# Patient Record
Sex: FEMALE | Race: WHITE | NOT HISPANIC OR LATINO | Employment: UNEMPLOYED | URBAN - METROPOLITAN AREA
[De-identification: names, ages, dates, MRNs, and addresses within clinical notes are randomized per-mention and may not be internally consistent; named-entity substitution may affect disease eponyms.]

---

## 2017-01-02 ENCOUNTER — TELEPHONE (OUTPATIENT)
Dept: PALLIATIVE MEDICINE | Facility: CLINIC | Age: 52
End: 2017-01-02

## 2017-01-02 DIAGNOSIS — M54.16 LUMBAR RADICULOPATHY: ICD-10-CM

## 2017-01-02 DIAGNOSIS — G35 MS (MULTIPLE SCLEROSIS) (H): ICD-10-CM

## 2017-01-02 DIAGNOSIS — M62.838 MUSCLE SPASM: Primary | ICD-10-CM

## 2017-01-02 NOTE — TELEPHONE ENCOUNTER
"Called patient. Patient states that things have been really rough with back and sciatic pain. She also reports an increase in gluteal pain. Reports that  the \"MS has tighten things up\".  She reports further difficulty with ADL's including getting dressed, getting into bed ect.  States that her pain is so high that she is not sleeping and has no appetite. She reports that when she has spasms her pain level is amplified. States she \"has been miserable\".  She reports that she has been wearing  Her TENS unit almost 24/7, says that she may get some relief but is unsufre because her pain level is already so high it is \"hard to know if it is a 7 or 9 lately\".  Also is using ice packs and doing PT stretches as taught, working on hip and hip flexors and stretching when she is laying in bed. She states that she has been \"trying everything she can\" and that her \"whole day spent on trying not to hurt.   Reports that with Methadone increase she has experienced no difference in pain level. Klonopin has also had no impact on pain.  Denied SE's with either of these med changes. She notes that she is to get baclofen pump at end of Feb.   Scheduled for follow up on 01/11/17.  Routing to provider to review and provide any suggestion in the mean time. Patient made aware provider currently out of office    Per 12/10/16 OV:  PROCEDURES:    Opioid management will continue with [facility]      Future appointment/ Follow up :  With Saba Worley CNP  1 months, patient to contact me in one week to see how she is doing    Neurology,  pt to make contact follow up MS  Discussed social work for resources, and home care referral with patient will route request to PCP    Baclofen pump evaluation Erick Ramos    Medications  Increase Methadone 5 - 5- 7.5 mg and on 12/15/16 7.5- 5- 7.5mg   Keep Oxycodone 10 mg without changes for now    Add Klonopin 0.5 mg take 0.25 mg at hs for spasm control, discussed risk associated " with concurrent use of opioids     Other:  Narcan nasal spray rationale, use explained, patient    Methadone use, rationale.  Education    Time spent: 30  minutes including  10 minutes interval history, assessment and medication review/adjustments. 20 minutes for counseling, treatment options, and coordinating care for the above identified medical problems. 10 minutes for medication education on methadone and Narcan     Signed: Saba Worley RN, BC, C.A.N.P.  [facility]    Heidi SANTOS-RN Care Coordinator  [facility]

## 2017-01-02 NOTE — TELEPHONE ENCOUNTER
1/2 @ [facility]    Patient LM wanting to talk with Saba about trying something different. She's been miserable and wants to talk about other options. [phone]    Shabana Flores    [facility]

## 2017-01-03 NOTE — TELEPHONE ENCOUNTER
I reviewed this note and I am needing to clarify what dose of methadone she is on. The last TO she did not increase the Methadone and was taking 5 mg tid.   I am considering a change in her opioids and need to also confirm the amount of Oxycodone she is taking.  Is the Klonopin still at 0.25 mg at hs.  Initially she stated it was helping by 30 %. Is this still true?   Saba Worley, CNP    [facility]

## 2017-01-03 NOTE — TELEPHONE ENCOUNTER
Called patient. Lm to call triage with information on her doses and also good time frame to reach her at to discuss.     Heidi Beltran  BSN-RN Care Coordinator  [facility]              Saba Worley APRN CNP at 12/22/2016  1:26 PM      Status: Signed         Expand All Collapse All    Based on this information I am advising the patient to 1.increase the Methadone to 5-7.5 mg 5 mg today . Then in one week (12/29) increase to 5mg 7.5 mg- 7.5 mg. Her current supply should last almost 3 days. I have approved the RX of Methadone to start 12/25/16.    No change in Klonopin or Oxycodone at this time.    She also need to follow up with her neurologist that manages her MS.    Saba Worley CNP     [facility]

## 2017-01-04 RX ORDER — CLONAZEPAM 0.5 MG/1
0.25 TABLET ORAL 2 TIMES DAILY
Qty: 30 TABLET | Refills: 0 | Status: SHIPPED | OUTPATIENT
Start: 2017-01-04 | End: 2017-01-19

## 2017-01-04 NOTE — TELEPHONE ENCOUNTER
LDM 1/3 at 15:22.    Patient is returning call. She states she is taking 1.5 tabs of methadone TID, 1 tab oxycodone every 6-8 hours, and 1 tab klonopin at bedtime. Call back number: [phone]    Stacy Ying    [facility]

## 2017-01-04 NOTE — TELEPHONE ENCOUNTER
Routing to provider - please see Amanda's reported dosing structure below for Methadone, Oxycodone, and Klonopin.    Haven Coreas, MSN, RN-BC  Care Coordinator  [facility] [place]

## 2017-01-04 NOTE — TELEPHONE ENCOUNTER
I discussed case with Heidi Mueller DO. Contacted patient to inform.  1. Increase Oxycodone 10 mg to 5 doses per day and frequency to 4 hours prn   2. Increase Klonopin to .25 mg bid ( 1/2 tablet(s))  3. Begin taper of Methadone by 2.5 mg weekly.  4. Patient instructed to:  1. Contact her treating neurologist for MS to explore other treatment options.  2. To contact Dr. Griffin in regards to lower dose of Botox for spasm control.  5. I will contact PCP per in-basket message to order Home care with Physical Therapy.  6. Script written for Klonopin 0.25 mg bid  7. Patient  has enough Oxycodone with increase to 5/day,current RX should last until 1/11/17 ( has visit this date.  Please contact patient to inform that supply of current Oxycodone will last until her visit date with me.  Rx for Klonopin has been sent to pharmacy ( located in lock box).  Confirm that increase of Klonopin is 0.25 mg ( 1/2 tablet(s)) bid.     Saba Worley, CNP    [facility]

## 2017-01-04 NOTE — TELEPHONE ENCOUNTER
"Spoke with Amanda - states \"I really don't feel any difference\" on new 7.5mg TID dosing of Methadone. Initially reported up to a 30% improvement but that is no longer the case, no continued improvement. States at times, pain has returned to same level as it was prior to starting Methadone. Days \"I can't move\". Reports most of day living at pain level of 7-8, many other times up to 9-10, but never better than a 7 unless sitting completely still, in a position she manages to find that does not increase pain.    Haven Coreas, MSN, RN-BC  Care Coordinator  [facility]    "

## 2017-01-05 NOTE — TELEPHONE ENCOUNTER
I faxed patients prescription to the [facility] in [place]. I also called and left a message that this was done and that she had enough medication until her next appointment.      Lottie Nielsen [facility] [place]

## 2017-01-13 ENCOUNTER — TELEPHONE (OUTPATIENT)
Dept: PALLIATIVE MEDICINE | Facility: CLINIC | Age: 52
End: 2017-01-13

## 2017-01-13 NOTE — TELEPHONE ENCOUNTER
Verbal discussion with Saba Walls regarding this refill. Patient has cancelled her last 3 appointments, per Saba, she must have follow up appointment next week to receive medications.    I called Amanda and informed her of this - appointment scheduled for 1/19/17. Also let Amanda know she had a new RX for the Klonopin faxed to pharmacy 1/5/17 - she was not aware. Confirmed with her the last RX for Oxycodone was for a start date of 12/23/16 so she should have plenty to get through to her appointment next week.    Haven Coreas, MSN, RN-BC  Care Coordinator  [facility]

## 2017-01-13 NOTE — TELEPHONE ENCOUNTER
Patient called at 10:57. She would like a refill of her oxycodone and also her clonopin. Both of these can be left at the  in [place] for her  to . We can reach her at [phone]    Shannon MCMAHAN (R)

## 2017-01-16 DIAGNOSIS — G35 MS (MULTIPLE SCLEROSIS) (H): ICD-10-CM

## 2017-01-16 DIAGNOSIS — M54.16 LUMBAR RADICULOPATHY: ICD-10-CM

## 2017-01-16 DIAGNOSIS — M62.838 MUSCLE SPASM: Primary | ICD-10-CM

## 2017-01-16 DIAGNOSIS — G89.4 CHRONIC PAIN SYNDROME: ICD-10-CM

## 2017-01-16 NOTE — TELEPHONE ENCOUNTER
Reason for call: Medication Question    Will the patient be using a [facility]? No  Name of the pharmacy and phone number for the current request: [id number] - [place], [place] - [address]    Name of the medication requested: Oxycodone (Roxicodone) 10 mg IR tablet    Fernando (patient's ) requests to speak with medical staff. States patient has been without pain med and is hurting. Patient and spouse are wondering why the medication wasn't prescribed. Requests to be called back ASAP.    Phone Number Fernando (pt's ) can be reached at: Cell number on file:    Telephone Information:   Mobile [phone]     Best Time: ASAP  Can we leave a detailed message on this number? YES    Kim Maunabo    [facility]

## 2017-01-17 ENCOUNTER — TELEPHONE (OUTPATIENT)
Dept: PALLIATIVE MEDICINE | Facility: CLINIC | Age: 52
End: 2017-01-17

## 2017-01-17 RX ORDER — OXYCODONE HYDROCHLORIDE 10 MG/1
TABLET ORAL
Qty: 12 TABLET | Refills: 0 | Status: SHIPPED | OUTPATIENT
Start: 2017-01-17 | End: 2017-01-19

## 2017-01-17 NOTE — TELEPHONE ENCOUNTER
Called patient. She states that her script was only for #100 Oxycodone, this would not equate to 4/day max for 30days. Advised that script was written as 3-4/day and thus 100 is the mid point of quantity needed, this is what caused confusion in thinking she had enough to last until appointment. She states that she consistently uses 4/day max not 3. She reports that it is possible that she may have used more than 4/day somedays by mistake but generally only takes 4/day.     Confirmed Oxycodone- usage is 4 per day, is completely out. Did not take anything this morning. Last taken 3 pm.   Confirmed Klonopin- Taking 1/2 tablet BID, but was told by on call to take 1/2 tab q4h, she started this last evening. Has approx. #24 left.     Advised that Provider likely will provide script for her Oxycodone with enough quantity to get her to her appointment which is 01/19/17 at 11:00. She reports that her spouse Fernando would be available to  script today.  Advised that our office would contact her for script .     Oxycodone 10mg tablets, #12, Refill:NO  Take 1 to 2 tablet(s) every 4 hours as needed for pain limit 3-4  tablet(s) per day.  This is a 3 day supply.   Ok to dispense on/after 01/17/17 and Start 01/17/17      Pt last seen on 12/09/16  Next appt scheduled for 01/19/17    Last urine drug screen 06/09/2016  Current opioid agreement on file Yes Date: 06/09/16    Processing (pick one):       Pt will  in clinic at  location- [facility] will . Call when ready.       Will facilitate refill.            .

## 2017-01-17 NOTE — TELEPHONE ENCOUNTER
Medication approved. Pleas leave call open for provider  Saba Worley CNP    [facility]

## 2017-01-17 NOTE — TELEPHONE ENCOUNTER
Copied documentation from another TE:    Call Documentation      Saba Worley APRN CNP at 1/17/2017  1:49 PM      Status: Signed         Expand All Collapse All    Reviewed note, appreciate cross coverage. I did authorize the Oxycodone RX ( on the call noted) I have also left instructions for the nurse informthe patient stay on increase Klonopin 0.25 mg every 4 hrs as needed with limit 4 per day. Please see the telephone encounter date 1/16/17. If this is not effective she should contact our office.     Saba Worley CNP     [facility]                      Heidi Mueller DO at 1/17/2017  9:08 AM      Status: Signed         Expand All Collapse All    Patient's  (Fernando) called on call last night. He said his wife was out of pain medication and was expected to be out last week. He said they had called and left messages on Friday and Monday. I do see a telephone encounter from her usual pain provider BAYLEE Powers CNP stating that her oxycodone Rx should last until 1/11/17. She took her last methadone 5 mg in the morning and her last dose of oxycodone was on Sunday. When asked to speak with the patient, her  said she could not come to the phone because of the intensity of her pain. He said he was looking for some answers for pain control until they are able to get the baclofen pump trial, which he says is not scheduled until next month. She has been taking clonazepam 0.5 mg 1/2 tab twice a day. I said it was ok to take 1/2 tab every 4-6 hours as needed until able to  the opioid script in the morning.     Heidi Mueller DO  [facility]

## 2017-01-17 NOTE — TELEPHONE ENCOUNTER
Per ARTHUR Beltran, patient has already been informed of the message below.    Haven Coreas, MSN, RN-BC  Care Coordinator  [facility] [place]

## 2017-01-17 NOTE — TELEPHONE ENCOUNTER
Fernando (spouse) calling, he would like to know if the script is ready to be picked up. He states he was told that it would be ready between 9-10 am, I advised him that the note states he should get a call when this is ready to be picked up. Please call      Carmen OSBORNE    [facility]

## 2017-01-17 NOTE — TELEPHONE ENCOUNTER
Patient's  (Fernando) called on call last night. He said his wife was out of pain medication and was expected to be out last week. He said they had called and left messages on Friday and Monday. I do see a telephone encounter from her usual pain provider BAYLEE Powers CNP stating that her oxycodone Rx should last until 1/11/17. She took her last methadone 5 mg in the morning and her last dose of oxycodone was on Sunday. When asked to speak with the patient, her  said she could not come to the phone because of the intensity of her pain. He said he was looking for some answers for pain control until they are able to get the baclofen pump trial, which he says is not scheduled until next month. She has been taking clonazepam 0.5 mg 1/2 tab twice a day. I said it was ok to take 1/2 tab every 4-6 hours as needed until able to  the opioid script in the morning.     Heidi Mueller DO  [facility]

## 2017-01-17 NOTE — TELEPHONE ENCOUNTER
Rx placed at  [facility]. Left patient message. , Fernando, authorized to     Haven Washburn, [facility]

## 2017-01-17 NOTE — TELEPHONE ENCOUNTER
Chart reviewed. Note: I did not state that script would be ready at any specified time but stated that we would route for review and contact when ready.

## 2017-01-17 NOTE — TELEPHONE ENCOUNTER
Reviewed note, appreciate cross coverage. I did authorize the Oxycodone RX ( on the call noted) I have also left instructions for the nurse informthe patient stay on increase Klonopin 0.25 mg every 4 hrs as needed with limit 4 per day. Please see the telephone encounter date 1/16/17. If this is not effective she should contact our office.    Saba Worley, CNP    [facility]

## 2017-01-19 ENCOUNTER — OFFICE VISIT (OUTPATIENT)
Dept: PALLIATIVE MEDICINE | Facility: CLINIC | Age: 52
End: 2017-01-19
Payer: COMMERCIAL

## 2017-01-19 ENCOUNTER — TELEPHONE (OUTPATIENT)
Dept: PALLIATIVE MEDICINE | Facility: CLINIC | Age: 52
End: 2017-01-19

## 2017-01-19 VITALS
BODY MASS INDEX: 35.43 KG/M2 | SYSTOLIC BLOOD PRESSURE: 132 MMHG | WEIGHT: 240 LBS | DIASTOLIC BLOOD PRESSURE: 79 MMHG | HEART RATE: 88 BPM | OXYGEN SATURATION: 91 %

## 2017-01-19 DIAGNOSIS — G89.4 CHRONIC PAIN SYNDROME: Primary | ICD-10-CM

## 2017-01-19 DIAGNOSIS — G35 MS (MULTIPLE SCLEROSIS) (H): ICD-10-CM

## 2017-01-19 DIAGNOSIS — M54.16 LUMBAR RADICULOPATHY: ICD-10-CM

## 2017-01-19 DIAGNOSIS — M62.838 MUSCLE SPASM: ICD-10-CM

## 2017-01-19 PROCEDURE — 99214 OFFICE O/P EST MOD 30 MIN: CPT | Performed by: NURSE PRACTITIONER

## 2017-01-19 RX ORDER — CLONAZEPAM 0.5 MG/1
0.25 TABLET ORAL
Qty: 90 TABLET | Refills: 0 | Status: ON HOLD | OUTPATIENT
Start: 2017-01-19 | End: 2017-01-21

## 2017-01-19 RX ORDER — OXYCODONE HYDROCHLORIDE 10 MG/1
TABLET ORAL
Qty: 210 TABLET | Refills: 0 | Status: SHIPPED | OUTPATIENT
Start: 2017-01-19 | End: 2017-02-16

## 2017-01-19 RX ORDER — OXYCODONE HYDROCHLORIDE 10 MG/1
TABLET ORAL
Qty: 210 TABLET | Refills: 0 | Status: SHIPPED | OUTPATIENT
Start: 2017-01-19 | End: 2017-01-19

## 2017-01-19 ASSESSMENT — PAIN SCALES - GENERAL: PAINLEVEL: EXTREME PAIN (8)

## 2017-01-19 NOTE — PROGRESS NOTES
"Chief Complaint   Patient presents with     Pain     follow up       Initial LMP 12/11/2011 Estimated body mass index is 36.61 kg/(m^2) as calculated from the following:    Height as of 10/30/16: 1.753 m (5' 9\").    Weight as of 10/30/16: 112.492 kg (248 lb).  BP completed using cuff size: sapphire Nielsen [facility]        "

## 2017-01-19 NOTE — PROGRESS NOTES
"  Interval history :This a follow up examination  for Amanda Richardson who is an 49 year old female, who is  being seen in follow up for chronic pain.   Primary Care Provider:Julius Hassan MD.   in visit today  Last visit 11/02/16  Today's date: 01/19/2017      Chief complaints    Leg spasms are worse, baclofen pump trial 2/28 \" if baclofen not effective can we just go to the Morphine pump\" (spouse stated)    short acting Oxycodone 10-20  mg not effective would like higher dose taking 4-5 tablet(s) per day now Klonopin help a little, nothing helps more than 3 hrs.     Back with right leg pain, spasm of Gluteal muscle     Had Botox in 12/4 no help at [facility] [address]  No longer receiving, now right leg vangie and his not weight bearing      Opioid analgesia high, see no effect with Methadone stopped         Risk Son with CD history and is doing well with Sobriety.  He is not working      medical marijuana not effective cost was high, not using, concerned with THC entry in chart. Patient showed me bottle from dispensary and noted THC on bottle.        Current Pain:   Description: sharp, tender,shooting, spasms off and on all day long   Location: low back, right hip right gluteal, right leg,   Pain level: 8/10,  Average 8/10, range 8-10/10  Quality  Worse at evening-night, continuous, exhausting, miserable, nagging, unbearable     Aggravating factors: inactivity, over doing   Relieving factors  Rest, medications, ice, stretching, Botox( 08/2015),TENS, meds,ice lidoderm   Quality of life: mood depressed,.  Denies suicidal thoughts,but frustrated.   Activity level not much walking any more, struggles with self care. Sleep still poor, sleeps periods of 2 hours at a time waking with painful spasms with a total 4-6 hours per night or less, naps during the day.     Progress in Program:   Counseling prn, Physical Therapy: complete,  Self care daily TENS, theracane, ice,  exercise yes,walking relaxation yes, body awareness " no.    PAST MEDICATION TRIALS:  Hydrocodone,Fentanyl, OxyContin, Lea, Tramadol, Ibuprofen, Naproxen, Flexeril, Tizanidine, Cymbalta, Lyrica, Fentanyl. 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     Fusion lumbar anterior, fusion lumbar posterior two levels, combined  10/17/2013     lumbar fusion - Dr Floyd     Surgical history of -   3/14     Left L4-5 Epidural Dr Winter     Colonoscopy N/A 1/7/2015     tubular adenomas x 2 - due 5 yrs     Surgical history of -   5/15     Right Bimalleolar ankle fx ORIF     Current Medications:   Current Outpatient Prescriptions   Medication     oxyCODONE (ROXICODONE) 10 MG IR tablet     clonazePAM (KLONOPIN) 0.5 MG tablet     naloxone (NARCAN) nasal spray     DULoxetine (CYMBALTA) 30 MG capsule     amitriptyline (ELAVIL) 100 MG tablet     losartan-hydrochlorothiazide (HYZAAR) 100-25 MG  per tablet     metoprolol (TOPROL-XL) 50 MG 24 hr tablet     gabapentin (NEURONTIN) 300 MG capsule     lidocaine (LIDODERM) 5 % patch     glatiramer (COPAXONE) 20 MG/ML injection     Cholecalciferol (VITAMIN D PO)     Multiple Vitamin (MULTI-VITAMIN PO)     omega-3 fatty acids (FISH OIL) 1200 MG capsule     aspirin 81 MG tablet     [DISCONTINUED] clonazePAM (KLONOPIN) 0.5 MG tablet     No current facility-administered medications for this visit.     ROS: twelve systems review negative except for: fatigue, joint pain,insomnia, urinary urgency, weakness,stiffness, spasm, stiffness. numbness/tingling, and depression     Physical Exam  Constitutional:Blood pressure 132/79, pulse 88, weight 108.863 kg (240 lb), last menstrual period 12/11/2011, SpO2 91 %, not currently breastfeeding.  Psyche:  Fully oriented, Behavioral Observations: Eye contact good. Mood and spirits are  Depressed, tearful    Working status no   Musculoskeletal exam: standing with support of exam table but mainly in w/c today. Less  frequent spasms of right leg noted today at visit  Neuro exam:   Alert,  RONI @ 3 mm, Speech clear fluent and appropriate.  TURCIOS x 4, noting frequent spasms ( painful spasms) RLE  Skin/Vascular/ Autonomic:  warm, dry and intact    Opioid risk:   DIRE Score for ongoing opioid management is calculated as follows:    Diagnosis = 3    Intractability = 2    Risk: Psych = 2  Chem Hlth = 2  Reliability = 2  Social = 2    Efficacy = 2    Total DIRE Score = 15 (14 or higher predicts good candidate for ongoing opioid management; 13 or lower predicts poor candidate for opioid management)   Daniel Corado 2006,The Journal of  Pain.,The DIRE Score: Predicting Outcomes of Opioid Prescribing for Chronic Pain Vol.7,No.9, pp 841-804.  MNPMP:  reviewed as expected without evidence of abuse, misuse or diversion.  Opioid tolerance moderate to high Morphine EQ 75 mg /day ( in the setting of benzodiazapine use at 30 mg Valium per  day)  Compliance: UDS 06/09/16 expected results, opioid agreement 06/09/16  Analgesia  very poor  Activity:little to none, Adverse events: none  Adherence: fair     Assessment:  1 Lumbar radiculopathy - right hip right leg  and low back  right hip bursitis piriformis syndrome in past    A. Chronic low back pain with bilateral radicular feature L2-3 on right and L3-4 on left. Reports increase low back radicular right leg pain.  Frequent spasms contributing     - Flexion based pain s/p interventional procedure left L2-3 epidural  controlled     - unlikely there is epidural access problematic due to extent of fusion                -History of lumbar multi level lumbar a/p fusion L4-S1 with pelvic fixation Oct 2013 (procedure below)     Anterior spinal fusion, L4-L5. Insertion of intervertebral biomechanical device, L4-L5 (PEEK device). Anterior lumbar instrumentation, L4-L5, with screw fixation and anterior cover plate. [place] of local autograft bone fro5%m bony endplates.Posterior lumbar fusion, L4-L5 (bilateral intertransverse technique) Posterolateral fusion, L5-S1 (bilateral intertransverse to sacral ala technique). Bilateral decompression, L4-L5.L5-S1. Segmental pedicle screw instrumentation, L4-S1. Bilateral pelvic screw instrumentation with connection to [id number] screws.    Plan:   Continue Cymbalta 90 mg daily,continue Gabapentin 300 mg qid, Amitriptyline 100 mg,Lidoderm patches. Theracare, ice packs. Consider JEFFREY if possible at L5-S1 or caudal JEFFREY.  May need to redo MRI   2.  Multiple sclerosis-  Spasms ? Relationship to opioids/ Gabapentin  and metabolic Myoclonus    -  Follow up with Dr. Vigil Immobility and isolation problematic. Painful spasms that can keep her wake all night long and now prolong during day.      Plan:  Severe Spasticity noted right leg and right gluteal area. Continue Oxycodone, baclofen  Pump, Klonopin 0.25 mg (6) doses per day Valium EQ is 30 mg per day  No further dose increases over  the phone or in clinic.      Patient has Resource given in past for national MS society at last visit  www.nationalmssociety.org/Resources- consider  service and see if MS resources can be available  for this          3. Gait and balance disorder  fall with subsequent injury post ORIF X2 right ankle   Monitor medication effects to reduce falls risk     Plan:    4wheeled walker and cane. Discussed safety has handicap hang tag, now essentially w/chair bound      4. Mood disorder: depression symptoms ( isolation , increase pain and spasms, relationship stress)    Plan : cymbalta 90 mg     Mental health referral placed in past     [place] www.al-[email or web address] if needed    5.   Insomnia Non restorative sleep with frequent awakening due to pain and non pain sources, self report of snoring leg jerks onset 11 pm    Plan:  Amitriptyline 100 mg     6. Encounter for continuous use of opioids:    24 opioid total per day 50  mg per day Oxycodone , Morphine  EQ 75 mg per day   Concerns with over use  Self medicates   Narcan for home use        .       PROCEDURES:  None   Opioid management will continue with [facility]     Future appointment/ Follow up :  With Saba Worley CNP  1 months, patient to contact me   Neurology follow up with Dr. Vigil Scheduled  Discussed social work for resources, and home care referral with patient has PCP appointment upcoming    Baclofen pump trial 2/28/17 Dr. Ramos    Medications  Oxycodone 10 mg Increase to 1-2 tablet(s) every 4 hours limit 7 per day ( patient states at times taking 5 tablet(s) per day. No further adjustments in clinic advised in patient management for pain control  Add Klonopin 0.5 mg take 0.25 mg every 4 hrs prn  for spasm control, limit 6 per day discussed risk associated with concurrent use of opioids     Other:  Narcan nasal spray rationale, use explained, patient     Time spent: 30  minutes including  10 minutes interval history, assessment and  medication review/adjustments. 10 minutes for counseling, treatment options, and coordinating care for the above identified medical problems. 10 minutes for medication education Narcan and unintended AE's with opioids and Benzodiazepines     Signed: Saba Worley RN, BC, C.A.N.P.  [facility]

## 2017-01-19 NOTE — TELEPHONE ENCOUNTER
Pharmacy calling - the last RX was listed as a 30-day supply for 12/23/16 so insurance will not release RX given at appointment today until 1/20/17. Reviewed med notes on last RX which states on 1/4/17 Saba Walls authorized patient to increase up to 5/day, that is why she's out.     Pharmacist Samy is going to call Amanda's insurance with above information and request an override. I called Amanda to update her on the talk with pharmacy. I asked her about the extra 3-day prescription she was given on 1/17/17, if she had gotten that filled. She said yes, at a different pharmacy, [facility] she thought. She stated she has 4-6 of these left, so is not out in case override requested by pharmacy does not go through.    Haven Coreas, MSN, RN-BC  Care Coordinator  [facility]

## 2017-01-19 NOTE — PATIENT INSTRUCTIONS
Increase Oxycodone 10 mg to 1-2 tablet(s) every 4 hours limit 7 per day   Increase interval dosing of Klonopin 0.5 mg to 1/2 tablet(s) to every 3 hours as needed.  Limit  6 1/2 tablet(s) per day.     Anticipate baclofen pump trial  Advised to talk with  to see if an appointment cn be coordinated with a provider that does morphine pump.  Maybe the same cannula can be used for this trial if the baclofen trial is not effective.     Contact me if the increase dosing does not work.  Return visit in 3-4 weeks.     Saba Worley CNP    [facility]       Nurse Triage line:  [phone]   Call this number with any questions or concerns. You may leave a detailed message anytime. Calls are typically returned Monday through Friday between 8 AM and 4:30 PM. We usually get back to you within 2 business days depending on the issue/request.       Medication refills:    For non-narcotic medications, call your pharmacy directly to request a refill. The pharmacy will contact the Pain Management Center for authorization. Please allow 3-4 days for these refills to be processed.     For narcotic refills, call the nurse triage line or send a [employer] message. Please contact us 7-10 days before your refill is due. The message MUST include the name of the specific medication(s) requested and how you would like to receive the prescription(s). The options are as follows:    Pain Clinic staff can mail the prescription to your pharmacy. Please tell us the name of the pharmacy.    You may pick the prescription up at the Pain Clinic (tell us the location) or during a clinic visit with your pain provider    Pain Clinic staff can deliver the prescription to the [facility] pharmacy in the clinic building. Please tell us the location.      Scheduling number: [phone].  Call this number to schedule or change appointments.    We believe regular attendance is key to your success in our program.    Any time you are  unable to keep your appointment we ask that you call us at least 24 hours in advance to let us know. This will allow us to offer the appointment time to another patient.

## 2017-01-19 NOTE — MR AVS SNAPSHOT
After Visit Summary   1/19/2017    Amanda Richardson    MRN: [id number]           Patient Information     Date Of Birth          1965        Visit Information        Provider Department      1/19/2017 11:00 AM Saba Worley APRN CNP [facility]        Today's Diagnoses     Chronic pain syndrome    -  1     MS (multiple sclerosis)         Muscle spasm         MS (multiple sclerosis) (H)         Lumbar radiculopathy           Care Instructions    Increase Oxycodone 10 mg to 1-2 tablet(s) every 4 hours limit 7 per day   Increase interval dosing of Klonopin 0.5 mg to 1/2 tablet(s) to every 3 hours as needed.  Limit  6 1/2 tablet(s) per day.     Anticipate baclofen pump trial  Advised to talk with  to see if an appointment cn be coordinated with a provider that does morphine pump.  Maybe the same cannula can be used for this trial if the baclofen trial is not effective.     Contact me if the increase dosing does not work.  Return visit in 3-4 weeks.     Saba Worley CNP    [facility]       Nurse Triage line:  [phone]   Call this number with any questions or concerns. You may leave a detailed message anytime. Calls are typically returned Monday through Friday between 8 AM and 4:30 PM. We usually get back to you within 2 business days depending on the issue/request.       Medication refills:    For non-narcotic medications, call your pharmacy directly to request a refill. The pharmacy will contact the [facility] Center for authorization. Please allow 3-4 days for these refills to be processed.     For narcotic refills, call the nurse triage line or send a TSB message. Please contact us 7-10 days before your refill is due. The message MUST include the name of the specific medication(s) requested and how you would like to receive the prescription(s). The options are as follows:    Pain Clinic staff can mail the  prescription to your pharmacy. Please tell us the name of the pharmacy.    You may pick the prescription up at the Pain Clinic (tell us the location) or during a clinic visit with your pain provider    Pain Clinic staff can deliver the prescription to the [facility] pharmacy in the clinic building. Please tell us the location.      Scheduling number: [phone].  Call this number to schedule or change appointments.    We believe regular attendance is key to your success in our program.    Any time you are unable to keep your appointment we ask that you call us at least 24 hours in advance to let us know. This will allow us to offer the appointment time to another patient.             Follow-ups after your visit        Who to contact     If you have questions or need follow up information about today's clinic visit or your schedule please contact [facility] directly at [phone].  Normal or non-critical lab and imaging results will be communicated to you by [employer], letter or phone within 4 business days after the clinic has received the results. If you do not hear from us within 7 days, please contact the clinic through [employer] or phone. If you have a critical or abnormal lab result, we will notify you by phone as soon as possible.  Submit refill requests through [employer] or call your pharmacy and they will forward the refill request to us. Please allow 3 business days for your refill to be completed.          Additional Information About Your Visit        [employer] Information     [employer] gives you secure access to your electronic health record. If you see a primary care provider, you can also send messages to your care team and make appointments. If you have questions, please call your primary care clinic.  If you do not have a primary care provider, please call [phone] and they will assist you.        Care EveryWhere ID     This is your Care EveryWhere ID. This could be used by other  organizations to access your [facility] medical records  [id number]        Your Vitals Were     Pulse Pulse Oximetry Last Period             88 91% 12/11/2011          Blood Pressure from Last 3 Encounters:   01/19/17 132/79   12/09/16 128/60   11/02/16 130/70    Weight from Last 3 Encounters:   01/19/17 108.863 kg (240 lb)   10/30/16 112.492 kg (248 lb)   05/02/16 112.492 kg (248 lb)              Today, you had the following     No orders found for display         Today's Medication Changes          These changes are accurate as of: 1/19/17 11:48 AM.  If you have any questions, ask your nurse or doctor.               These medicines have changed or have updated prescriptions.        Dose/Directions    clonazePAM 0.5 MG tablet   Commonly known as:  klonoPIN   This may have changed:    - when to take this  - reasons to take this   Used for:  Muscle spasm, MS (multiple sclerosis) (H), Lumbar radiculopathy        Dose:  0.25 mg   Take 0.5 tablets (0.25 mg) by mouth every 3 hours as needed for anxiety (limit 6 1/2 tabs per day)   Quantity:  90 tablet   Refills:  0       oxyCODONE 10 MG IR tablet   Commonly known as:  ROXICODONE   This may have changed:  additional instructions   Used for:  Chronic pain syndrome, MS (multiple sclerosis) (H)        Take 1 to 2 tablet(s) every 4 hours as needed for pain limit 7  tablet(s) per day.   Quantity:  210 tablet   Refills:  0            Where to get your medicines      Some of these will need a paper prescription and others can be bought over the counter.  Ask your nurse if you have questions.     Bring a paper prescription for each of these medications    - clonazePAM 0.5 MG tablet  - oxyCODONE 10 MG IR tablet             Primary Care Provider Office Phone # Fax #    Julius Hassan [phone]       [address] [address]        Goals        Patient-Stated    I will continue to work with home care until discharge goals  are met.     Goal Comments - Note edited  10/28/2015  3:09 PM by Cordelia Moreland RN    As of today's date 9/22/2015 goal is met at 76 - 100%.   Goal Status:  Ongoing  As of today's date 10/28/2015 goal is met at 76 - 100%.   Goal Status:  Complete            Thank you!     Thank you for choosing [facility]  for your care. Our goal is always to provide you with excellent care. Hearing back from our patients is one way we can continue to improve our services. Please take a few minutes to complete the written survey that you may receive in the mail after your visit with us. Thank you!             Your Updated Medication List - Protect others around you: Learn how to safely use, store and throw away your medicines at www.disposemymeds.org.          This list is accurate as of: 1/19/17 11:48 AM.  Always use your most recent med list.                   Brand Name Dispense Instructions for use    amitriptyline 100 MG tablet    ELAVIL    90 tablet    Take 1 tablet (100 mg) by mouth At Bedtime       aspirin 81 MG tablet     100 tablet    Take 1 tablet by mouth daily.       clonazePAM 0.5 MG tablet    klonoPIN    90 tablet    Take 0.5 tablets (0.25 mg) by mouth every 3 hours as needed for anxiety (limit 6 1/2 tabs per day)       DULoxetine 30 MG EC capsule    CYMBALTA    270 capsule    30 mg in am and 60 mg in pm       gabapentin 300 MG capsule    NEURONTIN    120 capsule    Take 1 capsule (300 mg) by mouth 4 times daily       glatiramer 20 MG/ML injection    COPAXONE    30 each    Inject 1 mL (20 mg) Subcutaneous daily       lidocaine 5 % Patch    LIDODERM    30 patch    Apply 1  patches to low back and right buttock ( cut to fit)  area at once for up to 12 h within a 24 h period.  Remove after 12 hours.       losartan-hydrochlorothiazide 100-25 MG per tablet    HYZAAR    90 tablet    Take 1 tablet by mouth daily       metoprolol 50 MG 24 hr tablet    TOPROL-XL    90 tablet    Take 1 tablet (50 mg) by  mouth daily       MULTI-VITAMIN PO      Take 1 tablet by mouth daily       naloxone nasal spray    NARCAN    0.2 mL    Spray 1 spray (4 mg) into one nostril alternating nostrils as needed for opioid reversal One spray into nares prn for opioid reversal (use every 2-3 minutes until medical help arrives)       omega-3 fatty acids 1200 MG capsule      Take 1 capsule by mouth daily.       oxyCODONE 10 MG IR tablet    ROXICODONE    210 tablet    Take 1 to 2 tablet(s) every 4 hours as needed for pain limit 7  tablet(s) per day.       VITAMIN D PO      Take 1 tablet by mouth daily 1000 IU daily

## 2017-01-19 NOTE — TELEPHONE ENCOUNTER
Pharmacist calling at 2:06 pm today to say that The Oxycodone dose has doubled and won't go through Pt's insurance. He would like  A call at  [phone].       Fernando calling to say the same thing.  Shellie cates rn

## 2017-01-19 NOTE — TELEPHONE ENCOUNTER
The patient's spouse tried to fill this RX at [facility] in [place]  [phone].   Patient did not fill the Rx date 1/17 start date until yesterday 1/18/17 and this was for 12 tabs.  Her opioid prescriptions are being filled at [facility] and [facility]  She is not filling them in a timely manner and using more than one pharmacies. These are concerning issues contributes to poor pain control and has significant safety risk.   I spoke with the lead pharmacist at [facility].   Patient has supply at home to get her through the night, Pharmacist informed, no over ride needed, patient is not to pay cash for Oxycodone.   She will need to wait until it can be filled by on 1/20/17.  Patient called and informed by this writer.  Left message on cell phone to contact our office for update.   I have written for the last Oxycodone 10 mg at a limit for  Limit of 7 per day.  Please advise ( was told in office) that this is a significant increase. She is taking 5 per day per her report. So for today and tomorrow limit to 6 per day and then if tolerating can increase to 7 per day.  She did not make a follow up appointment with me. I will not authorize any new refills of opioids or any increases over the phone. She MUST be seen by this writer.  Her pain is so difficult to control and there is strong concern for unintended respiratory  suppression.  If further adjustment are needed, the patient may need to be admitted for pain control.   Routing to on call pain provider and discussed with him by phone.  Saba Worley, CNP    [facility]

## 2017-01-20 ENCOUNTER — HOSPITAL ENCOUNTER (INPATIENT)
Facility: CLINIC | Age: 52
LOS: 3 days | Discharge: HOME | DRG: 059 | End: 2017-01-25
Attending: NURSE PRACTITIONER | Admitting: INTERNAL MEDICINE
Payer: MEDICARE

## 2017-01-20 ENCOUNTER — TELEPHONE (OUTPATIENT)
Dept: NURSING | Facility: CLINIC | Age: 52
End: 2017-01-20

## 2017-01-20 DIAGNOSIS — M62.838 MUSCLE SPASMS OF BOTH LOWER EXTREMITIES: Primary | ICD-10-CM

## 2017-01-20 DIAGNOSIS — M79.605 CHRONIC PAIN OF BOTH LOWER EXTREMITIES: ICD-10-CM

## 2017-01-20 DIAGNOSIS — M79.604 CHRONIC PAIN OF BOTH LOWER EXTREMITIES: ICD-10-CM

## 2017-01-20 DIAGNOSIS — G89.29 CHRONIC PAIN OF BOTH LOWER EXTREMITIES: ICD-10-CM

## 2017-01-20 PROCEDURE — 99285 EMERGENCY DEPT VISIT HI MDM: CPT

## 2017-01-20 RX ORDER — DIAZEPAM 5 MG
5 TABLET ORAL ONCE
Status: COMPLETED | OUTPATIENT
Start: 2017-01-20 | End: 2017-01-21

## 2017-01-20 RX ORDER — OXYCODONE HYDROCHLORIDE 5 MG/1
10 TABLET ORAL ONCE
Status: COMPLETED | OUTPATIENT
Start: 2017-01-20 | End: 2017-01-21

## 2017-01-20 ASSESSMENT — ENCOUNTER SYMPTOMS
BACK PAIN: 1
NUMBNESS: 0
ARTHRALGIAS: 1

## 2017-01-20 NOTE — IP AVS SNAPSHOT
MRN:[id number]                      After Visit Summary   1/20/2017    Amanda Richardson    MRN: [id number]           Thank you!     Thank you for choosing [facility] for your care. Our goal is always to provide you with excellent care. Hearing back from our patients is one way we can continue to improve our services. Please take a few minutes to complete the written survey that you may receive in the mail after you visit. If you would like to speak to someone directly about your visit please contact Patient Relations at [phone]. Thank you!          Patient Information     Date Of Birth          1965        About your hospital stay     You were admitted on:  January 21, 2017 You last received care in the:  [address] [facility]    You were discharged on:  January 25, 2017        Reason for your hospital stay       Muscle spasm  UTI                  Who to Call     For medical emergencies, please call 911.  For non-urgent questions about your medical care, please call your primary care provider or clinic, [phone]          Attending Provider     Provider    Dewayne Mcdaniel, Rg Sinclair CNP, Miguel Ángel Alvarez MD       Primary Care Provider Office Phone # Fax #    Julius Hassan [phone]       [place] [address]        After Care Instructions     Diet       Follow this diet upon discharge: Regular                  Follow-up Appointments     Follow-up and recommended labs and tests        Follow up with the pain specialist in 1-2 weeks for hospital follow- up.    Recommend having a repeat urinalysis as outpatient once UTI is adequately treated, this can be done as outpatient in 2 weeks with your Primary care doctor. If you continue to have blood cells in urine,  then discuss with PCP and consider referral to urology                  Further instructions from your care  team       Opioid Medication Information    You have been given a prescription for an opioid (narcotic) pain medicine and/or have received a pain medicine. These medicines can make you drowsy or impaired. You must not drive, operate dangerous equipment, or engage in any other dangerous activities while taking these medications. If you drive while taking these medications, you could be arrested for DUI, or driving under the influence. Do not drink any alcohol while you are taking these medications.   Opioid pain medications can cause addiction. If you have a history of chemical dependency of any type, you are at a higher risk of becoming addicted to pain medications.  Only take these prescribed medications to treat your pain when all other options have been tried. Take it for as short a time and as few doses as possible. Store your pain pills in a secure place, as they are frequently stolen and provide a dangerous opportunity for children or visitors in your house to start abusing these powerful medications. We will not replace any lost or stolen medicine.  As soon as your pain is better, you should seek out a drug take back program (see your local police department) to dispose of them.   Over-the-counter medications and prescription drugs can pollute mosley and be harmful to humans, fish, and other wildlife when disposed of improperly -- do not flush medications down the toilet or place in the trash.  Properly disposing of medicines is important to prevent abuse or poisoning and protect the environment.     Prescription and over-the-counter medications are collected anonymously from residents for free at [facility] drop-off locations. Visit the [facility]'s Office Prescription Drug Drop-Off page for the list of drop-off sites and information about the program.       [place]  Police Department [phone] Mon-Fri  8am to 4:30pm     [place]  Police Department [phone] 24hrs a  "day    [place]  Police Department [phone] Mon-Fri  8am to 6:00pm     [place]  Police Department [phone] Mon-Fri  8am to 4:30pm     [place]  Police Department [phone] 24hrs a day      [place]  Police Department [phone] Mon-Fri  8am to 4:30pm   Many prescription pain medications contain Tylenol  (acetaminophen), including Vicodin , Tylenol #3 , Norco , Lortab , and Percocet .  You should not take any extra pills of Tylenol  if you are using these prescription medications or you can get very sick.  Do not ever take more than 3000 mg of acetaminophen in any 24 hour period.  All opioids tend to cause constipation. Drink plenty of water and eat foods that have a lot of fiber, such as fruits, vegetables, prune juice, apple juice and high fiber cereal.  Take a laxative if you don t move your bowels at least every other day. Miralax , Milk of Magnesia, Colace , or Senna  can be used to keep you regular.  You will likely need to continue stool softeners and stimulants while taking opioids.     Stop taking Clonazepam. Destroy filled prescription at home.    Maximum dose of oxycodone 10mg tabs is 5 per day.         Pending Results     No orders found from 1/19/2017 to 1/21/2017.            Statement of Approval     Ordered          01/25/17 1338  I have reviewed and agree with all the recommendations and orders detailed in this document.   EFFECTIVE NOW     Approved and electronically signed by:  Lyric Brandon MD             Admission Information        Provider Department Dept Phone    1/20/2017 Miguel Ángel Banegas MD, MD  [facility] [phone]      Your Vitals Were     Blood Pressure Pulse Temperature Respirations    120/42 mmHg 83 97.8  F (36.6  C) (Oral) 16    Height Weight BMI (Body Mass Index) Pulse Oximetry    1.753 m (5' 9\") 110.224 kg (243 lb) 35.87 kg/m2 93%    Last Period             12/11/2011         MyChart Information     [facility] gives you secure " access to your electronic health record. If you see a primary care provider, you can also send messages to your care team and make appointments. If you have questions, please call your primary care clinic.  If you do not have a primary care provider, please call [phone] and they will assist you.        Care EveryWhere ID     This is your Care EveryWhere ID. This could be used by other organizations to access your [facility] medical records  [id number]           Review of your medicines      START taking        Dose / Directions    ATIVAN 0.5 MG tablet   Used for:  Muscle spasms of both lower extremities   Generic drug:  LORazepam        Three times per day. Take one dose at bedtime. May have 2 other doses every 6 hours if needed for muscle spasms. Max of 3 tabs per day. Do not take if sleepy.   Quantity:  42 tablet   Refills:  0       baclofen 10 MG tablet   Commonly known as:  LIORESAL   Used for:  Chronic pain of both lower extremities        Dose:  10 mg   Take 1 tablet (10 mg) by mouth 3 times daily   Quantity:  90 tablet   Refills:  0       ciprofloxacin 250 MG tablet   Commonly known as:  CIPRO   Indication:  Urinary Tract Infection   Used for:  Chronic pain of both lower extremities        Dose:  250 mg   Take 1 tablet (250 mg) by mouth 2 times daily for 7 doses   Quantity:  7 tablet   Refills:  0         CONTINUE these medicines which may have CHANGED, or have new prescriptions. If we are uncertain of the size of tablets/capsules you have at home, strength may be listed as something that might have changed.        Dose / Directions    gabapentin 600 MG tablet   Commonly known as:  NEURONTIN   This may have changed:    - medication strength  - how much to take  - when to take this   Used for:  Chronic pain of both lower extremities        Dose:  600 mg   Take 1 tablet (600 mg) by mouth 3 times daily   Quantity:  90 tablet   Refills:  0         CONTINUE these medicines which have NOT CHANGED        Dose /  Directions    amitriptyline 100 MG tablet   Commonly known as:  ELAVIL   Used for:  Lumbar radiculopathy, Chronic pain syndrome, MS (multiple sclerosis) (H), Moderate depressive episode (H)        Dose:  100 mg   Take 1 tablet (100 mg) by mouth At Bedtime   Quantity:  90 tablet   Refills:  3       ASPIRIN PO        Dose:  162 mg   Take 162 mg by mouth daily   Refills:  0       * DULOXETINE HCL PO        Dose:  30 mg   Take 30 mg by mouth every morning   Refills:  0       * DULOXETINE HCL PO        Dose:  60 mg   Take 60 mg by mouth every evening   Refills:  0       glatiramer 20 MG/ML injection   Commonly known as:  COPAXONE   Used for:  MS (multiple sclerosis) (H)        Dose:  20 mg   Inject 1 mL (20 mg) Subcutaneous daily   Quantity:  30 each   Refills:  0       lidocaine 5 % Patch   Commonly known as:  LIDODERM   Used for:  Lumbar radiculopathy, Muscle spasms of both lower extremities        Apply 1  patches to low back and right buttock ( cut to fit)  area at once for up to 12 h within a 24 h period.  Remove after 12 hours.   Quantity:  30 patch   Refills:  6       losartan-hydrochlorothiazide 100-25 MG per tablet   Commonly known as:  HYZAAR   Used for:  Hypertension goal BP (blood pressure) < 140/90        Dose:  1 tablet   Take 1 tablet by mouth daily   Quantity:  90 tablet   Refills:  3       metoprolol 50 MG 24 hr tablet   Commonly known as:  TOPROL-XL   Used for:  Hypertension goal BP (blood pressure) < 140/90        Dose:  50 mg   Take 1 tablet (50 mg) by mouth daily   Quantity:  90 tablet   Refills:  3       MULTI-VITAMIN PO        Dose:  1 tablet   Take 1 tablet by mouth daily   Refills:  0       omega-3 fatty acids 1200 MG capsule        Dose:  1 capsule   Take 1 capsule by mouth daily.   Refills:  0       oxyCODONE 10 MG IR tablet   Commonly known as:  ROXICODONE   Used for:  Chronic pain syndrome, MS (multiple sclerosis) (H)        Take 1 to 2 tablet(s) every 4 hours as needed for pain limit 7   tablet(s) per day. 30 day supply . Start 1/19/17, dispense on or after 1/19/17   Quantity:  210 tablet   Refills:  0       VITAMIN D PO        Dose:  1 tablet   Take 1 tablet by mouth daily 1000 IU daily   Refills:  0       * Notice:  This list has 2 medication(s) that are the same as other medications prescribed for you. Read the directions carefully, and ask your doctor or other care provider to review them with you.      STOP taking     CLONAZEPAM PO                Where to get your medicines      These medications were sent to [place], [place] - [address] [facility]  [address] [facility] [address]     Phone:  [phone]    - baclofen 10 MG tablet  - ciprofloxacin 250 MG tablet  - gabapentin 600 MG tablet      Some of these will need a paper prescription and others can be bought over the counter. Ask your nurse if you have questions.     Bring a paper prescription for each of these medications    - ATIVAN 0.5 MG tablet             Protect others around you: Learn how to safely use, store and throw away your medicines at www.disposemymeds.org.             Medication List: This is a list of all your medications and when to take them. Check marks below indicate your daily home schedule. Keep this list as a reference.      Medications           Morning Afternoon Evening Bedtime As Needed    amitriptyline 100 MG tablet   Commonly known as:  ELAVIL   Take 1 tablet (100 mg) by mouth At Bedtime   Last time this was given:  100 mg on 1/24/2017 10:20 PM                                ASPIRIN PO   Take 162 mg by mouth daily   Last time this was given:  81 mg on 1/25/2017  8:38 AM                                ATIVAN 0.5 MG tablet   Three times per day. Take one dose at bedtime. May have 2 other doses every 6 hours if needed for muscle spasms. Max of 3 tabs per day. Do not take if sleepy.   Last time this was given:  1 mg on 1/25/2017  4:36 PM   Generic drug:  LORazepam                                 baclofen 10 MG tablet   Commonly known as:  LIORESAL   Take 1 tablet (10 mg) by mouth 3 times daily   Last time this was given:  10 mg on 1/25/2017  4:36 PM                                ciprofloxacin 250 MG tablet   Commonly known as:  CIPRO   Take 1 tablet (250 mg) by mouth 2 times daily for 7 doses   Last time this was given:  250 mg on 1/25/2017  8:39 AM                                * DULOXETINE HCL PO   Take 30 mg by mouth every morning   Last time this was given:  30 mg on 1/25/2017  8:38 AM                                * DULOXETINE HCL PO   Take 60 mg by mouth every evening   Last time this was given:  30 mg on 1/25/2017  8:38 AM                                gabapentin 600 MG tablet   Commonly known as:  NEURONTIN   Take 1 tablet (600 mg) by mouth 3 times daily                                glatiramer 20 MG/ML injection   Commonly known as:  COPAXONE   Inject 1 mL (20 mg) Subcutaneous daily                                lidocaine 5 % Patch   Commonly known as:  LIDODERM   Apply 1  patches to low back and right buttock ( cut to fit)  area at once for up to 12 h within a 24 h period.  Remove after 12 hours.   Last time this was given:  1 patch on 1/25/2017  8:43 AM                                losartan-hydrochlorothiazide 100-25 MG per tablet   Commonly known as:  HYZAAR   Take 1 tablet by mouth daily                                metoprolol 50 MG 24 hr tablet   Commonly known as:  TOPROL-XL   Take 1 tablet (50 mg) by mouth daily   Last time this was given:  50 mg on 1/25/2017  8:39 AM                                MULTI-VITAMIN PO   Take 1 tablet by mouth daily                                omega-3 fatty acids 1200 MG capsule   Take 1 capsule by mouth daily.                                oxyCODONE 10 MG IR tablet   Commonly known as:  ROXICODONE   Take 1 to 2 tablet(s) every 4 hours as needed for pain limit 7  tablet(s) per day. 30 day supply . Start 1/19/17, dispense on or  after 1/19/17   Last time this was given:  10 mg on 1/25/2017  2:42 PM                                VITAMIN D PO   Take 1 tablet by mouth daily 1000 IU daily                                * Notice:  This list has 2 medication(s) that are the same as other medications prescribed for you. Read the directions carefully, and ask your doctor or other care provider to review them with you.

## 2017-01-20 NOTE — IP AVS SNAPSHOT
[place] [address] [facility]    [address]    [facility] [address]    Phone:  [phone]    Fax:  [phone]                                       After Visit Summary   1/20/2017    Amanda Richardson    MRN: [id number]           After Visit Summary Signature Page     I have received my discharge instructions, and my questions have been answered. I have discussed any challenges I see with this plan with the nurse or doctor.    ..........................................................................................................................................  Patient/Patient Representative Signature      ..........................................................................................................................................  Patient Representative Print Name and Relationship to Patient    ..................................................               ................................................  Date                                            Time    ..........................................................................................................................................  Reviewed by Signature/Title    ...................................................              ..............................................  Date                                                            Time

## 2017-01-20 NOTE — IP AVS SNAPSHOT
` `     [place] [id number] MEDICAL SURGICAL: [phone]            Medication Administration Report for Amnada Richardson as of 01/24/17 1952   Legend:    Given Hold Not Given Due Canceled Entry Other Actions    Time Time (Time) Time  Time-Action       Inactive    Active    Linked        Medications 01/18/17 01/19/17 01/20/17 01/21/17 01/22/17 01/23/17 01/24/17    acetaminophen (TYLENOL) tablet 650 mg  Dose: 650 mg Freq: EVERY 4 HOURS PRN Route: PO  PRN Reason: mild pain  Start: 01/21/17 0319   Admin Instructions: Alternate ibuprofen (if ordered) with acetaminophen.  Maximum acetaminophen dose from all sources = 75 mg/kg/day not to exceed 4 grams/day.        0808 (650 mg)-Given        1814 (650 mg)-Given        2353 (650 mg)-Given            amitriptyline (ELAVIL) tablet 100 mg  Dose: 100 mg Freq: AT BEDTIME Route: PO  Start: 01/21/17 0330       0359 (100 mg)-Given       2207 (100 mg)-Given        2148 (100 mg)-Given        2208 (100 mg)-Given        [ ] 2200           aspirin EC EC tablet 81 mg  Dose: 81 mg Freq: DAILY Route: PO  Start: 01/21/17 0900       0808 (81 mg)-Given        0842 (81 mg)-Given        0823 (81 mg)-Given        0930 (81 mg)-Given           baclofen (LIORESAL) half-tab 5 mg  Dose: 5 mg Freq: 3 TIMES DAILY PRN Route: PO  PRN Reason: muscle spasms  Start: 01/22/17 0033        0118 (5 mg)-Given       1840 (5 mg)-Given        0247 (5 mg)-Given       1329 (5 mg)-Given       2026 (5 mg)-Given        0013 (5 mg)-Given [C]       1230 (5 mg)-Given           baclofen (LIORESAL) tablet 10 mg  Dose: 10 mg Freq: 3 TIMES DAILY Route: PO  Start: 01/23/17 2200         2209 (10 mg)-Given        0931 (10 mg)-Given       1624 (10 mg)-Given       [ ] 2200           bisacodyl (DULCOLAX) Suppository 10 mg  Dose: 10 mg Freq: DAILY PRN Route: RE  PRN Reason: constipation  Start: 01/21/17 0319   Admin Instructions: Hold for loose stools.  This is the third step of a three step constipation treatment protocol.                ciprofloxacin (CIPRO) tablet 250 mg  Dose: 250 mg Freq: EVERY 12 HOURS SCHEDULED Route: PO  Indications of Use: URINARY TRACT INFECTION  Start: 01/22/17 0800   Admin Instructions: Administer at least 2 hours before or 4 hours after aluminum, calcium, iron, zinc or magnesium containing medications. May be taken with food or on an empty stomach.  Do not administer alone with a dairy product like milk or yogurt or calcium-fortified juice,  but may be administered with a meal containing dairy.         0845 (250 mg)-Given       2019 (250 mg)-Given        0823 (250 mg)-Given       2022 (250 mg)-Given        0931 (250 mg)-Given       [ ] 2000           DULoxetine (CYMBALTA) EC capsule 30 mg  Dose: 30 mg Freq: EVERY MORNING Route: PO  Start: 01/22/17 0900        0843 (30 mg)-Given        0822 (30 mg)-Given        0932 (30 mg)-Given           DULoxetine (CYMBALTA) EC capsule 60 mg  Dose: 60 mg Freq: EVERY EVENING Route: PO  Start: 01/21/17 2000       2030 (60 mg)-Given        2019 (60 mg)-Given        2022 (60 mg)-Given        [ ] 2000           enoxaparin (LOVENOX) injection 40 mg  Dose: 40 mg Freq: EVERY 24 HOURS Route: SC  Start: 01/22/17 1330        1416 (40 mg)-Given        1333 (40 mg)-Given        1450 (40 mg)-Given           gabapentin (NEURONTIN) capsule 600 mg  Dose: 600 mg Freq: 3 TIMES DAILY Route: PO  Start: 01/23/17 2200         2208 (600 mg)-Given        0929 (600 mg)-Given       1623 (600 mg)-Given       [ ] 2200           hydrALAZINE (APRESOLINE) injection 10 mg  Dose: 10 mg Freq: EVERY 6 HOURS PRN Route: IV  PRN Reason: high blood pressure  PRN Comment: for SBP > 160 or   Start: 01/22/17 0741              ibuprofen (ADVIL/MOTRIN) tablet 600 mg  Dose: 600 mg Freq: EVERY 6 HOURS PRN Route: PO  PRN Reason: moderate pain  Start: 01/21/17 0623       0650 (600 mg)-Given        0712 (600 mg)-Given [C]       1613 (600 mg)-Given        0605 (600 mg)-Given        1130 (600 mg)-Given       1802 (600  mg)-Given           lidocaine (LIDODERM) 5 % Patch 1 patch  Dose: 1 patch Freq: EVERY 24 HOURS 0800 Route: TD  Start: 01/21/17 0345   Admin Instructions: Apply patch(s) to affected area. To prevent lidocaine toxicity, patient should be patch free for 12 hrs daily. Patches may be cut to smaller size prior to removing release liner.  NEVER APPLY HEAT OVER PATCH which will increase absorption and may lead to risk of local anesthetic toxicity.  Do not apply over area where liposomal bupivacaine was injected for 96 hours post injection.        0352 (1 patch)-Given       0810 (1 patch)-Patch Removed [C]       0909 (1 patch)-Given [C]        0846 (1 patch)-Given        0821 (1 patch)-Given        0953 (1 patch)-Given [C]           lidocaine (LIDODERM) Patch in Place  Freq: EVERY 8 HOURS Route: TD  Start: 01/21/17 0345   Admin Instructions: Chart every shift, confirming that patch is still in place on patient (no barcode scan needed). See patch order for dose information.  NEVER APPLY HEAT OVER PATCH which will increase absorption and may lead to risk of local anesthetic toxicity. Do not apply over area where liposomal bupivacaine injected for 96 hours.        0354 ( )-Patch in Place       1216 ( )-Patch in Place       2036 ( )-Patch Removed        0253 ( )-Negative       1207 ( )-Patch in Place       2026 ( )-Patch Removed        0251 ( )-Negative       1109 ( )-Patch in Place       1954 ( )-Patch in Place        0252 ( )-Negative       1223 ( )-Patch in Place       [ ] 1945           lidocaine (LIDODERM) patch REMOVAL  Freq: EVERY 24 HOURS 2000 Route: TD  Start: 01/21/17 2000   Admin Instructions: Remove lidocaine Patch.        2036 ( )-Patch Removed        2026 ( )-Patch Removed        2022 ( )-Patch Removed        [ ] 2000           LORazepam (ATIVAN) tablet 0.5-1 mg  Dose: 0.5-1 mg Freq: EVERY 6 HOURS PRN Route: PO  PRN Reasons: anxiety,muscle spasms  Start: 01/23/17 1314   Admin Instructions: Offer oral route if  possible          1330 (1 mg)-Given        0932 (1 mg)-Given       1623 (1 mg)-Given          Or  LORazepam (ATIVAN) injection 0.5-1 mg  Dose: 0.5-1 mg Freq: EVERY 6 HOURS PRN Route: IV  PRN Reasons: anxiety,muscle spasms  Start: 01/23/17 1314   Admin Instructions: Offer oral route if possible  For IV PUSH: Dilute with equal volume of NS.                                    losartan-hydrochlorothiazide (HYZAAR) 50-12.5 MG per tablet 2 tablet  Dose: 2 tablet Freq: DAILY Route: PO  Start: 01/21/17 0900       1303 (2 tablet)-Given        0843 (2 tablet)-Given        0822 (2 tablet)-Given        0929 (2 tablet)-Given           metoprolol (TOPROL-XL) 24 hr tablet 50 mg  Dose: 50 mg Freq: DAILY Route: PO  Start: 01/21/17 0900   Admin Instructions: DO NOT CRUSH. Tablet may be split in half along score line.        1303 (50 mg)-Given        0842 (50 mg)-Given        0823 (50 mg)-Given        0930 (50 mg)-Given           miconazole (MICATIN; MICRO GUARD) 2 % powder  Freq: EVERY 1 HOUR PRN Route: Top  PRN Reason: other  PRN Comment: topical candidiasis  Start: 01/21/17 2201   Admin Instructions: Apply to affected area.               naloxone (NARCAN) injection 0.1-0.4 mg  Dose: 0.1-0.4 mg Freq: EVERY 2 MIN PRN Route: IV  PRN Reason: opioid reversal  Start: 01/21/17 0318   Admin Instructions: For respiratory rate LESS than or EQUAL to 8.  Partial reversal dose:  0.1 mg titrated q 2 minutes for Analgesia Side Effects Monitoring Sedation Level of 3 (frequently drowsy, arousable, drifts to sleep during conversation).Full reversal dose:  0.4 mg bolus for Analgesia Side Effects Monitoring Sedation Level of 4 (somnolent, minimal or no response to stimulation).               nicotine (NICOTROL) Inhaler 1 cartridge  Dose: 1 cartridge Freq: EVERY 1 HOUR PRN Route: IN  PRN Reason: smoking cessation  Start: 01/21/17 0326   Admin Instructions: Each cartridge delivers 4 mg.               ondansetron (ZOFRAN-ODT) ODT tab 4 mg  Dose: 4 mg  Freq: EVERY 6 HOURS PRN Route: PO  PRN Reason: nausea  Start: 01/21/17 0319   Admin Instructions: This is Step 1 of nausea and vomiting management.  If nausea not resolved in 15 minutes, go to Step 2 prochlorperazine (COMPAZINE). Do not push through foil backing. Peel back foil and gently remove. Place on tongue immediately. Administration with liquid unnecessary              Or  ondansetron (ZOFRAN) injection 4 mg  Dose: 4 mg Freq: EVERY 6 HOURS PRN Route: IV  PRN Reasons: nausea,vomiting  Start: 01/21/17 0319   Admin Instructions: This is Step 1 of nausea and vomiting management.  If nausea not resolved in 15 minutes, go to Step 2 prochlorperazine (COMPAZINE).               oxyCODONE (ROXICODONE) IR tablet 10 mg  Dose: 10 mg Freq: EVERY 4 HOURS PRN Route: PO  PRN Reason: moderate to severe pain  Start: 01/23/17 0845   Admin Instructions: If age greater than 65 use lower dose for first time use.          1502 (10 mg)-Given       1910 (10 mg)-Given       2353 (10 mg)-Given        1130 (10 mg)-Given       1802 (10 mg)-Given           polyethylene glycol (MIRALAX/GLYCOLAX) Packet 17 g  Dose: 17 g Freq: DAILY PRN Route: PO  PRN Reason: constipation  Start: 01/21/17 0319   Admin Instructions: Give in 8oz of  water, juice, or soda. Hold for loose stools.  This is the second step of a three step constipation treatment protocol.  1 Packet = 17 grams. Mixed prescribed dose in 8 ounces of water. Follow with 8 oz. of water.               prochlorperazine (COMPAZINE) injection 5-10 mg  Dose: 5-10 mg Freq: EVERY 6 HOURS PRN Route: IV  PRN Reasons: nausea,vomiting  Start: 01/21/17 0319   Admin Instructions: This is Step 2 of nausea and vomiting management.   If nausea not resolved in 15 minutes, give metoclopramide (REGLAN) if ordered (step 3 of nausea and vomiting management)              Or  prochlorperazine (COMPAZINE) tablet 5-10 mg  Dose: 5-10 mg Freq: EVERY 6 HOURS PRN Route: PO  PRN Reason: vomiting  Start: 01/21/17 0319    Admin Instructions: This is Step 2 of nausea and vomiting management.   If nausea not resolved in 15 minutes, give metoclopramide (REGLANI) if ordered (step 3 of nausea and vomiting management)              Or  prochlorperazine (COMPAZINE) Suppository 25 mg  Dose: 25 mg Freq: EVERY 12 HOURS PRN Route: RE  PRN Reasons: nausea,vomiting  Start: 01/21/17 0319   Admin Instructions: This is Step 2 of nausea and vomiting management.   If nausea not resolved in 15 minutes, give metoclopramide (REGLAN) if ordered (step 3 of nausea and vomiting management)               senna-docusate (SENOKOT-S;PERICOLACE) 8.6-50 MG per tablet 1-2 tablet  Dose: 1-2 tablet Freq: 2 TIMES DAILY PRN Route: PO  PRN Comment: constipation   Start: 01/21/17 0319   Admin Instructions: If no bowel movement in 24 hours, increase to 2 tablets PO BID.  Hold for loose stools.   This is the first step of a three step constipation treatment protocol.          1330 (1 tablet)-Given        0954 (2 tablet)-Given          Future Medications  Medications 01/18/17 01/19/17 01/20/17 01/21/17 01/22/17 01/23/17 01/24/17       LORazepam (ATIVAN) tablet 0.5 mg  Dose: 0.5 mg Freq: AT BEDTIME Route: PO  Start: 01/24/17 2200   Admin Instructions: See also prn dosing of lorazepam for muscle spasms. Hold for sedation.           [ ] 2200          Completed Medications  Medications 01/18/17 01/19/17 01/20/17 01/21/17 01/22/17 01/23/17 01/24/17         Dose: 125 mg Freq: DAILY Route: IV  Start: 01/22/17 1345   End: 01/24/17 0954   Admin Instructions: Doses greater than 125 mg need to be in at least 50 mL IVPB         1459 (125 mg)-Given        0946 (125 mg)-Given        0954 (125 mg)-Given          Discontinued Medications  Medications 01/18/17 01/19/17 01/20/17 01/21/17 01/22/17 01/23/17 01/24/17         Rate: 100 mL/hr Freq: CONTINUOUS Route: IV  Last Dose: Stopped (01/21/17 2243)  Start: 01/21/17 0330   End: 01/22/17 1315       0400 ( )-New Bag       2243-Stopped         1318-Med Discontinued           Dose: 5 mg Freq: 3 TIMES DAILY Route: PO  Start: 01/21/17 1130   End: 01/23/17 1754       1253 (5 mg)-Given       1817 (5 mg)-Given       2208 (5 mg)-Given        0843 (5 mg)-Given       1614 (5 mg)-Given       2149 (5 mg)-Given        0824 (5 mg)-Given       1550 (5 mg)-Given       1754-Med Discontinued          Rate: 100 mL/hr Freq: CONTINUOUS Route: IV  Start: 01/22/17 1330   End: 01/22/17 1713        1336 ( )-New Bag       1713-Med Discontinued           Dose: 300 mg Freq: 4 TIMES DAILY Route: PO  Start: 01/21/17 0900   End: 01/23/17 1754       0808 (300 mg)-Given       1254 (300 mg)-Given       1740 (300 mg)-Given       2206 (300 mg)-Given        0842 (300 mg)-Given       1219 (300 mg)-Given       1803 (300 mg)-Given       2148 (300 mg)-Given        0823 (300 mg)-Given       1333 (300 mg)-Given       1754-Med Discontinued          Dose: 0.3-0.5 mg Freq: EVERY 2 HOURS PRN Route: IV  PRN Reasons: moderate to severe pain,severe pain  Start: 01/21/17 1417   End: 01/23/17 0833       1517 (0.5 mg)-Given         0833-Med Discontinued          Dose: 25-50 mg Freq: EVERY 6 HOURS PRN Route: PO  PRN Comment: pain  Start: 01/21/17 0317   End: 01/23/17 1417       0519 (50 mg)-Given       (1428)-Not Given       2030 (50 mg)-Given        1219 (50 mg)-Given       1814 (50 mg)-Given        0246 (50 mg)-Given       1417-Med Discontinued          Dose: 1 mg Freq: EVERY 6 HOURS PRN Route: IV  PRN Reasons: anxiety,muscle spasms,agitation,aggression  Start: 01/23/17 0845   End: 01/23/17 1315   Admin Instructions: For IV PUSH: Dilute with equal volume of NS.          1315-Med Discontinued          Dose: 1 mg Freq: EVERY 4 HOURS PRN Route: IV  PRN Reasons: anxiety,muscle spasms,agitation,aggression  Start: 01/21/17 1430   End: 01/23/17 0833   Admin Instructions: For IV PUSH: Dilute with equal volume of NS.         0228 (1 mg)-Given       0713 (1 mg)-Given [C]       2150 (1 mg)-Given        0444 (1  mg)-Given       0833-Med Discontinued          Dose: 0.5-1 mg Freq: EVERY 6 HOURS PRN Route: PO  PRN Reasons: anxiety,muscle spasms  Start: 01/24/17 0847   End: 01/24/17 0933   Admin Instructions: Offer oral route if possible           0933-Med Discontinued      Or    Dose: 0.5-1 mg Freq: EVERY 6 HOURS PRN Route: IV  PRN Reasons: anxiety,muscle spasms  Start: 01/24/17 0847   End: 01/24/17 0933   Admin Instructions: Offer oral route if possible  For IV PUSH: Dilute with equal volume of NS.           0933-Med Discontinued         Freq: EVERY 1 HOUR PRN Route: Top  PRN Reason: other  PRN Comment: topical candidiasis  Start: 01/21/17 1508   End: 01/23/17 1405   Admin Instructions: Apply to affected area.          1405-Med Discontinued          Dose: 15 mg Freq: EVERY 3 HOURS PRN Route: PO  PRN Reason: moderate to severe pain  Start: 01/21/17 1536   End: 01/23/17 0833   Admin Instructions: If age greater than 65 use lower dose for first time use.        1625 (15 mg)-Given       2025 (15 mg)-Given       2345 (15 mg)-Given        2023 (15 mg)-Given        0443 (15 mg)-Given       0833-Med Discontinued          Dose: 15 mg Freq: AT BEDTIME PRN Route: PO  PRN Reason: sleep  Start: 01/21/17 0319   End: 01/23/17 0832   Admin Instructions: May repeat x 1 if initial dose is 7.5 mg.  Do not repeat 15 mg dose.    Do not give unless at least 6 hours of uninterrupted sleep is expected.          0832-Med Discontinued     Medications 01/18/17 01/19/17 01/20/17 01/21/17 01/22/17 01/23/17 01/24/17

## 2017-01-21 ENCOUNTER — ANESTHESIA (OUTPATIENT)
Dept: MEDSURG UNIT | Facility: CLINIC | Age: 52
DRG: 059 | End: 2017-01-21
Payer: MEDICARE

## 2017-01-21 ENCOUNTER — ANESTHESIA EVENT (OUTPATIENT)
Dept: MEDSURG UNIT | Facility: CLINIC | Age: 52
DRG: 059 | End: 2017-01-21
Payer: MEDICARE

## 2017-01-21 PROBLEM — M62.838 LEG MUSCLE SPASM: Status: ACTIVE | Noted: 2017-01-21

## 2017-01-21 LAB
ALBUMIN UR-MCNC: 10 MG/DL
ANION GAP SERPL CALCULATED.3IONS-SCNC: 8 MMOL/L (ref 3–14)
APPEARANCE UR: ABNORMAL
BACTERIA #/AREA URNS HPF: ABNORMAL /HPF
BACTERIA SPEC CULT: NORMAL
BASOPHILS # BLD AUTO: 0.1 10E9/L (ref 0–0.2)
BASOPHILS NFR BLD AUTO: 0.5 %
BILIRUB UR QL STRIP: NEGATIVE
BUN SERPL-MCNC: 19 MG/DL (ref 7–30)
CALCIUM SERPL-MCNC: 8.4 MG/DL (ref 8.5–10.1)
CAOX CRY #/AREA URNS HPF: ABNORMAL /HPF
CHLORIDE SERPL-SCNC: 105 MMOL/L (ref 94–109)
CO2 SERPL-SCNC: 26 MMOL/L (ref 20–32)
COLOR UR AUTO: ABNORMAL
CREAT SERPL-MCNC: 0.44 MG/DL (ref 0.52–1.04)
DIFFERENTIAL METHOD BLD: ABNORMAL
EOSINOPHIL # BLD AUTO: 0.3 10E9/L (ref 0–0.7)
EOSINOPHIL NFR BLD AUTO: 1.8 %
ERYTHROCYTE [DISTWIDTH] IN BLOOD BY AUTOMATED COUNT: 15.4 % (ref 10–15)
GFR SERPL CREATININE-BSD FRML MDRD: ABNORMAL ML/MIN/1.7M2
GLUCOSE BLDC GLUCOMTR-MCNC: 97 MG/DL (ref 70–99)
GLUCOSE SERPL-MCNC: 102 MG/DL (ref 70–99)
GLUCOSE UR STRIP-MCNC: NEGATIVE MG/DL
HCT VFR BLD AUTO: 40.7 % (ref 35–47)
HGB BLD-MCNC: 13.1 G/DL (ref 11.7–15.7)
HGB UR QL STRIP: ABNORMAL
IMM GRANULOCYTES # BLD: 0.1 10E9/L (ref 0–0.4)
IMM GRANULOCYTES NFR BLD: 0.4 %
KETONES UR STRIP-MCNC: NEGATIVE MG/DL
LACTATE BLD-SCNC: 0.6 MMOL/L (ref 0.7–2.1)
LEUKOCYTE ESTERASE UR QL STRIP: ABNORMAL
LYMPHOCYTES # BLD AUTO: 3.1 10E9/L (ref 0.8–5.3)
LYMPHOCYTES NFR BLD AUTO: 19.6 %
MCH RBC QN AUTO: 25.9 PG (ref 26.5–33)
MCHC RBC AUTO-ENTMCNC: 32.2 G/DL (ref 31.5–36.5)
MCV RBC AUTO: 81 FL (ref 78–100)
MICRO REPORT STATUS: NORMAL
MONOCYTES # BLD AUTO: 0.7 10E9/L (ref 0–1.3)
MONOCYTES NFR BLD AUTO: 4.6 %
MUCOUS THREADS #/AREA URNS LPF: PRESENT /LPF
NEUTROPHILS # BLD AUTO: 11.7 10E9/L (ref 1.6–8.3)
NEUTROPHILS NFR BLD AUTO: 73.1 %
NITRATE UR QL: POSITIVE
NRBC # BLD AUTO: 0 10*3/UL
NRBC BLD AUTO-RTO: 0 /100
PH UR STRIP: 7 PH (ref 5–7)
PLATELET # BLD AUTO: 325 10E9/L (ref 150–450)
POTASSIUM SERPL-SCNC: 3.5 MMOL/L (ref 3.4–5.3)
RBC # BLD AUTO: 5.05 10E12/L (ref 3.8–5.2)
RBC #/AREA URNS AUTO: 3 /HPF (ref 0–2)
SODIUM SERPL-SCNC: 139 MMOL/L (ref 133–144)
SP GR UR STRIP: 1.01 (ref 1–1.03)
SPECIMEN SOURCE: NORMAL
SQUAMOUS #/AREA URNS AUTO: <1 /HPF (ref 0–1)
URN SPEC COLLECT METH UR: ABNORMAL
UROBILINOGEN UR STRIP-MCNC: NORMAL MG/DL (ref 0–2)
WBC # BLD AUTO: 16 10E9/L (ref 4–11)
WBC #/AREA URNS AUTO: 7 /HPF (ref 0–2)

## 2017-01-21 PROCEDURE — 37000011 ZZH ANESTHESIA WARD SERVICE

## 2017-01-21 PROCEDURE — A9270 NON-COVERED ITEM OR SERVICE: HCPCS | Mod: GY | Performed by: INTERNAL MEDICINE

## 2017-01-21 PROCEDURE — 40000671 ZZH STATISTIC ANESTHESIA CASE

## 2017-01-21 PROCEDURE — 87088 URINE BACTERIA CULTURE: CPT | Performed by: INTERNAL MEDICINE

## 2017-01-21 PROCEDURE — 25000128 H RX IP 250 OP 636: Performed by: INTERNAL MEDICINE

## 2017-01-21 PROCEDURE — 96374 THER/PROPH/DIAG INJ IV PUSH: CPT

## 2017-01-21 PROCEDURE — A9270 NON-COVERED ITEM OR SERVICE: HCPCS | Mod: GY | Performed by: NURSE PRACTITIONER

## 2017-01-21 PROCEDURE — 36415 COLL VENOUS BLD VENIPUNCTURE: CPT | Performed by: NURSE PRACTITIONER

## 2017-01-21 PROCEDURE — 25000132 ZZH RX MED GY IP 250 OP 250 PS 637: Mod: GY | Performed by: NURSE PRACTITIONER

## 2017-01-21 PROCEDURE — 81001 URINALYSIS AUTO W/SCOPE: CPT | Performed by: INTERNAL MEDICINE

## 2017-01-21 PROCEDURE — 85025 COMPLETE CBC W/AUTO DIFF WBC: CPT | Performed by: NURSE PRACTITIONER

## 2017-01-21 PROCEDURE — 36415 COLL VENOUS BLD VENIPUNCTURE: CPT | Performed by: INTERNAL MEDICINE

## 2017-01-21 PROCEDURE — 96375 TX/PRO/DX INJ NEW DRUG ADDON: CPT

## 2017-01-21 PROCEDURE — 87186 SC STD MICRODIL/AGAR DIL: CPT | Performed by: INTERNAL MEDICINE

## 2017-01-21 PROCEDURE — G0378 HOSPITAL OBSERVATION PER HR: HCPCS

## 2017-01-21 PROCEDURE — 25000125 ZZHC RX 250: Performed by: INTERNAL MEDICINE

## 2017-01-21 PROCEDURE — 25000132 ZZH RX MED GY IP 250 OP 250 PS 637: Mod: GY | Performed by: INTERNAL MEDICINE

## 2017-01-21 PROCEDURE — 83605 ASSAY OF LACTIC ACID: CPT | Performed by: INTERNAL MEDICINE

## 2017-01-21 PROCEDURE — 00000146 ZZHCL STATISTIC GLUCOSE BY METER IP

## 2017-01-21 PROCEDURE — 80048 BASIC METABOLIC PNL TOTAL CA: CPT | Performed by: NURSE PRACTITIONER

## 2017-01-21 PROCEDURE — 99220 ZZC INITIAL OBSERVATION CARE,LEVL III: CPT | Performed by: INTERNAL MEDICINE

## 2017-01-21 PROCEDURE — 87086 URINE CULTURE/COLONY COUNT: CPT | Performed by: INTERNAL MEDICINE

## 2017-01-21 RX ORDER — GABAPENTIN 300 MG/1
300 CAPSULE ORAL 4 TIMES DAILY
Status: DISCONTINUED | OUTPATIENT
Start: 2017-01-21 | End: 2017-01-23

## 2017-01-21 RX ORDER — DIAZEPAM 5 MG
5 TABLET ORAL EVERY 6 HOURS PRN
Status: DISCONTINUED | OUTPATIENT
Start: 2017-01-21 | End: 2017-01-21

## 2017-01-21 RX ORDER — SODIUM CHLORIDE 9 MG/ML
INJECTION, SOLUTION INTRAVENOUS CONTINUOUS
Status: DISCONTINUED | OUTPATIENT
Start: 2017-01-21 | End: 2017-01-22

## 2017-01-21 RX ORDER — LOSARTAN POTASSIUM AND HYDROCHLOROTHIAZIDE 12.5; 5 MG/1; MG/1
2 TABLET ORAL DAILY
Status: DISCONTINUED | OUTPATIENT
Start: 2017-01-21 | End: 2017-01-25 | Stop reason: HOSPADM

## 2017-01-21 RX ORDER — HYDROXYZINE HYDROCHLORIDE 25 MG/1
25-50 TABLET, FILM COATED ORAL EVERY 6 HOURS PRN
Status: DISCONTINUED | OUTPATIENT
Start: 2017-01-21 | End: 2017-01-23

## 2017-01-21 RX ORDER — HYDROMORPHONE HYDROCHLORIDE 1 MG/ML
.3-.5 INJECTION, SOLUTION INTRAMUSCULAR; INTRAVENOUS; SUBCUTANEOUS
Status: DISCONTINUED | OUTPATIENT
Start: 2017-01-21 | End: 2017-01-23

## 2017-01-21 RX ORDER — DULOXETIN HYDROCHLORIDE 30 MG/1
30 CAPSULE, DELAYED RELEASE ORAL EVERY MORNING
Status: DISCONTINUED | OUTPATIENT
Start: 2017-01-22 | End: 2017-01-25 | Stop reason: HOSPADM

## 2017-01-21 RX ORDER — ASPIRIN 81 MG/1
81 TABLET ORAL DAILY
Status: DISCONTINUED | OUTPATIENT
Start: 2017-01-21 | End: 2017-01-25 | Stop reason: HOSPADM

## 2017-01-21 RX ORDER — ONDANSETRON 4 MG/1
4 TABLET, ORALLY DISINTEGRATING ORAL EVERY 6 HOURS PRN
Status: DISCONTINUED | OUTPATIENT
Start: 2017-01-21 | End: 2017-01-25 | Stop reason: HOSPADM

## 2017-01-21 RX ORDER — BISACODYL 10 MG
10 SUPPOSITORY, RECTAL RECTAL DAILY PRN
Status: DISCONTINUED | OUTPATIENT
Start: 2017-01-21 | End: 2017-01-25 | Stop reason: HOSPADM

## 2017-01-21 RX ORDER — LORAZEPAM 2 MG/ML
1 INJECTION INTRAMUSCULAR EVERY 6 HOURS PRN
Status: DISCONTINUED | OUTPATIENT
Start: 2017-01-21 | End: 2017-01-21

## 2017-01-21 RX ORDER — DULOXETIN HYDROCHLORIDE 30 MG/1
30 CAPSULE, DELAYED RELEASE ORAL 2 TIMES DAILY
Status: DISCONTINUED | OUTPATIENT
Start: 2017-01-21 | End: 2017-01-21

## 2017-01-21 RX ORDER — ACETAMINOPHEN 325 MG/1
650 TABLET ORAL EVERY 4 HOURS PRN
Status: DISCONTINUED | OUTPATIENT
Start: 2017-01-21 | End: 2017-01-25 | Stop reason: HOSPADM

## 2017-01-21 RX ORDER — PROCHLORPERAZINE 25 MG
25 SUPPOSITORY, RECTAL RECTAL EVERY 12 HOURS PRN
Status: DISCONTINUED | OUTPATIENT
Start: 2017-01-21 | End: 2017-01-25 | Stop reason: HOSPADM

## 2017-01-21 RX ORDER — METOPROLOL SUCCINATE 50 MG/1
50 TABLET, EXTENDED RELEASE ORAL DAILY
Status: DISCONTINUED | OUTPATIENT
Start: 2017-01-21 | End: 2017-01-25 | Stop reason: HOSPADM

## 2017-01-21 RX ORDER — OXYCODONE HYDROCHLORIDE 5 MG/1
10 TABLET ORAL ONCE
Status: COMPLETED | OUTPATIENT
Start: 2017-01-21 | End: 2017-01-21

## 2017-01-21 RX ORDER — OXYCODONE HYDROCHLORIDE 5 MG/1
5-10 TABLET ORAL
Status: DISCONTINUED | OUTPATIENT
Start: 2017-01-21 | End: 2017-01-21

## 2017-01-21 RX ORDER — PROCHLORPERAZINE MALEATE 5 MG
5-10 TABLET ORAL EVERY 6 HOURS PRN
Status: DISCONTINUED | OUTPATIENT
Start: 2017-01-21 | End: 2017-01-25 | Stop reason: HOSPADM

## 2017-01-21 RX ORDER — LIDOCAINE 50 MG/G
1 PATCH TOPICAL
Status: DISCONTINUED | OUTPATIENT
Start: 2017-01-21 | End: 2017-01-25 | Stop reason: HOSPADM

## 2017-01-21 RX ORDER — ONDANSETRON 2 MG/ML
4 INJECTION INTRAMUSCULAR; INTRAVENOUS EVERY 6 HOURS PRN
Status: DISCONTINUED | OUTPATIENT
Start: 2017-01-21 | End: 2017-01-25 | Stop reason: HOSPADM

## 2017-01-21 RX ORDER — DULOXETIN HYDROCHLORIDE 30 MG/1
60 CAPSULE, DELAYED RELEASE ORAL EVERY EVENING
Status: DISCONTINUED | OUTPATIENT
Start: 2017-01-21 | End: 2017-01-25 | Stop reason: HOSPADM

## 2017-01-21 RX ORDER — IBUPROFEN 600 MG/1
600 TABLET, FILM COATED ORAL EVERY 6 HOURS PRN
Status: DISCONTINUED | OUTPATIENT
Start: 2017-01-21 | End: 2017-01-25 | Stop reason: HOSPADM

## 2017-01-21 RX ORDER — ASPIRIN 81 MG/1
162 TABLET, CHEWABLE ORAL DAILY
Status: DISCONTINUED | OUTPATIENT
Start: 2017-01-21 | End: 2017-01-21

## 2017-01-21 RX ORDER — OXYCODONE HYDROCHLORIDE 5 MG/1
15 TABLET ORAL
Status: DISCONTINUED | OUTPATIENT
Start: 2017-01-21 | End: 2017-01-23

## 2017-01-21 RX ORDER — AMOXICILLIN 250 MG
1-2 CAPSULE ORAL 2 TIMES DAILY PRN
Status: DISCONTINUED | OUTPATIENT
Start: 2017-01-21 | End: 2017-01-25 | Stop reason: HOSPADM

## 2017-01-21 RX ORDER — NALOXONE HYDROCHLORIDE 0.4 MG/ML
.1-.4 INJECTION, SOLUTION INTRAMUSCULAR; INTRAVENOUS; SUBCUTANEOUS
Status: DISCONTINUED | OUTPATIENT
Start: 2017-01-21 | End: 2017-01-25 | Stop reason: HOSPADM

## 2017-01-21 RX ORDER — BACLOFEN 10 MG/1
5 TABLET ORAL 3 TIMES DAILY
Status: DISCONTINUED | OUTPATIENT
Start: 2017-01-21 | End: 2017-01-23

## 2017-01-21 RX ORDER — TEMAZEPAM 7.5 MG/1
15 CAPSULE ORAL
Status: DISCONTINUED | OUTPATIENT
Start: 2017-01-21 | End: 2017-01-23

## 2017-01-21 RX ORDER — LORAZEPAM 2 MG/ML
1 INJECTION INTRAMUSCULAR EVERY 4 HOURS PRN
Status: DISCONTINUED | OUTPATIENT
Start: 2017-01-21 | End: 2017-01-23

## 2017-01-21 RX ORDER — POLYETHYLENE GLYCOL 3350 17 G/17G
17 POWDER, FOR SOLUTION ORAL DAILY PRN
Status: DISCONTINUED | OUTPATIENT
Start: 2017-01-21 | End: 2017-01-25 | Stop reason: HOSPADM

## 2017-01-21 RX ADMIN — AMITRIPTYLINE HYDROCHLORIDE 100 MG: 25 TABLET, FILM COATED ORAL at 22:07

## 2017-01-21 RX ADMIN — OXYCODONE HYDROCHLORIDE 10 MG: 5 TABLET ORAL at 06:54

## 2017-01-21 RX ADMIN — LIDOCAINE 1 PATCH: 50 PATCH CUTANEOUS at 09:09

## 2017-01-21 RX ADMIN — Medication 5 MG: at 12:53

## 2017-01-21 RX ADMIN — LIDOCAINE 1 PATCH: 50 PATCH CUTANEOUS at 03:52

## 2017-01-21 RX ADMIN — LORAZEPAM 1 MG: 2 INJECTION INTRAMUSCULAR; INTRAVENOUS at 12:16

## 2017-01-21 RX ADMIN — DIAZEPAM 5 MG: 5 TABLET ORAL at 09:51

## 2017-01-21 RX ADMIN — OXYCODONE HYDROCHLORIDE 10 MG: 5 TABLET ORAL at 00:03

## 2017-01-21 RX ADMIN — Medication 5 MG: at 22:08

## 2017-01-21 RX ADMIN — OXYCODONE HYDROCHLORIDE 10 MG: 5 TABLET ORAL at 00:37

## 2017-01-21 RX ADMIN — HYDROXYZINE HYDROCHLORIDE 50 MG: 25 TABLET ORAL at 20:30

## 2017-01-21 RX ADMIN — GABAPENTIN 300 MG: 300 CAPSULE ORAL at 22:06

## 2017-01-21 RX ADMIN — HYDROMORPHONE HYDROCHLORIDE 0.5 MG: 10 INJECTION, SOLUTION INTRAMUSCULAR; INTRAVENOUS; SUBCUTANEOUS at 15:17

## 2017-01-21 RX ADMIN — SODIUM CHLORIDE: 9 INJECTION, SOLUTION INTRAVENOUS at 04:00

## 2017-01-21 RX ADMIN — OXYCODONE HYDROCHLORIDE 15 MG: 5 TABLET ORAL at 16:25

## 2017-01-21 RX ADMIN — OXYCODONE HYDROCHLORIDE 10 MG: 5 TABLET ORAL at 09:50

## 2017-01-21 RX ADMIN — OXYCODONE HYDROCHLORIDE 15 MG: 5 TABLET ORAL at 20:25

## 2017-01-21 RX ADMIN — ASPIRIN 81 MG: 81 TABLET, COATED ORAL at 08:08

## 2017-01-21 RX ADMIN — OXYCODONE HYDROCHLORIDE 10 MG: 5 TABLET ORAL at 03:50

## 2017-01-21 RX ADMIN — Medication 5 MG: at 18:17

## 2017-01-21 RX ADMIN — METOPROLOL SUCCINATE 50 MG: 50 TABLET, EXTENDED RELEASE ORAL at 13:03

## 2017-01-21 RX ADMIN — OXYCODONE HYDROCHLORIDE 15 MG: 5 TABLET ORAL at 23:45

## 2017-01-21 RX ADMIN — GABAPENTIN 300 MG: 300 CAPSULE ORAL at 17:40

## 2017-01-21 RX ADMIN — GABAPENTIN 300 MG: 300 CAPSULE ORAL at 12:54

## 2017-01-21 RX ADMIN — AMITRIPTYLINE HYDROCHLORIDE 100 MG: 25 TABLET, FILM COATED ORAL at 03:59

## 2017-01-21 RX ADMIN — LOSARTAN POTASSIUM AND HYDROCHLOROTHIAZIDE 2 TABLET: 50; 12.5 TABLET, FILM COATED ORAL at 13:03

## 2017-01-21 RX ADMIN — DULOXETINE HYDROCHLORIDE 30 MG: 30 CAPSULE, DELAYED RELEASE ORAL at 08:08

## 2017-01-21 RX ADMIN — DULOXETINE HYDROCHLORIDE 60 MG: 30 CAPSULE, DELAYED RELEASE ORAL at 20:30

## 2017-01-21 RX ADMIN — DIAZEPAM 5 MG: 5 TABLET ORAL at 03:50

## 2017-01-21 RX ADMIN — HYDROXYZINE HYDROCHLORIDE 50 MG: 25 TABLET ORAL at 05:19

## 2017-01-21 RX ADMIN — IBUPROFEN 600 MG: 600 TABLET ORAL at 06:50

## 2017-01-21 RX ADMIN — DIAZEPAM 5 MG: 5 TABLET ORAL at 00:03

## 2017-01-21 RX ADMIN — ACETAMINOPHEN 650 MG: 325 TABLET, FILM COATED ORAL at 08:08

## 2017-01-21 RX ADMIN — GABAPENTIN 300 MG: 300 CAPSULE ORAL at 08:08

## 2017-01-21 ASSESSMENT — PAIN DESCRIPTION - DESCRIPTORS
DESCRIPTORS: SHARP
DESCRIPTORS: SHARP;SPASM

## 2017-01-21 NOTE — TELEPHONE ENCOUNTER
"Responded to call from patient's  at 1230 AM regarding medication issues.   states that they were instructed to increase pain medication by her provider but that it is not helping with her pain.   states that she is having severe spasm causing her pain so they are at the ER where she received more pain medication without help.      ER provider noted \"Discussed medications with pt; pt states \"you might as well give me Advil; I need to get at least 30 mg of oxycodone to make a dent\"; which suggests misuse of her pain medications.  The patient is taking high doses of immediate release oxycodone (70 mg per day), methadone 5 mg TID, klonopin 0.5 mg Q3H but no other muscle relaxants per recent medication list.    Patient's  instructed that pain medications won't help with spasm and that she would likely get better relief with a muscle relaxant than pain medication but  insists she needs more pain medications.   and patient were instructed by her pain management provider to present to the ER for uncontrolled pain to allow for admission to the hospital for control of her pain and spasm.  I also instructed them to discuss admission for pain and spasm control with the ER provider as I would not prescribe any other medications for her at this time.   states he will discuss this with the ER provider.   ended call at this point.    Edin German MD  [facility]    "

## 2017-01-21 NOTE — PROVIDER NOTIFICATION
No orders on pt. What time are you coming to see the pt? Thanks please advise.    Md called will be up in 15mins.     Pt triggered sepsis protocol, lactic came back 0.6. Thank you

## 2017-01-21 NOTE — ED NOTES
Observation Brochure and Video  Pt and family informed of observation status based on provider's order. Observation Brochure was given and video watched.  Meeta Delgado RN

## 2017-01-21 NOTE — PLAN OF CARE
Problem: Goal Outcome Summary  Goal: Goal Outcome Summary  Pt hard to assess, pt yelling out in pain, falls asleep for less than a minute and wakes up yelling.  Pt does not always answer questions, needs lots of encouragement. Rates pain, 9/10, constantly thrashing around, slapping legs due to muscle spasms. With pain and movement, hr tachycardic and BP elevated. Given scheduled metoprolol and losartan.   Given oxycodone, dilaudid, ativan, atarax, valium, baclofen, tylenol, and ibuprofen, nothing has helped much pain or spasms yet.  Pt not eating due to pain, drinking a lot though.  Incontinent of urine- according to pt is at home as well.  A1-2 with pivot transfers in bed and wheelchair.  Redness to  Lateral abdominal folds, barrier cream applied.

## 2017-01-21 NOTE — PROGRESS NOTES
Seen and examined. H&P and old chart reveiwed.  Talked to patient's  over the phone.  Still with severe cramping on bilateral LE.   stated that this not new for our patient and unfortunately she is having more cramping flare up secondary to her MS.   It seems that her outpatient medications are no longer providing relief. Plans of intrathecal pump of baclofen this coming early February.  Will provide trial of baclofen oral and IV ativan.    Bassam

## 2017-01-21 NOTE — ED NOTES
Pt family states pt needs to be seen by a physician. Pt family reports that he spoke to on call pain doctor and was told that pt should be admitted for pain control tonight.

## 2017-01-21 NOTE — PROVIDER NOTIFICATION
Pt pain rating 9/10 still unrelieved with 10mg oxycodone, 50mg atarax and 5mg valium. Can we increase oxycodone? please advise thank You.    Ibuprofen ordered.

## 2017-01-21 NOTE — ED NOTES
Reports exacerbation of chronic bilateral leg pain and spasms, low back pain; states last oxycodone at 1600 today; [facility] pain clinic nurse told her to come to ED.  ABCs intact.

## 2017-01-21 NOTE — PHARMACY-ADMISSION MEDICATION HISTORY
Admission medication history interview status for this patient is complete. See [facility] admission navigator for allergy information, prior to admission medications and immunization status.     Medication history interview source(s):Patient  Medication history resources (including written lists, pill bottles, clinic record):[facility] List  Primary pharmacy:[facility]    Changes made to [facility] medication list:  Added: none  Deleted: Narcan  Changed: Aspirin (from 81mg to 162mg), Klonopin (from 0.25mg to 0.75mg), gabapentin (from QID to QAM)    Actions taken by pharmacist (provider contacted, etc):None     Additional medication history information:When interviewed, patient was suffering from severe pain    Medication reconciliation/reorder completed by provider prior to medication history? No    For patients on insulin therapy: No (Yes/No)  Lantus/levemir/NPH/Mix 70/30 dose:  _____   in AM/PM  or twice daily   Sliding scale Novolog Y/N  If Yes, do you have a baseline novolog pre-meal dose:  ______units with meals   Patients eat three meals a day:   Y/N     Any Barriers to therapy:  cost of medications/comfortable with giving injections (if applicable)/ comfortable and confident with current diabetes regimen       Prior to Admission medications    Medication Sig Last Dose Taking? Auth Provider   clonazePAM (KLONOPIN) 0.5 MG tablet Take 0.5 tablets (0.25 mg) by mouth every 3 hours as needed for anxiety (limit 6 1/2 tabs per day)  Patient taking differently: Take 0.75 mg by mouth every 3 hours as needed for anxiety (limit 6 1/2 tabs per day)  1/20/2017 at night Yes Saba Worley APRN CNP   oxyCODONE (ROXICODONE) 10 MG IR tablet Take 1 to 2 tablet(s) every 4 hours as needed for pain limit 7  tablet(s) per day. 30 day supply . Start 1/19/17, dispense on or after 1/19/17 1/21/2017 at AM Yes Saba Worley APRN CNP   DULoxetine (CYMBALTA) 30 MG capsule 30 mg in am and 60 mg in pm 1/20/2017  at night Yes Julius Hassan MD   amitriptyline (ELAVIL) 100 MG tablet Take 1 tablet (100 mg) by mouth At Bedtime 1/20/2017 at night Yes Julius Hassan MD   losartan-hydrochlorothiazide (HYZAAR) 100-25 MG per tablet Take 1 tablet by mouth daily 1/20/2017 at AM Yes Julius Hassan MD   metoprolol (TOPROL-XL) 50 MG 24 hr tablet Take 1 tablet (50 mg) by mouth daily 1/20/2017 at AM Yes Julius Hassan MD   gabapentin (NEURONTIN) 300 MG capsule Take 1 capsule (300 mg) by mouth 4 times daily  Patient taking differently: Take 300 mg by mouth every morning  1/20/2017 at AM Yes Heidi Mueller,    lidocaine (LIDODERM) 5 % patch Apply 1  patches to low back and right buttock ( cut to fit)  area at once for up to 12 h within a 24 h period.  Remove after 12 hours. 1/21/2017 at AM Yes Saba Worley APRN CNP   glatiramer (COPAXONE) 20 MG/ML injection Inject 1 mL (20 mg) Subcutaneous daily 1/20/2017 at AM Yes Robert Zuluaga PA-C   Cholecalciferol (VITAMIN D PO) Take 1 tablet by mouth daily 1000 IU daily 1/20/2017 at AM Yes Reported, Patient   Multiple Vitamin (MULTI-VITAMIN PO) Take 1 tablet by mouth daily  1/20/2017 at AM Yes Reported, Patient   omega-3 fatty acids (FISH OIL) 1200 MG capsule Take 1 capsule by mouth daily. 1/20/2017 at AM Yes Reported, Patient   aspirin 81 MG tablet Take 1 tablet by mouth daily.  Patient taking differently: Take 162 mg by mouth daily  1/20/2017 at AM Yes Ammon Lees PA-C

## 2017-01-21 NOTE — PLAN OF CARE
Problem: Goal Outcome Summary  Goal: Goal Outcome Summary  PT: order received; Patient under Observation cares for back and leg pain; Unable to tolerate PT evaluation at this time secondary to uncontrolled pain.

## 2017-01-21 NOTE — ED NOTES
Pt declines further repositioning or comfort measures; continues to report pain and spasms.  Family present and asking nurse to address pain.  Provider updated of family concern for immediate intervention.  Provider states she will see patient shortly.

## 2017-01-21 NOTE — ED NOTES
Pt assisted to toilet with heavy assist of 2, voided and back to chair per request; states no relief of pain or spasms yet.  Given ice packs for legs per her request at this time.  Pt appears to have less nonverbal pain cues.

## 2017-01-21 NOTE — ED NOTES
"Discussed medications with pt; pt states \"you might as well give me Advil; I need to get at least 30 mg of oxycodone to make a dent\"; encouraged patient to try this medication, after discussion patient agrees to try this.  Pt insists that provider be told she needs more medications.  Pt assisted to reposition to position of choice in wheelchair with pillows for support.  Pt continues to report pain and spasms.  Provider updated.  "

## 2017-01-21 NOTE — PLAN OF CARE
Problem: Goal Outcome Summary  Goal: Goal Outcome Summary  Outcome: No Change  Pt arrived to the floor by wheelchair at 0215 accompanied by spouse and son. VSS and AO. Pt having severe pain and muscle spasms bilat in LE, but more on the right side. Pt received Lidocaine patch on right thigh, 10mg oxycodone and 5mg valium and 50mg of vistaril and ice packs this shift which has given the pt no relief. [place] with heavy assist of 2 [place] [facility] [place] iv, unable to regain iv access at this time due to pt's spasms. Pt triggered sepsis protocol lactic 0.6 md notified. Md notified about pts continuing pain. Ibuprofen ordered and given. Will continue to monitor.

## 2017-01-21 NOTE — H&P
CHIEF COMPLAINT:  Leg spasms.      HISTORY OF PRESENT ILLNESS:  Amanda Richardson is a 51-year-old female who has a chronic history of back pain, leg pain and leg spasms.  This has been going on for quite some time.  Spasms worsened during the day on 2017, and the patient presented to the emergency room for evaluation of her leg spasms.  Currently continues to have leg spasms, mostly in the right leg, radiates up into her back, and she is having quite a bit of pain with the spasms.  Other than the back pain and the leg spasms, no other complaints at this time.  She does have some chronic problems with constipation due to chronic opiate use, but this does not seem to be any different than usual at this point.  Has not noted any fevers or chills, has not noted any dysuria.      PAST MEDICAL HISTORY:   1.  Previous lymphoma.   2.  Tobacco use disorder.   3.  Chronic pain syndrome.   4.  Multiple sclerosis.   5.  Depression.      ALLERGIES:  Lisinopril, Flexeril.      MEDICATIONS:  Prior to hospitalization, the patient has been takin.  Oxycodone as needed for pain.   2.  Clonazepam 0.25 mg as needed for anxiety.   3.  Cymbalta 30 mg in the morning, 60 mg at night.   4.  Amitriptyline 100 mg a day.   5.  Hyzaar 100/25 mg a day.   6.  Metoprolol Extended Release 50 mg a day.   7.  Gabapentin 300 mg four times a day.   8.  Lidoderm patch once a day.   9.  Copaxone 20 mg subcutaneously a day.   10.  Vitamin D 1000 international units a day.   11.  Multivitamin once a day.   12.  Aspirin 81 mg a day.   13.  Fish oil supplement once a day.      SOCIAL HISTORY:  The patient does smoke a half pack of cigarettes a day.  Does not drink alcohol.      FAMILY HISTORY:  Sister with breast cancer.  No coronary artery disease in the patient's immediate family that she knows of.      REVIEW OF SYSTEMS:  Positive for back pain and leg spasms.  Otherwise, a 10-point review of systems is negative for acute problems.      PHYSICAL  EXAM:   VITAL SIGNS:  Today show a temperature of 98.8, heart rate 93, blood pressure 162/88, respiratory rate 24, oxygen saturation 94%.   GENERAL:  The patient is well-developed, well-nourished, in no acute distress.  Awake, alert and oriented x3.   HEAD:  Normocephalic, atraumatic.   EYES:  Pupils equal, round, reactive to light.  Extraocular muscles intact.  Sclerae are nonicteric.   OROPHARYNX:  Moist mucous membranes.  No lesions.   NECK:  Supple.  No mass.   HEART:  Regular.  No murmurs.   LUNGS:  Clear to auscultation bilaterally.  The patient is using normal respiratory effort to breathe, no accessory muscle use.   ABDOMEN:  Positive bowel sounds.  Soft, nontender to palpation, nondistended.   SKIN:  Warm and dry to touch.  No rash over examined skin.   EXTREMITIES:  No pitting edema in the lower extremities.  There is mild nonpitting edema present in the lower extremities bilaterally.  No cyanosis noted.  The patient does have what appears to be some chronic change from venous stasis bilaterally, with some color changes of the skin that again appear chronic.   NEUROLOGIC:  Cranial nerves II-XII are intact, equal bilaterally.  The patient moves all four extremities.   PSYCHIATRIC:  The patient has an appropriate affect.  Oriented to person, place and time.      LABORATORY TESTING:  Metabolic panel today shows glucose of 102, otherwise unremarkable.  Complete blood count shows a white count of 16.0, otherwise unremarkable.      ASSESSMENT AND PLAN:  A 51-year-old female with muscle spasms.   1.  Muscle spasms:  We will have Valium available to the patient as needed, and hydroxyzine available to the patient as needed to try to help with muscle spasms.   2.  Acute on chronic pain syndrome:  We will have pain medications available to the patient as needed.   3.  Leukocytosis:  The patient does have a history of chronic leukocytosis.  White blood cell count actually appears to be at about the patient's baseline  at this time, but we will check a urinalysis at this time because if the patient did have a urinary tract infection, it could be causing an exacerbation of her back pain and leg spasms.   4.  Hypertension:  Restart the patient on her home doses of metoprolol and Hyzaar.   5.  DVT prophylaxis:  Have the patient up and ambulating for DVT prophylaxis.         ALEC DEMARCO DO             D: 2017 03:30   T: 2017 04:25   MT: ALEX#[id number]      Name:     JIMMY SYLVESTER   MRN:      [id number]        Account:      [id number]   :      1965           Admitted:     [id number]      Document: [id number]       cc: Saba Hassan MD

## 2017-01-21 NOTE — TELEPHONE ENCOUNTER
Call Type: Triage Call    Presenting Problem: 'I am calling to let your know that I am bringing  in my wife; her leg and hip spasms from the MS are out of control;  Her provider, Guillermina said to let you know to call Pain and  Palliative on call provider .  Advised that ED will triage; and call  appropriate provider. Call placed to [facility] ED and information  shared.  Triage Note:  Guideline Title: Information Only Call; No Symptom Triage (Adult)  Recommended Disposition: Provide Information or Advice Only  Original Inclination:  Override Disposition:  Intended Action:  Physician Contacted: No  General information question; no triage required. Information provided from  approved references or knowledge of organization. ?  YES  Requesting regular office appointment ? NO  Sign(s) or symptom(s) associated with a diagnosed condition or with a new illness  ? NO  Requesting information about provider, services or community resources ? NO  Call back to complete assessment/clarification of information from prior caller to  complete triage ? NO  Requesting information and provider is best resource; no triage required. ? NO  Caller not with patient and is unable to provide clinical information about  patient to facilitate triage. ? NO  Requesting provider information for recently scheduled test, procedure; no triage  required. Needed information not available per approved resources or clinical  experience. ? NO  Requesting information not available per approved reference or clinical  experience; no triage required. ? NO  Requesting information regarding scheduled exam, test or procedure; no triage  required. Information provided from approved resources or clinical experience. ?  NO  Health information question; person denies any symptoms, no triage required.  Information provided from approved references or clinical experience. ? NO  Physician Instructions:  Care Advice:

## 2017-01-21 NOTE — ED PROVIDER NOTES
"  History     Chief Complaint:  Bilateral leg pain     HPI   Amanda Richardson is a 51 year old female with a medical history significant for MS who presents with an exacerbation of her chronic bilateral lower leg/back pain and spasms since yesterday. Despite taking 5 mg of Oxycodone today, last Oxycodone was taken at 1600, she has had no improvement in her pain and therefore was advised to come to the emergency department by the nurse at her pain clinic as the pain clinic was not comfortable prescribing her more pain medications over the phone. The patient does not report any numbness, tingling, falls, trauma, loss of bowel/bladder control or other related symptoms. She voices no other concerns at this time.     Allergies:  Lisinopril  Flexeril       Medications:    Klonopin  Roxicodone  Cymbalta   Narcan  Elavil  Toprol  Hyzaar   Neurontin  Lidoderm  Copazone   Aspirin       Past Medical History:    Tobacco abuse   Spinal stenosis   Hodgkin's lymphoma   MS   HTN   Prediabetes      Past Surgical History:    Non hodgkins lymphoma T cell surgery  Lumbar fusion   L4-5 epidural fusion  ORIF right ankle      Family History:    CAD, cancer (Father)   HTN (mother)    Social History:  The patient is a former smoker and quit in 2013. The patient does not use alcohol.   Marital Status:   [2]     Review of Systems   Musculoskeletal: Positive for back pain and arthralgias.   Neurological: Negative for numbness.   All other systems reviewed and are negative.      Physical Exam     Patient Vitals for the past 24 hrs:   BP Temp Temp src Pulse Heart Rate Resp SpO2 Height Weight   01/21/17 0100 158/89 mmHg - - 97 97 20 95 % - -   01/20/17 2334 168/79 mmHg 97.4  F (36.3  C) Temporal 99 99 20 97 % 1.753 m (5' 9\") 108.863 kg (240 lb)          Physical Exam  General: Appears stated age  HENT: Atraumatic.  Eyes: No scleral injection, conjunctivitis, or drainage.    Neck: Supple with normal ROM.  Cardio: Regular rate and rhythm, " no murmurs, rubs, or gallops  Pulmonary/Chest: Clear to ausculation bilaterally.    Musculoskeletal: No pedal edema. PT/DP 2+   Lymph: No anterior or posterior lymphadenopathy.   Neuro: Alert and oriented, no focal deficits noted.   Skin: Normal color and temperature, no rashes to visible exposed skin.   Psych: Mood and affect normal.     Emergency Department Course   Interventions:  0028 Roxicodone 10 mg PO   2357 Roxicodone 10 mg PO   2357 Valium 5 mg PO     Laboratory:   CBC: WNL (WBC 16.0 H, HGB 13.1, )   BMP: WNL (Creatinine 0.44)    Emergency Department Course:  Past medical records, nursing notes, and vitals reviewed. I performed an exam of the patient and obtained history, as documented above.      0034: The patient is demanding for 30 mg of Oxycodone immediately upon my entering the room. I discussed with her options of treating her pain with IV narcotics however patient opted for oral medications and informed patient will start with 10 mg and increase to 30 mg per her request upon assessing pain level patient states she wanted to see a physician who would give her 30 mg right away. After speaking to my partner, Dr. Nazario, he elected to admit the patient for pain control after looking through her chart.     0108 I spoke with Dr. Enriquez of the hospitalist service     Findings and plan explained to the Patient who consents to admission. Discussed the patient with Dr. Enriquez, who will admit the patient to an obs bed for further monitoring, evaluation, and treatment.   Impression & Plan      Medical Decision Making:  Amanda Richardson is a 51 year old female presents with an exacerbation of her chronic bilateral lower leg/back pain and spasms since yesterday. Despite taking 5 mg of Oxycodone today the pain is not well controlled. Patient denies other medical concerns on exposed skin to lower extremities no signs of infection/cellulitis. Patient will be admitted to observation unit. Dr. Nazario  updated patient about the observation status admission.     Diagnosis:    ICD-10-CM    1. Chronic pain of both lower extremities M79.604 Basic metabolic panel    M79.605 CBC with platelets differential    G89.29       Disposition:  The patient was admitted to the hospitalist service     I, Anh Naik, am serving as a scribe at 11:37 PM on 1/20/2017 to document services personally performed by Dewayne Mcdaniel, BAYLEE CNP* based on my observations and the provider's statements to me.              Dewayne Mcdaniel, BAYLEE CNP  01/21/17 0246

## 2017-01-22 PROBLEM — R25.2 SPASM: Status: ACTIVE | Noted: 2017-01-22

## 2017-01-22 LAB
GLUCOSE BLDC GLUCOMTR-MCNC: 132 MG/DL (ref 70–99)
GLUCOSE BLDC GLUCOMTR-MCNC: 136 MG/DL (ref 70–99)
LACTATE BLD-SCNC: 0.7 MMOL/L (ref 0.7–2.1)

## 2017-01-22 PROCEDURE — 25000132 ZZH RX MED GY IP 250 OP 250 PS 637: Performed by: HOSPITALIST

## 2017-01-22 PROCEDURE — 25000132 ZZH RX MED GY IP 250 OP 250 PS 637: Mod: GY | Performed by: INTERNAL MEDICINE

## 2017-01-22 PROCEDURE — 00000146 ZZHCL STATISTIC GLUCOSE BY METER IP

## 2017-01-22 PROCEDURE — A9270 NON-COVERED ITEM OR SERVICE: HCPCS | Performed by: HOSPITALIST

## 2017-01-22 PROCEDURE — A9270 NON-COVERED ITEM OR SERVICE: HCPCS | Mod: GY | Performed by: INTERNAL MEDICINE

## 2017-01-22 PROCEDURE — 25000125 ZZHC RX 250: Performed by: INTERNAL MEDICINE

## 2017-01-22 PROCEDURE — 99232 SBSQ HOSP IP/OBS MODERATE 35: CPT | Performed by: INTERNAL MEDICINE

## 2017-01-22 PROCEDURE — 83605 ASSAY OF LACTIC ACID: CPT | Performed by: INTERNAL MEDICINE

## 2017-01-22 PROCEDURE — G0378 HOSPITAL OBSERVATION PER HR: HCPCS

## 2017-01-22 PROCEDURE — 12000000 ZZH R&B MED SURG/OB

## 2017-01-22 PROCEDURE — 96376 TX/PRO/DX INJ SAME DRUG ADON: CPT

## 2017-01-22 PROCEDURE — 25800025 ZZH RX 258: Performed by: INTERNAL MEDICINE

## 2017-01-22 PROCEDURE — 36415 COLL VENOUS BLD VENIPUNCTURE: CPT | Performed by: INTERNAL MEDICINE

## 2017-01-22 RX ORDER — BACLOFEN 10 MG/1
5 TABLET ORAL 3 TIMES DAILY PRN
Status: DISCONTINUED | OUTPATIENT
Start: 2017-01-22 | End: 2017-01-25 | Stop reason: HOSPADM

## 2017-01-22 RX ORDER — DEXTROSE MONOHYDRATE, SODIUM CHLORIDE, AND POTASSIUM CHLORIDE 50; 1.49; 4.5 G/1000ML; G/1000ML; G/1000ML
INJECTION, SOLUTION INTRAVENOUS CONTINUOUS
Status: DISCONTINUED | OUTPATIENT
Start: 2017-01-22 | End: 2017-01-22

## 2017-01-22 RX ORDER — CIPROFLOXACIN 250 MG/1
250 TABLET, FILM COATED ORAL EVERY 12 HOURS SCHEDULED
Status: DISCONTINUED | OUTPATIENT
Start: 2017-01-22 | End: 2017-01-25 | Stop reason: HOSPADM

## 2017-01-22 RX ORDER — METHYLPREDNISOLONE SODIUM SUCCINATE 125 MG/2ML
125 INJECTION, POWDER, LYOPHILIZED, FOR SOLUTION INTRAMUSCULAR; INTRAVENOUS DAILY
Status: COMPLETED | OUTPATIENT
Start: 2017-01-22 | End: 2017-01-24

## 2017-01-22 RX ORDER — HYDRALAZINE HYDROCHLORIDE 20 MG/ML
10 INJECTION INTRAMUSCULAR; INTRAVENOUS EVERY 6 HOURS PRN
Status: DISCONTINUED | OUTPATIENT
Start: 2017-01-22 | End: 2017-01-25 | Stop reason: HOSPADM

## 2017-01-22 RX ADMIN — LOSARTAN POTASSIUM AND HYDROCHLOROTHIAZIDE 2 TABLET: 50; 12.5 TABLET, FILM COATED ORAL at 08:43

## 2017-01-22 RX ADMIN — IBUPROFEN 600 MG: 600 TABLET ORAL at 16:13

## 2017-01-22 RX ADMIN — CIPROFLOXACIN HYDROCHLORIDE 250 MG: 250 TABLET, FILM COATED ORAL at 08:45

## 2017-01-22 RX ADMIN — ACETAMINOPHEN 650 MG: 325 TABLET, FILM COATED ORAL at 18:14

## 2017-01-22 RX ADMIN — GABAPENTIN 300 MG: 300 CAPSULE ORAL at 21:48

## 2017-01-22 RX ADMIN — HYDROXYZINE HYDROCHLORIDE 50 MG: 25 TABLET ORAL at 18:14

## 2017-01-22 RX ADMIN — ASPIRIN 81 MG: 81 TABLET, COATED ORAL at 08:42

## 2017-01-22 RX ADMIN — LORAZEPAM 1 MG: 2 INJECTION INTRAMUSCULAR; INTRAVENOUS at 02:28

## 2017-01-22 RX ADMIN — POTASSIUM CHLORIDE, DEXTROSE MONOHYDRATE AND SODIUM CHLORIDE: 150; 5; 450 INJECTION, SOLUTION INTRAVENOUS at 13:36

## 2017-01-22 RX ADMIN — AMITRIPTYLINE HYDROCHLORIDE 100 MG: 25 TABLET, FILM COATED ORAL at 21:48

## 2017-01-22 RX ADMIN — METHYLPREDNISOLONE SODIUM SUCCINATE 125 MG: 125 INJECTION, POWDER, FOR SOLUTION INTRAMUSCULAR; INTRAVENOUS at 14:59

## 2017-01-22 RX ADMIN — LORAZEPAM 1 MG: 2 INJECTION INTRAMUSCULAR; INTRAVENOUS at 07:13

## 2017-01-22 RX ADMIN — GABAPENTIN 300 MG: 300 CAPSULE ORAL at 08:42

## 2017-01-22 RX ADMIN — HYDROXYZINE HYDROCHLORIDE 50 MG: 25 TABLET ORAL at 12:19

## 2017-01-22 RX ADMIN — DULOXETINE HYDROCHLORIDE 30 MG: 30 CAPSULE, DELAYED RELEASE ORAL at 08:43

## 2017-01-22 RX ADMIN — LIDOCAINE 1 PATCH: 50 PATCH CUTANEOUS at 08:46

## 2017-01-22 RX ADMIN — DULOXETINE HYDROCHLORIDE 60 MG: 30 CAPSULE, DELAYED RELEASE ORAL at 20:19

## 2017-01-22 RX ADMIN — Medication 5 MG: at 18:40

## 2017-01-22 RX ADMIN — CIPROFLOXACIN HYDROCHLORIDE 250 MG: 250 TABLET, FILM COATED ORAL at 20:19

## 2017-01-22 RX ADMIN — LORAZEPAM 1 MG: 2 INJECTION INTRAMUSCULAR; INTRAVENOUS at 21:50

## 2017-01-22 RX ADMIN — GABAPENTIN 300 MG: 300 CAPSULE ORAL at 18:03

## 2017-01-22 RX ADMIN — Medication 5 MG: at 01:18

## 2017-01-22 RX ADMIN — GABAPENTIN 300 MG: 300 CAPSULE ORAL at 12:19

## 2017-01-22 RX ADMIN — Medication 5 MG: at 21:49

## 2017-01-22 RX ADMIN — ENOXAPARIN SODIUM 40 MG: 40 INJECTION SUBCUTANEOUS at 14:16

## 2017-01-22 RX ADMIN — IBUPROFEN 600 MG: 600 TABLET ORAL at 07:12

## 2017-01-22 RX ADMIN — Medication 5 MG: at 08:43

## 2017-01-22 RX ADMIN — Medication 5 MG: at 16:14

## 2017-01-22 RX ADMIN — OXYCODONE HYDROCHLORIDE 15 MG: 5 TABLET ORAL at 20:23

## 2017-01-22 RX ADMIN — METOPROLOL SUCCINATE 50 MG: 50 TABLET, EXTENDED RELEASE ORAL at 08:42

## 2017-01-22 ASSESSMENT — PAIN DESCRIPTION - DESCRIPTORS: DESCRIPTORS: SPASM

## 2017-01-22 NOTE — PLAN OF CARE
Problem: Goal Outcome Summary  Goal: Goal Outcome Summary    PT: Patient not appropriate for physical therapy evaluation per chart review, RN and Neurology secondary to uncontrolled pain.  Plan is for pain team consult tomorrow; will reschedule evaluation for tomorrow.  Shannan Carr DPT  Pager [phone]

## 2017-01-22 NOTE — PLAN OF CARE
Problem: Goal Outcome Summary  Goal: Goal Outcome Summary  Outcome: No Change  Pt continues to have frequent painful spasms to the right leg causing thrashing and non-logical movements  Given baclofen, atarax, and new steroids this shift  Incontinent at times  No BM this shift

## 2017-01-22 NOTE — PLAN OF CARE
Problem: Goal Outcome Summary  Goal: Goal Outcome Summary  Outcome: No Change  Patient very spastic this shift. Flailing in bed. Crying out in pain. Additional dose of baclofen administered and IV ativan. Patient able to sleep very little. Incontinent of urine. Oxycodone given for pain. Sticky note left for MD regarding pain clinic visits and possible need for neurologist. Vital signs stable, hypertensive at times. UA collected, MD notified of results. IV is SL. Good PO intake. Up to bedside commode with assist of 2-3. Patient refuses to weight bare to right LE.       12:33 AM  Paged Admitting MD  Patient having increased spasms to lower extremity. Can we get PRN baclofen? Other suggestions?      8:50 PM  Paged Admitting MD  Please review UA results. New orders? Thank you!

## 2017-01-22 NOTE — PROGRESS NOTES
[facility]  Hospitalist Progress Note  Miguel Ángel Banegas MD, MD 01/22/2017  (Text Page)  Reason for Stay (Diagnosis): painful spasms         Assessment and Plan:      Summary of Stay: Amanda Richardson is a 51 year old female with known chronic pain syndrome on chronic narcotics, MS with previous episodes of multiple painful spasms, depression, hypertension admitted on 1/20/2017 with increasing leg spasms not controlled by pain medications being given by pain clinic    Problem List:   1. Painful muscle spasms  2. Multiple sclerosis  3. Acute on chronic pain syndrome  4. UTI (hard to elicit if with symptoms)  5. Hypertension    Patient is receiving baclofen tid with prn dosing. Ativan is being provided as well.   stated that our patient can tolerate large amount of oxycodone at home and recently has been prescribed only 7 pills from pain clinic. Due to earlier severity of the pain i've increased it to 15 mg q4h prn and continue to monitor her respiratory pattern.  Formal neurology evaluation given this spastic spells from MS.  mentioned that she had this for quite sometime and sometimes can get flare up like this. Consideration of baclofen pump is being planned in the future.  Pain service evaluation in AM  She is not safe for discharge yet and expecting more than 2 days of hospitalization. Will change to inpatient service.    Addendum:  Appreciate neuro input, recommending high dose steroids trial for at least 3 days. I dont see amisha contraindications for steroids for now.  UTI is being treated with antibiotics.  DVT Prophylaxis: Enoxaprain (Lovenox) SQ  Code Status: Full Code  Discharge Dispo: home  Estimated Disch Date / # of Days until Disch: > 2 days        Interval History (Subjective):      Continuing care today.  Seen and examined with  at bedside.  Amanda still has intermittent painful spasms events.  She is somnolent and lethargic during exam but still exhibited  "intermittent sporadic jerky movement while having spastic spells. Remained afebrile, no nausea/vomiting.  Not a good historian.  Fernando () stated that our patient appears slightly better.                  Physical Exam:      Last Vital Signs:  /108 mmHg  Pulse 93  Temp(Src) 95.9  F (35.5  C) (Axillary)  Resp 24  Ht 1.753 m (5' 9\")  Wt 110.224 kg (243 lb)  BMI 35.87 kg/m2  SpO2 96%  LMP 12/11/2011    I/O last 3 completed shifts:  In: 1430 [P.O.:1430]  Out: 600 [Urine:600]  Wt Readings from Last 1 Encounters:   01/21/17 110.224 kg (243 lb)     Filed Vitals:    01/20/17 2334 01/21/17 0206   Weight: 108.863 kg (240 lb) 110.224 kg (243 lb)       Constitutional: Sleepy, lethargic but not in respiratory distress   Respiratory: Clear to auscultation bilaterally, no crackles or wheezing   Cardiovascular: Regular rate and rhythm, normal S1 and S2, and no murmur noted   Abdomen: Normal bowel sounds, soft, non-distended, non-tender   Skin: No rashes, no cyanosis, dry to touch   Neuro: Somnolent, lethargic, intermittent jerky movement   Extremities: No edema, normal range of motion   Other(s): Euthymic mood, not agitated       All other systems: Negative          Medications:      All current medications were reviewed with changes reflected in problem list.         Data:      All new lab and imaging data was reviewed.   Labs:    Recent Labs  Lab 01/21/17  0120      POTASSIUM 3.5   CHLORIDE 105   CO2 26   ANIONGAP 8   *   BUN 19   CR 0.44*   GFRESTIMATED >90Non  GFR Calc   GFRESTBLACK >90African American GFR Calc   MARY 8.4*       Recent Labs  Lab 01/21/17  0120   WBC 16.0*   HGB 13.1   HCT 40.7   MCV 81          Recent Labs  Lab 01/21/17  1518 01/21/17  0120   GLC  --  102*   BGM 97  --        Recent Labs  Lab 01/21/17  2000   COLOR Light Yellow   APPEARANCE Slightly Cloudy   URINEGLC Negative   URINEBILI Negative   URINEKETONE Negative   SG 1.008   UBLD Small*   URINEPH 7.0 "   PROTEIN 10*   NITRITE Positive*   LEUKEST Trace*   RBCU 3*   WBCU 7*      Imaging:   Results for orders placed or performed during the hospital encounter of 08/16/16   MA Screening Digital Bilateral    Narrative    SCREENING MAMMOGRAM, BILATERAL, DIGITAL w/CAD - 8/16/2016 4:42 PM.    BREAST SYMPTOMS: No current breast complaints.     COMPARISON:  7/25/2013, 7/23/2012.    BREAST DENSITY: Almost entirely fat.    COMMENTS: No findings of suspicion for malignancy.   Tiny partially  circumscribed asymmetry in the mid upper left breast on tomographic  images was present on prior diagnostic mammographic images of  7/31/2012, considered benign.      Impression    IMPRESSION: BI-RADS CATEGORY: 1 -  NEGATIVE.    RECOMMENDED FOLLOW-UP: Annual Mammography    Exam results letter mailed to patient.    OMEGA GILBERT MD

## 2017-01-22 NOTE — CONSULTS
Full note dictated.  Pt with history of MS and significant LE spasticity/painful spasms R>L leg. She has UTI with flare of sx to point that oral pain mgmt is not helping. She is followed by Dr. Musa YOON, pain clinic and plan is for baclofen pump. She is sedated from meds overnight but restless. She could give me history of her treating MDs. She states no steroids in 8 years for MS. She is on Copaxone.    Exam with RLE weakness/spasticity so can't straighten leg. LLE spastic by can fully extend slowly. Strength LLE 5/5. Hyperreflexia BLE and upgoing toe on R. UE seems symmetric and CN with no deficits.     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 I wonder if UTI has triggered inc spasticity. Spoke with hospitalist as UTI mild will do an IV steroid burst to see if this helps spasticity/pain. Solumedrol 125mg a day for 3d then taper. Pain service to [facility] to see if they can move up baclofen pump implant. Continue oral baclofen and consider valium vs klonopin in the hospital but she is getting ativan so this might no make much difference.

## 2017-01-22 NOTE — CONSULTS
NEUROLOGY CONSULTATION       HISTORY OF PRESENT ILLNESS:  Amanda Richardson is a 51-year-old female admitted yesterday for uncontrolled spasticity in the lower extremities.  She has a history of multiple sclerosis on Copaxone and followed by Dr. Levi Vigil at [facility].  She has been having a lot of issues with pain from spasticity, right greater than left leg, prompting her to be followed by the Pain Clinic for management.  Her last visit there was 01/19/2017.  They note that she had a baclofen pump trial in December and was trying oxycodone 10 mg, but it was not helpful, methadone had not been helpful, medical marijuana had not been helpful, Botox injections had been tried in December without help, and so the patient now presented to the hospital as the spasms in the legs were making her unable to sleep.  The Pain Clinic at that visit had recommended that she go up on methadone and keep the oxycodone to where it was.  They tried increasing Klonopin at that time to help her as well.  She states that she has not had any steroids for over 8 years for her MS.  She has had problems with chronic lower extremity symptoms with right greater than left weakness and spasticity.  She does have a UTI on admission here, but no other dramatic infectious etiology.      MEDICATIONS:   1.  Oxycodone 10 mg.    2.  Klonopin 0.25 t.i.d. p.r.n. and 0.5 mg at night.   3.  Cymbalta 30 in the morning, 60 at night.   4.  Amitriptyline 100 at night.   5.  Hyzaar 100/25 a day.   6.  Metoprolol 50 mg a day.   7.  Gabapentin 300 mg q.i.d.   8.  Lidoderm patch daily.   9.  Copaxone subq daily.   10.  Aspirin 81 mg.   11.  Vitamin D, multivitamin and fish oil.      SOCIAL HISTORY:  She is  and lives with her .  She smokes a half pack of cigarettes a day, but denies any alcohol use.      FAMILY HISTORY:  Significant for sister with breast cancer.      REVIEW OF SYSTEMS:  Positive for some  back pain along with the leg spasm.  She denies any real new symptoms.      PHYSICAL EXAMINATION:   VITAL SIGNS:  Today she has been afebrile.  Blood pressure is running around 170/100, pulse of 93.  Height is 5 foot 9 inches, weight 243 pounds.   CARDIOVASCULAR:  Shows a regular rate and rhythm with no cardiac murmurs or carotid bruits.  RESPIRATORY:  Lungs are clear to auscultation.   SKIN:  Shows some venous stasis changes from the mid calf down in both legs and this is very red and there is some slight edema at the ankles bilaterally.   NEUROLOGIC:  She is obviously in pain, moving constantly in bed, grabbing the back of her right leg in particular, lying on her left side seems to give her the most relief.  She is able to answer questions regarding her MS, but is very general about medications.  She can follow 1- and 2-step commands.  Speech is fairly fluent, but she obviously is sleepy from the medications she is receiving.  Cranial nerves show her pupils to be 3 mm and reactive.  She has full extraocular movements with no nystagmus.  Facial strength and sensation appears equal and symmetric and tongue protruded in the midline.  Motor exam in the upper extremities shows no real spasticity, she has good proximal and distal strength, and a slight postural tremor, but is very mild.  In the lower extremities, she keeps herself in a flexed position, but I can slowly straighten her left leg, though there is some spasticity there.  Strength is actually quite good proximally and distally.  The right leg she keeps in a contracted position and I really cannot straighten that.  She does have fairly good hip flexor strength and can dorsiflex the foot as well.  Reflexes are symmetric in the upper extremity, hyperreactive in the lower extremities, greater on the right than the left, with an upgoing toe on the right, equivocal on the left.  She does respond to pain stimulus in both legs.  I could not get her up to ambulate.       IMPRESSION AND PLAN:  Ms. Richardson is suffering from what sounds like chronic progressive multiple sclerosis with paraparesis in the lower extremities, right greater than left, and problems with ongoing spasticity in the right leg that has not been able to be controlled well with appropriate oral medications as noted above.  Given that her urinary tract infection is mild, I spoke with the hospitalist who feels she could tolerate Solu-Medrol at 125 mg intravenous a day for 3 days and see if this might help calm down some of her symptoms.  She will continue on the other oral medicines with the oral baclofen as they have increased when she was admitted.  I talked to him about perhaps switching from Klonopin to Valium as this is a shorter onset of action, but he states she is already getting a lot of Ativan, so that may not be helpful, but he will look at the dosing schedule.  The Pain Service is going to see her tomorrow and hopefully they can move up the baclofen pump procedure or see if they have any other thoughts.  Physical therapy can start to work with her once the stimulation is not triggering more spasm.         JANAE DUNN MD             D: 2017 13:55   T: 2017 14:41   MT: ALEX#[id number]      Name:     JIMMY RICHARDSON   MRN:      [id number]        Account:       [id number]   :      1965           Consult Date:  2017      Document: [id number]

## 2017-01-23 ENCOUNTER — APPOINTMENT (OUTPATIENT)
Dept: PHYSICAL THERAPY | Facility: CLINIC | Age: 52
DRG: 059 | End: 2017-01-23
Attending: INTERNAL MEDICINE
Payer: MEDICARE

## 2017-01-23 LAB
BACTERIA SPEC CULT: ABNORMAL
GLUCOSE BLDC GLUCOMTR-MCNC: 100 MG/DL (ref 70–99)
GLUCOSE BLDC GLUCOMTR-MCNC: 110 MG/DL (ref 70–99)
GLUCOSE BLDC GLUCOMTR-MCNC: 124 MG/DL (ref 70–99)
GLUCOSE BLDC GLUCOMTR-MCNC: 170 MG/DL (ref 70–99)
Lab: ABNORMAL
MICRO REPORT STATUS: ABNORMAL
MICROORGANISM SPEC CULT: ABNORMAL
PLATELET # BLD AUTO: 404 10E9/L (ref 150–450)
SPECIMEN SOURCE: ABNORMAL

## 2017-01-23 PROCEDURE — 99232 SBSQ HOSP IP/OBS MODERATE 35: CPT | Performed by: INTERNAL MEDICINE

## 2017-01-23 PROCEDURE — 40000193 ZZH STATISTIC PT WARD VISIT: Performed by: PHYSICAL THERAPIST

## 2017-01-23 PROCEDURE — 97162 PT EVAL MOD COMPLEX 30 MIN: CPT | Mod: GP | Performed by: PHYSICAL THERAPIST

## 2017-01-23 PROCEDURE — A9270 NON-COVERED ITEM OR SERVICE: HCPCS | Mod: GY | Performed by: PSYCHIATRY & NEUROLOGY

## 2017-01-23 PROCEDURE — A9270 NON-COVERED ITEM OR SERVICE: HCPCS | Mod: GY | Performed by: CLINICAL NURSE SPECIALIST

## 2017-01-23 PROCEDURE — 25000132 ZZH RX MED GY IP 250 OP 250 PS 637: Mod: GY | Performed by: CLINICAL NURSE SPECIALIST

## 2017-01-23 PROCEDURE — A9270 NON-COVERED ITEM OR SERVICE: HCPCS | Mod: GY | Performed by: INTERNAL MEDICINE

## 2017-01-23 PROCEDURE — 25000132 ZZH RX MED GY IP 250 OP 250 PS 637: Mod: GY | Performed by: HOSPITALIST

## 2017-01-23 PROCEDURE — 25000125 ZZHC RX 250: Performed by: INTERNAL MEDICINE

## 2017-01-23 PROCEDURE — 36415 COLL VENOUS BLD VENIPUNCTURE: CPT | Performed by: INTERNAL MEDICINE

## 2017-01-23 PROCEDURE — A9270 NON-COVERED ITEM OR SERVICE: HCPCS | Mod: GY | Performed by: HOSPITALIST

## 2017-01-23 PROCEDURE — 12000000 ZZH R&B MED SURG/OB

## 2017-01-23 PROCEDURE — 25000132 ZZH RX MED GY IP 250 OP 250 PS 637: Mod: GY | Performed by: PSYCHIATRY & NEUROLOGY

## 2017-01-23 PROCEDURE — 97110 THERAPEUTIC EXERCISES: CPT | Mod: GP | Performed by: PHYSICAL THERAPIST

## 2017-01-23 PROCEDURE — 85049 AUTOMATED PLATELET COUNT: CPT | Performed by: INTERNAL MEDICINE

## 2017-01-23 PROCEDURE — 25000132 ZZH RX MED GY IP 250 OP 250 PS 637: Mod: GY | Performed by: INTERNAL MEDICINE

## 2017-01-23 PROCEDURE — 00000146 ZZHCL STATISTIC GLUCOSE BY METER IP

## 2017-01-23 PROCEDURE — 99223 1ST HOSP IP/OBS HIGH 75: CPT | Performed by: CLINICAL NURSE SPECIALIST

## 2017-01-23 RX ORDER — LORAZEPAM 0.5 MG/1
.5-1 TABLET ORAL EVERY 6 HOURS PRN
Status: DISCONTINUED | OUTPATIENT
Start: 2017-01-23 | End: 2017-01-25 | Stop reason: HOSPADM

## 2017-01-23 RX ORDER — OXYCODONE HYDROCHLORIDE 5 MG/1
10 TABLET ORAL EVERY 4 HOURS PRN
Status: DISCONTINUED | OUTPATIENT
Start: 2017-01-23 | End: 2017-01-25 | Stop reason: HOSPADM

## 2017-01-23 RX ORDER — GABAPENTIN 300 MG/1
600 CAPSULE ORAL 3 TIMES DAILY
Status: DISCONTINUED | OUTPATIENT
Start: 2017-01-23 | End: 2017-01-25 | Stop reason: HOSPADM

## 2017-01-23 RX ORDER — BACLOFEN 10 MG/1
10 TABLET ORAL 3 TIMES DAILY
Status: DISCONTINUED | OUTPATIENT
Start: 2017-01-23 | End: 2017-01-25 | Stop reason: HOSPADM

## 2017-01-23 RX ORDER — LORAZEPAM 2 MG/ML
.5-1 INJECTION INTRAMUSCULAR EVERY 6 HOURS PRN
Status: DISCONTINUED | OUTPATIENT
Start: 2017-01-23 | End: 2017-01-25 | Stop reason: HOSPADM

## 2017-01-23 RX ORDER — LORAZEPAM 2 MG/ML
1 INJECTION INTRAMUSCULAR EVERY 6 HOURS PRN
Status: DISCONTINUED | OUTPATIENT
Start: 2017-01-23 | End: 2017-01-23

## 2017-01-23 RX ADMIN — CIPROFLOXACIN HYDROCHLORIDE 250 MG: 250 TABLET, FILM COATED ORAL at 20:22

## 2017-01-23 RX ADMIN — OXYCODONE HYDROCHLORIDE 10 MG: 5 TABLET ORAL at 23:53

## 2017-01-23 RX ADMIN — IBUPROFEN 600 MG: 600 TABLET ORAL at 06:05

## 2017-01-23 RX ADMIN — ASPIRIN 81 MG: 81 TABLET, COATED ORAL at 08:23

## 2017-01-23 RX ADMIN — Medication 5 MG: at 20:26

## 2017-01-23 RX ADMIN — METHYLPREDNISOLONE SODIUM SUCCINATE 125 MG: 125 INJECTION, POWDER, FOR SOLUTION INTRAMUSCULAR; INTRAVENOUS at 09:46

## 2017-01-23 RX ADMIN — Medication 5 MG: at 02:47

## 2017-01-23 RX ADMIN — BACLOFEN 10 MG: 10 TABLET ORAL at 22:09

## 2017-01-23 RX ADMIN — HYDROXYZINE HYDROCHLORIDE 50 MG: 25 TABLET ORAL at 02:46

## 2017-01-23 RX ADMIN — LIDOCAINE 1 PATCH: 50 PATCH CUTANEOUS at 08:21

## 2017-01-23 RX ADMIN — LOSARTAN POTASSIUM AND HYDROCHLOROTHIAZIDE 2 TABLET: 50; 12.5 TABLET, FILM COATED ORAL at 08:22

## 2017-01-23 RX ADMIN — AMITRIPTYLINE HYDROCHLORIDE 100 MG: 25 TABLET, FILM COATED ORAL at 22:08

## 2017-01-23 RX ADMIN — OXYCODONE HYDROCHLORIDE 15 MG: 5 TABLET ORAL at 04:43

## 2017-01-23 RX ADMIN — LORAZEPAM 1 MG: 2 INJECTION INTRAMUSCULAR; INTRAVENOUS at 04:44

## 2017-01-23 RX ADMIN — DULOXETINE HYDROCHLORIDE 30 MG: 30 CAPSULE, DELAYED RELEASE ORAL at 08:22

## 2017-01-23 RX ADMIN — LORAZEPAM 1 MG: 0.5 TABLET ORAL at 13:30

## 2017-01-23 RX ADMIN — ENOXAPARIN SODIUM 40 MG: 40 INJECTION SUBCUTANEOUS at 13:33

## 2017-01-23 RX ADMIN — GABAPENTIN 300 MG: 300 CAPSULE ORAL at 13:33

## 2017-01-23 RX ADMIN — DULOXETINE HYDROCHLORIDE 60 MG: 30 CAPSULE, DELAYED RELEASE ORAL at 20:22

## 2017-01-23 RX ADMIN — OXYCODONE HYDROCHLORIDE 10 MG: 5 TABLET ORAL at 15:02

## 2017-01-23 RX ADMIN — METOPROLOL SUCCINATE 50 MG: 50 TABLET, EXTENDED RELEASE ORAL at 08:23

## 2017-01-23 RX ADMIN — SENNOSIDES AND DOCUSATE SODIUM 1 TABLET: 8.6; 5 TABLET ORAL at 13:30

## 2017-01-23 RX ADMIN — CIPROFLOXACIN HYDROCHLORIDE 250 MG: 250 TABLET, FILM COATED ORAL at 08:23

## 2017-01-23 RX ADMIN — ACETAMINOPHEN 650 MG: 325 TABLET, FILM COATED ORAL at 23:53

## 2017-01-23 RX ADMIN — OXYCODONE HYDROCHLORIDE 10 MG: 5 TABLET ORAL at 19:10

## 2017-01-23 RX ADMIN — GABAPENTIN 300 MG: 300 CAPSULE ORAL at 08:23

## 2017-01-23 RX ADMIN — Medication 5 MG: at 08:24

## 2017-01-23 RX ADMIN — Medication 5 MG: at 15:50

## 2017-01-23 RX ADMIN — GABAPENTIN 600 MG: 300 CAPSULE ORAL at 22:08

## 2017-01-23 RX ADMIN — Medication 5 MG: at 13:29

## 2017-01-23 ASSESSMENT — PAIN DESCRIPTION - DESCRIPTORS
DESCRIPTORS: SPASM
DESCRIPTORS: ACHING;SHARP
DESCRIPTORS: SPASM
DESCRIPTORS: SPASM

## 2017-01-23 NOTE — PROGRESS NOTES
[facility]  Pain Service Consultation     Date of Admission:  1/20/2017  Date of Consult (When I saw the patient): 01/23/2017    Assessment and Plan  Amanda Richardson is a 51 year old female who was admitted on 1/20/2017 through the ED for uncontrolled pain and muscle spasms.    1) Acute pain flare of chronic pain/spasm.  Unclear if exacerbated by UTI and inflammatory process. Also discussioned with [facility] NP who has been concerned for metabolic myoclonus related to chronic use of opioids.        2)  Patient with chronic low back pain, bilateral leg pain and spasms, R greater than L, on chronic opioid therapy managed by Saba Worley CNP.  [place]: Shows consistent prescribing of opioids and controlled substances by 2 providers in same clinic. Patient with appropriate refill patterns consistent with care plan. Noted increase in oxycodone tablet numbers consistent with increasing max dose ordered by provider.  45 mg Daily Morphine Equivalent increased to 75 mg Daily Morphine Equivalent on 1/19/2017.  Patient has a chronic opioid tolerance.     Patient's opioid use thus far:   In the last 24 hours   Oxycodone 30mg   = 45 mg Daily Morphine Equivalent    3)  Opioid induced side-effects:  -Constipation   Last bowel movement was Friday 1/20/2017. Pt reports it is usually resolved with stool softeners.  -Sedation  Likely not due to opioids. On multiple new sedating medications.       4) Other/Related:    -Depression/anxiety  -Deconditioning    PLAN:   1)Continue with treatment of UTI with cipro. Continue with day 2 of 3 day course of solumedrol for potential inflammatory cause of increased pain.  2) Ask neurology for their opinion regarding patient risk for metabolic myoclonus  related to opioids that could be contributory.Will not increase oxycodone dosage.  3) Called Dr. Cerda regarding upcoming baclofen pain pump trial scheduled for 2/28/17 to see if it can be  expedited. He will not be available until tomorrow.    4)Multimodal Medication Therapy  Topical:  Lidoderm 5% patch daily    NSAIDS:  Ibuprofen 600 mg q 6 hours prn    Muscle Relaxants:   Baclofen 5mg PO three times per day scheduled  Baclofen 5mg PO three times per day prn    Adjuvants:  DC Hydroxyzine  Gabapentin 300mg PO 4 times per day    Other medications:  -Acetaminophen 650mg PO PRN    Antidepresants/anxiolytics:  Cymbalta EC 30mg PO q am and 60mg PO q pm  Amitriptyline 100mg PO HS for insomnia  Lorazepam 0.5-1mg IV every 6 hours prn muscle spasms      Opioids:   Oxycodone 10 mg q 4 hours prn.    4)Non-medication interventions  Tens Unit if patient's family is able to bring it from home, otherwise will check with PT to see if they can supply one for patient while here.  Ice or Heat to painful areas per patient comfort, being careful to avoid lidoderm patch. Patient reminded of this.    5)Constipation Prophylaxis  As ordered.    6) DC safety  - Will reinforce education on naloxone use with patient prior to discharge . Will also ensure patient continues with storage plan for controlled substances to minimize risk for access by son living with patient.    Time Spent on This Encounter  I spent  45 minutes is assessment of the patient and discussion with the patient and family.  Another 45 minutes in review of chart, documentation and discussion with the health care team.    Armida BEACH, CNS  Pain Management and Palliative Care  [facility]  Pgr: [phone]      Reason for Consult    Amanda Richardson is a 51 year old female who was admitted on 1/20/2017. I was asked to see the patient by Dr. Banegas of the Hospitalist Service for uncontrolled pain and muscle spasms    .Primary Care Physician  Primary Care Physician:Julius Hassan, [facility]  Pain Specialist: Saba Worley, CNP, [facility]  Neurology: Dr. Vigil, [facility]      Chief  "Complaint    Uncontrolled pain and muscle spasms.   History is obtained from the patient, medical records and conversation with Saba Worley NP who follows patient ongoing at [facility].    History of Present Illness  Amanda Richardson is a 51 year old female who presents with acute pain exacerbation of chronic pain.  Patient was diagnosed with MS over 10 years ago. Over time has developed worsening frequency and intensity of muscle spasms of lower extremities. Also has spinal stenosis and lumbar radiculopathy for which she has had a lumbar fusion, and epidural injection. Patient is followed for ongoing management of her pain and spasms at the [facility] and was seen in the clinic last Friday where the Pain Nurse Practitioner that follows her made adjustments to her pain plan including increasing her maximum dose of oxycodone 10 mg tabs to 5 tabs per day, and addition klonazepam 0.25mg to help with her worsing spasms.   Patient is scheduled to see Dr. Griffin at [facility] on 2/28/17 for an epidural baclofen pain pump trial.  Patient has ongoing care by Dr. Vigil of neurology to manage her MS.    Patient denies sedation at home. Notes she has been sleepy since she has been hospitalized. Hospitalist decreased frequency of IV lorazepam from q 4 hours to q 6 hours this am, and decreased oxycodone dose from 15mg to 10mg due to lethargy this am. Patient is more alert, and able to sit up at the side of the bed during my interview    CURRENT PAIN:  Her pain is located in the lumbar back, radiating down right leg. Most painful over right femur where she has a lidoderm patch currently in place.   The pain  is described as Burning and Stabbing, tender.  She rates it as ranging between 5/10 and 9/10  Currently it is rated as 5/10  It improves with ativan, \"maybe\" baclofen, lidoderm patch, lying on left side; Uses ice and heat at home at times.  It worsens by lying on back, lying on right " "side, sudden movements.  She feels that the ibuprofen, acetaminophen are not helping.  She was able to get up to the bedside commode with assist of 1-2 this am and denies any additional discomfort.  She has been compliant with the recommendations while in the hospital.    Patient has intermittent spasm down both legs. Right leg spasms are much worse than left. Patient has limited mobility of Right leg. She reports bilateral decreased sensation around feet and ankles, worse on the right.   It is described as Burning, Sharp and Stabbing.  She rates it as ranging between 5/10 and 9/10  Currently it is rated as 5/10  It improves by taking ativan, \"maybe\" baclofen, lying on left side; has heating pad and fan at bedside. Uses ice, heat, and TENS Unit prn at home.  It worsens by sudden position changes.  She has been compliant with the recommendations while in the hospital.    PAIN HISTORY:  The pain is associated with spinal stenosis, lumbar radiculopathy, MS and right hip bursitis piriformis syndrome. She has a chronic low back pain with bilateral radicular feature L2-3 on right and L3-4 on left. S/P Anterior spinal fusion, L4-L5. Insertion of intervertebral biomechanical device, L4-L5 (PEEK device). Anterior lumbar instrumentation, L4-L5, with screw fixation and anterior cover plate. [place] of local autograft bone fro5%m bony endplates.Posterior lumbar fusion, L4-L5 (bilateral intertransverse technique) Posterolateral fusion, L5-S1 (bilateral intertransverse to sacral ala technique). Bilateral decompression, L4-L5.L5-S1. Segmental pedicle screw instrumentation, L4-S1. Bilateral pelvic screw instrumentation with connection to [id number] screws. She has had an epidural injection.  Currently, the pain is mainly located in the lumbar spine, radiating down legs, especially right. She is having ongoing worsening spasms in her legs, with the right leg being much worse than the left leg. She has decreased ROM especially on the " Right. She was seen in the pain clinic on Friday 1/19 for worsening spasms. Plan at that time was to add klonazepam for spasms, continue with oxycodone but increase maximum number of tabs to 5 per day, continue with gabapentin, lidoderm patches, TENs, ice, and walking. Patient was tapered off methadone because it was not felt to be helping.  called clinic later that day due to issues with filling prescription for oxycodone. Max number of doses patient can take was increased to 5 so she did not have enough tabs with current script, but it was too early for a new script so Insurance would not authorize. Pain Clinic NP wrote script and increased oxycodone 10mg tabs to max of 7 tabs or 70 mg/day.   called on call pain provider just after midnight on 1/21/2017 reporting that he was in the ED at [facility] with his wife and the increased pain medication was not controlling her pain and she needed more muscle relaxant.    Pain is described as Aching, Burning and Stabbing  Rates it as ranging between 5/10 and 9/10  Currently it is rated as 5/10  It improves by taking baclofen,     It worsens at night, if she over does it, if she is inactive.  To be compliant with the recommendations from the clinic. She needs to make a follow up appointment with Saba Worley CNP at the pain clinic before Saba will write additional opioid scripts. Patient also to follow up with neurologist regarding reassessment of current MS management plan.    PAST PAIN TREATMENT:   Medications:    Methadone, Hydrocodone,Fentanyl, Lea, Tramadol, Ibuprofen, Naproxen, Flexeril, Tizanidine, Cymbalta, Lyrica, Morphine  ER, Flexeril, Baclofen    Non-phamacologic modalities:  Medical Marijuana  Tens Unit  Physical Therapy  Heat and cold applications  Counseling    Previous interventions/surgeries:  Botox injections  Anterior Posterior lumbar fusion  Epidural injection    DIRE score in pain clinic on 1/19/2017 was 15.  (14 or higher  predicts good candidate for ongoing opioid management; 13 or lower predicts poor candidate for opioid management)   Daniel Corado 2006,The Journal of  Pain.,The DIRE Score: Predicting Outcomes of Opioid Prescribing for Chronic Pain Vol.7,No.9, pp 671-681.    Past Medical History  I have reviewed this patient's medical history and updated it with pertinent information if needed.   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     Chronic pain      [facility]     Colon polyps 1/15     tubular adenomas x 2     Moderate depressive episode (H)      Prediabetes          Past Surgical History  I have reviewed this patient's surgical history and updated it with pertinent information if needed.    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 Taking?   ASPIRIN PO 1/20/2017 at a.m  Yes Yes   Sig: Take 162 mg by mouth daily   CLONAZEPAM PO 1/20/2017 at night  Yes Yes   Sig: Take 0.75 mg by mouth every 3 hours as needed for anxiety   Cholecalciferol (VITAMIN D PO) 1/20/2017 at AM  Yes Yes   Sig: Take 1 tablet by mouth daily 1000 IU daily   DULOXETINE HCL PO 1/20/2017 at Unknown time  Yes Yes   Sig: Take 30 mg by mouth every morning   DULOXETINE HCL PO 1/20/2017 at pm  Yes Yes   Sig: Take 60 mg by mouth every evening   GABAPENTIN PO 1/20/2017 at a.m  Yes Yes   Sig: Take 300 mg by mouth every morning   Multiple Vitamin (MULTI-VITAMIN PO) 1/20/2017 at AM  Yes Yes   Sig: Take 1 tablet by mouth daily    amitriptyline (ELAVIL) 100 MG tablet 1/20/2017 at night  No Yes   Sig: Take 1 tablet (100 mg) by mouth At Bedtime   glatiramer (COPAXONE) 20 MG/ML injection 1/20/2017 at AM  No Yes   Sig: Inject 1 mL (20 mg) Subcutaneous daily   lidocaine (LIDODERM) 5 % patch 1/21/2017 at AM  No Yes   Sig: Apply 1  patches to low back and right buttock ( cut to fit)  area at once for up to 12 h within a 24 h period.  Remove after 12 hours.   losartan-hydrochlorothiazide (HYZAAR) 100-25 MG per tablet 1/20/2017 at AM  No Yes   Sig: Take 1 tablet by mouth daily   metoprolol (TOPROL-XL) 50 MG 24 hr tablet 1/20/2017 at AM  No Yes   Sig: Take 1 tablet (50 mg) by mouth daily   omega-3 fatty acids (FISH OIL) 1200 MG capsule 1/20/2017 at AM  Yes Yes   Sig: Take 1 capsule by mouth daily.   oxyCODONE (ROXICODONE) 10 MG IR tablet 1/21/2017 at AM  No Yes   Sig: Take 1 to 2 tablet(s) every 4 hours as needed for pain limit 7  tablet(s) per day. 30 day supply . Start 1/19/17, dispense on or after 1/19/17      Facility-Administered Medications: None     Allergies  Allergies   Allergen Reactions     Lisinopril      Lip swelling     Flexeril [Cyclobenzaprine Hcl]      Got confused        Social History   Patient is   to  Fernando. They have 3 grown children. 2 sons live with them in their home. Patient smokes 1/2 ppd.    Family History  Pain Clinic Records indicate patient's son with history of chemical dependency, currrently sober. Patient reports that she carries her controlled medications with her at all times as part of her opioid safety plan.    Review of Systems  The 10 point Review of Systems is negative other than noted in the HPI or here.     Physical Exam  Temp:  [96.7  F (35.9  C)-99.6  F (37.6  C)] 96.7  F (35.9  C)  Heart Rate:  [] 98  Resp:  [20-22] 20  BP: (132-191)/(75-97) 169/89 mmHg  SpO2:  [94 %-97 %] 97 %  243 lbs 0 oz  GEN:  Alert, oriented x 3, cooperative. Distressed and guarded when spasms become severe.  HEENT:  Normocephalic/atraumatic, no scleral icterus, no nasal discharge, mouth moist.  CV:  RRR, S1, S2; no murmurs or other irregularities noted.  +3 DP/PT pulses bilatererally; no edema BLE.  RESP:  Clear to auscultation bilaterally without rales/rhonchi/wheezing/retractions.  Symmetric chest rise on inhalation noted.  Normal respiratory effort.  ABD:  Rounded, soft, non-tender/non-distended.  +BS  EXT:  Trace nonpitting edema Right LE. DP and PT pulses +2/+2.   Feet pale, warm to touch.   M/S:   Tender to palpation over lower lumbar, right hip and femur.    SKIN:  Dry to touch, Right LE reddened with scaly skin.   NEURO: Symmetric strength +5/5 Upper extremities. +2 RLE/+5LLE.  Decreased sensation to touch in LEs from ankles to toes, worse on RLE.   There is no area of allodynia or hyperesthesia.  PAIN BEHAVIOR: Cooperative. Guarding, especially RLE when spasms present.  Psych:  Normal affect.  Calm, cooperative, conversant appropriately.       Data  Results for orders placed or performed during the hospital encounter of 01/20/17 (from the past 24 hour(s))   Glucose by meter   Result Value Ref Range    Glucose 132 (H) 70 - 99 mg/dL   Glucose by meter   Result Value Ref Range     Glucose 136 (H) 70 - 99 mg/dL   Glucose by meter   Result Value Ref Range    Glucose 110 (H) 70 - 99 mg/dL   Glucose by meter   Result Value Ref Range    Glucose 100 (H) 70 - 99 mg/dL

## 2017-01-23 NOTE — PLAN OF CARE
Problem: Goal Outcome Summary  Goal: Goal Outcome Summary  Outcome: Improving  Pt alert and oriented. Pivots with 2 and a walker. Voiding adequately. Incontinent. Baclofen, atarax, oxy, and ativan given for pain tonight. Pt slept majority of night, with minimal bouts of severe pain.

## 2017-01-23 NOTE — PROGRESS NOTES
Patient continues to have severe spasm in the right lower extremity.  She had transient relief with IV solumedrol.  She has h/o MS.  I will recommend increasing the gabapentin dose to 600 mg TID and increase Baclofen to 10 mg TID.

## 2017-01-23 NOTE — PROGRESS NOTES
[facility]  Hospitalist Progress Note  Miguel Ángel Banegas MD, MD 01/23/2017  (Text Page)  Reason for Stay (Diagnosis): painful spasms         Assessment and Plan:      Summary of Stay: Amanda Richardson is a 51 year old female with known chronic pain syndrome on chronic narcotics, MS with previous episodes of multiple painful spasms, depression, hypertension admitted on 1/20/2017 with increasing leg spasms not controlled by pain medications being given by pain clinic    Problem List:   1. Painful muscle spasms felt secondary to chronic progressive MS  2. Multiple sclerosis  3. Acute on chronic pain syndrome  4. UTI (hard to elicit if with symptoms)  5. Hypertension    Patient is receiving baclofen tid with prn dosing. Ativan is being provided as well. Currently she appears to be much improved. Will defer further adjustments of her narcotics to pain service as she is also being followed with pain clinic as outpatient.   I've decreased the frequency and dosage of ativan and oxycodone as currently she is mostly sleeping and has less reported issues with painful spastic movement.  Will continue with IV medrol for 3 days total as per recommendations of neurologist for this progressive MS   stated that our patient can tolerate large amount of oxycodone at home and recently has been prescribed only 7 pills from pain clinic. Consideration of baclofen pump is being planned in the future.  UTI is being treated with antibiotics.    DVT Prophylaxis: Enoxaprain (Lovenox) SQ  Code Status: Full Code  Discharge Dispo: home  Estimated Disch Date / # of Days until Disch: > 1-2 days        Interval History (Subjective):      Continuing care today.  Seen and examined.  Amanda appears to be much better today as she is sleeping well with no observed intermittent arousal and jerky/spastic movement.  She slept well last night as well and was not requiring a lot of narcotics and ativan.  Reportedly she is tolerating  "oral diet.             Physical Exam:      Last Vital Signs:  /79 mmHg  Pulse 93  Temp(Src) 96.7  F (35.9  C) (Oral)  Resp 20  Ht 1.753 m (5' 9\")  Wt 110.224 kg (243 lb)  BMI 35.87 kg/m2  SpO2 97%  LMP 12/11/2011    I/O last 3 completed shifts:  In: 1100 [P.O.:1100]  Out: 1900 [Urine:1900]  Wt Readings from Last 1 Encounters:   01/21/17 110.224 kg (243 lb)     Filed Vitals:    01/20/17 2334 01/21/17 0206   Weight: 108.863 kg (240 lb) 110.224 kg (243 lb)       Constitutional: Sleepy, not in respiratory distress   Respiratory: Clear to auscultation bilaterally, no crackles or wheezing   Cardiovascular: Regular rate and rhythm, normal S1 and S2, and no murmur noted   Abdomen: Normal bowel sounds, soft, non-distended, non-tender   Skin: No rashes, no cyanosis, dry to touch   Neuro: Somnolent, lethargic, intermittent jerky movement   Extremities: No edema, normal range of motion   Other(s): Euthymic mood, not agitated       All other systems: Negative          Medications:      All current medications were reviewed with changes reflected in problem list.         Data:      All new lab and imaging data was reviewed.   Labs:    Recent Labs  Lab 01/21/17  0120      POTASSIUM 3.5   CHLORIDE 105   CO2 26   ANIONGAP 8   *   BUN 19   CR 0.44*   GFRESTIMATED >90Non  GFR Calc   GFRESTBLACK >90African American GFR Calc   MARY 8.4*       Recent Labs  Lab 01/21/17  0120   WBC 16.0*   HGB 13.1   HCT 40.7   MCV 81          Recent Labs  Lab 01/23/17  1129 01/23/17  0243 01/22/17  2156 01/22/17  1803 01/21/17  1518 01/21/17  0120   GLC  --   --   --   --   --  102*   * 110* 136* 132* 97  --        Recent Labs  Lab 01/21/17  2000   COLOR Light Yellow   APPEARANCE Slightly Cloudy   URINEGLC Negative   URINEBILI Negative   URINEKETONE Negative   SG 1.008   UBLD Small*   URINEPH 7.0   PROTEIN 10*   NITRITE Positive*   LEUKEST Trace*   RBCU 3*   WBCU 7*      Imaging:   Results for " orders placed or performed during the hospital encounter of 08/16/16   MA Screening Digital Bilateral    Narrative    SCREENING MAMMOGRAM, BILATERAL, DIGITAL w/CAD - 8/16/2016 4:42 PM.    BREAST SYMPTOMS: No current breast complaints.     COMPARISON:  7/25/2013, 7/23/2012.    BREAST DENSITY: Almost entirely fat.    COMMENTS: No findings of suspicion for malignancy.   Tiny partially  circumscribed asymmetry in the mid upper left breast on tomographic  images was present on prior diagnostic mammographic images of  7/31/2012, considered benign.      Impression    IMPRESSION: BI-RADS CATEGORY: 1 -  NEGATIVE.    RECOMMENDED FOLLOW-UP: Annual Mammography    Exam results letter mailed to patient.    OMEGA GILBERT MD

## 2017-01-23 NOTE — PLAN OF CARE
Problem: Goal Outcome Summary  Goal: Goal Outcome Summary  Outcome: No Change  Ambulatory Status:  Pt up A x 2 to BSC- difficulty with transfer d/t severe muscle spasms to RLE.  Pivot/W/C bound @ baseline.  Very lethargic this AM- would arouse to stimuli but fall immediately back to sleep.  Alert this afternoon.  VS:  AFVSS.  On RA- placed on  d/t increased lethargy.  Pain:  3/10 this AM when lethargic this AM; up to 8/10 in low back/RLE- prn Baclofen and ativan given in addition to scheduled medications.  Resp: LS clear.  GI:  Denies nausea.  Fair appetite and on regular diet.  BS hypoactive.  Passing flatus.  Very large, hard-form BM today- given senna-s 1 tablet..  :  Voiding without difficulty- some incontinence.  Skin:  Scattered bruising/redness on skin.  BLE glenn/thick.  Tx:  P.O. cipro (UTI).  Consults:  Neurology, pain, and PT  Disposition:  TBD- need pain management optimized.

## 2017-01-23 NOTE — PLAN OF CARE
Problem: Goal Outcome Summary  Goal: Goal Outcome Summary  Outcome: Improving  Patient remains hospitalized for R leg spasms and pain control in setting of MS. Spasms somewhat controlled tonight with scheduled and PRN Baclofen, IV Ativan x1, atarax, Tylenol, ibuprofen. Reports improvement of pain but spasms unchanged. Pain team consult for tomorrow. Initiated on high-dose steroids today per Neuro. Blood sugar checks initiated - in 130s tonight. Tolerating regular diet. Continent of urine this shift with good UOP. VSS - though BP remains elevated, however patient unable to stay still long enough to get good reading. Transferring with heavy assist of 2 to pivot to commode - patient reports mobility is similar to her baseline. PT following.

## 2017-01-23 NOTE — PROGRESS NOTES
"[facility]    Patient Name: Amanda Richardson  [id number]  Services received on: 1/23/2017    MEDICAL MUSIC THERAPY PROGRESS NOTE  INITIAL ASSESSMENT    Referral made by: Montserrat Rutledge RN  Referred for: Pain    Session interventions & outcomes: Pt sitting on edge of bed when approached by therapist for music tx services. At onset of session, pt appeared in pain aeb facial wincing, leaning over, eyes closed tightly, pt reported frequent muscle spasms, and vocalized groans due to pain. Therapist provided music-assisted relaxation intervention and pt preferred music (as per request \"Blackbird\" by the Alesasndro Rosario and [employer], \"Star\" by Kylie Sanabria) to redirect attention from perceived pain. Pt demonstrated engagement during music listening aeb faced sound source and rocked to the beat of the music and contributed preferred music songs; and appeared to experience a decrease in pain and leg spasms during and post music intervention aeb pt's eyes open, relaxed facial affect, ceased pt reports of muscle spasms and absence of vocalized groans. Therapist shared observations of pt reduced leg spasms with pt and provided self-strategies to reduce perceived level of pain (create playlist with preferred music via [employer] or [employer] list and rock to music). Pt smiled and thanked therapist. Post intervention, pt reported that the music \"does help!\"    Follow up: Therapist will follow up with pt on Friday if pt remains hospitalized. Therapist onsite on [address] MS on Mondays ([phone]) and Fridays ([phone]).    Kellie Anaya MA, MT-BC  Medical Music Therapy Services  [email or web address]        "

## 2017-01-23 NOTE — PROGRESS NOTES
01/23/17 1500   Quick Adds   Type of Visit Initial PT Evaluation   Living Environment   Lives With spouse;child(heidi), adult   Living Arrangements house   Number of Stairs to Enter Home 2   Number of Stairs Within Home 7   Living Environment Comment pt states her son and  will move her up and down the stairs in a wheelchair   Self-Care   Usual Activity Tolerance poor   Current Activity Tolerance poor   Regular Exercise yes   Activity/Exercise Type (ROM/stretches)   Equipment Currently Used at Home wheelchair   Functional Level Prior   Ambulation 4-->completely dependent   Transferring 0-->independent   Toileting 1-->assistive equipment   Bathing 1-->assistive equipment   Dressing 0-->independent   Eating 0-->independent   Communication 0-->understands/communicates without difficulty   Swallowing 0-->swallows foods/liquids without difficulty   Cognition 0 - no cognition issues reported   Fall history within last six months no   Prior Functional Level Comment pt is mainly homebound   General Information   Onset of Illness/Injury or Date of Surgery - Date 01/20/17   Referring Physician Dr. Simons   Patient/Family Goals Statement return home   Pertinent History of Current Problem (include personal factors and/or comorbidities that impact the POC) Amanda Richardson is a 51 year old female with known chronic pain syndrome on chronic narcotics, MS with previous episodes of multiple painful spasms, depression, hypertension admitted on 1/20/2017 with increasing leg spasms not controlled by pain medications being given by pain clinic   Precautions/Limitations fall precautions   General Observations pt agreeable to PT, obviously in pain and discomfort   Cognitive Status Examination   Orientation orientation to person, place and time   Level of Consciousness alert   Follows Commands and Answers Questions 100% of the time   Personal Safety and Judgment intact   Memory intact   Pain Assessment   Patient Currently in  Pain Yes, see Vital Sign flowsheet  (7-8/10, in R upper thigh, low back pain)   Integumentary/Edema   Integumentary/Edema Comments pt with speckled red areas on L lateral lower leg, 2 spots on back   Posture    Posture Forward head position   Range of Motion (ROM)   ROM Comment R LE ROM limited by spasms, unable to get R hip straighter than 45 degrees of hip flexion.    Strength   Strength Comments weakness in R LE reported, unable to fully assess due to spasms   Bed Mobility   Bed Mobility Comments SBA with increased time for supine to sit.     Transfer Skills   Transfer Comments pt able to transfer bed to chair with squat pivot transfer using arms of chair to assist, SBA.     Gait   Gait Comments pt reports being non-ambulatory   Balance   Balance Comments not formally assessed.  Sitting balance good   Muscle Tone   Muscle Tone Comments hypertonic in R hip flexor with palpable muscle spasms   General Therapy Interventions   Planned Therapy Interventions ROM;transfer training;manual therapy   Clinical Impression   Criteria for Skilled Therapeutic Intervention yes, treatment indicated   PT Diagnosis impaired activity tolerance, muscle spasms, pain   Influenced by the following impairments pain, muscle spasms, weakness   Functional limitations due to impairments impaired transfers and decreased activity tolerance   Clinical Presentation Unstable/Unpredictable   Clinical Presentation Rationale pt with h/o MS, chronic pain, limited by pain, increased muscle tone, functional weakness, very unpredictable as to when spasms will occur leading to increased fall risk   Clinical Decision Making (Complexity) Moderate complexity   Therapy Frequency` 5 times/week   Predicted Duration of Therapy Intervention (days/wks) 4 days   Anticipated Discharge Disposition Home with Assist   Risk & Benefits of therapy have been explained Yes   Patient, Family & other staff in agreement with plan of care Yes   Clinical Impression Comments pt  "is supposed to have Baclofen pump trial at end of February   [facility] AM-PAC TM \"6 Clicks\"   2016, Trustees of [facility], under license to [employer].  All rights reserved.   6 Clicks Short Forms Basic Mobility Inpatient Short Form   [facility] AM-PAC  \"6 Clicks\" V.2 Basic Mobility Inpatient Short Form   1. Turning from your back to your side while in a flat bed without using bedrails? 4 - None   2. Moving from lying on your back to sitting on the side of a flat bed without using bedrails? 3 - A Little   3. Moving to and from a bed to a chair (including a wheelchair)? 3 - A Little   4. Standing up from a chair using your arms (e.g., wheelchair, or bedside chair)? 2 - A Lot   5. To walk in hospital room? 1 - Total   6. Climbing 3-5 steps with a railing? 1 - Total   Basic Mobility Raw Score (Score out of 24.Lower scores equate to lower levels of function) 14   Total Evaluation Time   Total Evaluation Time (Minutes) 8     "

## 2017-01-23 NOTE — PLAN OF CARE
Problem: Goal Outcome Summary  Goal: Goal Outcome Summary    PT- Eval completed.  Pt with h/o chronic pain and debilitating muscle spasms in her R leg.  Pt lives with  and 2 adult sons.  Pt has stairs to enter and exit home and she reports her family lifts her up and down the stairs in her w/c.  Pt with limited activity tolerance at baseline, stays in her manual w/c most days, transfers herself independently.  Pt has tried TENS in the past with minimal success.  Pt has an appointment at the end of February for assessment for Baclofen pump.  Currently, pt continues to have severe muscle spasms in her R hip flexor/quad.  Pt unable to tolerate ROM due to spasms.  Pt was able to transfer with SBA for bed mobility and to squat pivot to chair.  Needs increased time to allow spasms to decrease.  Pt states this is similar to her baseline.  Anticipate pt will return home with support from her family.

## 2017-01-24 ENCOUNTER — TELEPHONE (OUTPATIENT)
Dept: PALLIATIVE MEDICINE | Facility: CLINIC | Age: 52
End: 2017-01-24

## 2017-01-24 ENCOUNTER — APPOINTMENT (OUTPATIENT)
Dept: PHYSICAL THERAPY | Facility: CLINIC | Age: 52
DRG: 059 | End: 2017-01-24
Payer: MEDICARE

## 2017-01-24 LAB — GLUCOSE BLDC GLUCOMTR-MCNC: 95 MG/DL (ref 70–99)

## 2017-01-24 PROCEDURE — 00000146 ZZHCL STATISTIC GLUCOSE BY METER IP

## 2017-01-24 PROCEDURE — 97110 THERAPEUTIC EXERCISES: CPT | Mod: GP

## 2017-01-24 PROCEDURE — 99232 SBSQ HOSP IP/OBS MODERATE 35: CPT | Performed by: INTERNAL MEDICINE

## 2017-01-24 PROCEDURE — A9270 NON-COVERED ITEM OR SERVICE: HCPCS | Mod: GY | Performed by: INTERNAL MEDICINE

## 2017-01-24 PROCEDURE — 40000193 ZZH STATISTIC PT WARD VISIT

## 2017-01-24 PROCEDURE — 12000000 ZZH R&B MED SURG/OB

## 2017-01-24 PROCEDURE — 25000125 ZZHC RX 250: Performed by: INTERNAL MEDICINE

## 2017-01-24 PROCEDURE — 25000132 ZZH RX MED GY IP 250 OP 250 PS 637: Mod: GY | Performed by: PSYCHIATRY & NEUROLOGY

## 2017-01-24 PROCEDURE — 97530 THERAPEUTIC ACTIVITIES: CPT | Mod: GP

## 2017-01-24 PROCEDURE — 99233 SBSQ HOSP IP/OBS HIGH 50: CPT | Performed by: CLINICAL NURSE SPECIALIST

## 2017-01-24 PROCEDURE — A9270 NON-COVERED ITEM OR SERVICE: HCPCS | Mod: GY | Performed by: HOSPITALIST

## 2017-01-24 PROCEDURE — 25000132 ZZH RX MED GY IP 250 OP 250 PS 637: Mod: GY | Performed by: CLINICAL NURSE SPECIALIST

## 2017-01-24 PROCEDURE — 25000132 ZZH RX MED GY IP 250 OP 250 PS 637: Mod: GY | Performed by: HOSPITALIST

## 2017-01-24 PROCEDURE — A9270 NON-COVERED ITEM OR SERVICE: HCPCS | Mod: GY | Performed by: PSYCHIATRY & NEUROLOGY

## 2017-01-24 PROCEDURE — 25000132 ZZH RX MED GY IP 250 OP 250 PS 637: Mod: GY | Performed by: INTERNAL MEDICINE

## 2017-01-24 PROCEDURE — A9270 NON-COVERED ITEM OR SERVICE: HCPCS | Mod: GY | Performed by: CLINICAL NURSE SPECIALIST

## 2017-01-24 RX ORDER — LORAZEPAM 0.5 MG/1
0.5 TABLET ORAL AT BEDTIME
Status: DISCONTINUED | OUTPATIENT
Start: 2017-01-24 | End: 2017-01-25 | Stop reason: HOSPADM

## 2017-01-24 RX ORDER — LORAZEPAM 0.5 MG/1
.5-1 TABLET ORAL EVERY 6 HOURS PRN
Status: DISCONTINUED | OUTPATIENT
Start: 2017-01-24 | End: 2017-01-24

## 2017-01-24 RX ORDER — LORAZEPAM 2 MG/ML
.5-1 INJECTION INTRAMUSCULAR EVERY 6 HOURS PRN
Status: DISCONTINUED | OUTPATIENT
Start: 2017-01-24 | End: 2017-01-24

## 2017-01-24 RX ADMIN — GABAPENTIN 600 MG: 300 CAPSULE ORAL at 16:23

## 2017-01-24 RX ADMIN — IBUPROFEN 600 MG: 600 TABLET ORAL at 18:02

## 2017-01-24 RX ADMIN — ENOXAPARIN SODIUM 40 MG: 40 INJECTION SUBCUTANEOUS at 14:50

## 2017-01-24 RX ADMIN — BACLOFEN 10 MG: 10 TABLET ORAL at 22:21

## 2017-01-24 RX ADMIN — OXYCODONE HYDROCHLORIDE 10 MG: 5 TABLET ORAL at 11:30

## 2017-01-24 RX ADMIN — CIPROFLOXACIN HYDROCHLORIDE 250 MG: 250 TABLET, FILM COATED ORAL at 20:12

## 2017-01-24 RX ADMIN — Medication 5 MG: at 00:13

## 2017-01-24 RX ADMIN — DULOXETINE HYDROCHLORIDE 60 MG: 30 CAPSULE, DELAYED RELEASE ORAL at 20:12

## 2017-01-24 RX ADMIN — OXYCODONE HYDROCHLORIDE 10 MG: 5 TABLET ORAL at 22:28

## 2017-01-24 RX ADMIN — CIPROFLOXACIN HYDROCHLORIDE 250 MG: 250 TABLET, FILM COATED ORAL at 09:31

## 2017-01-24 RX ADMIN — LORAZEPAM 1 MG: 0.5 TABLET ORAL at 23:45

## 2017-01-24 RX ADMIN — METHYLPREDNISOLONE SODIUM SUCCINATE 125 MG: 125 INJECTION, POWDER, FOR SOLUTION INTRAMUSCULAR; INTRAVENOUS at 09:54

## 2017-01-24 RX ADMIN — LORAZEPAM 1 MG: 0.5 TABLET ORAL at 09:32

## 2017-01-24 RX ADMIN — GABAPENTIN 600 MG: 300 CAPSULE ORAL at 09:29

## 2017-01-24 RX ADMIN — OXYCODONE HYDROCHLORIDE 10 MG: 5 TABLET ORAL at 18:02

## 2017-01-24 RX ADMIN — AMITRIPTYLINE HYDROCHLORIDE 100 MG: 25 TABLET, FILM COATED ORAL at 22:20

## 2017-01-24 RX ADMIN — LORAZEPAM 0.5 MG: 0.5 TABLET ORAL at 22:20

## 2017-01-24 RX ADMIN — Medication 5 MG: at 23:45

## 2017-01-24 RX ADMIN — BACLOFEN 10 MG: 10 TABLET ORAL at 16:24

## 2017-01-24 RX ADMIN — BACLOFEN 10 MG: 10 TABLET ORAL at 09:31

## 2017-01-24 RX ADMIN — Medication 5 MG: at 12:30

## 2017-01-24 RX ADMIN — SENNOSIDES AND DOCUSATE SODIUM 2 TABLET: 8.6; 5 TABLET ORAL at 09:54

## 2017-01-24 RX ADMIN — LORAZEPAM 1 MG: 0.5 TABLET ORAL at 16:23

## 2017-01-24 RX ADMIN — DULOXETINE HYDROCHLORIDE 30 MG: 30 CAPSULE, DELAYED RELEASE ORAL at 09:32

## 2017-01-24 RX ADMIN — IBUPROFEN 600 MG: 600 TABLET ORAL at 11:30

## 2017-01-24 RX ADMIN — LIDOCAINE 1 PATCH: 50 PATCH CUTANEOUS at 09:53

## 2017-01-24 RX ADMIN — GABAPENTIN 600 MG: 300 CAPSULE ORAL at 22:21

## 2017-01-24 RX ADMIN — METOPROLOL SUCCINATE 50 MG: 50 TABLET, EXTENDED RELEASE ORAL at 09:30

## 2017-01-24 RX ADMIN — ASPIRIN 81 MG: 81 TABLET, COATED ORAL at 09:30

## 2017-01-24 RX ADMIN — LOSARTAN POTASSIUM AND HYDROCHLOROTHIAZIDE 2 TABLET: 50; 12.5 TABLET, FILM COATED ORAL at 09:29

## 2017-01-24 ASSESSMENT — PAIN DESCRIPTION - DESCRIPTORS
DESCRIPTORS: CRAMPING
DESCRIPTORS: CRAMPING;STABBING;RADIATING

## 2017-01-24 NOTE — PROGRESS NOTES
SPIRITUAL HEALTH SERVICES Progress Note  [facility] Med. Surg. 5    Attempted to visit pt Amanda per her length of stay and referral from pain team for emotional support.  Donita was sleeping upon first attempt and was receiving cares on second attempts.  Will refer pt to unit  for follow up tomorrow.    Reginaldo Doty M.Div., Psychiatric  Staff   Pager [phone]

## 2017-01-24 NOTE — TELEPHONE ENCOUNTER
PA submitted through [employer] via Cover My Meds returned with error message: Patient not found. Patient's insurance card scanned in on 1/11/17 is for [employer]. Will need to contact patient for verification.   [facility]

## 2017-01-24 NOTE — PLAN OF CARE
Problem: Goal Outcome Summary  Goal: Goal Outcome Summary  Outcome: Improving  Ambulatory Status:  Pt up A x 1 with walker to [facility] [place] [place].  VS:  /54- scheduled BP meds given.  AFVSS.  On RA.    Pain:  4-8/10 in right lower back/buttock and right leg- did have some spasms this afternoon- scheduled baclofen and neurontin given, given oxycodone and ibuprofen with no improvement, and given prn baclofen with improvement.  Heat therapy used.  Resp: LS diminished.  GI:  Denies nausea.  Good appetite and on regular diet.  BS hypoactive.  Passing flatus. Last BM 1/23- senna 2 tabs given x 1.  :  Voiding without difficulty- occasional incontinence.  Skin:  Scattered bruising/redness.  BLE glenn/red and dry.  Tx:  P.O. Cipro (UTI)  Labs:  BG 95  Consults:  Pain, neuro, PT, SW  Disposition:  Possible d/c home tomorrow if pain better controlled.

## 2017-01-24 NOTE — DISCHARGE INSTRUCTIONS
Opioid Medication Information    You have been given a prescription for an opioid (narcotic) pain medicine and/or have received a pain medicine. These medicines can make you drowsy or impaired. You must not drive, operate dangerous equipment, or engage in any other dangerous activities while taking these medications. If you drive while taking these medications, you could be arrested for DUI, or driving under the influence. Do not drink any alcohol while you are taking these medications.   Opioid pain medications can cause addiction. If you have a history of chemical dependency of any type, you are at a higher risk of becoming addicted to pain medications.  Only take these prescribed medications to treat your pain when all other options have been tried. Take it for as short a time and as few doses as possible. Store your pain pills in a secure place, as they are frequently stolen and provide a dangerous opportunity for children or visitors in your house to start abusing these powerful medications. We will not replace any lost or stolen medicine.  As soon as your pain is better, you should seek out a drug take back program (see your local police department) to dispose of them.   Over-the-counter medications and prescription drugs can pollute mosley and be harmful to humans, fish, and other wildlife when disposed of improperly -- do not flush medications down the toilet or place in the trash.  Properly disposing of medicines is important to prevent abuse or poisoning and protect the environment.     Prescription and over-the-counter medications are collected anonymously from residents for free at [facility] drop-off locations. Visit the [facility]'s Office Prescription Drug Drop-Off page for the list of drop-off sites and information about the program.       [place]  Police Department [phone] Mon-Fri  8am to 4:30pm     [place]  Police Department [phone] 24hrs a day    [place]  Police  Department [phone] Mon-Fri  8am to 6:00pm     [place]  Police Department [phone] Mon-Fri  8am to 4:30pm     [place]  Police Department [phone] 24hrs a day      [place]  Police Department [phone] Mon-Fri  8am to 4:30pm   Many prescription pain medications contain Tylenol  (acetaminophen), including Vicodin , Tylenol #3 , Norco , Lortab , and Percocet .  You should not take any extra pills of Tylenol  if you are using these prescription medications or you can get very sick.  Do not ever take more than 3000 mg of acetaminophen in any 24 hour period.  All opioids tend to cause constipation. Drink plenty of water and eat foods that have a lot of fiber, such as fruits, vegetables, prune juice, apple juice and high fiber cereal.  Take a laxative if you don t move your bowels at least every other day. Miralax , Milk of Magnesia, Colace , or Senna  can be used to keep you regular.  You will likely need to continue stool softeners and stimulants while taking opioids.     Stop taking Clonazepam. Destroy filled prescription at home.    Maximum dose of oxycodone 10mg tabs is 5 per day.

## 2017-01-24 NOTE — PLAN OF CARE
Problem: Goal Outcome Summary  Goal: Goal Outcome Summary  Outcome: Improving  Cared for patient from [id number]. Patient remains hospitalized for R leg spasms and pain control in setting of MS. Spasms somewhat controlled tonight with scheduled and PRN Baclofen and gabapentin. Neuro increased both Baclofen and gabapentin doses tonight. Pain team following. Continues on high-dose steroids today per Neuro. Blood sugar checks initiated - in 170s tonight. Tolerating regular diet. Continent of urine this shift with good UOP. VSS - BPs improved. Transferring with heavy assist of 2 to pivot to commode - patient reports mobility is similar to her baseline. PT following.

## 2017-01-24 NOTE — PLAN OF CARE
Problem: Goal Outcome Summary  Goal: Goal Outcome Summary  VSS, afebrile, tachycardic at times.  AAOx4.  Tolerates diet, denies nausea. Up with assist x2 and walker. Incontinent at times.  Evening BG-124.  Reports spasm pain to RLE 8/10, scheduled baclofen given, PRN baclofen given x1, PRN oxycodone given x1 with some relief. Also using heating pad. PIV SL.  PO cipro for UTI. Lovenox.  CHANG LE redness, no drainage, CMS intact, no pain.  LS- diminished/clear.   Continue with POC.      VSS, afebrile.  Reports spasm pain to RLE 8/10, PRN tylenol given x1, PRN oxycodone given x1 with some relief.  No changes overnight. AAOx4.. One episode of urinary incontinence with complete bed change.  PIV SL.  Continue with POC.

## 2017-01-24 NOTE — TELEPHONE ENCOUNTER
Appreciate on call coverage for this patient .  I have been in contact with the Pain and Palliative Care Consult team at [facility]. Closing encounter   Saba Worley CNP    [facility]

## 2017-01-24 NOTE — PROGRESS NOTES
[facility]  Pain Management Progress Note    Date of Service (when I saw the patient): 01/24/2017     Assessment and Plan    Amanda Richardson is a 51 year old female who was admitted on 1/20/2017 through the ED for uncontrolled pain and muscle spasms.    1) Acute pain flare of chronic pain/spasm.  Unclear if exacerbated by UTI and inflammatory process. Also discussed case with [facility] NP who has been concerned for metabolic myoclonus related to chronic use of opioids.         2)  Patient with chronic low back pain, bilateral leg pain and spasms, R greater than L, on chronic opioid therapy managed by Saba Worley CNP.  [facility] database review: Shows consistent prescribing of opioids and controlled substances by 2 providers in same clinic. Patient with appropriate refill patterns consistent with care plan. Noted increase in oxycodone tablet numbers consistent with increasing max dose ordered by provider.  45 mg Daily Morphine Equivalent increased to 75 mg Daily Morphine Equivalent on 1/19/2017.  Patient has a chronic opioid tolerance.     Patient's opioid use thus far:    In the last 24 hours    Oxycodone 30mg   = 45 mg Daily Morphine Equivalent  No change from 1/22/17.    3)  Opioid induced side-effects:  -Constipation    Last bowel movement 1/23/2017.  -Sedation  Improved with dc of hydroxyzine and decreased dose of oxycodone.        4) Other/Related:    -Depression/anxiety  -Deconditioning    PLAN:   1)Continue with treatment of UTI with cipro. Continue with day 3 of 3 day course of solumedrol for potential inflammatory cause of increased pain.  2) Ask neurology for their opinion regarding patient risk for metabolic myoclonus related to opioids that could be contributory.Will not increase oxycodone dosage.  3) Continue with baclofen pain pump trial as scheduled for 2/28/17. Unable to be expedited.    4)Multimodal Medication Therapy  Topical:  Lidoderm 5% patch  daily    NSAIDS:  Ibuprofen 600 mg q 6 hours prn    Muscle Relaxants:    Baclofen 10mg PO three times per day scheduled. Increased by neurologist on 1/23.  Baclofen 5mg PO three times per day prn    Adjuvants:  DC Hydroxyzine  Gabapentin 600mg PO three times per day. Increased by neurologist on 1/23.    Other medications:  -Acetaminophen 650mg PO PRN    Antidepresants/anxiolytics:  Cymbalta EC 30mg PO q am and 60mg PO q pm  Amitriptyline 100mg PO HS for insomnia  Lorazepam 0.5-1mg IV or PO every 6 hours prn muscle spasms  - will add a scheduled dose of lorazepam at HS in addition to prn dosing.      Opioids:    Oxycodone 10 mg q 4 hours prn.    4)Non-medication interventions  Tens Unit if patient's family is able to bring it from home. Pt did not want TENS to be supplied from PT today.  Ice or Heat to painful areas per patient comfort, being careful to avoid lidoderm patch. Patient reminded of this.   visit later today.    5)Constipation Prophylaxis  As ordered. Pt requests to keep PRN.    6) DC safety  - Patient verbalizes compliance with opioid medication security at home and understanding on naloxone use for self and family at discharge.  -Do not recommend prescribing opioids at discharge. Patient has script from 1/20/17 for oxycodone from her primary prescriber, Saba Worley NP from the [facility].      Armida BEACH, CNS  Pain Management and Palliative Care  [facility]  Pgr: [phone]    Time Spent on This Encounter  I spent  25 minutes is assessment of the patient and discussion with the patient and family.  Another 15 minutes in review of chart, documentation and discussion with the health care team.      Interval History   Patient more alert today. States she slept last night. Calm and cooperative. Intermittent spasms during interview interrupt our conversation. Patient sat up at side of bed by herself. States the pain and spasm are more tolerable when  she changes position, sits up, moves. Pain scores 6-8/10. Up with 1 and walker to [facility].  to bring in tens unit.  Note neurology increase in gabapentin and baclofen last evening. She stated her pain is worse in the evening and that is her usual pattern, so needed to take oxycodone for 3 doses last evening.   Using lidoderm, and heat prn. Reminded that lorazepam is also available if needed for spasms. Called Dr. Cerda's office at [phone]. After speaking to provider, his nurse Jolene called back to say that they were not able to move up the patient's baclofen pain trial on 2/28/17 at [facility].    Patient denies psychosocial stressors or need for other supports. Open to  visit later today.    Reviewed narcotic safety plan and naloxone safety plan with patient. She feels comfortable with naloxone information she and her family have received and does not desire additional information at this time.    Review of Systems   C: NEGATIVE for fever, chills, change in weight  E/M: NEGATIVE for ear, mouth and throat problems  R: NEGATIVE for significant cough or SOB  RESP:NEGATIVE for significant cough or SOB.  CV: NEGATIVE for chest pain, palpitations or dizziness.  GI: NEGATIVE for nausea, abdominal pain, heartburn, or change in bowel habits, constipation and nausea  MUSCULOSKELETAL:  POSITIVE  for back pain, edema and muscle spasm as noted in physical exam.    Physical Exam  Temp:  [96.4  F (35.8  C)-98  F (36.7  C)] 98  F (36.7  C)  Heart Rate:  [] 102  Resp:  [17-18] 18  BP: (127-160)/(66-89) 160/89 mmHg  SpO2:  [95 %-98 %] 96 %  243 lbs 0 oz  GEN:  Alert, oriented x 3, has intermittent Rleg spasms that cause her to grimace and guard.  HEENT:  Normocephalic/atraumatic, no scleral icterus, no nasal discharge, mouth dry.  CV:  Regular, tachycardic,no murmurs or other irregularities noted.  +3 DP/PT pulses bilatererally; trace non-pitting pedal edema bilat.  RESP:  Clear to auscultation  bilaterally without rales/rhonchi/wheezing/retractions.  Symmetric chest rise on inhalation noted.  Normal respiratory effort.  ABD:  Rounded, soft, non-tender/non-distended.  +BS  EXT:  Edema & pulses as noted above.  CMS intact x 3. RLE with extreme weakness, but is able to lift and touch left leg with heal. Denies numbness or tingling in feet today.  M/S:   Tender to palpation lumbar/sacral spine, Right Hip and Trochanter. Pt rubs right thigh when lying in bed.  SKIN:  Dry to touch, no exanthems noted in the visualized areas except for reddened, flakey skin on lower legs and feet.  NEURO: Symmetric strength +5/5 Upper Extremities. +2 RLE/+5LLE. Sensation to touch intact all extremities.   There is no area of allodynia or hyperesthesia.  PAIN BEHAVIOR: Cooperative  Psych:  Normal affect.  Calm, cooperative, conversant appropriately.    Medications   -    gabapentin  600 mg Oral TID     baclofen  10 mg Oral TID     ciprofloxacin  250 mg Oral Q12H JOSE     enoxaparin  40 mg Subcutaneous Q24H     methylPREDNISolone  125 mg Intravenous Daily     amitriptyline  100 mg Oral At Bedtime     aspirin EC  81 mg Oral Daily     lidocaine  1 patch Transdermal Q24H     metoprolol  50 mg Oral Daily     losartan-hydrochlorothiazide  2 tablet Oral Daily     lidocaine   Transdermal Q24h     lidocaine   Transdermal Q8H     DULoxetine (CYMBALTA) EC capsule 30 mg  30 mg Oral QAM     DULoxetine (CYMBALTA) EC capsule 60 mg  60 mg Oral QPM       Data  Results for orders placed or performed during the hospital encounter of 01/20/17 (from the past 24 hour(s))   Glucose by meter   Result Value Ref Range    Glucose 170 (H) 70 - 99 mg/dL   Glucose by meter   Result Value Ref Range    Glucose 124 (H) 70 - 99 mg/dL   Platelet count   Result Value Ref Range    Platelet Count 404 150 - 450 10e9/L   Glucose by meter   Result Value Ref Range    Glucose 95 70 - 99 mg/dL

## 2017-01-24 NOTE — PROGRESS NOTES
"[facility]  Hospitalist Progress Note  Lyric Brandon MD 01/24/2017    Reason for Stay (Diagnosis): painful spasms         Assessment and Plan:      Summary of Stay: Amanda Richardson is a 51 year old female with known chronic pain syndrome on chronic narcotics, MS with previous episodes of multiple painful spasms, depression, hypertension admitted on 1/20/2017 with increasing leg spasms not controlled by home medications, working with a pain clinic as outpatient     Problem List:   1. Painful muscle spasms felt secondary to chronic progressive MS  2. Multiple sclerosis  3. Acute on chronic pain syndrome  4. UTI (hard to elicit if with symptoms)  5. Hypertension  6. Microscopic hematuria - discussed with patient that she should have repeat UA as outpatient once UTI is adequately treated. If it persists, then she will need to discuss with PCP and consider referral to urology, she verbalized understanding     Currently she appears to be improving with increased dose of baclofen and gabapentin   Will defer further adjustments of her narcotics to pain service   Completed 3 doses of IV solu-medrol as per recommendations of neurologist for this progressive MS, stop today   Consideration of baclofen pump is being planned in the future.  UTI is being treated with antibiotics, sensitive to cipro  Awake, alert today, no excessive somnolence     DVT Prophylaxis: Enoxaprain (Lovenox) SQ  Code Status: Full Code  Discharge Dispo: home  Estimated Disch Date / # of Days until Disch: 1 day, anticipate home tomorrow if symptom control at reasonable level         Interval History (Subjective):      Seen and examined. Chart reviewed, discussed with nursing staff.     Amanda appears to be doing well, pain control is improving, tolerating PO, no excessive sedation, alert, no cp, sob, dysuria.     Last Vital Signs:  /54 mmHg  Pulse 83  Temp(Src) 97.7  F (36.5  C) (Axillary)  Resp 18  Ht 1.753 m (5' 9\")  Wt " 110.224 kg (243 lb)  BMI 35.87 kg/m2  SpO2 96%  LMP 12/11/2011    I/O last 3 completed shifts:  In: 560 [P.O.:560]  Out: 1400 [Urine:1400]  Wt Readings from Last 1 Encounters:   01/21/17 110.224 kg (243 lb)     Filed Vitals:    01/20/17 2334 01/21/17 0206   Weight: 108.863 kg (240 lb) 110.224 kg (243 lb)       Constitutional: awake, not in respiratory distress   Respiratory: Clear to auscultation bilaterally, no crackles or wheezing   Cardiovascular: Regular rate and rhythm, normal S1 and S2, and no murmur noted   Abdomen: Normal bowel sounds, soft, non-distended, non-tender   Skin: No rashes, no cyanosis, dry to touch   Neuro: Awake, alert, sitting up in bed, chronic weakness in legs    Extremities: No edema, normal range of motion   Other(s): Euthymic mood, not agitated       All other systems: Negative          Medications:      All current medications were reviewed with changes reflected in problem list.         Data:      All new lab and imaging data was reviewed.   Labs:    Recent Labs  Lab 01/21/17  0120      POTASSIUM 3.5   CHLORIDE 105   CO2 26   ANIONGAP 8   *   BUN 19   CR 0.44*   GFRESTIMATED >90Non  GFR Calc   GFRESTBLACK >90African American GFR Calc   MARY 8.4*       Recent Labs  Lab 01/23/17  2349 01/21/17  0120   WBC  --  16.0*   HGB  --  13.1   HCT  --  40.7   MCV  --  81    325       Recent Labs  Lab 01/24/17  1012 01/23/17  2211 01/23/17  1719 01/23/17  1129 01/23/17  0243  01/21/17  0120   GLC  --   --   --   --   --   --  102*   BGM 95 124* 170* 100* 110*  < >  --    < > = values in this interval not displayed.    Recent Labs  Lab 01/21/17 2000   COLOR Light Yellow   APPEARANCE Slightly Cloudy   URINEGLC Negative   URINEBILI Negative   URINEKETONE Negative   SG 1.008   UBLD Small*   URINEPH 7.0   PROTEIN 10*   NITRITE Positive*   LEUKEST Trace*   RBCU 3*   WBCU 7*      Imaging:   Imaging results, if any, from last 24 hours have been reviewed

## 2017-01-24 NOTE — TELEPHONE ENCOUNTER
Received fax from [facility] that states Rx for oxyCODONE (ROXICODONE) 10 MG IR tablet is not covered under patient's insurance  Not for current Rx, but will need PA for next fill.    Insurance Name: Julio C  Phone#: [phone]  ID#: [id number]    PA sent via Cover My Meds. Awaiting response.       Haven Washburn [facility] [place]

## 2017-01-25 ENCOUNTER — TELEPHONE (OUTPATIENT)
Dept: PALLIATIVE MEDICINE | Facility: CLINIC | Age: 52
End: 2017-01-25

## 2017-01-25 ENCOUNTER — APPOINTMENT (OUTPATIENT)
Dept: PHYSICAL THERAPY | Facility: CLINIC | Age: 52
DRG: 059 | End: 2017-01-25
Payer: MEDICARE

## 2017-01-25 VITALS
RESPIRATION RATE: 16 BRPM | OXYGEN SATURATION: 93 % | SYSTOLIC BLOOD PRESSURE: 120 MMHG | DIASTOLIC BLOOD PRESSURE: 42 MMHG | WEIGHT: 243 LBS | TEMPERATURE: 97.8 F | HEART RATE: 83 BPM | HEIGHT: 69 IN | BODY MASS INDEX: 35.99 KG/M2

## 2017-01-25 PROCEDURE — 25000132 ZZH RX MED GY IP 250 OP 250 PS 637: Mod: GY | Performed by: INTERNAL MEDICINE

## 2017-01-25 PROCEDURE — 25000132 ZZH RX MED GY IP 250 OP 250 PS 637: Mod: GY | Performed by: HOSPITALIST

## 2017-01-25 PROCEDURE — A9270 NON-COVERED ITEM OR SERVICE: HCPCS | Mod: GY | Performed by: INTERNAL MEDICINE

## 2017-01-25 PROCEDURE — 99239 HOSP IP/OBS DSCHRG MGMT >30: CPT | Performed by: INTERNAL MEDICINE

## 2017-01-25 PROCEDURE — 97110 THERAPEUTIC EXERCISES: CPT | Mod: GP

## 2017-01-25 PROCEDURE — A9270 NON-COVERED ITEM OR SERVICE: HCPCS | Mod: GY | Performed by: CLINICAL NURSE SPECIALIST

## 2017-01-25 PROCEDURE — A9270 NON-COVERED ITEM OR SERVICE: HCPCS | Mod: GY | Performed by: PSYCHIATRY & NEUROLOGY

## 2017-01-25 PROCEDURE — 25000132 ZZH RX MED GY IP 250 OP 250 PS 637: Mod: GY | Performed by: PSYCHIATRY & NEUROLOGY

## 2017-01-25 PROCEDURE — 40000193 ZZH STATISTIC PT WARD VISIT

## 2017-01-25 PROCEDURE — 99233 SBSQ HOSP IP/OBS HIGH 50: CPT | Performed by: CLINICAL NURSE SPECIALIST

## 2017-01-25 PROCEDURE — A9270 NON-COVERED ITEM OR SERVICE: HCPCS | Mod: GY | Performed by: HOSPITALIST

## 2017-01-25 PROCEDURE — 25000132 ZZH RX MED GY IP 250 OP 250 PS 637: Mod: GY | Performed by: CLINICAL NURSE SPECIALIST

## 2017-01-25 PROCEDURE — 25000125 ZZHC RX 250: Performed by: INTERNAL MEDICINE

## 2017-01-25 RX ORDER — LORAZEPAM 0.5 MG/1
TABLET ORAL
Qty: 42 TABLET | Refills: 0 | Status: SHIPPED | OUTPATIENT
Start: 2017-01-25 | End: 2017-03-03

## 2017-01-25 RX ORDER — BACLOFEN 10 MG/1
10 TABLET ORAL 3 TIMES DAILY
Qty: 90 TABLET | Refills: 0 | Status: SHIPPED | OUTPATIENT
Start: 2017-01-25 | End: 2017-02-16

## 2017-01-25 RX ORDER — CIPROFLOXACIN 250 MG/1
250 TABLET, FILM COATED ORAL 2 TIMES DAILY
Qty: 7 TABLET | Refills: 0 | Status: SHIPPED | OUTPATIENT
Start: 2017-01-25 | End: 2017-01-29

## 2017-01-25 RX ORDER — GABAPENTIN 600 MG/1
600 TABLET ORAL 3 TIMES DAILY
Qty: 90 TABLET | Refills: 0 | Status: SHIPPED | OUTPATIENT
Start: 2017-01-25 | End: 2017-03-28

## 2017-01-25 RX ADMIN — LIDOCAINE 1 PATCH: 50 PATCH CUTANEOUS at 08:43

## 2017-01-25 RX ADMIN — ENOXAPARIN SODIUM 40 MG: 40 INJECTION SUBCUTANEOUS at 14:07

## 2017-01-25 RX ADMIN — LORAZEPAM 1 MG: 0.5 TABLET ORAL at 16:36

## 2017-01-25 RX ADMIN — LORAZEPAM 1 MG: 0.5 TABLET ORAL at 06:17

## 2017-01-25 RX ADMIN — GABAPENTIN 600 MG: 300 CAPSULE ORAL at 08:37

## 2017-01-25 RX ADMIN — METOPROLOL SUCCINATE 50 MG: 50 TABLET, EXTENDED RELEASE ORAL at 08:39

## 2017-01-25 RX ADMIN — GABAPENTIN 600 MG: 300 CAPSULE ORAL at 16:35

## 2017-01-25 RX ADMIN — ACETAMINOPHEN 650 MG: 325 TABLET, FILM COATED ORAL at 09:59

## 2017-01-25 RX ADMIN — BACLOFEN 10 MG: 10 TABLET ORAL at 08:39

## 2017-01-25 RX ADMIN — OXYCODONE HYDROCHLORIDE 10 MG: 5 TABLET ORAL at 10:52

## 2017-01-25 RX ADMIN — LOSARTAN POTASSIUM AND HYDROCHLOROTHIAZIDE 2 TABLET: 50; 12.5 TABLET, FILM COATED ORAL at 08:39

## 2017-01-25 RX ADMIN — OXYCODONE HYDROCHLORIDE 10 MG: 5 TABLET ORAL at 02:15

## 2017-01-25 RX ADMIN — Medication 5 MG: at 06:17

## 2017-01-25 RX ADMIN — IBUPROFEN 600 MG: 600 TABLET ORAL at 02:02

## 2017-01-25 RX ADMIN — OXYCODONE HYDROCHLORIDE 10 MG: 5 TABLET ORAL at 14:42

## 2017-01-25 RX ADMIN — CIPROFLOXACIN HYDROCHLORIDE 250 MG: 250 TABLET, FILM COATED ORAL at 08:39

## 2017-01-25 RX ADMIN — DULOXETINE HYDROCHLORIDE 30 MG: 30 CAPSULE, DELAYED RELEASE ORAL at 08:38

## 2017-01-25 RX ADMIN — ASPIRIN 81 MG: 81 TABLET, COATED ORAL at 08:38

## 2017-01-25 RX ADMIN — OXYCODONE HYDROCHLORIDE 10 MG: 5 TABLET ORAL at 06:17

## 2017-01-25 RX ADMIN — BACLOFEN 10 MG: 10 TABLET ORAL at 16:36

## 2017-01-25 ASSESSMENT — PAIN DESCRIPTION - DESCRIPTORS
DESCRIPTORS: CRAMPING;SPASM;SHARP
DESCRIPTORS: ACHING
DESCRIPTORS: SHARP;CRAMPING;SPASM
DESCRIPTORS: ACHING;CONSTANT

## 2017-01-25 NOTE — TELEPHONE ENCOUNTER
This patient was discharged from the hospital today.    Please contact her to inform:  1. She should take Oxycodone 5 mg with limit of 5 per day every 4 hrs prn  2. At her last visit with me she was prescribed 10 mg Oxycodone. Please get a pill count to estimate her next refill date.   3. Was she given and rx for Oxycodone 5 mg at discharge.  4. She can take the 10 mg tablet(s), but needs to cut them in 1/2  5. She should destroy the Klonopin.    6. I noticed she is on Ativan now. I will not be refilling that RX and it will need to be refilled by her PCP or her Neurologist.  7. Please confirm her appointment date with her Neurologist.  8. She needs to schedule follow up with her PCP to get the home care Physical Therapy    9. I would like to see her in 3-4 weeks or before her next opioid RX is due. She should bring her pill bottles with her.     Saba Worley, CNP    [facility]

## 2017-01-25 NOTE — PROGRESS NOTES
SPIRITUAL HEALTH SERVICES  SPIRITUAL ASSESSMENT Progress Note  [facility] [address]    PRIMARY FOCUS:     Emotional/spiritual/Hoahaoism distress    Support for coping    ILLNESS CIRCUMSTANCES:   Reviewed documentation. Reflective conversation shared with Donita which integrated elements of illness and family narratives.     Context of Serious Illness/Symptom(s) - Pt has MS, a history of cancer, chronic pain, and leg spasms    Resources for Support -  and children    DISTRESS:     Emotional/Existential/Relational Distress - As pt recounted her illness narrative and struggles with chronic pain, she described times where she is overwhelmed with pain for over 15 hours, in these moments she has thoughts wishing for death as a form of release.  As she described this desire for death she welled up with tears and openly wept expressing sadness, fear, and helplessness.     Spiritual/Anglican Distress - Spiritual distress articulated at finding no meaning in life at times     Social/Cultural/Economic Distress - No distress named     SPIRITUAL/Hoahaoism COPING:     Faith/Danay - Pt did not name a particular danay but named God as a source of strength and force that has enabled her to endure pain.    Spiritual Practice(s) - Non named    Emotional/Existential/Relational Connections - Pt is about to become a grandmother for the first time, she named this a source of purpose.    GOALS OF CARE:    Goals of Care - To work towards treatments that will lead to less pain, and to be around for her grandchild.     Meaning/Sense-Making - Donita vacillates between finding meaning in life and hope for future and managing pain and not just wishing for death.    PLAN: No plans due to discharge later today.      Placido Bruce M.Div.  Staff   Pager [phone]

## 2017-01-25 NOTE — PLAN OF CARE
Problem: Goal Outcome Summary  Goal: Goal Outcome Summary  Outcome: No Change  Neuro: A&O x4, some forgetfulness  VS: VSS  Pain: C/o pain and spasms in legs, reports some relief with baclofen, ativan x2, oxycodone x2, and heat therapy  Resp: LS diminished, dyspnea on exhurtion  Cardiac: WDL  GI/: Bowel sounds present, passing gas, urinating adequate amount on commode  Diet: Tolerating regular diet  Skin/mobility: red/glenn color to LE, up with Ax1 pivot with walker  Did teaching on medications, gave printout of the PRN and scheduled medications for spasms and pain.  Per pain and palliative nurse, this may help her gain a better understanding of what to take and when. Per PT talked about various support groups and resources, also [place] to home discharge such as stairs. Pt was calm, accepting and cooperative.   Will continue to monitor and provide supportive care.

## 2017-01-25 NOTE — PLAN OF CARE
Problem: Goal Outcome Summary  Goal: Goal Outcome Summary    PT: Patient seen by physical therapy for treatment.  Patient performed pivot transfer between bed and bedside commode with min A and 2WW.  Therapist facilitated increased weight bearing and gentle stretch at left LE in standing at bedside with 2WW.  Therapist then facilitated left LE stretching of hip flexor, hamstring and heelcord.  Patient with limited tolerance of stretching with increase in spasms.  Discussion with patient regarding recommendation for aquatic physical therapy following baclofen placement for increased stretching, gait training and strengthening.  Discussed scooter vs motorized WC; patient would greatly benefit from sit to stand motorized wheelchair for improved weight bearing and stretching to reduce spasms, however patient's home has stairs at both back and front to enter home (stairs on inside and unable to make a ramp).  Patient presenting with baseline mobility status at this time.

## 2017-01-25 NOTE — PROGRESS NOTES
[facility]  Pain Management Progress Note    Date of Service (when I saw the patient): 01/25/2017     Assessment and Plan    Amanda Richardson is a 51 year old female who was admitted on 1/20/2017 through the ED for uncontrolled pain and muscle spasms.    1) Acute pain flare of chronic pain/spasm.  Unclear if exacerbated by UTI and inflammatory process.  Improved.       2)  Patient with chronic low back pain, bilateral leg pain and spasms, R greater than L, on chronic opioid therapy managed by Saba Worley CNP.  [facility] database review: Shows consistent prescribing of opioids and controlled substances by 2 providers in same clinic. Patient with appropriate refill patterns consistent with care plan. Noted increase in oxycodone tablet numbers consistent with increasing max dose ordered by provider.  45 mg Daily Morphine Equivalent increased to 75 mg Daily Morphine Equivalent on 1/19/2017.  Patient has a chronic opioid tolerance.     Patient's opioid use thus far:    In the last 24 hours    Oxycodone 50mg   = 75 mg Daily Morphine Equivalent  Taking oxycodone and motrin on a more regular schedule.    3)  Opioid induced side-effects:  -Constipation    Last bowel movement 1/24/2017.  -Sedation  Improved with dc of hydroxyzine and decreased dose of oxycodone.        4) Other/Related:    -Depression/anxiety  -Deconditioning    PLAN:   1)Continue with treatment of UTI with cipro as per Hospitalist. Completed course of solumedrol for potential inflammatory cause of increased pain.  2) Will not increase oxycodone dosage from home. Patient instructed to limit total doses to 5 per day. Add Ativan for home. Pt to stop taking klonazepam.   3) Continue with baclofen pain pump trial as scheduled for 2/28/17. Unable to be expedited.    4)Multimodal Medication Therapy  Topical:  Lidoderm 5% patch daily    NSAIDS:  Ibuprofen 600 mg q 6 hours prn    Muscle Relaxants:    Baclofen 10mg PO three times per day  scheduled. Increased by neurologist on 1/23.  Baclofen 5mg PO three times per day prn    Adjuvants:  DC Hydroxyzine  Gabapentin 600mg PO three times per day. Increased by neurologist on 1/23.    Other medications:  -Acetaminophen 650mg PO PRN    Antidepresants/anxiolytics:  Cymbalta EC 30mg PO q am and 60mg PO q pm  Amitriptyline 100mg PO HS for insomnia  Lorazepam 0.5-1mg IV or PO every 6 hours prn muscle spasms  - scheduled dose of lorazepam at HS in addition to prn dosing.  - For home will prescribe lorazepam 0.5mg three times per day prn spasms. Patient to take one dose at bedtime, and may have two other doses as needed daily.      Opioids:    Oxycodone 10 mg q 4 hours prn limit to 5 tabs per day. Patient has a script already filled at home and should follow up with Saba Worley NP, [facility] for ongoing pain management and opioid prescriptions.    4)Non-medication interventions  Tens Unit when back home.  Heat to painful areas per patient comfort, being careful to avoid lidoderm patch. Patient reminded of this.   following.  Discussed with Dr Brandon consider home PT.    5)Constipation Prophylaxis  As ordered. Pt requests to keep PRN. Patient has stool softeners at home.    6) DC safety  - Patient verbalizes compliance with opioid medication security at home and understanding on naloxone use for self and family at discharge.  -Do not recommend prescribing opioids at discharge. Patient has script from 1/20/17 for oxycodone from her primary prescriber, Saba Worley NP from the [facility].  -Patient instructed NOT to take klonazepam and destroy medication at home. Will use Ativan instead.  -Patient verbalizes understanding to follow up with Saba Worley at the [facility] for monitoring of pain, spasms and medication management.      Armida BEACH, CNS  Pain Management and Palliative Care  [facility]  Pgr:  "[phone]        Armida BEACH, CNS  Pain Management and Palliative Care  [facility]  Pgr: [phone]    Time Spent on This Encounter  I spent  30 minutes is assessment of the patient and discussion with the patient and family.  Another 20 minutes in review of chart, documentation and discussion with the health care team.      Interval History   Patient sitting up at bedside. Calm, conversive, waiting to brush teeth. Pain scores consistently 7/10 since yesterday. However patient verbalizing that she is improving and functionally \"better\" than when this pain flare started.  Up with 1 and walker to [facility] [place]. Has written information at bedside from nursing to help with pain management choices. In addition to lidoderm patch, baclofen, neurontin, scheduled HS ativan and prn heat to RLE, she took more doses of prn ativan, motrin and oxycodone last night and this morning. Has had episodes of pain and spasms but \"much more controlled\". Currently denies pain in lumbar area at present. Pain persists in Right Hip/trochanter and radiating down Right leg. During conversation with patient, much fewer spasms noted. Patient moves RLE slightly more easily. Patient feels like she can manage her current symptoms at home.     Patient feels ativan is working for her for increased spasms and did not want to switch to the klonazepam which she has script for at home.  Patient's  was not able to bring in her TENS unit. She will resume at discharge.        Review of Systems   C: NEGATIVE for fever, chills, change in weight  E/M: NEGATIVE for ear, mouth and throat problems  R: NEGATIVE for significant cough or SOB  CV: NEGATIVE for chest pain, palpitations or peripheral edema  GI: NEGATIVE for nausea, abdominal pain, heartburn, or change in bowel habits or constipation  NEURO: NEGATIVE for dizziness and POSITIVE for numbness and weakness in RLE.  ROS otherwise negative    Physical Exam  Temp:  [97.2  F (36.2 "  C)-98.2  F (36.8  C)] 98.2  F (36.8  C)  Pulse:  [83] 83  Heart Rate:  [85-89] 89  Resp:  [14-18] 16  BP: (104-162)/(33-64) 104/33 mmHg  SpO2:  [94 %] 94 %  243 lbs 0 oz  GEN:  Alert, oriented x 3, appears much more comfortable with fewer spasms, NAD.  HEENT:  Normocephalic/atraumatic, no scleral icterus, no nasal discharge, mouth moist.  CV:  RRR, S1, S2; no murmurs or other irregularities noted.  +3 DP/PT pulses bilatererally; no edema LLE, trace RLE  RESP:  Clear to auscultation bilaterally without rales/rhonchi/wheezing/retractions.  Symmetric chest rise on inhalation noted.  Normal respiratory effort.  ABD:  Rounded, soft, non-tender/non-distended.  +BS, BM x1 1/24/17.  EXT:  Edema & pulses as noted above.  CMS intact x 3.   Decreased sensation RLE from ankle and distally.  M/S:   Tender to palpation Right hip, trochanter.    SKIN:  Dry to touch, glenn LEs with scaly skin.    NEURO: Symmetric strength +5/5 UEs. +3 RLE/+5LLE  Sensation see EXT.   There is no area of allodynia or hyperesthesia.  PAIN BEHAVIOR: Cooperative  Psych:  Normal affect.  Calm, cooperative, conversant appropriately.    Medications       LORazepam  0.5 mg Oral At Bedtime     gabapentin  600 mg Oral TID     baclofen  10 mg Oral TID     ciprofloxacin  250 mg Oral Q12H JOSE     enoxaparin  40 mg Subcutaneous Q24H     amitriptyline  100 mg Oral At Bedtime     aspirin EC  81 mg Oral Daily     lidocaine  1 patch Transdermal Q24H     metoprolol  50 mg Oral Daily     losartan-hydrochlorothiazide  2 tablet Oral Daily     lidocaine   Transdermal Q24h     lidocaine   Transdermal Q8H     DULoxetine (CYMBALTA) EC capsule 30 mg  30 mg Oral QAM     DULoxetine (CYMBALTA) EC capsule 60 mg  60 mg Oral QPM       Data  No results found for this or any previous visit (from the past 24 hour(s)).

## 2017-01-25 NOTE — PLAN OF CARE
Problem: Goal Outcome Summary  Goal: Goal Outcome Summary  Outcome: Improving  Ambulatory Status:  Pt up A x 1 to [facility] with walker.  VS:  AFVSS.  On RA.    Pain:  7/10 in low back/right leg- oxycodone 10mg given x 2 in addition to scheduled baclofen/ativan.  Resp: LS diminished.  GI:  Denies nausea.  Good appetite and on regular diet.  BS hypoactive.  Passing flatus. Last BM 1/24.  :  Voiding without difficulty- incontinent at times.  Skin:  Blotchy/bruised.  BLE red/glenn/dry.  Tx:  P.O. cipro (UTI).  Consults:  Pain, neuro, PT  Disposition:  D/C back home today-  will provide transport- meds filled in lock box.

## 2017-01-25 NOTE — PLAN OF CARE
End of Shift Summary.  For vital signs and complete assessments, please see documentation flowsheets.     Pertinent assessments: pt alert and oriented, can make her needs known. Pt continues to have severe right leg pain at times, taking oxycodone, baclofen and ativan with some relief.   Major Shift Events: none  Plan (Upcoming Events): continue current cares  Discharge/Transfer Needs: possible discharge to home today or tomorrow    Bedside Shift Report Completed   Bedside Safety Check Completed

## 2017-01-26 NOTE — TELEPHONE ENCOUNTER
Routing to Saba to review and advise.  Haven Washburn, [facility]

## 2017-01-26 NOTE — PLAN OF CARE
Problem: Goal Outcome Summary  Goal: Goal Outcome Summary  Outcome: Adequate for Discharge Date Met:  01/25/17  Pt A&O x4. Discharge summary and medications reviewed with Pt and spouse. All questions answered. Pt to discharge home via wheelchair with .

## 2017-01-26 NOTE — DISCHARGE SUMMARY
PRIMARY CARE PHYSICIAN:  Dr. Julius Hassan.      DATE OF ADMISSION:  01/20/2017.       DATE OF DISCHARGE:  01/25/2017.       DISCHARGE DISPOSITION:  Home.      DISCHARGE CONDITION:  Stable.      PHYSICAL EXAMINATION:   GENERAL:  The patient is awake and alert, comfortable appearing, not in acute distress.   NEUROLOGIC:  Exam is at baseline, unchanged from before.      DISCHARGE DIAGNOSES:   1.  Exacerbation of muscle spasms with underlying history of multiple sclerosis.  Felt to be secondary to her chronic progressive multiple sclerosis.   2.  Acute on chronic pain syndrome.   3.  Urinary tract infection.   4.  Hypertension.   5.  Microscopic hematuria.      CONSULTATIONS:    1.  Pain management services.   2.  Neurology.      IMAGING RESULTS:  None.      PENDING LAB TESTS:  None.      HISTORY OF PRESENT ILLNESS:  Please refer the H&P for full details.  In brief, Amanda Richardson is a 51-year-old female with a history of chronic progressive MS who presented to the hospital with complaints of spasms and leg pain.      HOSPITAL COURSE:  The patient was found to have urinary tract infection.  This might have exacerbated her symptoms, which are felt to be secondary to her chronic progressive MS.  She was seen by Neurology in consultation.  She was started on baclofen and her dose of gabapentin was adjusted.  She was seen also by pain consult services.  After management of her pain medications, she is doing much better at this time.  She is still having some pain, but it is manageable and she feels that her functional status is actually better than it has been before.  She also received a 3-day course of empiric IV steroids per Neurology in casethe MS was contributing to her symptoms.  She was treated with ciprofloxacin for her UTI.      At this time, the patient is doing much better and feels like she can manage her symptoms at home and appears stable for discharge.  There were some medication changes made to her pain  regimen by the Pain and Palliative Care services and she will follow up with her outpatient pain providers and the pain clinic.  She is supposed to have a baclofen pump implantation coming up as well in the future.      DISCHARGE DIET:  Regular diet.      DISCHARGE FOLLOWUP:   1.  With the primary care provider or the pain specialist in 1-2 weeks for hospital followup.   2.  Of note, the patient had minimal microscopic hematuria noted on UA. This is likely related to the UTI. However, I have advised patient that she should have a repeat urinalysis once her UTI is completely treated and if the microscopic hematuria is still present, then she may need further workup.      Total time spent in face-to-face contact with the patient and coordinating discharge was more than 30 minutes      ALLERGIES:   1.  Lisinopril.   2.  Flexeril.      Discharge Medication List as of 1/25/2017  5:46 PM      START taking these medications    Details   ciprofloxacin (CIPRO) 250 MG tablet Take 1 tablet (250 mg) by mouth 2 times daily for 7 doses, Disp-7 tablet, R-0, E-Prescribe      baclofen (LIORESAL) 10 MG tablet Take 1 tablet (10 mg) by mouth 3 times daily, Disp-90 tablet, R-0, E-Prescribe      ATIVAN 0.5 MG tablet Three times per day. Take one dose at bedtime. May have 2 other doses every 6 hours if needed for muscle spasms. Max of 3 tabs per day. Do not take if sleepy., Disp-42 tablet, R-0, AISHA, Local PrintDispense 14 day supply         CONTINUE these medications which have CHANGED    Details   gabapentin (NEURONTIN) 600 MG tablet Take 1 tablet (600 mg) by mouth 3 times daily, Disp-90 tablet, R-0, E-Prescribe         CONTINUE these medications which have NOT CHANGED    Details   ASPIRIN PO Take 162 mg by mouth daily, Historical      !! DULOXETINE HCL PO Take 30 mg by mouth every morning, Historical      !! DULOXETINE HCL PO Take 60 mg by mouth every evening, Historical      amitriptyline (ELAVIL) 100 MG tablet Take 1 tablet (100 mg)  by mouth At Bedtime, Disp-90 tablet, R-3, E-Prescribe      losartan-hydrochlorothiazide (HYZAAR) 100-25 MG per tablet Take 1 tablet by mouth daily, Disp-90 tablet, R-3, E-Prescribe      metoprolol (TOPROL-XL) 50 MG 24 hr tablet Take 1 tablet (50 mg) by mouth daily, Disp-90 tablet, R-3, E-Prescribe      lidocaine (LIDODERM) 5 % patch Apply 1  patches to low back and right buttock ( cut to fit)  area at once for up to 12 h within a 24 h period.  Remove after 12 hours.Disp-30 patch, [id number]      glatiramer (COPAXONE) 20 MG/ML injection Inject 1 mL (20 mg) Subcutaneous daily, Disp-30 each, R-0, Local Print      Cholecalciferol (VITAMIN D PO) Take 1 tablet by mouth daily 1000 IU daily, Historical      Multiple Vitamin (MULTI-VITAMIN PO) Take 1 tablet by mouth daily , Historical      omega-3 fatty acids (FISH OIL) 1200 MG capsule Take 1 capsule by mouth daily., Historical      oxyCODONE (ROXICODONE) 10 MG IR tablet Take 1 to 2 tablet(s) every 4 hours as needed for pain limit 7  tablet(s) per day. 30 day supply . Start 17, dispense on or after 17, Disp-210 tablet, R-0, Local Print       !! - Potential duplicate medications found. Please discuss with provider.      STOP taking these medications       CLONAZEPAM PO Comments:   Reason for Stopping:         gabapentin (NEURONTIN) 300 MG capsule Comments:   Reason for Stopping:                    ERIK BRYANT MD             D: 2017 17:35   T: 2017 14:48   MT: [id number]      Name:     JIMMY SYLVESTER   MRN:      [id number]        Account:        [id number]   :      1965           Admit Date:                                       Discharge Date: 2017      Document: [id number]       cc: Julius Hassan MD

## 2017-01-26 NOTE — TELEPHONE ENCOUNTER
Patient has enough Oxycodone until her next visit. She was recently in hospital and her Oxycodone dose was reduced to 5 tablet(s) per day. At this dosing per day, she will not need PA. She needs to schedule follow up with me before she runs out.  Saba Worley, CNP   [facility]

## 2017-01-26 NOTE — PLAN OF CARE
Physical Therapy Discharge Summary    Reason for therapy discharge:    Discharged to home.    Progress towards therapy goal(s). See goals on Care Plan in [facility] electronic health record for goal details.  Goals partially met.  Barriers to achieving goals:   limited tolerance for therapy and discharge from facility.    Therapy recommendation(s):    Continue home exercise program.

## 2017-01-27 NOTE — TELEPHONE ENCOUNTER
Discussed with Saba. She is thinking no PA will be needed, that it's a quantity issue to fill. Was not a problem when patient was taking 5/day.     Per [facility], Amanda needs have follow up with PCP scheduled prior to following up with Saba and Amanda needs to be sure her appointment is PRIOR to her running out of medication.    Called and spoke with Amanda - follow up scheduled 2/16/17. Discussed requirement of PCP appointment set up prior to coming on the 16th. Amanda agreed she would get that set up.        Haven Coreas, MSN, RN-BC  Care Coordinator  [facility]

## 2017-02-16 ENCOUNTER — OFFICE VISIT (OUTPATIENT)
Dept: PALLIATIVE MEDICINE | Facility: CLINIC | Age: 52
End: 2017-02-16
Payer: COMMERCIAL

## 2017-02-16 VITALS
SYSTOLIC BLOOD PRESSURE: 127 MMHG | OXYGEN SATURATION: 91 % | HEART RATE: 88 BPM | BODY MASS INDEX: 35.44 KG/M2 | WEIGHT: 240 LBS | DIASTOLIC BLOOD PRESSURE: 74 MMHG

## 2017-02-16 DIAGNOSIS — G89.4 CHRONIC PAIN SYNDROME: Primary | Chronic | ICD-10-CM

## 2017-02-16 DIAGNOSIS — M79.605 CHRONIC PAIN OF BOTH LOWER EXTREMITIES: ICD-10-CM

## 2017-02-16 DIAGNOSIS — M79.604 CHRONIC PAIN OF BOTH LOWER EXTREMITIES: ICD-10-CM

## 2017-02-16 DIAGNOSIS — M54.16 LUMBAR RADICULOPATHY: ICD-10-CM

## 2017-02-16 DIAGNOSIS — Z79.899 ENCOUNTER FOR LONG-TERM (CURRENT) USE OF HIGH-RISK MEDICATION: ICD-10-CM

## 2017-02-16 DIAGNOSIS — G89.29 CHRONIC PAIN OF BOTH LOWER EXTREMITIES: ICD-10-CM

## 2017-02-16 DIAGNOSIS — G35 MS (MULTIPLE SCLEROSIS) (H): ICD-10-CM

## 2017-02-16 DIAGNOSIS — M62.838 MUSCLE SPASM: ICD-10-CM

## 2017-02-16 PROCEDURE — 99213 OFFICE O/P EST LOW 20 MIN: CPT | Performed by: NURSE PRACTITIONER

## 2017-02-16 RX ORDER — BACLOFEN 10 MG/1
10 TABLET ORAL 3 TIMES DAILY
Qty: 120 TABLET | Refills: 1 | Status: SHIPPED | OUTPATIENT
Start: 2017-02-16 | End: 2017-03-28

## 2017-02-16 RX ORDER — OXYCODONE HYDROCHLORIDE 10 MG/1
TABLET ORAL
Qty: 180 TABLET | Refills: 0 | Status: SHIPPED | OUTPATIENT
Start: 2017-02-16 | End: 2017-02-22

## 2017-02-16 ASSESSMENT — PAIN SCALES - GENERAL: PAINLEVEL: SEVERE PAIN (7)

## 2017-02-16 NOTE — PROGRESS NOTES
Interval history :This a follow up examination  for Amanda Richardson who is an 49 year old female, who is  being seen in follow up for chronic pain.   Primary Care Provider:Julius Hassan MD.   in visit today  Last visit 11/02/16  Today's date: 02/16/2017      Chief complaints    Leg spasms better baclofen pump trial 2/28   short acting     Back with right leg pain, spasm better    Opioid analgesia Oxycodone 10 mg high tolerance, taking 6 sometimes 7 per day. ( IP pain consult state 5 /day). Needs at least on every 4 hours   Spouse irritable when discussing opioid use.  No home care or Physical Therapy, does not have PCP visit scheduled    Has appointment with Dr. Vigil neurologist for MS.    Current Pain:   Description: aching, sharp, tender,shooting, spasms off and on all day long   Location: low back, right hip right gluteal, right leg,   Pain level: 7/10,  Average7/10, range 7-10/10  Quality  Worse at evening-night, continuous, exhausting, miserable, nagging, unbearable     Aggravating factors: inactivity, over doing   Relieving factors  Rest, medications, ice, stretching, Botox( 08/2015),TENS, meds,ice lidoderm   Quality of life: mood depressed,.  Denies suicidal thoughts,but frustrated.   Activity level not much walking any more, struggles with self care. Sleep still poor, sleeps periods of 2 hours at a time waking with painful spasms with a total 4-6 hours per night or less, naps during the day.     Progress in Program:   Counseling prn, Physical Therapy: complete,  Self care daily TENS, theracane, ice,  exercise yes,walking relaxation yes, body awareness no.    PAST MEDICATION TRIALS:  Hydrocodone,Fentanyl, OxyContin, Lea, Tramadol, Ibuprofen, Naproxen, Flexeril, Tizanidine, Cymbalta, Lyrica, Fentanyl. Morphine  ER, Flexeril, Baclofen (80 mg/day)    PMHX:  Past Medical History   Diagnosis Date     Abnormality of gait 7/27/2012     CARDIOVASCULAR SCREENING; LDL GOAL LESS THAN 160 6/11/2012     Chronic  pain      [facility]     Colon polyps 1/15     tubular adenomas x 2     Gallstone 6/11/2012     Hypertension goal BP (blood pressure) < 140/90      Leukocytosis 6/11/2012     Lumbago 7/20/2012     Moderate depressive episode (H)      MS (multiple sclerosis) (H) 2003     Dr Vigil, Dr Green     Non Hodgkin's lymphoma (H) 6/11/2012     Dr Erickson - [place] - [facility] - T cell     Nonallopathic lesion of cervical region, not elsewhere classified 9/24/2012     Nonallopathic lesion of thoracic region, not elsewhere classified 9/24/2012     Numbness and tingling      From MS Feet, hands and around the waist line.     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     Fusion lumbar anterior, fusion lumbar posterior two levels, combined  10/17/2013     lumbar fusion - Dr Floyd     Surgical history of -   3/14     Left L4-5 Epidural Dr Winter     Colonoscopy N/A 1/7/2015     tubular adenomas x 2 - due 5 yrs     Surgical history of -   5/15     Right Bimalleolar ankle fx ORIF     Current Medications:   Current Outpatient Prescriptions   Medication     baclofen (LIORESAL) 10 MG tablet     oxyCODONE (ROXICODONE) 10 MG IR tablet     gabapentin (NEURONTIN) 600 MG tablet     ATIVAN 0.5 MG tablet     ASPIRIN PO     DULOXETINE HCL PO     DULOXETINE HCL PO     amitriptyline (ELAVIL) 100 MG tablet     losartan-hydrochlorothiazide (HYZAAR) 100-25 MG per tablet     metoprolol (TOPROL-XL) 50 MG 24 hr tablet     lidocaine (LIDODERM) 5 % patch     glatiramer (COPAXONE) 20 MG/ML injection     Cholecalciferol (VITAMIN D PO)     Multiple Vitamin (MULTI-VITAMIN PO)     omega-3 fatty acids (FISH OIL) 1200 MG capsule     No current facility-administered  medications for this visit.      ROS: twelve systems review negative except for: URI , fever, headache, joint pain, insomnia, blood in urine, urgency, weakness, stiffness, spasm, stiffness. numbness/tingling, and depression     Physical Exam  Constitutional:Blood pressure 127/74, pulse 88, weight 108.9 kg (240 lb), last menstrual period 12/11/2011, SpO2 91 %, not currently breastfeeding.  Psyche:  Fully oriented, Behavioral Observations: Eye contact good. Mood and spirits are  Depressed, but better   Working status no   Musculoskeletal exam: standing with support of exam table but mainly in w/c today. No spasms of right leg noted today at visit  Neuro exam:   Alert,  RONI @ 3 mm, Speech clear fluent and appropriate.  TURCIOS x 4,   Skin/Vascular/ Autonomic:  warm, dry and intact    Opioid risk:   DIRE Score for ongoing opioid management is calculated as follows:    Diagnosis = 3    Intractability = 2    Risk: Psych = 2  Chem Hlth = 2  Reliability = 1  Social = 2    Efficacy = 2    Total DIRE Score = 14 (14 or higher predicts good candidate for ongoing opioid management; 13 or lower predicts poor candidate for opioid management)   Daniel Corado 2006,The Journal of  Pain.,The DIRE Score: Predicting Outcomes of Opioid Prescribing for Chronic Pain Vol.7,No.9, pp 671-681.  MNPMP:  reviewed as expected without evidence of abuse, misuse or diversion.  Opioid tolerance moderate to high Morphine -157  mg /day ( in the setting of benzodiazapine Ativan 0.5 mg tid   Compliance: UDS 06/09/16 expected results, opioid agreement 06/09/16  Analgesia  very poor  Activity:little to none, Adverse events: none  Adherence: poor ( self regulates     Assessment:  1 Lumbar radiculopathy - right hip right leg  and low back  right hip bursitis piriformis syndrome in past    A. Chronic low back pain with bilateral radicular feature L2-3 on right and L3-4 on left. Reports increase low back radicular right leg pain.  Frequent spasms  contributing     - Flexion based pain s/p interventional procedure left L2-3 epidural  controlled     - unlikely there is epidural access problematic due to extent of fusion                -History of lumbar multi level lumbar a/p fusion L4-S1 with pelvic fixation Oct 2013 (procedure below)     Anterior spinal fusion, L4-L5. Insertion of intervertebral biomechanical device, L4-L5 (PEEK device). Anterior lumbar instrumentation, L4-L5, with screw fixation and anterior cover plate. [place] of local autograft bone fro5%m bony endplates.Posterior lumbar fusion, L4-L5 (bilateral intertransverse technique) Posterolateral fusion, L5-S1 (bilateral intertransverse to sacral ala technique). Bilateral decompression, L4-L5.L5-S1. Segmental pedicle screw instrumentation, L4-S1. Bilateral pelvic screw instrumentation with connection to vuyos4ew screws.  2.  Multiple sclerosis-  Spasms better   -  Follow up with Dr. Vigil    -Immobility and isolation problematic.     Patient has Resource given in past for national MS society at last visit  www.nationalmssociety.org/Resources- consider  service and see if MS resources can be available  for this          3. Gait and balance disorder  fall with subsequent injury post ORIF X2 right ankle   Monitor medication effects to reduce falls risk       4. Mood disorder: depression symptoms ( isolation , increase pain and spasms, relationship stress)   5.   Insomnia Non restorative sleep with frequent awakening due to pain and non pain sources, self report of snoring leg jerks onset 11 pm    Plan:  Amitriptyline 100 mg     6. Encounter for continuous use of opioids:    24 opioid total per day  mg per day Oxycodone  mg  , Morphine  EQ  135- 157 mg per day   Concerns with over use  Self medicates   Narcan for home use    Concurrent use of Benzodiazipines       PROCEDURES:  None   Opioid management will continue with [facility]     Future appointment/ Follow up :  One  month with Saba Worley CNP    Neurology follow up with Dr. Vigil is scheduled  Schedule follow up with  Dr. Hassan for Ativan refill,  home care referral and hospital follow up  Baclofen pump trial 2/28/17 Dr. Ramos  Schedule with spine surgeon patient to let me or Dr. Hassan know when you want this referral   Ativan with PCP or MS specialist, concerns with withdrawal, patient has no medication left.  Medications  Oxycodone 10 mg one tablet(s) every 4 hr as needed for pain limit 6 per day   Other:  Narcan nasal spray rationale, use explained, patient     Time spent: 25  minutes including  10 minutes interval history, assessment and medication review/adjustments. 15 minutes for counseling, treatment options, and coordinating care for the above identified medical problem.   Signed: Saba Worley RN, BC, C.A.N.P.  [facility]

## 2017-02-16 NOTE — PATIENT INSTRUCTIONS
Future appointment/ Follow up :  One month with Saba Worley CNP    Neurology follow up with Dr. Vigil is scheduled  With Dr. Hassan for Ativan refill,  home care referral and hospital follow up  Baclofen pump trial 2/28/17 Dr. Ramos  With spine surgeon let me or Dr. Hassan know when you wan this referral     Medications  Oxycodone 10 mg one tablet(s) every 4 hr as needed for pain limit 6 per day   Other:  Narcan nasal spray for respiratory suppression or reduced level of consciousness.     Nurse Triage line:  [phone]   Call this number with any questions or concerns. You may leave a detailed message anytime. Calls are typically returned Monday through Friday between 8 AM and 4:30 PM. We usually get back to you within 2 business days depending on the issue/request.       Medication refills:    For non-narcotic medications, call your pharmacy directly to request a refill. The pharmacy will contact the [facility] for authorization. Please allow 3-4 days for these refills to be processed.     For narcotic refills, call the nurse triage line or send a [employer] message. Please contact us 7-10 days before your refill is due. The message MUST include the name of the specific medication(s) requested and how you would like to receive the prescription(s). The options are as follows:    Pain Clinic staff can mail the prescription to your pharmacy. Please tell us the name of the pharmacy.    You may pick the prescription up at the Pain Clinic (tell us the location) or during a clinic visit with your pain provider    Pain Clinic staff can deliver the prescription to the [facility] pharmacy in the clinic building. Please tell us the location.      Scheduling number: [phone].  Call this number to schedule or change appointments.    We believe regular attendance is key to your success in our program.    Any time you are unable to keep your appointment we ask that you call us at least 24 hours in  advance to let us know. This will allow us to offer the appointment time to another patient.

## 2017-02-16 NOTE — MR AVS SNAPSHOT
After Visit Summary   2/16/2017    Amanda Richardson    MRN: [id number]           Patient Information     Date Of Birth          1965        Visit Information        Provider Department      2/16/2017 3:30 PM Saba Worley APRN CNP [facility]      Today's Diagnoses     Chronic pain syndrome    -  1    MS (multiple sclerosis)        Muscle spasm        Chronic pain of both lower extremities          Care Instructions    Future appointment/ Follow up :  One month with Saba Worley CNP    Neurology follow up with Dr. Vigil is scheduled  With Dr. Hassan for Ativan refill,  home care referral and hospital follow up  Baclofen pump trial 2/28/17 Dr. Ramos  With spine surgeon let me or Dr. Hassan know when you wan this referral     Medications  Oxycodone 10 mg one tablet(s) every 4 hr as needed for pain limit 6 per day   Other:  Narcan nasal spray for respiratory suppression or reduced level of consciousness.     Nurse Triage line:  [phone]   Call this number with any questions or concerns. You may leave a detailed message anytime. Calls are typically returned Monday through Friday between 8 AM and 4:30 PM. We usually get back to you within 2 business days depending on the issue/request.       Medication refills:    For non-narcotic medications, call your pharmacy directly to request a refill. The pharmacy will contact the [facility] Center for authorization. Please allow 3-4 days for these refills to be processed.     For narcotic refills, call the nurse triage line or send a [employer] message. Please contact us 7-10 days before your refill is due. The message MUST include the name of the specific medication(s) requested and how you would like to receive the prescription(s). The options are as follows:    Pain Clinic staff can mail the prescription to your pharmacy. Please tell us the name of the pharmacy.    You may pick the prescription up at  the Pain Clinic (tell us the location) or during a clinic visit with your pain provider    Pain Clinic staff can deliver the prescription to the [facility] pharmacy in the clinic building. Please tell us the location.      Scheduling number: [phone].  Call this number to schedule or change appointments.    We believe regular attendance is key to your success in our program.    Any time you are unable to keep your appointment we ask that you call us at least 24 hours in advance to let us know. This will allow us to offer the appointment time to another patient.             Follow-ups after your visit        Follow-up notes from your care team     Return in about 4 weeks (around 3/16/2017) for opioid refill, Routine Visit.      Who to contact     If you have questions or need follow up information about today's clinic visit or your schedule please contact [facility] directly at [phone].  Normal or non-critical lab and imaging results will be communicated to you by MyChart, letter or phone within 4 business days after the clinic has received the results. If you do not hear from us within 7 days, please contact the clinic through [employer] or phone. If you have a critical or abnormal lab result, we will notify you by phone as soon as possible.  Submit refill requests through [employer] or call your pharmacy and they will forward the refill request to us. Please allow 3 business days for your refill to be completed.          Additional Information About Your Visit        MyChart Information     [employer] gives you secure access to your electronic health record. If you see a primary care provider, you can also send messages to your care team and make appointments. If you have questions, please call your primary care clinic.  If you do not have a primary care provider, please call [phone] and they will assist you.        Care EveryWhere ID     This is your Care EveryWhere ID. This could be used by  other organizations to access your [facility] medical records  [id number]        Your Vitals Were     Pulse Last Period Pulse Oximetry BMI (Body Mass Index)          88 12/11/2011 91% 35.44 kg/m2         Blood Pressure from Last 3 Encounters:   02/16/17 127/74   01/25/17 120/42   01/19/17 132/79    Weight from Last 3 Encounters:   02/16/17 108.9 kg (240 lb)   01/21/17 110.2 kg (243 lb)   01/19/17 108.9 kg (240 lb)              Today, you had the following     No orders found for display         Today's Medication Changes          These changes are accurate as of: 2/16/17  4:29 PM.  If you have any questions, ask your nurse or doctor.               These medicines have changed or have updated prescriptions.        Dose/Directions    baclofen 10 MG tablet   Commonly known as:  LIORESAL   This may have changed:  additional instructions   Used for:  Chronic pain of both lower extremities        Dose:  10 mg   Take 1 tablet (10 mg) by mouth 3 times daily May take an additional 10 mg as needed qd for total of 40 mg per day   Quantity:  120 tablet   Refills:  1       oxyCODONE 10 MG IR tablet   Commonly known as:  ROXICODONE   This may have changed:  additional instructions   Used for:  Chronic pain syndrome, MS (multiple sclerosis) (H)        Take 1  tablet(s) every 4 hours as needed for pain limit 6  tablet(s) per day. 30 day supply . Start 2/18/17, dispense on or after 2/16/17   Quantity:  180 tablet   Refills:  0            Where to get your medicines      These medications were sent to [facility]  [address]  [address] [facility] [place] [address]     Phone:  [phone]     baclofen 10 MG tablet         Some of these will need a paper prescription and others can be bought over the counter.  Ask your nurse if you have questions.     Bring a paper prescription for each of these medications     oxyCODONE 10 MG IR tablet                Primary Care Provider Office Phone # Faj  #    Julius Hassan [phone]       [facility] [address] [facility] [address]        Goals        General    I will continue to work with home care until discharge goals are met. (pt-stated)     Notes - Note edited  10/28/2015  3:09 PM by Cordelia Moreland RN    As of today's date 9/22/2015 goal is met at 76 - 100%.   Goal Status:  Ongoing  As of today's date 10/28/2015 goal is met at 76 - 100%.   Goal Status:  Complete            Thank you!     Thank you for choosing [facility]  for your care. Our goal is always to provide you with excellent care. Hearing back from our patients is one way we can continue to improve our services. Please take a few minutes to complete the written survey that you may receive in the mail after your visit with us. Thank you!             Your Updated Medication List - Protect others around you: Learn how to safely use, store and throw away your medicines at www.disposemymeds.org.          This list is accurate as of: 2/16/17  4:29 PM.  Always use your most recent med list.                   Brand Name Dispense Instructions for use    amitriptyline 100 MG tablet    ELAVIL    90 tablet    Take 1 tablet (100 mg) by mouth At Bedtime       ASPIRIN PO      Take 162 mg by mouth daily       ATIVAN 0.5 MG tablet   Generic drug:  LORazepam     42 tablet    Three times per day. Take one dose at bedtime. May have 2 other doses every 6 hours if needed for muscle spasms. Max of 3 tabs per day. Do not take if sleepy.       baclofen 10 MG tablet    LIORESAL    120 tablet    Take 1 tablet (10 mg) by mouth 3 times daily May take an additional 10 mg as needed qd for total of 40 mg per day       * DULOXETINE HCL PO      Take 30 mg by mouth every morning       * DULOXETINE HCL PO      Take 60 mg by mouth every evening       gabapentin 600 MG tablet    NEURONTIN    90 tablet    Take 1 tablet (600 mg) by mouth 3 times daily       glatiramer 20 MG/ML  injection    COPAXONE    30 each    Inject 1 mL (20 mg) Subcutaneous daily       lidocaine 5 % Patch    LIDODERM    30 patch    Apply 1  patches to low back and right buttock ( cut to fit)  area at once for up to 12 h within a 24 h period.  Remove after 12 hours.       losartan-hydrochlorothiazide 100-25 MG per tablet    HYZAAR    90 tablet    Take 1 tablet by mouth daily       metoprolol 50 MG 24 hr tablet    TOPROL-XL    90 tablet    Take 1 tablet (50 mg) by mouth daily       MULTI-VITAMIN PO      Take 1 tablet by mouth daily       omega-3 fatty acids 1200 MG capsule      Take 1 capsule by mouth daily.       oxyCODONE 10 MG IR tablet    ROXICODONE    180 tablet    Take 1  tablet(s) every 4 hours as needed for pain limit 6  tablet(s) per day. 30 day supply . Start 2/18/17, dispense on or after 2/16/17       VITAMIN D PO      Take 1 tablet by mouth daily 1000 IU daily       * Notice:  This list has 2 medication(s) that are the same as other medications prescribed for you. Read the directions carefully, and ask your doctor or other care provider to review them with you.

## 2017-02-16 NOTE — NURSING NOTE
"Chief Complaint   Patient presents with     RECHECK     follow up       Initial /74  Pulse 88  Wt 108.9 kg (240 lb)  LMP 12/11/2011  SpO2 91%  BMI 35.44 kg/m2 Estimated body mass index is 35.44 kg/(m^2) as calculated from the following:    Height as of 1/20/17: 1.753 m (5' 9\").    Weight as of this encounter: 108.9 kg (240 lb).  Medication Reconciliation: roxanne Nielsen [facility]        "

## 2017-02-18 DIAGNOSIS — G89.4 CHRONIC PAIN SYNDROME: ICD-10-CM

## 2017-02-18 DIAGNOSIS — G35 MS (MULTIPLE SCLEROSIS) (H): Primary | ICD-10-CM

## 2017-02-18 NOTE — TELEPHONE ENCOUNTER
Patient called on-call line.  She is unable to  her oxycodone due to an insurance issue.    Called [facility] in [place].  She has a quantity limit on her opioids, requiring a PA.  This was faxed to the clinic.    Advised at this point, we can only have her pay out of pocket.  She can  a smaller quantity based on prices.  Patient informed. She is to call on Monday with how much she picked up.    We will then fill out PA on Monday, and we will need to call pharmacy to verify how much she got, as the remainder of the script will be voided and she will need a new prescription.    Connie Singh MD  [facility]

## 2017-02-20 ENCOUNTER — TELEPHONE (OUTPATIENT)
Dept: PALLIATIVE MEDICINE | Facility: CLINIC | Age: 52
End: 2017-02-20

## 2017-02-20 RX ORDER — OXYCODONE HYDROCHLORIDE 10 MG/1
TABLET ORAL
Qty: 120 TABLET | Refills: 0 | Status: CANCELLED | OUTPATIENT
Start: 2017-02-20

## 2017-02-20 NOTE — TELEPHONE ENCOUNTER
PA started by [facility].  Patient Last Name: HIGINIO  : 1965  Key: [id number]    QUANTITY LIMIT EXCEPTION. Insurance allows 120 tablets per 30 days    Information required to complete PA:     If dose exceeds the quantity limit or recommended maximum daily dose, please provide clinical evidence to support the use of medication at the specific requested dose for the treatment.  (Clinical evidence must be documented by clinical trials and/or clinical guidelines)    Please include previous attempted dosing regimens and the corresponding customer's response.    Forwarding encounter to provider to provide information requested.     Haven Washburn, [facility]

## 2017-02-20 NOTE — TELEPHONE ENCOUNTER
No call documented yet from Amanda, but called the [facility] in [place] and she picked up #30 of the Oxycodone 10mg IR tablets on 2/19/17. This is a 5-day supply. Routed high priority to the MA pool for prior auth.    Haven Coreas, MSN, RN-BC  Care Coordinator  [facility] [place]

## 2017-02-20 NOTE — TELEPHONE ENCOUNTER
Copied documentation below into another already open TE. Closing this TE to avoid duplicate documentation    Heidi Beltran  BSN-RN Care Coordinator  [facility]

## 2017-02-20 NOTE — TELEPHONE ENCOUNTER
Nurse Line Message 2/20/17    Patient called to report she had to purchase a quantity of 30 tabs of oxycodone this weekend. [phone].    LILLIANA ColesN, RN  Care Coordinator  [facility] [place]

## 2017-02-20 NOTE — TELEPHONE ENCOUNTER
Copied documentation below from an newly opened encounter.     Nurse Line Message 2/20/17     Patient called to report she had to purchase a quantity of 30 tabs of oxycodone this weekend. [phone].     TOM Coles, RN  Care Coordinator  [facility]      Heidi TIJERINAN-RN Care Coordinator  [facility]

## 2017-02-20 NOTE — TELEPHONE ENCOUNTER
"Oxycodone comes in various mg strengths. Quantity limit is 4/day of 5 mg, 10 mg, 15 mg and 20 mg so there may be a way to accommodate insurance limits with other dosing.      Called pt. States that she is taking oxycodone 10 mg 1 tab every 4 hours as needed. Let her know that if that is the case, she must be waking up in the middle of the night to take the medication. Pt states that she does wake up in pain and needs to take something but states that 1 tablet does not really help anyway. Asked pt if she believed decreasing to 4 tab/day would make a big difference in her pain and she said, \"probably not.\"  Pt states that she has taken up to 20 mg of oxycodone at a time which was helpful to manage her pain.  Briefly reviewed past medication history. Hydrocodone, methadone and fentanyl have not been effective. Pt reports that the muscle spasms are the most difficult at this time and she is taking baclofen 10 mg 4 times daily with some relief.   Let her know that we will work with Saba to decide how to best prescribe for insurance coverage.     There are 2 options at this time that will accommodate the insurance requirements:    1. Decrease dose to oxycodone 10 mg every 4-6 hours prn, max 4/day (40 mg vs 60 mg daily) since pt does not believe they are helping much anyway     OR    2. Prescribe Oxycodone 15 mg 1 tablet every 4-6 hours prn, max of 4/day (still 60 mg total daily dose)    Will prep both prescriptions for review by Saba.  Pt is due to start whatever new dose on 2/24.     LILLIANA HerediaN, RN-BC  Patient Care Supervisor/Care Coordinator  [facility]    "

## 2017-02-21 NOTE — TELEPHONE ENCOUNTER
I would like to try Oxycodone 20 mg and have patient  Take 1/2 to 1 tablet(s) every 4 hrs prn pain with limit of 3 tablet(s) per day.  Does patient need RX today or can I sign off on this tomorrow when I am back in the office at [place].    I am also concerned that she has not requested a refill of the Ativan with her PCP. Is she getting this through the Neurologist?  I check the  and it has nt been refilled recently   Saba Worley CNP   [facility]

## 2017-02-21 NOTE — TELEPHONE ENCOUNTER
Discussed further with Saba and agreed that we can give the patient the choice of:    Oxycodone 15 mg tabs,1 tablet every 4-6 hours prn; max of 4/day OR oxycodone 20 mg tabs, max of 3 full tabs/day--see directions below.     Informed Saba that pt will not need to start a new supply until Friday so this does not have to be completed today.  We will need to coordinate disposition of the prescription with the patient.     Both prescriptions prepped; will select appropriate one once details have been discussed with patient.      Emily Nevarez, LILLIANAN, RN-BC  Patient Care Supervisor/Care Coordinator  [facility]

## 2017-02-21 NOTE — TELEPHONE ENCOUNTER
Message left for patient to call triage and let us know the best time to be reached to discuss medication refill options. Number given.    Note - [facility] also wants nursing to verify Ativan use with Amanda - please read below for content on this.     Haven Coreas, MSN, RN-BC  Care Coordinator  [facility]

## 2017-02-22 RX ORDER — OXYCODONE HYDROCHLORIDE 20 MG/1
TABLET ORAL
Qty: 90 TABLET | Refills: 0 | Status: CANCELLED | OUTPATIENT
Start: 2017-02-22

## 2017-02-22 RX ORDER — OXYCODONE HYDROCHLORIDE 15 MG/1
TABLET ORAL
Qty: 120 TABLET | Refills: 0 | Status: SHIPPED | OUTPATIENT
Start: 2017-02-22 | End: 2017-03-20

## 2017-02-22 NOTE — TELEPHONE ENCOUNTER
Medication request reviewed and approved for Oxycodone 15 mg take 1/2-1 tablet(s) every 4 hr as needed for pain limit 4 per day   Saba Worley CNP    [facility]

## 2017-02-22 NOTE — TELEPHONE ENCOUNTER
Patient called. She just missed our call. She now has her phone by her and will be available.    Shannon Fang RT (R)

## 2017-02-22 NOTE — TELEPHONE ENCOUNTER
Patient called at 12:24. She said that she was just calling us back and we can call her at [phone]    Shannon MCMAHAN (R)

## 2017-02-22 NOTE — TELEPHONE ENCOUNTER
Spoke with Amanda - she prefers to try the 15mg strength option.     Ativan - she is planning on getting it from Dr. Hassan - has not been to see him yet but is planning on using this medication.    Routed to [facility] for approval of Oxycodone. Let Amanda know we would call her when this was available for . She said  most likely will  tomorrow, 2/23/17.    Haven Coreas, MSN, RN-BC  Care Coordinator  [facility] [place]

## 2017-02-22 NOTE — TELEPHONE ENCOUNTER
Message left for patient to call triage and let us know the best time to be reached to discuss medication options. Number given.    Haven Coreas, MSN, RN-BC  Care Coordinator  [facility] [place]

## 2017-02-22 NOTE — TELEPHONE ENCOUNTER
Outreach X2 with no answer. I did leave another message as Saba is hoping to connect with her at some point today so her prescription can be processed.    LILLIANA ColesN, RN  Care Coordinator  [facility]

## 2017-02-23 NOTE — TELEPHONE ENCOUNTER
Rx placed at  [facility]. , Fernando, authorized to .   Haven Washburn, [facility]

## 2017-03-03 ENCOUNTER — OFFICE VISIT (OUTPATIENT)
Dept: FAMILY MEDICINE | Facility: CLINIC | Age: 52
End: 2017-03-03
Payer: COMMERCIAL

## 2017-03-03 VITALS
SYSTOLIC BLOOD PRESSURE: 124 MMHG | OXYGEN SATURATION: 94 % | BODY MASS INDEX: 35.55 KG/M2 | HEIGHT: 69 IN | DIASTOLIC BLOOD PRESSURE: 66 MMHG | WEIGHT: 240 LBS | HEART RATE: 76 BPM | TEMPERATURE: 98.5 F

## 2017-03-03 DIAGNOSIS — M54.16 LUMBAR RADICULOPATHY: ICD-10-CM

## 2017-03-03 DIAGNOSIS — Z51.81 MEDICATION MONITORING ENCOUNTER: ICD-10-CM

## 2017-03-03 DIAGNOSIS — G35 MS (MULTIPLE SCLEROSIS) (H): Primary | ICD-10-CM

## 2017-03-03 DIAGNOSIS — G89.4 CHRONIC PAIN SYNDROME: Chronic | ICD-10-CM

## 2017-03-03 DIAGNOSIS — C85.81 OTHER SPECIFIED TYPE OF NON-HODGKIN LYMPHOMA OF HEAD (H): ICD-10-CM

## 2017-03-03 DIAGNOSIS — I87.2 VENOUS STASIS DERMATITIS OF BOTH LOWER EXTREMITIES: ICD-10-CM

## 2017-03-03 DIAGNOSIS — R73.03 PREDIABETES: ICD-10-CM

## 2017-03-03 DIAGNOSIS — M48.061 SPINAL STENOSIS, LUMBAR: ICD-10-CM

## 2017-03-03 DIAGNOSIS — E66.01 MORBID OBESITY, UNSPECIFIED OBESITY TYPE (H): ICD-10-CM

## 2017-03-03 DIAGNOSIS — M62.838 MUSCLE SPASMS OF BOTH LOWER EXTREMITIES: ICD-10-CM

## 2017-03-03 DIAGNOSIS — M54.41 CHRONIC BILATERAL LOW BACK PAIN WITH RIGHT-SIDED SCIATICA: ICD-10-CM

## 2017-03-03 DIAGNOSIS — R25.2 SPASTICITY: ICD-10-CM

## 2017-03-03 DIAGNOSIS — G89.29 CHRONIC BILATERAL LOW BACK PAIN WITH RIGHT-SIDED SCIATICA: ICD-10-CM

## 2017-03-03 DIAGNOSIS — I10 HYPERTENSION GOAL BP (BLOOD PRESSURE) < 140/90: ICD-10-CM

## 2017-03-03 DIAGNOSIS — F32.A MODERATE DEPRESSIVE EPISODE: ICD-10-CM

## 2017-03-03 PROCEDURE — 99214 OFFICE O/P EST MOD 30 MIN: CPT | Performed by: FAMILY MEDICINE

## 2017-03-03 RX ORDER — LORAZEPAM 0.5 MG/1
TABLET ORAL
Qty: 60 TABLET | Refills: 0 | Status: SHIPPED | OUTPATIENT
Start: 2017-03-03 | End: 2018-06-13

## 2017-03-03 ASSESSMENT — ANXIETY QUESTIONNAIRES
6. BECOMING EASILY ANNOYED OR IRRITABLE: NOT AT ALL
1. FEELING NERVOUS, ANXIOUS, OR ON EDGE: NOT AT ALL
IF YOU CHECKED OFF ANY PROBLEMS ON THIS QUESTIONNAIRE, HOW DIFFICULT HAVE THESE PROBLEMS MADE IT FOR YOU TO DO YOUR WORK, TAKE CARE OF THINGS AT HOME, OR GET ALONG WITH OTHER PEOPLE: NOT DIFFICULT AT ALL
5. BEING SO RESTLESS THAT IT IS HARD TO SIT STILL: NOT AT ALL
7. FEELING AFRAID AS IF SOMETHING AWFUL MIGHT HAPPEN: NOT AT ALL
3. WORRYING TOO MUCH ABOUT DIFFERENT THINGS: NOT AT ALL
GAD7 TOTAL SCORE: 0
2. NOT BEING ABLE TO STOP OR CONTROL WORRYING: NOT AT ALL

## 2017-03-03 ASSESSMENT — PATIENT HEALTH QUESTIONNAIRE - PHQ9: 5. POOR APPETITE OR OVEREATING: NOT AT ALL

## 2017-03-03 NOTE — MR AVS SNAPSHOT
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)        Moderate depressive episode (H)        Prediabetes        Hypertension goal BP (blood pressure) < 140/90        Venous stasis dermatitis of both lower extremities        Morbid obesity, unspecified obesity type (H)          Care Instructions    March 3, 2017    Await Baclofen pump decision    Jean's stocking    [facility]                        To reach your care team during and after hours:   [phone]  To reach our pharmacy:        [phone]    Clinic Hours                        Our clinic hours are:    Monday   7:30 am to 7:00 pm                  Tuesday through Friday 7:30 am to 5:00 pm                             Saturday   8:00 am to 12:00 pm      Sunday   Closed      Pharmacy Hours                        Our pharmacy hours are:    Monday   8:30 am to 7:00 pm       Tuesday to Friday  8:30 am to 6:00 pm                       Saturday    9:00 am to 1:00 pm              Sunday    Closed              There is also information available at our web site:  [email or web address]    If your provider ordered any lab tests and you do not receive the results within 10 business days, please call the clinic.    If you need a medication refill please contact your pharmacy.  Please allow 2-3 business days for your refill to be completed.    Our clinic offers telephone visits and e visits.  Please ask one of your team members to explain more.       Use Adly (secure email communication and access to your chart) to send your primary care provider a message or make an appointment. Ask someone on your Team how to sign up for Adly.  Immunizations                      Immunization History   Administered Date(s) Administered     Influenza (H1N1) 12/10/2009     Influenza (IIV3) 12/10/2009, 11/06/2012, 12/08/2014     Influenza Vaccine IM 3yrs+ 4 Valent IIV4 10/21/2013     Pneumococcal 23 valent 01/05/2011     Tdap (Adacel,Boostrix) 09/17/2008        Health Maintenance                         Health Maintenance Due   Topic Date Due     Colon Cancer Screening - FIT Test - yearly  05/08/1973     Hepatitis C Screening  05/08/1981             Follow-ups after your visit        Who to contact     If you have questions or need follow up information about today's clinic visit or your schedule please contact [facility] directly at [phone].  Normal or non-critical lab and imaging results will be communicated to you by [employer], letter or phone within 4 business days after the clinic has received the results. If you do not hear from us within 7 days, please contact the clinic through [employer] or phone. If you have a critical or abnormal lab result, we will notify you by phone as soon as possible.  Submit refill requests through Adly or call your pharmacy and they will forward the refill request to us. Please allow 3 business days for your refill to be completed.          Additional Information About Your Visit        [employer] Information     Adly gives you secure access to your electronic health record. If you see a primary care provider, you can also send messages to your care team and make appointments. If you have questions, please call your primary care clinic.  If you do not have a primary care provider, please call [phone] and they will assist you.        Care EveryWhere ID     This is your Care EveryWhere ID. This could be used by  "other organizations to access your [facility] medical records  [id number]        Your Vitals Were     Pulse Temperature Height Last Period Pulse Oximetry BMI (Body Mass Index)    76 98.5  F (36.9  C) (Oral) 5' 9\" (1.753 m) 12/11/2011 94% 35.44 kg/m2       Blood Pressure from Last 3 Encounters:   03/03/17 124/66   02/16/17 127/74   01/25/17 120/42    Weight from Last 3 Encounters:   03/03/17 240 lb (108.9 kg)   02/16/17 240 lb (108.9 kg)   01/21/17 243 lb (110.2 kg)              Today, you had the following     No orders found for display       Primary Care Provider Office Phone # Fax #    Julius Hassan [phone]       [facility] [address] [address]        Goals        General    I will continue to work with home care until discharge goals are met. (pt-stated)     Notes - Note edited  10/28/2015  3:09 PM by Cordelia Moreland RN    As of today's date 9/22/2015 goal is met at 76 - 100%.   Goal Status:  Ongoing  As of today's date 10/28/2015 goal is met at 76 - 100%.   Goal Status:  Complete            Thank you!     Thank you for choosing [facility]  for your care. Our goal is always to provide you with excellent care. Hearing back from our patients is one way we can continue to improve our services. Please take a few minutes to complete the written survey that you may receive in the mail after your visit with us. Thank you!             Your Updated Medication List - Protect others around you: Learn how to safely use, store and throw away your medicines at www.disposemymeds.org.          This list is accurate as of: 3/3/17  5:41 PM.  Always use your most recent med list.                   Brand Name Dispense Instructions for use    amitriptyline 100 MG tablet    ELAVIL    90 tablet    Take 1 tablet (100 mg) by mouth At Bedtime       ASPIRIN PO      Take 162 mg by mouth daily       ATIVAN 0.5 MG tablet   Generic drug:  LORazepam     42 tablet    " Three times per day. Take one dose at bedtime. May have 2 other doses every 6 hours if needed for muscle spasms. Max of 3 tabs per day. Do not take if sleepy.       baclofen 10 MG tablet    LIORESAL    120 tablet    Take 1 tablet (10 mg) by mouth 3 times daily May take an additional 10 mg as needed qd for total of 40 mg per day       * DULOXETINE HCL PO      Take 30 mg by mouth every morning       * DULOXETINE HCL PO      Take 60 mg by mouth every evening       gabapentin 600 MG tablet    NEURONTIN    90 tablet    Take 1 tablet (600 mg) by mouth 3 times daily       glatiramer 20 MG/ML injection    COPAXONE    30 each    Inject 1 mL (20 mg) Subcutaneous daily       lidocaine 5 % Patch    LIDODERM    30 patch    Apply 1  patches to low back and right buttock ( cut to fit)  area at once for up to 12 h within a 24 h period.  Remove after 12 hours.       losartan-hydrochlorothiazide 100-25 MG per tablet    HYZAAR    90 tablet    Take 1 tablet by mouth daily       metoprolol 50 MG 24 hr tablet    TOPROL-XL    90 tablet    Take 1 tablet (50 mg) by mouth daily       MULTI-VITAMIN PO      Take 1 tablet by mouth daily       omega-3 fatty acids 1200 MG capsule      Take 1 capsule by mouth daily.       oxyCODONE 15 MG IR tablet    ROXICODONE    120 tablet    Take 1/2 to 1 tablet every 4 hours as needed for pain; max of 4 tablet(s) /day. May dispense on/after 2/23/17 to start taking on/after 2/24/17. 30 day supply       VITAMIN D PO      Take 1 tablet by mouth daily 1000 IU daily       * Notice:  This list has 2 medication(s) that are the same as other medications prescribed for you. Read the directions carefully, and ask your doctor or other care provider to review them with you.

## 2017-03-03 NOTE — PROGRESS NOTES
SUBJECTIVE:                                                    Amanda Richardson is a 51 year old female who presents to clinic today for the following health issues:    Amanda presents to the clinic for a follow-up from [facility] due to MS complications.       Hospital Follow-up Visit:    Hospital/Nursing Home/IP Rehab Facility: [facility]  Date of Admission: 1/20/17  Date of Discharge: 1/25/17  Reason(s) for Admission: MS complications            Problems taking medications regularly:  None       Medication changes since discharge: see med list       Problems adhering to non-medication therapy:  None    Summary of hospitalization:  [facility] discharge summary reviewed  Diagnostic Tests/Treatments reviewed.  Follow up needed: none  Other Healthcare Providers Involved in Patient s Care:         None  Update since discharge: improved.     Post Discharge Medication Reconciliation: discharge medications reconciled, continue medications without change.    Plan of care communicated with patient    [facility] - MAURY Worley    Followed by Dr Vigil - MS    NHL - in remission - released by oncology    Recent Baclofen pump trial on 2/27 - went well - being evaluated for long term pump    Depression - notes doing fine    phq = 9, delano = 1    Prediabetes    Glucose   Date Value Ref Range Status   01/21/2017 102 (H) 70 - 99 mg/dL Final     Lab Results   Component Value Date    A1C 6.6 05/02/2016     Htn    BP Readings from Last 3 Encounters:   03/03/17 124/66   02/16/17 127/74   01/25/17 120/42     Venous stasis - stable    [facility] Summary    DISCHARGE DIAGNOSES:   1. Exacerbation of muscle spasms with underlying history of multiple sclerosis. Felt to be secondary to her chronic progressive multiple sclerosis.   2. Acute on chronic pain syndrome.   3. Urinary tract infection.   4. Hypertension.   5. Microscopic hematuria.       CONSULTATIONS:   1. Pain management services.   2. Neurology.        IMAGING RESULTS: None.       PENDING LAB TESTS: None.       HISTORY OF PRESENT ILLNESS: Please refer the H&P for full details. In brief, Amanda Richardson is a 51-year-old female with a history of chronic progressive MS who presented to the hospital with complaints of spasms and leg pain.       HOSPITAL COURSE: The patient was found to have urinary tract infection. This might have exacerbated her symptoms, which are felt to be secondary to her chronic progressive MS. She was seen by Neurology in consultation. She was started on baclofen and her dose of gabapentin was adjusted. She was seen also by pain consult services. After management of her pain medications and adjustment in doses, she is doing much better at this time. She is still having some pain, but it is manageable and she feels that her functional status is actually better than it has been before. She also received a 3-day course of empiric IV steroids per Neurology's direction given the MS was contributing to her symptoms. She was treated with ciprofloxacin for her UTI.       At this time, the patient is doing much better and feels like she can manage her symptoms at home and appears stable for discharge. There were some medication changes made to her pain regimen by the Pain and Palliative Care services and she will follow up with her outpatient pain providers and the pain clinic. She is supposed to have a baclofen pump implantation coming up as well in the future.       DISCHARGE DIET: Regular diet.       DISCHARGE FOLLOWUP:   1. With the primary care provider or the pain specialist in 1-2 weeks for hospital followup.   2. Of note, the patient had microscopic hematuria noted during the hospital stay. I have advised that she have a repeat urinalysis once her UTI is completely treated and if the microscopic hematuria is still present, then she may need further workup and follow up with urologist for this.      Coding guidelines for this visit:  Type  of Medical   Decision Making Face-to-Face Visit       within 7 Days of discharge Face-to-Face Visit        within 14 days of discharge   Moderate Complexity [id number]   High Complexity [id number]          Problem list and histories reviewed & adjusted, as indicated.  Additional history: as documented    Reviewed and updated as needed this visit by clinical staff  Tobacco  Allergies  Meds  Med Hx  Surg Hx  Fam Hx  Soc Hx      Reviewed and updated as needed this visit by Provider         Wt Readings from Last 4 Encounters:   03/03/17 240 lb (108.9 kg)   02/16/17 240 lb (108.9 kg)   01/21/17 243 lb (110.2 kg)   01/19/17 240 lb (108.9 kg)       Health Maintenance    Health Maintenance Due   Topic Date Due     FIT Q1 YR (NO INBASKET)  05/08/1973     HEPATITIS C SCREENING  05/08/1981       Current Problem List    Patient Active Problem List   Diagnosis     MS (multiple sclerosis) (H)     Non Hodgkin's lymphoma (H)     Obesity     Leukocytosis     Gallstone     Hypertension goal BP (blood pressure) < 140/90     Spinal stenosis, lumbar     Lumbago     Abnormality of gait     Nonallopathic lesion of thoracic region     Nonallopathic lesion of cervical region     Pain medication agreement- signed Nov 20,2012     Ex-smoker     Lumbar radiculopathy     Colon polyps     Neuralgia, neuritis, and radiculitis, unspecified     Encounter for long-term current use of medication     Health Care Home     Moderate depressive episode (H)     CARDIOVASCULAR SCREENING; LDL GOAL LESS THAN 160     Prediabetes     Leg muscle spasm     Spasm     Chronic pain syndrome       Past Medical History    Past Medical History   Diagnosis Date     Abnormality of gait 7/27/2012     CARDIOVASCULAR SCREENING; LDL GOAL LESS THAN 160 6/11/2012     Chronic pain      [facility]     Colon polyps 1/15     tubular adenomas x 2     Gallstone 6/11/2012     Hypertension goal BP (blood pressure) < 140/90      Leukocytosis 6/11/2012     Lumbago 7/20/2012      Moderate depressive episode (H)      MS (multiple sclerosis) (H) 2003     Dr Vigil, Dr Green     Non Hodgkin's lymphoma (H) 6/11/2012     [facility] [facility] [place] [facility]     Nonallopathic lesion of cervical region, not elsewhere classified 9/24/2012     Nonallopathic lesion of thoracic region, not elsewhere classified 9/24/2012     Numbness and tingling      From MS Feet, hands and around the waist line.     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 May have 2 other doses every 6 hours if needed for muscle spasms. Max of 3 tabs per day. Do not take if sleepy. 60 tablet 0     oxyCODONE (ROXICODONE) 15 MG IR tablet Take 1/2 to 1 tablet every 4 hours as needed for pain; max of 4 tablet(s) /day. May dispense on/after 2/23/17 to start taking on/after 2/24/17. 30 day supply 120 tablet 0     baclofen (LIORESAL) 10 MG tablet Take 1 tablet (10 mg) by mouth 3 times daily May take an additional 10 mg as needed qd for total of 40 mg per day 120 tablet 1     gabapentin (NEURONTIN) 600 MG tablet Take 1 tablet (600 mg) by mouth 3 times daily 90 tablet 0     ASPIRIN PO Take 162 mg by mouth daily       DULOXETINE HCL PO Take 30 mg by mouth every morning       DULOXETINE HCL PO Take  60 mg by mouth every evening       amitriptyline (ELAVIL) 100 MG tablet Take 1 tablet (100 mg) by mouth At Bedtime 90 tablet 3     losartan-hydrochlorothiazide (HYZAAR) 100-25 MG per tablet Take 1 tablet by mouth daily 90 tablet 3     metoprolol (TOPROL-XL) 50 MG 24 hr tablet Take 1 tablet (50 mg) by mouth daily 90 tablet 3     lidocaine (LIDODERM) 5 % patch Apply 1  patches to low back and right buttock ( cut to fit)  area at once for up to 12 h within a 24 h period.  Remove after 12 hours. 30 patch 6     glatiramer (COPAXONE) 20 MG/ML injection Inject 1 mL (20 mg) Subcutaneous daily 30 each 0     Cholecalciferol (VITAMIN D PO) Take 1 tablet by mouth daily 1000 IU daily       Multiple Vitamin (MULTI-VITAMIN PO) Take 1 tablet by mouth daily        omega-3 fatty acids (FISH OIL) 1200 MG capsule Take 1 capsule by mouth daily.         Allergies    Allergies   Allergen Reactions     Lisinopril      Lip swelling     Flexeril [Cyclobenzaprine Hcl]      Got confused        Immunizations    Immunization History   Administered Date(s) Administered     Influenza (H1N1) 12/10/2009     Influenza (IIV3) 12/10/2009, 11/06/2012, 12/08/2014     Influenza Vaccine IM 3yrs+ 4 Valent IIV4 10/21/2013     Pneumococcal 23 valent 01/05/2011     Tdap (Adacel,Boostrix) 09/17/2008       Family History    Family History   Problem Relation Age of Onset     C.A.D. Father      with CHF      CANCER Father      ? unsure type - abdominal      Hypertension Mother      Thyroid Disease Mother      goiter      Breast Cancer Sister 58     Thyroid Disease Son      Cancer - colorectal No family hx of        Social History    Social History     Social History     Marital status:      Spouse name: Fernando     Number of children: 3     Years of education: 14     Occupational History      Unemployed     Social History Main Topics     Smoking status: Former Smoker     Types: Cigarettes     Quit date: 8/4/2013     Smokeless tobacco: Never Used     Alcohol  "use No     Drug use: No     Sexual activity: Yes     Partners: Male     Other Topics Concern     Parent/Sibling W/ Cabg, Mi Or Angioplasty Before 65f 55m? No     Caffeine Concern Yes     occas     Exercise Yes     as able     Seat Belt Yes     Social History Narrative       All above reviewed and updated, all stable unless otherwise noted    Recent labs reviewed    ROS:  Constitutional, HEENT, cardiovascular, pulmonary, GI, , musculoskeletal, neuro, skin, endocrine and psych systems are negative, except as in HPI or otherwise noted.  OBJECTIVE:                                                    /66  Pulse 76  Temp 98.5  F (36.9  C) (Oral)  Ht 5' 9\" (1.753 m)  Wt 240 lb (108.9 kg)  LMP 12/11/2011  SpO2 94%  BMI 35.44 kg/m2  Body mass index is 35.44 kg/(m^2).  GENERAL: healthy, alert and no distress  EYES: Eyes grossly normal to inspection, extraocular movements - intact, and PERRL  HENT: ear canals- normal; TMs- normal; Nose- normal; Mouth- no ulcers, no lesions  NECK: no tenderness, no adenopathy, no asymmetry, no masses, no stiffness; thyroid- normal to palpation  RESP: lungs clear to auscultation - no rales, no rhonchi, no wheezes  CV: regular rates and rhythm, normal S1 S2, no S3 or S4 and no murmur, no click or rub -  ABDOMEN: soft, no tenderness, no  hepatosplenomegaly, no masses, normal bowel sounds  MS: extremities- no gross deformities noted, no edema  SKIN: no suspicious lesions, no rashes  NEURO: strength and tone- normal, sensory exam- grossly normal, mentation- intact, speech- normal, reflexes- symmetric  BACK: no CVA tenderness, no paralumbar tenderness  PSYCH: Alert and oriented times 3; speech- coherent , normal rate and volume; able to articulate logical thoughts, able to abstract reason, no tangential thoughts, no hallucinations or delusions, affect- normal    DIAGNOSTICS/PROCEDURES:                                                      Results for orders placed or performed during the " hospital encounter of 01/20/17   Basic metabolic panel   Result Value Ref Range    Sodium 139 133 - 144 mmol/L    Potassium 3.5 3.4 - 5.3 mmol/L    Chloride 105 94 - 109 mmol/L    Carbon Dioxide 26 20 - 32 mmol/L    Anion Gap 8 3 - 14 mmol/L    Glucose 102 (H) 70 - 99 mg/dL    Urea Nitrogen 19 7 - 30 mg/dL    Creatinine 0.44 (L) 0.52 - 1.04 mg/dL    GFR Estimate >90  Non  GFR Calc   >60 mL/min/1.7m2    GFR Estimate If Black >90   GFR Calc   >60 mL/min/1.7m2    Calcium 8.4 (L) 8.5 - 10.1 mg/dL   CBC with platelets differential   Result Value Ref Range    WBC 16.0 (H) 4.0 - 11.0 10e9/L    RBC Count 5.05 3.8 - 5.2 10e12/L    Hemoglobin 13.1 11.7 - 15.7 g/dL    Hematocrit 40.7 35.0 - 47.0 %    MCV 81 78 - 100 fl    MCH 25.9 (L) 26.5 - 33.0 pg    MCHC 32.2 31.5 - 36.5 g/dL    RDW 15.4 (H) 10.0 - 15.0 %    Platelet Count 325 150 - 450 10e9/L    Diff Method Automated Method     % Neutrophils 73.1 %    % Lymphocytes 19.6 %    % Monocytes 4.6 %    % Eosinophils 1.8 %    % Basophils 0.5 %    % Immature Granulocytes 0.4 %    Nucleated RBCs 0 0 /100    Absolute Neutrophil 11.7 (H) 1.6 - 8.3 10e9/L    Absolute Lymphocytes 3.1 0.8 - 5.3 10e9/L    Absolute Monocytes 0.7 0.0 - 1.3 10e9/L    Absolute Eosinophils 0.3 0.0 - 0.7 10e9/L    Absolute Basophils 0.1 0.0 - 0.2 10e9/L    Abs Immature Granulocytes 0.1 0 - 0.4 10e9/L    Absolute Nucleated RBC 0.0    UA with Microscopic reflex to Culture   Result Value Ref Range    Color Urine Light Yellow     Appearance Urine Slightly Cloudy     Glucose Urine Negative NEG mg/dL    Bilirubin Urine Negative NEG    Ketones Urine Negative NEG mg/dL    Specific Gravity Urine 1.008 1.003 - 1.035    Blood Urine Small (A) NEG    pH Urine 7.0 5.0 - 7.0 pH    Protein Albumin Urine 10 (A) NEG mg/dL    Urobilinogen mg/dL Normal 0.0 - 2.0 mg/dL    Nitrite Urine Positive (A) NEG    Leukocyte Esterase Urine Trace (A) NEG    Source Midstream Urine     WBC Urine 7 (H) 0 - 2 /HPF     RBC Urine 3 (H) 0 - 2 /HPF    Bacteria Urine Many (A) NEG /HPF    Squamous Epithelial /HPF Urine <1 0 - 1 /HPF    Mucous Urine Present (A) NEG /LPF    Calcium Oxalate Few (A) NEG /HPF   Lactic acid level STAT   Result Value Ref Range    Lactic Acid 0.6 (L) 0.7 - 2.1 mmol/L   Glucose by meter   Result Value Ref Range    Glucose 97 70 - 99 mg/dL   Lactic acid level STAT   Result Value Ref Range    Lactic Acid 0.7 0.7 - 2.1 mmol/L   Glucose by meter   Result Value Ref Range    Glucose 132 (H) 70 - 99 mg/dL   Glucose by meter   Result Value Ref Range    Glucose 136 (H) 70 - 99 mg/dL   Glucose by meter   Result Value Ref Range    Glucose 110 (H) 70 - 99 mg/dL   Glucose by meter   Result Value Ref Range    Glucose 100 (H) 70 - 99 mg/dL   Glucose by meter   Result Value Ref Range    Glucose 170 (H) 70 - 99 mg/dL   Platelet count   Result Value Ref Range    Platelet Count 404 150 - 450 10e9/L   Glucose by meter   Result Value Ref Range    Glucose 124 (H) 70 - 99 mg/dL   Glucose by meter   Result Value Ref Range    Glucose 95 70 - 99 mg/dL   Neurology IP Consult: Patient to be seen: Routine - within 24 hours; hx of multiple sclreosis. now presenting with uncontrolled painful spasms; Consultant may enter orders: Yes    Narrative    Melissa De Santiago MD     1/22/2017  1:45 PM  Full note dictated.  Pt with history of MS and significant LE   spasticity/painful spasms R>L leg. She has UTI with flare of sx   to point that oral pain mgmt is not helping. She is followed by   Dr. Musa EPSTEIN SD, pain clinic and plan is for baclofen pump. She   is sedated from meds overnight but restless. She could give me   history of her treating MDs. She states no steroids in 8 years   for MS. She is on Copaxone.    Exam with RLE weakness/spasticity so can't straighten leg. LLE   spastic by can fully extend slowly. Strength LLE 5/5.   Hyperreflexia BLE and upgoing toe on R. UE seems symmetric and CN   with no deficits.     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 I wonder if UTI has triggered inc   spasticity. Spoke with hospitalist as UTI mild will do an IV   steroid burst to see if this helps spasticity/pain. Solumedrol   125mg a day for 3d then taper. Pain service to eval to see if   they can move up baclofen pump implant. Continue oral baclofen   and consider valium vs klonopin in the hospital but she is   getting ativan so this might no make much difference.    Urine Culture Aerobic Bacterial   Result Value Ref Range    Specimen Description Midstream Urine     Special Requests Specimen received in preservative     Culture Micro >100,000 colonies/mL Escherichia coli (A)     Micro Report Status FINAL 01/23/2017     Organism: >100,000 colonies/mL Escherichia coli        Susceptibility    >100,000 colonies/ml escherichia coli (alfredo) -  (no method available)     AMPICILLIN 4 Susceptible  ug/mL     CEFAZOLIN Value in next row  ug/mL      <=4 SusceptibleCefazolin ALFREDO breakpoints are for the treatment of uncomplicated urinary tract infections.  For the treatment of systemic infections, please contact the laboratory for additional testing.     CEFOXITIN Value in next row  ug/mL      <=4 SusceptibleCefazolin ALFREDO breakpoints are for the treatment of uncomplicated urinary tract infections.  For the treatment of systemic infections, please contact the laboratory for additional testing.     CEFTAZIDIME Value in next row  ug/mL      <=4 SusceptibleCefazolin ALFREDO breakpoints are for the treatment of uncomplicated urinary tract infections.  For the treatment of systemic infections, please contact the laboratory for additional testing.     CEFTRIAXONE Value in next row  ug/mL      <=4 SusceptibleCefazolin ALFREDO breakpoints are for the treatment of uncomplicated urinary tract infections.  For the treatment of systemic infections, please contact the laboratory for additional  testing.     CIPROFLOXACIN Value in next row  ug/mL      <=4 SusceptibleCefazolin SUSY breakpoints are for the treatment of uncomplicated urinary tract infections.  For the treatment of systemic infections, please contact the laboratory for additional testing.     GENTAMICIN Value in next row  ug/mL      <=4 SusceptibleCefazolin SUSY breakpoints are for the treatment of uncomplicated urinary tract infections.  For the treatment of systemic infections, please contact the laboratory for additional testing.     LEVOFLOXACIN Value in next row  ug/mL      <=4 SusceptibleCefazolin SUSY breakpoints are for the treatment of uncomplicated urinary tract infections.  For the treatment of systemic infections, please contact the laboratory for additional testing.     NITROFURANTOIN Value in next row  ug/mL      <=4 SusceptibleCefazolin SUSY breakpoints are for the treatment of uncomplicated urinary tract infections.  For the treatment of systemic infections, please contact the laboratory for additional testing.     TOBRAMYCIN Value in next row  ug/mL      <=4 SusceptibleCefazolin SUSY breakpoints are for the treatment of uncomplicated urinary tract infections.  For the treatment of systemic infections, please contact the laboratory for additional testing.     Trimethoprim/Sulfa Value in next row  ug/mL      <=4 SusceptibleCefazolin SUSY breakpoints are for the treatment of uncomplicated urinary tract infections.  For the treatment of systemic infections, please contact the laboratory for additional testing.     AMPICILLIN/SULBACTAM Value in next row  ug/mL      <=4 SusceptibleCefazolin SUSY breakpoints are for the treatment of uncomplicated urinary tract infections.  For the treatment of systemic infections, please contact the laboratory for additional testing.     Piperacillin/Tazo Value in next row  ug/mL      <=4 SusceptibleCefazolin SUSY breakpoints are for the treatment of uncomplicated urinary tract infections.  For the treatment  of systemic infections, please contact the laboratory for additional testing.     CEFEPIME Value in next row  ug/mL      <=4 SusceptibleCefazolin SUSY breakpoints are for the treatment of uncomplicated urinary tract infections.  For the treatment of systemic infections, please contact the laboratory for additional testing.   Urine Culture Aerobic Bacterial   Result Value Ref Range    Specimen Description Unspecified Urine     Culture Micro Canceled, Test credited Duplicate request     Micro Report Status FINAL 01/21/2017         ASSESSMENT/PLAN:                                                        ICD-10-CM    1. MS (multiple sclerosis) (H) G35    2. Spasticity R25.2    3. Muscle spasms of both lower extremities M62.838 ATIVAN 0.5 MG tablet   4. Chronic bilateral low back pain with right-sided sciatica M54.41     G89.29    5. Lumbar radiculopathy M54.16    6. Spinal stenosis, lumbar M48.06    7. Chronic pain syndrome G89.4    8. 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 Moderate depressive episode (H) F32.1    10. Prediabetes R73.03    11. Hypertension goal BP (blood pressure) < 140/90 I10    12. Venous stasis dermatitis of both lower extremities I83.11     I83.12    13. Morbid obesity, unspecified obesity type (H) E66.01    14. Medication monitoring encounter Z51.81        Discussed treatment/modality options, including risk and benefits, she desires refilled ativan for prn use, baclofen pump, [facility], Neurology - Dr Vigil. All diagnosis above reviewed and noted above, otherwise stable.  See [facility] orders for further details.  Follow up in 2-3 month(s) and as needed.    Health Maintenance Due   Topic Date Due     FIT Q1 YR (NO INBASKET)  05/08/1973     HEPATITIS C SCREENING  05/08/1981       See Patient Instructions    This document serves as a record of the services and decisions personally performed and made by Julius Hassan MD [facility]. It was created on their behalf by Malick  Alek, a trained medical scribe. The creation of this document is based the provider's statements to the medical scribe. Malick Gaston March 3, 2017 5:19 PM           Julius Hassan MD [address]  [address] [phone] Fax

## 2017-03-03 NOTE — PATIENT INSTRUCTIONS
March 3, 2017    Await Baclofen pump decision    Jean's stocking    [facility]                        To reach your care team during and after hours:   [phone]  To reach our pharmacy:        [phone]    Clinic Hours                        Our clinic hours are:    Monday   7:30 am to 7:00 pm                  Tuesday through Friday 7:30 am to 5:00 pm                             Saturday   8:00 am to 12:00 pm      Sunday   Closed      Pharmacy Hours                        Our pharmacy hours are:    Monday   8:30 am to 7:00 pm       Tuesday to Friday  8:30 am to 6:00 pm                       Saturday    9:00 am to 1:00 pm              Sunday    Closed              There is also information available at our web site:  [email or web address]    If your provider ordered any lab tests and you do not receive the results within 10 business days, please call the clinic.    If you need a medication refill please contact your pharmacy.  Please allow 2-3 business days for your refill to be completed.    Our clinic offers telephone visits and e visits.  Please ask one of your team members to explain more.      Use [employer] (secure email communication and access to your chart) to send your primary care provider a message or make an appointment. Ask someone on your Team how to sign up for [employer].  Immunizations                      Immunization History   Administered Date(s) Administered     Influenza (H1N1) 12/10/2009     Influenza (IIV3) 12/10/2009, 11/06/2012, 12/08/2014     Influenza Vaccine IM 3yrs+ 4 Valent IIV4 10/21/2013     Pneumococcal 23 valent 01/05/2011     Tdap (Adacel,Boostrix) 09/17/2008        Health Maintenance                         Health Maintenance Due   Topic Date Due     Colon Cancer Screening - FIT Test - yearly  05/08/1973     Hepatitis C Screening  05/08/1981

## 2017-03-03 NOTE — NURSING NOTE
"Chief Complaint   Patient presents with     Hospital F/U       Initial /66  Pulse 76  Temp 98.5  F (36.9  C) (Oral)  Ht 5' 9\" (1.753 m)  Wt 240 lb (108.9 kg)  LMP 12/11/2011  SpO2 94%  BMI 35.44 kg/m2 Estimated body mass index is 35.44 kg/(m^2) as calculated from the following:    Height as of this encounter: 5' 9\" (1.753 m).    Weight as of this encounter: 240 lb (108.9 kg)..  BP completed using cuff size: sapphire Young MA  "

## 2017-03-04 ASSESSMENT — ANXIETY QUESTIONNAIRES: GAD7 TOTAL SCORE: 0

## 2017-03-04 ASSESSMENT — PATIENT HEALTH QUESTIONNAIRE - PHQ9: SUM OF ALL RESPONSES TO PHQ QUESTIONS 1-9: 9

## 2017-03-16 ENCOUNTER — TRANSFERRED RECORDS (OUTPATIENT)
Dept: HEALTH INFORMATION MANAGEMENT | Facility: CLINIC | Age: 52
End: 2017-03-16

## 2017-03-20 DIAGNOSIS — G35 MS (MULTIPLE SCLEROSIS) (H): ICD-10-CM

## 2017-03-20 DIAGNOSIS — G89.4 CHRONIC PAIN SYNDROME: ICD-10-CM

## 2017-03-20 NOTE — TELEPHONE ENCOUNTER
Medication refill information reviewed.     Due date for Oxycodone is 3/26/17 based on last start date of 2/24/17     Prescriptions prepped for review.     Will route to provider.

## 2017-03-20 NOTE — TELEPHONE ENCOUNTER
Received call from patient requesting refill(s) of Oxycodone     Last picked up from pharmacy on 02/23/2017    Pt last seen by prescribing provider on 02/16/2017  Next appt scheduled for 03/28/2017    Last urine drug screen date 06/09/2016  Current opioid agreement on file (completed within the last year) Yes Date of opioid agreement: 06/09/2016    Processing (pick one and delete the others):      Pt will  in clinic at [facility] location  Fernando will       Will route to nursing pool for review and preparation of prescription(s).       Lottie Nielsen [facility]

## 2017-03-20 NOTE — TELEPHONE ENCOUNTER
Pt LM at 1246:    Pt requesting a refill of oxycodone. , Fernando, usually picks up the Rx at the  clinic  location. Please call when it is ready to be picked up.   -----  Will route to MA pool for assistance with gathering opioid refill information.    LILLIANA HerediaN, RN-BC  Patient Care Supervisor/Care Coordinator  [facility] [place]

## 2017-03-22 ENCOUNTER — HOSPITAL ENCOUNTER (OUTPATIENT)
Dept: CT IMAGING | Facility: CLINIC | Age: 52
Discharge: HOME | End: 2017-03-22
Attending: NEUROLOGICAL SURGERY | Admitting: NEUROLOGICAL SURGERY
Payer: MEDICARE

## 2017-03-22 DIAGNOSIS — M62.838 MUSCLE SPASM: ICD-10-CM

## 2017-03-22 PROCEDURE — 72131 CT LUMBAR SPINE W/O DYE: CPT

## 2017-03-22 RX ORDER — OXYCODONE HYDROCHLORIDE 15 MG/1
TABLET ORAL
Qty: 120 TABLET | Refills: 0 | Status: SHIPPED | OUTPATIENT
Start: 2017-03-26 | End: 2017-04-25

## 2017-03-22 NOTE — TELEPHONE ENCOUNTER
Pt LM at 0626:    States that she will be over there today for a CT scan and thought they could stop by to pick the Rx up if it's available.  Please call back at [phone] or  cell at [phone]  -------  CT scan is scheduled at 4 pm. Called pt. LM that Saba has not signed off on the Rx and we are not sure if the Rx will be ready today. Let her know that it is not due until 3/26 so we have some time and that we would call her when it is ready. Reminded her that we just got the request on Monday.     Emily Nevarez, LILLIANAN, RN-BC  Patient Care Supervisor/Care Coordinator  [facility]

## 2017-03-22 NOTE — TELEPHONE ENCOUNTER
Medication request reviewed and approved.    Saba Worley, CNP    [facility]

## 2017-03-22 NOTE — TELEPHONE ENCOUNTER
Rx placed at  [facility]. Left patient message.  Wrote on envelope , Fernando,  is authorized to  for patient.     [facility]

## 2017-03-28 ENCOUNTER — OFFICE VISIT (OUTPATIENT)
Dept: PALLIATIVE MEDICINE | Facility: CLINIC | Age: 52
End: 2017-03-28
Payer: COMMERCIAL

## 2017-03-28 VITALS — OXYGEN SATURATION: 92 % | HEART RATE: 89 BPM | DIASTOLIC BLOOD PRESSURE: 77 MMHG | SYSTOLIC BLOOD PRESSURE: 152 MMHG

## 2017-03-28 DIAGNOSIS — M79.604 CHRONIC PAIN OF BOTH LOWER EXTREMITIES: ICD-10-CM

## 2017-03-28 DIAGNOSIS — M79.605 CHRONIC PAIN OF BOTH LOWER EXTREMITIES: ICD-10-CM

## 2017-03-28 DIAGNOSIS — G89.29 CHRONIC PAIN OF BOTH LOWER EXTREMITIES: ICD-10-CM

## 2017-03-28 PROCEDURE — 99213 OFFICE O/P EST LOW 20 MIN: CPT | Performed by: NURSE PRACTITIONER

## 2017-03-28 RX ORDER — GABAPENTIN 600 MG/1
600 TABLET ORAL 3 TIMES DAILY
Qty: 90 TABLET | Refills: 0 | Status: SHIPPED | OUTPATIENT
Start: 2017-03-28 | End: 2017-05-15

## 2017-03-28 RX ORDER — BACLOFEN 10 MG/1
10 TABLET ORAL 3 TIMES DAILY
Qty: 120 TABLET | Refills: 1 | Status: SHIPPED | OUTPATIENT
Start: 2017-03-28 | End: 2018-06-13

## 2017-03-28 ASSESSMENT — PAIN SCALES - GENERAL: PAINLEVEL: SEVERE PAIN (7)

## 2017-03-28 NOTE — MR AVS SNAPSHOT
After Visit Summary   3/28/2017    Amanda Richardson    MRN: [id number]           Patient Information     Date Of Birth          1965        Visit Information        Provider Department      3/28/2017 3:30 PM Saba Worley APRN CNP [facility]      Today's Diagnoses     Chronic pain of both lower extremities          Care Instructions    Opioid management will continue with [facility]     Future appointment/ Follow up :  2-3 months with Saba Worley CNP. Contact me if you need further dose adjustment on thebaclofen   Neurology follow up with Dr. Vigil is scheduled  Baclofen insertion 4/12  Follow up with spine surgeon  Medications  Oxycodone 15 mg one tablet(s) every 4 hr as needed for pain limit 4 per day.Next start date 4/25  Increase Gabapentin by 300 mg per day as needed for total daily dose of 1200 mg - 1500 mg per day   May take an additional 10 mg baclofen as needed per day.    Other:  Narcan nasal spray    ----------------------------------------------------------------  Nurse Triage line:  [phone]   Call this number with any questions or concerns. You may leave a detailed message anytime. Calls are typically returned Monday through Friday between 8 AM and 4:30 PM. We usually get back to you within 2 business days depending on the issue/request.       Medication refills:    For non-narcotic medications, call your pharmacy directly to request a refill. The pharmacy will contact the [facility] Management Center for authorization. Please allow 3-4 days for these refills to be processed.     For narcotic refills, call the nurse triage line or send a Raptr message. Please contact us 7-10 days before your refill is due. The message MUST include the name of the specific medication(s) requested and how you would like to receive the prescription(s). The options are as follows:    Pain Clinic staff can mail the prescription to your  pharmacy. Please tell us the name of the pharmacy.    You may pick the prescription up at the Pain Clinic (tell us the location) or during a clinic visit with your pain provider    Pain Clinic staff can deliver the prescription to the [place] pharmacy in the clinic building. Please tell us the location.      Scheduling number: [phone].  Call this number to schedule or change appointments.    We believe regular attendance is key to your success in our program.    Any time you are unable to keep your appointment we ask that you call us at least 24 hours in advance to let us know. This will allow us to offer the appointment time to another patient.             Follow-ups after your visit        Follow-up notes from your care team     Discussed this visit Return in about 2 months (around 5/28/2017) for Routine Visit.      Who to contact     If you have questions or need follow up information about today's clinic visit or your schedule please contact [facility] directly at [phone].  Normal or non-critical lab and imaging results will be communicated to you by Langohart, letter or phone within 4 business days after the clinic has received the results. If you do not hear from us within 7 days, please contact the clinic through [employer] or phone. If you have a critical or abnormal lab result, we will notify you by phone as soon as possible.  Submit refill requests through [employer] or call your pharmacy and they will forward the refill request to us. Please allow 3 business days for your refill to be completed.          Additional Information About Your Visit        [employer] Information     [employer] gives you secure access to your electronic health record. If you see a primary care provider, you can also send messages to your care team and make appointments. If you have questions, please call your primary care clinic.  If you do not have a primary care provider, please call [phone] and they will  assist you.        Care EveryWhere ID     This is your Care EveryWhere ID. This could be used by other organizations to access your [facility] medical records  [id number]        Your Vitals Were     Pulse Last Period Pulse Oximetry             89 12/11/2011 92%          Blood Pressure from Last 3 Encounters:   03/28/17 152/77   03/03/17 124/66   02/16/17 127/74    Weight from Last 3 Encounters:   03/03/17 108.9 kg (240 lb)   02/16/17 108.9 kg (240 lb)   01/21/17 110.2 kg (243 lb)              Today, you had the following     No orders found for display         Today's Medication Changes          These changes are accurate as of: 3/28/17  4:26 PM.  If you have any questions, ask your nurse or doctor.               These medicines have changed or have updated prescriptions.        Dose/Directions    gabapentin 600 MG tablet   Commonly known as:  NEURONTIN   This may have changed:  additional instructions   Used for:  Chronic pain of both lower extremities        Dose:  600 mg   Take 1 tablet (600 mg) by mouth 3 times daily May take an additional 300 mg per day as needed   Quantity:  90 tablet   Refills:  0            Where to get your medicines      These medications were sent to [facility] [place] - [address]  [address] [facility] [address] [facility] [address]     Phone:  [phone]     baclofen 10 MG tablet    gabapentin 600 MG tablet                Primary Care Provider Office Phone # Fax #    Julius Hassan [phone]       [address] [address]        Goals        General    I will continue to work with home care until discharge goals are met. (pt-stated)     Notes - Note edited  10/28/2015  3:09 PM by Cordelia Moreland RN    As of today's date 9/22/2015 goal is met at 76 - 100%.   Goal Status:  Ongoing  As of today's date 10/28/2015 goal is met at 76 - 100%.   Goal Status:  Complete            Thank you!     Thank you for  choosing [facility]  for your care. Our goal is always to provide you with excellent care. Hearing back from our patients is one way we can continue to improve our services. Please take a few minutes to complete the written survey that you may receive in the mail after your visit with us. Thank you!             Your Updated Medication List - Protect others around you: Learn how to safely use, store and throw away your medicines at www.disposemymeds.org.          This list is accurate as of: 3/28/17  4:26 PM.  Always use your most recent med list.                   Brand Name Dispense Instructions for use    amitriptyline 100 MG tablet    ELAVIL    90 tablet    Take 1 tablet (100 mg) by mouth At Bedtime       ASPIRIN PO      Take 162 mg by mouth daily       ATIVAN 0.5 MG tablet   Generic drug:  LORazepam     60 tablet    Take one dose at bedtime. May have 2 other doses every 6 hours if needed for muscle spasms. Max of 3 tabs per day. Do not take if sleepy.       baclofen 10 MG tablet    LIORESAL    120 tablet    Take 1 tablet (10 mg) by mouth 3 times daily May take an additional 10 mg as needed qd for total of 40 mg per day       * DULOXETINE HCL PO      Take 30 mg by mouth every morning       * DULOXETINE HCL PO      Take 60 mg by mouth every evening       gabapentin 600 MG tablet    NEURONTIN    90 tablet    Take 1 tablet (600 mg) by mouth 3 times daily May take an additional 300 mg per day as needed       glatiramer 20 MG/ML injection    COPAXONE    30 each    Inject 1 mL (20 mg) Subcutaneous daily       lidocaine 5 % Patch    LIDODERM    30 patch    Apply 1  patches to low back and right buttock ( cut to fit)  area at once for up to 12 h within a 24 h period.  Remove after 12 hours.       losartan-hydrochlorothiazide 100-25 MG per tablet    HYZAAR    90 tablet    Take 1 tablet by mouth daily       metoprolol 50 MG 24 hr tablet    TOPROL-XL    90 tablet    Take 1 tablet (50 mg) by mouth daily        MULTI-VITAMIN PO      Take 1 tablet by mouth daily       omega-3 fatty acids 1200 MG capsule      Take 1 capsule by mouth daily.       oxyCODONE 15 MG IR tablet    ROXICODONE    120 tablet    Take 1/2 to 1 tablet every 4 hours as needed for pain; max of 4 tablet(s) /day. May dispense on/after 3/24/17 to start taking on/after 3/26/17. 30 day supply       VITAMIN D PO      Take 1 tablet by mouth daily 1000 IU daily       * Notice:  This list has 2 medication(s) that are the same as other medications prescribed for you. Read the directions carefully, and ask your doctor or other care provider to review them with you.

## 2017-03-28 NOTE — PROGRESS NOTES
Interval history :This a follow up examination  for Amanda Richardson who is an 49 year old female, who is  being seen in follow up for chronic pain.   Primary Care Provider:Julius Hassan MD.   in visit today  Last visit 02/16/17  Today's date: 03/28/2017    Chief complaints    Leg spasms worse baclofen pump insertion 4/12/17   Not taking Ativan, not helpful    Back pain to right gluteal with right leg pain, spasm worsening, seeing spine surgeon      Opioid analgesia Oxycodone 15 mg high tolerance, taking 4 tablet(s) per day     No home care or Physical Therapy, does not have PCP visit scheduled    Has appointment with Dr. Vigil neurologist for MS 5/16/17.    Current Pain:   Description: aching, burning, sharp, tender,shooting, spasms off and on all day long severity variable  Location: low back, right hip right gluteal, right leg L4-5 ,   Pain level: 7/10,  Average7/10, range 4-9/10  Quality  Worse at evening-night, continuous, exhausting, miserable, nagging, unbearable     Aggravating factors: inactivity, over doing   Relieving factors  Rest, medications, ice, stretching, TENS, meds ,ice lidoderm   Quality of life: mood depressed,.but better  Denies suicidal thoughts, but frustrated.   Activity level not much walking any more, struggles with self care. Sleep still poor, sleeps periods of 2 hours at a time waking with painful spasms with a total 4-6 hours per night or less, naps during the day.     Progress in Program:   Counseling prn, Physical Therapy: complete,  Self care daily TENS, theracane, ice,  exercise yes,walking relaxation yes, body awareness no.    PAST MEDICATION TRIALS:  Hydrocodone,Fentanyl, OxyContin, Lea, Tramadol, Ibuprofen, Naproxen, Flexeril, Tizanidine, Cymbalta, Lyrica, Fentanyl. 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     SURGICAL HISTORY OF -   3/14    Left L4-5 Epidural Dr Winter     SURGICAL HISTORY OF -   5/15    Right Bimalleolar ankle fx ORIF     Current Medications:   Current Outpatient Prescriptions   Medication     baclofen (LIORESAL) 10 MG tablet     gabapentin (NEURONTIN) 600 MG tablet     oxyCODONE (ROXICODONE) 15 MG IR tablet     ATIVAN 0.5 MG tablet     ASPIRIN PO     DULOXETINE HCL PO     DULOXETINE HCL PO     amitriptyline (ELAVIL) 100 MG tablet     losartan-hydrochlorothiazide (HYZAAR) 100-25 MG per tablet     metoprolol (TOPROL-XL) 50 MG 24 hr tablet     lidocaine (LIDODERM) 5 % patch     glatiramer (COPAXONE) 20 MG/ML injection     Cholecalciferol (VITAMIN D PO)     Multiple Vitamin (MULTI-VITAMIN PO)     omega-3 fatty acids (FISH OIL) 1200 MG capsule     [DISCONTINUED] gabapentin (NEURONTIN) 600 MG tablet      No current facility-administered medications for this visit.      ROS: twelve systems review negative except for:  cough  joint pain, insomnia, blood in urine, urgency, weakness, stiffness, spasm, stiffness, numbness/tingling, and depression     Physical Exam  Constitutional:Blood pressure 152/77, pulse 89, last menstrual period 12/11/2011, SpO2 92 %, not currently breastfeeding.  Psyche:  Fully oriented, Behavioral Observations: Eye contact good. Mood and spirits are  Depressed, but better   Working status no   Musculoskeletal exam: standing with support of exam table but mainly in w/c today. No spasms of right leg noted today at visit  Neuro exam:   Alert,  RONI @ 3 mm, Speech clear fluent and appropriate.  TURCIOS x 4,   Skin/Vascular/ Autonomic:  warm, dry and intact    Opioid risk:   DIRE Score for ongoing opioid management is calculated as follows:    Diagnosis = 3    Intractability = 2    Risk: Psych = 2  Chem Hlth = 2  Reliability = 1  Social = 2    Efficacy = 2    Total DIRE Score = 14 (14 or higher predicts good candidate for ongoing opioid management; 13 or lower predicts poor candidate for opioid management)   Daniel Corado 2006,The Journal of  Pain.,The DIRE Score: Predicting Outcomes of Opioid Prescribing for Chronic Pain Vol.7,No.9, pp 671-681.  MNPMP:  reviewed as expected without evidence of abuse, misuse or diversion.  Opioid tolerance moderate to high Morphine EQ 90  mg /day   Compliance: UDS 06/09/16 expected results, opioid agreement 06/09/16  Analgesia  very poor  Activity:little to none, Adverse events: none  Adherence: poor ( self regulates     Assessment:  1 Lumbar radiculopathy - right hip right leg  and low back  right hip bursitis piriformis syndrome in past    A. Chronic low back pain with bilateral radicular feature L2-3 on right and L3-4 on left. Reports increase low back radicular right leg pain.  Frequent spasms contributing     - Flexion based pain s/p interventional procedure  left L2-3 epidural  controlled     - unlikely there is epidural access problematic due to extent of fusion                -History of lumbar multi level lumbar a/p fusion L4-S1 with pelvic fixation Oct 2013 (procedure below)     Anterior spinal fusion, L4-L5. Insertion of intervertebral biomechanical device, L4-L5 (PEEK device). Anterior lumbar instrumentation, L4-L5, with screw fixation and anterior cover plate. Healy of local autograft bone fro5%m bony endplates.Posterior lumbar fusion, L4-L5 (bilateral intertransverse technique) Posterolateral fusion, L5-S1 (bilateral intertransverse to sacral ala technique). Bilateral decompression, L4-L5.L5-S1. Segmental pedicle screw instrumentation, L4-S1. Bilateral pelvic screw instrumentation with connection to pedicle screws.  2.  Multiple sclerosis-  Spasms better   -  Follow up with Dr. Vigil    -Immobility and isolation problematic.     Patient has Resource given in past for national MS society at last visit  www.nationalmssociety.org/Resources- consider  service and see if MS resources can be available  for this          3. Gait and balance disorder  fall with subsequent injury post ORIF X2 right ankle   Monitor medication effects to reduce falls risk   Walks minimally       4. Mood disorder: depression symptoms ( isolation , increase pain and spasms, relationship stress)   5.   Insomnia Non restorative sleep with frequent awakening due to pain and non pain sources, self report of snoring leg jerks onset 11 pm    Plan:  Amitriptyline 100 mg     6. Encounter for continuous use of opioids:    24 opioid total per day  mg per day Oxycodone 60 mg  , Morphine  EQ  90 mg per day   Concerns with Self medicates, compliant since last visit   Narcan for home use        PROCEDURES:  None   Opioid management will continue with [facility]     Future appointment/ Follow up :  2-3 months with Saba Worley CNP. Patient to contact clinic  if you need  further dose adjustment on the baclofen   Neurology follow up with Dr. Vigil is scheduled  Baclofen insertion 4/12  Follow up with spine surgeon  Medications  Oxycodone 15 mg one tablet(s) every 4 hr as needed for pain limit 4 per day.  Next start date 4/25  Increase Gabapentin by 300 mg per day as needed for total daily dose of 1200 mg - 1500 mg per day   May take an additional 10 mg baclofen as needed per day for total of 40 mg/day .    Other:  Narcan nasal spray     Time spent: 20  minutes including  5 minutes interval history, assessment and medication review/adjustments. 10 minutes for counseling, treatment options, and coordinating care for the above identified medical problem.     Signed: Saba Wolrey RN, BC, C.A.N.P.  [facility]

## 2017-03-28 NOTE — PATIENT INSTRUCTIONS
Opioid management will continue with [facility]     Future appointment/ Follow up :  2-3 months with Saba Worley CNP. Contact me if you need further dose adjustment on thebaclofen   Neurology follow up with Dr. Vigil is scheduled  Baclofen insertion 4/12  Follow up with spine surgeon  Medications  Oxycodone 15 mg one tablet(s) every 4 hr as needed for pain limit 4 per day.Next start date 4/25  Increase Gabapentin by 300 mg per day as needed for total daily dose of 1200 mg - 1500 mg per day   May take an additional 10 mg baclofen as needed per day.    Other:  Narcan nasal spray    ----------------------------------------------------------------  Nurse Triage line:  [phone]   Call this number with any questions or concerns. You may leave a detailed message anytime. Calls are typically returned Monday through Friday between 8 AM and 4:30 PM. We usually get back to you within 2 business days depending on the issue/request.       Medication refills:    For non-narcotic medications, call your pharmacy directly to request a refill. The pharmacy will contact the [facility] Management Center for authorization. Please allow 3-4 days for these refills to be processed.     For narcotic refills, call the nurse triage line or send a [employer]TALK message. Please contact us 7-10 days before your refill is due. The message MUST include the name of the specific medication(s) requested and how you would like to receive the prescription(s). The options are as follows:    Pain Clinic staff can mail the prescription to your pharmacy. Please tell us the name of the pharmacy.    You may pick the prescription up at the Pain Clinic (tell us the location) or during a clinic visit with your pain provider    Pain Clinic staff can deliver the prescription to the [facility] pharmacy in the clinic building. Please tell us the location.      Scheduling number: [phone].  Call this number to schedule or change  appointments.    We believe regular attendance is key to your success in our program.    Any time you are unable to keep your appointment we ask that you call us at least 24 hours in advance to let us know. This will allow us to offer the appointment time to another patient.

## 2017-03-28 NOTE — NURSING NOTE
"Chief Complaint   Patient presents with     Pain     Follow up       Initial /77  Pulse 89  LMP 12/11/2011  SpO2 92% Estimated body mass index is 35.44 kg/(m^2) as calculated from the following:    Height as of 3/3/17: 1.753 m (5' 9\").    Weight as of 3/3/17: 108.9 kg (240 lb).  Medication Reconciliation: roxanne Nielsen [facility]        "

## 2017-04-10 ENCOUNTER — OFFICE VISIT (OUTPATIENT)
Dept: FAMILY MEDICINE | Facility: CLINIC | Age: 52
End: 2017-04-10
Payer: COMMERCIAL

## 2017-04-10 VITALS
OXYGEN SATURATION: 96 % | BODY MASS INDEX: 35.55 KG/M2 | WEIGHT: 240 LBS | HEIGHT: 69 IN | SYSTOLIC BLOOD PRESSURE: 134 MMHG | TEMPERATURE: 96.9 F | DIASTOLIC BLOOD PRESSURE: 84 MMHG | HEART RATE: 86 BPM

## 2017-04-10 DIAGNOSIS — Z01.810 PRE-OPERATIVE CARDIOVASCULAR EXAMINATION: Primary | ICD-10-CM

## 2017-04-10 DIAGNOSIS — N30.00 ACUTE CYSTITIS WITHOUT HEMATURIA: ICD-10-CM

## 2017-04-10 DIAGNOSIS — R82.90 NONSPECIFIC FINDING ON EXAMINATION OF URINE: ICD-10-CM

## 2017-04-10 DIAGNOSIS — Z01.818 PREOP GENERAL PHYSICAL EXAM: ICD-10-CM

## 2017-04-10 DIAGNOSIS — R26.9 ABNORMALITY OF GAIT: ICD-10-CM

## 2017-04-10 DIAGNOSIS — G35 MS (MULTIPLE SCLEROSIS) (H): ICD-10-CM

## 2017-04-10 DIAGNOSIS — M48.061 SPINAL STENOSIS, LUMBAR: ICD-10-CM

## 2017-04-10 DIAGNOSIS — F32.A MODERATE DEPRESSIVE EPISODE: ICD-10-CM

## 2017-04-10 DIAGNOSIS — I10 HYPERTENSION GOAL BP (BLOOD PRESSURE) < 140/90: ICD-10-CM

## 2017-04-10 DIAGNOSIS — C85.81 OTHER SPECIFIED TYPE OF NON-HODGKIN LYMPHOMA OF HEAD (H): ICD-10-CM

## 2017-04-10 DIAGNOSIS — G89.4 CHRONIC PAIN SYNDROME: Chronic | ICD-10-CM

## 2017-04-10 LAB
ALBUMIN UR-MCNC: 30 MG/DL
APPEARANCE UR: ABNORMAL
BACTERIA #/AREA URNS HPF: ABNORMAL /HPF
BILIRUB UR QL STRIP: NEGATIVE
COLOR UR AUTO: YELLOW
ERYTHROCYTE [DISTWIDTH] IN BLOOD BY AUTOMATED COUNT: 15 % (ref 10–15)
GLUCOSE UR STRIP-MCNC: NEGATIVE MG/DL
HCT VFR BLD AUTO: 43 % (ref 35–47)
HGB BLD-MCNC: 14 G/DL (ref 11.7–15.7)
HGB UR QL STRIP: ABNORMAL
KETONES UR STRIP-MCNC: NEGATIVE MG/DL
LEUKOCYTE ESTERASE UR QL STRIP: NEGATIVE
MCH RBC QN AUTO: 26.8 PG (ref 26.5–33)
MCHC RBC AUTO-ENTMCNC: 32.6 G/DL (ref 31.5–36.5)
MCV RBC AUTO: 82 FL (ref 78–100)
NITRATE UR QL: POSITIVE
NON-SQ EPI CELLS #/AREA URNS LPF: ABNORMAL /LPF
PH UR STRIP: 5.5 PH (ref 5–7)
PLATELET # BLD AUTO: 313 10E9/L (ref 150–450)
RBC # BLD AUTO: 5.23 10E12/L (ref 3.8–5.2)
RBC #/AREA URNS AUTO: ABNORMAL /HPF (ref 0–2)
SP GR UR STRIP: 1.02 (ref 1–1.03)
URN SPEC COLLECT METH UR: ABNORMAL
UROBILINOGEN UR STRIP-ACNC: 0.2 EU/DL (ref 0.2–1)
WBC # BLD AUTO: 15.1 10E9/L (ref 4–11)
WBC #/AREA URNS AUTO: ABNORMAL /HPF (ref 0–2)

## 2017-04-10 PROCEDURE — 87086 URINE CULTURE/COLONY COUNT: CPT | Performed by: FAMILY MEDICINE

## 2017-04-10 PROCEDURE — 85027 COMPLETE CBC AUTOMATED: CPT | Performed by: FAMILY MEDICINE

## 2017-04-10 PROCEDURE — 36415 COLL VENOUS BLD VENIPUNCTURE: CPT | Performed by: FAMILY MEDICINE

## 2017-04-10 PROCEDURE — 87088 URINE BACTERIA CULTURE: CPT | Performed by: FAMILY MEDICINE

## 2017-04-10 PROCEDURE — 99214 OFFICE O/P EST MOD 30 MIN: CPT | Performed by: PHYSICIAN ASSISTANT

## 2017-04-10 PROCEDURE — 81001 URINALYSIS AUTO W/SCOPE: CPT | Performed by: FAMILY MEDICINE

## 2017-04-10 PROCEDURE — 80053 COMPREHEN METABOLIC PANEL: CPT | Performed by: FAMILY MEDICINE

## 2017-04-10 PROCEDURE — 87186 SC STD MICRODIL/AGAR DIL: CPT | Performed by: FAMILY MEDICINE

## 2017-04-10 PROCEDURE — 93000 ELECTROCARDIOGRAM COMPLETE: CPT | Performed by: PHYSICIAN ASSISTANT

## 2017-04-10 NOTE — NURSING NOTE
"Chief Complaint   Patient presents with     Pre-Op Exam       Initial /84  Pulse 86  Temp 96.9  F (36.1  C) (Tympanic)  Ht 5' 9\" (1.753 m)  Wt 240 lb (108.9 kg)  LMP 12/11/2011  SpO2 96%  BMI 35.44 kg/m2 Estimated body mass index is 35.44 kg/(m^2) as calculated from the following:    Height as of this encounter: 5' 9\" (1.753 m).    Weight as of this encounter: 240 lb (108.9 kg).  Medication Reconciliation: complete   Gabriela Buckner, CARLOS      "

## 2017-04-10 NOTE — PROGRESS NOTES
[address] [address]  [phone]  Dept: [phone]    PRE-OP EVALUATION:  Today's date: 4/10/2017    Amanda Richardson (: 1965) presents for pre-operative evaluation assessment as requested by Dr. Coyle.  She requires evaluation and anesthesia risk assessment prior to undergoing surgery/procedure for treatment of spinal cord injury secondary to complications from MS and PMH significant for spinal stenosis.  Proposed procedure: baclophen pump    Date of Surgery/ Procedure: 2017  Time of Surgery/ Procedure: [facility]  Hospital/Surgical Facility: [facility]   Fax number for surgical facility: [phone]  Primary Physician: Julius Hassan  Type of Anesthesia Anticipated: to be determined    Patient has a Health Care Directive or Living Will:  YES     1. NO - Do you have a history of heart attack, stroke, stent, bypass or surgery on an artery in the head, neck, heart or legs?  2. NO - Do you ever have any pain or discomfort in your chest?  3. NO - Do you have a history of  Heart Failure?  4. NO - Are you troubled by shortness of breath when: walking on the level, up a slight hill or at night?  5. NO - Do you currently have a cold, bronchitis or other respiratory infection?  6. YES - DO YOU HAVE A COUGH, SHORTNESS OF BREATH OR WHEEZING? Secondary to tobacco abuse, weight, and difficult mobility due to chronic lower extremity pains complicated by back pain and MS  7. YES - DO YOU SOMETIMES GET PAINS IN THE CALVES OF YOUR LEGS WHEN YOU WALK? Secondary to complications from MS  8. NO - Do you or anyone in your family have previous history of blood clots?  9. NO - Do you or does anyone in your family have a serious bleeding problem such as prolonged bleeding following surgeries or cuts?  10. NO - Have you ever had problems with anemia or been told to take iron pills?  11. NO - Have you had any abnormal blood loss such as black, tarry or  bloody stools, or abnormal vaginal bleeding?  12. NO - Have you ever had a blood transfusion?  13. NO - Have you or any of your relatives ever had problems with anesthesia?  14. YES - DO YOU HAVE SLEEP APNEA, EXCESSIVE SNORING OR DAYTIME DROWSINESS? Drowsiness due to MS  15. NO - Do you have any prosthetic heart valves?  16. NO - Do you have prosthetic joints?  17. NO - Is there any chance that you may be pregnant?      HPI:                                                      Brief HPI related to upcoming procedure: Amanda presents to clinic today in wheelchair with partner for pre-op of upcoming placement of baclofen pump. She has PMH significant for spasticity primarily is bilateral lower extremities related to her MS. She recently saw neurology for evaluation of baclofen pump placement and catheterization on 3/16/17 at [facility]. She has previously underwent anterior-posterior lumbar fusion from L4-S1 under Dr. Brewer. Per patient she believes need for pump placement is due to progressive problems related to lumbar fusion. She has historically tried multiple muscle relaxants and narcotics with little management of symptoms. She is currently taking gabapentin, oxycodone, and oral baclofen daily as prescribed.     Depression/ Anxiety:  Patient is currently taking duloxetine, amitriptyline, and ativan as prescribed to manage PMH significant for depression and anxiety. She is currently being managed by the pain clinic for her medication. Reports she is doing well. Denies side effects from medications.     PHQ-9 SCORE 3/5/2014 5/3/2016 3/3/2017   Total Score 15 - -   Total Score - 12 9     REJI-7 SCORE 5/3/2016 3/3/2017   Total Score 2 0        PHQ-9  English      PHQ-9   Any Language     GAD7       Hypertension:  Amanda is currently taking metoprolol, losartan-hydrochlorothiazide, and OTC aspirin daily as prescribed. Reports medication is working well for her and BP is well controled. Denies side effects  from medications.     BP Readings from Last 6 Encounters:   04/10/17 134/84   03/28/17 152/77   03/03/17 124/66   02/16/17 127/74   01/25/17 120/42   01/19/17 132/79       The patient presents otherwise in good health, denies any current chest pain, heart palpitations, or other cardiac concerns at this time. She also denies any current UTI symptoms - last UTI per patient she did not experience any symptoms.     Of mention patient does have PMH significant for tobacco abuse. She denies current tobacco, elicit drug use, or alcohol use at this time.    She does take OTC vitamin D, multivitamin supplement, and fish oil supplement daily.    See problem list for active medical problems.  Problems all longstanding and stable, except as noted/documented.  See ROS for pertinent symptoms related to these conditions.                                                                                                  .    MEDICAL HISTORY:                                                      Patient Active Problem List    Diagnosis Date Noted     Pain medication agreement- signed Nov 20,2012 11/23/2012     Priority: High     For pain flares-Vicodin # 60 monthly  She is also on baclofen TID.   reviewed 5/2/16       Chronic pain syndrome 02/22/2017     Priority: Medium     Patient is followed by Saba Worley CNP   for ongoing prescription of pain medication.  All refills should only be approved by this provider, or covering partner.    Medication(s): Oxycodone 15 mg .   Maximum quantity per month: 120  Clinic visit frequency required: Q 2 months     Controlled substance agreement:  Encounter-Level CSA - 06/09/2016:                 Controlled Substance Agreement - Scan on 6/17/2016 10:49 AM : CONTROLLED SUBSTANCE AGREEMENT (below)          Encounter-Level CSA - 06/09/2016:                 Controlled Substance Agreement - Scan on 4/12/2015 10:28 AM : Controlled Medication Agreement 04/03/15 (below)            Pain Clinic  evaluation in the past: Yes       Date/Location:   [facility]  Current patient    DIRE Total Score(s): 14  No flowsheet data found.    Last [facility] website verification:  done on 02/17/16 and each pain visit    [email or web address]             Spasm 01/22/2017     Priority: Medium     Leg muscle spasm 01/21/2017     Priority: Medium     Prediabetes      Priority: Medium     Moderate depressive episode (H)      Priority: Medium     Health Care Home 08/25/2015     Priority: Medium       Status:  Home Care  Care Coordinator:  JENNY REYNOLDS    See Letters for [facility] Care Plan  Date:  August 25, 2015           Encounter for long-term current use of medication 05/01/2015     Priority: Medium     Problem list name updated by automated process. Provider to review       Neuralgia, neuritis, and radiculitis, unspecified 04/29/2015     Priority: Medium     Colon polyps      Priority: Medium     tubular adenomas x 2       Lumbar radiculopathy 10/17/2013     Priority: Medium     CT 11/11/3 L 4 superior L4 pedicle screw enters L3-4 disc space. Remaining hardware intact.   No recurrent stenosis at post operative levels with residual chronic stenosis R>L L5-S1.  Healing transverse fracture L4. Chronic anterior wedging of L 4.  Questionable graft subsiderul vs chronic compression Fx of L 4.   11/126/13 L5 nerve block # 1pain per procedure 9 post procedure 8.   Saba Worley R.N., B.C.,C.A.N.P  [facility]           Ex-smoker 08/08/2013     Priority: Medium     Nonallopathic lesion of thoracic region 09/24/2012     Priority: Medium     Problem list name updated by automated process. Provider to review       Nonallopathic lesion of cervical region 09/24/2012     Priority: Medium     Problem list name updated by automated process. Provider to review       Abnormality of gait 07/27/2012     Priority: Medium     Lumbago 07/20/2012     Priority: Medium     Treated with epidural Aug  2012       Spinal stenosis, lumbar 06/17/2012     Priority: Medium     10/17/14 anterior posterior fusion with decompression L4-S1 with pelvic fixation.               MS (multiple sclerosis) (H) 06/11/2012     Priority: Medium     Dis ab- Dec 2011- permanent dis ab         Non Hodgkin's lymphoma (H) 06/11/2012     Priority: Medium     Obesity 06/11/2012     Priority: Medium     Leukocytosis 06/11/2012     Priority: Medium     With normal bone marrow testing.         Gallstone 06/11/2012     Priority: Medium     Incidental finding on CT scan        Hypertension goal BP (blood pressure) < 140/90 06/11/2012     Priority: Medium     CARDIOVASCULAR SCREENING; LDL GOAL LESS THAN 160 06/11/2012     Priority: Medium      Past Medical History:   Diagnosis Date     Abnormality of gait 7/27/2012     Chronic pain     [facility]     Colon polyps 1/15    tubular adenomas x 2     Gallstone 6/11/2012     Hypertension goal BP (blood pressure) < 140/90      Leukocytosis 6/11/2012     Moderate depressive episode (H)      MS (multiple sclerosis) (H) 2003    Dr Vigil, Dr Green     Non Hodgkin's lymphoma (H) 06/11/2012    Dr Erickson  [facility] - T cell     Nonallopathic lesion of cervical region, not elsewhere classified 9/24/2012     Nonallopathic lesion of thoracic region, not elsewhere classified 9/24/2012     Numbness and tingling     From MS Feet, hands and around the waist line.     Obesity 6/11/2012     Pain in joint, pelvic region and thigh 7/20/2012     Prediabetes      Spinal stenosis, lumbar 6/17/2012     Tobacco abuse 06/11/2012    former     Past Surgical History:   Procedure Laterality Date     ------------OTHER-------------  2015    Right ankle I&D d/t infection     COLONOSCOPY N/A 1/7/2015    tubular adenomas x 2 - due 5 yrs     FUSION LUMBAR ANTERIOR, FUSION LUMBAR POSTERIOR TWO LEVELS, COMBINED  10/17/2013    lumbar fusion - Dr Floyd     OPEN REDUCTION INTERNAL FIXATION ANKLE  5/15    Right  Bimalleolar ankle fx ORIF     OPEN REDUCTION INTERNAL FIXATION ANKLE Right 11/2015    Revision due to spasms pulling screws out of ankle     SURGICAL HISTORY OF -   2011    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     SURGICAL HISTORY OF -   3/14    Left L4-5 Epidural Dr Winter     Current Outpatient Prescriptions   Medication Sig Dispense Refill     cephALEXin (KEFLEX) 500 MG capsule Take 1 capsule (500 mg) by mouth 2 times daily for 7 days 14 capsule 0     baclofen (LIORESAL) 10 MG tablet Take 1 tablet (10 mg) by mouth 3 times daily May take an additional 10 mg as needed qd for total of 40 mg per day 120 tablet 1     gabapentin (NEURONTIN) 600 MG tablet Take 1 tablet (600 mg) by mouth 3 times daily May take an additional 300 mg per day as needed 90 tablet 0     oxyCODONE (ROXICODONE) 15 MG IR tablet Take 1/2 to 1 tablet every 4 hours as needed for pain; max of 4 tablet(s) /day. May dispense on/after 3/24/17 to start taking on/after 3/26/17. 30 day supply 120 tablet 0     ATIVAN 0.5 MG tablet Take one dose at bedtime. May have 2 other doses every 6 hours if needed for muscle spasms. Max of 3 tabs per day. Do not take if sleepy. 60 tablet 0     ASPIRIN PO Take 162 mg by mouth daily       DULOXETINE HCL PO Take 30 mg by mouth every morning       DULOXETINE HCL PO Take 60 mg by mouth every evening       amitriptyline (ELAVIL) 100 MG tablet Take 1 tablet (100 mg) by mouth At Bedtime 90 tablet 3     losartan-hydrochlorothiazide (HYZAAR) 100-25 MG per tablet Take 1 tablet by mouth daily 90 tablet 3     metoprolol (TOPROL-XL) 50 MG 24 hr tablet Take 1 tablet (50 mg) by mouth daily 90 tablet 3     lidocaine (LIDODERM) 5 % patch Apply 1  patches to low back and right buttock ( cut to fit)  area at once for up to 12 h within a 24 h period.  Remove after 12 hours. 30 patch 6     glatiramer (COPAXONE) 20 MG/ML injection Inject 1 mL (20 mg) Subcutaneous daily 30 each 0     Cholecalciferol (VITAMIN D PO) Take 1  "tablet by mouth daily 1000 IU daily       Multiple Vitamin (MULTI-VITAMIN PO) Take 1 tablet by mouth daily        omega-3 fatty acids (FISH OIL) 1200 MG capsule Take 1 capsule by mouth daily.       OTC products: None, except as noted above    Allergies   Allergen Reactions     Lisinopril      Lip swelling     Flexeril [Cyclobenzaprine Hcl]      Got confused       Latex Allergy: NO    Social History   Substance Use Topics     Smoking status: Former Smoker     Types: Cigarettes     Quit date: 8/4/2013     Smokeless tobacco: Never Used     Alcohol use No     History   Drug Use No       REVIEW OF SYSTEMS:                                                    C: NEGATIVE for fever, chills, change in weight  I: NEGATIVE for worrisome rashes, moles or lesions  E: NEGATIVE for vision changes or irritation  E/M: NEGATIVE for ear, mouth and throat problems  R: NEGATIVE for significant cough or SOB  B: NEGATIVE for masses, tenderness or discharge  CV: NEGATIVE for chest pain, palpitations or peripheral edema  GI: NEGATIVE for nausea, abdominal pain, heartburn, or change in bowel habits  : NEGATIVE for frequency, dysuria, or hematuria  M: NEGATIVE for significant arthralgias or myalgia  N: NEGATIVE for weakness, dizziness or paresthesias  E: NEGATIVE for temperature intolerance, skin/hair changes  H: NEGATIVE for bleeding problems  P: NEGATIVE for changes in mood or affect    This document serves as a record of the services and decisions personally performed and made by Yanna Dugan PA-C. It was created on her behalf by Coby Givens, a trained medical scribe. The creation of this document is based the provider's statements to the medical scribe.  Coby Givens, April 10, 2017 5:53 PM    EXAM:                                                    /84  Pulse 86  Temp 96.9  F (36.1  C) (Tympanic)  Ht 5' 9\" (1.753 m)  Wt 240 lb (108.9 kg)  LMP 12/11/2011  SpO2 96%  BMI 35.44 kg/m2       GENERAL APPEARANCE: healthy, alert " and no distress     EYES: EOMI, PERRL     HENT: ear canals and TM's normal and nose and mouth without ulcers or lesions     NECK: no adenopathy, no asymmetry, masses, or scars and thyroid normal to palpation     RESP: lungs clear to auscultation - no rales, rhonchi or wheezes     CV: regular rates and rhythm, normal S1 S2, no S3 or S4 and no murmur, click or rub     ABDOMEN:  soft, nontender, no HSM or masses and bowel sounds normal     MS: extremities normal- no gross deformities noted, no evidence of inflammation in joints, FROM in all extremities.     SKIN: no suspicious lesions or rashes     NEURO: Normal strength and tone, sensory exam grossly normal, mentation intact and speech normal     PSYCH: mentation appears normal. and affect normal/bright     LYMPHATICS: No axillary, cervical, or supraclavicular nodes    DIAGNOSTICS:                                                      EKG: poor R wave progression otherwise w/o significant change from November 2016    Labs Drawn and in Process:     Results for orders placed or performed in visit on 04/10/17   CBC with platelets   Result Value Ref Range    WBC 15.1 (H) 4.0 - 11.0 10e9/L    RBC Count 5.23 (H) 3.8 - 5.2 10e12/L    Hemoglobin 14.0 11.7 - 15.7 g/dL    Hematocrit 43.0 35.0 - 47.0 %    MCV 82 78 - 100 fl    MCH 26.8 26.5 - 33.0 pg    MCHC 32.6 31.5 - 36.5 g/dL    RDW 15.0 10.0 - 15.0 %    Platelet Count 313 150 - 450 10e9/L   Comprehensive metabolic panel   Result Value Ref Range    Sodium 139 133 - 144 mmol/L    Potassium 3.7 3.4 - 5.3 mmol/L    Chloride 102 94 - 109 mmol/L    Carbon Dioxide 28 20 - 32 mmol/L    Anion Gap 9 3 - 14 mmol/L    Glucose 159 (H) 70 - 99 mg/dL    Urea Nitrogen 20 7 - 30 mg/dL    Creatinine 0.48 (L) 0.52 - 1.04 mg/dL    GFR Estimate >90  Non  GFR Calc   >60 mL/min/1.7m2    GFR Estimate If Black >90   GFR Calc   >60 mL/min/1.7m2    Calcium 8.6 8.5 - 10.1 mg/dL    Bilirubin Total 0.4 0.2 - 1.3 mg/dL     Albumin 3.2 (L) 3.4 - 5.0 g/dL    Protein Total 7.3 6.8 - 8.8 g/dL    Alkaline Phosphatase 91 40 - 150 U/L    ALT 27 0 - 50 U/L    AST 13 0 - 45 U/L   UA reflex to Microscopic and Culture   Result Value Ref Range    Color Urine Yellow     Appearance Urine Cloudy     Glucose Urine Negative NEG mg/dL    Bilirubin Urine Negative NEG    Ketones Urine Negative NEG mg/dL    Specific Gravity Urine 1.025 1.003 - 1.035    Blood Urine Moderate (A) NEG    pH Urine 5.5 5.0 - 7.0 pH    Protein Albumin Urine 30 (A) NEG mg/dL    Urobilinogen Urine 0.2 0.2 - 1.0 EU/dL    Nitrite Urine Positive (A) NEG    Leukocyte Esterase Urine Negative NEG    Source Midstream Urine    Urine Microscopic   Result Value Ref Range    WBC Urine 5-10 (A) 0 - 2 /HPF    RBC Urine 2-5 (A) 0 - 2 /HPF    Squamous Epithelial /LPF Urine Many (A) FEW /LPF    Bacteria Urine Many (A) NEG /HPF   Urine Culture Aerobic Bacterial   Result Value Ref Range    Specimen Description Midstream Urine     Culture Micro (A)      >100,000 colonies/mL Lactose fermenting gram negative rods Susceptibility testing   in progress      Micro Report Status Pending        Unresulted Labs Ordered in the Past 30 Days of this Admission     Date and Time Order Name Status Description    4/10/2017 1634 URINE CULTURE AEROBIC BACTERIAL Preliminary           Recent Labs   Lab Test  01/23/17   2349  01/21/17   0120  05/02/16   1613   10/03/13   1614   HGB   --   13.1  13.2   < >  12.7   PLT  404  325  348   < >  318   INR   --    --    --    --   0.93   NA   --   139  140   < >  140   POTASSIUM   --   3.5  3.7   < >  3.7   CR   --   0.44*  0.54   < >  0.52   A1C   --    --   6.6*   --    --     < > = values in this interval not displayed.        IMPRESSION:                                                    Reason for surgery/procedure: lower extremity spasming  Diagnosis/reason for consult: pre-op    The proposed surgical procedure is considered INTERMEDIATE risk.    REVISED CARDIAC RISK  INDEX  The patient has the following serious cardiovascular risks for perioperative complications such as (MI, PE, VFib and 3  AV Block):  No serious cardiac risks  INTERPRETATION: 0 risks: Class I (very low risk - 0.4% complication rate)    The patient has the following additional risks for perioperative complications:  No identified additional risks      ICD-10-CM    1. Pre-operative cardiovascular examination Z01.810 CBC with platelets     Comprehensive metabolic panel     UA reflex to Microscopic and Culture     EKG 12-lead complete w/read - Clinics     Urine Microscopic   2. Preop general physical exam Z01.818    3. Chronic pain syndrome G89.4    4. MS (multiple sclerosis) (H) G35    5. 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 Moderate depressive episode (H) F32.1    7. Hypertension goal BP (blood pressure) < 140/90 I10    8. Spinal stenosis, lumbar M48.06    9. Abnormality of gait R26.9    10. Nonspecific finding on examination of urine R82.90 Urine Culture Aerobic Bacterial   11. Acute cystitis without hematuria N30.00 cephALEXin (KEFLEX) 500 MG capsule       RECOMMENDATIONS:                                                      --Patient is to take all scheduled medications on the day of surgery.  Hold fish oil and aspirin until after procedure.    APPROVAL GIVEN to proceed with proposed procedure, without further diagnostic evaluation    For UTI started patient on Keflex 500 mg BID     The information in this document, created by the medical scribe for me, accurately reflects the services I personally performed and the decisions made by me. I have reviewed and approved this document for accuracy prior to leaving the patient care area .    Yanna Dugan PA-C April 10, 2017 5:53 PM    Signed Electronically by           Julius Hassan MD, FAA[address]  [phone] Fax      Copy of this  evaluation report is provided to requesting physician.    [facility] Preop Guidelines

## 2017-04-10 NOTE — MR AVS SNAPSHOT
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 This includes signs of a cold or flu (such as a sore throat, runny nose, cough, rash or fever).    Do not smoke, drink alcohol or take over the counter medicine (unless your surgeon or primary care doctor tells you to) for the 24 hours before and after surgery.    If you take prescribed drugs: Follow your doctor s orders about which medicines to take and which to stop until after surgery.    Eating and drinking prior to surgery: follow the instructions from your surgeon    Take a shower or bath the night before surgery. Use the soap your surgeon gave you to gently clean your skin. If you do not have soap from your surgeon, use your regular soap. Do not shave or scrub the surgery site.  Wear clean pajamas and have clean sheets on your bed.         Follow-ups after your visit        Who to contact     If you have questions or need follow up information about today's clinic visit or your schedule please contact [facility] directly at [phone].  Normal or non-critical lab and imaging results will be communicated to you by MyChart, letter or  "phone within 4 business days after the clinic has received the results. If you do not hear from us within 7 days, please contact the clinic through XVionics or phone. If you have a critical or abnormal lab result, we will notify you by phone as soon as possible.  Submit refill requests through XVionics or call your pharmacy and they will forward the refill request to us. Please allow 3 business days for your refill to be completed.          Additional Information About Your Visit        CapseoharBuzzDash Information     XVionics gives you secure access to your electronic health record. If you see a primary care provider, you can also send messages to your care team and make appointments. If you have questions, please call your primary care clinic.  If you do not have a primary care provider, please call [phone] and they will assist you.        Care EveryWhere ID     This is your Care EveryWhere ID. This could be used by other organizations to access your [facility] medical records  [id number]        Your Vitals Were     Pulse Temperature Height Last Period Pulse Oximetry BMI (Body Mass Index)    86 96.9  F (36.1  C) (Tympanic) 5' 9\" (1.753 m) 12/11/2011 96% 35.44 kg/m2       Blood Pressure from Last 3 Encounters:   04/10/17 134/84   03/28/17 152/77   03/03/17 124/66    Weight from Last 3 Encounters:   04/10/17 240 lb (108.9 kg)   03/03/17 240 lb (108.9 kg)   02/16/17 240 lb (108.9 kg)              We Performed the Following     CBC with platelets     Comprehensive metabolic panel     EKG 12-lead complete w/read - Clinics     UA reflex to Microscopic and Culture     Urine Culture Aerobic Bacterial     Urine Microscopic          Today's Medication Changes          These changes are accurate as of: 4/10/17 11:59 PM.  If you have any questions, ask your nurse or doctor.               Start taking these medicines.        Dose/Directions    cephALEXin 500 MG capsule   Commonly known as:  KEFLEX   Used for:  Acute cystitis without " hematuria   Started by:  Yanna Dugan PA-C        Dose:  500 mg   Take 1 capsule (500 mg) by mouth 2 times daily for 7 days   Quantity:  14 capsule   Refills:  0            Where to get your medicines      These medications were sent to [id number] - [place] - [address]  [address] [address]     Phone:  [phone]     cephALEXin 500 MG capsule                Primary Care Provider Office Phone # Fax #    Julius Hassan [phone]       [facility] [address] [facility] [address]        Goals        General    I will continue to work with home care until discharge goals are met. (pt-stated)     Notes - Note edited  10/28/2015  3:09 PM by Cordelia Moreland RN    As of today's date 9/22/2015 goal is met at 76 - 100%.   Goal Status:  Ongoing  As of today's date 10/28/2015 goal is met at 76 - 100%.   Goal Status:  Complete            Thank you!     Thank you for choosing [facility]  for your care. Our goal is always to provide you with excellent care. Hearing back from our patients is one way we can continue to improve our services. Please take a few minutes to complete the written survey that you may receive in the mail after your visit with us. Thank you!             Your Updated Medication List - Protect others around you: Learn how to safely use, store and throw away your medicines at www.disposemymeds.org.          This list is accurate as of: 4/10/17 11:59 PM.  Always use your most recent med list.                   Brand Name Dispense Instructions for use    amitriptyline 100 MG tablet    ELAVIL    90 tablet    Take 1 tablet (100 mg) by mouth At Bedtime       ASPIRIN PO      Take 162 mg by mouth daily       ATIVAN 0.5 MG tablet   Generic drug:  LORazepam     60 tablet    Take one dose at bedtime. May have 2 other doses every 6 hours if needed for muscle spasms. Max of 3 tabs per day. Do not take  if sleepy.       baclofen 10 MG tablet    LIORESAL    120 tablet    Take 1 tablet (10 mg) by mouth 3 times daily May take an additional 10 mg as needed qd for total of 40 mg per day       cephALEXin 500 MG capsule    KEFLEX    14 capsule    Take 1 capsule (500 mg) by mouth 2 times daily for 7 days       * DULOXETINE HCL PO      Take 30 mg by mouth every morning       * DULOXETINE HCL PO      Take 60 mg by mouth every evening       gabapentin 600 MG tablet    NEURONTIN    90 tablet    Take 1 tablet (600 mg) by mouth 3 times daily May take an additional 300 mg per day as needed       glatiramer 20 MG/ML injection    COPAXONE    30 each    Inject 1 mL (20 mg) Subcutaneous daily       lidocaine 5 % Patch    LIDODERM    30 patch    Apply 1  patches to low back and right buttock ( cut to fit)  area at once for up to 12 h within a 24 h period.  Remove after 12 hours.       losartan-hydrochlorothiazide 100-25 MG per tablet    HYZAAR    90 tablet    Take 1 tablet by mouth daily       metoprolol 50 MG 24 hr tablet    TOPROL-XL    90 tablet    Take 1 tablet (50 mg) by mouth daily       MULTI-VITAMIN PO      Take 1 tablet by mouth daily       omega-3 fatty acids 1200 MG capsule      Take 1 capsule by mouth daily.       oxyCODONE 15 MG IR tablet    ROXICODONE    120 tablet    Take 1/2 to 1 tablet every 4 hours as needed for pain; max of 4 tablet(s) /day. May dispense on/after 3/24/17 to start taking on/after 3/26/17. 30 day supply       VITAMIN D PO      Take 1 tablet by mouth daily 1000 IU daily       * Notice:  This list has 2 medication(s) that are the same as other medications prescribed for you. Read the directions carefully, and ask your doctor or other care provider to review them with you.

## 2017-04-11 LAB
ALBUMIN SERPL-MCNC: 3.2 G/DL (ref 3.4–5)
ALP SERPL-CCNC: 91 U/L (ref 40–150)
ALT SERPL W P-5'-P-CCNC: 27 U/L (ref 0–50)
ANION GAP SERPL CALCULATED.3IONS-SCNC: 9 MMOL/L (ref 3–14)
AST SERPL W P-5'-P-CCNC: 13 U/L (ref 0–45)
BILIRUB SERPL-MCNC: 0.4 MG/DL (ref 0.2–1.3)
BUN SERPL-MCNC: 20 MG/DL (ref 7–30)
CALCIUM SERPL-MCNC: 8.6 MG/DL (ref 8.5–10.1)
CHLORIDE SERPL-SCNC: 102 MMOL/L (ref 94–109)
CO2 SERPL-SCNC: 28 MMOL/L (ref 20–32)
CREAT SERPL-MCNC: 0.48 MG/DL (ref 0.52–1.04)
GFR SERPL CREATININE-BSD FRML MDRD: ABNORMAL ML/MIN/1.7M2
GLUCOSE SERPL-MCNC: 159 MG/DL (ref 70–99)
POTASSIUM SERPL-SCNC: 3.7 MMOL/L (ref 3.4–5.3)
PROT SERPL-MCNC: 7.3 G/DL (ref 6.8–8.8)
SODIUM SERPL-SCNC: 139 MMOL/L (ref 133–144)

## 2017-04-11 RX ORDER — CEPHALEXIN 500 MG/1
500 CAPSULE ORAL 2 TIMES DAILY
Qty: 14 CAPSULE | Refills: 0 | Status: SHIPPED | OUTPATIENT
Start: 2017-04-11 | End: 2017-04-18

## 2017-04-12 ENCOUNTER — TRANSFERRED RECORDS (OUTPATIENT)
Dept: HEALTH INFORMATION MANAGEMENT | Facility: CLINIC | Age: 52
End: 2017-04-12

## 2017-04-12 LAB
BACTERIA SPEC CULT: ABNORMAL
MICRO REPORT STATUS: ABNORMAL
MICROORGANISM SPEC CULT: ABNORMAL
SPECIMEN SOURCE: ABNORMAL

## 2017-04-24 DIAGNOSIS — G89.4 CHRONIC PAIN SYNDROME: ICD-10-CM

## 2017-04-24 DIAGNOSIS — G35 MS (MULTIPLE SCLEROSIS) (H): ICD-10-CM

## 2017-04-24 NOTE — TELEPHONE ENCOUNTER
Routing to [facility] to gather opioid information    Heidi Beltran  BSN-RN Care Coordinator  [facility]

## 2017-04-24 NOTE — TELEPHONE ENCOUNTER
Received call from patient requesting refill(s) of Oxycodone     Last picked up from pharmacy on 03/24/2017    Pt last seen by prescribing provider on 03/28/2017  Next appt scheduled for none    Last urine drug screen date 06/09/2016  Current opioid agreement on file (completed within the last year) Yes Date of opioid agreement: 06/09/2016    Processing (pick one and delete the others):        Pt will  in clinic at  location      [facility]          Will route to nursing pool for review and preparation of prescription(s).

## 2017-04-24 NOTE — TELEPHONE ENCOUNTER
VM left today at 10:48 am    Reason for call: Medication   If this is a refill request, has the caller requested the refill from the pharmacy already? N/A  Will the patient be using a [facility]? No  Name of the pharmacy and phone number for the current request:  at [facility]    Name of the medication requested: Oxycodone    Other request:  will be picking it up     Phone Number Pt can be reached at: Home number on file [phone] (home)  Best Time: NA  Can we leave a detailed message on this number? YES     Merline KHAN    [facility] [place]

## 2017-04-25 ENCOUNTER — TELEPHONE (OUTPATIENT)
Dept: FAMILY MEDICINE | Facility: CLINIC | Age: 52
End: 2017-04-25

## 2017-04-25 RX ORDER — OXYCODONE HYDROCHLORIDE 15 MG/1
TABLET ORAL
Qty: 120 TABLET | Refills: 0 | Status: SHIPPED | OUTPATIENT
Start: 2017-04-25 | End: 2017-05-22

## 2017-04-25 NOTE — TELEPHONE ENCOUNTER
Julio C calling to clarify dosing of medications.  Dosing noted below provided.  Ness Chau RN  Triage [employer]

## 2017-04-25 NOTE — TELEPHONE ENCOUNTER
Routing to provider to review script prepped per below.     Oxycodone 15mg IR, #120, Refill:No  Sig:Take 1/2 to 1 tablet every 4 hours as needed for pain; max of 4 tablet(s) /day. May dispense on/after 04/25/17 to start taking on/after 04/25/17. 30 day supply  Last picked up on 03/24/17 with start on 03/26/17  Due: 04/25/17    Please remind to call 7 days prior to needing refill    Heidi TIJERINAN-RN Care Coordinator  [facility]

## 2017-04-25 NOTE — TELEPHONE ENCOUNTER
Medication request reviewed and approved.    Saba Worley, CNP    [facility]

## 2017-04-25 NOTE — TELEPHONE ENCOUNTER
Name of medication and dosage:  Duloxetine 30mg am and 60mg pm  Previously tried and failed:  cymbalta 30 qd, 30BID, lorazepam, amitriptylline  Submitted PA via:  CoverMyMeds.com  To:  Julio C  Reference #:  [id number]  Standard or Urgent:  Standard  Date submitted:  04-25-17  MA signature:  Radha Young, Medical Assistant

## 2017-04-26 NOTE — TELEPHONE ENCOUNTER
Rx placed at  [facility]. Left patient message.  Fernando authorized.    Haven Washburn, [facility]

## 2017-04-26 NOTE — TELEPHONE ENCOUNTER
4/25 355pm    Patient LM checking on status of Oxycodone refill. She will pickup  clinic.     Shabana Flores    [facility]

## 2017-04-26 NOTE — TELEPHONE ENCOUNTER
Response received regarding PA for Duloxetine (name of medication) - approved from 4/25/17 to 4/25/18  Informed patient and [facility]   Forms sent to scan.

## 2017-05-04 DIAGNOSIS — M54.5 LOW BACK PAIN, UNSPECIFIED BACK PAIN LATERALITY, UNSPECIFIED CHRONICITY, WITH SCIATICA PRESENCE UNSPECIFIED: Primary | ICD-10-CM

## 2017-05-15 DIAGNOSIS — G89.29 CHRONIC PAIN OF BOTH LOWER EXTREMITIES: ICD-10-CM

## 2017-05-15 DIAGNOSIS — M79.605 CHRONIC PAIN OF BOTH LOWER EXTREMITIES: ICD-10-CM

## 2017-05-15 DIAGNOSIS — M79.604 CHRONIC PAIN OF BOTH LOWER EXTREMITIES: ICD-10-CM

## 2017-05-15 NOTE — TELEPHONE ENCOUNTER
Received fax request from [facility] requesting refill(s) for gabapentin 600 mg    Last refilled on 3/28/2017 (Called patient to determine why has not been filled sooner, patient states she was using up her 300 mg    Pt last seen on 3/28/2017  Next appt scheduled for none scheduled    Will facilitate refill.    Haven Washburn, [facility]

## 2017-05-17 RX ORDER — GABAPENTIN 600 MG/1
600 TABLET ORAL 3 TIMES DAILY
Qty: 90 TABLET | Refills: 1 | Status: SHIPPED | OUTPATIENT
Start: 2017-05-17 | End: 2017-08-18

## 2017-05-22 DIAGNOSIS — G89.4 CHRONIC PAIN SYNDROME: ICD-10-CM

## 2017-05-22 DIAGNOSIS — G35 MS (MULTIPLE SCLEROSIS) (H): ICD-10-CM

## 2017-05-22 NOTE — TELEPHONE ENCOUNTER
Received call from patient requesting refill(s) of oxyCODONE (ROXICODONE) 15 MG IR tablet     Last picked up from pharmacy on 4/26/17    Pt last seen by prescribing provider on 3/28/17  Next appt scheduled for 6/15/17    Last urine drug screen date 6/9/16  Current opioid agreement on file (completed within the last year) Yes Date of opioid agreement: 6/9/16    Processing (pick one and delete the others):        Pt's , Fernando, will  in clinic at [place] location      Will route to nursing pool for review and preparation of prescription(s).

## 2017-05-22 NOTE — TELEPHONE ENCOUNTER
5/19 359pm    Patient LM requesting refill Oxycodone.  will . [phone].    Shabana Flores    [facility]

## 2017-05-22 NOTE — TELEPHONE ENCOUNTER
Medication refill information reviewed.     Due date for Oxycodone is 5/26/17     Prescriptions prepped for review.     Will route to provider.

## 2017-05-22 NOTE — TELEPHONE ENCOUNTER
Routed to MA pool to gather required information for opioid refill.    Haven Coreas, MSN, RN-BC  Care Coordinator  [facility]

## 2017-05-23 RX ORDER — OXYCODONE HYDROCHLORIDE 15 MG/1
TABLET ORAL
Qty: 120 TABLET | Refills: 0 | Status: SHIPPED | OUTPATIENT
Start: 2017-05-26 | End: 2017-06-15

## 2017-05-23 NOTE — TELEPHONE ENCOUNTER
Medication request reviewed and approved.  Will need UDS  And opioid contract renewal with next refill   Saba Worley CNP    [facility]

## 2017-05-23 NOTE — TELEPHONE ENCOUNTER
Rx placed at  [facility]. Left patient message. Also reminded patient to keep upcoming appointment on 6/15/17, and wrote on envelope that , Fernando,  is authorized to  for patient.     Haven Washburn, [facility]

## 2017-06-03 DIAGNOSIS — I10 HYPERTENSION GOAL BP (BLOOD PRESSURE) < 140/90: ICD-10-CM

## 2017-06-05 RX ORDER — LOSARTAN POTASSIUM AND HYDROCHLOROTHIAZIDE 25; 100 MG/1; MG/1
TABLET ORAL
Qty: 90 TABLET | Refills: 0 | Status: SHIPPED | OUTPATIENT
Start: 2017-06-05 | End: 2017-09-09

## 2017-06-05 RX ORDER — METOPROLOL SUCCINATE 50 MG/1
TABLET, EXTENDED RELEASE ORAL
Qty: 90 TABLET | Refills: 0 | Status: SHIPPED | OUTPATIENT
Start: 2017-06-05 | End: 2017-09-09

## 2017-06-05 NOTE — TELEPHONE ENCOUNTER
Losartan      Last Written Prescription Date: 05/02/2016  Last Fill Quantity: 90, # refills: 3  Last Office Visit with [facility], [facility] or [facility] prescribing provider: 04/10/2107  Next 5 appointments (look out 90 days)     Manoj 15, 2017  3:30 PM CDT   Return Visit with BAYLEE Elkins CNP   [facility]    [address]  [place] [facility] [address]   [phone]                   Potassium   Date Value Ref Range Status   04/10/2017 3.7 3.4 - 5.3 mmol/L Final     Creatinine   Date Value Ref Range Status   04/10/2017 0.48 (L) 0.52 - 1.04 mg/dL Final     BP Readings from Last 3 Encounters:   04/10/17 134/84   03/28/17 152/77   03/03/17 124/66       Metoprolol      Last Written Prescription Date: 05/02/2016  Last Fill Quantity: 90, # refills: 3    Last Office Visit with [facility], [facility] or [facility] prescribing provider:  04/10/2017   Future Office Visit:    Next 5 appointments (look out 90 days)     Manoj 15, 2017  3:30 PM CDT   Return Visit with BAYLEE Elkins CNP   [facility]    [address] [address]   [phone]                    BP Readings from Last 3 Encounters:   04/10/17 134/84   03/28/17 152/77   03/03/17 124/66

## 2017-06-15 ENCOUNTER — OFFICE VISIT (OUTPATIENT)
Dept: PALLIATIVE MEDICINE | Facility: CLINIC | Age: 52
End: 2017-06-15
Payer: COMMERCIAL

## 2017-06-15 VITALS
DIASTOLIC BLOOD PRESSURE: 62 MMHG | SYSTOLIC BLOOD PRESSURE: 135 MMHG | BODY MASS INDEX: 35.59 KG/M2 | WEIGHT: 241 LBS | HEART RATE: 82 BPM | OXYGEN SATURATION: 94 %

## 2017-06-15 DIAGNOSIS — G89.4 CHRONIC PAIN SYNDROME: ICD-10-CM

## 2017-06-15 DIAGNOSIS — M54.16 LUMBAR RADICULOPATHY: Primary | ICD-10-CM

## 2017-06-15 DIAGNOSIS — G35 MS (MULTIPLE SCLEROSIS) (H): ICD-10-CM

## 2017-06-15 PROCEDURE — 99213 OFFICE O/P EST LOW 20 MIN: CPT | Performed by: NURSE PRACTITIONER

## 2017-06-15 RX ORDER — OXYCODONE HYDROCHLORIDE 15 MG/1
7.5-15 TABLET ORAL EVERY 4 HOURS PRN
Qty: 120 TABLET | Refills: 0 | Status: SHIPPED | OUTPATIENT
Start: 2017-06-15 | End: 2017-08-18

## 2017-06-15 RX ORDER — OXYCODONE HYDROCHLORIDE 15 MG/1
TABLET ORAL
Qty: 120 TABLET | Refills: 0 | Status: SHIPPED | OUTPATIENT
Start: 2017-06-25 | End: 2017-08-18

## 2017-06-15 ASSESSMENT — PAIN SCALES - GENERAL: PAINLEVEL: SEVERE PAIN (6)

## 2017-06-15 NOTE — MR AVS SNAPSHOT
After Visit Summary   6/15/2017    Amadna Richardson    MRN: [id number]           Patient Information     Date Of Birth          1965        Visit Information        Provider Department      6/15/2017 3:30 PM Saba Worley APRN CNP [facility]        Today's Diagnoses     Lumbar radiculopathy    -  1    MS (multiple sclerosis) (H)        Chronic pain syndrome          Care Instructions      Oxycodone 15 mg prescription were given to you for June and July.  You will need to see your primary care provider before August 24 for your next prescription.    I am not longer on staff with [facility] as of 6/17/17.  I have discussed your care with Dr. Hsasan and he is in agreement to transition your care to him.    You will need urine drug screen today     Continue your acute low back pain care with your spine providers     See Dr. Coyle for your baclofen pump    See Dr. Vigil as directed for your MS care.     Best of luck to you and your health   ----------------------------------------------------------------  Nurse Triage line:  [phone]   Call this number with any questions or concerns. You may leave a detailed message anytime. Calls are typically returned Monday through Friday between 8 AM and 4:30 PM. We usually get back to you within 2 business days depending on the issue/request.       Medication refills:    For non-narcotic medications, call your pharmacy directly to request a refill. The pharmacy will contact the [facility] Management Center for authorization. Please allow 3-4 days for these refills to be processed.     For narcotic refills, call the nurse triage line or send a realSociable message. Please contact us 7-10 days before your refill is due. The message MUST include the name of the specific medication(s) requested and how you would like to receive the prescription(s). The options are as follows:    Pain Clinic staff can mail the prescription  to your pharmacy. Please tell us the name of the pharmacy.    You may pick the prescription up at the Pain Clinic (tell us the location) or during a clinic visit with your pain provider    Pain Clinic staff can deliver the prescription to the [facility] pharmacy in the clinic building. Please tell us the location.      Scheduling number: [phone].  Call this number to schedule or change appointments.    We believe regular attendance is key to your success in our program.    Any time you are unable to keep your appointment we ask that you call us at least 24 hours in advance to let us know. This will allow us to offer the appointment time to another patient.               Follow-ups after your visit        Who to contact     If you have questions or need follow up information about today's clinic visit or your schedule please contact [facility] directly at [phone].  Normal or non-critical lab and imaging results will be communicated to you by Tao Saleshart, letter or phone within 4 business days after the clinic has received the results. If you do not hear from us within 7 days, please contact the clinic through Tao Saleshart or phone. If you have a critical or abnormal lab result, we will notify you by phone as soon as possible.  Submit refill requests through Zenefits or call your pharmacy and they will forward the refill request to us. Please allow 3 business days for your refill to be completed.          Additional Information About Your Visit        Zenefits Information     Zenefits gives you secure access to your electronic health record. If you see a primary care provider, you can also send messages to your care team and make appointments. If you have questions, please call your primary care clinic.  If you do not have a primary care provider, please call [phone] and they will assist you.        Care EveryWhere ID     This is your Care EveryWhere ID. This could be used by other organizations to  access your [facility] medical records  [id number]        Your Vitals Were     Pulse Last Period Pulse Oximetry BMI (Body Mass Index)          82 12/11/2011 94% 35.59 kg/m2         Blood Pressure from Last 3 Encounters:   06/15/17 135/62   04/10/17 134/84   03/28/17 152/77    Weight from Last 3 Encounters:   06/15/17 109.3 kg (241 lb)   04/10/17 108.9 kg (240 lb)   03/03/17 108.9 kg (240 lb)              Today, you had the following     No orders found for display         Today's Medication Changes          These changes are accurate as of: 6/15/17  4:17 PM.  If you have any questions, ask your nurse or doctor.               These medicines have changed or have updated prescriptions.        Dose/Directions    * oxyCODONE 15 MG IR tablet   Commonly known as:  ROXICODONE   This may have changed:  You were already taking a medication with the same name, and this prescription was added. Make sure you understand how and when to take each.   Used for:  MS (multiple sclerosis) (H), Chronic pain syndrome        Dose:  7.5-15 mg   Take 0.5-1 tablets (7.5-15 mg) by mouth every 4 hours as needed for moderate to severe pain Limit 4 tablet(s) per day.Start 7/25/17.  Dispense on or after 7/23/17   Quantity:  120 tablet   Refills:  0       * oxyCODONE 15 MG IR tablet   Commonly known as:  ROXICODONE   This may have changed:  additional instructions   Used for:  MS (multiple sclerosis) (H), Chronic pain syndrome        Start taking on:  6/25/2017   Take 1/2 to 1 tablet every 4 hours as needed for pain; max of 4 tablet(s) /day. May dispense on/after 06/23/17 to start taking on/after 06/25/17. 30 day supply   Quantity:  120 tablet   Refills:  0       * Notice:  This list has 2 medication(s) that are the same as other medications prescribed for you. Read the directions carefully, and ask your doctor or other care provider to review them with you.         Where to get your medicines      Some of these will need a paper prescription and  others can be bought over the counter.  Ask your nurse if you have questions.     Bring a paper prescription for each of these medications     oxyCODONE 15 MG IR tablet    oxyCODONE 15 MG IR tablet                Primary Care Provider Office Phone # Fax #    Julius Hassan [phone]       [facility] [address] [facility] [address]        Goals        General    I will continue to work with home care until discharge goals are met. (pt-stated)     Notes - Note edited  10/28/2015  3:09 PM by Cordelia Moreland RN    As of today's date 9/22/2015 goal is met at 76 - 100%.   Goal Status:  Ongoing  As of today's date 10/28/2015 goal is met at 76 - 100%.   Goal Status:  Complete            Thank you!     Thank you for choosing [facility]  for your care. Our goal is always to provide you with excellent care. Hearing back from our patients is one way we can continue to improve our services. Please take a few minutes to complete the written survey that you may receive in the mail after your visit with us. Thank you!             Your Updated Medication List - Protect others around you: Learn how to safely use, store and throw away your medicines at www.disposemymeds.org.          This list is accurate as of: 6/15/17  4:17 PM.  Always use your most recent med list.                   Brand Name Dispense Instructions for use    amitriptyline 100 MG tablet    ELAVIL    90 tablet    Take 1 tablet (100 mg) by mouth At Bedtime       ASPIRIN PO      Take 162 mg by mouth daily       ATIVAN 0.5 MG tablet   Generic drug:  LORazepam     60 tablet    Take one dose at bedtime. May have 2 other doses every 6 hours if needed for muscle spasms. Max of 3 tabs per day. Do not take if sleepy.       baclofen 10 MG tablet    LIORESAL    120 tablet    Take 1 tablet (10 mg) by mouth 3 times daily May take an additional 10 mg as needed qd for total of 40 mg per day       * DULOXETINE HCL PO       Take 30 mg by mouth every morning       * DULOXETINE HCL PO      Take 60 mg by mouth every evening       gabapentin 600 MG tablet    NEURONTIN    90 tablet    Take 1 tablet (600 mg) by mouth 3 times daily May take an additional 300 mg per day as needed       glatiramer 20 MG/ML injection    COPAXONE    30 each    Inject 1 mL (20 mg) Subcutaneous daily       lidocaine 5 % Patch    LIDODERM    30 patch    Apply 1  patches to low back and right buttock ( cut to fit)  area at once for up to 12 h within a 24 h period.  Remove after 12 hours.       losartan-hydrochlorothiazide 100-25 MG per tablet    HYZAAR    90 tablet    TAKE ONE TABLET BY MOUTH ONCE DAILY       metoprolol 50 MG 24 hr tablet    TOPROL-XL    90 tablet    TAKE ONE TABLET BY MOUTH ONCE DAILY       MULTI-VITAMIN PO      Take 1 tablet by mouth daily       omega-3 fatty acids 1200 MG capsule      Take 1 capsule by mouth daily.       * oxyCODONE 15 MG IR tablet    ROXICODONE    120 tablet    Take 0.5-1 tablets (7.5-15 mg) by mouth every 4 hours as needed for moderate to severe pain Limit 4 tablet(s) per day.Start 7/25/17.  Dispense on or after 7/23/17       * oxyCODONE 15 MG IR tablet   Start taking on:  6/25/2017    ROXICODONE    120 tablet    Take 1/2 to 1 tablet every 4 hours as needed for pain; max of 4 tablet(s) /day. May dispense on/after 06/23/17 to start taking on/after 06/25/17. 30 day supply       VITAMIN D PO      Take 1 tablet by mouth daily 1000 IU daily       * Notice:  This list has 4 medication(s) that are the same as other medications prescribed for you. Read the directions carefully, and ask your doctor or other care provider to review them with you.

## 2017-06-15 NOTE — PROGRESS NOTES
Interval history :This a follow up examination  for Amanda Richardson who is an 49 year old female, who is  being seen in follow up for chronic pain.   Primary Care Provider:Julius Hassan MD.   in visit today  Last visit 03/28/17  Today's date: 06/15/2017    Chief complaints    Leg spasms worse baclofen pump insertion 4/21/17per Robert Byrne MD, [facility]      Back pain to right gluteal with right leg pain, spasm worsening, saw spine surgeon  Post JEFFREY right L3-4 per Robert Coyle MD     Opioid analgesia Oxycodone 15 mg high tolerance, taking 4 tablet(s) per day         Current Pain:   Description: aching, burning, sharp, tender,shooting, spasms off and on all day long severity variable  Location: low back, right hip right gluteal, right leg L4-5 ,   Pain level: 7/10,  Average 7/10, range 4-9/10  Quality  Worse at evening-night, continuous, exhausting, miserable, nagging, unbearable     Aggravating factors: inactivity, over doing   Relieving factors  Rest, medications, ice, stretching, TENS, meds ,ice lidoderm   Quality of life: mood depressed, but better  Denies suicidal thoughts, but frustrated.   Activity level not much walking any more, struggles with self care. Sleep still poor, sleeps periods of 2 hours at a time waking with painful spasms with a total 4-6 hours per night or less, naps during the day.     Progress in Program:   Counseling prn, Physical Therapy: complete,  Self care daily TENS, theracane, ice,  exercise yes,walking relaxation yes, body awareness no.    PAST MEDICATION TRIALS:  Hydrocodone,Fentanyl, OxyContin, Lea, Tramadol, Ibuprofen, Naproxen, Flexeril, Tizanidine, Cymbalta, Lyrica, Fentanyl. 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     Obesity 6/11/2012     Pain in joint, pelvic region and thigh 7/20/2012     Prediabetes      Spinal stenosis, lumbar 6/17/2012     Tobacco abuse 06/11/2012    former       Past Surgical History:  Past Surgical History:   Procedure Laterality Date     ------------OTHER-------------  2015    Right ankle I&D d/t infection     COLONOSCOPY N/A 1/7/2015    tubular adenomas x 2 - due 5 yrs     FUSION LUMBAR ANTERIOR, FUSION LUMBAR POSTERIOR TWO LEVELS, COMBINED  10/17/2013    lumbar fusion - Dr Floyd     OPEN REDUCTION INTERNAL FIXATION ANKLE  5/15    Right Bimalleolar ankle fx ORIF     OPEN REDUCTION INTERNAL FIXATION ANKLE Right 11/2015    Revision due to spasms pulling screws out of ankle     SURGICAL HISTORY OF -   2011    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     SURGICAL HISTORY OF -   3/14    Left L4-5 Epidural Dr Winter     SURGICAL HISTORY OF -   04/2017    intrathecal baclofen pump implantation     Current Medications:   Current Outpatient Prescriptions   Medication     metoprolol (TOPROL-XL) 50 MG 24 hr tablet     losartan-hydrochlorothiazide (HYZAAR) 100-25 MG per tablet     oxyCODONE (ROXICODONE) 15 MG IR tablet     gabapentin (NEURONTIN) 600 MG tablet     baclofen (LIORESAL) 10 MG tablet     ATIVAN 0.5 MG tablet     ASPIRIN PO     DULOXETINE HCL PO     DULOXETINE HCL PO     amitriptyline (ELAVIL) 100 MG tablet     lidocaine (LIDODERM) 5 % patch     glatiramer (COPAXONE) 20 MG/ML injection     Cholecalciferol (VITAMIN D PO)     Multiple  Vitamin (MULTI-VITAMIN PO)     omega-3 fatty acids (FISH OIL) 1200 MG capsule     No current facility-administered medications for this visit.      ROS: twelve systems review negative except for:  cough  joint pain, insomnia, blood in urine, urgency, weakness, stiffness, spasm, stiffness, numbness/tingling, and depression     Physical Exam  Constitutional:Blood pressure 135/62, pulse 82, weight 109.3 kg (241 lb), last menstrual period 12/11/2011, SpO2 94 %, not currently breastfeeding.  Psyche:  Fully oriented, Behavioral Observations: Eye contact good. Mood and spirits are  Depressed, but better   Working status no   Musculoskeletal exam: in w/c today. No spasms of right leg noted today at visit  Neuro exam:   Alert,  RONI @ 3 mm, Speech clear fluent and appropriate.  TURCIOS x 4,   Skin/Vascular/ Autonomic:  warm, dry and intact    Opioid risk:   DIRE Score for ongoing opioid management is calculated as follows:    Diagnosis = 3    Intractability = 2    Risk: Psych = 2  Chem Hlth = 2  Reliability = 1  Social = 2    Efficacy = 2    Total DIRE Score = 14 (14 or higher predicts good candidate for ongoing opioid management; 13 or lower predicts poor candidate for opioid management)   Daniel Corado 2006,The Journal of  Pain.,The DIRE Score: Predicting Outcomes of Opioid Prescribing for Chronic Pain Vol.7,No.9, pp 671-681.  MNPMP:  reviewed as expected without evidence of abuse, misuse or diversion.  Opioid tolerance moderate to high Morphine EQ 90  mg /day   Compliance: UDS 06/09/16 expected results, opioid agreement 06/09/16  Analgesia  very poor  Activity:little to none, Adverse events: none  Adherence: poor ( self regulates     Assessment:  1 Lumbar radiculopathy - right hip right leg  and low back  right hip bursitis piriformis syndrome in past    A. Chronic low back pain with bilateral radicular feature L2-3 on right and L3-4 on left. Reports increase low back radicular right leg pain.  Frequent spasms contributing      - Flexion based pain s/p interventional procedure left L2-3 epidural  controlled     - unlikely there is epidural access problematic due to extent of fusion                -History of lumbar multi level lumbar a/p fusion L4-S1 with pelvic fixation Oct 2013 (procedure below)     Anterior spinal fusion, L4-L5. Insertion of intervertebral biomechanical device, L4-L5 (PEEK device). Anterior lumbar instrumentation, L4-L5, with screw fixation and anterior cover plate. [place] of local autograft bone fro5%m bony endplates.Posterior lumbar fusion, L4-L5 (bilateral intertransverse technique) Posterolateral fusion, L5-S1 (bilateral intertransverse to sacral ala technique). Bilateral decompression, L4-L5.L5-S1. Segmental pedicle screw instrumentation, L4-S1. Bilateral pelvic screw instrumentation with connection to pedicle screws.  2.  Multiple sclerosis-  Spasms better   -  Follow up with Dr. Vigil    -Immobility and isolation problematic.     Patient has Resource given in past for national MS society at last visit  www.nationalmssociety.org/Resources- consider  service and see if MS resources can be available  for this          3. Gait and balance disorder  fall with subsequent injury post ORIF X2 right ankle   Monitor medication effects to reduce falls risk   Walks minimally    Baclofen pump insert 4/21/2017 followed by Dr. Robert Hankins       4. Mood disorder: depression symptoms ( isolation , increase pain and spasms, relationship stress)   5.   Insomnia Non restorative sleep with frequent awakening due to pain and non pain sources, self report of snoring leg jerks onset 11 pm    Plan:  Amitriptyline 100 mg     6. Encounter for continuous use of opioids:    24 opioid total per day  mg per day Oxycodone 60 mg  , Morphine  EQ  90 mg per day   Concerns with Self medicates, compliant since last visit   Narcan for home use        PROCEDURES:  None   Opioid management will continue with Dr. Julius Hassan starting  7/25/17  Attempted UDS sample patient  Unable to give sample     Future appointment/ Follow up :  With PCP to re- establish pain and ongoing medical care.  Neurology follow up with Dr. Vigil is scheduled  Neuro spine, Robert Hankins MD  for low back care, post lumbar JEFFREY  L3-4   Baclofen insertion 4/12 per Robert Hankins [facility]     Medications  Oxycodone 15 mg one tablet(s) every 4 hr as needed for pain limit 4 per day.  Next start date 6/25   Gabapentin by 600 mg per day tid      Other:  Narcan nasal spray     Time spent: 20  minutes including  5 minutes interval history, assessment and medication review/adjustments. 10 minutes for counseling, treatment options, and coordinating care for the above identified medical problem.     Signed: Saba Worley RN, BC, C.A.N.P.  [facility]

## 2017-06-15 NOTE — PATIENT INSTRUCTIONS
Oxycodone 15 mg prescription were given to you for June and July.  You will need to see your primary care provider before August 24 for your next prescription.    I am not longer on staff with [facility] as of 6/17/17.  I have discussed your care with Dr. Hassan and he is in agreement to transition your care to him.    You will need urine drug screen today     Continue your acute low back pain care with your spine providers     See Dr. Coyle for your baclofen pump    See Dr. Vigil as directed for your MS care.     Best of luck to you and your health   ----------------------------------------------------------------  Nurse Triage line:  [phone]   Call this number with any questions or concerns. You may leave a detailed message anytime. Calls are typically returned Monday through Friday between 8 AM and 4:30 PM. We usually get back to you within 2 business days depending on the issue/request.       Medication refills:    For non-narcotic medications, call your pharmacy directly to request a refill. The pharmacy will contact the [facility] for authorization. Please allow 3-4 days for these refills to be processed.     For narcotic refills, call the nurse triage line or send a [place] message. Please contact us 7-10 days before your refill is due. The message MUST include the name of the specific medication(s) requested and how you would like to receive the prescription(s). The options are as follows:    Pain Clinic staff can mail the prescription to your pharmacy. Please tell us the name of the pharmacy.    You may pick the prescription up at the Pain Clinic (tell us the location) or during a clinic visit with your pain provider    Pain Clinic staff can deliver the prescription to the [facility] in the clinic building. Please tell us the location.      Scheduling number: [phone].  Call this number to schedule or change appointments.    We believe regular attendance is key  to your success in our program.    Any time you are unable to keep your appointment we ask that you call us at least 24 hours in advance to let us know. This will allow us to offer the appointment time to another patient.

## 2017-06-15 NOTE — NURSING NOTE
"Chief Complaint   Patient presents with     Pain     follow up       Initial /62  Pulse 82  Wt 109.3 kg (241 lb)  LMP 12/11/2011  SpO2 94%  BMI 35.59 kg/m2 Estimated body mass index is 35.59 kg/(m^2) as calculated from the following:    Height as of 4/10/17: 1.753 m (5' 9\").    Weight as of this encounter: 109.3 kg (241 lb).  Medication Reconciliation: roxanne Nielsen [facility]        "

## 2017-07-20 DIAGNOSIS — G89.4 CHRONIC PAIN SYNDROME: ICD-10-CM

## 2017-07-20 DIAGNOSIS — G35 MS (MULTIPLE SCLEROSIS) (H): ICD-10-CM

## 2017-07-20 NOTE — TELEPHONE ENCOUNTER
Reason for Call:  Medication or medication refill:    Do you use a [facility]?  Name of the pharmacy and phone number for the current request:  Written RX to be picked up at       Name of the medication requested: oxycodone 15 MG     Other request: OK for  to  from  . Fernando Richardson. Please let pt know when RX is ready    Can we leave a detailed message on this number? YES    Phone number patient can be reached at:   [phone]         Best Time: any    Call taken on 7/20/2017 at 12:54 PM by Magdalena Ascencio

## 2017-07-21 RX ORDER — OXYCODONE HYDROCHLORIDE 15 MG/1
TABLET ORAL
Qty: 120 TABLET | Refills: 0 | Status: CANCELLED | OUTPATIENT
Start: 2017-07-21

## 2017-07-21 NOTE — TELEPHONE ENCOUNTER
She already got the prescription from the pain clinic, please review with patient, it was a postdated rx

## 2017-07-21 NOTE — TELEPHONE ENCOUNTER
Called pharmacy listed - they do not have the script on file     Called pt and advised that she should have the script in her paper work from her pain clinic visit on 6/15/2017  Pt stated she will look through her paper work     Cordelia Stevens RN, BSN with Karuna Kelly RN   [place] Triage

## 2017-07-21 NOTE — TELEPHONE ENCOUNTER
Disp Refills Start End AISHA   oxyCODONE (ROXICODONE) 15 MG IR tablet 120 tablet 0 6/25/2017  No   Sig: Take 1/2 to 1 tablet every 4 hours as needed for pain; max of 4 tablet(s) /day. May dispense on/after 06/23/17 to start taking on/after 06/25/17. 30 day supply     Problem List Complete:  Yes    Last Office Visit with [facility] primary care provider: 04/10/2017    Medication(s): Oxycodone 15 mg .   Maximum quantity per month: 120  Clinic visit frequency required: Q3 months      Controlled substance agreement:  Encounter-Level CSA - 06/09/2016         Future Office visit:   Next 5 appointments (look out 90 days)     Aug 18, 2017  2:40 PM CDT   [facility] Visit with Julius [facility]   [facility]    [address] [place] [id number] [phone]                   Processing:  [facility]     checked in past 6 months?  No, route to RN    Ivanna Kelly RN  [place] Triage

## 2017-07-21 NOTE — TELEPHONE ENCOUNTER
Controlled Substance Refill Request for oxyCODONE (ROXICODONE) 15 MG IR tablet       Disp Refills Start End AISHA   oxyCODONE (ROXICODONE) 15 MG IR tablet 120 tablet 0 6/25/2017  No   Sig: Take 1/2 to 1 tablet every 4 hours as needed for pain; max of 4 tablet(s) /day. May dispense on/after 06/23/17 to start taking on/after 06/25/17. 30 day supply   Class: Local Print   Order: [id number]       Problem List Complete:  Yes      Last Office Visit with [facility] primary care provider: 04/10/2017    Medication(s): Oxycodone 15 mg .   Maximum quantity per month: 120  Clinic visit frequency required: Q3 months      Controlled substance agreement:  Encounter-Level CSA - 06/09/2016          Future Office visit:   Next 5 appointments (look out 90 days)     Aug 18, 2017  2:40 PM CDT   [facility]    [address] [place] [id number] [phone]                   Processing:  Patient will  in clinic     checked in past 6 months?  No, route to RN       Ivanna Kelly RN  [place] Triage

## 2017-07-21 NOTE — TELEPHONE ENCOUNTER
Reason for Call:  Medication or medication refill:    Do you use a [facility]?  Name of the pharmacy and phone number for the current request:  [facility]    Name of the medication requested: Oxycodone    Other request: Patient says she will be out of this med tonight and says she needs it.    Can we leave a detailed message on this number? YES    Phone number patient can be reached at: Home number on file [phone] (home)    Best Time: Anytime    Call taken on 7/21/2017 at 11:20 AM by Tiffany Wood

## 2017-08-18 ENCOUNTER — OFFICE VISIT (OUTPATIENT)
Dept: FAMILY MEDICINE | Facility: CLINIC | Age: 52
End: 2017-08-18
Payer: COMMERCIAL

## 2017-08-18 VITALS
WEIGHT: 241 LBS | SYSTOLIC BLOOD PRESSURE: 134 MMHG | HEIGHT: 69 IN | HEART RATE: 92 BPM | OXYGEN SATURATION: 94 % | BODY MASS INDEX: 35.7 KG/M2 | RESPIRATION RATE: 12 BRPM | TEMPERATURE: 98.3 F | DIASTOLIC BLOOD PRESSURE: 86 MMHG

## 2017-08-18 DIAGNOSIS — G89.4 CHRONIC PAIN SYNDROME: ICD-10-CM

## 2017-08-18 DIAGNOSIS — M62.838 MUSCLE SPASMS OF BOTH LOWER EXTREMITIES: ICD-10-CM

## 2017-08-18 DIAGNOSIS — G89.29 CHRONIC PAIN OF BOTH LOWER EXTREMITIES: ICD-10-CM

## 2017-08-18 DIAGNOSIS — C85.81 OTHER SPECIFIED TYPE OF NON-HODGKIN LYMPHOMA OF HEAD (H): ICD-10-CM

## 2017-08-18 DIAGNOSIS — M79.605 CHRONIC PAIN OF BOTH LOWER EXTREMITIES: ICD-10-CM

## 2017-08-18 DIAGNOSIS — R73.03 PREDIABETES: ICD-10-CM

## 2017-08-18 DIAGNOSIS — M48.061 SPINAL STENOSIS, LUMBAR: ICD-10-CM

## 2017-08-18 DIAGNOSIS — I10 HYPERTENSION GOAL BP (BLOOD PRESSURE) < 140/90: ICD-10-CM

## 2017-08-18 DIAGNOSIS — F32.A MODERATE DEPRESSIVE EPISODE: ICD-10-CM

## 2017-08-18 DIAGNOSIS — M54.16 LUMBAR RADICULOPATHY: ICD-10-CM

## 2017-08-18 DIAGNOSIS — R82.90 ABNORMAL FINDING IN URINE: ICD-10-CM

## 2017-08-18 DIAGNOSIS — M79.604 CHRONIC PAIN OF BOTH LOWER EXTREMITIES: ICD-10-CM

## 2017-08-18 DIAGNOSIS — G35 MS (MULTIPLE SCLEROSIS) (H): Primary | ICD-10-CM

## 2017-08-18 DIAGNOSIS — Z13.6 CARDIOVASCULAR SCREENING; LDL GOAL LESS THAN 100: ICD-10-CM

## 2017-08-18 DIAGNOSIS — Z79.899 CONTROLLED SUBSTANCE AGREEMENT SIGNED: ICD-10-CM

## 2017-08-18 DIAGNOSIS — Z51.81 MEDICATION MONITORING ENCOUNTER: ICD-10-CM

## 2017-08-18 PROCEDURE — 99214 OFFICE O/P EST MOD 30 MIN: CPT | Performed by: FAMILY MEDICINE

## 2017-08-18 RX ORDER — OXYCODONE HYDROCHLORIDE 15 MG/1
7.5-15 TABLET ORAL EVERY 4 HOURS PRN
Qty: 120 TABLET | Refills: 0 | Status: SHIPPED | OUTPATIENT
Start: 2017-08-21 | End: 2017-08-18

## 2017-08-18 RX ORDER — GABAPENTIN 600 MG/1
600 TABLET ORAL 3 TIMES DAILY
Qty: 270 TABLET | Refills: 1 | Status: SHIPPED | OUTPATIENT
Start: 2017-08-18 | End: 2018-05-01

## 2017-08-18 RX ORDER — OXYCODONE HYDROCHLORIDE 15 MG/1
7.5-15 TABLET ORAL EVERY 4 HOURS PRN
Qty: 120 TABLET | Refills: 0 | Status: SHIPPED | OUTPATIENT
Start: 2017-08-21 | End: 2017-09-15

## 2017-08-18 ASSESSMENT — ANXIETY QUESTIONNAIRES
5. BEING SO RESTLESS THAT IT IS HARD TO SIT STILL: NOT AT ALL
3. WORRYING TOO MUCH ABOUT DIFFERENT THINGS: NOT AT ALL
IF YOU CHECKED OFF ANY PROBLEMS ON THIS QUESTIONNAIRE, HOW DIFFICULT HAVE THESE PROBLEMS MADE IT FOR YOU TO DO YOUR WORK, TAKE CARE OF THINGS AT HOME, OR GET ALONG WITH OTHER PEOPLE: SOMEWHAT DIFFICULT
6. BECOMING EASILY ANNOYED OR IRRITABLE: SEVERAL DAYS
7. FEELING AFRAID AS IF SOMETHING AWFUL MIGHT HAPPEN: NOT AT ALL
1. FEELING NERVOUS, ANXIOUS, OR ON EDGE: NOT AT ALL
GAD7 TOTAL SCORE: 2
2. NOT BEING ABLE TO STOP OR CONTROL WORRYING: NOT AT ALL

## 2017-08-18 ASSESSMENT — PATIENT HEALTH QUESTIONNAIRE - PHQ9
5. POOR APPETITE OR OVEREATING: SEVERAL DAYS
SUM OF ALL RESPONSES TO PHQ QUESTIONS 1-9: 14

## 2017-08-18 NOTE — PATIENT INSTRUCTIONS
Follow up with Pain Clinic, if needed    Follow up for fasting labs, when able      [facility]                        To reach your care team during and after hours:   [phone]  To reach our pharmacy:        [phone]    Clinic Hours                        Our clinic hours are:    Monday   7:30 am to 7:00 pm                  Tuesday through Friday 7:30 am to 5:00 pm                             Saturday   8:00 am to 12:00 pm      Sunday   Closed      Pharmacy Hours                        Our pharmacy hours are:    Monday   8:30 am to 7:00 pm       Tuesday to Friday  8:30 am to 6:00 pm                       Saturday    9:00 am to 1:00 pm              Sunday    Closed              There is also information available at our web site:  [email or web address]    If your provider ordered any lab tests and you do not receive the results within 10 business days, please call the clinic.    If you need a medication refill please contact your pharmacy.  Please allow 2-3 business days for your refill to be completed.    Our clinic offers telephone visits and e visits.  Please ask one of your team members to explain more.      Use [employer] (secure email communication and access to your chart) to send your primary care provider a message or make an appointment. Ask someone on your Team how to sign up for [employer].  Immunizations                      Immunization History   Administered Date(s) Administered     Influenza (H1N1) 12/10/2009     Influenza (IIV3) 12/10/2009, 11/06/2012, 12/08/2014     Influenza Vaccine IM 3yrs+ 4 Valent IIV4 10/21/2013     Pneumococcal 23 valent 01/05/2011     Tdap (Adacel,Boostrix) 09/17/2008        Health Maintenance                         Health Maintenance Due   Topic Date Due     Colon Cancer Screening - FIT Test - yearly  05/08/1973     Hepatitis C Screening  05/08/1981     Cholesterol Lab - yearly  05/02/2017     Microalbumin Lab - yearly  05/02/2017     URINE DRUG SCREEN Q1 YR   06/09/2017     Osteoporosis Screening (Dexa) - every 5 years   06/26/2017     Wellness Visit with your Primary Provider - yearly  08/08/2017     Mammogram - yearly  08/16/2017

## 2017-08-18 NOTE — LETTER
[address] [address]  [phone]        November 27, 2017    Amanda Richardson  [address] [place] [id number]              Dear Amanda Richardson    This is to remind you that your fasting lab work is due.    You may call our office at [phone] to schedule an appointment.    Please disregard this notice if you have already had your labs drawn or made an appointment.        Sincerely,        Julius Hassan M.D.

## 2017-08-18 NOTE — PROGRESS NOTES
SUBJECTIVE:   Amanda Richardson is a 52 year old female who presents to clinic today for the following health issues:    Pain Management -- Copaxone 20 mg injection daily, Baclofen 10 mg three times daily, Baclofen pump present, gabapentin 600 mg three time daily, Oxycodone 7.5-15 mg every 4 hours prn (4 max daily). She reported having 10 tablets oxycodone left, not aware of taking more than 4 tablets daily.     She has also been using OTC Lidocaine patches, which have helped some.     Amanda has a hx positive for Multiple Sclerosis/Lumbar Back Pain/Chronic Pain Syndrome, and has been [facility] by [facility]. [facility]. She is also being seeing by Neurology (Dr. Griffin) every 3 months - manages Baclofen pump.     She reported that she is not able to ambulate without use of wheelchair at this time due to decreased strength to legs (R>L) -- she is still performing all ADL's independently and self-transferring to toilet/chairs/bed.  She denied any diminished strength to arms.     Anxiety/Depression -- Amitriptyline 100 mg nightly, Ativan 0.5 mg prn, Duloxetine 30 mg daily in morning Duloxetine 60 mg daily in evening. Amanda reported ongoing issues with depression, and has been struggling with sleep due to muscular spasms at night.     PHQ-9: 14  REJI-7: 2    Lymphoma -- No concerns at this time.     UTI -- Amanda reported that she is currently on antibiotics, per UC.     HTN -- Metoprolol 50 mg daily, Losartan-HCTZ 100-25 mg daily.     BP Readings from Last 3 Encounters:   08/18/17 134/86   06/15/17 135/62   04/10/17 134/84     Lipids -- Aspirin 162 mg daily.     Recent Labs   Lab Test  05/02/16   1613  12/08/14   1756  11/13/12   0957   CHOL  169  172  205*   HDL  35*  36*  32*   LDL  107*  97  140*   TRIG  135  197*  167*   CHOLHDLRATIO   --   4.8  6.5*       Problem list and histories reviewed & adjusted, as indicated.  Additional history: as documented    Reviewed and updated as needed this visit by clinical  staff  Tobacco  Allergies  Meds  Med Hx  Surg Hx  Fam Hx  Soc Hx      Reviewed and updated as needed this visit by Provider         BP Readings from Last 3 Encounters:   08/18/17 134/86   06/15/17 135/62   04/10/17 134/84     Wt Readings from Last 4 Encounters:   08/18/17 241 lb (109.3 kg)   06/15/17 241 lb (109.3 kg)   04/10/17 240 lb (108.9 kg)   03/03/17 240 lb (108.9 kg)       Health Maintenance    Health Maintenance Due   Topic Date Due     FIT Q1 YR  05/08/1973     HEPATITIS C SCREENING  05/08/1981     LIPID MONITORING Q1 YEAR  05/02/2017     MICROALBUMIN Q1 YEAR  05/02/2017     URINE DRUG SCREEN Q1 YR  06/09/2017     DEXA Q5 YR  06/26/2017     WELLNESS VISIT Q1 YR  08/08/2017     MAMMO Q1 YR  08/16/2017       Current Problem List    Patient Active Problem List   Diagnosis     MS (multiple sclerosis) (H)     Non Hodgkin's lymphoma (H)     Obesity     Leukocytosis     Gallstone     Hypertension goal BP (blood pressure) < 140/90     Spinal stenosis, lumbar     Lumbago     Abnormality of gait     Nonallopathic lesion of thoracic region     Nonallopathic lesion of cervical region     Pain medication agreement- signed Nov 20,2012     Ex-smoker     Lumbar radiculopathy     Colon polyps     Neuralgia, neuritis, and radiculitis, unspecified     Encounter for long-term current use of medication     Health Care Home     Moderate depressive episode (H)     CARDIOVASCULAR SCREENING; LDL GOAL LESS THAN 160     Prediabetes     Leg muscle spasm     Spasm     Chronic pain syndrome     Controlled substance agreement signed     CARDIOVASCULAR SCREENING; LDL GOAL LESS THAN 100       Past Medical History    Past Medical History:   Diagnosis Date     Abnormality of gait 7/27/2012     CARDIOVASCULAR SCREENING; LDL GOAL LESS THAN 100      Chronic pain     [facility]     Colon polyps 1/15    tubular adenomas x 2     Gallstone 6/11/2012     Hypertension goal BP (blood pressure) < 140/90      Leukocytosis 6/11/2012      Moderate depressive episode (H)      MS (multiple sclerosis) (H) 2003    Dr Vigil, Dr Green     Non Hodgkin's lymphoma (H) 06/11/2012    Dr Erickson - [facility] - [facility] - T cell     Nonallopathic lesion of cervical region, not elsewhere classified 9/24/2012     Nonallopathic lesion of thoracic region, not elsewhere classified 9/24/2012     Numbness and tingling     From MS Feet, hands and around the waist line.     Obesity 6/11/2012     Pain in joint, pelvic region and thigh 7/20/2012     Prediabetes      Spinal stenosis, lumbar 6/17/2012     Tobacco abuse 06/11/2012    former       Past Surgical History    Past Surgical History:   Procedure Laterality Date     ------------OTHER-------------  2015    Right ankle I&D d/t infection     COLONOSCOPY N/A 1/7/2015    tubular adenomas x 2 - due 5 yrs     FUSION LUMBAR ANTERIOR, FUSION LUMBAR POSTERIOR TWO LEVELS, COMBINED  10/17/2013    lumbar fusion - Dr Flody     OPEN REDUCTION INTERNAL FIXATION ANKLE  5/15    Right Bimalleolar ankle fx ORIF     OPEN REDUCTION INTERNAL FIXATION ANKLE Right 11/2015    Revision due to spasms pulling screws out of ankle     SURGICAL HISTORY OF -   2011    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 120 tablet 0     metoprolol (TOPROL-XL) 50 MG 24 hr tablet TAKE ONE TABLET BY MOUTH ONCE DAILY 90 tablet 0     losartan-hydrochlorothiazide (HYZAAR) 100-25 MG per tablet TAKE ONE TABLET BY  MOUTH ONCE DAILY 90 tablet 0     baclofen (LIORESAL) 10 MG tablet Take 1 tablet (10 mg) by mouth 3 times daily May take an additional 10 mg as needed qd for total of 40 mg per day 120 tablet 1     ASPIRIN PO Take 162 mg by mouth daily       DULOXETINE HCL PO Take 30 mg by mouth every morning       DULOXETINE HCL PO Take 60 mg by mouth every evening       amitriptyline (ELAVIL) 100 MG tablet Take 1 tablet (100 mg) by mouth At Bedtime 90 tablet 3     lidocaine (LIDODERM) 5 % patch Apply 1  patches to low back and right buttock ( cut to fit)  area at once for up to 12 h within a 24 h period.  Remove after 12 hours. 30 patch 6     glatiramer (COPAXONE) 20 MG/ML injection Inject 1 mL (20 mg) Subcutaneous daily 30 each 0     Cholecalciferol (VITAMIN D PO) Take 1 tablet by mouth daily 1000 IU daily       Multiple Vitamin (MULTI-VITAMIN PO) Take 1 tablet by mouth daily        omega-3 fatty acids (FISH OIL) 1200 MG capsule Take 1 capsule by mouth daily.       ATIVAN 0.5 MG tablet Take one dose at bedtime. May have 2 other doses every 6 hours if needed for muscle spasms. Max of 3 tabs per day. Do not take if sleepy. 60 tablet 0       Allergies    Allergies   Allergen Reactions     Lisinopril      Lip swelling     Flexeril [Cyclobenzaprine Hcl]      Got confused        Immunizations    Immunization History   Administered Date(s) Administered     Influenza (H1N1) 12/10/2009     Influenza (IIV3) 12/10/2009, 11/06/2012, 12/08/2014     Influenza Vaccine IM 3yrs+ 4 Valent IIV4 10/21/2013     Pneumococcal 23 valent 01/05/2011     Tdap (Adacel,Boostrix) 09/17/2008       Family History    Family History   Problem Relation Age of Onset     C.A.D. Father      with CHF      CANCER Father      ? unsure type - abdominal      Hypertension Mother      Thyroid Disease Mother      goiter      Breast Cancer Sister 58     Thyroid Disease Son      Cancer - colorectal No family hx of        Social History    Social History     Social History      "Marital status:      Spouse name: Fernando     Number of children: 3     Years of education: 14     Occupational History      Unemployed     Social History Main Topics     Smoking status: Former Smoker     Types: Cigarettes     Quit date: 8/4/2013     Smokeless tobacco: Never Used     Alcohol use No     Drug use: No     Sexual activity: Yes     Partners: Male     Other Topics Concern     Parent/Sibling W/ Cabg, Mi Or Angioplasty Before 65f 55m? No     Caffeine Concern Yes     occas     Exercise Yes     as able     Seat Belt Yes     Social History Narrative       All above reviewed and updated, all stable unless otherwise noted    Recent labs reviewed    ROS:  Constitutional, HEENT, cardiovascular, pulmonary, GI, , musculoskeletal, neuro, skin, endocrine and psych systems are negative, except as in HPI or otherwise noted     This document serves as a record of the services and decisions personally performed and made by Julius Hassan MD FAAFP. It was created on their behalf by Malick Lopez, a trained medical scribe. The creation of this document is based the provider's statements to the medical scribe.  Malick Lopez August 18, 2017 3:08 PM    OBJECTIVE:                                                    /86  Pulse 92  Temp 98.3  F (36.8  C) (Tympanic)  Resp 12  Ht 5' 9\" (1.753 m)  Wt 241 lb (109.3 kg)  LMP 12/11/2011  SpO2 94%  Breastfeeding? No  BMI 35.59 kg/m2  Body mass index is 35.59 kg/(m^2).  GENERAL: healthy, alert and no distress, morbidly obese, pt in wheelchair  HENT: ear canals and TM's normal upon viewing with otoscope, nose and mouth without ulcers or lesions upon viewing with otoscope  RESP: lungs clear to auscultation - no rales, no rhonchi, no wheezes  CV: regular rates and rhythm, normal S1 S2, no S3 or S4 and no murmur, no click or rub -  SKIN: no suspicious lesions, no rashes to visible skin  NEURO: mentation intact and speech normal  PSYCH: Alert and oriented times 3; speech- coherent " , normal rate and volume; able to articulate logical thoughts, able to abstract reason, no tangential thoughts, no hallucinations or delusions, affect- normal    DIAGNOSTICS/PROCEDURES:                                                      Results for orders placed or performed in visit on 04/10/17   CBC with platelets   Result Value Ref [facility]    WBC 15.1 (H) 4.0 - 11.0 10e9/L    RBC Count 5.23 (H) 3.8 - 5.2 10e12/L    Hemoglobin 14.0 11.7 - 15.7 g/dL    Hematocrit 43.0 35.0 - 47.0 %    MCV 82 78 - 100 fl    MCH 26.8 26.5 - 33.0 pg    MCHC 32.6 31.5 - 36.5 g/dL    RDW 15.0 10.0 - 15.0 %    Platelet Count 313 150 - 450 10e9/L   Comprehensive metabolic panel   Result Value Ref [facility]    Sodium 139 133 - 144 mmol/L    Potassium 3.7 3.4 - 5.3 mmol/L    Chloride 102 94 - 109 mmol/L    Carbon Dioxide 28 20 - 32 mmol/L    Anion Gap 9 3 - 14 mmol/L    Glucose 159 (H) 70 - 99 mg/dL    Urea Nitrogen 20 7 - 30 mg/dL    Creatinine 0.48 (L) 0.52 - 1.04 mg/dL    GFR Estimate >90  Non  GFR Calc   >60 mL/min/1.7m2    GFR Estimate If Black >90   GFR Calc   >60 mL/min/1.7m2    Calcium 8.6 8.5 - 10.1 mg/dL    Bilirubin Total 0.4 0.2 - 1.3 mg/dL    Albumin 3.2 (L) 3.4 - 5.0 g/dL    Protein Total 7.3 6.8 - 8.8 g/dL    Alkaline Phosphatase 91 40 - 150 U/L    ALT 27 0 - 50 U/L    AST 13 0 - 45 U/L   UA reflex to Microscopic and Culture   Result Value Ref [facility]    Color Urine Yellow     Appearance Urine Cloudy     Glucose Urine Negative NEG mg/dL    Bilirubin Urine Negative NEG    Ketones Urine Negative NEG mg/dL    Specific Gravity Urine 1.025 1.003 - 1.035    Blood Urine Moderate (A) NEG    pH Urine 5.5 5.0 - 7.0 pH    Protein Albumin Urine 30 (A) NEG mg/dL    Urobilinogen Urine 0.2 0.2 - 1.0 EU/dL    Nitrite Urine Positive (A) NEG    Leukocyte Esterase Urine Negative NEG    Source Midstream Urine    Urine Microscopic   Result Value Ref [facility]    WBC Urine 5-10 (A) 0 - 2 /HPF    RBC Urine 2-5 (A) 0 - 2 /HPF     Squamous Epithelial /LPF Urine Many (A) FEW /LPF    Bacteria Urine Many (A) NEG /HPF   Urine Culture Aerobic Bacterial   Result Value Ref [facility]    Specimen Description Midstream Urine     Culture Micro >100,000 colonies/mL Escherichia coli (A)     Micro Report Status FINAL 04/12/2017     Organism: >100,000 colonies/mL Escherichia coli        Susceptibility    >100,000 colonies/ml escherichia coli (alfredo) -  (no method available)     AMPICILLIN 8 Susceptible  ug/mL     CEFAZOLIN Value in next row  ug/mL      <=4 SusceptibleCefazolin ALFREDO breakpoints are for the treatment of uncomplicated urinary tract infections.  For the treatment of systemic infections, please contact the laboratory for additional testing.     CEFOXITIN Value in next row  ug/mL      <=4 SusceptibleCefazolin ALFREDO breakpoints are for the treatment of uncomplicated urinary tract infections.  For the treatment of systemic infections, please contact the laboratory for additional testing.     CEFTAZIDIME Value in next row  ug/mL      <=4 SusceptibleCefazolin ALFREDO breakpoints are for the treatment of uncomplicated urinary tract infections.  For the treatment of systemic infections, please contact the laboratory for additional testing.     CEFTRIAXONE Value in next row  ug/mL      <=4 SusceptibleCefazolin ALFREDO breakpoints are for the treatment of uncomplicated urinary tract infections.  For the treatment of systemic infections, please contact the laboratory for additional testing.     CIPROFLOXACIN Value in next row  ug/mL      <=4 SusceptibleCefazolin ALFREDO breakpoints are for the treatment of uncomplicated urinary tract infections.  For the treatment of systemic infections, please contact the laboratory for additional testing.     GENTAMICIN Value in next row  ug/mL      <=4 SusceptibleCefazolin ALFREDO breakpoints are for the treatment of uncomplicated urinary tract infections.  For the treatment of systemic infections, please contact the laboratory for  additional testing.     LEVOFLOXACIN Value in next row  ug/mL      <=4 SusceptibleCefazolin SUSY breakpoints are for the treatment of uncomplicated urinary tract infections.  For the treatment of systemic infections, please contact the laboratory for additional testing.     NITROFURANTOIN Value in next row  ug/mL      <=4 SusceptibleCefazolin SUSY breakpoints are for the treatment of uncomplicated urinary tract infections.  For the treatment of systemic infections, please contact the laboratory for additional testing.     TOBRAMYCIN Value in next row  ug/mL      <=4 SusceptibleCefazolin SUSY breakpoints are for the treatment of uncomplicated urinary tract infections.  For the treatment of systemic infections, please contact the laboratory for additional testing.     Trimethoprim/Sulfa Value in next row  ug/mL      <=4 SusceptibleCefazolin SUSY breakpoints are for the treatment of uncomplicated urinary tract infections.  For the treatment of systemic infections, please contact the laboratory for additional testing.     AMPICILLIN/SULBACTAM Value in next row  ug/mL      <=4 SusceptibleCefazolin SUSY breakpoints are for the treatment of uncomplicated urinary tract infections.  For the treatment of systemic infections, please contact the laboratory for additional testing.     Piperacillin/Tazo Value in next row  ug/mL      <=4 SusceptibleCefazolin SUSY breakpoints are for the treatment of uncomplicated urinary tract infections.  For the treatment of systemic infections, please contact the laboratory for additional testing.     CEFEPIME Value in next row  ug/mL      <=4 SusceptibleCefazolin SUSY breakpoints are for the treatment of uncomplicated urinary tract infections.  For the treatment of systemic infections, please contact the laboratory for additional testing.        ASSESSMENT/PLAN:                                                        ICD-10-CM    1. MS (multiple sclerosis) (H) G35 oxyCODONE (ROXICODONE) 15 MG IR  tablet     Comprehensive metabolic panel     CBC with platelets     DISCONTINUED: oxyCODONE (ROXICODONE) 15 MG IR tablet   2. Spinal stenosis, lumbar M48.06    3. Lumbar radiculopathy M54.16    4. Chronic pain of both lower extremities M79.604 gabapentin (NEURONTIN) 600 MG tablet    M79.605     G89.29    5. Muscle spasms of both lower extremities M62.838 Comprehensive metabolic panel   6. Chronic pain syndrome G89.4 oxyCODONE (ROXICODONE) 15 MG IR tablet     Comprehensive metabolic panel     CBC with platelets     DISCONTINUED: oxyCODONE (ROXICODONE) 15 MG IR tablet   7. Controlled substance agreement signed Z79.899    8. 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 Moderate depressive episode (H) F32.1 TSH with free T4 reflex   10. Prediabetes R73.03 Comprehensive metabolic panel     Hemoglobin A1c     *UA reflex to Microscopic and Culture ([facility] (except [place] and [place])     Albumin Random Urine Quantitative     TSH with free T4 reflex   11. Hypertension goal BP (blood pressure) < 140/90 I10 Comprehensive metabolic panel     *UA reflex to Microscopic and Culture ([facility] (except [place] and [place])     Albumin Random Urine Quantitative   12. CARDIOVASCULAR SCREENING; LDL GOAL LESS THAN 100 Z13.6 Comprehensive metabolic panel     Lipid panel reflex to direct LDL     CK total   13. Medication monitoring encounter Z51.81 Comprehensive metabolic panel     Lipid panel reflex to direct LDL     CK total     CBC with platelets     Hemoglobin A1c     *UA reflex to Microscopic and Culture ([facility] (except [place] and [place])     Albumin Random Urine Quantitative     Urine Culture Aerobic Bacterial     TSH with free T4 reflex   14. Abnormal finding in urine  R82.90 Urine Culture Aerobic Bacterial     Discussed treatment/modality options, including risk and benefits, she  desires further health care maintenance and further lab(s), medication refills, observation. All diagnosis above reviewed and noted above, otherwise stable.  [facility] for further details.  Follow up in 3 month(s) and as needed.    Health Maintenance Due   Topic Date Due     FIT Q1 YR  05/08/1973     HEPATITIS C SCREENING  05/08/1981     LIPID MONITORING Q1 YEAR  05/02/2017     MICROALBUMIN Q1 YEAR  05/02/2017     URINE DRUG SCREEN Q1 YR  06/09/2017     DEXA Q5 YR  06/26/2017     WELLNESS VISIT Q1 YR  08/08/2017     MAMMO Q1 YR  08/16/2017       Patient Instructions     Follow up with Pain Clinic, if needed    Follow up for fasting labs, when able      The information in this document, created by the medical scribe for me, accurately reflects the services I personally performed and the decisions made by me. I have reviewed and approved this document for accuracy.   Julius Hassan MD M[facility] with [facility]            Julius Hassan MD [address]  [phone] Fax

## 2017-08-18 NOTE — MR AVS SNAPSHOT
After Visit Summary   8/18/2017    Amanda Richardson    MRN: [id number]           Patient Information     Date Of Birth          1965        Visit Information        Provider Department      8/18/2017 2:40 PM Julius Hassan MD [facility]        Today's Diagnoses     MS (multiple sclerosis) (H)    -  1    Chronic pain syndrome        Spinal stenosis, lumbar        Lumbar radiculopathy        Chronic pain of both lower extremities        Muscle spasms of both lower extremities        Controlled substance agreement signed        Moderate depressive episode (H)        Prediabetes        Hypertension goal BP (blood pressure) < 140/90        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)        Medication monitoring encounter          Care Instructions    Follow up with Pain Clinic, if needed    Follow up for fasting labs, when able      [facility]                        To reach your care team during and after hours:   [phone]  To reach our pharmacy:        [phone]    Clinic Hours                        Our clinic hours are:    Monday   7:30 am to 7:00 pm                  Tuesday through Friday 7:30 am to 5:00 pm                             Saturday   8:00 am to 12:00 pm      Sunday   Closed      Pharmacy Hours                        Our pharmacy hours are:    Monday   8:30 am to 7:00 pm       Tuesday to Friday  8:30 am to 6:00 pm                       Saturday    9:00 am to 1:00 pm              Sunday    Closed              There is also information available at our web site:  [email or web address]    If your provider ordered any lab tests and you do not receive the results within 10 business days, please call the clinic.    If you need a medication refill please contact your pharmacy.  Please allow 2-3 business days for your refill to be completed.    Our clinic offers telephone visits and e visits.  Please ask one of your team members to explain more.       Use Quan[employer] (secure email communication and access to your chart) to send your primary care provider a message or make an appointment. Ask someone on your Team how to sign up for Quanterix.  Immunizations                      Immunization History   Administered Date(s) Administered     Influenza (H1N1) 12/10/2009     Influenza (IIV3) 12/10/2009, 11/06/2012, 12/08/2014     Influenza Vaccine IM 3yrs+ 4 Valent IIV4 10/21/2013     Pneumococcal 23 valent 01/05/2011     Tdap (Adacel,Boostrix) 09/17/2008        Health Maintenance                         Health Maintenance Due   Topic Date Due     Colon Cancer Screening - FIT Test - yearly  05/08/1973     Hepatitis C Screening  05/08/1981     Cholesterol Lab - yearly  05/02/2017     Microalbumin Lab - yearly  05/02/2017     URINE DRUG SCREEN Q1 YR  06/09/2017     Osteoporosis Screening (Dexa) - every 5 years   06/26/2017     Wellness Visit with your Primary Provider - yearly  08/08/2017     Mammogram - yearly  08/16/2017               Follow-ups after your visit        Who to contact     If you have questions or need follow up information about today's clinic visit or your schedule please contact [facility] directly at [phone].  Normal or non-critical lab and imaging results will be communicated to you by Meiyouhart, letter or phone within 4 business days after the clinic has received the results. If you do not hear from us within 7 days, please contact the clinic through Plivot or phone. If you have a critical or abnormal lab result, we will notify you by phone as soon as possible.  Submit refill requests through Quan[employer] or call your pharmacy and they will forward the refill request to us. Please allow 3 business days for your refill to be completed.          Additional Information About Your Visit        [employer] Information     Quanterix gives you secure access to your electronic health record. If you see a primary care provider, you can also send  "messages to your care team and make appointments. If you have questions, please call your primary care clinic.  If you do not have a primary care provider, please call [phone] and they will assist you.        Care EveryWhere ID     This is your Care EveryWhere ID. This could be used by other organizations to access your [facility] medical records  [id number]        Your Vitals Were     Pulse Temperature Respirations Height Last Period Pulse Oximetry    92 98.3  F (36.8  C) (Tympanic) 12 5' 9\" (1.753 m) 12/11/2011 94%    Breastfeeding? BMI (Body Mass Index)                No 35.59 kg/m2           Blood Pressure from Last 3 Encounters:   08/18/17 134/86   06/15/17 135/62   04/10/17 134/84    Weight from Last 3 Encounters:   08/18/17 241 lb (109.3 kg)   06/15/17 241 lb (109.3 kg)   04/10/17 240 lb (108.9 kg)              Today, you had the following     No orders found for display         Today's Medication Changes          These changes are accurate as of: 8/18/17  3:39 PM.  If you have any questions, ask your nurse or doctor.               These medicines have changed or have updated prescriptions.        Dose/Directions    oxyCODONE 15 MG IR tablet   Commonly known as:  ROXICODONE   This may have changed:    - how much to take  - how to take this  - when to take this  - reasons to take this  - additional instructions   Used for:  MS (multiple sclerosis) (H), Chronic pain syndrome   Changed by:  Julius Hassan MD        Dose:  7.5-15 mg   Start taking on:  8/21/2017   Take 0.5-1 tablets (7.5-15 mg) by mouth every 4 hours as needed for moderate to severe pain Limit 4 tablet(s) per day.   Quantity:  120 tablet   Refills:  0            Where to get your medicines      These medications were sent to [facility] - [address]  [address] [place] [address]     Phone:  [phone]     gabapentin 600 MG tablet         Some of these will need a paper prescription and " others can be bought over the counter.  Ask your nurse if you have questions.     Bring a paper prescription for each of these medications     oxyCODONE 15 MG IR tablet                Primary Care Provider Office Phone # Fax #    Julius Hassan [phone] [address] [facility] [address]        Goals        General    I will continue to work with home care until discharge goals are met. (pt-stated)     Notes - Note edited  10/28/2015  3:09 PM by Cordelia Moreland RN    As of today's date 9/22/2015 goal is met at 76 - 100%.   Goal Status:  Ongoing  As of today's date 10/28/2015 goal is met at 76 - 100%.   Goal Status:  Complete            Equal Access to Services     JUAN [facility] : Hadii martha swain hadasho Sokirkali, waaxda luqadaha, qaybta kaalmada adeegyada, marco chavira . So [facility] [phone].    ATENCIÓN: Si habla español, tiene a woods disposición servicios gratuitos de asistencia lingüística. Llame al [phone].    We comply with applicable federal civil rights laws and [place] laws. We do not discriminate on the basis of race, color, national origin, age, disability sex, sexual orientation or gender identity.            Thank you!     Thank you for choosing [facility]  for your care. Our goal is always to provide you with excellent care. Hearing back from our patients is one way we can continue to improve our services. Please take a few minutes to complete the written survey that you may receive in the mail after your visit with us. Thank you!             Your Updated Medication List - Protect others around you: Learn how to safely use, store and throw away your medicines at www.disposemymeds.org.          This list is accurate as of: 8/18/17  3:39 PM.  Always use your most recent med list.                   Brand Name Dispense Instructions for use Diagnosis    amitriptyline 100 MG tablet    ELAVIL    90 tablet    Take 1 tablet (100 mg)  by mouth At Bedtime    Lumbar radiculopathy, Chronic pain syndrome, MS (multiple sclerosis) (H), Moderate depressive episode (H)       ASPIRIN PO      Take 162 mg by mouth daily        ATIVAN 0.5 MG tablet   Generic drug:  LORazepam     60 tablet    Take one dose at bedtime. May have 2 other doses every 6 hours if needed for muscle spasms. Max of 3 tabs per day. Do not take if sleepy.    Muscle spasms of both lower extremities       baclofen 10 MG tablet    LIORESAL    120 tablet    Take 1 tablet (10 mg) by mouth 3 times daily May take an additional 10 mg as needed qd for total of 40 mg per day    Chronic pain of both lower extremities       * DULOXETINE HCL PO      Take 30 mg by mouth every morning        * DULOXETINE HCL PO      Take 60 mg by mouth every evening        gabapentin 600 MG tablet    NEURONTIN    270 tablet    Take 1 tablet (600 mg) by mouth 3 times daily May take an additional 300 mg per day as needed    Chronic pain of both lower extremities       glatiramer 20 MG/ML injection    COPAXONE    30 each    Inject 1 mL (20 mg) Subcutaneous daily    MS (multiple sclerosis) (H)       lidocaine 5 % Patch    LIDODERM    30 patch    Apply 1  patches to low back and right buttock ( cut to fit)  area at once for up to 12 h within a 24 h period.  Remove after 12 hours.    Lumbar radiculopathy, Muscle spasms of both lower extremities       losartan-hydrochlorothiazide 100-25 MG per tablet    HYZAAR    90 tablet    TAKE ONE TABLET BY MOUTH ONCE DAILY    Hypertension goal BP (blood pressure) < 140/90       metoprolol 50 MG 24 hr tablet    TOPROL-XL    90 tablet    TAKE ONE TABLET BY MOUTH ONCE DAILY    Hypertension goal BP (blood pressure) < 140/90       MULTI-VITAMIN PO      Take 1 tablet by mouth daily        omega-3 fatty acids 1200 MG capsule      Take 1 capsule by mouth daily.        oxyCODONE 15 MG IR tablet   Start taking on:  8/21/2017    ROXICODONE    120 tablet    Take 0.5-1 tablets (7.5-15 mg) by mouth  every 4 hours as needed for moderate to severe pain Limit 4 tablet(s) per day.    MS (multiple sclerosis) (H), Chronic pain syndrome       VITAMIN D PO      Take 1 tablet by mouth daily 1000 IU daily        * Notice:  This list has 2 medication(s) that are the same as other medications prescribed for you. Read the directions carefully, and ask your doctor or other care provider to review them with you.

## 2017-08-18 NOTE — NURSING NOTE
"Chief Complaint   Patient presents with     Recheck Medication       Initial /86  Pulse 92  Temp 98.3  F (36.8  C) (Tympanic)  Resp 12  Ht 5' 9\" (1.753 m)  Wt 241 lb (109.3 kg)  LMP 12/11/2011  SpO2 94%  Breastfeeding? No  BMI 35.59 kg/m2 Estimated body mass index is 35.59 kg/(m^2) as calculated from the following:    Height as of this encounter: 5' 9\" (1.753 m).    Weight as of this encounter: 241 lb (109.3 kg).  Medication Reconciliation: complete   Sherie Bloedow LPN    "

## 2017-08-19 ASSESSMENT — ANXIETY QUESTIONNAIRES: GAD7 TOTAL SCORE: 2

## 2017-08-31 DIAGNOSIS — Z51.81 MEDICATION MONITORING ENCOUNTER: ICD-10-CM

## 2017-08-31 DIAGNOSIS — R82.90 ABNORMAL FINDING IN URINE: ICD-10-CM

## 2017-08-31 DIAGNOSIS — R73.03 PREDIABETES: ICD-10-CM

## 2017-08-31 DIAGNOSIS — I10 HYPERTENSION GOAL BP (BLOOD PRESSURE) < 140/90: ICD-10-CM

## 2017-08-31 LAB
ALBUMIN UR-MCNC: 100 MG/DL
APPEARANCE UR: CLEAR
BACTERIA #/AREA URNS HPF: ABNORMAL /HPF
BILIRUB UR QL STRIP: NEGATIVE
COLOR UR AUTO: YELLOW
CREAT UR-MCNC: 48 MG/DL
GLUCOSE UR STRIP-MCNC: NEGATIVE MG/DL
HGB UR QL STRIP: ABNORMAL
KETONES UR STRIP-MCNC: NEGATIVE MG/DL
LEUKOCYTE ESTERASE UR QL STRIP: NEGATIVE
MICROALBUMIN UR-MCNC: 250 MG/L
MICROALBUMIN/CREAT UR: 524.11 MG/G CR (ref 0–25)
NITRATE UR QL: NEGATIVE
NON-SQ EPI CELLS #/AREA URNS LPF: ABNORMAL /LPF
PH UR STRIP: 7 PH (ref 5–7)
RBC #/AREA URNS AUTO: ABNORMAL /HPF
SOURCE: ABNORMAL
SP GR UR STRIP: 1.02 (ref 1–1.03)
UROBILINOGEN UR STRIP-ACNC: 0.2 EU/DL (ref 0.2–1)
WBC #/AREA URNS AUTO: ABNORMAL /HPF

## 2017-08-31 PROCEDURE — 81001 URINALYSIS AUTO W/SCOPE: CPT | Performed by: FAMILY MEDICINE

## 2017-08-31 PROCEDURE — 87086 URINE CULTURE/COLONY COUNT: CPT | Performed by: FAMILY MEDICINE

## 2017-08-31 PROCEDURE — 82043 UR ALBUMIN QUANTITATIVE: CPT | Performed by: FAMILY MEDICINE

## 2017-09-01 LAB
BACTERIA SPEC CULT: NO GROWTH
SPECIMEN SOURCE: NORMAL

## 2017-09-09 DIAGNOSIS — M54.16 LUMBAR RADICULOPATHY: ICD-10-CM

## 2017-09-09 DIAGNOSIS — G89.4 CHRONIC PAIN SYNDROME: ICD-10-CM

## 2017-09-09 DIAGNOSIS — I10 HYPERTENSION GOAL BP (BLOOD PRESSURE) < 140/90: ICD-10-CM

## 2017-09-09 DIAGNOSIS — F32.A MODERATE DEPRESSIVE EPISODE: ICD-10-CM

## 2017-09-09 DIAGNOSIS — G35 MS (MULTIPLE SCLEROSIS) (H): ICD-10-CM

## 2017-09-11 RX ORDER — METOPROLOL SUCCINATE 50 MG/1
TABLET, EXTENDED RELEASE ORAL
Qty: 90 TABLET | Refills: 0 | Status: SHIPPED | OUTPATIENT
Start: 2017-09-11 | End: 2017-12-18

## 2017-09-11 RX ORDER — DULOXETIN HYDROCHLORIDE 30 MG/1
CAPSULE, DELAYED RELEASE ORAL
Qty: 270 CAPSULE | Refills: 3 | Status: SHIPPED | OUTPATIENT
Start: 2017-09-11 | End: 2018-02-12

## 2017-09-11 RX ORDER — LOSARTAN POTASSIUM AND HYDROCHLOROTHIAZIDE 25; 100 MG/1; MG/1
TABLET ORAL
Qty: 90 TABLET | Refills: 0 | Status: SHIPPED | OUTPATIENT
Start: 2017-09-11 | End: 2018-01-08

## 2017-09-11 NOTE — TELEPHONE ENCOUNTER
Duloxetine 30 MG EC Capsule      Last Written Prescription Date:  Medication on med list is historical  Last Fill Quantity: n/a,   # refills: n/a  [place] [facility] [place] [facility] prescribing provider: 8-18-17  Future Office visit:   n/a    Routing refill request to provider for review/approval because:  Drug not active on patient's medication list    Sherlyn Giron MA

## 2017-09-15 DIAGNOSIS — G89.4 CHRONIC PAIN SYNDROME: ICD-10-CM

## 2017-09-15 DIAGNOSIS — G35 MS (MULTIPLE SCLEROSIS) (H): ICD-10-CM

## 2017-09-15 NOTE — TELEPHONE ENCOUNTER
Reason for Call:  Medication or medication refill:    Do you use a [facility]?  Name of the pharmacy and phone number for the current request: [facility]     Name of the medication requested: Oxycodone    Can we leave a detailed message on this number? YES    Phone number patient can be reached at: Home number on file [phone] (home)    Best Time: Anytime    Call taken on 9/15/2017 at 1:48 PM by Tiffany Wood

## 2017-09-15 NOTE — TELEPHONE ENCOUNTER
Controlled Substance Refill Request for Oxycodone  Problem List Complete:  Yes    Last Written Prescription Date:  8/21/2017  Last Fill Quantity: 120,   # refills: 0    Last Office Visit with [facility] primary care provider: 8/18/2017    Clinic visit frequency required: Q 3 months     Future Office visit:     Controlled substance agreement on file: Yes:  Date 6/9/2016 with pain management.     Processing:  Patient will  in clinic     checked in past 6 months?  Yes 6/15/2017       LILLIANA Deng, RN, PHN  [facility] [phone]

## 2017-09-16 RX ORDER — OXYCODONE HYDROCHLORIDE 15 MG/1
7.5-15 TABLET ORAL EVERY 4 HOURS PRN
Qty: 120 TABLET | Refills: 0 | Status: SHIPPED | OUTPATIENT
Start: 2017-09-20 | End: 2017-09-19

## 2017-09-18 ENCOUNTER — TELEPHONE (OUTPATIENT)
Dept: FAMILY MEDICINE | Facility: CLINIC | Age: 52
End: 2017-09-18

## 2017-09-18 DIAGNOSIS — G35 MS (MULTIPLE SCLEROSIS) (H): ICD-10-CM

## 2017-09-18 DIAGNOSIS — G89.4 CHRONIC PAIN SYNDROME: ICD-10-CM

## 2017-09-18 NOTE — TELEPHONE ENCOUNTER
Pt calling wondering why their Oxycodone script is put to start 9/20/17.     had picked up at the clinic.      Pulled  as well incase wanting to review. Noted as well that August has 31 days in it which probably was a factor.     Routing to PCP for further review/recommendations/orders.  Cheryl Garner RN  [facility]

## 2017-09-19 RX ORDER — OXYCODONE HYDROCHLORIDE 15 MG/1
7.5-15 TABLET ORAL EVERY 4 HOURS PRN
Qty: 4 TABLET | Refills: 0 | Status: SHIPPED | OUTPATIENT
Start: 2017-09-20 | End: 2017-10-16

## 2017-09-19 NOTE — TELEPHONE ENCOUNTER
RN advised patient of TS note below.  She said today is her 30th day and she has only 1 pill left at home.  She said she dropped one pill on the floor and ran it over with her wheelchair so she is down 1 pill.  She said she picked up last rx on 8/21 and took 4 pills that day.  So if she started on 8/21 then today would be the 30th day by my calculation.     She is requesting that message be sent back to him to review again.      LILLIANA Deng, RN, N  [facility]) [phone]

## 2017-09-19 NOTE — TELEPHONE ENCOUNTER
Pt calling back     Advised pt on the information below     Pt stated that she will be out of the pills tonight. At 8 pm.     Then she takes a pill at 2 am then again at 8 am before the pharmacy opens - she is afraid of withdrawals   Rn advised that she will not have withdrawal symptoms if she misses 2 doses     Message handled by Nurse Triage with Hudrina - provider name: MD Jenifer . - pt can pick it up tomorrow at her pharmacy      Cordelia Stevens RN, BSN  [facility]

## 2017-09-19 NOTE — TELEPHONE ENCOUNTER
Pt calling to check on this.  States that today is 30 days from 8/21/2017 and does not want to run out of medication. States she takes her first dose of this medication at 1AM or 2AM and she is out of medication today.  She states she will miss 2 doses as she takes the one dose at 1AM and then another dose at 7AM. Asking for a call back at [phone].  OK to leave detailed message.  Savana Madison

## 2017-09-19 NOTE — TELEPHONE ENCOUNTER
huddled with MD BOSTON - he will not FILL oxycodone again until 10/20/2017 and she needs to go back to the pain clinic     Called # below     Advised pt on the above multiple times     Patient stated an understanding and agreed with plan.    Pt asked that the script be walked over to the  pharmacy     Cordelia Stevens RN, BSN  [place] [facility]

## 2017-09-19 NOTE — TELEPHONE ENCOUNTER
Last refill was completed on 8/21/17, with August having 31 days, therefore refill is due on 9/20/17, each prescription is for 30 days

## 2017-09-19 NOTE — TELEPHONE ENCOUNTER
Will route to  for review.    Chandrika Nathan, LILLIANA, RN, N  [facility]) [phone]

## 2017-09-28 ENCOUNTER — TELEPHONE (OUTPATIENT)
Dept: FAMILY MEDICINE | Facility: CLINIC | Age: 52
End: 2017-09-28

## 2017-09-28 ENCOUNTER — RADIANT APPOINTMENT (OUTPATIENT)
Dept: GENERAL RADIOLOGY | Facility: CLINIC | Age: 52
End: 2017-09-28
Attending: PHYSICIAN ASSISTANT
Payer: COMMERCIAL

## 2017-09-28 ENCOUNTER — OFFICE VISIT (OUTPATIENT)
Dept: FAMILY MEDICINE | Facility: CLINIC | Age: 52
End: 2017-09-28
Payer: COMMERCIAL

## 2017-09-28 VITALS
SYSTOLIC BLOOD PRESSURE: 131 MMHG | TEMPERATURE: 97.6 F | WEIGHT: 250 LBS | DIASTOLIC BLOOD PRESSURE: 71 MMHG | OXYGEN SATURATION: 96 % | BODY MASS INDEX: 37.03 KG/M2 | HEART RATE: 106 BPM | HEIGHT: 69 IN | RESPIRATION RATE: 14 BRPM

## 2017-09-28 DIAGNOSIS — G89.4 CHRONIC PAIN SYNDROME: Chronic | ICD-10-CM

## 2017-09-28 DIAGNOSIS — M48.061 SPINAL STENOSIS OF LUMBAR REGION, UNSPECIFIED WHETHER NEUROGENIC CLAUDICATION PRESENT: ICD-10-CM

## 2017-09-28 DIAGNOSIS — G35 MS (MULTIPLE SCLEROSIS) (H): ICD-10-CM

## 2017-09-28 DIAGNOSIS — R05.9 COUGH: ICD-10-CM

## 2017-09-28 DIAGNOSIS — Z23 FLU VACCINE NEED: ICD-10-CM

## 2017-09-28 DIAGNOSIS — N30.01 ACUTE CYSTITIS WITH HEMATURIA: ICD-10-CM

## 2017-09-28 DIAGNOSIS — R80.9 MICROALBUMINURIA: ICD-10-CM

## 2017-09-28 DIAGNOSIS — J18.9 PNEUMONIA OF RIGHT LOWER LOBE DUE TO INFECTIOUS ORGANISM: ICD-10-CM

## 2017-09-28 DIAGNOSIS — C85.81 OTHER SPECIFIED TYPE OF NON-HODGKIN LYMPHOMA OF HEAD (H): ICD-10-CM

## 2017-09-28 DIAGNOSIS — F32.A MODERATE DEPRESSIVE EPISODE: ICD-10-CM

## 2017-09-28 DIAGNOSIS — R82.90 NONSPECIFIC FINDING ON EXAMINATION OF URINE: ICD-10-CM

## 2017-09-28 DIAGNOSIS — I10 HYPERTENSION GOAL BP (BLOOD PRESSURE) < 140/90: ICD-10-CM

## 2017-09-28 DIAGNOSIS — E66.01 SEVERE OBESITY WITH BODY MASS INDEX (BMI) OF 35.0 TO 35.9 AND COMORBIDITY (H): ICD-10-CM

## 2017-09-28 DIAGNOSIS — Z01.818 PREOPERATIVE EXAMINATION: Primary | ICD-10-CM

## 2017-09-28 DIAGNOSIS — M62.838 MUSCLE SPASMS OF BOTH LOWER EXTREMITIES: ICD-10-CM

## 2017-09-28 DIAGNOSIS — Z23 NEED FOR PROPHYLACTIC VACCINATION AND INOCULATION AGAINST INFLUENZA: ICD-10-CM

## 2017-09-28 PROBLEM — R25.2 SPASM: Status: RESOLVED | Noted: 2017-01-22 | Resolved: 2017-09-28

## 2017-09-28 LAB
ALBUMIN SERPL-MCNC: 3.1 G/DL (ref 3.4–5)
ALBUMIN UR-MCNC: 100 MG/DL
ALP SERPL-CCNC: 103 U/L (ref 40–150)
ALT SERPL W P-5'-P-CCNC: 38 U/L (ref 0–50)
ANION GAP SERPL CALCULATED.3IONS-SCNC: 6 MMOL/L (ref 3–14)
APPEARANCE UR: CLEAR
AST SERPL W P-5'-P-CCNC: 17 U/L (ref 0–45)
BACTERIA #/AREA URNS HPF: ABNORMAL /HPF
BILIRUB SERPL-MCNC: 0.3 MG/DL (ref 0.2–1.3)
BILIRUB UR QL STRIP: NEGATIVE
BUN SERPL-MCNC: 20 MG/DL (ref 7–30)
CALCIUM SERPL-MCNC: 9.1 MG/DL (ref 8.5–10.1)
CHLORIDE SERPL-SCNC: 103 MMOL/L (ref 94–109)
CO2 SERPL-SCNC: 31 MMOL/L (ref 20–32)
COLOR UR AUTO: YELLOW
CREAT SERPL-MCNC: 0.59 MG/DL (ref 0.52–1.04)
ERYTHROCYTE [DISTWIDTH] IN BLOOD BY AUTOMATED COUNT: 14.9 % (ref 10–15)
GFR SERPL CREATININE-BSD FRML MDRD: >90 ML/MIN/1.7M2
GLUCOSE SERPL-MCNC: 150 MG/DL (ref 70–99)
GLUCOSE UR STRIP-MCNC: NEGATIVE MG/DL
HCT VFR BLD AUTO: 41 % (ref 35–47)
HGB BLD-MCNC: 13.6 G/DL (ref 11.7–15.7)
HGB UR QL STRIP: ABNORMAL
KETONES UR STRIP-MCNC: NEGATIVE MG/DL
LEUKOCYTE ESTERASE UR QL STRIP: ABNORMAL
MCH RBC QN AUTO: 27.9 PG (ref 26.5–33)
MCHC RBC AUTO-ENTMCNC: 33.2 G/DL (ref 31.5–36.5)
MCV RBC AUTO: 84 FL (ref 78–100)
NITRATE UR QL: POSITIVE
PH UR STRIP: 5.5 PH (ref 5–7)
PLATELET # BLD AUTO: 348 10E9/L (ref 150–450)
POTASSIUM SERPL-SCNC: 3.7 MMOL/L (ref 3.4–5.3)
PROT SERPL-MCNC: 7.3 G/DL (ref 6.8–8.8)
RADIOLOGIST FLAGS: ABNORMAL
RBC # BLD AUTO: 4.88 10E12/L (ref 3.8–5.2)
RBC #/AREA URNS AUTO: ABNORMAL /HPF
SODIUM SERPL-SCNC: 140 MMOL/L (ref 133–144)
SOURCE: ABNORMAL
SP GR UR STRIP: 1.02 (ref 1–1.03)
UROBILINOGEN UR STRIP-ACNC: 0.2 EU/DL (ref 0.2–1)
WBC # BLD AUTO: 18.1 10E9/L (ref 4–11)
WBC #/AREA URNS AUTO: ABNORMAL /HPF

## 2017-09-28 PROCEDURE — 36415 COLL VENOUS BLD VENIPUNCTURE: CPT | Performed by: PHYSICIAN ASSISTANT

## 2017-09-28 PROCEDURE — 87086 URINE CULTURE/COLONY COUNT: CPT | Performed by: PHYSICIAN ASSISTANT

## 2017-09-28 PROCEDURE — 90471 IMMUNIZATION ADMIN: CPT | Performed by: PHYSICIAN ASSISTANT

## 2017-09-28 PROCEDURE — 81001 URINALYSIS AUTO W/SCOPE: CPT | Performed by: PHYSICIAN ASSISTANT

## 2017-09-28 PROCEDURE — 99215 OFFICE O/P EST HI 40 MIN: CPT | Mod: 25 | Performed by: PHYSICIAN ASSISTANT

## 2017-09-28 PROCEDURE — 85027 COMPLETE CBC AUTOMATED: CPT | Performed by: PHYSICIAN ASSISTANT

## 2017-09-28 PROCEDURE — 80053 COMPREHEN METABOLIC PANEL: CPT | Performed by: PHYSICIAN ASSISTANT

## 2017-09-28 PROCEDURE — 82043 UR ALBUMIN QUANTITATIVE: CPT | Performed by: PHYSICIAN ASSISTANT

## 2017-09-28 PROCEDURE — 71010 XR CHEST 1 VW: CPT

## 2017-09-28 PROCEDURE — 90688 IIV4 VACCINE SPLT 0.5 ML IM: CPT | Performed by: PHYSICIAN ASSISTANT

## 2017-09-28 RX ORDER — CEFDINIR 300 MG/1
300 CAPSULE ORAL 2 TIMES DAILY
Qty: 20 CAPSULE | Refills: 0 | Status: SHIPPED | OUTPATIENT
Start: 2017-09-28 | End: 2017-10-08

## 2017-09-28 NOTE — MR AVS SNAPSHOT
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)        Severe obesity with body mass index (BMI) of 35.0 to 35.9 and comorbidity (H)        Hypertension goal BP (blood pressure) < 140/90        Moderate depressive episode (H)        Muscle spasms of both lower extremities        Nonspecific finding on examination of urine        Cough        Need for prophylactic vaccination and inoculation against influenza        Flu vaccine need        Microalbuminuria        Pneumonia of right lower lobe due to infectious organism (H)        Acute cystitis with hematuria           Follow-ups after your visit        Who to contact     If you have questions or need follow up information about today's clinic visit or your schedule please contact [facility] directly at [phone].  Normal or non-critical lab and imaging results will be communicated to you by eOn Communicationshart, letter or phone within 4 business days after the clinic has received the results. If you do not hear from us within 7 days, please contact the clinic through eOn Communications[employer] or phone. If you have a critical or abnormal lab result, we will notify you by phone as soon as possible.  Submit refill requests through [employer] or call your pharmacy and they will forward the refill request to us. Please allow 3 business days for your refill to be completed.          Additional Information About Your Visit        [employer] Information     [employer] gives you secure  "access to your electronic health record. If you see a primary care provider, you can also send messages to your care team and make appointments. If you have questions, please call your primary care clinic.  If you do not have a primary care provider, please call [phone] and they will assist you.        Care EveryWhere ID     This is your Care EveryWhere ID. This could be used by other organizations to access your [facility] medical records  [id number]        Your Vitals Were     Pulse Temperature Respirations Height Last Period Pulse Oximetry    106 97.6  F (36.4  C) (Tympanic) 14 5' 9\" (1.753 m) 12/11/2011 96%    Breastfeeding? BMI (Body Mass Index)                No 36.92 kg/m2           Blood Pressure from Last 3 Encounters:   10/02/17 132/76   09/28/17 131/71   08/18/17 134/86    Weight from Last 3 Encounters:   10/02/17 250 lb (113.4 kg)   09/28/17 250 lb (113.4 kg)   08/18/17 241 lb (109.3 kg)              We Performed the Following     *UA reflex to Microscopic and Culture ([place] [facility] (except [place] and [place])     ADMIN 1st VACCINE     Albumin Random Urine Quantitative with Creat Ratio     C FLU VAC QUADRIVALENT SPLIT VIRUS 3+YRS IM     CBC with platelets     Comprehensive metabolic panel     Urine Culture Aerobic Bacterial     Urine Microscopic          Today's Medication Changes          These changes are accurate as of: 9/28/17 11:59 PM.  If you have any questions, ask your nurse or doctor.               Start taking these medicines.        Dose/Directions    cefdinir 300 MG capsule   Commonly known as:  OMNICEF   Used for:  Pneumonia of right lower lobe due to infectious organism (H), Acute cystitis with hematuria   Started by:  Yanna Dugan PA-C        Dose:  300 mg   Take 1 capsule (300 mg) by mouth 2 times daily for 10 days   Quantity:  20 capsule   Refills:  0            Where to get your medicines      These medications were sent to [id number] - " [place], [place] - [address]  [address] [address]     Phone:  [phone]     cefdinir 300 MG capsule                Primary Care Provider Office Phone # Fax #    Julius Hassan [phone]       [address] [facility] [address]        Goals        General    I will continue to work with home care until discharge goals are met. (pt-stated)     Notes - Note edited  10/28/2015  3:09 PM by Cordelia Moreland RN    As of today's date 9/22/2015 goal is met at 76 - 100%.   Goal Status:  Ongoing  As of today's date 10/28/2015 goal is met at 76 - 100%.   Goal Status:  Complete            Equal Access to Services     [facility] Hospital[facility]UNCION : Haddorcas sylvestero Sokelly, waaxda luqadaha, qaybta kaalmada adeegyada, marco chavira . So [facility] [phone].    ATENCIÓN: Si habla español, tiene a woods disposición servicios gratuitos de asistencia lingüística. Llame al [phone].    We comply with applicable federal civil rights laws and [place] laws. We do not discriminate on the basis of race, color, national origin, age, disability, sex, sexual orientation, or gender identity.            Thank you!     Thank you for choosing [facility]  for your care. Our goal is always to provide you with excellent care. Hearing back from our patients is one way we can continue to improve our services. Please take a few minutes to complete the written survey that you may receive in the mail after your visit with us. Thank you!             Your Updated Medication List - Protect others around you: Learn how to safely use, store and throw away your medicines at www.disposemymeds.org.          This list is accurate as of: 9/28/17 11:59 PM.  Always use your most recent med list.                   Brand Name Dispense Instructions for use Diagnosis    amitriptyline 100 MG tablet    ELAVIL    90 tablet    Take 1 tablet (100 mg) by mouth At Bedtime    Lumbar  radiculopathy, Chronic pain syndrome, MS (multiple sclerosis) (H), Moderate depressive episode (H)       ASPIRIN PO      Take 162 mg by mouth daily        ATIVAN 0.5 MG tablet   Generic drug:  LORazepam     60 tablet    Take one dose at bedtime. May have 2 other doses every 6 hours if needed for muscle spasms. Max of 3 tabs per day. Do not take if sleepy.    Muscle spasms of both lower extremities       baclofen 10 MG tablet    LIORESAL    120 tablet    Take 1 tablet (10 mg) by mouth 3 times daily May take an additional 10 mg as needed qd for total of 40 mg per day    Chronic pain of both lower extremities       cefdinir 300 MG capsule    OMNICEF    20 capsule    Take 1 capsule (300 mg) by mouth 2 times daily for 10 days    Pneumonia of right lower lobe due to infectious organism (H), Acute cystitis with hematuria       DULoxetine 30 MG EC capsule    CYMBALTA    270 capsule    TAKE ONE CAPSULE BY MOUTH IN THE MORNING AND TWO IN THE EVENING    Lumbar radiculopathy, MS (multiple sclerosis) (H), Chronic pain syndrome, Moderate depressive episode (H)       gabapentin 600 MG tablet    NEURONTIN    270 tablet    Take 1 tablet (600 mg) by mouth 3 times daily May take an additional 300 mg per day as needed    Chronic pain of both lower extremities       lidocaine 5 % Patch    LIDODERM    30 patch    Apply 1  patches to low back and right buttock ( cut to fit)  area at once for up to 12 h within a 24 h period.  Remove after 12 hours.    Lumbar radiculopathy, Muscle spasms of both lower extremities       losartan-hydrochlorothiazide 100-25 MG per tablet    HYZAAR    90 tablet    TAKE ONE TABLET BY MOUTH ONCE DAILY    Hypertension goal BP (blood pressure) < 140/90       metoprolol 50 MG 24 hr tablet    TOPROL-XL    90 tablet    TAKE ONE TABLET BY MOUTH ONCE DAILY    Hypertension goal BP (blood pressure) < 140/90       MULTI-VITAMIN PO      Take 1 tablet by mouth daily        omega-3 fatty acids 1200 MG capsule      Take 1  capsule by mouth daily.        oxyCODONE 15 MG IR tablet    ROXICODONE    4 tablet    Take 0.5-1 tablets (7.5-15 mg) by mouth every 4 hours as needed for moderate to severe pain Limit 4 tablet(s) per day.    MS (multiple sclerosis) (H), Chronic pain syndrome       VITAMIN D PO      Take 1 tablet by mouth daily 1000 IU daily

## 2017-09-28 NOTE — PROGRESS NOTES
Results discussed directly with patient while patient was present. Any further details documented in the note.   Yanna Dugan PA-C

## 2017-09-28 NOTE — TELEPHONE ENCOUNTER
RN called [facility] at [phone].    Detailed message left for RN pre-assessment line with message below and asked them to call us back with phone and fax number.   Will try again tomorrow if no call back is received.      LILLIANA Deng, RN, N  [facility]) [phone]

## 2017-09-28 NOTE — NURSING NOTE
"Chief Complaint   Patient presents with     Pre-Op Exam       Initial /71  Pulse 106  Temp 97.6  F (36.4  C) (Tympanic)  Resp 14  Ht 5' 9\" (1.753 m)  Wt 250 lb (113.4 kg)  LMP 12/11/2011  SpO2 96%  Breastfeeding? No  BMI 36.92 kg/m2 Estimated body mass index is 36.92 kg/(m^2) as calculated from the following:    Height as of this encounter: 5' 9\" (1.753 m).    Weight as of this encounter: 250 lb (113.4 kg).  Medication Reconciliation: complete  "

## 2017-09-28 NOTE — TELEPHONE ENCOUNTER
Please call preop at [facility] and inform them that Amanda has a UTI and pneumonia and likely will be cleared for her surgery on Monday but we are going to see her back in the clinic on Monday am (7:40) and will let them know asap after we see her.      Please get the best # to call on Monday and the preferred fax # to send the preop to on Monday.      Yanna Dugan MS, PA-C

## 2017-09-28 NOTE — PROGRESS NOTES
[address] [address]  [phone]  Dept: [phone]    PRE-OP EVALUATION:  Today's date: 2017    Amanda Richardson (: 1965) presents for pre-operative evaluation assessment as requested by Dr. Coyle.  She requires evaluation and anesthesia risk assessment prior to undergoing surgery/procedure for treatment of Baclofen pump.  Proposed procedure: Replace Baclofen Pump    Date of Surgery/ Procedure: 10/02/2017  Time of Surgery/ Procedure: 12:00  Hospital/Surgical Facility: [facility]    Primary Physician: Julius Hassan  Type of Anesthesia Anticipated: General    Patient has a Health Care Directive or Living Will:  NO    1. NO - Do you have a history of heart attack, stroke, stent, bypass or surgery on an artery in the head, neck, heart or legs?  2. NO - Do you ever have any pain or discomfort in your chest?  3. NO - Do you have a history of  Heart Failure?  4. NO - Are you troubled by shortness of breath when: walking on the level, up a slight hill or at night?  5. NO - Do you currently have a cold, bronchitis or other respiratory infection?  6. YES- Do you have a cough, shortness of breath or wheezing?  7. YES - Do you sometimes get pains in the calves of your legs when you walk?  8. NO - Do you or anyone in your family have previous history of blood clots?  9. NO - Do you or does anyone in your family have a serious bleeding problem such as prolonged bleeding following surgeries or cuts?  10. NO - Have you ever had problems with anemia or been told to take iron pills?  11. NO - Have you had any abnormal blood loss such as black, tarry or bloody stools, or abnormal vaginal bleeding?  12. NO - Have you ever had a blood transfusion?  13. NO - Have you or any of your relatives ever had problems with anesthesia?  14. YES- Do you have sleep apnea, excessive snoring or daytime drowsiness?  15. NO - Do you have any prosthetic heart  valves?  16. NO - Do you have prosthetic joints?  17. NO - Is there any chance that you may be pregnant?        HPI:                                                      Brief HPI related to upcoming procedure: Amanda has PMHx significant for spasticity related to MS. She had Baclofen pump placed in April 2017. Srial increases in doses with no response. The company did a test which showed incorrect placement. Therefore, patient is revision of Baclofen. She has historically tried multiple muscle relaxants and narcotics without relief of symptoms.     Cough: Patient reports cough for the last few weeks with associated nasal congestions. Her family has been sick as well. She denies fever, chills. She relates she is unable to tell if she has a UTI because she does not have any sensation. No hx of asthma.    Elevated WBC: WBC of 18.1 in clinic today. Baseline WBC has been around 15 and she has had an extensive workup. Patient has had bone marrow testing done in 2011. She was previously followed by [facility] for Non Hodgkin's Lymphoma that was excised from sinuses in 2011. No further follow up was recommended if recurrence did not occur within 2-3 years.     Hypertension: She is taking metoprolol, losartan-HCTZ as prescribed.    BP Readings from Last 3 Encounters:   09/28/17 131/71   08/18/17 134/86   06/15/17 135/62       Depression/Anxiety: Amanda is currently taking duloxetine, amitriptyline and ativan as prescribed.  PHQ-9 SCORE 5/3/2016 3/3/2017 8/18/2017   Total Score - - -   Total Score 12 9 14     REJI-7 SCORE 5/3/2016 3/3/2017 8/18/2017   Total Score 2 0 2       See problem list for active medical problems.  Problems all longstanding and stable, except as noted/documented.  See ROS for pertinent symptoms related to these conditions.                                                                                                  .    MEDICAL HISTORY:                                                    Patient Active  Problem List    Diagnosis Date Noted     Pain medication agreement- signed Nov 20,2012 11/23/2012     Priority: High     For pain flares-Vicodin # 60 monthly  She is also on baclofen TID.   reviewed 5/2/16       Severe obesity with body mass index (BMI) of 35.0 to 35.9 and comorbidity (H) 09/28/2017     Priority: Medium     Controlled substance agreement signed 08/18/2017     Priority: Medium     Chronic pain syndrome 02/22/2017     Priority: Medium     Patient is followed by Julius Hassan MD  for ongoing prescription of pain medication.  All refills should only be approved by this provider, or covering partner.    Medication(s): Oxycodone 15 mg .   Maximum quantity per month: 120  Clinic visit frequency required: Q3 months     Controlled substance agreement:  Encounter-Level CSA - 06/09/2016:                 Controlled Substance Agreement - Scan on 6/17/2016 10:49 AM : CONTROLLED SUBSTANCE AGREEMENT (below)          Encounter-Level CSA - 06/09/2016:                 Controlled Substance Agreement - Scan on 4/12/2015 10:28 AM : Controlled Medication Agreement 04/03/15 (below)            Pain Clinic evaluation in the past: Yes       Date/Location:   [facility]  Current patient    DIRE Total Score(s): 14  No flowsheet data found.    Last [facility] website verification:  done on 6/15/17   [email or web address]             Leg muscle spasm 01/21/2017     Priority: Medium     Prediabetes      Priority: Medium     Moderate depressive episode (H)      Priority: Medium     Health Care Home 08/25/2015     Priority: Medium       Status:  Home Care  Care Coordinator:  JENNY REYNOLDS    See Letters for [facility] Care Plan  Date:  August 25, 2015           Encounter for long-term current use of medication 05/01/2015     Priority: Medium     Problem list name updated by automated process. Provider to review       Neuralgia, neuritis, and radiculitis, unspecified 04/29/2015     Priority: Medium     Colon polyps       Priority: Medium     tubular adenomas x 2       Lumbar radiculopathy 10/17/2013     Priority: Medium     CT 11/11/3 L 4 superior L4 pedicle screw enters L3-4 disc space. Remaining hardware intact.   No recurrent stenosis at post operative levels with residual chronic stenosis R>L L5-S1.  Healing transverse fracture L4. Chronic anterior wedging of L 4.  Questionable graft subsiderul vs chronic compression Fx of L 4.   11/126/13 L5 nerve block # 1pain per procedure 9 post procedure 8.   Saba Worley R.N., B.C.,C.A.N.P  [facility]           Ex-smoker 08/08/2013     Priority: Medium     Nonallopathic lesion of thoracic region 09/24/2012     Priority: Medium     Problem list name updated by automated process. Provider to review       Nonallopathic lesion of cervical region 09/24/2012     Priority: Medium     Problem list name updated by automated process. Provider to review       Abnormality of gait 07/27/2012     Priority: Medium     Lumbago 07/20/2012     Priority: Medium     Treated with epidural Aug 2012       Spinal stenosis, lumbar 06/17/2012     Priority: Medium     10/17/14 anterior posterior fusion with decompression L4-S1 with pelvic fixation.               MS (multiple sclerosis) (H) 06/11/2012     Priority: Medium     Dis ab- Dec 2011- permanent dis ab         Non Hodgkin's lymphoma (H) 06/11/2012     Priority: Medium     Leukocytosis 06/11/2012     Priority: Medium     With normal bone marrow testing.         Gallstone 06/11/2012     Priority: Medium     Incidental finding on CT scan        Hypertension goal BP (blood pressure) < 140/90 06/11/2012     Priority: Medium      Past Medical History:   Diagnosis Date     Abnormality of gait 7/27/2012     CARDIOVASCULAR SCREENING; LDL GOAL LESS THAN 100      Chronic pain     [facility]     Colon polyps 1/15    tubular adenomas x 2     Gallstone 6/11/2012     Hypertension goal BP (blood pressure) < 140/90      Leukocytosis  6/11/2012     Moderate depressive episode (H)      MS (multiple sclerosis) (H) 2003    Dr Vigil, Dr Green     Non Hodgkin's lymphoma (H) 06/11/2012    Dr Erickson - [facility] - T cell     Nonallopathic lesion of cervical region, not elsewhere classified 9/24/2012     Nonallopathic lesion of thoracic region, not elsewhere classified 9/24/2012     Numbness and tingling     From MS Feet, hands and around the waist line.     Obesity 6/11/2012     Pain in joint, pelvic region and thigh 7/20/2012     Prediabetes      Spinal stenosis, lumbar 6/17/2012     Tobacco abuse 06/11/2012    former     Past Surgical History:   Procedure Laterality Date     COLONOSCOPY N/A 1/7/2015    tubular adenomas x 2 - due 5 yrs     FUSION LUMBAR ANTERIOR, FUSION LUMBAR POSTERIOR TWO LEVELS, COMBINED  10/17/2013    lumbar fusion - Dr Floyd     INSERT PUMP BACLOFEN  04/2017    intrathecal baclofen pump implantation     IRRIGATION AND DEBRIDEMENT LOWER EXTREMITY, COMBINED Right 2015    Right ankle I&D d/t infection     OPEN REDUCTION INTERNAL FIXATION ANKLE  5/15    Right Bimalleolar ankle fx ORIF     OPEN REDUCTION INTERNAL FIXATION ANKLE Right 11/2015    Revision due to spasms pulling screws out of ankle     SINUS SURGERY  2011    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     XR LUMBAR EPIDURAL INJECTION INCL IMAGING  3/14    Left L4-5 Epidural Dr Winter     Current Outpatient Prescriptions   Medication Sig Dispense Refill     cefdinir (OMNICEF) 300 MG capsule Take 1 capsule (300 mg) by mouth 2 times daily for 10 days 20 capsule 0     oxyCODONE (ROXICODONE) 15 MG IR tablet Take 0.5-1 tablets (7.5-15 mg) by mouth every 4 hours as needed for moderate to severe pain Limit 4 tablet(s) per day. 4 tablet 0     losartan-hydrochlorothiazide (HYZAAR) 100-25 MG per tablet TAKE ONE TABLET BY MOUTH ONCE DAILY 90 tablet 0     metoprolol (TOPROL-XL) 50 MG 24 hr tablet TAKE ONE TABLET BY MOUTH ONCE DAILY 90 tablet 0     DULoxetine  (CYMBALTA) 30 MG EC capsule TAKE ONE CAPSULE BY MOUTH IN THE MORNING AND TWO IN THE EVENING 270 capsule 3     gabapentin (NEURONTIN) 600 MG tablet Take 1 tablet (600 mg) by mouth 3 times daily May take an additional 300 mg per day as needed 270 tablet 1     baclofen (LIORESAL) 10 MG tablet Take 1 tablet (10 mg) by mouth 3 times daily May take an additional 10 mg as needed qd for total of 40 mg per day 120 tablet 1     ATIVAN 0.5 MG tablet Take one dose at bedtime. May have 2 other doses every 6 hours if needed for muscle spasms. Max of 3 tabs per day. Do not take if sleepy. 60 tablet 0     ASPIRIN PO Take 162 mg by mouth daily       amitriptyline (ELAVIL) 100 MG tablet Take 1 tablet (100 mg) by mouth At Bedtime 90 tablet 3     Cholecalciferol (VITAMIN D PO) Take 1 tablet by mouth daily 1000 IU daily       omega-3 fatty acids (FISH OIL) 1200 MG capsule Take 1 capsule by mouth daily.       [DISCONTINUED] DULOXETINE HCL PO Take 30 mg by mouth every morning       [DISCONTINUED] DULOXETINE HCL PO Take 60 mg by mouth every evening       lidocaine (LIDODERM) 5 % patch Apply 1  patches to low back and right buttock ( cut to fit)  area at once for up to 12 h within a 24 h period.  Remove after 12 hours. 30 patch 6     Multiple Vitamin (MULTI-VITAMIN PO) Take 1 tablet by mouth daily        OTC products: None, except as noted above    Allergies   Allergen Reactions     Lisinopril      Lip swelling     Flexeril [Cyclobenzaprine Hcl]      Got confused       Latex Allergy: NO    Social History   Substance Use Topics     Smoking status: Current Every Day Smoker     Packs/day: 1.00     Types: Cigarettes     Last attempt to quit: 8/4/2013     Smokeless tobacco: Never Used     Alcohol use No     History   Drug Use No       REVIEW OF SYSTEMS:                                                    Constitutional, neuro, ENT, endocrine, pulmonary, cardiac, gastrointestinal, genitourinary, musculoskeletal, integument and psychiatric systems  "are negative, except as otherwise noted.    This document serves as a record of the services and decisions personally performed and made by Yanna Dugan PA-C. It was created on her behalf by Cristy Chiang, a trained medical scribe. The creation of this document is based on the provider's statements to the medical scribe.  Cristy Chiang September 28, 2017    EXAM:                                                    /71  Pulse 106  Temp 97.6  F (36.4  C) (Tympanic)  Resp 14  Ht 5' 9\" (1.753 m)  Wt 250 lb (113.4 kg)  LMP 12/11/2011  SpO2 96%  Breastfeeding? No  BMI 36.92 kg/m2    GENERAL APPEARANCE: healthy, alert and no distress     EYES: EOMI, PERRL     HENT: ear canals and TM's normal and nose and mouth without ulcers or lesions     NECK: no adenopathy, no asymmetry, masses, or scars and thyroid normal to palpation     RESP: expiratory rhonchi (R>L) lungs clear to auscultation - no rales, or wheezes  ADDENDUM 10/2/2017 - LUNGS CLEAR      CV: regular rates and rhythm, normal S1 S2, no S3 or S4 and no murmur, click or rub     ABDOMEN:  soft, nontender, no HSM or masses and bowel sounds normal     MS: extremities normal- no gross deformities noted, no evidence of inflammation in joints, FROM in all extremities.     SKIN: no suspicious lesions or rashes     NEURO: Normal strength and tone, sensory exam grossly normal, mentation intact and speech normal     PSYCH: mentation appears normal. and affect normal/bright     LYMPHATICS: No axillary, cervical, or supraclavicular nodes    DIAGNOSTICS:                                                    EKG from 4/10/2017: poor R wave progression otherwise w/o significant change from November 2016    Labs Resulted Today:   Results for orders placed or performed in visit on 09/28/17   CBC with platelets   Result Value Ref Range    WBC 18.1 (H) 4.0 - 11.0 10e9/L    RBC Count 4.88 3.8 - 5.2 10e12/L    Hemoglobin 13.6 11.7 - 15.7 g/dL    Hematocrit 41.0 35.0 - " 47.0 %    MCV 84 78 - 100 fl    MCH 27.9 26.5 - 33.0 pg    MCHC 33.2 31.5 - 36.5 g/dL    RDW 14.9 10.0 - 15.0 %    Platelet Count 348 150 - 450 10e9/L   Comprehensive metabolic panel   Result Value Ref Range    Sodium 140 133 - 144 mmol/L    Potassium 3.7 3.4 - 5.3 mmol/L    Chloride 103 94 - 109 mmol/L    Carbon Dioxide 31 20 - 32 mmol/L    Anion Gap 6 3 - 14 mmol/L    Glucose 150 (H) 70 - 99 mg/dL    Urea Nitrogen 20 7 - 30 mg/dL    Creatinine 0.59 0.52 - 1.04 mg/dL    GFR Estimate >90 >60 mL/min/1.7m2    GFR Estimate If Black >90 >60 mL/min/1.7m2    Calcium 9.1 8.5 - 10.1 mg/dL    Bilirubin Total 0.3 0.2 - 1.3 mg/dL    Albumin 3.1 (L) 3.4 - 5.0 g/dL    Protein Total 7.3 6.8 - 8.8 g/dL    Alkaline Phosphatase 103 40 - 150 U/L    ALT 38 0 - 50 U/L    AST 17 0 - 45 U/L   *UA reflex to Microscopic and Culture ([place] and [facility] (except [place] and [place])   Result Value Ref Range    Color Urine Yellow     Appearance Urine Clear     Glucose Urine Negative NEG^Negative mg/dL    Bilirubin Urine Negative NEG^Negative    Ketones Urine Negative NEG^Negative mg/dL    Specific Gravity Urine 1.025 1.003 - 1.035    Blood Urine Moderate (A) NEG^Negative    pH Urine 5.5 5.0 - 7.0 pH    Protein Albumin Urine 100 (A) NEG^Negative mg/dL    Urobilinogen Urine 0.2 0.2 - 1.0 EU/dL    Nitrite Urine Positive (A) NEG^Negative    Leukocyte Esterase Urine Trace (A) NEG^Negative    Source Midstream Urine    Albumin Random Urine Quantitative with Creat Ratio   Result Value Ref Range    Creatinine Urine 92 mg/dL    Albumin Urine mg/L 496 mg/L    Albumin Urine mg/g Cr 540.30 (H) 0 - 25 mg/g Cr   Urine Microscopic   Result Value Ref Range    WBC Urine 10-25 (A) OTO2^O - 2 /HPF    RBC Urine 5-10 (A) OTO2^O - 2 /HPF    Bacteria Urine Many (A) NEG^Negative /HPF   Urine Culture Aerobic Bacterial   Result Value Ref Range    Specimen Description Midstream Urine     Culture Micro >100,000 colonies/mL  mixed urogenital geovanna       Culture  Micro Susceptibility testing not routinely done      WBC   Date Value Ref Range Status   10/02/2017 14.6 (H) 4.0 - 11.0 10e9/L Final   ]        Recent Labs   Lab Test  04/10/17   1601  01/23/17   2349  01/21/17   0120  05/02/16   1613   10/03/13   1614   HGB  14.0   --   13.1  13.2   < >  12.7   PLT  313  404  325  348   < >  318   INR   --    --    --    --    --   0.93   NA  139   --   139  140   < >  140   POTASSIUM  3.7   --   3.5  3.7   < >  3.7   CR  0.48*   --   0.44*  0.54   < >  0.52   A1C   --    --    --   6.6*   --    --     < > = values in this interval not displayed.      Recent Results (from the past 744 hour(s))   XR Chest 1 View   Result Value    Radiologist flags See IMPRESSION (Urgent)    Narrative    CHEST ONE VIEW  9/28/2017 3:07 PM     HISTORY: Cough    COMPARISON: 10/3/2013      Impression    IMPRESSION: New right base infiltrate probably right middle lobe.  Remaining lungs clear.    [Access Center: See IMPRESSION]    This report will be copied to the [facility] to ensure a  provider acknowledges the finding. Access Center is available Monday  through Friday 8am-3:30 pm.     DERECK MIRANDA MD         IMPRESSION:                                                    Reason for surgery/procedure: Lower extremity spasms, replacement of Baclofen pump  Diagnosis/reason for consult:  Preoperative risk assessment.    The proposed surgical procedure is considered INTERMEDIATE risk.    REVISED CARDIAC RISK INDEX  The patient has the following serious cardiovascular risks for perioperative complications such as (MI, PE, VFib and 3  AV Block):  No serious cardiac risks  INTERPRETATION: 0 risks: Class I (very low risk - 0.4% complication rate)    The patient has the following additional risks for perioperative complications:  Currently being treated for UTI and pneumonia.  (clinically improved 10/2/2017)  Tobacco abuse  Immunocompromised status      ICD-10-CM    1. Preoperative examination Z01.818  CBC with platelets     Comprehensive metabolic panel     *UA reflex to Microscopic and Culture ([place] and [facility] (except [place] and [place])     Urine Microscopic   2. Chronic pain syndrome G89.4    3. Spinal stenosis, lumbar M48.06    4. MS (multiple sclerosis) (H) G35    5. 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 Severe obesity with body mass index (BMI) of 35.0 to 35.9 and comorbidity (H) E66.01     Z68.35    7. Hypertension goal BP (blood pressure) < 140/90 I10    8. Moderate depressive episode (H) F32.1    9. Muscle spasms of both lower extremities M62.838    10. Nonspecific finding on examination of urine R82.90 Urine Culture Aerobic Bacterial   11. Cough R05 CANCELED: XR Chest 2 Views   12. Need for prophylactic vaccination and inoculation against influenza Z23    13. Flu vaccine need Z23 C FLU VAC QUADRIVALENT SPLIT VIRUS 3+YRS IM     ADMIN 1st VACCINE   14. Microalbuminuria R80.9 Albumin Random Urine Quantitative with Creat Ratio   15. Pneumonia of right lower lobe due to infectious organism (H) J18.1 cefdinir (OMNICEF) 300 MG capsule   16. Acute cystitis with hematuria N30.01 cefdinir (OMNICEF) 300 MG capsule       RECOMMENDATIONS:                                                    --Patient is to take all scheduled medications on the day of surgery EXCEPT for: Losartan, aspirin, and all supplements including fish oil    APPROVAL GIVEN to proceed with proposed procedure, without further diagnostic evaluation     The information in this document, created by the medical scribe for me, accurately reflects the services I personally performed and the decisions made by me. I have reviewed and approved this document for accuracy prior to leaving the patient care area.  September 28, 2017 3:02 PM    Signed Electronically by: DAYAN Abad MD, FAAFP    [address]  [address] [phone]  [phone] Fax      Copy of this evaluation report is provided to requesting physician.    [facility]

## 2017-09-29 LAB
BACTERIA SPEC CULT: NORMAL
BACTERIA SPEC CULT: NORMAL
CREAT UR-MCNC: 92 MG/DL
MICROALBUMIN UR-MCNC: 496 MG/L
MICROALBUMIN/CREAT UR: 540.3 MG/G CR (ref 0–25)
SPECIMEN SOURCE: NORMAL

## 2017-09-29 NOTE — TELEPHONE ENCOUNTER
Yanna do you want us to fax the pre-op today?  See note below.  I know you wanted to wait until Monday to fax.    Chandrika Nathan, LILLIANA, RN, N  [facility]) [phone]

## 2017-09-29 NOTE — TELEPHONE ENCOUNTER
Reason for Call:  Other     Detailed comments: Saba from [facility] is calling back. She was asking for the preop. The fax number is [phone].     Call taken on 9/29/2017 at 9:07 AM by Tiffany Wood

## 2017-10-02 ENCOUNTER — OFFICE VISIT (OUTPATIENT)
Dept: FAMILY MEDICINE | Facility: CLINIC | Age: 52
End: 2017-10-02
Payer: COMMERCIAL

## 2017-10-02 VITALS
SYSTOLIC BLOOD PRESSURE: 132 MMHG | BODY MASS INDEX: 37.03 KG/M2 | WEIGHT: 250 LBS | HEART RATE: 86 BPM | DIASTOLIC BLOOD PRESSURE: 76 MMHG | RESPIRATION RATE: 14 BRPM | TEMPERATURE: 98.3 F | OXYGEN SATURATION: 95 % | HEIGHT: 69 IN

## 2017-10-02 DIAGNOSIS — J18.9 PNEUMONIA OF RIGHT LOWER LOBE DUE TO INFECTIOUS ORGANISM: Primary | ICD-10-CM

## 2017-10-02 DIAGNOSIS — R73.03 PREDIABETES: ICD-10-CM

## 2017-10-02 DIAGNOSIS — R80.9 MICROALBUMINURIA: ICD-10-CM

## 2017-10-02 DIAGNOSIS — N30.01 ACUTE CYSTITIS WITH HEMATURIA: ICD-10-CM

## 2017-10-02 LAB
ALBUMIN UR-MCNC: 30 MG/DL
APPEARANCE UR: CLEAR
BACTERIA #/AREA URNS HPF: ABNORMAL /HPF
BASOPHILS # BLD AUTO: 0 10E9/L (ref 0–0.2)
BASOPHILS NFR BLD AUTO: 0.3 %
BILIRUB UR QL STRIP: NEGATIVE
COLOR UR AUTO: YELLOW
CREAT UR-MCNC: 96 MG/DL
DIFFERENTIAL METHOD BLD: ABNORMAL
EOSINOPHIL # BLD AUTO: 0.4 10E9/L (ref 0–0.7)
EOSINOPHIL NFR BLD AUTO: 2.6 %
ERYTHROCYTE [DISTWIDTH] IN BLOOD BY AUTOMATED COUNT: 14.9 % (ref 10–15)
GLUCOSE UR STRIP-MCNC: NEGATIVE MG/DL
HBA1C MFR BLD: 6.5 % (ref 4.3–6)
HCT VFR BLD AUTO: 39.1 % (ref 35–47)
HGB BLD-MCNC: 12.8 G/DL (ref 11.7–15.7)
HGB UR QL STRIP: ABNORMAL
KETONES UR STRIP-MCNC: NEGATIVE MG/DL
LEUKOCYTE ESTERASE UR QL STRIP: NEGATIVE
LYMPHOCYTES # BLD AUTO: 2.5 10E9/L (ref 0.8–5.3)
LYMPHOCYTES NFR BLD AUTO: 17.2 %
MCH RBC QN AUTO: 27.6 PG (ref 26.5–33)
MCHC RBC AUTO-ENTMCNC: 32.7 G/DL (ref 31.5–36.5)
MCV RBC AUTO: 84 FL (ref 78–100)
MICROALBUMIN UR-MCNC: 121 MG/L
MICROALBUMIN/CREAT UR: 125.91 MG/G CR (ref 0–25)
MONOCYTES # BLD AUTO: 0.8 10E9/L (ref 0–1.3)
MONOCYTES NFR BLD AUTO: 5.1 %
NEUTROPHILS # BLD AUTO: 10.9 10E9/L (ref 1.6–8.3)
NEUTROPHILS NFR BLD AUTO: 74.8 %
NITRATE UR QL: NEGATIVE
NON-SQ EPI CELLS #/AREA URNS LPF: ABNORMAL /LPF
PH UR STRIP: 6 PH (ref 5–7)
PLATELET # BLD AUTO: 330 10E9/L (ref 150–450)
RBC # BLD AUTO: 4.64 10E12/L (ref 3.8–5.2)
RBC #/AREA URNS AUTO: ABNORMAL /HPF
SOURCE: ABNORMAL
SP GR UR STRIP: 1.01 (ref 1–1.03)
UROBILINOGEN UR STRIP-ACNC: 1 EU/DL (ref 0.2–1)
WBC # BLD AUTO: 14.6 10E9/L (ref 4–11)
WBC #/AREA URNS AUTO: ABNORMAL /HPF

## 2017-10-02 PROCEDURE — 83036 HEMOGLOBIN GLYCOSYLATED A1C: CPT | Performed by: PHYSICIAN ASSISTANT

## 2017-10-02 PROCEDURE — 85025 COMPLETE CBC W/AUTO DIFF WBC: CPT | Performed by: PHYSICIAN ASSISTANT

## 2017-10-02 PROCEDURE — 81001 URINALYSIS AUTO W/SCOPE: CPT | Performed by: PHYSICIAN ASSISTANT

## 2017-10-02 PROCEDURE — 82043 UR ALBUMIN QUANTITATIVE: CPT | Performed by: PHYSICIAN ASSISTANT

## 2017-10-02 PROCEDURE — 99214 OFFICE O/P EST MOD 30 MIN: CPT | Performed by: PHYSICIAN ASSISTANT

## 2017-10-02 PROCEDURE — 36415 COLL VENOUS BLD VENIPUNCTURE: CPT | Performed by: PHYSICIAN ASSISTANT

## 2017-10-02 NOTE — PROGRESS NOTES
SUBJECTIVE:   Amanda Richardson is a 52 year old female who presents to clinic today for the following health issues:              Problem list and histories reviewed & adjusted, as indicated.  Additional history: as documented    Patient Active Problem List   Diagnosis     MS (multiple sclerosis) (H)     Non Hodgkin's lymphoma (H)     Leukocytosis     Gallstone     Hypertension goal BP (blood pressure) < 140/90     Spinal stenosis, lumbar     Lumbago     Abnormality of gait     Nonallopathic lesion of thoracic region     Nonallopathic lesion of cervical region     Pain medication agreement- signed Nov 20,2012     Ex-smoker     Lumbar radiculopathy     Colon polyps     Neuralgia, neuritis, and radiculitis, unspecified     Encounter for long-term current use of medication     Health Care Home     Moderate depressive episode (H)     Prediabetes     Leg muscle spasm     Chronic pain syndrome     Controlled substance agreement signed     Severe obesity with body mass index (BMI) of 35.0 to 35.9 and comorbidity (H)     Past Surgical History:   Procedure Laterality Date     COLONOSCOPY N/A 1/7/2015    tubular adenomas x 2 - due 5 yrs     FUSION LUMBAR ANTERIOR, FUSION LUMBAR POSTERIOR TWO LEVELS, COMBINED  10/17/2013    lumbar fusion - Dr Floyd     INSERT PUMP BACLOFEN  04/2017    intrathecal baclofen pump implantation     IRRIGATION AND DEBRIDEMENT LOWER EXTREMITY, COMBINED Right 2015    Right ankle I&D d/t infection     OPEN REDUCTION INTERNAL FIXATION ANKLE  5/15    Right Bimalleolar ankle fx ORIF     OPEN REDUCTION INTERNAL FIXATION ANKLE Right 11/2015    Revision due to spasms pulling screws out of ankle     SINUS SURGERY  2011    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 Father      with CHF      CANCER Father      ? unsure type - abdominal      Hypertension Mother      Thyroid Disease Mother      goiter      Breast Cancer Sister 58     Thyroid Disease Son      Cancer - colorectal No family hx of          Current Outpatient Prescriptions   Medication Sig Dispense Refill     cefdinir (OMNICEF) 300 MG capsule Take 1 capsule (300 mg) by mouth 2 times daily for 10 days 20 capsule 0     oxyCODONE (ROXICODONE) 15 MG IR tablet Take 0.5-1 tablets (7.5-15 mg) by mouth every 4 hours as needed for moderate to severe pain Limit 4 tablet(s) per day. 4 tablet 0     losartan-hydrochlorothiazide (HYZAAR) 100-25 MG per tablet TAKE ONE TABLET BY MOUTH ONCE DAILY 90 tablet 0     metoprolol (TOPROL-XL) 50 MG 24 hr tablet TAKE ONE TABLET BY MOUTH ONCE DAILY 90 tablet 0     DULoxetine (CYMBALTA) 30 MG EC capsule TAKE ONE CAPSULE BY MOUTH IN THE MORNING AND TWO IN THE EVENING 270 capsule 3     gabapentin (NEURONTIN) 600 MG tablet Take 1 tablet (600 mg) by mouth 3 times daily May take an additional 300 mg per day as needed 270 tablet 1     baclofen (LIORESAL) 10 MG tablet Take 1 tablet (10 mg) by mouth 3 times daily May take an additional 10 mg as needed qd for total of 40 mg per day 120 tablet 1     ATIVAN 0.5 MG tablet Take one dose at bedtime. May have 2 other doses every 6 hours if needed for muscle spasms. Max of 3 tabs per day. Do not take if sleepy. 60 tablet 0     ASPIRIN PO Take 162 mg by mouth daily       amitriptyline (ELAVIL) 100 MG tablet Take 1 tablet (100 mg) by mouth At Bedtime 90 tablet 3     lidocaine (LIDODERM) 5 % patch Apply 1  patches to low back and right buttock ( cut to fit)  area at once for up to 12 h within a 24 h period.  Remove after 12 hours. 30 patch 6     Cholecalciferol (VITAMIN D PO) Take 1 tablet by mouth daily 1000 IU daily       Multiple  "Vitamin (MULTI-VITAMIN PO) Take 1 tablet by mouth daily        omega-3 fatty acids (FISH OIL) 1200 MG capsule Take 1 capsule by mouth daily.       Allergies   Allergen Reactions     Lisinopril      Lip swelling     Flexeril [Cyclobenzaprine Hcl]      Got confused          Reviewed and updated as needed this visit by clinical staff       Reviewed and updated as needed this visit by Provider         ROS:  Constitutional, HEENT, cardiovascular, pulmonary, GI, , musculoskeletal, neuro, skin, endocrine and psych systems are negative, except as otherwise noted.    This document serves as a record of the services and decisions personally performed and made by Yanna Dugan PA-C. It was created on her behalf by Robert Almaraz, a trained medical scribe. The creation of this document is based on the provider's statements to the medical scribe.  Robert Almaraz 7:36 AM October 2, 2017    OBJECTIVE:   LMP 12/11/2011  There is no height or weight on file to calculate BMI.  {Exam List:086196}    {Diagnostic Test Results:823142::\"Diagnostic Test Results:\",\"none \"}    ASSESSMENT/PLAN:             Diagnoses and all orders for this visit:    Microalbuminuria  -     Albumin Random Urine Quantitative with Creat Ratio  -     Hemoglobin A1c    Acute cystitis with hematuria  -     *UA reflex to Microscopic and Culture ([facility] [facility] (except [place] and [place])    Pneumonia of right lower lobe due to infectious organism (H)  -     CBC with platelets and differential        The information in this document, created by the medical scribe for me, accurately reflects the services I personally performed and the decisions made by me. I have reviewed and approved this document for accuracy prior to leaving the patient care area.  October 2, 2017 7:36 AM    Yanna Dugan PA-C  [facility] [place]    "

## 2017-10-02 NOTE — MR AVS SNAPSHOT
"              After Visit Summary   10/2/2017    Amanda Richardson    MRN: [id number]           Patient Information     Date Of Birth          1965        Visit Information        Provider Department      10/2/2017 7:40 AM Yanna Dugan PA-C [facility]        Today's Diagnoses     Pneumonia of right lower lobe due to infectious organism (H)    -  1    Microalbuminuria        Acute cystitis with hematuria        Prediabetes           Follow-ups after your visit        Who to contact     If you have questions or need follow up information about today's clinic visit or your schedule please contact [facility] directly at [phone].  Normal or non-critical lab and imaging results will be communicated to you by [email or web address], letter or phone within 4 business days after the clinic has received the results. If you do not hear from us within 7 days, please contact the clinic through [email or web address] or phone. If you have a critical or abnormal lab result, we will notify you by phone as soon as possible.  Submit refill requests through [facility] or call your pharmacy and they will forward the refill request to us. Please allow 3 business days for your refill to be completed.          Additional Information About Your Visit        MyChart Information     [employer] gives you secure access to your electronic health record. If you see a primary care provider, you can also send messages to your care team and make appointments. If you have questions, please call your primary care clinic.  If you do not have a primary care provider, please call [phone] and they will assist you.        Care EveryWhere ID     This is your Care EveryWhere ID. This could be used by other organizations to access your [facility] medical records  [id number]        Your Vitals Were     Pulse Temperature Respirations Height Last Period Pulse Oximetry    86 98.3  F (36.8  C) (Tympanic) 14 5' 8.5\" (1.74 m) 12/11/2011 95%    " Breastfeeding? BMI (Body Mass Index)                No 37.46 kg/m2           Blood Pressure from Last 3 Encounters:   10/02/17 132/76   09/28/17 131/71   08/18/17 134/86    Weight from Last 3 Encounters:   10/02/17 250 lb (113.4 kg)   09/28/17 250 lb (113.4 kg)   08/18/17 241 lb (109.3 kg)              We Performed the Following     *UA reflex to Microscopic and Culture ([place] [facility] (except [place] and [place])     Albumin Random Urine Quantitative with Creat Ratio     CBC with platelets and differential     Hemoglobin A1c     Urine Microscopic        Primary Care Provider Office Phone # Fax #    Julius Hassan [phone] [address] [facility] [address]        Goals        General    I will continue to work with home care until discharge goals are met. (pt-stated)     Notes - Note edited  10/28/2015  3:09 PM by Cordelia Moreland RN    As of today's date 9/22/2015 goal is met at 76 - 100%.   Goal Status:  Ongoing  As of today's date 10/28/2015 goal is met at 76 - 100%.   Goal Status:  Complete            Equal Access to Services     [facility] AH: Hadii martha swain hadasho Sokirkali, waaxda luqadaha, qaybta kaalmada adeegyada, marco chavira . So [facility] [phone].    ATENCIÓN: Si habla español, tiene a woods disposición servicios gratuitos de asistencia lingüística. Llame al [phone].    We comply with applicable federal civil rights laws and [place] laws. We do not discriminate on the basis of race, color, national origin, age, disability, sex, sexual orientation, or gender identity.            Thank you!     Thank you for choosing [facility]  for your care. Our goal is always to provide you with excellent care. Hearing back from our patients is one way we can continue to improve our services. Please take a few minutes to complete the written survey that you may receive in the mail after your visit with us. Thank you!              Your Updated Medication List - Protect others around you: Learn how to safely use, store and throw away your medicines at www.disposemymeds.org.          This list is accurate as of: 10/2/17  8:31 AM.  Always use your most recent med list.                   Brand Name Dispense Instructions for use Diagnosis    amitriptyline 100 MG tablet    ELAVIL    90 tablet    Take 1 tablet (100 mg) by mouth At Bedtime    Lumbar radiculopathy, Chronic pain syndrome, MS (multiple sclerosis) (H), Moderate depressive episode (H)       ASPIRIN PO      Take 162 mg by mouth daily        ATIVAN 0.5 MG tablet   Generic drug:  LORazepam     60 tablet    Take one dose at bedtime. May have 2 other doses every 6 hours if needed for muscle spasms. Max of 3 tabs per day. Do not take if sleepy.    Muscle spasms of both lower extremities       baclofen 10 MG tablet    LIORESAL    120 tablet    Take 1 tablet (10 mg) by mouth 3 times daily May take an additional 10 mg as needed qd for total of 40 mg per day    Chronic pain of both lower extremities       cefdinir 300 MG capsule    OMNICEF    20 capsule    Take 1 capsule (300 mg) by mouth 2 times daily for 10 days    Pneumonia of right lower lobe due to infectious organism (H), Acute cystitis with hematuria       DULoxetine 30 MG EC capsule    CYMBALTA    270 capsule    TAKE ONE CAPSULE BY MOUTH IN THE MORNING AND TWO IN THE EVENING    Lumbar radiculopathy, MS (multiple sclerosis) (H), Chronic pain syndrome, Moderate depressive episode (H)       gabapentin 600 MG tablet    NEURONTIN    270 tablet    Take 1 tablet (600 mg) by mouth 3 times daily May take an additional 300 mg per day as needed    Chronic pain of both lower extremities       lidocaine 5 % Patch    LIDODERM    30 patch    Apply 1  patches to low back and right buttock ( cut to fit)  area at once for up to 12 h within a 24 h period.  Remove after 12 hours.    Lumbar radiculopathy, Muscle spasms of both lower extremities        losartan-hydrochlorothiazide 100-25 MG per tablet    HYZAAR    90 tablet    TAKE ONE TABLET BY MOUTH ONCE DAILY    Hypertension goal BP (blood pressure) < 140/90       metoprolol 50 MG 24 hr tablet    TOPROL-XL    90 tablet    TAKE ONE TABLET BY MOUTH ONCE DAILY    Hypertension goal BP (blood pressure) < 140/90       MULTI-VITAMIN PO      Take 1 tablet by mouth daily        omega-3 fatty acids 1200 MG capsule      Take 1 capsule by mouth daily.        oxyCODONE 15 MG IR tablet    ROXICODONE    4 tablet    Take 0.5-1 tablets (7.5-15 mg) by mouth every 4 hours as needed for moderate to severe pain Limit 4 tablet(s) per day.    MS (multiple sclerosis) (H), Chronic pain syndrome       VITAMIN D PO      Take 1 tablet by mouth daily 1000 IU daily

## 2017-10-02 NOTE — NURSING NOTE
"Chief Complaint   Patient presents with     RECHECK       Initial /76  Pulse 86  Temp 98.3  F (36.8  C) (Tympanic)  Resp 14  Ht 5' 8.5\" (1.74 m)  Wt 250 lb (113.4 kg)  LMP 12/11/2011  SpO2 95%  Breastfeeding? No  BMI 37.46 kg/m2 Estimated body mass index is 37.46 kg/(m^2) as calculated from the following:    Height as of this encounter: 5' 8.5\" (1.74 m).    Weight as of this encounter: 250 lb (113.4 kg).  Medication Reconciliation: complete   Sherie Bloedow LPN    "

## 2017-10-02 NOTE — PROGRESS NOTES
SUBJECTIVE:   Amanda Richardson is a 52 year old female who presents to clinic today for the following health issues:    Pre/Op Follow Up  Amanda presents to clinic today for a follow up on her pre-op from 9/28/17. Upon her pre-op visit she presented with a cough and elevated WBC of 18.1 was subsequently diagnosed with pneumonia and a UTI. A plan was made to follow up with clinic before her surgery today on 10/02/17. Upon her visit today Amanda reports that her lungs are feeling better and that her cough has resolved. Her WBC level has dropped from 18.1 to 14.6 which is baseline for her. She reports that there has been no color changes or odor to her urine but she believes the urine is looking more clear. She denies any fevers.    Problem list and histories reviewed & adjusted, as indicated.  Additional history: as documented    Patient Active Problem List   Diagnosis     MS (multiple sclerosis) (H)     Non Hodgkin's lymphoma (H)     Leukocytosis     Gallstone     Hypertension goal BP (blood pressure) < 140/90     Spinal stenosis, lumbar     Lumbago     Abnormality of gait     Nonallopathic lesion of thoracic region     Nonallopathic lesion of cervical region     Pain medication agreement- signed Nov 20,2012     Ex-smoker     Lumbar radiculopathy     Colon polyps     Neuralgia, neuritis, and radiculitis, unspecified     Encounter for long-term current use of medication     Health Care Home     Moderate depressive episode (H)     Prediabetes     Leg muscle spasm     Chronic pain syndrome     Controlled substance agreement signed     Severe obesity with body mass index (BMI) of 35.0 to 35.9 and comorbidity (H)     Past Surgical History:   Procedure Laterality Date     COLONOSCOPY N/A 1/7/2015    tubular adenomas x 2 - due 5 yrs     FUSION LUMBAR ANTERIOR, FUSION LUMBAR POSTERIOR TWO LEVELS, COMBINED  10/17/2013    lumbar fusion - Dr Floyd     INSERT PUMP BACLOFEN  04/2017    intrathecal baclofen pump  implantation     IRRIGATION AND DEBRIDEMENT LOWER EXTREMITY, COMBINED Right 2015    Right ankle I&D d/t infection     OPEN REDUCTION INTERNAL FIXATION ANKLE  5/15    Right Bimalleolar ankle fx ORIF     OPEN REDUCTION INTERNAL FIXATION ANKLE Right 11/2015    Revision due to spasms pulling screws out of ankle     SINUS SURGERY  2011    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 Father      with CHF      CANCER Father      ? unsure type - abdominal      Hypertension Mother      Thyroid Disease Mother      goiter      Breast Cancer Sister 58     Thyroid Disease Son      Cancer - colorectal No family hx of          Current Outpatient Prescriptions   Medication Sig Dispense Refill     cefdinir (OMNICEF) 300 MG capsule Take 1 capsule (300 mg) by mouth 2 times daily for 10 days 20 capsule 0     oxyCODONE (ROXICODONE) 15 MG IR tablet Take 0.5-1 tablets (7.5-15 mg) by mouth every 4 hours as needed for moderate to severe pain Limit 4 tablet(s) per day. 4 tablet 0     metoprolol (TOPROL-XL) 50 MG 24 hr tablet TAKE ONE TABLET BY MOUTH ONCE DAILY 90 tablet 0     DULoxetine (CYMBALTA) 30 MG EC capsule TAKE ONE CAPSULE BY MOUTH IN THE MORNING AND TWO IN THE EVENING 270 capsule 3     gabapentin (NEURONTIN) 600 MG tablet Take 1 tablet (600 mg) by mouth 3 times daily May take an additional 300 mg per day as needed 270 tablet 1     baclofen (LIORESAL) 10 MG tablet Take 1 tablet (10 mg) by mouth 3 times daily May take an additional 10 mg as needed qd for total of 40 mg per day 120 tablet 1     losartan-hydrochlorothiazide (HYZAAR) 100-25 MG per tablet TAKE ONE TABLET  "BY MOUTH ONCE DAILY 90 tablet 0     ATIVAN 0.5 MG tablet Take one dose at bedtime. May have 2 other doses every 6 hours if needed for muscle spasms. Max of 3 tabs per day. Do not take if sleepy. 60 tablet 0     ASPIRIN PO Take 162 mg by mouth daily       amitriptyline (ELAVIL) 100 MG tablet Take 1 tablet (100 mg) by mouth At Bedtime 90 tablet 3     lidocaine (LIDODERM) 5 % patch Apply 1  patches to low back and right buttock ( cut to fit)  area at once for up to 12 h within a 24 h period.  Remove after 12 hours. 30 patch 6     Cholecalciferol (VITAMIN D PO) Take 1 tablet by mouth daily 1000 IU daily       Multiple Vitamin (MULTI-VITAMIN PO) Take 1 tablet by mouth daily        omega-3 fatty acids (FISH OIL) 1200 MG capsule Take 1 capsule by mouth daily.       Allergies   Allergen Reactions     Lisinopril      Lip swelling     Flexeril [Cyclobenzaprine Hcl]      Got confused          Reviewed and updated as needed this visit by clinical staff  Tobacco  Allergies  Meds  Problems  Med Hx  Surg Hx  Fam Hx  Soc Hx        Reviewed and updated as needed this visit by Provider  Tobacco  Allergies  Meds  Problems  Med Hx  Surg Hx  Fam Hx  Soc Hx          ROS:  Constitutional, HEENT, cardiovascular, pulmonary, GI, , musculoskeletal, neuro, skin, endocrine and psych systems are negative, except as otherwise noted.    This document serves as a record of the services and decisions personally performed and made by Yanna Dugan PA-C. It was created on her behalf by Robert Almaraz, a trained medical scribe. The creation of this document is based on the provider's statements to the medical scribe.  Robert Almaraz 7:59 AM October 2, 2017    OBJECTIVE:   /76  Pulse 86  Temp 98.3  F (36.8  C) (Tympanic)  Resp 14  Ht 5' 8.5\" (1.74 m)  Wt 250 lb (113.4 kg)  LMP 12/11/2011  SpO2 95%  Breastfeeding? No  BMI 37.46 kg/m2  Body mass index is 37.46 kg/(m^2).  GENERAL: healthy, alert and no " distress  RESP: lungs clear to auscultation - no rales, rhonchi or wheezes  CV: regular rate and rhythm, normal S1 S2, no S3 or S4, no murmur, click or rub, no peripheral edema and peripheral pulses strong  PSYCH: mentation appears normal, affect normal/bright    Diagnostic Test Results:  Results for orders placed or performed in visit on 10/02/17 (from the past 24 hour(s))   *UA reflex to Microscopic and Culture ([place] and [facility] (except [place] and [place])   Result Value Ref Range    Color Urine Yellow     Appearance Urine Clear     Glucose Urine Negative NEG^Negative mg/dL    Bilirubin Urine Negative NEG^Negative    Ketones Urine Negative NEG^Negative mg/dL    Specific Gravity Urine 1.015 1.003 - 1.035    Blood Urine Trace (A) NEG^Negative    pH Urine 6.0 5.0 - 7.0 pH    Protein Albumin Urine 30 (A) NEG^Negative mg/dL    Urobilinogen Urine 1.0 0.2 - 1.0 EU/dL    Nitrite Urine Negative NEG^Negative    Leukocyte Esterase Urine Negative NEG^Negative    Source Midstream Urine    Urine Microscopic   Result Value Ref Range    WBC Urine 2-5 (A) OTO2^O - 2 /HPF    RBC Urine O - 2 OTO2^O - 2 /HPF    Squamous Epithelial /LPF Urine Few FEW^Few /LPF    Bacteria Urine Few (A) NEG^Negative /HPF   CBC with platelets and differential   Result Value Ref Range    WBC 14.6 (H) 4.0 - 11.0 10e9/L    RBC Count 4.64 3.8 - 5.2 10e12/L    Hemoglobin 12.8 11.7 - 15.7 g/dL    Hematocrit 39.1 35.0 - 47.0 %    MCV 84 78 - 100 fl    MCH 27.6 26.5 - 33.0 pg    MCHC 32.7 31.5 - 36.5 g/dL    RDW 14.9 10.0 - 15.0 %    Platelet Count 330 150 - 450 10e9/L    Diff Method Automated Method     % Neutrophils 74.8 %    % Lymphocytes 17.2 %    % Monocytes 5.1 %    % Eosinophils 2.6 %    % Basophils 0.3 %    Absolute Neutrophil 10.9 (H) 1.6 - 8.3 10e9/L    Absolute Lymphocytes 2.5 0.8 - 5.3 10e9/L    Absolute Monocytes 0.8 0.0 - 1.3 10e9/L    Absolute Eosinophils 0.4 0.0 - 0.7 10e9/L    Absolute Basophils 0.0 0.0 - 0.2 10e9/L   Hemoglobin  A1c   Result Value Ref Range    Hemoglobin A1C 6.5 (H) 4.3 - 6.0 %       ASSESSMENT/PLAN:   Amanda was seen today for recheck.    Diagnoses and all orders for this visit:    Microalbuminuria, Acute cystitis with hematuria  Protein albumin has dropped from 100 to 30, nitrite and leukocyte esterase levels have resolved, and microscopic WBC and Bacteria have been reduced from patients visit on 9/28/17. Continue Cefdinir   -     Albumin Random Urine Quantitative with Creat Ratio  -     Urine Microscopic  -     *UA reflex to Microscopic and Culture ([place] and [facility] (except [place] and [place])    Pneumonia of right lower lobe due to infectious organism (H)  Clinically improving.  Continue cefdinir.  Lung sounds have improved with expiratory rhonchi no longer apparent. WBC level has dropped from 18.1 to 14.6 which is patients baseline. Approval was given for Baclofen pump replacement that is scheduled for today, 10/02/17.  -     CBC with platelets and differential    Prediabetes  Hemoglobin A1C level of 6.5 upon Amanda's visit today.  -     Hemoglobin A1c      The information in this document, created by the medical scribe for me, accurately reflects the services I personally performed and the decisions made by me. I have reviewed and approved this document for accuracy prior to leaving the patient care area.  October 2, 2017 7:59 AM    Yanna Dugan PA-C  [facility] [place]

## 2017-10-03 NOTE — PROGRESS NOTES
Amanda  Here are your recent results.  Your WBC's have returned to their normal levels.    Your microalbumin (protein in your urine) has returned to its baseline level - it was likely the bacteria in the urine causing it to be elevated.    I hope your surgery went well!      If you have any questions please do not hesitate to contact our office via phone ([phone]) or [facility].    Yanna Dugan, MS, PA-C  [facility]

## 2017-10-11 ENCOUNTER — TELEPHONE (OUTPATIENT)
Dept: FAMILY MEDICINE | Facility: CLINIC | Age: 52
End: 2017-10-11

## 2017-10-11 NOTE — TELEPHONE ENCOUNTER
Reason for Call:  Same Day Appointment, Requested Provider:  Julius Hassan MD    PCP: Julius Hassan    Reason for visit: f/u on pnemonia    Duration of symptoms: 2 weeks    Have you been treated for this in the past? Yes    Additional comments: Please work her in on Friday afternoon. 10/13/2017    Can we leave a detailed message on this number? YES    Phone number patient can be reached at::  [phone] Home         Best Time: any      Call taken on 10/11/2017 at 1:44 PM by Magdalena Ascencio

## 2017-10-16 DIAGNOSIS — G89.4 CHRONIC PAIN SYNDROME: ICD-10-CM

## 2017-10-16 DIAGNOSIS — G35 MS (MULTIPLE SCLEROSIS) (H): ICD-10-CM

## 2017-10-16 RX ORDER — OXYCODONE HYDROCHLORIDE 15 MG/1
7.5-15 TABLET ORAL EVERY 4 HOURS PRN
Qty: 120 TABLET | Refills: 0 | Status: SHIPPED | OUTPATIENT
Start: 2017-10-19 | End: 2017-11-14

## 2017-10-16 NOTE — TELEPHONE ENCOUNTER
Reason for Call:  Other prescription    Detailed comments: Pt called this afternoon and would like a refill on her oxycodone from Dr. Hassan for this month due to the [facility] needing a referral for her to be seen again (this is why she is asking Dr. Hassan to do just one more month). Pt is wondering if there is anyway that it could be ready by the 19th so she can start her correct dosage one time. Please refill and give pt a call if there are any questions. Thank you.    Phone Number Patient can be reached at: Home number on file [phone] (home)    Best Time:     Can we leave a detailed message on this number? YES    Call taken on 10/16/2017 at 12:57 PM by Elidia Blanchard

## 2017-10-16 NOTE — TELEPHONE ENCOUNTER
Controlled Substance Refill Request for Oxycodone  Problem List Complete:  Yes    Last Written Prescription Date:  9/20/2017  Last Fill Quantity: 4,   # refills: 0    Last Office Visit with [facility] primary care provider: 8/18/2017    Clinic visit frequency required: Q 3 months     Future Office visit:   Next 5 appointments (look out 90 days)     Oct 17, 2017  3:40 PM CDT   Office Visit with Yanna Dugan PA-C   [facility]    [address] [place] [id number]   [phone]                  Controlled substance agreement on file: Yes:  Date 6/9/2016.     Processing:  Patient will  in clinic     checked in past 6 months?  Yes 6/15/2017     Routing refill request to provider for review/approval because:  Drug not on the [facility] refill protocol       LILLIANA Deng, RN, PHN  [facility] Triage  ) [phone]

## 2017-10-17 ENCOUNTER — OFFICE VISIT (OUTPATIENT)
Dept: FAMILY MEDICINE | Facility: CLINIC | Age: 52
End: 2017-10-17
Payer: COMMERCIAL

## 2017-10-17 VITALS
DIASTOLIC BLOOD PRESSURE: 57 MMHG | OXYGEN SATURATION: 95 % | WEIGHT: 250 LBS | BODY MASS INDEX: 37.03 KG/M2 | HEIGHT: 69 IN | HEART RATE: 60 BPM | SYSTOLIC BLOOD PRESSURE: 100 MMHG | TEMPERATURE: 98.7 F

## 2017-10-17 DIAGNOSIS — I10 HYPERTENSION GOAL BP (BLOOD PRESSURE) < 140/90: ICD-10-CM

## 2017-10-17 DIAGNOSIS — J18.9 RECURRENT PNEUMONIA: Primary | ICD-10-CM

## 2017-10-17 PROCEDURE — 99214 OFFICE O/P EST MOD 30 MIN: CPT | Performed by: PHYSICIAN ASSISTANT

## 2017-10-17 RX ORDER — AZITHROMYCIN 250 MG/1
TABLET, FILM COATED ORAL
Qty: 6 TABLET | Refills: 0 | Status: SHIPPED | OUTPATIENT
Start: 2017-10-17 | End: 2017-11-01

## 2017-10-17 NOTE — NURSING NOTE
"Chief Complaint   Patient presents with     Cough     pneumonia f/u 10/2 still has cough and fatigue        Initial /57  Pulse 60  Temp 98.7  F (37.1  C) (Tympanic)  Ht 5' 8.5\" (1.74 m)  Wt 250 lb (113.4 kg)  LMP 12/11/2011  SpO2 95%  BMI 37.46 kg/m2 Estimated body mass index is 37.46 kg/(m^2) as calculated from the following:    Height as of this encounter: 5' 8.5\" (1.74 m).    Weight as of this encounter: 250 lb (113.4 kg).  Medication Reconciliation: complete   Gabriela Buckner CMA        "

## 2017-10-17 NOTE — PROGRESS NOTES
SUBJECTIVE:   Amanda Richardson is a 52 year old female who presents to clinic today for the following health issues:    Pneumonia Follow-up- She was diagnosed with pneumonia of right lower lobe due to infectious organism upon her 9/28/17 visit (this was a preop for her baclofen pump) for complaint of cough and noted UTI. The patient was seen again on 10/2/17 for pre-op clearance for Baclofen pump replacement. During her 10/2/17 visit, she reported symptomatic relief of her cough on Cefdinir and her physical exam had normalized. Her WBC level had dropped from 18.1 to 14.6 which was patient's baseline.     Today, she reports that she was denied surgery clearance. She was advised to complete antibiotic therapy and to repeat pre-op clearance at least 2 weeks after antibiotic completion. She reports that her coughing symptoms have returned and also reports associated fatigue. She states that she is currently smoking. Denies fever and reports that she is drinking adequate fluids.     Problem list and histories reviewed & adjusted, as indicated.  Additional history: as documented    Patient Active Problem List   Diagnosis     MS (multiple sclerosis) (H)     Non Hodgkin's lymphoma (H)     Leukocytosis     Gallstone     Hypertension goal BP (blood pressure) < 140/90     Spinal stenosis, lumbar     Lumbago     Abnormality of gait     Nonallopathic lesion of thoracic region     Nonallopathic lesion of cervical region     Pain medication agreement- signed Nov 20,2012     Ex-smoker     Lumbar radiculopathy     Colon polyps     Neuralgia, neuritis, and radiculitis, unspecified     Encounter for long-term current use of medication     Health Care Home     Moderate depressive episode (H)     Prediabetes     Leg muscle spasm     Chronic pain syndrome     Controlled substance agreement signed     Severe obesity with body mass index (BMI) of 35.0 to 35.9 and comorbidity (H)     Past Surgical History:   Procedure Laterality Date      COLONOSCOPY N/A 1/7/2015    tubular adenomas x 2 - due 5 yrs     FUSION LUMBAR ANTERIOR, FUSION LUMBAR POSTERIOR TWO LEVELS, COMBINED  10/17/2013    lumbar fusion - Dr Floyd     INSERT PUMP BACLOFEN  04/2017    intrathecal baclofen pump implantation     IRRIGATION AND DEBRIDEMENT LOWER EXTREMITY, COMBINED Right 2015    Right ankle I&D d/t infection     OPEN REDUCTION INTERNAL FIXATION ANKLE  5/15    Right Bimalleolar ankle fx ORIF     OPEN REDUCTION INTERNAL FIXATION ANKLE Right 11/2015    Revision due to spasms pulling screws out of ankle     SINUS SURGERY  2011    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 Father      with CHF      CANCER Father      ? unsure type - abdominal      Hypertension Mother      Thyroid Disease Mother      goiter      Breast Cancer Sister 58     Thyroid Disease Son      Cancer - colorectal No family hx of          Current Outpatient Prescriptions   Medication Sig Dispense Refill     azithromycin (ZITHROMAX) 250 MG tablet Two tablets first day, then one tablet daily for four days. 6 tablet 0     amoxicillin-clavulanate (AUGMENTIN) 875-125 MG per tablet Take 1 tablet by mouth 2 times daily for 10 days 20 tablet 0     [START ON 10/19/2017] oxyCODONE (ROXICODONE) 15 MG IR tablet Take 0.5-1 tablets (7.5-15 mg) by mouth every 4 hours as needed for moderate to severe pain Limit 4 tablet(s) per day. 120 tablet 0     losartan-hydrochlorothiazide (HYZAAR) 100-25 MG per tablet TAKE ONE TABLET BY MOUTH ONCE DAILY 90 tablet 0     metoprolol (TOPROL-XL) 50 MG 24 hr tablet TAKE ONE TABLET BY MOUTH ONCE DAILY 90 tablet 0      DULoxetine (CYMBALTA) 30 MG EC capsule TAKE ONE CAPSULE BY MOUTH IN THE MORNING AND TWO IN THE EVENING 270 capsule 3     gabapentin (NEURONTIN) 600 MG tablet Take 1 tablet (600 mg) by mouth 3 times daily May take an additional 300 mg per day as needed 270 tablet 1     baclofen (LIORESAL) 10 MG tablet Take 1 tablet (10 mg) by mouth 3 times daily May take an additional 10 mg as needed qd for total of 40 mg per day (Patient taking differently: Take 150 mg by mouth 3 times daily May take an additional 10 mg as needed qd for total of 40 mg per day) 120 tablet 1     ATIVAN 0.5 MG tablet Take one dose at bedtime. May have 2 other doses every 6 hours if needed for muscle spasms. Max of 3 tabs per day. Do not take if sleepy. 60 tablet 0     ASPIRIN PO Take 162 mg by mouth daily       amitriptyline (ELAVIL) 100 MG tablet Take 1 tablet (100 mg) by mouth At Bedtime 90 tablet 3     lidocaine (LIDODERM) 5 % patch Apply 1  patches to low back and right buttock ( cut to fit)  area at once for up to 12 h within a 24 h period.  Remove after 12 hours. 30 patch 6     Cholecalciferol (VITAMIN D PO) Take 1 tablet by mouth daily 1000 IU daily       Multiple Vitamin (MULTI-VITAMIN PO) Take 1 tablet by mouth daily        omega-3 fatty acids (FISH OIL) 1200 MG capsule Take 1 capsule by mouth daily.       Allergies   Allergen Reactions     Lisinopril      Lip swelling     Cyclobenzaprine Other (See Comments)     Per 4-10-17 H&P by Yanna Dugan PA-C.     Flexeril [Cyclobenzaprine Hcl]      Got confused          Reviewed and updated as needed this visit by clinical staff  Tobacco  Allergies  Meds  Problems  Med Hx  Surg Hx  Fam Hx  Soc Hx        Reviewed and updated as needed this visit by Provider  Tobacco  Allergies  Meds  Problems  Med Hx  Surg Hx  Fam Hx  Soc Hx          ROS:  Constitutional, HEENT, cardiovascular, pulmonary, GI, , musculoskeletal, neuro, skin, endocrine and psych systems are negative, except as otherwise  "noted.    This document serves as a record of the services and decisions personally performed and made by Yanna Dugan PA-C. It was created on her behalf by Radha Sweet, a trained medical scribe. The creation of this document is based on the provider's statements to the medical scribe.  Radha Sweet 5:00 PM October 17, 2017    OBJECTIVE:   /57  Pulse 60  Temp 98.7  F (37.1  C) (Tympanic)  Ht 5' 8.5\" (1.74 m)  Wt 250 lb (113.4 kg)  LMP 12/11/2011  SpO2 95%  BMI 37.46 kg/m2  Body mass index is 37.46 kg/(m^2).  GENERAL: healthy, alert and no distress  RESP: expiratory rhonchi throughout lung fields - no rales or wheezes  CV: regular rate and rhythm, normal S1 S2, no S3 or S4, no murmur, click or rub, no peripheral edema and peripheral pulses strong  SKIN: no suspicious lesions or rashes  PSYCH: mentation appears normal, affect normal/bright    ASSESSMENT/PLAN:   Amanda was seen today for cough.    Diagnoses and all orders for this visit:    Recurrent pneumonia   She has been started on both Zithromax and Augmentin therapy as recommended below to treat symptoms.   -     azithromycin (ZITHROMAX) 250 MG tablet; Two tablets first day, then one tablet daily for four days.  -     amoxicillin-clavulanate (AUGMENTIN) 875-125 MG per tablet; Take 1 tablet by mouth 2 times daily for 10 days    Hypertension goal BP (blood pressure) < 140/90   Patient has been advised to take half the prescribed Hyzaar dose and to continue Metoprolol as recommended for her high blood pressure. She was also advised to keep a BP diary at home.      She will follow up in approximately 10 days if noting improvement to be evaluated for preop clearance once again.  She will call if not improving.    The information in this document, created by the medical scribe for me, accurately reflects the services I personally performed and the decisions made by me. I have reviewed and approved this document for accuracy prior to leaving the patient " [facility] area.  October 17, 2017 5:17 PM    Yanna Dugan PA-C  [facility]

## 2017-10-17 NOTE — MR AVS SNAPSHOT
"              After Visit Summary   10/17/2017    Amanda Richardson    MRN: [id number]           Patient Information     Date Of Birth          1965        Visit Information        Provider Department      10/17/2017 3:40 PM Yanna Dugan PA-C [facility]        Today's Diagnoses     Recurrent pneumonia    -  1    Hypertension goal BP (blood pressure) < 140/90           Follow-ups after your visit        Who to contact     If you have questions or need follow up information about today's clinic visit or your schedule please contact [facility] directly at [phone].  Normal or non-critical lab and imaging results will be communicated to you by [employer], letter or phone within 4 business days after the clinic has received the results. If you do not hear from us within 7 days, please contact the clinic through [employer] or phone. If you have a critical or abnormal lab result, we will notify you by phone as soon as possible.  Submit refill requests through [employer] or call your pharmacy and they will forward the refill request to us. Please allow 3 business days for your refill to be completed.          Additional Information About Your Visit        MyChart Information     [employer] gives you secure access to your electronic health record. If you see a primary care provider, you can also send messages to your care team and make appointments. If you have questions, please call your primary care clinic.  If you do not have a primary care provider, please call [phone] and they will assist you.        Care EveryWhere ID     This is your Care EveryWhere ID. This could be used by other organizations to access your [facility] medical records  [id number]        Your Vitals Were     Pulse Temperature Height Last Period Pulse Oximetry BMI (Body Mass Index)    60 98.7  F (37.1  C) (Tympanic) 5' 8.5\" (1.74 m) 12/11/2011 95% 37.46 kg/m2       Blood Pressure from Last 3 Encounters: "   10/17/17 100/57   10/02/17 132/76   09/28/17 131/71    Weight from Last 3 Encounters:   10/17/17 250 lb (113.4 kg)   10/02/17 250 lb (113.4 kg)   09/28/17 250 lb (113.4 kg)              Today, you had the following     No orders found for display         Today's Medication Changes          These changes are accurate as of: 10/17/17 11:59 PM.  If you have any questions, ask your nurse or doctor.               Start taking these medicines.        Dose/Directions    amoxicillin-clavulanate 875-125 MG per tablet   Commonly known as:  AUGMENTIN   Used for:  Recurrent pneumonia   Started by:  aYnna Dugan PA-C        Dose:  1 tablet   Take 1 tablet by mouth 2 times daily for 10 days   Quantity:  20 tablet   Refills:  0       azithromycin 250 MG tablet   Commonly known as:  ZITHROMAX   Used for:  Recurrent pneumonia   Started by:  Yanna Dugan PA-C        Two tablets first day, then one tablet daily for four days.   Quantity:  6 tablet   Refills:  0         These medicines have changed or have updated prescriptions.        Dose/Directions    baclofen 10 MG tablet   Commonly known as:  LIORESAL   This may have changed:    - how much to take  - additional instructions   Used for:  Chronic pain of both lower extremities        Dose:  10 mg   Take 1 tablet (10 mg) by mouth 3 times daily May take an additional 10 mg as needed qd for total of 40 mg per day   Quantity:  120 tablet   Refills:  1            Where to get your medicines      These medications were sent to [facility] [place] - [address]  [address] [facility] [place] [address]     Phone:  [phone]     amoxicillin-clavulanate 875-125 MG per tablet    azithromycin 250 MG tablet                Primary Care Provider Office Phone # Fax #    Julius Hassan [phone]       [address] [place] [id number]        Goals        General    I will continue to work with home care until discharge  goals are met. (pt-stated)     Notes - Note edited  10/28/2015  3:09 PM by Cordelia Moreland RN    As of today's date 9/22/2015 goal is met at 76 - 100%.   Goal Status:  Ongoing  As of today's date 10/28/2015 goal is met at 76 - 100%.   Goal Status:  Complete            Equal Access to Services     [facility]: Hadii aad ku hadasho Soomaali, waaxda luqadaha, qaybta kaalmada adeegyada, waxay winniein hayaan adeeg marychuy laOscaraan . So [facility] [phone].    ATENCIÓN: Si habla español, tiene a woods disposición servicios gratuitos de asistencia lingüística. Llame al [phone].    We comply with applicable federal civil rights laws and [place] laws. We do not discriminate on the basis of race, color, national origin, age, disability, sex, sexual orientation, or gender identity.            Thank you!     Thank you for choosing [facility]  for your care. Our goal is always to provide you with excellent care. Hearing back from our patients is one way we can continue to improve our services. Please take a few minutes to complete the written survey that you may receive in the mail after your visit with us. Thank you!             Your Updated Medication List - Protect others around you: Learn how to safely use, store and throw away your medicines at www.disposemymeds.org.          This list is accurate as of: 10/17/17 11:59 PM.  Always use your most recent med list.                   Brand Name Dispense Instructions for use Diagnosis    amitriptyline 100 MG tablet    ELAVIL    90 tablet    Take 1 tablet (100 mg) by mouth At Bedtime    Lumbar radiculopathy, Chronic pain syndrome, MS (multiple sclerosis) (H), Moderate depressive episode (H)       amoxicillin-clavulanate 875-125 MG per tablet    AUGMENTIN    20 tablet    Take 1 tablet by mouth 2 times daily for 10 days    Recurrent pneumonia       ASPIRIN PO      Take 162 mg by mouth daily        ATIVAN 0.5 MG tablet   Generic drug:  LORazepam     60 tablet     Take one dose at bedtime. May have 2 other doses every 6 hours if needed for muscle spasms. Max of 3 tabs per day. Do not take if sleepy.    Muscle spasms of both lower extremities       azithromycin 250 MG tablet    ZITHROMAX    6 tablet    Two tablets first day, then one tablet daily for four days.    Recurrent pneumonia       baclofen 10 MG tablet    LIORESAL    120 tablet    Take 1 tablet (10 mg) by mouth 3 times daily May take an additional 10 mg as needed qd for total of 40 mg per day    Chronic pain of both lower extremities       DULoxetine 30 MG EC capsule    CYMBALTA    270 capsule    TAKE ONE CAPSULE BY MOUTH IN THE MORNING AND TWO IN THE EVENING    Lumbar radiculopathy, MS (multiple sclerosis) (H), Chronic pain syndrome, Moderate depressive episode (H)       gabapentin 600 MG tablet    NEURONTIN    270 tablet    Take 1 tablet (600 mg) by mouth 3 times daily May take an additional 300 mg per day as needed    Chronic pain of both lower extremities       lidocaine 5 % Patch    LIDODERM    30 patch    Apply 1  patches to low back and right buttock ( cut to fit)  area at once for up to 12 h within a 24 h period.  Remove after 12 hours.    Lumbar radiculopathy, Muscle spasms of both lower extremities       losartan-hydrochlorothiazide 100-25 MG per tablet    HYZAAR    90 tablet    TAKE ONE TABLET BY MOUTH ONCE DAILY    Hypertension goal BP (blood pressure) < 140/90       metoprolol 50 MG 24 hr tablet    TOPROL-XL    90 tablet    TAKE ONE TABLET BY MOUTH ONCE DAILY    Hypertension goal BP (blood pressure) < 140/90       MULTI-VITAMIN PO      Take 1 tablet by mouth daily        omega-3 fatty acids 1200 MG capsule      Take 1 capsule by mouth daily.        oxyCODONE 15 MG IR tablet   Start taking on:  10/19/2017    ROXICODONE    120 tablet    Take 0.5-1 tablets (7.5-15 mg) by mouth every 4 hours as needed for moderate to severe pain Limit 4 tablet(s) per day.    MS (multiple sclerosis) (H), Chronic pain syndrome        VITAMIN D PO      Take 1 tablet by mouth daily 1000 IU daily

## 2017-11-01 ENCOUNTER — OFFICE VISIT (OUTPATIENT)
Dept: FAMILY MEDICINE | Facility: CLINIC | Age: 52
End: 2017-11-01
Payer: COMMERCIAL

## 2017-11-01 ENCOUNTER — RADIANT APPOINTMENT (OUTPATIENT)
Dept: GENERAL RADIOLOGY | Facility: CLINIC | Age: 52
End: 2017-11-01
Attending: PHYSICIAN ASSISTANT
Payer: COMMERCIAL

## 2017-11-01 VITALS
WEIGHT: 250 LBS | DIASTOLIC BLOOD PRESSURE: 62 MMHG | HEART RATE: 77 BPM | OXYGEN SATURATION: 97 % | TEMPERATURE: 97.7 F | BODY MASS INDEX: 37.03 KG/M2 | SYSTOLIC BLOOD PRESSURE: 110 MMHG | HEIGHT: 69 IN

## 2017-11-01 DIAGNOSIS — J18.9 PNEUMONIA DUE TO INFECTIOUS ORGANISM, UNSPECIFIED LATERALITY, UNSPECIFIED PART OF LUNG: ICD-10-CM

## 2017-11-01 DIAGNOSIS — J18.9 PNEUMONIA DUE TO INFECTIOUS ORGANISM, UNSPECIFIED LATERALITY, UNSPECIFIED PART OF LUNG: Primary | ICD-10-CM

## 2017-11-01 DIAGNOSIS — I51.7 CARDIOMEGALY: ICD-10-CM

## 2017-11-01 DIAGNOSIS — J98.01 ACUTE BRONCHOSPASM: ICD-10-CM

## 2017-11-01 DIAGNOSIS — Z72.0 TOBACCO ABUSE: ICD-10-CM

## 2017-11-01 LAB
BASOPHILS # BLD AUTO: 0 10E9/L (ref 0–0.2)
BASOPHILS NFR BLD AUTO: 0.3 %
DIFFERENTIAL METHOD BLD: ABNORMAL
EOSINOPHIL # BLD AUTO: 0.4 10E9/L (ref 0–0.7)
EOSINOPHIL NFR BLD AUTO: 3 %
ERYTHROCYTE [DISTWIDTH] IN BLOOD BY AUTOMATED COUNT: 14.8 % (ref 10–15)
ERYTHROCYTE [SEDIMENTATION RATE] IN BLOOD BY WESTERGREN METHOD: 39 MM/H (ref 0–30)
HCT VFR BLD AUTO: 40.7 % (ref 35–47)
HGB BLD-MCNC: 13.2 G/DL (ref 11.7–15.7)
LYMPHOCYTES # BLD AUTO: 2.3 10E9/L (ref 0.8–5.3)
LYMPHOCYTES NFR BLD AUTO: 16.6 %
MCH RBC QN AUTO: 27.4 PG (ref 26.5–33)
MCHC RBC AUTO-ENTMCNC: 32.4 G/DL (ref 31.5–36.5)
MCV RBC AUTO: 84 FL (ref 78–100)
MONOCYTES # BLD AUTO: 0.7 10E9/L (ref 0–1.3)
MONOCYTES NFR BLD AUTO: 5 %
NEUTROPHILS # BLD AUTO: 10.5 10E9/L (ref 1.6–8.3)
NEUTROPHILS NFR BLD AUTO: 75.1 %
PLATELET # BLD AUTO: 351 10E9/L (ref 150–450)
RBC # BLD AUTO: 4.82 10E12/L (ref 3.8–5.2)
WBC # BLD AUTO: 14 10E9/L (ref 4–11)

## 2017-11-01 PROCEDURE — 94640 AIRWAY INHALATION TREATMENT: CPT | Performed by: PHYSICIAN ASSISTANT

## 2017-11-01 PROCEDURE — 99215 OFFICE O/P EST HI 40 MIN: CPT | Mod: 25 | Performed by: PHYSICIAN ASSISTANT

## 2017-11-01 PROCEDURE — 36415 COLL VENOUS BLD VENIPUNCTURE: CPT | Performed by: PHYSICIAN ASSISTANT

## 2017-11-01 PROCEDURE — 85025 COMPLETE CBC W/AUTO DIFF WBC: CPT | Performed by: PHYSICIAN ASSISTANT

## 2017-11-01 PROCEDURE — 71010 XR CHEST 1 VW: CPT

## 2017-11-01 PROCEDURE — 85652 RBC SED RATE AUTOMATED: CPT | Performed by: PHYSICIAN ASSISTANT

## 2017-11-01 RX ORDER — IPRATROPIUM BROMIDE AND ALBUTEROL SULFATE 2.5; .5 MG/3ML; MG/3ML
1 SOLUTION RESPIRATORY (INHALATION) EVERY 6 HOURS PRN
Qty: 30 VIAL | Refills: 1 | Status: SHIPPED | OUTPATIENT
Start: 2017-11-01 | End: 2018-06-13

## 2017-11-01 RX ORDER — ALBUTEROL SULFATE 0.83 MG/ML
1 SOLUTION RESPIRATORY (INHALATION) ONCE
Qty: 1 VIAL | Refills: 0 | COMMUNITY
Start: 2017-11-01 | End: 2018-06-13

## 2017-11-01 RX ORDER — LEVOFLOXACIN 500 MG/1
500 TABLET, FILM COATED ORAL DAILY
Qty: 10 TABLET | Refills: 0 | Status: SHIPPED | OUTPATIENT
Start: 2017-11-01 | End: 2017-11-01

## 2017-11-01 NOTE — PROGRESS NOTES
SUBJECTIVE:   Amanda Richardson is a 52 year old female who presents to clinic today for the following health issues:    Pneumonia F/U  Amanda presents to clinic for a follow up of her persistent pneumonia. She was initially diagnosed with pneumonia on a pre-op visit with myself on 09/28/17. She presented with a cough and elevated white count of 18.1 and a chest xray indicated right infiltrate of the right lobe. She was treated with cefdinir and her white count dropped to 14.6 on 10/02/17, no subsequent imaging was performed on this visit. Upon this visit she reported improved breathing and reported the cough had resolved. She was most recently seen by myself on 10/17/17 reporting continued pneumonia symptoms. Upon this visit she was started on azithromycin and amoxicillin-clavulanate and no additional blood work or imaging was performed. She was denied surgical clearance for her baclofen pump and was informed to reschedule a pre-op for at least two weeks after completing antibiotic regimen and being asymptomatic. Upon today's visit she reports that she is still having a cough that is productive off and on. She also notes increased fatigue. Of note she smokes approximately 10 cigarettes daily.    Problem list and histories reviewed & adjusted, as indicated.  Additional history: as documented    Patient Active Problem List   Diagnosis     MS (multiple sclerosis) (H)     Non Hodgkin's lymphoma (H)     Leukocytosis     Gallstone     Hypertension goal BP (blood pressure) < 140/90     Spinal stenosis, lumbar     Lumbago     Abnormality of gait     Nonallopathic lesion of thoracic region     Nonallopathic lesion of cervical region     Pain medication agreement- signed Nov 20,2012     Lumbar radiculopathy     Colon polyps     Neuralgia, neuritis, and radiculitis, unspecified     Encounter for long-term current use of medication     Health Care Home     Moderate depressive episode (H)     Prediabetes     Leg muscle  spasm     Chronic pain syndrome     Controlled substance agreement signed     Severe obesity with body mass index (BMI) of 35.0 to 35.9 and comorbidity (H)     T-cell lymphoma (H)     Tobacco abuse     Past Surgical History:   Procedure Laterality Date     COLONOSCOPY N/A 1/7/2015    tubular adenomas x 2 - due 5 yrs     FUSION LUMBAR ANTERIOR, FUSION LUMBAR POSTERIOR TWO LEVELS, COMBINED  10/17/2013    lumbar fusion - Dr Floyd     INSERT PUMP BACLOFEN  04/2017    intrathecal baclofen pump implantation     IRRIGATION AND DEBRIDEMENT LOWER EXTREMITY, COMBINED Right 2015    Right ankle I&D d/t infection     OPEN REDUCTION INTERNAL FIXATION ANKLE  5/15    Right Bimalleolar ankle fx ORIF     OPEN REDUCTION INTERNAL FIXATION ANKLE Right 11/2015    Revision due to spasms pulling screws out of ankle     SINUS SURGERY  2011    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 Father      with CHF      CANCER Father      ? unsure type - abdominal      Hypertension Mother      Thyroid Disease Mother      goiter      Breast Cancer Sister 58     Thyroid Disease Son      Cancer - colorectal No family hx of          Current Outpatient Prescriptions   Medication Sig Dispense Refill     albuterol (2.5 MG/3ML) 0.083% neb solution Take 1 vial (2.5 mg) by nebulization once for 1 dose 1 vial 0     ipratropium - albuterol 0.5 mg/2.5 mg/3 mL (DUONEB) 0.5-2.5 (3) MG/3ML neb solution Take 1 vial (3 mLs) by nebulization every 6 hours as needed for shortness of breath / dyspnea or wheezing 30 vial 1     order for DME Neb machine 1 each 0     oxyCODONE (ROXICODONE) 15 MG IR  tablet Take 0.5-1 tablets (7.5-15 mg) by mouth every 4 hours as needed for moderate to severe pain Limit 4 tablet(s) per day. 120 tablet 0     losartan-hydrochlorothiazide (HYZAAR) 100-25 MG per tablet TAKE ONE TABLET BY MOUTH ONCE DAILY 90 tablet 0     metoprolol (TOPROL-XL) 50 MG 24 hr tablet TAKE ONE TABLET BY MOUTH ONCE DAILY 90 tablet 0     DULoxetine (CYMBALTA) 30 MG EC capsule TAKE ONE CAPSULE BY MOUTH IN THE MORNING AND TWO IN THE EVENING 270 capsule 3     gabapentin (NEURONTIN) 600 MG tablet Take 1 tablet (600 mg) by mouth 3 times daily May take an additional 300 mg per day as needed 270 tablet 1     baclofen (LIORESAL) 10 MG tablet Take 1 tablet (10 mg) by mouth 3 times daily May take an additional 10 mg as needed qd for total of 40 mg per day (Patient taking differently: Take 150 mg by mouth 3 times daily May take an additional 10 mg as needed qd for total of 40 mg per day) 120 tablet 1     ATIVAN 0.5 MG tablet Take one dose at bedtime. May have 2 other doses every 6 hours if needed for muscle spasms. Max of 3 tabs per day. Do not take if sleepy. 60 tablet 0     ASPIRIN PO Take 162 mg by mouth daily       amitriptyline (ELAVIL) 100 MG tablet Take 1 tablet (100 mg) by mouth At Bedtime 90 tablet 3     lidocaine (LIDODERM) 5 % patch Apply 1  patches to low back and right buttock ( cut to fit)  area at once for up to 12 h within a 24 h period.  Remove after 12 hours. 30 patch 6     Cholecalciferol (VITAMIN D PO) Take 1 tablet by mouth daily 1000 IU daily       Multiple Vitamin (MULTI-VITAMIN PO) Take 1 tablet by mouth daily        omega-3 fatty acids (FISH OIL) 1200 MG capsule Take 1 capsule by mouth daily.       Allergies   Allergen Reactions     Lisinopril      Lip swelling     Cyclobenzaprine Other (See Comments)     Per 4-10-17 H&P by Yanna Dugan PA-C.     Flexeril [Cyclobenzaprine Hcl]      Got confused          Reviewed and updated as needed this visit by clinical staff  Tobacco  Allergies  Meds   "Problems  Med Hx  Surg Hx  Fam Hx  Soc Hx        Reviewed and updated as needed this visit by Provider  Tobacco  Allergies  Meds  Problems  Med Hx  Surg Hx  Fam Hx  Soc Hx          ROS:  Constitutional, HEENT, cardiovascular, pulmonary, GI, , musculoskeletal, neuro, skin, endocrine and psych systems are negative, except as otherwise noted.    This document serves as a record of the services and decisions personally performed and made by Yanna Dugan PA-C. It was created on her behalf by Robert Almaraz, a trained medical scribe. The creation of this document is based on the provider's statements to the medical scribe.  Robert Almaraz 4:27 PM November 1, 2017    OBJECTIVE:   /62  Pulse 77  Temp 97.7  F (36.5  C) (Tympanic)  Ht 5' 8.5\" (1.74 m)  Wt 250 lb (113.4 kg)  LMP 12/11/2011  SpO2 97%  BMI 37.46 kg/m2  Body mass index is 37.46 kg/(m^2).  GENERAL: healthy, alert and no distress  RESP: diffuse respiratory wheezes, otherwise lungs clear to auscultation - no rales, rhonchi or wheezes  CV: regular rate and rhythm, normal S1 S2, no S3 or S4, no murmur, click or rub, no peripheral edema and peripheral pulses strong  PSYCH: mentation appears normal, affect normal/bright    Lungs sounds improved upon duoneb administration. Wheezes and crackles cleared    Diagnostic Test Results:  Results for orders placed or performed in visit on 11/01/17   CBC with platelets and differential   Result Value Ref Range    WBC 14.0 (H) 4.0 - 11.0 10e9/L    RBC Count 4.82 3.8 - 5.2 10e12/L    Hemoglobin 13.2 11.7 - 15.7 g/dL    Hematocrit 40.7 35.0 - 47.0 %    MCV 84 78 - 100 fl    MCH 27.4 26.5 - 33.0 pg    MCHC 32.4 31.5 - 36.5 g/dL    RDW 14.8 10.0 - 15.0 %    Platelet Count 351 150 - 450 10e9/L    Diff Method Automated Method     % Neutrophils 75.1 %    % Lymphocytes 16.6 %    % Monocytes 5.0 %    % Eosinophils 3.0 %    % Basophils 0.3 %    Absolute Neutrophil 10.5 (H) 1.6 - 8.3 10e9/L    Absolute " Lymphocytes 2.3 0.8 - 5.3 10e9/L    Absolute Monocytes 0.7 0.0 - 1.3 10e9/L    Absolute Eosinophils 0.4 0.0 - 0.7 10e9/L    Absolute Basophils 0.0 0.0 - 0.2 10e9/L   ESR: Erythrocyte sedimentation rate   Result Value Ref Range    Sed Rate 39 (H) 0 - 30 mm/h         Recent Results (from the past 744 hour(s))   XR Chest 1 View    Narrative    CHEST ONE VIEW 11/1/2017 5:02 PM     HISTORY: Not available    COMPARISON: 9/28/2017      Impression    IMPRESSION: The cardiac silhouette is enlarged and has increased since  the previous examination. The pulmonary vasculature is not distended.  Mild scarring or atelectasis in the lung bases. There is no airspace  consolidation.    WYATT WEEKS MD       ASSESSMENT/PLAN:   Amanda was seen today for cough.    Diagnoses and all orders for this visit:    Pneumonia due to infectious organism, unspecified laterality, unspecified part of lung, Cardiomegaly, Acute bronchospasm  Start ipratropium-albuterol to treat persistent pneumonia. An albuterol neb was administered upon visit today which markedly improved lung sounds. Order placed for home nebulizer machine. Chest x-ray results indicate possible cardiomegaly. Ordered an echocardiogram to rule out cardiac congestion which could be influencing lung volume and cough. Patient last had echocardiogram in 2012 with unremarkable results. Patient will schedule procedure due to cardiomegaly on exam today. Follow up if symptoms persist or worsen.    Will refer to pulmonology as well as I suspect underlying COPD.  -     CBC with platelets and differential  -     ESR: Erythrocyte sedimentation rate  -     Echocardiogram Complete; Future  -     INHALATION/NEBULIZER TREATMENT, INITIAL  -     ipratropium - albuterol 0.5 mg/2.5 mg/3 mL (DUONEB) 0.5-2.5 (3) MG/3ML neb solution; Take 1 vial (3 mLs) by nebulization every 6 hours as needed for shortness of breath / dyspnea or wheezing  -     order for DME; Neb machine  - Pulmonary medicine  referral    Tobacco abuse  Patient reports smoking approximately one pack per day. Tobacco cessation once again encouraged.      The information in this document, created by the medical scribe for me, accurately reflects the services I personally performed and the decisions made by me. I have reviewed and approved this document for accuracy prior to leaving the patient care area.  November 1, 2017 4:27 PM    Yanna Dugan PA-C  [facility]

## 2017-11-01 NOTE — NURSING NOTE
"Chief Complaint   Patient presents with     Cough     pneumonia f/u        Initial /62  Pulse 77  Temp 97.7  F (36.5  C) (Tympanic)  Ht 5' 8.5\" (1.74 m)  Wt 250 lb (113.4 kg)  LMP 12/11/2011  SpO2 97%  BMI 37.46 kg/m2 Estimated body mass index is 37.46 kg/(m^2) as calculated from the following:    Height as of this encounter: 5' 8.5\" (1.74 m).    Weight as of this encounter: 250 lb (113.4 kg).  Medication Reconciliation: complete   Gabriela Buckner, CARLOS      "

## 2017-11-01 NOTE — MR AVS SNAPSHOT
After Visit Summary   11/1/2017    Amanda Richardson    MRN: [id number]           Patient Information     Date Of Birth          1965        Visit Information        Provider Department      11/1/2017 3:40 PM Yanna Dugan PA-C [facility]        Today's Diagnoses     Pneumonia due to infectious organism, unspecified laterality, unspecified part of lung    -  1    Cardiomegaly        Acute bronchospasm        Tobacco abuse           Follow-ups after your visit        Future tests that were ordered for you today     Open Future Orders        Priority Expected Expires Ordered    Echocardiogram Complete Routine  11/1/2018 11/1/2017            Who to contact     If you have questions or need follow up information about today's clinic visit or your schedule please contact [facility] directly at [phone].  Normal or non-critical lab and imaging results will be communicated to you by MyChart, letter or phone within 4 business days after the clinic has received the results. If you do not hear from us within 7 days, please contact the clinic through MyChart or phone. If you have a critical or abnormal lab result, we will notify you by phone as soon as possible.  Submit refill requests through [employer] or call your pharmacy and they will forward the refill request to us. Please allow 3 business days for your refill to be completed.          Additional Information About Your Visit        MyChart Information     [employer] gives you secure access to your electronic health record. If you see a primary care provider, you can also send messages to your care team and make appointments. If you have questions, please call your primary care clinic.  If you do not have a primary care provider, please call [phone] and they will assist you.        Care EveryWhere ID     This is your Care EveryWhere ID. This could be used by other organizations to access your [facility]  "records  [id number]        Your Vitals Were     Pulse Temperature Height Last Period Pulse Oximetry BMI (Body Mass Index)    77 97.7  F (36.5  C) (Tympanic) 5' 8.5\" (1.74 m) 12/11/2011 97% 37.46 kg/m2       Blood Pressure from Last 3 Encounters:   11/01/17 110/62   10/17/17 100/57   10/02/17 132/76    Weight from Last 3 Encounters:   11/01/17 250 lb (113.4 kg)   10/17/17 250 lb (113.4 kg)   10/02/17 250 lb (113.4 kg)              We Performed the Following     CBC with platelets and differential     ESR: Erythrocyte sedimentation rate     INHALATION/NEBULIZER TREATMENT, INITIAL          Today's Medication Changes          These changes are accurate as of: 11/1/17  5:10 PM.  If you have any questions, ask your nurse or doctor.               Start taking these medicines.        Dose/Directions    ipratropium - albuterol 0.5 mg/2.5 mg/3 mL 0.5-2.5 (3) MG/3ML neb solution   Commonly known as:  DUONEB   Used for:  Acute bronchospasm   Started by:  Yanna Dugan PA-C        Dose:  1 vial   Take 1 vial (3 mLs) by nebulization every 6 hours as needed for shortness of breath / dyspnea or wheezing   Quantity:  30 vial   Refills:  1       order for DME   Used for:  Acute bronchospasm   Started by:  Yanna Dugan PA-C        Neb machine   Quantity:  1 each   Refills:  0         These medicines have changed or have updated prescriptions.        Dose/Directions    baclofen 10 MG tablet   Commonly known as:  LIORESAL   This may have changed:    - how much to take  - additional instructions   Used for:  Chronic pain of both lower extremities        Dose:  10 mg   Take 1 tablet (10 mg) by mouth 3 times daily May take an additional 10 mg as needed qd for total of 40 mg per day   Quantity:  120 tablet   Refills:  1            Where to get your medicines      These medications were [facility] [address]  [address] [address]     Phone:  [phone] "     ipratropium - albuterol 0.5 mg/2.5 mg/3 mL 0.5-2.5 (3) MG/3ML neb solution         Some of these will need a paper prescription and others can be bought over the counter.  Ask your nurse if you have questions.     Bring a paper prescription for each of these medications     order for DME                Primary Care Provider Office Phone # Fax #    Julius Hassan [phone] [address] [facility] [address]        Goals        General    I will continue to work with home care until discharge goals are met. (pt-stated)     Notes - Note edited  10/28/2015  3:09 PM by Cordelia Moreland RN    As of today's date 9/22/2015 goal is met at 76 - 100%.   Goal Status:  Ongoing  As of today's date 10/28/2015 goal is met at 76 - 100%.   Goal Status:  Complete            Equal Access to Services     [facility]: Hadii aad ku hadasho Sokelly, waaxda luqadaha, qaybta kaalmada adeegyada, marco chavira . So [facility] [phone].    ATENCIÓN: Si habla español, tiene a woods disposición servicios gratuitos de asistencia lingüística. Llame al [phone].    We comply with applicable federal civil rights laws and [place] laws. We do not discriminate on the basis of race, color, national origin, age, disability, sex, sexual orientation, or gender identity.            Thank you!     Thank you for choosing [facility]  for your care. Our goal is always to provide you with excellent care. Hearing back from our patients is one way we can continue to improve our services. Please take a few minutes to complete the written survey that you may receive in the mail after your visit with us. Thank you!             Your Updated Medication List - Protect others around you: Learn how to safely use, store and throw away your medicines at www.disposemymeds.org.          This list is accurate as of: 11/1/17  5:10 PM.  Always use your most recent med list.                   Brand  Name Dispense Instructions for use Diagnosis    albuterol (2.5 MG/3ML) 0.083% neb solution     1 vial    Take 1 vial (2.5 mg) by nebulization once for 1 dose    Pneumonia due to infectious organism, unspecified laterality, unspecified part of lung       amitriptyline 100 MG tablet    ELAVIL    90 tablet    Take 1 tablet (100 mg) by mouth At Bedtime    Lumbar radiculopathy, Chronic pain syndrome, MS (multiple sclerosis) (H), Moderate depressive episode (H)       ASPIRIN PO      Take 162 mg by mouth daily        ATIVAN 0.5 MG tablet   Generic drug:  LORazepam     60 tablet    Take one dose at bedtime. May have 2 other doses every 6 hours if needed for muscle spasms. Max of 3 tabs per day. Do not take if sleepy.    Muscle spasms of both lower extremities       baclofen 10 MG tablet    LIORESAL    120 tablet    Take 1 tablet (10 mg) by mouth 3 times daily May take an additional 10 mg as needed qd for total of 40 mg per day    Chronic pain of both lower extremities       DULoxetine 30 MG EC capsule    CYMBALTA    270 capsule    TAKE ONE CAPSULE BY MOUTH IN THE MORNING AND TWO IN THE EVENING    Lumbar radiculopathy, MS (multiple sclerosis) (H), Chronic pain syndrome, Moderate depressive episode (H)       gabapentin 600 MG tablet    NEURONTIN    270 tablet    Take 1 tablet (600 mg) by mouth 3 times daily May take an additional 300 mg per day as needed    Chronic pain of both lower extremities       ipratropium - albuterol 0.5 mg/2.5 mg/3 mL 0.5-2.5 (3) MG/3ML neb solution    DUONEB    30 vial    Take 1 vial (3 mLs) by nebulization every 6 hours as needed for shortness of breath / dyspnea or wheezing    Acute bronchospasm       lidocaine 5 % Patch    LIDODERM    30 patch    Apply 1  patches to low back and right buttock ( cut to fit)  area at once for up to 12 h within a 24 h period.  Remove after 12 hours.    Lumbar radiculopathy, Muscle spasms of both lower extremities       losartan-hydrochlorothiazide 100-25 MG per  tablet    HYZAAR    90 tablet    TAKE ONE TABLET BY MOUTH ONCE DAILY    Hypertension goal BP (blood pressure) < 140/90       metoprolol 50 MG 24 hr tablet    TOPROL-XL    90 tablet    TAKE ONE TABLET BY MOUTH ONCE DAILY    Hypertension goal BP (blood pressure) < 140/90       MULTI-VITAMIN PO      Take 1 tablet by mouth daily        omega-3 fatty acids 1200 MG capsule      Take 1 capsule by mouth daily.        order for DME     1 each    Neb machine    Acute bronchospasm       oxyCODONE 15 MG IR tablet    ROXICODONE    120 tablet    Take 0.5-1 tablets (7.5-15 mg) by mouth every 4 hours as needed for moderate to severe pain Limit 4 tablet(s) per day.    MS (multiple sclerosis) (H), Chronic pain syndrome       VITAMIN D PO      Take 1 tablet by mouth daily 1000 IU daily

## 2017-11-03 ENCOUNTER — TELEPHONE (OUTPATIENT)
Dept: FAMILY MEDICINE | Facility: CLINIC | Age: 52
End: 2017-11-03

## 2017-11-03 NOTE — TELEPHONE ENCOUNTER
Attempt # 1 to [phone].    Left non-detailed VM for patient to call back.    LILLIANA Deng, RN, PHN  [facility]

## 2017-11-03 NOTE — TELEPHONE ENCOUNTER
Please advise pt that I placed a referral for pulmonology as I am concerned about underlying COPD.  They book out quite a ways so please have her make appt in the near future for evaluation.      Yanna Dugan MS, PA-C

## 2017-11-06 NOTE — TELEPHONE ENCOUNTER
Attempt #2.    Kirsten Garner contacted Amanda on 11/06/17 and left a message. If patient calls back please contact RN team.  Cheryl Garner RN  [facility]

## 2017-11-07 NOTE — TELEPHONE ENCOUNTER
Patient returning call    Advised of information below - Patient stated an understanding and agreed with plan.    Ivanna Kelly RN  [facility]

## 2017-11-12 ENCOUNTER — HEALTH MAINTENANCE LETTER (OUTPATIENT)
Age: 52
End: 2017-11-12

## 2017-11-14 ENCOUNTER — MYC REFILL (OUTPATIENT)
Dept: FAMILY MEDICINE | Facility: CLINIC | Age: 52
End: 2017-11-14

## 2017-11-14 ENCOUNTER — MYC MEDICAL ADVICE (OUTPATIENT)
Dept: FAMILY MEDICINE | Facility: CLINIC | Age: 52
End: 2017-11-14

## 2017-11-14 DIAGNOSIS — G35 MS (MULTIPLE SCLEROSIS) (H): ICD-10-CM

## 2017-11-14 DIAGNOSIS — G89.4 CHRONIC PAIN SYNDROME: ICD-10-CM

## 2017-11-14 NOTE — TELEPHONE ENCOUNTER
To: RV TRIAGE      From: Amanda Richardson      Created: 11/14/2017 3:40 PM        *-*-*This message has not been handled.*-*-*      Dr. Hassan,    I have requested a refill of the the oxycodone as my prescription will run out on the 17th. Could you please have the prescription available for Fernando to  by the 16th? This will give [facility] time to have the medication ready and prevent any interruption in my scheduled dose. We have been having trouble with them not having the medication in stock.  If somebody could call me and let me know when it is ready at the , I would appreciate it.    Thank you,   Amanda Richardson

## 2017-11-14 NOTE — TELEPHONE ENCOUNTER
Controlled Substance Refill Request for Oxycodone  Problem List Complete:  Yes    Last Written Prescription Date:  10/19/2017  Last Fill Quantity: 120,   # refills: 0    Last Office Visit with [facility] primary care provider: 11/1/2017    Clinic visit frequency required: Q 3 months     Future Office visit:     Controlled substance agreement on file: Yes:  Date 6/17/2016.     Processing:  Patient will  in clinic     checked in past 6 months?  Yes 6/15/2017       Routing refill request to provider for review/approval because:  Drug not on the [facility] refill protocol       Chandrika Nathan, LILLIANA, RN, PHN  [facility] [phone]

## 2017-11-14 NOTE — TELEPHONE ENCOUNTER
Noted.  See refill encounter.    Chandrika Nathan, BS, RN, N  [facility]) [phone]

## 2017-11-15 RX ORDER — OXYCODONE HYDROCHLORIDE 15 MG/1
7.5-15 TABLET ORAL EVERY 4 HOURS PRN
Qty: 120 TABLET | Refills: 0 | Status: SHIPPED | OUTPATIENT
Start: 2017-11-17 | End: 2017-12-14

## 2017-11-20 ENCOUNTER — HOSPITAL ENCOUNTER (OUTPATIENT)
Dept: CARDIOLOGY | Facility: CLINIC | Age: 52
Discharge: HOME | End: 2017-11-20
Attending: PHYSICIAN ASSISTANT | Admitting: PHYSICIAN ASSISTANT
Payer: MEDICARE

## 2017-11-20 DIAGNOSIS — I51.7 CARDIOMEGALY: ICD-10-CM

## 2017-11-20 PROCEDURE — 93306 TTE W/DOPPLER COMPLETE: CPT | Mod: 26 | Performed by: INTERNAL MEDICINE

## 2017-11-20 PROCEDURE — 40000264 ECHO COMPLETE WITH OPTISON

## 2017-11-20 PROCEDURE — 25500064 ZZH RX 255 OP 636: Performed by: PHYSICIAN ASSISTANT

## 2017-11-20 RX ADMIN — HUMAN ALBUMIN MICROSPHERES AND PERFLUTREN 5 ML: 10; .22 INJECTION, SOLUTION INTRAVENOUS at 13:30

## 2017-11-20 NOTE — PROGRESS NOTES
Triage: please inform patient of follow up recommendations:    Amanda  Here are your recent results.  Great news!  Your echocardiogram shows no changes from the last study in 2012.  The enlargement of the heart on Xray was likely due to angle.  No further follow up is needed of this.  If you have any questions please do not hesitate to contact our office via phone ([phone]) or MyChart.    Yanna Dugan MS, PA-C  [facility]

## 2017-12-14 ENCOUNTER — MYC MEDICAL ADVICE (OUTPATIENT)
Dept: FAMILY MEDICINE | Facility: CLINIC | Age: 52
End: 2017-12-14

## 2017-12-14 ENCOUNTER — MYC REFILL (OUTPATIENT)
Dept: FAMILY MEDICINE | Facility: CLINIC | Age: 52
End: 2017-12-14

## 2017-12-14 DIAGNOSIS — G89.4 CHRONIC PAIN SYNDROME: ICD-10-CM

## 2017-12-14 DIAGNOSIS — G35 MS (MULTIPLE SCLEROSIS) (H): ICD-10-CM

## 2017-12-14 RX ORDER — OXYCODONE HYDROCHLORIDE 15 MG/1
7.5-15 TABLET ORAL EVERY 4 HOURS PRN
Qty: 120 TABLET | Refills: 0 | Status: SHIPPED | OUTPATIENT
Start: 2017-12-15 | End: 2018-01-10

## 2017-12-14 NOTE — TELEPHONE ENCOUNTER
Message from [employer]:  Original authorizing provider: MD Amanda Viera would like a refill of the following medications:  oxyCODONE IR (ROXICODONE) 15 MG tablet [Julius Hassan MD]    Preferred pharmacy: WRITTEN PRESCRIPTION REQUESTED    Comment:

## 2017-12-14 NOTE — TELEPHONE ENCOUNTER
Controlled Substance Refill Request for   oxyCODONE IR (ROXICODONE) 15 MG tablet 120 tablet 0 11/17/2017  No   Sig: Take 0.5-1 tablets (7.5-15 mg) by mouth every 4 hours as needed for moderate to severe pain Limit 4 tablet(s) per day.   Class: Local Print   Route: Oral   Order: [id number]       Problem List Complete:  Yes      Last Office Visit with [facility] primary care provider: 11/1/2017    Clinic visit frequency required: Q 6  months     Future Office visit:     Controlled substance agreement on file: Yes:  Date unsure.     Processing:  Patient will  in clinic     checked in past 6 months?  No, route to RN     Cordelia Stevens RN, BSN  [facility]

## 2017-12-14 NOTE — TELEPHONE ENCOUNTER
Patient wants to  today or tomorrow.  See other mychart note.    LILLIANA Deng, RN, PHN  [facility]) [phone]

## 2017-12-14 NOTE — TELEPHONE ENCOUNTER
rx done, due 12/17, ok to fill on 12/15, not due until 1/16/18 for next refill, advise follow up visits every 3 months

## 2017-12-18 DIAGNOSIS — I10 HYPERTENSION GOAL BP (BLOOD PRESSURE) < 140/90: ICD-10-CM

## 2017-12-21 ENCOUNTER — TELEPHONE (OUTPATIENT)
Dept: FAMILY MEDICINE | Facility: CLINIC | Age: 52
End: 2017-12-21

## 2017-12-21 RX ORDER — METOPROLOL SUCCINATE 50 MG/1
TABLET, EXTENDED RELEASE ORAL
Qty: 90 TABLET | Refills: 0 | Status: SHIPPED | OUTPATIENT
Start: 2017-12-21 | End: 2018-03-23

## 2017-12-21 NOTE — TELEPHONE ENCOUNTER
Reason for Call:  Other prescription    Detailed comments: The patient says she called the pharmacy and they said to call the clinic as we are still waiting for her metoprolol 50 mg medication to be signed. She says she is taking her last pill today and needs it.     Phone Number Patient can be reached at: Home number on file [phone] (home)    Best Time: Anytime    Can we leave a detailed message on this number? NO    Call taken on 12/21/2017 at 2:59 PM by Tiffany Wood

## 2017-12-21 NOTE — TELEPHONE ENCOUNTER
Requested Prescriptions   Pending Prescriptions Disp Refills     metoprolol (TOPROL-XL) 50 MG 24 hr tablet [Pharmacy Med Name: METOPROLOL ER 50MG  TAB] 90 tablet 0     Sig: TAKE ONE TABLET BY MOUTH ONCE DAILY    Beta-Blockers Protocol Failed    12/18/2017  2:57 PM       Failed - Blood pressure under 140/90    BP Readings from Last 3 Encounters:   11/01/17 110/62   10/17/17 100/57   10/02/17 132/76                Passed - Patient is age 6 or older       Passed - Recent or future visit with authorizing provider's specialty    Patient had office visit in the last year or has a visit in the next 30 days with authorizing provider.  See chart review.               BP noted is ok and within range.  Prescription approved per [facility] Refill Protocol.  Cheryl Garner RN  [facility]

## 2018-01-02 ENCOUNTER — TELEPHONE (OUTPATIENT)
Dept: LAB | Facility: CLINIC | Age: 53
End: 2018-01-02

## 2018-01-02 NOTE — TELEPHONE ENCOUNTER
Labs on 2017 have  and a letter was sent on . Patient has not followed up. Routing to team to address.    [facility]

## 2018-01-08 DIAGNOSIS — I10 HYPERTENSION GOAL BP (BLOOD PRESSURE) < 140/90: ICD-10-CM

## 2018-01-08 NOTE — TELEPHONE ENCOUNTER
Routing refill request to provider for review/approval because:  Drug interaction warning  Jolene Forbes,RN  [facility]  [phone]            Requested Prescriptions   Pending Prescriptions Disp Refills     losartan-hydrochlorothiazide (HYZAAR) 100-25 MG per tablet [Pharmacy Med Name: LOSARTAN/HCTZ 100-25MG TAB] 90 tablet 0     Sig: TAKE ONE TABLET BY MOUTH ONCE DAILY    Angiotensin-II Receptors Passed    1/8/2018 11:38 AM       Passed - Blood pressure under 140/90 in past 12 months.    BP Readings from Last 3 Encounters:   11/01/17 110/62   10/17/17 100/57   10/02/17 132/76                Passed - Recent or future visit with authorizing provider's specialty    Patient had office visit in the last year or has a visit in the next 30 days with authorizing provider.  See chart review.              Passed - Patient is age 18 or older       Passed - No active pregnancy on record       Passed - Normal serum creatinine on file in past 12 months    Recent Labs   Lab Test  09/28/17   1354   CR  0.59            Passed - Normal serum potassium on file in past 12 months    Recent Labs   Lab Test  09/28/17   1354   POTASSIUM  3.7                   Passed - No positive pregnancy test in past 12 months

## 2018-01-09 RX ORDER — LOSARTAN POTASSIUM AND HYDROCHLOROTHIAZIDE 25; 100 MG/1; MG/1
TABLET ORAL
Qty: 90 TABLET | Refills: 3 | Status: SHIPPED | OUTPATIENT
Start: 2018-01-09 | End: 2019-01-22

## 2018-01-09 NOTE — TELEPHONE ENCOUNTER
rx done, recent visit, advise due for med check visit in next 1-2 months.    Creatinine   Date Value Ref Range Status   09/28/2017 0.59 0.52 - 1.04 mg/dL Final     BP Readings from Last 3 Encounters:   11/01/17 110/62   10/17/17 100/57   10/02/17 132/76

## 2018-01-10 ENCOUNTER — MYC MEDICAL ADVICE (OUTPATIENT)
Dept: FAMILY MEDICINE | Facility: CLINIC | Age: 53
End: 2018-01-10

## 2018-01-10 ENCOUNTER — MYC REFILL (OUTPATIENT)
Dept: FAMILY MEDICINE | Facility: CLINIC | Age: 53
End: 2018-01-10

## 2018-01-10 DIAGNOSIS — G35 MS (MULTIPLE SCLEROSIS) (H): ICD-10-CM

## 2018-01-10 DIAGNOSIS — G89.4 CHRONIC PAIN SYNDROME: ICD-10-CM

## 2018-01-10 NOTE — TELEPHONE ENCOUNTER
Message from [employer]:  Original authorizing provider: MD Aamnda Viera would like a refill of the following medications:  oxyCODONE IR (ROXICODONE) 15 MG tablet [Julius Hassan MD]    Preferred pharmacy: WRITTEN PRESCRIPTION REQUESTED    Comment:

## 2018-01-10 NOTE — TELEPHONE ENCOUNTER
Pt would like her   this prescription when ready     Cordelia Stevens RN, BSN  [facility]

## 2018-01-11 RX ORDER — OXYCODONE HYDROCHLORIDE 15 MG/1
7.5-15 TABLET ORAL EVERY 4 HOURS PRN
Qty: 120 TABLET | Refills: 0 | Status: SHIPPED | OUTPATIENT
Start: 2018-01-12 | End: 2018-02-08

## 2018-01-11 NOTE — TELEPHONE ENCOUNTER
Medication Detail         Disp Refills Start End AISHA     oxyCODONE IR (ROXICODONE) 15 MG tablet 120 tablet 0 12/15/2017  No     Sig: Take 0.5-1 tablets (7.5-15 mg) by mouth every 4 hours as needed for moderate to severe pain Limit 4 tablet(s) per day.     Problem List Complete:  Yes    Last Office Visit with [facility] primary care provider: 11/01/2017    Clinic visit frequency required: None Noted     Future Office visit:     Controlled substance agreement on file: Yes:  Date ?.     Processing:  Patient will  in clinic    Routing refill request to provider for review/approval because:  Drug not on the [facility] refill protocol       Ivanna Kelly RN  [facility]

## 2018-02-08 ENCOUNTER — MYC REFILL (OUTPATIENT)
Dept: FAMILY MEDICINE | Facility: CLINIC | Age: 53
End: 2018-02-08

## 2018-02-08 ENCOUNTER — MYC MEDICAL ADVICE (OUTPATIENT)
Dept: FAMILY MEDICINE | Facility: CLINIC | Age: 53
End: 2018-02-08

## 2018-02-08 DIAGNOSIS — G89.4 CHRONIC PAIN SYNDROME: ICD-10-CM

## 2018-02-08 DIAGNOSIS — G35 MS (MULTIPLE SCLEROSIS) (H): ICD-10-CM

## 2018-02-08 RX ORDER — OXYCODONE HYDROCHLORIDE 15 MG/1
7.5-15 TABLET ORAL EVERY 4 HOURS PRN
Qty: 120 TABLET | Refills: 0 | Status: SHIPPED | OUTPATIENT
Start: 2018-02-10 | End: 2018-03-07

## 2018-02-08 NOTE — TELEPHONE ENCOUNTER
CSA:   Problem List Complete:  Yes  Clinic visit frequency required: None Noted   Controlled substance agreement on file: Yes:  Date 06/09/2016.     02/08/2018 [facility] Message:   I have requested a refill of my oxycodone prescription. Could Fernando stop by the clinic either today or tomorrow so I can get it filled over the weekend? Please let me know when it is available for .     Routing refill request to provider for review/approval because:  Drug not on the [facility] refill protocol       Ivanna Kelly RN  [facility]

## 2018-02-08 NOTE — TELEPHONE ENCOUNTER
oxyCODONE IR (ROXICODONE) 15 MG tablet      Last Written Prescription Date:  1/12/2018  Last Fill Quantity: 120,   # refills: 0  Last Office Visit: 11/1/2017  Future Office visit:    Next 5 appointments (look out 90 days)     Feb 12, 2018  4:00 PM CST   [facility]   [facility]    [address] [place] [id number] [phone]                   Routing refill request to provider for review/approval because:  Drug not on the [facility], [facility] or [facility] refill protocol or controlled substance

## 2018-02-08 NOTE — TELEPHONE ENCOUNTER
rx done, refill due 2/12, ok to refill on 2/10, next refill after 3/12/18    Due for med check visit in next 1 months.

## 2018-02-08 NOTE — TELEPHONE ENCOUNTER
Message from [employer]:  Original authorizing provider: MD Amanda Viera would like a refill of the following medications:  oxyCODONE IR (ROXICODONE) 15 MG tablet [Julius Hassan MD]    Preferred pharmacy: WRITTEN PRESCRIPTION REQUESTED    Comment:

## 2018-02-12 ENCOUNTER — OFFICE VISIT (OUTPATIENT)
Dept: FAMILY MEDICINE | Facility: CLINIC | Age: 53
End: 2018-02-12
Payer: COMMERCIAL

## 2018-02-12 VITALS
DIASTOLIC BLOOD PRESSURE: 72 MMHG | OXYGEN SATURATION: 90 % | TEMPERATURE: 98.3 F | SYSTOLIC BLOOD PRESSURE: 134 MMHG | BODY MASS INDEX: 38.51 KG/M2 | HEIGHT: 69 IN | HEART RATE: 89 BPM | WEIGHT: 260 LBS

## 2018-02-12 DIAGNOSIS — R82.90 NONSPECIFIC FINDING ON EXAMINATION OF URINE: ICD-10-CM

## 2018-02-12 DIAGNOSIS — I10 HYPERTENSION GOAL BP (BLOOD PRESSURE) < 140/90: ICD-10-CM

## 2018-02-12 DIAGNOSIS — M54.16 LUMBAR RADICULOPATHY: ICD-10-CM

## 2018-02-12 DIAGNOSIS — G35 MS (MULTIPLE SCLEROSIS) (H): Primary | ICD-10-CM

## 2018-02-12 DIAGNOSIS — E66.01 MORBID OBESITY (H): ICD-10-CM

## 2018-02-12 DIAGNOSIS — Z79.899 CONTROLLED SUBSTANCE AGREEMENT SIGNED: ICD-10-CM

## 2018-02-12 DIAGNOSIS — C85.81 OTHER SPECIFIED TYPE OF NON-HODGKIN LYMPHOMA OF HEAD (H): ICD-10-CM

## 2018-02-12 DIAGNOSIS — F32.A MODERATE DEPRESSIVE EPISODE: ICD-10-CM

## 2018-02-12 DIAGNOSIS — Z51.81 MEDICATION MONITORING ENCOUNTER: ICD-10-CM

## 2018-02-12 DIAGNOSIS — G89.4 CHRONIC PAIN SYNDROME: ICD-10-CM

## 2018-02-12 DIAGNOSIS — R73.03 PREDIABETES: ICD-10-CM

## 2018-02-12 DIAGNOSIS — Z72.0 TOBACCO ABUSE: ICD-10-CM

## 2018-02-12 LAB
ALBUMIN UR-MCNC: 100 MG/DL
APPEARANCE UR: ABNORMAL
BACTERIA #/AREA URNS HPF: ABNORMAL /HPF
BILIRUB UR QL STRIP: NEGATIVE
COLOR UR AUTO: YELLOW
GLUCOSE UR STRIP-MCNC: NEGATIVE MG/DL
HGB UR QL STRIP: ABNORMAL
KETONES UR STRIP-MCNC: NEGATIVE MG/DL
LEUKOCYTE ESTERASE UR QL STRIP: ABNORMAL
NITRATE UR QL: POSITIVE
NON-SQ EPI CELLS #/AREA URNS LPF: ABNORMAL /LPF
PH UR STRIP: 5.5 PH (ref 5–7)
RBC #/AREA URNS AUTO: ABNORMAL /HPF
SOURCE: ABNORMAL
SP GR UR STRIP: 1.02 (ref 1–1.03)
UROBILINOGEN UR STRIP-ACNC: 0.2 EU/DL (ref 0.2–1)
WBC #/AREA URNS AUTO: >100 /HPF

## 2018-02-12 PROCEDURE — 81001 URINALYSIS AUTO W/SCOPE: CPT | Performed by: FAMILY MEDICINE

## 2018-02-12 PROCEDURE — 87086 URINE CULTURE/COLONY COUNT: CPT | Performed by: FAMILY MEDICINE

## 2018-02-12 PROCEDURE — 99214 OFFICE O/P EST MOD 30 MIN: CPT | Performed by: FAMILY MEDICINE

## 2018-02-12 PROCEDURE — 99000 SPECIMEN HANDLING OFFICE-LAB: CPT | Performed by: FAMILY MEDICINE

## 2018-02-12 PROCEDURE — 87088 URINE BACTERIA CULTURE: CPT | Performed by: FAMILY MEDICINE

## 2018-02-12 PROCEDURE — 82043 UR ALBUMIN QUANTITATIVE: CPT | Performed by: FAMILY MEDICINE

## 2018-02-12 PROCEDURE — 87186 SC STD MICRODIL/AGAR DIL: CPT | Performed by: FAMILY MEDICINE

## 2018-02-12 PROCEDURE — 80307 DRUG TEST PRSMV CHEM ANLYZR: CPT | Mod: 90 | Performed by: FAMILY MEDICINE

## 2018-02-12 RX ORDER — AMITRIPTYLINE HYDROCHLORIDE 100 MG/1
100 TABLET ORAL AT BEDTIME
Qty: 90 TABLET | Refills: 3 | Status: SHIPPED | OUTPATIENT
Start: 2018-02-12 | End: 2019-10-14

## 2018-02-12 RX ORDER — DULOXETIN HYDROCHLORIDE 30 MG/1
60 CAPSULE, DELAYED RELEASE ORAL 2 TIMES DAILY
Qty: 360 CAPSULE | Refills: 3 | Status: ON HOLD | OUTPATIENT
Start: 2018-02-12 | End: 2019-01-31

## 2018-02-12 NOTE — MR AVS SNAPSHOT
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)        Prediabetes        Hypertension goal BP (blood pressure) < 140/90        Morbid obesity (H)        Tobacco abuse        Controlled substance agreement signed        Medication monitoring encounter          Care Instructions    Try taking Elavil and Gabapentin 30 minutes earlier to help with sleep    Shift Oxycodone to 3AM, 9AM, 3PM, 9PM - as discussed    Increase Cymbalta to 60 mg, twice daily    Controlled substance agreement reviewed and signed today    [employer] or [phone] are useful resources for quitting tobacco, follow up with our clinic if you desire additional assistance     [employer] for compression stockings -- medium pressure    Follow up for fasting labs when able -- call ahead to make an appointment       [facility]                        To reach your care team during and after hours:   [phone]  To reach our pharmacy:        [phone]    Clinic Hours                        Our clinic hours are:    Monday   7:30 am to 7:00 pm                  Tuesday through Friday 7:30 am to 5:00 pm                             Saturday   8:00 am to 12:00 pm      Sunday   Closed      Pharmacy Hours                        Our pharmacy hours are:    Monday   8:30 am to 7:00 pm       Tuesday to Friday  8:30 am to 6:00 pm                       Saturday    9:00 am to 1:00 pm              Sunday    Closed              There is also information available at our web site:   [email or web address]    If your provider ordered any lab tests and you do not receive the results within 10 business days, please call the clinic.    If you need a medication refill please contact your pharmacy.  Please allow 2-3 business days for your refill to be completed.    Our clinic offers telephone visits and e visits.  Please ask one of your team members to explain more.      Use [employer] (secure email communication and access to your chart) to send your primary care provider a message or make an appointment. Ask someone on your Team how to sign up for [employer].  Immunizations                      Immunization History   Administered Date(s) Administered     Influenza (H1N1) 12/10/2009     Influenza (IIV3) PF 12/10/2009, 11/06/2012, 12/08/2014     Influenza Vaccine IM 3yrs+ 4 Valent IIV4 10/21/2013     Influenza Vaccine, 3 YRS +, IM (QUADRIVALENT W/PRESERVATIVES) 09/28/2017     Pneumococcal 23 valent 01/05/2011     Tdap (Adacel,Boostrix) 09/17/2008        Health Maintenance                         Health Maintenance Due   Topic Date Due     Colon Cancer Screening - FIT Test - yearly  05/08/1973     Hepatitis C Screening  05/08/1981     Cholesterol Lab - yearly  05/02/2017     Wellness Visit with your Primary Provider - yearly  05/02/2017     URINE DRUG SCREEN Q1 YR  06/09/2017     Osteoporosis Screening (Dexa) - every 5 years   06/26/2017     Mammogram - yearly  08/16/2017               Follow-ups after your visit        Follow-up notes from your care team     Return in about 3 months (around 5/12/2018).      Who to contact     If you have questions or need follow up information about today's clinic visit or your schedule please contact [facility] directly at [phone].  Normal or non-critical lab and imaging results will be communicated to you by MyChart, letter or phone within 4 business days after the clinic has received the results. If you do not hear from us within 7 days, please contact  "the clinic through [employer] or phone. If you have a critical or abnormal lab result, we will notify you by phone as soon as possible.  Submit refill requests through [employer] or call your pharmacy and they will forward the refill request to us. Please allow 3 business days for your refill to be completed.          Additional Information About Your Visit        [employer] Information     [employer] gives you secure access to your electronic health record. If you see a primary care provider, you can also send messages to your care team and make appointments. If you have questions, please call your primary care clinic.  If you do not have a primary care provider, please call [phone] and they will assist you.        Care EveryWhere ID     This is your Care EveryWhere ID. This could be used by other organizations to access your [facility] medical records  [id number]        Your Vitals Were     Pulse Temperature Height Last Period Pulse Oximetry BMI (Body Mass Index)    89 98.3  F (36.8  C) (Oral) 5' 8.5\" (1.74 m) 12/11/2011 90% 38.96 kg/m2       Blood Pressure from Last 3 Encounters:   02/12/18 134/72   11/01/17 110/62   10/17/17 100/57    Weight from Last 3 Encounters:   02/12/18 260 lb (117.9 kg)   11/01/17 250 lb (113.4 kg)   10/17/17 250 lb (113.4 kg)              We Performed the Following     *UA reflex to Microscopic and Culture ([place] and [facility] (except [place] and [place])     Albumin Random Urine Quantitative with Creat Ratio          Today's Medication Changes          These changes are accurate as of 2/12/18  4:50 PM.  If you have any questions, ask your nurse or doctor.               These medicines have changed or have updated prescriptions.        Dose/Directions    baclofen 10 MG tablet   Commonly known as:  LIORESAL   This may have changed:    - how much to take  - additional instructions   Used for:  Chronic pain of both lower extremities        Dose:  10 mg   Take 1 tablet (10 mg) by mouth 3 " times daily May take an additional 10 mg as needed qd for total of 40 mg per day   Quantity:  120 tablet   Refills:  1       DULoxetine 30 MG EC capsule   Commonly known as:  CYMBALTA   This may have changed:  See the new instructions.   Used for:  Lumbar radiculopathy, MS (multiple sclerosis) (H), Chronic pain syndrome, Moderate depressive episode (H)   Changed by:  Julius Hassan MD        Dose:  60 mg   Take 2 capsules (60 mg) by mouth 2 times daily   Quantity:  360 capsule   Refills:  3         Stop taking these medicines if you haven't already. Please contact your care team if you have questions.     lidocaine 5 % Patch   Commonly known as:  LIDODERM   Stopped by:  Julius Hassan MD           order for DME   Stopped by:  Julius Hassan MD                Where to get your medicines      These medications were sent to [facility] [id number]  [place], [place] - [address] AT [place] [address] [place] [address] [place] [address]     Phone:  [phone]     amitriptyline 100 MG tablet    DULoxetine 30 MG EC capsule                Primary Care Provider Office Phone # Fax #    Julius Hassan [phone] [address] [facility] [address]        Goals        General    I will continue to work with home care until discharge goals are met. (pt-stated)     Notes - Note edited  10/28/2015  3:09 PM by Cordelia Moreland RN    As of today's date 9/22/2015 goal is met at 76 - 100%.   Goal Status:  Ongoing  As of today's date 10/28/2015 goal is met at 76 - 100%.   Goal Status:  Complete            Equal Access to Services     [facility]: Hadii martha swain hadasho Soomaali, waaxda luqadaha, qaybta kaalmada stepan, marco valdez. So [facility] [phone].    ATENCIÓN: Si habla español, tiene a woods disposición servicios gratuitos de asistencia lingüística. Llame al [phone].    We comply with applicable federal civil rights laws and [place] laws. We do not  discriminate on the basis of race, color, national origin, age, disability, sex, sexual orientation, or gender identity.            Thank you!     Thank you for choosing [facility]  for your care. Our goal is always to provide you with excellent care. Hearing back from our patients is one way we can continue to improve our services. Please take a few minutes to complete the written survey that you may receive in the mail after your visit with us. Thank you!             Your Updated Medication List - Protect others around you: Learn how to safely use, store and throw away your medicines at www.disposemymeds.org.          This list is accurate as of 2/12/18  4:50 PM.  Always use your most recent med list.                   Brand Name Dispense Instructions for use Diagnosis    albuterol (2.5 MG/3ML) 0.083% neb solution     1 vial    Take 1 vial (2.5 mg) by nebulization once for 1 dose    Pneumonia due to infectious organism, unspecified laterality, unspecified part of lung       amitriptyline 100 MG tablet    ELAVIL    90 tablet    Take 1 tablet (100 mg) by mouth At Bedtime    Lumbar radiculopathy, Chronic pain syndrome, MS (multiple sclerosis) (H), Moderate depressive episode (H)       ASPIRIN PO      Take 162 mg by mouth daily        ATIVAN 0.5 MG tablet   Generic drug:  LORazepam     60 tablet    Take one dose at bedtime. May have 2 other doses every 6 hours if needed for muscle spasms. Max of 3 tabs per day. Do not take if sleepy.    Muscle spasms of both lower extremities       baclofen 10 MG tablet    LIORESAL    120 tablet    Take 1 tablet (10 mg) by mouth 3 times daily May take an additional 10 mg as needed qd for total of 40 mg per day    Chronic pain of both lower extremities       DULoxetine 30 MG EC capsule    CYMBALTA    360 capsule    Take 2 capsules (60 mg) by mouth 2 times daily    Lumbar radiculopathy, MS (multiple sclerosis) (H), Chronic pain syndrome, Moderate depressive episode (H)        gabapentin 600 MG tablet    NEURONTIN    270 tablet    Take 1 tablet (600 mg) by mouth 3 times daily May take an additional 300 mg per day as needed    Chronic pain of both lower extremities       ipratropium - albuterol 0.5 mg/2.5 mg/3 mL 0.5-2.5 (3) MG/3ML neb solution    DUONEB    30 vial    Take 1 vial (3 mLs) by nebulization every 6 hours as needed for shortness of breath / dyspnea or wheezing    Acute bronchospasm       losartan-hydrochlorothiazide 100-25 MG per tablet    HYZAAR    90 tablet    TAKE ONE TABLET BY MOUTH ONCE DAILY    Hypertension goal BP (blood pressure) < 140/90       metoprolol succinate 50 MG 24 hr tablet    TOPROL-XL    90 tablet    TAKE ONE TABLET BY MOUTH ONCE DAILY    Hypertension goal BP (blood pressure) < 140/90       MULTI-VITAMIN PO      Take 1 tablet by mouth daily        omega-3 fatty acids 1200 MG capsule      Take 1 capsule by mouth daily.        oxyCODONE IR 15 MG tablet    ROXICODONE    120 tablet    Take 0.5-1 tablets (7.5-15 mg) by mouth every 4 hours as needed for moderate to severe pain Limit 4 tablet(s) per day.    MS (multiple sclerosis) (H), Chronic pain syndrome       VITAMIN D PO      Take 1 tablet by mouth daily 1000 IU daily

## 2018-02-12 NOTE — PROGRESS NOTES
SUBJECTIVE:   Amanda Richardson is a 52 year old female who presents to clinic today for the following health issues:    Hypertension Follow-up    Outpatient blood pressures are not being checked.    Low Salt Diet: no added salt    Losartan-HCTZ 100-25 mg daily, Metoprolol 50 mg daily    BP Readings from Last 3 Encounters:   02/12/18 134/72   11/01/17 110/62   10/17/17 100/57     Multiple Sclerosis -- Baclofen 150 mg three times daily (w/ another 10 mg prn once daily), Oxycodone 15 mg 4 times daily, Gabapentin 600 mg three times daily, follows with neurology    Hodgkin's Lymphoma -- Not being seen by Hematology/Onocology, as > 5 yrs out.     Tobacco Abuse -- Smoking 1/2 PPD. 30 year hx. Advise cessation.    Chronic pain - followed in past by [facility] - seen here every 3 months and there yearly, stable use    Prediabetes    Glucose   Date Value Ref Range Status   09/28/2017 150 (H) 70 - 99 mg/dL Final     Lab Results   Component Value Date    A1C 6.5 10/02/2017    A1C 6.6 05/02/2016     Anxiety/Depression/Insomnia -- Cymbalta 90 mg daily, Elavil 100 mg daily, Ativan 0.5 prn (up to 3 times daily), Vitamin D 1000 IUs daily. Amanda reported issues with sleep - both falling asleep and staying asleep.     PHQ-9: 13  REJI-7: 0    Problem list and histories reviewed & adjusted, as indicated.  Additional history: as documented    Reviewed and updated as needed this visit by clinical staff  Tobacco  Allergies  Meds  Med Hx  Surg Hx  Fam Hx  Soc Hx      Reviewed and updated as needed this visit by Provider       Wt Readings from Last 4 Encounters:   02/12/18 260 lb (117.9 kg)   11/01/17 250 lb (113.4 kg)   10/17/17 250 lb (113.4 kg)   10/02/17 250 lb (113.4 kg)       Health Maintenance    Health Maintenance Due   Topic Date Due     FIT Q1 YR  05/08/1973     DEPRESSION ACTION PLAN Q1 YR  05/08/1981     HEPATITIS C SCREENING  05/08/1981     LIPID MONITORING Q1 YEAR  05/02/2017     WELLNESS VISIT Q1 YR  05/02/2017      URINE DRUG SCREEN Q1 YR  06/09/2017     DEXA Q5 YR  06/26/2017     MAMMO Q1 YR  08/16/2017     PHQ-9 Q6 MONTHS  02/18/2018       Current Problem List    Patient Active Problem List   Diagnosis     MS (multiple sclerosis) (H)     Non Hodgkin's lymphoma (H)     Leukocytosis     Gallstone     Hypertension goal BP (blood pressure) < 140/90     Spinal stenosis, lumbar     Lumbago     Abnormality of gait     Nonallopathic lesion of thoracic region     Nonallopathic lesion of cervical region     Pain medication agreement- signed Nov 20,2012     Lumbar radiculopathy     Colon polyps     Neuralgia, neuritis, and radiculitis, unspecified     Encounter for long-term current use of medication     Health Care Home     Moderate depressive episode (H)     Prediabetes     Leg muscle spasm     Chronic pain syndrome     Controlled substance agreement signed     Severe obesity with body mass index (BMI) of 35.0 to 35.9 and comorbidity (H)     T-cell lymphoma (H)     Tobacco abuse       Past Medical History    Past Medical History:   Diagnosis Date     Abnormality of gait 7/27/2012     CARDIOVASCULAR SCREENING; LDL GOAL LESS THAN 100      Chronic pain     [facility] - yearly, next in summer 2018     Colon polyps 1/15    tubular adenomas x 2     Gallstone 6/11/2012     Hypertension goal BP (blood pressure) < 140/90      Leukocytosis 6/11/2012     Moderate depressive episode (H)      MS (multiple sclerosis) (H) 2003    Dr Vigil - [facility]     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     Tobacco abuse 06/11/2012    former       Past Surgical History    Past Surgical History:   Procedure Laterality Date     COLONOSCOPY N/A 1/7/2015    tubular adenomas x 2 - due 5 yrs     FUSION LUMBAR ANTERIOR, FUSION LUMBAR POSTERIOR TWO LEVELS, COMBINED  10/17/2013    lumbar fusion - Dr Floyd     INSERT PUMP BACLOFEN  04/2017    intrathecal baclofen pump implantation     IRRIGATION AND DEBRIDEMENT LOWER EXTREMITY, COMBINED Right 2015    Right ankle I&D d/t infection     OPEN REDUCTION INTERNAL FIXATION ANKLE  5/15    Right Bimalleolar ankle fx ORIF     OPEN REDUCTION INTERNAL FIXATION ANKLE Right 11/2015    Revision due to spasms pulling screws out of ankle     SINUS SURGERY  2011    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     XR LUMBAR EPIDURAL INJECTION INCL IMAGING  3/14    Left L4-5 Epidural Dr Winter       Current Medications    Current Outpatient Prescriptions   Medication Sig Dispense Refill     DULoxetine (CYMBALTA) 30 MG EC capsule Take 2 capsules (60 mg) by mouth 2 times daily 360 capsule 3     amitriptyline (ELAVIL) 100 MG tablet Take 1 tablet (100 mg) by mouth At Bedtime 90 tablet 3     oxyCODONE IR (ROXICODONE) 15 MG tablet Take 0.5-1 tablets (7.5-15 mg) by mouth every 4 hours as needed for moderate to severe pain Limit 4 tablet(s) per day. 120 tablet 0     losartan-hydrochlorothiazide (HYZAAR) 100-25 MG per tablet TAKE ONE TABLET BY MOUTH ONCE DAILY 90 tablet 3     metoprolol (TOPROL-XL) 50 MG 24 hr tablet TAKE ONE TABLET BY MOUTH ONCE DAILY 90 tablet 0     albuterol (2.5 MG/3ML) 0.083% neb solution Take 1 vial (2.5 mg) by nebulization once for 1 dose 1 vial 0     ipratropium - albuterol 0.5 mg/2.5 mg/3 mL (DUONEB) 0.5-2.5 (3) MG/3ML neb solution Take 1 vial (3 mLs) by nebulization every 6 hours as needed for shortness of breath / dyspnea or wheezing 30  vial 1     gabapentin (NEURONTIN) 600 MG tablet Take 1 tablet (600 mg) by mouth 3 times daily May take an additional 300 mg per day as needed 270 tablet 1     baclofen (LIORESAL) 10 MG tablet Take 1 tablet (10 mg) by mouth 3 times daily May take an additional 10 mg as needed qd for total of 40 mg per day (Patient taking differently: Take 150 mg by mouth 3 times daily May take an additional 10 mg as needed qd for total of 40 mg per day) 120 tablet 1     ATIVAN 0.5 MG tablet Take one dose at bedtime. May have 2 other doses every 6 hours if needed for muscle spasms. Max of 3 tabs per day. Do not take if sleepy. 60 tablet 0     ASPIRIN PO Take 162 mg by mouth daily       Cholecalciferol (VITAMIN D PO) Take 1 tablet by mouth daily 1000 IU daily       Multiple Vitamin (MULTI-VITAMIN PO) Take 1 tablet by mouth daily        omega-3 fatty acids (FISH OIL) 1200 MG capsule Take 1 capsule by mouth daily.       [DISCONTINUED] DULoxetine (CYMBALTA) 30 MG EC capsule TAKE ONE CAPSULE BY MOUTH IN THE MORNING AND TWO IN THE EVENING 270 capsule 3     [DISCONTINUED] amitriptyline (ELAVIL) 100 MG tablet Take 1 tablet (100 mg) by mouth At Bedtime 90 tablet 3       Allergies    Allergies   Allergen Reactions     Lisinopril      Lip swelling     Cyclobenzaprine Other (See Comments)     Per 4-10-17 H&P by Yanna Dugan PA-C.     Flexeril [Cyclobenzaprine Hcl]      Got confused        Immunizations    Immunization History   Administered Date(s) Administered     Influenza (H1N1) 12/10/2009     Influenza (IIV3) PF 12/10/2009, 11/06/2012, 12/08/2014     Influenza Vaccine IM 3yrs+ 4 Valent IIV4 10/21/2013     Influenza Vaccine, 3 YRS +, IM (QUADRIVALENT W/PRESERVATIVES) 09/28/2017     Pneumococcal 23 valent 01/05/2011     Tdap (Adacel,Boostrix) 09/17/2008       Family History    Family History   Problem Relation Age of Onset     C.A.D. Father      with CHF      CANCER Father      ? unsure type - abdominal      Hypertension Mother      Thyroid  "Disease Mother      goiter      Breast Cancer Sister 58     Thyroid Disease Son      Cancer - colorectal No family hx of      f  Social History    Social History     Social History     Marital status:      Spouse name: Fernando     Number of children: 3     Years of education: 14     Occupational History      Unemployed     Social History Main Topics     Smoking status: Current Every Day Smoker     Packs/day: 0.50     Types: Cigarettes     Last attempt to quit: 8/4/2013     Smokeless tobacco: Never Used      Comment: since age 17     Alcohol use No     Drug use: No     Sexual activity: Yes     Partners: Male     Other Topics Concern     Parent/Sibling W/ Cabg, Mi Or Angioplasty Before 65f 55m? No     Caffeine Concern Yes     occas     Exercise Yes     as able     Seat Belt Yes     Social History Narrative       All above reviewed and updated, all stable unless otherwise noted    Recent labs reviewed    ROS:  Constitutional, HEENT, cardiovascular, pulmonary, GI, , musculoskeletal, neuro, skin, endocrine and psych systems are negative, except as in HPI or otherwise noted     This document serves as a record of the services and decisions personally performed and made by Julius Hassna MD FAA. It was created on their behalf by Malick Lopez, a trained medical scribe. The creation of this document is based the provider's statements to the medical scribe.  Malick Lopez February 12, 2018 4:38 PM      OBJECTIVE:                                                    /72  Pulse 89  Temp 98.3  F (36.8  C) (Oral)  Ht 5' 8.5\" (1.74 m)  Wt 260 lb (117.9 kg)  LMP 12/11/2011  SpO2 90%  BMI 38.96 kg/m2  Body mass index is 38.96 kg/(m^2).  GENERAL: healthy, alert and no distress, morbidly obese, pt in wheelchair  HENT: ear canals and TM's normal upon viewing with otoscope, nose and mouth without ulcers or lesions upon viewing with otoscope  RESP: lungs clear to auscultation - no rales, no rhonchi, no wheezes  CV: regular " rates and rhythm, normal S1 S2, no S3 or S4 and no murmur, no click or rub -  MS: extremities- no gross deformities noted, mild bilateral LE edema (wearing compression stockings)  SKIN: no suspicious lesions, no rashes to visible skin  NEURO: mentation intact and speech normal  PSYCH: affect normal    DIAGNOSTICS/PROCEDURES:                                                      Results for orders placed or performed in visit on 02/12/18 (from the past 24 hour(s))   *UA reflex to Microscopic and Culture ([facility] (except [place] and [place])   Result Value Ref Range    Color Urine Yellow     Appearance Urine Cloudy     Glucose Urine Negative NEG^Negative mg/dL    Bilirubin Urine Negative NEG^Negative    Ketones Urine Negative NEG^Negative mg/dL    Specific Gravity Urine 1.025 1.003 - 1.035    Blood Urine Moderate (A) NEG^Negative    pH Urine 5.5 5.0 - 7.0 pH    Protein Albumin Urine 100 (A) NEG^Negative mg/dL    Urobilinogen Urine 0.2 0.2 - 1.0 EU/dL    Nitrite Urine Positive (A) NEG^Negative    Leukocyte Esterase Urine Small (A) NEG^Negative    Source Midstream Urine    Urine Microscopic   Result Value Ref Range    WBC Urine >100 (A) OTO2^O - 2 /HPF    RBC Urine 25-50 (A) OTO2^O - 2 /HPF    Squamous Epithelial /LPF Urine Few FEW^Few /LPF    Bacteria Urine Many (A) NEG^Negative /HPF        ASSESSMENT/PLAN:                                                        ICD-10-CM    1. MS (multiple sclerosis) (H) G35 DULoxetine (CYMBALTA) 30 MG EC capsule     amitriptyline (ELAVIL) 100 MG tablet     Drug  Screen Comprehensive , Urine with Reported Meds ([facility]) (Pain Care Package)     Urine Microscopic   2. Lumbar radiculopathy M54.16 DULoxetine (CYMBALTA) 30 MG EC capsule     amitriptyline (ELAVIL) 100 MG tablet   3. Chronic pain syndrome G89.4 DULoxetine (CYMBALTA) 30 MG EC capsule     amitriptyline (ELAVIL) 100 MG tablet     Drug  Screen Comprehensive , Urine with Reported Meds ([facility]) (Pain Care  Package)   4. Moderate depressive episode (H) F32.1 DULoxetine (CYMBALTA) 30 MG EC capsule     amitriptyline (ELAVIL) 100 MG tablet   5. 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 Prediabetes R73.03 *UA reflex to Microscopic and Culture ([facility] (except [place] and [place])     Albumin Random Urine Quantitative with Creat Ratio     Drug  Screen Comprehensive , Urine with Reported Meds ([facility]) (Pain Care Package)   7. Hypertension goal BP (blood pressure) < 140/90 I10    8. Morbid obesity (H) E66.01    9. Tobacco abuse Z72.0    10. Controlled substance agreement signed Z79.899    11. Medication monitoring encounter Z51.81 *UA reflex to Microscopic and Culture ([facility] (except [place] and [place])     Albumin Random Urine Quantitative with Creat Ratio   12. Nonspecific finding on examination of urine R82.90 Urine Culture Aerobic Bacterial     Discussed treatment/modality options, including risk and benefits, she desires advised tobacco cessation (offered help), further diagnostic(s), further health care maintenance, further lab(s), medication changes (increase Cymbalta to 60 mg twice daily), medication refill(s), OTC meds and observation. All diagnosis above reviewed and noted above, otherwise stable.  See [facility] orders for further details.  Follow up in 3 month(s) and as needed.    - Controlled substance agreement reviewed and signed today.     Health Maintenance Due   Topic Date Due     FIT Q1 YR  05/08/1973     DEPRESSION ACTION PLAN Q1 YR  05/08/1981     HEPATITIS C SCREENING  05/08/1981     LIPID MONITORING Q1 YEAR  05/02/2017     WELLNESS VISIT Q1 YR  05/02/2017     URINE DRUG SCREEN Q1 YR  06/09/2017     DEXA Q5 YR  06/26/2017     MAMMO Q1 YR  08/16/2017     PHQ-9 Q6 MONTHS  02/18/2018     Patient Instructions     Try taking Elavil and Gabapentin 30 minutes earlier to help with sleep    Shift Oxycodone to 3AM, 9AM, 3PM, 9PM - as  discussed    Increase Cymbalta to 60 mg, twice daily    Controlled substance agreement reviewed and signed today    QuitPlan.com or 9-552-ENBZUNR are useful resources for quitting tobacco, follow up with our clinic if you desire additional assistance     [employer] for compression stockings -- medium pressure    Follow up for fasting labs when able -- call ahead to make an appointment     The information in this document, created by the medical scribe for me, accurately reflects the services I personally performed and the decisions made by me. I have reviewed and approved this document for accuracy.   Julius Hassan MD FAAFP            Julius Hassan MD [address]  [id number] [phone] Fax

## 2018-02-12 NOTE — PATIENT INSTRUCTIONS
Try taking Elavil and Gabapentin 30 minutes earlier to help with sleep    Shift Oxycodone to 3AM, 9AM, 3PM, 9PM - as discussed    Increase Cymbalta to 60 mg, twice daily    Controlled substance agreement reviewed and signed today    QuitPlan[employer] or 1-761-GHHSOIW are useful resources for quitting tobacco, follow up with our clinic if you desire additional assistance     [employer] for compression stockings -- medium pressure    Follow up for fasting labs when able -- call ahead to make an appointment       [facility]                        To reach your care team during and after hours:   [phone]  To reach our pharmacy:        [phone]    Clinic Hours                        Our clinic hours are:    Monday   7:30 am to 7:00 pm                  Tuesday through Friday 7:30 am to 5:00 pm                             Saturday   8:00 am to 12:00 pm      Sunday   Closed      Pharmacy Hours                        Our pharmacy hours are:    Monday   8:30 am to 7:00 pm       Tuesday to Friday  8:30 am to 6:00 pm                       Saturday    9:00 am to 1:00 pm              Sunday    Closed              There is also information available at our web site:  [email or web address]    If your provider ordered any lab tests and you do not receive the results within 10 business days, please call the clinic.    If you need a medication refill please contact your pharmacy.  Please allow 2-3 business days for your refill to be completed.    Our clinic offers telephone visits and e visits.  Please ask one of your team members to explain more.      Use [employer] (secure email communication and access to your chart) to send your primary care provider a message or make an appointment. Ask someone on your Team how to sign up for [employer].  Immunizations                      Immunization History   Administered Date(s) Administered     Influenza (H1N1) 12/10/2009     Influenza (IIV3) PF 12/10/2009, 11/06/2012,  12/08/2014     Influenza Vaccine IM 3yrs+ 4 Valent IIV4 10/21/2013     Influenza Vaccine, 3 YRS +, IM (QUADRIVALENT W/PRESERVATIVES) 09/28/2017     Pneumococcal 23 valent 01/05/2011     Tdap (Adacel,Boostrix) 09/17/2008        Health Maintenance                         Health Maintenance Due   Topic Date Due     Colon Cancer Screening - FIT Test - yearly  05/08/1973     Hepatitis C Screening  05/08/1981     Cholesterol Lab - yearly  05/02/2017     Wellness Visit with your Primary Provider - yearly  05/02/2017     URINE DRUG SCREEN Q1 YR  06/09/2017     Osteoporosis Screening (Dexa) - every 5 years   06/26/2017     Mammogram - yearly  08/16/2017

## 2018-02-12 NOTE — LETTER
[facility] [place]    02/12/18    Patient: Amanda iRchardson  YOB: 1965  Medical Record Number: [id number]                                                                  Controlled Substance Agreement  I understand that my care provider has prescribed controlled substances (narcotics, tranquilizers, and/or stimulants) to help manage my condition(s).  I am taking this medicine to help me function or work.  I know that this is strong medicine.  It could have serious side effects and even cause a dependency on the drug.  If I stop these medicines suddenly, I could have severe withdrawal symptoms.    The risks, benefits, and side effects of these medication(s) were explained to me.  I agree that:  1. I will take part in other treatments as advised by my provider.  This may be psychiatry or counseling, physical therapy, behavioral therapy, group treatment, or a referral to a pain clinic.  I will reduce or stop my medicine when my provider tells me to do so.   2. I will take my medicines as prescribed.  I will not change the dose or schedule unless my provider tells me to.  There will be no refills if I  run out early.   I may be contacted at any time without warning and asked to complete a drug test or pill count.   3. I will keep all my appointments at the clinic.  If I miss appointments or fail to follow instructions, my provider may stop my medicine.  4. I will not ask other providers to prescribe controlled substances. And I will not accept controlled substances from other people. If I need another prescribed controlled substance for a new reason, I will notify my provider within one business day.  5. If I enroll in the [place] Medical Marijuana program, I will tell my provider.  I will also sign an agreement to share my medical records with my provider.  6. I will use one pharmacy to fill all of my controlled substance prescriptions.  If my prescription is mailed to my pharmacy, it may  take 5 to 7 days for my medicine to be ready.  7. I understand that my provider, clinic care team, and pharmacy can track controlled substance prescriptions from other providers through a central database (prescription monitoring program).  8. I will bring in my list of medications (or my medicine bottles) each time I come to the clinic.  REV- 04/2016                                                                                                                                            Page 1 of 2      [facility] [place]    02/12/18    Patient: Amanda Richardson  YOB: 1965  Medical Record Number: [id number]    9. Refills of controlled substances will be made only during office hours.  It is up to me to make sure that I do not run out of my medicines on weekends or holidays.    10. I am responsible for my prescriptions.  If the medicine is lost or stolen, it will not be replaced.   I also agree not to share these medicines with anyone.  11. I agree to not use ANY illegal or recreational drugs.  This includes marijuana, cocaine, bath salts or other drugs.  I agree not to use alcohol unless my provider says I may.  I agree to give urine samples whenever asked.  If I fail to give a urine sample, the provider may stop my medicine.     12. I will tell my nurse or provider right away if I become pregnant or have a new medical problem treated outside of [facility].  13. I understand that this medicine can affect my thinking and judgment.  It may be unsafe for me to drive, use machinery and do dangerous tasks.  I will not do any of these things until I know how the medicine affects me.  If my dose changes, I will wait to see how it affects me.  I will contact my provider if I have concerns about medicine side effects.  I understand that if I do not follow any of the conditions above, my prescriptions or treatment may be stopped.    I agree that my provider, clinic care team, and pharmacy may  work with any city, state or federal law enforcement agency that investigates the misuse, sale, or other diversion of my controlled medicine. I will allow my provider to discuss my care with or share a copy of this agreement with any other treating provider, pharmacy or emergency room where I receive care.  I agree to give up (waive) any right of privacy or confidentiality with respect to these authorizations.   I have read this agreement and have asked questions about anything I did not understand.   ___________________________________    ___________________________  Patient Signature                                                           Date and Time  ___________________________________     ____________________________  Witness                                                                            Date and Time  ___________________________________  Julius Hassan MD  REV-  04/2016                                                                                                                                                                 Page 2 of 2

## 2018-02-12 NOTE — NURSING NOTE
"Chief Complaint   Patient presents with     Recheck Medication       Initial /72  Pulse 89  Temp 98.3  F (36.8  C) (Oral)  Ht 5' 8.5\" (1.74 m)  Wt 260 lb (117.9 kg)  LMP 12/11/2011  SpO2 90%  BMI 38.96 kg/m2 Estimated body mass index is 38.96 kg/(m^2) as calculated from the following:    Height as of this encounter: 5' 8.5\" (1.74 m).    Weight as of this encounter: 260 lb (117.9 kg)..  BP completed using cuff size: sapphire Young MA  "

## 2018-02-13 ENCOUNTER — TELEPHONE (OUTPATIENT)
Dept: FAMILY MEDICINE | Facility: CLINIC | Age: 53
End: 2018-02-13

## 2018-02-13 LAB
CREAT UR-MCNC: 107 MG/DL
MICROALBUMIN UR-MCNC: 626 MG/L
MICROALBUMIN/CREAT UR: 585.05 MG/G CR (ref 0–25)

## 2018-02-13 ASSESSMENT — ANXIETY QUESTIONNAIRES

## 2018-02-13 ASSESSMENT — PATIENT HEALTH QUESTIONNAIRE - PHQ9: 5. POOR APPETITE OR OVEREATING: NOT AT ALL

## 2018-02-13 NOTE — TELEPHONE ENCOUNTER
Prior Authorization Retail Medication Request  Medication/Dose: Duloxetine DR 30mg  Diagnosis and ICD code: G35, F32.1, G89.4, M54.16  New/Renewal/Insurance Change PA:   Previously Tried and Failed Therapies:     Insurance ID (if provided): [id number]  Insurance Phone (if provided): [phone]    Any additional info from fax request:     If you received a fax notification from an outside Pharmacy:  Pharmacy Name:[facility]  Pharmacy #:[phone]  Pharmacy Fax:[phone]

## 2018-02-14 LAB
BACTERIA SPEC CULT: ABNORMAL
BACTERIA SPEC CULT: ABNORMAL
SPECIMEN SOURCE: ABNORMAL

## 2018-02-14 ASSESSMENT — PATIENT HEALTH QUESTIONNAIRE - PHQ9: SUM OF ALL RESPONSES TO PHQ QUESTIONS 1-9: 13

## 2018-02-14 ASSESSMENT — ANXIETY QUESTIONNAIRES: GAD7 TOTAL SCORE: 0

## 2018-02-14 NOTE — TELEPHONE ENCOUNTER
PA Initiation    Medication: Duloxetine  Insurance Company: [employer] - Phone [phone] Fax [phone]  Pharmacy Filling the Rx: [facility] [address] [place], [place] [address] [address] [address] [facility] [address]  Filling Pharmacy Phone: [phone]  Filling Pharmacy Fax:    Start Date: 2/14/2018

## 2018-02-15 NOTE — TELEPHONE ENCOUNTER
Prior Authorization Approval    Authorization Effective Date: 2/14/2018  Authorization Expiration Date: 2/14/2019  Medication: Duloxetine - approved   Approved Dose/Quantity:    Reference #:     Insurance Company: [employer] - Phone [phone] Fax [phone]  Expected CoPay:       CoPay Card Available:      Foundation Assistance Needed:    Which Pharmacy is filling the prescription (Not needed for infusion/clinic administered): [facility] [address] [place] [address] [address] AT [facility] OF [facility]  Pharmacy Notified: Yes  Patient Notified: Yes

## 2018-02-16 LAB — PAIN DRUG SCR UR W RPTD MEDS: NORMAL

## 2018-02-19 DIAGNOSIS — R80.9 PROTEINURIA: ICD-10-CM

## 2018-02-19 DIAGNOSIS — N39.0 URINARY TRACT INFECTION: Primary | ICD-10-CM

## 2018-02-19 RX ORDER — CEFDINIR 300 MG/1
300 CAPSULE ORAL 2 TIMES DAILY
Qty: 20 CAPSULE | Refills: 0 | Status: SHIPPED | OUTPATIENT
Start: 2018-02-19 | End: 2018-06-13

## 2018-03-07 ENCOUNTER — MYC REFILL (OUTPATIENT)
Dept: FAMILY MEDICINE | Facility: CLINIC | Age: 53
End: 2018-03-07

## 2018-03-07 ENCOUNTER — MYC MEDICAL ADVICE (OUTPATIENT)
Dept: FAMILY MEDICINE | Facility: CLINIC | Age: 53
End: 2018-03-07

## 2018-03-07 DIAGNOSIS — G89.4 CHRONIC PAIN SYNDROME: ICD-10-CM

## 2018-03-07 DIAGNOSIS — G35 MS (MULTIPLE SCLEROSIS) (H): ICD-10-CM

## 2018-03-08 RX ORDER — OXYCODONE HYDROCHLORIDE 15 MG/1
7.5-15 TABLET ORAL EVERY 4 HOURS PRN
Qty: 120 TABLET | Refills: 0 | Status: SHIPPED | OUTPATIENT
Start: 2018-03-10 | End: 2018-04-05

## 2018-03-08 NOTE — TELEPHONE ENCOUNTER
Controlled Substance Refill Request for oxycodone  Problem List Complete:  Yes    Last Written Prescription Date:  2/10/18  Last Fill Quantity: 120,   # refills: 0    Last Office Visit with [facility] primary care provider: 2/12/18    Clinic visit frequency required: Q 3 months     Future Office visit:     Controlled substance agreement on file: Yes:  Date 6/17/16.     Processing:  Patient will  in clinic    Katharina Goodwin

## 2018-03-08 NOTE — TELEPHONE ENCOUNTER
03/07/2018 [employer] Message:   Dr. Hassan,     I have requested a refill of my oxycodone prescription. Can somebody let me know when Fernando can come by the clinic and pick it up? Thank you very much.     Amanda Kelly RN  [facility]

## 2018-03-08 NOTE — TELEPHONE ENCOUNTER
Message from Kasumi[employer]souhart:  Original authorizing provider: MD Amanda Viera would like a refill of the following medications:  oxyCODONE IR (ROXICODONE) 15 MG tablet [Julius Hassan MD]    Preferred pharmacy: WRITTEN PRESCRIPTION REQUESTED    Comment:

## 2018-03-23 DIAGNOSIS — I10 HYPERTENSION GOAL BP (BLOOD PRESSURE) < 140/90: ICD-10-CM

## 2018-03-23 RX ORDER — METOPROLOL SUCCINATE 50 MG/1
TABLET, EXTENDED RELEASE ORAL
Qty: 90 TABLET | Refills: 0 | Status: SHIPPED | OUTPATIENT
Start: 2018-03-23 | End: 2018-06-25

## 2018-03-23 NOTE — TELEPHONE ENCOUNTER
"Requested Prescriptions   Pending Prescriptions Disp Refills     metoprolol succinate (TOPROL-XL) 50 MG 24 hr tablet [Pharmacy Med Name: METOPROLOL ER 50MG  TAB] 90 tablet 0     Sig: TAKE ONE TABLET BY MOUTH ONCE DAILY    Last Refill:   Medication Detail         Disp Refills Start End AISHA     metoprolol (TOPROL-XL) 50 MG 24 hr tablet 90 tablet 0 12/21/2017  No     Sig: TAKE ONE TABLET BY MOUTH ONCE DAILY       Beta-Blockers Protocol Passed    3/23/2018 10:32 AM       Passed - Blood pressure under 140/90 in past 12 months    BP Readings from Last 3 Encounters:   02/12/18 134/72   11/01/17 110/62   10/17/17 100/57            Passed - Patient is age 6 or older       Passed - Recent (12 mo) or future (30 days) visit within the authorizing provider's specialty    Patient had office visit in the last 12 months or has a visit in the next 30 days with authorizing provider or within the authorizing provider's specialty.  See \"Patient Info\" tab in inbasket, or \"Choose Columns\" in Meds & Orders section of the refill encounter.      LOV: 02/12/2018          Refilled per RN Protocol.     Ivanna Kelly RN  [place] [facility]      "

## 2018-04-05 DIAGNOSIS — G89.4 CHRONIC PAIN SYNDROME: ICD-10-CM

## 2018-04-05 DIAGNOSIS — G35 MS (MULTIPLE SCLEROSIS) (H): ICD-10-CM

## 2018-04-05 RX ORDER — OXYCODONE HYDROCHLORIDE 15 MG/1
7.5-15 TABLET ORAL EVERY 4 HOURS PRN
Qty: 120 TABLET | Refills: 0 | Status: SHIPPED | OUTPATIENT
Start: 2018-04-09 | End: 2018-05-07

## 2018-04-05 NOTE — TELEPHONE ENCOUNTER
Medication Detail         Disp Refills Start End AISHA     oxyCODONE IR (ROXICODONE) 15 MG tablet 120 tablet 0 3/10/2018  No     Sig: Take 0.5-1 tablets (7.5-15 mg) by mouth every 4 hours as needed for moderate to severe pain Limit 4 tablet(s) per day.     Problem List Complete:  Yes    Last Office Visit with [facility] primary care provider: 02/12/2018    Clinic visit frequency required: none noted     Future Office visit:     Controlled substance agreement on file: Yes:  Date 02/12/2018.     Processing:  Patient will  in clinic (pt's spouse)    Routing refill request to provider for review/approval because:  Drug not on the [facility] refill protocol       Ivanna Kelly RN  [place] Triage  rx done, due 4/10

## 2018-04-05 NOTE — TELEPHONE ENCOUNTER
Reason for Call:  Medication or medication refill:    Do you use a [facility]?  Name of the pharmacy and phone number for the current request:  [place]     Name of the medication requested: oxyCODONE IR (ROXICODONE) 15 MG tablet    Other request: The patient says her  Fernando will be picking up this prescription. There is a consent to communicate on file.    Can we leave a detailed message on this number? YES    Phone number patient can be reached at: Home number on file [phone] (home)    Best Time: Anytime    Call taken on 4/5/2018 at 12:21 PM by Tiffany Wood

## 2018-04-06 NOTE — TELEPHONE ENCOUNTER
Patient called to check status of refill. Told her rx was done and will be at the . Pt  Fernando will  rx. Attaching a consent to communicate as well.

## 2018-04-09 NOTE — TELEPHONE ENCOUNTER
rx picked up by miya King on 4/6/18 - C2C given, current on file not specific.  Katharina Goodwin

## 2018-05-01 DIAGNOSIS — M79.604 CHRONIC PAIN OF BOTH LOWER EXTREMITIES: ICD-10-CM

## 2018-05-01 DIAGNOSIS — M79.605 CHRONIC PAIN OF BOTH LOWER EXTREMITIES: ICD-10-CM

## 2018-05-01 DIAGNOSIS — G89.29 CHRONIC PAIN OF BOTH LOWER EXTREMITIES: ICD-10-CM

## 2018-05-01 RX ORDER — GABAPENTIN 600 MG/1
TABLET ORAL
Qty: 270 TABLET | Refills: 1 | Status: ON HOLD | OUTPATIENT
Start: 2018-05-01 | End: 2019-02-15

## 2018-05-01 NOTE — TELEPHONE ENCOUNTER
gabapentin (NEURONTIN) 600 MG tablet 270 tablet 1 8/18/2017  No   Sig: Take 1 tablet (600 mg) by mouth 3 times daily May take an additional 300 mg per day as needed   Class: E-Prescribe   Route: Oral       Last Office Visit: 2/12/2018  Future Office visit:       CSA on file     Routing refill request to provider for review/approval because:  Drug not on the [facility], [facility] or [facility] refill protocol or controlled substance    Cordelia Stevens RN, BSN  [facility]

## 2018-05-07 ENCOUNTER — MYC MEDICAL ADVICE (OUTPATIENT)
Dept: FAMILY MEDICINE | Facility: CLINIC | Age: 53
End: 2018-05-07

## 2018-05-07 ENCOUNTER — MYC REFILL (OUTPATIENT)
Dept: FAMILY MEDICINE | Facility: CLINIC | Age: 53
End: 2018-05-07

## 2018-05-07 DIAGNOSIS — G35 MS (MULTIPLE SCLEROSIS) (H): ICD-10-CM

## 2018-05-07 DIAGNOSIS — G89.4 CHRONIC PAIN SYNDROME: ICD-10-CM

## 2018-05-07 RX ORDER — OXYCODONE HYDROCHLORIDE 15 MG/1
7.5-15 TABLET ORAL EVERY 4 HOURS PRN
Qty: 120 TABLET | Refills: 0 | Status: SHIPPED | OUTPATIENT
Start: 2018-05-09 | End: 2018-06-05

## 2018-05-07 NOTE — TELEPHONE ENCOUNTER
Controlled Substance Refill Request for Oxycodone  Problem List Complete:  Yes    Last Written Prescription Date:  4/9/2018  Last Fill Quantity: 120,   # refills: 0    Last Office Visit with [facility] primary care provider: 2/12/2018    Clinic visit frequency required: Q 3 months     Future Office visit:   Next 5 appointments (look out 90 days)     Jun 13, 2018  4:00 PM CDT   Office Visit with Julius Hassan MD   [facility]    [address] [place] [id number]   [phone]                  Controlled substance agreement on file: Yes:  Date 2/28/2018.     Processing:  Patient will  in clinic Patient would like , Fernando, to  today if possible.   checked in past 6 months?  Yes 5/7/2018, no concerns noted.     Routing refill request to provider for review/approval because:  Drug not on the [facility] refill protocol         LILLIANA Deng, RN, PHN  [facility] Triage  ) [phone]

## 2018-05-07 NOTE — TELEPHONE ENCOUNTER
Noted. Will update refill encounter.    Chandrika Nathan, BS, RN, N  [facility]) [phone]

## 2018-05-07 NOTE — TELEPHONE ENCOUNTER
Message from [employer]:  Original authorizing provider: MD Amanda Viera would like a refill of the following medications:  oxyCODONE IR (ROXICODONE) 15 MG tablet [Julius Hassan MD]    Preferred pharmacy: [facility] [address] [place] [address] [address] AT [facility] OF [address]    Comment:

## 2018-06-05 ENCOUNTER — MYC REFILL (OUTPATIENT)
Dept: FAMILY MEDICINE | Facility: CLINIC | Age: 53
End: 2018-06-05

## 2018-06-05 DIAGNOSIS — G89.4 CHRONIC PAIN SYNDROME: ICD-10-CM

## 2018-06-05 DIAGNOSIS — G35 MS (MULTIPLE SCLEROSIS) (H): ICD-10-CM

## 2018-06-05 RX ORDER — OXYCODONE HYDROCHLORIDE 15 MG/1
7.5-15 TABLET ORAL EVERY 4 HOURS PRN
Qty: 120 TABLET | Refills: 0 | Status: SHIPPED | OUTPATIENT
Start: 2018-06-08 | End: 2018-07-05

## 2018-06-05 NOTE — TELEPHONE ENCOUNTER
Message from [employer]:  Original authorizing provider: MD Amanda Viera would like a refill of the following medications:  oxyCODONE IR (ROXICODONE) 15 MG tablet [Julius Hassan MD]    Preferred pharmacy: [facility] [address] [place] [address] [address]    Comment:

## 2018-06-05 NOTE — TELEPHONE ENCOUNTER
Controlled Substance Refill Request for oxycodone  Problem List Complete:  Yes    Last Written Prescription Date:  5/9/18  Last Fill Quantity: 120,   # refills: 0    Last Office Visit with [facility] primary care provider: 2/12/18    Clinic visit frequency required: Q 3 months     Future Office visit:   Next 5 appointments (look out 90 days)     Jun 13, 2018  4:00 PM CDT   [facility] MD   [facility]    [address] [place] [id number]   [phone]                  Controlled substance agreement on file: Yes:  Date 6/17/16.     Processing:  Patient will  in clinic -  jessi will  c2c on file    Katharina Goodwin

## 2018-06-13 ENCOUNTER — OFFICE VISIT (OUTPATIENT)
Dept: FAMILY MEDICINE | Facility: CLINIC | Age: 53
End: 2018-06-13
Payer: COMMERCIAL

## 2018-06-13 VITALS
SYSTOLIC BLOOD PRESSURE: 124 MMHG | TEMPERATURE: 97.7 F | OXYGEN SATURATION: 94 % | HEART RATE: 85 BPM | DIASTOLIC BLOOD PRESSURE: 64 MMHG | BODY MASS INDEX: 38.51 KG/M2 | HEIGHT: 69 IN | WEIGHT: 260 LBS

## 2018-06-13 DIAGNOSIS — C85.90 T-CELL LYMPHOMA (H): ICD-10-CM

## 2018-06-13 DIAGNOSIS — E66.01 SEVERE OBESITY WITH BODY MASS INDEX (BMI) OF 35.0 TO 35.9 AND COMORBIDITY (H): ICD-10-CM

## 2018-06-13 DIAGNOSIS — G89.29 CHRONIC PAIN OF BOTH LOWER EXTREMITIES: ICD-10-CM

## 2018-06-13 DIAGNOSIS — R26.9 ABNORMALITY OF GAIT: ICD-10-CM

## 2018-06-13 DIAGNOSIS — Z72.0 TOBACCO ABUSE: ICD-10-CM

## 2018-06-13 DIAGNOSIS — C85.81 OTHER SPECIFIED TYPE OF NON-HODGKIN LYMPHOMA OF HEAD (H): ICD-10-CM

## 2018-06-13 DIAGNOSIS — R73.03 PREDIABETES: ICD-10-CM

## 2018-06-13 DIAGNOSIS — M79.605 CHRONIC PAIN OF BOTH LOWER EXTREMITIES: ICD-10-CM

## 2018-06-13 DIAGNOSIS — Z51.81 MEDICATION MONITORING ENCOUNTER: ICD-10-CM

## 2018-06-13 DIAGNOSIS — G35 MS (MULTIPLE SCLEROSIS) (H): Primary | ICD-10-CM

## 2018-06-13 DIAGNOSIS — Z13.820 SCREENING FOR OSTEOPOROSIS: ICD-10-CM

## 2018-06-13 DIAGNOSIS — F32.A MODERATE DEPRESSIVE EPISODE: ICD-10-CM

## 2018-06-13 DIAGNOSIS — I10 HYPERTENSION GOAL BP (BLOOD PRESSURE) < 140/90: ICD-10-CM

## 2018-06-13 DIAGNOSIS — Z12.11 SCREEN FOR COLON CANCER: ICD-10-CM

## 2018-06-13 DIAGNOSIS — Z12.39 SCREENING FOR BREAST CANCER: ICD-10-CM

## 2018-06-13 DIAGNOSIS — M48.061 SPINAL STENOSIS OF LUMBAR REGION, UNSPECIFIED WHETHER NEUROGENIC CLAUDICATION PRESENT: ICD-10-CM

## 2018-06-13 DIAGNOSIS — M79.604 CHRONIC PAIN OF BOTH LOWER EXTREMITIES: ICD-10-CM

## 2018-06-13 DIAGNOSIS — Z11.4 SCREENING FOR HUMAN IMMUNODEFICIENCY VIRUS: ICD-10-CM

## 2018-06-13 DIAGNOSIS — Z11.59 NEED FOR HEPATITIS C SCREENING TEST: ICD-10-CM

## 2018-06-13 PROCEDURE — 99214 OFFICE O/P EST MOD 30 MIN: CPT | Performed by: FAMILY MEDICINE

## 2018-06-13 RX ORDER — BACLOFEN 10 MG/1
10 TABLET ORAL 3 TIMES DAILY
Qty: 120 TABLET | Refills: 1 | Status: ON HOLD
Start: 2018-06-13 | End: 2019-02-15

## 2018-06-13 NOTE — MR AVS SNAPSHOT
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)        T-cell lymphoma (H)        Prediabetes        Spinal stenosis of lumbar region, unspecified whether neurogenic claudication present        Hypertension goal BP (blood pressure) < 140/90        Moderate depressive episode (H)        Severe obesity with body mass index (BMI) of 35.0 to 35.9 and comorbidity (H)        Tobacco abuse        Medication monitoring encounter        Screen for colon cancer        Screening for breast cancer        Screening for osteoporosis        Need for hepatitis C screening test        Screening for human immunodeficiency virus          Care Instructions    Follow up for lab-only fasting appointment, when able     Follow up with Occupational Therapy to be fitted for a 4-wheeled mobility scooter     Follow up for Mammogram and DEXA bone density scan, as discussed      [facility] [place]                        To reach your care team during and after hours:   [phone]  To reach our pharmacy:        [phone]    Clinic Hours                        Our clinic hours are:    Monday   7:30 am to 7:00 pm                  Tuesday through Friday 7:30 am to 5:00 pm                             Saturday   8:00 am to 12:00 pm      Sunday   Closed      Pharmacy Hours                        Our pharmacy hours are:    Monday   8:30 am to 7:00 pm       Tuesday to Friday  8:30 am to 6:00 pm                       Saturday    9:00 am to 1:00 pm              Sunday    Closed              There is also information available  at our web site:  [email or web address]    If your provider ordered any lab tests and you do not receive the results within 10 business days, please call the clinic.    If you need a medication refill please contact your pharmacy.  Please allow 2-3 business days for your refill to be completed.    Our clinic offers telephone visits and e visits.  Please ask one of your team members to explain more.      Use K[employer]art (secure email communication and access to your chart) to send your primary care provider a message or make an appointment. Ask someone on your Team how to sign up for Netechyt.  Immunizations                      Immunization History   Administered Date(s) Administered     Influenza (H1N1) 12/10/2009     Influenza (IIV3) PF 12/10/2009, 11/06/2012, 12/08/2014     Influenza Vaccine IM 3yrs+ 4 Valent IIV4 10/21/2013     Influenza Vaccine, 3 YRS +, IM (QUADRIVALENT W/PRESERVATIVES) 09/28/2017     Pneumococcal 23 valent 01/05/2011     Tdap (Adacel,Boostrix) 09/17/2008        Health Maintenance                         Health Maintenance Due   Topic Date Due     Colon Cancer Screening - FIT Test - yearly  05/08/1973     Depression Action Plan Review - yearly  05/08/1981     HIV SCREEN (SYSTEM ASSIGNED)  05/08/1981     Hepatitis C Screening  05/08/1981     Cholesterol Lab - yearly  05/02/2017     Wellness Visit with your Primary Provider - yearly  05/02/2017     Osteoporosis Screening (Dexa) - every 5 years   06/26/2017     Mammogram - yearly  08/16/2017               Follow-ups after your visit        Additional Services     OCCUPATIONAL THERAPY REFERRAL       *This therapy referral will be filtered to a centralized scheduling office at [facility] and the patient will receive a call to schedule an appointment at a [place] location most convenient for them. *     [facility] provides Occupational Therapy evaluation and treatment and many specialty services across the [place]  "system.  If requesting a specialty program, please choose from the list below.    If you have not heard from the scheduling office within 2 business days, please call [phone] for all locations, with the exception of [place], please call [phone] and [place], please call [phone]    Treatment: Evaluation & Treatment  Special Instructions/Modalities: eval and treat, MS, motorized scooter  Special Programs: see above    Please be aware that coverage of these services is subject to the terms and limitations of your health insurance plan.  Call member services at your health plan with any benefit or coverage questions.      **Note to Provider:  If you are referring outside of [place] for the therapy appointment, please list the name of the location in the \"special instructions\" above, print the referral and give to the patient to schedule the appointment.            PHYSICAL THERAPY REFERRAL       *This therapy referral will be filtered to a centralized scheduling office at [facility] and the patient will receive a call to schedule an appointment at a [place] location most convenient for them. *     [facility] provides Physical Therapy evaluation and treatment and many specialty services across the [place] system.  If requesting a specialty program, please choose from the list below.    If you have not heard from the scheduling office within 2 business days, please call [phone] for all locations, with the exception of [place], please call [phone] and [place], please call [phone]  Treatment: Evaluation & Treatment  Special Instructions/Modalities: eval and treat, MS, motorized scooter  Special Programs: see above    Please be aware that coverage of these services is subject to the terms and limitations of your health insurance plan.  Call member services at your health plan with any benefit or coverage questions.      **Note to Provider:  If " "you are referring outside of [place] for the therapy appointment, please list the name of the location in the \"special instructions\" above, print the referral and give to the patient to schedule the appointment.                  Follow-up notes from your care team     Return in about 3 months (around 9/13/2018), or if symptoms worsen or fail to improve, for Lab Work, Med Check.      Your next 10 appointments already scheduled     Jun 20, 2018  3:30 PM CDT   LAB with RV LAB   [facility]    [address] [place] [id number]   [phone]           Please do not eat 10-12 hours before your appointment if you are coming in fasting for labs on lipids, cholesterol, or glucose (sugar). This does not apply to pregnant women. Water, hot tea and black coffee (with nothing added) are okay. Do not drink other fluids, diet soda or chew gum.            Sep 19, 2018  4:00 PM CDT   SHORT with Julius Hassan MD   [facility]    [address] [place] [id number]   [phone]              Future tests that were ordered for you today     Open Future Orders        Priority Expected Expires Ordered    Comprehensive metabolic panel Routine 7/13/2018 6/13/2019 6/13/2018    Lipid panel reflex to direct LDL Fasting Routine 7/13/2018 6/13/2019 6/13/2018    CK total Routine 7/13/2018 6/13/2019 6/13/2018    CBC with platelets Routine 7/13/2018 6/13/2019 6/13/2018    TSH with free T4 reflex Routine 7/13/2018 6/13/2019 6/13/2018    Hemoglobin A1c Routine 7/13/2018 6/13/2019 6/13/2018    Albumin Random Urine Quantitative with Creat Ratio Routine  6/13/2019 6/13/2018    *UA reflex to Microscopic and Culture ([place] [facility] (except [place] and [place]) Routine  6/13/2019 6/13/2018    Drug  Screen Comprehensive , Urine with Reported Meds ([facility]) (Pain Care Package) Routine  6/13/2019 6/13/2018    " "Fecal colorectal cancer screen (FIT) Routine 7/4/2018 6/13/2019 6/13/2018    *MA Screening Digital Bilateral Routine  6/13/2019 6/13/2018    DX Hip/Pelvis/Spine Routine  6/13/2019 6/13/2018    **HIV Antigen Antibody Combo FUTURE anytime Routine 6/13/2018 6/13/2019 6/13/2018    Hepatitis C Screen Reflex to HCV RNA Quant and Genotype Routine  6/13/2019 6/13/2018            Who to contact     If you have questions or need follow up information about today's clinic visit or your schedule please contact [facility] directly at [phone].  Normal or non-critical lab and imaging results will be communicated to you by Abcodiahart, letter or phone within 4 business days after the clinic has received the results. If you do not hear from us within 7 days, please contact the clinic through Datalinkt or phone. If you have a critical or abnormal lab result, we will notify you by phone as soon as possible.  Submit refill requests through FileThis or call your pharmacy and they will forward the refill request to us. Please allow 3 business days for your refill to be completed.          Additional Information About Your Visit        [employer]iahar[employer]ceanea Information     FileThis gives you secure access to your electronic health record. If you see a primary care provider, you can also send messages to your care team and make appointments. If you have questions, please call your primary care clinic.  If you do not have a primary care provider, please call [phone] and they will assist you.        Care EveryWhere ID     This is your Care EveryWhere ID. This could be used by other organizations to access your [facility] medical records  [id number]        Your Vitals Were     Pulse Temperature Height Last Period Pulse Oximetry BMI (Body Mass Index)    85 97.7  F (36.5  C) (Oral) 5' 8.5\" (1.74 m) 12/11/2011 94% 38.96 kg/m2       Blood Pressure from Last 3 Encounters:   06/13/18 124/64   02/12/18 134/72   11/01/17 110/62    Weight from " Last 3 Encounters:   06/13/18 260 lb (117.9 kg)   02/12/18 260 lb (117.9 kg)   11/01/17 250 lb (113.4 kg)              We Performed the Following     OCCUPATIONAL THERAPY REFERRAL     PHYSICAL THERAPY REFERRAL          Today's Medication Changes          These changes are accurate as of 6/13/18  5:14 PM.  If you have any questions, ask your nurse or doctor.               These medicines have changed or have updated prescriptions.        Dose/Directions    baclofen 10 MG tablet   Commonly known as:  LIORESAL   This may have changed:    - how much to take  - additional instructions   Used for:  Chronic pain of both lower extremities        Dose:  10 mg   Take 1 tablet (10 mg) by mouth 3 times daily May take an additional 10 mg as needed qd for total of 40 mg per day   Quantity:  120 tablet   Refills:  1         Stop taking these medicines if you haven't already. Please contact your care team if you have questions.     albuterol (2.5 MG/3ML) 0.083% neb solution   Stopped by:  Julius Hassan MD           ATIVAN 0.5 MG tablet   Generic drug:  LORazepam   Stopped by:  Julius Hassan MD           ipratropium - albuterol 0.5 mg/2.5 mg/3 mL 0.5-2.5 (3) MG/3ML neb solution   Commonly known as:  DUONEB   Stopped by:  Julius Hassan MD                Where to get your medicines      Some of these will need a paper prescription and others can be bought over the counter.  Ask your nurse if you have questions.     You don't need a prescription for these medications     baclofen 10 MG tablet                Primary Care Provider Office Phone # Fax #    Julius Hassan [phone] [address] [facility] [address]        Goals        General    I will continue to work with home care until discharge goals are met. (pt-stated)     Notes - Note edited  10/28/2015  3:09 PM by Cordelia Moreland RN    As of today's date 9/22/2015 goal is met at 76 - 100%.   Goal Status:  Ongoing  As of today's date 10/28/2015 goal is  met at 76 - 100%.   Goal Status:  Complete            Equal Access to Services     ALLSIOMARA JAMIE : Hadii martha swain ngatashia Alishaali, galileochrista escuderojosiahha, oscarcuauhtemoc corbettlobitochrista ferrellmiltonchrista, marco crespo haybelen marquezrangeljose valdez. So [facility] [phone].    ATENCIÓN: Si habla español, tiene a woods disposición servicios gratuitos de asistencia lingüística. Llame al [phone].    We comply with applicable federal civil rights laws and [place] laws. We do not discriminate on the basis of race, color, national origin, age, disability, sex, sexual orientation, or gender identity.            Thank you!     Thank you for choosing [facility]  for your care. Our goal is always to provide you with excellent care. Hearing back from our patients is one way we can continue to improve our services. Please take a few minutes to complete the written survey that you may receive in the mail after your visit with us. Thank you!             Your Updated Medication List - Protect others around you: Learn how to safely use, store and throw away your medicines at www.disposemymeds.org.          This list is accurate as of 6/13/18  5:14 PM.  Always use your most recent med list.                   Brand Name Dispense Instructions for use Diagnosis    amitriptyline 100 MG tablet    ELAVIL    90 tablet    Take 1 tablet (100 mg) by mouth At Bedtime    Lumbar radiculopathy, Chronic pain syndrome, MS (multiple sclerosis) (H), Moderate depressive episode (H)       ASPIRIN PO      Take 162 mg by mouth daily        baclofen 10 MG tablet    LIORESAL    120 tablet    Take 1 tablet (10 mg) by mouth 3 times daily May take an additional 10 mg as needed qd for total of 40 mg per day    Chronic pain of both lower extremities       DULoxetine 30 MG EC capsule    CYMBALTA    360 capsule    Take 2 capsules (60 mg) by mouth 2 times daily    Lumbar radiculopathy, MS (multiple sclerosis) (H), Chronic pain syndrome, Moderate depressive episode (H)        gabapentin 600 MG tablet    NEURONTIN    270 tablet    TAKE ONE TABLET BY MOUTH THREE TIMES DAILY AND MAY TAKE ONE ADDITIONAL AS NEEDED    Chronic pain of both lower extremities       losartan-hydrochlorothiazide 100-25 MG per tablet    HYZAAR    90 tablet    TAKE ONE TABLET BY MOUTH ONCE DAILY    Hypertension goal BP (blood pressure) < 140/90       metoprolol succinate 50 MG 24 hr tablet    TOPROL-XL    90 tablet    TAKE ONE TABLET BY MOUTH ONCE DAILY    Hypertension goal BP (blood pressure) < 140/90       MULTI-VITAMIN PO      Take 1 tablet by mouth daily        omega-3 fatty acids 1200 MG capsule      Take 1 capsule by mouth daily.        oxyCODONE IR 15 MG tablet    ROXICODONE    120 tablet    Take 0.5-1 tablets (7.5-15 mg) by mouth every 4 hours as needed for moderate to severe pain Limit 4 tablet(s) per day.    MS (multiple sclerosis) (H), Chronic pain syndrome       VITAMIN D PO      Take 1 tablet by mouth daily 1000 IU daily

## 2018-06-13 NOTE — PATIENT INSTRUCTIONS
Follow up for lab-only fasting appointment, when able     Follow up with Occupational Therapy to be fitted for a 4-wheeled mobility scooter     Follow up for Mammogram and DEXA bone density scan, as discussed      [facility]                        To reach your care team during and after hours:   [phone]  To reach our pharmacy:        [phone]    Clinic Hours                        Our clinic hours are:    Monday   7:30 am to 7:00 pm                  Tuesday through Friday 7:30 am to 5:00 pm                             Saturday   8:00 am to 12:00 pm      Sunday   Closed      Pharmacy Hours                        Our pharmacy hours are:    Monday   8:30 am to 7:00 pm       Tuesday to Friday  8:30 am to 6:00 pm                       Saturday    9:00 am to 1:00 pm              Sunday    Closed              There is also information available at our web site:  [email or web address]    If your provider ordered any lab tests and you do not receive the results within 10 business days, please call the clinic.    If you need a medication refill please contact your pharmacy.  Please allow 2-3 business days for your refill to be completed.    Our clinic offers telephone visits and e visits.  Please ask one of your team members to explain more.      Use Brand[employer] [email or web address] (secure email communication and access to your chart) to send your primary care provider a message or make an appointment. Ask someone on your Team how to sign up for Fuel ([email or web address]).  Immunizations                      Immunization History   Administered Date(s) Administered     Influenza (H1N1) 12/10/2009     Influenza (IIV3) PF 12/10/2009, 11/06/2012, 12/08/2014     Influenza Vaccine IM 3yrs+ 4 Valent IIV4 10/21/2013     Influenza Vaccine, 3 YRS +, IM (QUADRIVALENT W/PRESERVATIVES) 09/28/2017     Pneumococcal 23 valent 01/05/2011     Tdap (Adacel,Boostrix) 09/17/2008        Health Maintenance                         Health Maintenance Due   Topic Date  Due     Colon Cancer Screening - FIT Test - yearly  05/08/1973     Depression Action Plan Review - yearly  05/08/1981     HIV SCREEN (SYSTEM ASSIGNED)  05/08/1981     Hepatitis C Screening  05/08/1981     Cholesterol Lab - yearly  05/02/2017     Wellness Visit with your Primary Provider - yearly  05/02/2017     Osteoporosis Screening (Dexa) - every 5 years   06/26/2017     Mammogram - yearly  08/16/2017

## 2018-06-18 ENCOUNTER — TELEPHONE (OUTPATIENT)
Dept: BONE DENSITY | Facility: CLINIC | Age: 53
End: 2018-06-18

## 2018-06-20 DIAGNOSIS — R73.03 PREDIABETES: ICD-10-CM

## 2018-06-20 DIAGNOSIS — M79.604 CHRONIC PAIN OF BOTH LOWER EXTREMITIES: ICD-10-CM

## 2018-06-20 DIAGNOSIS — G35 MS (MULTIPLE SCLEROSIS) (H): ICD-10-CM

## 2018-06-20 DIAGNOSIS — Z11.59 NEED FOR HEPATITIS C SCREENING TEST: ICD-10-CM

## 2018-06-20 DIAGNOSIS — R82.90 NONSPECIFIC FINDING ON EXAMINATION OF URINE: Primary | ICD-10-CM

## 2018-06-20 DIAGNOSIS — G89.29 CHRONIC PAIN OF BOTH LOWER EXTREMITIES: ICD-10-CM

## 2018-06-20 DIAGNOSIS — Z11.4 SCREENING FOR HUMAN IMMUNODEFICIENCY VIRUS: ICD-10-CM

## 2018-06-20 DIAGNOSIS — Z51.81 MEDICATION MONITORING ENCOUNTER: ICD-10-CM

## 2018-06-20 DIAGNOSIS — C85.90 T-CELL LYMPHOMA (H): ICD-10-CM

## 2018-06-20 DIAGNOSIS — M79.605 CHRONIC PAIN OF BOTH LOWER EXTREMITIES: ICD-10-CM

## 2018-06-20 DIAGNOSIS — R26.9 ABNORMALITY OF GAIT: ICD-10-CM

## 2018-06-20 DIAGNOSIS — C85.81 OTHER SPECIFIED TYPE OF NON-HODGKIN LYMPHOMA OF HEAD (H): ICD-10-CM

## 2018-06-20 LAB
ALBUMIN UR-MCNC: 100 MG/DL
APPEARANCE UR: CLEAR
BACTERIA #/AREA URNS HPF: ABNORMAL /HPF
BILIRUB UR QL STRIP: NEGATIVE
CAOX CRY #/AREA URNS HPF: ABNORMAL /HPF
COLOR UR AUTO: YELLOW
ERYTHROCYTE [DISTWIDTH] IN BLOOD BY AUTOMATED COUNT: 16.7 % (ref 10–15)
GLUCOSE UR STRIP-MCNC: NEGATIVE MG/DL
HBA1C MFR BLD: 6.8 % (ref 0–5.6)
HCT VFR BLD AUTO: 41.3 % (ref 35–47)
HGB BLD-MCNC: 13.4 G/DL (ref 11.7–15.7)
HGB UR QL STRIP: ABNORMAL
KETONES UR STRIP-MCNC: NEGATIVE MG/DL
LEUKOCYTE ESTERASE UR QL STRIP: ABNORMAL
MCH RBC QN AUTO: 26.1 PG (ref 26.5–33)
MCHC RBC AUTO-ENTMCNC: 32.4 G/DL (ref 31.5–36.5)
MCV RBC AUTO: 81 FL (ref 78–100)
MUCOUS THREADS #/AREA URNS LPF: PRESENT /LPF
NITRATE UR QL: POSITIVE
NON-SQ EPI CELLS #/AREA URNS LPF: ABNORMAL /LPF
PH UR STRIP: 6 PH (ref 5–7)
PLATELET # BLD AUTO: 311 10E9/L (ref 150–450)
RBC # BLD AUTO: 5.13 10E12/L (ref 3.8–5.2)
RBC #/AREA URNS AUTO: ABNORMAL /HPF
SOURCE: ABNORMAL
SP GR UR STRIP: 1.02 (ref 1–1.03)
UROBILINOGEN UR STRIP-ACNC: 1 EU/DL (ref 0.2–1)
WBC # BLD AUTO: 15.7 10E9/L (ref 4–11)
WBC #/AREA URNS AUTO: ABNORMAL /HPF

## 2018-06-20 PROCEDURE — 86803 HEPATITIS C AB TEST: CPT | Performed by: FAMILY MEDICINE

## 2018-06-20 PROCEDURE — 80053 COMPREHEN METABOLIC PANEL: CPT | Performed by: FAMILY MEDICINE

## 2018-06-20 PROCEDURE — 36415 COLL VENOUS BLD VENIPUNCTURE: CPT | Performed by: FAMILY MEDICINE

## 2018-06-20 PROCEDURE — 84443 ASSAY THYROID STIM HORMONE: CPT | Performed by: FAMILY MEDICINE

## 2018-06-20 PROCEDURE — 80061 LIPID PANEL: CPT | Performed by: FAMILY MEDICINE

## 2018-06-20 PROCEDURE — 85027 COMPLETE CBC AUTOMATED: CPT | Performed by: FAMILY MEDICINE

## 2018-06-20 PROCEDURE — 87086 URINE CULTURE/COLONY COUNT: CPT | Performed by: FAMILY MEDICINE

## 2018-06-20 PROCEDURE — 83036 HEMOGLOBIN GLYCOSYLATED A1C: CPT | Performed by: FAMILY MEDICINE

## 2018-06-20 PROCEDURE — 99000 SPECIMEN HANDLING OFFICE-LAB: CPT | Performed by: FAMILY MEDICINE

## 2018-06-20 PROCEDURE — 87389 HIV-1 AG W/HIV-1&-2 AB AG IA: CPT | Performed by: FAMILY MEDICINE

## 2018-06-20 PROCEDURE — 80307 DRUG TEST PRSMV CHEM ANLYZR: CPT | Mod: 90 | Performed by: FAMILY MEDICINE

## 2018-06-20 PROCEDURE — 82043 UR ALBUMIN QUANTITATIVE: CPT | Performed by: FAMILY MEDICINE

## 2018-06-20 PROCEDURE — 81001 URINALYSIS AUTO W/SCOPE: CPT | Mod: 59 | Performed by: FAMILY MEDICINE

## 2018-06-20 PROCEDURE — 87186 SC STD MICRODIL/AGAR DIL: CPT | Performed by: FAMILY MEDICINE

## 2018-06-20 PROCEDURE — 87088 URINE BACTERIA CULTURE: CPT | Performed by: FAMILY MEDICINE

## 2018-06-20 PROCEDURE — 82550 ASSAY OF CK (CPK): CPT | Performed by: FAMILY MEDICINE

## 2018-06-21 LAB
ALBUMIN SERPL-MCNC: 3 G/DL (ref 3.4–5)
ALP SERPL-CCNC: 93 U/L (ref 40–150)
ALT SERPL W P-5'-P-CCNC: 26 U/L (ref 0–50)
ANION GAP SERPL CALCULATED.3IONS-SCNC: 11 MMOL/L (ref 3–14)
AST SERPL W P-5'-P-CCNC: 16 U/L (ref 0–45)
BILIRUB SERPL-MCNC: 0.3 MG/DL (ref 0.2–1.3)
BUN SERPL-MCNC: 17 MG/DL (ref 7–30)
CALCIUM SERPL-MCNC: 8.8 MG/DL (ref 8.5–10.1)
CHLORIDE SERPL-SCNC: 104 MMOL/L (ref 94–109)
CHOLEST SERPL-MCNC: 141 MG/DL
CK SERPL-CCNC: 43 U/L (ref 30–225)
CO2 SERPL-SCNC: 26 MMOL/L (ref 20–32)
CREAT SERPL-MCNC: 0.52 MG/DL (ref 0.52–1.04)
CREAT UR-MCNC: 60 MG/DL
GFR SERPL CREATININE-BSD FRML MDRD: >90 ML/MIN/1.7M2
GLUCOSE SERPL-MCNC: 101 MG/DL (ref 70–99)
HDLC SERPL-MCNC: 33 MG/DL
LDLC SERPL CALC-MCNC: 82 MG/DL
MICROALBUMIN UR-MCNC: 457 MG/L
MICROALBUMIN/CREAT UR: 761.67 MG/G CR (ref 0–25)
NONHDLC SERPL-MCNC: 108 MG/DL
POTASSIUM SERPL-SCNC: 3.8 MMOL/L (ref 3.4–5.3)
PROT SERPL-MCNC: 8 G/DL (ref 6.8–8.8)
SODIUM SERPL-SCNC: 141 MMOL/L (ref 133–144)
TRIGL SERPL-MCNC: 130 MG/DL
TSH SERPL DL<=0.005 MIU/L-ACNC: 2.92 MU/L (ref 0.4–4)

## 2018-06-22 LAB
BACTERIA SPEC CULT: ABNORMAL
HCV AB SERPL QL IA: NONREACTIVE
HIV 1+2 AB+HIV1 P24 AG SERPL QL IA: NONREACTIVE
SPECIMEN SOURCE: ABNORMAL

## 2018-06-25 DIAGNOSIS — I10 HYPERTENSION GOAL BP (BLOOD PRESSURE) < 140/90: ICD-10-CM

## 2018-06-25 RX ORDER — METOPROLOL SUCCINATE 50 MG/1
TABLET, EXTENDED RELEASE ORAL
Qty: 90 TABLET | Refills: 0 | Status: SHIPPED | OUTPATIENT
Start: 2018-06-25 | End: 2018-09-27

## 2018-06-25 NOTE — TELEPHONE ENCOUNTER
Prescription approved per [facility] [place].  Cheryl Garner RN  [facility]

## 2018-06-25 NOTE — TELEPHONE ENCOUNTER
"Requested Prescriptions   Pending Prescriptions Disp Refills     metoprolol succinate (TOPROL-XL) 50 MG 24 hr tablet [Pharmacy Med Name: METOPROLOL ER 50MG  TAB] 90 tablet 0        Last Written Prescription Date:  3.23.18  Last Fill Quantity: 90,  # refills: 0   Last Office Visit: 6/13/2018   Future Office Visit:    Next 5 appointments (look out 90 days)     Sep 19, 2018  4:00 PM CDT   SHORT with Julius Hassan MD   [facility] ([facility])    [address] [place] [id number]   [phone]                  Sig: TAKE 1 TABLET BY MOUTH ONCE DAILY    Beta-Blockers Protocol Passed    6/25/2018 10:12 AM       Passed - Blood pressure under 140/90 in past 12 months    BP Readings from Last 3 Encounters:   06/13/18 124/64   02/12/18 134/72   11/01/17 110/62                Passed - Patient is age 6 or older       Passed - Recent (12 mo) or future (30 days) visit within the authorizing provider's specialty    Patient had office visit in the last 12 months or has a visit in the next 30 days with authorizing provider or within the authorizing provider's specialty.  See \"Patient Info\" tab in inbasket, or \"Choose Columns\" in Meds & Orders section of the refill encounter.              "

## 2018-06-26 LAB — PAIN DRUG SCR UR W RPTD MEDS: NORMAL

## 2018-07-04 ENCOUNTER — MYC MEDICAL ADVICE (OUTPATIENT)
Dept: FAMILY MEDICINE | Facility: CLINIC | Age: 53
End: 2018-07-04

## 2018-07-04 ENCOUNTER — TELEPHONE (OUTPATIENT)
Dept: FAMILY MEDICINE | Facility: CLINIC | Age: 53
End: 2018-07-04

## 2018-07-04 DIAGNOSIS — G89.4 CHRONIC PAIN SYNDROME: Chronic | ICD-10-CM

## 2018-07-04 DIAGNOSIS — N30.01 ACUTE CYSTITIS WITH HEMATURIA: Primary | ICD-10-CM

## 2018-07-04 DIAGNOSIS — G35 MS (MULTIPLE SCLEROSIS) (H): ICD-10-CM

## 2018-07-04 PROBLEM — C85.90 T-CELL LYMPHOMA (H): Status: ACTIVE | Noted: 2017-10-01

## 2018-07-04 RX ORDER — NITROFURANTOIN 25; 75 MG/1; MG/1
100 CAPSULE ORAL 2 TIMES DAILY
Qty: 20 CAPSULE | Refills: 0 | Status: SHIPPED | OUTPATIENT
Start: 2018-07-04 | End: 2018-07-16

## 2018-07-05 RX ORDER — OXYCODONE HYDROCHLORIDE 15 MG/1
7.5-15 TABLET ORAL EVERY 4 HOURS PRN
Qty: 120 TABLET | Refills: 0 | Status: SHIPPED | OUTPATIENT
Start: 2018-07-07 | End: 2018-08-02

## 2018-07-05 NOTE — TELEPHONE ENCOUNTER
Controlled Substance Refill Request for Oxycodone  Problem List Complete:  Yes       Disp Refills Start End AISHA   oxyCODONE IR (ROXICODONE) 15 MG tablet 120 tablet 0 6/8/2018  No   Sig - Route: Take 0.5-1 tablets (7.5-15 mg) by mouth every 4 hours as needed for moderate to severe pain Limit 4 tablet(s) per day. - Oral   Class: Local Print   Order: [id number]     Clinic visit frequency required: none noted     Future Office visit:   Next 5 appointments (look out 90 days)     Sep 19, 2018  4:00 PM CDT   SHORT with Julius Hassan MD   [facility] ([facility])    [address] [place] [id number] [phone]                  Controlled substance agreement on file: Yes:  Date 2/12/2018.     Processing:  Patient will  in clinic   checked in past 3 months?  Yes pulled today, 7/5/2018   Routing refill request to provider for review/approval because:  Drug not on the [facility] refill protocol   Cheryl Garner RN  [facility]

## 2018-07-09 ENCOUNTER — TELEPHONE (OUTPATIENT)
Dept: BONE DENSITY | Facility: CLINIC | Age: 53
End: 2018-07-09

## 2018-07-09 ENCOUNTER — HOSPITAL ENCOUNTER (OUTPATIENT)
Dept: MAMMOGRAPHY | Facility: CLINIC | Age: 53
Discharge: HOME | End: 2018-07-09
Attending: FAMILY MEDICINE | Admitting: FAMILY MEDICINE
Payer: MEDICARE

## 2018-07-09 DIAGNOSIS — Z12.39 SCREENING FOR BREAST CANCER: ICD-10-CM

## 2018-07-09 PROCEDURE — 77063 BREAST TOMOSYNTHESIS BI: CPT

## 2018-07-12 DIAGNOSIS — N63.0 BREAST NODULE: Primary | ICD-10-CM

## 2018-07-13 ENCOUNTER — TELEPHONE (OUTPATIENT)
Dept: FAMILY MEDICINE | Facility: CLINIC | Age: 53
End: 2018-07-13

## 2018-07-13 NOTE — TELEPHONE ENCOUNTER
Reason for Call:  Other returning call    Detailed comments: The patient is returning a call left for her by the nurse.    Phone Number Patient can be reached at: Home number on file [phone] (home)    Best Time: Anytime    Can we leave a detailed message on this number? YES    Call taken on 7/13/2018 at 12:50 PM by Tiffany Wood

## 2018-07-16 ENCOUNTER — OFFICE VISIT (OUTPATIENT)
Dept: FAMILY MEDICINE | Facility: CLINIC | Age: 53
End: 2018-07-16
Payer: COMMERCIAL

## 2018-07-16 VITALS
TEMPERATURE: 98.6 F | HEART RATE: 86 BPM | SYSTOLIC BLOOD PRESSURE: 124 MMHG | OXYGEN SATURATION: 93 % | WEIGHT: 260 LBS | BODY MASS INDEX: 38.51 KG/M2 | DIASTOLIC BLOOD PRESSURE: 70 MMHG | HEIGHT: 69 IN

## 2018-07-16 DIAGNOSIS — G89.4 CHRONIC PAIN SYNDROME: Chronic | ICD-10-CM

## 2018-07-16 DIAGNOSIS — C85.90 T-CELL LYMPHOMA (H): ICD-10-CM

## 2018-07-16 DIAGNOSIS — M48.061 SPINAL STENOSIS OF LUMBAR REGION, UNSPECIFIED WHETHER NEUROGENIC CLAUDICATION PRESENT: ICD-10-CM

## 2018-07-16 DIAGNOSIS — E11.22 TYPE 2 DIABETES MELLITUS WITH STAGE 1 CHRONIC KIDNEY DISEASE, WITHOUT LONG-TERM CURRENT USE OF INSULIN (H): Primary | ICD-10-CM

## 2018-07-16 DIAGNOSIS — D72.829 LEUKOCYTOSIS, UNSPECIFIED TYPE: ICD-10-CM

## 2018-07-16 DIAGNOSIS — N18.1 CKD (CHRONIC KIDNEY DISEASE) STAGE 1, GFR 90 ML/MIN OR GREATER: ICD-10-CM

## 2018-07-16 DIAGNOSIS — N18.1 TYPE 2 DIABETES MELLITUS WITH STAGE 1 CHRONIC KIDNEY DISEASE, WITHOUT LONG-TERM CURRENT USE OF INSULIN (H): Primary | ICD-10-CM

## 2018-07-16 DIAGNOSIS — F32.A MODERATE DEPRESSIVE EPISODE: ICD-10-CM

## 2018-07-16 DIAGNOSIS — G35 MS (MULTIPLE SCLEROSIS) (H): ICD-10-CM

## 2018-07-16 DIAGNOSIS — M54.16 LUMBAR RADICULOPATHY: ICD-10-CM

## 2018-07-16 DIAGNOSIS — E78.5 HYPERLIPIDEMIA LDL GOAL <70: ICD-10-CM

## 2018-07-16 DIAGNOSIS — C85.81 OTHER SPECIFIED TYPE OF NON-HODGKIN LYMPHOMA OF HEAD (H): ICD-10-CM

## 2018-07-16 DIAGNOSIS — E66.01 MORBID OBESITY (H): ICD-10-CM

## 2018-07-16 DIAGNOSIS — I10 HYPERTENSION GOAL BP (BLOOD PRESSURE) < 140/90: ICD-10-CM

## 2018-07-16 DIAGNOSIS — Z51.81 MEDICATION MONITORING ENCOUNTER: ICD-10-CM

## 2018-07-16 PROCEDURE — 99214 OFFICE O/P EST MOD 30 MIN: CPT | Performed by: FAMILY MEDICINE

## 2018-07-16 RX ORDER — ATORVASTATIN CALCIUM 10 MG/1
10 TABLET, FILM COATED ORAL DAILY
Qty: 90 TABLET | Refills: 3 | Status: SHIPPED | OUTPATIENT
Start: 2018-07-16 | End: 2019-10-14

## 2018-07-16 ASSESSMENT — ANXIETY QUESTIONNAIRES
GAD7 TOTAL SCORE: 0
1. FEELING NERVOUS, ANXIOUS, OR ON EDGE: NOT AT ALL
2. NOT BEING ABLE TO STOP OR CONTROL WORRYING: NOT AT ALL
3. WORRYING TOO MUCH ABOUT DIFFERENT THINGS: NOT AT ALL
7. FEELING AFRAID AS IF SOMETHING AWFUL MIGHT HAPPEN: NOT AT ALL
IF YOU CHECKED OFF ANY PROBLEMS ON THIS QUESTIONNAIRE, HOW DIFFICULT HAVE THESE PROBLEMS MADE IT FOR YOU TO DO YOUR WORK, TAKE CARE OF THINGS AT HOME, OR GET ALONG WITH OTHER PEOPLE: SOMEWHAT DIFFICULT
6. BECOMING EASILY ANNOYED OR IRRITABLE: NOT AT ALL
5. BEING SO RESTLESS THAT IT IS HARD TO SIT STILL: NOT AT ALL

## 2018-07-16 ASSESSMENT — PATIENT HEALTH QUESTIONNAIRE - PHQ9: 5. POOR APPETITE OR OVEREATING: NOT AT ALL

## 2018-07-16 NOTE — PROGRESS NOTES
SUBJECTIVE:   Amanda Richardson is a 53 year old female who presents to clinic today for the following health issues:    Diabetes - new diagnosis      Patient is checking blood sugars: not at all    Diabetic concerns: New diabetic     Symptoms of hypoglycemia (low blood sugar): not sure - symptoms are similar to MS symptoms     Paresthesias (numbness or burning in feet) or sores: No     Date of last diabetic eye exam: 2+ years ago    Lab Results   Component Value Date    A1C 6.8 06/20/2018    A1C 6.5 10/02/2017    A1C 6.6 05/02/2016     Diabetes Management Resources    Hypertension Follow-up      Outpatient blood pressures are not being checked.    Low Salt Diet: no added salt    BP Readings from Last 3 Encounters:   07/16/18 124/70   06/13/18 124/64   02/12/18 134/72     Creatinine   Date Value Ref Range Status   06/20/2018 0.52 0.52 - 1.04 mg/dL Final     Lipids    Recent Labs   Lab Test  06/20/18   1529  05/02/16   1613  12/08/14   1756  11/13/12   0957   CHOL  141  169  172  205*   HDL  33*  35*  36*  32*   LDL  82  107*  97  140*   TRIG  130  135  197*  167*   CHOLHDLRATIO   --    --   4.8  6.5*     All below, stable    Proteinuria    Recent  UTI    MS    Lumbar stenosis    NHL    Elevated WBC - long term    Depression/Anxiety - phq= 7 and delano = 0    Problem list and histories reviewed & adjusted, as indicated.  Additional history: as documented    Reviewed and updated as needed this visit by clinical staff  Tobacco  Allergies  Meds  Med Hx  Surg Hx  Fam Hx  Soc Hx      Reviewed and updated as needed this visit by Provider       body mass index is 38.96 kg/(m^2).    Wt Readings from Last 4 Encounters:   07/16/18 260 lb (117.9 kg)   06/13/18 260 lb (117.9 kg)   02/12/18 260 lb (117.9 kg)   11/01/17 250 lb (113.4 kg)       Health Maintenance    Health Maintenance Due   Topic Date Due     FOOT EXAM Q1 YEAR  05/08/1964     EYE EXAM Q1 YEAR  05/08/1964     FIT Q1 YR  05/08/1973     WELLNESS VISIT Q1 YR   05/02/2017     DEXA Q5 YR  06/26/2017       Current Problem List    Patient Active Problem List   Diagnosis     MS (multiple sclerosis) (H)     Non Hodgkin's lymphoma (H)     Leukocytosis     Gallstone     Hypertension goal BP (blood pressure) < 140/90     Spinal stenosis, lumbar     Abnormality of gait     Pain medication agreement- signed Nov 20,2012     Lumbar radiculopathy     Colon polyps     Neuralgia, neuritis, and radiculitis, unspecified     Encounter for long-term current use of medication     [facility] Home     Moderate depressive episode (H)     Leg muscle spasm     Chronic pain syndrome     Controlled substance agreement signed     Severe obesity with body mass index (BMI) of 35.0 to 35.9 and comorbidity (H)     T-cell lymphoma (H)     Tobacco abuse     Type 2 diabetes, HbA1c goal < 7% (H)     Hyperlipidemia LDL goal <70     CKD (chronic kidney disease) stage 1, GFR 90 ml/min or greater       Past Medical History    Past Medical History:   Diagnosis Date     Abnormality of gait 7/27/2012     Chronic pain     [facility] - yearly, next in summer 2018     CKD (chronic kidney disease) stage 1, GFR 90 ml/min or greater      Colon polyps 1/15    tubular adenomas x 2     Gallstone 6/11/2012     Hyperlipidemia LDL goal <70      Hypertension goal BP (blood pressure) < 140/90      Leukocytosis 6/11/2012     Moderate depressive episode (H)      MS (multiple sclerosis) (H) 2003    Dr Vigil - [facility]     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     Tobacco abuse 06/11/2012    former     Type 2 diabetes, HbA1c goal < 7% (H)        Past Surgical History    Past Surgical History:   Procedure Laterality Date     COLONOSCOPY N/A 1/7/2015    tubular adenomas x 2 - due 5 yrs     FUSION LUMBAR ANTERIOR, FUSION LUMBAR POSTERIOR TWO LEVELS, COMBINED  10/17/2013    lumbar fusion - Dr Floyd     INSERT PUMP BACLOFEN  04/2017    intrathecal baclofen pump implantation     IRRIGATION AND DEBRIDEMENT LOWER EXTREMITY, COMBINED Right 2015    Right ankle I&D d/t infection     OPEN REDUCTION INTERNAL FIXATION ANKLE  5/15    Right Bimalleolar ankle fx ORIF     OPEN REDUCTION INTERNAL FIXATION ANKLE Right 11/2015    Revision due to spasms pulling screws out of ankle     SINUS SURGERY  2011    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     XR LUMBAR EPIDURAL INJECTION INCL IMAGING  3/14    Left L4-5 Epidural Dr Winter       Current Medications    Current Outpatient Prescriptions   Medication Sig Dispense Refill     amitriptyline (ELAVIL) 100 MG tablet Take 1 tablet (100 mg) by mouth At Bedtime 90 tablet 3     ASPIRIN PO Take 162 mg by mouth daily       atorvastatin (LIPITOR) 10 MG tablet Take 1 tablet (10 mg) by mouth daily 90 tablet 3     baclofen (LIORESAL) 10 MG tablet Take 1 tablet (10 mg) by mouth 3 times daily May take an additional 10 mg as needed qd for total of 40 mg per day 120 tablet 1     Cholecalciferol (VITAMIN D PO) Take 1 tablet by mouth daily 1000 IU daily       DULoxetine (CYMBALTA) 30 MG EC capsule Take 2 capsules (60 mg) by mouth 2 times daily 360 capsule 3     gabapentin (NEURONTIN) 600 MG tablet TAKE ONE TABLET BY MOUTH THREE TIMES DAILY AND MAY TAKE ONE ADDITIONAL AS NEEDED 270 tablet 1     losartan-hydrochlorothiazide (HYZAAR) 100-25 MG per tablet TAKE ONE TABLET BY MOUTH ONCE DAILY 90 tablet 3     metFORMIN (GLUCOPHAGE) 500 MG  tablet Take 1 tablet (500 mg) by mouth daily (with dinner) 90 tablet 3     metoprolol succinate (TOPROL-XL) 50 MG 24 hr tablet TAKE 1 TABLET BY MOUTH ONCE DAILY 90 tablet 0     Multiple Vitamin (MULTI-VITAMIN PO) Take 1 tablet by mouth daily        omega-3 fatty acids (FISH OIL) 1200 MG capsule Take 1 capsule by mouth daily.       oxyCODONE IR (ROXICODONE) 15 MG tablet Take 0.5-1 tablets (7.5-15 mg) by mouth every 4 hours as needed for moderate to severe pain Limit 4 tablet(s) per day. 120 tablet 0       Allergies    Allergies   Allergen Reactions     Lisinopril      Lip swelling     Cyclobenzaprine Other (See Comments)     Per 4-10-17 H&P by Yanna Dugan PA-C.     Flexeril [Cyclobenzaprine Hcl]      Got confused        Immunizations    Immunization History   Administered Date(s) Administered     Influenza (H1N1) 12/10/2009     Influenza (IIV3) PF 12/10/2009, 11/06/2012, 12/08/2014     Influenza Vaccine IM 3yrs+ 4 Valent IIV4 10/21/2013     Influenza Vaccine, 3 YRS +, IM (QUADRIVALENT W/PRESERVATIVES) 09/28/2017     Pneumococcal 23 valent 01/05/2011     Tdap (Adacel,Boostrix) 09/17/2008       Family History    Family History   Problem Relation Age of Onset     C.A.D. Father      with CHF      Cancer Father      ? unsure type - abdominal      Hypertension Mother      Thyroid Disease Mother      goiter      Breast Cancer Sister 58     Thyroid Disease Son      Cancer - colorectal No family hx of        Social History    Social History     Social History     Marital status:      Spouse name: Fernando     Number of children: 3     Years of education: 14     Occupational History      Unemployed     Social History Main Topics     Smoking status: Current Every Day Smoker     Packs/day: 0.50     Types: Cigarettes     Last attempt to quit: 8/4/2013     Smokeless tobacco: Never Used      Comment: since age 17     Alcohol use No     Drug use: No     Sexual activity: Yes     Partners: Male     Other Topics Concern      "Parent/Sibling W/ Cabg, Mi Or Angioplasty Before 65f 55m? No     Caffeine Concern Yes     occas     Exercise Yes     as able     Seat Belt Yes     Social History Narrative       All above reviewed and updated, all stable unless otherwise noted    Recent labs reviewed    ROS:  CONSTITUTIONAL: NEGATIVE for fever, chills, change in weight  INTEGUMENTARY/SKIN: NEGATIVE for worrisome rashes, moles or lesions  EYES: NEGATIVE for vision changes or irritation  ENT/MOUTH: NEGATIVE for ear, mouth and throat problems  RESP: NEGATIVE for significant cough or SOB  CV: NEGATIVE for chest pain, palpitations or peripheral edema  GI: NEGATIVE for nausea, abdominal pain, heartburn, or change in bowel habits  : NEGATIVE for frequency, dysuria, or hematuria  MUSCULOSKELETAL: NEGATIVE for significant arthralgias or myalgia  NEURO: NEGATIVE for weakness, dizziness or paresthesias  ENDOCRINE: NEGATIVE for temperature intolerance, skin/hair changes  HEME: NEGATIVE for bleeding problems  PSYCHIATRIC: NEGATIVE for changes in mood or affect    OBJECTIVE:                                                    /70  Pulse 86  Temp 98.6  F (37  C) (Oral)  Ht 5' 8.5\" (1.74 m)  Wt 260 lb (117.9 kg)  LMP 12/11/2011  SpO2 93%  BMI 38.96 kg/m2  Body mass index is 38.96 kg/(m^2).  GENERAL: healthy, alert and no distress  EYES: Eyes grossly normal to inspection, extraocular movements - intact, and PERRL  HENT: ear canals- normal; TMs- normal; Nose- normal; Mouth- no ulcers, no lesions  NECK: no tenderness, no adenopathy, no asymmetry, no masses, no stiffness; thyroid- normal to palpation  RESP: lungs clear to auscultation - no rales, no rhonchi, no wheezes  CV: regular rates and rhythm, normal S1 S2, no S3 or S4 and no murmur, no click or rub -  ABDOMEN: soft, no tenderness, no  hepatosplenomegaly, no masses, normal bowel sounds  MS: extremities- no gross deformities noted, no edema  SKIN: no suspicious lesions, no rashes  NEURO: strength and " tone- normal, sensory exam- grossly normal, mentation- intact, speech- normal, reflexes- symmetric  BACK: no CVA tenderness, no paralumbar tenderness  PSYCH: Alert and oriented times 3; speech- coherent , normal rate and volume; able to articulate logical thoughts, able to abstract reason, no tangential thoughts, no hallucinations or delusions, affect- normal    DIAGNOSTICS/PROCEDURES:                                                      Reviewed recent labs     ASSESSMENT/PLAN:                                                        ICD-10-CM    1. Type 2 diabetes mellitus with stage 1 chronic kidney disease, without long-term current use of insulin (H) E11.22 metFORMIN (GLUCOPHAGE) 500 MG tablet    N18.1 DIABETES EDUCATOR REFERRAL   2. CKD (chronic kidney disease) stage 1, GFR 90 ml/min or greater N18.1    3. Hypertension goal BP (blood pressure) < 140/90 I10    4. Hyperlipidemia LDL goal <70 E78.5 atorvastatin (LIPITOR) 10 MG tablet   5. 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 T-cell lymphoma (H) C85.90    7. Leukocytosis, unspecified type D72.829    8. MS (multiple sclerosis) (H) G35    9. Spinal stenosis of lumbar region, unspecified whether neurogenic claudication present M48.061    10. Lumbar radiculopathy M54.16    11. Chronic pain syndrome G89.4    12. Moderate depressive episode (H) F32.1    13. Morbid obesity (H) E66.01    14. Medication monitoring encounter Z51.81        Discussed treatment/modality options, including risk and benefits, she desires CDE, metformin, lipitor, continue current cares. All diagnosis above reviewed and noted above, otherwise stable.  [facility] for further details.  Follow up in 1-2 month(s) and as needed.    Return in about 6 weeks (around 8/27/2018), or if symptoms worsen or fail to improve, for Routine Visit.    Health Maintenance Due   Topic Date Due     FOOT EXAM Q1 YEAR  05/08/1966     EYE EXAM Q1 YEAR  05/08/1966     FIT Q1 YR   05/08/1973     WELLNESS VISIT Q1 YR  05/02/2017     DEXA Q5 YR  06/26/2017       See Patient Instructions           Julius Hassan MD [address]  [id number] [phone] Fax

## 2018-07-16 NOTE — PATIENT INSTRUCTIONS
[facility]                        To reach your care team during and after hours:   [phone]  To reach our pharmacy:        [phone]    Clinic Hours                        Our clinic hours are:    Monday   7:30 am to 7:00 pm                  Tuesday through Friday 7:30 am to 5:00 pm                             Saturday   8:00 am to 12:00 pm      Sunday   Closed      Pharmacy Hours                        Our pharmacy hours are:    Monday   8:30 am to 7:00 pm       Tuesday to Friday  8:30 am to 6:00 pm                       Saturday    9:00 am to 1:00 pm              Sunday    Closed              There is also information available at our web site:  [email or web address]    If your provider ordered any lab tests and you do not receive the results within 10 business days, please call the clinic.    If you need a medication refill please contact your pharmacy.  Please allow 2-3 business days for your refill to be completed.    Our clinic offers telephone visits and e visits.  Please ask one of your team members to explain more.      Use [email or web address] (secure email communication and access to your chart) to send your primary care provider a message or make an appointment. Ask someone on your Team how to sign up for [employer].  Immunizations                      Immunization History   Administered Date(s) Administered     Influenza (H1N1) 12/10/2009     Influenza (IIV3) PF 12/10/2009, 11/06/2012, 12/08/2014     Influenza Vaccine IM 3yrs+ 4 Valent IIV4 10/21/2013     Influenza Vaccine, 3 YRS +, IM (QUADRIVALENT W/PRESERVATIVES) 09/28/2017     Pneumococcal 23 valent 01/05/2011     Tdap (Adacel,Boostrix) 09/17/2008        Health Maintenance                         Health Maintenance Due   Topic Date Due     Diabetic Foot Exam - yearly  05/08/1964     Eye Exam - yearly  05/08/1964     Colon Cancer Screening - FIT Test - yearly  05/08/1973     Depression Action Plan Review - yearly  05/08/1981     Wellness  Visit with your Primary Provider - yearly  05/02/2017     Osteoporosis Screening (Dexa) - every 5 years   06/26/2017     Depression Assessment - every 6 months  08/12/2018

## 2018-07-16 NOTE — MR AVS SNAPSHOT
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)        T-cell lymphoma (H)        Leukocytosis, unspecified type        MS (multiple sclerosis) (H)        Spinal stenosis of lumbar region, unspecified whether neurogenic claudication present        Lumbar radiculopathy        Chronic pain syndrome        Moderate depressive episode (H)        Morbid obesity (H)        Medication monitoring encounter          Care Instructions        [facility]                        To reach your care team during and after hours:   [phone]  To reach our pharmacy:        [phone]    Clinic Hours                        Our clinic hours are:    Monday   7:30 am to 7:00 pm                  Tuesday through Friday 7:30 am to 5:00 pm                             Saturday   8:00 am to 12:00 pm      Sunday   Closed      Pharmacy Hours                        Our pharmacy hours are:    Monday   8:30 am to 7:00 pm       Tuesday to Friday  8:30 am to 6:00 pm                       Saturday    9:00 am to 1:00 pm              Sunday    Closed              There is also information available at our web site:  [email or web address]    If your provider ordered any lab tests and you do not receive the results within 10 business days, please call the clinic.    If you need a medication refill please contact your pharmacy.  Please allow 2-3 business days for  your refill to be completed.    Our clinic offers telephone visits and e visits.  Please ask one of your team members to explain more.      Use [employer] (secure email communication and access to your chart) to send your primary care provider a message or make an appointment. Ask someone on your Team how to sign up for [employer].  Immunizations                      Immunization History   Administered Date(s) Administered     Influenza (H1N1) 12/10/2009     Influenza (IIV3) PF 12/10/2009, 11/06/2012, 12/08/2014     Influenza Vaccine IM 3yrs+ 4 Valent IIV4 10/21/2013     Influenza Vaccine, 3 YRS +, IM (QUADRIVALENT W/PRESERVATIVES) 09/28/2017     Pneumococcal 23 valent 01/05/2011     Tdap (Adacel,Boostrix) 09/17/2008        Health Maintenance                         Health Maintenance Due   Topic Date Due     Diabetic Foot Exam - yearly  05/08/1966     Eye Exam - yearly  05/08/1964     Colon Cancer Screening - FIT Test - yearly  05/08/1973     Depression Action Plan Review - yearly  05/08/1981     Wellness Visit with your Primary Provider - yearly  05/02/2017     Osteoporosis Screening (Dexa) - every 5 years   06/26/2017     Depression Assessment - every 6 months  08/12/2018               Follow-ups after your visit        Additional Services     DIABETES EDUCATOR REFERRAL       DIABETES SELF MANAGEMENT TRAINING (DSMT)      Your provider has referred you to Diabetes Education: FMG: Diabetes Education - All [facility] [phone]   [email or web address]     If an urgent visit is needed or A1C is above 12, Care Team to call the Diabetes  Education Team at [phone] or send an In Basket message to the Diabetes Education Pool (P DIAB ED-PATIENT CARE).    A  will call you to make your appointment. If it has been more than 3 business days since your referral was placed, please call the above phone number to schedule.    Type of training and number of hours: New  Diagnosis: Initial group DSMT - 10 hours.      Diabetes Type: Type 2 - On Oral Medication   Medicare covers: 10 hours of initial DSMT in 12 month period from the time of first visit, plus 2 hours of follow-up DSMT annually, and additional hours as requested for insulin training.         Diabetes Co-Morbidities: dyslipidemia, hypertension, kidney disease and obesity               A1C Goal:  <7.0       A1C is: Lab Results       Component                Value               Date                       A1C                      6.8                 06/20/2018              MEDICAL NUTRITION THERAPY (MNT) for Diabetes    Medical Nutrition Therapy with a Registered Dietitian can be provided in coordination with Diabetes Self-Management Training to assist in achieving optimal diabetes management.    MNT Type and Hours: per CDE               Medicare will cover: 3 hours initial MNT in 12 month period after first visit, plus 2 hours of follow-up MNT annually        Diabetes Education Topics: Comprehensive Knowledge Assessment and Instruction    Special Educational Needs Requiring Individual DSMT: None      Please be aware that coverage of these services is subject to the terms and limitations of your health insurance plan.  Call member services at your health plan to determine Diabetes Self-Management Training (Codes  and ) and Medical Nutrition Therapy (Codes 65991 and 17842) benefits and ask which blood glucose monitor brands are covered by your plan.  Please bring the following with you to your appointment:    (1)  List of current medications   (2)  List of Blood Glucose Monitor brands that are covered by your insurance plan  (3)  Blood Glucose Monitor and log book  (4)   Food records for the 3 days prior to your visit    The Certified Diabetes Educator may make diabetes medication adjustments per the CDE Protocol and Collaborative Practice Agreement.                  Follow-up notes from your care team     Return in  about 6 weeks (around 8/27/2018), or if symptoms worsen or fail to improve, for Routine Visit.      Your next 10 appointments already scheduled     Jul 23, 2018  3:30 PM CDT   US BREAST LEFT LIMITED 1-3 QUAD with [id number]   [facility] ([facility])    [address]  [facility] [id number] [phone]           Please bring a list of your medicines (including vitamins, minerals and over-the-counter drugs). Also, tell your doctor about any allergies you may have. Wear comfortable clothes and leave your valuables at home.  You do not need to do anything special to prepare for your exam.  Please call the Imaging Department at your exam site with any questions.            Sep 19, 2018  4:00 PM CDT   [facility]   [facility]    [address] [place] [id number] [phone]              Who to contact     If you have questions or need follow up information about today's clinic visit or your schedule please contact [facility] directly at [phone].  Normal or non-critical lab and imaging results will be communicated to you by MyChart, letter or phone within 4 business days after the clinic has received the results. If you do not hear from us within 7 days, please contact the clinic through MyChart or phone. If you have a critical or abnormal lab result, we will notify you by phone as soon as possible.  Submit refill requests through [employer] or call your pharmacy and they will forward the refill request to us. Please allow 3 business days for your refill to be completed.          Additional Information About Your Visit        [employer] Information     [employer] gives you secure access to your electronic health record. If you see a primary care provider, you can also send messages to your care team and make appointments. If you have questions, please call your primary  "care clinic.  If you do not have a primary care provider, please call [phone] and they will assist you.        Care EveryWhere ID     This is your Care EveryWhere ID. This could be used by other organizations to access your [facility] medical records  [id number]        Your Vitals Were     Pulse Temperature Height Last Period Pulse Oximetry BMI (Body Mass Index)    86 98.6  F (37  C) (Oral) 5' 8.5\" (1.74 m) 12/11/2011 93% 38.96 kg/m2       Blood Pressure from Last 3 Encounters:   07/16/18 124/70   06/13/18 124/64   02/12/18 134/72    Weight from Last 3 Encounters:   07/16/18 260 lb (117.9 kg)   06/13/18 260 lb (117.9 kg)   02/12/18 260 lb (117.9 kg)              We Performed the Following     DEPRESSION ACTION PLAN (DAP)     DIABETES EDUCATOR REFERRAL          Today's Medication Changes          These changes are accurate as of 7/16/18  4:57 PM.  If you have any questions, ask your nurse or doctor.               Start taking these medicines.        Dose/Directions    atorvastatin 10 MG tablet   Commonly known as:  LIPITOR   Used for:  Hyperlipidemia LDL goal <70   Started by:  Julius Hassan MD        Dose:  10 mg   Take 1 tablet (10 mg) by mouth daily   Quantity:  90 tablet   Refills:  3       metFORMIN 500 MG tablet   Commonly known as:  GLUCOPHAGE   Used for:  Type 2 diabetes mellitus with stage 1 chronic kidney disease, without long-term current use of insulin (H)   Started by:  Julius Hassan MD        Dose:  500 mg   Take 1 tablet (500 mg) by mouth daily (with dinner)   Quantity:  90 tablet   Refills:  3            Where to get your medicines      [id number] [place] - [address] AT [facility]  [address] [address]     Phone:  [phone]     atorvastatin 10 MG tablet    metFORMIN 500 MG tablet                Primary Care Provider Office Phone # Fax #    Julius Hassan [phone]       [address] [place] [address]" [facility] [address]        Goals        General    I will continue to work with home care until discharge goals are met. (pt-stated)     Notes - Note edited  10/28/2015  3:09 PM by Cordelia Moreland RN    As of today's date 9/22/2015 goal is met at 76 - 100%.   Goal Status:  Ongoing  As of today's date 10/28/2015 goal is met at 76 - 100%.   Goal Status:  Complete            Equal Access to Services     JUAN AYALA : Hadii aad ku hadasho Soomaali, waaxda luqadaha, qaybta kaalmada adeegyada, waxay idiin hayaan adeeg khrangelsh lami . So [facility] [phone].    ATENCIÓN: Si habla español, tiene a woods disposición servicios gratuitos de asistencia lingüística. Llame al [phone].    We comply with applicable federal civil rights laws and [place] laws. We do not discriminate on the basis of race, color, national origin, age, disability, sex, sexual orientation, or gender identity.            Thank you!     Thank you for choosing [facility]  for your care. Our goal is always to provide you with excellent care. Hearing back from our patients is one way we can continue to improve our services. Please take a few minutes to complete the written survey that you may receive in the mail after your visit with us. Thank you!             Your Updated Medication List - Protect others around you: Learn how to safely use, store and throw away your medicines at www.disposemymeds.org.          This list is accurate as of 7/16/18  4:57 PM.  Always use your most recent med list.                   Brand Name Dispense Instructions for use Diagnosis    amitriptyline 100 MG tablet    ELAVIL    90 tablet    Take 1 tablet (100 mg) by mouth At Bedtime    Lumbar radiculopathy, Chronic pain syndrome, MS (multiple sclerosis) (H), Moderate depressive episode (H)       ASPIRIN PO      Take 162 mg by mouth daily        atorvastatin 10 MG tablet    LIPITOR    90 tablet    Take 1 tablet (10 mg) by mouth daily    Hyperlipidemia  LDL goal <70       baclofen 10 MG tablet    LIORESAL    120 tablet    Take 1 tablet (10 mg) by mouth 3 times daily May take an additional 10 mg as needed qd for total of 40 mg per day    Chronic pain of both lower extremities       DULoxetine 30 MG EC capsule    CYMBALTA    360 capsule    Take 2 capsules (60 mg) by mouth 2 times daily    Lumbar radiculopathy, MS (multiple sclerosis) (H), Chronic pain syndrome, Moderate depressive episode (H)       gabapentin 600 MG tablet    NEURONTIN    270 tablet    TAKE ONE TABLET BY MOUTH THREE TIMES DAILY AND MAY TAKE ONE ADDITIONAL AS NEEDED    Chronic pain of both lower extremities       losartan-hydrochlorothiazide 100-25 MG per tablet    HYZAAR    90 tablet    TAKE ONE TABLET BY MOUTH ONCE DAILY    Hypertension goal BP (blood pressure) < 140/90       metFORMIN 500 MG tablet    GLUCOPHAGE    90 tablet    Take 1 tablet (500 mg) by mouth daily (with dinner)    Type 2 diabetes mellitus with stage 1 chronic kidney disease, without long-term current use of insulin (H)       metoprolol succinate 50 MG 24 hr tablet    TOPROL-XL    90 tablet    TAKE 1 TABLET BY MOUTH ONCE DAILY    Hypertension goal BP (blood pressure) < 140/90       MULTI-VITAMIN PO      Take 1 tablet by mouth daily        omega-3 fatty acids 1200 MG capsule      Take 1 capsule by mouth daily.        oxyCODONE IR 15 MG tablet    ROXICODONE    120 tablet    Take 0.5-1 tablets (7.5-15 mg) by mouth every 4 hours as needed for moderate to severe pain Limit 4 tablet(s) per day.    MS (multiple sclerosis) (H), Chronic pain syndrome       VITAMIN D PO      Take 1 tablet by mouth daily 1000 IU daily

## 2018-07-16 NOTE — LETTER
My Depression Action Plan  Name: Amanda Richardson   Date of Birth 1965  Date: 7/16/2018    My doctor: Julius Hassan   My clinic: [address] [id number] [phone]          GREEN    ZONE   Good Control    What it looks like:     Things are going generally well. You have normal up s and down s. You may even feel depressed from time to time, but bad moods usually last less than a day.   What you need to do:  1. Continue to care for yourself (see self care plan)  2. Check your depression survival kit and update it as needed  3. Follow your physician s recommendations including any medication.  4. Do not stop taking medication unless you consult with your physician first.           YELLOW         ZONE Getting Worse    What it looks like:     Depression is starting to interfere with your life.     It may be hard to get out of bed; you may be starting to isolate yourself from others.    Symptoms of depression are starting to last most all day and this has happened for several days.     You may have suicidal thoughts but they are not constant.   What you need to do:     1. Call your care team, your response to treatment will improve if you keep your care team informed of your progress. Yellow periods are signs an adjustment may need to be made.     2. Continue your self-care, even if you have to fake it!    3. Talk to someone in your support network    4. Open up your depression survival kit           RED    ZONE Medical Alert - Get Help    What it looks like:     Depression is seriously interfering with your life.     You may experience these or other symptoms: You can t get out of bed most days, can t work or engage in other necessary activities, you have trouble taking care of basic hygiene, or basic responsibilities, thoughts of suicide or death that will not go away, self-injurious behavior.     What you need to do:  1. Call your care team  and request a same-day appointment. If they are not available (weekends or after hours) call your local crisis line, emergency room or 911.            Depression Self Care Plan / Survival Kit    Self-Care for Depression  Here s the deal. Your body and mind are really not as separate as most people think.  What you do and think affects how you feel and how you feel influences what you do and think. This means if you do things that people who feel good do, it will help you feel better.  Sometimes this is all it takes.  There is also a place for medication and therapy depending on how severe your depression is, so be sure to consult with your medical provider and/ or Behavioral Health Consultant if your symptoms are worsening or not improving.     In order to better manage my stress, I will:    Exercise  Get some form of exercise, every day. This will help reduce pain and release endorphins, the  feel good  chemicals in your brain. This is almost as good as taking antidepressants!  This is not the same as joining a gym and then never going! (they count on that by the way ) It can be as simple as just going for a walk or doing some gardening, anything that will get you moving.      Hygiene   Maintain good hygiene (Get out of bed in the morning, Make your bed, Brush your teeth, Take a shower, and Get dressed like you were going to work, even if you are unemployed).  If your clothes don't fit try to get ones that do.    Diet  I will strive to eat foods that are good for me, drink plenty of water, and avoid excessive sugar, caffeine, alcohol, and other mood-altering substances.  Some foods that are helpful in depression are: complex carbohydrates, B vitamins, flaxseed, fish or fish oil, fresh fruits and vegetables.    Psychotherapy  I agree to participate in Individual Therapy (if recommended).    Medication  If prescribed medications, I agree to take them.  Missing doses can result in serious side effects.  I understand  that drinking alcohol, or other illicit drug use, may cause potential side effects.  I will not stop my medication abruptly without first discussing it with my provider.    Staying Connected With Others  I will stay in touch with my friends, family members, and my primary care provider/team.    Use your imagination  Be creative.  We all have a creative side; it doesn t matter if it s oil painting, sand castles, or mud pies! This will also kick up the endorphins.    Witness Beauty  (AKA stop and smell the roses) Take a look outside, even in mid-winter. Notice colors, textures. Watch the squirrels and birds.     Service to others  Be of service to others.  There is always someone else in need.  By helping others we can  get out of ourselves  and remember the really important things.  This also provides opportunities for practicing all the other parts of the program.    Humor  Laugh and be silly!  Adjust your TV habits for less news and crime-drama and more comedy.    Control your stress  Try breathing deep, massage therapy, biofeedback, and meditation. Find time to relax each day.     My support system    Clinic Contact:  Phone number:    Contact 1:  Phone number:    Contact 2:  Phone number:    Quaker/:  Phone number:    Therapist:  Phone number:    Local crisis center:    Phone number:    Other community support:  Phone number:

## 2018-07-17 ASSESSMENT — PATIENT HEALTH QUESTIONNAIRE - PHQ9: SUM OF ALL RESPONSES TO PHQ QUESTIONS 1-9: 7

## 2018-07-17 ASSESSMENT — ANXIETY QUESTIONNAIRES: GAD7 TOTAL SCORE: 0

## 2018-07-23 ENCOUNTER — HOSPITAL ENCOUNTER (OUTPATIENT)
Dept: ULTRASOUND IMAGING | Facility: CLINIC | Age: 53
Discharge: HOME | End: 2018-07-23
Attending: FAMILY MEDICINE | Admitting: FAMILY MEDICINE
Payer: MEDICARE

## 2018-07-23 DIAGNOSIS — R92.8 ABNORMAL MAMMOGRAM: ICD-10-CM

## 2018-07-23 PROCEDURE — 76642 ULTRASOUND BREAST LIMITED: CPT | Mod: LT

## 2018-07-26 ENCOUNTER — TELEPHONE (OUTPATIENT)
Dept: BONE DENSITY | Facility: CLINIC | Age: 53
End: 2018-07-26

## 2018-08-02 ENCOUNTER — MYC MEDICAL ADVICE (OUTPATIENT)
Dept: FAMILY MEDICINE | Facility: CLINIC | Age: 53
End: 2018-08-02

## 2018-08-02 DIAGNOSIS — G89.4 CHRONIC PAIN SYNDROME: Chronic | ICD-10-CM

## 2018-08-02 DIAGNOSIS — G35 MS (MULTIPLE SCLEROSIS) (H): ICD-10-CM

## 2018-08-02 RX ORDER — OXYCODONE HYDROCHLORIDE 15 MG/1
7.5-15 TABLET ORAL EVERY 4 HOURS PRN
Qty: 120 TABLET | Refills: 0 | Status: SHIPPED | OUTPATIENT
Start: 2018-08-02 | End: 2018-09-04

## 2018-08-02 NOTE — TELEPHONE ENCOUNTER
Medication Detail         Disp Refills Start End AISHA     oxyCODONE IR (ROXICODONE) 15 MG tablet 120 tablet 0 7/7/2018  No     Sig - Route: Take 0.5-1 tablets (7.5-15 mg) by mouth every 4 hours as needed for moderate to severe pain Limit 4 tablet(s) per day. - Oral     Problem List Complete:  Yes    Last Office Visit with [facility] primary care provider: 0716/2018    Clinic visit frequency required: Q 3 months     Future Office visit:   Next 5 appointments (look out 90 days)     Sep 19, 2018  4:00 PM CDT   SHORT with Julius Hassan MD   [facility] ([facility]    [address] [place] [id number] [phone]                  Controlled substance agreement on file: Yes:  Date 02/12/2018.     Processing:  Patient will  in clinic   Per [employer] Message, patient's spouse, Fernando, to  (CTC on file)    Routing refill request to provider for review/approval because:  Drug not on the [facility] refill protocol         Ivanna Kelly RN  Reading Triage

## 2018-08-03 NOTE — TELEPHONE ENCOUNTER
Routing to TS review.  Oxycodone script is dated 8/2/2018. Your note below states to fill on 8/7/2018.      Please advise.      LILLIANA Deng, RN, N  [facility] [phone]

## 2018-08-03 NOTE — TELEPHONE ENCOUNTER
Pt calling back. Aware of RX at . Pt is requesting to fill on 8/6  Because of 31 days in July.  Please call & let pt know. Call her on her home [phone] & OK to leave a detailed message.  Maame Ascencio, Clinic Receptionist

## 2018-08-04 ENCOUNTER — MYC MEDICAL ADVICE (OUTPATIENT)
Dept: FAMILY MEDICINE | Facility: CLINIC | Age: 53
End: 2018-08-04

## 2018-08-06 ENCOUNTER — TELEPHONE (OUTPATIENT)
Dept: FAMILY MEDICINE | Facility: CLINIC | Age: 53
End: 2018-08-06

## 2018-08-06 NOTE — TELEPHONE ENCOUNTER
Pt cannot get this filled early due to TS,MD already stated pt cannot fill until 8/7/2018     RN called # below     Advised pt on the above as with that TS,MD is out of the office and no other provider will go against what was already written about filling early     Patient stated an understanding and agreed with plan.    Cordelia Stevens RN, BSN  [facility]

## 2018-08-06 NOTE — TELEPHONE ENCOUNTER
Reason for Call:  Other prescription    Detailed comments: The patient is calling to ask if she can refill her oxycodone today. She would like a call back.    Phone Number Patient can be reached at: Home number on file [phone] (home)    Best Time: Anytime    Can we leave a detailed message on this number? YES    Call taken on 8/6/2018 at 2:41 PM by Tiffany Wood

## 2018-08-06 NOTE — TELEPHONE ENCOUNTER
I spoke with Amanda. Her  is coming to pick her Rx up today around 3:15. She needs to fill it today.  She ran out of oxy last night. See her [employer] message. She said the person who called  from Dr Hassan says to fill on the 7th. She is requesting to fill today. I see the Rx says start Aug 2nd so I am confused.    Routing to triage.     Princess Huff RN- Triage [employer]

## 2018-08-30 ENCOUNTER — ALLIED HEALTH/NURSE VISIT (OUTPATIENT)
Dept: EDUCATION SERVICES | Facility: CLINIC | Age: 53
End: 2018-08-30
Payer: COMMERCIAL

## 2018-08-30 DIAGNOSIS — E11.9 DIABETES MELLITUS WITHOUT COMPLICATION (H): Primary | ICD-10-CM

## 2018-08-30 PROCEDURE — G0108 DIAB MANAGE TRN  PER INDIV: HCPCS

## 2018-08-30 NOTE — MR AVS SNAPSHOT
After Visit Summary   8/30/2018    Amanda Richardson    MRN: [id number]           Patient Information     Date Of Birth          1965        Visit Information        Provider Department      8/30/2018 2:30 PM  DIABETIC ED RESOURCE [facility] [place]         Follow-ups after your visit        Your next 10 appointments already scheduled     Sep 19, 2018  4:00 PM CDT   SHORT with Julius Hassan MD   [facility] ([facility])    [address] [place] [id number] [phone]            Oct 04, 2018  2:30 PM CDT   Diabetes Education with  DIABETIC ED RESOURCE   [facility] [place] ([facility])    [address] [facility] [address]   [phone]              Who to contact     If you have questions or need follow up information about today's clinic visit or your schedule please contact [facility] directly at [phone].  Normal or non-critical lab and imaging results will be communicated to you by [facility], letter or phone within 4 business days after the clinic has received the results. If you do not hear from us within 7 days, please contact the clinic through [facility] or phone. If you have a critical or abnormal lab result, we will notify you by phone as soon as possible.  Submit refill requests through [facility] or call your pharmacy and they will forward the refill request to us. Please allow 3 business days for your refill to be completed.          Additional Information About Your Visit        [facility] Information     [facility]Now gives you secure access to your electronic health record. If you see a primary care provider, you can also send messages to your care team and make appointments. If you have questions, please call your primary care clinic.  If you do not have a primary care provider, please call [phone] and they will assist you.         Care EveryWhere ID     This is your Care EveryWhere ID. This could be used by other organizations to access your [facility] medical records  [id number]        Your Vitals Were     Last Period                   12/11/2011            Blood Pressure from Last 3 Encounters:   07/16/18 124/70   06/13/18 124/64   02/12/18 134/72    Weight from Last 3 Encounters:   07/16/18 117.9 kg (260 lb)   06/13/18 117.9 kg (260 lb)   02/12/18 117.9 kg (260 lb)              Today, you had the following     No orders found for display       Primary Care Provider Office Phone # Fax #    Julius Hassan [phone] [address] [facility] [address]        Goals        General    Healthy Eating (pt-stated)     Notes - Note created  8/30/2018  2:46 PM by Paula Angela RD    Goal Statement: I will eat 2-3 carbs (30-45 total grams carbohydrate) and 0-1 serving (0-15 total carbohydate) per snack    Measure of Success: food records at next visit    Strengths: desire to control diabetes  Ideas to overcome barriers: new system    Date to Achieve By: 10/30/18        I will continue to work with home care until discharge goals are met. (pt-stated)     Notes - Note edited  10/28/2015  3:09 PM by Cordelia Moreland RN    As of today's date 9/22/2015 goal is met at 76 - 100%.   Goal Status:  Ongoing  As of today's date 10/28/2015 goal is met at 76 - 100%.   Goal Status:  Complete            Equal Access to Services     [facility]: Hadii martha childress Sokelly, waaxda luqadaha, qaybta kaalmada stepan, waxcelia cherie valdez. So [facility] [phone].    ATENCIÓN: Si habla español, tiene a woods disposición servicios gratuitos de asistencia lingüística. Llame al [phone].    We comply with applicable federal civil rights laws and [place] laws. We do not discriminate on the basis of race, color, national origin, age, disability, sex, sexual orientation, or gender identity.            Thank you!      Thank you for choosing [facility]  for your care. Our goal is always to provide you with excellent care. Hearing back from our patients is one way we can continue to improve our services. Please take a few minutes to complete the written survey that you may receive in the mail after your visit with us. Thank you!             Your Updated Medication List - Protect others around you: Learn how to safely use, store and throw away your medicines at www.disposemymeds.org.          This list is accurate as of 8/30/18  3:10 PM.  Always use your most recent med list.                   Brand Name Dispense Instructions for use Diagnosis    amitriptyline 100 MG tablet    ELAVIL    90 tablet    Take 1 tablet (100 mg) by mouth At Bedtime    Lumbar radiculopathy, Chronic pain syndrome, MS (multiple sclerosis) (H), Moderate depressive episode (H)       ASPIRIN PO      Take 162 mg by mouth daily        atorvastatin 10 MG tablet    LIPITOR    90 tablet    Take 1 tablet (10 mg) by mouth daily    Hyperlipidemia LDL goal <70       baclofen 10 MG tablet    LIORESAL    120 tablet    Take 1 tablet (10 mg) by mouth 3 times daily May take an additional 10 mg as needed qd for total of 40 mg per day    Chronic pain of both lower extremities       DULoxetine 30 MG EC capsule    CYMBALTA    360 capsule    Take 2 capsules (60 mg) by mouth 2 times daily    Lumbar radiculopathy, MS (multiple sclerosis) (H), Chronic pain syndrome, Moderate depressive episode (H)       gabapentin 600 MG tablet    NEURONTIN    270 tablet    TAKE ONE TABLET BY MOUTH THREE TIMES DAILY AND MAY TAKE ONE ADDITIONAL AS NEEDED    Chronic pain of both lower extremities       losartan-hydrochlorothiazide 100-25 MG per tablet    HYZAAR    90 tablet    TAKE ONE TABLET BY MOUTH ONCE DAILY    Hypertension goal BP (blood pressure) < 140/90       metFORMIN 500 MG tablet    GLUCOPHAGE    90 tablet    Take 1 tablet (500 mg) by mouth daily (with dinner)     Type 2 diabetes mellitus with stage 1 chronic kidney disease, without long-term current use of insulin (H)       metoprolol succinate 50 MG 24 hr tablet    TOPROL-XL    90 tablet    TAKE 1 TABLET BY MOUTH ONCE DAILY    Hypertension goal BP (blood pressure) < 140/90       MULTI-VITAMIN PO      Take 1 tablet by mouth daily        omega-3 fatty acids 1200 MG capsule      Take 1 capsule by mouth daily.        oxyCODONE IR 15 MG tablet    ROXICODONE    120 tablet    Take 0.5-1 tablets (7.5-15 mg) by mouth every 4 hours as needed for moderate to severe pain Limit 4 tablet(s) per day.    MS (multiple sclerosis) (H), Chronic pain syndrome       VITAMIN D PO      Take 1 tablet by mouth daily 1000 IU daily

## 2018-08-30 NOTE — PROGRESS NOTES
"Diabetes Self-Management Education & Support    Diabetes Education Self Management & Training    SUBJECTIVE/OBJECTIVE:  Presents for: Initial Assessment for new diagnosis  Diabetes education in the past 24mo: No  Diabetes type: Type 2  Disease course: Stable  Cultural Influences/Ethnic Background:  American    Diabetes Symptoms & Complications  Weight trend: Stable       Patient Problem List and Family Medical History reviewed for relevant medical history, current medical status, and diabetes risk factors.    Vitals:  [facility] 12/11/2011  Estimated body mass index is 38.96 kg/(m^2) as calculated from the following:    Height as of 7/16/18: 1.74 m (5' 8.5\").    Weight as of 7/16/18: 117.9 kg (260 lb).   Last 3 BP:   BP Readings from Last 3 Encounters:   07/16/18 124/70   06/13/18 124/64   02/12/18 134/72       History   Smoking Status     Current Every Day Smoker     Packs/day: 0.50     Types: Cigarettes     Last attempt to quit: 8/4/2013   Smokeless Tobacco     Never Used     Comment: since age 17       Labs:  Lab Results   Component Value Date    A1C 6.8 06/20/2018     Lab Results   Component Value Date     06/20/2018     Lab Results   Component Value Date    LDL 82 06/20/2018     HDL Cholesterol   Date Value Ref Range Status   06/20/2018 33 (L) >49 mg/dL Final   ]  GFR Estimate   Date Value Ref Range Status   06/20/2018 >90 >60 mL/min/1.7m2 Final     Comment:     Non  GFR Calc     GFR Estimate If Black   Date Value Ref Range Status   06/20/2018 >90 >60 mL/min/1.7m2 Final     Comment:      GFR Calc     Lab Results   Component Value Date    CR 0.52 06/20/2018     No results found for: MICROALBUMIN    Healthy Eating  Cultural/Anabaptist diet restrictions?: No  Patient on a regular basis:  (2 meals a day, sometimes with grazing as well)  Meals include: Breakfast, Dinner, Snacks  Breakfast: breakfast sandwich ([place]), maybe a small glass of milk  Dinner: late - fish OR tacos OR " pasta OR steak/vegetable, sometimes with a starch -- water to drink, maybe diet coke   Snacks: 3pm - sandwich OR rice cakes with PB and banana OR piece of fruit   Beverages: Water, Diet soda, Tea, Milk  Has patient met with a dietitian in the past?: No    Being Active  Exercise:: Unable to exercise    Monitoring  Did patient bring glucose meter to appointment? : No    Taking Medications  Diabetes Medication(s)     Biguanides Sig    metFORMIN (GLUCOPHAGE) 500 MG tablet Take 1 tablet (500 mg) by mouth daily (with dinner)          Current Treatments: Oral Agent (monotherapy)    Problem Solving  Hypoglycemia Frequency: Never    Hypoglycemia Symptoms       Hypoglycemia Complications       Reducing Risks  Diabetes Risks: Age over 45 years  CAD Risks: Diabetes Mellitus, Tobacco exposure    Healthy Coping  Emotional response to diabetes: Ready to learn, Uncertain  Stage of change: PREPARATION (Decided to change - considering how)  Difficulty affording diabetes management supplies?: No  Patient Activation Measure Survey Score:  STELLA Score (Last Two) 11/20/2012   STELLA Raw Score 43   Activation Score 68.5   STELLA Level 4       ASSESSMENT:  Would like to lose weight, although is not sure how to best do it. Is limited in her activity (wheelchair bound), and also has chronic pain.     Goals        General    Healthy Eating (pt-stated)     Notes - Note created  8/30/2018  2:46 PM by Paula Angela RD    Goal Statement: I will eat 2-3 carbs (30-45 total grams carbohydrate) and 0-1 serving (0-15 total carbohydate) per snack    Measure of Success: food records at next visit    Strengths: desire to control diabetes  Ideas to overcome barriers: new system    Date to Achieve By: 10/30/18        I will continue to work with home care until discharge goals are met. (pt-stated)     Notes - Note edited  10/28/2015  3:09 PM by Cordelia Moreland RN    As of today's date 9/22/2015 goal is met at 76 - 100%.   Goal Status:  Ongoing  As of today's  date 10/28/2015 goal is met at 76 - 100%.   Goal Status:  Complete                Patient's most recent   Lab Results   Component Value Date    A1C 6.8 06/20/2018    is meeting goal of <7.0    INTERVENTION:   Diabetes knowledge and skills assessment:     Patient is knowledgeable in diabetes management concepts related to: Healthy Eating, Being Active and Taking Medication    Patient needs further education on the following diabetes management concepts: Healthy Eating, Monitoring, Problem Solving, Reducing Risks and Healthy Coping    Based on learning assessment above, most appropriate setting for further diabetes education would be: Group class or Individual setting.    Education provided today on:  AADE Self-Care Behaviors:  Healthy Eating: carbohydrate counting, consistency in amount, composition, and timing of food intake, weight reduction, portion control, plate planning method and label reading  Being Active: relationship to blood glucose  Healthy Coping: benefits of making appropriate lifestyle changes    Opportunities for ongoing education and support in diabetes-self management were discussed.    Pt verbalized understanding of concepts discussed and recommendations provided today.       Education Materials Provided:  [facility] Understanding Diabetes Booklet and My Plate Planner    PLAN:  See Patient Instructions for co-developed, patient-stated behavior change goals.  AVS printed and provided to patient today. See Follow-Up section for recommended follow-up.    JENNIFER Marks CDE  Time Spent: 60 minutes  Encounter Type: Individual    Any diabetes medication dose changes were made via the CDE Protocol and Collaborative Practice Agreement with the patient's referring provider. A copy of this encounter was shared with the provider.

## 2018-09-04 ENCOUNTER — MYC REFILL (OUTPATIENT)
Dept: FAMILY MEDICINE | Facility: CLINIC | Age: 53
End: 2018-09-04

## 2018-09-04 ENCOUNTER — MYC MEDICAL ADVICE (OUTPATIENT)
Dept: FAMILY MEDICINE | Facility: CLINIC | Age: 53
End: 2018-09-04

## 2018-09-04 DIAGNOSIS — G89.4 CHRONIC PAIN SYNDROME: Chronic | ICD-10-CM

## 2018-09-04 DIAGNOSIS — G35 MS (MULTIPLE SCLEROSIS) (H): ICD-10-CM

## 2018-09-04 RX ORDER — OXYCODONE HYDROCHLORIDE 15 MG/1
7.5-15 TABLET ORAL EVERY 4 HOURS PRN
Qty: 120 TABLET | Refills: 0 | Status: CANCELLED | OUTPATIENT
Start: 2018-09-04

## 2018-09-04 RX ORDER — OXYCODONE HYDROCHLORIDE 15 MG/1
7.5-15 TABLET ORAL EVERY 4 HOURS PRN
Qty: 120 TABLET | Refills: 0 | Status: SHIPPED | OUTPATIENT
Start: 2018-09-04 | End: 2018-09-19

## 2018-09-04 NOTE — TELEPHONE ENCOUNTER
Brought rx to .  Patient notified via message.  C2C on file for spouse Fernando Posadas Buddy

## 2018-09-04 NOTE — TELEPHONE ENCOUNTER
Controlled Substance Refill Request for oxycodone  Problem List Complete:  Yes    Last Written Prescription Date:  8/2/18  Last Fill Quantity: 120,   # refills: 0    Last Office Visit with [facility] primary care provider: 7/16/18    Clinic visit frequency required: not listed     Future Office visit:   Next 5 appointments (look out 90 days)     Sep 19, 2018  4:00 PM CDT   SHORT with Julius Hassan MD   [facility]    [address] [place] [id number] [phone]                  Controlled substance agreement on file: Yes:  Date 2/28/18.     Processing:  Patient will  in clinic    Katharina Goodwin

## 2018-09-04 NOTE — TELEPHONE ENCOUNTER
Message from [place]:  Original authorizing provider: MD Amanda Viera would like a refill of the following medications:  oxyCODONE IR (ROXICODONE) 15 MG tablet [Julius Hassan MD]    Preferred pharmacy: WRITTEN PRESCRIPTION REQUESTED    Comment:

## 2018-09-19 ENCOUNTER — OFFICE VISIT (OUTPATIENT)
Dept: FAMILY MEDICINE | Facility: CLINIC | Age: 53
End: 2018-09-19
Payer: COMMERCIAL

## 2018-09-19 VITALS
HEART RATE: 83 BPM | HEIGHT: 69 IN | DIASTOLIC BLOOD PRESSURE: 80 MMHG | BODY MASS INDEX: 36.29 KG/M2 | WEIGHT: 245 LBS | OXYGEN SATURATION: 95 % | TEMPERATURE: 98.4 F | SYSTOLIC BLOOD PRESSURE: 134 MMHG

## 2018-09-19 DIAGNOSIS — E78.5 HYPERLIPIDEMIA LDL GOAL <70: ICD-10-CM

## 2018-09-19 DIAGNOSIS — C85.81 OTHER SPECIFIED TYPE OF NON-HODGKIN LYMPHOMA OF HEAD (H): ICD-10-CM

## 2018-09-19 DIAGNOSIS — N18.1 CKD (CHRONIC KIDNEY DISEASE) STAGE 1, GFR 90 ML/MIN OR GREATER: ICD-10-CM

## 2018-09-19 DIAGNOSIS — C85.90 T-CELL LYMPHOMA (H): ICD-10-CM

## 2018-09-19 DIAGNOSIS — M48.061 SPINAL STENOSIS OF LUMBAR REGION, UNSPECIFIED WHETHER NEUROGENIC CLAUDICATION PRESENT: ICD-10-CM

## 2018-09-19 DIAGNOSIS — N18.1 TYPE 2 DIABETES MELLITUS WITH STAGE 1 CHRONIC KIDNEY DISEASE, WITHOUT LONG-TERM CURRENT USE OF INSULIN (H): ICD-10-CM

## 2018-09-19 DIAGNOSIS — I10 HYPERTENSION GOAL BP (BLOOD PRESSURE) < 140/90: ICD-10-CM

## 2018-09-19 DIAGNOSIS — Z51.81 MEDICATION MONITORING ENCOUNTER: ICD-10-CM

## 2018-09-19 DIAGNOSIS — F32.A MODERATE DEPRESSIVE EPISODE: ICD-10-CM

## 2018-09-19 DIAGNOSIS — M54.16 LUMBAR RADICULOPATHY: ICD-10-CM

## 2018-09-19 DIAGNOSIS — Z23 NEED FOR TDAP VACCINATION: ICD-10-CM

## 2018-09-19 DIAGNOSIS — G35 MS (MULTIPLE SCLEROSIS) (H): Primary | ICD-10-CM

## 2018-09-19 DIAGNOSIS — E66.01 MORBID OBESITY (H): ICD-10-CM

## 2018-09-19 DIAGNOSIS — J06.9 VIRAL URI: ICD-10-CM

## 2018-09-19 DIAGNOSIS — E11.22 TYPE 2 DIABETES MELLITUS WITH STAGE 1 CHRONIC KIDNEY DISEASE, WITHOUT LONG-TERM CURRENT USE OF INSULIN (H): ICD-10-CM

## 2018-09-19 DIAGNOSIS — Z23 NEED FOR PROPHYLACTIC VACCINATION AND INOCULATION AGAINST INFLUENZA: ICD-10-CM

## 2018-09-19 DIAGNOSIS — G89.4 CHRONIC PAIN SYNDROME: Chronic | ICD-10-CM

## 2018-09-19 PROCEDURE — 90686 IIV4 VACC NO PRSV 0.5 ML IM: CPT | Performed by: FAMILY MEDICINE

## 2018-09-19 PROCEDURE — G0008 ADMIN INFLUENZA VIRUS VAC: HCPCS | Mod: 59 | Performed by: FAMILY MEDICINE

## 2018-09-19 PROCEDURE — 90471 IMMUNIZATION ADMIN: CPT | Performed by: FAMILY MEDICINE

## 2018-09-19 PROCEDURE — 90715 TDAP VACCINE 7 YRS/> IM: CPT | Performed by: FAMILY MEDICINE

## 2018-09-19 PROCEDURE — 99214 OFFICE O/P EST MOD 30 MIN: CPT | Mod: 25 | Performed by: FAMILY MEDICINE

## 2018-09-19 RX ORDER — OXYCODONE HYDROCHLORIDE 15 MG/1
7.5-15 TABLET ORAL EVERY 4 HOURS PRN
Qty: 122 TABLET | Refills: 0 | Status: SHIPPED | OUTPATIENT
Start: 2018-10-04 | End: 2018-10-31

## 2018-09-19 NOTE — PROGRESS NOTES
SUBJECTIVE:   Amanda Richardson is a 53 year old female who presents to clinic today for the following health issues:    DM/CKD/Hyperlipidemia Follow-Up    Rate your low fat/cholesterol diet?: good    Taking statin?  Yes, no muscle aches from statin    Other lipid medications/supplements?:  None  Patient's hyperlipidemia is well controlled by Atorvastatin 10 mg daily.    Lab Results   Component Value Date    A1C 6.8 06/20/2018    A1C 6.5 10/02/2017    A1C 6.6 05/02/2016     Creatinine   Date Value Ref Range Status   06/20/2018 0.52 0.52 - 1.04 mg/dL Final     Recent Labs   Lab Test  06/20/18   1529  05/02/16   1613  12/08/14   1756  11/13/12   0957   CHOL  141  169  172  205*   HDL  33*  35*  36*  32*   LDL  82  107*  97  140*   TRIG  130  135  197*  167*   CHOLHDLRATIO   --    --   4.8  6.5*     Hypertension Follow-up    Outpatient blood pressures are not being checked.    Low Salt Diet: no added salt  Patient presents today as normotensive. Her blood pressure is well controlled by 100-25 mg daily Losarten-hydrochlorothiazide and 50 mg daily Metoprolol succinate.    BP Readings from Last 5 Encounters:   09/19/18 134/80   07/16/18 124/70   06/13/18 124/64   02/12/18 134/72   11/01/17 110/62     Creatinine   Date Value Ref Range Status   06/20/2018 0.52 0.52 - 1.04 mg/dL Final     NHL/T Cell lymphoma    No issues    Depression Followup    Status since last visit: Stable     See PHQ-9 for current symptoms.  Other associated symptoms: None  Patient's PHQ9 score was 4 and GAD7 score was 0 today. Well controlled by Amitriptyline 100 mg daily. Patient also prescribed Duloxetine 60 mg daily for depression and nerve pain.    PHQ-9 2/13/2018 7/16/2018 9/20/2018   Total Score 13 7 4   Q9: Suicide Ideation Not at all Not at all Not at all        PHQ-9  English  PHQ-9   Any Language  Suicide Assessment Five-step Evaluation and Treatment (SAFE-T)    Diabetes: Patient reports blood glucose range of  mg/dL. Well  controlled by 500 mg daily Metformin.    Glucose   Date Value Ref Range Status   06/20/2018 101 (H) 70 - 99 mg/dL Final   09/28/2017 150 (H) 70 - 99 mg/dL Final   04/10/2017 159 (H) 70 - 99 mg/dL Final   01/21/2017 102 (H) 70 - 99 mg/dL Final   05/02/2016 118 (H) 70 - 99 mg/dL Final     Lab Results   Component Value Date    A1C 6.8 06/20/2018    A1C 6.5 10/02/2017    A1C 6.6 05/02/2016     MS: Patient sees Dr. Griffin at [facility] for MS treatment. Patient has an appointment with him in October.    Back Pain: Patient reports pain localized on lower right side. Patient prescribed four 15 mg tablets of Oxycodone per day in every 4 hours. Had extensive discussion over when her refills are due, very concerned she will run out early, takes the oxycodone on a regular schedule, and doesn't miss a dose, concerned over when months have 31 days, reviewed with her extensively, offered return to [facility]    Problem list and histories reviewed & adjusted, as indicated.  Additional history: as documented    body mass index is 36.71 kg/(m^2).    Wt Readings from Last 4 Encounters:   09/19/18 245 lb (111.1 kg)   07/16/18 260 lb (117.9 kg)   06/13/18 260 lb (117.9 kg)   02/12/18 260 lb (117.9 kg)       Health Maintenance    Health Maintenance Due   Topic Date Due     FOOT EXAM Q1 YEAR  05/08/1966     EYE EXAM Q1 YEAR  05/08/1966     FIT Q1 YR  05/08/1973     WELLNESS VISIT Q1 YR  05/02/2017     DEXA Q5 YR  06/26/2017       Current Problem List    Patient Active Problem List   Diagnosis     MS (multiple sclerosis) (H)     Non Hodgkin's lymphoma (H)     Leukocytosis     Gallstone     Hypertension goal BP (blood pressure) < 140/90     Spinal stenosis, lumbar     Abnormality of gait     Pain medication agreement- signed Nov 20,2012     Lumbar radiculopathy     Colon polyps     Neuralgia, neuritis, and radiculitis, unspecified     Encounter for long-term current use of medication     Health Care Home     Moderate  depressive episode (H)     Leg muscle spasm     Chronic pain syndrome     Controlled substance agreement signed     T-cell lymphoma (H)     Tobacco abuse     Type 2 diabetes, HbA1c goal < 7% (H)     Hyperlipidemia LDL goal <70     CKD (chronic kidney disease) stage 1, GFR 90 ml/min or greater     Type 2 diabetes mellitus with stage 1 chronic kidney disease, without long-term current use of insulin (H)     Morbid obesity (H)       Past Medical History    Past Medical History:   Diagnosis Date     Abnormality of gait 7/27/2012     Chronic pain     [facility] - yearly, next in summer 2018     CKD (chronic kidney disease) stage 1, GFR 90 ml/min or greater      Colon polyps 1/15    tubular adenomas x 2     Gallstone 6/11/2012     Hyperlipidemia LDL goal <70      Hypertension goal BP (blood pressure) < 140/90      Leukocytosis 6/11/2012     Moderate depressive episode (H)      MS (multiple sclerosis) (H) 2003    Dr Vigil/Gaby - [facility]     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     Tobacco abuse 06/11/2012    former     Type 2 diabetes, HbA1c goal < 7% (H)        Past Surgical History    Past Surgical History:   Procedure Laterality Date     COLONOSCOPY N/A 1/7/2015    tubular adenomas x 2 - due 5 yrs     FUSION LUMBAR ANTERIOR, FUSION LUMBAR POSTERIOR TWO LEVELS, COMBINED  10/17/2013    lumbar fusion - Dr Floyd     INSERT PUMP  BACLOFEN  04/2017    intrathecal baclofen pump implantation     IRRIGATION AND DEBRIDEMENT LOWER EXTREMITY, COMBINED Right 2015    Right ankle I&D d/t infection     OPEN REDUCTION INTERNAL FIXATION ANKLE  5/15    Right Bimalleolar ankle fx ORIF     OPEN REDUCTION INTERNAL FIXATION ANKLE Right 11/2015    Revision due to spasms pulling screws out of ankle     SINUS SURGERY  2011    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     XR LUMBAR EPIDURAL INJECTION INCL IMAGING  3/14    Left L4-5 Epidural Dr Winter       Current Medications    Current Outpatient Prescriptions   Medication Sig Dispense Refill     amitriptyline (ELAVIL) 100 MG tablet Take 1 tablet (100 mg) by mouth At Bedtime 90 tablet 3     ASPIRIN PO Take 162 mg by mouth daily       atorvastatin (LIPITOR) 10 MG tablet Take 1 tablet (10 mg) by mouth daily 90 tablet 3     baclofen (LIORESAL) 10 MG tablet Take 1 tablet (10 mg) by mouth 3 times daily May take an additional 10 mg as needed qd for total of 40 mg per day 120 tablet 1     Cholecalciferol (VITAMIN D PO) Take 1 tablet by mouth daily 1000 IU daily       DULoxetine (CYMBALTA) 30 MG EC capsule Take 2 capsules (60 mg) by mouth 2 times daily 360 capsule 3     gabapentin (NEURONTIN) 600 MG tablet TAKE ONE TABLET BY MOUTH THREE TIMES DAILY AND MAY TAKE ONE ADDITIONAL AS NEEDED 270 tablet 1     losartan-hydrochlorothiazide (HYZAAR) 100-25 MG per tablet TAKE ONE TABLET BY MOUTH ONCE DAILY 90 tablet 3     metFORMIN (GLUCOPHAGE) 500 MG tablet Take 1 tablet (500 mg) by mouth daily (with dinner) 90 tablet 3     metoprolol succinate (TOPROL-XL) 50 MG 24 hr tablet TAKE 1 TABLET BY MOUTH ONCE DAILY 90 tablet 0     Multiple Vitamin (MULTI-VITAMIN PO) Take 1 tablet by mouth daily        omega-3 fatty acids (FISH OIL) 1200 MG capsule Take 1 capsule by mouth daily.       [START ON 10/4/2018] oxyCODONE IR (ROXICODONE) 15 MG tablet Take 0.5-1 tablets (7.5-15 mg) by mouth every 4 hours as needed for moderate to  severe pain Limit 4 tablet(s) per day. 122 tablet 0       Allergies    Allergies   Allergen Reactions     Lisinopril      Lip swelling     Cyclobenzaprine Other (See Comments)     Per 4-10-17 H&P by Yanna Dugan PA-C.     Flexeril [Cyclobenzaprine Hcl]      Got confused        Immunizations    Immunization History   Administered Date(s) Administered     Influenza (H1N1) 12/10/2009     Influenza (IIV3) PF 12/10/2009, 11/06/2012, 12/08/2014     Influenza Vaccine IM 3yrs+ 4 Valent IIV4 10/21/2013, 09/19/2018     Influenza Vaccine, 3 YRS +, IM (QUADRIVALENT W/PRESERVATIVES) 09/28/2017     Pneumococcal 23 valent 01/05/2011     TDAP Vaccine (Adacel) 09/19/2018     Tdap (Adacel,Boostrix) 09/17/2008       Family History    Family History   Problem Relation Age of Onset     C.A.D. Father      with CHF      Cancer Father      ? unsure type - abdominal      Hypertension Mother      Thyroid Disease Mother      goiter      Breast Cancer Sister 58     Thyroid Disease Son      Cancer - colorectal No family hx of        Social History    Social History     Social History     Marital status:      Spouse name: Fernando     Number of children: 3     Years of education: 14     Occupational History      Unemployed     Social History Main Topics     Smoking status: Current Every Day Smoker     Packs/day: 0.50     Types: Cigarettes     Last attempt to quit: 8/4/2013     Smokeless tobacco: Never Used      Comment: since age 17     Alcohol use No     Drug use: No     Sexual activity: Yes     Partners: Male     Other Topics Concern     Parent/Sibling W/ Cabg, Mi Or Angioplasty Before 65f 55m? No     Caffeine Concern Yes     occas     Exercise Yes     as able     Seat Belt Yes     Social History Narrative       All above reviewed and updated, all stable unless otherwise noted    Recent labs reviewed    ROS:  Constitutional, HEENT, cardiovascular, pulmonary, GI, , musculoskeletal, neuro, skin, endocrine and psych systems are negative,  "except as otherwise noted.    OBJECTIVE:                                                    /80  Pulse 83  Temp 98.4  F (36.9  C) (Oral)  Ht 5' 8.5\" (1.74 m)  Wt 245 lb (111.1 kg)  LMP 12/11/2011  SpO2 95%  BMI 36.71 kg/m2  Body mass index is 36.71 kg/(m^2).  GENERAL: healthy, alert and no distress  EYES: Eyes grossly normal to inspection  HENT:ear canals and TM's normal upon viewing with otoscope, nose and mouth without ulcers or lesions upon viewing with otoscope  RESP: lungs clear to auscultation - no rales, no rhonchi, scattered wheeze  CV: regular rates and rhythm, normal S1 S2, no S3 or S4 and no murmur, no click or rub -  MS: extremities- no gross deformities noted, mild edema  SKIN: no suspicious lesions, no rashes  NEURO: strength and tone- normal, sensory exam- grossly normal, mentation- intact, speech- normal  PSYCH: Alert and oriented times 3; speech- coherent , normal rate and volume; able to articulate logical thoughts, able to abstract reason, no tangential thoughts, no hallucinations or delusions, affect- normal    DIAGNOSTICS/PROCEDURES:                                                      No results found for this or any previous visit (from the past 24 hour(s)).     ASSESSMENT/PLAN:                                                        ICD-10-CM    1. MS (multiple sclerosis) (H) G35 oxyCODONE IR (ROXICODONE) 15 MG tablet   2. Chronic pain syndrome G89.4 oxyCODONE IR (ROXICODONE) 15 MG tablet   3. Spinal stenosis of lumbar region, unspecified whether neurogenic claudication present M48.061 oxyCODONE IR (ROXICODONE) 15 MG tablet   4. Lumbar radiculopathy M54.16 oxyCODONE IR (ROXICODONE) 15 MG tablet   5. 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 T-cell lymphoma (H) C85.90    7. Type 2 diabetes mellitus with stage 1 chronic kidney disease, without long-term current use of insulin (H) E11.22     N18.1    8. CKD (chronic kidney disease) stage 1, GFR 90 ml/min " or greater N18.1    9. Hypertension goal BP (blood pressure) < 140/90 I10    10. Hyperlipidemia LDL goal <70 E78.5    11. Moderate depressive episode (H) F32.1    12. Viral URI J06.9     B97.89    13. Morbid obesity (H) E66.01    14. Medication monitoring encounter Z51.81    15. Need for prophylactic vaccination and inoculation against influenza Z23 FLU VACCINE, SPLIT VIRUS, IM (QUADRIVALENT) [70898]- >3 YRS     Vaccine Administration, Initial [63654]   16. Need for Tdap vaccination Z23 TDAP, IM (10 - 64 YRS) - Adacel     EA ADD'L VACCINE     Discussed treatment/modality options, including risk and benefits, she desires. All diagnosis above reviewed and noted above, otherwise stable.  See Hang w/ orders for further details.  Follow up as needed.    1) Patient's hyperlipidemia is well controlled by Atorvastatin 10 mg daily. Advised to continue use. Prescription refilled today.    2) Patient presents today as normotensive. Her blood pressure is well controlled by 100-25 mg daily Losarten-hydrochlorothiazide and 50 mg daily Metoprolol succinate. Advised continued use.     3) Patient's PHQ9 score was 4 and GAD7 score was 0 today. Her depression/nerve pain is well controlled by Amitriptyline 100 mg daily and  Duloxetine 60 mg daily. Advised continued use.    4) Patient reports blood glucose range of  mg/dL. Well controlled by 500 mg daily Metformin. Advised to continue use.    5) Patient has chronic back pain. She is prescribed 15 mg Oxycodone QID. Patient reports pin is well controlled. Prescription refilled today, postdated, increased from 120 per month to 122, for months with 31 days, will refer back to [facility] if any further concerns.    6) Patient received flu shot and Tetanus booster today.    7) Follow up in 3 months for med check.    Health Maintenance Due   Topic Date Due     FOOT EXAM Q1 YEAR  05[birth year]08/1966     EYE EXAM Q1 YEAR  05[birth year]08/1966     FIT Q1 YR  05/08/1973     WELLNESS VISIT Q1 YR   05/02/2017     DEXA Q5 YR  06/26/2017     This document serves as a record of the services and decisions personally performed and made by Julius Hassan MD. It was created on his behalf by Gaurav Owen, a trained medical scribe. The creation of this document is based on the provider's statements to the medical scribe.  Gaurav Owen September 19, 2018 4:18 PM     The information in this document, created by the medical scribe for me, accurately reflects the services I personally performed and the decisions made by me. I have reviewed and approved this document for accuracy prior to leaving the patient care area.  September 19, 2018            Julius Hassan MD [address]  [address]  [phone] Fax      Injectable Influenza Immunization Documentation    1.  Is the person to be vaccinated sick today?   No    2. Does the person to be vaccinated have an allergy to a component   of the vaccine?   No  Egg Allergy Algorithm Link    3. Has the person to be vaccinated ever had a serious reaction   to influenza vaccine in the past?   No    4. Has the person to be vaccinated ever had Guillain-Barré syndrome?   No    Form completed by Radha Young, Medical Assistant

## 2018-09-19 NOTE — PATIENT INSTRUCTIONS
Recommend Shingrix vaccine for shingles.    [facility]                        To reach your care team during and after hours:   [phone]  To reach our pharmacy:        [phone]    Clinic Hours                        Our clinic hours are:    Monday   7:30 am to 7:00 pm                  Tuesday through Friday 7:30 am to 5:00 pm                             Saturday   8:00 am to 12:00 pm      Sunday   Closed      Pharmacy Hours                        Our pharmacy hours are:    Monday   8:30 am to 7:00 pm       Tuesday to Friday  8:30 am to 6:00 pm                       Saturday    9:00 am to 1:00 pm              Sunday    Closed              There is also information available at our web site:  [email or web address]    If your provider ordered any lab tests and you do not receive the results within 10 business days, please call the clinic.    If you need a medication refill please contact your pharmacy.  Please allow 2-3 business days for your refill to be completed.    Our clinic offers telephone visits and e visits.  Please ask one of your team members to explain more.      Use [employer] (secure email communication and access to your chart) to send your primary care provider a message or make an appointment. Ask someone on your Team how to sign up for [employer].  Immunizations                      Immunization History   Administered Date(s) Administered     Influenza (H1N1) 12/10/2009     Influenza (IIV3) PF 12/10/2009, 11/06/2012, 12/08/2014     Influenza Vaccine IM 3yrs+ 4 Valent IIV4 10/21/2013, 09/19/2018     Influenza Vaccine, 3 YRS +, IM (QUADRIVALENT W/PRESERVATIVES) 09/28/2017     Pneumococcal 23 valent 01/05/2011     TDAP Vaccine (Adacel) 09/19/2018     Tdap (Adacel,Boostrix) 09/17/2008        Health Maintenance                         Health Maintenance Due   Topic Date Due     Diabetic Foot Exam - yearly  05/08/1966     Eye Exam - yearly  05[birth year]08/1966     Colon Cancer Screening - FIT Test -  yearly  05/08/1973     Wellness Visit with your Primary Provider - yearly  05/02/2017     Osteoporosis Screening (Dexa) - every 5 years   06/26/2017     Flu Vaccine (1) 09/01/2018     Tetanus Vaccine - every 10 years  09/17/2018

## 2018-09-19 NOTE — MR AVS SNAPSHOT
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)        T-cell lymphoma (H)        Type 2 diabetes mellitus with stage 1 chronic kidney disease, without long-term current use of insulin (H)        CKD (chronic kidney disease) stage 1, GFR 90 ml/min or greater        Hypertension goal BP (blood pressure) < 140/90        Hyperlipidemia LDL goal <70        Moderate depressive episode (H)        Viral URI        Morbid obesity (H)        Medication monitoring encounter        Need for prophylactic vaccination and inoculation against influenza        Need for Tdap vaccination          Care Instructions    Recommend Shingrix vaccine for shingles.    [facility]                        To reach your care team during and after hours:   [phone]  To reach our pharmacy:        [phone]    Clinic Hours                        Our clinic hours are:    Monday   7:30 am to 7:00 pm                  Tuesday through Friday 7:30 am to 5:00 pm                             Saturday   8:00 am to 12:00 pm      Sunday   Closed      Pharmacy Hours                        Our pharmacy hours are:    Monday   8:30 am to 7:00 pm       Tuesday to Friday  8:30 am to 6:00 pm                       Saturday    9:00 am to 1:00 pm              Sunday    Closed              There is also information available at our web site:  [email or web address]    If your provider ordered any lab tests and you do not receive the results within 10 business  days, please call the clinic.    If you need a medication refill please contact your pharmacy.  Please allow 2-3 business days for your refill to be completed.    Our clinic offers telephone visits and e visits.  Please ask one of your team members to explain more.      Use [employer] (secure email communication and access to your chart) to send your primary care provider a message or make an appointment. Ask someone on your Team how to sign up for [employer].  Immunizations                      Immunization History   Administered Date(s) Administered     Influenza (H1N1) 12/10/2009     Influenza (IIV3) PF 12/10/2009, 11/06/2012, 12/08/2014     Influenza Vaccine IM 3yrs+ 4 Valent IIV4 10/21/2013     Influenza Vaccine, 3 YRS +, IM (QUADRIVALENT W/PRESERVATIVES) 09/28/2017     Pneumococcal 23 valent 01/05/2011     Tdap (Adacel,Boostrix) 09/17/2008        Health Maintenance                         Health Maintenance Due   Topic Date Due     Diabetic Foot Exam - yearly  05/08/1966     Eye Exam - yearly  [birth year]08/1966     Colon Cancer Screening - FIT Test - yearly  05/08/1973     Wellness Visit with your Primary Provider - yearly  05/02/2017     Osteoporosis Screening (Dexa) - every 5 years   06/26/2017     Flu Vaccine (1) 09/01/2018     Tetanus Vaccine - every 10 years  09/17/2018               Follow-ups after your visit        Your next 10 appointments already scheduled     Oct 04, 2018  2:30 PM CDT   Diabetes Education with  DIABETIC ED RESOURCE   [facility] ([facility])    [address] [place] [address] [address]  [facility] [address]   [phone]              Who to contact     If you have questions or need follow up information about today's clinic visit or your schedule please contact [facility] directly at [phone].  Normal or non-critical lab and imaging results will be communicated to you by MyChart, letter or phone within 4 business days after the clinic has  "received the results. If you do not hear from us within 7 days, please contact the clinic through [employer] or phone. If you have a critical or abnormal lab result, we will notify you by phone as soon as possible.  Submit refill requests through [employer] or call your pharmacy and they will forward the refill request to us. Please allow 3 business days for your refill to be completed.          Additional Information About Your Visit        [employer] Information     [employer] gives you secure access to your electronic health record. If you see a primary care provider, you can also send messages to your care team and make appointments. If you have questions, please call your primary care clinic.  If you do not have a primary care provider, please call [phone] and they will assist you.        Care EveryWhere ID     This is your Care EveryWhere ID. This could be used by other organizations to access your [facility] medical records  [id number]        Your Vitals Were     Pulse Temperature Height Last Period Pulse Oximetry BMI (Body Mass Index)    83 98.4  F (36.9  C) (Oral) 5' 8.5\" (1.74 m) 12/11/2011 95% 36.71 kg/m2       Blood Pressure from Last 3 Encounters:   09/19/18 134/80   07/16/18 124/70   06/13/18 124/64    Weight from Last 3 Encounters:   09/19/18 245 lb (111.1 kg)   07/16/18 260 lb (117.9 kg)   06/13/18 260 lb (117.9 kg)              We Performed the Following     FLU VACCINE, SPLIT VIRUS, IM (QUADRIVALENT) [04057]- >3 YRS     TDAP, IM (10 - 64 YRS) - Adacel     Vaccine Administration, Initial [85523]          Today's Medication Changes          These changes are accurate as of 9/19/18  5:04 PM.  If you have any questions, ask your nurse or doctor.               These medicines have changed or have updated prescriptions.        Dose/Directions    oxyCODONE IR 15 MG tablet   Commonly known as:  ROXICODONE   This may have changed:  These instructions start on 10/4/2018. If you are unsure what to do until then, ask " your doctor or other care provider.   Used for:  MS (multiple sclerosis) (H), Chronic pain syndrome   Changed by:  Julius Hassan MD        Dose:  7.5-15 mg   Start taking on:  10/4/2018   Take 0.5-1 tablets (7.5-15 mg) by mouth every 4 hours as needed for moderate to severe pain Limit 4 tablet(s) per day.   Quantity:  122 tablet   Refills:  0            Where to get your medicines      Some of these will need a paper prescription and others can be bought over the counter.  Ask your nurse if you have questions.     Bring a paper prescription for each of these medications     oxyCODONE IR 15 MG tablet               Information about OPIOIDS     PRESCRIPTION OPIOIDS: WHAT YOU NEED TO KNOW   We gave you an opioid (narcotic) pain medicine. It is important to manage your pain, but opioids are not always the best choice. You should first try all the other options your care team gave you. Take this medicine for as short a time (and as few doses) as possible.    Some activities can increase your pain, such as bandage changes or therapy sessions. It may help to take your pain medicine 30 to 60 minutes before these activities. Reduce your stress by getting enough sleep, working on hobbies you enjoy and practicing relaxation or meditation. Talk to your care team about ways to manage your pain beyond prescription opioids.    These medicines have risks:    DO NOT drive when on new or higher doses of pain medicine. These medicines can affect your alertness and reaction times, and you could be arrested for driving under the influence (DUI). If you need to use opioids long-term, talk to your care team about driving.    DO NOT operate heavy machinery    DO NOT do any other dangerous activities while taking these medicines.    DO NOT drink any alcohol while taking these medicines.     If the opioid prescribed includes acetaminophen, DO NOT take with any other medicines that contain acetaminophen. Read all labels carefully. Look for the  word  acetaminophen  or  Tylenol.  Ask your pharmacist if you have questions or are unsure.    You can get addicted to pain medicines, especially if you have a history of addiction (chemical, alcohol or substance dependence). Talk to your care team about ways to reduce this risk.    All opioids tend to cause constipation. Drink plenty of water and eat foods that have a lot of fiber, such as fruits, vegetables, prune juice, apple juice and high-fiber cereal. Take a laxative (Miralax, milk of magnesia, Colace, Senna) if you don t move your bowels at least every other day. Other side effects include upset stomach, sleepiness, dizziness, throwing up, tolerance (needing more of the medicine to have the same effect), physical dependence and slowed breathing.    Store your pills in a secure place, locked if possible. We will not replace any lost or stolen medicine. If you don t finish your medicine, please throw away (dispose) as directed by your pharmacist. The [place] Pollution [employer] has more information about safe disposal: https://www.pca.[facility].[place].us/living-green/managing-unwanted-medications         Primary Care Provider Office Phone # Fax #    Julius Hassan [phone] [address] [place] [address]        Goals        General    Healthy Eating (pt-stated)     Notes - Note created  8/30/2018  2:46 PM by Paula Angela RD    Goal Statement: I will eat 2-3 carbs (30-45 total grams carbohydrate) and 0-1 serving (0-15 total carbohydate) per snack    Measure of Success: food records at next visit    Strengths: desire to control diabetes  Ideas to overcome barriers: new system    Date to Achieve By: 10/30/18        I will continue to work with home care until discharge goals are met. (pt-stated)     Notes - Note edited  10/28/2015  3:09 PM by Cordelia Moreland RN    As of today's date 9/22/2015 goal is met at 76 - 100%.   Goal Status:  Ongoing  As of today's date 10/28/2015  goal is met at 76 - 100%.   Goal Status:  Complete            Equal Access to Services     ALLSIOMARA JAMIE : Hadii martha swain monroe Britoali, wajuniorda kotajosiahha, nic ariclobitochrista ferrellmiltonchrista, marco crespo brendavikram marqeuzrangeljose valdez. So [facility] [phone].    ATENCIÓN: Si habla español, tiene a woods disposición servicios gratuitos de asistencia lingüística. [facility] [phone].    We comply with applicable federal civil rights laws and [place] laws. We do not discriminate on the basis of race, color, national origin, age, disability, sex, sexual orientation, or gender identity.            Thank you!     Thank you for choosing [facility]  for your care. Our goal is always to provide you with excellent care. Hearing back from our patients is one way we can continue to improve our services. Please take a few minutes to complete the written survey that you may receive in the mail after your visit with us. Thank you!             Your Updated Medication List - Protect others around you: Learn how to safely use, store and throw away your medicines at www.disposemymeds.org.          This list is accurate as of 9/19/18  5:04 PM.  Always use your most recent med list.                   Brand Name Dispense Instructions for use Diagnosis    amitriptyline 100 MG tablet    ELAVIL    90 tablet    Take 1 tablet (100 mg) by mouth At Bedtime    Lumbar radiculopathy, Chronic pain syndrome, MS (multiple sclerosis) (H), Moderate depressive episode (H)       ASPIRIN PO      Take 162 mg by mouth daily        atorvastatin 10 MG tablet    LIPITOR    90 tablet    Take 1 tablet (10 mg) by mouth daily    Hyperlipidemia LDL goal <70       baclofen 10 MG tablet    LIORESAL    120 tablet    Take 1 tablet (10 mg) by mouth 3 times daily May take an additional 10 mg as needed qd for total of 40 mg per day    Chronic pain of both lower extremities       DULoxetine 30 MG EC capsule    CYMBALTA    360 capsule    Take 2 capsules (60 mg) by mouth  2 times daily    Lumbar radiculopathy, MS (multiple sclerosis) (H), Chronic pain syndrome, Moderate depressive episode (H)       gabapentin 600 MG tablet    NEURONTIN    270 tablet    TAKE ONE TABLET BY MOUTH THREE TIMES DAILY AND MAY TAKE ONE ADDITIONAL AS NEEDED    Chronic pain of both lower extremities       losartan-hydrochlorothiazide 100-25 MG per tablet    HYZAAR    90 tablet    TAKE ONE TABLET BY MOUTH ONCE DAILY    Hypertension goal BP (blood pressure) < 140/90       metFORMIN 500 MG tablet    GLUCOPHAGE    90 tablet    Take 1 tablet (500 mg) by mouth daily (with dinner)    Type 2 diabetes mellitus with stage 1 chronic kidney disease, without long-term current use of insulin (H)       metoprolol succinate 50 MG 24 hr tablet    TOPROL-XL    90 tablet    TAKE 1 TABLET BY MOUTH ONCE DAILY    Hypertension goal BP (blood pressure) < 140/90       MULTI-VITAMIN PO      Take 1 tablet by mouth daily        omega-3 fatty acids 1200 MG capsule      Take 1 capsule by mouth daily.        oxyCODONE IR 15 MG tablet   Start taking on:  10/4/2018    ROXICODONE    122 tablet    Take 0.5-1 tablets (7.5-15 mg) by mouth every 4 hours as needed for moderate to severe pain Limit 4 tablet(s) per day.    MS (multiple sclerosis) (H), Chronic pain syndrome       VITAMIN D PO      Take 1 tablet by mouth daily 1000 IU daily

## 2018-09-20 ASSESSMENT — PATIENT HEALTH QUESTIONNAIRE - PHQ9: 5. POOR APPETITE OR OVEREATING: NOT AT ALL

## 2018-09-20 ASSESSMENT — ANXIETY QUESTIONNAIRES
1. FEELING NERVOUS, ANXIOUS, OR ON EDGE: NOT AT ALL
5. BEING SO RESTLESS THAT IT IS HARD TO SIT STILL: NOT AT ALL
6. BECOMING EASILY ANNOYED OR IRRITABLE: NOT AT ALL
3. WORRYING TOO MUCH ABOUT DIFFERENT THINGS: NOT AT ALL
2. NOT BEING ABLE TO STOP OR CONTROL WORRYING: NOT AT ALL
IF YOU CHECKED OFF ANY PROBLEMS ON THIS QUESTIONNAIRE, HOW DIFFICULT HAVE THESE PROBLEMS MADE IT FOR YOU TO DO YOUR WORK, TAKE CARE OF THINGS AT HOME, OR GET ALONG WITH OTHER PEOPLE: NOT DIFFICULT AT ALL
GAD7 TOTAL SCORE: 0
7. FEELING AFRAID AS IF SOMETHING AWFUL MIGHT HAPPEN: NOT AT ALL

## 2018-09-21 ASSESSMENT — ANXIETY QUESTIONNAIRES: GAD7 TOTAL SCORE: 0

## 2018-09-21 ASSESSMENT — PATIENT HEALTH QUESTIONNAIRE - PHQ9: SUM OF ALL RESPONSES TO PHQ QUESTIONS 1-9: 4

## 2018-09-27 DIAGNOSIS — I10 HYPERTENSION GOAL BP (BLOOD PRESSURE) < 140/90: ICD-10-CM

## 2018-09-27 RX ORDER — METOPROLOL SUCCINATE 50 MG/1
TABLET, EXTENDED RELEASE ORAL
Qty: 90 TABLET | Refills: 1 | Status: SHIPPED | OUTPATIENT
Start: 2018-09-27 | End: 2019-08-15

## 2018-09-27 NOTE — TELEPHONE ENCOUNTER
Prescription approved per [facility] Refill Protocol.    Chandrika Nathan, BS, RN, N  [facility]) [phone]

## 2018-09-27 NOTE — TELEPHONE ENCOUNTER
"Requested Prescriptions   Pending Prescriptions Disp Refills     metoprolol succinate (TOPROL-XL) 50 MG 24 hr tablet [Pharmacy Med Name: METOPROLOL ER 50MG  TAB]  Last Written Prescription Date:  06/25/2018  Last Fill Quantity: 90 tablet,  # refills: 0   Last office visit: No previous visit found with prescribing provider:  09/19/2018 Julius Hassan MD    Future Office Visit:     90 tablet 0     Sig: TAKE 1 TABLET BY MOUTH ONCE DAILY    Beta-Blockers Protocol Passed    9/27/2018 10:21 AM       Passed - Blood pressure under 140/90 in past 12 months    BP Readings from Last 3 Encounters:   09/19/18 134/80   07/16/18 124/70   06/13/18 124/64                Passed - Patient is age 6 or older       Passed - Recent (12 mo) or future (30 days) visit within the authorizing provider's specialty    Patient had office visit in the last 12 months or has a visit in the next 30 days with authorizing provider or within the authorizing provider's specialty.  See \"Patient Info\" tab in inbasket, or \"Choose Columns\" in Meds & Orders section of the refill encounter.              "

## 2018-10-04 ENCOUNTER — ALLIED HEALTH/NURSE VISIT (OUTPATIENT)
Dept: EDUCATION SERVICES | Facility: CLINIC | Age: 53
End: 2018-10-04
Payer: COMMERCIAL

## 2018-10-04 DIAGNOSIS — E11.9 DIABETES MELLITUS WITHOUT COMPLICATION (H): Primary | ICD-10-CM

## 2018-10-04 PROCEDURE — G0108 DIAB MANAGE TRN  PER INDIV: HCPCS

## 2018-10-04 NOTE — MR AVS SNAPSHOT
After Visit Summary   10/4/2018    Amanda Richardson    MRN: [id number]           Patient Information     Date Of Birth          1965        Visit Information        Provider Department      10/4/2018 2:30 PM [facility]ETIC ED RES[facility]        Care Instructions    Check blood sugars a few times per week, aiming for at least 1-2 numbers in the morning, and 1-2 two hours after eating    Before meal goal 80-130mg/dL  2 hours after a meal goal <180 mg/dL    15 grams total carbohydrate = 1 carb choice    Aim for 2-3 carb choices per meal and 1 carb choice per snack           Follow-ups after your visit        Follow-up notes from your care team     Return in about 1 year (around 10/4/2019).      Who to contact     If you have questions or need follow up information about today's clinic visit or your schedule please contact [facility] directly at [phone].  Normal or non-critical lab and imaging results will be communicated to you by [employer], letter or phone within 4 business days after the clinic has received the results. If you do not hear from us within 7 days, please contact the clinic through [employer] or phone. If you have a critical or abnormal lab result, we will notify you by phone as soon as possible.  Submit refill requests through [employer] or call your pharmacy and they will forward the refill request to us. Please allow 3 business days for your refill to be completed.          Additional Information About Your Visit        MyChart Information     [employer] gives you secure access to your electronic health record. If you see a primary care provider, you can also send messages to your care team and make appointments. If you have questions, please call your primary care clinic.  If you do not have a primary care provider, please call [phone] and they will assist you.        Care EveryWhere ID     This is your Care EveryWhere  ID. This could be used by other organizations to access your [facility] medical records  [id number]        Your Vitals Were     Last Period                   12/11/2011            Blood Pressure from Last 3 Encounters:   09/19/18 134/80   07/16/18 124/70   06/13/18 124/64    Weight from Last 3 Encounters:   09/19/18 111.1 kg (245 lb)   07/16/18 117.9 kg (260 lb)   06/13/18 117.9 kg (260 lb)              Today, you had the following     No orders found for display       Primary Care Provider Office Phone # Fax #    Julius Hassan [phone] [address] [facility] [address]        Goals        General    Healthy Eating (pt-stated)     Notes - Note created  8/30/2018  2:46 PM by Paula Angela RD    Goal Statement: I will eat 2-3 carbs (30-45 total grams carbohydrate) and 0-1 serving (0-15 total carbohydate) per snack    Measure of Success: food records at next visit    Strengths: desire to control diabetes  Ideas to overcome barriers: new system    Date to Achieve By: 10/30/18        I will continue to work with home care until discharge goals are met. (pt-stated)     Notes - Note edited  10/28/2015  3:09 PM by Cordelia Moreland RN    As of today's date 9/22/2015 goal is met at 76 - 100%.   Goal Status:  Ongoing  As of today's date 10/28/2015 goal is met at 76 - 100%.   Goal Status:  Complete            Equal Access to Services     [facility] AH: Hadii martha swain hadasho Soomaali, waaxda luqadaha, qaybta kaalmada stepan, waxcelia cherie graves sobiakristen chavira . So [facility] [phone].    ATENCIÓN: Si habla español, tiene a woods disposición servicios gratuitos de asistencia lingüística. Llame al [phone].    We comply with applicable federal civil rights laws and [place] laws. We do not discriminate on the basis of race, color, national origin, age, disability, sex, sexual orientation, or gender identity.            Thank you!     Thank you for choosing [facility]  for your care. Our goal is always to provide you with excellent care. Hearing back from our patients is one way we can continue to improve our services. Please take a few minutes to complete the written survey that you may receive in the mail after your visit with us. Thank you!             Your Updated Medication List - Protect others around you: Learn how to safely use, store and throw away your medicines at www.disposemymeds.org.          This list is accurate as of 10/4/18  2:45 PM.  Always use your most recent med list.                   Brand Name Dispense Instructions for use Diagnosis    amitriptyline 100 MG tablet    ELAVIL    90 tablet    Take 1 tablet (100 mg) by mouth At Bedtime    Lumbar radiculopathy, Chronic pain syndrome, MS (multiple sclerosis) (H), Moderate depressive episode (H)       ASPIRIN PO      Take 162 mg by mouth daily        atorvastatin 10 MG tablet    LIPITOR    90 tablet    Take 1 tablet (10 mg) by mouth daily    Hyperlipidemia LDL goal <70       baclofen 10 MG tablet    LIORESAL    120 tablet    Take 1 tablet (10 mg) by mouth 3 times daily May take an additional 10 mg as needed qd for total of 40 mg per day    Chronic pain of both lower extremities       DULoxetine 30 MG EC capsule    CYMBALTA    360 capsule    Take 2 capsules (60 mg) by mouth 2 times daily    Lumbar radiculopathy, MS (multiple sclerosis) (H), Chronic pain syndrome, Moderate depressive episode (H)       gabapentin 600 MG tablet    NEURONTIN    270 tablet    TAKE ONE TABLET BY MOUTH THREE TIMES DAILY AND MAY TAKE ONE ADDITIONAL AS NEEDED    Chronic pain of both lower extremities       losartan-hydrochlorothiazide 100-25 MG per tablet    HYZAAR    90 tablet    TAKE ONE TABLET BY MOUTH ONCE DAILY    Hypertension goal BP (blood pressure) < 140/90       metFORMIN 500 MG tablet    GLUCOPHAGE    90 tablet    Take 1 tablet (500 mg) by mouth daily (with dinner)    Type 2 diabetes mellitus with stage 1 chronic  kidney disease, without long-term current use of insulin (H)       metoprolol succinate 50 MG 24 hr tablet    TOPROL-XL    90 tablet    TAKE 1 TABLET BY MOUTH ONCE DAILY    Hypertension goal BP (blood pressure) < 140/90       MULTI-VITAMIN PO      Take 1 tablet by mouth daily        omega-3 fatty acids 1200 MG capsule      Take 1 capsule by mouth daily.        oxyCODONE IR 15 MG tablet    ROXICODONE    122 tablet    Take 0.5-1 tablets (7.5-15 mg) by mouth every 4 hours as needed for moderate to severe pain Limit 4 tablet(s) per day.    MS (multiple sclerosis) (H), Chronic pain syndrome, Spinal stenosis of lumbar region, unspecified whether neurogenic claudication present, Lumbar radiculopathy       VITAMIN D PO      Take 1 tablet by mouth daily 1000 IU daily

## 2018-10-04 NOTE — PATIENT INSTRUCTIONS
Check blood sugars a few times per week, aiming for at least 1-2 numbers in the morning, and 1-2 two hours after eating    Before meal goal 80-130mg/dL  2 hours after a meal goal <180 mg/dL    15 grams total carbohydrate = 1 carb choice    Aim for 2-3 carb choices per meal and 1 carb choice per snack

## 2018-10-04 NOTE — PROGRESS NOTES
"Diabetes Self-Management Education & Support    Diabetes Education Self Management & Training    SUBJECTIVE/OBJECTIVE:  Presents for: Follow-up  Accompanied by: Self  Diabetes education in the past 24mo: No  Focus of Visit: Reducing Risks, Assistance w/ making life changes  Diabetes type: Type 2  Disease course: Stable  Cultural Influences/Ethnic Background:  American    Diabetes Symptoms & Complications  Weight trend: Stable       Patient Problem List and Family Medical History reviewed for relevant medical history, current medical status, and diabetes risk factors.    Vitals:  [facility] 12/11/2011  Estimated body mass index is 36.71 kg/(m^2) as calculated from the following:    Height as of 9/19/18: 1.74 m (5' 8.5\").    Weight as of 9/19/18: 111.1 kg (245 lb).   Last 3 BP:   BP Readings from Last 3 Encounters:   09/19/18 134/80   07/16/18 124/70   06/13/18 124/64       History   Smoking Status     Current Every Day Smoker     Packs/day: 0.50     Types: Cigarettes     Last attempt to quit: 8/4/2013   Smokeless Tobacco     Never Used     Comment: since age 17       Labs:  Lab Results   Component Value Date    A1C 6.8 06/20/2018     Lab Results   Component Value Date     06/20/2018     Lab Results   Component Value Date    LDL 82 06/20/2018     HDL Cholesterol   Date Value Ref Range Status   06/20/2018 33 (L) >49 mg/dL Final   ]  GFR Estimate   Date Value Ref Range Status   06/20/2018 >90 >60 mL/min/1.7m2 Final     Comment:     Non  GFR Calc     GFR Estimate If Black   Date Value Ref Range Status   06/20/2018 >90 >60 mL/min/1.7m2 Final     Comment:      GFR Calc     Lab Results   Component Value Date    CR 0.52 06/20/2018     No results found for: MICROALBUMIN    Healthy Eating  Cultural/Restorationist diet restrictions?: No  Meal planning:  (eat every 4-5 hours)  Meals include: Breakfast, Dinner, Snacks  Breakfast: breakfast sandwich ([employer] [place]), maybe a small glass of milk OR banana " and yogurt  Lunch: smaller meal  Dinner: late - fish OR tacos OR pasta OR steak/vegetable, sometimes with a starch -- water to drink, maybe diet coke   Snacks: 3pm - cheese and crackers Or smoked salmon and crackers  Beverages: Water, Diet soda, Tea  Has patient met with a dietitian in the past?: No    Being Active  Exercise:: Unable to exercise    Monitoring  Monitoring Assessed Today: Yes  Did patient bring glucose meter to appointment? : No  Home Glucose (Sugar) Monitoring: 3-4 times per week  Blood glucose trend: No change  Low Glucose Range (mg/dL): 70-90  High Glucose Range (mg/dL): 110-130    Did not bring blood sugar meter to clinic    Taking Medications  Diabetes Medication(s)     Biguanides Sig    metFORMIN (GLUCOPHAGE) 500 MG tablet Take 1 tablet (500 mg) by mouth daily (with dinner)          Taking Medication Assessed Today: Yes  Current Treatments: Oral Agent (monotherapy)  Problems taking diabetes medications regularly?: No  Diabetes medication side effects?: No  Treatment Compliance: All of the time    Problem Solving  Hypoglycemia Frequency: Never              Reducing Risks  Reducing Risks Assessed Today: Yes  Diabetes Risks: Age over 45 years  CAD Risks: Diabetes Mellitus, Tobacco exposure  Has dilated eye exam at least once a year?: No  Sees dentist every 6 months?: No  Sees podiatrist (foot doctor)?: No    Healthy Coping  Emotional response to diabetes: Ready to learn, Uncertain  Stage of change: PREPARATION (Decided to change - considering how)  Difficulty affording diabetes management supplies?: No  Patient Activation Measure Survey Score:  STELLA Score (Last Two) 11/20/2012   STELLA Raw Score 43   Activation Score 68.5   STELLA Level 4       ASSESSMENT:  Doing well, trying to eat every 4-5 hours and watching her portions.         Patient's most recent   Lab Results   Component Value Date    A1C 6.8 06/20/2018    is meeting goal of <7.0    INTERVENTION:   Diabetes knowledge and skills assessment:      Patient is knowledgeable in diabetes management concepts related to: Healthy Eating, Being Active, Monitoring, Taking Medication, Problem Solving, Reducing Risks and Healthy Coping    Patient needs further education on the following diabetes management concepts: None currently    Based on learning assessment above, most appropriate setting for further diabetes education would be: Group class or Individual setting.    Education provided today on:  AADE Self-Care Behaviors:  Healthy Eating: consistency in amount, composition, and timing of food intake and portion control  Monitoring: log and interpret results, individual blood glucose targets and frequency of monitoring  Reducing Risks: major complications of diabetes, prevention, early diagnostic measures and treatment of complications, foot care, appropriate dental care, annual eye exam, smoking cessation, aspirin therapy, A1C - goals, relating to blood glucose levels, how often to check, lipids levels and goals and blood pressure and goals  Healthy Coping: recognize feelings about diagnosis and benefits of making appropriate lifestyle changes    Opportunities for ongoing education and support in diabetes-self management were discussed.    Pt verbalized understanding of concepts discussed and recommendations provided today.       Education Materials Provided:  Goals of Diabetes Care and Heart Health Guidelines    PLAN:  See Patient Instructions for co-developed, patient-stated behavior change goals.  AVS printed and provided to patient today. See Follow-Up section for recommended follow-up.    JENNIFER Marks CDE  Time Spent: 30 minutes  Encounter Type: Individual    Any diabetes medication dose changes were made via the CDE Protocol and Collaborative Practice Agreement with the patient's referring provider. A copy of this encounter was shared with the provider.

## 2018-10-31 ENCOUNTER — MYC REFILL (OUTPATIENT)
Dept: FAMILY MEDICINE | Facility: CLINIC | Age: 53
End: 2018-10-31

## 2018-10-31 DIAGNOSIS — G35 MS (MULTIPLE SCLEROSIS) (H): ICD-10-CM

## 2018-10-31 DIAGNOSIS — G89.4 CHRONIC PAIN SYNDROME: Chronic | ICD-10-CM

## 2018-10-31 DIAGNOSIS — M54.16 LUMBAR RADICULOPATHY: ICD-10-CM

## 2018-10-31 DIAGNOSIS — M48.061 SPINAL STENOSIS OF LUMBAR REGION, UNSPECIFIED WHETHER NEUROGENIC CLAUDICATION PRESENT: ICD-10-CM

## 2018-10-31 RX ORDER — OXYCODONE HYDROCHLORIDE 15 MG/1
7.5-15 TABLET ORAL EVERY 4 HOURS PRN
Qty: 122 TABLET | Refills: 0 | Status: SHIPPED | OUTPATIENT
Start: 2018-11-03 | End: 2018-11-28

## 2018-10-31 NOTE — TELEPHONE ENCOUNTER
Controlled Substance Refill Request for Oxycodone  Problem List Complete:  Yes    Last Written Prescription Date:  10/4/2018  Last Fill Quantity: 122,   # refills: 0    Last Office Visit with [facility] primary care provider: 9/19/2018    Clinic visit frequency required: none noted     Future Office visit:     Controlled substance agreement on file: Yes:  Date 2/12/2018.     Processing:  see pt notes   checked in past 3 months?  No, route to RN    Routing refill request to provider for review/approval because:  Drug not on the [facility] refill protocol   Cheryl Garner RN  [place] [facility]

## 2018-10-31 NOTE — TELEPHONE ENCOUNTER
Message from Personallyt:  Original authorizing provider: MD Amanda Viera would like a refill of the following medications:  oxyCODONE IR (ROXICODONE) 15 MG tablet [Julius Hassan MD]    Preferred pharmacy: [facility] [address] [place] [address] [id number] [facility] [address]    Comment:

## 2018-11-28 ENCOUNTER — MYC REFILL (OUTPATIENT)
Dept: FAMILY MEDICINE | Facility: CLINIC | Age: 53
End: 2018-11-28

## 2018-11-28 DIAGNOSIS — M54.16 LUMBAR RADICULOPATHY: ICD-10-CM

## 2018-11-28 DIAGNOSIS — M48.061 SPINAL STENOSIS OF LUMBAR REGION, UNSPECIFIED WHETHER NEUROGENIC CLAUDICATION PRESENT: ICD-10-CM

## 2018-11-28 DIAGNOSIS — G89.4 CHRONIC PAIN SYNDROME: Chronic | ICD-10-CM

## 2018-11-28 DIAGNOSIS — G35 MS (MULTIPLE SCLEROSIS) (H): ICD-10-CM

## 2018-11-29 RX ORDER — OXYCODONE HYDROCHLORIDE 15 MG/1
7.5-15 TABLET ORAL EVERY 4 HOURS PRN
Qty: 122 TABLET | Refills: 0 | Status: SHIPPED | OUTPATIENT
Start: 2018-12-03 | End: 2018-12-28

## 2018-11-29 NOTE — TELEPHONE ENCOUNTER
Controlled Substance Refill Request for Oxycodone  Problem List Complete:  Yes    Last Written Prescription Date:  11/3/2018  Last Fill Quantity: 122,   # refills: 0    Last Office Visit with [facility] [place] care provider: 9/19/2018    Clinic visit frequency required: none noted     Future Office visit:     Controlled substance agreement on file: Yes:  Date 11/20/2012.     Processing:  Patient will  in clinic   checked in past 3 months?  Yes today, this was filled 11/1/18, no concerns     Routing refill request to provider for review/approval because:  Drug not on the [facility] refill protocol   Cheryl Garner RN  [facility]

## 2018-11-29 NOTE — TELEPHONE ENCOUNTER
Message from Invisible Puppyt:  Original authorizing provider: MD Amanda Viera would like a refill of the following medications:  oxyCODONE IR (ROXICODONE) 15 MG tablet [Julius Hassan MD]    Preferred pharmacy: [facility] [address] [place] [address] [address] [facility] [address]    Comment:  Please let me know when Fernando can  the prescription. Thanks, Amanda Richardson

## 2018-12-26 DIAGNOSIS — G35 MS (MULTIPLE SCLEROSIS) (H): ICD-10-CM

## 2018-12-26 DIAGNOSIS — M54.16 LUMBAR RADICULOPATHY: ICD-10-CM

## 2018-12-26 DIAGNOSIS — G89.4 CHRONIC PAIN SYNDROME: ICD-10-CM

## 2018-12-26 DIAGNOSIS — F32.A MODERATE DEPRESSIVE EPISODE: ICD-10-CM

## 2018-12-26 NOTE — TELEPHONE ENCOUNTER
"Requested Prescriptions   Pending Prescriptions Disp Refills     DULoxetine (CYMBALTA) 30 MG capsule [Pharmacy Med Name: DULoxetine 30MG     CAP] 270 capsule 3    Last Written Prescription Date:  2.12.18  Last Fill Quantity: 360,  # refills: 3   Last Office Visit: 9/19/2018   Future Office Visit:      Sig: TAKE ONE CAPSULE BY MOUTH ONCE DAILY IN THE MORNING AND TWO IN THE EVENING    Serotonin-Norepinephrine Reuptake Inhibitors  Passed - 12/26/2018 12:21 PM       Passed - Blood pressure under 140/90 in past 12 months    BP Readings from Last 3 Encounters:   09/19/18 134/80   07/16/18 124/70   06/13/18 124/64                Passed - PHQ-9 score of less than 5 in past 6 months    Please review last PHQ-9 score.          Passed - Patient is age 18 or older       Passed - No active pregnancy on record       Passed - No positive pregnancy test in past 12 months       Passed - Recent (6 mo) or future (30 days) visit within the authorizing provider's specialty    Patient had office visit in the last 6 months or has a visit in the next 30 days with authorizing provider or within the authorizing provider's specialty.  See \"Patient Info\" tab in inbasket, or \"Choose Columns\" in Meds & Orders section of the refill encounter.              "

## 2018-12-27 RX ORDER — DULOXETIN HYDROCHLORIDE 30 MG/1
CAPSULE, DELAYED RELEASE ORAL
Qty: 270 CAPSULE | Refills: 0 | Status: ON HOLD | OUTPATIENT
Start: 2018-12-27 | End: 2019-01-31

## 2018-12-27 NOTE — TELEPHONE ENCOUNTER
PHQ-9 SCORE 2/13/2018 7/16/2018 9/20/2018   PHQ-9 Total Score - - -   PHQ-9 Total Score 13 7 4

## 2018-12-28 ENCOUNTER — MYC REFILL (OUTPATIENT)
Dept: FAMILY MEDICINE | Facility: CLINIC | Age: 53
End: 2018-12-28

## 2018-12-28 DIAGNOSIS — M48.061 SPINAL STENOSIS OF LUMBAR REGION, UNSPECIFIED WHETHER NEUROGENIC CLAUDICATION PRESENT: ICD-10-CM

## 2018-12-28 DIAGNOSIS — G35 MS (MULTIPLE SCLEROSIS) (H): ICD-10-CM

## 2018-12-28 DIAGNOSIS — M54.16 LUMBAR RADICULOPATHY: ICD-10-CM

## 2018-12-28 DIAGNOSIS — G89.4 CHRONIC PAIN SYNDROME: Chronic | ICD-10-CM

## 2018-12-28 RX ORDER — OXYCODONE HYDROCHLORIDE 15 MG/1
7.5-15 TABLET ORAL EVERY 4 HOURS PRN
Qty: 122 TABLET | Refills: 0 | Status: SHIPPED | OUTPATIENT
Start: 2019-01-02 | End: 2019-01-28

## 2018-12-28 NOTE — TELEPHONE ENCOUNTER
Controlled Substance Refill Request for oxyCODONE IR (ROXICODONE) 15 MG tablet  Problem List Complete:  Yes    Last Written Prescription Date:  12.3.18  Last Fill Quantity: 122,   # refills: 0    Last Office Visit with [facility] primary care provider: 9.19.18    Clinic visit frequency required: -     Future Office visit:     Controlled substance agreement on file: Yes:  Date -.     Processing:  Fax Rx to listed pharmacy   checked in past 3 months?  Yes 11.3.17      Controlled substance agreement signed

## 2018-12-28 NOTE — TELEPHONE ENCOUNTER
Routing refill request to provider for review/approval because:  Drug not on the [facility] refill protocol   Cheryl Garner RN  [place] Triage

## 2019-01-02 ENCOUNTER — TRANSFERRED RECORDS (OUTPATIENT)
Dept: HEALTH INFORMATION MANAGEMENT | Facility: CLINIC | Age: 54
End: 2019-01-02

## 2019-01-22 DIAGNOSIS — I10 HYPERTENSION GOAL BP (BLOOD PRESSURE) < 140/90: ICD-10-CM

## 2019-01-22 RX ORDER — LOSARTAN POTASSIUM AND HYDROCHLOROTHIAZIDE 25; 100 MG/1; MG/1
TABLET ORAL
Qty: 30 TABLET | Refills: 0 | Status: ON HOLD | OUTPATIENT
Start: 2019-01-22 | End: 2019-02-15

## 2019-01-22 NOTE — TELEPHONE ENCOUNTER
Prescription approved per [facility] Refill Protocol.  30 day supply given. Per last office note was due for medication check office visit in December 2018.  Pharmacy advised.      LILLIANA Deng, RN, N  [facility] [phone]

## 2019-01-22 NOTE — TELEPHONE ENCOUNTER
"  Requested Prescriptions   Pending Prescriptions Disp Refills     losartan-hydrochlorothiazide (HYZAAR) 100-25 MG tablet [Pharmacy Med Name: LOSARTAN/HCTZ 100-25MG TAB] 90 tablet 3        Last Written Prescription Date:  1.9.18  Last Fill Quantity: 90,  # refills: 3   Last Office Visit: 9/19/2018   Future Office Visit:    Next 5 appointments (look out 90 days)    Feb 04, 2019  2:40 PM CST  [facility] [address] [place] [phone]          Sig: TAKE ONE TABLET BY MOUTH ONCE DAILY    Angiotensin-II Receptors Passed - 1/22/2019 12:24 PM       Passed - Blood pressure under 140/90 in past 12 months    BP Readings from Last 3 Encounters:   09/19/18 134/80   07/16/18 124/70   06/13/18 124/64                Passed - Recent (12 mo) or future (30 days) visit within the authorizing provider's specialty    Patient had office visit in the last 12 months or has a visit in the next 30 days with authorizing provider or within the authorizing provider's specialty.  See \"Patient Info\" tab in inbasket, or \"Choose Columns\" in Meds & Orders section of the refill encounter.             Passed - Medication is active on med list       Passed - Patient is age 18 or older       Passed - No active pregnancy on record       Passed - Normal serum creatinine on file in past 12 months    Recent Labs   Lab Test 06/20/18  1529   CR 0.52            Passed - Normal serum potassium on file in past 12 months    Recent Labs   Lab Test 06/20/18  1529   POTASSIUM 3.8                   Passed - No positive pregnancy test in past 12 months          "

## 2019-01-28 ENCOUNTER — MYC MEDICAL ADVICE (OUTPATIENT)
Dept: FAMILY MEDICINE | Facility: CLINIC | Age: 54
End: 2019-01-28

## 2019-01-28 DIAGNOSIS — G89.4 CHRONIC PAIN SYNDROME: Chronic | ICD-10-CM

## 2019-01-28 DIAGNOSIS — G35 MS (MULTIPLE SCLEROSIS) (H): ICD-10-CM

## 2019-01-28 DIAGNOSIS — M48.061 SPINAL STENOSIS OF LUMBAR REGION, UNSPECIFIED WHETHER NEUROGENIC CLAUDICATION PRESENT: ICD-10-CM

## 2019-01-28 DIAGNOSIS — M54.16 LUMBAR RADICULOPATHY: ICD-10-CM

## 2019-01-28 RX ORDER — OXYCODONE HYDROCHLORIDE 15 MG/1
7.5-15 TABLET ORAL EVERY 4 HOURS PRN
Qty: 120 TABLET | Refills: 0 | Status: ON HOLD | OUTPATIENT
Start: 2019-02-01 | End: 2019-02-15

## 2019-01-28 NOTE — TELEPHONE ENCOUNTER
Controlled Substance Refill Request for Oxycodone  Problem List Complete:    Yes    Last Written Prescription Date:  1/2/2019  Last Fill Quantity: 122,   # refills: 0    Last Office Visit with [facility] primary care provider: 9/19/2018    Future Office visit:   Next 5 appointments (look out 90 days)    Feb 04, 2019  2:40 PM CST  Office Visit with [facility] MD  [facility] [address] [place] [id number]  [phone]          Controlled substance agreement: [unfilled]    Last Urine Drug Screen:   Pain Drug SCR UR W RPTD Meds   Date Value Ref Range Status   06/20/2018 FINAL  Final     Comment:     (Note)  ====================================================================  [facility] COMP DRUG ANALYSIS,UR  ====================================================================  Test                             Result       Flag       Units        Drug Present and Declared for Prescription Verification   Oxycodone                      1698         EXPECTED   ng/mg creat   Oxymorphone                    3971         EXPECTED   ng/mg creat   Noroxycodone                   4522         EXPECTED   ng/mg creat   Noroxymorphone                 1802         EXPECTED   ng/mg creat    Sources of oxycodone are scheduled prescription medications.    Oxymorphone, noroxycodone, and noroxymorphone are expected    metabolites of oxycodone. Oxymorphone is also available as a    scheduled prescription medication.   Gabapentin                     PRESENT      EXPECTED                 Baclofen                       PRESENT      EXPECTED                 Amitriptyline                  PRESENT      EXPECTED                 Nortriptyline                  PRESENT      EXPECTED                  Nortriptyline is an expected metabolite of amitriptyline.   Duloxetine                     PRESENT      EXPECTED                Drug Present not Declared for Prescription Verification    Metoprolol                     PRESENT      UNEXPECTED              ====================================================================  Test                      Result    Flag   Units      Ref Range        Creatinine              65               mg/dL      >=20            ====================================================================  Declared Medications:  The flagging and interpretation on this report are based on the  following declared medications.  Unexpected results may arise from  inaccuracies in the declared medications.  **Note: The testing scope of this panel includes these medications:  Amitriptyline (Elavil)  Baclofen  Duloxetine (Cymbalta)  Gabapentin  Oxycodone  ====================================================================  For clinical consultation, please call [phone].  ====================================================================  Analysis performed by [employer] [place] [address]     , No results found for: COMDAT, No results found for: THC13, PCP13, COC13, MAMP13, OPI13, AMP13, BZO13, TCA13, MTD13, BAR13, OXY13, PPX13, BUP13     Processing:  Patient will  in clinic    [email or web address]   checked in past 3 months?  Yes 1/28/2019, last fill was 12/31/2018 for #120       See mychart note. Patient is asking for #120 as insurance will not cover #122.      Chandrika Nathan, LILLIANA, RN, PHN  [facility]) [phone]

## 2019-01-30 ENCOUNTER — APPOINTMENT (OUTPATIENT)
Dept: CT IMAGING | Facility: CLINIC | Age: 54
DRG: 853 | End: 2019-01-30
Payer: COMMERCIAL

## 2019-01-30 ENCOUNTER — ANESTHESIA (OUTPATIENT)
Dept: SURGERY | Facility: CLINIC | Age: 54
DRG: 853 | End: 2019-01-30
Payer: COMMERCIAL

## 2019-01-30 ENCOUNTER — APPOINTMENT (OUTPATIENT)
Dept: GENERAL RADIOLOGY | Facility: CLINIC | Age: 54
DRG: 853 | End: 2019-01-30
Payer: COMMERCIAL

## 2019-01-30 ENCOUNTER — TELEPHONE (OUTPATIENT)
Dept: FAMILY MEDICINE | Facility: CLINIC | Age: 54
End: 2019-01-30

## 2019-01-30 ENCOUNTER — HOSPITAL ENCOUNTER (INPATIENT)
Facility: CLINIC | Age: 54
LOS: 28 days | Discharge: NURSING, REHAB OR LONG-TERM CARE | DRG: 853 | End: 2019-02-27
Attending: PHYSICIAN ASSISTANT | Admitting: INTERNAL MEDICINE
Payer: COMMERCIAL

## 2019-01-30 ENCOUNTER — ANESTHESIA EVENT (OUTPATIENT)
Dept: SURGERY | Facility: CLINIC | Age: 54
DRG: 853 | End: 2019-01-30
Payer: COMMERCIAL

## 2019-01-30 DIAGNOSIS — I10 HYPERTENSION GOAL BP (BLOOD PRESSURE) < 140/90: ICD-10-CM

## 2019-01-30 DIAGNOSIS — N13.2 HYDRONEPHROSIS WITH URINARY OBSTRUCTION DUE TO URETERAL CALCULUS: ICD-10-CM

## 2019-01-30 DIAGNOSIS — M79.604 CHRONIC PAIN OF BOTH LOWER EXTREMITIES: ICD-10-CM

## 2019-01-30 DIAGNOSIS — R41.82 ALTERED MENTAL STATUS: ICD-10-CM

## 2019-01-30 DIAGNOSIS — R65.20 SEVERE SEPSIS (H): ICD-10-CM

## 2019-01-30 DIAGNOSIS — G89.4 CHRONIC PAIN SYNDROME: Chronic | ICD-10-CM

## 2019-01-30 DIAGNOSIS — G89.29 CHRONIC PAIN OF BOTH LOWER EXTREMITIES: ICD-10-CM

## 2019-01-30 DIAGNOSIS — N17.9 AKI (ACUTE KIDNEY INJURY) (H): ICD-10-CM

## 2019-01-30 DIAGNOSIS — G35 MS (MULTIPLE SCLEROSIS) (H): ICD-10-CM

## 2019-01-30 DIAGNOSIS — A41.9 SEVERE SEPSIS (H): ICD-10-CM

## 2019-01-30 DIAGNOSIS — J96.02 ACUTE HYPERCAPNIC RESPIRATORY FAILURE (H): Primary | ICD-10-CM

## 2019-01-30 DIAGNOSIS — R06.89 RESPIRATORY INSUFFICIENCY: ICD-10-CM

## 2019-01-30 DIAGNOSIS — M48.061 SPINAL STENOSIS OF LUMBAR REGION, UNSPECIFIED WHETHER NEUROGENIC CLAUDICATION PRESENT: ICD-10-CM

## 2019-01-30 DIAGNOSIS — M79.605 CHRONIC PAIN OF BOTH LOWER EXTREMITIES: ICD-10-CM

## 2019-01-30 DIAGNOSIS — M54.16 LUMBAR RADICULOPATHY: ICD-10-CM

## 2019-01-30 DIAGNOSIS — G93.49 INFECTIOUS ENCEPHALOPATHY: ICD-10-CM

## 2019-01-30 DIAGNOSIS — S72.001A CLOSED FRACTURE OF NECK OF RIGHT FEMUR, INITIAL ENCOUNTER (H): ICD-10-CM

## 2019-01-30 DIAGNOSIS — B99.9 INFECTIOUS ENCEPHALOPATHY: ICD-10-CM

## 2019-01-30 LAB
ALBUMIN SERPL-MCNC: 2.1 G/DL (ref 3.4–5)
ALBUMIN UR-MCNC: 100 MG/DL
ALP SERPL-CCNC: 163 U/L (ref 40–150)
ALT SERPL W P-5'-P-CCNC: 89 U/L (ref 0–50)
ANION GAP SERPL CALCULATED.3IONS-SCNC: 12 MMOL/L (ref 3–14)
ANISOCYTOSIS BLD QL SMEAR: SLIGHT
APPEARANCE UR: ABNORMAL
AST SERPL W P-5'-P-CCNC: 221 U/L (ref 0–45)
BACTERIA #/AREA URNS HPF: ABNORMAL /HPF
BASE DEFICIT BLDA-SCNC: 1.8 MMOL/L
BASE DEFICIT BLDA-SCNC: 2.3 MMOL/L
BASE EXCESS BLDV CALC-SCNC: 1.9 MMOL/L
BASOPHILS # BLD AUTO: 0 10E9/L (ref 0–0.2)
BASOPHILS NFR BLD AUTO: 0 %
BILIRUB DIRECT SERPL-MCNC: 0.5 MG/DL (ref 0–0.2)
BILIRUB SERPL-MCNC: 0.9 MG/DL (ref 0.2–1.3)
BILIRUB UR QL STRIP: NEGATIVE
BUN SERPL-MCNC: 49 MG/DL (ref 7–30)
CALCIUM SERPL-MCNC: 7.6 MG/DL (ref 8.5–10.1)
CHLORIDE SERPL-SCNC: 93 MMOL/L (ref 94–109)
CO2 SERPL-SCNC: 25 MMOL/L (ref 20–32)
COLOR UR AUTO: ABNORMAL
CREAT SERPL-MCNC: 2.26 MG/DL (ref 0.52–1.04)
DIFFERENTIAL METHOD BLD: ABNORMAL
EOSINOPHIL # BLD AUTO: 0 10E9/L (ref 0–0.7)
EOSINOPHIL NFR BLD AUTO: 0 %
ERYTHROCYTE [DISTWIDTH] IN BLOOD BY AUTOMATED COUNT: 17.3 % (ref 10–15)
GFR SERPL CREATININE-BSD FRML MDRD: 24 ML/MIN/{1.73_M2}
GLUCOSE BLDC GLUCOMTR-MCNC: 98 MG/DL (ref 70–99)
GLUCOSE SERPL-MCNC: 107 MG/DL (ref 70–99)
GLUCOSE UR STRIP-MCNC: NEGATIVE MG/DL
HBA1C MFR BLD: 6.4 % (ref 0–5.6)
HCO3 BLD-SCNC: 25 MMOL/L (ref 21–28)
HCO3 BLD-SCNC: 26 MMOL/L (ref 21–28)
HCO3 BLDV-SCNC: 30 MMOL/L (ref 21–28)
HCT VFR BLD AUTO: 40.3 % (ref 35–47)
HGB BLD-MCNC: 12.9 G/DL (ref 11.7–15.7)
HGB UR QL STRIP: ABNORMAL
KETONES UR STRIP-MCNC: NEGATIVE MG/DL
LACTATE BLD-SCNC: 1.2 MMOL/L (ref 0.7–2)
LEUKOCYTE ESTERASE UR QL STRIP: ABNORMAL
LYMPHOCYTES # BLD AUTO: 1.4 10E9/L (ref 0.8–5.3)
LYMPHOCYTES NFR BLD AUTO: 3 %
MCH RBC QN AUTO: 25.9 PG (ref 26.5–33)
MCHC RBC AUTO-ENTMCNC: 32 G/DL (ref 31.5–36.5)
MCV RBC AUTO: 81 FL (ref 78–100)
METAMYELOCYTES # BLD: 1.4 10E9/L
METAMYELOCYTES NFR BLD MANUAL: 3 %
MONOCYTES # BLD AUTO: 3.3 10E9/L (ref 0–1.3)
MONOCYTES NFR BLD AUTO: 7 %
MUCOUS THREADS #/AREA URNS LPF: PRESENT /LPF
NEUTROPHILS # BLD AUTO: 41.5 10E9/L (ref 1.6–8.3)
NEUTROPHILS NFR BLD AUTO: 87 %
NITRATE UR QL: NEGATIVE
O2/TOTAL GAS SETTING VFR VENT: ABNORMAL %
OXYHGB MFR BLD: 90 % (ref 92–100)
OXYHGB MFR BLD: 96 % (ref 92–100)
OXYHGB MFR BLDV: 86 %
PCO2 BLD: 52 MM HG (ref 35–45)
PCO2 BLD: 55 MM HG (ref 35–45)
PCO2 BLDV: 62 MM HG (ref 40–50)
PH BLD: 7.28 PH (ref 7.35–7.45)
PH BLD: 7.29 PH (ref 7.35–7.45)
PH BLDV: 7.29 PH (ref 7.32–7.43)
PH UR STRIP: 5 PH (ref 5–7)
PLATELET # BLD AUTO: 203 10E9/L (ref 150–450)
PLATELET # BLD EST: ABNORMAL 10*3/UL
PO2 BLD: 108 MM HG (ref 80–105)
PO2 BLD: 75 MM HG (ref 80–105)
PO2 BLDV: 57 MM HG (ref 25–47)
POTASSIUM SERPL-SCNC: 4 MMOL/L (ref 3.4–5.3)
PROT SERPL-MCNC: 7.5 G/DL (ref 6.8–8.8)
RBC # BLD AUTO: 4.99 10E12/L (ref 3.8–5.2)
RBC #/AREA URNS AUTO: 49 /HPF (ref 0–2)
SODIUM SERPL-SCNC: 130 MMOL/L (ref 133–144)
SOURCE: ABNORMAL
SP GR UR STRIP: 1.02 (ref 1–1.03)
TSH SERPL DL<=0.005 MIU/L-ACNC: 5.08 MU/L (ref 0.4–4)
UROBILINOGEN UR STRIP-MCNC: 2 MG/DL (ref 0–2)
WBC # BLD AUTO: 47.7 10E9/L (ref 4–11)
WBC #/AREA URNS AUTO: 104 /HPF (ref 0–5)

## 2019-01-30 PROCEDURE — 82805 BLOOD GASES W/O2 SATURATION: CPT | Performed by: INTERNAL MEDICINE

## 2019-01-30 PROCEDURE — 31500 INSERT EMERGENCY AIRWAY: CPT

## 2019-01-30 PROCEDURE — 40000275 ZZH STATISTIC RCP TIME EA 10 MIN

## 2019-01-30 PROCEDURE — 37000009 ZZH ANESTHESIA TECHNICAL FEE, EACH ADDTL 15 MIN: Performed by: UROLOGY

## 2019-01-30 PROCEDURE — 87186 SC STD MICRODIL/AGAR DIL: CPT | Performed by: INTERNAL MEDICINE

## 2019-01-30 PROCEDURE — 40000986 XR ABDOMEN 1 VW

## 2019-01-30 PROCEDURE — 71046 X-RAY EXAM CHEST 2 VIEWS: CPT

## 2019-01-30 PROCEDURE — 87070 CULTURE OTHR SPECIMN AEROBIC: CPT | Performed by: INTERNAL MEDICINE

## 2019-01-30 PROCEDURE — 25000128 H RX IP 250 OP 636: Performed by: PHYSICIAN ASSISTANT

## 2019-01-30 PROCEDURE — 87077 CULTURE AEROBIC IDENTIFY: CPT | Performed by: INTERNAL MEDICINE

## 2019-01-30 PROCEDURE — 87077 CULTURE AEROBIC IDENTIFY: CPT | Performed by: PHYSICIAN ASSISTANT

## 2019-01-30 PROCEDURE — 36600 WITHDRAWAL OF ARTERIAL BLOOD: CPT

## 2019-01-30 PROCEDURE — 00000146 ZZHCL STATISTIC GLUCOSE BY METER IP

## 2019-01-30 PROCEDURE — 87800 DETECT AGNT MULT DNA DIREC: CPT | Performed by: PHYSICIAN ASSISTANT

## 2019-01-30 PROCEDURE — 36415 COLL VENOUS BLD VENIPUNCTURE: CPT | Performed by: PHYSICIAN ASSISTANT

## 2019-01-30 PROCEDURE — 36556 INSERT NON-TUNNEL CV CATH: CPT

## 2019-01-30 PROCEDURE — 25000125 ZZHC RX 250: Performed by: UROLOGY

## 2019-01-30 PROCEDURE — XW043H4 INTRODUCTION OF SYNTHETIC HUMAN ANGIOTENSIN II INTO CENTRAL VEIN, PERCUTANEOUS APPROACH, NEW TECHNOLOGY GROUP 4: ICD-10-PCS | Performed by: EMERGENCY MEDICINE

## 2019-01-30 PROCEDURE — 5A1955Z RESPIRATORY VENTILATION, GREATER THAN 96 CONSECUTIVE HOURS: ICD-10-PCS | Performed by: PHYSICIAN ASSISTANT

## 2019-01-30 PROCEDURE — 96365 THER/PROPH/DIAG IV INF INIT: CPT

## 2019-01-30 PROCEDURE — 37000008 ZZH ANESTHESIA TECHNICAL FEE, 1ST 30 MIN: Performed by: UROLOGY

## 2019-01-30 PROCEDURE — 87641 MR-STAPH DNA AMP PROBE: CPT | Performed by: INTERNAL MEDICINE

## 2019-01-30 PROCEDURE — 25000125 ZZHC RX 250: Performed by: NURSE ANESTHETIST, CERTIFIED REGISTERED

## 2019-01-30 PROCEDURE — 25000128 H RX IP 250 OP 636: Performed by: NURSE ANESTHETIST, CERTIFIED REGISTERED

## 2019-01-30 PROCEDURE — 87640 STAPH A DNA AMP PROBE: CPT | Performed by: INTERNAL MEDICINE

## 2019-01-30 PROCEDURE — 76937 US GUIDE VASCULAR ACCESS: CPT

## 2019-01-30 PROCEDURE — 40000277 XR SURGERY CARM FLUORO LESS THAN 5 MIN W STILLS: Mod: TC

## 2019-01-30 PROCEDURE — 93005 ELECTROCARDIOGRAM TRACING: CPT

## 2019-01-30 PROCEDURE — 96368 THER/DIAG CONCURRENT INF: CPT

## 2019-01-30 PROCEDURE — 87086 URINE CULTURE/COLONY COUNT: CPT | Performed by: UROLOGY

## 2019-01-30 PROCEDURE — 83036 HEMOGLOBIN GLYCOSYLATED A1C: CPT | Performed by: PHYSICIAN ASSISTANT

## 2019-01-30 PROCEDURE — 20000003 ZZH R&B ICU

## 2019-01-30 PROCEDURE — BT1F1ZZ FLUOROSCOPY OF LEFT KIDNEY, URETER AND BLADDER USING LOW OSMOLAR CONTRAST: ICD-10-PCS | Performed by: UROLOGY

## 2019-01-30 PROCEDURE — 52332 CYSTOSCOPY AND TREATMENT: CPT | Mod: LT | Performed by: UROLOGY

## 2019-01-30 PROCEDURE — 25000128 H RX IP 250 OP 636

## 2019-01-30 PROCEDURE — 82805 BLOOD GASES W/O2 SATURATION: CPT | Performed by: EMERGENCY MEDICINE

## 2019-01-30 PROCEDURE — 27210794 ZZH OR GENERAL SUPPLY STERILE: Performed by: UROLOGY

## 2019-01-30 PROCEDURE — 83605 ASSAY OF LACTIC ACID: CPT | Performed by: PHYSICIAN ASSISTANT

## 2019-01-30 PROCEDURE — 81001 URINALYSIS AUTO W/SCOPE: CPT | Performed by: PHYSICIAN ASSISTANT

## 2019-01-30 PROCEDURE — 25000128 H RX IP 250 OP 636: Performed by: INTERNAL MEDICINE

## 2019-01-30 PROCEDURE — 94660 CPAP INITIATION&MGMT: CPT

## 2019-01-30 PROCEDURE — 99285 EMERGENCY DEPT VISIT HI MDM: CPT | Mod: 25

## 2019-01-30 PROCEDURE — 25000128 H RX IP 250 OP 636: Performed by: UROLOGY

## 2019-01-30 PROCEDURE — 0T778DZ DILATION OF LEFT URETER WITH INTRALUMINAL DEVICE, VIA NATURAL OR ARTIFICIAL OPENING ENDOSCOPIC: ICD-10-PCS | Performed by: UROLOGY

## 2019-01-30 PROCEDURE — 40000986 XR CHEST PORT 1 VW

## 2019-01-30 PROCEDURE — 87040 BLOOD CULTURE FOR BACTERIA: CPT | Performed by: PHYSICIAN ASSISTANT

## 2019-01-30 PROCEDURE — 87186 SC STD MICRODIL/AGAR DIL: CPT | Performed by: PHYSICIAN ASSISTANT

## 2019-01-30 PROCEDURE — 36000060 ZZH SURGERY LEVEL 3 W FLUORO 1ST 30 MIN: Performed by: UROLOGY

## 2019-01-30 PROCEDURE — 36000058 ZZH SURGERY LEVEL 3 EA 15 ADDTL MIN: Performed by: UROLOGY

## 2019-01-30 PROCEDURE — 74176 CT ABD & PELVIS W/O CONTRAST: CPT

## 2019-01-30 PROCEDURE — 80048 BASIC METABOLIC PNL TOTAL CA: CPT | Performed by: PHYSICIAN ASSISTANT

## 2019-01-30 PROCEDURE — 25000125 ZZHC RX 250: Performed by: INTERNAL MEDICINE

## 2019-01-30 PROCEDURE — 87205 SMEAR GRAM STAIN: CPT | Performed by: INTERNAL MEDICINE

## 2019-01-30 PROCEDURE — 94002 VENT MGMT INPAT INIT DAY: CPT

## 2019-01-30 PROCEDURE — 80076 HEPATIC FUNCTION PANEL: CPT | Performed by: PHYSICIAN ASSISTANT

## 2019-01-30 PROCEDURE — 99291 CRITICAL CARE FIRST HOUR: CPT | Performed by: INTERNAL MEDICINE

## 2019-01-30 PROCEDURE — 84443 ASSAY THYROID STIM HORMONE: CPT | Performed by: PHYSICIAN ASSISTANT

## 2019-01-30 PROCEDURE — 25000125 ZZHC RX 250: Performed by: EMERGENCY MEDICINE

## 2019-01-30 PROCEDURE — 70450 CT HEAD/BRAIN W/O DYE: CPT

## 2019-01-30 PROCEDURE — C2617 STENT, NON-COR, TEM W/O DEL: HCPCS | Performed by: UROLOGY

## 2019-01-30 PROCEDURE — 25000128 H RX IP 250 OP 636: Performed by: EMERGENCY MEDICINE

## 2019-01-30 PROCEDURE — 96367 TX/PROPH/DG ADDL SEQ IV INF: CPT

## 2019-01-30 PROCEDURE — 40000276 ZZH STATISTIC RCP TIME ED VENT EA 10 MIN

## 2019-01-30 PROCEDURE — 85025 COMPLETE CBC W/AUTO DIFF WBC: CPT | Performed by: PHYSICIAN ASSISTANT

## 2019-01-30 PROCEDURE — 74420 UROGRAPHY RTRGR +-KUB: CPT | Mod: 26 | Performed by: UROLOGY

## 2019-01-30 PROCEDURE — 96375 TX/PRO/DX INJ NEW DRUG ADDON: CPT

## 2019-01-30 PROCEDURE — 99222 1ST HOSP IP/OBS MODERATE 55: CPT | Mod: 25 | Performed by: UROLOGY

## 2019-01-30 DEVICE — STENT URETERAL POLARIS ULTRA 6FRX26CM M0061921330
Type: IMPLANTABLE DEVICE | Site: URETER | Status: NON-FUNCTIONAL
Removed: 2019-03-15

## 2019-01-30 RX ORDER — NALOXONE HYDROCHLORIDE 0.4 MG/ML
.1-.4 INJECTION, SOLUTION INTRAMUSCULAR; INTRAVENOUS; SUBCUTANEOUS
Status: DISCONTINUED | OUTPATIENT
Start: 2019-01-30 | End: 2019-01-30

## 2019-01-30 RX ORDER — FENTANYL CITRATE 50 UG/ML
25 INJECTION, SOLUTION INTRAMUSCULAR; INTRAVENOUS
Status: DISCONTINUED | OUTPATIENT
Start: 2019-01-30 | End: 2019-02-09

## 2019-01-30 RX ORDER — SODIUM CHLORIDE, SODIUM LACTATE, POTASSIUM CHLORIDE, CALCIUM CHLORIDE 600; 310; 30; 20 MG/100ML; MG/100ML; MG/100ML; MG/100ML
INJECTION, SOLUTION INTRAVENOUS CONTINUOUS PRN
Status: DISCONTINUED | OUTPATIENT
Start: 2019-01-30 | End: 2019-01-30

## 2019-01-30 RX ORDER — AMPICILLIN AND SULBACTAM 2; 1 G/1; G/1
3 INJECTION, POWDER, FOR SOLUTION INTRAMUSCULAR; INTRAVENOUS ONCE
Status: COMPLETED | OUTPATIENT
Start: 2019-01-30 | End: 2019-01-30

## 2019-01-30 RX ORDER — OXYCODONE HYDROCHLORIDE 15 MG/1
7.5-15 TABLET ORAL
Status: DISCONTINUED | OUTPATIENT
Start: 2019-01-31 | End: 2019-01-31 | Stop reason: ALTCHOICE

## 2019-01-30 RX ORDER — SODIUM CHLORIDE, SODIUM LACTATE, POTASSIUM CHLORIDE, CALCIUM CHLORIDE 600; 310; 30; 20 MG/100ML; MG/100ML; MG/100ML; MG/100ML
INJECTION, SOLUTION INTRAVENOUS CONTINUOUS
Status: DISCONTINUED | OUTPATIENT
Start: 2019-01-30 | End: 2019-02-02

## 2019-01-30 RX ORDER — DEXTROSE MONOHYDRATE 25 G/50ML
25-50 INJECTION, SOLUTION INTRAVENOUS
Status: DISCONTINUED | OUTPATIENT
Start: 2019-01-30 | End: 2019-02-09

## 2019-01-30 RX ORDER — SODIUM CHLORIDE 9 MG/ML
INJECTION, SOLUTION INTRAVENOUS CONTINUOUS PRN
Status: DISCONTINUED | OUTPATIENT
Start: 2019-01-30 | End: 2019-01-30

## 2019-01-30 RX ORDER — HALOPERIDOL 5 MG/ML
INJECTION INTRAMUSCULAR
Status: DISCONTINUED
Start: 2019-01-30 | End: 2019-01-30 | Stop reason: WASHOUT

## 2019-01-30 RX ORDER — NALOXONE HYDROCHLORIDE 0.4 MG/ML
.1-.4 INJECTION, SOLUTION INTRAMUSCULAR; INTRAVENOUS; SUBCUTANEOUS
Status: DISCONTINUED | OUTPATIENT
Start: 2019-01-30 | End: 2019-02-27 | Stop reason: HOSPADM

## 2019-01-30 RX ORDER — NICOTINE POLACRILEX 4 MG
15-30 LOZENGE BUCCAL
Status: DISCONTINUED | OUTPATIENT
Start: 2019-01-30 | End: 2019-02-09

## 2019-01-30 RX ORDER — AMITRIPTYLINE HYDROCHLORIDE 50 MG/1
100 TABLET ORAL AT BEDTIME
Status: DISCONTINUED | OUTPATIENT
Start: 2019-01-30 | End: 2019-02-27 | Stop reason: HOSPADM

## 2019-01-30 RX ORDER — ONDANSETRON 4 MG/1
4 TABLET, ORALLY DISINTEGRATING ORAL EVERY 6 HOURS PRN
Status: DISCONTINUED | OUTPATIENT
Start: 2019-01-30 | End: 2019-02-27 | Stop reason: HOSPADM

## 2019-01-30 RX ORDER — LOSARTAN POTASSIUM AND HYDROCHLOROTHIAZIDE 25; 100 MG/1; MG/1
1 TABLET ORAL DAILY
Status: CANCELLED | OUTPATIENT
Start: 2019-01-30

## 2019-01-30 RX ORDER — GABAPENTIN 600 MG/1
600 TABLET ORAL 3 TIMES DAILY
Status: DISCONTINUED | OUTPATIENT
Start: 2019-01-30 | End: 2019-02-07

## 2019-01-30 RX ORDER — ETOMIDATE 2 MG/ML
30 INJECTION INTRAVENOUS ONCE
Status: COMPLETED | OUTPATIENT
Start: 2019-01-30 | End: 2019-01-30

## 2019-01-30 RX ORDER — ATORVASTATIN CALCIUM 10 MG/1
10 TABLET, FILM COATED ORAL DAILY
Status: DISCONTINUED | OUTPATIENT
Start: 2019-01-30 | End: 2019-02-27 | Stop reason: HOSPADM

## 2019-01-30 RX ORDER — NALOXONE HYDROCHLORIDE 0.4 MG/ML
0.4 INJECTION, SOLUTION INTRAMUSCULAR; INTRAVENOUS; SUBCUTANEOUS ONCE
Status: COMPLETED | OUTPATIENT
Start: 2019-01-30 | End: 2019-01-30

## 2019-01-30 RX ORDER — ASPIRIN 81 MG/1
162 TABLET, CHEWABLE ORAL DAILY
Status: DISCONTINUED | OUTPATIENT
Start: 2019-01-30 | End: 2019-02-27 | Stop reason: HOSPADM

## 2019-01-30 RX ORDER — FENTANYL CITRATE 50 UG/ML
100 INJECTION, SOLUTION INTRAMUSCULAR; INTRAVENOUS ONCE
Status: COMPLETED | OUTPATIENT
Start: 2019-01-30 | End: 2019-01-30

## 2019-01-30 RX ORDER — METOPROLOL SUCCINATE 25 MG/1
50 TABLET, EXTENDED RELEASE ORAL DAILY
Status: DISCONTINUED | OUTPATIENT
Start: 2019-01-30 | End: 2019-01-31

## 2019-01-30 RX ORDER — PROPOFOL 10 MG/ML
INJECTION, EMULSION INTRAVENOUS
Status: COMPLETED
Start: 2019-01-30 | End: 2019-01-30

## 2019-01-30 RX ORDER — ONDANSETRON 2 MG/ML
4 INJECTION INTRAMUSCULAR; INTRAVENOUS EVERY 6 HOURS PRN
Status: DISCONTINUED | OUTPATIENT
Start: 2019-01-30 | End: 2019-02-27 | Stop reason: HOSPADM

## 2019-01-30 RX ORDER — PROPOFOL 10 MG/ML
5-75 INJECTION, EMULSION INTRAVENOUS CONTINUOUS
Status: DISCONTINUED | OUTPATIENT
Start: 2019-01-30 | End: 2019-01-31

## 2019-01-30 RX ORDER — BACLOFEN 10 MG/1
10 TABLET ORAL 3 TIMES DAILY
Status: DISCONTINUED | OUTPATIENT
Start: 2019-01-30 | End: 2019-02-27 | Stop reason: HOSPADM

## 2019-01-30 RX ORDER — HALOPERIDOL 5 MG/ML
2 INJECTION INTRAMUSCULAR ONCE
Status: COMPLETED | OUTPATIENT
Start: 2019-01-30 | End: 2019-01-30

## 2019-01-30 RX ORDER — NOREPINEPHRINE BITARTRATE/D5W 16MG/250ML
.03-.4 PLASTIC BAG, INJECTION (ML) INTRAVENOUS CONTINUOUS
Status: DISCONTINUED | OUTPATIENT
Start: 2019-01-30 | End: 2019-01-31

## 2019-01-30 RX ORDER — DULOXETIN HYDROCHLORIDE 30 MG/1
60 CAPSULE, DELAYED RELEASE ORAL 2 TIMES DAILY
Status: DISCONTINUED | OUTPATIENT
Start: 2019-01-30 | End: 2019-01-31

## 2019-01-30 RX ORDER — CEFTRIAXONE 1 G/1
1 INJECTION, POWDER, FOR SOLUTION INTRAMUSCULAR; INTRAVENOUS ONCE
Status: COMPLETED | OUTPATIENT
Start: 2019-01-30 | End: 2019-01-30

## 2019-01-30 RX ADMIN — AMPICILLIN SODIUM AND SULBACTAM SODIUM 3 G: 2; 1 INJECTION, POWDER, FOR SOLUTION INTRAMUSCULAR; INTRAVENOUS at 17:56

## 2019-01-30 RX ADMIN — NALOXONE HYDROCHLORIDE 0.2 MG: 0.4 INJECTION, SOLUTION INTRAMUSCULAR; INTRAVENOUS; SUBCUTANEOUS at 12:20

## 2019-01-30 RX ADMIN — PHENYLEPHRINE HYDROCHLORIDE 200 MCG: 10 INJECTION, SOLUTION INTRAMUSCULAR; INTRAVENOUS; SUBCUTANEOUS at 20:10

## 2019-01-30 RX ADMIN — TAZOBACTAM SODIUM AND PIPERACILLIN SODIUM 3.38 G: 375; 3 INJECTION, SOLUTION INTRAVENOUS at 21:53

## 2019-01-30 RX ADMIN — SODIUM CHLORIDE, POTASSIUM CHLORIDE, SODIUM LACTATE AND CALCIUM CHLORIDE: 600; 310; 30; 20 INJECTION, SOLUTION INTRAVENOUS at 21:36

## 2019-01-30 RX ADMIN — SODIUM CHLORIDE, POTASSIUM CHLORIDE, SODIUM LACTATE AND CALCIUM CHLORIDE: 600; 310; 30; 20 INJECTION, SOLUTION INTRAVENOUS at 20:21

## 2019-01-30 RX ADMIN — SODIUM CHLORIDE: 9 INJECTION, SOLUTION INTRAVENOUS at 19:30

## 2019-01-30 RX ADMIN — MIDAZOLAM HYDROCHLORIDE 2 MG: 1 INJECTION, SOLUTION INTRAMUSCULAR; INTRAVENOUS at 19:30

## 2019-01-30 RX ADMIN — AZITHROMYCIN MONOHYDRATE 500 MG: 500 INJECTION, POWDER, LYOPHILIZED, FOR SOLUTION INTRAVENOUS at 17:50

## 2019-01-30 RX ADMIN — PROPOFOL 5 MCG/KG/MIN: 10 INJECTION, EMULSION INTRAVENOUS at 21:15

## 2019-01-30 RX ADMIN — MIDAZOLAM HYDROCHLORIDE 2 MG: 1 INJECTION, SOLUTION INTRAMUSCULAR; INTRAVENOUS at 19:27

## 2019-01-30 RX ADMIN — NOREPINEPHRINE BITARTRATE 0.03 MCG/KG/MIN: 1 INJECTION INTRAVENOUS at 21:35

## 2019-01-30 RX ADMIN — SODIUM CHLORIDE 1000 ML: 9 INJECTION, SOLUTION INTRAVENOUS at 17:50

## 2019-01-30 RX ADMIN — PHENYLEPHRINE HYDROCHLORIDE 200 MCG: 10 INJECTION, SOLUTION INTRAMUSCULAR; INTRAVENOUS; SUBCUTANEOUS at 20:13

## 2019-01-30 RX ADMIN — HALOPERIDOL LACTATE 2 MG: 5 INJECTION, SOLUTION INTRAMUSCULAR at 16:10

## 2019-01-30 RX ADMIN — SUCCINYLCHOLINE CHLORIDE 150 MG: 20 INJECTION, SOLUTION INTRAMUSCULAR; INTRAVENOUS; PARENTERAL at 19:03

## 2019-01-30 RX ADMIN — PHENYLEPHRINE HYDROCHLORIDE 200 MCG: 10 INJECTION, SOLUTION INTRAMUSCULAR; INTRAVENOUS; SUBCUTANEOUS at 20:05

## 2019-01-30 RX ADMIN — ETOMIDATE 30 MG: 20 INJECTION, SOLUTION INTRAVENOUS at 19:02

## 2019-01-30 RX ADMIN — SODIUM CHLORIDE 1000 ML: 9 INJECTION, SOLUTION INTRAVENOUS at 16:30

## 2019-01-30 RX ADMIN — FENTANYL CITRATE 100 MCG: 50 INJECTION, SOLUTION INTRAMUSCULAR; INTRAVENOUS at 19:13

## 2019-01-30 RX ADMIN — SODIUM CHLORIDE, POTASSIUM CHLORIDE, SODIUM LACTATE AND CALCIUM CHLORIDE 1000 ML: 600; 310; 30; 20 INJECTION, SOLUTION INTRAVENOUS at 18:31

## 2019-01-30 RX ADMIN — VANCOMYCIN HYDROCHLORIDE 2000 MG: 1 INJECTION, POWDER, LYOPHILIZED, FOR SOLUTION INTRAVENOUS at 23:50

## 2019-01-30 RX ADMIN — PHENYLEPHRINE HYDROCHLORIDE 200 MCG: 10 INJECTION, SOLUTION INTRAMUSCULAR; INTRAVENOUS; SUBCUTANEOUS at 20:24

## 2019-01-30 RX ADMIN — PHENYLEPHRINE HYDROCHLORIDE 200 MCG: 10 INJECTION, SOLUTION INTRAMUSCULAR; INTRAVENOUS; SUBCUTANEOUS at 19:58

## 2019-01-30 RX ADMIN — CEFTRIAXONE SODIUM 1 G: 1 INJECTION, POWDER, FOR SOLUTION INTRAMUSCULAR; INTRAVENOUS at 16:30

## 2019-01-30 RX ADMIN — ROCURONIUM BROMIDE 50 MG: 10 INJECTION INTRAVENOUS at 19:54

## 2019-01-30 NOTE — ED TRIAGE NOTES
Pt arrives via EMS from home d/t bilateral leg pain for past 3-4 days. Denies injury/trauma. Pt with chronic leg pain. Pt sating upper 80s on RA. Pt placed on 4-6 L O2 with improvement. CMS intact. Pt falling asleep while answering questions. Pt had to be sternal rubbed to sign for EMS. No IV established. ABC intact.

## 2019-01-30 NOTE — PROGRESS NOTES
Due to patients AMS was unable to follow directions and corporate leading to movement during scanning.  Patient did have restraints to help lessen the movement.

## 2019-01-30 NOTE — TELEPHONE ENCOUNTER
Fernando  of patient calling looking for guidance on where to bring patient via EMS.  Per , atient has been sick for past couple of days and is very weak.  Patient is currently sitting in a recliner.   does not want patient taken to [facility] because it is the closest hospital to them.   would like patient taken to [facility].   does not believe this is life threatening and needs patient transported due to weakness.    LILLIANA PowerN, RN  [facility]

## 2019-01-30 NOTE — LETTER
Transition Communication Hand-off for Care Transitions to Next Level of Care Provider    Name: Amanda Richardson  : 1965  MRN #: [id number]  Primary Care Provider: Julius Hassan  Primary Care MD Name: Julius Hassan  Primary Clinic: [address] [place] [id number]  Primary Care Clinic Name: [facility]  Reason for Hospitalization:  Altered mental status [R41.82]  JULIANNE (acute kidney injury) (H) [N17.9]  Closed fracture of neck of right femur, initial encounter (H) [S72.001A]  Hydronephrosis with urinary obstruction due to ureteral calculus [N13.2]  Septic shock (H) [A41.9, R65.21]  Admit Date/Time: 2019 12:06 PM  Discharge Date: 19  Payor Source: Payor: HU[employer]NA / Plan: HU[employer]NA MEDICARE ADVANTAGE / Product Type: Medicare /     Readmission Assessment Measure (JOAQUIN) Risk Score/category: AVERAGE         Reason for Communication Hand-off Referral: Fragility    Discharge Plan:       Concern for non-adherence with plan of care:   NO  Discharge Needs Assessment:  Needs      Most Recent Value   Equipment Currently Used at Home  commode, grab bar, tub/shower, lift device, shower chair, wheelchair, manual   Transitional Care  [facility] [phone]   PAS Number  -- [[id number]]          Already enrolled in Tele-monitoring program and name of program:  na  Follow-up specialty is recommended: Yes    Follow-up plan:    Future Appointments   Date Time Provider Department Center   3/1/2019  1:00 PM Gabriela Rodas, APRN CNP [facility]   3/29/2019 11:00 AM Mayito Chauhan MD [facility]       Any outstanding tests or procedures:    Procedures     Future Labs/Procedures    Oxygen - Nasal cannula     Comments:    3 Lpm by nasal cannula to keep O2 sats 92% or greater.          Referrals     Future Labs/Procedures    Occupational Therapy Adult Consult     Comments:    Evaluate and treat as clinically indicated.    Reason:  deconditioning    Physical Therapy  Adult Consult     Comments:    Evaluate and treat as clinically indicated.    Reason:  deconditioning          Follow Up and recommended labs and tests    Follow up with [facility] physician.  The following labs/tests are recommended: BMP, CBC in 1 week.   Follow up with urology in 2 weeks to discuss stent and definitive stone management.         Key Recommendations:  Pt was admitted post fall with urosepsis.  She has a history of MS.  They also incidentally found a Femur fracture, which was old and didn't require intervention.  Pt was extubated as well for her respiratory failure.  Pt was discharged to [facility] for rehab.  Pt would benefit from close follow up as she is frail.      Gabriela Soriano    AVS/Discharge Summary is the source of truth; this is a helpful guide for improved communication of patient story

## 2019-01-30 NOTE — ED NOTES
0.2 mg narcan IN   NURSING DISCHARGE NOTE    Discharged Home via Wheelchair. Accompanied by Family member and Support staff  Belongings Taken by patient/family. Pt IV dc'd with catheter intact. Tele discontinued. Pt denies CP, SOB, dizziness, or palpitations.  Pt de

## 2019-01-30 NOTE — ED PROVIDER NOTES
History     Chief Complaint:  Leg Pain    HPI     The HPI is limited due to patients due to taking narcotics prior to arrival.    Amanda Richardson is a 53 year old female with history of chronic leg pain who presents with leg pain. The patient states the pain is located to bilateral legs from hip to foot. Within the past four days she notes her pain has worsened. Here in the ED, the patient reports to taking one oxycodone prior to arrival. She notes that her primary care provider at [facility] prescribes her with oxycodone and has a pain contract with them. She denies recent fall or injury to legs.     Per , the patient was recently sick with vomiting and abdominal pain and it seemed like it was better. He reports that the patient has been Increasing weak an fatigue and was having difficulty getting up on her own. The  states the patient is not taking more pain medications than prescribed. Per , the patient has frequent urinary tract infection. The  also reports that the patient slid out of her wheel chair a few days ago.    Allergies:  Lisinopril  Cyclobenzaprine  Flexeril    Medications:    Elavil  Aspirin  Lipitor  Gabapentin  Metformin  Hyzaar  Cymbalta    Past Medical History:    Chronic pain  CKD  Leukocytosis  Depression  MS  Non Hodgkins lymphoma  DM  Spinal stenosis  T cell lymphoma    Past Surgical History:    Colonoscopy  Open reduction  Sinus lymphoma   Lumbar epidural    Family History:    CAD  Cancer  HTN  Thyroid disease    Social History:  Marital Status:   [2]  Current smoker  Negative for alcohol use.      Review of Systems   Unable to perform ROS: Mental status change       Physical Exam     Patient Vitals for the past 24 hrs:   BP Temp Temp src Pulse Heart Rate Resp SpO2   01/30/19 1726 -- -- -- -- -- -- 97 %   01/30/19 1652 90/46 -- -- -- -- -- 97 %   01/30/19 1650 90/46 -- -- -- -- -- 97 %   01/30/19 1630 -- -- -- 98 -- -- --   01/30/19 1620 97/48  -- -- -- -- -- 95 %   01/30/19 1600 99/40 -- -- 100 -- -- 94 %   01/30/19 1533 128/78 -- -- 109 109 21 96 %   01/30/19 1515 -- -- -- -- -- -- (!) 88 %   01/30/19 1514 -- -- -- -- 100 -- --   01/30/19 1506 -- -- -- -- 93 17 (!) 86 %   01/30/19 1500 -- -- -- -- 95 12 (!) 89 %   01/30/19 1457 -- -- -- -- 94 9 (!) 87 %   01/30/19 1449 -- 97  F (36.1  C) Rectal -- -- -- --   01/30/19 1221 92/81 98.7  F (37.1  C) Temporal -- 94 12 90 %         Physical Exam  Constitutional: uncomfortable appearing, but non-toxic  Head: No external signs of trauma noted to head or face.   Eyes: Pupils are equal, round, and reactive to light. Conjunctiva normal.  ENT: MMM. Mumbled speech intermittently.    Neck: Normal ROM. Non-tender.    Cardiovascular: Normal rate, regular rhythm, and intact distal pulses.    Respiratory: Effort normal. No respiratory distress. Lungs clear to auscultation bilaterally.   GI: Soft. There is no tenderness. There is no rebound.   Musculoskeletal: No deformities appreciated. Bilateral lower extremity edema noted. No focal tenderness to lower extremities bilaterally. Pain with ROM of bilateral lower extremities.   Neurological: drowsy, falls asleep during questioning, but wakes easily to voice and then answers. Moves all extremities equally. No focal deficits.   Psychiatric: Appropriate mood, affect, and behavior.   Skin: Skin is warm and dry. bilateral inguinal folds with skin break down and bleeding.       Emergency Department Course   Imaging:  XR Chest 2 views:   Two views of the chest are performed. Atelectasis is noted  at the lung bases. Lungs are otherwise clear. Heart is enlarged. This  may have increased slightly since prior exam. No obvious pneumothorax  or pleural effusions.As per radiology.     CT Head without contrast:   No acute pathology. No bleed, mass, or infarcts as per radiology.    CT Abdomen/Pelvis with IV contrast:   1. Mild left hydronephrosis due to two adjacent proximal  ureteral  stones measuring up to 1 cm.  2. Mildly displaced, impacted right femoral neck fracture, age  indeterminate, but could be recent. Correlate clinically.  3. Bilateral renal stones and cholelithiasis.  4. Mild atelectasis or infiltrate left lung base as per radiology.    Laboratory:  CBC: WBC: 47.7, HGB: 12.9, PLT: 203  BMP: Glucose 107(H), Sodium 130(L), Chloride 93(L), Creatinine 2.26(H), GFR 24(L), Calcium 7.6(L) o/w WNL     VBG: pH 7.29(L) / PCO2 62(H) / PO2 57(H) / Bicarb 30(H)    TSH:5.08(H)    UA with Microscopic: Urine Blood Moderate (A), Protein Albumin Urine 100(A), Leukocyte Esterase large(A), (H), RBC 49(H),Bacteria Many (A),Mucouse Urine Present (A) , o/w WNL    1258 Lactic acid: 1.2    Blood Cultures(x2): Pending    Interventions:  1220 Narcan 0.2mg IV  1610 Haldol 2mg IV  1630 Normal saline 1,000mL IV    Rocephin 1g IV  1750 Normal Saline 1000ml IV   Zithromax   1756 Unasyn     Please see MAR for full list of medications administered in the ED.    Emergency Department Course:  Nursing notes and vitals reviewed. (1224) I performed an exam of the patient as documented above.     IV inserted. Medicine administered as documented above. Blood drawn. This was sent to the lab for further testing, results above.    The patient provided a urine sample here in the emergency department. This was sent for laboratory testing, findings above.     The patient was sent for a XR Chest, CT Head, and Ct Abd/Pelvis while in the emergency department, findings above.     (1351) I rechecked the patient and discussed the results of her workup thus far. I spoke to the patients  at this time. Refer to HPI for more detail.     (1643) I reevaluated the patient and provided an update in regards to her ED course.      (1657) I consulted with LORRIE Ortega [facility], regarding the patient's history and presentation here in the emergency department.    (1617)  I consulted with Dr. Yarbrough of the  hospitalist services. They are in agreement to accept the patient for admission.    (5251) I consulted with Dr. Emmanuel Sapp, [facility], regarding the patient's history and presentation here in the emergency department.    Findings and plan explained to the Patient and spouse who consents to admission. Discussed the patient with Dr. Yarbrough, who will admit the patient to a ICU bed for further monitoring, evaluation, and treatment.    Impression & Plan    Medical Decision Makin year old female presenting complaining of acute on chronic leg pain. She arrives slightly altered, which seems likely related to her oxycodone use. She was also mildly hypoxic, which fit with opiate overdose as well. She initially reported bilateral chronic leg pain, but no other complaints. Exam seemed unremarkable initially.  later arrived and provided additional history and thus work-up was initiated with labs. She is afebrile and her lactate is normal, but she has a significant leukocytosis to 47 of unclear significance. She appears to have a UTI, but this seems like an extreme leukocytosis due to that. Could potentially be a myelodysplastic syndrome. Blood cultures were obtained and she was started on ceftriaxone for the UTI. She was also noted to have a significant JULIANNE with creatinine of 2.26 and fluids were given. She continued to be altered, which seemed likely related to opiates and she did initially have some response to narcan. However, she continued to be altered and did not significantly improve during ED course as I would expect if it were due to opiates as they should clear after several hours. Therefore, additional evaluation was obtained. Head CT did not reveal any acute abnormality. Without fever this does not seem consistent with meningitis. A CT abd/pelvis was also obtained, which was notable for several abnormalities. First, she was noted to have a right femoral neck fracture, acute vs subacute.   did note she slid out of her chair yesterday, but no other history of trauma. Second, she has left hydronephrosis with a proximal right ureteral stone. Additionally, atelectasis vs infiltrate in the lung base. Azithromycin and unasyn were added to cover possible pneumonia. I discussed the femoral neck fracture with ortho - who recommended likely surgical repair tomorrow. I also discussed with urology the hydronephrosis and stone with infected urine - they recommended proceeding to OR tonight pending discussion with ortho regarding positioning due to femoral neck fracture. Patient continued to be altered and vbg was obtained which showed hypercapnia, which could be contributing to her mental status as well. She was placed on bipap to see if this helped. She had slightly low BPs while in the ED, but seemed to be improving some with IVF. I discussed this patient with Dr. Dasilva and subsequently Dr. Mejias, who were in agreement with this plan as outlined above. I also spoke with Dr. Yarbrough, who accepted her for admission to the ICU. At the end of my shift, the patient was overall stable on Bipap. I signed her out to Dr. Mejias while awaiting ICU bed vs going straight to the OR with urology.     Critical Care time:  none    Diagnosis:    ICD-10-CM    1. JULIANNE (acute kidney injury) (H) N17.9 Hepatic panel     Hepatic panel     CANCELED: Hepatic panel   2. Hydronephrosis with urinary obstruction due to ureteral calculus N13.2    3. Closed fracture of neck of right femur, initial encounter (H) S72.001A    4. Altered mental status R41.82        Disposition:  Admitted to Dr. Yarbrough    Scribe Disclosure:  I, María Denis, am serving as a scribe on 1/30/2019 at 12:24 PM to personally document services performed by Fozia Blue PA-C based on my observations and the provider's statements to me.       María Denis  1/30/2019   [facility] EMERGENCY DEPARTMENT       Fozia Blue  DAYAN  01/30/19 1929

## 2019-01-31 ENCOUNTER — APPOINTMENT (OUTPATIENT)
Dept: GENERAL RADIOLOGY | Facility: CLINIC | Age: 54
DRG: 853 | End: 2019-01-31
Payer: COMMERCIAL

## 2019-01-31 LAB
ANION GAP SERPL CALCULATED.3IONS-SCNC: 10 MMOL/L (ref 3–14)
BASE DEFICIT BLDA-SCNC: 0.1 MMOL/L
BASE EXCESS BLDV CALC-SCNC: 3.5 MMOL/L
BUN SERPL-MCNC: 41 MG/DL (ref 7–30)
CALCIUM SERPL-MCNC: 7.6 MG/DL (ref 8.5–10.1)
CHLORIDE SERPL-SCNC: 102 MMOL/L (ref 94–109)
CO2 SERPL-SCNC: 26 MMOL/L (ref 20–32)
CREAT SERPL-MCNC: 1.09 MG/DL (ref 0.52–1.04)
CREAT SERPL-MCNC: 1.46 MG/DL (ref 0.52–1.04)
ERYTHROCYTE [DISTWIDTH] IN BLOOD BY AUTOMATED COUNT: 17.4 % (ref 10–15)
GFR SERPL CREATININE-BSD FRML MDRD: 40 ML/MIN/{1.73_M2}
GFR SERPL CREATININE-BSD FRML MDRD: 57 ML/MIN/{1.73_M2}
GLUCOSE BLDC GLUCOMTR-MCNC: 129 MG/DL (ref 70–99)
GLUCOSE BLDC GLUCOMTR-MCNC: 131 MG/DL (ref 70–99)
GLUCOSE BLDC GLUCOMTR-MCNC: 132 MG/DL (ref 70–99)
GLUCOSE BLDC GLUCOMTR-MCNC: 145 MG/DL (ref 70–99)
GLUCOSE BLDC GLUCOMTR-MCNC: 99 MG/DL (ref 70–99)
GLUCOSE SERPL-MCNC: 139 MG/DL (ref 70–99)
GRAM STN SPEC: ABNORMAL
GRAM STN SPEC: NORMAL
GRAM STN SPEC: NORMAL
HCO3 BLD-SCNC: 26 MMOL/L (ref 21–28)
HCO3 BLDV-SCNC: 29 MMOL/L (ref 21–28)
HCT VFR BLD AUTO: 37.3 % (ref 35–47)
HGB BLD-MCNC: 11.9 G/DL (ref 11.7–15.7)
INTERPRETATION ECG - MUSE: NORMAL
Lab: ABNORMAL
Lab: ABNORMAL
MAGNESIUM SERPL-MCNC: 2.3 MG/DL (ref 1.6–2.3)
MCH RBC QN AUTO: 26 PG (ref 26.5–33)
MCHC RBC AUTO-ENTMCNC: 31.9 G/DL (ref 31.5–36.5)
MCV RBC AUTO: 82 FL (ref 78–100)
MRSA DNA SPEC QL NAA+PROBE: NEGATIVE
O2/TOTAL GAS SETTING VFR VENT: ABNORMAL %
O2/TOTAL GAS SETTING VFR VENT: NORMAL %
OXYHGB MFR BLD: 96 % (ref 92–100)
OXYHGB MFR BLDV: 75 %
PCO2 BLD: 44 MM HG (ref 35–45)
PCO2 BLDV: 49 MM HG (ref 40–50)
PH BLD: 7.37 PH (ref 7.35–7.45)
PH BLDV: 7.39 PH (ref 7.32–7.43)
PHOSPHATE SERPL-MCNC: 3.7 MG/DL (ref 2.5–4.5)
PLATELET # BLD AUTO: 178 10E9/L (ref 150–450)
PO2 BLD: 102 MM HG (ref 80–105)
PO2 BLDV: 43 MM HG (ref 25–47)
POTASSIUM SERPL-SCNC: 3.3 MMOL/L (ref 3.4–5.3)
POTASSIUM SERPL-SCNC: 3.9 MMOL/L (ref 3.4–5.3)
RBC # BLD AUTO: 4.57 10E12/L (ref 3.8–5.2)
SODIUM SERPL-SCNC: 138 MMOL/L (ref 133–144)
SPECIMEN SOURCE: ABNORMAL
SPECIMEN SOURCE: ABNORMAL
SPECIMEN SOURCE: NORMAL
SPECIMEN SOURCE: NORMAL
TROPONIN I SERPL-MCNC: 0.06 UG/L (ref 0–0.04)
WBC # BLD AUTO: 32.6 10E9/L (ref 4–11)

## 2019-01-31 PROCEDURE — 87070 CULTURE OTHR SPECIMN AEROBIC: CPT | Performed by: INTERNAL MEDICINE

## 2019-01-31 PROCEDURE — 25000128 H RX IP 250 OP 636: Performed by: INTERNAL MEDICINE

## 2019-01-31 PROCEDURE — 84132 ASSAY OF SERUM POTASSIUM: CPT | Performed by: SURGERY

## 2019-01-31 PROCEDURE — 99233 SBSQ HOSP IP/OBS HIGH 50: CPT | Performed by: INTERNAL MEDICINE

## 2019-01-31 PROCEDURE — G0463 HOSPITAL OUTPT CLINIC VISIT: HCPCS | Mod: 25

## 2019-01-31 PROCEDURE — 87205 SMEAR GRAM STAIN: CPT | Performed by: INTERNAL MEDICINE

## 2019-01-31 PROCEDURE — 40000275 ZZH STATISTIC RCP TIME EA 10 MIN

## 2019-01-31 PROCEDURE — 20000003 ZZH R&B ICU

## 2019-01-31 PROCEDURE — 94003 VENT MGMT INPAT SUBQ DAY: CPT

## 2019-01-31 PROCEDURE — 93005 ELECTROCARDIOGRAM TRACING: CPT

## 2019-01-31 PROCEDURE — 25000125 ZZHC RX 250

## 2019-01-31 PROCEDURE — 5A2204Z RESTORATION OF CARDIAC RHYTHM, SINGLE: ICD-10-PCS | Performed by: INTERNAL MEDICINE

## 2019-01-31 PROCEDURE — 36600 WITHDRAWAL OF ARTERIAL BLOOD: CPT

## 2019-01-31 PROCEDURE — 99207 ZZC APP CREDIT; MD BILLING SHARED VISIT: CPT | Performed by: INTERNAL MEDICINE

## 2019-01-31 PROCEDURE — 97602 WOUND(S) CARE NON-SELECTIVE: CPT

## 2019-01-31 PROCEDURE — 82565 ASSAY OF CREATININE: CPT | Performed by: INTERNAL MEDICINE

## 2019-01-31 PROCEDURE — 25000132 ZZH RX MED GY IP 250 OP 250 PS 637: Performed by: SURGERY

## 2019-01-31 PROCEDURE — 82805 BLOOD GASES W/O2 SATURATION: CPT | Performed by: INTERNAL MEDICINE

## 2019-01-31 PROCEDURE — 25000125 ZZHC RX 250: Performed by: SURGERY

## 2019-01-31 PROCEDURE — 25000125 ZZHC RX 250: Performed by: INTERNAL MEDICINE

## 2019-01-31 PROCEDURE — 80048 BASIC METABOLIC PNL TOTAL CA: CPT | Performed by: SURGERY

## 2019-01-31 PROCEDURE — 83735 ASSAY OF MAGNESIUM: CPT | Performed by: SURGERY

## 2019-01-31 PROCEDURE — 25000131 ZZH RX MED GY IP 250 OP 636 PS 637: Performed by: INTERNAL MEDICINE

## 2019-01-31 PROCEDURE — 00000146 ZZHCL STATISTIC GLUCOSE BY METER IP

## 2019-01-31 PROCEDURE — 71045 X-RAY EXAM CHEST 1 VIEW: CPT

## 2019-01-31 PROCEDURE — 99291 CRITICAL CARE FIRST HOUR: CPT | Performed by: SURGERY

## 2019-01-31 PROCEDURE — 25000128 H RX IP 250 OP 636: Performed by: SURGERY

## 2019-01-31 PROCEDURE — 84484 ASSAY OF TROPONIN QUANT: CPT | Performed by: SURGERY

## 2019-01-31 PROCEDURE — 82805 BLOOD GASES W/O2 SATURATION: CPT | Performed by: SURGERY

## 2019-01-31 PROCEDURE — 85027 COMPLETE CBC AUTOMATED: CPT | Performed by: SURGERY

## 2019-01-31 PROCEDURE — 93010 ELECTROCARDIOGRAM REPORT: CPT | Performed by: INTERNAL MEDICINE

## 2019-01-31 PROCEDURE — 25000128 H RX IP 250 OP 636

## 2019-01-31 PROCEDURE — 84100 ASSAY OF PHOSPHORUS: CPT | Performed by: SURGERY

## 2019-01-31 PROCEDURE — 25000132 ZZH RX MED GY IP 250 OP 250 PS 637: Performed by: INTERNAL MEDICINE

## 2019-01-31 RX ORDER — ADENOSINE 3 MG/ML
6 INJECTION, SOLUTION INTRAVENOUS ONCE
Status: COMPLETED | OUTPATIENT
Start: 2019-01-31 | End: 2019-01-31

## 2019-01-31 RX ORDER — POTASSIUM CHLORIDE 1.5 G/1.58G
20-40 POWDER, FOR SOLUTION ORAL
Status: DISCONTINUED | OUTPATIENT
Start: 2019-01-31 | End: 2019-02-24 | Stop reason: CLARIF

## 2019-01-31 RX ORDER — ADENOSINE 3 MG/ML
12 INJECTION, SOLUTION INTRAVENOUS ONCE
Status: COMPLETED | OUTPATIENT
Start: 2019-01-31 | End: 2019-01-31

## 2019-01-31 RX ORDER — PROPOFOL 10 MG/ML
5-75 INJECTION, EMULSION INTRAVENOUS CONTINUOUS
Status: DISCONTINUED | OUTPATIENT
Start: 2019-01-31 | End: 2019-02-01 | Stop reason: CLARIF

## 2019-01-31 RX ORDER — SODIUM CHLORIDE 9 MG/ML
INJECTION, SOLUTION INTRAVENOUS CONTINUOUS
Status: DISCONTINUED | OUTPATIENT
Start: 2019-01-31 | End: 2019-02-18

## 2019-01-31 RX ORDER — POTASSIUM CHLORIDE 29.8 MG/ML
20 INJECTION INTRAVENOUS
Status: DISCONTINUED | OUTPATIENT
Start: 2019-01-31 | End: 2019-02-24 | Stop reason: CLARIF

## 2019-01-31 RX ORDER — DULOXETIN HYDROCHLORIDE 60 MG/1
60 CAPSULE, DELAYED RELEASE ORAL
Status: DISCONTINUED | OUTPATIENT
Start: 2019-01-31 | End: 2019-02-01 | Stop reason: ALTCHOICE

## 2019-01-31 RX ORDER — ADENOSINE 3 MG/ML
INJECTION, SOLUTION INTRAVENOUS
Status: COMPLETED
Start: 2019-01-31 | End: 2019-01-31

## 2019-01-31 RX ORDER — MAGNESIUM SULFATE HEPTAHYDRATE 40 MG/ML
4 INJECTION, SOLUTION INTRAVENOUS EVERY 4 HOURS PRN
Status: DISCONTINUED | OUTPATIENT
Start: 2019-01-31 | End: 2019-02-27 | Stop reason: HOSPADM

## 2019-01-31 RX ORDER — POTASSIUM CHLORIDE 1500 MG/1
20-40 TABLET, EXTENDED RELEASE ORAL
Status: DISCONTINUED | OUTPATIENT
Start: 2019-01-31 | End: 2019-02-24 | Stop reason: CLARIF

## 2019-01-31 RX ORDER — DILTIAZEM HYDROCHLORIDE 5 MG/ML
20 INJECTION INTRAVENOUS ONCE
Status: COMPLETED | OUTPATIENT
Start: 2019-01-31 | End: 2019-01-31

## 2019-01-31 RX ORDER — DULOXETIN HYDROCHLORIDE 60 MG/1
60 CAPSULE, DELAYED RELEASE ORAL 2 TIMES DAILY
COMMUNITY
End: 2019-03-16

## 2019-01-31 RX ORDER — POTASSIUM CHLORIDE 7.45 MG/ML
10 INJECTION INTRAVENOUS
Status: DISCONTINUED | OUTPATIENT
Start: 2019-01-31 | End: 2019-02-24 | Stop reason: CLARIF

## 2019-01-31 RX ORDER — DILTIAZEM HYDROCHLORIDE 5 MG/ML
INJECTION INTRAVENOUS
Status: COMPLETED
Start: 2019-01-31 | End: 2019-01-31

## 2019-01-31 RX ORDER — POTASSIUM CL/LIDO/0.9 % NACL 10MEQ/0.1L
10 INTRAVENOUS SOLUTION, PIGGYBACK (ML) INTRAVENOUS
Status: DISCONTINUED | OUTPATIENT
Start: 2019-01-31 | End: 2019-02-24 | Stop reason: CLARIF

## 2019-01-31 RX ORDER — HEPARIN SODIUM 5000 [USP'U]/.5ML
5000 INJECTION, SOLUTION INTRAVENOUS; SUBCUTANEOUS EVERY 8 HOURS
Status: DISCONTINUED | OUTPATIENT
Start: 2019-01-31 | End: 2019-02-27 | Stop reason: HOSPADM

## 2019-01-31 RX ADMIN — BACLOFEN 10 MG: 10 TABLET ORAL at 11:08

## 2019-01-31 RX ADMIN — SODIUM CHLORIDE, POTASSIUM CHLORIDE, SODIUM LACTATE AND CALCIUM CHLORIDE: 600; 310; 30; 20 INJECTION, SOLUTION INTRAVENOUS at 05:29

## 2019-01-31 RX ADMIN — FENTANYL CITRATE 25 MCG: 50 INJECTION, SOLUTION INTRAMUSCULAR; INTRAVENOUS at 04:13

## 2019-01-31 RX ADMIN — TAZOBACTAM SODIUM AND PIPERACILLIN SODIUM 3.38 G: 375; 3 INJECTION, SOLUTION INTRAVENOUS at 21:21

## 2019-01-31 RX ADMIN — TAZOBACTAM SODIUM AND PIPERACILLIN SODIUM 3.38 G: 375; 3 INJECTION, SOLUTION INTRAVENOUS at 15:51

## 2019-01-31 RX ADMIN — PROPOFOL 75 MCG/KG/MIN: 10 INJECTION, EMULSION INTRAVENOUS at 12:44

## 2019-01-31 RX ADMIN — BACLOFEN 10 MG: 10 TABLET ORAL at 15:44

## 2019-01-31 RX ADMIN — PROPOFOL 45 MCG/KG/MIN: 10 INJECTION, EMULSION INTRAVENOUS at 09:03

## 2019-01-31 RX ADMIN — DILTIAZEM HYDROCHLORIDE 20 MG: 5 INJECTION INTRAVENOUS at 18:25

## 2019-01-31 RX ADMIN — SODIUM CHLORIDE, POTASSIUM CHLORIDE, SODIUM LACTATE AND CALCIUM CHLORIDE: 600; 310; 30; 20 INJECTION, SOLUTION INTRAVENOUS at 20:26

## 2019-01-31 RX ADMIN — FENTANYL CITRATE 25 MCG: 50 INJECTION, SOLUTION INTRAMUSCULAR; INTRAVENOUS at 09:20

## 2019-01-31 RX ADMIN — TAZOBACTAM SODIUM AND PIPERACILLIN SODIUM 3.38 G: 375; 3 INJECTION, SOLUTION INTRAVENOUS at 09:03

## 2019-01-31 RX ADMIN — AMIODARONE HYDROCHLORIDE 1 MG/MIN: 50 INJECTION, SOLUTION INTRAVENOUS at 23:11

## 2019-01-31 RX ADMIN — PROPOFOL 75 MCG/KG/MIN: 10 INJECTION, EMULSION INTRAVENOUS at 19:18

## 2019-01-31 RX ADMIN — POTASSIUM CHLORIDE 20 MEQ: 1.5 POWDER, FOR SOLUTION ORAL at 14:27

## 2019-01-31 RX ADMIN — PROPOFOL 60 MCG/KG/MIN: 10 INJECTION, EMULSION INTRAVENOUS at 21:08

## 2019-01-31 RX ADMIN — ASPIRIN 81 MG 162 MG: 81 TABLET ORAL at 11:07

## 2019-01-31 RX ADMIN — GABAPENTIN 600 MG: 600 TABLET, FILM COATED ORAL at 15:44

## 2019-01-31 RX ADMIN — SODIUM CHLORIDE, POTASSIUM CHLORIDE, SODIUM LACTATE AND CALCIUM CHLORIDE: 600; 310; 30; 20 INJECTION, SOLUTION INTRAVENOUS at 13:13

## 2019-01-31 RX ADMIN — PROPOFOL 75 MCG/KG/MIN: 10 INJECTION, EMULSION INTRAVENOUS at 11:04

## 2019-01-31 RX ADMIN — MIDAZOLAM 1 MG/HR: 5 INJECTION INTRAMUSCULAR; INTRAVENOUS at 20:23

## 2019-01-31 RX ADMIN — VANCOMYCIN HYDROCHLORIDE 2250 MG: 5 INJECTION, POWDER, LYOPHILIZED, FOR SOLUTION INTRAVENOUS at 23:12

## 2019-01-31 RX ADMIN — ATORVASTATIN CALCIUM 10 MG: 10 TABLET, FILM COATED ORAL at 11:08

## 2019-01-31 RX ADMIN — ADENOSINE 6 MG: 3 INJECTION, SOLUTION INTRAVENOUS at 18:15

## 2019-01-31 RX ADMIN — HEPARIN SODIUM 5000 UNITS: 5000 INJECTION, SOLUTION INTRAVENOUS; SUBCUTANEOUS at 23:14

## 2019-01-31 RX ADMIN — PROPOFOL 75 MCG/KG/MIN: 10 INJECTION, EMULSION INTRAVENOUS at 17:55

## 2019-01-31 RX ADMIN — ADENOSINE 12 MG: 3 INJECTION, SOLUTION INTRAVENOUS at 18:26

## 2019-01-31 RX ADMIN — Medication 50 MCG/HR: at 09:56

## 2019-01-31 RX ADMIN — ADENOSINE 12 MG: 3 INJECTION, SOLUTION INTRAVENOUS at 18:39

## 2019-01-31 RX ADMIN — FENTANYL CITRATE 25 MCG: 50 INJECTION, SOLUTION INTRAMUSCULAR; INTRAVENOUS at 06:47

## 2019-01-31 RX ADMIN — VASOPRESSIN 2.4 UNITS/HR: 20 INJECTION INTRAVENOUS at 23:51

## 2019-01-31 RX ADMIN — POTASSIUM CHLORIDE 40 MEQ: 1.5 POWDER, FOR SOLUTION ORAL at 12:44

## 2019-01-31 RX ADMIN — HEPARIN SODIUM 5000 UNITS: 5000 INJECTION, SOLUTION INTRAVENOUS; SUBCUTANEOUS at 15:44

## 2019-01-31 RX ADMIN — PROPOFOL 75 MCG/KG/MIN: 10 INJECTION, EMULSION INTRAVENOUS at 14:25

## 2019-01-31 RX ADMIN — AMIODARONE HYDROCHLORIDE 1 MG/MIN: 50 INJECTION, SOLUTION INTRAVENOUS at 19:16

## 2019-01-31 RX ADMIN — POTASSIUM CHLORIDE 20 MEQ: 1.5 POWDER, FOR SOLUTION ORAL at 20:08

## 2019-01-31 RX ADMIN — GABAPENTIN 600 MG: 600 TABLET, FILM COATED ORAL at 11:08

## 2019-01-31 RX ADMIN — GABAPENTIN 600 MG: 600 TABLET, FILM COATED ORAL at 21:09

## 2019-01-31 RX ADMIN — PROPOFOL 75 MCG/KG/MIN: 10 INJECTION, EMULSION INTRAVENOUS at 15:52

## 2019-01-31 RX ADMIN — FAMOTIDINE 20 MG: 10 INJECTION, SOLUTION INTRAVENOUS at 10:03

## 2019-01-31 RX ADMIN — AMIODARONE HYDROCHLORIDE 150 MG: 1.5 INJECTION, SOLUTION INTRAVENOUS at 18:45

## 2019-01-31 RX ADMIN — ANGIOTENSIN II 20 NG/KG/MIN: 2.5 INJECTION INTRAVENOUS at 19:46

## 2019-01-31 RX ADMIN — FENTANYL CITRATE 25 MCG: 50 INJECTION, SOLUTION INTRAMUSCULAR; INTRAVENOUS at 05:27

## 2019-01-31 RX ADMIN — AMITRIPTYLINE HYDROCHLORIDE 100 MG: 50 TABLET, FILM COATED ORAL at 21:10

## 2019-01-31 RX ADMIN — VANCOMYCIN HYDROCHLORIDE 2250 MG: 5 INJECTION, POWDER, LYOPHILIZED, FOR SOLUTION INTRAVENOUS at 13:12

## 2019-01-31 RX ADMIN — PROPOFOL 25 MCG/KG/MIN: 10 INJECTION, EMULSION INTRAVENOUS at 01:35

## 2019-01-31 RX ADMIN — TAZOBACTAM SODIUM AND PIPERACILLIN SODIUM 3.38 G: 375; 3 INJECTION, SOLUTION INTRAVENOUS at 03:43

## 2019-01-31 RX ADMIN — BACLOFEN 10 MG: 10 TABLET ORAL at 21:09

## 2019-01-31 RX ADMIN — PROPOFOL 35 MCG/KG/MIN: 10 INJECTION, EMULSION INTRAVENOUS at 05:58

## 2019-01-31 RX ADMIN — INSULIN ASPART 1 UNITS: 100 INJECTION, SOLUTION INTRAVENOUS; SUBCUTANEOUS at 20:43

## 2019-01-31 RX ADMIN — ADENOSINE 12 MG: 3 INJECTION, SOLUTION INTRAVENOUS at 18:19

## 2019-01-31 RX ADMIN — RANITIDINE HYDROCHLORIDE 150 MG: 15 SOLUTION ORAL at 20:08

## 2019-01-31 ASSESSMENT — ACTIVITIES OF DAILY LIVING (ADL)
FALL_HISTORY_WITHIN_LAST_SIX_MONTHS: YES
DRESS: 2-->ASSISTIVE PERSON
ADLS_ACUITY_SCORE: 30
ADLS_ACUITY_SCORE: 24
BATHING: 2-->ASSISTIVE PERSON
RETIRED_EATING: 0-->INDEPENDENT
TOILETING: 3-->ASSISTIVE EQUIPMENT AND PERSON
RETIRED_COMMUNICATION: 0-->UNDERSTANDS/COMMUNICATES WITHOUT DIFFICULTY
ADLS_ACUITY_SCORE: 30
ADLS_ACUITY_SCORE: 30
COGNITION: 0 - NO COGNITION ISSUES REPORTED
ADLS_ACUITY_SCORE: 25
ADLS_ACUITY_SCORE: 30
TRANSFERRING: 2-->ASSISTIVE PERSON
NUMBER_OF_TIMES_PATIENT_HAS_FALLEN_WITHIN_LAST_SIX_MONTHS: 1
AMBULATION: 4-->COMPLETELY DEPENDENT
SWALLOWING: 0-->SWALLOWS FOODS/LIQUIDS WITHOUT DIFFICULTY

## 2019-01-31 ASSESSMENT — COLUMBIA-SUICIDE SEVERITY RATING SCALE - C-SSRS
1. IN THE PAST MONTH, HAVE YOU WISHED YOU WERE DEAD OR WISHED YOU COULD GO TO SLEEP AND NOT WAKE UP?: OTHER (SEE COMMENTS)
IS THE PATIENT NOT ABLE TO COMPLETE C-SSRS: UNABLE TO VERBALIZE
2. HAVE YOU ACTUALLY HAD ANY THOUGHTS OF KILLING YOURSELF IN THE PAST MONTH?: OTHER (SEE COMMENTS)

## 2019-01-31 ASSESSMENT — MIFFLIN-ST. JEOR: SCORE: 1842

## 2019-01-31 NOTE — ED PROVIDER NOTES
Central Line Placement     Procedure:  Central Line Placement with Ultrasound Guidance.    Indications: Vascular access    Consent:  Risks (including but not limited to: pneumothorax,bleeding, infection or artery puncture), benefits and alternatives were discussed with  unable to obtain due to emergency conditions and consent for procedure was obtained.    Timeout:  Universal protocol was followed. TIME OUT conducted just prior to starting procedure confirmed patient identity, site/side, procedure, patient position, and availability of correct equipment and implants.?  Yes    Procedure note:  Right Internal Jugular approach was selected and the right anterior neck was prepped, cleansed and draped in a sterile fashion.  Mask, gown and gloves were used per sterile protocol.  Patient was then placed into Trendelenburg position and lidocaine was used for local anesthesia.  Vascular probe with ultrasound was used in a sterile fashion for guidence.  Introducer needle was then used to gain access to the central venous circulation.  Using Seldinger technique the  Triple lumen catheter was placed.  Catheter port(s) were aspirated and flushed.  Central line was sutured in place and sterile dressing applied.   Appropriate placement was confirmed by x-ray and no pneumothorax was visualized.    Patient Status:  Patient tolerated the procedure well.  There were no complications.               Jose Mejias MD  01/30/19 [id number]

## 2019-01-31 NOTE — PROGRESS NOTES
RT Note:    ABG obtained from right radial artery. 80% FiO2.    Evi Kovacs  January 30, 2019.10:30 PM

## 2019-01-31 NOTE — PHARMACY-VANCOMYCIN DOSING SERVICE
Pharmacy Empiric Dose Change Per Policy  Original Dose Ordered: Vancomycin 2000mg Q24H  Dose Changed To: Vancomycin 2250mg Q12H  This dose change was based on the pharmacist's assessment of this patient's age, weight, concurrent drug therapy, treatment goals, whether patient's creatinine clearance adequately indicates renal function (factoring in age, muscle mass, fluid and clinical status), and, if applicable, prior pharmacokinetic data.      Estimated Creatinine Clearance: 76.7 mL/min (A) (based on SCr of 1.09 mg/dL (H)).  Will continue to follow and modify dosage according to levels, organ function and clinical condition    Davis Lancaster, KierstenD./PGY-1 Resident   [phone]

## 2019-01-31 NOTE — ANESTHESIA CARE TRANSFER NOTE
Patient: Amanda Richardson    Procedure(s):  COMBINED CYSTOSCOPY, RETROGRADES, URETEROSCOPY, INSERT STENT    Diagnosis: left stone  Diagnosis Additional Information: No value filed.    Anesthesia Type:   General, ETT     Note:  Airway :ETT  Patient transferred to:ICU  ICU Handoff: Call for PAUSE to initiate/utilize ICU HANDOFF, Identified Patient, Identified Responsible Provider, Reviewed the Pertinent Medical History, Discussed Surgical Course, Reviewed Intra-OP Anesthesia Management and Issues during Anesthesia, Set Expectations for Post Procedure Period and Allowed Opportunity for Questions and Acknowledgement of Understanding      Vitals: (Last set prior to Anesthesia Care Transfer)    CRNA VITALS  1/30/2019 1959 - 1/30/2019 2043 1/30/2019             Pulse:  87    SpO2:  96 %    Resp Rate (observed):  12                Electronically Signed By: BAYLEE Feldman CRNA  January 30, 2019  8:43 PM

## 2019-01-31 NOTE — PLAN OF CARE
ICU End of Shift Summary.  For vital signs and complete assessments, please see documentation flowsheets.     Pertinent assessments: pt remains sedated and full vent support overnight. Pt maintaining a RASS score of -1 with occasional propofol adjustments. Pt with thick secretions via ETT, sputum sent this shift. Lungs coarse bilaterally. Pt with good urine output via ramos, urine cloudy. Temp max 99.2 ax this am. IV fentanyl given X2 overnight, pt is opiate dependent at home. Bilateral groin wounds odorous, tan drainage.  Pt remains on levophed, maps over 65. NGT to LIS with 500 ml bilious drainage overnight. Pt with hypoactive bowel sounds.   Major Shift Events: chest xray done this am as well as ABG's.   Plan (Upcoming Events): continue current cares, await sputum and groin cultures.  Discharge/Transfer Needs: unclear if will need TCU on discharge, pt comes from home with her     Bedside Shift Report Completed : yes   Bedside Safety Check Completed:yes

## 2019-01-31 NOTE — ANESTHESIA POSTPROCEDURE EVALUATION
Patient: Amanda Richardson    Procedure(s):  COMBINED CYSTOSCOPY, RETROGRADES, URETEROSCOPY, INSERT STENT    Diagnosis:left stone  Diagnosis Additional Information: 1. Cystoscopy  2. LEFT retrograde pyelogram  3. LEFT JJ stent placement  4. <1hr physician fluoroscopy time         Anesthesia Type:  General, ETT    Note:  Anesthesia Post Evaluation    Patient location during evaluation: PACU  Patient participation: Able to fully participate in evaluation  Level of consciousness: awake and alert  Pain management: adequate  Airway patency: patent  Cardiovascular status: acceptable  Respiratory status: acceptable  Hydration status: acceptable  PONV: controlled     Anesthetic complications: None          Last vitals:  Vitals:    01/30/19 1940 01/30/19 1945 01/30/19 2045   BP: 91/44 93/48    Pulse: 96 94    Resp: 13 14    Temp:      SpO2: 96% 97% 95%         Electronically Signed By: Emmanuel Quispe MD  January 30, 2019  8:55 PM

## 2019-01-31 NOTE — ED NOTES
1902 --30 etomidate   1903 --150 succs  1904-- 7.5 ETT placed at 24 at teeth. Good color change. Sating 92%

## 2019-01-31 NOTE — PROGRESS NOTES
RT Note:    Patient remains on ventilator with settings of:    Ventilation Mode: CMV/AC  (Continuous Mandatory Ventilation/ Assist Control)  FiO2 (%): 70 %  Rate Set (breaths/minute): 20 breaths/min  Tidal Volume Set (mL): 550 mL  PEEP (cm H2O): 8 cmH2O  Oxygen Concentration (%): 70 %  Resp: 20    Breath sounds coarse, suctioning small amounts of thick white secretions. RT will continue to monitor.    ABG drawn at 5:55AM from left radial artery.    Evi Kovacs  January 31, 2019.4:58 AM

## 2019-01-31 NOTE — OP NOTE
OPERATIVE REPORT  DATE OF SURGERY: 01/30/19  LOCATION OF SURGERY: [facility]  PREOPERATIVE DIAGNOSIS:  (N17.9) JULIANNE (acute kidney injury) (H)  (N13.2) Hydronephrosis with urinary obstruction due to ureteral calculus  (S72.001A) Closed fracture of neck of right femur, initial encounter (H)  (R41.82) Altered mental status  POSTOPERATIVE DIAGNOSIS: (N17.9) JULIANNE (acute kidney injury) (H)  (N13.2) Hydronephrosis with urinary obstruction due to ureteral calculus  (S72.001A) Closed fracture of neck of right femur, initial encounter (H)  (R41.82) Altered mental status  START TIME: 8:10 PM  END TIME: 8:21 PM    PROCEDURE PERFORMED:   1. Cystoscopy  2. LEFT retrograde pyelogram  3. LEFT JJ stent placement  4. <1hr physician fluoroscopy time      STAFF SURGEON: Epifanio Sapp MD  ANESTHESIA: General.   ESTIMATED BLOOD LOSS: 0 mL.   DRAINS AND TUBES: LEFT 6fr x 24cm ureteral stent, 20 fr ramos catheter  COMPLICATIONS: None.   DISPOSITION: PACU.   SPECIMENS OBTAINED:   ID Type Source Tests Collected by Time Destination   1 : urine Urine Urine catheter URINE CULTURE AEROBIC BACTERIAL Epifanio Sapp MD 1/30/2019  8:16 PM       SIGNIFICANT FINDINGS: Cystoscopy with evidence of cloudy and purulent urine in the bladder. LEFT Retrograde Pyelogram with evidence of medial deviation of the ureter likely secondary to spinal hardware with stones noted proximal to this deviation and mild hydronephrosis. LEFT ureteral stent placed with drainage of tan/brown purulent urine. Urine culture obtained following stent placement     HISTORY OF PRESENT ILLNESS:   Amanda Richardson is a 53 year old female who was seen intubated and sedated in the ER with plans for urgent transport to the OR for LEFT ureteral stent placement.   She has history of chronic back pain for which she is on oxycodone 15mg q4h, HTN, morbid obestiy, DM II, t-cell lymphoma s/p treatment. Her  noted increased somnolence over the past few days and was difficult to arose  "which prompted ER transport by EMS. In the ER she was difficult to arouse. She was noted to have a leukocytosis to 43k, SCr to 2.25, and CT scan revealed obstructing LEFT proximal ureteral stone with associated hydronephrosis. She was also noted to have a RIGHT hip fracture of unknown duration.     OPERATION PERFORMED:   Informed consent was obtained and the patient was brought to the operating room where general anesthesia was induced. The patient was given appropriate preoperative antibiotics and positioned supine. The patient was then repositioned in dorsal lithotomy with all pressure points padded. We then performed a timeout, verifying the correct patient's site and procedure to be performed.    A 22fr cystoscope was inserted atraumatically into the bladder. Cystoscopy revealed evidence of cloudy and purulent urine within the bladder. The LEFT UO was identified and cannulated with a 6fr \"tiger-tail\" open ended catheter. A gentle Retrograde Pyelogram was completed with evidence of medial deviation of the ureter with ureteral stones noted proximal to this point. There was evidence of mild hydronephrosis. A 0.035 Sensor wire was advanced up to the renal pelvis under fluoroscopic guidance and the open ended catheter was removed. A 6fr x 24cm ureteral stent was advanced over the wire with good coil noted in the kidney fluoroscopically and in the bladder on direct vision. Following placement of the stent there was drainage of tan/brown thick purulent urine from the left side. A urine sample was obtained for culture. The scope was removed and a 20fr ramos catheter was placed.   She remained intubated and sedated with plans for transfer to the ICU for continued monitoring.     Plan:  - Continue broad spectrum antibiotics  - continue ramos to gravity drainage    Epifanio Sapp MD   Urology  [facility]  Clinic Phone [phone]     "

## 2019-01-31 NOTE — PROGRESS NOTES
Aware of patient in ED    Please keep NPO for possible surgery in AM  Will discuss further with family in the morning    Shannon LAURENT

## 2019-01-31 NOTE — PROGRESS NOTES
ICU staff  DOS 1/31/2019    Ms Richardson was admitted to the ICU overnight with septic shock and respiratory failure caused by obstructing nephrolithiasis which led to pyelonephritis/upper urinary tract infection. She is s/p ureteral stent placement by urology.    Her past medical history includes multiple sclerosis, chronic pain, t-cell lymphoma, DM2, morbid obesity, and hypertension. She has reported allergies to cyclobenzaprine and lisinopril. She uses a wheelchair at baseline. She is a smoker.     On my examination this morning she is restless but otherwise not interactive. Her heart rate varies from the 120s to the 170s, though there is significant movement artifact and she is having PACs.     Vitals:   99.2  90s-110s  20  100s/50s    20/550/8/55, 7.37/44/102/26    Propofol 25-40  NE 0.03-0.12 mcg/kg/min    I/O: 3029/2650    Exam:  Gen: Restless, intubated patient in no apparent distress  HEENT: NC/AT, no icterus  Pulm: Clear bilaterally  Cor: Irregular rate/rhythm; HR by pulse and auscultation is slower than that on the monitor (100s-120s)  Abdomen/GI: Soft, nondistended, obese  : Bobo with cloudy urine in tube, though it is clear in collection bag  Extremities: Warm, nonedematous  Skin: Well-perfused  Neuro: Restless, nonpurposeful, sedated  Psych: Unable to assess    Data:   Labs reviewed  - Cr 2.26 -> 1.09 now; K 3.3, Mg 2.3  - WBCs 47.7 -> 32.6  - Glucose   Imaging reviewed  - CXR with left basilar atelectasis  - CT head without acute changes  - CT A/P significant for left hydronephrosis  Microbiology reviewed  - UA with pyuria -- large LE, 104 WBCs, ++ bacteria  - Polymicrobial growth from skin swabs  - Blood culture 2/2 bottles with GNRs (E coli by verigene)    Assessment/plan:  54 y/o woman admitted with UTI, respiratory failure, septic shock, bacteremia. Source should be controlled s/p ureteral stent placement.  CNS: Restless.  - Sedation: Propofol. Consider adding dexmedetomidine if needed -- I  suspect some of her pressor demand relates to propofol.  - Analgesia: Added fentanyl infusion.  - Continues on home amitriptyline, baclofen, duloxetine, gabapentin.  - Multiple sclerosis  Pulm: Tolerating mechanical ventilation.  - Acute respiratory failure: Continue ventilator support. Not a weaning candidate today.   - Check VBG this afternoon as her Vt is a bit on the high side for her IBW.  CV: Requiring norepinephrine for MAP>65.  - Septic shock: From urinary tract infection. Wean pressors as able.  - HTN: Have discontinued metoprolol, holding home losartan.  FEN/GI: Abdomen benign.  - Nutrition: OK to start trophic feedings.  - Morbid obesity affecting cares: BMI 47.  : UOP brisk overall, creatinine improving.  - Urosepsis from obstructive stone: s/p stent. Urology following.  - JULIANNE: Creatinine trending down.  Heme: Hb stable at 11.9, WBCs trending appropriately. Baseline WBCs in the mid-teens per the chart.  Msk: No acute issues.  Endo: Sliding scale insulin for DM2.  ID: Afebrile, WBCs improved at 32.   - UTI: No organisms identified yet  - E coli bacteremia  -> Continue pip/tazo, vancomycin for now. De-escalate as able.  ICU: SCDs, added H2 and SQH.    This patient is critically ill to my assessment and requires ICU monitoring and cares. A total of 40 minutes critical care time spent on 1/31/2019.     Shola Godoy MD, PhD  Surgical critical care  January 31, 2019, 12:35 PM

## 2019-01-31 NOTE — PROGRESS NOTES
ADDENDUM:  CALLED BY BEDSIDE RN REGARDING INGUINAL INFECTIONS BILATERALLY THAT LOOK LIKE HYDRADENITIS SUPPURATIVA. THE WOUNDS HAVE BEEN SWABBED AND I DID NOT FIND ADDITIONAL FLUID COLLECTIONS THAT WOULD NEED SURGICAL ATTENTION.    -- WILL GIVE A SINGLE DOSE OF VANCO, TO COVER FOR THE POSSIBILITY THAT THERE IS MRSA COLONIZATION.    I ALSO NOTE THAT THE UROLOGIST REPORTED TO ME THAT THE PATIENT HAD OBVIOUS PUS DRAINAGE IN THE STENT.    kp

## 2019-01-31 NOTE — PROGRESS NOTES
"[facility] Nurse Inpatient Wound Assessment     Assessment of wound(s) on pt's:   Bilateral groin folds        Data:   Patient History:      per MD note(s):  53 year old woman with advanced MS (able to pivot) who resides at home with her .  She was brought to the ED by EMS due to weakness and apparent increased somnolence. He recognized that she probably had a UTI, \"because this is how she gets with those\".    See MD note for complete history     Moisture Management:  Urinary Catheter    Catheter secured? Yes    Current Diet / Nutrition:       Orders Placed This Encounter        NPO per Anesthesia Guidelines for Procedure/Surgery Except for: Meds                Issac Assessment and sub scores:   No Data Recorded     Mattress:  Standard , Evolvoe    Labs:         Recent Labs   Lab Test 01/31/19  1023 01/30/19  1258  07/01/15  1552  10/03/13  1614   ALBUMIN  --  2.1*   < > 3.3*   < > 3.6*   HGB 11.9 12.9   < > 12.8   < > 12.7   RBC 4.57 4.99   < > 4.85   < > 4.67   WBC 32.6* 47.7*   < > 15.4*   < > 14.4*    203   < > 402   < > 318   INR  --   --   --   --   --  0.93   A1C  --  6.4*   < >  --   --   --    CRP  --   --   --  15.0*  --   --     < > = values in this interval not displayed.          Wound Assessment (location ):   Bilateral groin skin folds  Wound History:  Suspected fungal infection with Hydradenitis     Specific Dimensions (length x width x depth, in cm):       Right Groin: 8x0.5x 0.1cm deep red wound base, slight amount bloody drainage,     Left Groin: 13 x 0.8 x 0.2cm with multiple skin bridges consistent with chronic healing and reopening wound base, minimal bloody drainage, wound base moist pink dermis    Periwound Skin: erythema, rash and odor consistent with fungal infection,  Perineal hair growing into base    Odor: strong    Pain:  unable to assess , pt intubated          Intervention:     Patient's chart evaluated.      Wound(s) was assessed    Wound Care: was " done:  Cleansed assessed and Interdry placed    Orders  Written    Supplies  ordered: per POC and discussed with RN    Discussed plan of care with Nurse          Assessment:       Suspect fungal infection with hydradenitis infection, chronic wounds, very odorous, multiple skin bridges prove chronicity.   Discussion with Ortho MD who states that hip surgery will be held until the skin heals so plan to promote wound healing and gentle debridement of skin base.         Plan:     Nursing to notify the Provider(s) and re-consult the WO Nurse if wound(s) deteriorate(s) or if the wound care plan needs reevaluation.    Plan of care for wound located on bilateral groin: BID    1. Wash wounds with wound spray and pat dry    2. Wash periwound with Tereza and soft cloth    3. Dust the OPEN wounds with Stoma Powder    4. Lightly dust the periwound and groin folds with Antifungal powder and rub briskly into skin    5. Apply layer of Triad paste to OPEN wounds and cover with ABD    WOC Nurse will return: weekly and PRN

## 2019-01-31 NOTE — H&P
"[facility] History and Physical    Amanda Richardson MRN# [id number]   Age: 53 year old YOB: 1965     Date of Admission:  1/30/2019    Home clinic: [facility]  Primary care provider: Julius Hassan          Assessment and Plan:   Assessment:   Amanda Richardson is a 53 year old woman with advanced MS (able to pivot) who resides at home with her .  She was brought to the ED by EMS due to weakness and apparent increased somnolence. He recognized that she probably had a UTI, \"because this is how she gets with those\".      PMH is also notable for having tolerated GETA in the past, chronic back pain for which she is on Oxycodone 15 mg q 4 hours, HTN, morbid obesity, DM II, remote Tobacco abuse and T-cell lymphoma that has been successfully treated in 2011.  It is not clear why she has a chronically elevated WBC, but it has been running in the low to mid-teens for at least 7 years.    In the ED, Ms. Richardson was noted to be somnolent and difficult to arouse.  She was given a small dose of Narcan with minimal results.  Nevertheless, she was able to respond to some and reported leg pain bilaterally.  (To me she complained of back pain.)  She was at least mildly hypotensive with a heart rate of 94, her white cell count was markedly elevated at 43,000, and creatinine was markedly elevated at 2.25.  Due to her 's report of constipation with vomiting this past week, a CT scan of the abdomen and pelvis was obtained.  This revealed mild left hydronephrosis with proximal stones and an incidentally noted right femoral neck fracture of unclear age.      Due to a venous blood gas that showed CO2 retention along with mild acidosis, the patient was placed on BiPAP.  She now appears stable though with persistent Isaiah borderline blood pressures.  Dr. Mejias, who is taking over her care in the emergency department is contemplating placement of a central line after intubation with " anticipating that this would need to be undertaken in the operating room in any case.    At the time of admission, Fozia Blue PA-C from the emergency department, has contacted both orthopedics and urology.  The plan is for the patient to go to the operating room before she goes to the ICU.      Diagnoses:  1.  Septic shock, likely due to urinary tract source.  Note that urine and blood cultures are pending at this time, noting that the last urinary tract infection (June 2018) grew a pansensitive E. coli.  Patient does not have a chronic Bobo catheter but apparently has frequent urinary infections.       -Patient was given Unasyn and subsequently ceftriaxone as well as azithromycin in the emergency department.  There had been concern for aspiration pneumonia I believe on the CT scan.       -Ms. Richardson was given 2 L of NS 1 L of LR for fluid resuscitation.  2.  Left hydronephrosis with evident proximal kidney stones.  Possibility of infected stones or infection behind the obstruction is certainly worrisome.       -Dr. Sapp will see the patient for urology this evening.  Plans are already being made for stent placement.  3.  Right femoral neck fracture.  The patient does not appear to have significant right hip pain with manipulation of the right leg.  4.  Hypercapnic respiratory failure.  This is probably multifactorial including elements of chronic narcotic exposure, acute illness in the setting of profound systemic weakness from MS and obesity with a body mass index probably about 50.  5.  Acute kidney injury.  Baseline kidney function is normal with a creatinine less than 1.  6.  Type 2 diabetes, typically managed on metformin alone.  7.  MS with associated chronic pain.  Ms. Richardson is on multiple medications for pain including amitriptyline 100 mg at bedtime, baclofen 10 mg 3 times daily with an additional dose as needed, duloxetine 60 mg twice daily, gabapentin 600 mg 3 times daily and oxycodone 7.5-15 mg  every 4 hours as needed (limit of 4 tablets/day).  8.  Hypertension.  9.  Morbid obesity.  10.  Significant leukocytosis.  Given the patient's history of T-cell non-Hodgkin's lymphoma, will need to carefully monitor resolution of the elevated white cell count.  Otherwise should consider further evaluation.  11.  I note that the patient has chronic medical findings according to her .  She does have chronic edema for which she sometimes wears support hose and he tells me that the purple discoloration of her left foot is often even more prominent than it is right now.         Plan:   1.  Admit to ICU.  (Anticipate patient will need to be left intubated following the procedure this evening.)  2.  Zosyn to cover urinary tract infection until we have better sense of pathogen and sensitivities.  3.  Insulin based on glucoses every 4 hours assuming the patient will not be able to eat for the next 12-24 hours.  4.  Hold losartan/hydrochlorothiazide.  5.  Urology and orthopedic consultations have been requested.  6.  Usual medications have been resumed.  7.  Consider pain service consultation.             Chief Complaint:   Change in status, decreased mentation and vomiting     History is obtained from the patient's  by telephone, emergency room provider and electronic medical record.    Ms. Richardson is unable to give much helpful information in the emergency department.  She is extremely somnolent but seems to respond to sternal rub.    I spoke with the patient's  by phone after he had left the emergency department.  He indicated that he was concerned that the patient had a urinary tract infection earlier this morning for which reason he had asked for her to be brought to attention.  He states that she has been ill for the last 2-3 days but with constipation and vomiting.  He became concerned this morning because she was more somnolent and had more difficulty with moving.    The patient evidently is able  "to stand and pivot but apparently she could not this morning.  There is a history of apparently \"sliding off of a chair\" over the past couple of days though the story surrounding this is vague.    I was able to confirm with the patient's  she should be FULL CODE.          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 - Dr Erickson - Stage IA - CD20 negative     Tobacco abuse 06/11/2012    former     Type 2 diabetes, HbA1c goal < 7% (H)              Past Surgical History:      Past Surgical History:   Procedure Laterality Date     COLONOSCOPY N/A 1/7/2015    tubular adenomas x 2 - due 5 yrs     FUSION LUMBAR ANTERIOR, FUSION LUMBAR POSTERIOR TWO LEVELS, COMBINED  10/17/2013    lumbar fusion - Dr Floyd     INSERT PUMP BACLOFEN  04/2017    intrathecal baclofen pump implantation     IRRIGATION AND DEBRIDEMENT LOWER EXTREMITY, COMBINED Right 2015    Right ankle I&D d/t infection     OPEN REDUCTION INTERNAL FIXATION ANKLE  5/15    Right Bimalleolar ankle fx ORIF     OPEN REDUCTION INTERNAL FIXATION ANKLE Right 11/2015    Revision due to spasms " pulling screws out of ankle     SINUS SURGERY      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     XR LUMBAR EPIDURAL INJECTION INCL IMAGING  3/14    Left L4-5 Epidural Dr Winter             Social History:     Social History     Tobacco Use     Smoking status: Current Every Day Smoker     Packs/day: 0.50     Types: Cigarettes     Last attempt to quit: 2013     Years since quittin.4     Smokeless tobacco: Never Used     Tobacco comment: since age 17   Substance Use Topics     Alcohol use: No             Family History:     Family History   Problem Relation Age of Onset     C.A.D. Father         with CHF      Cancer Father         ? unsure type - abdominal      Hypertension Mother      Thyroid Disease Mother         goiter      Breast Cancer Sister 58     Thyroid Disease Son      Cancer - colorectal No family hx of      Family history reviewed but considered noncontributory.         Allergies:     Allergies   Allergen Reactions     Lisinopril      Lip swelling     Cyclobenzaprine Other (See Comments)     Per 4-10- H&P by Yanna Dugan PA-C.     Flexeril [Cyclobenzaprine Hcl]      Got confused              Medications:   Pending reconciliation.         Review of Systems:   A comprehensive review of systems was performed and found to be negative except as described in this note           Physical Exam:     Vitals were reviewed  Patient Vitals for the past 8 hrs:   BP Temp Temp src Pulse Heart Rate Resp SpO2   19 1900 -- -- -- -- 98 12 99 %   19 185 -- -- -- -- -- -- 99 %   19 185 92/55 -- -- 99 -- -- --   19 1845 92/54 -- -- 110 -- -- 100 %   19 181 90/52 -- -- -- -- -- --   19 -- -- -- -- -- -- 100 %   19 1805 91/55 -- -- 108 -- -- (!) 85 %   19 1745 101/57 -- -- 91 -- -- --   19 1730 96/47 -- -- 92 -- -- --   19 172 -- -- -- -- -- -- 97 %   19 90/46 -- -- -- -- -- 97 %   19 90/46 -- -- -- -- -- 97 %    01/30/19 1630 -- -- -- 98 -- -- --   01/30/19 1620 97/48 -- -- -- -- -- 95 %   01/30/19 1600 99/40 -- -- 100 -- -- 94 %   01/30/19 1533 128/78 -- -- 109 109 21 96 %   01/30/19 1515 -- -- -- -- -- -- (!) 88 %   01/30/19 1514 -- -- -- -- 100 -- --   01/30/19 1506 -- -- -- -- 93 17 (!) 86 %   01/30/19 1500 -- -- -- -- 95 12 (!) 89 %   01/30/19 1457 -- -- -- -- 94 9 (!) 87 %   01/30/19 1449 -- 97  F (36.1  C) Rectal -- -- -- --   01/30/19 1221 92/81 98.7  F (37.1  C) Temporal -- 94 12 90 %     Constitutional: Very obese, somnolent.  On BiPAP and appearing in no apparent distress.  Responds to sternal rub.  Eyes:   ENT: Normocephalic, without obvious abnormality, atraumatic.  I left BiPAP in place.  Please see Dr. Mejias' note at the time of intubation for further details.  Neck: Supple, symmetrical, trachea midline, no adenopathy, thyroid symmetric, not enlarged and no tenderness, skin normal.  Hematologic / Lymphatic: No cervical lymphadenopathy and no supraclavicular lymphadenopathy.  Back: Symmetric, no obvious curvature, spinous processes are non-tender on palpation, paraspinous muscles are non-tender on palpation, no costal vertebral tenderness.  Lungs: No increased work of breathing, good air exchange, clear to auscultation bilaterally, no crackles or wheezing.  Cardiovascular: Regular rate and rhythm, normal S1 and S2, no S3 or S4, and no murmur noted.  Abdomen: Obesity and positioning limit sensitivity of the examination.  Bowel sounds are active.  No obvious tenderness.  Right upper quadrant subcutaneous pain pump is in place.  Musculoskeletal: No redness, warmth, or swelling of the joints.  The patient did not appear to have any significant discomfort referrable to the right hip when I moved her on the emergency room gurney.  Neurologic: Not easily aroused.  Does not participate with examination.  Neuropsychiatric: Normal affect, mood, orientation, memory and insight.  Skin: No rashes, erythema, pallor,  petechia.  Left great toe is cyanotic appearing.         Data:     Results for orders placed or performed during the hospital encounter of 01/30/19 (from the past 24 hour(s))   CBC with platelets differential   Result Value Ref Range    WBC 47.7 (H) 4.0 - 11.0 10e9/L    RBC Count 4.99 3.8 - 5.2 10e12/L    Hemoglobin 12.9 11.7 - 15.7 g/dL    Hematocrit 40.3 35.0 - 47.0 %    MCV 81 78 - 100 fl    MCH 25.9 (L) 26.5 - 33.0 pg    MCHC 32.0 31.5 - 36.5 g/dL    RDW 17.3 (H) 10.0 - 15.0 %    Platelet Count 203 150 - 450 10e9/L    Diff Method Manual Differential     % Neutrophils 87.0 %    % Lymphocytes 3.0 %    % Monocytes 7.0 %    % Eosinophils 0.0 %    % Basophils 0.0 %    % Metamyelocytes 3.0 %    Absolute Neutrophil 41.5 (H) 1.6 - 8.3 10e9/L    Absolute Lymphocytes 1.4 0.8 - 5.3 10e9/L    Absolute Monocytes 3.3 (H) 0.0 - 1.3 10e9/L    Absolute Eosinophils 0.0 0.0 - 0.7 10e9/L    Absolute Basophils 0.0 0.0 - 0.2 10e9/L    Absolute Metamyelocytes 1.4 (H) 0 10e9/L    Anisocytosis Slight     Platelet Estimate       Automated count confirmed.  Platelet morphology is normal.   Basic metabolic panel   Result Value Ref Range    Sodium 130 (L) 133 - 144 mmol/L    Potassium 4.0 3.4 - 5.3 mmol/L    Chloride 93 (L) 94 - 109 mmol/L    Carbon Dioxide 25 20 - 32 mmol/L    Anion Gap 12 3 - 14 mmol/L    Glucose 107 (H) 70 - 99 mg/dL    Urea Nitrogen 49 (H) 7 - 30 mg/dL    Creatinine 2.26 (H) 0.52 - 1.04 mg/dL    GFR Estimate 24 (L) >60 mL/min/[1.73_m2]    GFR Estimate If Black 27 (L) >60 mL/min/[1.73_m2]    Calcium 7.6 (L) 8.5 - 10.1 mg/dL   Lactic acid whole blood   Result Value Ref Range    Lactic Acid 1.2 0.7 - 2.0 mmol/L   TSH   Result Value Ref Range    TSH 5.08 (H) 0.40 - 4.00 mU/L   Hepatic panel   Result Value Ref Range    Bilirubin Direct 0.5 (H) 0.0 - 0.2 mg/dL    Bilirubin Total 0.9 0.2 - 1.3 mg/dL    Albumin 2.1 (L) 3.4 - 5.0 g/dL    Protein Total 7.5 6.8 - 8.8 g/dL    Alkaline Phosphatase 163 (H) 40 - 150 U/L    ALT 89 (H)  0 - 50 U/L     (H) 0 - 45 U/L   XR Chest 2 Views    Narrative    CHEST TWO VIEWS January 30, 2019 1:24 PM     HISTORY: Hypoxia.    COMPARISON: Chest x-ray 11/1/2017.      Impression    IMPRESSION: Two views of the chest are performed. Atelectasis is noted  at the lung bases. Lungs are otherwise clear. Heart is enlarged. This  may have increased slightly since prior exam. No obvious pneumothorax  or pleural effusions.    MIKAELA SALGADO MD   UA with Microscopic   Result Value Ref Range    Color Urine Stacy     Appearance Urine Cloudy     Glucose Urine Negative NEG^Negative mg/dL    Bilirubin Urine Negative NEG^Negative    Ketones Urine Negative NEG^Negative mg/dL    Specific Gravity Urine 1.018 1.003 - 1.035    Blood Urine Moderate (A) NEG^Negative    pH Urine 5.0 5.0 - 7.0 pH    Protein Albumin Urine 100 (A) NEG^Negative mg/dL    Urobilinogen mg/dL 2.0 0.0 - 2.0 mg/dL    Nitrite Urine Negative NEG^Negative    Leukocyte Esterase Urine Large (A) NEG^Negative    Source Catheterized Urine     WBC Urine 104 (H) 0 - 5 /HPF    RBC Urine 49 (H) 0 - 2 /HPF    Bacteria Urine Many (A) NEG^Negative /HPF    Mucous Urine Present (A) NEG^Negative /LPF   Blood culture   Result Value Ref Range    Specimen Description Blood Right Hand     Special Requests Aerobic and anaerobic bottles received     Culture Micro PENDING    Blood culture   Result Value Ref Range    Specimen Description Blood Left Hand     Special Requests Aerobic and anaerobic bottles received     Culture Micro PENDING    Head CT w/o contrast    Narrative    CT SCAN OF THE HEAD WITHOUT CONTRAST   1/30/2019 4:16 PM     HISTORY: Altered level of consciousness (LOC), unexplained    TECHNIQUE:  Axial images of the head and coronal reformations without  IV contrast material. Radiation dose for this scan was reduced using  automated exposure control, adjustment of the mA and/or kV according  to patient size, or iterative reconstruction technique.    COMPARISON:  None.    FINDINGS: Motion artifact degrades the quality of some of these  images. The ventricles are normal in size, shape and configuration.   The brain parenchyma and subarachnoid spaces are normal. There is no  evidence of intracranial hemorrhage, mass, acute infarct or anomaly.  The visualized portions of the sinuses and mastoids appear normal. No  evidence for trauma.      Impression    IMPRESSION: No acute pathology. No bleed, mass, or infarcts.     CT Abdomen Pelvis w/o Contrast    Narrative    CT ABDOMEN AND PELVIS WITHOUT CONTRAST  1/30/2019 4:17 PM     INDICATION: Abdominal pain, unspecified. Leukocytosis, lower abdominal  wall/groin infection.      TECHNIQUE: Thin axial images through the abdomen and pelvis without  contrast. Coronal reformatted images. Radiation dose for this scan was  reduced using automated exposure control, adjustment of the mA and/or  kV according to patient size, or iterative reconstruction technique.    COMPARISON: 9/21/2012.    FINDINGS: Mild left hydronephrosis due to two adjacent proximal  ureteral stones, the largest measuring 1 cm. Small nonobstructing  stones in the lower left kidney. Punctate nonobstructing stone lower  pole right kidney. Cholelithiasis. Mild nonspecific adrenal gland  thickening of doubtful significance. Upper abdominal organs are  otherwise negative without benefit of contrast. Artifact related to a  battery pack in the right anterior abdominal wall.    Minimal opacity in the right middle lobe and at the right base, likely  atelectasis. Slightly more prominent opacity at the left lung base may  be atelectasis or pneumonia.    Uterus is present. Mildly displaced, impacted right femoral neck  fracture, age-indeterminate. This is new since the prior study and  could be recent. Postoperative changes lumbar spine and sacrum.      Impression    IMPRESSION:  1. Mild left hydronephrosis due to two adjacent proximal ureteral  stones measuring up to 1 cm.  2. Mildly  displaced, impacted right femoral neck fracture, age  indeterminate, but could be recent. Correlate clinically.  3. Cholelithiasis. Bilateral renal stones.  4. Mild atelectasis or infiltrate left lung base.    BRENDEN PEREZ MD   Blood gas venous and oxyhgb   Result Value Ref Range    Ph Venous 7.29 (L) 7.32 - 7.43 pH    PCO2 Venous 62 (H) 40 - 50 mm Hg    PO2 Venous 57 (H) 25 - 47 mm Hg    Bicarbonate Venous 30 (H) 21 - 28 mmol/L    FIO2 8 L     Oxyhemoglobin Venous 86 %    Base Excess Venous 1.9 mmol/L   Blood gas arterial and oxyhgb   Result Value Ref Range    pH Arterial 7.29 (L) 7.35 - 7.45 pH    pCO2 Arterial 52 (H) 35 - 45 mm Hg    pO2 Arterial 75 (L) 80 - 105 mm Hg    Bicarbonate Arterial 25 21 - 28 mmol/L    FIO2 30%     Oxyhemoglobin Arterial 90 (L) 92 - 100 %    Base Deficit Art 2.3 mmol/L        All imaging studies reviewed by me.      Attestation:  I have reviewed today's vital signs, notes, medications, labs and imaging.  Total time: 45 minutes critical care time.    Hermilo Yarbrough MD

## 2019-01-31 NOTE — PROGRESS NOTES
TELE ICU note  53F with MS, wheelchair bound living in a SNF    Pt intubated and presented w/ septic shock from presumed urinary source.    Found to have infected renal stones, taken for cysto for stone removal and stenting.    Did well in case per anesthesia  Off pressors, did receive large amount of fluid resuscitation.    On zosyn  Has internal jugular in pace.      Imaging  CT head -  CXR clear perhaps lll atalectasis  CT a/p  1. Mild left hydronephrosis due to two adjacent proximal ureteral stones measuring up to 1 cm.  2. Mildly displaced, impacted right femoral neck fracture, age indeterminate, but could be recent. Correlate clinically. 3. Cholelithiasis. Bilateral renal stones. 4. Mild atelectasis or infiltrate left lung base.  Still intubated    Per nursing, patient has panniculitis.  CT pelvis w/o free air, superficial inflammation seen under the panus w/o fat strandign in the subQ tissues.    Wound care c/s placed, skin swab sent, MRSA swab sent.    Tele ICU will follow pt overnight  -Continue abx, add vancomycin given concern for infection in soft tissues of groin.  May be candidal but defer on nystatin for now until wound care can see and eval.  CT - for soft tissue air/stranding, will monitor site but low concern for nech fas. Currently.    -Pressors/fluids prn,   -F/u cultures to narrow abx,   -F/u post OR blood gas and adjust vent  -Wound care c/s in AM to eval groin    Farhan Garcia MD  Pulmonary & Critical Care Medicine  Pgr: [phone]

## 2019-01-31 NOTE — ANESTHESIA PREPROCEDURE EVALUATION
Anesthesia Pre-Procedure Evaluation    Patient: Amanda Richardson   MRN: [id number] : 1965          Preoperative Diagnosis: left stone    Procedure(s):  COMBINED CYSTOSCOPY, RETROGRADES, URETEROSCOPY, INSERT STENT    Past Medical History:   Diagnosis Date     Abnormality of gait 2012     Chronic pain     [facility] - yearly, next in summer 2018     CKD (chronic kidney disease) stage 1, GFR 90 ml/min or greater      Colon polyps 1/15    tubular adenomas x 2     Gallstone 2012     Hyperlipidemia LDL goal <70      Hypertension goal BP (blood pressure) < 140/90      Leukocytosis 2012     Moderate depressive episode (H)      MS (multiple sclerosis) (H)     Dr Vigil/Gaby - [facility]     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     Tobacco abuse 2012    former     Type 2 diabetes, HbA1c goal < 7% (H)      Past Surgical History:   Procedure Laterality Date     COLONOSCOPY N/A 2015    tubular adenomas x 2 - due 5 yrs     FUSION LUMBAR ANTERIOR, FUSION LUMBAR POSTERIOR TWO LEVELS, COMBINED  10/17/2013    lumbar fusion - Dr Floyd     INSERT PUMP BACLOFEN  2017    intrathecal baclofen pump implantation     IRRIGATION AND DEBRIDEMENT LOWER EXTREMITY, COMBINED Right     Right ankle I&D d/t infection     OPEN REDUCTION INTERNAL FIXATION ANKLE  5/15    Right Bimalleolar ankle fx ORIF     OPEN REDUCTION  INTERNAL FIXATION ANKLE Right 11/2015    Revision due to spasms pulling screws out of ankle     SINUS SURGERY  2011    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     XR LUMBAR EPIDURAL INJECTION INCL IMAGING  3/14    Left L4-5 Epidural Dr Winter     Anesthesia Evaluation     .             ROS/MED HX    ENT/Pulmonary: Comment: Intubated      Neurologic:     (+)Multiple Sclerosis limitations: hand foot numbness,     Cardiovascular:     (+) Dyslipidemia, hypertension----. : . . . :. .       METS/Exercise Tolerance:     Hematologic: Comments: Lab Test        01/30/19 06/20/18 11/01/17      --          10/03/13                       1258          1529          1623           --           1614          WBC          47.7*        15.7*        14.0*          < >        14.4*         HGB          12.9         13.4         13.2           < >        12.7          MCV          81           81           84             < >        81            PLT          203          311          351            < >        318           INR           --           --           --           --          0.93           < > = values in this interval not displayed.                  Lab Test        01/30/19 06/20/18 09/28/17                       1258          1529          1354          NA           130*         141          140           POTASSIUM    4.0          3.8          3.7           CHLORIDE     93*          104          103           CO2          25           26           31            BUN          49*          17           20            CR           2.26*        0.52         0.59          ANIONGAP     12           11           6             MARY          7.6*         8.8          9.1           GLC          107*         101*         150*              (+) Other Hematologic Disorder-Leukocytosis      Musculoskeletal: Comment: Spinal stenosis, lumbar  (+) , fracture lower extremity, -       GI/Hepatic:  - neg GI/hepatic  "ROS       Renal/Genitourinary:     (+) chronic renal disease, type: ARF and CRI, Nephrolithiasis ,       Endo:     (+) type II DM Not using insulin - not using insulin pump not previously admitted for DM/DKA Obesity, .      Psychiatric:     (+) psychiatric history depression      Infectious Disease:   (+) Other Infectious Disease severe sepsis      Malignancy:   (+)   Non Hodgkin's lymphoma        Other:    (+) No chance of pregnancy C-spine cleared: N/A, H/O Chronic Pain,H/O chronic opiod use , other significant disability                         Physical Exam      Airway   Comment: Intubated    Dental     Cardiovascular   Rhythm and rate: abnormal      Pulmonary    breath sounds clear to auscultation            Lab Results   Component Value Date    WBC 47.7 (H) 01/30/2019    HGB 12.9 01/30/2019    HCT 40.3 01/30/2019     01/30/2019    CRP 15.0 (H) 07/01/2015    SED 39 (H) 11/01/2017     (L) 01/30/2019    POTASSIUM 4.0 01/30/2019    CHLORIDE 93 (L) 01/30/2019    CO2 25 01/30/2019    BUN 49 (H) 01/30/2019    CR 2.26 (H) 01/30/2019     (H) 01/30/2019    MARY 7.6 (L) 01/30/2019    ALBUMIN 2.1 (L) 01/30/2019    PROTTOTAL 7.5 01/30/2019    ALT 89 (H) 01/30/2019     (H) 01/30/2019    ALKPHOS 163 (H) 01/30/2019    BILITOTAL 0.9 01/30/2019    PTT 26 10/03/2013    INR 0.93 10/03/2013    TSH 5.08 (H) 01/30/2019    T4 1.19 12/08/2014       Preop Vitals  BP Readings from Last 3 Encounters:   01/30/19 92/55   09/19/18 134/80   07/16/18 124/70    Pulse Readings from Last 3 Encounters:   01/30/19 99   09/19/18 83   07/16/18 86      Resp Readings from Last 3 Encounters:   01/30/19 21   10/02/17 14   09/28/17 14    SpO2 Readings from Last 3 Encounters:   01/30/19 99%   09/19/18 95%   07/16/18 93%      Temp Readings from Last 1 Encounters:   01/30/19 97  F (36.1  C) (Rectal)    Ht Readings from Last 1 Encounters:   09/19/18 1.74 m (5' 8.5\")      Wt Readings from Last 1 Encounters:   09/19/18 111.1 kg (245 " "lb)    Estimated body mass index is 36.71 kg/m  as calculated from the following:    Height as of 9/19/18: 1.74 m (5' 8.5\").    Weight as of 9/19/18: 111.1 kg (245 lb).       Anesthesia Plan      History & Physical Review  History and physical reviewed and following examination; no interval change.    ASA Status:  4 emergent.    NPO Status:  > 8 hours    Plan for General and ETT with Other induction. Maintenance will be Inhalation.    PONV prophylaxis:  Ondansetron (or other 5HT-3) and Dexamethasone or Solumedrol       Postoperative Care  Postoperative pain management:  IV analgesics.      Consents  Anesthetic plan, risks, benefits and alternatives discussed with:  Patient.  Use of blood products discussed: Yes.   Use of blood products discussed with Patient.  Consented to blood products.  .                 Emmanuel Quispe MD                    .  "

## 2019-01-31 NOTE — ED PROVIDER NOTES
Emergency Department Attending Supervision Note  1/30/2019  8:21 PM      I evaluated this patient in conjunction with Fozia Blue PA-C      53-year-old female with history of multiple sclerosis presented to the ED with lethargy, vomiting. The  was the primary historian.  He states that over the last 2-3 days she has been having nausea and vomiting.  She has not had a bowel movement in some time but with the addition of laxative she was able to have a bowel movement.  There is no significant pain or fever at that time.  Then today when he awoke, he noticed that she was more somnolent.  She does have a history of multiple sclerosis but he states that she has no chronic deficits related to this.  He later divulged that she did have a fall last night when she slid out of the wheelchair.  History seems to change at times.  He initially reported to the mid-level that she is primarily wheelchair dependent but he informed me that she is often ambulatory.    I was made aware of the case after the patient had the majority of her workup.  [facility] had been discussing the case with Dr. Dasilva although not formally until I resumed care and supervision for Dr. Dasilva.    It was reported that the patient has a urinary tract infection with marked leukocytosis, normal lactate, a degree of respiratory acidosis and acute kidney injury.  CT scan of the abdomen and pelvis had revealed a left proximal ureteral stone with mild necrosis and possible left lower lobe pneumonia.        On my exam,    General:   Ill appearing somnolent female  HEENT:    Oropharynx is dry  Eyes:    Conjunctiva normal  Neck:    Supple, no meningismus.     CV:     Regular rate and rhythm.      No murmurs, rubs or gallops.    PULM:    Rales at the bases     Poor air exchange     No respiratory distress.      No wheezing or stridor.  ABD:    Soft, obese, non-tender, non-distended.       No pulsatile masses.       No rebound, guarding or rigidity.  MSK:      No gross deformity to all four extremities.   LYMPH:   No cervical lymphadenopathy.  NEURO:   Somnolent.     GCS E2 M6 V4 =12     Oriented to self alone     Strength globally depressed yet symmetric     No tremor  Skin:    Warm, dry and intact.       Erythematous, foul smelling patch to the inguinal area bilateral      Scattered areas of skin bridging to the groin concerning for chronic adhesions        MDM:    1.  Severe sepsis: Patient has urinary tract infection as a primary source.  Unfortunately this is complicated by left ureteral stone.  Patient was initially given ceftriaxone.  Lactate was within normal limits.  She does have acute kidney injury signaling severe sepsis with end organ damage.  Patient was given IV fluids.  Urology was contacted and there is plan for surgical intervention for infected kidney stone with severe sepsis.     In addition there was a lower lobe pneumonia noted on abdominal CT.  It is possible this is related to aspiration secondary to patient's somnolence.  Patient was given azithromycin as well as Unasyn for possible aspiration pneumonia.  Blood cultures currently pending.    2.  Respiratory insufficiency: This is likely due to infectious encephalopathy and a degree of obstructive sleep apnea.  Patient had respiratory acidosis on initial VBG.  Patient was given a trial of BiPAP in which she had mild improvement of her mentation although not returned to baseline.  ABG ordered by admitting provider which is mildly improved.  Due to her developing hypotension, I felt that she required a central line.  It would be difficult to place a central line in this obtunded patient while on BiPAP thus patient intubated.  She was intubated without difficulty.  Patient stable on ventilator settings at the time of transfer to the operating room.    3.  Hypotension: Clearly this is related to the severe sepsis.  Patient given a total of 3 L of IV fluids in the emergency department.  Blood  pressure remains borderline with MAP primary between 60 and 65.   My expectation is that her course will remain complicated is at high risk for developing hypotension.  I decided to place a right internal jugular central line in event for the need of vasopressor therapy.  Central line placement uncomplicated. MAP now > 65 after right internal jugular placed thus no immediate need for vasopressors.    4.  Acute kidney injury: Related to #1.  Patient was resuscitated with IV fluids.  We will continue to trend renal function.    5.  Right femoral neck fracture: This was incidentally noted on her abdominal CT scan.  Significant other did not initially report a fall and due to the patient's confusion, there was a delay in diagnosis.  Orthopedic surgery consulted but is not priority due to urosepsis and need for urologic intervention.    Total critical care time excluding procedures: 80 minutes    The patient has signs of Severe Sepsis as evidenced by:    1. 2 SIRS criteria, AND  2. Suspected infection, AND   3. Organ dysfunction: ARF with Cr >2 due to infection    Time severe sepsis diagnosis confirmed = 1448 as this was the time when UA resulted signaling infection      3 Hour Severe Sepsis Bundle Completion:  1. Initial Lactic Acid Result:   Recent Labs   Lab Test 01/30/19  1258 01/22/17  1325 01/21/17  0323   LACT 1.2 0.7 0.6*     2. Blood Cultures before Antibiotics: Yes  3. Broad Spectrum Antibiotics Administered: Yes     Anti-infectives (From now, onward)    None        4. 3000 ml of IV fluids.  Patient weight not recorded    Severe Sepsis reassessment:  1. Repeat Lactic Acid Level: not repeated as initial lactic normal and did not want to delay transfer to OR  2. MAP>65 after initial IVF bolus, will continue to monitor fluid status and vital signs  I attest to having performed a repeat sepsis exam and assessment of perfusion at 1930 and the results demonstrate no change.           Diagnosis  (N17.9) JULIANNE (acute  kidney injury) (H)    (N13.2) Hydronephrosis with urinary obstruction due to ureteral calculus    (S72.001A) Closed fracture of neck of right femur, initial encounter (H)    (R41.82) Altered mental status    (A41.9,  R65.20) Severe sepsis (H)    (G93.49,  B99.9) Infectious encephalopathy    (R06.89) Respiratory insufficiency      MD Randell Hays Jeremiah R, MD  01/30/19 2042

## 2019-01-31 NOTE — PHARMACY-VANCOMYCIN DOSING SERVICE
Pharmacy Vancomycin Initial Note  Date of Service 2019  Patient's  1965  53 year old, female    Indication: Sepsis and Skin and Soft Tissue Infection    Current estimated CrCl = Estimated Creatinine Clearance: 37.3 mL/min (A) (based on SCr of 2.26 mg/dL (H)).    Creatinine for last 3 days  2019: 12:58 PM Creatinine 2.26 mg/dL    Recent Vancomycin Level(s) for last 3 days  No results found for requested labs within last 72 hours.      Vancomycin IV Administrations (past 72 hours)      No vancomycin orders with administrations in past 72 hours.                Nephrotoxins and other renal medications (From now, onward)    Start     Dose/Rate Route Frequency Ordered Stop    19 2330  vancomycin (VANCOCIN) 2,000 mg in sodium chloride 0.9 % 250 mL intermittent infusion      2,000 mg (central catheter)  over 60 Minutes Intravenous EVERY 24 HOURS 19 2130  piperacillin-tazobactam (ZOSYN) infusion 3.375 g     Comments:  Pharmacy can adjust dose based on renal function.    3.375 g  100 mL/hr over 30 Minutes Intravenous EVERY 6 HOURS 19  norepinephrine (LEVOPHED) 16 mg in D5W 250 mL infusion      0.03-0.4 mcg/kg/min × 127.6 kg  3.6-47.9 mL/hr  Intravenous CONTINUOUS 19            Contrast Orders - past 72 hours (72h ago, onward)    Start     Dose/Rate Route Frequency Ordered Stop    19  ioversol 68% (OPTIRAY 320) 50 mL + sterile water 50 mL injection  Status:  Discontinued        PRN 19                Plan:  1.  Start vancomycin  2000 mg IV q24h.   2.  Goal Trough Level: 15-20 mg/L   3.  Pharmacy will check trough levels as appropriate in 1-3 Days.    4. Serum creatinine levels will be ordered daily for the first week of therapy and at least twice weekly for subsequent weeks.    5. Narrows method utilized to dose vancomycin therapy: Method 1    Jabier Carrasco

## 2019-01-31 NOTE — PHARMACY-ADMISSION MEDICATION HISTORY
Admission medication history interview status for this patient is complete. See [facility] admission navigator for allergy information, prior to admission medications and immunization status.     Medication history interview source(s):Patient's   Medication history resources (including written lists, pill bottles, clinic record):[facility] list  [facility] [place]    Changes made to PTA medication list:  Added: None  Deleted: None  Changed: cymbalta    Actions taken by pharmacist (provider contacted, etc):called      Additional medication history information:None    Medication reconciliation/reorder completed by provider prior to medication history? Yes    Do you take OTC medications (eg tylenol, ibuprofen, fish oil, eye/ear drops, etc)? Y(Y/N)    For patients on insulin therapy: N (Y/N)    Prior to Admission medications    Medication Sig Last Dose Taking? Auth Provider   amitriptyline (ELAVIL) 100 MG tablet Take 1 tablet (100 mg) by mouth At Bedtime Past Week at Unknown time Yes Julius Hassan MD   ASPIRIN PO Take 162 mg by mouth daily Past Week at Unknown time Yes Unknown, Entered By History   atorvastatin (LIPITOR) 10 MG tablet Take 1 tablet (10 mg) by mouth daily Past Week at Unknown time Yes Julius Hassan MD   baclofen (LIORESAL) 10 MG tablet Take 1 tablet (10 mg) by mouth 3 times daily May take an additional 10 mg as needed qd for total of 40 mg per day Past Week at Unknown time Yes Julius Hassan MD   cholecalciferol 1000 units TABS Take 1,000 Units by mouth daily Past Week at Unknown time Yes Unknown, Entered By History   DULoxetine (CYMBALTA) 60 MG capsule Take 60 mg by mouth 2 times daily  Yes Unknown, Entered By History   gabapentin (NEURONTIN) 600 MG tablet TAKE ONE TABLET BY MOUTH THREE TIMES DAILY AND MAY TAKE ONE ADDITIONAL AS NEEDED Past Week at Unknown time Yes Julius Hassan MD   losartan-hydrochlorothiazide (HYZAAR) 100-25 MG tablet TAKE ONE TABLET BY MOUTH ONCE DAILY Past Week at Unknown  time Yes Julius Hassan MD   metFORMIN (GLUCOPHAGE) 500 MG tablet Take 1 tablet (500 mg) by mouth daily (with dinner) Past Week at Unknown time Yes Julius Hassan MD   metoprolol succinate (TOPROL-XL) 50 MG 24 hr tablet TAKE 1 TABLET BY MOUTH ONCE DAILY Past Week at Unknown time Yes Julius Hassan MD   Multiple Vitamin (MULTI-VITAMIN PO) Take 1 tablet by mouth daily  Past Week at Unknown time Yes Reported, Patient   omega-3 fatty acids (FISH OIL) 1200 MG capsule Take 1 capsule by mouth daily. Past Week at Unknown time Yes Reported, Patient   oxyCODONE IR (ROXICODONE) 15 MG tablet Take 0.5-1 tablets (7.5-15 mg) by mouth every 4 hours as needed for moderate to severe pain Limit 4 tablet(s) per day. Past Week at Unknown time Yes Julius Hassan MD

## 2019-01-31 NOTE — CONSULTS
CLINICAL NUTRITION SERVICES  -  ASSESSMENT NOTE      Recommendations Ordered by Registered Dietitian (RD):   Peptamen Intense VHP at 10 mL/hr  Fluid flush 60 mL q4 hours  P04 add on  Certavite   Future/Additional Recommendations:    Total TF rate pending kcal from propofol and tolerance    Micronutrient supplementation per pressure injury protocol once wounds staged   Malnutrition:   Unable to determine due to incomplete diet and weight history     REASON FOR ASSESSMENT  Amanda Richardson is a 53 year old female seen by the dietitian for Provider Order - Registered Dietitian to Assess and Order TF per Medical Nutrition protocol     History of advanced MS (able to pivot) who resides at home with her .  She was brought to the ED by EMS due to weakness and apparent increased somnolence. Also with history of chronic back pain for which she is on Oxycodone 15 mg q 4 hours, HTN, morbid obesity, DM II, remote tobacco abuse and T-cell lymphoma that has been successfully treated in 2011.     NUTRITION HISTORY    Information obtained from chart review - family not at bedside    Food allergies/intolerances: NKFA    No advanced care directive per chart review    Per RD/CDE 10/2018, diet recall:     Cultural/Confucianist diet restrictions?: No    Meal planning:  (eat every 4-5 hours)    Meals include: Breakfast, Dinner, Snacks    Breakfast: breakfast sandwich ([place]), maybe a small glass of milk OR banana and yogurt    Lunch: smaller meal    Dinner: late - fish OR tacos OR pasta OR steak/vegetable, sometimes with a starch -- water to drink, maybe diet coke     Snacks: 3pm - cheese and crackers Or smoked salmon and crackers    Beverages: Water, Diet soda, Tea    Has patient met with a dietitian in the past?: No    Home Glucose (Sugar) Monitoring: 3-4 times per week    Blood glucose trend: No change    Low Glucose Range (mg/dL): 70-90    High Glucose Range (mg/dL): 110-130    CURRENT NUTRITION ORDERS  - Diet: NPO x 1  "day, intubated    PHYSICAL FINDINGS  Observed  Adiposity masking baseline lean body mass stores; mild temporal scooping; edema baseline per chart review  Obtained from Chart/Interdisciplinary Team  Issac nutrition score: 2; total score: 10  Skin: L/R groin with mulitple wounds, WOCN consult    ANTHROPOMETRICS  Height: 5' 4\"  Weight: 126 kg   Body mass index is 47.76 kg/m .  Weight Status:  Obesity Grade III BMI >40  Ideal body weight: 54.5 kg +/- 10%, 231% of IBW   Weight History:  Weight has increased in last 4 months, as noted below - ?fluid component  Wt Readings from Last 10 Encounters:   01/31/19 126.2 kg (278 lb 3.5 oz)   09/19/18 111.1 kg (245 lb)   07/16/18 117.9 kg (260 lb)   06/13/18 117.9 kg (260 lb)   02/12/18 117.9 kg (260 lb)   11/01/17 113.4 kg (250 lb)   10/17/17 113.4 kg (250 lb)   10/02/17 113.4 kg (250 lb)   09/28/17 113.4 kg (250 lb)   08/18/17 109.3 kg (241 lb)       ASSESSED NUTRITION NEEDS (PER APPROVED PRACTICE GUIDELINES, Dosing weight: 126 kg admit wt for energy, 54.5 kg IBW for protein):  Estimated Energy Needs: 3539-3839 kcals (11-14 Kcal/Kg)  Justification: obese and vented  Estimated Protein Needs: >109 grams protein (>2 g pro/Kg)  Justification: hypercatabolism with critical illness  Estimated Fluid Needs: >1 mL/Kcal  Justification: maintenance    LABS  Labs reviewed  Lab Results   Component Value Date    URINEKETONE Negative 01/30/2019     Recent Labs   Lab Test 01/31/19  1023 01/30/19  1258 06/20/18  1529 09/28/17  1354 04/10/17  1601   POTASSIUM 3.3* 4.0 3.8 3.7 3.7     No results for input(s): PHOS in the last 71935 hours.  Recent Labs   Lab Test 01/31/19  1023   MAG 2.3     Recent Labs   Lab Test 01/31/19  1023 01/30/19  1258 06/20/18  1529 09/28/17  1354 04/10/17  1601    130* 141 140 139     Recent Labs   Lab Test 01/31/19  1023 01/31/19  0520 01/30/19  1258 06/20/18  1529 09/28/17  1354   CR 1.09* 1.46* 2.26* 0.52 0.59     Recent Labs   Lab 01/31/19  1023 01/30/19  1258 "   * 107*     Lab Results   Component Value Date    A1C 6.4 01/30/2019    A1C 6.8 06/20/2018    A1C 6.5 10/02/2017    A1C 6.6 05/02/2016       MEDICATIONS  Medications reviewed  LR IVF at 125 mL/hr  Levophed  Fentanyl  Propofol gtt, currently at 53.6 mL/hr to provide 1415 kcal daily    PROCEDURES WITH NUTRITIONAL IMPLICATIONS  1/30: intubated, cystoscopy, stent placement; OG (xray confirmation)    MALNUTRITION:  % Weight Loss: Unable to determine  % Intake: Unable to determine  Subcutaneous Fat Loss: None observed   Muscle Loss: Mild temporal scooping, baseline LBM masked by adiposity  Fluid Retention: Unable to determine acute changes vs chronic/baseline    Malnutrition Diagnosis: Unable to determine due to incomplete diet and weight history    NUTRITION DIAGNOSIS:  Inadequate protein intake related to NPO, intubated and kcal from propofol as evidenced by meeting 0% of estimated protein needs and 100% of estimated energy needs x 1 day.    INTERVENTIONS  Recommendations / Nutrition Prescription  Start Peptamen Intense VHP at 10 mL/hr and hold; advancement to goal rate pending tolerance and kcal form propofol on 2/1:     Type of Feeding Tube: OG     Enteral Frequency:  Continuous    Enteral Regimen: Peptamen Intense VHP at 50 mL/hr    Total Enteral Provisions: 1200 mL provides 1200 kcal, 112 g protein, 1008 ml free H2O, 94 g CHO and 5 g Fiber daily.     Meets < 100% of DRI's --> Certavite, and additional micronutrient supplementation once wounds staged by WOCN    Free Water Flush: standard 60 mL q4 hours    Daily electrolyte check, P04 add on    Implementation  Nutrition education: Not appropriate at this time due to patient condition  EN Composition, EN Schedule and Feeding Tube Flush: Entered orders to reflect regimen outlined above  Biochemical data: P04 add on  Collaboration and Referral of care: Discussed patient during interdisciplinary care rounds this morning    Goals  TF to meet % of estimated  nutrition needs in the next 48 hours      MONITORING AND EVALUATION:  Progress towards goals will be monitored and evaluated per protocol and Practice Guidelines      Sia Langley RDN, LD, CNSC  Pager - 3rd floor/ICU: [phone]  Pager - All other floors: [phone]  Pager - Weekend/holiday: [phone]  Office: [phone]

## 2019-01-31 NOTE — ED PROVIDER NOTES
"[facility] Procedure Note          Intubation:     Date/Time: 10:02 PM  Performed by: Jose Mejias    The procedure was performed in an emergent situation.  Risks and benefits: risks, benefits and alternatives were discussed.  Patient identity confirmed: verbally with patient and arm band  Time out: Immediately prior to procedure a \"time out\" was called to verify the correct patient, procedure, equipment, support staff and site/side marked as required.    Indication: Respiratory insufficiency and encephalopathy    Intubation method: Glidescope  Patient status: RSI  Preoxygenation: Mask  Pretreatment medications: None  Sedatives: Etomidate  Paralytic: succinylcholine  Laryngoscope size: Glidescope 4  Tube size: 7.5 cuffed with cuff inflated after placement  Number of attempts: 1  Cricoid pressure: yes  Cords visualized: yes  Post-procedure assessment: Breath sounds equal bilaterally with chest rise and absent over the epigastrium, Chest x-ray interpreted by me demonstrating endotracheal tube in appropriate position and CO2 detector.  ETT to teeth: 24 cm  Tube secured with: ETT ikm    Patient tolerated the procedure well with no immediate complications.  Complications:  None         Jose Mejias MD  01/30/19 2203    "

## 2019-01-31 NOTE — PROGRESS NOTES
Stable on ventilator, on pressors  Making clear urine  Cr 1.46  Will need stone extraction in several weeks - no urgency with stent in place

## 2019-01-31 NOTE — ED NOTES
[facility]  ED Nurse Handoff Report    Amanda Richardson is a 53 year old female   ED Chief complaint: Leg Pain  . ED Diagnosis:   Final diagnoses:   JULIANNE (acute kidney injury) (H)   Hydronephrosis with urinary obstruction due to ureteral calculus   Closed fracture of neck of right femur, initial encounter (H)   Altered mental status     Allergies:   Allergies   Allergen Reactions     Lisinopril      Lip swelling     Cyclobenzaprine Other (See Comments)     Per 4-10-17 H&P by Yanna Dugan PA-C.     Flexeril [Cyclobenzaprine Hcl]      Got confused        Code Status: Full Code  Activity level - Baseline/Home:  Stand with Assist of 2. Activity Level - Current:   Total Care. Lift room needed: Yes. Bariatric: No   Needed: No   Isolation: No. Infection: Not Applicable.     Vital Signs:   Vitals:    01/30/19 1730 01/30/19 1745 01/30/19 1805 01/30/19 1810   BP: 96/47 101/57 91/55    Pulse: 92 91 108    Resp:       Temp:       TempSrc:       SpO2:    100%       Cardiac Rhythm:  ,      Pain level:    Patient confused: No. Patient Falls Risk: Yes.   Elimination Status: Has voided   Patient Report - Initial Complaint: Pt arrives via EMS from home d/t bilateral leg pain for past 3-4 days. Denies injury/trauma. Pt with chronic leg pain. Pt sating upper 80s on RA. Pt placed on 4-6 L O2 with improvement. CMS intact. Pt falling asleep while answering questions. Pt had to be sternal rubbed to sign for EMS. No IV established. ABC intact. Pt with hx MS. Focused Assessment: Pt answers one word answers after stimuli to sternm. Orientated to person,  and hospital. Not oriented to time. Pt given narcan IN on arrival since no IV placed. Pt with minimal change after administration. Pt c/o bilateral leg pain. CMS intact. LE with chronic skin changes. Inguinal erythema and drainage. Pt tolerated cath UA without c/o pain. Intra-abd pump for baclofen for MS.  Tests Performed: labs, cts, xrays. Abnormal Results:    CT Abdomen Pelvis w/o Contrast   Final Result   IMPRESSION:   1. Mild left hydronephrosis due to two adjacent proximal ureteral   stones measuring up to 1 cm.   2. Mildly displaced, impacted right femoral neck fracture, age   indeterminate, but could be recent. Correlate clinically.   3. Cholelithiasis. Bilateral renal stones.   4. Mild atelectasis or infiltrate left lung base.      BRENDEN PEREZ MD      Head CT w/o contrast   Preliminary Result   IMPRESSION: No acute pathology. No bleed, mass, or infarcts.         XR Chest 2 Views   Final Result   IMPRESSION: Two views of the chest are performed. Atelectasis is noted   at the lung bases. Lungs are otherwise clear. Heart is enlarged. This   may have increased slightly since prior exam. No obvious pneumothorax   or pleural effusions.      MIKAELA SALGADO MD        Labs Ordered and Resulted from Time of ED Arrival Up to the Time of Departure from the ED   CBC WITH PLATELETS DIFFERENTIAL - Abnormal; Notable for the following components:       Result Value    WBC 47.7 (*)     MCH 25.9 (*)     RDW 17.3 (*)     Absolute Neutrophil 41.5 (*)     Absolute Monocytes 3.3 (*)     Absolute Metamyelocytes 1.4 (*)     All other components within normal limits   BASIC METABOLIC PANEL - Abnormal; Notable for the following components:    Sodium 130 (*)     Chloride 93 (*)     Glucose 107 (*)     Urea Nitrogen 49 (*)     Creatinine 2.26 (*)     GFR Estimate 24 (*)     GFR Estimate If Black 27 (*)     Calcium 7.6 (*)     All other components within normal limits   TSH - Abnormal; Notable for the following components:    TSH 5.08 (*)     All other components within normal limits   ROUTINE UA WITH MICROSCOPIC - Abnormal; Notable for the following components:    Blood Urine Moderate (*)     Protein Albumin Urine 100 (*)     Leukocyte Esterase Urine Large (*)     WBC Urine 104 (*)     RBC Urine 49 (*)     Bacteria Urine Many (*)     Mucous Urine Present (*)     All other components  within normal limits   BLOOD GAS VENOUS AND OXYHGB - Abnormal; Notable for the following components:    Ph Venous 7.29 (*)     PCO2 Venous 62 (*)     PO2 Venous 57 (*)     Bicarbonate Venous 30 (*)     All other components within normal limits   HEPATIC PANEL - Abnormal; Notable for the following components:    Bilirubin Direct 0.5 (*)     Albumin 2.1 (*)     Alkaline Phosphatase 163 (*)     ALT 89 (*)      (*)     All other components within normal limits   LACTIC ACID WHOLE BLOOD   BLOOD CULTURE   BLOOD CULTURE     Treatments provided: see MAR, BiPap  Family Comments:  at bedside for a while, went home for the night.  OBS brochure/video discussed/provided to patient:  N/A  ED Medications:   Medications   azithromycin (ZITHROMAX) 500 mg in sodium chloride 0.9 % 250 mL intermittent infusion (500 mg Intravenous New Bag 1/30/19 1750)   0.9% sodium chloride BOLUS (1,000 mLs Intravenous New Bag 1/30/19 1750)   ampicillin-sulbactam (UNASYN) 3 g vial to attach to  mL bag (3 g Intravenous New Bag 1/30/19 1756)   0.9% sodium chloride BOLUS (0 mLs Intravenous Stopped 1/30/19 1735)   naloxone (NARCAN) injection 0.4 mg (0.2 mg Intravenous Given 1/30/19 1220)   cefTRIAXone (ROCEPHIN) 1 g vial to attach to  mL bag for ADULTS or NS 50 mL bag for PEDS (0 g Intravenous Stopped 1/30/19 1735)   haloperidol lactate (HALDOL) injection 2 mg (2 mg Intravenous Given 1/30/19 1610)     Drips infusing:  Yes  For the majority of the shift, the patient's behavior Green. Interventions performed were n/a.     Severe Sepsis OR Septic Shock Diagnosis Present: No      ED Nurse Name/Phone Number: Jamari Vides,   6:14 PM

## 2019-01-31 NOTE — CONSULTS
[facility]    Orthopedic Consultation    Amanda Richardson MRN# [id number]   Age: 53 year old YOB: 1965     Date of Admission:  1/30/2019    Reason for consult: Right hip fracture (age indeterminate)       Requesting physician: Hermilo Yarbrough MD       Level of consult: One-time consult to assist in determining a diagnosis and to recommend an appropriate treatment plan           Assessment and Plan:   Assessment:   Right femoral neck fracture seen on CT (age indeterminate)      Plan:   Non-operative treatment recommended at this time due to patient's limited ambulatory status,  hydradenitis fungal infection with tracking wounds within the right groin and that this does not appear to be an acute fracture.  Continue to WB as she was prior to admission.   Follow-up in 3-4 weeks with ortho  Dr Tang discussed the treatment plan and was agreed upon with patient's .             Chief Complaint:   Bilateral leg pain (chronic)         History of Present Illness:   This patient is a 53 year old female who presents with the following condition requiring a hospital admission:  Per ED note:  Amanda Richardson is being seen today with concerns regarding a right hip femoral neck fracture seen on CT scan last evening in the ED.    Per patient , she has a  history of chronic leg pain. Last evening, The patient stated the pain was located to bilateral legs from hip to foot. Within the past four days her pain had worsened.  Her primary care provider at [facility] prescribes her with oxycodone and has a pain contract with them.    Per , the patient was recently sick with vomiting and abdominal pain and it seemed like it was better. He reports that the patient has been Increasing weak an fatigue and was having difficulty getting up on her own. The  states the patient is not taking more pain medications than prescribed. Per , the patient has frequent urinary  tract infection. The  also reports that the patient slid out of her wheel chair a few days ago.              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     Tobacco abuse 06/11/2012    former     Type 2 diabetes, HbA1c goal < 7% (H)              Past Surgical History:     Past Surgical History:   Procedure Laterality Date     COLONOSCOPY N/A 1/7/2015    tubular adenomas x 2 - due 5 yrs     FUSION LUMBAR ANTERIOR, FUSION LUMBAR POSTERIOR TWO LEVELS, COMBINED  10/17/2013    lumbar fusion - Dr Floyd     INSERT PUMP BACLOFEN  04/2017    intrathecal baclofen pump implantation     IRRIGATION AND DEBRIDEMENT LOWER EXTREMITY, COMBINED Right 2015    Right ankle I&D d/t infection     OPEN REDUCTION INTERNAL FIXATION ANKLE  5/15    Right Bimalleolar ankle fx ORIF     OPEN REDUCTION INTERNAL FIXATION ANKLE Right 11/2015     Revision due to spasms pulling screws out of ankle     SINUS SURGERY      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     XR LUMBAR EPIDURAL INJECTION INCL IMAGING  3/14    Left L4-5 Epidural Dr Winter             Social History:     Social History     Tobacco Use     Smoking status: Current Every Day Smoker     Packs/day: 0.50     Types: Cigarettes     Last attempt to quit: 2013     Years since quittin.4     Smokeless tobacco: Never Used     Tobacco comment: since age 17   Substance Use Topics     Alcohol use: No             Family History:     Family History   Problem Relation Age of Onset     C.A.D. Father         with CHF      Cancer Father         ? unsure type - abdominal      Hypertension Mother      Thyroid Disease Mother         goiter      Breast Cancer Sister 58     Thyroid Disease Son      Cancer - colorectal No family hx of              Immunizations:     VACCINE/DOSE   Diptheria   DPT   DTAP   HBIG   Hepatitis A   Hepatitis B   HIB   Influenza   Measles   Meningococcal   MMR   Mumps   Pneumococcal   Polio   Rubella   Small Pox   TDAP   Varicella   Zoster             Allergies:     Allergies   Allergen Reactions     Lisinopril      Lip swelling     Cyclobenzaprine Other (See Comments)     Per 4-10-17 H&P by Yanna Dugan PA-C.     Flexeril [Cyclobenzaprine Hcl]      Got confused              Medications:     Current Facility-Administered Medications   Medication     amitriptyline (ELAVIL) tablet 100 mg     aspirin (ASA) chewable tablet 162 mg     atorvastatin (LIPITOR) tablet 10 mg     baclofen (LIORESAL) tablet 10 mg     dextrose 10 % 1,000 mL infusion     glucose gel 15-30 g    Or     dextrose 50 % injection 25-50 mL    Or     glucagon injection 1 mg     DULoxetine (CYMBALTA) EC capsule 60 mg     famotidine (PEPCID) injection 20 mg     fentaNYL (PF) (SUBLIMAZE) injection 25 mcg     fentaNYL (SUBLIMAZE) infusion     gabapentin (NEURONTIN) tablet 600 mg     insulin aspart (NovoLOG)  inj (RAPID ACTING)     lactated ringers infusion     magnesium sulfate 2 g in NS intermittent infusion ([facility]ar[facility] or [facility])     magnesium sulfate 4 g in 100 mL sterile water (premade)     [START ON 2/1/2019] multivitamins w/minerals (CERTAVITE) liquid 15 mL     naloxone (NARCAN) injection 0.1-0.4 mg     norepinephrine (LEVOPHED) 16 mg in D5W 250 mL infusion     ondansetron (ZOFRAN-ODT) ODT tab 4 mg    Or     ondansetron (ZOFRAN) injection 4 mg     Patient RECEIVING antibiotic to treat a different condition and it provides ADEQUATE COVERAGE for this surgical procedure.     piperacillin-tazobactam (ZOSYN) infusion 3.375 g     potassium chloride (KLOR-CON) Packet 20-40 mEq     potassium chloride 10 mEq in 100 mL intermittent infusion with 10 mg lidocaine     potassium chloride 10 mEq in 100 mL sterile water intermittent infusion (premix)     potassium chloride 20 mEq in 50 mL intermittent infusion     potassium chloride ER (K-DUR/KLOR-CON M) CR tablet 20-40 mEq     propofol (DIPRIVAN) infusion     vancomycin (VANCOCIN) 2,250 mg in sodium chloride 0.9 % 250 mL intermittent infusion             Review of Systems:             Physical Exam:   All vitals have been reviewed  Patient Vitals for the past 24 hrs:   BP Temp Temp src Pulse Heart Rate Resp SpO2 Height Weight   01/31/19 1216 -- -- -- -- -- -- 99 % -- --   01/31/19 1200 -- 98.7  F (37.1  C) Axillary -- -- -- -- -- --   01/31/19 1145 102/54 -- -- 114 116 19 99 % -- --   01/31/19 1130 107/49 -- -- 115 117 19 99 % -- --   01/31/19 1115 107/71 -- -- 117 121 20 99 % -- --   01/31/19 1100 118/58 -- -- 117 130 19 100 % -- --   01/31/19 1045 117/53 -- -- 130 138 19 99 % -- --   01/31/19 1030 108/52 -- -- 158 172 20 97 % -- --   01/31/19 1015 (!) 122/36 -- -- 125 147 18 100 % -- --   01/31/19 1000 (!) 119/20 -- -- 135 197 26 97 % -- --   01/31/19 0945 103/50 -- -- 160 197 (!) 7 97 % -- --   01/31/19 0930 122/49 -- -- 144 176 20 97 % -- --   01/31/19 0915 112/40 -- -- 154  138 20 99 % -- --   01/31/19 0900 112/47 -- -- 109 108 17 98 % -- --   01/31/19 0830 104/49 -- -- 105 106 20 98 % -- --   01/31/19 0815 103/50 -- -- 105 106 20 98 % -- --   01/31/19 0800 102/48 -- -- 108 109 21 99 % -- --   01/31/19 0757 -- -- -- -- -- -- 99 % -- --   01/31/19 0745 101/42 -- -- 109 105 20 100 % -- --   01/31/19 0730 97/44 99.2  F (37.3  C) Axillary 115 117 20 98 % -- --   01/31/19 0715 101/48 -- -- 113 121 -- 98 % -- --   01/31/19 0700 105/52 -- -- 113 118 -- 98 % -- --   01/31/19 0645 106/49 -- -- 112 117 -- 98 % -- --   01/31/19 0630 113/53 -- -- 110 109 -- 98 % -- --   01/31/19 0615 98/54 -- -- 105 106 -- 97 % -- 126.2 kg (278 lb 3.5 oz)   01/31/19 0600 91/53 99.2  F (37.3  C) Axillary 105 107 -- 97 % -- --   01/31/19 0530 110/64 -- -- 112 112 -- 99 % -- --   01/31/19 0515 119/52 -- -- 113 112 -- 99 % -- --   01/31/19 0500 104/49 -- -- 113 114 -- 99 % -- --   01/31/19 0445 111/53 -- -- 112 113 -- 99 % -- --   01/31/19 0430 105/53 -- -- 114 113 -- 98 % -- --   01/31/19 0415 99/48 -- -- 115 118 -- 98 % -- --   01/31/19 0408 -- -- -- 117 -- 20 98 % -- --   01/31/19 0400 106/52 -- -- 114 114 -- 97 % -- --   01/31/19 0345 104/56 98  F (36.7  C) Axillary 116 121 -- 98 % -- --   01/31/19 0330 116/56 -- -- 113 114 -- 95 % -- --   01/31/19 0315 114/62 -- -- 114 113 -- 96 % -- --   01/31/19 0306 93/43 -- -- 111 112 -- 98 % -- --   01/31/19 0305 92/43 -- -- 112 111 -- 98 % -- --   01/31/19 0302 (!) 85/42 -- -- 112 113 -- 99 % -- --   01/31/19 0300 (!) 88/42 -- -- 115 113 -- 99 % -- --   01/31/19 0255 95/40 -- -- 123 122 -- 98 % -- --   01/31/19 0200 113/56 -- -- 97 98 -- 97 % -- --   01/31/19 0145 110/53 -- -- 96 97 -- 97 % -- --   01/31/19 0130 106/54 -- -- 93 95 -- 97 % -- --   01/31/19 0115 113/55 -- -- 89 88 -- 97 % -- --   01/31/19 0100 107/52 -- -- 88 91 -- 98 % -- --   01/31/19 0045 101/51 -- -- 89 89 -- 97 % -- --   01/31/19 0030 105/54 -- -- 94 95 -- 97 % -- --   01/31/19 0015 107/56 -- -- 98 96 --  "96 % -- --   01/31/19 0000 132/65 -- -- 94 87 -- 96 % -- --   01/30/19 2345 116/55 97.5  F (36.4  C) Axillary 92 93 -- 95 % -- --   01/30/19 2330 109/54 -- -- 93 93 -- 95 % -- --   01/30/19 2315 104/63 -- -- 92 96 -- 96 % -- --   01/30/19 2310 -- -- -- 84 84 20 96 % -- --   01/30/19 2300 96/49 -- -- 78 88 -- 96 % -- --   01/30/19 2251 98/52 -- -- -- 86 -- 98 % -- --   01/30/19 2200 98/60 -- -- 92 93 -- 92 % -- --   01/30/19 2150 95/53 -- -- -- -- -- -- -- --   01/30/19 2127 -- -- -- -- -- -- -- 1.626 m (5' 4\") --   01/30/19 2103 -- -- -- -- -- -- -- -- 127.6 kg (281 lb 4.9 oz)   01/30/19 2045 -- -- -- -- -- -- 95 % -- --   01/30/19 1945 93/48 -- -- 94 96 14 97 % -- --   01/30/19 1940 91/44 -- -- 96 98 13 96 % -- --   01/30/19 1935 91/47 -- -- 100 102 14 95 % -- --   01/30/19 1930 117/63 -- -- 112 111 18 94 % -- --   01/30/19 1925 (!) 88/49 -- -- 94 96 18 94 % -- --   01/30/19 1920 (!) 89/48 -- -- 98 98 14 94 % -- --   01/30/19 1915 (!) 89/50 -- -- 98 96 12 99 % -- --   01/30/19 1910 (!) 86/47 -- -- 98 98 13 99 % -- --   01/30/19 1900 -- -- -- -- 98 12 99 % -- --   01/30/19 1855 -- -- -- -- -- -- 99 % -- --   01/30/19 1850 92/55 -- -- 99 -- -- -- -- --   01/30/19 1845 92/54 -- -- 110 -- -- 100 % -- --   01/30/19 1815 90/52 -- -- -- -- -- -- -- --   01/30/19 1810 -- -- -- -- -- -- 100 % -- --   01/30/19 1805 91/55 -- -- 108 -- -- (!) 85 % -- --   01/30/19 1745 101/57 -- -- 91 -- -- -- -- --   01/30/19 1730 96/47 -- -- 92 -- -- -- -- --   01/30/19 1726 -- -- -- -- -- -- 97 % -- --   01/30/19 1652 90/46 -- -- -- -- -- 97 % -- --   01/30/19 1650 90/46 -- -- -- -- -- 97 % -- --   01/30/19 1630 -- -- -- 98 -- -- -- -- --   01/30/19 1620 97/48 -- -- -- -- -- 95 % -- --   01/30/19 1600 99/40 -- -- 100 -- -- 94 % -- --   01/30/19 1533 128/78 -- -- 109 109 21 96 % -- --   01/30/19 1515 -- -- -- -- -- -- (!) 88 % -- --   01/30/19 1514 -- -- -- -- 100 -- -- -- --   01/30/19 1506 -- -- -- -- 93 17 (!) 86 % -- --   01/30/19 1500 " -- -- -- -- 95 12 (!) 89 % -- --   01/30/19 1457 -- -- -- -- 94 9 (!) 87 % -- --   01/30/19 1449 -- 97  F (36.1  C) Rectal -- -- -- -- -- --       Intake/Output Summary (Last 24 hours) at 1/31/2019 1227  Last data filed at 1/31/2019 1200  Gross per 24 hour   Intake 4009.78 ml   Output 4200 ml   Net -190.22 ml     Patient on ventilator at time of exam.  Fungal infection evaluated within the right groin.  Wounds present with tracking.            Data:   All laboratory data reviewed  Results for orders placed or performed during the hospital encounter of 01/30/19   XR Chest 2 Views    Narrative    CHEST TWO VIEWS January 30, 2019 1:24 PM     HISTORY: Hypoxia.    COMPARISON: Chest x-ray 11/1/2017.      Impression    IMPRESSION: Two views of the chest are performed. Atelectasis is noted  at the lung bases. Lungs are otherwise clear. Heart is enlarged. This  may have increased slightly since prior exam. No obvious pneumothorax  or pleural effusions.    MIKAELA SALGADO MD   Head CT w/o contrast    Narrative    CT SCAN OF THE HEAD WITHOUT CONTRAST   1/30/2019 4:16 PM     HISTORY: Altered level of consciousness (LOC), unexplained    TECHNIQUE:  Axial images of the head and coronal reformations without  IV contrast material. Radiation dose for this scan was reduced using  automated exposure control, adjustment of the mA and/or kV according  to patient size, or iterative reconstruction technique.    COMPARISON: None.    FINDINGS: Motion artifact degrades the quality of some of these  images. The ventricles are normal in size, shape and configuration.   The brain parenchyma and subarachnoid spaces are normal. There is no  evidence of intracranial hemorrhage, mass, acute infarct or anomaly.  The visualized portions of the sinuses and mastoids appear normal. No  evidence for trauma.      Impression    IMPRESSION: No acute pathology. No bleed, mass, or infarcts.      MURTAZA FREITAS MD   CT Abdomen Pelvis w/o Contrast    Narrative    CT  ABDOMEN AND PELVIS WITHOUT CONTRAST  1/30/2019 4:17 PM     INDICATION: Abdominal pain, unspecified. Leukocytosis, lower abdominal  wall/groin infection.      TECHNIQUE: Thin axial images through the abdomen and pelvis without  contrast. Coronal reformatted images. Radiation dose for this scan was  reduced using automated exposure control, adjustment of the mA and/or  kV according to patient size, or iterative reconstruction technique.    COMPARISON: 9/21/2012.    FINDINGS: Mild left hydronephrosis due to two adjacent proximal  ureteral stones, the largest measuring 1 cm. Small nonobstructing  stones in the lower left kidney. Punctate nonobstructing stone lower  pole right kidney. Cholelithiasis. Mild nonspecific adrenal gland  thickening of doubtful significance. Upper abdominal organs are  otherwise negative without benefit of contrast. Artifact related to a  battery pack in the right anterior abdominal wall.    Minimal opacity in the right middle lobe and at the right base, likely  atelectasis. Slightly more prominent opacity at the left lung base may  be atelectasis or pneumonia.    Uterus is present. Mildly displaced, impacted right femoral neck  fracture, age-indeterminate. This is new since the prior study and  could be recent. Postoperative changes lumbar spine and sacrum.      Impression    IMPRESSION:  1. Mild left hydronephrosis due to two adjacent proximal ureteral  stones measuring up to 1 cm.  2. Mildly displaced, impacted right femoral neck fracture, age  indeterminate, but could be recent. Correlate clinically.  3. Cholelithiasis. Bilateral renal stones.  4. Mild atelectasis or infiltrate left lung base.    BRENDEN PEREZ MD   XR Surgery LUCILA L/T 5 Min Fluoro w Stills    Narrative    This exam was marked as non-reportable because it will not be read by a   radiologist or a [facility] non-radiologist provider.             Chest  XR, 1 view PORTABLE    Narrative    CHEST ONE VIEW PORTABLE  1/30/2019  7:40 PM     COMPARISON: Two-view chest x-ray same day    HISTORY: After tube placement      Impression    IMPRESSION: There has been interval placement of an endotracheal tube  with its tip at the level of the clavicular heads. There has been  interval placement of a right internal jugular central venous catheter  with its tip likely in the mid superior vena cava, although evaluation  is limited due to suboptimal image quality. Patchy airspace opacity in  the left lung base possibly representing atelectasis is again noted.  The right lung appears clear of airspace opacities, although there is  questionable interstitial prominence in the right lung that may  represent pulmonary edema. No pneumothorax. No definite pleural  effusion. Heart size remains moderately enlarged.    LUAN CARRILLO MD   XR Abdomen 1 View    Narrative    ABDOMEN ONE VIEW  1/30/2019 10:07 PM     COMPARISON: None    HISTORY: Orogastric tube placement.      Impression    IMPRESSION: Tip of the orogastric tube is in the gastric fundus. A  left ureteral stent is noted. Posterior spine fusion hardware from L4  to the pelvis is noted. A probable pain infusion pump is implanted  over the right lower quadrant. Abdominal bowel gas pattern is  nonspecific. There is no evidence for free intraperitoneal air.    IMPRESSION: No evidence for bowel obstruction or free air.    LUAN CARRILLO MD   XR Chest Port 1 View    Narrative    CHEST PORTABLE ONE VIEW   1/31/2019 5:46 AM     HISTORY: Post OR.    COMPARISON: 1/30/2019      Impression    IMPRESSION: There is increased left lower lobe consolidation, likely  atelectasis. Lung volumes are otherwise unchanged. Tubes and lines are  unchanged. Heart size is unchanged.    PARMINDER GOMEZ MD   CBC with platelets differential   Result Value Ref Range    WBC 47.7 (H) 4.0 - 11.0 10e9/L    RBC Count 4.99 3.8 - 5.2 10e12/L    Hemoglobin 12.9 11.7 - 15.7 g/dL    Hematocrit 40.3 35.0 - 47.0 %    MCV 81 78 - 100 fl    MCH  25.9 (L) 26.5 - 33.0 pg    MCHC 32.0 31.5 - 36.5 g/dL    RDW 17.3 (H) 10.0 - 15.0 %    Platelet Count 203 150 - 450 10e9/L    Diff Method Manual Differential     % Neutrophils 87.0 %    % Lymphocytes 3.0 %    % Monocytes 7.0 %    % Eosinophils 0.0 %    % Basophils 0.0 %    % Metamyelocytes 3.0 %    Absolute Neutrophil 41.5 (H) 1.6 - 8.3 10e9/L    Absolute Lymphocytes 1.4 0.8 - 5.3 10e9/L    Absolute Monocytes 3.3 (H) 0.0 - 1.3 10e9/L    Absolute Eosinophils 0.0 0.0 - 0.7 10e9/L    Absolute Basophils 0.0 0.0 - 0.2 10e9/L    Absolute Metamyelocytes 1.4 (H) 0 10e9/L    Anisocytosis Slight     Platelet Estimate       Automated count confirmed.  Platelet morphology is normal.   Basic metabolic panel   Result Value Ref Range    Sodium 130 (L) 133 - 144 mmol/L    Potassium 4.0 3.4 - 5.3 mmol/L    Chloride 93 (L) 94 - 109 mmol/L    Carbon Dioxide 25 20 - 32 mmol/L    Anion Gap 12 3 - 14 mmol/L    Glucose 107 (H) 70 - 99 mg/dL    Urea Nitrogen 49 (H) 7 - 30 mg/dL    Creatinine 2.26 (H) 0.52 - 1.04 mg/dL    GFR Estimate 24 (L) >60 mL/min/[1.73_m2]    GFR Estimate If Black 27 (L) >60 mL/min/[1.73_m2]    Calcium 7.6 (L) 8.5 - 10.1 mg/dL   Lactic acid whole blood   Result Value Ref Range    Lactic Acid 1.2 0.7 - 2.0 mmol/L   TSH   Result Value Ref Range    TSH 5.08 (H) 0.40 - 4.00 mU/L   UA with Microscopic   Result Value Ref Range    Color Urine Stacy     Appearance Urine Cloudy     Glucose Urine Negative NEG^Negative mg/dL    Bilirubin Urine Negative NEG^Negative    Ketones Urine Negative NEG^Negative mg/dL    Specific Gravity Urine 1.018 1.003 - 1.035    Blood Urine Moderate (A) NEG^Negative    pH Urine 5.0 5.0 - 7.0 pH    Protein Albumin Urine 100 (A) NEG^Negative mg/dL    Urobilinogen mg/dL 2.0 0.0 - 2.0 mg/dL    Nitrite Urine Negative NEG^Negative    Leukocyte Esterase Urine Large (A) NEG^Negative    Source Catheterized Urine     WBC Urine 104 (H) 0 - 5 /HPF    RBC Urine 49 (H) 0 - 2 /HPF    Bacteria Urine Many (A)  NEG^Negative /HPF    Mucous Urine Present (A) NEG^Negative /LPF   Blood gas venous and oxyhgb   Result Value Ref Range    Ph Venous 7.29 (L) 7.32 - 7.43 pH    PCO2 Venous 62 (H) 40 - 50 mm Hg    PO2 Venous 57 (H) 25 - 47 mm Hg    Bicarbonate Venous 30 (H) 21 - 28 mmol/L    FIO2 8 L     Oxyhemoglobin Venous 86 %    Base Excess Venous 1.9 mmol/L   Hepatic panel   Result Value Ref Range    Bilirubin Direct 0.5 (H) 0.0 - 0.2 mg/dL    Bilirubin Total 0.9 0.2 - 1.3 mg/dL    Albumin 2.1 (L) 3.4 - 5.0 g/dL    Protein Total 7.5 6.8 - 8.8 g/dL    Alkaline Phosphatase 163 (H) 40 - 150 U/L    ALT 89 (H) 0 - 50 U/L     (H) 0 - 45 U/L   Blood gas arterial and oxyhgb   Result Value Ref Range    pH Arterial 7.29 (L) 7.35 - 7.45 pH    pCO2 Arterial 52 (H) 35 - 45 mm Hg    pO2 Arterial 75 (L) 80 - 105 mm Hg    Bicarbonate Arterial 25 21 - 28 mmol/L    FIO2 30%     Oxyhemoglobin Arterial 90 (L) 92 - 100 %    Base Deficit Art 2.3 mmol/L   Hemoglobin A1c   Result Value Ref Range    Hemoglobin A1C 6.4 (H) 0 - 5.6 %   Blood gas arterial with oxyhemoglobin (AM Draw)   Result Value Ref Range    pH Arterial 7.28 (L) 7.35 - 7.45 pH    pCO2 Arterial 55 (H) 35 - 45 mm Hg    pO2 Arterial 108 (H) 80 - 105 mm Hg    Bicarbonate Arterial 26 21 - 28 mmol/L    FIO2 80%     Oxyhemoglobin Arterial 96 92 - 100 %    Base Deficit Art 1.8 mmol/L   Glucose by meter   Result Value Ref Range    Glucose 98 70 - 99 mg/dL   Blood gas arterial with oxyhemoglobin (AM Draw)   Result Value Ref Range    pH Arterial 7.37 7.35 - 7.45 pH    pCO2 Arterial 44 35 - 45 mm Hg    pO2 Arterial 102 80 - 105 mm Hg    Bicarbonate Arterial 26 21 - 28 mmol/L    FIO2 70%     Oxyhemoglobin Arterial 96 92 - 100 %    Base Deficit Art 0.1 mmol/L   Creatinine   Result Value Ref Range    Creatinine 1.46 (H) 0.52 - 1.04 mg/dL    GFR Estimate 40 (L) >60 mL/min/[1.73_m2]    GFR Estimate If Black 46 (L) >60 mL/min/[1.73_m2]   Glucose by meter   Result Value Ref Range    Glucose 99 70 -  99 mg/dL   Glucose by meter   Result Value Ref Range    Glucose 129 (H) 70 - 99 mg/dL   CBC (AM Draw)   Result Value Ref Range    WBC 32.6 (H) 4.0 - 11.0 10e9/L    RBC Count 4.57 3.8 - 5.2 10e12/L    Hemoglobin 11.9 11.7 - 15.7 g/dL    Hematocrit 37.3 35.0 - 47.0 %    MCV 82 78 - 100 fl    MCH 26.0 (L) 26.5 - 33.0 pg    MCHC 31.9 31.5 - 36.5 g/dL    RDW 17.4 (H) 10.0 - 15.0 %    Platelet Count 178 150 - 450 10e9/L   Basic metabolic panel (PCU Collect TBD)   Result Value Ref Range    Sodium 138 133 - 144 mmol/L    Potassium 3.3 (L) 3.4 - 5.3 mmol/L    Chloride 102 94 - 109 mmol/L    Carbon Dioxide 26 20 - 32 mmol/L    Anion Gap 10 3 - 14 mmol/L    Glucose 139 (H) 70 - 99 mg/dL    Urea Nitrogen 41 (H) 7 - 30 mg/dL    Creatinine 1.09 (H) 0.52 - 1.04 mg/dL    GFR Estimate 57 (L) >60 mL/min/[1.73_m2]    GFR Estimate If Black 66 >60 mL/min/[1.73_m2]    Calcium 7.6 (L) 8.5 - 10.1 mg/dL   Magnesium (Add on or recollect)   Result Value Ref Range    Magnesium 2.3 1.6 - 2.3 mg/dL   Phosphorus   Result Value Ref Range    Phosphorus 3.7 2.5 - 4.5 mg/dL   EKG 12-lead, tracing only   Result Value Ref Range    Interpretation ECG Click View Image link to view waveform and result    Nutrition Services Adult IP Consult    Narrative    Sia Langley RD, LD     1/31/2019 11:18 AM  CLINICAL NUTRITION SERVICES  -  ASSESSMENT NOTE    Recommendations Ordered by Registered Dietitian (RD):   Peptamen Intense VHP at 10 mL/hr  Fluid flush 60 mL q4 hours  P04 add on  Certavite   Future/Additional Recommendations:    Total TF rate pending kcal from propofol and tolerance    Micronutrient supplementation per pressure injury protocol once   wounds staged   Malnutrition:   Unable to determine due to incomplete diet and weight history     REASON FOR ASSESSMENT  Amanda Richardson is a 53 year old female seen by the dietitian   for Provider Order - Registered Dietitian to Assess and Order TF   per Medical Nutrition protocol     History of advanced  "MS (able to pivot) who resides at home with   her .  She was brought to the ED by EMS due to weakness   and apparent increased somnolence. Also with history of chronic   back pain for which she is on Oxycodone 15 mg q 4 hours, HTN,   morbid obesity, DM II, remote tobacco abuse and T-cell lymphoma   that has been successfully treated in 2011.     NUTRITION HISTORY    Information obtained from chart review - family not at bedside    Food allergies/intolerances: NKFA    No advanced care directive per chart review    Per RD/CDE 10/2018, diet recall:     Cultural/Buddhism diet restrictions?: No    Meal planning:  (eat every 4-5 hours)    Meals include: Breakfast, Dinner, Snacks    Breakfast: breakfast sandwich ([employer]), maybe a small   glass of milk OR banana and yogurt    Lunch: smaller meal    Dinner: late - fish OR tacos OR pasta OR steak/vegetable,   sometimes with a starch -- water to drink, maybe diet coke     Snacks: 3pm - cheese and crackers Or smoked salmon and crackers    Beverages: Water, Diet soda, Tea    Has patient met with a dietitian in the past?: No    Home Glucose (Sugar) Monitoring: 3-4 times per week    Blood glucose trend: No change    Low Glucose Range (mg/dL): 70-90    High Glucose Range (mg/dL): 110-130    CURRENT NUTRITION ORDERS  - Diet: NPO x 1 day, intubated    PHYSICAL FINDINGS  Observed  Adiposity masking baseline lean body mass stores; mild temporal   scooping; edema baseline per chart review  Obtained from Chart/Interdisciplinary Team  Issac nutrition score: 2; total score: 10  Skin: L/R groin with mulitple wounds, WOCN consult    ANTHROPOMETRICS  Height: 5' 4\"  Weight: 126 kg   Body mass index is 47.76 kg/m .  Weight Status:  Obesity Grade III BMI >40  Ideal body weight: 54.5 kg +/- 10%, 231% of IBW   Weight History:  Weight has increased in last 4 months, as noted below - ?fluid   component  Wt Readings from Last 10 Encounters:   01/31/19 126.2 kg (278 lb 3.5 oz)   09/19/18 " 111.1 kg (245 lb)   07/16/18 117.9 kg (260 lb)   06/13/18 117.9 kg (260 lb)   02/12/18 117.9 kg (260 lb)   11/01/17 113.4 kg (250 lb)   10/17/17 113.4 kg (250 lb)   10/02/17 113.4 kg (250 lb)   09/28/17 113.4 kg (250 lb)   08/18/17 109.3 kg (241 lb)       ASSESSED NUTRITION NEEDS (PER APPROVED PRACTICE GUIDELINES,   Dosing weight: 126 kg admit wt for energy, 54.5 kg IBW for   protein):Estimated Energy Needs: 7049-7379 kcals (11-14 Kcal/Kg)  Justification: obese and vented  Estimated Protein Needs: >109 grams protein (>2 g pro/Kg)  Justification: hypercatabolism with critical illness  Estimated Fluid Needs: >1 mL/Kcal  Justification: maintenance    LABS  Labs reviewed  Lab Results   Component Value Date    URINEKETONE Negative 01/30/2019     Recent Labs   Lab Test 01/31/19  1023 01/30/19  1258 06/20/18  1529 09/28/17  1354 04/10/17  1601   POTASSIUM 3.3* 4.0 3.8 3.7 3.7     No results for input(s): PHOS in the last 02848 hours.  Recent Labs   Lab Test 01/31/19  1023   MAG 2.3     Recent Labs   Lab Test 01/31/19  1023 01/30/19  1258 06/20/18  1529 09/28/17  1354 04/10/17  1601    130* 141 140 139     Recent Labs   Lab Test 01/31/19  1023 01/31/19  0520 01/30/19  1258 06/20/18  1529 09/28/17  1354   CR 1.09* 1.46* 2.26* 0.52 0.59     Recent Labs   Lab 01/31/19  1023 01/30/19  1258   * 107*     Lab Results   Component Value Date    A1C 6.4 01/30/2019    A1C 6.8 06/20/2018    A1C 6.5 10/02/2017    A1C 6.6 05/02/2016       MEDICATIONS  Medications reviewed  LR IVF at 125 mL/hr  Levophed  Fentanyl  Propofol gtt, currently at 53.6 mL/hr to provide 1415 kcal daily    PROCEDURES WITH NUTRITIONAL IMPLICATIONS  1/30: intubated, cystoscopy, stent placement; OG (xray   confirmation)  MALNUTRITION:  % Weight Loss: Unable to determine  % Intake: Unable to determine  Subcutaneous Fat Loss: None observed   Muscle Loss: Mild temporal scooping, baseline LBM masked by   adiposity  Fluid Retention: Unable to determine acute  changes vs   chronic/baseline    Malnutrition Diagnosis: Unable to determine due to incomplete   diet and weight history  NUTRITION DIAGNOSIS:  Inadequate protein intake related to NPO, intubated and kcal from   propofol as evidenced by meeting 0% of estimated protein needs   and 100% of estimated energy needs x 1 day.    INTERVENTIONSRecommendations / Nutrition Prescription  Start Peptamen Intense VHP at 10 mL/hr and hold; advancement to   goal rate pending tolerance and kcal form propofol on 2/1:     Type of Feeding Tube: OG     Enteral Frequency:  Continuous    Enteral Regimen: Peptamen Intense VHP at 50 mL/hr    Total Enteral Provisions: 1200 mL provides 1200 kcal, 112 g   protein, 1008 ml free H2O, 94 g CHO and 5 g Fiber daily.     Meets < 100% of DRI's --> Certavite, and additional   micronutrient supplementation once wounds staged by WOCN    Free Water Flush: standard 60 mL q4 hours    Daily electrolyte check, P04 add on    Implementation  Nutrition education: Not appropriate at this time due to patient   condition  EN Composition, EN Schedule and Feeding Tube Flush: Entered   orders to reflect regimen outlined above  Biochemical data: P04 add on  Collaboration and Referral of care: Discussed patient during   interdisciplinary care rounds this morning    Goals  TF to meet % of estimated nutrition needs in the next 48   hours      MONITORING AND EVALUATION:  Progress towards goals will be monitored and evaluated per   protocol and Practice Guidelines      Sia Langley, ANDRAE, LD, CNSC  Pager - 3rd floor/ICU: [phone]  Pager - All other floors: [phone]  Pager - Weekend/holiday: [phone]  Office: [phone]   Blood culture   Result Value Ref Range    Specimen Description Blood Left Hand     Special Requests Aerobic and anaerobic bottles received     Culture Micro (A)      Cultured on the 1st day of incubation:  Gram negative bacilli      Culture Micro       Critical Value/Significant Value,  preliminary result only, called to and read back by  Maricarmen Woods  RN @0732 01/31/19 gd     Blood culture   Result Value Ref Range    Specimen Description Blood Right Hand     Special Requests Aerobic and anaerobic bottles received     Culture Micro (A)      Cultured on the 1st day of incubation:  Gram negative rods      Culture Micro       Critical Value/Significant Value, preliminary result only, called to and read back by  SHAMAR PEREZ RN @0634 1/31/19. SCG      Culture Micro       (Note)  POSITIVE for E.COLI by Verigene multiplex nucleic acid test. Final  identification and antimicrobial susceptibility testing will be  verified by standard methods. Verigene test will not distinguish  E.coli from Shigella species including S.dysenteriae, S.flexneri,  S.boydii, and S.sonnei. Specimens containing Shigella species or  E.coli will be reported as Positive for E.coli.    Specimen tested with Verigene multiplex, gram-negative blood culture  nucleic acid test for the following targets: Acinetobacter sp.,  Citrobacter sp., Enterobacter sp., Proteus sp., E. coli, K.  pneumoniae/oxytoca, P. aeruginosa, and the following resistance  markers: CTXM, KPC, NDM, VIM, IMP and OXA.    Critical Value/Significant Value called to and read back by MARICARMEN ORTEZ RN [facility] 0844 1.31.19      Urine Culture Aerobic Bacterial   Result Value Ref Range    Specimen Description Catheterized Urine     Special Requests Specimen received in preservative     Culture Micro Culture in progress    Methicillin Resist/Sens S. aureus PCR   Result Value Ref Range    Specimen Description Nares     Methicillin Resist/Sens S. aureus PCR Negative NEG^Negative   Wound Culture Aerobic Bacterial   Result Value Ref Range    Specimen Description Groin LEFT     Special Requests Specimen collected in eSwab transport (white cap)     Culture Micro PENDING    Gram stain   Result Value Ref Range    Specimen Description Groin LEFT     Special Requests Specimen  collected in eSwab transport (white cap)     Gram Stain Many  Gram negative rods   (A)     Gram Stain Many  Gram negative cocci   (A)     Gram Stain Many  Gram positive cocci   (A)     Gram Stain (A)      Moderate  Gram positive bacilli resembling diphtheroids      Gram Stain Many  WBC'S seen  Many  predominantly PMN's      Wound Culture Aerobic Bacterial   Result Value Ref Range    Specimen Description Groin RIGHT     Special Requests Specimen collected in eSwab transport (white cap)     Culture Micro PENDING    Gram stain   Result Value Ref Range    Specimen Description Groin RIGHT     Special Requests Specimen collected in eSwab transport (white cap)     Gram Stain Moderate  Gram positive cocci   (A)     Gram Stain Moderate  Gram negative cocci   (A)     Gram Stain Moderate  Gram negative rods   (A)     Gram Stain (A)      Few  Gram positive bacilli resembling diphtheroids      Gram Stain Many  WBC'S seen  Many  PMNs seen      Sputum Culture Aerobic Bacterial   Result Value Ref Range    Specimen Description Sputum Endotracheal     Culture Micro PENDING    Gram stain   Result Value Ref Range    Specimen Description Sputum Endotracheal     Gram Stain PENDING           Attestation:  I have reviewed today's vital signs, notes, medications, labs and imaging with Dr. Tang.  Amount of time performed on this consult: 20 minutes.    Radha Danielle PA-C

## 2019-01-31 NOTE — CONSULTS
Urology Consult History and Physical    Name: Amanda Richardson    MRN: [id number]   YOB: 1965       We were asked to see Amanda Richardson at the request of ER staff for evaluation and treatment of LEFT obstructing ureteral stones with associated sepsis from a urinary source.          Chief Complaint:   Change in mental status, vomiting    Unable to obtain a history from the patient due to intubation  History obtained from the medical record          History of Present Illness:   Amanda Richardson is a 53 year old female who is currently intubated and sedated in the ER with plans for urgent transport to the OR for LEFT ureteral stent placement.     She has history of chronic back pain for which she is on oxycodone 15mg q4h, HTN, morbid obestiy, DM II, t-cell lymphoma s/p treatment. Her  noted increased somnolence over the past few days and was difficult to arose which prompted ER transport by EMS. In the ER she was difficult to arouse. She was noted to have a leukocytosis to 43k, SCr to 2.25, and CT scan revealed obstructing LEFT proximal ureteral stone with associated hydronephrosis. She was also noted to have a RIGHT hip fracture of unknown duration.     She ultimately required intubation and was intubated and sedated on my exam in the ER.            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     Tobacco abuse 2012    former     Type 2 diabetes, HbA1c goal < 7% (H)             Past Surgical History:     Past Surgical History:   Procedure Laterality Date     COLONOSCOPY N/A 2015    tubular adenomas x 2 - due 5 yrs     FUSION LUMBAR ANTERIOR, FUSION LUMBAR POSTERIOR TWO LEVELS, COMBINED  10/17/2013    lumbar fusion - Dr Floyd     INSERT PUMP BACLOFEN  2017    intrathecal baclofen pump implantation     IRRIGATION AND DEBRIDEMENT LOWER EXTREMITY, COMBINED Right 2015    Right ankle I&D d/t infection     OPEN REDUCTION INTERNAL FIXATION ANKLE  5/15    Right Bimalleolar ankle fx ORIF     OPEN REDUCTION INTERNAL FIXATION ANKLE Right 2015    Revision due to spasms pulling screws out of ankle     SINUS SURGERY      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     XR LUMBAR EPIDURAL INJECTION INCL IMAGING  3/14    Left L4-5 Epidural Dr Winter            Social History:     Social History     Tobacco Use     Smoking status: Current Every Day Smoker     Packs/day: 0.50     Types: Cigarettes     Last attempt to quit: 2013     Years since quittin.4     Smokeless tobacco: Never Used     Tobacco comment: since age 17   Substance Use Topics     Alcohol use: No            Family History:     Family History   Problem Relation Age of Onset     C.A.D. Father         with CHF      Cancer Father         ? unsure type - abdominal      Hypertension Mother      Thyroid Disease Mother         goiter       Breast Cancer Sister 58     Thyroid Disease Son      Cancer - colorectal No family hx of               Allergies:     Allergies   Allergen Reactions     Lisinopril      Lip swelling     Cyclobenzaprine Other (See Comments)     Per 4-10-17 H&P by Yanna Dugan PA-C.     Flexeril [Cyclobenzaprine Hcl]      Got confused             Medications:     No current facility-administered medications for this encounter.      Facility-Administered Medications Ordered in Other Encounters   Medication     lactated ringers infusion     rocuronium (ZEMURON) injection             Review of Systems:    ROS: Unable to complete given intubation and sedation          Physical Exam:     Patient Vitals for the past 24 hrs:   BP Temp Temp src Pulse Heart Rate Resp SpO2   01/30/19 1945 93/48 -- -- 94 96 14 97 %   01/30/19 1940 91/44 -- -- 96 98 13 96 %   01/30/19 1935 91/47 -- -- 100 102 14 95 %   01/30/19 1930 117/63 -- -- 112 111 18 94 %   01/30/19 1925 (!) 88/49 -- -- 94 96 18 94 %   01/30/19 1920 (!) 89/48 -- -- 98 98 14 94 %   01/30/19 1915 (!) 89/50 -- -- 98 96 12 99 %   01/30/19 1910 (!) 86/47 -- -- 98 98 13 99 %   01/30/19 1900 -- -- -- -- 98 12 99 %   01/30/19 1855 -- -- -- -- -- -- 99 %   01/30/19 1850 92/55 -- -- 99 -- -- --   01/30/19 1845 92/54 -- -- 110 -- -- 100 %   01/30/19 1815 90/52 -- -- -- -- -- --   01/30/19 1810 -- -- -- -- -- -- 100 %   01/30/19 1805 91/55 -- -- 108 -- -- (!) 85 %   01/30/19 1745 101/57 -- -- 91 -- -- --   01/30/19 1730 96/47 -- -- 92 -- -- --   01/30/19 1726 -- -- -- -- -- -- 97 %   01/30/19 1652 90/46 -- -- -- -- -- 97 %   01/30/19 1650 90/46 -- -- -- -- -- 97 %   01/30/19 1630 -- -- -- 98 -- -- --   01/30/19 1620 97/48 -- -- -- -- -- 95 %   01/30/19 1600 99/40 -- -- 100 -- -- 94 %   01/30/19 1533 128/78 -- -- 109 109 21 96 %   01/30/19 1515 -- -- -- -- -- -- (!) 88 %   01/30/19 1514 -- -- -- -- 100 -- --   01/30/19 1506 -- -- -- -- 93 17 (!) 86 %   01/30/19 1500 -- -- -- -- 95 12 (!) 89 %    01/30/19 1457 -- -- -- -- 94 9 (!) 87 %   01/30/19 1449 -- 97  F (36.1  C) Rectal -- -- -- --   01/30/19 1221 92/81 98.7  F (37.1  C) Temporal -- 94 12 90 %       General: morbidly obese, ill appearing, intubated and sedated  HEENT: intubated and sedated   Lungs: Intubated   Heart: Normal rate and rhythm   Abdomen: soft, morbidly obese  : External genitalia with some erythema  Lymph: bilateral pedal edema  Skin: no suspicious lesions or rashes  Neuro: Intubated and sedated          Data:   All laboratory data reviewed:    Recent Labs   Lab 01/30/19  1258   WBC 47.7*   HGB 12.9        Recent Labs   Lab 01/30/19  1258   *   POTASSIUM 4.0   CHLORIDE 93*   CO2 25   BUN 49*   CR 2.26*   *   MARY 7.6*     Recent Labs   Lab 01/30/19  1448   COLOR Stacy   APPEARANCE Cloudy   URINEGLC Negative   URINEBILI Negative   URINEKETONE Negative   SG 1.018   URINEPH 5.0   PROTEIN 100*   NITRITE Negative   LEUKEST Large*   RBCU 49*   WBCU 104*       All pertinent imaging reviewed:    All imaging studies reviewed by me.  I personally reviewed these imaging films.  A formal report from radiology will follow.    IMPRESSION:  1. Mild left hydronephrosis due to two adjacent proximal ureteral  stones measuring up to 1 cm.  2. Mildly displaced, impacted right femoral neck fracture, age  indeterminate, but could be recent. Correlate clinically.  3. Cholelithiasis. Bilateral renal stones.  4. Mild atelectasis or infiltrate left lung base.         Impression and Plan:   Impression:   Amanda Richardson is a 53 year old female who is currently intubated and sedated in the ER with plans for urgent transport to the OR for LEFT ureteral stent placement.     She has history of chronic back pain for which she is on oxycodone 15mg q4h, HTN, morbid obestiy, DM II, t-cell lymphoma s/p treatment. Her  noted increased somnolence over the past few days and was difficult to arose which prompted ER transport by EMS. In the ER  she was difficult to arouse. She was noted to have a leukocytosis to 43k, SCr to 2.25, and CT scan revealed obstructing LEFT proximal ureteral stone with associated hydronephrosis. She was also noted to have a RIGHT hip fracture of unknown duration.       Plan:   LEFT obstructing ureteral stones with sepsis for a urinary source  - Urgent transport to OR for cystoscopy, LEFT Retrograde Pyelogram and LEFT ureteral stent placement  - Abx given in ER  - I spoke with the  via phone and explained the procedure, answered all questions, and obtained consent which was confirmed with RN staff    RIGHT hip fracture  - I discussed with the on call Orthopedic surgeon who confirmed that positioning in dorsal lithotomy for a short duration would not worsening her hip fracture or complicate the treatment of this fracture     Epifanio Sapp   Urology  [facility]  Clinic Phone [phone]

## 2019-01-31 NOTE — PHARMACY-CONSULT NOTE
Pharmacy Tube Feeding Consult    Medication reviewed for administration by feeding tube and for potential food/drug interactions.    Recommend to change the following medications: Famotidine IV to ranitidine syrup; HOLD duloxetine; routes of medications updated accordingly.    Pharmacy will continue to follow as new medications are ordered.      Davis Lancaster, PharmD./PGY-1 Resident    2/2 Changed docusate to liquid.   Sarai Lucero, PHarmD/CDE

## 2019-01-31 NOTE — CONSULTS
Care Transition Initial Assessment - SW     Met with: Family  Spoke with Fernando on the phone  Active Problems:    Acute hypercapnic respiratory failure (H)       DATA  Lives With: spouse    House setting. PT has walk in shower   Quality of Family Relationships: involved, supportive  Description of Support System: Supportive, Involved  Who is your support system?:   Support Assessment: Adequate family and caregiver support, Adequate social supports. Prior to admission pt was Independent at home,. Able to care for self, able to be home alone   Identified issues/concerns regarding health management: has H.O MS, has had fractures in the past. Has W/C but able to Pivot transfer and care for self      Resources List: Skilled Nursing Facility  Has been to [facility] in the past.      Quality of Family Relationships: involved, supportive     ASSESSMENT  SW following for d.c planning support. Currently intubated in the ICU. Has an old fracture that does not require surgical intervention. Pt has walk in shower with shower chair at home and her W.C> PT does not drive. Spouse does work full time outside the home  PT has WOC RN following and will need NG feeding at this time   PLAN  Spouse is aware pending length of stay and needs pt most likely will need TCU  PT has [employer] and will need prior auth for TCU placement if recommendation for d.c planning support'

## 2019-01-31 NOTE — PROGRESS NOTES
[facility]    Hospitalist Progress Note  Name: Amanda Richardson    MRN: [id number]  Provider: Ирина Montemayor MD  Date of Service: 01/31/2019    Assessment & Plan   Summary of Stay: Amanda Richardson is a 53 year old female with past medical history significant for advanced MS, diabetes mellitus, hypertension, history of T-cell lymphoma status post treatment, chronically elevated WBC count, history of recurrent UTIs who presented with altered mental status increased weakness and somnolence.  Evaluation in the emergency room showed significant leukocytosis, hypotension, acute renal failure, obstructing left kidney stone with moderate hydronephrosis and respiratory failure with acidosis with hypercapnia.  She was admitted was admitted on 1/30/2019 with septic shock.  Patient received IV antibiotics and fluid resuscitation in the emergency room and underwent emergent cystoscopy and left retrograde pyelogram with stent placement on 1/30/2019.  CT also showed closed fracture of right femur.  She was initially placed on BiPAP for respiratory failure but perioperatively was intubated. she was admitted to ICU for further evaluation.    Septic shock likely due to urinary tract source (altered mental status, acute renal failure, hypotension, leukocytosis, acidosis, respiratory failure)  --CT scan on admission showed obstructing stone with hydronephrosis and acute renal failure  --Likely secondary to infected kidney stone  --Status post cystoscopy and stent placement 1/30/2019.  Per report patient did have purulent drainage noted during cystoscopy.  --Fluid resuscitation in the emergency room  --IV antibiotic started zosyn to cover for possible E. coli infection.  She also received a azithromycin for possible aspiration pneumonia as noted on CT  --Cultures sent    Acute renal failure with left hydronephrosis  --Secondary to obstructive uropathy.  Creatinine on admission 2.25 baseline 1  --Status post a stent  placement  --We will monitor renal functions closely  --Avoid nephrotoxins  --Urology consulted and following    Hypercapnic respiratory failure  --Likely multifactorial from sepsis and chronic narcotic use  --Was intubated perioperatively      Right femoral neck fracture  --Incidental finding on CT  --Patient has MS at baseline is able to pivot herself  --No pain noted on initial exam  --We will consult orthopedic surgery    Type 2 diabetes mellitus  --At baseline on metformin will hold for now  --Sliding scale insulin    MS with associated chronic pain  --Prior to admission on amitriptyline, baclofen, duloxetine, gabapentin and oxycodone  --PRN pain control  --PT evaluation    Significant leukocytosis  --Patient has history of T-cell non-Hodgkin's lymphoma status post treatment at baseline white cell count is in the mid teens  --Significant elevation likely due to sepsis    H/o Hypertension  --Was hypotensive on presentation  --Held losartan and hydrochlorothiazide    Bilateral inguinal wounds  --On admission concern for suppurative hydradenitis  --Wound culture sent  --1 dose of IV Vanco given for possible MRSA on admission          DVT Prophylaxis: Pneumatic Compression Devices  Code Status: Full Code    Disposition: To be decided intubated      Interval History   Assumed care reviewed chart.  Patient sedated and intubated unable to get review of systems    -Data reviewed today: I reviewed all new labs and imaging reports over the last 24 hours. I personally reviewed no images or EKG's today.    Physical Exam   Temp: 99.2  F (37.3  C) Temp src: Axillary BP: 101/48 Pulse: 113 Heart Rate: 121 Resp: 20 SpO2: 99 % O2 Device: Mechanical Ventilator Oxygen Delivery: 3 LPM  Vitals:    01/30/19 2103 01/31/19 0615   Weight: 127.6 kg (281 lb 4.9 oz) 126.2 kg (278 lb 3.5 oz)     Vital Signs with Ranges  Temp:  [97  F (36.1  C)-99.2  F (37.3  C)] 99.2  F (37.3  C)  Pulse:  [] 113  Heart Rate:  [] 121  Resp:   [9-21] 20  BP: ()/(40-81) 101/48  FiO2 (%):  [30 %-80 %] 60 %  SpO2:  [85 %-100 %] 99 %  I/O last 3 completed shifts:  In: 3029.78 [I.V.:3029.78]  Out: 2650 [Urine:2150; Emesis/NG output:500]      GEN: Intubated and sedated NAD.  HEENT:  Normocephalic/atraumatic, no scleral icterus, no nasal discharge, mouth dry  CV: Tachycardic no murmur or JVD.  S1 + S2 noted, no S3 or S4.  LUNGSleft basilar crackles.  Symmetric chest rise on inhalation noted.  ABD:  Active bowel sounds, soft, non-tender/non-distended.  No rebound/guarding/rigidity.  Bilateral inguinal wounds ?  hidradenitis  EXT: + edema.  No cyanosis.    SKIN:  Dry to touch, chronic stasis dermatitis and bilateral inguinal hidradenitis    Medications     lactated ringers 125 mL/hr at 01/31/19 0734     norepinephrine 0.09 mcg/kg/min (01/31/19 0734)     Patient RECEIVING antibiotic to treat a different condition and it provides ADEQUATE COVERAGE for this surgical procedure.       propofol (DIPRIVAN) infusion 40 mcg/kg/min (01/31/19 0734)       amitriptyline  100 mg Oral At Bedtime     aspirin  162 mg Oral Daily     atorvastatin  10 mg Oral Daily     baclofen  10 mg Oral TID     DULoxetine  60 mg Oral BID     gabapentin  600 mg Oral TID     insulin aspart  1-6 Units Subcutaneous Q4H     metoprolol succinate ER  50 mg Oral Daily     piperacillin-tazobactam  3.375 g Intravenous Q6H     vancomycin (VANCOCIN) IV  2,000 mg (central catheter) Intravenous Q24H     Data     Recent Labs   Lab 01/31/19  0555 01/30/19  2220 01/30/19  1840 01/30/19  1635   PH 7.37 7.28* 7.29*  --    PHV  --   --   --  7.29*   PO2 102 108* 75*  --    PO2V  --   --   --  57*   PCO2 44 55* 52*  --    PCO2V  --   --   --  62*   HCO3 26 26 25  --    HCO3V  --   --   --  30*     Recent Labs   Lab 01/30/19  1258   WBC 47.7*   HGB 12.9   HCT 40.3   MCV 81        Recent Labs   Lab 01/31/19  0520 01/30/19  1258   NA  --  130*   POTASSIUM  --  4.0   CHLORIDE  --  93*   CO2  --  25   ANIONGAP   --  12   GLC  --  107*   BUN  --  49*   CR 1.46* 2.26*   GFRESTIMATED 40* 24*   GFRESTBLACK 46* 27*   MARY  --  7.6*     Recent Labs   Lab 01/30/19  2120 01/30/19  2016 01/30/19  1522 01/30/19  1512   CULT PENDING  PENDING PENDING Cultured on the 1st day of incubation:  Gram negative bacilli  *  Critical Value/Significant Value, preliminary result only, called to and read back by  Beronica Woods  RN @0732 01/31/19 gd   Cultured on the 1st day of incubation:  Gram negative rods  *  Critical Value/Significant Value, preliminary result only, called to and read back by  SHAMAR PEREZ RN @0634 1/31/19. SCG       Recent Labs   Lab 01/30/19  1258   HGB 12.9     Recent Labs   Lab 01/30/19  1258   *   ALT 89*   ALKPHOS 163*   BILITOTAL 0.9     No results for input(s): INR in the last 168 hours.  Recent Labs   Lab 01/30/19  1258   LACT 1.2     Recent Labs   Lab 01/30/19  1448   COLOR Stacy   APPEARANCE Cloudy   URINEGLC Negative   URINEBILI Negative   URINEKETONE Negative   SG 1.018   UBLD Moderate*   URINEPH 5.0   PROTEIN 100*   NITRITE Negative   LEUKEST Large*   RBCU 49*   WBCU 104*       Recent Results (from the past 24 hour(s))   XR Chest 2 Views    Narrative    CHEST TWO VIEWS January 30, 2019 1:24 PM     HISTORY: Hypoxia.    COMPARISON: Chest x-ray 11/1/2017.      Impression    IMPRESSION: Two views of the chest are performed. Atelectasis is noted  at the lung bases. Lungs are otherwise clear. Heart is enlarged. This  may have increased slightly since prior exam. No obvious pneumothorax  or pleural effusions.    MIKAELA SALGADO MD   Head CT w/o contrast    Narrative    CT SCAN OF THE HEAD WITHOUT CONTRAST   1/30/2019 4:16 PM     HISTORY: Altered level of consciousness (LOC), unexplained    TECHNIQUE:  Axial images of the head and coronal reformations without  IV contrast material. Radiation dose for this scan was reduced using  automated exposure control, adjustment of the mA and/or kV according  to patient size,  or iterative reconstruction technique.    COMPARISON: None.    FINDINGS: Motion artifact degrades the quality of some of these  images. The ventricles are normal in size, shape and configuration.   The brain parenchyma and subarachnoid spaces are normal. There is no  evidence of intracranial hemorrhage, mass, acute infarct or anomaly.  The visualized portions of the sinuses and mastoids appear normal. No  evidence for trauma.      Impression    IMPRESSION: No acute pathology. No bleed, mass, or infarcts.      MURTAZA FREITAS MD   CT Abdomen Pelvis w/o Contrast    Narrative    CT ABDOMEN AND PELVIS WITHOUT CONTRAST  1/30/2019 4:17 PM     INDICATION: Abdominal pain, unspecified. Leukocytosis, lower abdominal  wall/groin infection.      TECHNIQUE: Thin axial images through the abdomen and pelvis without  contrast. Coronal reformatted images. Radiation dose for this scan was  reduced using automated exposure control, adjustment of the mA and/or  kV according to patient size, or iterative reconstruction technique.    COMPARISON: 9/21/2012.    FINDINGS: Mild left hydronephrosis due to two adjacent proximal  ureteral stones, the largest measuring 1 cm. Small nonobstructing  stones in the lower left kidney. Punctate nonobstructing stone lower  pole right kidney. Cholelithiasis. Mild nonspecific adrenal gland  thickening of doubtful significance. Upper abdominal organs are  otherwise negative without benefit of contrast. Artifact related to a  battery pack in the right anterior abdominal wall.    Minimal opacity in the right middle lobe and at the right base, likely  atelectasis. Slightly more prominent opacity at the left lung base may  be atelectasis or pneumonia.    Uterus is present. Mildly displaced, impacted right femoral neck  fracture, age-indeterminate. This is new since the prior study and  could be recent. Postoperative changes lumbar spine and sacrum.      Impression    IMPRESSION:  1. Mild left hydronephrosis due to  two adjacent proximal ureteral  stones measuring up to 1 cm.  2. Mildly displaced, impacted right femoral neck fracture, age  indeterminate, but could be recent. Correlate clinically.  3. Cholelithiasis. Bilateral renal stones.  4. Mild atelectasis or infiltrate left lung base.    BRENDEN PEREZ MD   Chest  XR, 1 view PORTABLE    Narrative    CHEST ONE VIEW PORTABLE  1/30/2019 7:40 PM     COMPARISON: Two-view chest x-ray same day    HISTORY: After tube placement      Impression    IMPRESSION: There has been interval placement of an endotracheal tube  with its tip at the level of the clavicular heads. There has been  interval placement of a right internal jugular central venous catheter  with its tip likely in the mid superior vena cava, although evaluation  is limited due to suboptimal image quality. Patchy airspace opacity in  the left lung base possibly representing atelectasis is again noted.  The right lung appears clear of airspace opacities, although there is  questionable interstitial prominence in the right lung that may  represent pulmonary edema. No pneumothorax. No definite pleural  effusion. Heart size remains moderately enlarged.    LUAN CARRILLO MD   XR Surgery LUCILA L/T 5 Min Fluoro w Stills    Narrative    This exam was marked as non-reportable because it will not be read by a   radiologist or a [place] non-radiologist provider.             XR Abdomen 1 View    Narrative    ABDOMEN ONE VIEW  1/30/2019 10:07 PM     COMPARISON: None    HISTORY: Orogastric tube placement.      Impression    IMPRESSION: Tip of the orogastric tube is in the gastric fundus. A  left ureteral stent is noted. Posterior spine fusion hardware from L4  to the pelvis is noted. A probable pain infusion pump is implanted  over the right lower quadrant. Abdominal bowel gas pattern is  nonspecific. There is no evidence for free intraperitoneal air.    IMPRESSION: No evidence for bowel obstruction or free air.    LUAN CARRILLO MD    XR Chest Port 1 View    Narrative    CHEST PORTABLE ONE VIEW   1/31/2019 5:46 AM     HISTORY: Post OR.    COMPARISON: 1/30/2019      Impression    IMPRESSION: There is increased left lower lobe consolidation, likely  atelectasis. Lung volumes are otherwise unchanged. Tubes and lines are  unchanged. Heart size is unchanged.

## 2019-01-31 NOTE — PLAN OF CARE
Right wrist and Left wrist restraints initiated on patient on 1/30/2019 at 11:08 PM    Clinical Justification: Pulling lines, pulling tubes, and pulling equipment  Less Restrictive Alternative: 1:1 patient care, Repositioning, Reorientation  Attending Physician Notified: No (comment),     Order received: Yes         Criteria explained to family left for the night patient not able to currently be educated   Patient's Response: No evidence of learning  Restraint care Plan initiated: Yes    Barber Carty

## 2019-02-01 ENCOUNTER — TELEPHONE (OUTPATIENT)
Dept: FAMILY MEDICINE | Facility: CLINIC | Age: 54
End: 2019-02-01

## 2019-02-01 ENCOUNTER — APPOINTMENT (OUTPATIENT)
Dept: GENERAL RADIOLOGY | Facility: CLINIC | Age: 54
DRG: 853 | End: 2019-02-01
Payer: COMMERCIAL

## 2019-02-01 ENCOUNTER — APPOINTMENT (OUTPATIENT)
Dept: CARDIOLOGY | Facility: CLINIC | Age: 54
DRG: 853 | End: 2019-02-01
Payer: COMMERCIAL

## 2019-02-01 LAB
ANION GAP SERPL CALCULATED.3IONS-SCNC: 5 MMOL/L (ref 3–14)
BACTERIA SPEC CULT: ABNORMAL
BASOPHILS # BLD AUTO: 0.1 10E9/L (ref 0–0.2)
BASOPHILS NFR BLD AUTO: 0.4 %
BUN SERPL-MCNC: 32 MG/DL (ref 7–30)
CALCIUM SERPL-MCNC: 7.1 MG/DL (ref 8.5–10.1)
CHLORIDE SERPL-SCNC: 104 MMOL/L (ref 94–109)
CO2 SERPL-SCNC: 29 MMOL/L (ref 20–32)
CREAT SERPL-MCNC: 0.77 MG/DL (ref 0.52–1.04)
DIFFERENTIAL METHOD BLD: ABNORMAL
EOSINOPHIL # BLD AUTO: 0.3 10E9/L (ref 0–0.7)
EOSINOPHIL NFR BLD AUTO: 1.2 %
ERYTHROCYTE [DISTWIDTH] IN BLOOD BY AUTOMATED COUNT: 17.5 % (ref 10–15)
GFR SERPL CREATININE-BSD FRML MDRD: 87 ML/MIN/{1.73_M2}
GLUCOSE BLDC GLUCOMTR-MCNC: 151 MG/DL (ref 70–99)
GLUCOSE BLDC GLUCOMTR-MCNC: 161 MG/DL (ref 70–99)
GLUCOSE SERPL-MCNC: 161 MG/DL (ref 70–99)
HCT VFR BLD AUTO: 31.2 % (ref 35–47)
HGB BLD-MCNC: 9.8 G/DL (ref 11.7–15.7)
IMM GRANULOCYTES # BLD: 0.5 10E9/L (ref 0–0.4)
IMM GRANULOCYTES NFR BLD: 2 %
INTERPRETATION ECG - MUSE: NORMAL
LYMPHOCYTES # BLD AUTO: 1.5 10E9/L (ref 0.8–5.3)
LYMPHOCYTES NFR BLD AUTO: 6.1 %
Lab: ABNORMAL
MAGNESIUM SERPL-MCNC: 2.1 MG/DL (ref 1.6–2.3)
MCH RBC QN AUTO: 25.5 PG (ref 26.5–33)
MCHC RBC AUTO-ENTMCNC: 31.4 G/DL (ref 31.5–36.5)
MCV RBC AUTO: 81 FL (ref 78–100)
MONOCYTES # BLD AUTO: 1.2 10E9/L (ref 0–1.3)
MONOCYTES NFR BLD AUTO: 4.8 %
NEUTROPHILS # BLD AUTO: 20.3 10E9/L (ref 1.6–8.3)
NEUTROPHILS NFR BLD AUTO: 85.5 %
NRBC # BLD AUTO: 0 10*3/UL
NRBC BLD AUTO-RTO: 0 /100
PHOSPHATE SERPL-MCNC: 2.7 MG/DL (ref 2.5–4.5)
PLATELET # BLD AUTO: 157 10E9/L (ref 150–450)
POTASSIUM SERPL-SCNC: 3.9 MMOL/L (ref 3.4–5.3)
RBC # BLD AUTO: 3.84 10E12/L (ref 3.8–5.2)
SODIUM SERPL-SCNC: 138 MMOL/L (ref 133–144)
SPECIMEN SOURCE: ABNORMAL
TROPONIN I SERPL-MCNC: 0.03 UG/L (ref 0–0.04)
TROPONIN I SERPL-MCNC: 0.04 UG/L (ref 0–0.04)
VANCOMYCIN SERPL-MCNC: 20.9 MG/L
WBC # BLD AUTO: 23.8 10E9/L (ref 4–11)

## 2019-02-01 PROCEDURE — 94003 VENT MGMT INPAT SUBQ DAY: CPT

## 2019-02-01 PROCEDURE — 25000128 H RX IP 250 OP 636: Performed by: INTERNAL MEDICINE

## 2019-02-01 PROCEDURE — 99291 CRITICAL CARE FIRST HOUR: CPT | Performed by: SURGERY

## 2019-02-01 PROCEDURE — 84100 ASSAY OF PHOSPHORUS: CPT | Performed by: INTERNAL MEDICINE

## 2019-02-01 PROCEDURE — 83735 ASSAY OF MAGNESIUM: CPT | Performed by: INTERNAL MEDICINE

## 2019-02-01 PROCEDURE — 99233 SBSQ HOSP IP/OBS HIGH 50: CPT | Performed by: INTERNAL MEDICINE

## 2019-02-01 PROCEDURE — 25000128 H RX IP 250 OP 636: Performed by: SURGERY

## 2019-02-01 PROCEDURE — 00000146 ZZHCL STATISTIC GLUCOSE BY METER IP

## 2019-02-01 PROCEDURE — 84484 ASSAY OF TROPONIN QUANT: CPT | Performed by: INTERNAL MEDICINE

## 2019-02-01 PROCEDURE — 85025 COMPLETE CBC W/AUTO DIFF WBC: CPT | Performed by: INTERNAL MEDICINE

## 2019-02-01 PROCEDURE — 80202 ASSAY OF VANCOMYCIN: CPT | Performed by: INTERNAL MEDICINE

## 2019-02-01 PROCEDURE — 40000986 XR ABDOMEN PORT 1 VW

## 2019-02-01 PROCEDURE — 25000132 ZZH RX MED GY IP 250 OP 250 PS 637: Performed by: SURGERY

## 2019-02-01 PROCEDURE — 40000275 ZZH STATISTIC RCP TIME EA 10 MIN

## 2019-02-01 PROCEDURE — 40000264 ECHOCARDIOGRAM COMPLETE

## 2019-02-01 PROCEDURE — 25500064 ZZH RX 255 OP 636: Performed by: INTERNAL MEDICINE

## 2019-02-01 PROCEDURE — 84484 ASSAY OF TROPONIN QUANT: CPT | Performed by: SURGERY

## 2019-02-01 PROCEDURE — 99222 1ST HOSP IP/OBS MODERATE 55: CPT | Performed by: INTERNAL MEDICINE

## 2019-02-01 PROCEDURE — 80048 BASIC METABOLIC PNL TOTAL CA: CPT | Performed by: INTERNAL MEDICINE

## 2019-02-01 PROCEDURE — 20000003 ZZH R&B ICU

## 2019-02-01 PROCEDURE — 25000125 ZZHC RX 250: Performed by: SURGERY

## 2019-02-01 PROCEDURE — 25000132 ZZH RX MED GY IP 250 OP 250 PS 637: Performed by: INTERNAL MEDICINE

## 2019-02-01 PROCEDURE — 93306 TTE W/DOPPLER COMPLETE: CPT | Mod: 26 | Performed by: INTERNAL MEDICINE

## 2019-02-01 RX ORDER — CEFAZOLIN SODIUM 1 G/50ML
2000 SOLUTION INTRAVENOUS EVERY 12 HOURS
Status: DISCONTINUED | OUTPATIENT
Start: 2019-02-02 | End: 2019-02-03

## 2019-02-01 RX ADMIN — INSULIN ASPART 1 UNITS: 100 INJECTION, SOLUTION INTRAVENOUS; SUBCUTANEOUS at 23:54

## 2019-02-01 RX ADMIN — TAZOBACTAM SODIUM AND PIPERACILLIN SODIUM 3.38 G: 375; 3 INJECTION, SOLUTION INTRAVENOUS at 09:16

## 2019-02-01 RX ADMIN — INSULIN ASPART 1 UNITS: 100 INJECTION, SOLUTION INTRAVENOUS; SUBCUTANEOUS at 04:10

## 2019-02-01 RX ADMIN — Medication 100 MCG/HR: at 16:16

## 2019-02-01 RX ADMIN — MULTIVITAMIN 15 ML: LIQUID ORAL at 08:00

## 2019-02-01 RX ADMIN — BACLOFEN 10 MG: 10 TABLET ORAL at 15:36

## 2019-02-01 RX ADMIN — AMIODARONE HYDROCHLORIDE 0.5 MG/MIN: 50 INJECTION, SOLUTION INTRAVENOUS at 20:58

## 2019-02-01 RX ADMIN — VANCOMYCIN HYDROCHLORIDE 2250 MG: 5 INJECTION, POWDER, LYOPHILIZED, FOR SOLUTION INTRAVENOUS at 11:47

## 2019-02-01 RX ADMIN — SODIUM CHLORIDE, POTASSIUM CHLORIDE, SODIUM LACTATE AND CALCIUM CHLORIDE: 600; 310; 30; 20 INJECTION, SOLUTION INTRAVENOUS at 20:58

## 2019-02-01 RX ADMIN — HEPARIN SODIUM 5000 UNITS: 5000 INJECTION, SOLUTION INTRAVENOUS; SUBCUTANEOUS at 07:43

## 2019-02-01 RX ADMIN — POTASSIUM CHLORIDE 20 MEQ: 1.5 POWDER, FOR SOLUTION ORAL at 06:29

## 2019-02-01 RX ADMIN — ATORVASTATIN CALCIUM 10 MG: 10 TABLET, FILM COATED ORAL at 08:00

## 2019-02-01 RX ADMIN — INSULIN ASPART 1 UNITS: 100 INJECTION, SOLUTION INTRAVENOUS; SUBCUTANEOUS at 08:11

## 2019-02-01 RX ADMIN — INSULIN ASPART 1 UNITS: 100 INJECTION, SOLUTION INTRAVENOUS; SUBCUTANEOUS at 20:01

## 2019-02-01 RX ADMIN — INSULIN ASPART 1 UNITS: 100 INJECTION, SOLUTION INTRAVENOUS; SUBCUTANEOUS at 00:08

## 2019-02-01 RX ADMIN — SODIUM CHLORIDE, POTASSIUM CHLORIDE, SODIUM LACTATE AND CALCIUM CHLORIDE: 600; 310; 30; 20 INJECTION, SOLUTION INTRAVENOUS at 04:10

## 2019-02-01 RX ADMIN — GABAPENTIN 600 MG: 600 TABLET, FILM COATED ORAL at 21:04

## 2019-02-01 RX ADMIN — DEXMEDETOMIDINE 0.6 MCG/KG/HR: 100 INJECTION, SOLUTION, CONCENTRATE INTRAVENOUS at 19:42

## 2019-02-01 RX ADMIN — AMIODARONE HYDROCHLORIDE 0.5 MG/MIN: 50 INJECTION, SOLUTION INTRAVENOUS at 05:31

## 2019-02-01 RX ADMIN — RANITIDINE HYDROCHLORIDE 150 MG: 15 SOLUTION ORAL at 08:03

## 2019-02-01 RX ADMIN — RANITIDINE HYDROCHLORIDE 150 MG: 15 SOLUTION ORAL at 21:04

## 2019-02-01 RX ADMIN — BACLOFEN 10 MG: 10 TABLET ORAL at 21:04

## 2019-02-01 RX ADMIN — INSULIN ASPART 1 UNITS: 100 INJECTION, SOLUTION INTRAVENOUS; SUBCUTANEOUS at 16:24

## 2019-02-01 RX ADMIN — BACLOFEN 10 MG: 10 TABLET ORAL at 08:00

## 2019-02-01 RX ADMIN — HUMAN ALBUMIN MICROSPHERES AND PERFLUTREN 3 ML: 10; .22 INJECTION, SOLUTION INTRAVENOUS at 09:15

## 2019-02-01 RX ADMIN — HEPARIN SODIUM 5000 UNITS: 5000 INJECTION, SOLUTION INTRAVENOUS; SUBCUTANEOUS at 15:36

## 2019-02-01 RX ADMIN — DEXMEDETOMIDINE 0.2 MCG/KG/HR: 100 INJECTION, SOLUTION, CONCENTRATE INTRAVENOUS at 12:17

## 2019-02-01 RX ADMIN — Medication 100 MCG/HR: at 01:16

## 2019-02-01 RX ADMIN — INSULIN ASPART 1 UNITS: 100 INJECTION, SOLUTION INTRAVENOUS; SUBCUTANEOUS at 12:13

## 2019-02-01 RX ADMIN — VASOPRESSIN 2.4 UNITS/HR: 20 INJECTION INTRAVENOUS at 09:27

## 2019-02-01 RX ADMIN — AMIODARONE HYDROCHLORIDE 0.5 MG/MIN: 50 INJECTION, SOLUTION INTRAVENOUS at 12:48

## 2019-02-01 RX ADMIN — TAZOBACTAM SODIUM AND PIPERACILLIN SODIUM 3.38 G: 375; 3 INJECTION, SOLUTION INTRAVENOUS at 21:01

## 2019-02-01 RX ADMIN — GABAPENTIN 600 MG: 600 TABLET, FILM COATED ORAL at 08:00

## 2019-02-01 RX ADMIN — GABAPENTIN 600 MG: 600 TABLET, FILM COATED ORAL at 15:36

## 2019-02-01 RX ADMIN — ASPIRIN 81 MG 162 MG: 81 TABLET ORAL at 08:00

## 2019-02-01 RX ADMIN — AMITRIPTYLINE HYDROCHLORIDE 100 MG: 50 TABLET, FILM COATED ORAL at 21:04

## 2019-02-01 RX ADMIN — TAZOBACTAM SODIUM AND PIPERACILLIN SODIUM 3.38 G: 375; 3 INJECTION, SOLUTION INTRAVENOUS at 03:13

## 2019-02-01 RX ADMIN — TAZOBACTAM SODIUM AND PIPERACILLIN SODIUM 3.38 G: 375; 3 INJECTION, SOLUTION INTRAVENOUS at 15:36

## 2019-02-01 RX ADMIN — SODIUM CHLORIDE, POTASSIUM CHLORIDE, SODIUM LACTATE AND CALCIUM CHLORIDE: 600; 310; 30; 20 INJECTION, SOLUTION INTRAVENOUS at 12:20

## 2019-02-01 ASSESSMENT — ACTIVITIES OF DAILY LIVING (ADL)
ADLS_ACUITY_SCORE: 30

## 2019-02-01 ASSESSMENT — MIFFLIN-ST. JEOR: SCORE: 1850

## 2019-02-01 NOTE — TELEPHONE ENCOUNTER
Prior Authorization Retail Medication Request    Medication/Dose: Oxycodone 15mg  ICD code (if different than what is on RX):    Previously Tried and Failed:    Rationale:  Quantity issue - max day supply they pay is 7 days    Insurance Name:    Insurance ID:  [id number]      Pharmacy Information (if different than what is on RX)  Name:  [facility]  Phone:  [phone]

## 2019-02-01 NOTE — PROGRESS NOTES
Cross coverage: Called to assess patient due to wide-complex tachycardia which appears to be SVT.  Despite vagal maneuvers with ice on the chest, patient remains in tachycardia with heart rates in the 170s and was mildly hypotensive.  Adenosine was given 6 mg with underlying rhythm that appears to be atrial flutter with RVR with 2-1 block.  After that, patient went into wide complex tachycardia again in which 12 additional milligrams of adenosine was given twice.  Subsequent to that, patient was shocked with 30 J with short-term slowing her heart rate and then was given 150 mg of amiodarone.  After that, patient was shocked again with 50 J with improvement of heart rate with an irregular rhythm although appears to be in atrial flutter with intermittent PACs, but remains more hemodynamically stable.  Will order an amiodarone drip and request formal cardiology consultation.  Telemetry ICU was also contacted and updated on patient's progress.  Continue Levophed for now but would recommend may be transitioning to angiotensin II drip if patient is hypotensive refractory    Critical time: Greater than 40 minutes

## 2019-02-01 NOTE — PROGRESS NOTES
I was called as patient was in what appears to be a sustained SVT, but will verify by reviewing ECG prior to treatment.     In the meantime I have ordered angiotensin II drip to replace the norepinephrine as it will treat the hypotension effectively without causing tachycardia. Hopefully this will prevent recurrence of the dysrhythmia.    Lake Abel MD  [facility] Intensivist Service

## 2019-02-01 NOTE — PHARMACY-VANCOMYCIN DOSING SERVICE
Pharmacy Vancomycin Note  Date of Service 2019  Patient's  [id number]   53 year old, female    Indication: Sepsis  Goal Trough Level: 15-20 mg/L  Day of Therapy: 3  Current Vancomycin regimen:  2250mg IV Q12H    Current estimated CrCl = Estimated Creatinine Clearance: 108.9 mL/min (based on SCr of 0.77 mg/dL).    Creatinine for last 3 days  2019: 12:58 PM Creatinine 2.26 mg/dL  2019:  5:20 AM Creatinine 1.46 mg/dL; 10:23 AM Creatinine 1.09 mg/dL  2019:  5:35 AM Creatinine 0.77 mg/dL    Recent Vancomycin Levels (past 3 days)  2019: 10:40 AM Vancomycin Level 20.9 mg/L    Vancomycin IV Administrations (past 72 hours)                   vancomycin (VANCOCIN) 2,250 mg in sodium chloride 0.9 % 250 mL intermittent infusion (mg) 2,250 mg New Bag 19 1147     2,250 mg New Bag 19 2312     2,250 mg New Bag  1312    vancomycin (VANCOCIN) 2,000 mg in sodium chloride 0.9 % 250 mL intermittent infusion (mg) 2,000 mg New Bag 19 2350                Nephrotoxins and other renal medications (From now, onward)    Start     Dose/Rate Route Frequency Ordered Stop    19 2300  vasopressin (VASOSTRICT) 40 Units in D5W 40 mL infusion      2.4 Units/hr  2.4 mL/hr  Intravenous CONTINUOUS 19 2253      19 1200  vancomycin (VANCOCIN) 2,250 mg in sodium chloride 0.9 % 250 mL intermittent infusion      2,250 mg (central catheter)  over 60 Minutes Intravenous EVERY 12 HOURS 19 1157      19 2130  piperacillin-tazobactam (ZOSYN) infusion 3.375 g     Comments:  Pharmacy can adjust dose based on renal function.    3.375 g  100 mL/hr over 30 Minutes Intravenous EVERY 6 HOURS 19 2043               Contrast Orders - past 72 hours (72h ago, onward)    Start     Dose/Rate Route Frequency Ordered Stop    19 0915  perflutren diluted 1mL to 2mL with saline (OPTISON) diluted injection 3 mL     Comments:  NDC# [id number]    3 mL Intravenous ONCE 19 0906 19 0915     01/30/19 2025  ioversol 68% (OPTIRAY 320) 50 mL + sterile water 50 mL injection  Status:  Discontinued        PRN 01/30/19 2025 01/30/19 2040          Interpretation of levels and current regimen:  Trough level of 20.9 mg/L is slightly Supratherapeutic    Has serum creatinine changed > 50% in last 72 hours: Yes - improved.    Urine output:  good urine output    Renal Function: Improving    Plan:  1.  Decrease Dose to 2000mg Q12H  2.  Pharmacy will check trough levels as appropriate in 1-3 Days.    3. Serum creatinine levels will be ordered daily for the first week of therapy and at least twice weekly for subsequent weeks.        Davis Lancaster, PharmD./PGY-1 Resident   [phone]

## 2019-02-01 NOTE — PLAN OF CARE
ICU End of Shift Summary.  For vital signs and complete assessments, please see documentation flowsheets.     Pertinent assessments: RASS goal -4. Afebrile. Tele SR/-70s. BP with MAPs greater than 65. Pt hypotensive with turns MAPs to 50s. Weight shifting only to prevent hypotension. Lungs coarse/diminished. Continues on vent support. Minimal secretions. Tolerating tube feed. Bobo in place with adequate dark tasha urine output.   Major Shift Events: Vasopressin added. Angiotensin started and currently off. Propofol titrated off. Versed added. Amiodarone started. K replaced.  Plan (Upcoming Events): wean from pressors and vent as able  Discharge/Transfer Needs: TCU?    Bedside Shift Report Completed : Y  Bedside Safety Check Completed: Y    Right wrist and Left wrist restraints continued 2/1/2019    Clinical Justification: Pulling lines, pulling tubes, and pulling equipment  Less Restrictive Alternative: Repositioning, Re-evaluate equipment, Reorientation  Attending Physician Notified: No (comment),     New orders placed No  Length of Order: 1 Day      Ele Hermosillo

## 2019-02-01 NOTE — CONSULTS
"CLINICAL NUTRITION SERVICES - BRIEF NOTE    - Refer to RD assessment completed yesterday.   - Unable to update nutrition history today given patient remains intubated/sedated.   - Initiation of EN yesterday at trophic rate.   - Will transition to nasoenteric tube today.  Charge RN will be placing.  - Medications reviewed:   Sliding scale insulin   MVI/M daily   Lactated ringers at 125 ml/hr   Versed   Off levo, low-rate vaso    Off Propofol, will remain off   - Labs reviewed, electrolytes WNL.  - No recorded BMs yet.   - Per WOCN assess yesterday, \"suspect fungal infection\".  No documentation of pressure.     - Ok to begin slow enteral advancement per discussion with team during rounds.  Updated goal regimen as a result, in combination with discontinuation of Propofol, outlined below:      Type of Feeding Tube: OG currently, transition pending    Enteral Frequency:  Continuous    Enteral Regimen: Peptamen Intense VHP at 65 mL/hr    Total Enteral Provisions: 1560 mL provides 1560 kcal (13 kcal/kg), 143 g protein (2.6 g/kg), 1310 ml free H2O, 119 g CHO and 6 g Fiber daily.     Meets >100% of DRI's --> Ok to continue daily MVI/M while advancing to goal    Free Water Flush: standard 60 mL q4 hours    Daily electrolyte check    Begin advancement by 15 ml q 10 hrs as tolerated.      ASSESSED NUTRITION NEEDS (PER APPROVED PRACTICE GUIDELINES, Dosing weight: 126 kg admit wt for energy, 54.5 kg IBW for protein):  Estimated Energy Needs: 0465-9098 kcals (11-14 Kcal/Kg)  Justification: obese a- nd vented  Estimated Protein Needs: >109 grams protein (>2 g pro/Kg)  Justification: hypercatabolism with critical illness  Estimated Fluid Needs: per MD  Justification: per MD    - Discussed POC with team during rounds, transition to nasoenteric tube with MD, RNs.  - Will continue following.      Saba Anderson RD, LD  Clinical Dietitian  3rd floor/ICU: [phone]  All other floors: [phone]  Weekend/holiday: [phone]  "

## 2019-02-01 NOTE — PLAN OF CARE
ICU End of Shift Summary.  For vital signs and complete assessments, please see documentation flowsheets.     Pertinent assessments: vented and sedated. Small cloudy secretions suctioned through ETT. LS: coarse. +BS. Bobo WDL in place good UOP. Right internal jugular intact. No changes to skin.  Major Shift Events: Fentanyl gtt started this am. Propofol titrated to achieve RASS score of -3. Ortho, WOC and Urology consulted. Please see note.  At 0944 patient HR was 168. EKG ordered showed ST with frequent PAC's, Dr Godoy notified. Fentanyl gtt started for pain and propofol increased as patient was awake and agitated. HR stabilized down to 110's around 1000 am.   At 1800 patient HR went up 160-170's. SVT. Stat EKG ordered and faxed to tele hub. Levophed gtt stopped per Tele hub MD order. Bp at this time soft but Map ok. Dr Milian arrived on the floor. ACLS meds were given, please see MAR and Dr Milian note. Patient was shocked twice. Once with 30 jules and another time with 50 shahzad. Upon second shock patient Heart rate converted to controlled rhythm Flutter with PAC (110's),.. Family were called and updated. Amiodarone gtt started. And angiotensin gtt ordered. Family arrived at 1900 and were updated at the bedside.   Plan (Upcoming Events): continue IVF, IV abx, monitor electrolytes, wean vent and pressors as needed. Cardiology consult  Discharge/Transfer Needs: continue ICU cares for now    Bedside Shift Report Completed : yes  Bedside Safety Check Completed: yes  Bilateral soft restraints continued 1/31/2019    Clinical Justification: Pulling lines, pulling tubes, and pulling equipment  Less Restrictive Alternative: Repositioning, Re-evaluate equipment, Disguise equipment, Pain management  Attending Physician Notified: yes  New orders placed : yes  Length of Order: 1 Day      Beronica Neal

## 2019-02-01 NOTE — PROGRESS NOTES
ICU staff  DOS 2/1/2019    Events of evening reviewed; discussed with tele hub MD last night as well. SVT developed with preserved BP, did not convert with adenosine x3; is back in SR after amiodarone bolus and d.c cardioversion. Remains sedated this morning.    Vitals:   99.1  70s-80s  19-20  90s-110s/50s-60s  92-99%    Vasopressin 2.4  Amiodarone 0.5  Fentanyl 100  Midazolam 2  Propofol off    I/O: 9204/4350    Exam:  Gen: Intubated, sedated, did not react to exam  HEENT: NC, AT; PERRL, no icterus  Pulm: Clear bilaterally  Cor: RRR, sinus on the monitor  Abdomen/GI: Soft, nondistended  : Bobo in place, urine clear  Extremities: Warm, nonedematous  Skin: Warm, well-perfused; intertrigonal erythema  Neuro: Sedated  Psych: Unable to assess    Data:   Labs reviewed  - WBCs down at 23,Hb 9.8  - Lytes OK, creatinine normalized at 0.77  - Troponin peaked at 0.057  Echocardiogram from this morning shows low-normal LV systolic function (LVEF 50-55%).  No new imaging  Microbiology reviewed  - Skin wound polymicrobial with E coli identified so far  - Blood cultures x2 with E coli by verigene, formal cx/sensitivities pending    Assessment/plan:  52 y/o woman with septic shock from ascending urinary tract infection secondary to obstructive ureteral stone; is s/p stent.  CNS: Sedated.  - Pain: Acute/chronic. Fentanyl for now. Home oxycodone held for now.  - MS: Continue Amitriptyline, baclofen, gabapentin. Resume duloxetine when able.  - Sedation: Switched from propofol to midazolam overnight because high-dose propofol demands were compromising blood pressure. Will add dex today, try to wean from midazolam -> dex by this evening to facilitate SBT attempts tomorrow.  Pulm: Tolerating mechanical ventilation.  - Acute respiratory failure: Continue ventilator support. Wean FiO2 today, if we can get to 40% can look at SBTs.  CV: BP looks good on 2.4 units of vasopressin.  - Septic shock: Didn't tolerate norepinephrine (SVT), did  OK with angiotensin II but exhausted hospital supply after one bag and subsequently switched to vasopressin. Can treat this as a titratable drug in this setting -- drop to 1.2 units if she is holding MAP >70 by later this afternoon to see if she tolerates.  - SVT vs atrial flutter: Resolved. Appreciate cardiology involvement. Plan to continue amiodarone gtt for tonight and transition to enteral tomorrow.  FEN/GI: Abdomen benign.  - Protein-calorie malnutrition: Continue tube feedings.   - Morbid obesity affecting cares: BMI 48.  : UOP good, urine clear.  - JULIANNE: Resolved.  - Ureteral stone: s/p stenting; urology following. Will need stone extraction eventually.  - K/Mg protocols.  Heme: Hb 9.8 (11.9).  - No indication to transfuse for mild anemia.  Msk: Extremities warm, well-perfused.   - Intertrigonal rash: Is getting nystatin. Cultures from swab polymicrobial, which is not unexpected. Follow.  Endo: Glucose 145-161.  - DM2: Sliding scale insulin.  ID: Afebrile, WBCs continue to trend down.  - Urinary tract infection  - E coli bacteremia  -> Sensitivities pending, continue pip/tazo and vancomycin until culture results finalize.  ICU: [facility], H2.    This patient is critically ill to my assessment and requires ICU monitoring and cares. A total of 30 minutes critical care time spent on 2/1/2019.     Shola Godoy MD, PhD  Surgical critical care  February 1, 2019, 11:18 AM

## 2019-02-01 NOTE — CONSULTS
[facility]    CARDIOLOGY CONSULT    Date of Admission:  1/30/2019  Date of Consult: February 1, 2019    ASSESSMENT:  53-year-old female seen for atrial flutter in the context of urosepsis.  She  presented in sinus rhythm, but had probably 2 bouts of atrial flutter with rates up to 170.  She responded to cardioversion yesterday.  She has now been in sinus rhythm for the past 12 hours on an amiodarone drip.  Hemodynamics improved with restoration of sinus rhythm.    This is probably stress-induced arrhythmia.  Echocardiogram will be ordered.  Will plan to keep her on the amiodarone drip.  If she goes back into atrial flutter, would repeat cardioversion due to hemodynamic instability.  Would not recommend anticoagulation at this point as she was only in atrial flutter for 8 hours or so.    RECOMMENDATIONS:  1.  Atrial flutter, provoked by urosepsis  -Continue amiodarone drip until tomorrow, will then decide on starting oral amiodarone pending her clinical status  -No need for anticoagulation from a cardiac perspective at this point  - Echocardiogram  -With recurrence of rapid atrial flutter, contact cardiology for probable repeat cardioversion    Michi Valenzuela MD  Cardiology - [facility]  Pager:  [phone]  Text Page  February 1, 2019    CODE STATUS:  Full Code    REASON FOR CONSULT: Atrial flutter    PRIMARY CARE PHYSICIAN:  Julius Hassan    HISTORY OF PRESENT ILLNESS:  53 year old female with no cardiac history is seen for atrial flutter.    She has a history of advanced multiple sclerosis, diabetes, hypertension, previous T-cell lymphoma with chronically elevated leukocytosis, and recurrent UTIs.    At baseline patient is wheelchair-bound due to spinal issues.  She takes narcotics intermittently for chronic pain and has a pain contract with her primary physician.    She presented to the emergency department on January 30 with worsening leg pain and slightly altered mental status.  She had been  taking oxycodone for chronic pain.  Workup revealed a right femoral fracture of unknown duration.  She was also found to have left obstructing nephrolithiasis with hydronephrosis.  She became hypotensive consistent with sepsis.  She underwent cystoscopy with left ureteral stent placement.  Her urine was very cloudy and purulent.    The afternoon of January 31 she became tachycardic, heart rates up to 170s.  This appeared to be an SVT.  Adenosine was given which showed probable underlying atrial flutter.  She was cardioverted and then returned to sinus rhythm.  Amiodarone drip was started.    Overnight she remained in sinus rhythm with heart rate in the 80s.  Blood pressure has been around 100 systolic.  She remains intubated on vasopressin.  Urine output has been very good.    PAST MEDICAL HISTORY:  I have reviewed this patient's medical history and updated it with pertinent information if needed.   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  Past Surgical History:   Procedure Laterality Date     COLONOSCOPY N/A 1/7/2015    tubular adenomas x 2 - due 5 yrs     COMBINED CYSTOSCOPY, RETROGRADES, URETEROSCOPY, INSERT STENT Left 1/30/2019    Procedure: 1. Cystoscopy 2. LEFT retrograde pyelogram 3. LEFT JJ stent placement 4. <1hr physician fluoroscopy time;  Surgeon: Epifanio Sapp MD;  Location: [facility] OR     FUSION LUMBAR ANTERIOR, FUSION LUMBAR POSTERIOR TWO LEVELS, COMBINED  10/17/2013    lumbar fusion - Dr Floyd     INSERT PUMP BACLOFEN  04/2017    intrathecal baclofen pump implantation     IRRIGATION AND DEBRIDEMENT LOWER EXTREMITY, COMBINED Right 2015    Right ankle I&D d/t infection     OPEN REDUCTION INTERNAL FIXATION ANKLE  5/15    Right Bimalleolar ankle fx ORIF     OPEN REDUCTION INTERNAL FIXATION ANKLE Right 11/2015    Revision due to spasms pulling screws out of ankle     SINUS SURGERY  2011    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     XR LUMBAR EPIDURAL INJECTION INCL IMAGING  3/14    Left L4-5 Epidural Dr Winter       HOME MEDICATIONS:  Prior to Admission Medications   Prescriptions Last Dose Informant Patient Reported? Taking?   ASPIRIN PO Past Week at Unknown time  Yes Yes   Sig: Take 162 mg by mouth daily   DULoxetine (CYMBALTA) 60 MG capsule   Yes Yes   Sig: Take 60 mg by mouth 2 times daily   Multiple Vitamin (MULTI-VITAMIN PO) Past Week at Unknown time  Yes Yes   Sig: Take 1 tablet by mouth daily    amitriptyline (ELAVIL) 100 MG tablet Past Week at Unknown time  No Yes   Sig: Take 1 tablet (100 mg) by mouth At Bedtime   atorvastatin (LIPITOR) 10 MG tablet Past Week at Unknown time  No Yes   Sig: Take 1 tablet (10 mg) by  mouth daily   baclofen (LIORESAL) 10 MG tablet Past Week at Unknown time  No Yes   Sig: Take 1 tablet (10 mg) by mouth 3 times daily May take an additional 10 mg as needed qd for total of 40 mg per day   cholecalciferol 1000 units TABS Past Week at Unknown time  Yes Yes   Sig: Take 1,000 Units by mouth daily   gabapentin (NEURONTIN) 600 MG tablet Past Week at Unknown time  No Yes   Sig: TAKE ONE TABLET BY MOUTH THREE TIMES DAILY AND MAY TAKE ONE ADDITIONAL AS NEEDED   losartan-hydrochlorothiazide (HYZAAR) 100-25 MG tablet Past Week at Unknown time  No Yes   Sig: TAKE ONE TABLET BY MOUTH ONCE DAILY   metFORMIN (GLUCOPHAGE) 500 MG tablet Past Week at Unknown time  No Yes   Sig: Take 1 tablet (500 mg) by mouth daily (with dinner)   metoprolol succinate (TOPROL-XL) 50 MG 24 hr tablet Past Week at Unknown time  No Yes   Sig: TAKE 1 TABLET BY MOUTH ONCE DAILY   omega-3 fatty acids (FISH OIL) 1200 MG capsule Past Week at Unknown time  Yes Yes   Sig: Take 1 capsule by mouth daily.   oxyCODONE IR (ROXICODONE) 15 MG tablet Past Week at Unknown time  No Yes   Sig: Take 0.5-1 tablets (7.5-15 mg) by mouth every 4 hours as needed for moderate to severe pain Limit 4 tablet(s) per day.      Facility-Administered Medications: None       ALLERGIES:  Allergies   Allergen Reactions     Lisinopril      Lip swelling     Cyclobenzaprine Other (See Comments)     Per 4-10-17 H&P by Yanna Dugan PA-C.     Flexeril [Cyclobenzaprine Hcl]      Got confused        SOCIAL HISTORY:  I have reviewed this patient's social history and updated it with pertinent information if needed. Amanda Richardson  reports that she has been smoking cigarettes.  She has been smoking about 0.50 packs per day. she has never used smokeless tobacco. She reports that she does not drink alcohol or use drugs.    FAMILY HISTORY:  I have reviewed this patient's family history and updated it with pertinent information if needed.   Family History   Problem Relation Age of  Onset     C.A.D. Father         with CHF      Cancer Father         ? unsure type - abdominal      Hypertension Mother      Thyroid Disease Mother         goiter      Breast Cancer Sister 58     Thyroid Disease Son      Cancer - colorectal No family hx of        REVIEW OF SYSTEMS:  Patient is intubated and sedated.  Unable to obtain review of systems.    PHYSICAL EXAM:  Temp: 99  F (37.2  C) Temp src: Axillary BP: 103/54 Pulse: 78 Heart Rate: 76 Resp: 19 SpO2: 96 % O2 Device: Mechanical Ventilator    Vital Signs with Ranges  Temp:  [98.7  F (37.1  C)-99.2  F (37.3  C)] 99  F (37.2  C)  Pulse:  [] 78  Heart Rate:  [] 76  Resp:  [7-26] 19  BP: ()/(20-74) 103/54  FiO2 (%):  [40 %-55 %] 55 %  SpO2:  [90 %-100 %] 96 %  279 lbs 15.75 oz    Constitutional: Intubated and sedated  Eyes: PERRL, sclera nonicteric  ENT: trachea midline  Respiratory: Vent sounds  Cardiovascular: Regular rate and rhythm, mild lower extremity edema  GI: nondistended, nontender, bowel sounds present  Lymph/Hematologic: no lymphadenopathy  Skin: dry, no rash  Musculoskeletal: good muscle tone, strength 5/5 in upper and lower extremities  Neurologic: Unable to assess  Neuropsychiatric: Unable to assess    Intake/Output Summary (Last 24 hours) at 2/1/2019 0706  Last data filed at 2/1/2019 0635  Gross per 24 hour   Intake 9204.09 ml   Output 4350 ml   Net 4854.09 ml     DATA:  Labs: Sodium 136, potassium 3.9, creatinine 0.8, troponin 0 0.06, 0.03, WBC 24, heme globin 9.8, platelets 157    Tele: Baseline rhythm sinus, on January 30 1 in the afternoon she had what is probably atrial flutter, return to sinus rhythm after cardioversion

## 2019-02-01 NOTE — PROGRESS NOTES
Respiratory Therapy Note    Patient seen resting in bed on mechanical ventilator settings:    Ventilation Mode: CMV/AC  (Continuous Mandatory Ventilation/ Assist Control)  FiO2 (%): 50 %  Rate Set (breaths/minute): 20 breaths/min  Tidal Volume Set (mL): 550 mL  PEEP (cm H2O): 8 cmH2O  Oxygen Concentration (%): 50 %  Resp: 20    Respiratory rate 20-22, breath sounds coarse/diminished, and SpO2 93%. Suctioning small, white/brown, thick secretions from ETT. RT will continue to monitor.    Linette Heck  5:30 PM February 1, 2019

## 2019-02-02 ENCOUNTER — APPOINTMENT (OUTPATIENT)
Dept: GENERAL RADIOLOGY | Facility: CLINIC | Age: 54
DRG: 853 | End: 2019-02-02
Payer: COMMERCIAL

## 2019-02-02 LAB
BACTERIA SPEC CULT: ABNORMAL
BACTERIA SPEC CULT: NORMAL
BASOPHILS # BLD AUTO: 0.1 10E9/L (ref 0–0.2)
BASOPHILS NFR BLD AUTO: 0.3 %
CK SERPL-CCNC: 1027 U/L (ref 30–225)
CK SERPL-CCNC: 917 U/L (ref 30–225)
CREAT SERPL-MCNC: 0.82 MG/DL (ref 0.52–1.04)
DIFFERENTIAL METHOD BLD: ABNORMAL
EOSINOPHIL # BLD AUTO: 0.2 10E9/L (ref 0–0.7)
EOSINOPHIL NFR BLD AUTO: 1 %
ERYTHROCYTE [DISTWIDTH] IN BLOOD BY AUTOMATED COUNT: 17.7 % (ref 10–15)
GFR SERPL CREATININE-BSD FRML MDRD: 81 ML/MIN/{1.73_M2}
GLUCOSE BLDC GLUCOMTR-MCNC: 122 MG/DL (ref 70–99)
GLUCOSE BLDC GLUCOMTR-MCNC: 126 MG/DL (ref 70–99)
GLUCOSE BLDC GLUCOMTR-MCNC: 142 MG/DL (ref 70–99)
GLUCOSE BLDC GLUCOMTR-MCNC: 146 MG/DL (ref 70–99)
GLUCOSE BLDC GLUCOMTR-MCNC: 154 MG/DL (ref 70–99)
GLUCOSE BLDC GLUCOMTR-MCNC: 157 MG/DL (ref 70–99)
HCT VFR BLD AUTO: 31.1 % (ref 35–47)
HGB BLD-MCNC: 9.8 G/DL (ref 11.7–15.7)
IMM GRANULOCYTES # BLD: 0.6 10E9/L (ref 0–0.4)
IMM GRANULOCYTES NFR BLD: 2.8 %
LYMPHOCYTES # BLD AUTO: 2.2 10E9/L (ref 0.8–5.3)
LYMPHOCYTES NFR BLD AUTO: 9.7 %
Lab: ABNORMAL
MCH RBC QN AUTO: 25.9 PG (ref 26.5–33)
MCHC RBC AUTO-ENTMCNC: 31.5 G/DL (ref 31.5–36.5)
MCV RBC AUTO: 82 FL (ref 78–100)
MONOCYTES # BLD AUTO: 1.2 10E9/L (ref 0–1.3)
MONOCYTES NFR BLD AUTO: 5.1 %
NEUTROPHILS # BLD AUTO: 18.8 10E9/L (ref 1.6–8.3)
NEUTROPHILS NFR BLD AUTO: 81.1 %
NRBC # BLD AUTO: 0 10*3/UL
NRBC BLD AUTO-RTO: 0 /100
PHOSPHATE SERPL-MCNC: 2.9 MG/DL (ref 2.5–4.5)
PLATELET # BLD AUTO: 171 10E9/L (ref 150–450)
POTASSIUM SERPL-SCNC: 4.1 MMOL/L (ref 3.4–5.3)
RBC # BLD AUTO: 3.79 10E12/L (ref 3.8–5.2)
SPECIMEN SOURCE: ABNORMAL
SPECIMEN SOURCE: NORMAL
TRIGL SERPL-MCNC: 292 MG/DL
WBC # BLD AUTO: 23.2 10E9/L (ref 4–11)

## 2019-02-02 PROCEDURE — 25000128 H RX IP 250 OP 636: Performed by: SURGERY

## 2019-02-02 PROCEDURE — 25000128 H RX IP 250 OP 636: Performed by: INTERNAL MEDICINE

## 2019-02-02 PROCEDURE — 82550 ASSAY OF CK (CPK): CPT | Performed by: ANESTHESIOLOGY

## 2019-02-02 PROCEDURE — 00000146 ZZHCL STATISTIC GLUCOSE BY METER IP

## 2019-02-02 PROCEDURE — 99291 CRITICAL CARE FIRST HOUR: CPT | Performed by: SURGERY

## 2019-02-02 PROCEDURE — 25000132 ZZH RX MED GY IP 250 OP 250 PS 637: Performed by: INTERNAL MEDICINE

## 2019-02-02 PROCEDURE — 84132 ASSAY OF SERUM POTASSIUM: CPT | Performed by: INTERNAL MEDICINE

## 2019-02-02 PROCEDURE — 40000275 ZZH STATISTIC RCP TIME EA 10 MIN

## 2019-02-02 PROCEDURE — 27210437 ZZH NUTRITION PRODUCT SEMIELEM INTERMED LITER

## 2019-02-02 PROCEDURE — 82565 ASSAY OF CREATININE: CPT | Performed by: INTERNAL MEDICINE

## 2019-02-02 PROCEDURE — 94003 VENT MGMT INPAT SUBQ DAY: CPT

## 2019-02-02 PROCEDURE — 20000003 ZZH R&B ICU

## 2019-02-02 PROCEDURE — 99233 SBSQ HOSP IP/OBS HIGH 50: CPT | Performed by: INTERNAL MEDICINE

## 2019-02-02 PROCEDURE — 82550 ASSAY OF CK (CPK): CPT | Performed by: INTERNAL MEDICINE

## 2019-02-02 PROCEDURE — 71045 X-RAY EXAM CHEST 1 VIEW: CPT

## 2019-02-02 PROCEDURE — 25000125 ZZHC RX 250: Performed by: SURGERY

## 2019-02-02 PROCEDURE — 85025 COMPLETE CBC W/AUTO DIFF WBC: CPT | Performed by: INTERNAL MEDICINE

## 2019-02-02 PROCEDURE — 84100 ASSAY OF PHOSPHORUS: CPT | Performed by: INTERNAL MEDICINE

## 2019-02-02 PROCEDURE — 84478 ASSAY OF TRIGLYCERIDES: CPT | Performed by: INTERNAL MEDICINE

## 2019-02-02 RX ORDER — DOCUSATE SODIUM 100 MG/1
100 CAPSULE, LIQUID FILLED ORAL 2 TIMES DAILY PRN
Status: DISCONTINUED | OUTPATIENT
Start: 2019-02-02 | End: 2019-02-02

## 2019-02-02 RX ORDER — FUROSEMIDE 10 MG/ML
40 INJECTION INTRAMUSCULAR; INTRAVENOUS ONCE
Status: COMPLETED | OUTPATIENT
Start: 2019-02-02 | End: 2019-02-02

## 2019-02-02 RX ORDER — FUROSEMIDE 10 MG/ML
20 INJECTION INTRAMUSCULAR; INTRAVENOUS ONCE
Status: COMPLETED | OUTPATIENT
Start: 2019-02-02 | End: 2019-02-02

## 2019-02-02 RX ORDER — LIDOCAINE 40 MG/G
CREAM TOPICAL
Status: DISCONTINUED | OUTPATIENT
Start: 2019-02-02 | End: 2019-02-07 | Stop reason: CLARIF

## 2019-02-02 RX ADMIN — INSULIN ASPART 1 UNITS: 100 INJECTION, SOLUTION INTRAVENOUS; SUBCUTANEOUS at 03:59

## 2019-02-02 RX ADMIN — HEPARIN SODIUM 5000 UNITS: 5000 INJECTION, SOLUTION INTRAVENOUS; SUBCUTANEOUS at 16:20

## 2019-02-02 RX ADMIN — BACLOFEN 10 MG: 10 TABLET ORAL at 16:21

## 2019-02-02 RX ADMIN — AMIODARONE HYDROCHLORIDE 0.5 MG/MIN: 50 INJECTION, SOLUTION INTRAVENOUS at 20:54

## 2019-02-02 RX ADMIN — ATORVASTATIN CALCIUM 10 MG: 10 TABLET, FILM COATED ORAL at 09:01

## 2019-02-02 RX ADMIN — TAZOBACTAM SODIUM AND PIPERACILLIN SODIUM 3.38 G: 375; 3 INJECTION, SOLUTION INTRAVENOUS at 21:57

## 2019-02-02 RX ADMIN — TAZOBACTAM SODIUM AND PIPERACILLIN SODIUM 3.38 G: 375; 3 INJECTION, SOLUTION INTRAVENOUS at 09:02

## 2019-02-02 RX ADMIN — FUROSEMIDE 40 MG: 10 INJECTION, SOLUTION INTRAMUSCULAR; INTRAVENOUS at 09:11

## 2019-02-02 RX ADMIN — AMITRIPTYLINE HYDROCHLORIDE 100 MG: 50 TABLET, FILM COATED ORAL at 21:58

## 2019-02-02 RX ADMIN — GABAPENTIN 600 MG: 600 TABLET, FILM COATED ORAL at 16:21

## 2019-02-02 RX ADMIN — INSULIN ASPART 1 UNITS: 100 INJECTION, SOLUTION INTRAVENOUS; SUBCUTANEOUS at 08:23

## 2019-02-02 RX ADMIN — VANCOMYCIN HYDROCHLORIDE 2000 MG: 10 INJECTION, POWDER, LYOPHILIZED, FOR SOLUTION INTRAVENOUS at 00:08

## 2019-02-02 RX ADMIN — DEXMEDETOMIDINE 0.6 MCG/KG/HR: 100 INJECTION, SOLUTION, CONCENTRATE INTRAVENOUS at 01:17

## 2019-02-02 RX ADMIN — MULTIVITAMIN 15 ML: LIQUID ORAL at 09:02

## 2019-02-02 RX ADMIN — TAZOBACTAM SODIUM AND PIPERACILLIN SODIUM 3.38 G: 375; 3 INJECTION, SOLUTION INTRAVENOUS at 16:24

## 2019-02-02 RX ADMIN — DEXMEDETOMIDINE 0.4 MCG/KG/HR: 100 INJECTION, SOLUTION, CONCENTRATE INTRAVENOUS at 18:29

## 2019-02-02 RX ADMIN — VASOPRESSIN 2.4 UNITS/HR: 20 INJECTION INTRAVENOUS at 00:10

## 2019-02-02 RX ADMIN — RANITIDINE HYDROCHLORIDE 150 MG: 15 SOLUTION ORAL at 09:08

## 2019-02-02 RX ADMIN — TAZOBACTAM SODIUM AND PIPERACILLIN SODIUM 3.38 G: 375; 3 INJECTION, SOLUTION INTRAVENOUS at 02:54

## 2019-02-02 RX ADMIN — DEXMEDETOMIDINE 0.4 MCG/KG/HR: 100 INJECTION, SOLUTION, CONCENTRATE INTRAVENOUS at 09:07

## 2019-02-02 RX ADMIN — ASPIRIN 81 MG 162 MG: 81 TABLET ORAL at 09:00

## 2019-02-02 RX ADMIN — INSULIN ASPART 1 UNITS: 100 INJECTION, SOLUTION INTRAVENOUS; SUBCUTANEOUS at 12:17

## 2019-02-02 RX ADMIN — GABAPENTIN 600 MG: 600 TABLET, FILM COATED ORAL at 09:01

## 2019-02-02 RX ADMIN — AMIODARONE HYDROCHLORIDE 0.5 MG/MIN: 50 INJECTION, SOLUTION INTRAVENOUS at 04:52

## 2019-02-02 RX ADMIN — VANCOMYCIN HYDROCHLORIDE 2000 MG: 10 INJECTION, POWDER, LYOPHILIZED, FOR SOLUTION INTRAVENOUS at 11:43

## 2019-02-02 RX ADMIN — HEPARIN SODIUM 5000 UNITS: 5000 INJECTION, SOLUTION INTRAVENOUS; SUBCUTANEOUS at 08:01

## 2019-02-02 RX ADMIN — INSULIN ASPART 1 UNITS: 100 INJECTION, SOLUTION INTRAVENOUS; SUBCUTANEOUS at 23:50

## 2019-02-02 RX ADMIN — AMIODARONE HYDROCHLORIDE 0.5 MG/MIN: 50 INJECTION, SOLUTION INTRAVENOUS at 13:19

## 2019-02-02 RX ADMIN — RANITIDINE HYDROCHLORIDE 150 MG: 15 SOLUTION ORAL at 21:58

## 2019-02-02 RX ADMIN — BACLOFEN 10 MG: 10 TABLET ORAL at 09:01

## 2019-02-02 RX ADMIN — SODIUM CHLORIDE, POTASSIUM CHLORIDE, SODIUM LACTATE AND CALCIUM CHLORIDE: 600; 310; 30; 20 INJECTION, SOLUTION INTRAVENOUS at 04:52

## 2019-02-02 RX ADMIN — Medication 100 MCG/HR: at 07:01

## 2019-02-02 RX ADMIN — BACLOFEN 10 MG: 10 TABLET ORAL at 21:58

## 2019-02-02 RX ADMIN — Medication 100 MCG/HR: at 21:56

## 2019-02-02 RX ADMIN — DOCUSATE SODIUM 100 MG: 50 LIQUID ORAL at 16:22

## 2019-02-02 RX ADMIN — FUROSEMIDE 20 MG: 10 INJECTION, SOLUTION INTRAMUSCULAR; INTRAVENOUS at 16:19

## 2019-02-02 RX ADMIN — HEPARIN SODIUM 5000 UNITS: 5000 INJECTION, SOLUTION INTRAVENOUS; SUBCUTANEOUS at 00:12

## 2019-02-02 RX ADMIN — GABAPENTIN 600 MG: 600 TABLET, FILM COATED ORAL at 21:58

## 2019-02-02 ASSESSMENT — ACTIVITIES OF DAILY LIVING (ADL)
ADLS_ACUITY_SCORE: 30
ADLS_ACUITY_SCORE: 32
ADLS_ACUITY_SCORE: 30
ADLS_ACUITY_SCORE: 32
ADLS_ACUITY_SCORE: 32
ADLS_ACUITY_SCORE: 30

## 2019-02-02 ASSESSMENT — MIFFLIN-ST. JEOR: SCORE: 1885

## 2019-02-02 NOTE — PLAN OF CARE
ICU End of Shift Summary.  For vital signs and complete assessments, please see documentation flowsheets.     Pertinent assessments: Patient sedated on ventilator. Eyes open to repeated stimuli. Large amounts of respiratory secretions. Good urine output. Tolerating tube feeding.   Major Shift Events: Increase in PEEP and fio2 on ventilator. Able to wean off pressors. Diuretics given.  Plan (Upcoming Events): continue to monitor  Discharge/Transfer Needs: to be determined    Bedside Shift Report Completed : yes  Bedside Safety Check Completed:yes

## 2019-02-02 NOTE — PLAN OF CARE
ICU End of Shift Summary.  For vital signs and complete assessments, please see documentation flowsheets.     Pertinent assessments: Vented and sedated. FIO2  55%, , Rate 20 and Peep of 8. Small clear secretions suctioned through ETT and mouth. LS: coarse, dim. +BS.  Tele: SR with BBB, Peaked T waves ( MD aware). Tolerated TF at 25 cc/hr. 60 CC of water flushes Q 4. No changes to skin, wound care done. Bobo WDL in place, good UOP( borderline for the last few hours), . 2 + BLE edema. [place] in color. Right IJ intact. LR infusing at 125/hr. Fentanyl infusing at 100mcg/hr. COPT score( 0-3). Precedex gtt infusing at 0.6 mcg/kg/hr . Rass score -2. (Goal -2 to -3). Vasopressin infusing at 2.4 units/hr to keep Map above 65. Amiodarone gtt continues at 0.5mg/min( HR 60-80's).   Major Shift Events: Precedex gtt added. Versed titrated off per DR Godoy . Keofeed  TF placed. TF advanced to 25 cc/hr  ( Goal is 65).Echo performed, cardiologist consulted.  At 1225 , Vasopressin gtt titrated to 1.2 units /hr per Dr Godoy. AT 1420 BP dropped to 84/52 MAP 66. Vasopressin gtt titrated back to 2.4 units /hr and Dr Montemayor Notified. No new orders received at this time. BP improved. MAP > 65  Keofeed placed at the bedside, TF switch to keofeed at 25 cc/hr.   Plan (Upcoming Events): continue IVF, IV abx, monitor labs and Cultures, wean vasopressors as needed, Continue pulmonary toileting, wean off FIO2. Advance TF as tolerated ( next time to advance at 2300 to 40 CC/hr). possible wean Peep in am and breathing trial.  Discharge/Transfer Needs: continue ICU cares for now    Bedside Shift Report Completed : yes  Bedside Safety Check Completed: yes    Bilateral soft restraints continued 2/1/2019     Clinical Justification: Pulling lines, pulling tubes, and pulling equipment  Less Restrictive Alternative: Repositioning, Re-evaluate equipment, Disguise equipment, Pain management  Attending Physician Notified: yes  New orders placed  : yes  Length of Order: 1 Day        Beronica Neal

## 2019-02-02 NOTE — PROGRESS NOTES
[facility]    Hospitalist Progress Note  Name: Amanda Richardson    MRN: [id number]  Provider: Ирина Montemayor MD  Date of Service: 02/02/2019    Assessment & Plan   Summary of Stay: Amanda Richardson is a 53 year old female with past medical history significant for advanced MS, diabetes mellitus, hypertension, history of T-cell lymphoma status post treatment, chronically elevated WBC count, history of recurrent UTIs who presented with altered mental status increased weakness and somnolence.  Evaluation in the emergency room showed significant leukocytosis, hypotension, acute renal failure, obstructing left kidney stone with moderate hydronephrosis and respiratory failure with acidosis with hypercapnia.  She was admitted was admitted on 1/30/2019 with septic shock.  Patient received IV antibiotics and fluid resuscitation in the emergency room and underwent emergent cystoscopy and left retrograde pyelogram with stent placement on 1/30/2019.  CT also showed closed fracture of right femur.  She was initially placed on BiPAP for respiratory failure but perioperatively was intubated. she was admitted to ICU for further evaluation.    1/31/2019 patient developed wide-complex tachycardia appeared to be SVT failed to respond to vasovagal maneuvers.  Adenosine was given which showed rhythm to be atrial flutter she received cardioversion and was started on amiodarone.  Angiotensin II drip was initiated  briefly for hypotension.  Norepinephrine drip was discontinued.  Patient now on vasopressin          New Atrial flutter provoked by urosepsis  --Status post cardioversion 1/31/2019  --Started on amiodarone drip  --Appreciate cardiology consult  --Cardiac echo pending  --No anticoagulation for now per cardiology patient was in atrial flutter for less than 8 hours    Septic shock likely due to urinary tract source (altered mental status, acute renal failure, hypotension, leukocytosis, acidosis, respiratory  failure)  --CT scan on admission showed obstructing stone with hydronephrosis and acute renal failure  --Likely secondary to infected kidney stone  --Status post cystoscopy and stent placement 1/30/2019.  Per report patient did have purulent drainage noted during cystoscopy.  --Fluid resuscitation in the emergency room  --IV antibiotic started zosyn to cover for possible E. coli infection.  She also received a azithromycin for possible aspiration pneumonia as noted on CT  --Cultures positive for E coli  --Requiring vasopressin      Hypercapnic respiratory failure  --Likely multifactorial from sepsis and chronic narcotic use  --Was intubated perioperatively  --Increased O2 needs will check chest x-ray    Acute renal failure with left hydronephrosis  --Secondary to obstructive uropathy.  Creatinine on admission 2.25 baseline 1  --Status post a stent placement  --We will monitor renal functions closely  --Avoid nephrotoxins  --Urology consulted and following      Right femoral neck fracture  --Incidental finding on CT  --Patient has MS at baseline is able to pivot herself  --No pain noted on initial exam  --We will consult orthopedic surgery    Type 2 diabetes mellitus  --At baseline on metformin will hold for now  --Sliding scale insulin    MS with associated chronic pain  --Prior to admission on amitriptyline, baclofen, duloxetine, gabapentin and oxycodone  --PRN pain control  --PT evaluation    Significant leukocytosis  --Patient has history of T-cell non-Hodgkin's lymphoma status post treatment at baseline white cell count is in the mid teens  --Significant elevation likely due to sepsis    H/o Hypertension  --Was hypotensive on presentation  --Held losartan and hydrochlorothiazide    Bilateral inguinal wounds  --On admission concern for suppurative hydradenitis  --Wound culture sent  --1 dose of IV Vanco given for possible MRSA on admission          DVT Prophylaxis: Pneumatic Compression Devices  Code Status: Full  Code    Disposition: To be decided intubated      Interval History   Assumed care reviewed chart.  Patient sedated and intubated unable to get review of systems  Remains febrile  Heart rate controlled on amiodarone  Has required increased FiO2    -Data reviewed today: I reviewed all new labs and imaging reports over the last 24 hours. I personally reviewed no images or EKG's today.    Physical Exam   Temp: 100.4  F (38  C) Temp src: Axillary BP: 118/64 Pulse: 60 Heart Rate: 62 Resp: 17 SpO2: 93 % O2 Device: Mechanical Ventilator    Vitals:    01/31/19 0615 02/01/19 0315 02/02/19 0000   Weight: 126.2 kg (278 lb 3.5 oz) 127 kg (279 lb 15.8 oz) 130.5 kg (287 lb 11.2 oz)     Vital Signs with Ranges  Temp:  [99.1  F (37.3  C)-100.7  F (38.2  C)] 100.4  F (38  C)  Pulse:  [58-88] 60  Heart Rate:  [58-85] 62  Resp:  [17-22] 17  BP: ()/(45-82) 118/64  FiO2 (%):  [50 %-60 %] 60 %  SpO2:  [89 %-99 %] 93 %  I/O last 3 completed shifts:  In: 6864.18 [I.V.:5519.18; NG/GT:780]  Out: 1470 [Urine:1470]      GEN: Intubated and sedated NAD.  HEENT:  Normocephalic/atraumatic, no scleral icterus, no nasal discharge, mouth dry  CV: Tachycardic no murmur or JVD.  S1 + S2 noted, no S3 or S4.  LUNGSleft basilar crackles.  Symmetric chest rise on inhalation noted.  ABD:  Active bowel sounds, soft, non-tender/non-distended.  No rebound/guarding/rigidity.  Bilateral inguinal wounds ?  hidradenitis  EXT: + edema.  No cyanosis.    SKIN:  Dry to touch, chronic stasis dermatitis and bilateral inguinal hidradenitis    Medications     amiodarone 0.5 mg/min (02/02/19 0700)     dexmedetomidine 0.4 mcg/kg/hr (02/02/19 0700)     IV fluid REPLACEMENT ONLY       fentaNYL 100 mcg/hr (02/02/19 0701)     lactated ringers 125 mL/hr at 02/02/19 0600     midazolam Stopped (02/01/19 1354)     Patient RECEIVING antibiotic to treat a different condition and it provides ADEQUATE COVERAGE for this surgical procedure.       sodium chloride 10 mL/hr at 02/02/19  0600     sodium chloride 10 mL/hr at 02/02/19 0600     vasopressin (PITRESSIN) infusion ADULT (40 mL) 2.4 Units/hr (02/02/19 0700)       amitriptyline  100 mg Oral or Feeding Tube At Bedtime     aspirin  162 mg Oral or Feeding Tube Daily     atorvastatin  10 mg Oral or Feeding Tube Daily     baclofen  10 mg Oral or Feeding Tube TID     gabapentin  600 mg Oral or Feeding Tube TID     heparin  5,000 Units Subcutaneous Q8H     insulin aspart  1-6 Units Subcutaneous Q4H     lidocaine VISCOUS  5 mL Topical Once     multivitamins w/minerals  15 mL Per Feeding Tube Daily     piperacillin-tazobactam  3.375 g Intravenous Q6H     rantidine  150 mg Oral or Feeding Tube BID     vancomycin (VANCOCIN) IV  2,000 mg Intravenous Q12H     Data     Recent Labs   Lab 01/31/19  1753 01/31/19  0555 01/30/19  2220 01/30/19  1840 01/30/19  1635   PH  --  7.37 7.28* 7.29*  --    PHV 7.39  --   --   --  7.29*   PO2  --  102 108* 75*  --    PO2V 43  --   --   --  57*   PCO2  --  44 55* 52*  --    PCO2V 49  --   --   --  62*   HCO3  --  26 26 25  --    HCO3V 29*  --   --   --  30*     Recent Labs   Lab 02/02/19  0455 02/01/19  0535 01/31/19  1023   WBC 23.2* 23.8* 32.6*   HGB 9.8* 9.8* 11.9   HCT 31.1* 31.2* 37.3   MCV 82 81 82    157 178     Recent Labs   Lab 02/02/19  0455 02/01/19  0535 01/31/19  1614 01/31/19  1023  01/30/19  1258   NA  --  138  --  138  --  130*   POTASSIUM 4.1 3.9 3.9 3.3*  --  4.0   CHLORIDE  --  104  --  102  --  93*   CO2  --  29  --  26  --  25   ANIONGAP  --  5  --  10  --  12   GLC  --  161*  --  139*  --  107*   BUN  --  32*  --  41*  --  49*   CR 0.82 0.77  --  1.09*   < > 2.26*   GFRESTIMATED 81 87  --  57*   < > 24*   GFRESTBLACK >90 >90  --  66   < > 27*   MARY  --  7.1*  --  7.6*  --  7.6*    < > = values in this interval not displayed.     Recent Labs   Lab 01/31/19  0410 01/30/19  2120 01/30/19 2016 01/30/19  1522 01/30/19  1512   CULT Culture in progress Light growth  Escherichia coli  *  Moderate  growth  beta hemolytic   Streptococcus constellatus  *  Culture in progress  Moderate growth  beta hemolytic   Streptococcus constellatus  *  Culture in progress 50,000 to 100,000 colonies/mL  Lactose fermenting gram negative rods  *  10,000 to 50,000 colonies/mL  Strain 2  Lactose fermenting gram negative rods  *  <10,000 colonies/mL  Strain 3  Lactose fermenting gram negative rods  *  <10,000 colonies/mL  Strain 4  Lactose fermenting gram negative rods  *  Susceptibility testing not routinely done Cultured on the 1st day of incubation:  Escherichia coli  Susceptibility testing done on previous specimen  *  Critical Value/Significant Value, preliminary result only, called to and read back by  Maricarmen Woods  RN @0732 01/31/19 gd   Cultured on the 1st day of incubation:  Escherichia coli  *  Critical Value/Significant Value, preliminary result only, called to and read back by  SHAMAR PEREZ RN @0634 1/31/19. SCG    (Note)  POSITIVE for E.COLI by Verigene multiplex nucleic acid test. Final  identification and antimicrobial susceptibility testing will be  verified by standard methods. Verigene test will not distinguish  E.coli from Shigella species including S.dysenteriae, S.flexneri,  S.boydii, and S.sonnei. Specimens containing Shigella species or  E.coli will be reported as Positive for E.coli.    Specimen tested with Verigene multiplex, gram-negative blood culture  nucleic acid test for the following targets: Acinetobacter sp.,  Citrobacter sp., Enterobacter sp., Proteus sp., E. coli, K.  pneumoniae/oxytoca, P. aeruginosa, and the following resistance  markers: CTXM, KPC, NDM, VIM, IMP and OXA.    Critical Value/Significant Value called to and read back by MARICARMEN ORTEZ RN [facility] 0844 1.31.19        Recent Labs   Lab 02/02/19  0455 02/01/19  0535 01/31/19  1023   HGB 9.8* 9.8* 11.9     Recent Labs   Lab 01/30/19  1258   *   ALT 89*   ALKPHOS 163*   BILITOTAL 0.9     No results for input(s): INR  in the last 168 hours.  Recent Labs   Lab 01/30/19  1258   LACT 1.2     Recent Labs   Lab 01/30/19  1448   COLOR Stacy   APPEARANCE Cloudy   URINEGLC Negative   URINEBILI Negative   URINEKETONE Negative   SG 1.018   UBLD Moderate*   URINEPH 5.0   PROTEIN 100*   NITRITE Negative   LEUKEST Large*   RBCU 49*   WBCU 104*       Recent Results (from the past 24 hour(s))   XR Abdomen Port 1 View    Narrative    XR ABDOMEN PORT 1 VW 2/1/2019 2:30 PM    HISTORY: Assess feeding tube placement.    COMPARISON: 1/30/2019    FINDINGS: Enteric tube tip is in the projection of the stomach. Left  nephroureteral stent noted as well as lumbar hardware and electronic  pump.      Impression    IMPRESSION: Feeding tube tip appears to be in the stomach.    RICKY PATRICK MD

## 2019-02-02 NOTE — PROVIDER NOTIFICATION
DATE:  2/2/2019   TIME OF RECEIPT FROM LAB:  0600  LAB TEST:  CK Total  LAB VALUE:  1,027  RESULTS GIVEN WITH READ-BACK TO (PROVIDER): Tele-ICU notified.   TIME LAB VALUE REPORTED TO PROVIDER:   06:08

## 2019-02-02 NOTE — PROGRESS NOTES
ICU Attending Note    I have seen and examined Amanda Richardson, reviewed the patient's history, pertinent labs, vital signs, medications, physical exam, and radiographs.  The patient is critically ill by my examination and requires continued ICU monitoring and cares    Amanda Richardson is admitted to ICU with septic shock from ascending urinary tract infection secondary to obstructive ureteral stone; is s/p stent..    Recent Events:  Night before last has SVT requiring amio and cardioversion. Vasopressin used for BP and norepi stopped.  Has remained in SR since.  O2 needs have increased over yesterday. Today with new secretions.  I>>Os.      Exam:  Temp:  [99.2  F (37.3  C)-100.7  F (38.2  C)] 100  F (37.8  C)  Pulse:  [58-88] 65  Heart Rate:  [58-85] 63  Resp:  [14-22] 19  BP: ()/(45-82) 133/70  FiO2 (%):  [50 %-70 %] 70 %  SpO2:  [89 %-98 %] 90 %    Intake/Output Summary (Last 24 hours) at 2/2/2019 0942  Last data filed at 2/2/2019 0902  Gross per 24 hour   Intake 6910.45 ml   Output 1520 ml   Net 5390.45 ml     Ventilation Mode: CMV/AC  (Continuous Mandatory Ventilation/ Assist Control)  FiO2 (%): 70 %  Rate Set (breaths/minute): 20 breaths/min  Tidal Volume Set (mL): 550 mL  PEEP (cm H2O): 8 cmH2O  Oxygen Concentration (%): 60 %  Resp: 19    Lungs coarse  Heart rrr  abd obese, soft, right sided mass (baclofen pump)  Ext warm, perfused  intertrigenous fungus      Results:  ABG   Recent Labs   Lab 01/31/19  0555 01/30/19  2220 01/30/19  1840   PH 7.37 7.28* 7.29*   PCO2 44 55* 52*   PO2 102 108* 75*   HCO3 26 26 25     CBC  Recent Labs   Lab 02/02/19  0455 02/01/19  0535 01/31/19  1023 01/30/19  1258   WBC 23.2* 23.8* 32.6* 47.7*   HGB 9.8* 9.8* 11.9 12.9   HCT 31.1* 31.2* 37.3 40.3    157 178 203     BMP  Recent Labs   Lab 02/02/19  0455 02/01/19  0535 01/31/19  1614 01/31/19  1023 01/31/19  0520 01/30/19  1258   NA  --  138  --  138  --  130*   POTASSIUM 4.1 3.9 3.9 3.3*  --  4.0   CHLORIDE  --   104  --  102  --  93*   CO2  --  29  --  26  --  25   BUN  --  32*  --  41*  --  49*   CR 0.82 0.77  --  1.09* 1.46* 2.26*   GLC  --  161*  --  139*  --  107*     LFT  Recent Labs   Lab 01/30/19  1258   *   ALT 89*   ALKPHOS 163*   BILITOTAL 0.9   ALBUMIN 2.1*     PancreasNo lab results found in last 7 days.  INR  Lab Results   Component Value Date    INR 0.93 10/03/2013       Current Issues:    CNS: Sedated.  - Pain: Acute/chronic. Continue fentanyl for now. Would not escalate.  - MS: Continue Amitriptyline, baclofen, gabapentin. Resume duloxetine when able.  - Sedation: dexmedetomidine    Pulm: Hypoxic respiratory failure with secretions, didn't respond to recruitment maneuver  -Increased PEEP on vent  -CXR  -Fluid up, furosemide, stop IVF    CV: Septic shock and hypotension.  BP good with vasopressin. .  - Septic shock: wean vasopressin as able.  It may be contributing to fluid retention (ADH).  - SVT vs atrial flutter: Plan to continue amiodarone gtt for tonight and transition to enteral tomorrow.    FEN/GI: Abdomen benign.  - Protein-calorie malnutrition: Continue tube feedings.   - Morbid obesity affecting cares: BMI 48.  - Wound not treat intertriginous wound cultures    : UOP good, urine clear.  - JULIANNE: Resolved.  - Ureteral stone: s/p stenting; urology following. Will need stone extraction eventually.  - K/Mg protocols.    Heme: Hb 9.8 (11.9).  - No indication to transfuse for mild anemia.    Msk: Extremities warm, well-perfused.   - Intertrigonal rash: Is getting nystatin. Cultures from swab polymicrobial, which is not unexpected. Follow.    Endo: Glucose 145-161.  - DM2: Sliding scale insulin.    ID: Afebrile, WBCs continue to trend down.  - Urinary tract infection  - E coli bacteremia, multiple lactose fermenting GNR, pip/tazo likely to be long term  -> Sensitivities pending, continue pip/tazo and vancomycin until culture results finalize.    ICU: [facility], .    Evaluation and management time  exclusive of procedures was 30 minutes critical care time including:  examination with the ICU team, discussion of the patient's condition with other physicians and members of the care team, reviewing all data related to the patient, and time utilizing the EMR for documentation of this patient's care.      Gigi Alvarez MD  Acute Care Surgery/Critical Care

## 2019-02-02 NOTE — PROGRESS NOTES
Respiratory Therapy Note    Patient seen resting in bed on mechanical ventilator settings:    Ventilation Mode: CMV/AC  (Continuous Mandatory Ventilation/ Assist Control)  FiO2 (%): 70 %  Rate Set (breaths/minute): 18 breaths/min  Tidal Volume Set (mL): 550 mL  PEEP (cm H2O): 10 cmH2O  Oxygen Concentration (%): (S) 100 %  Resp: 17    Respiratory rate 20-22, breath sounds coarse with wheezes, and SpO2 92%. Suctioning moderate, creamy/brown, thick secretions from ETT. RT will continue to monitor.    Linette Heck  3:04 PM February 2, 2019

## 2019-02-02 NOTE — PLAN OF CARE
ICU End of Shift Summary.  For vital signs and complete assessments, please see documentation flowsheets.     Pertinent assessments: Patient remains intubated and sedated in ICU, on pressor support with low grade temp. Amiodarone gtt continues (patient in 1st degree AV block with BBB overnight, HR high 50's to 60's no signs of aflutter or afib). Fentanyl gtt continues with CPOT 0. Precedex gtt titrated down to 0.4mcg/kg/hr for RASS goal of -2. Lungs coarse with inspiratory wheezes noted in upper lobes bilaterally, coarse, especially on R side. PIPs in upper 30's. Bowel sounds active, abdomen nondistended, no BM overnight. Skin in poor condition, please see WOC orders.   Major Shift Events: Tube feeds increased to 40ml/hr per orders. Critical CK Total of 1,027 this morning. Low grade temp overnight. Precedex weaned down to 0.4mcg/kg/hr. Unable to wean FiO2 below 60%. Large brown/rust coloured mucous plug suctioned from ETT with lavage. Patient will occasionally desat to mid 80's with turns. Suspected PI on buttocks, WOC reconsulted.   Plan (Upcoming Events): CK recheck 12:30pm. Continue to attempt to wean O2 support and sedation as able.   Discharge/Transfer Needs: TBD    Bedside Shift Report Completed : y  Bedside Safety Check Completed:y

## 2019-02-02 NOTE — PROGRESS NOTES
CLINICAL NUTRITION SERVICES - REASSESSMENT NOTE      MALNUTRITION: (2/02/2019)  % Weight Loss: None during admit --> unable to verify PTA  % Intake: Decreased intake does not meet criteria --> unable to verify PTA  Subcutaneous Fat Loss: None observed   Muscle Loss: Mild temporal scooping, baseline LBM masked by adiposity  Fluid Retention: At least mild --> not using as indicator at this time as do not suspect masking true wt trends during admit     Malnutrition Diagnosis: patient does not meet malnutrition criteria during admit, unable to verify PTA       EVALUATION OF PROGRESS TOWARD GOALS  Diet: Remains NPO with intubation.    Nutrition Support: Initiation of EN at trophic rate d/t pressor needs/hymodynamic instability 1/31.  Began advancement yesterday, at 55 ml/hr this AM.  Will reach goal rate today.  Goal regimen outlined below:       Type of Feeding Tube: 10 fr NG (2/01/2019, previous OG for feeding)    Enteral Frequency:  Continuous    Enteral Regimen: Peptamen Intense VHP at 65 mL/hr    Total Enteral Provisions: 1560 mL provides 1560 kcal (13 kcal/kg), 143 g protein (2.6 g/kg), 1310 ml free H2O, 119 g CHO and 6 g Fiber daily.     Meets >100% of DRI's --> Ok to continue daily MVI/M while advancing to goal    Free Water Flush: standard 60 mL q4 hours    Intake: Meeting >75% needs today, otherwise previously meeting <75% needs via EN + Propofol (now off) x 3 days since admission.      Biochemical review:  - BG <180  - Na yesterday WNL  - K and phos WNL this AM    Wt trending:  - Slight uptrend, likely fluid related. + 11 L since admit:  Vitals:    01/30/19 2103 01/31/19 0615 02/01/19 0315 02/02/19 0000   Weight: 127.6 kg (281 lb 4.9 oz) 126.2 kg (278 lb 3.5 oz) 127 kg (279 lb 15.8 oz) 130.5 kg (287 lb 11.2 oz)     Stooling:  - No BM since admit  - No scheduled or prn bowel regimen     ASSESSED NUTRITION NEEDS (PER APPROVED PRACTICE GUIDELINES, Dosing weight: 126 kg admit wt for energy, 54.5 kg IBW for  protein):  Estimated Energy Needs: 8106-5401 kcals (11-14 Kcal/Kg)  Justification: obese a- nd vented  Estimated Protein Needs: >109 grams protein (>2 g pro/Kg)  Justification: hypercatabolism with critical illness  Estimated Fluid Needs: per MD  Justification: per MD      NEW FINDINGS:   - Medications reviewed:   Sliding scale insulin   Daily MVI/M   Precedex   Fluids TKO   Low-rate vaso    Previous Goals:   TF to meet % of estimated nutrition needs in the next 48 hours  Evaluation: Unable to evaluate as still w/in timeframe, will meet today    Previous Nutrition Diagnosis:   Inadequate protein intake related to NPO, intubated and kcal from propofol as evidenced by meeting 0% of estimated protein needs and 100% of estimated energy needs x 1 day.  Evaluation: Completed, changed below       CURRENT NUTRITION DIAGNOSIS  Predicted suboptimal nutrient intake (energy/protein) related to EN to advance to goal rate today, reliant since 1/31 with continued intubation, potential for interruptions to regimen.    INTERVENTIONS  Recommendations / Nutrition Prescription  Continue advancement to goal rate with goal regimen as outlined above.      Remains intubated/enteral reliant without BM since admit.  Begin bowel regimen per MD.      Implementation  Collaboration and Referral of Nutrition care: Discussed need for bowel regimen with RN and MD.     Goals  EN to meet % daily needs while NPO.      MONITORING AND EVALUATION:  Progress towards goals will be monitored and evaluated per protocol and Practice Guidelines      Saba Anderson RD, LD  Clinical Dietitian  3rd floor/ICU: [phone]  All other floors: [phone]  Weekend/holiday: [phone]

## 2019-02-02 NOTE — PROGRESS NOTES
Cardiology Progress Note  February 2, 2019    Amanda Richardson  [id number]    IMPRESSION/PLAN:    The patient is a 53-year-old female admitted with urosepsis and found to have atrial flutter.  She has subsequently been converted to sinus rhythm and remains on IV amiodarone.  I would continue IV amiodarone for another 24 hours.  Her transthoracic echocardiogram shows preserved systolic function with mild left atrial enlargement.  Given her elevated Chads VASC score if she has recurrence of either atrial fibrillation or atrial flutter I would consider long-term anticoagulation.    Rigo Anders MD    Interval history    Patient is intubated and sedated.  She remains in sinus rhythm.    Intake/Output Summary (Last 24 hours) at 2/2/2019 1319  Last data filed at 2/2/2019 1200  Gross per 24 hour   Intake 6290.45 ml   Output 2495 ml   Net 3795.45 ml       Wt Readings from Last 4 Encounters:   02/02/19 130.5 kg (287 lb 11.2 oz)   09/19/18 111.1 kg (245 lb)   07/16/18 117.9 kg (260 lb)   06/13/18 117.9 kg (260 lb)       Past Medical History:   Diagnosis Date     Abnormality of gait 7/27/2012     Chronic pain     [facility] - yearly, next in summer 2018     CKD (chronic kidney disease) stage 1, GFR 90 ml/min or greater      Colon polyps 1/15    tubular adenomas x 2     Gallstone 6/11/2012     Hyperlipidemia LDL goal <70      Hypertension goal BP (blood pressure) < 140/90      Leukocytosis 6/11/2012     Moderate depressive episode (H)      MS (multiple sclerosis) (H) 2003    Dr Vigil/Gaby - [facility]     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     Tobacco abuse 06/11/2012    former     Type 2 diabetes, HbA1c goal < 7% (H)        Current Facility-Administered Medications   Medication Dose Route Frequency     amitriptyline  100 mg Oral or Feeding Tube At Bedtime     aspirin  162 mg Oral or Feeding Tube Daily     atorvastatin  10 mg Oral or Feeding Tube Daily     baclofen  10 mg Oral or Feeding Tube TID     gabapentin  600 mg Oral or Feeding Tube TID     heparin  5,000 Units Subcutaneous Q8H     insulin aspart  1-6 Units Subcutaneous Q4H     lidocaine VISCOUS  5 mL Topical Once     multivitamins w/minerals  15 mL Per Feeding Tube Daily     piperacillin-tazobactam  3.375 g Intravenous Q6H     rantidine  150 mg Oral or Feeding Tube BID     sodium chloride (PF)  3 mL Intracatheter Q8H     vancomycin (VANCOCIN) IV  2,000 mg Intravenous Q12H         PE:  /62   Pulse 62   Temp 100  F (37.8  C) (Axillary)   Resp 16   Ht 1.626 m (5' 4\")   Wt 130.5 kg (287 lb 11.2 oz)   LMP 12/11/2011   SpO2 93%   BMI 49.38 kg/m    The patient is intubated and sedated  JVD cannot be assessed due to her body habitus  Heart is regular without significant murmurs rubs or gallops  Lungs are clear  Abdomen is soft and nontender  Lower extremity shows trivial edema      Last Basic Metabolic Panel:  Lab Results   Component Value Date     02/01/2019      Lab Results   Component Value Date    POTASSIUM 4.1 02/02/2019     Lab Results   Component Value Date    CHLORIDE 104 02/01/2019     Lab Results   Component Value Date    MARY 7.1 02/01/2019     Lab Results   Component Value Date    CO2 29 02/01/2019     Lab Results   Component Value Date    BUN 32 02/01/2019     Lab Results   Component Value Date    CR 0.82 02/02/2019     Lab Results   Component Value Date     " 02/01/2019       Lab Results   Component Value Date    TROPI 0.036 02/01/2019    TROPI 0.032 02/01/2019    TROPI 0.057 (H) 01/31/2019       Lab Results   Component Value Date    WBC 23.2 02/02/2019     Lab Results   Component Value Date    RBC 3.79 02/02/2019     Lab Results   Component Value Date    HGB 9.8 02/02/2019     Lab Results   Component Value Date    HCT 31.1 02/02/2019     No components found for: MCT  Lab Results   Component Value Date    MCV 82 02/02/2019     Lab Results   Component Value Date    MCH 25.9 02/02/2019     Lab Results   Component Value Date    MCHC 31.5 02/02/2019     Lab Results   Component Value Date    RDW 17.7 02/02/2019     Lab Results   Component Value Date     02/02/2019

## 2019-02-03 LAB
ANION GAP SERPL CALCULATED.3IONS-SCNC: 8 MMOL/L (ref 3–14)
ANISOCYTOSIS BLD QL SMEAR: SLIGHT
BACTERIA SPEC CULT: ABNORMAL
BASOPHILS # BLD AUTO: 0.2 10E9/L (ref 0–0.2)
BASOPHILS NFR BLD AUTO: 1 %
BUN SERPL-MCNC: 33 MG/DL (ref 7–30)
CALCIUM SERPL-MCNC: 7.2 MG/DL (ref 8.5–10.1)
CHLORIDE SERPL-SCNC: 103 MMOL/L (ref 94–109)
CO2 SERPL-SCNC: 29 MMOL/L (ref 20–32)
CREAT SERPL-MCNC: 0.81 MG/DL (ref 0.52–1.04)
DIFFERENTIAL METHOD BLD: ABNORMAL
EOSINOPHIL # BLD AUTO: 0.4 10E9/L (ref 0–0.7)
EOSINOPHIL NFR BLD AUTO: 2 %
ERYTHROCYTE [DISTWIDTH] IN BLOOD BY AUTOMATED COUNT: 17.2 % (ref 10–15)
GFR SERPL CREATININE-BSD FRML MDRD: 81 ML/MIN/{1.73_M2}
GLUCOSE BLDC GLUCOMTR-MCNC: 117 MG/DL (ref 70–99)
GLUCOSE BLDC GLUCOMTR-MCNC: 136 MG/DL (ref 70–99)
GLUCOSE BLDC GLUCOMTR-MCNC: 144 MG/DL (ref 70–99)
GLUCOSE BLDC GLUCOMTR-MCNC: 153 MG/DL (ref 70–99)
GLUCOSE BLDC GLUCOMTR-MCNC: 157 MG/DL (ref 70–99)
GLUCOSE SERPL-MCNC: 148 MG/DL (ref 70–99)
HCT VFR BLD AUTO: 29.5 % (ref 35–47)
HGB BLD-MCNC: 9.3 G/DL (ref 11.7–15.7)
LYMPHOCYTES # BLD AUTO: 1.6 10E9/L (ref 0.8–5.3)
LYMPHOCYTES NFR BLD AUTO: 8 %
Lab: ABNORMAL
MAGNESIUM SERPL-MCNC: 1.5 MG/DL (ref 1.6–2.3)
MAGNESIUM SERPL-MCNC: 2.5 MG/DL (ref 1.6–2.3)
MCH RBC QN AUTO: 25.9 PG (ref 26.5–33)
MCHC RBC AUTO-ENTMCNC: 31.5 G/DL (ref 31.5–36.5)
MCV RBC AUTO: 82 FL (ref 78–100)
METAMYELOCYTES # BLD: 0.2 10E9/L
METAMYELOCYTES NFR BLD MANUAL: 1 %
MONOCYTES # BLD AUTO: 1.4 10E9/L (ref 0–1.3)
MONOCYTES NFR BLD AUTO: 7 %
MYELOCYTES # BLD: 0.4 10E9/L
MYELOCYTES NFR BLD MANUAL: 2 %
NEUTROPHILS # BLD AUTO: 15.4 10E9/L (ref 1.6–8.3)
NEUTROPHILS NFR BLD AUTO: 79 %
PHOSPHATE SERPL-MCNC: 3.5 MG/DL (ref 2.5–4.5)
PLATELET # BLD AUTO: 209 10E9/L (ref 150–450)
PLATELET # BLD EST: ABNORMAL 10*3/UL
POTASSIUM SERPL-SCNC: 3.5 MMOL/L (ref 3.4–5.3)
POTASSIUM SERPL-SCNC: 3.6 MMOL/L (ref 3.4–5.3)
RBC # BLD AUTO: 3.59 10E12/L (ref 3.8–5.2)
SODIUM SERPL-SCNC: 140 MMOL/L (ref 133–144)
SPECIMEN SOURCE: ABNORMAL
VANCOMYCIN SERPL-MCNC: 24.7 MG/L
WBC # BLD AUTO: 19.5 10E9/L (ref 4–11)

## 2019-02-03 PROCEDURE — 94003 VENT MGMT INPAT SUBQ DAY: CPT

## 2019-02-03 PROCEDURE — 80202 ASSAY OF VANCOMYCIN: CPT | Performed by: INTERNAL MEDICINE

## 2019-02-03 PROCEDURE — 84132 ASSAY OF SERUM POTASSIUM: CPT | Performed by: INTERNAL MEDICINE

## 2019-02-03 PROCEDURE — 25000128 H RX IP 250 OP 636: Performed by: INTERNAL MEDICINE

## 2019-02-03 PROCEDURE — 20000003 ZZH R&B ICU

## 2019-02-03 PROCEDURE — 99291 CRITICAL CARE FIRST HOUR: CPT | Performed by: SURGERY

## 2019-02-03 PROCEDURE — 25000128 H RX IP 250 OP 636: Performed by: SURGERY

## 2019-02-03 PROCEDURE — 00000146 ZZHCL STATISTIC GLUCOSE BY METER IP

## 2019-02-03 PROCEDURE — 40000275 ZZH STATISTIC RCP TIME EA 10 MIN

## 2019-02-03 PROCEDURE — 25000132 ZZH RX MED GY IP 250 OP 250 PS 637: Performed by: SURGERY

## 2019-02-03 PROCEDURE — 83735 ASSAY OF MAGNESIUM: CPT | Performed by: INTERNAL MEDICINE

## 2019-02-03 PROCEDURE — 85025 COMPLETE CBC W/AUTO DIFF WBC: CPT | Performed by: INTERNAL MEDICINE

## 2019-02-03 PROCEDURE — 27210437 ZZH NUTRITION PRODUCT SEMIELEM INTERMED LITER

## 2019-02-03 PROCEDURE — 25000132 ZZH RX MED GY IP 250 OP 250 PS 637: Performed by: INTERNAL MEDICINE

## 2019-02-03 PROCEDURE — 99232 SBSQ HOSP IP/OBS MODERATE 35: CPT | Performed by: INTERNAL MEDICINE

## 2019-02-03 PROCEDURE — 80048 BASIC METABOLIC PNL TOTAL CA: CPT | Performed by: INTERNAL MEDICINE

## 2019-02-03 PROCEDURE — 25000125 ZZHC RX 250: Performed by: SURGERY

## 2019-02-03 PROCEDURE — 84100 ASSAY OF PHOSPHORUS: CPT | Performed by: INTERNAL MEDICINE

## 2019-02-03 PROCEDURE — 99233 SBSQ HOSP IP/OBS HIGH 50: CPT | Performed by: INTERNAL MEDICINE

## 2019-02-03 RX ORDER — CEFAZOLIN SODIUM 1 G/50ML
1750 SOLUTION INTRAVENOUS EVERY 12 HOURS
Status: DISCONTINUED | OUTPATIENT
Start: 2019-02-04 | End: 2019-02-04

## 2019-02-03 RX ORDER — HEPARIN SODIUM,PORCINE 10 UNIT/ML
5-10 VIAL (ML) INTRAVENOUS
Status: DISCONTINUED | OUTPATIENT
Start: 2019-02-03 | End: 2019-02-27 | Stop reason: HOSPADM

## 2019-02-03 RX ORDER — AMIODARONE HYDROCHLORIDE 200 MG/1
200 TABLET ORAL DAILY
Status: DISCONTINUED | OUTPATIENT
Start: 2019-02-10 | End: 2019-02-09

## 2019-02-03 RX ORDER — HEPARIN SODIUM,PORCINE 10 UNIT/ML
5-10 VIAL (ML) INTRAVENOUS EVERY 24 HOURS
Status: DISCONTINUED | OUTPATIENT
Start: 2019-02-03 | End: 2019-02-10

## 2019-02-03 RX ORDER — FUROSEMIDE 10 MG/ML
40 INJECTION INTRAMUSCULAR; INTRAVENOUS ONCE
Status: COMPLETED | OUTPATIENT
Start: 2019-02-03 | End: 2019-02-03

## 2019-02-03 RX ORDER — AMIODARONE HYDROCHLORIDE 200 MG/1
400 TABLET ORAL DAILY
Status: COMPLETED | OUTPATIENT
Start: 2019-02-03 | End: 2019-02-09

## 2019-02-03 RX ADMIN — VANCOMYCIN HYDROCHLORIDE 2000 MG: 10 INJECTION, POWDER, LYOPHILIZED, FOR SOLUTION INTRAVENOUS at 00:17

## 2019-02-03 RX ADMIN — AMITRIPTYLINE HYDROCHLORIDE 100 MG: 50 TABLET, FILM COATED ORAL at 21:49

## 2019-02-03 RX ADMIN — RANITIDINE HYDROCHLORIDE 150 MG: 15 SOLUTION ORAL at 21:49

## 2019-02-03 RX ADMIN — GABAPENTIN 600 MG: 600 TABLET, FILM COATED ORAL at 16:11

## 2019-02-03 RX ADMIN — BACLOFEN 10 MG: 10 TABLET ORAL at 21:49

## 2019-02-03 RX ADMIN — Medication 100 MCG/HR: at 13:00

## 2019-02-03 RX ADMIN — ATORVASTATIN CALCIUM 10 MG: 10 TABLET, FILM COATED ORAL at 08:46

## 2019-02-03 RX ADMIN — INSULIN ASPART 1 UNITS: 100 INJECTION, SOLUTION INTRAVENOUS; SUBCUTANEOUS at 08:09

## 2019-02-03 RX ADMIN — MAGNESIUM SULFATE HEPTAHYDRATE 4 G: 40 INJECTION, SOLUTION INTRAVENOUS at 09:14

## 2019-02-03 RX ADMIN — VASOPRESSIN 2.4 UNITS/HR: 20 INJECTION INTRAVENOUS at 02:39

## 2019-02-03 RX ADMIN — TAZOBACTAM SODIUM AND PIPERACILLIN SODIUM 3.38 G: 375; 3 INJECTION, SOLUTION INTRAVENOUS at 21:49

## 2019-02-03 RX ADMIN — FUROSEMIDE 40 MG: 10 INJECTION, SOLUTION INTRAMUSCULAR; INTRAVENOUS at 14:33

## 2019-02-03 RX ADMIN — MULTIVITAMIN 15 ML: LIQUID ORAL at 08:47

## 2019-02-03 RX ADMIN — FUROSEMIDE 40 MG: 10 INJECTION, SOLUTION INTRAMUSCULAR; INTRAVENOUS at 08:48

## 2019-02-03 RX ADMIN — TAZOBACTAM SODIUM AND PIPERACILLIN SODIUM 3.38 G: 375; 3 INJECTION, SOLUTION INTRAVENOUS at 08:41

## 2019-02-03 RX ADMIN — VANCOMYCIN HYDROCHLORIDE 2000 MG: 10 INJECTION, POWDER, LYOPHILIZED, FOR SOLUTION INTRAVENOUS at 12:21

## 2019-02-03 RX ADMIN — POTASSIUM CHLORIDE 20 MEQ: 1.5 POWDER, FOR SOLUTION ORAL at 14:34

## 2019-02-03 RX ADMIN — AMIODARONE HYDROCHLORIDE 400 MG: 200 TABLET ORAL at 12:36

## 2019-02-03 RX ADMIN — DEXMEDETOMIDINE 0.6 MCG/KG/HR: 100 INJECTION, SOLUTION, CONCENTRATE INTRAVENOUS at 21:28

## 2019-02-03 RX ADMIN — DEXMEDETOMIDINE 0.4 MCG/KG/HR: 100 INJECTION, SOLUTION, CONCENTRATE INTRAVENOUS at 02:52

## 2019-02-03 RX ADMIN — TAZOBACTAM SODIUM AND PIPERACILLIN SODIUM 3.38 G: 375; 3 INJECTION, SOLUTION INTRAVENOUS at 16:11

## 2019-02-03 RX ADMIN — HEPARIN SODIUM 5000 UNITS: 5000 INJECTION, SOLUTION INTRAVENOUS; SUBCUTANEOUS at 16:11

## 2019-02-03 RX ADMIN — BACLOFEN 10 MG: 10 TABLET ORAL at 08:46

## 2019-02-03 RX ADMIN — GABAPENTIN 600 MG: 600 TABLET, FILM COATED ORAL at 08:46

## 2019-02-03 RX ADMIN — POTASSIUM CHLORIDE 20 MEQ: 1.5 POWDER, FOR SOLUTION ORAL at 08:47

## 2019-02-03 RX ADMIN — RANITIDINE HYDROCHLORIDE 150 MG: 15 SOLUTION ORAL at 08:48

## 2019-02-03 RX ADMIN — GABAPENTIN 600 MG: 600 TABLET, FILM COATED ORAL at 21:49

## 2019-02-03 RX ADMIN — BACLOFEN 10 MG: 10 TABLET ORAL at 16:11

## 2019-02-03 RX ADMIN — DEXMEDETOMIDINE 0.4 MCG/KG/HR: 100 INJECTION, SOLUTION, CONCENTRATE INTRAVENOUS at 12:46

## 2019-02-03 RX ADMIN — HEPARIN SODIUM 5000 UNITS: 5000 INJECTION, SOLUTION INTRAVENOUS; SUBCUTANEOUS at 08:49

## 2019-02-03 RX ADMIN — INSULIN ASPART 1 UNITS: 100 INJECTION, SOLUTION INTRAVENOUS; SUBCUTANEOUS at 03:52

## 2019-02-03 RX ADMIN — TAZOBACTAM SODIUM AND PIPERACILLIN SODIUM 3.38 G: 375; 3 INJECTION, SOLUTION INTRAVENOUS at 04:51

## 2019-02-03 RX ADMIN — HEPARIN SODIUM 5000 UNITS: 5000 INJECTION, SOLUTION INTRAVENOUS; SUBCUTANEOUS at 00:17

## 2019-02-03 RX ADMIN — AMIODARONE HYDROCHLORIDE 0.5 MG/MIN: 50 INJECTION, SOLUTION INTRAVENOUS at 04:52

## 2019-02-03 RX ADMIN — ASPIRIN 81 MG 162 MG: 81 TABLET ORAL at 08:46

## 2019-02-03 ASSESSMENT — ACTIVITIES OF DAILY LIVING (ADL)
ADLS_ACUITY_SCORE: 32

## 2019-02-03 ASSESSMENT — MIFFLIN-ST. JEOR: SCORE: 1889

## 2019-02-03 NOTE — PROGRESS NOTES
Cardiology Progress Note  February 3, 2019    Amanda Richardson  [id number]    IMPRESSION/PLAN:    The patient is a 53-year-old female admitted with urosepsis and found to have atrial flutter.  She has subsequently been converted to sinus rhythm and remains on IV amiodarone.  We will change to p.o. amiodarone today.  I would likely give her only a 2-week course of amiodarone as this was a transient bout of atrial flutter related to her acute illness.  Given her elevated Chads VASC score if she has recurrence of either atrial fibrillation or atrial flutter I would consider long-term anticoagulation.    Rigo Anders MD    Interval history    Patient is intubated and sedated.  She remains in sinus rhythm.    Intake/Output Summary (Last 24 hours) at 2/2/2019 1319  Last data filed at 2/2/2019 1200  Gross per 24 hour   Intake 6290.45 ml   Output 2495 ml   Net 3795.45 ml       Wt Readings from Last 4 Encounters:   02/03/19 130.9 kg (288 lb 9.3 oz)   09/19/18 111.1 kg (245 lb)   07/16/18 117.9 kg (260 lb)   06/13/18 117.9 kg (260 lb)       Past Medical History:   Diagnosis Date     Abnormality of gait 7/27/2012     Chronic pain     [facility] - yearly, next in summer 2018     CKD (chronic kidney disease) stage 1, GFR 90 ml/min or greater      Colon polyps 1/15    tubular adenomas x 2     Gallstone 6/11/2012     Hyperlipidemia LDL goal <70      Hypertension goal BP (blood pressure) < 140/90      Leukocytosis 6/11/2012     Moderate depressive episode (H)      MS (multiple sclerosis) (H) 2003    Dr Vigil/Gaby - [facility]     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     Tobacco abuse 06/11/2012    former     Type 2 diabetes, HbA1c goal < 7% (H)        Current Facility-Administered Medications   Medication Dose Route Frequency     amiodarone  400 mg Oral Daily     amitriptyline  100 mg Oral or Feeding Tube At Bedtime     aspirin  162 mg Oral or Feeding Tube Daily     atorvastatin  10 mg Oral or Feeding Tube Daily     baclofen  10 mg Oral or Feeding Tube TID     gabapentin  600 mg Oral or Feeding Tube TID     heparin lock flush  5-10 mL Intracatheter Q24H     heparin  5,000 Units Subcutaneous Q8H     insulin aspart  1-6 Units Subcutaneous Q4H     lidocaine VISCOUS  5 mL Topical Once     multivitamins w/minerals  15 mL Per Feeding Tube Daily     piperacillin-tazobactam  3.375 g Intravenous Q6H     rantidine  150 mg Oral or Feeding Tube BID     sodium chloride (PF)  3 mL Intracatheter Q8H     vancomycin (VANCOCIN) IV  2,000 mg Intravenous Q12H         PE:  /51   Pulse 82   Temp 100.3  F (37.9  C) (Axillary)   Resp 14   Ht 1.626 m (5' 4\")   Wt 130.9 kg (288 lb 9.3 oz)   LMP 12/11/2011   SpO2 94%   BMI 49.53 kg/m    The patient is intubated and sedated  JVD cannot be assessed due to her body habitus  Heart is regular without significant murmurs rubs or gallops  Lungs are clear  Abdomen is soft and nontender  Lower extremity shows trivial edema      Last Basic Metabolic Panel:  Lab Results   Component Value Date     02/01/2019      Lab Results   Component Value Date    POTASSIUM 4.1 02/02/2019     Lab Results   Component Value Date    CHLORIDE 104 02/01/2019     Lab Results   Component Value Date    MARY 7.1 02/01/2019     Lab Results   Component Value Date    CO2 29 02/01/2019     Lab Results   Component Value Date    BUN 32 02/01/2019 "     Lab Results   Component Value Date    CR 0.82 02/02/2019     Lab Results   Component Value Date     02/01/2019       Lab Results   Component Value Date    TROPI 0.036 02/01/2019    TROPI 0.032 02/01/2019    TROPI 0.057 (H) 01/31/2019       Lab Results   Component Value Date    WBC 23.2 02/02/2019     Lab Results   Component Value Date    RBC 3.79 02/02/2019     Lab Results   Component Value Date    HGB 9.8 02/02/2019     Lab Results   Component Value Date    HCT 31.1 02/02/2019     No components found for: MCT  Lab Results   Component Value Date    MCV 82 02/02/2019     Lab Results   Component Value Date    MCH 25.9 02/02/2019     Lab Results   Component Value Date    MCHC 31.5 02/02/2019     Lab Results   Component Value Date    RDW 17.7 02/02/2019     Lab Results   Component Value Date     02/02/2019

## 2019-02-03 NOTE — PLAN OF CARE
ICU End of Shift Summary.  For vital signs and complete assessments, please see documentation flowsheets.     Pertinent assessments: Afebrile, tele SR with 1 degree AVB, HR in 70s. SpO2 92-97% on 65% FiO2, PEEP 10, , RR 14. Tolerating vent. LS coarse throughout. Small amount of sputum. Pt diuresed today prior to this RN shift, large amount of UOP via ramos (see flowsheets for accurate output). Pt on 0.5 of precedex and Fentanyl at 100.   Major Shift Events: zosyn, UOP, titrated precedex, changed vent circuit d/t debris in tubing  Plan (Upcoming Events): Cont current POC  Discharge/Transfer Needs: TBD    Bedside Shift Report Completed :   Bedside Safety Check Completed:    Right wrist and Left wrist restraints continued 2/3/2019    Clinical Justification: Pulling lines, pulling tubes, and pulling equipment  Less Restrictive Alternative: 1:1 patient care, Repositioning, Re-evaluate equipment, Disguise equipment, Pain management, Alarm, De-escalation, Reorientation  Attending Physician Notified: No (comment),     New orders placed Yes  Length of Order: 1 Day      Rosemary Garcia

## 2019-02-03 NOTE — PLAN OF CARE
soft restraints restraints continued 2/3/2019    Clinical Justification: Pulling lines, pulling tubes, and pulling equipment  Less Restrictive Alternative: 1:1 patient care, Repositioning, Re-evaluate equipment, Disguise equipment, Pain management, Alarm, De-escalation, Reorientation  Attending Physician Notified: No (comment),     New orders placed Yes  Length of Order: 1 Day  ICU End of Shift Summary.  For vital signs and complete assessments, please see documentation flowsheets.     Pertinent assessments: Patietn remains intubated and sedated, arrouses to voice. Doesn't follow commands. Tolerating tube feeds. Good urine output after lasix.  Major Shift Events: pressor and amiodorone off.  Plan (Upcoming Events): continue to monitor  Discharge/Transfer Needs: to be determined    Bedside Shift Report Completed : yes  Bedside Safety Check Completed:yes           Marlena Vides

## 2019-02-03 NOTE — PROGRESS NOTES
ICU Attending Note    I have seen and examined Amanda Richardson, reviewed the patient's history, pertinent labs, vital signs, medications, physical exam, and radiographs.  The patient is critically ill by my examination and requires continued ICU monitoring and cares    Amanda Richardson is admitted to ICU with septic shock from ascending urinary tract infection secondary to obstructive ureteral stone; is s/p stent..    Recent Events:  Negative about 500 mL yesterday with two doses of furosemide.  Vasopressin off during the day and restarted at night.  BP in 140-150 this am.  O2 needs still high..      Exam:  Temp:  [99.4  F (37.4  C)-100.2  F (37.9  C)] 99.5  F (37.5  C)  Pulse:  [57-89] 69  Heart Rate:  [60-89] 76  Resp:  [12-22] 19  BP: ()/(42-82) 133/62  FiO2 (%):  [65 %-100 %] 70 %  SpO2:  [88 %-97 %] 94 %      Intake/Output Summary (Last 24 hours) at 2/3/2019 0818  Last data filed at 2/3/2019 0600  Gross per 24 hour   Intake 3445.92 ml   Output 3925 ml   Net -479.08 ml         Ventilation Mode: CMV/AC  (Continuous Mandatory Ventilation/ Assist Control)  FiO2 (%): 70 %  Rate Set (breaths/minute): 18 breaths/min  Tidal Volume Set (mL): 550 mL  PEEP (cm H2O): 10 cmH2O  Oxygen Concentration (%): 70 %  Resp: 19    Lungs coarse, shallow  Heart rrr  abd obese, soft, right sided mass (baclofen pump)  Ext warm, perfused  intertrigenous fungus      Results:  ABG   Recent Labs   Lab 01/31/19  0555 01/30/19  2220 01/30/19  1840   PH 7.37 7.28* 7.29*   PCO2 44 55* 52*   PO2 102 108* 75*   HCO3 26 26 25     CBC  Recent Labs   Lab 02/03/19  0600 02/02/19  0455 02/01/19  0535 01/31/19  1023   WBC 19.5* 23.2* 23.8* 32.6*   HGB 9.3* 9.8* 9.8* 11.9   HCT 29.5* 31.1* 31.2* 37.3    171 157 178     BMP  Recent Labs   Lab 02/03/19  0600 02/02/19  0455 02/01/19  0535 01/31/19  1614 01/31/19  1023  01/30/19  1258     --  138  --  138  --  130*   POTASSIUM 3.5 4.1 3.9 3.9 3.3*  --  4.0   CHLORIDE 103  --  104  --   102  --  93*   CO2 29  --  29  --  26  --  25   BUN 33*  --  32*  --  41*  --  49*   CR 0.81 0.82 0.77  --  1.09*   < > 2.26*   *  --  161*  --  139*  --  107*    < > = values in this interval not displayed.     LFT  Recent Labs   Lab 01/30/19  1258   *   ALT 89*   ALKPHOS 163*   BILITOTAL 0.9   ALBUMIN 2.1*     PancreasNo lab results found in last 7 days.  INR  Lab Results   Component Value Date    INR 0.93 10/03/2013       Current Issues:    CNS: Sedated.  - Pain: Acute/chronic. Continue fentanyl for now. Wean if will tolerate. Would not escalate.  - MS: Continue Amitriptyline, baclofen, gabapentin. Resume duloxetine when able.  - Sedation: dexmedetomidine, need to wean to RASS -1    Pulm: Hypoxic respiratory failure with secretions, didn't respond to recruitment maneuver  -Keep current PEEP on vent  -Repeat furosemide.    -lower rate    CV: Septic shock and hypotension.  BP good with vasopressin. .  - Septic shock: stop vasopressin.  It may be contributing to fluid retention (ADH).  - SVT vs atrial flutter: Plan to continue amiodarone gtt for tonight and transition to enteral tomorrow.    FEN/GI: Abdomen benign.  - Protein-calorie malnutrition: Continue tube feedings.   - Morbid obesity affecting cares: BMI 48.  - Wound not treat intertriginous wound cultures    : UOP good, urine clear.  - JULIANNE: Resolved.  - Ureteral stone: s/p stenting; urology following. Will need stone extraction eventually.  - K/Mg protocols.    Heme:   - Anemia: follow for need to transfuse such as insufficient oxygen delivery.    Msk: Extremities warm, well-perfused.   - Intertrigonal rash: Is getting nystatin. Cultures from swab polymicrobial, which is not unexpected. Follow.    Endo:   - DM2: Sliding scale insulin.    ID: Afebrile, WBCs continue to trend down.  - Urinary tract infection  - E coli bacteremia, multiple lactose fermenting GNR, pip/tazo likely to be long term  -> Sensitivities pending, continue pip/tazo and  vancomycin until culture results finalize.    ICU: [facility], .    Evaluation and management time exclusive of procedures was 30 minutes critical care time including:  examination with the ICU team, discussion of the patient's condition with other physicians and members of the care team, reviewing all data related to the patient, and time utilizing the EMR for documentation of this patient's care.      Gigi Alvarez MD  Acute Care Surgery/Critical Care

## 2019-02-03 NOTE — PROGRESS NOTES
[facility]    Hospitalist Progress Note  Name: Amanda Richardson    MRN: [id number]  Provider: Ирина Montemayor MD  Date of Service: 02/03/2019    Assessment & Plan   Summary of Stay: Amanda Richardson is a 53 year old female with past medical history significant for advanced MS, diabetes mellitus, hypertension, history of T-cell lymphoma status post treatment, chronically elevated WBC count, history of recurrent UTIs who presented with altered mental status increased weakness and somnolence.  Evaluation in the emergency room showed significant leukocytosis, hypotension, acute renal failure, obstructing left kidney stone with moderate hydronephrosis and respiratory failure with acidosis with hypercapnia.  She was admitted was admitted on 1/30/2019 with septic shock.  Patient received IV antibiotics and fluid resuscitation in the emergency room and underwent emergent cystoscopy and left retrograde pyelogram with stent placement on 1/30/2019.  CT also showed closed fracture of right femur.  She was initially placed on BiPAP for respiratory failure but perioperatively was intubated. she was admitted to ICU for further evaluation.    1/31/2019 patient developed wide-complex tachycardia appeared to be SVT failed to respond to vasovagal maneuvers.  Adenosine was given which showed rhythm to be atrial flutter she received cardioversion and was started on amiodarone.  Angiotensin II drip was initiated  briefly for hypotension.  She is now off pressors.  Continues to be on Precedex        New Atrial flutter provoked by urosepsis  --Status post cardioversion 1/31/2019  --Appreciate cardiology consult  --Cardiac echo when compared to prior study showed minimal deterioration of left ventricular systolic function.  EF 50-55%  --No anticoagulation for now per cardiology patient was in atrial flutter for less than 8 hours  --Switched to oral amiodarone today for 2 weeks    Septic shock likely due to urinary tract  source (altered mental status, acute renal failure, hypotension, leukocytosis, acidosis, respiratory failure)  --CT scan on admission showed obstructing stone with hydronephrosis and acute renal failure  --Likely secondary to infected kidney stone  --Status post cystoscopy and stent placement 1/30/2019.  Per report patient did have purulent drainage noted during cystoscopy.  --Fluid resuscitation in the emergency room  --IV antibiotic started zosyn to cover for possible E. coli infection.  She also received a azithromycin for possible aspiration pneumonia as noted on CT  --Cultures positive for E coli  --Off pressors today  --Continues to be on Precedex      Hypercapnic respiratory failure  --Likely multifactorial from sepsis and chronic narcotic use  --Was intubated perioperatively  --Increased O2 needs.  Chest x-ray showed congestion was treated with IV diuresis  --IV Lasix 40mg twice today  --Daily weights      Acute renal failure with left hydronephrosis: Resolved now at baseline  --Secondary to obstructive uropathy.  Creatinine on admission 2.25 baseline 1  --Status post a stent placement  --We will monitor renal functions closely  --Avoid nephrotoxins  --Urology consulted and following      Right femoral neck fracture  --Incidental finding on CT  --Patient has MS at baseline is able to pivot herself  --No pain noted on initial exam  --We will consult orthopedic surgery    Type 2 diabetes mellitus  --At baseline on metformin will hold for now  --Sliding scale insulin    MS with associated chronic pain  --Prior to admission on amitriptyline, baclofen, duloxetine, gabapentin and oxycodone  --PRN pain control  --PT evaluation    Significant leukocytosis: Improving  --Patient has history of T-cell non-Hodgkin's lymphoma status post treatment at baseline white cell count is in the mid teens  --Significant initial elevation likely due to sepsis    H/o Hypertension  --Was hypotensive on presentation  --Held losartan  and hydrochlorothiazide    Bilateral inguinal wounds  --On admission concern for suppurative hydradenitis  --Wound culture sent  --1 dose of IV Vanco given for possible MRSA on admission          DVT Prophylaxis: Pneumatic Compression Devices  Code Status: Full Code    Disposition: To be decided intubated      Interval History   Reviewed chart.  Patient sedated and intubated unable to get review of systems  Remains febrile  Heart rate controlled on amiodarone  Has required increased FiO2  We will continue diuresis today    -Data reviewed today: I reviewed all new labs and imaging reports over the last 24 hours. I personally reviewed no images or EKG's today.    Physical Exam   Temp: 100.3  F (37.9  C) Temp src: Axillary BP: 125/51 Pulse: 82 Heart Rate: 83 Resp: 14 SpO2: 96 % O2 Device: Mechanical Ventilator    Vitals:    02/01/19 0315 02/02/19 0000 02/03/19 0000   Weight: 127 kg (279 lb 15.8 oz) 130.5 kg (287 lb 11.2 oz) 130.9 kg (288 lb 9.3 oz)     Vital Signs with Ranges  Temp:  [99.4  F (37.4  C)-100.3  F (37.9  C)] 100.3  F (37.9  C)  Pulse:  [59-89] 82  Heart Rate:  [62-92] 83  Resp:  [12-21] 14  BP: ()/(42-82) 125/51  FiO2 (%):  [70 %-75 %] 70 %  SpO2:  [91 %-96 %] 96 %  I/O last 3 completed shifts:  In: 3553.02 [I.V.:3193.02; NG/GT:360]  Out: 4075 [Urine:4075]      GEN: Intubated and sedated NAD.  HEENT:  Normocephalic/atraumatic, no scleral icterus, no nasal discharge, mouth dry  CV: Tachycardic no murmur or JVD.  S1 + S2 noted, no S3 or S4.  LUNGSleft basilar crackles.  Symmetric chest rise on inhalation noted.  ABD:  Active bowel sounds, soft, non-tender/non-distended.  No rebound/guarding/rigidity.  Bilateral inguinal wounds ?  hidradenitis  EXT: + edema.  No cyanosis.    SKIN:  Dry to touch, chronic stasis dermatitis and bilateral inguinal hidradenitis    Medications     dexmedetomidine 0.4 mcg/kg/hr (02/03/19 0843)     IV fluid REPLACEMENT ONLY       fentaNYL 100 mcg/hr (02/03/19 0843)     Patient  RECEIVING antibiotic to treat a different condition and it provides ADEQUATE COVERAGE for this surgical procedure.       sodium chloride 10 mL/hr at 02/03/19 0843     sodium chloride Stopped (02/03/19 0842)       amiodarone  400 mg Oral Daily     amitriptyline  100 mg Oral or Feeding Tube At Bedtime     aspirin  162 mg Oral or Feeding Tube Daily     atorvastatin  10 mg Oral or Feeding Tube Daily     baclofen  10 mg Oral or Feeding Tube TID     gabapentin  600 mg Oral or Feeding Tube TID     heparin lock flush  5-10 mL Intracatheter Q24H     heparin  5,000 Units Subcutaneous Q8H     insulin aspart  1-6 Units Subcutaneous Q4H     lidocaine VISCOUS  5 mL Topical Once     multivitamins w/minerals  15 mL Per Feeding Tube Daily     piperacillin-tazobactam  3.375 g Intravenous Q6H     rantidine  150 mg Oral or Feeding Tube BID     sodium chloride (PF)  3 mL Intracatheter Q8H     vancomycin (VANCOCIN) IV  2,000 mg Intravenous Q12H     Data     Recent Labs   Lab 01/31/19  1753 01/31/19  0555 01/30/19  2220 01/30/19  1840 01/30/19  1635   PH  --  7.37 7.28* 7.29*  --    PHV 7.39  --   --   --  7.29*   PO2  --  102 108* 75*  --    PO2V 43  --   --   --  57*   PCO2  --  44 55* 52*  --    PCO2V 49  --   --   --  62*   HCO3  --  26 26 25  --    HCO3V 29*  --   --   --  30*     Recent Labs   Lab 02/03/19  0600 02/02/19  0455 02/01/19  0535   WBC 19.5* 23.2* 23.8*   HGB 9.3* 9.8* 9.8*   HCT 29.5* 31.1* 31.2*   MCV 82 82 81    171 157     Recent Labs   Lab 02/03/19  0600 02/02/19  0455 02/01/19  0535  01/31/19  1023     --  138  --  138   POTASSIUM 3.5 4.1 3.9   < > 3.3*   CHLORIDE 103  --  104  --  102   CO2 29  --  29  --  26   ANIONGAP 8  --  5  --  10   *  --  161*  --  139*   BUN 33*  --  32*  --  41*   CR 0.81 0.82 0.77  --  1.09*   GFRESTIMATED 81 81 87  --  57*   GFRESTBLACK >90 >90 >90  --  66   MARY 7.2*  --  7.1*  --  7.6*    < > = values in this interval not displayed.     Recent Labs   Lab  01/31/19  0410 01/30/19  2120 01/30/19  2016 01/30/19  1522 01/30/19  1512   CULT Moderate growth  Normal geovanna   Light growth  Escherichia coli  *  Moderate growth  beta hemolytic   Streptococcus constellatus  *  Moderate growth  Normal skin geovanna    Moderate growth  Normal skin geovanna    Moderate growth  beta hemolytic   Streptococcus constellatus  Susceptibility testing done on previous specimen  * 50,000 to 100,000 colonies/mL  Lactose fermenting gram negative rods  *  10,000 to 50,000 colonies/mL  Strain 2  Lactose fermenting gram negative rods  *  <10,000 colonies/mL  Strain 3  Lactose fermenting gram negative rods  *  <10,000 colonies/mL  Strain 4  Lactose fermenting gram negative rods  *  Susceptibility testing not routinely done Cultured on the 1st day of incubation:  Escherichia coli  Susceptibility testing done on previous specimen  *  Critical Value/Significant Value, preliminary result only, called to and read back by  Beronica Woods  RN @0732 01/31/19 gd   Cultured on the 1st day of incubation:  Escherichia coli  *  Critical Value/Significant Value, preliminary result only, called to and read back by  SHAMAR PEREZ RN @0634 1/31/19. SCG    (Note)  POSITIVE for E.COLI by Verigene multiplex nucleic acid test. Final  identification and antimicrobial susceptibility testing will be  verified by standard methods. Verigene test will not distinguish  E.coli from Shigella species including S.dysenteriae, S.flexneri,  S.boydii, and S.sonnei. Specimens containing Shigella species or  E.coli will be reported as Positive for E.coli.    Specimen tested with Verigene multiplex, gram-negative blood culture  nucleic acid test for the following targets: Acinetobacter sp.,  Citrobacter sp., Enterobacter sp., Proteus sp., E. coli, K.  pneumoniae/oxytoca, P. aeruginosa, and the following resistance  markers: CTXM, KPC, NDM, VIM, IMP and OXA.    Critical Value/Significant Value called to and read back by  MARICARMEN ORTEZ RN [facility] 0844 1.31.19 MJ       Recent Labs   Lab 02/03/19  0600 02/02/19  0455 02/01/19  0535   HGB 9.3* 9.8* 9.8*     Recent Labs   Lab 01/30/19  1258   *   ALT 89*   ALKPHOS 163*   BILITOTAL 0.9     No results for input(s): INR in the last 168 hours.  Recent Labs   Lab 01/30/19  1258   LACT 1.2     Recent Labs   Lab 01/30/19  1448   COLOR Stacy   APPEARANCE Cloudy   URINEGLC Negative   URINEBILI Negative   URINEKETONE Negative   SG 1.018   UBLD Moderate*   URINEPH 5.0   PROTEIN 100*   NITRITE Negative   LEUKEST Large*   RBCU 49*   WBCU 104*       No results found for this or any previous visit (from the past 24 hour(s)).

## 2019-02-03 NOTE — PROGRESS NOTES
Respiratory Therapy Note    Patient seen resting in bed on mechanical ventilator settings:    Ventilation Mode: CMV/AC  (Continuous Mandatory Ventilation/ Assist Control)  FiO2 (%): 65 %  Rate Set (breaths/minute): 14 breaths/min  Tidal Volume Set (mL): 550 mL  PEEP (cm H2O): 10 cmH2O  Oxygen Concentration (%): 65 %  Resp: 18    Respiratory rate 15-20, breath sounds coarse, and SpO2 95%. Suctioning small/moderate, creamy/tan, thick secretions from ETT. Ventilator circuit changed this shift. RT will continue to monitor.    Linette Heck  5:07 PM February 3, 2019

## 2019-02-03 NOTE — PROGRESS NOTES
ICU End of Shift Summary.  For vital signs and complete assessments, please see documentation flowsheets.     Pertinent assessments: Vent 70%. Febrile. Tube feed at 65\hr with 60cc h20 q4hr. Vaso, precedex, fent, amio infusing into CL. PIV x2. Ramos in place, good uo. Restraints in place.  Major Shift Events:  Bath, oral care and ramos care done. Wound care done.  Gauze moistened with sterile water packed into groin wounds, wounds are deep and appear tunnuled, dark red blood oozing from wounds in bilateral groin area. Coccyx wound is covered with foam mepilex. Foam mepilex also in place on L shin. Patient repositioned [id number].     Plan (Upcoming Events): continue with plan of care  Discharge/Transfer Needs: TBD    Bedside Shift Report Completed : yes  Bedside Safety Check Completed: yes

## 2019-02-03 NOTE — PROGRESS NOTES
Ventilation Mode: CMV/AC  (Continuous Mandatory Ventilation/ Assist Control)  FiO2 (%): 70 %  Rate Set (breaths/minute): 18 breaths/min  Tidal Volume Set (mL): 550 mL  PEEP (cm H2O): 10 cmH2O  Oxygen Concentration (%): 70 %  Resp: 12      NO changes made to vent this shift. Patient remains sedated. No weaning done as peep is 10 and FIo2 is at 70%. Will monitor respiratory status and wean as able.

## 2019-02-03 NOTE — PROGRESS NOTES
Right wrist and Left wrist restraints continued 2/3/2019    Clinical Justification: Pulling lines, pulling tubes, and pulling equipment  Less Restrictive Alternative: Repositioning, Re-evaluate equipment, Disguise equipment  Attending Physician Notified: No (comment),     New orders placed Yes  Length of Order: 1 Day      Nilda Greenwood

## 2019-02-04 ENCOUNTER — APPOINTMENT (OUTPATIENT)
Dept: GENERAL RADIOLOGY | Facility: CLINIC | Age: 54
DRG: 853 | End: 2019-02-04
Payer: COMMERCIAL

## 2019-02-04 LAB
ANION GAP SERPL CALCULATED.3IONS-SCNC: 5 MMOL/L (ref 3–14)
ANISOCYTOSIS BLD QL SMEAR: SLIGHT
BASOPHILS # BLD AUTO: 0 10E9/L (ref 0–0.2)
BASOPHILS NFR BLD AUTO: 0 %
BUN SERPL-MCNC: 30 MG/DL (ref 7–30)
CALCIUM SERPL-MCNC: 7.5 MG/DL (ref 8.5–10.1)
CHLORIDE SERPL-SCNC: 103 MMOL/L (ref 94–109)
CO2 SERPL-SCNC: 33 MMOL/L (ref 20–32)
CREAT SERPL-MCNC: 0.8 MG/DL (ref 0.52–1.04)
CRP SERPL-MCNC: 57.7 MG/L (ref 0–8)
DIFFERENTIAL METHOD BLD: ABNORMAL
EOSINOPHIL # BLD AUTO: 0.2 10E9/L (ref 0–0.7)
EOSINOPHIL NFR BLD AUTO: 1 %
ERYTHROCYTE [DISTWIDTH] IN BLOOD BY AUTOMATED COUNT: 17.2 % (ref 10–15)
GFR SERPL CREATININE-BSD FRML MDRD: 82 ML/MIN/{1.73_M2}
GLUCOSE BLDC GLUCOMTR-MCNC: 127 MG/DL (ref 70–99)
GLUCOSE BLDC GLUCOMTR-MCNC: 128 MG/DL (ref 70–99)
GLUCOSE BLDC GLUCOMTR-MCNC: 130 MG/DL (ref 70–99)
GLUCOSE BLDC GLUCOMTR-MCNC: 131 MG/DL (ref 70–99)
GLUCOSE BLDC GLUCOMTR-MCNC: 142 MG/DL (ref 70–99)
GLUCOSE BLDC GLUCOMTR-MCNC: 148 MG/DL (ref 70–99)
GLUCOSE BLDC GLUCOMTR-MCNC: 148 MG/DL (ref 70–99)
GLUCOSE BLDC GLUCOMTR-MCNC: 155 MG/DL (ref 70–99)
GLUCOSE SERPL-MCNC: 129 MG/DL (ref 70–99)
HCT VFR BLD AUTO: 33.7 % (ref 35–47)
HGB BLD-MCNC: 10.3 G/DL (ref 11.7–15.7)
HYPOCHROMIA BLD QL: PRESENT
LYMPHOCYTES # BLD AUTO: 2.8 10E9/L (ref 0.8–5.3)
LYMPHOCYTES NFR BLD AUTO: 13 %
MAGNESIUM SERPL-MCNC: 1.8 MG/DL (ref 1.6–2.3)
MCH RBC QN AUTO: 25.5 PG (ref 26.5–33)
MCHC RBC AUTO-ENTMCNC: 30.6 G/DL (ref 31.5–36.5)
MCV RBC AUTO: 83 FL (ref 78–100)
METAMYELOCYTES # BLD: 0.6 10E9/L
METAMYELOCYTES NFR BLD MANUAL: 3 %
MONOCYTES # BLD AUTO: 1.5 10E9/L (ref 0–1.3)
MONOCYTES NFR BLD AUTO: 7 %
MYELOCYTES # BLD: 1.3 10E9/L
MYELOCYTES NFR BLD MANUAL: 6 %
NEUTROPHILS # BLD AUTO: 15.1 10E9/L (ref 1.6–8.3)
NEUTROPHILS NFR BLD AUTO: 70 %
PHOSPHATE SERPL-MCNC: 3.9 MG/DL (ref 2.5–4.5)
PLATELET # BLD AUTO: 306 10E9/L (ref 150–450)
PLATELET # BLD EST: ABNORMAL 10*3/UL
POTASSIUM SERPL-SCNC: 3.7 MMOL/L (ref 3.4–5.3)
RBC # BLD AUTO: 4.04 10E12/L (ref 3.8–5.2)
SODIUM SERPL-SCNC: 141 MMOL/L (ref 133–144)
VARIANT LYMPHS BLD QL SMEAR: PRESENT
WBC # BLD AUTO: 21.6 10E9/L (ref 4–11)

## 2019-02-04 PROCEDURE — 00000146 ZZHCL STATISTIC GLUCOSE BY METER IP

## 2019-02-04 PROCEDURE — 25000132 ZZH RX MED GY IP 250 OP 250 PS 637: Performed by: INTERNAL MEDICINE

## 2019-02-04 PROCEDURE — 27210437 ZZH NUTRITION PRODUCT SEMIELEM INTERMED LITER

## 2019-02-04 PROCEDURE — 25000128 H RX IP 250 OP 636: Performed by: INTERNAL MEDICINE

## 2019-02-04 PROCEDURE — 85025 COMPLETE CBC W/AUTO DIFF WBC: CPT | Performed by: INTERNAL MEDICINE

## 2019-02-04 PROCEDURE — 94640 AIRWAY INHALATION TREATMENT: CPT

## 2019-02-04 PROCEDURE — 87086 URINE CULTURE/COLONY COUNT: CPT | Performed by: INTERNAL MEDICINE

## 2019-02-04 PROCEDURE — 25000128 H RX IP 250 OP 636: Performed by: SURGERY

## 2019-02-04 PROCEDURE — 25000125 ZZHC RX 250: Performed by: INTERNAL MEDICINE

## 2019-02-04 PROCEDURE — 83735 ASSAY OF MAGNESIUM: CPT | Performed by: INTERNAL MEDICINE

## 2019-02-04 PROCEDURE — 87040 BLOOD CULTURE FOR BACTERIA: CPT | Performed by: INTERNAL MEDICINE

## 2019-02-04 PROCEDURE — 86140 C-REACTIVE PROTEIN: CPT | Performed by: INTERNAL MEDICINE

## 2019-02-04 PROCEDURE — 84100 ASSAY OF PHOSPHORUS: CPT | Performed by: INTERNAL MEDICINE

## 2019-02-04 PROCEDURE — 20000003 ZZH R&B ICU

## 2019-02-04 PROCEDURE — 87077 CULTURE AEROBIC IDENTIFY: CPT | Performed by: INTERNAL MEDICINE

## 2019-02-04 PROCEDURE — 87800 DETECT AGNT MULT DNA DIREC: CPT | Performed by: INTERNAL MEDICINE

## 2019-02-04 PROCEDURE — 94640 AIRWAY INHALATION TREATMENT: CPT | Mod: 76

## 2019-02-04 PROCEDURE — 80048 BASIC METABOLIC PNL TOTAL CA: CPT | Performed by: INTERNAL MEDICINE

## 2019-02-04 PROCEDURE — 99291 CRITICAL CARE FIRST HOUR: CPT | Performed by: INTERNAL MEDICINE

## 2019-02-04 PROCEDURE — 99232 SBSQ HOSP IP/OBS MODERATE 35: CPT | Performed by: INTERNAL MEDICINE

## 2019-02-04 PROCEDURE — 99233 SBSQ HOSP IP/OBS HIGH 50: CPT | Performed by: INTERNAL MEDICINE

## 2019-02-04 PROCEDURE — 87186 SC STD MICRODIL/AGAR DIL: CPT | Performed by: INTERNAL MEDICINE

## 2019-02-04 PROCEDURE — 71045 X-RAY EXAM CHEST 1 VIEW: CPT

## 2019-02-04 PROCEDURE — 25000132 ZZH RX MED GY IP 250 OP 250 PS 637: Performed by: SURGERY

## 2019-02-04 PROCEDURE — 25000125 ZZHC RX 250: Performed by: SURGERY

## 2019-02-04 PROCEDURE — G0463 HOSPITAL OUTPT CLINIC VISIT: HCPCS

## 2019-02-04 PROCEDURE — 94003 VENT MGMT INPAT SUBQ DAY: CPT

## 2019-02-04 PROCEDURE — 40000275 ZZH STATISTIC RCP TIME EA 10 MIN

## 2019-02-04 RX ORDER — OXYCODONE HCL 5 MG/5 ML
5 SOLUTION, ORAL ORAL EVERY 4 HOURS
Status: DISCONTINUED | OUTPATIENT
Start: 2019-02-04 | End: 2019-02-07

## 2019-02-04 RX ORDER — FUROSEMIDE 10 MG/ML
40 INJECTION INTRAMUSCULAR; INTRAVENOUS ONCE
Status: COMPLETED | OUTPATIENT
Start: 2019-02-04 | End: 2019-02-04

## 2019-02-04 RX ORDER — LEVALBUTEROL INHALATION SOLUTION 0.63 MG/3ML
0.63 SOLUTION RESPIRATORY (INHALATION) EVERY 6 HOURS
Status: DISPENSED | OUTPATIENT
Start: 2019-02-04 | End: 2019-02-07

## 2019-02-04 RX ADMIN — Medication 2 G: at 11:11

## 2019-02-04 RX ADMIN — VANCOMYCIN HYDROCHLORIDE 1750 MG: 10 INJECTION, POWDER, LYOPHILIZED, FOR SOLUTION INTRAVENOUS at 12:20

## 2019-02-04 RX ADMIN — MULTIVITAMIN 15 ML: LIQUID ORAL at 08:10

## 2019-02-04 RX ADMIN — HEPARIN SODIUM 5000 UNITS: 5000 INJECTION, SOLUTION INTRAVENOUS; SUBCUTANEOUS at 01:37

## 2019-02-04 RX ADMIN — RANITIDINE HYDROCHLORIDE 150 MG: 15 SOLUTION ORAL at 08:10

## 2019-02-04 RX ADMIN — OXYCODONE HYDROCHLORIDE 5 MG: 5 SOLUTION ORAL at 16:24

## 2019-02-04 RX ADMIN — DEXMEDETOMIDINE 0.6 MCG/KG/HR: 100 INJECTION, SOLUTION, CONCENTRATE INTRAVENOUS at 02:45

## 2019-02-04 RX ADMIN — OXYCODONE HYDROCHLORIDE 5 MG: 5 SOLUTION ORAL at 23:44

## 2019-02-04 RX ADMIN — SODIUM CHLORIDE, PRESERVATIVE FREE 5 ML: 5 INJECTION INTRAVENOUS at 22:39

## 2019-02-04 RX ADMIN — TAZOBACTAM SODIUM AND PIPERACILLIN SODIUM 3.38 G: 375; 3 INJECTION, SOLUTION INTRAVENOUS at 16:20

## 2019-02-04 RX ADMIN — TAZOBACTAM SODIUM AND PIPERACILLIN SODIUM 3.38 G: 375; 3 INJECTION, SOLUTION INTRAVENOUS at 22:20

## 2019-02-04 RX ADMIN — GABAPENTIN 600 MG: 600 TABLET, FILM COATED ORAL at 08:10

## 2019-02-04 RX ADMIN — HEPARIN SODIUM 5000 UNITS: 5000 INJECTION, SOLUTION INTRAVENOUS; SUBCUTANEOUS at 23:44

## 2019-02-04 RX ADMIN — DEXMEDETOMIDINE 0.6 MCG/KG/HR: 100 INJECTION, SOLUTION, CONCENTRATE INTRAVENOUS at 20:20

## 2019-02-04 RX ADMIN — ATORVASTATIN CALCIUM 10 MG: 10 TABLET, FILM COATED ORAL at 08:10

## 2019-02-04 RX ADMIN — AMITRIPTYLINE HYDROCHLORIDE 100 MG: 50 TABLET, FILM COATED ORAL at 22:36

## 2019-02-04 RX ADMIN — AMIODARONE HYDROCHLORIDE 400 MG: 200 TABLET ORAL at 08:10

## 2019-02-04 RX ADMIN — LEVALBUTEROL HYDROCHLORIDE 0.63 MG: 0.63 SOLUTION RESPIRATORY (INHALATION) at 16:38

## 2019-02-04 RX ADMIN — POTASSIUM CHLORIDE 20 MEQ: 1.5 POWDER, FOR SOLUTION ORAL at 08:10

## 2019-02-04 RX ADMIN — OXYCODONE HYDROCHLORIDE 5 MG: 5 SOLUTION ORAL at 11:19

## 2019-02-04 RX ADMIN — GABAPENTIN 600 MG: 600 TABLET, FILM COATED ORAL at 16:23

## 2019-02-04 RX ADMIN — VANCOMYCIN HYDROCHLORIDE 1750 MG: 10 INJECTION, POWDER, LYOPHILIZED, FOR SOLUTION INTRAVENOUS at 00:22

## 2019-02-04 RX ADMIN — ACETAMINOPHEN 650 MG: 325 SOLUTION ORAL at 11:18

## 2019-02-04 RX ADMIN — ACETAMINOPHEN 650 MG: 325 SOLUTION ORAL at 22:40

## 2019-02-04 RX ADMIN — DEXMEDETOMIDINE 0.7 MCG/KG/HR: 100 INJECTION, SOLUTION, CONCENTRATE INTRAVENOUS at 08:57

## 2019-02-04 RX ADMIN — Medication 50 MCG/HR: at 23:44

## 2019-02-04 RX ADMIN — Medication 100 MCG/HR: at 03:06

## 2019-02-04 RX ADMIN — BACLOFEN 10 MG: 10 TABLET ORAL at 08:10

## 2019-02-04 RX ADMIN — LEVALBUTEROL HYDROCHLORIDE 0.63 MG: 0.63 SOLUTION RESPIRATORY (INHALATION) at 19:27

## 2019-02-04 RX ADMIN — BACLOFEN 10 MG: 10 TABLET ORAL at 16:23

## 2019-02-04 RX ADMIN — ASPIRIN 81 MG 162 MG: 81 TABLET ORAL at 08:09

## 2019-02-04 RX ADMIN — FUROSEMIDE 40 MG: 10 INJECTION, SOLUTION INTRAMUSCULAR; INTRAVENOUS at 11:18

## 2019-02-04 RX ADMIN — DEXMEDETOMIDINE 0.7 MCG/KG/HR: 100 INJECTION, SOLUTION, CONCENTRATE INTRAVENOUS at 14:30

## 2019-02-04 RX ADMIN — ACETAMINOPHEN 650 MG: 325 SOLUTION ORAL at 16:25

## 2019-02-04 RX ADMIN — GABAPENTIN 600 MG: 600 TABLET, FILM COATED ORAL at 22:36

## 2019-02-04 RX ADMIN — OXYCODONE HYDROCHLORIDE 5 MG: 5 SOLUTION ORAL at 20:20

## 2019-02-04 RX ADMIN — TAZOBACTAM SODIUM AND PIPERACILLIN SODIUM 3.38 G: 375; 3 INJECTION, SOLUTION INTRAVENOUS at 03:42

## 2019-02-04 RX ADMIN — RANITIDINE HYDROCHLORIDE 150 MG: 15 SOLUTION ORAL at 20:20

## 2019-02-04 RX ADMIN — HEPARIN SODIUM 5000 UNITS: 5000 INJECTION, SOLUTION INTRAVENOUS; SUBCUTANEOUS at 16:21

## 2019-02-04 RX ADMIN — TAZOBACTAM SODIUM AND PIPERACILLIN SODIUM 3.38 G: 375; 3 INJECTION, SOLUTION INTRAVENOUS at 10:04

## 2019-02-04 RX ADMIN — BACLOFEN 10 MG: 10 TABLET ORAL at 22:36

## 2019-02-04 RX ADMIN — HEPARIN SODIUM 5000 UNITS: 5000 INJECTION, SOLUTION INTRAVENOUS; SUBCUTANEOUS at 07:57

## 2019-02-04 RX ADMIN — SODIUM CHLORIDE, PRESERVATIVE FREE 5 ML: 5 INJECTION INTRAVENOUS at 10:06

## 2019-02-04 ASSESSMENT — ACTIVITIES OF DAILY LIVING (ADL)
ADLS_ACUITY_SCORE: 32

## 2019-02-04 ASSESSMENT — MIFFLIN-ST. JEOR: SCORE: 1843

## 2019-02-04 NOTE — PROGRESS NOTES
"[facility] Nurse Inpatient Wound Assessment     Assessment of wound(s) on pt's:   Bilateral groin folds        Data:   Patient History:      per MD note(s):  53 year old woman with advanced MS (able to pivot) who resides at home with her .  She was brought to the ED by EMS due to weakness and apparent increased somnolence. He recognized that she probably had a UTI, \"because this is how she gets with those\".    See MD note for complete history.      Moisture Management:  Urinary Catheter    Catheter secured? Yes    Current Diet / Nutrition:     Orders Placed This Encounter      NPO per Anesthesia Guidelines for Procedure/Surgery Except for: Meds              Issac Assessment and sub scores:   Issac Risk Assessment    Sensory Perception: 1-->completely limited    Moisture: 3-->occasionally moist   Activity: 1-->bedfast     Mobility: 2-->very limited   Nutrition: 3-->adequate     Issac Score: 12     Mattress:  Standard , Evolve  Labs:     Recent Labs   Lab Test 02/04/19  0545  01/30/19  1258  10/03/13  1614   ALBUMIN  --   --  2.1*   < > 3.6*   HGB 10.3*   < > 12.9   < > 12.7   INR  --   --   --   --  0.93   WBC 21.6*   < > 47.7*   < > 14.4*   A1C  --   --  6.4*   < >  --    CRP 57.7*  --   --    < >  --     < > = values in this interval not displayed.        Wound Assessment (location ):   Bilateral groin skin folds  Wound History:  Suspected fungal infection with Hydradenitis, skin bridges no tunneling noted    Specific Dimensions (length x width x depth, in cm):       Right Groin: entire area: 8x0.5x 0.1cm deep red wound base, slight amount bloody drainage,     Left Groin: 13 x 0.8 x 0.2cm with multiple skin bridges consistent with chronic healing and reopening wound base, small bloody drainage, wound base moist pink/ red dermis    Periwound Skin: erythema, improved skin rash; suspected fungal infection,      Odor: mild    Pain:  unable to assess , pt intubated    Pressure Wound " Assessment:   Bilateral buttocks- flashy area    Wound History:   See above, new consult    Specific Dimensions (length x width x depth, in cm) :   2x2cm intact maroon/ deep red erythema slow blanching to periwound and inner non blanching.     Right buttocks: very diffuse scattered spots of blanching erythema    Periwound Skin: intact,      Color: normal and consistent with surrounding tissue    Temperature  Pt has fevers    Drainage:  none  Odor: none    Pain:  unable to assess , no wincing    Unsure if related to pressure since the area is not directly over a bony prominence, skin issue appears to be resolving with current use of Mepilex dressing and discussion with RN about order for Air bed.           Intervention:     Patient's chart evaluated.      Wound(s) was assessed    Wound Care: was done:  Per POC    Orders  Written and updated    Supplies  ordered: per POC and discussed with RN    Discussed plan of care with Nurse          Assessment:       Suspect fungal infection with hydradenitis infection, chronic wounds with multiple skin bridges prove chronicity but not tunneling wounds. Continue with Triad and stoma powder and please DO NOT use any Moist gauze or excessive paste.  Bilateral buttocks noted small area of non to slow blanching deep red/ maroon area but not over bony prominence. Will order mepilex and monitor progression closely .        Plan:     Nursing to notify the Provider(s) and re-consult the [facility] Nurse if wound(s) deteriorate(s) or if the wound care plan needs reevaluation.    Plan of care for wound located on bilateral groin: BID    1. Wash wounds with wound spray and pat dry    2. Wash periwound with Tereza and soft cloth    3. Lightly dust the OPEN wounds with Stoma Powder    4. Lightly dust the periwound and groin folds with Antifungal powder and rub briskly into skin    5. Apply LIGHT layer of Triad paste to OPEN wounds and cover with ABD    6. DO NOT USE MOIST GAUZE to wounds        Wound  care to Buttocks: Every 3 days    1. Wipe skin clean and dry, apply Mepilex and date    2. Pulsate or Air bed: turn side to side, avoid pressure points, heel lift boots, Sween to dry skin    WOC Nurse will return: weekly and PRN

## 2019-02-04 NOTE — PROGRESS NOTES
Cardiology Progress Note  BAYLEE Ballard          Assessment and Plan:   Admit ( 1/30)  W/ worsening leg pain, AMS.  Evidence of R femoral fracture and left obstructing nephrolithiasis.  Developed urosepsis and Aflutter w/ RVR shortly after ureteral stent placement.    PMH:  HTN, DM, advanced MS, previous T-cell lymphoma, chronic back pain, frequent UTIs, chronic leukocytosis    NEW Paroxsymal Atrial Flutter w/ RVR  -likely provoked by urosepsis  -Less than 8 hrs  s/p spontaneous conversion (1/31)   -Remains in SR.    -transitioned IV Amiodarone to 400mg PO daily (2/3) with plan to continue for 2 wks.  -currently on heparin  -elevated CHADS2-vasc score.  If recurrent PAF, longterm AC recommended  -ASA 162mg    Urosepsis:  -L obstructing nephrolithiasis w/ hydronephrosis  S/p urgent ureteral stenting (1/30)  -still w/ fevers/intubated.    -resolved acute renal failure  -off pressors    HTN:  -BP elevated this am  -previous Losartan/HCT/ Toprol held  -consider adding back low dose Metoprolol today    Resolving Mild HFpEF  -LVEF 50-55%, NL RVF, dilated IVC  -likely due to aggressive IVFs for sepsis  -IV lasix past 2 days  -(weight down 10lbs overnight)  -(-6100 OP overnight)  (+3900 total OP)      Treated Mixed Hyperlipidemia:  -recent LDL 82    Closed R Femoral Fracture  -ortho to see today    Advanced MS               Interval History:   Sedated/intubated.  Suction copious amounts of secretions                Medications:       [START ON 2/10/2019] amiodarone  200 mg Oral Daily     amiodarone  400 mg Oral Daily     amitriptyline  100 mg Oral or Feeding Tube At Bedtime     aspirin  162 mg Oral or Feeding Tube Daily     atorvastatin  10 mg Oral or Feeding Tube Daily     baclofen  10 mg Oral or Feeding Tube TID     gabapentin  600 mg Oral or Feeding Tube TID     heparin lock flush  5-10 mL Intracatheter Q24H     heparin  5,000 Units Subcutaneous Q8H     insulin aspart  1-6 Units Subcutaneous Q4H     lidocaine VISCOUS   "5 mL Topical Once     multivitamins w/minerals  15 mL Per Feeding Tube Daily     piperacillin-tazobactam  3.375 g Intravenous Q6H     rantidine  150 mg Oral or Feeding Tube BID     sodium chloride (PF)  3 mL Intracatheter Q8H     vancomycin (VANCOCIN) IV  1,750 mg Intravenous Q12H            Physical Exam:   Blood pressure 164/65, pulse 95, temperature 101.2  F (38.4  C), temperature source Axillary, resp. rate 15, height 1.626 m (5' 4\"), weight 126.3 kg (278 lb 7.1 oz), last menstrual period 12/11/2011, SpO2 96 %, not currently breastfeeding.  Wt Readings from Last 3 Encounters:   02/04/19 126.3 kg (278 lb 7.1 oz)   09/19/18 111.1 kg (245 lb)   07/16/18 117.9 kg (260 lb)     I/O last 3 completed shifts:  In: 2846.64 [I.V.:2366.64; NG/GT:480]  Out: 9000 [Urine:9000]    CONST:  Sedated/intubated  LUNGS:  Coarse BS bases bilat  CARDIO:  RRR, S1, S2  No murmurs  ABD:  Obese    EXT:  Generalized edema  1-2+ bilat RITA w/ chronic skin changes noted           Data:   TELE:  SR w/ first degree AV block    CBC  Recent Labs   Lab 02/04/19  0545 02/03/19  0600   WBC 21.6* 19.5*   HGB 10.3* 9.3*    209       BMP  Recent Labs   Lab 02/04/19  0545 02/03/19  1249 02/03/19  0600 02/02/19  0455 02/01/19  0535  01/31/19  1023     --  140  --  138  --  138   POTASSIUM 3.7 3.6 3.5 4.1 3.9   < > 3.3*   CHLORIDE 103  --  103  --  104  --  102   MARY 7.5*  --  7.2*  --  7.1*  --  7.6*   CO2 33*  --  29  --  29  --  26   BUN 30  --  33*  --  32*  --  41*   CR 0.80  --  0.81 0.82 0.77  --  1.09*   *  --  148*  --  161*  --  139*    < > = values in this interval not displayed.     Recent Labs   Lab Test 02/02/19  0455 06/20/18  1529 05/02/16  1613 12/08/14  1756 11/13/12  0957   CHOL  --  141 169 172 205*   HDL  --  33* 35* 36* 32*   LDL  --  82 107* 97 140*   TRIG 292* 130 135 197* 167*   CHOLHDLRATIO  --   --   --  4.8 6.5*       TROP  Lab Results   Component Value Date    TROPI 0.036 02/01/2019    TROPI 0.032 02/01/2019 "    TROPI 0.057 (H) 01/31/2019       BNP  No results for input(s): NTBNPI, NTBNP in the last 168 hours.

## 2019-02-04 NOTE — CONSULTS
[facility]  Antimicrobial Stewardship Team (AST) Note            To: Hospitalist  Unit:   Patient Name: Donita Richardson    Allergies: No antibiotic allergies          Brief Summary:  Patient is a 53 year old female with past medical history significant for advanced MS, diabetes mellitus, hypertension, history of T-cell lymphoma status post treatment, chronically elevated WBC count, history of recurrent UTIs who presented with altered mental status increased weakness and somnolence.  Evaluation in the emergency room showed significant leukocytosis, hypotension, acute renal failure, obstructing left kidney stone with moderate hydronephrosis and respiratory failure with acidosis with hypercapnia.  She was admitted was admitted on 1/30/2019 with septic shock.  Patient received IV antibiotics and fluid resuscitation in the emergency room and underwent emergent cystoscopy and left retrograde pyelogram with stent placement on 1/30/2019.  CT also showed possible pneumonia.  She was initially placed on BiPAP for respiratory failure but perioperatively was intubated. she was admitted to ICU for further evaluation. Patient is currently on Zosyn (Day 5) & IV Vancomycin (Day 5). She also received a course of azithromycin     Assessment: Tmax 101.4, WBC 21.6 (?from 47.7 on admit), cultures are negative for MRSA/DRG(+) organsims  Current Antibiotic therapy: Zosyn 3.375 grams IV q6hrs (1/30@~2200 - Day 6), Vancomycin 1750 mg IV q12hr (1/30@2350 - Day 6)    Clinical Features/Vital Signs (VS):              Culture Results:  Date Culture Site Organism   1/30 Blood x 2  (RH & LH) Cultured on the 1st day of incubation - E.coli   1/30 Cath urine 50-100k/ml lactose fermenting gram (-) rods    10-50 k/mL strain 2 lactose fermenting gram (-) rods    < 10 k/ml strain 3 lactose fermenting gram (-) rods    < 10 k/mL strain 4 lactose fermenting gram (-) rods       1/30 Wound - Groin (left) Moderate growth beta hemolytic  Strep constellatus  Light growth E. coli   1/30 Wound - Groin (right) Moderate growth Strep constellatus   1/31 Sputum Moderate growth normal geovanna         Imaging:        Recommendation/Interventions:    Cultures are negative for MRSA/DRG (+) organisms. May wish to consider discontinuing vancomycin.     Discussed w/ ID Staff-Dr Brittni Aragon, PharmD, [facility]  Pharmacy Clinical Specialist  [phone]

## 2019-02-04 NOTE — PLAN OF CARE
soft restraints restraints continued 2/4/2019    Clinical Justification: Pulling lines, pulling tubes, and pulling equipment  Less Restrictive Alternative: 1:1 patient care, Repositioning, Re-evaluate equipment, Disguise equipment, Pain management, Alarm, De-escalation, Reorientation  Attending Physician Notified: No (comment),     New orders placed Yes  Length of Order: 1 Day   ICU End of Shift Summary.  For vital signs and complete assessments, please see documentation flowsheets.     Pertinent assessments: Patient remains sedated and intubated. Patient moving more today, not following commands. Remains febrile (MD's aware) Tolerating tube feeds. Continues to have large amounts of respiratory secretions. Good urine output. WOC following for wounds  Major Shift Events: lasix given. Fentanyl decreased. Able to decrease fio2.  Plan (Upcoming Events): continue to monitor  Discharge/Transfer Needs: to be determined    Bedside Shift Report Completed : yes  Bedside Safety Check Completed:yes           Marlena Vides

## 2019-02-04 NOTE — PROGRESS NOTES
[facility]    Hospitalist Progress Note  Name: Amanda Richardson    MRN: [id number]  Provider: Ирина Montemayor MD  Date of Service: 02/04/2019    Assessment & Plan   Summary of Stay: Amanda Richardson is a 53 year old female with past medical history significant for advanced MS, diabetes mellitus, hypertension, history of T-cell lymphoma status post treatment, chronically elevated WBC count, history of recurrent UTIs who presented with altered mental status increased weakness and somnolence.  Evaluation in the emergency room showed significant leukocytosis, hypotension, acute renal failure, obstructing left kidney stone with moderate hydronephrosis and respiratory failure with acidosis with hypercapnia.  She was admitted was admitted on 1/30/2019 with septic shock.  Patient received IV antibiotics and fluid resuscitation in the emergency room and underwent emergent cystoscopy and left retrograde pyelogram with stent placement on 1/30/2019.  CT also showed closed fracture of right femur.  She was initially placed on BiPAP for respiratory failure but perioperatively was intubated. she was admitted to ICU for further evaluation.    1/31/2019 patient developed wide-complex tachycardia appeared to be SVT failed to respond to vasovagal maneuvers.  Adenosine was given which showed rhythm to be atrial flutter she received cardioversion and was started on amiodarone.  Angiotensin II drip was initiated  briefly for hypotension.  She is now off pressors.  Continues to be on Precedex.    2/4 2019 has been on antibiotics for 5 days and still had a temp spike of 101.2 slightly worsening leukocytosis        Septic shock likely due to urinary tract source (altered mental status, acute renal failure, hypotension, leukocytosis, acidosis, respiratory failure)  --CT scan on admission showed obstructing stone with hydronephrosis and acute renal failure  --Likely secondary to infected kidney stone  --Status post  cystoscopy and stent placement 1/30/2019.  Per report patient did have purulent drainage noted during cystoscopy.  --Fluid resuscitation in the emergency room  --IV antibiotic started zosyn to cover for possible E. coli infection.  She also received a azithromycin for possible aspiration pneumonia as noted on CT  --Cultures positive for E coli  --Off pressors today  --Continues to be on Precedex.  --Continue to diurese today  --Remains intubated      Hypercapnic respiratory failure  --Likely multifactorial from sepsis and chronic narcotic use  --Was intubated perioperatively  --Increased O2 needs.  Chest x-ray showed congestion was treated with IV diuresis  --Additional IV  Lasix 40mg  today  --Daily weights      Acute renal failure with left hydronephrosis: Resolved now at baseline  --Secondary to obstructive uropathy.  Creatinine on admission 2.25 baseline 1  --Status post a stent placement  --We will monitor renal functions closely  --Avoid nephrotoxins  --Urology consulted and following    New Atrial flutter provoked by urosepsis  --Status post cardioversion 1/31/2019  --Appreciate cardiology consult  --Cardiac echo when compared to prior study showed minimal deterioration of left ventricular systolic function.  EF 50-55%  --No anticoagulation for now per cardiology patient was in atrial flutter for less than 8 hours  --Switched to oral amiodarone today for 2 weeks      Right femoral neck fracture  --Incidental finding on CT  --Patient has MS at baseline is able to pivot herself  --No pain noted on initial exam  --We will consult orthopedic surgery    Type 2 diabetes mellitus  --At baseline on metformin will hold for now  --Sliding scale insulin    MS with associated chronic pain  --Prior to admission on amitriptyline, baclofen, duloxetine, gabapentin and oxycodone  --PRN pain control  --PT evaluation    Significant leukocytosis: Improving  --Patient has history of T-cell non-Hodgkin's lymphoma status post  treatment at baseline white cell count is in the mid teens  --Significant initial elevation likely due to sepsis    H/o Hypertension  --Was hypotensive on presentation  --Held losartan and hydrochlorothiazide    Bilateral inguinal wounds  --On admission concern for suppurative hydradenitis  --Wound culture sent  --1 dose of IV Vanco given for possible MRSA on admission          DVT Prophylaxis: Pneumatic Compression Devices  Code Status: Full Code    Disposition: To be decided intubated      Interval History   Reviewed chart.  Patient sedated and intubated unable to get review of systems  Remains febrile  Heart rate controlled on amiodarone  We will continue diuresis today    -Data reviewed today: I reviewed all new labs and imaging reports over the last 24 hours. I personally reviewed no images or EKG's today.    Physical Exam   Temp: 101.2  F (38.4  C) Temp src: Axillary BP: 164/65 Pulse: 95 Heart Rate: 88 Resp: 15 SpO2: 96 % O2 Device: Mechanical Ventilator    Vitals:    02/02/19 0000 02/03/19 0000 02/04/19 0400   Weight: 130.5 kg (287 lb 11.2 oz) 130.9 kg (288 lb 9.3 oz) 126.3 kg (278 lb 7.1 oz)     Vital Signs with Ranges  Temp:  [100  F (37.8  C)-101.2  F (38.4  C)] 101.2  F (38.4  C)  Pulse:  [71-95] 95  Heart Rate:  [73-92] 88  Resp:  [11-25] 15  BP: (109-164)/(46-72) 164/65  FiO2 (%):  [65 %-70 %] 65 %  SpO2:  [93 %-97 %] 96 %  I/O last 3 completed shifts:  In: 2846.64 [I.V.:2366.64; NG/GT:480]  Out: 9000 [Urine:9000]      GEN: Intubated and sedated NAD.  HEENT:  Normocephalic/atraumatic, no scleral icterus, no nasal discharge, mouth dry  CV: Tachycardic no murmur or JVD.  S1 + S2 noted, no S3 or S4.  LUNGSleft basilar crackles.  Symmetric chest rise on inhalation noted.  ABD:  Active bowel sounds, soft, non-tender/non-distended.  No rebound/guarding/rigidity.  Bilateral inguinal wounds ?  hidradenitis  EXT: + edema.  No cyanosis.    SKIN:  Dry to touch, chronic stasis dermatitis and bilateral inguinal  hidradenitis    Medications     dexmedetomidine 0.6 mcg/kg/hr (02/04/19 0600)     IV fluid REPLACEMENT ONLY       fentaNYL 100 mcg/hr (02/04/19 0600)     Patient RECEIVING antibiotic to treat a different condition and it provides ADEQUATE COVERAGE for this surgical procedure.       sodium chloride 10 mL/hr at 02/03/19 1800     sodium chloride 10 mL/hr at 02/03/19 1800       [START ON 2/10/2019] amiodarone  200 mg Oral Daily     amiodarone  400 mg Oral Daily     amitriptyline  100 mg Oral or Feeding Tube At Bedtime     aspirin  162 mg Oral or Feeding Tube Daily     atorvastatin  10 mg Oral or Feeding Tube Daily     baclofen  10 mg Oral or Feeding Tube TID     gabapentin  600 mg Oral or Feeding Tube TID     heparin lock flush  5-10 mL Intracatheter Q24H     heparin  5,000 Units Subcutaneous Q8H     insulin aspart  1-6 Units Subcutaneous Q4H     lidocaine VISCOUS  5 mL Topical Once     multivitamins w/minerals  15 mL Per Feeding Tube Daily     piperacillin-tazobactam  3.375 g Intravenous Q6H     rantidine  150 mg Oral or Feeding Tube BID     sodium chloride (PF)  3 mL Intracatheter Q8H     vancomycin (VANCOCIN) IV  1,750 mg Intravenous Q12H     Data     Recent Labs   Lab 01/31/19  1753 01/31/19  0555 01/30/19  2220 01/30/19  1840 01/30/19  1635   PH  --  7.37 7.28* 7.29*  --    PHV 7.39  --   --   --  7.29*   PO2  --  102 108* 75*  --    PO2V 43  --   --   --  57*   PCO2  --  44 55* 52*  --    PCO2V 49  --   --   --  62*   HCO3  --  26 26 25  --    HCO3V 29*  --   --   --  30*     Recent Labs   Lab 02/04/19  0545 02/03/19  0600 02/02/19  0455   WBC 21.6* 19.5* 23.2*   HGB 10.3* 9.3* 9.8*   HCT 33.7* 29.5* 31.1*   MCV 83 82 82    209 171     Recent Labs   Lab 02/04/19  0545 02/03/19  1249 02/03/19  0600 02/02/19  0455 02/01/19  0535     --  140  --  138   POTASSIUM 3.7 3.6 3.5 4.1 3.9   CHLORIDE 103  --  103  --  104   CO2 33*  --  29  --  29   ANIONGAP 5  --  8  --  5   *  --  148*  --  161*   BUN  30  --  33*  --  32*   CR 0.80  --  0.81 0.82 0.77   GFRESTIMATED 82  --  81 81 87   GFRESTBLACK >90  --  >90 >90 >90   MARY 7.5*  --  7.2*  --  7.1*     Recent Labs   Lab 01/31/19  0410 01/30/19  2120 01/30/19 2016 01/30/19  1522 01/30/19  1512   CULT Moderate growth  Normal geovanna   Light growth  Escherichia coli  *  Moderate growth  beta hemolytic   Streptococcus constellatus  *  Moderate growth  Normal skin geovanna    Moderate growth  Normal skin geovanna    Moderate growth  beta hemolytic   Streptococcus constellatus  Susceptibility testing done on previous specimen  * 50,000 to 100,000 colonies/mL  Lactose fermenting gram negative rods  *  10,000 to 50,000 colonies/mL  Strain 2  Lactose fermenting gram negative rods  *  <10,000 colonies/mL  Strain 3  Lactose fermenting gram negative rods  *  <10,000 colonies/mL  Strain 4  Lactose fermenting gram negative rods  *  Susceptibility testing not routinely done Cultured on the 1st day of incubation:  Escherichia coli  Susceptibility testing done on previous specimen  *  Critical Value/Significant Value, preliminary result only, called to and read back by  Beronica Woods  RN @0732 01/31/19 gd   Cultured on the 1st day of incubation:  Escherichia coli  *  Critical Value/Significant Value, preliminary result only, called to and read back by  SHAMAR PEREZ RN @0634 1/31/19. SCG    (Note)  POSITIVE for E.COLI by Verigene multiplex nucleic acid test. Final  identification and antimicrobial susceptibility testing will be  verified by standard methods. Verigene test will not distinguish  E.coli from Shigella species including S.dysenteriae, S.flexneri,  S.boydii, and S.sonnei. Specimens containing Shigella species or  E.coli will be reported as Positive for E.coli.    Specimen tested with Verigene multiplex, gram-negative blood culture  nucleic acid test for the following targets: Acinetobacter sp.,  Citrobacter sp., Enterobacter sp., Proteus sp., E. coli,  K.  pneumoniae/oxytoca, P. aeruginosa, and the following resistance  markers: CTXM, KPC, NDM, VIM, IMP and OXA.    Critical Value/Significant Value called to and read back by MARICARMEN ORTEZ RN [facility] [id number] 1.31.19 MJ       Recent Labs   Lab 02/04/19  0545 02/03/19  0600 02/02/19  0455   HGB 10.3* 9.3* 9.8*     Recent Labs   Lab 01/30/19  1258   *   ALT 89*   ALKPHOS 163*   BILITOTAL 0.9     No results for input(s): INR in the last 168 hours.  Recent Labs   Lab 01/30/19  1258   LACT 1.2     Recent Labs   Lab 01/30/19  1448   COLOR Stacy   APPEARANCE Cloudy   URINEGLC Negative   URINEBILI Negative   URINEKETONE Negative   SG 1.018   UBLD Moderate*   URINEPH 5.0   PROTEIN 100*   NITRITE Negative   LEUKEST Large*   RBCU 49*   WBCU 104*       Recent Results (from the past 24 hour(s))   XR Chest Port 1 View    Narrative    CHEST ONE VIEW PORTABLE   2/4/2019 5:47 AM     HISTORY:  Follow up pleural effusions.    COMPARISON: 2/2/2019      Impression    IMPRESSION: Endotracheal tube in good position. Feeding tube passes  into the abdomen, and nasogastric tube has been removed. Central  venous catheter from right internal jugular approach in the superior  vena cava. Decrease in bilateral pleural effusions. Mild persistent  bibasilar infiltrates, improved. Borderline cardiomegaly.

## 2019-02-04 NOTE — PHARMACY-VANCOMYCIN DOSING SERVICE
Pharmacy Vancomycin Note  Date of Service February 3, 2019  Patient's  1965   53 year old, female    Indication: Sepsis; SSTI  Goal Trough Level: 15-20 mg/L  Day of Therapy: 5  Current Vancomycin regimen:  2000 mg IV q12h    Current estimated CrCl = Estimated Creatinine Clearance: 105.6 mL/min (based on SCr of 0.81 mg/dL).    Creatinine for last 3 days  2019:  5:35 AM Creatinine 0.77 mg/dL  2019:  4:55 AM Creatinine 0.82 mg/dL  2/3/2019:  6:00 AM Creatinine 0.81 mg/dL    Recent Vancomycin Levels (past 3 days)  2019: 10:40 AM Vancomycin Level 20.9 mg/L  2/3/2019: 10:53 PM Vancomycin Level 24.7 mg/L    Vancomycin IV Administrations (past 72 hours)                   vancomycin (VANCOCIN) 2,000 mg in sodium chloride 0.9 % 500 mL intermittent infusion (mg) 2,000 mg Given 19 1221     2,000 mg Given  0017     2,000 mg Given 19 1143     2,000 mg Given  0008                Nephrotoxins and other renal medications (From now, onward)    Start     Dose/Rate Route Frequency Ordered Stop    19 0000  vancomycin (VANCOCIN) 1,750 mg in sodium chloride 0.9 % 500 mL intermittent infusion      1,750 mg  over 2 Hours Intravenous EVERY 12 HOURS 19 2357      19 2130  piperacillin-tazobactam (ZOSYN) infusion 3.375 g     Comments:  Pharmacy can adjust dose based on renal function.    3.375 g  100 mL/hr over 30 Minutes Intravenous EVERY 6 HOURS 19 2043               Contrast Orders - past 72 hours (72h ago, onward)    Start     Dose/Rate Route Frequency Ordered Stop    19 0915  perflutren diluted 1mL to 2mL with saline (OPTISON) diluted injection 3 mL     Comments:  NDC# 8377-8780-75    3 mL Intravenous ONCE 19 0906 19 0915          Interpretation of levels and current regimen:  Trough level is  Supratherapeutic    Has serum creatinine changed > 50% in last 72 hours: No    Urine output:  good urine output    Renal Function: Stable    Plan:  1.  Decrease Dose to 1750mg  Q12H  2.  Pharmacy will check trough levels as appropriate in 1-3 Days.    3. Serum creatinine levels will be ordered daily for the first week of therapy and at least twice weekly for subsequent weeks.      Oscar Bennett RPh        .

## 2019-02-04 NOTE — PROGRESS NOTES
"Intensivist note                Assessment and Plan  1. Acute respiratory failure/ALI, and currently on 60% and +10 PEEP, due to septic shock and possible volume overload. She has been diuresing well over the past several days with some improvement in oxygenation. Her PIP's were in high 20's and IPP's about 20, and will add bronchodilators (Xopenex).  2. Recent episode of Vtach- currently on amiodarone, likely due to acute physiologic stress and will complete a 2 week course of therapy.   3. Septic shock, now finally resolving; off pressors; likely due to E. Coli from urine/obstructed ureter  4. Obstructed left ureter, and now with stent in.   5. DM- glucoses ok  6. MS- barely ambulatory at her best  7. Right femoral neck fracture- discovered this admission incidentally  8. Acute Kidney injury- currently 0.8 with known baseline about 0.5. She is tolerating diuresis and will monitor expectantly   9. HTN  10. Dyslipidemia   11. Morbid obesity  12. History of T cell lymphoma treated in 2011  Overall, she remains critical. I spent 35 minutes of critical care time with her.     Physical exam  Well developed female NAD; comfortable on vent  /69   Pulse 87   Temp 101.4  F (38.6  C) (Axillary)   Resp 17   Ht 1.626 m (5' 4\")   Wt 126.3 kg (278 lb 7.1 oz)   LMP 12/11/2011   SpO2 92%   BMI 47.79 kg/m    Lungs with moderate bilateral rhonchi  Heart is RRR  Abdomen is soft and non-tender  Extremities are warm with moderate edema  Skin shows chronic stasis changes on legs; no cyanosis or mottling  CNS- able to follow some commands; moves all extremities weakly    Labs reviewed    rufino strauss  February 4, 2019                            "

## 2019-02-04 NOTE — PROGRESS NOTES
RT- patient remains intubated with ETT secure and patent. ETT suctioned for large, thick, creamy/yellow secretions. Breath sounds coarse throughout. Xopenex nebulizer started in line per order. Current vent settings:    Ventilation Mode: CMV/AC  (Continuous Mandatory Ventilation/ Assist Control)  FiO2 (%): 55 %  Rate Set (breaths/minute): 14 breaths/min  Tidal Volume Set (mL): 550 mL  PEEP (cm H2O): 10 cmH2O  Oxygen Concentration (%): 55 %  Resp: 19    BVM with peep valve at the bedside.     Will continue to monitor patient.    Justina Davidson, RT  2/4/2019 5:33 PM

## 2019-02-04 NOTE — PROGRESS NOTES
ICU End of Shift Summary.  For vital signs and complete assessments, please see documentation flowsheets.     Pertinent assessments: Vent 65%. Febrile. Tube feed at 65\hr with 60cc h20 q4hr. Precedex, fent,  into CL. PIV x2. Ramos in place, good uo. Restraints in place.    Major Shift Events: Bath, oral care and ramos care done. Wound care done.  Gauze moistened with sterile water packed into groin wounds, wounds are deep and appear tunnuled, dark red blood oozing from wounds in bilateral groin area. Coccyx wound is covered with foam mepilex. Foam mepilex also in place on L shin. Patient repositioned [id number].     Plan (Upcoming Events): WOC reevaulate wounds  Discharge/Transfer Needs: TBD    Bedside Shift Report Completed : yes  Bedside Safety Check Completed: yes

## 2019-02-04 NOTE — PROGRESS NOTES
Respiratory Therapy  Patient remains intubated on mechanical ventilator with the following settings:    Ventilation Mode: CMV/AC  (Continuous Mandatory Ventilation/ Assist Control)  FiO2 (%): 65 %  Rate Set (breaths/minute): 14 breaths/min  Tidal Volume Set (mL): 550 mL  PEEP (cm H2O): 10 cmH2O  Oxygen Concentration (%): 65 %  Resp: 18    Temp: 101.2  F (38.4  C) Temp src: Axillary BP: 153/72 Pulse: 93 Heart Rate: 92 Resp: 18 SpO2: 94 % O2 Device: Mechanical Ventilator      BS coarse. Suctioned large amounts of thick, yellow/creamy secretions through the ETT. Will continue to monitor and support the patient's respiratory needs.    Ty Hinojosa, RT  2/4/2019 5:36 AM

## 2019-02-04 NOTE — PROGRESS NOTES
Right wrist and Left wrist restraints continued 2/4/2019    Clinical Justification: Pulling lines, pulling tubes, and pulling equipment  Less Restrictive Alternative: 1:1 patient care, Repositioning, Re-evaluate equipment  Attending Physician Notified: No (comment),     New orders placed Yes  Length of Order: 1 Day      Nilda Greenwood

## 2019-02-05 LAB
ANION GAP SERPL CALCULATED.3IONS-SCNC: 5 MMOL/L (ref 3–14)
ANISOCYTOSIS BLD QL SMEAR: SLIGHT
BACTERIA SPEC CULT: NO GROWTH
BASOPHILS # BLD AUTO: 0.4 10E9/L (ref 0–0.2)
BASOPHILS NFR BLD AUTO: 2 %
BUN SERPL-MCNC: 32 MG/DL (ref 7–30)
CALCIUM SERPL-MCNC: 7.3 MG/DL (ref 8.5–10.1)
CHLORIDE SERPL-SCNC: 105 MMOL/L (ref 94–109)
CO2 SERPL-SCNC: 34 MMOL/L (ref 20–32)
CREAT SERPL-MCNC: 0.9 MG/DL (ref 0.52–1.04)
DIFFERENTIAL METHOD BLD: ABNORMAL
EOSINOPHIL # BLD AUTO: 0.2 10E9/L (ref 0–0.7)
EOSINOPHIL NFR BLD AUTO: 1 %
ERYTHROCYTE [DISTWIDTH] IN BLOOD BY AUTOMATED COUNT: 17.5 % (ref 10–15)
GFR SERPL CREATININE-BSD FRML MDRD: 71 ML/MIN/{1.73_M2}
GLUCOSE BLDC GLUCOMTR-MCNC: 118 MG/DL (ref 70–99)
GLUCOSE BLDC GLUCOMTR-MCNC: 124 MG/DL (ref 70–99)
GLUCOSE BLDC GLUCOMTR-MCNC: 126 MG/DL (ref 70–99)
GLUCOSE BLDC GLUCOMTR-MCNC: 131 MG/DL (ref 70–99)
GLUCOSE BLDC GLUCOMTR-MCNC: 135 MG/DL (ref 70–99)
GLUCOSE BLDC GLUCOMTR-MCNC: 142 MG/DL (ref 70–99)
GLUCOSE BLDC GLUCOMTR-MCNC: 145 MG/DL (ref 70–99)
GLUCOSE BLDC GLUCOMTR-MCNC: 148 MG/DL (ref 70–99)
GLUCOSE BLDC GLUCOMTR-MCNC: 160 MG/DL (ref 70–99)
GLUCOSE SERPL-MCNC: 143 MG/DL (ref 70–99)
HCT VFR BLD AUTO: 32.1 % (ref 35–47)
HGB BLD-MCNC: 9.8 G/DL (ref 11.7–15.7)
HYPOCHROMIA BLD QL: PRESENT
LYMPHOCYTES # BLD AUTO: 4.8 10E9/L (ref 0.8–5.3)
LYMPHOCYTES NFR BLD AUTO: 22 %
Lab: NORMAL
MAGNESIUM SERPL-MCNC: 1.8 MG/DL (ref 1.6–2.3)
MCH RBC QN AUTO: 25.6 PG (ref 26.5–33)
MCHC RBC AUTO-ENTMCNC: 30.5 G/DL (ref 31.5–36.5)
MCV RBC AUTO: 84 FL (ref 78–100)
METAMYELOCYTES # BLD: 0.2 10E9/L
METAMYELOCYTES NFR BLD MANUAL: 1 %
MICROCYTES BLD QL SMEAR: PRESENT
MONOCYTES # BLD AUTO: 3.3 10E9/L (ref 0–1.3)
MONOCYTES NFR BLD AUTO: 15 %
NEUTROPHILS # BLD AUTO: 12.8 10E9/L (ref 1.6–8.3)
NEUTROPHILS NFR BLD AUTO: 59 %
PLATELET # BLD AUTO: 347 10E9/L (ref 150–450)
PLATELET # BLD EST: ABNORMAL 10*3/UL
POTASSIUM SERPL-SCNC: 3.5 MMOL/L (ref 3.4–5.3)
RBC # BLD AUTO: 3.83 10E12/L (ref 3.8–5.2)
ROULEAUX BLD QL SMEAR: ABNORMAL
SODIUM SERPL-SCNC: 144 MMOL/L (ref 133–144)
SPECIMEN SOURCE: NORMAL
WBC # BLD AUTO: 21.7 10E9/L (ref 4–11)

## 2019-02-05 PROCEDURE — 25000132 ZZH RX MED GY IP 250 OP 250 PS 637: Performed by: INTERNAL MEDICINE

## 2019-02-05 PROCEDURE — 25000128 H RX IP 250 OP 636: Performed by: INTERNAL MEDICINE

## 2019-02-05 PROCEDURE — 94640 AIRWAY INHALATION TREATMENT: CPT

## 2019-02-05 PROCEDURE — 80048 BASIC METABOLIC PNL TOTAL CA: CPT | Performed by: INTERNAL MEDICINE

## 2019-02-05 PROCEDURE — 25000125 ZZHC RX 250: Performed by: INTERNAL MEDICINE

## 2019-02-05 PROCEDURE — 94640 AIRWAY INHALATION TREATMENT: CPT | Mod: 76

## 2019-02-05 PROCEDURE — 40000275 ZZH STATISTIC RCP TIME EA 10 MIN

## 2019-02-05 PROCEDURE — 25000132 ZZH RX MED GY IP 250 OP 250 PS 637: Performed by: SURGERY

## 2019-02-05 PROCEDURE — 00000146 ZZHCL STATISTIC GLUCOSE BY METER IP

## 2019-02-05 PROCEDURE — 85025 COMPLETE CBC W/AUTO DIFF WBC: CPT | Performed by: INTERNAL MEDICINE

## 2019-02-05 PROCEDURE — 83735 ASSAY OF MAGNESIUM: CPT | Performed by: INTERNAL MEDICINE

## 2019-02-05 PROCEDURE — 94003 VENT MGMT INPAT SUBQ DAY: CPT

## 2019-02-05 PROCEDURE — 20000003 ZZH R&B ICU

## 2019-02-05 PROCEDURE — 99233 SBSQ HOSP IP/OBS HIGH 50: CPT | Performed by: INTERNAL MEDICINE

## 2019-02-05 PROCEDURE — 25000128 H RX IP 250 OP 636: Performed by: SURGERY

## 2019-02-05 PROCEDURE — 99291 CRITICAL CARE FIRST HOUR: CPT | Performed by: INTERNAL MEDICINE

## 2019-02-05 PROCEDURE — 25000125 ZZHC RX 250: Performed by: SURGERY

## 2019-02-05 RX ADMIN — OXYCODONE HYDROCHLORIDE 5 MG: 5 SOLUTION ORAL at 20:19

## 2019-02-05 RX ADMIN — POTASSIUM CHLORIDE 20 MEQ: 1.5 POWDER, FOR SOLUTION ORAL at 05:48

## 2019-02-05 RX ADMIN — DEXMEDETOMIDINE 0.6 MCG/KG/HR: 100 INJECTION, SOLUTION, CONCENTRATE INTRAVENOUS at 20:35

## 2019-02-05 RX ADMIN — BACLOFEN 10 MG: 10 TABLET ORAL at 21:03

## 2019-02-05 RX ADMIN — GABAPENTIN 600 MG: 600 TABLET, FILM COATED ORAL at 08:01

## 2019-02-05 RX ADMIN — ATORVASTATIN CALCIUM 10 MG: 10 TABLET, FILM COATED ORAL at 08:01

## 2019-02-05 RX ADMIN — SODIUM CHLORIDE, PRESERVATIVE FREE 5 ML: 5 INJECTION INTRAVENOUS at 04:20

## 2019-02-05 RX ADMIN — LEVALBUTEROL HYDROCHLORIDE 0.63 MG: 0.63 SOLUTION RESPIRATORY (INHALATION) at 07:28

## 2019-02-05 RX ADMIN — TAZOBACTAM SODIUM AND PIPERACILLIN SODIUM 3.38 G: 375; 3 INJECTION, SOLUTION INTRAVENOUS at 15:31

## 2019-02-05 RX ADMIN — LEVALBUTEROL HYDROCHLORIDE 0.63 MG: 0.63 SOLUTION RESPIRATORY (INHALATION) at 03:34

## 2019-02-05 RX ADMIN — ASPIRIN 81 MG 162 MG: 81 TABLET ORAL at 08:02

## 2019-02-05 RX ADMIN — DEXMEDETOMIDINE 0.6 MCG/KG/HR: 100 INJECTION, SOLUTION, CONCENTRATE INTRAVENOUS at 02:44

## 2019-02-05 RX ADMIN — INSULIN ASPART 1 UNITS: 100 INJECTION, SOLUTION INTRAVENOUS; SUBCUTANEOUS at 15:55

## 2019-02-05 RX ADMIN — HEPARIN SODIUM 5000 UNITS: 5000 INJECTION, SOLUTION INTRAVENOUS; SUBCUTANEOUS at 07:57

## 2019-02-05 RX ADMIN — TAZOBACTAM SODIUM AND PIPERACILLIN SODIUM 3.38 G: 375; 3 INJECTION, SOLUTION INTRAVENOUS at 21:15

## 2019-02-05 RX ADMIN — SODIUM CHLORIDE, PRESERVATIVE FREE 5 ML: 5 INJECTION INTRAVENOUS at 10:05

## 2019-02-05 RX ADMIN — BACLOFEN 10 MG: 10 TABLET ORAL at 15:32

## 2019-02-05 RX ADMIN — RANITIDINE HYDROCHLORIDE 150 MG: 15 SOLUTION ORAL at 08:01

## 2019-02-05 RX ADMIN — OXYCODONE HYDROCHLORIDE 5 MG: 5 SOLUTION ORAL at 03:48

## 2019-02-05 RX ADMIN — MULTIVITAMIN 15 ML: LIQUID ORAL at 08:01

## 2019-02-05 RX ADMIN — ACETAMINOPHEN 650 MG: 325 SOLUTION ORAL at 21:02

## 2019-02-05 RX ADMIN — GABAPENTIN 600 MG: 600 TABLET, FILM COATED ORAL at 15:32

## 2019-02-05 RX ADMIN — HEPARIN SODIUM 5000 UNITS: 5000 INJECTION, SOLUTION INTRAVENOUS; SUBCUTANEOUS at 15:32

## 2019-02-05 RX ADMIN — INSULIN ASPART 1 UNITS: 100 INJECTION, SOLUTION INTRAVENOUS; SUBCUTANEOUS at 12:16

## 2019-02-05 RX ADMIN — OXYCODONE HYDROCHLORIDE 5 MG: 5 SOLUTION ORAL at 12:23

## 2019-02-05 RX ADMIN — GABAPENTIN 600 MG: 600 TABLET, FILM COATED ORAL at 21:03

## 2019-02-05 RX ADMIN — DEXMEDETOMIDINE 0.6 MCG/KG/HR: 100 INJECTION, SOLUTION, CONCENTRATE INTRAVENOUS at 14:53

## 2019-02-05 RX ADMIN — BACLOFEN 10 MG: 10 TABLET ORAL at 08:01

## 2019-02-05 RX ADMIN — LEVALBUTEROL HYDROCHLORIDE 0.63 MG: 0.63 SOLUTION RESPIRATORY (INHALATION) at 15:34

## 2019-02-05 RX ADMIN — OXYCODONE HYDROCHLORIDE 5 MG: 5 SOLUTION ORAL at 15:32

## 2019-02-05 RX ADMIN — TAZOBACTAM SODIUM AND PIPERACILLIN SODIUM 3.38 G: 375; 3 INJECTION, SOLUTION INTRAVENOUS at 10:05

## 2019-02-05 RX ADMIN — LEVALBUTEROL HYDROCHLORIDE 0.63 MG: 0.63 SOLUTION RESPIRATORY (INHALATION) at 19:32

## 2019-02-05 RX ADMIN — INSULIN ASPART 1 UNITS: 100 INJECTION, SOLUTION INTRAVENOUS; SUBCUTANEOUS at 08:15

## 2019-02-05 RX ADMIN — ACETAMINOPHEN 650 MG: 325 SOLUTION ORAL at 03:53

## 2019-02-05 RX ADMIN — TAZOBACTAM SODIUM AND PIPERACILLIN SODIUM 3.38 G: 375; 3 INJECTION, SOLUTION INTRAVENOUS at 03:49

## 2019-02-05 RX ADMIN — RANITIDINE HYDROCHLORIDE 150 MG: 15 SOLUTION ORAL at 21:03

## 2019-02-05 RX ADMIN — AMIODARONE HYDROCHLORIDE 400 MG: 200 TABLET ORAL at 08:01

## 2019-02-05 RX ADMIN — DEXMEDETOMIDINE 0.6 MCG/KG/HR: 100 INJECTION, SOLUTION, CONCENTRATE INTRAVENOUS at 10:28

## 2019-02-05 RX ADMIN — OXYCODONE HYDROCHLORIDE 5 MG: 5 SOLUTION ORAL at 07:58

## 2019-02-05 RX ADMIN — INSULIN ASPART 1 UNITS: 100 INJECTION, SOLUTION INTRAVENOUS; SUBCUTANEOUS at 04:45

## 2019-02-05 RX ADMIN — AMITRIPTYLINE HYDROCHLORIDE 100 MG: 50 TABLET, FILM COATED ORAL at 21:03

## 2019-02-05 RX ADMIN — Medication 2 G: at 05:44

## 2019-02-05 ASSESSMENT — ACTIVITIES OF DAILY LIVING (ADL)
ADLS_ACUITY_SCORE: 32

## 2019-02-05 ASSESSMENT — MIFFLIN-ST. JEOR: SCORE: 1839

## 2019-02-05 NOTE — PLAN OF CARE
ICU End of Shift Summary.  For vital signs and complete assessments, please see documentation flowsheets.     Pertinent assessments: Pt sedated with precedex, RASS -2 to -3, unable to follow directions.  Tele SR 's, BP stable (was soft for an hour), Temp max 102.2 esophageal probe-UC and BC sent.  LS coarse, diminished, tolerating vent with FiO2 55%, thick yellow secretions suctioned.  Wound cares as ordered.  Edema.  Bobo with good urine output.  Tolerating TF, smear of BM.  Major Shift Events: bath, UC and BC completed  Plan (Upcoming Events): continue to monitor UO, BP and fever  Discharge/Transfer Needs: TBD    Bedside Shift Report Completed : yes  Bedside Safety Check Completed: yes

## 2019-02-05 NOTE — PROGRESS NOTES
[facility]    Hospitalist Progress Note  Name: Amanda Richardson    MRN: [id number]  Provider: Ирина Montemayor MD  Date of Service: 02/05/2019    Assessment & Plan   Summary of Stay: Amanda Richardson is a 53 year old female with past medical history significant for advanced MS, diabetes mellitus, hypertension, history of T-cell lymphoma status post treatment, chronically elevated WBC count, history of recurrent UTIs who presented with altered mental status increased weakness and somnolence.  Evaluation in the emergency room showed significant leukocytosis, hypotension, acute renal failure, obstructing left kidney stone with moderate hydronephrosis and respiratory failure with acidosis with hypercapnia.  She was admitted was admitted on 1/30/2019 with septic shock.  Patient received IV antibiotics and fluid resuscitation in the emergency room and underwent emergent cystoscopy and left retrograde pyelogram with stent placement on 1/30/2019.  CT also showed closed fracture of right femur.  She was initially placed on BiPAP for respiratory failure but perioperatively was intubated. she was admitted to ICU for further evaluation.    1/31/2019 patient developed wide-complex tachycardia appeared to be SVT failed to respond to vasovagal maneuvers.  Adenosine was given which showed rhythm to be atrial flutter she received cardioversion and was started on amiodarone.  Angiotensin II drip was initiated  briefly for hypotension.  She is now off pressors.  Continues to be on Precedex.      Clinically fevers are trending down.  She is more awake today      Septic shock likely due to urinary tract source (altered mental status, acute renal failure, hypotension, leukocytosis, acidosis, respiratory failure)  --CT scan on admission showed obstructing stone with hydronephrosis and acute renal failure  --Likely secondary to infected kidney stone  --Status post cystoscopy and stent placement 1/30/2019.  Per report  patient did have purulent drainage noted during cystoscopy.  --Fluid resuscitation in the emergency room  --IV antibiotic started zosyn to cover for possible E. coli infection.  She also received a azithromycin for possible aspiration pneumonia as noted on CT  --Cultures positive for E coli  --Off pressors today  --Continues to be on Precedex.  --Remains intubated  --Weaning trial as tolerated      Hypercapnic respiratory failure  --Likely multifactorial from sepsis and chronic narcotic use  --Was intubated perioperatively  --Increased O2 needs.  Chest x-ray showed congestion was treated with IV diuresis.  --Daily weights      Acute renal failure with left hydronephrosis: Resolved now at baseline  --Secondary to obstructive uropathy.  Creatinine on admission 2.25 baseline 1  --Status post a stent placement  --We will monitor renal functions closely  --Avoid nephrotoxins  --Urology consulted and following    New Atrial flutter provoked by urosepsis  --Status post cardioversion 1/31/2019  --Appreciate cardiology consult  --Cardiac echo when compared to prior study showed minimal deterioration of left ventricular systolic function.  EF 50-55%  --No anticoagulation for now per cardiology patient was in atrial flutter for less than 8 hours  --Switched to oral amiodarone for a total of 2 weeks      Right femoral neck fracture  --Incidental finding on CT  --Patient has MS at baseline is able to pivot herself  --No pain noted on initial exam  --Orthopedic surgery was consulted.  No intervention recommended at this time given evidence of acute infection    Type 2 diabetes mellitus  --At baseline on metformin will hold for now  --Sliding scale insulin    MS with associated chronic pain  --Prior to admission on amitriptyline, baclofen, duloxetine, gabapentin and oxycodone  --PRN pain control  --PT evaluation  --Will need pain team consultation once extubated    Significant leukocytosis: Improving  --Patient has history of  T-cell non-Hodgkin's lymphoma status post treatment at baseline white cell count is in the mid teens  --Significant initial elevation likely due to sepsis    H/o Hypertension  --Was hypotensive on presentation  --Held losartan and hydrochlorothiazide    Bilateral inguinal wounds  --On admission concern for suppurative hydradenitis  --Wound culture sent  --1 dose of IV Vanco given for possible MRSA on admission          DVT Prophylaxis: Pneumatic Compression Devices  Code Status: Full Code    Disposition: To be decided intubated      Interval History   Reviewed chart.  Patient  intubated unable to get review of systems.  But awake responding following commands.  Heart rate controlled on amiodarone  We will continue diuresis today    -Data reviewed today: I reviewed all new labs and imaging reports over the last 24 hours. I personally reviewed no images or EKG's today.    Physical Exam   Temp: 99  F (37.2  C) Temp src: Esophageal BP: 122/56 Pulse: 80 Heart Rate: 70 Resp: 14 SpO2: 93 % O2 Device: Mechanical Ventilator    Vitals:    02/03/19 0000 02/04/19 0400 02/05/19 0000   Weight: 130.9 kg (288 lb 9.3 oz) 126.3 kg (278 lb 7.1 oz) 125.9 kg (277 lb 9 oz)     Vital Signs with Ranges  Temp:  [98.3  F (36.8  C)-102.2  F (39  C)] 99  F (37.2  C)  Pulse:  [] 80  Heart Rate:  [] 70  Resp:  [11-28] 14  BP: ()/(43-73) 122/56  FiO2 (%):  [55 %] 55 %  SpO2:  [92 %-97 %] 93 %  I/O last 3 completed shifts:  In: 2144.85 [I.V.:1504.85; NG/GT:640]  Out: 5600 [Urine:5600]      GEN: Intubated and sedated NAD.  HEENT:  Normocephalic/atraumatic, no scleral icterus, no nasal discharge, mouth dry  CV: Tachycardic no murmur or JVD.  S1 + S2 noted, no S3 or S4.  LUNGSleft basilar crackles.  Symmetric chest rise on inhalation noted.  ABD:  Active bowel sounds, soft, non-tender/non-distended.  No rebound/guarding/rigidity.  Bilateral inguinal wounds ?  hidradenitis  EXT: + edema.  No cyanosis.    SKIN:  Dry to touch, chronic  stasis dermatitis and bilateral inguinal hidradenitis    Medications     dexmedetomidine 0.6 mcg/kg/hr (02/05/19 1028)     IV fluid REPLACEMENT ONLY       fentaNYL 50 mcg/hr (02/05/19 0800)     Patient RECEIVING antibiotic to treat a different condition and it provides ADEQUATE COVERAGE for this surgical procedure.       sodium chloride 10 mL/hr at 02/05/19 1200     sodium chloride 10 mL/hr at 02/05/19 1200       [START ON 2/10/2019] amiodarone  200 mg Oral Daily     amiodarone  400 mg Oral Daily     amitriptyline  100 mg Oral or Feeding Tube At Bedtime     aspirin  162 mg Oral or Feeding Tube Daily     atorvastatin  10 mg Oral or Feeding Tube Daily     baclofen  10 mg Oral or Feeding Tube TID     gabapentin  600 mg Oral or Feeding Tube TID     heparin lock flush  5-10 mL Intracatheter Q24H     heparin  5,000 Units Subcutaneous Q8H     insulin aspart  1-6 Units Subcutaneous Q4H     levalbuterol  0.63 mg Nebulization Q6H     multivitamins w/minerals  15 mL Per Feeding Tube Daily     oxyCODONE  5 mg Oral or Feeding Tube Q4H     piperacillin-tazobactam  3.375 g Intravenous Q6H     rantidine  150 mg Oral or Feeding Tube BID     sodium chloride (PF)  3 mL Intracatheter Q8H     Data     Recent Labs   Lab 01/31/19  1753 01/31/19  0555 01/30/19  2220 01/30/19  1840 01/30/19  1635   PH  --  7.37 7.28* 7.29*  --    PHV 7.39  --   --   --  7.29*   PO2  --  102 108* 75*  --    PO2V 43  --   --   --  57*   PCO2  --  44 55* 52*  --    PCO2V 49  --   --   --  62*   HCO3  --  26 26 25  --    HCO3V 29*  --   --   --  30*     Recent Labs   Lab 02/05/19  0420 02/04/19  0545 02/03/19  0600   WBC 21.7* 21.6* 19.5*   HGB 9.8* 10.3* 9.3*   HCT 32.1* 33.7* 29.5*   MCV 84 83 82    306 209     Recent Labs   Lab 02/05/19  0420 02/04/19  0545 02/03/19  1249 02/03/19  0600    141  --  140   POTASSIUM 3.5 3.7 3.6 3.5   CHLORIDE 105 103  --  103   CO2 34* 33*  --  29   ANIONGAP 5 5  --  8   * 129*  --  148*   BUN 32* 30  --   33*   CR 0.90 0.80  --  0.81   GFRESTIMATED 71 82  --  81   GFRESTBLACK 82 >90  --  >90   MARY 7.3* 7.5*  --  7.2*     Recent Labs   Lab 02/04/19  2130 01/31/19  0410 01/30/19  2120 01/30/19 2016 01/30/19  1522 01/30/19  1512   CULT Culture negative < 24 hours, reincubate  No growth after 7 hours Moderate growth  Normal geovanna   Light growth  Escherichia coli  *  Moderate growth  beta hemolytic   Streptococcus constellatus  *  Moderate growth  Normal skin geovanna    Moderate growth  Normal skin geovanna    Moderate growth  beta hemolytic   Streptococcus constellatus  Susceptibility testing done on previous specimen  * 50,000 to 100,000 colonies/mL  Lactose fermenting gram negative rods  *  10,000 to 50,000 colonies/mL  Strain 2  Lactose fermenting gram negative rods  *  <10,000 colonies/mL  Strain 3  Lactose fermenting gram negative rods  *  <10,000 colonies/mL  Strain 4  Lactose fermenting gram negative rods  *  Susceptibility testing not routinely done Cultured on the 1st day of incubation:  Escherichia coli  Susceptibility testing done on previous specimen  *  Critical Value/Significant Value, preliminary result only, called to and read back by  Beronica Woods  RN @0732 01/31/19 gd   Cultured on the 1st day of incubation:  Escherichia coli  *  Critical Value/Significant Value, preliminary result only, called to and read back by  SHAMAR PEREZ RN @0634 1/31/19. SCG    (Note)  POSITIVE for E.COLI by Verigene multiplex nucleic acid test. Final  identification and antimicrobial susceptibility testing will be  verified by standard methods. Verigene test will not distinguish  E.coli from Shigella species including S.dysenteriae, S.flexneri,  S.boydii, and S.sonnei. Specimens containing Shigella species or  E.coli will be reported as Positive for E.coli.    Specimen tested with Verigene multiplex, gram-negative blood culture  nucleic acid test for the following targets: Acinetobacter sp.,  Citrobacter sp.,  Enterobacter sp., Proteus sp., E. coli, K.  pneumoniae/oxytoca, P. aeruginosa, and the following resistance  markers: CTXM, KPC, NDM, VIM, IMP and OXA.    Critical Value/Significant Value called to and read back by MARICARMEN ORTEZ RN [facility] 0844 1.31.19 MJ       Recent Labs   Lab 02/05/19  0420 02/04/19  0545 02/03/19  0600   HGB 9.8* 10.3* 9.3*     Recent Labs   Lab 01/30/19  1258   *   ALT 89*   ALKPHOS 163*   BILITOTAL 0.9     No results for input(s): INR in the last 168 hours.  Recent Labs   Lab 01/30/19  1258   LACT 1.2     Recent Labs   Lab 01/30/19  1448   COLOR Stacy   APPEARANCE Cloudy   URINEGLC Negative   URINEBILI Negative   URINEKETONE Negative   SG 1.018   UBLD Moderate*   URINEPH 5.0   PROTEIN 100*   NITRITE Negative   LEUKEST Large*   RBCU 49*   WBCU 104*       No results found for this or any previous visit (from the past 24 hour(s)).

## 2019-02-06 LAB
ANION GAP SERPL CALCULATED.3IONS-SCNC: 2 MMOL/L (ref 3–14)
ANISOCYTOSIS BLD QL SMEAR: SLIGHT
BASOPHILS # BLD AUTO: 0 10E9/L (ref 0–0.2)
BASOPHILS NFR BLD AUTO: 0 %
BUN SERPL-MCNC: 29 MG/DL (ref 7–30)
CALCIUM SERPL-MCNC: 7.6 MG/DL (ref 8.5–10.1)
CHLORIDE SERPL-SCNC: 107 MMOL/L (ref 94–109)
CO2 SERPL-SCNC: 36 MMOL/L (ref 20–32)
CREAT SERPL-MCNC: 0.73 MG/DL (ref 0.52–1.04)
DIFFERENTIAL METHOD BLD: ABNORMAL
EOSINOPHIL # BLD AUTO: 0 10E9/L (ref 0–0.7)
EOSINOPHIL NFR BLD AUTO: 0 %
ERYTHROCYTE [DISTWIDTH] IN BLOOD BY AUTOMATED COUNT: 17.9 % (ref 10–15)
GFR SERPL CREATININE-BSD FRML MDRD: >90 ML/MIN/{1.73_M2}
GLUCOSE BLDC GLUCOMTR-MCNC: 123 MG/DL (ref 70–99)
GLUCOSE BLDC GLUCOMTR-MCNC: 127 MG/DL (ref 70–99)
GLUCOSE BLDC GLUCOMTR-MCNC: 135 MG/DL (ref 70–99)
GLUCOSE BLDC GLUCOMTR-MCNC: 138 MG/DL (ref 70–99)
GLUCOSE BLDC GLUCOMTR-MCNC: 140 MG/DL (ref 70–99)
GLUCOSE BLDC GLUCOMTR-MCNC: 142 MG/DL (ref 70–99)
GLUCOSE SERPL-MCNC: 136 MG/DL (ref 70–99)
HCT VFR BLD AUTO: 31.1 % (ref 35–47)
HGB BLD-MCNC: 9.2 G/DL (ref 11.7–15.7)
HYPOCHROMIA BLD QL: PRESENT
LYMPHOCYTES # BLD AUTO: 2.3 10E9/L (ref 0.8–5.3)
LYMPHOCYTES NFR BLD AUTO: 12 %
MACROCYTES BLD QL SMEAR: PRESENT
MAGNESIUM SERPL-MCNC: 2.1 MG/DL (ref 1.6–2.3)
MCH RBC QN AUTO: 25.8 PG (ref 26.5–33)
MCHC RBC AUTO-ENTMCNC: 29.6 G/DL (ref 31.5–36.5)
MCV RBC AUTO: 87 FL (ref 78–100)
METAMYELOCYTES # BLD: 0.4 10E9/L
METAMYELOCYTES NFR BLD MANUAL: 2 %
MONOCYTES # BLD AUTO: 1.3 10E9/L (ref 0–1.3)
MONOCYTES NFR BLD AUTO: 7 %
NEUTROPHILS # BLD AUTO: 15.2 10E9/L (ref 1.6–8.3)
NEUTROPHILS NFR BLD AUTO: 79 %
PHOSPHATE SERPL-MCNC: 3.1 MG/DL (ref 2.5–4.5)
PLATELET # BLD AUTO: 346 10E9/L (ref 150–450)
PLATELET # BLD EST: ABNORMAL 10*3/UL
POTASSIUM SERPL-SCNC: 4 MMOL/L (ref 3.4–5.3)
RBC # BLD AUTO: 3.56 10E12/L (ref 3.8–5.2)
ROULEAUX BLD QL SMEAR: ABNORMAL
SODIUM SERPL-SCNC: 145 MMOL/L (ref 133–144)
WBC # BLD AUTO: 19.2 10E9/L (ref 4–11)

## 2019-02-06 PROCEDURE — 25000132 ZZH RX MED GY IP 250 OP 250 PS 637: Performed by: SURGERY

## 2019-02-06 PROCEDURE — 25000128 H RX IP 250 OP 636: Performed by: SURGERY

## 2019-02-06 PROCEDURE — 94003 VENT MGMT INPAT SUBQ DAY: CPT

## 2019-02-06 PROCEDURE — 25000132 ZZH RX MED GY IP 250 OP 250 PS 637: Performed by: INTERNAL MEDICINE

## 2019-02-06 PROCEDURE — 94640 AIRWAY INHALATION TREATMENT: CPT | Mod: 76

## 2019-02-06 PROCEDURE — 25000128 H RX IP 250 OP 636: Performed by: INTERNAL MEDICINE

## 2019-02-06 PROCEDURE — 99233 SBSQ HOSP IP/OBS HIGH 50: CPT | Performed by: INTERNAL MEDICINE

## 2019-02-06 PROCEDURE — 00000146 ZZHCL STATISTIC GLUCOSE BY METER IP

## 2019-02-06 PROCEDURE — 83735 ASSAY OF MAGNESIUM: CPT | Performed by: INTERNAL MEDICINE

## 2019-02-06 PROCEDURE — 25000125 ZZHC RX 250: Performed by: INTERNAL MEDICINE

## 2019-02-06 PROCEDURE — 25000125 ZZHC RX 250: Performed by: SURGERY

## 2019-02-06 PROCEDURE — 80048 BASIC METABOLIC PNL TOTAL CA: CPT | Performed by: INTERNAL MEDICINE

## 2019-02-06 PROCEDURE — 99291 CRITICAL CARE FIRST HOUR: CPT | Performed by: INTERNAL MEDICINE

## 2019-02-06 PROCEDURE — 27210437 ZZH NUTRITION PRODUCT SEMIELEM INTERMED LITER

## 2019-02-06 PROCEDURE — 20000003 ZZH R&B ICU

## 2019-02-06 PROCEDURE — 94640 AIRWAY INHALATION TREATMENT: CPT

## 2019-02-06 PROCEDURE — 40000275 ZZH STATISTIC RCP TIME EA 10 MIN

## 2019-02-06 PROCEDURE — 85025 COMPLETE CBC W/AUTO DIFF WBC: CPT | Performed by: INTERNAL MEDICINE

## 2019-02-06 PROCEDURE — 84100 ASSAY OF PHOSPHORUS: CPT | Performed by: INTERNAL MEDICINE

## 2019-02-06 RX ORDER — CEFAZOLIN SODIUM 1 G/50ML
1750 SOLUTION INTRAVENOUS EVERY 12 HOURS
Status: DISCONTINUED | OUTPATIENT
Start: 2019-02-06 | End: 2019-02-06

## 2019-02-06 RX ADMIN — RANITIDINE HYDROCHLORIDE 150 MG: 15 SOLUTION ORAL at 08:22

## 2019-02-06 RX ADMIN — RANITIDINE HYDROCHLORIDE 150 MG: 15 SOLUTION ORAL at 20:35

## 2019-02-06 RX ADMIN — OXYCODONE HYDROCHLORIDE 5 MG: 5 SOLUTION ORAL at 11:30

## 2019-02-06 RX ADMIN — ACETAMINOPHEN 650 MG: 325 SOLUTION ORAL at 06:03

## 2019-02-06 RX ADMIN — POTASSIUM CHLORIDE 20 MEQ: 1.5 POWDER, FOR SOLUTION ORAL at 06:03

## 2019-02-06 RX ADMIN — BACLOFEN 10 MG: 10 TABLET ORAL at 08:22

## 2019-02-06 RX ADMIN — GABAPENTIN 600 MG: 600 TABLET, FILM COATED ORAL at 22:32

## 2019-02-06 RX ADMIN — GABAPENTIN 600 MG: 600 TABLET, FILM COATED ORAL at 15:52

## 2019-02-06 RX ADMIN — BACLOFEN 10 MG: 10 TABLET ORAL at 22:32

## 2019-02-06 RX ADMIN — GABAPENTIN 600 MG: 600 TABLET, FILM COATED ORAL at 08:25

## 2019-02-06 RX ADMIN — ASPIRIN 81 MG 162 MG: 81 TABLET ORAL at 08:23

## 2019-02-06 RX ADMIN — TAZOBACTAM SODIUM AND PIPERACILLIN SODIUM 3.38 G: 375; 3 INJECTION, SOLUTION INTRAVENOUS at 16:27

## 2019-02-06 RX ADMIN — DEXMEDETOMIDINE 0.7 MCG/KG/HR: 100 INJECTION, SOLUTION, CONCENTRATE INTRAVENOUS at 06:55

## 2019-02-06 RX ADMIN — TAZOBACTAM SODIUM AND PIPERACILLIN SODIUM 3.38 G: 375; 3 INJECTION, SOLUTION INTRAVENOUS at 03:49

## 2019-02-06 RX ADMIN — HEPARIN SODIUM 5000 UNITS: 5000 INJECTION, SOLUTION INTRAVENOUS; SUBCUTANEOUS at 00:14

## 2019-02-06 RX ADMIN — OXYCODONE HYDROCHLORIDE 5 MG: 5 SOLUTION ORAL at 03:49

## 2019-02-06 RX ADMIN — OXYCODONE HYDROCHLORIDE 5 MG: 5 SOLUTION ORAL at 15:51

## 2019-02-06 RX ADMIN — DOCUSATE SODIUM 100 MG: 50 LIQUID ORAL at 11:30

## 2019-02-06 RX ADMIN — SODIUM CHLORIDE, PRESERVATIVE FREE 5 ML: 5 INJECTION INTRAVENOUS at 10:13

## 2019-02-06 RX ADMIN — HEPARIN SODIUM 5000 UNITS: 5000 INJECTION, SOLUTION INTRAVENOUS; SUBCUTANEOUS at 23:45

## 2019-02-06 RX ADMIN — LEVALBUTEROL HYDROCHLORIDE 0.63 MG: 0.63 SOLUTION RESPIRATORY (INHALATION) at 08:09

## 2019-02-06 RX ADMIN — HEPARIN SODIUM 5000 UNITS: 5000 INJECTION, SOLUTION INTRAVENOUS; SUBCUTANEOUS at 15:52

## 2019-02-06 RX ADMIN — INSULIN ASPART 1 UNITS: 100 INJECTION, SOLUTION INTRAVENOUS; SUBCUTANEOUS at 08:25

## 2019-02-06 RX ADMIN — INSULIN ASPART 1 UNITS: 100 INJECTION, SOLUTION INTRAVENOUS; SUBCUTANEOUS at 11:40

## 2019-02-06 RX ADMIN — OXYCODONE HYDROCHLORIDE 5 MG: 5 SOLUTION ORAL at 23:45

## 2019-02-06 RX ADMIN — ATORVASTATIN CALCIUM 10 MG: 10 TABLET, FILM COATED ORAL at 08:22

## 2019-02-06 RX ADMIN — DEXMEDETOMIDINE 0.6 MCG/KG/HR: 100 INJECTION, SOLUTION, CONCENTRATE INTRAVENOUS at 01:50

## 2019-02-06 RX ADMIN — DEXMEDETOMIDINE 0.7 MCG/KG/HR: 100 INJECTION, SOLUTION, CONCENTRATE INTRAVENOUS at 16:27

## 2019-02-06 RX ADMIN — BACLOFEN 10 MG: 10 TABLET ORAL at 15:52

## 2019-02-06 RX ADMIN — LEVALBUTEROL HYDROCHLORIDE 0.63 MG: 0.63 SOLUTION RESPIRATORY (INHALATION) at 19:56

## 2019-02-06 RX ADMIN — HEPARIN SODIUM 5000 UNITS: 5000 INJECTION, SOLUTION INTRAVENOUS; SUBCUTANEOUS at 07:28

## 2019-02-06 RX ADMIN — OXYCODONE HYDROCHLORIDE 5 MG: 5 SOLUTION ORAL at 00:14

## 2019-02-06 RX ADMIN — LEVALBUTEROL HYDROCHLORIDE 0.63 MG: 0.63 SOLUTION RESPIRATORY (INHALATION) at 16:31

## 2019-02-06 RX ADMIN — TAZOBACTAM SODIUM AND PIPERACILLIN SODIUM 3.38 G: 375; 3 INJECTION, SOLUTION INTRAVENOUS at 22:32

## 2019-02-06 RX ADMIN — DEXMEDETOMIDINE 0.7 MCG/KG/HR: 100 INJECTION, SOLUTION, CONCENTRATE INTRAVENOUS at 21:58

## 2019-02-06 RX ADMIN — TAZOBACTAM SODIUM AND PIPERACILLIN SODIUM 3.38 G: 375; 3 INJECTION, SOLUTION INTRAVENOUS at 10:12

## 2019-02-06 RX ADMIN — DEXMEDETOMIDINE 0.7 MCG/KG/HR: 100 INJECTION, SOLUTION, CONCENTRATE INTRAVENOUS at 11:45

## 2019-02-06 RX ADMIN — LEVALBUTEROL HYDROCHLORIDE 0.63 MG: 0.63 SOLUTION RESPIRATORY (INHALATION) at 04:51

## 2019-02-06 RX ADMIN — AMITRIPTYLINE HYDROCHLORIDE 100 MG: 50 TABLET, FILM COATED ORAL at 22:32

## 2019-02-06 RX ADMIN — VANCOMYCIN HYDROCHLORIDE 1750 MG: 10 INJECTION, POWDER, LYOPHILIZED, FOR SOLUTION INTRAVENOUS at 06:55

## 2019-02-06 RX ADMIN — MULTIVITAMIN 15 ML: LIQUID ORAL at 08:25

## 2019-02-06 RX ADMIN — Medication 50 MCG/HR: at 06:03

## 2019-02-06 RX ADMIN — OXYCODONE HYDROCHLORIDE 5 MG: 5 SOLUTION ORAL at 07:28

## 2019-02-06 RX ADMIN — AMIODARONE HYDROCHLORIDE 400 MG: 200 TABLET ORAL at 08:23

## 2019-02-06 RX ADMIN — OXYCODONE HYDROCHLORIDE 5 MG: 5 SOLUTION ORAL at 20:34

## 2019-02-06 ASSESSMENT — ACTIVITIES OF DAILY LIVING (ADL)
ADLS_ACUITY_SCORE: 32

## 2019-02-06 ASSESSMENT — MIFFLIN-ST. JEOR: SCORE: 1848

## 2019-02-06 NOTE — PROGRESS NOTES
RT- patient remains intubated with ETT secure and patent secured at 25cm at the lip. Breath sounds diminished and coarse with lessened secretions. Current vent settings:    Ventilation Mode: CMV/AC  (Continuous Mandatory Ventilation/ Assist Control)  FiO2 (%): 50 %  Rate Set (breaths/minute): 14 breaths/min  Tidal Volume Set (mL): 550 mL  PEEP (cm H2O): 10 cmH2O  Oxygen Concentration (%): 50 %  Resp: 19    BVM with peep valve at the bedside. Patient getting scheduled Xopenex in line.    Will continue to monitor patient.    No wean this morning due to high peep.    Justina Davidson, RT  2/5/2019 6:27 PM

## 2019-02-06 NOTE — PROGRESS NOTES
Pt maintained throughout the night on the vent.    Vent settings  Ventilation Mode: CMV/AC  (Continuous Mandatory Ventilation/ Assist Control)  FiO2 (%): 50 %  Rate Set (breaths/minute): 14 breaths/min  Tidal Volume Set (mL): 550 mL  PEEP (cm H2O): 10 cmH2O  Oxygen Concentration (%): 50 %  Resp: 8    Pt's BS were coarse and diminished, suctioning out a large amount of cloudy thick secretions.    Pt's sat's have been in the low to mid 90's.    Will continue to follow and assess.    Anamika Cortez RT on 2/6/2019 at 5:43 AM

## 2019-02-06 NOTE — PROGRESS NOTES
[facility]    Hospitalist Progress Note  Name: Amanda Richardson    MRN: [id number]  Provider: Hermilo Yarbrough MD  Date of Service: 02/06/2019    Assessment & Plan   Summary of Stay: Amanda Richardson is a 53 year old female with past medical history significant for advanced MS, diabetes mellitus, hypertension, history of T-cell lymphoma status post treatment, chronically elevated WBC count, history of recurrent UTIs who presented with altered mental status increased weakness and somnolence.  Evaluation in the emergency room showed significant leukocytosis, hypotension, acute renal failure, obstructing left kidney stone with moderate hydronephrosis and respiratory failure with acidosis with hypercapnia.  She was admitted was admitted on 1/30/2019 with septic shock.  Patient received IV antibiotics and fluid resuscitation in the emergency room and underwent emergent cystoscopy and left retrograde pyelogram with stent placement on 1/30/2019.  CT also showed closed fracture of right femur.  She was initially placed on BiPAP for respiratory failure but perioperatively was intubated. she was admitted to ICU for further evaluation.    1/31/2019 patient developed wide-complex tachycardia appeared to be SVT failed to respond to vasovagal maneuvers.  Adenosine was given which showed rhythm to be atrial flutter she received cardioversion and was started on amiodarone.  Angiotensin II drip was initiated  briefly for hypotension.  She is now off pressors.  Continues to be on Precedex.      Clinically fevers are trending down.  She is more awake and interactive today.      Septic shock likely due to urinary tract source (altered mental status, acute renal failure, hypotension, leukocytosis, acidosis, respiratory failure)  --CT scan on admission showed obstructing stone with hydronephrosis and acute renal failure  --Likely secondary to infected kidney stone  --Status post cystoscopy and stent placement 1/30/2019.   Per report patient did have purulent drainage noted during cystoscopy.  --Fluid resuscitation in the emergency room  --IV antibiotic started zosyn to cover for possible E. coli infection.  She also received a azithromycin for possible aspiration pneumonia as noted on CT  --Cultures positive for E coli  --Off pressors today  --Continues to be on Precedex.  --Remains intubated  --Weaning trial as tolerated      Hypercapnic respiratory failure  --Likely multifactorial from sepsis and chronic narcotic use  --Was intubated perioperatively  --Increased O2 needs.  Chest x-ray showed congestion was treated with IV diuresis.  --Daily weights      Acute renal failure with left hydronephrosis: Resolved now at baseline  --Secondary to obstructive uropathy.  Creatinine on admission 2.25 baseline 1  --Status post a stent placement  --We will monitor renal functions closely  --Avoid nephrotoxins  --Urology consulted and following    New Atrial flutter provoked by urosepsis  --Status post cardioversion 1/31/2019  --Appreciate cardiology consult  --Cardiac echo when compared to prior study showed minimal deterioration of left ventricular systolic function.  EF 50-55%  --No anticoagulation for now per cardiology patient was in atrial flutter for less than 8 hours  --Switched to oral amiodarone for a total of 2 weeks    Right femoral neck fracture  --Incidental finding on CT  --Patient has MS at baseline is able to pivot herself  --No pain noted on initial exam  --Orthopedic surgery was consulted.  No intervention recommended at this time given evidence of acute infection    Type 2 diabetes mellitus  --At baseline on metformin will hold for now  --Sliding scale insulin    MS with associated chronic pain  --Prior to admission on amitriptyline, baclofen, duloxetine, gabapentin and oxycodone  --PRN pain control  --PT evaluation  --Will need pain team consultation once extubated    Significant leukocytosis: Improving  --Patient has  history of T-cell non-Hodgkin's lymphoma status post treatment at baseline white cell count is in the mid teens  --Significant initial elevation likely due to sepsis    H/o Hypertension  --Was hypotensive on presentation  --Held losartan and hydrochlorothiazide    Bilateral inguinal wounds  --On admission concern for suppurative hydradenitis  --Wound culture sent  --1 dose of IV Vanco given for possible MRSA on admission    Blood culture (1/1) positive with S epi identified.   --Likely contaminant. Stop Vanco.       DVT Prophylaxis: Pneumatic Compression Devices  Code Status: Full Code    Disposition: To be decided intubated      Interval History   Chart reviewed, pt interviewed.    Overall dramatically improved. Very interactive and appropriate.     -Data reviewed today: I reviewed all new labs and imaging reports over the last 24 hours. I personally reviewed no images or EKG's today.    Physical Exam   Temp: 97.2  F (36.2  C) Temp src: Esophageal BP: 125/66 Pulse: 74 Heart Rate: 73 Resp: 15 SpO2: 92 % O2 Device: Mechanical Ventilator    Vitals:    02/04/19 0400 02/05/19 0000 02/06/19 0600   Weight: 126.3 kg (278 lb 7.1 oz) 125.9 kg (277 lb 9 oz) 126.8 kg (279 lb 8.7 oz)     Vital Signs with Ranges  Temp:  [96.4  F (35.8  C)-99.7  F (37.6  C)] 97.2  F (36.2  C)  Pulse:  [61-87] 74  Heart Rate:  [65-89] 73  Resp:  [8-26] 15  BP: ()/(48-73) 125/66  FiO2 (%):  [45 %-55 %] 50 %  SpO2:  [92 %-100 %] 92 %  I/O last 3 completed shifts:  In: 4330.6 [I.V.:1710.6; NG/GT:1060]  Out: 2475 [Urine:2475]      GEN: Intubated and sedated NAD.  HEENT:  Normocephalic/atraumatic, no scleral icterus, no nasal discharge, mouth dry  CV: Tachycardic no murmur or JVD.  S1 + S2 noted, no S3 or S4.  LUNGSleft basilar crackles.  Symmetric chest rise on inhalation noted.  ABD:  Active bowel sounds, soft, non-tender/non-distended.  No rebound/guarding/rigidity.  Bilateral inguinal wounds ?  hidradenitis  EXT: + edema.  No cyanosis.     SKIN:  Dry to touch, chronic stasis dermatitis and bilateral inguinal hidradenitis    Medications     dexmedetomidine 0.7 mcg/kg/hr (02/06/19 0731)     IV fluid REPLACEMENT ONLY       fentanyl 50 mcg/hr (02/06/19 0731)     Patient RECEIVING antibiotic to treat a different condition and it provides ADEQUATE COVERAGE for this surgical procedure.       sodium chloride 10 mL/hr at 02/06/19 0730     sodium chloride 10 mL/hr at 02/06/19 0730       [START ON 2/10/2019] amiodarone  200 mg Oral Daily     amiodarone  400 mg Oral Daily     amitriptyline  100 mg Oral or Feeding Tube At Bedtime     aspirin  162 mg Oral or Feeding Tube Daily     atorvastatin  10 mg Oral or Feeding Tube Daily     baclofen  10 mg Oral or Feeding Tube TID     gabapentin  600 mg Oral or Feeding Tube TID     heparin lock flush  5-10 mL Intracatheter Q24H     heparin  5,000 Units Subcutaneous Q8H     insulin aspart  1-6 Units Subcutaneous Q4H     levalbuterol  0.63 mg Nebulization Q6H     multivitamins w/minerals  15 mL Per Feeding Tube Daily     oxyCODONE  5 mg Oral or Feeding Tube Q4H     piperacillin-tazobactam  3.375 g Intravenous Q6H     rantidine  150 mg Oral or Feeding Tube BID     sodium chloride (PF)  3 mL Intracatheter Q8H     vancomycin (VANCOCIN) IV  1,750 mg Intravenous Q12H     Data     Recent Labs   Lab 01/31/19  1753 01/31/19  0555 01/30/19  2220 01/30/19  1840 01/30/19  1635   PH  --  7.37 7.28* 7.29*  --    PHV 7.39  --   --   --  7.29*   PO2  --  102 108* 75*  --    PO2V 43  --   --   --  57*   PCO2  --  44 55* 52*  --    PCO2V 49  --   --   --  62*   HCO3  --  26 26 25  --    HCO3V 29*  --   --   --  30*     Recent Labs   Lab 02/06/19  0515 02/05/19  0420 02/04/19  0545   WBC 19.2* 21.7* 21.6*   HGB 9.2* 9.8* 10.3*   HCT 31.1* 32.1* 33.7*   MCV 87 84 83    347 306     Recent Labs   Lab 02/06/19  0515 02/05/19  0420 02/04/19  0545   * 144 141   POTASSIUM 4.0 3.5 3.7   CHLORIDE 107 105 103   CO2 36* 34* 33*   ANIONGAP  2* 5 5   * 143* 129*   BUN 29 32* 30   CR 0.73 0.90 0.80   GFRESTIMATED >90 71 82   GFRESTBLACK >90 82 >90   MARY 7.6* 7.3* 7.5*     Recent Labs   Lab 02/04/19  2130 01/31/19  0410 01/30/19  2120 01/30/19 2016 01/30/19  1522 01/30/19  1512   CULT Cultured on the 2nd day of incubation:  Gram positive cocci in clusters  *  Critical Value/Significant Value, preliminary result only, called to and read back by  Claudia Marsh, FREDO 1148 2/6/19. MS    No growth Moderate growth  Normal geovanna   Light growth  Escherichia coli  *  Moderate growth  beta hemolytic   Streptococcus constellatus  *  Moderate growth  Normal skin geovanna    Moderate growth  Normal skin geovanna    Moderate growth  beta hemolytic   Streptococcus constellatus  Susceptibility testing done on previous specimen  * 50,000 to 100,000 colonies/mL  Lactose fermenting gram negative rods  *  10,000 to 50,000 colonies/mL  Strain 2  Lactose fermenting gram negative rods  *  <10,000 colonies/mL  Strain 3  Lactose fermenting gram negative rods  *  <10,000 colonies/mL  Strain 4  Lactose fermenting gram negative rods  *  Susceptibility testing not routinely done Cultured on the 1st day of incubation:  Escherichia coli  Susceptibility testing done on previous specimen  *  Critical Value/Significant Value, preliminary result only, called to and read back by  Beronica Woods  RN @9951 01/31/19 gd   Cultured on the 1st day of incubation:  Escherichia coli  *  Critical Value/Significant Value, preliminary result only, called to and read back by  SHAMAR PEREZ RN @0634 1/31/19. SCG    (Note)  POSITIVE for E.COLI by Verigene multiplex nucleic acid test. Final  identification and antimicrobial susceptibility testing will be  verified by standard methods. Verigene test will not distinguish  E.coli from Shigella species including S.dysenteriae, S.flexneri,  S.boydii, and S.sonnei. Specimens containing Shigella species or  E.coli will be reported as Positive for  E.coli.    Specimen tested with evOLEDigene multiplex, gram-negative blood culture  nucleic acid test for the following targets: Acinetobacter sp.,  Citrobacter sp., Enterobacter sp., Proteus sp., E. coli, K.  pneumoniae/oxytoca, P. aeruginosa, and the following resistance  markers: CTXM, KPC, NDM, VIM, IMP and OXA.    Critical Value/Significant Value called to and read back by MARICARMEN ORTEZ RN [facility] 0844 1.31.19 MJ       Recent Labs   Lab 02/06/19  0515 02/05/19  0420 02/04/19  0545   HGB 9.2* 9.8* 10.3*     Recent Labs   Lab 01/30/19  1258   *   ALT 89*   ALKPHOS 163*   BILITOTAL 0.9     No results for input(s): INR in the last 168 hours.  Recent Labs   Lab 01/30/19  1258   LACT 1.2     Recent Labs   Lab 01/30/19  1448   COLOR Stacy   APPEARANCE Cloudy   URINEGLC Negative   URINEBILI Negative   URINEKETONE Negative   SG 1.018   UBLD Moderate*   URINEPH 5.0   PROTEIN 100*   NITRITE Negative   LEUKEST Large*   RBCU 49*   WBCU 104*       No results found for this or any previous visit (from the past 24 hour(s)).

## 2019-02-06 NOTE — PLAN OF CARE
ICU End of Shift Summary.  For vital signs and complete assessments, please see documentation flowsheets.     Pertinent assessments: pt alert and oriented, mostly awake on the vent. Pt restless at times, has chronic back pain and is uncomfortable after many days in bed. Pt remains with full vent support, unable to attempt wean due to high peep settings. Pt on IV fentanyl and oral oxycodone for her chronic pain, on precedex for sedation. Tube feeds at goal rate, tolerating well. Blood glucose levels stable. Pt on IV zosyn and vanco was added this am for positive blood cultures. Good urine output via ramos.   Major Shift Events: positive blood cultures, started vanco  Plan (Upcoming Events): continue current cares  Discharge/Transfer Needs: unclear    Bedside Shift Report Completed : yes  Bedside Safety Check Completed: yes    Right wrist and Left wrist restraints continued 2/6/2019    Clinical Justification: Pulling lines, pulling tubes, and pulling equipment  Less Restrictive Alternative: Repositioning, Re-evaluate equipment, Disguise equipment, Pain management  Attending Physician Notified: No (comment),     New orders placed Yes  Length of Order: 1 Day      Claudia Marsh

## 2019-02-06 NOTE — PROGRESS NOTES
"Intensivist note  She was comfortable on vent today. Her vent was down to 50% and +10 PEEP. She has been diuresed aggressively and now seems to be at a volume optimized state. We will continue to titrate down support as she tolerates, but suspect this is an acute lung injury that is slowly improving.    Assessment and Plan  1. Acute respiratory failure/ALI, and currently on 50% and +10 PEEP, due to septic shock, volume overload, and ALI. She has diuresed well over several days with slow improvement in oxygenation.   2. Recent episode of Vtach- currently on amiodarone, due to acute physiologic stress, and will complete a 2 week course of therapy.   3. Septic shock, finally resolving off pressors; due to E. Coli from urine/obstructed ureter  4. Obstructed left ureter, and now with stent in.   5. DM- glucoses ok  6. MS- barely ambulatory at her best  7. Right femoral neck fracture- discovered this admission incidentally  8. Acute Kidney injury- currently 0.9 with known baseline about 0.5. She may be towards \"dry\" side at present.   9. HTN  10. Dyslipidemia   11. Morbid obesity  12. History of T cell lymphoma treated in 2011  Overall, she remains critical. I spent 40 minutes of critical care time with her today.     Physical exam  Well developed female NAD  /57   Pulse 80   Temp 98.8  F (37.1  C)   Resp 13   Ht 1.626 m (5' 4\")   Wt 125.9 kg (277 lb 9 oz)   LMP 12/11/2011   SpO2 94%   BMI 47.64 kg/m    Lungs with mild rhonchi  Heart is RRR  Abdomen is soft and non-tender; obese  Extremities are warm with mild edema  Skin shows no cyanosis or mottling   CNS trace response to stimuli on exam today.     Labs reviewed    rufino strauss  February 5, 2019                        "

## 2019-02-06 NOTE — PROGRESS NOTES
UROLOGY    Emergent cystoscopy and left retrograde pyelogram with stent placement on 1/30/2019 for e coli urosepsis and obstructing right ureteral stone. Will need definitive stone management once recovered from acute issues. Continues to be intubated. Bobo clear.     Please call if urologic concerns during the hospitalization.     Lori Sen PA-C  [facility]  [phone]

## 2019-02-06 NOTE — PLAN OF CARE
16 Hours RN:  ICU End of Shift Summary.  For vital signs and complete assessments, please see documentation flowsheets.      Pertinent assessments: more awake this morning, able to follow commands. Vented and sedated. FIO2  50%, , Rate 14 and Peep of 10. Small yellow thick secretions suctioned through ETT and mouth. LS: dim. +BS. Incontinent  of stool. Tele: SR with BBB, Peaked T waves .Tolerated TF at 65 cc/hr. 60 CC of water flushes Q 4., wound care done. Bobo WDL in place, good UOP. 1+ BLE edema. [place] in color. Right IJ intact. Fentanyl infusing at 50mcg/hr. COPT score( 0). Precedex gtt infusing at 0.6 mcg/kg/hr . Rass score -1 to-2.   Major Shift Events: no major events, turned Q 2 hours,. Woc care is done. IV abx.   Plan (Upcoming Events): continue IV abx, monitor labs and Cultures, Continue pulmonary toileting, wean off FIO2 ,possible wean Peep in am and breathing trial.  Discharge/Transfer Needs: continue ICU cares for now     Bedside Shift Report Completed : yes  Bedside Safety Check Completed: yes     Bilateral soft restraints continued 2/5/2019     Clinical Justification: Pulling lines, pulling tubes, and pulling equipment  Less Restrictive Alternative: Repositioning, Re-evaluate equipment, Disguise equipment, Pain management  Attending Physician Notified: yes  New orders placed : yes  Length of Order: 1 Day        Beronica Neal    Addendum 7p-11p RN:  No Changes. VSS. No changes to gtts. Bed bath given.    Bilateral soft restraints continued 2/5/2019    Clinical Justification: Pulling lines, pulling tubes, and pulling equipment  Less Restrictive Alternative: Repositioning, Re-evaluate equipment, Disguise equipment, Pain management  Attending Physician Notified: Yes    New orders placed: yes  Length of Order: 1 Day      Beronica Neal

## 2019-02-06 NOTE — PHARMACY-VANCOMYCIN DOSING SERVICE
Pharmacy Vancomycin Note  Date of Service 2019  Patient's  1965  53 year old, female    Indication: Bacteremia    Current estimated CrCl = Estimated Creatinine Clearance: 114.8 mL/min (based on SCr of 0.73 mg/dL).    Creatinine for last 3 days  2019:  5:45 AM Creatinine 0.80 mg/dL  2019:  4:20 AM Creatinine 0.90 mg/dL  2019:  5:15 AM Creatinine 0.73 mg/dL    Recent Vancomycin Level(s) for last 3 days  2/3/2019: 10:53 PM Vancomycin Level 24.7 mg/L      Vancomycin IV Administrations (past 72 hours)                   vancomycin (VANCOCIN) 1,750 mg in sodium chloride 0.9 % 500 mL intermittent infusion (mg) 1,750 mg Given 19 1220     1,750 mg Given  0022                Nephrotoxins and other renal medications (From now, onward)    Start     Dose/Rate Route Frequency Ordered Stop    19 0645  vancomycin (VANCOCIN) 1,750 mg in sodium chloride 0.9 % 500 mL intermittent infusion      1,750 mg  over 2 Hours Intravenous EVERY 12 HOURS 19 0641      19 2130  piperacillin-tazobactam (ZOSYN) infusion 3.375 g     Comments:  Pharmacy can adjust dose based on renal function.    3.375 g  100 mL/hr over 30 Minutes Intravenous EVERY 6 HOURS 19 2043            Contrast Orders - past 72 hours (72h ago, onward)    None                Plan:  1.  Restart vancomycin  1750 mg IV q12h after therapy interruption.  2.  Goal Trough Level: 15-20 mg/L   3.  Pharmacy will check trough levels as appropriate in 1-3 Days.    4. Serum creatinine levels will be ordered daily for the first week of therapy and at least twice weekly for subsequent weeks.    5. Rome method utilized to dose vancomycin therapy: Method 2    Oscar Bennett, [facility]

## 2019-02-06 NOTE — PROGRESS NOTES
CLINICAL NUTRITION SERVICES - REASSESSMENT NOTE      Recommendations Ordered by Registered Dietitian (RD):   -Increased water flushes to 60 mL q 2 hours d/t high Na level.  -Increased TF rate to 75 mL/hr (1800 ml) to provide 1800 kcal, 166 g protein, 1512 ml free water, 137 g CHO, and 7 g fiber daily.   MALNUTRITION:  % Weight Loss: Does not meet criteria - some wt loss but likely fluid related  % Intake: Decreased intake does not meet criteria --> unable to verify PTA  Subcutaneous Fat Loss: None observed   Muscle Loss: Mild temporal scooping; clavicle, scapular region: mild-moderate depletion - baseline LBM masked by adiposity  Fluid Retention: +1 generalized trace edema --> not using as indicator at this time as do not suspect masking true wt trends during admit    Malnutrition Diagnosis: patient does not meet malnutrition criteria during admit, unable to verify PTA     EVALUATION OF PROGRESS TOWARD GOALS   Diet: NPO    Nutrition Support:      Type of Feeding Tube: 10 fr NG (2/01/2019, previous OG for feeding)    Enteral Frequency:  Continuous    Enteral Regimen: Peptamen Intense VHP at 65 mL/hr    Total Enteral Provisions: 1560 mL provides 1560 kcal (13 kcal/kg), 143 g protein (2.6 g/kg), 1310 ml free H2O, 119 g CHO and 6 g Fiber daily.     Meets >100% of DRI's --> Ok to continue daily MVI/M while advancing to goal    Free Water Flush: standard 60 mL q4 hours    Intake: TF running at goal as planned since 2/2 - meeting >100% of needs.    Biochemical Review:  -BG <180  -Na 145 (H)  -CRP (2/4) 57.7 (H)  -K and phos WNL    Wt trends:  -Current wt: 126.8 kg (up 1 kg from yesterday, but has been fluctuating up and down since admit - likely fluid related)    Stooling:  -BM today     ASSESSED NUTRITION NEEDS (PER APPROVED PRACTICE GUIDELINES, Dosing weight: 126 kg admit wt for energy, 54.5 kg IBW for protein):   Estimated Energy Needs: 0765-1357 kcals (11-14 Kcal/Kg)  Justification: obese and vented  Estimated Protein  Needs: >109 grams protein (>2.5 g pro/Kg)  Justification: hypercatabolism with critical illness  Estimated Fluid Needs: per MD  Justification: per MD    NEW FINDINGS:   Medications reviewed  -Sliding scale Insulin  -Precedex  -Fentanyl  -MVI/M - will continue per wound care guidelines    +1 generalized trace edema     WOCN note (2/4):   -Wound on buttocks - unsure if pressure-related as it is not over a bony prominence  -Bilateral groin skin folds    Previous Goals:   EN to meet % daily needs while NPO.  Evaluation: Met    Previous Nutrition Diagnosis:   Predicted suboptimal nutrient intake (energy/protein) related to EN to advance to goal rate today, reliant since 1/31 with continued intubation, potential for interruptions to regimen.  Evaluation: Improving     CURRENT NUTRITION DIAGNOSIS  Increased nutrient needs (protein) related to wound healing and high-protein obesity guidelines as evidenced by wounds on buttocks and groin area, and BMI > 40.    INTERVENTIONS  Recommendations / Nutrition Prescription  -Increased water flushes to 60 mL q 2 hours d/t high Na level.  -Increased TF rate to 75 mL/hr (1800 ml) to provide 1800 kcal, 166 g protein, 1512 ml free water, 137 g CHO, and 7 g fiber daily.    Implementation  -EN Composition and Feeding Tube Flush: updated to match above.   -Collaboration and Referral of Care - discussed pt during IDT rounds.     Goals  EN to meet % daily needs while NPO.    MONITORING AND EVALUATION:  Progress towards goals will be monitored and evaluated per protocol and Practice Guidelines      Coby Lemons, Dietetic Intern

## 2019-02-07 LAB
ANION GAP SERPL CALCULATED.3IONS-SCNC: 3 MMOL/L (ref 3–14)
BUN SERPL-MCNC: 26 MG/DL (ref 7–30)
CALCIUM SERPL-MCNC: 7.5 MG/DL (ref 8.5–10.1)
CHLORIDE SERPL-SCNC: 107 MMOL/L (ref 94–109)
CO2 SERPL-SCNC: 32 MMOL/L (ref 20–32)
CREAT SERPL-MCNC: 0.57 MG/DL (ref 0.52–1.04)
ERYTHROCYTE [DISTWIDTH] IN BLOOD BY AUTOMATED COUNT: 17.7 % (ref 10–15)
GFR SERPL CREATININE-BSD FRML MDRD: >90 ML/MIN/{1.73_M2}
GLUCOSE BLDC GLUCOMTR-MCNC: 102 MG/DL (ref 70–99)
GLUCOSE BLDC GLUCOMTR-MCNC: 114 MG/DL (ref 70–99)
GLUCOSE BLDC GLUCOMTR-MCNC: 120 MG/DL (ref 70–99)
GLUCOSE BLDC GLUCOMTR-MCNC: 124 MG/DL (ref 70–99)
GLUCOSE BLDC GLUCOMTR-MCNC: 131 MG/DL (ref 70–99)
GLUCOSE BLDC GLUCOMTR-MCNC: 134 MG/DL (ref 70–99)
GLUCOSE SERPL-MCNC: 135 MG/DL (ref 70–99)
HCT VFR BLD AUTO: 31.7 % (ref 35–47)
HGB BLD-MCNC: 9.2 G/DL (ref 11.7–15.7)
MAGNESIUM SERPL-MCNC: 2 MG/DL (ref 1.6–2.3)
MCH RBC QN AUTO: 25.7 PG (ref 26.5–33)
MCHC RBC AUTO-ENTMCNC: 29 G/DL (ref 31.5–36.5)
MCV RBC AUTO: 89 FL (ref 78–100)
PHOSPHATE SERPL-MCNC: 3.1 MG/DL (ref 2.5–4.5)
PLATELET # BLD AUTO: 381 10E9/L (ref 150–450)
POTASSIUM SERPL-SCNC: 3.8 MMOL/L (ref 3.4–5.3)
RBC # BLD AUTO: 3.58 10E12/L (ref 3.8–5.2)
SODIUM SERPL-SCNC: 142 MMOL/L (ref 133–144)
VANCOMYCIN SERPL-MCNC: 5.4 MG/L
WBC # BLD AUTO: 19.1 10E9/L (ref 4–11)

## 2019-02-07 PROCEDURE — 80202 ASSAY OF VANCOMYCIN: CPT | Performed by: INTERNAL MEDICINE

## 2019-02-07 PROCEDURE — 25000128 H RX IP 250 OP 636: Performed by: SURGERY

## 2019-02-07 PROCEDURE — 36569 INSJ PICC 5 YR+ W/O IMAGING: CPT

## 2019-02-07 PROCEDURE — 94640 AIRWAY INHALATION TREATMENT: CPT

## 2019-02-07 PROCEDURE — 25000125 ZZHC RX 250: Performed by: SURGERY

## 2019-02-07 PROCEDURE — 25000132 ZZH RX MED GY IP 250 OP 250 PS 637: Performed by: INTERNAL MEDICINE

## 2019-02-07 PROCEDURE — 40000275 ZZH STATISTIC RCP TIME EA 10 MIN

## 2019-02-07 PROCEDURE — 83735 ASSAY OF MAGNESIUM: CPT | Performed by: INTERNAL MEDICINE

## 2019-02-07 PROCEDURE — 25000128 H RX IP 250 OP 636: Performed by: INTERNAL MEDICINE

## 2019-02-07 PROCEDURE — 94003 VENT MGMT INPAT SUBQ DAY: CPT

## 2019-02-07 PROCEDURE — 25000125 ZZHC RX 250: Performed by: INTERNAL MEDICINE

## 2019-02-07 PROCEDURE — 20000003 ZZH R&B ICU

## 2019-02-07 PROCEDURE — 80048 BASIC METABOLIC PNL TOTAL CA: CPT | Performed by: INTERNAL MEDICINE

## 2019-02-07 PROCEDURE — 99232 SBSQ HOSP IP/OBS MODERATE 35: CPT | Performed by: INTERNAL MEDICINE

## 2019-02-07 PROCEDURE — 84100 ASSAY OF PHOSPHORUS: CPT | Performed by: INTERNAL MEDICINE

## 2019-02-07 PROCEDURE — 99291 CRITICAL CARE FIRST HOUR: CPT | Performed by: INTERNAL MEDICINE

## 2019-02-07 PROCEDURE — 85027 COMPLETE CBC AUTOMATED: CPT | Performed by: INTERNAL MEDICINE

## 2019-02-07 PROCEDURE — 25000132 ZZH RX MED GY IP 250 OP 250 PS 637: Performed by: SURGERY

## 2019-02-07 PROCEDURE — 00000146 ZZHCL STATISTIC GLUCOSE BY METER IP

## 2019-02-07 PROCEDURE — 27210437 ZZH NUTRITION PRODUCT SEMIELEM INTERMED LITER

## 2019-02-07 PROCEDURE — 27210197 ZZH KIT POWER PICC TRIPLE LUMEN

## 2019-02-07 RX ORDER — OXYCODONE HCL 5 MG/5 ML
7.5 SOLUTION, ORAL ORAL EVERY 4 HOURS
Status: DISCONTINUED | OUTPATIENT
Start: 2019-02-07 | End: 2019-02-09

## 2019-02-07 RX ORDER — LIDOCAINE 40 MG/G
CREAM TOPICAL
Status: DISCONTINUED | OUTPATIENT
Start: 2019-02-07 | End: 2019-02-07 | Stop reason: CLARIF

## 2019-02-07 RX ORDER — GABAPENTIN 250 MG/5ML
600 SOLUTION ORAL 3 TIMES DAILY
Status: DISCONTINUED | OUTPATIENT
Start: 2019-02-07 | End: 2019-02-09

## 2019-02-07 RX ORDER — FUROSEMIDE 10 MG/ML
20 INJECTION INTRAMUSCULAR; INTRAVENOUS EVERY 8 HOURS
Status: DISCONTINUED | OUTPATIENT
Start: 2019-02-07 | End: 2019-02-07

## 2019-02-07 RX ORDER — FUROSEMIDE 10 MG/ML
20 INJECTION INTRAMUSCULAR; INTRAVENOUS ONCE
Status: COMPLETED | OUTPATIENT
Start: 2019-02-07 | End: 2019-02-07

## 2019-02-07 RX ORDER — HEPARIN SODIUM,PORCINE 10 UNIT/ML
2-5 VIAL (ML) INTRAVENOUS
Status: DISCONTINUED | OUTPATIENT
Start: 2019-02-07 | End: 2019-02-07 | Stop reason: CLARIF

## 2019-02-07 RX ADMIN — ASPIRIN 81 MG 162 MG: 81 TABLET ORAL at 08:04

## 2019-02-07 RX ADMIN — LEVALBUTEROL HYDROCHLORIDE 0.63 MG: 0.63 SOLUTION RESPIRATORY (INHALATION) at 07:33

## 2019-02-07 RX ADMIN — POTASSIUM CHLORIDE 20 MEQ: 1.5 POWDER, FOR SOLUTION ORAL at 06:37

## 2019-02-07 RX ADMIN — RANITIDINE HYDROCHLORIDE 150 MG: 15 SOLUTION ORAL at 08:07

## 2019-02-07 RX ADMIN — GABAPENTIN 600 MG: 250 SUSPENSION ORAL at 09:46

## 2019-02-07 RX ADMIN — MULTIVITAMIN 15 ML: LIQUID ORAL at 08:04

## 2019-02-07 RX ADMIN — BACLOFEN 10 MG: 10 TABLET ORAL at 21:39

## 2019-02-07 RX ADMIN — DEXMEDETOMIDINE 0.7 MCG/KG/HR: 100 INJECTION, SOLUTION, CONCENTRATE INTRAVENOUS at 03:29

## 2019-02-07 RX ADMIN — GABAPENTIN 600 MG: 250 SUSPENSION ORAL at 21:39

## 2019-02-07 RX ADMIN — OXYCODONE HYDROCHLORIDE 5 MG: 5 SOLUTION ORAL at 08:04

## 2019-02-07 RX ADMIN — TAZOBACTAM SODIUM AND PIPERACILLIN SODIUM 3.38 G: 375; 3 INJECTION, SOLUTION INTRAVENOUS at 15:45

## 2019-02-07 RX ADMIN — Medication 25 MCG/HR: at 11:27

## 2019-02-07 RX ADMIN — OXYCODONE HYDROCHLORIDE 7.5 MG: 5 SOLUTION ORAL at 11:42

## 2019-02-07 RX ADMIN — DEXMEDETOMIDINE 0.7 MCG/KG/HR: 100 INJECTION, SOLUTION, CONCENTRATE INTRAVENOUS at 13:26

## 2019-02-07 RX ADMIN — OXYCODONE HYDROCHLORIDE 5 MG: 5 SOLUTION ORAL at 04:04

## 2019-02-07 RX ADMIN — Medication 2 G: at 07:01

## 2019-02-07 RX ADMIN — GABAPENTIN 600 MG: 250 SUSPENSION ORAL at 15:45

## 2019-02-07 RX ADMIN — DEXMEDETOMIDINE 0.7 MCG/KG/HR: 100 INJECTION, SOLUTION, CONCENTRATE INTRAVENOUS at 18:59

## 2019-02-07 RX ADMIN — TAZOBACTAM SODIUM AND PIPERACILLIN SODIUM 3.38 G: 375; 3 INJECTION, SOLUTION INTRAVENOUS at 04:04

## 2019-02-07 RX ADMIN — FUROSEMIDE 20 MG: 10 INJECTION, SOLUTION INTRAVENOUS at 09:45

## 2019-02-07 RX ADMIN — TAZOBACTAM SODIUM AND PIPERACILLIN SODIUM 3.38 G: 375; 3 INJECTION, SOLUTION INTRAVENOUS at 09:50

## 2019-02-07 RX ADMIN — BACLOFEN 10 MG: 10 TABLET ORAL at 15:45

## 2019-02-07 RX ADMIN — TAZOBACTAM SODIUM AND PIPERACILLIN SODIUM 3.38 G: 375; 3 INJECTION, SOLUTION INTRAVENOUS at 21:40

## 2019-02-07 RX ADMIN — ATORVASTATIN CALCIUM 10 MG: 10 TABLET, FILM COATED ORAL at 08:05

## 2019-02-07 RX ADMIN — OXYCODONE HYDROCHLORIDE 7.5 MG: 5 SOLUTION ORAL at 21:38

## 2019-02-07 RX ADMIN — HEPARIN SODIUM 5000 UNITS: 5000 INJECTION, SOLUTION INTRAVENOUS; SUBCUTANEOUS at 15:45

## 2019-02-07 RX ADMIN — DEXMEDETOMIDINE 0.7 MCG/KG/HR: 100 INJECTION, SOLUTION, CONCENTRATE INTRAVENOUS at 08:08

## 2019-02-07 RX ADMIN — HEPARIN SODIUM 5000 UNITS: 5000 INJECTION, SOLUTION INTRAVENOUS; SUBCUTANEOUS at 08:07

## 2019-02-07 RX ADMIN — AMITRIPTYLINE HYDROCHLORIDE 100 MG: 50 TABLET, FILM COATED ORAL at 21:39

## 2019-02-07 RX ADMIN — AMIODARONE HYDROCHLORIDE 400 MG: 200 TABLET ORAL at 08:06

## 2019-02-07 RX ADMIN — RANITIDINE HYDROCHLORIDE 150 MG: 15 SOLUTION ORAL at 21:39

## 2019-02-07 RX ADMIN — OXYCODONE HYDROCHLORIDE 7.5 MG: 5 SOLUTION ORAL at 15:45

## 2019-02-07 RX ADMIN — SODIUM CHLORIDE, PRESERVATIVE FREE 5 ML: 5 INJECTION INTRAVENOUS at 09:49

## 2019-02-07 RX ADMIN — BACLOFEN 10 MG: 10 TABLET ORAL at 08:04

## 2019-02-07 ASSESSMENT — ACTIVITIES OF DAILY LIVING (ADL)
ADLS_ACUITY_SCORE: 32

## 2019-02-07 ASSESSMENT — MIFFLIN-ST. JEOR: SCORE: 1899

## 2019-02-07 NOTE — PROGRESS NOTES
Discharge Planner   Discharge Plans in progress: Estephania following for olya planning. Anticipate pt will need TCU as she is home alone during the day. Has H/O MS  Barriers to discharge plan: PT has a [employer] advantage plan and will require Prior auth  Follow up plan: Please order PT/OT when medically appropriate.        Entered by: Corinne C. White 02/07/2019 1:53 PM

## 2019-02-07 NOTE — PLAN OF CARE
ICU End of Shift Summary.  For vital signs and complete assessments, please see documentation flowsheets.     Pertinent assessments: Appears A/O TURCIOS. Vented. Lungs coarse with moderate secretions and coughing. Obese, Small BM, UOP adequate. Multiple skin and wound issues. PICC line.  Precedex and fentanyl drips  Major Shift Events: Weaned, sats dropped a bit with decreased peep. Large amounts of secretions this a.m. Lasix scheduled. Wound care done. PICC line placed  Plan (Upcoming Events): abx, wean from vent.  Discharge/Transfer Needs: unknown.    Bedside Shift Report Completed   Bedside Safety Check Completed

## 2019-02-07 NOTE — PLAN OF CARE
ICU End of Shift Summary.  For vital signs and complete assessments, please see documentation flowsheets.     Pertinent assessments: VSS. Alert & oriented. Tele: SR w/ BBB; HR 60s-80s. Vent 45%/550/14/8; lung sounds clear to coarse, oral care Q4hrs and PRN. Right internal jugular, infusing precedex @ 0.7, TKO, and fentanyl PCA @ 50mcg. RASS goal 0/-1, goal achieved. Ax2 to turn and reposition Q2hrs. C/o leg pain, scheduled pain meds given. Bobo in place with adequate output. TF running at 70ml/hr with Q4hr 60ml flush. Mepilex on coccyx and left buttocks, mepilex on left shin.     Major Shift Events: pt desated during shift RT called and sats recovered; pain in legs, scheduled pain meds given; breathing trial this AM  Plan (Upcoming Events): continue intermittent abx, wean off vent  Discharge/Transfer Needs: TBD    Bedside Shift Report Completed : yes  Bedside Safety Check Completed: yes

## 2019-02-07 NOTE — TELEPHONE ENCOUNTER
PA Initiation    Medication: Oxycodone 15mg - INITIATED  Insurance Company: [employer] - Phone [phone] Fax [phone]  Pharmacy Filling the Rx: [facility] [address] [place] [address] [place] [address] [address] [facility]  Filling Pharmacy Phone: [phone]  Filling Pharmacy Fax:    Start Date: 2/7/2019

## 2019-02-07 NOTE — PROGRESS NOTES
"Intensivist note  Patient was comfortable on vent; following commands    She tolerated having PEEP dropped from 8 to 6; she remains on FIO2 50%; hopefully ready for extubation in AM.    Assessment and Plan  1. Acute respiratory failure/ALI, and currently on 50% and +6 PEEP, due to septic shock, volume overload, and ALI. She has diuresed well over several days with slow improvement in oxygenation; Lasix resumed today and will continue to monitor.   2. Recent episode of Vtach- currently on amiodarone, due to acute physiologic stress, and will complete a 2 week course of therapy.   3. Septic shock, remains off pressors; due to E. Coli from urine/obstructed ureter  4. Obstructed left ureter,  with stent in place.  5. DM- glucoses ok  6. MS- barely ambulatory at her best  7. Right femoral neck fracture- discovered this admission incidentally  8. Acute Kidney injury- currently down to 0.57 with known baseline about 0.5. She appears euvolemic at present.   9. HTN  10. Dyslipidemia   11. Morbid obesity  12. History of T cell lymphoma treated in 2011  Overall, she remains critical but with continued progress.  I spent 30 minutes of critical care time at bedside.     Physical exam  Well developed female, NAD  /77   Pulse 68   Temp 98.6  F (37  C) (Oral)   Resp 14   Ht 1.626 m (5' 4\")   Wt 131.9 kg (290 lb 12.6 oz)   LMP 12/11/2011   SpO2 95%   BMI 49.91 kg/m    Lungs mainly clear today  Heart is RRR  Abdomen is soft and non-tender  Extremities are warm with mild edema; \"pruned out\"  Skin shows no cyanosis or mottling  CNS moves all extremities well to command; very weak RLE unchanged.    Labs reviewed    rufino strauss  February 7, 2019                "

## 2019-02-07 NOTE — PROGRESS NOTES
"Intensivist note  She was alert and interactive on vent.     She is down to 45% and +8 PEEP; slow progress over time.     Assessment and Plan  1. Acute respiratory failure/ALI, and currently on 45% and +8 PEEP, due to septic shock, volume overload, and ALI. She has diuresed well over several days with slow improvement in oxygenation; on hold at present with concern she is currently pre-renal.   2. Recent episode of Vtach- currently on amiodarone, due to acute physiologic stress, and will complete a 2 week course of therapy.   3. Septic shock, off pressors; due to E. Coli from urine/obstructed ureter  4. Obstructed left ureter,  with stent in place.  5. DM- glucoses ok  6. MS- barely ambulatory at her best  7. Right femoral neck fracture- discovered this admission incidentally  8. Acute Kidney injury- currently down to 0.73 with known baseline about 0.5. She may be on \"dry\" side at present.   9. HTN  10. Dyslipidemia   11. Morbid obesity  12. History of T cell lymphoma treated in 2011  Overall, she remains acute and critical but with slow, steady progress over time. 35 minutes of critical care time spent wth her today.     Physical exam  Well developed female NAD; alert on vent and easily follows commands  /68   Pulse 73   Temp 99.1  F (37.3  C) (Axillary)   Resp 12   Ht 1.626 m (5' 4\")   Wt 126.8 kg (279 lb 8.7 oz)   LMP 12/11/2011   SpO2 94%   BMI 47.98 kg/m    Lungs mainly clear today  Heart is RRR  Abdomen is soft and non-tender  Extremities are warm with mild edema  Skin shows no cyanosis or mottling; chronic stasis changes  CNS as above; weak in particular right leg (?chronic)    Labs reviewed    MD Rafael  February 6, 2018                        "

## 2019-02-07 NOTE — PLAN OF CARE
Right wrist and Left wrist restraints discontinued at 8:00 AM on 2/7/2019.    Restraint discontinue criteria met, patient is calm, cooperative and safe. Restraints removed.     Patient's Response: No evidence of learning  Family Notification: Other  Attending Physician Notified: Yes,      Hedy Dorado

## 2019-02-07 NOTE — PROGRESS NOTES
Respiratory Therapy  Patient remains intubated on mechanical ventilator with the following settings:    Ventilation Mode: CMV/AC  (Continuous Mandatory Ventilation/ Assist Control)  FiO2 (%): 45 %  Rate Set (breaths/minute): 14 breaths/min  Tidal Volume Set (mL): 550 mL  PEEP (cm H2O): 9 cmH2O  Oxygen Concentration (%): 45 %  Resp: 13    Temp: 98.1  F (36.7  C) Temp src: Axillary BP: 128/60 Pulse: 67 Heart Rate: 66 Resp: 13 SpO2: 91 % O2 Device: Mechanical Ventilator      BS diminished. Suctioned moderate amounts of creamy, thick secretions through the ETT. Will continue to monitor and support the patient's respiratory needs.    Ty Hinojosa, RT  2/7/2019 3:00 AM

## 2019-02-07 NOTE — PLAN OF CARE
ICU End of Shift Summary.  For vital signs and complete assessments, please see documentation flowsheets.      Pertinent assessments: alert and awake this morning. Follows commands. Remains on the vent. FIO2  45%, , Rate 14 and Peep of 8. Small creamy thick secretions suctioned through ETT and mouth. LS: dim. +BS. Incontinent  of stool (only smears). Tele: SR with BBB, Peaked T waves .Tolerated TF at 70 cc/hr. 60 CC of water flushes Q 2 wound care done. Bobo WDL in place, good UOP. No changes to skin. Right IJ intact. Fentanyl infusing at 50mcg/hr. COPT score( 0). Precedex gtt infusing at 0.7 mcg/kg/hr . RASS  score 0 to -1   Major Shift Events: no major events, turned Q 2 hours, Woc care is done. IV abx.  TF rate and flushes increased. Peep decreased to 8. Tolerated chair position in bed for  few hours today. Was able to watch TV and use the call light appropriately for help. WOC consulted. Stool softener given. Vancomycin D/c.  Plan (Upcoming Events): continue IV abx, monitor labs and Cultures, Continue pulmonary toileting, wean off FIO2 as able. Breathing trials in am  Discharge/Transfer Needs: continue ICU cares for now     Bedside Shift Report Completed : yes  Bedside Safety Check Completed: yes     Bilateral soft restraints continued 2/6/2019     Clinical Justification: Pulling lines, pulling tubes, and pulling equipment  Less Restrictive Alternative: Repositioning, Re-evaluate equipment, Disguise equipment, Pain management  Attending Physician Notified: yes  New orders placed: yes  Length of Order: 1 Day

## 2019-02-07 NOTE — TELEPHONE ENCOUNTER
Prior Authorization Approval    Authorization Effective Date: 2/7/2019  Authorization Expiration Date: 12/31/2019  Medication: Oxycodone 15mg - APPROVED  Approved Dose/Quantity: 120 for 30 days   Reference #:     Insurance Company: [email or web address] - Phone [phone] Fax [phone]  Expected CoPay:       CoPay Card Available:      Foundation Assistance Needed:    Which Pharmacy is filling the prescription (Not needed for infusion/clinic administered): [facility] [address] [place] [address] [place] AT [facility]  Pharmacy Notified: Yes  Patient Notified: Yes

## 2019-02-07 NOTE — PROVIDER NOTIFICATION
PICC Insertion Time-Out   Proceduralist prior to procedure:    Confirmed provider order for procedure    Verified appropriate supplies/equipment are available for procedure    Verified appropriate assessments have been completed     Prior to procedure the proceduralist instituted a 'Cease all activity' to confirm with a second nursing staff member the following:     Confirm patient identifiers using patient name and date of birth    Verify procedure to be performed    Verify consent was signed and witnessed    Verbalize any allergies    Verify code status     [Co-signature verification:  IV Access flowsheet > click any insertion column associated to a note > view Value Information)     Cordelia Carrero RN

## 2019-02-07 NOTE — PROGRESS NOTES
Respiratory Therapy Note        Pt did well on a PS trial 8/5 45%.  PS trial was stopped by ICU physician.  At times we have had some difficulty obtaining an accurate SpO2.  Secretions have been varied from none to moderate amounts of thick creamy white secretions.  Breath sounds have been diminished to rhonchi.    February 7, 2019 5:26 PM  Sahil Quispe

## 2019-02-07 NOTE — PROGRESS NOTES
RT- patient remains on full vent support. Breaths sounds diminished. Current vent settings:     Ventilation Mode: CMV/AC  (Continuous Mandatory Ventilation/ Assist Control)  FiO2 (%): 45 %  Rate Set (breaths/minute): 14 breaths/min  Tidal Volume Set (mL): 550 mL  PEEP (cm H2O): 8 cmH2O  Oxygen Concentration (%): 45 %  Resp: 12    BVM with Peep valve at the bedside. Patient getting scheduled Xopenex in line.     Peep weaned to +8 and orders updated.     Will continue to monitor patient.    Justina Davidson, RT  2/6/2019 7:23 PM

## 2019-02-08 ENCOUNTER — APPOINTMENT (OUTPATIENT)
Dept: PHYSICAL THERAPY | Facility: CLINIC | Age: 54
DRG: 853 | End: 2019-02-08
Attending: INTERNAL MEDICINE
Payer: COMMERCIAL

## 2019-02-08 LAB
ANION GAP SERPL CALCULATED.3IONS-SCNC: 5 MMOL/L (ref 3–14)
BACTERIA SPEC CULT: ABNORMAL
BUN SERPL-MCNC: 23 MG/DL (ref 7–30)
CALCIUM SERPL-MCNC: 7.7 MG/DL (ref 8.5–10.1)
CHLORIDE SERPL-SCNC: 106 MMOL/L (ref 94–109)
CO2 SERPL-SCNC: 31 MMOL/L (ref 20–32)
CREAT SERPL-MCNC: 0.57 MG/DL (ref 0.52–1.04)
ERYTHROCYTE [DISTWIDTH] IN BLOOD BY AUTOMATED COUNT: 17.6 % (ref 10–15)
GFR SERPL CREATININE-BSD FRML MDRD: >90 ML/MIN/{1.73_M2}
GLUCOSE BLDC GLUCOMTR-MCNC: 101 MG/DL (ref 70–99)
GLUCOSE BLDC GLUCOMTR-MCNC: 111 MG/DL (ref 70–99)
GLUCOSE BLDC GLUCOMTR-MCNC: 113 MG/DL (ref 70–99)
GLUCOSE BLDC GLUCOMTR-MCNC: 74 MG/DL (ref 70–99)
GLUCOSE BLDC GLUCOMTR-MCNC: 75 MG/DL (ref 70–99)
GLUCOSE BLDC GLUCOMTR-MCNC: 99 MG/DL (ref 70–99)
GLUCOSE SERPL-MCNC: 132 MG/DL (ref 70–99)
HCT VFR BLD AUTO: 32.2 % (ref 35–47)
HGB BLD-MCNC: 9.5 G/DL (ref 11.7–15.7)
Lab: ABNORMAL
MAGNESIUM SERPL-MCNC: 1.9 MG/DL (ref 1.6–2.3)
MAGNESIUM SERPL-MCNC: 2.1 MG/DL (ref 1.6–2.3)
MCH RBC QN AUTO: 25.7 PG (ref 26.5–33)
MCHC RBC AUTO-ENTMCNC: 29.5 G/DL (ref 31.5–36.5)
MCV RBC AUTO: 87 FL (ref 78–100)
PLATELET # BLD AUTO: 451 10E9/L (ref 150–450)
POTASSIUM SERPL-SCNC: 3.5 MMOL/L (ref 3.4–5.3)
POTASSIUM SERPL-SCNC: 3.7 MMOL/L (ref 3.4–5.3)
RBC # BLD AUTO: 3.69 10E12/L (ref 3.8–5.2)
SODIUM SERPL-SCNC: 142 MMOL/L (ref 133–144)
SPECIMEN SOURCE: ABNORMAL
WBC # BLD AUTO: 21.2 10E9/L (ref 4–11)

## 2019-02-08 PROCEDURE — 20000003 ZZH R&B ICU

## 2019-02-08 PROCEDURE — 40000275 ZZH STATISTIC RCP TIME EA 10 MIN

## 2019-02-08 PROCEDURE — 83735 ASSAY OF MAGNESIUM: CPT | Performed by: INTERNAL MEDICINE

## 2019-02-08 PROCEDURE — 97530 THERAPEUTIC ACTIVITIES: CPT | Mod: GP | Performed by: PHYSICAL THERAPIST

## 2019-02-08 PROCEDURE — 25000128 H RX IP 250 OP 636: Performed by: SURGERY

## 2019-02-08 PROCEDURE — 27210437 ZZH NUTRITION PRODUCT SEMIELEM INTERMED LITER

## 2019-02-08 PROCEDURE — 25000132 ZZH RX MED GY IP 250 OP 250 PS 637: Performed by: SURGERY

## 2019-02-08 PROCEDURE — 25000128 H RX IP 250 OP 636: Performed by: INTERNAL MEDICINE

## 2019-02-08 PROCEDURE — 85027 COMPLETE CBC AUTOMATED: CPT | Performed by: INTERNAL MEDICINE

## 2019-02-08 PROCEDURE — 40000193 ZZH STATISTIC PT WARD VISIT: Performed by: PHYSICAL THERAPIST

## 2019-02-08 PROCEDURE — 99233 SBSQ HOSP IP/OBS HIGH 50: CPT | Performed by: INTERNAL MEDICINE

## 2019-02-08 PROCEDURE — 25000125 ZZHC RX 250: Performed by: SURGERY

## 2019-02-08 PROCEDURE — 25000132 ZZH RX MED GY IP 250 OP 250 PS 637: Performed by: INTERNAL MEDICINE

## 2019-02-08 PROCEDURE — 80048 BASIC METABOLIC PNL TOTAL CA: CPT | Performed by: INTERNAL MEDICINE

## 2019-02-08 PROCEDURE — 94003 VENT MGMT INPAT SUBQ DAY: CPT

## 2019-02-08 PROCEDURE — G0463 HOSPITAL OUTPT CLINIC VISIT: HCPCS | Mod: 25

## 2019-02-08 PROCEDURE — 97110 THERAPEUTIC EXERCISES: CPT | Mod: GP | Performed by: PHYSICAL THERAPIST

## 2019-02-08 PROCEDURE — 99291 CRITICAL CARE FIRST HOUR: CPT | Performed by: INTERNAL MEDICINE

## 2019-02-08 PROCEDURE — 97602 WOUND(S) CARE NON-SELECTIVE: CPT

## 2019-02-08 PROCEDURE — 97163 PT EVAL HIGH COMPLEX 45 MIN: CPT | Mod: GP | Performed by: PHYSICAL THERAPIST

## 2019-02-08 PROCEDURE — 00000146 ZZHCL STATISTIC GLUCOSE BY METER IP

## 2019-02-08 PROCEDURE — 84132 ASSAY OF SERUM POTASSIUM: CPT | Performed by: INTERNAL MEDICINE

## 2019-02-08 RX ORDER — PROPOFOL 10 MG/ML
5-75 INJECTION, EMULSION INTRAVENOUS CONTINUOUS
Status: DISCONTINUED | OUTPATIENT
Start: 2019-02-08 | End: 2019-02-08

## 2019-02-08 RX ORDER — HYDROCHLOROTHIAZIDE 12.5 MG/1
12.5 CAPSULE ORAL DAILY
Status: DISCONTINUED | OUTPATIENT
Start: 2019-02-09 | End: 2019-02-09

## 2019-02-08 RX ORDER — LOSARTAN POTASSIUM 50 MG/1
50 TABLET ORAL DAILY
Status: DISCONTINUED | OUTPATIENT
Start: 2019-02-08 | End: 2019-02-09

## 2019-02-08 RX ORDER — LABETALOL 20 MG/4 ML (5 MG/ML) INTRAVENOUS SYRINGE
10-20 EVERY 4 HOURS PRN
Status: DISCONTINUED | OUTPATIENT
Start: 2019-02-08 | End: 2019-02-09

## 2019-02-08 RX ADMIN — TAZOBACTAM SODIUM AND PIPERACILLIN SODIUM 3.38 G: 375; 3 INJECTION, SOLUTION INTRAVENOUS at 21:11

## 2019-02-08 RX ADMIN — OXYCODONE HYDROCHLORIDE 7.5 MG: 5 SOLUTION ORAL at 12:02

## 2019-02-08 RX ADMIN — POTASSIUM CHLORIDE 20 MEQ: 29.8 INJECTION, SOLUTION INTRAVENOUS at 11:27

## 2019-02-08 RX ADMIN — LABETALOL 20 MG/4 ML (5 MG/ML) INTRAVENOUS SYRINGE 10 MG: at 19:58

## 2019-02-08 RX ADMIN — POTASSIUM CHLORIDE 20 MEQ: 1.5 POWDER, FOR SOLUTION ORAL at 02:55

## 2019-02-08 RX ADMIN — HEPARIN SODIUM 5000 UNITS: 5000 INJECTION, SOLUTION INTRAVENOUS; SUBCUTANEOUS at 17:18

## 2019-02-08 RX ADMIN — BACLOFEN 10 MG: 10 TABLET ORAL at 08:15

## 2019-02-08 RX ADMIN — Medication 2 G: at 04:47

## 2019-02-08 RX ADMIN — GABAPENTIN 600 MG: 250 SUSPENSION ORAL at 21:12

## 2019-02-08 RX ADMIN — OXYCODONE HYDROCHLORIDE 7.5 MG: 5 SOLUTION ORAL at 00:19

## 2019-02-08 RX ADMIN — OXYCODONE HYDROCHLORIDE 7.5 MG: 5 SOLUTION ORAL at 17:18

## 2019-02-08 RX ADMIN — AMITRIPTYLINE HYDROCHLORIDE 100 MG: 50 TABLET, FILM COATED ORAL at 21:17

## 2019-02-08 RX ADMIN — BACLOFEN 10 MG: 10 TABLET ORAL at 21:17

## 2019-02-08 RX ADMIN — OXYCODONE HYDROCHLORIDE 7.5 MG: 5 SOLUTION ORAL at 21:11

## 2019-02-08 RX ADMIN — GABAPENTIN 600 MG: 250 SUSPENSION ORAL at 08:15

## 2019-02-08 RX ADMIN — RANITIDINE HYDROCHLORIDE 150 MG: 15 SOLUTION ORAL at 08:16

## 2019-02-08 RX ADMIN — MULTIVITAMIN 15 ML: LIQUID ORAL at 08:15

## 2019-02-08 RX ADMIN — HEPARIN SODIUM 5000 UNITS: 5000 INJECTION, SOLUTION INTRAVENOUS; SUBCUTANEOUS at 00:19

## 2019-02-08 RX ADMIN — Medication 25 MG: at 21:12

## 2019-02-08 RX ADMIN — TAZOBACTAM SODIUM AND PIPERACILLIN SODIUM 3.38 G: 375; 3 INJECTION, SOLUTION INTRAVENOUS at 10:09

## 2019-02-08 RX ADMIN — LOSARTAN POTASSIUM 50 MG: 50 TABLET ORAL at 21:17

## 2019-02-08 RX ADMIN — TAZOBACTAM SODIUM AND PIPERACILLIN SODIUM 3.38 G: 375; 3 INJECTION, SOLUTION INTRAVENOUS at 17:17

## 2019-02-08 RX ADMIN — OXYCODONE HYDROCHLORIDE 7.5 MG: 5 SOLUTION ORAL at 03:44

## 2019-02-08 RX ADMIN — RANITIDINE HYDROCHLORIDE 150 MG: 15 SOLUTION ORAL at 21:12

## 2019-02-08 RX ADMIN — TAZOBACTAM SODIUM AND PIPERACILLIN SODIUM 3.38 G: 375; 3 INJECTION, SOLUTION INTRAVENOUS at 03:43

## 2019-02-08 RX ADMIN — ATORVASTATIN CALCIUM 10 MG: 10 TABLET, FILM COATED ORAL at 08:15

## 2019-02-08 RX ADMIN — OXYCODONE HYDROCHLORIDE 7.5 MG: 5 SOLUTION ORAL at 08:15

## 2019-02-08 RX ADMIN — ASPIRIN 81 MG 162 MG: 81 TABLET ORAL at 08:15

## 2019-02-08 RX ADMIN — HEPARIN SODIUM 5000 UNITS: 5000 INJECTION, SOLUTION INTRAVENOUS; SUBCUTANEOUS at 08:15

## 2019-02-08 RX ADMIN — GABAPENTIN 600 MG: 250 SUSPENSION ORAL at 17:17

## 2019-02-08 RX ADMIN — DEXMEDETOMIDINE 0.7 MCG/KG/HR: 100 INJECTION, SOLUTION, CONCENTRATE INTRAVENOUS at 00:15

## 2019-02-08 RX ADMIN — AMIODARONE HYDROCHLORIDE 400 MG: 200 TABLET ORAL at 08:15

## 2019-02-08 RX ADMIN — BACLOFEN 10 MG: 10 TABLET ORAL at 17:18

## 2019-02-08 ASSESSMENT — ACTIVITIES OF DAILY LIVING (ADL)
ADLS_ACUITY_SCORE: 29
ADLS_ACUITY_SCORE: 31
ADLS_ACUITY_SCORE: 31
ADLS_ACUITY_SCORE: 32
ADLS_ACUITY_SCORE: 31
ADLS_ACUITY_SCORE: 31

## 2019-02-08 NOTE — PLAN OF CARE
OT: Orders received, chart reviewed and pt discussed in rounds. Pt admitted with septic shock. Plan for extubation today. OT to hold today, per discussion with MD in rounds. PT to initiate and OT will continue to follow

## 2019-02-08 NOTE — PROGRESS NOTES
Respiratory Therapy Note        Pt was extubated post PS trial to an oxymask 15 Lpm.  Her initial SpO2's were in the upper 80's but have been steadily rising and currently 98%.  She has a good strong cough and has been instructed in the use of an IS.  No SOB, breathing is comfortable at a RR 15.    February 8, 2019 11:06 AM  Sahil Quispe

## 2019-02-08 NOTE — PLAN OF CARE
Discharge Planner PT   PT yumiko completed.  Amanda Richardson is a 53 year old female with past medical history significant for advanced MS, diabetes mellitus, hypertension, history of T-cell lymphoma status post treatment, chronically elevated WBC count, history of recurrent UTIs who presented with altered mental status increased weakness and somnolence.  Evaluation in the emergency room showed significant leukocytosis, hypotension, acute renal failure, obstructing left kidney stone with moderate hydronephrosis and respiratory failure with acidosis with hypercapnia.  She was admitted on 1/30/2019 with septic shock.  Patient received IV antibiotics and fluid resuscitation in the emergency room and underwent emergent cystoscopy and left retrograde pyelogram with stent placement on 1/30/2019.  CT also showed closed fracture of right femur.  She was initially placed on BiPAP for respiratory failure but perioperatively was intubated. she was admitted to ICU for further evaluation.  Pt lives in a split level home with a ramp to enter and a chair lift to main living area.  Pt was I with all transfers from her manual WC to commode, bed and recliner.   Patient plan for discharge: TCU  Current status: Pt dependent with all mobility.  Ceiling lift used by nsg to transfer from bed > chair.  Attempted stance at magan steady x 3 trials with Max A x 2. Pt able to lift buttocks off recliner more with each trial with good use of UEs to pull but unable to extend hips and knees to come to partial stance to sit on paddles of Magan steady.  Pt with trace strength in R LE.    Barriers to return to prior living situation: current level of A  Recommendations for discharge: TCU  Rationale for recommendations: Pt significantly below baseline with transfers and bed mobility due to prolonged bedrest during intubation.  Continued skilled Pt (and OT) in the TCU setting to increase overall strength and ROM, progress transfers and activity  tolerance to enable patient to return home at prior level of function.       Entered by: Debra Crandall 02/08/2019 5:13 PM

## 2019-02-08 NOTE — PROGRESS NOTES
[facility]    Hospitalist Progress Note  Name: Amanda Richardson    MRN: [id number]  Provider: Hermilo Yarbrough MD  Date of Service: 02/07/2019    Assessment & Plan   Summary of Stay: Amanda Richardson is a 53 year old female with past medical history significant for advanced MS, diabetes mellitus, hypertension, history of T-cell lymphoma status post treatment, chronically elevated WBC count, history of recurrent UTIs who presented with altered mental status increased weakness and somnolence.  Evaluation in the emergency room showed significant leukocytosis, hypotension, acute renal failure, obstructing left kidney stone with moderate hydronephrosis and respiratory failure with acidosis with hypercapnia.  She was admitted was admitted on 1/30/2019 with septic shock.  Patient received IV antibiotics and fluid resuscitation in the emergency room and underwent emergent cystoscopy and left retrograde pyelogram with stent placement on 1/30/2019.  CT also showed closed fracture of right femur.  She was initially placed on BiPAP for respiratory failure but perioperatively was intubated. she was admitted to ICU for further evaluation.    1/31/2019 patient developed wide-complex tachycardia appeared to be SVT failed to respond to vasovagal maneuvers.  Adenosine was given which showed rhythm to be atrial flutter she received cardioversion and was started on amiodarone.  Angiotensin II drip was initiated  briefly for hypotension.  She is now off pressors.  Continues to be on Precedex.      Continues improved. Doing well with pressure support trials. Alert and following directions.       Septic shock likely due to urinary tract source (altered mental status, acute renal failure, hypotension, leukocytosis, acidosis, respiratory failure)  --CT scan on admission showed obstructing stone with hydronephrosis and acute renal failure  --Likely secondary to infected kidney stone  --Status post cystoscopy and stent placement  1/30/2019.  Per report patient did have purulent drainage noted during cystoscopy.  --Fluid resuscitation in the emergency room  --IV antibiotic started zosyn to cover for possible E. coli infection.  She also received a azithromycin for possible aspiration pneumonia as noted on CT  --Blood and urine cultures positive for E coli  --Off pressors  --Continues to be on Precedex.  --Remains intubated, expected extubation tomorrow.      Hypercapnic respiratory failure  --Likely multifactorial from sepsis and chronic narcotic use  --Was intubated perioperatively  --has remained intubated until now, but is likely to be extubated tomorrow, 2/8      Acute renal failure with left hydronephrosis: Resolved now at baseline  --Secondary to obstructive uropathy.  Creatinine on admission 2.25 baseline 1  --Status post a stent placement  --We will monitor renal functions closely  --Avoid nephrotoxins  --Urology consulted and following    New Atrial flutter provoked by urosepsis  --Status post cardioversion 1/31/2019  --Appreciate cardiology consult  --Cardiac echo when compared to prior study showed minimal deterioration of left ventricular systolic function.  EF 50-55%  --No anticoagulation for now per cardiology patient was in atrial flutter for less than 8 hours  --Switched to oral amiodarone for a total of 2 weeks    Right femoral neck fracture  --Incidental finding on CT  --Patient has MS at baseline is able to pivot herself  --No pain noted on initial exam  --Orthopedic surgery was consulted.  No intervention recommended at this time given evidence of acute infection    Type 2 diabetes mellitus  --At baseline on metformin will hold for now  --Sliding scale insulin    MS with associated chronic pain  --Prior to admission on amitriptyline, baclofen, duloxetine, gabapentin and oxycodone  --PRN pain control  --PT evaluation  --Will need pain team consultation once extubated    Significant leukocytosis: Improving  --Patient has  history of T-cell non-Hodgkin's lymphoma status post treatment at baseline white cell count is in the mid teens  --Significant initial elevation likely due to sepsis    H/o Hypertension  --Was hypotensive on presentation  --Held losartan and hydrochlorothiazide    Bilateral inguinal wounds  --On admission concern for suppurative hydradenitis  --Wound culture sent  --1 dose of IV Vanco given for possible MRSA on admission    Blood culture (1/1) positive with S epi identified.   --Likely contaminant. Stop Vanco.       DVT Prophylaxis: Pneumatic Compression Devices  Code Status: Full Code    Disposition: To be decided intubated      Interval History   Chart reviewed, pt interviewed.    Overall dramatically improved. Very interactive and appropriate.     -Data reviewed today: I reviewed all new labs and imaging reports over the last 24 hours. I personally reviewed no images or EKG's today.    Physical Exam   Temp: 98.6  F (37  C) Temp src: Oral BP: 147/64 Pulse: 77 Heart Rate: 78 Resp: 13 SpO2: 92 % O2 Device: Mechanical Ventilator    Vitals:    02/05/19 0000 02/06/19 0600 02/07/19 0635   Weight: 125.9 kg (277 lb 9 oz) 126.8 kg (279 lb 8.7 oz) 131.9 kg (290 lb 12.6 oz)     Vital Signs with Ranges  Temp:  [98.1  F (36.7  C)-98.6  F (37  C)] 98.6  F (37  C)  Pulse:  [58-79] 77  Heart Rate:  [62-85] 78  Resp:  [8-39] 13  BP: (119-159)/(56-87) 147/64  FiO2 (%):  [40 %-50 %] 50 %  SpO2:  [91 %-99 %] 92 %  I/O last 3 completed shifts:  In: 3275.4 [I.V.:1385.4; NG/GT:910]  Out: 2975 [Urine:2975]      GEN: Intubated and sedated NAD.  HEENT:  Normocephalic/atraumatic, no scleral icterus, no nasal discharge, mouth dry  CV: Tachycardic no murmur or JVD.  S1 + S2 noted, no S3 or S4.  LUNGSleft basilar crackles.  Symmetric chest rise on inhalation noted.  ABD:  Active bowel sounds, soft, non-tender/non-distended.  No rebound/guarding/rigidity.  Bilateral inguinal wounds ?  hidradenitis  EXT: + edema.  No cyanosis.    SKIN:  Dry to  touch, chronic stasis dermatitis and bilateral inguinal hidradenitis    Medications     dexmedetomidine 0.7 mcg/kg/hr (02/07/19 1859)     IV fluid REPLACEMENT ONLY       fentanyl 25 mcg/hr (02/07/19 1800)     Patient RECEIVING antibiotic to treat a different condition and it provides ADEQUATE COVERAGE for this surgical procedure.       sodium chloride 10 mL/hr at 02/07/19 0627     sodium chloride 10 mL/hr at 02/07/19 0627       [START ON 2/10/2019] amiodarone  200 mg Oral Daily     amiodarone  400 mg Oral Daily     amitriptyline  100 mg Oral or Feeding Tube At Bedtime     aspirin  162 mg Oral or Feeding Tube Daily     atorvastatin  10 mg Oral or Feeding Tube Daily     baclofen  10 mg Oral or Feeding Tube TID     gabapentin  600 mg Oral or Feeding Tube TID     heparin lock flush  5-10 mL Intracatheter Q24H     heparin  5,000 Units Subcutaneous Q8H     insulin aspart  1-6 Units Subcutaneous Q4H     multivitamins w/minerals  15 mL Per Feeding Tube Daily     oxyCODONE  7.5 mg Oral or Feeding Tube Q4H     piperacillin-tazobactam  3.375 g Intravenous Q6H     rantidine  150 mg Oral or Feeding Tube BID     sodium chloride (PF)  10 mL Intracatheter Q7 Days     Data     No results for input(s): PH, PHV, PO2, PO2V, SAT, PCO2, PCO2V, HCO3, HCO3V in the last 168 hours.  Recent Labs   Lab 02/07/19  0550 02/06/19  0515 02/05/19  0420   WBC 19.1* 19.2* 21.7*   HGB 9.2* 9.2* 9.8*   HCT 31.7* 31.1* 32.1*   MCV 89 87 84    346 347     Recent Labs   Lab 02/07/19  0550 02/06/19  0515 02/05/19  0420    145* 144   POTASSIUM 3.8 4.0 3.5   CHLORIDE 107 107 105   CO2 32 36* 34*   ANIONGAP 3 2* 5   * 136* 143*   BUN 26 29 32*   CR 0.57 0.73 0.90   GFRESTIMATED >90 >90 71   GFRESTBLACK >90 >90 82   MARY 7.5* 7.6* 7.3*     Recent Labs   Lab 02/04/19  2130   CULT Cultured on the 2nd day of incubation:  Staphylococcus epidermidis  *  Critical Value/Significant Value, preliminary result only, called to and read back  by  Claudia Marsh RN [id number] 2/6/19. MS    (Note)  POSITIVE for STAPHYLOCOCCUS EPIDERMIDIS and POSITIVE for the mecA  gene (resistant to methicillin) by [employer] multiplex nucleic acid  test. Final identification and antimicrobial susceptibility testing  will be verified by standard methods.      Specimen tested with Verigene multiplex, gram-positive blood culture  nucleic acid test for the following targets: Staph aureus, Staph  epidermidis, Staph lugdunensis, other Staph species, Enterococcus  faecalis, Enterococcus faecium, Streptococcus species, S. agalactiae,  S. anginosus grp., S. pneumoniae, S. pyogenes, Listeria sp., mecA  (methicillin resistance) and Randolph/B (vancomycin resistance).      Critical Value/Significant Value called to and read back by FREDO ESPINO 02.06.2019 AT 7.43AM,ZG    No growth     Recent Labs   Lab 02/07/19  0550 02/06/19  0515 02/05/19  0420   HGB 9.2* 9.2* 9.8*     No results for input(s): AST, ALT, GGT, ALKPHOS, BILITOTAL, BILICONJ, BILIDIRECT, ABY in the last 168 hours.    Invalid input(s): BILIRUBININDIRECT  No results for input(s): INR in the last 168 hours.  No results for input(s): LACT in the last 168 hours.  No results for input(s): COLOR, APPEARANCE, URINEGLC, URINEBILI, URINEKETONE, SG, UBLD, URINEPH, PROTEIN, UROBILINOGEN, NITRITE, LEUKEST, RBCU, WBCU in the last 168 hours.    No results found for this or any previous visit (from the past 24 hour(s)).

## 2019-02-08 NOTE — PLAN OF CARE
ICU End of Shift Summary.  For vital signs and complete assessments, please see documentation flowsheets.     Pertinent assessments: Please see flowsheets.  Major Shift Events: Precedex gtt titrated down overnight. Low dose this am with vent wean trial. Suctioned medium amounts of thick yellow cloudy secretions from ETT. Unable to titrate FiO2 down from 50%. Mag replaced overnight. K+ replaced overnight. Wound care completed per orders. Turned and repo q2 left to right turns. Full bath complete.   Plan (Upcoming Events): Extubate. PT OT?  Discharge/Transfer Needs: TBD    Bedside Shift Report Completed : y  Bedside Safety Check Completed:y

## 2019-02-08 NOTE — PROGRESS NOTES
Respiratory Therapy  Patient remains intubated on mechanical ventilator with the following settings:    Ventilation Mode: CMV/AC  (Continuous Mandatory Ventilation/ Assist Control)  FiO2 (%): 50 %  Rate Set (breaths/minute): 14 breaths/min  Tidal Volume Set (mL): 550 mL  PEEP (cm H2O): 6 cmH2O  Pressure Support (cm H2O): 12 cmH2O  Oxygen Concentration (%): 50 %  Resp: 13    Temp: 98.6  F (37  C) Temp src: Axillary BP: 139/71 Pulse: 80 Heart Rate: 78 Resp: 13 SpO2: 95 % O2 Device: BiPAP/CPAP      BS diminished. Suctioned small amounts of thick, cloudy secretions through the ETT. Will continue to monitor and support the patient's respiratory needs.    Ty Hinojosa, RT  2/8/2019 1:41 AM

## 2019-02-08 NOTE — PROVIDER NOTIFICATION
Patient has 2-second pause with heart rate dipping into low 40's from a steady 60's to 70's all shift. Patient is on Precedex gtt. No report of previous pauses. K+3.8, was replaced earlier today.Tele-ICU notified. Ordered to recheck electrolytes.     Addendum:     K+ recheck 3.5, replaced with 20mEq per protocol, recheck in am.  Mag recheck 1.9, replace ordered from pharmacy per protocol, recheck in am.

## 2019-02-08 NOTE — PROGRESS NOTES
"[facility] Nurse Inpatient Wound Assessment     Assessment of wound(s) on pt's:   Bilateral groin folds/ Buttocks        Data:   Patient History:      per MD note(s):  53 year old woman with advanced MS (able to pivot) who resides at home with her .  She was brought to the ED by EMS due to weakness and apparent increased somnolence. He recognized that she probably had a UTI, \"because this is how she gets with those\".    See MD note for complete history.      Moisture Management:  Urinary Catheter    Catheter secured? Yes  Current Diet / Nutrition:   Orders Placed This Encounter      NPO per Anesthesia Guidelines for Procedure/Surgery Except for: Meds              Issac Assessment and sub scores:   Issac Risk Assessment Issac Risk Assessment    Sensory Perception: 3-->slightly limited    Moisture: 3-->occasionally moist   Activity: 1-->bedfast     Mobility: 2-->very limited   Nutrition: 3-->adequate   Issac Score: 13       Mattress:  Standard , Evolve  Labs:     Recent Labs   Lab Test 02/08/19  0605  02/04/19  0545  01/30/19  1258  10/03/13  1614   ALBUMIN  --   --   --   --  2.1*   < > 3.6*   HGB 9.5*   < > 10.3*   < > 12.9   < > 12.7   INR  --   --   --   --   --   --  0.93   WBC 21.2*   < > 21.6*   < > 47.7*   < > 14.4*   A1C  --   --   --   --  6.4*   < >  --    CRP  --   --  57.7*  --   --    < >  --     < > = values in this interval not displayed.        Wound Assessment (location ):   Bilateral groin skin folds  Wound History:  Suspected fungal infection with Hydradenitis, skin bridges no tunneling noted    Specific Dimensions (length x width x depth, in cm):       Right Groin: entire area: 3 small areas with skin between each about 2x 0.3x 0.1cm moist red dermis;  scant bloody drainage,     Left Groin: 6 x 0.3 x 0.1cm with multiple skin bridges consistent with chronic healing and reopening wound base, scant bloody drainage, wound base moist pink/ red dermis    Periwound Skin: no " rash or erythema, improved fungal condition    Odor: none    Pain: no complaint    Pressure Wound Assessment:   Bilateral buttocks- fleshy area    Wound History:   See above, new consult    Specific Dimensions (length x width x depth, in cm) :  Very superficial erosion across buttocks, with 0.2 x 0.2 x 0.1cm moist red tissue to right buttocks.      Periwound Skin: intact,      Color: normal and consistent with surrounding tissue    Temperature  normal    Drainage:  Small dot of bloody drainage after Mepilex removal    Pain: no complaint    Unsure if related to pressure since the area is not directly over a bony prominence, skin issue improved, healing and stable. Appears consistent with friction; moisture erosion due to high temps          Intervention:     Patient's chart evaluated.      Wound(s) was assessed    Wound Care: was done:  Per POC    Orders  Written and updated    Supplies  ordered: per POC and discussed with RN    Discussed plan of care with Nurse and pt          Assessment:       Healed  fungal infection; chronic hydradenitis skin condition; wounds with multiple skin bridges prove chronicity but not tunneling wounds. Continue with Triad alone, please DO NOT use any Moist gauze or excessive paste.  Bilateral buttocks noted small pin dot of open skin due to foam dressing removal. Suspect wound due to friction and moisture issues with high temps, skin greatly improved and healing. Will plan to use small amount of Triad paste to buttocks as well. All skin blanchable.         Plan:     Nursing to notify the Provider(s) and re-consult the [facility] Nurse if wound(s) deteriorate(s) or if the wound care plan needs reevaluation.    Plan of care for wound located on bilateral groin: Daily and PRN1. Wash wounds with wound spray and pat dry    2. Wash periwound with Tereza and soft cloth    3. Apply very small amount of Triad paste to OPEN wounds    6. DO NOT USE MOIST GAUZE to wounds        Wound care to Buttocks:BID  and PRN    1. Gently clean skin with Tereza. Apply Light layer of Triad paste or Critic aid paste to buttocks.     2. Avoid Mepilex    3. Pressure Prevention: turn side to side, avoid pressure points, heel lift boots, Sween to dry skin    WOC Nurse will return: weekly and PRN

## 2019-02-08 NOTE — PROGRESS NOTES
" 02/08/19 1500   Quick Adds   Type of Visit Initial PT Evaluation   Living Environment   Lives With child(heidi), adult;spouse   Living Arrangements house   Home Accessibility wheelchair accessible;other (see comments)  (chair lift)   Transportation Anticipated family or friend will provide   Living Environment Comment Pt lives in a split level home with a ramp to enter and stair lift to main level.     Self-Care   Usual Activity Tolerance moderate   Current Activity Tolerance fair   Regular Exercise Other (see comments)  (pt has done some stretching in the past)   Equipment Currently Used at Home commode;grab bar, tub/shower;lift device;shower chair;wheelchair, manual   Activity/Exercise/Self-Care Comment Pt uses a commode, walk in shower with shower chair, bed rails.  Pt spends the daniel in her manual WC or recliner.  Pt was I with squat pivot transfers from WC <> recliner or commode.  Pt reports also having a lift (vicki?) with a sling in case she needs it.  Pt home alone most of day until 3:30.  Pt does most of the cooking, cleaning, laundry with A from [place] and .   Functional Level Prior   Ambulation 4-->completely dependent   Transferring 1-->assistive equipment   Toileting 1-->assistive equipment   Bathing 3-->assistive equipment and person   Communication 0-->understands/communicates without difficulty   Swallowing 0-->swallows foods/liquids without difficulty   Cognition 0 - no cognition issues reported   Fall history within last six months yes   Number of times patient has fallen within last six months 2   Which of the above functional risks had a recent onset or change? transferring;fall history   Prior Functional Level Comment Pt reports she has fallen out of her chair   General Information   Onset of Illness/Injury or Date of Surgery - Date 01/30/19   Referring Physician Hermilo Yarbrough   Patient/Family Goals Statement \"To do an interim care\"     Pertinent History of Current Problem (include personal " factors and/or comorbidities that impact the POC) Amanda Richardson is a 53 year old female with past medical history significant for advanced MS, diabetes mellitus, hypertension, history of T-cell lymphoma status post treatment, chronically elevated WBC count, history of recurrent UTIs who presented with altered mental status increased weakness and somnolence.  Evaluation in the emergency room showed significant leukocytosis, hypotension, acute renal failure, obstructing left kidney stone with moderate hydronephrosis and respiratory failure with acidosis with hypercapnia.  She was admitted was admitted on 1/30/2019 with septic shock.  Patient received IV antibiotics and fluid resuscitation in the emergency room and underwent emergent cystoscopy and left retrograde pyelogram with stent placement on 1/30/2019.  CT also showed closed fracture of right femur.  She was initially placed on BiPAP for respiratory failure but perioperatively was intubated. she was admitted to ICU for further evaluation.   Precautions/Limitations fall precautions;oxygen therapy device and L/min   Weight-Bearing Status - LLE full weight-bearing   Weight-Bearing Status - RLE weight-bearing as tolerated   Heart Disease Risk Factors Diabetes;High blood pressure;Lack of physical activity;Overweight;Medical history   General Observations Pt sitting up in recliner   Cognitive Status Examination   Orientation orientation to person, place and time   Level of Consciousness alert   Follows Commands and Answers Questions 100% of the time;able to follow single-step instructions   Memory intact   Pain Assessment   Patient Currently in Pain Yes, see Vital Sign flowsheet  (7/10, back and R hip)   Integumentary/Edema   Integumentary/Edema Comments B LE edema and redness,feet and lower legs   Posture    Posture Forward head position;Protracted shoulders   Range of Motion (ROM)   ROM Comment tight gastrocs and hamstrings (R more than L), limited R shld flex  "to ~ 160 deg AAROM.    Strength   Strength Comments R hip flex 1/5, knee ext 3-/5, ankle DF 2-/5, L hip flex 4/5, knee ext and ankle DF 5/5. B shld flex 3+/5, B elbow flex/ext 4+/5, B wrist ext 5/5   Bed Mobility   Bed Mobility Comments TBA   Transfer Skills   Transfer Comments dependent   Gait   Gait Comments unable   Balance   Balance Comments TBA   Sensory Examination   Sensory Perception Comments Pt reports numbness in B LEs.     General Therapy Interventions   Planned Therapy Interventions bed mobility training;ROM;strengthening;stretching;transfer training;risk factor education;home program guidelines;progressive activity/exercise   Clinical Impression   Criteria for Skilled Therapeutic Intervention yes, treatment indicated   PT Diagnosis Decreased functional mobility   Influenced by the following impairments Deconditioned, decreased strength all 4 extremities and core, R hip and back pain, decreased activity tolerance, impaired balance, decreased ROM B LEs   Functional limitations due to impairments Nonambulatory, assisted transfers, increased falls risk, unable to return home at OF   Clinical Presentation Evolving/Changing   Clinical Presentation Rationale multiple comorbidities, decline in function from prolonged bedrest   Clinical Decision Making (Complexity) High complexity   Therapy Frequency` daily   Predicted Duration of Therapy Intervention (days/wks) 7 days   Anticipated Discharge Disposition Transitional Care Facility   Risk & Benefits of therapy have been explained Yes   Patient, Family & other staff in agreement with plan of care Yes   [facility] TM \"6 Clicks\"   2016, Trustees of [facility], under license to [employer].  All rights reserved.   6 Clicks Short Forms Basic Mobility Inpatient Short Form   [facility] AM-PAC  \"6 Clicks\" V.2 Basic Mobility Inpatient Short Form   1. Turning from your back to your side while in a flat bed without using bedrails? 1 - Total "   2. Moving from lying on your back to sitting on the side of a flat bed without using bedrails? 1 - Total   3. Moving to and from a bed to a chair (including a wheelchair)? 1 - Total   4. Standing up from a chair using your arms (e.g., wheelchair, or bedside chair)? 1 - Total   5. To walk in hospital room? 1 - Total   6. Climbing 3-5 steps with a railing? 1 - Total   Basic Mobility Raw Score (Score out of 24.Lower scores equate to lower levels of function) 6   Total Evaluation Time   Total Evaluation Time (Minutes) 15

## 2019-02-08 NOTE — PROGRESS NOTES
"Intensivist note  We saw her on vent this morning. She was on 40% FIO2 and +5 PEEP; she did well on a breathing trial and was successfully extubated. We will start diet and plant PT and OT, and pulmonary hygiene.     Assessment and Plan  1. Acute respiratory failure/ALI, and now extubated; due to septic shock, volume overload, and ALI. She has diuresed well over days with slow improvement in oxygenation; Lasix resumed and will continue to monitor. She is doing well of vent and will progress care as able  2. Recent episode of Vtach- currently on amiodarone, due to acute physiologic stress, and will complete a 2 week course of therapy.   3. Septic shock, resolving; due to E. Coli from urine/obstructed ureter  4. Obstructed left ureter,  with stent in place.  5. DM- glucoses ok  6. MS- barely ambulatory at her best  7. Right femoral neck fracture- discovered this admission incidentally  8. Acute Kidney injury- currently remains at .57 likely baseline.. She appears euvolemic at present.   9. HTN  10. Dyslipidemia   11. Morbid obesity  12. History of T cell lymphoma treated in 2011  Overall, acute and critical. I spent 35 minutes of critical care time with her.     Physical exam  Well developed female NAD  BP (!) 144/127   Pulse 89   Temp 97.8  F (36.6  C) (Oral)   Resp 23   Ht 1.626 m (5' 4\")   Wt 131.9 kg (290 lb 12.6 oz)   LMP 12/11/2011   SpO2 95%   BMI 49.91 kg/m    Lungs are mostly clear bilaterally  Heart is RRR  Abdomen is soft, obese, and non-tender  Extremities are warm with mild edema  Skin shows no cyanosis or mottling; chronic stasis changes unchanged.     Labs reviewed    rufino strauss  February 8, 2019            "

## 2019-02-09 ENCOUNTER — APPOINTMENT (OUTPATIENT)
Dept: SPEECH THERAPY | Facility: CLINIC | Age: 54
DRG: 853 | End: 2019-02-09
Payer: COMMERCIAL

## 2019-02-09 ENCOUNTER — APPOINTMENT (OUTPATIENT)
Dept: PHYSICAL THERAPY | Facility: CLINIC | Age: 54
DRG: 853 | End: 2019-02-09
Payer: COMMERCIAL

## 2019-02-09 LAB
ANION GAP SERPL CALCULATED.3IONS-SCNC: 5 MMOL/L (ref 3–14)
BUN SERPL-MCNC: 20 MG/DL (ref 7–30)
CALCIUM SERPL-MCNC: 7.9 MG/DL (ref 8.5–10.1)
CHLORIDE SERPL-SCNC: 107 MMOL/L (ref 94–109)
CO2 SERPL-SCNC: 31 MMOL/L (ref 20–32)
CREAT SERPL-MCNC: 0.6 MG/DL (ref 0.52–1.04)
ERYTHROCYTE [DISTWIDTH] IN BLOOD BY AUTOMATED COUNT: 17.9 % (ref 10–15)
GFR SERPL CREATININE-BSD FRML MDRD: >90 ML/MIN/{1.73_M2}
GLUCOSE BLDC GLUCOMTR-MCNC: 103 MG/DL (ref 70–99)
GLUCOSE BLDC GLUCOMTR-MCNC: 113 MG/DL (ref 70–99)
GLUCOSE BLDC GLUCOMTR-MCNC: 120 MG/DL (ref 70–99)
GLUCOSE BLDC GLUCOMTR-MCNC: 123 MG/DL (ref 70–99)
GLUCOSE BLDC GLUCOMTR-MCNC: 128 MG/DL (ref 70–99)
GLUCOSE BLDC GLUCOMTR-MCNC: 88 MG/DL (ref 70–99)
GLUCOSE SERPL-MCNC: 123 MG/DL (ref 70–99)
HBA1C MFR BLD: 6.6 % (ref 0–5.6)
HCT VFR BLD AUTO: 32 % (ref 35–47)
HGB BLD-MCNC: 9.5 G/DL (ref 11.7–15.7)
MAGNESIUM SERPL-MCNC: 1.8 MG/DL (ref 1.6–2.3)
MCH RBC QN AUTO: 26 PG (ref 26.5–33)
MCHC RBC AUTO-ENTMCNC: 29.7 G/DL (ref 31.5–36.5)
MCV RBC AUTO: 87 FL (ref 78–100)
PHOSPHATE SERPL-MCNC: 3.2 MG/DL (ref 2.5–4.5)
PLATELET # BLD AUTO: 473 10E9/L (ref 150–450)
POTASSIUM SERPL-SCNC: 3.8 MMOL/L (ref 3.4–5.3)
RBC # BLD AUTO: 3.66 10E12/L (ref 3.8–5.2)
SODIUM SERPL-SCNC: 143 MMOL/L (ref 133–144)
WBC # BLD AUTO: 24.5 10E9/L (ref 4–11)

## 2019-02-09 PROCEDURE — 92610 EVALUATE SWALLOWING FUNCTION: CPT | Mod: GN | Performed by: SPEECH-LANGUAGE PATHOLOGIST

## 2019-02-09 PROCEDURE — 99233 SBSQ HOSP IP/OBS HIGH 50: CPT | Performed by: INTERNAL MEDICINE

## 2019-02-09 PROCEDURE — 25000128 H RX IP 250 OP 636: Performed by: SURGERY

## 2019-02-09 PROCEDURE — 25000132 ZZH RX MED GY IP 250 OP 250 PS 637: Performed by: INTERNAL MEDICINE

## 2019-02-09 PROCEDURE — 97110 THERAPEUTIC EXERCISES: CPT | Mod: GP | Performed by: PHYSICAL THERAPIST

## 2019-02-09 PROCEDURE — 80048 BASIC METABOLIC PNL TOTAL CA: CPT | Performed by: INTERNAL MEDICINE

## 2019-02-09 PROCEDURE — 25000128 H RX IP 250 OP 636: Performed by: INTERNAL MEDICINE

## 2019-02-09 PROCEDURE — 00000146 ZZHCL STATISTIC GLUCOSE BY METER IP

## 2019-02-09 PROCEDURE — 83036 HEMOGLOBIN GLYCOSYLATED A1C: CPT | Performed by: INTERNAL MEDICINE

## 2019-02-09 PROCEDURE — 83735 ASSAY OF MAGNESIUM: CPT | Performed by: INTERNAL MEDICINE

## 2019-02-09 PROCEDURE — 25000132 ZZH RX MED GY IP 250 OP 250 PS 637: Performed by: SURGERY

## 2019-02-09 PROCEDURE — 20000003 ZZH R&B ICU

## 2019-02-09 PROCEDURE — 97530 THERAPEUTIC ACTIVITIES: CPT | Mod: GP | Performed by: PHYSICAL THERAPIST

## 2019-02-09 PROCEDURE — 40000193 ZZH STATISTIC PT WARD VISIT: Performed by: PHYSICAL THERAPIST

## 2019-02-09 PROCEDURE — 85027 COMPLETE CBC AUTOMATED: CPT | Performed by: INTERNAL MEDICINE

## 2019-02-09 PROCEDURE — 25000125 ZZHC RX 250: Performed by: SURGERY

## 2019-02-09 PROCEDURE — 40000225 ZZH STATISTIC SLP WARD VISIT: Performed by: SPEECH-LANGUAGE PATHOLOGIST

## 2019-02-09 PROCEDURE — 84100 ASSAY OF PHOSPHORUS: CPT | Performed by: INTERNAL MEDICINE

## 2019-02-09 PROCEDURE — 27210437 ZZH NUTRITION PRODUCT SEMIELEM INTERMED LITER

## 2019-02-09 RX ORDER — LOSARTAN POTASSIUM 50 MG/1
50 TABLET ORAL DAILY
Status: DISCONTINUED | OUTPATIENT
Start: 2019-02-10 | End: 2019-02-09

## 2019-02-09 RX ORDER — LOSARTAN POTASSIUM 50 MG/1
50 TABLET ORAL DAILY
Status: DISCONTINUED | OUTPATIENT
Start: 2019-02-09 | End: 2019-02-27 | Stop reason: HOSPADM

## 2019-02-09 RX ORDER — DEXTROSE MONOHYDRATE 25 G/50ML
25-50 INJECTION, SOLUTION INTRAVENOUS
Status: DISCONTINUED | OUTPATIENT
Start: 2019-02-09 | End: 2019-02-09

## 2019-02-09 RX ORDER — OXYCODONE HYDROCHLORIDE 5 MG/1
15 TABLET ORAL EVERY 6 HOURS
Status: DISCONTINUED | OUTPATIENT
Start: 2019-02-09 | End: 2019-02-27 | Stop reason: HOSPADM

## 2019-02-09 RX ORDER — OXYCODONE HCL 5 MG/5 ML
15 SOLUTION, ORAL ORAL EVERY 6 HOURS
Status: DISCONTINUED | OUTPATIENT
Start: 2019-02-09 | End: 2019-02-09

## 2019-02-09 RX ORDER — DOCUSATE SODIUM 100 MG/1
100 CAPSULE, LIQUID FILLED ORAL 2 TIMES DAILY PRN
Status: DISCONTINUED | OUTPATIENT
Start: 2019-02-09 | End: 2019-02-27 | Stop reason: HOSPADM

## 2019-02-09 RX ORDER — DEXTROSE MONOHYDRATE 25 G/50ML
25-50 INJECTION, SOLUTION INTRAVENOUS
Status: DISCONTINUED | OUTPATIENT
Start: 2019-02-09 | End: 2019-02-27 | Stop reason: HOSPADM

## 2019-02-09 RX ORDER — NICOTINE POLACRILEX 4 MG
15-30 LOZENGE BUCCAL
Status: DISCONTINUED | OUTPATIENT
Start: 2019-02-09 | End: 2019-02-09

## 2019-02-09 RX ORDER — AMIODARONE HYDROCHLORIDE 200 MG/1
200 TABLET ORAL DAILY
Status: COMPLETED | OUTPATIENT
Start: 2019-02-10 | End: 2019-02-16

## 2019-02-09 RX ORDER — GABAPENTIN 600 MG/1
600 TABLET ORAL 3 TIMES DAILY
Status: DISCONTINUED | OUTPATIENT
Start: 2019-02-09 | End: 2019-02-27 | Stop reason: HOSPADM

## 2019-02-09 RX ORDER — DULOXETIN HYDROCHLORIDE 30 MG/1
60 CAPSULE, DELAYED RELEASE ORAL 2 TIMES DAILY
Status: DISCONTINUED | OUTPATIENT
Start: 2019-02-09 | End: 2019-02-27 | Stop reason: HOSPADM

## 2019-02-09 RX ORDER — NICOTINE POLACRILEX 4 MG
15-30 LOZENGE BUCCAL
Status: DISCONTINUED | OUTPATIENT
Start: 2019-02-09 | End: 2019-02-27 | Stop reason: HOSPADM

## 2019-02-09 RX ORDER — METOPROLOL SUCCINATE 50 MG/1
50 TABLET, EXTENDED RELEASE ORAL DAILY
Status: DISCONTINUED | OUTPATIENT
Start: 2019-02-09 | End: 2019-02-27 | Stop reason: HOSPADM

## 2019-02-09 RX ORDER — HYDROCHLOROTHIAZIDE 12.5 MG/1
12.5 CAPSULE ORAL DAILY
Status: DISCONTINUED | OUTPATIENT
Start: 2019-02-10 | End: 2019-02-09

## 2019-02-09 RX ORDER — HYDROCHLOROTHIAZIDE 12.5 MG/1
12.5 CAPSULE ORAL DAILY
Status: DISCONTINUED | OUTPATIENT
Start: 2019-02-09 | End: 2019-02-18

## 2019-02-09 RX ADMIN — HEPARIN SODIUM 5000 UNITS: 5000 INJECTION, SOLUTION INTRAVENOUS; SUBCUTANEOUS at 16:11

## 2019-02-09 RX ADMIN — HYDROCHLOROTHIAZIDE 12.5 MG: 12.5 CAPSULE ORAL at 08:29

## 2019-02-09 RX ADMIN — HEPARIN SODIUM 5000 UNITS: 5000 INJECTION, SOLUTION INTRAVENOUS; SUBCUTANEOUS at 08:32

## 2019-02-09 RX ADMIN — OXYCODONE HYDROCHLORIDE 15 MG: 5 TABLET ORAL at 17:35

## 2019-02-09 RX ADMIN — OXYCODONE HYDROCHLORIDE 7.5 MG: 5 SOLUTION ORAL at 00:23

## 2019-02-09 RX ADMIN — RANITIDINE HYDROCHLORIDE 150 MG: 15 SOLUTION ORAL at 21:56

## 2019-02-09 RX ADMIN — AMITRIPTYLINE HYDROCHLORIDE 100 MG: 50 TABLET, FILM COATED ORAL at 21:55

## 2019-02-09 RX ADMIN — BACLOFEN 10 MG: 10 TABLET ORAL at 08:29

## 2019-02-09 RX ADMIN — RANITIDINE HYDROCHLORIDE 150 MG: 15 SOLUTION ORAL at 08:28

## 2019-02-09 RX ADMIN — TAZOBACTAM SODIUM AND PIPERACILLIN SODIUM 3.38 G: 375; 3 INJECTION, SOLUTION INTRAVENOUS at 11:47

## 2019-02-09 RX ADMIN — AMIODARONE HYDROCHLORIDE 400 MG: 200 TABLET ORAL at 08:29

## 2019-02-09 RX ADMIN — HEPARIN SODIUM 5000 UNITS: 5000 INJECTION, SOLUTION INTRAVENOUS; SUBCUTANEOUS at 23:34

## 2019-02-09 RX ADMIN — Medication 25 MG: at 08:28

## 2019-02-09 RX ADMIN — ASPIRIN 81 MG 162 MG: 81 TABLET ORAL at 08:29

## 2019-02-09 RX ADMIN — OXYCODONE HYDROCHLORIDE 7.5 MG: 5 SOLUTION ORAL at 04:53

## 2019-02-09 RX ADMIN — TAZOBACTAM SODIUM AND PIPERACILLIN SODIUM 3.38 G: 375; 3 INJECTION, SOLUTION INTRAVENOUS at 05:05

## 2019-02-09 RX ADMIN — BACLOFEN 10 MG: 10 TABLET ORAL at 21:55

## 2019-02-09 RX ADMIN — TAZOBACTAM SODIUM AND PIPERACILLIN SODIUM 3.38 G: 375; 3 INJECTION, SOLUTION INTRAVENOUS at 23:34

## 2019-02-09 RX ADMIN — ATORVASTATIN CALCIUM 10 MG: 10 TABLET, FILM COATED ORAL at 08:31

## 2019-02-09 RX ADMIN — POTASSIUM CHLORIDE 20 MEQ: 1.5 POWDER, FOR SOLUTION ORAL at 06:49

## 2019-02-09 RX ADMIN — GABAPENTIN 600 MG: 600 TABLET, FILM COATED ORAL at 21:55

## 2019-02-09 RX ADMIN — LOSARTAN POTASSIUM 50 MG: 50 TABLET, FILM COATED ORAL at 16:56

## 2019-02-09 RX ADMIN — METOPROLOL SUCCINATE 50 MG: 50 TABLET, EXTENDED RELEASE ORAL at 16:52

## 2019-02-09 RX ADMIN — MULTIVITAMIN 15 ML: LIQUID ORAL at 08:28

## 2019-02-09 RX ADMIN — DULOXETINE HYDROCHLORIDE 60 MG: 30 CAPSULE, DELAYED RELEASE ORAL at 21:55

## 2019-02-09 RX ADMIN — OXYCODONE HYDROCHLORIDE 7.5 MG: 5 SOLUTION ORAL at 08:28

## 2019-02-09 RX ADMIN — BACLOFEN 10 MG: 10 TABLET ORAL at 16:11

## 2019-02-09 RX ADMIN — GABAPENTIN 600 MG: 250 SUSPENSION ORAL at 08:27

## 2019-02-09 RX ADMIN — HEPARIN SODIUM 5000 UNITS: 5000 INJECTION, SOLUTION INTRAVENOUS; SUBCUTANEOUS at 00:23

## 2019-02-09 RX ADMIN — TAZOBACTAM SODIUM AND PIPERACILLIN SODIUM 3.38 G: 375; 3 INJECTION, SOLUTION INTRAVENOUS at 16:57

## 2019-02-09 RX ADMIN — Medication 2 G: at 18:46

## 2019-02-09 RX ADMIN — OXYCODONE HYDROCHLORIDE 15 MG: 5 TABLET ORAL at 23:33

## 2019-02-09 RX ADMIN — OXYCODONE HYDROCHLORIDE 15 MG: 5 SOLUTION ORAL at 11:50

## 2019-02-09 RX ADMIN — LOSARTAN POTASSIUM 50 MG: 50 TABLET ORAL at 08:28

## 2019-02-09 RX ADMIN — HYDROCHLOROTHIAZIDE 12.5 MG: 12.5 CAPSULE ORAL at 16:56

## 2019-02-09 RX ADMIN — GABAPENTIN 600 MG: 250 SUSPENSION ORAL at 16:10

## 2019-02-09 ASSESSMENT — ACTIVITIES OF DAILY LIVING (ADL)
ADLS_ACUITY_SCORE: 29

## 2019-02-09 ASSESSMENT — MIFFLIN-ST. JEOR: SCORE: 1865

## 2019-02-09 NOTE — PLAN OF CARE
ICU End of Shift Summary.  For vital signs and complete assessments, please see documentation flowsheets.     Pertinent assessments: pt adán now htn and required prn order for labetalol and for metoprolol to be started to manage. Bp now stable. Pt had good uop over night. Pt complaint of pain throughout the night scheduled medication and gtt did help though pt did have break through pain with left leg muscle spasms in her thigh. Pt turned with assistance throughout the night and skin in looking much better. Pt blood sugars required no insulin over night. Bedside nurse swallow study performed and pt showed no signs of choking or gagging, handling ice chips well.   Major Shift Events: HTN  Plan (Upcoming Events): consider switching to a oral diet and discontinue tube feeding. Pt will charan PT OT to strengthen already weak gate.  Discharge/Transfer Needs: tbd    Bedside Shift Report Completed :   Bedside Safety Check Completed:

## 2019-02-09 NOTE — PROGRESS NOTES
02/09/19 1200   General Information   Onset Date 01/30/19   Start of Care Date 02/09/19   Referring Physician Dr. Yarbrough   Patient Profile Review/OT: Additional Occupational Profile Info See Profile for full history and prior level of function   Patient/Family Goals Statement to eat    Swallowing Evaluation Bedside swallow evaluation   Behaviorial Observations WNL (within normal limits)   Mode of current nutrition NG   Respiratory Status Extubated on (date);O2 Supply;Other (see comments)  (Extubated 2/8/2019)   Type of O2 supply Nasal cannula   Comments 53 year old female with past medical history significant for advanced MS, diabetes mellitus, hypertension, history of T-cell lymphoma status post treatment, chronically elevated WBC count, history of recurrent UTIs who presented with altered mental status increased weakness and somnolence.  Evaluation in the emergency room showed significant leukocytosis, hypotension, acute renal failure, obstructing left kidney stone with moderate hydronephrosis and respiratory failure with acidosis with hypercapnia.  She was admitted was admitted on 1/30/2019 with septic shock.  Patient received IV antibiotics and fluid resuscitation in the emergency room and underwent emergent cystoscopy and left retrograde pyelogram with stent placement on 1/30/2019.  CT also showed closed fracture of right femur.  She was initially placed on BiPAP for respiratory failure but perioperatively was intubated. she was admitted to ICU for further evaluation. NG tube for feeding.  Extubated 2-8-2019.   Clinical Swallow Evaluation   Oral Musculature generally intact   Dentition present and adequate   Mucosal Quality good   Mandibular Strength and Mobility intact   Oral Labial Strength and Mobility WFL   Lingual Strength and Mobility WFL   Velar Elevation intact   Buccal Strength and Mobility intact   Laryngeal Function Swallow;Voicing initiated;Dry swallow palpated   Clinical Swallow Eval: Thin  Liquid Texture Trial   Mode of Presentation, Thin Liquids cup;self-fed   Volume of Liquid or Food Presented single sips and multiple sips   Oral Phase of Swallow WFL   Pharyngeal Phase of Swallow intact   Diagnostic Statement ADequate oral management of thin liquids and no overt s/s aspiration.   Clinical Swallow Eval: Puree Solid Texture Trial   Mode of Presentation, Puree spoon;fed by clinician   Volume of Puree Presented 1/2 t boluses   Oral Phase, Puree WFL   Pharyngeal Phase, Puree intact   Diagnostic Statement Adequate oral management of pureed textures; no overt s/s aspiration.   Clinical Swallow Eval: Solid Food Texture Trial   Mode of Presentation, Solid self-fed   Volume of Solid Food Presented katelyn cracker   Oral Phase, Solid WFL   Pharyngeal Phase, Solid intact   Diagnostic Statement Adequate mastication of boluses, no oral residue, timely swallows.  No overt s/s aspiration.   Esophageal Phase of Swallow   Patient reports or presents with symptoms of esophageal dysphagia No   Swallow Eval: Clinical Impressions   Skilled Criteria for Therapy Intervention No problems identified which require skilled intervention   Functional Assessment Scale (FAS) 7   Treatment Diagnosis dysphagia   Diet texture recommendations Regular diet;Thin liquids   Recommended Feeding/Eating Techniques alternate between small bites and sips of food/liquid;maintain upright posture during/after eating for 30 mins;small sips/bites   Risks and Benefits of Treatment have been explained. Yes   Patient, family and/or staff in agreement with Plan of Care Yes   Clinical Impression Comments Pt seen for bedside swallow evaluation following extubation yesterday.  She has advanced MS but has not had any dysphagia.  Oral motor evaluation wnl.  Pt presented thin liquids in cup, pureed, and katelyn cracker solids.  Oral bolus management of all textures was wnl, oral prep and transit times were wnl.  No oral residue on any texture.  Laryngeal  elevation all textures.  No overt s/s aspiration on any textures.  Pt should tolerate a regular diet with thin liquids.  She should be upright in chair all meals, small bites and sips, slow rate of intake, alternate solids and liquids and upright 1/2 hour after intake.  She was educated on these safe swallow strategies.  Swallow is wnl so no further SLP services are required.   Total Evaluation Time   Total Evaluation Time (Minutes) 18

## 2019-02-09 NOTE — PLAN OF CARE
Discharge Planner PT     Patient plan for discharge: TCU  Current status: Patient performed rolling to L with Mod A and use of bedrail.  L sidely > sit with Max A at trunk and Mod/Max A at B LEs to bring over EOB.  Pt sat on EOB for ~ 7-8 min with initial L lean and Min A progressing to SBA with pt maintaining midline.  Max A to scoot to EOB. Attempted stance at magan steady from elevated bed with folded towel under L foot to decrease WB on R LE (previous hip fx with MD orders to WB as pt was previously at home for transfers).  Pt able to maintain partial stance for ~ 20 sec but unable to lift buttocks high enough to place paddles.  Pt also leaning R UE on paddle preventing movement.  Pt then transferred bed > chair <> commode with ceiling lift and A x 2-3.  Pt had BM. Pt dependent with pericares. Pt tranferred to recliner with feet on floor and call light within reach.  Barriers to return to prior living situation: Pt alone during day, current level of assist  Recommendations for discharge: TCU  Rationale for recommendations: Pt significantly below baseline with all mobility.  Continued skilled PT (and OT) to increase overall strength/ROM, increase independence with bed mobility and transfers and increase activity tolerance to enable pt to return to her home at prior level of function or with least level of assist.       Entered by: Debra Crandall 02/09/2019 3:56 PM

## 2019-02-09 NOTE — PROGRESS NOTES
1953  Called regarding BP  Pt on oral metoprolol at home  Extubated today   systolic  Restart BID metoprolol via FT or oral  PRN labetolol  GB

## 2019-02-09 NOTE — PROGRESS NOTES
[facility]    Hospitalist Progress Note  Name: Amanda Richardson    MRN: [id number]  Provider: Hermilo Yarbrough MD  Date of Service: 02/09/2019    Assessment & Plan   Summary of Stay: Amanda Richardson is a 53 year old female with past medical history significant for advanced MS, diabetes mellitus, hypertension, history of T-cell lymphoma status post treatment, chronically elevated WBC count, history of recurrent UTIs who presented with altered mental status increased weakness and somnolence.  Evaluation in the emergency room showed significant leukocytosis, hypotension, acute renal failure, obstructing left kidney stone with moderate hydronephrosis and respiratory failure with acidosis with hypercapnia.  She was admitted was admitted on 1/30/2019 with septic shock.  Patient received IV antibiotics and fluid resuscitation in the emergency room and underwent emergent cystoscopy and left retrograde pyelogram with stent placement on 1/30/2019.  CT also showed closed fracture of right femur.  She was initially placed on BiPAP for respiratory failure but perioperatively was intubated. she was admitted to ICU for further evaluation.    1/31/2019 patient developed wide-complex tachycardia appeared to be SVT failed to respond to vasovagal maneuvers.  Adenosine was given which showed rhythm to be atrial flutter she received cardioversion and was started on amiodarone.  Angiotensin II drip was initiated briefly for hypotension.  She is now off pressors as well as sedative meds.     Continues improved. Pt extubated successfully on 2/8. No able to eat per SLP. Pt interested in PT and OT. Complaining of some back pain this am, noting that her usual dose of Oxycodone is 15 mg po Q 6 hours.       1.  Septic shock likely due to urinary tract source (altered mental status, acute renal failure, hypotension, leukocytosis, acidosis, respiratory failure).  She received aggressive fluid resuscitation along with initiation of  "pressors prior to being taken to the operating room.  She now is stable and shock is fully resolved.  --CT scan on admission showed obstructing stone with hydronephrosis and acute renal failure  --Status post cystoscopy and stent placement 1/30/2019.  Per report patient did have purulent drainage noted during cystoscopy.  --IV antibiotic started zosyn to cover for E. Coli isolate  --Blood and urine cultures positive for E coli  --extubated as of 2/8/19    2.  Hypercapnic respiratory failure present on admission. Resolved.   --Likely multifactorial from sepsis and chronic narcotic use  --Was intubated perioperatively and remained intubated following the procedure until 2/8.    3.  Acute renal failure with left hydronephrosis: Resolved and now at baseline  --Secondary to obstructive uropathy.  Creatinine on admission 2.25, now with creatinine 0.6.  --Status post a stent placement; anticipate outpatient follow-up for definitive treatment of the stone and removal of the stent.    4.  New Atrial flutter provoked by urosepsis.  Stable without recurrence.  --Status post DC cardioversion 1/31/2019  --Appreciate cardiology consult  --Cardiac echo when compared to prior study showed minimal deterioration of left ventricular systolic function.  EF 50-55%  --No anticoagulation for now per cardiology patient was in atrial flutter for less than 8 hours  --Switched to oral amiodarone for a total of 2 weeks (now on 200 mg daily).  Probably can stop at the time of discharge.    Right femoral neck fracture  --Incidental finding on CT  --Patient has MS and at baseline is able to pivot herself  --at this time, pt notes that the right hip is painful with vigorous movement.  --Orthopedic surgery was consulted.  No intervention recommended at this time with plan for non-operative management going forward.    Type 2 diabetes mellitus, \"borderline\"  --At baseline on metformin will hold for now  --Sliding scale insulin    MS with associated " chronic pain  --Prior to admission on amitriptyline, baclofen, duloxetine, gabapentin and oxycodone  --PT evaluation    Significant leukocytosis: Improving  --Patient has history of T-cell non-Hodgkin's lymphoma status post treatment with baseline elevated white cell count (mid-teens)    H/o Hypertension  --resumed losartan and hydrochlorothiazide (not quite at pre-hospitalization doses)    Bilateral inguinal wounds  --managed with topical wound cares.  --should likely have home health help with management of these upon discharge    DVT Prophylaxis: Pneumatic Compression Devices  Code Status: Full Code    Disposition: likely TCU with plan to ultimately return home.    OK for transfer to floor today. Will ask for tele, in view of the episode of A fib.       Interval History     Doing well. SLP has approved her to eat regular diet. Some back pain this am.     -Data reviewed today: I reviewed all new labs and imaging reports over the last 24 hours. I personally reviewed no images or EKG's today.    Physical Exam   Temp: 98.5  F (36.9  C) Temp src: Oral BP: 144/66 Pulse: 94 Heart Rate: 95 Resp: 21 SpO2: 97 % O2 Device: Oxymask Oxygen Delivery: (P) 7 LPM  Vitals:    02/06/19 0600 02/07/19 0635 02/09/19 0437   Weight: 126.8 kg (279 lb 8.7 oz) 131.9 kg (290 lb 12.6 oz) 128.5 kg (283 lb 4.7 oz)     Vital Signs with Ranges  Temp:  [97.8  F (36.6  C)-99.1  F (37.3  C)] 98.5  F (36.9  C)  Pulse:  [] 94  Heart Rate:  [] 95  Resp:  [14-27] 21  BP: (120-174)/() 144/66  FiO2 (%):  [50 %] 50 %  SpO2:  [79 %-99 %] 97 %  I/O last 3 completed shifts:  In: 2622.79 [I.V.:517.79; NG/GT:435]  Out: 4150 [Urine:4150]      GEN: alert, interactive.   HEENT:  Normocephalic/atraumatic, no scleral icterus, no nasal discharge, mouth dry  CV: Tachycardic no murmur.    LUNGSleft basilar crackles.  Symmetric chest rise on inhalation noted.  ABD:  Active bowel sounds, soft, non-tender/non-distended.  No rebound/guarding/rigidity.    EXT: + edema.  No cyanosis.    SKIN:  Dry to touch, chronic stasis dermatitis and bilateral inguinal hidradenitis    Medications     IV fluid REPLACEMENT ONLY       fentanyl 25 mcg/hr (02/08/19 1400)     Patient RECEIVING antibiotic to treat a different condition and it provides ADEQUATE COVERAGE for this surgical procedure.       sodium chloride 10 mL/hr at 02/08/19 0500     sodium chloride 10 mL/hr at 02/08/19 0500       [START ON 2/10/2019] amiodarone  200 mg Oral Daily     amiodarone  400 mg Oral Daily     amitriptyline  100 mg Oral or Feeding Tube At Bedtime     aspirin  162 mg Oral or Feeding Tube Daily     atorvastatin  10 mg Oral or Feeding Tube Daily     baclofen  10 mg Oral or Feeding Tube TID     gabapentin  600 mg Oral or Feeding Tube TID     heparin lock flush  5-10 mL Intracatheter Q24H     heparin  5,000 Units Subcutaneous Q8H     hydrochlorothiazide  12.5 mg Oral Daily     insulin aspart  1-6 Units Subcutaneous Q4H     losartan  50 mg Oral Daily     metoprolol  25 mg Oral or Feeding Tube BID     multivitamins w/minerals  15 mL Per Feeding Tube Daily     oxyCODONE  7.5 mg Oral or Feeding Tube Q4H     piperacillin-tazobactam  3.375 g Intravenous Q6H     rantidine  150 mg Oral or Feeding Tube BID     sodium chloride (PF)  10 mL Intracatheter Q7 Days     Data     No results for input(s): PH, PHV, PO2, PO2V, SAT, PCO2, PCO2V, HCO3, HCO3V in the last 168 hours.  Recent Labs   Lab 02/09/19  0540 02/08/19  0605 02/07/19  0550   WBC 24.5* 21.2* 19.1*   HGB 9.5* 9.5* 9.2*   HCT 32.0* 32.2* 31.7*   MCV 87 87 89   * 451* 381     Recent Labs   Lab 02/09/19  0540 02/08/19  0605 02/08/19  0234 02/07/19  0550    142  --  142   POTASSIUM 3.8 3.7 3.5 3.8   CHLORIDE 107 106  --  107   CO2 31 31  --  32   ANIONGAP 5 5  --  3   * 132*  --  135*   BUN 20 23  --  26   CR 0.60 0.57  --  0.57   GFRESTIMATED >90 >90  --  >90   GFRESTBLACK >90 >90  --  >90   MARY 7.9* 7.7*  --  7.5*     Recent Labs   Lab  02/04/19  2130   CULT Cultured on the 2nd day of incubation:  Staphylococcus epidermidis  *  Critical Value/Significant Value, preliminary result only, called to and read back by  Claudia Marsh RN [id number] 2/6/19. MS    (Note)  POSITIVE for STAPHYLOCOCCUS EPIDERMIDIS and POSITIVE for the mecA  gene (resistant to methicillin) by [employer] multiplex nucleic acid  test. Final identification and antimicrobial susceptibility testing  will be verified by standard methods.      Specimen tested with [employer] multiplex, gram-positive blood culture  nucleic acid test for the following targets: Staph aureus, Staph  epidermidis, Staph lugdunensis, other Staph species, Enterococcus  faecalis, Enterococcus faecium, Streptococcus species, S. agalactiae,  S. anginosus grp., S. pneumoniae, S. pyogenes, Listeria sp., mecA  (methicillin resistance) and Randolph/B (vancomycin resistance).      Critical Value/Significant Value called to and read back by FREDO ESPINO 02.06.2019 AT 7.43AM,ZG    No growth     Recent Labs   Lab 02/09/19  0540 02/08/19  0605 02/07/19  0550   HGB 9.5* 9.5* 9.2*

## 2019-02-09 NOTE — PROGRESS NOTES
[facility]    Hospitalist Progress Note  Name: Amanda Richardson    MRN: [id number]  Provider: Hermilo Yarbrough MD  Date of Service: 02/08/2019    Assessment & Plan   Summary of Stay: Amanda Richardson is a 53 year old female with past medical history significant for advanced MS, diabetes mellitus, hypertension, history of T-cell lymphoma status post treatment, chronically elevated WBC count, history of recurrent UTIs who presented with altered mental status increased weakness and somnolence.  Evaluation in the emergency room showed significant leukocytosis, hypotension, acute renal failure, obstructing left kidney stone with moderate hydronephrosis and respiratory failure with acidosis with hypercapnia.  She was admitted was admitted on 1/30/2019 with septic shock.  Patient received IV antibiotics and fluid resuscitation in the emergency room and underwent emergent cystoscopy and left retrograde pyelogram with stent placement on 1/30/2019.  CT also showed closed fracture of right femur.  She was initially placed on BiPAP for respiratory failure but perioperatively was intubated. she was admitted to ICU for further evaluation.    1/31/2019 patient developed wide-complex tachycardia appeared to be SVT failed to respond to vasovagal maneuvers.  Adenosine was given which showed rhythm to be atrial flutter she received cardioversion and was started on amiodarone.  Angiotensin II drip was initiated  briefly for hypotension.  She is now off pressors.  Continues to be on Precedex.    Continues improved. Pt extubated successfully today.       Septic shock likely due to urinary tract source (altered mental status, acute renal failure, hypotension, leukocytosis, acidosis, respiratory failure).  Now essentially resolved.  --CT scan on admission showed obstructing stone with hydronephrosis and acute renal failure  --Likely secondary to infected kidney stone  --Status post cystoscopy and stent placement 1/30/2019.   Per report patient did have purulent drainage noted during cystoscopy.  --Fluid resuscitation in the emergency room  --IV antibiotic started zosyn to cover for possible E. coli infection.  She also received a azithromycin for possible aspiration pneumonia as noted on CT  --Blood and urine cultures positive for E coli  --Off pressors  --Continues to be on Precedex.  --Remains intubated, expected extubation tomorrow.    Hypercapnic respiratory failure. Resolved.   --Likely multifactorial from sepsis and chronic narcotic use  --Was intubated perioperatively  --extubated 2/8.    Acute renal failure with left hydronephrosis: Resolved now at baseline  --Secondary to obstructive uropathy.  Creatinine on admission 2.25   --Status post a stent placement  --Urology consulted and following    New Atrial flutter provoked by urosepsis.  Stable without recurrence.  --Status post cardioversion 1/31/2019  --Appreciate cardiology consult  --Cardiac echo when compared to prior study showed minimal deterioration of left ventricular systolic function.  EF 50-55%  --No anticoagulation for now per cardiology patient was in atrial flutter for less than 8 hours  --Switched to oral amiodarone for a total of 2 weeks    Right femoral neck fracture  --Incidental finding on CT  --Patient has MS at baseline is able to pivot herself  --No pain noted on initial exam  --Orthopedic surgery was consulted.  No intervention recommended at this time with plan for non-operative management going forward.    Type 2 diabetes mellitus  --At baseline on metformin will hold for now  --Sliding scale insulin    MS with associated chronic pain  --Prior to admission on amitriptyline, baclofen, duloxetine, gabapentin and oxycodone  --PT evaluation    Significant leukocytosis: Improving  --Patient has history of T-cell non-Hodgkin's lymphoma status post treatment at baseline white cell count is in the mid teens  --Significant initial elevation likely due to  sepsis    H/o Hypertension  --resume losartan and hydrochlorothiazide     Bilateral inguinal wounds  --managed with topical wound cares.    DVT Prophylaxis: Pneumatic Compression Devices  Code Status: Full Code    Disposition: likely TCU with plan to ultimately return home.      Interval History   Extubated. Eager to eat. Alert and appropriate.   Overall dramatically improved. Very interactive and appropriate.     -Data reviewed today: I reviewed all new labs and imaging reports over the last 24 hours. I personally reviewed no images or EKG's today.    Physical Exam   Temp: 97.8  F (36.6  C) Temp src: Oral BP: 166/87 Pulse: 101 Heart Rate: 106 Resp: 26 SpO2: (!) 88 % O2 Device: Oxymask Oxygen Delivery: 10 LPM  Vitals:    02/05/19 0000 02/06/19 0600 02/07/19 0635   Weight: 125.9 kg (277 lb 9 oz) 126.8 kg (279 lb 8.7 oz) 131.9 kg (290 lb 12.6 oz)     Vital Signs with Ranges  Temp:  [97.8  F (36.6  C)-99  F (37.2  C)] 97.8  F (36.6  C)  Pulse:  [] 101  Heart Rate:  [] 106  Resp:  [12-27] 26  BP: (112-176)/() 166/87  FiO2 (%):  [50 %] 50 %  SpO2:  [88 %-96 %] 88 %  I/O last 3 completed shifts:  In: 3352.14 [I.V.:1042.14; NG/GT:720]  Out: 3250 [Urine:3250]      GEN: alert, interactive.   HEENT:  Normocephalic/atraumatic, no scleral icterus, no nasal discharge, mouth dry  CV: Tachycardic no murmur.    LUNGSleft basilar crackles.  Symmetric chest rise on inhalation noted.  ABD:  Active bowel sounds, soft, non-tender/non-distended.  No rebound/guarding/rigidity.   EXT: + edema.  No cyanosis.    SKIN:  Dry to touch, chronic stasis dermatitis and bilateral inguinal hidradenitis    Medications     dexmedetomidine Stopped (02/08/19 1005)     IV fluid REPLACEMENT ONLY       fentanyl 25 mcg/hr (02/08/19 1400)     Patient RECEIVING antibiotic to treat a different condition and it provides ADEQUATE COVERAGE for this surgical procedure.       propofol (DIPRIVAN) infusion       sodium chloride 10 mL/hr at 02/08/19  0500     sodium chloride 10 mL/hr at 02/08/19 0500       [START ON 2/10/2019] amiodarone  200 mg Oral Daily     amiodarone  400 mg Oral Daily     amitriptyline  100 mg Oral or Feeding Tube At Bedtime     aspirin  162 mg Oral or Feeding Tube Daily     atorvastatin  10 mg Oral or Feeding Tube Daily     baclofen  10 mg Oral or Feeding Tube TID     gabapentin  600 mg Oral or Feeding Tube TID     heparin lock flush  5-10 mL Intracatheter Q24H     heparin  5,000 Units Subcutaneous Q8H     insulin aspart  1-6 Units Subcutaneous Q4H     multivitamins w/minerals  15 mL Per Feeding Tube Daily     oxyCODONE  7.5 mg Oral or Feeding Tube Q4H     piperacillin-tazobactam  3.375 g Intravenous Q6H     rantidine  150 mg Oral or Feeding Tube BID     sodium chloride (PF)  10 mL Intracatheter Q7 Days     Data     No results for input(s): PH, PHV, PO2, PO2V, SAT, PCO2, PCO2V, HCO3, HCO3V in the last 168 hours.  Recent Labs   Lab 02/08/19  0605 02/07/19  0550 02/06/19  0515   WBC 21.2* 19.1* 19.2*   HGB 9.5* 9.2* 9.2*   HCT 32.2* 31.7* 31.1*   MCV 87 89 87   * 381 346     Recent Labs   Lab 02/08/19  0605 02/08/19  0234 02/07/19  0550 02/06/19  0515     --  142 145*   POTASSIUM 3.7 3.5 3.8 4.0   CHLORIDE 106  --  107 107   CO2 31  --  32 36*   ANIONGAP 5  --  3 2*   *  --  135* 136*   BUN 23  --  26 29   CR 0.57  --  0.57 0.73   GFRESTIMATED >90  --  >90 >90   GFRESTBLACK >90  --  >90 >90   MARY 7.7*  --  7.5* 7.6*     Recent Labs   Lab 02/04/19  2130   CULT Cultured on the 2nd day of incubation:  Staphylococcus epidermidis  *  Critical Value/Significant Value, preliminary result only, called to and read back by  Claudia Marsh, RN [id number] 2/6/19. MS    (Note)  POSITIVE for STAPHYLOCOCCUS EPIDERMIDIS and POSITIVE for the mecA  gene (resistant to methicillin) by [employer] multiplex nucleic acid  test. Final identification and antimicrobial susceptibility testing  will be verified by standard methods.      Specimen tested  with Apogenixigene multiplex, gram-positive blood culture  nucleic acid test for the following targets: Staph aureus, Staph  epidermidis, Staph lugdunensis, other Staph species, Enterococcus  faecalis, Enterococcus faecium, Streptococcus species, S. agalactiae,  S. anginosus grp., S. pneumoniae, S. pyogenes, Listeria sp., mecA  (methicillin resistance) and Randolph/B (vancomycin resistance).      Critical Value/Significant Value called to and read back by FREDO ESPINO 02.06.2019 AT 7.43AM,ZG    No growth     Recent Labs   Lab 02/08/19  0605 02/07/19  0550 02/06/19  0515   HGB 9.5* 9.2* 9.2*

## 2019-02-09 NOTE — PLAN OF CARE
SLP-  Order for swallow evaluation received.  Pt extubated this am.  Will see patient tomorrow morning for swallow evaluation.  Protocol wait 24 hours after extubation for swallow evaluation.

## 2019-02-09 NOTE — PROGRESS NOTES
NUTRITION BRIEF NOTE  See RD note 2/6 for full assessment details    New findings in last 24 hours:  SLP consult for today, NPO. Extubated 2/8. Remains on EN at goal rate with good tolerance:    Type of Feeding Tube: NG (2/1)  Enteral Frequency:  Continuous  Enteral Regimen: Peptamen Intense VHP at 75 mL/hr  Total Enteral Provisions: 1800 mL provides 1800 kcal (kcal per kg), 167 g protein (g per kg), 1512 ml free H2O, 140 g CHO and 7 g Fiber daily.  Meets >100% of DRI's.  Free Water Flush: 60 mL q4 hours    Diet history per patient: meals at least BID. No previous diet restrictions, but does follow no added salt. No oral nutrition supplements. Denied issues chewing and swallowing. No changes to intake PTA.    Interventions:  Continue EN as ordered. If diet advanced to >/= full liquids and intake able to meet >65% of estimated needs orally, remove FT 2/10? - will monitor adequacy of PO intake daily.      Nutrition education: reviewed POC with patient at bedside, in agreement    Collaboration and Referral of care: Discussed patient with RN and SLP    Please page/consult as needed.      Sia Langley RDN, LD, CNSC  Pager - 3rd floor/ICU: [phone]  Pager - All other floors: [phone]  Pager - Weekend/holiday: [phone]  Office: [phone]

## 2019-02-10 ENCOUNTER — APPOINTMENT (OUTPATIENT)
Dept: PHYSICAL THERAPY | Facility: CLINIC | Age: 54
DRG: 853 | End: 2019-02-10
Payer: COMMERCIAL

## 2019-02-10 LAB
GLUCOSE BLDC GLUCOMTR-MCNC: 102 MG/DL (ref 70–99)
GLUCOSE BLDC GLUCOMTR-MCNC: 108 MG/DL (ref 70–99)
GLUCOSE BLDC GLUCOMTR-MCNC: 119 MG/DL (ref 70–99)
GLUCOSE BLDC GLUCOMTR-MCNC: 123 MG/DL (ref 70–99)
MAGNESIUM SERPL-MCNC: 1.9 MG/DL (ref 1.6–2.3)
PHOSPHATE SERPL-MCNC: 4 MG/DL (ref 2.5–4.5)
POTASSIUM SERPL-SCNC: 3.7 MMOL/L (ref 3.4–5.3)

## 2019-02-10 PROCEDURE — 25000128 H RX IP 250 OP 636: Performed by: SURGERY

## 2019-02-10 PROCEDURE — 25000132 ZZH RX MED GY IP 250 OP 250 PS 637: Performed by: SURGERY

## 2019-02-10 PROCEDURE — 83735 ASSAY OF MAGNESIUM: CPT | Performed by: INTERNAL MEDICINE

## 2019-02-10 PROCEDURE — 12000000 ZZH R&B MED SURG/OB

## 2019-02-10 PROCEDURE — 84100 ASSAY OF PHOSPHORUS: CPT | Performed by: INTERNAL MEDICINE

## 2019-02-10 PROCEDURE — 00000146 ZZHCL STATISTIC GLUCOSE BY METER IP

## 2019-02-10 PROCEDURE — 25000132 ZZH RX MED GY IP 250 OP 250 PS 637: Performed by: INTERNAL MEDICINE

## 2019-02-10 PROCEDURE — 25000125 ZZHC RX 250: Performed by: SURGERY

## 2019-02-10 PROCEDURE — 97110 THERAPEUTIC EXERCISES: CPT | Mod: GP | Performed by: PHYSICAL THERAPY ASSISTANT

## 2019-02-10 PROCEDURE — 84132 ASSAY OF SERUM POTASSIUM: CPT | Performed by: INTERNAL MEDICINE

## 2019-02-10 PROCEDURE — 97116 GAIT TRAINING THERAPY: CPT | Mod: GP | Performed by: PHYSICAL THERAPY ASSISTANT

## 2019-02-10 PROCEDURE — 25000128 H RX IP 250 OP 636: Performed by: INTERNAL MEDICINE

## 2019-02-10 PROCEDURE — 40000193 ZZH STATISTIC PT WARD VISIT: Performed by: PHYSICAL THERAPY ASSISTANT

## 2019-02-10 PROCEDURE — 99233 SBSQ HOSP IP/OBS HIGH 50: CPT | Performed by: INTERNAL MEDICINE

## 2019-02-10 RX ORDER — FUROSEMIDE 10 MG/ML
40 INJECTION INTRAMUSCULAR; INTRAVENOUS DAILY
Status: COMPLETED | OUTPATIENT
Start: 2019-02-10 | End: 2019-02-12

## 2019-02-10 RX ORDER — ACETAMINOPHEN 325 MG/1
650 TABLET ORAL EVERY 6 HOURS PRN
Status: DISCONTINUED | OUTPATIENT
Start: 2019-02-10 | End: 2019-02-27 | Stop reason: HOSPADM

## 2019-02-10 RX ORDER — AMOXICILLIN 250 MG
2 CAPSULE ORAL AT BEDTIME
Status: DISCONTINUED | OUTPATIENT
Start: 2019-02-10 | End: 2019-02-14

## 2019-02-10 RX ORDER — CEFTRIAXONE 2 G/1
2 INJECTION, POWDER, FOR SOLUTION INTRAMUSCULAR; INTRAVENOUS EVERY 24 HOURS
Status: COMPLETED | OUTPATIENT
Start: 2019-02-10 | End: 2019-02-13

## 2019-02-10 RX ORDER — POLYETHYLENE GLYCOL 3350 17 G/17G
17 POWDER, FOR SOLUTION ORAL 2 TIMES DAILY PRN
Status: DISCONTINUED | OUTPATIENT
Start: 2019-02-10 | End: 2019-02-13

## 2019-02-10 RX ORDER — MULTIVITAMIN,THERAPEUTIC
1 TABLET ORAL DAILY
Status: DISCONTINUED | OUTPATIENT
Start: 2019-02-10 | End: 2019-02-10

## 2019-02-10 RX ADMIN — ATORVASTATIN CALCIUM 10 MG: 10 TABLET, FILM COATED ORAL at 08:38

## 2019-02-10 RX ADMIN — BACLOFEN 10 MG: 10 TABLET ORAL at 15:44

## 2019-02-10 RX ADMIN — FUROSEMIDE 40 MG: 10 INJECTION, SOLUTION INTRAMUSCULAR; INTRAVENOUS at 11:12

## 2019-02-10 RX ADMIN — METOPROLOL SUCCINATE 50 MG: 50 TABLET, EXTENDED RELEASE ORAL at 08:34

## 2019-02-10 RX ADMIN — OXYCODONE HYDROCHLORIDE 15 MG: 5 TABLET ORAL at 17:59

## 2019-02-10 RX ADMIN — RANITIDINE 150 MG: 150 TABLET ORAL at 09:23

## 2019-02-10 RX ADMIN — HEPARIN SODIUM 5000 UNITS: 5000 INJECTION, SOLUTION INTRAVENOUS; SUBCUTANEOUS at 23:31

## 2019-02-10 RX ADMIN — DULOXETINE HYDROCHLORIDE 60 MG: 30 CAPSULE, DELAYED RELEASE ORAL at 22:07

## 2019-02-10 RX ADMIN — OXYCODONE HYDROCHLORIDE 15 MG: 5 TABLET ORAL at 12:09

## 2019-02-10 RX ADMIN — GABAPENTIN 600 MG: 600 TABLET, FILM COATED ORAL at 22:08

## 2019-02-10 RX ADMIN — AMITRIPTYLINE HYDROCHLORIDE 100 MG: 50 TABLET, FILM COATED ORAL at 22:07

## 2019-02-10 RX ADMIN — DOCUSATE SODIUM 100 MG: 100 CAPSULE, LIQUID FILLED ORAL at 12:42

## 2019-02-10 RX ADMIN — Medication 2 G: at 08:29

## 2019-02-10 RX ADMIN — DULOXETINE HYDROCHLORIDE 60 MG: 30 CAPSULE, DELAYED RELEASE ORAL at 08:32

## 2019-02-10 RX ADMIN — HEPARIN SODIUM 5000 UNITS: 5000 INJECTION, SOLUTION INTRAVENOUS; SUBCUTANEOUS at 15:45

## 2019-02-10 RX ADMIN — BACLOFEN 10 MG: 10 TABLET ORAL at 22:08

## 2019-02-10 RX ADMIN — OXYCODONE HYDROCHLORIDE 15 MG: 5 TABLET ORAL at 23:30

## 2019-02-10 RX ADMIN — GABAPENTIN 600 MG: 600 TABLET, FILM COATED ORAL at 08:34

## 2019-02-10 RX ADMIN — SENNOSIDES AND DOCUSATE SODIUM 2 TABLET: 8.6; 5 TABLET ORAL at 22:08

## 2019-02-10 RX ADMIN — GABAPENTIN 600 MG: 600 TABLET, FILM COATED ORAL at 15:44

## 2019-02-10 RX ADMIN — HYDROCHLOROTHIAZIDE 12.5 MG: 12.5 CAPSULE ORAL at 08:34

## 2019-02-10 RX ADMIN — POTASSIUM CHLORIDE 20 MEQ: 1500 TABLET, EXTENDED RELEASE ORAL at 08:31

## 2019-02-10 RX ADMIN — ASPIRIN 81 MG 162 MG: 81 TABLET ORAL at 08:33

## 2019-02-10 RX ADMIN — TAZOBACTAM SODIUM AND PIPERACILLIN SODIUM 3.38 G: 375; 3 INJECTION, SOLUTION INTRAVENOUS at 05:04

## 2019-02-10 RX ADMIN — LOSARTAN POTASSIUM 50 MG: 50 TABLET, FILM COATED ORAL at 08:33

## 2019-02-10 RX ADMIN — THERA TABS 1 TABLET: TAB at 09:23

## 2019-02-10 RX ADMIN — POLYETHYLENE GLYCOL 3350 17 G: 17 POWDER, FOR SOLUTION ORAL at 12:41

## 2019-02-10 RX ADMIN — AMIODARONE HYDROCHLORIDE 200 MG: 200 TABLET ORAL at 08:32

## 2019-02-10 RX ADMIN — CEFTRIAXONE SODIUM 2 G: 2 INJECTION, POWDER, FOR SOLUTION INTRAMUSCULAR; INTRAVENOUS at 11:08

## 2019-02-10 RX ADMIN — BACLOFEN 10 MG: 10 TABLET ORAL at 08:38

## 2019-02-10 RX ADMIN — HEPARIN SODIUM 5000 UNITS: 5000 INJECTION, SOLUTION INTRAVENOUS; SUBCUTANEOUS at 08:39

## 2019-02-10 RX ADMIN — OXYCODONE HYDROCHLORIDE 15 MG: 5 TABLET ORAL at 05:05

## 2019-02-10 ASSESSMENT — ACTIVITIES OF DAILY LIVING (ADL)
ADLS_ACUITY_SCORE: 29
ADLS_ACUITY_SCORE: 29
ADLS_ACUITY_SCORE: 27
ADLS_ACUITY_SCORE: 29
ADLS_ACUITY_SCORE: 27
ADLS_ACUITY_SCORE: 29

## 2019-02-10 ASSESSMENT — MIFFLIN-ST. JEOR: SCORE: 1826

## 2019-02-10 NOTE — PLAN OF CARE
Received patient from ICU this late morning. A and O x4. Turned and repositioned q 2 hours. Up in chair via lift. Ate 80% of lunch. Good fluid intake. See I and O. IV lasix given. 2+ lower ext edema. PICC right arm WNL. Allergy,falls and limb alert bands placed. Lungs decreased bilat. Productive cough. Tele SR. Bobo with 2000 out.

## 2019-02-10 NOTE — PLAN OF CARE
ICU End of Shift Summary.  For vital signs and complete assessments, please see documentation flowsheets.     Pertinent assessments: pt up in chair x2.  Tried to have a bm on commode no success.  Stool softeners ordered.  Fentanyl gtt discontinued and home routine of oxycodone started with success.  Pain comes down to a 4 after her meds.  Mag being replaced.  Major Shift Events:   pt passed her swallow study and started on a regular diet.  Pt eating without issue but only small amounts to start.  Grilled cheese sandwich she ate half of it and for supper she had a bowl of soup and ice cream.  Plan (Upcoming Events):  pt now a tele overflow.  Bp was elevated she now started back on her home antihypertensives.  Discharge/Transfer Needs:  Transfer to telemetry bed when bed is available.  Continue to work with PT.    Bedside Shift Report Completed : Y  Bedside Safety Check Completed:  JAMI

## 2019-02-10 NOTE — PROGRESS NOTES
[facility]    Hospitalist Progress Note  Name: Amanda Richardson    MRN: [id number]  Provider: Hermilo Yarbrough MD  Date of Service: 02/10/2019    Assessment & Plan   Summary of Stay: Amanda Richardson is a 53 year old female with past medical history significant for advanced MS, diabetes mellitus, hypertension, history of T-cell lymphoma status post treatment, chronically elevated WBC count, history of recurrent UTIs who presented with altered mental status increased weakness and somnolence.  Evaluation in the emergency room showed significant leukocytosis, hypotension, acute renal failure, obstructing left kidney stone with moderate hydronephrosis and respiratory failure with acidosis with hypercapnia.  She was admitted was admitted on 1/30/2019 with septic shock.  Patient received IV antibiotics and fluid resuscitation in the emergency room and underwent emergent cystoscopy and left retrograde pyelogram with stent placement on 1/30/2019.  CT also showed closed fracture of right femur.  She was initially placed on BiPAP for respiratory failure but perioperatively was intubated. she was admitted to ICU for further evaluation.    1/31/2019 patient developed wide-complex tachycardia appeared to be SVT failed to respond to vasovagal maneuvers.  Adenosine was given which showed rhythm to be atrial flutter she received cardioversion and was started on amiodarone.  Angiotensin II drip was initiated briefly for hypotension.  She is now off pressors as well as sedative meds.     Continues improved. Pt extubated successfully on 2/8. No able to eat per SLP. Pt interested in PT and OT. Complaining of some back pain this am, noting that her usual dose of Oxycodone is 15 mg po Q 6 hours.     Transferred to the medical floor today.      1.  Septic shock due to urinary tract source due to obstructing left ureteral stone (altered mental status, acute renal failure, hypotension, leukocytosis, acidosis, respiratory  failure).  She received aggressive fluid resuscitation along with initiation of pressors prior to being taken to the operating room.  She now is stable and shock is fully resolved.  --CT scan on admission showed obstructing stone with hydronephrosis and acute renal failure  --Status post cystoscopy and stent placement 1/30/2019.  Per report patient did have purulent drainage noted during cystoscopy.  --IV antibiotic started zosyn to cover for E. Coli isolate  --Blood and urine cultures positive for E coli  --extubated as of 2/8/19    2.  Hypercapnic respiratory failure present on admission. Resolved.   --Likely multifactorial from sepsis and chronic narcotic use  --Was intubated perioperatively and remained intubated following the procedure until 2/8.    3.  Acute renal failure with left hydronephrosis: Resolved and now at baseline  --Secondary to obstructive uropathy.  Creatinine on admission 2.25, now with creatinine 0.6.  --Status post a stent placement; anticipate outpatient follow-up for definitive treatment of the stone and removal of the stent.    4.  New Atrial flutter provoked by urosepsis.  Stable without recurrence.  --Status post DC cardioversion 1/31/2019  --Appreciate cardiology consult  --Cardiac echo when compared to prior study showed minimal deterioration of left ventricular systolic function.  EF 50-55%  --No anticoagulation for now per cardiology patient was in atrial flutter for less than 8 hours  --Switched to oral amiodarone for a total of 2 weeks (now on 200 mg daily).  Probably can stop at the time of discharge.    5.  Right femoral neck fracture  --Incidental finding on CT  --Patient has MS and at baseline is able to pivot herself  --at this time, pt notes that the right hip is painful with vigorous movement.  --Orthopedic surgery was consulted.  No intervention recommended at this time with plan for non-operative management during this hospitalization. It would be reasonable to consider  "operative repair later, when not acutely ill, due to patient's evident pain with even rolling in bed.     6.  Type 2 diabetes mellitus, \"borderline\"  --At baseline on metformin will hold for now  --Sliding scale insulin    7.  MS with associated chronic back pain  --Prior to admission on amitriptyline, baclofen, duloxetine, gabapentin and oxycodone  --PT evaluation    8.  Chronic leukocytosis: acute leukemia improving  --Patient has history of T-cell non-Hodgkin's lymphoma status post treatment with baseline elevated white cell count (mid-teens)    9.  H/o Hypertension  --resumed losartan and hydrochlorothiazide (not quite at pre-hospitalization doses)    10.  Bilateral inguinal wounds  --managed with topical wound cares.  --should likely have home health help with management of these upon discharge    DVT Prophylaxis: Pneumatic Compression Devices  Code Status: Full Code    Disposition: likely TCU with plan to ultimately return home.    OK for transfer to floor today. Will ask for tele, in view of the episode of A fib.       Interval History     Continues well.     -Data reviewed today: I reviewed all new labs and imaging reports over the last 24 hours. I personally reviewed no images or EKG's today.    Physical Exam   Temp: 98.7  F (37.1  C) Temp src: Oral BP: (!) 141/104 Pulse: 87 Heart Rate: 78 Resp: 12 SpO2: 90 % O2 Device: Nasal cannula Oxygen Delivery: 3 LPM  Vitals:    02/07/19 0635 02/09/19 0437 02/10/19 0410   Weight: 131.9 kg (290 lb 12.6 oz) 128.5 kg (283 lb 4.7 oz) 124.6 kg (274 lb 11.1 oz)     Vital Signs with Ranges  Temp:  [97.9  F (36.6  C)-99  F (37.2  C)] 98.7  F (37.1  C)  Pulse:  [] 87  Heart Rate:  [] 78  Resp:  [11-29] 12  BP: (139-167)/() 141/104  SpO2:  [82 %-99 %] 90 %  I/O last 3 completed shifts:  In: 2718.67 [P.O.:1340; I.V.:398.67; NG/GT:380]  Out: 5750 [Urine:5750]      GEN: alert, interactive.   HEENT:  Normocephalic/atraumatic, no scleral icterus, no nasal " discharge, mouth dry  CV: Tachycardic no murmur.    LUNGSleft basilar crackles.  Symmetric chest rise on inhalation noted.  ABD:  Active bowel sounds, soft, non-tender/non-distended.  No rebound/guarding/rigidity.   EXT: + edema.  No cyanosis.    SKIN:  Dry to touch, chronic stasis dermatitis and bilateral inguinal hidradenitis    Medications     sodium chloride Stopped (02/10/19 0900)     sodium chloride Stopped (02/09/19 1400)       amiodarone  200 mg Oral or Feeding Tube Daily     amitriptyline  100 mg Oral or Feeding Tube At Bedtime     aspirin  162 mg Oral or Feeding Tube Daily     atorvastatin  10 mg Oral or Feeding Tube Daily     baclofen  10 mg Oral or Feeding Tube TID     DULoxetine  60 mg Oral BID     gabapentin  600 mg Oral TID     heparin lock flush  5-10 mL Intracatheter Q24H     heparin  5,000 Units Subcutaneous Q8H     hydrochlorothiazide  12.5 mg Oral or Feeding Tube Daily     insulin aspart  1-7 Units Subcutaneous TID AC     insulin aspart  1-5 Units Subcutaneous At Bedtime     losartan  50 mg Oral or Feeding Tube Daily     metoprolol succinate ER  50 mg Oral Daily     multivitamin, therapeutic  1 tablet Oral Daily     oxyCODONE  15 mg Oral Q6H     piperacillin-tazobactam  3.375 g Intravenous Q6H     ranitidine  150 mg Oral BID     sodium chloride (PF)  10 mL Intracatheter Q7 Days     Data     No results for input(s): PH, PHV, PO2, PO2V, SAT, PCO2, PCO2V, HCO3, HCO3V in the last 168 hours.  Recent Labs   Lab 02/09/19  0540 02/08/19  0605 02/07/19  0550   WBC 24.5* 21.2* 19.1*   HGB 9.5* 9.5* 9.2*   HCT 32.0* 32.2* 31.7*   MCV 87 87 89   * 451* 381     Recent Labs   Lab 02/10/19  0615 02/09/19  0540 02/08/19  0605  02/07/19  0550   NA  --  143 142  --  142   POTASSIUM 3.7 3.8 3.7   < > 3.8   CHLORIDE  --  107 106  --  107   CO2  --  31 31  --  32   ANIONGAP  --  5 5  --  3   GLC  --  123* 132*  --  135*   BUN  --  20 23  --  26   CR  --  0.60 0.57  --  0.57   GFRESTIMATED  --  >90 >90  --  >90    GFRESTBLACK  --  >90 >90  --  >90   MARY  --  7.9* 7.7*  --  7.5*    < > = values in this interval not displayed.     Recent Labs   Lab 02/04/19  2130   CULT Cultured on the 2nd day of incubation:  Staphylococcus epidermidis  *  Critical Value/Significant Value, preliminary result only, called to and read back by  Claudia Marsh RN [id number] 2/6/19. MS    (Note)  POSITIVE for STAPHYLOCOCCUS EPIDERMIDIS and POSITIVE for the mecA  gene (resistant to methicillin) by [employer] multiplex nucleic acid  test. Final identification and antimicrobial susceptibility testing  will be verified by standard methods.      Specimen tested with Verigene multiplex, gram-positive blood culture  nucleic acid test for the following targets: Staph aureus, Staph  epidermidis, Staph lugdunensis, other Staph species, Enterococcus  faecalis, Enterococcus faecium, Streptococcus species, S. agalactiae,  S. anginosus grp., S. pneumoniae, S. pyogenes, Listeria sp., mecA  (methicillin resistance) and Randolph/B (vancomycin resistance).      Critical Value/Significant Value called to and read back by FREDO ESPINO 02.06.2019 AT 7.43AM,ZG    No growth     Recent Labs   Lab 02/09/19  0540 02/08/19  0605 02/07/19  0550   HGB 9.5* 9.5* 9.2*

## 2019-02-10 NOTE — PLAN OF CARE
ICU End of Shift Summary.  For vital signs and complete assessments, please see documentation flowsheets.     Pertinent assessments: pt vss stable over night bp htn at times when pt in pain but overall improved. Pt has good uop and in good spirits. Pt remains A&O x4. Pt helps turn her self still reliant on the lift to move from bed to chair ut working with pt to return to baseline pivoting.   Major Shift Events: no major events  Plan (Upcoming Events): transfer out of icu  Discharge/Transfer Needs: home health nurse for wound care and general well being.     Bedside Shift Report Completed :   Bedside Safety Check Completed:

## 2019-02-10 NOTE — PLAN OF CARE
ICU End of Shift Summary.  For vital signs and complete assessments, please see documentation flowsheets.     Pertinent assessments:  wound care done this am groin and coccyx improving/healing.  Pt up in chair then to cart to transfer to 3rd floor.  Major Shift Events:  magnesium and potassium replaced this am.  Kiofeed removed per Dr Yarbrough order.  Pt eating regular diet   Plan (Upcoming Events):  continue with PT to transition home or   TCU  Discharge/Transfer Needs: TBD    Bedside Shift Report Completed :   Bedside Safety Check Completed:

## 2019-02-10 NOTE — PLAN OF CARE
Discharge Planner PT     Patient plan for discharge: TCU  Current status: PT - Pt up in recliner upon arrival.  Pt transfers chair to bed via Liko lift ( sitting on EOB).  Pt was able to maintain sitting balance with 1 UE support.  Pt transfer sit to sidelying with max to mod assist of 1 and mod assist of 1. Max assist of 2 for scooting over in bed via lift sheet and positioned with pillows to place patient slightly on L side.  Pt was able to sit on EOB for ~ 5 min while removing Liko lift sling with pt shifting wt side to side and mod assist for support. Pt required max assist for lifting R LE to remove strap.  Barriers to return to prior living situation: Pt alone during day, current level of assist  Recommendations for discharge: TCU per plan established by the PT.    Rationale for recommendations: Pt significantly below baseline with all mobility.  Continued skilled PT (and OT) to increase overall strength/ROM, increase independence with bed mobility and transfers and increase activity tolerance to enable pt to return to her home at prior level of function or with least level of assist.       Entered by: Velvet Lozada 02/10/2019 3:15 PM

## 2019-02-11 ENCOUNTER — APPOINTMENT (OUTPATIENT)
Dept: PHYSICAL THERAPY | Facility: CLINIC | Age: 54
DRG: 853 | End: 2019-02-11
Payer: COMMERCIAL

## 2019-02-11 LAB
ANION GAP SERPL CALCULATED.3IONS-SCNC: 8 MMOL/L (ref 3–14)
BUN SERPL-MCNC: 20 MG/DL (ref 7–30)
CALCIUM SERPL-MCNC: 8.3 MG/DL (ref 8.5–10.1)
CHLORIDE SERPL-SCNC: 100 MMOL/L (ref 94–109)
CO2 SERPL-SCNC: 30 MMOL/L (ref 20–32)
CREAT SERPL-MCNC: 0.73 MG/DL (ref 0.52–1.04)
ERYTHROCYTE [DISTWIDTH] IN BLOOD BY AUTOMATED COUNT: 18.3 % (ref 10–15)
GFR SERPL CREATININE-BSD FRML MDRD: >90 ML/MIN/{1.73_M2}
GLUCOSE BLDC GLUCOMTR-MCNC: 119 MG/DL (ref 70–99)
GLUCOSE BLDC GLUCOMTR-MCNC: 142 MG/DL (ref 70–99)
GLUCOSE BLDC GLUCOMTR-MCNC: 97 MG/DL (ref 70–99)
GLUCOSE BLDC GLUCOMTR-MCNC: 99 MG/DL (ref 70–99)
GLUCOSE SERPL-MCNC: 108 MG/DL (ref 70–99)
HCT VFR BLD AUTO: 32.9 % (ref 35–47)
HGB BLD-MCNC: 10 G/DL (ref 11.7–15.7)
MAGNESIUM SERPL-MCNC: 2.2 MG/DL (ref 1.6–2.3)
MCH RBC QN AUTO: 25.9 PG (ref 26.5–33)
MCHC RBC AUTO-ENTMCNC: 30.4 G/DL (ref 31.5–36.5)
MCV RBC AUTO: 85 FL (ref 78–100)
PHOSPHATE SERPL-MCNC: 5 MG/DL (ref 2.5–4.5)
PLATELET # BLD AUTO: 586 10E9/L (ref 150–450)
POTASSIUM SERPL-SCNC: 3.7 MMOL/L (ref 3.4–5.3)
RBC # BLD AUTO: 3.86 10E12/L (ref 3.8–5.2)
SODIUM SERPL-SCNC: 138 MMOL/L (ref 133–144)
WBC # BLD AUTO: 20.2 10E9/L (ref 4–11)

## 2019-02-11 PROCEDURE — 83735 ASSAY OF MAGNESIUM: CPT | Performed by: INTERNAL MEDICINE

## 2019-02-11 PROCEDURE — 85027 COMPLETE CBC AUTOMATED: CPT | Performed by: INTERNAL MEDICINE

## 2019-02-11 PROCEDURE — 97110 THERAPEUTIC EXERCISES: CPT | Mod: GP | Performed by: PHYSICAL THERAPY ASSISTANT

## 2019-02-11 PROCEDURE — 97530 THERAPEUTIC ACTIVITIES: CPT | Mod: GP | Performed by: PHYSICAL THERAPY ASSISTANT

## 2019-02-11 PROCEDURE — 99233 SBSQ HOSP IP/OBS HIGH 50: CPT | Performed by: INTERNAL MEDICINE

## 2019-02-11 PROCEDURE — 25000128 H RX IP 250 OP 636: Performed by: INTERNAL MEDICINE

## 2019-02-11 PROCEDURE — 12000000 ZZH R&B MED SURG/OB

## 2019-02-11 PROCEDURE — 25000128 H RX IP 250 OP 636: Performed by: SURGERY

## 2019-02-11 PROCEDURE — 84100 ASSAY OF PHOSPHORUS: CPT | Performed by: INTERNAL MEDICINE

## 2019-02-11 PROCEDURE — 25000132 ZZH RX MED GY IP 250 OP 250 PS 637: Performed by: INTERNAL MEDICINE

## 2019-02-11 PROCEDURE — 80048 BASIC METABOLIC PNL TOTAL CA: CPT | Performed by: INTERNAL MEDICINE

## 2019-02-11 PROCEDURE — 25000132 ZZH RX MED GY IP 250 OP 250 PS 637: Performed by: SURGERY

## 2019-02-11 PROCEDURE — 00000146 ZZHCL STATISTIC GLUCOSE BY METER IP

## 2019-02-11 RX ORDER — FUROSEMIDE 10 MG/ML
20 INJECTION INTRAMUSCULAR; INTRAVENOUS ONCE
Status: COMPLETED | OUTPATIENT
Start: 2019-02-11 | End: 2019-02-11

## 2019-02-11 RX ADMIN — ASPIRIN 81 MG 162 MG: 81 TABLET ORAL at 08:50

## 2019-02-11 RX ADMIN — HYDROCHLOROTHIAZIDE 12.5 MG: 12.5 CAPSULE ORAL at 08:52

## 2019-02-11 RX ADMIN — GABAPENTIN 600 MG: 600 TABLET, FILM COATED ORAL at 08:51

## 2019-02-11 RX ADMIN — AMITRIPTYLINE HYDROCHLORIDE 100 MG: 50 TABLET, FILM COATED ORAL at 21:47

## 2019-02-11 RX ADMIN — POLYETHYLENE GLYCOL 3350 17 G: 17 POWDER, FOR SOLUTION ORAL at 11:45

## 2019-02-11 RX ADMIN — BACLOFEN 10 MG: 10 TABLET ORAL at 21:48

## 2019-02-11 RX ADMIN — SENNOSIDES AND DOCUSATE SODIUM 2 TABLET: 8.6; 5 TABLET ORAL at 21:47

## 2019-02-11 RX ADMIN — FUROSEMIDE 40 MG: 10 INJECTION, SOLUTION INTRAMUSCULAR; INTRAVENOUS at 08:51

## 2019-02-11 RX ADMIN — CEFTRIAXONE SODIUM 2 G: 2 INJECTION, POWDER, FOR SOLUTION INTRAMUSCULAR; INTRAVENOUS at 10:39

## 2019-02-11 RX ADMIN — OXYCODONE HYDROCHLORIDE 15 MG: 5 TABLET ORAL at 11:45

## 2019-02-11 RX ADMIN — GABAPENTIN 600 MG: 600 TABLET, FILM COATED ORAL at 15:56

## 2019-02-11 RX ADMIN — GABAPENTIN 600 MG: 600 TABLET, FILM COATED ORAL at 21:47

## 2019-02-11 RX ADMIN — ATORVASTATIN CALCIUM 10 MG: 10 TABLET, FILM COATED ORAL at 08:50

## 2019-02-11 RX ADMIN — BACLOFEN 10 MG: 10 TABLET ORAL at 15:56

## 2019-02-11 RX ADMIN — SALINE NASAL SPRAY 2 SPRAY: 1.5 SOLUTION NASAL at 13:37

## 2019-02-11 RX ADMIN — DULOXETINE HYDROCHLORIDE 60 MG: 30 CAPSULE, DELAYED RELEASE ORAL at 21:47

## 2019-02-11 RX ADMIN — FUROSEMIDE 20 MG: 10 INJECTION, SOLUTION INTRAMUSCULAR; INTRAVENOUS at 13:39

## 2019-02-11 RX ADMIN — METOPROLOL SUCCINATE 50 MG: 50 TABLET, EXTENDED RELEASE ORAL at 08:51

## 2019-02-11 RX ADMIN — POTASSIUM CHLORIDE 20 MEQ: 1500 TABLET, EXTENDED RELEASE ORAL at 08:53

## 2019-02-11 RX ADMIN — BACLOFEN 10 MG: 10 TABLET ORAL at 08:51

## 2019-02-11 RX ADMIN — DULOXETINE HYDROCHLORIDE 60 MG: 30 CAPSULE, DELAYED RELEASE ORAL at 08:51

## 2019-02-11 RX ADMIN — OXYCODONE HYDROCHLORIDE 15 MG: 5 TABLET ORAL at 06:19

## 2019-02-11 RX ADMIN — LOSARTAN POTASSIUM 50 MG: 50 TABLET, FILM COATED ORAL at 08:51

## 2019-02-11 RX ADMIN — SALINE NASAL SPRAY 1 SPRAY: 1.5 SOLUTION NASAL at 19:17

## 2019-02-11 RX ADMIN — AMIODARONE HYDROCHLORIDE 200 MG: 200 TABLET ORAL at 08:50

## 2019-02-11 RX ADMIN — HEPARIN SODIUM 5000 UNITS: 5000 INJECTION, SOLUTION INTRAVENOUS; SUBCUTANEOUS at 15:56

## 2019-02-11 RX ADMIN — HEPARIN SODIUM 5000 UNITS: 5000 INJECTION, SOLUTION INTRAVENOUS; SUBCUTANEOUS at 08:51

## 2019-02-11 RX ADMIN — OXYCODONE HYDROCHLORIDE 15 MG: 5 TABLET ORAL at 17:50

## 2019-02-11 ASSESSMENT — ACTIVITIES OF DAILY LIVING (ADL)
ADLS_ACUITY_SCORE: 27

## 2019-02-11 ASSESSMENT — MIFFLIN-ST. JEOR: SCORE: 1801.99

## 2019-02-11 NOTE — PROGRESS NOTES
[facility]  Hospitalist Progress Note  Name: Amanda Richardson    MRN: [id number]  Provider: Robert Little MD  Date of Service: 02/11/2019      Assessment & Plan   Summary of Stay: Amanda Richardson is a 53 year old female with past medical history significant for advanced MS, diabetes mellitus, hypertension, history of T-cell lymphoma status post treatment, chronically elevated WBC count, history of recurrent UTIs who presented with altered mental status increased weakness and somnolence.  Evaluation in the emergency room showed significant leukocytosis, hypotension, acute renal failure, obstructing left kidney stone with moderate hydronephrosis and respiratory failure with acidosis with hypercapnia.  She was admitted was admitted on 1/30/2019 with septic shock.  Patient received IV antibiotics and fluid resuscitation in the emergency room and underwent emergent cystoscopy and left retrograde pyelogram with stent placement on 1/30/2019.  CT also showed closed fracture of right femur.  She was initially placed on BiPAP for respiratory failure but perioperatively was intubated. she was admitted to ICU for further evaluation.    1/31/2019 patient developed wide-complex tachycardia appeared to be SVT failed to respond to vasovagal maneuvers.  Adenosine was given which showed rhythm to be atrial flutter she received cardioversion and was started on amiodarone.  Angiotensin II drip was initiated briefly for hypotension.  She is now off pressors as well as sedative meds.     Continues improved. Pt extubated successfully on 2/8. No able to eat per SLP. Pt interested in PT and OT.     She was transferred to the medical floor on 2/10/19.  At this point she remains admitted for ongoing IV antibiotics, trial of diuresis and I anticipate she will need a TCU stay.      1.  Septic shock likely due to urinary tract source (altered mental status, acute renal failure, hypotension, leukocytosis, acidosis,  respiratory failure).  She received aggressive fluid resuscitation along with initiation of pressors prior to being taken to the operating room.  She now is stable and shock is fully resolved.  --CT scan on admission showed obstructing stone with hydronephrosis and acute renal failure  --Status post cystoscopy and stent placement 1/30/2019.  Per report patient did have purulent drainage noted during cystoscopy.  --IV antibiotics: started zosyn to cover for E. Coli isolate, now on ceftriaxone.  --Blood and urine cultures positive for E coli  --extubated as of 2/8/19    2.  Hypercapnic respiratory failure present on admission. Resolved.   --Likely multifactorial from sepsis and chronic narcotic use  --Was intubated perioperatively and remained intubated following the procedure until 2/8.    3.  Acute renal failure with left hydronephrosis: Resolved and now at baseline  --Secondary to obstructive uropathy.  Creatinine on admission 2.25, now with creatinine 0.6.  --Status post a stent placement; anticipate outpatient follow-up for definitive treatment of the stone and removal of the stent.    4.  New Atrial flutter provoked by urosepsis.  Stable without recurrence.  --Status post DC cardioversion 1/31/2019  --Appreciate cardiology consult  --Cardiac echo when compared to prior study showed minimal deterioration of left ventricular systolic function.  EF 50-55%  --No anticoagulation for now per cardiology patient was in atrial flutter for less than 8 hours  --Switched to oral amiodarone for a total of 2 weeks (now on 200 mg daily).  Probably can stop at the time of discharge.    Right femoral neck fracture  --Incidental finding on CT.  Likely present for quite some time.  --Patient has MS and at baseline is able to pivot herself  --at this time, pt notes that the right hip is painful with vigorous movement.  --Orthopedic surgery was consulted.  No intervention recommended at this time, defer to their expertise re:  "ongoing management.    Type 2 diabetes mellitus, \"borderline\"  --At baseline on metformin will hold for now  --Sliding scale insulin    MS with associated chronic pain  --Prior to admission on amitriptyline, baclofen, duloxetine, gabapentin and oxycodone  --PT evaluation    Significant leukocytosis: Improving  --Patient has history of T-cell non-Hodgkin's lymphoma status post treatment with baseline elevated white cell count (mid-teens)    H/o Hypertension  --resumed losartan and hydrochlorothiazide (not quite at pre-hospitalization doses)    Bilateral inguinal wounds  --managed with topical wound cares.  --should likely have home help with management of these upon discharge    Persistent leukocytosis    DVT Prophylaxis: Pneumatic Compression Devices  Code Status: Full Code  Disposition: likely TCU with plan to ultimately return home.  ?2 more days.        Interval History       She was transferred out of the ICU and I assumed care today  Overall improving, advancing diet  Fluid overloaded, anticipate trial of gentle diuresis.      -Data reviewed today: I reviewed all new labs and imaging reports over the last 24 hours. I personally reviewed no images or EKG's today.    Physical Exam   Temp: 97.5  F (36.4  C) Temp src: Axillary BP: 119/43 Pulse: 87 Heart Rate: 76 Resp: 18 SpO2: (!) 89 % O2 Device: Nasal cannula Oxygen Delivery: 3 LPM  Vitals:    02/07/19 0635 02/09/19 0437 02/10/19 0410   Weight: 131.9 kg (290 lb 12.6 oz) 128.5 kg (283 lb 4.7 oz) 124.6 kg (274 lb 11.1 oz)     Vital Signs with Ranges  Temp:  [96.6  F (35.9  C)-98.7  F (37.1  C)] 97.5  F (36.4  C)  Pulse:  [87] 87  Heart Rate:  [65-85] 76  Resp:  [16-18] 18  BP: (119-142)/() 119/43  SpO2:  [89 %-92 %] 89 %  I/O last 3 completed shifts:  In: 600 [P.O.:550; I.V.:50]  Out: 4025 [Urine:4025]      GEN: alert, interactive.  Pleasant.  HEENT:  Normocephalic/atraumatic, no scleral icterus, no nasal discharge, mouth dry  CV: Tachycardic no murmur.  "   LUNGSleft basilar crackles.  Symmetric chest rise on inhalation noted.  ABD:  Active bowel sounds, soft, non-tender/non-distended.  No rebound/guarding/rigidity.   EXT: + edema.  No cyanosis.    SKIN:  Dry to touch, chronic stasis dermatitis and bilateral inguinal hidradenitis    Medications     sodium chloride Stopped (02/10/19 0900)     sodium chloride Stopped (02/09/19 1400)       amiodarone  200 mg Oral or Feeding Tube Daily     amitriptyline  100 mg Oral or Feeding Tube At Bedtime     aspirin  162 mg Oral or Feeding Tube Daily     atorvastatin  10 mg Oral or Feeding Tube Daily     baclofen  10 mg Oral or Feeding Tube TID     cefTRIAXone  2 g Intravenous Q24H     DULoxetine  60 mg Oral BID     furosemide  40 mg Intravenous Daily     gabapentin  600 mg Oral TID     heparin  5,000 Units Subcutaneous Q8H     hydrochlorothiazide  12.5 mg Oral or Feeding Tube Daily     insulin aspart  1-7 Units Subcutaneous TID AC     insulin aspart  1-5 Units Subcutaneous At Bedtime     losartan  50 mg Oral or Feeding Tube Daily     metoprolol succinate ER  50 mg Oral Daily     oxyCODONE  15 mg Oral Q6H     senna-docusate  2 tablet Oral At Bedtime     sodium chloride (PF)  10 mL Intracatheter Q7 Days     Data     No results for input(s): PH, PHV, PO2, PO2V, SAT, PCO2, PCO2V, HCO3, HCO3V in the last 168 hours.  Recent Labs   Lab 02/11/19  0625 02/09/19  0540 02/08/19  0605   WBC 20.2* 24.5* 21.2*   HGB 10.0* 9.5* 9.5*   HCT 32.9* 32.0* 32.2*   MCV 85 87 87   * 473* 451*     Recent Labs   Lab 02/11/19  0625 02/10/19  0615 02/09/19  0540 02/08/19  0605     --  143 142   POTASSIUM 3.7 3.7 3.8 3.7   CHLORIDE 100  --  107 106   CO2 30  --  31 31   ANIONGAP 8  --  5 5   *  --  123* 132*   BUN 20  --  20 23   CR 0.73  --  0.60 0.57   GFRESTIMATED >90  --  >90 >90   GFRESTBLACK >90  --  >90 >90   MARY 8.3*  --  7.9* 7.7*     Recent Labs   Lab 02/04/19  2130   CULT Cultured on the 2nd day of incubation:  Staphylococcus  epidermidis  *  Critical Value/Significant Value, preliminary result only, called to and read back by  Caludia Marsh, FREDO [id number] 2/6/19. MS    (Note)  POSITIVE for STAPHYLOCOCCUS EPIDERMIDIS and POSITIVE for the mecA  gene (resistant to methicillin) by [email or web address] multiplex nucleic acid  test. Final identification and antimicrobial susceptibility testing  will be verified by standard methods.      Specimen tested with [employer] multiplex, gram-positive blood culture  nucleic acid test for the following targets: Staph aureus, Staph  epidermidis, Staph lugdunensis, other Staph species, Enterococcus  faecalis, Enterococcus faecium, Streptococcus species, S. agalactiae,  S. anginosus grp., S. pneumoniae, S. pyogenes, Listeria sp., mecA  (methicillin resistance) and Randolph/B (vancomycin resistance).      Critical Value/Significant Value called to and read back by FREDO ESPINO 02.06.2019 AT 7.43AM,ZG    No growth     Recent Labs   Lab 02/11/19  0625 02/09/19  0540 02/08/19  0605   HGB 10.0* 9.5* 9.5*

## 2019-02-11 NOTE — PLAN OF CARE
VS stable. 90% on 2.5L. Diminished LS with productive cough. +2 edema to BLE. Complained of generalized pain, pain in back and right hip and pain med's given. Tele is sinus rhythm. Slept well throughout the night.

## 2019-02-11 NOTE — PLAN OF CARE
Discharge Planner PT     Patient plan for discharge: TCU  Current status: PT - Pt in bed upon arrival.  Pt agreeable to PT. Pt transfers supine to sidelying with mod assist - min assist of 2 with assisting flexing R knee prior  to rolling to L side.  Pt transfer sidelying to sit wiith mod - min assist of 1 with pt utilizing bed rail.  Pt was able to maintain sitting balance with SBA - supervision. Pt was able to scoot to EOB &place B feet onto floor.  Bed was elevated with pt's hand on ww was able to perform sit to stand with mod assist of 2.  Pt was able to maintain standiing for ~ 4 sec while liko lift sling was placed under pt.  Pt was transferred from bed to chair via Liko lift.   Barriers to return to prior living situation: Pt alone during day, current level of assist  Recommendations for discharge: TCU per plan established by the PT.    Rationale for recommendations: Pt significantly below baseline with all mobility.  Continued skilled PT (and OT) to increase overall strength/ROM, increase independence with bed mobility and transfers and increase activity tolerance to enable pt to return to her home at prior level of function or with least level of assist.       Entered by: Velvet Lozada 02/11/2019 11:26 AM

## 2019-02-11 NOTE — PLAN OF CARE
Neuro: A&O x 4.   Resp: O2 3L- unable to wean off oxygen- ocean spray d/t pt feeing congested   Cardiac: NSR on tele. Edema BLE- encourage pt to elevate  GI/: ramos in place with adequate output. BG monitoring  Mobility: Lift to transfer- baseline pt is w/c bound with pivot transfers.   Pain: Scheduled oxycodone  IV: Triple lumen PICC line SL  Skin: skin care done to coccyx, and bilateral groin areas. Dressing CDI L shin  Plan: TCU vs home

## 2019-02-11 NOTE — PROGRESS NOTES
CLINICAL NUTRITION SERVICES - REASSESSMENT NOTE      Recommendations Ordered by Registered Dietitian (RD):     Continue oral nutrition supplements   Future/Additional Recommendations:     Oral nutrition supplements if intake declines   Malnutrition:   Patient does not meet two of the criteria necessary for diagnosing malnutrition     EVALUATION OF PROGRESS TOWARD GOALS/NEW FINDINGS   Progress towards goals will be monitored and evaluated per protocol and Practice Guidelines    Diet order: Regular  Per flow sheet review, % intake for documented meals. Patient reports appetite continuing to improve, some early satiety but feels her portions are more appropriate then compared to intake PTA      Extubated 2/8, SLP advanced diet and FT removed 2/9 - well tolerated prior to removal (hypocaloric, high protein formula)    Wounds/WOCN 2/8: related to moisture, and chronic wounds but not tunneling    Weight: 122 kg - down ~5 kg since admit    Meds: reviewed    Labs:   Recent Labs   Lab Test 02/11/19  0625 02/10/19  0615 02/09/19  0540 02/08/19  0605 02/08/19  0234   POTASSIUM 3.7 3.7 3.8 3.7 3.5     Recent Labs   Lab Test 02/11/19  0625 02/10/19  0615 02/09/19  0540 02/07/19  0550 02/06/19  0515   PHOS 5.0* 4.0 3.2 3.1 3.1     Recent Labs   Lab Test 02/11/19  0625 02/10/19  0615 02/09/19  0540 02/08/19  0605 02/08/19  0234   MAG 2.2 1.9 1.8 2.1 1.9     Recent Labs   Lab Test 02/11/19  0625 02/09/19  0540 02/08/19  0605 02/07/19  0550 02/06/19  0515    143 142 142 145*     Recent Labs   Lab Test 02/11/19  0625 02/09/19  0540 02/08/19  0605 02/07/19  0550 02/06/19  0515   CR 0.73 0.60 0.57 0.57 0.73     Recent Labs   Lab 02/11/19  0625 02/09/19  0540 02/08/19  0605 02/07/19  0550 02/06/19  0515 02/05/19  0420   * 123* 132* 135* 136* 143*     Lab Results   Component Value Date    A1C 6.6 02/09/2019    A1C 6.4 01/30/2019    A1C 6.8 06/20/2018    A1C 6.5 10/02/2017    A1C 6.6 05/02/2016     Previous Goals:   EN  to meet % daily needs while NPO.  Evaluation: Completed    Previous Nutrition Diagnosis:   Increased nutrient needs (protein) related to wound healing and high-protein obesity guidelines as evidenced by wounds on buttocks and groin area, and BMI > 40.  Evaluation: Ongoing/completed      MALNUTRITION (Assessed 2/11)  % Weight Loss: Does not meet criteria - some wt loss but likely fluid related  % Intake: Decreased intake does not meet criteria   Subcutaneous Fat Loss: None observed   Muscle Loss: Mild temporal scooping; clavicle, scapular region: mild-moderate depletion - baseline LBM masked by adiposity  Fluid Retention: none     Malnutrition Diagnosis: patient does not meet malnutrition criteria during admit    CURRENT NUTRITION DIAGNOSIS  Predicted inadequate nutrient intake (protein energy) related to prolonged hospitalization, s/p extubation    INTERVENTIONS  Recommendations / Nutrition Prescription  Continue diet as ordered     Implementation  Nutrition education: encouraged balanced meals, protein source at each meal  Collaboration and Referral of care: Discussed patient during interdisciplinary care rounds this morning    Goals  Patient to consume >/=75% of meals TID      MONITORING AND EVALUATION:  Progress towards goals will be monitored and evaluated per protocol and Practice Guidelines      Sia Langley RDN, LD, CNSC  Pager - 3rd floor/ICU: [phone]  Pager - All other floors: [phone]  Pager - Weekend/holiday: [phone]  Office: [phone]

## 2019-02-12 ENCOUNTER — APPOINTMENT (OUTPATIENT)
Dept: GENERAL RADIOLOGY | Facility: CLINIC | Age: 54
DRG: 853 | End: 2019-02-12
Attending: INTERNAL MEDICINE
Payer: COMMERCIAL

## 2019-02-12 ENCOUNTER — APPOINTMENT (OUTPATIENT)
Dept: PHYSICAL THERAPY | Facility: CLINIC | Age: 54
DRG: 853 | End: 2019-02-12
Payer: COMMERCIAL

## 2019-02-12 LAB
ALBUMIN SERPL-MCNC: 2.1 G/DL (ref 3.4–5)
ALP SERPL-CCNC: 174 U/L (ref 40–150)
ALT SERPL W P-5'-P-CCNC: 35 U/L (ref 0–50)
ANION GAP SERPL CALCULATED.3IONS-SCNC: 6 MMOL/L (ref 3–14)
AST SERPL W P-5'-P-CCNC: 22 U/L (ref 0–45)
BILIRUB SERPL-MCNC: 0.4 MG/DL (ref 0.2–1.3)
BUN SERPL-MCNC: 20 MG/DL (ref 7–30)
CALCIUM SERPL-MCNC: 8 MG/DL (ref 8.5–10.1)
CHLORIDE SERPL-SCNC: 102 MMOL/L (ref 94–109)
CO2 SERPL-SCNC: 31 MMOL/L (ref 20–32)
CREAT SERPL-MCNC: 0.72 MG/DL (ref 0.52–1.04)
GFR SERPL CREATININE-BSD FRML MDRD: >90 ML/MIN/{1.73_M2}
GLUCOSE BLDC GLUCOMTR-MCNC: 103 MG/DL (ref 70–99)
GLUCOSE BLDC GLUCOMTR-MCNC: 110 MG/DL (ref 70–99)
GLUCOSE BLDC GLUCOMTR-MCNC: 187 MG/DL (ref 70–99)
GLUCOSE BLDC GLUCOMTR-MCNC: 95 MG/DL (ref 70–99)
GLUCOSE BLDC GLUCOMTR-MCNC: 99 MG/DL (ref 70–99)
GLUCOSE SERPL-MCNC: 96 MG/DL (ref 70–99)
POTASSIUM SERPL-SCNC: 3.8 MMOL/L (ref 3.4–5.3)
PROT SERPL-MCNC: 7 G/DL (ref 6.8–8.8)
SODIUM SERPL-SCNC: 139 MMOL/L (ref 133–144)

## 2019-02-12 PROCEDURE — 25000128 H RX IP 250 OP 636: Performed by: SURGERY

## 2019-02-12 PROCEDURE — 12000000 ZZH R&B MED SURG/OB

## 2019-02-12 PROCEDURE — 97530 THERAPEUTIC ACTIVITIES: CPT | Mod: GP

## 2019-02-12 PROCEDURE — 25000128 H RX IP 250 OP 636: Performed by: INTERNAL MEDICINE

## 2019-02-12 PROCEDURE — 80053 COMPREHEN METABOLIC PANEL: CPT | Performed by: INTERNAL MEDICINE

## 2019-02-12 PROCEDURE — 00000146 ZZHCL STATISTIC GLUCOSE BY METER IP

## 2019-02-12 PROCEDURE — 25000132 ZZH RX MED GY IP 250 OP 250 PS 637: Performed by: INTERNAL MEDICINE

## 2019-02-12 PROCEDURE — 99233 SBSQ HOSP IP/OBS HIGH 50: CPT | Performed by: INTERNAL MEDICINE

## 2019-02-12 PROCEDURE — 97110 THERAPEUTIC EXERCISES: CPT | Mod: GP

## 2019-02-12 PROCEDURE — 71045 X-RAY EXAM CHEST 1 VIEW: CPT

## 2019-02-12 RX ORDER — IPRATROPIUM BROMIDE AND ALBUTEROL SULFATE 2.5; .5 MG/3ML; MG/3ML
3 SOLUTION RESPIRATORY (INHALATION)
Status: DISCONTINUED | OUTPATIENT
Start: 2019-02-12 | End: 2019-02-27 | Stop reason: HOSPADM

## 2019-02-12 RX ORDER — FUROSEMIDE 10 MG/ML
40 INJECTION INTRAMUSCULAR; INTRAVENOUS 2 TIMES DAILY
Status: COMPLETED | OUTPATIENT
Start: 2019-02-12 | End: 2019-02-13

## 2019-02-12 RX ADMIN — BACLOFEN 10 MG: 10 TABLET ORAL at 21:37

## 2019-02-12 RX ADMIN — LOSARTAN POTASSIUM 50 MG: 50 TABLET, FILM COATED ORAL at 08:22

## 2019-02-12 RX ADMIN — AMIODARONE HYDROCHLORIDE 200 MG: 200 TABLET ORAL at 08:23

## 2019-02-12 RX ADMIN — SALINE NASAL SPRAY 2 SPRAY: 1.5 SOLUTION NASAL at 07:28

## 2019-02-12 RX ADMIN — SENNOSIDES AND DOCUSATE SODIUM 2 TABLET: 8.6; 5 TABLET ORAL at 21:37

## 2019-02-12 RX ADMIN — CEFTRIAXONE SODIUM 2 G: 2 INJECTION, POWDER, FOR SOLUTION INTRAMUSCULAR; INTRAVENOUS at 12:16

## 2019-02-12 RX ADMIN — GABAPENTIN 600 MG: 600 TABLET, FILM COATED ORAL at 17:19

## 2019-02-12 RX ADMIN — ATORVASTATIN CALCIUM 10 MG: 10 TABLET, FILM COATED ORAL at 08:23

## 2019-02-12 RX ADMIN — ASPIRIN 81 MG 162 MG: 81 TABLET ORAL at 08:22

## 2019-02-12 RX ADMIN — DULOXETINE HYDROCHLORIDE 60 MG: 30 CAPSULE, DELAYED RELEASE ORAL at 08:22

## 2019-02-12 RX ADMIN — DULOXETINE HYDROCHLORIDE 60 MG: 30 CAPSULE, DELAYED RELEASE ORAL at 21:37

## 2019-02-12 RX ADMIN — GABAPENTIN 600 MG: 600 TABLET, FILM COATED ORAL at 21:37

## 2019-02-12 RX ADMIN — HEPARIN SODIUM 5000 UNITS: 5000 INJECTION, SOLUTION INTRAVENOUS; SUBCUTANEOUS at 00:36

## 2019-02-12 RX ADMIN — BACLOFEN 10 MG: 10 TABLET ORAL at 17:19

## 2019-02-12 RX ADMIN — FUROSEMIDE 40 MG: 10 INJECTION, SOLUTION INTRAMUSCULAR; INTRAVENOUS at 12:16

## 2019-02-12 RX ADMIN — OXYCODONE HYDROCHLORIDE 15 MG: 5 TABLET ORAL at 12:16

## 2019-02-12 RX ADMIN — OXYCODONE HYDROCHLORIDE 15 MG: 5 TABLET ORAL at 18:15

## 2019-02-12 RX ADMIN — METOPROLOL SUCCINATE 50 MG: 50 TABLET, EXTENDED RELEASE ORAL at 08:23

## 2019-02-12 RX ADMIN — FUROSEMIDE 40 MG: 10 INJECTION, SOLUTION INTRAMUSCULAR; INTRAVENOUS at 21:37

## 2019-02-12 RX ADMIN — OXYCODONE HYDROCHLORIDE 15 MG: 5 TABLET ORAL at 05:58

## 2019-02-12 RX ADMIN — GABAPENTIN 600 MG: 600 TABLET, FILM COATED ORAL at 08:22

## 2019-02-12 RX ADMIN — OXYCODONE HYDROCHLORIDE 15 MG: 5 TABLET ORAL at 00:36

## 2019-02-12 RX ADMIN — FUROSEMIDE 40 MG: 10 INJECTION, SOLUTION INTRAMUSCULAR; INTRAVENOUS at 08:22

## 2019-02-12 RX ADMIN — BACLOFEN 10 MG: 10 TABLET ORAL at 08:22

## 2019-02-12 RX ADMIN — HEPARIN SODIUM 5000 UNITS: 5000 INJECTION, SOLUTION INTRAVENOUS; SUBCUTANEOUS at 17:20

## 2019-02-12 RX ADMIN — AMITRIPTYLINE HYDROCHLORIDE 100 MG: 50 TABLET, FILM COATED ORAL at 21:37

## 2019-02-12 RX ADMIN — HYDROCHLOROTHIAZIDE 12.5 MG: 12.5 CAPSULE ORAL at 08:22

## 2019-02-12 RX ADMIN — HEPARIN SODIUM 5000 UNITS: 5000 INJECTION, SOLUTION INTRAVENOUS; SUBCUTANEOUS at 08:22

## 2019-02-12 RX ADMIN — DOCUSATE SODIUM 100 MG: 100 CAPSULE, LIQUID FILLED ORAL at 22:40

## 2019-02-12 ASSESSMENT — ACTIVITIES OF DAILY LIVING (ADL)
ADLS_ACUITY_SCORE: 27
ADLS_ACUITY_SCORE: 25
ADLS_ACUITY_SCORE: 29
ADLS_ACUITY_SCORE: 27

## 2019-02-12 ASSESSMENT — MIFFLIN-ST. JEOR: SCORE: 1721.25

## 2019-02-12 NOTE — PLAN OF CARE
A&OX4. LS diminished but clear on all lung fields. Oxygen 90% on 3L. Assist of 2 with lift for transferring. Edema to BLE. Bobo in place and patent with good output.BG 99 and 119. Skin care to groin and coccyx. Up to recliner for position change. Continue POC and monitoring.

## 2019-02-12 NOTE — PLAN OF CARE
Pt A&Ox4, up w/lift, tele SR w/1st degree AVB, /99, PICC dressing reinforced, upon morning rounds pt O2 sats were in mid 80's, oxygen bumped up to 5lpm sating 90-91%, also congested cough, md ordered additional IV lasix, and chest xray -pending, writer encouraged cough and deep breathing more frequently and IS use, pt makes needs known, will continue POC.

## 2019-02-12 NOTE — PLAN OF CARE
Discharge Planner PT     Patient plan for discharge: TCU  Current status: Patient up in bedside chair upon arrival, per nursing, patient transferred dependently via overhead lift.  Patient participated in repeated trials of sit to stand from bedside chair with 2WW and mod A from therapist, verbal cueing for technique.  Following 5 trials, patient able to clear chair, however unable to fully transition to standing with 2WW.  Patient participated in LE strengthening exercises in sitting.    Barriers to return to prior living situation: Pt alone during day, current level of assist  Recommendations for discharge: TCU, will require at least WC transport   Rationale for recommendations: Pt significantly below baseline with all mobility.  Continued skilled PT (and OT) to increase overall strength/ROM, increase independence with bed mobility and transfers and increase activity tolerance to enable pt to return to her home at prior level of function or with least level of assist.       Entered by: Shannan Carr 02/12/2019 10:04 AM

## 2019-02-12 NOTE — PLAN OF CARE
Pt. A&Ox4, up with assist of 2 and lift,  TQ2, Tele: SR with 1st degree block. PICC to left arm intact,  BGL at 5701=126, O2 at 3L with sat's at 91%, Lung sounds clear, diminished. Pt. on oxycodone for pain control, pt. c/o generalized back pain. Pt. on Rocephin for UTI and on Amiodarone for A.Fib. Pt. denies any needs at this time. Will continue with POC.

## 2019-02-12 NOTE — PROGRESS NOTES
[facility]  Hospitalist Progress Note  Name: Amanda Richardson    MRN: [id number]  Provider: Robert Little MD  Date of Service: 02/12/2019      Assessment & Plan   Summary of Stay: Amanda Richardson is a 53 year old female with past medical history significant for advanced MS, diabetes mellitus, hypertension, history of T-cell lymphoma status post treatment, chronically elevated WBC count, history of recurrent UTIs who presented with altered mental status increased weakness and somnolence.  Evaluation in the emergency room showed significant leukocytosis, hypotension, acute renal failure, obstructing left kidney stone with moderate hydronephrosis and respiratory failure with acidosis with hypercapnia.  She was admitted was admitted on 1/30/2019 with septic shock.  Patient received IV antibiotics and fluid resuscitation in the emergency room and underwent emergent cystoscopy and left retrograde pyelogram with stent placement on 1/30/2019.  CT also showed closed fracture of right femur.  She was initially placed on BiPAP for respiratory failure but perioperatively was intubated. she was admitted to ICU for further evaluation.    1/31/2019 patient developed wide-complex tachycardia appeared to be SVT failed to respond to vasovagal maneuvers.  Adenosine was given which showed rhythm to be atrial flutter she received cardioversion and was started on amiodarone.  Angiotensin II drip was initiated briefly for hypotension.  She is now off pressors as well as sedative meds.     Continues to improve. Pt extubated successfully on 2/8. Now able to eat per SLP. Pt interested in PT and OT.     She was transferred to the medical floor on 2/10/19.  At this point she remains admitted for ongoing IV antibiotics, trial of diuresis and I anticipate she will need a TCU stay.        1.  Septic shock likely due to urinary tract source (altered mental status, acute renal failure, hypotension, leukocytosis,  acidosis, respiratory failure).  She received aggressive fluid resuscitation along with initiation of pressors prior to being taken to the operating room.  She now is stable and shock is fully resolved.  --CT scan on admission showed obstructing stone with hydronephrosis and acute renal failure  --Status post cystoscopy and stent placement 1/30/2019.  Per report patient did have purulent drainage noted during cystoscopy.  --IV antibiotics: started zosyn to cover for E. Coli isolate, now on ceftriaxone.  Started on 1/30.  We will plan on 14 days total.  End date will be 2/13.  --Blood and urine cultures positive for E coli  --extubated as of 2/8/19    2.  Hypercapnic respiratory failure present on admission.  Still with some hypoxia likely related to volume overload from resolved sepsis and associated necessary management.  --Likely multifactorial from sepsis and chronic narcotic use  --Was intubated perioperatively and remained intubated following the procedure until 2/8.  --Continue IV diuretics again today, recheck tomorrow.    3.  Acute renal failure with left hydronephrosis: Resolved and now at baseline  --Secondary to obstructive uropathy.  Creatinine on admission 2.25, now with creatinine 0.6.  --Status post a stent placement; anticipate outpatient follow-up for definitive treatment of the stone and removal of the stent.    4.  New Atrial flutter provoked by urosepsis.  Stable without recurrence.  --Status post DC cardioversion 1/31/2019  --Appreciate cardiology consult  --Cardiac echo when compared to prior study showed minimal deterioration of left ventricular systolic function.  EF 50-55%  --No anticoagulation for now per cardiology patient was in atrial flutter for less than 8 hours  --Switched to oral amiodarone for a total of 2 weeks (now on 200 mg daily).  Probably can stop at the time of discharge.    Right femoral neck fracture  --Incidental finding on CT.  Likely present for quite some  "time.  --Patient has MS and at baseline is able to pivot herself  --at this time, pt notes that the right hip is painful with vigorous movement.  --Orthopedic surgery was consulted.  No intervention recommended at this time, defer to their expertise re: ongoing management.    Type 2 diabetes mellitus, \"borderline\"  --At baseline on metformin will hold for now  --Sliding scale insulin    MS with associated chronic pain  --Prior to admission on amitriptyline, baclofen, duloxetine, gabapentin and oxycodone  --PT evaluation    Significant leukocytosis: Improving  --Patient has history of T-cell non-Hodgkin's lymphoma status post treatment with baseline elevated white cell count (mid-teens)    H/o Hypertension  --resumed losartan and hydrochlorothiazide (not quite at pre-hospitalization doses)    Bilateral inguinal wounds  --managed with topical wound cares.  --should likely have home help with management of these upon discharge    Persistent leukocytosis    DVT Prophylaxis: Pneumatic Compression Devices  Code Status: Full Code  Disposition: likely TCU with plan to ultimately return home.  ?2 more days.        Interval History     Feels well today, up in chair  Completing antibiotics  Continuing IV diuretics today, still on 4-5 L supplemental oxygen per minute  Checking a chest x-ray  Otherwise doing well      -Data reviewed today: I reviewed all new labs and imaging reports over the last 24 hours. I personally reviewed no images or EKG's today.    Physical Exam   Temp: 97.7  F (36.5  C) Temp src: Oral BP: 113/56 Pulse: 76 Heart Rate: 84 Resp: 16 SpO2: 92 % O2 Device: Nasal cannula Oxygen Delivery: 5 LPM  Vitals:    02/10/19 0410 02/11/19 0917 02/12/19 0614   Weight: 124.6 kg (274 lb 11.1 oz) 122.2 kg (269 lb 6.4 oz) 114.1 kg (251 lb 9.6 oz)     Vital Signs with Ranges  Temp:  [97.2  F (36.2  C)-98.8  F (37.1  C)] 97.7  F (36.5  C)  Pulse:  [76-88] 76  Heart Rate:  [78-84] 84  Resp:  [16-18] 16  BP: (110-128)/(56-63) " 113/56  FiO2 (%):  [3 %] 3 %  SpO2:  [85 %-92 %] 92 %  I/O last 3 completed shifts:  In: 956 [P.O.:956]  Out: 2925 [Urine:2925]      GEN: alert, interactive.  Pleasant.  Nontoxic.  HEENT:  Normocephalic/atraumatic, no scleral icterus, no nasal discharge, mouth dry  CV: Tachycardic no murmur.    LUNGSleft basilar crackles.  Symmetric chest rise on inhalation noted.  ABD:  Active bowel sounds, soft, non-tender/non-distended.  No rebound/guarding/rigidity.   EXT: 3+ + lower extremity edema.  No cyanosis.    SKIN:  Dry to touch, chronic stasis dermatitis    Medications     sodium chloride Stopped (02/10/19 0900)     sodium chloride Stopped (02/09/19 1400)       amiodarone  200 mg Oral or Feeding Tube Daily     amitriptyline  100 mg Oral or Feeding Tube At Bedtime     aspirin  162 mg Oral or Feeding Tube Daily     atorvastatin  10 mg Oral or Feeding Tube Daily     baclofen  10 mg Oral or Feeding Tube TID     cefTRIAXone  2 g Intravenous Q24H     DULoxetine  60 mg Oral BID     furosemide  40 mg Intravenous BID     gabapentin  600 mg Oral TID     heparin  5,000 Units Subcutaneous Q8H     hydrochlorothiazide  12.5 mg Oral or Feeding Tube Daily     insulin aspart  1-7 Units Subcutaneous TID AC     insulin aspart  1-5 Units Subcutaneous At Bedtime     losartan  50 mg Oral or Feeding Tube Daily     metoprolol succinate ER  50 mg Oral Daily     oxyCODONE  15 mg Oral Q6H     senna-docusate  2 tablet Oral At Bedtime     sodium chloride (PF)  10 mL Intracatheter Q7 Days     Data     No results for input(s): PH, PHV, PO2, PO2V, SAT, PCO2, PCO2V, HCO3, HCO3V in the last 168 hours.  Recent Labs   Lab 02/11/19  0625 02/09/19  0540 02/08/19  0605   WBC 20.2* 24.5* 21.2*   HGB 10.0* 9.5* 9.5*   HCT 32.9* 32.0* 32.2*   MCV 85 87 87   * 473* 451*     Recent Labs   Lab 02/12/19  0555 02/11/19  0625 02/10/19  0615 02/09/19  0540    138  --  143   POTASSIUM 3.8 3.7 3.7 3.8   CHLORIDE 102 100  --  107   CO2 31 30  --  31    ANIONGAP 6 8  --  5   GLC 96 108*  --  123*   BUN 20 20  --  20   CR 0.72 0.73  --  0.60   GFRESTIMATED >90 >90  --  >90   GFRESTBLACK >90 >90  --  >90   MARY 8.0* 8.3*  --  7.9*     No results for input(s): CULT in the last 168 hours.  Recent Labs   Lab 02/11/19  0625 02/09/19  0540 02/08/19  0605   HGB 10.0* 9.5* 9.5*

## 2019-02-13 ENCOUNTER — APPOINTMENT (OUTPATIENT)
Dept: PHYSICAL THERAPY | Facility: CLINIC | Age: 54
DRG: 853 | End: 2019-02-13
Payer: COMMERCIAL

## 2019-02-13 LAB
ANION GAP SERPL CALCULATED.3IONS-SCNC: 7 MMOL/L (ref 3–14)
BASOPHILS # BLD AUTO: 0.1 10E9/L (ref 0–0.2)
BASOPHILS NFR BLD AUTO: 0.7 %
BUN SERPL-MCNC: 21 MG/DL (ref 7–30)
CALCIUM SERPL-MCNC: 8.1 MG/DL (ref 8.5–10.1)
CHLORIDE SERPL-SCNC: 98 MMOL/L (ref 94–109)
CO2 SERPL-SCNC: 33 MMOL/L (ref 20–32)
CREAT SERPL-MCNC: 0.78 MG/DL (ref 0.52–1.04)
DIFFERENTIAL METHOD BLD: ABNORMAL
EOSINOPHIL # BLD AUTO: 0.3 10E9/L (ref 0–0.7)
EOSINOPHIL NFR BLD AUTO: 2.4 %
ERYTHROCYTE [DISTWIDTH] IN BLOOD BY AUTOMATED COUNT: 18.6 % (ref 10–15)
GFR SERPL CREATININE-BSD FRML MDRD: 85 ML/MIN/{1.73_M2}
GLUCOSE BLDC GLUCOMTR-MCNC: 107 MG/DL (ref 70–99)
GLUCOSE BLDC GLUCOMTR-MCNC: 109 MG/DL (ref 70–99)
GLUCOSE BLDC GLUCOMTR-MCNC: 122 MG/DL (ref 70–99)
GLUCOSE BLDC GLUCOMTR-MCNC: 93 MG/DL (ref 70–99)
GLUCOSE BLDC GLUCOMTR-MCNC: 99 MG/DL (ref 70–99)
GLUCOSE SERPL-MCNC: 135 MG/DL (ref 70–99)
HCT VFR BLD AUTO: 32.4 % (ref 35–47)
HGB BLD-MCNC: 9.8 G/DL (ref 11.7–15.7)
IMM GRANULOCYTES # BLD: 0.2 10E9/L (ref 0–0.4)
IMM GRANULOCYTES NFR BLD: 1.6 %
LYMPHOCYTES # BLD AUTO: 1.4 10E9/L (ref 0.8–5.3)
LYMPHOCYTES NFR BLD AUTO: 10.6 %
MAGNESIUM SERPL-MCNC: 1.9 MG/DL (ref 1.6–2.3)
MCH RBC QN AUTO: 25.9 PG (ref 26.5–33)
MCHC RBC AUTO-ENTMCNC: 30.2 G/DL (ref 31.5–36.5)
MCV RBC AUTO: 86 FL (ref 78–100)
MONOCYTES # BLD AUTO: 0.9 10E9/L (ref 0–1.3)
MONOCYTES NFR BLD AUTO: 6.6 %
NEUTROPHILS # BLD AUTO: 10.6 10E9/L (ref 1.6–8.3)
NEUTROPHILS NFR BLD AUTO: 78.1 %
NRBC # BLD AUTO: 0 10*3/UL
NRBC BLD AUTO-RTO: 0 /100
PLATELET # BLD AUTO: 607 10E9/L (ref 150–450)
POTASSIUM SERPL-SCNC: 3.8 MMOL/L (ref 3.4–5.3)
RBC # BLD AUTO: 3.79 10E12/L (ref 3.8–5.2)
SODIUM SERPL-SCNC: 138 MMOL/L (ref 133–144)
WBC # BLD AUTO: 13.6 10E9/L (ref 4–11)

## 2019-02-13 PROCEDURE — 83735 ASSAY OF MAGNESIUM: CPT | Performed by: INTERNAL MEDICINE

## 2019-02-13 PROCEDURE — 25000128 H RX IP 250 OP 636: Performed by: INTERNAL MEDICINE

## 2019-02-13 PROCEDURE — 25000125 ZZHC RX 250: Performed by: SURGERY

## 2019-02-13 PROCEDURE — 97530 THERAPEUTIC ACTIVITIES: CPT | Mod: GP | Performed by: PHYSICAL THERAPY ASSISTANT

## 2019-02-13 PROCEDURE — 25000132 ZZH RX MED GY IP 250 OP 250 PS 637: Performed by: INTERNAL MEDICINE

## 2019-02-13 PROCEDURE — 12000000 ZZH R&B MED SURG/OB

## 2019-02-13 PROCEDURE — 80048 BASIC METABOLIC PNL TOTAL CA: CPT | Performed by: INTERNAL MEDICINE

## 2019-02-13 PROCEDURE — 99233 SBSQ HOSP IP/OBS HIGH 50: CPT | Performed by: INTERNAL MEDICINE

## 2019-02-13 PROCEDURE — 00000146 ZZHCL STATISTIC GLUCOSE BY METER IP

## 2019-02-13 PROCEDURE — 25000128 H RX IP 250 OP 636: Performed by: SURGERY

## 2019-02-13 PROCEDURE — 85025 COMPLETE CBC W/AUTO DIFF WBC: CPT | Performed by: INTERNAL MEDICINE

## 2019-02-13 PROCEDURE — 25000132 ZZH RX MED GY IP 250 OP 250 PS 637: Performed by: SURGERY

## 2019-02-13 RX ORDER — BISACODYL 10 MG
10 SUPPOSITORY, RECTAL RECTAL DAILY PRN
Status: DISCONTINUED | OUTPATIENT
Start: 2019-02-13 | End: 2019-02-27 | Stop reason: HOSPADM

## 2019-02-13 RX ORDER — POLYETHYLENE GLYCOL 3350 17 G/17G
17 POWDER, FOR SOLUTION ORAL 2 TIMES DAILY PRN
Status: DISCONTINUED | OUTPATIENT
Start: 2019-02-13 | End: 2019-02-27 | Stop reason: HOSPADM

## 2019-02-13 RX ORDER — MAGNESIUM CARB/ALUMINUM HYDROX 105-160MG
148 TABLET,CHEWABLE ORAL
Status: COMPLETED | OUTPATIENT
Start: 2019-02-13 | End: 2019-02-13

## 2019-02-13 RX ORDER — POLYETHYLENE GLYCOL 3350 17 G/17G
17 POWDER, FOR SOLUTION ORAL 2 TIMES DAILY
Status: DISCONTINUED | OUTPATIENT
Start: 2019-02-13 | End: 2019-02-27 | Stop reason: HOSPADM

## 2019-02-13 RX ADMIN — DOCUSATE SODIUM 100 MG: 100 CAPSULE, LIQUID FILLED ORAL at 08:03

## 2019-02-13 RX ADMIN — LOSARTAN POTASSIUM 50 MG: 50 TABLET, FILM COATED ORAL at 08:02

## 2019-02-13 RX ADMIN — CEFTRIAXONE SODIUM 2 G: 2 INJECTION, POWDER, FOR SOLUTION INTRAMUSCULAR; INTRAVENOUS at 12:20

## 2019-02-13 RX ADMIN — POLYETHYLENE GLYCOL 3350 17 G: 17 POWDER, FOR SOLUTION ORAL at 08:38

## 2019-02-13 RX ADMIN — GABAPENTIN 600 MG: 600 TABLET, FILM COATED ORAL at 21:43

## 2019-02-13 RX ADMIN — OXYCODONE HYDROCHLORIDE 15 MG: 5 TABLET ORAL at 05:27

## 2019-02-13 RX ADMIN — OXYCODONE HYDROCHLORIDE 15 MG: 5 TABLET ORAL at 00:13

## 2019-02-13 RX ADMIN — Medication 2 G: at 10:48

## 2019-02-13 RX ADMIN — METOPROLOL SUCCINATE 50 MG: 50 TABLET, EXTENDED RELEASE ORAL at 08:04

## 2019-02-13 RX ADMIN — AMIODARONE HYDROCHLORIDE 200 MG: 200 TABLET ORAL at 08:03

## 2019-02-13 RX ADMIN — DOCUSATE SODIUM 100 MG: 100 CAPSULE, LIQUID FILLED ORAL at 16:51

## 2019-02-13 RX ADMIN — SALINE NASAL SPRAY 2 SPRAY: 1.5 SOLUTION NASAL at 22:22

## 2019-02-13 RX ADMIN — DULOXETINE HYDROCHLORIDE 60 MG: 30 CAPSULE, DELAYED RELEASE ORAL at 21:44

## 2019-02-13 RX ADMIN — FUROSEMIDE 40 MG: 10 INJECTION, SOLUTION INTRAMUSCULAR; INTRAVENOUS at 08:05

## 2019-02-13 RX ADMIN — MAGNESIUM CITRATE 148 ML: 1.75 LIQUID ORAL at 10:09

## 2019-02-13 RX ADMIN — HYDROCHLOROTHIAZIDE 12.5 MG: 12.5 CAPSULE ORAL at 08:04

## 2019-02-13 RX ADMIN — GABAPENTIN 600 MG: 600 TABLET, FILM COATED ORAL at 16:22

## 2019-02-13 RX ADMIN — BACLOFEN 10 MG: 10 TABLET ORAL at 16:22

## 2019-02-13 RX ADMIN — SENNOSIDES AND DOCUSATE SODIUM 2 TABLET: 8.6; 5 TABLET ORAL at 21:43

## 2019-02-13 RX ADMIN — OXYCODONE HYDROCHLORIDE 15 MG: 5 TABLET ORAL at 11:44

## 2019-02-13 RX ADMIN — AMITRIPTYLINE HYDROCHLORIDE 100 MG: 50 TABLET, FILM COATED ORAL at 21:42

## 2019-02-13 RX ADMIN — POTASSIUM CHLORIDE 20 MEQ: 1.5 POWDER, FOR SOLUTION ORAL at 09:22

## 2019-02-13 RX ADMIN — OXYCODONE HYDROCHLORIDE 15 MG: 5 TABLET ORAL at 18:27

## 2019-02-13 RX ADMIN — ATORVASTATIN CALCIUM 10 MG: 10 TABLET, FILM COATED ORAL at 08:04

## 2019-02-13 RX ADMIN — BACLOFEN 10 MG: 10 TABLET ORAL at 21:43

## 2019-02-13 RX ADMIN — BACLOFEN 10 MG: 10 TABLET ORAL at 08:03

## 2019-02-13 RX ADMIN — BISACODYL 10 MG: 10 SUPPOSITORY RECTAL at 14:14

## 2019-02-13 RX ADMIN — HEPARIN SODIUM 5000 UNITS: 5000 INJECTION, SOLUTION INTRAVENOUS; SUBCUTANEOUS at 16:22

## 2019-02-13 RX ADMIN — GABAPENTIN 600 MG: 600 TABLET, FILM COATED ORAL at 08:04

## 2019-02-13 RX ADMIN — POLYETHYLENE GLYCOL 3350 17 G: 17 POWDER, FOR SOLUTION ORAL at 21:44

## 2019-02-13 RX ADMIN — DULOXETINE HYDROCHLORIDE 60 MG: 30 CAPSULE, DELAYED RELEASE ORAL at 08:01

## 2019-02-13 RX ADMIN — POLYETHYLENE GLYCOL 3350 17 G: 17 POWDER, FOR SOLUTION ORAL at 16:51

## 2019-02-13 RX ADMIN — ASPIRIN 81 MG 162 MG: 81 TABLET ORAL at 08:02

## 2019-02-13 RX ADMIN — HEPARIN SODIUM 5000 UNITS: 5000 INJECTION, SOLUTION INTRAVENOUS; SUBCUTANEOUS at 00:13

## 2019-02-13 RX ADMIN — HEPARIN SODIUM 5000 UNITS: 5000 INJECTION, SOLUTION INTRAVENOUS; SUBCUTANEOUS at 08:07

## 2019-02-13 ASSESSMENT — ACTIVITIES OF DAILY LIVING (ADL)
ADLS_ACUITY_SCORE: 27
ADLS_ACUITY_SCORE: 25
ADLS_ACUITY_SCORE: 27

## 2019-02-13 ASSESSMENT — MIFFLIN-ST. JEOR: SCORE: 1728.05

## 2019-02-13 NOTE — PLAN OF CARE
A&OX4. Vitals stable. Oxygen 96% on 3L per nc. Assist of 2 with transferring on mechanical lift. Lasix administered with good output. Wound care to groin and coccyx and left lower shin done.  Did not have bowel movement. Gave scheduled senna and PRN Colase for constipation.

## 2019-02-13 NOTE — PLAN OF CARE
Discharge Planner PT     Patient plan for discharge: TCU  Current status: PT - Pt up in chair upon arrival. Pt reports she did much better with sit to stand from bed vs chair.  Pt transferred from chair to bed via lift.  Pt transfers sit to stand x 3 with mod assist of 2 and 1 assist for stabilizing walker. Both feet were blocked prior and during sit to stand. Note pt to have increase forward head today.  Pt reports being more tired today. Pt was able to maintain standing for ~ 4 sec with mod assist of 2 with UE support onto ww. Pt reports needing to use the commode.  Pt was transferred from bed to commode via lift and support staff was notified.  Barriers to return to prior living situation: Pt alone during day, current level of assist  Recommendations for discharge: TCU, will require at least WC transport per plan established by the PT.    Rationale for recommendations: Pt significantly below baseline with all mobility.  Continued skilled PT (and OT) to increase overall strength/ROM, increase independence with bed mobility and transfers and increase activity tolerance to enable pt to return to her home at prior level of function or with least level of assist.       Entered by: Velvet Lozada 02/13/2019 11:39 AM

## 2019-02-13 NOTE — PLAN OF CARE
Discharge Planner OT   Patient plan for discharge: Chart indicates TCU, pt in agreement  Current status: Order received, chart reviewed. Per chart review and discussion with PT, pt currently requiring increased Ax2/lift assist for mobility as well as increased assist for all ADLs at this time. Pt in agreement for planned discharge to TCU. Will defer OT eval to TCU and complete current IP OT order. Pt will benefit from OT services at next level of care. PT addressing mobility deficits.   Barriers to return to prior living situation: Current level of assist required, home alone during the day  Recommendations for discharge: TCU - defer OT eval to TCU  Rationale for recommendations: Pt is significantly below baseline with regards to mobility and is unsafe to return home currently. Pt will require TCU at discharge - most appropriate to defer OT eval to TCU to address ADLs when pt able to mobilize with decreased assist.        Entered by: Karen San 02/13/2019 1:48 PM

## 2019-02-13 NOTE — PLAN OF CARE
Pt refuses turning intermittently throughout night. BS [id number]. Tele SR 1st degree AV. Generalized pain. Scheduled oxy administered. Will continue to monitor.

## 2019-02-13 NOTE — PROGRESS NOTES
**Sw is aware of new sw consult.  Please see initial sw consult note/assessment completed on 1/31 for complete details.    D:  Per record review, pt's discharge recommendation is TCU.    I:  Pt and family have identified [facility] as a TCU they would like a referral sent to.    A/P:  Sw will continue with discharge planning and will be available as needed until discharge.    Sw will send the TCU referral when there is a more definitive discharge date.

## 2019-02-13 NOTE — PROGRESS NOTES
[facility]  Hospitalist Progress Note  Name: Amanda Richardson    MRN: [id number]  Provider: Robert Little MD  Date of Service: 02/13/2019      Assessment & Plan   Summary of Stay: Amanda Richardson is a 53 year old female with past medical history significant for advanced MS, diabetes mellitus, hypertension, history of T-cell lymphoma status post treatment, chronically elevated WBC count, history of recurrent UTIs who presented with altered mental status increased weakness and somnolence.  Evaluation in the emergency room showed significant leukocytosis, hypotension, acute renal failure, obstructing left kidney stone with moderate hydronephrosis and respiratory failure with acidosis with hypercapnia.  She was admitted was admitted on 1/30/2019 with septic shock.  Patient received IV antibiotics and fluid resuscitation in the emergency room and underwent emergent cystoscopy and left retrograde pyelogram with stent placement on 1/30/2019.  CT also showed closed fracture of right femur.  She was initially placed on BiPAP for respiratory failure but perioperatively was intubated. she was admitted to ICU for further evaluation.    1/31/2019 patient developed wide-complex tachycardia appeared to be SVT failed to respond to vasovagal maneuvers.  Adenosine was given which showed rhythm to be atrial flutter she received cardioversion and was started on amiodarone.  Angiotensin II drip was initiated briefly for hypotension.  She is now off pressors as well as sedative meds.     Continues to improve. Pt extubated successfully on 2/8. Now able to eat per SLP. Pt interested in PT and OT.     She was transferred to the medical floor on 2/10/19.  At this point she remains admitted for ongoing diuresis for volume overload and I anticipate she will need a TCU stay.     She completed antibiotics on 2/13.       1.  Septic shock likely due to urinary tract source (altered mental status, acute renal failure,  hypotension, leukocytosis, acidosis, respiratory failure).  She received aggressive fluid resuscitation along with initiation of pressors prior to being taken to the operating room.  She now is stable and shock is fully resolved.  --CT scan on admission showed obstructing stone with hydronephrosis and acute renal failure  --Status post cystoscopy and stent placement 1/30/2019.  Per report patient did have purulent drainage noted during cystoscopy.  --IV antibiotics: started zosyn to cover for E. Coli isolate, now on ceftriaxone.  Started on 1/30.  We will plan on 14 days total.  End date was 2/13.  --Blood and urine cultures positive for E coli  --extubated as of 2/8/19    2.  Hypercapnic respiratory failure present on admission.  Still with some hypoxia likely related to volume overload from resolved sepsis and associated necessary management.  --Likely multifactorial from sepsis and chronic narcotic use  --Was intubated perioperatively and remained intubated following the procedure until 2/8.  --Continue IV diuretics again today though at a reduced dose of 40 mg IV once instead of twice, recheck bmp tomorrow as well as continue to monitor ins/outs and daily weights.    3.  Acute renal failure with left hydronephrosis: Resolved and now at baseline  --Secondary to obstructive uropathy.  Creatinine on admission 2.25, now with creatinine 0.6 -0.7  --Status post a stent placement; anticipate outpatient follow-up for definitive treatment of the stone and removal of the stent.    4.  New Atrial flutter provoked by urosepsis.  Stable without recurrence.  --Status post DC cardioversion 1/31/2019  --Appreciate cardiology consult  --Cardiac echo when compared to prior study showed minimal deterioration of left ventricular systolic function.  EF 50-55%  --No anticoagulation for now per cardiology patient was in atrial flutter for less than 8 hours  --Switched to oral amiodarone for a total of 2 weeks (now on 200 mg daily).   "Probably can stop at the time of discharge.    Right femoral neck fracture  --Incidental finding on CT.  Likely present for quite some time.  --Patient has MS and at baseline is able to pivot herself  --at this time, pt notes that the right hip is painful with vigorous movement.  --Orthopedic surgery was consulted.  No immediate intervention recommended at this time, defer to their expertise re: ongoing management/weight bearing etc.    Type 2 diabetes mellitus, \"borderline\"  --At baseline on metformin will hold for now  --Sliding scale insulin    MS with associated chronic pain  --Prior to admission on amitriptyline, baclofen, duloxetine, gabapentin and oxycodone  --PT evaluation    Significant leukocytosis: Improving  --Patient has history of T-cell non-Hodgkin's lymphoma status post treatment with baseline elevated white cell count (mid-teens)    H/o Hypertension  --resumed losartan and hydrochlorothiazide (not quite at pre-hospitalization doses)    Bilateral inguinal wounds  --managed with topical wound cares.  --should likely have home help with management of these upon discharge    Persistent leukocytosis    Constipation: In the setting of scheduled narcotic use.  She is been on twice daily senna and MiraLAX.  Adding as needed suppository, as needed enema and as needed half dose mag citrate as it has been approximately for 5 days since she has had a bowel movement.    DVT Prophylaxis: Pneumatic Compression Devices  Code Status: Full Code  Disposition: likely TCU with plan to ultimately return home.  ?2 more days for diuresis.        Interval History     Feels well today, up in chair  Completing antibiotics  Weaned from 5 L to 3 L supplemental O2  Brisk diuresis noted yesterday.  Weight seems somewhat inaccurate as she is noted to gain 2 pounds but care compared to 2 days ago is down almost 16 pounds.   Reducing diuretics today to just once daily  Has not had a bowel movement in at least 4 days, increasing " regimen as above and added as needed's.    -Data reviewed today: I reviewed all new labs and imaging reports over the last 24 hours. I personally reviewed no images or EKG's today.    Physical Exam   Temp: 98.1  F (36.7  C) Temp src: Oral BP: 130/52 Pulse: 76 Heart Rate: 76 Resp: 18 SpO2: 92 % O2 Device: Nasal cannula with humidification Oxygen Delivery: 3 LPM  Vitals:    02/11/19 0917 02/12/19 0614 02/13/19 0527   Weight: 122.2 kg (269 lb 6.4 oz) 114.1 kg (251 lb 9.6 oz) 114.8 kg (253 lb 1.6 oz)     Vital Signs with Ranges  Temp:  [97.6  F (36.4  C)-98.1  F (36.7  C)] 98.1  F (36.7  C)  Pulse:  [76] 76  Heart Rate:  [72-76] 76  Resp:  [16-18] 18  BP: (102-130)/(44-56) 130/52  SpO2:  [92 %-96 %] 92 %  I/O last 3 completed shifts:  In: 760 [P.O.:760]  Out: 4620 [Urine:4620]      GEN: alert, interactive.  Pleasant.  Nontoxic.  HEENT:  Normocephalic/atraumatic, no scleral icterus, no nasal discharge, mouth dry  CV: Tachycardic no murmur.    LUNGSleft basilar crackles.  Symmetric chest rise on inhalation noted.  ABD:  Active bowel sounds, soft, non-tender/non-distended.  No rebound/guarding/rigidity.   EXT: 3+ + lower extremity edema.  No cyanosis.    SKIN:  Dry to touch, chronic stasis dermatitis    Medications     sodium chloride Stopped (02/10/19 0900)     sodium chloride Stopped (02/09/19 1400)       amiodarone  200 mg Oral or Feeding Tube Daily     amitriptyline  100 mg Oral or Feeding Tube At Bedtime     aspirin  162 mg Oral or Feeding Tube Daily     atorvastatin  10 mg Oral or Feeding Tube Daily     baclofen  10 mg Oral or Feeding Tube TID     cefTRIAXone  2 g Intravenous Q24H     DULoxetine  60 mg Oral BID     furosemide  40 mg Intravenous BID     gabapentin  600 mg Oral TID     heparin  5,000 Units Subcutaneous Q8H     hydrochlorothiazide  12.5 mg Oral or Feeding Tube Daily     insulin aspart  1-7 Units Subcutaneous TID AC     insulin aspart  1-5 Units Subcutaneous At Bedtime     losartan  50 mg Oral or Feeding  Tube Daily     metoprolol succinate ER  50 mg Oral Daily     oxyCODONE  15 mg Oral Q6H     senna-docusate  2 tablet Oral At Bedtime     sodium chloride (PF)  10 mL Intracatheter Q7 Days     Data     No results for input(s): PH, PHV, PO2, PO2V, SAT, PCO2, PCO2V, HCO3, HCO3V in the last 168 hours.  Recent Labs   Lab 02/11/19 0625 02/09/19  0540 02/08/19  0605   WBC 20.2* 24.5* 21.2*   HGB 10.0* 9.5* 9.5*   HCT 32.9* 32.0* 32.2*   MCV 85 87 87   * 473* 451*     Recent Labs   Lab 02/12/19  0555 02/11/19  0625 02/10/19  0615 02/09/19  0540    138  --  143   POTASSIUM 3.8 3.7 3.7 3.8   CHLORIDE 102 100  --  107   CO2 31 30  --  31   ANIONGAP 6 8  --  5   GLC 96 108*  --  123*   BUN 20 20  --  20   CR 0.72 0.73  --  0.60   GFRESTIMATED >90 >90  --  >90   GFRESTBLACK >90 >90  --  >90   MARY 8.0* 8.3*  --  7.9*     No results for input(s): CULT in the last 168 hours.  Recent Labs   Lab 02/11/19 0625 02/09/19  0540 02/08/19  0605   HGB 10.0* 9.5* 9.5*

## 2019-02-14 ENCOUNTER — APPOINTMENT (OUTPATIENT)
Dept: PHYSICAL THERAPY | Facility: CLINIC | Age: 54
DRG: 853 | End: 2019-02-14
Payer: COMMERCIAL

## 2019-02-14 ENCOUNTER — APPOINTMENT (OUTPATIENT)
Dept: GENERAL RADIOLOGY | Facility: CLINIC | Age: 54
DRG: 853 | End: 2019-02-14
Attending: HOSPITALIST
Payer: COMMERCIAL

## 2019-02-14 LAB
ANION GAP SERPL CALCULATED.3IONS-SCNC: 5 MMOL/L (ref 3–14)
BASOPHILS # BLD AUTO: 0.1 10E9/L (ref 0–0.2)
BASOPHILS NFR BLD AUTO: 0.6 %
BUN SERPL-MCNC: 21 MG/DL (ref 7–30)
CALCIUM SERPL-MCNC: 8.4 MG/DL (ref 8.5–10.1)
CHLORIDE SERPL-SCNC: 97 MMOL/L (ref 94–109)
CO2 SERPL-SCNC: 36 MMOL/L (ref 20–32)
CREAT SERPL-MCNC: 0.76 MG/DL (ref 0.52–1.04)
DIFFERENTIAL METHOD BLD: ABNORMAL
EOSINOPHIL # BLD AUTO: 0.3 10E9/L (ref 0–0.7)
EOSINOPHIL NFR BLD AUTO: 2.2 %
ERYTHROCYTE [DISTWIDTH] IN BLOOD BY AUTOMATED COUNT: 18.6 % (ref 10–15)
GFR SERPL CREATININE-BSD FRML MDRD: 87 ML/MIN/{1.73_M2}
GLUCOSE BLDC GLUCOMTR-MCNC: 113 MG/DL (ref 70–99)
GLUCOSE BLDC GLUCOMTR-MCNC: 136 MG/DL (ref 70–99)
GLUCOSE BLDC GLUCOMTR-MCNC: 90 MG/DL (ref 70–99)
GLUCOSE BLDC GLUCOMTR-MCNC: 92 MG/DL (ref 70–99)
GLUCOSE BLDC GLUCOMTR-MCNC: 97 MG/DL (ref 70–99)
GLUCOSE SERPL-MCNC: 106 MG/DL (ref 70–99)
HCT VFR BLD AUTO: 33.7 % (ref 35–47)
HGB BLD-MCNC: 10 G/DL (ref 11.7–15.7)
IMM GRANULOCYTES # BLD: 0.2 10E9/L (ref 0–0.4)
IMM GRANULOCYTES NFR BLD: 1.4 %
LYMPHOCYTES # BLD AUTO: 1.2 10E9/L (ref 0.8–5.3)
LYMPHOCYTES NFR BLD AUTO: 10.8 %
MAGNESIUM SERPL-MCNC: 2.4 MG/DL (ref 1.6–2.3)
MCH RBC QN AUTO: 26 PG (ref 26.5–33)
MCHC RBC AUTO-ENTMCNC: 29.7 G/DL (ref 31.5–36.5)
MCV RBC AUTO: 88 FL (ref 78–100)
MONOCYTES # BLD AUTO: 0.8 10E9/L (ref 0–1.3)
MONOCYTES NFR BLD AUTO: 6.8 %
NEUTROPHILS # BLD AUTO: 8.9 10E9/L (ref 1.6–8.3)
NEUTROPHILS NFR BLD AUTO: 78.2 %
NRBC # BLD AUTO: 0 10*3/UL
NRBC BLD AUTO-RTO: 0 /100
PLATELET # BLD AUTO: 541 10E9/L (ref 150–450)
POTASSIUM SERPL-SCNC: 4.1 MMOL/L (ref 3.4–5.3)
RBC # BLD AUTO: 3.84 10E12/L (ref 3.8–5.2)
SODIUM SERPL-SCNC: 138 MMOL/L (ref 133–144)
WBC # BLD AUTO: 11.3 10E9/L (ref 4–11)

## 2019-02-14 PROCEDURE — 97110 THERAPEUTIC EXERCISES: CPT | Mod: GP | Performed by: PHYSICAL THERAPY ASSISTANT

## 2019-02-14 PROCEDURE — 74018 RADEX ABDOMEN 1 VIEW: CPT

## 2019-02-14 PROCEDURE — 36415 COLL VENOUS BLD VENIPUNCTURE: CPT | Performed by: HOSPITALIST

## 2019-02-14 PROCEDURE — 00000146 ZZHCL STATISTIC GLUCOSE BY METER IP

## 2019-02-14 PROCEDURE — 85025 COMPLETE CBC W/AUTO DIFF WBC: CPT | Performed by: HOSPITALIST

## 2019-02-14 PROCEDURE — 40000257 ZZH STATISTIC CONSULT NO CHARGE VASC ACCESS

## 2019-02-14 PROCEDURE — 99233 SBSQ HOSP IP/OBS HIGH 50: CPT | Performed by: HOSPITALIST

## 2019-02-14 PROCEDURE — 25000128 H RX IP 250 OP 636: Performed by: SURGERY

## 2019-02-14 PROCEDURE — 25000132 ZZH RX MED GY IP 250 OP 250 PS 637: Performed by: INTERNAL MEDICINE

## 2019-02-14 PROCEDURE — 25000128 H RX IP 250 OP 636: Performed by: HOSPITALIST

## 2019-02-14 PROCEDURE — 80048 BASIC METABOLIC PNL TOTAL CA: CPT | Performed by: HOSPITALIST

## 2019-02-14 PROCEDURE — 83735 ASSAY OF MAGNESIUM: CPT | Performed by: HOSPITALIST

## 2019-02-14 PROCEDURE — 12000000 ZZH R&B MED SURG/OB

## 2019-02-14 PROCEDURE — 25000132 ZZH RX MED GY IP 250 OP 250 PS 637: Performed by: HOSPITALIST

## 2019-02-14 RX ORDER — BISACODYL 10 MG
10 SUPPOSITORY, RECTAL RECTAL DAILY
Status: DISCONTINUED | OUTPATIENT
Start: 2019-02-14 | End: 2019-02-24 | Stop reason: CLARIF

## 2019-02-14 RX ORDER — FUROSEMIDE 10 MG/ML
40 INJECTION INTRAMUSCULAR; INTRAVENOUS EVERY 12 HOURS
Status: COMPLETED | OUTPATIENT
Start: 2019-02-14 | End: 2019-02-14

## 2019-02-14 RX ORDER — AMOXICILLIN 250 MG
2 CAPSULE ORAL 2 TIMES DAILY
Status: DISCONTINUED | OUTPATIENT
Start: 2019-02-14 | End: 2019-02-14

## 2019-02-14 RX ORDER — SENNOSIDES 8.6 MG
4 TABLET ORAL ONCE
Status: COMPLETED | OUTPATIENT
Start: 2019-02-14 | End: 2019-02-14

## 2019-02-14 RX ORDER — AMOXICILLIN 250 MG
3 CAPSULE ORAL 2 TIMES DAILY
Status: DISCONTINUED | OUTPATIENT
Start: 2019-02-14 | End: 2019-02-27 | Stop reason: HOSPADM

## 2019-02-14 RX ADMIN — AMIODARONE HYDROCHLORIDE 200 MG: 200 TABLET ORAL at 08:32

## 2019-02-14 RX ADMIN — SENNOSIDES AND DOCUSATE SODIUM 3 TABLET: 8.6; 5 TABLET ORAL at 21:05

## 2019-02-14 RX ADMIN — POLYETHYLENE GLYCOL 3350 17 G: 17 POWDER, FOR SOLUTION ORAL at 08:32

## 2019-02-14 RX ADMIN — FUROSEMIDE 40 MG: 10 INJECTION, SOLUTION INTRAMUSCULAR; INTRAVENOUS at 09:26

## 2019-02-14 RX ADMIN — MAGNESIUM CITRATE 286 ML: 1.75 LIQUID ORAL at 11:13

## 2019-02-14 RX ADMIN — OXYCODONE HYDROCHLORIDE 15 MG: 5 TABLET ORAL at 00:16

## 2019-02-14 RX ADMIN — METOPROLOL SUCCINATE 50 MG: 50 TABLET, EXTENDED RELEASE ORAL at 08:31

## 2019-02-14 RX ADMIN — OXYCODONE HYDROCHLORIDE 15 MG: 5 TABLET ORAL at 11:47

## 2019-02-14 RX ADMIN — OXYCODONE HYDROCHLORIDE 15 MG: 5 TABLET ORAL at 05:48

## 2019-02-14 RX ADMIN — GABAPENTIN 600 MG: 600 TABLET, FILM COATED ORAL at 21:05

## 2019-02-14 RX ADMIN — POLYETHYLENE GLYCOL 3350 17 G: 17 POWDER, FOR SOLUTION ORAL at 15:59

## 2019-02-14 RX ADMIN — HYDROCHLOROTHIAZIDE 12.5 MG: 12.5 CAPSULE ORAL at 08:29

## 2019-02-14 RX ADMIN — ATORVASTATIN CALCIUM 10 MG: 10 TABLET, FILM COATED ORAL at 08:28

## 2019-02-14 RX ADMIN — AMITRIPTYLINE HYDROCHLORIDE 100 MG: 50 TABLET, FILM COATED ORAL at 21:05

## 2019-02-14 RX ADMIN — LOSARTAN POTASSIUM 50 MG: 50 TABLET, FILM COATED ORAL at 08:28

## 2019-02-14 RX ADMIN — BACLOFEN 10 MG: 10 TABLET ORAL at 16:02

## 2019-02-14 RX ADMIN — DULOXETINE HYDROCHLORIDE 60 MG: 30 CAPSULE, DELAYED RELEASE ORAL at 08:27

## 2019-02-14 RX ADMIN — BACLOFEN 10 MG: 10 TABLET ORAL at 08:30

## 2019-02-14 RX ADMIN — FUROSEMIDE 40 MG: 10 INJECTION, SOLUTION INTRAMUSCULAR; INTRAVENOUS at 21:28

## 2019-02-14 RX ADMIN — GABAPENTIN 600 MG: 600 TABLET, FILM COATED ORAL at 16:02

## 2019-02-14 RX ADMIN — DULOXETINE HYDROCHLORIDE 60 MG: 30 CAPSULE, DELAYED RELEASE ORAL at 21:05

## 2019-02-14 RX ADMIN — HEPARIN SODIUM 5000 UNITS: 5000 INJECTION, SOLUTION INTRAVENOUS; SUBCUTANEOUS at 08:32

## 2019-02-14 RX ADMIN — SENNOSIDES AND DOCUSATE SODIUM 2 TABLET: 8.6; 5 TABLET ORAL at 09:26

## 2019-02-14 RX ADMIN — HEPARIN SODIUM 5000 UNITS: 5000 INJECTION, SOLUTION INTRAVENOUS; SUBCUTANEOUS at 00:17

## 2019-02-14 RX ADMIN — GABAPENTIN 600 MG: 600 TABLET, FILM COATED ORAL at 08:28

## 2019-02-14 RX ADMIN — POLYETHYLENE GLYCOL 3350 17 G: 17 POWDER, FOR SOLUTION ORAL at 21:06

## 2019-02-14 RX ADMIN — BACLOFEN 10 MG: 10 TABLET ORAL at 21:05

## 2019-02-14 RX ADMIN — ASPIRIN 81 MG 162 MG: 81 TABLET ORAL at 08:29

## 2019-02-14 RX ADMIN — BISACODYL 10 MG: 10 SUPPOSITORY RECTAL at 21:05

## 2019-02-14 RX ADMIN — HEPARIN SODIUM 5000 UNITS: 5000 INJECTION, SOLUTION INTRAVENOUS; SUBCUTANEOUS at 16:02

## 2019-02-14 RX ADMIN — SENNOSIDES 4 TABLET: 8.6 TABLET, FILM COATED ORAL at 18:13

## 2019-02-14 ASSESSMENT — ACTIVITIES OF DAILY LIVING (ADL)
ADLS_ACUITY_SCORE: 27
ADLS_ACUITY_SCORE: 29
ADLS_ACUITY_SCORE: 29

## 2019-02-14 ASSESSMENT — MIFFLIN-ST. JEOR: SCORE: 1704.92

## 2019-02-14 NOTE — PROGRESS NOTES
[facility]    Hospitalist Progress Note  Name: Amanda Richardson    MRN: [id number]  Provider:  Isaak Henao DO MPH  Date of Service: 02/14/2019    Summary of Stay: Summary of Stay: Amanda Richardson is a 53 year old female with past medical history significant for advanced MS, diabetes mellitus, hypertension, history of T-cell lymphoma status post treatment, chronically elevated WBC count, history of recurrent UTIs who presented with altered mental status increased weakness and somnolence.  Evaluation in the emergency room showed significant leukocytosis, hypotension, acute renal failure, obstructing left kidney stone with moderate hydronephrosis and respiratory failure with acidosis with hypercapnia.  She was admitted was admitted on 1/30/2019 with septic shock.  Patient received IV antibiotics and fluid resuscitation in the emergency room and underwent emergent cystoscopy and left retrograde pyelogram with stent placement on 1/30/2019.  CT also showed closed fracture of right femur.  She was initially placed on BiPAP for respiratory failure but perioperatively was intubated. she was admitted to ICU for further evaluation.     1/31/2019 patient developed wide-complex tachycardia appeared to be SVT failed to respond to vasovagal maneuvers.  Adenosine was given which showed rhythm to be atrial flutter she received cardioversion and was started on amiodarone.  Angiotensin II drip was initiated briefly for hypotension.  She is now off pressors as well as sedative meds.      Continues to improve. Pt extubated successfully on 2/8. Now able to eat per SLP. Pt interested in PT and OT.   She was transferred to the medical floor on 2/10/19.  At this point she remains admitted for ongoing diuresis for volume overload and I anticipate she will need a TCU stay.      1.  Septic shock likely due to urinary tract source (altered mental status, acute renal failure, hypotension, leukocytosis, acidosis, respiratory  failure).  She received aggressive fluid resuscitation along with initiation of pressors prior to being taken to the operating room.  She now is stable and shock is fully resolved.  --CT scan on admission showed obstructing stone with hydronephrosis and acute renal failure  --Status post cystoscopy and stent placement 1/30/2019.  Per report patient did have purulent drainage noted during cystoscopy.  --IV antibiotics: started zosyn to cover for E. Coli isolate, now on ceftriaxone.  Started on 1/30.  We will plan on 14 days total.  End date was 2/13.  --Blood and urine cultures positive for E coli  --Extubated as of 2/8/19     2.  Hypercapnic respiratory failure present on admission.  Still with some hypoxia likely related to volume overload from resolved sepsis and associated necessary management with severe deconditioning and atelectasis.  --Likely multifactorial from sepsis and chronic narcotic use  --Was intubated perioperatively and remained intubated following the procedure until 2/8.  --Continue IV diuresisis     3.  Acute renal failure with left hydronephrosis: Resolved and now at baseline  --Secondary to obstructive uropathy.  Creatinine on admission 2.25, now with creatinine 0.6 -0.7  --Status post a stent placement; anticipate outpatient follow-up for definitive treatment of the stone and removal of the stent.     4.  New Atrial flutter provoked by urosepsis.  Stable without recurrence.  --Status post DC cardioversion 1/31/2019  --Appreciate cardiology consult  --Cardiac echo when compared to prior study showed minimal deterioration of left ventricular systolic function.  EF 50-55%  --No anticoagulation for now per cardiology patient was in atrial flutter for less than 8 hours  --Switched to oral amiodarone for a total of 2 weeks (now on 200 mg daily).  Probably can stop at the time of discharge.     Right femoral neck fracture  --Incidental finding on CT.  Likely present for quite some time.  --Patient  "has MS and at baseline is able to pivot herself  --at this time, pt notes that the right hip is painful with vigorous movement.  --Orthopedic surgery was consulted.  No immediate intervention recommended at this time, defer to their expertise re: ongoing management/weight bearing etc.     Type 2 diabetes mellitus, \"borderline\"  --At baseline on metformin will hold for now  --Sliding scale insulin     MS with associated chronic pain  --Prior to admission on amitriptyline, baclofen, duloxetine, gabapentin and oxycodone  --PT evaluation     Significant leukocytosis: Improving  --Patient has history of T-cell non-Hodgkin's lymphoma status post treatment with baseline elevated white cell count (mid-teens)     H/o Hypertension  --resumed losartan and hydrochlorothiazide (not quite at pre-hospitalization doses)     Bilateral inguinal wounds  --managed with topical wound cares.  --should likely have home help with management of these upon discharge     Constipation: In the setting of scheduled narcotic use.  Continue laxatives, give enema.     DVT Prophylaxis: Pneumatic Compression Devices.  Code Status: Full Code.  Disposition: Likely TCU tomorrow.       Interval History   Assumed care from previous hospitalist. The history was fully reviewed.  The patient reports doing well. No chest pain or shortness of breath. No nausea, vomiting, diarrhea. Only small BM. No fevers. No other specific complaints identified.     -Data reviewed today: I personally reviewed all new labs and imaging results over the last 24 hours.     Physical Exam   Temp: 98.5  F (36.9  C) Temp src: Oral BP: 114/49   Heart Rate: 74 Resp: 18 SpO2: 92 % O2 Device: Nasal cannula Oxygen Delivery: 4 LPM  Vitals:    02/12/19 0614 02/13/19 0527 02/14/19 0654   Weight: 114.1 kg (251 lb 9.6 oz) 114.8 kg (253 lb 1.6 oz) 112.5 kg (248 lb)     Vital Signs with Ranges  Temp:  [97.7  F (36.5  C)-98.5  F (36.9  C)] 98.5  F (36.9  C)  Heart Rate:  [74-77] 74  Resp:  " [18-20] 18  BP: (108-132)/(49-66) 114/49  SpO2:  [90 %-93 %] 92 %  I/O last 3 completed shifts:  In: 420 [P.O.:420]  Out: 3000 [Urine:3000]    GENERAL: No apparent distress. Awake, alert, and fully oriented.  HEENT: Normocephalic, atraumatic. Extraocular movements intact.  CARDIOVASCULAR: Regular rate and rhythm without murmurs or rubs. No S3.  PULMONARY: Clear bilaterally.  GASTROINTESTINAL: Soft, non-tender, non-distended. Bowel sounds normoactive.   EXTREMITIES: No cyanosis or clubbing. 2+ edema.  NEUROLOGICAL: CN 2-12 grossly intact, no focal neurological deficits.  DERMATOLOGICAL: No rash, ulcer, bruising, nor jaundice.     Medications     sodium chloride Stopped (02/10/19 0900)     sodium chloride Stopped (02/09/19 1400)       amiodarone  200 mg Oral or Feeding Tube Daily     amitriptyline  100 mg Oral or Feeding Tube At Bedtime     aspirin  162 mg Oral or Feeding Tube Daily     atorvastatin  10 mg Oral or Feeding Tube Daily     baclofen  10 mg Oral or Feeding Tube TID     DULoxetine  60 mg Oral BID     furosemide  40 mg Intravenous Q12H     gabapentin  600 mg Oral TID     heparin  5,000 Units Subcutaneous Q8H     hydrochlorothiazide  12.5 mg Oral or Feeding Tube Daily     insulin aspart  1-7 Units Subcutaneous TID AC     insulin aspart  1-5 Units Subcutaneous At Bedtime     losartan  50 mg Oral or Feeding Tube Daily     metoprolol succinate ER  50 mg Oral Daily     oxyCODONE  15 mg Oral Q6H     polyethylene glycol  17 g Oral BID     senna-docusate  2 tablet Oral BID     sodium chloride (PF)  10 mL Intracatheter Q7 Days     Data     Laboratory:  Recent Labs   Lab 02/14/19  0716 02/13/19  0818 02/11/19  0625   WBC 11.3* 13.6* 20.2*   HGB 10.0* 9.8* 10.0*   HCT 33.7* 32.4* 32.9*   MCV 88 86 85   * 607* 586*     Recent Labs   Lab 02/14/19  0716 02/13/19  0818 02/12/19  0555    138 139   POTASSIUM 4.1 3.8 3.8   CHLORIDE 97 98 102   CO2 36* 33* 31   ANIONGAP 5 7 6   * 135* 96   BUN 21 21 20   CR  0.76 0.78 0.72   GFRESTIMATED 87 85 >90   GFRESTBLACK >90 >90 >90   MARY 8.4* 8.1* 8.0*     No results for input(s): CULT in the last 168 hours.    Imaging:  No results found for this or any previous visit (from the past 24 hour(s)).      Isaak Henao DO MPH  Counts include 234 beds at the [facility] Hospitalist  [address] [place] [address]  Pager: [phone]  02/14/2019

## 2019-02-14 NOTE — PLAN OF CARE
Very small BM today. PRN colace and miralax both given BID. Mg citrate given and dulcolax supp given. Passing flatus. Patient requesting to hold off on Pink lady enema.Up in chair via lift. Bobo cath care don on day shift. VSS Tele SR with AVB. Lungs course with insp wheezes. 2+ lower ext edema.

## 2019-02-14 NOTE — PLAN OF CARE
Senna, colace, miralax and pink lady enema given today resulting in an xtra small BM. Pos bowel sounds x 4 quads. Passing flatus. Denies nausea. 100% of meals eaten. Up in chair via lift. Lungs decreased bilat. 3+ lower ext edema. Tele SR. Bobo with large output. See I and O.     Addendum  Bowel sounds at 1645 in lower quads decreased from this am.

## 2019-02-14 NOTE — PLAN OF CARE
Discharge Planner PT     Patient plan for discharge: TCU  Current status: PT - Pt performed AROM exs to R LE x 10 reps: AP, marching, LAQ hip add/abd and quad sets.  PROM to R LE x 10 reps: ankle dorsi/plantar flex and knee ext.   Barriers to return to prior living situation: Pt alone during day, current level of assist  Recommendations for discharge: TCU, will require at least WC transport per plan established by the PT.    Rationale for recommendations: Pt significantly below baseline with all mobility.  Continued skilled PT (and OT) to increase overall strength/ROM, increase independence with bed mobility and transfers and increase activity tolerance to enable pt to return to her home at prior level of function or with least level of assist.       Entered by: Velvet Lozada 02/14/2019 12:38 PM

## 2019-02-14 NOTE — PLAN OF CARE
All VSS, LS dim and satting well on 4L NC.  Scheduled oxy given for pain.  Bobo in place with good UOP.  BG 92 and 122 this shift.  PICC in place, with minimal blood return, unable to draw labs from PICC.  Repositioned Q2 throughout shift.  Scheduled bowel meds given without results.  Continue with POC.  Plan is to go to TCU at discharge.

## 2019-02-15 ENCOUNTER — APPOINTMENT (OUTPATIENT)
Dept: PHYSICAL THERAPY | Facility: CLINIC | Age: 54
DRG: 853 | End: 2019-02-15
Payer: COMMERCIAL

## 2019-02-15 LAB
BASOPHILS # BLD AUTO: 0.1 10E9/L (ref 0–0.2)
BASOPHILS NFR BLD AUTO: 0.8 %
DIFFERENTIAL METHOD BLD: ABNORMAL
EOSINOPHIL # BLD AUTO: 0.3 10E9/L (ref 0–0.7)
EOSINOPHIL NFR BLD AUTO: 2.6 %
ERYTHROCYTE [DISTWIDTH] IN BLOOD BY AUTOMATED COUNT: 18.6 % (ref 10–15)
GLUCOSE BLDC GLUCOMTR-MCNC: 100 MG/DL (ref 70–99)
GLUCOSE BLDC GLUCOMTR-MCNC: 118 MG/DL (ref 70–99)
GLUCOSE BLDC GLUCOMTR-MCNC: 126 MG/DL (ref 70–99)
GLUCOSE BLDC GLUCOMTR-MCNC: 136 MG/DL (ref 70–99)
HCT VFR BLD AUTO: 34 % (ref 35–47)
HGB BLD-MCNC: 10.3 G/DL (ref 11.7–15.7)
IMM GRANULOCYTES # BLD: 0.2 10E9/L (ref 0–0.4)
IMM GRANULOCYTES NFR BLD: 1.6 %
LYMPHOCYTES # BLD AUTO: 1.9 10E9/L (ref 0.8–5.3)
LYMPHOCYTES NFR BLD AUTO: 14.7 %
MCH RBC QN AUTO: 26.4 PG (ref 26.5–33)
MCHC RBC AUTO-ENTMCNC: 30.3 G/DL (ref 31.5–36.5)
MCV RBC AUTO: 87 FL (ref 78–100)
MONOCYTES # BLD AUTO: 0.9 10E9/L (ref 0–1.3)
MONOCYTES NFR BLD AUTO: 6.9 %
NEUTROPHILS # BLD AUTO: 9.4 10E9/L (ref 1.6–8.3)
NEUTROPHILS NFR BLD AUTO: 73.4 %
NRBC # BLD AUTO: 0 10*3/UL
NRBC BLD AUTO-RTO: 0 /100
PLATELET # BLD AUTO: 592 10E9/L (ref 150–450)
RBC # BLD AUTO: 3.9 10E12/L (ref 3.8–5.2)
WBC # BLD AUTO: 12.7 10E9/L (ref 4–11)

## 2019-02-15 PROCEDURE — 85025 COMPLETE CBC W/AUTO DIFF WBC: CPT | Performed by: HOSPITALIST

## 2019-02-15 PROCEDURE — 97110 THERAPEUTIC EXERCISES: CPT | Mod: GP | Performed by: PHYSICAL THERAPIST

## 2019-02-15 PROCEDURE — 25000132 ZZH RX MED GY IP 250 OP 250 PS 637: Performed by: HOSPITALIST

## 2019-02-15 PROCEDURE — 25000128 H RX IP 250 OP 636: Performed by: SURGERY

## 2019-02-15 PROCEDURE — 36415 COLL VENOUS BLD VENIPUNCTURE: CPT | Performed by: HOSPITALIST

## 2019-02-15 PROCEDURE — 12000000 ZZH R&B MED SURG/OB

## 2019-02-15 PROCEDURE — G0463 HOSPITAL OUTPT CLINIC VISIT: HCPCS

## 2019-02-15 PROCEDURE — 25000128 H RX IP 250 OP 636: Performed by: HOSPITALIST

## 2019-02-15 PROCEDURE — 25000132 ZZH RX MED GY IP 250 OP 250 PS 637: Performed by: INTERNAL MEDICINE

## 2019-02-15 PROCEDURE — 99239 HOSP IP/OBS DSCHRG MGMT >30: CPT | Performed by: HOSPITALIST

## 2019-02-15 PROCEDURE — 00000146 ZZHCL STATISTIC GLUCOSE BY METER IP

## 2019-02-15 RX ORDER — BACLOFEN 10 MG/1
10 TABLET ORAL 3 TIMES DAILY
Qty: 30 TABLET | Refills: 0 | Status: SHIPPED | OUTPATIENT
Start: 2019-02-15 | End: 2019-03-16

## 2019-02-15 RX ORDER — LOSARTAN POTASSIUM 50 MG/1
50 TABLET ORAL DAILY
Qty: 30 TABLET | Refills: 0 | DISCHARGE
Start: 2019-02-16 | End: 2019-02-27

## 2019-02-15 RX ORDER — AMOXICILLIN 250 MG
3 CAPSULE ORAL 2 TIMES DAILY
Qty: 180 TABLET | Refills: 0 | Status: ON HOLD | DISCHARGE
Start: 2019-02-15 | End: 2019-03-25

## 2019-02-15 RX ORDER — GABAPENTIN 600 MG/1
600 TABLET ORAL 3 TIMES DAILY
Qty: 30 TABLET | Refills: 0 | Status: SHIPPED | OUTPATIENT
Start: 2019-02-15 | End: 2019-03-28

## 2019-02-15 RX ORDER — BISACODYL 10 MG
10 SUPPOSITORY, RECTAL RECTAL DAILY PRN
Status: ON HOLD | DISCHARGE
Start: 2019-02-15 | End: 2019-03-25

## 2019-02-15 RX ORDER — DOCUSATE SODIUM 100 MG/1
100 CAPSULE, LIQUID FILLED ORAL 2 TIMES DAILY PRN
DISCHARGE
Start: 2019-02-15 | End: 2019-02-27

## 2019-02-15 RX ORDER — POLYETHYLENE GLYCOL 3350 17 G/17G
17 POWDER, FOR SOLUTION ORAL 2 TIMES DAILY PRN
DISCHARGE
Start: 2019-02-15 | End: 2019-03-13

## 2019-02-15 RX ORDER — HYDROCHLOROTHIAZIDE 12.5 MG/1
12.5 CAPSULE ORAL DAILY
Qty: 30 CAPSULE | Refills: 0 | DISCHARGE
Start: 2019-02-16 | End: 2019-03-28

## 2019-02-15 RX ORDER — OXYCODONE HYDROCHLORIDE 15 MG/1
7.5-15 TABLET ORAL EVERY 4 HOURS PRN
Qty: 30 TABLET | Refills: 0 | Status: SHIPPED | OUTPATIENT
Start: 2019-02-15 | End: 2019-03-07

## 2019-02-15 RX ORDER — FUROSEMIDE 10 MG/ML
40 INJECTION INTRAMUSCULAR; INTRAVENOUS EVERY 12 HOURS
Status: COMPLETED | OUTPATIENT
Start: 2019-02-15 | End: 2019-02-16

## 2019-02-15 RX ADMIN — ATORVASTATIN CALCIUM 10 MG: 10 TABLET, FILM COATED ORAL at 08:34

## 2019-02-15 RX ADMIN — POLYETHYLENE GLYCOL 3350 17 G: 17 POWDER, FOR SOLUTION ORAL at 08:35

## 2019-02-15 RX ADMIN — POLYETHYLENE GLYCOL 3350 17 G: 17 POWDER, FOR SOLUTION ORAL at 21:14

## 2019-02-15 RX ADMIN — OXYCODONE HYDROCHLORIDE 15 MG: 5 TABLET ORAL at 18:22

## 2019-02-15 RX ADMIN — HEPARIN SODIUM 5000 UNITS: 5000 INJECTION, SOLUTION INTRAVENOUS; SUBCUTANEOUS at 00:18

## 2019-02-15 RX ADMIN — SENNOSIDES AND DOCUSATE SODIUM 3 TABLET: 8.6; 5 TABLET ORAL at 08:35

## 2019-02-15 RX ADMIN — BACLOFEN 10 MG: 10 TABLET ORAL at 21:14

## 2019-02-15 RX ADMIN — METOPROLOL SUCCINATE 50 MG: 50 TABLET, EXTENDED RELEASE ORAL at 08:34

## 2019-02-15 RX ADMIN — OXYCODONE HYDROCHLORIDE 15 MG: 5 TABLET ORAL at 12:26

## 2019-02-15 RX ADMIN — FUROSEMIDE 40 MG: 10 INJECTION, SOLUTION INTRAMUSCULAR; INTRAVENOUS at 18:22

## 2019-02-15 RX ADMIN — HEPARIN SODIUM 5000 UNITS: 5000 INJECTION, SOLUTION INTRAVENOUS; SUBCUTANEOUS at 08:35

## 2019-02-15 RX ADMIN — GABAPENTIN 600 MG: 600 TABLET, FILM COATED ORAL at 21:14

## 2019-02-15 RX ADMIN — OXYCODONE HYDROCHLORIDE 15 MG: 5 TABLET ORAL at 00:18

## 2019-02-15 RX ADMIN — DULOXETINE HYDROCHLORIDE 60 MG: 30 CAPSULE, DELAYED RELEASE ORAL at 08:33

## 2019-02-15 RX ADMIN — GABAPENTIN 600 MG: 600 TABLET, FILM COATED ORAL at 08:34

## 2019-02-15 RX ADMIN — LOSARTAN POTASSIUM 50 MG: 50 TABLET, FILM COATED ORAL at 08:34

## 2019-02-15 RX ADMIN — HYDROCHLOROTHIAZIDE 12.5 MG: 12.5 CAPSULE ORAL at 08:35

## 2019-02-15 RX ADMIN — BACLOFEN 10 MG: 10 TABLET ORAL at 08:34

## 2019-02-15 RX ADMIN — GABAPENTIN 600 MG: 600 TABLET, FILM COATED ORAL at 15:29

## 2019-02-15 RX ADMIN — BISACODYL 10 MG: 10 SUPPOSITORY RECTAL at 11:15

## 2019-02-15 RX ADMIN — AMIODARONE HYDROCHLORIDE 200 MG: 200 TABLET ORAL at 08:35

## 2019-02-15 RX ADMIN — OXYCODONE HYDROCHLORIDE 15 MG: 5 TABLET ORAL at 06:15

## 2019-02-15 RX ADMIN — BACLOFEN 10 MG: 10 TABLET ORAL at 15:29

## 2019-02-15 RX ADMIN — AMITRIPTYLINE HYDROCHLORIDE 100 MG: 50 TABLET, FILM COATED ORAL at 21:15

## 2019-02-15 RX ADMIN — OXYCODONE HYDROCHLORIDE 15 MG: 5 TABLET ORAL at 23:50

## 2019-02-15 RX ADMIN — SENNOSIDES AND DOCUSATE SODIUM 3 TABLET: 8.6; 5 TABLET ORAL at 21:14

## 2019-02-15 RX ADMIN — HEPARIN SODIUM 5000 UNITS: 5000 INJECTION, SOLUTION INTRAVENOUS; SUBCUTANEOUS at 23:50

## 2019-02-15 RX ADMIN — DULOXETINE HYDROCHLORIDE 60 MG: 30 CAPSULE, DELAYED RELEASE ORAL at 21:15

## 2019-02-15 RX ADMIN — HEPARIN SODIUM 5000 UNITS: 5000 INJECTION, SOLUTION INTRAVENOUS; SUBCUTANEOUS at 15:29

## 2019-02-15 RX ADMIN — ASPIRIN 81 MG 162 MG: 81 TABLET ORAL at 08:34

## 2019-02-15 ASSESSMENT — ACTIVITIES OF DAILY LIVING (ADL)
ADLS_ACUITY_SCORE: 27

## 2019-02-15 NOTE — DISCHARGE SUMMARY
Hospitalist Discharge Summary  [facility]    Amanda Richardson MRN# [id number]   YOB: 1965 Age: 53 year old     Date of Admission:  1/30/2019  Date of Discharge:  2/15/2019  Admitting Physician:  Hermilo Yarbrough MD  Discharge Physician:  Isaak Henao  Discharging Service:  Hospitalist     Primary Provider: Julius Hassan          Discharge Diagnosis:   Septic shock likely due to urinary tract infection  Hypercapnic and hypoxic respiratory failure  Acute renal failure secondary to obstructive uropathy with left-sided hydronephrosis  New atrial flutter provoked by urosepsis  Borderline type 2 diabetes mellitus  Multiple sclerosis  Chronic pain syndrome on chronic narcotics  Hypertension  Bilateral inguinal wounds, chronic  Right femoral neck fracture  Constipation             Discharge Disposition:   Discharged to rehabilitation facility           Allergies:   Allergies   Allergen Reactions     Lisinopril      Lip swelling     Cyclobenzaprine Other (See Comments)     Per 4-10-17 H&P by Yanna Dugan PA-C.     Flexeril [Cyclobenzaprine Hcl]      Got confused               Discharge Medications:      Review of your medicines      START taking      Dose / Directions   bisacodyl 10 MG suppository  Commonly known as:  DULCOLAX  Used for:  MS (multiple sclerosis) (H)      Dose:  10 mg  Place 1 suppository (10 mg) rectally daily as needed for constipation  Refills:  0     docusate sodium 100 MG capsule  Commonly known as:  COLACE  Used for:  MS (multiple sclerosis) (H)      Dose:  100 mg  Take 1 capsule (100 mg) by mouth 2 times daily as needed for constipation  Refills:  0     furosemide 40 MG tablet  Commonly known as:  LASIX      Dose:  40 mg  Take 1 tablet (40 mg) by mouth daily  Quantity:  30 tablet  Refills:  0     hydrochlorothiazide 12.5 MG capsule  Commonly known as:  MICROZIDE  Used for:  Hypertension goal BP (blood pressure) < 140/90      Dose:  12.5 mg  1 capsule (12.5 mg) by Oral or  Feeding Tube route daily  Quantity:  30 capsule  Refills:  0     losartan 50 MG tablet  Commonly known as:  COZAAR  Used for:  Hypertension goal BP (blood pressure) < 140/90      Dose:  50 mg  1 tablet (50 mg) by Oral or Feeding Tube route daily  Quantity:  30 tablet  Refills:  0     polyethylene glycol packet  Commonly known as:  MIRALAX/GLYCOLAX  Used for:  MS (multiple sclerosis) (H)      Dose:  17 g  Take 17 g by mouth 2 times daily as needed (constipation)  Refills:  0     senna-docusate 8.6-50 MG tablet  Commonly known as:  SENOKOT-S/PERICOLACE  Used for:  MS (multiple sclerosis) (H)      Dose:  3 tablet  Take 3 tablets by mouth 2 times daily  Quantity:  180 tablet  Refills:  0        CONTINUE these medicines which may have CHANGED, or have new prescriptions. If we are uncertain of the size of tablets/capsules you have at home, strength may be listed as something that might have changed.      Dose / Directions   gabapentin 600 MG tablet  Commonly known as:  NEURONTIN  This may have changed:  See the new instructions.  Used for:  MS (multiple sclerosis) (H)      Dose:  600 mg  Take 1 tablet (600 mg) by mouth 3 times daily  Quantity:  30 tablet  Refills:  0        CONTINUE these medicines which have NOT CHANGED      Dose / Directions   amitriptyline 100 MG tablet  Commonly known as:  ELAVIL  Used for:  Lumbar radiculopathy, Chronic pain syndrome, MS (multiple sclerosis) (H), Moderate depressive episode (H)      Dose:  100 mg  Take 1 tablet (100 mg) by mouth At Bedtime  Quantity:  90 tablet  Refills:  3     ASPIRIN PO      Dose:  162 mg  Take 162 mg by mouth daily  Refills:  0     atorvastatin 10 MG tablet  Commonly known as:  LIPITOR  Used for:  Hyperlipidemia LDL goal <70      Dose:  10 mg  Take 1 tablet (10 mg) by mouth daily  Quantity:  90 tablet  Refills:  3     baclofen 10 MG tablet  Commonly known as:  LIORESAL  Used for:  Chronic pain of both lower extremities      Dose:  10 mg  Take 1 tablet (10 mg) by  mouth 3 times daily May take an additional 10 mg as needed qd for total of 40 mg per day  Quantity:  30 tablet  Refills:  0     cholecalciferol 1000 units Tabs      Dose:  1000 Units  Take 1,000 Units by mouth daily  Refills:  0     DULoxetine 60 MG capsule  Commonly known as:  CYMBALTA      Dose:  60 mg  Take 60 mg by mouth 2 times daily  Refills:  0     metFORMIN 500 MG tablet  Commonly known as:  GLUCOPHAGE  Used for:  Type 2 diabetes mellitus with stage 1 chronic kidney disease, without long-term current use of insulin (H)      Dose:  500 mg  Take 1 tablet (500 mg) by mouth daily (with dinner)  Quantity:  90 tablet  Refills:  3     metoprolol succinate ER 50 MG 24 hr tablet  Commonly known as:  TOPROL-XL  Used for:  Hypertension goal BP (blood pressure) < 140/90      TAKE 1 TABLET BY MOUTH ONCE DAILY  Quantity:  90 tablet  Refills:  1     MULTI-VITAMIN PO      Dose:  1 tablet  Take 1 tablet by mouth daily  Refills:  0     omega-3 fatty acids 1200 MG capsule      Dose:  1 capsule  Take 1 capsule by mouth daily.  Refills:  0     oxyCODONE IR 15 MG tablet  Commonly known as:  ROXICODONE  Used for:  MS (multiple sclerosis) (H), Chronic pain syndrome, Spinal stenosis of lumbar region, unspecified whether neurogenic claudication present, Lumbar radiculopathy      Dose:  7.5-15 mg  Take 0.5-1 tablets (7.5-15 mg) by mouth every 4 hours as needed for moderate to severe pain Limit 4 tablet(s) per day.  Quantity:  30 tablet  Refills:  0        STOP taking    losartan-hydrochlorothiazide 100-25 MG tablet  Commonly known as:  HYZAAR              Where to get your medicines      Some of these will need a paper prescription and others can be bought over the counter. Ask your nurse if you have questions.    Bring a paper prescription for each of these medications    baclofen 10 MG tablet    gabapentin 600 MG tablet    oxyCODONE IR 15 MG tablet                Condition on Discharge:   Discharge condition: Stable   Discharge  "vitals: Blood pressure 135/55, pulse 76, temperature 97.7  F (36.5  C), temperature source Oral, resp. rate 16, height 1.626 m (5' 4\"), weight 112.5 kg (248 lb), last menstrual period 12/11/2011, SpO2 94 %, not currently breastfeeding.   Code status on discharge: Full Code      BASIC PHYSICAL EXAMINATION:  GENERAL: No apparent distress.  CARDIOVASCULAR: Regular rate and rhythm without murmurs.  PULMONARY: Clear to auscultation bilaterally.   GASTROINTESTINAL: Abdomen soft, non-tender.  EXTREMITIES: No edema, pulses intact.  NEUROLOGIC: No focal deficits.            History of Illness:   See detailed admission note for full details.               Procedures excluding imaging which is summarized below:   Please see details in the electronic medical record.           Consultations:   UROLOGY IP CONSULT  ORTHOPEDIC SURGERY IP CONSULT  UROLOGY IP CONSULT  WOUND OSTOMY CONTINENCE NURSE  IP CONSULT  PHARMACY TO DOSE VANCO  PHARMACY TO DOSE VANCO  NUTRITION SERVICES ADULT IP CONSULT  PHARMACY IP CONSULT  CARE COORDINATOR IP CONSULT  CARDIOLOGY IP CONSULT  CARDIOLOGY IP CONSULT  NUTRITION SERVICES ADULT IP CONSULT  WOUND OSTOMY CONTINENCE NURSE  IP CONSULT  WOUND OSTOMY CONTINENCE NURSE  IP CONSULT  PHARMACY TO DOSE VANCO  VASCULAR ACCESS ADULT IP CONSULT  PHYSICAL THERAPY ADULT IP CONSULT  OCCUPATIONAL THERAPY ADULT IP CONSULT  SPEECH LANGUAGE PATH ADULT IP CONSULT  CARE TRANSITION RN/SW IP CONSULT  PHYSICAL THERAPY ADULT IP CONSULT  OCCUPATIONAL THERAPY ADULT IP CONSULT          Significant Results:   Results for orders placed or performed during the hospital encounter of 01/30/19   XR Chest 2 Views    Narrative    CHEST TWO VIEWS January 30, 2019 1:24 PM     HISTORY: Hypoxia.    COMPARISON: Chest x-ray 11/1/2017.      Impression    IMPRESSION: Two views of the chest are performed. Atelectasis is noted  at the lung bases. Lungs are otherwise clear. Heart is enlarged. This  may have increased slightly since prior exam. No " obvious pneumothorax  or pleural effusions.    MIKAELA SALGADO MD   Head CT w/o contrast    Narrative    CT SCAN OF THE HEAD WITHOUT CONTRAST   1/30/2019 4:16 PM     HISTORY: Altered level of consciousness (LOC), unexplained    TECHNIQUE:  Axial images of the head and coronal reformations without  IV contrast material. Radiation dose for this scan was reduced using  automated exposure control, adjustment of the mA and/or kV according  to patient size, or iterative reconstruction technique.    COMPARISON: None.    FINDINGS: Motion artifact degrades the quality of some of these  images. The ventricles are normal in size, shape and configuration.   The brain parenchyma and subarachnoid spaces are normal. There is no  evidence of intracranial hemorrhage, mass, acute infarct or anomaly.  The visualized portions of the sinuses and mastoids appear normal. No  evidence for trauma.      Impression    IMPRESSION: No acute pathology. No bleed, mass, or infarcts.      MURTAZA FREITAS MD   CT Abdomen Pelvis w/o Contrast    Narrative    CT ABDOMEN AND PELVIS WITHOUT CONTRAST  1/30/2019 4:17 PM     INDICATION: Abdominal pain, unspecified. Leukocytosis, lower abdominal  wall/groin infection.      TECHNIQUE: Thin axial images through the abdomen and pelvis without  contrast. Coronal reformatted images. Radiation dose for this scan was  reduced using automated exposure control, adjustment of the mA and/or  kV according to patient size, or iterative reconstruction technique.    COMPARISON: 9/21/2012.    FINDINGS: Mild left hydronephrosis due to two adjacent proximal  ureteral stones, the largest measuring 1 cm. Small nonobstructing  stones in the lower left kidney. Punctate nonobstructing stone lower  pole right kidney. Cholelithiasis. Mild nonspecific adrenal gland  thickening of doubtful significance. Upper abdominal organs are  otherwise negative without benefit of contrast. Artifact related to a  battery pack in the right anterior  abdominal wall.    Minimal opacity in the right middle lobe and at the right base, likely  atelectasis. Slightly more prominent opacity at the left lung base may  be atelectasis or pneumonia.    Uterus is present. Mildly displaced, impacted right femoral neck  fracture, age-indeterminate. This is new since the prior study and  could be recent. Postoperative changes lumbar spine and sacrum.      Impression    IMPRESSION:  1. Mild left hydronephrosis due to two adjacent proximal ureteral  stones measuring up to 1 cm.  2. Mildly displaced, impacted right femoral neck fracture, age  indeterminate, but could be recent. Correlate clinically.  3. Cholelithiasis. Bilateral renal stones.  4. Mild atelectasis or infiltrate left lung base.    BRENDEN PEREZ MD   XR Surgery LUCILA L/T 5 Min Fluoro w Stills    Narrative    This exam was marked as non-reportable because it will not be read by a   radiologist or a [place] non-radiologist provider.             Chest  XR, 1 view PORTABLE    Narrative    CHEST ONE VIEW PORTABLE  1/30/2019 7:40 PM     COMPARISON: Two-view chest x-ray same day    HISTORY: After tube placement      Impression    IMPRESSION: There has been interval placement of an endotracheal tube  with its tip at the level of the clavicular heads. There has been  interval placement of a right internal jugular central venous catheter  with its tip likely in the mid superior vena cava, although evaluation  is limited due to suboptimal image quality. Patchy airspace opacity in  the left lung base possibly representing atelectasis is again noted.  The right lung appears clear of airspace opacities, although there is  questionable interstitial prominence in the right lung that may  represent pulmonary edema. No pneumothorax. No definite pleural  effusion. Heart size remains moderately enlarged.    LUAN CARRILLO MD   XR Abdomen 1 View    Narrative    ABDOMEN ONE VIEW  1/30/2019 10:07 PM     COMPARISON: None    HISTORY:  Orogastric tube placement.      Impression    IMPRESSION: Tip of the orogastric tube is in the gastric fundus. A  left ureteral stent is noted. Posterior spine fusion hardware from L4  to the pelvis is noted. A probable pain infusion pump is implanted  over the right lower quadrant. Abdominal bowel gas pattern is  nonspecific. There is no evidence for free intraperitoneal air.    IMPRESSION: No evidence for bowel obstruction or free air.    LUAN CARRILLO MD   XR Chest Port 1 View    Narrative    CHEST PORTABLE ONE VIEW   1/31/2019 5:46 AM     HISTORY: Post OR.    COMPARISON: 1/30/2019      Impression    IMPRESSION: There is increased left lower lobe consolidation, likely  atelectasis. Lung volumes are otherwise unchanged. Tubes and lines are  unchanged. Heart size is unchanged.    PARMINDER GOMEZ MD   XR Abdomen Port 1 View    Narrative    XR ABDOMEN PORT 1 VW 2/1/2019 2:30 PM    HISTORY: Assess feeding tube placement.    COMPARISON: 1/30/2019    FINDINGS: Enteric tube tip is in the projection of the stomach. Left  nephroureteral stent noted as well as lumbar hardware and electronic  pump.      Impression    IMPRESSION: Feeding tube tip appears to be in the stomach.    RICKY PATRICK MD   XR Chest Port 1 View    Narrative    CHEST ONE VIEW PORTABLE  2/2/2019 10:09 AM     HISTORY: Increased 02 needs.    COMPARISON: 1/31/2019.        Impression    IMPRESSION:   1. Patient is rotated to the right which limits evaluation. ET tube,  enteric tube, and right IJ catheter remain in place.   2. Increase moderate layering bilateral pleural effusions with  bibasilar consolidation. Hazy opacities throughout the lungs.  Differential includes pulmonary edema and/or pneumonia. Cardiac  silhouette appears enlarged.    MICHAEL SKINNER MD   XR Chest Port 1 View    Narrative    CHEST ONE VIEW PORTABLE   2/4/2019 5:47 AM     HISTORY:  Follow up pleural effusions.    COMPARISON: 2/2/2019      Impression    IMPRESSION: Endotracheal tube in  good position. Feeding tube passes  into the abdomen, and nasogastric tube has been removed. Central  venous catheter from right internal jugular approach in the superior  vena cava. Decrease in bilateral pleural effusions. Mild persistent  bibasilar infiltrates, improved. Borderline cardiomegaly.    PARMINDER HEBERT MD   XR Chest Port 1 View    Narrative    CHEST ONE VIEW PORTABLE February 12, 2019 1:41 PM     HISTORY: Resolving sepsis, now with hypoxia. Rule out pulmonary edema.    COMPARISON: 2/4/2019.      Impression    IMPRESSION: Endotracheal tube and feeding tube have been removed. PICC  line is unchanged. Resolution of bibasilar pulmonary infiltrates.  Elevated right diaphragm, unchanged.    PARMINDER HEBERT MD   XR Abdomen Port 1 View    Narrative    ABDOMEN ONE VIEW PORTABLE  2/14/2019 5:42 PM     HISTORY: Constipation, rule out obstruction.    COMPARISON: None.       Impression    IMPRESSION: Left ureteral stent noted. Small amount of stool.  Nonobstructed bowel gas pattern.    DERECK MIRANDA MD       Transthoracic Echocardiogram Results:  No results found for this or any previous visit (from the past 4320 hour(s)).             Pending Results:   Unresulted Labs Ordered in the Past 30 Days of this Admission     No orders found from 12/1/2018 to 1/31/2019.                      Discharge Instructions and Follow-Up:   Discharge instructions and follow-up:   Discharge Procedure Orders   General info for SNF   Order Comments: Length of Stay Estimate: Short Term Care: Estimated # of Days <30  Condition at Discharge: Stable  Level of care:skilled   Rehabilitation Potential: Fair  Admission H&P remains valid and up-to-date: Yes  Recent Chemotherapy: N/A  Use Nursing Home Standing Orders: Yes     Mantoux instructions   Order Comments: Give two-step Mantoux (PPD) Per Facility Policy Yes     Follow Up and recommended labs and tests   Order Comments: Follow up with [facility] physician.  The following labs/tests are  recommended: BMP, CBC in 1 week.  Follow up with urology in 2 weeks to discuss stent and definitive stone management.     Activity - Up with nursing assistance     Order Specific Question Answer Comments   Is discharge order? Yes      Full Code     Order Specific Question Answer Comments   Code status determined by: Discussion with patient/legal decision maker      Physical Therapy Adult Consult   Order Comments: Evaluate and treat as clinically indicated.    Reason:  deconditioning     Occupational Therapy Adult Consult   Order Comments: Evaluate and treat as clinically indicated.    Reason:  deconditioning     Oxygen - Nasal cannula   Order Comments: 3 Lpm by nasal cannula to keep O2 sats 92% or greater.     Fall precautions     Advance Diet as Tolerated   Order Comments: Follow this diet upon discharge: Orders Placed This Encounter      Regular Diet Adult     Order Specific Question Answer Comments   Is discharge order? Yes              Hospital Course:   Amanad Richardson is a 53 year old female with past medical history significant for advanced MS, diabetes mellitus, hypertension, history of T-cell lymphoma status post treatment, chronically elevated WBC count, history of recurrent UTIs who presented with altered mental status increased weakness and somnolence.      Evaluation in the emergency room showed significant leukocytosis, hypotension, acute renal failure, obstructing left kidney stone with moderate hydronephrosis and respiratory failure with acidosis with hypercapnia.  She was admitted on 1/30/2019 with septic shock.  Patient received IV antibiotics and fluid resuscitation in the emergency room and underwent emergent cystoscopy and left retrograde pyelogram with stent placement on 1/30/2019.  CT also showed closed fracture of right femur.  She was initially placed on BiPAP for respiratory failure but perioperatively was intubated.  She was admitted to ICU for further evaluation.     1/31/2019 patient  developed wide-complex tachycardia appeared to be SVT failed to respond to vasovagal maneuvers.  Adenosine was given which showed rhythm to be atrial flutter she received cardioversion and was started on amiodarone.  Vasopressor drip was initiated briefly for hypotension.  She is now off pressors as well as sedative meds.      Pt extubated successfully on 2/8.  Now able to eat regular diet.  She was transferred to the medical floor on 2/10/19.  She remained hypoxic and received diuresis with dramatic improvement of her weight.  She remains on 3 L of oxygen by nasal cannula which I suspect is due to atelectasis and severe deconditioning.  She has completed a course of antibiotics.  She will need supplemental oxygen on discharge.  She will discharged to TCU today.  She will require follow-up in the urology clinic for definitive management of her stone and stent.     Septic shock likely due to urinary tract source (altered mental status, acute renal failure, hypotension, leukocytosis, acidosis, respiratory failure).  She received aggressive fluid resuscitation along with initiation of pressors prior to being taken to the operating room.  She now is stable and shock is fully resolved.  --CT scan on admission showed obstructing stone with hydronephrosis and acute renal failure.  --Status post cystoscopy and stent placement 1/30/2019.  Per report patient did have purulent drainage noted during cystoscopy.  --IV antibiotics: Started zosyn to cover for E. Coli isolate, then on ceftriaxone.  Started on 1/30.  We planned on 14 days total.  End date was 2/13.  --Blood and urine cultures positive for E coli.  --Extubated as of 2/8/19.     Hypercapnic respiratory failure present on admission.  Still with some hypoxia likely related to volume overload from resolved sepsis and associated necessary management with severe deconditioning and atelectasis.  --Likely multifactorial from sepsis and chronic narcotic use.  --Was intubated  "perioperatively and remained intubated following the procedure until 2/8.  --Stop IV diuresis.     Acute renal failure with left hydronephrosis: Resolved and now at baseline  --Secondary to obstructive uropathy.  Creatinine on admission 2.25, now with creatinine at baseline.  --Status post a stent placement; anticipate outpatient follow-up for definitive treatment of the stone and removal of the stent.     New Atrial flutter provoked by urosepsis.  Stable without recurrence.  --Status post DC cardioversion 1/31/2019  --Appreciate cardiology consult  --Cardiac echo when compared to prior study showed minimal deterioration of left ventricular systolic function.  EF 50-55%.  --No anticoagulation for now per cardiology patient was in atrial flutter for less than 8 hours.  --Switched to oral amiodarone for a total of 2 weeks (now on 200 mg daily).  Will stop at the time of discharge.     Right femoral neck fracture  --Incidental finding on CT.  Likely present for quite some time.  --Patient has MS and at baseline is able to pivot herself.  --At this time, pt notes that the right hip is painful with vigorous movement.  --Orthopedic surgery was consulted.  No immediate intervention recommended at this time.     Type 2 diabetes mellitus, \"borderline\"  --At baseline on metformin will hold for now  --Sliding scale insulin     MS with associated chronic pain  --Prior to admission on amitriptyline, baclofen, duloxetine, gabapentin and oxycodone.  --PT evaluation and TCU planned     Significant leukocytosis: Improving  --Patient has history of T-cell non-Hodgkin's lymphoma status post treatment with baseline elevated white cell count (mid-teens).      H/o Hypertension  --Resumed losartan and hydrochlorothiazide (not quite at pre-hospitalization doses).  --BP OK.     Bilateral inguinal wounds  --Managed with topical wound cares.  --Should likely have home help with management of these upon discharge.     Constipation: In the " setting of scheduled narcotic use.  Continue laxatives, give enema.    The patient was seen, examined, and counseled on this day. The hospitalization and plan of care was reviewed with the patient extensively. All questions were addressed and the patient agreed to follow-up as noted above.      Total time spent in face to face contact with the patient and coordinating discharge was:  60 Minutes    Isaak Henao DO, MPH  [facility]  [address] [place] [address]  Pager: [phone]  02/15/2019

## 2019-02-15 NOTE — PLAN OF CARE
A&Ox4, VSS, 94% sats on 3L/NC.  LS diminished, infrequent cough.  Encouraged IS use.  Scheduled oxycodone for pain.  Bobo with 875CC output.  Smear of stool, passing flatus, denies abdominal pain or nausea.  Up to chair with lift/assist 2.  Tolerating regular diet, good appetite.  Sleeping between cares.  Groin skin cares done and assessed by WOC today.  LLE with scab, [place].  Breann rectal area blanchable red.  Barrier applied.  Tele:  1st degree AVB.  Plan for discharge to TCU when bed available.  Continue to monitor.

## 2019-02-15 NOTE — PROGRESS NOTES
D:  Per record review, pt's discharge recommendation is TCU.    I:  Pt and family identified [facility] as a TCU that they would like a referral sent to.  Sw sent the referral to [facility] for a discharge today.    A/P:  Sw will continue with discharge planning and will be available as needed until discharge.    Addendum:  [facility] does not have any openings at this time.  Estephania met with the pt who identified [facility] as their next choice for a facility.  Estephania provided the pt with a list to identify additional facilities to send referrals to.  Pt prefers a private room, but it will depend on if the only cost she will have is for the room or if there will be additional costs for the room that insurance will not pay for.  She said that she will speak with her  and together they will identify additional facilities.  Sw will need to follow for additional choices.  Estephania sent the referral to [facility].

## 2019-02-15 NOTE — PLAN OF CARE
Discharge Planner PT     Patient plan for discharge: TCU  Current status: [facility] to [facility], declined attempt for transfer  Barriers to return to prior living situation: Pt alone during day, current level of assist  Recommendations for discharge: TCU, will require at least WC transport per plan established by the PT.    Rationale for recommendations: Pt significantly below baseline with all mobility.  Continued skilled PT to increase overall strength/ROM, increase independence with bed mobility and transfers and increase activity tolerance to enable pt to return to her home at prior level of function or with least level of assist.       Entered by: Florecita Chambers 02/15/2019 1:02 PM      Physical Therapy Discharge Summary     Reason for therapy discharge:    Discharged to transitional care facility.     Progress towards therapy goal(s). See goals on Care Plan in Epic electronic health record for goal details.  Goals not met.  Barriers to achieving goals:   limited tolerance for therapy.     Therapy recommendation(s):    Continued therapy is recommended.  Rationale/Recommendations:  Pt is below baseline with functional mobility and strength and would benefit from continued PT to progress skills.

## 2019-02-15 NOTE — PLAN OF CARE
VSS alert/oriented complained of chronic generalized pain on scheduled oxycodone. Ambulates with AX2 with a walker ramos catheter in place and patent.  Inter dry on place on abdominal folds. +3 edema on BLE on IV lasix with good output. X1 looses stool this shift after bowel meds given for constipation. Tel NSR w/1*AVB.

## 2019-02-15 NOTE — PROGRESS NOTES
"[facility] Nurse Inpatient Wound Assessment     Assessment of wound(s) on pt's:   Bilateral groin folds/ Buttocks        Data:   Patient History:      per MD note(s):  53 year old woman with advanced MS (able to pivot) who resides at home with her .  She was brought to the ED by EMS due to weakness and apparent increased somnolence. He recognized that she probably had a UTI, \"because this is how she gets with those\".    See MD note for complete history.      Moisture Management:  Urinary Catheter    Catheter secured? Yes  Current Diet / Nutrition:   Orders Placed This Encounter      Regular Diet Adult      Advance Diet as Tolerated              Issac Assessment and sub scores:   Issac Risk Assessment Issac Risk Assessment    Issac Risk Assessment    Sensory Perception: 3-->slightly limited    Moisture: 3-->occasionally moist   Activity: 2-->chairfast     Mobility: 2-->very limited   Nutrition: 3-->adequate   Issac Score: 14       Mattress:  Standard , Pulsate  Labs:     Recent Labs   Lab Test 02/15/19  0634  02/12/19  0555  02/09/19  2219  02/04/19  0545  10/03/13  1614   ALBUMIN  --   --  2.1*  --   --   --   --    < > 3.6*   HGB 10.3*   < >  --    < >  --    < > 10.3*   < > 12.7   INR  --   --   --   --   --   --   --   --  0.93   WBC 12.7*   < >  --    < >  --    < > 21.6*   < > 14.4*   A1C  --   --   --   --  6.6*  --   --    < >  --    CRP  --   --   --   --   --   --  57.7*   < >  --     < > = values in this interval not displayed.        Wound Assessment (location ):   Bilateral groin skin folds  Wound History:  Suspected fungal infection with Hydradenitis, skin bridges no tunneling noted    Specific Dimensions (length x width x depth, in cm):       Right Groin: entire area: 1 open area 3 x 0.4 x 0.1cm moist pink. Scant bloody drainage,     Left Groin: almost completely healed, skin bridge is closing and much improved, chronic wounds, minimal  scant bloody drainage    Periwound " Skin: no rash or erythema, improved fungal condition    Odor: none    Pain: no complaint    Pressure Wound Assessment:   Bilateral buttocks- fleshy area    Wound History:   See above, new consult    Specific Dimensions (length x width x depth, in cm) :  Very superficial erosion across buttocks with blanchable erythema.       Periwound Skin: intact,      Color: normal and consistent with surrounding tissue    Temperature  normal    Drainage:  none    Pain: no complaint    Unsure if related to pressure since the area is not directly over a bony prominence, skin issue improved, healing and stable. Appears consistent with friction; moisture erosion due to high temps and now improved          Intervention:     Patient's chart evaluated.      Wound(s) was assessed    Wound Care: was done:  Per POC    Orders  In place    Supplies  ordered: per POC and discussed with RN    Discussed plan of care with Nurse and pt          Assessment:       Healed  fungal infection; chronic hydradenitis skin condition improved and healing with current treatment.  Continue with Triad alone, please DO NOT use any Moist gauze or excessive paste.  Bilateral buttocks improved and blanchable skin.          Plan:     Nursing to notify the Provider(s) and re-consult the [facility] Nurse if wound(s) deteriorate(s) or if the wound care plan needs reevaluation.    Plan of care for wound located on bilateral groin: Daily and PRN1. Wash wounds with wound spray and pat dry    2. Wash periwound with Tereza and soft cloth    3. Apply very small amount of Triad paste to OPEN wounds    6. DO NOT USE MOIST GAUZE to wounds        Wound care to Buttocks:BID and PRN    1. Gently clean skin with Tereza. Apply Light layer of Triad paste or Critic aid paste to buttocks.     2. Avoid Mepilex    3. Pressure Prevention: turn side to side, avoid pressure points, heel lift boots, Sween to dry skin    WO Nurse will return: weekly and PRN

## 2019-02-16 LAB
ANION GAP SERPL CALCULATED.3IONS-SCNC: 3 MMOL/L (ref 3–14)
BUN SERPL-MCNC: 23 MG/DL (ref 7–30)
CALCIUM SERPL-MCNC: 8.6 MG/DL (ref 8.5–10.1)
CHLORIDE SERPL-SCNC: 97 MMOL/L (ref 94–109)
CO2 SERPL-SCNC: 39 MMOL/L (ref 20–32)
CREAT SERPL-MCNC: 0.84 MG/DL (ref 0.52–1.04)
GFR SERPL CREATININE-BSD FRML MDRD: 78 ML/MIN/{1.73_M2}
GLUCOSE BLDC GLUCOMTR-MCNC: 103 MG/DL (ref 70–99)
GLUCOSE BLDC GLUCOMTR-MCNC: 106 MG/DL (ref 70–99)
GLUCOSE BLDC GLUCOMTR-MCNC: 122 MG/DL (ref 70–99)
GLUCOSE BLDC GLUCOMTR-MCNC: 99 MG/DL (ref 70–99)
GLUCOSE SERPL-MCNC: 91 MG/DL (ref 70–99)
POTASSIUM SERPL-SCNC: 4.2 MMOL/L (ref 3.4–5.3)
SODIUM SERPL-SCNC: 139 MMOL/L (ref 133–144)

## 2019-02-16 PROCEDURE — 25000132 ZZH RX MED GY IP 250 OP 250 PS 637: Performed by: HOSPITALIST

## 2019-02-16 PROCEDURE — 36415 COLL VENOUS BLD VENIPUNCTURE: CPT | Performed by: HOSPITALIST

## 2019-02-16 PROCEDURE — 25000128 H RX IP 250 OP 636: Performed by: HOSPITALIST

## 2019-02-16 PROCEDURE — 25000132 ZZH RX MED GY IP 250 OP 250 PS 637: Performed by: INTERNAL MEDICINE

## 2019-02-16 PROCEDURE — 80048 BASIC METABOLIC PNL TOTAL CA: CPT | Performed by: HOSPITALIST

## 2019-02-16 PROCEDURE — 12000000 ZZH R&B MED SURG/OB

## 2019-02-16 PROCEDURE — 25000128 H RX IP 250 OP 636: Performed by: SURGERY

## 2019-02-16 PROCEDURE — 99232 SBSQ HOSP IP/OBS MODERATE 35: CPT | Performed by: HOSPITALIST

## 2019-02-16 PROCEDURE — 00000146 ZZHCL STATISTIC GLUCOSE BY METER IP

## 2019-02-16 RX ORDER — FUROSEMIDE 40 MG
40 TABLET ORAL DAILY
Qty: 30 TABLET | Refills: 0 | DISCHARGE
Start: 2019-02-16 | End: 2019-03-28

## 2019-02-16 RX ADMIN — HEPARIN SODIUM 5000 UNITS: 5000 INJECTION, SOLUTION INTRAVENOUS; SUBCUTANEOUS at 23:53

## 2019-02-16 RX ADMIN — ACETAMINOPHEN 650 MG: 325 TABLET, FILM COATED ORAL at 21:29

## 2019-02-16 RX ADMIN — HYDROCHLOROTHIAZIDE 12.5 MG: 12.5 CAPSULE ORAL at 08:09

## 2019-02-16 RX ADMIN — ATORVASTATIN CALCIUM 10 MG: 10 TABLET, FILM COATED ORAL at 08:11

## 2019-02-16 RX ADMIN — DULOXETINE HYDROCHLORIDE 60 MG: 30 CAPSULE, DELAYED RELEASE ORAL at 08:08

## 2019-02-16 RX ADMIN — GABAPENTIN 600 MG: 600 TABLET, FILM COATED ORAL at 15:13

## 2019-02-16 RX ADMIN — GABAPENTIN 600 MG: 600 TABLET, FILM COATED ORAL at 08:08

## 2019-02-16 RX ADMIN — OXYCODONE HYDROCHLORIDE 15 MG: 5 TABLET ORAL at 23:53

## 2019-02-16 RX ADMIN — DULOXETINE HYDROCHLORIDE 60 MG: 30 CAPSULE, DELAYED RELEASE ORAL at 21:29

## 2019-02-16 RX ADMIN — LOSARTAN POTASSIUM 50 MG: 50 TABLET, FILM COATED ORAL at 08:08

## 2019-02-16 RX ADMIN — FUROSEMIDE 40 MG: 10 INJECTION, SOLUTION INTRAMUSCULAR; INTRAVENOUS at 06:22

## 2019-02-16 RX ADMIN — POLYETHYLENE GLYCOL 3350 17 G: 17 POWDER, FOR SOLUTION ORAL at 08:08

## 2019-02-16 RX ADMIN — OXYCODONE HYDROCHLORIDE 15 MG: 5 TABLET ORAL at 17:36

## 2019-02-16 RX ADMIN — GABAPENTIN 600 MG: 600 TABLET, FILM COATED ORAL at 21:29

## 2019-02-16 RX ADMIN — BACLOFEN 10 MG: 10 TABLET ORAL at 15:14

## 2019-02-16 RX ADMIN — HEPARIN SODIUM 5000 UNITS: 5000 INJECTION, SOLUTION INTRAVENOUS; SUBCUTANEOUS at 08:09

## 2019-02-16 RX ADMIN — AMIODARONE HYDROCHLORIDE 200 MG: 200 TABLET ORAL at 08:09

## 2019-02-16 RX ADMIN — BACLOFEN 10 MG: 10 TABLET ORAL at 21:29

## 2019-02-16 RX ADMIN — HEPARIN SODIUM 5000 UNITS: 5000 INJECTION, SOLUTION INTRAVENOUS; SUBCUTANEOUS at 15:15

## 2019-02-16 RX ADMIN — OXYCODONE HYDROCHLORIDE 15 MG: 5 TABLET ORAL at 11:54

## 2019-02-16 RX ADMIN — ASPIRIN 81 MG 162 MG: 81 TABLET ORAL at 08:08

## 2019-02-16 RX ADMIN — BACLOFEN 10 MG: 10 TABLET ORAL at 08:09

## 2019-02-16 RX ADMIN — OXYCODONE HYDROCHLORIDE 15 MG: 5 TABLET ORAL at 06:22

## 2019-02-16 RX ADMIN — SENNOSIDES AND DOCUSATE SODIUM 3 TABLET: 8.6; 5 TABLET ORAL at 08:08

## 2019-02-16 RX ADMIN — METOPROLOL SUCCINATE 50 MG: 50 TABLET, EXTENDED RELEASE ORAL at 08:09

## 2019-02-16 RX ADMIN — AMITRIPTYLINE HYDROCHLORIDE 100 MG: 50 TABLET, FILM COATED ORAL at 21:29

## 2019-02-16 RX ADMIN — BISACODYL 10 MG: 10 SUPPOSITORY RECTAL at 16:43

## 2019-02-16 ASSESSMENT — ACTIVITIES OF DAILY LIVING (ADL)
ADLS_ACUITY_SCORE: 26
ADLS_ACUITY_SCORE: 27
ADLS_ACUITY_SCORE: 26
ADLS_ACUITY_SCORE: 29
ADLS_ACUITY_SCORE: 26
ADLS_ACUITY_SCORE: 27

## 2019-02-16 ASSESSMENT — MIFFLIN-ST. JEOR: SCORE: 1704.9

## 2019-02-16 NOTE — PROGRESS NOTES
[facility]    Hospitalist Progress Note  Name: Amanda Richardson    MRN: [id number]  Provider:  Isaak Henao DO MPH  Date of Service: 02/16/2019    Summary of Stay: Summary of Stay: Amanda Richardson is a 53 year old female with past medical history significant for advanced MS, diabetes mellitus, hypertension, history of T-cell lymphoma status post treatment, chronically elevated WBC count, history of recurrent UTIs who presented with altered mental status increased weakness and somnolence.  Evaluation in the emergency room showed significant leukocytosis, hypotension, acute renal failure, obstructing left kidney stone with moderate hydronephrosis and respiratory failure with acidosis with hypercapnia.  She was admitted was admitted on 1/30/2019 with septic shock.  Patient received IV antibiotics and fluid resuscitation in the emergency room and underwent emergent cystoscopy and left retrograde pyelogram with stent placement on 1/30/2019.  CT also showed closed fracture of right femur.  She was initially placed on BiPAP for respiratory failure but perioperatively was intubated. she was admitted to ICU for further evaluation.     1/31/2019 patient developed wide-complex tachycardia appeared to be SVT failed to respond to vasovagal maneuvers.  Adenosine was given which showed rhythm to be atrial flutter she received cardioversion and was started on amiodarone.  Angiotensin II drip was initiated briefly for hypotension.  She is now off pressors as well as sedative meds.      Continues to improve. Pt extubated successfully on 2/8. Now able to eat per SLP. Pt interested in PT and OT.   She was transferred to the medical floor on 2/10/19.  Ready for discharge when SW arrangements made.     1.  Septic shock likely due to urinary tract source (altered mental status, acute renal failure, hypotension, leukocytosis, acidosis, respiratory failure).  She received aggressive fluid resuscitation along with initiation  of pressors prior to being taken to the operating room.  She now is stable and shock is fully resolved.  --CT scan on admission showed obstructing stone with hydronephrosis and acute renal failure  --Status post cystoscopy and stent placement 1/30/2019.  Per report patient did have purulent drainage noted during cystoscopy.  --IV antibiotics: started zosyn to cover for E. Coli isolate, now on ceftriaxone.  Started on 1/30.  We will plan on 14 days total.  End date was 2/13.  --Blood and urine cultures positive for E coli  --Extubated as of 2/8/19     2.  Hypercapnic respiratory failure present on admission.  Still with some hypoxia likely related to volume overload from resolved sepsis and associated necessary management with severe deconditioning and atelectasis.  --Likely multifactorial from sepsis and chronic narcotic use  --Was intubated perioperatively and remained intubated following the procedure until 2/8.  --Continue IV diuresisis while here and start PO lasix on discharge     3.  Acute renal failure with left hydronephrosis: Resolved and now at baseline  --Secondary to obstructive uropathy.  Creatinine on admission 2.25, now with creatinine 0.6 -0.7  --Status post a stent placement; anticipate outpatient follow-up for definitive treatment of the stone and removal of the stent.     4.  New Atrial flutter provoked by urosepsis.  Stable without recurrence.  --Status post DC cardioversion 1/31/2019  --Appreciate cardiology consult  --Cardiac echo when compared to prior study showed minimal deterioration of left ventricular systolic function.  EF 50-55%  --No anticoagulation for now per cardiology patient was in atrial flutter for less than 8 hours  --Switched to oral amiodarone for a total of 2 weeks (now on 200 mg daily).  Probably can stop at the time of discharge.     Right femoral neck fracture  --Incidental finding on CT.  Likely present for quite some time.  --Patient has MS and at baseline is able to  "pivot herself  --at this time, pt notes that the right hip is painful with vigorous movement.  --Orthopedic surgery was consulted.  No immediate intervention recommended at this time, defer to their expertise re: ongoing management/weight bearing etc.     Type 2 diabetes mellitus, \"borderline\"  --At baseline on metformin will hold for now  --Sliding scale insulin     MS with associated chronic pain  --Prior to admission on amitriptyline, baclofen, duloxetine, gabapentin and oxycodone  --PT evaluation     Significant leukocytosis: Improving  --Patient has history of T-cell non-Hodgkin's lymphoma status post treatment with baseline elevated white cell count (mid-teens)     H/o Hypertension  --resumed losartan and hydrochlorothiazide (not quite at pre-hospitalization doses)     Bilateral inguinal wounds  --managed with topical wound cares.  --should likely have home help with management of these upon discharge     Constipation: In the setting of scheduled narcotic use.  Continue laxatives, give enema.  Now resolved.     DVT Prophylaxis: Pneumatic Compression Devices.  Code Status: Full Code.  Disposition: Medically stable for discharge when placement found.       Interval History   The patient reports doing well. No chest pain or shortness of breath. No nausea, vomiting, diarrhea, constipation. No fevers. No other specific complaints identified.     -Data reviewed today: I personally reviewed all new labs and imaging results over the last 24 hours.     Physical Exam   Temp: 96.9  F (36.1  C) Temp src: Axillary BP: 138/60   Heart Rate: 68 Resp: 20 SpO2: 92 % O2 Device: Nasal cannula with humidification Oxygen Delivery: 3 LPM  Vitals:    02/13/19 0527 02/14/19 0654 02/16/19 0628   Weight: 114.8 kg (253 lb 1.6 oz) 112.5 kg (248 lb) 112.5 kg (247 lb 15.9 oz)     Vital Signs with Ranges  Temp:  [96.9  F (36.1  C)-98.3  F (36.8  C)] 96.9  F (36.1  C)  Heart Rate:  [68-81] 68  Resp:  [16-20] 20  BP: (120-138)/(51-60) " 138/60  SpO2:  [90 %-93 %] 92 %  I/O last 3 completed shifts:  In: 1220 [P.O.:1200; I.V.:20]  Out: 3325 [Urine:3325]    GENERAL: No apparent distress. Awake, alert, and fully oriented.  HEENT: Normocephalic, atraumatic. Extraocular movements intact.  CARDIOVASCULAR: Regular rate and rhythm without murmurs or rubs. No S3.  PULMONARY: Clear bilaterally.  GASTROINTESTINAL: Soft, non-tender, non-distended. Bowel sounds normoactive.   EXTREMITIES: No cyanosis or clubbing. 2+ edema.  NEUROLOGICAL: CN 2-12 grossly intact, no focal neurological deficits.  DERMATOLOGICAL: No rash, ulcer, bruising, nor jaundice.     Medications     sodium chloride Stopped (02/10/19 0900)     sodium chloride Stopped (02/09/19 1400)       amitriptyline  100 mg Oral or Feeding Tube At Bedtime     aspirin  162 mg Oral or Feeding Tube Daily     atorvastatin  10 mg Oral or Feeding Tube Daily     baclofen  10 mg Oral or Feeding Tube TID     bisacodyl  10 mg Rectal Daily     DULoxetine  60 mg Oral BID     gabapentin  600 mg Oral TID     heparin  5,000 Units Subcutaneous Q8H     hydrochlorothiazide  12.5 mg Oral or Feeding Tube Daily     insulin aspart  1-7 Units Subcutaneous TID AC     insulin aspart  1-5 Units Subcutaneous At Bedtime     losartan  50 mg Oral or Feeding Tube Daily     metoprolol succinate ER  50 mg Oral Daily     oxyCODONE  15 mg Oral Q6H     polyethylene glycol  17 g Oral BID     senna-docusate  3 tablet Oral BID     sodium chloride (PF)  10 mL Intracatheter Q7 Days     Data     Laboratory:  Recent Labs   Lab 02/15/19  0634 02/14/19  0716 02/13/19  0818   WBC 12.7* 11.3* 13.6*   HGB 10.3* 10.0* 9.8*   HCT 34.0* 33.7* 32.4*   MCV 87 88 86   * 541* 607*     Recent Labs   Lab 02/16/19  0621 02/14/19  0716 02/13/19  0818    138 138   POTASSIUM 4.2 4.1 3.8   CHLORIDE 97 97 98   CO2 39* 36* 33*   ANIONGAP 3 5 7   GLC 91 106* 135*   BUN 23 21 21   CR 0.84 0.76 0.78   GFRESTIMATED 78 87 85   GFRESTBLACK 90 >90 >90   MARY 8.6  8.4* 8.1*     No results for input(s): CULT in the last 168 hours.    Imaging:  No results found for this or any previous visit (from the past 24 hour(s)).      Isaak Henao DO MPH  [facility] Hospitalist  [address] [place] [address]  Pager: [phone]  02/16/2019

## 2019-02-16 NOTE — PLAN OF CARE
SS alert/oriented complained of chronic generalized pain on scheduled oxycodone. Ambulates with AX2 with a walker ramos catheter in place and patent.  Inter dry on place on abdominal folds. +3 edema on BLE on IV lasix with good output. Tele NSR w/1*AVB.

## 2019-02-16 NOTE — PLAN OF CARE
VSS. Denies any SOB; on 3L O2 sats at 94%; Lung sounds diminished. C/O Right hip and chronic back pain; on scheduled oxycodone. Tele SR with first degree block. PICC saline locked. On regular diet; good appetite;  and 136. Pt had a small stool this PM; on scheduled miralax for constipation.  Breann-rectal pink/redness blanchable, triad paste applied for protection barrier and t&r; Jan groin pink/redness blanchable, triad paste applied for protection barrier. Generalized edema; on IV lasix. Bobo output 1500 this PM. PT/OT/WOC following. Plan is to discharge pending TCU placement.

## 2019-02-16 NOTE — PROGRESS NOTES
Discharge Planner   Discharge Plans in progress: Yes. Additional referrals have been sent to [facility], [facility], and [place] [facility].   Barriers to discharge plan: No rooms available for referrals already made.   Follow up plan:SW will follow up with with the new referrals to find out if there will be openings.       Entered by: Omaira Kelsey 02/16/2019 10:57 AM

## 2019-02-17 LAB
GLUCOSE BLDC GLUCOMTR-MCNC: 108 MG/DL (ref 70–99)
GLUCOSE BLDC GLUCOMTR-MCNC: 108 MG/DL (ref 70–99)
GLUCOSE BLDC GLUCOMTR-MCNC: 122 MG/DL (ref 70–99)
GLUCOSE BLDC GLUCOMTR-MCNC: 77 MG/DL (ref 70–99)
GLUCOSE BLDC GLUCOMTR-MCNC: 85 MG/DL (ref 70–99)

## 2019-02-17 PROCEDURE — 12000000 ZZH R&B MED SURG/OB

## 2019-02-17 PROCEDURE — 25000128 H RX IP 250 OP 636: Performed by: SURGERY

## 2019-02-17 PROCEDURE — 25000132 ZZH RX MED GY IP 250 OP 250 PS 637: Performed by: INTERNAL MEDICINE

## 2019-02-17 PROCEDURE — 99232 SBSQ HOSP IP/OBS MODERATE 35: CPT | Performed by: HOSPITALIST

## 2019-02-17 PROCEDURE — 00000146 ZZHCL STATISTIC GLUCOSE BY METER IP

## 2019-02-17 RX ADMIN — SALINE NASAL SPRAY 2 SPRAY: 1.5 SOLUTION NASAL at 17:08

## 2019-02-17 RX ADMIN — HEPARIN SODIUM 5000 UNITS: 5000 INJECTION, SOLUTION INTRAVENOUS; SUBCUTANEOUS at 23:53

## 2019-02-17 RX ADMIN — BACLOFEN 10 MG: 10 TABLET ORAL at 08:57

## 2019-02-17 RX ADMIN — OXYCODONE HYDROCHLORIDE 15 MG: 5 TABLET ORAL at 12:01

## 2019-02-17 RX ADMIN — HYDROCHLOROTHIAZIDE 12.5 MG: 12.5 CAPSULE ORAL at 08:58

## 2019-02-17 RX ADMIN — GABAPENTIN 600 MG: 600 TABLET, FILM COATED ORAL at 21:42

## 2019-02-17 RX ADMIN — HEPARIN SODIUM 5000 UNITS: 5000 INJECTION, SOLUTION INTRAVENOUS; SUBCUTANEOUS at 17:09

## 2019-02-17 RX ADMIN — OXYCODONE HYDROCHLORIDE 15 MG: 5 TABLET ORAL at 17:08

## 2019-02-17 RX ADMIN — METOPROLOL SUCCINATE 50 MG: 50 TABLET, EXTENDED RELEASE ORAL at 08:58

## 2019-02-17 RX ADMIN — GABAPENTIN 600 MG: 600 TABLET, FILM COATED ORAL at 17:09

## 2019-02-17 RX ADMIN — ASPIRIN 81 MG 162 MG: 81 TABLET ORAL at 08:59

## 2019-02-17 RX ADMIN — OXYCODONE HYDROCHLORIDE 15 MG: 5 TABLET ORAL at 23:53

## 2019-02-17 RX ADMIN — HEPARIN SODIUM 5000 UNITS: 5000 INJECTION, SOLUTION INTRAVENOUS; SUBCUTANEOUS at 09:01

## 2019-02-17 RX ADMIN — LOSARTAN POTASSIUM 50 MG: 50 TABLET, FILM COATED ORAL at 08:59

## 2019-02-17 RX ADMIN — DULOXETINE HYDROCHLORIDE 60 MG: 30 CAPSULE, DELAYED RELEASE ORAL at 08:57

## 2019-02-17 RX ADMIN — AMITRIPTYLINE HYDROCHLORIDE 100 MG: 50 TABLET, FILM COATED ORAL at 21:42

## 2019-02-17 RX ADMIN — DULOXETINE HYDROCHLORIDE 60 MG: 30 CAPSULE, DELAYED RELEASE ORAL at 21:42

## 2019-02-17 RX ADMIN — ATORVASTATIN CALCIUM 10 MG: 10 TABLET, FILM COATED ORAL at 08:56

## 2019-02-17 RX ADMIN — BACLOFEN 10 MG: 10 TABLET ORAL at 17:09

## 2019-02-17 RX ADMIN — POLYETHYLENE GLYCOL 3350 17 G: 17 POWDER, FOR SOLUTION ORAL at 09:00

## 2019-02-17 RX ADMIN — BACLOFEN 10 MG: 10 TABLET ORAL at 21:42

## 2019-02-17 RX ADMIN — GABAPENTIN 600 MG: 600 TABLET, FILM COATED ORAL at 08:57

## 2019-02-17 RX ADMIN — OXYCODONE HYDROCHLORIDE 15 MG: 5 TABLET ORAL at 06:02

## 2019-02-17 ASSESSMENT — ACTIVITIES OF DAILY LIVING (ADL)
ADLS_ACUITY_SCORE: 27
ADLS_ACUITY_SCORE: 25
ADLS_ACUITY_SCORE: 27
ADLS_ACUITY_SCORE: 25
ADLS_ACUITY_SCORE: 27
ADLS_ACUITY_SCORE: 25

## 2019-02-17 ASSESSMENT — MIFFLIN-ST. JEOR: SCORE: 1713.99

## 2019-02-17 NOTE — PLAN OF CARE
Assumed care from 1900 to 0730  A&Ox4, up Ax2 w/lift  LDA: PICC SL  Vitals: stable, 3L/NC  Pain: chronic, sched oxycodone   & 108, regular diet  Skin: Wound bilat groin, joan red bottom  GI/: Bobo patent, smear BM this shift  Followed by PT, WOCN, Urology, SW  Plan: awaiting TCU placement  Will continue to monitor

## 2019-02-17 NOTE — PLAN OF CARE
"  Lookout: A&O  VS: /63 (BP Location: Left arm)   Pulse 76   Temp 98.3  F (36.8  C) (Oral)   Resp 18   Ht 1.626 m (5' 4\")   Wt 112.5 kg (247 lb 15.9 oz)   LMP 12/11/2011   SpO2 93%   BMI 42.57 kg/m    LS: coarse, infreq cough, on 3LNC  GI: active, BM today, incontinent  : ramos, patent  Skin: groin wounds, cleansed, triad paste applied. Coccyx joan red.  Activity: lift   Diet: reg   Pain: c/o 6/10 back pain, takes sched oxy  Lab: BS 77, 108  Plan: waiting TCU placement           "

## 2019-02-17 NOTE — PLAN OF CARE
Pt A&Ox4, VSS, on 3L of O2, unable to wean. LS diminished, tele discontinued. 2 BM this shift, Ax2 w/ lift to commode and chair. Jihan patent, PICC, awaiting placement at U

## 2019-02-17 NOTE — PROGRESS NOTES
[facility]    Hospitalist Progress Note  Name: Amanda Richardson    MRN: [id number]  Provider:  Isaak Henao DO MPH  Date of Service: 02/17/2019    Summary of Stay: Summary of Stay: Amanda Richardson is a 53 year old female with past medical history significant for advanced MS, diabetes mellitus, hypertension, history of T-cell lymphoma status post treatment, chronically elevated WBC count, history of recurrent UTIs who presented with altered mental status increased weakness and somnolence.  Evaluation in the emergency room showed significant leukocytosis, hypotension, acute renal failure, obstructing left kidney stone with moderate hydronephrosis and respiratory failure with acidosis with hypercapnia.  She was admitted was admitted on 1/30/2019 with septic shock.  Patient received IV antibiotics and fluid resuscitation in the emergency room and underwent emergent cystoscopy and left retrograde pyelogram with stent placement on 1/30/2019.  CT also showed closed fracture of right femur.  She was initially placed on BiPAP for respiratory failure but perioperatively was intubated. she was admitted to ICU for further evaluation.     1/31/2019 patient developed wide-complex tachycardia appeared to be SVT failed to respond to vasovagal maneuvers.  Adenosine was given which showed rhythm to be atrial flutter she received cardioversion and was started on amiodarone.  Angiotensin II drip was initiated briefly for hypotension.  She is now off pressors as well as sedative meds.      Continues to improve. Pt extubated successfully on 2/8. Now able to eat per SLP. Pt interested in PT and OT.   She was transferred to the medical floor on 2/10/19.  Ready for discharge when SW arrangements made.     1.  Septic shock likely due to urinary tract source (altered mental status, acute renal failure, hypotension, leukocytosis, acidosis, respiratory failure).  She received aggressive fluid resuscitation along with initiation  of pressors prior to being taken to the operating room.  She now is stable and shock is fully resolved.  --CT scan on admission showed obstructing stone with hydronephrosis and acute renal failure  --Status post cystoscopy and stent placement 1/30/2019.  Per report patient did have purulent drainage noted during cystoscopy.  --IV antibiotics: started zosyn to cover for E. Coli isolate, now on ceftriaxone.  Started on 1/30.  We will plan on 14 days total.  End date was 2/13.  --Blood and urine cultures positive for E coli  --Extubated as of 2/8/19     2.  Hypercapnic respiratory failure present on admission.  Still with some hypoxia likely related to volume overload from resolved sepsis and associated necessary management with severe deconditioning and atelectasis.  --Likely multifactorial from sepsis and chronic narcotic use  --Was intubated perioperatively and remained intubated following the procedure until 2/8.  --Continue IV diuresisis while here and start PO lasix on discharge     3.  Acute renal failure with left hydronephrosis: Resolved and now at baseline  --Secondary to obstructive uropathy.  Creatinine on admission 2.25, now with creatinine 0.6 -0.7  --Status post a stent placement; anticipate outpatient follow-up for definitive treatment of the stone and removal of the stent.     4.  New Atrial flutter provoked by urosepsis.  Stable without recurrence.  --Status post DC cardioversion 1/31/2019  --Appreciate cardiology consult  --Cardiac echo when compared to prior study showed minimal deterioration of left ventricular systolic function.  EF 50-55%  --No anticoagulation for now per cardiology patient was in atrial flutter for less than 8 hours  --Switched to oral amiodarone for a total of 2 weeks (now on 200 mg daily).  Probably can stop at the time of discharge.     Right femoral neck fracture  --Incidental finding on CT.  Likely present for quite some time.  --Patient has MS and at baseline is able to  "pivot herself  --at this time, pt notes that the right hip is painful with vigorous movement.  --Orthopedic surgery was consulted.  No immediate intervention recommended at this time, defer to their expertise re: ongoing management/weight bearing etc.     Type 2 diabetes mellitus, \"borderline\"  --At baseline on metformin will hold for now  --Sliding scale insulin     MS with associated chronic pain  --Prior to admission on amitriptyline, baclofen, duloxetine, gabapentin and oxycodone  --PT evaluation     Significant leukocytosis: Improving  --Patient has history of T-cell non-Hodgkin's lymphoma status post treatment with baseline elevated white cell count (mid-teens)     H/o Hypertension  --resumed losartan and hydrochlorothiazide (not quite at pre-hospitalization doses)     Bilateral inguinal wounds  --managed with topical wound cares.  --should likely have home help with management of these upon discharge     Constipation: In the setting of scheduled narcotic use.  Continue laxatives, give enema.  Now resolved.     DVT Prophylaxis: Pneumatic Compression Devices.  Code Status: Full Code.  Disposition: Medically stable for discharge when placement found.       Interval History   The patient reports doing well. No chest pain or shortness of breath. No nausea, vomiting, diarrhea, constipation. No fevers. No other specific complaints identified.     -Data reviewed today: I personally reviewed all new labs and imaging results over the last 24 hours.     Physical Exam   Temp: 98.3  F (36.8  C) Temp src: Oral BP: 137/63   Heart Rate: 84 Resp: 18 SpO2: 93 % O2 Device: Nasal cannula Oxygen Delivery: 3 LPM  Vitals:    02/13/19 0527 02/14/19 0654 02/16/19 0628   Weight: 114.8 kg (253 lb 1.6 oz) 112.5 kg (248 lb) 112.5 kg (247 lb 15.9 oz)     Vital Signs with Ranges  Temp:  [97.9  F (36.6  C)-98.5  F (36.9  C)] 98.3  F (36.8  C)  Heart Rate:  [68-84] 84  Resp:  [16-18] 18  BP: (116-137)/(48-63) 137/63  SpO2:  [90 %-98 %] 93 " %  I/O last 3 completed shifts:  In: 520 [P.O.:500; I.V.:20]  Out: 3300 [Urine:3300]    GENERAL: No apparent distress. Awake, alert, and fully oriented.  HEENT: Normocephalic, atraumatic. Extraocular movements intact.  CARDIOVASCULAR: Regular rate and rhythm without murmurs or rubs. No S3.  PULMONARY: Clear bilaterally.  GASTROINTESTINAL: Soft, non-tender, non-distended. Bowel sounds normoactive.   EXTREMITIES: No cyanosis or clubbing. 2+ edema.  NEUROLOGICAL: CN 2-12 grossly intact, no focal neurological deficits.  DERMATOLOGICAL: No rash, ulcer, bruising, nor jaundice.     Medications     sodium chloride Stopped (02/10/19 0900)     sodium chloride Stopped (02/09/19 1400)       amitriptyline  100 mg Oral or Feeding Tube At Bedtime     aspirin  162 mg Oral or Feeding Tube Daily     atorvastatin  10 mg Oral or Feeding Tube Daily     baclofen  10 mg Oral or Feeding Tube TID     bisacodyl  10 mg Rectal Daily     DULoxetine  60 mg Oral BID     gabapentin  600 mg Oral TID     heparin  5,000 Units Subcutaneous Q8H     hydrochlorothiazide  12.5 mg Oral or Feeding Tube Daily     insulin aspart  1-7 Units Subcutaneous TID AC     insulin aspart  1-5 Units Subcutaneous At Bedtime     losartan  50 mg Oral or Feeding Tube Daily     metoprolol succinate ER  50 mg Oral Daily     oxyCODONE  15 mg Oral Q6H     polyethylene glycol  17 g Oral BID     senna-docusate  3 tablet Oral BID     sodium chloride (PF)  10 mL Intracatheter Q7 Days     Data     Laboratory:  Recent Labs   Lab 02/15/19  0634 02/14/19  0716 02/13/19  0818   WBC 12.7* 11.3* 13.6*   HGB 10.3* 10.0* 9.8*   HCT 34.0* 33.7* 32.4*   MCV 87 88 86   * 541* 607*     Recent Labs   Lab 02/16/19  0621 02/14/19  0716 02/13/19  0818    138 138   POTASSIUM 4.2 4.1 3.8   CHLORIDE 97 97 98   CO2 39* 36* 33*   ANIONGAP 3 5 7   GLC 91 106* 135*   BUN 23 21 21   CR 0.84 0.76 0.78   GFRESTIMATED 78 87 85   GFRESTBLACK 90 >90 >90   MARY 8.6 8.4* 8.1*     No results for  input(s): CULT in the last 168 hours.    Imaging:  No results found for this or any previous visit (from the past 24 hour(s)).      Isaak Henao DO MPH  [facility] Hospitalist  [address] [place] [address]  Pager: [phone]  02/17/2019

## 2019-02-18 LAB
GLUCOSE BLDC GLUCOMTR-MCNC: 101 MG/DL (ref 70–99)
GLUCOSE BLDC GLUCOMTR-MCNC: 104 MG/DL (ref 70–99)
GLUCOSE BLDC GLUCOMTR-MCNC: 123 MG/DL (ref 70–99)
GLUCOSE BLDC GLUCOMTR-MCNC: 83 MG/DL (ref 70–99)
GLUCOSE BLDC GLUCOMTR-MCNC: 98 MG/DL (ref 70–99)
PLATELET # BLD AUTO: 464 10E9/L (ref 150–450)

## 2019-02-18 PROCEDURE — 12000000 ZZH R&B MED SURG/OB

## 2019-02-18 PROCEDURE — 36415 COLL VENOUS BLD VENIPUNCTURE: CPT | Performed by: HOSPITALIST

## 2019-02-18 PROCEDURE — 25000128 H RX IP 250 OP 636: Performed by: SURGERY

## 2019-02-18 PROCEDURE — 00000146 ZZHCL STATISTIC GLUCOSE BY METER IP

## 2019-02-18 PROCEDURE — 85049 AUTOMATED PLATELET COUNT: CPT | Performed by: HOSPITALIST

## 2019-02-18 PROCEDURE — 99232 SBSQ HOSP IP/OBS MODERATE 35: CPT | Performed by: INTERNAL MEDICINE

## 2019-02-18 PROCEDURE — 25000132 ZZH RX MED GY IP 250 OP 250 PS 637: Performed by: INTERNAL MEDICINE

## 2019-02-18 RX ADMIN — OXYCODONE HYDROCHLORIDE 15 MG: 5 TABLET ORAL at 12:24

## 2019-02-18 RX ADMIN — GABAPENTIN 600 MG: 600 TABLET, FILM COATED ORAL at 21:31

## 2019-02-18 RX ADMIN — BACLOFEN 10 MG: 10 TABLET ORAL at 21:31

## 2019-02-18 RX ADMIN — GABAPENTIN 600 MG: 600 TABLET, FILM COATED ORAL at 09:45

## 2019-02-18 RX ADMIN — GABAPENTIN 600 MG: 600 TABLET, FILM COATED ORAL at 17:11

## 2019-02-18 RX ADMIN — ASPIRIN 81 MG 162 MG: 81 TABLET ORAL at 09:45

## 2019-02-18 RX ADMIN — METOPROLOL SUCCINATE 50 MG: 50 TABLET, EXTENDED RELEASE ORAL at 09:45

## 2019-02-18 RX ADMIN — BACLOFEN 10 MG: 10 TABLET ORAL at 09:45

## 2019-02-18 RX ADMIN — ATORVASTATIN CALCIUM 10 MG: 10 TABLET, FILM COATED ORAL at 09:45

## 2019-02-18 RX ADMIN — DULOXETINE HYDROCHLORIDE 60 MG: 30 CAPSULE, DELAYED RELEASE ORAL at 21:31

## 2019-02-18 RX ADMIN — AMITRIPTYLINE HYDROCHLORIDE 100 MG: 50 TABLET, FILM COATED ORAL at 21:31

## 2019-02-18 RX ADMIN — DULOXETINE HYDROCHLORIDE 60 MG: 30 CAPSULE, DELAYED RELEASE ORAL at 09:45

## 2019-02-18 RX ADMIN — SALINE NASAL SPRAY 1 SPRAY: 1.5 SOLUTION NASAL at 09:44

## 2019-02-18 RX ADMIN — BACLOFEN 10 MG: 10 TABLET ORAL at 17:11

## 2019-02-18 RX ADMIN — HEPARIN SODIUM 5000 UNITS: 5000 INJECTION, SOLUTION INTRAVENOUS; SUBCUTANEOUS at 17:11

## 2019-02-18 RX ADMIN — OXYCODONE HYDROCHLORIDE 15 MG: 5 TABLET ORAL at 18:06

## 2019-02-18 RX ADMIN — HEPARIN SODIUM 5000 UNITS: 5000 INJECTION, SOLUTION INTRAVENOUS; SUBCUTANEOUS at 09:46

## 2019-02-18 RX ADMIN — OXYCODONE HYDROCHLORIDE 15 MG: 5 TABLET ORAL at 06:03

## 2019-02-18 RX ADMIN — LOSARTAN POTASSIUM 50 MG: 50 TABLET, FILM COATED ORAL at 09:45

## 2019-02-18 ASSESSMENT — ACTIVITIES OF DAILY LIVING (ADL)
ADLS_ACUITY_SCORE: 25

## 2019-02-18 ASSESSMENT — MIFFLIN-ST. JEOR: SCORE: 1712.99

## 2019-02-18 NOTE — PROGRESS NOTES
D:  Per record review, pt's discharge recommendation is TCU.  Referrals have been sent, but pt has not been accepted.    I:  Sw met with the pt to identify additional facilities to send referrals to.  Sw provided the pt with a list of facilities that accept her insurance.  Pt and her  were going to discuss the options and let the sw know which ones to send the referrals to.    A/P: Sw will continue with discharge planning and will be available as needed until discharge.  Estephania will follow-up with the pt tomorrow morning.

## 2019-02-18 NOTE — PROGRESS NOTES
CLINICAL NUTRITION SERVICES - REASSESSMENT NOTE      MALNUTRITION (2/18/2019)  % Weight Loss: Does not meet criteria - some wt loss but likely fluid related  % Intake: No decreased intake noted --> now improved  Subcutaneous Fat Loss: None observed   Muscle Loss: Mild temporal scooping; clavicle, scapular region: mild-moderate depletion - baseline LBM masked by adiposity  Fluid Retention: none     Malnutrition Diagnosis: patient does not meet malnutrition criteria during admit       EVALUATION OF PROGRESS TOWARD GOALS   Diet: Regular    Intake/Tolerance:  100% meal consumption since last RD reassessment.  Consistently ordering meals TID except for 1 instance.  Patient herself reports good appetite.  No nutrition concerns or questions at this time.      ASSESSED NUTRITION NEEDS (PER APPROVED PRACTICE GUIDELINES, Dosing weight: 69.2 kg - adjusted weight  Estimated Energy Needs: 1364-0070 kcals (25-30 Kcal/Kg)  Justification: maintenance with obesity  Estimated Protein Needs: 104-138 grams protein (1.5-2.0 g pro/Kg)  Justification: hypercatabolism with critical illness  Estimated Fluid Needs: per MD  Justification: per MD      NEW FINDINGS:   - WOCN following for healed fungal infection.  Also with improved blanchable skin to BL buttocks.   - Discharge pending placement.    - Labs and meds reviewed.  - Wt trending reviewed.  Had been diuresed:  Vitals:    02/13/19 0527 02/14/19 0654 02/16/19 0628 02/17/19 1522   Weight: 114.8 kg (253 lb 1.6 oz) 112.5 kg (248 lb) 112.5 kg (247 lb 15.9 oz) 113.4 kg (250 lb)    02/18/19 0500   Weight: 113.3 kg (249 lb 12.5 oz)   - Stooling patterns reviewed.     Previous Goals:   Patient to consume >/=75% of meals TID  Evaluation: Met    Previous Nutrition Diagnosis:   Predicted inadequate nutrient intake (protein energy) related to prolonged hospitalization, s/p extubation  Evaluation: Completed, changed below       CURRENT NUTRITION DIAGNOSIS  No nutrition diagnosis identified at this  time    INTERVENTIONS  Recommendations / Nutrition Prescription  Continue regular diet order.     Implementation  Collaboration and Referral of Nutrition care: discussed POC with team during rounds.      MONITORING AND EVALUATION:  Progress towards goals will be monitored and evaluated per protocol and Practice Guidelines      Saba Anderson RD, LD  Clinical Dietitian  3rd floor/ICU: [phone]  All other floors: [phone]  Weekend/holiday: [phone]

## 2019-02-18 NOTE — PLAN OF CARE
Pt is a/o, up with A2 and lift. VSS. Remains on 2L nasal cannula which is not baseline. Generalized mild edema. Blood sugar was 123.  Pain is controlled with scheduled oxycodone. Coccyx has blanchable redness, mepilex applied. Plan is to discharge to TCU once placement is found.

## 2019-02-18 NOTE — PLAN OF CARE
VS stable. A & O x 4.   Diet: regular.   Ambulates:mechanical lift, patient is wheelchair bound at baseline. Q2-3 hour turns as tolerated.   IV meds: PICC in place.   B  Pain:6/10. Patient has baseline about 5/10 lower back pain. Scheduled oxycodone given.   Abnormal assessments: wounds in bilateral groin, wound care done, small spot of blanchable redness was found behind right ear where oxygen tubing sits. Placed foam ear guards on tubing. Will continue to monitor and placed LDA. Redness on bottom. Small scabbing spot on left calf. Lungs coarse. Non productive occasional cough.    Intake & output: Incontinent of stool at times. 1 continent bowel movement this shift  Drains/devices :Bobo catheter in place. Was able to wean down to 2 liters of oxygen nasal cannula..   Discharge plan: medically stable per MD note, waiting for placement. Will continue to monitor and review plan of care.        *Addendum 2140: Checked behind right ear and red spot was gone, so did not place WOC consult. Will continue to monitor.

## 2019-02-18 NOTE — PROGRESS NOTES
[facility]  Hospitalist Progress Note  Jarvis Rutherford MD 02/18/19    Reason for Stay (Diagnosis): Sepsis, UTI, nephrolithiasis respiratory failure         Assessment and Plan:      Summary of Stay:   Amanda Richardson is a 53 year old female with past medical history significant for advanced MS, diabetes mellitus, hypertension, history of T-cell lymphoma status post treatment, chronically elevated WBC count, history of recurrent UTIs who presented with altered mental status increased weakness and somnolence.  Evaluation in the emergency room showed significant leukocytosis, hypotension, acute renal failure, obstructing left kidney stone with moderate hydronephrosis and respiratory failure with acidosis with hypercapnia.  She was admitted was admitted on 1/30/2019 with septic shock.  Patient received IV antibiotics and fluid resuscitation in the emergency room and underwent emergent cystoscopy and left retrograde pyelogram with stent placement on 1/30/2019.  CT also showed closed fracture of right femur.  She was initially placed on BiPAP for respiratory failure but perioperatively was intubated. she was admitted to ICU for further evaluation.     1/31/2019 patient developed wide-complex tachycardia appeared to be SVT failed to respond to vasovagal maneuvers.  Adenosine was given which showed rhythm to be atrial flutter she received cardioversion and was started on amiodarone.  Angiotensin II drip was initiated briefly for hypotension.  She is now off pressors as well as sedative meds.      Continues to improve. Pt extubated successfully on 2/8. Now able to eat per SLP. Pt interested in PT and OT.   She was transferred to the medical floor on 2/10/19.  Ready for discharge when TCU has been found.  Remove PICC line today.  Consider Bobo removal tomorrow.  Diuretics now held as bicarb is going up and minimal edema remaining.    Problem List/Assessment and Plan:   Septic shock likely due to urinary  tract source: (altered mental status, acute renal failure, hypotension, leukocytosis, acidosis, respiratory failure).  She received aggressive fluid resuscitation along with initiation of pressors prior to being taken to the operating room.  She now is stable and shock is fully resolved.  --CT scan on admission showed obstructing stone with hydronephrosis and acute renal failure  --Status post cystoscopy and stent placement 1/30/2019.  Per report patient did have purulent drainage noted during cystoscopy.  --started zosyn to cover for E. Coli isolate, then narrowed to ceftriaxone based on sensitivities.  Repleted 14-day course on 2/13.  --Blood and urine cultures were positive for E coli     Acute hypercapnic respiratory failure:  Still with some hypoxia likely related to volume overload from resolved sepsis and associated necessary management with severe deconditioning and atelectasis.  --Likely multifactorial from sepsis and chronic narcotic use  --Was intubated perioperatively and remained intubated following the procedure until 2/8  --Has been diuresed.  Serum bicarbonate going up, likely secondary to diuresis.  Obtain VBG tomorrow to make sure not developing hypercapnia again along with serum bicarb and holding diuretics     Acute renal failure with left hydronephrosis: Resolved and now at baseline  --Secondary to obstructive uropathy.  Creatinine on admission 2.25, now with creatinine 0.6 -0.7  --Status post a ureteral stent placement; anticipate outpatient follow-up for definitive treatment of the stone and removal of the stent.  --Remove Bobo tomorrow if urine output slows with holding diuretics and creatinine stable.  No previous history of urinary retention     New Atrial flutter provoked by urosepsis.  Stable without recurrence.  --Status post DC cardioversion 1/31/2019  --Appreciate cardiology consult  --Cardiac echo when compared to prior study showed minimal deterioration of left ventricular systolic  "function.  EF 50-55%  --No anticoagulation for now per cardiology patient was in atrial flutter for less than 8 hours  --Switched to oral amiodarone for a total of 2 weeks which she has now completed therapy with  --Continue metoprolol XL 50 mg daily     Right femoral neck fracture  --Incidental finding on CT.  Likely present for quite some time.  --Patient has MS and at baseline is able to pivot herself  --at this time, pt notes that the right hip is painful with vigorous movement.  --Orthopedic surgery was consulted.  No immediate intervention recommended at this time, defer to their expertise re: ongoing management/weight bearing etc.     Type 2 diabetes mellitus, \"borderline\"  --At baseline on metformin will hold for now  --Sliding scale insulin     MS with associated chronic pain  --Prior to admission on amitriptyline, baclofen, duloxetine, gabapentin and oxycodone  --PT evaluation     Significant leukocytosis: Improving  --Patient has history of T-cell non-Hodgkin's lymphoma status post treatment with baseline elevated white cell count (mid-teens)     H/o Hypertension  --resumed losartan and hydrochlorothiazide (not quite at pre-hospitalization doses).  Serum bicarbonate going up and 5 L urine output yesterday so will stop HCTZ for now     Bilateral inguinal wounds  --managed with topical wound cares     Constipation: In the setting of scheduled narcotic use.  Continue laxatives, give enema.  Now resolved.    Anemia: Hemoglobin 9-10 range while here.  Normocytic.  Suspect secondary to sepsis and renal failure.  Should improve in time.  No GI blood loss noted.      DVT Prophylaxis: Heparin SQ  Code Status: Full Code  FEN: Regular diet  Lines: Remove PICC today.  Has Bobo in place, consider removal tomorrow if urine output slows  Discharge Dispo: Social work working on finding TCU.  Estimated Disch Date / # of Days until Disch: Medically stable for discharge to TCU once spot has been found        Interval " "History (Subjective):      Assumed care this morning.  She feels like she is doing well.  Made 5 L of urine over the last 24 hours.  Does not feel short of breath.  Tolerating p.o.  Denies pain at this time.                  Physical Exam:      Last Vital Signs:  /58 (BP Location: Other (Comment))   Pulse 76   Temp 97.9  F (36.6  C) (Oral)   Resp 18   Ht 1.626 m (5' 4\")   Wt 113.3 kg (249 lb 12.5 oz)   LMP 12/11/2011   SpO2 93%   BMI 42.87 kg/m        Intake/Output Summary (Last 24 hours) at 2/18/2019 1351  Last data filed at 2/18/2019 1200  Gross per 24 hour   Intake 480 ml   Output 5600 ml   Net -5120 ml       Constitutional: Awake, NAD   Eyes: sclera white   HEENT:   MMM  Respiratory: no respiratory distress, lungs cta bilaterally, no crackles or wheeze  Cardiovascular: RRR.  No murmur   GI: non-tender, not distended, bowel sounds present  Genitourinary: Bobo catheter in place with yellow urine in bag  Skin: Chronic venous stasis changes of lower legs.  Musculoskeletal/extremities: Right arm PICC in place.  Wrinkling of skin with trace bilateral lower extremity edema remaining  Neurologic: A&O   Psychiatric: calm, cooperative          Medications:      All current medications were reviewed with changes reflected in problem list.         Data:      All new lab and imaging data was reviewed.   Labs:  Recent Labs   Lab 02/18/19  0616 02/15/19  0634 02/14/19  0716 02/13/19  0818   WBC  --  12.7* 11.3* 13.6*   HGB  --  10.3* 10.0* 9.8*   HCT  --  34.0* 33.7* 32.4*   MCV  --  87 88 86   * 592* 541* 607*     Recent Labs   Lab 02/18/19  1119 02/18/19  0725 02/18/19  0209 02/17/19  2144 02/17/19  1727  02/16/19  0621  02/14/19  0716  02/13/19  0818  02/12/19  0555   GLC  --   --   --   --   --   --  91  --  106*  --  135*  --  96   BGM 98 83 123* 122* 85   < >  --    < >  --    < >  --    < >  --     < > = values in this interval not displayed.      Imaging:   None today      Jarvis Rutherford, " MD

## 2019-02-18 NOTE — PLAN OF CARE
A&Ox4, lift, regular diet, heart sounds- WNL, lungs- diminished, bowels- active, ramos in place, PICC removed, sat up in chair, scheduled oxycodone for pain, wound care on groin done, coccyx mepalex CDI. VSS- 94% on 4L O2.

## 2019-02-19 LAB
ANION GAP SERPL CALCULATED.3IONS-SCNC: 3 MMOL/L (ref 3–14)
BUN SERPL-MCNC: 21 MG/DL (ref 7–30)
CALCIUM SERPL-MCNC: 8.6 MG/DL (ref 8.5–10.1)
CHLORIDE SERPL-SCNC: 101 MMOL/L (ref 94–109)
CO2 SERPL-SCNC: 35 MMOL/L (ref 20–32)
CREAT SERPL-MCNC: 0.71 MG/DL (ref 0.52–1.04)
ERYTHROCYTE [DISTWIDTH] IN BLOOD BY AUTOMATED COUNT: 18.6 % (ref 10–15)
GFR SERPL CREATININE-BSD FRML MDRD: >90 ML/MIN/{1.73_M2}
GLUCOSE BLDC GLUCOMTR-MCNC: 102 MG/DL (ref 70–99)
GLUCOSE BLDC GLUCOMTR-MCNC: 103 MG/DL (ref 70–99)
GLUCOSE BLDC GLUCOMTR-MCNC: 112 MG/DL (ref 70–99)
GLUCOSE BLDC GLUCOMTR-MCNC: 230 MG/DL (ref 70–99)
GLUCOSE SERPL-MCNC: 91 MG/DL (ref 70–99)
HCT VFR BLD AUTO: 34 % (ref 35–47)
HGB BLD-MCNC: 10.1 G/DL (ref 11.7–15.7)
MCH RBC QN AUTO: 26 PG (ref 26.5–33)
MCHC RBC AUTO-ENTMCNC: 29.7 G/DL (ref 31.5–36.5)
MCV RBC AUTO: 88 FL (ref 78–100)
PLATELET # BLD AUTO: 432 10E9/L (ref 150–450)
POTASSIUM SERPL-SCNC: 4.2 MMOL/L (ref 3.4–5.3)
RBC # BLD AUTO: 3.88 10E12/L (ref 3.8–5.2)
SODIUM SERPL-SCNC: 139 MMOL/L (ref 133–144)
WBC # BLD AUTO: 11.7 10E9/L (ref 4–11)

## 2019-02-19 PROCEDURE — 25000128 H RX IP 250 OP 636: Performed by: SURGERY

## 2019-02-19 PROCEDURE — 00000146 ZZHCL STATISTIC GLUCOSE BY METER IP

## 2019-02-19 PROCEDURE — 80048 BASIC METABOLIC PNL TOTAL CA: CPT | Performed by: INTERNAL MEDICINE

## 2019-02-19 PROCEDURE — 12000000 ZZH R&B MED SURG/OB

## 2019-02-19 PROCEDURE — 36415 COLL VENOUS BLD VENIPUNCTURE: CPT | Performed by: INTERNAL MEDICINE

## 2019-02-19 PROCEDURE — 25000132 ZZH RX MED GY IP 250 OP 250 PS 637: Performed by: INTERNAL MEDICINE

## 2019-02-19 PROCEDURE — 85027 COMPLETE CBC AUTOMATED: CPT | Performed by: INTERNAL MEDICINE

## 2019-02-19 PROCEDURE — 99232 SBSQ HOSP IP/OBS MODERATE 35: CPT | Performed by: INTERNAL MEDICINE

## 2019-02-19 RX ADMIN — DULOXETINE HYDROCHLORIDE 60 MG: 30 CAPSULE, DELAYED RELEASE ORAL at 21:20

## 2019-02-19 RX ADMIN — HEPARIN SODIUM 5000 UNITS: 5000 INJECTION, SOLUTION INTRAVENOUS; SUBCUTANEOUS at 08:38

## 2019-02-19 RX ADMIN — BACLOFEN 10 MG: 10 TABLET ORAL at 08:39

## 2019-02-19 RX ADMIN — BACLOFEN 10 MG: 10 TABLET ORAL at 21:20

## 2019-02-19 RX ADMIN — HEPARIN SODIUM 5000 UNITS: 5000 INJECTION, SOLUTION INTRAVENOUS; SUBCUTANEOUS at 23:56

## 2019-02-19 RX ADMIN — ATORVASTATIN CALCIUM 10 MG: 10 TABLET, FILM COATED ORAL at 08:39

## 2019-02-19 RX ADMIN — AMITRIPTYLINE HYDROCHLORIDE 100 MG: 50 TABLET, FILM COATED ORAL at 21:20

## 2019-02-19 RX ADMIN — OXYCODONE HYDROCHLORIDE 15 MG: 5 TABLET ORAL at 00:42

## 2019-02-19 RX ADMIN — GABAPENTIN 600 MG: 600 TABLET, FILM COATED ORAL at 08:39

## 2019-02-19 RX ADMIN — ASPIRIN 81 MG 162 MG: 81 TABLET ORAL at 08:39

## 2019-02-19 RX ADMIN — METOPROLOL SUCCINATE 50 MG: 50 TABLET, EXTENDED RELEASE ORAL at 08:39

## 2019-02-19 RX ADMIN — GABAPENTIN 600 MG: 600 TABLET, FILM COATED ORAL at 16:15

## 2019-02-19 RX ADMIN — HEPARIN SODIUM 5000 UNITS: 5000 INJECTION, SOLUTION INTRAVENOUS; SUBCUTANEOUS at 00:42

## 2019-02-19 RX ADMIN — DULOXETINE HYDROCHLORIDE 60 MG: 30 CAPSULE, DELAYED RELEASE ORAL at 08:39

## 2019-02-19 RX ADMIN — OXYCODONE HYDROCHLORIDE 15 MG: 5 TABLET ORAL at 11:40

## 2019-02-19 RX ADMIN — BACLOFEN 10 MG: 10 TABLET ORAL at 16:15

## 2019-02-19 RX ADMIN — LOSARTAN POTASSIUM 50 MG: 50 TABLET, FILM COATED ORAL at 08:39

## 2019-02-19 RX ADMIN — OXYCODONE HYDROCHLORIDE 15 MG: 5 TABLET ORAL at 23:56

## 2019-02-19 RX ADMIN — OXYCODONE HYDROCHLORIDE 15 MG: 5 TABLET ORAL at 18:13

## 2019-02-19 RX ADMIN — HEPARIN SODIUM 5000 UNITS: 5000 INJECTION, SOLUTION INTRAVENOUS; SUBCUTANEOUS at 16:14

## 2019-02-19 RX ADMIN — GABAPENTIN 600 MG: 600 TABLET, FILM COATED ORAL at 21:20

## 2019-02-19 RX ADMIN — OXYCODONE HYDROCHLORIDE 15 MG: 5 TABLET ORAL at 06:23

## 2019-02-19 ASSESSMENT — ACTIVITIES OF DAILY LIVING (ADL)
ADLS_ACUITY_SCORE: 27
ADLS_ACUITY_SCORE: 27
ADLS_ACUITY_SCORE: 28
ADLS_ACUITY_SCORE: 27

## 2019-02-19 ASSESSMENT — MIFFLIN-ST. JEOR: SCORE: 1738.94

## 2019-02-19 NOTE — PROGRESS NOTES
D:  Per record review, pt's discharge recommendation is TCU.  Referrals have been sent.    I:  Estephania spoke with Camila [phone] at [facility] in [place] regarding the pt's admission. Camila said that they probably won't have a bed until Thursday.  She said that they still have to do the nurse to nurse in order to accept the pt.  They are still reviewing her clinicals.  Sw spoke to the pt to update her that [facility] in [place] does not have a bed available until Thursday.    Pt identified [facility] and [facility] as additional facilities to send referrals to.  She also requested that it be resent to [facility] to see if they have any beds available    Sw sent the additional referrals.      A/P:  Sw will continue with discharge planning and will be available as needed until discharge.

## 2019-02-19 NOTE — PROGRESS NOTES
[facility] Progress Note  [facility] S[facility] 3    Brief follow up visit with Amanda who is awaiting discharge today.  She is looking forward to seeing her spouse and son.  Spoke briefly about rehab and her interest in sketching.      Plan: [facility] remains available for additional emotional/spiritual support.    Jovany Francois MA  Staff   Pager: [phone]  Phone: [phone]

## 2019-02-19 NOTE — PLAN OF CARE
Pt was up to the chair with the ceiling lift.  VSS, did not need coverage for BS. Has a right femoral fx. Hx of chronic pain. Bobo in place with good urine output. Groin wounds are healed, coccyx with foam dressing in placed. Has venous disease to lower extremities. O2 was decreased to 2 LPM

## 2019-02-19 NOTE — PLAN OF CARE
VSS.  O2 - 92% on 3L.   A/O x 4. Up with lift / ast x 2. Wheel chair bound at baseline.  Bobo cath in place.   Mepilex on coccyx, has blanchable redness.   Lower extremity 2+ edema.   .  LS: diminished

## 2019-02-19 NOTE — PROGRESS NOTES
[facility]  Hospitalist Progress Note  Jacob Chairez MD   Reason for Stay (Diagnosis): Sepsis, UTI, nephrolithiasis respiratory failure         Assessment and Plan:      Summary of Stay:   Amanda Richardson is a 53 year old female with past medical history significant for advanced MS, diabetes mellitus, hypertension, history of T-cell lymphoma status post treatment, chronically elevated WBC count, history of recurrent UTIs who presented with altered mental status increased weakness and somnolence.  Evaluation in the emergency room showed significant leukocytosis, hypotension, acute renal failure, obstructing left kidney stone with moderate hydronephrosis and respiratory failure with acidosis with hypercapnia.  She was admitted was admitted on 1/30/2019 with septic shock.  Patient received IV antibiotics and fluid resuscitation in the emergency room and underwent emergent cystoscopy and left retrograde pyelogram with stent placement on 1/30/2019.  CT also showed closed fracture of right femur.  She was initially placed on BiPAP for respiratory failure but perioperatively was intubated. she was admitted to ICU for further evaluation.     1/31/2019 patient developed wide-complex tachycardia appeared to be SVT failed to respond to vasovagal maneuvers.  Adenosine was given which showed rhythm to be atrial flutter she received cardioversion and was started on amiodarone.  Angiotensin II drip was initiated briefly for hypotension.  She is now off pressors as well as sedative meds.      Continues to improve. Pt extubated successfully on 2/8. Now able to eat per SLP. Pt interested in PT and OT.   She was transferred to the medical floor on 2/10/19.  Ready for discharge when TCU has been found.  Remove PICC line today.  Consider Bobo removal tomorrow.  Diuretics now held as bicarb is going up and minimal edema remaining.    Problem List/Assessment and Plan:   1. Septic shock likely due to urinary  tract source: (altered mental status, acute renal failure, hypotension, leukocytosis, acidosis, respiratory failure).  She received aggressive fluid resuscitation along with initiation of pressors prior to being taken to the operating room.  She now is stable and shock is fully resolved.  --CT scan on admission showed obstructing stone with hydronephrosis and acute renal failure  --Status post cystoscopy and stent placement 1/30/2019.  Per report patient did have purulent drainage noted during cystoscopy.  --started zosyn to cover for E. Coli isolate, then narrowed to ceftriaxone based on sensitivities. Completed 14-day course on 2/13.  --Blood and urine cultures were positive for E coli     2. Acute hypercapnic respiratory failure:  Still with some hypoxia likely related to volume overload from resolved sepsis and associated necessary management with severe deconditioning and atelectasis.  --Likely multifactorial from sepsis and chronic narcotic use  --Was intubated perioperatively and remained intubated following the procedure until 2/8  --Has been diuresed.  Serum bicarbonate going up, likely secondary to diuresis.  Obtain VBG tomorrow to make sure not developing hypercapnia again along with serum bicarb and holding diuretics     3. Acute renal failure with left hydronephrosis: Resolved and now at baseline  --Secondary to obstructive uropathy.  Creatinine on admission 2.25, now with creatinine 0.6 -0.7  --Status post a ureteral stent placement; anticipate outpatient follow-up for definitive treatment of the stone and removal of the stent.  --Discontinue Bobo catheter today, order placed     4. New Atrial flutter provoked by urosepsis.  Stable without recurrence.  --Status post DC cardioversion 1/31/2019  --Appreciate cardiology consult  --Cardiac echo when compared to prior study showed minimal deterioration of left ventricular systolic function.  EF 50-55%  --No anticoagulation for now per cardiology patient  "was in atrial flutter for less than 8 hours  --Switched to oral amiodarone for a total of 2 weeks which she has now completed therapy with  --Continue metoprolol XL 50 mg daily     5. Right femoral neck fracture  --Incidental finding on CT.  Likely present for quite some time.  --Patient has MS and at baseline is able to pivot herself  --at this time, pt notes that the right hip is painful with vigorous movement.  --Orthopedic surgery was consulted.  No immediate intervention recommended at this time, defer to their expertise re: ongoing management/weight bearing etc.     6. Type 2 diabetes mellitus, \"borderline\"  --At baseline on metformin will hold for now  --Sliding scale insulin     7. MS with associated chronic pain  --Prior to admission on amitriptyline, baclofen, duloxetine, gabapentin and oxycodone  --PT evaluation     8. Significant leukocytosis: Improving  --Patient has history of T-cell non-Hodgkin's lymphoma status post treatment with baseline elevated white cell count (mid-teens)     9. H/o Hypertension  --resumed losartan and hydrochlorothiazide (not quite at pre-hospitalization doses).  Serum bicarbonate going up and 5 L urine output yesterday so will stop HCTZ for now     10. Bilateral inguinal wounds  --managed with topical wound cares     11. Constipation: In the setting of scheduled narcotic use.  Continue laxatives, give enema.  Now resolved.    12. Anemia: Hemoglobin 9-10 range while here.  Normocytic.  Suspect secondary to sepsis and renal failure.  Should improve in time.  No GI blood loss noted.      DVT Prophylaxis: Heparin SQ  Code Status: Full Code  FEN: Regular diet  Lines: Remove PICC today.  Has Bobo in place, consider removal tomorrow if urine output slows  Discharge Dispo: Social work working on finding TCU.  Estimated Disch Date / # of Days until Disch: Medically stable for discharge to TCU once spot has been found  Discussed with patient at length, awaiting placement and will be " "discharged      Interval History (Subjective):      Patient seen and examined, assumed care today, no new overnight issues, feels better, urine output is okay, remove Bobo catheter.  Denies pain at this time.                  Physical Exam:      Last Vital Signs:  /47 (BP Location: Left arm)   Pulse 76   Temp 98.5  F (36.9  C) (Oral)   Resp 18   Ht 1.626 m (5' 4\")   Wt 115.9 kg (255 lb 8 oz)   LMP 12/11/2011   SpO2 94%   BMI 43.86 kg/m        Intake/Output Summary (Last 24 hours) reviewed    Constitutional: Awake, NAD   Eyes: sclera white   HEENT:   MMM  Respiratory: no respiratory distress, lungs cta bilaterally, no crackles or wheeze  Cardiovascular: RRR.  No murmur   GI: non-tender, not distended, bowel sounds present  Genitourinary: Bobo catheter in place with yellow urine in bag  Skin: Chronic venous stasis changes of lower legs.  Musculoskeletal/extremities: Right arm PICC in place.  Wrinkling of skin with trace bilateral lower extremity edema remaining  Neurologic: A&O   Psychiatric: calm, cooperative          Medications:        Current Facility-Administered Medications   Medication     acetaminophen (TYLENOL) tablet 650 mg     amitriptyline (ELAVIL) tablet 100 mg     aspirin (ASA) chewable tablet 162 mg     atorvastatin (LIPITOR) tablet 10 mg     baclofen (LIORESAL) tablet 10 mg     bisacodyl (DULCOLAX) Suppository 10 mg     bisacodyl (DULCOLAX) Suppository 10 mg     glucose gel 15-30 g    Or     dextrose 50 % injection 25-50 mL    Or     glucagon injection 1 mg     docusate sodium (COLACE) capsule 100 mg     DULoxetine (CYMBALTA) EC capsule 60 mg     gabapentin (NEURONTIN) tablet 600 mg     heparin lock flush 10 UNIT/ML injection 5-10 mL     heparin sodium injection 5,000 Units     insulin aspart (NovoLOG) inj (RAPID ACTING)     insulin aspart (NovoLOG) inj (RAPID ACTING)     ipratropium - albuterol 0.5 mg/2.5 mg/3 mL (DUONEB) neb solution 3 mL     lidocaine 1 % 0.5-5 mL     lidocaine 1 % 1 " mL     losartan (COZAAR) tablet 50 mg     magnesium sulfate 2 g in NS intermittent infusion (PharMEDium or FV Cmpd)     magnesium sulfate 4 g in 100 mL sterile water (premade)     metoprolol succinate ER (TOPROL-XL) 24 hr tablet 50 mg     naloxone (NARCAN) injection 0.1-0.4 mg     ondansetron (ZOFRAN-ODT) ODT tab 4 mg    Or     ondansetron (ZOFRAN) injection 4 mg     oxyCODONE (ROXICODONE) tablet 15 mg     pink lady enema (COMPOUNDED: docusate, magnesium citrate, mineral oil, sodium phosphate)     polyethylene glycol (MIRALAX/GLYCOLAX) Packet 17 g     polyethylene glycol (MIRALAX/GLYCOLAX) Packet 17 g     potassium chloride (KLOR-CON) Packet 20-40 mEq     potassium chloride 10 mEq in 100 mL intermittent infusion with 10 mg lidocaine     potassium chloride 10 mEq in 100 mL sterile water intermittent infusion (premix)     potassium chloride 20 mEq in 50 mL intermittent infusion     potassium chloride ER (K-DUR/KLOR-CON M) CR tablet 20-40 mEq     potassium phosphate 15 mmol in D5W 250 mL intermittent infusion     potassium phosphate 20 mmol in D5W 250 mL intermittent infusion     potassium phosphate 20 mmol in D5W 500 mL intermittent infusion     potassium phosphate 25 mmol in D5W 500 mL intermittent infusion     senna-docusate (SENOKOT-S/PERICOLACE) 8.6-50 MG per tablet 3 tablet     sodium chloride (OCEAN) 0.65 % nasal spray 1-2 spray     sodium chloride (PF) 0.9% PF flush 10-20 mL     sodium chloride (PF) 0.9% PF flush 10-20 mL     sodium chloride (PF) 0.9% PF flush 3 mL          Data:      All new lab and imaging data was reviewed.   Labs:  Recent Labs   Lab 02/19/19  0631 02/18/19  0616 02/15/19  0634 02/14/19  0716   WBC 11.7*  --  12.7* 11.3*   HGB 10.1*  --  10.3* 10.0*   HCT 34.0*  --  34.0* 33.7*   MCV 88  --  87 88    464* 592* 541*     Recent Labs   Lab 02/19/19  1136 02/19/19  0631 02/19/19  0154 02/18/19  2103 02/18/19  1710 02/18/19  1119  02/16/19  0621  02/14/19  0716  02/13/19  0818   GLC  --   91  --   --   --   --   --  91  --  106*  --  135*   *  --  102* 104* 101* 98   < >  --    < >  --    < >  --     < > = values in this interval not displayed.      Imaging:   None today      Jacob Chairez MD

## 2019-02-20 LAB
GLUCOSE BLDC GLUCOMTR-MCNC: 104 MG/DL (ref 70–99)
GLUCOSE BLDC GLUCOMTR-MCNC: 107 MG/DL (ref 70–99)
GLUCOSE BLDC GLUCOMTR-MCNC: 119 MG/DL (ref 70–99)
GLUCOSE BLDC GLUCOMTR-MCNC: 81 MG/DL (ref 70–99)
GLUCOSE BLDC GLUCOMTR-MCNC: 97 MG/DL (ref 70–99)

## 2019-02-20 PROCEDURE — 25000132 ZZH RX MED GY IP 250 OP 250 PS 637: Performed by: INTERNAL MEDICINE

## 2019-02-20 PROCEDURE — 12000000 ZZH R&B MED SURG/OB

## 2019-02-20 PROCEDURE — 00000146 ZZHCL STATISTIC GLUCOSE BY METER IP

## 2019-02-20 PROCEDURE — 99232 SBSQ HOSP IP/OBS MODERATE 35: CPT | Performed by: INTERNAL MEDICINE

## 2019-02-20 PROCEDURE — 25000128 H RX IP 250 OP 636: Performed by: SURGERY

## 2019-02-20 RX ADMIN — HEPARIN SODIUM 5000 UNITS: 5000 INJECTION, SOLUTION INTRAVENOUS; SUBCUTANEOUS at 07:57

## 2019-02-20 RX ADMIN — ASPIRIN 81 MG 162 MG: 81 TABLET ORAL at 08:07

## 2019-02-20 RX ADMIN — GABAPENTIN 600 MG: 600 TABLET, FILM COATED ORAL at 07:55

## 2019-02-20 RX ADMIN — OXYCODONE HYDROCHLORIDE 15 MG: 5 TABLET ORAL at 05:07

## 2019-02-20 RX ADMIN — DULOXETINE HYDROCHLORIDE 60 MG: 30 CAPSULE, DELAYED RELEASE ORAL at 21:25

## 2019-02-20 RX ADMIN — OXYCODONE HYDROCHLORIDE 15 MG: 5 TABLET ORAL at 11:58

## 2019-02-20 RX ADMIN — POLYETHYLENE GLYCOL 3350 17 G: 17 POWDER, FOR SOLUTION ORAL at 07:57

## 2019-02-20 RX ADMIN — GABAPENTIN 600 MG: 600 TABLET, FILM COATED ORAL at 21:26

## 2019-02-20 RX ADMIN — GABAPENTIN 600 MG: 600 TABLET, FILM COATED ORAL at 15:49

## 2019-02-20 RX ADMIN — BACLOFEN 10 MG: 10 TABLET ORAL at 07:56

## 2019-02-20 RX ADMIN — OXYCODONE HYDROCHLORIDE 15 MG: 5 TABLET ORAL at 17:48

## 2019-02-20 RX ADMIN — BACLOFEN 10 MG: 10 TABLET ORAL at 15:49

## 2019-02-20 RX ADMIN — METOPROLOL SUCCINATE 50 MG: 50 TABLET, EXTENDED RELEASE ORAL at 07:57

## 2019-02-20 RX ADMIN — HEPARIN SODIUM 5000 UNITS: 5000 INJECTION, SOLUTION INTRAVENOUS; SUBCUTANEOUS at 23:30

## 2019-02-20 RX ADMIN — AMITRIPTYLINE HYDROCHLORIDE 100 MG: 50 TABLET, FILM COATED ORAL at 21:26

## 2019-02-20 RX ADMIN — BACLOFEN 10 MG: 10 TABLET ORAL at 21:26

## 2019-02-20 RX ADMIN — HEPARIN SODIUM 5000 UNITS: 5000 INJECTION, SOLUTION INTRAVENOUS; SUBCUTANEOUS at 15:48

## 2019-02-20 RX ADMIN — DULOXETINE HYDROCHLORIDE 60 MG: 30 CAPSULE, DELAYED RELEASE ORAL at 07:56

## 2019-02-20 RX ADMIN — OXYCODONE HYDROCHLORIDE 15 MG: 5 TABLET ORAL at 23:29

## 2019-02-20 RX ADMIN — LOSARTAN POTASSIUM 50 MG: 50 TABLET, FILM COATED ORAL at 07:56

## 2019-02-20 RX ADMIN — ATORVASTATIN CALCIUM 10 MG: 10 TABLET, FILM COATED ORAL at 07:57

## 2019-02-20 ASSESSMENT — ACTIVITIES OF DAILY LIVING (ADL)
ADLS_ACUITY_SCORE: 31
ADLS_ACUITY_SCORE: 29
ADLS_ACUITY_SCORE: 31
ADLS_ACUITY_SCORE: 27

## 2019-02-20 ASSESSMENT — MIFFLIN-ST. JEOR: SCORE: 1733.95

## 2019-02-20 NOTE — PLAN OF CARE
VS stable. A & O x 4.   Diet: regular.   Ambulates:mechanical lift, patient is wheelchair bound at baseline. Q2-3 hour turns as tolerated with patient sitting in the chair for lunch   IV meds: no IV in place since yesterday, per report MD is aware  B, 97  Pain:6/10. Patient has baseline about 5/10 lower back pain. Scheduled oxycodone given.   Abnormal assessments: wounds in bilateral groin, wound care done,Bottom has some redness. Foam removed after bowel movement  Intake & output: 1 continent bowel movement this shift  Drains/devices : purewick in place, new wick put in   Discharge plan: medically stable per MD note, waiting for placement. Will continue to monitor and review plan of care.

## 2019-02-20 NOTE — PLAN OF CARE
Pt has been up in the chair. Has been voiding and was incontinent of urine x1. Pt is awaiting placement to a TCU.

## 2019-02-20 NOTE — PROGRESS NOTES
[facility]  Hospitalist Progress Note  Jacob Chairez MD   Reason for Stay (Diagnosis): Sepsis, UTI, nephrolithiasis respiratory failure         Assessment and Plan:      Summary of Stay:   Amanda Richardson is a 53 year old female with past medical history significant for advanced MS, diabetes mellitus, hypertension, history of T-cell lymphoma status post treatment, chronically elevated WBC count, history of recurrent UTIs who presented with altered mental status increased weakness and somnolence.  Evaluation in the emergency room showed significant leukocytosis, hypotension, acute renal failure, obstructing left kidney stone with moderate hydronephrosis and respiratory failure with acidosis with hypercapnia.  She was admitted was admitted on 1/30/2019 with septic shock.  Patient received IV antibiotics and fluid resuscitation in the emergency room and underwent emergent cystoscopy and left retrograde pyelogram with stent placement on 1/30/2019.  CT also showed closed fracture of right femur.  She was initially placed on BiPAP for respiratory failure but perioperatively was intubated. she was admitted to ICU for further evaluation.     1/31/2019 patient developed wide-complex tachycardia appeared to be SVT failed to respond to vasovagal maneuvers.  Adenosine was given which showed rhythm to be atrial flutter she received cardioversion and was started on amiodarone.  Angiotensin II drip was initiated briefly for hypotension.  She is now off pressors as well as sedative meds.      Continues to improve. Pt extubated successfully on 2/8. Now able to eat per SLP. Pt interested in PT and OT.   She was transferred to the medical floor on 2/10/19.  Ready for discharge when TCU has been found.  Remove PICC line today.  Consider Bobo removal tomorrow.  Diuretics now held as bicarb is going up and minimal edema remaining.    Problem List/Assessment and Plan:   1. Septic shock likely due to urinary  tract source: (altered mental status, acute renal failure, hypotension, leukocytosis, acidosis, respiratory failure).  She received aggressive fluid resuscitation along with initiation of pressors prior to being taken to the operating room.  She now is stable and shock is fully resolved.  --CT scan on admission showed obstructing stone with hydronephrosis and acute renal failure  --Status post cystoscopy and stent placement 1/30/2019.  Per report patient did have purulent drainage noted during cystoscopy.  --started zosyn to cover for E. Coli isolate, then narrowed to ceftriaxone based on sensitivities. Completed 14-day course on 2/13.  --Blood and urine cultures were positive for E coli     2. Acute hypercapnic respiratory failure:  Still with some hypoxia likely related to volume overload from resolved sepsis and associated necessary management with severe deconditioning and atelectasis.  --Likely multifactorial from sepsis and chronic narcotic use  --Was intubated perioperatively and remained intubated following the procedure until 2/8  --Has been diuresed.  Serum bicarbonate going up, likely secondary to diuresis.  Obtain VBG tomorrow to make sure not developing hypercapnia again along with serum bicarb and holding diuretics     3. Acute renal failure with left hydronephrosis: Resolved and now at baseline  --Secondary to obstructive uropathy.  Creatinine on admission 2.25, now with creatinine 0.6 -0.7  --Status post a ureteral stent placement; anticipate outpatient follow-up for definitive treatment of the stone and removal of the stent.  --Discontinue Bobo catheter today, order placed     4. New Atrial flutter provoked by urosepsis.  Stable without recurrence.  -Status post DC cardioversion 1/31/2019  -Appreciate cardiology consult  -Cardiac echo when compared to prior study showed minimal deterioration of left ventricular systolic function.  EF 50-55%  -No anticoagulation for now per cardiology patient was in  "atrial flutter for less than 8 hours  -Switched to oral amiodarone for a total of 2 weeks which she has now completed therapy with  -Continue metoprolol XL 50 mg daily     5. Right femoral neck fracture  -Incidental finding on CT.  Likely present for quite some time.  -Patient has MS and at baseline is able to pivot herself  -At this time, pt notes that the right hip is painful with vigorous movement.  -Orthopedic surgery was consulted.  No immediate intervention recommended at this time, defer to their expertise re: ongoing management/weight bearing etc.     6. Type 2 diabetes mellitus, \"borderline\"  -At baseline on metformin will hold for now  -Sliding scale insulin     7. MS with associated chronic pain  -Prior to admission on amitriptyline, baclofen, duloxetine, gabapentin and oxycodone  -PT evaluation     8. Significant leukocytosis: Improving  -Patient has history of T-cell non-Hodgkin's lymphoma status post treatment with baseline elevated white cell count (mid-teens)     9. H/o Hypertension  -resumed losartan and hydrochlorothiazide (not quite at pre-hospitalization doses).  Serum bicarbonate going up and 5 L urine output yesterday so will stop HCTZ for now     10. Bilateral inguinal wounds  -managed with topical wound cares     11. Constipation: In the setting of scheduled narcotic use.  Continue laxatives, give enema.  Now resolved.    12. Anemia: Hemoglobin 9-10 range while here.  Normocytic.  Suspect secondary to sepsis and renal failure.  Should improve in time.  No GI blood loss noted.      DVT Prophylaxis: Heparin SQ  Code Status: Full Code  FEN: Regular diet  Lines: Remove PICC today.  Has Bobo in place, consider removal tomorrow if urine output slows  Discharge Dispo: Social work working on finding TCU.  Estimated Disch Date / # of Days until Disch:     Patient is medically stable and can be discharged when placement is available for her at TCU.  I discussed with patient the plan of care, she " "understands she is awaiting placement and will be discharged.        Interval History (Subjective):      Patient seen and examined, no new overnight issues, feels better, urine output is okay, remove Bobo catheter. Denies pain at this time.                  Physical Exam:      Last Vital Signs:  /59 (BP Location: Left arm)   Pulse 79   Temp 97.7  F (36.5  C) (Oral)   Resp 16   Ht 1.626 m (5' 4\")   Wt 115.4 kg (254 lb 6.4 oz)   LMP 12/11/2011   SpO2 91%   BMI 43.67 kg/m      Intake/Output Summary (Last 24 hours) reviewed    Constitutional: Awake, NAD.  Eyes: sclera white   HEENT:   MMM  Respiratory: no respiratory distress, lungs cta bilaterally, no crackles or wheeze  Cardiovascular: RRR.  No murmur   GI: non-tender, not distended, bowel sounds present  Genitourinary: Bobo catheter in place with yellow urine in bag  Skin: Chronic venous stasis changes of lower legs.  Musculoskeletal/extremities: Right arm PICC in place.  Wrinkling of skin with trace bilateral lower extremity edema remaining  Neurologic: A&O   Psychiatric: calm, cooperative          Medications:        Current Facility-Administered Medications   Medication     acetaminophen (TYLENOL) tablet 650 mg     amitriptyline (ELAVIL) tablet 100 mg     aspirin (ASA) chewable tablet 162 mg     atorvastatin (LIPITOR) tablet 10 mg     baclofen (LIORESAL) tablet 10 mg     bisacodyl (DULCOLAX) Suppository 10 mg     bisacodyl (DULCOLAX) Suppository 10 mg     glucose gel 15-30 g    Or     dextrose 50 % injection 25-50 mL    Or     glucagon injection 1 mg     docusate sodium (COLACE) capsule 100 mg     DULoxetine (CYMBALTA) EC capsule 60 mg     gabapentin (NEURONTIN) tablet 600 mg     heparin lock flush 10 UNIT/ML injection 5-10 mL     heparin sodium injection 5,000 Units     insulin aspart (NovoLOG) inj (RAPID ACTING)     insulin aspart (NovoLOG) inj (RAPID ACTING)     ipratropium - albuterol 0.5 mg/2.5 mg/3 mL (DUONEB) neb solution 3 mL     lidocaine " 1 % 0.5-5 mL     lidocaine 1 % 1 mL     losartan (COZAAR) tablet 50 mg     magnesium sulfate 2 g in NS intermittent infusion (PharMEDium or FV Cmpd)     magnesium sulfate 4 g in 100 mL sterile water (premade)     metoprolol succinate ER (TOPROL-XL) 24 hr tablet 50 mg     naloxone (NARCAN) injection 0.1-0.4 mg     ondansetron (ZOFRAN-ODT) ODT tab 4 mg    Or     ondansetron (ZOFRAN) injection 4 mg     oxyCODONE (ROXICODONE) tablet 15 mg     pink lady enema (COMPOUNDED: docusate, magnesium citrate, mineral oil, sodium phosphate)     polyethylene glycol (MIRALAX/GLYCOLAX) Packet 17 g     polyethylene glycol (MIRALAX/GLYCOLAX) Packet 17 g     potassium chloride (KLOR-CON) Packet 20-40 mEq     potassium chloride 10 mEq in 100 mL intermittent infusion with 10 mg lidocaine     potassium chloride 10 mEq in 100 mL sterile water intermittent infusion (premix)     potassium chloride 20 mEq in 50 mL intermittent infusion     potassium chloride ER (K-DUR/KLOR-CON M) CR tablet 20-40 mEq     potassium phosphate 15 mmol in D5W 250 mL intermittent infusion     potassium phosphate 20 mmol in D5W 250 mL intermittent infusion     potassium phosphate 20 mmol in D5W 500 mL intermittent infusion     potassium phosphate 25 mmol in D5W 500 mL intermittent infusion     senna-docusate (SENOKOT-S/PERICOLACE) 8.6-50 MG per tablet 3 tablet     sodium chloride (OCEAN) 0.65 % nasal spray 1-2 spray     sodium chloride (PF) 0.9% PF flush 10-20 mL     sodium chloride (PF) 0.9% PF flush 10-20 mL     sodium chloride (PF) 0.9% PF flush 3 mL          Data:      All new lab and imaging data was reviewed.   Labs:  Recent Labs   Lab 02/19/19  0631 02/18/19  0616 02/15/19  0634 02/14/19  0716   WBC 11.7*  --  12.7* 11.3*   HGB 10.1*  --  10.3* 10.0*   HCT 34.0*  --  34.0* 33.7*   MCV 88  --  87 88    464* 592* 541*     Recent Labs   Lab 02/20/19  1152 02/20/19  0718 02/20/19  0147 02/19/19  2112 02/19/19  1657  02/19/19  0631  02/16/19  0621   02/14/19  0716   GLC  --   --   --   --   --   --  91  --  91  --  106*   BGM 97 81 107* 112* 103*   < >  --    < >  --    < >  --     < > = values in this interval not displayed.      Imaging:   None today      Jacob Chairez MD

## 2019-02-20 NOTE — PROGRESS NOTES
TONY     D: Discharge planning continuing... call received from pt's  asking that referral be made also to [facility], referral made. Call received from [facility] informing that they have no openings there.         Addendum:        D: Call received from Josse ( [phone], fax [phone]) at [facility] ( swing bed/TCU) who asked that additional clinical information be faxed for assessment continuation. Information faxed as requested.        Addendum:        D: [facility] has assessed and clinically would be able to accept pt, they are pursuing [employer] authorization at this time. SW has spoken to pt and her  regarding above, they would like that SW check in with [facility] tomorrow to see about opening, based on the location to their home.

## 2019-02-21 LAB
GLUCOSE BLDC GLUCOMTR-MCNC: 102 MG/DL (ref 70–99)
GLUCOSE BLDC GLUCOMTR-MCNC: 106 MG/DL (ref 70–99)
GLUCOSE BLDC GLUCOMTR-MCNC: 172 MG/DL (ref 70–99)
GLUCOSE BLDC GLUCOMTR-MCNC: 85 MG/DL (ref 70–99)
GLUCOSE BLDC GLUCOMTR-MCNC: 86 MG/DL (ref 70–99)
PLATELET # BLD AUTO: 376 10E9/L (ref 150–450)

## 2019-02-21 PROCEDURE — 99232 SBSQ HOSP IP/OBS MODERATE 35: CPT | Performed by: INTERNAL MEDICINE

## 2019-02-21 PROCEDURE — 85049 AUTOMATED PLATELET COUNT: CPT | Performed by: HOSPITALIST

## 2019-02-21 PROCEDURE — 36415 COLL VENOUS BLD VENIPUNCTURE: CPT | Performed by: HOSPITALIST

## 2019-02-21 PROCEDURE — 12000000 ZZH R&B MED SURG/OB

## 2019-02-21 PROCEDURE — 25000128 H RX IP 250 OP 636: Performed by: SURGERY

## 2019-02-21 PROCEDURE — 00000146 ZZHCL STATISTIC GLUCOSE BY METER IP

## 2019-02-21 PROCEDURE — 25000132 ZZH RX MED GY IP 250 OP 250 PS 637: Performed by: INTERNAL MEDICINE

## 2019-02-21 RX ADMIN — ATORVASTATIN CALCIUM 10 MG: 10 TABLET, FILM COATED ORAL at 07:47

## 2019-02-21 RX ADMIN — GABAPENTIN 600 MG: 600 TABLET, FILM COATED ORAL at 15:56

## 2019-02-21 RX ADMIN — AMITRIPTYLINE HYDROCHLORIDE 100 MG: 50 TABLET, FILM COATED ORAL at 21:00

## 2019-02-21 RX ADMIN — ASPIRIN 81 MG 162 MG: 81 TABLET ORAL at 07:48

## 2019-02-21 RX ADMIN — DULOXETINE HYDROCHLORIDE 60 MG: 30 CAPSULE, DELAYED RELEASE ORAL at 07:48

## 2019-02-21 RX ADMIN — HEPARIN SODIUM 5000 UNITS: 5000 INJECTION, SOLUTION INTRAVENOUS; SUBCUTANEOUS at 15:56

## 2019-02-21 RX ADMIN — OXYCODONE HYDROCHLORIDE 15 MG: 5 TABLET ORAL at 06:21

## 2019-02-21 RX ADMIN — SALINE NASAL SPRAY 2 SPRAY: 1.5 SOLUTION NASAL at 07:56

## 2019-02-21 RX ADMIN — BACLOFEN 10 MG: 10 TABLET ORAL at 15:56

## 2019-02-21 RX ADMIN — LOSARTAN POTASSIUM 50 MG: 50 TABLET, FILM COATED ORAL at 07:47

## 2019-02-21 RX ADMIN — ACETAMINOPHEN 650 MG: 325 TABLET, FILM COATED ORAL at 10:10

## 2019-02-21 RX ADMIN — HEPARIN SODIUM 5000 UNITS: 5000 INJECTION, SOLUTION INTRAVENOUS; SUBCUTANEOUS at 07:48

## 2019-02-21 RX ADMIN — OXYCODONE HYDROCHLORIDE 15 MG: 5 TABLET ORAL at 11:51

## 2019-02-21 RX ADMIN — BACLOFEN 10 MG: 10 TABLET ORAL at 07:47

## 2019-02-21 RX ADMIN — GABAPENTIN 600 MG: 600 TABLET, FILM COATED ORAL at 21:00

## 2019-02-21 RX ADMIN — METOPROLOL SUCCINATE 50 MG: 50 TABLET, EXTENDED RELEASE ORAL at 07:47

## 2019-02-21 RX ADMIN — BACLOFEN 10 MG: 10 TABLET ORAL at 21:00

## 2019-02-21 RX ADMIN — OXYCODONE HYDROCHLORIDE 15 MG: 5 TABLET ORAL at 18:05

## 2019-02-21 RX ADMIN — GABAPENTIN 600 MG: 600 TABLET, FILM COATED ORAL at 07:47

## 2019-02-21 RX ADMIN — DULOXETINE HYDROCHLORIDE 60 MG: 30 CAPSULE, DELAYED RELEASE ORAL at 20:59

## 2019-02-21 ASSESSMENT — ACTIVITIES OF DAILY LIVING (ADL)
ADLS_ACUITY_SCORE: 28
ADLS_ACUITY_SCORE: 28
ADLS_ACUITY_SCORE: 27
ADLS_ACUITY_SCORE: 27
ADLS_ACUITY_SCORE: 29
ADLS_ACUITY_SCORE: 31

## 2019-02-21 ASSESSMENT — MIFFLIN-ST. JEOR: SCORE: 1660.01

## 2019-02-21 NOTE — PLAN OF CARE
VSS. Pt denies any SOB, on room air. c/o back/Right hip pain; on scheduled oxycodone, T&R q2h, and ice packed. Regular diet, good appetite,  and 119. PIV saline locked. Incontinent, purewick in place, output this  ML. Wounds on Jan groins; red/blanchable, open and buttocks; red/blanchable cleaned with soap and water and triad paste applied. Possible discharge tomorrow pending placement.

## 2019-02-21 NOTE — PROGRESS NOTES
D:  Per record review, pt's discharge recommendation is TCU.  Referrals were sent.    I:  Sw spoke with Jere at [facility] [phone] who said that they are trying to get a prior auth from the pt's insurance.  They said that they have a bed available and can accept her clinically once they get the approval from the insurance.  [facility] in [place] will not have any openings until next week.  Sw updated the pt and she is okay with going to [facility].    A/P:  Sw will continue with discharge planning and will be available as needed until discharge.  Transport will need to be arranged.

## 2019-02-21 NOTE — PLAN OF CARE
A/Ox4, Asstx2 mechanical lift, puriwick placed on NOC shift per pt request for rest/comfort - voiding spontaneously without difficulty, VSS, foam to coccyx intact - small scabbing with blanchable erythema, scheduled oxy 15mg for pain 5-6/10, no PIV access - MD aware, , BLE edema +1 to +2, ok to discharge - awaiting placement, continue with plan of care.

## 2019-02-21 NOTE — PROGRESS NOTES
[facility]  Hospitalist Progress Note    Jacob Chairez MD     Reason for Stay (Diagnosis): Sepsis, UTI, nephrolithiasis respiratory failure         Assessment and Plan:      Summary of Stay:   Amanda Richardson is a 53 year old female with past medical history significant for advanced MS, diabetes mellitus, hypertension, history of T-cell lymphoma status post treatment, chronically elevated WBC count, history of recurrent UTIs who presented with altered mental status increased weakness and somnolence.  Evaluation in the emergency room showed significant leukocytosis, hypotension, acute renal failure, obstructing left kidney stone with moderate hydronephrosis and respiratory failure with acidosis with hypercapnia.  She was admitted was admitted on 1/30/2019 with septic shock.  Patient received IV antibiotics and fluid resuscitation in the emergency room and underwent emergent cystoscopy and left retrograde pyelogram with stent placement on 1/30/2019.  CT also showed closed fracture of right femur.  She was initially placed on BiPAP for respiratory failure but perioperatively was intubated. she was admitted to ICU for further evaluation.     1/31/2019 patient developed wide-complex tachycardia appeared to be SVT failed to respond to vasovagal maneuvers.  Adenosine was given which showed rhythm to be atrial flutter she received cardioversion and was started on amiodarone.  Angiotensin II drip was initiated briefly for hypotension.  She is now off pressors as well as sedative meds.      Continues to improve. Pt extubated successfully on 2/8. Now able to eat per SLP. Pt interested in PT and OT.   She was transferred to the medical floor on 2/10/19.  Ready for discharge when TCU has been found.  Remove PICC line today.  Consider Bobo removal tomorrow.  Diuretics now held as bicarb is going up and minimal edema remaining.    Problem List/Assessment and Plan:   1. Septic shock likely due to urinary  tract source: (altered mental status, acute renal failure, hypotension, leukocytosis, acidosis, respiratory failure).  She received aggressive fluid resuscitation along with initiation of pressors prior to being taken to the operating room.  She now is stable and shock is fully resolved.  -CT scan on admission showed obstructing stone with hydronephrosis and acute renal failure  -Status post cystoscopy and stent placement 1/30/2019.  Per report patient did have purulent drainage noted during cystoscopy.  -started zosyn to cover for E. Coli isolate, then narrowed to ceftriaxone based on sensitivities. Completed 14-day course on 2/13.  -Blood and urine cultures were positive for E coli     2. Acute hypercapnic respiratory failure:  Still with some hypoxia likely related to volume overload from resolved sepsis and associated necessary management with severe deconditioning and atelectasis.  -Likely multifactorial from sepsis and chronic narcotic use  -Was intubated perioperatively and remained intubated following the procedure until 2/8  -Has been diuresed.  Serum bicarbonate going up, likely secondary to diuresis.  Obtain VBG tomorrow to make sure not developing hypercapnia again along with serum bicarb and holding diuretics     3. Acute renal failure with left hydronephrosis: Resolved and now at baseline  -Secondary to obstructive uropathy.  Creatinine on admission 2.25, now with creatinine 0.6 -0.7  -Status post a ureteral stent placement; anticipate outpatient follow-up for definitive treatment of the stone and removal of the stent.    4. New Atrial flutter provoked by urosepsis.  Stable without recurrence.  -Status post DC cardioversion 1/31/2019  -She was evaluated by cardiologist  -Cardiac echo when compared to prior study showed minimal deterioration of left ventricular systolic function.  EF 50-55%  -No anticoagulation for now per cardiology patient was in atrial flutter for less than 8 hours  -Switched to oral  "amiodarone for a total of 2 weeks which she has now completed therapy with  -Continue metoprolol XL 50 mg daily     5. Right femoral neck fracture  -Incidental finding on CT.  Likely present for quite some time.  -Patient has MS and at baseline is able to pivot herself  -At this time, pt notes that the right hip is painful with vigorous movement.  -Orthopedic surgery was consulted.  No immediate intervention recommended at this time, defer to their expertise re: ongoing management/weight bearing etc.     6. Type 2 diabetes mellitus, \"borderline\"  -At baseline on metformin will hold for now  -Sliding scale insulin     7. MS with associated chronic pain  -Prior to admission on amitriptyline, baclofen, duloxetine, gabapentin and oxycodone  -PT evaluation     8. Leukocytosis: Improving  -Patient has history of T-cell non-Hodgkin's lymphoma status post treatment with baseline elevated white cell count (mid-teens)     9. H/o Hypertension  -resumed losartan and hydrochlorothiazide (not quite at pre-hospitalization doses).  Serum bicarbonate going up and 5 L urine output yesterday so will stop HCTZ for now     10. Bilateral inguinal wounds  -managed with topical wound cares     11. Constipation: In the setting of scheduled narcotic use.  Continue laxatives, give enema.  Now resolved.    12. Anemia: Hemoglobin 9-10 range while here.  Normocytic.  Suspect secondary to sepsis and renal failure.  Should improve in time.  No GI blood loss noted.      DVT Prophylaxis: Heparin SQ  Code Status: Full Code  FEN: Regular diet  Discharge Dispo: Social work working on finding TCU.  Estimated Disch Date / # of Days until Disch: When placement is available for the patient    Patient is medically stable and can be discharged when placement is available for her at TCU.  I discussed with patient the plan of care, she understands she is awaiting placement and will be discharged.        Interval History (Subjective):      Patient seen and " "examined, she was sleeping this morning, no new overnight issues, feels better, no cough, runny nose, no nausea or vomiting. Denies pain at this time.                  Physical Exam:      Last Vital Signs:  /85 (BP Location: Left arm)   Pulse 79   Temp 96.5  F (35.8  C) (Axillary)   Resp 20   Ht 1.626 m (5' 4\")   Wt 108 kg (238 lb 1.6 oz)   LMP 12/11/2011   SpO2 92%   BMI 40.87 kg/m      Intake/Output Summary (Last 24 hours) reviewed    Constitutional: Awake, NAD.  Eyes: sclera white   HEENT:   MMM  Respiratory: no respiratory distress, lungs cta bilaterally, no crackles or wheeze  Cardiovascular: RRR.  No murmur   GI: non-tender, not distended, bowel sounds present  Genitourinary: Bobo catheter in place with yellow urine in bag  Skin: Chronic venous stasis changes of lower legs.  Musculoskeletal/extremities: Right arm PICC in place.  Wrinkling of skin with trace bilateral lower extremity edema remaining  Neurologic: A&O   Psychiatric: calm, cooperative          Medications:        Current Facility-Administered Medications   Medication     acetaminophen (TYLENOL) tablet 650 mg     amitriptyline (ELAVIL) tablet 100 mg     aspirin (ASA) chewable tablet 162 mg     atorvastatin (LIPITOR) tablet 10 mg     baclofen (LIORESAL) tablet 10 mg     bisacodyl (DULCOLAX) Suppository 10 mg     bisacodyl (DULCOLAX) Suppository 10 mg     glucose gel 15-30 g    Or     dextrose 50 % injection 25-50 mL    Or     glucagon injection 1 mg     docusate sodium (COLACE) capsule 100 mg     DULoxetine (CYMBALTA) EC capsule 60 mg     gabapentin (NEURONTIN) tablet 600 mg     heparin lock flush 10 UNIT/ML injection 5-10 mL     heparin sodium injection 5,000 Units     insulin aspart (NovoLOG) inj (RAPID ACTING)     insulin aspart (NovoLOG) inj (RAPID ACTING)     ipratropium - albuterol 0.5 mg/2.5 mg/3 mL (DUONEB) neb solution 3 mL     lidocaine 1 % 0.5-5 mL     lidocaine 1 % 1 mL     losartan (COZAAR) tablet 50 mg     magnesium " sulfate 2 g in NS intermittent infusion ([facility]ar[employer]ium or [facility] Cmpd)     magnesium sulfate 4 g in 100 mL sterile water (premade)     metoprolol succinate ER (TOPROL-XL) 24 hr tablet 50 mg     naloxone (NARCAN) injection 0.1-0.4 mg     ondansetron (ZOFRAN-ODT) ODT tab 4 mg    Or     ondansetron (ZOFRAN) injection 4 mg     oxyCODONE (ROXICODONE) tablet 15 mg     pink lady enema (COMPOUNDED: docusate, magnesium citrate, mineral oil, sodium phosphate)     polyethylene glycol (MIRALAX/GLYCOLAX) Packet 17 g     polyethylene glycol (MIRALAX/GLYCOLAX) Packet 17 g     potassium chloride (KLOR-CON) Packet 20-40 mEq     potassium chloride 10 mEq in 100 mL intermittent infusion with 10 mg lidocaine     potassium chloride 10 mEq in 100 mL sterile water intermittent infusion (premix)     potassium chloride 20 mEq in 50 mL intermittent infusion     potassium chloride ER (K-DUR/KLOR-CON M) CR tablet 20-40 mEq     potassium phosphate 15 mmol in D5W 250 mL intermittent infusion     potassium phosphate 20 mmol in D5W 250 mL intermittent infusion     potassium phosphate 20 mmol in D5W 500 mL intermittent infusion     potassium phosphate 25 mmol in D5W 500 mL intermittent infusion     senna-docusate (SENOKOT-S/PERICOLACE) 8.6-50 MG per tablet 3 tablet     sodium chloride (OCEAN) 0.65 % nasal spray 1-2 spray     sodium chloride (PF) 0.9% PF flush 10-20 mL     sodium chloride (PF) 0.9% PF flush 10-20 mL     sodium chloride (PF) 0.9% PF flush 3 mL          Data:      All new lab and imaging data was reviewed.   Labs:  Recent Labs   Lab 02/21/19  0634 02/19/19  0631 02/18/19  0616 02/15/19  0634   WBC  --  11.7*  --  12.7*   HGB  --  10.1*  --  10.3*   HCT  --  34.0*  --  34.0*   MCV  --  88  --  87    432 464* 592*     Recent Labs   Lab 02/21/19  1131 02/21/19  0710 02/21/19  0201 02/20/19  2124 02/20/19  1654  02/19/19  0631  02/16/19  0621   GLC  --   --   --   --   --   --  91 --  91   * 85 102* 119* 104*   < >  --    < >  --      < > = values in this interval not displayed.      Imaging:   None today      Jacob Chairez MD

## 2019-02-21 NOTE — PLAN OF CARE
VS stable. A & O x 4.   Diet: regular.   Ambulates:mechanical lift, patient is wheelchair bound at baseline. Q2-3 hour turns as tolerated with patient sitting in the chair for lunch   IV meds: no IV in place since Monday, per report MD is aware  B, 172- 1 unit of insulin given at lunch   Pain:6/10. Patient has baseline about 5/10 lower back pain. Scheduled oxycodone given.   Abnormal assessments: wounds in bilateral groin, wound care done,Bottom has some redness- [place]. Scab healed on left leg. Lungs diminished  Intake & output: no bowel movement this shift.   Drains/devices : purewick in place, new wick put in   Discharge plan: medically stable per MD note, waiting for placement. Will continue to monitor and review plan of care.      *Per social work- just waiting for insurance authorization and will likely discharge to [facility].

## 2019-02-22 ENCOUNTER — APPOINTMENT (OUTPATIENT)
Dept: OCCUPATIONAL THERAPY | Facility: CLINIC | Age: 54
DRG: 853 | End: 2019-02-22
Attending: INTERNAL MEDICINE
Payer: COMMERCIAL

## 2019-02-22 LAB
GLUCOSE BLDC GLUCOMTR-MCNC: 109 MG/DL (ref 70–99)
GLUCOSE BLDC GLUCOMTR-MCNC: 115 MG/DL (ref 70–99)
GLUCOSE BLDC GLUCOMTR-MCNC: 119 MG/DL (ref 70–99)
GLUCOSE BLDC GLUCOMTR-MCNC: 127 MG/DL (ref 70–99)
GLUCOSE BLDC GLUCOMTR-MCNC: 80 MG/DL (ref 70–99)

## 2019-02-22 PROCEDURE — 12000000 ZZH R&B MED SURG/OB

## 2019-02-22 PROCEDURE — 25000132 ZZH RX MED GY IP 250 OP 250 PS 637: Performed by: INTERNAL MEDICINE

## 2019-02-22 PROCEDURE — 00000146 ZZHCL STATISTIC GLUCOSE BY METER IP

## 2019-02-22 PROCEDURE — 99232 SBSQ HOSP IP/OBS MODERATE 35: CPT | Performed by: INTERNAL MEDICINE

## 2019-02-22 PROCEDURE — 97530 THERAPEUTIC ACTIVITIES: CPT | Mod: GP | Performed by: PHYSICAL THERAPIST

## 2019-02-22 PROCEDURE — 97166 OT EVAL MOD COMPLEX 45 MIN: CPT | Mod: GO

## 2019-02-22 PROCEDURE — G0463 HOSPITAL OUTPT CLINIC VISIT: HCPCS

## 2019-02-22 PROCEDURE — 25000128 H RX IP 250 OP 636: Performed by: SURGERY

## 2019-02-22 PROCEDURE — 97535 SELF CARE MNGMENT TRAINING: CPT | Mod: GO

## 2019-02-22 PROCEDURE — 99207 ZZC CDG-MDM COMPONENT: MEETS LOW - DOWN CODED: CPT | Performed by: INTERNAL MEDICINE

## 2019-02-22 RX ADMIN — GABAPENTIN 600 MG: 600 TABLET, FILM COATED ORAL at 09:08

## 2019-02-22 RX ADMIN — METOPROLOL SUCCINATE 50 MG: 50 TABLET, EXTENDED RELEASE ORAL at 09:08

## 2019-02-22 RX ADMIN — BACLOFEN 10 MG: 10 TABLET ORAL at 20:58

## 2019-02-22 RX ADMIN — OXYCODONE HYDROCHLORIDE 15 MG: 5 TABLET ORAL at 12:06

## 2019-02-22 RX ADMIN — DULOXETINE HYDROCHLORIDE 60 MG: 30 CAPSULE, DELAYED RELEASE ORAL at 20:58

## 2019-02-22 RX ADMIN — HEPARIN SODIUM 5000 UNITS: 5000 INJECTION, SOLUTION INTRAVENOUS; SUBCUTANEOUS at 00:16

## 2019-02-22 RX ADMIN — OXYCODONE HYDROCHLORIDE 15 MG: 5 TABLET ORAL at 00:13

## 2019-02-22 RX ADMIN — HEPARIN SODIUM 5000 UNITS: 5000 INJECTION, SOLUTION INTRAVENOUS; SUBCUTANEOUS at 09:08

## 2019-02-22 RX ADMIN — DULOXETINE HYDROCHLORIDE 60 MG: 30 CAPSULE, DELAYED RELEASE ORAL at 09:08

## 2019-02-22 RX ADMIN — ASPIRIN 81 MG 162 MG: 81 TABLET ORAL at 09:08

## 2019-02-22 RX ADMIN — HEPARIN SODIUM 5000 UNITS: 5000 INJECTION, SOLUTION INTRAVENOUS; SUBCUTANEOUS at 16:24

## 2019-02-22 RX ADMIN — GABAPENTIN 600 MG: 600 TABLET, FILM COATED ORAL at 16:23

## 2019-02-22 RX ADMIN — OXYCODONE HYDROCHLORIDE 15 MG: 5 TABLET ORAL at 18:12

## 2019-02-22 RX ADMIN — GABAPENTIN 600 MG: 600 TABLET, FILM COATED ORAL at 20:57

## 2019-02-22 RX ADMIN — AMITRIPTYLINE HYDROCHLORIDE 100 MG: 50 TABLET, FILM COATED ORAL at 20:57

## 2019-02-22 RX ADMIN — LOSARTAN POTASSIUM 50 MG: 50 TABLET, FILM COATED ORAL at 09:08

## 2019-02-22 RX ADMIN — BACLOFEN 10 MG: 10 TABLET ORAL at 09:08

## 2019-02-22 RX ADMIN — BACLOFEN 10 MG: 10 TABLET ORAL at 16:23

## 2019-02-22 RX ADMIN — OXYCODONE HYDROCHLORIDE 15 MG: 5 TABLET ORAL at 06:10

## 2019-02-22 RX ADMIN — ATORVASTATIN CALCIUM 10 MG: 10 TABLET, FILM COATED ORAL at 09:08

## 2019-02-22 ASSESSMENT — ACTIVITIES OF DAILY LIVING (ADL)
ADLS_ACUITY_SCORE: 27
ADLS_ACUITY_SCORE: 24
ADLS_ACUITY_SCORE: 27
PREVIOUS_RESPONSIBILITIES: MEDICATION MANAGEMENT;MEAL PREP
ADLS_ACUITY_SCORE: 31
ADLS_ACUITY_SCORE: 30
ADLS_ACUITY_SCORE: 27

## 2019-02-22 ASSESSMENT — MIFFLIN-ST. JEOR: SCORE: 1555.23

## 2019-02-22 NOTE — PLAN OF CARE
PT: New orders received. Pt was to discharge last week on Friday, so PT was discontinued at that time. Pt extensive stay since 1/30, which included intubation. See hospitalist note. Pt at baseline W/C dependent but inde with transfers, toileting, has assist with dressing and showering. Pt home alone during day, lives with  in W/C accessible home with stair lift.   Discharge Planner PT   Patient plan for discharge: TCU, but awaiting prior auth  Current status: Goals reviewed with patient, improving transfers as inde at baseline. Pt does not have W/C here and has speciality bed, so option of working on transfer from recliner is the safest at this point. Pt was educated on magan steady for improved safe attempt to transfer. Pt was cued for 10 attempts at sit to modified standing(pt reports she was unable to completely stand even at baseline, performing squat pivot transfer at baseline) with magan steady. Pt was cued for proper wt shift and also use of glutes(pushing through heels). Pt was cued for arm push ups from chair x 10 as well. Pt able to clear bottom better with arm push ups, but not clearing the arms rests at this point.   Barriers to return to prior living situation: needing lift for transfers at this time  Recommendations for discharge: TCU  Rationale for recommendations: Pt would benefit from continued therapy to improve tolerance to transfers back to baseline.        Entered by: Florecita Chambers 02/22/2019 12:23 PM

## 2019-02-22 NOTE — PLAN OF CARE
VS stable. NOGUERA. Diminished LS. +1 edema to BLE. Complained of pain in right hip and back and pain med's given. Slept well throughout the night.

## 2019-02-22 NOTE — PLAN OF CARE
OT: Order received, eval completed and treatment initiated. Pt is a 53 year old female admitted with AMS, weakness, found to have septic shock, also required intubation during this prolonged stay. PMH significant for MS (pivots indep at baseline). Pt lives with spouse in split level house, ramp to enter, chair lift to main level, walk in shower with shower chair, comfort height toilet and BSC. Pt reports mod I with ADLs, cooking, med mgmt and mobility tasks with use of manual w/c for mobility, able to pivot transfer indep at baseline.    Discharge Planner OT   Patient plan for discharge: TCU, awaiting insurance auth  Current status: Pt completed bed mobility supine <> sit EOB with mod/max A, required occasional min A while seated EOB for upright sitting balance. Pt required total A for LB dressing sock mgmt task, min A for grooming task, currently requiring total A for toileting tasks. Pt motivated to participate and return to PLOF in home environment.   Barriers to return to prior living situation: Current level of assist for ADLs, lift assist for transfers, decreased strength and functional activity tolerance  Recommendations for discharge: TCU  Rationale for recommendations: Pt is significantly below baseline level of functioning in areas of ADLs and mobility tasks. Recommend ongoing skilled OT to maximize safety and indep through improved strength, balance, and functional activity tolerance.        Entered by: Karen San 02/22/2019 3:16 PM

## 2019-02-22 NOTE — PLAN OF CARE
RN SHIFT SUMMARY :  DX/STORY : admit of 1/30  from home with UTI, Resp. Failure ( extubated 2 weeks ago-2/8 )   HX : DM 2, MS- w/c bound at baseline , FX rt hip --unknown date ( hx of fall )  -Was intubated with Ureteral stents procedure  and remained intubated  until 2/8   (Acute renal failure with left  obstructive uropathy )  -Status post a ureteral stent placement;    New Atrial flutter provoked by urosepsis. Status post DC cardioversion 1/31/2019  Non-Hodgkin's lymphoma .Hypertension, Bilateral inguinal wounds  LABS/PROTOCOLS : none today - just glucs   TELE : no   ASSESS : a/o, has pain in rt hip/back- takes PO oxy q 6 hrs. Sl. Discoloration of L/E- has venous Stasis looking legs, numb hands/feet at baseline .  Pure Wic in place when in bed. On Pullsate Mattress  TEACHING : discussed POC with pt/spouse.   PLAN : at baseline is  & from [place].   D/C goal: awaiting TCU in [facility] . ORTHO CONSULTED for rt hip fx- no surgery now- F/U   With ortho 3/15

## 2019-02-22 NOTE — PROGRESS NOTES
[facility]  Hospitalist Progress Note    Jacob Chairez MD     Reason for Stay (Diagnosis): Sepsis, UTI, nephrolithiasis respiratory failure         Assessment and Plan:      Summary of Stay:   Amanda Richardson is a 53 year old female with past medical history significant for advanced MS, diabetes mellitus, hypertension, history of T-cell lymphoma status post treatment, chronically elevated WBC count, history of recurrent UTIs who presented with altered mental status increased weakness and somnolence.  Evaluation in the emergency room showed significant leukocytosis, hypotension, acute renal failure, obstructing left kidney stone with moderate hydronephrosis and respiratory failure with acidosis with hypercapnia.  She was admitted was admitted on 1/30/2019 with septic shock.  Patient received IV antibiotics and fluid resuscitation in the emergency room and underwent emergent cystoscopy and left retrograde pyelogram with stent placement on 1/30/2019.  CT also showed closed fracture of right femur.  She was initially placed on BiPAP for respiratory failure but perioperatively was intubated. she was admitted to ICU for further evaluation.     1/31/2019 patient developed wide-complex tachycardia appeared to be SVT failed to respond to vasovagal maneuvers.  Adenosine was given which showed rhythm to be atrial flutter she received cardioversion and was started on amiodarone.  Angiotensin II drip was initiated briefly for hypotension.  She is now off pressors as well as sedative meds.      Continues to improve. Pt extubated successfully on 2/8. Now able to eat per SLP. Pt interested in PT and OT.   She was transferred to the medical floor on 2/10/19.  Ready for discharge when TCU has been found.  Remove PICC line today.  Consider Bobo removal tomorrow.  Diuretics now held as bicarb is going up and minimal edema remaining.    Problem List/Assessment and Plan:   1. Septic shock likely due to urinary  tract source: (altered mental status, acute renal failure, hypotension, leukocytosis, acidosis, respiratory failure).  She received aggressive fluid resuscitation along with initiation of pressors prior to being taken to the operating room.  She now is stable and shock is fully resolved.  -CT scan on admission showed obstructing stone with hydronephrosis and acute renal failure  -Status post cystoscopy and stent placement 1/30/2019.  Per report patient did have purulent drainage noted during cystoscopy.  -started zosyn to cover for E. Coli isolate, then narrowed to ceftriaxone based on sensitivities. Completed 14-day course on 2/13.  -Blood and urine cultures were positive for E coli     2. Acute hypercapnic respiratory failure:  Still with some hypoxia likely related to volume overload from resolved sepsis and associated necessary management with severe deconditioning and atelectasis.  -Likely multifactorial from sepsis and chronic narcotic use  -Was intubated perioperatively and remained intubated following the procedure until 2/8  -Has been diuresed.  Serum bicarbonate going up, likely secondary to diuresis.  Obtain VBG tomorrow to make sure not developing hypercapnia again along with serum bicarb and holding diuretics     3. Acute renal failure with left hydronephrosis: Resolved and now at baseline  -Secondary to obstructive uropathy.  Creatinine on admission 2.25, now with creatinine 0.6 -0.7  -Status post a ureteral stent placement; anticipate outpatient follow-up for definitive treatment of the stone and removal of the stent.    4. New Atrial flutter provoked by urosepsis.  Stable without recurrence.  -Status post DC cardioversion 1/31/2019  -She was evaluated by cardiologist  -Cardiac echo when compared to prior study showed minimal deterioration of left ventricular systolic function.  EF 50-55%  -No anticoagulation for now per cardiology patient was in atrial flutter for less than 8 hours  -Switched to oral  "amiodarone for a total of 2 weeks which she has now completed therapy with  -Continue metoprolol XL 50 mg daily     5. Right femoral neck fracture  -Incidental finding on CT.  Likely present for quite some time.  -Patient has MS and at baseline is able to pivot herself  -At this time, pt notes that the right hip is painful with vigorous movement.  -Orthopedic surgery was consulted.  No immediate intervention recommended at this time, defer to their expertise re: ongoing management/weight bearing etc.     6. DM II\"borderline\"  -At baseline on metformin will hold for now  -Sliding scale insulin     7. MS with associated chronic pain  -Prior to admission on amitriptyline, baclofen, duloxetine, gabapentin and oxycodone  -PT evaluation     8. Leukocytosis: Improving  -Patient has history of T-cell non-Hodgkin's lymphoma status post treatment with baseline elevated white cell count (mid-teens)     9. H/o Hypertension  -Resumed losartan and hydrochlorothiazide (not quite at pre-hospitalization doses).  Serum bicarbonate going up and 5 L urine output yesterday so will stop HCTZ for now     10. Bilateral inguinal wounds  -Managed with topical wound cares     11. Constipation: Resolved.  Continue laxatives needed.    12. Anemia: Hemoglobin 9-10 range while here.  Normocytic.  Suspect secondary to sepsis and renal failure.  Should improve in time.  No GI blood loss noted.    DVT Prophylaxis: Heparin SQ  Code Status: Full Code  FEN: Regular diet  Discharge Dispo: Social work working on finding TCU.  Estimated Disch Date / # of Days until Disch: When placement is available for the patient    Patient is medically stable and can be discharged when placement is available for her at TCU.  I discussed with patient the plan of care, she understands she is awaiting placement and will be discharged.  I again discussed with nursing staff, TCU asking for the patient to be reevaluated by PT and reordered.        Interval History " "(Subjective):      Patient seen and examined, no new overnight issues, patient's out of bed in a chair, no new overnight issues, feels better, no cough, runny nose, no nausea or vomiting. Denies pain at this time.                  Physical Exam:      Last Vital Signs:  /65   Pulse 79   Temp 97.5  F (36.4  C) (Oral)   Resp 18   Ht 1.626 m (5' 4\")   Wt 97.5 kg (215 lb)   LMP 12/11/2011   SpO2 90%   BMI 36.90 kg/m      Intake/Output Summary (Last 24 hours) reviewed    Constitutional: Awake, NAD.  Eyes: sclera white   HEENT:   MMM  Respiratory: no respiratory distress, lungs cta bilaterally, no crackles or wheeze  Cardiovascular: RRR.  No murmur   GI: non-tender, not distended, bowel sounds present  Genitourinary: Bobo catheter in place with yellow urine in bag  Skin: Chronic venous stasis changes of lower legs.  Musculoskeletal/extremities: Right arm PICC in place.  Wrinkling of skin with trace bilateral lower extremity edema remaining  Neurologic: A&O   Psychiatric: calm, cooperative          Medications:        Current Facility-Administered Medications   Medication     acetaminophen (TYLENOL) tablet 650 mg     amitriptyline (ELAVIL) tablet 100 mg     aspirin (ASA) chewable tablet 162 mg     atorvastatin (LIPITOR) tablet 10 mg     baclofen (LIORESAL) tablet 10 mg     bisacodyl (DULCOLAX) Suppository 10 mg     bisacodyl (DULCOLAX) Suppository 10 mg     glucose gel 15-30 g    Or     dextrose 50 % injection 25-50 mL    Or     glucagon injection 1 mg     docusate sodium (COLACE) capsule 100 mg     DULoxetine (CYMBALTA) EC capsule 60 mg     gabapentin (NEURONTIN) tablet 600 mg     heparin lock flush 10 UNIT/ML injection 5-10 mL     heparin sodium injection 5,000 Units     insulin aspart (NovoLOG) inj (RAPID ACTING)     insulin aspart (NovoLOG) inj (RAPID ACTING)     ipratropium - albuterol 0.5 mg/2.5 mg/3 mL (DUONEB) neb solution 3 mL     lidocaine 1 % 0.5-5 mL     lidocaine 1 % 1 mL     losartan (COZAAR) " tablet 50 mg     magnesium sulfate 2 g in NS intermittent infusion (Phar[facility]ium or FV Cmpd)     magnesium sulfate 4 g in 100 mL sterile water (premade)     metoprolol succinate ER (TOPROL-XL) 24 hr tablet 50 mg     naloxone (NARCAN) injection 0.1-0.4 mg     ondansetron (ZOFRAN-ODT) ODT tab 4 mg    Or     ondansetron (ZOFRAN) injection 4 mg     oxyCODONE (ROXICODONE) tablet 15 mg     pink lady enema (COMPOUNDED: docusate, magnesium citrate, mineral oil, sodium phosphate)     polyethylene glycol (MIRALAX/GLYCOLAX) Packet 17 g     polyethylene glycol (MIRALAX/GLYCOLAX) Packet 17 g     potassium chloride (KLOR-CON) Packet 20-40 mEq     potassium chloride 10 mEq in 100 mL intermittent infusion with 10 mg lidocaine     potassium chloride 10 mEq in 100 mL sterile water intermittent infusion (premix)     potassium chloride 20 mEq in 50 mL intermittent infusion     potassium chloride ER (K-DUR/KLOR-CON M) CR tablet 20-40 mEq     potassium phosphate 15 mmol in D5W 250 mL intermittent infusion     potassium phosphate 20 mmol in D5W 250 mL intermittent infusion     potassium phosphate 20 mmol in D5W 500 mL intermittent infusion     potassium phosphate 25 mmol in D5W 500 mL intermittent infusion     senna-docusate (SENOKOT-S/PERICOLACE) 8.6-50 MG per tablet 3 tablet     sodium chloride (OCEAN) 0.65 % nasal spray 1-2 spray     sodium chloride (PF) 0.9% PF flush 10-20 mL     sodium chloride (PF) 0.9% PF flush 10-20 mL     sodium chloride (PF) 0.9% PF flush 3 mL          Data:      All new lab and imaging data was reviewed.   Labs:  Recent Labs   Lab 02/21/19  0634 02/19/19  0631 02/18/19  0616   WBC  --  11.7*  --    HGB  --  10.1*  --    HCT  --  34.0*  --    MCV  --  88  --     432 464*     Recent Labs   Lab 02/22/19  1205 02/22/19  0743 02/22/19  0152 02/21/19  2148 02/21/19  1713  02/19/19  0631  02/16/19  0621   GLC  --   --   --   --   --   --  91  --  91   * 80 119* 106* 86   < >  --    < >  --     < > = values  in this interval not displayed.      Imaging:   None today      Jacob Chairez MD

## 2019-02-22 NOTE — PROGRESS NOTES
02/22/19 1444   Quick Adds   Type of Visit Initial Occupational Therapy Evaluation   Living Environment   Lives With spouse   Living Arrangements house   Home Accessibility wheelchair accessible  (stair lift)   Living Environment Comment Pt lives with spouse in split level house, ramp to enter, chair lift to main level, walk in shower with shower chair, comfort height toilet and BSC   Self-Care   Usual Activity Tolerance moderate   Current Activity Tolerance fair   Equipment Currently Used at Home commode;grab bar, tub/shower;lift device;shower chair;wheelchair, manual   Functional Level   Ambulation 4-->completely dependent   Transferring 0-->independent   Toileting 1-->assistive equipment   Bathing 1-->assistive equipment   Dressing 1-->assistive equipment   Eating 0-->independent   Communication 0-->understands/communicates without difficulty   Swallowing 0-->swallows foods/liquids without difficulty   Cognition 0 - no cognition issues reported   Fall history within last six months yes   Number of times patient has fallen within last six months 2   Which of the above functional risks had a recent onset or change? transferring;toileting;bathing;dressing   Prior Functional Level Comment Pt reports mod I with ADLs, cooking, med mgmt and mobility tasks with use of manual w/c for mobility, able to pivot transfer indep at baseline.    General Information   Onset of Illness/Injury or Date of Surgery - Date 01/30/19   Referring Physician Jacob Chairez MD   Patient/Family Goals Statement Pt's goal is to d/c to rehab prior to returning home   Additional Occupational Profile Info/Pertinent History of Current Problem Per chart: Pt is a 53 year old female admitted with AMS, weakness, found to have septic shock, also required intubation during this prolonged stay. PMH significant for MS (pivots indep at baseline).   Precautions/Limitations fall precautions   Cognitive Status Examination   Orientation orientation to  "person, place and time   Sensory Examination   Sensory Comments Baseline numbness/tingling in BUEs   Pain Assessment   Patient Currently in Pain Yes, see Vital Sign flowsheet  (6/10 \"chronic pain\")   Range of Motion (ROM)   ROM Comment WFL   Strength   Strength Comments BUE MMT: 4/5   Hand Strength   Hand Strength Comments WFL   Coordination   Upper Extremity Coordination No deficits were identified   Bed Mobility Skill: Sit to Supine   Level of [place]: Sit/Supine maximum assist (25% patients effort)   Physical Assist/Nonphysical Assist: Sit/Supine 1 person assist   Bed Mobility Skill: Supine to Sit   Level of [place]: Supine/Sit maximum assist (25% patients effort)   Physical Assist/Nonphysical Assist: Supine/Sit 1 person assist   Transfer Skills   Transfer Comments Currently requiring total A with use of lift for transfers   Balance   Balance Comments Occasional min A for sitting balance EOB   Upper Body Dressing   Level of [place]: Dress Upper Body moderate assist (50% patients effort)   Physical Assist/Nonphysical Assist: Dress Upper Body 1 person assist   Lower Body Dressing   Level of [place]: Dress Lower Body dependent (less than 25% patients effort)   Physical Assist/Nonphysical Assist: Dress Lower Body 1 person assist   Toileting   Level of [place]: Toilet dependent (less than 25% patients effort)   Physical Assist/Nonphysical Assist: Toilet 2 person assist   Grooming   Level of [place]: Grooming minimum assist (75% patients effort)   Physical Assist/Nonphysical Assist: Grooming 1 person assist   Instrumental Activities of Daily Living (IADL)   Previous Responsibilities medication management;meal prep   IADL Comments Spouse/son assist with IADLs   Activities of Daily Living Analysis   Impairments Contributing to Impaired Activities of Daily Living balance impaired;pain;strength decreased   ADL Comments Pt presents to OT below baseline level of functioning in all areas of self " "cares including bathing, dressing, grooming and toileting   General Therapy Interventions   Planned Therapy Interventions ADL retraining;IADL retraining;strengthening;transfer training   Clinical Impression   Criteria for Skilled Therapeutic Interventions Met yes, treatment indicated   OT Diagnosis Impaired ADLs, mobility tasks   Influenced by the following impairments Decreased functional activity tolerance and strength, impaired balance, pain   Assessment of Occupational Performance 5 or more Performance Deficits   Identified Performance Deficits Bathing, dressing, grooming, toileting, transfers   Clinical Decision Making (Complexity) Moderate complexity   Therapy Frequency 5 times/wk   Predicted Duration of Therapy Intervention (days/wks) 5 days   Anticipated Discharge Disposition Transitional Care Facility   Risks and Benefits of Treatment have been explained. Yes   Patient, Family & other staff in agreement with plan of care Yes   Clinical Impression Comments Pt would benefit from skilled OT to maximize safety and indep in all ADLs/IADLs and mobility tasks due to current deficits impacting function   [facility] AM-PAC  \"6 Clicks\" Daily Activity Inpatient Short Form   1. Putting on and taking off regular lower body clothing? 1 - Total   2. Bathing (including washing, rinsing, drying)? 1 - Total   3. Toileting, which includes using toilet, bedpan or urinal? 1 - Total   4. Putting on and taking off regular upper body clothing? 2 - A Lot   5. Taking care of personal grooming such as brushing teeth? 3 - A Little   6. Eating meals? 4 - None   Daily Activity Raw Score (Score out of 24.Lower scores equate to lower levels of function) 12   Total Evaluation Time   Total Evaluation Time (Minutes) 10     "

## 2019-02-22 NOTE — PLAN OF CARE
VSS. Alert and Oriented x4. Pt denies any SOB, on room air, lung sounds diminished. C/O right hip/back pain, on scheduled oxycodone, cold applied with good pain relief. Regular diet, good appetite. BG 86 and 106. Incont B&B, purewick output 700 ML this PM. Jan groin/ buttock clean with kemi spray and triad paste applied for protective barrier. SW following, placement pending for insurance verification.

## 2019-02-22 NOTE — PROGRESS NOTES
"[facility] Nurse Inpatient Wound Assessment     Assessment of wound(s) on pt's:   Bilateral groin folds/ Buttocks        Data:   Patient History:      per MD note(s):  53 year old woman with advanced MS (able to pivot) who resides at home with her .  She was brought to the ED by EMS due to weakness and apparent increased somnolence. He recognized that she probably had a UTI, \"because this is how she gets with those\".    See MD note for complete history.      Moisture Management:  Urinary Catheter    Catheter secured? Yes  Current Diet / Nutrition:   Orders Placed This Encounter      Regular Diet Adult      Advance Diet as Tolerated              Issac Assessment and sub scores:   Issac Risk Assessment Issac Risk Assessment  Issac Risk Assessment    Sensory Perception: 3-->slightly limited    Moisture: 3-->occasionally moist   Activity: 2-->chairfast     Mobility: 2-->very limited   Nutrition: 3-->adequate   Friction and Shear: 2-->potential problem  Issac Score: 15       Mattress:  Standard , Pulsate  Labs:    Recent Labs   Lab Test 02/19/19  0631  02/12/19  0555  02/09/19  2219  02/04/19  0545  10/03/13  1614   ALBUMIN  --   --  2.1*  --   --   --   --    < > 3.6*   HGB 10.1*   < >  --    < >  --    < > 10.3*   < > 12.7   INR  --   --   --   --   --   --   --   --  0.93   WBC 11.7*   < >  --    < >  --    < > 21.6*   < > 14.4*   A1C  --   --   --   --  6.6*  --   --    < >  --    CRP  --   --   --   --   --   --  57.7*   < >  --     < > = values in this interval not displayed.        Wound Assessment (location ):   Bilateral groin skin folds  Wound History:  Suspected fungal infection with Hydradenitis, skin bridges no tunneling noted    Specific Dimensions (length x width x depth, in cm):       Right Groin: entire area: 1 open area ~ 2 x 0.3 x <0.1cm moist pink. no drainage,     Left Groin: almost completely healed, skin bridge is closing and much improved, chronic wounds, no " drainage    Periwound Skin: no rash or erythema, improved fungal condition    Odor: none    Pain: no complaint    Pressure Wound Assessment:   Bilateral buttocks- fleshy area    Wound History:   See above, new consult    Specific Dimensions (length x width x depth, in cm) :  Very superficial erosion across buttocks with blanchable erythema.       Periwound Skin: intact, moist from incontinence    Drainage:  none    Pain: no complaint    Unsure if related to pressure since the area is not directly over a bony prominence, skin issue improved, healing and stable. Appears consistent with friction; moisture erosion due to high temps and now improved          Intervention:     Patient's chart evaluated.      Wound(s) was assessed    Wound Care: was done by RN    Orders  In place    Supplies  ordered: per POC and discussed with RN    Discussed plan of care with Nurse and pt          Assessment:       Skin condition is greatly improved. Pt  Reports sitting for long periods of time straight up in wheel  Chair which caused the  Chronic wounds to skin folds.  Improved and healing chronic hydradenitis skin condition with current treatment.   Education on skin and wound care with pt about use of  Products when discharged.  Plan to continue with Triad alone, please DO NOT use any Moist gauze or excessive paste.  Bilateral buttocks improved blanchable skin.          Plan:     Nursing to notify the Provider(s) and re-consult the [facility] Nurse if wound(s) deteriorate(s) or if the wound care plan needs reevaluation.    Plan of care for wound located on bilateral groin: Daily and PRN1. Wash wounds with wound spray and pat dry    2. Wash periwound with Tereza and soft cloth    3. Apply very small amount of Triad paste to OPEN wounds    6. DO NOT USE MOIST GAUZE to wounds        Wound care to Buttocks:BID and PRN    1. Gently clean skin with Tereza. Apply Light layer of Triad paste or Critic aid paste to buttocks.     2. Avoid Mepilex    3.  Pressure Prevention: turn side to side, avoid pressure points, heel lift boots, Sween to dry skin    WOC Nurse will return:    PRN

## 2019-02-22 NOTE — PROGRESS NOTES
D:  Per record review, pt's discharge recommendation is TCU.  Referrals were sent.    I:  Clinically, [facility] has accepted the pt, but they are waiting to get authorization from the pt's insurance.  Coby at [facility] [phone] called and said that the pt's insurance closed the case because the pt's PT/OT notes were not recent enough.  Estephania paged physician asking for new PT/OT consults.  The pt had not been seen by therapies since 2/15.  PT/OT saw the pt today and continue to recommend TCU.  Estephania spoke with Coby and informed her that there are new PT/OT notes, but she said that the pt will not discharge before Monday due to having to wait for the authorization.  Coby said that they will need to have PT/OT continue to see the pt daily for insurance to approve the TCU discharge.    A/P: Estephania will continue with discharge planning and will be available as needed until discharge.

## 2019-02-23 ENCOUNTER — APPOINTMENT (OUTPATIENT)
Dept: OCCUPATIONAL THERAPY | Facility: CLINIC | Age: 54
DRG: 853 | End: 2019-02-23
Attending: INTERNAL MEDICINE
Payer: COMMERCIAL

## 2019-02-23 LAB
GLUCOSE BLDC GLUCOMTR-MCNC: 105 MG/DL (ref 70–99)
GLUCOSE BLDC GLUCOMTR-MCNC: 106 MG/DL (ref 70–99)
GLUCOSE BLDC GLUCOMTR-MCNC: 108 MG/DL (ref 70–99)
GLUCOSE BLDC GLUCOMTR-MCNC: 112 MG/DL (ref 70–99)
GLUCOSE BLDC GLUCOMTR-MCNC: 116 MG/DL (ref 70–99)

## 2019-02-23 PROCEDURE — 25000128 H RX IP 250 OP 636: Performed by: SURGERY

## 2019-02-23 PROCEDURE — 97530 THERAPEUTIC ACTIVITIES: CPT | Mod: GO | Performed by: REHABILITATION PRACTITIONER

## 2019-02-23 PROCEDURE — 00000146 ZZHCL STATISTIC GLUCOSE BY METER IP

## 2019-02-23 PROCEDURE — 12000000 ZZH R&B MED SURG/OB

## 2019-02-23 PROCEDURE — 99231 SBSQ HOSP IP/OBS SF/LOW 25: CPT | Performed by: INTERNAL MEDICINE

## 2019-02-23 PROCEDURE — 25000132 ZZH RX MED GY IP 250 OP 250 PS 637: Performed by: INTERNAL MEDICINE

## 2019-02-23 RX ADMIN — OXYCODONE HYDROCHLORIDE 15 MG: 5 TABLET ORAL at 18:01

## 2019-02-23 RX ADMIN — BACLOFEN 10 MG: 10 TABLET ORAL at 07:57

## 2019-02-23 RX ADMIN — HEPARIN SODIUM 5000 UNITS: 5000 INJECTION, SOLUTION INTRAVENOUS; SUBCUTANEOUS at 07:55

## 2019-02-23 RX ADMIN — METOPROLOL SUCCINATE 50 MG: 50 TABLET, EXTENDED RELEASE ORAL at 07:56

## 2019-02-23 RX ADMIN — OXYCODONE HYDROCHLORIDE 15 MG: 5 TABLET ORAL at 12:39

## 2019-02-23 RX ADMIN — OXYCODONE HYDROCHLORIDE 15 MG: 5 TABLET ORAL at 06:23

## 2019-02-23 RX ADMIN — GABAPENTIN 600 MG: 600 TABLET, FILM COATED ORAL at 16:27

## 2019-02-23 RX ADMIN — HEPARIN SODIUM 5000 UNITS: 5000 INJECTION, SOLUTION INTRAVENOUS; SUBCUTANEOUS at 00:36

## 2019-02-23 RX ADMIN — BACLOFEN 10 MG: 10 TABLET ORAL at 16:27

## 2019-02-23 RX ADMIN — OXYCODONE HYDROCHLORIDE 15 MG: 5 TABLET ORAL at 00:36

## 2019-02-23 RX ADMIN — DULOXETINE HYDROCHLORIDE 60 MG: 30 CAPSULE, DELAYED RELEASE ORAL at 21:23

## 2019-02-23 RX ADMIN — LOSARTAN POTASSIUM 50 MG: 50 TABLET, FILM COATED ORAL at 07:55

## 2019-02-23 RX ADMIN — AMITRIPTYLINE HYDROCHLORIDE 100 MG: 50 TABLET, FILM COATED ORAL at 21:23

## 2019-02-23 RX ADMIN — GABAPENTIN 600 MG: 600 TABLET, FILM COATED ORAL at 07:55

## 2019-02-23 RX ADMIN — ATORVASTATIN CALCIUM 10 MG: 10 TABLET, FILM COATED ORAL at 07:56

## 2019-02-23 RX ADMIN — BACLOFEN 10 MG: 10 TABLET ORAL at 21:23

## 2019-02-23 RX ADMIN — GABAPENTIN 600 MG: 600 TABLET, FILM COATED ORAL at 21:23

## 2019-02-23 RX ADMIN — ASPIRIN 81 MG 162 MG: 81 TABLET ORAL at 07:56

## 2019-02-23 RX ADMIN — HEPARIN SODIUM 5000 UNITS: 5000 INJECTION, SOLUTION INTRAVENOUS; SUBCUTANEOUS at 16:28

## 2019-02-23 RX ADMIN — DULOXETINE HYDROCHLORIDE 60 MG: 30 CAPSULE, DELAYED RELEASE ORAL at 07:56

## 2019-02-23 ASSESSMENT — ACTIVITIES OF DAILY LIVING (ADL)
ADLS_ACUITY_SCORE: 14
ADLS_ACUITY_SCORE: 22
ADLS_ACUITY_SCORE: 26

## 2019-02-23 ASSESSMENT — MIFFLIN-ST. JEOR: SCORE: 1572.02

## 2019-02-23 NOTE — PROGRESS NOTES
[facility]  Hospitalist Progress Note    Jacob Chairez MD     Reason for Stay (Diagnosis): Sepsis, UTI, nephrolithiasis respiratory failure         Assessment and Plan:      Summary of Stay:   Amanda Richardson is a 53 year old female with past medical history significant for advanced MS, diabetes mellitus, hypertension, history of T-cell lymphoma status post treatment, chronically elevated WBC count, history of recurrent UTIs who presented with altered mental status increased weakness and somnolence.  Evaluation in the emergency room showed significant leukocytosis, hypotension, acute renal failure, obstructing left kidney stone with moderate hydronephrosis and respiratory failure with acidosis with hypercapnia.  She was admitted was admitted on 1/30/2019 with septic shock.  Patient received IV antibiotics and fluid resuscitation in the emergency room and underwent emergent cystoscopy and left retrograde pyelogram with stent placement on 1/30/2019.  CT also showed closed fracture of right femur.  She was initially placed on BiPAP for respiratory failure but perioperatively was intubated after cystoscopy was ureteral stent placement and was admitted to ICU for further evaluation.     1/31/2019 patient developed wide-complex tachycardia appeared to be SVT failed to respond to vasovagal maneuvers.  Adenosine was given, which showed rhythm to be atrial flutter she received cardioversion and was started on amiodarone.  Angiotensin II drip was initiated briefly for hypotension.  She is now off pressors as well as sedative meds.      Patient improved and extubated successfully on 2/8.  She remained stable, tolerated oral intake, transferred to medical floor medical floor on 2/10/19.  Continue to improve, completed his treatments, and has been ready for discharge for the last 5 days, and awaiting placement to TCU.  PICC line and a Bobo catheter removed.    Problem List/Assessment and Plan:   1.  Septic shock likely due to urinary tract source: Resolved - (altered mental status, acute renal failure, hypotension, leukocytosis, acidosis, respiratory failure).  She received aggressive fluid resuscitation along with initiation of pressors prior to being taken to the operating room.  She now is stable and shock is fully resolved.  -CT scan on admission showed obstructing stone with hydronephrosis and acute renal failure  -Status post cystoscopy and stent placement 1/30/2019.  Per report patient did have purulent drainage noted during cystoscopy.  -started zosyn to cover for E. Coli isolate, then narrowed to ceftriaxone based on sensitivities. Completed 14-day course on 2/13.  -Blood and urine cultures were positive for E coli     2. Acute hypercapnic respiratory failure: Resolved -she is stable no more hypoxic, related to volume overload from resolved sepsis and associated necessary management with severe deconditioning and atelectasis.  -Likely multifactorial from sepsis and chronic narcotic use  -Was intubated perioperatively and remained intubated following the procedure until 2/8  -Has been diuresed.  Serum bicarbonate going up, likely secondary to diuresis.  Obtain VBG tomorrow to make sure not developing hypercapnia again along with serum bicarb and holding diuretics     3. Acute renal failure with left hydronephrosis: Resolved and now at baseline  -Secondary to obstructive uropathy.  Creatinine on admission 2.25, now with creatinine 0.6 -0.7  -Status post a ureteral stent placement; anticipate outpatient follow-up for definitive treatment of the stone and removal of the stent.    4. New Atrial flutter provoked by urosepsis.  Stable without recurrence.  -Status post DC cardioversion 1/31/2019  -She was evaluated by cardiologist  -Cardiac echo when compared to prior study showed minimal deterioration of left ventricular systolic function.  EF 50-55%  -No anticoagulation for now per cardiology patient was in  "atrial flutter for less than 8 hours  -Switched to oral amiodarone for a total of 2 weeks which she has now completed therapy with  -Continue metoprolol XL 50 mg daily     5. Right femoral neck fracture  -Incidental finding on CT.  Likely present for quite some time.  -Patient has MS and at baseline is able to pivot herself  -At this time, pt notes that the right hip is painful with vigorous movement.  -Orthopedic surgery was consulted.  No immediate intervention recommended at this time, defer to their expertise re: ongoing management/weight bearing etc.     6. DM II\"borderline\"  -At baseline on metformin will hold for now  -Sliding scale insulin     7. MS with associated chronic pain  -Prior to admission on amitriptyline, baclofen, duloxetine, gabapentin and oxycodone  -PT evaluation     8. Leukocytosis: Improving  -Patient has history of T-cell non-Hodgkin's lymphoma status post treatment with baseline elevated white cell count (mid-teens)     9. H/o Hypertension  -Resumed losartan and hydrochlorothiazide (not quite at pre-hospitalization doses).  Serum bicarbonate going up and 5 L urine output yesterday so will stop HCTZ for now     10. Bilateral inguinal wounds  -Managed with topical wound cares     11. Constipation: Resolved.  Continue laxatives needed.    12. Anemia: Hemoglobin 9-10 range while here.  Normocytic.  Suspect secondary to sepsis and renal failure.  Should improve in time.  No GI blood loss noted.    DVT Prophylaxis: Heparin SQ  Code Status: Full Code  FEN: Regular diet  Discharge Dispo: Social work working on finding TCU.  Estimated Disch Date / # of Days until Disch: When placement is available for the patient    Patient is medically stable and can be discharged when placement is available for her at TCU.  I discussed with patient the plan of care, she understands she is awaiting placement and will be discharged.  I again discussed with nursing staff, TCU is good for patient to be reevaluated by " "PT and is being done.  At baseline patient is wheelchair-bound and has chronic hip fracture, and I do not think that PT re eval changes the disposition plan.        Interval History (Subjective):      Patient seen and examined, no new overnight issues, patient's sleeping this morning, planning to get out of bed to chair, no new overnight issues, feels better, no cough, runny nose, no nausea or vomiting. Denies pain at this time.                  Physical Exam:      Last Vital Signs:  /56 (BP Location: Left arm)   Pulse 79   Temp 98.3  F (36.8  C) (Oral)   Resp 18   Ht 1.626 m (5' 4\")   Wt 99.2 kg (218 lb 11.2 oz)   LMP 12/11/2011   SpO2 93%   BMI 37.54 kg/m      Intake/Output Summary (Last 24 hours) reviewed    Constitutional: Awake, NAD.  Eyes: sclera white   HEENT:   MMM  Respiratory: no respiratory distress, lungs cta bilaterally, no crackles or wheeze  Cardiovascular: RRR.  No murmur   GI: non-tender, not distended, bowel sounds present  Genitourinary: Bobo catheter in place with yellow urine in bag  Skin: Chronic venous stasis changes of lower legs.  Musculoskeletal/extremities: Right arm PICC in place.  Wrinkling of skin with trace bilateral lower extremity edema remaining  Neurologic: A&O   Psychiatric: calm, cooperative          Medications:        Current Facility-Administered Medications   Medication     acetaminophen (TYLENOL) tablet 650 mg     amitriptyline (ELAVIL) tablet 100 mg     aspirin (ASA) chewable tablet 162 mg     atorvastatin (LIPITOR) tablet 10 mg     baclofen (LIORESAL) tablet 10 mg     bisacodyl (DULCOLAX) Suppository 10 mg     bisacodyl (DULCOLAX) Suppository 10 mg     glucose gel 15-30 g    Or     dextrose 50 % injection 25-50 mL    Or     glucagon injection 1 mg     docusate sodium (COLACE) capsule 100 mg     DULoxetine (CYMBALTA) EC capsule 60 mg     gabapentin (NEURONTIN) tablet 600 mg     heparin lock flush 10 UNIT/ML injection 5-10 mL     heparin sodium injection 5,000 " Units     insulin aspart (NovoLOG) inj (RAPID ACTING)     insulin aspart (NovoLOG) inj (RAPID ACTING)     ipratropium - albuterol 0.5 mg/2.5 mg/3 mL (DUONEB) neb solution 3 mL     lidocaine 1 % 0.5-5 mL     lidocaine 1 % 1 mL     losartan (COZAAR) tablet 50 mg     magnesium sulfate 2 g in NS intermittent infusion (PharMEDium or FV Cmpd)     magnesium sulfate 4 g in 100 mL sterile water (premade)     metoprolol succinate ER (TOPROL-XL) 24 hr tablet 50 mg     naloxone (NARCAN) injection 0.1-0.4 mg     ondansetron (ZOFRAN-ODT) ODT tab 4 mg    Or     ondansetron (ZOFRAN) injection 4 mg     oxyCODONE (ROXICODONE) tablet 15 mg     pink lady enema (COMPOUNDED: docusate, magnesium citrate, mineral oil, sodium phosphate)     polyethylene glycol (MIRALAX/GLYCOLAX) Packet 17 g     polyethylene glycol (MIRALAX/GLYCOLAX) Packet 17 g     potassium chloride (KLOR-CON) Packet 20-40 mEq     potassium chloride 10 mEq in 100 mL intermittent infusion with 10 mg lidocaine     potassium chloride 10 mEq in 100 mL sterile water intermittent infusion (premix)     potassium chloride 20 mEq in 50 mL intermittent infusion     potassium chloride ER (K-DUR/KLOR-CON M) CR tablet 20-40 mEq     potassium phosphate 15 mmol in D5W 250 mL intermittent infusion     potassium phosphate 20 mmol in D5W 250 mL intermittent infusion     potassium phosphate 20 mmol in D5W 500 mL intermittent infusion     potassium phosphate 25 mmol in D5W 500 mL intermittent infusion     senna-docusate (SENOKOT-S/PERICOLACE) 8.6-50 MG per tablet 3 tablet     sodium chloride (OCEAN) 0.65 % nasal spray 1-2 spray     sodium chloride (PF) 0.9% PF flush 10-20 mL     sodium chloride (PF) 0.9% PF flush 10-20 mL     sodium chloride (PF) 0.9% PF flush 3 mL          Data:      All new lab and imaging data was reviewed.   Labs:  Recent Labs   Lab 02/21/19  0634 02/19/19  0631 02/18/19  0616   WBC  --  11.7*  --    HGB  --  10.1*  --    HCT  --  34.0*  --    MCV  --  88  --      432 464*     Recent Labs   Lab 02/23/19  1145 02/23/19  0656 02/23/19  0157 02/22/19  2104 02/22/19  1715  02/19/19  0631   GLC  --   --   --   --   --   --  91   * 105* 112* 127* 115*   < >  --     < > = values in this interval not displayed.      Imaging:   None today      Jacob Chairez MD

## 2019-02-23 NOTE — PLAN OF CARE
Pt is AOx4, Ax2 with transfers, frequent turn/repo, VSS. Lungs dim/clear upon auscultation. Pt is voiding sufficient amounts of urine, purewick in place. Refused stool softeners this shayy d/t loose incontinent BM's. Blanchable redness noted on buttocks and groin, barrier cream applied to bilateral groin and buttocks. Scheduled oxycodone helpful for right hip and back pain. Pt has a good appetite.  and 127. Will continue to monitor and encourage fluids.    Eye (Pediatric) Eye  (Pediatric)

## 2019-02-23 NOTE — PLAN OF CARE
RN SHIFT SUMMARY :  DX/STORY : admit of 1/30  from home with UTI, Resp. Failure ( extubated 2 weeks ago-2/8 )   HX : DM 2, MS- w/c bound at baseline , FX rt hip --unknown date ( hx of fall )  -Was intubated with Ureteral stents procedure  and remained intubated  until 2/8   (Acute renal failure with left  obstructive uropathy )  -Status post a ureteral stent placement;    New Atrial flutter provoked by urosepsis. Status post DC cardioversion 1/31/2019  Non-Hodgkin's lymphoma .Hypertension, Bilateral inguinal wounds  LABS/PROTOCOLS : none today - just glucs   TELE : no   ASSESS : a/o, has pain in rt hip/back- takes PO oxy q 6 hrs. Sl. Discoloration of L/E- has venous Stasis looking legs, numb hands/feet at baseline . Yeasty groin care done. Large BM on commode   Pure Wic in place when in bed. On Pullsate Mattress  TEACHING : discussed POC with pt/spouse.   PLAN : at baseline is  & from [place].   D/C goal: awaiting TCU in [facility] . ORTHO CONSULTED for rt hip fx- no surgery now- F/U   With ortho 3/15

## 2019-02-23 NOTE — PLAN OF CARE
RN SHIFT SUMMARY :  DX/STORY : admit of 1/30  from home with UTI, + E.Coli. Resp. Failure ( extubated 2 weeks ago-2/8 )   HX : DM 2, MS- w/c bound at baseline , FX rt hip/rt femoral neck  --unknown date ( hx of fall )  -Was intubated with Ureteral stents procedure  and remained intubated  until 2/8   (Acute renal failure with left  obstructive uropathy )  -Status post a ureteral stent placement;    New Atrial flutter provoked by urosepsis. Status post DC cardioversion 1/31/2019  Non-Hodgkin's lymphoma .Hypertension, Bilateral inguinal wounds  LABS/PROTOCOLS : none today - just glucs   TELE : no   ASSESS : a/o, has pain in rt hip/back- takes PO oxy q 6 hrs. Goal of 2-4 & rates it 8-9 with activity .Sl. Discoloration of L/E- has venous Stasis looking legs, numb hands/feet at baseline . Yeasty groin care done. Large BM on commode . Up to recliner & commode today.  Pure Wic in place when in bed. On Pullsate Mattress  TEACHING : discussed POC with pt/spouse.   PLAN : at baseline is  & from [place].   D/C goal: awaiting TCU in [facility] . ORTHO CONSULTED for rt hip fx- no surgery now-may wt. Bear as in past ( just does pivots )  F/U   With ortho 3/15

## 2019-02-23 NOTE — PLAN OF CARE
A&O. VSS. C/o right hip and back pain, scheduled meds given. Repositioned Q 2 hrs. Lift to commode, had BM. Purewick in place. . Discharge pending placement.   Amanda Wilburn RN on 2/23/2019 at 4:48 AM

## 2019-02-24 ENCOUNTER — APPOINTMENT (OUTPATIENT)
Dept: OCCUPATIONAL THERAPY | Facility: CLINIC | Age: 54
DRG: 853 | End: 2019-02-24
Payer: COMMERCIAL

## 2019-02-24 ENCOUNTER — APPOINTMENT (OUTPATIENT)
Dept: PHYSICAL THERAPY | Facility: CLINIC | Age: 54
DRG: 853 | End: 2019-02-24
Payer: COMMERCIAL

## 2019-02-24 LAB
GLUCOSE BLDC GLUCOMTR-MCNC: 106 MG/DL (ref 70–99)
GLUCOSE BLDC GLUCOMTR-MCNC: 110 MG/DL (ref 70–99)
GLUCOSE BLDC GLUCOMTR-MCNC: 128 MG/DL (ref 70–99)
GLUCOSE BLDC GLUCOMTR-MCNC: 135 MG/DL (ref 70–99)
GLUCOSE BLDC GLUCOMTR-MCNC: 145 MG/DL (ref 70–99)
PLATELET # BLD AUTO: 294 10E9/L (ref 150–450)

## 2019-02-24 PROCEDURE — 25000132 ZZH RX MED GY IP 250 OP 250 PS 637: Performed by: HOSPITALIST

## 2019-02-24 PROCEDURE — 97110 THERAPEUTIC EXERCISES: CPT | Mod: GO | Performed by: OCCUPATIONAL THERAPIST

## 2019-02-24 PROCEDURE — 97535 SELF CARE MNGMENT TRAINING: CPT | Mod: GO | Performed by: OCCUPATIONAL THERAPIST

## 2019-02-24 PROCEDURE — 25000128 H RX IP 250 OP 636: Performed by: SURGERY

## 2019-02-24 PROCEDURE — 99232 SBSQ HOSP IP/OBS MODERATE 35: CPT | Performed by: INTERNAL MEDICINE

## 2019-02-24 PROCEDURE — 12000000 ZZH R&B MED SURG/OB

## 2019-02-24 PROCEDURE — 85049 AUTOMATED PLATELET COUNT: CPT | Performed by: HOSPITALIST

## 2019-02-24 PROCEDURE — 97530 THERAPEUTIC ACTIVITIES: CPT | Mod: GP | Performed by: PHYSICAL THERAPY ASSISTANT

## 2019-02-24 PROCEDURE — 00000146 ZZHCL STATISTIC GLUCOSE BY METER IP

## 2019-02-24 PROCEDURE — 25000132 ZZH RX MED GY IP 250 OP 250 PS 637: Performed by: INTERNAL MEDICINE

## 2019-02-24 PROCEDURE — 36415 COLL VENOUS BLD VENIPUNCTURE: CPT | Performed by: HOSPITALIST

## 2019-02-24 RX ADMIN — GABAPENTIN 600 MG: 600 TABLET, FILM COATED ORAL at 16:17

## 2019-02-24 RX ADMIN — DULOXETINE HYDROCHLORIDE 60 MG: 30 CAPSULE, DELAYED RELEASE ORAL at 21:07

## 2019-02-24 RX ADMIN — HEPARIN SODIUM 5000 UNITS: 5000 INJECTION, SOLUTION INTRAVENOUS; SUBCUTANEOUS at 23:54

## 2019-02-24 RX ADMIN — DULOXETINE HYDROCHLORIDE 60 MG: 30 CAPSULE, DELAYED RELEASE ORAL at 08:20

## 2019-02-24 RX ADMIN — OXYCODONE HYDROCHLORIDE 15 MG: 5 TABLET ORAL at 00:07

## 2019-02-24 RX ADMIN — METOPROLOL SUCCINATE 50 MG: 50 TABLET, EXTENDED RELEASE ORAL at 08:20

## 2019-02-24 RX ADMIN — ASPIRIN 81 MG 162 MG: 81 TABLET ORAL at 08:20

## 2019-02-24 RX ADMIN — LOSARTAN POTASSIUM 50 MG: 50 TABLET, FILM COATED ORAL at 08:21

## 2019-02-24 RX ADMIN — BACLOFEN 10 MG: 10 TABLET ORAL at 08:20

## 2019-02-24 RX ADMIN — OXYCODONE HYDROCHLORIDE 15 MG: 5 TABLET ORAL at 18:04

## 2019-02-24 RX ADMIN — OXYCODONE HYDROCHLORIDE 15 MG: 5 TABLET ORAL at 23:54

## 2019-02-24 RX ADMIN — HEPARIN SODIUM 5000 UNITS: 5000 INJECTION, SOLUTION INTRAVENOUS; SUBCUTANEOUS at 00:07

## 2019-02-24 RX ADMIN — AMITRIPTYLINE HYDROCHLORIDE 100 MG: 50 TABLET, FILM COATED ORAL at 21:07

## 2019-02-24 RX ADMIN — ATORVASTATIN CALCIUM 10 MG: 10 TABLET, FILM COATED ORAL at 08:20

## 2019-02-24 RX ADMIN — BACLOFEN 10 MG: 10 TABLET ORAL at 16:17

## 2019-02-24 RX ADMIN — HEPARIN SODIUM 5000 UNITS: 5000 INJECTION, SOLUTION INTRAVENOUS; SUBCUTANEOUS at 16:17

## 2019-02-24 RX ADMIN — OXYCODONE HYDROCHLORIDE 15 MG: 5 TABLET ORAL at 11:40

## 2019-02-24 RX ADMIN — HEPARIN SODIUM 5000 UNITS: 5000 INJECTION, SOLUTION INTRAVENOUS; SUBCUTANEOUS at 08:21

## 2019-02-24 RX ADMIN — BACLOFEN 10 MG: 10 TABLET ORAL at 21:07

## 2019-02-24 RX ADMIN — OXYCODONE HYDROCHLORIDE 15 MG: 5 TABLET ORAL at 06:16

## 2019-02-24 RX ADMIN — ACETAMINOPHEN 650 MG: 325 TABLET, FILM COATED ORAL at 16:17

## 2019-02-24 RX ADMIN — GABAPENTIN 600 MG: 600 TABLET, FILM COATED ORAL at 21:07

## 2019-02-24 RX ADMIN — GABAPENTIN 600 MG: 600 TABLET, FILM COATED ORAL at 08:21

## 2019-02-24 ASSESSMENT — ACTIVITIES OF DAILY LIVING (ADL)
ADLS_ACUITY_SCORE: 24
ADLS_ACUITY_SCORE: 22
ADLS_ACUITY_SCORE: 24
ADLS_ACUITY_SCORE: 23

## 2019-02-24 ASSESSMENT — MIFFLIN-ST. JEOR: SCORE: 1604.22

## 2019-02-24 NOTE — PLAN OF CARE
Discharge Planner PT   Patient plan for discharge: TCU  Current status: attempted to stand x 4 unable to un weight to try pivot,lift to chair. Bed mobility with Mod A needed more assist for R LE due to hip fx. Able to sit EOB without support  Barriers to return to prior living situation:  Barriers to return to prior living situation: needing lift for transfers at this time  Recommendations for discharge: TCU per plan established by the PT.   Rationale for recommendations: Pt would benefit from continued therapy to improve tolerance to transfers back to baseline.      Entered by: Montserrat Joyce 02/24/2019 8:41 AM

## 2019-02-24 NOTE — PROGRESS NOTES
[facility]  Hospitalist Progress Note    Drake Greco MD     Reason for Stay (Diagnosis): Sepsis, UTI, nephrolithiasis respiratory failure         Assessment and Plan:      Summary of Stay:   Amanda Richardson is a 53 year old female with past medical history significant for advanced MS, diabetes mellitus, hypertension, history of T-cell lymphoma status post treatment, chronically elevated WBC count, history of recurrent UTIs who presented with altered mental status increased weakness and somnolence.  Evaluation in the emergency room showed significant leukocytosis, hypotension, acute renal failure, obstructing left kidney stone with moderate hydronephrosis and respiratory failure with acidosis with hypercapnia.  She was admitted was admitted on 1/30/2019 with septic shock.  Patient received IV antibiotics and fluid resuscitation in the emergency room and underwent emergent cystoscopy and left retrograde pyelogram with stent placement on 1/30/2019.  CT also showed closed fracture of right femur.  She was initially placed on BiPAP for respiratory failure but perioperatively was intubated after cystoscopy was ureteral stent placement and was admitted to ICU for further evaluation.     1/31/2019 patient developed wide-complex tachycardia appeared to be SVT failed to respond to vasovagal maneuvers.  Adenosine was given, which showed rhythm to be atrial flutter she received cardioversion and was started on amiodarone.  Angiotensin II drip was initiated briefly for hypotension.  She is now off pressors as well as sedative meds.      Patient improved and extubated successfully on 2/8.  She remained stable, tolerated oral intake, transferred to medical floor medical floor on 2/10/19.  Continue to improve, completed his treatments, and has been ready for discharge for the last 5 days, and awaiting placement to TCU.  PICC line and a Bobo catheter removed.    Problem List/Assessment and Plan:   1. Septic  shock likely due to urinary tract source: Resolved - (altered mental status, acute renal failure, hypotension, leukocytosis, acidosis, respiratory failure).  She received aggressive fluid resuscitation along with initiation of pressors prior to being taken to the operating room.  She now is stable and shock is fully resolved.  -CT scan on admission showed obstructing stone with hydronephrosis and acute renal failure  -Status post cystoscopy and stent placement 1/30/2019.  Per report patient did have purulent drainage noted during cystoscopy.  -started zosyn to cover for E. Coli isolate, then narrowed to ceftriaxone based on sensitivities. Completed 14-day course on 2/13.  -Blood and urine cultures were positive for E coli     2. Acute hypercapnic respiratory failure: Resolved -she is stable no more hypoxic, related to volume overload from resolved sepsis and associated necessary management with severe deconditioning and atelectasis.  -Likely multifactorial from sepsis and chronic narcotic use  -Was intubated perioperatively and remained intubated following the procedure until 2/8  -Has been diuresed.  Serum bicarbonate going up, likely secondary to diuresis.  Obtain VBG tomorrow to make sure not developing hypercapnia again along with serum bicarb and holding diuretics     3. Acute renal failure with left hydronephrosis: Resolved and now at baseline  -Secondary to obstructive uropathy.  Creatinine on admission 2.25, now with creatinine 0.6 -0.7  -Status post a ureteral stent placement; anticipate outpatient follow-up for definitive treatment of the stone and removal of the stent.    4. New Atrial flutter provoked by urosepsis.  Stable without recurrence.  -Status post DC cardioversion 1/31/2019  -She was evaluated by cardiologist  -Cardiac echo when compared to prior study showed minimal deterioration of left ventricular systolic function.  EF 50-55%  -No anticoagulation for now per cardiology patient was in atrial  "flutter for less than 8 hours  -Switched to oral amiodarone for a total of 2 weeks which she has now completed therapy with  -Continue metoprolol XL 50 mg daily     5. Right femoral neck fracture  -Incidental finding on CT.  Likely present for quite some time.  -Patient has MS and at baseline is able to pivot herself  -At this time, pt notes that the right hip is painful with vigorous movement.  -Orthopedic surgery was consulted.  No immediate intervention recommended at this time, defer to their expertise re: ongoing management/weight bearing etc.     6. DM II\"borderline\"  -At baseline on metformin will hold for now  -Sliding scale insulin     7. MS with associated chronic pain  -Prior to admission on amitriptyline, baclofen, duloxetine, gabapentin and oxycodone  -PT evaluation     8. Leukocytosis: Improving  -Patient has history of T-cell non-Hodgkin's lymphoma status post treatment with baseline elevated white cell count (mid-teens)     9. H/o Hypertension  -Resumed losartan and hydrochlorothiazide (not quite at pre-hospitalization doses).  Serum bicarbonate going up and 5 L urine output yesterday so will stop HCTZ for now     10. Bilateral inguinal wounds  -Managed with topical wound cares     11. Constipation: Resolved.  Continue laxatives needed.    12. Anemia: Hemoglobin 9-10 range while here.  Normocytic.  Suspect secondary to sepsis and renal failure.  Should improve in time.  No GI blood loss noted.    DVT Prophylaxis: Heparin SQ  Code Status: Full Code  FEN: Regular diet  Discharge Dispo: Social work working on finding TCU.  Estimated Disch Date / # of Days until Disch: When placement is available for the patient        Interval History (Subjective):      Patient is seen and examined by me today and medical record reviewed.Overnight events noted and care discussed with nursing staff.    doing well; no cp, sob, n/v/d, or abd pain.                          Physical Exam:      Last Vital Signs:  /54   " "Pulse 79   Temp 98.2  F (36.8  C) (Oral)   Resp 18   Ht 1.626 m (5' 4\")   Wt 102.4 kg (225 lb 12.8 oz)   LMP 12/11/2011   SpO2 90%   BMI 38.76 kg/m      Intake/Output Summary (Last 24 hours) reviewed    Constitutional: Awake, NAD.  Eyes: sclera white   HEENT:   MMM  Respiratory: no respiratory distress, lungs cta bilaterally, no crackles or wheeze  Cardiovascular: RRR.  No murmur   GI: non-tender, not distended, bowel sounds present  Genitourinary: Bobo catheter in place with yellow urine in bag  Skin: Chronic venous stasis changes of lower legs.  Musculoskeletal/extremities: Right arm PICC in place.  Wrinkling of skin with trace bilateral lower extremity edema remaining  Neurologic: A&O   Psychiatric: calm, cooperative          Medications:        Current Facility-Administered Medications   Medication     acetaminophen (TYLENOL) tablet 650 mg     amitriptyline (ELAVIL) tablet 100 mg     aspirin (ASA) chewable tablet 162 mg     atorvastatin (LIPITOR) tablet 10 mg     baclofen (LIORESAL) tablet 10 mg     bisacodyl (DULCOLAX) Suppository 10 mg     glucose gel 15-30 g    Or     dextrose 50 % injection 25-50 mL    Or     glucagon injection 1 mg     docusate sodium (COLACE) capsule 100 mg     DULoxetine (CYMBALTA) EC capsule 60 mg     gabapentin (NEURONTIN) tablet 600 mg     heparin lock flush 10 UNIT/ML injection 5-10 mL     heparin sodium injection 5,000 Units     insulin aspart (NovoLOG) inj (RAPID ACTING)     insulin aspart (NovoLOG) inj (RAPID ACTING)     ipratropium - albuterol 0.5 mg/2.5 mg/3 mL (DUONEB) neb solution 3 mL     lidocaine 1 % 0.5-5 mL     lidocaine 1 % 1 mL     losartan (COZAAR) tablet 50 mg     magnesium sulfate 2 g in NS intermittent infusion (PharMEDium or FV Cmpd)     magnesium sulfate 4 g in 100 mL sterile water (premade)     metoprolol succinate ER (TOPROL-XL) 24 hr tablet 50 mg     naloxone (NARCAN) injection 0.1-0.4 mg     ondansetron (ZOFRAN-ODT) ODT tab 4 mg    Or     ondansetron " (ZOFRAN) injection 4 mg     oxyCODONE (ROXICODONE) tablet 15 mg     pink lady enema (COMPOUNDED: docusate, magnesium citrate, mineral oil, sodium phosphate)     polyethylene glycol (MIRALAX/GLYCOLAX) Packet 17 g     polyethylene glycol (MIRALAX/GLYCOLAX) Packet 17 g     senna-docusate (SENOKOT-S/PERICOLACE) 8.6-50 MG per tablet 3 tablet     sodium chloride (OCEAN) 0.65 % nasal spray 1-2 spray     sodium chloride (PF) 0.9% PF flush 10-20 mL     sodium chloride (PF) 0.9% PF flush 10-20 mL     sodium chloride (PF) 0.9% PF flush 3 mL          Data:      All new lab and imaging data was reviewed.   Labs:  Recent Labs   Lab 02/24/19  0640 02/21/19  0634 02/19/19  0631   WBC  --   --  11.7*   HGB  --   --  10.1*   HCT  --   --  34.0*   MCV  --   --  88    376 432     Recent Labs   Lab 02/24/19  1111 02/24/19  0743 02/24/19  0206 02/23/19  2113 02/23/19  1651  02/19/19  0631   GLC  --   --   --   --   --   --  91   * 110* 135* 108* 116*   < >  --     < > = values in this interval not displayed.      Imaging:   None today      Drake Greco MD

## 2019-02-24 NOTE — PLAN OF CARE
A&Ox4,VSS, scheduled Oxycodone q6h for chronic pain r/t MS, up with lift transfer, ,108, Purewick external catheter  in place, PT/OT following, plan to discharge to TCU, will continue with POC.

## 2019-02-24 NOTE — PLAN OF CARE
Discharge Planner OT   Patient plan for discharge: TCU  Current status: pt. Able to sit up in chair and complete grooming/hygiene tasks independently after setup and reports feeling better today.  Pt. Able to tolerate 1 set of B UE AROM while seated and notes some continued fatigue.  Barriers to return to prior living situation: Current level of assist for ADLs, lift assist for transfers, decreased strength and functional activity tolerance  Recommendations for discharge: TCU  Rationale for recommendations: Pt is significantly below baseline level of functioning in areas of ADLs and mobility tasks. Recommend ongoing skilled OT to maximize safety and indep through improved strength, balance, and functional activity tolerance.         Entered by: Tita Buenrostro 02/24/2019 1:48 PM

## 2019-02-24 NOTE — PLAN OF CARE
A&O. Scheduled pain meds given for hip/back pain. . Repositioned Q 2 hrs. Purewick in place. Discharge pending placement.   Amanda Wilburn RN on 2/24/2019 at 5:21 AM

## 2019-02-25 ENCOUNTER — APPOINTMENT (OUTPATIENT)
Dept: PHYSICAL THERAPY | Facility: CLINIC | Age: 54
DRG: 853 | End: 2019-02-25
Payer: COMMERCIAL

## 2019-02-25 LAB
GLUCOSE BLDC GLUCOMTR-MCNC: 100 MG/DL (ref 70–99)
GLUCOSE BLDC GLUCOMTR-MCNC: 105 MG/DL (ref 70–99)
GLUCOSE BLDC GLUCOMTR-MCNC: 108 MG/DL (ref 70–99)
GLUCOSE BLDC GLUCOMTR-MCNC: 109 MG/DL (ref 70–99)
GLUCOSE BLDC GLUCOMTR-MCNC: 120 MG/DL (ref 70–99)
POTASSIUM SERPL-SCNC: 4.3 MMOL/L (ref 3.4–5.3)

## 2019-02-25 PROCEDURE — 36415 COLL VENOUS BLD VENIPUNCTURE: CPT | Performed by: INTERNAL MEDICINE

## 2019-02-25 PROCEDURE — 97110 THERAPEUTIC EXERCISES: CPT | Mod: GP | Performed by: PHYSICAL THERAPY ASSISTANT

## 2019-02-25 PROCEDURE — 00000146 ZZHCL STATISTIC GLUCOSE BY METER IP

## 2019-02-25 PROCEDURE — 84132 ASSAY OF SERUM POTASSIUM: CPT | Performed by: INTERNAL MEDICINE

## 2019-02-25 PROCEDURE — 25000132 ZZH RX MED GY IP 250 OP 250 PS 637: Performed by: INTERNAL MEDICINE

## 2019-02-25 PROCEDURE — 99232 SBSQ HOSP IP/OBS MODERATE 35: CPT | Performed by: INTERNAL MEDICINE

## 2019-02-25 PROCEDURE — 12000000 ZZH R&B MED SURG/OB

## 2019-02-25 PROCEDURE — 25000128 H RX IP 250 OP 636: Performed by: SURGERY

## 2019-02-25 RX ADMIN — AMITRIPTYLINE HYDROCHLORIDE 100 MG: 50 TABLET, FILM COATED ORAL at 21:20

## 2019-02-25 RX ADMIN — DULOXETINE HYDROCHLORIDE 60 MG: 30 CAPSULE, DELAYED RELEASE ORAL at 08:22

## 2019-02-25 RX ADMIN — OXYCODONE HYDROCHLORIDE 15 MG: 5 TABLET ORAL at 06:23

## 2019-02-25 RX ADMIN — BACLOFEN 10 MG: 10 TABLET ORAL at 21:20

## 2019-02-25 RX ADMIN — METOPROLOL SUCCINATE 50 MG: 50 TABLET, EXTENDED RELEASE ORAL at 08:23

## 2019-02-25 RX ADMIN — DULOXETINE HYDROCHLORIDE 60 MG: 30 CAPSULE, DELAYED RELEASE ORAL at 21:20

## 2019-02-25 RX ADMIN — LOSARTAN POTASSIUM 50 MG: 50 TABLET, FILM COATED ORAL at 08:23

## 2019-02-25 RX ADMIN — BACLOFEN 10 MG: 10 TABLET ORAL at 16:06

## 2019-02-25 RX ADMIN — ACETAMINOPHEN 650 MG: 325 TABLET, FILM COATED ORAL at 21:24

## 2019-02-25 RX ADMIN — GABAPENTIN 600 MG: 600 TABLET, FILM COATED ORAL at 21:20

## 2019-02-25 RX ADMIN — ATORVASTATIN CALCIUM 10 MG: 10 TABLET, FILM COATED ORAL at 08:23

## 2019-02-25 RX ADMIN — OXYCODONE HYDROCHLORIDE 15 MG: 5 TABLET ORAL at 18:04

## 2019-02-25 RX ADMIN — HEPARIN SODIUM 5000 UNITS: 5000 INJECTION, SOLUTION INTRAVENOUS; SUBCUTANEOUS at 08:23

## 2019-02-25 RX ADMIN — GABAPENTIN 600 MG: 600 TABLET, FILM COATED ORAL at 08:23

## 2019-02-25 RX ADMIN — GABAPENTIN 600 MG: 600 TABLET, FILM COATED ORAL at 16:06

## 2019-02-25 RX ADMIN — OXYCODONE HYDROCHLORIDE 15 MG: 5 TABLET ORAL at 11:59

## 2019-02-25 RX ADMIN — ASPIRIN 81 MG 162 MG: 81 TABLET ORAL at 08:23

## 2019-02-25 RX ADMIN — BACLOFEN 10 MG: 10 TABLET ORAL at 08:23

## 2019-02-25 RX ADMIN — HEPARIN SODIUM 5000 UNITS: 5000 INJECTION, SOLUTION INTRAVENOUS; SUBCUTANEOUS at 16:07

## 2019-02-25 ASSESSMENT — ACTIVITIES OF DAILY LIVING (ADL)
ADLS_ACUITY_SCORE: 23
ADLS_ACUITY_SCORE: 25

## 2019-02-25 ASSESSMENT — MIFFLIN-ST. JEOR: SCORE: 1580.64

## 2019-02-25 NOTE — PLAN OF CARE
"RN SHIFT SUMMARY :  DX/STORY : admit of 1/30  from home with UTI, + E.Coli. Resp. Failure ( extubated 2 weeks ago-2/8 ) transferred to [facility] 2/10  HX : DM 2, MS- w/c bound at baseline , FX rt hip/rt femoral neck  --unknown date ( hx of fall )  -Was intubated with Ureteral stents procedure  and remained intubated  until 2/8   (Acute renal failure with left  obstructive uropathy )-resolved.Status post  ureteral stent placement;    New Atrial flutter provoked by urosepsis. Status post DC cardioversion 1/31/2019  Non-Hodgkin's lymphoma .Hypertension, Bilateral inguinal wounds  LABS/PROTOCOLS : none today - just glucs . K+  Mon. am  TELE : no   ASSESS : a/o, has pain in rt hip/back- takes PO oxy q 6 hrs. Goal of 2-4 & rates it 7-8  with activity .Sl. Discoloration of L/E- has venous Stasis looking legs, numb hands/feet at baseline . Yeasty groin care done- improving . Large BM on commode . Up to recliner & commode x2 today.  Pure Wic in place when in bed. On Pullsate Mattress  TEACHING : discussed POC with pt.   PLAN : at baseline is  ( spouse -\"Fernando') & from [place].   D/C goal: awaiting TCU in [facility] . ORTHO CONSULTED for rt hip fx- no surgery now-may wt. Bear as in past ( just does pivots )  F/U   With ortho 3/15 . OT/PT/SS consulting --awaiting insurance approval.  For TCU.     "

## 2019-02-25 NOTE — PLAN OF CARE
Orientation: Alert and oriented x4. Awake, alert, cooperative, no apparent distress.  Sitting in chair   VSS. 90 -91% on RA. Afebrile.  LS: diminished throughout, No wheezing  GI: Passing gas. No BM. Denies N/V.  Normal bowel sounds, soft, non-distended, non-tender  : purwick in place, Adequate urine output. Clear yellow.   Skin: bruising noted, wound to inner groin area, nonblanchable redness, barrier cream applied, open to air, Skin otherwise intact. No rashes, no cyanosis, dry to touch  Activity: assist of 2 w/mechanical lift, PT here to see pt today. Up in chair for lunch today  Pain: 7/10 R thigh pain. Scheduled 15mg oxy given.   DC Plan: Continue with current cares. Waiting on [id number] company to Ok TCU placement

## 2019-02-25 NOTE — PLAN OF CARE
A/Ox4, VSS, Mech lift Ax2, LS clear on RA 90-91%, scheduled oxy Q6H for pain - hip/knee pain 6/10,  and 105, puriwick positional, pulsate matress and Q2H repositioning, Regular diet, no PIV access, awaiting TCU placement, continue with plan of care.

## 2019-02-25 NOTE — PLAN OF CARE
Discharge Planner PT   Patient plan for discharge: TCU  Current status: PT - Pt Performed seated thera exs to B LEs x 10 - 15 reps: AP (L)  and heel raises (R), LAQs, marching (L) AAROM on the (R), hip add/abd, glut sets and quad sets (L) AAROM  into extension (R).   Barriers to return to prior living situation:  Barriers to return to prior living situation: needing lift for transfers at this time  Recommendations for discharge: TCU per plan established by the PT.   Rationale for recommendations: Pt would benefit from continued therapy to improve tolerance to transfers back to baseline.      Entered by: Velvet Lozada 02/25/2019 12:28 PM

## 2019-02-25 NOTE — PROGRESS NOTES
[facility]  Hospitalist Progress Note    Drake Greco MD     Reason for Stay (Diagnosis): Sepsis, UTI, nephrolithiasis respiratory failure         Assessment and Plan:      Summary of Stay:   Amanda Richardson is a 53 year old female with past medical history significant for advanced MS, diabetes mellitus, hypertension, history of T-cell lymphoma status post treatment, chronically elevated WBC count, history of recurrent UTIs who presented with altered mental status increased weakness and somnolence.  Evaluation in the emergency room showed significant leukocytosis, hypotension, acute renal failure, obstructing left kidney stone with moderate hydronephrosis and respiratory failure with acidosis with hypercapnia.  She was admitted was admitted on 1/30/2019 with septic shock.  Patient received IV antibiotics and fluid resuscitation in the emergency room and underwent emergent cystoscopy and left retrograde pyelogram with stent placement on 1/30/2019.  CT also showed closed fracture of right femur.  She was initially placed on BiPAP for respiratory failure but perioperatively was intubated after cystoscopy was ureteral stent placement and was admitted to ICU for further evaluation.     1/31/2019 patient developed wide-complex tachycardia appeared to be SVT failed to respond to vasovagal maneuvers.  Adenosine was given, which showed rhythm to be atrial flutter she received cardioversion and was started on amiodarone.  Angiotensin II drip was initiated briefly for hypotension.  She is now off pressors as well as sedative meds.      Patient improved and extubated successfully on 2/8.  She remained stable, tolerated oral intake, transferred to medical floor medical floor on 2/10/19.  Continue to improve, completed his treatments, and has been ready for discharge for the last 5 days, and awaiting placement to TCU.  PICC line and a Bobo catheter removed.    Problem List/Assessment and Plan:   1. Septic  shock likely due to urinary tract source: Resolved - (altered mental status, acute renal failure, hypotension, leukocytosis, acidosis, respiratory failure).  She received aggressive fluid resuscitation along with initiation of pressors prior to being taken to the operating room.  She now is stable and shock is fully resolved.  -CT scan on admission showed obstructing stone with hydronephrosis and acute renal failure  -Status post cystoscopy and stent placement 1/30/2019.  Per report patient did have purulent drainage noted during cystoscopy.  -started zosyn to cover for E. Coli isolate, then narrowed to ceftriaxone based on sensitivities. Completed 14-day course on 2/13.  -Blood and urine cultures were positive for E coli     2. Acute hypercapnic respiratory failure: Resolved -she is stable no more hypoxic, related to volume overload from resolved sepsis and associated necessary management with severe deconditioning and atelectasis.  -Likely multifactorial from sepsis and chronic narcotic use  -Was intubated perioperatively and remained intubated following the procedure until 2/8  -Has been diuresed.  Serum bicarbonate going up, likely secondary to diuresis.  Obtain VBG tomorrow to make sure not developing hypercapnia again along with serum bicarb and holding diuretics     3. Acute renal failure with left hydronephrosis: Resolved and now at baseline  -Secondary to obstructive uropathy.  Creatinine on admission 2.25, now with creatinine 0.6 -0.7  -Status post a ureteral stent placement; anticipate outpatient follow-up for definitive treatment of the stone and removal of the stent.    4. New Atrial flutter provoked by urosepsis.  Stable without recurrence.  -Status post DC cardioversion 1/31/2019  -She was evaluated by cardiologist  -Cardiac echo when compared to prior study showed minimal deterioration of left ventricular systolic function.  EF 50-55%  -No anticoagulation for now per cardiology patient was in atrial  "flutter for less than 8 hours  -Switched to oral amiodarone for a total of 2 weeks which she has now completed therapy with  -Continue metoprolol XL 50 mg daily     5. Right femoral neck fracture  -Incidental finding on CT.  Likely present for quite some time.  -Patient has MS and at baseline is able to pivot herself  -At this time, pt notes that the right hip is painful with vigorous movement.  -Orthopedic surgery was consulted.  No immediate intervention recommended at this time, defer to their expertise re: ongoing management/weight bearing etc.     6. DM II\"borderline\"  -At baseline on metformin will hold for now  -Sliding scale insulin     7. MS with associated chronic pain  -Prior to admission on amitriptyline, baclofen, duloxetine, gabapentin and oxycodone  -PT evaluation     8. Leukocytosis: Improving  -Patient has history of T-cell non-Hodgkin's lymphoma status post treatment with baseline elevated white cell count (mid-teens)     9. H/o Hypertension  -Resumed losartan and hydrochlorothiazide (not quite at pre-hospitalization doses).  Serum bicarbonate going up and 5 L urine output yesterday so will stop HCTZ for now     10. Bilateral inguinal wounds  -Managed with topical wound cares     11. Constipation: Resolved.  Continue laxatives needed.    12. Anemia: Hemoglobin 9-10 range while here.  Normocytic.  Suspect secondary to sepsis and renal failure.  Should improve in time.  No GI blood loss noted.    DVT Prophylaxis: Heparin SQ  Code Status: Full Code  FEN: Regular diet  Discharge Dispo: Social work working on finding TCU.  -- care plan discussed with care coordinators     Estimated Disch Date / # of Days until Disch: When placement is available for the patient        Interval History (Subjective):      Patient is seen and examined by me today and medical record reviewed.Overnight events noted and care discussed with nursing staff.    doing well; no cp, sob, n/v/d, or abd pain.                            " " Physical Exam:      Last Vital Signs:  /65 (BP Location: Left arm)   Pulse 79   Temp 98.3  F (36.8  C) (Oral)   Resp 16   Ht 1.626 m (5' 4\")   Wt 100.1 kg (220 lb 9.6 oz)   LMP 12/11/2011   SpO2 90%   BMI 37.87 kg/m      Intake/Output Summary (Last 24 hours) reviewed    Constitutional: Awake, NAD.  Eyes: sclera white   HEENT:   MMM  Respiratory: no respiratory distress, lungs cta bilaterally, no crackles or wheeze  Cardiovascular: RRR.  No murmur   GI: non-tender, not distended, bowel sounds present  Genitourinary: Bobo catheter in place with yellow urine in bag  Skin: Chronic venous stasis changes of lower legs.  Musculoskeletal/extremities: Right arm PICC in place.  Wrinkling of skin with trace bilateral lower extremity edema remaining  Neurologic: A&O   Psychiatric: calm, cooperative          Medications:        Current Facility-Administered Medications   Medication     acetaminophen (TYLENOL) tablet 650 mg     amitriptyline (ELAVIL) tablet 100 mg     aspirin (ASA) chewable tablet 162 mg     atorvastatin (LIPITOR) tablet 10 mg     baclofen (LIORESAL) tablet 10 mg     bisacodyl (DULCOLAX) Suppository 10 mg     glucose gel 15-30 g    Or     dextrose 50 % injection 25-50 mL    Or     glucagon injection 1 mg     docusate sodium (COLACE) capsule 100 mg     DULoxetine (CYMBALTA) EC capsule 60 mg     gabapentin (NEURONTIN) tablet 600 mg     heparin lock flush 10 UNIT/ML injection 5-10 mL     heparin sodium injection 5,000 Units     insulin aspart (NovoLOG) inj (RAPID ACTING)     insulin aspart (NovoLOG) inj (RAPID ACTING)     ipratropium - albuterol 0.5 mg/2.5 mg/3 mL (DUONEB) neb solution 3 mL     lidocaine 1 % 0.5-5 mL     lidocaine 1 % 1 mL     losartan (COZAAR) tablet 50 mg     magnesium sulfate 2 g in NS intermittent infusion (PharMEDium or FV Cmpd)     magnesium sulfate 4 g in 100 mL sterile water (premade)     metoprolol succinate ER (TOPROL-XL) 24 hr tablet 50 mg     naloxone (NARCAN) injection " 0.1-0.4 mg     ondansetron (ZOFRAN-ODT) ODT tab 4 mg    Or     ondansetron (ZOFRAN) injection 4 mg     oxyCODONE (ROXICODONE) tablet 15 mg     pink lady enema (COMPOUNDED: docusate, magnesium citrate, mineral oil, sodium phosphate)     polyethylene glycol (MIRALAX/GLYCOLAX) Packet 17 g     polyethylene glycol (MIRALAX/GLYCOLAX) Packet 17 g     senna-docusate (SENOKOT-S/PERICOLACE) 8.6-50 MG per tablet 3 tablet     sodium chloride (OCEAN) 0.65 % nasal spray 1-2 spray     sodium chloride (PF) 0.9% PF flush 10-20 mL     sodium chloride (PF) 0.9% PF flush 10-20 mL     sodium chloride (PF) 0.9% PF flush 3 mL          Data:      All new lab and imaging data was reviewed.   Labs:  Recent Labs   Lab 02/24/19  0640 02/21/19  0634 02/19/19  0631   WBC  --   --  11.7*   HGB  --   --  10.1*   HCT  --   --  34.0*   MCV  --   --  88    376 432     Recent Labs   Lab 02/25/19  1151 02/25/19  0830 02/25/19  0155 02/24/19  2106 02/24/19  1734  02/19/19  0631   GLC  --   --   --   --   --   --  91   * 100* 105* 128* 106*   < >  --     < > = values in this interval not displayed.      Imaging:   None today      Drake Greco MD

## 2019-02-25 NOTE — PROGRESS NOTES
D:  Per record review, pt's discharge recommendation is TCU.  Referrals were sent.    I:  Sw spoke with [facility] [phone] who said that they resubmitted the paperwork for the prior auth to the pt's insurance.  They are waiting to hear back, they will update sw when they hear from the pt's insurance.    A/P:  Sw will continue with discharge planning and will be available as needed until discharge.

## 2019-02-26 ENCOUNTER — APPOINTMENT (OUTPATIENT)
Dept: OCCUPATIONAL THERAPY | Facility: CLINIC | Age: 54
DRG: 853 | End: 2019-02-26
Payer: COMMERCIAL

## 2019-02-26 LAB
GLUCOSE BLDC GLUCOMTR-MCNC: 105 MG/DL (ref 70–99)
GLUCOSE BLDC GLUCOMTR-MCNC: 109 MG/DL (ref 70–99)
GLUCOSE BLDC GLUCOMTR-MCNC: 114 MG/DL (ref 70–99)
GLUCOSE BLDC GLUCOMTR-MCNC: 73 MG/DL (ref 70–99)
GLUCOSE BLDC GLUCOMTR-MCNC: 92 MG/DL (ref 70–99)

## 2019-02-26 PROCEDURE — 25000132 ZZH RX MED GY IP 250 OP 250 PS 637: Performed by: HOSPITALIST

## 2019-02-26 PROCEDURE — 99232 SBSQ HOSP IP/OBS MODERATE 35: CPT | Performed by: INTERNAL MEDICINE

## 2019-02-26 PROCEDURE — 12000000 ZZH R&B MED SURG/OB

## 2019-02-26 PROCEDURE — 97110 THERAPEUTIC EXERCISES: CPT | Mod: GO | Performed by: OCCUPATIONAL THERAPIST

## 2019-02-26 PROCEDURE — 99207 ZZC CDG-MDM COMPONENT: MEETS LOW - DOWN CODED: CPT | Performed by: INTERNAL MEDICINE

## 2019-02-26 PROCEDURE — 25000132 ZZH RX MED GY IP 250 OP 250 PS 637: Performed by: INTERNAL MEDICINE

## 2019-02-26 PROCEDURE — 25000128 H RX IP 250 OP 636: Performed by: SURGERY

## 2019-02-26 PROCEDURE — 00000146 ZZHCL STATISTIC GLUCOSE BY METER IP

## 2019-02-26 RX ADMIN — METOPROLOL SUCCINATE 50 MG: 50 TABLET, EXTENDED RELEASE ORAL at 07:53

## 2019-02-26 RX ADMIN — DULOXETINE HYDROCHLORIDE 60 MG: 30 CAPSULE, DELAYED RELEASE ORAL at 21:28

## 2019-02-26 RX ADMIN — ASPIRIN 81 MG 162 MG: 81 TABLET ORAL at 07:54

## 2019-02-26 RX ADMIN — SENNOSIDES AND DOCUSATE SODIUM 3 TABLET: 8.6; 5 TABLET ORAL at 07:52

## 2019-02-26 RX ADMIN — DULOXETINE HYDROCHLORIDE 60 MG: 30 CAPSULE, DELAYED RELEASE ORAL at 07:51

## 2019-02-26 RX ADMIN — OXYCODONE HYDROCHLORIDE 15 MG: 5 TABLET ORAL at 06:39

## 2019-02-26 RX ADMIN — HEPARIN SODIUM 5000 UNITS: 5000 INJECTION, SOLUTION INTRAVENOUS; SUBCUTANEOUS at 16:58

## 2019-02-26 RX ADMIN — HEPARIN SODIUM 5000 UNITS: 5000 INJECTION, SOLUTION INTRAVENOUS; SUBCUTANEOUS at 07:51

## 2019-02-26 RX ADMIN — OXYCODONE HYDROCHLORIDE 15 MG: 5 TABLET ORAL at 12:07

## 2019-02-26 RX ADMIN — GABAPENTIN 600 MG: 600 TABLET, FILM COATED ORAL at 21:28

## 2019-02-26 RX ADMIN — HEPARIN SODIUM 5000 UNITS: 5000 INJECTION, SOLUTION INTRAVENOUS; SUBCUTANEOUS at 00:22

## 2019-02-26 RX ADMIN — GABAPENTIN 600 MG: 600 TABLET, FILM COATED ORAL at 07:53

## 2019-02-26 RX ADMIN — ATORVASTATIN CALCIUM 10 MG: 10 TABLET, FILM COATED ORAL at 07:54

## 2019-02-26 RX ADMIN — LOSARTAN POTASSIUM 50 MG: 50 TABLET, FILM COATED ORAL at 07:53

## 2019-02-26 RX ADMIN — OXYCODONE HYDROCHLORIDE 15 MG: 5 TABLET ORAL at 17:47

## 2019-02-26 RX ADMIN — OXYCODONE HYDROCHLORIDE 15 MG: 5 TABLET ORAL at 00:22

## 2019-02-26 RX ADMIN — BACLOFEN 10 MG: 10 TABLET ORAL at 21:28

## 2019-02-26 RX ADMIN — BACLOFEN 10 MG: 10 TABLET ORAL at 16:58

## 2019-02-26 RX ADMIN — BACLOFEN 10 MG: 10 TABLET ORAL at 07:54

## 2019-02-26 RX ADMIN — AMITRIPTYLINE HYDROCHLORIDE 100 MG: 50 TABLET, FILM COATED ORAL at 21:28

## 2019-02-26 RX ADMIN — GABAPENTIN 600 MG: 600 TABLET, FILM COATED ORAL at 16:58

## 2019-02-26 ASSESSMENT — ACTIVITIES OF DAILY LIVING (ADL)
ADLS_ACUITY_SCORE: 25
ADLS_ACUITY_SCORE: 21
ADLS_ACUITY_SCORE: 21
ADLS_ACUITY_SCORE: 23
ADLS_ACUITY_SCORE: 25
ADLS_ACUITY_SCORE: 23

## 2019-02-26 ASSESSMENT — MIFFLIN-ST. JEOR: SCORE: 1586.08

## 2019-02-26 NOTE — PLAN OF CARE
Discharge Planner OT   Patient plan for discharge: TCU  Current status: patient participated in BUE HEP 9 exs x15 reps, which is increased over yesterday.  Patient reports B shoulder pain with increased activity in past few days, but manageable.    Barriers to return to prior living situation: Current level of assist for ADLs, lift assist for transfers, decreased strength and functional activity tolerance  Recommendations for discharge: TCU  Rationale for recommendations: Pt is significantly below baseline level of functioning in areas of ADLs and mobility tasks. Recommend ongoing skilled OT to maximize safety and indep through improved strength, balance, and functional activity tolerance.       Entered by: FRANK VAZQUEZ 02/26/2019 12:06 PM

## 2019-02-26 NOTE — PROGRESS NOTES
D:  Per record review, pt's discharge recommendation is TCU.  Referrals were sent and [facility] accepted the pt pending insurance auth.    I:  Estephania spoke with Jere at [facility] [phone] to follow-up on the progress of getting insurance auth for the pt.  Jere said that they are still waiting to hear back from the insurance company.    A/P:  Sw will continue with discharge planning and will be available as needed until discharge.

## 2019-02-26 NOTE — PLAN OF CARE
"RN SHIFT SUMMARY :  DX/STORY : admit of 1/30  from home with UTI, + E.Coli. Resp. Failure ( extubated 2 weeks ago-2/8 ) transferred to [facility] 2/10  HX : DM 2, MS- w/c bound at baseline , FX rt hip/rt femoral neck  --unknown date ( hx of fall )  -Was intubated with Ureteral stents procedure on 1/30 & remained intubated  until 2/8.   (Acute renal failure with left  obstructive uropathy )-resolved.  Status post  ureteral stent placement on 1/30 ;    New Atrial flutter provoked by urosepsis. Status post DC cardioversion 1/31/2019  Non-Hodgkin's lymphoma .Hypertension, Bilateral inguinal wounds  LABS/PROTOCOLS : none today - just glucs . K+  Mon. Am which was 4.3   TELE : no   ASSESS : a/o, has pain in rt hip/back- takes PO oxy q 6 hrs. Goal of 2-4 & rates it 7-8  with activity .Sl. Discoloration of L/E- has venous Stasis looking legs, numb hands/feet at baseline . Yeasty groin care done- improving .  Up to recliner  x2 today.  Pure Wic in place when in bed. On Pullsate Mattress  TEACHING : discussed POC with pt.   PLAN : at baseline is  ( spouse -\"Fernando') & from [place].   D/C goal: awaiting TCU in [facility] . ORTHO CONSULTED for rt hip fx- no surgery now-may wt. Bear as in past ( just does pivots )  F/U   With ortho 3/15 . OT/PT/SS consulting --awaiting insurance approval.  For TCU.     "

## 2019-02-26 NOTE — PROGRESS NOTES
[facility]  Hospitalist Progress Note    Drake Greco MD     Reason for Stay (Diagnosis): Sepsis, UTI, nephrolithiasis respiratory failure         Assessment and Plan:      Summary of Stay:   Amanda Richardson is a 53 year old female with past medical history significant for advanced MS, diabetes mellitus, hypertension, history of T-cell lymphoma status post treatment, chronically elevated WBC count, history of recurrent UTIs who presented with altered mental status increased weakness and somnolence.  Evaluation in the emergency room showed significant leukocytosis, hypotension, acute renal failure, obstructing left kidney stone with moderate hydronephrosis and respiratory failure with acidosis with hypercapnia.  She was admitted was admitted on 1/30/2019 with septic shock.  Patient received IV antibiotics and fluid resuscitation in the emergency room and underwent emergent cystoscopy and left retrograde pyelogram with stent placement on 1/30/2019.  CT also showed closed fracture of right femur.  She was initially placed on BiPAP for respiratory failure but perioperatively was intubated after cystoscopy was ureteral stent placement and was admitted to ICU for further evaluation.     1/31/2019 patient developed wide-complex tachycardia appeared to be SVT failed to respond to vasovagal maneuvers.  Adenosine was given, which showed rhythm to be atrial flutter she received cardioversion and was started on amiodarone.  Angiotensin II drip was initiated briefly for hypotension.  She is now off pressors as well as sedative meds.      Patient improved and extubated successfully on 2/8.  She remained stable, tolerated oral intake, transferred to medical floor medical floor on 2/10/19.  Continue to improve, completed his treatments, and has been ready for discharge for the last 5 days, and awaiting placement to TCU.  PICC line and a Bobo catheter removed.    Problem List/Assessment and Plan:   1. Septic  shock likely due to urinary tract source: Resolved - (altered mental status, acute renal failure, hypotension, leukocytosis, acidosis, respiratory failure).  She received aggressive fluid resuscitation along with initiation of pressors prior to being taken to the operating room.  She now is stable and shock is fully resolved.  -CT scan on admission showed obstructing stone with hydronephrosis and acute renal failure  -Status post cystoscopy and stent placement 1/30/2019.  Per report patient did have purulent drainage noted during cystoscopy.  -started zosyn to cover for E. Coli isolate, then narrowed to ceftriaxone based on sensitivities. Completed 14-day course on 2/13.  -Blood and urine cultures were positive for E coli     2. Acute hypercapnic respiratory failure: Resolved -she is stable no more hypoxic, related to volume overload from resolved sepsis and associated necessary management with severe deconditioning and atelectasis.  -Likely multifactorial from sepsis and chronic narcotic use  -Was intubated perioperatively and remained intubated following the procedure until 2/8  -Has been diuresed.  Serum bicarbonate going up, likely secondary to diuresis.  Obtain VBG tomorrow to make sure not developing hypercapnia again along with serum bicarb and holding diuretics     3. Acute renal failure with left hydronephrosis: Resolved and now at baseline  -Secondary to obstructive uropathy.  Creatinine on admission 2.25, now with creatinine 0.6 -0.7  -Status post a ureteral stent placement; anticipate outpatient follow-up for definitive treatment of the stone and removal of the stent.    4. New Atrial flutter provoked by urosepsis.  Stable without recurrence.  -Status post DC cardioversion 1/31/2019  -She was evaluated by cardiologist  -Cardiac echo when compared to prior study showed minimal deterioration of left ventricular systolic function.  EF 50-55%  -No anticoagulation for now per cardiology patient was in atrial  "flutter for less than 8 hours  -Switched to oral amiodarone for a total of 2 weeks which she has now completed therapy with  -Continue metoprolol XL 50 mg daily     5. Right femoral neck fracture  -Incidental finding on CT.  Likely present for quite some time.  -Patient has MS and at baseline is able to pivot herself  -At this time, pt notes that the right hip is painful with vigorous movement.  -Orthopedic surgery was consulted.  No immediate intervention recommended at this time, defer to their expertise re: ongoing management/weight bearing etc.     6. DM II\"borderline\"  -At baseline on metformin will hold for now  -Sliding scale insulin     7. MS with associated chronic pain  -Prior to admission on amitriptyline, baclofen, duloxetine, gabapentin and oxycodone  -PT evaluation     8. Leukocytosis: Improving  -Patient has history of T-cell non-Hodgkin's lymphoma status post treatment with baseline elevated white cell count (mid-teens)     9. H/o Hypertension  -Resumed losartan and hydrochlorothiazide (not quite at pre-hospitalization doses).  Serum bicarbonate going up and 5 L urine output yesterday so will stop HCTZ for now     10. Bilateral inguinal wounds  -Managed with topical wound cares     11. Constipation: Resolved.  Continue laxatives needed.    12. Anemia: Hemoglobin 9-10 range while here.  Normocytic.  Suspect secondary to sepsis and renal failure.  Should improve in time.  No GI blood loss noted.    DVT Prophylaxis: Heparin SQ  Code Status: Full Code  FEN: Regular diet  Discharge Dispo: Social work working on finding TCU.  -- care plan discussed with care coordinators     Estimated Disch Date / # of Days until Disch: When placement is available for the patient        Interval History (Subjective):      Patient is seen and examined by me today and medical record reviewed.Overnight events noted and care discussed with nursing staff.  No new complaints.  Social service contacted and discharge planning is " "in progress.                               Physical Exam:      Last Vital Signs:  /72 (BP Location: Left arm)   Pulse 80   Temp 98.8  F (37.1  C) (Oral)   Resp 18   Ht 1.626 m (5' 4\")   Wt 100.6 kg (221 lb 12.8 oz)   LMP 12/11/2011   SpO2 92%   BMI 38.07 kg/m      Intake/Output Summary (Last 24 hours) reviewed    Constitutional: Awake, NAD.  Eyes: sclera white   HEENT:   MMM  Respiratory: no respiratory distress, lungs cta bilaterally, no crackles or wheeze  Cardiovascular: RRR.  No murmur   GI: non-tender, not distended, bowel sounds present  Genitourinary: Bobo catheter in place with yellow urine in bag  Skin: Chronic venous stasis changes of lower legs.  Musculoskeletal/extremities: Right arm PICC in place.  Wrinkling of skin with trace bilateral lower extremity edema remaining  Neurologic: A&O   Psychiatric: calm, cooperative          Medications:        Current Facility-Administered Medications   Medication     acetaminophen (TYLENOL) tablet 650 mg     amitriptyline (ELAVIL) tablet 100 mg     aspirin (ASA) chewable tablet 162 mg     atorvastatin (LIPITOR) tablet 10 mg     baclofen (LIORESAL) tablet 10 mg     bisacodyl (DULCOLAX) Suppository 10 mg     glucose gel 15-30 g    Or     dextrose 50 % injection 25-50 mL    Or     glucagon injection 1 mg     docusate sodium (COLACE) capsule 100 mg     DULoxetine (CYMBALTA) EC capsule 60 mg     gabapentin (NEURONTIN) tablet 600 mg     heparin lock flush 10 UNIT/ML injection 5-10 mL     heparin sodium injection 5,000 Units     insulin aspart (NovoLOG) inj (RAPID ACTING)     insulin aspart (NovoLOG) inj (RAPID ACTING)     ipratropium - albuterol 0.5 mg/2.5 mg/3 mL (DUONEB) neb solution 3 mL     losartan (COZAAR) tablet 50 mg     magnesium sulfate 2 g in NS intermittent infusion (PharMEDium or FV Cmpd)     magnesium sulfate 4 g in 100 mL sterile water (premade)     metoprolol succinate ER (TOPROL-XL) 24 hr tablet 50 mg     naloxone (NARCAN) injection 0.1-0.4 " mg     ondansetron (ZOFRAN-ODT) ODT tab 4 mg    Or     ondansetron (ZOFRAN) injection 4 mg     oxyCODONE (ROXICODONE) tablet 15 mg     polyethylene glycol (MIRALAX/GLYCOLAX) Packet 17 g     polyethylene glycol (MIRALAX/GLYCOLAX) Packet 17 g     senna-docusate (SENOKOT-S/PERICOLACE) 8.6-50 MG per tablet 3 tablet     sodium chloride (OCEAN) 0.65 % nasal spray 1-2 spray          Data:      All new lab and imaging data was reviewed.   Labs:  Recent Labs   Lab 02/24/19  0640 02/21/19  0634    376     Recent Labs   Lab 02/26/19  1154 02/26/19  0720 02/26/19  0220 02/25/19  2118 02/25/19  1710   BGM 92 73 109* 120* 108*      Imaging:   None today      Drake Greco MD

## 2019-02-26 NOTE — PLAN OF CARE
A/Ox4, VSS, Mech lift Ax2, LS clear on RA 90-91%, scheduled oxy Q6H for pain - hip/knee pain 6-7/10, , puriwick positional with 1200 out this shift, pulsate matress and Q2H repositioning, Regular diet, no PIV access, awaiting TCU placement, continue with plan of care.

## 2019-02-27 VITALS
DIASTOLIC BLOOD PRESSURE: 59 MMHG | RESPIRATION RATE: 20 BRPM | BODY MASS INDEX: 37.86 KG/M2 | WEIGHT: 221.78 LBS | HEART RATE: 80 BPM | HEIGHT: 64 IN | OXYGEN SATURATION: 90 % | SYSTOLIC BLOOD PRESSURE: 139 MMHG | TEMPERATURE: 96.2 F

## 2019-02-27 LAB
GLUCOSE BLDC GLUCOMTR-MCNC: 101 MG/DL (ref 70–99)
GLUCOSE BLDC GLUCOMTR-MCNC: 121 MG/DL (ref 70–99)
GLUCOSE BLDC GLUCOMTR-MCNC: 99 MG/DL (ref 70–99)
PLATELET # BLD AUTO: 281 10E9/L (ref 150–450)

## 2019-02-27 PROCEDURE — G0463 HOSPITAL OUTPT CLINIC VISIT: HCPCS

## 2019-02-27 PROCEDURE — 36415 COLL VENOUS BLD VENIPUNCTURE: CPT | Performed by: HOSPITALIST

## 2019-02-27 PROCEDURE — 00000146 ZZHCL STATISTIC GLUCOSE BY METER IP

## 2019-02-27 PROCEDURE — 25000132 ZZH RX MED GY IP 250 OP 250 PS 637: Performed by: INTERNAL MEDICINE

## 2019-02-27 PROCEDURE — 25000128 H RX IP 250 OP 636: Performed by: SURGERY

## 2019-02-27 PROCEDURE — 85049 AUTOMATED PLATELET COUNT: CPT | Performed by: HOSPITALIST

## 2019-02-27 RX ORDER — LOSARTAN POTASSIUM 50 MG/1
50 TABLET ORAL DAILY
Qty: 30 TABLET | Refills: 0 | DISCHARGE
Start: 2019-02-27 | End: 2019-06-10

## 2019-02-27 RX ADMIN — SALINE NASAL SPRAY 2 SPRAY: 1.5 SOLUTION NASAL at 07:31

## 2019-02-27 RX ADMIN — OXYCODONE HYDROCHLORIDE 15 MG: 5 TABLET ORAL at 00:01

## 2019-02-27 RX ADMIN — ATORVASTATIN CALCIUM 10 MG: 10 TABLET, FILM COATED ORAL at 07:26

## 2019-02-27 RX ADMIN — ACETAMINOPHEN 650 MG: 325 TABLET, FILM COATED ORAL at 09:05

## 2019-02-27 RX ADMIN — GABAPENTIN 600 MG: 600 TABLET, FILM COATED ORAL at 15:12

## 2019-02-27 RX ADMIN — OXYCODONE HYDROCHLORIDE 15 MG: 5 TABLET ORAL at 11:46

## 2019-02-27 RX ADMIN — HEPARIN SODIUM 5000 UNITS: 5000 INJECTION, SOLUTION INTRAVENOUS; SUBCUTANEOUS at 15:12

## 2019-02-27 RX ADMIN — GABAPENTIN 600 MG: 600 TABLET, FILM COATED ORAL at 07:26

## 2019-02-27 RX ADMIN — HEPARIN SODIUM 5000 UNITS: 5000 INJECTION, SOLUTION INTRAVENOUS; SUBCUTANEOUS at 07:27

## 2019-02-27 RX ADMIN — LOSARTAN POTASSIUM 50 MG: 50 TABLET, FILM COATED ORAL at 07:26

## 2019-02-27 RX ADMIN — BACLOFEN 10 MG: 10 TABLET ORAL at 07:26

## 2019-02-27 RX ADMIN — ASPIRIN 81 MG 162 MG: 81 TABLET ORAL at 07:26

## 2019-02-27 RX ADMIN — BACLOFEN 10 MG: 10 TABLET ORAL at 15:12

## 2019-02-27 RX ADMIN — METOPROLOL SUCCINATE 50 MG: 50 TABLET, EXTENDED RELEASE ORAL at 07:27

## 2019-02-27 RX ADMIN — HEPARIN SODIUM 5000 UNITS: 5000 INJECTION, SOLUTION INTRAVENOUS; SUBCUTANEOUS at 00:01

## 2019-02-27 RX ADMIN — DULOXETINE HYDROCHLORIDE 60 MG: 30 CAPSULE, DELAYED RELEASE ORAL at 07:26

## 2019-02-27 RX ADMIN — OXYCODONE HYDROCHLORIDE 15 MG: 5 TABLET ORAL at 06:22

## 2019-02-27 ASSESSMENT — ACTIVITIES OF DAILY LIVING (ADL)
ADLS_ACUITY_SCORE: 28
ADLS_ACUITY_SCORE: 29
ADLS_ACUITY_SCORE: 28

## 2019-02-27 ASSESSMENT — MIFFLIN-ST. JEOR: SCORE: 1585.99

## 2019-02-27 NOTE — PLAN OF CARE
OT: Attempted session, pt and RN preparing pt for transfer to TCU    Occupational Therapy Discharge Summary    Reason for therapy discharge:    Discharged to transitional care facility.    Progress towards therapy goal(s). See goals on Care Plan in [facility] electronic health record for goal details.  Goals partially met.  Barriers to achieving goals:   discharge from facility.    Therapy recommendation(s):    Continued therapy is recommended.  Rationale/Recommendations:  TCU recommended.

## 2019-02-27 NOTE — PLAN OF CARE
"RN SHIFT SUMMARY :  DX/STORY : admit of 1/30  from home with UTI, + E.Coli. Resp. Failure ( extubated 2 weeks ago-2/8 ) transferred to [facility] 2/10  HX : DM 2, MS- w/c bound at baseline , FX rt hip/rt femoral neck  --unknown date ( hx of fall )  -Was intubated with Ureteral stents procedure on 1/30 & remained intubated  until 2/8.   (Acute renal failure with left  obstructive uropathy )-resolved.  Status post  ureteral stent placement on 1/30 ;    New Atrial flutter provoked by urosepsis. Status post DC cardioversion 1/31/2019  Non-Hodgkin's lymphoma .Hypertension, Bilateral inguinal wounds  LABS/PROTOCOLS : none today - just glucs . K+  Mon. Am which was 4.3   TELE : no   ASSESS : a/o,very pleasant,  has pain in rt hip/back- takes PO oxy q 6 hrs. Goal of 2-4 & rates it 7-8  with activity .Sl. Discoloration of L/E- has venous Stasis looking legs, numb hands/feet at baseline . Yeasty groin care done- improving .  Up to recliner  x2 today.& had large ,soft BM on commode   Pure Wic in place when in bed. On Pullsate Mattress  TEACHING : discussed POC with pt.   PLAN : at baseline is  ( spouse -\"Fernando') & from [place].   D/C goal: awaiting TCU in [facility] . ORTHO CONSULTED for rt hip fx- no surgery now-may wt. Bear as in past ( just does pivots )  F/U   With ortho 3/15 . OT/PT/SS consulting --awaiting  insurance approval.  For TCU.     "

## 2019-02-27 NOTE — PROGRESS NOTES
"[facility]  WO Nurse Inpatient Wound Assessment     Assessment of wound(s) on pt's:   Bilateral groin folds/ Buttocks        Data:   Patient History:      per MD note(s):  53 year old woman with advanced MS (able to pivot) who resides at home with her .  She was brought to the ED by EMS due to weakness and apparent increased somnolence. He recognized that she probably had a UTI, \"because this is how she gets with those\".    See MD note for complete history.      Moisture Management:  Purwick Catheter    Orders Placed This Encounter      Regular Diet Adult      Advance Diet as Tolerated       Issac Assessment and sub scores:   Issac Risk Assessment Issac Risk Assessment  Issac Risk Assessment    Sensory Perception: 3-->slightly limited    Moisture: 3-->occasionally moist   Activity: 2-->chairfast     Mobility: 2-->very limited   Nutrition: 3-->adequate   Friction and Shear: 2-->potential problem  Issac Score: 15       Mattress:  Standard , Pulsate    Wound Assessment (location ):   Bilateral groin skin folds    Appear with out erythema or yeast, wounds are small, partial thickness and almost healed    Pressure Wound Assessment:   Bilateral buttocks- fleshy area    Pt. Sitting up in chair and unable to assess, nursing will assess when she gets back to bed and notify WOC if appears worse.           Intervention:     Patient's chart evaluated.      Wound(s) was assessed    Wound Care: was done by RN    Orders  In place    Supplies  At bedside    Discussed plan of care with Nurse and pt          Assessment:       Skin condition is greatly improved. No yeast.   Healing chronic hydradenitis skin condition with current treatment.   Plan to continue with Triad alone, please DO NOT use any Moist gauze or excessive paste.  Bilateral buttocks improved blanchable skin.          Plan:     Nursing to notify the Provider(s) and re-consult the WOC Nurse if wound(s) deteriorate(s) or if the wound care plan " needs reevaluation.    Plan of care for wound located on bilateral groin: Daily and PRN1. Wash wounds with wound spray and pat dry    2. Wash periwound with Tereza and soft cloth    3. Apply very small amount of Triad paste to OPEN wounds    6. DO NOT USE MOIST GAUZE to wounds        Wound care to Buttocks:BID and PRN    1. Gently clean skin with Tereza. Apply Light layer of Triad paste or Critic aid paste to buttocks.     2. Avoid Mepilex    3. Pressure Prevention: turn side to side, avoid pressure points, heel lift boots, Sween to dry skin    WOC Nurse will return:    Weekly/PRN

## 2019-02-27 NOTE — PROVIDER NOTIFICATION
Discharge Planner   Discharge Plans in progress: Pt will discharge to [facility] today via HE wheelchair at 1530.  Pt is aware and acknowledges the costs of transport.  Orders will be sent to the facility.     02/27/19 1446   Final Resources   Resources List Transitional Care   Transitional Care [facility] [phone]   PAS Number [id number]     Barriers to discharge plan: None  Follow up plan: Sw will continue to be available as needed until discharge.       Entered by: Callie Saldaña 02/27/2019 3:14 PM

## 2019-02-27 NOTE — PLAN OF CARE
Pt discharged to TCU with HE wheelchair transport.  Discharge instructions reviewed with pt, she verbalized understanding and all questions were answered.  Pt discharged with all personal belongings.  Discharge scripts sent in packet with transport tech.

## 2019-02-27 NOTE — PLAN OF CARE
VSS, A/O. Lift. Ls-dim/coarse. Pt on scheduled oxy. Tylenol given for knee pain today with some relief.  Up in chair for meals.  BG checks.  Wound care per POC.  Discharge pending insurance authorization, accepted at [facility].

## 2019-02-27 NOTE — PLAN OF CARE
Vital signs:  Temp: 98.7  F (37.1  C) Temp src: Oral BP: 137/57 Pulse: 80 Heart Rate: 81 Resp: 18 SpO2: 92 %    A/Ox4, VSS, Mech lift Ax2, LS clear scheduled oxycodone 15 mg every  Q6H for pain faisal wick n place and changed at 11pm. Repositioned every 2 hrs. On pressure relief mattress. Regular diet, no PIV access, awaiting  insurance approval for TCU placement, continue with plan of care.

## 2019-02-27 NOTE — PROGRESS NOTES
Your information has been submitted on February 27th, 2019 at 03:12:09 PM [facility]. The confirmation number is [id number]

## 2019-02-28 ENCOUNTER — TELEPHONE (OUTPATIENT)
Dept: FAMILY MEDICINE | Facility: CLINIC | Age: 54
End: 2019-02-28

## 2019-02-28 VITALS
DIASTOLIC BLOOD PRESSURE: 65 MMHG | HEART RATE: 72 BPM | WEIGHT: 249.6 LBS | BODY MASS INDEX: 37.83 KG/M2 | RESPIRATION RATE: 18 BRPM | OXYGEN SATURATION: 92 % | HEIGHT: 68 IN | TEMPERATURE: 98.5 F | SYSTOLIC BLOOD PRESSURE: 128 MMHG

## 2019-02-28 DIAGNOSIS — Z76.89 HEALTH CARE HOME: ICD-10-CM

## 2019-02-28 DIAGNOSIS — C85.81 OTHER SPECIFIED TYPE OF NON-HODGKIN LYMPHOMA OF HEAD (H): ICD-10-CM

## 2019-02-28 DIAGNOSIS — J96.02 ACUTE HYPERCAPNIC RESPIRATORY FAILURE (H): Primary | ICD-10-CM

## 2019-02-28 DIAGNOSIS — C85.90 T-CELL LYMPHOMA (H): ICD-10-CM

## 2019-02-28 DIAGNOSIS — D72.829 LEUKOCYTOSIS, UNSPECIFIED TYPE: ICD-10-CM

## 2019-02-28 DIAGNOSIS — G35 MS (MULTIPLE SCLEROSIS) (H): ICD-10-CM

## 2019-02-28 RX ORDER — DOCUSATE SODIUM 100 MG/1
100 CAPSULE, LIQUID FILLED ORAL 2 TIMES DAILY PRN
COMMUNITY
End: 2019-06-10

## 2019-02-28 ASSESSMENT — MIFFLIN-ST. JEOR: SCORE: 1775.68

## 2019-02-28 NOTE — PLAN OF CARE
Physical Therapy Discharge Summary    Reason for therapy discharge:    Discharged to transitional care facility.    Progress towards therapy goal(s). See goals on Care Plan in [facility] electronic health record for goal details.  Goals not met.  Barriers to achieving goals:   discharge from facility.    Therapy recommendation(s):    Continued therapy is recommended.  Rationale/Recommendations:  Decreased independence with and tolerance for functional mobility.

## 2019-02-28 NOTE — TELEPHONE ENCOUNTER
Referral pended     Attempt #1  Called spouse, Fernando, @ # below - Left a non-detailed message to call back and speak with any triage nurse.    Ivanna Kelly RN  [facility]

## 2019-02-28 NOTE — TELEPHONE ENCOUNTER
Spoke with DEQUAN Molina - stated that patient will need to work with the  on site at the TCU ([facility]) to find new placement that will meet her needs.   Unfortunately, since patient is already admitted to a TCU, our /care coordinator does not directly place patient's in new facilities, needs to be done by facility patient is at (this is part of the services that patient receives).     Tracy stated that she would be happy to discuss this with them but it will not change what she can do for the patient    Referral canceled    Informed patient's spouse, Fernando, of Tracy's message above - stated an understanding and agreed with plan.    Routing to PCP as an FYI    Ivanna Kelly RN  [facility]

## 2019-02-28 NOTE — PROGRESS NOTES
Transition Communication Hand-off for Care Transitions to Next Level of Care Provider    Name: Amanda Richardson  : 1965  MRN #: [id number]  Primary Care Provider: Julius Hassan  Primary Care MD Name: Julius Hassan  Primary Clinic: [address] [place] [id number]  Primary Care Clinic Name: [facility] [place] 2]  Reason for Hospitalization:  Altered mental status [R41.82]  JULIANNE (acute kidney injury) (H) [N17.9]  Closed fracture of neck of right femur, initial encounter (H) [S72.001A]  Hydronephrosis with urinary obstruction due to ureteral calculus [N13.2]  Septic shock (H) [A41.9, R65.21]  Admit Date/Time: 2019 12:06 PM  Discharge Date: 19  Payor Source: Payor: [employer] / Plan: [employer] MEDICARE ADVANTAGE / Product Type: Medicare /     Readmission Assessment Measure (JOAQUIN) Risk Score/category: AVERAGE         Reason for Communication Hand-off Referral: Fragility    Discharge Plan:       Concern for non-adherence with plan of care:   NO  Discharge Needs Assessment:  Needs      Most Recent Value   Equipment Currently Used at Home  commode, grab bar, tub/shower, lift device, shower chair, wheelchair, manual   Transitional Care  [facility] [phone]   PAS Number  -- [[id number]]          Already enrolled in Tele-monitoring program and name of program:  na  Follow-up specialty is recommended: Yes    Follow-up plan:    Future Appointments   Date Time Provider Department Center   3/1/2019  1:00 PM Gabriela Rodas, APRN CNP [facility]   3/29/2019 11:00 AM Mayito Chauhan MD [facility]       Any outstanding tests or procedures:    Procedures     Future Labs/Procedures    Oxygen - Nasal cannula     Comments:    3 Lpm by nasal cannula to keep O2 sats 92% or greater.          Referrals     Future Labs/Procedures    Occupational Therapy Adult Consult     Comments:    Evaluate and treat as clinically indicated.    Reason:  deconditioning    Physical Therapy  Adult Consult     Comments:    Evaluate and treat as clinically indicated.    Reason:  deconditioning          Follow Up and recommended labs and tests    Follow up with senior living physician.  The following labs/tests are recommended: BMP, CBC in 1 week.   Follow up with urology in 2 weeks to discuss stent and definitive stone management.         Key Recommendations:  Pt was admitted post fall with urosepsis.  She has a history of MS.  They also incidentally found a Femur fracture, which was old and didn't require intervention.  Pt was extubated as well for her respiratory failure.  Pt was discharged to [facility] TCU for rehab.  Pt would benefit from close follow up as she is frail.      Gabriela Soriano    AVS/Discharge Summary is the source of truth; this is a helpful guide for improved communication of patient story

## 2019-02-28 NOTE — TELEPHONE ENCOUNTER
Spouse returning call    Stated that he would like to get Amanda out of [facility] because the care and food is not good.   Would like care coordination/social work referral - stated she did have this in the hospital and they were great, but stated they can no longer help her as she is out of the hospital now.     Referral placed.   [employer] Message sent with referral information per spouse request.     Ivanna Kelly RN  [facility]

## 2019-02-28 NOTE — PROGRESS NOTES
[place] [facility]  PRIMARY CARE PROVIDER AND CLINIC:  Julius Hassan [address] [facility] / [facility] [id number]  Chief Complaint   Patient presents with     Hospital F/U     [facility] Medical Record Number:  [id number]  Place of Service where encounter took place:  [facility] ([facility] [[id number]]    HPI:    Amanda Richardson is a 53 year old  (1965),admitted to the above facility from  [facility].  Hospital stay 1/30/19 through 2/27/19.  Admitted to this facility for  rehab, medical management and nursing care.  HPI information obtained from: facility chart records, facility staff, patient report and [facility] chart review.           Hospital Course:   Amanda Richardson is a 53 year old female with past medical history significant for advanced MS, diabetes mellitus, hypertension, history of T-cell lymphoma status post treatment, chronically elevated WBC count, history of recurrent UTIs who presented with altered mental status increased weakness and somnolence.       Evaluation in the emergency room showed significant leukocytosis, hypotension, acute renal failure, obstructing left kidney stone with moderate hydronephrosis and respiratory failure with acidosis with hypercapnia.  She was admitted on 1/30/2019 with septic shock.  Patient received IV antibiotics and fluid resuscitation in the emergency room and underwent emergent cystoscopy and left retrograde pyelogram with stent placement on 1/30/2019.  CT also showed closed fracture of right femur.  She was initially placed on BiPAP for respiratory failure but perioperatively was intubated.  She was admitted to ICU for further evaluation.     1/31/2019 patient developed wide-complex tachycardia appeared to be SVT failed to respond to vasovagal maneuvers.  Adenosine was given which showed rhythm to be atrial flutter she received cardioversion and was started on amiodarone.  Vasopressor drip was initiated briefly  for hypotension.  She is now off pressors as well as sedative meds.      Pt extubated successfully on 2/8.  Now able to eat regular diet.  She was transferred to the medical floor on 2/10/19.  She remained hypoxic and received diuresis with dramatic improvement of her weight.  She remains on 3 L of oxygen by nasal cannula which I suspect is due to atelectasis and severe deconditioning.  She has completed a course of antibiotics.  She will need supplemental oxygen on discharge.  She will discharged to TCU today.  She will require follow-up in the urology clinic for definitive management of her stone and stent.     Septic shock likely due to urinary tract source (altered mental status, acute renal failure, hypotension, leukocytosis, acidosis, respiratory failure).  She received aggressive fluid resuscitation along with initiation of pressors prior to being taken to the operating room.  She now is stable and shock is fully resolved.  --CT scan on admission showed obstructing stone with hydronephrosis and acute renal failure.  --Status post cystoscopy and stent placement 1/30/2019.  Per report patient did have purulent drainage noted during cystoscopy.  --IV antibiotics: Started zosyn to cover for E. Coli isolate, then on ceftriaxone.  Started on 1/30.  We planned on 14 days total.  End date was 2/13.  --Blood and urine cultures positive for E coli.  --Extubated as of 2/8/19.      Hypercapnic respiratory failure present on admission.  Still with some hypoxia likely related to volume overload from resolved sepsis and associated necessary management with severe deconditioning and atelectasis.  --Likely multifactorial from sepsis and chronic narcotic use.  --Was intubated perioperatively and remained intubated following the procedure until 2/8.  --Stop IV diuresis.     Acute renal failure with left hydronephrosis: Resolved and now at baseline  --Secondary to obstructive uropathy.  Creatinine on admission 2.25, now with  "creatinine at baseline.  --Status post a stent placement; anticipate outpatient follow-up for definitive treatment of the stone and removal of the stent.     New Atrial flutter provoked by urosepsis.  Stable without recurrence.  --Status post DC cardioversion 1/31/2019  --Appreciate cardiology consult  --Cardiac echo when compared to prior study showed minimal deterioration of left ventricular systolic function.  EF 50-55%.  --No anticoagulation for now per cardiology patient was in atrial flutter for less than 8 hours.  --Switched to oral amiodarone for a total of 2 weeks (now on 200 mg daily).  Will stop at the time of discharge.     Right femoral neck fracture  --Incidental finding on CT.  Likely present for quite some time.  --Patient has MS and at baseline is able to pivot herself.  --At this time, pt notes that the right hip is painful with vigorous movement.  --Orthopedic surgery was consulted.  No immediate intervention recommended at this time.     Type 2 diabetes mellitus, \"borderline\"  --At baseline on metformin will hold for now  --Sliding scale insulin     MS with associated chronic pain  --Prior to admission on amitriptyline, baclofen, duloxetine, gabapentin and oxycodone.  --PT evaluation and TCU planned     Significant leukocytosis: Improving  --Patient has history of T-cell non-Hodgkin's lymphoma status post treatment with baseline elevated white cell count (mid-teens).      H/o Hypertension  --Resumed losartan and hydrochlorothiazide (not quite at pre-hospitalization doses).  --BP OK.     Bilateral inguinal wounds  --Managed with topical wound cares.  --Should likely have home help with management of these upon discharge.     Constipation: In the setting of scheduled narcotic use.  Continue laxatives, give enema.     The patient was seen, examined, and counseled on this day. The hospitalization and plan of care was reviewed with the patient extensively. All questions were addressed and the patient " agreed to follow-up as noted above.    _____________________________  Current issues are:         Septic shock due to urinary tract infection (H)  Acute respiratory failure with hypoxia and hypercapnia (H)  Acute renal failure, unspecified acute renal failure type (H)  New onset atrial flutter (H)  Borderline type 2 diabetes mellitus  MS (multiple sclerosis) (H)  Chronic pain syndrome  Hypertension goal BP (blood pressure) < 140/90  Closed fracture of neck of right femur with delayed healing, subsequent encounter  Constipation, unspecified constipation type  Open wound of groin without complication, subsequent encounter  Buttock wound, unspecified laterality, sequela  Physical deconditioning     Patient seen today for admission visit to TCU following above noted hospitalization. Unable to locate discharge summary at this time - appears to have been further adjustments following last progress note from Dr. Greco on 2/26 - have contacted provider for help locating discharge summary. Patient notes today that overall she is feeling much better than upon her admission to the hospital. She notes that she had gradually been feeling worse up to her admission point. It was noted upon review of Epic notes that her PTA Metformin had been stopped - thus we discuss her PO intake and ability to restart. However, upon further review of her facility chart this has been restarted but not a lot of BS levels to assess for efficacy at this point. She does note that her appetite is okay - but per Epic notes she is having issues with the food in the facility. She did work with therapies and note this went well - PTA she was home most of the day alone while her  was at work and was independent with pivot to/from chairs. We discuss that returning to a safe PTA baseline will be her goal and she is in agreement with this. I ask about trauma in re:to the incidental finding of her R femur fx and she denies presence of trauma other  "than when she had fractured her R ankle back in 2015 and questions if this may have happened then and it was just not diagnosed at that time as she cannot recall any further incidents. Pain is managed today on regimen of Oxycodone and chronic regimen of Cymbalta and gabapentin. When asked about bowel status she notes that inpatient she was having issues with constipation thus they \"gave her everything\" and now she is noting \"too much\" and is concerned about loose stools. Denies CP, palpitations, fatigue, nausea, vomiting, increased SOB/NOGUERA, fever, chills, and/or b/b concerns today.    BP: 116-133/65-75 mmHg  P: 72-83 bpm  Recent Blood Sugars:  02/27/2019 21:51   Blood Sugar: 142 mg/dL    CODE STATUS/ADVANCE DIRECTIVES DISCUSSION:   CPR/Full code   Patient's living condition: lives with spouse    ALLERGIES:Lisinopril; Cyclobenzaprine; and Flexeril [cyclobenzaprine hcl]  PAST MEDICAL HISTORY:  has a past medical history of Abnormality of gait (7/27/2012), Chronic pain, CKD (chronic kidney disease) stage 1, GFR 90 ml/min or greater, Colon polyps (1/15), Gallstone (6/11/2012), Hyperlipidemia LDL goal <70, Hypertension goal BP (blood pressure) < 140/90, Leukocytosis (6/11/2012), Moderate depressive episode (H), MS (multiple sclerosis) (H) (2003), Non Hodgkin's lymphoma (H) (06/11/2012), Nonallopathic lesion of cervical region, not elsewhere classified (9/24/2012), Nonallopathic lesion of thoracic region, not elsewhere classified (9/24/2012), Numbness and tingling, Obesity (6/11/2012), Pain in joint, pelvic region and thigh (7/20/2012), Prediabetes, Spinal stenosis, lumbar (6/17/2012), T-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 She also has no past medical history of Malignant hyperthermia or Sleep apnea.  PAST SURGICAL HISTORY:  has a past surgical history that includes Fusion lumbar anterior, fusion lumbar posterior two levels, combined (10/17/2013); Colonoscopy " (N/A, 1/7/2015); Open reduction internal fixation ankle (5/15); Open reduction internal fixation ankle (Right, 11/2015); Insert pump baclofen (04/2017); Irrigation and debridement lower extremity, combined (Right, 2015); XR Lumbar Epidural Injection Incl Imaging (3/14); sinus surgery (2011); and Combined Cystoscopy, Retrogrades, Ureteroscopy, Insert Stent (Left, 1/30/2019).  FAMILY HISTORY: family history includes Breast Cancer (age of onset: 58) in her sister; C.A.D. in her father; Cancer in her father; Hypertension in her mother; Thyroid Disease in her mother and son.  SOCIAL HISTORY:  reports that she has been smoking cigarettes.  She has been smoking about 0.50 packs per day. she has never used smokeless tobacco. She reports that she does not drink alcohol or use drugs.    Post Discharge Medication Reconciliation Status: discharge medications reconciled, continue medications without change.  Current Outpatient Medications   Medication Sig Dispense Refill     amitriptyline (ELAVIL) 100 MG tablet Take 1 tablet (100 mg) by mouth At Bedtime 90 tablet 3     ASPIRIN PO Take 162 mg by mouth daily       atorvastatin (LIPITOR) 10 MG tablet Take 1 tablet (10 mg) by mouth daily 90 tablet 3     baclofen (LIORESAL) 10 MG tablet Take 1 tablet (10 mg) by mouth 3 times daily May take an additional 10 mg as needed qd for total of 40 mg per day 30 tablet 0     bisacodyl (DULCOLAX) 10 MG suppository Place 1 suppository (10 mg) rectally daily as needed for constipation       cholecalciferol 1000 units TABS Take 1,000 Units by mouth daily       docusate sodium (COLACE) 100 MG capsule Take 100 mg by mouth 2 times daily as needed for constipation       DULoxetine (CYMBALTA) 60 MG capsule Take 60 mg by mouth 2 times daily       furosemide (LASIX) 40 MG tablet Take 1 tablet (40 mg) by mouth daily 30 tablet 0     gabapentin (NEURONTIN) 600 MG tablet Take 1 tablet (600 mg) by mouth 3 times daily 30 tablet 0     hydrochlorothiazide  "(MICROZIDE) 12.5 MG capsule 1 capsule (12.5 mg) by Oral or Feeding Tube route daily 30 capsule 0     losartan (COZAAR) 50 MG tablet Take 1 tablet (50 mg) by mouth daily 30 tablet 0     metFORMIN (GLUCOPHAGE) 500 MG tablet Take 1 tablet (500 mg) by mouth daily (with dinner) 90 tablet 3     metoprolol succinate (TOPROL-XL) 50 MG 24 hr tablet TAKE 1 TABLET BY MOUTH ONCE DAILY 90 tablet 1     Multiple Vitamin (MULTI-VITAMIN PO) Take 1 tablet by mouth daily        omega-3 fatty acids (FISH OIL) 1200 MG capsule Take 1 capsule by mouth daily.       oxyCODONE IR (ROXICODONE) 15 MG tablet Take 0.5-1 tablets (7.5-15 mg) by mouth every 4 hours as needed for moderate to severe pain Limit 4 tablet(s) per day. 30 tablet 0     polyethylene glycol (MIRALAX/GLYCOLAX) packet Take 17 g by mouth 2 times daily as needed (constipation)       senna-docusate (SENOKOT-S/PERICOLACE) 8.6-50 MG tablet Take 3 tablets by mouth 2 times daily 180 tablet 0       ROS:  10 point ROS of systems including Constitutional, Eyes, Respiratory, Cardiovascular, Gastroenterology, Genitourinary, Integumentary, Musculoskeletal, Psychiatric were all negative except for pertinent positives noted in my HPI.    Exam:  /65   Pulse 72   Temp 98.5  F (36.9  C)   Resp 18   Ht 1.727 m (5' 8\")   Wt 113.2 kg (249 lb 9.6 oz)   LMP 12/11/2011   SpO2 92%   BMI 37.95 kg/m    GENERAL APPEARANCE:  Alert, in no distress, appears healthy, oriented, cooperative  ENT:  Mouth and posterior oropharynx normal, moist mucous membranes, [place]  EYES:  EOM, conjunctivae, lids, pupils and irises normal  NECK:  No adenopathy,masses or thyromegaly  RESP:  respiratory effort and palpation of chest normal, lungs clear to auscultation , no respiratory distress  CV:  Palpation and auscultation of heart done , regular rate and rhythm, no murmur, rub, or gallop, no edema, +2 pedal pulses  ABDOMEN:  normal bowel sounds, soft, nontender, no hepatosplenomegaly or other masses  M/S:   Gait " and station abnormal - w/c bound at baseline 2/2 MS  Digits and nails abnormal - contractures of bilateral feet/toes 2/2 MS  Examination of:   right lower extremity and left lower extremity  Inspection, ROM, stability and muscle strength diminished/absent 2/2 MS  SKIN:  Inspection of skin and subcutaneous tissue baseline, Palpation of skin and subcutaneous tissue baseline  NEURO:   Cranial nerves 2-12 are normal tested and grossly at patient's baseline  PSYCH:  oriented X 3, normal insight, judgement and memory, affect and mood normal    Lab/Diagnostic data:     CBC RESULTS:   Recent Labs   Lab Test 02/27/19  0642 02/24/19  0640  02/19/19  0631  02/15/19  0634   WBC  --   --   --  11.7*  --  12.7*   RBC  --   --   --  3.88  --  3.90   HGB  --   --   --  10.1*  --  10.3*   HCT  --   --   --  34.0*  --  34.0*   MCV  --   --   --  88  --  87   MCH  --   --   --  26.0*  --  26.4*   MCHC  --   --   --  29.7*  --  30.3*   RDW  --   --   --  18.6*  --  18.6*    294   < > 432   < > 592*    < > = values in this interval not displayed.       Last Basic Metabolic Panel:  Recent Labs   Lab Test 02/25/19  0703 02/19/19  0631 02/16/19  0621   NA  --  139 139   POTASSIUM 4.3 4.2 4.2   CHLORIDE  --  101 97   MARY  --  8.6 8.6   CO2  --  35* 39*   BUN  --  21 23   CR  --  0.71 0.84   GLC  --  91 91       Liver Function Studies -   Recent Labs   Lab Test 02/12/19  0555 01/30/19  1258   PROTTOTAL 7.0 7.5   ALBUMIN 2.1* 2.1*   BILITOTAL 0.4 0.9   ALKPHOS 174* 163*   AST 22 221*   ALT 35 89*       TSH   Date Value Ref Range Status   01/30/2019 5.08 (H) 0.40 - 4.00 mU/L Final   06/20/2018 2.92 0.40 - 4.00 mU/L Final       Lab Results   Component Value Date    A1C 6.6 02/09/2019    A1C 6.4 01/30/2019       ASSESSMENT/PLAN:  (A41.9,  R65.21,  N39.0) Septic shock due to urinary tract infection (H)  (primary encounter diagnosis)  Comment: As noted above - no ongoing urinary sx. Completed abx inpatient. VSS.  Plan: Monitor. CBC  3/4.    (J96.01,  J96.02) Acute respiratory failure with hypoxia and hypercapnia (H)  Comment: Resolved inpatient - no respiratory concerns noted today.  Plan: Monitor.    (N17.9) Acute renal failure, unspecified acute renal failure type (H)  Comment: Noted inpatient - improving prior to discharge.  Creatinine   Date Value Ref Range Status   02/19/2019 0.71 0.52 - 1.04 mg/dL Final   Plan: BMP 3/4.    (I48.92) New onset atrial flutter (H)  Comment: As noted above - denies feelings of palpitations, SOB, fatigue today. On regimen of Metoprolol.  Plan: Continue medications as ordered. Monitor. VS per facility protocol.    (R73.03) Borderline type 2 diabetes mellitus  Comment: As noted above - Metformin restarted prior to discharge to TCU.  Plan: Continue medications as ordered. Monitor BS.    (G35) MS (multiple sclerosis) (H)  Comment: Chronic - w/c bound at baseline. Resides at home with  - is mostly independent at home with pivot transferring.   Plan: PT in TCU to assess for safety to return home    (G89.4) Chronic pain syndrome  Comment: Chronic - controlled today. Managed on regimen of Baclofen, Neurontin, Oxycodone, and Cymbalta.  Plan: Continue medications as ordered.     (I10) Hypertension goal BP (blood pressure) < 140/90  Comment: BPs WNL - Based on JNC-8 goals,  patients age of 53 year old, presence of diabetes or CKD, and goals of care goal BP is <150/90 mm Hg. Patient is stable and continue without pharmacological invention with routine assessment.. Managed on regimen of Metoprolol, Losartan, Lasix and hydrochlorothiazide.  Plan: Continue medications as ordered. BMP as above.    (S72.001G) Closed fracture of neck of right femur with delayed healing, subsequent encounter  Comment: As noted above - she feels this occurred with past ankle fx and just wasn't noted. States it's not bothersome today.   Plan: Pain control as above. Monitor.     (K59.00) Constipation, unspecified constipation type  Comment:  As noted above - no longer a concern. On current regimen of Colace, Senna-S and Miralax.   Plan: Regimen as above - hold for loose stools    (R53.81) Physical deconditioning  Comment: 2/2 above noted diagnoses and recent hospitalizations  Plan: PT/OT eval/tx -adv per their recommendations    Electronically signed by:  BAYLEE Peters, [facility]  Phone: [phone]  Fax: [phone]

## 2019-02-28 NOTE — TELEPHONE ENCOUNTER
From 's mychart request:     Dr. Hassan,     I'm FernandoAmanda  Amanda is in [facility] transition care in [place].   The care there is very bad. I'm contacting you for help in getting her into another facility and was wondering if you could help.   The ones I've contacted give priority to people in the hospital and a lot are full .    Please call or email if you can help.  Fernando Richardson   [phone]     Routing to PCP for further review/recommendations/orders.  Cheryl Graner RN  [facility]

## 2019-03-01 ENCOUNTER — PATIENT OUTREACH (OUTPATIENT)
Dept: CARE COORDINATION | Facility: CLINIC | Age: 54
End: 2019-03-01

## 2019-03-01 ENCOUNTER — NURSING HOME VISIT (OUTPATIENT)
Dept: GERIATRICS | Facility: CLINIC | Age: 54
End: 2019-03-01
Payer: COMMERCIAL

## 2019-03-01 DIAGNOSIS — S72.001G CLOSED FRACTURE OF NECK OF RIGHT FEMUR WITH DELAYED HEALING, SUBSEQUENT ENCOUNTER: ICD-10-CM

## 2019-03-01 DIAGNOSIS — S31.809S BUTTOCK WOUND, UNSPECIFIED LATERALITY, SEQUELA: ICD-10-CM

## 2019-03-01 DIAGNOSIS — N39.0 SEPTIC SHOCK DUE TO URINARY TRACT INFECTION (H): Primary | ICD-10-CM

## 2019-03-01 DIAGNOSIS — S31.109D: ICD-10-CM

## 2019-03-01 DIAGNOSIS — I48.92 NEW ONSET ATRIAL FLUTTER (H): ICD-10-CM

## 2019-03-01 DIAGNOSIS — R65.21 SEPTIC SHOCK DUE TO URINARY TRACT INFECTION (H): Primary | ICD-10-CM

## 2019-03-01 DIAGNOSIS — I10 HYPERTENSION GOAL BP (BLOOD PRESSURE) < 140/90: ICD-10-CM

## 2019-03-01 DIAGNOSIS — G35 MS (MULTIPLE SCLEROSIS) (H): ICD-10-CM

## 2019-03-01 DIAGNOSIS — J96.01 ACUTE RESPIRATORY FAILURE WITH HYPOXIA AND HYPERCAPNIA (H): ICD-10-CM

## 2019-03-01 DIAGNOSIS — R53.81 PHYSICAL DECONDITIONING: ICD-10-CM

## 2019-03-01 DIAGNOSIS — R73.03 BORDERLINE TYPE 2 DIABETES MELLITUS: ICD-10-CM

## 2019-03-01 DIAGNOSIS — J96.02 ACUTE RESPIRATORY FAILURE WITH HYPOXIA AND HYPERCAPNIA (H): ICD-10-CM

## 2019-03-01 DIAGNOSIS — K59.00 CONSTIPATION, UNSPECIFIED CONSTIPATION TYPE: ICD-10-CM

## 2019-03-01 DIAGNOSIS — A41.9 SEPTIC SHOCK DUE TO URINARY TRACT INFECTION (H): Primary | ICD-10-CM

## 2019-03-01 DIAGNOSIS — N17.9 ACUTE RENAL FAILURE, UNSPECIFIED ACUTE RENAL FAILURE TYPE (H): ICD-10-CM

## 2019-03-01 DIAGNOSIS — G89.4 CHRONIC PAIN SYNDROME: ICD-10-CM

## 2019-03-01 PROCEDURE — 99309 SBSQ NF CARE MODERATE MDM 30: CPT | Performed by: NURSE PRACTITIONER

## 2019-03-01 PROCEDURE — 99207 ZZC CDG-MDM COMPONENT: MEETS LOW - DOWN CODED: CPT | Performed by: NURSE PRACTITIONER

## 2019-03-01 NOTE — LETTER
3/1/2019        RE: Amanda Richardson  [address] [place] [address]        [facility]  PRIMARY CARE PROVIDER AND CLINIC:  Juluis Hassan [address] [facility] [address]  Chief Complaint   Patient presents with     Hospital F/U     [facility] Medical Record Number:  [id number]  Place of Service where encounter took place:  [facility] (S) [[id number]]    HPI:    Amanda Richardson is a 53 year old  (1965),admitted to the above facility from  [facility].  Hospital stay 1/30/19 through 2/27/19.  Admitted to this facility for  rehab, medical management and nursing care.  HPI information obtained from: facility chart records, facility staff, patient report and [facility] chart review.           Hospital Course:   Amanda Richardson is a 53 year old female with past medical history significant for advanced MS, diabetes mellitus, hypertension, history of T-cell lymphoma status post treatment, chronically elevated WBC count, history of recurrent UTIs who presented with altered mental status increased weakness and somnolence.       Evaluation in the emergency room showed significant leukocytosis, hypotension, acute renal failure, obstructing left kidney stone with moderate hydronephrosis and respiratory failure with acidosis with hypercapnia.  She was admitted on 1/30/2019 with septic shock.  Patient received IV antibiotics and fluid resuscitation in the emergency room and underwent emergent cystoscopy and left retrograde pyelogram with stent placement on 1/30/2019.  CT also showed closed fracture of right femur.  She was initially placed on BiPAP for respiratory failure but perioperatively was intubated.  She was admitted to ICU for further evaluation.     1/31/2019 patient developed wide-complex tachycardia appeared to be SVT failed to respond to vasovagal maneuvers.  Adenosine was given which showed rhythm to be atrial flutter she  received cardioversion and was started on amiodarone.  Vasopressor drip was initiated briefly for hypotension.  She is now off pressors as well as sedative meds.      Pt extubated successfully on 2/8.  Now able to eat regular diet.  She was transferred to the medical floor on 2/10/19.  She remained hypoxic and received diuresis with dramatic improvement of her weight.  She remains on 3 L of oxygen by nasal cannula which I suspect is due to atelectasis and severe deconditioning.  She has completed a course of antibiotics.  She will need supplemental oxygen on discharge.  She will discharged to TCU today.  She will require follow-up in the urology clinic for definitive management of her stone and stent.     Septic shock likely due to urinary tract source (altered mental status, acute renal failure, hypotension, leukocytosis, acidosis, respiratory failure).  She received aggressive fluid resuscitation along with initiation of pressors prior to being taken to the operating room.  She now is stable and shock is fully resolved.  --CT scan on admission showed obstructing stone with hydronephrosis and acute renal failure.  --Status post cystoscopy and stent placement 1/30/2019.  Per report patient did have purulent drainage noted during cystoscopy.  --IV antibiotics: Started zosyn to cover for E. Coli isolate, then on ceftriaxone.  Started on 1/30.  We planned on 14 days total.  End date was 2/13.  --Blood and urine cultures positive for E coli.  --Extubated as of 2/8/19.      Hypercapnic respiratory failure present on admission.  Still with some hypoxia likely related to volume overload from resolved sepsis and associated necessary management with severe deconditioning and atelectasis.  --Likely multifactorial from sepsis and chronic narcotic use.  --Was intubated perioperatively and remained intubated following the procedure until 2/8.  --Stop IV diuresis.     Acute renal failure with left hydronephrosis: Resolved and now  "at baseline  --Secondary to obstructive uropathy.  Creatinine on admission 2.25, now with creatinine at baseline.  --Status post a stent placement; anticipate outpatient follow-up for definitive treatment of the stone and removal of the stent.     New Atrial flutter provoked by urosepsis.  Stable without recurrence.  --Status post DC cardioversion 1/31/2019  --Appreciate cardiology consult  --Cardiac echo when compared to prior study showed minimal deterioration of left ventricular systolic function.  EF 50-55%.  --No anticoagulation for now per cardiology patient was in atrial flutter for less than 8 hours.  --Switched to oral amiodarone for a total of 2 weeks (now on 200 mg daily).  Will stop at the time of discharge.     Right femoral neck fracture  --Incidental finding on CT.  Likely present for quite some time.  --Patient has MS and at baseline is able to pivot herself.  --At this time, pt notes that the right hip is painful with vigorous movement.  --Orthopedic surgery was consulted.  No immediate intervention recommended at this time.     Type 2 diabetes mellitus, \"borderline\"  --At baseline on metformin will hold for now  --Sliding scale insulin     MS with associated chronic pain  --Prior to admission on amitriptyline, baclofen, duloxetine, gabapentin and oxycodone.  --PT evaluation and TCU planned     Significant leukocytosis: Improving  --Patient has history of T-cell non-Hodgkin's lymphoma status post treatment with baseline elevated white cell count (mid-teens).      H/o Hypertension  --Resumed losartan and hydrochlorothiazide (not quite at pre-hospitalization doses).  --BP OK.     Bilateral inguinal wounds  --Managed with topical wound cares.  --Should likely have home help with management of these upon discharge.     Constipation: In the setting of scheduled narcotic use.  Continue laxatives, give enema.     The patient was seen, examined, and counseled on this day. The hospitalization and plan of " care was reviewed with the patient extensively. All questions were addressed and the patient agreed to follow-up as noted above.    _____________________________  Current issues are:         Septic shock due to urinary tract infection (H)  Acute respiratory failure with hypoxia and hypercapnia (H)  Acute renal failure, unspecified acute renal failure type (H)  New onset atrial flutter (H)  Borderline type 2 diabetes mellitus  MS (multiple sclerosis) (H)  Chronic pain syndrome  Hypertension goal BP (blood pressure) < 140/90  Closed fracture of neck of right femur with delayed healing, subsequent encounter  Constipation, unspecified constipation type  Open wound of groin without complication, subsequent encounter  Buttock wound, unspecified laterality, sequela  Physical deconditioning     Patient seen today for admission visit to TCU following above noted hospitalization. Unable to locate discharge summary at this time - appears to have been further adjustments following last progress note from Dr. Greco on 2/26 - have contacted provider for help locating discharge summary. Patient notes today that overall she is feeling much better than upon her admission to the hospital. She notes that she had gradually been feeling worse up to her admission point. It was noted upon review of Epic notes that her PTA Metformin had been stopped - thus we discuss her PO intake and ability to restart. However, upon further review of her facility chart this has been restarted but not a lot of BS levels to assess for efficacy at this point. She does note that her appetite is okay - but per Epic notes she is having issues with the food in the facility. She did work with therapies and note this went well - PTA she was home most of the day alone while her  was at work and was independent with pivot to/from chairs. We discuss that returning to a safe PTA baseline will be her goal and she is in agreement with this. I ask about trauma  "in re:to the incidental finding of her R femur fx and she denies presence of trauma other than when she had fractured her R ankle back in 2015 and questions if this may have happened then and it was just not diagnosed at that time as she cannot recall any further incidents. Pain is managed today on regimen of Oxycodone and chronic regimen of Cymbalta and gabapentin. When asked about bowel status she notes that inpatient she was having issues with constipation thus they \"gave her everything\" and now she is noting \"too much\" and is concerned about loose stools. Denies CP, palpitations, fatigue, nausea, vomiting, increased SOB/NOGUERA, fever, chills, and/or b/b concerns today.    BP: 116-133/65-75 mmHg  P: 72-83 bpm  Recent Blood Sugars:  02/27/2019 21:51   Blood Sugar: 142 mg/dL    CODE STATUS/ADVANCE DIRECTIVES DISCUSSION:   CPR/Full code   Patient's living condition: lives with spouse    ALLERGIES:Lisinopril; Cyclobenzaprine; and Flexeril [cyclobenzaprine hcl]  PAST MEDICAL HISTORY:  has a past medical history of Abnormality of gait (7/27/2012), Chronic pain, CKD (chronic kidney disease) stage 1, GFR 90 ml/min or greater, Colon polyps (1/15), Gallstone (6/11/2012), Hyperlipidemia LDL goal <70, Hypertension goal BP (blood pressure) < 140/90, Leukocytosis (6/11/2012), Moderate depressive episode (H), MS (multiple sclerosis) (H) (2003), Non Hodgkin's lymphoma (H) (06/11/2012), Nonallopathic lesion of cervical region, not elsewhere classified (9/24/2012), Nonallopathic lesion of thoracic region, not elsewhere classified (9/24/2012), Numbness and tingling, Obesity (6/11/2012), Pain in joint, pelvic region and thigh (7/20/2012), Prediabetes, Spinal stenosis, lumbar (6/17/2012), T-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 She also has no past medical history of Malignant hyperthermia or Sleep apnea.  PAST SURGICAL HISTORY:  has a past surgical history that includes " Fusion lumbar anterior, fusion lumbar posterior two levels, combined (10/17/2013); Colonoscopy (N/A, 1/7/2015); Open reduction internal fixation ankle (5/15); Open reduction internal fixation ankle (Right, 11/2015); Insert pump baclofen (04/2017); Irrigation and debridement lower extremity, combined (Right, 2015); XR Lumbar Epidural Injection Incl Imaging (3/14); sinus surgery (2011); and Combined Cystoscopy, Retrogrades, Ureteroscopy, Insert Stent (Left, 1/30/2019).  FAMILY HISTORY: family history includes Breast Cancer (age of onset: 58) in her sister; C.A.D. in her father; Cancer in her father; Hypertension in her mother; Thyroid Disease in her mother and son.  SOCIAL HISTORY:  reports that she has been smoking cigarettes.  She has been smoking about 0.50 packs per day. she has never used smokeless tobacco. She reports that she does not drink alcohol or use drugs.    Post Discharge Medication Reconciliation Status: discharge medications reconciled, continue medications without change.  Current Outpatient Medications   Medication Sig Dispense Refill     amitriptyline (ELAVIL) 100 MG tablet Take 1 tablet (100 mg) by mouth At Bedtime 90 tablet 3     ASPIRIN PO Take 162 mg by mouth daily       atorvastatin (LIPITOR) 10 MG tablet Take 1 tablet (10 mg) by mouth daily 90 tablet 3     baclofen (LIORESAL) 10 MG tablet Take 1 tablet (10 mg) by mouth 3 times daily May take an additional 10 mg as needed qd for total of 40 mg per day 30 tablet 0     bisacodyl (DULCOLAX) 10 MG suppository Place 1 suppository (10 mg) rectally daily as needed for constipation       cholecalciferol 1000 units TABS Take 1,000 Units by mouth daily       docusate sodium (COLACE) 100 MG capsule Take 100 mg by mouth 2 times daily as needed for constipation       DULoxetine (CYMBALTA) 60 MG capsule Take 60 mg by mouth 2 times daily       furosemide (LASIX) 40 MG tablet Take 1 tablet (40 mg) by mouth daily 30 tablet 0     gabapentin (NEURONTIN) 600 MG  "tablet Take 1 tablet (600 mg) by mouth 3 times daily 30 tablet 0     hydrochlorothiazide (MICROZIDE) 12.5 MG capsule 1 capsule (12.5 mg) by Oral or Feeding Tube route daily 30 capsule 0     losartan (COZAAR) 50 MG tablet Take 1 tablet (50 mg) by mouth daily 30 tablet 0     metFORMIN (GLUCOPHAGE) 500 MG tablet Take 1 tablet (500 mg) by mouth daily (with dinner) 90 tablet 3     metoprolol succinate (TOPROL-XL) 50 MG 24 hr tablet TAKE 1 TABLET BY MOUTH ONCE DAILY 90 tablet 1     Multiple Vitamin (MULTI-VITAMIN PO) Take 1 tablet by mouth daily        omega-3 fatty acids (FISH OIL) 1200 MG capsule Take 1 capsule by mouth daily.       oxyCODONE IR (ROXICODONE) 15 MG tablet Take 0.5-1 tablets (7.5-15 mg) by mouth every 4 hours as needed for moderate to severe pain Limit 4 tablet(s) per day. 30 tablet 0     polyethylene glycol (MIRALAX/GLYCOLAX) packet Take 17 g by mouth 2 times daily as needed (constipation)       senna-docusate (SENOKOT-S/PERICOLACE) 8.6-50 MG tablet Take 3 tablets by mouth 2 times daily 180 tablet 0       ROS:  10 point ROS of systems including Constitutional, Eyes, Respiratory, Cardiovascular, Gastroenterology, Genitourinary, Integumentary, Musculoskeletal, Psychiatric were all negative except for pertinent positives noted in my HPI.    Exam:  /65   Pulse 72   Temp 98.5  F (36.9  C)   Resp 18   Ht 1.727 m (5' 8\")   Wt 113.2 kg (249 lb 9.6 oz)   LMP 12/11/2011   SpO2 92%   BMI 37.95 kg/m     GENERAL APPEARANCE:  Alert, in no distress, appears healthy, oriented, cooperative  ENT:  Mouth and posterior oropharynx normal, moist mucous membranes, [place]  EYES:  EOM, conjunctivae, lids, pupils and irises normal  NECK:  No adenopathy,masses or thyromegaly  RESP:  respiratory effort and palpation of chest normal, lungs clear to auscultation , no respiratory distress  CV:  Palpation and auscultation of heart done , regular rate and rhythm, no murmur, rub, or gallop, no edema, +2 pedal pulses  ABDOMEN:  " normal bowel sounds, soft, nontender, no hepatosplenomegaly or other masses  M/S:   Gait and station abnormal - w/c bound at baseline 2/2 MS  Digits and nails abnormal - contractures of bilateral feet/toes 2/2 MS  Examination of:   right lower extremity and left lower extremity  Inspection, ROM, stability and muscle strength diminished/absent 2/2 MS  SKIN:  Inspection of skin and subcutaneous tissue baseline, Palpation of skin and subcutaneous tissue baseline  NEURO:   Cranial nerves 2-12 are normal tested and grossly at patient's baseline  PSYCH:  oriented X 3, normal insight, judgement and memory, affect and mood normal    Lab/Diagnostic data:     CBC RESULTS:   Recent Labs   Lab Test 02/27/19  0642 02/24/19  0640  02/19/19  0631  02/15/19  0634   WBC  --   --   --  11.7*  --  12.7*   RBC  --   --   --  3.88  --  3.90   HGB  --   --   --  10.1*  --  10.3*   HCT  --   --   --  34.0*  --  34.0*   MCV  --   --   --  88  --  87   MCH  --   --   --  26.0*  --  26.4*   MCHC  --   --   --  29.7*  --  30.3*   RDW  --   --   --  18.6*  --  18.6*    294   < > 432   < > 592*    < > = values in this interval not displayed.       Last Basic Metabolic Panel:  Recent Labs   Lab Test 02/25/19  0703 02/19/19  0631 02/16/19  0621   NA  --  139 139   POTASSIUM 4.3 4.2 4.2   CHLORIDE  --  101 97   MARY  --  8.6 8.6   CO2  --  35* 39*   BUN  --  21 23   CR  --  0.71 0.84   GLC  --  91 91       Liver Function Studies -   Recent Labs   Lab Test 02/12/19  0555 01/30/19  1258   PROTTOTAL 7.0 7.5   ALBUMIN 2.1* 2.1*   BILITOTAL 0.4 0.9   ALKPHOS 174* 163*   AST 22 221*   ALT 35 89*       TSH   Date Value Ref Range Status   01/30/2019 5.08 (H) 0.40 - 4.00 mU/L Final   06/20/2018 2.92 0.40 - 4.00 mU/L Final       Lab Results   Component Value Date    A1C 6.6 02/09/2019    A1C 6.4 01/30/2019       ASSESSMENT/PLAN:  (A41.9,  R65.21,  N39.0) Septic shock due to urinary tract infection (H)  (primary encounter diagnosis)  Comment: As noted  above - no ongoing urinary sx. Completed abx inpatient. VSS.  Plan: Monitor. CBC 3/4.    (J96.01,  J96.02) Acute respiratory failure with hypoxia and hypercapnia (H)  Comment: Resolved inpatient - no respiratory concerns noted today.  Plan: Monitor.    (N17.9) Acute renal failure, unspecified acute renal failure type (H)  Comment: Noted inpatient - improving prior to discharge.  Creatinine   Date Value Ref Range Status   02/19/2019 0.71 0.52 - 1.04 mg/dL Final   Plan: BMP 3/4.    (I48.92) New onset atrial flutter (H)  Comment: As noted above - denies feelings of palpitations, SOB, fatigue today. On regimen of Metoprolol.  Plan: Continue medications as ordered. Monitor. VS per facility protocol.    (R73.03) Borderline type 2 diabetes mellitus  Comment: As noted above - Metformin restarted prior to discharge to TCU.  Plan: Continue medications as ordered. Monitor BS.    (G35) MS (multiple sclerosis) (H)  Comment: Chronic - w/c bound at baseline. Resides at home with  - is mostly independent at home with pivot transferring.   Plan: PT in TCU to assess for safety to return home    (G89.4) Chronic pain syndrome  Comment: Chronic - controlled today. Managed on regimen of Baclofen, Neurontin, Oxycodone, and Cymbalta.  Plan: Continue medications as ordered.     (I10) Hypertension goal BP (blood pressure) < 140/90  Comment: BPs WNL - Based on JNC-8 goals,  patients age of 53 year old, presence of diabetes or CKD, and goals of care goal BP is <150/90 mm Hg. Patient is stable and continue without pharmacological invention with routine assessment.. Managed on regimen of Metoprolol, Losartan, Lasix and hydrochlorothiazide.  Plan: Continue medications as ordered. BMP as above.    (S72.001G) Closed fracture of neck of right femur with delayed healing, subsequent encounter  Comment: As noted above - she feels this occurred with past ankle fx and just wasn't noted. States it's not bothersome today.   Plan: Pain control as  above. Monitor.     (K59.00) Constipation, unspecified constipation type  Comment: As noted above - no longer a concern. On current regimen of Colace, Senna-S and Miralax.   Plan: Regimen as above - hold for loose stools    (R53.81) Physical deconditioning  Comment: 2/2 above noted diagnoses and recent hospitalizations  Plan: PT/OT eval/tx -adv per their recommendations    Electronically signed by:  BAYLEE Peters, [facility]  Phone: [phone]  Fax: [phone]      Sincerely,        BAYLEE Ruiz CNP

## 2019-03-01 NOTE — PROGRESS NOTES
Clinic Care Coordination Contact  Care Coordination Transition Communication    Clinical Data: [facility]/2019 with diagnosis of:  Key Recommendations:  Pt was admitted post fall with urosepsis.  She has a history of MS.  They also incidentally found a Femur fracture, which was old and didn't require intervention.  Pt was extubated as well for her respiratory failure.  Pt was discharged to [facility] TCU for rehab.  Pt would benefit from close follow up as she is frail.    Gabriela Soriano     Transition to Facility:              Facility Name: [facility] phone# [phone]   RNCC outreached to TCU - spoke with                 Please refer to 03/01/2019 Telephone encounter in addition to MyChart encounter.   Patient/spouse are [facility]   They would like patient to be moved to a different facility.   RNCC informed Triage RN that patient/spouse would have to work with [facility] SW /  for proper placement at another facility.      WIL Morales.   Reviewed case.    Carmen was not able to meet with the patient this morning, however another [facility] colleague planned to do so and review potential for placement options.   Unfortunately, the patient/spouse wanted her to be closer to their home; [place], [place].   [facility] will not do TCU- to- TCU placements.   They will only take from community to TCU or IP Admissions to TCU.   SW staff at [facility] will help with this matter moving forward.   RNCC requested updates once more information is available; provided Carmen with direct contact information for future reference.   RNCC thanked her for today's discussion.     Plan:     RN/SW Care Coordinator will await notification from facility staff informing RN/SW Care Coordinator of patient's discharge plans/needs. RN/SW Care Coordinator will review chart and outreach to facility  staff every 4 weeks and as needed.     RNCC engaged in [employer] communications during encounter.     ENROLLMENT STATUS:   POTENTIAL    CARE COORDINATOR STATUS: Care team updated.     Tracy Romero, BSN, RN   [facility] Care Coordinator - [place], [facility] Locations   Direct:  [phone] (voicemail available)   (Today's Date: 03/01/2019)

## 2019-03-03 ENCOUNTER — RECORDS - HEALTHEAST (OUTPATIENT)
Dept: LAB | Facility: CLINIC | Age: 54
End: 2019-03-03

## 2019-03-04 ENCOUNTER — NURSING HOME VISIT (OUTPATIENT)
Dept: GERIATRICS | Facility: CLINIC | Age: 54
End: 2019-03-04
Payer: COMMERCIAL

## 2019-03-04 VITALS
HEIGHT: 68 IN | RESPIRATION RATE: 18 BRPM | SYSTOLIC BLOOD PRESSURE: 111 MMHG | WEIGHT: 249.6 LBS | HEART RATE: 83 BPM | BODY MASS INDEX: 37.83 KG/M2 | TEMPERATURE: 98.6 F | DIASTOLIC BLOOD PRESSURE: 63 MMHG | OXYGEN SATURATION: 92 %

## 2019-03-04 DIAGNOSIS — E87.6 HYPOKALEMIA: ICD-10-CM

## 2019-03-04 DIAGNOSIS — D64.9 ANEMIA, UNSPECIFIED TYPE: ICD-10-CM

## 2019-03-04 DIAGNOSIS — D72.829 LEUKOCYTOSIS, UNSPECIFIED TYPE: Primary | ICD-10-CM

## 2019-03-04 LAB
ANION GAP SERPL CALCULATED.3IONS-SCNC: 11 MMOL/L (ref 5–18)
BUN SERPL-MCNC: 25 MG/DL (ref 8–22)
CALCIUM SERPL-MCNC: 8.7 MG/DL (ref 8.5–10.5)
CHLORIDE BLD-SCNC: 96 MMOL/L (ref 98–107)
CO2 SERPL-SCNC: 30 MMOL/L (ref 22–31)
CREAT SERPL-MCNC: 0.77 MG/DL (ref 0.6–1.1)
ERYTHROCYTE [DISTWIDTH] IN BLOOD BY AUTOMATED COUNT: 17.5 % (ref 11–14.5)
GFR SERPL CREATININE-BSD FRML MDRD: >60 ML/MIN/1.73M2
GLUCOSE BLD-MCNC: 101 MG/DL (ref 70–125)
HCT VFR BLD AUTO: 35.1 % (ref 35–47)
HGB BLD-MCNC: 10.6 G/DL (ref 12–16)
MCH RBC QN AUTO: 25.8 PG (ref 27–34)
MCHC RBC AUTO-ENTMCNC: 30.2 G/DL (ref 32–36)
MCV RBC AUTO: 85 FL (ref 80–100)
PLATELET # BLD AUTO: 301 THOU/UL (ref 140–440)
PMV BLD AUTO: 9 FL (ref 8.5–12.5)
POTASSIUM BLD-SCNC: 3.3 MMOL/L (ref 3.5–5)
RBC # BLD AUTO: 4.11 MILL/UL (ref 3.8–5.4)
SODIUM SERPL-SCNC: 137 MMOL/L (ref 136–145)
WBC: 15.4 THOU/UL (ref 4–11)

## 2019-03-04 PROCEDURE — 99309 SBSQ NF CARE MODERATE MDM 30: CPT | Performed by: NURSE PRACTITIONER

## 2019-03-04 ASSESSMENT — MIFFLIN-ST. JEOR: SCORE: 1775.68

## 2019-03-04 NOTE — LETTER
"    3/4/2019        RE: Amanda Richardson  [address] [place] [address]        [facility]    Chief Complaint   Patient presents with     Lab Only       [facility] Medical Record Number:  [id number]  Place of Service where encounter took place:  [facility] (S) [[id number]]    HPI:    Amanda Richardson is a 53 year old  (1965), who is being seen today for an episodic care visit.  HPI information obtained from: facility chart records, facility staff, patient report and [facility] chart review. Today's concern is:     Leukocytosis, unspecified type  Anemia, unspecified type  Hypokalemia     Patient seen today for f/u on labs - upon review it is noted that her WBC level is elevated. She notes that she chronically runs 13-15 for her WBC level - she was treated for UTI inpatient and not discharged with further tx. She denies s/sx of infection - no fever, chills, increased fatigue. However, she also notes that she typically does not have sx - she did not prior to recent UTI - she goes from feeling \"icky\" to \"really sick\" without warning. Her Hgb is also noted to be at 10.6 - which is up from prior of 10.1 - she appears to avg ~10. K+ level also slightly low at 3.3 - Avg 3.3-4.1 during recent hospitalization. Denies CP, palpitations and/or lethargy today.          REVIEW OF SYSTEMS:  4 point ROS including Respiratory, CV, GI and , other than that noted in the HPI,  is negative    /63   Pulse 83   Temp 98.6  F (37  C)   Resp 18   Ht 1.727 m (5' 8\")   Wt 113.2 kg (249 lb 9.6 oz)   LMP 12/11/2011   SpO2 92%   BMI 37.95 kg/m     GENERAL APPEARANCE:  Alert, in no distress  RESP:  respiratory effort and palpation of chest normal, lungs clear to auscultation , no respiratory distress  CV:  Palpation and auscultation of heart done , regular rate and rhythm, no murmur, rub, or gallop, no edema, +2 pedal pulses  ABDOMEN:  normal bowel sounds, soft, " nontender, no hepatosplenomegaly or other masses  NEURO:   Cranial nerves 2-12 are normal tested and grossly at patient's baseline  PSYCH:  oriented X 3, normal insight, judgement and memory, affect and mood normal    ASSESSMENT/PLAN:  (D72.829) Leukocytosis, unspecified type  (primary encounter diagnosis)  Comment: WBC trending upward - she denies current s/sx of infection. Will reassess CBC to seeing if it continues to upward trend.   Plan: CBC 3/6 - Monitor closely for s/sx of infection. VS per facility protocol.    (D64.9) Anemia, unspecified type  Comment: Chronic - improving  Plan: CBC as above    (E87.6) Hypokalemia  Comment: Asymptomatic at this time - will reassess to see if she remains low  Plan: BMP 3/6    Electronically signed by:  BAYLEE Peters, RAJAN  [facility]  Phone: [phone]  Fax: [phone]        Sincerely,        BAYLEE Ruiz CNP

## 2019-03-04 NOTE — PROGRESS NOTES
"[facility]    Chief Complaint   Patient presents with     Lab Only       [facility] Record Number:  [id number]  Place of Service where encounter took place:  [facility] ([facility] [[id number]]    HPI:    Amanda Richardson is a 53 year old  (1965), who is being seen today for an episodic care visit.  HPI information obtained from: facility chart records, facility staff, patient report and [facility] chart review. Today's concern is:     Leukocytosis, unspecified type  Anemia, unspecified type  Hypokalemia     Patient seen today for f/u on labs - upon review it is noted that her WBC level is elevated. She notes that she chronically runs 13-15 for her WBC level - she was treated for UTI inpatient and not discharged with further tx. She denies s/sx of infection - no fever, chills, increased fatigue. However, she also notes that she typically does not have sx - she did not prior to recent UTI - she goes from feeling \"icky\" to \"really sick\" without warning. Her Hgb is also noted to be at 10.6 - which is up from prior of 10.1 - she appears to avg ~10. K+ level also slightly low at 3.3 - Avg 3.3-4.1 during recent hospitalization. Denies CP, palpitations and/or lethargy today.          REVIEW OF SYSTEMS:  4 point ROS including Respiratory, CV, GI and , other than that noted in the HPI,  is negative    /63   Pulse 83   Temp 98.6  F (37  C)   Resp 18   Ht 1.727 m (5' 8\")   Wt 113.2 kg (249 lb 9.6 oz)   LMP 12/11/2011   SpO2 92%   BMI 37.95 kg/m    GENERAL APPEARANCE:  Alert, in no distress  RESP:  respiratory effort and palpation of chest normal, lungs clear to auscultation , no respiratory distress  CV:  Palpation and auscultation of heart done , regular rate and rhythm, no murmur, rub, or gallop, no edema, +2 pedal pulses  ABDOMEN:  normal bowel sounds, soft, nontender, no hepatosplenomegaly or other masses  NEURO:   Cranial nerves 2-12 are normal tested " and grossly at patient's baseline  PSYCH:  oriented X 3, normal insight, judgement and memory, affect and mood normal    ASSESSMENT/PLAN:  (D72.829) Leukocytosis, unspecified type  (primary encounter diagnosis)  Comment: WBC trending upward - she denies current s/sx of infection. Will reassess CBC to seeing if it continues to upward trend.   Plan: CBC 3/6 - Monitor closely for s/sx of infection. VS per facility protocol.    (D64.9) Anemia, unspecified type  Comment: Chronic - improving  Plan: CBC as above    (E87.6) Hypokalemia  Comment: Asymptomatic at this time - will reassess to see if she remains low  Plan: BMP 3/6    Electronically signed by:  BAYLEE Peters, RAJAN  [facility]  Phone: [phone]  Fax: [phone]

## 2019-03-05 ENCOUNTER — NURSING HOME VISIT (OUTPATIENT)
Dept: GERIATRICS | Facility: CLINIC | Age: 54
End: 2019-03-05
Payer: COMMERCIAL

## 2019-03-05 VITALS
WEIGHT: 249.6 LBS | HEIGHT: 68 IN | BODY MASS INDEX: 37.83 KG/M2 | SYSTOLIC BLOOD PRESSURE: 111 MMHG | TEMPERATURE: 98.6 F | OXYGEN SATURATION: 95 % | HEART RATE: 83 BPM | RESPIRATION RATE: 18 BRPM | DIASTOLIC BLOOD PRESSURE: 63 MMHG

## 2019-03-05 DIAGNOSIS — J96.01 ACUTE RESPIRATORY FAILURE WITH HYPOXIA AND HYPERCAPNIA (H): ICD-10-CM

## 2019-03-05 DIAGNOSIS — R53.81 PHYSICAL DECONDITIONING: ICD-10-CM

## 2019-03-05 DIAGNOSIS — J96.02 ACUTE RESPIRATORY FAILURE WITH HYPOXIA AND HYPERCAPNIA (H): ICD-10-CM

## 2019-03-05 DIAGNOSIS — R73.03 BORDERLINE TYPE 2 DIABETES MELLITUS: ICD-10-CM

## 2019-03-05 DIAGNOSIS — G35 MS (MULTIPLE SCLEROSIS) (H): ICD-10-CM

## 2019-03-05 DIAGNOSIS — N17.9 ACUTE RENAL FAILURE, UNSPECIFIED ACUTE RENAL FAILURE TYPE (H): ICD-10-CM

## 2019-03-05 DIAGNOSIS — I10 ESSENTIAL HYPERTENSION: ICD-10-CM

## 2019-03-05 DIAGNOSIS — R65.21 SEPTIC SHOCK DUE TO URINARY TRACT INFECTION (H): Primary | ICD-10-CM

## 2019-03-05 DIAGNOSIS — N39.0 SEPTIC SHOCK DUE TO URINARY TRACT INFECTION (H): Primary | ICD-10-CM

## 2019-03-05 DIAGNOSIS — A41.9 SEPTIC SHOCK DUE TO URINARY TRACT INFECTION (H): Primary | ICD-10-CM

## 2019-03-05 DIAGNOSIS — S72.001G CLOSED FRACTURE OF NECK OF RIGHT FEMUR WITH DELAYED HEALING, SUBSEQUENT ENCOUNTER: ICD-10-CM

## 2019-03-05 PROCEDURE — 99309 SBSQ NF CARE MODERATE MDM 30: CPT | Performed by: INTERNAL MEDICINE

## 2019-03-05 PROCEDURE — 99207 ZZC CDG-HISTORY COMP: MEETS EXP. PROBLEM FOCUSED - DOWN CODED LACK OF HPI: CPT | Performed by: INTERNAL MEDICINE

## 2019-03-05 ASSESSMENT — MIFFLIN-ST. JEOR: SCORE: 1775.68

## 2019-03-05 NOTE — LETTER
3/5/2019        RE: Amanda Rcihardson  [address] [place] [address]          PRIMARY CARE PROVIDER AND CLINIC RESPONSIBLE:  Julius Hassan, [address] [facility] / [facility] [id number]        ADMISSION HISTORY AND PHYSICAL EXAMINATION     Chief Complaint   Patient presents with     Hospital F/U         HISTORY OF PRESENT ILLNESS:  53 year old female, (1965), [facility] and [facility].    Pt admitted [facility] 1/30 to 2/27 for AMS and weakness, found to have septic shock with resp failure, s/p MV.     Pt denies any abdominal pain/fever/chills/chest pain/SOB. Feels is getting stronger with therapies.     Please see Gabriela Rodas 's CNP admit noted dated 3/1 for details of admission, past medical history, family history, allergies, medication list, social history and other details pertinent with this admission. Hospital admission and dc summary reviewed.      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 Cystoscopy 2. LEFT retrograde pyelogram 3. LEFT JJ stent placement 4. <1hr physician fluoroscopy time;  Surgeon: Epifanio Sapp MD;  Location: [facility] OR     FUSION LUMBAR ANTERIOR, FUSION LUMBAR POSTERIOR TWO LEVELS, COMBINED  10/17/2013    lumbar fusion - Dr Floyd     INSERT PUMP BACLOFEN  04/2017    intrathecal baclofen pump implantation     IRRIGATION AND DEBRIDEMENT LOWER EXTREMITY, COMBINED Right 2015    Right ankle I&D d/t infection     OPEN REDUCTION INTERNAL FIXATION ANKLE  5/15    Right Bimalleolar ankle fx ORIF     OPEN REDUCTION INTERNAL FIXATION ANKLE Right 11/2015    Revision due to spasms pulling screws out of ankle     SINUS SURGERY  2011    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     XR LUMBAR EPIDURAL INJECTION INCL IMAGING  3/14    Left L4-5 Epidural Dr Winter       Current Outpatient Medications   Medication Sig     amitriptyline (ELAVIL) 100 MG tablet Take 1 tablet (100 mg) by mouth At Bedtime     ASPIRIN PO Take 162 mg by mouth daily     atorvastatin (LIPITOR) 10 MG tablet Take 1 tablet (10 mg) by mouth daily     baclofen (LIORESAL) 10 MG tablet Take 1 tablet (10 mg) by mouth 3 times daily May take an additional 10 mg as needed qd for total of 40 mg per day     bisacodyl (DULCOLAX) 10 MG suppository Place 1 suppository (10 mg) rectally daily as needed for constipation     cholecalciferol 1000 units TABS Take 1,000 Units by mouth daily     docusate sodium (COLACE) 100 MG capsule Take 100 mg by mouth 2 times daily as needed for constipation     DULoxetine (CYMBALTA) 60 MG capsule Take 60  mg by mouth 2 times daily     furosemide (LASIX) 40 MG tablet Take 1 tablet (40 mg) by mouth daily     gabapentin (NEURONTIN) 600 MG tablet Take 1 tablet (600 mg) by mouth 3 times daily     hydrochlorothiazide (MICROZIDE) 12.5 MG capsule 1 capsule (12.5 mg) by Oral or Feeding Tube route daily     losartan (COZAAR) 50 MG tablet Take 1 tablet (50 mg) by mouth daily     metFORMIN (GLUCOPHAGE) 500 MG tablet Take 1 tablet (500 mg) by mouth daily (with dinner)     metoprolol succinate (TOPROL-XL) 50 MG 24 hr tablet TAKE 1 TABLET BY MOUTH ONCE DAILY     Multiple Vitamin (MULTI-VITAMIN PO) Take 1 tablet by mouth daily      omega-3 fatty acids (FISH OIL) 1200 MG capsule Take 1 capsule by mouth daily.     oxyCODONE IR (ROXICODONE) 15 MG tablet Take 0.5-1 tablets (7.5-15 mg) by mouth every 4 hours as needed for moderate to severe pain Limit 4 tablet(s) per day.     polyethylene glycol (MIRALAX/GLYCOLAX) packet Take 17 g by mouth 2 times daily as needed (constipation)     senna-docusate (SENOKOT-S/PERICOLACE) 8.6-50 MG tablet Take 3 tablets by mouth 2 times daily     No current facility-administered medications for this visit.        Allergies   Allergen Reactions     Lisinopril      Lip swelling     Cyclobenzaprine Other (See Comments)     Per 4-10-17 H&P by Yanna Dugan PA-C.     Flexeril [Cyclobenzaprine Hcl]      Got confused        Social History     Socioeconomic History     Marital status:      Spouse name: Fernando     Number of children: 3     Years of education: 14     Highest education level: Not on file   Occupational History     Employer: UNEMPLOYED   Social Needs     Financial resource strain: Not on file     Food insecurity:     Worry: Not on file     Inability: Not on file     Transportation needs:     Medical: Not on file     Non-medical: Not on file   Tobacco Use     Smoking status: Current Every Day Smoker     Packs/day: 0.50     Types: Cigarettes     Last attempt to quit: 8/4/2013     Years since  "quittin.5     Smokeless tobacco: Never Used     Tobacco comment: since age 17   Substance and Sexual Activity     Alcohol use: No     Drug use: No     Sexual activity: Yes     Partners: Male   Lifestyle     Physical activity:     Days per week: Not on file     Minutes per session: Not on file     Stress: Not on file   Relationships     Social connections:     Talks on phone: Not on file     Gets together: Not on file     Attends Yarsani service: Not on file     Active member of club or organization: Not on file     Attends meetings of clubs or organizations: Not on file     Relationship status: Not on file     Intimate partner violence:     Fear of current or ex partner: Not on file     Emotionally abused: Not on file     Physically abused: Not on file     Forced sexual activity: Not on file   Other Topics Concern     Parent/sibling w/ CABG, MI or angioplasty before 65F 55M? No      Service Not Asked     Blood Transfusions Not Asked     Caffeine Concern Yes     Comment: occas     Occupational Exposure Not Asked     Hobby Hazards Not Asked     Sleep Concern Not Asked     Stress Concern Not Asked     Weight Concern Not Asked     Special Diet Not Asked     Back Care Not Asked     Exercise Yes     Comment: as able     Bike Helmet Not Asked     Seat Belt Yes     Self-Exams Not Asked   Social History Narrative     Not on file          Information reviewed:  Medications, vital signs, orders, nursing notes, problem list, hospital information.     ROS: All 10 point review of system completed, those pertinent positive, please see H&P, the remaining ROS is negative.    /63   Pulse 83   Temp 98.6  F (37  C)   Resp 18   Ht 1.727 m (5' 8\")   Wt 113.2 kg (249 lb 9.6 oz)   LMP 2011   SpO2 95%   BMI 37.95 kg/m       PHYSICAL EXAMINATION:   GENERAL:  No acute distress. Laying in bed.  SKIN:  Dry and warm.  There is no rash, lesions, ulcers or juandice at area of skin examined.  HEENT:  Head without " trauma.  Pupils round, reactive. Exam of conjunctiva and lids are normal. Sclera without icterus. There is no oral thrush.  NECK:  Supple.  There is no cervical adenopathy, no thyromegaly. No jugular venous distension.  CHEST: No reproducible chest tenderness.   LUNGS:  Normal respiratory effort. Lungs are Clear on ascultation.  HEART:  Regular rate and rhythm.  No murmur, gallops or rubs auscultated.  ABDOMEN:  Soft, bowel sounds positive.  There is no tenderness or guarding.   EXTREMITIES: No edema. Normal range of motion. No calf swelling or tenderness.  NEUROLOGIC:  Alert and oriented x3.  + B LE in context of MS.    Lab/Diagnostic data:  Reviewed    Lab Results   Component Value Date    WBC 11.7 02/19/2019     Lab Results   Component Value Date    RBC 3.88 02/19/2019     Lab Results   Component Value Date    HGB 10.1 02/19/2019     Lab Results   Component Value Date    HCT 34.0 02/19/2019     Lab Results   Component Value Date    MCV 88 02/19/2019     Lab Results   Component Value Date    MCH 26.0 02/19/2019     Lab Results   Component Value Date    MCHC 29.7 02/19/2019     Lab Results   Component Value Date    RDW 18.6 02/19/2019     Lab Results   Component Value Date     02/27/2019       Last Comprehensive Metabolic Panel:  Sodium   Date Value Ref Range Status   02/19/2019 139 133 - 144 mmol/L Final     Potassium   Date Value Ref Range Status   02/25/2019 4.3 3.4 - 5.3 mmol/L Final     Chloride   Date Value Ref Range Status   02/19/2019 101 94 - 109 mmol/L Final     Carbon Dioxide   Date Value Ref Range Status   02/19/2019 35 (H) 20 - 32 mmol/L Final     Anion Gap   Date Value Ref Range Status   02/19/2019 3 3 - 14 mmol/L Final     Glucose   Date Value Ref Range Status   02/19/2019 91 70 - 99 mg/dL Final     Urea Nitrogen   Date Value Ref Range Status   02/19/2019 21 7 - 30 mg/dL Final     Creatinine   Date Value Ref Range Status   02/19/2019 0.71 0.52 - 1.04 mg/dL Final     GFR Estimate   Date Value  Ref Range Status   02/19/2019 >90 >60 mL/min/[1.73_m2] Final     Comment:     Non  GFR Calc  Starting 12/18/2018, serum creatinine based estimated GFR (eGFR) will be   calculated using the Chronic Kidney Disease Epidemiology Collaboration   (CKD-EPI) equation.       Calcium   Date Value Ref Range Status   02/19/2019 8.6 8.5 - 10.1 mg/dL Final       ASSESSMENT / PLAN:     Septic shock due to urinary tract infection (H)  - blood Cx grew E.coli which was pan-sensitive and intermediate sensitivity to aminoglycosides.  - s/p Abx.  - CT A/P showed mild L hydronephrosis due to prox ureteral stones, s/p cystoscopy and L JJ stent placement.  - f/u with urology for stent retrieval.    Acute respiratory failure with hypoxia and hypercapnia (H)  - Due to above.  - resolved.    Acute renal failure, unspecified acute renal failure type (H)  - due to above.  - resolved.    Borderline type 2 diabetes mellitus  - ADA diet.  - On metformin.    MS (multiple sclerosis) (H)  - On baclofen.    Closed fracture of neck of right femur with delayed healing, subsequent encounter  - noted on CT scan.  - non-op management.    Essential hypertension  - On metoprolol, cozaar, lasix and hydrochlorothiazide.    Hyperlipidemia, LDL goal < 70.  - on lipitor.    Depression, unspecified.  - On cymbalta.    Physical deconditioning      Other problems with same care. Primary care doctor and other specialists to address those chronic problems in next clinic appointment to be scheduled upon discharge from the TCU.    Total time spent with patient visit was 45 min including patient visit, review of past records, 1/2 time on patients counseling and coordinating care.            Sincerely,        Francisco Javier Briones MD

## 2019-03-05 NOTE — PROGRESS NOTES
PRIMARY CARE PROVIDER AND CLINIC RESPONSIBLE:  Julius Hassan, [address] [facility] / [facility] [address]        ADMISSION HISTORY AND PHYSICAL EXAMINATION     Chief Complaint   Patient presents with     Hospital F/U         HISTORY OF PRESENT ILLNESS:  53 year old female, (1965), admitted to the [facility] TCU for continuation of medical care and rehab.    Pt admitted [facility] 1/30 to 2/27 for AMS and weakness, found to have septic shock with resp failure, s/p MV.     Pt denies any abdominal pain/fever/chills/chest pain/SOB. Feels is getting stronger with therapies.     Please see Gabriela Rodas 's CNP admit noted dated 3/1 for details of admission, past medical history, family history, allergies, medication list, social history and other details pertinent with this admission. Hospital admission and dc summary reviewed.      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 Cystoscopy 2. LEFT retrograde pyelogram 3. LEFT JJ stent placement 4. <1hr physician fluoroscopy time;  Surgeon: Epifanio Sapp MD;  Location: [facility] OR     FUSION LUMBAR ANTERIOR, FUSION LUMBAR POSTERIOR TWO LEVELS, COMBINED  10/17/2013    lumbar fusion - Dr Floyd     INSERT PUMP BACLOFEN  04/2017    intrathecal baclofen pump implantation     IRRIGATION AND DEBRIDEMENT LOWER EXTREMITY, COMBINED Right 2015    Right ankle I&D d/t infection     OPEN REDUCTION INTERNAL FIXATION ANKLE  5/15    Right Bimalleolar ankle fx ORIF     OPEN REDUCTION INTERNAL FIXATION ANKLE Right 11/2015    Revision due to spasms pulling screws out of ankle     SINUS SURGERY  2011    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     XR LUMBAR EPIDURAL INJECTION INCL IMAGING  3/14    Left L4-5 Epidural Dr Winter       Current Outpatient Medications   Medication Sig     amitriptyline (ELAVIL) 100 MG tablet Take 1 tablet (100 mg) by mouth At Bedtime     ASPIRIN PO Take 162 mg by mouth daily     atorvastatin (LIPITOR) 10 MG tablet Take 1 tablet (10 mg) by mouth daily     baclofen (LIORESAL) 10 MG tablet Take 1 tablet (10 mg) by mouth 3 times daily May take an additional 10 mg as needed qd for total of 40 mg per day     bisacodyl (DULCOLAX) 10 MG suppository Place 1 suppository (10 mg) rectally daily as needed for constipation     cholecalciferol 1000 units TABS Take 1,000 Units by mouth daily     docusate sodium (COLACE) 100 MG capsule Take 100 mg by mouth 2 times daily as needed for constipation     DULoxetine (CYMBALTA) 60 MG capsule Take 60 mg by mouth 2 times daily     furosemide (LASIX) 40 MG tablet Take 1 tablet (40 mg) by  mouth daily     gabapentin (NEURONTIN) 600 MG tablet Take 1 tablet (600 mg) by mouth 3 times daily     hydrochlorothiazide (MICROZIDE) 12.5 MG capsule 1 capsule (12.5 mg) by Oral or Feeding Tube route daily     losartan (COZAAR) 50 MG tablet Take 1 tablet (50 mg) by mouth daily     metFORMIN (GLUCOPHAGE) 500 MG tablet Take 1 tablet (500 mg) by mouth daily (with dinner)     metoprolol succinate (TOPROL-XL) 50 MG 24 hr tablet TAKE 1 TABLET BY MOUTH ONCE DAILY     Multiple Vitamin (MULTI-VITAMIN PO) Take 1 tablet by mouth daily      omega-3 fatty acids (FISH OIL) 1200 MG capsule Take 1 capsule by mouth daily.     oxyCODONE IR (ROXICODONE) 15 MG tablet Take 0.5-1 tablets (7.5-15 mg) by mouth every 4 hours as needed for moderate to severe pain Limit 4 tablet(s) per day.     polyethylene glycol (MIRALAX/GLYCOLAX) packet Take 17 g by mouth 2 times daily as needed (constipation)     senna-docusate (SENOKOT-S/PERICOLACE) 8.6-50 MG tablet Take 3 tablets by mouth 2 times daily     No current facility-administered medications for this visit.        Allergies   Allergen Reactions     Lisinopril      Lip swelling     Cyclobenzaprine Other (See Comments)     Per 4-10-17 H&P by Yanna Dugan PA-C.     Flexeril [Cyclobenzaprine Hcl]      Got confused        Social History     Socioeconomic History     Marital status:      Spouse name: Fernando     Number of children: 3     Years of education: 14     Highest education level: Not on file   Occupational History     Employer: UNEMPLOYED   Social Needs     Financial resource strain: Not on file     Food insecurity:     Worry: Not on file     Inability: Not on file     Transportation needs:     Medical: Not on file     Non-medical: Not on file   Tobacco Use     Smoking status: Current Every Day Smoker     Packs/day: 0.50     Types: Cigarettes     Last attempt to quit: 2013     Years since quittin.5     Smokeless tobacco: Never Used     Tobacco comment: since age 17   Substance  "and Sexual Activity     Alcohol use: No     Drug use: No     Sexual activity: Yes     Partners: Male   Lifestyle     Physical activity:     Days per week: Not on file     Minutes per session: Not on file     Stress: Not on file   Relationships     Social connections:     Talks on phone: Not on file     Gets together: Not on file     Attends Synagogue service: Not on file     Active member of club or organization: Not on file     Attends meetings of clubs or organizations: Not on file     Relationship status: Not on file     Intimate partner violence:     Fear of current or ex partner: Not on file     Emotionally abused: Not on file     Physically abused: Not on file     Forced sexual activity: Not on file   Other Topics Concern     Parent/sibling w/ CABG, MI or angioplasty before 65F 55M? No      Service Not Asked     Blood Transfusions Not Asked     Caffeine Concern Yes     Comment: occas     Occupational Exposure Not Asked     Hobby Hazards Not Asked     Sleep Concern Not Asked     Stress Concern Not Asked     Weight Concern Not Asked     Special Diet Not Asked     Back Care Not Asked     Exercise Yes     Comment: as able     Bike Helmet Not Asked     Seat Belt Yes     Self-Exams Not Asked   Social History Narrative     Not on file          Information reviewed:  Medications, vital signs, orders, nursing notes, problem list, hospital information.     ROS: All 10 point review of system completed, those pertinent positive, please see H&P, the remaining ROS is negative.    /63   Pulse 83   Temp 98.6  F (37  C)   Resp 18   Ht 1.727 m (5' 8\")   Wt 113.2 kg (249 lb 9.6 oz)   LMP 12/11/2011   SpO2 95%   BMI 37.95 kg/m      PHYSICAL EXAMINATION:   GENERAL:  No acute distress. Laying in bed.  SKIN:  Dry and warm.  There is no rash, lesions, ulcers or juandice at area of skin examined.  HEENT:  Head without trauma.  Pupils round, reactive. Exam of conjunctiva and lids are normal. Sclera without " icterus. There is no oral thrush.  NECK:  Supple.  There is no cervical adenopathy, no thyromegaly. No jugular venous distension.  CHEST: No reproducible chest tenderness.   LUNGS:  Normal respiratory effort. Lungs are Clear on ascultation.  HEART:  Regular rate and rhythm.  No murmur, gallops or rubs auscultated.  ABDOMEN:  Soft, bowel sounds positive.  There is no tenderness or guarding.   EXTREMITIES: No edema. Normal range of motion. No calf swelling or tenderness.  NEUROLOGIC:  Alert and oriented x3.  + B LE in context of MS.    Lab/Diagnostic data:  Reviewed    Lab Results   Component Value Date    WBC 11.7 02/19/2019     Lab Results   Component Value Date    RBC 3.88 02/19/2019     Lab Results   Component Value Date    HGB 10.1 02/19/2019     Lab Results   Component Value Date    HCT 34.0 02/19/2019     Lab Results   Component Value Date    MCV 88 02/19/2019     Lab Results   Component Value Date    MCH 26.0 02/19/2019     Lab Results   Component Value Date    MCHC 29.7 02/19/2019     Lab Results   Component Value Date    RDW 18.6 02/19/2019     Lab Results   Component Value Date     02/27/2019       Last Comprehensive Metabolic Panel:  Sodium   Date Value Ref Range Status   02/19/2019 139 133 - 144 mmol/L Final     Potassium   Date Value Ref Range Status   02/25/2019 4.3 3.4 - 5.3 mmol/L Final     Chloride   Date Value Ref Range Status   02/19/2019 101 94 - 109 mmol/L Final     Carbon Dioxide   Date Value Ref Range Status   02/19/2019 35 (H) 20 - 32 mmol/L Final     Anion Gap   Date Value Ref Range Status   02/19/2019 3 3 - 14 mmol/L Final     Glucose   Date Value Ref Range Status   02/19/2019 91 70 - 99 mg/dL Final     Urea Nitrogen   Date Value Ref Range Status   02/19/2019 21 7 - 30 mg/dL Final     Creatinine   Date Value Ref Range Status   02/19/2019 0.71 0.52 - 1.04 mg/dL Final     GFR Estimate   Date Value Ref Range Status   02/19/2019 >90 >60 mL/min/[1.73_m2] Final     Comment:     Non   American GFR Calc  Starting 12/18/2018, serum creatinine based estimated GFR (eGFR) will be   calculated using the Chronic Kidney Disease Epidemiology Collaboration   (CKD-EPI) equation.       Calcium   Date Value Ref Range Status   02/19/2019 8.6 8.5 - 10.1 mg/dL Final       ASSESSMENT / PLAN:     Septic shock due to urinary tract infection (H)  - blood Cx grew E.coli which was pan-sensitive and intermediate sensitivity to aminoglycosides.  - s/p Abx.  - CT A/P showed mild L hydronephrosis due to prox ureteral stones, s/p cystoscopy and L JJ stent placement.  - f/u with urology for stent retrieval.    Acute respiratory failure with hypoxia and hypercapnia (H)  - Due to above.  - resolved.    Acute renal failure, unspecified acute renal failure type (H)  - due to above.  - resolved.    Borderline type 2 diabetes mellitus  - ADA diet.  - On metformin.    MS (multiple sclerosis) (H)  - On baclofen.    Closed fracture of neck of right femur with delayed healing, subsequent encounter  - noted on CT scan.  - non-op management.    Essential hypertension  - On metoprolol, cozaar, lasix and hydrochlorothiazide.    Hyperlipidemia, LDL goal < 70.  - on lipitor.    Depression, unspecified.  - On cymbalta.    Physical deconditioning      Other problems with same care. Primary care doctor and other specialists to address those chronic problems in next clinic appointment to be scheduled upon discharge from the TCU.    Total time spent with patient visit was 45 min including patient visit, review of past records, 1/2 time on patients counseling and coordinating care.

## 2019-03-06 ENCOUNTER — RECORDS - HEALTHEAST (OUTPATIENT)
Dept: LAB | Facility: CLINIC | Age: 54
End: 2019-03-06

## 2019-03-06 ENCOUNTER — TELEPHONE (OUTPATIENT)
Dept: GERIATRICS | Facility: CLINIC | Age: 54
End: 2019-03-06

## 2019-03-06 LAB
ANION GAP SERPL CALCULATED.3IONS-SCNC: 10 MMOL/L (ref 5–18)
BUN SERPL-MCNC: 24 MG/DL (ref 8–22)
CALCIUM SERPL-MCNC: 9.3 MG/DL (ref 8.5–10.5)
CHLORIDE BLD-SCNC: 97 MMOL/L (ref 98–107)
CO2 SERPL-SCNC: 33 MMOL/L (ref 22–31)
CREAT SERPL-MCNC: 0.78 MG/DL (ref 0.6–1.1)
ERYTHROCYTE [DISTWIDTH] IN BLOOD BY AUTOMATED COUNT: 17.3 % (ref 11–14.5)
GFR SERPL CREATININE-BSD FRML MDRD: >60 ML/MIN/1.73M2
GLUCOSE BLD-MCNC: 96 MG/DL (ref 70–125)
HCT VFR BLD AUTO: 35.5 % (ref 35–47)
HGB BLD-MCNC: 10.8 G/DL (ref 12–16)
MCH RBC QN AUTO: 25.8 PG (ref 27–34)
MCHC RBC AUTO-ENTMCNC: 30.4 G/DL (ref 32–36)
MCV RBC AUTO: 85 FL (ref 80–100)
PLATELET # BLD AUTO: 318 THOU/UL (ref 140–440)
PMV BLD AUTO: 9.1 FL (ref 8.5–12.5)
POTASSIUM BLD-SCNC: 3.2 MMOL/L (ref 3.5–5)
RBC # BLD AUTO: 4.18 MILL/UL (ref 3.8–5.4)
SODIUM SERPL-SCNC: 140 MMOL/L (ref 136–145)
WBC: 15.9 THOU/UL (ref 4–11)

## 2019-03-06 NOTE — TELEPHONE ENCOUNTER
Patient complex but stable, labs today WBC 15.9, K 3.2, temp 98.5, no acute concerns, orders today:  UA/UC stat to rule out UTI  K 20mEq daily starting today for hypokalemia

## 2019-03-07 ENCOUNTER — TRANSFERRED RECORDS (OUTPATIENT)
Dept: HEALTH INFORMATION MANAGEMENT | Facility: CLINIC | Age: 54
End: 2019-03-07

## 2019-03-07 ENCOUNTER — RECORDS - HEALTHEAST (OUTPATIENT)
Dept: LAB | Facility: CLINIC | Age: 54
End: 2019-03-07

## 2019-03-07 DIAGNOSIS — M54.16 LUMBAR RADICULOPATHY: ICD-10-CM

## 2019-03-07 DIAGNOSIS — G35 MS (MULTIPLE SCLEROSIS) (H): ICD-10-CM

## 2019-03-07 DIAGNOSIS — M48.061 SPINAL STENOSIS OF LUMBAR REGION, UNSPECIFIED WHETHER NEUROGENIC CLAUDICATION PRESENT: ICD-10-CM

## 2019-03-07 DIAGNOSIS — G89.4 CHRONIC PAIN SYNDROME: Chronic | ICD-10-CM

## 2019-03-07 LAB
ALBUMIN UR-MCNC: ABNORMAL MG/DL
APPEARANCE UR: ABNORMAL
BACTERIA #/AREA URNS HPF: ABNORMAL HPF
BILIRUB UR QL STRIP: NEGATIVE
COLOR UR AUTO: YELLOW
GLUCOSE UR STRIP-MCNC: NEGATIVE MG/DL
HGB UR QL STRIP: ABNORMAL
HYALINE CASTS #/AREA URNS LPF: ABNORMAL LPF
KETONES UR STRIP-MCNC: NEGATIVE MG/DL
LEUKOCYTE ESTERASE UR QL STRIP: ABNORMAL
MUCOUS THREADS #/AREA URNS LPF: ABNORMAL LPF
NITRATE UR QL: POSITIVE
PH UR STRIP: 5.5 [PH] (ref 4.5–8)
RBC #/AREA URNS AUTO: ABNORMAL HPF
SP GR UR STRIP: 1.01 (ref 1–1.03)
SQUAMOUS #/AREA URNS AUTO: ABNORMAL LPF
UROBILINOGEN UR STRIP-ACNC: ABNORMAL
WBC #/AREA URNS AUTO: >100 HPF
WBC CLUMPS #/AREA URNS HPF: PRESENT /[HPF]

## 2019-03-07 RX ORDER — OXYCODONE HYDROCHLORIDE 15 MG/1
7.5-15 TABLET ORAL EVERY 4 HOURS PRN
Qty: 40 TABLET | Refills: 0 | Status: ON HOLD | OUTPATIENT
Start: 2019-03-07 | End: 2019-03-25

## 2019-03-08 ENCOUNTER — NURSING HOME VISIT (OUTPATIENT)
Dept: GERIATRICS | Facility: CLINIC | Age: 54
End: 2019-03-08
Payer: COMMERCIAL

## 2019-03-08 VITALS
OXYGEN SATURATION: 96 % | RESPIRATION RATE: 18 BRPM | WEIGHT: 249.6 LBS | DIASTOLIC BLOOD PRESSURE: 57 MMHG | HEART RATE: 86 BPM | HEIGHT: 68 IN | BODY MASS INDEX: 37.83 KG/M2 | TEMPERATURE: 98.3 F | SYSTOLIC BLOOD PRESSURE: 97 MMHG

## 2019-03-08 DIAGNOSIS — D64.9 ANEMIA, UNSPECIFIED TYPE: ICD-10-CM

## 2019-03-08 DIAGNOSIS — E87.6 HYPOKALEMIA: ICD-10-CM

## 2019-03-08 DIAGNOSIS — N30.00 ACUTE CYSTITIS WITHOUT HEMATURIA: Primary | ICD-10-CM

## 2019-03-08 DIAGNOSIS — E87.1 HYPONATREMIA: ICD-10-CM

## 2019-03-08 LAB
ANION GAP SERPL CALCULATED.3IONS-SCNC: 12 MMOL/L (ref 5–18)
BUN SERPL-MCNC: 29 MG/DL (ref 8–22)
CALCIUM SERPL-MCNC: 8.8 MG/DL (ref 8.5–10.5)
CHLORIDE BLD-SCNC: 95 MMOL/L (ref 98–107)
CO2 SERPL-SCNC: 27 MMOL/L (ref 22–31)
CREAT SERPL-MCNC: 0.81 MG/DL (ref 0.6–1.1)
ERYTHROCYTE [DISTWIDTH] IN BLOOD BY AUTOMATED COUNT: 17.2 % (ref 11–14.5)
GFR SERPL CREATININE-BSD FRML MDRD: >60 ML/MIN/1.73M2
GLUCOSE BLD-MCNC: 92 MG/DL (ref 70–125)
HCT VFR BLD AUTO: 35.2 % (ref 35–47)
HGB BLD-MCNC: 10.9 G/DL (ref 12–16)
MCH RBC QN AUTO: 26.3 PG (ref 27–34)
MCHC RBC AUTO-ENTMCNC: 31 G/DL (ref 32–36)
MCV RBC AUTO: 85 FL (ref 80–100)
PLATELET # BLD AUTO: 363 THOU/UL (ref 140–440)
PMV BLD AUTO: 9.2 FL (ref 8.5–12.5)
POTASSIUM BLD-SCNC: 3.3 MMOL/L (ref 3.5–5)
RBC # BLD AUTO: 4.14 MILL/UL (ref 3.8–5.4)
SODIUM SERPL-SCNC: 134 MMOL/L (ref 136–145)
WBC: 17.1 THOU/UL (ref 4–11)

## 2019-03-08 PROCEDURE — 99309 SBSQ NF CARE MODERATE MDM 30: CPT | Performed by: NURSE PRACTITIONER

## 2019-03-08 ASSESSMENT — MIFFLIN-ST. JEOR
SCORE: 1775.68
SCORE: 1759.35

## 2019-03-08 NOTE — PROGRESS NOTES
"[place] [facility]  [place] Medical Record Number:  [id number]  Place of Service where encounter took place:  [facility] ([facility] [[id number]]  Chief Complaint   Patient presents with     UTI     Lab Result Notice       HPI:    Amanda Richardson  is a 53 year old (1965), who is being seen today for an episodic care visit.  HPI information obtained from: facility chart records, facility staff and patient report. Today's concern is:     Acute cystitis without hematuria  Hypokalemia  Anemia, unspecified type  Hyponatremia     Patient seen today for f/u on labs and UA results returned last evening. UA appears positive for UTI - patient remains asymptomatic. She notes this worries her in going home as she goes from feeling fine to very ill without any s/sx. We discuss personal monitoring of her urine - looking for concentration, increased odor and change in clarity as these are things she can monitor herself. She denies fever, chills and/or increased fatigue today.   K+ level continues to run low - started on KCl 20mEq - denies cardiac concerns today; Na+ stabilized. Denies fatigue, nausea, vomiting, increased SOB/NOGUERA, and/or b/b concerns today.     Past Medical and Surgical History reviewed in Epic today.                  (3/8)      REVIEW OF SYSTEMS:  4 point ROS including Respiratory, CV, GI and , other than that noted in the HPI,  is negative    Objective:  BP 97/57   Pulse 86   Temp 98.3  F (36.8  C)   Resp 18   Ht 1.727 m (5' 8\")   Wt 113.2 kg (249 lb 9.6 oz)   LMP 12/11/2011   SpO2 96%   BMI 37.95 kg/m     BP: /57-74 mmHg  P: 83-93 bpm    Exam:  GENERAL APPEARANCE:  Alert, in no distress, appears healthy, oriented, cooperative, middle-aged woman upright in w/c in room  ENT:  Mouth and posterior oropharynx normal, moist mucous membranes, [place]  EYES:  EOM, conjunctivae, lids, pupils and irises normal  NECK:  No adenopathy,masses or thyromegaly  RESP:  respiratory " effort and palpation of chest normal, lungs clear to auscultation , no respiratory distress  CV:  Palpation and auscultation of heart done , regular rate and rhythm, no murmur, rub, or gallop, no edema, +2 pedal pulses  ABDOMEN:  normal bowel sounds, soft, nontender, no hepatosplenomegaly or other masses  M/S:   Gait and station abnormal - w/c bound at baseline 2/2 MS; Digits and nails abnormal - contractures of bilateral feet/toes 2/2 MS  Examination of:   right lower extremity and left lower extremity  Inspection, ROM, stability and muscle strength diminished/absent 2/2 MS  SKIN:  Inspection of skin and subcutaneous tissue baseline, Palpation of skin and subcutaneous tissue baseline  NEURO:   Cranial nerves 2-12 are normal tested and grossly at patient's baseline  PSYCH:  oriented X 3, normal insight, judgement and memory, affect and mood normal    Labs:   Recent labs in [facility] reviewed by me today.  - see above    ASSESSMENT/PLAN:  (N30.00) Acute cystitis without hematuria  (primary encounter diagnosis)  Comment: Awaiting UC result at this time - UA positive - patient high risk for rapid onset sepsis w/o sx - WBC continuing to elevate  Plan: Rocephin 1g IM + Lidocaine now; CBC 3/11    (E87.6) Hypokalemia  (E87.1) Hyponatremia  Comment: K+ still low - supplementation started Wednesday; Na+ normalized  Plan: BMP 3/11    (D64.9) Anemia, unspecified type  Comment: Hgb improving  Plan: CBC as above    Electronically signed by:  BAYLEE Peters, [facility]  Phone: [phone]  Fax: [phone]

## 2019-03-08 NOTE — LETTER
"    3/8/2019        RE: Amanda Richardson  [address] [place] [address]        [place] [facility]  [place] [facility] Record Number:  [id number]  Place of Service where encounter took place:  [facility] ([facility] [[id number]]  Chief Complaint   Patient presents with     UTI     Lab Result Notice       HPI:    Amanda Richardson  is a 53 year old (1965), who is being seen today for an episodic care visit.  HPI information obtained from: facility chart records, facility staff and patient report. Today's concern is:     Acute cystitis without hematuria  Hypokalemia  Anemia, unspecified type  Hyponatremia     Patient seen today for f/u on labs and UA results returned last evening. UA appears positive for UTI - patient remains asymptomatic. She notes this worries her in going home as she goes from feeling fine to very ill without any s/sx. We discuss personal monitoring of her urine - looking for concentration, increased odor and change in clarity as these are things she can monitor herself. She denies fever, chills and/or increased fatigue today.   K+ level continues to run low - started on KCl 20mEq - denies cardiac concerns today; Na+ stabilized. Denies fatigue, nausea, vomiting, increased SOB/NOGUERA, and/or b/b concerns today.     Past Medical and Surgical History reviewed in Epic today.                  (3/8)      REVIEW OF SYSTEMS:  4 point ROS including Respiratory, CV, GI and , other than that noted in the HPI,  is negative    Objective:  BP 97/57   Pulse 86   Temp 98.3  F (36.8  C)   Resp 18   Ht 1.727 m (5' 8\")   Wt 113.2 kg (249 lb 9.6 oz)   LMP 12/11/2011   SpO2 96%   BMI 37.95 kg/m      BP: /57-74 mmHg  P: 83-93 bpm    Exam:  GENERAL APPEARANCE:  Alert, in no distress, appears healthy, oriented, cooperative, middle-aged woman upright in w/c in room  ENT:  Mouth and posterior oropharynx normal, moist mucous membranes, [place]  EYES:  EOM, conjunctivae, " lids, pupils and irises normal  NECK:  No adenopathy,masses or thyromegaly  RESP:  respiratory effort and palpation of chest normal, lungs clear to auscultation , no respiratory distress  CV:  Palpation and auscultation of heart done , regular rate and rhythm, no murmur, rub, or gallop, no edema, +2 pedal pulses  ABDOMEN:  normal bowel sounds, soft, nontender, no hepatosplenomegaly or other masses  M/S:   Gait and station abnormal - w/c bound at baseline 2/2 MS; Digits and nails abnormal - contractures of bilateral feet/toes 2/2 MS  Examination of:   right lower extremity and left lower extremity  Inspection, ROM, stability and muscle strength diminished/absent 2/2 MS  SKIN:  Inspection of skin and subcutaneous tissue baseline, Palpation of skin and subcutaneous tissue baseline  NEURO:   Cranial nerves 2-12 are normal tested and grossly at patient's baseline  PSYCH:  oriented X 3, normal insight, judgement and memory, affect and mood normal    Labs:   Recent labs in [facility] reviewed by me today.  - see above    ASSESSMENT/PLAN:  (N30.00) Acute cystitis without hematuria  (primary encounter diagnosis)  Comment: Awaiting UC result at this time - UA positive - patient high risk for rapid onset sepsis w/o sx - WBC continuing to elevate  Plan: Rocephin 1g IM + Lidocaine now; CBC 3/11    (E87.6) Hypokalemia  (E87.1) Hyponatremia  Comment: K+ still low - supplementation started Wednesday; Na+ normalized  Plan: BMP 3/11    (D64.9) Anemia, unspecified type  Comment: Hgb improving  Plan: CBC as above    Electronically signed by:  BAYLEE Peters, [facility]  Phone: [phone]  Fax: [phone]            Sincerely,        BAYLEE Ruiz CNP

## 2019-03-10 ENCOUNTER — TELEPHONE (OUTPATIENT)
Dept: GERIATRICS | Facility: CLINIC | Age: 54
End: 2019-03-10

## 2019-03-10 ENCOUNTER — RECORDS - HEALTHEAST (OUTPATIENT)
Dept: LAB | Facility: CLINIC | Age: 54
End: 2019-03-10

## 2019-03-10 LAB — BACTERIA SPEC CULT: ABNORMAL

## 2019-03-10 NOTE — TELEPHONE ENCOUNTER
UC back and shows ,000 pseudomonas. Had one dose of Rocephin this weekend     PLAN  Keflex 500 mg PO TID x 7 days.     Electronically signed by BAYLEE Velasquez GNP

## 2019-03-11 ENCOUNTER — TRANSFERRED RECORDS (OUTPATIENT)
Dept: HEALTH INFORMATION MANAGEMENT | Facility: CLINIC | Age: 54
End: 2019-03-11

## 2019-03-11 LAB
ANION GAP SERPL CALCULATED.3IONS-SCNC: 14 MMOL/L (ref 5–18)
ANION GAP SERPL CALCULATED.3IONS-SCNC: 14 MMOL/L (ref 5–18)
BUN SERPL-MCNC: 20 MG/DL (ref 8–22)
BUN SERPL-MCNC: 20 MG/DL (ref 8–22)
CALCIUM SERPL-MCNC: 8.8 MG/DL (ref 8.5–10.5)
CALCIUM SERPL-MCNC: 8.8 MG/DL (ref 8.5–10.5)
CHLORIDE BLD-SCNC: 99 MMOL/L (ref 98–107)
CHLORIDE SERPLBLD-SCNC: 99 MMOL/L (ref 98–107)
CO2 SERPL-SCNC: 27 MMOL/L (ref 22–31)
CO2 SERPL-SCNC: 27 MMOL/L (ref 22–31)
CREAT SERPL-MCNC: 0.68 MG/DL (ref 0.6–1.1)
CREAT SERPL-MCNC: 0.68 MG/DL (ref 0.6–1.1)
ERYTHROCYTE [DISTWIDTH] IN BLOOD BY AUTOMATED COUNT: 17.2 % (ref 11–14.5)
ERYTHROCYTE [DISTWIDTH] IN BLOOD BY AUTOMATED COUNT: 17.2 % (ref 11–14.5)
GFR SERPL CREATININE-BSD FRML MDRD: >60 ML/MIN/1.73M2
GFR SERPL CREATININE-BSD FRML MDRD: >60 ML/MIN/1.73M2
GLUCOSE BLD-MCNC: 82 MG/DL (ref 70–125)
GLUCOSE SERPL-MCNC: 82 MG/DL (ref 70–125)
HCT VFR BLD AUTO: 33.9 % (ref 35–47)
HCT VFR BLD AUTO: 33.9 % (ref 35–47)
HEMOGLOBIN: 10.1 G/DL (ref 12–16)
HGB BLD-MCNC: 10.1 G/DL (ref 12–16)
MCH RBC QN AUTO: 26 PG (ref 27–34)
MCH RBC QN AUTO: 26 PG (ref 27–34)
MCHC RBC AUTO-ENTMCNC: 29.8 G/DL (ref 32–36)
MCHC RBC AUTO-ENTMCNC: 29.8 G/DL (ref 32–36)
MCV RBC AUTO: 87 FL (ref 80–100)
MCV RBC AUTO: 87 FL (ref 80–100)
PLATELET # BLD AUTO: 346 THOU/UL (ref 140–440)
PLATELET # BLD AUTO: 346 THOU/UL (ref 140–440)
PMV BLD AUTO: 9.3 FL (ref 8.5–12.5)
POTASSIUM BLD-SCNC: 3.6 MMOL/L (ref 3.5–5)
POTASSIUM SERPL-SCNC: 3.6 MMOL/L (ref 3.5–5)
RBC # BLD AUTO: 3.88 MILL/UL (ref 3.8–5.4)
RBC # BLD AUTO: 3.88 MILL/UL (ref 3.8–5.4)
SODIUM SERPL-SCNC: 140 MMOL/L (ref 136–145)
SODIUM SERPL-SCNC: 140 MMOL/L (ref 136–145)
WBC # BLD AUTO: 13.2 THOU/UL (ref 4–11)
WBC: 13.2 THOU/UL (ref 4–11)

## 2019-03-13 ENCOUNTER — NURSING HOME VISIT (OUTPATIENT)
Dept: GERIATRICS | Facility: CLINIC | Age: 54
End: 2019-03-13
Payer: COMMERCIAL

## 2019-03-13 ENCOUNTER — RECORDS - HEALTHEAST (OUTPATIENT)
Dept: LAB | Facility: CLINIC | Age: 54
End: 2019-03-13

## 2019-03-13 VITALS
TEMPERATURE: 97 F | HEIGHT: 68 IN | HEART RATE: 83 BPM | DIASTOLIC BLOOD PRESSURE: 60 MMHG | BODY MASS INDEX: 38.01 KG/M2 | SYSTOLIC BLOOD PRESSURE: 142 MMHG | RESPIRATION RATE: 18 BRPM | WEIGHT: 250.8 LBS | OXYGEN SATURATION: 96 %

## 2019-03-13 DIAGNOSIS — N30.00 ACUTE CYSTITIS WITHOUT HEMATURIA: Primary | ICD-10-CM

## 2019-03-13 DIAGNOSIS — R53.81 PHYSICAL DECONDITIONING: ICD-10-CM

## 2019-03-13 DIAGNOSIS — I10 ESSENTIAL HYPERTENSION: ICD-10-CM

## 2019-03-13 DIAGNOSIS — K59.00 CONSTIPATION, UNSPECIFIED CONSTIPATION TYPE: ICD-10-CM

## 2019-03-13 DIAGNOSIS — E87.6 HYPOKALEMIA: ICD-10-CM

## 2019-03-13 DIAGNOSIS — R73.03 BORDERLINE TYPE 2 DIABETES MELLITUS: ICD-10-CM

## 2019-03-13 DIAGNOSIS — N20.0 KIDNEY STONE: ICD-10-CM

## 2019-03-13 DIAGNOSIS — G35 MS (MULTIPLE SCLEROSIS) (H): ICD-10-CM

## 2019-03-13 DIAGNOSIS — S72.001G CLOSED FRACTURE OF NECK OF RIGHT FEMUR WITH DELAYED HEALING, SUBSEQUENT ENCOUNTER: ICD-10-CM

## 2019-03-13 DIAGNOSIS — D64.9 ANEMIA, UNSPECIFIED TYPE: ICD-10-CM

## 2019-03-13 DIAGNOSIS — I48.92 NEW ONSET ATRIAL FLUTTER (H): ICD-10-CM

## 2019-03-13 PROCEDURE — 99310 SBSQ NF CARE HIGH MDM 45: CPT | Performed by: NURSE PRACTITIONER

## 2019-03-13 RX ORDER — POLYETHYLENE GLYCOL 3350 17 G/17G
17 POWDER, FOR SOLUTION ORAL DAILY
Start: 2019-03-13 | End: 2019-03-16

## 2019-03-13 RX ORDER — CEPHALEXIN 500 MG/1
500 CAPSULE ORAL 3 TIMES DAILY
Status: ON HOLD | COMMUNITY
Start: 2019-03-10 | End: 2019-03-25

## 2019-03-13 ASSESSMENT — MIFFLIN-ST. JEOR: SCORE: 1781.12

## 2019-03-13 NOTE — LETTER
3/13/2019        RE: Amanda Richardson  [address] [place] [address]        [facility]  [facility] Record Number:  [id number]  Place of Service where encounter took place:  [facility] [place] ([facility]) [[id number]]  Chief Complaint   Patient presents with     RECHECK       HPI:    Amanda Richardson  is a 53 year old (1965), who is being seen today for an episodic care visit.  HPI information obtained from: facility chart records, facility staff, patient report and [facility] chart review. Today's concern is:  1. Acute cystitis without hematuria    2. Hypokalemia    3. Anemia, unspecified type    4. New onset atrial flutter (H)    5. Borderline type 2 diabetes mellitus    6. MS (multiple sclerosis) (H)    7. Essential hypertension    8. Closed fracture of neck of right femur with delayed healing, subsequent encounter    9. Constipation, unspecified constipation type    10. Kidney stone    11. Physical deconditioning      Ms. Richardson seen today for follow up visit while at TCU. Diagnosed with UTI while at TCU- is now on keflex x7 day course. Reports she is doing ok. She is very worried about these recurrent UTI, since she is asymptomatic and then gets very ill (as in recent hospitalization). She tells me over the past year she has had recurrent UTIs- at least every 3 months. We discussed recommendation that she follow up with urologist. She denies any pain or blood in urine, no dysuria, or trouble voiding. She is scheduled to have kidney stone removed on Friday-  She tells me it was incidental finding when she was in the hospital. Per nursing staff at facility no preop is needed due to recent hospitalization. She has been afebrile. Discussed that we will monitor her WBC while she is here at TCU, since that was first sign of infection.     Reports bowels are irregular- last BM yesterday, but prior to that had a 5 day run where she had no BM at TCU.  Would  like bowel medications adjusted.     Reports having a hard night last night due to back pain and spasms. She feels she may be working too hard with therapy. She feels with her MS, she may need to slow down a bit. She feels like she is getting stronger and therapy is going well though.     She lives at home with her  and adult son. She has a stair lift at home. Was managing own medication prior to hospitalization.    She is otherwise without complaints.       BP: /56-74 mmHg   P: 80-97 bpm   O2 saturation > 90% on RA     Blood Sugars:   Date: 03/01/2019 - 03/12/2019   Time  Wed  6 Thu  7 Fri  8 Sat  9 Sun  10 Mon  11 Tue  12             08:00   Blood Sugar 103 mg/dL 100 mg/dL 96 mg/dL 107 mg/dL 100 mg/dL 110 mg/dL 126 mg/dL   12:00   Blood Sugar 91 mg/dL 103 mg/dL 100 mg/dL 106 mg/dL 82 mg/dL 107 mg/dL 97 mg/dL   18:00   Blood Sugar 100 mg/dL 224 mg/dL 135 mg/dL 114 mg/dL 102 mg/dL 170 mg/dL 132 mg/dL       Past Medical and Surgical History reviewed in Epic today.    MEDICATIONS:  Current Outpatient Medications   Medication Sig Dispense Refill     amitriptyline (ELAVIL) 100 MG tablet Take 1 tablet (100 mg) by mouth At Bedtime 90 tablet 3     atorvastatin (LIPITOR) 10 MG tablet Take 1 tablet (10 mg) by mouth daily 90 tablet 3     baclofen (LIORESAL) 10 MG tablet Take 1 tablet (10 mg) by mouth 3 times daily May take an additional 10 mg as needed qd for total of 40 mg per day 30 tablet 0     bisacodyl (DULCOLAX) 10 MG suppository Place 1 suppository (10 mg) rectally daily as needed for constipation       cephALEXin (KEFLEX) 500 MG capsule Take 500 mg by mouth 3 times daily       cholecalciferol 1000 units TABS Take 1,000 Units by mouth daily       docusate sodium (COLACE) 100 MG capsule Take 100 mg by mouth 2 times daily as needed for constipation       DULoxetine (CYMBALTA) 60 MG capsule Take 60 mg by mouth 2 times daily       furosemide (LASIX) 40 MG tablet Take 1 tablet (40 mg) by mouth daily 30 tablet 0  "    gabapentin (NEURONTIN) 600 MG tablet Take 1 tablet (600 mg) by mouth 3 times daily 30 tablet 0     hydrochlorothiazide (MICROZIDE) 12.5 MG capsule 1 capsule (12.5 mg) by Oral or Feeding Tube route daily 30 capsule 0     losartan (COZAAR) 50 MG tablet Take 1 tablet (50 mg) by mouth daily 30 tablet 0     metFORMIN (GLUCOPHAGE) 500 MG tablet Take 1 tablet (500 mg) by mouth daily (with dinner) 90 tablet 3     metoprolol succinate (TOPROL-XL) 50 MG 24 hr tablet TAKE 1 TABLET BY MOUTH ONCE DAILY 90 tablet 1     Multiple Vitamin (MULTI-VITAMIN PO) Take 1 tablet by mouth daily        omega-3 fatty acids (FISH OIL) 1200 MG capsule Take 1 capsule by mouth daily.       oxyCODONE IR (ROXICODONE) 15 MG tablet Take 0.5-1 tablets (7.5-15 mg) by mouth every 4 hours as needed for moderate to severe pain Limit 4 tablet(s) per day. 40 tablet 0     polyethylene glycol (MIRALAX/GLYCOLAX) packet Take 17 g by mouth 2 times daily as needed (constipation)       POTASSIUM CHLORIDE ER PO Take 20 mEq by mouth daily       senna-docusate (SENOKOT-S/PERICOLACE) 8.6-50 MG tablet Take 3 tablets by mouth 2 times daily 180 tablet 0     ASPIRIN PO Take 162 mg by mouth daily         REVIEW OF SYSTEMS:  10 point ROS of systems including Constitutional, Eyes, Respiratory, Cardiovascular, Gastroenterology, Genitourinary, Integumentary, Musculoskeletal, Neurological, Psychiatric were all negative except for pertinent positives noted in my HPI.    Objective:  /60   Pulse 83   Temp 97  F (36.1  C)   Resp 18   Ht 1.727 m (5' 8\")   Wt 113.8 kg (250 lb 12.8 oz)   LMP 12/11/2011   SpO2 96%   BMI 38.13 kg/m     Exam:  GENERAL APPEARANCE:  Alert, pleasant and cooperative, sitting in wheelchair on exam today  EYES:  EOM, lids, pupils and irises normal, sclera clear and conjunctiva normal, no discharge or mattering on lids or lashes noted  ENT:  Mouth normal, moist mucous membranes, nose without drainage or crusting, external ears without lesions, " hearing acuity intact  RESP:  respiratory effort normal, no respiratory distress, Lung sounds clear, patient is on RA  CV: auscultation of heart done, rate and rhythm regular. generalized edema- compression stockings to feet  ABDOMEN:  normal bowel sounds, soft, nontender, no grimacing or guarding with palpation.  M/S: gait and station abnormal- in wheelchair at baseline due to MS- able to move extremities freely  SKIN:  Inspection and palpation of skin and subcutaneous tissue: skin warm, dry without rashes  NEURO: cranial nerves 2-12 grossly intact, no facial asymmetry, no speech deficits and able to follow directions, moves all extremities symmetrically  PSYCH:  insight and judgement intact, memory intact, affect and mood normal      Labs:   Labs done in SNF are in [place] EPIC. Please refer to them using [facility] Everywhere.    ASSESSMENT/PLAN:  (N30.00) Acute cystitis without hematuria  (primary encounter diagnosis)  Comment:  Acute, UC positive for ,000 pseudomonas   - patient high risk for rapid onset sepsis w/o sx - WBC was elevated is now improving- recheck pending for tomorrow  Elevated to 17.1 on 3/8; down to 13.2 on 3/11 (baseline)  -with septic shock recently due to urinary tract infection during hospitalization- treated with antibiotics inpatient and completed  - CT A/P showed mild L hydronephrosis due to prox ureteral stones, s/p cystoscopy and L JJ stent placement.  Plan:  CBC pending for tomorrow- will continue to follow- WBC generally runs 13-15 at baseline. Continue keflex through 3/18. Follow up with urology as scheduled 3/29.     (E87.6) Hypokalemia  Comment: Acute, now within normal limits  3/11 potassium was 3.6  -Started on potassium supplementation at TCU  Plan:  continue Kcl 20 meq daily. BMP pending for tomorrow.    (D64.9) Anemia, unspecified type  Comment: Hgb  stable- 10.1 on 3/11  -Baseline hgb 10  Plan: CBC  for tomorrow. Monitor for s.sx of bleeding    (I48.92) New onset  atrial flutter (H)  Comment:  Acute, noted during hospitalization- has not reoccured   -Status post DC cardioversion 1/31/2019  --Cardiac echo when compared to prior study showed minimal deterioration of left ventricular systolic function.  EF 50-55%.  --No anticoagulation for now per cardiology- was in atrial flutter for less than 8 hours. Received 2 weeks amiodarone inpatient.  Plan: Monitor. VS per facility protocol.    (R73.03) Borderline type 2 diabetes mellitus  Comment:  Chronic, BS meeting goal  Lab Results   Component Value Date    A1C 6.6 02/09/2019    A1C 6.4 01/30/2019    A1C 6.8 06/20/2018    A1C 6.5 10/02/2017    A1C 6.6 05/02/2016     Plan: Continue  metformin. Will have nursing contact urology office today to verify ok to take metformin with upcoming procedure on Friday- often it is held. Discontinue QID BS. BS M, Th AM fasting and at bedtime. Carb consistent diet.     (G35) MS (multiple sclerosis) (H)  Comment: Chronic - w/c bound at baseline. Resides at home with  - is mostly independent at home with pivot transferring.   Plan: Therapies as below. PT in TCU to assess for safety to return home     (G89.4) Chronic pain syndrome  Comment: Chronic -reports having a rough night due to pain and back spasms- she feels she worked too hard with therapy yesterday.    Plan: Continue Baclofen, Neurontin, Oxycodone, and Cymbalta.  .   (I10) Hypertension goal BP (blood pressure) < 140/90  Comment: chronic, BP meeting goal   Plan: Based on JNC-8 goals,  patients age of 53 year old, presence of diabetes or CKD, and goals of care goal BP is <150/90 mm Hg. Continue Metoprolol, Losartan, Lasix and hydrochlorothiazide. VS per TCU policy. BMP 3/14 as ordered     (S72.001G) Closed fracture of neck of right femur with delayed healing, subsequent encounter  Comment: Noted during recent hospitalization- reports pain at times  CT from 1/30/19 showed Mildly displaced, impacted right femoral neck fracture,  age  indeterminate, but could be recent.  Plan: Pain control as above. Monitor.  Therapy as below     (K59.00) Constipation, unspecified constipation type  Comment:  Acute, bowels irregular- would like regimen adjusted today  Plan: Senna-S scheduled, Miralax scheduled and PRN, and colace PRN.  Hold for loose stools. Nursing to monitor bowels and use house orders PRN- notify provider if used to adjust daily regimen.     (N20.0) Kidney stone  Comment: Acute, Cystocoscopy scheduled for 3/15.  -orders received at TCU per Dr. Chauhan's office  Plan: Patient is on metformin- nursing to contact office today to verify it is ok she takes this. Other orders as written.     (R53.81) Physical deconditioning  Comment:  Acute, 2/2 above noted diagnoses and recent hospitalizations  Plan:  Encourage participation in physical therapy/occupational therapy for strengthening and deconditioning. Discharge planning per their recommendation. Social work to assist with d/c planning.    (E87.1) Hyponatremia  Acute, now resolved  Monitor- BMP 3/14    (N17.9) Acute renal failure, unspecified acute renal failure type (H)  Comment: Noted inpatient -now resolved  -last creatinine 0.68 on 3/11  Plan: Monitor.    (J96.01,  J96.02) Acute respiratory failure with hypoxia and hypercapnia (H)  Comment: Resolved inpatient - no respiratory concerns noted today. Oxygen levels >90%  Plan: Monitor.      Total time spent with patient visit at the skilled nursing facility was 47 minutes including patient visit, review of past records. Greater than 50% of total time spent with counseling and coordinating care due to UTI, and other problems as above.    Electronically signed by:  BAYLEE Mg CNP           Sincerely,        BAYLEE Mg CNP

## 2019-03-13 NOTE — PROGRESS NOTES
[facility]  [facility] Record Number:  [id number]  Place of Service where encounter took place:  [facility] ([facility]) [[id number]]  Chief Complaint   Patient presents with     RECHECK       HPI:    Amanda Richardson  is a 53 year old (1965), who is being seen today for an episodic care visit.  HPI information obtained from: facility chart records, facility staff, patient report and [facility] chart review. Today's concern is:  1. Acute cystitis without hematuria    2. Hypokalemia    3. Anemia, unspecified type    4. New onset atrial flutter (H)    5. Borderline type 2 diabetes mellitus    6. MS (multiple sclerosis) (H)    7. Essential hypertension    8. Closed fracture of neck of right femur with delayed healing, subsequent encounter    9. Constipation, unspecified constipation type    10. Kidney stone    11. Physical deconditioning      Ms. Richardson seen today for follow up visit while at TCU. Diagnosed with UTI while at TCU- is now on keflex x7 day course. Reports she is doing ok. She is very worried about these recurrent UTI, since she is asymptomatic and then gets very ill (as in recent hospitalization). She tells me over the past year she has had recurrent UTIs- at least every 3 months. We discussed recommendation that she follow up with urologist. She denies any pain or blood in urine, no dysuria, or trouble voiding. She is scheduled to have kidney stone removed on Friday-  She tells me it was incidental finding when she was in the hospital. Per nursing staff at facility no preop is needed due to recent hospitalization. She has been afebrile. Discussed that we will monitor her WBC while she is here at TCU, since that was first sign of infection.     Reports bowels are irregular- last BM yesterday, but prior to that had a 5 day run where she had no BM at TCU.  Would like bowel medications adjusted.     Reports having a hard night last night due to back pain  and spasms. She feels she may be working too hard with therapy. She feels with her MS, she may need to slow down a bit. She feels like she is getting stronger and therapy is going well though.     She lives at home with her  and adult son. She has a stair lift at home. Was managing own medication prior to hospitalization.    She is otherwise without complaints.       BP: /56-74 mmHg   P: 80-97 bpm   O2 saturation > 90% on RA     Blood Sugars:   Date: 03/01/2019 - 03/12/2019   Time  Wed  6 Thu  7 Fri  8 Sat  9 Sun  10 Mon  11 Tue  12             08:00   Blood Sugar 103 mg/dL 100 mg/dL 96 mg/dL 107 mg/dL 100 mg/dL 110 mg/dL 126 mg/dL   12:00   Blood Sugar 91 mg/dL 103 mg/dL 100 mg/dL 106 mg/dL 82 mg/dL 107 mg/dL 97 mg/dL   18:00   Blood Sugar 100 mg/dL 224 mg/dL 135 mg/dL 114 mg/dL 102 mg/dL 170 mg/dL 132 mg/dL       Past Medical and Surgical History reviewed in Epic today.    MEDICATIONS:  Current Outpatient Medications   Medication Sig Dispense Refill     amitriptyline (ELAVIL) 100 MG tablet Take 1 tablet (100 mg) by mouth At Bedtime 90 tablet 3     atorvastatin (LIPITOR) 10 MG tablet Take 1 tablet (10 mg) by mouth daily 90 tablet 3     baclofen (LIORESAL) 10 MG tablet Take 1 tablet (10 mg) by mouth 3 times daily May take an additional 10 mg as needed qd for total of 40 mg per day 30 tablet 0     bisacodyl (DULCOLAX) 10 MG suppository Place 1 suppository (10 mg) rectally daily as needed for constipation       cephALEXin (KEFLEX) 500 MG capsule Take 500 mg by mouth 3 times daily       cholecalciferol 1000 units TABS Take 1,000 Units by mouth daily       docusate sodium (COLACE) 100 MG capsule Take 100 mg by mouth 2 times daily as needed for constipation       DULoxetine (CYMBALTA) 60 MG capsule Take 60 mg by mouth 2 times daily       furosemide (LASIX) 40 MG tablet Take 1 tablet (40 mg) by mouth daily 30 tablet 0     gabapentin (NEURONTIN) 600 MG tablet Take 1 tablet (600 mg) by mouth 3 times daily 30  "tablet 0     hydrochlorothiazide (MICROZIDE) 12.5 MG capsule 1 capsule (12.5 mg) by Oral or Feeding Tube route daily 30 capsule 0     losartan (COZAAR) 50 MG tablet Take 1 tablet (50 mg) by mouth daily 30 tablet 0     metFORMIN (GLUCOPHAGE) 500 MG tablet Take 1 tablet (500 mg) by mouth daily (with dinner) 90 tablet 3     metoprolol succinate (TOPROL-XL) 50 MG 24 hr tablet TAKE 1 TABLET BY MOUTH ONCE DAILY 90 tablet 1     Multiple Vitamin (MULTI-VITAMIN PO) Take 1 tablet by mouth daily        omega-3 fatty acids (FISH OIL) 1200 MG capsule Take 1 capsule by mouth daily.       oxyCODONE IR (ROXICODONE) 15 MG tablet Take 0.5-1 tablets (7.5-15 mg) by mouth every 4 hours as needed for moderate to severe pain Limit 4 tablet(s) per day. 40 tablet 0     polyethylene glycol (MIRALAX/GLYCOLAX) packet Take 17 g by mouth 2 times daily as needed (constipation)       POTASSIUM CHLORIDE ER PO Take 20 mEq by mouth daily       senna-docusate (SENOKOT-S/PERICOLACE) 8.6-50 MG tablet Take 3 tablets by mouth 2 times daily 180 tablet 0     ASPIRIN PO Take 162 mg by mouth daily         REVIEW OF SYSTEMS:  10 point ROS of systems including Constitutional, Eyes, Respiratory, Cardiovascular, Gastroenterology, Genitourinary, Integumentary, Musculoskeletal, Neurological, Psychiatric were all negative except for pertinent positives noted in my HPI.    Objective:  /60   Pulse 83   Temp 97  F (36.1  C)   Resp 18   Ht 1.727 m (5' 8\")   Wt 113.8 kg (250 lb 12.8 oz)   LMP 12/11/2011   SpO2 96%   BMI 38.13 kg/m    Exam:  GENERAL APPEARANCE:  Alert, pleasant and cooperative, sitting in wheelchair on exam today  EYES:  EOM, lids, pupils and irises normal, sclera clear and conjunctiva normal, no discharge or mattering on lids or lashes noted  ENT:  Mouth normal, moist mucous membranes, nose without drainage or crusting, external ears without lesions, hearing acuity intact  RESP:  respiratory effort normal, no respiratory distress, Lung " sounds clear, patient is on RA  CV: auscultation of heart done, rate and rhythm regular. generalized edema- compression stockings to feet  ABDOMEN:  normal bowel sounds, soft, nontender, no grimacing or guarding with palpation.  M/S: gait and station abnormal- in wheelchair at baseline due to MS- able to move extremities freely  SKIN:  Inspection and palpation of skin and subcutaneous tissue: skin warm, dry without rashes  NEURO: cranial nerves 2-12 grossly intact, no facial asymmetry, no speech deficits and able to follow directions, moves all extremities symmetrically  PSYCH:  insight and judgement intact, memory intact, affect and mood normal      Labs:   Labs done in SNF are in [place] EPIC. Please refer to them using [facility] Everywhere.    ASSESSMENT/PLAN:  (N30.00) Acute cystitis without hematuria  (primary encounter diagnosis)  Comment: Acute, UC positive for ,000 pseudomonas   - patient high risk for rapid onset sepsis w/o sx - WBC was elevated is now improving- recheck pending for tomorrow  Elevated to 17.1 on 3/8; down to 13.2 on 3/11 (baseline)  -with septic shock recently due to urinary tract infection during hospitalization- treated with antibiotics inpatient and completed  - CT A/P showed mild L hydronephrosis due to prox ureteral stones, s/p cystoscopy and L JJ stent placement.  Plan: CBC pending for tomorrow- will continue to follow- WBC generally runs 13-15 at baseline. Continue keflex through 3/18. Follow up with urology as scheduled 3/29.     (E87.6) Hypokalemia  Comment: Acute, now within normal limits  3/11 potassium was 3.6  -Started on potassium supplementation at TCU  Plan: continue Kcl 20 meq daily. BMP pending for tomorrow.    (D64.9) Anemia, unspecified type  Comment: Hgb stable- 10.1 on 3/11  -Baseline hgb 10  Plan: CBC for tomorrow. Monitor for s.sx of bleeding    (I48.92) New onset atrial flutter (H)  Comment: Acute, noted during hospitalization- has not reoccured   -Status  post DC cardioversion 1/31/2019  --Cardiac echo when compared to prior study showed minimal deterioration of left ventricular systolic function.  EF 50-55%.  --No anticoagulation for now per cardiology- was in atrial flutter for less than 8 hours. Received 2 weeks amiodarone inpatient.  Plan: Monitor. VS per facility protocol.    (R73.03) Borderline type 2 diabetes mellitus  Comment: Chronic, BS meeting goal  Lab Results   Component Value Date    A1C 6.6 02/09/2019    A1C 6.4 01/30/2019    A1C 6.8 06/20/2018    A1C 6.5 10/02/2017    A1C 6.6 05/02/2016     Plan: Continue metformin. Will have nursing contact urology office today to verify ok to take metformin with upcoming procedure on Friday- often it is held. Discontinue QID BS. BS M, Th AM fasting and at bedtime. Carb consistent diet.     (G35) MS (multiple sclerosis) (H)  Comment: Chronic - w/c bound at baseline. Resides at home with  - is mostly independent at home with pivot transferring.   Plan: Therapies as below. PT in TCU to assess for safety to return home     (G89.4) Chronic pain syndrome  Comment: Chronic -reports having a rough night due to pain and back spasms- she feels she worked too hard with therapy yesterday.    Plan: Continue Baclofen, Neurontin, Oxycodone, and Cymbalta.  .   (I10) Hypertension goal BP (blood pressure) < 140/90  Comment: chronic, BP meeting goal   Plan: Based on JNC-8 goals,  patients age of 53 year old, presence of diabetes or CKD, and goals of care goal BP is <150/90 mm Hg. Continue Metoprolol, Losartan, Lasix and hydrochlorothiazide. VS per TCU policy. BMP 3/14 as ordered     (S72.001G) Closed fracture of neck of right femur with delayed healing, subsequent encounter  Comment: Noted during recent hospitalization- reports pain at times  CT from 1/30/19 showed Mildly displaced, impacted right femoral neck fracture, age  indeterminate, but could be recent.  Plan: Pain control as above. Monitor. Therapy as below     (K59.00)  Constipation, unspecified constipation type  Comment: Acute, bowels irregular- would like regimen adjusted today  Plan: Senna-S scheduled, Miralax scheduled and PRN, and colace PRN. Hold for loose stools. Nursing to monitor bowels and use house orders PRN- notify provider if used to adjust daily regimen.     (N20.0) Kidney stone  Comment: Acute, Cystocoscopy scheduled for 3/15.  -orders received at TCU per Dr. Chauhan's office  Plan: Patient is on metformin- nursing to contact office today to verify it is ok she takes this. Other orders as written.     (R53.81) Physical deconditioning  Comment: Acute, 2/2 above noted diagnoses and recent hospitalizations  Plan: Encourage participation in physical therapy/occupational therapy for strengthening and deconditioning. Discharge planning per their recommendation. Social work to assist with d/c planning.    (E87.1) Hyponatremia  Acute, now resolved  Monitor- BMP 3/14    (N17.9) Acute renal failure, unspecified acute renal failure type (H)  Comment: Noted inpatient -now resolved  -last creatinine 0.68 on 3/11  Plan: Monitor.    (J96.01,  J96.02) Acute respiratory failure with hypoxia and hypercapnia (H)  Comment: Resolved inpatient - no respiratory concerns noted today. Oxygen levels >90%  Plan: Monitor.      Total time spent with patient visit at the skilled nursing facility was 47 minutes including patient visit, review of past records. Greater than 50% of total time spent with counseling and coordinating care due to UTI, and other problems as above.    Electronically signed by:  BAYLEE Mg CNP

## 2019-03-14 ENCOUNTER — TRANSFERRED RECORDS (OUTPATIENT)
Dept: HEALTH INFORMATION MANAGEMENT | Facility: CLINIC | Age: 54
End: 2019-03-14

## 2019-03-14 LAB
ANION GAP SERPL CALCULATED.3IONS-SCNC: 12 MMOL/L (ref 5–18)
ANION GAP SERPL CALCULATED.3IONS-SCNC: 12 MMOL/L (ref 5–18)
BUN SERPL-MCNC: 18 MG/DL (ref 8–22)
BUN SERPL-MCNC: 18 MG/DL (ref 8–22)
CALCIUM SERPL-MCNC: 9 MG/DL (ref 8.5–10.5)
CALCIUM SERPL-MCNC: 9 MG/DL (ref 8.5–10.5)
CHLORIDE BLD-SCNC: 98 MMOL/L (ref 98–107)
CHLORIDE SERPLBLD-SCNC: 98 MMOL/L (ref 98–107)
CO2 SERPL-SCNC: 29 MMOL/L (ref 22–31)
CO2 SERPL-SCNC: 29 MMOL/L (ref 22–31)
CREAT SERPL-MCNC: 0.76 MG/DL (ref 0.6–1.1)
CREAT SERPL-MCNC: 0.76 MG/DL (ref 0.6–1.1)
ERYTHROCYTE [DISTWIDTH] IN BLOOD BY AUTOMATED COUNT: 17.2 % (ref 11–14.5)
ERYTHROCYTE [DISTWIDTH] IN BLOOD BY AUTOMATED COUNT: 17.2 % (ref 11–14.5)
GFR SERPL CREATININE-BSD FRML MDRD: >60 ML/MIN/1.73M2
GFR SERPL CREATININE-BSD FRML MDRD: >60 ML/MIN/1.73M2
GLUCOSE BLD-MCNC: 90 MG/DL (ref 70–125)
GLUCOSE SERPL-MCNC: 90 MG/DL (ref 70–125)
HCT VFR BLD AUTO: 34.6 % (ref 35–47)
HCT VFR BLD AUTO: 34.6 % (ref 35–47)
HEMOGLOBIN: 10.7 G/DL (ref 12–16)
HGB BLD-MCNC: 10.7 G/DL (ref 12–16)
MCH RBC QN AUTO: 26.4 PG (ref 27–34)
MCH RBC QN AUTO: 26.4 PG (ref 27–34)
MCHC RBC AUTO-ENTMCNC: 20.9 G/DL (ref 32–36)
MCHC RBC AUTO-ENTMCNC: 30.9 G/DL (ref 32–36)
MCV RBC AUTO: 85 FL (ref 80–100)
MCV RBC AUTO: 85 FL (ref 80–100)
PLATELET # BLD AUTO: 390 THOU/UL (ref 140–440)
PLATELET # BLD AUTO: 390 THOU/UL (ref 140–440)
PMV BLD AUTO: 9.2 FL (ref 8.5–12.5)
POTASSIUM BLD-SCNC: 3.6 MMOL/L (ref 3.5–5)
POTASSIUM SERPL-SCNC: 3.6 MMOL/L (ref 3.5–5)
RBC # BLD AUTO: 4.06 MILL/UL (ref 3.8–5.4)
RBC # BLD AUTO: 4.06 MILL/UL (ref 3.8–5.4)
SODIUM SERPL-SCNC: 139 MMOL/L (ref 136–145)
SODIUM SERPL-SCNC: 139 MMOL/L (ref 136–145)
WBC # BLD AUTO: 15.4 THOU/UL (ref 4–11)
WBC: 15.4 THOU/UL (ref 4–11)

## 2019-03-15 ENCOUNTER — HOSPITAL ENCOUNTER (OUTPATIENT)
Facility: CLINIC | Age: 54
Discharge: NURSING, REHAB OR LONG-TERM CARE | DRG: 856 | End: 2019-03-15
Attending: UROLOGY | Admitting: UROLOGY
Payer: COMMERCIAL

## 2019-03-15 ENCOUNTER — ANESTHESIA (OUTPATIENT)
Dept: SURGERY | Facility: CLINIC | Age: 54
DRG: 856 | End: 2019-03-15
Payer: COMMERCIAL

## 2019-03-15 ENCOUNTER — ANESTHESIA EVENT (OUTPATIENT)
Dept: SURGERY | Facility: CLINIC | Age: 54
DRG: 856 | End: 2019-03-15
Payer: COMMERCIAL

## 2019-03-15 ENCOUNTER — APPOINTMENT (OUTPATIENT)
Dept: GENERAL RADIOLOGY | Facility: CLINIC | Age: 54
DRG: 856 | End: 2019-03-15
Attending: UROLOGY
Payer: COMMERCIAL

## 2019-03-15 VITALS
DIASTOLIC BLOOD PRESSURE: 54 MMHG | RESPIRATION RATE: 14 BRPM | OXYGEN SATURATION: 96 % | HEART RATE: 112 BPM | WEIGHT: 249 LBS | BODY MASS INDEX: 37.74 KG/M2 | SYSTOLIC BLOOD PRESSURE: 127 MMHG | HEIGHT: 68 IN | TEMPERATURE: 98.3 F

## 2019-03-15 DIAGNOSIS — N20.0 KIDNEY STONE: Primary | ICD-10-CM

## 2019-03-15 LAB
GLUCOSE BLDC GLUCOMTR-MCNC: 90 MG/DL (ref 70–99)
GLUCOSE BLDC GLUCOMTR-MCNC: 95 MG/DL (ref 70–99)

## 2019-03-15 PROCEDURE — 71000012 ZZH RECOVERY PHASE 1 LEVEL 1 FIRST HR: Performed by: UROLOGY

## 2019-03-15 PROCEDURE — 0TP98DZ REMOVAL OF INTRALUMINAL DEVICE FROM URETER, VIA NATURAL OR ARTIFICIAL OPENING ENDOSCOPIC: ICD-10-PCS | Performed by: UROLOGY

## 2019-03-15 PROCEDURE — 71000013 ZZH RECOVERY PHASE 1 LEVEL 1 EA ADDTL HR: Performed by: UROLOGY

## 2019-03-15 PROCEDURE — 0T778DZ DILATION OF LEFT URETER WITH INTRALUMINAL DEVICE, VIA NATURAL OR ARTIFICIAL OPENING ENDOSCOPIC: ICD-10-PCS | Performed by: UROLOGY

## 2019-03-15 PROCEDURE — 37000009 ZZH ANESTHESIA TECHNICAL FEE, EACH ADDTL 15 MIN: Performed by: UROLOGY

## 2019-03-15 PROCEDURE — 27210794 ZZH OR GENERAL SUPPLY STERILE: Performed by: UROLOGY

## 2019-03-15 PROCEDURE — 40000277 XR SURGERY CARM FLUORO LESS THAN 5 MIN W STILLS: Mod: TC

## 2019-03-15 PROCEDURE — 74420 UROGRAPHY RTRGR +-KUB: CPT | Mod: 26 | Performed by: UROLOGY

## 2019-03-15 PROCEDURE — 25000128 H RX IP 250 OP 636: Performed by: NURSE ANESTHETIST, CERTIFIED REGISTERED

## 2019-03-15 PROCEDURE — BT1F1ZZ FLUOROSCOPY OF LEFT KIDNEY, URETER AND BLADDER USING LOW OSMOLAR CONTRAST: ICD-10-PCS | Performed by: UROLOGY

## 2019-03-15 PROCEDURE — 40000306 ZZH STATISTIC PRE PROC ASSESS II: Performed by: UROLOGY

## 2019-03-15 PROCEDURE — 25000132 ZZH RX MED GY IP 250 OP 250 PS 637: Performed by: ANESTHESIOLOGY

## 2019-03-15 PROCEDURE — 25000125 ZZHC RX 250: Performed by: NURSE ANESTHETIST, CERTIFIED REGISTERED

## 2019-03-15 PROCEDURE — 82365 CALCULUS SPECTROSCOPY: CPT | Performed by: UROLOGY

## 2019-03-15 PROCEDURE — 52356 CYSTO/URETERO W/LITHOTRIPSY: CPT | Mod: 22 | Performed by: UROLOGY

## 2019-03-15 PROCEDURE — 93010 ELECTROCARDIOGRAM REPORT: CPT | Performed by: INTERNAL MEDICINE

## 2019-03-15 PROCEDURE — 25800030 ZZH RX IP 258 OP 636: Performed by: ANESTHESIOLOGY

## 2019-03-15 PROCEDURE — C2617 STENT, NON-COR, TEM W/O DEL: HCPCS | Performed by: UROLOGY

## 2019-03-15 PROCEDURE — 25000125 ZZHC RX 250: Performed by: UROLOGY

## 2019-03-15 PROCEDURE — C1758 CATHETER, URETERAL: HCPCS | Performed by: UROLOGY

## 2019-03-15 PROCEDURE — C1769 GUIDE WIRE: HCPCS | Performed by: UROLOGY

## 2019-03-15 PROCEDURE — 82962 GLUCOSE BLOOD TEST: CPT

## 2019-03-15 PROCEDURE — 88300 SURGICAL PATH GROSS: CPT | Mod: 26 | Performed by: UROLOGY

## 2019-03-15 PROCEDURE — 0TC78ZZ EXTIRPATION OF MATTER FROM LEFT URETER, VIA NATURAL OR ARTIFICIAL OPENING ENDOSCOPIC: ICD-10-PCS | Performed by: UROLOGY

## 2019-03-15 PROCEDURE — 88300 SURGICAL PATH GROSS: CPT | Performed by: UROLOGY

## 2019-03-15 PROCEDURE — 25000128 H RX IP 250 OP 636: Performed by: UROLOGY

## 2019-03-15 PROCEDURE — 36000058 ZZH SURGERY LEVEL 3 EA 15 ADDTL MIN: Performed by: UROLOGY

## 2019-03-15 PROCEDURE — 36000060 ZZH SURGERY LEVEL 3 W FLUORO 1ST 30 MIN: Performed by: UROLOGY

## 2019-03-15 PROCEDURE — 71000027 ZZH RECOVERY PHASE 2 EACH 15 MINS: Performed by: UROLOGY

## 2019-03-15 PROCEDURE — 37000008 ZZH ANESTHESIA TECHNICAL FEE, 1ST 30 MIN: Performed by: UROLOGY

## 2019-03-15 PROCEDURE — C1894 INTRO/SHEATH, NON-LASER: HCPCS | Performed by: UROLOGY

## 2019-03-15 DEVICE — STENT URETERAL POLARIS ULTRA 5FRX24CM M0061921220
Type: IMPLANTABLE DEVICE | Site: URETER | Status: NON-FUNCTIONAL
Removed: 2019-03-27

## 2019-03-15 RX ORDER — MEPERIDINE HYDROCHLORIDE 50 MG/ML
12.5 INJECTION INTRAMUSCULAR; INTRAVENOUS; SUBCUTANEOUS
Status: DISCONTINUED | OUTPATIENT
Start: 2019-03-15 | End: 2019-03-15 | Stop reason: HOSPADM

## 2019-03-15 RX ORDER — LIDOCAINE HYDROCHLORIDE 10 MG/ML
INJECTION, SOLUTION INFILTRATION; PERINEURAL PRN
Status: DISCONTINUED | OUTPATIENT
Start: 2019-03-15 | End: 2019-03-15

## 2019-03-15 RX ORDER — FENTANYL CITRATE 50 UG/ML
INJECTION, SOLUTION INTRAMUSCULAR; INTRAVENOUS PRN
Status: DISCONTINUED | OUTPATIENT
Start: 2019-03-15 | End: 2019-03-15

## 2019-03-15 RX ORDER — ONDANSETRON 2 MG/ML
4 INJECTION INTRAMUSCULAR; INTRAVENOUS EVERY 30 MIN PRN
Status: DISCONTINUED | OUTPATIENT
Start: 2019-03-15 | End: 2019-03-15 | Stop reason: HOSPADM

## 2019-03-15 RX ORDER — SODIUM CHLORIDE, SODIUM LACTATE, POTASSIUM CHLORIDE, CALCIUM CHLORIDE 600; 310; 30; 20 MG/100ML; MG/100ML; MG/100ML; MG/100ML
INJECTION, SOLUTION INTRAVENOUS CONTINUOUS
Status: DISCONTINUED | OUTPATIENT
Start: 2019-03-15 | End: 2019-03-15 | Stop reason: HOSPADM

## 2019-03-15 RX ORDER — OXYCODONE HYDROCHLORIDE 5 MG/1
15 TABLET ORAL ONCE
Status: COMPLETED | OUTPATIENT
Start: 2019-03-15 | End: 2019-03-15

## 2019-03-15 RX ORDER — FENTANYL CITRATE 50 UG/ML
25-50 INJECTION, SOLUTION INTRAMUSCULAR; INTRAVENOUS
Status: DISCONTINUED | OUTPATIENT
Start: 2019-03-15 | End: 2019-03-15 | Stop reason: HOSPADM

## 2019-03-15 RX ORDER — LABETALOL 20 MG/4 ML (5 MG/ML) INTRAVENOUS SYRINGE
10 EVERY 5 MIN PRN
Status: DISCONTINUED | OUTPATIENT
Start: 2019-03-15 | End: 2019-03-15 | Stop reason: HOSPADM

## 2019-03-15 RX ORDER — CEFAZOLIN SODIUM 1 G/3ML
1 INJECTION, POWDER, FOR SOLUTION INTRAMUSCULAR; INTRAVENOUS SEE ADMIN INSTRUCTIONS
Status: DISCONTINUED | OUTPATIENT
Start: 2019-03-15 | End: 2019-03-15 | Stop reason: HOSPADM

## 2019-03-15 RX ORDER — ONDANSETRON 4 MG/1
4 TABLET, ORALLY DISINTEGRATING ORAL EVERY 30 MIN PRN
Status: DISCONTINUED | OUTPATIENT
Start: 2019-03-15 | End: 2019-03-15 | Stop reason: HOSPADM

## 2019-03-15 RX ORDER — NALOXONE HYDROCHLORIDE 0.4 MG/ML
.1-.4 INJECTION, SOLUTION INTRAMUSCULAR; INTRAVENOUS; SUBCUTANEOUS
Status: DISCONTINUED | OUTPATIENT
Start: 2019-03-15 | End: 2019-03-15 | Stop reason: HOSPADM

## 2019-03-15 RX ORDER — ALBUTEROL SULFATE 0.83 MG/ML
2.5 SOLUTION RESPIRATORY (INHALATION) EVERY 4 HOURS PRN
Status: DISCONTINUED | OUTPATIENT
Start: 2019-03-15 | End: 2019-03-15 | Stop reason: HOSPADM

## 2019-03-15 RX ORDER — ONDANSETRON 2 MG/ML
INJECTION INTRAMUSCULAR; INTRAVENOUS PRN
Status: DISCONTINUED | OUTPATIENT
Start: 2019-03-15 | End: 2019-03-15

## 2019-03-15 RX ORDER — PROPOFOL 10 MG/ML
INJECTION, EMULSION INTRAVENOUS PRN
Status: DISCONTINUED | OUTPATIENT
Start: 2019-03-15 | End: 2019-03-15

## 2019-03-15 RX ORDER — HYDROMORPHONE HYDROCHLORIDE 1 MG/ML
.3-.5 INJECTION, SOLUTION INTRAMUSCULAR; INTRAVENOUS; SUBCUTANEOUS EVERY 10 MIN PRN
Status: DISCONTINUED | OUTPATIENT
Start: 2019-03-15 | End: 2019-03-15 | Stop reason: HOSPADM

## 2019-03-15 RX ORDER — FENTANYL CITRATE 50 UG/ML
25-50 INJECTION, SOLUTION INTRAMUSCULAR; INTRAVENOUS
Status: CANCELLED | OUTPATIENT
Start: 2019-03-15

## 2019-03-15 RX ORDER — LIDOCAINE 40 MG/G
CREAM TOPICAL
Status: DISCONTINUED | OUTPATIENT
Start: 2019-03-15 | End: 2019-03-15 | Stop reason: HOSPADM

## 2019-03-15 RX ORDER — CEFAZOLIN SODIUM 2 G/100ML
2 INJECTION, SOLUTION INTRAVENOUS
Status: COMPLETED | OUTPATIENT
Start: 2019-03-15 | End: 2019-03-15

## 2019-03-15 RX ADMIN — ONDANSETRON 4 MG: 2 INJECTION INTRAMUSCULAR; INTRAVENOUS at 15:26

## 2019-03-15 RX ADMIN — SODIUM CHLORIDE, POTASSIUM CHLORIDE, SODIUM LACTATE AND CALCIUM CHLORIDE: 600; 310; 30; 20 INJECTION, SOLUTION INTRAVENOUS at 13:42

## 2019-03-15 RX ADMIN — SODIUM CHLORIDE, POTASSIUM CHLORIDE, SODIUM LACTATE AND CALCIUM CHLORIDE: 600; 310; 30; 20 INJECTION, SOLUTION INTRAVENOUS at 15:25

## 2019-03-15 RX ADMIN — MIDAZOLAM 2 MG: 1 INJECTION INTRAMUSCULAR; INTRAVENOUS at 14:10

## 2019-03-15 RX ADMIN — FENTANYL CITRATE 25 MCG: 50 INJECTION, SOLUTION INTRAMUSCULAR; INTRAVENOUS at 14:54

## 2019-03-15 RX ADMIN — CEFAZOLIN SODIUM 2 G: 2 INJECTION, SOLUTION INTRAVENOUS at 14:10

## 2019-03-15 RX ADMIN — LIDOCAINE HYDROCHLORIDE 50 MG: 10 INJECTION, SOLUTION INFILTRATION; PERINEURAL at 14:15

## 2019-03-15 RX ADMIN — FENTANYL CITRATE 100 MCG: 50 INJECTION, SOLUTION INTRAMUSCULAR; INTRAVENOUS at 14:15

## 2019-03-15 RX ADMIN — OXYCODONE HYDROCHLORIDE 15 MG: 5 TABLET ORAL at 16:30

## 2019-03-15 RX ADMIN — PROPOFOL 50 MG: 10 INJECTION, EMULSION INTRAVENOUS at 14:25

## 2019-03-15 RX ADMIN — PROPOFOL 200 MG: 10 INJECTION, EMULSION INTRAVENOUS at 14:15

## 2019-03-15 ASSESSMENT — MIFFLIN-ST. JEOR
SCORE: 1772.83
SCORE: 1772.96

## 2019-03-15 ASSESSMENT — LIFESTYLE VARIABLES: TOBACCO_USE: 1

## 2019-03-15 NOTE — OR NURSING
Report given to Kathy at [facility].  Pt alert and oriented, up in wheelchair, pain in R hip is coming down, waves of nausea but pt does not want medicine, only sips of water.    [facility] here to transport pt back to [facility]. Instructions reviewed with pt's spouse and RN at [facility].

## 2019-03-15 NOTE — OR NURSING
Dr. Rivas notified of patient's chronic pain. Orders received to administer 15 mg Oxycodone P.O. Per home regime.

## 2019-03-15 NOTE — ANESTHESIA CARE TRANSFER NOTE
Patient: Amanda Richardson    Procedure(s):  Cystoscopy, left ureteroscopy with holmium laser lithotripsy, left stent placement    Diagnosis: Stone  Diagnosis Additional Information: No value filed.    Anesthesia Type:   General, ETT     Note:  Airway :Face Mask  Patient transferred to:PACU  Comments: Leslie Report: Identifed the Patient, Identified the Reponsible Provider, Reviewed the pertinent medical history, Discussed the surgical course, Reviewed Intra-OP anesthesia mangement and issues during anesthesia, Set expectations for post-procedure period and Allowed opportunity for questions and acknowledgement of understanding      Vitals: (Last set prior to Anesthesia Care Transfer)    CRNA VITALS  3/15/2019 1502 - 3/15/2019 1540      3/15/2019             Pulse:  87    SpO2:  100 %    Resp Rate (observed):  16                Electronically Signed By: BAYLEE Rogers CRNA  March 15, 2019  3:40 PM

## 2019-03-15 NOTE — ANESTHESIA PREPROCEDURE EVALUATION
Anesthesia Pre-Procedure Evaluation    Patient: Amanda Richardson   MRN: [id number] : 1965          Preoperative Diagnosis: Stone    Procedure(s):  Cystoscopy, left ureteroscopy with holmium laser lithotripsy, left stent placement    Past Medical History:   Diagnosis Date     Abnormality of gait 2012     Arrhythmia     with sepsis     Chronic pain     [facility] - yearly, next in summer 2018     CKD (chronic kidney disease) stage 1, GFR 90 ml/min or greater     kidney stones     Colon polyps 1/15    tubular adenomas x 2     Gallstone 2012     Hyperlipidemia LDL goal <70      Hypertension goal BP (blood pressure) < 140/90      Leukocytosis 2012     Moderate depressive episode (H)      MS (multiple sclerosis) (H)     Dr Vigil/Gaby - [facility]     Multiple sclerosis (H)      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 Cystoscopy 2. LEFT retrograde pyelogram 3. LEFT JJ stent placement 4. <1hr physician fluoroscopy time;  Surgeon: Epifanio Sapp MD;  Location:   OR     FUSION LUMBAR ANTERIOR, FUSION LUMBAR POSTERIOR TWO LEVELS, COMBINED  10/17/2013    lumbar fusion - Dr Floyd     INSERT PUMP BACLOFEN  04/2017    intrathecal baclofen pump implantation     IRRIGATION AND DEBRIDEMENT LOWER EXTREMITY, COMBINED Right 2015    Right ankle I&D d/t infection     OPEN REDUCTION INTERNAL FIXATION ANKLE  5/15    Right Bimalleolar ankle fx ORIF     OPEN REDUCTION INTERNAL FIXATION ANKLE Right 11/2015    Revision due to spasms pulling screws out of ankle     SINUS SURGERY  2011    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     XR LUMBAR EPIDURAL INJECTION INCL IMAGING  3/14    Left L4-5 Epidural Dr Winter     Anesthesia Evaluation     . Pt has had prior anesthetic. Type: General    No history of anesthetic complications          ROS/MED HX    ENT/Pulmonary:     (+)tobacco use, , . .    Neurologic:     (+)Multiple Sclerosis     Cardiovascular:     (+) hypertension----. : . . . :. .       METS/Exercise Tolerance:     Hematologic:  - neg hematologic  ROS       Musculoskeletal:  - neg musculoskeletal ROS       GI/Hepatic:  - neg GI/hepatic ROS       Renal/Genitourinary:     (+) Nephrolithiasis ,       Endo:     (+) type II DM Obesity, .      Psychiatric:  - neg psychiatric ROS       Infectious Disease:  - neg infectious disease ROS       Malignancy:   (+) History of Other  Other CA renal status post      - no malignancy   Other:    (+) No chance of pregnancy C-spine cleared: N/A, no H/O Chronic Pain,no other significant disability   - neg other ROS                      Physical Exam  Normal systems: cardiovascular, pulmonary and dental    Airway   Mallampati: I  TM distance: >3 FB  Neck ROM: full    Dental     Cardiovascular       Pulmonary             Lab Results   Component Value Date    WBC 13.2 (H) 03/11/2019    HGB 10.1 (L) 03/11/2019    HCT 33.9 (L) 03/11/2019     03/11/2019    CRP 57.7 (H) 02/04/2019    SED 39 (H) 11/01/2017     03/11/2019    POTASSIUM 3.6  "03/11/2019    CHLORIDE 99 03/11/2019    CO2 27 03/11/2019    BUN 20 03/11/2019    CR 0.68 03/11/2019    GLC 82 03/11/2019    MARY 8.8 03/11/2019    PHOS 5.0 (H) 02/11/2019    MAG 2.4 (H) 02/14/2019    ALBUMIN 2.1 (L) 02/12/2019    PROTTOTAL 7.0 02/12/2019    ALT 35 02/12/2019    AST 22 02/12/2019    ALKPHOS 174 (H) 02/12/2019    BILITOTAL 0.4 02/12/2019    PTT 26 10/03/2013    INR 0.93 10/03/2013    TSH 5.08 (H) 01/30/2019    T4 1.19 12/08/2014       Preop Vitals  BP Readings from Last 3 Encounters:   03/13/19 142/60   03/08/19 97/57   03/05/19 111/63    Pulse Readings from Last 3 Encounters:   03/13/19 83   03/08/19 86   03/05/19 83      Resp Readings from Last 3 Encounters:   03/13/19 18   03/08/19 18   03/05/19 18    SpO2 Readings from Last 3 Encounters:   03/13/19 96%   03/08/19 96%   03/05/19 95%      Temp Readings from Last 1 Encounters:   03/13/19 97  F (36.1  C)    Ht Readings from Last 1 Encounters:   03/08/19 1.727 m (5' 8\")      Wt Readings from Last 1 Encounters:   03/08/19 111.6 kg (246 lb)    Estimated body mass index is 37.4 kg/m  as calculated from the following:    Height as of this encounter: 1.727 m (5' 8\").    Weight as of this encounter: 111.6 kg (246 lb).       Anesthesia Plan      History & Physical Review  History and physical reviewed and following examination; no interval change.    ASA Status:  3 .    NPO Status:  > 8 hours    Plan for General and ETT with Intravenous and Propofol induction. Maintenance will be Balanced.    PONV prophylaxis:  Ondansetron (or other 5HT-3) and Dexamethasone or Solumedrol       Postoperative Care  Postoperative pain management:  IV analgesics and Oral pain medications.      Consents  Anesthetic plan, risks, benefits and alternatives discussed with:  Patient or representative and Patient..                 Fransisco Rivas MD                    .  "

## 2019-03-15 NOTE — ANESTHESIA POSTPROCEDURE EVALUATION
Patient: Amanda Richardson    Procedure(s):  Cystoscopy, left ureteroscopy with holmium laser lithotripsy, left stent placement    Diagnosis:Stone  Diagnosis Additional Information: Ureteral Stone    Anesthesia Type:  General, ETT    Note:  Anesthesia Post Evaluation    Patient location during evaluation: PACU  Patient participation: Able to fully participate in evaluation  Level of consciousness: awake and alert  Pain management: adequate  Airway patency: patent  Cardiovascular status: acceptable  Respiratory status: acceptable  Hydration status: acceptable  PONV: none     Anesthetic complications: None          Last vitals:  Vitals:    03/15/19 1645 03/15/19 1700 03/15/19 1725   BP: 146/72  127/54   Pulse:  112    Resp: 12  14   Temp: 98.3  F (36.8  C)     SpO2: 96% 93% 96%         Electronically Signed By: Fransisco Rivas MD  March 15, 2019  5:47 PM

## 2019-03-15 NOTE — DISCHARGE INSTRUCTIONS
CYSTOSCOPY DISCHARGE INSTRUCTIONS  [facility] / UROLOGY  [facility]  [phone]    YOU MAY GO BACK TO YOUR NORMAL DIET AND ACTIVITY, UNLESS YOUR DOCTOR TELLS YOU NOT TO.    FOR THE NEXT TWO DAYS, YOU MAY NOTICE:    SOME BLOOD IN YOUR URINE.  SOME BURNING WHEN YOU URINATE (USE THE TOILET).  AN URGE TO URINATE MORE OFTEN.  BLADDER SPASMS.    THESE ARE NORMAL AFTER THE PROCEDURE.  THEY SHOULD GO AWAY AFTER A DAY OR TWO.  TO RELIEVE THESE PROBLEMS:     DRINK 6 TO 8 LARGE GLASSES OF WATER EACH DAY (INCLUDES DRINKS AT MEALS).  THIS WILL HELP CLEAR THE URINE.    TAKE WARM BATHS TO RELIEVE PAIN AND BLADDER SPASMS.  DO NOT ADD ANYTHING TO THE BATH WATER.    YOUR DOCTOR MAY PRESCRIBE PAIN MEDICINE.  YOU MAY ALSO TAKE TYLENOL (ACETAMINOPHEN) FOR PAIN.    CALL YOUR SURGEON IF YOU HAVE:    A FEVER OVER 101 DEGREES.  CHECK YOUR TEMPERATURE UNDER YOUR TONGUE.    CHILLS.    FAILURE TO URINATE (NO URINE COMES OUT WHEN YOU TRY TO USE THE TOILET).  TRY SOAKING IN A BATHTUB FULL OF WARM WATER.  IF STILL NO URINE, CALL YOUR DOCTOR.    A LOT OF BLOOD IN THE URINE, OR BLOOD CLOTS LARGER THAN A NICKEL.      PAIN IN THE BACK OR BELLY AREA (ABDOMEN).    PAIN OR SPASMS THAT ARE NOT RELIEVED BY WARM TUB BATHS AND PAIN MEDICINE.      SEVERE PAIN, BURNING OR OTHER PROBLEMS WHILE PASSING URINE.    PAIN THAT GETS WORSE AFTER TWO DAYS.        GENERAL ANESTHESIA OR SEDATION ADULT DISCHARGE INSTRUCTIONS   SPECIAL PRECAUTIONS FOR 24 HOURS AFTER SURGERY    IT IS NOT UNUSUAL TO FEEL LIGHT-HEADED OR FAINT, UP TO 24 HOURS AFTER SURGERY OR WHILE TAKING PAIN MEDICATION.  IF YOU HAVE THESE SYMPTOMS; SIT FOR A FEW MINUTES BEFORE STANDING AND HAVE SOMEONE ASSIST YOU WHEN YOU GET UP TO WALK OR USE THE BATHROOM.    YOU SHOULD REST AND RELAX FOR THE NEXT 24 HOURS AND YOU MUST MAKE ARRANGEMENTS TO HAVE SOMEONE STAY WITH YOU FOR AT LEAST 24 HOURS AFTER YOUR DISCHARGE.  AVOID HAZARDOUS AND STRENUOUS ACTIVITIES.  DO NOT MAKE IMPORTANT  DECISIONS FOR 24 HOURS.    DO NOT DRIVE ANY VEHICLE OR OPERATE MECHANICAL EQUIPMENT FOR 24 HOURS FOLLOWING THE END OF YOUR SURGERY.  EVEN THOUGH YOU MAY FEEL NORMAL, YOUR REACTIONS MAY BE AFFECTED BY THE MEDICATION YOU HAVE RECEIVED.    DO NOT DRINK ALCOHOLIC BEVERAGES FOR 24 HOURS FOLLOWING YOUR SURGERY.    DRINK CLEAR LIQUIDS (APPLE JUICE, GINGER ALE, 7-UP, BROTH, ETC.).  PROGRESS TO YOUR REGULAR DIET AS YOU FEEL ABLE.    YOU MAY HAVE A DRY MOUTH, A SORE THROAT, MUSCLES ACHES OR TROUBLE SLEEPING.  THESE SHOULD GO AWAY AFTER 24 HOURS.    CALL YOUR DOCTOR FOR ANY OF THE FOLLOWING:  SIGNS OF INFECTION (FEVER, GROWING TENDERNESS AT THE SURGERY SITE, A LARGE AMOUNT OF DRAINAGE OR BLEEDING, SEVERE PAIN, FOUL-SMELLING DRAINAGE, REDNESS OR SWELLING.    IT HAS BEEN OVER 8 TO 10 HOURS SINCE SURGERY AND YOU ARE STILL NOT ABLE TO URINATE (PASS WATER).

## 2019-03-16 ENCOUNTER — APPOINTMENT (OUTPATIENT)
Dept: GENERAL RADIOLOGY | Facility: CLINIC | Age: 54
DRG: 856 | End: 2019-03-16
Attending: EMERGENCY MEDICINE
Payer: COMMERCIAL

## 2019-03-16 ENCOUNTER — TELEPHONE (OUTPATIENT)
Dept: GERIATRICS | Facility: CLINIC | Age: 54
End: 2019-03-16

## 2019-03-16 ENCOUNTER — APPOINTMENT (OUTPATIENT)
Dept: CT IMAGING | Facility: CLINIC | Age: 54
DRG: 856 | End: 2019-03-16
Attending: EMERGENCY MEDICINE
Payer: COMMERCIAL

## 2019-03-16 ENCOUNTER — HOSPITAL ENCOUNTER (INPATIENT)
Facility: CLINIC | Age: 54
LOS: 10 days | Discharge: NURSING, REHAB OR LONG-TERM CARE | DRG: 856 | End: 2019-03-26
Attending: EMERGENCY MEDICINE | Admitting: INTERNAL MEDICINE
Payer: COMMERCIAL

## 2019-03-16 DIAGNOSIS — M54.16 LUMBAR RADICULOPATHY: ICD-10-CM

## 2019-03-16 DIAGNOSIS — M48.061 SPINAL STENOSIS OF LUMBAR REGION, UNSPECIFIED WHETHER NEUROGENIC CLAUDICATION PRESENT: ICD-10-CM

## 2019-03-16 DIAGNOSIS — G89.4 CHRONIC PAIN SYNDROME: Chronic | ICD-10-CM

## 2019-03-16 DIAGNOSIS — R65.20 SEVERE SEPSIS (H): ICD-10-CM

## 2019-03-16 DIAGNOSIS — N10 ACUTE PYELONEPHRITIS: ICD-10-CM

## 2019-03-16 DIAGNOSIS — G35 MS (MULTIPLE SCLEROSIS) (H): ICD-10-CM

## 2019-03-16 DIAGNOSIS — A41.9 SEVERE SEPSIS (H): ICD-10-CM

## 2019-03-16 DIAGNOSIS — A41.9 SEPSIS, DUE TO UNSPECIFIED ORGANISM: Primary | ICD-10-CM

## 2019-03-16 LAB
ALBUMIN SERPL-MCNC: 2.3 G/DL (ref 3.4–5)
ALBUMIN UR-MCNC: 100 MG/DL
ALP SERPL-CCNC: 171 U/L (ref 40–150)
ALT SERPL W P-5'-P-CCNC: 28 U/L (ref 0–50)
AMORPH CRY #/AREA URNS HPF: ABNORMAL /HPF
ANION GAP SERPL CALCULATED.3IONS-SCNC: 6 MMOL/L (ref 3–14)
APPEARANCE UR: ABNORMAL
AST SERPL W P-5'-P-CCNC: 31 U/L (ref 0–45)
BACTERIA #/AREA URNS HPF: ABNORMAL /HPF
BASE EXCESS BLDA CALC-SCNC: 4.9 MMOL/L
BASE EXCESS BLDV CALC-SCNC: 3.2 MMOL/L
BASOPHILS # BLD AUTO: 0.1 10E9/L (ref 0–0.2)
BASOPHILS NFR BLD AUTO: 0.3 %
BILIRUB SERPL-MCNC: 0.7 MG/DL (ref 0.2–1.3)
BILIRUB UR QL STRIP: NEGATIVE
BUN SERPL-MCNC: 31 MG/DL (ref 7–30)
CALCIUM SERPL-MCNC: 7.7 MG/DL (ref 8.5–10.1)
CHLORIDE SERPL-SCNC: 98 MMOL/L (ref 94–109)
CO2 BLDCOV-SCNC: 29 MMOL/L (ref 21–28)
CO2 BLDCOV-SCNC: 31 MMOL/L (ref 21–28)
CO2 SERPL-SCNC: 29 MMOL/L (ref 20–32)
COLOR UR AUTO: YELLOW
CREAT SERPL-MCNC: 1.64 MG/DL (ref 0.52–1.04)
DIFFERENTIAL METHOD BLD: ABNORMAL
EOSINOPHIL # BLD AUTO: 0 10E9/L (ref 0–0.7)
EOSINOPHIL NFR BLD AUTO: 0 %
ERYTHROCYTE [DISTWIDTH] IN BLOOD BY AUTOMATED COUNT: 17.5 % (ref 10–15)
FLUAV+FLUBV AG SPEC QL: NEGATIVE
FLUAV+FLUBV AG SPEC QL: NEGATIVE
GFR SERPL CREATININE-BSD FRML MDRD: 35 ML/MIN/{1.73_M2}
GLUCOSE BLDC GLUCOMTR-MCNC: 70 MG/DL (ref 70–99)
GLUCOSE BLDC GLUCOMTR-MCNC: 88 MG/DL (ref 70–99)
GLUCOSE BLDC GLUCOMTR-MCNC: 88 MG/DL (ref 70–99)
GLUCOSE SERPL-MCNC: 123 MG/DL (ref 70–99)
GLUCOSE UR STRIP-MCNC: NEGATIVE MG/DL
HCO3 BLD-SCNC: 32 MMOL/L (ref 21–28)
HCO3 BLDV-SCNC: 32 MMOL/L (ref 21–28)
HCT VFR BLD AUTO: 32.4 % (ref 35–47)
HGB BLD-MCNC: 9.8 G/DL (ref 11.7–15.7)
HGB UR QL STRIP: ABNORMAL
IMM GRANULOCYTES # BLD: 0.6 10E9/L (ref 0–0.4)
IMM GRANULOCYTES NFR BLD: 1.6 %
KETONES UR STRIP-MCNC: NEGATIVE MG/DL
LACTATE BLD-SCNC: 1.2 MMOL/L (ref 0.7–2)
LACTATE BLD-SCNC: 1.4 MMOL/L (ref 0.7–2.1)
LACTATE BLD-SCNC: 1.6 MMOL/L (ref 0.7–2)
LACTATE BLD-SCNC: 1.8 MMOL/L (ref 0.7–2.1)
LEUKOCYTE ESTERASE UR QL STRIP: ABNORMAL
LYMPHOCYTES # BLD AUTO: 0.9 10E9/L (ref 0.8–5.3)
LYMPHOCYTES NFR BLD AUTO: 2.2 %
MCH RBC QN AUTO: 26.5 PG (ref 26.5–33)
MCHC RBC AUTO-ENTMCNC: 30.2 G/DL (ref 31.5–36.5)
MCV RBC AUTO: 88 FL (ref 78–100)
MONOCYTES # BLD AUTO: 1.8 10E9/L (ref 0–1.3)
MONOCYTES NFR BLD AUTO: 4.5 %
MUCOUS THREADS #/AREA URNS LPF: PRESENT /LPF
NEUTROPHILS # BLD AUTO: 35.1 10E9/L (ref 1.6–8.3)
NEUTROPHILS NFR BLD AUTO: 91.4 %
NITRATE UR QL: POSITIVE
NRBC # BLD AUTO: 0 10*3/UL
NRBC BLD AUTO-RTO: 0 /100
O2/TOTAL GAS SETTING VFR VENT: 40 %
O2/TOTAL GAS SETTING VFR VENT: ABNORMAL %
OXYHGB MFR BLD: 94 % (ref 92–100)
OXYHGB MFR BLDV: 86 %
PCO2 BLD: 61 MM HG (ref 35–45)
PCO2 BLDV: 68 MM HG (ref 40–50)
PCO2 BLDV: 69 MM HG (ref 40–50)
PCO2 BLDV: 70 MM HG (ref 40–50)
PH BLD: 7.33 PH (ref 7.35–7.45)
PH BLDV: 7.23 PH (ref 7.32–7.43)
PH BLDV: 7.26 PH (ref 7.32–7.43)
PH BLDV: 7.27 PH (ref 7.32–7.43)
PH UR STRIP: 5 PH (ref 5–7)
PLATELET # BLD AUTO: 321 10E9/L (ref 150–450)
PO2 BLD: 80 MM HG (ref 80–105)
PO2 BLDV: 23 MM HG (ref 25–47)
PO2 BLDV: 61 MM HG (ref 25–47)
PO2 BLDV: 63 MM HG (ref 25–47)
POTASSIUM SERPL-SCNC: 4.8 MMOL/L (ref 3.4–5.3)
PROT SERPL-MCNC: 7.3 G/DL (ref 6.8–8.8)
RBC # BLD AUTO: 3.7 10E12/L (ref 3.8–5.2)
RBC #/AREA URNS AUTO: 146 /HPF (ref 0–2)
SAO2 % BLDV FROM PO2: 30 %
SAO2 % BLDV FROM PO2: 87 %
SODIUM SERPL-SCNC: 133 MMOL/L (ref 133–144)
SOURCE: ABNORMAL
SP GR UR STRIP: 1.01 (ref 1–1.03)
SPECIMEN SOURCE: NORMAL
SQUAMOUS #/AREA URNS AUTO: 1 /HPF (ref 0–1)
TROPONIN I SERPL-MCNC: 0.05 UG/L (ref 0–0.04)
TROPONIN I SERPL-MCNC: 0.06 UG/L (ref 0–0.04)
UROBILINOGEN UR STRIP-MCNC: 0 MG/DL (ref 0–2)
WBC # BLD AUTO: 38.5 10E9/L (ref 4–11)
WBC #/AREA URNS AUTO: 45 /HPF (ref 0–5)
WBC CLUMPS #/AREA URNS HPF: PRESENT /HPF

## 2019-03-16 PROCEDURE — 20000003 ZZH R&B ICU

## 2019-03-16 PROCEDURE — 94003 VENT MGMT INPAT SUBQ DAY: CPT

## 2019-03-16 PROCEDURE — 82803 BLOOD GASES ANY COMBINATION: CPT

## 2019-03-16 PROCEDURE — 87081 CULTURE SCREEN ONLY: CPT | Performed by: INTERNAL MEDICINE

## 2019-03-16 PROCEDURE — 99291 CRITICAL CARE FIRST HOUR: CPT | Performed by: SURGERY

## 2019-03-16 PROCEDURE — 82805 BLOOD GASES W/O2 SATURATION: CPT | Performed by: EMERGENCY MEDICINE

## 2019-03-16 PROCEDURE — 99292 CRITICAL CARE ADDL 30 MIN: CPT

## 2019-03-16 PROCEDURE — 70450 CT HEAD/BRAIN W/O DYE: CPT

## 2019-03-16 PROCEDURE — 25000125 ZZHC RX 250: Performed by: SURGERY

## 2019-03-16 PROCEDURE — 25000128 H RX IP 250 OP 636: Performed by: INTERNAL MEDICINE

## 2019-03-16 PROCEDURE — 25000128 H RX IP 250 OP 636

## 2019-03-16 PROCEDURE — 80053 COMPREHEN METABOLIC PANEL: CPT | Performed by: EMERGENCY MEDICINE

## 2019-03-16 PROCEDURE — 87186 SC STD MICRODIL/AGAR DIL: CPT | Performed by: INTERNAL MEDICINE

## 2019-03-16 PROCEDURE — 87804 INFLUENZA ASSAY W/OPTIC: CPT | Performed by: EMERGENCY MEDICINE

## 2019-03-16 PROCEDURE — 99291 CRITICAL CARE FIRST HOUR: CPT | Performed by: INTERNAL MEDICINE

## 2019-03-16 PROCEDURE — 25000128 H RX IP 250 OP 636: Performed by: SURGERY

## 2019-03-16 PROCEDURE — 25000132 ZZH RX MED GY IP 250 OP 250 PS 637: Performed by: INTERNAL MEDICINE

## 2019-03-16 PROCEDURE — C9113 INJ PANTOPRAZOLE SODIUM, VIA: HCPCS | Performed by: INTERNAL MEDICINE

## 2019-03-16 PROCEDURE — 40000275 ZZH STATISTIC RCP TIME EA 10 MIN

## 2019-03-16 PROCEDURE — 93005 ELECTROCARDIOGRAM TRACING: CPT

## 2019-03-16 PROCEDURE — 96361 HYDRATE IV INFUSION ADD-ON: CPT

## 2019-03-16 PROCEDURE — 99291 CRITICAL CARE FIRST HOUR: CPT | Mod: 25

## 2019-03-16 PROCEDURE — 94002 VENT MGMT INPAT INIT DAY: CPT

## 2019-03-16 PROCEDURE — 00000146 ZZHCL STATISTIC GLUCOSE BY METER IP

## 2019-03-16 PROCEDURE — 96368 THER/DIAG CONCURRENT INF: CPT

## 2019-03-16 PROCEDURE — 84484 ASSAY OF TROPONIN QUANT: CPT | Performed by: EMERGENCY MEDICINE

## 2019-03-16 PROCEDURE — 25000125 ZZHC RX 250: Performed by: EMERGENCY MEDICINE

## 2019-03-16 PROCEDURE — 25800030 ZZH RX IP 258 OP 636: Performed by: SURGERY

## 2019-03-16 PROCEDURE — 87086 URINE CULTURE/COLONY COUNT: CPT | Performed by: EMERGENCY MEDICINE

## 2019-03-16 PROCEDURE — 87077 CULTURE AEROBIC IDENTIFY: CPT | Performed by: INTERNAL MEDICINE

## 2019-03-16 PROCEDURE — 3E043XZ INTRODUCTION OF VASOPRESSOR INTO CENTRAL VEIN, PERCUTANEOUS APPROACH: ICD-10-PCS | Performed by: SURGERY

## 2019-03-16 PROCEDURE — 25000132 ZZH RX MED GY IP 250 OP 250 PS 637: Performed by: EMERGENCY MEDICINE

## 2019-03-16 PROCEDURE — 25800030 ZZH RX IP 258 OP 636: Performed by: EMERGENCY MEDICINE

## 2019-03-16 PROCEDURE — 87186 SC STD MICRODIL/AGAR DIL: CPT | Performed by: EMERGENCY MEDICINE

## 2019-03-16 PROCEDURE — 40000276 ZZH STATISTIC RCP TIME ED VENT EA 10 MIN

## 2019-03-16 PROCEDURE — 27210197 ZZH KIT POWER PICC TRIPLE LUMEN

## 2019-03-16 PROCEDURE — 96366 THER/PROPH/DIAG IV INF ADDON: CPT

## 2019-03-16 PROCEDURE — 84484 ASSAY OF TROPONIN QUANT: CPT | Performed by: INTERNAL MEDICINE

## 2019-03-16 PROCEDURE — 31500 INSERT EMERGENCY AIRWAY: CPT

## 2019-03-16 PROCEDURE — 71045 X-RAY EXAM CHEST 1 VIEW: CPT

## 2019-03-16 PROCEDURE — 5A1945Z RESPIRATORY VENTILATION, 24-96 CONSECUTIVE HOURS: ICD-10-PCS | Performed by: EMERGENCY MEDICINE

## 2019-03-16 PROCEDURE — 36600 WITHDRAWAL OF ARTERIAL BLOOD: CPT

## 2019-03-16 PROCEDURE — 87088 URINE BACTERIA CULTURE: CPT | Performed by: EMERGENCY MEDICINE

## 2019-03-16 PROCEDURE — 81001 URINALYSIS AUTO W/SCOPE: CPT | Performed by: EMERGENCY MEDICINE

## 2019-03-16 PROCEDURE — 25000128 H RX IP 250 OP 636: Performed by: EMERGENCY MEDICINE

## 2019-03-16 PROCEDURE — 25800030 ZZH RX IP 258 OP 636: Performed by: INTERNAL MEDICINE

## 2019-03-16 PROCEDURE — 83605 ASSAY OF LACTIC ACID: CPT | Performed by: EMERGENCY MEDICINE

## 2019-03-16 PROCEDURE — 83605 ASSAY OF LACTIC ACID: CPT

## 2019-03-16 PROCEDURE — 74176 CT ABD & PELVIS W/O CONTRAST: CPT

## 2019-03-16 PROCEDURE — 85025 COMPLETE CBC W/AUTO DIFF WBC: CPT | Performed by: EMERGENCY MEDICINE

## 2019-03-16 PROCEDURE — 36569 INSJ PICC 5 YR+ W/O IMAGING: CPT

## 2019-03-16 PROCEDURE — 96365 THER/PROPH/DIAG IV INF INIT: CPT

## 2019-03-16 PROCEDURE — 87040 BLOOD CULTURE FOR BACTERIA: CPT | Performed by: EMERGENCY MEDICINE

## 2019-03-16 PROCEDURE — 82805 BLOOD GASES W/O2 SATURATION: CPT | Performed by: SURGERY

## 2019-03-16 RX ORDER — NALOXONE HYDROCHLORIDE 0.4 MG/ML
.1-.4 INJECTION, SOLUTION INTRAMUSCULAR; INTRAVENOUS; SUBCUTANEOUS
Status: DISCONTINUED | OUTPATIENT
Start: 2019-03-16 | End: 2019-03-16

## 2019-03-16 RX ORDER — POTASSIUM CL/LIDO/0.9 % NACL 10MEQ/0.1L
10 INTRAVENOUS SOLUTION, PIGGYBACK (ML) INTRAVENOUS
Status: DISCONTINUED | OUTPATIENT
Start: 2019-03-16 | End: 2019-03-26 | Stop reason: HOSPADM

## 2019-03-16 RX ORDER — NALOXONE HYDROCHLORIDE 0.4 MG/ML
.1-.4 INJECTION, SOLUTION INTRAMUSCULAR; INTRAVENOUS; SUBCUTANEOUS
Status: DISCONTINUED | OUTPATIENT
Start: 2019-03-16 | End: 2019-03-26 | Stop reason: HOSPADM

## 2019-03-16 RX ORDER — SODIUM CHLORIDE, SODIUM LACTATE, POTASSIUM CHLORIDE, CALCIUM CHLORIDE 600; 310; 30; 20 MG/100ML; MG/100ML; MG/100ML; MG/100ML
INJECTION, SOLUTION INTRAVENOUS CONTINUOUS
Status: DISCONTINUED | OUTPATIENT
Start: 2019-03-16 | End: 2019-03-18

## 2019-03-16 RX ORDER — CEFAZOLIN SODIUM 1 G/50ML
2000 SOLUTION INTRAVENOUS ONCE
Status: COMPLETED | OUTPATIENT
Start: 2019-03-16 | End: 2019-03-16

## 2019-03-16 RX ORDER — SODIUM CHLORIDE 9 MG/ML
INJECTION, SOLUTION INTRAVENOUS CONTINUOUS
Status: DISCONTINUED | OUTPATIENT
Start: 2019-03-16 | End: 2019-03-26 | Stop reason: HOSPADM

## 2019-03-16 RX ORDER — HEPARIN SODIUM 5000 [USP'U]/.5ML
5000 INJECTION, SOLUTION INTRAVENOUS; SUBCUTANEOUS EVERY 12 HOURS
Status: DISCONTINUED | OUTPATIENT
Start: 2019-03-16 | End: 2019-03-16

## 2019-03-16 RX ORDER — POTASSIUM CHLORIDE 1.5 G/1.58G
20-40 POWDER, FOR SOLUTION ORAL
Status: DISCONTINUED | OUTPATIENT
Start: 2019-03-16 | End: 2019-03-26 | Stop reason: HOSPADM

## 2019-03-16 RX ORDER — BACLOFEN 10 MG/1
10 TABLET ORAL DAILY PRN
COMMUNITY
End: 2019-06-10 | Stop reason: DRUGHIGH

## 2019-03-16 RX ORDER — PROPOFOL 10 MG/ML
5-75 INJECTION, EMULSION INTRAVENOUS CONTINUOUS
Status: DISCONTINUED | OUTPATIENT
Start: 2019-03-16 | End: 2019-03-16

## 2019-03-16 RX ORDER — PROPOFOL 10 MG/ML
10-20 INJECTION, EMULSION INTRAVENOUS EVERY 30 MIN PRN
Status: DISCONTINUED | OUTPATIENT
Start: 2019-03-16 | End: 2019-03-16

## 2019-03-16 RX ORDER — PROPOFOL 10 MG/ML
5-75 INJECTION, EMULSION INTRAVENOUS CONTINUOUS
Status: DISCONTINUED | OUTPATIENT
Start: 2019-03-16 | End: 2019-03-18

## 2019-03-16 RX ORDER — POLYETHYLENE GLYCOL 3350 17 G/17G
17 POWDER, FOR SOLUTION ORAL DAILY
COMMUNITY
End: 2019-06-10

## 2019-03-16 RX ORDER — POTASSIUM CHLORIDE 7.45 MG/ML
10 INJECTION INTRAVENOUS
Status: DISCONTINUED | OUTPATIENT
Start: 2019-03-16 | End: 2019-03-26 | Stop reason: HOSPADM

## 2019-03-16 RX ORDER — DEXTROSE MONOHYDRATE 25 G/50ML
25-50 INJECTION, SOLUTION INTRAVENOUS
Status: DISCONTINUED | OUTPATIENT
Start: 2019-03-16 | End: 2019-03-19

## 2019-03-16 RX ORDER — NOREPINEPHRINE BITARTRATE/D5W 16MG/250ML
.03-.4 PLASTIC BAG, INJECTION (ML) INTRAVENOUS CONTINUOUS
Status: DISCONTINUED | OUTPATIENT
Start: 2019-03-16 | End: 2019-03-18

## 2019-03-16 RX ORDER — PROPOFOL 10 MG/ML
INJECTION, EMULSION INTRAVENOUS
Status: COMPLETED
Start: 2019-03-16 | End: 2019-03-16

## 2019-03-16 RX ORDER — LIDOCAINE 40 MG/G
CREAM TOPICAL
Status: DISCONTINUED | OUTPATIENT
Start: 2019-03-16 | End: 2019-03-16 | Stop reason: CLARIF

## 2019-03-16 RX ORDER — HYDROMORPHONE HYDROCHLORIDE 1 MG/ML
.5-1 INJECTION, SOLUTION INTRAMUSCULAR; INTRAVENOUS; SUBCUTANEOUS
Status: DISCONTINUED | OUTPATIENT
Start: 2019-03-16 | End: 2019-03-18

## 2019-03-16 RX ORDER — MAGNESIUM SULFATE HEPTAHYDRATE 40 MG/ML
4 INJECTION, SOLUTION INTRAVENOUS EVERY 4 HOURS PRN
Status: DISCONTINUED | OUTPATIENT
Start: 2019-03-16 | End: 2019-03-26 | Stop reason: HOSPADM

## 2019-03-16 RX ORDER — ONDANSETRON 4 MG/1
4 TABLET, ORALLY DISINTEGRATING ORAL EVERY 6 HOURS PRN
Status: DISCONTINUED | OUTPATIENT
Start: 2019-03-16 | End: 2019-03-26 | Stop reason: HOSPADM

## 2019-03-16 RX ORDER — HYDROMORPHONE HYDROCHLORIDE 1 MG/ML
.3-.5 INJECTION, SOLUTION INTRAMUSCULAR; INTRAVENOUS; SUBCUTANEOUS
Status: DISCONTINUED | OUTPATIENT
Start: 2019-03-16 | End: 2019-03-16

## 2019-03-16 RX ORDER — NICOTINE POLACRILEX 4 MG
15-30 LOZENGE BUCCAL
Status: DISCONTINUED | OUTPATIENT
Start: 2019-03-16 | End: 2019-03-19

## 2019-03-16 RX ORDER — HEPARIN SODIUM,PORCINE 10 UNIT/ML
2-5 VIAL (ML) INTRAVENOUS
Status: DISCONTINUED | OUTPATIENT
Start: 2019-03-16 | End: 2019-03-16 | Stop reason: CLARIF

## 2019-03-16 RX ORDER — ETOMIDATE 2 MG/ML
20 INJECTION INTRAVENOUS ONCE
Status: COMPLETED | OUTPATIENT
Start: 2019-03-16 | End: 2019-03-16

## 2019-03-16 RX ORDER — BACLOFEN 10 MG/1
10 TABLET ORAL 3 TIMES DAILY PRN
COMMUNITY

## 2019-03-16 RX ORDER — POTASSIUM CHLORIDE 29.8 MG/ML
20 INJECTION INTRAVENOUS
Status: DISCONTINUED | OUTPATIENT
Start: 2019-03-16 | End: 2019-03-26 | Stop reason: HOSPADM

## 2019-03-16 RX ORDER — ONDANSETRON 2 MG/ML
4 INJECTION INTRAMUSCULAR; INTRAVENOUS EVERY 6 HOURS PRN
Status: DISCONTINUED | OUTPATIENT
Start: 2019-03-16 | End: 2019-03-26 | Stop reason: HOSPADM

## 2019-03-16 RX ORDER — NALOXONE HYDROCHLORIDE 1 MG/ML
0.4 INJECTION INTRAMUSCULAR; INTRAVENOUS; SUBCUTANEOUS ONCE
Status: DISCONTINUED | OUTPATIENT
Start: 2019-03-16 | End: 2019-03-16

## 2019-03-16 RX ORDER — NALOXONE HYDROCHLORIDE 0.4 MG/ML
INJECTION, SOLUTION INTRAMUSCULAR; INTRAVENOUS; SUBCUTANEOUS
Status: COMPLETED
Start: 2019-03-16 | End: 2019-03-16

## 2019-03-16 RX ORDER — ACETAMINOPHEN 650 MG/1
650 SUPPOSITORY RECTAL ONCE
Status: COMPLETED | OUTPATIENT
Start: 2019-03-16 | End: 2019-03-16

## 2019-03-16 RX ORDER — POLYETHYLENE GLYCOL 3350 17 G/17G
1 POWDER, FOR SOLUTION ORAL 2 TIMES DAILY PRN
COMMUNITY
End: 2019-06-10

## 2019-03-16 RX ORDER — PROPOFOL 10 MG/ML
10-20 INJECTION, EMULSION INTRAVENOUS EVERY 30 MIN PRN
Status: DISCONTINUED | OUTPATIENT
Start: 2019-03-16 | End: 2019-03-18

## 2019-03-16 RX ORDER — HEPARIN SODIUM 5000 [USP'U]/.5ML
7500 INJECTION, SOLUTION INTRAVENOUS; SUBCUTANEOUS EVERY 12 HOURS
Status: DISCONTINUED | OUTPATIENT
Start: 2019-03-16 | End: 2019-03-26 | Stop reason: HOSPADM

## 2019-03-16 RX ORDER — POTASSIUM CHLORIDE 1500 MG/1
20-40 TABLET, EXTENDED RELEASE ORAL
Status: DISCONTINUED | OUTPATIENT
Start: 2019-03-16 | End: 2019-03-26 | Stop reason: HOSPADM

## 2019-03-16 RX ADMIN — SODIUM BICARBONATE 50 MEQ: 84 INJECTION, SOLUTION INTRAVENOUS at 16:06

## 2019-03-16 RX ADMIN — SUCCINYLCHOLINE CHLORIDE 150 MG: 20 INJECTION, SOLUTION INTRAMUSCULAR; INTRAVENOUS; PARENTERAL at 12:50

## 2019-03-16 RX ADMIN — PROPOFOL 40 MCG/KG/MIN: 10 INJECTION, EMULSION INTRAVENOUS at 17:05

## 2019-03-16 RX ADMIN — NOREPINEPHRINE BITARTRATE 0.03 MCG/KG/MIN: 1 INJECTION INTRAVENOUS at 23:07

## 2019-03-16 RX ADMIN — PROPOFOL 20 MCG/KG/MIN: 10 INJECTION, EMULSION INTRAVENOUS at 12:55

## 2019-03-16 RX ADMIN — ETOMIDATE 20 MG: 20 INJECTION, SOLUTION INTRAVENOUS at 13:04

## 2019-03-16 RX ADMIN — SODIUM CHLORIDE, POTASSIUM CHLORIDE, SODIUM LACTATE AND CALCIUM CHLORIDE: 600; 310; 30; 20 INJECTION, SOLUTION INTRAVENOUS at 15:44

## 2019-03-16 RX ADMIN — SODIUM CHLORIDE, POTASSIUM CHLORIDE, SODIUM LACTATE AND CALCIUM CHLORIDE: 600; 310; 30; 20 INJECTION, SOLUTION INTRAVENOUS at 22:29

## 2019-03-16 RX ADMIN — ACETAMINOPHEN 650 MG: 650 SUPPOSITORY RECTAL at 12:11

## 2019-03-16 RX ADMIN — HEPARIN SODIUM 7500 UNITS: 5000 INJECTION, SOLUTION INTRAVENOUS; SUBCUTANEOUS at 20:11

## 2019-03-16 RX ADMIN — TAZOBACTAM SODIUM AND PIPERACILLIN SODIUM 4.5 G: 500; 4 INJECTION, SOLUTION INTRAVENOUS at 13:52

## 2019-03-16 RX ADMIN — HYDROMORPHONE HYDROCHLORIDE 0.5 MG: 1 INJECTION, SOLUTION INTRAMUSCULAR; INTRAVENOUS; SUBCUTANEOUS at 16:24

## 2019-03-16 RX ADMIN — NALOXONE HYDROCHLORIDE 0.4 MG: 0.4 INJECTION, SOLUTION INTRAMUSCULAR; INTRAVENOUS; SUBCUTANEOUS at 11:56

## 2019-03-16 RX ADMIN — PROPOFOL 40 MCG/KG/MIN: 10 INJECTION, EMULSION INTRAVENOUS at 20:41

## 2019-03-16 RX ADMIN — VANCOMYCIN HYDROCHLORIDE 2000 MG: 1 INJECTION, POWDER, LYOPHILIZED, FOR SOLUTION INTRAVENOUS at 15:49

## 2019-03-16 RX ADMIN — ACETAMINOPHEN 650 MG: 325 SOLUTION ORAL at 21:29

## 2019-03-16 RX ADMIN — HYDROMORPHONE HYDROCHLORIDE 0.5 MG: 1 INJECTION, SOLUTION INTRAMUSCULAR; INTRAVENOUS; SUBCUTANEOUS at 22:39

## 2019-03-16 RX ADMIN — TAZOBACTAM SODIUM AND PIPERACILLIN SODIUM 4.5 G: 500; 4 INJECTION, SOLUTION INTRAVENOUS at 20:10

## 2019-03-16 RX ADMIN — SODIUM CHLORIDE 2000 ML: 9 INJECTION, SOLUTION INTRAVENOUS at 12:07

## 2019-03-16 RX ADMIN — PANTOPRAZOLE SODIUM 40 MG: 40 INJECTION, POWDER, FOR SOLUTION INTRAVENOUS at 16:10

## 2019-03-16 ASSESSMENT — ACTIVITIES OF DAILY LIVING (ADL)
ADLS_ACUITY_SCORE: 30
ADLS_ACUITY_SCORE: 31

## 2019-03-16 ASSESSMENT — MIFFLIN-ST. JEOR: SCORE: 1823.63

## 2019-03-16 NOTE — ED NOTES
Critical Care Time (RN) Mins: 1 RN for 150min. 1 additional RN for 20 min. After arrival and 1 additional RN for 40 min for intubation.

## 2019-03-16 NOTE — PROGRESS NOTES
ABG obtained per MD order at 1702 from left radial artery. Patient tolerated well. Patient currently on mechanical ventilator, FiO2 40%. RT to follow.

## 2019-03-16 NOTE — PROGRESS NOTES
ICU Staff:     I examined the patient in the ICU. I reviewed the patient's medical records, progress notes, and imaging studies. The patient is a 53 year old woman admitted to the ICU for septic shock due to a left ureteral stone.  The patient had a urologic procedure on March 15, 2019. The patient has a left ureteral stone and underwent left ureteroscopy with holmium laser lithotripsy and left ureteral stent exchange by Dr. Mayito Chauhan under general anesthesia at [facility] as an outpatient.  The patient has a changed mental status and had been lethargic since the procedure. Her oxygen sat. was 72% on room air. The patient's SBP was 81/52 and her temp was 101.3.  Further studies showed evidence of elevated creatinine at 1.64 from her normal baseline, slightly elevated troponin at 0.050, significant leukocytosis (WBC count of 38 K).    PAST MEDICAL HISTORY:  Past Medical History:   Diagnosis Date     Abnormality of gait 7/27/2012     Arrhythmia     with sepsis     Chronic pain     [facility] - yearly, next in summer 2018     CKD (chronic kidney disease) stage 1, GFR 90 ml/min or greater     kidney stones     Colon polyps 1/15    tubular adenomas x 2     Gallstone 6/11/2012     Hyperlipidemia LDL goal <70      Hypertension goal BP (blood pressure) < 140/90      Leukocytosis 6/11/2012     Moderate depressive episode (H)      MS (multiple sclerosis) (H) 2003    Dr Vigil/Gaby - [facility]     Multiple sclerosis (H)      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 Cystoscopy 2. LEFT retrograde pyelogram 3. LEFT JJ stent placement 4. <1hr physician fluoroscopy time;  Surgeon: Epifanio Sapp MD;  Location: [facility] OR     FUSION LUMBAR ANTERIOR, FUSION LUMBAR POSTERIOR TWO LEVELS, COMBINED  10/17/2013    lumbar fusion - Dr Floyd     INSERT PUMP BACLOFEN  04/2017    intrathecal baclofen pump implantation     IRRIGATION AND DEBRIDEMENT LOWER EXTREMITY, COMBINED Right 2015    Right ankle I&D d/t infection     OPEN REDUCTION INTERNAL FIXATION ANKLE  5/15    Right Bimalleolar ankle fx ORIF     OPEN REDUCTION INTERNAL FIXATION ANKLE Right 11/2015    Revision due to spasms pulling screws out of ankle     SINUS SURGERY  2011    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     XR LUMBAR EPIDURAL INJECTION INCL IMAGING  3/14    Left L4-5 Epidural Dr Winter        MEDICATIONS:  Current Facility-Administered Medications   Medication     glucose gel 15-30 g    Or     dextrose 50 % injection 25-50 mL    Or     glucagon injection 1 mg     heparin lock flush 10 UNIT/ML injection 2-5 mL     heparin sodium injection 5,000 Units     HYDROmorphone (PF) (DILAUDID) injection 0.3-0.5 mg     insulin aspart (NovoLOG) inj (RAPID ACTING)     lactated ringers infusion     lidocaine (LMX4) cream     lidocaine 1 % 0.5-5 mL     magnesium sulfate 4 g in 100 mL sterile water (premade)     melatonin tablet 1 mg     naloxone (NARCAN) injection 0.1-0.4 mg     naloxone (NARCAN) injection  0.1-0.4 mg     naloxone (NARCAN) injection 0.1-0.4 mg     naloxone (NARCAN) injection 0.4 mg     pantoprazole (PROTONIX) 40 mg IV push injection     piperacillin-tazobactam (ZOSYN) intermittent infusion 4.5 g     potassium chloride (KLOR-CON) Packet 20-40 mEq     potassium chloride 10 mEq in 100 mL intermittent infusion with 10 mg lidocaine     potassium chloride 10 mEq in 100 mL sterile water intermittent infusion (premix)     potassium chloride 20 mEq in 50 mL intermittent infusion     potassium chloride ER (K-DUR/KLOR-CON M) CR tablet 20-40 mEq     propofol (DIPRIVAN) infusion    And     propofol (DIPRIVAN) infusion 10-20 mg     propofol (DIPRIVAN) infusion     sodium chloride (PF) 0.9% PF flush 5-50 mL     vancomycin (VANCOCIN) 2,000 mg in sodium chloride 0.9 % 500 mL intermittent infusion        ALLERGIES:  Allergies   Allergen Reactions     Lisinopril      Lip swelling     Cyclobenzaprine Other (See Comments)     Per 4-10-17 H&P by Yanna Dugan PA-C.     Flexeril [Cyclobenzaprine Hcl]      Got confused         SOCIAL HISTORY:  Social History     Socioeconomic History     Marital status:      Spouse name: Fernando     Number of children: 3     Years of education: 14     Highest education level: Not on file   Occupational History     Employer: UNEMPLOYED   Social Needs     Financial resource strain: Not on file     Food insecurity:     Worry: Not on file     Inability: Not on file     Transportation needs:     Medical: Not on file     Non-medical: Not on file   Tobacco Use     Smoking status: Former Smoker     Packs/day: 0.50     Types: Cigarettes     Last attempt to quit: 2013     Years since quittin.6     Smokeless tobacco: Never Used     Tobacco comment: since age 17   Substance and Sexual Activity     Alcohol use: No     Drug use: No     Sexual activity: Yes     Partners: Male   Lifestyle     Physical activity:     Days per week: Not on file     Minutes per session: Not on file     Stress: Not on  file   Relationships     Social connections:     Talks on phone: Not on file     Gets together: Not on file     Attends Restorationist service: Not on file     Active member of club or organization: Not on file     Attends meetings of clubs or organizations: Not on file     Relationship status: Not on file     Intimate partner violence:     Fear of current or ex partner: Not on file     Emotionally abused: Not on file     Physically abused: Not on file     Forced sexual activity: Not on file   Other Topics Concern     Parent/sibling w/ CABG, MI or angioplasty before 65F 55M? No      Service Not Asked     Blood Transfusions Not Asked     Caffeine Concern Yes     Comment: occas     Occupational Exposure Not Asked     Hobby Hazards Not Asked     Sleep Concern Not Asked     Stress Concern Not Asked     Weight Concern Not Asked     Special Diet Not Asked     Back Care Not Asked     Exercise Yes     Comment: as able     Bike Helmet Not Asked     Seat Belt Yes     Self-Exams Not Asked   Social History Narrative     Not on file       FAMILY HISTORY:  Family History   Problem Relation Age of Onset     C.A.D. Father         with CHF      Cancer Father         ? unsure type - abdominal      Hypertension Mother      Thyroid Disease Mother         goiter      Breast Cancer Sister 58     Thyroid Disease Son      Cancer - colorectal No family hx of         PHYSICAL EXAM:  Vital Signs: /54   Pulse 81   Temp 101.3  F (38.5  C) (Oral)   LMP 12/11/2011   SpO2 94%     GEN: the patient is intubated and ventilated.     HEENT:  No massed or swelling     Chest: CTA bilaterally     Cor: RRR no murmur.     ABD: soft, no masses, no organomegaly, no hernias     Ext: lower extremity pigment deposition and edema bilaterally. Right upper extremity PICC line in place.       Neuro: Patient is intubated and vented.     A/P: The patient is a The patient is a 53 year old woman admitted to the ICU for septic shock due to a left ureteral  stone.  The patient had a urologic procedure on March 15, 2019. The patient has a left ureteral stone and underwent left ureteroscopy with holmium laser lithotripsy and left ureteral stent exchange by Dr. Mayito Chauhan under general anesthesia at [facility] as an outpatient.  The patient has a changed mental status and had been lethargic since the procedure. Her oxygen sat. was 72% on room air. The patient's SBP was 81/52 and her temp was 101.3.  Further studies showed evidence of elevated creatinine at 1.64 from her normal baseline, slightly elevated troponin at 0.050, significant leukocytosis (WBC count of 38 K).    Neuro: Acute  septic encephalopathy.     Cardiac: likely demand ischemia with the serum troponin elevated to 0.050; will keep on the patient on the cardiac monitor and follow the serum troponin.      Pulm: Acute respiratory failure; will provide full vent support.       GI: NPO; no acute issues.     : JULIANNE and her serum Cr. was elevated to 1.64 . Left ureteral stone; will consult Urology     HEME: No acute issues.     ENDO: No acute issues.     ID: Severe sepsis with altered mental status, fever, leukocytosis (WBC count of 38 K), may be related to the patient's urologic intervention on 15 March 2019.     CODE: Full Code    Prophylactic measures:    DVT prevention      The patient is critically ill due to septic shock. The patient needs ICU supportive care for septic shock .  35 min of Critical Care time exclusive of any time required to perform bedside procedures.  The critical care time was required to  examine the patient, to review the patient's medical records, and to coordinate the patient's ICU care.     Fransisco Martinez M.D., Ph.D.

## 2019-03-16 NOTE — ED PROVIDER NOTES
History     Chief Complaint:  Altered Mental Status      HPI  History limited due to patient condition  Amanda Richardson is a 53 year old female with history of MS, a-fib, sepsis and UTI who presents to the emergency department for evaluation of AMS. EMS reports that the patient was seen here yesterday for kidney stone removal with Dr. Chauhan, urology. After returning back to [facility] staff noted the patient seemed more lethargic. However today while she was going to eat staff noted the patient seemed more decompensated. Patient was noted to be satting in the 70s at room air. She was placed on 4 lpm and was at 91. After EMS arrived patient was placed on 15 lpm and began satting at 95. Patients current presentation is not unusual in the setting of sepsis secondary to UTI which she has had several times in the past.     Allergies:  Lisinopril  Cyclobenzaprine  Flexeril [Cyclobenzaprine Hcl]     Medications:    amitriptyline (ELAVIL) 100 MG tablet  ASPIRIN PO  atorvastatin (LIPITOR) 10 MG tablet  baclofen (LIORESAL) 10 MG tablet  cephALEXin (KEFLEX) 500 MG capsule  cholecalciferol 1000 units TABS  furosemide (LASIX) 40 MG tablet  gabapentin (NEURONTIN) 600 MG tablet  hydrochlorothiazide (MICROZIDE) 12.5 MG capsule  losartan (COZAAR) 50 MG tablet  metFORMIN (GLUCOPHAGE) 500 MG tablet  metoprolol succinate (TOPROL-XL) 50 MG 24 hr tablet  Multiple Vitamin (MULTI-VITAMIN PO)  omega-3 fatty acids (FISH OIL) 1200 MG capsule  oxyCODONE IR (ROXICODONE) 15 MG tablet  polyethylene glycol (MIRALAX/GLYCOLAX) packet  POTASSIUM CHLORIDE ER PO  senna-docusate (SENOKOT-S/PERICOLACE) 8.6-50 MG tablet  baclofen (LIORESAL) 10 MG tablet  bisacodyl (DULCOLAX) 10 MG suppository  docusate sodium (COLACE) 100 MG capsule  polyethylene glycol (MIRALAX/GLYCOLAX) packet    Past Medical History:    Abnormality of gait  Arrhythmia   Chronic pain   CKD   Colon polyps  Gallstone   Hyperlipidemia  Hypertension  Leukocytosis  Moderate depressive  episode    MS (multiple sclerosis)   Multiple sclerosis    Non Hodgkin's lymphoma  Nonallopathic lesion of cervical region  Numbness and tingling   Obesity  Other chronic pain   Pain in joint, pelvic region and thigh  Prediabetes   Spinal stenosis, lumbar  T-cell lymphoma   Tobacco abuse  Type 2 diabetes    Past Surgical History:    Combined cystoscopy, retrogrades, ureteroscopy  Lumbar fusion anterior and posterior  Insert pump baclofen  IND  ORIF ankle  Sinus surgery  Lumbar epidural injection     Family History:    CAD  Cancer  HTN  Thyroid disease    Social History:  Marital Status:   [2]  Social History     Tobacco Use     Smoking status: Former Smoker     Packs/day: 0.50     Types: Cigarettes     Last attempt to quit: 2013     Years since quittin.6     Smokeless tobacco: Never Used     Tobacco comment: since age 17   Substance Use Topics     Alcohol use: No     Drug use: No        Review of Systems   Unable to perform ROS: Mental status change       Physical Exam     Patient Vitals for the past 24 hrs:   BP Temp Temp src Pulse Heart Rate SpO2   19 1400 105/54 -- -- 81 81 94 %   19 1346 101/52 -- -- 82 -- --   19 1345 99/53 -- -- 81 83 97 %   19 1332 94/52 -- -- 83 -- 96 %   19 1330 99/50 -- -- 84 -- 97 %   19 1315 104/51 -- -- 87 88 --   19 1300 128/67 -- -- 92 91 95 %   19 1252 118/63 -- -- 89 89 91 %   19 1205 -- 101.3  F (38.5  C) Oral -- -- --   19 1200 -- -- -- -- 96 --   19 1154 -- -- -- -- 92 94 %   19 1153 -- 97.1  F (36.2  C) Temporal -- 93 95 %   19 1151 111/55 -- -- 91 93 94 %   19 1150 -- -- -- -- 92 97 %   19 1149 -- -- -- -- 93 94 %   19 1147 (!) 81/52 -- -- 92 -- 96 %        Physical Exam  Nursing note and vitals reviewed.  Constitutional: Pt altered. Lying in bed. Unconscious, though arouses to voice.   HENT:   Poor dentition.   Eyes: EOMI, nonicteric sclera  Cardiovascular: Normal  rate, regular rhythm, no murmurs, rubs, or gallops  Pulmonary/Chest: Effort normal and breath sounds normal. No respiratory distress. No wheezes. No rales.   Abdominal: Soft.  nondistended, no guarding or rigidity.   Musculoskeletal: Right leg is shortened and externally rotated.   Neurological: Altered. Somnolent. Awakens eyes to pain. Follows simple commands. Able to wiggle all fingers and toes.  Skin: Skin is warm and dry. No rash noted.       Emergency Department Course     ECG:  ECG taken at 1148, ECG read at 1234  Normal sinus rhythm  Left axis deviation  Left bundle branch block  Abnormal ECG  Rate 94 bpm. NH interval 208 ms. QRS duration 126 ms. QT/QTc 390/487 ms. P-R-T axes 42 - 51 54.    Imaging:  Radiology findings were communicated with the patient who voiced understanding of the findings.      Abd/pelvis CT no contrast - Stone Protocol  Preliminary Result  IMPRESSION:  1. Left ureter stent in place. No ureter stone is seen. No convincing  hydronephrosis. A few small stone fragments within each kidney.  2. Fracture through the proximal right femur with increased impaction  noted. Degenerative joint disease appears severe at the right hip and  mild at the left hip.  3. Cholelithiasis.  4. Bibasilar areas of consolidation and atelectasis. This appears  increased at the right base. Cannot exclude multifocal pneumonia.    Head CT w/o contrast  Preliminary Result  IMPRESSION: No acute pathology. No bleed, mass, or acute infarcts are  seen. No change.    Readings are preliminary at time of note       Chest  XR, 1 view PORTABLE  Final Result  IMPRESSION: ET tube and NG tube are in good position.  MURTAZA FREITAS MD    Reading per radiology     Laboratory:  Laboratory findings were communicated with the patient who voiced understanding of the findings.    Lactic acid: 1.2  Lactic acid: 1.6  ISTAT [place] 1233: pH 7.23 (L), PCO2 69 (H), PO2 23 (L), Bicarb 29 (H)  ISTAT [place] 1155:  PH 7.27 (L), PCO2 68 (H), PO2 63 (H),  "Bicarb 31 (H)  Flu: negative  Blood gas: pH 7.26 (L), PCO2 70 (H), PO2 61 (H), Bicarb 32 (H)  Troponin (Collected 1200): 0.050 (H)   Urine culture: pending  UA: large blood (A), protein albumin 100 (A), nitrite positive (A), large leuk esterase (A), WBC 45 (H),  (H), WBC clumps present (A), many bacteria (A), mucous present (A), amorphus crystals moderate (A)  CBC: WBC 38.5 (H), HGB 9.8 (L),   CMP: Glucose 123 (H), BUN 31 (H), GFR 35 (L), o/w WNL (Creatinine 1.64 (H))    Procedures:     Intubation      INDICATION: Airway protection. and hypercapnic respiratory failure     PERFORMED BY: Andrew Nazario MD,     CONSENT: The procedure was performed in an emergent situation.    TIMEOUT: Immediately prior to procedure a \"time out\" was called to verify the correct patient, procedure, equipment, support staff and site/side marked as required.    INTUBATION METHOD: Glidescope    PATIENT STATUS: RSI    PREOXYGENATION: Mask    PRETREATMENT MEDICATIONS: None    SEDATIVES: Etomidate    PARALYTIC: succinylcholine    LARYNGOSCOPE SIZE: hyperacute Glidescope 3     TUBE SIZE: 7.5 cuffed with cuff inflated after placement  Number of attempts: 1  Cricoid pressure: no  Cords visualized: yes    POST-PROCEDURE ASSESSMENT: Breath sounds equal bilaterally with chest rise and absent over the epigastrium, Chest x-ray interpreted by me demonstrating endotracheal tube in appropriate position and CO2 detector.    ETT TO TEETH: 23 cm  Tube secured with: ETT kim    Patient tolerated the procedure well with no immediate complications.  COMPLICATIONS:  None       Interventions:  1156 Narcan 0.4 mg IV  1207 NS 1L IV Bolus x2  1211 Tylenol 650 rectal  1250 Succinylcholine 150 mg IV  1304 Etomidate 20 mg IV  Propofol gtt  1352 Zosyn 4.5 g IV  Vancomycin      Impression & Plan      Medical Decision Making:  Pt presents with altered mental status. Found to have fever, leukocytosis, and evidence of UTI on UA. Most likely symptoms " point to severe sepsis, most likely due to urinary source given recent instrumentation and treatment for similar. May also be bacteremia, cultures pending. No signs pneumonia/influenza. Pt quite somnolent on arrival. Pt received pain meds around 0700 this AM, so dose of narcan was tried, which while it seemed to wake pt up a little bit, her VBG got worse and worse. Pt too altered for bipap to be safe, therefore intubation necessary, which was without complication. No desaturation event was noted. Pt did have mild hypotension on arrival, but this rapidly improved with IVF, and no subsequent hypotension was noted. Don't believe need central line or pressors at this time. Pt given vancomycin and zosyn for coverage of severe sepsis. Dr. Greco accepts for admission.     Diagnosis:    ICD-10-CM    1. Severe sepsis (H) A41.9 Blood culture ONE site    R65.20 Lactic acid whole blood     Methicillin resistant staph aureus cult   2. Acute pyelonephritis N10      Disposition:   Admission    Critical Care time was 45 minutes for this patient excluding procedures.     CMS Diagnoses:   The patient has signs of Severe Sepsis as evidenced by:    1. 2 SIRS criteria, AND  2. Suspected infection, AND   3. Organ dysfunction: Acute encephalopathy due to sepsis    Time severe sepsis diagnosis confirmed = 1217 as this was the time when SIRS confirmed (leukocytosis) AMS was present at time of presentation.       3 Hour Severe Sepsis Bundle Completion:  1. Initial Lactic Acid Result:   Recent Labs   Lab Test 03/16/19  1353 03/16/19  1233 03/16/19  1200   LACT 1.2 1.8 1.6     2. Blood Cultures before Antibiotics: Yes  3. Broad Spectrum Antibiotics Administered: Yes     Anti-infectives (From now, onward)    Start     Dose/Rate Route Frequency Ordered Stop    03/16/19 2000  piperacillin-tazobactam (ZOSYN) intermittent infusion 4.5 g      4.5 g  200 mL/hr over 30 Minutes Intravenous EVERY 6 HOURS 03/16/19 1517      03/16/19 1410  vancomycin  (VANCOCIN) 2,000 mg in sodium chloride 0.9 % 500 mL intermittent infusion      2,000 mg  over 2 Hours Intravenous ONCE 03/16/19 1409          4. 2000 ml of IV fluids.  Ideal body weight: 63.9 kg (140 lb 13.3 oz)  Adjusted ideal body weight: 83.5 kg (184 lb 1.6 oz)    Severe Sepsis reassessment:  1. Repeat Lactic Acid Level: 1.2  2. MAP>65 after initial IVF bolus, will continue to monitor fluid status and vital signs     Scribe Disclosure:  I, Ricki Solorio, am serving as a scribe at 12:04 PM on 3/16/2019 to document services personally performed by Andrew Nazario MD, based on my observations and the provider's statements to me.     [facility] EMERGENCY DEPARTMENT       Andrew Nazario MD  03/16/19 6802

## 2019-03-16 NOTE — TELEPHONE ENCOUNTER
[facility] TELEPHONE ENCOUNTER    Chief Complaint   Patient presents with     lethargy     Altered Mental Status       Amanda Richardson is a 53 year old  (1965),Nurse called today to report: Lethargy/AMS    ASSESSMENT/PLAN  Underwent cystoscopy yesterday with stent placement under general. Was sleepy overnight and this morning. Attempted feeding her breakfast in hopes she would perk up - no change in status. Writer familiar with patient - suffers from recurrent UTIs with rapid onset sepsis - has been on keflex for UTI since 3/10. Hospitalized PTA to TCU for sepsis 2/2 UTI.     Okay to send to ED for eval 2/2 patient's hx of rapid onset of sepsis while on abx for UTI per UC    Gabriela Rodas, BAYLEE CNP

## 2019-03-16 NOTE — PHARMACY-ADMISSION MEDICATION HISTORY
Admission medication history interview status for this patient is complete. See [facility] admission navigator for allergy information, prior to admission medications and immunization status.     Medication history interview source(s):Caregiver  Medication history resources (including written lists, pill bottles, clinic record):[facility]  Primary pharmacy:[facility]    Changes made to PTA medication list:  Added: -  Deleted: -  Changed: -    Actions taken by pharmacist (provider contacted, etc):None     Additional medication history information:None    Medication reconciliation/reorder completed by provider prior to medication history? No    Do you take OTC medications (eg tylenol, ibuprofen, fish oil, eye/ear drops, etc)? Y    Prior to Admission medications    Medication Sig Last Dose Taking? Auth Provider   amitriptyline (ELAVIL) 100 MG tablet Take 1 tablet (100 mg) by mouth At Bedtime 3/15/2019 at 2000 Yes Julius Hassan MD   ASPIRIN PO Take 162 mg by mouth daily 3/12/2019 at Unknown time Yes Unknown, Entered By History   atorvastatin (LIPITOR) 10 MG tablet Take 1 tablet (10 mg) by mouth daily 3/16/2019 at 0800 Yes Julius Hassan MD   baclofen (LIORESAL) 10 MG tablet Take 10 mg by mouth 3 times daily 3/16/2019 at 0800 Yes Unknown, Entered By History   cephALEXin (KEFLEX) 500 MG capsule Take 500 mg by mouth 3 times daily 3/16/2019 at 0800 Yes Reported, Patient   cholecalciferol 1000 units TABS Take 1,000 Units by mouth daily 3/16/2019 at 0800 Yes Unknown, Entered By History   furosemide (LASIX) 40 MG tablet Take 1 tablet (40 mg) by mouth daily 3/16/2019 at 0800 Yes Isaak Henao DO   gabapentin (NEURONTIN) 600 MG tablet Take 1 tablet (600 mg) by mouth 3 times daily 3/16/2019 at Unknown time Yes Isaak Henao DO   hydrochlorothiazide (MICROZIDE) 12.5 MG capsule 1 capsule (12.5 mg) by Oral or Feeding Tube route daily 3/16/2019 at 0800 Yes Isaak Henao DO   losartan (COZAAR) 50 MG tablet Take 1  tablet (50 mg) by mouth daily 3/16/2019 at 0800 Yes Drake Greco MD   metFORMIN (GLUCOPHAGE) 500 MG tablet Take 1 tablet (500 mg) by mouth daily (with dinner) 3/14/2019 at 1700 Yes Julius Hassan MD   metoprolol succinate (TOPROL-XL) 50 MG 24 hr tablet TAKE 1 TABLET BY MOUTH ONCE DAILY 3/16/2019 at 0800 Yes Julius Hassan MD   Multiple Vitamin (MULTI-VITAMIN PO) Take 1 tablet by mouth daily  3/16/2019 at 0800 Yes Reported, Patient   omega-3 fatty acids (FISH OIL) 1200 MG capsule Take 1 capsule by mouth daily. 3/16/2019 at 0800 Yes Reported, Patient   oxyCODONE IR (ROXICODONE) 15 MG tablet Take 0.5-1 tablets (7.5-15 mg) by mouth every 4 hours as needed for moderate to severe pain Limit 4 tablet(s) per day. 3/16/2019 at 0743 Yes Gabriela Rodas APRN CNP   polyethylene glycol (MIRALAX/GLYCOLAX) packet Take 17 g by mouth daily 3/16/2019 at 0800 Yes Unknown, Entered By History   POTASSIUM CHLORIDE ER PO Take 20 mEq by mouth daily 3/16/2019 at 0800 Yes Reported, Patient   senna-docusate (SENOKOT-S/PERICOLACE) 8.6-50 MG tablet Take 3 tablets by mouth 2 times daily 3/16/2019 at 0800 Yes Isaak Henao DO   baclofen (LIORESAL) 10 MG tablet Take 10 mg by mouth daily as needed for muscle spasms Unknown at Unknown time  Unknown, Entered By History   bisacodyl (DULCOLAX) 10 MG suppository Place 1 suppository (10 mg) rectally daily as needed for constipation Unknown at Unknown time  Isaak Henao DO   docusate sodium (COLACE) 100 MG capsule Take 100 mg by mouth 2 times daily as needed for constipation Unknown at Unknown time  Reported, Patient   polyethylene glycol (MIRALAX/GLYCOLAX) packet Take 1 packet by mouth 2 times daily as needed for constipation Unknown at Unknown time  Unknown, Entered By History

## 2019-03-16 NOTE — PROGRESS NOTES
Respiratory Therapy Note    Patient seen resting in bed on mechanical ventilator settings:    Ventilation Mode: CMV/AC  (Continuous Mandatory Ventilation/ Assist Control)  FiO2 (%): 40 %  Rate Set (breaths/minute): (S) 16 breaths/min  Tidal Volume Set (mL): 500 mL  PEEP (cm H2O): 5 cmH2O  Oxygen Concentration (%): (S) 35 %  Resp: 20    Respiratory rate 20, breath sounds clear/diminished, and SpO2 93%. Suctioning moderate, creamy, thick secretions from ETT. RT will continue to monitor.    Linette Heck  5:22 PM March 16, 2019

## 2019-03-16 NOTE — ED NOTES
Patient comes in via EMS for evaluation of altered mental status. Per report, patient was discharged from [facility] yesterday after a procedure to remove a kidney stone, since arriving back at [facility] was reported to be more lethargic, today they had a hard time getting her up to wheelchair for lunch today, did eat some lunch. Of note patient has a known right femur fracture. Upon arrival of EMS patient was not responding much, SpO2 72% on RA. Upon arrival, was at 95% on 15L via NRB, minimally responding to verbal stimulus. Per report, patient has had a history of similar symptoms due to urosepsis.

## 2019-03-16 NOTE — PROGRESS NOTES
Intensivist was consulted and I spoke with Dr. Martinez regarding the care of this patient.  We will plan to get a PICC line placed for IV access and continue to monitor patient on the ventilator with broad-spectrum antibiotic.  I also spoke with patient's spouse over the phone and answered his questions and concerns.

## 2019-03-16 NOTE — OP NOTE
Procedure Date: 03/15/2019      SURGEON:  Mayito Chauhan MD      PREOPERATIVE DIAGNOSIS:  Left ureteral stone.      POSTOPERATIVE DIAGNOSIS:  Left ureteral stone.       PROCEDURE PERFORMED:  Cystoscopy, left ureteral stent exchange, left retrograde pyelogram, interpretation of fluoroscopic images, left ureteroscopy with holmium lithotripsy and stone basketing, 22 modifier for difficult lengthy case.      ANESTHESIA:  General.      COMPLICATIONS:  None.      INDICATIONS FOR PROCEDURE:  Amanda Richardson is a 53-year-old woman who presented with a large proximal left ureteral stone and urinary tract infection and had a stent placed previously.  She now presents for stent and stone removal.      DETAILS OF THE PROCEDURE:  Risks and benefits of procedure were explained in detail to the patient and informed consent was obtained from the patient.  The patient was brought to the operating room and placed supine on the table, where she underwent a general endotracheal anesthetic.  She was then moved down to the dorsal lithotomy position.  She was prepped and draped in sterile fashion.       Introduced the 22-Azeri cystoscope through the urethra into the bladder and performed cystoscopy.  There were no urothelial abnormalities identified.  The urine was somewhat cloudy.  I grasped the stent and pulled it to the level of the urethral meatus and then under fluoroscopic guidance, I cannulated the stent with a Glidewire.  I removed the stent and then clamped the Glidewire to the drape.  Alongside the Glidewire, I passed the rigid ureteroscope through the urethra, into the bladder and up the left ureter.  I could see the stone on x-ray and just below the stone, there was a portion of the ureter was quite narrow and took a sharp turn.  I was not able to pass the rigid scope above this area in order to get to the stone.  Therefore, I passed a second Glidewire into the left kidney and backloaded off the rigid scope.  Over this  second Glidewire, I then passed the flexible ureteroscope.  I performed a retrograde pyelogram through the scope and performed complete renoscopy.  The patient had a lot of debris in her kidney and cloudy urine, but no stones in the kidney.  I slowly pulled back the ureteroscope until I could see the stone.  The stone was just above the narrow and twisting area of the ureter.  I realized I would likely have a difficult time getting the scope up to the stone multiple times, so I decided to attempt to dust the stone.  I moved the stone up into the kidney with the basket and then used the dusting setting with a 200 micron holmium fiber to dust the stone.  I dusted until there was one larger fragment remaining.  I removed this fragment with the flexible ureteroscope and sent it for analysis.  I then reinserted the flexible ureteroscope and performed another ureteroscopy.  This time, I could not get past the narrow area in the ureter.  I attempted to pass a Glidewire alongside the existing Glidewire, but this also would not go the correct direction.  I removed the flexible ureteroscope and then used a dual lumen catheter to pass a second Glidewire into the left kidney.  I then attempted to pass a flexible scope over the Glidewire once again, but the scope would not go up past the narrow area in the proximal ureter.  Regardless, I had previously felt that I had dusted the stone to my satisfaction, so I finished the case at this time.  I removed the second Glidewire and the flexible ureteroscope.  Over the remaining Glidewire, I passed a 5 x 24 double-J ureteral stent.  The wire was pulled back and a good curl could be seen in the renal pelvis on fluoroscopy.  I reinserted the scope and a good curl was seen in the bladder under direct visualization. The bladder was drained and the scope was removed.  I placed a B and O suppository and the procedure was concluded.      Due to the large stone burden in the kidney and narrow  ureter, this was an especially difficult and lengthy case, taking over 1 hour     The patient tolerated the procedure without complications.  She went to post-anesthetic care unit in good condition.  She will go home from there.  I will see her back in 2 weeks for stent removal in the office.  I will have her get a noncontrast CT scan before stent removal to make certain that her stone was dusted adequately and that she passed all of her fragments.         JONATHAN FULLER MD             D: 03/15/2019   T: 2019   MT: LUIS      Name:     JIMMY SYLVESTER   MRN:      [id number]        Account:        [id number]   :      1965           Procedure Date: 03/15/2019      Document: [id number]

## 2019-03-16 NOTE — PLAN OF CARE
soft restraints restraints continued 3/16/2019    Clinical Justification: Pulling lines, pulling tubes, and pulling equipment  Less Restrictive Alternative: 1:1 patient care, Repositioning, Re-evaluate equipment, Disguise equipment, Pain management, Alarm, De-escalation, Reorientation   ,     New orders placed Yes     Restraints intiated in ED. In place upon transfer to ICU    Marlena Vides    ICU End of Shift Summary.  For vital signs and complete assessments, please see documentation flowsheets.     Pertinent assessments: Patient an ED admission. Remains intubated and sedated. Restless at times. Bobo in place  With good urine output. Restraints on for  Patient safety.   Major Shift Events: admission to icu. picc placed  Plan (Upcoming Events): continue to  Monitor.  Discharge/Transfer Needs: to be determined    Bedside Shift Report Completed : yes  Bedside Safety Check Completed:yes

## 2019-03-16 NOTE — H&P
Hospitalist Admission Note([facility])    Name: Amanda Richardson    MRN: [id number]          YOB: 1965    Age: 53 year old  Date of admission: 3/16/2019  Primary care provider: Julius Hassan              Assessment:       Brief summary of admission assessment:Amanda Richardson is a 53 year old  female with a significant past medical history of complex medical problems with frequent hospitalization discharged last month on February 15, 2019 after 2 weeks stay in the hospital for septic shock, respiratory failure who currently presents from nursing home with decreased level of consciousness.  Patient had a urologic procedure yesterday on March 15, 2019.  She had cystoscopy, left ureteroscopy with holmium laser lithotripsy and left ureteral stent exchange by Dr. Mayito Chauhan under general anesthesia at [facility] outpatient surgery.  Patient remained lethargic since the procedure was done and had a hard time getting up to a wheelchair to eat.  Emergency medical team were called and patient was found to have hypoxia with oxygen saturation 72% room air and patient was placed on oxygen and was taken to the emergency department for evaluation.  The emergency department patient was stabilized and required emergency intubation and further evaluations done.  On arrival to the emergency department patient's blood pressure was 81/52, temperature was 101.3 and mental status was depressed.  Further studies showed evidence of elevated creatinine at 1.64 from her normal baseline, slightly elevated troponin at 0.050, significant leukocytosis with WBC count of 38.5 with left shift.  Patient was then admitted to our service for further care.      Admission diagnoses:      #1.  Acute encephalopathy: Likely septic encephalopathy.  Suspect anesthesia and narcotic medications used during procedure yesterday may also explain her depressed level of consciousness as well    #2.  Severe sepsis on  the basis of altered mental status, fever, leukocytosis, recent urologic intervention.    #3.  Acute respiratory failure secondary to sepsis    #4. Urolithiasis with left ureteral stone    #5. JULIANNE     Comorbid medical conditions: Complex medical problems including type 2 diabetes, obesity, right femoral neck fracture, chronic pain syndrome, multiple sclerosis, recurrent sepsis and septic shock urinary tract infection.        Plan/MDM:       > Admission Status: Will admit patient to hospitalist service as inpatient as patient likely need over two mid night stays in the hospital.     >Care plan:    --Admit to ICU, continue vent support, broad-spectrum antibiotic, intake and output monitoring, IV fluid , monitor culture  -- Sedate with propofol,ask intensivist to manage vent   -- involve urology team   -- keep NPO   --Obtain CT scan of the head as well as noncontrast abdominal CT  --Continue to monitor closely.    >Supportive care:IV fluids continued  Pain management: IV narcotics  Nausea and vomiting control measures  Respiratory therapy    >Diet:Keep NPO for now    >Activity:Advance activity as tolerated    >Education/Counseling :Discussed treatment plan with the patient    >Consults:Inpatient consult with intensivist, urologist    >VTE prophylactic measures:prophylaxis against venous thromboembolism    >Therapies:Respiratory therapy      >Additional orders:      --Care plan discussed with the patient/family and agreed to care plan   --Patient will be transferred to care of hospitalist attending for further evaluation and management as appropriate   --Old medical orders reviewed   --imaging result independently reviewed by me     (See orders placed for this visit by me )     - Home medication reviewed and will be continued as appropriate once pharmacy reconciliation is completed         Code Status/Disposition:     >Code Status:Full Code      >Disposition:anticipate discharge to skilled care facility and Anticipate  discharge in 4-5 days        Disclaimer: This note consists of symbols derived from keyboarding, dictation and/or voice recognition software. As a result, there may be errors in the script that have gone undetected. Please consider this when interpreting information found in this chart.             Chief Complaint:     Decreased level of consciousness     Unable to obtain a history from the patient due to critical condition          History of Present Illness:      This patient is a 53 year old  female with a significant past medical history of complex medical problems stated above who presents with the following condition requiring a hospital admission:      Severe sepsis and respiratory failure      Patient is a 53-year-old female who is well-known to our service from previous including multiple sclerosis, atrial fibrillation, sepsis and recurrent urinary tract infections, urolithiasis who was admitted to [facility] with septic shock and requiring prolonged hospitalization discharged mid February to a nursing home currently presenting with decreased level of consciousness.  Of note patient had urologic procedure yesterday at our outpatient surgery by urology service.  Since discharge from the hospital, patient continued to decline with decreased level of consciousness and inability to participate in care and was unable to eat.  Paramedics were called and patient was found to have hypoxia and with a decreased level of consciousness.  She was taken to the emergency department for evaluation which revealed hypotension, fever leukocytosis and decreased level of consciousness.  Patient was given some Narcan with no significant improvement.  Patient was emergently intubated and was further evaluated and our service was contacted to admit this patient to ICU for further management.  When I saw the patient in the emergency department, patient is already intubated on mechanical ventilator.  Her blood pressure  was stabilized.  She is not tachycardic.  Patient is sedated.  Care plan was discussed with emergency department physician.         Past Medical History:     Past Medical History:   Diagnosis Date     Abnormality of gait 7/27/2012     Arrhythmia     with sepsis     Chronic pain     [facility] - yearly, next in summer 2018     CKD (chronic kidney disease) stage 1, GFR 90 ml/min or greater     kidney stones     Colon polyps 1/15    tubular adenomas x 2     Gallstone 6/11/2012     Hyperlipidemia LDL goal <70      Hypertension goal BP (blood pressure) < 140/90      Leukocytosis 6/11/2012     Moderate depressive episode (H)      MS (multiple sclerosis) (H) 2003    Dr Vigil/Gaby - [facility]     Multiple sclerosis (H)      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 Cystoscopy 2. LEFT retrograde pyelogram 3. LEFT JJ stent placement 4. 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 Father         with CHF      Cancer Father         ? unsure type - abdominal      Hypertension Mother      Thyroid Disease Mother         goiter      Breast Cancer Sister 58     Thyroid Disease Son      Cancer - colorectal No family hx of             Allergies:     Allergies   Allergen Reactions     Lisinopril      Lip swelling     Cyclobenzaprine Other (See Comments)     Per 4-10-17 H&P by Yanna Dugan PA-C.     Flexeril [Cyclobenzaprine Hcl]      Got confused              Medications:        Prior to Admission medications    Medication Sig Last Dose Taking? Auth Provider   amitriptyline (ELAVIL) 100 MG tablet Take 1 tablet (100 mg) by mouth At Bedtime 3/15/2019 at 2000 Yes Julius Hassan MD   ASPIRIN PO Take 162 mg by mouth daily 3/12/2019 at Unknown time Yes Unknown, Entered By History   atorvastatin (LIPITOR) 10 MG tablet Take 1 tablet (10 mg) by mouth daily 3/16/2019 at 0800 Yes Julius Hassan MD   baclofen (LIORESAL)  10 MG tablet Take 10 mg by mouth 3 times daily 3/16/2019 at 0800 Yes Unknown, Entered By History   cephALEXin (KEFLEX) 500 MG capsule Take 500 mg by mouth 3 times daily 3/16/2019 at 0800 Yes Reported, Patient   cholecalciferol 1000 units TABS Take 1,000 Units by mouth daily 3/16/2019 at 0800 Yes Unknown, Entered By History   furosemide (LASIX) 40 MG tablet Take 1 tablet (40 mg) by mouth daily 3/16/2019 at 0800 Yes Isaak Henao DO   gabapentin (NEURONTIN) 600 MG tablet Take 1 tablet (600 mg) by mouth 3 times daily 3/16/2019 at Unknown time Yes Isaak Henao DO   hydrochlorothiazide (MICROZIDE) 12.5 MG capsule 1 capsule (12.5 mg) by Oral or Feeding Tube route daily 3/16/2019 at 0800 Yes Isaak Henao DO   losartan (COZAAR) 50 MG tablet Take 1 tablet (50 mg) by mouth daily 3/16/2019 at 0800 Yes Drake Greco MD   metFORMIN (GLUCOPHAGE) 500 MG tablet Take 1 tablet (500 mg) by mouth daily (with dinner) 3/14/2019 at 1700 Yes Julius Hassan MD   metoprolol succinate (TOPROL-XL) 50 MG 24 hr tablet TAKE 1 TABLET BY MOUTH ONCE DAILY 3/16/2019 at 0800 Yes Julius Hassan MD   Multiple Vitamin (MULTI-VITAMIN PO) Take 1 tablet by mouth daily  3/16/2019 at 0800 Yes Reported, Patient   omega-3 fatty acids (FISH OIL) 1200 MG capsule Take 1 capsule by mouth daily. 3/16/2019 at 0800 Yes Reported, Patient   oxyCODONE IR (ROXICODONE) 15 MG tablet Take 0.5-1 tablets (7.5-15 mg) by mouth every 4 hours as needed for moderate to severe pain Limit 4 tablet(s) per day. 3/16/2019 at 0743 Yes Gabriela Rodas APRN CNP   polyethylene glycol (MIRALAX/GLYCOLAX) packet Take 17 g by mouth daily 3/16/2019 at 0800 Yes Unknown, Entered By History   POTASSIUM CHLORIDE ER PO Take 20 mEq by mouth daily 3/16/2019 at 0800 Yes Reported, Patient   senna-docusate (SENOKOT-S/PERICOLACE) 8.6-50 MG tablet Take 3 tablets by mouth 2 times daily 3/16/2019 at 0800 Yes Isaak Henao DO   baclofen (LIORESAL) 10 MG tablet Take 10 mg by mouth  daily as needed for muscle spasms Unknown at Unknown time  Unknown, Entered By History   bisacodyl (DULCOLAX) 10 MG suppository Place 1 suppository (10 mg) rectally daily as needed for constipation Unknown at Unknown time  Isaak Henao,    docusate sodium (COLACE) 100 MG capsule Take 100 mg by mouth 2 times daily as needed for constipation Unknown at Unknown time  Reported, Patient   polyethylene glycol (MIRALAX/GLYCOLAX) packet Take 1 packet by mouth 2 times daily as needed for constipation Unknown at Unknown time  Unknown, Entered By History          Review of Systems:   Unable to obtain due to patient's critical illness on the vent         Physical Exam:     Vital signs were reviewed    Temp:  [97.1  F (36.2  C)-101.3  F (38.5  C)] 101.3  F (38.5  C)  Pulse:  [] 84  Heart Rate:  [68-96] 88  Resp:  [12-15] 14  BP: ()/(50-86) 99/50  SpO2:  [91 %-97 %] 97 %    Patient is intubated and ventilated.  She is sedated as well.  He is comfortable with no distress.  No increased work of breathing  No neck mass or swelling  Respiratory exam showed decreased air entry and diffuse rhonchi.  No wheezing.  Abdomen is obese, no hepatomegaly or splenomegaly.  Lower extremity exam is evident for bilateral lower extremity chronic dermatosis and edema.  Patient is sedated on mechanical ventilator and unable to assess her neurologic and psychiatric assessment  Skin exam is evident for bilateral lower extremity chronic skin changes with erythema and discoloration         Data:       All laboratory and imaging data in the past 24 hours reviewed     Results for orders placed or performed during the hospital encounter of 03/16/19   Chest  XR, 1 view PORTABLE    Narrative    XR CHEST PORT 1 VW  3/16/2019 1:27 PM     HISTORY:  Resp. Distress    COMPARISON: Film dated 2/12/2019    FINDINGS:  ET tube and NG tube are in place. The heart is enlarged.  Mild basilar opacities consistent with a small amount of infiltrate  or  atelectasis.      Impression    IMPRESSION: ET tube and NG tube are in good position.    MURTAZA FREITAS MD   CBC + differential   Result Value Ref Range    WBC 38.5 (H) 4.0 - 11.0 10e9/L    RBC Count 3.70 (L) 3.8 - 5.2 10e12/L    Hemoglobin 9.8 (L) 11.7 - 15.7 g/dL    Hematocrit 32.4 (L) 35.0 - 47.0 %    MCV 88 78 - 100 fl    MCH 26.5 26.5 - 33.0 pg    MCHC 30.2 (L) 31.5 - 36.5 g/dL    RDW 17.5 (H) 10.0 - 15.0 %    Platelet Count 321 150 - 450 10e9/L    Diff Method Automated Method     % Neutrophils 91.4 %    % Lymphocytes 2.2 %    % Monocytes 4.5 %    % Eosinophils 0.0 %    % Basophils 0.3 %    % Immature Granulocytes 1.6 %    Nucleated RBCs 0 0 /100    Absolute Neutrophil 35.1 (H) 1.6 - 8.3 10e9/L    Absolute Lymphocytes 0.9 0.8 - 5.3 10e9/L    Absolute Monocytes 1.8 (H) 0.0 - 1.3 10e9/L    Absolute Eosinophils 0.0 0.0 - 0.7 10e9/L    Absolute Basophils 0.1 0.0 - 0.2 10e9/L    Abs Immature Granulocytes 0.6 (H) 0 - 0.4 10e9/L    Absolute Nucleated RBC 0.0    Comprehensive metabolic panel   Result Value Ref Range    Sodium 133 133 - 144 mmol/L    Potassium 4.8 3.4 - 5.3 mmol/L    Chloride 98 94 - 109 mmol/L    Carbon Dioxide 29 20 - 32 mmol/L    Anion Gap 6 3 - 14 mmol/L    Glucose 123 (H) 70 - 99 mg/dL    Urea Nitrogen 31 (H) 7 - 30 mg/dL    Creatinine 1.64 (H) 0.52 - 1.04 mg/dL    GFR Estimate 35 (L) >60 mL/min/[1.73_m2]    GFR Estimate If Black 40 (L) >60 mL/min/[1.73_m2]    Calcium 7.7 (L) 8.5 - 10.1 mg/dL    Bilirubin Total 0.7 0.2 - 1.3 mg/dL    Albumin 2.3 (L) 3.4 - 5.0 g/dL    Protein Total 7.3 6.8 - 8.8 g/dL    Alkaline Phosphatase 171 (H) 40 - 150 U/L    ALT 28 0 - 50 U/L    AST 31 0 - 45 U/L   Lactic acid whole blood   Result Value Ref Range    Lactic Acid 1.6 0.7 - 2.0 mmol/L   Troponin I   Result Value Ref Range    Troponin I ES 0.050 (H) 0.000 - 0.045 ug/L   Blood gas venous and oxyhgb   Result Value Ref Range    Ph Venous 7.26 (L) 7.32 - 7.43 pH    PCO2 Venous 70 (H) 40 - 50 mm Hg    PO2 Venous 61 (H)  25 - 47 mm Hg    Bicarbonate Venous 32 (H) 21 - 28 mmol/L    FIO2 15l NRB     Oxyhemoglobin Venous 86 %    Base Excess Venous 3.2 mmol/L   UA with Microscopic reflex to Culture   Result Value Ref Range    Color Urine Yellow     Appearance Urine Cloudy     Glucose Urine Negative NEG^Negative mg/dL    Bilirubin Urine Negative NEG^Negative    Ketones Urine Negative NEG^Negative mg/dL    Specific Gravity Urine 1.012 1.003 - 1.035    Blood Urine Large (A) NEG^Negative    pH Urine 5.0 5.0 - 7.0 pH    Protein Albumin Urine 100 (A) NEG^Negative mg/dL    Urobilinogen mg/dL 0.0 0.0 - 2.0 mg/dL    Nitrite Urine Positive (A) NEG^Negative    Leukocyte Esterase Urine Large (A) NEG^Negative    Source Catheterized Urine     WBC Urine 45 (H) 0 - 5 /HPF    RBC Urine 146 (H) 0 - 2 /HPF    WBC Clumps Present (A) NEG^Negative /HPF    Bacteria Urine Many (A) NEG^Negative /HPF    Squamous Epithelial /HPF Urine 1 0 - 1 /HPF    Mucous Urine Present (A) NEG^Negative /LPF    Amorphous Crystals Moderate (A) NEG^Negative /HPF   EKG 12 lead   Result Value Ref Range    Interpretation ECG Click View Image link to view waveform and result    ISTAT gases lactate france POCT   Result Value Ref Range    Ph Venous 7.27 (L) 7.32 - 7.43 pH    PCO2 Venous 68 (H) 40 - 50 mm Hg    PO2 Venous 63 (H) 25 - 47 mm Hg    Bicarbonate Venous 31 (H) 21 - 28 mmol/L    O2 Sat Venous 87 %    Lactic Acid 1.4 0.7 - 2.1 mmol/L   ISTAT gases lactate france POCT   Result Value Ref Range    Ph Venous 7.23 (L) 7.32 - 7.43 pH    PCO2 Venous 69 (H) 40 - 50 mm Hg    PO2 Venous 23 (L) 25 - 47 mm Hg    Bicarbonate Venous 29 (H) 21 - 28 mmol/L    O2 Sat Venous 30 %    Lactic Acid 1.8 0.7 - 2.1 mmol/L   Influenza A/B antigen   Result Value Ref Range    Influenza A/B Agn Specimen Nasopharyngeal     Influenza A Negative NEG^Negative    Influenza B Negative NEG^Negative          Recent Results (from the past 48 hour(s))   XR Surgery LUCILA Fluoro L/T 5 Min w Stills    Narrative    This exam  was marked as non-reportable because it will not be read by a   radiologist or a [facility] non-radiologist provider.             Chest  XR, 1 view PORTABLE    Narrative    XR CHEST PORT 1 VW  3/16/2019 1:27 PM     HISTORY:  Resp. Distress    COMPARISON: Film dated 2/12/2019    FINDINGS:  ET tube and NG tube are in place. The heart is enlarged.  Mild basilar opacities consistent with a small amount of infiltrate or  atelectasis.      Impression    IMPRESSION: ET tube and NG tube are in good position.    MURTAZA FREITAS MD       EKG results: Normal sinus rhythm, left axis deviation.  Left bundle branch block.  No ST segment elevation         All imaging studies reviewed by me.         Patient`s old medical records reviewed and case discussed with the ED physician.    ED course-Reviewed   Over an hour of critical care illness spent on this patient's care

## 2019-03-16 NOTE — CONSULTS
Urology Consult History and Physical    Name: Amanda Richardson    MRN: [id number]   YOB: 1965       We were asked to see Amanda Richardson at the request of Dr. Greco for evaluation and treatment of urosepsis.        Chief Complaint:   Urosepsis          History of Present Illness:   Amanda Richardson is a 53 year old female who is being seen for evaluation of urosepsis. Well known to urology service. Has history of multiple sclerosis and recurrent urinary tract infection. Has history of sepsis related to nephrolithiasis. She initially presented in January with urosepsis and underwent ureteral stent placement, which led to prolonged hospitalization. She subsequently had ureteroscopy and stone removal with Dr. Chauhan on 3/15/2019. Following procedure, she had decreased level of consciousness. She ultimately returned to ED and found to be hypoxic, hypotensive, with leukocytosis and fever. She ultimately required intubation in ED. Urology consulted given recent procedure and apparent urosepsis.    Leukocytosis to 38.5K and urine with marked suspicion for infection. Will be admitted to ICU given severe sepsis. Initially treated with vancomycin and Zosyn           Past Medical History:     Past Medical History:   Diagnosis Date     Abnormality of gait 7/27/2012     Arrhythmia     with sepsis     Chronic pain     [facility] - yearly, next in summer 2018     CKD (chronic kidney disease) stage 1, GFR 90 ml/min or greater     kidney stones     Colon polyps 1/15    tubular adenomas x 2     Gallstone 6/11/2012     Hyperlipidemia LDL goal <70      Hypertension goal BP (blood pressure) < 140/90      Leukocytosis 6/11/2012     Moderate depressive episode (H)      MS (multiple sclerosis) (H) 2003    Dr Vigil/Gaby - [facility]     Multiple sclerosis (H)      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 Cystoscopy 2. LEFT retrograde pyelogram 3. LEFT JJ stent placement 4. <1hr physician fluoroscopy time;  Surgeon: Epifanio Sapp MD;  Location: [facility]     FUSION LUMBAR ANTERIOR, FUSION LUMBAR POSTERIOR TWO LEVELS, COMBINED  10/17/2013    lumbar fusion - Dr Floyd     INSERT PUMP BACLOFEN  2017    intrathecal baclofen pump implantation     IRRIGATION AND DEBRIDEMENT LOWER EXTREMITY, COMBINED Right 2015    Right ankle I&D d/t infection     OPEN REDUCTION INTERNAL FIXATION ANKLE  5/15    Right Bimalleolar ankle fx ORIF     OPEN REDUCTION INTERNAL FIXATION ANKLE Right 2015    Revision due to spasms pulling screws out of ankle     SINUS SURGERY      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     XR LUMBAR EPIDURAL INJECTION INCL IMAGING  3/14    Left L4-5 Epidural Dr Winter          Social History:     Social History     Tobacco Use     Smoking status: Former Smoker     Packs/day: 0.50     Types: Cigarettes     Last attempt to quit: 2013     Years since quittin.6     Smokeless tobacco: Never Used     Tobacco comment: since age  17   Substance Use Topics     Alcohol use: No          Family History:     Family History   Problem Relation Age of Onset     C.A.D. Father         with CHF      Cancer Father         ? unsure type - abdominal      Hypertension Mother      Thyroid Disease Mother         goiter      Breast Cancer Sister 58     Thyroid Disease Son      Cancer - colorectal No family hx of           Allergies:     Allergies   Allergen Reactions     Lisinopril      Lip swelling     Cyclobenzaprine Other (See Comments)     Per 4-10-17 H&P by Yanna Dugan PA-C.     Flexeril [Cyclobenzaprine Hcl]      Got confused           Medications:     Current Facility-Administered Medications   Medication     glucose gel 15-30 g    Or     dextrose 50 % injection 25-50 mL    Or     glucagon injection 1 mg     heparin sodium injection 7,500 Units     HYDROmorphone (PF) (DILAUDID) injection 0.5-1 mg     insulin aspart (NovoLOG) inj (RAPID ACTING)     lactated ringers BOLUS 500 mL     lactated ringers infusion     lidocaine 1 % 0.5-5 mL     magnesium sulfate 4 g in 100 mL sterile water (premade)     melatonin tablet 1 mg     naloxone (NARCAN) injection 0.1-0.4 mg     norepinephrine (LEVOPHED) 16 mg in D5W 250 mL infusion     ondansetron (ZOFRAN-ODT) ODT tab 4 mg    Or     ondansetron (ZOFRAN) injection 4 mg     pantoprazole (PROTONIX) 40 mg IV push injection     piperacillin-tazobactam (ZOSYN) intermittent infusion 4.5 g     potassium chloride (KLOR-CON) Packet 20-40 mEq     potassium chloride 10 mEq in 100 mL intermittent infusion with 10 mg lidocaine     potassium chloride 10 mEq in 100 mL sterile water intermittent infusion (premix)     potassium chloride 20 mEq in 50 mL intermittent infusion     potassium chloride ER (K-DUR/KLOR-CON M) CR tablet 20-40 mEq     propofol (DIPRIVAN) infusion    And     propofol (DIPRIVAN) infusion 10-20 mg     sodium chloride (PF) 0.9% PF flush 10 mL     sodium chloride (PF) 0.9% PF flush 10-20 mL     sodium chloride  "0.9% infusion            Review of Systems:    ROS: 10 point ROS neg other than the symptoms noted above in the HPI.          Physical Exam:     Patient Vitals for the past 24 hrs:   BP Temp Temp src Pulse Heart Rate Resp SpO2 Height Weight   03/16/19 1800 112/63 99.3  F (37.4  C) -- 88 89 -- 95 % -- --   03/16/19 1700 (!) 89/50 99  F (37.2  C) -- -- 84 -- 94 % -- --   03/16/19 1645 95/50 99  F (37.2  C) -- 86 87 -- 92 % -- --   03/16/19 1630 100/52 99  F (37.2  C) -- 85 85 -- 91 % -- --   03/16/19 1615 101/51 99  F (37.2  C) -- 86 86 -- (!) 89 % -- --   03/16/19 1600 102/53 99  F (37.2  C) -- 85 84 20 97 % -- --   03/16/19 1553 -- -- -- -- -- -- 97 % -- --   03/16/19 1545 102/50 99  F (37.2  C) -- 86 85 -- 100 % -- --   03/16/19 1530 98/54 99  F (37.2  C) -- 85 85 -- 100 % 1.702 m (5' 7\") 119.6 kg (263 lb 10.7 oz)   03/16/19 1515 91/58 99.1  F (37.3  C) -- -- 85 20 90 % -- --   03/16/19 1400 105/54 -- -- 81 81 -- 94 % -- --   03/16/19 1346 101/52 -- -- 82 -- -- -- -- --   03/16/19 1345 99/53 -- -- 81 83 -- 97 % -- --   03/16/19 1332 94/52 -- -- 83 -- -- 96 % -- --   03/16/19 1330 99/50 -- -- 84 -- -- 97 % -- --   03/16/19 1315 104/51 -- -- 87 88 -- -- -- --   03/16/19 1300 128/67 -- -- 92 91 -- 95 % -- --   03/16/19 1252 118/63 -- -- 89 89 -- 91 % -- --   03/16/19 1205 -- 101.3  F (38.5  C) Oral -- -- -- -- -- --   03/16/19 1200 -- -- -- -- 96 -- -- -- --   03/16/19 1154 -- -- -- -- 92 -- 94 % -- --   03/16/19 1153 -- 97.1  F (36.2  C) Temporal -- 93 -- 95 % -- --   03/16/19 1151 111/55 -- -- 91 93 -- 94 % -- --   03/16/19 1150 -- -- -- -- 92 -- 97 % -- --   03/16/19 1149 -- -- -- -- 93 -- 94 % -- --   03/16/19 1147 (!) 81/52 -- -- 92 -- -- 96 % -- --     General: age-appropriate appearing female; intubated and sedated  HEENT: ET tube in place  Lungs: mechanical ventilation  Heart: No obvious jugular venous distension present  Back: no bony midline tenderness, no CVAT bilaterally.  Abdomen: soft, non-distended, " non-tender. No organomegaly  LE: no edema. pneumoboots in place.  Musculoskeltal: extremities normal, no peripheral edema  Skin: no suspicious lesions or rashes  Neuro: sedated  Psych: intubated and sedated          Data:   All laboratory data reviewed:    Recent Labs   Lab 03/16/19  1200 03/11/19   WBC 38.5* 13.2*   HGB 9.8* 10.1*    346     Recent Labs   Lab 03/16/19  1200 03/11/19    140   POTASSIUM 4.8 3.6   CHLORIDE 98 99   CO2 29 27   BUN 31* 20   CR 1.64* 0.68   * 82   MARY 7.7* 8.8     Recent Labs   Lab 03/16/19  1221   COLOR Yellow   APPEARANCE Cloudy   URINEGLC Negative   URINEBILI Negative   URINEKETONE Negative   SG 1.012   URINEPH 5.0   PROTEIN 100*   NITRITE Positive*   LEUKEST Large*   RBCU 146*   WBCU 45*     All pertinent imaging reviewed:    CT abd/pelvis 3/16/2019  IMPRESSION:  1. Left ureter stent in place. No ureter stone is seen. No convincing  hydronephrosis. A few small stone fragments within each kidney.  2. Fracture through the proximal right femur with increased impaction  noted. Degenerative joint disease appears severe at the right hip and  mild at the left hip.  3. Cholelithiasis.  4. Bibasilar areas of consolidation and atelectasis. This appears  increased at the right base. Cannot exclude multifocal pneumonia.          Impression and Plan:   Impression:   53 year old female with apparent urosepsis, POD 1 s/p left ureteroscopy and stone treatment with Dr. Chauhan. Stent appears in good position and urine is currently clearing with Bobo in place. No indications for further urologic intervention at this time. Agree with Bobo for maximal urinary decompression. Broad spectrum antibiotics.      Plan:   - Broad spectrum empiric antibiotics  - Awaiting urine and blood cultures  - Appreciate ICU care  - Urology will follow     Nicolas Amin MD  Urology  [facility]  Clinic Phone [phone]

## 2019-03-17 ENCOUNTER — APPOINTMENT (OUTPATIENT)
Dept: GENERAL RADIOLOGY | Facility: CLINIC | Age: 54
DRG: 856 | End: 2019-03-17
Attending: INTERNAL MEDICINE
Payer: COMMERCIAL

## 2019-03-17 LAB
ALBUMIN SERPL-MCNC: 2.1 G/DL (ref 3.4–5)
ALP SERPL-CCNC: 170 U/L (ref 40–150)
ALT SERPL W P-5'-P-CCNC: 29 U/L (ref 0–50)
ANION GAP SERPL CALCULATED.3IONS-SCNC: 3 MMOL/L (ref 3–14)
AST SERPL W P-5'-P-CCNC: 36 U/L (ref 0–45)
BILIRUB SERPL-MCNC: 0.9 MG/DL (ref 0.2–1.3)
BUN SERPL-MCNC: 22 MG/DL (ref 7–30)
CALCIUM SERPL-MCNC: 7.7 MG/DL (ref 8.5–10.1)
CHLORIDE SERPL-SCNC: 104 MMOL/L (ref 94–109)
CO2 SERPL-SCNC: 32 MMOL/L (ref 20–32)
CREAT SERPL-MCNC: 0.96 MG/DL (ref 0.52–1.04)
ERYTHROCYTE [DISTWIDTH] IN BLOOD BY AUTOMATED COUNT: 17.8 % (ref 10–15)
GFR SERPL CREATININE-BSD FRML MDRD: 66 ML/MIN/{1.73_M2}
GLUCOSE BLDC GLUCOMTR-MCNC: 101 MG/DL (ref 70–99)
GLUCOSE BLDC GLUCOMTR-MCNC: 104 MG/DL (ref 70–99)
GLUCOSE BLDC GLUCOMTR-MCNC: 104 MG/DL (ref 70–99)
GLUCOSE BLDC GLUCOMTR-MCNC: 122 MG/DL (ref 70–99)
GLUCOSE BLDC GLUCOMTR-MCNC: 138 MG/DL (ref 70–99)
GLUCOSE BLDC GLUCOMTR-MCNC: 94 MG/DL (ref 70–99)
GLUCOSE SERPL-MCNC: 120 MG/DL (ref 70–99)
HBA1C MFR BLD: 6.3 % (ref 0–5.6)
HCT VFR BLD AUTO: 31 % (ref 35–47)
HGB BLD-MCNC: 9.4 G/DL (ref 11.7–15.7)
INTERPRETATION ECG - MUSE: NORMAL
MCH RBC QN AUTO: 26.5 PG (ref 26.5–33)
MCHC RBC AUTO-ENTMCNC: 30.3 G/DL (ref 31.5–36.5)
MCV RBC AUTO: 87 FL (ref 78–100)
PLATELET # BLD AUTO: 273 10E9/L (ref 150–450)
POTASSIUM SERPL-SCNC: 3.4 MMOL/L (ref 3.4–5.3)
PROT SERPL-MCNC: 6.9 G/DL (ref 6.8–8.8)
RBC # BLD AUTO: 3.55 10E12/L (ref 3.8–5.2)
SODIUM SERPL-SCNC: 139 MMOL/L (ref 133–144)
WBC # BLD AUTO: 31.1 10E9/L (ref 4–11)

## 2019-03-17 PROCEDURE — 83036 HEMOGLOBIN GLYCOSYLATED A1C: CPT | Performed by: INTERNAL MEDICINE

## 2019-03-17 PROCEDURE — 25800030 ZZH RX IP 258 OP 636: Performed by: INTERNAL MEDICINE

## 2019-03-17 PROCEDURE — 25000128 H RX IP 250 OP 636: Performed by: SURGERY

## 2019-03-17 PROCEDURE — 40000275 ZZH STATISTIC RCP TIME EA 10 MIN

## 2019-03-17 PROCEDURE — 94003 VENT MGMT INPAT SUBQ DAY: CPT

## 2019-03-17 PROCEDURE — 25000132 ZZH RX MED GY IP 250 OP 250 PS 637: Performed by: INTERNAL MEDICINE

## 2019-03-17 PROCEDURE — 00000146 ZZHCL STATISTIC GLUCOSE BY METER IP

## 2019-03-17 PROCEDURE — 20000003 ZZH R&B ICU

## 2019-03-17 PROCEDURE — 71045 X-RAY EXAM CHEST 1 VIEW: CPT

## 2019-03-17 PROCEDURE — C9113 INJ PANTOPRAZOLE SODIUM, VIA: HCPCS | Performed by: INTERNAL MEDICINE

## 2019-03-17 PROCEDURE — 80053 COMPREHEN METABOLIC PANEL: CPT | Performed by: INTERNAL MEDICINE

## 2019-03-17 PROCEDURE — 99291 CRITICAL CARE FIRST HOUR: CPT | Performed by: SURGERY

## 2019-03-17 PROCEDURE — 85027 COMPLETE CBC AUTOMATED: CPT | Performed by: INTERNAL MEDICINE

## 2019-03-17 PROCEDURE — 99233 SBSQ HOSP IP/OBS HIGH 50: CPT | Performed by: INTERNAL MEDICINE

## 2019-03-17 PROCEDURE — 25000128 H RX IP 250 OP 636: Performed by: INTERNAL MEDICINE

## 2019-03-17 RX ORDER — BISACODYL 10 MG
10 SUPPOSITORY, RECTAL RECTAL DAILY PRN
Status: DISCONTINUED | OUTPATIENT
Start: 2019-03-17 | End: 2019-03-26 | Stop reason: HOSPADM

## 2019-03-17 RX ORDER — BACLOFEN 10 MG/1
10 TABLET ORAL DAILY PRN
Status: DISCONTINUED | OUTPATIENT
Start: 2019-03-17 | End: 2019-03-26 | Stop reason: HOSPADM

## 2019-03-17 RX ORDER — DOCUSATE SODIUM 100 MG/1
100 CAPSULE, LIQUID FILLED ORAL 2 TIMES DAILY PRN
Status: DISCONTINUED | OUTPATIENT
Start: 2019-03-17 | End: 2019-03-26 | Stop reason: HOSPADM

## 2019-03-17 RX ADMIN — VITAMIN D, TAB 1000IU (100/BT) 1000 UNITS: 25 TAB at 14:44

## 2019-03-17 RX ADMIN — ONDANSETRON 4 MG: 2 INJECTION INTRAMUSCULAR; INTRAVENOUS at 22:09

## 2019-03-17 RX ADMIN — TAZOBACTAM SODIUM AND PIPERACILLIN SODIUM 4.5 G: 500; 4 INJECTION, SOLUTION INTRAVENOUS at 02:26

## 2019-03-17 RX ADMIN — HEPARIN SODIUM 7500 UNITS: 5000 INJECTION, SOLUTION INTRAVENOUS; SUBCUTANEOUS at 20:11

## 2019-03-17 RX ADMIN — PANTOPRAZOLE SODIUM 40 MG: 40 INJECTION, POWDER, FOR SOLUTION INTRAVENOUS at 08:34

## 2019-03-17 RX ADMIN — DOCUSATE SODIUM 100 MG: 100 CAPSULE, LIQUID FILLED ORAL at 22:06

## 2019-03-17 RX ADMIN — ACETAMINOPHEN 650 MG: 325 SOLUTION ORAL at 20:19

## 2019-03-17 RX ADMIN — PROPOFOL 45 MCG/KG/MIN: 10 INJECTION, EMULSION INTRAVENOUS at 04:29

## 2019-03-17 RX ADMIN — PROPOFOL 45 MCG/KG/MIN: 10 INJECTION, EMULSION INTRAVENOUS at 07:23

## 2019-03-17 RX ADMIN — PROPOFOL 10 MG: 10 INJECTION, EMULSION INTRAVENOUS at 03:15

## 2019-03-17 RX ADMIN — HYDROMORPHONE HYDROCHLORIDE 0.5 MG: 1 INJECTION, SOLUTION INTRAMUSCULAR; INTRAVENOUS; SUBCUTANEOUS at 22:04

## 2019-03-17 RX ADMIN — PROPOFOL 40 MCG/KG/MIN: 10 INJECTION, EMULSION INTRAVENOUS at 00:06

## 2019-03-17 RX ADMIN — TAZOBACTAM SODIUM AND PIPERACILLIN SODIUM 4.5 G: 500; 4 INJECTION, SOLUTION INTRAVENOUS at 08:25

## 2019-03-17 RX ADMIN — SODIUM CHLORIDE, POTASSIUM CHLORIDE, SODIUM LACTATE AND CALCIUM CHLORIDE: 600; 310; 30; 20 INJECTION, SOLUTION INTRAVENOUS at 18:43

## 2019-03-17 RX ADMIN — ACETAMINOPHEN 650 MG: 325 SOLUTION ORAL at 08:29

## 2019-03-17 RX ADMIN — SODIUM CHLORIDE, POTASSIUM CHLORIDE, SODIUM LACTATE AND CALCIUM CHLORIDE: 600; 310; 30; 20 INJECTION, SOLUTION INTRAVENOUS at 12:17

## 2019-03-17 RX ADMIN — HEPARIN SODIUM 7500 UNITS: 5000 INJECTION, SOLUTION INTRAVENOUS; SUBCUTANEOUS at 08:32

## 2019-03-17 RX ADMIN — HYDROMORPHONE HYDROCHLORIDE 0.5 MG: 1 INJECTION, SOLUTION INTRAMUSCULAR; INTRAVENOUS; SUBCUTANEOUS at 15:10

## 2019-03-17 RX ADMIN — PROPOFOL 45 MCG/KG/MIN: 10 INJECTION, EMULSION INTRAVENOUS at 10:25

## 2019-03-17 RX ADMIN — HYDROMORPHONE HYDROCHLORIDE 0.5 MG: 1 INJECTION, SOLUTION INTRAMUSCULAR; INTRAVENOUS; SUBCUTANEOUS at 11:51

## 2019-03-17 RX ADMIN — TAZOBACTAM SODIUM AND PIPERACILLIN SODIUM 4.5 G: 500; 4 INJECTION, SOLUTION INTRAVENOUS at 20:11

## 2019-03-17 RX ADMIN — ACETAMINOPHEN 650 MG: 325 SOLUTION ORAL at 15:09

## 2019-03-17 RX ADMIN — HYDROMORPHONE HYDROCHLORIDE 0.5 MG: 1 INJECTION, SOLUTION INTRAMUSCULAR; INTRAVENOUS; SUBCUTANEOUS at 08:30

## 2019-03-17 RX ADMIN — BACLOFEN 10 MG: 10 TABLET ORAL at 14:44

## 2019-03-17 RX ADMIN — HYDROMORPHONE HYDROCHLORIDE 0.5 MG: 1 INJECTION, SOLUTION INTRAMUSCULAR; INTRAVENOUS; SUBCUTANEOUS at 03:14

## 2019-03-17 RX ADMIN — TAZOBACTAM SODIUM AND PIPERACILLIN SODIUM 4.5 G: 500; 4 INJECTION, SOLUTION INTRAVENOUS at 13:27

## 2019-03-17 ASSESSMENT — MIFFLIN-ST. JEOR: SCORE: 1851.63

## 2019-03-17 ASSESSMENT — ACTIVITIES OF DAILY LIVING (ADL)
ADLS_ACUITY_SCORE: 31

## 2019-03-17 NOTE — PROGRESS NOTES
[facility]  Hospitalist Progress Note  Marco Kang MD  03/17/2019    Assessment & Plan   :Amanda Richardson is a 53 year old  female with a significant past medical history of complex medical problems with frequent hospitalization discharged last month on February 15, 2019 after 2 weeks stay in the hospital for septic shock, respiratory failure who currently presents from nursing home with decreased level of consciousness.  Patient had a urologic procedure on March 15, 2019.  She had cystoscopy, left ureteroscopy with holmium laser lithotripsy and left ureteral stent exchange by Dr. Mayito Chauhan under general anesthesia at [facility] outpatient surgery.  Patient remained lethargic since the procedure was done and had a hard time getting up to a wheelchair to eat.  Emergency medical team were called and patient was found to have hypoxia with oxygen saturation 72% room air and patient was placed on oxygen and was taken to the emergency department for evaluation.  The emergency department patient was stabilized and required emergency intubation and further evaluations done.  On arrival to the emergency department patient's blood pressure was 81/52, temperature was 101.3 and mental status was depressed.  Further studies showed evidence of elevated creatinine at 1.64 from her normal baseline, slightly elevated troponin at 0.050, significant leukocytosis with WBC count of 38.5 with left shift.        Admission diagnoses:      #1. Severe sepsis due to ureteral stone with lithotripsy and stent exchange.  -- needed low dose levophed to maintain blood pressure  -- blood and ucx negative to date, continue broad spectrum abx with vanco and zosyn.  -- wbc improving 38K >> 31K  -- creatinine and LA improving.    2. Acute respiratory failure due to severe sepsis, metabolic encephalopathy.  -- ETT, on low dose levophed and propofol drip  -- restraints signed  -- critical care  "following, weaning trails as per service and vent settings.  -- minimal secretions, cxr shows no abnormality.       3. Acute metabolic encephalopathy: Likely septic encephalopathy.    -- Suspect anesthesia,  narcotics medications in sepsis.  -- on propofol and sedated     4. JULIANNE  -- creatinine improved 1.6 < 0.96  -- good urine output     5. Hx of MS, chronic pain  -- holding baclofen, narcotics, neurontin    6. HTN  -- all bp meds(toprol, hydrochlorothiazide, cozaar)  on hold due to sepsis and hypotension.    7. DM II  -- BG control is good  -- A1c 6.5  -- metformin on hold  -- anticipate extubation soon     8. DVT prophylaxis  -- PCD    9. Code status  -- Full                 Code Status/Disposition:             Interval History   -- chart reviewed and discussed with RN  -- overnight needed low dose levophed to maintain bp  -- good urine output  -- wbc and creatinine improving    -Data reviewed today: I reviewed all new labs and imaging over the last 24 hours. I personally reviewed no images or EKG's today.    Physical Exam   Heart Rate: 97, Blood pressure (!) 85/39, pulse 97, temperature 99  F (37.2  C), resp. rate 14, height 1.702 m (5' 7\"), weight 122.4 kg (269 lb 13.5 oz), last menstrual period 12/11/2011, SpO2 98 %, not currently breastfeeding.  Vitals:    03/16/19 1530 03/17/19 0300   Weight: 119.6 kg (263 lb 10.7 oz) 122.4 kg (269 lb 13.5 oz)     Vital Signs with Ranges  Temp:  [93.7  F (34.3  C)-102  F (38.9  C)] 99  F (37.2  C)  Pulse:  [] 97  Heart Rate:  [] 97  Resp:  [9-23] 14  BP: ()/(36-75) 85/39  FiO2 (%):  [35 %-40 %] 40 %  SpO2:  [89 %-100 %] 98 %  I/O's Last 24 hours  I/O last 3 completed shifts:  In: 5534.31 [I.V.:3494.31; NG/GT:40; IV Piggyback:2000]  Out: 4025 [Urine:4025]    Constitutional: ETT, diaphoresis, sedated  Respiratory: diminised bilaterally at the bases, no crackles or wheezing  Cardiovascular: Regular rate and rhythm, normal S1 and S2, and no murmur noted  GI: " Normal bowel sounds, soft, non-distended, non-tender  Skin/Integumen: No rashes, no cyanosis, no edema  Other:      Medications   All medications were reviewed.    lactated ringers 150 mL/hr at 03/17/19 0827     norepinephrine 0.06 mcg/kg/min (03/17/19 0933)     propofol (DIPRIVAN) infusion 45 mcg/kg/min (03/17/19 0827)     sodium chloride 10 mL/hr at 03/17/19 0900       heparin  7,500 Units Subcutaneous Q12H     insulin aspart  1-6 Units Subcutaneous Q4H     pantoprazole (PROTONIX) IV  40 mg Intravenous Daily     piperacillin-tazobactam  4.5 g Intravenous Q6H     sodium chloride (PF)  10 mL Intracatheter Q7 Days        Data   Recent Labs   Lab 03/17/19  0615 03/16/19  1725 03/16/19  1200 03/11/19   WBC 31.1*  --  38.5* 13.2*   HGB 9.4*  --  9.8* 10.1*   MCV 87  --  88 87     --  321 346     --  133 140   POTASSIUM 3.4  --  4.8 3.6   CHLORIDE 104  --  98 99   CO2 32  --  29 27   BUN 22  --  31* 20   CR 0.96  --  1.64* 0.68   ANIONGAP 3  --  6 14   MARY 7.7*  --  7.7* 8.8   *  --  123* 82   ALBUMIN 2.1*  --  2.3*  --    PROTTOTAL 6.9  --  7.3  --    BILITOTAL 0.9  --  0.7  --    ALKPHOS 170*  --  171*  --    ALT 29  --  28  --    AST 36  --  31  --    TROPI  --  0.061* 0.050*  --        Recent Results (from the past 24 hour(s))   Chest  XR, 1 view PORTABLE    Narrative    XR CHEST PORT 1 VW  3/16/2019 1:27 PM     HISTORY:  Resp. Distress    COMPARISON: Film dated 2/12/2019    FINDINGS:  ET tube and NG tube are in place. The heart is enlarged.  Mild basilar opacities consistent with a small amount of infiltrate or  atelectasis.      Impression    IMPRESSION: ET tube and NG tube are in good position.    MURTAZA FREITAS MD   Head CT w/o contrast    Narrative    CT SCAN OF THE HEAD WITHOUT CONTRAST   3/16/2019 2:31 PM     HISTORY: Altered level of consciousness (LOC), unexplained    TECHNIQUE:  Axial images of the head and coronal reformations without  IV contrast material. Radiation dose for this scan was  reduced using  automated exposure control, adjustment of the mA and/or kV according  to patient size, or iterative reconstruction technique.    COMPARISON: CT dated 1/30/2018    FINDINGS:     Intracranial contents: Patient motion degrades the quality of some of  these images. The ventricles are normal in size, shape and  configuration.  The brain parenchyma and subarachnoid spaces are  normal. There is no evidence of intracranial hemorrhage, mass, acute  infarct or anomaly.    Visualized orbits/sinuses/mastoids:  The visualized portions of the  sinuses and mastoids appear normal.    Osseous structures/soft tissues:  There is no evidence of trauma.      Impression    IMPRESSION: No acute pathology. No bleed, mass, or acute infarcts are  seen. No change.      MURTAZA FREITAS MD   Abd/pelvis CT no contrast - Stone Protocol    Narrative    CT ABDOMEN AND PELVIS WITHOUT CONTRAST   3/16/2019 2:32 PM     HISTORY:  Stent placement yesterday, sepsis, altered mental status.  Evaluate stent patency, hydronephrosis, pyelonephritis.    TECHNIQUE: Noncontrast CT abdomen and pelvis was performed. Radiation  dose for this scan was reduced using automated exposure control,  adjustment of the mA and/or kV according to patient size, or iterative  reconstruction technique.    COMPARISON: CT abdomen and pelvis 1/30/2019.    FINDINGS:  Bilateral atelectasis versus airspace disease at the lung  bases. This appears increased on the right side and stable on the left  side.    Left ureter stent in place. The previously noted ureter stones are not  able to be identified currently. No significant hydronephrosis  identified. A few small intrarenal stones on the left are noted. An  example at the posterior lower left kidney is 0.7 cm, series 2 image  45. These are more numerous but appear thin in morphology compared to  the prior study where there was a single 0.8 cm more rounded stone at  the lower left kidney.    Right kidney shows no  hydronephrosis. 0.1 cm faint stone lower right  kidney is suggested, series 3 image 105. This is difficult to  correlate with on the prior exam. No acute right renal abnormality. No  right ureter stone is seen.    Bladder is decompressed by a catheter. Bubbles of gas in the bladder.  No acute bowel abnormalities. No bowel obstruction. Cholelithiasis  demonstrated. The unenhanced liver, spleen, adrenals, and pancreas  appear stable. Thickening of the left adrenal indeterminate but stable  measuring 2.5 cm, image 28. There is atrophy of the pancreas  diffusely. Severe degenerative change of the right hip. Mild DJD of  the left hip. Fracture through the proximal right femur again  identified with increased impaction of the fracture fragments, coronal  series 3 image 105. Surrounding edema. Spine surgical change at L4  through S1.      Impression    IMPRESSION:  1. Left ureter stent in place. No ureter stone is seen. No convincing  hydronephrosis. A few small stone fragments within each kidney.  2. Fracture through the proximal right femur with increased impaction  noted. Degenerative joint disease appears severe at the right hip and  mild at the left hip.  3. Cholelithiasis.  4. Bibasilar areas of consolidation and atelectasis. This appears  increased at the right base. Cannot exclude multifocal pneumonia.    POPPY SALINAS MD   XR Chest Port 1 View    Narrative    XR CHEST PORTABLE 1 VIEW   3/17/2019 6:11 AM     HISTORY: Patient in septic shock.    COMPARISON: 3/16/2019.    FINDINGS: Upright portable chest. Endotracheal tube in the mid  trachea. Nasogastric tube passes into the stomach. Right PICC is in  place. There is no pneumothorax. The heart is enlarged. There is no  pulmonary edema. Mild left basilar probable atelectasis without  significant change.      Impression    IMPRESSION: No acute abnormality.    MD Marco MEADOWS MD  Text Page  (7am to 6pm)

## 2019-03-17 NOTE — PROGRESS NOTES
TELE HUB:  Notified of fever.  Known source (urosepsis/stone extraction), already cultured.  Tylenol ordered q4 prn--no hx of liver problems.  Also reordered restraints which had .

## 2019-03-17 NOTE — PLAN OF CARE
ICU End of Shift Summary.  For vital signs and complete assessments, please see documentation flowsheets.     Pertinent assessments: Patient remains intubated and sedated with propofol. RASS 0 to -1; damon snot follow commands. Tele SR-STach. Tmax 102.2; PRN acetaminophen ordered and given x1; ice packs applied. Hypotensive at times; levophed started and titrated to keep MAP> 65. Good UOP per Bobo; sediment, yellow in color. No BM this shift. LS dim/coarse.   Major Shift Events: Levophed started. PRN Dilaudid for pain; pt grimaces when turned.  Plan (Upcoming Events): Trial vent wean today   Discharge/Transfer Needs: Return to TCU when stable    Bedside Shift Report Completed :   Bedside Safety Check Completed:

## 2019-03-17 NOTE — PLAN OF CARE
ICU End of Shift Summary.  For vital signs and complete assessments, please see documentation flowsheets.     Pertinent assessments: Patient alert and awake, able to follow commands. Good urine output. Up in chair with ceiling lift.  Major Shift Events: Patient extubated today. Pressors  off.  Plan (Upcoming Events): continue  to monitor. ID to follow  Discharge/Transfer Needs: to be determined    Bedside Shift Report Completed : yes  Bedside Safety Check Completed:yes

## 2019-03-17 NOTE — PROGRESS NOTES
ICU Staff:    I examined the patient in the ICU. Thew patient's  and son were at the bedside.  I reviewed the patient's medical records, progress notes, and imaging studies. The patient is a 53 year old woman admitted to the ICU for septic shock most likely related to a left ureteral stone.  The patient had a urologic procedure on March 15, 2019. The patient has a left ureteral stone and underwent left ureteroscopy with holmium laser lithotripsy and left ureteral stent exchange by Dr. Mayito Chauhan under general anesthesia at [facility] as an outpatient.  The patient did well on her PS trial; she demonstrated excellent TVs and FVCs. Her RR on the PS trial was 12/min.with tidal volumes in the 600s and 700s. The patient has now extubated and may need intermittent CPAP; she continues to demonstrate a significant leukocytosis (WBC count of 31 K today).  Agree with ID consultation as the WBC remains so high and the patient has a  indwelling intrathecal baclofen therapy pump.    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 Cystoscopy 2. LEFT retrograde pyelogram 3. LEFT JJ stent placement 4. <1hr physician fluoroscopy time;  Surgeon: Epifanio Sapp MD;  Location: [facility] OR     FUSION LUMBAR ANTERIOR, FUSION LUMBAR POSTERIOR TWO LEVELS, COMBINED  10/17/2013    lumbar fusion - Dr Floyd     INSERT PUMP BACLOFEN  04/2017    intrathecal baclofen pump implantation     IRRIGATION AND DEBRIDEMENT LOWER EXTREMITY, COMBINED Right 2015    Right ankle I&D d/t infection     OPEN REDUCTION INTERNAL FIXATION ANKLE  5/15    Right Bimalleolar ankle fx ORIF     OPEN REDUCTION INTERNAL FIXATION ANKLE Right 11/2015    Revision due to spasms pulling screws out of ankle     SINUS SURGERY  2011    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     XR LUMBAR EPIDURAL INJECTION INCL IMAGING  3/14    Left L4-5 Epidural Dr Winter        MEDICATIONS:  Current Facility-Administered Medications   Medication     acetaminophen (TYLENOL) solution 650 mg     baclofen (LIORESAL) tablet 10 mg     bisacodyl (DULCOLAX) Suppository 10 mg     glucose gel 15-30 g    Or     dextrose 50 % injection 25-50 mL    Or     glucagon injection 1 mg     docusate sodium (COLACE) capsule 100 mg     heparin sodium injection 7,500 Units     HYDROmorphone (PF) (DILAUDID) injection 0.5-1 mg     insulin aspart (NovoLOG) inj  (RAPID ACTING)     lactated ringers BOLUS 500 mL     lactated ringers infusion     lidocaine 1 % 0.5-5 mL     magnesium sulfate 4 g in 100 mL sterile water (premade)     melatonin tablet 1 mg     naloxone (NARCAN) injection 0.1-0.4 mg     norepinephrine (LEVOPHED) 16 mg in D5W 250 mL infusion     ondansetron (ZOFRAN-ODT) ODT tab 4 mg    Or     ondansetron (ZOFRAN) injection 4 mg     pantoprazole (PROTONIX) 40 mg IV push injection     piperacillin-tazobactam (ZOSYN) intermittent infusion 4.5 g     potassium chloride (KLOR-CON) Packet 20-40 mEq     potassium chloride 10 mEq in 100 mL intermittent infusion with 10 mg lidocaine     potassium chloride 10 mEq in 100 mL sterile water intermittent infusion (premix)     potassium chloride 20 mEq in 50 mL intermittent infusion     potassium chloride ER (K-DUR/KLOR-CON M) CR tablet 20-40 mEq     propofol (DIPRIVAN) infusion    And     propofol (DIPRIVAN) infusion 10-20 mg     sodium chloride (PF) 0.9% PF flush 10 mL     sodium chloride (PF) 0.9% PF flush 10-20 mL     sodium chloride 0.9% infusion     vitamin D3 (CHOLECALCIFEROL) 1000 units (25 mcg) tablet 1,000 Units        ALLERGIES:  Allergies   Allergen Reactions     Lisinopril      Lip swelling     Cyclobenzaprine Other (See Comments)     Per 4-10-17 H&P by Yanna Dugan PA-C.     Flexeril [Cyclobenzaprine Hcl]      Got confused         SOCIAL HISTORY:  Social History     Socioeconomic History     Marital status:      Spouse name: Fernando     Number of children: 3     Years of education: 14     Highest education level: Not on file   Occupational History     Employer: UNEMPLOYED   Social Needs     Financial resource strain: Not on file     Food insecurity:     Worry: Not on file     Inability: Not on file     Transportation needs:     Medical: Not on file     Non-medical: Not on file   Tobacco Use     Smoking status: Former Smoker     Packs/day: 0.50     Types: Cigarettes     Last attempt to quit: 8/4/2013     Years  "since quittin.6     Smokeless tobacco: Never Used     Tobacco comment: since age 17   Substance and Sexual Activity     Alcohol use: No     Drug use: No     Sexual activity: Yes     Partners: Male   Lifestyle     Physical activity:     Days per week: Not on file     Minutes per session: Not on file     Stress: Not on file   Relationships     Social connections:     Talks on phone: Not on file     Gets together: Not on file     Attends Confucianism service: Not on file     Active member of club or organization: Not on file     Attends meetings of clubs or organizations: Not on file     Relationship status: Not on file     Intimate partner violence:     Fear of current or ex partner: Not on file     Emotionally abused: Not on file     Physically abused: Not on file     Forced sexual activity: Not on file   Other Topics Concern     Parent/sibling w/ CABG, MI or angioplasty before 65F 55M? No      Service Not Asked     Blood Transfusions Not Asked     Caffeine Concern Yes     Comment: occas     Occupational Exposure Not Asked     Hobby Hazards Not Asked     Sleep Concern Not Asked     Stress Concern Not Asked     Weight Concern Not Asked     Special Diet Not Asked     Back Care Not Asked     Exercise Yes     Comment: as able     Bike Helmet Not Asked     Seat Belt Yes     Self-Exams Not Asked   Social History Narrative     Not on file       FAMILY HISTORY:  Family History   Problem Relation Age of Onset     C.A.D. Father         with CHF      Cancer Father         ? unsure type - abdominal      Hypertension Mother      Thyroid Disease Mother         goiter      Breast Cancer Sister 58     Thyroid Disease Son      Cancer - colorectal No family hx of         PHYSICAL EXAM:  Vital Signs: /64   Pulse 88   Temp 98  F (36.7  C)   Resp 16   Ht 1.702 m (5' 7\")   Wt 122.4 kg (269 lb 13.5 oz)   LMP 2011   SpO2 99%   BMI 42.26 kg/m      GEN: the patient has extubated and she appears comfortable on NC " supplemental O2.     HEENT:  No massed or swelling     Chest: CTA bilaterally     Cor: RRR, no murmur.     ABD: soft, no masses, no organomegaly, no hernias. RUQ baclofen therapy pump.     Ext: lower extremity pigment deposition and edema bilaterally. Right upper extremity PICC line in place.       Neuro: Patient is conversational and follows all commands.       A/P:The patient is a 53 year old woman admitted to the ICU for septic shock most likely related to a left ureteral stone.  The patient had a urologic procedure on March 15, 2019. The patient has a left ureteral stone and underwent left ureteroscopy with holmium laser lithotripsy and left ureteral stent exchange by Dr. Mayito Chauhan under general anesthesia at [facility] as an outpatient.  The patient did well on her PS trial; she demonstrated excellent TVs and FVCs. Her RR on the PS trial was 12/min.with tidal volumes in the 600s and 700s. The patient has now extubated and may need intermittent CPAP; she continues to demonstrate a significant leukocytosis (WBC count of 31 K today).  Agree with ID consultation as the WBC remains so high and the patient has a  indwelling intrathecal baclofen therapy pump.     Neuro: septic encephalopathy is markedly improved.       Cardiac: no acute issues.       Pulm:The patient has now extubated and may need intermittent CPAP; she continues to demonstrate a significant leukocytosis (     GI: no acute issues.     : JULIANNE due to shock and left ureteral stone; Urology input appreciated.     HEME: WBC count of 31 K today.  Will consult ID and follow the WBC.       ENDO: Type II DM, uses metformin as an outpatient.      ID: Severe sepsis with altered mental status, fever, leukocytosis (WBC count of 31 K today).  Will consult ID.       CODE: Full Code     Prophylactic measures:     DVT prevention     The patient is critically ill due to septic shock. The patient remains critically ill due to septic shock with a  WBC of 31K today. 35 min of Critical Care time exclusive of any time required to perform bedside procedures.  The critical care time was required to  examine the patient, to review the patient's medical records, and to coordinate the patient's ICU care.     Fransisco Martinez M.D., Ph.D.

## 2019-03-17 NOTE — PROGRESS NOTES
Respiratory Therapy Note    Patient extubated per MD order at 1244 to nasal cannula 4 LPM, SpO2 94%. Respiratory rate 16, unlabored. Patient encouraged to cough post extubation. RT will continue to monitor.    Linette Heck  12:44 PM March 17, 2019

## 2019-03-17 NOTE — PROGRESS NOTES
Infectious Diseases Consultation  March 17, 2019  Amanda Richardson MRN# [id number]   Age: 53 year old YOB: 1965   Date of Admission: 3/16/2019     Reason for consult: I was asked to see this patient for evaluation of septic shock, P.aeruginosa UTI          Requesting physician Pearl              Past Medical History:  Past Medical History:   Diagnosis Date     Abnormality of gait 7/27/2012     Arrhythmia     with sepsis     Chronic pain     [facility] - yearly, next in summer 2018     CKD (chronic kidney disease) stage 1, GFR 90 ml/min or greater     kidney stones     Colon polyps 1/15    tubular adenomas x 2     Gallstone 6/11/2012     Hyperlipidemia LDL goal <70      Hypertension goal BP (blood pressure) < 140/90      Leukocytosis 6/11/2012     Moderate depressive episode (H)      MS (multiple sclerosis) (H) 2003    Dr Vigil/Gaby - [facility]     Multiple sclerosis (H)      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 Cystoscopy 2. LEFT  retrograde pyelogram 3. LEFT JJ stent placement 4. <1hr physician fluoroscopy time;  Surgeon: Epifanio Sapp MD;  Location: [facility] OR     FUSION LUMBAR ANTERIOR, FUSION LUMBAR POSTERIOR TWO LEVELS, COMBINED  10/17/2013    lumbar fusion - Dr Floyd     INSERT PUMP BACLOFEN  04/2017    intrathecal baclofen pump implantation     IRRIGATION AND DEBRIDEMENT LOWER EXTREMITY, COMBINED Right 2015    Right ankle I&D d/t infection     OPEN REDUCTION INTERNAL FIXATION ANKLE  5/15    Right Bimalleolar ankle fx ORIF     OPEN REDUCTION INTERNAL FIXATION ANKLE Right 11/2015    Revision due to spasms pulling screws out of ankle     SINUS SURGERY  2011    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     XR LUMBAR EPIDURAL INJECTION INCL IMAGING  3/14    Left L4-5 Epidural Dr Winter       History of Present Illness: 53 year old woman admitted to the ICU for septic shock most likely related to a left ureteral stone.  The patient had a urologic procedure on March 15, 2019. The patient has a left ureteral stone and underwent left ureteroscopy with holmium laser lithotripsy and left ureteral stent exchange by Dr. Mayito Chauhan under general anesthesia at [facility] as an outpatient.  The patient did well on her PS trial; she demonstrated excellent TVs and FVCs. Her RR on the PS trial was 12/min.with tidal volumes in the 600s and 700s. The patient has now extubated and may need intermittent CPAP; she continues to demonstrate a significant leukocytosis (WBC count of 31 K today).   patient has a  indwelling intrathecal baclofen therapy pump.    Does not recall  Much  About getting septic after L ureteroscopy  Extubated today  No pain, feels fine  Off  Pressors,  Good urine output      Says her WBC has always  Run  high    Antibiotics:  Vancomycin  zosyn    Review of Systems: as per HPI    Social History: ,  3 children,  Lives in [facility]  Social History     Tobacco Use     Smoking status: Former Smoker      "Packs/day: 0.50     Types: Cigarettes     Last attempt to quit: 2013     Years since quittin.6     Smokeless tobacco: Never Used     Tobacco comment: since age 17   Substance Use Topics     Alcohol use: No        Family History:  Family History   Problem Relation Age of Onset     C.A.D. Father         with CHF      Cancer Father         ? unsure type - abdominal      Hypertension Mother      Thyroid Disease Mother         goiter      Breast Cancer Sister 58     Thyroid Disease Son      Cancer - colorectal No family hx of         Allergies:  This patient is allergic to is allergic to lisinopril; cyclobenzaprine; and flexeril [cyclobenzaprine hcl].     Physical Exam:  Vitals were reviewed  Blood pressure 98/57, pulse 85, temperature 98  F (36.7  C), resp. rate 16, height 1.702 m (5' 7\"), weight 122.4 kg (269 lb 13.5 oz), last menstrual period 2011, SpO2 98 %, not currently breastfeeding.  GEN: alert,  O x 3, non toxic,  Sitting in  chair  HEENT: oral clear  LUNG: CTA  COR: RRR,  No murmur  No CVAT  ABDOMEN: obese,  Soft,  NT,  ND  Boob  Clear/yellow urine,  Not cloudy  EXT: 2+ pitting edema in ankles,  Mild stasis dermatitis  No  rash    Data:  CBC  Recent Labs   Lab 19  0615 19  1200 19   WBC 31.1* 38.5* 13.2*   HGB 9.4* 9.8* 10.1*    321 346       BMP  Recent Labs   Lab 19  0615 19  1200 19    133 140   POTASSIUM 3.4 4.8 3.6   CHLORIDE 104 98 99   MARY 7.7* 7.7* 8.8   CO2 32 29 27   BUN 22 31* 20   CR 0.96 1.64* 0.68   * 123* 82       CULTURES  Recent Labs   Lab 19  1550 19  1455 19  1222 19  1200   CULT No growth after 19 hours Culture in progress 50,000 to 100,000 colonies/mL  Pseudomonas aeruginosa  *  Susceptibility testing in progress No growth after 22 hours      BC 3/16 NG  UC  K P.aeruginosa SUSY pending    Attestation:  I have reviewed today's vital signs, notes, medications, labs and " imaging.    ASSESSMENT:  1. SEPTIC SHOCK-resolved,  BC negative, UC  With  P.aeruginosa,  Onset after  L ureteroscopy,  Doing well,  JULIANNE  Resolved, off pressors  2. UTI,  UROSEPSIS-Pseudomonas aeruginosa,   SUSY pending  3. JULIANNE-Resolved  4.  OBESITY  5.  MS  6. H/O T CELL  LYMPHOMA    REC  1.  Cont zosyn  2. F/u MICs on Pseudomonas,  ?  Narrow to ceftaz  Or  cipro  3. Stop vancomycin  4. Probably  Rx 10-14  Days total,  Hopefully oral  cipro at  Some point  5. PT  Thanks,  Dr Elkins to f/u    ELIJAH LEW M.D.  O:[phone]   B:[phone]

## 2019-03-17 NOTE — PROGRESS NOTES
soft restraints restraints discontinued at 1:54 PM on 3/17/2019.    Restraint discontinue criteria met, patient is calm, cooperative and safe. Restraints removed.     Patient's Response: Needs reinforcement  Family Notification: Spouse/significant other  Attending Physician Notified: MD ordered restraint, Attending Physician's Name: Dr. Nancy Vides

## 2019-03-18 LAB
ANION GAP SERPL CALCULATED.3IONS-SCNC: 4 MMOL/L (ref 3–14)
BUN SERPL-MCNC: 13 MG/DL (ref 7–30)
CALCIUM SERPL-MCNC: 8.2 MG/DL (ref 8.5–10.1)
CHLORIDE SERPL-SCNC: 103 MMOL/L (ref 94–109)
CK SERPL-CCNC: 91 U/L (ref 30–225)
CO2 SERPL-SCNC: 34 MMOL/L (ref 20–32)
COPATH REPORT: NORMAL
CREAT SERPL-MCNC: 0.58 MG/DL (ref 0.52–1.04)
ERYTHROCYTE [DISTWIDTH] IN BLOOD BY AUTOMATED COUNT: 17.6 % (ref 10–15)
GFR SERPL CREATININE-BSD FRML MDRD: >90 ML/MIN/{1.73_M2}
GLUCOSE BLDC GLUCOMTR-MCNC: 104 MG/DL (ref 70–99)
GLUCOSE BLDC GLUCOMTR-MCNC: 111 MG/DL (ref 70–99)
GLUCOSE BLDC GLUCOMTR-MCNC: 129 MG/DL (ref 70–99)
GLUCOSE BLDC GLUCOMTR-MCNC: 142 MG/DL (ref 70–99)
GLUCOSE BLDC GLUCOMTR-MCNC: 64 MG/DL (ref 70–99)
GLUCOSE BLDC GLUCOMTR-MCNC: 70 MG/DL (ref 70–99)
GLUCOSE BLDC GLUCOMTR-MCNC: 88 MG/DL (ref 70–99)
GLUCOSE BLDC GLUCOMTR-MCNC: 99 MG/DL (ref 70–99)
GLUCOSE SERPL-MCNC: 105 MG/DL (ref 70–99)
HCT VFR BLD AUTO: 26.6 % (ref 35–47)
HGB BLD-MCNC: 8.2 G/DL (ref 11.7–15.7)
INTERPRETATION ECG - MUSE: NORMAL
MCH RBC QN AUTO: 26.7 PG (ref 26.5–33)
MCHC RBC AUTO-ENTMCNC: 30.8 G/DL (ref 31.5–36.5)
MCV RBC AUTO: 87 FL (ref 78–100)
PLATELET # BLD AUTO: 204 10E9/L (ref 150–450)
POTASSIUM SERPL-SCNC: 3.2 MMOL/L (ref 3.4–5.3)
POTASSIUM SERPL-SCNC: 3.7 MMOL/L (ref 3.4–5.3)
RBC # BLD AUTO: 3.07 10E12/L (ref 3.8–5.2)
SODIUM SERPL-SCNC: 141 MMOL/L (ref 133–144)
TRIGL SERPL-MCNC: 218 MG/DL
WBC # BLD AUTO: 21.6 10E9/L (ref 4–11)

## 2019-03-18 PROCEDURE — 12000000 ZZH R&B MED SURG/OB

## 2019-03-18 PROCEDURE — 25000132 ZZH RX MED GY IP 250 OP 250 PS 637: Performed by: INTERNAL MEDICINE

## 2019-03-18 PROCEDURE — 84478 ASSAY OF TRIGLYCERIDES: CPT | Performed by: INTERNAL MEDICINE

## 2019-03-18 PROCEDURE — 25000128 H RX IP 250 OP 636: Performed by: INTERNAL MEDICINE

## 2019-03-18 PROCEDURE — 85027 COMPLETE CBC AUTOMATED: CPT | Performed by: INTERNAL MEDICINE

## 2019-03-18 PROCEDURE — 82550 ASSAY OF CK (CPK): CPT | Performed by: INTERNAL MEDICINE

## 2019-03-18 PROCEDURE — 99231 SBSQ HOSP IP/OBS SF/LOW 25: CPT | Performed by: UROLOGY

## 2019-03-18 PROCEDURE — 84132 ASSAY OF SERUM POTASSIUM: CPT | Performed by: INTERNAL MEDICINE

## 2019-03-18 PROCEDURE — C9113 INJ PANTOPRAZOLE SODIUM, VIA: HCPCS | Performed by: INTERNAL MEDICINE

## 2019-03-18 PROCEDURE — 99233 SBSQ HOSP IP/OBS HIGH 50: CPT | Performed by: INTERNAL MEDICINE

## 2019-03-18 PROCEDURE — 00000146 ZZHCL STATISTIC GLUCOSE BY METER IP

## 2019-03-18 PROCEDURE — 25800030 ZZH RX IP 258 OP 636: Performed by: INTERNAL MEDICINE

## 2019-03-18 PROCEDURE — 80048 BASIC METABOLIC PNL TOTAL CA: CPT | Performed by: INTERNAL MEDICINE

## 2019-03-18 PROCEDURE — 25000128 H RX IP 250 OP 636: Performed by: SURGERY

## 2019-03-18 RX ORDER — AMITRIPTYLINE HYDROCHLORIDE 50 MG/1
100 TABLET ORAL AT BEDTIME
Status: DISCONTINUED | OUTPATIENT
Start: 2019-03-18 | End: 2019-03-26 | Stop reason: HOSPADM

## 2019-03-18 RX ORDER — OXYCODONE HYDROCHLORIDE 5 MG/1
15 TABLET ORAL EVERY 4 HOURS PRN
Status: DISCONTINUED | OUTPATIENT
Start: 2019-03-18 | End: 2019-03-26 | Stop reason: HOSPADM

## 2019-03-18 RX ORDER — AMOXICILLIN 250 MG
3 CAPSULE ORAL 2 TIMES DAILY
Status: DISCONTINUED | OUTPATIENT
Start: 2019-03-18 | End: 2019-03-26 | Stop reason: HOSPADM

## 2019-03-18 RX ORDER — POTASSIUM CHLORIDE 1500 MG/1
20 TABLET, EXTENDED RELEASE ORAL DAILY
Status: DISCONTINUED | OUTPATIENT
Start: 2019-03-18 | End: 2019-03-26 | Stop reason: HOSPADM

## 2019-03-18 RX ORDER — BACLOFEN 10 MG/1
10 TABLET ORAL 3 TIMES DAILY
Status: DISCONTINUED | OUTPATIENT
Start: 2019-03-18 | End: 2019-03-26 | Stop reason: HOSPADM

## 2019-03-18 RX ORDER — POLYETHYLENE GLYCOL 3350 17 G/17G
17 POWDER, FOR SOLUTION ORAL DAILY
Status: DISCONTINUED | OUTPATIENT
Start: 2019-03-18 | End: 2019-03-26 | Stop reason: HOSPADM

## 2019-03-18 RX ORDER — GABAPENTIN 600 MG/1
600 TABLET ORAL 3 TIMES DAILY
Status: DISCONTINUED | OUTPATIENT
Start: 2019-03-18 | End: 2019-03-26 | Stop reason: HOSPADM

## 2019-03-18 RX ORDER — POLYETHYLENE GLYCOL 3350 17 G/17G
17 POWDER, FOR SOLUTION ORAL 2 TIMES DAILY PRN
Status: DISCONTINUED | OUTPATIENT
Start: 2019-03-18 | End: 2019-03-26 | Stop reason: HOSPADM

## 2019-03-18 RX ORDER — CIPROFLOXACIN 2 MG/ML
400 INJECTION, SOLUTION INTRAVENOUS EVERY 12 HOURS
Status: DISCONTINUED | OUTPATIENT
Start: 2019-03-18 | End: 2019-03-20

## 2019-03-18 RX ORDER — ATORVASTATIN CALCIUM 10 MG/1
10 TABLET, FILM COATED ORAL DAILY
Status: DISCONTINUED | OUTPATIENT
Start: 2019-03-18 | End: 2019-03-26 | Stop reason: HOSPADM

## 2019-03-18 RX ORDER — FUROSEMIDE 40 MG
40 TABLET ORAL DAILY
Status: DISCONTINUED | OUTPATIENT
Start: 2019-03-18 | End: 2019-03-26 | Stop reason: HOSPADM

## 2019-03-18 RX ADMIN — HYDROMORPHONE HYDROCHLORIDE 0.5 MG: 1 INJECTION, SOLUTION INTRAMUSCULAR; INTRAVENOUS; SUBCUTANEOUS at 06:23

## 2019-03-18 RX ADMIN — SENNOSIDES AND DOCUSATE SODIUM 3 TABLET: 8.6; 5 TABLET ORAL at 21:48

## 2019-03-18 RX ADMIN — AMITRIPTYLINE HYDROCHLORIDE 100 MG: 50 TABLET, FILM COATED ORAL at 21:48

## 2019-03-18 RX ADMIN — POLYETHYLENE GLYCOL 3350 17 G: 17 POWDER, FOR SOLUTION ORAL at 09:43

## 2019-03-18 RX ADMIN — GABAPENTIN 600 MG: 600 TABLET, FILM COATED ORAL at 10:24

## 2019-03-18 RX ADMIN — TAZOBACTAM SODIUM AND PIPERACILLIN SODIUM 4.5 G: 500; 4 INJECTION, SOLUTION INTRAVENOUS at 01:03

## 2019-03-18 RX ADMIN — POTASSIUM CHLORIDE 20 MEQ: 1500 TABLET, EXTENDED RELEASE ORAL at 09:42

## 2019-03-18 RX ADMIN — ACETAMINOPHEN 650 MG: 325 SOLUTION ORAL at 02:26

## 2019-03-18 RX ADMIN — FUROSEMIDE 40 MG: 40 TABLET ORAL at 09:42

## 2019-03-18 RX ADMIN — SODIUM CHLORIDE, POTASSIUM CHLORIDE, SODIUM LACTATE AND CALCIUM CHLORIDE: 600; 310; 30; 20 INJECTION, SOLUTION INTRAVENOUS at 01:05

## 2019-03-18 RX ADMIN — CIPROFLOXACIN 400 MG: 2 INJECTION, SOLUTION INTRAVENOUS at 08:46

## 2019-03-18 RX ADMIN — POTASSIUM CHLORIDE 40 MEQ: 1.5 POWDER, FOR SOLUTION ORAL at 07:49

## 2019-03-18 RX ADMIN — OXYCODONE HYDROCHLORIDE 15 MG: 5 TABLET ORAL at 22:00

## 2019-03-18 RX ADMIN — HEPARIN SODIUM 7500 UNITS: 5000 INJECTION, SOLUTION INTRAVENOUS; SUBCUTANEOUS at 08:21

## 2019-03-18 RX ADMIN — HEPARIN SODIUM 7500 UNITS: 5000 INJECTION, SOLUTION INTRAVENOUS; SUBCUTANEOUS at 20:14

## 2019-03-18 RX ADMIN — BACLOFEN 10 MG: 10 TABLET ORAL at 21:48

## 2019-03-18 RX ADMIN — SODIUM CHLORIDE, POTASSIUM CHLORIDE, SODIUM LACTATE AND CALCIUM CHLORIDE: 600; 310; 30; 20 INJECTION, SOLUTION INTRAVENOUS at 07:49

## 2019-03-18 RX ADMIN — VITAMIN D, TAB 1000IU (100/BT) 1000 UNITS: 25 TAB at 08:23

## 2019-03-18 RX ADMIN — OXYCODONE HYDROCHLORIDE 15 MG: 5 TABLET ORAL at 11:18

## 2019-03-18 RX ADMIN — GABAPENTIN 600 MG: 600 TABLET, FILM COATED ORAL at 17:45

## 2019-03-18 RX ADMIN — GABAPENTIN 600 MG: 600 TABLET, FILM COATED ORAL at 21:48

## 2019-03-18 RX ADMIN — HYDROMORPHONE HYDROCHLORIDE 1 MG: 1 INJECTION, SOLUTION INTRAMUSCULAR; INTRAVENOUS; SUBCUTANEOUS at 02:57

## 2019-03-18 RX ADMIN — SENNOSIDES AND DOCUSATE SODIUM 3 TABLET: 8.6; 5 TABLET ORAL at 09:43

## 2019-03-18 RX ADMIN — POTASSIUM CHLORIDE 20 MEQ: 1.5 POWDER, FOR SOLUTION ORAL at 09:42

## 2019-03-18 RX ADMIN — PANTOPRAZOLE SODIUM 40 MG: 40 INJECTION, POWDER, FOR SOLUTION INTRAVENOUS at 08:16

## 2019-03-18 RX ADMIN — BACLOFEN 10 MG: 10 TABLET ORAL at 09:42

## 2019-03-18 RX ADMIN — ATORVASTATIN CALCIUM 10 MG: 10 TABLET, FILM COATED ORAL at 09:42

## 2019-03-18 RX ADMIN — ACETAMINOPHEN 650 MG: 325 SOLUTION ORAL at 07:24

## 2019-03-18 RX ADMIN — HYDROMORPHONE HYDROCHLORIDE 0.5 MG: 1 INJECTION, SOLUTION INTRAMUSCULAR; INTRAVENOUS; SUBCUTANEOUS at 08:45

## 2019-03-18 RX ADMIN — HYDROMORPHONE HYDROCHLORIDE 0.5 MG: 1 INJECTION, SOLUTION INTRAMUSCULAR; INTRAVENOUS; SUBCUTANEOUS at 00:58

## 2019-03-18 RX ADMIN — BACLOFEN 10 MG: 10 TABLET ORAL at 17:45

## 2019-03-18 RX ADMIN — CIPROFLOXACIN 400 MG: 2 INJECTION, SOLUTION INTRAVENOUS at 20:14

## 2019-03-18 ASSESSMENT — ACTIVITIES OF DAILY LIVING (ADL)
ADLS_ACUITY_SCORE: 30
ADLS_ACUITY_SCORE: 30
ADLS_ACUITY_SCORE: 31
ADLS_ACUITY_SCORE: 31
ADLS_ACUITY_SCORE: 30
ADLS_ACUITY_SCORE: 31

## 2019-03-18 NOTE — PLAN OF CARE
ICU End of Shift Summary.  For vital signs and complete assessments, please see documentation flowsheets.     Pertinent assessments: Patient disoriented at beginning of night, but now oriented. VSS with exception of BP. Hypotensive at times; levophed on and off, currently off. Tele SR. Afebrile. Good UOP per Bobo; cloudy with sediment present. LS CL on 4 LPM per NC. No BM this shift. RLE and generalized pain throughout night; PRN dilaudid given with relief.   Major Shift Events: Patient called multiple times per hour to be repositioned. PRN IV Dilaudid for pain.   Plan (Upcoming Events): Continue plan of care.   Discharge/Transfer Needs: Return to TCU when stable    Bedside Shift Report Completed :   Bedside Safety Check Completed:

## 2019-03-18 NOTE — PROGRESS NOTES
"[facility]  Infectious Disease Progress Note          Assessment and Plan:     ASSESSMENT:  1. 52 yo female acute SEPTIC SHOCK-resolved,  BC negative, UC  With  P.aeruginosa,  Onset after  L ureteroscopy,  Doing well,  JULIANNE  Resolved, off pressors  2. UTI,  UROSEPSIS-Pseudomonas aeruginosa,   SUSY pansens  3. JULIANNE-Resolved  4.  OBESITY  5.  MS  6. H/O T CELL  LYMPHOMA     REC  1.  To cipro Ok, po as soon as tomorrow , plan 2 weeks total ABX, day 3/14 today        Interval History:   no new complaints feels better out of ICU; no cp, sob, n/v/d, or abd pain. Chronic R leg pain, no other pain site  cxs as above              Medications:       amitriptyline  100 mg Oral At Bedtime     atorvastatin  10 mg Oral Daily     baclofen  10 mg Oral TID     ciprofloxacin  400 mg Intravenous Q12H     furosemide  40 mg Oral Daily     gabapentin  600 mg Oral TID     heparin  7,500 Units Subcutaneous Q12H     insulin aspart  1-6 Units Subcutaneous Q4H     polyethylene glycol  17 g Oral Daily     potassium chloride ER  20 mEq Oral Daily     senna-docusate  3 tablet Oral BID     sodium chloride (PF)  10 mL Intracatheter Q7 Days     vitamin D3  1,000 Units Oral Daily                  Physical Exam:   Blood pressure 120/49, pulse 74, temperature 96.1  F (35.6  C), temperature source Oral, resp. rate 18, height 1.702 m (5' 7\"), weight 122.4 kg (269 lb 13.5 oz), last menstrual period 12/11/2011, SpO2 97 %, not currently breastfeeding.  Wt Readings from Last 2 Encounters:   03/17/19 122.4 kg (269 lb 13.5 oz)   03/15/19 112.9 kg (249 lb)     Vital Signs with Ranges  Temp:  [96.1  F (35.6  C)-98  F (36.7  C)] 96.1  F (35.6  C)  Pulse:  [72-97] 74  Heart Rate:  [70-96] 76  Resp:  [7-31] 18  BP: ()/(41-91) 120/49  SpO2:  [89 %-100 %] 97 %    Constitutional: Awake, alert, cooperative, no apparent distress cognition Ok   Lungs: Clear to auscultation bilaterally, no crackles or wheezing   Cardiovascular: Regular rate and " rhythm, normal S1 and S2, and no murmur noted   Abdomen: Normal bowel sounds, soft, non-distended, non-tender   Skin: No rashes, no cyanosis, no edema   Other:           Data:   All microbiology laboratory data reviewed.  Recent Labs   Lab Test 03/18/19  0625 03/17/19  0615 03/16/19  1200   WBC 21.6* 31.1* 38.5*   HGB 8.2* 9.4* 9.8*   HCT 26.6* 31.0* 32.4*   MCV 87 87 88    273 321     Recent Labs   Lab Test 03/18/19  0625 03/17/19  0615 03/16/19  1200   CR 0.58 0.96 1.64*     Recent Labs   Lab Test 11/01/17  1623   SED 39*     Recent Labs   Lab Test 03/16/19  1550 03/16/19  1455 03/16/19  1222 03/16/19  1200 02/04/19  2130 01/31/19  0410 01/30/19  2120 01/30/19  2016 01/30/19  1522   CULT No growth after 2 days Culture in progress 50,000 to 100,000 colonies/mL  Pseudomonas aeruginosa  *  Culture in progress No growth after 2 days Cultured on the 2nd day of incubation:  Staphylococcus epidermidis  *  Critical Value/Significant Value, preliminary result only, called to and read back by  Claudia Marsh RN [id number] 2/6/19. MS    (Note)  POSITIVE for STAPHYLOCOCCUS EPIDERMIDIS and POSITIVE for the mecA  gene (resistant to methicillin) by EmSense multiplex nucleic acid  test. Final identification and antimicrobial susceptibility testing  will be verified by standard methods.      Specimen tested with Verigene multiplex, gram-positive blood culture  nucleic acid test for the following targets: Staph aureus, Staph  epidermidis, Staph lugdunensis, other Staph species, Enterococcus  faecalis, Enterococcus faecium, Streptococcus species, S. agalactiae,  S. anginosus grp., S. pneumoniae, S. pyogenes, Listeria sp., mecA  (methicillin resistance) and Randolph/B (vancomycin resistance).      Critical Value/Significant Value called to and read back by FREDO ESPINO 02.06.2019 AT 7.43AM,ZG    No growth Moderate growth  Normal geovanna   Light growth  Escherichia coli  *  Moderate growth  beta hemolytic   Streptococcus  constellatus  *  Moderate growth  Normal skin geovanna    Moderate growth  Normal skin geovanna    Moderate growth  beta hemolytic   Streptococcus constellatus  Susceptibility testing done on previous specimen  * 50,000 to 100,000 colonies/mL  Lactose fermenting gram negative rods  *  10,000 to 50,000 colonies/mL  Strain 2  Lactose fermenting gram negative rods  *  <10,000 colonies/mL  Strain 3  Lactose fermenting gram negative rods  *  <10,000 colonies/mL  Strain 4  Lactose fermenting gram negative rods  *  Susceptibility testing not routinely done Cultured on the 1st day of incubation:  Escherichia coli  Susceptibility testing done on previous specimen  *  Critical Value/Significant Value, preliminary result only, called to and read back by  Beronica Woods RN @[id number] 01/31/19 gd

## 2019-03-18 NOTE — PHARMACY
Obtained baclofen pump information:  Managed by [facility], spoke to Joelne at [phone]  Pump contains baclofen 2000mcg/ml  Alarm date: 3/29/19  Dose-413mcg/day  Reservoir contains 2ml after the alarm date.

## 2019-03-18 NOTE — PROGRESS NOTES
[facility]  Hospitalist Progress Note  Lucía Stephenson MD  03/18/2019    Assessment & Plan   :Amanda Richardson is a 53 year old  female with a significant past medical history of complex medical problems with frequent hospitalization discharged last month on February 15, 2019 after 2 weeks stay in the hospital for septic shock, respiratory failure who currently presents from nursing home with decreased level of consciousness.  Patient had a urologic procedure on March 15, 2019.  She had cystoscopy, left ureteroscopy with holmium laser lithotripsy and left ureteral stent exchange by Dr. Mayito Chauhan under general anesthesia at [facility] outpatient surgery.  Patient remained lethargic since the procedure was done and had a hard time getting up to a wheelchair to eat.  Emergency medical team were called and patient was found to have hypoxia with oxygen saturation 72% room air and patient was placed on oxygen and was taken to the emergency department for evaluation.  The emergency department patient was stabilized and required emergency intubation and further evaluations done.  On arrival to the emergency department patient's blood pressure was 81/52, temperature was 101.3 and mental status was depressed.  Further studies showed evidence of elevated creatinine at 1.64 from her normal baseline, slightly elevated troponin at 0.050, significant leukocytosis with WBC count of 38.5 with left shift.        Admission diagnoses:      #1. Severe sepsis due to ureteral stone with lithotripsy and stent exchange.  -- needed low dose levophed to maintain blood pressure  -- was on  abx with vanco and zosyn.vanco was stopped yesterday  Urine culture grew 50-100k pseudomonas pan sensitive  Switch zosyn to IV cipro  BID today   -- wbc improving 38K >> 31K>>21K   -- creatinine and LA improving.    2. Acute respiratory failure due to severe sepsis, metabolic encephalopathy.  --extubated  "yesterday  -encourage IS     3. Acute metabolic encephalopathy: Likely septic encephalopathy.    -- Suspect anesthesia,  narcotics medications in sepsis.  -- improved   Restarted PTA meds today      4. JULIANNE  -- creatinine improved 1.6 < 0.96  -- good urine output  Restarted PTA lasix 40mg po daily today    5. Hx of MS, chronic pain  -- restarted  baclofen, narcotics, neurontin      6. HTN  -- all bp meds(toprol, hydrochlorothiazide, cozaar)  on hold due to sepsis and hypotension.    7. DM II  -- BG control is good  -- A1c 6.5  -- metformin on hold  -- blood sugars mostly 90s to 100s today      8. DVT prophylaxis  -- subcutaneous heparin     9. Code status  -- Full                 Code Status/Disposition:             Interval History   -- chart reviewed and discussed with RN  -- extubated and off of pressors. Denies SOB or chest pain     -Data reviewed today: I reviewed all new labs and imaging over the last 24 hours. I personally reviewed no images or EKG's today.    Physical Exam   Heart Rate: 73, Blood pressure 122/60, pulse 73, temperature 96.3  F (35.7  C), resp. rate 16, height 1.702 m (5' 7\"), weight 122.4 kg (269 lb 13.5 oz), last menstrual period 12/11/2011, SpO2 96 %, not currently breastfeeding.  Vitals:    03/16/19 1530 03/17/19 0300   Weight: 119.6 kg (263 lb 10.7 oz) 122.4 kg (269 lb 13.5 oz)     Vital Signs with Ranges  Temp:  [95.7  F (35.4  C)-98  F (36.7  C)] 96.3  F (35.7  C)  Pulse:  [] 73  Heart Rate:  [] 73  Resp:  [6-35] 16  BP: ()/(41-91) 122/60  SpO2:  [89 %-100 %] 96 %  I/O's Last 24 hours  I/O last 3 completed shifts:  In: 4491.85 [I.V.:4461.85; NG/GT:30]  Out: 3950 [Urine:3950]    Constitutional: Sitting comfortably in chair. Alert and awake   Respiratory: Bibasilar crackles heard   Cardiovascular: Regular rate and rhythm, normal S1 and S2, and no murmur noted  GI: Normal bowel sounds, soft, non-distended, non-tender  Skin/Integumen: No rashes, no cyanosis, no " edema  Other:      Medications   All medications were reviewed.    lactated ringers 50 mL/hr at 03/18/19 0815     norepinephrine Stopped (03/18/19 0620)     sodium chloride 10 mL/hr at 03/18/19 1000       amitriptyline  100 mg Oral At Bedtime     atorvastatin  10 mg Oral Daily     baclofen  10 mg Oral TID     ciprofloxacin  400 mg Intravenous Q12H     furosemide  40 mg Oral Daily     gabapentin  600 mg Oral TID     heparin  7,500 Units Subcutaneous Q12H     insulin aspart  1-6 Units Subcutaneous Q4H     polyethylene glycol  17 g Oral Daily     potassium chloride ER  20 mEq Oral Daily     senna-docusate  3 tablet Oral BID     sodium chloride (PF)  10 mL Intracatheter Q7 Days     vitamin D3  1,000 Units Oral Daily        Data   Recent Labs   Lab 03/18/19  0625 03/17/19  0615 03/16/19  1725 03/16/19  1200   WBC 21.6* 31.1*  --  38.5*   HGB 8.2* 9.4*  --  9.8*   MCV 87 87  --  88    273  --  321    139  --  133   POTASSIUM 3.2* 3.4  --  4.8   CHLORIDE 103 104  --  98   CO2 34* 32  --  29   BUN 13 22  --  31*   CR 0.58 0.96  --  1.64*   ANIONGAP 4 3  --  6   MARY 8.2* 7.7*  --  7.7*   * 120*  --  123*   ALBUMIN  --  2.1*  --  2.3*   PROTTOTAL  --  6.9  --  7.3   BILITOTAL  --  0.9  --  0.7   ALKPHOS  --  170*  --  171*   ALT  --  29  --  28   AST  --  36  --  31   TROPI  --   --  0.061* 0.050*       No results found for this or any previous visit (from the past 24 hour(s)).

## 2019-03-18 NOTE — PROGRESS NOTES
[facility] Progress Note          Assessment and Plan:   Impression:    53 year old female with apparent urosepsis, POD 3 s/p left ureteroscopy and stone treatment with Dr. Chauhan. Stent appears in good position and urine is clear with Bobo in place. No indications for further urologic intervention at this time. Agree with Bobo for maximal urinary decompression. Broad spectrum antibiotics.      Plan:    - Broad spectrum empiric antibiotics, continue Bobo for max drainage  - Awaiting urine and blood cultures  - Appreciate ICU care and ID consultation. On Cipro  - Urology will follow. Dr. Chauhan will see later today.          Lori Sen PA-C  [facility]  [phone]               Interval History:   Doing better; extubated.               Review of Systems:   The 5 point Review of Systems is negative other than noted in the HPI             Medications:     Current Facility-Administered Medications Ordered in Epic   Medication Dose Route Frequency Last Rate Last Dose     acetaminophen (TYLENOL) solution 650 mg  650 mg Oral Q4H PRN   650 mg at 03/18/19 0724     baclofen (LIORESAL) tablet 10 mg  10 mg Oral Daily PRN   10 mg at 03/17/19 1444     bisacodyl (DULCOLAX) Suppository 10 mg  10 mg Rectal Daily PRN         ciprofloxacin (CIPRO) infusion 400 mg  400 mg Intravenous Q12H         glucose gel 15-30 g  15-30 g Oral Q15 Min PRN        Or     dextrose 50 % injection 25-50 mL  25-50 mL Intravenous Q15 Min PRN        Or     glucagon injection 1 mg  1 mg Subcutaneous Q15 Min PRN         docusate sodium (COLACE) capsule 100 mg  100 mg Oral BID PRN   100 mg at 03/17/19 2206     heparin sodium injection 7,500 Units  7,500 Units Subcutaneous Q12H   7,500 Units at 03/18/19 0821     HYDROmorphone (PF) (DILAUDID) injection 0.5-1 mg  0.5-1 mg Intravenous Q2H PRN   0.5 mg at 03/18/19 0623     insulin aspart (NovoLOG) inj (RAPID ACTING)  1-6 Units Subcutaneous Q4H         lactated ringers BOLUS  500 mL  500 mL Intravenous Once PRN         lactated ringers infusion   Intravenous Continuous 50 mL/hr at 03/18/19 0815       lidocaine 1 % 0.5-5 mL  0.5-5 mL Other Once PRN         magnesium sulfate 4 g in 100 mL sterile water (premade)  4 g Intravenous Q4H PRN         melatonin tablet 1 mg  1 mg Oral At Bedtime PRN         naloxone (NARCAN) injection 0.1-0.4 mg  0.1-0.4 mg Intravenous Q2 Min PRN         norepinephrine (LEVOPHED) 16 mg in D5W 250 mL infusion  0.03-0.4 mcg/kg/min Intravenous Continuous   Stopped at 03/18/19 0620     ondansetron (ZOFRAN-ODT) ODT tab 4 mg  4 mg Oral Q6H PRN        Or     ondansetron (ZOFRAN) injection 4 mg  4 mg Intravenous Q6H PRN   4 mg at 03/17/19 2209     potassium chloride (KLOR-CON) Packet 20-40 mEq  20-40 mEq Oral or Feeding Tube Q2H PRN   40 mEq at 03/18/19 0749     potassium chloride 10 mEq in 100 mL intermittent infusion with 10 mg lidocaine  10 mEq Intravenous Q1H PRN         potassium chloride 10 mEq in 100 mL sterile water intermittent infusion (premix)  10 mEq Intravenous Q1H PRN         potassium chloride 20 mEq in 50 mL intermittent infusion  20 mEq Intravenous Q1H PRN         potassium chloride ER (K-DUR/KLOR-CON M) CR tablet 20-40 mEq  20-40 mEq Oral Q2H PRN         sodium chloride (PF) 0.9% PF flush 10 mL  10 mL Intracatheter Q7 Days   10 mL at 03/16/19 1548     sodium chloride (PF) 0.9% PF flush 10-20 mL  10-20 mL Intracatheter q1 min prn   20 mL at 03/16/19 1725     sodium chloride 0.9% infusion   Intravenous Continuous 10 mL/hr at 03/18/19 0600       vitamin D3 (CHOLECALCIFEROL) 1000 units (25 mcg) tablet 1,000 Units  1,000 Units Oral Daily   1,000 Units at 03/18/19 0823     No current [facility]-ordered outpatient medications on file.                  Physical Exam:   Vitals were reviewed  Patient Vitals for the past 8 hrs:   BP Temp Temp src Pulse Heart Rate Resp SpO2   03/18/19 0800 122/64 96.3  F (35.7  C) -- 77 75 16 97 %   03/18/19 0700 117/58 96.4  F (35.8  C)  -- 78 -- -- 96 %   03/18/19 0645 124/62 96.3  F (35.7  C) -- 76 77 -- 96 %   03/18/19 0630 120/61 96.4  F (35.8  C) -- 75 77 -- 96 %   03/18/19 0615 112/70 96.4  F (35.8  C) -- 79 80 -- (!) 89 %   03/18/19 0545 114/64 96.3  F (35.7  C) -- 75 71 -- 94 %   03/18/19 0530 102/52 96.3  F (35.7  C) -- 72 73 -- 94 %   03/18/19 0515 110/60 96.3  F (35.7  C) -- 78 70 -- 97 %   03/18/19 0500 107/58 96.3  F (35.7  C) -- 81 82 23 95 %   03/18/19 0445 91/41 96.4  F (35.8  C) -- 81 80 (!) 31 95 %   03/18/19 0430 94/44 96.4  F (35.8  C) Bladder 88 86 16 94 %   03/18/19 0415 99/46 96.4  F (35.8  C) -- 87 86 8 95 %   03/18/19 0400 104/56 96.4  F (35.8  C) -- 91 88 (!) 7 92 %   03/18/19 0345 96/55 96.4  F (35.8  C) -- 92 90 9 93 %   03/18/19 0330 90/43 96.6  F (35.9  C) -- 92 93 10 92 %   03/18/19 0315 102/52 96.8  F (36  C) -- 94 92 9 93 %   03/18/19 0300 105/57 96.8  F (36  C) -- 90 89 10 96 %   03/18/19 0245 109/59 97  F (36.1  C) -- 88 88 9 93 %   03/18/19 0230 119/60 97  F (36.1  C) -- 89 92 13 95 %   03/18/19 0215 122/55 97  F (36.1  C) -- 93 93 10 94 %   03/18/19 0200 122/69 97  F (36.1  C) -- 92 94 15 95 %   03/18/19 0145 100/55 97  F (36.1  C) -- 92 91 13 96 %   03/18/19 0130 123/59 97  F (36.1  C) -- 80 91 20 96 %   03/18/19 0115 127/60 97  F (36.1  C) -- 81 78 15 97 %   03/18/19 0100 118/59 97  F (36.1  C) -- 80 80 9 97 %   03/18/19 0045 119/57 96.8  F (36  C) -- 80 82 12 97 %     GEN: NAD, resting  EYES: EOMI  MOUTH: MMM  NECK: Supple  RESP: Unlabored breathing  ABD: soft  : Clear           Data:   No results found for: NTBNPI, NTBNP  Lab Results   Component Value Date    WBC 21.6 (H) 03/18/2019    WBC 31.1 (H) 03/17/2019    WBC 38.5 (H) 03/16/2019    HGB 8.2 (L) 03/18/2019    HGB 9.4 (L) 03/17/2019    HGB 9.8 (L) 03/16/2019    HCT 26.6 (L) 03/18/2019    HCT 31.0 (L) 03/17/2019    HCT 32.4 (L) 03/16/2019    MCV 87 03/18/2019    MCV 87 03/17/2019    MCV 88 03/16/2019     03/18/2019     03/17/2019      03/16/2019     Lab Results   Component Value Date    INR 0.93 10/03/2013

## 2019-03-18 NOTE — PLAN OF CARE
Up to unit around 1430 from ICU and settled.   A&Ox4, Ax2 lift.   VSS, 2 L O2, reports pain in R leg--declines meds at this time, heating pad applied.   Bobo patent--850 clear tasha urine out.   PICC saline locked.   K 3.2, replaced on days--recheck drawn.   Will continue to monitor.

## 2019-03-19 LAB
ANION GAP SERPL CALCULATED.3IONS-SCNC: 2 MMOL/L (ref 3–14)
BACTERIA SPEC CULT: ABNORMAL
BUN SERPL-MCNC: 9 MG/DL (ref 7–30)
CALCIUM SERPL-MCNC: 8.2 MG/DL (ref 8.5–10.1)
CHLORIDE SERPL-SCNC: 101 MMOL/L (ref 94–109)
CO2 SERPL-SCNC: 36 MMOL/L (ref 20–32)
CREAT SERPL-MCNC: 0.58 MG/DL (ref 0.52–1.04)
ERYTHROCYTE [DISTWIDTH] IN BLOOD BY AUTOMATED COUNT: 17.8 % (ref 10–15)
GFR SERPL CREATININE-BSD FRML MDRD: >90 ML/MIN/{1.73_M2}
GLUCOSE BLDC GLUCOMTR-MCNC: 106 MG/DL (ref 70–99)
GLUCOSE BLDC GLUCOMTR-MCNC: 116 MG/DL (ref 70–99)
GLUCOSE BLDC GLUCOMTR-MCNC: 124 MG/DL (ref 70–99)
GLUCOSE BLDC GLUCOMTR-MCNC: 76 MG/DL (ref 70–99)
GLUCOSE BLDC GLUCOMTR-MCNC: 92 MG/DL (ref 70–99)
GLUCOSE BLDC GLUCOMTR-MCNC: 96 MG/DL (ref 70–99)
GLUCOSE SERPL-MCNC: 105 MG/DL (ref 70–99)
HCT VFR BLD AUTO: 29.4 % (ref 35–47)
HGB BLD-MCNC: 8.7 G/DL (ref 11.7–15.7)
Lab: ABNORMAL
MCH RBC QN AUTO: 26 PG (ref 26.5–33)
MCHC RBC AUTO-ENTMCNC: 29.6 G/DL (ref 31.5–36.5)
MCV RBC AUTO: 88 FL (ref 78–100)
PLATELET # BLD AUTO: 249 10E9/L (ref 150–450)
POTASSIUM SERPL-SCNC: 3.8 MMOL/L (ref 3.4–5.3)
RBC # BLD AUTO: 3.35 10E12/L (ref 3.8–5.2)
SODIUM SERPL-SCNC: 139 MMOL/L (ref 133–144)
SPECIMEN SOURCE: ABNORMAL
WBC # BLD AUTO: 16.9 10E9/L (ref 4–11)

## 2019-03-19 PROCEDURE — 99231 SBSQ HOSP IP/OBS SF/LOW 25: CPT | Performed by: UROLOGY

## 2019-03-19 PROCEDURE — 99207 ZZC CDG-MDM COMPONENT: MEETS LOW - DOWN CODED: CPT | Performed by: INTERNAL MEDICINE

## 2019-03-19 PROCEDURE — 25000132 ZZH RX MED GY IP 250 OP 250 PS 637: Performed by: INTERNAL MEDICINE

## 2019-03-19 PROCEDURE — 99232 SBSQ HOSP IP/OBS MODERATE 35: CPT | Performed by: INTERNAL MEDICINE

## 2019-03-19 PROCEDURE — 00000146 ZZHCL STATISTIC GLUCOSE BY METER IP

## 2019-03-19 PROCEDURE — 25800030 ZZH RX IP 258 OP 636: Performed by: INTERNAL MEDICINE

## 2019-03-19 PROCEDURE — 12000000 ZZH R&B MED SURG/OB

## 2019-03-19 PROCEDURE — 85027 COMPLETE CBC AUTOMATED: CPT | Performed by: INTERNAL MEDICINE

## 2019-03-19 PROCEDURE — 25000128 H RX IP 250 OP 636: Performed by: INTERNAL MEDICINE

## 2019-03-19 PROCEDURE — 80048 BASIC METABOLIC PNL TOTAL CA: CPT | Performed by: INTERNAL MEDICINE

## 2019-03-19 RX ORDER — DEXTROSE MONOHYDRATE 25 G/50ML
25-50 INJECTION, SOLUTION INTRAVENOUS
Status: DISCONTINUED | OUTPATIENT
Start: 2019-03-19 | End: 2019-03-26 | Stop reason: HOSPADM

## 2019-03-19 RX ORDER — NICOTINE POLACRILEX 4 MG
15-30 LOZENGE BUCCAL
Status: DISCONTINUED | OUTPATIENT
Start: 2019-03-19 | End: 2019-03-26 | Stop reason: HOSPADM

## 2019-03-19 RX ADMIN — BACLOFEN 10 MG: 10 TABLET ORAL at 21:45

## 2019-03-19 RX ADMIN — HEPARIN SODIUM 7500 UNITS: 5000 INJECTION, SOLUTION INTRAVENOUS; SUBCUTANEOUS at 20:16

## 2019-03-19 RX ADMIN — GABAPENTIN 600 MG: 600 TABLET, FILM COATED ORAL at 16:01

## 2019-03-19 RX ADMIN — CIPROFLOXACIN 400 MG: 2 INJECTION, SOLUTION INTRAVENOUS at 20:17

## 2019-03-19 RX ADMIN — ACETAMINOPHEN 650 MG: 325 SOLUTION ORAL at 06:28

## 2019-03-19 RX ADMIN — OXYCODONE HYDROCHLORIDE 15 MG: 5 TABLET ORAL at 14:09

## 2019-03-19 RX ADMIN — POLYETHYLENE GLYCOL 3350 17 G: 17 POWDER, FOR SOLUTION ORAL at 08:54

## 2019-03-19 RX ADMIN — OXYCODONE HYDROCHLORIDE 15 MG: 5 TABLET ORAL at 19:08

## 2019-03-19 RX ADMIN — SENNOSIDES AND DOCUSATE SODIUM 3 TABLET: 8.6; 5 TABLET ORAL at 20:16

## 2019-03-19 RX ADMIN — BACLOFEN 10 MG: 10 TABLET ORAL at 16:01

## 2019-03-19 RX ADMIN — HEPARIN SODIUM 7500 UNITS: 5000 INJECTION, SOLUTION INTRAVENOUS; SUBCUTANEOUS at 08:51

## 2019-03-19 RX ADMIN — DAPTOMYCIN 500 MG: 500 INJECTION, POWDER, LYOPHILIZED, FOR SOLUTION INTRAVENOUS at 14:11

## 2019-03-19 RX ADMIN — GABAPENTIN 600 MG: 600 TABLET, FILM COATED ORAL at 21:45

## 2019-03-19 RX ADMIN — AMITRIPTYLINE HYDROCHLORIDE 100 MG: 50 TABLET, FILM COATED ORAL at 21:45

## 2019-03-19 RX ADMIN — ATORVASTATIN CALCIUM 10 MG: 10 TABLET, FILM COATED ORAL at 08:55

## 2019-03-19 RX ADMIN — OXYCODONE HYDROCHLORIDE 15 MG: 5 TABLET ORAL at 09:57

## 2019-03-19 RX ADMIN — VITAMIN D, TAB 1000IU (100/BT) 1000 UNITS: 25 TAB at 08:55

## 2019-03-19 RX ADMIN — BACLOFEN 10 MG: 10 TABLET ORAL at 08:54

## 2019-03-19 RX ADMIN — GABAPENTIN 600 MG: 600 TABLET, FILM COATED ORAL at 08:56

## 2019-03-19 RX ADMIN — POTASSIUM CHLORIDE 20 MEQ: 1500 TABLET, EXTENDED RELEASE ORAL at 08:56

## 2019-03-19 RX ADMIN — OXYCODONE HYDROCHLORIDE 15 MG: 5 TABLET ORAL at 04:36

## 2019-03-19 RX ADMIN — FUROSEMIDE 40 MG: 40 TABLET ORAL at 08:56

## 2019-03-19 RX ADMIN — SENNOSIDES AND DOCUSATE SODIUM 3 TABLET: 8.6; 5 TABLET ORAL at 09:58

## 2019-03-19 RX ADMIN — CIPROFLOXACIN 400 MG: 2 INJECTION, SOLUTION INTRAVENOUS at 08:47

## 2019-03-19 ASSESSMENT — MIFFLIN-ST. JEOR: SCORE: 1860.51

## 2019-03-19 ASSESSMENT — ACTIVITIES OF DAILY LIVING (ADL)
ADLS_ACUITY_SCORE: 30

## 2019-03-19 NOTE — PROGRESS NOTES
[facility]    Hospitalist Progress Note      Assessment & Plan   Amanda Richardson is a 53 year old female who was admitted on 3/16/2019.  Past medical history is complex with frequent hospitalizations.  Recently discharged February 15, 2000 2:19 week hospitalization for septic shock, respiratory failure.  Patient underwent outpatient urologic procedure 3/15/19 with lithotripsy and left ureteral stent exchange with Dr. Chauhan under general anesthesia.  Patient remained lethargic following procedure.  Patient was ultimately hypoxic following procedure and taken to the emergency department where she was ultimately found to be septic and emergently intubated.  Patient admitted to the ICU for further evaluation and treatment.    Septic shock secondary to ureteral stone with lithotripsy and left ureteral stent exchange   Patient initially required low-dose levo fed to maintain blood pressure.  Initially treated with vancomycin and Zosyn.  Urine culture growing ,000 Pseudomonas which is pansensitive.  Infectious disease following and anabiotic's change to IV Cipro twice daily.  Giving additional finding of VRE, daptomycin has been started as well.  Appreciate infectious disease recommendations.  Continue Bobo for maximal urinary decompression.  Urology plans to take out stent next week, though urology plans to continue to follow to ensure patient recovers adequately.    Acute respiratory failure secondary to severe sepsis  Metabolic encephalopathy secondary to sepsis  Patient required emergent intubation in the ER and was extubated 3/17/19.  Continue to encourage incentive spirometer.    History of hypertension  PTA medications on hold given septic shock on admission.  Patient with only one elevated blood pressure reading today and all others have been within normal limits.  Will continue to follow closely.    Acute kidney injury, resolved  Renal function has improved to baseline.  Patient with good  urine output.  Restarted PTA Lasix 40 mg p.o. daily.  Renally dose medications and avoid nephrotoxins.    History of multiple sclerosis  Chronic pain  Resume baclofen, PTA narcotics, PTA Neurontin, PTA amitriptyline.     Type 2 diabetes mellitus  Hemoglobin A1c 6.5%.  PTA metformin on hold.  Diabetic diet.  Before meals at bedtime glucose checks with sliding scale insulin.    Hyperlipidemia  Continue PTA statin.    DVT Prophylaxis: subQ heparin  Code Status: Full Code  Expected discharge: Anticipate hospitalization at least 2-3 more days with IV daptomycin.     Yanna Echavarria MD FACP  Hospitalist Service  [facility]  Text Page (7am - 6pm)      Interval History   No acute events.  Patient does have VRE growing in urine, as well.  Infectious disease has started patient on daptomycin.  Patient without any new complaints.    -Data reviewed today: I reviewed all new labs and imaging results over the last 24 hours.      Physical Exam   Temp: 98  F (36.7  C) Temp src: Oral BP: 152/65   Heart Rate: 87 Resp: 18 SpO2: 100 % O2 Device: Nasal cannula Oxygen Delivery: 2 LPM  Vitals:    03/16/19 1530 03/17/19 0300 03/19/19 0441   Weight: 119.6 kg (263 lb 10.7 oz) 122.4 kg (269 lb 13.5 oz) 123.3 kg (271 lb 12.8 oz)     Vital Signs with Ranges  Temp:  [97.4  F (36.3  C)-98  F (36.7  C)] 98  F (36.7  C)  Heart Rate:  [79-87] 87  Resp:  [14-18] 18  BP: (113-152)/(51-65) 152/65  SpO2:  [90 %-100 %] 100 %  I/O last 3 completed shifts:  In: 1430 [P.O.:1430]  Out: 5500 [Urine:5500]    Constitutional: Morbidly obese. Alert and oriented x3. No acute distress. Sitting up in chair with  at bedside. Watching TV. Patient non-toxic. Pleasant.   HEENT: NCAT. EOMI. Moist oral mucosa.  Respiratory: Clear to auscultation bilaterally, though diminished throughout. No crackles or wheezes.  Cardiovascular: Regular rate and rhythm. No murmur.  GI: Morbidly obese. Soft, nontender, nondistended.   Musculoskeletal: Chronic venous  stasis changes bilaterally. 1+ edema. Otherwise no gross deformities. Generalized weakness throughout.  Neurologic: Alert and oriented x3. No focal neurologic deficits.       Medications     Medication given by intrathecal pump: This is NOT an order to dispense medication. For information only.       sodium chloride Stopped (03/18/19 1301)       amitriptyline  100 mg Oral At Bedtime     atorvastatin  10 mg Oral Daily     baclofen  10 mg Oral TID     ciprofloxacin  400 mg Intravenous Q12H     DAPTOmycin (CUBICIN) intermittent infusion  4 mg/kg Intravenous Q24H     furosemide  40 mg Oral Daily     gabapentin  600 mg Oral TID     heparin  7,500 Units Subcutaneous Q12H     insulin aspart  1-7 Units Subcutaneous TID AC     insulin aspart  1-5 Units Subcutaneous At Bedtime     polyethylene glycol  17 g Oral Daily     potassium chloride ER  20 mEq Oral Daily     senna-docusate  3 tablet Oral BID     sodium chloride (PF)  10 mL Intracatheter Q7 Days     vitamin D3  1,000 Units Oral Daily       Data   Recent Labs   Lab 03/19/19  0626 03/18/19  1530 03/18/19  0625 03/17/19  0615 03/16/19  1725 03/16/19  1200   WBC 16.9*  --  21.6* 31.1*  --  38.5*   HGB 8.7*  --  8.2* 9.4*  --  9.8*   MCV 88  --  87 87  --  88     --  204 273  --  321     --  141 139  --  133   POTASSIUM 3.8 3.7 3.2* 3.4  --  4.8   CHLORIDE 101  --  103 104  --  98   CO2 36*  --  34* 32  --  29   BUN 9  --  13 22  --  31*   CR 0.58  --  0.58 0.96  --  1.64*   ANIONGAP 2*  --  4 3  --  6   MARY 8.2*  --  8.2* 7.7*  --  7.7*   *  --  105* 120*  --  123*   ALBUMIN  --   --   --  2.1*  --  2.3*   PROTTOTAL  --   --   --  6.9  --  7.3   BILITOTAL  --   --   --  0.9  --  0.7   ALKPHOS  --   --   --  170*  --  171*   ALT  --   --   --  29  --  28   AST  --   --   --  36  --  31   TROPI  --   --   --   --  0.061* 0.050*       No results found for this or any previous visit (from the past 24 hour(s)).

## 2019-03-19 NOTE — PLAN OF CARE
Ambulatory Status:  Pt not oob this shift  VS:  T 95.9, P 75, R 14, /56, O2 95% on 2 L via NC  Pain:  Reports leg spasms, oxy given with good results.  Resp: LS clear and equal bilaterally. Pt denies any SOB or chest pain.  GI:  Denies nausea.  Poor appetite and on full liquid diet.  BS hypoactive.  Passing flatus.  Last BM 3-.  :  Bobo in and patent  Skin:  reddened moist skin folds. BLE red glenn.  TX: BS 64 orange juice given recheck 70. Pt ordered supper recheck 88.  insulin not given.  Consults:  ID  Disposition:  will continue to monitor pt.

## 2019-03-19 NOTE — PROGRESS NOTES
Infection Prevention:    Patient requires Contact precautions because of VRE: Urine, 3/16/19. Please contact Infection Prevention with any questions/concerns at *[phone].    Cecelia Matta, ICP

## 2019-03-19 NOTE — PLAN OF CARE
Pt remains hospitalized for septic shock  Vital signs stable, on 2L NC, BG checks q 4 hrs 92, 76  Oxycodone x 1 and Tylenol x 1 for hip/leg pain  Up with assist x 2 with lift, repositioning.   PIV and PICC saline locked, continues IV Cipro  Bobo with adequate output   LE glenn, coccyx red, blanchable.

## 2019-03-19 NOTE — PLAN OF CARE
Up  in chair using lift -- repositioned q 2 hours --appetite good able to feed self --- ramos draining well --- picc line patent --- medicated for pain as requested with oxy ----alert and oriented ----weaning off O2  --- planning discharge soon

## 2019-03-19 NOTE — PROGRESS NOTES
"[facility]  Infectious Disease Progress Note          Assessment and Plan:     ASSESSMENT:  1. 52 yo female acute SEPTIC SHOCK-resolved,  BC negative, UC  With  P.aeruginosa,  Onset after  L ureteroscopy,  Doing well,  JULIANNE  Resolved, off pressors  2. UTI,  UROSEPSIS-Pseudomonas aeruginosa,   SUSY pansens  3. JULIANNE-Resolved  4.  OBESITY  5.  MS  6 VRE colonization  6. H/O T CELL  LYMPHOMA     REC  1.  To cipro , po as soon as any time Ok at 500 bid , plan 2 weeks total ABX, day 4/14 today  2 Would normally ignore VRE UC but with uriary manipulation give a bit of dapto(linezolid drug interactions so avoid), only a couple doses planned  3 Discussed VRE/iso issues in detail        Interval History:   no new complaints feels better out of ICU; no cp, sob, n/v/d, or abd pain. Chronic R leg pain, no other pain site  cxs as above  cx also low ct VRE              Medications:       amitriptyline  100 mg Oral At Bedtime     atorvastatin  10 mg Oral Daily     baclofen  10 mg Oral TID     ciprofloxacin  400 mg Intravenous Q12H     furosemide  40 mg Oral Daily     gabapentin  600 mg Oral TID     heparin  7,500 Units Subcutaneous Q12H     insulin aspart  1-6 Units Subcutaneous Q4H     polyethylene glycol  17 g Oral Daily     potassium chloride ER  20 mEq Oral Daily     senna-docusate  3 tablet Oral BID     sodium chloride (PF)  10 mL Intracatheter Q7 Days     vitamin D3  1,000 Units Oral Daily                  Physical Exam:   Blood pressure 113/56, pulse 74, temperature 97.4  F (36.3  C), temperature source Oral, resp. rate 14, height 1.702 m (5' 7\"), weight 123.3 kg (271 lb 12.8 oz), last menstrual period 12/11/2011, SpO2 95 %, not currently breastfeeding.  Wt Readings from Last 2 Encounters:   03/19/19 123.3 kg (271 lb 12.8 oz)   03/15/19 112.9 kg (249 lb)     Vital Signs with Ranges  Temp:  [95.9  F (35.5  C)-98  F (36.7  C)] 97.4  F (36.3  C)  Heart Rate:  [75-83] 79  Resp:  [14-18] 14  BP: " (113-126)/(49-56) 113/56  SpO2:  [90 %-97 %] 95 %    Constitutional: Awake, alert, cooperative, no apparent distress cognition Ok   Lungs: Clear to auscultation bilaterally, no crackles or wheezing   Cardiovascular: Regular rate and rhythm, normal S1 and S2, and no murmur noted   Abdomen: Normal bowel sounds, soft, non-distended, non-tender   Skin: No rashes, no cyanosis, no edema   Other:           Data:   All microbiology laboratory data reviewed.  Recent Labs   Lab Test 03/19/19  0626 03/18/19  0625 03/17/19  0615   WBC 16.9* 21.6* 31.1*   HGB 8.7* 8.2* 9.4*   HCT 29.4* 26.6* 31.0*   MCV 88 87 87    204 273     Recent Labs   Lab Test 03/19/19  0626 03/18/19  0625 03/17/19  0615   CR 0.58 0.58 0.96     Recent Labs   Lab Test 11/01/17  1623   SED 39*     Recent Labs   Lab Test 03/16/19  1550 03/16/19  1455 03/16/19  1222 03/16/19  1200 02/04/19  2130 01/31/19  0410 01/30/19  2120 01/30/19  2016 01/30/19  1522   CULT No growth after 3 days Culture in progress 50,000 to 100,000 colonies/mL  Pseudomonas aeruginosa  *  <10,000 colonies/mL  Enterococcus faecium (VRE)  *  Critical Value/Significant Value, preliminary result only, called to and read back by  Merline Bryan RN at [facility] [id number] at 2:30pm 3/18/2019 ()   No growth after 3 days Cultured on the 2nd day of incubation:  Staphylococcus epidermidis  *  Critical Value/Significant Value, preliminary result only, called to and read back by  Claudia Marsh RN [id number] 2/6/19. MS    (Note)  POSITIVE for STAPHYLOCOCCUS EPIDERMIDIS and POSITIVE for the mecA  gene (resistant to methicillin) by [facility] multiplex nucleic acid  test. Final identification and antimicrobial susceptibility testing  will be verified by standard methods.      Specimen tested with Verigene multiplex, gram-positive blood culture  nucleic acid test for the following targets: Staph aureus, Staph  epidermidis, Staph lugdunensis, other Staph species, Enterococcus  faecalis, Enterococcus  faecium, Streptococcus species, S. agalactiae,  S. anginosus grp., S. pneumoniae, S. pyogenes, Listeria sp., mecA  (methicillin resistance) and Randolph/B (vancomycin resistance).      Critical Value/Significant Value called to and read back by FREDO ESPINO 02.06.2019 AT 7.43AM,ZG    No growth Moderate growth  Normal geovanna   Light growth  Escherichia coli  *  Moderate growth  beta hemolytic   Streptococcus constellatus  *  Moderate growth  Normal skin geovanna    Moderate growth  Normal skin geovanna    Moderate growth  beta hemolytic   Streptococcus constellatus  Susceptibility testing done on previous specimen  * 50,000 to 100,000 colonies/mL  Lactose fermenting gram negative rods  *  10,000 to 50,000 colonies/mL  Strain 2  Lactose fermenting gram negative rods  *  <10,000 colonies/mL  Strain 3  Lactose fermenting gram negative rods  *  <10,000 colonies/mL  Strain 4  Lactose fermenting gram negative rods  *  Susceptibility testing not routinely done Cultured on the 1st day of incubation:  Escherichia coli  Susceptibility testing done on previous specimen  *  Critical Value/Significant Value, preliminary result only, called to and read back by  Beronica Woods  RN @[id number] 01/31/19 gd

## 2019-03-19 NOTE — PROGRESS NOTES
[facility] Urology Progress Note          Assessment and Plan:   Impression:    53 year old female with apparent urosepsis, POD 4 s/p left ureteroscopy and stone treatment with Dr. Chauhan. Improving.       Plan:    - Appreciate input from ID and care from the Hospitalist Service. Plan for transition PO abx soon.       Lori Sen PA-C  [facility]  [phone]               Interval History:   Doing better; extubated.               Review of Systems:   The 5 point Review of Systems is negative other than noted in the HPI             Medications:     Current Facility-Administered Medications Ordered in Epic   Medication Dose Route Frequency Last Rate Last Dose     acetaminophen (TYLENOL) solution 650 mg  650 mg Oral Q4H PRN   650 mg at 03/19/19 0628     amitriptyline (ELAVIL) tablet 100 mg  100 mg Oral At Bedtime   100 mg at 03/18/19 2148     atorvastatin (LIPITOR) tablet 10 mg  10 mg Oral Daily   10 mg at 03/19/19 0855     baclofen (LIORESAL) tablet 10 mg  10 mg Oral TID   10 mg at 03/19/19 0854     baclofen (LIORESAL) tablet 10 mg  10 mg Oral Daily PRN   10 mg at 03/17/19 1444     bisacodyl (DULCOLAX) Suppository 10 mg  10 mg Rectal Daily PRN         ciprofloxacin (CIPRO) infusion 400 mg  400 mg Intravenous Q12H 200 mL/hr at 03/19/19 0847 400 mg at 03/19/19 0847     glucose gel 15-30 g  15-30 g Oral Q15 Min PRN        Or     dextrose 50 % injection 25-50 mL  25-50 mL Intravenous Q15 Min PRN        Or     glucagon injection 1 mg  1 mg Subcutaneous Q15 Min PRN         docusate sodium (COLACE) capsule 100 mg  100 mg Oral BID PRN   100 mg at 03/17/19 2206     furosemide (LASIX) tablet 40 mg  40 mg Oral Daily   40 mg at 03/19/19 0856     gabapentin (NEURONTIN) tablet 600 mg  600 mg Oral TID   600 mg at 03/19/19 0856     heparin sodium injection 7,500 Units  7,500 Units Subcutaneous Q12H   7,500 Units at 03/19/19 0851     insulin aspart (NovoLOG) inj (RAPID ACTING)  1-6 Units Subcutaneous Q4H          lactated ringers BOLUS 500 mL  500 mL Intravenous Once PRN         lidocaine 1 % 0.5-5 mL  0.5-5 mL Other Once PRN         magnesium sulfate 4 g in 100 mL sterile water (premade)  4 g Intravenous Q4H PRN         Medication given by intrathecal pump: This is NOT an order to dispense medication. For information only.   Does not apply Continuous PRN         melatonin tablet 1 mg  1 mg Oral At Bedtime PRN         naloxone (NARCAN) injection 0.1-0.4 mg  0.1-0.4 mg Intravenous Q2 Min PRN         ondansetron (ZOFRAN-ODT) ODT tab 4 mg  4 mg Oral Q6H PRN        Or     ondansetron (ZOFRAN) injection 4 mg  4 mg Intravenous Q6H PRN   4 mg at 03/17/19 2209     oxyCODONE (ROXICODONE) tablet 15 mg  15 mg Oral Q4H PRN   15 mg at 03/19/19 0957     polyethylene glycol (MIRALAX/GLYCOLAX) Packet 17 g  17 g Oral Daily   17 g at 03/19/19 0854     polyethylene glycol (MIRALAX/GLYCOLAX) Packet 17 g  17 g Oral BID PRN         potassium chloride (KLOR-CON) Packet 20-40 mEq  20-40 mEq Oral or Feeding Tube Q2H PRN   20 mEq at 03/18/19 0942     potassium chloride 10 mEq in 100 mL intermittent infusion with 10 mg lidocaine  10 mEq Intravenous Q1H PRN         potassium chloride 10 mEq in 100 mL sterile water intermittent infusion (premix)  10 mEq Intravenous Q1H PRN         potassium chloride 20 mEq in 50 mL intermittent infusion  20 mEq Intravenous Q1H PRN         potassium chloride ER (K-DUR/KLOR-CON M) CR tablet 20 mEq  20 mEq Oral Daily   20 mEq at 03/19/19 0856     potassium chloride ER (K-DUR/KLOR-CON M) CR tablet 20-40 mEq  20-40 mEq Oral Q2H PRN         senna-docusate (SENOKOT-S/PERICOLACE) 8.6-50 MG per tablet 3 tablet  3 tablet Oral BID   3 tablet at 03/19/19 0958     sodium chloride (PF) 0.9% PF flush 10 mL  10 mL Intracatheter Q7 Days   10 mL at 03/16/19 1548     sodium chloride (PF) 0.9% PF flush 10-20 mL  10-20 mL Intracatheter q1 min prn   10 mL at 03/19/19 1005     sodium chloride 0.9% infusion   Intravenous Continuous    Stopped at 03/18/19 1301     vitamin D3 (CHOLECALCIFEROL) 1000 units (25 mcg) tablet 1,000 Units  1,000 Units Oral Daily   1,000 Units at 03/19/19 0855     No current [facility]-ordered outpatient medications on file.                  Physical Exam:   Vitals were reviewed  Patient Vitals for the past 8 hrs:   BP Temp Temp src Heart Rate Resp SpO2 Weight   03/19/19 0804 113/56 97.4  F (36.3  C) Oral 79 14 95 % --   03/19/19 0441 -- -- -- -- -- -- 123.3 kg (271 lb 12.8 oz)     GEN: NAD, resting in chair, arouses easily.   EYES: EOMI  MOUTH: MMM  NECK: Supple  RESP: Unlabored breathing  ABD: soft           Data:   No results found for: NTBNPI, NTBNP  Lab Results   Component Value Date    WBC 16.9 (H) 03/19/2019    WBC 21.6 (H) 03/18/2019    WBC 31.1 (H) 03/17/2019    HGB 8.7 (L) 03/19/2019    HGB 8.2 (L) 03/18/2019    HGB 9.4 (L) 03/17/2019    HCT 29.4 (L) 03/19/2019    HCT 26.6 (L) 03/18/2019    HCT 31.0 (L) 03/17/2019    MCV 88 03/19/2019    MCV 87 03/18/2019    MCV 87 03/17/2019     03/19/2019     03/18/2019     03/17/2019     Lab Results   Component Value Date    INR 0.93 10/03/2013

## 2019-03-20 ENCOUNTER — APPOINTMENT (OUTPATIENT)
Dept: PHYSICAL THERAPY | Facility: CLINIC | Age: 54
DRG: 856 | End: 2019-03-20
Attending: INTERNAL MEDICINE
Payer: COMMERCIAL

## 2019-03-20 ENCOUNTER — APPOINTMENT (OUTPATIENT)
Dept: OCCUPATIONAL THERAPY | Facility: CLINIC | Age: 54
DRG: 856 | End: 2019-03-20
Attending: INTERNAL MEDICINE
Payer: COMMERCIAL

## 2019-03-20 LAB
ANION GAP SERPL CALCULATED.3IONS-SCNC: 2 MMOL/L (ref 3–14)
BACTERIA SPEC CULT: ABNORMAL
BACTERIA SPEC CULT: ABNORMAL
BASOPHILS # BLD AUTO: 0.1 10E9/L (ref 0–0.2)
BASOPHILS NFR BLD AUTO: 0.7 %
BUN SERPL-MCNC: 10 MG/DL (ref 7–30)
CALCIUM SERPL-MCNC: 8.4 MG/DL (ref 8.5–10.1)
CHLORIDE SERPL-SCNC: 100 MMOL/L (ref 94–109)
CO2 SERPL-SCNC: 37 MMOL/L (ref 20–32)
CREAT SERPL-MCNC: 0.64 MG/DL (ref 0.52–1.04)
DIFFERENTIAL METHOD BLD: ABNORMAL
EOSINOPHIL # BLD AUTO: 0.6 10E9/L (ref 0–0.7)
EOSINOPHIL NFR BLD AUTO: 5.1 %
ERYTHROCYTE [DISTWIDTH] IN BLOOD BY AUTOMATED COUNT: 17.7 % (ref 10–15)
GFR SERPL CREATININE-BSD FRML MDRD: >90 ML/MIN/{1.73_M2}
GLUCOSE BLDC GLUCOMTR-MCNC: 118 MG/DL (ref 70–99)
GLUCOSE BLDC GLUCOMTR-MCNC: 130 MG/DL (ref 70–99)
GLUCOSE BLDC GLUCOMTR-MCNC: 96 MG/DL (ref 70–99)
GLUCOSE BLDC GLUCOMTR-MCNC: 97 MG/DL (ref 70–99)
GLUCOSE SERPL-MCNC: 98 MG/DL (ref 70–99)
HCT VFR BLD AUTO: 29.3 % (ref 35–47)
HGB BLD-MCNC: 9 G/DL (ref 11.7–15.7)
IMM GRANULOCYTES # BLD: 0.4 10E9/L (ref 0–0.4)
IMM GRANULOCYTES NFR BLD: 3.3 %
LYMPHOCYTES # BLD AUTO: 2.3 10E9/L (ref 0.8–5.3)
LYMPHOCYTES NFR BLD AUTO: 19.5 %
MCH RBC QN AUTO: 26.5 PG (ref 26.5–33)
MCHC RBC AUTO-ENTMCNC: 30.7 G/DL (ref 31.5–36.5)
MCV RBC AUTO: 86 FL (ref 78–100)
MONOCYTES # BLD AUTO: 0.8 10E9/L (ref 0–1.3)
MONOCYTES NFR BLD AUTO: 6.8 %
NEUTROPHILS # BLD AUTO: 7.7 10E9/L (ref 1.6–8.3)
NEUTROPHILS NFR BLD AUTO: 64.6 %
NRBC # BLD AUTO: 0 10*3/UL
NRBC BLD AUTO-RTO: 0 /100
PLATELET # BLD AUTO: 248 10E9/L (ref 150–450)
POTASSIUM SERPL-SCNC: 3.5 MMOL/L (ref 3.4–5.3)
RBC # BLD AUTO: 3.4 10E12/L (ref 3.8–5.2)
SODIUM SERPL-SCNC: 139 MMOL/L (ref 133–144)
SPECIMEN SOURCE: ABNORMAL
WBC # BLD AUTO: 11.9 10E9/L (ref 4–11)

## 2019-03-20 PROCEDURE — 25000128 H RX IP 250 OP 636: Performed by: INTERNAL MEDICINE

## 2019-03-20 PROCEDURE — 25800030 ZZH RX IP 258 OP 636: Performed by: INTERNAL MEDICINE

## 2019-03-20 PROCEDURE — 00000146 ZZHCL STATISTIC GLUCOSE BY METER IP

## 2019-03-20 PROCEDURE — 97161 PT EVAL LOW COMPLEX 20 MIN: CPT | Mod: GP

## 2019-03-20 PROCEDURE — 25000132 ZZH RX MED GY IP 250 OP 250 PS 637: Performed by: INTERNAL MEDICINE

## 2019-03-20 PROCEDURE — 12000000 ZZH R&B MED SURG/OB

## 2019-03-20 PROCEDURE — 85025 COMPLETE CBC W/AUTO DIFF WBC: CPT | Performed by: INTERNAL MEDICINE

## 2019-03-20 PROCEDURE — 80048 BASIC METABOLIC PNL TOTAL CA: CPT | Performed by: INTERNAL MEDICINE

## 2019-03-20 PROCEDURE — 97110 THERAPEUTIC EXERCISES: CPT | Mod: GO | Performed by: REHABILITATION PRACTITIONER

## 2019-03-20 PROCEDURE — 97530 THERAPEUTIC ACTIVITIES: CPT | Mod: GP

## 2019-03-20 PROCEDURE — 97110 THERAPEUTIC EXERCISES: CPT | Mod: GP

## 2019-03-20 PROCEDURE — 97165 OT EVAL LOW COMPLEX 30 MIN: CPT | Mod: GO | Performed by: REHABILITATION PRACTITIONER

## 2019-03-20 PROCEDURE — 99232 SBSQ HOSP IP/OBS MODERATE 35: CPT | Performed by: INTERNAL MEDICINE

## 2019-03-20 RX ORDER — CIPROFLOXACIN 500 MG/1
500 TABLET, FILM COATED ORAL EVERY 12 HOURS SCHEDULED
Status: DISCONTINUED | OUTPATIENT
Start: 2019-03-20 | End: 2019-03-26 | Stop reason: HOSPADM

## 2019-03-20 RX ADMIN — HEPARIN SODIUM 7500 UNITS: 5000 INJECTION, SOLUTION INTRAVENOUS; SUBCUTANEOUS at 19:49

## 2019-03-20 RX ADMIN — OXYCODONE HYDROCHLORIDE 15 MG: 5 TABLET ORAL at 07:01

## 2019-03-20 RX ADMIN — GABAPENTIN 600 MG: 600 TABLET, FILM COATED ORAL at 17:10

## 2019-03-20 RX ADMIN — ATORVASTATIN CALCIUM 10 MG: 10 TABLET, FILM COATED ORAL at 08:17

## 2019-03-20 RX ADMIN — BACLOFEN 10 MG: 10 TABLET ORAL at 21:54

## 2019-03-20 RX ADMIN — FUROSEMIDE 40 MG: 40 TABLET ORAL at 08:18

## 2019-03-20 RX ADMIN — SENNOSIDES AND DOCUSATE SODIUM 3 TABLET: 8.6; 5 TABLET ORAL at 08:17

## 2019-03-20 RX ADMIN — VITAMIN D, TAB 1000IU (100/BT) 1000 UNITS: 25 TAB at 08:17

## 2019-03-20 RX ADMIN — SENNOSIDES AND DOCUSATE SODIUM 3 TABLET: 8.6; 5 TABLET ORAL at 21:55

## 2019-03-20 RX ADMIN — GABAPENTIN 600 MG: 600 TABLET, FILM COATED ORAL at 08:16

## 2019-03-20 RX ADMIN — AMITRIPTYLINE HYDROCHLORIDE 100 MG: 50 TABLET, FILM COATED ORAL at 21:55

## 2019-03-20 RX ADMIN — OXYCODONE HYDROCHLORIDE 15 MG: 5 TABLET ORAL at 02:05

## 2019-03-20 RX ADMIN — GABAPENTIN 600 MG: 600 TABLET, FILM COATED ORAL at 21:55

## 2019-03-20 RX ADMIN — CIPROFLOXACIN HYDROCHLORIDE 500 MG: 500 TABLET, FILM COATED ORAL at 19:50

## 2019-03-20 RX ADMIN — POTASSIUM CHLORIDE 20 MEQ: 1500 TABLET, EXTENDED RELEASE ORAL at 08:19

## 2019-03-20 RX ADMIN — BACLOFEN 10 MG: 10 TABLET ORAL at 08:18

## 2019-03-20 RX ADMIN — OXYCODONE HYDROCHLORIDE 15 MG: 5 TABLET ORAL at 21:54

## 2019-03-20 RX ADMIN — HEPARIN SODIUM 7500 UNITS: 5000 INJECTION, SOLUTION INTRAVENOUS; SUBCUTANEOUS at 08:14

## 2019-03-20 RX ADMIN — OXYCODONE HYDROCHLORIDE 15 MG: 5 TABLET ORAL at 17:11

## 2019-03-20 RX ADMIN — POLYETHYLENE GLYCOL 3350 17 G: 17 POWDER, FOR SOLUTION ORAL at 08:23

## 2019-03-20 RX ADMIN — OXYCODONE HYDROCHLORIDE 15 MG: 5 TABLET ORAL at 11:53

## 2019-03-20 RX ADMIN — BACLOFEN 10 MG: 10 TABLET ORAL at 17:10

## 2019-03-20 RX ADMIN — CIPROFLOXACIN 400 MG: 2 INJECTION, SOLUTION INTRAVENOUS at 08:26

## 2019-03-20 RX ADMIN — DAPTOMYCIN 500 MG: 500 INJECTION, POWDER, LYOPHILIZED, FOR SOLUTION INTRAVENOUS at 14:17

## 2019-03-20 ASSESSMENT — ACTIVITIES OF DAILY LIVING (ADL)
ADLS_ACUITY_SCORE: 23
ADLS_ACUITY_SCORE: 30
ADLS_ACUITY_SCORE: 23
ADLS_ACUITY_SCORE: 23

## 2019-03-20 NOTE — CONSULTS
Care Transition Initial Assessment - SW     Met with: Patient  And spouse Fernando  Active Problems:    Sepsis (H)       DATA  Lives With: spouse - admitted from TCU.. Pt lives in a home with spouse  Has been able to care for self prior to wilmer admission.      Quality of Family Relationships: involved, supportive  Identified issues/concerns regarding health management: Has H/O MS. Was able to Pivot self and care for self home alone during the day.    @   Resources List: Skilled Nursing Facility  Admitted from TCU due to infection following a procedure.      Quality of Family Relationships: involved, supportive       ASSESSMENT  Cognitive Status:  awake, alert and oriented  Concerns to be addressed: Met with pt and spouse. PT is well below her baseline of being able to self transfer at home.. Pt has a W/C., walk in shower and shower chair. He is able to cook simple meals and manage her meds.  PT and spouse both agree that she will need to return to TCU once stable  Referral will be sent out. Pt has [employer] and will need prior auth again     PLAN  Spoke with Md and requested PT/OT eval. Will need both due to his insurance needs  Will send to [facility] and [facility]'s once PT/OT evals have been completed.

## 2019-03-20 NOTE — PLAN OF CARE
Up in chair with lift --- appetite good ---taking fluids in well---legs remain edematous ---- using IS  well -- weaning off O2 -- O2 on at 1 L 91 - 93% while sitting in chair-- continue to monitor

## 2019-03-20 NOTE — PLAN OF CARE
Assumed care at 1900.  Pt A&O x4, Picc SL to the LUE, up with lift, wheelchair bound at baseline.  Makes needs know. HS .

## 2019-03-20 NOTE — PROGRESS NOTES
[facility]    Hospitalist Progress Note      Assessment & Plan   Amanda Richardson is a 53 year old female who was admitted on 3/16/2019.  Past medical history is complex with frequent hospitalizations.  Recently discharged 2/15/19 after a 2 week hospitalization for septic shock and respiratory failure (discharged at that time on 3L home O2).  Patient subsequently underwent outpatient urologic procedure 3/15/19 with lithotripsy and left ureteral stent exchange with Dr. Chauhan under general anesthesia.  Patient remained lethargic following procedure.  Patient was ultimately hypoxic (72% on room air) at TCU the day following the procedure and taken to the emergency department where she was ultimately found to be septic and emergently intubated.  Patient admitted to the ICU for further evaluation and treatment.    Septic shock secondary to ureteral stone with lithotripsy and left ureteral stent exchange   Patient initially required low-dose levophed to maintain blood pressure.  Initially treated with vancomycin and Zosyn.  Urine culture growing ,000 Pseudomonas which is pansensitive.  Infectious disease following.  IV cipro changed to PO cipro BID.   Given additional finding of VRE, daptomycin has been started as well.  Appreciate infectious disease recommendations.  Continue Bobo for maximal urinary decompression.  Urology plans to take out stent next week, though urology plans to continue to follow to ensure patient recovers adequately.    Acute respiratory failure secondary to severe sepsis  Metabolic encephalopathy secondary to sepsis  Hypoxic and hypercapneic respiratory failure  Patient required emergent intubation in the ER and was extubated 3/17/19.  Continue to have increased O2 requirements at night (up to 4L) and often able to wean to 1L during the day.   Will repeat AM VBG.   Note that patient required BiPAP and oxygen during recent hospitalization.  Patient was actually discharged on  3 L by nasal cannula to TCU.  She states that this was weaned down while she was there and she was tolerating room air well.  Patient became hypoxic the day after recent procedure.  Explained to repeat VBG in the morning as above.  Suspect that patient has some degree of obesity hypoventilation and would benefit from outpatient sleep study with nocturnal CPAP.  Continue to encourage incentive spirometer.    History of hypertension  PTA medications on hold given septic shock on admission.  Patient with relatively well controlled blood pressures.  Will continue to follow closely.    Acute kidney injury, resolved  Renal function has improved to baseline.  Patient with good urine output.  Restarted PTA Lasix 40 mg p.o. daily.  Renally dose medications and avoid nephrotoxins.    History of multiple sclerosis  Chronic pain  Resume baclofen, PTA narcotics, PTA Neurontin, PTA amitriptyline.   As long as patient discharges in the coming few days she will have enough baclofen and baclofen pump.  Discussed with pharmacy and there should be enough baclofen and reservoir to last at least until 4/5/19.    Type 2 diabetes mellitus  Hemoglobin A1c 6.5%.  PTA metformin on hold.  Diabetic diet.  Before meals at bedtime glucose checks with sliding scale insulin.    Hyperlipidemia  Continue PTA statin.    DVT Prophylaxis: subQ heparin  Code Status: Full Code  Expected discharge: Anticipate hospitalization at least 1-2 more days with IV daptomycin.     Yanna Echavarria MD FACP  Hospitalist Service  [facility]  Text Page (7am - 6pm)      Interval History   Nursing notes continues to require increased oxygen overnight and then able to wean during the day.  Otherwise no acute events.  She without additional complaints.      -Data reviewed today: I reviewed all new labs and imaging results over the last 24 hours.      Physical Exam   Temp: 99.2  F (37.3  C) Temp src: Oral BP: 122/54 Pulse: 92 Heart Rate: 96 Resp: 18 SpO2: 91  % O2 Device: Nasal cannula Oxygen Delivery: 1 LPM  Vitals:    03/16/19 1530 03/17/19 0300 03/19/19 0441   Weight: 119.6 kg (263 lb 10.7 oz) 122.4 kg (269 lb 13.5 oz) 123.3 kg (271 lb 12.8 oz)     Vital Signs with Ranges  Temp:  [98  F (36.7  C)-100.5  F (38.1  C)] 99.2  F (37.3  C)  Pulse:  [92-97] 92  Heart Rate:  [87-96] 96  Resp:  [18] 18  BP: (122-152)/(54-65) 122/54  SpO2:  [91 %-100 %] 91 %  I/O last 3 completed shifts:  In: 2000 [P.O.:2000]  Out: 6150 [Urine:6150]    Constitutional: Morbidly obese. Alert and oriented x3. No acute distress. Sitting up in chair watching TV. Patient non-toxic.   HEENT: NCAT. EOMI. Moist oral mucosa.   Respiratory: Clear to auscultation bilaterally, though diminished throughout. No crackles or wheezes.  1 L O2 by nasal cannula  Cardiovascular: Regular rate and rhythm. No murmur.  GI: Morbidly obese. Soft, nontender, nondistended.   Musculoskeletal: Chronic venous stasis changes bilaterally with 2+ edema. Otherwise no gross deformities. Generalized weakness throughout.  Neurologic: Alert and oriented x3. No focal neurologic deficits.       Medications     Medication given by intrathecal pump: This is NOT an order to dispense medication. For information only.       sodium chloride Stopped (03/18/19 1301)       amitriptyline  100 mg Oral At Bedtime     atorvastatin  10 mg Oral Daily     baclofen  10 mg Oral TID     ciprofloxacin  500 mg Oral Q12H JOSE     DAPTOmycin (CUBICIN) intermittent infusion  4 mg/kg Intravenous Q24H     furosemide  40 mg Oral Daily     gabapentin  600 mg Oral TID     heparin  7,500 Units Subcutaneous Q12H     insulin aspart  1-7 Units Subcutaneous TID AC     insulin aspart  1-5 Units Subcutaneous At Bedtime     polyethylene glycol  17 g Oral Daily     potassium chloride ER  20 mEq Oral Daily     senna-docusate  3 tablet Oral BID     sodium chloride (PF)  10 mL Intracatheter Q7 Days     vitamin D3  1,000 Units Oral Daily       Data   Recent Labs   Lab  03/20/19  0545 03/19/19  0626 03/18/19  1530 03/18/19  0625 03/17/19  0615 03/16/19  1725 03/16/19  1200   WBC 11.9* 16.9*  --  21.6* 31.1*  --  38.5*   HGB 9.0* 8.7*  --  8.2* 9.4*  --  9.8*   MCV 86 88  --  87 87  --  88    249  --  204 273  --  321    139  --  141 139  --  133   POTASSIUM 3.5 3.8 3.7 3.2* 3.4  --  4.8   CHLORIDE 100 101  --  103 104  --  98   CO2 37* 36*  --  34* 32  --  29   BUN 10 9  --  13 22  --  31*   CR 0.64 0.58  --  0.58 0.96  --  1.64*   ANIONGAP 2* 2*  --  4 3  --  6   MARY 8.4* 8.2*  --  8.2* 7.7*  --  7.7*   GLC 98 105*  --  105* 120*  --  123*   ALBUMIN  --   --   --   --  2.1*  --  2.3*   PROTTOTAL  --   --   --   --  6.9  --  7.3   BILITOTAL  --   --   --   --  0.9  --  0.7   ALKPHOS  --   --   --   --  170*  --  171*   ALT  --   --   --   --  29  --  28   AST  --   --   --   --  36  --  31   TROPI  --   --   --   --   --  0.061* 0.050*       No results found for this or any previous visit (from the past 24 hour(s)).     Head atraumatic, normal cephalic shape.

## 2019-03-20 NOTE — PROGRESS NOTES
03/20/19 1400   Quick Adds   Type of Visit Initial PT Evaluation   Living Environment   Lives With spouse   Living Arrangements house   Home Accessibility wheelchair accessible   Transportation Anticipated family or friend will provide   Self-Care   Usual Activity Tolerance fair   Current Activity Tolerance poor   Equipment Currently Used at Home commode;grab bar, tub/shower;shower chair;wheelchair, manual  (lift chair, Piotr lift, reacher, hand held shower head)   Activity/Exercise/Self-Care Comment Pt at TCU PTA was working on standing frame, slide board transfers and transition to SPT.   Functional Level Prior   Ambulation 4-->completely dependent  (At baseline propels wc with bilat UE)   Transferring 0-->independent  (Stand-pivot transfer)   General Information   Onset of Illness/Injury or Date of Surgery - Date 03/16/19   Referring Physician Donn DAMON   Patient/Family Goals Statement To be able stand-pivot transfer to get into the car.    Pertinent History of Current Problem (include personal factors and/or comorbidities that impact the POC) 53 year old female admitted septic shock, acute repsiratory failure and metabolic encephalopathy.    Precautions/Limitations fall precautions   Cognitive Status Examination   Orientation orientation to person, place and time   Level of Consciousness alert   Memory intact   Pain Assessment   Patient Currently in Pain No   Posture    Posture Forward head position   Range of Motion (ROM)   ROM Comment Decreased R knee extension, knee flexion and ankle AROM. Pt 2/5 in most planes.    Strength   Strength Comments Decreased generally. Needs physical assist with mobility as noted below.    Transfer Skills   Transfer Comments Sit to stand from elevated bed height and mod A x 2.    Balance   Balance Comments Good static seated balance. Needs assist for safe static half- stand balance   General Therapy Interventions   Planned Therapy Interventions bed mobility  "training;strengthening;transfer training;progressive activity/exercise   Clinical Impression   Criteria for Skilled Therapeutic Intervention yes, treatment indicated   PT Diagnosis Generalized weakness   Influenced by the following impairments Decreased IND with functional transfers.    Functional limitations due to impairments Weakness, impaired balance, decreased R LE strength   Clinical Presentation Stable/Uncomplicated   Clinical Presentation Rationale Medically stable.    Clinical Decision Making (Complexity) Low complexity   Therapy Frequency` 5 times/week   Predicted Duration of Therapy Intervention (days/wks) One week   Anticipated Discharge Disposition Transitional Care Facility   Risk & Benefits of therapy have been explained Yes   Patient, Family & other staff in agreement with plan of care Yes   [facility] TM \"6 Clicks\"   2016, Trustees of [facility], under license to [employer].  All rights reserved.   6 Clicks Short Forms Basic Mobility Inpatient Short Form   [facility]  \"6 Clicks\" V.2 Basic Mobility Inpatient Short Form   1. Turning from your back to your side while in a flat bed without using bedrails? 2 - A Lot   2. Moving from lying on your back to sitting on the side of a flat bed without using bedrails? 2 - A Lot   3. Moving to and from a bed to a chair (including a wheelchair)? 1 - Total   4. Standing up from a chair using your arms (e.g., wheelchair, or bedside chair)? 2 - A Lot   5. To walk in hospital room? 1 - Total   6. Climbing 3-5 steps with a railing? 1 - Total   Basic Mobility Raw Score (Score out of 24.Lower scores equate to lower levels of function) 9   Total Evaluation Time   Total Evaluation Time (Minutes) 5     "

## 2019-03-20 NOTE — PROGRESS NOTES
03/20/19 1100   Quick Adds   Type of Visit Initial Occupational Therapy Evaluation   Living Environment   Lives With spouse   Living Arrangements house   Home Accessibility wheelchair accessible   Transportation Anticipated family or friend will provide   Living Environment Comment Pt lives in a split level home with a ramp to enter and stair lift to main level.     Self-Care   Usual Activity Tolerance moderate   Regular Exercise (pt has done some stretching in the past)   Equipment Currently Used at Home commode;grab bar, tub/shower;shower chair;wheelchair, manual  (lift chair, Piotr lift, reacher, hand held shower head)   Activity/Exercise/Self-Care Comment pt has done some stretching in the past. Spouse plans to purchase a pivot disc   Functional Level   Ambulation 4-->completely dependent  (typically propels wc with B UE)   Transferring 0-->independent  (at baseline I iwth STP transfers)   Toileting 1-->assistive equipment   Bathing 1-->assistive equipment  (spouse present, helps w/set-up, )   Dressing 1-->assistive equipment   Eating 0-->independent   Communication 0-->understands/communicates without difficulty   Swallowing 0-->swallows foods/liquids without difficulty   Cognition 0 - no cognition issues reported   Fall history within last six months no   Which of the above functional risks had a recent onset or change? transferring;toileting;bathing;dressing   Prior Functional Level Comment Pt at TCU PTA was working on standing frame, slide board transfers and transition to [facility].   General Information   Onset of Illness/Injury or Date of Surgery - Date 03/16/19   Referring Physician Donn DAMON   Patient/Family Goals Statement return to TCU   Additional Occupational Profile Info/Pertinent History of Current Problem 53 year old female admitted septic shock, acute repsiratory failure and metabolic encephalopathy.    Precautions/Limitations fall precautions   Cognitive Status Examination   Orientation  orientation to person, place and time   Level of Consciousness alert   Follows Commands (Cognition) WNL   Memory intact   Visual Perception   Visual Perception Wears glasses   Pain Assessment   Patient Currently in Pain (6/10 chronic pain)   Range of Motion (ROM)   ROM Quick Adds No deficits were identified   Strength   Strength Comments decreased B UE strength, weaker shoulder compared to distal motions   Hand Strength   Hand Strength Comments intact, strong  B   Muscle Tone Assessment   Muscle Tone Quick Adds No deficits were identified   Coordination   Upper Extremity Coordination No deficits were identified   Transfer Skills   Transfer Comments defer to PT   Grooming   Level of [place]: Grooming (after set-up, demonstrates skills to complete)   Eating/Self Feeding   Level of [place]: Eating independent   Instrumental Activities of Daily Living (IADL)   IADL Comments Spouse/son assist with IADLs   Activities of Daily Living Analysis   Impairments Contributing to Impaired Activities of Daily Living balance impaired;strength decreased   General Therapy Interventions   Planned Therapy Interventions progressive activity/exercise;strengthening   Clinical Impression   Criteria for Skilled Therapeutic Interventions Met yes, treatment indicated   OT Diagnosis Impaired ADLs, mobility tasks   Influenced by the following impairments Decreased functional activity tolerance and strength, impaired balance, pain   Assessment of Occupational Performance 5 or more Performance Deficits   Identified Performance Deficits Bathing, dressing, grooming, toileting, transfers., household chores (was able to complete ffrom wc at home)   Clinical Decision Making (Complexity) Low complexity   Therapy Frequency 3 times/wk   Predicted Duration of Therapy Intervention (days/wks) 5 days   Anticipated Discharge Disposition Transitional Care Facility   Risks and Benefits of Treatment have been explained. Yes   Patient, Family & other  "staff in agreement with plan of care Yes   [facility] AM-PAC  \"6 Clicks\" Daily Activity Inpatient Short Form   1. Putting on and taking off regular lower body clothing? 2 - A Lot   2. Bathing (including washing, rinsing, drying)? 1 - Total   3. Toileting, which includes using toilet, bedpan or urinal? 1 - Total   4. Putting on and taking off regular upper body clothing? 3 - A Little   5. Taking care of personal grooming such as brushing teeth? 3 - A Little   6. Eating meals? 4 - None   Daily Activity Raw Score (Score out of 24.Lower scores equate to lower levels of function) 14   Total Evaluation Time   Total Evaluation Time (Minutes) 15     "

## 2019-03-20 NOTE — PLAN OF CARE
OT- eval completed and treatment initiated. Patient is a 53 year old female admitted septic shock, acute repsiratory failure and metabolic encephalopathy. Patient was admitted from TCU were she was making progress in PT and OT in standing tolerance, strengthening and ADL's.     Discharge Planner OT   Patient plan for discharge: TCU  Current status: Patient was in chair and awaiting PT eval to assess mobility, therefore mobility not initiated. Addressed POC and role of OT and importance of continuing with B UE to aid in transfers, patient in agreement. Patient declined offer for OT to provided theraband and exercises, patient declined and reported she would have her spouse bring hers in to use.  Barriers to return to prior living situation: Pt mod I with squat pivot transfers at baseline  Recommendations for discharge: Return to TCU  Rationale for recommendations: Pt would benefit from returning to TCU to continue to address strength deficits to maximize IND with mobility and ADL's prior to returning home. Will continue to follow 3x/week while IP.        Entered by: Montserrat Jeff 03/20/2019 3:18 PM

## 2019-03-20 NOTE — PROGRESS NOTES
"[facility]  Infectious Disease Progress Note          Assessment and Plan:     ASSESSMENT:  1. 54 yo female acute SEPTIC SHOCK-resolved,  BC negative, UC  With  P.aeruginosa,  Onset after  L ureteroscopy,  Doing well,  JULIANNE  Resolved, off pressors  2. UTI,  UROSEPSIS-Pseudomonas aeruginosa,   SUSY pansens  3. JULIANNE-Resolved  4.  OBESITY  5.  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 H/O T CELL  LYMPHOMA     REC  1.  Continue cipro , po as soon as any time,  Ok at 500 bid , plan 2 weeks total ABX, day 5/14 today  2 Would normally ignore VRE UC but with uriary manipulation give a bit of dapto(linezolid drug interactions so avoid), only a couple doses planned(while here any way)  3 Discussed VRE/iso issues in detail, MRSA + nares, no tx already iso        Interval History:   no new complaints feels better out of ICU; no cp, sob, n/v/d, or abd pain. Chronic R leg pain, no other pain site  cxs as above  cx also low ct VRE nares MRSA              Medications:       amitriptyline  100 mg Oral At Bedtime     atorvastatin  10 mg Oral Daily     baclofen  10 mg Oral TID     ciprofloxacin  400 mg Intravenous Q12H     DAPTOmycin (CUBICIN) intermittent infusion  4 mg/kg Intravenous Q24H     furosemide  40 mg Oral Daily     gabapentin  600 mg Oral TID     heparin  7,500 Units Subcutaneous Q12H     insulin aspart  1-7 Units Subcutaneous TID AC     insulin aspart  1-5 Units Subcutaneous At Bedtime     polyethylene glycol  17 g Oral Daily     potassium chloride ER  20 mEq Oral Daily     senna-docusate  3 tablet Oral BID     sodium chloride (PF)  10 mL Intracatheter Q7 Days     vitamin D3  1,000 Units Oral Daily                  Physical Exam:   Blood pressure 122/54, pulse 92, temperature 100  F (37.8  C), temperature source Oral, resp. rate 18, height 1.702 m (5' 7\"), weight 123.3 kg (271 lb 12.8 oz), last menstrual period 12/11/2011, SpO2 91 %, not currently breastfeeding.  Wt Readings from Last 2 " Encounters:   03/19/19 123.3 kg (271 lb 12.8 oz)   03/15/19 112.9 kg (249 lb)     Vital Signs with Ranges  Temp:  [98  F (36.7  C)-100.5  F (38.1  C)] 100  F (37.8  C)  Pulse:  [92-97] 92  Heart Rate:  [87-96] 96  Resp:  [18] 18  BP: (122-152)/(54-65) 122/54  SpO2:  [91 %-100 %] 91 %    Constitutional: Awake, alert, cooperative, no apparent distress cognition Ok   Lungs: Clear to auscultation bilaterally, no crackles or wheezing   Cardiovascular: Regular rate and rhythm, normal S1 and S2, and no murmur noted   Abdomen: Normal bowel sounds, soft, non-distended, non-tender   Skin: No rashes, no cyanosis, no edema   Other:           Data:   All microbiology laboratory data reviewed.  Recent Labs   Lab Test 03/20/19  0545 03/19/19  0626 03/18/19  0625   WBC 11.9* 16.9* 21.6*   HGB 9.0* 8.7* 8.2*   HCT 29.3* 29.4* 26.6*   MCV 86 88 87    249 204     Recent Labs   Lab Test 03/20/19  0545 03/19/19  0626 03/18/19  0625   CR 0.64 0.58 0.58     Recent Labs   Lab Test 11/01/17  1623   SED 39*     Recent Labs   Lab Test 03/16/19  1550 03/16/19  1455 03/16/19  1222 03/16/19  1200 02/04/19  2130 01/31/19  0410 01/30/19  2120 01/30/19  2016 01/30/19  1522   CULT No growth after 4 days Methicillin resistant Staphylococcus aureus (MRSA) isolated  This isolate DOES NOT demonstrate inducible clindamycin resistance in vitro. Clindamycin   is susceptible and could be used when indicated, however, erythromycin is resistant and   should not be used.  *  Olive Donald RN 5th floor notified of MRSA 3/20/19 0800 dg 50,000 to 100,000 colonies/mL  Pseudomonas aeruginosa  *  <10,000 colonies/mL  Enterococcus faecium (VRE)  *  Critical Value/Significant Value, preliminary result only, called to and read back by  Merline Bryan RN at [facility] MS5 at 2:30pm 3/18/2019 ()   No growth after 4 days Cultured on the 2nd day of incubation:  Staphylococcus epidermidis  *  Critical Value/Significant Value, preliminary result only, called to  and read back by  Claudia Marsh RN 4018 2/6/19. MS    (Note)  POSITIVE for STAPHYLOCOCCUS EPIDERMIDIS and POSITIVE for the mecA  gene (resistant to methicillin) by [employer] multiplex nucleic acid  test. Final identification and antimicrobial susceptibility testing  will be verified by standard methods.      Specimen tested with Verigene multiplex, gram-positive blood culture  nucleic acid test for the following targets: Staph aureus, Staph  epidermidis, Staph lugdunensis, other Staph species, Enterococcus  faecalis, Enterococcus faecium, Streptococcus species, S. agalactiae,  S. anginosus grp., S. pneumoniae, S. pyogenes, Listeria sp., mecA  (methicillin resistance) and Randolph/B (vancomycin resistance).      Critical Value/Significant Value called to and read back by FREDO ESPINO 02.06.2019 AT 7.43AM,ZG    No growth Moderate growth  Normal geovanna   Light growth  Escherichia coli  *  Moderate growth  beta hemolytic   Streptococcus constellatus  *  Moderate growth  Normal skin geovanna    Moderate growth  Normal skin geovanna    Moderate growth  beta hemolytic   Streptococcus constellatus  Susceptibility testing done on previous specimen  * 50,000 to 100,000 colonies/mL  Lactose fermenting gram negative rods  *  10,000 to 50,000 colonies/mL  Strain 2  Lactose fermenting gram negative rods  *  <10,000 colonies/mL  Strain 3  Lactose fermenting gram negative rods  *  <10,000 colonies/mL  Strain 4  Lactose fermenting gram negative rods  *  Susceptibility testing not routinely done Cultured on the 1st day of incubation:  Escherichia coli  Susceptibility testing done on previous specimen  *  Critical Value/Significant Value, preliminary result only, called to and read back by  Beronica Woods  RN @0712 01/31/19 gd

## 2019-03-20 NOTE — PROGRESS NOTES
[facility]rology Progress Note          Assessment and Plan:   Impression:    53 year old female with apparent urosepsis, POD 4 s/p left ureteroscopy and stone treatment with Dr. Chauhan. Improving.       Plan:    - Appreciate input from ID and care from the Hospitalist Service. Plan for transition PO abx soon. On Dapto and Cipro IV. Stent out next week with Dr. Tien Sen, DAYAN  [facility]  [phone]               Interval History:   Doing better each day              Review of Systems:   The 5 point Review of Systems is negative other than noted in the HPI             Medications:     Current Facility-Administered Medications Ordered in Epic   Medication Dose Route Frequency Last Rate Last Dose     acetaminophen (TYLENOL) solution 650 mg  650 mg Oral Q4H PRN   650 mg at 03/19/19 0628     amitriptyline (ELAVIL) tablet 100 mg  100 mg Oral At Bedtime   100 mg at 03/19/19 2145     atorvastatin (LIPITOR) tablet 10 mg  10 mg Oral Daily   10 mg at 03/20/19 0817     baclofen (LIORESAL) tablet 10 mg  10 mg Oral TID   10 mg at 03/20/19 0818     baclofen (LIORESAL) tablet 10 mg  10 mg Oral Daily PRN   10 mg at 03/17/19 1444     bisacodyl (DULCOLAX) Suppository 10 mg  10 mg Rectal Daily PRN         ciprofloxacin (CIPRO) infusion 400 mg  400 mg Intravenous Q12H 200 mL/hr at 03/20/19 0826 400 mg at 03/20/19 0826     DAPTOmycin (CUBICIN) 500 mg in sodium chloride 0.9 % 100 mL intermittent infusion  4 mg/kg Intravenous Q24H   500 mg at 03/19/19 1411     glucose gel 15-30 g  15-30 g Oral Q15 Min PRN        Or     dextrose 50 % injection 25-50 mL  25-50 mL Intravenous Q15 Min PRN        Or     glucagon injection 1 mg  1 mg Subcutaneous Q15 Min PRN         docusate sodium (COLACE) capsule 100 mg  100 mg Oral BID PRN   100 mg at 03/17/19 2206     furosemide (LASIX) tablet 40 mg  40 mg Oral Daily   40 mg at 03/20/19 0818     gabapentin (NEURONTIN) tablet 600 mg  600 mg Oral TID   600 mg at  03/20/19 0816     heparin sodium injection 7,500 Units  7,500 Units Subcutaneous Q12H   7,500 Units at 03/20/19 0814     insulin aspart (NovoLOG) inj (RAPID ACTING)  1-7 Units Subcutaneous TID AC         insulin aspart (NovoLOG) inj (RAPID ACTING)  1-5 Units Subcutaneous At Bedtime         lactated ringers BOLUS 500 mL  500 mL Intravenous Once PRN         lidocaine 1 % 0.5-5 mL  0.5-5 mL Other Once PRN         magnesium sulfate 4 g in 100 mL sterile water (premade)  4 g Intravenous Q4H PRN         Medication given by intrathecal pump: This is NOT an order to dispense medication. For information only.   Does not apply Continuous PRN         melatonin tablet 1 mg  1 mg Oral At Bedtime PRN         naloxone (NARCAN) injection 0.1-0.4 mg  0.1-0.4 mg Intravenous Q2 Min PRN         ondansetron (ZOFRAN-ODT) ODT tab 4 mg  4 mg Oral Q6H PRN        Or     ondansetron (ZOFRAN) injection 4 mg  4 mg Intravenous Q6H PRN   4 mg at 03/17/19 2209     oxyCODONE (ROXICODONE) tablet 15 mg  15 mg Oral Q4H PRN   15 mg at 03/20/19 0701     polyethylene glycol (MIRALAX/GLYCOLAX) Packet 17 g  17 g Oral Daily   17 g at 03/20/19 0823     polyethylene glycol (MIRALAX/GLYCOLAX) Packet 17 g  17 g Oral BID PRN         potassium chloride (KLOR-CON) Packet 20-40 mEq  20-40 mEq Oral or Feeding Tube Q2H PRN   20 mEq at 03/18/19 0942     potassium chloride 10 mEq in 100 mL intermittent infusion with 10 mg lidocaine  10 mEq Intravenous Q1H PRN         potassium chloride 10 mEq in 100 mL sterile water intermittent infusion (premix)  10 mEq Intravenous Q1H PRN         potassium chloride 20 mEq in 50 mL intermittent infusion  20 mEq Intravenous Q1H PRN         potassium chloride ER (K-DUR/KLOR-CON M) CR tablet 20 mEq  20 mEq Oral Daily   20 mEq at 03/20/19 0819     potassium chloride ER (K-DUR/KLOR-CON M) CR tablet 20-40 mEq  20-40 mEq Oral Q2H PRN         senna-docusate (SENOKOT-S/PERICOLACE) 8.6-50 MG per tablet 3 tablet  3 tablet Oral BID   3 tablet at  03/20/19 0817     sodium chloride (PF) 0.9% PF flush 10 mL  10 mL Intracatheter Q7 Days   10 mL at 03/16/19 1548     sodium chloride (PF) 0.9% PF flush 10-20 mL  10-20 mL Intracatheter q1 min prn   20 mL at 03/20/19 0825     sodium chloride 0.9% infusion   Intravenous Continuous   Stopped at 03/18/19 1301     vitamin D3 (CHOLECALCIFEROL) 1000 units (25 mcg) tablet 1,000 Units  1,000 Units Oral Daily   1,000 Units at 03/20/19 0817     No current [facility]-ordered outpatient medications on file.                  Physical Exam:   Vitals were reviewed  Patient Vitals for the past 8 hrs:   BP Temp Temp src Pulse Resp SpO2   03/20/19 0949 -- 100  F (37.8  C) Oral 92 18 91 %   03/20/19 0800 -- -- -- -- -- 96 %   03/20/19 0742 -- 100.2  F (37.9  C) Axillary -- -- --   03/20/19 0740 122/54 100.5  F (38.1  C) Oral 97 -- 92 %   03/20/19 0701 -- -- -- -- 18 --     GEN: NAD, resting in chair  EYES: EOMI  MOUTH: MMM  NECK: Supple  RESP: Unlabored breathing             Data:   No results found for: NTBNPI, NTBNP  Lab Results   Component Value Date    WBC 11.9 (H) 03/20/2019    WBC 16.9 (H) 03/19/2019    WBC 21.6 (H) 03/18/2019    HGB 9.0 (L) 03/20/2019    HGB 8.7 (L) 03/19/2019    HGB 8.2 (L) 03/18/2019    HCT 29.3 (L) 03/20/2019    HCT 29.4 (L) 03/19/2019    HCT 26.6 (L) 03/18/2019    MCV 86 03/20/2019    MCV 88 03/19/2019    MCV 87 03/18/2019     03/20/2019     03/19/2019     03/18/2019     Lab Results   Component Value Date    INR 0.93 10/03/2013

## 2019-03-20 NOTE — PLAN OF CARE
Alert and oriented x4. Up with lift. WC bound at baseline. Sats above 90% on 1LNC. Slept in recliner until 0200. Repositioned in bed. Oxy given 1x for pain. BG @ 0200 118. Bobo in place. Requiring 2 to 3 more days with IV abx.

## 2019-03-20 NOTE — PLAN OF CARE
PT: Orders received, evaluation completed and treatment initiated. 53 year old female admitted septic shock, acute repsiratory failure and metabolic encephalopathy. At baseline, pt is independent with squat-pivot transfers into a wheelchair or car. Pt reports not being able to do this for a month or more. Lives with her spouse in a wheelchair accessible home. Pt at TCU PTA was working on standing frame, slide board transfers and transition to squat-pivot transfers.    Discharge Planner PT   Patient plan for discharge: Return to TCU  Current status: Not able to stand from low chair as Paula Cruz does not fit. Chair to bed via mechanical lift. Performed repeated half stands from elevated bed height with mod A x 2. Pt able to stand for ~10-15 seconds in Steady with CGA x 2. Needs seated rest between bouts. Bed back to chair via mechanical lift. Tolerates seated LE exercises with red theraband.  Barriers to return to prior living situation: Pt mod I with squat pivot transfers at baseline  Recommendations for discharge: Return to TCU  Rationale for recommendations: Pt would benefit from returning to TCU to continue to address strength deficits to maximize IND with mobility prior to returning home. Will continue to follow 5x/week while IP.        Entered by: Caitlyn Castro 03/20/2019 2:54 PM

## 2019-03-21 ENCOUNTER — APPOINTMENT (OUTPATIENT)
Dept: PHYSICAL THERAPY | Facility: CLINIC | Age: 54
DRG: 856 | End: 2019-03-21
Payer: COMMERCIAL

## 2019-03-21 ENCOUNTER — APPOINTMENT (OUTPATIENT)
Dept: OCCUPATIONAL THERAPY | Facility: CLINIC | Age: 54
DRG: 856 | End: 2019-03-21
Payer: COMMERCIAL

## 2019-03-21 LAB
ANION GAP SERPL CALCULATED.3IONS-SCNC: 5 MMOL/L (ref 3–14)
APPEARANCE STONE: NORMAL
BASE EXCESS BLDV CALC-SCNC: 10.4 MMOL/L
BUN SERPL-MCNC: 13 MG/DL (ref 7–30)
CALCIUM SERPL-MCNC: 8.5 MG/DL (ref 8.5–10.1)
CHLORIDE SERPL-SCNC: 98 MMOL/L (ref 94–109)
CO2 SERPL-SCNC: 34 MMOL/L (ref 20–32)
COMPN STONE: NORMAL
CREAT SERPL-MCNC: 0.65 MG/DL (ref 0.52–1.04)
ERYTHROCYTE [DISTWIDTH] IN BLOOD BY AUTOMATED COUNT: 17.3 % (ref 10–15)
GFR SERPL CREATININE-BSD FRML MDRD: >90 ML/MIN/{1.73_M2}
GLUCOSE BLDC GLUCOMTR-MCNC: 106 MG/DL (ref 70–99)
GLUCOSE BLDC GLUCOMTR-MCNC: 115 MG/DL (ref 70–99)
GLUCOSE BLDC GLUCOMTR-MCNC: 137 MG/DL (ref 70–99)
GLUCOSE SERPL-MCNC: 113 MG/DL (ref 70–99)
HCO3 BLDV-SCNC: 37 MMOL/L (ref 21–28)
HCT VFR BLD AUTO: 30.4 % (ref 35–47)
HGB BLD-MCNC: 9.4 G/DL (ref 11.7–15.7)
MCH RBC QN AUTO: 26.3 PG (ref 26.5–33)
MCHC RBC AUTO-ENTMCNC: 30.9 G/DL (ref 31.5–36.5)
MCV RBC AUTO: 85 FL (ref 78–100)
NUMBER STONE: 2
O2/TOTAL GAS SETTING VFR VENT: ABNORMAL %
OXYHGB MFR BLDV: 59 %
PCO2 BLDV: 56 MM HG (ref 40–50)
PH BLDV: 7.42 PH (ref 7.32–7.43)
PLATELET # BLD AUTO: 267 10E9/L (ref 150–450)
PO2 BLDV: 34 MM HG (ref 25–47)
POTASSIUM SERPL-SCNC: 3.5 MMOL/L (ref 3.4–5.3)
RBC # BLD AUTO: 3.58 10E12/L (ref 3.8–5.2)
SIZE STONE: NORMAL MM
SODIUM SERPL-SCNC: 137 MMOL/L (ref 133–144)
TROPONIN I SERPL-MCNC: <0.015 UG/L (ref 0–0.04)
WBC # BLD AUTO: 14 10E9/L (ref 4–11)
WT STONE: 7 MG

## 2019-03-21 PROCEDURE — 00000146 ZZHCL STATISTIC GLUCOSE BY METER IP

## 2019-03-21 PROCEDURE — 97110 THERAPEUTIC EXERCISES: CPT | Mod: GP

## 2019-03-21 PROCEDURE — 40000275 ZZH STATISTIC RCP TIME EA 10 MIN

## 2019-03-21 PROCEDURE — 84484 ASSAY OF TROPONIN QUANT: CPT | Performed by: INTERNAL MEDICINE

## 2019-03-21 PROCEDURE — 25000128 H RX IP 250 OP 636: Performed by: INTERNAL MEDICINE

## 2019-03-21 PROCEDURE — 12000000 ZZH R&B MED SURG/OB

## 2019-03-21 PROCEDURE — 25800030 ZZH RX IP 258 OP 636: Performed by: INTERNAL MEDICINE

## 2019-03-21 PROCEDURE — 25000132 ZZH RX MED GY IP 250 OP 250 PS 637: Performed by: INTERNAL MEDICINE

## 2019-03-21 PROCEDURE — 82805 BLOOD GASES W/O2 SATURATION: CPT | Performed by: INTERNAL MEDICINE

## 2019-03-21 PROCEDURE — 99233 SBSQ HOSP IP/OBS HIGH 50: CPT | Performed by: INTERNAL MEDICINE

## 2019-03-21 PROCEDURE — 97110 THERAPEUTIC EXERCISES: CPT | Mod: GO

## 2019-03-21 PROCEDURE — 93005 ELECTROCARDIOGRAM TRACING: CPT

## 2019-03-21 PROCEDURE — 36415 COLL VENOUS BLD VENIPUNCTURE: CPT | Performed by: INTERNAL MEDICINE

## 2019-03-21 PROCEDURE — 85027 COMPLETE CBC AUTOMATED: CPT | Performed by: INTERNAL MEDICINE

## 2019-03-21 PROCEDURE — 80048 BASIC METABOLIC PNL TOTAL CA: CPT | Performed by: INTERNAL MEDICINE

## 2019-03-21 RX ORDER — LOSARTAN POTASSIUM 50 MG/1
50 TABLET ORAL DAILY
Status: DISCONTINUED | OUTPATIENT
Start: 2019-03-22 | End: 2019-03-26 | Stop reason: HOSPADM

## 2019-03-21 RX ORDER — METOPROLOL TARTRATE 25 MG/1
25 TABLET, FILM COATED ORAL 2 TIMES DAILY
Status: DISCONTINUED | OUTPATIENT
Start: 2019-03-21 | End: 2019-03-26 | Stop reason: HOSPADM

## 2019-03-21 RX ADMIN — BACLOFEN 10 MG: 10 TABLET ORAL at 08:29

## 2019-03-21 RX ADMIN — OXYCODONE HYDROCHLORIDE 15 MG: 5 TABLET ORAL at 16:09

## 2019-03-21 RX ADMIN — SENNOSIDES AND DOCUSATE SODIUM 3 TABLET: 8.6; 5 TABLET ORAL at 20:10

## 2019-03-21 RX ADMIN — GABAPENTIN 600 MG: 600 TABLET, FILM COATED ORAL at 16:09

## 2019-03-21 RX ADMIN — GABAPENTIN 600 MG: 600 TABLET, FILM COATED ORAL at 21:32

## 2019-03-21 RX ADMIN — METOPROLOL TARTRATE 25 MG: 25 TABLET ORAL at 21:32

## 2019-03-21 RX ADMIN — DAPTOMYCIN 500 MG: 500 INJECTION, POWDER, LYOPHILIZED, FOR SOLUTION INTRAVENOUS at 13:44

## 2019-03-21 RX ADMIN — POLYETHYLENE GLYCOL 3350 17 G: 17 POWDER, FOR SOLUTION ORAL at 08:31

## 2019-03-21 RX ADMIN — SENNOSIDES AND DOCUSATE SODIUM 3 TABLET: 8.6; 5 TABLET ORAL at 08:29

## 2019-03-21 RX ADMIN — OXYCODONE HYDROCHLORIDE 15 MG: 5 TABLET ORAL at 21:32

## 2019-03-21 RX ADMIN — OXYCODONE HYDROCHLORIDE 15 MG: 5 TABLET ORAL at 11:04

## 2019-03-21 RX ADMIN — OXYCODONE HYDROCHLORIDE 15 MG: 5 TABLET ORAL at 06:11

## 2019-03-21 RX ADMIN — CIPROFLOXACIN HYDROCHLORIDE 500 MG: 500 TABLET, FILM COATED ORAL at 20:10

## 2019-03-21 RX ADMIN — ATORVASTATIN CALCIUM 10 MG: 10 TABLET, FILM COATED ORAL at 08:28

## 2019-03-21 RX ADMIN — BACLOFEN 10 MG: 10 TABLET ORAL at 16:09

## 2019-03-21 RX ADMIN — BACLOFEN 10 MG: 10 TABLET ORAL at 21:32

## 2019-03-21 RX ADMIN — AMITRIPTYLINE HYDROCHLORIDE 100 MG: 50 TABLET, FILM COATED ORAL at 21:32

## 2019-03-21 RX ADMIN — FUROSEMIDE 40 MG: 40 TABLET ORAL at 08:30

## 2019-03-21 RX ADMIN — GABAPENTIN 600 MG: 600 TABLET, FILM COATED ORAL at 08:28

## 2019-03-21 RX ADMIN — CIPROFLOXACIN HYDROCHLORIDE 500 MG: 500 TABLET, FILM COATED ORAL at 08:29

## 2019-03-21 RX ADMIN — VITAMIN D, TAB 1000IU (100/BT) 1000 UNITS: 25 TAB at 08:28

## 2019-03-21 RX ADMIN — HEPARIN SODIUM 7500 UNITS: 5000 INJECTION, SOLUTION INTRAVENOUS; SUBCUTANEOUS at 20:10

## 2019-03-21 RX ADMIN — HEPARIN SODIUM 7500 UNITS: 5000 INJECTION, SOLUTION INTRAVENOUS; SUBCUTANEOUS at 08:27

## 2019-03-21 RX ADMIN — POTASSIUM CHLORIDE 20 MEQ: 1500 TABLET, EXTENDED RELEASE ORAL at 08:27

## 2019-03-21 ASSESSMENT — ACTIVITIES OF DAILY LIVING (ADL)
ADLS_ACUITY_SCORE: 25
ADLS_ACUITY_SCORE: 25
ADLS_ACUITY_SCORE: 23
ADLS_ACUITY_SCORE: 25

## 2019-03-21 NOTE — PROGRESS NOTES
Discharge Planner   Discharge Plans in progress: Following for TCU placement at discharge.   Barriers to discharge plan: Need prior auth. Referral sent   Follow up plan: discharge pending [employer] Auth. Referrals sent to [facility] and [facility]  Pt will need W/c transport at [place]       Entered by: Corinne C. White 03/21/2019 7:47 AM        CM: once placement can be found SW/ will need to update that pt is due to have her pain pump filled at [facility] [phone]

## 2019-03-21 NOTE — PROVIDER NOTIFICATION
Md notified of pt reported 6/10 CP VSS no SOB. Worsened when takes deep breath. Pt states it is starting to subside please advise.   -awaiting md orders.     MD notified of EKG complete

## 2019-03-21 NOTE — PLAN OF CARE
Discharge Planner PT   Patient plan for discharge: Return to TCU  Current status: Pt rec seated in chair in room, agreeable to PT. Engaged in seated LE exercises, L LE with red theraband, R LE isometrics. Performed seated weight shifting/ modified WC push up for pressure relief and discussed importance of performing every 30 min- hr while seated for pressure relief.  Barriers to return to prior living situation: Pt mod I with squat pivot transfers at baseline  Recommendations for discharge: Return to TCU  Rationale for recommendations: Pt would benefit from returning to TCU to continue to address strength deficits to maximize IND with mobility prior to returning home. Will continue to follow 5x/week while IP.          Entered by: Kelly Renae 03/21/2019 2:32 PM

## 2019-03-21 NOTE — PROGRESS NOTES
[facility]    Hospitalist Progress Note      Assessment & Plan   Amanda Richardson is a 53 year old female who was admitted on 3/16/2019.  Past medical history is complex with frequent hospitalizations. Recently discharged 2/15/19 after a 2 week hospitalization for septic shock and respiratory failure (discharged at that time on 3L home O2). Patient subsequently underwent outpatient urologic procedure 3/15/19 with lithotripsy and left ureteral stent exchange with Dr. Chauhan under general anesthesia.  Patient remained lethargic following procedure.  Patient was ultimately hypoxic (72% on room air) at TCU the day following the procedure and taken to the emergency department where she was ultimately found to be septic and emergently intubated.  Patient admitted to the ICU for further evaluation and treatment.    Septic shock secondary to ureteral stone with lithotripsy and left ureteral stent exchange   Patient initially required low-dose levophed to maintain blood pressure.  Initially treated with vancomycin and Zosyn.  Urine culture growing ,000 Pseudomonas which is pansensitive, as well as < 10k VRE.  Infectious disease following.  IV cipro changed to PO cipro BID.   Given additional finding of VRE, daptomycin has been started as well with ID recommending to continue while inpatient and stop at discharge.  Appreciate infectious disease recommendations.  Continue Bobo for maximal urinary decompression.  Urology plans to take out stent next week with Dr. Chauhan.     Acute respiratory failure secondary to severe sepsis  Metabolic encephalopathy secondary to sepsis  Hypoxic and hypercapneic respiratory failure  Patient required emergent intubation in the ER and was extubated 3/17/19.  Continue to have increased O2 requirements at night (up to 4L) and often able to wean to 1L during the day.   Note that patient required BiPAP and oxygen during recent hospitalization.  Patient was actually  discharged on 3 L by nasal cannula to TCU.  She states that this was weaned down while she was there and she was tolerating room air well.  Patient became hypoxic the day after recent procedure  Suspect that patient has some degree of obesity hypoventilation and would benefit from outpatient sleep study with nocturnal CPAP.  VBG repeated today and patient is actually not acidotic.   Continue to encourage incentive spirometer.  Possible that patient will need to discharge on home O2 again with outpatient PFTs and sleep study evaluation.    History of hypertension  PTA medications on hold given septic shock on admission.    BP is starting to increase slightly and will plan to slowly resume PTA meds.   Currently on lasix 40 mg daily.   Will resume metoprolol 50 mg PO daily (but as 25 mg BID for easier titration initially) and losartan 50 mg daily as renal function at baseline.  Will continue to follow closely.    Acute kidney injury, resolved  Renal function has improved to baseline.  Patient with good urine output.  Restarted PTA Lasix 40 mg p.o. daily.  Restarting PTA losartan 50 mg daily .  Renally dose medications and avoid nephrotoxins.    History of multiple sclerosis  Chronic pain  Resume baclofen, PTA narcotics, PTA Neurontin, PTA amitriptyline.   As long as patient discharges in the coming few days she will have enough baclofen and baclofen pump.  Discussed with pharmacy and there should be enough baclofen in reservoir to last at least until 4/5/19, though refill will need to be planned for after discharge.    Type 2 diabetes mellitus  Hemoglobin A1c 6.5%.  PTA metformin on hold.  Diabetic diet.  AC/HS glucose checks with sliding scale insulin  Have been ordered, but as patient has not had any insulin requirements, will discontinue AC/HS checks at this time.     Hyperlipidemia  Continue PTA statin.    DVT Prophylaxis: subQ heparin  Code Status: Full Code  Expected discharge: Anticipate hospitalization at least  1-2 more days pending prior authorization. Continue IV daptomycin until discharge.    Yanna Echavarria MD FACP  Hospitalist Service  [facility]  Text Page (7am - 6pm)      Interval History   Prior authorization is currently pending.  Blood pressure slowly trending up.    Otherwise no acute events.  Patient without any new complaints.      -Data reviewed today: I reviewed all new labs and imaging results over the last 24 hours.      Physical Exam   Temp: 99.5  F (37.5  C) Temp src: Oral BP: 137/47   Heart Rate: 87 Resp: 18 SpO2: 94 % O2 Device: Nasal cannula Oxygen Delivery: 1 LPM  Vitals:    03/16/19 1530 03/17/19 0300 03/19/19 0441   Weight: 119.6 kg (263 lb 10.7 oz) 122.4 kg (269 lb 13.5 oz) 123.3 kg (271 lb 12.8 oz)     Vital Signs with Ranges  Temp:  [98.7  F (37.1  C)-99.5  F (37.5  C)] 99.5  F (37.5  C)  Heart Rate:  [87-94] 87  Resp:  [18] 18  BP: (137-163)/(47-75) 137/47  SpO2:  [87 %-97 %] 94 %  I/O last 3 completed shifts:  In: 900 [P.O.:900]  Out: 7750 [Urine:7750]    Constitutional: Morbidly obese.  Alert and oriented x3.  Pleasant and sitting up in chair watching TV.  Nontoxic.  No acute distress.  HEENT: NCAT. EOMI. Moist oral mucosa.   Respiratory: Clear to auscultation bilaterally, though diminished throughout.  No crackles or wheezes.  Remains on 1 L O2 by nasal cannula  Cardiovascular: Regular rate and rhythm. No murmur.  GI: Morbidly obese. Soft, nontender, nondistended.   Musculoskeletal: Chronic venous stasis changes bilaterally with 2+ edema. Otherwise no gross deformities.  Generalized weakness.  Neurologic: Alert and oriented x3.  No focal neurologic deficits.    Medications     Medication given by intrathecal pump: This is NOT an order to dispense medication. For information only.       sodium chloride Stopped (03/18/19 1301)       amitriptyline  100 mg Oral At Bedtime     atorvastatin  10 mg Oral Daily     baclofen  10 mg Oral TID     ciprofloxacin  500 mg Oral Q12H JOSE      DAPTOmycin (CUBICIN) intermittent infusion  4 mg/kg Intravenous Q24H     furosemide  40 mg Oral Daily     gabapentin  600 mg Oral TID     heparin  7,500 Units Subcutaneous Q12H     [START ON 3/22/2019] losartan  50 mg Oral Daily     metoprolol tartrate  25 mg Oral BID     polyethylene glycol  17 g Oral Daily     potassium chloride ER  20 mEq Oral Daily     senna-docusate  3 tablet Oral BID     sodium chloride (PF)  10 mL Intracatheter Q7 Days     vitamin D3  1,000 Units Oral Daily       Data   Recent Labs   Lab 03/21/19  0638 03/21/19  0540 03/20/19  0545 03/19/19  0626  03/17/19  0615 03/16/19  1725 03/16/19  1200   WBC  --  14.0* 11.9* 16.9*   < > 31.1*  --  38.5*   HGB  --  9.4* 9.0* 8.7*   < > 9.4*  --  9.8*   MCV  --  85 86 88   < > 87  --  88   PLT  --  267 248 249   < > 273  --  321   NA  --  137 139 139   < > 139  --  133   POTASSIUM  --  3.5 3.5 3.8   < > 3.4  --  4.8   CHLORIDE  --  98 100 101   < > 104  --  98   CO2  --  34* 37* 36*   < > 32  --  29   BUN  --  13 10 9   < > 22  --  31*   CR  --  0.65 0.64 0.58   < > 0.96  --  1.64*   ANIONGAP  --  5 2* 2*   < > 3  --  6   MARY  --  8.5 8.4* 8.2*   < > 7.7*  --  7.7*   GLC  --  113* 98 105*   < > 120*  --  123*   ALBUMIN  --   --   --   --   --  2.1*  --  2.3*   PROTTOTAL  --   --   --   --   --  6.9  --  7.3   BILITOTAL  --   --   --   --   --  0.9  --  0.7   ALKPHOS  --   --   --   --   --  170*  --  171*   ALT  --   --   --   --   --  29  --  28   AST  --   --   --   --   --  36  --  31   TROPI <0.015  --   --   --   --   --  0.061* 0.050*    < > = values in this interval not displayed.       No results found for this or any previous visit (from the past 24 hour(s)).

## 2019-03-21 NOTE — PROGRESS NOTES
"[facility]  Infectious Disease Progress Note          Assessment and Plan:     ASSESSMENT:  1. 54 yo female acute SEPTIC SHOCK-resolved,  BC negative, UC  With  P.aeruginosa,  Onset after  L ureteroscopy,  Doing well,  JULIANNE  Resolved, off pressors  2. UTI,  UROSEPSIS-Pseudomonas aeruginosa,   SUSY pansens  3. JULIANNE-Resolved  4.  OBESITY  5.  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 H/O T CELL  LYMPHOMA     REC  1.  Continue cipro , po OK  at 500 bid , plan 2 weeks total ABX, day 6/14 today  2 Would normally ignore VRE UC but with uriary manipulation give a bit of dapto(linezolid drug interactions so avoid),continuewhile here any way, but stop at disposition  3 Discussed VRE/iso issues in detail, MRSA + nares, no tx already iso        Interval History:   no new complaints feels better out of ICU; no cp, sob, n/v/d, or abd pain. Chronic R leg pain, no other pain site  cxs as above  cx also low ct VRE nares MRSA              Medications:       amitriptyline  100 mg Oral At Bedtime     atorvastatin  10 mg Oral Daily     baclofen  10 mg Oral TID     ciprofloxacin  500 mg Oral Q12H OJSE     DAPTOmycin (CUBICIN) intermittent infusion  4 mg/kg Intravenous Q24H     furosemide  40 mg Oral Daily     gabapentin  600 mg Oral TID     heparin  7,500 Units Subcutaneous Q12H     insulin aspart  1-7 Units Subcutaneous TID AC     insulin aspart  1-5 Units Subcutaneous At Bedtime     polyethylene glycol  17 g Oral Daily     potassium chloride ER  20 mEq Oral Daily     senna-docusate  3 tablet Oral BID     sodium chloride (PF)  10 mL Intracatheter Q7 Days     vitamin D3  1,000 Units Oral Daily                  Physical Exam:   Blood pressure 137/47, pulse 92, temperature 99.5  F (37.5  C), temperature source Oral, resp. rate 18, height 1.702 m (5' 7\"), weight 123.3 kg (271 lb 12.8 oz), last menstrual period 12/11/2011, SpO2 94 %, not currently breastfeeding.  Wt Readings from Last 2 Encounters:   03/19/19 " 123.3 kg (271 lb 12.8 oz)   03/15/19 112.9 kg (249 lb)     Vital Signs with Ranges  Temp:  [98.7  F (37.1  C)-99.5  F (37.5  C)] 99.5  F (37.5  C)  Heart Rate:  [87-94] 87  Resp:  [18] 18  BP: (137-163)/(47-75) 137/47  SpO2:  [87 %-97 %] 94 %    Constitutional: Awake, alert, cooperative, no apparent distress cognition Ok   Lungs: Clear to auscultation bilaterally, no crackles or wheezing   Cardiovascular: Regular rate and rhythm, normal S1 and S2, and no murmur noted   Abdomen: Normal bowel sounds, soft, non-distended, non-tender   Skin: No rashes, no cyanosis, no edema   Other:           Data:   All microbiology laboratory data reviewed.  Recent Labs   Lab Test 03/21/19  0540 03/20/19  0545 03/19/19  0626   WBC 14.0* 11.9* 16.9*   HGB 9.4* 9.0* 8.7*   HCT 30.4* 29.3* 29.4*   MCV 85 86 88    248 249     Recent Labs   Lab Test 03/21/19  0540 03/20/19  0545 03/19/19  0626   CR 0.65 0.64 0.58     Recent Labs   Lab Test 11/01/17  1623   SED 39*     Recent Labs   Lab Test 03/16/19  1550 03/16/19  1455 03/16/19  1222 03/16/19  1200 02/04/19  2130 01/31/19  0410 01/30/19  2120 01/30/19  2016 01/30/19  1522   CULT No growth after 5 days Methicillin resistant Staphylococcus aureus (MRSA) isolated  This isolate DOES NOT demonstrate inducible clindamycin resistance in vitro. Clindamycin   is susceptible and could be used when indicated, however, erythromycin is resistant and   should not be used.  *  Olive Donald RN 5th floor notified of MRSA 3/20/19 0800 dg 50,000 to 100,000 colonies/mL  Pseudomonas aeruginosa  *  <10,000 colonies/mL  Enterococcus faecium (VRE)  *  Critical Value/Significant Value, preliminary result only, called to and read back by  Merline Bryan RN at [facility] MS5 at 2:30pm 3/18/2019 ()   No growth after 5 days Cultured on the 2nd day of incubation:  Staphylococcus epidermidis  *  Critical Value/Significant Value, preliminary result only, called to and read back by  Claudia Marsh, FREDO [id number]  2/6/19. MS    (Note)  POSITIVE for STAPHYLOCOCCUS EPIDERMIDIS and POSITIVE for the mecA  gene (resistant to methicillin) by Proxamaigene multiplex nucleic acid  test. Final identification and antimicrobial susceptibility testing  will be verified by standard methods.      Specimen tested with Verigene multiplex, gram-positive blood culture  nucleic acid test for the following targets: Staph aureus, Staph  epidermidis, Staph lugdunensis, other Staph species, Enterococcus  faecalis, Enterococcus faecium, Streptococcus species, S. agalactiae,  S. anginosus grp., S. pneumoniae, S. pyogenes, Listeria sp., mecA  (methicillin resistance) and Randolph/B (vancomycin resistance).      Critical Value/Significant Value called to and read back by FREDO ESPINO 02.06.2019 AT 7.43AM,ZG    No growth Moderate growth  Normal geovanna   Light growth  Escherichia coli  *  Moderate growth  beta hemolytic   Streptococcus constellatus  *  Moderate growth  Normal skin geovanna    Moderate growth  Normal skin geovanna    Moderate growth  beta hemolytic   Streptococcus constellatus  Susceptibility testing done on previous specimen  * 50,000 to 100,000 colonies/mL  Lactose fermenting gram negative rods  *  10,000 to 50,000 colonies/mL  Strain 2  Lactose fermenting gram negative rods  *  <10,000 colonies/mL  Strain 3  Lactose fermenting gram negative rods  *  <10,000 colonies/mL  Strain 4  Lactose fermenting gram negative rods  *  Susceptibility testing not routinely done Cultured on the 1st day of incubation:  Escherichia coli  Susceptibility testing done on previous specimen  *  Critical Value/Significant Value, preliminary result only, called to and read back by  Beronica Woods  RN @0732 01/31/19 gd

## 2019-03-21 NOTE — PLAN OF CARE
Discharge Planner OT   Patient plan for discharge: Return to TCU  Current status: While seated in the chair pt completed BUE exercises with red theraband. Pt completed BUE exercises for 2 sets of 10 reps each exercise. Pt required >3 rest breaks throughout however reported motivation. Handout provided and instructed pt to complete exercises daily for 10 reps each exercise.     Barriers to return to prior living situation: Pt mod I with squat pivot transfers at baseline  Recommendations for discharge: Return to TCU  Rationale for recommendations: Pt will  benefit from returning to TCU to continue to address strength deficits to maximize IND with mobility and ADL's prior to returning home.          Entered by: Toni Murcia 03/21/2019 4:23 PM

## 2019-03-21 NOTE — PROGRESS NOTES
Notified by RN that patient had episode of midsternal chest pain which was worse with deep breathing.Pain is now improving.  Had mildly elevated troponin on 3/16 at 0.061, likely due to sepsis.  Will do one set of troponin and see if there is significant change. Will do a 12-lead EKG.

## 2019-03-21 NOTE — PLAN OF CARE
[id number]: Pt resting comfortably, slept on and off during the night. VSS on 1L O2, ex low grade temp. MD notified of pt c/o CP L chest. See previous note. EKG and trop complete. IS encouraged. Pain managed with oxycodone.  and 115. Transfers with a lift. Bobo in place with AUO. Turn/repo Q2hr for comfort. Will continue to monitor.

## 2019-03-21 NOTE — PLAN OF CARE
Up in room with lift---desats to 85% on room air---O2 at 1L 92-94%-- using IS well -- appetite good -- medicated  for pain with oxy 15 as requested ---repositioned q 2 hours -- resting comfortable between checks

## 2019-03-22 ENCOUNTER — APPOINTMENT (OUTPATIENT)
Dept: PHYSICAL THERAPY | Facility: CLINIC | Age: 54
DRG: 856 | End: 2019-03-22
Payer: COMMERCIAL

## 2019-03-22 LAB
ANION GAP SERPL CALCULATED.3IONS-SCNC: 4 MMOL/L (ref 3–14)
ANISOCYTOSIS BLD QL SMEAR: SLIGHT
BACTERIA SPEC CULT: NO GROWTH
BACTERIA SPEC CULT: NO GROWTH
BASOPHILS # BLD AUTO: 0 10E9/L (ref 0–0.2)
BASOPHILS NFR BLD AUTO: 0 %
BUN SERPL-MCNC: 15 MG/DL (ref 7–30)
CALCIUM SERPL-MCNC: 8.1 MG/DL (ref 8.5–10.1)
CHLORIDE SERPL-SCNC: 101 MMOL/L (ref 94–109)
CO2 SERPL-SCNC: 32 MMOL/L (ref 20–32)
CREAT SERPL-MCNC: 0.63 MG/DL (ref 0.52–1.04)
DIFFERENTIAL METHOD BLD: ABNORMAL
EOSINOPHIL # BLD AUTO: 0.9 10E9/L (ref 0–0.7)
EOSINOPHIL NFR BLD AUTO: 5 %
ERYTHROCYTE [DISTWIDTH] IN BLOOD BY AUTOMATED COUNT: 17.5 % (ref 10–15)
GFR SERPL CREATININE-BSD FRML MDRD: >90 ML/MIN/{1.73_M2}
GLUCOSE SERPL-MCNC: 111 MG/DL (ref 70–99)
HCT VFR BLD AUTO: 30.4 % (ref 35–47)
HGB BLD-MCNC: 9.4 G/DL (ref 11.7–15.7)
INTERPRETATION ECG - MUSE: NORMAL
LYMPHOCYTES # BLD AUTO: 2 10E9/L (ref 0.8–5.3)
LYMPHOCYTES NFR BLD AUTO: 12 %
Lab: NORMAL
Lab: NORMAL
MCH RBC QN AUTO: 26.2 PG (ref 26.5–33)
MCHC RBC AUTO-ENTMCNC: 30.9 G/DL (ref 31.5–36.5)
MCV RBC AUTO: 85 FL (ref 78–100)
MONOCYTES # BLD AUTO: 0.7 10E9/L (ref 0–1.3)
MONOCYTES NFR BLD AUTO: 4 %
MYELOCYTES # BLD: 0.7 10E9/L
MYELOCYTES NFR BLD MANUAL: 4 %
NEUTROPHILS # BLD AUTO: 12.8 10E9/L (ref 1.6–8.3)
NEUTROPHILS NFR BLD AUTO: 75 %
PLATELET # BLD AUTO: 266 10E9/L (ref 150–450)
PLATELET # BLD EST: ABNORMAL 10*3/UL
POTASSIUM SERPL-SCNC: 3.4 MMOL/L (ref 3.4–5.3)
RBC # BLD AUTO: 3.59 10E12/L (ref 3.8–5.2)
SODIUM SERPL-SCNC: 137 MMOL/L (ref 133–144)
SPECIMEN SOURCE: NORMAL
SPECIMEN SOURCE: NORMAL
WBC # BLD AUTO: 17 10E9/L (ref 4–11)

## 2019-03-22 PROCEDURE — 80048 BASIC METABOLIC PNL TOTAL CA: CPT | Performed by: INTERNAL MEDICINE

## 2019-03-22 PROCEDURE — 25000132 ZZH RX MED GY IP 250 OP 250 PS 637: Performed by: INTERNAL MEDICINE

## 2019-03-22 PROCEDURE — 25800030 ZZH RX IP 258 OP 636: Performed by: INTERNAL MEDICINE

## 2019-03-22 PROCEDURE — 85025 COMPLETE CBC W/AUTO DIFF WBC: CPT | Performed by: INTERNAL MEDICINE

## 2019-03-22 PROCEDURE — G0463 HOSPITAL OUTPT CLINIC VISIT: HCPCS

## 2019-03-22 PROCEDURE — 25000128 H RX IP 250 OP 636: Performed by: INTERNAL MEDICINE

## 2019-03-22 PROCEDURE — 99232 SBSQ HOSP IP/OBS MODERATE 35: CPT | Performed by: INTERNAL MEDICINE

## 2019-03-22 PROCEDURE — 12000000 ZZH R&B MED SURG/OB

## 2019-03-22 PROCEDURE — 97110 THERAPEUTIC EXERCISES: CPT | Mod: GP

## 2019-03-22 RX ADMIN — ATORVASTATIN CALCIUM 10 MG: 10 TABLET, FILM COATED ORAL at 08:22

## 2019-03-22 RX ADMIN — BACLOFEN 10 MG: 10 TABLET ORAL at 16:42

## 2019-03-22 RX ADMIN — BACLOFEN 10 MG: 10 TABLET ORAL at 08:22

## 2019-03-22 RX ADMIN — SENNOSIDES AND DOCUSATE SODIUM 2 TABLET: 8.6; 5 TABLET ORAL at 21:27

## 2019-03-22 RX ADMIN — CIPROFLOXACIN HYDROCHLORIDE 500 MG: 500 TABLET, FILM COATED ORAL at 19:09

## 2019-03-22 RX ADMIN — OXYCODONE HYDROCHLORIDE 15 MG: 5 TABLET ORAL at 19:09

## 2019-03-22 RX ADMIN — ACETAMINOPHEN 650 MG: 325 SOLUTION ORAL at 08:23

## 2019-03-22 RX ADMIN — OXYCODONE HYDROCHLORIDE 15 MG: 5 TABLET ORAL at 08:22

## 2019-03-22 RX ADMIN — VITAMIN D, TAB 1000IU (100/BT) 1000 UNITS: 25 TAB at 08:22

## 2019-03-22 RX ADMIN — AMITRIPTYLINE HYDROCHLORIDE 100 MG: 50 TABLET, FILM COATED ORAL at 21:27

## 2019-03-22 RX ADMIN — GABAPENTIN 600 MG: 600 TABLET, FILM COATED ORAL at 16:42

## 2019-03-22 RX ADMIN — HEPARIN SODIUM 7500 UNITS: 5000 INJECTION, SOLUTION INTRAVENOUS; SUBCUTANEOUS at 08:27

## 2019-03-22 RX ADMIN — FUROSEMIDE 40 MG: 40 TABLET ORAL at 08:21

## 2019-03-22 RX ADMIN — OXYCODONE HYDROCHLORIDE 15 MG: 5 TABLET ORAL at 14:08

## 2019-03-22 RX ADMIN — METOPROLOL TARTRATE 25 MG: 25 TABLET ORAL at 08:22

## 2019-03-22 RX ADMIN — POLYETHYLENE GLYCOL 3350 17 G: 17 POWDER, FOR SOLUTION ORAL at 08:20

## 2019-03-22 RX ADMIN — BACLOFEN 10 MG: 10 TABLET ORAL at 21:27

## 2019-03-22 RX ADMIN — SENNOSIDES AND DOCUSATE SODIUM 3 TABLET: 8.6; 5 TABLET ORAL at 08:23

## 2019-03-22 RX ADMIN — OXYCODONE HYDROCHLORIDE 15 MG: 5 TABLET ORAL at 04:37

## 2019-03-22 RX ADMIN — ACETAMINOPHEN 650 MG: 325 SOLUTION ORAL at 21:26

## 2019-03-22 RX ADMIN — POTASSIUM CHLORIDE 20 MEQ: 1500 TABLET, EXTENDED RELEASE ORAL at 08:23

## 2019-03-22 RX ADMIN — GABAPENTIN 600 MG: 600 TABLET, FILM COATED ORAL at 08:22

## 2019-03-22 RX ADMIN — METOPROLOL TARTRATE 25 MG: 25 TABLET ORAL at 21:26

## 2019-03-22 RX ADMIN — GABAPENTIN 600 MG: 600 TABLET, FILM COATED ORAL at 21:27

## 2019-03-22 RX ADMIN — HEPARIN SODIUM 7500 UNITS: 5000 INJECTION, SOLUTION INTRAVENOUS; SUBCUTANEOUS at 19:09

## 2019-03-22 RX ADMIN — LOSARTAN POTASSIUM 50 MG: 50 TABLET, FILM COATED ORAL at 08:22

## 2019-03-22 RX ADMIN — DAPTOMYCIN 500 MG: 500 INJECTION, POWDER, LYOPHILIZED, FOR SOLUTION INTRAVENOUS at 13:29

## 2019-03-22 RX ADMIN — CIPROFLOXACIN HYDROCHLORIDE 500 MG: 500 TABLET, FILM COATED ORAL at 08:21

## 2019-03-22 ASSESSMENT — ACTIVITIES OF DAILY LIVING (ADL)
ADLS_ACUITY_SCORE: 23

## 2019-03-22 NOTE — PLAN OF CARE
Alert and oriented x4. Up with lift. Bobo in place. PICC in right arm. Oxy given x1 for pain. Repositioned q2h. TCU at discharge.

## 2019-03-22 NOTE — PLAN OF CARE
A&Ox4, Ax2 lift.   VSS, RA, Oxycodone x2 for pain.   LS dim, denies SOB or chest pain.   BS active x4, up to commode & had large formed bm.   Bobo patent w/ good output.   Powder to groin area/skin folds.   Bilateral pressure wounds to lower buttocks, L one open--vaseline gauze and mepilex applied.  WOC consulted, air mattress ordered.   Continues on IV daptomycin.   Awaiting TCU placement.   ID, PT, OT, SW following.  Will continue to monitor.

## 2019-03-22 NOTE — PROGRESS NOTES
Discharge Planner   Discharge Plans in progress: Pt has been declined by [facility] due to no bed  Barriers to discharge plan: Spoke with [facility]. They have a female bed HOWEVER they need prior auth with [employer]   Follow up plan: Following       Entered by: Corinne C. White 03/22/2019 3:53 PM

## 2019-03-22 NOTE — PROGRESS NOTES
[facility]  WOC Nurse Inpatient Adult Pressure Injury (PI) Wound Assessment     Assessment of PI(s) on pt's:   Bilateral buttocks    Data:   Patient History:      per MD note(s):  53 year old  female with a significant past medical history of complex medical problems with frequent hospitalization discharged last month on February 15, 2019 after 2 weeks stay in the hospital for septic shock, respiratory failure who currently presents from nursing home with decreased level of consciousness.  Patient had a urologic procedure yesterday on March 15, 2019.  She had cystoscopy, left ureteroscopy with holmium laser lithotripsy and left ureteral stent exchange by Dr. Mayito Chauhan under general anesthesia at [facility] outpatient surgery.  Patient remained lethargic since the procedure was done and had a hard time getting up to a wheelchair to eat.  Emergency medical team were called and patient was found to have hypoxia with oxygen saturation 72% room air and patient was placed on oxygen and was taken to the emergency department for evaluation.  The emergency department patient was stabilized and required emergency intubation and further evaluations done.  On arrival to the emergency department patient's blood pressure was 81/52, temperature was 101.3 and mental status was depressed.  Further studies showed evidence of elevated creatinine at 1.64 from her normal baseline, slightly elevated troponin at 0.050, significant leukocytosis with WBC count of 38.5 with left shift.  Patient was then admitted to our service for further care.   ICU Bedside staff documented non blanchable skin to buttocks on admission. Pt now on MS 5 and RN placed consult for WOC to assess wounds to buttocks as they are evolving and now opening up.     Mattress:  Standard , Atmos air in place and will order a Pulsate  Current pressure relieving devices:  Mepilex dressing and Pillows    Moisture Management:   Incontinence Protocol, Diaper and Urinary Catheter    Catheter secured? Yes    Current Diet / Nutrition:       Orders Placed This Encounter        Combination Diet [id number] Calories: Moderate Consistent CHO (4-6 CHO units/meal)      Issac Risk Assessment  Sensory Perception: 3-->slightly limited    Moisture: 3-->occasionally moist   Activity: 2-->chairfast     Mobility: 3-->slightly limited   Nutrition: 3-->adequate   Issac Score: 16    Labs:   Recent Labs   Lab Test 03/22/19  0615  03/17/19  0615  02/04/19  0545  10/03/13  1614   ALBUMIN  --   --  2.1*   < >  --    < > 3.6*   HGB 9.4*   < > 9.4*   < > 10.3*   < > 12.7   INR  --   --   --   --   --   --  0.93   WBC 17.0*   < > 31.1*   < > 21.6*   < > 14.4*   A1C  --   --  6.3*   < >  --    < >  --    CRP  --   --   --   --  57.7*   < >  --     < > = values in this interval not displayed.                                                                                                                          Pressure Injury Assessment  (location #1):   Bilateral Buttocks    Wound History:   See above, present on admission    Specific Dimensions (length x width x depth, in cm) :       Left Buttocks: 7 x 3cm 60% maroon and 40% non blanchable with superficial erosion of epidermis    Right Buttocks: 1x1cm intact deep maroon/ purple skin, slightly puckered feeling may be due to moisture or friction    Periwound Skin: intact pink skin and all blanchable,      Drainage:    Amount: scant,  Color: serous to left side only    Odor: none    Pain:  No complaints         Intervention:     Patient's chart evaluated.      Issac Interventions:  Current Issac Interventions and Care Plan reviewed and updated, appropriate at this time.    Wound was assessed.    Wound Care: was done: Removal of existing dressing; Visual inspection; Re-Application of clean dressing    Positioned back in chair. Pt has cushion at home and will ask family to bring it in for her,    Orders   "Written    Supplies  Per POC    Discussed plan of care with Patient and Nurse           Assessment:     Pressure Injury located on bilateral Buttocks: Deep tissue injury and present on admission    Status: wound  initial assessment, evolving and would benefit from air bed and continue Mepilex dressing, which pt states helps it feel better    Discussion with pt regarding groin skin fold wounds, she states they are healed and staff are placing dry cloths in folds         Plan:     Nursing to notify the Provider(s) and re-consult the [facility] Nurse if wound(s) deteriorate(s)or if the wound care plan needs reevaluation.    Plan for wound care to wound located on buttocks: Every 3 days    1. Dab clean with wound spray and pat dry    2. Cover open wound with adaptic cut to size    3. Cover all area with Mepilex dressings- use Sacral or 4x4 to cover the area, date    4. Use No sting barrier to help adhere.   5. Pressure Injury Prevention (PIP) MEASURES:  1. If pt is refusing to turn or reposition they must be educated on the potential of injury due to not off loading.  Then this \"educated refusal\" needs to be documented as an \"educated refusal to turn/ reposition\" and document if alert, etc. Additionally, you MUST notify the charge nurse, nurse manager and the provider of the pt's refusal to reposition.  2. Follow Issac Risk recommendations  Nutrition following  Moisture and Incontinence issues in place  3. CHAIR POSITIONING:  REPOSITIONING   Fully off load every 1-2 hours (stand x 5 minutes or back to bed)   Shift side to side every 15 minutes- Metaforic  Chair cushion pt has and will have family bring from home ([id number]) when up to chair (pillow does not actually off load)  4.  BED:    POSTIONING  No direct supine positioning, position only side to side  Try to keep HOB below 30 degrees  Reposition every 1-2 hours  Keep heels elevated- one pillow under each leg from knee to heels, checking heels are free  Pulsate " specialty mattress- ordered        WOC Nurse will return: weekly

## 2019-03-22 NOTE — PROGRESS NOTES
[facility] Urology Progress Note          Assessment and Plan:   Impression:    53 year old female with apparent urosepsis, POD 6 s/p left ureteroscopy and stone treatment with Dr. Chauhan. Improving.       Plan:    - Appreciate input from ID and care from the Hospitalist Service. On Dapto and Cipro PO. Stent out next week with Dr. Chauhan  ?Home today       Lori Sen PA-C  [facility] Urology  [phone]               Interval History:   Doing better each day, sleeping              Review of Systems:   The 5 point Review of Systems is negative other than noted in the HPI             Medications:     Current Facility-Administered Medications Ordered in Epic   Medication Dose Route Frequency Last Rate Last Dose     acetaminophen (TYLENOL) solution 650 mg  650 mg Oral Q4H PRN   650 mg at 03/22/19 0823     amitriptyline (ELAVIL) tablet 100 mg  100 mg Oral At Bedtime   100 mg at 03/21/19 2132     atorvastatin (LIPITOR) tablet 10 mg  10 mg Oral Daily   10 mg at 03/22/19 0822     baclofen (LIORESAL) tablet 10 mg  10 mg Oral TID   10 mg at 03/22/19 0822     baclofen (LIORESAL) tablet 10 mg  10 mg Oral Daily PRN   10 mg at 03/17/19 1444     bisacodyl (DULCOLAX) Suppository 10 mg  10 mg Rectal Daily PRN         ciprofloxacin (CIPRO) tablet 500 mg  500 mg Oral Q12H JOSE   500 mg at 03/22/19 0821     DAPTOmycin (CUBICIN) 500 mg in sodium chloride 0.9 % 100 mL intermittent infusion  4 mg/kg Intravenous Q24H   500 mg at 03/21/19 1344     glucose gel 15-30 g  15-30 g Oral Q15 Min PRN        Or     dextrose 50 % injection 25-50 mL  25-50 mL Intravenous Q15 Min PRN        Or     glucagon injection 1 mg  1 mg Subcutaneous Q15 Min PRN         docusate sodium (COLACE) capsule 100 mg  100 mg Oral BID PRN   100 mg at 03/17/19 2206     furosemide (LASIX) tablet 40 mg  40 mg Oral Daily   40 mg at 03/22/19 0821     gabapentin (NEURONTIN) tablet 600 mg  600 mg Oral TID   600 mg at 03/22/19 0822     heparin sodium  injection 7,500 Units  7,500 Units Subcutaneous Q12H   7,500 Units at 03/22/19 0827     lactated ringers BOLUS 500 mL  500 mL Intravenous Once PRN         lidocaine 1 % 0.5-5 mL  0.5-5 mL Other Once PRN         losartan (COZAAR) tablet 50 mg  50 mg Oral Daily   50 mg at 03/22/19 0822     magnesium sulfate 4 g in 100 mL sterile water (premade)  4 g Intravenous Q4H PRN         Medication given by intrathecal pump: This is NOT an order to dispense medication. For information only.   Does not apply Continuous PRN         melatonin tablet 1 mg  1 mg Oral At Bedtime PRN         metoprolol tartrate (LOPRESSOR) tablet 25 mg  25 mg Oral BID   25 mg at 03/22/19 0822     naloxone (NARCAN) injection 0.1-0.4 mg  0.1-0.4 mg Intravenous Q2 Min PRN         ondansetron (ZOFRAN-ODT) ODT tab 4 mg  4 mg Oral Q6H PRN        Or     ondansetron (ZOFRAN) injection 4 mg  4 mg Intravenous Q6H PRN   4 mg at 03/17/19 2209     oxyCODONE (ROXICODONE) tablet 15 mg  15 mg Oral Q4H PRN   15 mg at 03/22/19 0822     polyethylene glycol (MIRALAX/GLYCOLAX) Packet 17 g  17 g Oral Daily   17 g at 03/22/19 0820     polyethylene glycol (MIRALAX/GLYCOLAX) Packet 17 g  17 g Oral BID PRN         potassium chloride (KLOR-CON) Packet 20-40 mEq  20-40 mEq Oral or Feeding Tube Q2H PRN   20 mEq at 03/18/19 0942     potassium chloride 10 mEq in 100 mL intermittent infusion with 10 mg lidocaine  10 mEq Intravenous Q1H PRN         potassium chloride 10 mEq in 100 mL sterile water intermittent infusion (premix)  10 mEq Intravenous Q1H PRN         potassium chloride 20 mEq in 50 mL intermittent infusion  20 mEq Intravenous Q1H PRN         potassium chloride ER (K-DUR/KLOR-CON M) CR tablet 20 mEq  20 mEq Oral Daily   20 mEq at 03/22/19 0823     potassium chloride ER (K-DUR/KLOR-CON M) CR tablet 20-40 mEq  20-40 mEq Oral Q2H PRN         senna-docusate (SENOKOT-S/PERICOLACE) 8.6-50 MG per tablet 3 tablet  3 tablet Oral BID   3 tablet at 03/22/19 1307     sodium chloride  (PF) 0.9% PF flush 10 mL  10 mL Intracatheter Q7 Days   10 mL at 03/16/19 1548     sodium chloride (PF) 0.9% PF flush 10-20 mL  10-20 mL Intracatheter q1 min prn   20 mL at 03/20/19 1418     sodium chloride 0.9% infusion   Intravenous Continuous   Stopped at 03/18/19 1301     vitamin D3 (CHOLECALCIFEROL) 1000 units (25 mcg) tablet 1,000 Units  1,000 Units Oral Daily   1,000 Units at 03/22/19 0822     No current [facility]-ordered outpatient medications on file.                  Physical Exam:   Vitals were reviewed  Patient Vitals for the past 8 hrs:   BP Temp Temp src Heart Rate Resp SpO2   03/22/19 0728 123/55 98.2  F (36.8  C) Oral 90 16 96 %   03/22/19 0520 -- -- -- -- 18 --   03/22/19 0437 -- -- -- -- 18 --   03/22/19 0116 119/51 97  F (36.1  C) Oral 83 18 96 %     GEN: NAD, sleeping  NECK: Supple  RESP: Unlabored breathing             Data:   No results found for: NTBNPI, NTBNP  Lab Results   Component Value Date    WBC 17.0 (H) 03/22/2019    WBC 14.0 (H) 03/21/2019    WBC 11.9 (H) 03/20/2019    HGB 9.4 (L) 03/22/2019    HGB 9.4 (L) 03/21/2019    HGB 9.0 (L) 03/20/2019    HCT 30.4 (L) 03/22/2019    HCT 30.4 (L) 03/21/2019    HCT 29.3 (L) 03/20/2019    MCV 85 03/22/2019    MCV 85 03/21/2019    MCV 86 03/20/2019     03/22/2019     03/21/2019     03/20/2019     Lab Results   Component Value Date    INR 0.93 10/03/2013

## 2019-03-22 NOTE — PLAN OF CARE
Discharge Planner PT   Patient plan for discharge: TCU  Current status: Focus of session on seated exercises as pt received seated in chair. Declined STS transfers this day   Barriers to return to prior living situation: Level of assist with mobility   Recommendations for discharge: TCU  Rationale for recommendations: Pt will require TCU stay to improve LE strength and transfers as pt below baseline at thist iem          Entered by: Azucena Bill 03/22/2019 4:37 PM

## 2019-03-22 NOTE — PROGRESS NOTES
"BRIEF NUTRITION ASSESSMENT      REASON FOR ASSESSMENT:  Amanda Richardson is a 53 year old female seen by Registered Dietitian for LOS    NUTRITION HISTORY:  Information from chart:   - Patient is known to Nutrition Services from recent prolonged admission  - Followed here by the RDs between 1/31/2019 and 2/18/2019. During this admission, pt was intubated and receiving tube feedings for 1 week, then extubated with good appetite and intakes. She did not meet criteria for malnutrition   - Between last admission and current, pt was residing at [facility]  - Lives in Nursing Home at baseline  - Urologic procedure 1 day p/t admission (3/15/19). Once home, pt experienced decreasing levels of consciousness, inability to participate in care and unable to eat   - H/o CKD stage 1, MS, Non-Hodgkin's lymphoma, type 2 diabetes, is wheelchair bound at baseline     - Information from patient:  - Food at U was not good. She reports have little variety and minimal fresh foods available. She was eating less than she normally would have, although trying to eat what she could  - No known food allergies     CURRENT DIET AND INTAKE:  Diet:  Mod CHO              Pt reports good appetite and enjoyment of the food here. States that her appetite has remains fairly stable since she has been able to eat.    Admitted to ICU on vent support, transferred to floor 3/20  Flowsheets indicate 100% meal consumption TID since 3/19    ANTHROPOMETRICS:  Height: 5' 7\"  Admit Weight:  119.6 kg (263#)  BMI: 41.28 kg/m^2  Weight Status: Obesity Grade III BMI >40  IBW:  61.3 kg   %IBW: 195%  Weight History: wt trending up, likely 2/2 fluid shifts   Wt Readings from Last 10 Encounters:   03/19/19 123.3 kg (271 lb 12.8 oz)   03/15/19 112.9 kg (249 lb)   03/13/19 113.8 kg (250 lb 12.8 oz)   03/08/19 113.2 kg (249 lb 9.6 oz)   03/05/19 113.2 kg (249 lb 9.6 oz)   03/04/19 113.2 kg (249 lb 9.6 oz)   02/28/19 113.2 kg (249 lb 9.6 oz)   02/27/19 100.6 kg (221 " lb 12.5 oz)   09/19/18 111.1 kg (245 lb)   07/16/18 117.9 kg (260 lb)       LABS:  Labs noted  Recent Labs   Lab 03/22/19  0615 03/21/19  1738 03/21/19  1136 03/21/19  0540 03/21/19  0214 03/20/19  2145 03/20/19  1639 03/20/19  1150 03/20/19  0545  03/19/19  0626  03/18/19  0625  03/17/19  0615   *  --   --  113*  --   --   --   --  98  --  105*  --  105*  --  120*   BGM  --  137* 106*  --  115* 130* 96 97  --    < >  --    < >  --    < >  --     < > = values in this interval not displayed.     Lab Results   Component Value Date    A1C 6.3 03/17/2019    A1C 6.6 02/09/2019    A1C 6.4 01/30/2019    A1C 6.8 06/20/2018    A1C 6.5 10/02/2017       MALNUTRITION:  Patient does not meet two of the following criteria necessary for diagnosing malnutrition: significant weight loss, reduced intake, subcutaneous fat loss, muscle loss or fluid retention    NUTRITION INTERVENTION:  Nutrition Diagnosis:  No nutrition diagnosis at this time.    Implementation:  Nutrition Education: Per Provider order if indicated    FOLLOW UP/MONITORING:   Will re-evaluate in 7 - 10 days, or sooner, if re-consulted.        Cheryl Nguyen RD, LD  Clinical Dietitian

## 2019-03-22 NOTE — PROGRESS NOTES
[facility]  Hospitalist Progress Note  Mo Javed MD 03/22/2019    Reason for Stay (Diagnosis): sepsis         Assessment and Plan:      Summary of Stay: Amanda Richardson is a 53 year old female who was admitted on 3/16/2019.  Past medical history is complex with frequent hospitalizations. Recently discharged 2/15/19 after a 2 week hospitalization for septic shock and respiratory failure (discharged at that time on 3L home O2). Patient subsequently underwent outpatient urologic procedure 3/15/19 with lithotripsy and left ureteral stent exchange with Dr. Chauhan under general anesthesia.  Patient remained lethargic following procedure.  Patient was ultimately hypoxic (72% on room air) at TCU the day following the procedure and taken to the emergency department where she was ultimately found to be septic and emergently intubated.  Patient admitted to the ICU for further evaluation and treatment.    Patient was felt to have sepsis related to infected ureteral stone.  Urine culture grew out Pseudomonas.  She is responded well to antibiotics.  She has since been transferred out of the intensive care unit and medically appears stable for discharge in the hospital when a TCU bed is found plan is for follow-up with urology next week for stent removal.     Septic shock secondary to ureteral stone with lithotripsy and left ureteral stent exchange   Patient initially required low-dose levophed to maintain blood pressure.  Initially treated with vancomycin and Zosyn.  Urine culture growing ,000 Pseudomonas which is pansensitive, as well as < 10k VRE.  Infectious disease following.  They recommend total of 2-week course of antibiotic.  She is completed 1 week thus far; anticipate discharge with oral ciprofloxacin to complete course.  Given additional finding of VRE, daptomycin has been started as well with ID recommending to continue while inpatient and stop at discharge.  Continue Bobo for  maximal urinary decompression.  Urology plans to take out stent next week with Dr. Chauhan.   Would expect that Bobo can be removed at that time as well     Acute respiratory failure secondary to severe sepsis - resolved  Metabolic encephalopathy secondary to sepsis  Hypoxic and hypercapneic respiratory failure - resolved  Patient required emergent intubation in the ER and was extubated 3/17/19.  Continue to wean oxygen as able.  Only on 1 L currently.    History of hypertension  PTA medications initially on hold given septic shock on admission; now resumed.    Currently on lasix 40 mg daily.   Will continue metoprolol 50 mg PO daily (but as 25 mg BID for easier titration initially) and losartan 50 mg daily as renal function at baseline.  Will continue to follow closely.     Acute kidney injury, resolved  Renal function has improved to baseline.  Patient with good urine output.  Restarted PTA Lasix 40 mg p.o. daily.  Restarted PTA losartan 50 mg daily .  Renally dose medications and avoid nephrotoxins.     History of multiple sclerosis  Chronic pain  Resumed baclofen, PTA narcotics, PTA Neurontin, PTA amitriptyline.   As long as patient discharges in the coming few days she will have enough baclofen and baclofen pump.    My colleague has already discussed with pharmacy, and there should be enough baclofen in reservoir to last at least until 4/5/19, though refill will need to be planned for after discharge.     Type 2 diabetes mellitus  Hemoglobin A1c 6.5%.  PTA metformin on hold.  Diabetic diet.  AC/HS glucose checks with sliding scale insulin  Have been ordered, but as patient has not had any insulin requirements, will discontinue AC/HS checks at this time.      Hyperlipidemia  Continue PTA statin.     DVT Prophylaxis: subQ heparin  Code Status: Full Code  Expected discharge: To TCU when bed is available.  Medically stable for discharge                 Interval History (Subjective):      No complaints.   "Anticipates discharge to TCU when bed is available.                  Physical Exam:      Last Vital Signs:  /55 (BP Location: Left arm)   Pulse 92   Temp 98.2  F (36.8  C) (Oral)   Resp 16   Ht 1.702 m (5' 7\")   Wt 123.3 kg (271 lb 12.8 oz)   LMP 12/11/2011   SpO2 93%   BMI 42.57 kg/m        Intake/Output Summary (Last 24 hours) at 3/22/2019 1457  Last data filed at 3/22/2019 1249  Gross per 24 hour   Intake 720 ml   Output 3300 ml   Net -2580 ml       Constitutional: Awake, alert, cooperative, no apparent distress   Respiratory: Clear to auscultation bilaterally, no crackles or wheezing   Cardiovascular: Regular rate and rhythm, normal S1 and S2, and no murmur noted   Abdomen: Normal bowel sounds, soft, non-distended, non-tender   Skin: No rashes, no cyanosis, dry to touch   Neuro: Alert and oriented x3, no weakness, numbness, memory loss   Extremities: No edema, normal range of motion   Other(s):        All other systems: Negative          Medications:      All current medications were reviewed with changes reflected in problem list.         Data:      All new lab and imaging data was reviewed.   Labs:  Recent Labs   Lab 03/22/19  0615   WBC 17.0*   HGB 9.4*   HCT 30.4*   MCV 85         Imaging:   No results found for this or any previous visit (from the past 24 hour(s)).   "

## 2019-03-23 ENCOUNTER — APPOINTMENT (OUTPATIENT)
Dept: PHYSICAL THERAPY | Facility: CLINIC | Age: 54
DRG: 856 | End: 2019-03-23
Payer: COMMERCIAL

## 2019-03-23 LAB — GLUCOSE BLDC GLUCOMTR-MCNC: 149 MG/DL (ref 70–99)

## 2019-03-23 PROCEDURE — 25000132 ZZH RX MED GY IP 250 OP 250 PS 637: Performed by: INTERNAL MEDICINE

## 2019-03-23 PROCEDURE — 25000128 H RX IP 250 OP 636: Performed by: INTERNAL MEDICINE

## 2019-03-23 PROCEDURE — 00000146 ZZHCL STATISTIC GLUCOSE BY METER IP

## 2019-03-23 PROCEDURE — 99232 SBSQ HOSP IP/OBS MODERATE 35: CPT | Performed by: INTERNAL MEDICINE

## 2019-03-23 PROCEDURE — 97110 THERAPEUTIC EXERCISES: CPT | Mod: GP | Performed by: PHYSICAL THERAPY ASSISTANT

## 2019-03-23 PROCEDURE — 25800030 ZZH RX IP 258 OP 636: Performed by: INTERNAL MEDICINE

## 2019-03-23 PROCEDURE — 12000000 ZZH R&B MED SURG/OB

## 2019-03-23 RX ORDER — FLUCONAZOLE 150 MG/1
150 TABLET ORAL ONCE
Status: COMPLETED | OUTPATIENT
Start: 2019-03-23 | End: 2019-03-23

## 2019-03-23 RX ADMIN — METOPROLOL TARTRATE 25 MG: 25 TABLET ORAL at 08:56

## 2019-03-23 RX ADMIN — OXYCODONE HYDROCHLORIDE 15 MG: 5 TABLET ORAL at 06:51

## 2019-03-23 RX ADMIN — FUROSEMIDE 40 MG: 40 TABLET ORAL at 08:56

## 2019-03-23 RX ADMIN — GABAPENTIN 600 MG: 600 TABLET, FILM COATED ORAL at 16:07

## 2019-03-23 RX ADMIN — LOSARTAN POTASSIUM 50 MG: 50 TABLET, FILM COATED ORAL at 08:57

## 2019-03-23 RX ADMIN — GABAPENTIN 600 MG: 600 TABLET, FILM COATED ORAL at 22:02

## 2019-03-23 RX ADMIN — DOCUSATE SODIUM 100 MG: 100 CAPSULE, LIQUID FILLED ORAL at 20:25

## 2019-03-23 RX ADMIN — OXYCODONE HYDROCHLORIDE 15 MG: 5 TABLET ORAL at 20:25

## 2019-03-23 RX ADMIN — ATORVASTATIN CALCIUM 10 MG: 10 TABLET, FILM COATED ORAL at 08:58

## 2019-03-23 RX ADMIN — SENNOSIDES AND DOCUSATE SODIUM 3 TABLET: 8.6; 5 TABLET ORAL at 20:25

## 2019-03-23 RX ADMIN — OXYCODONE HYDROCHLORIDE 15 MG: 5 TABLET ORAL at 00:54

## 2019-03-23 RX ADMIN — SENNOSIDES AND DOCUSATE SODIUM 3 TABLET: 8.6; 5 TABLET ORAL at 08:56

## 2019-03-23 RX ADMIN — OXYCODONE HYDROCHLORIDE 15 MG: 5 TABLET ORAL at 16:07

## 2019-03-23 RX ADMIN — CIPROFLOXACIN HYDROCHLORIDE 500 MG: 500 TABLET, FILM COATED ORAL at 08:56

## 2019-03-23 RX ADMIN — HEPARIN SODIUM 7500 UNITS: 5000 INJECTION, SOLUTION INTRAVENOUS; SUBCUTANEOUS at 20:25

## 2019-03-23 RX ADMIN — VITAMIN D, TAB 1000IU (100/BT) 1000 UNITS: 25 TAB at 08:56

## 2019-03-23 RX ADMIN — DAPTOMYCIN 500 MG: 500 INJECTION, POWDER, LYOPHILIZED, FOR SOLUTION INTRAVENOUS at 13:41

## 2019-03-23 RX ADMIN — BACLOFEN 10 MG: 10 TABLET ORAL at 08:57

## 2019-03-23 RX ADMIN — CIPROFLOXACIN HYDROCHLORIDE 500 MG: 500 TABLET, FILM COATED ORAL at 20:25

## 2019-03-23 RX ADMIN — METOPROLOL TARTRATE 25 MG: 25 TABLET ORAL at 20:26

## 2019-03-23 RX ADMIN — POTASSIUM CHLORIDE 20 MEQ: 1500 TABLET, EXTENDED RELEASE ORAL at 08:58

## 2019-03-23 RX ADMIN — BACLOFEN 10 MG: 10 TABLET ORAL at 22:02

## 2019-03-23 RX ADMIN — POLYETHYLENE GLYCOL 3350 17 G: 17 POWDER, FOR SOLUTION ORAL at 08:54

## 2019-03-23 RX ADMIN — FLUCONAZOLE 150 MG: 150 TABLET ORAL at 10:54

## 2019-03-23 RX ADMIN — OXYCODONE HYDROCHLORIDE 15 MG: 5 TABLET ORAL at 10:55

## 2019-03-23 RX ADMIN — BACLOFEN 10 MG: 10 TABLET ORAL at 16:07

## 2019-03-23 RX ADMIN — HEPARIN SODIUM 7500 UNITS: 5000 INJECTION, SOLUTION INTRAVENOUS; SUBCUTANEOUS at 08:58

## 2019-03-23 RX ADMIN — AMITRIPTYLINE HYDROCHLORIDE 100 MG: 50 TABLET, FILM COATED ORAL at 22:02

## 2019-03-23 RX ADMIN — GABAPENTIN 600 MG: 600 TABLET, FILM COATED ORAL at 08:56

## 2019-03-23 ASSESSMENT — ACTIVITIES OF DAILY LIVING (ADL)
ADLS_ACUITY_SCORE: 23

## 2019-03-23 NOTE — PLAN OF CARE
Ax2 lift to bedside commode  and chair  VS: sats 90% on R/A placed on 1.5L NC 93-95% over night  C/O pain Oxycodone 15 mg x2 given with relief  LS dim, denies SOB or chest pain.   BS active   Bobo draining large amounts  Mepilex on coccyx is C/DI  WOC, ID,PT,OT    Awaiting TCU placement

## 2019-03-23 NOTE — PLAN OF CARE
Alert and oriented, Ax2 lift.   VSS, RA, oxycodone x1 for pain.   LS clear, denies SOB.   BS active x4, passing flatus, no stools, scheduled miralax & senna.   Bobo patent, good output.   Vaginal discharge noted--one time dose diflucan given.   Mepilex to coccyx CDI, q2h repo, air mattress.   PICC saline locked, continues on PO cipro and IV dapto.   Discharge pending.   Will continue to monitor.

## 2019-03-23 NOTE — PROGRESS NOTES
[facility]  Hospitalist Progress Note  Mo Javed MD 03/23/2019    Reason for Stay (Diagnosis): sepsis         Assessment and Plan:      Summary of Stay: Amanda Richardson is a 53 year old female who was admitted on 3/16/2019.  Past medical history is complex with frequent hospitalizations. Recently discharged 2/15/19 after a 2 week hospitalization for septic shock and respiratory failure (discharged at that time on 3L home O2). Patient subsequently underwent outpatient urologic procedure 3/15/19 with lithotripsy and left ureteral stent exchange with Dr. Chauhan under general anesthesia.  Patient remained lethargic following procedure.  Patient was ultimately hypoxic (72% on room air) at TCU the day following the procedure and taken to the emergency department where she was ultimately found to be septic and emergently intubated.  Patient admitted to the ICU for further evaluation and treatment.     Patient was felt to have sepsis related to infected ureteral stone.  Urine culture grew out Pseudomonas.  She has responded well to antibiotics.  She has since been extubated and transferred out of the intensive care unit.  medically she appears stable for discharge to a TCU when bed is found, with plan for follow-up with urology next week for stent removal.     Septic shock secondary to ureteral stone with lithotripsy and left ureteral stent exchange - sepsis resolved  Urine culture growing ,000 Pseudomonas which is pansensitive, as well as < 10k VRE.  Infectious disease following.  They recommend total of 2-week course of antibiotic.  She is completed 1 week thus far; anticipate discharge with oral ciprofloxacin to complete course.  Given additional finding of VRE, daptomycin has been started as well with ID recommending to continue while inpatient and stop at discharge.  Continue Bobo for maximal urinary decompression.  Urology plans to take out stent next week with Dr. Chauhan.    Would expect that Bobo can be removed at that time as well     Acute respiratory failure secondary to severe sepsis - resolved  Metabolic encephalopathy secondary to sepsis  Hypoxic and hypercapneic respiratory failure - resolved  Patient required emergent intubation in the ER and was extubated 3/17/19.  Continue to wean oxygen as able.  Only on 1 L currently.     History of hypertension  PTA medications initially on hold given septic shock on admission; now resumed.    Currently on lasix 40 mg daily.   Will continue metoprolol 50 mg PO daily (but as 25 mg BID for easier titration initially) and losartan 50 mg daily as renal function at baseline.  Will continue to follow closely.     Acute kidney injury, resolved  Renal function has improved to baseline.  Patient with good urine output.  Restarted PTA Lasix 40 mg p.o. daily.  Restarted PTA losartan 50 mg daily .  Renally dose medications and avoid nephrotoxins.     History of multiple sclerosis  Chronic pain  Resumed baclofen, PTA narcotics, PTA Neurontin, PTA amitriptyline.   As long as patient discharges in the coming few days she will have enough baclofen and baclofen pump.    My colleague has already discussed with pharmacy, and there should be enough baclofen in reservoir to last at least until 4/5/19, though refill will need to be planned for after discharge.     Type 2 diabetes mellitus  Hemoglobin A1c 6.5%.  PTA metformin on hold.  Diabetic diet.  AC/HS glucose checks with sliding scale insulin  Have been ordered, but as patient has not had any insulin requirements, will discontinue AC/HS checks at this time.      Hyperlipidemia  Continue PTA statin.     DVT Prophylaxis: subQ heparin  Code Status: Full Code  Expected discharge: To TCU when bed is available.  Medically stable for discharge.  Apparently we are waiting for insurance information for coverage on TCU          Interval History (Subjective):      No complaints, feels well.  Awaiting TCU bed          "         Physical Exam:      Last Vital Signs:  /53 (BP Location: Left arm)   Pulse 92   Temp 96.6  F (35.9  C) (Oral)   Resp 20   Ht 1.702 m (5' 7\")   Wt 123.3 kg (271 lb 12.8 oz)   LMP 12/11/2011   SpO2 96%   BMI 42.57 kg/m        Intake/Output Summary (Last 24 hours) at 3/23/2019 1401  Last data filed at 3/23/2019 0948  Gross per 24 hour   Intake 700 ml   Output 4025 ml   Net -3325 ml       Constitutional: Awake, alert, cooperative, no apparent distress   Respiratory: Clear to auscultation bilaterally, no crackles or wheezing   Cardiovascular: Regular rate and rhythm, normal S1 and S2, and no murmur noted   Abdomen: Normal bowel sounds, soft, non-distended, non-tender   Skin: No rashes, no cyanosis, dry to touch   Neuro: Alert and oriented x3, no weakness, numbness, memory loss   Extremities: No edema, normal range of motion   Other(s):        All other systems: Negative          Medications:      All current medications were reviewed with changes reflected in problem list.         Data:      All new lab and imaging data was reviewed.   Labs:  Recent Labs   Lab 03/22/19  0615 03/21/19  0540 03/20/19  0545   WBC 17.0* 14.0* 11.9*   HGB 9.4* 9.4* 9.0*   HCT 30.4* 30.4* 29.3*   MCV 85 85 86    267 248      Imaging:   No results found for this or any previous visit (from the past 24 hour(s)).   "

## 2019-03-23 NOTE — PLAN OF CARE
Discharge Planner PT   Patient plan for discharge:    TCU  Current status:seated ex as up in chair via lift, awaiting TCU auth per her. Declined standing trial this date.  Barriers to return to prior living situation: Level of assist with mobility   Recommendations for discharge: TCU per plan established by the PT.   Rationale for recommendations: Pt will require TCU stay to improve LE strength and transfers as pt below baseline at this time.        Entered by: Montserrat Joyce 03/23/2019 8:36 AM

## 2019-03-24 ENCOUNTER — APPOINTMENT (OUTPATIENT)
Dept: OCCUPATIONAL THERAPY | Facility: CLINIC | Age: 54
DRG: 856 | End: 2019-03-24
Payer: COMMERCIAL

## 2019-03-24 PROCEDURE — 25000128 H RX IP 250 OP 636: Performed by: INTERNAL MEDICINE

## 2019-03-24 PROCEDURE — 25000132 ZZH RX MED GY IP 250 OP 250 PS 637: Performed by: INTERNAL MEDICINE

## 2019-03-24 PROCEDURE — 97110 THERAPEUTIC EXERCISES: CPT | Mod: GO | Performed by: OCCUPATIONAL THERAPIST

## 2019-03-24 PROCEDURE — 12000000 ZZH R&B MED SURG/OB

## 2019-03-24 PROCEDURE — 99232 SBSQ HOSP IP/OBS MODERATE 35: CPT | Performed by: INTERNAL MEDICINE

## 2019-03-24 PROCEDURE — 25800030 ZZH RX IP 258 OP 636: Performed by: INTERNAL MEDICINE

## 2019-03-24 RX ADMIN — OXYCODONE HYDROCHLORIDE 15 MG: 5 TABLET ORAL at 00:41

## 2019-03-24 RX ADMIN — DAPTOMYCIN 500 MG: 500 INJECTION, POWDER, LYOPHILIZED, FOR SOLUTION INTRAVENOUS at 13:13

## 2019-03-24 RX ADMIN — POTASSIUM CHLORIDE 20 MEQ: 1500 TABLET, EXTENDED RELEASE ORAL at 09:10

## 2019-03-24 RX ADMIN — BACLOFEN 10 MG: 10 TABLET ORAL at 09:09

## 2019-03-24 RX ADMIN — CIPROFLOXACIN HYDROCHLORIDE 500 MG: 500 TABLET, FILM COATED ORAL at 09:10

## 2019-03-24 RX ADMIN — OXYCODONE HYDROCHLORIDE 15 MG: 5 TABLET ORAL at 06:09

## 2019-03-24 RX ADMIN — HEPARIN SODIUM 7500 UNITS: 5000 INJECTION, SOLUTION INTRAVENOUS; SUBCUTANEOUS at 09:11

## 2019-03-24 RX ADMIN — LOSARTAN POTASSIUM 50 MG: 50 TABLET, FILM COATED ORAL at 09:10

## 2019-03-24 RX ADMIN — METOPROLOL TARTRATE 25 MG: 25 TABLET ORAL at 21:46

## 2019-03-24 RX ADMIN — BACLOFEN 10 MG: 10 TABLET ORAL at 16:26

## 2019-03-24 RX ADMIN — BACLOFEN 10 MG: 10 TABLET ORAL at 14:11

## 2019-03-24 RX ADMIN — METOPROLOL TARTRATE 25 MG: 25 TABLET ORAL at 09:08

## 2019-03-24 RX ADMIN — HEPARIN SODIUM 7500 UNITS: 5000 INJECTION, SOLUTION INTRAVENOUS; SUBCUTANEOUS at 19:54

## 2019-03-24 RX ADMIN — SENNOSIDES AND DOCUSATE SODIUM 3 TABLET: 8.6; 5 TABLET ORAL at 09:08

## 2019-03-24 RX ADMIN — BACLOFEN 10 MG: 10 TABLET ORAL at 21:46

## 2019-03-24 RX ADMIN — POLYETHYLENE GLYCOL 3350 17 G: 17 POWDER, FOR SOLUTION ORAL at 09:07

## 2019-03-24 RX ADMIN — OXYCODONE HYDROCHLORIDE 15 MG: 5 TABLET ORAL at 16:25

## 2019-03-24 RX ADMIN — CIPROFLOXACIN HYDROCHLORIDE 500 MG: 500 TABLET, FILM COATED ORAL at 19:55

## 2019-03-24 RX ADMIN — OXYCODONE HYDROCHLORIDE 15 MG: 5 TABLET ORAL at 11:28

## 2019-03-24 RX ADMIN — GABAPENTIN 600 MG: 600 TABLET, FILM COATED ORAL at 16:26

## 2019-03-24 RX ADMIN — GABAPENTIN 600 MG: 600 TABLET, FILM COATED ORAL at 09:10

## 2019-03-24 RX ADMIN — VITAMIN D, TAB 1000IU (100/BT) 1000 UNITS: 25 TAB at 09:09

## 2019-03-24 RX ADMIN — AMITRIPTYLINE HYDROCHLORIDE 100 MG: 50 TABLET, FILM COATED ORAL at 21:46

## 2019-03-24 RX ADMIN — GABAPENTIN 600 MG: 600 TABLET, FILM COATED ORAL at 21:46

## 2019-03-24 RX ADMIN — SENNOSIDES AND DOCUSATE SODIUM 3 TABLET: 8.6; 5 TABLET ORAL at 21:46

## 2019-03-24 RX ADMIN — FUROSEMIDE 40 MG: 40 TABLET ORAL at 09:10

## 2019-03-24 RX ADMIN — OXYCODONE HYDROCHLORIDE 15 MG: 5 TABLET ORAL at 22:49

## 2019-03-24 RX ADMIN — ATORVASTATIN CALCIUM 10 MG: 10 TABLET, FILM COATED ORAL at 09:10

## 2019-03-24 ASSESSMENT — ACTIVITIES OF DAILY LIVING (ADL)
ADLS_ACUITY_SCORE: 23

## 2019-03-24 NOTE — PROGRESS NOTES
[facility]  Hospitalist Progress Note  Mo Javed MD 03/24/2019    Reason for Stay (Diagnosis): sepsis, UTI         Assessment and Plan:      Summary of Stay: Amanda Richardson is a 53 year old female who was admitted on 3/16/2019.  Past medical history is complex with frequent hospitalizations. Recently discharged 2/15/19 after a 2 week hospitalization for septic shock and respiratory failure (discharged at that time on 3L home O2). Patient subsequently underwent outpatient urologic procedure 3/15/19 with lithotripsy and left ureteral stent exchange with Dr. Chauhan under general anesthesia.  Patient remained lethargic following procedure.  Patient was ultimately hypoxic (72% on room air) at TCU the day following the procedure and taken to the emergency department where she was ultimately found to be septic and emergently intubated.  Patient admitted to the ICU for further evaluation and treatment.     Patient was felt to have sepsis related to infected ureteral stone.  Urine culture grew out Pseudomonas.  She has responded well to antibiotics.  She has since been extubated and transferred out of the intensive care unit.  medically she appears stable for discharge to a TCU when bed is found, with plan for follow-up with urology next week for stent removal.     Septic shock secondary to ureteral stone with lithotripsy and left ureteral stent exchange - sepsis resolved  Urine culture growing ,000 Pseudomonas which is pansensitive, as well as < 10k VRE.  Infectious disease following.  They recommend total of 2-week course of antibiotic.  She is completed approx 1 week thus far; anticipate discharge with oral ciprofloxacin to complete course.  Given additional finding of VRE, daptomycin has been started as well with ID recommending to continue while inpatient and stop at discharge.  Continue Bobo for maximal urinary decompression.  Urology plans to take out stent next week with   "Tien.   Would expect that Bobo can be removed at that time as well     Acute respiratory failure secondary to severe sepsis - resolved  Metabolic encephalopathy secondary to sepsis - resolved  Hypoxic and hypercapneic respiratory failure - resolved  Patient required emergent intubation in the ER and was extubated 3/17/19.  Wean off oxygen     History of hypertension  PTA medications initially on hold given septic shock on admission; now resumed.    Currently on lasix 40 mg daily.   Will continue metoprolol 50 mg PO daily (but as 25 mg BID for easier titration initially) and losartan 50 mg daily as renal function at baseline.     Acute kidney injury, resolved  Renal function has improved to baseline.  Patient with good urine output.  Restarted PTA Lasix 40 mg p.o. daily.  Restarted PTA losartan 50 mg daily .  Renally dose medications and avoid nephrotoxins.     History of multiple sclerosis  Chronic pain  Resumed baclofen, PTA narcotics, PTA Neurontin, PTA amitriptyline.   As long as patient discharges in the coming few days she will have enough baclofen and baclofen pump.    My colleague has already discussed with pharmacy, and there should be enough baclofen in reservoir to last at least until 4/5/19, though refill will need to be planned for after discharge.     Type 2 diabetes mellitus  Hemoglobin A1c 6.5%.  PTA metformin on hold.  Diabetic diet.     Hyperlipidemia  Continue PTA statin.     DVT Prophylaxis: subQ heparin  Code Status: Full Code  Expected discharge: To TCU when bed is available.  Medically stable for discharge.  Apparently we are waiting for insurance information for coverage on TCU            Interval History (Subjective):      No complaints.  No shortness of breath.                  Physical Exam:      Last Vital Signs:  /52   Pulse 92   Temp 96.9  F (36.1  C) (Oral)   Resp 18   Ht 1.702 m (5' 7\")   Wt 123.3 kg (271 lb 12.8 oz)   LMP 12/11/2011   SpO2 100%   BMI 42.57 kg/m  "       Intake/Output Summary (Last 24 hours) at 3/24/2019 1359  Last data filed at 3/24/2019 1349  Gross per 24 hour   Intake 400 ml   Output 6025 ml   Net -5625 ml       Constitutional: Awake, alert, cooperative, no apparent distress.  Sitting in chair at bedside.   Respiratory: Clear to auscultation bilaterally, no crackles or wheezing   Cardiovascular: Regular rate and rhythm, normal S1 and S2, and no murmur noted   Abdomen: Normal bowel sounds, soft, non-distended, non-tender   Skin: No rashes, no cyanosis, dry to touch   Neuro: Alert and oriented x3, no weakness, numbness, memory loss   Extremities: No edema, normal range of motion   Other(s):        All other systems: Negative          Medications:      All current medications were reviewed with changes reflected in problem list.         Data:      All new lab and imaging data was reviewed.   Labs:  Recent Labs   Lab 03/22/19  0615   WBC 17.0*   HGB 9.4*   HCT 30.4*   MCV 85         Imaging:   No results found for this or any previous visit (from the past 24 hour(s)).

## 2019-03-24 NOTE — PLAN OF CARE
Pt A&O x4. VSS. Afebrile. RA. Tx: PO/IV antibiotics. Bobo in place with adequate output. Oxycodone given x2 for pain with relief. Up to commode x2, assist of two with lift. Turning q2h in bed. PT, WOC following. Discharge to TCU TBD.

## 2019-03-24 NOTE — PLAN OF CARE
Discharge Planner OT   Patient plan for discharge: TCU  Current status: OT provided modeling and strategies to adjust resistance with band or use of no band to address soreness in L UE.  Pt. able to tolerate 1 set of B UE red resistive band exercises for 10 reps for 10 exercises.  Pt. able to verbalize how to modify reps, time of day, and resistance to assist with soreness and energy levels.  Pt. appreciative of ideas.  Barriers to return to prior living situation: Pt mod I with squat pivot transfers at baseline, level of assistance for mobility and transfers  Recommendations for discharge: TCU  Rationale for recommendations: Pt will  benefit from returning to TCU to continue to address strength deficits to maximize independence with functional mobility and ADL's prior to returning home.         Entered by: Tita Buenrostro 03/24/2019 12:01 PM

## 2019-03-24 NOTE — PLAN OF CARE
Ax2 lift to bedside commode  and chair  VS: sats 87% on R/A placed on 2L NC 95% over night  C/O pain Oxycodone 15 mg x2 given with relief  LS dim, denies SOB or chest pain.   BS active   Continues on Daptomycin and PO Cipro  Bobo  draining large amounts  Mepilex on coccyx is C/DI  WOC, ID,PT,OT    Awaiting TCU placement

## 2019-03-24 NOTE — PLAN OF CARE
A&Ox4, Ax2 lift.   VSS, on 1 L O2 most of shift, weaned off now.   LS clear, denies SOB.   BS active x4, had bm smear x2.   Dressings on coccyx CDI.   Oxycodone x1, baclofen x1 for muscle spasms.   Discharge pending.   Will continue to monitor.

## 2019-03-25 ENCOUNTER — PATIENT OUTREACH (OUTPATIENT)
Dept: CARE COORDINATION | Facility: CLINIC | Age: 54
End: 2019-03-25

## 2019-03-25 ENCOUNTER — APPOINTMENT (OUTPATIENT)
Dept: PHYSICAL THERAPY | Facility: CLINIC | Age: 54
DRG: 856 | End: 2019-03-25
Payer: COMMERCIAL

## 2019-03-25 LAB — PLATELET # BLD AUTO: 399 10E9/L (ref 150–450)

## 2019-03-25 PROCEDURE — 36415 COLL VENOUS BLD VENIPUNCTURE: CPT | Performed by: INTERNAL MEDICINE

## 2019-03-25 PROCEDURE — 25000132 ZZH RX MED GY IP 250 OP 250 PS 637: Performed by: INTERNAL MEDICINE

## 2019-03-25 PROCEDURE — 25800030 ZZH RX IP 258 OP 636: Performed by: INTERNAL MEDICINE

## 2019-03-25 PROCEDURE — 12000000 ZZH R&B MED SURG/OB

## 2019-03-25 PROCEDURE — 25000128 H RX IP 250 OP 636: Performed by: INTERNAL MEDICINE

## 2019-03-25 PROCEDURE — 97530 THERAPEUTIC ACTIVITIES: CPT | Mod: GP | Performed by: PHYSICAL THERAPIST

## 2019-03-25 PROCEDURE — 99239 HOSP IP/OBS DSCHRG MGMT >30: CPT | Performed by: INTERNAL MEDICINE

## 2019-03-25 PROCEDURE — 85049 AUTOMATED PLATELET COUNT: CPT | Performed by: INTERNAL MEDICINE

## 2019-03-25 PROCEDURE — 97110 THERAPEUTIC EXERCISES: CPT | Mod: GP | Performed by: PHYSICAL THERAPIST

## 2019-03-25 RX ORDER — OXYCODONE HYDROCHLORIDE 15 MG/1
7.5-15 TABLET ORAL EVERY 4 HOURS PRN
Qty: 20 TABLET | Refills: 0 | Status: SHIPPED | OUTPATIENT
Start: 2019-03-25 | End: 2019-04-03

## 2019-03-25 RX ORDER — CIPROFLOXACIN 500 MG/1
500 TABLET, FILM COATED ORAL EVERY 12 HOURS
DISCHARGE
Start: 2019-03-25 | End: 2019-03-25

## 2019-03-25 RX ORDER — CIPROFLOXACIN 500 MG/1
500 TABLET, FILM COATED ORAL EVERY 12 HOURS
DISCHARGE
Start: 2019-03-25 | End: 2019-04-04

## 2019-03-25 RX ORDER — AMOXICILLIN 250 MG
3 CAPSULE ORAL 2 TIMES DAILY
Qty: 180 TABLET | Refills: 0 | DISCHARGE
Start: 2019-03-25 | End: 2019-06-10

## 2019-03-25 RX ORDER — BISACODYL 10 MG
10 SUPPOSITORY, RECTAL RECTAL DAILY PRN
DISCHARGE
Start: 2019-03-25 | End: 2019-06-10

## 2019-03-25 RX ORDER — CEFAZOLIN SODIUM IN 0.9 % NACL 3 G/100 ML
3 INTRAVENOUS SOLUTION, PIGGYBACK (ML) INTRAVENOUS
Status: CANCELLED | OUTPATIENT
Start: 2019-03-25

## 2019-03-25 RX ORDER — CEFAZOLIN SODIUM 1 G/3ML
1 INJECTION, POWDER, FOR SOLUTION INTRAMUSCULAR; INTRAVENOUS SEE ADMIN INSTRUCTIONS
Status: CANCELLED | OUTPATIENT
Start: 2019-03-25

## 2019-03-25 RX ADMIN — BACLOFEN 10 MG: 10 TABLET ORAL at 15:18

## 2019-03-25 RX ADMIN — OXYCODONE HYDROCHLORIDE 15 MG: 5 TABLET ORAL at 14:32

## 2019-03-25 RX ADMIN — BACLOFEN 10 MG: 10 TABLET ORAL at 21:03

## 2019-03-25 RX ADMIN — AMITRIPTYLINE HYDROCHLORIDE 100 MG: 50 TABLET, FILM COATED ORAL at 21:04

## 2019-03-25 RX ADMIN — POLYETHYLENE GLYCOL 3350 17 G: 17 POWDER, FOR SOLUTION ORAL at 08:52

## 2019-03-25 RX ADMIN — LOSARTAN POTASSIUM 50 MG: 50 TABLET, FILM COATED ORAL at 08:53

## 2019-03-25 RX ADMIN — METOPROLOL TARTRATE 25 MG: 25 TABLET ORAL at 08:52

## 2019-03-25 RX ADMIN — HEPARIN SODIUM 7500 UNITS: 5000 INJECTION, SOLUTION INTRAVENOUS; SUBCUTANEOUS at 21:02

## 2019-03-25 RX ADMIN — HEPARIN SODIUM 7500 UNITS: 5000 INJECTION, SOLUTION INTRAVENOUS; SUBCUTANEOUS at 08:53

## 2019-03-25 RX ADMIN — OXYCODONE HYDROCHLORIDE 15 MG: 5 TABLET ORAL at 04:32

## 2019-03-25 RX ADMIN — VITAMIN D, TAB 1000IU (100/BT) 1000 UNITS: 25 TAB at 08:53

## 2019-03-25 RX ADMIN — CIPROFLOXACIN HYDROCHLORIDE 500 MG: 500 TABLET, FILM COATED ORAL at 08:52

## 2019-03-25 RX ADMIN — GABAPENTIN 600 MG: 600 TABLET, FILM COATED ORAL at 21:04

## 2019-03-25 RX ADMIN — GABAPENTIN 600 MG: 600 TABLET, FILM COATED ORAL at 15:18

## 2019-03-25 RX ADMIN — OXYCODONE HYDROCHLORIDE 15 MG: 5 TABLET ORAL at 09:05

## 2019-03-25 RX ADMIN — SENNOSIDES AND DOCUSATE SODIUM 1 TABLET: 8.6; 5 TABLET ORAL at 21:03

## 2019-03-25 RX ADMIN — CIPROFLOXACIN HYDROCHLORIDE 500 MG: 500 TABLET, FILM COATED ORAL at 21:04

## 2019-03-25 RX ADMIN — DAPTOMYCIN 500 MG: 500 INJECTION, POWDER, LYOPHILIZED, FOR SOLUTION INTRAVENOUS at 14:32

## 2019-03-25 RX ADMIN — METOPROLOL TARTRATE 25 MG: 25 TABLET ORAL at 21:03

## 2019-03-25 RX ADMIN — POTASSIUM CHLORIDE 20 MEQ: 1500 TABLET, EXTENDED RELEASE ORAL at 08:53

## 2019-03-25 RX ADMIN — BACLOFEN 10 MG: 10 TABLET ORAL at 08:52

## 2019-03-25 RX ADMIN — GABAPENTIN 600 MG: 600 TABLET, FILM COATED ORAL at 08:52

## 2019-03-25 RX ADMIN — ATORVASTATIN CALCIUM 10 MG: 10 TABLET, FILM COATED ORAL at 08:52

## 2019-03-25 RX ADMIN — OXYCODONE HYDROCHLORIDE 15 MG: 5 TABLET ORAL at 21:03

## 2019-03-25 RX ADMIN — SENNOSIDES AND DOCUSATE SODIUM 2 TABLET: 8.6; 5 TABLET ORAL at 08:52

## 2019-03-25 RX ADMIN — FUROSEMIDE 40 MG: 40 TABLET ORAL at 08:53

## 2019-03-25 ASSESSMENT — ACTIVITIES OF DAILY LIVING (ADL)
ADLS_ACUITY_SCORE: 23

## 2019-03-25 NOTE — PROGRESS NOTES
Discharge Planner   Discharge Plans in progress: [facility]. [facility] stated that they need to see PT notes before 12:00 today before they would consider [facility].. [facility] from 2 days ago. [facility] wants more current note.   Barriers to discharge plan: PT not on PT schedule. Paged a couple PT and Charge RN was going to call their schedule dept   Follow up plan: Will send PT note ASAP to TCU        Entered by: Corinne C. White 03/25/2019 10:46 AM        CM: Called TCU today at 1600 to check ins status. Still waiting on Auth

## 2019-03-25 NOTE — PLAN OF CARE
Discharge Planner PT   Patient plan for discharge: TCU  Current status:  Pt up in chair with assist from nsg using the ceiling lift.  Seated B hamstring and gastroc stretch to prepare for weight bearing through LEs.  Pt able to scoot to edge of chair independently.  Sit <> partial stance with B UEs on chair arm rests and Min A at trunk for balance.  Pt tolerated partial stance position for 30 sec, 10 sec and 20 sec.  Pt also performed seated LE exercises to increase strength and ROM for improved independence and ease with functional mobility.  Barriers to return to prior living situation: Level of assist with mobility  Recommendations for discharge: TCU  Rationale for recommendations: Pt will require continued PT (and OT) in the TCU setting to improve LE strength and independence with  transfers as pt below baseline at this time.         Entered by: Debra Crandall 03/25/2019 11:56 AM

## 2019-03-25 NOTE — DISCHARGE SUMMARY
Admit Date:     03/16/2019   Discharge Date:           CHIEF COMPLAINTS:      1.  Septic shock secondary to infected left ureteral stone.  Urine culture growing pseudomonas this admission.  Urine culture also growing out VRE.  The patient was treated with ciprofloxacin and daptomycin this admission.  Plan is for a followup with Dr. Chauhan of Urology for removal of ureteral stent on 03/29/2019.   2.  Acute respiratory failure secondary to severe sepsis requiring intubation and mechanical ventilation, resolved.   3.  Septic encephalopathy this admission.   4.  Acute renal failure secondary to sepsis.      PAST MEDICAL HISTORY:   1.  Recent 2-week hospitalization for septic shock and respiratory failure with discharge on 02/15/2019.  The patient subsequently underwent outpatient urologic procedure on 03/15/2019 with lithotripsy and left ureteral stent exchange for left ureteral stone.  Shortly after that procedure, the patient became lethargic and hypoxic and was brought to the ER for evaluation.  She was subsequently evaluated, which led to her current admission.   2.  Hypertension history.   3.  History of multiple sclerosis.  She has a chronic baclofen pump.   4.  Chronic pain syndrome.   5.  Type 2 diabetes mellitus, on metformin.   6.  Hyperlipidemia.      PRINCIPAL PROCEDURES THIS ADMISSION:   1.  Infection Disease consultation.   2.  Urology consultation.   3.  IV antibiotics.   4.  Urine culture growing out ,000 colonies of pseudomonas as well as less than 10,000 colonies of Enterococcus.  Enterococcus was sensitive to linezolid, but resistant to vancomycin and penicillin.  Pseudomonas was pansensitive.      REASON FOR ADMISSION:  Please see dictated history and physical.  In brief, Ms. Richardson is a 53-year-old female with the above medical history who was recently hospitalized after a 2-week hospital stay for septic shock and respiratory failure.  She was discharged 02/15/2019.  It was felt that the  septic shock was due to a urinary tract infection.  During that admission to the hospital, Urology was consulted and underwent ureteral stent placement.  Blood and urine cultures were positive for E. coli that admission.      The patient went to see Dr. Chauhan of Urology on 03/15/2019 for lithotripsy and left ureteral stent exchange.  After the procedure, the patient was somewhat lethargic and the following day, she was found to be hypoxic at her transitional care unit.  She was subsequently brought back to the ER for evaluation and felt to have septic shock.  She was admitted to the Intensive Care Unit and intubated for respiratory failure and sepsis.      HOSPITAL COURSE:  Septic shock:  Felt secondary to infected ureteral stone.  Symptoms started after lithotripsy and ureteral stent exchange.  Urine culture grew out Pseudomonas as well as enterococcus.  The patient was treated with antibiotics while hospitalized with subsequent improvement.  She was successfully extubated and transitioned from the ICU to the medical floor.  By day of discharge, sepsis had resolved, she was afebrile, and weaned off of oxygen.  Plans are for discharge to a transitional care unit.  She will see Dr. Chauhan on 03/29/2019 for ureteral stent removal.  Infectious Disease is recommending 5 more days of oral antibiotics to complete her therapy for sepsis.  She will be discharged on oral ciprofloxacin to cover for Pseudomonas.  She has also history of daptomycin while hospitalized for a small amount of enterococcus found in urine, but I doubt that this is the pathogen that led to her sepsis and Infectious Disease felt comfortable stopping her daptomycin at time of discharge from the hospital.      DISCHARGE MEDICATIONS:   1.  Amitriptyline 100 mg at bedtime.   2.  Atorvastatin 10 mg daily.   3.  Baclofen 10 mg 3 times a day.   4.  Baclofen 10 mg daily as needed for muscle spasm.   5.  Baclofen intrathecal pump.  Managed by [facility].   6.  Bisacodyl 10 mg rectally daily as needed for constipation.   7.  Cholecalciferol 1000 units by mouth daily.   8.  Ciprofloxacin 500 mg every 12 hours for 5 more days to complete therapy for sepsis.   9.  Docusate 100 mg twice a day as needed for constipation.   10.  Lasix 40 mg daily.   11.  Gabapentin 600 mg 3 times a day.   12.  Hydrochlorothiazide 12.5 mg daily.   13.  Losartan 50 mg daily.   14.  Metformin 500 mg daily with dinner.   15.  Metoprolol extended release 50 mg daily.   16.  Omega-3 fatty acid 1200 mg daily.   17.  Oxycodone 7.5-15 mg every 4 hours as needed for pain.  Chronic medication.   18.  MiraLax 17 grams daily.   19.  MiraLax 1 packet by mouth twice a day as needed for constipation.   20.  Potassium chloride extended release 20 mEq daily.   21.  Senokot 3 tablets by mouth twice a day.      FOLLOWUP INSTRUCTIONS:   1.  Follow up for ureteral stent removal with Dr. Chauhan of Urology on 2019.   2.  The patient will require a refill of her baclofen pump later this week after discharge from the hospital.  This is managed by [facility].  We will include phone number in discharge paperwork to help facilitate this.  Our social workers are aware as well and we will alert TCU of this need.        I anticipate discharge to TCU when bed is available.  We are awaiting insurance authorization for TCU placement.        Examined the patient today.  I spent greater than 30 minutes facilitating discharge of this patient today.         VILMA TANG MD             D: 2019   T: 2019   MT: KADEN      Name:     JIMYM SYLVESTER   MRN:      [id number]        Account:        [id number]   :      1965           Admit Date:     2019                                  Discharge Date:       Document: [id number]

## 2019-03-25 NOTE — PROGRESS NOTES
Pt seen and examined today.  Exam remains unchanged.  No complaints.  Await TCU    Discharge orders completed and discharge summary dictated in anticipation of discharge to TCU once insurance authorization is completed    Dictated discharge summary today will serve as my daily progress note

## 2019-03-25 NOTE — PROGRESS NOTES
"[facility]  Infectious Disease Progress Note          Assessment and Plan:     ASSESSMENT:  1. 52 yo female acute SEPTIC SHOCK-resolved,  BC negative, UC  With  P.aeruginosa,  Onset after  L ureteroscopy,  Doing well,  JULIANNE  Resolved, off pressors  2. UTI,  UROSEPSIS-Pseudomonas aeruginosa,   SUSY pansens  3. JULIANNE-Resolved  4.  OBESITY  5.  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 H/O T CELL  LYMPHOMA     REC  1.  Continue cipro , po OK  at 500 bid , plan 2 weeks total ABX, day 10/14 today  2 Would normally ignore VRE UC but with uriary manipulation give a bit of dapto(linezolid drug interactions so avoid),continue while here any way, but stop at disposition          Interval History:   no new complaints feels better out of ICU; no cp, sob, n/v/d, or abd pain. Chronic R leg pain, no other pain site  cxs as above  cx also low ct VRE nares MRSA still here disposition issue              Medications:       amitriptyline  100 mg Oral At Bedtime     atorvastatin  10 mg Oral Daily     baclofen  10 mg Oral TID     ciprofloxacin  500 mg Oral Q12H JOSE     DAPTOmycin (CUBICIN) intermittent infusion  4 mg/kg Intravenous Q24H     furosemide  40 mg Oral Daily     gabapentin  600 mg Oral TID     heparin  7,500 Units Subcutaneous Q12H     losartan  50 mg Oral Daily     metoprolol tartrate  25 mg Oral BID     polyethylene glycol  17 g Oral Daily     potassium chloride ER  20 mEq Oral Daily     senna-docusate  3 tablet Oral BID     sodium chloride (PF)  10 mL Intracatheter Q7 Days     vitamin D3  1,000 Units Oral Daily                  Physical Exam:   Blood pressure 139/44, pulse 92, temperature 97  F (36.1  C), temperature source Oral, resp. rate 20, height 1.702 m (5' 7\"), weight 123.3 kg (271 lb 12.8 oz), last menstrual period 12/11/2011, SpO2 96 %, not currently breastfeeding.  Wt Readings from Last 2 Encounters:   03/19/19 123.3 kg (271 lb 12.8 oz)   03/15/19 112.9 kg (249 lb)     Vital Signs with " Ranges  Temp:  [96.5  F (35.8  C)-99.5  F (37.5  C)] 97  F (36.1  C)  Heart Rate:  [79-95] 79  Resp:  [16-20] 20  BP: (128-141)/(40-63) 139/44  SpO2:  [91 %-96 %] 96 %    Constitutional: Awake, alert, cooperative, no apparent distress cognition Ok   Lungs: Clear to auscultation bilaterally, no crackles or wheezing   Cardiovascular: Regular rate and rhythm, normal S1 and S2, and no murmur noted   Abdomen: Normal bowel sounds, soft, non-distended, non-tender   Skin: No rashes, no cyanosis, no edema   Other:           Data:   All microbiology laboratory data reviewed.  Recent Labs   Lab Test 03/25/19  0753 03/22/19  0615 03/21/19  0540 03/20/19  0545   WBC  --  17.0* 14.0* 11.9*   HGB  --  9.4* 9.4* 9.0*   HCT  --  30.4* 30.4* 29.3*   MCV  --  85 85 86    266 267 248     Recent Labs   Lab Test 03/22/19  0615 03/21/19  0540 03/20/19  0545   CR 0.63 0.65 0.64     Recent Labs   Lab Test 11/01/17  1623   SED 39*     Recent Labs   Lab Test 03/16/19  1550 03/16/19  1455 03/16/19  1222 03/16/19  1200 02/04/19  2130 01/31/19  0410 01/30/19  2120 01/30/19  2016 01/30/19  1522   CULT No growth Methicillin resistant Staphylococcus aureus (MRSA) isolated  This isolate DOES NOT demonstrate inducible clindamycin resistance in vitro. Clindamycin   is susceptible and could be used when indicated, however, erythromycin is resistant and   should not be used.  *  Olive Donald RN 5th floor notified of MRSA 3/20/19 0800 dg 50,000 to 100,000 colonies/mL  Pseudomonas aeruginosa  *  <10,000 colonies/mL  Enterococcus faecium (VRE)  *  Critical Value/Significant Value, preliminary result only, called to and read back by  Merline Bryan RN at [facility] MS5 at 2:30pm 3/18/2019 (MC)   No growth Cultured on the 2nd day of incubation:  Staphylococcus epidermidis  *  Critical Value/Significant Value, preliminary result only, called to and read back by  Claudia Marsh, FREDO 9037 2/6/19. MS    (Note)  POSITIVE for STAPHYLOCOCCUS EPIDERMIDIS  and POSITIVE for the mecA  gene (resistant to methicillin) by Verigene multiplex nucleic acid  test. Final identification and antimicrobial susceptibility testing  will be verified by standard methods.      Specimen tested with Verigene multiplex, gram-positive blood culture  nucleic acid test for the following targets: Staph aureus, Staph  epidermidis, Staph lugdunensis, other Staph species, Enterococcus  faecalis, Enterococcus faecium, Streptococcus species, S. agalactiae,  S. anginosus grp., S. pneumoniae, S. pyogenes, Listeria sp., mecA  (methicillin resistance) and Randolph/B (vancomycin resistance).      Critical Value/Significant Value called to and read back by FREDO ESPINO 02.06.2019 AT 7.43AM,ZG    No growth Moderate growth  Normal geovanna   Light growth  Escherichia coli  *  Moderate growth  beta hemolytic   Streptococcus constellatus  *  Moderate growth  Normal skin geovanna    Moderate growth  Normal skin geovanna    Moderate growth  beta hemolytic   Streptococcus constellatus  Susceptibility testing done on previous specimen  * 50,000 to 100,000 colonies/mL  Lactose fermenting gram negative rods  *  10,000 to 50,000 colonies/mL  Strain 2  Lactose fermenting gram negative rods  *  <10,000 colonies/mL  Strain 3  Lactose fermenting gram negative rods  *  <10,000 colonies/mL  Strain 4  Lactose fermenting gram negative rods  *  Susceptibility testing not routinely done Cultured on the 1st day of incubation:  Escherichia coli  Susceptibility testing done on previous specimen  *  Critical Value/Significant Value, preliminary result only, called to and read back by  Beronica Woods  RN @0732 01/31/19 gd

## 2019-03-25 NOTE — PROGRESS NOTES
Clinic Care Coordination Contact    Situation: Patient chart reviewed by care coordinator.    Background:   Patient was admitted to [facility] ICU on 03/16/2019 for severe sepsis.   Refer to admission notes for details.     Assessment: IP Care Transitions SW working on TCU placement for patient.   TBD.       Plan/Recommendations:   RNCC will monitor patient's chart and follow-up as appropriate.     ENROLLMENT STATUS:   Potential    CARE COORDINATOR STATUS: Care team current.     Tracy Romero, BSN, RN, PHN   [facility] Care Coordinator - [facility] Locations   Direct:  [phone] (voicemail available)   (Today's Date: 03/25/2019)

## 2019-03-25 NOTE — PLAN OF CARE
Pt continues to be hospitalized for pyelonephritis/septic shock. Temp max 99.5, otherwise VSS. RA. PO oxy given x1 for R leg pain. Bobo in place with adequate output. Tx: antibiotics. PICC saline locked. Up assist two with lift, turning [id number] when in bed. WOC, PT, OT following. Discharge to TCU.

## 2019-03-25 NOTE — PLAN OF CARE
A/Ox4, Ax2 mechanical lift, up to chair for 4 hours this shift, VSS, reports 7-8/10 pain  to R leg, hip and lower back, LS clear on RA, mod cho diet, Bobo patent, incontinent of bowel, compression stockings on, R PICC, PO cipro, PT/OT/ID/WOC/Social work and urology following,  plan to discharge to TCU when bed available, continue with plan of care.

## 2019-03-26 VITALS
BODY MASS INDEX: 42.66 KG/M2 | SYSTOLIC BLOOD PRESSURE: 141 MMHG | HEIGHT: 67 IN | TEMPERATURE: 98.1 F | DIASTOLIC BLOOD PRESSURE: 55 MMHG | WEIGHT: 271.8 LBS | OXYGEN SATURATION: 93 % | RESPIRATION RATE: 20 BRPM | HEART RATE: 92 BPM

## 2019-03-26 LAB — MAGNESIUM SERPL-MCNC: 2.1 MG/DL (ref 1.6–2.3)

## 2019-03-26 PROCEDURE — 25000128 H RX IP 250 OP 636: Performed by: INTERNAL MEDICINE

## 2019-03-26 PROCEDURE — 25000132 ZZH RX MED GY IP 250 OP 250 PS 637: Performed by: INTERNAL MEDICINE

## 2019-03-26 PROCEDURE — G0463 HOSPITAL OUTPT CLINIC VISIT: HCPCS

## 2019-03-26 PROCEDURE — 25800030 ZZH RX IP 258 OP 636: Performed by: INTERNAL MEDICINE

## 2019-03-26 PROCEDURE — 99232 SBSQ HOSP IP/OBS MODERATE 35: CPT | Performed by: INTERNAL MEDICINE

## 2019-03-26 PROCEDURE — 83735 ASSAY OF MAGNESIUM: CPT | Performed by: INTERNAL MEDICINE

## 2019-03-26 RX ADMIN — SENNOSIDES AND DOCUSATE SODIUM 2 TABLET: 8.6; 5 TABLET ORAL at 08:00

## 2019-03-26 RX ADMIN — GABAPENTIN 600 MG: 600 TABLET, FILM COATED ORAL at 15:29

## 2019-03-26 RX ADMIN — DAPTOMYCIN 500 MG: 500 INJECTION, POWDER, LYOPHILIZED, FOR SOLUTION INTRAVENOUS at 13:35

## 2019-03-26 RX ADMIN — OXYCODONE HYDROCHLORIDE 15 MG: 5 TABLET ORAL at 08:00

## 2019-03-26 RX ADMIN — ATORVASTATIN CALCIUM 10 MG: 10 TABLET, FILM COATED ORAL at 08:01

## 2019-03-26 RX ADMIN — POTASSIUM CHLORIDE 20 MEQ: 1500 TABLET, EXTENDED RELEASE ORAL at 07:58

## 2019-03-26 RX ADMIN — OXYCODONE HYDROCHLORIDE 15 MG: 5 TABLET ORAL at 13:35

## 2019-03-26 RX ADMIN — FUROSEMIDE 40 MG: 40 TABLET ORAL at 08:01

## 2019-03-26 RX ADMIN — HEPARIN SODIUM 7500 UNITS: 5000 INJECTION, SOLUTION INTRAVENOUS; SUBCUTANEOUS at 08:01

## 2019-03-26 RX ADMIN — BACLOFEN 10 MG: 10 TABLET ORAL at 15:29

## 2019-03-26 RX ADMIN — BACLOFEN 10 MG: 10 TABLET ORAL at 07:59

## 2019-03-26 RX ADMIN — GABAPENTIN 600 MG: 600 TABLET, FILM COATED ORAL at 08:01

## 2019-03-26 RX ADMIN — METOPROLOL TARTRATE 25 MG: 25 TABLET ORAL at 08:00

## 2019-03-26 RX ADMIN — OXYCODONE HYDROCHLORIDE 15 MG: 5 TABLET ORAL at 03:46

## 2019-03-26 RX ADMIN — LOSARTAN POTASSIUM 50 MG: 50 TABLET, FILM COATED ORAL at 08:01

## 2019-03-26 RX ADMIN — VITAMIN D, TAB 1000IU (100/BT) 1000 UNITS: 25 TAB at 08:01

## 2019-03-26 RX ADMIN — POLYETHYLENE GLYCOL 3350 17 G: 17 POWDER, FOR SOLUTION ORAL at 08:02

## 2019-03-26 RX ADMIN — CIPROFLOXACIN HYDROCHLORIDE 500 MG: 500 TABLET, FILM COATED ORAL at 07:59

## 2019-03-26 ASSESSMENT — ACTIVITIES OF DAILY LIVING (ADL)
ADLS_ACUITY_SCORE: 23

## 2019-03-26 NOTE — PROGRESS NOTES
"[facility]  Infectious Disease Progress Note          Assessment and Plan:     ASSESSMENT:  1. 54 yo female acute SEPTIC SHOCK-resolved,  BC negative, UC  With  P.aeruginosa,  Onset after  L ureteroscopy,  Doing well,  JULIANNE  Resolved, off pressors  2. UTI,  UROSEPSIS-Pseudomonas aeruginosa,   SUSY pansens  3. JULIANNE-Resolved  4.  OBESITY  5.  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 H/O T CELL  LYMPHOMA     REC  1.  Continue cipro , po OK  at 500 bid , plan 2 weeks total ABX, day 10/14 today  2 Would normally ignore VRE UC but with uriary manipulation give a bit of dapto(linezolid drug interactions so avoid),continue while here , but stop at disposition          Interval History:   no new complaints feels better out of ICU; no cp, sob, n/v/d, or abd pain. Chronic R leg pain, no other pain site  cxs as above  cx also low ct VRE nares MRSA still here disposition issue              Medications:       amitriptyline  100 mg Oral At Bedtime     atorvastatin  10 mg Oral Daily     baclofen  10 mg Oral TID     ciprofloxacin  500 mg Oral Q12H JOSE     DAPTOmycin (CUBICIN) intermittent infusion  4 mg/kg Intravenous Q24H     furosemide  40 mg Oral Daily     gabapentin  600 mg Oral TID     heparin  7,500 Units Subcutaneous Q12H     losartan  50 mg Oral Daily     metoprolol tartrate  25 mg Oral BID     polyethylene glycol  17 g Oral Daily     potassium chloride ER  20 mEq Oral Daily     senna-docusate  3 tablet Oral BID     sodium chloride (PF)  10 mL Intracatheter Q7 Days     vitamin D3  1,000 Units Oral Daily                  Physical Exam:   Blood pressure 121/52, pulse 92, temperature 97.9  F (36.6  C), temperature source Oral, resp. rate 18, height 1.702 m (5' 7\"), weight 123.3 kg (271 lb 12.8 oz), last menstrual period 12/11/2011, SpO2 93 %, not currently breastfeeding.  Wt Readings from Last 2 Encounters:   03/19/19 123.3 kg (271 lb 12.8 oz)   03/15/19 112.9 kg (249 lb)     Vital Signs with " Ranges  Temp:  [97.5  F (36.4  C)-98.6  F (37  C)] 97.9  F (36.6  C)  Heart Rate:  [80-91] 91  Resp:  [18] 18  BP: (121-142)/(47-62) 121/52  SpO2:  [92 %-96 %] 93 %    Constitutional: Awake, alert, cooperative, no apparent distress cognition Ok   Lungs: Clear to auscultation bilaterally, no crackles or wheezing   Cardiovascular: Regular rate and rhythm, normal S1 and S2, and no murmur noted   Abdomen: Normal bowel sounds, soft, non-distended, non-tender   Skin: No rashes, no cyanosis, no edema   Other:           Data:   All microbiology laboratory data reviewed.  Recent Labs   Lab Test 03/25/19  0753 03/22/19  0615 03/21/19  0540 03/20/19  0545   WBC  --  17.0* 14.0* 11.9*   HGB  --  9.4* 9.4* 9.0*   HCT  --  30.4* 30.4* 29.3*   MCV  --  85 85 86    266 267 248     Recent Labs   Lab Test 03/22/19  0615 03/21/19  0540 03/20/19  0545   CR 0.63 0.65 0.64     Recent Labs   Lab Test 11/01/17  1623   SED 39*     Recent Labs   Lab Test 03/16/19  1550 03/16/19  1455 03/16/19  1222 03/16/19  1200 02/04/19  2130 01/31/19  0410 01/30/19  2120 01/30/19  2016 01/30/19  1522   CULT No growth Methicillin resistant Staphylococcus aureus (MRSA) isolated  This isolate DOES NOT demonstrate inducible clindamycin resistance in vitro. Clindamycin   is susceptible and could be used when indicated, however, erythromycin is resistant and   should not be used.  *  Olive Donald RN 5th floor notified of MRSA 3/20/19 0800 dg 50,000 to 100,000 colonies/mL  Pseudomonas aeruginosa  *  <10,000 colonies/mL  Enterococcus faecium (VRE)  *  Critical Value/Significant Value, preliminary result only, called to and read back by  Merline Bryan RN at [facility] MS5 at 2:30pm 3/18/2019 ()   No growth Cultured on the 2nd day of incubation:  Staphylococcus epidermidis  *  Critical Value/Significant Value, preliminary result only, called to and read back by  Claudia Marsh, FREDO 2915 2/6/19. MS    (Note)  POSITIVE for STAPHYLOCOCCUS EPIDERMIDIS  and POSITIVE for the mecA  gene (resistant to methicillin) by Verigene multiplex nucleic acid  test. Final identification and antimicrobial susceptibility testing  will be verified by standard methods.      Specimen tested with Verigene multiplex, gram-positive blood culture  nucleic acid test for the following targets: Staph aureus, Staph  epidermidis, Staph lugdunensis, other Staph species, Enterococcus  faecalis, Enterococcus faecium, Streptococcus species, S. agalactiae,  S. anginosus grp., S. pneumoniae, S. pyogenes, Listeria sp., mecA  (methicillin resistance) and Randolph/B (vancomycin resistance).      Critical Value/Significant Value called to and read back by FREDO ESPINO 02.06.2019 AT 7.43AM,ZG    No growth Moderate growth  Normal geovanna   Light growth  Escherichia coli  *  Moderate growth  beta hemolytic   Streptococcus constellatus  *  Moderate growth  Normal skin geovanna    Moderate growth  Normal skin geovanna    Moderate growth  beta hemolytic   Streptococcus constellatus  Susceptibility testing done on previous specimen  * 50,000 to 100,000 colonies/mL  Lactose fermenting gram negative rods  *  10,000 to 50,000 colonies/mL  Strain 2  Lactose fermenting gram negative rods  *  <10,000 colonies/mL  Strain 3  Lactose fermenting gram negative rods  *  <10,000 colonies/mL  Strain 4  Lactose fermenting gram negative rods  *  Susceptibility testing not routinely done Cultured on the 1st day of incubation:  Escherichia coli  Susceptibility testing done on previous specimen  *  Critical Value/Significant Value, preliminary result only, called to and read back by  Beronica Woods  RN @0732 01/31/19 gd

## 2019-03-26 NOTE — PROGRESS NOTES
Discharge Planner   Discharge Plans in progress: PT finally has ins auth for TCU for today Will page MD for orders. Pt will need transport   Barriers to discharge plan: alerted facility that pt need to get to her pain clinic for refill   Follow up plan:      03/26/19 0900   [facility]   Skilled Nursing [facility] [phone], Fax: [phone]   PAS Number [id number]   Previous PAS still good       Entered by: Corinne C. White 03/26/2019 9:35 AM       CM: Called spouse. He upset that pt was not able to get into [facility]. He is upset that pt was readmitted after having a out pt procedure here and now he has to pay for transport to the TCU.. He is upset that we were not able to fill pt pump and that she will need to transport to [facility] for this.  He is upset that we would not have sent pt from here to there. SW attempted to apologize about all above issues.     Transport set for 1530

## 2019-03-26 NOTE — PLAN OF CARE
[id number]:  VS: Stable. Afebrile. Remains on RA. Pain moderately well controlled with PRN oxycodone.   GI: BS active, passing flatus. Multiple soft stools today, some fecal incontinence at times. Good appetite.   LS: Clear, denies shortness of breath.   : Bobo with adequate urine output.   Neuro: Alert and oriented x4. Uses call light appropriately and can make his needs known.   Mobility: Up with lift with frequent repositioning, particularly side to side when in bed.   Skin: Wound noted to left buttock, dressing changed and intact. Groin/periarea raw but improving per pt.   Disposition: TCU when bed available.

## 2019-03-26 NOTE — PROGRESS NOTES
[facility]  WOC Nurse Inpatient Adult Pressure Injury (PI) Wound Assessment     Assessment of PI(s) on pt's:   Bilateral buttocks    Data:   Patient History:      per MD note(s):  53 year old  female with a significant past medical history of complex medical problems with frequent hospitalization discharged last month on February 15, 2019 after 2 weeks stay in the hospital for septic shock, respiratory failure who currently presents from nursing home with decreased level of consciousness.  Patient had a urologic procedure yesterday on March 15, 2019.  She had cystoscopy, left ureteroscopy with holmium laser lithotripsy and left ureteral stent exchange by Dr. Mayito Chauhan under general anesthesia at [facility] outpatient surgery.  Patient remained lethargic since the procedure was done and had a hard time getting up to a wheelchair to eat.  Emergency medical team were called and patient was found to have hypoxia with oxygen saturation 72% room air and patient was placed on oxygen and was taken to the emergency department for evaluation.  The emergency department patient was stabilized and required emergency intubation and further evaluations done.  On arrival to the emergency department patient's blood pressure was 81/52, temperature was 101.3 and mental status was depressed.  Further studies showed evidence of elevated creatinine at 1.64 from her normal baseline, slightly elevated troponin at 0.050, significant leukocytosis with WBC count of 38.5 with left shift.  Patient was then admitted to our service for further care.   ICU Bedside staff documented non blanchable skin to buttocks on admission. Pt on MS 5 and  WOC following  wounds to buttocks as they are evolving.     Mattress:  Standard , Pulsate  Current pressure relieving devices:  Mepilex dressing and Pillows    Moisture Management:  Incontinence Protocol, Diaper and Urinary Catheter    Catheter secured?  Yes    Current Diet / Nutrition:     Orders Placed This Encounter      Combination Diet 4813-5404 Calories: Moderate Consistent CHO (4-6 CHO units/meal)      Advance Diet as Tolerated    Issac Risk Assessment Isasc Risk Assessment    Sensory Perception: 3-->slightly limited    Moisture: 3-->occasionally moist   Activity: 2-->chairfast     Mobility: 3-->slightly limited   Nutrition: 3-->adequate   Friction and Shear: 2-->potential problem  Issac Score: 16     .Labs:   Recent Labs   Lab Test 03/22/19  0615  03/17/19  0615  02/04/19  0545  10/03/13  1614   ALBUMIN  --   --  2.1*   < >  --    < > 3.6*   HGB 9.4*   < > 9.4*   < > 10.3*   < > 12.7   INR  --   --   --   --   --   --  0.93   WBC 17.0*   < > 31.1*   < > 21.6*   < > 14.4*   A1C  --   --  6.3*   < >  --    < >  --    CRP  --   --   --   --  57.7*   < >  --     < > = values in this interval not displayed.                                                                                                                         Pressure Injury Assessment  (location #1):   Bilateral outer Buttocks    Wound History:   See above, present on admission per ICU notes    Specific Dimensions (length x width x depth, in cm) :       Left Buttocks: 7 x 3cm 80% dry maroon vs dried blood and 20 % non blanchable pink    Right Buttocks: 0.5x1cm intact dry non blanchable maroon superficial scab    Periwound Skin: intact pink skin and all blanchable, superficial erosion around romeo anal area due to frequent watery stools     Drainage:    Amount: very scant dry blood    Odor: none    Pain:  No complaints         Intervention:     Patient's chart evaluated.      Issac Interventions:  Current Issac Interventions and Care Plan reviewed and updated, appropriate at this time.    Wound was assessed.    Wound Care: was done: Removal of existing dressing; Visual inspection; Re-Application of  New Mepilex 4x4 dressing with No sting     Orders  reviewed    Supplies  Per POC- in  "room    Discussed plan of care with Patient and Nurse           Assessment:     Pressure Injury located on bilateral Buttocks: Deep tissue injury and present on admission    Status: wounds continue evolving; continue Mepilex dressing; air bed; repositioning and avoiding prolonged sitting in the chair.     Discussion with pt regarding groin skin fold wounds, she states they are healed and staff are placing dry cloths in folds         Plan:     Nursing to notify the Provider(s) and re-consult the [facility] Nurse if wound(s) deteriorate(s)or if the wound care plan needs reevaluation.    Plan for wound care to wound located on buttocks: Every 3 days    1. Dab clean with wound spray and pat dry    2. Cover open wound with adaptic cut to size    3. Cover all area with Mepilex dressings- use Sacral or 4x4 to cover the area, date    4. Use No sting barrier to help adhere.   5. Pressure Injury Prevention (PIP) MEASURES:  1. If pt is refusing to turn or reposition they must be educated on the potential of injury due to not off loading.  Then this \"educated refusal\" needs to be documented as an \"educated refusal to turn/ reposition\" and document if alert, etc. Additionally, you MUST notify the charge nurse, nurse manager and the provider of the pt's refusal to reposition.  2. Follow Issac Risk recommendations  Nutrition following  Moisture and Incontinence issues in place  3. CHAIR POSITIONING:  REPOSITIONING   Fully off load every 1-2 hours (stand x 5 minutes or back to bed)   Shift side to side every 15 minutes- eBuddy  Chair cushion pt has and will have family bring from home ([id number]) when up to chair (pillow does not actually off load)  4.  BED:    POSTIONING  No direct supine positioning, position only side to side  Try to keep HOB below 30 degrees  Reposition every 1-2 hours  Keep heels elevated- one pillow under each leg from knee to heels, checking heels are free  Pulsate specialty mattress- in place        [facility] " Nurse will return: weekly

## 2019-03-26 NOTE — PLAN OF CARE
Pt admitted for septic shock secondary to pyelonephritis. Pt discharging in stable condition to TCU with [facility]. Pt up with lift, not baseline. Alert and oriented x4. Pain well controlled with oxycodone. Repositioning side to side and in chair at minimum every 2 hours. Pressure injury to bottom remains unchanged from yesterday. Pt educated on continued importance of repositioning after discharge. Bobo discontinued today and voiding adequately. BM today, soft/formed. Plan for cysto with stent removal tomorrow with Dr. Drake. All belongings sent with  [facility] at time of discharge. PICC removed without issue prior to discharge. AVS provided to patient and reviewed. All questions answered.

## 2019-03-26 NOTE — PLAN OF CARE
Patient was discharged to [facility] TCU this afternoon and is scheduled to have ureteral stent removed in surgery tomorrow 03/27/19 under local. Montserrat from [facility] has set up transportation with arrival time of 1100. Spoke with Jenn (Amanda's admitting nurse at [facility]) regarding preparation for procedure tomorrow.

## 2019-03-26 NOTE — PLAN OF CARE
Physical Therapy Discharge Summary     Reason for therapy discharge:    Discharged to transitional care facility.     Progress towards therapy goal(s). See goals on Care Plan in [facility] electronic health record for goal details.  Goals not met.  Barriers to achieving goals:   limited tolerance for therapy.     Therapy recommendation(s):    Continued therapy is recommended.  Rationale/Recommendations:  Pt is below baseline with functional mobility and strength and would benefit from continued PT to progress skills.

## 2019-03-26 NOTE — PLAN OF CARE
Orientation: Alert and oriented x4  VSS. 92% on RA.   Tele: N/A. HR 86.   LS: Diminished, denies SOB/NOGUERA  GI: BS audible/active x4, tolerating PO. Passing gas. No BM this shift. Denies N/V.   : Adequate urine output. Yes, ramos catheter in place, patent, good urinary output  Skin: blanchable redness bilateral legs and groin. Bilateral open areas in groin. Nonblanchable redness buttocks   Activity: Total assist/lift, hx MS. Pt slept comfortably throughout shift.   Pain: 8/10, PRN oxycodone given  Plan: Pain control, supportive cares, PO/IV abx, discharge TBD. Continue with current cares.

## 2019-03-26 NOTE — DISCHARGE SUMMARY
[facility]    Discharge Summary  Hospitalist    Date of Admission:  3/16/2019  Date of Discharge:  3/26/2019  Discharging Provider: Kenyon Crowley MD  Date of Service (when I saw the patient): 03/26/19    Discharge Diagnoses   CHIEF COMPLAINTS:      1.  Septic shock secondary to infected left ureteral stone.  Urine culture growing pseudomonas this admission.  Urine culture also growing out VRE.  The patient was treated with ciprofloxacin and daptomycin this admission.  Plan is for a followup with Dr. Chauhan of Urology for removal of ureteral stent on 03/29/2019.   2.  Acute respiratory failure secondary to severe sepsis requiring intubation and mechanical ventilation, resolved.   3.  Septic encephalopathy this admission.   4.  Acute renal failure secondary to sepsis.      PAST MEDICAL HISTORY:   1.  Recent 2-week hospitalization for septic shock and respiratory failure with discharge on 02/15/2019.  The patient subsequently underwent outpatient urologic procedure on 03/15/2019 with lithotripsy and left ureteral stent exchange for left ureteral stone.  Shortly after that procedure, the patient became lethargic and hypoxic and was brought to the ER for evaluation.  She was subsequently evaluated, which led to her current admission.   2.  Hypertension history.   3.  History of multiple sclerosis.  She has a chronic baclofen pump.   4.  Chronic pain syndrome.   5.  Type 2 diabetes mellitus, on metformin.   6.  Hyperlipidemia.      PRINCIPAL PROCEDURES THIS ADMISSION:   1.  Infection Disease consultation.   2.  Urology consultation.   3.  IV antibiotics.   4.  Urine culture growing out ,000 colonies of pseudomonas as well as less than 10,000 colonies of Enterococcus.  Enterococcus was sensitive to linezolid, but resistant to vancomycin and penicillin.  Pseudomonas was pansensitive.     History of Present Illness   Ms. Richardson is a 53-year-old female with the above medical history who was recently  hospitalized after a 2-week hospital stay for septic shock and respiratory failure.  She was discharged 02/15/2019.  It was felt that the septic shock was due to a urinary tract infection.  During that admission to the hospital, Urology was consulted and underwent ureteral stent placement.  Blood and urine cultures were positive for E. coli that admission.      The patient went to see Dr. Chauhan of Urology on 03/15/2019 for lithotripsy and left ureteral stent exchange.  After the procedure, the patient was somewhat lethargic and the following day, she was found to be hypoxic at her transitional care unit.  She was subsequently brought back to the ER for evaluation and felt to have septic shock.  She was admitted to the Intensive Care Unit and intubated for respiratory failure and sepsis.      Hospital Course   Septic shock:  Felt secondary to infected ureteral stone.  Symptoms started after lithotripsy and ureteral stent exchange.  Urine culture grew out Pseudomonas as well as enterococcus.  The patient was treated with antibiotics while hospitalized with subsequent improvement.  She was successfully extubated and transitioned from the ICU to the medical floor.  By day of discharge, sepsis had resolved, she was afebrile, and weaned off of oxygen.  Plans are for discharge to a transitional care unit.  She will see Dr. Chauhan on 03/29/2019 for ureteral stent removal.  Infectious Disease is recommending 5 more days of oral antibiotics to complete her therapy for sepsis.  She will be discharged on oral ciprofloxacin to cover for Pseudomonas.  She has also history of daptomycin while hospitalized for a small amount of enterococcus found in urine, but I doubt that this is the pathogen that led to her sepsis and Infectious Disease felt comfortable stopping her daptomycin at time of discharge from the hospital.    I personally evaluated and examined the patient on the day of discharge.    Kenyon Crowley MD      Pending  Results   These results will be followed up by PCP  Unresulted Labs Ordered in the Past 30 Days of this Admission     No orders found from 1/15/2019 to 3/17/2019.               Primary Care Physician   Julius Hassan        Discharge Disposition   Discharged to TCU  Condition at discharge: Stable    Consultations This Hospital Stay   PHARMACY TO DOSE VANCO  VASCULAR ACCESS ADULT IP CONSULT  UROLOGY IP CONSULT  INTENSIVIST IP CONSULT  INFECTIOUS DISEASES IP CONSULT  SOCIAL WORK IP CONSULT  PHYSICAL THERAPY ADULT IP CONSULT  OCCUPATIONAL THERAPY ADULT IP CONSULT  WOUND OSTOMY CONTINENCE NURSE  IP CONSULT  PHYSICAL THERAPY ADULT IP CONSULT  OCCUPATIONAL THERAPY ADULT IP CONSULT    Time Spent on this Encounter   Discharge time: greater than 30 minutes.    Discharge Orders      General info for SNF    Length of Stay Estimate: Short Term Care: Estimated # of Days <30  Condition at Discharge: Improving  Level of care:skilled   Rehabilitation Potential: Good  Admission H&P remains valid and up-to-date: Yes  Recent Chemotherapy: N/A  Use Nursing Home Standing Orders: Yes     Mantoux instructions    Give two-step Mantoux (PPD) Per Facility Policy Yes     Glucose monitor nursing POCT    Before meals and at bedtime     Activity - Up ad fabrice     Follow Up and recommended labs and tests    Follow up for ureteral stent removal with Dr. Chauhan of urology on 3/29/19.    You will need to have your intrathecal baclofen pump refilled this week, prior to 3/29/19.  This is managed by [facility] [place].  Call [phone] to arrange refill.     Reason for your hospital stay    Sepsis. UTI.     Full Code     Physical Therapy Adult Consult    Evaluate and treat as clinically indicated.    Reason:  weakness     Occupational Therapy Adult Consult    Evaluate and treat as clinically indicated.    Reason:  weakness     Oxygen - Nasal cannula    2 Lpm by nasal cannula as needed to keep O2 sats 90% or greater.     Advance Diet as Tolerated     Follow this diet upon discharge: diabetic diet     Discharge Medications   Current Discharge Medication List      START taking these medications    Details   ciprofloxacin (CIPRO) 500 MG tablet Take 1 tablet (500 mg) by mouth every 12 hours for 5 days    Associated Diagnoses: Sepsis, due to unspecified organism (H)         CONTINUE these medications which have CHANGED    Details   bisacodyl (DULCOLAX) 10 MG suppository Place 1 suppository (10 mg) rectally daily as needed for constipation    Associated Diagnoses: MS (multiple sclerosis) (H)      oxyCODONE IR (ROXICODONE) 15 MG tablet Take 0.5-1 tablets (7.5-15 mg) by mouth every 4 hours as needed for moderate to severe pain Limit 4 tablet(s) per day.  Qty: 20 tablet, Refills: 0    Associated Diagnoses: MS (multiple sclerosis) (H); Chronic pain syndrome; Spinal stenosis of lumbar region, unspecified whether neurogenic claudication present; Lumbar radiculopathy      senna-docusate (SENOKOT-S/PERICOLACE) 8.6-50 MG tablet Take 3 tablets by mouth 2 times daily  Qty: 180 tablet, Refills: 0    Associated Diagnoses: MS (multiple sclerosis) (H)         CONTINUE these medications which have NOT CHANGED    Details   amitriptyline (ELAVIL) 100 MG tablet Take 1 tablet (100 mg) by mouth At Bedtime  Qty: 90 tablet, Refills: 3    Associated Diagnoses: Lumbar radiculopathy; Chronic pain syndrome; MS (multiple sclerosis) (H); Moderate depressive episode (H)      atorvastatin (LIPITOR) 10 MG tablet Take 1 tablet (10 mg) by mouth daily  Qty: 90 tablet, Refills: 3    Associated Diagnoses: Hyperlipidemia LDL goal <70      !! baclofen (LIORESAL) 10 MG tablet Take 10 mg by mouth 3 times daily      baclofen (LIORESAL) intraTHECAL Internal Pump by Intrathecal route continuous prn 3/18/19 - Managed by [facility] [place], spoke to Jolene at [phone]  Pump contains baclofen 2000mcg/ml  Alarm date: 3/29/19  Dose-413mcg/day  Reservoir contains 2ml after the alarm date.       cholecalciferol 1000 units TABS Take 1,000 Units by mouth daily      furosemide (LASIX) 40 MG tablet Take 1 tablet (40 mg) by mouth daily  Qty: 30 tablet, Refills: 0    Associated Diagnoses: Acute hypercapnic respiratory failure (H)      gabapentin (NEURONTIN) 600 MG tablet Take 1 tablet (600 mg) by mouth 3 times daily  Qty: 30 tablet, Refills: 0    Associated Diagnoses: MS (multiple sclerosis) (H)      hydrochlorothiazide (MICROZIDE) 12.5 MG capsule 1 capsule (12.5 mg) by Oral or Feeding Tube route daily  Qty: 30 capsule, Refills: 0    Associated Diagnoses: Hypertension goal BP (blood pressure) < 140/90      losartan (COZAAR) 50 MG tablet Take 1 tablet (50 mg) by mouth daily  Qty: 30 tablet, Refills: 0    Associated Diagnoses: Hypertension goal BP (blood pressure) < 140/90      metFORMIN (GLUCOPHAGE) 500 MG tablet Take 1 tablet (500 mg) by mouth daily (with dinner)  Qty: 90 tablet, Refills: 3    Associated Diagnoses: Type 2 diabetes mellitus with stage 1 chronic kidney disease, without long-term current use of insulin (H)      metoprolol succinate (TOPROL-XL) 50 MG 24 hr tablet TAKE 1 TABLET BY MOUTH ONCE DAILY  Qty: 90 tablet, Refills: 1    Associated Diagnoses: Hypertension goal BP (blood pressure) < 140/90      Multiple Vitamin (MULTI-VITAMIN PO) Take 1 tablet by mouth daily       omega-3 fatty acids (FISH OIL) 1200 MG capsule Take 1 capsule by mouth daily.      !! polyethylene glycol (MIRALAX/GLYCOLAX) packet Take 17 g by mouth daily      POTASSIUM CHLORIDE ER PO Take 20 mEq by mouth daily      !! baclofen (LIORESAL) 10 MG tablet Take 10 mg by mouth daily as needed for muscle spasms      docusate sodium (COLACE) 100 MG capsule Take 100 mg by mouth 2 times daily as needed for constipation      !! polyethylene glycol (MIRALAX/GLYCOLAX) packet Take 1 packet by mouth 2 times daily as needed for constipation       !! - Potential duplicate medications found. Please discuss with provider.      STOP taking these  medications       ASPIRIN PO Comments:   Reason for Stopping:         cephALEXin (KEFLEX) 500 MG capsule Comments:   Reason for Stopping:             Allergies   Allergies   Allergen Reactions     Lisinopril      Lip swelling     Cyclobenzaprine Other (See Comments)     Per 4-10-17 H&P by Yanna Dugan PA-C.     Flexeril [Cyclobenzaprine Hcl]      Got confused      Data   Most Recent 3 CBC's:  Recent Labs   Lab Test 03/25/19  0753 03/22/19  0615 03/21/19  0540 03/20/19  0545   WBC  --  17.0* 14.0* 11.9*   HGB  --  9.4* 9.4* 9.0*   MCV  --  85 85 86    266 267 248      Most Recent 3 BMP's:  Recent Labs   Lab Test 03/22/19  0615 03/21/19  0540 03/20/19  0545    137 139   POTASSIUM 3.4 3.5 3.5   CHLORIDE 101 98 100   CO2 32 34* 37*   BUN 15 13 10   CR 0.63 0.65 0.64   ANIONGAP 4 5 2*   MARY 8.1* 8.5 8.4*   * 113* 98     Most Recent 2 LFT's:  Recent Labs   Lab Test 03/17/19  0615 03/16/19  1200   AST 36 31   ALT 29 28   ALKPHOS 170* 171*   BILITOTAL 0.9 0.7     Most Recent INR's and Anticoagulation Dosing History:  Anticoagulation Dose History     Recent Dosing and Labs Latest Ref Rng & Units 10/3/2013    INR 0.86 - 1.14 0.93        Most Recent 3 Troponin's:  Recent Labs   Lab Test 03/21/19  0638 03/16/19  1725 03/16/19  1200   TROPI <0.015 0.061* 0.050*     Most Recent Cholesterol Panel:  Recent Labs   Lab Test 03/18/19  0625  06/20/18  1529   CHOL  --   --  141   LDL  --   --  82   HDL  --   --  33*   TRIG 218*   < > 130    < > = values in this interval not displayed.     Most Recent 6 Bacteria Isolates From Any Culture (See EPIC Reports for Culture Details):  Recent Labs   Lab Test 03/16/19  1550 03/16/19  1455 03/16/19  1222 03/16/19  1200 02/04/19  2130 01/31/19  0410   CULT No growth Methicillin resistant Staphylococcus aureus (MRSA) isolated  This isolate DOES NOT demonstrate inducible clindamycin resistance in vitro. Clindamycin   is susceptible and could be used when indicated, however,  erythromycin is resistant and   should not be used.  *  Olive Donald RN 5th floor notified of MRSA 3/20/19 0800 dg 50,000 to 100,000 colonies/mL  Pseudomonas aeruginosa  *  <10,000 colonies/mL  Enterococcus faecium (VRE)  *  Critical Value/Significant Value, preliminary result only, called to and read back by  Merline Bryan RN at [facility] MS5 at 2:30pm 3/18/2019 ()   No growth Cultured on the 2nd day of incubation:  Staphylococcus epidermidis  *  Critical Value/Significant Value, preliminary result only, called to and read back by  Claudia Mrash, FREDO 0458 2/6/19. MS    (Note)  POSITIVE for STAPHYLOCOCCUS EPIDERMIDIS and POSITIVE for the mecA  gene (resistant to methicillin) by Solid Sound multiplex nucleic acid  test. Final identification and antimicrobial susceptibility testing  will be verified by standard methods.      Specimen tested with Thrinaciaigene multiplex, gram-positive blood culture  nucleic acid test for the following targets: Staph aureus, Staph  epidermidis, Staph lugdunensis, other Staph species, Enterococcus  faecalis, Enterococcus faecium, Streptococcus species, S. agalactiae,  S. anginosus grp., S. pneumoniae, S. pyogenes, Listeria sp., mecA  (methicillin resistance) and Randolph/B (vancomycin resistance).      Critical Value/Significant Value called to and read back by FREDO ESPINO 02.06.2019 AT 7.43AM,ZG    No growth Moderate growth  Normal geovanna       Most Recent TSH, T4 and A1c Labs:  Recent Labs   Lab Test 03/17/19  0615  01/30/19  1258  12/08/14  1756   TSH  --   --  5.08*   < > 4.11*   T4  --   --   --   --  1.19   A1C 6.3*   < > 6.4*   < >  --     < > = values in this interval not displayed.

## 2019-03-27 ENCOUNTER — PATIENT OUTREACH (OUTPATIENT)
Dept: CARE COORDINATION | Facility: CLINIC | Age: 54
End: 2019-03-27

## 2019-03-27 ENCOUNTER — HOSPITAL ENCOUNTER (OUTPATIENT)
Facility: CLINIC | Age: 54
Discharge: NURSING, REHAB OR LONG-TERM CARE | End: 2019-03-27
Attending: UROLOGY | Admitting: UROLOGY
Payer: COMMERCIAL

## 2019-03-27 VITALS
BODY MASS INDEX: 37.89 KG/M2 | RESPIRATION RATE: 18 BRPM | TEMPERATURE: 98.4 F | SYSTOLIC BLOOD PRESSURE: 126 MMHG | HEIGHT: 68 IN | OXYGEN SATURATION: 98 % | WEIGHT: 250 LBS | DIASTOLIC BLOOD PRESSURE: 88 MMHG

## 2019-03-27 LAB — GLUCOSE BLDC GLUCOMTR-MCNC: 129 MG/DL (ref 70–99)

## 2019-03-27 PROCEDURE — 52310 CYSTOSCOPY AND TREATMENT: CPT | Mod: 58 | Performed by: UROLOGY

## 2019-03-27 PROCEDURE — 71000027 ZZH RECOVERY PHASE 2 EACH 15 MINS: Performed by: UROLOGY

## 2019-03-27 PROCEDURE — 82962 GLUCOSE BLOOD TEST: CPT

## 2019-03-27 PROCEDURE — 36000050 ZZH SURGERY LEVEL 2 1ST 30 MIN: Performed by: UROLOGY

## 2019-03-27 PROCEDURE — 25000125 ZZHC RX 250: Performed by: UROLOGY

## 2019-03-27 PROCEDURE — 40000306 ZZH STATISTIC PRE PROC ASSESS II: Performed by: UROLOGY

## 2019-03-27 RX ORDER — HYDROCHLOROTHIAZIDE 12.5 MG/1
12.5 CAPSULE ORAL DAILY
COMMUNITY
End: 2019-06-10

## 2019-03-27 RX ORDER — GABAPENTIN 300 MG/1
600 CAPSULE ORAL 3 TIMES DAILY
COMMUNITY
End: 2020-08-21

## 2019-03-27 RX ORDER — FUROSEMIDE 40 MG
40 TABLET ORAL DAILY
COMMUNITY
End: 2019-06-10

## 2019-03-27 ASSESSMENT — MIFFLIN-ST. JEOR
SCORE: 1777.49
SCORE: 1757.09

## 2019-03-27 NOTE — OR NURSING
Report called to Luh nurse at [facility] in [place]-  Also instructions reviewed with pt(straight local anesthesia)  Pt alert, oriented x 4, up and dressed in wheelchair, some pain in right hip but ok with it.Ate a bag of pablo grahams and had some oral water. States understands all instructions and states ready for discontinue.  [facility] transportation called and arrived to transfer pt back to [facility].

## 2019-03-27 NOTE — PLAN OF CARE
Occupational Therapy Discharge Summary    Reason for therapy discharge:    Discharged to transitional care facility.    Progress towards therapy goal(s). See goals on Care Plan in [facility] electronic health record for goal details.  Goals not met.  Barriers to achieving goals:   discharge from facility.    Therapy recommendation(s):    Continued therapy is recommended.  Rationale/Recommendations:  TCU recommended by previous therapist.

## 2019-03-27 NOTE — PROGRESS NOTES
"Clinic Care Coordination Contact  Care Coordination Transition Communication    Clinical Data:     Patient was hospitalized at [facility] from 03/16/2019 to 03/26/2019 with diagnosis of septic shock secondary to infected left ureteral stone, acute respiratory failure secondary to severe sepsis requiring intubation and mechanical ventilation, septic encephalopathy (during admission) and acute renal failure secondary to sepsis.    She underwent a flexible cystoscopy with LEFT ureteral stent removal TODAY (03/27/19) with Dr. Mayito Chauhan - Urology and then sent to TCU.     Transition to Facility:              Facility Name: [facility]     Plan:     RN/SW Care Coordinator will await notification from facility staff informing RN/SW Care Coordinator of patient's discharge plans/needs. RN/SW Care Coordinator will review chart and outreach to facility staff every 4 weeks and as needed.     Care Coordinator will do no further outreaches at this time.    Please note, writer will no longer be the RNCC for this office effective 04/17/2019.   \"Warm\" hand-off to SWCC - please monitor for discharge from TCU and follow-up as appropriate.     ENROLLMENT STATUS: Not a Candidate (with RNCC)     CARE COORDINATOR STATUS: Care team updated    Tracy Romero, BSN, RN, PHN   [facility]  Clinic Care Coordinator - [facility] Locations   Direct:  [phone] (voicemail available)   (Today's Date: 03/27/2019)   "

## 2019-03-27 NOTE — DISCHARGE INSTRUCTIONS
DR. JONATHAN FULLER M.D.  CLINIC PHONE NUMBER:  [phone]  CYSTOSCOPY DISCHARGE INSTRUCTIONS    YOU MAY GO BACK TO YOUR NORMAL DIET AND ACTIVITY, UNLESS YOUR DOCTOR TELLS YOU NOT TO.    FOR THE NEXT TWO DAYS, YOU MAY NOTICE:    SOME BLOOD IN YOUR URINE.  SOME BURNING WHEN YOU URINATE (USE THE TOILET).  AN URGE TO URINATE MORE OFTEN.  BLADDER SPASMS.    THESE ARE NORMAL AFTER THE PROCEDURE.  THEY SHOULD GO AWAY AFTER A DAY OR TWO.  TO RELIEVE THESE PROBLEMS:     DRINK 6 TO 8 LARGE GLASSES OF WATER EACH DAY (INCLUDES DRINKS AT MEALS).  THIS WILL HELP CLEAR THE URINE.    TAKE WARM BATHS TO RELIEVE PAIN AND BLADDER SPASMS.  DO NOT ADD ANYTHING TO THE BATH WATER.    YOUR DOCTOR MAY PRESCRIBE PAIN MEDICINE.  YOU MAY ALSO TAKE TYLENOL (ACETAMINOPHEN) FOR PAIN.    CALL YOUR SURGEON IF YOU HAVE:    A FEVER OVER 100 DEGREES FOR MORE THAN A DAY.  CHECK YOUR TEMPERATURE UNDER YOUR TONGUE.    CHILLS.    FAILURE TO URINATE (NO URINE COMES OUT WHEN YOU TRY TO USE THE TOILET).  TRY SOAKING IN A BATHTUB FULL OF WARM WATER.  IF STILL NO URINE, CALL YOUR DOCTOR.    A LOT OF BLOOD IN THE URINE, OR BLOOD CLOTS LARGER THAN A NICKEL.      PAIN IN THE BACK OR BELLY AREA (ABDOMEN).    PAIN OR SPASMS THAT ARE NOT RELIEVED BY WARM TUB BATHS AND PAIN MEDICINE.      SEVERE PAIN, BURNING OR OTHER PROBLEMS WHILE PASSING URINE.    PAIN THAT GETS WORSE AFTER TWO DAYS.

## 2019-03-27 NOTE — OP NOTE
Procedure Date: 2019      SURGEON:  Mayito Chauhan MD      PREOPERATIVE DIAGNOSIS:  Left renal stone.      POSTOPERATIVE DIAGNOSIS:  Left renal stone.      PROCEDURE PERFORMED:  Flexible cystoscopy with left ureteral stent removal.      ANESTHESIA:  Local.      COMPLICATIONS:  None.      INDICATIONS FOR PROCEDURE:  Amanda Dias is a 53-year-old woman who underwent stone extraction recently and had a stent placed.  She is now here for stent removal in the operating room.      DETAILS OF THE PROCEDURE:  The risks and benefits of the procedure were explained in detail to the patient and informed consent was obtained.  The patient was brought to the operating room and she left on the gurney.  She was put in a frogleg position and then prepped and the perineum was prepped and draped in standard sterile fashion.      I inserted the flexible scope and removed the stent without difficulty.        The patient tolerated the procedure without complications.  She went to the post-anesthetic care in good condition.  She will go back to our transitional care unit from there.  I will see her back in 1 year to check a KUB to make certain she is not forming any new stones.         MAYITO CHAUHAN MD             D: 2019   T: 2019   MT: ROSE      Name:     AMANDA SYLVESTER   MRN:      [id number]        Account:        [id number]   :      1965           Procedure Date: 2019      Document: [id number]

## 2019-03-27 NOTE — PROGRESS NOTES
[facility]  PRIMARY CARE PROVIDER AND CLINIC:  Julius Hassan MD, [address] [facility] [address]  Chief Complaint   Patient presents with     Hospital F/U     [facility] Medical Record Number:  [id number]  Place of Service where encounter took place:  [facility] ([facility] [[id number]]    Amanda Richardson  is a 53 year old  (1965), re-admitted to the above facility from  [facility]. Hospital stay 3/16/19 - 3/26/19. .  Admitted to this facility for  rehab, medical management and nursing care.    HPI:    HPI information obtained from: facility chart records, facility staff, patient report and [facility] chart review.   Brief Summary of Hospital Course:   Patient developed septic shock secondary to infected left ureteral stone. UC + VRE and pseduomonas. Was followed by ID.     Updates on Status Since Skilled nursing Admission:   1. Septic shock (H)    2. Infected Left ureteral stone    3. Acute renal failure, unspecified acute renal failure type (H)    4. MS (multiple sclerosis) (H)    5. Chronic pain syndrome    6. Essential hypertension    7. Borderline type 2 diabetes mellitus    8. Anemia, unspecified type    9. Pressure injury of skin of buttock, unspecified injury stage, unspecified laterality    10. Closed fracture of neck of right femur with delayed healing, subsequent encounter    11. Physical deconditioning      Ms. Richardson seen today for admission to TCU after recent hospital stay. She reports she is doing well. She is feeling much better than previously. It is suspected that her previous UTI were likely infections caused by this stone. She was asymptomatic when stone was in place. Denies any pain today. Reports bowels are moving well. She also reports she has a pressure ulcer to her buttocks. She is here for therapy. She is in wheelchair at baseline. Lives with her spouse. Is able to transfer at home. Feels weak now.     Data collected  from [facility]  (in italics):  BP: 131-151/65-74 mmHg   P: 85-97 bpm   Blood Sugar:   03/27/2019 19:41   Blood Sugar: 132 mg/dL   03/27/2019 09:39   Blood Sugar: 126 mg/dL   03/26/2019 23:27   Blood Sugar: 190 mg/dL      CODE STATUS/ADVANCE DIRECTIVES DISCUSSION:   CPR/Full code   Patient's living condition: lives with spouse  ALLERGIES: Lisinopril; Cyclobenzaprine; and Flexeril [cyclobenzaprine hcl]  PAST MEDICAL HISTORY:  has a past medical history of Abnormality of gait (7/27/2012), Arrhythmia, Chronic pain, CKD (chronic kidney disease) stage 1, GFR 90 ml/min or greater, Colon polyps (1/15), Gallstone (6/11/2012), Hyperlipidemia LDL goal <70, Hypertension goal BP (blood pressure) < 140/90, Leukocytosis (6/11/2012), Moderate depressive episode (H), MS (multiple sclerosis) (H) (2003), Multiple sclerosis (H), Non Hodgkin's lymphoma (H) (06/11/2012), Nonallopathic lesion of cervical region, not elsewhere classified (9/24/2012), Nonallopathic lesion of thoracic region, not elsewhere classified (9/24/2012), Numbness and tingling, Obesity (6/11/2012), Other chronic pain, Pain in joint, pelvic region and thigh (7/20/2012), Prediabetes, Spinal stenosis, lumbar (6/17/2012), T-cell lymphoma (H) (10/2017), Tobacco abuse (06/11/2012), and Type 2 diabetes, HbA1c goal < 7% (). She also has no past medical history of Chronic infection, Malignant hyperthermia, PONV (postoperative nausea and vomiting), or Sleep apnea.  PAST SURGICAL HISTORY:   has a past surgical history that includes Fusion lumbar anterior, fusion lumbar posterior two levels, combined (10/17/2013); Colonoscopy (N/A, 1/7/2015); Open reduction internal fixation ankle (5/15); Open reduction internal fixation ankle (Right, 11/2015); Insert pump baclofen (04/2017); Irrigation and debridement lower extremity, combined (Right, 2015); XR Lumbar Epidural Injection Incl Imaging (3/14); sinus surgery (2011); Combined Cystoscopy, Retrogrades,  Ureteroscopy, Insert Stent (Left, 1/30/2019); Combined Cystoscopy, Retrogrades, Ureteroscopy, Laser Holmium Lithotripsy Ureter(S), Insert Stent (Left, 3/15/2019); and Cystoscopy, remove stent(s), combined (Left, 3/27/2019).  FAMILY HISTORY: family history includes Breast Cancer (age of onset: 58) in her sister; C.A.D. in her father; Cancer in her father; Hypertension in her mother; Thyroid Disease in her mother and son.  SOCIAL HISTORY:   reports that she quit smoking about 5 years ago. Her smoking use included cigarettes. She smoked 0.50 packs per day. she has never used smokeless tobacco. She reports that she does not drink alcohol or use drugs.    Post Discharge Medication Reconciliation Status: discharge medications reconciled and changed, per note/orders (see AVS)    Current Outpatient Medications   Medication Sig Dispense Refill     amitriptyline (ELAVIL) 100 MG tablet Take 1 tablet (100 mg) by mouth At Bedtime 90 tablet 3     atorvastatin (LIPITOR) 10 MG tablet Take 1 tablet (10 mg) by mouth daily 90 tablet 3     baclofen (LIORESAL) 10 MG tablet Take 10 mg by mouth 3 times daily       baclofen (LIORESAL) 10 MG tablet Take 10 mg by mouth daily as needed for muscle spasms       baclofen (LIORESAL) intraTHECAL Internal Pump by Intrathecal route continuous prn 3/18/19 - Managed by [facility], spoke to Jolene at [phone]  Pump contains baclofen 2000mcg/ml  Alarm date: 3/29/19  Dose-413mcg/day  Reservoir contains 2ml after the alarm date.       bisacodyl (DULCOLAX) 10 MG suppository Place 1 suppository (10 mg) rectally daily as needed for constipation       cholecalciferol 1000 units TABS Take 1,000 Units by mouth daily       ciprofloxacin (CIPRO) 500 MG tablet Take 1 tablet (500 mg) by mouth every 12 hours for 5 days       docusate sodium (COLACE) 100 MG capsule Take 100 mg by mouth 2 times daily as needed for constipation       furosemide (LASIX) 40 MG tablet Take 40 mg by mouth daily        "gabapentin (NEURONTIN) 300 MG capsule Take 600 mg by mouth 3 times daily       hydrochlorothiazide (MICROZIDE) 12.5 MG capsule Take 12.5 mg by mouth daily       losartan (COZAAR) 50 MG tablet Take 1 tablet (50 mg) by mouth daily 30 tablet 0     metFORMIN (GLUCOPHAGE) 500 MG tablet Take 1 tablet (500 mg) by mouth daily (with dinner) 90 tablet 3     metoprolol succinate (TOPROL-XL) 50 MG 24 hr tablet TAKE 1 TABLET BY MOUTH ONCE DAILY 90 tablet 1     Multiple Vitamin (MULTI-VITAMIN PO) Take 1 tablet by mouth daily        omega-3 fatty acids (FISH OIL) 1200 MG capsule Take 1 capsule by mouth daily.       oxyCODONE IR (ROXICODONE) 15 MG tablet Take 0.5-1 tablets (7.5-15 mg) by mouth every 4 hours as needed for moderate to severe pain Limit 4 tablet(s) per day. 20 tablet 0     polyethylene glycol (MIRALAX/GLYCOLAX) packet Take 17 g by mouth daily       polyethylene glycol (MIRALAX/GLYCOLAX) packet Take 1 packet by mouth 2 times daily as needed for constipation       POTASSIUM CHLORIDE ER PO Take 20 mEq by mouth daily       senna-docusate (SENOKOT-S/PERICOLACE) 8.6-50 MG tablet Take 3 tablets by mouth 2 times daily 180 tablet 0       ROS:  10 point ROS of systems including Constitutional, Eyes, Respiratory, Cardiovascular, Gastroenterology, Genitourinary, Integumentary, Musculoskeletal, Neurological, Psychiatric were all negative except for pertinent positives noted in my HPI.    Vitals:  /72   Pulse 85   Temp 98.6  F (37  C)   Resp 18   Ht 1.702 m (5' 7\")   Wt 112.9 kg (249 lb)   LMP 12/11/2011   SpO2 98%   BMI 39.00 kg/m    Exam:  GENERAL APPEARANCE:  Alert, pleasant and cooperative, lying in bed on exam  EYES:  EOM, lids, pupils and irises normal, sclera clear and conjunctiva normal, no discharge or mattering on lids or lashes noted  ENT:  Mouth normal, moist mucous membranes, nose without drainage or crusting, external ears without lesions, hearing acuity intact  RESP:  respiratory effort normal, no " respiratory distress, Lung sounds clear, patient is on RA  CV: auscultation of heart done, rate and rhythm regular. Generalized edema  ABDOMEN:  normal bowel sounds, soft, nontender, no grimacing or guarding with palpation.  M/S:   Wheelchair bound at baseline; Digits and nails normal, no tenderness or swelling of the joints; very weak to bilateral lower extremities- not able to move RLE against gravity, able to move LLE minimally  SKIN:  Inspection and palpation of skin and subcutaneous tissue: skin warm, dry without rashes, bilateral lower extremities with darker pigmented skin and some redness- she reports this is her baseline, buttock with pressure ulcer present- with open skin -larger in size on left upper buttock than right.  NEURO: cranial nerves 2-12 grossly intact, no facial asymmetry, no speech deficits and able to follow directions  PSYCH: oriented x4, insight and judgement intact, memory intact, affect and mood normal      Lab/Diagnostic data:  Recent labs in [facility] reviewed by me today.     ASSESSMENT/PLAN:  (A41.9,  R65.21) Septic shock (H)  (primary encounter diagnosis)  (N20.1) Infected Left ureteral stone  Comment: Acute, improving.   -UC + VRE and pseduomonas  -Completed daptomycin prior to hospital discharge  -ureteral stent removed by urology yesterday  Plan: Continues cipro through 3/31. Continue to encourage fluids. CBC 3/29. Follow up with urology in 1 year with KUB to ensure she is not forming any new stones.     (N17.9) Acute renal failure, unspecified acute renal failure type (H)  Comment: acute, related to sepsis, improved prior to hospital discharge  Creatinine   Date Value Ref Range Status   03/22/2019 0.63 0.52 - 1.04 mg/dL Final     Plan: BMP 3/29. Monitor     (R73.03) Borderline type 2 diabetes mellitus  Comment: Chronic, BS meeting goal        Lab Results   Component Value Date     A1C 6.6 02/09/2019     A1C 6.4 01/30/2019     A1C 6.8 06/20/2018     A1C 6.5 10/02/2017     A1C 6.6  05/02/2016      Plan: Continue metformin.. BS M, Th AM fasting and at bedtime. Carb consistent diet.     (G35) MS (multiple sclerosis) (H)  Comment: Chronic - w/c bound at baseline. Resides at home with  - is mostly independent at home with pivot transferring.   Plan: Therapies as below.      (G89.4) Chronic pain syndrome  Comment: Chronic   Plan: Continue Amitriptyline, Baclofen, Neurontin, Oxycodone, and Cymbalta. Appointment today to have baclofen pump refilled  .   (I10) Hypertension goal BP (blood pressure) < 140/90  Comment: chronic, little data since admission  Plan: Based on JNC-8 goals,  patients age of 53 year old, presence of diabetes or CKD, and goals of care goal BP is <150/90 mm Hg. Continue Metoprolol, Losartan, Lasix and hydrochlorothiazide. VS per TCU policy. BMP 3/14 as ordered     (D64.9) Anemia, unspecified type  Comment: Chronic, hgb at baseline  Hemoglobin   Date Value Ref Range Status   03/22/2019 9.4 (L) 11.7 - 15.7 g/dL Final   03/21/2019 9.4 (L) 11.7 - 15.7 g/dL Final     -Baseline hgb 9-10  Plan: CBC 3/29. Monitor for s.sx of bleeding    (L89.309) Pressure injury of skin of buttock  Comment: Acute, located across both buttock- with open areas noted  Plan: Per WOC nurse change q3 days    1. Dab clean with wound spray and pat dry    2. Cover open wound with adaptic cut to size    3. Cover all area with Mepilex dressings- use Sacral or 4x4 to cover the area, date    4. Use No sting barrier to help adhere.   WOC nurse at TCU notified. Air mattress to bed. Reposition q2h. Dietician to follow to optimize diet    (S72.001G) Closed fracture of neck of right femur with delayed healing, subsequent encounter  Comment: Noted during recent hospitalization  CT from 1/30/19 showed Mildly displaced, impacted right femoral neck fracture, age  indeterminate, but could be recent.  Plan: Pain control as above. Monitor. Therapy as below    (R53.81) Physical deconditioning  Comment: Acute, related to  recent hospitalization, illnesses as above  Plan: Encourage participation in physical therapy/occupational therapy for strengthening and deconditioning. Discharge planning per their recommendation. Social work to assist with d/c planning.        Total time spent with patient visit at the skilled nursing facility was 39 minutes including patient visit, review of past records and review of admission orders, medication reconciliation. Greater than 50% of total time spent with counseling and coordinating care due to sepsis, infected ureteral stone and other problems as above  Electronically signed by:  BAYLEE Mg CNP

## 2019-03-28 ENCOUNTER — DOCUMENTATION ONLY (OUTPATIENT)
Dept: OTHER | Facility: CLINIC | Age: 54
End: 2019-03-28

## 2019-03-28 ENCOUNTER — RECORDS - HEALTHEAST (OUTPATIENT)
Dept: LAB | Facility: CLINIC | Age: 54
End: 2019-03-28

## 2019-03-28 ENCOUNTER — AMBULATORY - HEALTHEAST (OUTPATIENT)
Dept: OTHER | Facility: CLINIC | Age: 54
End: 2019-03-28

## 2019-03-28 ENCOUNTER — NURSING HOME VISIT (OUTPATIENT)
Dept: GERIATRICS | Facility: CLINIC | Age: 54
End: 2019-03-28
Payer: COMMERCIAL

## 2019-03-28 VITALS
RESPIRATION RATE: 18 BRPM | HEART RATE: 85 BPM | SYSTOLIC BLOOD PRESSURE: 139 MMHG | WEIGHT: 249 LBS | DIASTOLIC BLOOD PRESSURE: 72 MMHG | OXYGEN SATURATION: 98 % | HEIGHT: 67 IN | TEMPERATURE: 98.6 F | BODY MASS INDEX: 39.08 KG/M2

## 2019-03-28 DIAGNOSIS — R53.81 PHYSICAL DECONDITIONING: ICD-10-CM

## 2019-03-28 DIAGNOSIS — G35 MS (MULTIPLE SCLEROSIS) (H): ICD-10-CM

## 2019-03-28 DIAGNOSIS — N17.9 ACUTE RENAL FAILURE, UNSPECIFIED ACUTE RENAL FAILURE TYPE (H): ICD-10-CM

## 2019-03-28 DIAGNOSIS — L89.309 PRESSURE INJURY OF SKIN OF BUTTOCK, UNSPECIFIED INJURY STAGE, UNSPECIFIED LATERALITY: ICD-10-CM

## 2019-03-28 DIAGNOSIS — I10 ESSENTIAL HYPERTENSION: ICD-10-CM

## 2019-03-28 DIAGNOSIS — N20.1 LEFT URETERAL STONE: ICD-10-CM

## 2019-03-28 DIAGNOSIS — A41.9 SEPTIC SHOCK (H): Primary | ICD-10-CM

## 2019-03-28 DIAGNOSIS — R73.03 BORDERLINE TYPE 2 DIABETES MELLITUS: ICD-10-CM

## 2019-03-28 DIAGNOSIS — G89.4 CHRONIC PAIN SYNDROME: ICD-10-CM

## 2019-03-28 DIAGNOSIS — R65.21 SEPTIC SHOCK (H): Primary | ICD-10-CM

## 2019-03-28 DIAGNOSIS — D64.9 ANEMIA, UNSPECIFIED TYPE: ICD-10-CM

## 2019-03-28 DIAGNOSIS — S72.001G CLOSED FRACTURE OF NECK OF RIGHT FEMUR WITH DELAYED HEALING, SUBSEQUENT ENCOUNTER: ICD-10-CM

## 2019-03-28 PROCEDURE — 99310 SBSQ NF CARE HIGH MDM 45: CPT | Performed by: NURSE PRACTITIONER

## 2019-03-28 NOTE — LETTER
3/28/2019        RE: Amanda Richardson  [address] [place] [address]        [facility]  PRIMARY CARE PROVIDER AND CLINIC:  Julius Hassan MD, [address] / [facility] [address]  Chief Complaint   Patient presents with     Hospital F/U     [place] Medical Record Number:  [id number]  Place of Service where encounter took place:  [facility] ([facility]) [[id number]]    Amanda Richardson  is a 53 year old  (1965), re-admitted to the above facility from  [facility]. Hospital stay 3/16/19 - 3/26/19. .  Admitted to this facility for  rehab, medical management and nursing care.    HPI:    HPI information obtained from: facility chart records, facility staff, patient report and [facility] chart review.   Brief Summary of Hospital Course:   Patient developed septic shock secondary to infected left ureteral stone. UC + VRE and pseduomonas. Was followed by ID.     Updates on Status Since Skilled nursing Admission:   1. Septic shock (H)    2. Infected Left ureteral stone    3. Acute renal failure, unspecified acute renal failure type (H)    4. MS (multiple sclerosis) (H)    5. Chronic pain syndrome    6. Essential hypertension    7. Borderline type 2 diabetes mellitus    8. Anemia, unspecified type    9. Pressure injury of skin of buttock, unspecified injury stage, unspecified laterality    10. Closed fracture of neck of right femur with delayed healing, subsequent encounter    11. Physical deconditioning      Ms. Richardson seen today for admission to TCU after recent hospital stay. She reports she is doing well. She is feeling much better than previously. It is suspected that her previous UTI were likely infections caused by this stone. She was asymptomatic when stone was in place. Denies any pain today. Reports bowels are moving well. She also reports she has a pressure ulcer to her buttocks. She is here for therapy. She is in wheelchair at  baseline. Lives with her spouse. Is able to transfer at home. Feels weak now.     Data collected from [facility] System  (in italics):  BP: 131-151/65-74 mmHg   P: 85-97 bpm   Blood Sugar:   03/27/2019 19:41   Blood Sugar: 132 mg/dL   03/27/2019 09:39   Blood Sugar: 126 mg/dL   03/26/2019 23:27   Blood Sugar: 190 mg/dL      CODE STATUS/ADVANCE DIRECTIVES DISCUSSION:   CPR/Full code   Patient's living condition: lives with spouse  ALLERGIES: Lisinopril; Cyclobenzaprine; and Flexeril [cyclobenzaprine hcl]  PAST MEDICAL HISTORY:  has a past medical history of Abnormality of gait (7/27/2012), Arrhythmia, Chronic pain, CKD (chronic kidney disease) stage 1, GFR 90 ml/min or greater, Colon polyps (1/15), Gallstone (6/11/2012), Hyperlipidemia LDL goal <70, Hypertension goal BP (blood pressure) < 140/90, Leukocytosis (6/11/2012), Moderate depressive episode (H), MS (multiple sclerosis) (H) (2003), Multiple sclerosis (H), Non Hodgkin's lymphoma (H) (06/11/2012), Nonallopathic lesion of cervical region, not elsewhere classified (9/24/2012), Nonallopathic lesion of thoracic region, not elsewhere classified (9/24/2012), Numbness and tingling, Obesity (6/11/2012), Other chronic pain, Pain in joint, pelvic region and thigh (7/20/2012), Prediabetes, Spinal stenosis, lumbar (6/17/2012), T-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 She also has no past medical history of Chronic infection, Malignant hyperthermia, PONV (postoperative nausea and vomiting), or Sleep apnea.  PAST SURGICAL HISTORY:   has a past surgical history that includes Fusion lumbar anterior, fusion lumbar posterior two levels, combined (10/17/2013); Colonoscopy (N/A, 1/7/2015); Open reduction internal fixation ankle (5/15); Open reduction internal fixation ankle (Right, 11/2015); Insert pump baclofen (04/2017); Irrigation and debridement lower extremity, combined (Right, 2015); XR Lumbar Epidural  Injection Incl Imaging (3/14); sinus surgery (2011); Combined Cystoscopy, Retrogrades, Ureteroscopy, Insert Stent (Left, 1/30/2019); Combined Cystoscopy, Retrogrades, Ureteroscopy, Laser Holmium Lithotripsy Ureter(S), Insert Stent (Left, 3/15/2019); and Cystoscopy, remove stent(s), combined (Left, 3/27/2019).  FAMILY HISTORY: family history includes Breast Cancer (age of onset: 58) in her sister; C.A.D. in her father; Cancer in her father; Hypertension in her mother; Thyroid Disease in her mother and son.  SOCIAL HISTORY:   reports that she quit smoking about 5 years ago. Her smoking use included cigarettes. She smoked 0.50 packs per day. she has never used smokeless tobacco. She reports that she does not drink alcohol or use drugs.    Post Discharge Medication Reconciliation Status: discharge medications reconciled and changed, per note/orders (see AVS)    Current Outpatient Medications   Medication Sig Dispense Refill     amitriptyline (ELAVIL) 100 MG tablet Take 1 tablet (100 mg) by mouth At Bedtime 90 tablet 3     atorvastatin (LIPITOR) 10 MG tablet Take 1 tablet (10 mg) by mouth daily 90 tablet 3     baclofen (LIORESAL) 10 MG tablet Take 10 mg by mouth 3 times daily       baclofen (LIORESAL) 10 MG tablet Take 10 mg by mouth daily as needed for muscle spasms       baclofen (LIORESAL) intraTHECAL Internal Pump by Intrathecal route continuous prn 3/18/19 - Managed by [facility], spoke to Jolene at [phone]  Pump contains baclofen 2000mcg/ml  Alarm date: 3/29/19  Dose-413mcg/day  Reservoir contains 2ml after the alarm date.       bisacodyl (DULCOLAX) 10 MG suppository Place 1 suppository (10 mg) rectally daily as needed for constipation       cholecalciferol 1000 units TABS Take 1,000 Units by mouth daily       ciprofloxacin (CIPRO) 500 MG tablet Take 1 tablet (500 mg) by mouth every 12 hours for 5 days       docusate sodium (COLACE) 100 MG capsule Take 100 mg by mouth 2 times daily as needed  "for constipation       furosemide (LASIX) 40 MG tablet Take 40 mg by mouth daily       gabapentin (NEURONTIN) 300 MG capsule Take 600 mg by mouth 3 times daily       hydrochlorothiazide (MICROZIDE) 12.5 MG capsule Take 12.5 mg by mouth daily       losartan (COZAAR) 50 MG tablet Take 1 tablet (50 mg) by mouth daily 30 tablet 0     metFORMIN (GLUCOPHAGE) 500 MG tablet Take 1 tablet (500 mg) by mouth daily (with dinner) 90 tablet 3     metoprolol succinate (TOPROL-XL) 50 MG 24 hr tablet TAKE 1 TABLET BY MOUTH ONCE DAILY 90 tablet 1     Multiple Vitamin (MULTI-VITAMIN PO) Take 1 tablet by mouth daily        omega-3 fatty acids (FISH OIL) 1200 MG capsule Take 1 capsule by mouth daily.       oxyCODONE IR (ROXICODONE) 15 MG tablet Take 0.5-1 tablets (7.5-15 mg) by mouth every 4 hours as needed for moderate to severe pain Limit 4 tablet(s) per day. 20 tablet 0     polyethylene glycol (MIRALAX/GLYCOLAX) packet Take 17 g by mouth daily       polyethylene glycol (MIRALAX/GLYCOLAX) packet Take 1 packet by mouth 2 times daily as needed for constipation       POTASSIUM CHLORIDE ER PO Take 20 mEq by mouth daily       senna-docusate (SENOKOT-S/PERICOLACE) 8.6-50 MG tablet Take 3 tablets by mouth 2 times daily 180 tablet 0       ROS:  10 point ROS of systems including Constitutional, Eyes, Respiratory, Cardiovascular, Gastroenterology, Genitourinary, Integumentary, Musculoskeletal, Neurological, Psychiatric were all negative except for pertinent positives noted in my HPI.    Vitals:  /72   Pulse 85   Temp 98.6  F (37  C)   Resp 18   Ht 1.702 m (5' 7\")   Wt 112.9 kg (249 lb)   LMP 12/11/2011   SpO2 98%   BMI 39.00 kg/m     Exam:  GENERAL APPEARANCE:  Alert, pleasant and cooperative, lying in bed on exam  EYES:  EOM, lids, pupils and irises normal, sclera clear and conjunctiva normal, no discharge or mattering on lids or lashes noted  ENT:  Mouth normal, moist mucous membranes, nose without drainage or crusting, external " ears without lesions, hearing acuity intact  RESP:  respiratory effort normal, no respiratory distress, Lung sounds clear, patient is on RA  CV: auscultation of heart done, rate and rhythm regular. Generalized edema  ABDOMEN:  normal bowel sounds, soft, nontender, no grimacing or guarding with palpation.  M/S:   Wheelchair bound at baseline; Digits and nails normal, no tenderness or swelling of the joints; very weak to bilateral lower extremities- not able to move RLE against gravity, able to move LLE minimally  SKIN:  Inspection and palpation of skin and subcutaneous tissue: skin warm, dry without rashes, bilateral lower extremities with darker pigmented skin and some redness- she reports this is her baseline, buttock with pressure ulcer present- with open skin -larger in size on left upper buttock than right.  NEURO: cranial nerves 2-12 grossly intact, no facial asymmetry, no speech deficits and able to follow directions  PSYCH: oriented x4, insight and judgement intact, memory intact, affect and mood normal      Lab/Diagnostic data:  Recent labs in [facility] reviewed by me today.     ASSESSMENT/PLAN:  (A41.9,  R65.21) Septic shock (H)  (primary encounter diagnosis)  (N20.1) Infected Left ureteral stone  Comment: Acute, improving.   -UC + VRE and pseduomonas  -Completed daptomycin prior to hospital discharge  -ureteral stent removed by urology yesterday  Plan: Continues cipro through 3/31. Continue to encourage fluids. CBC 3/29. Follow up with urology in 1 year with KUB to ensure she is not forming any new stones.     (N17.9) Acute renal failure, unspecified acute renal failure type (H)  Comment: acute, related to sepsis, improved prior to hospital discharge  Creatinine   Date Value Ref Range Status   03/22/2019 0.63 0.52 - 1.04 mg/dL Final     Plan: BMP 3/29. Monitor     (R73.03) Borderline type 2 diabetes mellitus  Comment: Chronic, BS meeting goal        Lab Results   Component Value Date     A1C 6.6 02/09/2019      A1C 6.4 01/30/2019     A1C 6.8 06/20/2018     A1C 6.5 10/02/2017     A1C 6.6 05/02/2016      Plan: Continue metformin.. BS M, Th AM fasting and at bedtime. Carb consistent diet.     (G35) MS (multiple sclerosis) (H)  Comment: Chronic - w/c bound at baseline. Resides at home with  - is mostly independent at home with pivot transferring.   Plan: Therapies as below.      (G89.4) Chronic pain syndrome  Comment: Chronic   Plan: Continue Amitriptyline, Baclofen, Neurontin, Oxycodone, and Cymbalta. Appointment today to have baclofen pump refilled  .   (I10) Hypertension goal BP (blood pressure) < 140/90  Comment: chronic,  little data since admission  Plan: Based on JNC-8 goals,  patients age of 53 year old, presence of diabetes or CKD, and goals of care goal BP is <150/90 mm Hg. Continue Metoprolol, Losartan, Lasix and hydrochlorothiazide. VS per TCU policy. BMP 3/14 as ordered     (D64.9) Anemia, unspecified type  Comment: Chronic, hgb at baseline  Hemoglobin   Date Value Ref Range Status   03/22/2019 9.4 (L) 11.7 - 15.7 g/dL Final   03/21/2019 9.4 (L) 11.7 - 15.7 g/dL Final     -Baseline hgb 9-10  Plan: CBC  3/29. Monitor for s.sx of bleeding    (L89.309) Pressure injury of skin of buttock  Comment: Acute, located across both buttock- with open areas noted  Plan: Per WOC nurse change q3 days    1. Dab clean with wound spray and pat dry    2. Cover open wound with adaptic cut to size    3. Cover all area with Mepilex dressings- use Sacral or 4x4 to cover the area, date    4. Use No sting barrier to help adhere.   WOC nurse at TCU notified. Air mattress to bed. Reposition q2h. Dietician to follow to optimize diet    (S72.001G) Closed fracture of neck of right femur with delayed healing, subsequent encounter  Comment: Noted during recent hospitalization  CT from 1/30/19 showed Mildly displaced, impacted right femoral neck fracture, age  indeterminate, but could be recent.  Plan: Pain control as above.  Monitor. Therapy as below    (R53.81) Physical deconditioning  Comment: Acute, related to recent hospitalization, illnesses as above  Plan: Encourage participation in physical therapy/occupational therapy for strengthening and deconditioning. Discharge planning per their recommendation. Social work to assist with d/c planning.        Total time spent with patient visit at the [facility] nursing [facility] was 39 minutes including patient visit, review of past records and review of admission orders, medication reconciliation. Greater than 50% of total time spent with counseling and coordinating care due to sepsis, infected ureteral stone and other problems as above  Electronically signed by:  BAYLEE Mg CNP                     Sincerely,        BAYLEE Mg CNP

## 2019-03-29 LAB
ANION GAP SERPL CALCULATED.3IONS-SCNC: 11 MMOL/L (ref 5–18)
BUN SERPL-MCNC: 20 MG/DL (ref 8–22)
CALCIUM SERPL-MCNC: 9.8 MG/DL (ref 8.5–10.5)
CHLORIDE BLD-SCNC: 98 MMOL/L (ref 98–107)
CO2 SERPL-SCNC: 29 MMOL/L (ref 22–31)
CREAT SERPL-MCNC: 0.81 MG/DL (ref 0.6–1.1)
ERYTHROCYTE [DISTWIDTH] IN BLOOD BY AUTOMATED COUNT: 17.8 % (ref 11–14.5)
GFR SERPL CREATININE-BSD FRML MDRD: >60 ML/MIN/1.73M2
GLUCOSE BLD-MCNC: 110 MG/DL (ref 70–125)
HCT VFR BLD AUTO: 35.6 % (ref 35–47)
HGB BLD-MCNC: 10.5 G/DL (ref 12–16)
MCH RBC QN AUTO: 25.7 PG (ref 27–34)
MCHC RBC AUTO-ENTMCNC: 29.5 G/DL (ref 32–36)
MCV RBC AUTO: 87 FL (ref 80–100)
PLATELET # BLD AUTO: 555 THOU/UL (ref 140–440)
PMV BLD AUTO: 9.1 FL (ref 8.5–12.5)
POTASSIUM BLD-SCNC: 4.3 MMOL/L (ref 3.5–5)
RBC # BLD AUTO: 4.08 MILL/UL (ref 3.8–5.4)
SODIUM SERPL-SCNC: 138 MMOL/L (ref 136–145)
WBC: 14.2 THOU/UL (ref 4–11)

## 2019-03-31 ENCOUNTER — RECORDS - HEALTHEAST (OUTPATIENT)
Dept: LAB | Facility: CLINIC | Age: 54
End: 2019-03-31

## 2019-03-31 VITALS
DIASTOLIC BLOOD PRESSURE: 68 MMHG | WEIGHT: 248.1 LBS | RESPIRATION RATE: 20 BRPM | HEART RATE: 84 BPM | SYSTOLIC BLOOD PRESSURE: 138 MMHG | OXYGEN SATURATION: 94 % | HEIGHT: 67 IN | TEMPERATURE: 98.3 F | BODY MASS INDEX: 38.94 KG/M2

## 2019-03-31 ASSESSMENT — MIFFLIN-ST. JEOR: SCORE: 1753

## 2019-04-01 ENCOUNTER — TRANSFERRED RECORDS (OUTPATIENT)
Dept: HEALTH INFORMATION MANAGEMENT | Facility: CLINIC | Age: 54
End: 2019-04-01

## 2019-04-01 ENCOUNTER — NURSING HOME VISIT (OUTPATIENT)
Dept: GERIATRICS | Facility: CLINIC | Age: 54
End: 2019-04-01
Payer: COMMERCIAL

## 2019-04-01 DIAGNOSIS — N17.9 ACUTE RENAL FAILURE, UNSPECIFIED ACUTE RENAL FAILURE TYPE (H): ICD-10-CM

## 2019-04-01 DIAGNOSIS — A41.9 SEPTIC SHOCK (H): Primary | ICD-10-CM

## 2019-04-01 DIAGNOSIS — G35 MS (MULTIPLE SCLEROSIS) (H): ICD-10-CM

## 2019-04-01 DIAGNOSIS — E78.5 HYPERLIPIDEMIA LDL GOAL <70: ICD-10-CM

## 2019-04-01 DIAGNOSIS — R53.81 PHYSICAL DECONDITIONING: ICD-10-CM

## 2019-04-01 DIAGNOSIS — R73.03 BORDERLINE TYPE 2 DIABETES MELLITUS: ICD-10-CM

## 2019-04-01 DIAGNOSIS — R65.21 SEPTIC SHOCK (H): Primary | ICD-10-CM

## 2019-04-01 DIAGNOSIS — G89.4 CHRONIC PAIN SYNDROME: ICD-10-CM

## 2019-04-01 LAB
ERYTHROCYTE [DISTWIDTH] IN BLOOD BY AUTOMATED COUNT: 17.7 % (ref 11–14.5)
ERYTHROCYTE [DISTWIDTH] IN BLOOD BY AUTOMATED COUNT: 17.7 % (ref 11–14.5)
HCT VFR BLD AUTO: 33.2 % (ref 35–47)
HCT VFR BLD AUTO: 33.2 % (ref 35–47)
HEMOGLOBIN: 10.2 G/DL (ref 12–16)
HGB BLD-MCNC: 10.2 G/DL (ref 12–16)
MCH RBC QN AUTO: 26.2 PG (ref 27–34)
MCH RBC QN AUTO: 26.2 PG (ref 27–34)
MCHC RBC AUTO-ENTMCNC: 30.7 G/DL (ref 32–36)
MCHC RBC AUTO-ENTMCNC: 30.7 G/DL (ref 32–36)
MCV RBC AUTO: 85 FL (ref 80–100)
MCV RBC AUTO: 85 FL (ref 80–100)
PLATELET # BLD AUTO: 452 THOU/UL (ref 140–440)
PLATELET # BLD AUTO: 452 THOU/UL (ref 140–440)
PMV BLD AUTO: 9.1 FL (ref 8.5–12.5)
RBC # BLD AUTO: 3.9 MILL/UL (ref 3.8–5.4)
RBC # BLD AUTO: 3.9 MILL/UL (ref 3.8–5.4)
WBC # BLD AUTO: 8.3 THOU/UL (ref 4–11)
WBC: 8.3 THOU/UL (ref 4–11)

## 2019-04-01 PROCEDURE — 99309 SBSQ NF CARE MODERATE MDM 30: CPT | Performed by: INTERNAL MEDICINE

## 2019-04-01 NOTE — LETTER
4/1/2019        RE: Amanda Richardson  [address] [facility] [place] [address]          PRIMARY CARE PROVIDER AND CLINIC RESPONSIBLE:  Julius Hassan, [address] [facility] / [facility] [place] [address]        ADMISSION HISTORY AND PHYSICAL EXAMINATION     Chief Complaint   Patient presents with     Hospital F/U         HISTORY OF PRESENT ILLNESS:  53 year old female, (1965), [facility] medical care and [facility].    Pt admitted [facility] 3/16 to 3/26 for septic shock along with ARF in setting of infected L ureteral stone.    Pt denies any fevers/chills/chest pain/SOB/abdominal pain/urinary symptoms.    Please see Margaret Neal 's CNP admit noted dated 3/28 for details of admission, past medical history, family history, allergies, medication list, social history and other details pertinent with this admission. Hospital admission and dc summary reviewed.      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 Cystoscopy 2. LEFT retrograde pyelogram 3. LEFT JJ stent placement 4. <1hr physician fluoroscopy time;  Surgeon: Epifanio Sapp MD;  Location: [facility] OR     COMBINED CYSTOSCOPY, RETROGRADES, URETEROSCOPY, LASER HOLMIUM LITHOTRIPSY URETER(S), INSERT STENT Left 3/15/2019    Procedure: Cystoscopy, left ureteral stent exchange, left retrograde pyelogram, interpretation of fluoroscopic images, left ureteroscopy with holmium lithotripsy and stone basketing, 22 modifier for difficult lengthy case.;  Surgeon: Mayito Chauhan MD;  Location: [facility] OR     CYSTOSCOPY, REMOVE STENT(S), COMBINED Left 3/27/2019    Procedure: Flexible cystoscopy with left ureteral stent removal;  Surgeon: Mayito Chauhan MD;  Location: [facility] OR     FUSION LUMBAR ANTERIOR, FUSION LUMBAR POSTERIOR TWO LEVELS, COMBINED  10/17/2013    lumbar fusion - Dr Floyd     INSERT PUMP BACLOFEN  04/2017    intrathecal baclofen pump implantation     IRRIGATION AND DEBRIDEMENT LOWER EXTREMITY, COMBINED Right 2015    Right ankle I&D d/t infection     OPEN REDUCTION INTERNAL FIXATION ANKLE  5/15    Right Bimalleolar ankle fx ORIF     OPEN REDUCTION INTERNAL FIXATION ANKLE Right 11/2015    Revision due to spasms pulling screws out of ankle     SINUS SURGERY  2011    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     XR LUMBAR EPIDURAL INJECTION INCL IMAGING  3/14    Left L4-5 Epidural Dr Winter       Current Outpatient Medications   Medication  Sig     amitriptyline (ELAVIL) 100 MG tablet Take 1 tablet (100 mg) by mouth At Bedtime     atorvastatin (LIPITOR) 10 MG tablet Take 1 tablet (10 mg) by mouth daily     baclofen (LIORESAL) 10 MG tablet Take 10 mg by mouth 3 times daily     baclofen (LIORESAL) 10 MG tablet Take 10 mg by mouth daily as needed for muscle spasms     baclofen (LIORESAL) intraTHECAL Internal Pump by Intrathecal route continuous prn 3/18/19 - Managed by [facility], spoke to Jolene at [phone]  Pump contains baclofen 2000mcg/ml  Alarm date: 3/29/19  Dose-413mcg/day  Reservoir contains 2ml after the alarm date.     bisacodyl (DULCOLAX) 10 MG suppository Place 1 suppository (10 mg) rectally daily as needed for constipation     cholecalciferol 1000 units TABS Take 1,000 Units by mouth daily     docusate sodium (COLACE) 100 MG capsule Take 100 mg by mouth 2 times daily as needed for constipation     furosemide (LASIX) 40 MG tablet Take 40 mg by mouth daily     gabapentin (NEURONTIN) 300 MG capsule Take 600 mg by mouth 3 times daily     hydrochlorothiazide (MICROZIDE) 12.5 MG capsule Take 12.5 mg by mouth daily     losartan (COZAAR) 50 MG tablet Take 1 tablet (50 mg) by mouth daily     metFORMIN (GLUCOPHAGE) 500 MG tablet Take 1 tablet (500 mg) by mouth daily (with dinner)     metoprolol succinate (TOPROL-XL) 50 MG 24 hr tablet TAKE 1 TABLET BY MOUTH ONCE DAILY     Multiple Vitamin (MULTI-VITAMIN PO) Take 1 tablet by mouth daily      omega-3 fatty acids (FISH OIL) 1200 MG capsule Take 1 capsule by mouth daily.     oxyCODONE IR (ROXICODONE) 15 MG tablet Take 0.5-1 tablets (7.5-15 mg) by mouth every 4 hours as needed for moderate to severe pain Limit 4 tablet(s) per day.     polyethylene glycol (MIRALAX/GLYCOLAX) packet Take 17 g by mouth daily     polyethylene glycol (MIRALAX/GLYCOLAX) packet Take 1 packet by mouth 2 times daily as needed for constipation     POTASSIUM CHLORIDE ER PO Take 20 mEq by mouth daily      senna-docusate (SENOKOT-S/PERICOLACE) 8.6-50 MG tablet Take 3 tablets by mouth 2 times daily     No current facility-administered medications for this visit.        Allergies   Allergen Reactions     Lisinopril      Lip swelling     Cyclobenzaprine Other (See Comments)     Per 4-10-17 H&P by Yanna Dugan PA-C.     Flexeril [Cyclobenzaprine Hcl]      Got confused        Social History     Socioeconomic History     Marital status:      Spouse name: Fernando     Number of children: 3     Years of education: 14     Highest education level: Not on file   Occupational History     Employer: UNEMPLOYED   Social Needs     Financial resource strain: Not on file     Food insecurity:     Worry: Not on file     Inability: Not on file     Transportation needs:     Medical: Not on file     Non-medical: Not on file   Tobacco Use     Smoking status: Former Smoker     Packs/day: 0.50     Types: Cigarettes     Last attempt to quit: 2013     Years since quittin.6     Smokeless tobacco: Never Used     Tobacco comment: since age 17   Substance and Sexual Activity     Alcohol use: No     Drug use: No     Sexual activity: Yes     Partners: Male   Lifestyle     Physical activity:     Days per week: Not on file     Minutes per session: Not on file     Stress: Not on file   Relationships     Social connections:     Talks on phone: Not on file     Gets together: Not on file     Attends Rastafari service: Not on file     Active member of club or organization: Not on file     Attends meetings of clubs or organizations: Not on file     Relationship status: Not on file     Intimate partner violence:     Fear of current or ex partner: Not on file     Emotionally abused: Not on file     Physically abused: Not on file     Forced sexual activity: Not on file   Other Topics Concern     Parent/sibling w/ CABG, MI or angioplasty before 65F 55M? No      Service Not Asked     Blood Transfusions Not Asked     Caffeine Concern Yes      "Comment: occas     Occupational Exposure Not Asked     Hobby Hazards Not Asked     Sleep Concern Not Asked     Stress Concern Not Asked     Weight Concern Not Asked     Special Diet Not Asked     Back Care Not Asked     Exercise Yes     Comment: as able     Bike Helmet Not Asked     Seat Belt Yes     Self-Exams Not Asked   Social History Narrative     Not on file          Information reviewed:  Medications, vital signs, orders, nursing notes, problem list, hospital information.     ROS: All 10 point review of system completed, those pertinent positive, please see H&P, the remaining ROS is negative.    /68   Pulse 84   Temp 98.3  F (36.8  C)   Resp 20   Ht 1.702 m (5' 7\")   Wt 112.5 kg (248 lb 1.6 oz)   LMP 12/11/2011   SpO2 94%   BMI 38.86 kg/m       PHYSICAL EXAMINATION:   GENERAL:  No acute distress. Sitting on a WC.  SKIN:  Dry and warm.  There is no rash, lesions, ulcers or juandice at area of skin examined.  HEENT:  Head without trauma.  Pupils round, reactive. Exam of conjunctiva and lids are normal. Sclera without icterus. There is no oral thrush.  NECK:  Supple.  There is no cervical adenopathy, no thyromegaly. No jugular venous distension.  CHEST: No reproducible chest tenderness.   LUNGS:  Normal respiratory effort. Lungs are Clear on ascultation.  HEART:  Regular rate and rhythm.  No murmur, gallops or rubs auscultated.  ABDOMEN:  Soft, bowel sounds positive.  There is no tenderness or guarding.   EXTREMITIES: No edema.   NEUROLOGIC:  Alert and oriented x3.      Lab/Diagnostic data:  Reviewed    Lab Results   Component Value Date    WBC 17.0 03/22/2019     Lab Results   Component Value Date    RBC 3.59 03/22/2019     Lab Results   Component Value Date    HGB 9.4 03/22/2019     Lab Results   Component Value Date    HCT 30.4 03/22/2019     Lab Results   Component Value Date    MCV 85 03/22/2019     Lab Results   Component Value Date    MCH 26.2 03/22/2019     Lab Results   Component Value Date "    MCHC 30.9 03/22/2019     Lab Results   Component Value Date    RDW 17.5 03/22/2019     Lab Results   Component Value Date     03/25/2019       Last Comprehensive Metabolic Panel:  Sodium   Date Value Ref Range Status   03/22/2019 137 133 - 144 mmol/L Final     Potassium   Date Value Ref Range Status   03/22/2019 3.4 3.4 - 5.3 mmol/L Final     Chloride   Date Value Ref Range Status   03/22/2019 101 94 - 109 mmol/L Final     Carbon Dioxide   Date Value Ref Range Status   03/22/2019 32 20 - 32 mmol/L Final     Anion Gap   Date Value Ref Range Status   03/22/2019 4 3 - 14 mmol/L Final     Glucose   Date Value Ref Range Status   03/22/2019 111 (H) 70 - 99 mg/dL Final     Urea Nitrogen   Date Value Ref Range Status   03/22/2019 15 7 - 30 mg/dL Final     Creatinine   Date Value Ref Range Status   03/22/2019 0.63 0.52 - 1.04 mg/dL Final     GFR Estimate   Date Value Ref Range Status   03/22/2019 >90 >60 mL/min/[1.73_m2] Final     Comment:     Non  GFR Calc  Starting 12/18/2018, serum creatinine based estimated GFR (eGFR) will be   calculated using the Chronic Kidney Disease Epidemiology Collaboration   (CKD-EPI) equation.       Calcium   Date Value Ref Range Status   03/22/2019 8.1 (L) 8.5 - 10.1 mg/dL Final       ASSESSMENT / PLAN:     Septic shock (H)  - occurred post lithotripsy 3/15, L ureteral exchange for L ureteral stone.  - UCx grew pseudomonas.  - Was in ICU and on MV with pressors.  - s/p Abx.  - f/u with urology as scheduled.    Acute renal failure, unspecified acute renal failure type (H)  - Resolved.  - Likely due to above.    MS (multiple sclerosis) (H)  - on baclofen.    Chronic pain syndrome  - on baclofen pump, elavil and oxycodone prn.    Borderline type 2 diabetes mellitus  - ADA diet.  - On metformin.  - A1C 6.3 on 3/2019.    Hyperlipidemia LDL goal <70  - on lipitor.    Benign Essential HTN.  - On lasix, hydrochlorothiazide, metoprolol and cozaar.    Closed fracture neck of R  femur.  - Time frame unknown.  - f/u with orthopedics.    Physical deconditioning  -Plan: PT/OT, fall precautions. Care conference with patient and family for the progress of rehab and disposition issues will be discussed as planned. Rehab evaluation and other evaluations including CPT are at rehab logs, to be reviewed separately.  Fall risk assessment as well as cognitive evaluation will be formed during rehab stay if indicated.      Other problems with same care. Primary care doctor and other specialists to address those chronic problems in next clinic appointment to be scheduled upon discharge from the TCU.    Total time spent with patient visit was 41 min including patient visit, review of past records, 1/2 time on patients counseling and coordinating care.            Sincerely,        Francisco Javier Briones MD

## 2019-04-01 NOTE — PROGRESS NOTES
PRIMARY CARE PROVIDER AND CLINIC RESPONSIBLE:  Julius Hassan, [address] [facility] / [facility] [address]        ADMISSION HISTORY AND PHYSICAL EXAMINATION     Chief Complaint   Patient presents with     Hospital F/U         HISTORY OF PRESENT ILLNESS:  53 year old female, (1965), admitted to the [facility] TCU for continuation of medical care and rehab.    Pt admitted [facility] 3/16 to 3/26 for septic shock along with ARF in setting of infected L ureteral stone.    Pt denies any fevers/chills/chest pain/SOB/abdominal pain/urinary symptoms.    Please see Margaret Neal 's CNP admit noted dated 3/28 for details of admission, past medical history, family history, allergies, medication list, social history and other details pertinent with this admission. Hospital admission and dc summary reviewed.      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 Cystoscopy 2. LEFT retrograde pyelogram 3. LEFT JJ stent placement 4. <1hr physician fluoroscopy time;  Surgeon: Epifanio Sapp MD;  Location: [facility] OR     COMBINED CYSTOSCOPY, RETROGRADES, URETEROSCOPY, LASER HOLMIUM LITHOTRIPSY URETER(S), INSERT STENT Left 3/15/2019    Procedure: Cystoscopy, left ureteral stent exchange, left retrograde pyelogram, interpretation of fluoroscopic images, left ureteroscopy with holmium lithotripsy and stone basketing, 22 modifier for difficult lengthy case.;  Surgeon: Mayito Chauhan MD;  Location: [facility] OR     CYSTOSCOPY, REMOVE STENT(S), COMBINED Left 3/27/2019    Procedure: Flexible cystoscopy with left ureteral stent removal;  Surgeon: Mayito Chauhan MD;  Location: [facility] OR     FUSION LUMBAR ANTERIOR, FUSION LUMBAR POSTERIOR TWO LEVELS, COMBINED  10/17/2013    lumbar fusion - Dr Floyd     INSERT PUMP BACLOFEN  04/2017    intrathecal baclofen pump implantation     IRRIGATION AND DEBRIDEMENT LOWER EXTREMITY, COMBINED Right 2015    Right ankle I&D d/t infection     OPEN REDUCTION INTERNAL FIXATION ANKLE  5/15    Right Bimalleolar ankle fx ORIF     OPEN REDUCTION INTERNAL FIXATION ANKLE Right 11/2015    Revision due to spasms pulling screws out of ankle     SINUS SURGERY  2011    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     XR LUMBAR EPIDURAL INJECTION INCL IMAGING  3/14    Left L4-5 Epidural Dr Winter       Current Outpatient Medications   Medication Sig     amitriptyline (ELAVIL) 100 MG tablet Take 1 tablet (100 mg) by mouth At Bedtime      atorvastatin (LIPITOR) 10 MG tablet Take 1 tablet (10 mg) by mouth daily     baclofen (LIORESAL) 10 MG tablet Take 10 mg by mouth 3 times daily     baclofen (LIORESAL) 10 MG tablet Take 10 mg by mouth daily as needed for muscle spasms     baclofen (LIORESAL) intraTHECAL Internal Pump by Intrathecal route continuous prn 3/18/19 - Managed by [facility], spoke to Jolene at [phone]  Pump contains baclofen 2000mcg/ml  Alarm date: 3/29/19  Dose-413mcg/day  Reservoir contains 2ml after the alarm date.     bisacodyl (DULCOLAX) 10 MG suppository Place 1 suppository (10 mg) rectally daily as needed for constipation     cholecalciferol 1000 units TABS Take 1,000 Units by mouth daily     docusate sodium (COLACE) 100 MG capsule Take 100 mg by mouth 2 times daily as needed for constipation     furosemide (LASIX) 40 MG tablet Take 40 mg by mouth daily     gabapentin (NEURONTIN) 300 MG capsule Take 600 mg by mouth 3 times daily     hydrochlorothiazide (MICROZIDE) 12.5 MG capsule Take 12.5 mg by mouth daily     losartan (COZAAR) 50 MG tablet Take 1 tablet (50 mg) by mouth daily     metFORMIN (GLUCOPHAGE) 500 MG tablet Take 1 tablet (500 mg) by mouth daily (with dinner)     metoprolol succinate (TOPROL-XL) 50 MG 24 hr tablet TAKE 1 TABLET BY MOUTH ONCE DAILY     Multiple Vitamin (MULTI-VITAMIN PO) Take 1 tablet by mouth daily      omega-3 fatty acids (FISH OIL) 1200 MG capsule Take 1 capsule by mouth daily.     oxyCODONE IR (ROXICODONE) 15 MG tablet Take 0.5-1 tablets (7.5-15 mg) by mouth every 4 hours as needed for moderate to severe pain Limit 4 tablet(s) per day.     polyethylene glycol (MIRALAX/GLYCOLAX) packet Take 17 g by mouth daily     polyethylene glycol (MIRALAX/GLYCOLAX) packet Take 1 packet by mouth 2 times daily as needed for constipation     POTASSIUM CHLORIDE ER PO Take 20 mEq by mouth daily     senna-docusate (SENOKOT-S/PERICOLACE) 8.6-50 MG tablet Take 3 tablets by mouth 2 times daily     No  current facility-administered medications for this visit.        Allergies   Allergen Reactions     Lisinopril      Lip swelling     Cyclobenzaprine Other (See Comments)     Per 4-10- H&P by Yanna Dugan PA-C.     Flexeril [Cyclobenzaprine Hcl]      Got confused        Social History     Socioeconomic History     Marital status:      Spouse name: Fernando     Number of children: 3     Years of education: 14     Highest education level: Not on file   Occupational History     Employer: UNEMPLOYED   Social Needs     Financial resource strain: Not on file     Food insecurity:     Worry: Not on file     Inability: Not on file     Transportation needs:     Medical: Not on file     Non-medical: Not on file   Tobacco Use     Smoking status: Former Smoker     Packs/day: 0.50     Types: Cigarettes     Last attempt to quit: 2013     Years since quittin.6     Smokeless tobacco: Never Used     Tobacco comment: since age 17   Substance and Sexual Activity     Alcohol use: No     Drug use: No     Sexual activity: Yes     Partners: Male   Lifestyle     Physical activity:     Days per week: Not on file     Minutes per session: Not on file     Stress: Not on file   Relationships     Social connections:     Talks on phone: Not on file     Gets together: Not on file     Attends Yarsanism service: Not on file     Active member of club or organization: Not on file     Attends meetings of clubs or organizations: Not on file     Relationship status: Not on file     Intimate partner violence:     Fear of current or ex partner: Not on file     Emotionally abused: Not on file     Physically abused: Not on file     Forced sexual activity: Not on file   Other Topics Concern     Parent/sibling w/ CABG, MI or angioplasty before 65F 55M? No      Service Not Asked     Blood Transfusions Not Asked     Caffeine Concern Yes     Comment: occas     Occupational Exposure Not Asked     Hobby Hazards Not Asked     Sleep Concern Not  "Asked     Stress Concern Not Asked     Weight Concern Not Asked     Special Diet Not Asked     Back Care Not Asked     Exercise Yes     Comment: as able     Bike Helmet Not Asked     Seat Belt Yes     Self-Exams Not Asked   Social History Narrative     Not on file          Information reviewed:  Medications, vital signs, orders, nursing notes, problem list, hospital information.     ROS: All 10 point review of system completed, those pertinent positive, please see H&P, the remaining ROS is negative.    /68   Pulse 84   Temp 98.3  F (36.8  C)   Resp 20   Ht 1.702 m (5' 7\")   Wt 112.5 kg (248 lb 1.6 oz)   LMP 12/11/2011   SpO2 94%   BMI 38.86 kg/m      PHYSICAL EXAMINATION:   GENERAL:  No acute distress. Sitting on a WC.  SKIN:  Dry and warm.  There is no rash, lesions, ulcers or juandice at area of skin examined.  HEENT:  Head without trauma.  Pupils round, reactive. Exam of conjunctiva and lids are normal. Sclera without icterus. There is no oral thrush.  NECK:  Supple.  There is no cervical adenopathy, no thyromegaly. No jugular venous distension.  CHEST: No reproducible chest tenderness.   LUNGS:  Normal respiratory effort. Lungs are Clear on ascultation.  HEART:  Regular rate and rhythm.  No murmur, gallops or rubs auscultated.  ABDOMEN:  Soft, bowel sounds positive.  There is no tenderness or guarding.   EXTREMITIES: No edema.   NEUROLOGIC:  Alert and oriented x3.      Lab/Diagnostic data:  Reviewed    Lab Results   Component Value Date    WBC 17.0 03/22/2019     Lab Results   Component Value Date    RBC 3.59 03/22/2019     Lab Results   Component Value Date    HGB 9.4 03/22/2019     Lab Results   Component Value Date    HCT 30.4 03/22/2019     Lab Results   Component Value Date    MCV 85 03/22/2019     Lab Results   Component Value Date    MCH 26.2 03/22/2019     Lab Results   Component Value Date    MCHC 30.9 03/22/2019     Lab Results   Component Value Date    RDW 17.5 03/22/2019     Lab Results "   Component Value Date     03/25/2019       Last Comprehensive Metabolic Panel:  Sodium   Date Value Ref Range Status   03/22/2019 137 133 - 144 mmol/L Final     Potassium   Date Value Ref Range Status   03/22/2019 3.4 3.4 - 5.3 mmol/L Final     Chloride   Date Value Ref Range Status   03/22/2019 101 94 - 109 mmol/L Final     Carbon Dioxide   Date Value Ref Range Status   03/22/2019 32 20 - 32 mmol/L Final     Anion Gap   Date Value Ref Range Status   03/22/2019 4 3 - 14 mmol/L Final     Glucose   Date Value Ref Range Status   03/22/2019 111 (H) 70 - 99 mg/dL Final     Urea Nitrogen   Date Value Ref Range Status   03/22/2019 15 7 - 30 mg/dL Final     Creatinine   Date Value Ref Range Status   03/22/2019 0.63 0.52 - 1.04 mg/dL Final     GFR Estimate   Date Value Ref Range Status   03/22/2019 >90 >60 mL/min/[1.73_m2] Final     Comment:     Non  GFR Calc  Starting 12/18/2018, serum creatinine based estimated GFR (eGFR) will be   calculated using the Chronic Kidney Disease Epidemiology Collaboration   (CKD-EPI) equation.       Calcium   Date Value Ref Range Status   03/22/2019 8.1 (L) 8.5 - 10.1 mg/dL Final       ASSESSMENT / PLAN:     Septic shock (H)  - occurred post lithotripsy 3/15, L ureteral exchange for L ureteral stone.  - UCx grew pseudomonas.  - Was in ICU and on MV with pressors.  - s/p Abx.  - f/u with urology as scheduled.    Acute renal failure, unspecified acute renal failure type (H)  - Resolved.  - Likely due to above.    MS (multiple sclerosis) (H)  - on baclofen.    Chronic pain syndrome  - on baclofen pump, elavil and oxycodone prn.    Borderline type 2 diabetes mellitus  - ADA diet.  - On metformin.  - A1C 6.3 on 3/2019.    Hyperlipidemia LDL goal <70  - on lipitor.    Benign Essential HTN.  - On lasix, hydrochlorothiazide, metoprolol and cozaar.    Closed fracture neck of R femur.  - Time frame unknown.  - f/u with orthopedics.    Physical deconditioning  -Plan: PT/OT, fall  precautions. Care conference with patient and family for the progress of rehab and disposition issues will be discussed as planned. Rehab evaluation and other evaluations including CPT are at rehab logs, to be reviewed separately.  Fall risk assessment as well as cognitive evaluation will be formed during rehab stay if indicated.      Other problems with same care. Primary care doctor and other specialists to address those chronic problems in next clinic appointment to be scheduled upon discharge from the TCU.    Total time spent with patient visit was 41 min including patient visit, review of past records, 1/2 time on patients counseling and coordinating care.

## 2019-04-03 DIAGNOSIS — M48.061 SPINAL STENOSIS OF LUMBAR REGION, UNSPECIFIED WHETHER NEUROGENIC CLAUDICATION PRESENT: ICD-10-CM

## 2019-04-03 DIAGNOSIS — G89.4 CHRONIC PAIN SYNDROME: Chronic | ICD-10-CM

## 2019-04-03 DIAGNOSIS — M54.16 LUMBAR RADICULOPATHY: ICD-10-CM

## 2019-04-03 DIAGNOSIS — G35 MS (MULTIPLE SCLEROSIS) (H): ICD-10-CM

## 2019-04-03 RX ORDER — OXYCODONE HYDROCHLORIDE 15 MG/1
7.5-15 TABLET ORAL EVERY 4 HOURS PRN
Qty: 20 TABLET | Refills: 0 | Status: SHIPPED | OUTPATIENT
Start: 2019-04-03 | End: 2019-04-09

## 2019-04-04 ENCOUNTER — NURSING HOME VISIT (OUTPATIENT)
Dept: GERIATRICS | Facility: CLINIC | Age: 54
End: 2019-04-04
Payer: COMMERCIAL

## 2019-04-04 VITALS
RESPIRATION RATE: 18 BRPM | WEIGHT: 247.6 LBS | HEIGHT: 67 IN | BODY MASS INDEX: 38.86 KG/M2 | SYSTOLIC BLOOD PRESSURE: 150 MMHG | DIASTOLIC BLOOD PRESSURE: 78 MMHG | TEMPERATURE: 99.2 F | OXYGEN SATURATION: 94 % | HEART RATE: 91 BPM

## 2019-04-04 DIAGNOSIS — N17.9 ACUTE RENAL FAILURE, UNSPECIFIED ACUTE RENAL FAILURE TYPE (H): ICD-10-CM

## 2019-04-04 DIAGNOSIS — G89.4 CHRONIC PAIN SYNDROME: ICD-10-CM

## 2019-04-04 DIAGNOSIS — A41.9 SEPTIC SHOCK (H): Primary | ICD-10-CM

## 2019-04-04 DIAGNOSIS — N20.1 LEFT URETERAL STONE: ICD-10-CM

## 2019-04-04 DIAGNOSIS — D64.9 ANEMIA, UNSPECIFIED TYPE: ICD-10-CM

## 2019-04-04 DIAGNOSIS — I10 ESSENTIAL HYPERTENSION: ICD-10-CM

## 2019-04-04 DIAGNOSIS — R65.21 SEPTIC SHOCK (H): Primary | ICD-10-CM

## 2019-04-04 DIAGNOSIS — S72.001G CLOSED FRACTURE OF NECK OF RIGHT FEMUR WITH DELAYED HEALING, SUBSEQUENT ENCOUNTER: ICD-10-CM

## 2019-04-04 DIAGNOSIS — L89.309 PRESSURE INJURY OF SKIN OF BUTTOCK, UNSPECIFIED INJURY STAGE, UNSPECIFIED LATERALITY: ICD-10-CM

## 2019-04-04 DIAGNOSIS — R73.03 BORDERLINE TYPE 2 DIABETES MELLITUS: ICD-10-CM

## 2019-04-04 DIAGNOSIS — G35 MS (MULTIPLE SCLEROSIS) (H): ICD-10-CM

## 2019-04-04 DIAGNOSIS — R53.81 PHYSICAL DECONDITIONING: ICD-10-CM

## 2019-04-04 PROCEDURE — 99309 SBSQ NF CARE MODERATE MDM 30: CPT | Performed by: NURSE PRACTITIONER

## 2019-04-04 ASSESSMENT — MIFFLIN-ST. JEOR: SCORE: 1750.74

## 2019-04-04 NOTE — PROGRESS NOTES
[facility]  [facility] Record Number:  [id number]  Place of Service where encounter took place:  [facility] ([facility]) [[id number]]  Chief Complaint   Patient presents with     RECHECK       HPI:    Amanda Richardson  is a 53 year old (1965), who is being seen today for an episodic care visit.  HPI information obtained from: facility chart records, facility staff, patient report and [facility] chart review. Today's concern is:  1. Septic shock (H)    2. Infected Left ureteral stone    3. Acute renal failure, unspecified acute renal failure type (H)    4. MS (multiple sclerosis) (H)    5. Chronic pain syndrome    6. Essential hypertension    7. Borderline type 2 diabetes mellitus    8. Anemia, unspecified type    9. Pressure injury of skin of buttock, unspecified injury stage, unspecified laterality    10. Closed fracture of neck of right femur with delayed healing, subsequent encounter    11. Physical deconditioning      Ms Richardson was seen today for follow up visit. Ms. Richardson is a 53 year old female that was recently in the hospital with septic shock secondary to infected left ureteral stone.     Nursing reported yesterday that she had increased bilateral lower extremity edema with right leg > left. US was obtained of RLE and was negative for blood clot. Today she reports that her edema is worse than at baseline. She has been sitting in her wheelchair frequently with her legs dangling.    Her pressure ulcer to her buttock/sacral area assessed today by [facility] nurse. With new open area noted today on right sacrum area about the size of pencil. Left sacral area 100% slough.     She is requiring ez stand for transfers, was using pivot slide board previously, but unable now due to pressure ulcer.     Reports bowels are moving well.    States she has a cold- no SOB, cough- with rhinitis, congestion, sore throat. Started yesterday.    Reports chronic pain is about 6/10 at  all times, occasionally worse. Baclofen pump refilled since last visit and her dose was increased. She has not noticed much of a change in her pain control since this adjustment.       Data collected from [facility] Matrix System  (in italics):  BP: 126-156/65-79 mmHg   P: 84-96 bpm   Blood Sugar:   04/01/2019 21:02   Blood Sugar: 147 mg/dL  04/01/2019 09:40   Blood Sugar: 121 mg/dL    Wt Readings from Last 4 Encounters:   04/04/19 112.3 kg (247 lb 9.6 oz)   03/31/19 112.5 kg (248 lb 1.6 oz)   03/27/19 112.9 kg (249 lb)   03/27/19 113.4 kg (250 lb)         Past Medical and Surgical History reviewed in Epic today.    MEDICATIONS:  Current Outpatient Medications   Medication Sig Dispense Refill     amitriptyline (ELAVIL) 100 MG tablet Take 1 tablet (100 mg) by mouth At Bedtime 90 tablet 3     atorvastatin (LIPITOR) 10 MG tablet Take 1 tablet (10 mg) by mouth daily 90 tablet 3     baclofen (LIORESAL) 10 MG tablet Take 10 mg by mouth 3 times daily       baclofen (LIORESAL) 10 MG tablet Take 10 mg by mouth daily as needed for muscle spasms       baclofen (LIORESAL) intraTHECAL Internal Pump by Intrathecal route continuous prn 3/18/19 - Managed by [facility], spoke to Jolene at [phone]  Pump contains baclofen 2000mcg/ml  Alarm date: 3/29/19  Dose-413mcg/day  Reservoir contains 2ml after the alarm date.       bisacodyl (DULCOLAX) 10 MG suppository Place 1 suppository (10 mg) rectally daily as needed for constipation       cholecalciferol 1000 units TABS Take 1,000 Units by mouth daily       docusate sodium (COLACE) 100 MG capsule Take 100 mg by mouth 2 times daily as needed for constipation       furosemide (LASIX) 40 MG tablet Take 40 mg by mouth daily       gabapentin (NEURONTIN) 300 MG capsule Take 600 mg by mouth 3 times daily       hydrochlorothiazide (MICROZIDE) 12.5 MG capsule Take 12.5 mg by mouth daily       losartan (COZAAR) 50 MG tablet Take 1 tablet (50 mg) by mouth daily 30 tablet 0  "    metFORMIN (GLUCOPHAGE) 500 MG tablet Take 1 tablet (500 mg) by mouth daily (with dinner) 90 tablet 3     metoprolol succinate (TOPROL-XL) 50 MG 24 hr tablet TAKE 1 TABLET BY MOUTH ONCE DAILY 90 tablet 1     Multiple Vitamin (MULTI-VITAMIN PO) Take 1 tablet by mouth daily        omega-3 fatty acids (FISH OIL) 1200 MG capsule Take 1 capsule by mouth daily.       oxyCODONE IR (ROXICODONE) 15 MG tablet Take 0.5-1 tablets (7.5-15 mg) by mouth every 4 hours as needed for moderate to severe pain Limit 4 tablet(s) per day. 20 tablet 0     polyethylene glycol (MIRALAX/GLYCOLAX) packet Take 17 g by mouth daily       polyethylene glycol (MIRALAX/GLYCOLAX) packet Take 1 packet by mouth 2 times daily as needed for constipation       POTASSIUM CHLORIDE ER PO Take 20 mEq by mouth daily       senna-docusate (SENOKOT-S/PERICOLACE) 8.6-50 MG tablet Take 3 tablets by mouth 2 times daily 180 tablet 0       REVIEW OF SYSTEMS:  6 point ROS of systems including Respiratory, Cardiovascular, Gastroenterology, Genitourinary, Integumentary, Musculoskeletal were all negative except for pertinent positives noted in my HPI.    Objective:  /78   Pulse 91   Temp 99.2  F (37.3  C)   Resp 18   Ht 1.702 m (5' 7\")   Wt 112.3 kg (247 lb 9.6 oz)   LMP 12/11/2011   SpO2 94%   BMI 38.78 kg/m    Exam:  GENERAL APPEARANCE:  Alert, pleasant and cooperative, lying in bed on exam today  EYES:  EOM, lids, pupils and irises normal, sclera clear and conjunctiva normal, no discharge or mattering on lids or lashes noted  ENT:  Mouth normal, moist mucous membranes, nose without drainage or crusting, external ears without lesions, hearing acuity intact  RESP:  respiratory effort normal, no respiratory distress, Lung sounds clear, patient is on RA  CV: auscultation of heart done, rate and rhythm regular. +1 pitting edema to BLE, right >left  ABDOMEN:  normal bowel sounds, soft, nontender, no grimacing or guarding with palpation.  M/S:   Wheelchair " bound at baseline; Digits and nails normal, no tenderness or swelling of the joints; very weak to bilateral lower extremities- not able to move RLE against gravity, able to move LLE minimally  SKIN:  Inspection and palpation of skin and subcutaneous tissue: skin warm, dry without rashes, bilateral lower extremities with darker pigmented skin and some redness- she reports this is her baseline, buttock/sacral area with large open area (larger than a quarter) on left side 100% slough, right side with pencil sized open area-new since last week)   NEURO: no facial asymmetry, no speech deficits and able to follow directions  PSYCH: insight and judgement intact, memory intact, affect and mood normal    Labs:   Labs done in SNF are in [place] EPIC. Please refer to them using [employer] Everywhere.    ASSESSMENT/PLAN:  (A41.9,  R65.21) Septic shock (H)  (primary encounter diagnosis)  (N20.1) Infected Left ureteral stone  Comment: Acute, improving. WBC normal (4/1 8.3)  -UC + VRE and pseduomonas  -Completed daptomycin prior to hospital discharge  -ureteral stent removed by urology yesterday  Plan: Completed cipro 3/31. Continue to encourage fluids. Follow up with urology in 1 year with KUB to ensure she is not forming any new stones.      (N17.9) Acute renal failure, unspecified acute renal failure type (H)  Comment: acute, related to sepsis, improved prior to hospital discharge-remains at baseline  3/29 creatinine 0.81  Creatinine   Date Value Ref Range Status   03/22/2019 0.63 0.52 - 1.04 mg/dL Final     Plan: BMP PRN. Monitor     (R73.03) Borderline type 2 diabetes mellitus  Comment: Chronic, BS meeting goal            Lab Results   Component Value Date     A1C 6.6 02/09/2019     A1C 6.4 01/30/2019     A1C 6.8 06/20/2018     A1C 6.5 10/02/2017     A1C 6.6 05/02/2016      Plan: Continue metformin.. BS M, Th AM fasting and at bedtime. Carb consistent diet.     (G35) MS (multiple sclerosis) (H)  Comment: Chronic - w/c bound  at baseline. Resides at home with  - is mostly independent at home with pivot transferring. - is now requiring Ez stand due to sacral/buttock ulcer  Plan: Therapies as below.      (G89.4) Chronic pain syndrome  Comment: Chronic- pain stable at baseline (6/10)  Plan: Continue Amitriptyline, Baclofen, Neurontin, Oxycodone, and Cymbalta.   .   (I10) Hypertension goal BP (blood pressure) < 140/90  Comment: chronic, generally meeting goal BP: 126-156/65-79 mmHg   Plan: Based on JNC-8 goals,  patients age of 53 year old, presence of diabetes or CKD, and goals of care goal BP is <150/90 mm Hg. Continue Metoprolol, Losartan, Lasix and hydrochlorothiazide. VS per TCU policy. BMP PRN     (D64.9) Anemia, unspecified type  Comment: Chronic, hgb at baseline  4/1 hgb 10.2  -Baseline hgb 9-10  Plan: CBC PRN. Monitor for s.sx of bleeding     (L89.309) Pressure injury of skin of buttock  Comment: Acute, located across both buttock/sacral areas- with open areas noted now on both sides  Plan: Per [facility] nurse. Dressing changed today per [facility]OC nurse at TCU with plans to start santyl to help with debridement of slough. Air mattress to bed. Reposition q2h. Dietician to follow to optimize diet     (S72.001G) Closed fracture of neck of right femur with delayed healing, subsequent encounter  Comment: Noted during recent hospitalization  CT from 1/30/19 showed Mildly displaced, impacted right femoral neck fracture, age  indeterminate, but could be recent.  Plan: Pain control as above. Monitor. Therapy as below     (R53.81) Physical deconditioning  Comment: Acute, related to recent hospitalization, illnesses as above  Plan: Encourage participation in physical therapy/occupational therapy for strengthening and deconditioning. Discharge planning per their recommendation. Social work to assist with d/c planning.     Electronically signed by:  BAYLEE Mg CNP

## 2019-04-04 NOTE — LETTER
4/4/2019        RE: Amanda Richardson  [address] [place] [address]        [place] [facility]  [place] [facility] Record Number:  [id number]  Place of Service where encounter took place:  [facility] ([facility]) [[id number]]  Chief Complaint   Patient presents with     RECHECK       HPI:    Amanda Richardson  is a 53 year old (1965), who is being seen today for an episodic care visit.  HPI information obtained from: facility chart records, facility staff, patient report and [facility] chart review. Today's concern is:  1. Septic shock (H)    2. Infected Left ureteral stone    3. Acute renal failure, unspecified acute renal failure type (H)    4. MS (multiple sclerosis) (H)    5. Chronic pain syndrome    6. Essential hypertension    7. Borderline type 2 diabetes mellitus    8. Anemia, unspecified type    9. Pressure injury of skin of buttock, unspecified injury stage, unspecified laterality    10. Closed fracture of neck of right femur with delayed healing, subsequent encounter    11. Physical deconditioning      Ms Richardson was seen today for follow up visit. Ms. Richardson is a 53 year old female that was recently in the hospital with septic shock secondary to infected left ureteral stone.     Nursing reported yesterday that she had increased bilateral lower extremity edema with right leg > left. US was obtained of RLE and was negative for blood clot. Today she reports that her edema is worse than at baseline. She has been sitting in her wheelchair frequently with her legs dangling.    Her pressure ulcer to her buttock/sacral area assessed today by [facility] nurse. With new open area noted today on right sacrum area about the size of pencil. Left sacral area 100% slough.     She is requiring ez stand for transfers, was using pivot slide board previously, but unable now due to pressure ulcer.     Reports bowels are moving well.    States she has a cold- no SOB, cough- with  rhinitis, congestion, sore throat. Started yesterday.    Reports chronic pain is about 6/10 at all times, occasionally worse. Baclofen pump refilled since last visit and her dose was increased. She has not noticed much of a change in her pain control since this adjustment.       Data collected from [facility] TCU Matrix System  (in italics):  BP: 126-156/65-79 mmHg   P: 84-96 bpm   Blood Sugar:   04/01/2019 21:02   Blood Sugar: 147 mg/dL  04/01/2019 09:40   Blood Sugar: 121 mg/dL    Wt Readings from Last 4 Encounters:   04/04/19 112.3 kg (247 lb 9.6 oz)   03/31/19 112.5 kg (248 lb 1.6 oz)   03/27/19 112.9 kg (249 lb)   03/27/19 113.4 kg (250 lb)         Past Medical and Surgical History reviewed in Epic today.    MEDICATIONS:  Current Outpatient Medications   Medication Sig Dispense Refill     amitriptyline (ELAVIL) 100 MG tablet Take 1 tablet (100 mg) by mouth At Bedtime 90 tablet 3     atorvastatin (LIPITOR) 10 MG tablet Take 1 tablet (10 mg) by mouth daily 90 tablet 3     baclofen (LIORESAL) 10 MG tablet Take 10 mg by mouth 3 times daily       baclofen (LIORESAL) 10 MG tablet Take 10 mg by mouth daily as needed for muscle spasms       baclofen (LIORESAL) intraTHECAL Internal Pump by Intrathecal route continuous prn 3/18/19 - Managed by [facility], spoke to Jolene at [phone]  Pump contains baclofen 2000mcg/ml  Alarm date: 3/29/19  Dose-413mcg/day  Reservoir contains 2ml after the alarm date.       bisacodyl (DULCOLAX) 10 MG suppository Place 1 suppository (10 mg) rectally daily as needed for constipation       cholecalciferol 1000 units TABS Take 1,000 Units by mouth daily       docusate sodium (COLACE) 100 MG capsule Take 100 mg by mouth 2 times daily as needed for constipation       furosemide (LASIX) 40 MG tablet Take 40 mg by mouth daily       gabapentin (NEURONTIN) 300 MG capsule Take 600 mg by mouth 3 times daily       hydrochlorothiazide (MICROZIDE) 12.5 MG capsule Take 12.5 mg by  "mouth daily       losartan (COZAAR) 50 MG tablet Take 1 tablet (50 mg) by mouth daily 30 tablet 0     metFORMIN (GLUCOPHAGE) 500 MG tablet Take 1 tablet (500 mg) by mouth daily (with dinner) 90 tablet 3     metoprolol succinate (TOPROL-XL) 50 MG 24 hr tablet TAKE 1 TABLET BY MOUTH ONCE DAILY 90 tablet 1     Multiple Vitamin (MULTI-VITAMIN PO) Take 1 tablet by mouth daily        omega-3 fatty acids (FISH OIL) 1200 MG capsule Take 1 capsule by mouth daily.       oxyCODONE IR (ROXICODONE) 15 MG tablet Take 0.5-1 tablets (7.5-15 mg) by mouth every 4 hours as needed for moderate to severe pain Limit 4 tablet(s) per day. 20 tablet 0     polyethylene glycol (MIRALAX/GLYCOLAX) packet Take 17 g by mouth daily       polyethylene glycol (MIRALAX/GLYCOLAX) packet Take 1 packet by mouth 2 times daily as needed for constipation       POTASSIUM CHLORIDE ER PO Take 20 mEq by mouth daily       senna-docusate (SENOKOT-S/PERICOLACE) 8.6-50 MG tablet Take 3 tablets by mouth 2 times daily 180 tablet 0       REVIEW OF SYSTEMS:  6 point ROS of systems including Respiratory, Cardiovascular, Gastroenterology, Genitourinary, Integumentary, Musculoskeletal were all negative except for pertinent positives noted in my HPI.    Objective:  /78   Pulse 91   Temp 99.2  F (37.3  C)   Resp 18   Ht 1.702 m (5' 7\")   Wt 112.3 kg (247 lb 9.6 oz)   LMP 12/11/2011   SpO2 94%   BMI 38.78 kg/m     Exam:  GENERAL APPEARANCE:  Alert, pleasant and cooperative, lying in bed on exam today  EYES:  EOM, lids, pupils and irises normal, sclera clear and conjunctiva normal, no discharge or mattering on lids or lashes noted  ENT:  Mouth normal, moist mucous membranes, nose without drainage or crusting, external ears without lesions, hearing acuity intact  RESP:  respiratory effort normal, no respiratory distress, Lung sounds clear, patient is on RA  CV: auscultation of heart done, rate and rhythm regular.  +1 pitting edema to BLE, right >left  ABDOMEN: "  normal bowel sounds, soft, nontender, no grimacing or guarding with palpation.  M/S:   Wheelchair bound at baseline; Digits and nails normal, no tenderness or swelling of the joints; very weak to bilateral lower extremities- not able to move RLE against gravity, able to move LLE minimally  SKIN:  Inspection and palpation of skin and subcutaneous tissue: skin warm, dry without rashes, bilateral lower extremities with darker pigmented skin and some redness- she reports this is her baseline, buttock/sacral area with large open area (larger than a quarter) on left side 100% slough, right side with pencil sized open area-new since last week)   NEURO: no facial asymmetry, no speech deficits and able to follow directions  PSYCH: insight and judgement intact, memory intact, affect and mood normal    Labs:   Labs done in SNF are in [place] EPIC. Please refer to them using [employer]/Care Everywhere.    ASSESSMENT/PLAN:  (A41.9,  R65.21) Septic shock (H)  (primary encounter diagnosis)  (N20.1) Infected Left ureteral stone  Comment: Acute, improving. WBC normal (4/1 8.3)  -UC + VRE and pseduomonas  -Completed daptomycin prior to hospital discharge  -ureteral stent removed by urology yesterday  Plan: C ompleted cipro 3/31. Continue to encourage fluids. Follow up with urology in 1 year with KUB to ensure she is not forming any new stones.      (N17.9) Acute renal failure, unspecified acute renal failure type (H)  Comment: acute, related to sepsis, improved prior to hospital discharge-remains at baseline  3/29 creatinine 0.81  Creatinine   Date Value Ref Range Status   03/22/2019 0.63 0.52 - 1.04 mg/dL Final     Plan: BMP  PRN. Monitor     (R73.03) Borderline type 2 diabetes mellitus  Comment: Chronic, BS meeting goal            Lab Results   Component Value Date     A1C 6.6 02/09/2019     A1C 6.4 01/30/2019     A1C 6.8 06/20/2018     A1C 6.5 10/02/2017     A1C 6.6 05/02/2016      Plan: Continue metformin.. BS M, Th AM fasting and at  bedtime. Carb consistent diet.     (G35) MS (multiple sclerosis) (H)  Comment: Chronic - w/c bound at baseline. Resides at home with  - is mostly independent at home with pivot transferring. - is now requiring Ez stand due to sacral/buttock ulcer  Plan: Therapies as below.      (G89.4) Chronic pain syndrome  Comment: Chronic- pain stable at baseline (6/10)  Plan: Continue Amitriptyline, Baclofen, Neurontin, Oxycodone, and Cymbalta.   .   (I10) Hypertension goal BP (blood pressure) < 140/90  Comment: chronic,  generally meeting goal BP: 126-156/65-79 mmHg   Plan: Based on JNC-8 goals,  patients age of 53 year old, presence of diabetes or CKD, and goals of care goal BP is <150/90 mm Hg. Continue Metoprolol, Losartan, Lasix and hydrochlorothiazide. VS per TCU policy. BMP PRN     (D64.9) Anemia, unspecified type  Comment: Chronic, hgb at baseline  4/1 hgb 10.2  -Baseline hgb 9-10  Plan: CBC PRN. Monitor for s.sx of bleeding     (L89.309) Pressure injury of skin of buttock  Comment: Acute, located across both buttock/sacral areas- with open areas noted now on both sides  Plan: Per WOC nurse. Dressing changed today per WOC nurse at TCU with plans to start santyl to help with debridement of slough. Air mattress to bed. Reposition q2h. Dietician to follow to optimize diet     (S72.001G) Closed fracture of neck of right femur with delayed healing, subsequent encounter  Comment: Noted during recent hospitalization  CT from 1/30/19 showed Mildly displaced, impacted right femoral neck fracture, age  indeterminate, but could be recent.  Plan: Pain control as above. Monitor. Therapy as below     (R53.81) Physical deconditioning  Comment: Acute, related to recent hospitalization, illnesses as above  Plan: Encourage participation in physical therapy/occupational therapy for strengthening and deconditioning. Discharge planning per their recommendation. Social work to assist with d/c planning.     Electronically signed  by:  BAYLEE Mg CNP            Sincerely,        Margaret Neal, BAYLEE CNP

## 2019-04-07 ENCOUNTER — TELEPHONE (OUTPATIENT)
Dept: GERIATRICS | Facility: CLINIC | Age: 54
End: 2019-04-07

## 2019-04-07 ENCOUNTER — TRANSFERRED RECORDS (OUTPATIENT)
Dept: HEALTH INFORMATION MANAGEMENT | Facility: CLINIC | Age: 54
End: 2019-04-07

## 2019-04-08 NOTE — TELEPHONE ENCOUNTER
"Paged re: update on CXR taken today. Report is \"no acute cardiopulmonary disease\". Symptomatically Amanda has dry cough and runny nose. She was given house orders for Guaifenesin. Temp 99.0, O2 92% on 1 LPM. Per nurse, she is not normally on oxygen so would have primary team follow up closely on this patient to be sure she is not decompensating. Sounds like though she has poor functional status and is on opiates which may be reducing her respiratory drive. She also has MS and h/o tobacco use.  "

## 2019-04-09 ENCOUNTER — RECORDS - HEALTHEAST (OUTPATIENT)
Dept: LAB | Facility: CLINIC | Age: 54
End: 2019-04-09

## 2019-04-09 ENCOUNTER — NURSING HOME VISIT (OUTPATIENT)
Dept: GERIATRICS | Facility: CLINIC | Age: 54
End: 2019-04-09
Payer: COMMERCIAL

## 2019-04-09 VITALS
DIASTOLIC BLOOD PRESSURE: 76 MMHG | WEIGHT: 253 LBS | HEIGHT: 67 IN | HEART RATE: 91 BPM | BODY MASS INDEX: 39.71 KG/M2 | RESPIRATION RATE: 20 BRPM | TEMPERATURE: 99.9 F | SYSTOLIC BLOOD PRESSURE: 147 MMHG | OXYGEN SATURATION: 90 %

## 2019-04-09 DIAGNOSIS — M48.061 SPINAL STENOSIS OF LUMBAR REGION, UNSPECIFIED WHETHER NEUROGENIC CLAUDICATION PRESENT: ICD-10-CM

## 2019-04-09 DIAGNOSIS — G35 MS (MULTIPLE SCLEROSIS) (H): ICD-10-CM

## 2019-04-09 DIAGNOSIS — G89.4 CHRONIC PAIN SYNDROME: ICD-10-CM

## 2019-04-09 DIAGNOSIS — N20.1 LEFT URETERAL STONE: ICD-10-CM

## 2019-04-09 DIAGNOSIS — R53.81 PHYSICAL DECONDITIONING: ICD-10-CM

## 2019-04-09 DIAGNOSIS — I10 ESSENTIAL HYPERTENSION: ICD-10-CM

## 2019-04-09 DIAGNOSIS — R05.9 COUGH: Primary | ICD-10-CM

## 2019-04-09 DIAGNOSIS — M54.16 LUMBAR RADICULOPATHY: ICD-10-CM

## 2019-04-09 DIAGNOSIS — L89.309 PRESSURE INJURY OF SKIN OF BUTTOCK, UNSPECIFIED INJURY STAGE, UNSPECIFIED LATERALITY: ICD-10-CM

## 2019-04-09 PROCEDURE — 99309 SBSQ NF CARE MODERATE MDM 30: CPT | Performed by: NURSE PRACTITIONER

## 2019-04-09 RX ORDER — GUAIFENESIN 600 MG/1
600 TABLET, EXTENDED RELEASE ORAL 2 TIMES DAILY
Start: 2019-04-09 | End: 2019-06-10

## 2019-04-09 RX ORDER — IPRATROPIUM BROMIDE AND ALBUTEROL SULFATE 2.5; .5 MG/3ML; MG/3ML
1 SOLUTION RESPIRATORY (INHALATION) 4 TIMES DAILY
Start: 2019-04-09 | End: 2019-06-10

## 2019-04-09 RX ORDER — OXYCODONE HYDROCHLORIDE 15 MG/1
7.5-15 TABLET ORAL EVERY 4 HOURS PRN
Qty: 20 TABLET | Refills: 0 | Status: SHIPPED | OUTPATIENT
Start: 2019-04-09 | End: 2019-04-10

## 2019-04-09 ASSESSMENT — MIFFLIN-ST. JEOR: SCORE: 1775.23

## 2019-04-09 NOTE — PROGRESS NOTES
[place] [facility]  [place] [facility] Record Number:  [id number]  Place of Service where encounter took place:  [facility] ([facility]) [[id number]]  Chief Complaint   Patient presents with     RECHECK       HPI:    Amanda Richardson  is a 53 year old (1965), who is being seen today for an episodic care visit.  HPI information obtained from: facility chart records, facility staff, patient report and [facility] chart review. Today's concern is:  1. Cough    2. Infected Left ureteral stone    3. MS (multiple sclerosis) (H)    4. Chronic pain syndrome    5. Essential hypertension    6. Pressure injury of skin of buttock, unspecified injury stage, unspecified laterality    7. Physical deconditioning      Ms. Richardson seen today for follow up visit at TCU. Ms. Richardson is a 53 year old female that was recently in the hospital with septic shock secondary to infected left ureteral stone.     Since she was last seen on call was contacted and chest xray was ordered due to cough, low grade fevers, occasional desaturation of oxygen under 90%. Chest xray was negative. She reports today that she feels like she is turning a corner. Last temperature documented byr nursing was 99.9 on 4/8. Her oxygen saturations this morning dipped into 88-89% on RA- per nurse she educated patient on deep breathing and coughing and then they came up above 90%. She continues to feel congested and has productive cough. Per nursing, several other residents that she eats meals with at the TCU have had similar symptoms.     She feels her chronic pain is worse than normal right now due to her cold. She also is not moving as much as she normally does due to the cold.     She reports her bowels are moving well.         Data collected from [facility] TCU Matrix System  (in italics):  BP: 119-150/57-78 mmHg   P:  bpm   Blood Sugar:   Time  Mon  1 Thu  4 Fri 5 Mon  8          07:00   Blood Sugar 121 mg/dL 115 mg/dL X  118 mg/dL   08:00   Blood Sugar 147 mg/dL X 145 mg/dL 156 mg/dL     Wt Readings from Last 4 Encounters:   04/09/19 114.8 kg (253 lb)   04/04/19 112.3 kg (247 lb 9.6 oz)   03/31/19 112.5 kg (248 lb 1.6 oz)   03/27/19 112.9 kg (249 lb)       Past Medical and Surgical History reviewed in Epic today.    MEDICATIONS:  Current Outpatient Medications   Medication Sig Dispense Refill     amitriptyline (ELAVIL) 100 MG tablet Take 1 tablet (100 mg) by mouth At Bedtime 90 tablet 3     atorvastatin (LIPITOR) 10 MG tablet Take 1 tablet (10 mg) by mouth daily 90 tablet 3     baclofen (LIORESAL) 10 MG tablet Take 10 mg by mouth 3 times daily       baclofen (LIORESAL) 10 MG tablet Take 10 mg by mouth daily as needed for muscle spasms       baclofen (LIORESAL) intraTHECAL Internal Pump by Intrathecal route continuous prn 3/18/19 - Managed by [facility], spoke to Jolene at [phone]  Pump contains baclofen 2000mcg/ml  Alarm date: 3/29/19  Dose-413mcg/day  Reservoir contains 2ml after the alarm date.       bisacodyl (DULCOLAX) 10 MG suppository Place 1 suppository (10 mg) rectally daily as needed for constipation       cholecalciferol 1000 units TABS Take 1,000 Units by mouth daily       docusate sodium (COLACE) 100 MG capsule Take 100 mg by mouth 2 times daily as needed for constipation       furosemide (LASIX) 40 MG tablet Take 40 mg by mouth daily       gabapentin (NEURONTIN) 300 MG capsule Take 600 mg by mouth 3 times daily       hydrochlorothiazide (MICROZIDE) 12.5 MG capsule Take 12.5 mg by mouth daily       losartan (COZAAR) 50 MG tablet Take 1 tablet (50 mg) by mouth daily 30 tablet 0     metFORMIN (GLUCOPHAGE) 500 MG tablet Take 1 tablet (500 mg) by mouth daily (with dinner) 90 tablet 3     metoprolol succinate (TOPROL-XL) 50 MG 24 hr tablet TAKE 1 TABLET BY MOUTH ONCE DAILY 90 tablet 1     Multiple Vitamin (MULTI-VITAMIN PO) Take 1 tablet by mouth daily        omega-3 fatty acids (FISH OIL) 1200 MG  "capsule Take 1 capsule by mouth daily.       oxyCODONE IR (ROXICODONE) 15 MG tablet Take 0.5-1 tablets (7.5-15 mg) by mouth every 4 hours as needed for moderate to severe pain Limit 4 tablet(s) per day. 20 tablet 0     polyethylene glycol (MIRALAX/GLYCOLAX) packet Take 17 g by mouth daily       polyethylene glycol (MIRALAX/GLYCOLAX) packet Take 1 packet by mouth 2 times daily as needed for constipation       POTASSIUM CHLORIDE ER PO Take 20 mEq by mouth daily       senna-docusate (SENOKOT-S/PERICOLACE) 8.6-50 MG tablet Take 3 tablets by mouth 2 times daily 180 tablet 0         REVIEW OF SYSTEMS:  6 point ROS of systems including Constitutional, Respiratory, Cardiovascular, Gastroenterology, Genitourinary, Musculoskeletal were all negative except for pertinent positives noted in my HPI.    Objective:  /76   Pulse 91   Temp 99.9  F (37.7  C)   Resp 20   Ht 1.702 m (5' 7\")   Wt 114.8 kg (253 lb)   LMP 12/11/2011   SpO2 90%   BMI 39.63 kg/m    Exam:  GENERAL APPEARANCE:  Alert, pleasant and cooperative, sitting in wheelchair on exam today  ENT:  dry mucous membranes,hearing acuity intact  RESP:  respiratory effort normal, no respiratory distress, Lung sounds with crackles bilaterally and some wheezing present, patient is on RA  CV: auscultation of heart done, rate and rhythm regular. legs wrapped today  ABDOMEN:  + bowel sounds, soft, nontender, no grimacing or guarding with palpation.  M/S:   Wheelchair bound at baseline  NEURO: no facial asymmetry, no speech deficits and able to follow directions  PSYCH: insight and judgement intact, memory intact, affect and mood normal       Labs:   Labs done in SNF are in [place] EPIC. Please refer to them using EPIC/Care Everywhere.     Imaging:  Chest xray 4/7: No evidence of acute pulmonary disease    ASSESSMENT/PLAN:  (R05) Cough  Comment: Acute-suspect viral- chest xray negative for pneumonia- very tired today  Plan: Duonebs QID. Mucinex BID. Oxygen saturations " q4h. Titrate oxygen to keep oxygen saturations >90%. CBC, BMP 4/10    (N20.1) Infected Left ureteral stone  Comment: Acute, improved  -UC + VRE and pseduomonas  -Completed daptomycin prior to hospital discharge  -ureteral stent removed by urology yesterday  Plan: Completed cipro 3/31. Continue to encourage fluids. Follow up with urology in 1 year with KUB to ensure she is not forming any new stones.      (G35) MS (multiple sclerosis) (H)  Comment: Chronic - w/c bound at baseline. Resides at home with  - is mostly independent at home with pivot transferring. - is now requiring Ez stand due to sacral/buttock ulcer -reports pain is worse currently due to cold  Plan: Pain management as below. Therapies as below.      (G89.4) Chronic pain syndrome  Comment: Chronic- pain worse than normal currently due to cold  Plan: Continue Amitriptyline, Baclofen, Neurontin, Oxycodone, and Cymbalta.   .   (I10) Hypertension goal BP (blood pressure) < 140/90  Comment: chronic, generally meeting goal- with 2 BPs over past week above goal  Plan: Based on JNC-8 goals,  patients age of 53 year old, presence of diabetes or CKD, and goals of care goal BP is <140/90 mm Hg. Continue Metoprolol, Losartan, Lasix and hydrochlorothiazide. VS per TCU policy. BMP PRN.     (L89.309) Pressure injury of skin of buttock  Comment: Acute, located across both buttock/sacral areas- unable to visualize today due to patient in wheelchair   Plan: Per WOC nurse. Air mattress to bed. Reposition q2h. Dietician to follow to optimize diet     (R53.81) Physical deconditioning  Comment: Acute, related to recent hospitalization, illnesses as above  Plan: Encourage participation in physical therapy/occupational therapy for strengthening and deconditioning. Discharge planning per their recommendation. Social work to assist with d/c planning.    Chronic issues:  (R73.03) Borderline type 2 diabetes mellitus  Comment: Chronic, BS meeting goal            Lab Results    Component Value Date     A1C 6.6 02/09/2019     A1C 6.4 01/30/2019     A1C 6.8 06/20/2018     A1C 6.5 10/02/2017     A1C 6.6 05/02/2016      Plan: Continue metformin. BS M, Th AM fasting and at bedtime. Carb consistent diet.    (D64.9) Anemia, unspecified type  Comment: Chronic, hgb at baseline  4/1 hgb 10.2  -Baseline hgb 9-10  Plan: CBC PRN. Monitor for s.sx of bleeding    (S72.001G) Closed fracture of neck of right femur with delayed healing, subsequent encounter  Comment: Noted during recent hospitalization  CT from 1/30/19 showed Mildly displaced, impacted right femoral neck fracture, age  indeterminate, but could be recent.  Plan: Pain control as above. Monitor. Therapy as below     Electronically signed by:  BAYLEE Mg CNP

## 2019-04-09 NOTE — LETTER
4/9/2019        RE: Amanda Richardson  [address] [place] [address]        [facility]  [place] Medical Record Number:  [id number]  Place of Service where encounter took place:  [facility] ([facility]) [[id number]]  Chief Complaint   Patient presents with     RECHECK       HPI:    Amanda Richardson  is a 53 year old (1965), who is being seen today for an episodic care visit.  HPI information obtained from: facility chart records, facility staff, patient report and [facility] chart review. Today's concern is:  1. Cough    2. Infected Left ureteral stone    3. MS (multiple sclerosis) (H)    4. Chronic pain syndrome    5. Essential hypertension    6. Pressure injury of skin of buttock, unspecified injury stage, unspecified laterality    7. Physical deconditioning      Ms. Richardson seen today for follow up visit at TCU. Ms. Richardson is a 53 year old female that was recently in the hospital with septic shock secondary to infected left ureteral stone.     Since she was last seen on call was contacted and chest xray was ordered due to cough, low grade fevers, occasional desaturation of oxygen under 90%. Chest xray was negative. She reports today that she feels like she is turning a corner. Last temperature documented byr nursing was 99.9 on 4/8. Her oxygen saturations this morning dipped into 88-89% on RA- per nurse she educated patient on deep breathing and coughing and then they came up above 90%. She continues to feel congested and has productive cough. Per nursing, several other residents that she eats meals with at the TCU have had similar symptoms.     She feels her chronic pain is worse than normal right now due to her cold. She also is not moving as much as she normally does due to the cold.     She reports her bowels are moving well.         Data collected from [facility] TCU Matrix System  (in italics):  BP: 119-150/57-78 mmHg   P:  bpm   Blood  Sugar:   Time  Mon 1 Thu  4 Fri 5 Mon 8          07:00   Blood Sugar 121 mg/dL 115 mg/dL X 118 mg/dL   08:00   Blood Sugar 147 mg/dL X 145 mg/dL 156 mg/dL     Wt Readings from Last 4 Encounters:   04/09/19 114.8 kg (253 lb)   04/04/19 112.3 kg (247 lb 9.6 oz)   03/31/19 112.5 kg (248 lb 1.6 oz)   03/27/19 112.9 kg (249 lb)       Past Medical and Surgical History reviewed in Epic today.    MEDICATIONS:  Current Outpatient Medications   Medication Sig Dispense Refill     amitriptyline (ELAVIL) 100 MG tablet Take 1 tablet (100 mg) by mouth At Bedtime 90 tablet 3     atorvastatin (LIPITOR) 10 MG tablet Take 1 tablet (10 mg) by mouth daily 90 tablet 3     baclofen (LIORESAL) 10 MG tablet Take 10 mg by mouth 3 times daily       baclofen (LIORESAL) 10 MG tablet Take 10 mg by mouth daily as needed for muscle spasms       baclofen (LIORESAL) intraTHECAL Internal Pump by Intrathecal route continuous prn 3/18/19 - Managed by [facility], spoke to Jolene at [phone]  Pump contains baclofen 2000mcg/ml  Alarm date: 3/29/19  Dose-413mcg/day  Reservoir contains 2ml after the alarm date.       bisacodyl (DULCOLAX) 10 MG suppository Place 1 suppository (10 mg) rectally daily as needed for constipation       cholecalciferol 1000 units TABS Take 1,000 Units by mouth daily       docusate sodium (COLACE) 100 MG capsule Take 100 mg by mouth 2 times daily as needed for constipation       furosemide (LASIX) 40 MG tablet Take 40 mg by mouth daily       gabapentin (NEURONTIN) 300 MG capsule Take 600 mg by mouth 3 times daily       hydrochlorothiazide (MICROZIDE) 12.5 MG capsule Take 12.5 mg by mouth daily       losartan (COZAAR) 50 MG tablet Take 1 tablet (50 mg) by mouth daily 30 tablet 0     metFORMIN (GLUCOPHAGE) 500 MG tablet Take 1 tablet (500 mg) by mouth daily (with dinner) 90 tablet 3     metoprolol succinate (TOPROL-XL) 50 MG 24 hr tablet TAKE 1 TABLET BY MOUTH ONCE DAILY 90 tablet 1     Multiple Vitamin  "(MULTI-VITAMIN PO) Take 1 tablet by mouth daily        omega-3 fatty acids (FISH OIL) 1200 MG capsule Take 1 capsule by mouth daily.       oxyCODONE IR (ROXICODONE) 15 MG tablet Take 0.5-1 tablets (7.5-15 mg) by mouth every 4 hours as needed for moderate to severe pain Limit 4 tablet(s) per day. 20 tablet 0     polyethylene glycol (MIRALAX/GLYCOLAX) packet Take 17 g by mouth daily       polyethylene glycol (MIRALAX/GLYCOLAX) packet Take 1 packet by mouth 2 times daily as needed for constipation       POTASSIUM CHLORIDE ER PO Take 20 mEq by mouth daily       senna-docusate (SENOKOT-S/PERICOLACE) 8.6-50 MG tablet Take 3 tablets by mouth 2 times daily 180 tablet 0         REVIEW OF SYSTEMS:  6 point ROS of systems including Constitutional, Respiratory, Cardiovascular, Gastroenterology, Genitourinary, Musculoskeletal were all negative except for pertinent positives noted in my HPI.    Objective:  /76   Pulse 91   Temp 99.9  F (37.7  C)   Resp 20   Ht 1.702 m (5' 7\")   Wt 114.8 kg (253 lb)   LMP 12/11/2011   SpO2 90%   BMI 39.63 kg/m     Exam:  GENERAL APPEARANCE:  Alert, pleasant and cooperative, sitting in wheelchair on exam today  ENT:  dry mucous membranes,hearing acuity intact  RESP:  respiratory effort normal, no respiratory distress, Lung sounds with crackles bilaterally and some wheezing present, patient is on RA  CV: auscultation of heart done, rate and rhythm regular. legs wrapped today  ABDOMEN:  + bowel sounds, soft, nontender, no grimacing or guarding with palpation.  M/S:   Wheelchair bound at baseline  NEURO: no facial asymmetry, no speech deficits and able to follow directions  PSYCH: insight and judgement intact, memory intact, affect and mood normal       Labs:   Labs done in SNF are in [place] EPIC. Please refer to them using [employer]/Care Everywhere.     Imaging:  Chest xray 4/7: No evidence of acute pulmonary disease    ASSESSMENT/PLAN:  (R05) Cough  Comment: Acute-suspect viral- chest " xray negative for pneumonia- very tired today  Plan: Duonebs QID. Mucinex BID. Oxygen saturations q4h. Titrate oxygen to keep oxygen saturations >90%. CBC, BMP 4/10    (N20.1) Infected Left ureteral stone  Comment: Acute, improved  -UC + VRE and pseduomonas  -Completed daptomycin prior to hospital discharge  -ureteral stent removed by urology yesterday  Plan: Completed cipro 3/31. Continue to encourage fluids. Follow up with urology in 1 year with KUB to ensure she is not forming any new stones.      (G35) MS (multiple sclerosis) (H)  Comment: Chronic - w/c bound at baseline. Resides at home with  - is mostly independent at home with pivot transferring. - is now requiring Ez stand due to sacral/buttock ulcer -reports pain is worse currently due to cold  Plan: Pain management as below. Therapies as below.      (G89.4) Chronic pain syndrome  Comment: Chronic- pain worse than normal currently due to cold  Plan: Continue Amitriptyline, Baclofen, Neurontin, Oxycodone, and Cymbalta.   .   (I10) Hypertension goal BP (blood pressure) < 140/90  Comment: chronic, generally meeting goal- with 2 BPs over past week above goal  Plan: Based on JNC-8 goals,  patients age of 53 year old, presence of diabetes or CKD, and goals of care goal BP is <140/90 mm Hg. Continue Metoprolol, Losartan, Lasix and hydrochlorothiazide. VS per TCU policy. BMP PRN.     (L89.309) Pressure injury of skin of buttock  Comment: Acute, located across both buttock/sacral areas- unable to visualize today due to patient in wheelchair   Plan: Per WOC nurse. Air mattress to bed. Reposition q2h. Dietician to follow to optimize diet     (R53.81) Physical deconditioning  Comment: Acute, related to recent hospitalization, illnesses as above  Plan: Encourage participation in physical therapy/occupational therapy for strengthening and deconditioning. Discharge planning per their recommendation. Social work to assist with d/c planning.    Chronic  issues:  (R73.03) Borderline type 2 diabetes mellitus  Comment: Chronic, BS meeting goal            Lab Results   Component Value Date     A1C 6.6 02/09/2019     A1C 6.4 01/30/2019     A1C 6.8 06/20/2018     A1C 6.5 10/02/2017     A1C 6.6 05/02/2016      Plan: Continue metformin. BS M, Th AM fasting and at bedtime. Carb consistent diet.    (D64.9) Anemia, unspecified type  Comment: Chronic, hgb at baseline  4/1 hgb 10.2  -Baseline hgb 9-10  Plan: CBC PRN. Monitor for s.sx of bleeding    (S72.001G) Closed fracture of neck of right femur with delayed healing, subsequent encounter  Comment: Noted during recent hospitalization  CT from 1/30/19 showed Mildly displaced, impacted right femoral neck fracture, age  indeterminate, but could be recent.  Plan: Pain control as above. Monitor. Therapy as below     Electronically signed by:  BAYLEE Mg CNP             Sincerely,        BAYLEE Mg CNP

## 2019-04-10 ENCOUNTER — APPOINTMENT (OUTPATIENT)
Dept: GENERAL RADIOLOGY | Facility: CLINIC | Age: 54
DRG: 853 | End: 2019-04-10
Attending: EMERGENCY MEDICINE
Payer: COMMERCIAL

## 2019-04-10 ENCOUNTER — HOSPITAL ENCOUNTER (INPATIENT)
Facility: CLINIC | Age: 54
LOS: 7 days | Discharge: NURSING, REHAB OR LONG-TERM CARE | DRG: 853 | End: 2019-04-17
Attending: EMERGENCY MEDICINE | Admitting: INTERNAL MEDICINE
Payer: COMMERCIAL

## 2019-04-10 ENCOUNTER — NURSING HOME VISIT (OUTPATIENT)
Dept: GERIATRICS | Facility: CLINIC | Age: 54
End: 2019-04-10
Payer: COMMERCIAL

## 2019-04-10 ENCOUNTER — RECORDS - HEALTHEAST (OUTPATIENT)
Dept: LAB | Facility: CLINIC | Age: 54
End: 2019-04-10

## 2019-04-10 VITALS
RESPIRATION RATE: 18 BRPM | OXYGEN SATURATION: 90 % | TEMPERATURE: 100 F | DIASTOLIC BLOOD PRESSURE: 72 MMHG | SYSTOLIC BLOOD PRESSURE: 136 MMHG | HEART RATE: 109 BPM | HEIGHT: 67 IN | BODY MASS INDEX: 39.71 KG/M2 | WEIGHT: 253 LBS

## 2019-04-10 DIAGNOSIS — N39.0 URINARY TRACT INFECTION WITH HEMATURIA, SITE UNSPECIFIED: ICD-10-CM

## 2019-04-10 DIAGNOSIS — R05.9 COUGH: ICD-10-CM

## 2019-04-10 DIAGNOSIS — R31.9 URINARY TRACT INFECTION WITH HEMATURIA, SITE UNSPECIFIED: ICD-10-CM

## 2019-04-10 DIAGNOSIS — L03.119 CELLULITIS OF LOWER EXTREMITY, UNSPECIFIED LATERALITY: ICD-10-CM

## 2019-04-10 DIAGNOSIS — L89.159 PRESSURE INJURY OF SKIN OF SACRAL REGION, UNSPECIFIED INJURY STAGE: Primary | ICD-10-CM

## 2019-04-10 DIAGNOSIS — G89.4 CHRONIC PAIN SYNDROME: Chronic | ICD-10-CM

## 2019-04-10 DIAGNOSIS — M62.81 GENERALIZED MUSCLE WEAKNESS: ICD-10-CM

## 2019-04-10 DIAGNOSIS — A41.9 SEPSIS, DUE TO UNSPECIFIED ORGANISM: ICD-10-CM

## 2019-04-10 DIAGNOSIS — G89.4 CHRONIC PAIN SYNDROME: ICD-10-CM

## 2019-04-10 DIAGNOSIS — J06.9 UPPER RESPIRATORY TRACT INFECTION, UNSPECIFIED TYPE: ICD-10-CM

## 2019-04-10 DIAGNOSIS — G35 MS (MULTIPLE SCLEROSIS) (H): ICD-10-CM

## 2019-04-10 DIAGNOSIS — R50.9 FEVER, UNSPECIFIED FEVER CAUSE: Primary | ICD-10-CM

## 2019-04-10 LAB
ALBUMIN SERPL-MCNC: 2.4 G/DL (ref 3.4–5)
ALBUMIN UR-MCNC: 20 MG/DL
ALBUMIN UR-MCNC: ABNORMAL MG/DL
ALP SERPL-CCNC: 143 U/L (ref 40–150)
ALT SERPL W P-5'-P-CCNC: 22 U/L (ref 0–50)
ANION GAP SERPL CALCULATED.3IONS-SCNC: 1 MMOL/L (ref 3–14)
ANION GAP SERPL CALCULATED.3IONS-SCNC: 12 MMOL/L (ref 5–18)
APPEARANCE UR: ABNORMAL
APPEARANCE UR: CLEAR
AST SERPL W P-5'-P-CCNC: 12 U/L (ref 0–45)
BACTERIA #/AREA URNS HPF: ABNORMAL /HPF
BACTERIA #/AREA URNS HPF: ABNORMAL HPF
BASOPHILS # BLD AUTO: 0.1 10E9/L (ref 0–0.2)
BASOPHILS NFR BLD AUTO: 0.3 %
BILIRUB SERPL-MCNC: 0.3 MG/DL (ref 0.2–1.3)
BILIRUB UR QL STRIP: NEGATIVE
BILIRUB UR QL STRIP: NEGATIVE
BUN SERPL-MCNC: 20 MG/DL (ref 8–22)
BUN SERPL-MCNC: 23 MG/DL (ref 7–30)
CALCIUM SERPL-MCNC: 8.2 MG/DL (ref 8.5–10.1)
CALCIUM SERPL-MCNC: 9.1 MG/DL (ref 8.5–10.5)
CHLORIDE BLD-SCNC: 94 MMOL/L (ref 98–107)
CHLORIDE SERPL-SCNC: 96 MMOL/L (ref 94–109)
CO2 SERPL-SCNC: 31 MMOL/L (ref 22–31)
CO2 SERPL-SCNC: 36 MMOL/L (ref 20–32)
COLOR UR AUTO: ABNORMAL
COLOR UR AUTO: YELLOW
CREAT SERPL-MCNC: 0.77 MG/DL (ref 0.6–1.1)
CREAT SERPL-MCNC: 0.83 MG/DL (ref 0.52–1.04)
DIFFERENTIAL METHOD BLD: ABNORMAL
EOSINOPHIL # BLD AUTO: 0.3 10E9/L (ref 0–0.7)
EOSINOPHIL NFR BLD AUTO: 1.6 %
ERYTHROCYTE [DISTWIDTH] IN BLOOD BY AUTOMATED COUNT: 16.9 % (ref 10–15)
ERYTHROCYTE [DISTWIDTH] IN BLOOD BY AUTOMATED COUNT: 17.2 % (ref 11–14.5)
FLUAV+FLUBV AG SPEC QL: NEGATIVE
FLUAV+FLUBV AG SPEC QL: NEGATIVE
GFR SERPL CREATININE-BSD FRML MDRD: 79 ML/MIN/{1.73_M2}
GFR SERPL CREATININE-BSD FRML MDRD: >60 ML/MIN/1.73M2
GLUCOSE BLD-MCNC: 114 MG/DL (ref 70–125)
GLUCOSE BLDC GLUCOMTR-MCNC: 120 MG/DL (ref 70–99)
GLUCOSE SERPL-MCNC: 118 MG/DL (ref 70–99)
GLUCOSE UR STRIP-MCNC: NEGATIVE MG/DL
GLUCOSE UR STRIP-MCNC: NEGATIVE MG/DL
HCT VFR BLD AUTO: 30.5 % (ref 35–47)
HCT VFR BLD AUTO: 33.8 % (ref 35–47)
HGB BLD-MCNC: 10.2 G/DL (ref 12–16)
HGB BLD-MCNC: 9.5 G/DL (ref 11.7–15.7)
HGB UR QL STRIP: ABNORMAL
HGB UR QL STRIP: ABNORMAL
IMM GRANULOCYTES # BLD: 0.3 10E9/L (ref 0–0.4)
IMM GRANULOCYTES NFR BLD: 1.5 %
KETONES UR STRIP-MCNC: NEGATIVE MG/DL
KETONES UR STRIP-MCNC: NEGATIVE MG/DL
LACTATE BLD-SCNC: 1 MMOL/L (ref 0.7–2)
LEUKOCYTE ESTERASE UR QL STRIP: ABNORMAL
LEUKOCYTE ESTERASE UR QL STRIP: ABNORMAL
LYMPHOCYTES # BLD AUTO: 2.6 10E9/L (ref 0.8–5.3)
LYMPHOCYTES NFR BLD AUTO: 12.6 %
MCH RBC QN AUTO: 25.8 PG (ref 27–34)
MCH RBC QN AUTO: 26.5 PG (ref 26.5–33)
MCHC RBC AUTO-ENTMCNC: 30.2 G/DL (ref 32–36)
MCHC RBC AUTO-ENTMCNC: 31.1 G/DL (ref 31.5–36.5)
MCV RBC AUTO: 85 FL (ref 78–100)
MCV RBC AUTO: 86 FL (ref 80–100)
MONOCYTES # BLD AUTO: 1.2 10E9/L (ref 0–1.3)
MONOCYTES NFR BLD AUTO: 6 %
MUCOUS THREADS #/AREA URNS LPF: ABNORMAL LPF
MUCOUS THREADS #/AREA URNS LPF: PRESENT /LPF
NEUTROPHILS # BLD AUTO: 16 10E9/L (ref 1.6–8.3)
NEUTROPHILS NFR BLD AUTO: 78 %
NITRATE UR QL: NEGATIVE
NITRATE UR QL: NEGATIVE
NRBC # BLD AUTO: 0 10*3/UL
NRBC BLD AUTO-RTO: 0 /100
PH UR STRIP: 6 PH (ref 5–7)
PH UR STRIP: 6 [PH] (ref 4.5–8)
PLATELET # BLD AUTO: 317 10E9/L (ref 150–450)
PLATELET # BLD AUTO: 317 THOU/UL (ref 140–440)
PMV BLD AUTO: 9.3 FL (ref 8.5–12.5)
POTASSIUM BLD-SCNC: 4.1 MMOL/L (ref 3.5–5)
POTASSIUM SERPL-SCNC: 3.5 MMOL/L (ref 3.4–5.3)
PROT SERPL-MCNC: 8.5 G/DL (ref 6.8–8.8)
RBC # BLD AUTO: 3.59 10E12/L (ref 3.8–5.2)
RBC # BLD AUTO: 3.95 MILL/UL (ref 3.8–5.4)
RBC #/AREA URNS AUTO: 41 /HPF (ref 0–2)
RBC #/AREA URNS AUTO: ABNORMAL HPF
SODIUM SERPL-SCNC: 133 MMOL/L (ref 133–144)
SODIUM SERPL-SCNC: 137 MMOL/L (ref 136–145)
SOURCE: ABNORMAL
SP GR UR STRIP: 1.01 (ref 1–1.03)
SP GR UR STRIP: 1.01 (ref 1–1.03)
SPECIMEN SOURCE: NORMAL
SQUAMOUS #/AREA URNS AUTO: 1 /HPF (ref 0–1)
SQUAMOUS #/AREA URNS AUTO: ABNORMAL LPF
TRANS CELLS #/AREA URNS HPF: ABNORMAL LPF
UROBILINOGEN UR STRIP-ACNC: ABNORMAL
UROBILINOGEN UR STRIP-MCNC: NORMAL MG/DL (ref 0–2)
WBC # BLD AUTO: 20.6 10E9/L (ref 4–11)
WBC #/AREA URNS AUTO: 71 /HPF (ref 0–5)
WBC #/AREA URNS AUTO: ABNORMAL HPF
WBC CLUMPS #/AREA URNS HPF: PRESENT /HPF
WBC CLUMPS #/AREA URNS HPF: PRESENT /[HPF]
WBC: 21 THOU/UL (ref 4–11)
YEAST #/AREA URNS HPF: ABNORMAL /HPF
YEAST #/AREA URNS HPF: ABNORMAL HPF
YEAST #/AREA URNS HPF: ABNORMAL HPF

## 2019-04-10 PROCEDURE — 85025 COMPLETE CBC W/AUTO DIFF WBC: CPT | Performed by: EMERGENCY MEDICINE

## 2019-04-10 PROCEDURE — 87040 BLOOD CULTURE FOR BACTERIA: CPT | Performed by: EMERGENCY MEDICINE

## 2019-04-10 PROCEDURE — 25000128 H RX IP 250 OP 636: Performed by: EMERGENCY MEDICINE

## 2019-04-10 PROCEDURE — 71046 X-RAY EXAM CHEST 2 VIEWS: CPT

## 2019-04-10 PROCEDURE — 25800030 ZZH RX IP 258 OP 636: Performed by: PHYSICIAN ASSISTANT

## 2019-04-10 PROCEDURE — 00000146 ZZHCL STATISTIC GLUCOSE BY METER IP

## 2019-04-10 PROCEDURE — 80053 COMPREHEN METABOLIC PANEL: CPT | Performed by: EMERGENCY MEDICINE

## 2019-04-10 PROCEDURE — 87086 URINE CULTURE/COLONY COUNT: CPT | Performed by: EMERGENCY MEDICINE

## 2019-04-10 PROCEDURE — 96361 HYDRATE IV INFUSION ADD-ON: CPT

## 2019-04-10 PROCEDURE — 81001 URINALYSIS AUTO W/SCOPE: CPT | Performed by: EMERGENCY MEDICINE

## 2019-04-10 PROCEDURE — 25800030 ZZH RX IP 258 OP 636: Performed by: EMERGENCY MEDICINE

## 2019-04-10 PROCEDURE — 96365 THER/PROPH/DIAG IV INF INIT: CPT

## 2019-04-10 PROCEDURE — 25000132 ZZH RX MED GY IP 250 OP 250 PS 637: Performed by: PHYSICIAN ASSISTANT

## 2019-04-10 PROCEDURE — 83605 ASSAY OF LACTIC ACID: CPT | Performed by: EMERGENCY MEDICINE

## 2019-04-10 PROCEDURE — 99310 SBSQ NF CARE HIGH MDM 45: CPT | Performed by: NURSE PRACTITIONER

## 2019-04-10 PROCEDURE — 12000000 ZZH R&B MED SURG/OB

## 2019-04-10 PROCEDURE — 99285 EMERGENCY DEPT VISIT HI MDM: CPT | Mod: 25

## 2019-04-10 PROCEDURE — 87804 INFLUENZA ASSAY W/OPTIC: CPT | Performed by: EMERGENCY MEDICINE

## 2019-04-10 PROCEDURE — 87106 FUNGI IDENTIFICATION YEAST: CPT | Performed by: EMERGENCY MEDICINE

## 2019-04-10 PROCEDURE — 99223 1ST HOSP IP/OBS HIGH 75: CPT | Mod: AI | Performed by: INTERNAL MEDICINE

## 2019-04-10 PROCEDURE — 25000128 H RX IP 250 OP 636: Performed by: PHYSICIAN ASSISTANT

## 2019-04-10 PROCEDURE — 36415 COLL VENOUS BLD VENIPUNCTURE: CPT | Performed by: EMERGENCY MEDICINE

## 2019-04-10 RX ORDER — AMOXICILLIN 250 MG
3 CAPSULE ORAL 2 TIMES DAILY
Status: DISCONTINUED | OUTPATIENT
Start: 2019-04-10 | End: 2019-04-17 | Stop reason: HOSPADM

## 2019-04-10 RX ORDER — AMITRIPTYLINE HYDROCHLORIDE 50 MG/1
100 TABLET ORAL AT BEDTIME
Status: DISCONTINUED | OUTPATIENT
Start: 2019-04-10 | End: 2019-04-17 | Stop reason: HOSPADM

## 2019-04-10 RX ORDER — BACLOFEN 10 MG/1
10 TABLET ORAL DAILY PRN
Status: DISCONTINUED | OUTPATIENT
Start: 2019-04-10 | End: 2019-04-17 | Stop reason: HOSPADM

## 2019-04-10 RX ORDER — NICOTINE POLACRILEX 4 MG
15-30 LOZENGE BUCCAL
Status: DISCONTINUED | OUTPATIENT
Start: 2019-04-10 | End: 2019-04-10

## 2019-04-10 RX ORDER — POLYETHYLENE GLYCOL 3350 17 G/17G
17 POWDER, FOR SOLUTION ORAL 2 TIMES DAILY PRN
Status: DISCONTINUED | OUTPATIENT
Start: 2019-04-10 | End: 2019-04-17 | Stop reason: HOSPADM

## 2019-04-10 RX ORDER — GABAPENTIN 600 MG/1
600 TABLET ORAL 3 TIMES DAILY
Status: DISCONTINUED | OUTPATIENT
Start: 2019-04-10 | End: 2019-04-17 | Stop reason: HOSPADM

## 2019-04-10 RX ORDER — AMOXICILLIN 250 MG
1 CAPSULE ORAL 2 TIMES DAILY PRN
Status: DISCONTINUED | OUTPATIENT
Start: 2019-04-10 | End: 2019-04-17 | Stop reason: HOSPADM

## 2019-04-10 RX ORDER — OXYCODONE HYDROCHLORIDE 15 MG/1
7.5 TABLET ORAL EVERY 6 HOURS PRN
Qty: 20 TABLET | Refills: 0 | Status: SHIPPED | OUTPATIENT
Start: 2019-04-10 | End: 2019-04-10

## 2019-04-10 RX ORDER — AMOXICILLIN 250 MG
2 CAPSULE ORAL 2 TIMES DAILY PRN
Status: DISCONTINUED | OUTPATIENT
Start: 2019-04-10 | End: 2019-04-17 | Stop reason: HOSPADM

## 2019-04-10 RX ORDER — SODIUM CHLORIDE, SODIUM LACTATE, POTASSIUM CHLORIDE, CALCIUM CHLORIDE 600; 310; 30; 20 MG/100ML; MG/100ML; MG/100ML; MG/100ML
INJECTION, SOLUTION INTRAVENOUS CONTINUOUS
Status: DISCONTINUED | OUTPATIENT
Start: 2019-04-10 | End: 2019-04-12

## 2019-04-10 RX ORDER — PROCHLORPERAZINE 25 MG
25 SUPPOSITORY, RECTAL RECTAL EVERY 12 HOURS PRN
Status: DISCONTINUED | OUTPATIENT
Start: 2019-04-10 | End: 2019-04-17 | Stop reason: HOSPADM

## 2019-04-10 RX ORDER — SODIUM CHLORIDE, SODIUM LACTATE, POTASSIUM CHLORIDE, CALCIUM CHLORIDE 600; 310; 30; 20 MG/100ML; MG/100ML; MG/100ML; MG/100ML
1000 INJECTION, SOLUTION INTRAVENOUS CONTINUOUS
Status: DISCONTINUED | OUTPATIENT
Start: 2019-04-10 | End: 2019-04-12

## 2019-04-10 RX ORDER — CEFTRIAXONE 2 G/1
2 INJECTION, POWDER, FOR SOLUTION INTRAMUSCULAR; INTRAVENOUS ONCE
Status: COMPLETED | OUTPATIENT
Start: 2019-04-10 | End: 2019-04-10

## 2019-04-10 RX ORDER — NICOTINE POLACRILEX 4 MG
15-30 LOZENGE BUCCAL
Status: DISCONTINUED | OUTPATIENT
Start: 2019-04-10 | End: 2019-04-17 | Stop reason: HOSPADM

## 2019-04-10 RX ORDER — OXYCODONE HYDROCHLORIDE 5 MG/1
7.5 TABLET ORAL EVERY 6 HOURS PRN
Status: ON HOLD | COMMUNITY
End: 2019-04-17

## 2019-04-10 RX ORDER — ONDANSETRON 4 MG/1
4 TABLET, ORALLY DISINTEGRATING ORAL EVERY 6 HOURS PRN
Status: DISCONTINUED | OUTPATIENT
Start: 2019-04-10 | End: 2019-04-17 | Stop reason: HOSPADM

## 2019-04-10 RX ORDER — ACETAMINOPHEN 325 MG/1
650 TABLET ORAL EVERY 4 HOURS PRN
Status: DISCONTINUED | OUTPATIENT
Start: 2019-04-10 | End: 2019-04-17 | Stop reason: HOSPADM

## 2019-04-10 RX ORDER — DEXTROSE MONOHYDRATE 25 G/50ML
25-50 INJECTION, SOLUTION INTRAVENOUS
Status: DISCONTINUED | OUTPATIENT
Start: 2019-04-10 | End: 2019-04-10

## 2019-04-10 RX ORDER — BACLOFEN 10 MG/1
10 TABLET ORAL 3 TIMES DAILY
Status: DISCONTINUED | OUTPATIENT
Start: 2019-04-10 | End: 2019-04-17 | Stop reason: HOSPADM

## 2019-04-10 RX ORDER — IPRATROPIUM BROMIDE AND ALBUTEROL SULFATE 2.5; .5 MG/3ML; MG/3ML
1 SOLUTION RESPIRATORY (INHALATION) 4 TIMES DAILY
Status: DISCONTINUED | OUTPATIENT
Start: 2019-04-11 | End: 2019-04-17 | Stop reason: HOSPADM

## 2019-04-10 RX ORDER — NALOXONE HYDROCHLORIDE 0.4 MG/ML
.1-.4 INJECTION, SOLUTION INTRAMUSCULAR; INTRAVENOUS; SUBCUTANEOUS
Status: DISCONTINUED | OUTPATIENT
Start: 2019-04-10 | End: 2019-04-17 | Stop reason: HOSPADM

## 2019-04-10 RX ORDER — METOPROLOL SUCCINATE 50 MG/1
50 TABLET, EXTENDED RELEASE ORAL DAILY
Status: DISCONTINUED | OUTPATIENT
Start: 2019-04-11 | End: 2019-04-17 | Stop reason: HOSPADM

## 2019-04-10 RX ORDER — ONDANSETRON 2 MG/ML
4 INJECTION INTRAMUSCULAR; INTRAVENOUS EVERY 6 HOURS PRN
Status: DISCONTINUED | OUTPATIENT
Start: 2019-04-10 | End: 2019-04-17 | Stop reason: HOSPADM

## 2019-04-10 RX ORDER — DEXTROSE MONOHYDRATE 25 G/50ML
25-50 INJECTION, SOLUTION INTRAVENOUS
Status: DISCONTINUED | OUTPATIENT
Start: 2019-04-10 | End: 2019-04-17 | Stop reason: HOSPADM

## 2019-04-10 RX ORDER — PROCHLORPERAZINE MALEATE 5 MG
10 TABLET ORAL EVERY 6 HOURS PRN
Status: DISCONTINUED | OUTPATIENT
Start: 2019-04-10 | End: 2019-04-17 | Stop reason: HOSPADM

## 2019-04-10 RX ORDER — ATORVASTATIN CALCIUM 10 MG/1
10 TABLET, FILM COATED ORAL DAILY
Status: DISCONTINUED | OUTPATIENT
Start: 2019-04-11 | End: 2019-04-17 | Stop reason: HOSPADM

## 2019-04-10 RX ORDER — IPRATROPIUM BROMIDE AND ALBUTEROL SULFATE 2.5; .5 MG/3ML; MG/3ML
1 SOLUTION RESPIRATORY (INHALATION) 4 TIMES DAILY
Status: DISCONTINUED | OUTPATIENT
Start: 2019-04-10 | End: 2019-04-10

## 2019-04-10 RX ADMIN — ENOXAPARIN SODIUM 40 MG: 40 INJECTION SUBCUTANEOUS at 22:16

## 2019-04-10 RX ADMIN — BACLOFEN 10 MG: 10 TABLET ORAL at 22:16

## 2019-04-10 RX ADMIN — SODIUM CHLORIDE, POTASSIUM CHLORIDE, SODIUM LACTATE AND CALCIUM CHLORIDE: 600; 310; 30; 20 INJECTION, SOLUTION INTRAVENOUS at 22:17

## 2019-04-10 RX ADMIN — Medication 7.5 MG: at 23:35

## 2019-04-10 RX ADMIN — TAZOBACTAM SODIUM AND PIPERACILLIN SODIUM 3.38 G: 375; 3 INJECTION, SOLUTION INTRAVENOUS at 22:28

## 2019-04-10 RX ADMIN — SENNOSIDES AND DOCUSATE SODIUM 3 TABLET: 8.6; 5 TABLET ORAL at 22:16

## 2019-04-10 RX ADMIN — ACETAMINOPHEN 650 MG: 325 TABLET, FILM COATED ORAL at 22:16

## 2019-04-10 RX ADMIN — AMITRIPTYLINE HYDROCHLORIDE 100 MG: 50 TABLET, FILM COATED ORAL at 22:16

## 2019-04-10 RX ADMIN — GABAPENTIN 600 MG: 600 TABLET, FILM COATED ORAL at 22:16

## 2019-04-10 RX ADMIN — CEFTRIAXONE 2 G: 2 INJECTION, POWDER, FOR SOLUTION INTRAMUSCULAR; INTRAVENOUS at 20:27

## 2019-04-10 RX ADMIN — SODIUM CHLORIDE, POTASSIUM CHLORIDE, SODIUM LACTATE AND CALCIUM CHLORIDE 1000 ML: 600; 310; 30; 20 INJECTION, SOLUTION INTRAVENOUS at 19:04

## 2019-04-10 ASSESSMENT — ENCOUNTER SYMPTOMS
COUGH: 1
ABDOMINAL PAIN: 0
CHILLS: 0
VOMITING: 0
SHORTNESS OF BREATH: 1
FEVER: 1
COLOR CHANGE: 1
RHINORRHEA: 1
NAUSEA: 0
DIARRHEA: 0

## 2019-04-10 ASSESSMENT — ACTIVITIES OF DAILY LIVING (ADL)
RETIRED_COMMUNICATION: 0-->UNDERSTANDS/COMMUNICATES WITHOUT DIFFICULTY
SWALLOWING: 0-->SWALLOWS FOODS/LIQUIDS WITHOUT DIFFICULTY
NUMBER_OF_TIMES_PATIENT_HAS_FALLEN_WITHIN_LAST_SIX_MONTHS: 1
FALL_HISTORY_WITHIN_LAST_SIX_MONTHS: YES
TRANSFERRING: 3-->ASSISTIVE EQUIPMENT AND PERSON
COGNITION: 0 - NO COGNITION ISSUES REPORTED
TOILETING: 3-->ASSISTIVE EQUIPMENT AND PERSON
DRESS: 3-->ASSISTIVE EQUIPMENT AND PERSON
AMBULATION: 4-->COMPLETELY DEPENDENT
RETIRED_EATING: 0-->INDEPENDENT

## 2019-04-10 ASSESSMENT — MIFFLIN-ST. JEOR: SCORE: 1775.23

## 2019-04-10 NOTE — PROGRESS NOTES
[facility]  [place] Medical Record Number:  [id number]  Place of Service where encounter took place:  [facility] ([facility]) [[id number]]  Chief Complaint   Patient presents with     Nursing Home Acute       HPI:    Amanda Richardson  is a 53 year old (1965), who is being seen today for an episodic care visit.  HPI information obtained from: facility chart records, facility staff, patient report and [facility] chart review.     Ms. Richardson is 53 year old female with PMH of Multiple sclerosis with chronic baclofen pump, chronic pain syndrome, type 2 diabetes, and hyperlipidemia. She has pressure injury to buttocks area. She was hospitalized recently for septic shock and respiratory failure from 1/30/19 to 2/15/19. Subsequently she had lithotripsy and left ureteral stent exchange for left ureteral stone on 3/15/19. She returned to the TCU after this procedure and was sent back to ER the following day. She developed septic shock secondary to the infected left ureteral stone. She has since returned to the TCU and had been doing well.     Nursing staff requested that I see Ms. Richardson today. They report she has temperature of 100.0. Nursing reports she is very lethargic today. She had a fall earlier this morning and slipped out of her wheelchair while reaching for some water. Nursing is also concerned that she is on too much oxycodone.  Nursing also reports that her legs were unwrapped by therapy and they are concerned she has cellulitis.     Ms. Richardson developed a cough over the weekend and chest xray was obtained on 3/7/19 that showed no acute cardiopulmonary disease. She has had fevers on and off since. She has been requiring oxygen 1-2 L since the weekend to keep oxygen >90%. Tmax was today at 100.0. Other residents at the TCU have similar cold symptoms.     Patient has chronic lymphedema and reports erythema to legs at baseline. She is unable to tell me today if legs are  "worse than normal. It is challenging to determine if the area of redness is warmer than normal due to her temperature currently. Unfortunately the therapist that wrapped her legs several days ago is not available today.    Patient is very drowsy during our conversation today and often has to be \"awoken\" during our conversation today. She has been using oxycodone 15mg consistently 3 times per 24 hours.     CBC and BMP are pending today.       Past Medical and Surgical History reviewed in Epic today.    MEDICATIONS:  No current outpatient medications on file.       REVIEW OF SYSTEMS:  7 point ROS of systems including Constitutional, Respiratory, Cardiovascular, Gastroenterology, Genitourinary, Integumentary, Musculoskeletal were all negative except for pertinent positives noted in my HPI.    Objective:  /72   Pulse 109   Temp 100  F (37.8  C)   Resp 18   Ht 1.702 m (5' 7\")   Wt 114.8 kg (253 lb)   LMP 12/11/2011   SpO2 90%   BMI 39.63 kg/m    Exam:  GENERAL APPEARANCE:  Lethargic, pleasant and cooperative, lying in bed on exam today  ENT:  dry mucous membranes, hearing acuity intact  RESP:  respiratory effort normal, no respiratory distress  SKIN: bilateral lower extremities with erythema, warm (but hard to determine if warmer then surrounding skin due to temperature). No open areas noted.     Labs:   [facility]F [facility]. Please refer to them using [employer]Care Everywhere.    ASSESSMENT/PLAN:  (R50.9) Fever, unspecified fever cause  (primary encounter diagnosis)  Comment: Acute, multiple possible sources of infection- cellulitis, pneumonia? (although xray was negative recently), also with recent multiple UTI-suspect these may have been related to the stone- but should be considered  -WBC, BMP pending   Plan: POC discussed with Dr. Briones. UA/UC stat. Start Levaquin 750 mg po x 7 days. Start STAT now after UA/UC collected. VS q4h. Outline red area on legs- notify provider with changes- no " wrapping until ok'd by provider    (L03.119) Cellulitis of lower extremity, unspecified laterality  Comment: Acute, unsure if cellulitis or now- as patient has chronic redness- legs have been wrapped until today  Plan: Outline red area on legs- notify provider with changes- no wrapping until ok'd by provider. Will start levaquin for now-monitor for any changes- may need to add further antibiotic coverage.    (R05) Cough  Comment: Acute-suspect viral? Since recent chest xray negative, but continues to have low oxygen saturations   Plan: Duonebs QID. Mucinex BID. Titrate oxygen to keep oxygen saturations >90%. Monitor respiratory status closely    (G35) MS (multiple sclerosis) (H)  (G89.4) Chronic pain syndrome  Comment: Chronic - w/c bound at baseline. Resides at home with  - is mostly independent at home with pivot transferring. - is now requiring Ez stand due to sacral/buttock ulcer- is now requiring oxygen, had fall this morning and is very lethargic today at visit  Plan: Cut oxycodone dose in half to 7.5 mg q6h PRN. Oxygen to be titrated to keep oxygen saturations >90%. q4h VS. Continue Amitriptyline, Baclofen, Neurontin, and Cymbalta.       Addendum:   WBC returned at 21. UA also positive for UTI. Discussed POC with Dr. Briones. Patient with recent sepsis x2- she decompensates quickly. Is also now requiring oxygen this week. She has multiple possible sources of infection- UTI, cellulitis, pneumonia? Will send to ER for evaluation with concerns for possible sepsis. Report called to ER.       Total time spent with patient visit at the skilled nursing [facility] was 36 minutes including patient visit, review of past records and phone call to ER. . Greater than 50% of total time spent with counseling and coordinating care due to fever with multiple possible sources of infection, cough and other problems as above     Electronically signed by:  BAYLEE Mg CNP

## 2019-04-10 NOTE — ED TRIAGE NOTES
Arrives via EMS from TCU [facility].  Recently admitted for sepsis, left uretal stone removed.  Hx cellulitis, pressure wound to sacral area.  Recent cough started on Friday productive, CXR negative, UTI.  Fever at TCU, WBC's were elevated.  Was 88% on RA, placed on O2 at 2L.  MV=687 en route.  Sent to r/o sepsis. Afebrile here.  ABCD's intact; A/o x 4.

## 2019-04-10 NOTE — ED NOTES
Bed: ED22  Expected date: 4/10/19  Expected time: 6:21 PM  Means of arrival: Ambulance  Comments:  BV3

## 2019-04-10 NOTE — LETTER
4/10/2019        RE: Amanda Richardson  [address] [place] [address]        [facility]  [facility] Medical Record Number:  [id number]  Place of Service where encounter took place:  [facility] ([facility]) [[id number]]  Chief Complaint   Patient presents with     Nursing Home Acute       HPI:    Amanda Richardson  is a 53 year old (1965), who is being seen today for an episodic care visit.  HPI information obtained from: facility chart records, facility staff, patient report and [facility] chart review.     Ms. Richardson is 53 year old female with PMH of Multiple sclerosis with chronic baclofen pump, chronic pain syndrome, type 2 diabetes, and hyperlipidemia. She has pressure injury to buttocks area. She was hospitalized recently for septic shock and respiratory failure from 1/30/19 to 2/15/19. Subsequently she had lithotripsy and left ureteral stent exchange for left ureteral stone on 3/15/19. She returned to the TCU after this procedure and was sent back to ER the following day. She developed septic shock secondary to the infected left ureteral stone. She has since returned to the TCU and had been doing well.     Nursing staff requested that I see Ms. Richardson today. They report she has temperature of 100.0. Nursing reports she is very lethargic today. She had a fall earlier this morning and slipped out of her wheelchair while reaching for some water. Nursing is also concerned that she is on too much oxycodone.  Nursing also reports that her legs were unwrapped by therapy and they are concerned she has cellulitis.     Ms. Richardson developed a cough over the weekend and chest xray was obtained on 3/7/19 that showed no acute cardiopulmonary disease. She has had fevers on and off since. She has been requiring oxygen 1-2 L since the weekend to keep oxygen >90%. Tmax was today at 100.0. Other residents at the TCU have similar cold symptoms.     Patient has chronic  "lymphedema and reports erythema to legs at baseline. She is unable to tell me today if legs are worse than normal. It is challenging to determine if the area of redness is warmer than normal due to her temperature currently. Unfortunately the therapist that wrapped her legs several days ago is not available today.    Patient is very drowsy during our conversation today and often has to be \"awoken\" during our conversation today. She has been using oxycodone 15mg consistently 3 times per 24 hours.     CBC and BMP are pending today.       Past Medical and Surgical History reviewed in Epic today.    MEDICATIONS:  No current outpatient medications on file.       REVIEW OF SYSTEMS:  7 point ROS of systems including Constitutional, Respiratory, Cardiovascular, Gastroenterology, Genitourinary, Integumentary, Musculoskeletal were all negative except for pertinent positives noted in my HPI.    Objective:  /72   Pulse 109   Temp 100  F (37.8  C)   Resp 18   Ht 1.702 m (5' 7\")   Wt 114.8 kg (253 lb)   LMP 12/11/2011   SpO2 90%   BMI 39.63 kg/m     Exam:  GENERAL APPEARANCE:  Lethargic, pleasant and cooperative, lying in bed on exam today  ENT:  dry mucous membranes, hearing acuity intact  RESP:  respiratory effort normal, no respiratory distress  SKIN: bilateral lower extremities with erythema, warm (but hard to determine if warmer then surrounding skin due to temperature). No open areas noted.     Labs:   Labs done in SNF are in [facility]. Please refer to them using [employer]/Care Everywhere.    ASSESSMENT/PLAN:  (R50.9) Fever, unspecified fever cause  (primary encounter diagnosis)  Comment: Acute, multiple possible sources of infection- cellulitis, pneumonia? (although xray was negative recently), also with recent multiple UTI-suspect these may have been related to the stone- but should be considered  -WBC, BMP pending   Plan: POC discussed with Dr. Briones. / stat. Start Levaquin 750 mg po x 7 days. Start " STAT now after UA/UC collected. VS q4h. Outline red area on legs- notify provider with changes- no wrapping until ok'd by provider    (L03.119) Cellulitis of lower extremity, unspecified laterality  Comment: Acute, unsure if cellulitis or now- as patient has chronic redness- legs have been wrapped until today  Plan: Outline red area on legs- notify provider with changes- no wrapping until ok'd by provider. Will start levaquin for now-monitor for any changes- may need to add further antibiotic coverage.    (R05) Cough  Comment: Acute-suspect viral? Since recent chest xray negative, but continues to have low oxygen saturations   Plan: Duonebs QID. Mucinex BID. Titrate oxygen to keep oxygen saturations >90%. Monitor respiratory status closely    (G35) MS (multiple sclerosis) (H)  (G89.4) Chronic pain syndrome  Comment: Chronic - w/c bound at baseline. Resides at home with  - is mostly independent at home with pivot transferring. - is now requiring Ez stand due to sacral/buttock ulcer- is now requiring oxygen, had fall this morning and is very lethargic today at visit  Plan: Cut oxycodone dose in half to 7.5 mg q6h PRN. Oxygen to be titrated to keep oxygen saturations >90%. q4h VS. Continue Amitriptyline, Baclofen, Neurontin, and Cymbalta.       Addendum:   WBC returned at 21. UA also positive for UTI. Discussed POC with Dr. Briones. Patient with recent sepsis x2- she decompensates quickly. Is also now requiring oxygen this week. She has multiple possible sources of infection- UTI, cellulitis, pneumonia? Will send to ER for evaluation with concerns for possible sepsis. Report called to ER.       Total time spent with patient visit at the [facility] nursing [facility] was 36 minutes including patient visit, review of past records and phone call to ER. . Greater than 50% of total time spent with counseling and coordinating care due to fever with multiple possible sources of infection, cough and other problems as above      Electronically signed by:  BAYELE Mg CNP         Sincerely,        Margaret Neal, BAYLEE CNP

## 2019-04-10 NOTE — LETTER
Transition Communication Hand-off for Care Transitions to Next Level of Care Provider    Name: Amanda Richardson  : 1965  MRN #: [id number]  Primary Care Provider: Julius Hassan  Primary Care MD Name: Dr Hassan  Primary Clinic: [address] [place] [id number]  Primary Care Clinic Name: [facility]  Reason for Hospitalization:  Generalized muscle weakness [M62.81]  Cellulitis of lower extremity, unspecified laterality [L03.119]  Sepsis, due to unspecified organism (H) [A41.9]  Urinary tract infection with hematuria, site unspecified [N39.0, R31.9]  Upper respiratory tract infection, unspecified type [J06.9]  Admit Date/Time: 4/10/2019  6:29 PM  Discharge Date:   Payor Source: Payor: HU[employer] / Plan: HU[employer] MEDICARE ADVANTAGE / Product Type: Medicare /     Readmission Assessment Measure (JOAQUIN) Risk Score/category: High    Reason for Communication Hand-off Referral: Fragility    Discharge Plan: [facility]    Discharge Needs Assessment:  Needs      Most Recent Value   Equipment Currently Used at Home  commode, grab bar, toilet, grab bar, tub/shower, shower chair   # of Referrals Placed by [facility]  Post Acute Facilities   Skilled Nursing Facility  [facility] [phone], Fax: [phone]          Follow-up plan:  No future appointments.    Any outstanding tests or procedures:    Procedures     Future Labs/Procedures    Oxygen - Nasal cannula     Comments:    Intermittently needing 1 Lpm by nasal cannula to keep O2 sats 92% or greater while sleeping.  Wean as able.  Encourage incentive spirometer          Referrals     Future Labs/Procedures    Occupational Therapy Adult Consult     Comments:    Evaluate and treat as clinically indicated.    Reason:  Physical deconditioning, multiple sclerosis    Physical Therapy Adult Consult     Comments:    Evaluate and treat as clinically indicated.    Reason: Physical deconditioning, multiple sclerosis                      Key Recommendations:   JOAQUIN ELEVATED.  Pt readmitted from [facility] TCU.  Pt was hospitalized 3/16 to 3/26 with septic shock and also 1/30 to 2/15 with Septic shock.  Pt has a history of MS.  Pt does have a decubitus ulcer.  It was debrided by surgery.  She will need to follow up for her wound.  Her previous baseline was that she was able to pivot.      Pt was hospitalized for an infection that may have been from pneumonia or from urinary tract infection.  This improved with IV antibiotics. Pt will complete the course with Augmentin and fluconazole at the TCU. Wound care will be very important along with follow-up in the wound care clinic.  She had urinary retention and a Bobo catheter is left in place.  This should be removed in 1-2 weeks for a voiding trial or a urology follow-up in clinic during this time.    She was dc'd to [facility] on discharge.    Gabriela Soriano    AVS/Discharge Summary is the source of truth; this is a helpful guide for improved communication of patient story

## 2019-04-10 NOTE — ED PROVIDER NOTES
History     Chief Complaint:  Cellulitis    HPI   Amanda Richardson is a 53 year old female with a complex past medical history including multiple sclerosis now wheelchair-dependent, chronic kidney disease, and diabetes mellitus who presents with concerns for cellulitis. The patient currently lives in [facility] transitional care [facility] after two consecutive hospitalizations where she was septic related to UTI and infected ureteral stone s/p removal with Dr. Chauhan and was discharged home on 3/26/19. On Friday, 5 days ago, the patient reports she first started to develop a head cold with symptoms including cough, rhinorrhea, and congestion. The patient also notes she has been running a low grade fever with the highest measured temperature 100.7F. Thus, over the past few days, the patient states she underwent chest x-ray, which returned negative, as well as testing for a UTI, which returned positive; she was started on Levaquin 1-2 days ago as a result. This afternoon, she was evaluated again and they outlined her erythema along her bilateral lower extremities, had her pressure ulcer dressing changed, and noted her to have mild leukocytosis, so EMS was called to bring her to the ED for further evaluation. En route to the ED, the patient was hypoxic at 88%, so she was placed on 2L oxygen via nasal cannula; she denies any baseline supplemental oxygen use at home. Her blood sugar was 153 per EMS, though the patient states they have been lower than that at [facility]. Here, the patient reports she feels more tired than normal as well, but denies any nausea, vomiting, diarrhea, history of COPD or cardiac events, or other acute symptoms or pertinent history. She notes she has some baseline redness to her bilateral lower extremities, but states they are worse than normal currently. The patient notes she did receive her influenza vaccination this year.    Allergies:  Lisinopril  Cyclobenzaprine    Medications:     Amitriptyline  Atorvastatin  Baclofen  Dulcolax  Colace  Lasix  Gabapentin  Hydrochlorothiazide  Duoneb solution  Losartan  Metformin  Metoprolol  Oxycodone  Potassium chloride  Senokot    Past Medical History:    Arrhythmia  Chronic pain  Chronic kidney disease  Colon polyps  Hyperlipidemia  Hypertension  Moderate depressive disorder  Multiple sclerosis  Non-Hodgkin's lymphoma  Lumbar spinal stenosis  T-cell lymphoma  Type II diabetes mellitus    Past Surgical History:    Cystoscopy with retrogrades and stent placement, left  Cystoscopy with retrogrades and laser holmium lithotripsy, left  Cystoscopy with stent removal, left  Fusion lumbar anterior & posterior, L4-L5  Insert intrathecal baclofen pump  Irrigation and debridement, right lower extremity  Non-Hodgkin's lymphoma left nasal sinus surgery  Open reduction internal fixation right ankle x2    Family History:    Coronary artery disease  Congestive heart failure  Cancer  Thyroid disease with goiter  Hypertension  Breast cancer    Social History:  Smoking status: Former smoker, quit 8/4/2013  Alcohol use: No  Drug use: No  PCP: Julius Hassan  Marital Status:  [2]     Review of Systems   Constitutional: Positive for fever. Negative for chills.   HENT: Positive for congestion and rhinorrhea.    Respiratory: Positive for cough and shortness of breath.    Gastrointestinal: Negative for abdominal pain, diarrhea, nausea and vomiting.   Skin: Positive for color change.   All other systems reviewed and are negative.    Physical Exam     Patient Vitals for the past 24 hrs:   BP Temp Temp src Pulse Heart Rate Resp SpO2   04/10/19 2015 144/51 -- -- (!) 43 -- -- 90 %   04/10/19 2012 121/69 -- -- 91 -- -- 98 %   04/10/19 2000 -- -- -- -- -- -- 90 %   04/10/19 1945 -- -- -- -- -- -- 94 %   04/10/19 1915 -- -- -- -- -- -- 96 %   04/10/19 1900 -- -- -- -- -- -- 90 %   04/10/19 1845 -- -- -- -- -- -- 91 %   04/10/19 1836 95/54 98.4  F (36.9  C) Oral -- 93 18 90 %    04/10/19 1830 97/86 -- -- -- -- -- --     Physical Exam  Vital signs and nursing notes reviewed.     Constitutional: laying on gurney appears uncomfortable  HENT: Oropharynx is clear and moist, no pharyngeal erythema or swelling  Eyes: Conjunctivae are normal bilaterally. Pupils equal  Neck: normal range of motion  Cardiovascular: Normal rate, regular rhythm, normal heart sounds.   Pulmonary/Chest: Nasal congestion. Occasional cough. Occasional congested sounds bilaterally. No wheezing or evidence of respiratory distress.  Nasal cannula in place.  Abdominal: Soft. Bowel sounds are normal. No tenderness to palpation. No rebound or guarding.   Musculoskeletal: Lymphedema bilaterally.  Neurological: Alert and oriented. No focal weakness  Skin: Erythema of the left lower extremity from above the ankle to mid-calf. Right lower extremity just below the knee to ankle. No weeping of the skin or open sores. Decubitus ulcer 1 cm x 4 cm left upper buttock.  Psych: normal affect    Emergency Department Course   Imaging:  Radiographic findings were communicated with the patient who voiced understanding of the findings.    Chest XR, PA, & LAT:  1. Mildly prominent cardiac silhouette.  2. Small opacity at the right lung base compatible with a small area  of infiltrate or atelectasis.      As read by Radiology.    Laboratory:  CBC: WBC 20.6 (H), HGB 9.5 (L), o/w WNL ()  CMP: Carbon dioxide 36 (H), Anion GAP 1 (L), Glucose 118 (H), Calcium 8.2 (L), Albumin 2.4 (L), o/w WNL (Creatinine 0.83)  Lactic acid (1932): 1.0  Blood cultures: In process  Influenza A/B antigen: Influenza A negative, Influenza B negative  Cath UA: Moderate blood, protein albumin 20, large leukocyte esterase, WBC 71 (H), RBC 41 (H), WBC clumps present, few bacteria, few yeast, mucous present, o/w negative  Urine culture: In process    Interventions:  1904: LR 1L IV Bolus  2027: 2 g Ceftriaxone IV    Emergency Department Course:  The patient arrived in  the emergency department via EMS.  Past medical records, nursing notes, and vitals reviewed.  1834: I performed an exam of the patient and obtained history, as documented above.  IV inserted and blood drawn. The patient was placed on continuous cardiac monitoring and pulse oximetry.  UA performed, results above. Influenza screening performed, results above.  The patient was sent for a chest x-ray while in the emergency department, findings above.    2011: I rechecked the patient. Findings and plan explained to the patient who consents to admission.     2015: I discussed the patient's case with Heidi Mcelroy PA-C for Dr. Jennings of the hospitalist service, who will admit the patient to a medical bed for further monitoring, evaluation, and treatment.    Impression & Plan    CMS Diagnoses: Patient does not have any signs of severe sepsis or septic shock.    Medical Decision Making:  Amanda Richardson is a pleasant 53 year old female who presents to the ED from her TCU care facility due to concerns for possible early sepsis. On examination, she had findings that could represent cellulitis of bilateral lower extremities, but it is difficult to assess whether this is due to chronic lymphedema. She also had findings of an acute urinary tract infection and a possible very small pneumonia. The patient was placed on Levaquin recently for suspected UTI. She has an elevated white count, but normal lactate. After IV fluids, she has normalizing blood pressure readings. There is no suggestion of severe sepsis or septic shock, but there are findings to suggest early sepsis. I will start the patient on Rocephin. Cultures are pending. I discussed and reviewed the findings with the patient and she is in agreement to be admitted for further cares. She was admitted under the care of Dr. Jennings and Heidi Mcelroy PA-C.    Diagnosis:    ICD-10-CM   1. Urinary tract infection with hematuria, site unspecified N39.0    R31.9   2.  Sepsis, due to unspecified organism (H) A41.9   3. Upper respiratory tract infection, unspecified type J06.9   4. Cellulitis of lower extremity, unspecified laterality L03.119   5. Generalized muscle weakness M62.81     Disposition: Admitted to a medical bed    Taylor Gutierrez  4/10/2019   [facility] EMERGENCY DEPARTMENT    I, Taylor Gutierrez, am serving as a scribe at 6:34 PM on 4/10/2019 to document services personally performed by Andrew Parekh MD based on my observations and the provider's statements to me.      Andrew Parekh MD  04/11/19 0007

## 2019-04-10 NOTE — LETTER
Key Recommendations:  JOAQUIN ELEVATED.  Pt readmitted from [facility] TCU.  Pt was hospitalized 3/16 to 3/26 with septic shock and also 1/30 to 2/15 with Septic shock.  Pt has a history of MS.  Pt does have a decubitus ulcer.  It was debrided by surgery.  She will need to follow up with *** for her wound.  Her previous baseline was that she was able to pivot.  Pt came from TCU.  Recommendations are ***.  Pt was discharge to *** TCU.     Gabriela Soriano    AVS/Discharge Summary is the source of truth; this is a helpful guide for improved communication of patient story

## 2019-04-11 LAB
ANION GAP SERPL CALCULATED.3IONS-SCNC: 3 MMOL/L (ref 3–14)
BACTERIA SPEC CULT: NORMAL
BASOPHILS # BLD AUTO: 0 10E9/L (ref 0–0.2)
BASOPHILS NFR BLD AUTO: 0.2 %
BUN SERPL-MCNC: 16 MG/DL (ref 7–30)
CALCIUM SERPL-MCNC: 8.2 MG/DL (ref 8.5–10.1)
CHLORIDE SERPL-SCNC: 99 MMOL/L (ref 94–109)
CO2 SERPL-SCNC: 35 MMOL/L (ref 20–32)
CREAT SERPL-MCNC: 0.64 MG/DL (ref 0.52–1.04)
DIFFERENTIAL METHOD BLD: ABNORMAL
EOSINOPHIL # BLD AUTO: 0.3 10E9/L (ref 0–0.7)
EOSINOPHIL NFR BLD AUTO: 2.2 %
ERYTHROCYTE [DISTWIDTH] IN BLOOD BY AUTOMATED COUNT: 17.1 % (ref 10–15)
GFR SERPL CREATININE-BSD FRML MDRD: >90 ML/MIN/{1.73_M2}
GLUCOSE BLDC GLUCOMTR-MCNC: 114 MG/DL (ref 70–99)
GLUCOSE BLDC GLUCOMTR-MCNC: 122 MG/DL (ref 70–99)
GLUCOSE BLDC GLUCOMTR-MCNC: 125 MG/DL (ref 70–99)
GLUCOSE BLDC GLUCOMTR-MCNC: 74 MG/DL (ref 70–99)
GLUCOSE BLDC GLUCOMTR-MCNC: 85 MG/DL (ref 70–99)
GLUCOSE SERPL-MCNC: 117 MG/DL (ref 70–99)
HCT VFR BLD AUTO: 30.7 % (ref 35–47)
HGB BLD-MCNC: 9.3 G/DL (ref 11.7–15.7)
IMM GRANULOCYTES # BLD: 0.2 10E9/L (ref 0–0.4)
IMM GRANULOCYTES NFR BLD: 1 %
LACTATE BLD-SCNC: 1.1 MMOL/L (ref 0.7–2)
LYMPHOCYTES # BLD AUTO: 0.7 10E9/L (ref 0.8–5.3)
LYMPHOCYTES NFR BLD AUTO: 4.4 %
MCH RBC QN AUTO: 26.3 PG (ref 26.5–33)
MCHC RBC AUTO-ENTMCNC: 30.3 G/DL (ref 31.5–36.5)
MCV RBC AUTO: 87 FL (ref 78–100)
MONOCYTES # BLD AUTO: 0.7 10E9/L (ref 0–1.3)
MONOCYTES NFR BLD AUTO: 4.7 %
NEUTROPHILS # BLD AUTO: 13.3 10E9/L (ref 1.6–8.3)
NEUTROPHILS NFR BLD AUTO: 87.5 %
NRBC # BLD AUTO: 0 10*3/UL
NRBC BLD AUTO-RTO: 0 /100
PLATELET # BLD AUTO: 287 10E9/L (ref 150–450)
POTASSIUM SERPL-SCNC: 3.7 MMOL/L (ref 3.4–5.3)
RBC # BLD AUTO: 3.53 10E12/L (ref 3.8–5.2)
SODIUM SERPL-SCNC: 137 MMOL/L (ref 133–144)
WBC # BLD AUTO: 15.2 10E9/L (ref 4–11)

## 2019-04-11 PROCEDURE — 94640 AIRWAY INHALATION TREATMENT: CPT

## 2019-04-11 PROCEDURE — 25000125 ZZHC RX 250: Performed by: INTERNAL MEDICINE

## 2019-04-11 PROCEDURE — 25000132 ZZH RX MED GY IP 250 OP 250 PS 637: Performed by: INTERNAL MEDICINE

## 2019-04-11 PROCEDURE — 83605 ASSAY OF LACTIC ACID: CPT | Performed by: INTERNAL MEDICINE

## 2019-04-11 PROCEDURE — 25000132 ZZH RX MED GY IP 250 OP 250 PS 637: Performed by: PHYSICIAN ASSISTANT

## 2019-04-11 PROCEDURE — 12000000 ZZH R&B MED SURG/OB

## 2019-04-11 PROCEDURE — 11042 DBRDMT SUBQ TIS 1ST 20SQCM/<: CPT | Performed by: SURGERY

## 2019-04-11 PROCEDURE — 0JB70ZZ EXCISION OF BACK SUBCUTANEOUS TISSUE AND FASCIA, OPEN APPROACH: ICD-10-PCS | Performed by: SURGERY

## 2019-04-11 PROCEDURE — 94640 AIRWAY INHALATION TREATMENT: CPT | Mod: 76

## 2019-04-11 PROCEDURE — 80048 BASIC METABOLIC PNL TOTAL CA: CPT | Performed by: INTERNAL MEDICINE

## 2019-04-11 PROCEDURE — 36415 COLL VENOUS BLD VENIPUNCTURE: CPT | Performed by: INTERNAL MEDICINE

## 2019-04-11 PROCEDURE — 00000146 ZZHCL STATISTIC GLUCOSE BY METER IP

## 2019-04-11 PROCEDURE — 25800030 ZZH RX IP 258 OP 636: Performed by: PHYSICIAN ASSISTANT

## 2019-04-11 PROCEDURE — 97602 WOUND(S) CARE NON-SELECTIVE: CPT

## 2019-04-11 PROCEDURE — 40000275 ZZH STATISTIC RCP TIME EA 10 MIN

## 2019-04-11 PROCEDURE — 99221 1ST HOSP IP/OBS SF/LOW 40: CPT | Mod: 25 | Performed by: SURGERY

## 2019-04-11 PROCEDURE — 99233 SBSQ HOSP IP/OBS HIGH 50: CPT | Performed by: INTERNAL MEDICINE

## 2019-04-11 PROCEDURE — 40000274 ZZH STATISTIC RCP CONSULT EA 30 MIN

## 2019-04-11 PROCEDURE — 85025 COMPLETE CBC W/AUTO DIFF WBC: CPT | Performed by: INTERNAL MEDICINE

## 2019-04-11 PROCEDURE — G0463 HOSPITAL OUTPT CLINIC VISIT: HCPCS | Mod: 25

## 2019-04-11 PROCEDURE — 25000128 H RX IP 250 OP 636: Performed by: PHYSICIAN ASSISTANT

## 2019-04-11 RX ORDER — MULTIPLE VITAMINS W/ MINERALS TAB 9MG-400MCG
1 TAB ORAL DAILY
Status: DISCONTINUED | OUTPATIENT
Start: 2019-04-11 | End: 2019-04-17 | Stop reason: HOSPADM

## 2019-04-11 RX ORDER — ALBUTEROL SULFATE 0.83 MG/ML
2.5 SOLUTION RESPIRATORY (INHALATION) EVERY 4 HOURS PRN
Status: DISCONTINUED | OUTPATIENT
Start: 2019-04-11 | End: 2019-04-17 | Stop reason: HOSPADM

## 2019-04-11 RX ORDER — OXYCODONE HYDROCHLORIDE 5 MG/1
15 TABLET ORAL EVERY 6 HOURS PRN
Status: DISCONTINUED | OUTPATIENT
Start: 2019-04-11 | End: 2019-04-17 | Stop reason: HOSPADM

## 2019-04-11 RX ADMIN — ATORVASTATIN CALCIUM 10 MG: 10 TABLET, FILM COATED ORAL at 09:02

## 2019-04-11 RX ADMIN — GABAPENTIN 600 MG: 600 TABLET, FILM COATED ORAL at 09:02

## 2019-04-11 RX ADMIN — Medication: at 14:36

## 2019-04-11 RX ADMIN — GABAPENTIN 600 MG: 600 TABLET, FILM COATED ORAL at 21:43

## 2019-04-11 RX ADMIN — IPRATROPIUM BROMIDE AND ALBUTEROL SULFATE 3 ML: .5; 3 SOLUTION RESPIRATORY (INHALATION) at 08:12

## 2019-04-11 RX ADMIN — Medication 7.5 MG: at 13:12

## 2019-04-11 RX ADMIN — AMITRIPTYLINE HYDROCHLORIDE 100 MG: 50 TABLET, FILM COATED ORAL at 21:43

## 2019-04-11 RX ADMIN — GABAPENTIN 600 MG: 600 TABLET, FILM COATED ORAL at 16:39

## 2019-04-11 RX ADMIN — Medication 7.5 MG: at 06:50

## 2019-04-11 RX ADMIN — TAZOBACTAM SODIUM AND PIPERACILLIN SODIUM 3.38 G: 375; 3 INJECTION, SOLUTION INTRAVENOUS at 21:43

## 2019-04-11 RX ADMIN — IPRATROPIUM BROMIDE AND ALBUTEROL SULFATE 3 ML: .5; 3 SOLUTION RESPIRATORY (INHALATION) at 15:54

## 2019-04-11 RX ADMIN — ACETAMINOPHEN 650 MG: 325 TABLET, FILM COATED ORAL at 14:34

## 2019-04-11 RX ADMIN — SODIUM CHLORIDE, POTASSIUM CHLORIDE, SODIUM LACTATE AND CALCIUM CHLORIDE: 600; 310; 30; 20 INJECTION, SOLUTION INTRAVENOUS at 15:23

## 2019-04-11 RX ADMIN — SENNOSIDES AND DOCUSATE SODIUM 2 TABLET: 8.6; 5 TABLET ORAL at 21:43

## 2019-04-11 RX ADMIN — BACLOFEN 10 MG: 10 TABLET ORAL at 16:39

## 2019-04-11 RX ADMIN — BACLOFEN 10 MG: 10 TABLET ORAL at 21:43

## 2019-04-11 RX ADMIN — Medication 7.5 MG: at 14:33

## 2019-04-11 RX ADMIN — METOPROLOL SUCCINATE 50 MG: 50 TABLET, EXTENDED RELEASE ORAL at 09:02

## 2019-04-11 RX ADMIN — BACLOFEN 10 MG: 10 TABLET ORAL at 09:02

## 2019-04-11 RX ADMIN — ENOXAPARIN SODIUM 40 MG: 40 INJECTION SUBCUTANEOUS at 21:43

## 2019-04-11 RX ADMIN — TAZOBACTAM SODIUM AND PIPERACILLIN SODIUM 3.38 G: 375; 3 INJECTION, SOLUTION INTRAVENOUS at 03:36

## 2019-04-11 RX ADMIN — MULTIPLE VITAMINS W/ MINERALS TAB 1 TABLET: TAB at 14:33

## 2019-04-11 RX ADMIN — IPRATROPIUM BROMIDE AND ALBUTEROL SULFATE 3 ML: .5; 3 SOLUTION RESPIRATORY (INHALATION) at 11:57

## 2019-04-11 RX ADMIN — TAZOBACTAM SODIUM AND PIPERACILLIN SODIUM 3.38 G: 375; 3 INJECTION, SOLUTION INTRAVENOUS at 10:54

## 2019-04-11 RX ADMIN — Medication: at 20:21

## 2019-04-11 RX ADMIN — IPRATROPIUM BROMIDE AND ALBUTEROL SULFATE 3 ML: .5; 3 SOLUTION RESPIRATORY (INHALATION) at 19:08

## 2019-04-11 RX ADMIN — SODIUM CHLORIDE, POTASSIUM CHLORIDE, SODIUM LACTATE AND CALCIUM CHLORIDE: 600; 310; 30; 20 INJECTION, SOLUTION INTRAVENOUS at 06:46

## 2019-04-11 RX ADMIN — TAZOBACTAM SODIUM AND PIPERACILLIN SODIUM 3.38 G: 375; 3 INJECTION, SOLUTION INTRAVENOUS at 16:37

## 2019-04-11 RX ADMIN — OXYCODONE HYDROCHLORIDE 15 MG: 5 TABLET ORAL at 20:18

## 2019-04-11 ASSESSMENT — ACTIVITIES OF DAILY LIVING (ADL)
ADLS_ACUITY_SCORE: 34
BATHING: 4-->COMPLETELY DEPENDENT
ADLS_ACUITY_SCORE: 37
ADLS_ACUITY_SCORE: 31
ADLS_ACUITY_SCORE: 37
ADLS_ACUITY_SCORE: 33
ADLS_ACUITY_SCORE: 31

## 2019-04-11 NOTE — PROGRESS NOTES
[facility] Nurse Inpatient Adult Pressure Injury (PI) Wound Assessment     Assessment of PI(s) on pt's:   Buttocks     Data:   Patient History:      per MD note(s):  53 year old female who has extensive past medical history including recent hospitalizations for septic shock related to urinary source in setting of infected renal stone, multiple sclerosis, and diabetes admitted for malaise and leukocytosis.  Patient has been recovering in a TCU for the past few months following a hospitalization for septic shock due to urinary source from infected kidney stone.  She has since had management of stone and subsequent stent removal 3/27/2019.  She has most recently been treated for Pseudomonas UTI.      Pt is well known to [facility] and seen on last admission on 3/22 for Deep Tissue Pressure Injury to bilateral buttocks present on admission. Wound to left buttock is now evolving and debriding into an unstageable wound. Will recommend Surgery evaluation or transfer to Counts include 234 beds at the [facility] for Plastic MD evaluation and treatment. Will plan to start Dakin's solution to debride and cleanse the wound.      Mattress:  Standard , ordered Pulsate  Current pressure relieving devices:  Mepilex dressing and Pillows    Moisture Management:  Incontinence Protocol and Diaper    Catheter secured? Not applicable    Current Diet / Nutrition:       Orders Placed This Encounter        Combination Diet Regular Diet Adult; 0685-3593 Calories: Moderate Consistent CHO (4-6 CHO units/meal)        Nutrition is following for wound healing protocol    Issac Risk Assessment  Sensory Perception: 3-->slightly limited    Moisture: 3-->occasionally moist   Activity: 2-->chairfast     Mobility: 2-->very limited   Nutrition: 3-->adequate   Issac Score: 14    Labs:   Recent Labs   Lab Test 04/10/19  1855  03/17/19  0615  02/04/19  0545  10/03/13  1614   ALBUMIN 2.4*  --  2.1*   < >  --    < > 3.6*   HGB 9.5*   < > 9.4*   < > 10.3*   < > 12.7   INR  --   --    --   --   --   --  0.93   WBC 20.6*   < > 31.1*   < > 21.6*   < > 14.4*   A1C  --   --  6.3*   < >  --    < >  --    CRP  --   --   --   --  57.7*   < >  --     < > = values in this interval not displayed.                                                                                                                          Pressure Injury Assessment  (location #1-2):   Bilateral Buttocks    Wound History:   See above    Specific Dimensions (length x width x depth, in cm) :      Right Buttocks: 0.5 x 1 x 0.1cm 100% moist creamy yellowish white tissue     Left Buttocks: 5.5 x 3.5 x 4cm 100% non viable grey spongy tissue    Undermining: from 11 - 5o' up to 4cm    Periwound Skin: erosion of epidermis, blanchable erythema ,      Drainage:    Amount: moderate,  Color: creamy and tan       Odor: mild    Pain:  minimal , sharp         Intervention:     Patient's chart evaluated.      Issac Interventions:  Current Issac Interventions and Care Plan reviewed and updated, appropriate at this time.    Wound was assessed.    Wound Care:  done: Per POC by RN; visual assessment with staff of skin ,    Orders  Written    Supplies  ordered: Per POC and discussed with RN    Discussed plan of care with Patient and Nurse           Assessment:     Pressure Injury located on bilateral buttocks: Unstageable and present on admission    Status: wound  initial assessment and evolving, Markedly worsening and in need of surgical debridement and Plastic MD follow up, Symptomatic due to pain and drainage    Left deep perineal groin skin fold has small chronic wound 0.5x 0.5 x 0.2cm 100% red with scattered moist hypergranulation tissue along superior fold from hydradenitis. Will plan to use Triad paste to help debride and keep clean.         Plan:     Nursing to notify the Provider(s) and re-consult the [facility] Nurse if wound(s) deteriorate(s)or if the wound care plan needs reevaluation.    Plan for wound care to wound located on Bilateral  "Buttocks and Groin: BID and PRN    1. Spritz with wound spray, pat dry     2. Dust to open superficial wounds to groin and right buttocks with Stoma powder    3. Moisten one Kerlix fluff (squares) with Dakin's solution, squeeze out excess, unfluff and tuck into the Left Buttock wound    4. Apply layer of Triad paste (yellow tube) to romeo wound, cover right buttock and left groin wounds.    5. Avoid tape- tuck ABD in brief or don't use at all and change brief as needed  6. Pressure Injury Prevention (PIP) MEASURES:  1. If pt is refusing to turn or reposition they must be educated on the potential of injury due to not off loading.  Then this \"educated refusal\" needs to be documented as an \"educated refusal to turn/ reposition\" and document if alert, etc. Additionally, you MUST notify the charge nurse, nurse manager and the provider of the pt's refusal to reposition.  2. Follow Issac Risk recommendations  Nutrition following  Moisture and Incontinence issues- Critic aid paste   3. CHAIR POSITIONING: up for meals only then back to bed  REPOSITIONING   Fully off load every 1-1.5 hours (stand x 5 minutes or back to bed)   Shift side to side every 15 minutes  Chair cushion ([id number]) when up to chair (pillow does not actually off load)  4.  BED:    POSTIONING  No direct supine positioning, position only side to side  Keep HOB below 30 degrees  Reposition every 1-2 hours  Keep heels elevated- one pillow under each leg from knee to heels, checking heels are free  Pulsate specialty mattress        [facility] Nurse will return: weekly      "

## 2019-04-11 NOTE — PROGRESS NOTES
"[facility] RCAT     Date: 04/11/19  Admission Dx: Leukocytosis  Pulmonary History: Former smoker 1/2 ppd  Home Nebulizer/MDI Use: Duoneb QID (per pharmacy note) pt states she doesn't take  Home Oxygen: None  Acuity Level (RCAT flow sheet): 3  Aerosol Therapy initiated: Duoneb QID, Albuterol Q4 prn      Pulmonary Hygiene initiated: Coughing techniques      Volume Expansion initiated: Incentive Spirometry      Current Oxygen Requirements: 3 LPM nasal cannula  Current SpO2: 94%    Re-evaluation date: 04/14/19    Patient Education: Discussed use and benefits of respiratory medications.      See \"RT Assessments\" flow sheet for patient assessment scoring and Acuity Level Details.               "

## 2019-04-11 NOTE — PHARMACY-ADMISSION MEDICATION HISTORY
Admission medication history interview status for this patient is complete. See [facility] admission navigator for allergy information, prior to admission medications and immunization status.     Medication history interview source(s):Caregiver  Medication history resources (including written lists, pill bottles, clinic record):[facility]  Primary pharmacy:[facility]    Changes made to PTA medication list:  Added: -  Deleted: -  Changed: -    Actions taken by pharmacist (provider contacted, etc):None     Additional medication history information:None    Medication reconciliation/reorder completed by provider prior to medication history? No    Do you take OTC medications (eg tylenol, ibuprofen, fish oil, eye/ear drops, etc)? Y    Prior to Admission medications    Medication Sig Last Dose Taking? Auth Provider   amitriptyline (ELAVIL) 100 MG tablet Take 1 tablet (100 mg) by mouth At Bedtime 4/9/2019 at 2000 Yes Julius Hassan MD   atorvastatin (LIPITOR) 10 MG tablet Take 1 tablet (10 mg) by mouth daily 4/10/2019 at 0800 Yes Julius Hassan MD   baclofen (LIORESAL) 10 MG tablet Take 10 mg by mouth 3 times daily 4/10/2019 at 1200 Yes Unknown, Entered By History   cholecalciferol 1000 units TABS Take 1,000 Units by mouth daily 4/10/2019 at 0800 Yes Unknown, Entered By History   furosemide (LASIX) 40 MG tablet Take 40 mg by mouth daily 4/10/2019 at 0800 Yes Reported, Patient   gabapentin (NEURONTIN) 300 MG capsule Take 600 mg by mouth 3 times daily 4/10/2019 at 1200 Yes Reported, Patient   guaiFENesin (MUCINEX) 600 MG 12 hr tablet Take 1 tablet (600 mg) by mouth 2 times daily 4/10/2019 at 0800 Yes Margaret Neal APRN CNP   hydrochlorothiazide (MICROZIDE) 12.5 MG capsule Take 12.5 mg by mouth daily 4/10/2019 at 0800 Yes Reported, Patient   ipratropium - albuterol 0.5 mg/2.5 mg/3 mL (DUONEB) 0.5-2.5 (3) MG/3ML neb solution Take 1 vial (3 mLs) by nebulization 4 times daily 4/10/2019 at 1600 Yes Margaret Neal APRN CNP    losartan (COZAAR) 50 MG tablet Take 1 tablet (50 mg) by mouth daily 4/10/2019 at 0800 Yes Drake Greco MD   metFORMIN (GLUCOPHAGE) 500 MG tablet Take 1 tablet (500 mg) by mouth daily (with dinner) 4/9/2019 at 1730 Yes Julius Hassan MD   metoprolol succinate (TOPROL-XL) 50 MG 24 hr tablet TAKE 1 TABLET BY MOUTH ONCE DAILY 4/10/2019 at 0800 Yes Julius Hassan MD   Multiple Vitamin (MULTI-VITAMIN PO) Take 1 tablet by mouth daily  4/10/2019 at 0800 Yes Reported, Patient   omega-3 fatty acids (FISH OIL) 1200 MG capsule Take 1 capsule by mouth daily. 4/10/2019 at 0800 Yes Reported, Patient   oxyCODONE (ROXICODONE) 5 MG tablet Take 7.5 mg by mouth every 6 hours as needed for severe pain 4/10/2019 at 1400 Yes Unknown, Entered By History   polyethylene glycol (MIRALAX/GLYCOLAX) packet Take 17 g by mouth daily 4/10/2019 at 0800 Yes Unknown, Entered By History   POTASSIUM CHLORIDE ER PO Take 20 mEq by mouth daily 4/10/2019 at 0800 Yes Reported, Patient   senna-docusate (SENOKOT-S/PERICOLACE) 8.6-50 MG tablet Take 3 tablets by mouth 2 times daily 4/10/2019 at 0800 Yes Mo Javed MD   baclofen (LIORESAL) 10 MG tablet Take 10 mg by mouth daily as needed for muscle spasms Unknown at Unknown time  Unknown, Entered By History   baclofen (LIORESAL) intraTHECAL Internal Pump by Intrathecal route continuous prn 3/18/19 - Managed by [facility], spoke to Jolene at [phone]  Pump contains baclofen 2000mcg/ml  Alarm date: 3/29/19  Dose-413mcg/day  Reservoir contains 2ml after the alarm date. Pump in place  Unknown, Entered By History   bisacodyl (DULCOLAX) 10 MG suppository Place 1 suppository (10 mg) rectally daily as needed for constipation Unknown at Unknown time  Mo Javed MD   docusate sodium (COLACE) 100 MG capsule Take 100 mg by mouth 2 times daily as needed for constipation Unknown at Unknown time  Reported, Patient   polyethylene glycol (MIRALAX/GLYCOLAX) packet Take 1 packet by  mouth 2 times daily as needed for constipation Unknown at Unknown time  Unknown, Entered By History

## 2019-04-11 NOTE — H&P
[facility]    History and Physical - Hospitalist Service       Date of Admission:  4/10/2019    Assessment & Plan   Amanda Richardson is a 53 year old female admitted on 4/10/2019 for concern of sepsis.     Leukocytosis, source of infection unclear  --multiple potential sources of infection but current source unclear.  WBC to 20K associated with malaise, URI symptoms, increased LE edema and redness.  Also recent admissions for urosepsis related to renal stone, now removed and stent also removed since last hospitalization.  Review of previous cultures reveal E coli in the urine and more recently VRE (not felt to be pathogen) and Pseudomonas.  No evidence of sepsis at this time and lactate is normal.  CXR with possible infiltrate; her symptoms are more consistent with URI than pneumonia.  Decubitus ulcer does not appear infected currently.  LE edema and redness more likely venous stasis than cellulitis    PLAN:  --change antibiotics to zosyn to cover more broadly and to include coverage of pseudomonas for possible urinary source  --repeat CBC in AM  --continue IVF  --follow blood cultures, urine culture, clinical course  --if were to decompensate, consider adding linezolid given recent VRE in urine  --check post void residual, high risk for urinary retention with underlying MS    Chronic venous stasis  --does not appear infected at present.  Reports negative DVT US at TCU within the past few days  --consider ACE wraps for edema reduction  --hold furosemide and hydrochlorothiazide in setting of concern for active infection and dehydration    Sacral decubitus ulcer, present on admission  --does not appear infected on examination.  Consult wound RN for dressing recommendations  --offload      Multiple sclerosis: baclofen pump in situ, reports refilled within the past 2 weeks.  Continue PTA PO baclofen  Chronic pain syndrome: continue PTA neurontin, PRN oxycodone, amitriptyline    Diabetes mellitus type  2  --controlled on PO meds.  Hold metformin.  Check glucose QID AC, low dose SSI    Hypertension: currently normotensive, hold losartan, hydrochlorothiazide.  Continue Toprol XL given recent atrial flutter (during previous hospitalization for septic shock)    Probable obesity hypoventilation: note elevated bicarbonate on BMP.  Higher risk for CO2 retention.  Not on CPAP or BiPAP.  Monitor O2 sats, wean O2.    Chronic constipation: miralax,senna as per PTA regimen    Diet: diabetic diet  DVT Prophylaxis: enoxaparin SQ  Bobo Catheter: not present  Code Status: FULL, discussed with patient    Disposition Plan   Expected discharge: 2 - 3 days, recommended to transitional care unit once antibiotic plan established.  Entered: Heidi Mcelroy PA-C 04/10/2019, 9:48 PM     The patient's care was discussed with the Attending Physician, Dr. Jennings.    Heidi Mcelroy PA-C  [facility]    ______________________________________________________________________    Chief Complaint   Malaise, URI symptoms, leukocytosis    History is obtained from the patient    History of Present Illness   Amanda Richardson is a 53 year old female who has extensive past medical history including recent hospitalizations for septic shock related to urinary source in setting of infected renal stone, multiple sclerosis, and diabetes admitted for malaise and leukocytosis.  Patient has been recovering in a TCU for the past few months following a hospitalization for septic shock due to urinary source from infected kidney stone.  She has since had management of stone and subsequent stent removal 3/27/2019.  She has most recently been treated for Pseudomonas UTI.      Upon my evaluation, patient admits to stuffy nose, dry cough for past few days as well as low grade fever to 100 degrees.  Denies dyspnea.  Denies dysuria, hematuria.  Denies drainage from sacral wound.  No diarrhea.  LE are more swollen and red but reports a negative DVT  US in the past few days.    In ED, leading differential was UTI vs. Cellulitis.  Patient received ceftriaxone and IVF and admitted for further evaluation and management.    Review of Systems    The 10 point Review of Systems is negative other than noted in the HPI or here.     Past Medical History    I have reviewed this patient's medical history and updated it with pertinent information if needed.   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  Past Surgical History:   Procedure Laterality Date     COLONOSCOPY N/A  2015    tubular adenomas x 2 - due 5 yrs     COMBINED CYSTOSCOPY, RETROGRADES, URETEROSCOPY, INSERT STENT Left 2019    Procedure: 1. Cystoscopy 2. LEFT retrograde pyelogram 3. LEFT JJ stent placement 4. <1hr physician fluoroscopy time;  Surgeon: Epifanio Sapp MD;  Location: [facility] OR     COMBINED CYSTOSCOPY, RETROGRADES, URETEROSCOPY, LASER HOLMIUM LITHOTRIPSY URETER(S), INSERT STENT Left 3/15/2019    Procedure: Cystoscopy, left ureteral stent exchange, left retrograde pyelogram, interpretation of fluoroscopic images, left ureteroscopy with holmium lithotripsy and stone basketing, 22 modifier for difficult lengthy case.;  Surgeon: Mayito Chauhan MD;  Location: [facility] OR     CYSTOSCOPY, REMOVE STENT(S), COMBINED Left 3/27/2019    Procedure: Flexible cystoscopy with left ureteral stent removal;  Surgeon: Mayito Chauhan MD;  Location: [facility] OR     FUSION LUMBAR ANTERIOR, FUSION LUMBAR POSTERIOR TWO LEVELS, COMBINED  10/17/2013    lumbar fusion - Dr Floyd     INSERT PUMP BACLOFEN  2017    intrathecal baclofen pump implantation     IRRIGATION AND DEBRIDEMENT LOWER EXTREMITY, COMBINED Right     Right ankle I&D d/t infection     OPEN REDUCTION INTERNAL FIXATION ANKLE  5/15    Right Bimalleolar ankle fx ORIF     OPEN REDUCTION INTERNAL FIXATION ANKLE Right 2015    Revision due to spasms pulling screws out of ankle     SINUS SURGERY      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  Social History     Tobacco Use     Smoking status: Former Smoker     Packs/day: 0.50     Types: Cigarettes     Last attempt to quit: 2013     Years since quittin.6     Smokeless tobacco: Never Used     Tobacco comment: since age 17   Substance Use Topics     Alcohol use: No     Drug use: No        Family History   I have reviewed this patient's family history and updated it with pertinent information if needed.   Family History   Problem Relation Age of Onset     C.A.D. Father         with CHF      Cancer Father         ? unsure type - abdominal      Hypertension Mother      Thyroid Disease Mother         goiter      Breast Cancer Sister 58     Thyroid Disease Son      Cancer - colorectal No family hx of        Prior to Admission Medications   Prior to Admission Medications   Prescriptions Last Dose Informant Patient Reported? Taking?   Multiple Vitamin (MULTI-VITAMIN PO) 4/10/2019 at 0800  Yes Yes   Sig: Take 1 tablet by mouth daily    POTASSIUM CHLORIDE ER PO 4/10/2019 at 0800  Yes Yes   Sig: Take 20 mEq by mouth daily   amitriptyline (ELAVIL) 100 MG tablet 4/9/2019 at 2000  No Yes   Sig: Take 1 tablet (100 mg) by mouth At Bedtime   atorvastatin (LIPITOR) 10 MG tablet 4/10/2019 at 0800  No Yes   Sig: Take 1 tablet (10 mg) by mouth daily   baclofen (LIORESAL) 10 MG tablet 4/10/2019 at 1200  Yes Yes   Sig: Take 10 mg by mouth 3 times daily   baclofen (LIORESAL) 10 MG tablet Unknown at Unknown time  Yes No   Sig: Take 10 mg by mouth daily as needed for muscle spasms   baclofen (LIORESAL) intraTHECAL Internal Pump Pump in place  Yes No   Sig: by Intrathecal route continuous prn 3/18/19 - Managed by [facility], spoke to Jolene at [phone]  Pump contains baclofen 2000mcg/ml  Alarm date: 3/29/19  Dose-413mcg/day  Reservoir contains 2ml after the alarm date.   bisacodyl (DULCOLAX) 10 MG suppository Unknown at Unknown time  No No   Sig: Place 1 suppository (10 mg) rectally daily as needed for constipation   cholecalciferol 1000 units TABS 4/10/2019 at 0800  Yes Yes   Sig: Take 1,000 Units by mouth daily   docusate sodium (COLACE) 100 MG capsule Unknown at Unknown time  Yes No   Sig: Take 100 mg by mouth 2 times daily as needed for constipation   furosemide (LASIX) 40 MG tablet 4/10/2019 at  0800  Yes Yes   Sig: Take 40 mg by mouth daily   gabapentin (NEURONTIN) 300 MG capsule 4/10/2019 at 1200  Yes Yes   Sig: Take 600 mg by mouth 3 times daily   guaiFENesin (MUCINEX) 600 MG 12 hr tablet 4/10/2019 at 0800  No Yes   Sig: Take 1 tablet (600 mg) by mouth 2 times daily   hydrochlorothiazide (MICROZIDE) 12.5 MG capsule 4/10/2019 at 0800  Yes Yes   Sig: Take 12.5 mg by mouth daily   ipratropium - albuterol 0.5 mg/2.5 mg/3 mL (DUONEB) 0.5-2.5 (3) MG/3ML neb solution 4/10/2019 at 1600  No Yes   Sig: Take 1 vial (3 mLs) by nebulization 4 times daily   losartan (COZAAR) 50 MG tablet 4/10/2019 at 0800  No Yes   Sig: Take 1 tablet (50 mg) by mouth daily   metFORMIN (GLUCOPHAGE) 500 MG tablet 4/9/2019 at 1730  No Yes   Sig: Take 1 tablet (500 mg) by mouth daily (with dinner)   metoprolol succinate (TOPROL-XL) 50 MG 24 hr tablet 4/10/2019 at 0800  No Yes   Sig: TAKE 1 TABLET BY MOUTH ONCE DAILY   omega-3 fatty acids (FISH OIL) 1200 MG capsule 4/10/2019 at 0800  Yes Yes   Sig: Take 1 capsule by mouth daily.   oxyCODONE (ROXICODONE) 5 MG tablet 4/10/2019 at 1400  Yes Yes   Sig: Take 7.5 mg by mouth every 6 hours as needed for severe pain   polyethylene glycol (MIRALAX/GLYCOLAX) packet 4/10/2019 at 0800  Yes Yes   Sig: Take 17 g by mouth daily   polyethylene glycol (MIRALAX/GLYCOLAX) packet Unknown at Unknown time  Yes No   Sig: Take 1 packet by mouth 2 times daily as needed for constipation   senna-docusate (SENOKOT-S/PERICOLACE) 8.6-50 MG tablet 4/10/2019 at 0800  No Yes   Sig: Take 3 tablets by mouth 2 times daily      Facility-Administered Medications: None     Allergies   Allergies   Allergen Reactions     Lisinopril      Lip swelling     Cyclobenzaprine Other (See Comments)     Per 4-10-17 H&P by Yanna Dugan PA-C.     Flexeril [Cyclobenzaprine Hcl]      Got confused        Physical Exam   Vital Signs: Temp: 98.9  F (37.2  C) Temp src: Oral BP: 120/63 Pulse: 78 Heart Rate: 92 Resp: 16 SpO2: 96 % O2 Device: Nasal  cannula Oxygen Delivery: 2 LPM  Weight: 0 lbs 0 oz    Constitutional: chronically ill appearing woman resting in bed  Eyes: pupils equal, reactive  HEENT: dry mucous membranes, no thrush, no exudates  Respiratory: no wheezing, rhonchi, or rales  Cardiovascular: RRR, no murmur  GI: normoactive bowel sounds, soft, nontender  Lymph/Hematologic: no bruising  Genitourinary: no catheter  Skin: sacral decubitus ulcer with adherent fibrinous material, no drainage, surrounding erythema, or fluctuance.    Bilateral LE: RLE >LLE swelling, 2-3+ and with diffuse erythema below the knee, no warmth.   Musculoskeletal: decreased muscle bulk  Neurologic: CN II-XII intact  Psychiatric: alert, oriented, appropriate.         Data   Data reviewed today: I reviewed all medications, new labs and imaging results over the last 24 hours. I personally reviewed the chest xray demonstrating possible infiltrate.     Recent Labs   Lab 04/10/19  1855   WBC 20.6*   HGB 9.5*   MCV 85         POTASSIUM 3.5   CHLORIDE 96   CO2 36*   BUN 23   CR 0.83   ANIONGAP 1*   MARY 8.2*   *   ALBUMIN 2.4*   PROTTOTAL 8.5   BILITOTAL 0.3   ALKPHOS 143   ALT 22   AST 12     Recent Results (from the past 24 hour(s))   Chest XR,  PA & LAT    Narrative    XR CHEST 2 VW   4/10/2019 7:29 PM     HISTORY: cough    COMPARISON: Film dated 3/17/2019    FINDINGS: Cardiac silhouette is enlarged..  There is a small patchy  opacity at the right lung base. This would be compatible with a small  area of infiltrate or atelectasis. The upper lung zones are clear. The  pulmonary vasculature is normal.  The bones and soft tissues are  unremarkable.      Impression    IMPRESSION:   1. Mildly prominent cardiac silhouette.  2. Small opacity at the right lung base compatible with a small area  of infiltrate or atelectasis.        MURTAZA FREITAS MD

## 2019-04-11 NOTE — CONSULTS
JOAQUIN ELEVATED.  Pt readmitted from [facility] TCU.  Pt was hospitalized 3/16 to 3/26 with septic shock and also 1/30 to 2/15 with Septic shock.  Pt has a history of MS.  Her previous baseline was that she was able to pivot.  Pt can from TCU.  SW leading.  Will follow along for needs.  Will give hand off to clinic RN CTS at time of discharge.  Mitzi RN CTS [phone]

## 2019-04-11 NOTE — PLAN OF CARE
Pt vitals stable overnight. 3L  supplemental O2 to maintain saturations >90%. Pt lethargic until about 0630 am, now much more alert. Prn oxycodone x1 this AM for pain. Staff turning/repositioning her every 2 hours. Bladder scan and straight cath x2 (midnight and 0630 for 800cc odorous urine each time. Pt has mepilex to L buttock, pressure injury from prior to admission. Redness to BLE within outlines, elevated on pillows. Pt on IVF, zosyn, mod carb diet, is a lift and requires assistance with all body cares.

## 2019-04-11 NOTE — ED NOTES
[facility]  ED Nurse Handoff Report    Amanda Richardson is a 53 year old female   ED Chief complaint: Cellulitis  . ED Diagnosis:   Final diagnoses:   Urinary tract infection with hematuria, site unspecified   Sepsis, due to unspecified organism (H)   Upper respiratory tract infection, unspecified type   Cellulitis of lower extremity, unspecified laterality   Generalized muscle weakness     Allergies:   Allergies   Allergen Reactions     Lisinopril      Lip swelling     Cyclobenzaprine Other (See Comments)     Per 4-10-17 H&P by Yanna Dugan PA-C.     Flexeril [Cyclobenzaprine Hcl]      Got confused        Code Status: Full Code  Activity level - Baseline/Home:  Independent. Activity Level - Current:   Stand with Assist. Lift room needed: No. Bariatric: No   Needed: No   Isolation: No. Infection: Not Applicable.     Vital Signs:   Vitals:    04/10/19 1945 04/10/19 2000 04/10/19 2012 04/10/19 2015   BP:   121/69 144/51   Pulse:   91 (!) 43   Resp:       Temp:       TempSrc:       SpO2: 94% 90% 98% 90%       Cardiac Rhythm:  ,      Pain level:    Patient confused: No. Patient Falls Risk: Yes.   Elimination Status: Has voided   Patient Report - Initial Complaint: Arrives via EMS from TCU [facility].  Recently admitted for sepsis, left uretal stone removed.  Hx cellulitis, pressure wound to sacral area.  Recent cough started on Friday productive, CXR negative, UTI.  Fever at TCU, WBC's were elevated.  Was 88% on RA, placed on O2 at 2L.  IJ=985 en route.  Sent to r/o sepsis. Afebrile here.  ABCD's intact; A/o x 4.     . Focused Assessment: cough, wound to sacral area, cellulitis of left lower leg,   Tests Performed: labs, imaging, . Abnormal Results:   Labs Ordered and Resulted from Time of ED Arrival Up to the Time of Departure from the ED   CBC WITH PLATELETS DIFFERENTIAL - Abnormal; Notable for the following components:       Result Value    WBC 20.6 (*)     RBC Count 3.59 (*)     Hemoglobin  9.5 (*)     Hematocrit 30.5 (*)     MCHC 31.1 (*)     RDW 16.9 (*)     Absolute Neutrophil 16.0 (*)     All other components within normal limits   COMPREHENSIVE METABOLIC PANEL - Abnormal; Notable for the following components:    Carbon Dioxide 36 (*)     Anion Gap 1 (*)     Glucose 118 (*)     Calcium 8.2 (*)     Albumin 2.4 (*)     All other components within normal limits   ROUTINE UA WITH MICROSCOPIC - Abnormal; Notable for the following components:    Blood Urine Moderate (*)     Protein Albumin Urine 20 (*)     Leukocyte Esterase Urine Large (*)     WBC Urine 71 (*)     RBC Urine 41 (*)     WBC Clumps Present (*)     Bacteria Urine Few (*)     Yeast Urine Few (*)     Mucous Urine Present (*)     All other components within normal limits   LACTIC ACID WHOLE BLOOD   PULSE OXIMETRY NURSING   CARDIAC CONTINUOUS MONITORING   MEASURE URINE OUTPUT   PATIENT CARE ORDER   URINE CULTURE AEROBIC BACTERIAL   BLOOD CULTURE   INFLUENZA A/B ANTIGEN   BLOOD CULTURE     Chest XR,  PA & LAT   Preliminary Result   IMPRESSION:    1. Mildly prominent cardiac silhouette.   2. Small opacity at the right lung base compatible with a small area   of infiltrate or atelectasis.            .   Treatments provided: fluids, abx  Family Comments: none present  OBS brochure/video discussed/provided to patient:  No  ED Medications:   Medications   lactated ringers BOLUS 1,000 mL (1,000 mLs Intravenous New Bag 4/10/19 1904)     Followed by   lactated ringers infusion (has no administration in time range)   cefTRIAXone (ROCEPHIN) 2 g vial to attach to  ml bag for ADULTS or NS 50 ml bag for PEDS (2 g Intravenous New Bag 4/10/19 2027)     Drips infusing:  Yes  For the majority of the shift, the patient's behavior Green. Interventions performed were none.     Severe Sepsis OR Septic Shock Diagnosis Present: No      ED Nurse Name/Phone Number: Maida AHLE Rory,   8:49 PM    RECEIVING UNIT ED HANDOFF REVIEW    Above ED Nurse Handoff Report  was reviewed: Yes  Reviewed by: Merline Bryan on April 10, 2019 at 8:59 PM

## 2019-04-11 NOTE — CONSULTS
ID consult dictated IMP 1 54 yo female recent obstruction /urosepsis, stents out abnormal UA, fever, resp sxs bilat red legs, ???    REc get [facility] result, await cxs , zosyn for now

## 2019-04-11 NOTE — CONSULTS
Consult Date:  04/11/2019      INFECTIOUS DISEASE CONSULTATION      REQUESTING PHYSICIAN:  Nii Jennings MD      IMPRESSION:   1.  A 53-year-old female well known to me from recent hospitalization, admitted from [facility], onset of fever, chills and leukocytosis at nursing home with etiology unclear.  Numerous possible issues, has abnormal urine currently and history of urine infections (see below), respiratory symptoms including some shortness of breath, nasal congestion and sore throat, large worsening decubitus wound with drainage, bilateral leg erythema, current cultures are pending.   2.  Abnormal urinalysis, now in a nursing home, apparent positive culture, but not available in our system, get the outside culture and see what current urine has shown, had recent major urine infection with obstruction which has been relieved, off antibiotics currently, had vancomycin-resistant enterococcus and Pseudomonas in those cultures.   3.  Worsening decubitus wound, some drainage, possible source of infection.   4.  Respiratory symptoms, question upper respiratory infection, possibly some infiltrates, negative influenza screening and clinical picture not suggesting that.   5.  Multiple sclerosis.   6.  Diabetes mellitus.   7.  Chronic stasis changes in both legs, bilateral erythema.  Doubt this is cellulitis.   8.  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      RECOMMENDATIONS:   1.  Zosyn as broad coverage for now.   2.  Await current cultures, get outside urine culture, follow clinical course and readjust accordingly.   3.  Decubitus wound seems problematic.  Surgery to evaluate, not clear whether it is the cause of the fever unless she is bacteremic.      HISTORY:  This is a 53-year-old female seen in consultation due to fever and leukocytosis.  The patient is well known to us.  Several weeks ago she had an obstructive uropathy and urine infection with pseudomonas  and also had VRE in the urine.  She got a course of daptomycin for that, as well as an extended course for the pseudomonas.  It was quinolone sensitive, so got a course of quinolones.  Off antibiotics for that for the last 2+ weeks.  She then began developing respiratory symptoms last week and with that, some low-grade fever and was found to have leukocytosis.  Imaging did not show clearcut pneumonia, but was started on Levaquin.  She also had an abnormal urinalysis, the a result of which we do not have in our system (at [facility], so a bit puzzled by this).  The patient was then admitted with all these symptoms continuing.  In addition, she has bilateral leg erythema where she has had venous stasis historically.  She also has a worsening decubitus wound.  From a symptom standpoint, she is mainly describing respiratory symptoms at present.      PAST MEDICAL HISTORY:  Recurrent urine infections and a history of VRE and MRSA.  History of obstructive uropathy without any stents in at present.  History of some respiratory infections in the past, but no major aspiration.  Ongoing decubitus wound not felt infected historically.  Bilateral venous stasis disease, active at present.      SOCIAL AND FAMILY HISTORY:  Extensive health care contact recently, and known MRSA and VRE colonization.  No active alcohol or tobacco use.      MEDICATIONS:  As listed.      REVIEW OF SYSTEMS:  Describing mainly a cough, slight sore throat, some leg discomfort but not notable.      PHYSICAL EXAMINATION:   GENERAL:  The patient appears her usual self.  Cognition seems intact.  No significant tachycardia.   VITAL SIGNS:  Currently afebrile at present, but 99.8 earlier.   HEENT:  No thrush or intraoral lesions.  Pupils reactive.  Throat unremarkable.   NECK:  Supple and nontender, a few minor lymph nodes.   HEART:  Slightly tachycardic in the 90s, no major murmur.   LUNGS:  Crackles, slight wheezing in the right lateral lung field.   ABDOMEN:   Soft, nontender, no flank tenderness.   EXTREMITIES:  Bilateral erythema, slightly warm to the touch, but does not look like cellulitis, more stasis changes.  Decubitus wound in the buttocks, but did not roll her.  She was having a hard time moving currently, but per nursing, it looks somewhat problematic and draining.   NEUROLOGIC:  Findings compatible with her known MS.      LABORATORY DATA:  Blood cultures pending.  Urine grossly abnormal.  Currently urine culture so far negative.      Thank you very much for the consultation.  I will follow the patient with you.         RICKY RAMOS MD             D: 2019   T: 2019   MT: GEOVANNI      Name:     JIMMY SYLVESTER   MRN:      [id number]        Account:       [id number]   :      1965           Consult Date:  2019      Document: [id number]       cc: Julius Hassan MD

## 2019-04-11 NOTE — CONSULTS
CLINICAL NUTRITION SERVICES  -  ASSESSMENT NOTE      Recommendations Ordered by Registered Dietitian (RD):  - Continue diet, per MD  - Strawberry Boost BID (10am + 2pm)  - Begin multivitamin    Malnutrition:   % Weight Loss:  None noted  % Intake:  No decreased intake noted   Subcutaneous Fat Loss:  None observed  Muscle Loss:  None observed - baseline adiposity potentially masking   Fluid Retention:  +4 BLE edema - could be masking weight loss     Malnutrition Diagnosis: Patient does not meet two of the above criteria necessary for diagnosing malnutrition        REASON FOR ASSESSMENT  Amanda Richardson is a 53 year old female seen by Registered Dietitian for Admission Nutrition Risk Screen for stageable pressure injuries or large/non-healing wound or burn    PMH: multiple sclerosis now wheelchair-dependent, chronic kidney disease, type 2 diabetes mellitus, NH-lymphoma.  Admitted for concern of sepsis.        NUTRITION HISTORY  - Information obtained from chart review and visit with pt  - Currently resides at [facility]; was living in nursing home before this  - Was recently admitted at [facility] and followed by RDs 1/31 - 2/18 - was intubated and receiving EN for a week of this time   - Pt reports she has overall had good appetite and intake the past few weeks  - Diet PTA: typically 3 meals/day   Breakfast: breakfast sandwich, or skip   Lunch/Dinners: a variety of things - fish, chicken, beef, etc.  -  Pt reports that her  and son prepare meals for her usually      CURRENT NUTRITION ORDERS  Diet Order:     Mod Consistent Carbohydrate     Current Intake/Tolerance:  - Pt reports her appetite is good.   - Had an omelet, English muffin, pineapple and milk for breakfast this am       NUTRITION FOCUSED PHYSICAL ASSESSMENT (NFPA)  Completed:  Yes Partial assessment upper body only         Observed:    No nutrition-related physical findings observed    Obtained from Chart/Interdisciplinary Team:  -Edema+4 BLE  -Pt has  "some baseline redness to her bilateral lower extremities  -WOCN 4/11: unstageable pressure injury to L lower buttocks  -Wheelchair bound      ANTHROPOMETRICS  Height: 5'7\"  Weight: 123.3 kg (271 lbs)  BMI: 42.4   Weight Status:  Obesity Grade III BMI >40  IBW: 61.4 kg (135 lbs)  % IBW: 200%  Weight History: Wt has remained relatively stable. Wt outlier on 3/19 - fluids or unreliable recording?   Wt Readings from Last 10 Encounters:   04/10/19 114.8 kg (253 lb)   04/09/19 114.8 kg (253 lb)   04/04/19 112.3 kg (247 lb 9.6 oz)   03/31/19 112.5 kg (248 lb 1.6 oz)   03/27/19 112.9 kg (249 lb)   03/27/19 113.4 kg (250 lb)   03/19/19 123.3 kg (271 lb 12.8 oz)   03/15/19 112.9 kg (249 lb)   03/13/19 113.8 kg (250 lb 12.8 oz)   03/08/19 113.2 kg (249 lb 9.6 oz)       LABS  Labs reviewed  A1C <7 consistently    MEDICATIONS  Medications reviewed  Sliding scale insulin  LR @ 125 ml/hr   Senokot BID   Vitamin D cholecalciferol 1000 units       ASSESSED NUTRITION NEEDS PER APPROVED PRACTICE GUIDELINES:    Dosing Weight:  77 kg (adjusted) - based on driest/only wt since admit on 4/10 with IBW of 61.4 kg   Estimated Energy Needs: 2310 - 2695 kcals (30-35 Kcal/Kg)  Justification: increased needs - wound healing, consideration of wheelchair-bound status  Estimated Protein Needs: 116 - 154+ grams protein (1.5 - 2+ g pro/Kg)  Justification: wound healing, obesity guidelines and preservation of lean body mass  Estimated Fluid Needs: 1925 - 2310  mL (1 mL/Kcal)  Justification: maintenance      NUTRITION DIAGNOSIS:  Increased nutrient needs (protein) related to wound healing as evidenced by unstageable pressure injury to L lower buttocks, and assessed needs as above.       NUTRITION INTERVENTIONS  Recommendations / Nutrition Prescription  - Continue diet, per MD  - Strawberry Boost BID (10am + 2pm)  - Begin multivitamin  - if intakes decline would recommend PI protocol for unstageable PI    Implementation  Nutrition education: Provided " education on increased protein needs for wound healing.     Medical Food Supplement/Multivitamin + Mineral: as above.     Collaboration and Referral of Care - discussed pt during IDT rounds.       Nutrition Goals  Pt to consume >75% of meal trays TID, and >/=1 oral supplement per day.       MONITORING AND EVALUATION:  Progress towards goals will be monitored and evaluated per protocol and Practice Guidelines      Coby Lemons, Dietetic Intern

## 2019-04-11 NOTE — PROGRESS NOTES
I called [facility] [phone] to request the UA/UC.  The UC isn't back yet, but they will fax the UA now.  Mitzi YOO CTS [phone]

## 2019-04-11 NOTE — PLAN OF CARE
Max temp 101.4 and received tylenol, tachycardic all shift, encouraged to use IS every 2 hours and frequent non prod cough, oxygen at 3L, pain not controlled on 7.5 mg of oxycodone and spoke with MD who increased dose to 15mg with reminders to watch for lethargy. WOC consult completed and new dressing change orders completed, ramos placed d/t retention had been st cath previously x2, large pressure wound chronic and surgery consulted now to see patient to see if surgical intervention on wound is needed, up with lift for 1-2 hours in chair

## 2019-04-11 NOTE — PROGRESS NOTES
[facility]    Medicine Progress Note - Hospitalist Service       Date of Admission:  4/10/2019  Date of Service: 04/11/2019    Assessment & Plan     Amanda Richardson is a 53 year old female admitted on 4/10/2019 for concern of sepsis. She has underlying hx of MS, chronic pain with a pain pump in place. Also recent admissions for urosepsis related to renal stone, now removed and stent also removed since last hospitalization.  Review of previous cultures reveal E coli in the urine and more recently VRE (not felt to be pathogen) and Pseudomonas. Presented to the hospital this time with fevers, tachycardia, malaise, URI symptoms.      # Sepsis of unclear infectious source. Multiple potential sources of infection. WBC to 20K associated with malaise, URI symptoms, increased LE edema and redness. Lactate is normal and BP is stable. CXR with possible infiltrate; her symptoms are more consistent with URI than pneumonia. She also has a chronic decubitus ulcer which could be a source. LE edema and redness more likely venous stasis than cellulitis, although all potential sources.      -- Continue IV Piperacillin/Tazobactam  -- Await blood culture results, urine cultures. Also trying to obtain recent UC from SNF  -- Patient seen by [facility] nurse. Surgery to evaluate regarding the decubitus ulcer although unclear if there is an easy intervention, will need long term good wound care and fup at [facility] healing institute.   --continue IVF  --if were to decompensate, consider adding linezolid given recent VRE in urine    # Urinary retention. Patient reports having ramos intermittently in the past. Having high PVR and requiring straight cath. Given her significant skin issues and urinary retention, will place ramos for now. Should fup with urology as outpatient      # Chronic venous stasis. At risk for cellulitis   --does not appear infected at present.  Reports negative DVT US at TCU within the past few days  --hold  furosemide and hydrochlorothiazide in setting of concern for active infection and dehydration     # Sacral decubitus ulcer, present on admission  --WOC consult, off load, otherwise treatment as above, fup at [facility]       # Multiple sclerosis: baclofen pump in situ, reports refilled within the past 2 weeks.  Continue PTA PO baclofen  # Chronic pain syndrome: continue PTA neurontin, PRN oxycodone, amitriptyline  -- Patient reports that she has been on oxycodone 15 mg q6h prn for many years although recently it was decreased in half. She is quiet concerned about this and reports uncontrolled pain, wants to have her regular dose. Will increase back to baseline dose, however, watch closely for signs of lethargy or excessive somnolence      # Diabetes mellitus type 2  --controlled on PO meds.  Hold metformin.  Check glucose QID AC, low dose SSI     # Hypertension: currently normotensive, hold losartan, hydrochlorothiazide.  Continue Toprol XL  # Probable obesity hypoventilation. Monitor oxygenation and signs of oversedation   # Chronic constipation: miralax,senna as per PTA regimen     Diet: Combination Diet Regular Diet Adult; 2136-4700 Calories: Moderate Consistent CHO (4-6 CHO units/meal)  Snacks/Supplements Adult: Boost Glucose Control; Between Meals    DVT Prophylaxis: Enoxaparin (Lovenox) SQ  Bobo Catheter: not present  Code Status: Full Code      Disposition Plan   Expected discharge: 4 - 7 days, recommended to transitional care unit once SIRS/Sepsis treated.  Entered: Lyric Brandon MD 04/11/2019, 4:03 PM       The patient's care was discussed with the Bedside Nurse and Patient.    Lyric Brandon MD  Hospitalist Service  [facility]    ______________________________________________________________________    Interval History   Chart reviewed and patient seen. Case discussed with nursing staff.     Patient feels better, still having intermittent fevers, denies any chest pain, shortness of breath.  Some cough, cold and congestion symptoms. No reports of abdominal pain or vomiting. Having issues with retention. No new complaints voiced otherwise.       Data reviewed today: I reviewed all medications, new labs and imaging results over the last 24 hours. I personally reviewed    Physical Exam   Vital Signs: Temp: 101.4  F (38.6  C) Temp src: Oral BP: 139/60 Pulse: 117 Heart Rate: 104 Resp: 18 SpO2: 92 % O2 Device: Nasal cannula Oxygen Delivery: (S) 2 LPM  Weight: 0 lbs 0 oz    GENERAL:  Awake and alert, chronically ill appearing  PSYCH: appropriate affect, no acute agitation   HEENT:  Neck is Supple, trachea is midline, EOMI, conjunctiva clear  CARDIOVASCULAR: Regular rate and rhythm, Normal S1, S2, no loud murmurs, no rubs or gallops.   PULMONARY:  Clear to auscultation bilaterally. Good air entry on both sides  GI: Abdomen is soft, non tender, non-distended, bowel sounds present. No rebound or guarding   SKIN:  No cyanosis or clubbing, sacral decub ulcer, redness on legs appears more likely chronic venous stasis    MSK: Extremities are warm and well perfused. No pitting edema   Neuro: Awake and oriented x 3. Generalized weakness     Data   Recent Labs   Lab 04/11/19  0758 04/10/19  1855   WBC 15.2* 20.6*   HGB 9.3* 9.5*   MCV 87 85    317    133   POTASSIUM 3.7 3.5   CHLORIDE 99 96   CO2 35* 36*   BUN 16 23   CR 0.64 0.83   ANIONGAP 3 1*   MARY 8.2* 8.2*   * 118*   ALBUMIN  --  2.4*   PROTTOTAL  --  8.5   BILITOTAL  --  0.3   ALKPHOS  --  143   ALT  --  22   AST  --  12       Recent Results (from the past 24 hour(s))   Chest XR,  PA & LAT    Narrative    XR CHEST 2 VW   4/10/2019 7:29 PM     HISTORY: cough    COMPARISON: Film dated 3/17/2019    FINDINGS: Cardiac silhouette is enlarged..  There is a small patchy  opacity at the right lung base. This would be compatible with a small  area of infiltrate or atelectasis. The upper lung zones are clear. The  pulmonary vasculature is normal.  The  bones and soft tissues are  unremarkable.      Impression    IMPRESSION:   1. Mildly prominent cardiac silhouette.  2. Small opacity at the right lung base compatible with a small area  of infiltrate or atelectasis.        MURTAZA FREITAS MD     Medications     lactated ringers       lactated ringers 125 mL/hr at 04/11/19 1523       amitriptyline  100 mg Oral At Bedtime     atorvastatin  10 mg Oral Daily     baclofen  10 mg Oral TID     enoxaparin  40 mg Subcutaneous Q24H     gabapentin  600 mg Oral TID     insulin aspart  1-3 Units Subcutaneous TID AC     insulin aspart  1-3 Units Subcutaneous At Bedtime     ipratropium - albuterol 0.5 mg/2.5 mg/3 mL  1 vial Nebulization 4x Daily     metoprolol succinate ER  50 mg Oral Daily     multivitamin w/minerals  1 tablet Oral Daily     piperacillin-tazobactam  3.375 g Intravenous Q6H     senna-docusate  3 tablet Oral BID     sodium hypochlorite   Topical BID

## 2019-04-11 NOTE — CONSULTS
Chief complaint:  Left buttock drainage    HPI:  This patient is a 53 year old female with multiple sclerosis who was recently admitted to the hospital with sepsis.  She is now again admitted with fever and probable infection.  She has had progression of the pre-existing pressure ulcer on her left buttocks since her recent hospitalization.  I am asked to evaluate this for possible debridement.      Past Medical History:   has a past medical history of Abnormality of gait (7/27/2012), Arrhythmia, Chronic pain, CKD (chronic kidney disease) stage 1, GFR 90 ml/min or greater, Colon polyps (1/15), Gallstone (6/11/2012), Hyperlipidemia LDL goal <70, Hypertension goal BP (blood pressure) < 140/90, Leukocytosis (6/11/2012), Moderate depressive episode (H), MS (multiple sclerosis) (H) (2003), Multiple sclerosis (H), Non Hodgkin's lymphoma (H) (06/11/2012), Nonallopathic lesion of cervical region, not elsewhere classified (9/24/2012), Nonallopathic lesion of thoracic region, not elsewhere classified (9/24/2012), Numbness and tingling, Obesity (6/11/2012), Other chronic pain, Pain in joint, pelvic region and thigh (7/20/2012), Prediabetes, Spinal stenosis, lumbar (6/17/2012), T-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    Past Surgical History:  Past Surgical History:   Procedure Laterality Date     COLONOSCOPY N/A 1/7/2015    tubular adenomas x 2 - due 5 yrs     COMBINED CYSTOSCOPY, RETROGRADES, URETEROSCOPY, INSERT STENT Left 1/30/2019    Procedure: 1. Cystoscopy 2. LEFT retrograde pyelogram 3. LEFT JJ stent placement 4. <1hr physician fluoroscopy time;  Surgeon: Epifanio Sapp MD;  Location: [facility] OR     COMBINED CYSTOSCOPY, RETROGRADES, URETEROSCOPY, LASER HOLMIUM LITHOTRIPSY URETER(S), INSERT STENT Left 3/15/2019    Procedure: Cystoscopy, left ureteral stent exchange, left retrograde pyelogram, interpretation of fluoroscopic images, left ureteroscopy with holmium  lithotripsy and stone basketing, 22 modifier for difficult lengthy case.;  Surgeon: Mayito Chauhan MD;  Location: [facility] OR     CYSTOSCOPY, REMOVE STENT(S), COMBINED Left 3/27/2019    Procedure: Flexible cystoscopy with left ureteral stent removal;  Surgeon: Mayito Chauhan MD;  Location: [facility] OR     FUSION LUMBAR ANTERIOR, FUSION LUMBAR POSTERIOR TWO LEVELS, COMBINED  10/17/2013    lumbar fusion - Dr Floyd     INSERT PUMP BACLOFEN  2017    intrathecal baclofen pump implantation     IRRIGATION AND DEBRIDEMENT LOWER EXTREMITY, COMBINED Right 2015    Right ankle I&D d/t infection     OPEN REDUCTION INTERNAL FIXATION ANKLE  5/15    Right Bimalleolar ankle fx ORIF     OPEN REDUCTION INTERNAL FIXATION ANKLE Right 2015    Revision due to spasms pulling screws out of ankle     SINUS SURGERY      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     XR LUMBAR EPIDURAL INJECTION INCL IMAGING  3/14    Left L4-5 Epidural Dr Winter        Social History:  Social History     Socioeconomic History     Marital status:      Spouse name: Fernando     Number of children: 3     Years of education: 14     Highest education level: Not on file   Occupational History     Employer: UNEMPLOYED   Social Needs     Financial resource strain: Not on file     Food insecurity:     Worry: Not on file     Inability: Not on file     Transportation needs:     Medical: Not on file     Non-medical: Not on file   Tobacco Use     Smoking status: Former Smoker     Packs/day: 0.50     Types: Cigarettes     Last attempt to quit: 2013     Years since quittin.6     Smokeless tobacco: Never Used     Tobacco comment: since age 17   Substance and Sexual Activity     Alcohol use: No     Drug use: No     Sexual activity: Yes     Partners: Male   Lifestyle     Physical activity:     Days per week: Not on file     Minutes per session: Not on file     Stress: Not on file   Relationships     Social connections:     Talks on  phone: Not on file     Gets together: Not on file     Attends Holiness service: Not on file     Active member of club or organization: Not on file     Attends meetings of clubs or organizations: Not on file     Relationship status: Not on file     Intimate partner violence:     Fear of current or ex partner: Not on file     Emotionally abused: Not on file     Physically abused: Not on file     Forced sexual activity: Not on file   Other Topics Concern     Parent/sibling w/ CABG, MI or angioplasty before 65F 55M? No      Service Not Asked     Blood Transfusions Not Asked     Caffeine Concern Yes     Comment: occas     Occupational Exposure Not Asked     Hobby Hazards Not Asked     Sleep Concern Not Asked     Stress Concern Not Asked     Weight Concern Not Asked     Special Diet Not Asked     Back Care Not Asked     Exercise Yes     Comment: as able     Bike Helmet Not Asked     Seat Belt Yes     Self-Exams Not Asked   Social History Narrative     Not on file        Family History:  Family History   Problem Relation Age of Onset     C.A.D. Father         with CHF      Cancer Father         ? unsure type - abdominal      Hypertension Mother      Thyroid Disease Mother         goiter      Breast Cancer Sister 58     Thyroid Disease Son      Cancer - colorectal No family hx of        Review of Systems:  The 10 point Review of Systems is negative other than noted in the HPI and above.    Physical Exam:  General - This is a debilitated female in no apparent distress.  HEENT - Normocephalic, atraumatic.  No scleral icterus.  Lungs -breathing is nonlabored  Extremities -bilateral edema with dependent erythema.  The buttock on the left side reveals a 4 x 6 cm area of full-thickness skin necrosis.  There is some drainage superiorly.  The depth of the wound is unclear due to the overlying necrotic skin.    Relevant labs:  White blood cell count 15,200    Bedside procedure note:  Diagnosis: Progressing pressure  ulcer  Procedure: Sharp incisional debridement.  Surgeon: Hemanth Card  Indications for operation: This is a patient with pre-existing pressure ulcer who presents with significant progression of this since her recent hospitalization.  I recommended debridement of the necrotic tissue.  We discussed the procedure, along with its potential risks, and the patient is agreed to proceed.    Details of the operation: The patient is rolled onto her side and the wound is approached.  Necrotic skin is sharply excised using a sterile scissors.  Obviously necrotic fat was cut away using the scissors as well.  We debrided the superficial tissue until this started to become uncomfortable for the patient.  Some necrotic tissue remains and will require further debridement.  Wound is packed using gauze and Dakin solution.        Assessment and Plan:  This is a patient with a pressure ulcer on her left buttock which has increased in size by report.  There is some undermining, but less than it initially appeared.  I have debrided the overlying skin and some of the necrotic fat.  The patient will likely require additional debridement moving forward.  Once discharged, she will need formal wound or plastic surgery follow-up.    Hemanth Card MD  [facility]

## 2019-04-11 NOTE — PLAN OF CARE
Up from ED at 2130.   Lethargic, oriented x4, Ax2 lift.   VSS, 3 L O2, afebrile, oxycodone and tylenol for pain.   LS diminished, denies SOB.   BS active x4, passing flatus.   BLE possible cellulitis, red, warm, 4+ edema, within marked borders.  Deep pressure ulcer to L lower buttocks--cleansed, new mepilex applied, WOC consulted.  LR at 125 ml/hr, zosyn q6h.   Will continue to monitor.

## 2019-04-11 NOTE — CONSULTS
Care Transition Initial Assessment - SW     Met with: Patient    Active Problems:    Leukocytosis       DATA  Lives With: spouse      Quality of Family Relationships: involved, supportive  Description of Support System: Supportive, Involved     Support Assessment: Adequate family and caregiver support, Adequate social supports.   Identified issues/concerns regarding health management: Pt identifies that she has been at TCU since her last hospital admission at [facility] in [place]. She identifies that she likely will need to return to TCU following the hospital.            Quality of Family Relationships: involved, supportive       ASSESSMENT  Cognitive Status:  alert and oriented  Concerns to be addressed: Discussed likely need for TCU following pts hospitalization. Pt stated understanding and agreement and stated that she would prefer to go to a different facility if possible, one that is closer to [place], though she is unsure which facilities closer to [place] take [employer]. SW offered to check on this for the pt, pt agreeable to a facility in [facility] or [place].     SW spoke with [facility] who reported no bed hold currently, but that they can work on another [employer] auth for the pt when they receive paperwork. [facility] called [facility] and left messages with admissions about their ability to work with [employer]. SW also sent referrals to these facilities and [facility].     PLAN   .  Patient given options and choices for discharge .  Patient/family is agreeable to the plan?  Yes  Patient Goals and Preferences: TCU .  Patient anticipates discharging to:  TCU .  [facility]    Addendum:   WIL informed that [facility], [facility] and [facility] are out of network for [employer]. [facility] reports being likely full. WIL udpated pt and discussed sending additional referrals. Pt requests SW check for other facilities that are closer to home. SW placed call to the [facility]  Home in [place],  [place], left VM requesting call back about ability to take [employer] and sent referral. SW spoke with admissions at [facility] who report [employer] is out of network.   1:30 PM    SW attempted to meet with pt again to discuss. Pt with staff and unavailable.  2:45 PM    SW met with pt, discussed that no closer locations had been found at this time that accept [employer]. Provided pt with list of [employer] Transitional Care units. Discussed sending referrals to some of them. Of the choices, pt requested referral back to [facility]. SW sent referral.  4:05 PM

## 2019-04-12 LAB
ANION GAP SERPL CALCULATED.3IONS-SCNC: 5 MMOL/L (ref 3–14)
BUN SERPL-MCNC: 11 MG/DL (ref 7–30)
CALCIUM SERPL-MCNC: 8 MG/DL (ref 8.5–10.1)
CHLORIDE SERPL-SCNC: 100 MMOL/L (ref 94–109)
CO2 SERPL-SCNC: 32 MMOL/L (ref 20–32)
CREAT SERPL-MCNC: 0.56 MG/DL (ref 0.52–1.04)
ERYTHROCYTE [DISTWIDTH] IN BLOOD BY AUTOMATED COUNT: 17 % (ref 10–15)
GFR SERPL CREATININE-BSD FRML MDRD: >90 ML/MIN/{1.73_M2}
GLUCOSE BLDC GLUCOMTR-MCNC: 101 MG/DL (ref 70–99)
GLUCOSE BLDC GLUCOMTR-MCNC: 107 MG/DL (ref 70–99)
GLUCOSE BLDC GLUCOMTR-MCNC: 116 MG/DL (ref 70–99)
GLUCOSE BLDC GLUCOMTR-MCNC: 119 MG/DL (ref 70–99)
GLUCOSE SERPL-MCNC: 122 MG/DL (ref 70–99)
HCT VFR BLD AUTO: 27.4 % (ref 35–47)
HGB BLD-MCNC: 8.3 G/DL (ref 11.7–15.7)
LACTATE BLD-SCNC: 1.4 MMOL/L (ref 0.7–2)
MCH RBC QN AUTO: 25.9 PG (ref 26.5–33)
MCHC RBC AUTO-ENTMCNC: 30.3 G/DL (ref 31.5–36.5)
MCV RBC AUTO: 86 FL (ref 78–100)
PLATELET # BLD AUTO: 277 10E9/L (ref 150–450)
POTASSIUM SERPL-SCNC: 3.6 MMOL/L (ref 3.4–5.3)
RBC # BLD AUTO: 3.2 10E12/L (ref 3.8–5.2)
SODIUM SERPL-SCNC: 137 MMOL/L (ref 133–144)
WBC # BLD AUTO: 15.1 10E9/L (ref 4–11)

## 2019-04-12 PROCEDURE — 94640 AIRWAY INHALATION TREATMENT: CPT | Mod: 76

## 2019-04-12 PROCEDURE — 25000132 ZZH RX MED GY IP 250 OP 250 PS 637: Performed by: INTERNAL MEDICINE

## 2019-04-12 PROCEDURE — 40000275 ZZH STATISTIC RCP TIME EA 10 MIN

## 2019-04-12 PROCEDURE — 99231 SBSQ HOSP IP/OBS SF/LOW 25: CPT | Performed by: SURGERY

## 2019-04-12 PROCEDURE — 25000132 ZZH RX MED GY IP 250 OP 250 PS 637: Performed by: PHYSICIAN ASSISTANT

## 2019-04-12 PROCEDURE — 25000128 H RX IP 250 OP 636: Performed by: INTERNAL MEDICINE

## 2019-04-12 PROCEDURE — 00000146 ZZHCL STATISTIC GLUCOSE BY METER IP

## 2019-04-12 PROCEDURE — 83605 ASSAY OF LACTIC ACID: CPT | Performed by: INTERNAL MEDICINE

## 2019-04-12 PROCEDURE — 25800030 ZZH RX IP 258 OP 636: Performed by: EMERGENCY MEDICINE

## 2019-04-12 PROCEDURE — 94640 AIRWAY INHALATION TREATMENT: CPT

## 2019-04-12 PROCEDURE — 36415 COLL VENOUS BLD VENIPUNCTURE: CPT | Performed by: INTERNAL MEDICINE

## 2019-04-12 PROCEDURE — 99233 SBSQ HOSP IP/OBS HIGH 50: CPT | Performed by: INTERNAL MEDICINE

## 2019-04-12 PROCEDURE — 80048 BASIC METABOLIC PNL TOTAL CA: CPT | Performed by: INTERNAL MEDICINE

## 2019-04-12 PROCEDURE — 25000125 ZZHC RX 250: Performed by: INTERNAL MEDICINE

## 2019-04-12 PROCEDURE — 12000000 ZZH R&B MED SURG/OB

## 2019-04-12 PROCEDURE — 25000128 H RX IP 250 OP 636: Performed by: PHYSICIAN ASSISTANT

## 2019-04-12 PROCEDURE — 85027 COMPLETE CBC AUTOMATED: CPT | Performed by: INTERNAL MEDICINE

## 2019-04-12 PROCEDURE — 25800030 ZZH RX IP 258 OP 636: Performed by: PHYSICIAN ASSISTANT

## 2019-04-12 RX ORDER — POLYETHYLENE GLYCOL 3350 17 G/17G
17 POWDER, FOR SOLUTION ORAL DAILY
Status: DISCONTINUED | OUTPATIENT
Start: 2019-04-12 | End: 2019-04-17 | Stop reason: HOSPADM

## 2019-04-12 RX ORDER — FLUCONAZOLE 2 MG/ML
400 INJECTION, SOLUTION INTRAVENOUS EVERY 24 HOURS
Status: DISCONTINUED | OUTPATIENT
Start: 2019-04-12 | End: 2019-04-17

## 2019-04-12 RX ADMIN — OXYCODONE HYDROCHLORIDE 15 MG: 5 TABLET ORAL at 10:08

## 2019-04-12 RX ADMIN — POLYETHYLENE GLYCOL 3350 17 G: 17 POWDER, FOR SOLUTION ORAL at 09:23

## 2019-04-12 RX ADMIN — BACLOFEN 10 MG: 10 TABLET ORAL at 22:43

## 2019-04-12 RX ADMIN — OXYCODONE HYDROCHLORIDE 15 MG: 5 TABLET ORAL at 03:41

## 2019-04-12 RX ADMIN — IPRATROPIUM BROMIDE AND ALBUTEROL SULFATE 3 ML: .5; 3 SOLUTION RESPIRATORY (INHALATION) at 11:17

## 2019-04-12 RX ADMIN — ENOXAPARIN SODIUM 40 MG: 40 INJECTION SUBCUTANEOUS at 22:44

## 2019-04-12 RX ADMIN — TAZOBACTAM SODIUM AND PIPERACILLIN SODIUM 3.38 G: 375; 3 INJECTION, SOLUTION INTRAVENOUS at 16:34

## 2019-04-12 RX ADMIN — IPRATROPIUM BROMIDE AND ALBUTEROL SULFATE 3 ML: .5; 3 SOLUTION RESPIRATORY (INHALATION) at 07:36

## 2019-04-12 RX ADMIN — MULTIPLE VITAMINS W/ MINERALS TAB 1 TABLET: TAB at 09:23

## 2019-04-12 RX ADMIN — SENNOSIDES AND DOCUSATE SODIUM 3 TABLET: 8.6; 5 TABLET ORAL at 22:43

## 2019-04-12 RX ADMIN — BACLOFEN 10 MG: 10 TABLET ORAL at 09:23

## 2019-04-12 RX ADMIN — SODIUM CHLORIDE, POTASSIUM CHLORIDE, SODIUM LACTATE AND CALCIUM CHLORIDE 1000 ML: 600; 310; 30; 20 INJECTION, SOLUTION INTRAVENOUS at 09:29

## 2019-04-12 RX ADMIN — ACETAMINOPHEN 650 MG: 325 TABLET, FILM COATED ORAL at 17:41

## 2019-04-12 RX ADMIN — GABAPENTIN 600 MG: 600 TABLET, FILM COATED ORAL at 09:24

## 2019-04-12 RX ADMIN — TAZOBACTAM SODIUM AND PIPERACILLIN SODIUM 3.38 G: 375; 3 INJECTION, SOLUTION INTRAVENOUS at 03:47

## 2019-04-12 RX ADMIN — OXYCODONE HYDROCHLORIDE 15 MG: 5 TABLET ORAL at 22:52

## 2019-04-12 RX ADMIN — ACETAMINOPHEN 650 MG: 325 TABLET, FILM COATED ORAL at 06:56

## 2019-04-12 RX ADMIN — OXYCODONE HYDROCHLORIDE 15 MG: 5 TABLET ORAL at 16:34

## 2019-04-12 RX ADMIN — TAZOBACTAM SODIUM AND PIPERACILLIN SODIUM 3.38 G: 375; 3 INJECTION, SOLUTION INTRAVENOUS at 22:44

## 2019-04-12 RX ADMIN — Medication: at 22:48

## 2019-04-12 RX ADMIN — AMITRIPTYLINE HYDROCHLORIDE 100 MG: 50 TABLET, FILM COATED ORAL at 22:42

## 2019-04-12 RX ADMIN — IPRATROPIUM BROMIDE AND ALBUTEROL SULFATE 3 ML: .5; 3 SOLUTION RESPIRATORY (INHALATION) at 19:08

## 2019-04-12 RX ADMIN — BACLOFEN 10 MG: 10 TABLET ORAL at 16:33

## 2019-04-12 RX ADMIN — FLUCONAZOLE 400 MG: 2 INJECTION, SOLUTION INTRAVENOUS at 14:58

## 2019-04-12 RX ADMIN — SENNOSIDES AND DOCUSATE SODIUM 3 TABLET: 8.6; 5 TABLET ORAL at 09:23

## 2019-04-12 RX ADMIN — TAZOBACTAM SODIUM AND PIPERACILLIN SODIUM 3.38 G: 375; 3 INJECTION, SOLUTION INTRAVENOUS at 10:08

## 2019-04-12 RX ADMIN — GABAPENTIN 600 MG: 600 TABLET, FILM COATED ORAL at 22:43

## 2019-04-12 RX ADMIN — ATORVASTATIN CALCIUM 10 MG: 10 TABLET, FILM COATED ORAL at 09:23

## 2019-04-12 RX ADMIN — Medication: at 09:25

## 2019-04-12 RX ADMIN — SODIUM CHLORIDE, POTASSIUM CHLORIDE, SODIUM LACTATE AND CALCIUM CHLORIDE: 600; 310; 30; 20 INJECTION, SOLUTION INTRAVENOUS at 00:31

## 2019-04-12 RX ADMIN — GABAPENTIN 600 MG: 600 TABLET, FILM COATED ORAL at 16:33

## 2019-04-12 ASSESSMENT — ACTIVITIES OF DAILY LIVING (ADL)
ADLS_ACUITY_SCORE: 34
ADLS_ACUITY_SCORE: 28
ADLS_ACUITY_SCORE: 34
ADLS_ACUITY_SCORE: 33

## 2019-04-12 NOTE — PLAN OF CARE
Ambulatory Status:  Pt up A2 with lift.  VS:  stable; afebrile.  Pain:  7/10 in back; gave oxycodone.  Resp: LS diminished on 2.5 L O2 NC.  GI:  denies. nausea.  good appetite and on mod cho diet.  BS active.  Passing flatus.  Last BM 4/12.  :  ramos in place.  Skin:  wound care for buttock completed this shift.  Tx:  zosyn;diflucan.  Labs:  lactic 1.4. ; 107. WBC 15.1.  Consults:  WOC/Surgery/ID  Disposition:  TBD.

## 2019-04-12 NOTE — PROGRESS NOTES
[facility]    Medicine Progress Note - Hospitalist Service       Date of Admission:  4/10/2019  Date of Service: 04/12/2019    Assessment & Plan     Amanda Richardson is a 53 year old female admitted on 4/10/2019 for concern of sepsis. She has underlying hx of MS, chronic pain with a pain pump in place. Also recent admissions for urosepsis related to renal stone, now removed and stent also removed since last hospitalization.  Review of previous cultures reveal E coli in the urine and more recently VRE (not felt to be pathogen) and Pseudomonas. Presented to the hospital this time with fevers, tachycardia, malaise, URI symptoms.      # Sepsis of unclear infectious source. Multiple potential sources of infection. WBC to 20K associated with malaise, URI symptoms, increased LE edema and redness. Lactate is normal and BP is stable. CXR with possible infiltrate; her symptoms are more consistent with URI than pneumonia. She also has a chronic decubitus ulcer which could be a source. LE edema and redness more likely venous stasis than cellulitis, although all potential sources.      -- Continue IV Piperacillin/Tazobactam  -- Await blood culture results, urine cultures. Also trying to obtain recent UC from SNF, fluconazole added given recent instrumentation and candida in urine   -- Patient seen by [facility] nurse. Surgery to evaluate regarding the decubitus ulcer although unclear if there is an easy intervention, will need long term good wound care and fup at [facility].   --continue IVF  --if were to decompensate, consider adding linezolid given recent VRE in urine    # Urinary retention. Patient reports having ramos intermittently in the past. Having high PVR and requiring straight cath. Given her significant skin issues and urinary retention, will place ramos for now. Should fup with urology as outpatient      # Chronic venous stasis. At risk for cellulitis   --does not appear infected at present.   Reports negative DVT US at TCU within the past few days  --hold furosemide and hydrochlorothiazide in setting of concern for active infection and dehydration, stop IVF     # Sacral decubitus ulcer, present on admission  --WOC consult, otherwise treatment as above, fup at [facility]   --asked surgery to see, patient had some necrotic fat issues that was debrided, although felt less likely to be the source of infection   --D/w patient that good long term wound care and off loading pressure is paramount      # Multiple sclerosis: baclofen pump in situ, reports refilled within the past 2 weeks.  Continue PTA PO baclofen    # Chronic pain syndrome: continue PTA neurontin, PRN oxycodone, amitriptyline  -- Patient reports that she has been on oxycodone 15 mg q6h prn for many years although recently it was decreased in half very recently at [facility]. She is quiet concerned about this and reports uncontrolled pain, wants to have her regular dose. Increased back to baseline dose, however, watch closely for signs of lethargy or excessive somnolence, I have discussed with her that if signs of excessive somnolence are noted then narcotics will be decreased/held      # Diabetes mellitus type 2  --controlled on PO meds.  Hold metformin.  Check glucose QID AC, low dose SSI     # Hypertension: currently normotensive, hold losartan, hydrochlorothiazide.  Continue Toprol XL  # Probable obesity hypoventilation. Monitor oxygenation and signs of oversedation   # Chronic constipation: miralax,senna as per PTA regimen     Diet: Combination Diet Regular Diet Adult; 4316-0416 Calories: Moderate Consistent CHO (4-6 CHO units/meal)  Snacks/Supplements Adult: Boost Glucose Control; Between Meals    DVT Prophylaxis: Enoxaparin (Lovenox) SQ  Bobo Catheter: in place, indication: Retention -Day 2-  Code Status: Full Code      Disposition Plan   Expected discharge: 4 days, recommended to transitional care unit once SIRS/Sepsis treated.  Entered: Lyric Egan  MD Aleksandr 04/12/2019, 4:30 PM       The patient's care was discussed with the Bedside Nurse and Patient.    Will see if patient will qualify for LTACH given her wound issues     Lyric Brandon MD  Hospitalist Service  [facility]    ______________________________________________________________________    Interval History   Chart reviewed and patient seen. Case discussed with nursing staff.     Patient feels better, fevers down today, denies any chest pain, shortness of breath. Some cough, cold and congestion symptoms. No reports of abdominal pain or vomiting. No new complaints voiced otherwise.       Data reviewed today: I reviewed all medications, new labs and imaging results over the last 24 hours. I personally reviewed    Physical Exam   Vital Signs: Temp: 99.8  F (37.7  C) Temp src: Oral BP: 141/70 Pulse: 101 Heart Rate: 96 Resp: 16 SpO2: 98 % O2 Device: Nasal cannula Oxygen Delivery: 2.5 LPM  Weight: 0 lbs 0 oz    GENERAL:  Awake and alert, chronically ill appearing  PSYCH: appropriate affect, no acute agitation   HEENT:  Neck is Supple, trachea is midline, EOMI, conjunctiva clear  CARDIOVASCULAR: Regular rate and rhythm, Normal S1, S2, no loud murmurs, no rubs or gallops.   PULMONARY:  Clear to auscultation bilaterally. Good air entry on both sides  GI: Abdomen is soft, non tender, non-distended, bowel sounds present. No rebound or guarding   SKIN:  No cyanosis or clubbing, sacral decub ulcer on left buttocks , detailed exam deferred to [facility] nurse and surgery, redness on legs appears more likely chronic venous stasis    MSK: Extremities are warm and well perfused. No pitting edema   Neuro: Awake and oriented x 3. Generalized weakness     Data   Recent Labs   Lab 04/12/19  0713 04/11/19  0758 04/10/19  1855   WBC 15.1* 15.2* 20.6*   HGB 8.3* 9.3* 9.5*   MCV 86 87 85    287 317    137 133   POTASSIUM 3.6 3.7 3.5   CHLORIDE 100 99 96   CO2 32 35* 36*   BUN 11 16 23   CR 0.56 0.64  0.83   ANIONGAP 5 3 1*   MARY 8.0* 8.2* 8.2*   * 117* 118*   ALBUMIN  --   --  2.4*   PROTTOTAL  --   --  8.5   BILITOTAL  --   --  0.3   ALKPHOS  --   --  143   ALT  --   --  22   AST  --   --  12       No results found for this or any previous visit (from the past 24 hour(s)).  Medications     lactated ringers 125 mL/hr at 04/12/19 0031       amitriptyline  100 mg Oral At Bedtime     atorvastatin  10 mg Oral Daily     baclofen  10 mg Oral TID     enoxaparin  40 mg Subcutaneous Q24H     fluconazole  400 mg Intravenous Q24H     gabapentin  600 mg Oral TID     insulin aspart  1-3 Units Subcutaneous TID AC     insulin aspart  1-3 Units Subcutaneous At Bedtime     ipratropium - albuterol 0.5 mg/2.5 mg/3 mL  1 vial Nebulization 4x Daily     metoprolol succinate ER  50 mg Oral Daily     multivitamin w/minerals  1 tablet Oral Daily     piperacillin-tazobactam  3.375 g Intravenous Q6H     polyethylene glycol  17 g Oral Daily     senna-docusate  3 tablet Oral BID     sodium hypochlorite   Topical BID

## 2019-04-12 NOTE — PLAN OF CARE
Lethargic arouses to voice c/o pain then drift off to sleep oxycodone 15 mg given,   Ax2 lift, turned as patient allowed  VS: Tmax 100.2 Tylenol given, sats 93% on 2.5 L NC  LS diminished, denies SOB.   BS active x4, passing flatus.   Bobo draining yellow urine  BLE  redness improving  L  buttocks drsg intact   LR at 125 ml/hr zosyn

## 2019-04-12 NOTE — PROGRESS NOTES
Pt's  left a vm inquiring about [facility] TCU.  I called Radha Connolly [phone] to see if they accept [employer] insurance.  Following.  Mitzi YOO CTS [phone]  Good nicole from [facility] won't have a bed till early next week.  Radha from [facility] called back and they said they can sometimes take humana.  I sent referral to fax [phone]    1307: MD wondering if pt would be a candidate for LTACH [facility].  I left a vm for Montserrat Reed to see before I'd send a referral.      1430: montserrat from [facility] said they will follow along.  It will also be dependent on [employer].

## 2019-04-12 NOTE — PROGRESS NOTES
SWS    D: Call received from Siri ([phone]) from [facility] who informs that they are in network with pt's [employer] insurance, addressed questions she had regarding dates of pt's previous hospitalization and TCU stay. She has asked that PT/OT notes be faxed to them when available @ [phone].

## 2019-04-12 NOTE — PROGRESS NOTES
Wound inspected and dressing changed with nursing.    Left buttock wound now widely open.  Some necrotic tissue present.  Repacked with Dakin's solution.    Continue dressing changes.  Will likely need more debridement soon.    Hemanth Card MD  [facility]

## 2019-04-12 NOTE — PROGRESS NOTES
Discharge Planner   Discharge Plans in progress: Called [facility] to request they look at with for pt. Also called [facility] and updated that pt is not ready till next week.  Tue/Wed. They are willing to assess.   Barriers to discharge plan: PT's Ins limites options   Follow up plan: Will resend and have them assess as well        Entered by: Corinne C. White 04/12/2019 10:13 AM

## 2019-04-12 NOTE — PLAN OF CARE
A&Ox4, Ax2 lift.   VSS, 2.5 L O2, low grade temps, oxycodone x1.  LS diminished, denies SOB.   BS active x4, passing flatus.   950 out of Bobo.  BLE redness improving, decreased from borders.  Deep pressure ulcer to L lower buttocks--WOC consulted, seen by surgery who debrided some dead tissue at the bedside--packed w/ moist kerlex and Dakins, covered w/ ABD.  LR at 125 ml/hr, zosyn q6h.   Will continue to monitor.

## 2019-04-12 NOTE — PROGRESS NOTES
[facility]  Infectious Disease Progress Note          Assessment and Plan:   IMPRESSION:   1.  A 53-year-old female well known to me from recent hospitalization, admitted from [facility], onset of fever, chills and leukocytosis at nursing home with etiology unclear.  Numerous possible issues, has abnormal urine currently and history of urine infections (see below), respiratory symptoms including some shortness of breath, nasal congestion and sore throat, large worsening decubitus wound with drainage, bilateral leg erythema, current cultures are pending.   2.  Abnormal urinalysis, now in a nursing home, apparent positive culture, but not available in our system, get the outside culture and see what current urine has shown, had recent major urine infection with obstruction which has been relieved, off antibiotics currently, had vancomycin-resistant enterococcus and Pseudomonas in those cultures.   3.  Worsening decubitus wound, some drainage, possible source of infection.   4.  Respiratory symptoms, question upper respiratory infection, possibly some infiltrates, negative influenza screening and clinical picture not suggesting that.   5.  Multiple sclerosis.   6.  Diabetes mellitus.   7.  Chronic stasis changes in both legs, bilateral erythema.  Doubt this is cellulitis.   8.  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      RECOMMENDATIONS:   1.  Zosyn as broad coverage for now. Fever still 101, some hypoxia, ? Early pneumonia on CXR  2.  Await current cultures, get outside urine culture, follow clinical course and readjust accordingly.   3.  Decubitus wound ,  Surgery dwbrided, not likely the cause of the fever unless she is bacteremic.   4 Only + is candida UC, givem recent urinary stents/manipulation , cover for now with flucon               Interval History:   no new complaints and doing well; no cp, sob, n/v/d, or abd pain. Looks mildly  ill sl hypoxia and cough some back pain, BC neg, UA abnormal here and [place], UC here candida ? noUC at NH wd debrided              Medications:       amitriptyline  100 mg Oral At Bedtime     atorvastatin  10 mg Oral Daily     baclofen  10 mg Oral TID     enoxaparin  40 mg Subcutaneous Q24H     fluconazole  400 mg Intravenous Q24H     gabapentin  600 mg Oral TID     insulin aspart  1-3 Units Subcutaneous TID AC     insulin aspart  1-3 Units Subcutaneous At Bedtime     ipratropium - albuterol 0.5 mg/2.5 mg/3 mL  1 vial Nebulization 4x Daily     metoprolol succinate ER  50 mg Oral Daily     multivitamin w/minerals  1 tablet Oral Daily     piperacillin-tazobactam  3.375 g Intravenous Q6H     polyethylene glycol  17 g Oral Daily     senna-docusate  3 tablet Oral BID     sodium hypochlorite   Topical BID                  Physical Exam:   Blood pressure 132/57, pulse 101, temperature 98.2  F (36.8  C), temperature source Oral, resp. rate 16, last menstrual period 12/11/2011, SpO2 93 %, not currently breastfeeding.  Wt Readings from Last 2 Encounters:   04/10/19 114.8 kg (253 lb)   04/09/19 114.8 kg (253 lb)     Vital Signs with Ranges  Temp:  [98.2  F (36.8  C)-101.4  F (38.6  C)] 98.2  F (36.8  C)  Pulse:  [101] 101  Heart Rate:  [] 93  Resp:  [16-20] 16  BP: (101-132)/(24-57) 132/57  SpO2:  [86 %-95 %] 93 %    Constitutional: Awake, alert, cooperative, no apparent distress ill appearing   Lungs: Congestionto auscultation bilaterally, no crackles or wheezing   Cardiovascular: Regular rate and rhythm, normal S1 and S2, and no murmur noted   Abdomen: Normal bowel sounds, soft, non-distended, non-tender   Skin: No rashes, no cyanosis, same  Edema and red legs   Other:           Data:   All microbiology laboratory data reviewed.  Recent Labs   Lab Test 04/12/19  0713 04/11/19  0758 04/10/19  1855   WBC 15.1* 15.2* 20.6*   HGB 8.3* 9.3* 9.5*   HCT 27.4* 30.7* 30.5*   MCV 86 87 85    287 317     Recent Labs    Lab Test 04/12/19  0713 04/11/19  0758 04/10/19  1855   CR 0.56 0.64 0.83     Recent Labs   Lab Test 11/01/17  1623   SED 39*     Recent Labs   Lab Test 04/10/19  1913 04/10/19  1855 04/10/19  1707 03/16/19  1550 03/16/19  1455 03/16/19  1222 03/16/19  1200 02/04/19  2130 01/31/19  0410   CULT No growth after 2 days No growth after 2 days 10,000 to 50,000 colonies/mL  Yeast  Culture in progress  * No growth Methicillin resistant Staphylococcus aureus (MRSA) isolated  This isolate DOES NOT demonstrate inducible clindamycin resistance in vitro. Clindamycin   is susceptible and could be used when indicated, however, erythromycin is resistant and   should not be used.  *  Olive Donald RN 5th floor notified of MRSA 3/20/19 0800 dg 50,000 to 100,000 colonies/mL  Pseudomonas aeruginosa  *  <10,000 colonies/mL  Enterococcus faecium (VRE)  *  Critical Value/Significant Value, preliminary result only, called to and read back by  Merline Bryan RN at [facility] MS5 at 2:30pm 3/18/2019 ()   No growth Cultured on the 2nd day of incubation:  Staphylococcus epidermidis  *  Critical Value/Significant Value, preliminary result only, called to and read back by  Claudia Marsh RN 0458 2/6/19. MS    (Note)  POSITIVE for STAPHYLOCOCCUS EPIDERMIDIS and POSITIVE for the mecA  gene (resistant to methicillin) by [employer] multiplex nucleic acid  test. Final identification and antimicrobial susceptibility testing  will be verified by standard methods.      Specimen tested with [employer] multiplex, gram-positive blood culture  nucleic acid test for the following targets: Staph aureus, Staph  epidermidis, Staph lugdunensis, other Staph species, Enterococcus  faecalis, Enterococcus faecium, Streptococcus species, S. agalactiae,  S. anginosus grp., S. pneumoniae, S. pyogenes, Listeria sp., mecA  (methicillin resistance) and Randolph/B (vancomycin resistance).      Critical Value/Significant Value called to and read back by RN  DIAMOND ESPINO 02.06.2019 AT 7.43AM,ZG    No growth Moderate growth  Normal geovanna          40

## 2019-04-13 ENCOUNTER — APPOINTMENT (OUTPATIENT)
Dept: PHYSICAL THERAPY | Facility: CLINIC | Age: 54
DRG: 853 | End: 2019-04-13
Attending: INTERNAL MEDICINE
Payer: COMMERCIAL

## 2019-04-13 ENCOUNTER — APPOINTMENT (OUTPATIENT)
Dept: OCCUPATIONAL THERAPY | Facility: CLINIC | Age: 54
DRG: 853 | End: 2019-04-13
Attending: INTERNAL MEDICINE
Payer: COMMERCIAL

## 2019-04-13 LAB
ANION GAP SERPL CALCULATED.3IONS-SCNC: <1 MMOL/L (ref 3–14)
BACTERIA SPEC CULT: ABNORMAL
BACTERIA SPEC CULT: ABNORMAL
BUN SERPL-MCNC: 11 MG/DL (ref 7–30)
CALCIUM SERPL-MCNC: 8.3 MG/DL (ref 8.5–10.1)
CHLORIDE SERPL-SCNC: 102 MMOL/L (ref 94–109)
CO2 SERPL-SCNC: 35 MMOL/L (ref 20–32)
CREAT SERPL-MCNC: 0.51 MG/DL (ref 0.52–1.04)
ERYTHROCYTE [DISTWIDTH] IN BLOOD BY AUTOMATED COUNT: 17.1 % (ref 10–15)
GFR SERPL CREATININE-BSD FRML MDRD: >90 ML/MIN/{1.73_M2}
GLUCOSE BLDC GLUCOMTR-MCNC: 102 MG/DL (ref 70–99)
GLUCOSE BLDC GLUCOMTR-MCNC: 113 MG/DL (ref 70–99)
GLUCOSE BLDC GLUCOMTR-MCNC: 161 MG/DL (ref 70–99)
GLUCOSE BLDC GLUCOMTR-MCNC: 94 MG/DL (ref 70–99)
GLUCOSE BLDC GLUCOMTR-MCNC: 96 MG/DL (ref 70–99)
GLUCOSE SERPL-MCNC: 106 MG/DL (ref 70–99)
HCT VFR BLD AUTO: 30.4 % (ref 35–47)
HGB BLD-MCNC: 9 G/DL (ref 11.7–15.7)
Lab: ABNORMAL
MCH RBC QN AUTO: 25.9 PG (ref 26.5–33)
MCHC RBC AUTO-ENTMCNC: 29.6 G/DL (ref 31.5–36.5)
MCV RBC AUTO: 87 FL (ref 78–100)
PLATELET # BLD AUTO: 295 10E9/L (ref 150–450)
POTASSIUM SERPL-SCNC: 3.9 MMOL/L (ref 3.4–5.3)
RBC # BLD AUTO: 3.48 10E12/L (ref 3.8–5.2)
SODIUM SERPL-SCNC: 137 MMOL/L (ref 133–144)
SPECIMEN SOURCE: ABNORMAL
WBC # BLD AUTO: 14.7 10E9/L (ref 4–11)

## 2019-04-13 PROCEDURE — 97162 PT EVAL MOD COMPLEX 30 MIN: CPT | Mod: GP

## 2019-04-13 PROCEDURE — 97530 THERAPEUTIC ACTIVITIES: CPT | Mod: GP

## 2019-04-13 PROCEDURE — 12000000 ZZH R&B MED SURG/OB

## 2019-04-13 PROCEDURE — 99231 SBSQ HOSP IP/OBS SF/LOW 25: CPT | Performed by: SURGERY

## 2019-04-13 PROCEDURE — 99232 SBSQ HOSP IP/OBS MODERATE 35: CPT | Performed by: INTERNAL MEDICINE

## 2019-04-13 PROCEDURE — 85027 COMPLETE CBC AUTOMATED: CPT | Performed by: INTERNAL MEDICINE

## 2019-04-13 PROCEDURE — 40000275 ZZH STATISTIC RCP TIME EA 10 MIN

## 2019-04-13 PROCEDURE — 99207 ZZC CDG-MDM COMPONENT: MEETS LOW - DOWN CODED: CPT | Performed by: INTERNAL MEDICINE

## 2019-04-13 PROCEDURE — 94640 AIRWAY INHALATION TREATMENT: CPT

## 2019-04-13 PROCEDURE — 97165 OT EVAL LOW COMPLEX 30 MIN: CPT | Mod: GO

## 2019-04-13 PROCEDURE — 00000146 ZZHCL STATISTIC GLUCOSE BY METER IP

## 2019-04-13 PROCEDURE — 25000125 ZZHC RX 250: Performed by: INTERNAL MEDICINE

## 2019-04-13 PROCEDURE — 97535 SELF CARE MNGMENT TRAINING: CPT | Mod: GO

## 2019-04-13 PROCEDURE — 97110 THERAPEUTIC EXERCISES: CPT | Mod: GP

## 2019-04-13 PROCEDURE — 25000132 ZZH RX MED GY IP 250 OP 250 PS 637: Performed by: PHYSICIAN ASSISTANT

## 2019-04-13 PROCEDURE — 94640 AIRWAY INHALATION TREATMENT: CPT | Mod: 76

## 2019-04-13 PROCEDURE — 80048 BASIC METABOLIC PNL TOTAL CA: CPT | Performed by: INTERNAL MEDICINE

## 2019-04-13 PROCEDURE — 25000128 H RX IP 250 OP 636: Performed by: PHYSICIAN ASSISTANT

## 2019-04-13 PROCEDURE — 25000132 ZZH RX MED GY IP 250 OP 250 PS 637: Performed by: INTERNAL MEDICINE

## 2019-04-13 PROCEDURE — 25000128 H RX IP 250 OP 636: Performed by: INTERNAL MEDICINE

## 2019-04-13 PROCEDURE — 36415 COLL VENOUS BLD VENIPUNCTURE: CPT | Performed by: INTERNAL MEDICINE

## 2019-04-13 RX ORDER — HYDROCHLOROTHIAZIDE 12.5 MG/1
12.5 CAPSULE ORAL DAILY
Status: DISCONTINUED | OUTPATIENT
Start: 2019-04-14 | End: 2019-04-13

## 2019-04-13 RX ADMIN — FLUCONAZOLE 400 MG: 2 INJECTION, SOLUTION INTRAVENOUS at 12:07

## 2019-04-13 RX ADMIN — MULTIPLE VITAMINS W/ MINERALS TAB 1 TABLET: TAB at 08:26

## 2019-04-13 RX ADMIN — METOPROLOL SUCCINATE 50 MG: 50 TABLET, EXTENDED RELEASE ORAL at 08:26

## 2019-04-13 RX ADMIN — TAZOBACTAM SODIUM AND PIPERACILLIN SODIUM 3.38 G: 375; 3 INJECTION, SOLUTION INTRAVENOUS at 16:37

## 2019-04-13 RX ADMIN — ATORVASTATIN CALCIUM 10 MG: 10 TABLET, FILM COATED ORAL at 08:26

## 2019-04-13 RX ADMIN — AMITRIPTYLINE HYDROCHLORIDE 100 MG: 50 TABLET, FILM COATED ORAL at 22:07

## 2019-04-13 RX ADMIN — IPRATROPIUM BROMIDE AND ALBUTEROL SULFATE 3 ML: .5; 3 SOLUTION RESPIRATORY (INHALATION) at 20:41

## 2019-04-13 RX ADMIN — OXYCODONE HYDROCHLORIDE 15 MG: 5 TABLET ORAL at 12:14

## 2019-04-13 RX ADMIN — TAZOBACTAM SODIUM AND PIPERACILLIN SODIUM 3.38 G: 375; 3 INJECTION, SOLUTION INTRAVENOUS at 22:09

## 2019-04-13 RX ADMIN — OXYCODONE HYDROCHLORIDE 15 MG: 5 TABLET ORAL at 05:54

## 2019-04-13 RX ADMIN — TAZOBACTAM SODIUM AND PIPERACILLIN SODIUM 3.38 G: 375; 3 INJECTION, SOLUTION INTRAVENOUS at 10:00

## 2019-04-13 RX ADMIN — IPRATROPIUM BROMIDE AND ALBUTEROL SULFATE 3 ML: .5; 3 SOLUTION RESPIRATORY (INHALATION) at 11:19

## 2019-04-13 RX ADMIN — BACLOFEN 10 MG: 10 TABLET ORAL at 08:26

## 2019-04-13 RX ADMIN — Medication: at 09:39

## 2019-04-13 RX ADMIN — IPRATROPIUM BROMIDE AND ALBUTEROL SULFATE 3 ML: .5; 3 SOLUTION RESPIRATORY (INHALATION) at 07:23

## 2019-04-13 RX ADMIN — GABAPENTIN 600 MG: 600 TABLET, FILM COATED ORAL at 22:07

## 2019-04-13 RX ADMIN — OXYCODONE HYDROCHLORIDE 15 MG: 5 TABLET ORAL at 18:12

## 2019-04-13 RX ADMIN — GABAPENTIN 600 MG: 600 TABLET, FILM COATED ORAL at 16:07

## 2019-04-13 RX ADMIN — SENNOSIDES AND DOCUSATE SODIUM 1 TABLET: 8.6; 5 TABLET ORAL at 08:29

## 2019-04-13 RX ADMIN — Medication: at 20:45

## 2019-04-13 RX ADMIN — BACLOFEN 10 MG: 10 TABLET ORAL at 16:07

## 2019-04-13 RX ADMIN — TAZOBACTAM SODIUM AND PIPERACILLIN SODIUM 3.38 G: 375; 3 INJECTION, SOLUTION INTRAVENOUS at 04:32

## 2019-04-13 RX ADMIN — GABAPENTIN 600 MG: 600 TABLET, FILM COATED ORAL at 08:26

## 2019-04-13 RX ADMIN — IPRATROPIUM BROMIDE AND ALBUTEROL SULFATE 3 ML: .5; 3 SOLUTION RESPIRATORY (INHALATION) at 15:20

## 2019-04-13 RX ADMIN — ENOXAPARIN SODIUM 40 MG: 40 INJECTION SUBCUTANEOUS at 22:08

## 2019-04-13 RX ADMIN — BACLOFEN 10 MG: 10 TABLET ORAL at 22:07

## 2019-04-13 ASSESSMENT — ACTIVITIES OF DAILY LIVING (ADL)
ADLS_ACUITY_SCORE: 31
PREVIOUS_RESPONSIBILITIES: MEAL PREP;HOUSEKEEPING
ADLS_ACUITY_SCORE: 28
ADLS_ACUITY_SCORE: 33
ADLS_ACUITY_SCORE: 31

## 2019-04-13 NOTE — PROGRESS NOTES
04/13/19 1500   Quick Adds   Type of Visit Initial PT Evaluation   Living Environment   Lives With spouse;child(heidi), adult   Living Arrangements house   Home Accessibility wheelchair accessible   Transportation Anticipated family or friend will provide   Living Environment Comment Pt lives in a split-level home with ramp to enter and stair lift to main level.    Self-Care   Usual Activity Tolerance good   Current Activity Tolerance fair   Equipment Currently Used at Home commode;grab bar, toilet;grab bar, tub/shower;shower chair   Functional Level Prior   Ambulation 4-->completely dependent   Transferring 3-->assistive equipment and person   Toileting 3-->assistive equipment and person   Bathing 3-->assistive equipment and person   Fall history within last six months yes   Number of times patient has fallen within last six months 1   Which of the above functional risks had a recent onset or change? transferring;toileting   Prior Functional Level Comment Pt states she fell while at the TCU and states this occured when she was reaching for water.    General Information   Onset of Illness/Injury or Date of Surgery - Date 04/10/19   Referring Physician Lyric Brandon MD   Patient/Family Goals Statement Pt is planning on going to a TCU to get her strength back to get her stand pivot transfer back.    Pertinent History of Current Problem (include personal factors and/or comorbidities that impact the POC) 53 year old female with past medical history significant for MS, chronic pain syndrome with pain pump in place, chronic decubitus and inguinal ulcers/wounds, type II DM, urinary retention, obesity, likely obesity hypoventilation syndrome, constipation, HTN, anemia, right femur fracture, and chronic leukocytosis with recent admission for urosepsis due to a ureteral stone who is now admitted on 4/10/2019 with fever and leukocytosis consistent with sepsis of unclear etiology.   Cognitive Status Examination    Orientation orientation to person, place and time   Level of Consciousness alert   Pain Assessment   Patient Currently in Pain Yes, see Vital Sign flowsheet  (6/10. Lower back and right hip pain. )   Integumentary/Edema   Integumentary/Edema Comments Bilteral LE edema    Range of Motion (ROM)   ROM Comment R LE ROM deficits at all joints (secondary to weakness)   Strength   Strength Comments Pt has significant weakness of her R LE and is unable to perform supine exercises independently with this leg or lift for bed mobility.    Bed Mobility   Bed Mobility Comments Min A supine to sit EOB. Max A x 1 sit to supine.    Transfer Skills   Transfer Comments Not assessed due to fatigue    Gait   Gait Comments Not assessed   Balance   Balance Comments Pt is able to support herself independentyl when seated in bed.    Sensory Examination   Sensory Perception no deficits were identified   General Therapy Interventions   Planned Therapy Interventions strengthening;ROM;bed mobility training;neuromuscular re-education;transfer training;home program guidelines;progressive activity/exercise   Clinical Impression   Criteria for Skilled Therapeutic Intervention yes, treatment indicated   PT Diagnosis Deconditioning, generalized weakness   Influenced by the following impairments Decreased R LE ROM and strength deficits, general decreased strength, and deconditioning/ decreased activity tolerance.    Functional limitations due to impairments Bed mobility, transfers, and ADL's.    Clinical Presentation Evolving/Changing   Clinical Presentation Rationale Pt has significnat co-morbidities that influence her care and has multiple body system involvement with her care today.    Clinical Decision Making (Complexity) Moderate complexity   Therapy Frequency` daily   Predicted Duration of Therapy Intervention (days/wks) 3-4 days   Anticipated Discharge Disposition Transitional Care Facility   Risk & Benefits of therapy have been explained  "Yes   Patient, Family & other staff in agreement with plan of care Yes   [facility] AM-PAC  \"6 Clicks\" V.2 Basic Mobility Inpatient Short Form   1. Turning from your back to your side while in a flat bed without using bedrails? 3 - A Little   2. Moving from lying on your back to sitting on the side of a flat bed without using bedrails? 2 - A Lot   3. Moving to and from a bed to a chair (including a wheelchair)? 1 - Total   4. Standing up from a chair using your arms (e.g., wheelchair, or bedside chair)? 1 - Total   5. To walk in hospital room? 1 - Total   6. Climbing 3-5 steps with a railing? 1 - Total   Basic Mobility Raw Score (Score out of 24.Lower scores equate to lower levels of function) 9   Total Evaluation Time   Total Evaluation Time (Minutes) 12     "

## 2019-04-13 NOTE — PLAN OF CARE
PT: Orders received. Chart reviewed. The pt is a 53 year old female with past medical history significant for MS, chronic pain syndrome with pain pump in place, chronic decubitus and inguinal ulcers/wounds, type II DM, urinary retention, obesity, likely obesity hypoventilation syndrome, constipation, HTN, anemia, right femur fracture, and chronic leukocytosis with recent admission for urosepsis due to a ureteral stone who is now admitted on 4/10/2019 with fever and leukocytosis consistent with sepsis of unclear etiology. Pt was previously at a TCU before coming to [facility] and she was not ambulatory and states she has used a wheelchair the last 5 years. She states she was velma to perform pivot tranfers with min A using a FWW before coming to the hospital. She reports she has good family and friend support that can help her as needed but feels TCU stay would be beneficial to get her back to performing stand-pivot transfers without assistance again.     Discharge Planner PT   Patient plan for discharge: TCU  Current status: Pt is supine with HOB elevated at the start of treatment. Pt is able to perform supine to sit bed mobility with min A for R LE support and is able to maintain seated balance without assistance. She is able to scoot with mod A with bed sheet to get better positioing at EOB before laying back down. She requires max A x1 sit sit to supine mobility for bilateral LE support and help repositioning in bed. Patient is supine with HOB elevated at the end of treatment today.   Barriers to return to prior living situation: Level of assist for bed mobility and transfers and general deconditioning/ activity tolerance.   Recommendations for discharge: TCU  Rationale for recommendations: Pt would benefit from further rehab for LE strengthening, her activity tolerance, and her bed mobility/ tranfers.        Entered by: Matt Mclain 04/13/2019 4:13 PM

## 2019-04-13 NOTE — PLAN OF CARE
Discharge Planner OT   Patient plan for discharge: Return to TCU  Current status: OT orders received and reviewed. OT eval completed; treatment initiated. Amanda Richardson is a 53 year old female admitted on 4/10/2019 for concern of sepsis. Pt lives in split-level home with spouse and adult child. Pt is wheel-chair bound. OT role and care plan discussed; pt agreeable to therapy. Pt supine in bed upon OT arrival. With max A x 2 and use of ceiling lift, pt transferred from bed <> chair. Max A x 1 for positioning once seated up in chair. With set-up, pt completed grooming/hygiene tasks. Pt educated on AE for increased ind with LB dressing; pt was receptive. Session ended with pt seated in chair; RN present.   Barriers to return to prior living situation: Increased pain; decreased ind with I/ADL's; decreased UB strength  Recommendations for discharge: TCU  Rationale for recommendations: Pt would benefit from continued skilled OT services for increased UB strength, activity tolerance, and ind with ADL's prior to returning home.        Entered by: Lidia Rosales 04/13/2019 12:26 PM

## 2019-04-13 NOTE — PROGRESS NOTES
Discharge Planner   Discharge Plans in progress: CHART REVIEWED.  [place] [phone], however there were no PT notes as of yet.      Follow up plan: will still need to fax PT notes to TCU.        Entered by: Armida Bill 04/13/2019 4:00 PM

## 2019-04-13 NOTE — PROGRESS NOTES
No complaints regarding left buttock wound.    Necrotic tissue still present.  Sharply debrided at bedside with scissors.    Some undermining laterally.    Wound repacked with Dakin's    Will need prolonged wound care and likely additional debridement.    Hemanth Card MD  [facility]

## 2019-04-13 NOTE — PLAN OF CARE
Patient alert and oriented x4. Turned and repositioned. Denies pain. Continuing IV antibiotics. Bobo in place, putting out adequate amounts. 0200 BS 96. No fever.

## 2019-04-13 NOTE — PLAN OF CARE
Patient remain hospitalized for UTI, A&Ox4, Ax2 with lift, VSS, T max 100.6, tylenol given, oxycodone given x2 for  buttocks & lower back pain, denies nausea, redness in LLE getting better, LS clear diminished, sats 96% O2 2 LPM NC. Wound on L buttocks with minimal drainage, dressing done. Repositioned side to side q 2hrs. BS hypo, senna given, ramos in place with adequate output. Modcho diet with good appetite. On IV zosyn. WOC & ID following, OT & PT consulted. Disposition TBD.

## 2019-04-13 NOTE — PLAN OF CARE
Patient alert and oriented.  VSS. Oxy x1 for pain, efffective. Total assist for mobility. Regular diet with good appetite. , 102. Diminished lung sounds, IS encouraged. 3+ edema to BLE, elevated. Surgery debrided wound. Wound care done per orders. Diflucan. 1 scheduled senna given per patient request. Family visiting. ID, WOC, PT, OT consults.

## 2019-04-13 NOTE — PROGRESS NOTES
[facility] [place]  Infectious Disease Progress Note          Assessment and Plan:   IMPRESSION:   1.  A 53-year-old female well known to me from recent hospitalization, admitted from [facility], onset of fever, chills and leukocytosis at nursing home with etiology unclear.  Numerous possible issues, has abnormal urine currently and history of urine infections (see below), respiratory symptoms including some shortness of breath, nasal congestion and sore throat, large worsening decubitus wound with drainage, bilateral leg erythema, current cultures are pending.   2.  Abnormal urinalysis, now in a nursing home, apparent positive culture, but not available in our system, get the outside culture and see what current urine has shown, had recent major urine infection with obstruction which has been relieved, off antibiotics currently, had vancomycin-resistant enterococcus and Pseudomonas in those cultures.   3.  Worsening decubitus wound, some drainage, possible source of infection.   4.  Respiratory symptoms, question upper respiratory infection, possibly some infiltrates, negative influenza screening and clinical picture not suggesting that.   5.  Multiple sclerosis.   6.  Diabetes mellitus.   7.  Chronic stasis changes in both legs, bilateral erythema.  Doubt this is cellulitis.   8.  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      RECOMMENDATIONS:   1.  Zosyn as broad coverage for now. Fever still 101, some hypoxia, ? Early pneumonia on CXR  2.  Follow clinical course and readjust accordingly.   3.  Decubitus wound ,  Surgery debrided, not likely the cause of the fever unless she is bacteremic.   4. Only + is candida UC, givem recent urinary stents/manipulation , cover for now with flucon               Interval History:   no new complaints and doing well; no cp, n/v/d, or abd pain.cough some back pain, BC neg, UA abnormal here and [place], UC here  candida ? noUC at NH wd debrided  Short of breath, dense cough               Medications:       amitriptyline  100 mg Oral At Bedtime     atorvastatin  10 mg Oral Daily     baclofen  10 mg Oral TID     enoxaparin  40 mg Subcutaneous Q24H     fluconazole  400 mg Intravenous Q24H     gabapentin  600 mg Oral TID     insulin aspart  1-3 Units Subcutaneous TID AC     insulin aspart  1-3 Units Subcutaneous At Bedtime     ipratropium - albuterol 0.5 mg/2.5 mg/3 mL  1 vial Nebulization 4x Daily     metoprolol succinate ER  50 mg Oral Daily     multivitamin w/minerals  1 tablet Oral Daily     piperacillin-tazobactam  3.375 g Intravenous Q6H     polyethylene glycol  17 g Oral Daily     senna-docusate  3 tablet Oral BID     sodium hypochlorite   Topical BID                  Physical Exam:   Blood pressure 122/44, pulse 96, temperature 98.2  F (36.8  C), temperature source Oral, resp. rate 16, last menstrual period 12/11/2011, SpO2 92 %, not currently breastfeeding.  Wt Readings from Last 2 Encounters:   04/10/19 114.8 kg (253 lb)   04/09/19 114.8 kg (253 lb)     Vital Signs with Ranges  Temp:  [98.2  F (36.8  C)-100.6  F (38.1  C)] 98.2  F (36.8  C)  Pulse:  [96] 96  Heart Rate:  [] 92  Resp:  [16-18] 16  BP: (104-141)/(44-70) 122/44  SpO2:  [91 %-98 %] 92 %    Constitutional: Awake, alert, cooperative, no apparent distress ill appearing   Lungs: Congestionto auscultation bilaterally, no crackles or wheezing   Cardiovascular: Regular rate and rhythm, normal S1 and S2, and no murmur noted   Abdomen: Normal bowel sounds, soft, non-distended, non-tender   Skin: No rashes, no cyanosis, same  Edema and red legs   Other:           Data:   All microbiology laboratory data reviewed.  Recent Labs   Lab Test 04/13/19  0716 04/12/19  0713 04/11/19  0758   WBC 14.7* 15.1* 15.2*   HGB 9.0* 8.3* 9.3*   HCT 30.4* 27.4* 30.7*   MCV 87 86 87    277 287     Recent Labs   Lab Test 04/13/19  0716 04/12/19  0713 04/11/19  0758   CR  0.51* 0.56 0.64     Recent Labs   Lab Test 11/01/17  1623   SED 39*     Recent Labs   Lab Test 04/10/19  1913 04/10/19  1855 04/10/19  1707 03/16/19  1550 03/16/19  1455 03/16/19  1222 03/16/19  1200 02/04/19  2130 01/31/19  0410   CULT No growth after 3 days No growth after 3 days 10,000 to 50,000 colonies/mL  Candida albicans / dubliniensis  Candida albicans and Candida dubliniensis are not routinely speciated  Susceptibility testing not routinely done  *  <1000 colonies/mL  mixed urogenital geovanna  Susceptibility testing not routinely done   No growth Methicillin resistant Staphylococcus aureus (MRSA) isolated  This isolate DOES NOT demonstrate inducible clindamycin resistance in vitro. Clindamycin   is susceptible and could be used when indicated, however, erythromycin is resistant and   should not be used.  *  Olive Donald RN 5th floor notified of MRSA 3/20/19 0800 dg 50,000 to 100,000 colonies/mL  Pseudomonas aeruginosa  *  <10,000 colonies/mL  Enterococcus faecium (VRE)  *  Critical Value/Significant Value, preliminary result only, called to and read back by  Merline Bryan RN at [facility] MS5 at 2:30pm 3/18/2019 ()   No growth Cultured on the 2nd day of incubation:  Staphylococcus epidermidis  *  Critical Value/Significant Value, preliminary result only, called to and read back by  Claudia Marsh, FREDO [id number] 2/6/19. MS    (Note)  POSITIVE for STAPHYLOCOCCUS EPIDERMIDIS and POSITIVE for the mecA  gene (resistant to methicillin) by [employer] multiplex nucleic acid  test. Final identification and antimicrobial susceptibility testing  will be verified by standard methods.      Specimen tested with [employer] multiplex, gram-positive blood culture  nucleic acid test for the following targets: Staph aureus, Staph  epidermidis, Staph lugdunensis, other Staph species, Enterococcus  faecalis, Enterococcus faecium, Streptococcus species, S. agalactiae,  S. anginosus grp., S. pneumoniae, S. pyogenes, Listeria sp.,  mecA  (methicillin resistance) and Randolph/B (vancomycin resistance).      Critical Value/Significant Value called to and read back by FREDO ESPINO 02.06.2019 AT 7.43AM,ZG    No growth Moderate growth  Normal geovanna

## 2019-04-13 NOTE — PROGRESS NOTES
[facility]  Hospitalist Progress Note  Jarvis Rutherford MD 04/13/19    Reason for Stay (Diagnosis): Sepsis         Assessment and Plan:      Summary of Stay: Amanda Richardson is a 53 year old female with past medical history significant for MS, chronic pain syndrome with pain pump in place, chronic decubitus and inguinal ulcers/wounds, type II DM, urinary retention, obesity, likely obesity hypoventilation syndrome, constipation, HTN, anemia, right femur fracture, and chronic leukocytosis with recent admission for urosepsis due to a ureteral stone who is now admitted on 4/10/2019 with fever and leukocytosis consistent with sepsis of unclear etiology.  Started on Zosyn empirically and ID was consulted.  Blood cultures no growth today.  Urine culture growing yeast so IV fluconazole was added.  Neurosurgery was consulted to evaluate her decubitus ulcers which show some necrotic tissue, but did not seem to be the source of infection.  Has undergone local debridement of these ulcers twice.  Having urinary retention and a Bobo catheter was placed for this and for ongoing wound cares.    Problem List/Assessment and Plan:   Sepsis of unclear infectious source:  Multiple potential sources of infection. WBC to 20K associated with malaise, URI symptoms, increased LE edema and redness. Lactate is normal and BP is stable. CXR with possible infiltrate; her symptoms are more consistent with URI than pneumonia. She also has a chronic decubitus ulcer which could be a source although does not look infected per surgery.  LE edema and redness more likely venous stasis than cellulitis, although all potential sources.  Urine culture growing yeast.  She does have a chronic leukocytosis so unclear that this will ever normalize.  Fever curve perhaps improving.  -ID consulted  -Continue IV Piperacillin/Tazobactam  -Blood cultures NGTD  -Urine culture growing yeast. IV fluconazole added given recent instrumentation and candida  in urine   -if were to decompensate, consider adding linezolid given recent VRE in urine     Urinary retention: Patient reports having ramos intermittently in the past. Having high PVR and requiring straight cath. Given her significant skin issues and urinary retention, will place ramos for now.  Will keep in place and needs follow-up with urology as outpatient.      Chronic venous stasis, lymphedema: At risk for cellulitis slight pink color to her legs, but they do not appear infected at present.  Does have bilateral 1+ lower extremity edema.  Reports negative DVT US at TCU within the past few days  -Likely resume Lasix tomorrow     Sacral decubitus ulcer, present on admission  -WOC consult, otherwise treatment as above, fup at [facility]   -asked surgery to see, patient had some necrotic fat issues that was debrided twice, although felt less likely to be the source of infection   -D/w patient that good long term wound care in clinic versus plastic surgery clinic and off loading pressure      Multiple sclerosis: baclofen pump in situ, reports refilled within the past 2 weeks.  -Continue PTA PO baclofen     Chronic pain syndrome: continue PTA neurontin, PRN oxycodone, amitriptyline  -Patient reports that she has been on oxycodone 15 mg q6h prn for many years although recently it was decreased in half very recently at [facility]. She is quiet concerned about this and reports uncontrolled pain, wants to have her regular dose.  Increased back to baseline dose and so far no worsening somnolence.    Diabetes mellitus type 2:  controlled on PO meds.  Hold metformin for sepsis.  Check glucose QID AC, low dose SSI     HTN: Blood pressure 100-120 systolic while here.  -Continue metoprolol XL 50 mg daily  -Holding PTA losartan 50 mg daily and HCTZ 12.5 mg daily  -Resume Lasix tomorrow for lower extremity edema    Probable obesity hypoventilation: Monitor oxygenation and signs of oversedation.    Chronic constipation: miralax,senna as per  PTA regimen    History of right femur fracture: Incidentally found to have a previous right femur fracture on admission 2 months ago.  Does have some chronic right hip pain.    HLD: Continue atorvastatin.    Chronic anemia: Hemoglobin at baseline of 8-10.    DVT Prophylaxis: Enoxaparin (Lovenox) SQ  Code Status: Full Code  FEN: Consistent carbohydrate moderate  Discharge Dispo: Back to TCU.  PT, OT, social work consult  Estimated Disch Date / # of Days until Disch: 2-3 days pending antibiotic plan        Interval History (Subjective):      Assumed care this morning.  Febrile to 100.6 yesterday evening.  She feels a little bit better.  Denies any chest pain or shortness of breath.  Does have some chronic back and right hip pain.  Persistent cough.  Only catheter placed for retention.  Bedside debridement of decubitus ulcer by surgery team today.                  Physical Exam:      Last Vital Signs:  /44 (BP Location: Left arm)   Pulse 96   Temp 98.2  F (36.8  C) (Oral)   Resp 17   LMP 12/11/2011   SpO2 92%       Intake/Output Summary (Last 24 hours) at 4/13/2019 1302  Last data filed at 4/13/2019 0956  Gross per 24 hour   Intake 690 ml   Output 4050 ml   Net -3360 ml       Constitutional: Awake, NAD   Eyes: sclera white   HEENT:  MMM  Respiratory:   lungs cta bilaterally, no crackles or wheeze  Cardiovascular: RRR.  No murmur   GI: non-tender, not distended, bowel sounds present  Skin: Wrinkling skin lower extremities, pink area to left lower leg  Musculoskeletal/extremities: 1+ edema bilateral lower extremities  Neurologic: A&O  Psychiatric: calm, cooperative         Medications:      All current medications were reviewed with changes reflected in problem list.         Data:      All new lab and imaging data was reviewed.   Labs:  Recent Labs   Lab 04/10/19  1913 04/10/19  1855 04/10/19  1707   CULT No growth after 3 days No growth after 3 days 10,000 to 50,000 colonies/mL  Candida albicans /  dubliniensis  Candida albicans and Candida dubliniensis are not routinely speciated  Susceptibility testing not routinely done  *  <1000 colonies/mL  mixed urogenital geovanna  Susceptibility testing not routinely done       Recent Labs   Lab 04/13/19  0716 04/12/19  0713 04/11/19  0758    137 137   POTASSIUM 3.9 3.6 3.7   CHLORIDE 102 100 99   CO2 35* 32 35*   ANIONGAP <1* 5 3   * 122* 117*   BUN 11 11 16   CR 0.51* 0.56 0.64   GFRESTIMATED >90 >90 >90   GFRESTBLACK >90 >90 >90   MARY 8.3* 8.0* 8.2*     Recent Labs   Lab 04/13/19  0716 04/12/19  0713 04/11/19  0758   WBC 14.7* 15.1* 15.2*   HGB 9.0* 8.3* 9.3*   HCT 30.4* 27.4* 30.7*   MCV 87 86 87    277 287      Imaging:   None today      Jarvis Rutherford MD

## 2019-04-13 NOTE — PROGRESS NOTES
04/13/19 1100   Quick Adds   Type of Visit Initial Occupational Therapy Evaluation   Living Environment   Lives With spouse;child(heidi), adult   Living Arrangements house   Home Accessibility wheelchair accessible   Transportation Anticipated family or friend will provide   Living Environment Comment Pt lives in a split-level home with ramp to enter and stair lift to main level.    Self-Care   Usual Activity Tolerance good   Current Activity Tolerance fair   Regular Exercise Yes   Equipment Currently Used at Home commode;grab bar, tub/shower;lift device;shower chair;wheelchair, manual;grab bar, toilet;dressing device   Functional Level   Ambulation 4-->completely dependent   Transferring 3-->assistive equipment and person   Toileting 3-->assistive equipment and person   Bathing 3-->assistive equipment and person   Dressing 3-->assistive equipment and person   Eating 0-->independent   Communication 0-->understands/communicates without difficulty   Fall history within last six months yes   Number of times patient has fallen within last six months 1   Which of the above functional risks had a recent onset or change? none   General Information   Onset of Illness/Injury or Date of Surgery - Date 04/10/19   Referring Physician Nii Jennings MD   Patient/Family Goals Statement Return to TCU for increased ind with transfers   Additional Occupational Profile Info/Pertinent History of Current Problem Amanda Richardson is a 53 year old female admitted on 4/10/2019 for concern of sepsis.   Cognitive Status Examination   Orientation orientation to person, place and time   Level of Consciousness alert   Follows Commands (Cognition) WNL   Sensory Examination   Sensory Quick Adds Other (describe)  (None outside of baseline)   Pain Assessment   Patient Currently in Pain Yes, see Vital Sign flowsheet   Range of Motion (ROM)   ROM Comment UB WFL; pt wheel-chair bound   Strength   Strength Comments UB WFL; pt wheel-chair bound    Hand Strength   Hand Strength Comments UB WFL; pt wheel-chair bound   Muscle Tone Assessment   Muscle Tone Comments UB WFL; pt wheel-chair bound   Coordination   Coordination Comments UB WFL; pt wheel-chair bound   Mobility   Bed Mobility Comments Lift utilized for OOB activity   Transfer Skills   Transfer Transfer Skill: Stand to Sit   Transfer Skill: Bed to Chair/Chair to Bed   Level of [place]: Bed to Chair dependent (less than 25% patients effort)   Physical Assist/Nonphysical Assist: Bed to Chair 2 persons;verbal cues   Assistive Device - Transfer Skill Bed to Chair Chair to Bed Rehab Eval   (Ceiling lift)   Toilet Transfer   Toilet Transfer Comments Pt utilized commode at home via pivot transfer   Transfer Skill: Toilet Transfer   Toilet Transfer Skill Comments Pt utilized commode at home via pivot transfer   Tub/Shower Transfer   Tub/Shower Transfer Comments Pt reported having shower chair and completes transfer via pivot   Bathing   Level of [place] maximum assist (25% patients effort)   Physical Assist/Nonphysical Assist 1 person assist   Upper Body Dressing   Level of [place]: Dress Upper Body independent   Grooming   Level of [place]: Grooming independent   Eating/Self Feeding   Level of [place]: Eating independent   Instrumental Activities of Daily Living (IADL)   Previous Responsibilities meal prep;housekeeping   Activities of Daily Living Analysis   Impairments Contributing to Impaired Activities of Daily Living pain;strength decreased   General Therapy Interventions   Planned Therapy Interventions ADL retraining;strengthening   Clinical Impression   Criteria for Skilled Therapeutic Interventions Met yes, treatment indicated   OT Diagnosis Decreased ind with I/ADL's   Influenced by the following impairments Increased pain; decreased strength; decreased coordination   Assessment of Occupational Performance 3-5 Performance Deficits   Identified Performance Deficits Bathing,  dressing, grooming/hygiene, home management tasks, toileting, transfers   Clinical Decision Making (Complexity) Moderate complexity   Therapy Frequency daily   Predicted Duration of Therapy Intervention (days/wks) 3 days   Anticipated Discharge Disposition Transitional Care Facility   Risks and Benefits of Treatment have been explained. Yes   Patient, Family & other staff in agreement with plan of care Yes   Total Evaluation Time   Total Evaluation Time (Minutes) 8

## 2019-04-14 ENCOUNTER — APPOINTMENT (OUTPATIENT)
Dept: OCCUPATIONAL THERAPY | Facility: CLINIC | Age: 54
DRG: 853 | End: 2019-04-14
Payer: COMMERCIAL

## 2019-04-14 ENCOUNTER — APPOINTMENT (OUTPATIENT)
Dept: PHYSICAL THERAPY | Facility: CLINIC | Age: 54
DRG: 853 | End: 2019-04-14
Payer: COMMERCIAL

## 2019-04-14 LAB
ANION GAP SERPL CALCULATED.3IONS-SCNC: <1 MMOL/L (ref 3–14)
BUN SERPL-MCNC: 12 MG/DL (ref 7–30)
CALCIUM SERPL-MCNC: 8.1 MG/DL (ref 8.5–10.1)
CHLORIDE SERPL-SCNC: 103 MMOL/L (ref 94–109)
CO2 SERPL-SCNC: 34 MMOL/L (ref 20–32)
CREAT SERPL-MCNC: 0.55 MG/DL (ref 0.52–1.04)
ERYTHROCYTE [DISTWIDTH] IN BLOOD BY AUTOMATED COUNT: 17 % (ref 10–15)
GFR SERPL CREATININE-BSD FRML MDRD: >90 ML/MIN/{1.73_M2}
GLUCOSE BLDC GLUCOMTR-MCNC: 106 MG/DL (ref 70–99)
GLUCOSE BLDC GLUCOMTR-MCNC: 109 MG/DL (ref 70–99)
GLUCOSE BLDC GLUCOMTR-MCNC: 138 MG/DL (ref 70–99)
GLUCOSE BLDC GLUCOMTR-MCNC: 93 MG/DL (ref 70–99)
GLUCOSE SERPL-MCNC: 109 MG/DL (ref 70–99)
HCT VFR BLD AUTO: 28.7 % (ref 35–47)
HGB BLD-MCNC: 8.7 G/DL (ref 11.7–15.7)
MCH RBC QN AUTO: 26.5 PG (ref 26.5–33)
MCHC RBC AUTO-ENTMCNC: 30.3 G/DL (ref 31.5–36.5)
MCV RBC AUTO: 88 FL (ref 78–100)
PLATELET # BLD AUTO: 319 10E9/L (ref 150–450)
POTASSIUM SERPL-SCNC: 3.6 MMOL/L (ref 3.4–5.3)
RBC # BLD AUTO: 3.28 10E12/L (ref 3.8–5.2)
SODIUM SERPL-SCNC: 137 MMOL/L (ref 133–144)
WBC # BLD AUTO: 15 10E9/L (ref 4–11)

## 2019-04-14 PROCEDURE — 97110 THERAPEUTIC EXERCISES: CPT | Mod: GP | Performed by: PHYSICAL THERAPIST

## 2019-04-14 PROCEDURE — 94640 AIRWAY INHALATION TREATMENT: CPT | Mod: 76

## 2019-04-14 PROCEDURE — 25000132 ZZH RX MED GY IP 250 OP 250 PS 637: Performed by: INTERNAL MEDICINE

## 2019-04-14 PROCEDURE — 97110 THERAPEUTIC EXERCISES: CPT | Mod: GO

## 2019-04-14 PROCEDURE — 25000128 H RX IP 250 OP 636: Performed by: INTERNAL MEDICINE

## 2019-04-14 PROCEDURE — 25000128 H RX IP 250 OP 636: Performed by: PHYSICIAN ASSISTANT

## 2019-04-14 PROCEDURE — 36415 COLL VENOUS BLD VENIPUNCTURE: CPT | Performed by: INTERNAL MEDICINE

## 2019-04-14 PROCEDURE — 99232 SBSQ HOSP IP/OBS MODERATE 35: CPT | Performed by: INTERNAL MEDICINE

## 2019-04-14 PROCEDURE — 97535 SELF CARE MNGMENT TRAINING: CPT | Mod: GO

## 2019-04-14 PROCEDURE — 25000132 ZZH RX MED GY IP 250 OP 250 PS 637: Performed by: PHYSICIAN ASSISTANT

## 2019-04-14 PROCEDURE — 85027 COMPLETE CBC AUTOMATED: CPT | Performed by: INTERNAL MEDICINE

## 2019-04-14 PROCEDURE — 94640 AIRWAY INHALATION TREATMENT: CPT

## 2019-04-14 PROCEDURE — 00000146 ZZHCL STATISTIC GLUCOSE BY METER IP

## 2019-04-14 PROCEDURE — 80048 BASIC METABOLIC PNL TOTAL CA: CPT | Performed by: INTERNAL MEDICINE

## 2019-04-14 PROCEDURE — 12000000 ZZH R&B MED SURG/OB

## 2019-04-14 PROCEDURE — 40000275 ZZH STATISTIC RCP TIME EA 10 MIN

## 2019-04-14 PROCEDURE — 40000274 ZZH STATISTIC RCP CONSULT EA 30 MIN

## 2019-04-14 PROCEDURE — 99231 SBSQ HOSP IP/OBS SF/LOW 25: CPT | Performed by: SURGERY

## 2019-04-14 PROCEDURE — 25000125 ZZHC RX 250: Performed by: INTERNAL MEDICINE

## 2019-04-14 PROCEDURE — 99207 ZZC CDG-MDM COMPONENT: MEETS LOW - DOWN CODED: CPT | Performed by: INTERNAL MEDICINE

## 2019-04-14 RX ORDER — FUROSEMIDE 40 MG
40 TABLET ORAL DAILY
Status: DISCONTINUED | OUTPATIENT
Start: 2019-04-14 | End: 2019-04-17 | Stop reason: HOSPADM

## 2019-04-14 RX ADMIN — ATORVASTATIN CALCIUM 10 MG: 10 TABLET, FILM COATED ORAL at 08:42

## 2019-04-14 RX ADMIN — MULTIPLE VITAMINS W/ MINERALS TAB 1 TABLET: TAB at 08:41

## 2019-04-14 RX ADMIN — IPRATROPIUM BROMIDE AND ALBUTEROL SULFATE 3 ML: .5; 3 SOLUTION RESPIRATORY (INHALATION) at 11:44

## 2019-04-14 RX ADMIN — Medication: at 10:52

## 2019-04-14 RX ADMIN — FLUCONAZOLE 400 MG: 2 INJECTION, SOLUTION INTRAVENOUS at 11:33

## 2019-04-14 RX ADMIN — TAZOBACTAM SODIUM AND PIPERACILLIN SODIUM 3.38 G: 375; 3 INJECTION, SOLUTION INTRAVENOUS at 16:23

## 2019-04-14 RX ADMIN — OXYCODONE HYDROCHLORIDE 15 MG: 5 TABLET ORAL at 16:20

## 2019-04-14 RX ADMIN — BACLOFEN 10 MG: 10 TABLET ORAL at 22:25

## 2019-04-14 RX ADMIN — BACLOFEN 10 MG: 10 TABLET ORAL at 16:19

## 2019-04-14 RX ADMIN — METOPROLOL SUCCINATE 50 MG: 50 TABLET, EXTENDED RELEASE ORAL at 08:42

## 2019-04-14 RX ADMIN — IPRATROPIUM BROMIDE AND ALBUTEROL SULFATE 3 ML: .5; 3 SOLUTION RESPIRATORY (INHALATION) at 07:47

## 2019-04-14 RX ADMIN — OXYCODONE HYDROCHLORIDE 15 MG: 5 TABLET ORAL at 08:41

## 2019-04-14 RX ADMIN — AMITRIPTYLINE HYDROCHLORIDE 100 MG: 50 TABLET, FILM COATED ORAL at 22:24

## 2019-04-14 RX ADMIN — GABAPENTIN 600 MG: 600 TABLET, FILM COATED ORAL at 08:42

## 2019-04-14 RX ADMIN — GABAPENTIN 600 MG: 600 TABLET, FILM COATED ORAL at 16:19

## 2019-04-14 RX ADMIN — FUROSEMIDE 40 MG: 40 TABLET ORAL at 09:30

## 2019-04-14 RX ADMIN — BACLOFEN 10 MG: 10 TABLET ORAL at 08:42

## 2019-04-14 RX ADMIN — TAZOBACTAM SODIUM AND PIPERACILLIN SODIUM 3.38 G: 375; 3 INJECTION, SOLUTION INTRAVENOUS at 10:51

## 2019-04-14 RX ADMIN — TAZOBACTAM SODIUM AND PIPERACILLIN SODIUM 3.38 G: 375; 3 INJECTION, SOLUTION INTRAVENOUS at 22:27

## 2019-04-14 RX ADMIN — ENOXAPARIN SODIUM 40 MG: 40 INJECTION SUBCUTANEOUS at 22:25

## 2019-04-14 RX ADMIN — Medication: at 21:00

## 2019-04-14 RX ADMIN — GABAPENTIN 600 MG: 600 TABLET, FILM COATED ORAL at 22:24

## 2019-04-14 RX ADMIN — OXYCODONE HYDROCHLORIDE 15 MG: 5 TABLET ORAL at 00:49

## 2019-04-14 RX ADMIN — OXYCODONE HYDROCHLORIDE 15 MG: 5 TABLET ORAL at 22:25

## 2019-04-14 RX ADMIN — TAZOBACTAM SODIUM AND PIPERACILLIN SODIUM 3.38 G: 375; 3 INJECTION, SOLUTION INTRAVENOUS at 04:39

## 2019-04-14 RX ADMIN — IPRATROPIUM BROMIDE AND ALBUTEROL SULFATE 3 ML: .5; 3 SOLUTION RESPIRATORY (INHALATION) at 19:51

## 2019-04-14 RX ADMIN — IPRATROPIUM BROMIDE AND ALBUTEROL SULFATE 3 ML: .5; 3 SOLUTION RESPIRATORY (INHALATION) at 15:37

## 2019-04-14 ASSESSMENT — ACTIVITIES OF DAILY LIVING (ADL)
ADLS_ACUITY_SCORE: 29
ADLS_ACUITY_SCORE: 33
ADLS_ACUITY_SCORE: 29

## 2019-04-14 NOTE — PROGRESS NOTES
SWS PROGRESS NOTE:    I: Pt being followed by SWS for placement on DC. TCU vs LTACH, pending PT/OT recs. CM also following for LTACH ref, if appropriate. PT/OT notes were faxed to Siri Connolly at [facility] (F: [phone]) today; currently this is pt's spouse's preferred DC location.   P: SW following for placement and as needed until discharge.     Mahi Sawyer, MSW, LICSW

## 2019-04-14 NOTE — PLAN OF CARE
Temp max 99.6. Up in chair with lift. Wound care done per plan of care. Good appetite dinner. Oxycodone for pain in shoulder, back and hip. Remains on 2L oxygen to maintain sats. Lung sounds diminished.

## 2019-04-14 NOTE — PLAN OF CARE
Discharge Planner OT   Patient plan for discharge: TCU   Current status: Pt required set-up A for grooming tasks sitting up in chair. Pt able to demo IND after set-up with all grooming tasks. OT instructed on theraband exercises for UE strengthening. Pt completed 12 reps of each exercise with yellow resistance band, tolerates well.   Barriers to return to prior living situation: current level of A, not at baseline with mobility/transfers  Recommendations for discharge: LTACH if pt qualifies   Rationale for recommendations: Pt would benefit from continued skilled OT services to improve independence and safety with ADL's and functional transfers as pt is not at baseline.           Entered by: Shannon Chinchilla 04/14/2019 11:44 AM

## 2019-04-14 NOTE — PROGRESS NOTES
[facility]  Hospitalist Progress Note  Jarvis Rutherford MD 04/14/19    Reason for Stay (Diagnosis): Sepsis         Assessment and Plan:      Summary of Stay: Amanda Richardson is a 53 year old female with past medical history significant for MS, chronic pain syndrome with pain pump in place, chronic decubitus and inguinal ulcers/wounds, type II DM, urinary retention, obesity, likely obesity hypoventilation syndrome, constipation, HTN, anemia, right femur fracture, and chronic leukocytosis with recent admission for urosepsis due to a ureteral stone who is now admitted on 4/10/2019 with fever and leukocytosis consistent with sepsis of unclear etiology.  Started on Zosyn empirically and ID was consulted.  Blood cultures no growth today.  Urine culture growing yeast so IV fluconazole was added.  Neurosurgery was consulted to evaluate her decubitus ulcers which show some necrotic tissue, but did not seem to be the source of infection.  Has undergone local debridement of these ulcers while here.  Having urinary retention and a Bobo catheter was placed for this and for ongoing wound cares.  Resuming her Lasix for lower extremity edema.  PT and OT consulted, TCU versus long-term care.    Problem List/Assessment and Plan:   Sepsis of unclear infectious source:  Multiple potential sources of infection. WBC to 20K associated with malaise, URI symptoms, increased LE edema and redness. Lactate is normal and BP is stable. CXR with possible infiltrate; her symptoms are more consistent with URI than pneumonia. She also has a chronic decubitus ulcer which could be a source although does not look infected per surgery.  LE edema and redness more likely venous stasis than cellulitis, although all potential sources.  Urine culture growing yeast.  She does have a chronic leukocytosis so unclear that this will ever normalize.  Fever curve improving.  Cough now productive of more sputum that she says is green.  -ID  consulted  -Continue IV Piperacillin/Tazobactam today  -Blood cultures NGTD  -Urine culture growing yeast. IV fluconazole added given recent instrumentation and candida in urine   -need to determine antibiotic plan     Urinary retention: Patient reports having ramos intermittently in the past. Having high PVR and requiring straight cath. Given her significant skin issues and urinary retention, will place ramos for now.  Will keep in place and needs follow-up with urology as outpatient.      Chronic venous stasis, lymphedema: At risk for cellulitis slight pink color to her legs, but they do not appear infected at present.  Does have bilateral 1+ lower extremity edema.  Reports negative DVT US at TCU within the past few days  -Resume Lasix 40 mg p.o. daily today     Sacral decubitus ulcer, present on admission  -WOC consult, otherwise treatment as above, fup at [facility]   -asked surgery to see, patient had some small areas of necrotic tissue that has been debrided multiple times here, although felt less likely to be the source of infection   -D/w patient that good long term wound care in clinic versus plastic surgery clinic and off loading pressure      Multiple sclerosis: baclofen pump in situ, reports refilled within the past 2 weeks.  -Continue PTA PO baclofen     Chronic pain syndrome: continue PTA neurontin, PRN oxycodone, amitriptyline  -Patient reports that she has been on oxycodone 15 mg q6h prn for many years although recently it was decreased in half very recently at [facility]. She is quiet concerned about this and reports uncontrolled pain, wants to have her regular dose.  Increased back to baseline dose and so far no worsening somnolence.    Diabetes mellitus type 2:  controlled on PO meds.  Hold metformin for sepsis.  Check glucose QID AC, low dose SSI     HTN: Blood pressure 100-120 systolic while here.  -Continue metoprolol XL 50 mg daily  -Holding PTA losartan 50 mg daily and HCTZ 12.5 mg daily  -Resume Lasix 40  mg p.o. daily today    Probable obesity hypoventilation: Monitor oxygenation and signs of oversedation.    Chronic constipation: miralax,senna as per PTA regimen    History of right femur fracture: Incidentally found to have a previous right femur fracture on admission 2 months ago.  Does have some chronic right hip pain.    HLD: Continue atorvastatin.    Chronic anemia: Hemoglobin at baseline of 8-10.    DVT Prophylaxis: Enoxaparin (Lovenox) SQ  Code Status: Full Code  FEN: Consistent carbohydrate moderate  Discharge Dispo: TCU versus long-term care.  PT, OT, social work consult  Estimated Disch Date / # of Days until Disch: 1-2 days pending antibiotic and disposition plan        Interval History (Subjective):      Small amount of bedside debridement of decubitus ulcer by surgery team today.  Tolerating regular diet denies nausea or vomiting.  Chronic back pain and shoulder pain present.  No fevers or chills with max temperature 99.6.  Still has an intermittent cough that is acting more productive now.  Not short of breath at rest but has been on 1 or 2 L of oxygen.                  Physical Exam:      Last Vital Signs:  /52 (BP Location: Left arm)   Pulse 87   Temp 97.7  F (36.5  C) (Oral)   Resp 20   LMP 12/11/2011   SpO2 92%     Intake/Output Summary (Last 24 hours) at 4/14/2019 1133  Last data filed at 4/14/2019 1111  Gross per 24 hour   Intake 1300 ml   Output 3855 ml   Net -2555 ml       Constitutional: Awake, NAD   Eyes: sclera white   HEENT:  MMM  Respiratory: Crackles at bases.  No wheeze  Cardiovascular: RRR.  No murmur   GI: non-tender, not distended, bowel sounds present  Skin: Wrinkling skin lower extremities, pink area to bilateral legs  Musculoskeletal/extremities: 1+ edema bilateral lower extremities  Neurologic: A&O  Psychiatric: calm, cooperative         Medications:      All current medications were reviewed with changes reflected in problem list.         Data:      All new lab and  imaging data was reviewed.   Labs:  Recent Labs   Lab 04/10/19  1913 04/10/19  1855 04/10/19  1707   CULT No growth after 4 days No growth after 4 days 10,000 to 50,000 colonies/mL  Candida albicans / dubliniensis  Candida albicans and Candida dubliniensis are not routinely speciated  Susceptibility testing not routinely done  *  <1000 colonies/mL  mixed urogenital geovanna  Susceptibility testing not routinely done       Recent Labs   Lab 04/14/19  0715 04/13/19  0716 04/12/19  0713    137 137   POTASSIUM 3.6 3.9 3.6   CHLORIDE 103 102 100   CO2 34* 35* 32   ANIONGAP <1* <1* 5   * 106* 122*   BUN 12 11 11   CR 0.55 0.51* 0.56   GFRESTIMATED >90 >90 >90   GFRESTBLACK >90 >90 >90   MARY 8.1* 8.3* 8.0*     Recent Labs   Lab 04/14/19  0715 04/13/19  0716 04/12/19  0713   WBC 15.0* 14.7* 15.1*   HGB 8.7* 9.0* 8.3*   HCT 28.7* 30.4* 27.4*   MCV 88 87 86    295 277      Imaging:   None today      Jarvis Rutherford MD

## 2019-04-14 NOTE — PROGRESS NOTES
"[facility] RCAT     Date: 4/14/2019  Admission Dx: Leukocytosis, Sepsis  Pulmonary History: None/former smoker  Home Nebulizer/MDI Use: None  Home Oxygen: Room air  Acuity Level (RCAT flow sheet):  Aerosol Therapy initiated: Continue Duoneb QID with Albuterol Q4 hours prn.      Pulmonary Hygiene initiated: Deep breath and cough TID      Volume Expansion initiated: IS TID      Current Oxygen Requirements: Room air  Current SpO2: 92%    Re-evaluation date: 4/17/2019    Patient Education: Discussed RCAT with Pt and encouraged continued use of the IS.      See \"RT Assessments\" flow sheet for patient assessment scoring and Acuity Level Details.       Sahil Quispe  April 14, 2019.3:37 PM            "

## 2019-04-14 NOTE — PLAN OF CARE
Discharge Planner PT   Patient plan for discharge: TCU  Current status: Pt declined attempting EOB or OOB. Pt agreeable to supine exercises, R LE weaker than L. Tolerating fair, R hip pain with increased ROM due to previous hip fx.   Barriers to return to prior living situation: Level of assist for bed mobility and transfers and general deconditioning/ activity tolerance.   Recommendations for discharge: TCU  Rationale for recommendations: Pt would benefit from further rehab for LE strengthening, her activity tolerance, and her bed mobility/ tranfers.        Entered by: Florecita Chambers 04/14/2019 4:21 PM

## 2019-04-14 NOTE — PROGRESS NOTES
[facility]  Infectious Disease Progress Note          Assessment and Plan:   IMPRESSION:   1.  A 53-year-old female well known to me from recent hospitalization, admitted from [facility], onset of fever, chills and leukocytosis at nursing home with etiology unclear.  Numerous possible issues, has abnormal urine currently and history of urine infections (see below), respiratory symptoms including some shortness of breath, nasal congestion and sore throat, large worsening decubitus wound with drainage, bilateral leg erythema, current cultures are pending.   2.  Abnormal urinalysis, now in a nursing home, apparent positive culture, but not available in our system, get the outside culture and see what current urine has shown, had recent major urine infection with obstruction which has been relieved, off antibiotics currently, had vancomycin-resistant enterococcus and Pseudomonas in those cultures.   3.  Worsening decubitus wound, some drainage, possible source of infection.   4.  Respiratory symptoms, question upper respiratory infection, possibly some infiltrates, negative influenza screening and clinical picture not suggesting that.   5.  Multiple sclerosis.   6.  Diabetes mellitus.   7.  Chronic stasis changes in both legs, bilateral erythema.  Doubt this is cellulitis.   8.  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      RECOMMENDATIONS:   1.  Zosyn as broad coverage for now.  2.  Follow clinical course and readjust accordingly.   3.  Decubitus wound ,  Surgery debrided, not likely the cause of the fever unless she is bacteremic.   4. Only + is candida UC, givem recent urinary stents/manipulation , cover for now with flucon               Interval History:   no cp, n/v/d, or abd pain.cough some back pain, BC neg, UA abnormal here and [place], UC here candida ? noUC at NH wd debrided  Short of breath, dense cough               Medications:        amitriptyline  100 mg Oral At Bedtime     atorvastatin  10 mg Oral Daily     baclofen  10 mg Oral TID     enoxaparin  40 mg Subcutaneous Q24H     fluconazole  400 mg Intravenous Q24H     furosemide  40 mg Oral Daily     gabapentin  600 mg Oral TID     insulin aspart  1-3 Units Subcutaneous TID AC     insulin aspart  1-3 Units Subcutaneous At Bedtime     ipratropium - albuterol 0.5 mg/2.5 mg/3 mL  1 vial Nebulization 4x Daily     metoprolol succinate ER  50 mg Oral Daily     multivitamin w/minerals  1 tablet Oral Daily     piperacillin-tazobactam  3.375 g Intravenous Q6H     polyethylene glycol  17 g Oral Daily     senna-docusate  3 tablet Oral BID     sodium hypochlorite   Topical BID                  Physical Exam:   Blood pressure 116/52, pulse 87, temperature 97.7  F (36.5  C), temperature source Oral, resp. rate 20, last menstrual period 12/11/2011, SpO2 92 %, not currently breastfeeding.  Wt Readings from Last 2 Encounters:   04/10/19 114.8 kg (253 lb)   04/09/19 114.8 kg (253 lb)     Vital Signs with Ranges  Temp:  [97.7  F (36.5  C)-99.6  F (37.6  C)] 97.7  F (36.5  C)  Pulse:  [87-92] 87  Heart Rate:  [54-91] 87  Resp:  [17-20] 20  BP: (116-137)/(43-65) 116/52  SpO2:  [91 %-94 %] 92 %    Constitutional:  ill appearing   Lungs: Congestionto auscultation bilaterally, no crackles or wheezing   Cardiovascular: Regular rate and rhythm, normal S1 and S2, and no murmur noted   Abdomen: Normal bowel sounds, soft, non-distended, non-tender   Skin: No rashes, no cyanosis, same  Edema and red legs   Other:           Data:   All microbiology laboratory data reviewed.  Recent Labs   Lab Test 04/14/19  0715 04/13/19  0716 04/12/19  0713   WBC 15.0* 14.7* 15.1*   HGB 8.7* 9.0* 8.3*   HCT 28.7* 30.4* 27.4*   MCV 88 87 86    295 277     Recent Labs   Lab Test 04/14/19  0715 04/13/19  0716 04/12/19  0713   CR 0.55 0.51* 0.56     Recent Labs   Lab Test 11/01/17  1623   SED 39*     Recent Labs   Lab Test  04/10/19  1913 04/10/19  1855 04/10/19  1707 03/16/19  1550 03/16/19  1455 03/16/19  1222 03/16/19  1200 02/04/19  2130 01/31/19  0410   CULT No growth after 4 days No growth after 4 days 10,000 to 50,000 colonies/mL  Candida albicans / dubliniensis  Candida albicans and Candida dubliniensis are not routinely speciated  Susceptibility testing not routinely done  *  <1000 colonies/mL  mixed urogenital geovanna  Susceptibility testing not routinely done   No growth Methicillin resistant Staphylococcus aureus (MRSA) isolated  This isolate DOES NOT demonstrate inducible clindamycin resistance in vitro. Clindamycin   is susceptible and could be used when indicated, however, erythromycin is resistant and   should not be used.  *  Olive Donald RN 5th floor notified of MRSA 3/20/19 0800 dg 50,000 to 100,000 colonies/mL  Pseudomonas aeruginosa  *  <10,000 colonies/mL  Enterococcus faecium (VRE)  *  Critical Value/Significant Value, preliminary result only, called to and read back by  Merline Bryan RN at [facility] MS5 at 2:30pm 3/18/2019 ()   No growth Cultured on the 2nd day of incubation:  Staphylococcus epidermidis  *  Critical Value/Significant Value, preliminary result only, called to and read back by  Claudia Marsh RN [id number] 2/6/19. MS    (Note)  POSITIVE for STAPHYLOCOCCUS EPIDERMIDIS and POSITIVE for the mecA  gene (resistant to methicillin) by [employer] multiplex nucleic acid  test. Final identification and antimicrobial susceptibility testing  will be verified by standard methods.      Specimen tested with Verigene multiplex, gram-positive blood culture  nucleic acid test for the following targets: Staph aureus, Staph  epidermidis, Staph lugdunensis, other Staph species, Enterococcus  faecalis, Enterococcus faecium, Streptococcus species, S. agalactiae,  S. anginosus grp., S. pneumoniae, S. pyogenes, Listeria sp., mecA  (methicillin resistance) and Randolph/B (vancomycin resistance).      Critical  Value/Significant Value called to and read back by FREDO ESPINO 02.06.2019 AT 7.43AM,ZG    No growth Moderate growth  Normal geovanna

## 2019-04-14 NOTE — PLAN OF CARE
Alert and oriented x4. Up with lift. Repositioned frequently. Oxycodone given for buttock pain. 1 soft incontinent stool. Wound dressing CDI. On 2LNC. Edema to LE.

## 2019-04-14 NOTE — PROGRESS NOTES
General Surgery Progress Note  SUBJECTIVE:  Resting in bed, doing fine. No complaints. Denies pain.    OBJECTIVE:  /52 (BP Location: Left arm)   Pulse 87   Temp 97.7  F (36.5  C) (Oral)   Resp 20   LMP 12/11/2011   SpO2 92%      Wound - necrotic tissue still present, debrided a small amount at bedside, some undermining laterally. Cleaned wound with MicroKlens spray and gauze. Repacked wound with Dakins soaked Kerlix gauze.    ASSESSMENT/PLAN:  Left buttock wound - continue BID dressing changes  Will likely need prolonged wound care and further debridement  Following    Gray Lipscomb PA-C    Seen and agree,    Hemanth Card MD  [facility]

## 2019-04-14 NOTE — PLAN OF CARE
Patient alert and oriented. VSS. Oxy x1 for pain, effective. Started daily lasix. Mod CHO diet with good appetite. A2 with lift for mobility, up in chair. Diminished lung sounds, IS encouraged. Weaned to room air. Diflucan and zosyn. Wound care done per orders. Left buttocks wound debrided per PA, patient tolerated.

## 2019-04-15 ENCOUNTER — APPOINTMENT (OUTPATIENT)
Dept: PHYSICAL THERAPY | Facility: CLINIC | Age: 54
DRG: 853 | End: 2019-04-15
Payer: COMMERCIAL

## 2019-04-15 LAB
ANION GAP SERPL CALCULATED.3IONS-SCNC: 6 MMOL/L (ref 3–14)
BUN SERPL-MCNC: 13 MG/DL (ref 7–30)
CALCIUM SERPL-MCNC: 8.4 MG/DL (ref 8.5–10.1)
CHLORIDE SERPL-SCNC: 103 MMOL/L (ref 94–109)
CO2 SERPL-SCNC: 30 MMOL/L (ref 20–32)
CREAT SERPL-MCNC: 0.57 MG/DL (ref 0.52–1.04)
ERYTHROCYTE [DISTWIDTH] IN BLOOD BY AUTOMATED COUNT: 17.2 % (ref 10–15)
GFR SERPL CREATININE-BSD FRML MDRD: >90 ML/MIN/{1.73_M2}
GLUCOSE BLDC GLUCOMTR-MCNC: 104 MG/DL (ref 70–99)
GLUCOSE BLDC GLUCOMTR-MCNC: 118 MG/DL (ref 70–99)
GLUCOSE BLDC GLUCOMTR-MCNC: 121 MG/DL (ref 70–99)
GLUCOSE BLDC GLUCOMTR-MCNC: 157 MG/DL (ref 70–99)
GLUCOSE SERPL-MCNC: 115 MG/DL (ref 70–99)
HCT VFR BLD AUTO: 29.8 % (ref 35–47)
HGB BLD-MCNC: 9 G/DL (ref 11.7–15.7)
MCH RBC QN AUTO: 26.3 PG (ref 26.5–33)
MCHC RBC AUTO-ENTMCNC: 30.2 G/DL (ref 31.5–36.5)
MCV RBC AUTO: 87 FL (ref 78–100)
PLATELET # BLD AUTO: 354 10E9/L (ref 150–450)
POTASSIUM SERPL-SCNC: 3.6 MMOL/L (ref 3.4–5.3)
RBC # BLD AUTO: 3.42 10E12/L (ref 3.8–5.2)
SODIUM SERPL-SCNC: 139 MMOL/L (ref 133–144)
WBC # BLD AUTO: 16.2 10E9/L (ref 4–11)

## 2019-04-15 PROCEDURE — 97110 THERAPEUTIC EXERCISES: CPT | Mod: GP | Performed by: PHYSICAL THERAPY ASSISTANT

## 2019-04-15 PROCEDURE — 40000275 ZZH STATISTIC RCP TIME EA 10 MIN

## 2019-04-15 PROCEDURE — 99232 SBSQ HOSP IP/OBS MODERATE 35: CPT | Performed by: INTERNAL MEDICINE

## 2019-04-15 PROCEDURE — 00000146 ZZHCL STATISTIC GLUCOSE BY METER IP

## 2019-04-15 PROCEDURE — 25000128 H RX IP 250 OP 636: Performed by: PHYSICIAN ASSISTANT

## 2019-04-15 PROCEDURE — 25000132 ZZH RX MED GY IP 250 OP 250 PS 637: Performed by: PHYSICIAN ASSISTANT

## 2019-04-15 PROCEDURE — 12000000 ZZH R&B MED SURG/OB

## 2019-04-15 PROCEDURE — 80048 BASIC METABOLIC PNL TOTAL CA: CPT | Performed by: INTERNAL MEDICINE

## 2019-04-15 PROCEDURE — 25000132 ZZH RX MED GY IP 250 OP 250 PS 637: Performed by: INTERNAL MEDICINE

## 2019-04-15 PROCEDURE — 36415 COLL VENOUS BLD VENIPUNCTURE: CPT | Performed by: INTERNAL MEDICINE

## 2019-04-15 PROCEDURE — 25000128 H RX IP 250 OP 636: Performed by: INTERNAL MEDICINE

## 2019-04-15 PROCEDURE — 85027 COMPLETE CBC AUTOMATED: CPT | Performed by: INTERNAL MEDICINE

## 2019-04-15 PROCEDURE — 94640 AIRWAY INHALATION TREATMENT: CPT | Mod: 76

## 2019-04-15 PROCEDURE — 99231 SBSQ HOSP IP/OBS SF/LOW 25: CPT | Performed by: SURGERY

## 2019-04-15 PROCEDURE — 94640 AIRWAY INHALATION TREATMENT: CPT

## 2019-04-15 PROCEDURE — 25000125 ZZHC RX 250: Performed by: INTERNAL MEDICINE

## 2019-04-15 RX ADMIN — GABAPENTIN 600 MG: 600 TABLET, FILM COATED ORAL at 21:33

## 2019-04-15 RX ADMIN — BACLOFEN 10 MG: 10 TABLET ORAL at 16:38

## 2019-04-15 RX ADMIN — OXYCODONE HYDROCHLORIDE 15 MG: 5 TABLET ORAL at 18:06

## 2019-04-15 RX ADMIN — GABAPENTIN 600 MG: 600 TABLET, FILM COATED ORAL at 16:38

## 2019-04-15 RX ADMIN — TAZOBACTAM SODIUM AND PIPERACILLIN SODIUM 3.38 G: 375; 3 INJECTION, SOLUTION INTRAVENOUS at 18:14

## 2019-04-15 RX ADMIN — AMITRIPTYLINE HYDROCHLORIDE 100 MG: 50 TABLET, FILM COATED ORAL at 21:33

## 2019-04-15 RX ADMIN — FLUCONAZOLE 400 MG: 2 INJECTION, SOLUTION INTRAVENOUS at 12:35

## 2019-04-15 RX ADMIN — FUROSEMIDE 40 MG: 40 TABLET ORAL at 07:57

## 2019-04-15 RX ADMIN — BACLOFEN 10 MG: 10 TABLET ORAL at 07:57

## 2019-04-15 RX ADMIN — OXYCODONE HYDROCHLORIDE 15 MG: 5 TABLET ORAL at 12:02

## 2019-04-15 RX ADMIN — TAZOBACTAM SODIUM AND PIPERACILLIN SODIUM 3.38 G: 375; 3 INJECTION, SOLUTION INTRAVENOUS at 12:02

## 2019-04-15 RX ADMIN — MULTIPLE VITAMINS W/ MINERALS TAB 1 TABLET: TAB at 07:57

## 2019-04-15 RX ADMIN — Medication: at 21:14

## 2019-04-15 RX ADMIN — IPRATROPIUM BROMIDE AND ALBUTEROL SULFATE 3 ML: .5; 3 SOLUTION RESPIRATORY (INHALATION) at 12:05

## 2019-04-15 RX ADMIN — GABAPENTIN 600 MG: 600 TABLET, FILM COATED ORAL at 07:57

## 2019-04-15 RX ADMIN — ATORVASTATIN CALCIUM 10 MG: 10 TABLET, FILM COATED ORAL at 07:56

## 2019-04-15 RX ADMIN — IPRATROPIUM BROMIDE AND ALBUTEROL SULFATE 3 ML: .5; 3 SOLUTION RESPIRATORY (INHALATION) at 15:57

## 2019-04-15 RX ADMIN — IPRATROPIUM BROMIDE AND ALBUTEROL SULFATE 3 ML: .5; 3 SOLUTION RESPIRATORY (INHALATION) at 07:42

## 2019-04-15 RX ADMIN — ENOXAPARIN SODIUM 40 MG: 40 INJECTION SUBCUTANEOUS at 21:33

## 2019-04-15 RX ADMIN — METOPROLOL SUCCINATE 50 MG: 50 TABLET, EXTENDED RELEASE ORAL at 07:56

## 2019-04-15 RX ADMIN — TAZOBACTAM SODIUM AND PIPERACILLIN SODIUM 3.38 G: 375; 3 INJECTION, SOLUTION INTRAVENOUS at 05:57

## 2019-04-15 RX ADMIN — IPRATROPIUM BROMIDE AND ALBUTEROL SULFATE 3 ML: .5; 3 SOLUTION RESPIRATORY (INHALATION) at 19:12

## 2019-04-15 RX ADMIN — OXYCODONE HYDROCHLORIDE 15 MG: 5 TABLET ORAL at 05:57

## 2019-04-15 RX ADMIN — BACLOFEN 10 MG: 10 TABLET ORAL at 21:33

## 2019-04-15 RX ADMIN — Medication: at 10:44

## 2019-04-15 ASSESSMENT — ACTIVITIES OF DAILY LIVING (ADL)
ADLS_ACUITY_SCORE: 31
ADLS_ACUITY_SCORE: 31
ADLS_ACUITY_SCORE: 29
ADLS_ACUITY_SCORE: 31

## 2019-04-15 NOTE — PROGRESS NOTES
General Surgery Progress Note  SUBJECTIVE:  Tmax 100.4 yesterday evening.  Comfortable in bed, no complaints. Denies pain now.  Takes Oxycodone PRN per nursing notes.  Tolerating diet, last BM was yesterday.    OBJECTIVE:  /60 (BP Location: Right arm)   Pulse 87   Temp 97.9  F (36.6  C) (Oral)   Resp 16   Wt 114.4 kg (252 lb 3.2 oz)   LMP 12/11/2011   SpO2 92%   BMI 39.50 kg/m       Wound - dressing change recently performed by nursing.  + mild fibrin at wound edge.  No surrounding erythema.      ASSESSMENT/PLAN:  Left buttock wound - continue BID dressing changes  Will likely need prolonged wound care and further debridement  Discharge to TCU when placement available      Fozia Pool PA-C    Seen and agree,    Hemanth Card MD  [facility]

## 2019-04-15 NOTE — PLAN OF CARE
Patient alert and oriented. VSS, room air. Oxy x1 for pain. Total assist with lift for mobility. Mod CHO diet with good appetite. Dim LS, encouraged SI. Wound care completed. Encouraged repositioning every 2 hours and in chair only for meals. ID, PT, OT consults. Bobo for urinary retention. Diflucan and zosyn. Discharge to TCU.

## 2019-04-15 NOTE — PLAN OF CARE
Temp max 100.4. Lung sounds diminished. Non productive cough. Maintaining sats on room air. Wound care done per plan of care. Legs remain red and edematous. Pain in left shoulder, buttocks, right hip and low back. Oxycodone for comfort. Up in chair with lift and staff turning pt in bed every two hours.

## 2019-04-15 NOTE — PROGRESS NOTES
Discharge Planner   Discharge Plans in progress: Per MD, pt ready to d.c once placement can be found. Faxed updated info to [facility] and [facility].   Barriers to discharge plan: finding placement with Pt's ins plan   Follow up plan: Spouse really wants [facility] due to where they live        Entered by: Corinne C. White 04/15/2019 10:43 AM

## 2019-04-15 NOTE — PROGRESS NOTES
[facility]  Infectious Disease Progress Note          Assessment and Plan:   IMPRESSION:   1.  A 53-year-old female well known to me from recent hospitalization, admitted from [facility], onset of fever, chills and leukocytosis at nursing home with etiology unclear.  Numerous possible issues, has abnormal urine currently and history of urine infections (see below), respiratory symptoms including some shortness of breath, nasal congestion and sore throat, large worsening decubitus wound with drainage, bilateral leg erythema, current cultures are pending.   2.  Abnormal urinalysis, now in a nursing home, apparent positive culture, but not available in our system, get the outside culture and see what current urine has shown, had recent major urine infection with obstruction which has been relieved, off antibiotics currently, had vancomycin-resistant enterococcus and Pseudomonas in those cultures.   3.  Worsening decubitus wound, some drainage, possible source of infection.   4.  Respiratory symptoms, question upper respiratory infection, possibly some infiltrates, negative influenza screening and clinical picture not suggesting that.   5.  Multiple sclerosis.   6.  Diabetes mellitus.   7.  Chronic stasis changes in both legs, bilateral erythema.  Doubt this is cellulitis.   8.  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      RECOMMENDATIONS:   1.  Zosyn empiric day 5+, flucon day 4  2.  Improved clinical course and cx same   3.  Decubitus wound ,  Surgery debrided, not likely the cause of the fever unless she is bacteremic.   4. OK disposition 1 week fluco 200 po daily and 5 days po augmnentin 875 bid               Interval History:   no cp, n/v/d, or abd pain.cough some back pain, BC neg, UA abnormal here and [place], UC here candida ? noUC at NH wd debrided  Short of breath, dense cough  No clear pneumonia now off O2               Medications:       amitriptyline  100 mg Oral At Bedtime     atorvastatin  10 mg Oral Daily     baclofen  10 mg Oral TID     enoxaparin  40 mg Subcutaneous Q24H     fluconazole  400 mg Intravenous Q24H     furosemide  40 mg Oral Daily     gabapentin  600 mg Oral TID     insulin aspart  1-3 Units Subcutaneous TID AC     insulin aspart  1-3 Units Subcutaneous At Bedtime     ipratropium - albuterol 0.5 mg/2.5 mg/3 mL  1 vial Nebulization 4x Daily     metoprolol succinate ER  50 mg Oral Daily     multivitamin w/minerals  1 tablet Oral Daily     piperacillin-tazobactam  3.375 g Intravenous Q6H     polyethylene glycol  17 g Oral Daily     senna-docusate  3 tablet Oral BID     sodium hypochlorite   Topical BID                  Physical Exam:   Blood pressure 139/60, pulse 87, temperature 97.9  F (36.6  C), temperature source Oral, resp. rate 18, weight 114.4 kg (252 lb 3.2 oz), last menstrual period 12/11/2011, SpO2 91 %, not currently breastfeeding.  Wt Readings from Last 2 Encounters:   04/14/19 114.4 kg (252 lb 3.2 oz)   04/10/19 114.8 kg (253 lb)     Vital Signs with Ranges  Temp:  [97.9  F (36.6  C)-100.4  F (38  C)] 97.9  F (36.6  C)  Heart Rate:  [86-97] 86  Resp:  [16-20] 18  BP: (124-147)/(59-63) 139/60  SpO2:  [88 %-95 %] 91 %    Constitutional:  ill appearing   Lungs: Congestionto auscultation bilaterally, no crackles or wheezing   Cardiovascular: Regular rate and rhythm, normal S1 and S2, and no murmur noted   Abdomen: Normal bowel sounds, soft, non-distended, non-tender   Skin: No rashes, no cyanosis, same  Edema and red legs   Other:           Data:   All microbiology laboratory data reviewed.  Recent Labs   Lab Test 04/15/19  0716 04/14/19  0715 04/13/19  0716   WBC 16.2* 15.0* 14.7*   HGB 9.0* 8.7* 9.0*   HCT 29.8* 28.7* 30.4*   MCV 87 88 87    319 295     Recent Labs   Lab Test 04/15/19  0716 04/14/19  0715 04/13/19  0716   CR 0.57 0.55 0.51*     Recent Labs   Lab Test 11/01/17  1623   SED 39*      Recent Labs   Lab Test 04/10/19  1913 04/10/19  1855 04/10/19  1707 03/16/19  1550 03/16/19  1455 03/16/19  1222 03/16/19  1200 02/04/19  2130 01/31/19  0410   CULT No growth after 5 days No growth after 5 days 10,000 to 50,000 colonies/mL  Candida albicans / dubliniensis  Candida albicans and Candida dubliniensis are not routinely speciated  Susceptibility testing not routinely done  *  <1000 colonies/mL  mixed urogenital geovanna  Susceptibility testing not routinely done   No growth Methicillin resistant Staphylococcus aureus (MRSA) isolated  This isolate DOES NOT demonstrate inducible clindamycin resistance in vitro. Clindamycin   is susceptible and could be used when indicated, however, erythromycin is resistant and   should not be used.  *  Olive Donald RN 5th floor notified of MRSA 3/20/19 0800 dg 50,000 to 100,000 colonies/mL  Pseudomonas aeruginosa  *  <10,000 colonies/mL  Enterococcus faecium (VRE)  *  Critical Value/Significant Value, preliminary result only, called to and read back by  Merline Bryan RN at [facility] MS5 at 2:30pm 3/18/2019 ()   No growth Cultured on the 2nd day of incubation:  Staphylococcus epidermidis  *  Critical Value/Significant Value, preliminary result only, called to and read back by  Claudia Marsh RN 1531 2/6/19. MS    (Note)  POSITIVE for STAPHYLOCOCCUS EPIDERMIDIS and POSITIVE for the mecA  gene (resistant to methicillin) by [employer] multiplex nucleic acid  test. Final identification and antimicrobial susceptibility testing  will be verified by standard methods.      Specimen tested with Verigene multiplex, gram-positive blood culture  nucleic acid test for the following targets: Staph aureus, Staph  epidermidis, Staph lugdunensis, other Staph species, Enterococcus  faecalis, Enterococcus faecium, Streptococcus species, S. agalactiae,  S. anginosus grp., S. pneumoniae, S. pyogenes, Listeria sp., mecA  (methicillin resistance) and Randolph/B (vancomycin  resistance).      Critical Value/Significant Value called to and read back by FREDO ESPINO 02.06.2019 AT 7.43AM,ZG    No growth Moderate growth  Normal geovanna

## 2019-04-15 NOTE — PLAN OF CARE
Discharge Planner PT   Patient plan for discharge: TCU  Current status: PT - Pt performed seated thera exs to L LE x 15 reps & AAROM to R LE x 15 reps with mod assist ( heel/toe raises, marching, LAQs and hip add/abd.  Pt tolerated fairly well.  Pt also performed sit to/from stand x 4 with mod assist of 2 for 3 sit to stand  & max to mod of 2 with sit to stand.  Pt performed sit to stand on the count of 3 with rocking.  Pt was able to reach for ww with L UE for the 2 & 3rd time & only able to stand ~ 3 sec.  Pt was able to reach with B UEs for ww with standing with ww for ~ 5 sec.  Pt was unable to reach back for chair with stand to sit. Vcs were needed for repositioning B feet prior to standing and min assist for repositioning R only.   Barriers to return to prior living situation: Level of assist for bed mobility and transfers and general deconditioning/ activity tolerance.   Recommendations for discharge: TCU per plan established by the PT.    Rationale for recommendations: Pt would benefit from further rehab for LE strengthening, her activity tolerance, and her bed mobility/ tranfers.        Entered by: Velvet Lozada 04/15/2019 12:04 PM

## 2019-04-15 NOTE — PLAN OF CARE
Alert and oriented x4. Up with a lift. Stayed in bed this shift. Oxycodone given x1 for buttock, leg, hip pain. Repositioned q2h. Bobo in place for retention. New IV this shift. Edema to lower extremities. Sats dropped to high 80's over night. Sating above 92% on 1LNC. Buttock wound dressing CDI. Zosyn for abx.

## 2019-04-15 NOTE — PROGRESS NOTES
Infection Prevention:    Patient requires Contact precautions because of VRE: Urine, 3/16/19 and MRSA: Nares, 3/16/19. Please contact Infection Prevention with any questions/concerns at *[phone].    Cecelia Matta, ICP

## 2019-04-15 NOTE — PROGRESS NOTES
[facility]  Hospitalist Progress Note  Jarvis Rutherford MD 04/15/19    Reason for Stay (Diagnosis): Sepsis         Assessment and Plan:      Summary of Stay: Amanda Richardson is a 53 year old female with past medical history significant for MS, chronic pain syndrome with pain pump in place, chronic decubitus and inguinal ulcers/wounds, type II DM, urinary retention, obesity, likely obesity hypoventilation syndrome, constipation, HTN, anemia, right femur fracture, and chronic leukocytosis with recent admission for urosepsis due to a ureteral stone who is now admitted on 4/10/2019 with fever and leukocytosis consistent with sepsis of unclear etiology.  Started on Zosyn empirically and ID was consulted.  Blood cultures no growth today.  Urine culture growing yeast so IV fluconazole was added.  General surgery was consulted to evaluate her decubitus ulcers which show some necrotic tissue, but did not seem to be the source of infection.  Has undergone local debridement of these ulcers while here.  Having urinary retention and a Bobo catheter was placed for this and for ongoing wound cares.  Resumed her Lasix for lower extremity edema.  PT and OT consulted, TCU when placement found.    Problem List/Assessment and Plan:   Sepsis of unclear infectious source:  Multiple potential sources of infection. WBC to 20K associated with malaise, URI symptoms, increased LE edema and redness. Lactate is normal and BP is stable. CXR with possible infiltrate; her symptoms are more consistent with URI than pneumonia. She also has a chronic decubitus ulcer which could be a source although does not look infected per surgery.  LE edema and redness more likely venous stasis than cellulitis, although all potential sources.  Urine culture growing yeast.  She does have a chronic leukocytosis so unclear that this will ever normalize.  Intermittent low grade fevers.  Cough now productive of more sputum that she says is green.  -ID  consulted  -Continue IV Piperacillin/Tazobactam today  -Blood cultures NGTD  -Urine culture growing yeast. IV fluconazole added given recent instrumentation and candida in urine   -need to determine antibiotic plan for discharge     Urinary retention: Patient reports having ramos intermittently in the past. Having high PVR and requiring straight cath. Given her significant skin issues and urinary retention, will place ramos for now.  Will keep in place and needs follow-up with urology as outpatient.      Chronic venous stasis, lymphedema: At risk for cellulitis slight pink color to her legs, but they do not appear infected at present.  Does have bilateral 1+ lower extremity edema.  Reports negative DVT US at TCU within the past few days  -Resumed Lasix 40 mg p.o. Daily      Sacral decubitus ulcer, present on admission  -WOC consult, otherwise treatment as above   -asked surgery to see, patient had some small areas of necrotic tissue that has been debrided multiple times here, although felt less likely to be the source of infection   -D/w patient that good long term wound care in clinic, likely future debridement, and off loading pressure      Multiple sclerosis: baclofen pump in situ, reports refilled within the past 2 weeks.  -Continue PTA PO baclofen and baclofen pump     Chronic pain syndrome: continue PTA neurontin, PRN oxycodone, amitriptyline  -Patient reports that she has been on oxycodone 15 mg q6h prn for many years although recently it was decreased in half very recently at [facility]. She is quiet concerned about this and reports uncontrolled pain, wants to have her regular dose.  Increased back to baseline dose and so far no worsening somnolence.    Diabetes mellitus type 2:  controlled on PO meds.  Hold metformin for sepsis.  Check glucose QID AC, low dose SSI     HTN: Blood pressure 100-120 systolic while here.  -Continue metoprolol XL 50 mg daily  -Holding PTA losartan 50 mg daily and HCTZ 12.5 mg  daily  -Resumed Lasix 40 mg p.o. Daily     Probable obesity hypoventilation: Monitor oxygenation and signs of oversedation.  Intermittent mild hypoxia when sleeping needing 1 L O2 occasionally.    Chronic constipation: miralax,senna as per PTA regimen    History of right femur fracture: Incidentally found to have a previous right femur fracture on admission 2 months ago.  Does have some chronic right hip pain.    HLD: Continue atorvastatin.    Chronic anemia: Hemoglobin at baseline of 8-10.    DVT Prophylaxis: Enoxaparin (Lovenox) SQ  Code Status: Full Code  FEN: Consistent carbohydrate moderate  Discharge Dispo: TCU versus eventual long-term care.  PT, OT, social work consult  Estimated Disch Date / # of Days until Disch: once placement found.  Need antibiotic plan.        Interval History (Subjective):      Cough persistent.  Temp of 100.4 briefly overnight.  Brief O2 desaturations when asleep.  Back pain present.  Tolerating po.                  Physical Exam:      Last Vital Signs:  /60 (BP Location: Right arm)   Pulse 87   Temp 97.9  F (36.6  C) (Oral)   Resp 16   Wt 114.4 kg (252 lb 3.2 oz)   LMP 12/11/2011   SpO2 92%   BMI 39.50 kg/m      Intake/Output Summary (Last 24 hours) at 4/15/2019 1108  Last data filed at 4/15/2019 1102  Gross per 24 hour   Intake 780 ml   Output 5895 ml   Net -5115 ml       Constitutional: Awake, NAD   Eyes: sclera white   HEENT:  MMM  Respiratory: Crackles at bases.  No wheeze  Cardiovascular: RRR.  No murmur   GI: non-tender, not distended, bowel sounds present  Skin: Wrinkling skin lower extremities, pink area to bilateral legs.  L decubitus ulcer  Musculoskeletal/extremities: 1+ edema bilateral lower extremities  Neurologic: A&O  Psychiatric: calm, cooperative         Medications:      All current medications were reviewed with changes reflected in problem list.         Data:      All new lab and imaging data was reviewed.   Labs:  Recent Labs   Lab 04/10/19  4903  04/10/19  1855 04/10/19  1707   CULT No growth after 5 days No growth after 5 days 10,000 to 50,000 colonies/mL  Candida albicans / dubliniensis  Candida albicans and Candida dubliniensis are not routinely speciated  Susceptibility testing not routinely done  *  <1000 colonies/mL  mixed urogenital geovanna  Susceptibility testing not routinely done       Recent Labs   Lab 04/15/19  0716 04/14/19  0715 04/13/19  0716    137 137   POTASSIUM 3.6 3.6 3.9   CHLORIDE 103 103 102   CO2 30 34* 35*   ANIONGAP 6 <1* <1*   * 109* 106*   BUN 13 12 11   CR 0.57 0.55 0.51*   GFRESTIMATED >90 >90 >90   GFRESTBLACK >90 >90 >90   MARY 8.4* 8.1* 8.3*     Recent Labs   Lab 04/15/19  0716 04/14/19  0715 04/13/19  0716   WBC 16.2* 15.0* 14.7*   HGB 9.0* 8.7* 9.0*   HCT 29.8* 28.7* 30.4*   MCV 87 88 87    319 295      Imaging:   None today      Jarvis Rutherford MD

## 2019-04-16 ENCOUNTER — APPOINTMENT (OUTPATIENT)
Dept: OCCUPATIONAL THERAPY | Facility: CLINIC | Age: 54
DRG: 853 | End: 2019-04-16
Payer: COMMERCIAL

## 2019-04-16 LAB
ANION GAP SERPL CALCULATED.3IONS-SCNC: 5 MMOL/L (ref 3–14)
BACTERIA SPEC CULT: NO GROWTH
BACTERIA SPEC CULT: NO GROWTH
BUN SERPL-MCNC: 14 MG/DL (ref 7–30)
CALCIUM SERPL-MCNC: 8.4 MG/DL (ref 8.5–10.1)
CHLORIDE SERPL-SCNC: 102 MMOL/L (ref 94–109)
CO2 SERPL-SCNC: 32 MMOL/L (ref 20–32)
CREAT SERPL-MCNC: 0.58 MG/DL (ref 0.52–1.04)
ERYTHROCYTE [DISTWIDTH] IN BLOOD BY AUTOMATED COUNT: 17.2 % (ref 10–15)
GFR SERPL CREATININE-BSD FRML MDRD: >90 ML/MIN/{1.73_M2}
GLUCOSE BLDC GLUCOMTR-MCNC: 107 MG/DL (ref 70–99)
GLUCOSE BLDC GLUCOMTR-MCNC: 111 MG/DL (ref 70–99)
GLUCOSE BLDC GLUCOMTR-MCNC: 120 MG/DL (ref 70–99)
GLUCOSE BLDC GLUCOMTR-MCNC: 145 MG/DL (ref 70–99)
GLUCOSE SERPL-MCNC: 115 MG/DL (ref 70–99)
HCT VFR BLD AUTO: 31.5 % (ref 35–47)
HGB BLD-MCNC: 9.5 G/DL (ref 11.7–15.7)
Lab: NORMAL
Lab: NORMAL
MCH RBC QN AUTO: 26.2 PG (ref 26.5–33)
MCHC RBC AUTO-ENTMCNC: 30.2 G/DL (ref 31.5–36.5)
MCV RBC AUTO: 87 FL (ref 78–100)
PLATELET # BLD AUTO: 404 10E9/L (ref 150–450)
POTASSIUM SERPL-SCNC: 3.9 MMOL/L (ref 3.4–5.3)
RBC # BLD AUTO: 3.63 10E12/L (ref 3.8–5.2)
SODIUM SERPL-SCNC: 139 MMOL/L (ref 133–144)
SPECIMEN SOURCE: NORMAL
SPECIMEN SOURCE: NORMAL
WBC # BLD AUTO: 18.1 10E9/L (ref 4–11)

## 2019-04-16 PROCEDURE — 99231 SBSQ HOSP IP/OBS SF/LOW 25: CPT | Performed by: SURGERY

## 2019-04-16 PROCEDURE — 25000125 ZZHC RX 250: Performed by: INTERNAL MEDICINE

## 2019-04-16 PROCEDURE — 00000146 ZZHCL STATISTIC GLUCOSE BY METER IP

## 2019-04-16 PROCEDURE — 36415 COLL VENOUS BLD VENIPUNCTURE: CPT | Performed by: INTERNAL MEDICINE

## 2019-04-16 PROCEDURE — 25000132 ZZH RX MED GY IP 250 OP 250 PS 637: Performed by: INTERNAL MEDICINE

## 2019-04-16 PROCEDURE — G0463 HOSPITAL OUTPT CLINIC VISIT: HCPCS

## 2019-04-16 PROCEDURE — 40000275 ZZH STATISTIC RCP TIME EA 10 MIN

## 2019-04-16 PROCEDURE — 94640 AIRWAY INHALATION TREATMENT: CPT | Mod: 76

## 2019-04-16 PROCEDURE — 25000128 H RX IP 250 OP 636: Performed by: PHYSICIAN ASSISTANT

## 2019-04-16 PROCEDURE — 25000128 H RX IP 250 OP 636: Performed by: INTERNAL MEDICINE

## 2019-04-16 PROCEDURE — 12000000 ZZH R&B MED SURG/OB

## 2019-04-16 PROCEDURE — 80048 BASIC METABOLIC PNL TOTAL CA: CPT | Performed by: INTERNAL MEDICINE

## 2019-04-16 PROCEDURE — 99232 SBSQ HOSP IP/OBS MODERATE 35: CPT | Performed by: INTERNAL MEDICINE

## 2019-04-16 PROCEDURE — 97110 THERAPEUTIC EXERCISES: CPT | Mod: GO | Performed by: OCCUPATIONAL THERAPIST

## 2019-04-16 PROCEDURE — 94640 AIRWAY INHALATION TREATMENT: CPT

## 2019-04-16 PROCEDURE — 85027 COMPLETE CBC AUTOMATED: CPT | Performed by: INTERNAL MEDICINE

## 2019-04-16 PROCEDURE — 25000132 ZZH RX MED GY IP 250 OP 250 PS 637: Performed by: PHYSICIAN ASSISTANT

## 2019-04-16 RX ORDER — LOSARTAN POTASSIUM 50 MG/1
50 TABLET ORAL DAILY
Status: DISCONTINUED | OUTPATIENT
Start: 2019-04-16 | End: 2019-04-17 | Stop reason: HOSPADM

## 2019-04-16 RX ADMIN — Medication: at 10:22

## 2019-04-16 RX ADMIN — OXYCODONE HYDROCHLORIDE 15 MG: 5 TABLET ORAL at 00:14

## 2019-04-16 RX ADMIN — TAZOBACTAM SODIUM AND PIPERACILLIN SODIUM 3.38 G: 375; 3 INJECTION, SOLUTION INTRAVENOUS at 06:00

## 2019-04-16 RX ADMIN — GABAPENTIN 600 MG: 600 TABLET, FILM COATED ORAL at 21:14

## 2019-04-16 RX ADMIN — IPRATROPIUM BROMIDE AND ALBUTEROL SULFATE 3 ML: .5; 3 SOLUTION RESPIRATORY (INHALATION) at 16:02

## 2019-04-16 RX ADMIN — OXYCODONE HYDROCHLORIDE 15 MG: 5 TABLET ORAL at 19:03

## 2019-04-16 RX ADMIN — BACLOFEN 10 MG: 10 TABLET ORAL at 16:46

## 2019-04-16 RX ADMIN — Medication: at 21:13

## 2019-04-16 RX ADMIN — TAZOBACTAM SODIUM AND PIPERACILLIN SODIUM 3.38 G: 375; 3 INJECTION, SOLUTION INTRAVENOUS at 17:57

## 2019-04-16 RX ADMIN — AMITRIPTYLINE HYDROCHLORIDE 100 MG: 50 TABLET, FILM COATED ORAL at 21:14

## 2019-04-16 RX ADMIN — TAZOBACTAM SODIUM AND PIPERACILLIN SODIUM 3.38 G: 375; 3 INJECTION, SOLUTION INTRAVENOUS at 12:46

## 2019-04-16 RX ADMIN — IPRATROPIUM BROMIDE AND ALBUTEROL SULFATE 3 ML: .5; 3 SOLUTION RESPIRATORY (INHALATION) at 11:47

## 2019-04-16 RX ADMIN — GABAPENTIN 600 MG: 600 TABLET, FILM COATED ORAL at 16:46

## 2019-04-16 RX ADMIN — METOPROLOL SUCCINATE 50 MG: 50 TABLET, EXTENDED RELEASE ORAL at 10:21

## 2019-04-16 RX ADMIN — IPRATROPIUM BROMIDE AND ALBUTEROL SULFATE 3 ML: .5; 3 SOLUTION RESPIRATORY (INHALATION) at 20:43

## 2019-04-16 RX ADMIN — TAZOBACTAM SODIUM AND PIPERACILLIN SODIUM 3.38 G: 375; 3 INJECTION, SOLUTION INTRAVENOUS at 23:42

## 2019-04-16 RX ADMIN — ATORVASTATIN CALCIUM 10 MG: 10 TABLET, FILM COATED ORAL at 10:21

## 2019-04-16 RX ADMIN — GABAPENTIN 600 MG: 600 TABLET, FILM COATED ORAL at 10:21

## 2019-04-16 RX ADMIN — ENOXAPARIN SODIUM 40 MG: 40 INJECTION SUBCUTANEOUS at 21:14

## 2019-04-16 RX ADMIN — MULTIPLE VITAMINS W/ MINERALS TAB 1 TABLET: TAB at 10:20

## 2019-04-16 RX ADMIN — BACLOFEN 10 MG: 10 TABLET ORAL at 21:14

## 2019-04-16 RX ADMIN — FLUCONAZOLE 400 MG: 2 INJECTION, SOLUTION INTRAVENOUS at 13:55

## 2019-04-16 RX ADMIN — FUROSEMIDE 40 MG: 40 TABLET ORAL at 10:21

## 2019-04-16 RX ADMIN — OXYCODONE HYDROCHLORIDE 15 MG: 5 TABLET ORAL at 05:59

## 2019-04-16 RX ADMIN — OXYCODONE HYDROCHLORIDE 15 MG: 5 TABLET ORAL at 12:46

## 2019-04-16 RX ADMIN — IPRATROPIUM BROMIDE AND ALBUTEROL SULFATE 3 ML: .5; 3 SOLUTION RESPIRATORY (INHALATION) at 08:17

## 2019-04-16 RX ADMIN — LOSARTAN POTASSIUM 50 MG: 50 TABLET, FILM COATED ORAL at 12:55

## 2019-04-16 RX ADMIN — BACLOFEN 10 MG: 10 TABLET ORAL at 10:20

## 2019-04-16 RX ADMIN — TAZOBACTAM SODIUM AND PIPERACILLIN SODIUM 3.38 G: 375; 3 INJECTION, SOLUTION INTRAVENOUS at 00:14

## 2019-04-16 ASSESSMENT — ACTIVITIES OF DAILY LIVING (ADL)
ADLS_ACUITY_SCORE: 29

## 2019-04-16 NOTE — PROGRESS NOTES
Discharge Planner   Discharge Plans in progress: Fabio Phipps is working on prior auth auth for TCU placement   Barriers to discharge plan: Pending ins.   Follow up plan: Will update pt and spouse Fernando once insurance has approved placement.        Entered by: Corinne C. White 04/16/2019 1:58 PM

## 2019-04-16 NOTE — PROGRESS NOTES
CM: Met with pt to discuss TCU placement options. Pt is not wiling to return to [facility] due to care concerns she had with staff and the Nurse Manager.. PT is still needing PT.OT and wound care twice per day.  PT has H.O MS, would be home alone during the day while spouse is at work    Discharge Planner   Discharge Plans in progress: PT is willing to look as far as [place] for TCU placement and aware that options are limited due to her ins.  Barriers to discharge plan: PT is medically ready to discharge. Discussed with MD   Follow up plan: Referrals send to facilities as far as [place], [place], and [place] side of [facility].     [facility] and [facility] have no immediate female beds at this time.        Entered by: Corinne C. White 04/16/2019 10:42 AM

## 2019-04-16 NOTE — PROGRESS NOTES
[facility]  Hospitalist Progress Note  Jarvis Rutherford MD 04/16/19    Reason for Stay (Diagnosis): Sepsis         Assessment and Plan:      Summary of Stay: Amanda Richardson is a 53 year old female with past medical history significant for MS, chronic pain syndrome with pain pump in place, chronic decubitus and inguinal ulcers/wounds, type II DM, urinary retention, obesity, likely obesity hypoventilation syndrome, constipation, HTN, anemia, right femur fracture, and chronic leukocytosis with recent admission for urosepsis due to a ureteral stone who is now admitted on 4/10/2019 with fever and leukocytosis consistent with sepsis of unclear etiology.  Started on Zosyn empirically and ID was consulted.  Blood cultures no growth today.  Urine culture growing yeast so IV fluconazole was added.  General surgery was consulted to evaluate her decubitus ulcers which show some necrotic tissue, but did not seem to be the source of infection.  Has undergone local debridement of these ulcers while here.  Having urinary retention and a Bobo catheter was placed for this and for ongoing wound cares.  Resumed her Lasix for lower extremity edema.  PT and OT consulted, TCU when placement found.    Problem List/Assessment and Plan:   Sepsis of unclear infectious source:  Multiple potential sources of infection. WBC to 20K associated with malaise, URI symptoms, increased LE edema and redness. Lactate is normal and BP is stable. CXR with possible infiltrate; her symptoms are more consistent with URI than pneumonia. She also has a chronic decubitus ulcer which could be a source although does not look infected per surgery.  LE edema and redness more likely venous stasis than cellulitis, although all potential sources.  Urine culture growing yeast.  She does have a chronic leukocytosis so unclear that this will ever normalize.  Intermittent low grade fevers.  Cough now productive of more sputum that she says is green.  -ID  consulted  -Blood cultures NGTD  -Urine culture growing yeast  -Continue IV Piperacillin/Tazobactam and IV fluconazole, on discharge change to Augmentin 875 mg twice daily and p.o. fluconazole 200 mg daily to finish on 4/22     Urinary retention: Patient reports having ramos intermittently in the past. Having high PVR and requiring straight cath. Given her significant skin issues and urinary retention, will place ramos for now.    -Will keep in place and needs follow-up with urology as outpatient.      Chronic venous stasis, lymphedema: At risk for cellulitis slight pink color to her legs, but they do not appear infected at present.  Does have bilateral 1+ lower extremity edema.  Reports negative DVT US at TCU within the past few days  -Resumed Lasix 40 mg p.o. Daily      Sacral decubitus ulcer, present on admission  -WOC consult, otherwise treatment as above   -asked surgery to see, patient had some small areas of necrotic tissue that has been debrided multiple times here, although felt less likely to be the source of infection   -D/w patient that good long term wound care in clinic, likely future debridement, and off loading pressure      Multiple sclerosis: baclofen pump in situ, reports refilled within the past 2 weeks.  -Continue PTA PO baclofen and baclofen pump     Chronic pain syndrome: continue PTA neurontin, PRN oxycodone, amitriptyline  -Patient reports that she has been on oxycodone 15 mg q6h prn for many years although recently it was decreased in half very recently at [facility]. She is quiet concerned about this and reports uncontrolled pain, wants to have her regular dose.  Increased back to baseline dose and so far no worsening somnolence.    Diabetes mellitus type 2:  controlled on PO meds.  Hold metformin for sepsis.  Check glucose QID AC, low dose SSI     HTN: Blood pressure 100-120 systolic while here.  -Continue metoprolol XL 50 mg daily  -Holding HCTZ 12.5 mg daily  -Resumed Lasix 40 mg p.o. Daily    -Resume losartan 50 mg daily    Probable obesity hypoventilation: Monitor oxygenation and signs of oversedation.  Intermittent mild hypoxia when sleeping needing 1 L O2 occasionally.    Chronic constipation: miralax,senna as per PTA regimen    History of right femur fracture: Incidentally found to have a previous right femur fracture on admission 2 months ago.  Does have some chronic right hip pain.    HLD: Continue atorvastatin.    Chronic anemia: Hemoglobin at baseline of 8-10.    DVT Prophylaxis: Enoxaparin (Lovenox) SQ  Code Status: Full Code  FEN: Consistent carbohydrate moderate  Discharge Dispo: TCU versus eventual long-term care.  PT, OT, social work consult  Estimated Disch Date / # of Days until Disch: Medically stable for discharge once placement found and insurance authorization        Interval History (Subjective):      Cough better today.  Denies shortness of breath.  Intermittently required small amount of oxygen with sleep.  Back and shoulder pain similar to baseline.  Afebrile.                  Physical Exam:      Last Vital Signs:  /63 (BP Location: Right arm)   Pulse 89   Temp 98.3  F (36.8  C) (Oral)   Resp 18   Wt 114.4 kg (252 lb 3.2 oz)   LMP 12/11/2011   SpO2 91%   BMI 39.50 kg/m      Intake/Output Summary (Last 24 hours) at 4/16/2019 1243  Last data filed at 4/16/2019 1138  Gross per 24 hour   Intake 990 ml   Output 3575 ml   Net -2585 ml       Constitutional: Awake, NAD   Eyes: sclera white   HEENT:  MMM  Respiratory: Crackles at bases.  No wheeze  Cardiovascular: RRR.  No murmur   GI: non-tender, not distended, bowel sounds present  Skin: Wrinkling skin lower extremities, pink area to bilateral legs well within pen marking.  L decubitus ulcer  Musculoskeletal/extremities: 1+ edema bilateral lower extremities  Neurologic: A&O  Psychiatric: calm, cooperative, flat affect         Medications:      All current medications were reviewed with changes reflected in problem list.          Data:      All new lab and imaging data was reviewed.   Labs:  Recent Labs   Lab 04/10/19  1913 04/10/19  1855 04/10/19  1707   CULT No growth No growth 10,000 to 50,000 colonies/mL  Candida albicans / dubliniensis  Candida albicans and Candida dubliniensis are not routinely speciated  Susceptibility testing not routinely done  *  <1000 colonies/mL  mixed urogenital geovanna  Susceptibility testing not routinely done       Recent Labs   Lab 04/16/19  0750 04/15/19  0716 04/14/19  0715    139 137   POTASSIUM 3.9 3.6 3.6   CHLORIDE 102 103 103   CO2 32 30 34*   ANIONGAP 5 6 <1*   * 115* 109*   BUN 14 13 12   CR 0.58 0.57 0.55   GFRESTIMATED >90 >90 >90   GFRESTBLACK >90 >90 >90   MARY 8.4* 8.4* 8.1*     Recent Labs   Lab 04/16/19  0750 04/15/19  0716 04/14/19  0715   WBC 18.1* 16.2* 15.0*   HGB 9.5* 9.0* 8.7*   HCT 31.5* 29.8* 28.7*   MCV 87 87 88    354 319      Imaging:   None today      Jarvis Rutherford MD

## 2019-04-16 NOTE — PROGRESS NOTES
[facility]  Infectious Disease Progress Note          Assessment and Plan:   IMPRESSION:   1.  A 53-year-old female well known to me from recent hospitalization, admitted from [facility], onset of fever, chills and leukocytosis at nursing home with etiology unclear.  Numerous possible issues, has abnormal urine currently and history of urine infections (see below), respiratory symptoms including some shortness of breath, nasal congestion and sore throat, large worsening decubitus wound with drainage, bilateral leg erythema, current cultures are pending.   2.  Abnormal urinalysis, now in a nursing home, apparent positive culture, but not available in our system, get the outside culture and see what current urine has shown, had recent major urine infection with obstruction which has been relieved, off antibiotics currently, had vancomycin-resistant enterococcus and Pseudomonas in those cultures.   3.  Worsening decubitus wound, some drainage, possible source of infection.   4.  Respiratory symptoms, question upper respiratory infection, possibly some infiltrates, negative influenza screening and clinical picture not suggesting that.   5.  Multiple sclerosis.   6.  Diabetes mellitus.   7.  Chronic stasis changes in both legs, bilateral erythema.  Doubt this is cellulitis.   8.  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      RECOMMENDATIONS:   1.  Zosyn empiric day 6+, flucon day5  2.  Improved clinical course and cx same   3.  Decubitus wound ,  Surgery debrided, not likely the cause of the fever unless she is bacteremic.   4. OK disposition 1 week fluco 200 po daily and 5 days po augmnentin 875 bid               Interval History:   no cp, n/v/d, or abd pain.cough some back pain, BC neg, UA abnormal here and [place], UC here candida ? noUC at NH wd debrided  Short of breath, dense cough  No clear pneumonia now off O2               Medications:       amitriptyline  100 mg Oral At Bedtime     atorvastatin  10 mg Oral Daily     baclofen  10 mg Oral TID     enoxaparin  40 mg Subcutaneous Q24H     fluconazole  400 mg Intravenous Q24H     furosemide  40 mg Oral Daily     gabapentin  600 mg Oral TID     insulin aspart  1-3 Units Subcutaneous TID AC     insulin aspart  1-3 Units Subcutaneous At Bedtime     ipratropium - albuterol 0.5 mg/2.5 mg/3 mL  1 vial Nebulization 4x Daily     losartan  50 mg Oral Daily     metoprolol succinate ER  50 mg Oral Daily     multivitamin w/minerals  1 tablet Oral Daily     piperacillin-tazobactam  3.375 g Intravenous Q6H     polyethylene glycol  17 g Oral Daily     senna-docusate  3 tablet Oral BID     sodium hypochlorite   Topical BID                  Physical Exam:   Blood pressure 146/66, pulse 89, temperature 98.1  F (36.7  C), temperature source Oral, resp. rate 16, weight 114.4 kg (252 lb 3.2 oz), last menstrual period 12/11/2011, SpO2 91 %, not currently breastfeeding.  Wt Readings from Last 2 Encounters:   04/14/19 114.4 kg (252 lb 3.2 oz)   04/10/19 114.8 kg (253 lb)     Vital Signs with Ranges  Temp:  [98.1  F (36.7  C)-99.5  F (37.5  C)] 98.1  F (36.7  C)  Pulse:  [89] 89  Heart Rate:  [85-95] 86  Resp:  [16-18] 16  BP: (131-148)/(57-66) 146/66  SpO2:  [89 %-93 %] 91 %    Constitutional:  ill appearing   Lungs: Congestionto auscultation bilaterally, no crackles or wheezing   Cardiovascular: Regular rate and rhythm, normal S1 and S2, and no murmur noted   Abdomen: Normal bowel sounds, soft, non-distended, non-tender   Skin: No rashes, no cyanosis, same  Edema and red legs   Other:           Data:   All microbiology laboratory data reviewed.  Recent Labs   Lab Test 04/16/19  0750 04/15/19  0716 04/14/19  0715   WBC 18.1* 16.2* 15.0*   HGB 9.5* 9.0* 8.7*   HCT 31.5* 29.8* 28.7*   MCV 87 87 88    354 319     Recent Labs   Lab Test 04/16/19  0750 04/15/19  0716 04/14/19  0715   CR 0.58 0.57 0.55      Recent Labs   Lab Test 11/01/17  1623   SED 39*     Recent Labs   Lab Test 04/10/19  1913 04/10/19  1855 04/10/19  1707 03/16/19  1550 03/16/19  1455 03/16/19  1222 03/16/19  1200 02/04/19  2130 01/31/19  0410   CULT No growth No growth 10,000 to 50,000 colonies/mL  Candida albicans / dubliniensis  Candida albicans and Candida dubliniensis are not routinely speciated  Susceptibility testing not routinely done  *  <1000 colonies/mL  mixed urogenital geovanna  Susceptibility testing not routinely done   No growth Methicillin resistant Staphylococcus aureus (MRSA) isolated  This isolate DOES NOT demonstrate inducible clindamycin resistance in vitro. Clindamycin   is susceptible and could be used when indicated, however, erythromycin is resistant and   should not be used.  *  Olive Donald RN 5th floor notified of MRSA 3/20/19 0800 dg 50,000 to 100,000 colonies/mL  Pseudomonas aeruginosa  *  <10,000 colonies/mL  Enterococcus faecium (VRE)  *  Critical Value/Significant Value, preliminary result only, called to and read back by  Merline Bryan RN at [facility] MS5 at 2:30pm 3/18/2019 (MC)   No growth Cultured on the 2nd day of incubation:  Staphylococcus epidermidis  *  Critical Value/Significant Value, preliminary result only, called to and read back by  Claudia Marsh RN [id number] 2/6/19. MS    (Note)  POSITIVE for STAPHYLOCOCCUS EPIDERMIDIS and POSITIVE for the mecA  gene (resistant to methicillin) by Decalog multiplex nucleic acid  test. Final identification and antimicrobial susceptibility testing  will be verified by standard methods.      Specimen tested with Verigene multiplex, gram-positive blood culture  nucleic acid test for the following targets: Staph aureus, Staph  epidermidis, Staph lugdunensis, other Staph species, Enterococcus  faecalis, Enterococcus faecium, Streptococcus species, S. agalactiae,  S. anginosus grp., S. pneumoniae, S. pyogenes, Listeria sp., mecA  (methicillin resistance) and Randolph/B  (vancomycin resistance).      Critical Value/Significant Value called to and read back by FREDO ESPINO 02.06.2019 AT 7.43AM,ZG    No growth Moderate growth  Normal geovanna

## 2019-04-16 NOTE — PLAN OF CARE
Pt rested quiet this shift. Oxy given for leg and hip pain with good relief, repositioned q2hr from side to side, dressing on buttock ulcer intact, inc stool x1.

## 2019-04-16 NOTE — PLAN OF CARE
VSS. Pain managed with oxycodone and ice. Up to chair with A2 mechanical lift, repositioned q2h. Wound cares done x1 per POC. Tolerating diet, ramos in place, multiple incontinent BM. Discharge pending TCU placement. Will continue to monitor.

## 2019-04-16 NOTE — PLAN OF CARE
Vitals; T max: 99.1 (oral), RA  Alert and oriented x 4  Managing pain with oxycodone, baclofen, and ice  Ls: diminished, productive/infrequent cough  Cardiac: WNL  Gi: BM x 1, soft/brown  Gu: ramos in place; output: 1025 mL this shift  Skin: wound care completed per orders, encouraging frequent repositioning, out of bed for dinner, prevalon boots for offloading    Plan: TCU placement, pending discharge date

## 2019-04-16 NOTE — PROGRESS NOTES
General Surgery Progress Note  SUBJECTIVE:  Afebrile. Comfortable in bed, no complaints. Denies pain now.  Takes Oxycodone 15 mg q 6 hours for chronic back/shoulder/hip pain as well as sacral wound discomfort.  Tolerating diet, last BM was yesterday.  + ramos    OBJECTIVE:  /63 (BP Location: Right arm)   Pulse 89   Temp 98.3  F (36.8  C) (Oral)   Resp 18   Wt 114.4 kg (252 lb 3.2 oz)   LMP 12/11/2011   SpO2 (!) 89%   BMI 39.50 kg/m       Wound - dressing change recently performed by nursing.  No surrounding erythema.      ASSESSMENT/PLAN:  Left buttock/sacral wound - continue BID dressing changes  Will likely need prolonged wound care and further debridement  SW following for TCU placement      Fozia Pool PA-C    Seen and agree,    Hemanth Card MD  [facility]

## 2019-04-16 NOTE — PROGRESS NOTES
[facility] Nurse Inpatient Adult Pressure Injury (PI) Wound Assessment     Follow-up assessment of PI(s) on pt's:   Buttocks     Data:   Patient History:      per MD note(s):  53 year old female who has extensive past medical history including recent hospitalizations for septic shock related to urinary source in setting of infected renal stone, multiple sclerosis, and diabetes admitted for malaise and leukocytosis.  Patient has been recovering in a TCU for the past few months following a hospitalization for septic shock due to urinary source from infected kidney stone.  She has since had management of stone and subsequent stent removal 3/27/2019.  She has most recently been treated for Pseudomonas UTI.      Pt is well known to [facility] and seen on last admission on 3/22 for Deep Tissue Pressure Injury to bilateral buttocks present on admission. Wound to left buttock is now evolving and debriding into an unstageable wound. Will recommend Surgery evaluation or transfer to [facility] for Plastic MD evaluation and treatment. Will plan to start Dakin's solution to debride and cleanse the wound.      Mattress: Pulsate  Current pressure relieving devices:  Pillows    Moisture Management:  Incontinence Protocol and Diaper for loose stools and urinary catheter    Catheter secured? yes    Current Diet / Nutrition:     Orders Placed This Encounter      Combination Diet Regular Diet Adult; 8785-5078 Calories: Moderate Consistent CHO (4-6 CHO units/meal)      Nutrition is following for wound healing protocol    Issac Risk Assessment  Sensory Perception: 3-->slightly limited    Moisture: 3-->occasionally moist   Activity: 2-->chairfast     Mobility: 2-->very limited   Nutrition: 3-->adequate   Friction and Shear: 1-->problem  Issac Score: 14       Labs:   Recent Labs   Lab Test 04/16/19  0750  04/10/19  1855  03/17/19  0615  02/04/19  0545  10/03/13  1614   ALBUMIN  --   --  2.4*  --  2.1*   < >  --    < > 3.6*   HGB  9.5*   < > 9.5*   < > 9.4*   < > 10.3*   < > 12.7   INR  --   --   --   --   --   --   --   --  0.93   WBC 18.1*   < > 20.6*   < > 31.1*   < > 21.6*   < > 14.4*   A1C  --   --   --   --  6.3*   < >  --    < >  --    CRP  --   --   --   --   --   --  57.7*   < >  --     < > = values in this interval not displayed.                                                                                                                        Pressure Injury Assessment  (location #1-2):   Bilateral Buttocks    Wound History:   See above.  Pt had large semi-runny stool in brief at assessment with continued oozing of stool.  Pt able to help a little with turns but needs assist x 2.  Pt has decreased sensation d/t co-morbidities.      Specific Dimensions (length x width x depth, in cm) :    1.  Right upper buttock: 0.5 x 1 x 0.2cm, 100% semi- moist creamy yellowish white tissue   2.  Left upper buttock/ sacral area: 5.5 x 3.5 x 4cm, 100% yellow spongy tissue with some pinkish tissue starting to show through laterally; Undermining from 10 - 5o'clock, mostly 1-2cm but up to 3cm near 10-11 o'clock    Periwound Skin: healing erosion of epidermis, blanchable erythema, mild maceration to gluteal cleft; center coccyx/sacrum intact     Drainage:    Amount: moderate,  Color: creamy and tan       Odor: mild    Pain:  minimal, sharp    4-16-19 buttocks             Intervention:     Patient's chart evaluated.      Issac Interventions:  Current Issac Interventions and Care Plan reviewed and updated, appropriate at this time.    Wound was assessed.    Wound Care:  Done per POC; visual assessment with staff of skin; balbina completed     Orders reviewed    Supplies  reviewed    Discussed plan of care with Patient and Nurse           Assessment:     Pressure Injury located on bilateral buttocks: both are Unstageable and present on admission.  Right buttock very small and resolving.      Status: left buttock wound still evolving; Dakin's  "helping some but pt has continued need of surgical debridement and Plastic MD follow up, Symptomatic due to pain and drainage.      Left deep perineal groin skin fold has small chronic wound 0.5x 0.5 x 0.2cm 100% red with scattered moist hypergranulation tissue along superior fold from hydradenitis. Will continue Triad paste to help debride and keep clean.         Plan:     Nursing to notify the Provider(s) and re-consult the [facility] Nurse if wound(s) deteriorate(s)or if the wound care plan needs reevaluation.      Plan for wound care to wound located on Bilateral Buttocks and Groin: BID and PRN  1. Spritz with wound spray, pat dry   2. Dust to open superficial wounds to groin and right buttocks with Stoma powder  3. Moisten one Kerlix fluff (squares) with Dakin's solution, squeeze out excess, unfluff and tuck into the Left Buttock wound  4. Apply layer of Triad paste (yellow tube) to romeo wound, cover right buttock and left groin wounds.  5. Avoid tape- tuck ABD in brief or don't use at all and change brief as needed    6. Pressure Injury Prevention (PIP) MEASURES:  1. If pt is refusing to turn or reposition they must be educated on the potential of injury due to not off loading.  Then this \"educated refusal\" needs to be documented as an \"educated refusal to turn/ reposition\" and document if alert, etc. Additionally, you MUST notify the charge nurse, nurse manager and the provider of the pt's refusal to reposition.  2. Follow Issac Risk recommendations  Nutrition following  Moisture and Incontinence issues- Critic aid paste   3. CHAIR POSITIONING: up for meals only then back to bed  REPOSITIONING   Fully off load every 1-1.5 hours (stand x 5 minutes or back to bed)   Shift side to side every 15 minutes  Chair cushion ([id number]) when up to chair (pillow does not actually off load)  4.  BED:    POSTIONING  No direct supine positioning, position only side to side  Keep HOB below 30 degrees  Reposition every 1-2 hours  Keep " heels elevated- one pillow under each leg from knee to heels, checking heels are free  Pulsate specialty mattress      WOC Nurse will return: weekly

## 2019-04-16 NOTE — PLAN OF CARE
Discharge Planner OT   Patient plan for discharge: TCU  Current status: patient declining transfers or LUE activity due to increased pain, but agreeable to RUE activity.  Patient participated in BUE strengthening (excepting L shoulder) without theraband, tolerated well.  Barriers to return to prior living situation: current level of assist, not at baseline with ADLs/transfers  Recommendations for discharge: TCU  Rationale for recommendations: patient would benefit from continued therapy to increase safety and independence with ADLs and functional transfers as patient is not yet at baseline.       Entered by: FRANK VAZQUEZ 04/16/2019 11:50 AM

## 2019-04-17 VITALS
DIASTOLIC BLOOD PRESSURE: 70 MMHG | WEIGHT: 252.2 LBS | BODY MASS INDEX: 39.5 KG/M2 | RESPIRATION RATE: 18 BRPM | SYSTOLIC BLOOD PRESSURE: 114 MMHG | TEMPERATURE: 98.3 F | OXYGEN SATURATION: 92 % | HEART RATE: 86 BPM

## 2019-04-17 LAB
GLUCOSE BLDC GLUCOMTR-MCNC: 108 MG/DL (ref 70–99)
GLUCOSE BLDC GLUCOMTR-MCNC: 108 MG/DL (ref 70–99)
GLUCOSE BLDC GLUCOMTR-MCNC: 109 MG/DL (ref 70–99)
GLUCOSE BLDC GLUCOMTR-MCNC: 111 MG/DL (ref 70–99)
GLUCOSE BLDC GLUCOMTR-MCNC: 188 MG/DL (ref 70–99)

## 2019-04-17 PROCEDURE — 25000132 ZZH RX MED GY IP 250 OP 250 PS 637: Performed by: PHYSICIAN ASSISTANT

## 2019-04-17 PROCEDURE — 94640 AIRWAY INHALATION TREATMENT: CPT

## 2019-04-17 PROCEDURE — 25000132 ZZH RX MED GY IP 250 OP 250 PS 637: Performed by: INTERNAL MEDICINE

## 2019-04-17 PROCEDURE — 99239 HOSP IP/OBS DSCHRG MGMT >30: CPT | Performed by: INTERNAL MEDICINE

## 2019-04-17 PROCEDURE — 25000125 ZZHC RX 250: Performed by: INTERNAL MEDICINE

## 2019-04-17 PROCEDURE — 25000128 H RX IP 250 OP 636: Performed by: PHYSICIAN ASSISTANT

## 2019-04-17 PROCEDURE — 00000146 ZZHCL STATISTIC GLUCOSE BY METER IP

## 2019-04-17 PROCEDURE — 94640 AIRWAY INHALATION TREATMENT: CPT | Mod: 76

## 2019-04-17 PROCEDURE — 40000275 ZZH STATISTIC RCP TIME EA 10 MIN

## 2019-04-17 RX ORDER — FLUCONAZOLE 200 MG/1
200 TABLET ORAL DAILY
DISCHARGE
Start: 2019-04-18 | End: 2019-06-10

## 2019-04-17 RX ORDER — OXYCODONE HYDROCHLORIDE 5 MG/1
15 TABLET ORAL EVERY 6 HOURS PRN
Qty: 30 TABLET | Refills: 0 | Status: SHIPPED | OUTPATIENT
Start: 2019-04-17 | End: 2019-07-01

## 2019-04-17 RX ORDER — ACETAMINOPHEN 325 MG/1
650 TABLET ORAL EVERY 4 HOURS PRN
DISCHARGE
Start: 2019-04-17 | End: 2021-01-01

## 2019-04-17 RX ORDER — FLUCONAZOLE 200 MG/1
200 TABLET ORAL DAILY
Status: DISCONTINUED | OUTPATIENT
Start: 2019-04-17 | End: 2019-04-17 | Stop reason: HOSPADM

## 2019-04-17 RX ADMIN — TAZOBACTAM SODIUM AND PIPERACILLIN SODIUM 3.38 G: 375; 3 INJECTION, SOLUTION INTRAVENOUS at 05:41

## 2019-04-17 RX ADMIN — OXYCODONE HYDROCHLORIDE 15 MG: 5 TABLET ORAL at 05:43

## 2019-04-17 RX ADMIN — AMOXICILLIN AND CLAVULANATE POTASSIUM 1 TABLET: 875; 125 TABLET, FILM COATED ORAL at 12:17

## 2019-04-17 RX ADMIN — LOSARTAN POTASSIUM 50 MG: 50 TABLET, FILM COATED ORAL at 08:59

## 2019-04-17 RX ADMIN — FUROSEMIDE 40 MG: 40 TABLET ORAL at 09:00

## 2019-04-17 RX ADMIN — FLUCONAZOLE 200 MG: 200 TABLET ORAL at 12:39

## 2019-04-17 RX ADMIN — IPRATROPIUM BROMIDE AND ALBUTEROL SULFATE 3 ML: .5; 3 SOLUTION RESPIRATORY (INHALATION) at 11:53

## 2019-04-17 RX ADMIN — GABAPENTIN 600 MG: 600 TABLET, FILM COATED ORAL at 08:59

## 2019-04-17 RX ADMIN — METOPROLOL SUCCINATE 50 MG: 50 TABLET, EXTENDED RELEASE ORAL at 08:59

## 2019-04-17 RX ADMIN — MULTIPLE VITAMINS W/ MINERALS TAB 1 TABLET: TAB at 08:59

## 2019-04-17 RX ADMIN — OXYCODONE HYDROCHLORIDE 15 MG: 5 TABLET ORAL at 11:56

## 2019-04-17 RX ADMIN — ATORVASTATIN CALCIUM 10 MG: 10 TABLET, FILM COATED ORAL at 08:59

## 2019-04-17 RX ADMIN — BACLOFEN 10 MG: 10 TABLET ORAL at 08:58

## 2019-04-17 RX ADMIN — IPRATROPIUM BROMIDE AND ALBUTEROL SULFATE 3 ML: .5; 3 SOLUTION RESPIRATORY (INHALATION) at 08:18

## 2019-04-17 ASSESSMENT — ACTIVITIES OF DAILY LIVING (ADL)
ADLS_ACUITY_SCORE: 29

## 2019-04-17 NOTE — PROGRESS NOTES
General Surgery Progress Note  SUBJECTIVE:  Afebrile. Comfortable in bed, no complaints. Denies pain. Takes Oxycodone 15 mg q 6 hours for chronic back/shoulder/hip pain as well as sacral wound discomfort. Tolerating diet, +BM,  + ramos.    OBJECTIVE:  /60 (BP Location: Right arm)   Pulse 86   Temp 97.3  F (36.3  C) (Oral)   Resp 18   Wt 114.4 kg (252 lb 3.2 oz)   LMP 12/11/2011   SpO2 92%   BMI 39.50 kg/m       Wound - some necrotic tissue in wound, sharply debrided at bedside, +granulatin tissue, +mild erythema on surrounding skin. Dakins moistened kerlix gauze placed in the wound.    ASSESSMENT/PLAN:  Left buttock/sacral wound - continue BID dressing changes  Will likely need prolonged wound care and further debridement  SW following for TCU placement, bed available today    Wound care regimen: remove packing, clean with wound spray, pack with gauze or Kerlix moistened with Dakins solution. ABD dressing on top.      Gray Lipscomb PA-C     Pt not seen by me today.  Agree with above.    Iza Narayan MD

## 2019-04-17 NOTE — PLAN OF CARE
Pt remains admitted for UTI  A/Ox4   Pain reported, PO oxy given x1 with relief  No nausea reported  Dressing on left buttocks changed  On IV zosyn   Bobo in place, clear yellow output  O2 88% while sleeping, 1L applied  Up x2 with lift, repo q2hr  Mod cho diet  Discharge pending placement  Will continue to monitor

## 2019-04-17 NOTE — PLAN OF CARE
Physical Therapy Discharge Summary    Reason for therapy discharge:    Discharged to transitional care facility.    Progress towards therapy goal(s). See goals on Care Plan in [facility] electronic health record for goal details.  Goals not met.  Barriers to achieving goals:   discharge from facility.    Therapy recommendation(s):    Continued therapy is recommended.  Rationale/Recommendations:  Pt will benefit from continued skilled therapy to progress independence in mobility prior to returning home.  Continue LE strengthening exercises and functional strengthening.

## 2019-04-17 NOTE — DISCHARGE INSTRUCTIONS
HOME CARE FOLLOWING DEBRIDEMENT    Special instructions for Amanda Richardson:  --Wound care regimen while in hospital: clean wound with wound spray, wipe out any dead tissue with gauze or q-tip, place Dakins moistened gauze into the wound and pack to edges of wound but not on skin, place ABD pad or gauze on top.       WOUND CARE:    Dressing changes should be done one or two times a day as recommended by your wound care nurse.    Dressing change of wet-to-dry dressin. Remove outer layer of dressing material  2. Moisten the packing in the wound, either with saline or in the shower (it is ok if soap gets into the packing/wound area). 3. After the packing is adequately moistened, slowly remove the packing material and rinse the wound with saline or allow water from your shower rinse the wound by allowing it to run down into the site (NOT directly hitting the site).  4. Once the wound bed is cleansed, pat the area dry.  5. Repack the wound with saline-moistened (or Dakins) gauze or recommended packing material.  Try not to allow the moistened gauze to contact your normal skin outside of the wound.  6. Apply over-lay absorbant dry gauze and tape in place.     If you have a complicated wound and have been instructed by a Specialized Wound Care Nurse during your hospitalization, and they have given you contact information to reach them, you may also contact them.

## 2019-04-17 NOTE — PLAN OF CARE
Patient hospitalized for Sepsis. Cleared for discharge to TCU today per MD. Discharge instructions, medications, and follow-ups reviewed with patient and spouse in detail. Discharge medications, including oxycodone prescription were sent with patient. Patient verbalized understanding of discharge instructions. Belongings were returned to patient at time of discharge. Assisted transport providing transport home. IV removed.

## 2019-04-17 NOTE — PLAN OF CARE
Pt slept well between cares, repositioned q2hr. Bobo patent,  good urine output,urine is clear. Oxycodone given for R side pain and shoulder pain. Plan for placement when bed available.

## 2019-04-17 NOTE — PROGRESS NOTES
Discharge Planner   Discharge Plans in progress: Pt has TCU bed for today. Will need stretcher transport. Has Tunneling wound  On her bottom, surgeon does not want pt out of bed except for meals.  Spoke with pt and spouse about location and unable to find other TCU facility at this time.   Barriers to discharge plan: None. SW spoke with pt about current facility does not take MA, only insurance or private pay   Follow up plan:      04/17/19 0900   [facility] [place] [facility] [place] [phone], Fax: [phone]   PT still hopes to be able to return home once wound is healed  H./E stretcher set up for 1430  This facility does NOT need PASS       Entered by: Corinne C. White 04/17/2019 10:05 AM

## 2019-04-17 NOTE — PROGRESS NOTES
[facility]  Infectious Disease Progress Note          Assessment and Plan:   IMPRESSION:   1.  A 53-year-old female well known to me from recent hospitalization, admitted from [facility], onset of fever, chills and leukocytosis at nursing home with etiology unclear.  Numerous possible issues, has abnormal urine currently and history of urine infections (see below), respiratory symptoms including some shortness of breath, nasal congestion and sore throat, large worsening decubitus wound with drainage, bilateral leg erythema, current cultures are pending.   2.  Abnormal urinalysis, now in a nursing home, apparent positive culture, but not available in our system, get the outside culture and see what current urine has shown, had recent major urine infection with obstruction which has been relieved, off antibiotics currently, had vancomycin-resistant enterococcus and Pseudomonas in those cultures.   3.  Worsening decubitus wound, some drainage, possible source of infection.   4.  Respiratory symptoms, question upper respiratory infection, possibly some infiltrates, negative influenza screening and clinical picture not suggesting that.   5.  Multiple sclerosis.   6.  Diabetes mellitus.   7.  Chronic stasis changes in both legs, bilateral erythema.  Doubt this is cellulitis.   8.  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      RECOMMENDATIONS:   1.  Zosyn empiric day 7+, flucon day 6  2.  Improved clinical course and cx same   3.  Decubitus wound ,  Surgery debrided, not likely the cause of the fever unless she is bacteremic.   4. OK disposition 1 week flucon 200 po daily and 5 days po augmnentin 875 bid               Interval History:   no cp, n/v/d, or abd pain.cough some back pain, BC neg, UA abnormal here and [place], UC here candida ? noUC at NH wd debrided  Short of breath, dense cough  No clear pneumonia now off O2               Medications:       amitriptyline  100 mg Oral At Bedtime     amoxicillin-clavulanate  1 tablet Oral BID     atorvastatin  10 mg Oral Daily     baclofen  10 mg Oral TID     enoxaparin  40 mg Subcutaneous Q24H     fluconazole  200 mg Oral Daily     furosemide  40 mg Oral Daily     gabapentin  600 mg Oral TID     insulin aspart  1-3 Units Subcutaneous TID AC     insulin aspart  1-3 Units Subcutaneous At Bedtime     ipratropium - albuterol 0.5 mg/2.5 mg/3 mL  1 vial Nebulization 4x Daily     losartan  50 mg Oral Daily     metoprolol succinate ER  50 mg Oral Daily     multivitamin w/minerals  1 tablet Oral Daily     polyethylene glycol  17 g Oral Daily     senna-docusate  3 tablet Oral BID     sodium hypochlorite   Topical BID                  Physical Exam:   Blood pressure 134/60, pulse 86, temperature 97.3  F (36.3  C), temperature source Oral, resp. rate 20, weight 114.4 kg (252 lb 3.2 oz), last menstrual period 12/11/2011, SpO2 94 %, not currently breastfeeding.  Wt Readings from Last 2 Encounters:   04/14/19 114.4 kg (252 lb 3.2 oz)   04/10/19 114.8 kg (253 lb)     Vital Signs with Ranges  Temp:  [97.3  F (36.3  C)-99.3  F (37.4  C)] 97.3  F (36.3  C)  Pulse:  [86] 86  Heart Rate:  [82-90] 85  Resp:  [16-20] 20  BP: (129-146)/(55-66) 134/60  SpO2:  [88 %-94 %] 94 %    Constitutional:  ill appearing   Lungs: Congestionto auscultation bilaterally, no crackles or wheezing   Cardiovascular: Regular rate and rhythm, normal S1 and S2, and no murmur noted   Abdomen: Normal bowel sounds, soft, non-distended, non-tender   Skin: No rashes, no cyanosis, same  Edema and red legs   Other:           Data:   All microbiology laboratory data reviewed.  Recent Labs   Lab Test 04/16/19  0750 04/15/19  0716 04/14/19  0715   WBC 18.1* 16.2* 15.0*   HGB 9.5* 9.0* 8.7*   HCT 31.5* 29.8* 28.7*   MCV 87 87 88    354 319     Recent Labs   Lab Test 04/16/19  0750 04/15/19  0716 04/14/19  0715   CR 0.58 0.57 0.55     Recent Labs   Lab  Test 11/01/17  1623   SED 39*     Recent Labs   Lab Test 04/10/19  1913 04/10/19  1855 04/10/19  1707 03/16/19  1550 03/16/19  1455 03/16/19  1222 03/16/19  1200 02/04/19  2130 01/31/19  0410   CULT No growth No growth 10,000 to 50,000 colonies/mL  Candida albicans / dubliniensis  Candida albicans and Candida dubliniensis are not routinely speciated  Susceptibility testing not routinely done  *  <1000 colonies/mL  mixed urogenital geovanna  Susceptibility testing not routinely done   No growth Methicillin resistant Staphylococcus aureus (MRSA) isolated  This isolate DOES NOT demonstrate inducible clindamycin resistance in vitro. Clindamycin   is susceptible and could be used when indicated, however, erythromycin is resistant and   should not be used.  *  Olive Donald RN 5th floor notified of MRSA 3/20/19 0800 dg 50,000 to 100,000 colonies/mL  Pseudomonas aeruginosa  *  <10,000 colonies/mL  Enterococcus faecium (VRE)  *  Critical Value/Significant Value, preliminary result only, called to and read back by  Merline Bryan RN at [facility] MS5 at 2:30pm 3/18/2019 ()   No growth Cultured on the 2nd day of incubation:  Staphylococcus epidermidis  *  Critical Value/Significant Value, preliminary result only, called to and read back by  Claudia Marsh RN [id number] 2/6/19. MS    (Note)  POSITIVE for STAPHYLOCOCCUS EPIDERMIDIS and POSITIVE for the mecA  gene (resistant to methicillin) by [employer] multiplex nucleic acid  test. Final identification and antimicrobial susceptibility testing  will be verified by standard methods.      Specimen tested with Verigene multiplex, gram-positive blood culture  nucleic acid test for the following targets: Staph aureus, Staph  epidermidis, Staph lugdunensis, other Staph species, Enterococcus  faecalis, Enterococcus faecium, Streptococcus species, S. agalactiae,  S. anginosus grp., S. pneumoniae, S. pyogenes, Listeria sp., mecA  (methicillin resistance) and Randolph/B (vancomycin  resistance).      Critical Value/Significant Value called to and read back by FREDO ESPINO 02.06.2019 AT 7.43AM,ZG    No growth Moderate growth  Normal geovanna

## 2019-04-17 NOTE — PLAN OF CARE
Occupational Therapy Discharge Summary    Reason for therapy discharge:    Discharged to transitional care facility.    Progress towards therapy goal(s). See goals on Care Plan in [facility] electronic health record for goal details.  Goals not met.  Barriers to achieving goals:   discharge from facility.    Therapy recommendation(s):    Continued therapy is recommended.  Rationale/Recommendations:  Pt not seen by this therapist, discharged to TCU today which had been recommendation from therapy.

## 2019-04-17 NOTE — PROGRESS NOTES
CLINICAL NUTRITION SERVICES - REASSESSMENT NOTE    Recommendations Ordered by Registered Dietitian (RD):   - Continue diet per MD   - Continue Multivitamin with Minerals   - Continue Boost Glucose Control between meals     Future/Additional Recommendations:   - If intake declines, consider ordering pressure injury protocol.      Malnutrition (4/17):   % Weight Loss:  None noted  % Intake:  No decreased intake noted  Subcutaneous Fat Loss:  None observed  Muscle Loss:  None observed - may be masked by adiposity   Fluid Retention:  Does not meet criteria - trace 1+    Malnutrition Diagnosis: Patient does not meet two of the above criteria necessary for diagnosing malnutrition       EVALUATION OF PROGRESS TOWARD GOALS   Diet: Combination Diet (Regular Diet and Moderate Consistent CHO)     Intake/Tolerance: Per the flowsheets, pt has been consuming % of meals ordered. Breakfast typically consists of an omelet, cedillo strips and a whole wheat english muffin, varies from time to time. Lunch varies greatly but typically consists of a protein (hamburger, chicken breast, deli sandwich) with a baked potato and a sugar free chocolate pudding. Dinner also varies but typically consists of a protein and two sides.     Pt reports that she has had a good appetite during her stay. Typically ordering 3 meals per day. She mentioned that she enjoys the Boost glucose control and and that she is drinking both that are sent up each day.     ASSESSED NUTRITION NEEDS:  Dosing Weight:  75 kg - adjusted weight based on an driest actual weight of 114.4 kg on4/14  and an IBW of 61.4 kg.  Estimated Energy Needs: 3736-6287 kcals (30-35 Kcal/Kg)  Justification: increased needs - wound healing, consideration of wheelchair-bound status  Estimated Protein Needs: 113-150+ grams protein (1.5 - 2+ g pro/Kg)  Justification: wound healing, obesity guidelines and preservation of lean body mass  Estimated Fluid Needs: 0696-9794 mL (1 mL/Kcal) or per MD  "  Justification: pending fluid status per provider    NEW FINDINGS:   Weight: Per the weight records, weight appears to be very stable throughout this admission. Pt has not noticed any muscle or weight loss during this admission.   Wt Readings from Last 10 Encounters:   04/14/19 114.4 kg (252 lb 3.2 oz)   04/10/19 114.8 kg (253 lb)   04/09/19 114.8 kg (253 lb)   04/04/19 112.3 kg (247 lb 9.6 oz)   03/31/19 112.5 kg (248 lb 1.6 oz)   03/27/19 112.9 kg (249 lb)   03/27/19 113.4 kg (250 lb)   03/19/19 123.3 kg (271 lb 12.8 oz)   03/15/19 112.9 kg (249 lb)   03/13/19 113.8 kg (250 lb 12.8 oz)     Labs:    Hemoglobin A1C 6.3 (H) - 3/17/19  Blood Glucose Levels 108-145 mg/dL - 4/16/19    Meds:    Lasix 40 mg, daily   Novolog LSSI 1-3 units, at bedtime  Novolog LSSI 1-3 units, 3 x before meals  Thera-vit-M 1 tablet, daily  Miralax 17g, daily  Senokot 3 tablets, 2 x daily   Zofran PRN    - Per WOC note on 4/16: \"Pressure Injury located on bilateral buttocks: both are Unstageable and present on admission.  Right buttock very small and resolving.   - Status: left buttock wound still evolving; Dakin's helping some but pt has  continued need of surgical debridement and Plastic MD follow up,  Symptomatic due to pain and drainage.\"    Previous Goals:   Pt to consume >75% of meal trays TID, and >/=1 oral supplement per day.   Evaluation: Met    Previous Nutrition Diagnosis:   Increased nutrient needs (protein) related to wound healing as evidenced by unstageable pressure injury to L lower buttocks, and assessed needs as above.   Evaluation: No change      MALNUTRITION  % Weight Loss:  None noted  % Intake:  No decreased intake noted  Subcutaneous Fat Loss:  None observed  Muscle Loss:  None observed - may be masked by adiposity   Fluid Retention:  Does not meet criteria - trace 1+    Malnutrition Diagnosis: Patient does not meet two of the above criteria necessary for diagnosing malnutrition    CURRENT NUTRITION " DIAGNOSIS  Increased nutrient needs (protein) related to wound healing as evidenced by unstageable pressure injury to L lower buttocks, and assessed needs as above.     INTERVENTIONS  Recommendations / Nutrition Prescription  - Continue diet per MD   - Continue Multivitamin with Minerals   - Continue Boost Glucose Control between meals    Implementation  Medical Food Supplement: as above. Encouraged consumption of the Boost for added protein to aide in wound healing.   Multivitamin/Mineral: as above   Collaboration and Referral of Nutrition care: discussed pt during interdisciplinary rounds this morning    Goals  Pt to consume >75% of nutritionally adequate meal trays TID, or the equivalent with snacks/supplements.      MONITORING AND EVALUATION:  Progress towards goals will be monitored and evaluated per protocol and Practice Guidelines    Radha [facility]  Dietetic Intern

## 2019-04-18 ENCOUNTER — PATIENT OUTREACH (OUTPATIENT)
Dept: CARE COORDINATION | Facility: CLINIC | Age: 54
End: 2019-04-18

## 2019-04-18 NOTE — PROGRESS NOTES
Transition Communication Hand-off for Care Transitions to Next Level of Care Provider    Name: Amanda Richardson  : 1965  MRN #: [id number]  Primary Care Provider: Julius Hassan  Primary Care MD Name: Dr Hassan  Primary Clinic: [address] [place] [id number]  Primary Care Clinic Name: [facility]  Reason for Hospitalization:  Generalized muscle weakness [M62.81]  Cellulitis of lower extremity, unspecified laterality [L03.119]  Sepsis, due to unspecified organism (H) [A41.9]  Urinary tract infection with hematuria, site unspecified [N39.0, R31.9]  Upper respiratory tract infection, unspecified type [J06.9]  Admit Date/Time: 4/10/2019  6:29 PM  Discharge Date:   Payor Source: Payor: HU[employer] / Plan: HU[employer]NA MEDICARE ADVANTAGE / Product Type: Medicare /     Readmission Assessment Measure (JOAQUIN) Risk Score/category: High    Reason for Communication Hand-off Referral: Fragility    Discharge Plan: [facility]    Discharge Needs Assessment:  Needs      Most Recent Value   Equipment Currently Used at Home  commode, grab bar, toilet, grab bar, tub/shower, shower chair   # of Referrals Placed by [facility]  Post Acute Facilities   Skilled Nursing Facility  [facility] [phone], Fax: [phone]          Follow-up plan:  No future appointments.    Any outstanding tests or procedures:    Procedures     Future Labs/Procedures    Oxygen - Nasal cannula     Comments:    Intermittently needing 1 Lpm by nasal cannula to keep O2 sats 92% or greater while sleeping.  Wean as able.  Encourage incentive spirometer          Referrals     Future Labs/Procedures    Occupational Therapy Adult Consult     Comments:    Evaluate and treat as clinically indicated.    Reason:  Physical deconditioning, multiple sclerosis    Physical Therapy Adult Consult     Comments:    Evaluate and treat as clinically indicated.    Reason: Physical deconditioning, multiple sclerosis                      Key Recommendations:   JOAQUIN ELEVATED.  Pt readmitted from [facility] TCU.  Pt was hospitalized 3/16 to 3/26 with septic shock and also 1/30 to 2/15 with Septic shock.  Pt has a history of MS.  Pt does have a decubitus ulcer.  It was debrided by surgery.  She will need to follow up for her wound.  Her previous baseline was that she was able to pivot.      Pt was hospitalized for an infection that may have been from pneumonia or from urinary tract infection.  This improved with IV antibiotics. Pt will complete the course with Augmentin and fluconazole at the TCU. Wound care will be very important along with follow-up in the wound care clinic.  She had urinary retention and a Bobo catheter is left in place.  This should be removed in 1-2 weeks for a voiding trial or a urology follow-up in clinic during this time.    She was dc'd to [facility] on discharge.    Gabriela Soriano    AVS/Discharge Summary is the source of truth; this is a helpful guide for improved communication of patient story

## 2019-04-18 NOTE — PROGRESS NOTES
Clinic Care Coordination Contact  Care Coordination Transition Communication    Referral Source: IP Handoff    Clinical Data: Patient was hospitalized at [facility] from 4/10/19 to 4/17/19 with diagnosis of fever and leukocytosis consistent with sepsis of unclear etiology.     Transition to Facility:              Facility Name: [facility]              Contact name and phone number/fax: WIL Tong [phone]    Plan: RN/SW Care Coordinator will await notification from facility staff informing RN/SW Care Coordinator of patient's discharge plans/needs. RN/SW Care Coordinator will review chart and outreach to facility staff every 4 weeks and as needed.     WILLIAM Vasquez Clinic Care Coordinator  [phone]  [email or web address]

## 2019-05-03 ENCOUNTER — PATIENT OUTREACH (OUTPATIENT)
Dept: CARE COORDINATION | Facility: CLINIC | Age: 54
End: 2019-05-03

## 2019-05-03 NOTE — PROGRESS NOTES
Clinic Care Coordination Contact  [facility]/Voicemail    Referral Source: IP Handoff  Clinical Data: Care Coordinator outreach to [facility] to determine status of discharge planning for patient.    Outreach attempted x 1 to [facility] Hanna.  Left message on voicemail with call back information and requested return call.    Plan: Care Coordinator will try to reach patient again in two weeks.    WILLIAM Vasquez   Clinic Care Coordinator  [phone]  [email or web address]

## 2019-05-06 ENCOUNTER — PATIENT OUTREACH (OUTPATIENT)
Dept: CARE COORDINATION | Facility: CLINIC | Age: 54
End: 2019-05-06

## 2019-05-06 NOTE — PROGRESS NOTES
Clinic Care Coordination Contact  Care Team Conversations    Return call received from Fabio Ferreira of [facility] Worker, to update care team regarding patient's status. Patient has been discharged from PT and OT. Bobo catheter being taken out today. Patient to receive nursing care until further notice has she has wounds to care for twice a day.     Patient would like to discharge this week but per Hanna, further discharge plans needs to be setup prior to patient returning home.    [facility] requested update be given once patient is ready for discharge; Hanna agreed to plan.    WILLIAM Vasquez  [facility] Care Coordinator  [phone]  [email or web address]

## 2019-05-08 ENCOUNTER — TELEPHONE (OUTPATIENT)
Dept: UROLOGY | Facility: CLINIC | Age: 54
End: 2019-05-08

## 2019-05-08 NOTE — TELEPHONE ENCOUNTER
5/10/19  Spoke with staff at [facility] ([phone]). Bobo was removed there on 5/6. They are tracking her I&O. Amanda would like to keep appt on 6/17 for now because she's concerned that she may not be emptying her bladder completely.    Also called Amanda's TCU - [facility] and left message requesting call back about Bobo status. It looks like catheter came out on 5/6.    Called Amanda and left message requesting call back. (No ID on phone).    ----- Message from Mayito Chauhan MD sent at 5/8/2019 10:13 AM CDT -----  Please read her discharge summary from 4/17  The hospital just set up for appointment for a Bobo catheter removal once she is transferring better  Please call her care facility and just have them take out the catheter, if able, at the facility. She does not need a urology appointment for this. Thanks

## 2019-05-22 ENCOUNTER — TRANSFERRED RECORDS (OUTPATIENT)
Dept: HEALTH INFORMATION MANAGEMENT | Facility: CLINIC | Age: 54
End: 2019-05-22

## 2019-05-29 ENCOUNTER — PATIENT OUTREACH (OUTPATIENT)
Dept: CARE COORDINATION | Facility: CLINIC | Age: 54
End: 2019-05-29

## 2019-05-29 NOTE — PROGRESS NOTES
Clinic Care Coordination Contact  [facility]/Voicemail    Clinical Data: Care Coordinator outreach to [facility] to determine status of discharge planning for patient.     Outreach attempted x 1 to [place] [facility] Hanna.  Left message on voicemail with call back information and requested return call.     Plan: Care Coordinator will try to reach patient again in one week.    WILLIAM Vasquez   Clinic Care Coordinator  [phone]  [email or web address]

## 2019-06-06 NOTE — PROGRESS NOTES
Clinic Care Coordination Contact  [facility]/Voicemail    Clinical Data: Care Coordinator outreach to [facility] to determine status of discharge planning for patient.    Outreach attempted x 2 to [facility] Hanna.  Left message on voicemail requesting an update regarding patient's discharge status.     Plan: [facility] has not received status updates regarding patient's discharge plans from [facility] since 5/6/19. [facility]  has this writer's contact information if any assistance is needed. Care Coordinator will do no further outreaches at this time.    WILLIAM Vasquez   Clinic Care Coordinator  [phone]  [email or web address]

## 2019-06-10 ENCOUNTER — TELEPHONE (OUTPATIENT)
Dept: FAMILY MEDICINE | Facility: CLINIC | Age: 54
End: 2019-06-10

## 2019-06-10 DIAGNOSIS — G35 MS (MULTIPLE SCLEROSIS) (H): ICD-10-CM

## 2019-06-10 DIAGNOSIS — I10 HYPERTENSION GOAL BP (BLOOD PRESSURE) < 140/90: ICD-10-CM

## 2019-06-10 DIAGNOSIS — L89.159 PRESSURE INJURY OF SKIN OF SACRAL REGION, UNSPECIFIED INJURY STAGE: ICD-10-CM

## 2019-06-10 DIAGNOSIS — G89.4 CHRONIC PAIN SYNDROME: Chronic | ICD-10-CM

## 2019-06-10 RX ORDER — HYDROCHLOROTHIAZIDE 12.5 MG/1
25 CAPSULE ORAL DAILY
COMMUNITY
Start: 2019-06-10 | End: 2019-07-26

## 2019-06-10 RX ORDER — DOCUSATE SODIUM 100 MG/1
200 CAPSULE, LIQUID FILLED ORAL DAILY
COMMUNITY
Start: 2019-06-10 | End: 2019-06-21

## 2019-06-10 RX ORDER — LOSARTAN POTASSIUM 50 MG/1
100 TABLET ORAL DAILY
Qty: 30 TABLET | Refills: 0 | COMMUNITY
Start: 2019-06-10 | End: 2019-06-27

## 2019-06-10 RX ORDER — AMOXICILLIN 250 MG
2 CAPSULE ORAL DAILY
Qty: 180 TABLET | Refills: 0 | COMMUNITY
Start: 2019-06-10 | End: 2019-07-26

## 2019-06-10 NOTE — TELEPHONE ENCOUNTER
Reason for call:  Form   Our goal is to have forms completed within 72 hours, however some forms may require a visit or additional information.     Who is the form from? Pharmacy - [facility] - medication reconciliation  Where did the form come from? form was faxed in    Where was the form placed? Given to Karuna SUTHERLAND RN  What number is listed as a contact on the form? None given    Please fax with MD signature to number listed on form when complete

## 2019-06-10 NOTE — TELEPHONE ENCOUNTER
Message handled by Nurse Triage with [facility] - provider name: MD BOSTON - please med with patient or patient's spouse.    Ivanna Kelly RN  [place] Triage

## 2019-06-10 NOTE — TELEPHONE ENCOUNTER
MED RECONCILIATION DONE    Discrepancies:   -baclofen    -Epic: intraTHECAL Internal Pump (by Intrathecal route continuous prn 4/11- Managed by [facility], spoke to Jolene at [phone] Pump contains baclofen 2000mcg/ml Alarm date: 6/18/19 Dose-434.8 mcg/day Reservoir contains 2ml after the alarm date. - Intrathecal   -Form: 10mg tab - 1 tab PO TID PRN  -Losartan/HCTZ    -Epic:     -hydrochlorothiazide 12.5mg cap (1 cap PO daily)    -losartan 50mg tab (1 tab PO daily   -Form: losartan-hydrochlorothiazide 100-25mg tab (1 tab PO daily)  -senna-docusate (SENOKOT-S/PERICOLACE) 8.6-50 MG tablet   -Epic: 3 tabs PO BID   -Form: 3 tab PO daily    Medications on Epic but not Form:   -amoxicillin-clavulanate (AUGMENTIN) 875-125 MG tablet  -bisacodyl (DULCOLAX) 10 MG suppository  -cholecalciferol 1000 units TABS  -fluconazole (DIFLUCAN) 200 MG tablet  -furosemide (LASIX) 40 MG tablet  -gabapentin (NEURONTIN) 300 MG capsule  -guaiFENesin (MUCINEX) 600 MG 12 hr tablet  -ipratropium - albuterol 0.5 mg/2.5 mg/3 mL (DUONEB) 0.5-2.5 (3) MG/3ML neb solution  -omega-3 fatty acids (FISH OIL) 1200 MG capsule  -polyethylene glycol (MIRALAX/GLYCOLAX) packet  -polyethylene glycol (MIRALAX/GLYCOLAX) packet  -POTASSIUM CHLORIDE ER PO  -sodium hypochlorite (DAKINS) 0.4-0.5 % external solution    Medications on Form but not Epic:   -Krill Oil (Omega 3) 350MG cap - 1 cap PO daily    Form currently on [facility] [place] RN Folder      Routing to PCP for further review/recommendations/orders.      Ivanna Kelly RN  [facility]

## 2019-06-10 NOTE — TELEPHONE ENCOUNTER
Discrepancies:   -baclofen (PATIENT DOING BOTH - TABS ONLY PRN)              -Epic: intraTHECAL Internal Pump (by Intrathecal route continuous prn 4/11- Managed by [facility], spoke to Jolene at [phone] Pump contains baclofen 2000mcg/ml Alarm date: 6/18/19 Dose-434.8 mcg/day Reservoir contains 2ml after the alarm date. - Intrathecal              -Form: 10mg tab - 1 tab PO TID PRN  -Losartan/HCTZ                        -Epic:                           -hydrochlorothiazide 12.5mg cap (1 cap PO daily) - patient taking 25mg daily                          -losartan 50mg tab (1 tab PO daily) - patient taking 100mg daily              -  -senna-docusate (SENOKOT-S/PERICOLACE) 8.6-50 MG tablet - patient taking 2 tabs PO daily              -              -     Medications on Epic but not Form:         -gabapentin (NEURONTIN) 300 MG capsule - patient taking 600mg PO TID      -omega-3 fatty acids (FISH OIL) 1200 Unit capsule - taking      -sodium hypochlorite (DAKINS) 0.4-0.5 % external solution - taking by apply topically 3 times per week to left buttocks wound     Medications on Form but not Epic:     Med List updated     Routing to PCP for further review/recommendations/orders.  Med list attached to form as well      Ivanna Kelly RN  [facility]

## 2019-06-11 NOTE — TELEPHONE ENCOUNTER
Completed forms faxed back to [facility] at [phone].   Originals sent to be scanned.     Katharina Goodwin

## 2019-06-13 ENCOUNTER — TELEPHONE (OUTPATIENT)
Dept: FAMILY MEDICINE | Facility: CLINIC | Age: 54
End: 2019-06-13
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
## 2019-06-13 NOTE — TELEPHONE ENCOUNTER
Ok for follow up in clinic ASAP 40 minute appt, advise home care, full safety eval with pt/ot/st/sw/wound care lymphedema/hha/pca?

## 2019-06-14 NOTE — TELEPHONE ENCOUNTER
Called Diana @ # below - left detailed VM with MD BOSTON message below    Attempt #1  Called patient @ [phone] - Chhu a non-detailed message to call back and speak with any triage nurse.    Ivanna Kelly RN  [place] Triage

## 2019-06-17 NOTE — TELEPHONE ENCOUNTER
Patient calling back. Attempted to schedule her for today, however, patient declined and stated that she cannot come in until later this week - Thursday or Friday afternoons. Patient is currently scheduled for Friday 6/21. FYI routed to provider.  Josefa Juarez, LILLIANAN, RN  [facility]

## 2019-06-18 ENCOUNTER — TELEPHONE (OUTPATIENT)
Dept: FAMILY MEDICINE | Facility: CLINIC | Age: 54
End: 2019-06-18

## 2019-06-18 NOTE — TELEPHONE ENCOUNTER
She has visit with you on 6/21/2019.  I don't think there is a form you just need to document the face-to-face in the encounter.  I believe.    LILLIANA Deng, RN, N  [facility]) [phone]

## 2019-06-18 NOTE — TELEPHONE ENCOUNTER
Detailed VM left for Marlon, PT with [facility] regarding TS recommendation for full safety eval with PT/OT/ST/SW/wound care/lymphedmea/HHA/PCA.    Chandrika Nathan, LILLIANA, RN, N  [facility]) [phone]

## 2019-06-18 NOTE — TELEPHONE ENCOUNTER
[facility] - OT aspen     Wanted to update with clinic about pt's needs for a power chair with tilt since she is unable to shift weight on her own, she may also  qualify for hospital bed with an alternating mattress     Would like MD BOSTON to do a face to face on Friday for these for medicare if pt decided she wants them         Gave verbal okay for social work     Cordelia Stevens RN, BSN  [place] T[facility]

## 2019-06-19 NOTE — PROGRESS NOTES
SUBJECTIVE:                                                    Amanda Richardson is a 54 year old female who presents to clinic today for the following health issues:        Documentation of Face-to-Face and Certification for Home Health Services     Patient: Amanda Richardson   YOB: 1965  MR Number: [id number]  Today's Date: 6/19/2019    I certify that patient: Amanda Richardson is under my care and that I, or a nurse practitioner or physician's assistant working with me, had a face-to-face encounter that meets the physician face-to-face encounter requirements with this patient on: 6/21/2019.    This encounter with the patient was in whole, or in part, for the following medical condition, which is the primary reason for home health care: Multiple Sclerosis, recurrent sepsis and weakness associated,     I certify that, based on my findings, the following services are medically necessary home health services: DME equipment per OT and PT, ie motorized scooter.    My clinical findings support the need for the above services because: see above.    Further, I certify that my clinical findings support that this patient is homebound (i.e. absences from home require considerable and taxing effort and are for medical reasons or [employer] services or infrequently or of short duration when for other reasons) because: Leaving home is medically contraindicated for the following reason(s): Infection risk / immunocompromised state where it is safer for them to receive services in the home. and Pressure on open wounds during transport impairs healing process...fall and safety risks    Based on the above findings. I certify that this patient is confined to the home and needs intermittent skilled nursing care, physical therapy and/or speech therapy.  The patient is under my care, and I have initiated the establishment of the plan of care.  This patient will be followed by a physician who will periodically review  the plan of care.  Physician/Provider to provide follow up care: Julius Hassan    Responsible Medicare certified PECOS Physician: Dr. Julius Hassan  Physician Signature: See electronic signature associated with these discharge orders.  Date: 6/19/2019 6/21/2019 Follow Up Note    Patient was seen off and on in the hospital since the end of January due to recurrent septic shock. Patient was admitted to hospital from 1/30/2019 to 2/15/2019, from 3/16/2019 to 3/26/2019, and from 4/10/2019-4/17/2017 all for treatment of sepsis. See notes below. Patient was seen in [facility] between the 1st and 2nd hospitalization and between the 2nd and 3rd hospitalization. Patient was seen at [place] following 3rd hospitalization until the 31st of May. Patient reports she is doing well today, her pressure sore is healing, and she is back at home. Patient reports she has been experiencing some incontinence with bladder normal to baseline, wears depends. Patient states bowels, appetite, and sleep are normal. Patient has an appointment scheduled with Urology on 7/3/2019 for further urosepsis management.     Multiple Sclerosis: Stable.     Diabetes: Patient's Diabetes is well controlled. Patient is currently prescribed 500 mg Metformin daily for DM management.    Glucose   Date Value Ref Range Status   06/21/2019 94 70 - 99 mg/dL Final   04/16/2019 115 (H) 70 - 99 mg/dL Final   04/15/2019 115 (H) 70 - 99 mg/dL Final   04/14/2019 109 (H) 70 - 99 mg/dL Final   04/13/2019 106 (H) 70 - 99 mg/dL Final     Lab Results   Component Value Date    A1C 6.3 03/17/2019    A1C 6.6 02/09/2019    A1C 6.4 01/30/2019    A1C 6.8 06/20/2018    A1C 6.5 10/02/2017     Hyperlipidemia: Patient's hyperlipidemia is uncontrolled. Patient is currently prescribed 10 mg Atorvastatin daily for hyperlipidemia management.    Recent Labs   Lab Test 03/18/19  0625 02/02/19  0455 06/20/18  1529 05/02/16  1613 12/08/14  1756 11/13/12  0957   CHOL  --   --  141 169 172 205*    HDL  --   --  33* 35* 36* 32*   LDL  --   --  82 107* 97 140*   TRIG 218* 292* 130 135 197* 167*   CHOLHDLRATIO  --   --   --   --  4.8 6.5*     Hypertension: Patient presents today as normotensive. Patient is currently prescribed 25 mg hydrochlorothiazide daily, 100 mg Losartan daily, and 50 mg Metoprolol Succinate daily for hypertension management.    BP Readings from Last 5 Encounters:   06/21/19 130/66   04/17/19 114/70   04/10/19 136/72   04/09/19 147/76   04/04/19 150/78     Creatinine   Date Value Ref Range Status   06/21/2019 0.50 (L) 0.52 - 1.04 mg/dL Final     Non-Hodgkin's T-cell Lymphoma: Stable. No issues.     Depression/Anxiety: Patient had a PHQ9 score of 4 and a GAD7 score of 0 today. Patient's depression/anxiety is well controlled. Patient is not currently prescribed any medication for depression/anxiety management.    4/10/2019-4/17/2019 Hospital Note    Discharge Diagnoses:  Sepsis  Possible pneumonia  Possible UTI  Urinary retention  Chronic venous stasis, lymphedema  Sacral, decubitus ulcer  Multiple sclerosis  Chronic pain syndrome  Type II DM  HTN  Probable obesity hypoventilation syndrome  Chronic constipation  History of femur fracture facial  Chronic anemia      Hospital Course:  Summary of Stay: Amanda Richardson is a 53 year old female with past medical history significant for MS, chronic pain syndrome with pain pump in place, chronic decubitus and inguinal ulcers/wounds, type II DM, urinary retention, obesity, likely obesity hypoventilation syndrome, constipation, HTN, anemia, right femur fracture, and chronic leukocytosis with recent admission for urosepsis due to a ureteral stone who is now admitted on 4/10/2019 with fever and leukocytosis consistent with sepsis of unclear etiology.  Started on Zosyn empirically and ID was consulted.  Blood cultures no growth today.  Urine culture growing yeast so IV fluconazole was added.  General surgery was consulted to evaluate her decubitus  ulcers which show some necrotic tissue, but did not seem to be the source of infection.  Has undergone local debridement of these ulcers while here.  Having urinary retention and a Ramos catheter was placed for this and for ongoing wound cares.  Resumed her Lasix for lower extremity edema.  PT and OT consulted, TCU when placement found.     Problem List/Assessment and Plan:   Sepsis, possible pneumonia, possible UTI: Multiple potential sources of infection. WBC to 20K associated with malaise, URI symptoms, increased LE edema and redness. Lactate is normal and BP is stable. CXR with possible infiltrate; her symptoms are more consistent with URI than pneumonia. She also has a chronic decubitus ulcer which could be a source although does not look infected per surgery.  LE edema and redness more likely venous stasis than cellulitis, although all potential sources.  Urine culture growing yeast.  She does have a chronic leukocytosis so unclear that this will ever normalize.  Intermittent low grade fevers.  Cough now productive of more sputum that she says is green.  -ID consulted  -Blood cultures NGTD  -Urine culture growing yeast  -Continue IV Piperacillin/Tazobactam and IV fluconazole, on discharge change to Augmentin 875 mg twice daily and p.o. fluconazole 200 mg daily to finish on 4/22     Urinary retention: Patient reports having ramos intermittently in the past. Having high PVR and requiring straight cath. Given her significant skin issues and urinary retention, will place ramos for now.    -Keep Ramos in place for 1-2 weeks until more mobile and voiding removal with voiding trial or follow-up with urology during this time, defer to nursing home physician     Chronic venous stasis, lymphedema: At risk for cellulitis slight pink color to her legs, but they do not appear infected at present.  Does have bilateral 1+ lower extremity edema.  Reports negative DVT US at TCU within the past few days  -Resumed Lasix 40 mg  p.o. Daily      Left sacral decubitus ulcer, present on admission  -WO in general surgery consulted consulted  -Pressure offloading intermittently as much as possible  -asked surgery to see, patient had some small areas of necrotic tissue that has been debrided multiple times here, although felt less likely to be the source of infection   -Follow-up in wound clinic  -Wound care regimen while in hospital: clean wound with wound spray, wipe out any dead tissue with gauze or q-tip, place Dakins moistened gauze into the wound and pack to edges of wound but not on skin, place ABD pad or gauze on top.         Multiple sclerosis: baclofen pump in situ, reports refilled within the past 2 weeks prior to admission.  -Continue PTA PO baclofen and baclofen pump     Chronic pain syndrome: continue PTA neurontin, PRN oxycodone, amitriptyline, acetaminophen.  -Patient reports that she has been on oxycodone 15 mg q6h prn for many years although recently it was decreased in half very recently at [facility]. She is quiet concerned about this and reports uncontrolled pain, wants to have her regular dose.  Increased back to baseline dose and so far no worsening somnolence and pain level is adequate.  Discharged on 15 mg dosing.  Bowel regimen.     Diabetes mellitus type 2:  controlled on PO meds.  Resume metformin.       HTN: Blood pressure 100-120 systolic while here.  -Resumed metoprolol XL 50 mg daily  -Resumed Lasix 40 mg p.o. Daily   -Resumed losartan 50 mg daily  -Held HCTZ for now for lower pressure, can resume if blood pressure going up at TCU     Probable obesity hypoventilation: Monitor oxygenation and signs of oversedation.  Intermittent mild hypoxia when sleeping needing 1 L O2 occasionally, wean as able     Chronic constipation: miralax,senna as per PTA regimen     History of right femur fracture: Incidentally found to have a previous right femur fracture on admission 2 months ago.  Does have some chronic right hip  pain.     HLD: Continue atorvastatin.     Chronic anemia: Hemoglobin at baseline of 8-10.     Physical deconditioning: PT and OT at TCU.  If not improving in time may need long-term care.     3/16/2019-3/26/2019 Hospital Note    Discharge Diagnoses     CHIEF COMPLAINTS:      1.  Septic shock secondary to infected left ureteral stone.  Urine culture growing pseudomonas this admission.  Urine culture also growing out VRE.  The patient was treated with ciprofloxacin and daptomycin this admission.  Plan is for a followup with Dr. Chauhan of Urology for removal of ureteral stent on 03/29/2019.   2.  Acute respiratory failure secondary to severe sepsis requiring intubation and mechanical ventilation, resolved.   3.  Septic encephalopathy this admission.   4.  Acute renal failure secondary to sepsis.      PAST MEDICAL HISTORY:   1.  Recent 2-week hospitalization for septic shock and respiratory failure with discharge on 02/15/2019.  The patient subsequently underwent outpatient urologic procedure on 03/15/2019 with lithotripsy and left ureteral stent exchange for left ureteral stone.  Shortly after that procedure, the patient became lethargic and hypoxic and was brought to the ER for evaluation.  She was subsequently evaluated, which led to her current admission.   2.  Hypertension history.   3.  History of multiple sclerosis.  She has a chronic baclofen pump.   4.  Chronic pain syndrome.   5.  Type 2 diabetes mellitus, on metformin.   6.  Hyperlipidemia.      PRINCIPAL PROCEDURES THIS ADMISSION:   1.  Infection Disease consultation.   2.  Urology consultation.   3.  IV antibiotics.   4.  Urine culture growing out ,000 colonies of pseudomonas as well as less than 10,000 colonies of Enterococcus.  Enterococcus was sensitive to linezolid, but resistant to vancomycin and penicillin.  Pseudomonas was pansensitive.      History of Present Illness     Ms. Richardson is a 53-year-old female with the above medical history who  was recently hospitalized after a 2-week hospital stay for septic shock and respiratory failure.  She was discharged 02/15/2019.  It was felt that the septic shock was due to a urinary tract infection.  During that admission to the hospital, Urology was consulted and underwent ureteral stent placement.  Blood and urine cultures were positive for E. coli that admission.      The patient went to see Dr. Chauhan of Urology on 03/15/2019 for lithotripsy and left ureteral stent exchange.  After the procedure, the patient was somewhat lethargic and the following day, she was found to be hypoxic at her transitional care unit.  She was subsequently brought back to the ER for evaluation and felt to have septic shock.  She was admitted to the Intensive Care Unit and intubated for respiratory failure and sepsis.       Hospital Course     Septic shock:  Felt secondary to infected ureteral stone.  Symptoms started after lithotripsy and ureteral stent exchange.  Urine culture grew out Pseudomonas as well as enterococcus.  The patient was treated with antibiotics while hospitalized with subsequent improvement.  She was successfully extubated and transitioned from the ICU to the medical floor.  By day of discharge, sepsis had resolved, she was afebrile, and weaned off of oxygen.  Plans are for discharge to a transitional care unit.  She will see Dr. Chauhan on 03/29/2019 for ureteral stent removal.  Infectious Disease is recommending 5 more days of oral antibiotics to complete her therapy for sepsis.  She will be discharged on oral ciprofloxacin to cover for Pseudomonas.  She has also history of daptomycin while hospitalized for a small amount of enterococcus found in urine, but I doubt that this is the pathogen that led to her sepsis and Infectious Disease felt comfortable stopping her daptomycin at time of discharge from the hospital.    1/30/2019-2/15/2019 Hospital Note    Discharge Diagnosis:   Septic shock likely due to  urinary tract infection  Hypercapnic and hypoxic respiratory failure  Acute renal failure secondary to obstructive uropathy with left-sided hydronephrosis  New atrial flutter provoked by urosepsis  Borderline type 2 diabetes mellitus  Multiple sclerosis  Chronic pain syndrome on chronic narcotics  Hypertension  Bilateral inguinal wounds, chronic  Right femoral neck fracture  Constipation      Hospital Course:   Amanda Richardson is a 53 year old female with past medical history significant for advanced MS, diabetes mellitus, hypertension, history of T-cell lymphoma status post treatment, chronically elevated WBC count, history of recurrent UTIs who presented with altered mental status increased weakness and somnolence.       Evaluation in the emergency room showed significant leukocytosis, hypotension, acute renal failure, obstructing left kidney stone with moderate hydronephrosis and respiratory failure with acidosis with hypercapnia.  She was admitted on 1/30/2019 with septic shock.  Patient received IV antibiotics and fluid resuscitation in the emergency room and underwent emergent cystoscopy and left retrograde pyelogram with stent placement on 1/30/2019.  CT also showed closed fracture of right femur.  She was initially placed on BiPAP for respiratory failure but perioperatively was intubated.  She was admitted to ICU for further evaluation.     1/31/2019 patient developed wide-complex tachycardia appeared to be SVT failed to respond to vasovagal maneuvers.  Adenosine was given which showed rhythm to be atrial flutter she received cardioversion and was started on amiodarone.  Vasopressor drip was initiated briefly for hypotension.  She is now off pressors as well as sedative meds.      Pt extubated successfully on 2/8.  Now able to eat regular diet.  She was transferred to the medical floor on 2/10/19.  She remained hypoxic and received diuresis with dramatic improvement of her weight.  She remains on 3 L of  oxygen by nasal cannula which I suspect is due to atelectasis and severe deconditioning.  She has completed a course of antibiotics.  She will need supplemental oxygen on discharge.  She will discharged to TCU today.  She will require follow-up in the urology clinic for definitive management of her stone and stent.     Septic shock likely due to urinary tract source (altered mental status, acute renal failure, hypotension, leukocytosis, acidosis, respiratory failure).  She received aggressive fluid resuscitation along with initiation of pressors prior to being taken to the operating room.  She now is stable and shock is fully resolved.  --CT scan on admission showed obstructing stone with hydronephrosis and acute renal failure.  --Status post cystoscopy and stent placement 1/30/2019.  Per report patient did have purulent drainage noted during cystoscopy.  --IV antibiotics: Started zosyn to cover for E. Coli isolate, then on ceftriaxone.  Started on 1/30.  We planned on 14 days total.  End date was 2/13.  --Blood and urine cultures positive for E coli.  --Extubated as of 2/8/19.      Hypercapnic respiratory failure present on admission.  Still with some hypoxia likely related to volume overload from resolved sepsis and associated necessary management with severe deconditioning and atelectasis.  --Likely multifactorial from sepsis and chronic narcotic use.  --Was intubated perioperatively and remained intubated following the procedure until 2/8.  --Stop IV diuresis.     Acute renal failure with left hydronephrosis: Resolved and now at baseline  --Secondary to obstructive uropathy.  Creatinine on admission 2.25, now with creatinine at baseline.  --Status post a stent placement; anticipate outpatient follow-up for definitive treatment of the stone and removal of the stent.     New Atrial flutter provoked by urosepsis.  Stable without recurrence.  --Status post DC cardioversion 1/31/2019  --Appreciate cardiology  "consult  --Cardiac echo when compared to prior study showed minimal deterioration of left ventricular systolic function.  EF 50-55%.  --No anticoagulation for now per cardiology patient was in atrial flutter for less than 8 hours.  --Switched to oral amiodarone for a total of 2 weeks (now on 200 mg daily).  Will stop at the time of discharge.     Right femoral neck fracture  --Incidental finding on CT.  Likely present for quite some time.  --Patient has MS and at baseline is able to pivot herself.  --At this time, pt notes that the right hip is painful with vigorous movement.  --Orthopedic surgery was consulted.  No immediate intervention recommended at this time.     Type 2 diabetes mellitus, \"borderline\"  --At baseline on metformin will hold for now  --Sliding scale insulin     MS with associated chronic pain  --Prior to admission on amitriptyline, baclofen, duloxetine, gabapentin and oxycodone.  --PT evaluation and TCU planned     Significant leukocytosis: Improving  --Patient has history of T-cell non-Hodgkin's lymphoma status post treatment with baseline elevated white cell count (mid-teens).      H/o Hypertension  --Resumed losartan and hydrochlorothiazide (not quite at pre-hospitalization doses).  --BP OK.     Bilateral inguinal wounds  --Managed with topical wound cares.  --Should likely have home help with management of these upon discharge.     Constipation: In the setting of scheduled narcotic use.  Continue laxatives, give enema.     Reviewed and updated as needed this visit by Provider       BP Readings from Last 3 Encounters:   06/21/19 130/66   04/17/19 114/70   04/10/19 136/72       body mass index is 39.47 kg/m .    Wt Readings from Last 4 Encounters:   06/21/19 114.3 kg (252 lb)   04/14/19 114.4 kg (252 lb 3.2 oz)   04/10/19 114.8 kg (253 lb)   04/09/19 114.8 kg (253 lb)       Health Maintenance    Health Maintenance Due   Topic Date Due     DIABETIC FOOT EXAM  1965     EYE EXAM  1965 "     FIT  05/08/1973     ZOSTER IMMUNIZATION (1 of 2) 05/08/2013     DEXA  06/26/2017     LIPID  06/20/2019     MICROALBUMIN  06/20/2019     URINE DRUG SCREEN  06/20/2019     PAP  05/02/2019     MAMMO SCREENING  07/09/2019       Current Problem List    Patient Active Problem List   Diagnosis     MS (multiple sclerosis) (H)     Non Hodgkin's lymphoma (H)     Leukocytosis     Gallstone     Hypertension goal BP (blood pressure) < 140/90     Spinal stenosis, lumbar     Abnormality of gait     Pain medication agreement- signed Nov 20,2012     Lumbar radiculopathy     Colon polyps     Neuralgia, neuritis, and radiculitis, unspecified     Encounter for long-term current use of medication     Health Care Home     Moderate depressive episode (H)     Leg muscle spasm     Chronic pain syndrome     Controlled substance agreement signed     T-cell lymphoma (H)     Tobacco abuse     Type 2 diabetes, HbA1c goal < 7% (H)     Hyperlipidemia LDL goal <70     CKD (chronic kidney disease) stage 1, GFR 90 ml/min or greater     Type 2 diabetes mellitus with stage 1 chronic kidney disease, without long-term current use of insulin (H)     Morbid obesity (H)     Acute hypercapnic respiratory failure (H)     Sepsis (H)     S/P right hip fracture       Past Medical History    Past Medical History:   Diagnosis Date     Abnormality of gait 7/27/2012     Arrhythmia     with sepsis     Chronic pain     [facility] - yearly, next in summer 2018     CKD (chronic kidney disease) stage 1, GFR 90 ml/min or greater     kidney stones     Colon polyps 1/15    tubular adenomas x 2     Gallstone 6/11/2012     Hyperlipidemia LDL goal <70      Hypertension goal BP (blood pressure) < 140/90      Leukocytosis 6/11/2012     Moderate depressive episode (H)      MS (multiple sclerosis) (H) 2003    Dr Vigil/Gaby - [facility]     Multiple sclerosis (H)      Non Hodgkin's lymphoma (H) 06/11/2012    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 Cystoscopy 2. LEFT retrograde pyelogram 3. LEFT JJ stent placement 4. <1hr physician fluoroscopy time;  Surgeon: Epifanio Sapp MD;  Location: [facility] OR     COMBINED CYSTOSCOPY, RETROGRADES, URETEROSCOPY, LASER HOLMIUM LITHOTRIPSY URETER(S), INSERT STENT Left 3/15/2019    Procedure: Cystoscopy, left ureteral stent exchange, left retrograde pyelogram, interpretation of fluoroscopic images, left ureteroscopy with holmium lithotripsy and stone basketing, 22 modifier for difficult lengthy case.;  Surgeon: Mayito Chauhan MD;  Location: [facility] OR     CYSTOSCOPY, REMOVE STENT(S), COMBINED Left 3/27/2019    Procedure: Flexible cystoscopy with left ureteral stent removal;  Surgeon: Mayito Chauhan MD;  Location: [facility] OR     FUSION LUMBAR ANTERIOR, FUSION LUMBAR POSTERIOR TWO LEVELS, COMBINED  10/17/2013    lumbar fusion - Dr Floyd     INSERT PUMP BACLOFEN  04/2017    intrathecal baclofen pump implantation     IRRIGATION AND DEBRIDEMENT LOWER EXTREMITY,  COMBINED Right 2015    Right ankle I&D d/t infection     OPEN REDUCTION INTERNAL FIXATION ANKLE  5/15    Right Bimalleolar ankle fx ORIF     OPEN REDUCTION INTERNAL FIXATION ANKLE Right 11/2015    Revision due to spasms pulling screws out of ankle     SINUS SURGERY  2011    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     XR LUMBAR EPIDURAL INJECTION INCL IMAGING  3/14    Left L4-5 Epidural Dr Winter       Current Medications    Current Outpatient Medications   Medication Sig Dispense Refill     acetaminophen (TYLENOL) 325 MG tablet Take 2 tablets (650 mg) by mouth every 4 hours as needed for mild pain       amitriptyline (ELAVIL) 100 MG tablet Take 1 tablet (100 mg) by mouth At Bedtime 90 tablet 3     atorvastatin (LIPITOR) 10 MG tablet Take 1 tablet (10 mg) by mouth daily 90 tablet 3     baclofen (LIORESAL) 10 MG tablet Take 10 mg by mouth 3 times daily       baclofen (LIORESAL) intraTHECAL Internal Pump by Intrathecal route continuous prn 4/11- Managed by [facility], spoke to Jolene at [phone]  Pump contains baclofen 2000mcg/ml  Alarm date: 6/18/19  Dose-434.8 mcg/day  Reservoir contains 2ml after the alarm date.       gabapentin (NEURONTIN) 300 MG capsule Take 600 mg by mouth 3 times daily       hydrochlorothiazide (MICROZIDE) 12.5 MG capsule Take 2 capsules (25 mg) by mouth daily       metFORMIN (GLUCOPHAGE) 500 MG tablet Take 1 tablet (500 mg) by mouth daily (with dinner) 90 tablet 3     metoprolol succinate (TOPROL-XL) 50 MG 24 hr tablet TAKE 1 TABLET BY MOUTH ONCE DAILY 90 tablet 1     Multiple Vitamin (MULTI-VITAMIN PO) Take 1 tablet by mouth daily        omega-3 fatty acids (FISH OIL) 1200 MG capsule Take 1 capsule by mouth daily.       oxyCODONE (ROXICODONE) 5 MG tablet Take 3 tablets (15 mg) by mouth every 6 hours as needed for severe pain 30 tablet 0     senna-docusate (SENOKOT-S/PERICOLACE) 8.6-50 MG tablet Take 2 tablets by mouth daily 180 tablet 0     gabapentin  (NEURONTIN) 600 MG tablet TAKE 1 TABLET BY MOUTH THREE TIMES DAILY AND  MAY  TAKE  ONE  ADDITIONAL  AS  NEEDED 270 tablet 3     losartan (COZAAR) 50 MG tablet Take 2 tablets (100 mg) by mouth daily 90 tablet 1       Allergies    Allergies   Allergen Reactions     Lisinopril      Lip swelling     Cyclobenzaprine Other (See Comments)     Per 4-10-17 H&P by Yanna Dugan PA-C.     Flexeril [Cyclobenzaprine Hcl]      Got confused        Immunizations    Immunization History   Administered Date(s) Administered     Influenza (H1N1) 12/10/2009     Influenza (IIV3) PF 12/10/2009, 2012, 2014     Influenza Vaccine IM 3yrs+ 4 Valent IIV4 10/21/2013, 2018     Influenza Vaccine, 3 YRS +, IM (QUADRIVALENT W/PRESERVATIVES) 2017     Pneumococcal 23 valent 2011     TDAP Vaccine (Adacel) 2018     Tdap (Adacel,Boostrix) 2008       Family History    Family History   Problem Relation Age of Onset     C.A.D. Father         with CHF      Cancer Father         ? unsure type - abdominal      Hypertension Mother      Thyroid Disease Mother         goiter      Breast Cancer Sister 58     Thyroid Disease Son      Cancer - colorectal No family hx of        Social History    Social History     Socioeconomic History     Marital status:      Spouse name: Fernando     Number of children: 3     Years of education: 14     Highest education level: Not on file   Occupational History     Employer: UNEMPLOYED   Social Needs     Financial resource strain: Not on file     Food insecurity:     Worry: Not on file     Inability: Not on file     Transportation needs:     Medical: Not on file     Non-medical: Not on file   Tobacco Use     Smoking status: Former Smoker     Packs/day: 0.50     Types: Cigarettes     Last attempt to quit: 2013     Years since quittin.9     Smokeless tobacco: Never Used     Tobacco comment: since age 17   Substance and Sexual Activity     Alcohol use: No     Drug use: No      "Sexual activity: Yes     Partners: Male   Lifestyle     Physical activity:     Days per week: Not on file     Minutes per session: Not on file     Stress: Not on file   Relationships     Social connections:     Talks on phone: Not on file     Gets together: Not on file     Attends Holiness service: Not on file     Active member of club or organization: Not on file     Attends meetings of clubs or organizations: Not on file     Relationship status: Not on file     Intimate partner violence:     Fear of current or ex partner: Not on file     Emotionally abused: Not on file     Physically abused: Not on file     Forced sexual activity: Not on file   Other Topics Concern     Parent/sibling w/ CABG, MI or angioplasty before 65F 55M? No      Service Not Asked     Blood Transfusions Not Asked     Caffeine Concern Yes     Comment: occas     Occupational Exposure Not Asked     Hobby Hazards Not Asked     Sleep Concern Not Asked     Stress Concern Not Asked     Weight Concern Not Asked     Special Diet Not Asked     Back Care Not Asked     Exercise Yes     Comment: as able     Bike Helmet Not Asked     Seat Belt Yes     Self-Exams Not Asked   Social History Narrative     Not on file       All above reviewed and updated, all stable unless otherwise noted    Recent labs reviewed    ROS:  Constitutional, HEENT, cardiovascular, pulmonary, GI, , musculoskeletal, neuro, skin, endocrine and psych systems are negative, except as otherwise noted.    OBJECTIVE:                                                    /66   Pulse 74   Temp 97.7  F (36.5  C) (Oral)   Ht 1.702 m (5' 7\")   Wt 114.3 kg (252 lb)   LMP 12/11/2011   SpO2 94%   BMI 39.47 kg/m    Body mass index is 39.47 kg/m .  GENERAL: healthy, alert and no distress  EYES: Eyes grossly normal to inspection  HENT:ear canals and TM's normal upon viewing with otoscope, nose and mouth without ulcers or lesions upon viewing with otoscope  NECK: no tenderness, no " adenopathy, no asymmetry, no masses, no stiffness; thyroid- normal to palpation  RESP: lungs clear to auscultation - no rales, no rhonchi, no wheezes  CV: regular rates and rhythm, normal S1 S2, no S3 or S4 and no murmur, no click or rub -  ABDOMEN: soft, no tenderness, no  hepatosplenomegaly, no masses, normal bowel sounds  MS: extremities- no gross deformities noted, no edema  SKIN: no suspicious lesions, no rashes, chronic venostasis with superficial skin breakdown greater on the right than the left   NEURO: strength and tone- normal, sensory exam- grossly normal, mentation- intact, speech- normal, reflexes- symmetric  BACK: no CVA tenderness, no paralumbar tenderness  PSYCH: Alert and oriented times 3; speech- coherent , normal rate and volume; able to articulate logical thoughts, able to abstract reason, no tangential thoughts, no hallucinations or delusions, affect- normal    DIAGNOSTICS/PROCEDURES:                                                      No results found for this or any previous visit (from the past 24 hour(s)).     ASSESSMENT:                                                        ICD-10-CM    1. Sepsis due to Escherichia coli (H) A41.51 Comprehensive metabolic panel     CBC with platelets and differential     *UA reflex to Microscopic and Culture ([facility] (except [place] and [place])     Urine Culture Aerobic Bacterial     Erythrocyte sedimentation rate auto     CRP inflammation     Urine Microscopic   2. Nephrolithiasis N20.0 *UA reflex to Microscopic and Culture ([facility] (except [place] and [place])   3. Other closed fracture of right femur with delayed healing, unspecified portion of femur, subsequent encounter S72.8X1G    4. Pressure injury of skin of sacral region, unspecified injury stage L89.159 WOUND CARE REFERRAL     CBC with platelets and differential     Erythrocyte sedimentation rate auto     CRP inflammation   5. Pressure injury of skin  of buttock, unspecified injury stage, unspecified laterality L89.309 WOUND CARE REFERRAL     CBC with platelets and differential     Erythrocyte sedimentation rate auto     CRP inflammation   6. Lymphedema I89.0 WOUND CARE REFERRAL     Comprehensive metabolic panel     CBC with platelets and differential     Erythrocyte sedimentation rate auto     CRP inflammation     BNP-N terminal pro   7. Acute on chronic respiratory failure with hypercapnia (H) J96.22 Comprehensive metabolic panel     CBC with platelets and differential     BNP-N terminal pro   8. MS (multiple sclerosis) (H) G35 Comprehensive metabolic panel     CBC with platelets and differential     BNP-N terminal pro   9. Chronic pain syndrome G89.4    10. 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 T-cell lymphoma (H) C85.90 CBC with platelets and differential   12. Type 2 diabetes mellitus with stage 1 chronic kidney disease, without long-term current use of insulin (H) E11.22 Comprehensive metabolic panel    N18.1 CBC with platelets and differential   13. CKD (chronic kidney disease) stage 1, GFR 90 ml/min or greater N18.1 Comprehensive metabolic panel     CBC with platelets and differential   14. Hyperlipidemia LDL goal <70 E78.5 Comprehensive metabolic panel   15. Hypertension goal BP (blood pressure) < 140/90 I10 Comprehensive metabolic panel   16. Moderate depressive episode (H) F32.1    17. Morbid obesity (H) E66.01    18. Medication monitoring encounter Z51.81 Comprehensive metabolic panel     CBC with platelets and differential     *UA reflex to Microscopic and Culture ([facility] (except [place] and [place])     Urine Culture Aerobic Bacterial     Erythrocyte sedimentation rate auto     CRP inflammation   19. Weight gain R63.5 BNP-N terminal pro   20. Abnormality of breathing  R06.9 BNP-N terminal pro       PLAN:                                                     Discussed treatment/modality options, including risk and benefits she desires:    advised 1 multivitamin per day, advised calcium 5003-2673 mg/d and Vitamin D 800-1200 IU/d, advised dentist every 6 months, advised diet and exercise and advised opthalmologist every 1-2 years.     1) Patient was seen off and on in the hospital since the end of January due to recurrent septic shock. Patient was admitted to hospital from 1/30/2019 to 2/15/2019, from 3/16/2019 to 3/26/2019, and from 4/10/2019-4/17/2017 all for treatment of sepsis. Patient reports she is improving. Patient has an appointment scheduled with Urology on 7/3/2019 for further urosepsis management. Recommend continued follow up.     2) Patient's Diabetes is well controlled. Patient is currently prescribed 500 mg Metformin daily for DM management. Advised continued use.     3) Patient's hyperlipidemia is uncontrolled. Patient is currently prescribed 10 mg Atorvastatin daily for hyperlipidemia management. Advised continued use.     4) Patient presents today as normotensive. Patient is currently prescribed 25 mg hydrochlorothiazide daily, 100 mg Losartan daily, and 50 mg Metoprolol Succinate daily for hypertension management. Advised continued use.     5) Face to Face form complete.    6) Follow up in 1 months for complete physical exam.     All diagnosis above reviewed and noted above, otherwise stable.  See [facility] orders for further details.     Return in about 1 month (around 7/21/2019), or if symptoms worsen or fail to improve, for Follow Up Chronic, Medication Recheck Visit.    Health Maintenance Due   Topic Date Due     DIABETIC FOOT EXAM  1965     EYE EXAM  1965     FIT  05/08/1973     ZOSTER IMMUNIZATION (1 of 2) 05/08/2013     DEXA  06/26/2017     LIPID  06/20/2019     MICROALBUMIN  06/20/2019     URINE DRUG SCREEN  06/20/2019     PAP  05/02/2019     MAMMO SCREENING  07/09/2019     This document serves as a record of the services and  decisions personally performed and made by Julius Hassan MD. It was created on his behalf by Gaurav Owen, a trained medical scribe. The creation of this document is based on the provider's statements to the medical scribe.  Gaurav Owen June 21, 2019 3:26 PM     The information in this document, created by the medical scribe for me, accurately reflects the services I personally performed and the decisions made by me. I have reviewed and approved this document for accuracy prior to leaving the patient care area.  June 21, 2019            Julius Hassan MD [address]  [id number] [phone] Fax

## 2019-06-21 ENCOUNTER — OFFICE VISIT (OUTPATIENT)
Dept: FAMILY MEDICINE | Facility: CLINIC | Age: 54
End: 2019-06-21
Payer: COMMERCIAL

## 2019-06-21 ENCOUNTER — MEDICAL CORRESPONDENCE (OUTPATIENT)
Dept: HEALTH INFORMATION MANAGEMENT | Facility: CLINIC | Age: 54
End: 2019-06-21

## 2019-06-21 VITALS
HEART RATE: 74 BPM | OXYGEN SATURATION: 94 % | HEIGHT: 67 IN | DIASTOLIC BLOOD PRESSURE: 66 MMHG | SYSTOLIC BLOOD PRESSURE: 130 MMHG | TEMPERATURE: 97.7 F | WEIGHT: 252 LBS | BODY MASS INDEX: 39.55 KG/M2

## 2019-06-21 DIAGNOSIS — N18.1 CKD (CHRONIC KIDNEY DISEASE) STAGE 1, GFR 90 ML/MIN OR GREATER: ICD-10-CM

## 2019-06-21 DIAGNOSIS — L89.309 PRESSURE INJURY OF SKIN OF BUTTOCK, UNSPECIFIED INJURY STAGE, UNSPECIFIED LATERALITY: ICD-10-CM

## 2019-06-21 DIAGNOSIS — E78.5 HYPERLIPIDEMIA LDL GOAL <70: ICD-10-CM

## 2019-06-21 DIAGNOSIS — F32.A MODERATE DEPRESSIVE EPISODE: ICD-10-CM

## 2019-06-21 DIAGNOSIS — J96.22 ACUTE ON CHRONIC RESPIRATORY FAILURE WITH HYPERCAPNIA (H): ICD-10-CM

## 2019-06-21 DIAGNOSIS — E11.22 TYPE 2 DIABETES MELLITUS WITH STAGE 1 CHRONIC KIDNEY DISEASE, WITHOUT LONG-TERM CURRENT USE OF INSULIN (H): ICD-10-CM

## 2019-06-21 DIAGNOSIS — G89.4 CHRONIC PAIN SYNDROME: ICD-10-CM

## 2019-06-21 DIAGNOSIS — G35 MS (MULTIPLE SCLEROSIS) (H): ICD-10-CM

## 2019-06-21 DIAGNOSIS — E66.01 MORBID OBESITY (H): ICD-10-CM

## 2019-06-21 DIAGNOSIS — C85.90 T-CELL LYMPHOMA (H): ICD-10-CM

## 2019-06-21 DIAGNOSIS — R06.9 ABNORMALITY OF BREATHING: ICD-10-CM

## 2019-06-21 DIAGNOSIS — Z51.81 MEDICATION MONITORING ENCOUNTER: ICD-10-CM

## 2019-06-21 DIAGNOSIS — N20.0 NEPHROLITHIASIS: ICD-10-CM

## 2019-06-21 DIAGNOSIS — N18.1 TYPE 2 DIABETES MELLITUS WITH STAGE 1 CHRONIC KIDNEY DISEASE, WITHOUT LONG-TERM CURRENT USE OF INSULIN (H): ICD-10-CM

## 2019-06-21 DIAGNOSIS — A41.51 SEPSIS DUE TO ESCHERICHIA COLI (H): Primary | ICD-10-CM

## 2019-06-21 DIAGNOSIS — R63.5 WEIGHT GAIN: ICD-10-CM

## 2019-06-21 DIAGNOSIS — I10 HYPERTENSION GOAL BP (BLOOD PRESSURE) < 140/90: ICD-10-CM

## 2019-06-21 DIAGNOSIS — L89.159 PRESSURE INJURY OF SKIN OF SACRAL REGION, UNSPECIFIED INJURY STAGE: ICD-10-CM

## 2019-06-21 DIAGNOSIS — S72.8X1G OTHER CLOSED FRACTURE OF RIGHT FEMUR WITH DELAYED HEALING, UNSPECIFIED PORTION OF FEMUR, SUBSEQUENT ENCOUNTER: ICD-10-CM

## 2019-06-21 DIAGNOSIS — I89.0 LYMPHEDEMA: ICD-10-CM

## 2019-06-21 DIAGNOSIS — C85.81 OTHER SPECIFIED TYPE OF NON-HODGKIN LYMPHOMA OF HEAD (H): ICD-10-CM

## 2019-06-21 LAB
ALBUMIN UR-MCNC: 100 MG/DL
APPEARANCE UR: CLEAR
BACTERIA #/AREA URNS HPF: ABNORMAL /HPF
BASOPHILS # BLD AUTO: 0 10E9/L (ref 0–0.2)
BASOPHILS NFR BLD AUTO: 0.2 %
BILIRUB UR QL STRIP: NEGATIVE
COLOR UR AUTO: YELLOW
DIFFERENTIAL METHOD BLD: ABNORMAL
EOSINOPHIL # BLD AUTO: 0.4 10E9/L (ref 0–0.7)
EOSINOPHIL NFR BLD AUTO: 2.3 %
ERYTHROCYTE [DISTWIDTH] IN BLOOD BY AUTOMATED COUNT: 15.4 % (ref 10–15)
ERYTHROCYTE [SEDIMENTATION RATE] IN BLOOD BY WESTERGREN METHOD: 79 MM/H (ref 0–30)
GLUCOSE UR STRIP-MCNC: NEGATIVE MG/DL
HCT VFR BLD AUTO: 35.2 % (ref 35–47)
HGB BLD-MCNC: 11 G/DL (ref 11.7–15.7)
HGB UR QL STRIP: ABNORMAL
KETONES UR STRIP-MCNC: NEGATIVE MG/DL
LEUKOCYTE ESTERASE UR QL STRIP: ABNORMAL
LYMPHOCYTES # BLD AUTO: 1.8 10E9/L (ref 0.8–5.3)
LYMPHOCYTES NFR BLD AUTO: 11.1 %
MCH RBC QN AUTO: 25.4 PG (ref 26.5–33)
MCHC RBC AUTO-ENTMCNC: 31.3 G/DL (ref 31.5–36.5)
MCV RBC AUTO: 81 FL (ref 78–100)
MONOCYTES # BLD AUTO: 0.8 10E9/L (ref 0–1.3)
MONOCYTES NFR BLD AUTO: 4.7 %
NEUTROPHILS # BLD AUTO: 12.9 10E9/L (ref 1.6–8.3)
NEUTROPHILS NFR BLD AUTO: 81.7 %
NITRATE UR QL: POSITIVE
NON-SQ EPI CELLS #/AREA URNS LPF: ABNORMAL /LPF
PH UR STRIP: 6 PH (ref 5–7)
PLATELET # BLD AUTO: 312 10E9/L (ref 150–450)
RBC # BLD AUTO: 4.33 10E12/L (ref 3.8–5.2)
RBC #/AREA URNS AUTO: ABNORMAL /HPF
SOURCE: ABNORMAL
SP GR UR STRIP: 1.02 (ref 1–1.03)
UROBILINOGEN UR STRIP-ACNC: 0.2 EU/DL (ref 0.2–1)
WBC # BLD AUTO: 15.8 10E9/L (ref 4–11)
WBC #/AREA URNS AUTO: ABNORMAL /HPF

## 2019-06-21 PROCEDURE — 86140 C-REACTIVE PROTEIN: CPT | Performed by: FAMILY MEDICINE

## 2019-06-21 PROCEDURE — 36415 COLL VENOUS BLD VENIPUNCTURE: CPT | Performed by: FAMILY MEDICINE

## 2019-06-21 PROCEDURE — 80053 COMPREHEN METABOLIC PANEL: CPT | Performed by: FAMILY MEDICINE

## 2019-06-21 PROCEDURE — 99214 OFFICE O/P EST MOD 30 MIN: CPT | Performed by: FAMILY MEDICINE

## 2019-06-21 PROCEDURE — 85025 COMPLETE CBC W/AUTO DIFF WBC: CPT | Performed by: FAMILY MEDICINE

## 2019-06-21 PROCEDURE — 87086 URINE CULTURE/COLONY COUNT: CPT | Performed by: FAMILY MEDICINE

## 2019-06-21 PROCEDURE — 85652 RBC SED RATE AUTOMATED: CPT | Performed by: FAMILY MEDICINE

## 2019-06-21 PROCEDURE — 83880 ASSAY OF NATRIURETIC PEPTIDE: CPT | Performed by: FAMILY MEDICINE

## 2019-06-21 PROCEDURE — 81001 URINALYSIS AUTO W/SCOPE: CPT | Performed by: FAMILY MEDICINE

## 2019-06-21 ASSESSMENT — MIFFLIN-ST. JEOR: SCORE: 1765.69

## 2019-06-21 NOTE — PATIENT INSTRUCTIONS
[facility]                        To reach your care team during and after hours:   [phone]  To reach our pharmacy:        [phone]    Clinic Hours                        Our clinic hours are:    Monday   7:30 am to 7:00 pm                  Tuesday through Friday 7:30 am to 5:00 pm                             Saturday   8:00 am to 12:00 pm      Sunday   Closed      Pharmacy Hours                        Our pharmacy hours are:    Monday   8:30 am to 7:00 pm       Tuesday to Friday  8:30 am to 6:00 pm                       Saturday    9:00 am to 1:00 pm              Sunday    Closed              There is also information available at our web site:  [email or web address]    If your provider ordered any lab tests and you do not receive the results within 10 business days, please call the clinic.    If you need a medication refill please contact your pharmacy.  Please allow 2-3 business days for your refill to be completed.    Our clinic offers telephone visits and e visits.  Please ask one of your team members to explain more.      Use [employer] (secure email communication and access to your chart) to send your primary care provider a message or make an appointment. Ask someone on your Team how to sign up for [employer].  Immunizations                      Immunization History   Administered Date(s) Administered     Influenza (H1N1) 12/10/2009     Influenza (IIV3) PF 12/10/2009, 11/06/2012, 12/08/2014     Influenza Vaccine IM 3yrs+ 4 Valent IIV4 10/21/2013, 09/19/2018     Influenza Vaccine, 3 YRS +, IM (QUADRIVALENT W/PRESERVATIVES) 09/28/2017     Pneumococcal 23 valent 01/05/2011     TDAP Vaccine (Adacel) 09/19/2018     Tdap (Adacel,Boostrix) 09/17/2008        Health Maintenance                         Health Maintenance Due   Topic Date Due     Diabetic Foot Exam  1965     Eye Exam  1965     FIT Test  05/08/1973     Zoster (Shingles) Vaccine (1 of 2) 05/08/2013     Osteoporosis  Screening  06/26/2017     Depression Assessment  03/19/2019     Cholesterol Lab  06/20/2019     Kidney Function Urine Test  06/20/2019     URINE DRUG SCREEN  06/20/2019     PAP  05/02/2019     Mammogram  07/09/2019

## 2019-06-22 LAB
ALBUMIN SERPL-MCNC: 3 G/DL (ref 3.4–5)
ALP SERPL-CCNC: 116 U/L (ref 40–150)
ALT SERPL W P-5'-P-CCNC: 23 U/L (ref 0–50)
ANION GAP SERPL CALCULATED.3IONS-SCNC: 6 MMOL/L (ref 3–14)
AST SERPL W P-5'-P-CCNC: 12 U/L (ref 0–45)
BACTERIA SPEC CULT: NORMAL
BILIRUB SERPL-MCNC: 0.2 MG/DL (ref 0.2–1.3)
BUN SERPL-MCNC: 17 MG/DL (ref 7–30)
CALCIUM SERPL-MCNC: 8.3 MG/DL (ref 8.5–10.1)
CHLORIDE SERPL-SCNC: 103 MMOL/L (ref 94–109)
CO2 SERPL-SCNC: 30 MMOL/L (ref 20–32)
CREAT SERPL-MCNC: 0.5 MG/DL (ref 0.52–1.04)
CRP SERPL-MCNC: 43 MG/L (ref 0–8)
GFR SERPL CREATININE-BSD FRML MDRD: >90 ML/MIN/{1.73_M2}
GLUCOSE SERPL-MCNC: 94 MG/DL (ref 70–99)
NT-PROBNP SERPL-MCNC: 202 PG/ML (ref 0–125)
POTASSIUM SERPL-SCNC: 3.8 MMOL/L (ref 3.4–5.3)
PROT SERPL-MCNC: 8.4 G/DL (ref 6.8–8.8)
SODIUM SERPL-SCNC: 139 MMOL/L (ref 133–144)
SPECIMEN SOURCE: NORMAL

## 2019-06-24 ENCOUNTER — TELEPHONE (OUTPATIENT)
Dept: FAMILY MEDICINE | Facility: CLINIC | Age: 54
End: 2019-06-24

## 2019-06-24 ASSESSMENT — ANXIETY QUESTIONNAIRES
5. BEING SO RESTLESS THAT IT IS HARD TO SIT STILL: NOT AT ALL
2. NOT BEING ABLE TO STOP OR CONTROL WORRYING: NOT AT ALL
7. FEELING AFRAID AS IF SOMETHING AWFUL MIGHT HAPPEN: NOT AT ALL
3. WORRYING TOO MUCH ABOUT DIFFERENT THINGS: NOT AT ALL
1. FEELING NERVOUS, ANXIOUS, OR ON EDGE: NOT AT ALL
6. BECOMING EASILY ANNOYED OR IRRITABLE: NOT AT ALL
GAD7 TOTAL SCORE: 0
IF YOU CHECKED OFF ANY PROBLEMS ON THIS QUESTIONNAIRE, HOW DIFFICULT HAVE THESE PROBLEMS MADE IT FOR YOU TO DO YOUR WORK, TAKE CARE OF THINGS AT HOME, OR GET ALONG WITH OTHER PEOPLE: NOT DIFFICULT AT ALL

## 2019-06-24 ASSESSMENT — PATIENT HEALTH QUESTIONNAIRE - PHQ9
SUM OF ALL RESPONSES TO PHQ QUESTIONS 1-9: 4
5. POOR APPETITE OR OVEREATING: NOT AT ALL

## 2019-06-24 NOTE — TELEPHONE ENCOUNTER
Completed forms faxed back to [facility] at [phone].   Originals sent to be scanned.     Katharina Goodwin

## 2019-06-25 ENCOUNTER — TELEPHONE (OUTPATIENT)
Dept: FAMILY MEDICINE | Facility: CLINIC | Age: 54
End: 2019-06-25

## 2019-06-25 DIAGNOSIS — E11.22 TYPE 2 DIABETES MELLITUS WITH STAGE 1 CHRONIC KIDNEY DISEASE, WITHOUT LONG-TERM CURRENT USE OF INSULIN (H): ICD-10-CM

## 2019-06-25 DIAGNOSIS — J96.02 ACUTE HYPERCAPNIC RESPIRATORY FAILURE (H): Primary | ICD-10-CM

## 2019-06-25 DIAGNOSIS — E66.01 MORBID OBESITY (H): ICD-10-CM

## 2019-06-25 DIAGNOSIS — M79.605 CHRONIC PAIN OF BOTH LOWER EXTREMITIES: ICD-10-CM

## 2019-06-25 DIAGNOSIS — G35 MS (MULTIPLE SCLEROSIS) (H): ICD-10-CM

## 2019-06-25 DIAGNOSIS — I10 HYPERTENSION GOAL BP (BLOOD PRESSURE) < 140/90: ICD-10-CM

## 2019-06-25 DIAGNOSIS — N18.1 TYPE 2 DIABETES MELLITUS WITH STAGE 1 CHRONIC KIDNEY DISEASE, WITHOUT LONG-TERM CURRENT USE OF INSULIN (H): ICD-10-CM

## 2019-06-25 DIAGNOSIS — G89.29 CHRONIC PAIN OF BOTH LOWER EXTREMITIES: ICD-10-CM

## 2019-06-25 DIAGNOSIS — M79.604 CHRONIC PAIN OF BOTH LOWER EXTREMITIES: ICD-10-CM

## 2019-06-25 DIAGNOSIS — Z76.89 HEALTH CARE HOME: ICD-10-CM

## 2019-06-25 ASSESSMENT — ANXIETY QUESTIONNAIRES: GAD7 TOTAL SCORE: 0

## 2019-06-25 NOTE — TELEPHONE ENCOUNTER
Home care PT hilary     Asking if pt can handle lymphedema treatment through PT  - is her legs safe to use compression?  The swelling started for an unknown reason while in TCU and the compression did not help at all is compression the right type of treatment or does MD BOSTON have other suggestions?     Best # [phone] [facility]     Please review and advise     Thank you     Cordelia Stevens RN, BSN  [place] [facility]

## 2019-06-26 DIAGNOSIS — I10 HYPERTENSION GOAL BP (BLOOD PRESSURE) < 140/90: ICD-10-CM

## 2019-06-26 RX ORDER — LOSARTAN POTASSIUM AND HYDROCHLOROTHIAZIDE 25; 100 MG/1; MG/1
TABLET ORAL
Qty: 30 TABLET | Refills: 0 | OUTPATIENT
Start: 2019-06-26

## 2019-06-26 RX ORDER — GABAPENTIN 600 MG/1
TABLET ORAL
Qty: 270 TABLET | Refills: 3 | Status: SHIPPED | OUTPATIENT
Start: 2019-06-26 | End: 2019-07-29

## 2019-06-26 NOTE — TELEPHONE ENCOUNTER
Message handled by Nurse Triage with [facility] - provider name: MD BOSTON - based on last assessment on 6/21, OK for compression. Any new or worsening symptoms, need OV to reassess. (for compression, would start low and go slow. Start with compression wraps most likely - leave that to RN discretion as she is certified in lymphedema)    Called FREDO Ortiz @ # below - left detailed VM with MD BOSTON message above.     Ivanna Kelly RN  [place] [facility]

## 2019-06-26 NOTE — TELEPHONE ENCOUNTER
Reason for Call:  Other prescription    Detailed comments: Pt called this afternoon and would like a refill on her losartan. The pt stated that the refill request for this medication was denied, however, the pt just saw Dr. Hassan on Friday (6/21) and does not want to come back in to see him to get this refilled. Please give pt a call if there are any questions. Thank you.    Phone Number Patient can be reached at: Home number on file [phone] (home)    Best Time:     Can we leave a detailed message on this number? YES    Call taken on 6/26/2019 at 3:10 PM by Elidia Blanchard

## 2019-06-26 NOTE — TELEPHONE ENCOUNTER
Diana calling    Stated patient came from TCU with orders for lymphedema treatment. When FREDO Ortiz completed the lymphedema assessment on 6/13/19 and saw that the patient's legs were very red. Was not sure if it was arterial disease or cellulitis. Patient had sudden onset of bilateral LLE swelling - with those diagnoses compression treatment is not advised.   Stated patient had lymphedema treatment while in the TCU with no results.     Stated patient was seen by MD BOSTON on 6/21. Per OV Notes:   Chronic venous stasis, lymphedema: At risk for cellulitis slight pink color to her legs, but they do not appear infected at present.  Does have bilateral 1+ lower extremity edema.  Reports negative DVT US at TCU within the past few days  -Resumed Lasix 40 mg p.o. Daily       RN is certified in treating lymphedema, but when there is contraindications for compression stockings.   RN wondering if patient needs further testing is needed or if she can start using compression stockings? Patient had been using compression stockings in the TCU until her legs were so swollen that they would not fit into stockings.     Phone call was disconnected.   RN attempted to call Diana back - Diana answered then phone was disconnected again.     Patient has not been using compression stockings since leaving the TCU and RN was unsure if she should be using the compression stockings due to concerns noted above.     Routing to PCP for further review/recommendations/orders.  Can RN start using compression? Is it safe?      Ivanna Kelly RN  [facility]

## 2019-06-26 NOTE — TELEPHONE ENCOUNTER
Gabapentin      Last Written Prescription Date:  3/18/2019  Last Fill Quantity: -,   # refills: -  Last Office Visit: 6/21/2019  Future Office visit:    Next 5 appointments (look out 90 days)    Jul 29, 2019  3:00 PM CDT  [facility] [address] [place] [id number]  [phone]           Routing refill request to provider for review/approval because:  Drug not on the [facility], [facility] or  Health refill protocol or controlled substance  Medication was discontinued at hospitalization however, advised to continue at discharge per notes.  Ness CARVALHO RN   [facility]

## 2019-06-26 NOTE — TELEPHONE ENCOUNTER
Home Care referral placed for ASAP lymphedema consult    Called # below - Left a detailed message with MD BOSTON message below and that referral was placed.    Ivanna Kelly RN  [facility]

## 2019-06-27 RX ORDER — LOSARTAN POTASSIUM 50 MG/1
100 TABLET ORAL DAILY
Qty: 90 TABLET | Refills: 1 | Status: SHIPPED | OUTPATIENT
Start: 2019-06-27 | End: 2019-07-26

## 2019-06-27 NOTE — TELEPHONE ENCOUNTER
Called to check with the patient on the reason for denial.  Per 6/21/19 visit, PCP recommended continued use of losartan 100 mg daily.  Refill e-scripted to [facility] pharmacy.    LVM for the patient relaying refill done; phone number for questions or concerns provided. Haven Marsh RN June 27, 2019 3:33 PM

## 2019-06-29 ENCOUNTER — MYC MEDICAL ADVICE (OUTPATIENT)
Dept: FAMILY MEDICINE | Facility: CLINIC | Age: 54
End: 2019-06-29

## 2019-06-29 DIAGNOSIS — G35 MS (MULTIPLE SCLEROSIS) (H): ICD-10-CM

## 2019-06-29 DIAGNOSIS — G89.4 CHRONIC PAIN SYNDROME: Chronic | ICD-10-CM

## 2019-07-01 ENCOUNTER — TELEPHONE (OUTPATIENT)
Dept: FAMILY MEDICINE | Facility: CLINIC | Age: 54
End: 2019-07-01

## 2019-07-01 RX ORDER — OXYCODONE HYDROCHLORIDE 5 MG/1
TABLET ORAL
Qty: 30 TABLET | Refills: 0 | Status: SHIPPED | OUTPATIENT
Start: 2019-07-01 | End: 2019-07-26 | Stop reason: DRUGHIGH

## 2019-07-01 NOTE — TELEPHONE ENCOUNTER
See mychart clarification from patient regarding dose.  New order pending.      Chandrika Nathan, LILLIANA, RN, PHN  [facility]) [phone]

## 2019-07-01 NOTE — TELEPHONE ENCOUNTER
Reason for Call:  Other prescription    Detailed comments: PT called wondering if refill for med is ready at  yet, and if anyone else can fill it since dr Hassan if out of town.     Phone Number Patient can be reached at: Cell number on file:    Telephone Information:   Mobile [phone]       Best Time: any    Can we leave a detailed message on this number? YES    Call taken on 7/1/2019 at 2:11 PM by Ivelisse Wu

## 2019-07-01 NOTE — TELEPHONE ENCOUNTER
Patient states this prescription is written incorrectly.  States she generally receives 120 pills for 30 days.  She received the 30 pill prescription in April because she was in a TCU - this was from a different prescriber.      Please update or advise.    They are going fill the one that is written but would like to have an additional prescription written for the remainder of the month.        Katharina Goodwin

## 2019-07-01 NOTE — TELEPHONE ENCOUNTER
Called and let patient know Rx is at .     Also, per JV note told her she needed to schedule an OV before next refill for CSA agreement.     Patient says she just saw Dr. Hassan on 6/21 and wants to know if she has to come back in again already.     Please call patient and advise.       Jolene Polo

## 2019-07-01 NOTE — TELEPHONE ENCOUNTER
Pt due for renewal of controlled substance agreement  . Done X 1 only for Dr. Julius Hassan  out of office. .     Please assist pt in making appt to be seen for the above.

## 2019-07-01 NOTE — TELEPHONE ENCOUNTER
Consent to communicate on 5/11/18 for Fernando (spouse)    My chart message sent     Anival Goodwin RN   [place] [facility]

## 2019-07-01 NOTE — TELEPHONE ENCOUNTER
Reason for Call:  Other call back     Detailed comments: Pt called this morning and would like to speak with a nurse in regards to her pain medication she normally gets through Dr. Heranndez. Please give pt a call back when you can- pt did NOT want to disclose any further information with me. Thank you.     Phone Number Patient can be reached at: Home number on file [phone] (home)     Best Time:      Can we leave a detailed message on this number? YES     Call taken on 7/1/2019 at 11:59 AM by Elidia Blanchard

## 2019-07-01 NOTE — TELEPHONE ENCOUNTER
See mTraks message.      Chandrika Nathan, BS, RN, PHN  [facility]) [phone]

## 2019-07-01 NOTE — TELEPHONE ENCOUNTER
I spoke with patient and she verified her she takes Oxycodone 15 mg (3 tablets) every 4-6 hours as needed for severe pain.  Order pending. Patient is all out of medication today.    Please call her when prescription is ready to be picked up.    Routing to /[facility] provider pool.      LILLIANA Deng, RN, N  [facility]) [phone]

## 2019-07-02 ENCOUNTER — TELEPHONE (OUTPATIENT)
Dept: FAMILY MEDICINE | Facility: CLINIC | Age: 54
End: 2019-07-02

## 2019-07-02 RX ORDER — OXYCODONE HYDROCHLORIDE 15 MG/1
15 TABLET ORAL EVERY 6 HOURS PRN
Qty: 120 TABLET | Refills: 0 | Status: SHIPPED | OUTPATIENT
Start: 2019-07-02 | End: 2019-07-29

## 2019-07-02 NOTE — TELEPHONE ENCOUNTER
Routing to TC.  Rx should be in [facility], please notify patient.  LILLIANA PowerN, RN  [facility]

## 2019-07-02 NOTE — TELEPHONE ENCOUNTER
Reason for Call:  Other prescription    Detailed comments: The patient is calling saying she received the wrong dosage of her oxycodone. She says it should be 15 mg and not the 5 mg for 30 days. She would like a nurse to call her.    Phone Number Patient can be reached at: Cell number on file:    Telephone Information:   Mobile [phone]     Best Time: Anytime    Can we leave a detailed message on this number? YES    Call taken on 7/2/2019 at 9:24 AM by Tiffany Wood

## 2019-07-05 NOTE — TELEPHONE ENCOUNTER
rx picked up by spouse Fernando on 7/2/19.    Dr Hassan please see note below regarding need for follow up, please advise.    Katharina Goodwin

## 2019-07-09 ENCOUNTER — HOSPITAL ENCOUNTER (OUTPATIENT)
Dept: WOUND CARE | Facility: CLINIC | Age: 54
Discharge: HOME | End: 2019-07-09
Attending: PHYSICIAN ASSISTANT | Admitting: PHYSICIAN ASSISTANT
Payer: COMMERCIAL

## 2019-07-09 VITALS
TEMPERATURE: 97.8 F | RESPIRATION RATE: 18 BRPM | HEART RATE: 86 BPM | DIASTOLIC BLOOD PRESSURE: 84 MMHG | SYSTOLIC BLOOD PRESSURE: 153 MMHG

## 2019-07-09 DIAGNOSIS — L89.324 PRESSURE INJURY OF LEFT BUTTOCK, STAGE 4 (H): Primary | ICD-10-CM

## 2019-07-09 PROCEDURE — 11042 DBRDMT SUBQ TIS 1ST 20SQCM/<: CPT | Performed by: PHYSICIAN ASSISTANT

## 2019-07-09 PROCEDURE — G0463 HOSPITAL OUTPT CLINIC VISIT: HCPCS | Mod: 25

## 2019-07-09 PROCEDURE — 11042 DBRDMT SUBQ TIS 1ST 20SQCM/<: CPT

## 2019-07-09 PROCEDURE — 99203 OFFICE O/P NEW LOW 30 MIN: CPT | Mod: 25 | Performed by: PHYSICIAN ASSISTANT

## 2019-07-09 RX ORDER — LOSARTAN POTASSIUM AND HYDROCHLOROTHIAZIDE 25; 100 MG/1; MG/1
TABLET ORAL
COMMUNITY
Start: 2019-01-22 | End: 2019-10-22

## 2019-07-09 NOTE — DISCHARGE INSTRUCTIONS
[facility]  [address] [facility] [address], [place] [address]  Appointment Phone [phone] Nurse Advisors [phone]  [facility] Phone:[phone]  Fax:[phone]     Amanda Richardson      1965    Wound Dressing Change:left gluteal wound  Cleanse inside wound with saline or wound cleanser  Skin Care: Use Skin Prep to romeo-wound skin  NPWT using black foam and pressure set to 125mmHg continuous suction.  Change dressing MWF or as indicated.       Wound Dressing Change: open areas on lower extremities  Xeroform gauze to open areas, cover with ABDs  Compression stockings to bilateral lower extremities  Follow up with lymphedema for lymphedema therapy.    Repositioning:    Bed:  Reposition pt MINIMALLY every 1-2 hours in bed to relieve pressure and promote perfusion to tissue.     Chair:  When up to chair pt should not sit for longer than one hour total before either standing or returning to bed for at least 10 minutes, again to relieve pressure and promote perfusion to tissue.     o Pt should also sit on a chair cushion when up to the chair.        Liya Robles PA-C. July 9, 2019    Call us at [phone] if you have any questions about your wounds, have redness or swelling around your wound, have a fever of 101 or greater or if you have any other problems or concerns. We answer the phone Monday through Friday 8 am to 4 pm, please leave a message as we check the voicemail frequently throughout the day.     Follow up with Provider - 3-4 weeks     A diet high in protein is important for wound healing, we recommend getting 60-90 grams of protein per day. Taking protein shakes or bars are a good way to get extra protein in your diet.     Good sources of protein:  Pork 26g per 3 oz  Whey protein powder - 24g per scoop (on average)  Greek yogurt - 23g per 8oz   Chicken or Turkey - 23g per 3oz  Fish - 20-25g per 3oz  Beef - 18-23g per 3oz  Navy beans - 20g  per cup  Cottage cheese - 14g per 1/2 cup   Lentils - 13g per 1/4 cup  Beef jerky 13g per 1oz  2% milk - 8g per cup  Peanut butter - 8g per 2 tablespoons  Eggs - 6g per egg  Mixed nuts - 6g per 2oz

## 2019-07-09 NOTE — PROGRESS NOTES
Patient arrived for wound care visit. Certified Wound Care Nurse time spent evaluating patient record, completed a full evaluation and documented wound & romeo-wound skin; provided recommendation based on treatment plan. Applied dressing, reviewed discharge instructions, patient education, and discussed plan of care with appropriate medical team staff members and patient and .

## 2019-07-09 NOTE — PROGRESS NOTES
[facility]    HISTORY OF PRESENT ILLNESS:   Amanda Richardson is a 54 year old, wheelchair-bound female with MS who presents today for a sacrococcygeal pressure ulcer. She developed this sometime in March 2019. Struggling with urinary issues in March and April and was in and out of the hospital and TCUs. Now back at home utilizing NPWT. Denies history of osteomyelitis.    Also struggles with lymphedema of lower legs with blisters off and on. Has worked with a lymphedema therapist but unsure whether she should be using dressings and wraps together.     CURRENT/PREVIOUS DRESSING: NPWT    COMPRESSION/OFFLOADING: repositioning frequently, not utilizing any offloading cushions, sleeps on standard bed    BARRIERS TO HEALING: immobility, obesity, pre-diabetes    DATE WOUND ACQUIRED: 3/2019, debrided    REVIEW OF SYSTEMS:  CONSTITUTIONAL: Denies fevers or acute illness  ENDOCRINE: pre-diabetic    PAST MEDICAL HISTORY:  has a past medical history of Abnormality of gait (7/27/2012), Arrhythmia, Chronic pain, CKD (chronic kidney disease) stage 1, GFR 90 ml/min or greater, Colon polyps (1/15), Gallstone (6/11/2012), Hyperlipidemia LDL goal <70, Hypertension goal BP (blood pressure) < 140/90, Leukocytosis (6/11/2012), Moderate depressive episode (H), MS (multiple sclerosis) (H) (2003), Multiple sclerosis (H), Non Hodgkin's lymphoma (H) (06/11/2012), Nonallopathic lesion of cervical region, not elsewhere classified (9/24/2012), Nonallopathic lesion of thoracic region, not elsewhere classified (9/24/2012), Numbness and tingling, Obesity (6/11/2012), Other chronic pain, Pain in joint, pelvic region and thigh (7/20/2012), Prediabetes, S/P right hip fracture, Spinal stenosis, lumbar (6/17/2012), T-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    SOCIAL HISTORY: , lives at home with Fernando who applies her wound VAC 1-2x/wk and home care once  weekly  TOBACCO STATUS:  reports that she quit smoking about 5 years ago. Her smoking use included cigarettes. She smoked 0.50 packs per day. She has never used smokeless tobacco.    MEDICATIONS:   Current Outpatient Medications   Medication     acetaminophen (TYLENOL) 325 MG tablet     amitriptyline (ELAVIL) 100 MG tablet     atorvastatin (LIPITOR) 10 MG tablet     baclofen (LIORESAL) 10 MG tablet     baclofen (LIORESAL) intraTHECAL Internal Pump     gabapentin (NEURONTIN) 300 MG capsule     gabapentin (NEURONTIN) 600 MG tablet     hydrochlorothiazide (MICROZIDE) 12.5 MG capsule     losartan (COZAAR) 50 MG tablet     losartan-hydrochlorothiazide (HYZAAR) 100-25 MG tablet     metFORMIN (GLUCOPHAGE) 500 MG tablet     metoprolol succinate (TOPROL-XL) 50 MG 24 hr tablet     Multiple Vitamin (MULTI-VITAMIN PO)     omega-3 fatty acids (FISH OIL) 1200 MG capsule     order for DME     oxyCODONE (ROXICODONE) 5 MG tablet     oxyCODONE IR (ROXICODONE) 15 MG tablet     senna-docusate (SENOKOT-S/PERICOLACE) 8.6-50 MG tablet     No current facility-administered medications for this encounter.        VITALS: /84   Pulse 86   Temp 97.8  F (36.6  C) (Temporal)   Resp 18   LMP 12/11/2011      PERTINENT LABS:    Recent Labs   Lab Test 06/21/19  1611  03/17/19  0615  10/03/13  1614   ALBUMIN 3.0*   < > 2.1*   < > 3.6*   HGB 11.0*   < > 9.4*   < > 12.7   INR  --   --   --   --  0.93   WBC 15.8*   < > 31.1*   < > 14.4*   A1C  --   --  6.3*   < >  --    CRP 43.0*  --   --    < >  --     < > = values in this interval not displayed.     PHYSICAL EXAM:  GENERAL: Patient is alert and oriented and in no acute distress  INTEGUMENTARY:   Left lower leg scattered ulcerations  Wound (used by OP [facility] only) 07/09/19 1527 Left coccyx pressure injury (Active)   Length (cm) 2.2 7/9/2019  3:00 PM   Width (cm) 1.5 7/9/2019  3:00 PM   Depth (cm) 4 7/9/2019  3:00 PM   Wound (cm^2) 3.3 cm^2 7/9/2019  3:00 PM   Wound Volume (cm^3) 13.2 cm^3  7/9/2019  3:00 PM   Tunneling [Depth (cm)/Location] 11Oclock/Depth 4.7 7/9/2019  3:00 PM   Dressing Appearance moist drainage 7/9/2019  3:00 PM   Drainage Characteristics/Odor serosanguineous 7/9/2019  3:00 PM   Drainage Amount moderate 7/9/2019  3:00 PM   Thickness/Stage Stage 4 7/9/2019  3:00 PM   Base red/granulating 7/9/2019  3:00 PM   Red (%), Wound Tissue Color 100 7/9/2019  3:00 PM   Periwound intact 7/9/2019  3:00 PM   Periwound Temperature warm 7/9/2019  3:00 PM   Periwound Skin Turgor soft 7/9/2019  3:00 PM   Edges open 7/9/2019  3:00 PM   Care, Wound debrided 7/9/2019  3:00 PM             PROCEDURE: 4% topical lidocaine was applied to the wound by nursing staff. Patient was determined to be capable of making their own medical decisions and informed consent was obtained. Using a sharp curette a surgical debridement was performed down to and including subcutaneous tissue of <20 cm2. Hemostasis was achieved with pressure. The patient tolerated the procedure well.      ASSESSMENT:   1. Stage 3 pressure ulcer of sacrococcygeal  2. Adherence to plan of care - unknown as this is initial assessment  3. Lymphedema of bilateral lower legs  4. Full-thickness ulceration of left lower leg with fat-layer exposed    PLAN/DISCUSSION:   1. Wound care plan: continue NPWT  2. Discussed possibility of osteo, patient would like to hold off on MRI for now but we will pursue this if wound stalls  3. Recommended re-starting lymphedema protocol, dress weeping areas with xeroform and ABD pads    FOLLOW-UP: 3-4 weeks    AILEEN CARD PA-C (DYKSTRA)

## 2019-07-16 ENCOUNTER — TELEPHONE (OUTPATIENT)
Dept: WOUND CARE | Facility: CLINIC | Age: 54
End: 2019-07-16

## 2019-07-16 NOTE — PATIENT INSTRUCTIONS
How to prepare the solution:    1. Mix 2 tablespoons of white household vinegar with 2 cups of water  2. Store in the refrigerator and use for up to 5 days    How to use the solution:    1. Soak gauze with vinegar solution and apply to wound for 5 mintues  2. Remove wet gauze  3. Perform daily    Liya Robles PA-C

## 2019-07-16 NOTE — TELEPHONE ENCOUNTER
S/s noted by home care nurse Cordelia Hurt:     New Wound Care orders :   Vinegar Soaks    How to prepare the solution:    1. Mix 2 tablespoons of white household vinegar with 2 cups of water  2. Store in the refrigerator and use for up to 5 days    How to use the solution:    1. Soak gauze with vinegar solution and apply to wound for 5 mintues  2. Remove wet gauze  3. Perform daily    cover with ABDs  Continue with lymphedema wraps  Compression stockings to bilateral lower extremities  Follow up with lymphedema for lymphedema therapy.  Change dressings daily.    Liya Hagen PA-C

## 2019-07-17 ENCOUNTER — OFFICE VISIT (OUTPATIENT)
Dept: UROLOGY | Facility: CLINIC | Age: 54
End: 2019-07-17
Payer: COMMERCIAL

## 2019-07-17 VITALS — BODY MASS INDEX: 39.55 KG/M2 | OXYGEN SATURATION: 97 % | HEIGHT: 67 IN | WEIGHT: 252 LBS | HEART RATE: 74 BPM

## 2019-07-17 DIAGNOSIS — R33.9 URINARY RETENTION: Primary | ICD-10-CM

## 2019-07-17 PROCEDURE — 99213 OFFICE O/P EST LOW 20 MIN: CPT | Performed by: PHYSICIAN ASSISTANT

## 2019-07-17 ASSESSMENT — PAIN SCALES - GENERAL: PAINLEVEL: MODERATE PAIN (5)

## 2019-07-17 ASSESSMENT — MIFFLIN-ST. JEOR: SCORE: 1765.69

## 2019-07-17 NOTE — PROGRESS NOTES
July 17, 2019      CC: Urinary retention    HPI:  Amanda Richardson is a 54 year old female who presents in HFU of the above. She had a prolonged hospitalization earlier this year for urosepsis. She was found to have an obstructing ureteral stone. She underwent stent placement and eventual left ureteroscopy. She struggled with urinary retention and is here for recheck of a PVR. She has MS and is in a wheelchair.     Back to her baseline. Has incontinence.     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 Cystoscopy 2. LEFT retrograde pyelogram 3. LEFT JJ stent placement 4. <1hr physician fluoroscopy time;  Surgeon: Epifanio Sapp MD;  Location: [facility] OR     COMBINED CYSTOSCOPY, RETROGRADES, URETEROSCOPY, LASER HOLMIUM LITHOTRIPSY URETER(S), INSERT STENT Left 3/15/2019    Procedure: Cystoscopy, left ureteral stent exchange, left retrograde pyelogram, interpretation of fluoroscopic images, left ureteroscopy with holmium lithotripsy and stone basketing, 22 modifier for difficult lengthy case.;  Surgeon: Mayito Chauhan MD;  Location: [facility] OR     CYSTOSCOPY       CYSTOSCOPY, REMOVE STENT(S), COMBINED Left 3/27/2019    Procedure: Flexible cystoscopy with left ureteral stent removal;  Surgeon: Mayito Chauhan MD;  Location: [facility] OR     FUSION LUMBAR ANTERIOR, FUSION LUMBAR POSTERIOR TWO LEVELS, COMBINED  10/17/2013    lumbar fusion - Dr Floyd     INSERT PUMP BACLOFEN  04/2017    intrathecal baclofen pump implantation     IRRIGATION AND DEBRIDEMENT LOWER EXTREMITY, COMBINED Right 2015    Right ankle I&D d/t infection     OPEN REDUCTION INTERNAL FIXATION ANKLE  5/15    Right Bimalleolar ankle fx ORIF     OPEN REDUCTION INTERNAL FIXATION ANKLE Right 11/2015    Revision due to spasms pulling screws out of ankle     SINUS SURGERY  2011    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     XR LUMBAR EPIDURAL INJECTION INCL IMAGING  3/14    Left L4-5 Epidural Dr Winter       Social History     Socioeconomic History     Marital status:      Spouse name: Fernando     Number of children: 3     Years of education: 14     Highest education level: Not on file   Occupational History     Employer: UNEMPLOYED   Social Needs     Financial resource strain: Not on file     Food insecurity:     Worry: Not on file     Inability: Not on file     Transportation needs:      Medical: Not on file     Non-medical: Not on file   Tobacco Use     Smoking status: Former Smoker     Packs/day: 0.50     Types: Cigarettes     Last attempt to quit: 2013     Years since quittin.9     Smokeless tobacco: Never Used     Tobacco comment: since age 17   Substance and Sexual Activity     Alcohol use: No     Drug use: No     Sexual activity: Yes     Partners: Male   Lifestyle     Physical activity:     Days per week: Not on file     Minutes per session: Not on file     Stress: Not on file   Relationships     Social connections:     Talks on phone: Not on file     Gets together: Not on file     Attends [employer] service: Not on file     Active member of club or organization: Not on file     Attends meetings of clubs or organizations: Not on file     Relationship status: Not on file     Intimate partner violence:     Fear of current or ex partner: Not on file     Emotionally abused: Not on file     Physically abused: Not on file     Forced sexual activity: Not on file   Other Topics Concern     Parent/sibling w/ CABG, MI or angioplasty before 65F 55M? No      Service Not Asked     Blood Transfusions Not Asked     Caffeine Concern Yes     Comment: occas     Occupational Exposure Not Asked     Hobby Hazards Not Asked     Sleep Concern Not Asked     Stress Concern Not Asked     Weight Concern Not Asked     Special Diet Not Asked     Back Care Not Asked     Exercise Yes     Comment: as able     Bike Helmet Not Asked     Seat Belt Yes     Self-Exams Not Asked   Social History Narrative     Not on file       Family History   Problem Relation Age of Onset     C.A.D. Father         with CHF      Cancer Father         ? unsure type - abdominal      Hypertension Mother      Thyroid Disease Mother         goiter      Breast Cancer Sister 58     Thyroid Disease Son      Cancer - colorectal No family hx of        ROS:14 point ROS neg other than the symptoms noted above in the HPI.    Allergies   Allergen  "Reactions     Lisinopril      Lip swelling     Cyclobenzaprine Other (See Comments)     Per 4-10-17 H&P by Yanna Dugan PA-C.     Flexeril [Cyclobenzaprine Hcl]      Got confused        Current Outpatient Medications   Medication     acetaminophen (TYLENOL) 325 MG tablet     amitriptyline (ELAVIL) 100 MG tablet     atorvastatin (LIPITOR) 10 MG tablet     baclofen (LIORESAL) 10 MG tablet     baclofen (LIORESAL) intraTHECAL Internal Pump     gabapentin (NEURONTIN) 300 MG capsule     hydrochlorothiazide (MICROZIDE) 12.5 MG capsule     losartan (COZAAR) 50 MG tablet     losartan-hydrochlorothiazide (HYZAAR) 100-25 MG tablet     metFORMIN (GLUCOPHAGE) 500 MG tablet     metoprolol succinate (TOPROL-XL) 50 MG 24 hr tablet     Multiple Vitamin (MULTI-VITAMIN PO)     omega-3 fatty acids (FISH OIL) 1200 MG capsule     order for DME     oxyCODONE (ROXICODONE) 5 MG tablet     oxyCODONE IR (ROXICODONE) 15 MG tablet     senna-docusate (SENOKOT-S/PERICOLACE) 8.6-50 MG tablet     gabapentin (NEURONTIN) 600 MG tablet     No current facility-administered medications for this visit.          PEx:   Pulse 74, height 1.702 m (5' 7\"), weight 114.3 kg (252 lb), last menstrual period 12/11/2011, SpO2 97 %, not currently breastfeeding.  5' 7\", Body mass index is 39.47 kg/m ., 252 lbs 0 oz  Gen appearance:  Well groomed,: age-appropriate appearing female in NAD.   HEENT:  EOMI, AT NC, CN grossly normal  Psych:  alert , comfortable in no acute distress  Neuro:  A/O X 3  Skin:  Warm to touch, clear of rashes, ecchymoses  Resp:  No increased respiratory effort  lymph:  No LE edema  Abd:  Soft/NT, ND, no palpable masses, no CVAT  Back: bony spine is non-tender, flanks are nontender    Bladder scan: 0cc      A/P: Amanda Richardson is a 54 year old female with hx of kidney stones and urinary retention  -Bladder scan normal today.  -Discussed obtaining baseline UDS given her MS. She will think about this.  -PVR annually and RTO PRN.    Lori " DAYAN Sen  [facility]    20 minutes were spent with the patient today, > 50% in counseling and coordination of care

## 2019-07-17 NOTE — NURSING NOTE
Pt her for incomplete bladder emptying.  pvr today is 0... In her chair.  Pt just voided 30 min ago... No UA at this time.  DEVEN Faustin CMA

## 2019-07-17 NOTE — LETTER
7/17/2019       RE: Amanda Richardson  [address] [facility] [address]     Dear Colleague,    Thank you for referring your patient, Amanda Richardson, to the [facility] [place] at [facility]. Please see a copy of my visit note below.    July 17, 2019      CC: Urinary retention    HPI:  Amanda Richardson is a 54 year old female who presents in HFU of the above. She had a prolonged hospitalization earlier this year for urosepsis. She was found to have an obstructing ureteral stone. She underwent stent placement and eventual left ureteroscopy. She struggled with urinary retention and is here for recheck of a PVR. She has MS and is in a wheelchair.     Back to her baseline. Has incontinence.     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 Cystoscopy 2. LEFT retrograde pyelogram 3. LEFT JJ stent placement 4. <1hr physician fluoroscopy time;  Surgeon: Epifanio Sapp MD;  Location: [facility] OR     COMBINED CYSTOSCOPY, RETROGRADES, URETEROSCOPY, LASER HOLMIUM LITHOTRIPSY URETER(S), INSERT STENT Left 3/15/2019    Procedure: Cystoscopy, left ureteral stent exchange, left retrograde pyelogram, interpretation of fluoroscopic images, left ureteroscopy with holmium lithotripsy and stone basketing, 22 modifier for difficult lengthy case.;  Surgeon: Mayito Chauhan MD;  Location: [facility] OR     CYSTOSCOPY       CYSTOSCOPY, REMOVE STENT(S), COMBINED Left 3/27/2019    Procedure: Flexible cystoscopy with left ureteral stent removal;  Surgeon: Mayito Chauhan MD;  Location: [facility] OR     FUSION LUMBAR ANTERIOR, FUSION LUMBAR POSTERIOR TWO LEVELS, COMBINED  10/17/2013    lumbar fusion - Dr Floyd     INSERT PUMP BACLOFEN  04/2017    intrathecal baclofen pump implantation     IRRIGATION AND DEBRIDEMENT LOWER EXTREMITY, COMBINED Right 2015    Right ankle I&D d/t infection     OPEN REDUCTION INTERNAL FIXATION ANKLE  5/15    Right Bimalleolar ankle fx ORIF     OPEN REDUCTION INTERNAL FIXATION ANKLE Right 11/2015    Revision due to spasms pulling screws out of ankle     SINUS SURGERY  2011    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     XR LUMBAR EPIDURAL INJECTION INCL IMAGING  3/14    Left L4-5 Epidural Dr Winter       Social History     Socioeconomic History     Marital status:      Spouse  name: Fernando     Number of children: 3     Years of education: 14     Highest education level: Not on file   Occupational History     Employer: UNEMPLOYED   Social Needs     Financial resource strain: Not on file     Food insecurity:     Worry: Not on file     Inability: Not on file     Transportation needs:     Medical: Not on file     Non-medical: Not on file   Tobacco Use     Smoking status: Former Smoker     Packs/day: 0.50     Types: Cigarettes     Last attempt to quit: 2013     Years since quittin.9     Smokeless tobacco: Never Used     Tobacco comment: since age 17   Substance and Sexual Activity     Alcohol use: No     Drug use: No     Sexual activity: Yes     Partners: Male   Lifestyle     Physical activity:     Days per week: Not on file     Minutes per session: Not on file     Stress: Not on file   Relationships     Social connections:     Talks on phone: Not on file     Gets together: Not on file     Attends Scientologist service: Not on file     Active member of club or organization: Not on file     Attends meetings of clubs or organizations: Not on file     Relationship status: Not on file     Intimate partner violence:     Fear of current or ex partner: Not on file     Emotionally abused: Not on file     Physically abused: Not on file     Forced sexual activity: Not on file   Other Topics Concern     Parent/sibling w/ CABG, MI or angioplasty before 65F 55M? No      Service Not Asked     Blood Transfusions Not Asked     Caffeine Concern Yes     Comment: occas     Occupational Exposure Not Asked     Hobby Hazards Not Asked     Sleep Concern Not Asked     Stress Concern Not Asked     Weight Concern Not Asked     Special Diet Not Asked     Back Care Not Asked     Exercise Yes     Comment: as able     Bike Helmet Not Asked     Seat Belt Yes     Self-Exams Not Asked   Social History Narrative     Not on file       Family History   Problem Relation Age of Onset     C.A.D. Father         with CHF   "    Cancer Father         ? unsure type - abdominal      Hypertension Mother      Thyroid Disease Mother         goiter      Breast Cancer Sister 58     Thyroid Disease Son      Cancer - colorectal No family hx of        ROS:14 point ROS neg other than the symptoms noted above in the HPI.    Allergies   Allergen Reactions     Lisinopril      Lip swelling     Cyclobenzaprine Other (See Comments)     Per 4-10-17 H&P by Yanna Dugan PA-C.     Flexeril [Cyclobenzaprine Hcl]      Got confused        Current Outpatient Medications   Medication     acetaminophen (TYLENOL) 325 MG tablet     amitriptyline (ELAVIL) 100 MG tablet     atorvastatin (LIPITOR) 10 MG tablet     baclofen (LIORESAL) 10 MG tablet     baclofen (LIORESAL) intraTHECAL Internal Pump     gabapentin (NEURONTIN) 300 MG capsule     hydrochlorothiazide (MICROZIDE) 12.5 MG capsule     losartan (COZAAR) 50 MG tablet     losartan-hydrochlorothiazide (HYZAAR) 100-25 MG tablet     metFORMIN (GLUCOPHAGE) 500 MG tablet     metoprolol succinate (TOPROL-XL) 50 MG 24 hr tablet     Multiple Vitamin (MULTI-VITAMIN PO)     omega-3 fatty acids (FISH OIL) 1200 MG capsule     order for DME     oxyCODONE (ROXICODONE) 5 MG tablet     oxyCODONE IR (ROXICODONE) 15 MG tablet     senna-docusate (SENOKOT-S/PERICOLACE) 8.6-50 MG tablet     gabapentin (NEURONTIN) 600 MG tablet     No current facility-administered medications for this visit.          PEx:   Pulse 74, height 1.702 m (5' 7\"), weight 114.3 kg (252 lb), last menstrual period 12/11/2011, SpO2 97 %, not currently breastfeeding.  5' 7\", Body mass index is 39.47 kg/m ., 252 lbs 0 oz  Gen appearance:  Well groomed,: age-appropriate appearing female in NAD.   HEENT:  EOMI, AT NC, CN grossly normal  Psych:  alert , comfortable in no acute distress  Neuro:  A/O X 3  Skin:  Warm to touch, clear of rashes, ecchymoses  Resp:  No increased respiratory effort  lymph:  No LE edema  Abd:  Soft/NT, ND, no palpable masses, no CVAT  Back: " bony spine is non-tender, flanks are nontender    Bladder scan: 0cc      A/P: Amanda Richardson is a 54 year old female with hx of kidney stones and urinary retention  -Bladder scan normal today.  -Discussed obtaining baseline UDS given her MS. She will think about this.  -PVR annually and RTO PRN.    Lori Sen PA-C  [facility]    20 minutes were spent with the patient today, > 50% in counseling and coordination of care      Again, thank you for allowing me to participate in the care of your patient.      Sincerely,    Lori Sen PA-C, PA-C

## 2019-07-18 ENCOUNTER — TELEPHONE (OUTPATIENT)
Dept: WOUND CARE | Facility: CLINIC | Age: 54
End: 2019-07-18

## 2019-07-18 NOTE — TELEPHONE ENCOUNTER
Cordelia Hurt RN with [facility] called asking if we sent in a script for vinegar soaks. We faxed order with instructions to home care agency. I read off the instructions. She will mix and use.

## 2019-07-19 ENCOUNTER — TELEPHONE (OUTPATIENT)
Dept: FAMILY MEDICINE | Facility: CLINIC | Age: 54
End: 2019-07-19

## 2019-07-19 NOTE — TELEPHONE ENCOUNTER
Date Forms was received: July 19, 2019    Forms received by: Fax    Last office visit: 6/21/19    Purpose of Form:  [facility] - wound care plan of care    How the form needs to be returned for patient:  Fax    Form currently placed  North File

## 2019-07-19 NOTE — TELEPHONE ENCOUNTER
Diana with [facility] is seeing Pt for Lymphedema treatment and wound care. Diana would like verbal orders to see Pt for today x1 and next week x3. Verbal orders given, repeated back for verification.     Written order will be faxed over for provider approval.    Anival Goodwin RN   [facility]

## 2019-07-19 NOTE — TELEPHONE ENCOUNTER
Completed forms faxed back to [facility] at [phone].   Originals sent to be scanned.     Katharina Goodwin

## 2019-07-22 ENCOUNTER — MEDICAL CORRESPONDENCE (OUTPATIENT)
Dept: HEALTH INFORMATION MANAGEMENT | Facility: CLINIC | Age: 54
End: 2019-07-22

## 2019-07-22 ENCOUNTER — TELEPHONE (OUTPATIENT)
Dept: FAMILY MEDICINE | Facility: CLINIC | Age: 54
End: 2019-07-22

## 2019-07-22 NOTE — TELEPHONE ENCOUNTER
Date Forms was received: July 22, 2019    Forms received by: Fax    When the form is due:  OT    How the form needs to be returned for patient:  Fax    Form currently placed  North File

## 2019-07-22 NOTE — TELEPHONE ENCOUNTER
Completed forms faxed back to [facility] at [phone].   Originals sent to be scanned.     Katharina Goodwin

## 2019-07-23 ENCOUNTER — TELEPHONE (OUTPATIENT)
Dept: FAMILY MEDICINE | Facility: CLINIC | Age: 54
End: 2019-07-23

## 2019-07-23 DIAGNOSIS — G35 MS (MULTIPLE SCLEROSIS) (H): ICD-10-CM

## 2019-07-23 NOTE — TELEPHONE ENCOUNTER
Reason for call:  Form   Our goal is to have forms completed within 72 hours, however some forms may require a visit or additional information.     Who is the form from? [facility] (if other please explain)  Where did the form come from? form was faxed in  What clinic location was the form placed at? [facility]  Where was the form placed? Given to FREDO Cancino  What number is listed as a contact on the form? [phone]    Phone call message - patient request for a letter, form or note:

## 2019-07-24 ENCOUNTER — MEDICAL CORRESPONDENCE (OUTPATIENT)
Dept: HEALTH INFORMATION MANAGEMENT | Facility: CLINIC | Age: 54
End: 2019-07-24

## 2019-07-24 ENCOUNTER — TELEPHONE (OUTPATIENT)
Dept: FAMILY MEDICINE | Facility: CLINIC | Age: 54
End: 2019-07-24

## 2019-07-24 NOTE — TELEPHONE ENCOUNTER
MED RECONCILIATION DONE    Discrepancies:   -Atrovastatin 10 mg (noted bedtime on home care form)    - Hydrochlorothiazide 12.5 mg tabs   (Form states 12.5 mg PO one tab daily)  (Epic states 25 mg PO BID)    -Losartan 50 mg tabs  (Form states take 50mg PO every day)  (Epic states take 2 tablets (100 mg) PO every day)    -Metformin  mg tab  (Form states take 1000 mg daily with a meal)  (Epic states take 500 mg PO every day with dinner)    - Senna-Docusate 8.6-50 mg tab  (Form states take once a day)  (Epic states take 2 tabs PO every day)        Medications on Epic but not Form:   -  baclofen (LIORESAL) intraTHECAL Internal Pump     --   Sig - Route: by Intrathecal route continuous prn 4/11- Managed by [facility], spoke to Jolene at [phone]   Pump contains baclofen 2000mcg/ml   Alarm date: 6/18/19   Dose-434.8 mcg/day   Reservoir contains 2ml after the alarm date. - Intrathecal   Class: Historical   Order: [id number]     gabapentin (NEURONTIN) 600 MG tablet 270 tablet 3 6/26/2019  No   Sig: TAKE 1 TABLET BY MOUTH THREE TIMES DAILY AND  MAY  TAKE  ONE  ADDITIONAL  AS  NEEDED   Patient not taking: Reported on 7/17/2019        Sent to pharmacy as: gabapentin (NEURONTIN) 600 MG tablet   Class: E-Prescribe   Notes to Pharmacy: Please consider 90 day supplies to promote better adherence   Order: [id number]     losartan-hydrochlorothiazide (HYZAAR) 100-25 MG tablet   1/22/2019  --   Class: Historical   Order: [id number]     oxyCODONE (ROXICODONE) 5 MG tablet 30 tablet 0 7/1/2019  Yes   Sig: Take 3 tablets (15 mg) every 4-6 hours as needed for severe pain   Class: Local Print   Earliest Fill Date: 7/1/2019   Order: [id number]     oxyCODONE IR (ROXICODONE) 15 MG tablet 120 tablet 0 7/2/2019  No   Sig - Route: Take 1 tablet (15 mg) by mouth every 6 hours as needed for moderate to severe pain Limit 4 tablet(s) per day. - Oral   Class: Local Print   Earliest Fill Date: 7/2/2019   Order: [id number]            Medications on Form but not Epic:   -Cymbalta oral capsule delayed release particles 60 mg; take 60 mg PO BID with food    - Dantrolene Sodium oral capsule 25 mg; take 25 mg PO BID every day    - Fluticasone Propionate Nasal Suspension 50 mcg/ACT; Once a day spray 1 spray in each nostril QAM    - Potassium chloride ER oral tab 20 mEq; take 20 mEq PO every day        Form currently on [facility] in [place] RN Folder    Routing to PCP for further review/recommendations/orders.    Anival Goodwin RN  [facility]

## 2019-07-24 NOTE — TELEPHONE ENCOUNTER
Completed forms faxed back to [facility] at [phone].   Originals sent to be scanned.     Katharina Goodwin

## 2019-07-24 NOTE — TELEPHONE ENCOUNTER
Date Forms was received: July 24, 2019    Forms received by: Fax    Purpose of Form:  PT/OT Orders - signed by MD    How the form needs to be returned for patient:  Fax

## 2019-07-26 ENCOUNTER — TELEPHONE (OUTPATIENT)
Dept: FAMILY MEDICINE | Facility: CLINIC | Age: 54
End: 2019-07-26

## 2019-07-26 ENCOUNTER — MEDICAL CORRESPONDENCE (OUTPATIENT)
Dept: HEALTH INFORMATION MANAGEMENT | Facility: CLINIC | Age: 54
End: 2019-07-26

## 2019-07-26 ENCOUNTER — TELEPHONE (OUTPATIENT)
Dept: WOUND CARE | Facility: CLINIC | Age: 54
End: 2019-07-26

## 2019-07-26 DIAGNOSIS — Z53.9 DIAGNOSIS NOT YET DEFINED: Primary | ICD-10-CM

## 2019-07-26 PROCEDURE — G0180 MD CERTIFICATION HHA PATIENT: HCPCS | Performed by: FAMILY MEDICINE

## 2019-07-26 RX ORDER — AMOXICILLIN 250 MG
2 CAPSULE ORAL DAILY PRN
Qty: 180 TABLET | Refills: 0
Start: 2019-07-26

## 2019-07-26 NOTE — TELEPHONE ENCOUNTER
Completed forms faxed back to [facility] at [phone].   Originals sent to be scanned.     Katharina Goodwin

## 2019-07-26 NOTE — TELEPHONE ENCOUNTER
Date Forms was received: July 26, 2019    Forms received by: Fax    Purpose of Form:  Wound care    How the form needs to be returned for patient:  Fax    Form currently placed  North File

## 2019-07-26 NOTE — TELEPHONE ENCOUNTER
Reason for Call:  JERSEY PT from [facility]  called regarding BLE wounds and requesting compression. Right lower leg wound will not heal with current level of compression not really working to reduce edema or texture on leg but is reducing the drainage. Left lower leg has open wounds and an increasing number of them since the last visit.      Recommending  Compriflex light compression bilaterally if we can document the presence of all wounds on BLEs. They will be 80% covered by insurance. She has already measured her and is just waiting for our evaluation on this Tuesday and documentation to be faxed to her office @ [phone].     Pt Provider: Liya Robles PA-C     Phone Number can be reached at: [phone]     Can we leave a detailed message on this number? YES     Mery García RN on 7/26/2019 at 2:18 PM      Routed to  Wound Healing Nurse Pool

## 2019-07-26 NOTE — TELEPHONE ENCOUNTER
Called [phone] [facility] completed:    Discrepancies:   -Atrovastatin 10 mg (noted bedtime on home care form) - patient stated she is taking in the am     - Hydrochlorothiazide 12.5 mg tabs - discontinued  (Form states 12.5 mg PO one tab daily)  (Epic states 25 mg PO BID)     -Losartan 50 mg tabs -discontinued  (Form states take 50mg PO every day)  (Epic states take 2 tablets (100 mg) PO every day)     PATIENT IS NOW TAKING LOSARTAN-hydrochlorothiazide 100-25 - 1 TAB po DAILY    -Metformin  mg tab - patient taking 1 tab (500mg) @ dinner    (Epic states take 500 mg PO every day with dinner)     - Senna-Docusate 8.6-50 mg tab - patient is only taking PRN, when she does take it, she takes 2 tabs PO daily    (Epic states take 2 tabs PO every day)           Medications on Epic but not Form:   -  baclofen (LIORESAL) intraTHECAL Internal Pump         --   Sig - Route: by Intrathecal route continuous prn 4/11- Managed by [facility], spoke to Jolene at [phone]   Pump contains baclofen 2000mcg/ml   Alarm date: 6/18/19   Dose-434.8 mcg/day   Reservoir contains 2ml after the alarm date. - Intrathecal   Class: Historical   Order: [id number]    TAKING      gabapentin (NEURONTIN) 600 MG tablet 270 tablet 3 6/26/2019   No   Sig: TAKE 1 TABLET BY MOUTH THREE TIMES DAILY AND  MAY  TAKE  ONE  ADDITIONAL  AS  NEEDED   Patient not taking: Reported on 7/17/2019             Sent to pharmacy as: gabapentin (NEURONTIN) 600 MG tablet   Class: E-Prescribe   Notes to Pharmacy: Please consider 90 day supplies to promote better adherence   Order: [id number]   TAKING      losartan-hydrochlorothiazide (HYZAAR) 100-25 MG tablet     1/22/2019   --   Class: Historical     Order: [id number]      TAKING - SEE ABOVE       30 tablet 0 7/1/2019   Yes   Sig: Take 3 tablets (15 mg) every 4-6 hours as needed for severe pain   Class: Local Print   Earliest Fill Date: 7/1/2019   Order: [id number]    NOT TAKING      oxyCODONE  IR (ROXICODONE) 15 MG tablet 120 tablet 0 7/2/2019   No   Sig - Route: Take 1 tablet (15 mg) by mouth every 6 hours as needed for moderate to severe pain Limit 4 tablet(s) per day. - Oral   Class: Local Print   Earliest Fill Date: 7/2/2019   Order: [id number]    TAKING           Medications on Form but not Epic:                          Med List updated  Printed and placed with form     Routing to PCP for further review/recommendations/orders.    Ivanna Kelly RN  [facility]

## 2019-07-26 NOTE — TELEPHONE ENCOUNTER
Completed forms faxed back to [facility] at [phone].   Originals sent to be scanned.     Katharina Goodwin

## 2019-07-29 ENCOUNTER — TELEPHONE (OUTPATIENT)
Dept: FAMILY MEDICINE | Facility: CLINIC | Age: 54
End: 2019-07-29

## 2019-07-29 ENCOUNTER — OFFICE VISIT (OUTPATIENT)
Dept: FAMILY MEDICINE | Facility: CLINIC | Age: 54
End: 2019-07-29
Payer: COMMERCIAL

## 2019-07-29 ENCOUNTER — MEDICAL CORRESPONDENCE (OUTPATIENT)
Dept: HEALTH INFORMATION MANAGEMENT | Facility: CLINIC | Age: 54
End: 2019-07-29

## 2019-07-29 VITALS
SYSTOLIC BLOOD PRESSURE: 118 MMHG | TEMPERATURE: 98.3 F | BODY MASS INDEX: 39.55 KG/M2 | HEIGHT: 67 IN | DIASTOLIC BLOOD PRESSURE: 62 MMHG | WEIGHT: 252 LBS | HEART RATE: 74 BPM | OXYGEN SATURATION: 95 %

## 2019-07-29 DIAGNOSIS — I89.0 LYMPHEDEMA: ICD-10-CM

## 2019-07-29 DIAGNOSIS — M79.605 CHRONIC PAIN OF BOTH LOWER EXTREMITIES: ICD-10-CM

## 2019-07-29 DIAGNOSIS — Z51.81 MEDICATION MONITORING ENCOUNTER: ICD-10-CM

## 2019-07-29 DIAGNOSIS — G89.4 CHRONIC PAIN SYNDROME: ICD-10-CM

## 2019-07-29 DIAGNOSIS — Z87.81 S/P RIGHT HIP FRACTURE: ICD-10-CM

## 2019-07-29 DIAGNOSIS — E66.01 MORBID OBESITY (H): ICD-10-CM

## 2019-07-29 DIAGNOSIS — E78.5 HYPERLIPIDEMIA LDL GOAL <70: ICD-10-CM

## 2019-07-29 DIAGNOSIS — N18.1 TYPE 2 DIABETES MELLITUS WITH STAGE 1 CHRONIC KIDNEY DISEASE, WITHOUT LONG-TERM CURRENT USE OF INSULIN (H): ICD-10-CM

## 2019-07-29 DIAGNOSIS — G89.29 CHRONIC PAIN OF BOTH LOWER EXTREMITIES: ICD-10-CM

## 2019-07-29 DIAGNOSIS — F32.A MODERATE DEPRESSIVE EPISODE: ICD-10-CM

## 2019-07-29 DIAGNOSIS — C85.90 T-CELL LYMPHOMA (H): ICD-10-CM

## 2019-07-29 DIAGNOSIS — L89.159 PRESSURE INJURY OF SKIN OF SACRAL REGION, UNSPECIFIED INJURY STAGE: ICD-10-CM

## 2019-07-29 DIAGNOSIS — Z02.89 PAIN MEDICATION AGREEMENT: ICD-10-CM

## 2019-07-29 DIAGNOSIS — M79.604 CHRONIC PAIN OF BOTH LOWER EXTREMITIES: ICD-10-CM

## 2019-07-29 DIAGNOSIS — Z23 NEED FOR SHINGLES VACCINE: ICD-10-CM

## 2019-07-29 DIAGNOSIS — M48.061 SPINAL STENOSIS OF LUMBAR REGION, UNSPECIFIED WHETHER NEUROGENIC CLAUDICATION PRESENT: ICD-10-CM

## 2019-07-29 DIAGNOSIS — M54.16 LUMBAR RADICULOPATHY: ICD-10-CM

## 2019-07-29 DIAGNOSIS — N18.1 CKD (CHRONIC KIDNEY DISEASE) STAGE 1, GFR 90 ML/MIN OR GREATER: ICD-10-CM

## 2019-07-29 DIAGNOSIS — G35 MS (MULTIPLE SCLEROSIS) (H): Primary | ICD-10-CM

## 2019-07-29 DIAGNOSIS — L89.309 PRESSURE INJURY OF SKIN OF BUTTOCK, UNSPECIFIED INJURY STAGE, UNSPECIFIED LATERALITY: ICD-10-CM

## 2019-07-29 DIAGNOSIS — E11.22 TYPE 2 DIABETES MELLITUS WITH STAGE 1 CHRONIC KIDNEY DISEASE, WITHOUT LONG-TERM CURRENT USE OF INSULIN (H): ICD-10-CM

## 2019-07-29 DIAGNOSIS — I10 HYPERTENSION GOAL BP (BLOOD PRESSURE) < 140/90: ICD-10-CM

## 2019-07-29 PROCEDURE — 90471 IMMUNIZATION ADMIN: CPT | Performed by: FAMILY MEDICINE

## 2019-07-29 PROCEDURE — 99214 OFFICE O/P EST MOD 30 MIN: CPT | Mod: 25 | Performed by: FAMILY MEDICINE

## 2019-07-29 PROCEDURE — 90750 HZV VACC RECOMBINANT IM: CPT | Performed by: FAMILY MEDICINE

## 2019-07-29 RX ORDER — OXYCODONE HYDROCHLORIDE 15 MG/1
15 TABLET ORAL EVERY 6 HOURS PRN
Qty: 120 TABLET | Refills: 0 | Status: SHIPPED | OUTPATIENT
Start: 2019-08-01 | End: 2019-08-28

## 2019-07-29 ASSESSMENT — MIFFLIN-ST. JEOR: SCORE: 1765.69

## 2019-07-29 NOTE — PROGRESS NOTES
SUBJECTIVE:                                                    Amanda Richardson is a 54 year old female who presents to clinic today for the following health issues:    Multiple Sclerosis: Stable. Patient consults with neurology.     Diabetes: Patient reports blood glucose range of  mg/dL with a fasting morning average of 95 mg/dL. Patient's Diabetes is well controlled. Patient is currently prescribed 500 mg Metformin daily for DM management.     Glucose   Date Value Ref Range Status   06/21/2019 94 70 - 99 mg/dL Final   04/16/2019 115 (H) 70 - 99 mg/dL Final   04/15/2019 115 (H) 70 - 99 mg/dL Final   04/14/2019 109 (H) 70 - 99 mg/dL Final   04/13/2019 106 (H) 70 - 99 mg/dL Final     Lab Results   Component Value Date    A1C 6.3 03/17/2019    A1C 6.6 02/09/2019    A1C 6.4 01/30/2019    A1C 6.8 06/20/2018    A1C 6.5 10/02/2017     Hyperlipidemia: Patient's hyperlipidemia is uncontrolled. Patient is currently prescribed 10 mg Atorvastatin daily for hyperlipidemia management.     Recent Labs   Lab Test 03/18/19  0625 02/02/19  0455 06/20/18  1529 05/02/16  1613 12/08/14  1756 11/13/12  0957   CHOL  --   --  141 169 172 205*   HDL  --   --  33* 35* 36* 32*   LDL  --   --  82 107* 97 140*   TRIG 218* 292* 130 135 197* 167*   CHOLHDLRATIO  --   --   --   --  4.8 6.5*     Hypertension: Patient presents today as normotensive. Patient is currently prescribed 25 mg hydrochlorothiazide daily, 100 mg Losartan daily, and 50 mg Metoprolol Succinate daily for hypertension management.     BP Readings from Last 5 Encounters:   07/29/19 118/62   07/09/19 153/84   06/21/19 130/66   04/17/19 114/70   04/10/19 136/72     Creatinine   Date Value Ref Range Status   06/21/2019 0.50 (L) 0.52 - 1.04 mg/dL Final     Non-Hodgkin's T-cell Lymphoma: Stable. No issues.      Depression/Anxiety: Patient's depression/anxiety is well controlled. Patient is not currently prescribed any medication for depression/anxiety  management.    Urosepsis: Stable. No current issues. Patient had scanned performed that showed that patient was not retaining urine.    Low back pain: Stable.     Wounds: Patient reports her pressure wound on her buttocks is healing well and so are her legs. Patient reports she has a follow up with wound care scheduled for 7/30/2019.      Reviewed and updated as needed this visit by Provider       BP Readings from Last 3 Encounters:   07/29/19 118/62   07/09/19 153/84   06/21/19 130/66       body mass index is 39.47 kg/m .    Wt Readings from Last 4 Encounters:   07/29/19 114.3 kg (252 lb)   07/17/19 114.3 kg (252 lb)   06/21/19 114.3 kg (252 lb)   04/14/19 114.4 kg (252 lb 3.2 oz)       Health Maintenance    Health Maintenance Due   Topic Date Due     DIABETIC FOOT EXAM  1965     EYE EXAM  1965     FIT  05/08/1973     ZOSTER IMMUNIZATION (1 of 2) 05/08/2013     MEDICARE ANNUAL WELLNESS VISIT  05/02/2017     DEXA  06/26/2017     PAP  05/02/2019     LIPID  06/20/2019     MICROALBUMIN  06/20/2019     URINE DRUG SCREEN  06/20/2019     MAMMO SCREENING  07/09/2019       Current Problem List    Patient Active Problem List   Diagnosis     MS (multiple sclerosis) (H)     Non Hodgkin's lymphoma (H)     Leukocytosis     Gallstone     Hypertension goal BP (blood pressure) < 140/90     Spinal stenosis, lumbar     Abnormality of gait     Pain medication agreement- signed Nov 20,2012     Lumbar radiculopathy     Colon polyps     Neuralgia, neuritis, and radiculitis, unspecified     Encounter for long-term current use of medication     Health Care Home     Moderate depressive episode (H)     Leg muscle spasm     Chronic pain syndrome     Controlled substance agreement signed     T-cell lymphoma (H)     Type 2 diabetes, HbA1c goal < 7% (H)     Hyperlipidemia LDL goal <70     CKD (chronic kidney disease) stage 1, GFR 90 ml/min or greater     Type 2 diabetes mellitus with stage 1 chronic kidney disease, without long-term  current use of insulin (H)     Morbid obesity (H)     Acute hypercapnic respiratory failure (H)     Sepsis (H)     S/P right hip fracture       Past Medical History    Past Medical History:   Diagnosis Date     Abnormality of gait 7/27/2012     Arrhythmia     with sepsis     Chronic pain     [facility] - yearly, next in summer 2018     CKD (chronic kidney disease) stage 1, GFR 90 ml/min or greater     kidney stones     Colon polyps 1/15    tubular adenomas x 2     Gallstone 6/11/2012     Hyperlipidemia LDL goal <70      Hypertension goal BP (blood pressure) < 140/90      Leukocytosis 6/11/2012     Moderate depressive episode (H)      MS (multiple sclerosis) (H) 2003    Dr Vigil/Gaby - [facility]     Multiple sclerosis (H)      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 Cystoscopy 2. LEFT retrograde pyelogram 3. LEFT JJ stent placement 4. <1hr physician fluoroscopy time;  Surgeon: Epifanio Sapp  MD GERALD;  Location: [facility] OR     COMBINED CYSTOSCOPY, RETROGRADES, URETEROSCOPY, LASER HOLMIUM LITHOTRIPSY URETER(S), INSERT STENT Left 3/15/2019    Procedure: Cystoscopy, left ureteral stent exchange, left retrograde pyelogram, interpretation of fluoroscopic images, left ureteroscopy with holmium lithotripsy and stone basketing, 22 modifier for difficult lengthy case.;  Surgeon: Mayito Chauhan MD;  Location: [facility] OR     CYSTOSCOPY       CYSTOSCOPY, REMOVE STENT(S), COMBINED Left 3/27/2019    Procedure: Flexible cystoscopy with left ureteral stent removal;  Surgeon: Mayito Chauhan MD;  Location: [facility] OR     FUSION LUMBAR ANTERIOR, FUSION LUMBAR POSTERIOR TWO LEVELS, COMBINED  10/17/2013    lumbar fusion - Dr Floyd     INSERT PUMP BACLOFEN  04/2017    intrathecal baclofen pump implantation     IRRIGATION AND DEBRIDEMENT LOWER EXTREMITY, COMBINED Right 2015    Right ankle I&D d/t infection     OPEN REDUCTION INTERNAL FIXATION ANKLE  5/15    Right Bimalleolar ankle fx ORIF     OPEN REDUCTION INTERNAL FIXATION ANKLE Right 11/2015    Revision due to spasms pulling screws out of ankle     SINUS SURGERY  2011    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     XR LUMBAR EPIDURAL INJECTION INCL IMAGING  3/14    Left L4-5 Epidural Dr Winter       Current Medications    Current Outpatient Medications   Medication Sig Dispense Refill     acetaminophen (TYLENOL) 325 MG tablet Take 2 tablets (650 mg) by mouth every 4 hours as needed for mild pain       amitriptyline (ELAVIL) 100 MG tablet Take 1 tablet (100 mg) by mouth At Bedtime 90 tablet 3     atorvastatin (LIPITOR) 10 MG tablet Take 1 tablet (10 mg) by mouth daily 90 tablet 3     baclofen (LIORESAL) 10 MG tablet Take 10 mg by mouth 3 times daily       baclofen (LIORESAL) intraTHECAL Internal Pump by Intrathecal route continuous prn 4/11- Managed by [facility], spoke to Jolene at [phone]  Pump contains baclofen 2000mcg/ml  Alarm  date: 6/18/19  Dose-434.8 mcg/day  Reservoir contains 2ml after the alarm date.       gabapentin (NEURONTIN) 300 MG capsule Take 600 mg by mouth 3 times daily       losartan-hydrochlorothiazide (HYZAAR) 100-25 MG tablet        metFORMIN (GLUCOPHAGE) 500 MG tablet Take 1 tablet (500 mg) by mouth daily (with dinner) 90 tablet 3     metoprolol succinate (TOPROL-XL) 50 MG 24 hr tablet TAKE 1 TABLET BY MOUTH ONCE DAILY 90 tablet 1     Multiple Vitamin (MULTI-VITAMIN PO) Take 1 tablet by mouth daily        naloxone (NARCAN) 4 MG/0.1ML nasal spray Spray 1 spray (4 mg) into one nostril alternating nostrils once as needed for opioid reversal Every 2-3 minutes until patient responsive or EMS arrives 0.2 mL 0     omega-3 fatty acids (FISH OIL) 1200 MG capsule Take 1 capsule by mouth daily.       order for DME [facility] Still to evaluate wheelchair and seating situations at home to optimize off-loading. 1 Units 0     [START ON 8/1/2019] oxyCODONE IR (ROXICODONE) 15 MG tablet Take 1 tablet (15 mg) by mouth every 6 hours as needed for moderate to severe pain Limit 4 tablet(s) per day. 120 tablet 0     senna-docusate (SENOKOT-S/PERICOLACE) 8.6-50 MG tablet Take 2 tablets by mouth daily as needed for constipation 180 tablet 0       Allergies    Allergies   Allergen Reactions     Lisinopril      Lip swelling     Cyclobenzaprine Other (See Comments)     Per 4-10-17 H&P by Yanna Dugan PA-C.     Flexeril [Cyclobenzaprine Hcl]      Got confused        Immunizations    Immunization History   Administered Date(s) Administered     Influenza (H1N1) 12/10/2009     Influenza (IIV3) PF 12/10/2009, 11/06/2012, 12/08/2014     Influenza Vaccine IM 3yrs+ 4 Valent IIV4 10/21/2013, 09/19/2018     Influenza Vaccine, 3 YRS +, IM (QUADRIVALENT W/PRESERVATIVES) 09/28/2017     Pneumococcal 23 valent 01/05/2011     TDAP Vaccine (Adacel) 09/19/2018     Tdap (Adacel,Boostrix) 09/17/2008     Zoster vaccine recombinant adjuvanted (SHINGRIX) 07/29/2019        Family History    Family History   Problem Relation Age of Onset     C.A.D. Father         with CHF      Cancer Father         ? unsure type - abdominal      Hypertension Mother      Thyroid Disease Mother         goiter      Breast Cancer Sister 58     Thyroid Disease Son      Cancer - colorectal No family hx of        Social History    Social History     Socioeconomic History     Marital status:      Spouse name: Fernando     Number of children: 3     Years of education: 14     Highest education level: Not on file   Occupational History     Employer: UNEMPLOYED   Social Needs     Financial resource strain: Not on file     Food insecurity:     Worry: Not on file     Inability: Not on file     Transportation needs:     Medical: Not on file     Non-medical: Not on file   Tobacco Use     Smoking status: Former Smoker     Packs/day: 0.50     Types: Cigarettes     Last attempt to quit: 2013     Years since quittin.9     Smokeless tobacco: Never Used     Tobacco comment: since age 17   Substance and Sexual Activity     Alcohol use: No     Drug use: No     Sexual activity: Yes     Partners: Male   Lifestyle     Physical activity:     Days per week: Not on file     Minutes per session: Not on file     Stress: Not on file   Relationships     Social connections:     Talks on phone: Not on file     Gets together: Not on file     Attends [employer] service: Not on file     Active member of club or organization: Not on file     Attends meetings of clubs or organizations: Not on file     Relationship status: Not on file     Intimate partner violence:     Fear of current or ex partner: Not on file     Emotionally abused: Not on file     Physically abused: Not on file     Forced sexual activity: Not on file   Other Topics Concern     Parent/sibling w/ CABG, MI or angioplasty before 65F 55M? No      Service Not Asked     Blood Transfusions Not Asked     Caffeine Concern Yes     Comment: occas      "Occupational Exposure Not Asked     Hobby Hazards Not Asked     Sleep Concern Not Asked     Stress Concern Not Asked     Weight Concern Not Asked     Special Diet Not Asked     Back Care Not Asked     Exercise Yes     Comment: as able     Bike Helmet Not Asked     Seat Belt Yes     Self-Exams Not Asked   Social History Narrative     Not on file       All above reviewed and updated, all stable unless otherwise noted    Recent labs reviewed    ROS:  Constitutional, HEENT, cardiovascular, pulmonary, GI, , musculoskeletal, neuro, skin, endocrine and psych systems are negative, except as otherwise noted.    OBJECTIVE:                                                    /62   Pulse 74   Temp 98.3  F (36.8  C) (Oral)   Ht 1.702 m (5' 7\")   Wt 114.3 kg (252 lb)   LMP 12/11/2011   SpO2 95%   BMI 39.47 kg/m    Body mass index is 39.47 kg/m .  GENERAL: healthy, alert and no distress  EYES: Eyes grossly normal to inspection  HENT:ear canals and TM's normal upon viewing with otoscope, nose and mouth without ulcers or lesions upon viewing with otoscope  NECK: no tenderness, no adenopathy, no asymmetry, no masses, no stiffness; thyroid- normal to palpation  RESP: lungs clear to auscultation - no rales, no rhonchi, no wheezes  CV: regular rates and rhythm, normal S1 S2, no S3 or S4 and no murmur, no click or rub -  ABDOMEN: soft, no tenderness, no  hepatosplenomegaly, no masses, normal bowel sounds  MS: extremities- no gross deformities noted, no edema  SKIN: no suspicious lesions, no rashes  NEURO: strength and tone- normal, sensory exam- grossly normal, mentation- intact, speech- normal, reflexes- symmetric  BACK: no CVA tenderness, no paralumbar tenderness  PSYCH: Alert and oriented times 3; speech- coherent , normal rate and volume; able to articulate logical thoughts, able to abstract reason, no tangential thoughts, no hallucinations or delusions, affect- normal    DIAGNOSTICS/PROCEDURES:                          "                             No results found for this or any previous visit (from the past 24 hour(s)).     ASSESSMENT:                                                        ICD-10-CM    1. MS (multiple sclerosis) (H) G35 oxyCODONE IR (ROXICODONE) 15 MG tablet     Comprehensive metabolic panel     CBC with platelets   2. Pressure injury of skin of sacral region, unspecified injury stage L89.159 CBC with platelets     Erythrocyte sedimentation rate auto     CRP inflammation   3. Pressure injury of skin of buttock, unspecified injury stage, unspecified laterality L89.309 CBC with platelets     Erythrocyte sedimentation rate auto     CRP inflammation   4. Lymphedema I89.0 Comprehensive metabolic panel   5. S/P right hip fracture Z87.81    6. Spinal stenosis of lumbar region, unspecified whether neurogenic claudication present M48.061    7. Lumbar radiculopathy M54.16    8. Chronic pain of both lower extremities M79.604     M79.605     G89.29    9. Chronic pain syndrome G89.4 naloxone (NARCAN) 4 MG/0.1ML nasal spray     oxyCODONE IR (ROXICODONE) 15 MG tablet   10. Type 2 diabetes mellitus with stage 1 chronic kidney disease, without long-term current use of insulin (H) E11.22 Comprehensive metabolic panel    N18.1 Lipid panel reflex to direct LDL Fasting     UA reflex to Microscopic and Culture     Albumin Random Urine Quantitative with Creat Ratio     Hemoglobin A1c   11. CKD (chronic kidney disease) stage 1, GFR 90 ml/min or greater N18.1 Comprehensive metabolic panel     CBC with platelets     UA reflex to Microscopic and Culture     Albumin Random Urine Quantitative with Creat Ratio   12. Hypertension goal BP (blood pressure) < 140/90 I10 Comprehensive metabolic panel     UA reflex to Microscopic and Culture     Albumin Random Urine Quantitative with Creat Ratio   13. Hyperlipidemia LDL goal <70 E78.5 Comprehensive metabolic panel     Lipid panel reflex to direct LDL Fasting     CK total   14. T-cell lymphoma (H)  C85.90 CBC with platelets   15. Moderate depressive episode (H) F32.1 TSH with free T4 reflex   16. Morbid obesity (H) E66.01 TSH with free T4 reflex   17. Medication monitoring encounter Z51.81 Comprehensive metabolic panel     Lipid panel reflex to direct LDL Fasting     CBC with platelets     CK total     TSH with free T4 reflex     UA reflex to Microscopic and Culture     Albumin Random Urine Quantitative with Creat Ratio     Hemoglobin A1c     Erythrocyte sedimentation rate auto     CRP inflammation   18. Pain medication agreement- signed Nov 20,2012 Z02.89    19. Need for shingles vaccine Z23      PLAN:                                                    Discussed treatment/modality options, including risk and benefits she desires:    advised 1 multivitamin per day, advised calcium 0504-4245 mg/d and Vitamin D 800-1200 IU/d, advised dentist every 6 months, advised diet and exercise and advised opthalmologist every 1-2 years.     1) Patient reports blood glucose range of  mg/dL with a fasting morning average of 95 mg/dL. Patient's Diabetes is well controlled. Patient is currently prescribed 500 mg Metformin daily for DM management. Advised continued use.     2) Patient's hyperlipidemia is uncontrolled. Patient is currently prescribed 10 mg Atorvastatin daily for hyperlipidemia management. Advised continued use.     3) Patient presents today as normotensive. Patient is currently prescribed 25 mg hydrochlorothiazide daily, 100 mg Losartan daily, and 50 mg Metoprolol Succinate daily for hypertension management. Advised continued use.     4) Recommend continued follow up with wound care for further wound management.     5) Received Shingrix vaccine today.    6) Follow up for fasting labs.    7) Follow up in 3 months for medication recheck visit.     All diagnosis above reviewed and noted above, otherwise stable.  See Trutap orders for further details.     Return in about 3 months (around 10/29/2019), or if  symptoms worsen or fail to improve, for Medication Recheck Visit, Follow Up Chronic.    Health Maintenance Due   Topic Date Due     DIABETIC FOOT EXAM  1965     EYE EXAM  1965     FIT  05/08/1973     ZOSTER IMMUNIZATION (1 of 2) 05/08/2013     MEDICARE ANNUAL WELLNESS VISIT  05/02/2017     DEXA  06/26/2017     PAP  05/02/2019     LIPID  06/20/2019     MICROALBUMIN  06/20/2019     URINE DRUG SCREEN  06/20/2019     MAMMO SCREENING  07/09/2019     This document serves as a record of the services and decisions personally performed and made by Julius Hassan MD. It was created on his behalf by Gaurav Owen, a trained medical scribe. The creation of this document is based on the provider's statements to the medical scribe.  Gaurav Owen July 29, 2019 3:20 PM     The information in this document, created by the medical scribe for me, accurately reflects the services I personally performed and the decisions made by me. I have reviewed and approved this document for accuracy prior to leaving the patient care area.  July 29, 2019           Julius Hassan MD [address]  [id number] [phone] Fax

## 2019-07-29 NOTE — TELEPHONE ENCOUNTER
Reason for Call:  Form, our goal is to have forms completed with 72 hours, however, some forms may require a visit or additional information.    Type of letter, form or note:  Home Health Certification    Who is the form from?: Home care    Where did the form come from: form was faxed in    What clinic location was the form placed at?: [place]    Where the form was placed: Given to physician    What number is listed as a contact on the form?: [phone], fax [phone]       Additional comments: discharge skilled PT    Call taken on 7/29/2019 at 10:00 AM by Katharina Goodwin

## 2019-07-29 NOTE — TELEPHONE ENCOUNTER
Completed forms faxed back to [facility] at [phone].   Originals sent to be scanned.     Katharina Goodwin

## 2019-07-29 NOTE — PATIENT INSTRUCTIONS
Diabetes Goals:   1) Blood glucose  fasting.  2) Blood glucose less than 180 two hours after eating.    Recommend Shingrix vaccine for shingles. Check with insurance to see where the Shingrix vaccine is the cheapest. Follow up 2-6 months after receiving the first shot for the second shot.    [facility]                        To reach your care team during and after hours:   [phone]  To reach our pharmacy:        [phone]    Clinic Hours                        Our clinic hours are:    Monday   7:30 am to 7:00 pm                  Tuesday through Friday 7:30 am to 5:00 pm                             Saturday   8:00 am to 12:00 pm      Sunday   Closed      Pharmacy Hours                        Our pharmacy hours are:    Monday   8:30 am to 7:00 pm       Tuesday to Friday  8:30 am to 6:00 pm                       Saturday    9:00 am to 1:00 pm              Sunday    Closed              There is also information available at our web site:  [email or web address]    If your provider ordered any lab tests and you do not receive the results within 10 business days, please call the clinic.    If you need a medication refill please contact your pharmacy.  Please allow 2-3 business days for your refill to be completed.    Our clinic offers telephone visits and e visits.  Please ask one of your team members to explain more.      Use [email or web address] (secure email communication and access to your chart) to send your primary care provider a message or make an appointment. Ask someone on your Team how to sign up for fos4Xt.  Immunizations                      Immunization History   Administered Date(s) Administered     Influenza (H1N1) 12/10/2009     Influenza (IIV3) PF 12/10/2009, 11/06/2012, 12/08/2014     Influenza Vaccine IM 3yrs+ 4 Valent IIV4 10/21/2013, 09/19/2018     Influenza Vaccine, 3 YRS +, IM (QUADRIVALENT W/PRESERVATIVES) 09/28/2017     Pneumococcal 23 valent 01/05/2011     TDAP Vaccine (Adacel)  09/19/2018     Tdap (Adacel,Boostrix) 09/17/2008        Health Maintenance                         Health Maintenance Due   Topic Date Due     Diabetic Foot Exam  1965     Eye Exam  1965     FIT Test  05/08/1973     Zoster (Shingles) Vaccine (1 of 2) 05/08/2013     Annual Wellness Visit  05/02/2017     Osteoporosis Screening  06/26/2017     PAP Smear  05/02/2019     Cholesterol Lab  06/20/2019     Kidney Microalbumin Urine Test  06/20/2019     URINE DRUG SCREEN  06/20/2019     Mammogram  07/09/2019

## 2019-07-30 ENCOUNTER — HOSPITAL ENCOUNTER (OUTPATIENT)
Dept: WOUND CARE | Facility: CLINIC | Age: 54
Discharge: HOME | End: 2019-07-30
Attending: PHYSICIAN ASSISTANT | Admitting: PHYSICIAN ASSISTANT
Payer: COMMERCIAL

## 2019-07-30 VITALS — TEMPERATURE: 97.2 F | HEART RATE: 94 BPM | SYSTOLIC BLOOD PRESSURE: 144 MMHG | DIASTOLIC BLOOD PRESSURE: 65 MMHG

## 2019-07-30 DIAGNOSIS — I83.029 VENOUS STASIS ULCERS OF BOTH LOWER EXTREMITIES (H): ICD-10-CM

## 2019-07-30 DIAGNOSIS — S91.001A OPEN WOUND OF KNEE, LEG, AND ANKLE, RIGHT, INITIAL ENCOUNTER: ICD-10-CM

## 2019-07-30 DIAGNOSIS — L08.9 LOCAL INFECTION OF WOUND: Primary | ICD-10-CM

## 2019-07-30 DIAGNOSIS — I83.019 VENOUS STASIS ULCERS OF BOTH LOWER EXTREMITIES (H): ICD-10-CM

## 2019-07-30 DIAGNOSIS — L97.929 VENOUS STASIS ULCERS OF BOTH LOWER EXTREMITIES (H): ICD-10-CM

## 2019-07-30 DIAGNOSIS — S81.801A OPEN WOUND OF KNEE, LEG, AND ANKLE, RIGHT, INITIAL ENCOUNTER: ICD-10-CM

## 2019-07-30 DIAGNOSIS — S81.001A OPEN WOUND OF KNEE, LEG, AND ANKLE, RIGHT, INITIAL ENCOUNTER: ICD-10-CM

## 2019-07-30 DIAGNOSIS — L97.919 VENOUS STASIS ULCERS OF BOTH LOWER EXTREMITIES (H): ICD-10-CM

## 2019-07-30 DIAGNOSIS — L89.154 PRESSURE ULCER OF COCCYGEAL REGION, STAGE 4 (H): ICD-10-CM

## 2019-07-30 DIAGNOSIS — T14.8XXA LOCAL INFECTION OF WOUND: Primary | ICD-10-CM

## 2019-07-30 PROCEDURE — 87186 SC STD MICRODIL/AGAR DIL: CPT | Performed by: PHYSICIAN ASSISTANT

## 2019-07-30 PROCEDURE — A6212 FOAM DRG <=16 SQ IN W/BORDER: HCPCS

## 2019-07-30 PROCEDURE — 87077 CULTURE AEROBIC IDENTIFY: CPT | Performed by: PHYSICIAN ASSISTANT

## 2019-07-30 PROCEDURE — 87070 CULTURE OTHR SPECIMN AEROBIC: CPT | Performed by: PHYSICIAN ASSISTANT

## 2019-07-30 PROCEDURE — A6022 COLLAGEN DRSG>16<=48 SQ IN: HCPCS

## 2019-07-30 PROCEDURE — 11042 DBRDMT SUBQ TIS 1ST 20SQCM/<: CPT | Performed by: PHYSICIAN ASSISTANT

## 2019-07-30 PROCEDURE — 87075 CULTR BACTERIA EXCEPT BLOOD: CPT | Performed by: PHYSICIAN ASSISTANT

## 2019-07-30 PROCEDURE — 11042 DBRDMT SUBQ TIS 1ST 20SQCM/<: CPT

## 2019-07-30 RX ORDER — CIPROFLOXACIN 500 MG/1
500 TABLET, FILM COATED ORAL 2 TIMES DAILY
Qty: 14 TABLET | Refills: 0 | Status: SHIPPED | OUTPATIENT
Start: 2019-07-30 | End: 2019-10-30

## 2019-07-30 NOTE — PROGRESS NOTES
[facility]    HISTORY OF PRESENT ILLNESS:   Amanda Richardson is a 54 year old, wheelchair-bound female with MS who returns today for a sacrococcygeal pressure ulcer. She developed this sometime in March 2019. Struggling with urinary issues in March and April and was in and out of the hospital and TCUs. Now back at home utilizing NPWT. Denies history of osteomyelitis.    Also struggles with lymphedema of lower legs with blisters off and on. Has worked with a lymphedema therapist but unsure whether she should be using dressings and wraps together.     INTERVAL HISTORY:    Sacral ulcer is improving    Still having trouble with lymphedema in legs with secondary ulcers, working with lymphedema therapist who recommend starting velcro wraps, needs an order for this    Continuing to have green drainage from right leg ulcer despite acetic acid soaks    CURRENT/PREVIOUS DRESSING: NPWT on sacral ulcer, xeroform on legs with lymphedema wraps    COMPRESSION/OFFLOADING: repositioning frequently, not utilizing any offloading cushions, sleeps on standard bed    BARRIERS TO HEALING: immobility, obesity, pre-diabetes    DATE WOUND ACQUIRED: 3/2019, debrided    REVIEW OF SYSTEMS:  CONSTITUTIONAL: Denies fevers or acute illness  ENDOCRINE: pre-diabetic    PAST MEDICAL HISTORY:  has a past medical history of Abnormality of gait (7/27/2012), Arrhythmia, Chronic pain, CKD (chronic kidney disease) stage 1, GFR 90 ml/min or greater, Colon polyps (1/15), Gallstone (6/11/2012), Hyperlipidemia LDL goal <70, Hypertension goal BP (blood pressure) < 140/90, Leukocytosis (6/11/2012), Moderate depressive episode (H), MS (multiple sclerosis) (H) (2003), Multiple sclerosis (H), Non Hodgkin's lymphoma (H) (06/11/2012), Nonallopathic lesion of cervical region, not elsewhere classified (9/24/2012), Nonallopathic lesion of thoracic region, not elsewhere classified (9/24/2012), Numbness and tingling, Obesity (6/11/2012), Other  chronic pain, Pain in joint, pelvic region and thigh (7/20/2012), Prediabetes, S/P right hip fracture, Spinal stenosis, lumbar (6/17/2012), T-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    SOCIAL HISTORY: , lives at home with , Fernando who applies her wound VAC 1-2x/wk and home care once weekly  TOBACCO STATUS:  reports that she quit smoking about 5 years ago. Her smoking use included cigarettes. She smoked 0.50 packs per day. She has never used smokeless tobacco.    MEDICATIONS:   Current Outpatient Medications   Medication     acetaminophen (TYLENOL) 325 MG tablet     amitriptyline (ELAVIL) 100 MG tablet     atorvastatin (LIPITOR) 10 MG tablet     baclofen (LIORESAL) 10 MG tablet     baclofen (LIORESAL) intraTHECAL Internal Pump     ciprofloxacin (CIPRO) 500 MG tablet     gabapentin (NEURONTIN) 300 MG capsule     losartan-hydrochlorothiazide (HYZAAR) 100-25 MG tablet     metFORMIN (GLUCOPHAGE) 500 MG tablet     metoprolol succinate (TOPROL-XL) 50 MG 24 hr tablet     Multiple Vitamin (MULTI-VITAMIN PO)     naloxone (NARCAN) 4 MG/0.1ML nasal spray     omega-3 fatty acids (FISH OIL) 1200 MG capsule     order for DME     [START ON 8/1/2019] oxyCODONE IR (ROXICODONE) 15 MG tablet     senna-docusate (SENOKOT-S/PERICOLACE) 8.6-50 MG tablet     No current facility-administered medications for this encounter.        VITALS: BP (!) 144/65   Pulse 94   Temp 97.2  F (36.2  C)   LMP 12/11/2011      PERTINENT LABS:    Recent Labs   Lab Test 06/21/19  1611  03/17/19  0615  10/03/13  1614   ALBUMIN 3.0*   < > 2.1*   < > 3.6*   HGB 11.0*   < > 9.4*   < > 12.7   INR  --   --   --   --  0.93   WBC 15.8*   < > 31.1*   < > 14.4*   A1C  --   --  6.3*   < >  --    CRP 43.0*  --   --    < >  --     < > = values in this interval not displayed.     PHYSICAL EXAM:  GENERAL: Patient is alert and oriented and in no acute distress  INTEGUMENTARY:   Left leg was not examined  today due to lymphedema wraps, see previous note for documentation of wounds  Wound (used by OP [facility]I only) 07/09/19 1527 Left coccyx pressure injury (Active)   Length (cm) 1.3 7/30/2019  3:47 PM   Width (cm) 1.5 7/30/2019  3:47 PM   Depth (cm) 2.5 7/30/2019  3:47 PM   Wound (cm^2) 1.95 cm^2 7/30/2019  3:47 PM   Wound Volume (cm^3) 4.88 cm^3 7/30/2019  3:47 PM   Wound healing % 40.91 7/30/2019  3:47 PM   Undermining [Depth (cm)/Location] 12to2/4.1 7/30/2019  3:47 PM   Dressing Appearance moist drainage 7/30/2019  3:47 PM   Drainage Characteristics/Odor serosanguineous 7/30/2019  3:47 PM   Drainage Amount moderate 7/30/2019  3:47 PM   Thickness/Stage Stage 4 7/30/2019  4:25 PM   Base red/granulating 7/30/2019  4:25 PM   Periwound intact 7/30/2019  4:25 PM   Periwound Temperature warm 7/30/2019  4:25 PM   Care, Wound debrided 7/30/2019  4:25 PM       Wound (used by OP [facility] only) 07/30/19 1624 Right lower;lateral leg lymphedema (Active)   Length (cm) 4 7/30/2019  4:25 PM   Width (cm) 3 7/30/2019  4:25 PM   Depth (cm) 0.1 7/30/2019  4:25 PM   Wound (cm^2) 12 cm^2 7/30/2019  4:25 PM   Wound Volume (cm^3) 1.2 cm^3 7/30/2019  4:25 PM   Dressing Appearance copious drainage 7/30/2019  4:25 PM   Drainage Characteristics/Odor serosanguineous 7/30/2019  4:25 PM   Drainage Amount copious 7/30/2019  4:25 PM   Thickness/Stage full thickness 7/30/2019  4:25 PM   Base red/granulating 7/30/2019  4:25 PM   Periwound intact;swelling 7/30/2019  4:25 PM   Periwound Temperature warm 7/30/2019  4:25 PM   Care, Wound debrided 7/30/2019  4:25 PM         PROCEDURE: 4% topical lidocaine was applied to the wound by nursing staff. Patient was determined to be capable of making their own medical decisions and informed consent was obtained. Using a sharp curette a surgical debridement was performed down to and including subcutaneous tissue of <20 cm2. Hemostasis was achieved with pressure. The patient tolerated the procedure well.      ASSESSMENT:    1. Stage 3 pressure ulcer of sacrococcygeal  2. Adherence to plan of care - unknown as this is initial assessment  3. Lymphedema of bilateral lower legs  4. Full-thickness ulceration of bilateral lower legs with fat-layer exposed  5. Pseudomonas infection of right lower leg - cellulitis    PLAN/DISCUSSION:   1. Wound care plan: continue NPWT on sacrum, continue xeroform on leg ulcers  2. Wound culture pending of right leg, will start cipro empirically 500mg BID x 7 d  3. Discussed possibility of osteo, patient would like to hold off on MRI for now but we will pursue this if wound stalls  4. Continue compression therapy as recommended by lymphedema therapist    FOLLOW-UP: 3-4 weeks    AILEEN CARD PA-C ([facility])

## 2019-07-30 NOTE — DISCHARGE INSTRUCTIONS
[facility]  [address] [facility] [address] [address]  Appointment Phone [phone] Nurse Advisors [phone]    Amanda Richardson      1965    [facility] Phone:[phone]  Fax:[phone]     Wound Dressing Change to coccyx  Cleanse inside wound with saline or wound cleanser  Skin Care: Use Skin Prep to romeo-wound skin  NPWT using black foam into the wound depth of 4.1cm and pressure set to 125mmHg continuous suction. Bridge the hub to the patient's hip so she is not sitting on it.   Change dressing Monday, Wednesday and Fridy     Wound Dressing Change to right lateral lower leg ulcer  Cleanse wound with wound cleanser then vinegar soaks  Cover wound with Xerform, ABD, then kerlix  Change dressing daily    Continue to use Lymphedema wraps for edema control.        Liya Robles PA-C. July 30, 2019  Signing Physician Dr. Lake Matos    Call us at [phone] if you have any questions about your wounds, have redness or swelling around your wound, have a fever of 101 or greater or if you have any other problems or concerns. We answer the phone Monday through Friday 8 am to 4 pm, please leave a message as we check the voicemail frequently throughout the day.     Follow up with Connie Robles PA-C in 3 weeks

## 2019-07-31 NOTE — TELEPHONE ENCOUNTER
Patient currently using short stretch wraps for compression. She is requesting order for Velcro Compression wraps.   Orders Faxed to  [facility] Phone:[phone] Fax:[phone]    Ok to use Velcro compression wraps. To be wore 23 of the 24 hours of the day. Off for bathing and cares and than replaced.        Liya Robles PA-C. July 31, 2019

## 2019-08-02 ENCOUNTER — TELEPHONE (OUTPATIENT)
Dept: WOUND CARE | Facility: CLINIC | Age: 54
End: 2019-08-02

## 2019-08-02 LAB
BACTERIA SPEC CULT: ABNORMAL
Lab: ABNORMAL
SPECIMEN SOURCE: ABNORMAL

## 2019-08-02 NOTE — TELEPHONE ENCOUNTER
Reason for Call:  Heidi from [facility] called regarding wounds draining through the ABD pads. Requesting orders for Qwick Non Adhesive dressing as absorbant pad.      Pt Provider: Liya Robles PA-C gave orders for change in outer pad.      Phone Number can be reached at: [phone]     Can we leave a detailed message on this number? YES      Cheryl Lakhani RN on 8/2/2019 at 9:10 AM

## 2019-08-02 NOTE — RESULT ENCOUNTER NOTE
Good Morning Amanda,    Your wound culture came back positive as we expected. Both strains of bacteria will be treated by the cipro you started. Go ahead and finish out that course of antibiotics as planned. Please feel free to call if you have any further questions or issues.     Have a great day!  Connie Robles PA-C

## 2019-08-03 DIAGNOSIS — I10 HYPERTENSION GOAL BP (BLOOD PRESSURE) < 140/90: ICD-10-CM

## 2019-08-03 DIAGNOSIS — N18.1 CKD (CHRONIC KIDNEY DISEASE) STAGE 1, GFR 90 ML/MIN OR GREATER: ICD-10-CM

## 2019-08-03 DIAGNOSIS — G35 MS (MULTIPLE SCLEROSIS) (H): ICD-10-CM

## 2019-08-03 DIAGNOSIS — Z51.81 MEDICATION MONITORING ENCOUNTER: ICD-10-CM

## 2019-08-03 DIAGNOSIS — E78.5 HYPERLIPIDEMIA LDL GOAL <70: ICD-10-CM

## 2019-08-03 DIAGNOSIS — F32.A MODERATE DEPRESSIVE EPISODE: ICD-10-CM

## 2019-08-03 DIAGNOSIS — L89.309 PRESSURE INJURY OF SKIN OF BUTTOCK, UNSPECIFIED INJURY STAGE, UNSPECIFIED LATERALITY: ICD-10-CM

## 2019-08-03 DIAGNOSIS — L89.159 PRESSURE INJURY OF SKIN OF SACRAL REGION, UNSPECIFIED INJURY STAGE: ICD-10-CM

## 2019-08-03 DIAGNOSIS — E66.01 MORBID OBESITY (H): ICD-10-CM

## 2019-08-03 DIAGNOSIS — N18.1 TYPE 2 DIABETES MELLITUS WITH STAGE 1 CHRONIC KIDNEY DISEASE, WITHOUT LONG-TERM CURRENT USE OF INSULIN (H): ICD-10-CM

## 2019-08-03 DIAGNOSIS — I89.0 LYMPHEDEMA: ICD-10-CM

## 2019-08-03 DIAGNOSIS — C85.90 T-CELL LYMPHOMA (H): ICD-10-CM

## 2019-08-03 DIAGNOSIS — E11.22 TYPE 2 DIABETES MELLITUS WITH STAGE 1 CHRONIC KIDNEY DISEASE, WITHOUT LONG-TERM CURRENT USE OF INSULIN (H): ICD-10-CM

## 2019-08-03 LAB
ALBUMIN SERPL-MCNC: 2.8 G/DL (ref 3.4–5)
ALP SERPL-CCNC: 123 U/L (ref 40–150)
ALT SERPL W P-5'-P-CCNC: 31 U/L (ref 0–50)
ANION GAP SERPL CALCULATED.3IONS-SCNC: 7 MMOL/L (ref 3–14)
AST SERPL W P-5'-P-CCNC: 14 U/L (ref 0–45)
BILIRUB SERPL-MCNC: 0.2 MG/DL (ref 0.2–1.3)
BUN SERPL-MCNC: 20 MG/DL (ref 7–30)
CALCIUM SERPL-MCNC: 8.6 MG/DL (ref 8.5–10.1)
CHLORIDE SERPL-SCNC: 103 MMOL/L (ref 94–109)
CHOLEST SERPL-MCNC: 124 MG/DL
CK SERPL-CCNC: 37 U/L (ref 30–225)
CO2 SERPL-SCNC: 29 MMOL/L (ref 20–32)
CREAT SERPL-MCNC: 0.47 MG/DL (ref 0.52–1.04)
CRP SERPL-MCNC: 31 MG/L (ref 0–8)
ERYTHROCYTE [DISTWIDTH] IN BLOOD BY AUTOMATED COUNT: 16.4 % (ref 10–15)
ERYTHROCYTE [SEDIMENTATION RATE] IN BLOOD BY WESTERGREN METHOD: 80 MM/H (ref 0–30)
GFR SERPL CREATININE-BSD FRML MDRD: >90 ML/MIN/{1.73_M2}
GLUCOSE SERPL-MCNC: 124 MG/DL (ref 70–99)
HBA1C MFR BLD: 6.4 % (ref 0–5.6)
HCT VFR BLD AUTO: 37.6 % (ref 35–47)
HDLC SERPL-MCNC: 36 MG/DL
HGB BLD-MCNC: 11.3 G/DL (ref 11.7–15.7)
LDLC SERPL CALC-MCNC: 71 MG/DL
MCH RBC QN AUTO: 23.8 PG (ref 26.5–33)
MCHC RBC AUTO-ENTMCNC: 30.1 G/DL (ref 31.5–36.5)
MCV RBC AUTO: 79 FL (ref 78–100)
NONHDLC SERPL-MCNC: 88 MG/DL
PLATELET # BLD AUTO: 326 10E9/L (ref 150–450)
POTASSIUM SERPL-SCNC: 4 MMOL/L (ref 3.4–5.3)
PROT SERPL-MCNC: 8.6 G/DL (ref 6.8–8.8)
RBC # BLD AUTO: 4.75 10E12/L (ref 3.8–5.2)
SODIUM SERPL-SCNC: 139 MMOL/L (ref 133–144)
TRIGL SERPL-MCNC: 84 MG/DL
TSH SERPL DL<=0.005 MIU/L-ACNC: 3.91 MU/L (ref 0.4–4)
WBC # BLD AUTO: 13.7 10E9/L (ref 4–11)

## 2019-08-03 PROCEDURE — 83036 HEMOGLOBIN GLYCOSYLATED A1C: CPT | Performed by: FAMILY MEDICINE

## 2019-08-03 PROCEDURE — 86140 C-REACTIVE PROTEIN: CPT | Performed by: FAMILY MEDICINE

## 2019-08-03 PROCEDURE — 80053 COMPREHEN METABOLIC PANEL: CPT | Performed by: FAMILY MEDICINE

## 2019-08-03 PROCEDURE — 85652 RBC SED RATE AUTOMATED: CPT | Performed by: FAMILY MEDICINE

## 2019-08-03 PROCEDURE — 84443 ASSAY THYROID STIM HORMONE: CPT | Performed by: FAMILY MEDICINE

## 2019-08-03 PROCEDURE — 82550 ASSAY OF CK (CPK): CPT | Performed by: FAMILY MEDICINE

## 2019-08-03 PROCEDURE — 36415 COLL VENOUS BLD VENIPUNCTURE: CPT | Performed by: FAMILY MEDICINE

## 2019-08-03 PROCEDURE — 80061 LIPID PANEL: CPT | Performed by: FAMILY MEDICINE

## 2019-08-03 PROCEDURE — 85027 COMPLETE CBC AUTOMATED: CPT | Performed by: FAMILY MEDICINE

## 2019-08-05 ENCOUNTER — TELEPHONE (OUTPATIENT)
Dept: WOUND CARE | Facility: CLINIC | Age: 54
End: 2019-08-05

## 2019-08-05 DIAGNOSIS — L08.9 LOCAL INFECTION OF WOUND: Primary | ICD-10-CM

## 2019-08-05 DIAGNOSIS — T14.8XXA LOCAL INFECTION OF WOUND: Primary | ICD-10-CM

## 2019-08-05 RX ORDER — CEPHALEXIN 500 MG/1
500 CAPSULE ORAL 3 TIMES DAILY
Qty: 21 CAPSULE | Refills: 0 | Status: SHIPPED | OUTPATIENT
Start: 2019-08-05 | End: 2019-10-30

## 2019-08-05 NOTE — TELEPHONE ENCOUNTER
Reason for Call:  Home care nurse called regarding redness, swelling and warmth to Left Leg. She is finishing her course of cipro tomorrow.She notes that she still has an elevated white count     Pt Provider: Liya Robles PA-C was notified and wanted to start antibiotic therapy. Keflex sent to her pharmacy     Phone Number can be reached at: [phone]     Can we leave a detailed message on this number? YES     Cheryl Lakhani RN on 8/5/2019 at 12:02 PM

## 2019-08-06 LAB
BACTERIA SPEC CULT: ABNORMAL
BACTERIA SPEC CULT: ABNORMAL
Lab: ABNORMAL
SPECIMEN SOURCE: ABNORMAL

## 2019-08-12 ENCOUNTER — NURSE TRIAGE (OUTPATIENT)
Dept: NURSING | Facility: CLINIC | Age: 54
End: 2019-08-12

## 2019-08-12 ENCOUNTER — TELEPHONE (OUTPATIENT)
Dept: FAMILY MEDICINE | Facility: CLINIC | Age: 54
End: 2019-08-12

## 2019-08-12 DIAGNOSIS — N18.1 TYPE 2 DIABETES MELLITUS WITH STAGE 1 CHRONIC KIDNEY DISEASE, WITHOUT LONG-TERM CURRENT USE OF INSULIN (H): ICD-10-CM

## 2019-08-12 DIAGNOSIS — E11.9 TYPE 2 DIABETES, HBA1C GOAL < 7% (H): Primary | ICD-10-CM

## 2019-08-12 DIAGNOSIS — R42 DIZZINESS: ICD-10-CM

## 2019-08-12 DIAGNOSIS — E11.22 TYPE 2 DIABETES MELLITUS WITH STAGE 1 CHRONIC KIDNEY DISEASE, WITHOUT LONG-TERM CURRENT USE OF INSULIN (H): ICD-10-CM

## 2019-08-12 DIAGNOSIS — R82.90 CLOUDY URINE: ICD-10-CM

## 2019-08-12 RX ORDER — BLOOD-GLUCOSE CONTROL, NORMAL
EACH MISCELLANEOUS
Qty: 1 EACH | Refills: 0 | Status: SHIPPED | OUTPATIENT
Start: 2019-08-12

## 2019-08-12 RX ORDER — BLOOD-GLUCOSE METER
EACH MISCELLANEOUS
Qty: 1 KIT | Refills: 0 | Status: SHIPPED | OUTPATIENT
Start: 2019-08-12

## 2019-08-12 NOTE — TELEPHONE ENCOUNTER
Clinic Action Needed:  Yes, prescriptions  FNA Triage Call  Presenting Problem:    Cordelia RN with [facility] is calling and is requesting an order for glucometer and lancets and strips for Amanda from [facility].   Cordelia can be reached at [phone].  Pharmacy is [facility] in [place].     Routed to:  RN Pool    Please be sure to close this encounter once this patient's issue/question has been addressed.    Florecita Enciso RN/[place]

## 2019-08-12 NOTE — TELEPHONE ENCOUNTER
Rx for lancets & control solution sent to pharmacy (glucometer & test strips were already sent)  Labs pended    Attempt #1  Called FREDO Storey @ [phone] - Left a detailed message to call back and speak with any triage nurse.    Ivanna Kelly RN  [place] Triage

## 2019-08-12 NOTE — TELEPHONE ENCOUNTER
Glucometer and testing supplies already sent to pharmacy.    Routing to TS to review and advise on Metformin question below.      Chandrika Nathan, LILLIANA, RN, PHN  [facility]) [phone]

## 2019-08-12 NOTE — TELEPHONE ENCOUNTER
Cordelia RN with [facility] is calling and is requesting an order for glucometer and lancets and strips for Amanda from [facility].   Cordelia can be reached at [phone].  Pharmacy is [facility] in [place].

## 2019-08-12 NOTE — TELEPHONE ENCOUNTER
Prescription approved per [facility] Refill Protocol.    Chandrika Nathan, BS, RN, N  [facility]) [phone]

## 2019-08-12 NOTE — TELEPHONE ENCOUNTER
Message handled by Nurse Triage with [facility] - provider name: [facility] - advise UA/UC, CMP, CBC. OK for glucometer, test strips, lancets, control solution - 1x daily testing & PRN, refill 3months x 3 refills.     Ivanna Kelly RN  [place] Triage

## 2019-08-12 NOTE — TELEPHONE ENCOUNTER
Clinic Action Needed:  Yes, callback  FNA Triage Call  Presenting Problem:    Cordelia RN with [facility] is calling and states that Amanda was taken off Metformin at the TCU but Amanda continued taking 500mg metformin daily.  Now one or two times a week she is having cloudiness and dizziness.  This is the reason RN is requesting order for glucometer.  RN felt that maybe blood sugar is going to low.  RN is not sure if she should be taking metformin or not.  Please phone Cordelia at [phone].      Routed to:  RN Pool    Please be sure to close this encounter once this patient's issue/question has been addressed.    Florecita Enciso RN/[place]

## 2019-08-12 NOTE — TELEPHONE ENCOUNTER
Cordelia RN with [facility] is calling and states that Amanda was taken off Metformin at the TCU but Amanda continued taking 500mg metformin daily.  Now one or two times a week she is having cloudiness and dizziness.  This is the reason RN is requesting order for glucometer.  RN felt that maybe blood sugar is going to low.  RN is not sure if she should be taking metformin or not.  Please phone Cordelia at [phone].

## 2019-08-13 ENCOUNTER — TELEPHONE (OUTPATIENT)
Dept: WOUND CARE | Facility: CLINIC | Age: 54
End: 2019-08-13

## 2019-08-13 NOTE — TELEPHONE ENCOUNTER
Reason for Call:  Scarlet from [facility] called regarding needing to order NWPT supplies.   Faxed measurements for additional supplies for coccyx wound.       Pt Provider: Liya Robles PA-C     Phone Number can be reached at: [phone]  [phone]     Can we leave a detailed message on this number? YES      Mery García RN on 8/13/2019 at 1:32 PM

## 2019-08-16 ENCOUNTER — TELEPHONE (OUTPATIENT)
Dept: WOUND CARE | Facility: CLINIC | Age: 54
End: 2019-08-16

## 2019-08-16 RX ORDER — ACETIC ACID 0.25 G/100ML
IRRIGANT IRRIGATION DAILY
Qty: 250 ML | Refills: 0 | Status: SHIPPED | OUTPATIENT
Start: 2019-08-16 | End: 2020-05-04

## 2019-08-16 NOTE — ADDENDUM NOTE
Encounter addended by: Ivanna Turcios RN on: 8/16/2019 1:13 PM   Actions taken: Order list changed, Diagnosis association updated

## 2019-08-16 NOTE — TELEPHONE ENCOUNTER
Home care nurse calling to report blue green drainage from right lateral lower leg ulcer that has had previous pseudomonas. Currently using calcium alginate. Orders given to switch to Hydrofera Blue dressings with acetic acid soaks. Patient will follow up with Connie Robles PA-C next Wednesday.

## 2019-08-20 ENCOUNTER — TRANSFERRED RECORDS (OUTPATIENT)
Dept: HEALTH INFORMATION MANAGEMENT | Facility: CLINIC | Age: 54
End: 2019-08-20

## 2019-08-21 ENCOUNTER — HOSPITAL ENCOUNTER (OUTPATIENT)
Dept: WOUND CARE | Facility: CLINIC | Age: 54
Discharge: HOME | End: 2019-08-21
Attending: PHYSICIAN ASSISTANT | Admitting: PHYSICIAN ASSISTANT
Payer: COMMERCIAL

## 2019-08-21 VITALS
TEMPERATURE: 97.8 F | HEART RATE: 99 BPM | DIASTOLIC BLOOD PRESSURE: 81 MMHG | SYSTOLIC BLOOD PRESSURE: 144 MMHG | RESPIRATION RATE: 18 BRPM

## 2019-08-21 DIAGNOSIS — L97.929 VENOUS STASIS ULCERS OF BOTH LOWER EXTREMITIES (H): ICD-10-CM

## 2019-08-21 DIAGNOSIS — I89.0 LYMPHEDEMA: Primary | ICD-10-CM

## 2019-08-21 DIAGNOSIS — I83.019 VENOUS STASIS ULCERS OF BOTH LOWER EXTREMITIES (H): ICD-10-CM

## 2019-08-21 DIAGNOSIS — I83.029 VENOUS STASIS ULCERS OF BOTH LOWER EXTREMITIES (H): ICD-10-CM

## 2019-08-21 DIAGNOSIS — L97.919 VENOUS STASIS ULCERS OF BOTH LOWER EXTREMITIES (H): ICD-10-CM

## 2019-08-21 PROCEDURE — A6021 COLLAGEN DRESSING <=16 SQ IN: HCPCS

## 2019-08-21 PROCEDURE — A6209 FOAM DRSG <=16 SQ IN W/O BDR: HCPCS

## 2019-08-21 PROCEDURE — A6212 FOAM DRG <=16 SQ IN W/BORDER: HCPCS

## 2019-08-21 PROCEDURE — 97602 WOUND(S) CARE NON-SELECTIVE: CPT

## 2019-08-21 PROCEDURE — 99214 OFFICE O/P EST MOD 30 MIN: CPT | Performed by: PHYSICIAN ASSISTANT

## 2019-08-21 NOTE — DISCHARGE INSTRUCTIONS
[facility]  [address] [facility] [address], [place] [address]  Appointment Phone [phone] Nurse Advisors [phone]    Amanda Richardson      1965    [facility] Phone:[phone] Fax:[phone]    Wound Dressing Change to coccyx  Cleanse inside wound with saline or wound cleanser  Skin Care: Use Skin Prep to romeo-wound skin  Apply Endoform into wound, cover with Mepilex border   Change dressing Monday, Wednesday and Fridy  Wound Dressing Change to left when open and right lateral lower leg ulcer  Cleanse wound with wound cleanser then vinegar soaks  Cover wound with hydrofera blue ready, ABD, then kerlix  Change dressing daily PRN    Continue to use Lymphedema wraps for edema control.     Liya Robles PA-C. August 21, 2019  Signing Physician Lake Maots M.D.    Call us at [phone] if you have any questions about your wounds, have redness or swelling around your wound, have a fever of 101 or greater or if you have any other problems or concerns. We answer the phone Monday through Friday 8 am to 4 pm, please leave a message as we check the voicemail frequently throughout the day.     Follow up with Provider - 3 weeks Dr. Eugene

## 2019-08-22 PROBLEM — I89.0 LYMPHEDEMA: Status: ACTIVE | Noted: 2019-08-22

## 2019-08-22 NOTE — PROGRESS NOTES
[facility]    HISTORY OF PRESENT ILLNESS:   Amanda Richardson is a 54 year old, wheelchair-bound female with MS who returns today for a sacrococcygeal pressure ulcer. She developed this sometime in March 2019. Struggling with urinary issues in March and April and was in and out of the hospital and TCUs. Now back at home utilizing NPWT. Denies history of osteomyelitis.    Also struggles with lymphedema of lower legs with blisters off and on. Has worked with a lymphedema therapist only once and had short stretch wraps but now transitioned into velcro wraps    INTERVAL HISTORY:    Sacral ulcer is greatly improved - continues on VAC    Still having trouble with lymphedema in legs multiple bouts of cellulitis    Drainage much improved with keflex + cipro, leg wound nearly healed    CURRENT/PREVIOUS DRESSING: NPWT on sacral ulcer, calcium alginate on legs with lymphedema wraps    COMPRESSION/OFFLOADING: repositioning frequently, not utilizing any offloading cushions, sleeps on standard bed    BARRIERS TO HEALING: immobility, obesity, pre-diabetes    DATE WOUND ACQUIRED: 3/2019, debrided    REVIEW OF SYSTEMS:  CONSTITUTIONAL: Denies fevers or acute illness  ENDOCRINE: pre-diabetic    PAST MEDICAL HISTORY:  has a past medical history of Abnormality of gait (7/27/2012), Arrhythmia, Chronic pain, CKD (chronic kidney disease) stage 1, GFR 90 ml/min or greater, Colon polyps (1/15), Gallstone (6/11/2012), Hyperlipidemia LDL goal <70, Hypertension goal BP (blood pressure) < 140/90, Leukocytosis (6/11/2012), Moderate depressive episode (H), MS (multiple sclerosis) (H) (2003), Multiple sclerosis (H), Non Hodgkin's lymphoma (H) (06/11/2012), Nonallopathic lesion of cervical region, not elsewhere classified (9/24/2012), Nonallopathic lesion of thoracic region, not elsewhere classified (9/24/2012), Numbness and tingling, Obesity (6/11/2012), Other chronic pain, Pain in joint, pelvic region and thigh (7/20/2012),  Prediabetes, S/P right hip fracture, Spinal stenosis, lumbar (6/17/2012), T-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    SOCIAL HISTORY: , lives at home with , Fernando who applies her wound VAC 1-2x/wk and home care once weekly  TOBACCO STATUS:  reports that she quit smoking about 6 years ago. Her smoking use included cigarettes. She smoked 0.50 packs per day. She has never used smokeless tobacco.    MEDICATIONS:   Current Outpatient Medications   Medication     acetaminophen (TYLENOL) 325 MG tablet     acetic acid 0.25 % irrigation     amitriptyline (ELAVIL) 100 MG tablet     atorvastatin (LIPITOR) 10 MG tablet     baclofen (LIORESAL) 10 MG tablet     baclofen (LIORESAL) intraTHECAL Internal Pump     blood glucose (ACCU-CHEK KEL) test strip     blood glucose (NO BRAND SPECIFIED) lancets standard     blood glucose calibration (NO BRAND SPECIFIED) solution     blood glucose monitoring (ACCU-CHEK KEL PLUS) meter device kit     blood glucose monitoring (ACCU-CHEK MULTICLIX) lancets     gabapentin (NEURONTIN) 300 MG capsule     losartan-hydrochlorothiazide (HYZAAR) 100-25 MG tablet     metFORMIN (GLUCOPHAGE) 500 MG tablet     metoprolol succinate ER (TOPROL-XL) 50 MG 24 hr tablet     Multiple Vitamin (MULTI-VITAMIN PO)     naloxone (NARCAN) 4 MG/0.1ML nasal spray     omega-3 fatty acids (FISH OIL) 1200 MG capsule     order for DME     order for DME     oxyCODONE IR (ROXICODONE) 15 MG tablet     senna-docusate (SENOKOT-S/PERICOLACE) 8.6-50 MG tablet     No current facility-administered medications for this encounter.        VITALS: BP (!) 144/81   Pulse 99   Temp 97.8  F (36.6  C) (Temporal)   Resp 18   LMP 12/11/2011      PERTINENT LABS:    Recent Labs   Lab Test 06/21/19  1611  03/17/19  0615  10/03/13  1614   ALBUMIN 3.0*   < > 2.1*   < > 3.6*   HGB 11.0*   < > 9.4*   < > 12.7   INR  --   --   --   --  0.93   WBC 15.8*   < > 31.1*   < > 14.4*    A1C  --   --  6.3*   < >  --    CRP 43.0*  --   --    < >  --     < > = values in this interval not displayed.     PHYSICAL EXAM:  GENERAL: Patient is alert and oriented and in no acute distress  INTEGUMENTARY:   Bilateral legs with massive lymphedema, pappilomatosis, + stemmer sign  Wound (used by OP [facility]I only) 07/09/19 1527 Left coccyx pressure injury (Active)   Length (cm) 1.4 8/21/2019  3:00 PM   Width (cm) 0.9 8/21/2019  3:00 PM   Depth (cm) 1.4 8/21/2019  3:00 PM   Wound (cm^2) 1.26 cm^2 8/21/2019  3:00 PM   Wound Volume (cm^3) 1.76 cm^3 8/21/2019  3:00 PM   Wound healing % 61.82 8/21/2019  3:00 PM   Tunneling [Depth (cm)/Location] @11 O'clock Depth: 3.6 8/21/2019  3:00 PM   Dressing Appearance moist drainage 8/21/2019  3:00 PM   Drainage Characteristics/Odor serosanguineous 8/21/2019  3:00 PM   Drainage Amount moderate 8/21/2019  3:00 PM       Wound (used by OP WHI only) 07/30/19 1624 Right lower;lateral leg lymphedema (Active)   Length (cm) 5 8/21/2019  3:00 PM   Width (cm) 2 8/21/2019  3:00 PM   Depth (cm) 0.2 8/21/2019  3:00 PM   Wound (cm^2) 10 cm^2 8/21/2019  3:00 PM   Wound Volume (cm^3) 2 cm^3 8/21/2019  3:00 PM   Wound healing % 16.67 8/21/2019  3:00 PM   Dressing Appearance moist drainage 8/21/2019  3:00 PM   Drainage Characteristics/Odor serosanguineous 8/21/2019  3:00 PM   Drainage Amount moderate 8/21/2019  3:00 PM     Limb measurements:  Mid-foot:  R) 27cm L)29cm  Calf:  R) 51cm L)59cm  Thigh:  R)69cm L)70cm  Waist:  134cm          ASSESSMENT:   1. Stage 3 pressure ulcer of sacrococcygeal  2. Adherence to plan of care - good   3. Lymphedema of bilateral lower legs  4. Full-thickness ulceration of right lower leg with fat-layer exposed    PLAN/DISCUSSION:   1. Wound care plan: discontinue NPWT, start endoform + mepilex on coccyx, hydrofera blue to leg   2. Refer to lymphedema therapy for manual decongestive therapy  3. Will order pump for home use per Dr. Eugene  4. Discussed possibility of osteo,  patient would like to hold off on MRI for now but we will pursue this if wound stalls  5. Continue compression therapy as recommended by lymphedema therapist    FOLLOW-UP: 3-4 weeks with Dr. Valorie CARD PA-C ([facility])

## 2019-08-22 NOTE — ADDENDUM NOTE
Encounter addended by: Cheryl Lakhani RN on: 8/22/2019 2:18 PM   Actions taken: Problem List modified, Visit diagnoses modified, Order list changed, Diagnosis association updated

## 2019-08-26 ENCOUNTER — MEDICAL CORRESPONDENCE (OUTPATIENT)
Dept: HEALTH INFORMATION MANAGEMENT | Facility: CLINIC | Age: 54
End: 2019-08-26

## 2019-08-27 ENCOUNTER — TELEPHONE (OUTPATIENT)
Dept: FAMILY MEDICINE | Facility: CLINIC | Age: 54
End: 2019-08-27

## 2019-08-27 NOTE — TELEPHONE ENCOUNTER
Completed forms for revision to plan of care/ wound care faxed back to [facility] at [phone].   Originals sent to be scanned.     Katharina Goodwin

## 2019-08-28 ENCOUNTER — MYC REFILL (OUTPATIENT)
Dept: FAMILY MEDICINE | Facility: CLINIC | Age: 54
End: 2019-08-28

## 2019-08-28 DIAGNOSIS — G89.4 CHRONIC PAIN SYNDROME: ICD-10-CM

## 2019-08-28 DIAGNOSIS — G35 MS (MULTIPLE SCLEROSIS) (H): ICD-10-CM

## 2019-08-28 RX ORDER — OXYCODONE HYDROCHLORIDE 15 MG/1
15 TABLET ORAL EVERY 6 HOURS PRN
Qty: 120 TABLET | Refills: 0 | Status: SHIPPED | OUTPATIENT
Start: 2019-08-31 | End: 2019-09-25

## 2019-08-28 NOTE — TELEPHONE ENCOUNTER
Outpatient Medication Detail      Disp Refills Start End AISHA   oxyCODONE IR (ROXICODONE) 15 MG tablet 120 tablet 0 8/1/2019  No   Sig - Route: Take 1 tablet (15 mg) by mouth every 6 hours as needed for moderate to severe pain Limit 4 tablet(s) per day. - Oral       Problem List Complete:    Yes    Last Office Visit with [facility] primary care provider: 07/29/2019    Future Office visit:   Next 5 appointments (look out 90 days)    Sep 11, 2019  3:00 PM CDT  Return Visit with Robert Eugene MD  [facility] [place] ([facility] [address] [facility] [address]  [place] [address]  [phone]   Oct 30, 2019  4:00 PM CDT  [facility] [address] [place] [address]  [phone]          Controlled substance agreement:   Encounter-Level CSA - 02/12/2018:    Controlled Substance Agreement - Scan on 2/28/2018 10:12 AM: CONTROLLED SUBSTANCE AGREEMENT (below)       Encounter-Level CSA - 06/09/2016:    Controlled Substance Agreement - Scan on 6/17/2016 10:49 AM: CONTROLLED SUBSTANCE AGREEMENT (below)       Encounter-Level CSA - 04/03/2015:    Controlled Substance Agreement - Scan on 4/12/2015 10:28 AM: Controlled Medication Agreement 04/03/15 (below)       Patient-Level CSA:    There are no patient-level csa.        Patient is followed by Julius Hassan MD  for ongoing prescription of pain medication.  All refills should only be approved by this provider, or covering partner.     Medication(s): Oxycodone 15 mg .   Maximum quantity per month: 120  Clinic visit frequency required: Q3 months      Controlled substance agreement:  Encounter-Level CSA - 06/09/2016:                     Controlled Substance Agreement - Scan on 6/17/2016 10:49 AM : CONTROLLED SUBSTANCE AGREEMENT (below)             Encounter-Level CSA - 06/09/2016:                     Controlled Substance Agreement - Scan on 4/12/2015 10:28  AM : Controlled Medication Agreement 04/03/15 (below)                Pain Clinic evaluation in the past: Yes       Date/Location:   [facility]  Current patient     DIRE Total Score(s): 14  No flowsheet data found.     Last [facility] website verification:  1/28/2019   [email or web address]      Last Urine Drug Screen:   Pain Drug SCR UR W RPTD Meds   Date Value Ref Range Status   06/20/2018 FINAL  Final     Comment:     (Note)  ====================================================================  TOXASSURE COMP DRUG ANALYSIS,UR  ====================================================================  Test                             Result       Flag       Units        Drug Present and Declared for Prescription Verification   Oxycodone                      1698         EXPECTED   ng/mg creat   Oxymorphone                    3971         EXPECTED   ng/mg creat   Noroxycodone                   4522         EXPECTED   ng/mg creat   Noroxymorphone                 1802         EXPECTED   ng/mg creat    Sources of oxycodone are scheduled prescription medications.    Oxymorphone, noroxycodone, and noroxymorphone are expected    metabolites of oxycodone. Oxymorphone is also available as a    scheduled prescription medication.   Gabapentin                     PRESENT      EXPECTED                 Baclofen                       PRESENT      EXPECTED                 Amitriptyline                  PRESENT      EXPECTED                 Nortriptyline                  PRESENT      EXPECTED                  Nortriptyline is an expected metabolite of amitriptyline.   Duloxetine                     PRESENT      EXPECTED                Drug Present not Declared for Prescription Verification   Metoprolol                     PRESENT      UNEXPECTED              ====================================================================  Test                      Result    Flag   Units      Ref Range        Creatinine               65               mg/dL      >=20            ====================================================================  Declared Medications:  The flagging and interpretation on this report are based on the  following declared medications.  Unexpected results may arise from  inaccuracies in the declared medications.  **Note: The testing scope of this panel includes these medications:  Amitriptyline (Elavil)  Baclofen  Duloxetine (Cymbalta)  Gabapentin  Oxycodone  ====================================================================  For clinical consultation, please call [phone].  ====================================================================  Analysis performed by [employer] [place] [address]     , No results found for: COMDAT, No results found for: THC13, PCP13, COC13, MAMP13, OPI13, AMP13, BZO13, TCA13, MTD13, BAR13, OXY13, PPX13, BUP13     Processing:  [facility] - [place], [place]    [email or web address]    Routing refill request to provider for review/approval because:  Drug not on the [facility] refill protocol       Ivanna Kelly RN  [place] Triage

## 2019-08-30 ENCOUNTER — TELEPHONE (OUTPATIENT)
Dept: FAMILY MEDICINE | Facility: CLINIC | Age: 54
End: 2019-08-30

## 2019-08-30 NOTE — TELEPHONE ENCOUNTER
Patient returning call    Advised of MD BOSTON message below - Patient stated an understanding and agreed with plan.  OV scheduled for 9/6/19    Ivanna Kelly RN  [facility]

## 2019-08-30 NOTE — TELEPHONE ENCOUNTER
Attempt #1  Called [employer] @ [phone] (home) - Left a non-detailed message to call back and speak with any triage nurse.    Ivanna Kelly RN  [place] Triage

## 2019-08-30 NOTE — TELEPHONE ENCOUNTER
Cordelia with [facility] home care calling states BP has been running 140-170 but pt has only been taking losartan 50 mg daily and is suppose to be taking 100 mg daily.  Will start taking 100 mg daily today and recheck bp next week.   States pt has blistering on the left leg and the right leg is healed.    Amanda RODRIGEZ RN  [place] Triage

## 2019-09-05 ENCOUNTER — MEDICAL CORRESPONDENCE (OUTPATIENT)
Dept: HEALTH INFORMATION MANAGEMENT | Facility: CLINIC | Age: 54
End: 2019-09-05

## 2019-09-05 ENCOUNTER — TELEPHONE (OUTPATIENT)
Dept: FAMILY MEDICINE | Facility: CLINIC | Age: 54
End: 2019-09-05

## 2019-09-05 NOTE — TELEPHONE ENCOUNTER
Completed forms for wound care faxed back to [facility] at [phone].   Originals sent to be scanned.     Katharina Goodwin

## 2019-09-11 ENCOUNTER — MEDICAL CORRESPONDENCE (OUTPATIENT)
Dept: HEALTH INFORMATION MANAGEMENT | Facility: CLINIC | Age: 54
End: 2019-09-11

## 2019-09-11 ENCOUNTER — HOSPITAL ENCOUNTER (OUTPATIENT)
Dept: WOUND CARE | Facility: CLINIC | Age: 54
Discharge: HOME | End: 2019-09-11
Attending: SURGERY | Admitting: SURGERY
Payer: COMMERCIAL

## 2019-09-11 ENCOUNTER — TELEPHONE (OUTPATIENT)
Dept: FAMILY MEDICINE | Facility: CLINIC | Age: 54
End: 2019-09-11

## 2019-09-11 VITALS
SYSTOLIC BLOOD PRESSURE: 126 MMHG | HEART RATE: 87 BPM | DIASTOLIC BLOOD PRESSURE: 75 MMHG | RESPIRATION RATE: 16 BRPM | TEMPERATURE: 97 F

## 2019-09-11 DIAGNOSIS — L89.324 PRESSURE INJURY OF LEFT BUTTOCK, STAGE 4 (H): ICD-10-CM

## 2019-09-11 PROCEDURE — A6021 COLLAGEN DRESSING <=16 SQ IN: HCPCS

## 2019-09-11 PROCEDURE — A6212 FOAM DRG <=16 SQ IN W/BORDER: HCPCS

## 2019-09-11 PROCEDURE — 97602 WOUND(S) CARE NON-SELECTIVE: CPT

## 2019-09-11 PROCEDURE — 99213 OFFICE O/P EST LOW 20 MIN: CPT | Performed by: SURGERY

## 2019-09-11 NOTE — TELEPHONE ENCOUNTER
Completed forms faxed back to [facility] at [phone].   Originals sent to be scanned.     Katharina Goodwin

## 2019-09-11 NOTE — TELEPHONE ENCOUNTER
Reason for Call:  Form, our goal is to have forms completed with 72 hours, however, some forms may require a visit or additional information.    Type of letter, form or note:  revision to plan of care    Who is the form from?: interim (if other please explain)    Where did the form come from: form was faxed in    What clinic location was the form placed at?: [place]    Where the form was placed: Given to physician    What number is listed as a contact on the form?: [phone], fax [phone]       Additional comments:     Call taken on 9/11/2019 at 8:26 AM by aKtharina Goodwin

## 2019-09-11 NOTE — PROGRESS NOTES
[facility] [place] Progress Note    Subject: Amanda Richardson 54-year-old female, chronic bilateral extremity primary lymphedema tarda, chronic bilateral chronic inflammatory changes, sacral decubitus ulceration managed with endoform dressing changes.  Reviewed status with patient and her , history of multiple sclerosis, non-Hodgkin's lymphoma, adult-onset diabetes, hyperlipidemia, obesity.  Currently using Aquaphor on the skin of her legs bilaterally, chronic erythema.  She has undergone a trial of simple pneumatic compression device which is failed to improve her symptoms based on persistence of significant bilateral remedy edema, lymphedema, hypertrophic skin changes, persistent stem her toe.  Given the failure of simple pump trial, advance pneumatic compression treatment with a flexi touch plus indicated.  Paperwork submitted.    Patient Active Problem List   Diagnosis     MS (multiple sclerosis) (H)     Non Hodgkin's lymphoma (H)     Leukocytosis     Gallstone     Hypertension goal BP (blood pressure) < 140/90     Spinal stenosis, lumbar     Abnormality of gait     Pain medication agreement- signed Nov 20,2012     Lumbar radiculopathy     Colon polyps     Neuralgia, neuritis, and radiculitis, unspecified     Encounter for long-term current use of medication     Health Care Home     Moderate depressive episode (H)     Leg muscle spasm     Chronic pain syndrome     Controlled substance agreement signed     T-cell lymphoma (H)     Type 2 diabetes, HbA1c goal < 7% (H)     Hyperlipidemia LDL goal <70     CKD (chronic kidney disease) stage 1, GFR 90 ml/min or greater     Type 2 diabetes mellitus with stage 1 chronic kidney disease, without long-term current use of insulin (H)     Morbid obesity (H)     Acute hypercapnic respiratory failure (H)     Sepsis (H)     S/P right hip fracture     Lymphedema     Past Medical History:   Diagnosis Date     Abnormality of gait 7/27/2012     Arrhythmia      with sepsis     Chronic pain     [facility] - yearly, next in summer 2018     CKD (chronic kidney disease) stage 1, GFR 90 ml/min or greater     kidney stones     Colon polyps 1/15    tubular adenomas x 2     Gallstone 6/11/2012     Hyperlipidemia LDL goal <70      Hypertension goal BP (blood pressure) < 140/90      Leukocytosis 6/11/2012     Moderate depressive episode (H)      MS (multiple sclerosis) (H) 2003    Dr Vigil/Gaby - [facility]     Multiple sclerosis (H)      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     Tobacco abuse 06/11/2012    former     Type 2 diabetes, HbA1c goal < 7% (H)      Exam:  /75   Pulse 87   Temp 97  F (36.1  C) (Temporal)   Resp 16   LMP 12/11/2011   Wound (used by OP [facility] only) 07/09/19 1527 Left coccyx pressure injury (Active)   Length (cm) 1 9/11/2019  3:00 PM   Width (cm) 0.3 9/11/2019  3:00 PM   Depth (cm) 2.7 9/11/2019  3:00 PM   Wound (cm^2) 0.3 cm^2 9/11/2019  3:00 PM   Wound Volume (cm^3) 0.81 cm^3 9/11/2019  3:00 PM   Wound healing % 90.91 9/11/2019  3:00 PM   Tunneling [Depth (cm)/Location] 11oclock/depth 2.3 9/11/2019  3:00 PM   Dressing Appearance moist drainage 9/11/2019  3:00 PM   Drainage Characteristics/Odor creamy;serosanguineous 9/11/2019  3:00 PM    Drainage Amount moderate 9/11/2019  3:00 PM     On physical examination patient is alert, conversant, oriented x3, in left lateral decubitus position.  Sacral decubitus evaluated, undermining approximately 1 cm, no foul odor, no debridement performed, endoform dressing replaced after irrigation.  Examination lower extremities reveals intact dorsalis pedis pulses, chronic circumferential erythematous skin changes, hypertrophic skin, stemmer toe bilaterally, mild buffalo hump of feet bilaterally.  She has edema at her hips and lower abdomen consistent with chronic proximal lymphedema which has not been cleared with utilization of a simple pump.        Impression: Primary lymphedema tarda, failed trial of simple pneumatic compression device, abdominal and hip edema, indication for treatment with flexi touch advanced pneumatic compression device.  Discontinue Aquaphor as may be contributing to erythema of legs.  Transition to a ceramide-containing lotion such as Curel.  Brown diosmin flavanoid 1 tablet daily for management of lower externally edema.  Chrsi 1 pack daily for nutritional supplementation.  Wash legs on a daily basis with mild soap use of Velcro compression devices during the day, use of edema wear base of toes to high thighs during the night.  Follow-up in approximately 2 months for reevaluation.    Plan: We will dress the wounds with endoform to sacral wound.  Patient will return to the clinic in 8 weeks time    Robert Eugene MD on 9/11/2019 at 3:24 PM

## 2019-09-11 NOTE — DISCHARGE INSTRUCTIONS
[facility]  [address] [facility] [address], [place] [address]  Appointment Phone [phone] Nurse Advisors [phone]    Amanda Richardson      1965  Please add in 1 packet of Chris Supplement TWICE a day until your next appointment   [facility] [place] Cardiovascular Support Blood Health Vascular 1 tablet twice daily     [facility] Phone:[phone] Fax:[phone]  Wound Dressing Change to coccyx  Cleanse inside wound with saline or wound cleanser  Skin Care: Use Skin Prep to romeo-wound skin  Apply Endoform into wound, cover with Mepilex border  Change dressing Monday, Wednesday and Fridy  Wash legs with soap and water daily, apply Curel lotion right after bathing.  Compression:   You have a compression lymphedema velcro wraps is supposed to be on during the day and wear EDEMAWEAR at night  Please remove compression dressing if toes turn blue and/or tingle and can not be relieved by raising the leg for one hour.        ARTHUR Eugene M.D.. September 11, 2019    Call us at [phone] if you have any questions about your wounds, have redness or swelling around your wound, have a fever of 101 or greater or if you have any other problems or concerns. We answer the phone Monday through Friday 8 am to 4 pm, please leave a message as we check the voicemail frequently throughout the day.     Follow up with Provider - 4 weeks   Please order Edema Wear from [employer]

## 2019-09-18 ENCOUNTER — MEDICAL CORRESPONDENCE (OUTPATIENT)
Dept: HEALTH INFORMATION MANAGEMENT | Facility: CLINIC | Age: 54
End: 2019-09-18

## 2019-09-18 ENCOUNTER — TELEPHONE (OUTPATIENT)
Dept: FAMILY MEDICINE | Facility: CLINIC | Age: 54
End: 2019-09-18

## 2019-09-18 NOTE — TELEPHONE ENCOUNTER
Completed forms faxed back to [facility] at [phone].   Originals sent to be scanned.     Katharina Goodwin

## 2019-09-18 NOTE — TELEPHONE ENCOUNTER
Date Forms was received: September 18, 2019    Forms received by: Fax    Purpose of Form:   orders - [facility]    How the form needs to be returned for patient:  Fax    Form currently placed  [employer]

## 2019-09-19 ENCOUNTER — MEDICAL CORRESPONDENCE (OUTPATIENT)
Dept: HEALTH INFORMATION MANAGEMENT | Facility: CLINIC | Age: 54
End: 2019-09-19

## 2019-09-25 ENCOUNTER — MYC REFILL (OUTPATIENT)
Dept: FAMILY MEDICINE | Facility: CLINIC | Age: 54
End: 2019-09-25

## 2019-09-25 DIAGNOSIS — G89.4 CHRONIC PAIN SYNDROME: ICD-10-CM

## 2019-09-25 DIAGNOSIS — G35 MS (MULTIPLE SCLEROSIS) (H): ICD-10-CM

## 2019-09-26 NOTE — TELEPHONE ENCOUNTER
Controlled Substance Refill Request for oxyCODONE IR (ROXICODONE) 15 MG tablet  Problem List Complete:    Yes    Last Written Prescription Date:  8.31.19  Last Fill Quantity: 120 tablet,   # refills: 0      Last Office Visit with [facility] primary care provider: 7.29.19    Future Office visit:   Next 5 appointments (look out 90 days)    Oct 30, 2019  4:00 PM CDT  SHORT with Julius Hassan MD  [facility] ([facility]) [address] [place] [id number]  [phone]   Nov 06, 2019  3:15 PM CST  Return Visit with Robert Eugene MD  [facility] [place] ([facility]) [address] [place] [address]  [phone]          Controlled substance agreement:   Encounter-Level CSA - 02/12/2018:    Controlled Substance Agreement - Scan on 2/28/2018 10:12 AM: CONTROLLED SUBSTANCE AGREEMENT     Encounter-Level CSA - 06/09/2016:    Controlled Substance Agreement - Scan on 6/17/2016 10:49 AM: CONTROLLED SUBSTANCE AGREEMENT     Encounter-Level CSA - 04/03/2015:    Controlled Substance Agreement - Scan on 4/12/2015 10:28 AM: Controlled Medication Agreement 04/03/15     Patient-Level CSA:    There are no patient-level csa.         Last Urine Drug Screen:   Pain Drug SCR UR W RPTD Meds   Date Value Ref Range Status   06/20/2018 FINAL  Final     Comment:     (Note)  ====================================================================  TOXASSURE COMP DRUG ANALYSIS,UR  ====================================================================  Test                             Result       Flag       Units        Drug Present and Declared for Prescription Verification   Oxycodone                      1698         EXPECTED   ng/mg creat   Oxymorphone                    3971         EXPECTED   ng/mg creat   Noroxycodone                   4522         EXPECTED   ng/mg creat   Noroxymorphone                 1802         EXPECTED   ng/mg creat     Sources of oxycodone are scheduled prescription medications.    Oxymorphone, noroxycodone, and noroxymorphone are expected    metabolites of oxycodone. Oxymorphone is also available as a    scheduled prescription medication.   Gabapentin                     PRESENT      EXPECTED                 Baclofen                       PRESENT      EXPECTED                 Amitriptyline                  PRESENT      EXPECTED                 Nortriptyline                  PRESENT      EXPECTED                  Nortriptyline is an expected metabolite of amitriptyline.   Duloxetine                     PRESENT      EXPECTED                Drug Present not Declared for Prescription Verification   Metoprolol                     PRESENT      UNEXPECTED              ====================================================================  Test                      Result    Flag   Units      Ref Range        Creatinine              65               mg/dL      >=20            ====================================================================  Declared Medications:  The flagging and interpretation on this report are based on the  following declared medications.  Unexpected results may arise from  inaccuracies in the declared medications.  **Note: The testing scope of this panel includes these medications:  Amitriptyline (Elavil)  Baclofen  Duloxetine (Cymbalta)  Gabapentin  Oxycodone  ====================================================================  For clinical consultation, please call [phone].  ====================================================================  Analysis performed by [employer] [place] [address]     , No results found for: COMDAT, No results found for: THC13, PCP13, COC13, MAMP13, OPI13, AMP13, BZO13, TCA13, MTD13, BAR13, OXY13, PPX13, BUP13     Processing:  Fax Rx to listed pharmacy    [email or web address]   checked in past 3 months?  No, route to RN

## 2019-09-27 ENCOUNTER — MYC MEDICAL ADVICE (OUTPATIENT)
Dept: FAMILY MEDICINE | Facility: CLINIC | Age: 54
End: 2019-09-27

## 2019-09-27 RX ORDER — OXYCODONE HYDROCHLORIDE 15 MG/1
15 TABLET ORAL EVERY 6 HOURS PRN
Qty: 120 TABLET | Refills: 0 | Status: SHIPPED | OUTPATIENT
Start: 2019-09-30 | End: 2019-10-27

## 2019-09-27 NOTE — TELEPHONE ENCOUNTER
09/27/2019 [place] Encounter:   Dr. Hassan,  I have requested a refill of my oxycodone medication. My current prescription will run out on Sunday so I was hoping Fernando could  the new prescription on Friday while the Clinic is open.    Thank you for your assistance,    Amanda Sanon Response sent    Ivanna Kelly RN  [place] Triage

## 2019-09-27 NOTE — TELEPHONE ENCOUNTER
Due 09/30/2019  Rx post-dated    Routing refill request to provider for review/approval because:  Drug not on the [facility] refill protocol         Ivanna Kelly RN  [facility]

## 2019-09-30 ENCOUNTER — TELEPHONE (OUTPATIENT)
Dept: FAMILY MEDICINE | Facility: CLINIC | Age: 54
End: 2019-09-30

## 2019-09-30 DIAGNOSIS — Z53.9 DIAGNOSIS NOT YET DEFINED: Primary | ICD-10-CM

## 2019-09-30 PROCEDURE — G0179 MD RECERTIFICATION HHA PT: HCPCS | Performed by: FAMILY MEDICINE

## 2019-09-30 NOTE — TELEPHONE ENCOUNTER
Date Forms was received: September 30, 2019    Forms received by: Fax    Purpose of Form:  [facility] forms    How the form needs to be returned for patient:  Fax    Form currently placed  [employer] File

## 2019-09-30 NOTE — TELEPHONE ENCOUNTER
Completed forms faxed back to [facility] at [phone].   Originals sent to be scanned.     Katharina Goodwin

## 2019-10-02 ENCOUNTER — MYC MEDICAL ADVICE (OUTPATIENT)
Dept: FAMILY MEDICINE | Facility: CLINIC | Age: 54
End: 2019-10-02

## 2019-10-02 DIAGNOSIS — J18.9 PNEUMONIA DUE TO INFECTIOUS ORGANISM, UNSPECIFIED LATERALITY, UNSPECIFIED PART OF LUNG: ICD-10-CM

## 2019-10-03 RX ORDER — ALBUTEROL SULFATE 0.83 MG/ML
2.5 SOLUTION RESPIRATORY (INHALATION) ONCE
Qty: 25 VIAL | Refills: 1 | Status: SHIPPED | OUTPATIENT
Start: 2019-10-03 | End: 2019-10-14

## 2019-10-03 NOTE — TELEPHONE ENCOUNTER
Rx is historical - hasn't been prescribed since 2017    MyChart Message sent  Awaiting response    Ivanna Kelly RN  [place] Triage

## 2019-10-03 NOTE — TELEPHONE ENCOUNTER
Noted.     Routing refill request to provider for review/approval because:  Medication is reported/historical        Ivanna Kelly RN  [place] Triage

## 2019-10-14 ENCOUNTER — MYC REFILL (OUTPATIENT)
Dept: FAMILY MEDICINE | Facility: CLINIC | Age: 54
End: 2019-10-14

## 2019-10-14 DIAGNOSIS — M54.16 LUMBAR RADICULOPATHY: ICD-10-CM

## 2019-10-14 DIAGNOSIS — F32.A MODERATE DEPRESSIVE EPISODE: ICD-10-CM

## 2019-10-14 DIAGNOSIS — J18.9 PNEUMONIA DUE TO INFECTIOUS ORGANISM, UNSPECIFIED LATERALITY, UNSPECIFIED PART OF LUNG: ICD-10-CM

## 2019-10-14 DIAGNOSIS — G89.4 CHRONIC PAIN SYNDROME: ICD-10-CM

## 2019-10-14 DIAGNOSIS — E78.5 HYPERLIPIDEMIA LDL GOAL <70: ICD-10-CM

## 2019-10-14 DIAGNOSIS — G35 MS (MULTIPLE SCLEROSIS) (H): ICD-10-CM

## 2019-10-14 RX ORDER — ALBUTEROL SULFATE 0.83 MG/ML
2.5 SOLUTION RESPIRATORY (INHALATION) ONCE
Qty: 25 VIAL | Refills: 1 | Status: SHIPPED | OUTPATIENT
Start: 2019-10-14 | End: 2019-10-24

## 2019-10-14 RX ORDER — AMITRIPTYLINE HYDROCHLORIDE 100 MG/1
100 TABLET ORAL AT BEDTIME
Qty: 90 TABLET | Refills: 0 | Status: SHIPPED | OUTPATIENT
Start: 2019-10-14 | End: 2019-10-27

## 2019-10-14 RX ORDER — ATORVASTATIN CALCIUM 10 MG/1
10 TABLET, FILM COATED ORAL DAILY
Qty: 90 TABLET | Refills: 0 | Status: SHIPPED | OUTPATIENT
Start: 2019-10-14 | End: 2020-01-10

## 2019-10-14 NOTE — TELEPHONE ENCOUNTER
"Requested Prescriptions   Pending Prescriptions Disp Refills     amitriptyline (ELAVIL) 100 MG tablet      Last Written Prescription Date:  2.12.18  Last Fill Quantity: 90 tablet,  # refills: 3   Last office visit: 7/29/2019 with prescribing provider:  Julius Hassan MD             Future Office Visit:   Next 5 appointments (look out 90 days)    Oct 30, 2019  4:00 PM CDT  SHORT with Julius Hassan MD  [facility] [address] [place] [id number] [phone]   Nov 06, 2019  3:15 PM CST  Return Visit with Robert Eugene MD  [facility] [place] ([facility]) [address] [facility] [address]  [facility] [address] [phone]            90 tablet 3     Sig: Take 1 tablet (100 mg) by mouth At Bedtime       Tricyclic Agents ( Annual appt and no PHQ9) Passed - 10/14/2019  2:42 PM        Passed - Blood Pressure under 140/90 in past 12 mos     BP Readings from Last 3 Encounters:   09/11/19 126/75   08/21/19 (!) 144/81   07/30/19 (!) 144/65                 Passed - Recent (12 mo) or future (30 days) visit within authorizing provider's specialty     Patient has had an office visit with the authorizing provider or a provider within the authorizing providers department within the previous 12 mos or has a future within next 30 days. See \"Patient Info\" tab in inbasket, or \"Choose Columns\" in Meds & Orders section of the refill encounter.              Passed - Medication is active on med list        Passed - Patient is age 18 or older        Passed - Patient is not pregnant        Passed - No positive pregnancy test on record in past 12 mos        atorvastatin (LIPITOR) 10 MG tablet          Last Written Prescription Date:  7.16.18  Last Fill Quantity: 90 tablet,  # refills: 3   Last office visit: 7/29/2019 with prescribing provider:  Julius Hassan MD               Future Office Visit:   Next 5 appointments (look out 90 days)    Oct " "30, 2019  4:00 PM CDT  SHORT with Julius Hassan MD  [facility] [address] [place] [id number]  [phone]   Nov 06, 2019  3:15 PM CST  Return Visit with Robert Eugene MD  [facility] ([facility]) [address]  [place] [address] [phone]            90 tablet 3     Sig: Take 1 tablet (10 mg) by mouth daily       Statins Protocol Passed - 10/14/2019  2:42 PM        Passed - LDL on file in past 12 months     Recent Labs   Lab Test 08/03/19  0930   LDL 71             Passed - No abnormal creatine kinase in past 12 months     Recent Labs   Lab Test 08/03/19  0930   CKT 37                Passed - Recent (12 mo) or future (30 days) visit within the authorizing provider's specialty     Patient has had an office visit with the authorizing provider or a provider within the authorizing providers department within the previous 12 mos or has a future within next 30 days. See \"Patient Info\" tab in inbasket, or \"Choose Columns\" in Meds & Orders section of the refill encounter.              Passed - Medication is active on med list        Passed - Patient is age 18 or older        Passed - No active pregnancy on record        Passed - No positive pregnancy test in past 12 months        albuterol (PROVENTIL) (2.5 MG/3ML) 0.083% neb solution              Last Written Prescription Date:  10.3.19  Last Fill Quantity: 25 vial,  # refills: 1   Last office visit: 7/29/2019 with prescribing provider:  Julius Hassan MD                   Future Office Visit:   Next 5 appointments (look out 90 days)    Oct 30, 2019  4:00 PM CDT  SHORT with Julius Hassan MD  [facility] [address] [place] [id number] [phone]   Nov 06, 2019  3:15 PM CST  Return Visit with Robert Eugene MD  [facility]" "[facility]) [address]  [address]  [facility] [address]  [phone]            25 vial 1     Sig: Take 1 vial (2.5 mg) by nebulization once for 1 dose       Asthma Maintenance Inhalers - Anticholinergics Passed - 10/14/2019  2:42 PM       No flowsheet data found.         Passed - Patient is age 12 years or older        Passed - Recent (12 mo) or future (30 days) visit within the authorizing provider's specialty     Patient has had an office visit with the authorizing provider or a provider within the authorizing providers department within the previous 12 mos or has a future within next 30 days. See \"Patient Info\" tab in inbasket, or \"Choose Columns\" in Meds & Orders section of the refill encounter.              Passed - Medication is active on med list        "

## 2019-10-21 ENCOUNTER — TELEPHONE (OUTPATIENT)
Dept: FAMILY MEDICINE | Facility: CLINIC | Age: 54
End: 2019-10-21

## 2019-10-21 DIAGNOSIS — G35 MS (MULTIPLE SCLEROSIS) (H): ICD-10-CM

## 2019-10-21 DIAGNOSIS — G89.4 CHRONIC PAIN SYNDROME: Primary | ICD-10-CM

## 2019-10-21 DIAGNOSIS — M79.605 CHRONIC PAIN OF BOTH LOWER EXTREMITIES: ICD-10-CM

## 2019-10-21 DIAGNOSIS — M79.604 CHRONIC PAIN OF BOTH LOWER EXTREMITIES: ICD-10-CM

## 2019-10-21 DIAGNOSIS — G89.29 CHRONIC PAIN OF BOTH LOWER EXTREMITIES: ICD-10-CM

## 2019-10-21 NOTE — TELEPHONE ENCOUNTER
Reason for call:  Form   Our goal is to have forms completed within 72 hours, however some forms may require a visit or additional information.     Who is the form from? Home Health (if other please explain)  Where did the form come from? form was faxed in  What clinic location was the form placed at? [facility]  Where was the form placed? Given to FREDO Cancino for med rec

## 2019-10-21 NOTE — TELEPHONE ENCOUNTER
MED RECONCILIATION DONE    Discrepancies:   -losartan-hydrochlorothiazide (HYZAAR) 100-25 MG tablet  (Taking-per form-12.5 hydrochlorothiazide and 50 mg Losartan PO daily)            Medications on Epic but not Form:   -  acetaminophen (TYLENOL) 325 MG tablet   4/17/2019  No   Sig - Route: Take 2 tablets (650 mg) by mouth every 4 hours as needed for mild pain - Oral     metFORMIN (GLUCOPHAGE) 500 MG tablet 90 tablet 3 7/16/2018  --   Sig - Route: Take 1 tablet (500 mg) by mouth daily (with dinner) - Oral     naloxone (NARCAN) 4 MG/0.1ML nasal spray 0.2 mL 0 7/29/2019  --   Sig - Route: Spray 1 spray (4 mg) into one nostril alternating nostrils once as needed for opioid reversal Every 2-3 minutes until patient responsive or EMS arrives - Alternating Nostrils           Medications on Form but not Epic:   -Aspirin 81 mg PO daily  -Cholecalciferol oral Cap 60 mg PO daily  -Cymbalta oral cap 60mg XR PO BID with food  -Dantrolene sodium cap 25 mg PO BID  -Fluticasone Propionate nasal suspension 50mcg/act Intranasal, Once a day spray 1 spray in each nostril daily in AM            Form currently on [facility] Desk in Blue RN Folder  Routing to PCP for further review/recommendations/orders.    Anival Goodwin RN  [facility]

## 2019-10-22 DIAGNOSIS — I10 HYPERTENSION GOAL BP (BLOOD PRESSURE) < 140/90: Primary | ICD-10-CM

## 2019-10-22 DIAGNOSIS — Z53.9 DIAGNOSIS NOT YET DEFINED: Primary | ICD-10-CM

## 2019-10-22 PROCEDURE — G0179 MD RECERTIFICATION HHA PT: HCPCS | Performed by: FAMILY MEDICINE

## 2019-10-22 RX ORDER — ASPIRIN 81 MG/1
162 TABLET, CHEWABLE ORAL EVERY MORNING
COMMUNITY
Start: 2019-10-22

## 2019-10-22 RX ORDER — HYDROCHLOROTHIAZIDE 12.5 MG/1
25 TABLET ORAL DAILY
COMMUNITY
Start: 2019-10-22 | End: 2019-10-30

## 2019-10-22 RX ORDER — DULOXETIN HYDROCHLORIDE 60 MG/1
60 CAPSULE, DELAYED RELEASE ORAL DAILY
COMMUNITY
Start: 2019-10-22 | End: 2019-10-27

## 2019-10-22 RX ORDER — LOSARTAN POTASSIUM 50 MG/1
50 TABLET ORAL DAILY
COMMUNITY
Start: 2019-10-22 | End: 2019-10-30

## 2019-10-22 NOTE — PROGRESS NOTES
Forms completed and med list updated. Forms faxed back to [phone]    Anival Goodwin RN   [place] [facility]

## 2019-10-22 NOTE — TELEPHONE ENCOUNTER
Writer called out to [facility] (Elizabeth Montelongo) to go over med list. Med rec with Pt was confirmed to include Aspirin, Vit-D, Cymbalta. There was a discrepency with the Losartan hydrochlorothiazide. [facility] list reflected 50mg Losartan and 12.5 mg hydrochlorothiazide. Epic noted to have 100mg Losartan and 25 mg hydrochlorothiazide. Pt noted her BP has been on the higher side 140/80's.     Awaiting a call back from a nurse working with Pt.    Anival Goodwin RN   [facility]

## 2019-10-23 ENCOUNTER — MEDICAL CORRESPONDENCE (OUTPATIENT)
Dept: HEALTH INFORMATION MANAGEMENT | Facility: CLINIC | Age: 54
End: 2019-10-23

## 2019-10-23 NOTE — TELEPHONE ENCOUNTER
Spoke with Nurse for [facility], Diamante. It was noted the Losartan and hydrochlorothiazide was decreased back in April of 2019. BP has not been a concern at this time. Will continue to monitor.    Forms completed and med list updated. Forms faxed back to [phone]    Anival Goodwin RN   [place] [facility]

## 2019-10-24 ENCOUNTER — MYC REFILL (OUTPATIENT)
Dept: FAMILY MEDICINE | Facility: CLINIC | Age: 54
End: 2019-10-24

## 2019-10-24 DIAGNOSIS — J18.9 PNEUMONIA DUE TO INFECTIOUS ORGANISM, UNSPECIFIED LATERALITY, UNSPECIFIED PART OF LUNG: ICD-10-CM

## 2019-10-24 RX ORDER — ALBUTEROL SULFATE 0.83 MG/ML
2.5 SOLUTION RESPIRATORY (INHALATION) ONCE
Qty: 25 VIAL | Refills: 1 | Status: SHIPPED | OUTPATIENT
Start: 2019-10-24 | End: 2020-05-20

## 2019-10-24 NOTE — TELEPHONE ENCOUNTER
"Requested Prescriptions   Pending Prescriptions Disp Refills     albuterol (PROVENTIL) (2.5 MG/3ML) 0.083% neb solution 25 vial 1     Sig: Take 1 vial (2.5 mg) by nebulization once for 1 dose       Last Refill:    Disp Refills Start End AISHA   albuterol (PROVENTIL) (2.5 MG/3ML) 0.083% neb solution 25 vial 1 10/14/2019 10/14/2019 No   Sig - Route: Take 1 vial (2.5 mg) by nebulization once for 1 dose - Nebulization     Asthma Maintenance Inhalers - Anticholinergics Passed - 10/24/2019  1:04 PM        Passed - Patient is age 12 years or older        Passed - Recent (12 mo) or future (30 days) visit within the authorizing provider's specialty     Patient has had an office visit with the authorizing provider or a provider within the authorizing providers department within the previous 12 mos or has a future within next 30 days. See \"Patient Info\" tab in inbasket, or \"Choose Columns\" in Meds & Orders section of the refill encounter.      LOV: 07/29/2019          Passed - Medication is active on med list        Refilled per RN Protocol.     Ivanna Kelly RN  [place] [facility]    "

## 2019-10-27 ENCOUNTER — MYC REFILL (OUTPATIENT)
Dept: FAMILY MEDICINE | Facility: CLINIC | Age: 54
End: 2019-10-27

## 2019-10-27 DIAGNOSIS — G35 MS (MULTIPLE SCLEROSIS) (H): ICD-10-CM

## 2019-10-27 DIAGNOSIS — F32.A MODERATE DEPRESSIVE EPISODE: ICD-10-CM

## 2019-10-27 DIAGNOSIS — M54.16 LUMBAR RADICULOPATHY: ICD-10-CM

## 2019-10-27 DIAGNOSIS — G89.4 CHRONIC PAIN SYNDROME: ICD-10-CM

## 2019-10-28 NOTE — TELEPHONE ENCOUNTER
Amanda is calling for an update on this medication refill. Informed of 72 hour policy. Please give her a call back with an update when possible. Thank you!

## 2019-10-28 NOTE — TELEPHONE ENCOUNTER
Requested Prescriptions   Pending Prescriptions Disp Refills     oxyCODONE IR (ROXICODONE) 15 MG tablet        Controlled Substance Refill Request for   Problem List Complete:    Yes    Last Written Prescription Date:  9.30.19  Last Fill Quantity: 120 tablet,   # refills: 0    Last Office Visit with [facility] primary care provider: 7.29.19    Future Office visit:   Next 5 appointments (look out 90 days)    Oct 30, 2019  4:00 PM CDT  SHORT with Julius Hassan MD  [facility]) [address] [place] [address]  [phone]   Nov 06, 2019  3:15 PM CST  Return Visit with Robert Eugene MD  [facility] ([facility]) [address] [place] [address]  [phone]          Controlled substance agreement:   Encounter-Level CSA - 02/12/2018:    Controlled Substance Agreement - Scan on 2/28/2018 10:12 AM: CONTROLLED SUBSTANCE AGREEMENT     Encounter-Level CSA - 06/09/2016:    Controlled Substance Agreement - Scan on 6/17/2016 10:49 AM: CONTROLLED SUBSTANCE AGREEMENT     Encounter-Level CSA - 04/03/2015:    Controlled Substance Agreement - Scan on 4/12/2015 10:28 AM: Controlled Medication Agreement 04/03/15     Patient-Level CSA:    There are no patient-level csa.         Last Urine Drug Screen:   Pain Drug SCR UR W RPTD Meds   Date Value Ref Range Status   06/20/2018 FINAL  Final     Comment:     (Note)  ====================================================================  [facility] COMP DRUG ANALYSIS,UR  ====================================================================  Test                             Result       Flag       Units        Drug Present and Declared for Prescription Verification   Oxycodone                      1698         EXPECTED   ng/mg creat   Oxymorphone                    3971         EXPECTED   ng/mg creat   Noroxycodone                   4522         EXPECTED   ng/mg creat    Noroxymorphone                 1802         EXPECTED   ng/mg creat    Sources of oxycodone are scheduled prescription medications.    Oxymorphone, noroxycodone, and noroxymorphone are expected    metabolites of oxycodone. Oxymorphone is also available as a    scheduled prescription medication.   Gabapentin                     PRESENT      EXPECTED                 Baclofen                       PRESENT      EXPECTED                 Amitriptyline                  PRESENT      EXPECTED                 Nortriptyline                  PRESENT      EXPECTED                  Nortriptyline is an expected metabolite of amitriptyline.   Duloxetine                     PRESENT      EXPECTED                Drug Present not Declared for Prescription Verification   Metoprolol                     PRESENT      UNEXPECTED              ====================================================================  Test                      Result    Flag   Units      Ref Range        Creatinine              65               mg/dL      >=20            ====================================================================  Declared Medications:  The flagging and interpretation on this report are based on the  following declared medications.  Unexpected results may arise from  inaccuracies in the declared medications.  **Note: The testing scope of this panel includes these medications:  Amitriptyline (Elavil)  Baclofen  Duloxetine (Cymbalta)  Gabapentin  Oxycodone  ====================================================================  For clinical consultation, please call [phone].  ====================================================================  Analysis performed by [employer], [employer] [place] [address]     , No results found for: COMDAT, No results found for: THC13, PCP13, COC13, MAMP13, OPI13, AMP13, BZO13, TCA13, MTD13, BAR13, OXY13, PPX13, BUP13     Processing:  Fax Rx to listed  "pharmacy    [email or web address]   checked in past 3 months?  No, route to RN       120 tablet 0     Sig: Take 1 tablet (15 mg) by mouth every 6 hours as needed for moderate to severe pain Limit 4 tablet(s) per day.       There is no refill protocol information for this order        amitriptyline (ELAVIL) 100 MG tablet    Given 10.14.19   90 tablet       90 tablet 0     Sig: Take 1 tablet (100 mg) by mouth At Bedtime       Tricyclic Agents ( Annual appt and no PHQ9) Passed - 10/27/2019  1:51 PM        Passed - Blood Pressure under 140/90 in past 12 mos     BP Readings from Last 3 Encounters:   09/11/19 126/75   08/21/19 (!) 144/81   07/30/19 (!) 144/65                 Passed - Recent (12 mo) or future (30 days) visit within authorizing provider's specialty     Patient has had an office visit with the authorizing provider or a provider within the authorizing providers department within the previous 12 mos or has a future within next 30 days. See \"Patient Info\" tab in inbasket, or \"Choose Columns\" in Meds & Orders section of the refill encounter.              Passed - Medication is active on med list        Passed - Patient is age 18 or older        Passed - Patient is not pregnant        Passed - No positive pregnancy test on record in past 12 mos        DULoxetine (CYMBALTA) 60 MG capsule            Last Written Prescription Date:  na  Last Fill Quantity: na,  # refills: na   Last office visit: 7/29/2019 with prescribing provider:  Julius Hassan MD                 Future Office Visit:   Next 5 appointments (look out 90 days)    Oct 30, 2019  4:00 PM CDT  SHORT with Julius Hassan MD  [facility]) [address] [place] [id number] [phone]   Nov 06, 2019  3:15 PM CST  Return Visit with Robert Eugene MD  [facility] [place] ([facility]) [address] " "[address] [phone]                  Sig: Take 1 capsule (60 mg) by mouth daily       Serotonin-Norepinephrine Reuptake Inhibitors  Failed - 10/27/2019  1:51 PM        Failed - PHQ-9 score of less than 5 in past 6 months     Please review last PHQ-9 score.           Passed - Blood pressure under 140/90 in past 12 months     BP Readings from Last 3 Encounters:   09/11/19 126/75   08/21/19 (!) 144/81   07/30/19 (!) 144/65                 Passed - Medication is active on med list        Passed - Patient is age 18 or older        Passed - No active pregnancy on record        Passed - No positive pregnancy test in past 12 months        Passed - Recent (6 mo) or future (30 days) visit within the authorizing provider's specialty     Patient had office visit in the last 6 months or has a visit in the next 30 days with authorizing provider or within the authorizing provider's specialty.  See \"Patient Info\" tab in inbasket, or \"Choose Columns\" in Meds & Orders section of the refill encounter.            "

## 2019-10-29 RX ORDER — OXYCODONE HYDROCHLORIDE 15 MG/1
15 TABLET ORAL EVERY 6 HOURS PRN
Qty: 120 TABLET | Refills: 0 | Status: SHIPPED | OUTPATIENT
Start: 2019-10-30 | End: 2019-11-26

## 2019-10-29 RX ORDER — AMITRIPTYLINE HYDROCHLORIDE 100 MG/1
100 TABLET ORAL AT BEDTIME
Qty: 90 TABLET | Refills: 3 | Status: SHIPPED | OUTPATIENT
Start: 2019-10-29 | End: 2021-01-01

## 2019-10-29 RX ORDER — DULOXETIN HYDROCHLORIDE 60 MG/1
60 CAPSULE, DELAYED RELEASE ORAL DAILY
Qty: 90 CAPSULE | Refills: 3 | Status: SHIPPED | OUTPATIENT
Start: 2019-10-29 | End: 2019-10-30

## 2019-10-29 NOTE — TELEPHONE ENCOUNTER
Reason for Call:  Other prescription    Detailed comments: The patient is calling with a question about her oxycodone and her duloxetine. She would like to speak with a nurse.    Phone Number Patient can be reached at: Cell number on file:    Telephone Information:   Mobile [phone]     Best Time: Anytime    Can we leave a detailed message on this number? YES    Call taken on 10/29/2019 at 12:09 PM by Tiffany Wood

## 2019-10-30 ENCOUNTER — OFFICE VISIT (OUTPATIENT)
Dept: FAMILY MEDICINE | Facility: CLINIC | Age: 54
End: 2019-10-30
Payer: COMMERCIAL

## 2019-10-30 VITALS
HEART RATE: 81 BPM | SYSTOLIC BLOOD PRESSURE: 126 MMHG | TEMPERATURE: 98.1 F | OXYGEN SATURATION: 92 % | HEIGHT: 67 IN | DIASTOLIC BLOOD PRESSURE: 74 MMHG | BODY MASS INDEX: 39.47 KG/M2

## 2019-10-30 DIAGNOSIS — G35 MS (MULTIPLE SCLEROSIS) (H): Primary | ICD-10-CM

## 2019-10-30 DIAGNOSIS — M54.16 LUMBAR RADICULOPATHY: ICD-10-CM

## 2019-10-30 DIAGNOSIS — N18.1 TYPE 2 DIABETES MELLITUS WITH STAGE 1 CHRONIC KIDNEY DISEASE, WITHOUT LONG-TERM CURRENT USE OF INSULIN (H): ICD-10-CM

## 2019-10-30 DIAGNOSIS — E78.5 HYPERLIPIDEMIA LDL GOAL <70: ICD-10-CM

## 2019-10-30 DIAGNOSIS — G89.4 CHRONIC PAIN SYNDROME: ICD-10-CM

## 2019-10-30 DIAGNOSIS — E11.22 TYPE 2 DIABETES MELLITUS WITH STAGE 1 CHRONIC KIDNEY DISEASE, WITHOUT LONG-TERM CURRENT USE OF INSULIN (H): ICD-10-CM

## 2019-10-30 DIAGNOSIS — I10 HYPERTENSION GOAL BP (BLOOD PRESSURE) < 140/90: ICD-10-CM

## 2019-10-30 DIAGNOSIS — N18.1 CKD (CHRONIC KIDNEY DISEASE) STAGE 1, GFR 90 ML/MIN OR GREATER: ICD-10-CM

## 2019-10-30 DIAGNOSIS — E66.01 MORBID OBESITY (H): ICD-10-CM

## 2019-10-30 DIAGNOSIS — C85.81 OTHER SPECIFIED TYPE OF NON-HODGKIN LYMPHOMA OF HEAD (H): ICD-10-CM

## 2019-10-30 DIAGNOSIS — E11.9 TYPE 2 DIABETES, HBA1C GOAL < 7% (H): ICD-10-CM

## 2019-10-30 DIAGNOSIS — Z51.81 MEDICATION MONITORING ENCOUNTER: ICD-10-CM

## 2019-10-30 DIAGNOSIS — M48.061 SPINAL STENOSIS OF LUMBAR REGION, UNSPECIFIED WHETHER NEUROGENIC CLAUDICATION PRESENT: ICD-10-CM

## 2019-10-30 PROCEDURE — 99214 OFFICE O/P EST MOD 30 MIN: CPT | Performed by: FAMILY MEDICINE

## 2019-10-30 RX ORDER — DULOXETIN HYDROCHLORIDE 60 MG/1
60 CAPSULE, DELAYED RELEASE ORAL 2 TIMES DAILY
Qty: 180 CAPSULE | Refills: 3 | Status: SHIPPED | OUTPATIENT
Start: 2019-10-30 | End: 2020-12-18

## 2019-10-30 RX ORDER — LOSARTAN POTASSIUM AND HYDROCHLOROTHIAZIDE 12.5; 5 MG/1; MG/1
1 TABLET ORAL DAILY
Qty: 90 TABLET | Refills: 3 | Status: SHIPPED | OUTPATIENT
Start: 2019-10-30 | End: 2020-10-23

## 2019-10-30 NOTE — PROGRESS NOTES
SUBJECTIVE:                                                      Amanda Richardson is a 54 year old female who presents to clinic today for the following health issues:    10/30    MS - stable - follows with Neurology    Chronic Pain  lumbar back - chronic right hip fracture - consider [facility] consult    Non-Hodgkin's T-cell Lymphoma: Stable. No issues.     DM    Lab Results   Component Value Date    A1C 6.4 08/03/2019    A1C 6.3 03/17/2019    A1C 6.6 02/09/2019    A1C 6.4 01/30/2019    A1C 6.8 06/20/2018     Lipids    Recent Labs   Lab Test 08/03/19  0930 03/18/19  0625  06/20/18  1529  12/08/14  1756 11/13/12  0957   CHOL 124  --   --  141   < > 172 205*   HDL 36*  --   --  33*   < > 36* 32*   LDL 71  --   --  82   < > 97 140*   TRIG 84 218*   < > 130   < > 197* 167*   CHOLHDLRATIO  --   --   --   --   --  4.8 6.5*    < > = values in this interval not displayed.     CKD/Htn    BP Readings from Last 3 Encounters:   10/30/19 126/74   09/11/19 126/75   08/21/19 (!) 144/81     Creatinine   Date Value Ref Range Status   08/03/2019 0.47 (L) 0.52 - 1.04 mg/dL Final     [facility] clinic follow up next week - has home care    7/29    Multiple Sclerosis: Stable. Patient consults with neurology.      Diabetes: Patient reports blood glucose range of  mg/dL with a fasting morning average of 95 mg/dL. Patient's Diabetes is well controlled. Patient is currently prescribed 500 mg Metformin daily for DM management.           Glucose   Date Value Ref Range Status   06/21/2019 94 70 - 99 mg/dL Final   04/16/2019 115 (H) 70 - 99 mg/dL Final   04/15/2019 115 (H) 70 - 99 mg/dL Final   04/14/2019 109 (H) 70 - 99 mg/dL Final   04/13/2019 106 (H) 70 - 99 mg/dL Final            Lab Results   Component Value Date     A1C 6.3 03/17/2019     A1C 6.6 02/09/2019     A1C 6.4 01/30/2019     A1C 6.8 06/20/2018     A1C 6.5 10/02/2017      Hyperlipidemia: Patient's hyperlipidemia is uncontrolled. Patient is currently prescribed 10 mg  Atorvastatin daily for hyperlipidemia management.              Recent Labs   Lab Test 03/18/19  0625 02/02/19  0455 06/20/18  1529 05/02/16  1613 12/08/14  1756 11/13/12  0957   CHOL  --   --  141 169 172 205*   HDL  --   --  33* 35* 36* 32*   LDL  --   --  82 107* 97 140*   TRIG 218* 292* 130 135 197* 167*   CHOLHDLRATIO  --   --   --   --  4.8 6.5*      Hypertension: Patient presents today as normotensive. Patient is currently prescribed 25 mg hydrochlorothiazide daily, 100 mg Losartan daily, and 50 mg Metoprolol Succinate daily for hypertension management.           Creatinine   Date Value Ref Range Status   06/21/2019 0.50 (L) 0.52 - 1.04 mg/dL Final      Non-Hodgkin's T-cell Lymphoma: Stable. No issues.      Depression/Anxiety: Patient's depression/anxiety is well controlled. Patient is not currently prescribed any medication for depression/anxiety management.     Urosepsis: Stable. No current issues. Patient had scanned performed that showed that patient was not retaining urine.     Low back pain: Stable.      Wounds: Patient reports her pressure wound on her buttocks is healing well and so are her legs. Patient reports she has a follow up with wound care scheduled for 7/30/2019.           Reviewed and updated as needed this visit by Provider         BP Readings from Last 3 Encounters:   10/30/19 126/74   09/11/19 126/75   08/21/19 (!) 144/81       body mass index is 39.47 kg/m .    Wt Readings from Last 4 Encounters:   07/29/19 114.3 kg (252 lb)   07/17/19 114.3 kg (252 lb)   06/21/19 114.3 kg (252 lb)   04/14/19 114.4 kg (252 lb 3.2 oz)       Health Maintenance    Health Maintenance Due   Topic Date Due     DIABETIC FOOT EXAM  1965     EYE EXAM  1965     FIT  05/08/1973     PNEUMOCOCCAL IMMUNIZATION 19-64 HIGHEST RISK (2 of 3 - PCV13) 01/05/2012     MEDICARE ANNUAL WELLNESS VISIT  05/02/2017     DEXA  06/26/2017     MICROALBUMIN  06/20/2019     URINE DRUG SCREEN  06/20/2019     MAMMO SCREENING   07/09/2019     INFLUENZA VACCINE (1) 09/01/2019     ZOSTER IMMUNIZATION (2 of 2) 09/23/2019       Current Problem List    Patient Active Problem List   Diagnosis     MS (multiple sclerosis) (H)     Non Hodgkin's lymphoma (H)     Leukocytosis     Gallstone     Hypertension goal BP (blood pressure) < 140/90     Spinal stenosis, lumbar     Abnormality of gait     Pain medication agreement- signed Nov 20,2012     Lumbar radiculopathy     Colon polyps     Neuralgia, neuritis, and radiculitis, unspecified     Encounter for long-term current use of medication     Health Care Home     Moderate depressive episode (H)     Leg muscle spasm     Chronic pain syndrome     Controlled substance agreement signed     T-cell lymphoma (H)     Type 2 diabetes, HbA1c goal < 7% (H)     Hyperlipidemia LDL goal <70     CKD (chronic kidney disease) stage 1, GFR 90 ml/min or greater     Type 2 diabetes mellitus with stage 1 chronic kidney disease, without long-term current use of insulin (H)     Morbid obesity (H)     Acute hypercapnic respiratory failure (H)     Sepsis (H)     S/P right hip fracture     Lymphedema       Past Medical History    Past Medical History:   Diagnosis Date     Abnormality of gait 7/27/2012     Arrhythmia     with sepsis     Chronic pain     [facility] - yearly, next in summer 2018     CKD (chronic kidney disease) stage 1, GFR 90 ml/min or greater     kidney stones     Colon polyps 1/15    tubular adenomas x 2     Depressive disorder      Gallstone 6/11/2012     Hyperlipidemia LDL goal <70      Hypertension goal BP (blood pressure) < 140/90      Leukocytosis 6/11/2012     Moderate depressive episode (H)      MS (multiple sclerosis) (H) 2003    Dr Vigil/Gaby - [facility]     Multiple sclerosis (H)      Non Hodgkin's lymphoma (H) 06/11/2012    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 Cystoscopy 2. LEFT retrograde pyelogram 3. LEFT JJ stent placement 4. <1hr physician fluoroscopy time;  Surgeon: Epifanio Sapp MD;  Location: [facility] OR     COMBINED CYSTOSCOPY, RETROGRADES, URETEROSCOPY, LASER HOLMIUM LITHOTRIPSY URETER(S), INSERT STENT Left 3/15/2019    Procedure: Cystoscopy, left ureteral stent exchange, left retrograde pyelogram, interpretation of fluoroscopic images, left ureteroscopy with holmium lithotripsy and stone basketing, 22 modifier for difficult lengthy case.;  Surgeon: Mayito Chauhan MD;  Location: [facility] OR     CYSTOSCOPY       CYSTOSCOPY, REMOVE STENT(S), COMBINED Left 3/27/2019    Procedure: Flexible cystoscopy with left ureteral stent removal;  Surgeon: Mayito Chauhan MD;  Location: [facility] OR     FUSION LUMBAR ANTERIOR, FUSION LUMBAR POSTERIOR TWO LEVELS, COMBINED  10/17/2013    lumbar fusion - Dr Floyd     INSERT PUMP BACLOFEN  04/2017    intrathecal baclofen pump implantation     IRRIGATION AND DEBRIDEMENT LOWER EXTREMITY, COMBINED Right 2015    Right ankle I&D d/t infection     OPEN REDUCTION INTERNAL FIXATION ANKLE   5/15    Right Bimalleolar ankle fx ORIF     OPEN REDUCTION INTERNAL FIXATION ANKLE Right 11/2015    Revision due to spasms pulling screws out of ankle     SINUS SURGERY  2011    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     XR LUMBAR EPIDURAL INJECTION INCL IMAGING  3/14    Left L4-5 Epidural Dr Winter       Current Medications    Current Outpatient Medications   Medication Sig Dispense Refill     acetaminophen (TYLENOL) 325 MG tablet Take 2 tablets (650 mg) by mouth every 4 hours as needed for mild pain       acetic acid 0.25 % irrigation Apply topically daily 250 mL 0     albuterol (PROVENTIL) (2.5 MG/3ML) 0.083% neb solution Take 1 vial (2.5 mg) by nebulization once for 1 dose 25 vial 1     amitriptyline (ELAVIL) 100 MG tablet Take 1 tablet (100 mg) by mouth At Bedtime 90 tablet 3     aspirin (ASA) 81 MG chewable tablet Take 1 tablet (81 mg) by mouth daily       atorvastatin (LIPITOR) 10 MG tablet Take 1 tablet (10 mg) by mouth daily 90 tablet 0     baclofen (LIORESAL) 10 MG tablet Take 10 mg by mouth 3 times daily       blood glucose (ACCU-CHEK KEL) test strip Use to test blood sugar 1 times daily or as directed. 100 strip 5     blood glucose (NO BRAND SPECIFIED) lancets standard Use to test blood sugar 1 times daily or as directed. 100 each 5     blood glucose calibration (NO BRAND SPECIFIED) solution Use to calibrate blood glucose monitor as needed as directed. 1 each 0     blood glucose monitoring (ACCU-CHEK KEL PLUS) meter device kit Use to test blood sugar 1 times daily or as directed. 1 kit 0     blood glucose monitoring (ACCU-CHEK MULTICLIX) lancets Use to test blood sugar 1 times daily or as directed. 100 each 5     DULoxetine (CYMBALTA) 60 MG capsule Take 1 capsule (60 mg) by mouth 2 times daily 180 capsule 3     gabapentin (NEURONTIN) 300 MG capsule Take 600 mg by mouth 3 times daily       losartan-hydrochlorothiazide (HYZAAR) 50-12.5 MG tablet Take 1 tablet by mouth daily 90 tablet 3      metoprolol succinate ER (TOPROL-XL) 50 MG 24 hr tablet Take 1 tablet (50 mg) by mouth daily 90 tablet 1     Multiple Vitamin (MULTI-VITAMIN PO) Take 1 tablet by mouth daily        naloxone (NARCAN) 4 MG/0.1ML nasal spray Spray 1 spray (4 mg) into one nostril alternating nostrils once as needed for opioid reversal Every 2-3 minutes until patient responsive or EMS arrives 0.2 mL 0     omega-3 fatty acids (FISH OIL) 1200 MG capsule Take 1 capsule by mouth daily.       order for DME Equipment being ordered: Flexi Plus Bilateral 1 Device 0     order for DME Compression Velcro wraps  Compriflex light compression   Length of Need: Life Time  # of Pairs 2  Open Wound of bilateral lower legs  Swelling of both lower legs  Lymphedema of both lower legs  Chronic Ulcer of skin bilaterally  Varicose Vein of Lower Ext bilaterally  Venous Ulcers of bilateral lower legs 2 Device 0     order for DME Princess Cheko to evaluate wheelchair and seating situations at home to optimize off-loading. 1 Units 0     oxyCODONE IR (ROXICODONE) 15 MG tablet Take 1 tablet (15 mg) by mouth every 6 hours as needed for moderate to severe pain Limit 4 tablet(s) per day. 120 tablet 0     senna-docusate (SENOKOT-S/PERICOLACE) 8.6-50 MG tablet Take 2 tablets by mouth daily as needed for constipation 180 tablet 0     vitamin D3 (VITAMIN D3) 1000 units (25 mcg) tablet Take 1 tablet (1,000 Units) by mouth daily       metFORMIN (GLUCOPHAGE) 500 MG tablet TAKE 1 TABLET(500 MG) BY MOUTH DAILY WITH DINNER 90 tablet 3       Allergies    Allergies   Allergen Reactions     Lisinopril      Lip swelling     Cyclobenzaprine Other (See Comments)     Per 4-10-17 H&P by Yanna Dugan PA-C.     Flexeril [Cyclobenzaprine Hcl]      Got confused        Immunizations    Immunization History   Administered Date(s) Administered     Influenza (H1N1) 12/10/2009     Influenza (IIV3) PF 12/10/2009, 11/06/2012, 12/08/2014     Influenza Vaccine IM > 6 months Valent IIV4 10/21/2013,  2018     Influenza Vaccine, 3 YRS +, IM (QUADRIVALENT W/PRESERVATIVES) 2017     Pneumococcal 23 valent 2011     TDAP Vaccine (Adacel) 2018     Tdap (Adacel,Boostrix) 2008     Zoster vaccine recombinant adjuvanted (SHINGRIX) 2019       Family History    Family History   Problem Relation Age of Onset     C.A.D. Father         with CHF      Cancer Father         ? unsure type - abdominal      Hypertension Mother      Thyroid Disease Mother         goiter      Breast Cancer Sister 58     Thyroid Disease Son      Cancer - colorectal No family hx of        Social History    Social History     Socioeconomic History     Marital status:      Spouse name: Fernando     Number of children: 3     Years of education: 14     Highest education level: Not on file   Occupational History     Employer: UNEMPLOYED   Social Needs     Financial resource strain: Not on file     Food insecurity:     Worry: Not on file     Inability: Not on file     Transportation needs:     Medical: Not on file     Non-medical: Not on file   Tobacco Use     Smoking status: Former Smoker     Packs/day: 0.50     Years: 0.00     Pack years: 0.00     Types: Cigarettes     Last attempt to quit: 2013     Years since quittin.2     Smokeless tobacco: Never Used     Tobacco comment: since age 17   Substance and Sexual Activity     Alcohol use: No     Drug use: No     Sexual activity: Yes     Partners: Male   Lifestyle     Physical activity:     Days per week: Not on file     Minutes per session: Not on file     Stress: Not on file   Relationships     Social connections:     Talks on phone: Not on file     Gets together: Not on file     Attends Holiness service: Not on file     Active member of club or organization: Not on file     Attends meetings of clubs or organizations: Not on file     Relationship status: Not on file     Intimate partner violence:     Fear of current or ex partner: Not on file     Emotionally  "abused: Not on file     Physically abused: Not on file     Forced sexual activity: Not on file   Other Topics Concern     Parent/sibling w/ CABG, MI or angioplasty before 65F 55M? No      Service Not Asked     Blood Transfusions Not Asked     Caffeine Concern Yes     Comment: occas     Occupational Exposure Not Asked     Hobby Hazards Not Asked     Sleep Concern Not Asked     Stress Concern Not Asked     Weight Concern Not Asked     Special Diet Not Asked     Back Care Not Asked     Exercise Yes     Comment: as able     Bike Helmet Not Asked     Seat Belt Yes     Self-Exams Not Asked   Social History Narrative     Not on file       All above reviewed and updated, all stable unless otherwise noted    Recent labs reviewed    ROS:  CONSTITUTIONAL: NEGATIVE for fever, chills, change in weight  INTEGUMENTARY/SKIN: NEGATIVE for worrisome rashes, moles or lesions  EYES: NEGATIVE for vision changes or irritation  ENT/MOUTH: NEGATIVE for ear, mouth and throat problems  RESP: NEGATIVE for significant cough or SOB  BREAST: NEGATIVE for masses, tenderness or discharge  CV: NEGATIVE for chest pain, palpitations or peripheral edema  GI: NEGATIVE for nausea, abdominal pain, heartburn, or change in bowel habits  : NEGATIVE for frequency, dysuria, or hematuria  NEURO: NEGATIVE for weakness, dizziness or paresthesias  ENDOCRINE: NEGATIVE for temperature intolerance, skin/hair changes  HEME: NEGATIVE for bleeding problems  PSYCHIATRIC: NEGATIVE for changes in mood or affect    OBJECTIVE:                                                    /74 (BP Location: Left arm, Patient Position: Chair, Cuff Size: Adult Large)   Pulse 81   Temp 98.1  F (36.7  C) (Oral)   Ht 1.702 m (5' 7\")   LMP 12/11/2011   SpO2 92%   Breastfeeding? No   BMI 39.47 kg/m    Body mass index is 39.47 kg/m .  GENERAL: healthy, alert and no distress  EYES: Eyes grossly normal to inspection, extraocular movements - intact, and PERRL  HENT: ear " canals- normal; TMs- normal; Nose- normal; Mouth- no ulcers, no lesions  NECK: no tenderness, no adenopathy, no asymmetry, no masses, no stiffness; thyroid- normal to palpation  RESP: lungs clear to auscultation - no rales, no rhonchi, no wheezes  CV: regular rates and rhythm, normal S1 S2, no S3 or S4 and no murmur, no click or rub -  ABDOMEN: soft, no tenderness, no  hepatosplenomegaly, no masses, normal bowel sounds  MS: extremities- no gross deformities noted, no edema  SKIN: no suspicious lesions, no rashes  NEURO: at baseline, limited mobility, essentially wheelchair bound  BACK: no CVA tenderness, no paralumbar tenderness  PSYCH: Alert and oriented times 3; speech- coherent , normal rate and volume; able to articulate logical thoughts, able to abstract reason, no tangential thoughts, no hallucinations or delusions, affect- normal  LYMPHATICS: no cervical adenopathy    DIAGNOSTICS/PROCEDURES:                                                      Reviewed recent labs     ASSESSMENT:                                                        ICD-10-CM    1. MS (multiple sclerosis) (H) G35    2. 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 Spinal stenosis of lumbar region, unspecified whether neurogenic claudication present M48.061    4. Lumbar radiculopathy M54.16    5. Chronic pain syndrome G89.4 DULoxetine (CYMBALTA) 60 MG capsule     PAIN MANAGEMENT REFERRAL   6. Type 2 diabetes, HbA1c goal < 7% (H) E11.9    7. Type 2 diabetes mellitus with stage 1 chronic kidney disease, without long-term current use of insulin (H) E11.22     N18.1    8. CKD (chronic kidney disease) stage 1, GFR 90 ml/min or greater N18.1    9. Hypertension goal BP (blood pressure) < 140/90 I10 losartan-hydrochlorothiazide (HYZAAR) 50-12.5 MG tablet   10. Hyperlipidemia LDL goal <70 E78.5    11. Morbid obesity (H) E66.01    12. Medication monitoring encounter Z51.81          PLAN:                                                       Discussed treatment/modality options, including risk and benefits she desires:    Med refills.  [facility] consult.  Neurology consult.  Close follow up.     All diagnosis above reviewed and noted above, otherwise stable.  See [employer] orders for further details.     Return in about 3 months (around 1/30/2020), or if symptoms worsen or fail to improve, for Medication Recheck Visit, Follow Up Chronic.    Health Maintenance Due   Topic Date Due     DIABETIC FOOT EXAM  1965     EYE EXAM  1965     FIT  05/08/1973     PNEUMOCOCCAL IMMUNIZATION 19-64 HIGHEST RISK (2 of 3 - PCV13) 01/05/2012     MEDICARE ANNUAL WELLNESS VISIT  05/02/2017     DEXA  06/26/2017     MICROALBUMIN  06/20/2019     URINE DRUG SCREEN  06/20/2019     MAMMO SCREENING  07/09/2019     INFLUENZA VACCINE (1) 09/01/2019     ZOSTER IMMUNIZATION (2 of 2) 09/23/2019       See Patient Instructions           Julius Hassan MD [address]  [address] [phone] Fax

## 2019-10-31 DIAGNOSIS — N18.1 TYPE 2 DIABETES MELLITUS WITH STAGE 1 CHRONIC KIDNEY DISEASE, WITHOUT LONG-TERM CURRENT USE OF INSULIN (H): ICD-10-CM

## 2019-10-31 DIAGNOSIS — E11.22 TYPE 2 DIABETES MELLITUS WITH STAGE 1 CHRONIC KIDNEY DISEASE, WITHOUT LONG-TERM CURRENT USE OF INSULIN (H): ICD-10-CM

## 2019-10-31 DIAGNOSIS — G89.4 CHRONIC PAIN SYNDROME: ICD-10-CM

## 2019-10-31 DIAGNOSIS — M54.16 LUMBAR RADICULOPATHY: ICD-10-CM

## 2019-10-31 DIAGNOSIS — F32.A MODERATE DEPRESSIVE EPISODE: ICD-10-CM

## 2019-10-31 DIAGNOSIS — G35 MS (MULTIPLE SCLEROSIS) (H): ICD-10-CM

## 2019-10-31 RX ORDER — AMITRIPTYLINE HYDROCHLORIDE 100 MG/1
TABLET ORAL
Qty: 90 TABLET | Refills: 0 | OUTPATIENT
Start: 2019-10-31

## 2019-10-31 NOTE — TELEPHONE ENCOUNTER
Routing refill request to provider for review/approval because:  Labs not current:  microalbumin    Patient has refills remaining at pharmacy-amitriptylline  LILLIANA ShirleyN, RN  [employer] Triage

## 2019-10-31 NOTE — TELEPHONE ENCOUNTER
"Requested Prescriptions   Pending Prescriptions Disp Refills     metFORMIN (GLUCOPHAGE) 500 MG tablet [Pharmacy Med Name: METFORMIN 500MG TABLETS] 90 tablet 0     Sig: TAKE 1 TABLET(500 MG) BY MOUTH DAILY WITH DINNER       Biguanide Agents Failed - 10/31/2019  3:39 AM        Failed - Patient has had a Microalbumin in the past 15 mos.     Recent Labs   Lab Test 06/20/18  1544   MICROL 457   UMALCR 761.67*             Passed - Blood pressure less than 140/90 in past 6 months     BP Readings from Last 3 Encounters:   10/30/19 126/74   09/11/19 126/75   08/21/19 (!) 144/81                 Passed - Patient has documented LDL within the past 12 mos.     Recent Labs   Lab Test 08/03/19  0930   LDL 71             Passed - Patient is age 10 or older        Passed - Patient has documented A1c within the specified period of time.     If HgbA1C is 8 or greater, it needs to be on file within the past 3 months.  If less than 8, must be on file within the past 6 months.     Recent Labs   Lab Test 08/03/19  0930   A1C 6.4*             Passed - Patient's CR is NOT>1.4 OR Patient's EGFR is NOT<45 within past 12 mos.     Recent Labs   Lab Test 08/03/19  0930   GFRESTIMATED >90   GFRESTBLACK >90       Recent Labs   Lab Test 08/03/19  0930   CR 0.47*             Passed - Patient does NOT have a diagnosis of CHF.        Passed - Medication is active on med list        Passed - Patient is not pregnant        Passed - Patient has not had a positive pregnancy test within the past 12 mos.         Passed - Recent (6 mo) or future (30 days) visit within the authorizing provider's specialty     Patient had office visit in the last 6 months or has a visit in the next 30 days with authorizing provider or within the authorizing provider's specialty.  See \"Patient Info\" tab in inbasket, or \"Choose Columns\" in Meds & Orders section of the refill encounter.            amitriptyline (ELAVIL) 100 MG tablet [Pharmacy Med Name: AMITRIPTYLINE 100MG " "(HUNDRED MG)TAB] 90 tablet 0     Sig: TAKE 1 TABLET(100 MG) BY MOUTH AT BEDTIME       Tricyclic Agents ( Annual appt and no PHQ9) Passed - 10/31/2019  3:39 AM        Passed - Blood Pressure under 140/90 in past 12 mos     BP Readings from Last 3 Encounters:   10/30/19 126/74   09/11/19 126/75   08/21/19 (!) 144/81                 Passed - Recent (12 mo) or future (30 days) visit within authorizing provider's specialty     Patient has had an office visit with the authorizing provider or a provider within the authorizing providers department within the previous 12 mos or has a future within next 30 days. See \"Patient Info\" tab in inbasket, or \"Choose Columns\" in Meds & Orders section of the refill encounter.              Passed - Medication is active on med list        Passed - Patient is age 18 or older        Passed - Patient is not pregnant        Passed - No positive pregnancy test on record in past 12 mos        metFORMIN (GLUCOPHAGE) 500 MG tablet  Last Written Prescription Date:  7-16-18  Last Fill Quantity: 90,  # refills: 3   Last office visit: 10-31-19 with   Dr. Mo Welsh Office Visit:   Next 5 appointments (look out 90 days)    Nov 06, 2019  3:15 PM CST  Return Visit with Robert Eugene MD  [facility] ([facility]) [address]  [address] [address]  [phone]   Jan 29, 2020  3:50 PM CST  SHORT with Julius Hassan MD  [facility] ([address] [address] [phone]         amitriptyline (ELAVIL) 100 MG tablet  Last Written Prescription Date:  10-29-19  Last Fill Quantity: 90,  # refills: 3   Last office visit: 10-31-19 with Dr. Mo Welsh Office Visit:   Next 5 appointments (look out 90 days)    Nov 06, 2019  3:15 PM CST  Return Visit with Robert Eugene MD  [facility] ([facility]) [address]  [address]  [facility] [address]  [phone]   Jan 29, 2020  3:50 PM CST  [facility]  [facility] [address] [place] [address]  [phone]

## 2019-11-01 NOTE — TELEPHONE ENCOUNTER
rx done, advise due for urine (CDAU, UA/UC/Microalbumin)    Lab Results   Component Value Date    A1C 6.4 08/03/2019    A1C 6.3 03/17/2019    A1C 6.6 02/09/2019    A1C 6.4 01/30/2019    A1C 6.8 06/20/2018     Recent Labs   Lab Test 08/03/19  0930 03/18/19  0625  06/20/18  1529  12/08/14  1756 11/13/12  0957   CHOL 124  --   --  141   < > 172 205*   HDL 36*  --   --  33*   < > 36* 32*   LDL 71  --   --  82   < > 97 140*   TRIG 84 218*   < > 130   < > 197* 167*   CHOLHDLRATIO  --   --   --   --   --  4.8 6.5*    < > = values in this interval not displayed.     Creatinine   Date Value Ref Range Status   08/03/2019 0.47 (L) 0.52 - 1.04 mg/dL Final     GFR Estimate   Date Value Ref Range Status   08/03/2019 >90 >60 mL/min/[1.73_m2] Final     Comment:     Non  GFR Calc  Starting 12/18/2018, serum creatinine based estimated GFR (eGFR) will be   calculated using the Chronic Kidney Disease Epidemiology Collaboration   (CKD-EPI) equation.

## 2019-11-01 NOTE — TELEPHONE ENCOUNTER
She called back and we scheduled a lab only    I entered orders as written below  Princess Huff RN- Triage [employer]

## 2019-11-05 DIAGNOSIS — R82.90 NONSPECIFIC FINDING ON EXAMINATION OF URINE: Primary | ICD-10-CM

## 2019-11-05 DIAGNOSIS — G89.4 CHRONIC PAIN SYNDROME: ICD-10-CM

## 2019-11-05 DIAGNOSIS — N18.1 TYPE 2 DIABETES MELLITUS WITH STAGE 1 CHRONIC KIDNEY DISEASE, WITHOUT LONG-TERM CURRENT USE OF INSULIN (H): ICD-10-CM

## 2019-11-05 DIAGNOSIS — E11.22 TYPE 2 DIABETES MELLITUS WITH STAGE 1 CHRONIC KIDNEY DISEASE, WITHOUT LONG-TERM CURRENT USE OF INSULIN (H): ICD-10-CM

## 2019-11-05 LAB
ALBUMIN UR-MCNC: >=300 MG/DL
APPEARANCE UR: CLEAR
BACTERIA #/AREA URNS HPF: ABNORMAL /HPF
BILIRUB UR QL STRIP: NEGATIVE
COLOR UR AUTO: YELLOW
GLUCOSE UR STRIP-MCNC: NEGATIVE MG/DL
HGB UR QL STRIP: ABNORMAL
KETONES UR STRIP-MCNC: NEGATIVE MG/DL
LEUKOCYTE ESTERASE UR QL STRIP: ABNORMAL
NITRATE UR QL: POSITIVE
NON-SQ EPI CELLS #/AREA URNS LPF: ABNORMAL /LPF
PH UR STRIP: 6 PH (ref 5–7)
RBC #/AREA URNS AUTO: ABNORMAL /HPF
SOURCE: ABNORMAL
SP GR UR STRIP: >1.03 (ref 1–1.03)
UROBILINOGEN UR STRIP-ACNC: 0.2 EU/DL (ref 0.2–1)
WBC #/AREA URNS AUTO: ABNORMAL /HPF

## 2019-11-05 PROCEDURE — 82043 UR ALBUMIN QUANTITATIVE: CPT | Performed by: FAMILY MEDICINE

## 2019-11-05 PROCEDURE — 81001 URINALYSIS AUTO W/SCOPE: CPT | Performed by: FAMILY MEDICINE

## 2019-11-05 PROCEDURE — 99000 SPECIMEN HANDLING OFFICE-LAB: CPT | Performed by: FAMILY MEDICINE

## 2019-11-05 PROCEDURE — 87186 SC STD MICRODIL/AGAR DIL: CPT | Performed by: FAMILY MEDICINE

## 2019-11-05 PROCEDURE — 87086 URINE CULTURE/COLONY COUNT: CPT | Performed by: FAMILY MEDICINE

## 2019-11-05 PROCEDURE — 80307 DRUG TEST PRSMV CHEM ANLYZR: CPT | Mod: 90 | Performed by: FAMILY MEDICINE

## 2019-11-05 PROCEDURE — 87088 URINE BACTERIA CULTURE: CPT | Performed by: FAMILY MEDICINE

## 2019-11-06 ENCOUNTER — HOSPITAL ENCOUNTER (OUTPATIENT)
Dept: WOUND CARE | Facility: CLINIC | Age: 54
Discharge: HOME | End: 2019-11-06
Attending: SURGERY | Admitting: SURGERY
Payer: COMMERCIAL

## 2019-11-06 DIAGNOSIS — L89.324 PRESSURE INJURY OF LEFT BUTTOCK, STAGE 4 (H): ICD-10-CM

## 2019-11-06 LAB
CREAT UR-MCNC: 89 MG/DL
MICROALBUMIN UR-MCNC: 1800 MG/L
MICROALBUMIN/CREAT UR: 2020.2 MG/G CR (ref 0–25)

## 2019-11-06 PROCEDURE — 97602 WOUND(S) CARE NON-SELECTIVE: CPT

## 2019-11-06 PROCEDURE — 99213 OFFICE O/P EST LOW 20 MIN: CPT | Performed by: SURGERY

## 2019-11-06 NOTE — PROGRESS NOTES
[facility] [place] Progress Note    Subject: Amanda Richardson multiple sclerosis, wheelchair-bound, her  performs her cares, he does an  excellent job.  Medical records reviewed.  Left ischial tuberosity ulcer has been treated with endoform.  She is tried  simple lymphedema pump in the past which had failed to control her bilateral lower extremity lymphedema tarda, had recommended an  advanced Flexitouch lymphedema pump for management.  Also recommended EdemaWear which she has been wearing during the nighttime, I recommend utilization during the day and night in addition to her Velcro wraps.  Overall skin of the lower cavities has improved,  is applying Sween cream on a daily basis.  She is also developed a new rash in the left abdominal fold.    Patient Active Problem List   Diagnosis     MS (multiple sclerosis) (H)     Non Hodgkin's lymphoma (H)     Leukocytosis     Gallstone     Hypertension goal BP (blood pressure) < 140/90     Spinal stenosis, lumbar     Abnormality of gait     Pain medication agreement- signed Nov 20,2012     Lumbar radiculopathy     Colon polyps     Neuralgia, neuritis, and radiculitis, unspecified     Encounter for long-term current use of medication     Health Care Home     Moderate depressive episode (H)     Leg muscle spasm     Chronic pain syndrome     Controlled substance agreement signed     T-cell lymphoma (H)     Type 2 diabetes, HbA1c goal < 7% (H)     Hyperlipidemia LDL goal <70     CKD (chronic kidney disease) stage 1, GFR 90 ml/min or greater     Type 2 diabetes mellitus with stage 1 chronic kidney disease, without long-term current use of insulin (H)     Morbid obesity (H)     Acute hypercapnic respiratory failure (H)     Sepsis (H)     S/P right hip fracture     Lymphedema     Past Medical History:   Diagnosis Date     Abnormality of gait 7/27/2012     Arrhythmia     with sepsis     Chronic pain     [facility] - yearly, next in  summer 2018     CKD (chronic kidney disease) stage 1, GFR 90 ml/min or greater     kidney stones     Colon polyps 1/15    tubular adenomas x 2     Depressive disorder      Gallstone 6/11/2012     Hyperlipidemia LDL goal <70      Hypertension goal BP (blood pressure) < 140/90      Leukocytosis 6/11/2012     Moderate depressive episode (H)      MS (multiple sclerosis) (H) 2003    Dr Vigil/G[facility]     Multiple sclerosis (H)      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     Tobacco abuse 06/11/2012    former     Type 2 diabetes, HbA1c goal < 7% (H)      Exam:  LMP 12/11/2011   Wound (used by OP [facility] only) 07/09/19 1527 Left coccyx pressure injury (Active)   Length (cm) 0.5 11/6/2019  3:00 PM   Width (cm) 0.3 11/6/2019  3:00 PM   Depth (cm) 0.9 11/6/2019  3:00 PM   Wound (cm^2) 0.15 cm^2 11/6/2019  3:00 PM   Wound Volume (cm^3) 0.14 cm^3 11/6/2019  3:00 PM   Wound healing % 95.45 11/6/2019  3:00 PM   Dressing Appearance moist drainage 11/6/2019  3:00 PM   Drainage Characteristics/Odor serosanguineous 11/6/2019  3:00 PM   Drainage Amount moderate 11/6/2019  3:00 PM   Thickness/Stage Stage 3 11/6/2019  3:00 PM   Base red/granulating 11/6/2019  3:00 PM   Periwound intact 11/6/2019  3:00 PM   Periwound Temperature  warm 11/6/2019  3:00 PM   Periwound Skin Turgor soft 11/6/2019  3:00 PM   Edges open 11/6/2019  3:00 PM   Care, Wound non-select wound debridement performed 11/6/2019  3:00 PM     Multiple sclerosis, 54-year-old female, conversant.  Evaluation of the left groin reveals several areas of irritation consistent with a fungal colonization, these areas will be treated with a fungal cream and dry gauze or paper towel to line the left abdominal crease and change twice a day and wash thoroughly with a wound cleanser.  The left issue tuberosity ulceration is approximately 0.1 x 0.1 mm, otherwise fully epithelialized and will begin to treat with a collagen hydrogel.        Impression: Multiple sclerosis, left issue tuberosity ulceration ulceration, near completely resolved, chronic lymphedema tarda right and left lower extremities, fungal colonization left groin fold.    Plan: We will dress the wounds with fungal cream to left groin fold, collagen hydrogel to left remnant issue tuberosity de-epithelialized area, Flexitouch plus  pump utilization right and left legs for 1 hour/day.  Where EdemaWear 24 hours a day, 7 days a week, use a Crow wraps on calves during day..  Patient will return to the clinic in 8 weeks time    Robert Eugene MD on 11/6/2019 at 5:24 PM

## 2019-11-06 NOTE — DISCHARGE INSTRUCTIONS
[facility]  [address] [facility] [address], [facility] [address]  Appointment Phone [phone] Nurse Advisors [phone]    Amanda Richardson      1965    Please add in 1 packet of Chris Supplement TWICE a day until your next  appointment  Stephanie Anderson Cardiovascular Support Blood Health Vascular 1 tablet twice daily  [facility] Phone:[phone] Fax:[phone]    Wash legs with soap and water daily, apply Curel lotion right after bathing.  Buttocks cleanse with soap and water apply A&D ointment or dimethicone cream  Wound Dressing Change:Coccyx  Cleanse wound with vashe  Cover wound with woundres gel and mepilex border  Change dressing Monday Wed Friday    Compression:Ok to wear edemawear during the day under velcro wraps  You have a compression lymphedema velcro wraps is supposed to be on during the day and wear EDEMAWEAR at night  Please remove compression dressing if toes turn blue and/or tingle and can not be  relieved by raising the leg for one hour.       ARTHUR Eugene M.D.. November 6, 2019    Call us at [phone] if you have any questions about your wounds, have redness or swelling around your wound, have a fever of 101 or greater or if you have any other problems or concerns. We answer the phone Monday through Friday 8 am to 4 pm, please leave a message as we check the voicemail frequently throughout the day.     Follow up with Provider - 4 weeks

## 2019-11-07 LAB
BACTERIA SPEC CULT: ABNORMAL
Lab: ABNORMAL
SPECIMEN SOURCE: ABNORMAL

## 2019-11-08 DIAGNOSIS — N39.0 URINARY TRACT INFECTION: Primary | ICD-10-CM

## 2019-11-08 RX ORDER — NITROFURANTOIN 25; 75 MG/1; MG/1
100 CAPSULE ORAL 2 TIMES DAILY
Qty: 14 CAPSULE | Refills: 0 | Status: SHIPPED | OUTPATIENT
Start: 2019-11-08 | End: 2020-01-29

## 2019-11-08 NOTE — RESULT ENCOUNTER NOTE
Please call pt with results below:     -Urine culture is abnormal and  Needs to be treated for infection. Recommend treatment with Macrobid 100mg po bid x 7 days.  Triage, please place order to pharmacy of pt's choice.  Your urine should be retested after treatment with a lab appointment about 2 weeks after finishing the antibiotic.    For additional lab test information, [email or web address] is an excellent reference.

## 2019-11-10 LAB — PAIN DRUG SCR UR W RPTD MEDS: NORMAL

## 2019-11-11 ENCOUNTER — TELEPHONE (OUTPATIENT)
Dept: FAMILY MEDICINE | Facility: CLINIC | Age: 54
End: 2019-11-11

## 2019-11-11 ENCOUNTER — MEDICAL CORRESPONDENCE (OUTPATIENT)
Dept: HEALTH INFORMATION MANAGEMENT | Facility: CLINIC | Age: 54
End: 2019-11-11

## 2019-11-11 NOTE — TELEPHONE ENCOUNTER
Date Forms was received: November 11, 2019    Forms received by: Fax    Purpose of Form:  [employer] - wound supplies    How the form needs to be returned for patient:  Fax    Form currently placed  [employer]

## 2019-11-12 ENCOUNTER — TELEPHONE (OUTPATIENT)
Dept: FAMILY MEDICINE | Facility: CLINIC | Age: 54
End: 2019-11-12

## 2019-11-12 ENCOUNTER — MEDICAL CORRESPONDENCE (OUTPATIENT)
Dept: HEALTH INFORMATION MANAGEMENT | Facility: CLINIC | Age: 54
End: 2019-11-12

## 2019-11-12 NOTE — TELEPHONE ENCOUNTER
Completed forms faxed back to [facility] at [phone].   Originals sent to be scanned.     Katharina Goodwin

## 2019-11-12 NOTE — TELEPHONE ENCOUNTER
Date Forms was received: November 12, 2019    Forms received by: Fax    Purpose of Form:  [facility] plan of care    How the form needs to be returned for patient:  Fax    Form currently placed  [employer]

## 2019-11-12 NOTE — TELEPHONE ENCOUNTER
Completed forms faxed back to [facility] at [phone].   Originals sent to be scanned.     Katharina Goodwin

## 2019-11-26 ENCOUNTER — MYC REFILL (OUTPATIENT)
Dept: FAMILY MEDICINE | Facility: CLINIC | Age: 54
End: 2019-11-26

## 2019-11-26 DIAGNOSIS — G35 MS (MULTIPLE SCLEROSIS) (H): ICD-10-CM

## 2019-11-26 DIAGNOSIS — G89.4 CHRONIC PAIN SYNDROME: ICD-10-CM

## 2019-11-26 RX ORDER — OXYCODONE HYDROCHLORIDE 15 MG/1
15 TABLET ORAL EVERY 6 HOURS PRN
Qty: 120 TABLET | Refills: 0 | Status: SHIPPED | OUTPATIENT
Start: 2019-11-27 | End: 2019-12-23

## 2019-11-26 NOTE — TELEPHONE ENCOUNTER
Controlled Substance Refill Request for Oxycodone IR 15 mg tablet  Problem List Complete:    Yes    Last Written Prescription Date:  10/30/2019  Last Fill Quantity: 120,   # refills: 0      Last Office Visit with [facility] primary care provider: 10/30/2019    Future Office visit:   Next 5 appointments (look out 90 days)    Dec 18, 2019  3:15 PM CST  Return Visit with Robert Eugene MD  [facility] [place] ([facility]) [address] [place] [address] [address]  [facility] [address]  [phone]   Jan 29, 2020  3:50 PM CST  SHORT with Julius Hassan MD  [facility] [address] [place] [address]  [phone]          Controlled substance agreement:   Encounter-Level CSA - 02/12/2018:    Controlled Substance Agreement - Scan on 2/28/2018 10:12 AM: CONTROLLED SUBSTANCE AGREEMENT     Encounter-Level CSA - 06/09/2016:    Controlled Substance Agreement - Scan on 6/17/2016 10:49 AM: CONTROLLED SUBSTANCE AGREEMENT     Encounter-Level CSA - 04/03/2015:    Controlled Substance Agreement - Scan on 4/12/2015 10:28 AM: Controlled Medication Agreement 04/03/15     Patient-Level CSA:    There are no patient-level csa.         Last Urine Drug Screen:   Pain Drug SCR UR W RPTD Meds   Date Value Ref Range Status   11/05/2019 FINAL  Final     Comment:     (Note)  ====================================================================  [facility] COMP DRUG ANALYSIS,UR  ====================================================================  Test                             Result       Flag       Units        Drug Present   Oxycodone                      2463                    ng/mg creat   Oxymorphone                    4415                    ng/mg creat   Noroxycodone                   4391                    ng/mg creat   Noroxymorphone                 1432                    ng/mg creat    Sources of oxycodone are scheduled prescription  medications.    Oxymorphone, noroxycodone, and noroxymorphone are expected    metabolites of oxycodone. Oxymorphone is also available as a    scheduled prescription medication.   Gabapentin                     PRESENT                               Duloxetine                     PRESENT                               Salicylate                     PRESENT                               Metoprolol                     PRESENT                              ====================================================================  Test                      Result    Flag   Units      Ref Range        Creatinine              97               mg/dL      >=20            ====================================================================  Declared Medications:  Medication list was not provided.  ====================================================================  For clinical consultation, please call [phone].  ====================================================================  Analysis performed by [employer] [place] [address]     , No results found for: COMDAT, No results found for: THC13, PCP13, COC13, MAMP13, OPI13, AMP13, BZO13, TCA13, MTD13, BAR13, OXY13, PPX13, BUP13     Processing:  Rx to be electronically transmitted to pharmacy by provider     [email or web address]   checked in past 3 months?  Yes 11/26/2019, no concerns noted         Routing refill request to provider for review/approval because:  Drug not on the [facility] refill protocol       Chandrika Nathan BS, RN, PHN  [facility]  Office: [phone]  Fax: [phone]

## 2019-12-13 ENCOUNTER — MEDICAL CORRESPONDENCE (OUTPATIENT)
Dept: HEALTH INFORMATION MANAGEMENT | Facility: CLINIC | Age: 54
End: 2019-12-13

## 2019-12-13 ENCOUNTER — TELEPHONE (OUTPATIENT)
Dept: FAMILY MEDICINE | Facility: CLINIC | Age: 54
End: 2019-12-13

## 2019-12-13 NOTE — TELEPHONE ENCOUNTER
Completed forms faxed back to [facility] at [phone].   Originals sent to be scanned.       Jolene Polo

## 2019-12-18 ENCOUNTER — HOSPITAL ENCOUNTER (OUTPATIENT)
Dept: WOUND CARE | Facility: CLINIC | Age: 54
Discharge: HOME | End: 2019-12-18
Attending: SURGERY | Admitting: SURGERY
Payer: COMMERCIAL

## 2019-12-18 ENCOUNTER — MEDICAL CORRESPONDENCE (OUTPATIENT)
Dept: HEALTH INFORMATION MANAGEMENT | Facility: CLINIC | Age: 54
End: 2019-12-18

## 2019-12-18 VITALS
HEART RATE: 92 BPM | RESPIRATION RATE: 18 BRPM | DIASTOLIC BLOOD PRESSURE: 66 MMHG | TEMPERATURE: 97.6 F | SYSTOLIC BLOOD PRESSURE: 143 MMHG

## 2019-12-18 DIAGNOSIS — G35 MS (MULTIPLE SCLEROSIS) (H): ICD-10-CM

## 2019-12-18 DIAGNOSIS — L89.323 PRESSURE INJURY OF LEFT BUTTOCK, STAGE 3 (H): Primary | ICD-10-CM

## 2019-12-18 PROCEDURE — 99213 OFFICE O/P EST LOW 20 MIN: CPT | Performed by: SURGERY

## 2019-12-18 PROCEDURE — 97602 WOUND(S) CARE NON-SELECTIVE: CPT

## 2019-12-18 RX ORDER — LOSARTAN POTASSIUM 50 MG/1
TABLET ORAL
Refills: 1 | COMMUNITY
Start: 2019-09-19 | End: 2020-01-29

## 2019-12-18 NOTE — PROGRESS NOTES
[facility] [place] Progress Note    Subject: Amanda Richardson returns for evaluation, most recent wound clinic November 6, 2019.  Multiple sclerosis, wheelchair-bound can stand for transfers, her  performs her cares, he does not excellent job of supporting her.  Chronic left issue tuberosity ulceration has been treated with endoform, has made excellent progress.  She does have a  simple lymphedema pump which had failed in the past to control her bilateral extremity lymphedema tarda, we had recommended an  advanced Flexitouch proprietary lymphedema pump for management to allow adequate receptive decompression and to maximize lower extremity lymphedema management.  We also discussed utilization of edema wear 24 hours a day, 7 days a week to maximize lower extremity edema.  They have been using a cream that has been irritating her skin, we will cease this.    Patient Active Problem List   Diagnosis     MS (multiple sclerosis) (H)     Non Hodgkin's lymphoma (H)     Leukocytosis     Gallstone     Hypertension goal BP (blood pressure) < 140/90     Spinal stenosis, lumbar     Abnormality of gait     Pain medication agreement- signed Nov 20,2012     Lumbar radiculopathy     Colon polyps     Neuralgia, neuritis, and radiculitis, unspecified     Encounter for long-term current use of medication     Health Care Home     Moderate depressive episode (H)     Leg muscle spasm     Chronic pain syndrome     Controlled substance agreement signed     T-cell lymphoma (H)     Type 2 diabetes, HbA1c goal < 7% (H)     Hyperlipidemia LDL goal <70     CKD (chronic kidney disease) stage 1, GFR 90 ml/min or greater     Type 2 diabetes mellitus with stage 1 chronic kidney disease, without long-term current use of insulin (H)     Morbid obesity (H)     Acute hypercapnic respiratory failure (H)     Sepsis (H)     S/P right hip fracture     Lymphedema     Past Medical History:   Diagnosis Date     Abnormality of  gait 7/27/2012     Arrhythmia     with sepsis     Chronic pain     [facility] - yearly, next in summer 2018     CKD (chronic kidney disease) stage 1, GFR 90 ml/min or greater     kidney stones     Colon polyps 1/15    tubular adenomas x 2     Depressive disorder      Gallstone 6/11/2012     Hyperlipidemia LDL goal <70      Hypertension goal BP (blood pressure) < 140/90      Leukocytosis 6/11/2012     Moderate depressive episode (H)      MS (multiple sclerosis) (H) 2003    Dr Vigil/Gaby - [facility]     Multiple sclerosis (H)      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     Tobacco abuse 06/11/2012    former     Type 2 diabetes, HbA1c goal < 7% (H)      Exam:  BP (!) 143/66   Pulse 92   Temp 97.6  F (36.4  C) (Temporal)   Resp 18   LMP 12/11/2011   Wound (used by OP [facility] only) 07/09/19 1527 Left coccyx pressure injury (Active)   Length (cm) 0.3 12/18/2019  3:00 PM   Width (cm) 0.2 12/18/2019  3:00 PM   Depth (cm) 0.9 12/18/2019  3:00 PM   Wound (cm^2) 0.06 cm^2 12/18/2019  3:00 PM   Wound Volume (cm^3) 0.05 cm^3 12/18/2019  3:00 PM   Wound healing % 98.18 12/18/2019  3:00 PM   Dressing Appearance moist drainage 12/18/2019  3:00 PM   Drainage Characteristics/Odor serosanguineous 12/18/2019  3:00 PM   Drainage  Amount moderate 12/18/2019  3:00 PM   Thickness/Stage full thickness 12/18/2019  3:00 PM   Base red/granulating 12/18/2019  3:00 PM   Periwound intact 12/18/2019  3:00 PM   Periwound Temperature warm 12/18/2019  3:00 PM   Periwound Skin Turgor soft 12/18/2019  3:00 PM   Edges open 12/18/2019  3:00 PM   Care, Wound non-select wound debridement performed 12/18/2019  3:00 PM     54-year-old female, multiple sclerosis, nonambulatory, non-elastic wraps on both lower extremities for management of lower extremity edema.  Issue tuberosity ulceration, left, examined, has approximately 15 mm of depth, no purulent drainage, progressive resolution.  Inner aspects of the thighs reviewed, skin irritation with areas of desquamation and inflammation.  Appears related to the current white-cream being utilized for management.  We will cease this.      Impression: Multiple sclerosis, nonambulatory, can stand to assist in transfers, left issue tuberosity ulceration, skin irritation at medial thighs.  Denies diarrhea or urinary incontinence    Plan: We will dress the wounds with pleura gel application on a daily basis, wash wounds with Prontosan, continue EdemaWear utilization on legs under in elastic wraps.  She is on a Brown flavonoid, and vitamin D supplementation and Chris.  Patient will return to the clinic in 4 weeks time    Patient will require a roho wheelchair cushion due to Multiple sclerosis, nonambulatory, can only do pivot transfers, left ischial tuberosity ulceration, skin irritation at medial thighs from pressure and friction/shear. She will use in her wheelchair.     Robert Eugene MD on 12/18/2019 at 4:14 PM

## 2019-12-18 NOTE — DISCHARGE INSTRUCTIONS
[facility]  [address] [facility] [address], [facility] [address]  Appointment Phone [phone]     Amanda Richardson      1965    Get Junaid 4' cushion from [employer] or [employer]  Please add in 1 packet of Chris Supplement TWICE a day until your next appointment  Stephanie Calderon H[employer] Cardiovascular Support Blood Health Vascular 1 tablettwice daily    Wound Dressing Change:Coccyx wound  After cleansing with saline or wound cleanser, apply small amount of VASHE on gauze, lay into wound bed, let sit for 10 minutes, remove gauze (do not rinse) then apply dressing.  Apply Plurogel into wound base   Cover wound with light dressing  Change dressing daily    Wound Dressing Change:Left Posterior Lower Leg  Cover wound with plurogel and light dressing daily  Compression:Ok to wear edemawear during the day under velcro wraps  You have a compression lymphedema velcro wraps is supposed to be on during the day  and wear EDEMAWEAR at night  Please remove compression dressing if toes turn blue and/or tingle and can not be  relieved by raising the leg for one hour.       ARTHUR Eugene M.D.. December 18, 2019    Call us at [phone] if you have any questions about your wounds, have redness or swelling around your wound, have a fever of 101 or greater or if you have any other problems or concerns. We answer the phone Monday through Friday 8 am to 4 pm, please leave a message as we check the voicemail frequently throughout the day.     Follow up with Provider - 4 weeks

## 2019-12-19 NOTE — ADDENDUM NOTE
Encounter addended by: Cheryl Lakhani RN on: 12/19/2019 7:17 AM   Actions taken: Charge Capture section accepted

## 2019-12-20 NOTE — ADDENDUM NOTE
Encounter addended by: Cheryl Lakhani RN on: 12/20/2019 8:26 AM   Actions taken: Clinical Note Signed

## 2019-12-23 ENCOUNTER — MYC REFILL (OUTPATIENT)
Dept: FAMILY MEDICINE | Facility: CLINIC | Age: 54
End: 2019-12-23

## 2019-12-23 DIAGNOSIS — G35 MS (MULTIPLE SCLEROSIS) (H): ICD-10-CM

## 2019-12-23 DIAGNOSIS — G89.4 CHRONIC PAIN SYNDROME: ICD-10-CM

## 2019-12-23 RX ORDER — DULOXETIN HYDROCHLORIDE 60 MG/1
60 CAPSULE, DELAYED RELEASE ORAL 2 TIMES DAILY
Qty: 180 CAPSULE | Refills: 3 | Status: CANCELLED | OUTPATIENT
Start: 2019-12-23

## 2019-12-23 NOTE — TELEPHONE ENCOUNTER
Requested Prescriptions   Pending Prescriptions Disp Refills     Controlled Substance Refill Request for oxyCODONE IR (ROXICODONE) 15 MG tablet  Problem List Complete:    Yes    Last Written Prescription Date:  11.27.19  Last Fill Quantity: 120 tablet,   # refills: 0        Last Office Visit with [facility] provider: 10.30.19    Future Office visit:   Next 5 appointments (look out 90 days)    Stevie 15, 2020  3:15 PM CST  (Arrive by 3:00 PM)  Return Visit with Robert Eugene MD  [facility] [place] ([facility]) [address] [facility] [address] [place] [phone]  [phone]   Jan 29, 2020  3:50 PM CST  SHORT with Julius Hassan MD  [facility] [address] [place] [phone]  [phone]          Controlled substance agreement:   Encounter-Level CSA - 02/12/2018:    Controlled Substance Agreement - Scan on 2/28/2018 10:12 AM: CONTROLLED SUBSTANCE AGREEMENT     Encounter-Level CSA - 06/09/2016:    Controlled Substance Agreement - Scan on 6/17/2016 10:49 AM: CONTROLLED SUBSTANCE AGREEMENT     Encounter-Level CSA - 04/03/2015:    Controlled Substance Agreement - Scan on 4/12/2015 10:28 AM: Controlled Medication Agreement 04/03/15     Patient-Level CSA:    There are no patient-level csa.         Last Urine Drug Screen:   Pain Drug SCR UR W RPTD Meds   Date Value Ref Range Status   11/05/2019 FINAL  Final     Comment:     (Note)  ====================================================================  TOXASSURE COMP DRUG ANALYSIS,UR  ====================================================================  Test                             Result       Flag       Units        Drug Present   Oxycodone                      2463                    ng/mg creat   Oxymorphone                    4415                    ng/mg creat   Noroxycodone                   4391                    ng/mg creat   Noroxymorphone                  1432                    ng/mg creat    Sources of oxycodone are scheduled prescription medications.    Oxymorphone, noroxycodone, and noroxymorphone are expected    metabolites of oxycodone. Oxymorphone is also available as a    scheduled prescription medication.   Gabapentin                     PRESENT                               Duloxetine                     PRESENT                               Salicylate                     PRESENT                               Metoprolol                     PRESENT                              ====================================================================  Test                      Result    Flag   Units      Ref Range        Creatinine              97               mg/dL      >=20            ====================================================================  Declared Medications:  Medication list was not provided.  ====================================================================  For clinical consultation, please call [phone].  ====================================================================  Analysis performed by [employer] [place] [address]     , No results found for: COMDAT, No results found for: THC13, PCP13, COC13, MAMP13, OPI13, AMP13, BZO13, TCA13, MTD13, BAR13, OXY13, PPX13, BUP13     Processing:  Fax Rx to listed pharmacy    [email or web address]   checked in past 3 months?  No, route to RN                    DULoxetine (CYMBALTA) 60 MG capsule          Last Written Prescription Date:  10.30.19  Last Fill Quantity: 180 capsule,  # refills: 3   Last office visit: 10/30/2019 with prescribing provider:  Julius Hassan MD               Future Office Visit:   Next 5 appointments (look out 90 days)    Stevie 15, 2020  3:15 PM CST  (Arrive by 3:00 PM)  Return Visit with Robert Eugene MD  [facility] [place] ([facility]) [address]  [address]  [phone]  [phone]   Jan 29, 2020  3:50 PM CST  SHORT with Julius Hassan MD  [facility] [address] [place] [phone]            180 capsule 3     Sig: Take 1 capsule (60 mg) by mouth 2 times daily       Serotonin-Norepinephrine Reuptake Inhibitors  Failed - 12/23/2019  1:14 PM        Failed - Blood pressure under 140/90 in past 12 months     BP Readings from Last 3 Encounters:   12/18/19 (!) 143/66   10/30/19 126/74   09/11/19 126/75                 Passed - Recent (12 mo) or future (30 days) visit within the authorizing provider's specialty     Patient has had an office visit with the authorizing provider or a provider within the authorizing providers department within the previous 12 mos or has a future within next 30 days. See \"Patient Info\" tab in inbasket, or \"Choose Columns\" in Meds & Orders section of the refill encounter.              Passed - Medication is active on med list        Passed - Patient is age 18 or older        Passed - No active pregnancy on record        Passed - No positive pregnancy test in past 12 months        "

## 2019-12-23 NOTE — ADDENDUM NOTE
Encounter addended by: Robert Eugene MD on: 12/23/2019 11:10 AM   Actions taken: Cosign clinical note with attestation

## 2019-12-26 RX ORDER — OXYCODONE HYDROCHLORIDE 15 MG/1
15 TABLET ORAL EVERY 6 HOURS PRN
Qty: 120 TABLET | Refills: 0 | Status: SHIPPED | OUTPATIENT
Start: 2019-12-30 | End: 2020-01-27

## 2019-12-26 NOTE — TELEPHONE ENCOUNTER
Pt has refills of Cymbalta available at the pharmacy.   Routing to PCP for review of controlled refill. Post dated to reflect appropriate fill. Early fill last month due to holiday.    Anival Goodwin RN   [facility]

## 2019-12-27 NOTE — TELEPHONE ENCOUNTER
Pt calling in to question refill of medication. Pt advised of previous documentation as no early refills. Pt advised of refill for cymbalta is on file at pharmacy. Patient stated an understanding and agreed with plan.    Anival Goodwin RN   [facility]

## 2019-12-29 ENCOUNTER — NURSE TRIAGE (OUTPATIENT)
Dept: NURSING | Facility: CLINIC | Age: 54
End: 2019-12-29

## 2019-12-29 NOTE — TELEPHONE ENCOUNTER
Pt calling stating she is out of her medication and wondering if the on call provider can be paged for a refill I told her her prescription is at the pharmacy see copy of prescription below:  oxyCODONE IR (ROXICODONE) 15 MG tablet 120 tablet 0 12/30/2019  No   Sig - Route: Take 1 tablet (15 mg) by mouth every 6 hours as needed for moderate to severe pain Limit 4 tablet(s) per day. - Oral   Sent to pharmacy as: oxyCODONE IR (ROXICODONE) 15 MG tablet   Class: E-Prescribe   Earliest Fill Date: 12/30/2019   Notes to Pharmacy: Fill on/after 12/30   Order: [id number]   E-Prescribing Status: Receipt confirmed by pharmacy (12/26/2019  1:10 PM CST)     On the prescription it states do not fill until 12/30/19.  I told her I am not able to get the prescription filled any sooner for her than what it states in the chart.

## 2020-01-10 DIAGNOSIS — E78.5 HYPERLIPIDEMIA LDL GOAL <70: ICD-10-CM

## 2020-01-10 RX ORDER — ATORVASTATIN CALCIUM 10 MG/1
TABLET, FILM COATED ORAL
Qty: 90 TABLET | Refills: 0 | Status: SHIPPED | OUTPATIENT
Start: 2020-01-10 | End: 2020-01-29

## 2020-01-10 NOTE — TELEPHONE ENCOUNTER
"Requested Prescriptions   Pending Prescriptions Disp Refills     atorvastatin (LIPITOR) 10 MG tablet [Pharmacy Med Name: ATORVASTATIN 10MG TABLETS] 90 tablet 0     Sig: TAKE 1 TABLET(10 MG) BY MOUTH DAILY       Last Refill:    Disp Refills Start End AISHA   atorvastatin (LIPITOR) 10 MG tablet 90 tablet 0 10/14/2019  No   Sig - Route: Take 1 tablet (10 mg) by mouth daily - Oral     Statins Protocol Passed - 1/10/2020  3:35 AM        Passed - LDL on file in past 12 months     Recent Labs   Lab Test 08/03/19  0930   LDL 71           Passed - No abnormal creatine kinase in past 12 months     Recent Labs   Lab Test 08/03/19  0930   CKT 37            Passed - Recent (12 mo) or future (30 days) visit within the authorizing provider's specialty     Patient has had an office visit with the authorizing provider or a provider within the authorizing providers department within the previous 12 mos or has a future within next 30 days. See \"Patient Info\" tab in inbasket, or \"Choose Columns\" in Meds & Orders section of the refill encounter.      LOV: 07/29/2019          Passed - Medication is active on med list        Passed - Patient is age 18 or older        Passed - No active pregnancy on record        Passed - No positive pregnancy test in past 12 months        07/29/2019 OV Notes:   Follow up in 3 months for medication recheck visit.     Next 5 appointments (look out 90 days)        Jan 29, 2020  3:50 PM CST  SHORT with Julius Hassan MD  [facility]) [address] [place] [phone]        Medication is being filled for 1 time refill only due to:  Patient needs to be seen because due for med check.    Ivanna Kelly RN  [facility]  "

## 2020-01-15 ENCOUNTER — HOSPITAL ENCOUNTER (OUTPATIENT)
Dept: WOUND CARE | Facility: CLINIC | Age: 55
Discharge: HOME | End: 2020-01-15
Attending: SURGERY | Admitting: SURGERY
Payer: COMMERCIAL

## 2020-01-15 VITALS
DIASTOLIC BLOOD PRESSURE: 79 MMHG | TEMPERATURE: 97.8 F | RESPIRATION RATE: 19 BRPM | HEART RATE: 91 BPM | SYSTOLIC BLOOD PRESSURE: 144 MMHG

## 2020-01-15 DIAGNOSIS — L98.491 SKIN ULCER, LIMITED TO BREAKDOWN OF SKIN (H): ICD-10-CM

## 2020-01-15 PROCEDURE — G0463 HOSPITAL OUTPT CLINIC VISIT: HCPCS

## 2020-01-15 PROCEDURE — 99212 OFFICE O/P EST SF 10 MIN: CPT | Performed by: SURGERY

## 2020-01-15 NOTE — DISCHARGE INSTRUCTIONS
[facility] [place]  [address] [facility] [place] [address]  Appointment Phone [phone]   Amanda Richardson      1965  Your wound is Healed!! Dressings are no longer required.   Apply Sween 24 Lotion for moisturizer      ARTHUR Eugene M.D.. January 15, 2020    Reducing a Patient s Risk for Pressure Injuries  There is no single preventive for pressure injuries. Give priority to pressure relief. Reduce other risks to help maintain a healthy flow of nutrients to the patient s skin.  Relieve pressure  Relieving pressure is the single most important factor in preventing and treating pressure injuries.  You can relieve pressure and restore the skin s blood supply by repositioning the patient and using special devices. Post a schedule to remind you to reposition the patient -- from side to back or from stomach to side. Make minor position changes even more frequently. Specially designed pillows, beds, or mattresses can also help reduce pressure.  In a bed    Use pillows under calves to elevate the legs from above the knees to the ankles.    Alter the angles of arms and legs.  In a wheelchair    Be sure the wheelchair is the proper size for the patient to give optimal support. For example, if the seat it too narrow, it will put extra pressure on the hips.    Cushion the back and buttocks with pillows or wheelchair cushions, and pad the footrest.    Use a special wheelchair cushion that is designed to distribute weight evenly and relieve pressure points that is evaluated yearly.    Have special cushions tested and adjusted at the proper times as recommended by the . This will allow the pressure relief to remain effective.   Manage moisture  Keep skin clean and lubricated, but free of excess moisture. Put the patient on a regular toilet schedule. Use incontinent devices, if appropriate. Consult with a doctor about using diarrhea medicines:    Use talc-free powders or  barrier creams.    Pat skin dry after bathing.    Lubricate skin with lotion.  Reduce shear and friction  Prevent skin breakdown by reducing friction and shear. Patients are less likely to slide down in bed if they re supported by pillows and the head of the bed isn t raised too high. During bed or wheelchair transfers, lift--don t drag--the patient. If you can t do this alone, get help. And be sure to use assistive devices, whenever possible.  In a bed    Use draw-sheets or transfer boards to move patients.    Clean and smooth the bed surface.    Lift the head of the bed no more than 30 .    Raise the foot of the bed slightly.  In a wheelchair    Support the patient s back with a pillow.    Use a foot extension.

## 2020-01-15 NOTE — PROGRESS NOTES
[facility] [place] Progress Note    Subject: Amanda Richardson multiple sclerosis, closed lower extremity ulcerations and sacral ulceration, her  is performed dressing changes and has done an excellent job.  Patient is a nonambulator for the past 4 years due to multiple sclerosis.    Patient Active Problem List   Diagnosis     MS (multiple sclerosis) (H)     Non Hodgkin's lymphoma (H)     Leukocytosis     Gallstone     Hypertension goal BP (blood pressure) < 140/90     Spinal stenosis, lumbar     Abnormality of gait     Pain medication agreement- signed Nov 20,2012     Lumbar radiculopathy     Colon polyps     Neuralgia, neuritis, and radiculitis, unspecified     Encounter for long-term current use of medication     Health Care Home     Moderate depressive episode (H)     Leg muscle spasm     Chronic pain syndrome     Controlled substance agreement signed     T-cell lymphoma (H)     Type 2 diabetes, HbA1c goal < 7% (H)     Hyperlipidemia LDL goal <70     CKD (chronic kidney disease) stage 1, GFR 90 ml/min or greater     Type 2 diabetes mellitus with stage 1 chronic kidney disease, without long-term current use of insulin (H)     Morbid obesity (H)     Acute hypercapnic respiratory failure (H)     Sepsis (H)     S/P right hip fracture     Lymphedema     Past Medical History:   Diagnosis Date     Abnormality of gait 7/27/2012     Arrhythmia     with sepsis     Chronic pain     [facility] - yearly, next in summer 2018     CKD (chronic kidney disease) stage 1, GFR 90 ml/min or greater     kidney stones     Colon polyps 1/15    tubular adenomas x 2     Depressive disorder      Gallstone 6/11/2012     Hyperlipidemia LDL goal <70      Hypertension goal BP (blood pressure) < 140/90      Leukocytosis 6/11/2012     Moderate depressive episode (H)      MS (multiple sclerosis) (H) 2003    Dr Vigil/Gaby - [facility]     Multiple sclerosis (H)      Non Hodgkin's lymphoma (H) 06/11/2012    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  She does have a  advanced proprietary lymphedema pump at home, Flexitouch plus, requires reeducation, advised to utilize for 1 to 2 hours on a daily basis lifelong.      Impression: Closed wounds, sacrum, issue tuberosity, posterior thighs lymphedema primary tarda    Plan: Sween cream daily.  She showers in a sitting chair, I suspect she cannot shower the areas where she is sitting due to her nonambulatory status and ability to stand, I recommended that her  help her in the shower to achieve excellent hygiene in the perianal region and minimize recidivism risk.  Application of Sween cream on a daily basis.  She does have a  proprietary advanced lymphedema pump, requires reeducation, we have contacted the tactile rep to reeducate as she would certainly benefit from utilization 1 to 2 hours a day given the significance of  her lower extremity lymphedema.  Patient return to clinic as needed.  Robert Eugene MD on 1/15/2020 at 5:31 PM

## 2020-01-15 NOTE — PROGRESS NOTES
Patient arrived for wound care visit. Certified Wound Care Nurse time spent evaluating patient record, completed a full evaluation and documented healed wound(s) & romeo-wound skin; provided recommendation based on treatment plan. Applied dressing, reviewed discharge instructions, patient education, and discussed plan of care with appropriate medical team staff members and patient and/or family members.

## 2020-01-27 ENCOUNTER — MYC REFILL (OUTPATIENT)
Dept: FAMILY MEDICINE | Facility: CLINIC | Age: 55
End: 2020-01-27

## 2020-01-27 DIAGNOSIS — G35 MS (MULTIPLE SCLEROSIS) (H): ICD-10-CM

## 2020-01-27 DIAGNOSIS — G89.4 CHRONIC PAIN SYNDROME: ICD-10-CM

## 2020-01-27 RX ORDER — OXYCODONE HYDROCHLORIDE 15 MG/1
15 TABLET ORAL EVERY 6 HOURS PRN
Qty: 120 TABLET | Refills: 0 | Status: SHIPPED | OUTPATIENT
Start: 2020-01-29 | End: 2020-02-24

## 2020-01-27 NOTE — TELEPHONE ENCOUNTER
Controlled Substance Refill Request for Oxycodone  Problem List Complete:    Yes    Last Written Prescription Date:  12/30/2019  Last Fill Quantity: 120,   # refills: 0      Last Office Visit with G primary care provider: 10/30/2019    Future Office visit:   Next 5 appointments (look out 90 days)    Jan 29, 2020  3:50 PM CST  SHORT with Julius Hassan MD  [facility] [address] [place] [phone]          Controlled substance agreement:   Encounter-Level CSA - 02/12/2018:    Controlled Substance Agreement - Scan on 2/28/2018 10:12 AM: CONTROLLED SUBSTANCE AGREEMENT     Encounter-Level CSA - 06/09/2016:    Controlled Substance Agreement - Scan on 6/17/2016 10:49 AM: CONTROLLED SUBSTANCE AGREEMENT     Encounter-Level CSA - 04/03/2015:    Controlled Substance Agreement - Scan on 4/12/2015 10:28 AM: Controlled Medication Agreement 04/03/15     Patient-Level CSA:    There are no patient-level csa.         Last Urine Drug Screen:   Pain Drug SCR UR W RPTD Meds   Date Value Ref Range Status   11/05/2019 FINAL  Final     Comment:     (Note)  ====================================================================  TOXASSURE COMP DRUG ANALYSIS,UR  ====================================================================  Test                             Result       Flag       Units        Drug Present   Oxycodone                      2463                    ng/mg creat   Oxymorphone                    4415                    ng/mg creat   Noroxycodone                   4391                    ng/mg creat   Noroxymorphone                 1432                    ng/mg creat    Sources of oxycodone are scheduled prescription medications.    Oxymorphone, noroxycodone, and noroxymorphone are expected    metabolites of oxycodone. Oxymorphone is also available as a    scheduled prescription medication.   Gabapentin                     PRESENT                                Duloxetine                     PRESENT                               Salicylate                     PRESENT                               Metoprolol                     PRESENT                              ====================================================================  Test                      Result    Flag   Units      Ref Range        Creatinine              97               mg/dL      >=20            ====================================================================  Declared Medications:  Medication list was not provided.  ====================================================================  For clinical consultation, please call [phone].  ====================================================================  Analysis performed by [facility] [place] [address]     , No results found for: COMDAT, No results found for: THC13, PCP13, COC13, MAMP13, OPI13, AMP13, BZO13, TCA13, MTD13, BAR13, OXY13, PPX13, BUP13     Processing:  Rx to be electronically transmitted to pharmacy by provider     [email or web address]   checked in past 3 months?  Yes 1/27/2020, no concerns noted          Routing refill request to provider for review/approval because:  Drug not on the [facility] refill protocol       Chandrika Nathan BS, RN, PHN  [facility]  Office: [phone]  Fax: [phone]

## 2020-01-29 ENCOUNTER — OFFICE VISIT (OUTPATIENT)
Dept: FAMILY MEDICINE | Facility: CLINIC | Age: 55
End: 2020-01-29
Payer: COMMERCIAL

## 2020-01-29 VITALS
HEIGHT: 67 IN | SYSTOLIC BLOOD PRESSURE: 118 MMHG | DIASTOLIC BLOOD PRESSURE: 86 MMHG | WEIGHT: 252 LBS | HEART RATE: 86 BPM | TEMPERATURE: 98.3 F | OXYGEN SATURATION: 91 % | BODY MASS INDEX: 39.55 KG/M2

## 2020-01-29 DIAGNOSIS — N18.1 TYPE 2 DIABETES MELLITUS WITH STAGE 1 CHRONIC KIDNEY DISEASE, WITHOUT LONG-TERM CURRENT USE OF INSULIN (H): ICD-10-CM

## 2020-01-29 DIAGNOSIS — Z23 NEED FOR PNEUMOCOCCAL VACCINATION: ICD-10-CM

## 2020-01-29 DIAGNOSIS — Z51.81 MEDICATION MONITORING ENCOUNTER: ICD-10-CM

## 2020-01-29 DIAGNOSIS — E11.9 TYPE 2 DIABETES, HBA1C GOAL < 7% (H): ICD-10-CM

## 2020-01-29 DIAGNOSIS — M48.061 SPINAL STENOSIS OF LUMBAR REGION, UNSPECIFIED WHETHER NEUROGENIC CLAUDICATION PRESENT: ICD-10-CM

## 2020-01-29 DIAGNOSIS — Z23 NEED FOR SHINGLES VACCINE: ICD-10-CM

## 2020-01-29 DIAGNOSIS — E66.01 MORBID OBESITY (H): ICD-10-CM

## 2020-01-29 DIAGNOSIS — Z12.31 ENCOUNTER FOR SCREENING MAMMOGRAM FOR MALIGNANT NEOPLASM OF BREAST: ICD-10-CM

## 2020-01-29 DIAGNOSIS — N18.1 CKD (CHRONIC KIDNEY DISEASE) STAGE 1, GFR 90 ML/MIN OR GREATER: ICD-10-CM

## 2020-01-29 DIAGNOSIS — Z23 NEED FOR INFLUENZA VACCINATION: ICD-10-CM

## 2020-01-29 DIAGNOSIS — Z87.81 S/P RIGHT HIP FRACTURE: ICD-10-CM

## 2020-01-29 DIAGNOSIS — I89.0 LYMPHEDEMA: ICD-10-CM

## 2020-01-29 DIAGNOSIS — C85.81 OTHER SPECIFIED TYPE OF NON-HODGKIN LYMPHOMA OF HEAD (H): ICD-10-CM

## 2020-01-29 DIAGNOSIS — E11.22 TYPE 2 DIABETES MELLITUS WITH STAGE 1 CHRONIC KIDNEY DISEASE, WITHOUT LONG-TERM CURRENT USE OF INSULIN (H): ICD-10-CM

## 2020-01-29 DIAGNOSIS — E78.5 HYPERLIPIDEMIA LDL GOAL <70: ICD-10-CM

## 2020-01-29 DIAGNOSIS — Z12.11 SCREEN FOR COLON CANCER: ICD-10-CM

## 2020-01-29 DIAGNOSIS — I10 HYPERTENSION GOAL BP (BLOOD PRESSURE) < 140/90: ICD-10-CM

## 2020-01-29 DIAGNOSIS — Z13.820 SCREENING FOR OSTEOPOROSIS: ICD-10-CM

## 2020-01-29 DIAGNOSIS — C85.90 T-CELL LYMPHOMA (H): ICD-10-CM

## 2020-01-29 DIAGNOSIS — G89.4 CHRONIC PAIN SYNDROME: ICD-10-CM

## 2020-01-29 DIAGNOSIS — Z78.0 ASYMPTOMATIC MENOPAUSAL STATE: ICD-10-CM

## 2020-01-29 DIAGNOSIS — M54.16 LUMBAR RADICULOPATHY: ICD-10-CM

## 2020-01-29 DIAGNOSIS — F32.A MODERATE DEPRESSIVE EPISODE: ICD-10-CM

## 2020-01-29 DIAGNOSIS — Z12.39 SCREENING FOR BREAST CANCER: ICD-10-CM

## 2020-01-29 DIAGNOSIS — G35 MS (MULTIPLE SCLEROSIS) (H): Primary | ICD-10-CM

## 2020-01-29 PROCEDURE — 90471 IMMUNIZATION ADMIN: CPT | Performed by: FAMILY MEDICINE

## 2020-01-29 PROCEDURE — G0009 ADMIN PNEUMOCOCCAL VACCINE: HCPCS | Mod: 59 | Performed by: FAMILY MEDICINE

## 2020-01-29 PROCEDURE — 90732 PPSV23 VACC 2 YRS+ SUBQ/IM: CPT | Performed by: FAMILY MEDICINE

## 2020-01-29 PROCEDURE — G0008 ADMIN INFLUENZA VIRUS VAC: HCPCS | Mod: 59 | Performed by: FAMILY MEDICINE

## 2020-01-29 PROCEDURE — 90750 HZV VACC RECOMBINANT IM: CPT | Performed by: FAMILY MEDICINE

## 2020-01-29 PROCEDURE — 90682 RIV4 VACC RECOMBINANT DNA IM: CPT | Performed by: FAMILY MEDICINE

## 2020-01-29 PROCEDURE — 99207 C FOOT EXAM  NO CHARGE: CPT | Mod: 25 | Performed by: FAMILY MEDICINE

## 2020-01-29 PROCEDURE — 99214 OFFICE O/P EST MOD 30 MIN: CPT | Mod: 25 | Performed by: FAMILY MEDICINE

## 2020-01-29 RX ORDER — ATORVASTATIN CALCIUM 10 MG/1
10 TABLET, FILM COATED ORAL DAILY
Qty: 90 TABLET | Refills: 3 | Status: SHIPPED | OUTPATIENT
Start: 2020-01-29 | End: 2021-01-01

## 2020-01-29 ASSESSMENT — PATIENT HEALTH QUESTIONNAIRE - PHQ9
SUM OF ALL RESPONSES TO PHQ QUESTIONS 1-9: 6
5. POOR APPETITE OR OVEREATING: NOT AT ALL

## 2020-01-29 ASSESSMENT — ANXIETY QUESTIONNAIRES
6. BECOMING EASILY ANNOYED OR IRRITABLE: SEVERAL DAYS
7. FEELING AFRAID AS IF SOMETHING AWFUL MIGHT HAPPEN: NOT AT ALL
3. WORRYING TOO MUCH ABOUT DIFFERENT THINGS: NOT AT ALL
GAD7 TOTAL SCORE: 1
IF YOU CHECKED OFF ANY PROBLEMS ON THIS QUESTIONNAIRE, HOW DIFFICULT HAVE THESE PROBLEMS MADE IT FOR YOU TO DO YOUR WORK, TAKE CARE OF THINGS AT HOME, OR GET ALONG WITH OTHER PEOPLE: NOT DIFFICULT AT ALL
1. FEELING NERVOUS, ANXIOUS, OR ON EDGE: NOT AT ALL
2. NOT BEING ABLE TO STOP OR CONTROL WORRYING: NOT AT ALL
5. BEING SO RESTLESS THAT IT IS HARD TO SIT STILL: NOT AT ALL

## 2020-01-29 ASSESSMENT — MIFFLIN-ST. JEOR: SCORE: 1765.69

## 2020-01-29 NOTE — PROGRESS NOTES
[facility]  [place]    SUBJECTIVE    Amanda Richardson is a 54 year old female who presents to clinic today for the following health issues:    3 Month follow up:    Chronic Pain - lumbar stenosis / s/p right hip    MS - stable - Dr Vigil/Gaby  2  Posterior pressure wounds - healing well - wound care completed    Depression/Anxiety - PHQ = 6, REJI= 1    Diabetes Follow-up      How often are you checking your blood sugar? Not at all    What concerns do you have today about your diabetes? None     Do you have any of these symptoms? (Select all that apply)  NA    Have you had a diabetic eye exam in the last 12 months? No - pt advised to get one scheduled    CKD/Htn/Lipids    Creatinine   Date Value Ref Range Status   08/03/2019 0.47 (L) 0.52 - 1.04 mg/dL Final     BP Readings from Last 3 Encounters:   01/29/20 118/86   01/15/20 (!) 144/79   12/18/19 (!) 143/66     Lab Results   Component Value Date    A1C 6.4 08/03/2019    A1C 6.3 03/17/2019    A1C 6.6 02/09/2019    A1C 6.4 01/30/2019    A1C 6.8 06/20/2018                 How many servings of fruits and vegetables do you eat daily?  4 or more    On average, how many sweetened beverages do you drink each day (Examples: soda, juice, sweet tea, etc.  Do NOT count diet or artificially sweetened beverages)?   0    How many days per week do you exercise enough to make your heart beat faster? limited    How many minutes a day do you exercise enough to make your heart beat faster? na    How many days per week do you miss taking your medication? 0    Pain in right shoulder and neck X 2 weeks - no known injury.  Has tried ice, heat, patches with no relief - no redness, no swelling, no warmth, some zingers, strength ok    Reviewed and updated as needed this visit by Provider    BP Readings from Last 3 Encounters:   01/29/20 118/86   01/15/20 (!) 144/79   12/18/19 (!) 143/66     body mass index is 39.47 kg/m .    Wt Readings from Last 4 Encounters:   01/29/20 114.3  kg (252 lb)   07/29/19 114.3 kg (252 lb)   07/17/19 114.3 kg (252 lb)   06/21/19 114.3 kg (252 lb)       Health Maintenance    Health Maintenance Due   Topic Date Due     DIABETIC FOOT EXAM  1965     EYE EXAM  1965     FIT  05/08/1973     PNEUMOCOCCAL IMMUNIZATION 19-64 HIGHEST RISK (2 of 3 - PCV13) 01/05/2012     MEDICARE ANNUAL WELLNESS VISIT  05/02/2017     DEXA  06/26/2017     HPV TEST  05/02/2019     PAP  05/02/2019     MAMMO SCREENING  07/09/2019     INFLUENZA VACCINE (1) 09/01/2019     PHQ-9  12/21/2019     ZOSTER IMMUNIZATION (2 of 2) 09/23/2019     A1C  02/03/2020       Current Problem List    Patient Active Problem List   Diagnosis     MS (multiple sclerosis) (H)     Non Hodgkin's lymphoma (H)     Leukocytosis     Gallstone     Hypertension goal BP (blood pressure) < 140/90     Spinal stenosis of lumbar region, unspecified whether neurogenic claudication present     Abnormality of gait     Pain medication agreement- signed Nov 20,2012     Lumbar radiculopathy     Colon polyps     Neuralgia, neuritis, and radiculitis, unspecified     Encounter for long-term current use of medication     Health Care Home     Moderate depressive episode (H)     Leg muscle spasm     Chronic pain syndrome     Controlled substance agreement signed     T-cell lymphoma (H)     Type 2 diabetes, HbA1c goal < 7% (H)     Hyperlipidemia LDL goal <70     CKD (chronic kidney disease) stage 1, GFR 90 ml/min or greater     Type 2 diabetes mellitus with stage 1 chronic kidney disease, without long-term current use of insulin (H)     Morbid obesity (H)     Acute hypercapnic respiratory failure (H)     Sepsis (H)     S/P right hip fracture     Lymphedema       Past Medical History    Past Medical History:   Diagnosis Date     Abnormality of gait 7/27/2012     Arrhythmia     with sepsis     Chronic pain     [facility] - yearly, next in summer 2018     CKD (chronic kidney disease) stage 1, GFR 90 ml/min or greater     kidney  stones     Colon polyps 1/15    tubular adenomas x 2     Depressive disorder      Gallstone 6/11/2012     Hyperlipidemia LDL goal <70      Hypertension goal BP (blood pressure) < 140/90      Leukocytosis 6/11/2012     Moderate depressive episode (H)      MS (multiple sclerosis) (H) 2003    Dr Vigil/Gaby - [facility]     Multiple sclerosis (H)      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 Cystoscopy 2. LEFT retrograde pyelogram 3. LEFT JJ stent placement 4. <1hr physician fluoroscopy time;  Surgeon: Epifanio Sapp MD;  Location: [facility] OR     COMBINED CYSTOSCOPY, RETROGRADES, URETEROSCOPY, LASER HOLMIUM LITHOTRIPSY URETER(S), INSERT STENT Left 3/15/2019    Procedure: Cystoscopy, left ureteral stent exchange, left retrograde pyelogram, interpretation of fluoroscopic images, left ureteroscopy with holmium lithotripsy and stone basketing, 22 modifier for difficult lengthy case.;  Surgeon: Tien  Mayito Terrell MD;  Location: [facility] OR     CYSTOSCOPY       CYSTOSCOPY, REMOVE STENT(S), COMBINED Left 3/27/2019    Procedure: Flexible cystoscopy with left ureteral stent removal;  Surgeon: Mayito Chauhan MD;  Location: [facility] OR     FUSION LUMBAR ANTERIOR, FUSION LUMBAR POSTERIOR TWO LEVELS, COMBINED  10/17/2013    lumbar fusion - Dr Floyd     INSERT PUMP BACLOFEN  04/2017    intrathecal baclofen pump implantation     IRRIGATION AND DEBRIDEMENT LOWER EXTREMITY, COMBINED Right 2015    Right ankle I&D d/t infection     OPEN REDUCTION INTERNAL FIXATION ANKLE  5/15    Right Bimalleolar ankle fx ORIF     OPEN REDUCTION INTERNAL FIXATION ANKLE Right 11/2015    Revision due to spasms pulling screws out of ankle     SINUS SURGERY  2011    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     XR LUMBAR EPIDURAL INJECTION INCL IMAGING  3/14    Left L4-5 Epidural Dr Winter       Current Medications    Current Outpatient Medications   Medication Sig Dispense Refill     acetaminophen (TYLENOL) 325 MG tablet Take 2 tablets (650 mg) by mouth every 4 hours as needed for mild pain       acetic acid 0.25 % irrigation Apply topically daily 250 mL 0     albuterol (PROVENTIL) (2.5 MG/3ML) 0.083% neb solution Take 1 vial (2.5 mg) by nebulization once for 1 dose 25 vial 1     amitriptyline (ELAVIL) 100 MG tablet Take 1 tablet (100 mg) by mouth At Bedtime 90 tablet 3     aspirin (ASA) 81 MG chewable tablet Take 1 tablet (81 mg) by mouth daily       atorvastatin (LIPITOR) 10 MG tablet Take 1 tablet (10 mg) by mouth daily 90 tablet 3     baclofen (LIORESAL) 10 MG tablet Take 10 mg by mouth 3 times daily       blood glucose (ACCU-CHEK KEL) test strip Use to test blood sugar 1 times daily or as directed. 100 strip 5     blood glucose (NO BRAND SPECIFIED) lancets standard Use to test blood sugar 1 times daily or as directed. 100 each 5     blood glucose calibration (NO BRAND SPECIFIED) solution Use to calibrate blood glucose  monitor as needed as directed. 1 each 0     blood glucose monitoring (ACCU-CHEK KEL PLUS) meter device kit Use to test blood sugar 1 times daily or as directed. 1 kit 0     blood glucose monitoring (ACCU-CHEK MULTICLIX) lancets Use to test blood sugar 1 times daily or as directed. 100 each 5     DULoxetine (CYMBALTA) 60 MG capsule Take 1 capsule (60 mg) by mouth 2 times daily 180 capsule 3     gabapentin (NEURONTIN) 300 MG capsule Take 600 mg by mouth 3 times daily       losartan-hydrochlorothiazide (HYZAAR) 50-12.5 MG tablet Take 1 tablet by mouth daily 90 tablet 3     metFORMIN (GLUCOPHAGE) 500 MG tablet TAKE 1 TABLET(500 MG) BY MOUTH DAILY WITH DINNER 90 tablet 3     metoprolol succinate ER (TOPROL-XL) 50 MG 24 hr tablet Take 1 tablet (50 mg) by mouth daily 90 tablet 1     Multiple Vitamin (MULTI-VITAMIN PO) Take 1 tablet by mouth daily        naloxone (NARCAN) 4 MG/0.1ML nasal spray Spray 1 spray (4 mg) into one nostril alternating nostrils once as needed for opioid reversal Every 2-3 minutes until patient responsive or EMS arrives 0.2 mL 0     omega-3 fatty acids (FISH OIL) 1200 MG capsule Take 1 capsule by mouth daily.       oxyCODONE IR (ROXICODONE) 15 MG tablet Take 1 tablet (15 mg) by mouth every 6 hours as needed for moderate to severe pain Limit 4 tablet(s) per day. 120 tablet 0     senna-docusate (SENOKOT-S/PERICOLACE) 8.6-50 MG tablet Take 2 tablets by mouth daily as needed for constipation 180 tablet 0     vitamin D3 (VITAMIN D3) 1000 units (25 mcg) tablet Take 1 tablet (1,000 Units) by mouth daily         Allergies    Allergies   Allergen Reactions     Lisinopril      Lip swelling     Cyclobenzaprine Other (See Comments)     Per 4-10-17 H&P by Yanna Dugan PA-C.     Flexeril [Cyclobenzaprine Hcl]      Got confused        Immunizations    Immunization History   Administered Date(s) Administered     Influenza (H1N1) 12/10/2009     Influenza (IIV3) PF 12/10/2009, 11/06/2012, 12/08/2014     Influenza  Quad, Recombinant, p-free (RIV4) 2020     Influenza Vaccine IM > 6 months Valent IIV4 10/21/2013, 2018     Influenza Vaccine, 3 YRS +, IM (QUADRIVALENT W/PRESERVATIVES) 2017     Pneumococcal 23 valent 2011, 2020     TDAP Vaccine (Adacel) 2018     Tdap (Adacel,Boostrix) 2008     Zoster vaccine recombinant adjuvanted (SHINGRIX) 2019, 2020       Family History    Family History   Problem Relation Age of Onset     C.A.D. Father         with CHF      Cancer Father         ? unsure type - abdominal      Hypertension Mother      Thyroid Disease Mother         goiter      Breast Cancer Sister 58     Thyroid Disease Son      Cancer - colorectal No family hx of        Social History    Social History     Socioeconomic History     Marital status:      Spouse name: Fernando     Number of children: 3     Years of education: 14     Highest education level: Not on file   Occupational History     Employer: UNEMPLOYED   Social Needs     Financial resource strain: Not on file     Food insecurity:     Worry: Not on file     Inability: Not on file     Transportation needs:     Medical: Not on file     Non-medical: Not on file   Tobacco Use     Smoking status: Former Smoker     Packs/day: 0.50     Years: 0.00     Pack years: 0.00     Types: Cigarettes     Last attempt to quit: 2013     Years since quittin.4     Smokeless tobacco: Never Used     Tobacco comment: since age 17   Substance and Sexual Activity     Alcohol use: No     Drug use: No     Sexual activity: Yes     Partners: Male   Lifestyle     Physical activity:     Days per week: Not on file     Minutes per session: Not on file     Stress: Not on file   Relationships     Social connections:     Talks on phone: Not on file     Gets together: Not on file     Attends Voodoo service: Not on file     Active member of club or organization: Not on file     Attends meetings of clubs or organizations: Not on file      "Relationship status: Not on file     Intimate partner violence:     Fear of current or ex partner: Not on file     Emotionally abused: Not on file     Physically abused: Not on file     Forced sexual activity: Not on file   Other Topics Concern     Parent/sibling w/ CABG, MI or angioplasty before 65F 55M? No      Service Not Asked     Blood Transfusions Not Asked     Caffeine Concern Yes     Comment: occas     Occupational Exposure Not Asked     Hobby Hazards Not Asked     Sleep Concern Not Asked     Stress Concern Not Asked     Weight Concern Not Asked     Special Diet Not Asked     Back Care Not Asked     Exercise Yes     Comment: as able     Bike Helmet Not Asked     Seat Belt Yes     Self-Exams Not Asked   Social History Narrative     Not on file       All above reviewed and updated, all stable unless otherwise noted    Recent labs reviewed    ROS    CONSTITUTIONAL: NEGATIVE for fever, chills, change in weight  INTEGUMENTARY/SKIN: NEGATIVE for worrisome rashes, moles or lesions  EYES: NEGATIVE for vision changes or irritation  ENT/MOUTH: NEGATIVE for ear, mouth and throat problems  RESP: NEGATIVE for significant cough or SOB  CV: NEGATIVE for chest pain, palpitations or peripheral edema  GI: NEGATIVE for nausea, abdominal pain, heartburn, or change in bowel habits  : NEGATIVE for frequency, dysuria, or hematuria  MUSCULOSKELETAL: NEGATIVE for significant arthralgias or myalgia  NEURO: NEGATIVE for weakness, dizziness or paresthesias  ENDOCRINE: NEGATIVE for temperature intolerance, skin/hair changes  HEME: NEGATIVE for bleeding problems  PSYCHIATRIC: NEGATIVE for changes in mood or affect    OBJECTIVE    /86   Pulse 86   Temp 98.3  F (36.8  C) (Oral)   Ht 1.702 m (5' 7\")   Wt 114.3 kg (252 lb)   LMP 12/11/2011   SpO2 91%   BMI 39.47 kg/m    Body mass index is 39.47 kg/m .  GENERAL: healthy, alert and no distress  EYES: Eyes grossly normal to inspection, extraocular movements - intact, and " PERRL  HENT: ear canals- normal; TMs- normal; Nose- normal; Mouth- no ulcers, no lesions  NECK: no tenderness, no adenopathy, no asymmetry, no masses, no stiffness; thyroid- normal to palpation  RESP: lungs clear to auscultation - no rales, no rhonchi, no wheezes  CV: regular rates and rhythm, normal S1 S2, no S3 or S4 and no murmur, no click or rub -  ABDOMEN: soft, no tenderness, no  hepatosplenomegaly, no masses, normal bowel sounds  MS: extremities- no gross deformities noted, no edema  SKIN: no suspicious lesions, no rashes  NEURO: strength and tone- normal upper ext, sensory exam- grossly normal, mentation- intact, speech- normal, reflexes- symmetric - limited mobility, limited strength lower ext  BACK: no CVA tenderness, no paralumbar tenderness  PSYCH: Alert and oriented times 3; speech- coherent , normal rate and volume; able to articulate logical thoughts, able to abstract reason, no tangential thoughts, no hallucinations or delusions, affect- normal  LYMPHATICS: no cervical adenopathy  Foot exam negative per wound care clinic    DIAGNOSTICS/PROCEDURE    Pending     ASSESSMENT      ICD-10-CM    1. MS (multiple sclerosis) (H) G35 Comprehensive metabolic panel     CBC with platelets   2. Spinal stenosis of lumbar region, unspecified whether neurogenic claudication present M48.061    3. Lumbar radiculopathy M54.16    4. Chronic pain syndrome G89.4    5. Type 2 diabetes mellitus with stage 1 chronic kidney disease, without long-term current use of insulin (H) E11.22 Comprehensive metabolic panel    N18.1 Lipid panel reflex to direct LDL Fasting     UA reflex to Microscopic and Culture     Albumin Random Urine Quantitative with Creat Ratio     Hemoglobin A1c     FOOT EXAM     OPHTHALMOLOGY ADULT REFERRAL     CANCELED: Hemoglobin A1c   6. Type 2 diabetes, HbA1c goal < 7% (H) E11.9 Comprehensive metabolic panel     Lipid panel reflex to direct LDL Fasting     UA reflex to Microscopic and Culture     Albumin  Random Urine Quantitative with Creat Ratio     Hemoglobin A1c     FOOT EXAM     OPHTHALMOLOGY ADULT REFERRAL     CANCELED: Hemoglobin A1c   7. CKD (chronic kidney disease) stage 1, GFR 90 ml/min or greater N18.1 Comprehensive metabolic panel     CBC with platelets     UA reflex to Microscopic and Culture     Albumin Random Urine Quantitative with Creat Ratio   8. Hypertension goal BP (blood pressure) < 140/90 I10 Comprehensive metabolic panel     UA reflex to Microscopic and Culture     Albumin Random Urine Quantitative with Creat Ratio   9. Hyperlipidemia LDL goal <70 E78.5 Comprehensive metabolic panel     Lipid panel reflex to direct LDL Fasting     CK total     atorvastatin (LIPITOR) 10 MG tablet   10. 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 T-cell lymphoma (H) C85.90 Comprehensive metabolic panel     CBC with platelets   12. Moderate depressive episode (H) F32.1 TSH with free T4 reflex   13. S/P right hip fracture Z87.81 Comprehensive metabolic panel     DX Hip/Pelvis/Spine     Vitamin D Deficiency   14. Morbid obesity (H) E66.01    15. Lymphedema I89.0 Comprehensive metabolic panel   16. Medication monitoring encounter Z51.81 Comprehensive metabolic panel     Lipid panel reflex to direct LDL Fasting     CBC with platelets     CK total     TSH with free T4 reflex     UA reflex to Microscopic and Culture     Albumin Random Urine Quantitative with Creat Ratio     Fecal colorectal cancer screen FIT     Hemoglobin A1c     FOOT EXAM     MA Screening Digital Bilateral     DX Hip/Pelvis/Spine     GASTROENTEROLOGY ADULT REF PROCEDURE ONLY     OPHTHALMOLOGY ADULT REFERRAL     Vitamin D Deficiency     CANCELED: Hemoglobin A1c   17. Screening for osteoporosis Z13.820 DX Hip/Pelvis/Spine     Vitamin D Deficiency   18. Screening for breast cancer Z12.39 MA Screening Digital Bilateral   19. Screen for colon cancer Z12.11 Fecal colorectal  cancer screen FIT     GASTROENTEROLOGY ADULT REF PROCEDURE ONLY   20. Need for influenza vaccination Z23 FLU VAC, QUADRIVALENT (RIV4) RECOMBINANT DNA, IM     ADMIN 1st VACCINE   21. Need for pneumococcal vaccination Z23 PNEUMOCOCCAL VACCINE,ADULT,SQ OR IM     EA ADD'L VACCINE   22. Need for shingles vaccine Z23 ZOSTER VACCINE RECOMBINANT ADJUVANTED IM NJX     EA ADD'L VACCINE   23. Encounter for screening mammogram for malignant neoplasm of breast  Z12.31 MA Screening Digital Bilateral   24. Body mass index (bmi) 39.0-39.9, adult  Z68.39 Vitamin D Deficiency   25. Asymptomatic menopausal state  Z78.0 DX Hip/Pelvis/Spine       PLAN    Discussed treatment/modality options, including risk and benefits she desires:    1) Neurology follow up    2) Ophthalmology consult    3) HCM - DEXA, mammo, colonoscopy    4) fasting labs    5) immunizations - shingrix, flu blok, pneumovax 23    All diagnosis above reviewed and noted above, otherwise stable.  [facility] for further details.     Return in about 3 months (around 4/29/2020), or if symptoms worsen or fail to improve, for Medication Recheck Visit, Complete Physical, Follow Up Chronic.    Health Maintenance Due   Topic Date Due     DIABETIC FOOT EXAM  1965     EYE EXAM  1965     FIT  05/08/1973     PNEUMOCOCCAL IMMUNIZATION 19-64 HIGHEST RISK (2 of 3 - PCV13) 01/05/2012     MEDICARE ANNUAL WELLNESS VISIT  05/02/2017     DEXA  06/26/2017     HPV TEST  05/02/2019     PAP  05/02/2019     MAMMO SCREENING  07/09/2019     INFLUENZA VACCINE (1) 09/01/2019     PHQ-9  12/21/2019     ZOSTER IMMUNIZATION (2 of 2) 09/23/2019     A1C  02/03/2020       See Patient Instructions             Julius Hassan MD, FAAFP     [facility]  [address] [place] [id number]  [email or web address]   Office: [phone]  Fax: [phone]  Pager: [phone]

## 2020-01-29 NOTE — LETTER
[facility] [place]  01/29/20    Patient: Amanda Richardson  YOB: 1965  Medical Record Number: [id number]                                                                  Opioid / Opioid Plus Controlled Substance Agreement    I understand that my care provider has prescribed an opioid (narcotic) controlled substance to help manage my condition(s). I am taking this medicine to help me function or work. I know this is strong medicine, and that it can cause serious side effects. Opioid medicine can be sedating, addicting and may cause a dependency on the drug. They can affect my ability to drive or think, and cause depression. They need to be taken exactly as prescribed. Combining opioids with certain medicines or chemicals (such as cocaine, sedatives and tranquilizers, sleeping pills, meth) can be dangerous or even fatal. Also, if I stop opioids suddenly, I may have severe withdrawal symptoms. Last, I understand that opioids do not work for all types of pain nor for all patients. If not helpful, I may be asked to stop them.        The risks, benefits, and side effects of these medicine(s) were explained to me. I agree that:    1. I will take part in other treatments as advised by my care team. This may be psychiatry or counseling, physical therapy, behavioral therapy, group treatment or a referral to a pain clinic. I will reduce or stop my medicine when my care team tells me to do so.  2. I will take my medicines as prescribed. I will not change the dose or schedule unless my care team tells me to. There will be no refills if I  run out early.   I may be contactedwithout warning and asked to complete a urine drug test or pill count at any time.   3. I will keep all my appointments, and understand this is part of the monitoring of opioids. My care team may require an office visit for EVERY opioid/controlled substance refill. If I miss appointments or don t follow instructions, my care team may  stop my medicine.  4. I will not ask other providers to prescribe controlled substances, and I will not accept controlled substances from other people. If I need another prescribed controlled substance for a new reason, I will tell my care team within 1 business day.  5. I will use one pharmacy to fill all of my controlled substance prescriptions, and it is up to me to make sure that I do not run out of my medicines on weekends or holidays. If my care team is willing to refill my opioid prescription without a visit, I must request refills only during office hours, refills may take up to 3 days to process, and it may take up to 5 to 7 days for my medicine to be mailed and ready at my pharmacy. Prescriptions will not be mailed anywhere except my pharmacy.        [id number]  Rev 12/18         Registration to scan to EHR                             Page 1 of 2               Controlled Substance Agreement Opioid        [facility] [place]  01/29/20  Patient: Amanda Richardson  YOB: 1965  Medical Record Number: [id number]                                                                  6. I am responsible for my prescriptions. If the medicine/prescription is lost or stolen, it will not be replaced. I also agree not to share controlled substance medicines with anyone.  7. I agree to not use ANY illegal or recreational drugs. This includes marijuana, cocaine, bath salts or other drugs. I agree not to use alcohol unless my care team says I may.          I agree to give urine samples whenever asked. If I don t give a urine sample, the care team may stop my medicine.    8. If I enroll in the [place] Medical Marijuana program, I will tell my care team. I will also sign an agreement to share my medical records with my care team.   9. I will bring in my list of medicines (or my medicine bottles) each time I come to the clinic.   10. I will tell my care team right away if I become pregnant or have a new medical  problem treated outside of my regular clinic.  11. I understand that this medicine can affect my thinking and judgment. It may be unsafe for me to drive, use machinery and do dangerous tasks. I will not do any of these things until I know how the medicine affects me. If my dose changes, I will wait to see how it affects me. I will contact my care team if I have concerns about medicine side effects.    I understand that if I do not follow any of the conditions above, my prescriptions or treatment may be stopped.      I agree that my provider, clinic care team, and pharmacy may work with any city, state or federal law enforcement agency that investigates the misuse, sale, or other diversion of my controlled medicine. I will allow my provider to discuss my care with or share a copy of this agreement with any other treating provider, pharmacy or emergency room where I receive care. I agree to give up (waive) any right of privacy or confidentiality with respect to these consents.     I have read this agreement and have asked questions about anything I did not understand.      ________________________________________________________________________  Patient signature - Date/Time -  Amanda Richardson                                      ________________________________________________________________________  Witness signature                                                            ________________________________________________________________________  Provider signature - Julius Hassan MD      [id number]  Rev 12/18         Registration to scan to EHR                         Page 2 of 2                   Controlled Substance Agreement Opioid           Page 1 of 2  Opioid Pain Medicines (also known as Narcotics)  What You Need to Know    What are opioids?   Opioids are pain medicines that must be prescribed by a doctor.  They are also known as narcotics.    Examples are:     morphine (MS Contin, Lea)    oxycodone  (Oxycontin)    oxycodone and acetaminophen (Percocet)    hydrocodone and acetaminophen (Vicodin, Norco)     fentanyl patch (Duragesic)     hydromorphone (Dilaudid)     methadone     What do opioids do well?   Opioids are best for short-term pain after a surgery or injury. They also work well for cancer pain. Unlike other pain medicines, they do not cause liver or kidney failure or ulcers. They may help some people with long-lasting (chronic) pain.     What do opioids NOT do well?   Opioids never get rid of pain entirely, and they do not work well for most patients with chronic pain. Opioids do not reduce swelling, one of the causes of pain. They also don t work well for nerve pain.                           For informational purposes only.  Not to replace the advice of your care provider.  Copyright 201 [facility]. All right reserved. [employer] [id number]-[id number] 02/18.      Page 2 of 2    Risks and side effects   Talk to your doctor before you start or decide to keep taking one of these medicines. Side effects include:    Lowering your breathing rate enough to cause death    Overdose, including death, especially if taking higher than prescribed doses    Long-term opioid use    Worse depression symptoms; less pleasure in things you usually enjoy    Feeling tired or sluggish    Slower thoughts or cloudy thinking    Being more sensitive to pain over time; pain is harder to control    Trouble sleeping or restless sleep    Changes in hormone levels (for example, less testosterone)    Changes in sex drive or ability to have sex    Constipation    Unsafe driving    Itching and sweating    Feeling dizzy    Nausea, vomiting and dry mouth    What else should I know about opioids?  When someone takes opioids for too long or too often, they become dependent. This means that if you stop or reduce the medicine too quickly, you will have withdrawal symptoms.    Dependence is not the same as addiction. Addiction is when  people keep using a substance that harms their body, their mind or their relations with others. If you have a history of drug or alcohol abuse, taking opioids can cause a relapse.    Over time, opioids don t work as well. Most people will need higher and higher doses. The higher the dose, the more serious the side effects. We don t know the long-term effects of opioids.      Prescribed opioids aren't the best way to manage chronic pain    Other ways to manage pain include:      Ibuprofen or acetaminophen.  You should always try this first.      Treat health problems that may be causing pain.      acupuncture or massage, deep breathing, meditation, visual imagery, aromatherapy.      Use heat or ice at the pain site      Physical therapy and exercise      Stop smoking      See a counselor or therapist                                                  People who have used opioids for a long time may have a lower quality of life, worse depression, higher levels of pain and more visits to doctors.    Never share your opioids with others. Be sure to store opioids in a secure place, locked if possible.Young children can easily swallow them and overdose.     You can overdose on opioids.  Signs of overdose include decrease or loss of consciousness, slowed breathing, trouble waking and blue lips.  If someone is worried about overdose, they should call 911.    If you are at risk for overdose, you may get naloxone (Narcan, a medicine that reverses the effects of opioids.  If you overdose, a friend or family member can give you Narcan while waiting for the ambulance.  They need to know the signs of overdose and how to give Narcan.    While you're taking opioids:    Don't use alcohol or street drugs. Taking them together can cause death.    Don't take any of these medicines unless your doctor says its okay.  Taking these with opioids can cause death.    Benzodiazepines (such as lorazepam         or diazepam)    Muscle relaxers  (such as cyclobenzaprine)    sleeping pills    other opioids    Safe disposal of opioids  Find your area drug take-back program, your pharmacy mail-back program, buy a special disposal bag (such as Deterra) from your pharmacy or flush them down the toilet.  Use the guidelines at:  www.fda.gov/drugs/resourcesforyou

## 2020-01-29 NOTE — LETTER
[facility] [place]  01/29/20    Patient: Amanda Richardson  YOB: 1965  Medical Record Number: [id number]                                                                  Opioid / Opioid Plus Controlled Substance Agreement    I understand that my care provider has prescribed an opioid (narcotic) controlled substance to help manage my condition(s). I am taking this medicine to help me function or work. I know this is strong medicine, and that it can cause serious side effects. Opioid medicine can be sedating, addicting and may cause a dependency on the drug. They can affect my ability to drive or think, and cause depression. They need to be taken exactly as prescribed. Combining opioids with certain medicines or chemicals (such as cocaine, sedatives and tranquilizers, sleeping pills, meth) can be dangerous or even fatal. Also, if I stop opioids suddenly, I may have severe withdrawal symptoms. Last, I understand that opioids do not work for all types of pain nor for all patients. If not helpful, I may be asked to stop them.        The risks, benefits, and side effects of these medicine(s) were explained to me. I agree that:    1. I will take part in other treatments as advised by my care team. This may be psychiatry or counseling, physical therapy, behavioral therapy, group treatment or a referral to a pain clinic. I will reduce or stop my medicine when my care team tells me to do so.  2. I will take my medicines as prescribed. I will not change the dose or schedule unless my care team tells me to. There will be no refills if I  run out early.   I may be contactedwithout warning and asked to complete a urine drug test or pill count at any time.   3. I will keep all my appointments, and understand this is part of the monitoring of opioids. My care team may require an office visit for EVERY opioid/controlled substance refill. If I miss appointments or don t follow instructions, my care team may  stop my medicine.  4. I will not ask other providers to prescribe controlled substances, and I will not accept controlled substances from other people. If I need another prescribed controlled substance for a new reason, I will tell my care team within 1 business day.  5. I will use one pharmacy to fill all of my controlled substance prescriptions, and it is up to me to make sure that I do not run out of my medicines on weekends or holidays. If my care team is willing to refill my opioid prescription without a visit, I must request refills only during office hours, refills may take up to 3 days to process, and it may take up to 5 to 7 days for my medicine to be mailed and ready at my pharmacy. Prescriptions will not be mailed anywhere except my pharmacy.        [id number]  Rev 12/18         Registration to scan to EHR                             Page 1 of 2               Controlled Substance Agreement Opioid        [facility] [place]  01/29/20  Patient: Amanda Richardson  YOB: 1965  Medical Record Number: [id number]                                                                  6. I am responsible for my prescriptions. If the medicine/prescription is lost or stolen, it will not be replaced. I also agree not to share controlled substance medicines with anyone.  7. I agree to not use ANY illegal or recreational drugs. This includes marijuana, cocaine, bath salts or other drugs. I agree not to use alcohol unless my care team says I may.          I agree to give urine samples whenever asked. If I don t give a urine sample, the care team may stop my medicine.    8. If I enroll in the [place] Medical Marijuana program, I will tell my care team. I will also sign an agreement to share my medical records with my care team.   9. I will bring in my list of medicines (or my medicine bottles) each time I come to the clinic.   10. I will tell my care team right away if I become pregnant or have a new medical  problem treated outside of my regular clinic.  11. I understand that this medicine can affect my thinking and judgment. It may be unsafe for me to drive, use machinery and do dangerous tasks. I will not do any of these things until I know how the medicine affects me. If my dose changes, I will wait to see how it affects me. I will contact my care team if I have concerns about medicine side effects.    I understand that if I do not follow any of the conditions above, my prescriptions or treatment may be stopped.      I agree that my provider, clinic care team, and pharmacy may work with any city, state or federal law enforcement agency that investigates the misuse, sale, or other diversion of my controlled medicine. I will allow my provider to discuss my care with or share a copy of this agreement with any other treating provider, pharmacy or emergency room where I receive care. I agree to give up (waive) any right of privacy or confidentiality with respect to these consents.     I have read this agreement and have asked questions about anything I did not understand.      ________________________________________________________________________  Patient signature - Date/Time -  Amanda Richardson                                      ________________________________________________________________________  Witness signature                                                            ________________________________________________________________________  Provider signature - Julius Hassan MD      [id number]  Rev 12/18         Registration to scan to EHR                         Page 2 of 2                   Controlled Substance Agreement Opioid           Page 1 of 2  Opioid Pain Medicines (also known as Narcotics)  What You Need to Know    What are opioids?   Opioids are pain medicines that must be prescribed by a doctor.  They are also known as narcotics.    Examples are:     morphine (MS Contin, Lea)    oxycodone  (Oxycontin)    oxycodone and acetaminophen (Percocet)    hydrocodone and acetaminophen (Vicodin, Norco)     fentanyl patch (Duragesic)     hydromorphone (Dilaudid)     methadone     What do opioids do well?   Opioids are best for short-term pain after a surgery or injury. They also work well for cancer pain. Unlike other pain medicines, they do not cause liver or kidney failure or ulcers. They may help some people with long-lasting (chronic) pain.     What do opioids NOT do well?   Opioids never get rid of pain entirely, and they do not work well for most patients with chronic pain. Opioids do not reduce swelling, one of the causes of pain. They also don t work well for nerve pain.                           For informational purposes only.  Not to replace the advice of your care provider.  Copyright [address] [facility]. All right reserved. [employer] 528192-Adf 02/18.      Page 2 of 2    Risks and side effects   Talk to your doctor before you start or decide to keep taking one of these medicines. Side effects include:    Lowering your breathing rate enough to cause death    Overdose, including death, especially if taking higher than prescribed doses    Long-term opioid use    Worse depression symptoms; less pleasure in things you usually enjoy    Feeling tired or sluggish    Slower thoughts or cloudy thinking    Being more sensitive to pain over time; pain is harder to control    Trouble sleeping or restless sleep    Changes in hormone levels (for example, less testosterone)    Changes in sex drive or ability to have sex    Constipation    Unsafe driving    Itching and sweating    Feeling dizzy    Nausea, vomiting and dry mouth    What else should I know about opioids?  When someone takes opioids for too long or too often, they become dependent. This means that if you stop or reduce the medicine too quickly, you will have withdrawal symptoms.    Dependence is not the same as addiction. Addiction is when  people keep using a substance that harms their body, their mind or their relations with others. If you have a history of drug or alcohol abuse, taking opioids can cause a relapse.    Over time, opioids don t work as well. Most people will need higher and higher doses. The higher the dose, the more serious the side effects. We don t know the long-term effects of opioids.      Prescribed opioids aren't the best way to manage chronic pain    Other ways to manage pain include:      Ibuprofen or acetaminophen.  You should always try this first.      Treat health problems that may be causing pain.      acupuncture or massage, deep breathing, meditation, visual imagery, aromatherapy.      Use heat or ice at the pain site      Physical therapy and exercise      Stop smoking      See a counselor or therapist                                                  People who have used opioids for a long time may have a lower quality of life, worse depression, higher levels of pain and more visits to doctors.    Never share your opioids with others. Be sure to store opioids in a secure place, locked if possible.Young children can easily swallow them and overdose.     You can overdose on opioids.  Signs of overdose include decrease or loss of consciousness, slowed breathing, trouble waking and blue lips.  If someone is worried about overdose, they should call 911.    If you are at risk for overdose, you may get naloxone (Narcan, a medicine that reverses the effects of opioids.  If you overdose, a friend or family member can give you Narcan while waiting for the ambulance.  They need to know the signs of overdose and how to give Narcan.    While you're taking opioids:    Don't use alcohol or street drugs. Taking them together can cause death.    Don't take any of these medicines unless your doctor says its okay.  Taking these with opioids can cause death.    Benzodiazepines (such as lorazepam         or diazepam)    Muscle relaxers  (such as cyclobenzaprine)    sleeping pills    other opioids    Safe disposal of opioids  Find your area drug take-back program, your pharmacy mail-back program, buy a special disposal bag (such as Deterra) from your pharmacy or flush them down the toilet.  Use the guidelines at:  www.fda.gov/drugs/resourcesforyou

## 2020-01-30 ASSESSMENT — ANXIETY QUESTIONNAIRES: GAD7 TOTAL SCORE: 1

## 2020-02-24 ENCOUNTER — MYC REFILL (OUTPATIENT)
Dept: FAMILY MEDICINE | Facility: CLINIC | Age: 55
End: 2020-02-24

## 2020-02-24 DIAGNOSIS — G35 MS (MULTIPLE SCLEROSIS) (H): ICD-10-CM

## 2020-02-24 DIAGNOSIS — G89.4 CHRONIC PAIN SYNDROME: ICD-10-CM

## 2020-02-24 NOTE — TELEPHONE ENCOUNTER
Controlled Substance Refill Request for oxyCODONE IR (ROXICODONE) 15 MG tablet  Problem List Complete:    Yes    Last Written Prescription Date:  1.29.20  Last Fill Quantity: 120 tablet,   # refills: 0      Last Office Visit with [facility] [place] care provider: 1.29.20    Future Office visit:   Next 5 appointments (look out 90 days)    Apr 22, 2020  3:50 PM CDT  PHYSICAL with Julius Hassan MD  [facility] [address] [place] [id number] [phone]          Controlled substance agreement:   Encounter-Level CSA - 02/12/2018:    Controlled Substance Agreement - Scan on 2/28/2018 10:12 AM: CONTROLLED SUBSTANCE AGREEMENT     Encounter-Level CSA - 06/09/2016:    Controlled Substance Agreement - Scan on 6/17/2016 10:49 AM: CONTROLLED SUBSTANCE AGREEMENT     Encounter-Level CSA - 04/03/2015:    Controlled Substance Agreement - Scan on 4/12/2015 10:28 AM: Controlled Medication Agreement 04/03/15     Patient-Level CSA:    There are no patient-level csa.         Last Urine Drug Screen:   Pain Drug SCR UR W RPTD Meds   Date Value Ref Range Status   11/05/2019 FINAL  Final     Comment:     (Note)  ====================================================================  [facility] COMP DRUG ANALYSIS,UR  ====================================================================  Test                             Result       Flag       Units        Drug Present   Oxycodone                      2463                    ng/mg creat   Oxymorphone                    4415                    ng/mg creat   Noroxycodone                   4391                    ng/mg creat   Noroxymorphone                 1432                    ng/mg creat    Sources of oxycodone are scheduled prescription medications.    Oxymorphone, noroxycodone, and noroxymorphone are expected    metabolites of oxycodone. Oxymorphone is also available as a    scheduled prescription medication.   Gabapentin                      PRESENT                               Duloxetine                     PRESENT                               Salicylate                     PRESENT                               Metoprolol                     PRESENT                              ====================================================================  Test                      Result    Flag   Units      Ref Range        Creatinine              97               mg/dL      >=20            ====================================================================  Declared Medications:  Medication list was not provided.  ====================================================================  For clinical consultation, please call [phone].  ====================================================================  Analysis performed by [facility] [employer] [place] [address]     , No results found for: COMDAT, No results found for: THC13, PCP13, COC13, MAMP13, OPI13, AMP13, BZO13, TCA13, MTD13, BAR13, OXY13, PPX13, BUP13     Processing:  Fax Rx to listed pharmacy    [email or web address]   checked in past 3 months?  No, route to RN

## 2020-02-25 RX ORDER — OXYCODONE HYDROCHLORIDE 15 MG/1
15 TABLET ORAL EVERY 6 HOURS PRN
Qty: 120 TABLET | Refills: 0 | Status: SHIPPED | OUTPATIENT
Start: 2020-02-28 | End: 2020-03-26

## 2020-03-02 ENCOUNTER — HEALTH MAINTENANCE LETTER (OUTPATIENT)
Age: 55
End: 2020-03-02

## 2020-03-26 ENCOUNTER — MYC REFILL (OUTPATIENT)
Dept: FAMILY MEDICINE | Facility: CLINIC | Age: 55
End: 2020-03-26

## 2020-03-26 DIAGNOSIS — G89.4 CHRONIC PAIN SYNDROME: ICD-10-CM

## 2020-03-26 DIAGNOSIS — G35 MS (MULTIPLE SCLEROSIS) (H): ICD-10-CM

## 2020-03-26 RX ORDER — OXYCODONE HYDROCHLORIDE 15 MG/1
15 TABLET ORAL EVERY 6 HOURS PRN
Qty: 120 TABLET | Refills: 0 | Status: SHIPPED | OUTPATIENT
Start: 2020-03-27 | End: 2020-04-23

## 2020-03-26 NOTE — TELEPHONE ENCOUNTER
Outpatient Medication Detail      Disp  Refills  Start  End  AISHA    oxyCODONE IR (ROXICODONE) 15 MG tablet  120 tablet  0  2/28/2020   No    Sig - Route: Take 1 tablet (15 mg) by mouth every 6 hours as needed for moderate to severe pain Limit 4 tablet(s) per day. - Oral      Problem List Complete:    Yes    Last Office Visit with [facility] primary care provider: 01/29/2020    Future Office visit:   Next 5 appointments (look out 90 days)    Apr 22, 2020  3:50 PM CDT  PHYSICAL with Julius Hassan MD  [facility] [address] [place] [id number] [phone]        Controlled substance agreement:   Encounter-Level CSA - 02/12/2018:    Controlled Substance Agreement - Scan on 2/28/2018 10:12 AM: CONTROLLED SUBSTANCE AGREEMENT     Encounter-Level CSA - 06/09/2016:    Controlled Substance Agreement - Scan on 6/17/2016 10:49 AM: CONTROLLED SUBSTANCE AGREEMENT     Encounter-Level CSA - 04/03/2015:    Controlled Substance Agreement - Scan on 4/12/2015 10:28 AM: Controlled Medication Agreement 04/03/15     Patient-Level CSA:    There are no patient-level csa.       Last Urine Drug Screen:   Pain Drug SCR UR W RPTD Meds   Date Value Ref Range Status   11/05/2019 FINAL  Final     Comment:     (Note)  ====================================================================  TOXASSURE COMP DRUG ANALYSIS,UR  ====================================================================  Test                             Result       Flag       Units        Drug Present   Oxycodone                      2463                    ng/mg creat   Oxymorphone                    4415                    ng/mg creat   Noroxycodone                   4391                    ng/mg creat   Noroxymorphone                 1432                    ng/mg creat    Sources of oxycodone are scheduled prescription medications.    Oxymorphone, noroxycodone, and noroxymorphone are expected    metabolites of  oxycodone. Oxymorphone is also available as a    scheduled prescription medication.   Gabapentin                     PRESENT                               Duloxetine                     PRESENT                               Salicylate                     PRESENT                               Metoprolol                     PRESENT                              ====================================================================  Test                      Result    Flag   Units      Ref Range        Creatinine              97               mg/dL      >=20            ====================================================================  Declared Medications:  Medication list was not provided.  ====================================================================  For clinical consultation, please call [phone].  ====================================================================  Analysis performed by [employer] [place] [address]     , No results found for: COMDAT, No results found for: THC13, PCP13, COC13, MAMP13, OPI13, AMP13, BZO13, TCA13, MTD13, BAR13, OXY13, PPX13, BUP13    [email or web address]    Routing refill request to provider for review/approval because:  Drug not on the [facility] refill protocol         Ivanna Kelly RN  [facility]

## 2020-05-04 ENCOUNTER — VIRTUAL VISIT (OUTPATIENT)
Dept: FAMILY MEDICINE | Facility: CLINIC | Age: 55
End: 2020-05-04
Payer: COMMERCIAL

## 2020-05-04 DIAGNOSIS — M54.16 LUMBAR RADICULOPATHY: ICD-10-CM

## 2020-05-04 DIAGNOSIS — N18.1 CKD (CHRONIC KIDNEY DISEASE) STAGE 1, GFR 90 ML/MIN OR GREATER: ICD-10-CM

## 2020-05-04 DIAGNOSIS — N18.1 TYPE 2 DIABETES MELLITUS WITH STAGE 1 CHRONIC KIDNEY DISEASE, WITHOUT LONG-TERM CURRENT USE OF INSULIN (H): ICD-10-CM

## 2020-05-04 DIAGNOSIS — M48.061 SPINAL STENOSIS OF LUMBAR REGION, UNSPECIFIED WHETHER NEUROGENIC CLAUDICATION PRESENT: ICD-10-CM

## 2020-05-04 DIAGNOSIS — C85.90 T-CELL LYMPHOMA (H): ICD-10-CM

## 2020-05-04 DIAGNOSIS — F32.A MODERATE DEPRESSIVE EPISODE: ICD-10-CM

## 2020-05-04 DIAGNOSIS — G89.4 CHRONIC PAIN SYNDROME: ICD-10-CM

## 2020-05-04 DIAGNOSIS — Z51.81 MEDICATION MONITORING ENCOUNTER: ICD-10-CM

## 2020-05-04 DIAGNOSIS — I10 HYPERTENSION GOAL BP (BLOOD PRESSURE) < 140/90: ICD-10-CM

## 2020-05-04 DIAGNOSIS — E11.9 TYPE 2 DIABETES, HBA1C GOAL < 7% (H): ICD-10-CM

## 2020-05-04 DIAGNOSIS — E11.22 TYPE 2 DIABETES MELLITUS WITH STAGE 1 CHRONIC KIDNEY DISEASE, WITHOUT LONG-TERM CURRENT USE OF INSULIN (H): ICD-10-CM

## 2020-05-04 DIAGNOSIS — G35 MS (MULTIPLE SCLEROSIS) (H): Primary | ICD-10-CM

## 2020-05-04 DIAGNOSIS — E78.5 HYPERLIPIDEMIA LDL GOAL <70: ICD-10-CM

## 2020-05-04 DIAGNOSIS — Z87.81 S/P RIGHT HIP FRACTURE: ICD-10-CM

## 2020-05-04 PROCEDURE — 99214 OFFICE O/P EST MOD 30 MIN: CPT | Mod: 95 | Performed by: FAMILY MEDICINE

## 2020-05-04 RX ORDER — METOPROLOL SUCCINATE 50 MG/1
50 TABLET, EXTENDED RELEASE ORAL DAILY
Qty: 90 TABLET | Refills: 1 | Status: SHIPPED | OUTPATIENT
Start: 2020-05-04 | End: 2021-01-06

## 2020-05-04 NOTE — PROGRESS NOTES
[facility]    Telephone visit  - [phone]    Subjective    Amanda Richardson is a 54 year old female who is being evaluated via a billable telephone visit.      Chief Complaint   Patient presents with     Recheck Medication     Overall, when going well    MS doing ok/stable    T cell lymphoma (NHL) all ok    Chronic pain - spinal stenosis - lumbar radiculopathy - stable    DM 2 -     Stable    Lab Results   Component Value Date    A1C 6.4 08/03/2019    A1C 6.3 03/17/2019    A1C 6.6 02/09/2019    A1C 6.4 01/30/2019    A1C 6.8 06/20/2018     CKD    Creatinine   Date Value Ref Range Status   08/03/2019 0.47 (L) 0.52 - 1.04 mg/dL Final     Doing well    Htn    BP Readings from Last 3 Encounters:   01/29/20 118/86   01/15/20 (!) 144/79   12/18/19 (!) 143/66     Lipids    Recent Labs   Lab Test 08/03/19  0930 03/18/19  0625  06/20/18  1529  12/08/14  1756 11/13/12  0957   CHOL 124  --   --  141   < > 172 205*   HDL 36*  --   --  33*   < > 36* 32*   LDL 71  --   --  82   < > 97 140*   TRIG 84 218*   < > 130   < > 197* 167*   CHOLHDLRATIO  --   --   --   --   --  4.8 6.5*    < > = values in this interval not displayed.     Depression/Anxiety    No issues    PMH    Past Medical History:   Diagnosis Date     Abnormality of gait 7/27/2012     Arrhythmia     with sepsis     Chronic pain      Pain Clinic - yearly, next in summer 2018     CKD (chronic kidney disease) stage 1, GFR 90 ml/min or greater     kidney stones     Colon polyps 1/15    tubular adenomas x 2     Depressive disorder      Gallstone 6/11/2012     Hyperlipidemia LDL goal <70      Hypertension goal BP (blood pressure) < 140/90      Leukocytosis 6/11/2012     Moderate depressive episode (H)      MS (multiple sclerosis) (H) 2003    Dr Vigil/Gaby - [facility]     Multiple sclerosis (H)      Non Hodgkin's lymphoma (H) 06/11/2012    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 Cystoscopy 2. LEFT retrograde pyelogram 3. LEFT JJ stent placement 4. <1hr physician fluoroscopy time;  Surgeon: Epifanio Sapp MD;  Location: [facility] OR     COMBINED CYSTOSCOPY, RETROGRADES, URETEROSCOPY, LASER HOLMIUM LITHOTRIPSY URETER(S), INSERT STENT Left 3/15/2019    Procedure: Cystoscopy, left ureteral stent exchange, left retrograde pyelogram, interpretation of fluoroscopic images, left ureteroscopy with holmium lithotripsy and stone basketing, 22 modifier for difficult lengthy case.;  Surgeon: Mayito Chauhan MD;  Location: [facility] OR     CYSTOSCOPY       CYSTOSCOPY, REMOVE STENT(S), COMBINED Left 3/27/2019    Procedure: Flexible cystoscopy with left ureteral stent removal;  Surgeon: Mayito Chauhan MD;  Location: [facility] OR     FUSION LUMBAR ANTERIOR, FUSION LUMBAR POSTERIOR TWO LEVELS, COMBINED  10/17/2013    lumbar fusion - Dr Floyd     INSERT PUMP BACLOFEN  04/2017    intrathecal baclofen pump implantation     IRRIGATION AND DEBRIDEMENT LOWER EXTREMITY, COMBINED Right 2015    Right ankle I&D  d/t infection     OPEN REDUCTION INTERNAL FIXATION ANKLE  5/15    Right Bimalleolar ankle fx ORIF     OPEN REDUCTION INTERNAL FIXATION ANKLE Right 11/2015    Revision due to spasms pulling screws out of ankle     SINUS SURGERY  2011    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     XR LUMBAR EPIDURAL INJECTION INCL IMAGING  3/14    Left L4-5 Epidural Dr Winter       Medications    Current Outpatient Medications   Medication Sig Dispense Refill     acetaminophen (TYLENOL) 325 MG tablet Take 2 tablets (650 mg) by mouth every 4 hours as needed for mild pain       albuterol (PROVENTIL) (2.5 MG/3ML) 0.083% neb solution Take 1 vial (2.5 mg) by nebulization once for 1 dose 25 vial 1     amitriptyline (ELAVIL) 100 MG tablet Take 1 tablet (100 mg) by mouth At Bedtime 90 tablet 3     aspirin (ASA) 81 MG chewable tablet Take 1 tablet (81 mg) by mouth daily       atorvastatin (LIPITOR) 10 MG tablet Take 1 tablet (10 mg) by mouth daily 90 tablet 3     baclofen (LIORESAL) 10 MG tablet Take 10 mg by mouth 3 times daily       blood glucose (ACCU-CHEK KEL) test strip Use to test blood sugar 1 times daily or as directed. 100 strip 5     blood glucose (NO BRAND SPECIFIED) lancets standard Use to test blood sugar 1 times daily or as directed. 100 each 5     blood glucose calibration (NO BRAND SPECIFIED) solution Use to calibrate blood glucose monitor as needed as directed. 1 each 0     blood glucose monitoring (ACCU-CHEK KEL PLUS) meter device kit Use to test blood sugar 1 times daily or as directed. 1 kit 0     blood glucose monitoring (ACCU-CHEK MULTICLIX) lancets Use to test blood sugar 1 times daily or as directed. 100 each 5     DULoxetine (CYMBALTA) 60 MG capsule Take 1 capsule (60 mg) by mouth 2 times daily 180 capsule 3     gabapentin (NEURONTIN) 300 MG capsule Take 600 mg by mouth 3 times daily       losartan-hydrochlorothiazide (HYZAAR) 50-12.5 MG tablet Take 1 tablet by mouth daily 90 tablet 3     metFORMIN  (GLUCOPHAGE) 500 MG tablet TAKE 1 TABLET(500 MG) BY MOUTH DAILY WITH DINNER 90 tablet 3     metoprolol succinate ER (TOPROL-XL) 50 MG 24 hr tablet Take 1 tablet (50 mg) by mouth daily 90 tablet 1     Multiple Vitamin (MULTI-VITAMIN PO) Take 1 tablet by mouth daily        naloxone (NARCAN) 4 MG/0.1ML nasal spray Spray 1 spray (4 mg) into one nostril alternating nostrils once as needed for opioid reversal Every 2-3 minutes until patient responsive or EMS arrives 0.2 mL 0     omega-3 fatty acids (FISH OIL) 1200 MG capsule Take 1 capsule by mouth daily.       oxyCODONE IR (ROXICODONE) 15 MG tablet Take 1 tablet (15 mg) by mouth every 6 hours as needed for moderate to severe pain Limit 4 tablet(s) per day. 120 tablet 0     senna-docusate (SENOKOT-S/PERICOLACE) 8.6-50 MG tablet Take 2 tablets by mouth daily as needed for constipation 180 tablet 0     vitamin D3 (VITAMIN D3) 1000 units (25 mcg) tablet Take 1 tablet (1,000 Units) by mouth daily         Allergies    Lisinopril; Cyclobenzaprine; and Flexeril [cyclobenzaprine hcl]    Family History    Family History   Problem Relation Age of Onset     C.A.D. Father         with CHF      Cancer Father         ? unsure type - abdominal      Hypertension Mother      Thyroid Disease Mother         goiter      Breast Cancer Sister 58     Thyroid Disease Son      Cancer - colorectal No family hx of        Social History    Social History     Socioeconomic History     Marital status:      Spouse name: Fernando     Number of children: 3     Years of education: 14     Highest education level: Not on file   Occupational History     Employer: UNEMPLOYED   Social Needs     Financial resource strain: Not on file     Food insecurity     Worry: Not on file     Inability: Not on file     Transportation needs     Medical: Not on file     Non-medical: Not on file   Tobacco Use     Smoking status: Former Smoker     Packs/day: 0.50     Years: 0.00     Pack years: 0.00     Types: Cigarettes      Last attempt to quit: 2013     Years since quittin.7     Smokeless tobacco: Never Used     Tobacco comment: since age 17   Substance and Sexual Activity     Alcohol use: No     Drug use: No     Sexual activity: Yes     Partners: Male   Lifestyle     Physical activity     Days per week: Not on file     Minutes per session: Not on file     Stress: Not on file   Relationships     Social connections     Talks on phone: Not on file     Gets together: Not on file     Attends Jew service: Not on file     Active member of club or organization: Not on file     Attends meetings of clubs or organizations: Not on file     Relationship status: Not on file     Intimate partner violence     Fear of current or ex partner: Not on file     Emotionally abused: Not on file     Physically abused: Not on file     Forced sexual activity: Not on file   Other Topics Concern     Parent/sibling w/ CABG, MI or angioplasty before 65F 55M? No      Service Not Asked     Blood Transfusions Not Asked     Caffeine Concern Yes     Comment: occas     Occupational Exposure Not Asked     Hobby Hazards Not Asked     Sleep Concern Not Asked     Stress Concern Not Asked     Weight Concern Not Asked     Special Diet Not Asked     Back Care Not Asked     Exercise Yes     Comment: as able     Bike Helmet Not Asked     Seat Belt Yes     Self-Exams Not Asked   Social History Narrative     Not on file       Reviewed and updated as needed this visit by Provider           Review of Systems     ROS COMP: CONSTITUTIONAL: NEGATIVE for fever, chills, change in weight  INTEGUMENTARY/SKIN: NEGATIVE for worrisome rashes, moles or lesions  EYES: NEGATIVE for vision changes or irritation  ENT/MOUTH: NEGATIVE for ear, mouth and throat problems  RESP: NEGATIVE for significant cough or SOB  CV: NEGATIVE for chest pain, palpitations or peripheral edema  GI: NEGATIVE for nausea, abdominal pain, heartburn, or change in bowel habits  : NEGATIVE for  "frequency, dysuria, or hematuria  MUSCULOSKELETAL: NEGATIVE for significant arthralgias or myalgia  NEURO: NEGATIVE for weakness, dizziness or paresthesias  ENDOCRINE: NEGATIVE for temperature intolerance, skin/hair changes  HEME: NEGATIVE for bleeding problems  PSYCHIATRIC: NEGATIVE for changes in mood or affect    Objective    Reported vitals:  [facility] 12/11/2011      healthy, alert and no distress  Psych: Alert and oriented times 3; coherent speech, normal   rate and volume, able to articulate logical thoughts, able   to abstract reason, no tangential thoughts, no hallucinations   or delusions  Her affect is normal     Diagnostic Test Results:  none     Assessment/Plan:      ICD-10-CM    1. MS (multiple sclerosis) (H)  G35    2. T-cell lymphoma (H)  C85.90    3. Chronic pain syndrome  G89.4    4. Spinal stenosis of lumbar region, unspecified whether neurogenic claudication present  M48.061    5. Lumbar radiculopathy  M54.16    6. Type 2 diabetes mellitus with stage 1 chronic kidney disease, without long-term current use of insulin (H)  E11.22     N18.1    7. Type 2 diabetes, HbA1c goal < 7% (H)  E11.9    8. CKD (chronic kidney disease) stage 1, GFR 90 ml/min or greater  N18.1    9. Hypertension goal BP (blood pressure) < 140/90  I10 metoprolol succinate ER (TOPROL-XL) 50 MG 24 hr tablet   10. Hyperlipidemia LDL goal <70  E78.5    11. Moderate depressive episode (H)  F32.1    12. S/P right hip fracture  Z87.81    13. Medication monitoring encounter  Z51.81        Labs, doing well.  Meds, doing well.    Return in about 3 months (around 8/4/2020), or if symptoms worsen or fail to improve, for Medication Recheck Visit, Follow Up Chronic.    Phone call duration:  14 minutes    The patient has been notified of following:     \"This telephone visit will be conducted via a call between you and your physician/provider. We have found that certain health care needs can be provided without the need for a physical exam.  This " "service lets us provide the care you need with a short phone conversation.  If a prescription is necessary we can send it directly to your pharmacy.  If lab work is needed we can place an order for that and you can then stop by our lab to have the test done at a later time.    Telephone visits are billed at different rates depending on your insurance coverage. During this emergency period, for some insurers they may be billed the same as an in-person visit.  Please reach out to your insurance provider with any questions.    If during the course of the call the physician/provider feels a telephone visit is not appropriate, you will not be charged for this service.\"    Patient has given verbal consent for Telephone visit?  Yes    How would you like to obtain your AVS? Talita Hassan MD, [facility]  [address] [place] [id number]  [email or web address]   Office: [phone]  Fax: [phone]  Pager: [phone]     "

## 2020-06-23 NOTE — ED NOTES
Bed: ED33  Expected date: 1/30/19  Expected time: 6:51 PM  Means of arrival:   Comments:  Room 27   Foreign Body Removal

## 2020-08-17 ENCOUNTER — MYC REFILL (OUTPATIENT)
Dept: FAMILY MEDICINE | Facility: CLINIC | Age: 55
End: 2020-08-17

## 2020-08-17 DIAGNOSIS — G35 MS (MULTIPLE SCLEROSIS) (H): ICD-10-CM

## 2020-08-17 DIAGNOSIS — G89.4 CHRONIC PAIN SYNDROME: ICD-10-CM

## 2020-08-17 NOTE — TELEPHONE ENCOUNTER
Controlled Substance Refill Request for oxyCODONE IR (ROXICODONE) 15 MG tablet  Problem List Complete:    Yes    Last Written Prescription Date:  7-22-20  Last Fill Quantity: 120 tablet,   # refills: 0    THE MOST RECENT OFFICE VISIT MUST BE WITHIN THE PAST 3 MONTHS. AT LEAST ONE FACE TO FACE VISIT MUST OCCUR EVERY 6 MONTHS. ADDITIONAL VISITS CAN BE VIRTUAL.  (THIS STATEMENT SHOULD BE DELETED.)    Last Office Visit with [facility] primary care provider: 5-4-20    Future Office visit:     Controlled substance agreement:   Encounter-Level CSA - 02/12/2018:    Controlled Substance Agreement - Scan on 2/28/2018 10:12 AM: CONTROLLED SUBSTANCE AGREEMENT     Encounter-Level CSA - 06/09/2016:    Controlled Substance Agreement - Scan on 6/17/2016 10:49 AM: CONTROLLED SUBSTANCE AGREEMENT     Encounter-Level CSA - 04/03/2015:    Controlled Substance Agreement - Scan on 4/12/2015 10:28 AM: Controlled Medication Agreement 04/03/15     Patient-Level CSA:    There are no patient-level csa.         Last Urine Drug Screen:   Pain Drug SCR UR W RPTD Meds   Date Value Ref Range Status   11/05/2019 FINAL  Final     Comment:     (Note)  ====================================================================  TOXASSURE COMP DRUG ANALYSIS,UR  ====================================================================  Test                             Result       Flag       Units        Drug Present   Oxycodone                      2463                    ng/mg creat   Oxymorphone                    4415                    ng/mg creat   Noroxycodone                   4391                    ng/mg creat   Noroxymorphone                 1432                    ng/mg creat    Sources of oxycodone are scheduled prescription medications.    Oxymorphone, noroxycodone, and noroxymorphone are expected    metabolites of oxycodone. Oxymorphone is also available as a    scheduled prescription medication.   Gabapentin                     PRESENT                                Duloxetine                     PRESENT                               Salicylate                     PRESENT                               Metoprolol                     PRESENT                              ====================================================================  Test                      Result    Flag   Units      Ref Range        Creatinine              97               mg/dL      >=20            ====================================================================  Declared Medications:  Medication list was not provided.  ====================================================================  For clinical consultation, please call [phone].  ====================================================================  Analysis performed by [employer] [place] [address]     , No results found for: COMDAT, No results found for: THC13, PCP13, COC13, MAMP13, OPI13, AMP13, BZO13, TCA13, MTD13, BAR13, OXY13, PPX13, BUP13     Processing:  Fax Rx to listed pharmacy    [email or web address]   checked in past 3 months?  No, route to RN

## 2020-08-18 RX ORDER — OXYCODONE HYDROCHLORIDE 15 MG/1
15 TABLET ORAL EVERY 6 HOURS PRN
Qty: 120 TABLET | Refills: 0 | Status: SHIPPED | OUTPATIENT
Start: 2020-08-22 | End: 2020-08-19

## 2020-08-19 RX ORDER — OXYCODONE HYDROCHLORIDE 15 MG/1
15 TABLET ORAL EVERY 6 HOURS PRN
Qty: 120 TABLET | Refills: 0 | Status: SHIPPED | OUTPATIENT
Start: 2020-08-21 | End: 2020-09-16

## 2020-08-19 NOTE — TELEPHONE ENCOUNTER
Last written Rx was 7/22/20   Pulled - Oxycodone filled on 7/22/20 for #120 tabs (30 day supply)    Called patient @ [phone] -     Stated the start date is wrong - there was 31 days in July so she should be 8/21.   Patient requesting new prescription be sent    Patient requesting a call back once new Rx has been sent    Routing to PCP for further review/recommendations/orders.  RN reviewed - start date should be 8/21. New Rx pended    Ivanna Kelly RN  [facility]

## 2020-08-19 NOTE — TELEPHONE ENCOUNTER
Called patient @ # below -     Advised of PCP message - Patient stated an understanding and agreed with plan.    Ivanna Kelly RN  [facility]

## 2020-08-19 NOTE — TELEPHONE ENCOUNTER
Patient calling with a message for Dr Hassan.  It is in regards to the fill date of 8/22 please call her home number and discuss with her please.

## 2020-08-19 NOTE — TELEPHONE ENCOUNTER
We've had this discussion before, should have extras from February, will do 8/21 refill date, however in february 2021 will wait 2 extra days before refills, please check

## 2020-08-21 ENCOUNTER — TELEPHONE (OUTPATIENT)
Dept: FAMILY MEDICINE | Facility: CLINIC | Age: 55
End: 2020-08-21

## 2020-08-21 ENCOUNTER — VIRTUAL VISIT (OUTPATIENT)
Dept: FAMILY MEDICINE | Facility: CLINIC | Age: 55
End: 2020-08-21
Payer: COMMERCIAL

## 2020-08-21 ENCOUNTER — TELEPHONE (OUTPATIENT)
Dept: PALLIATIVE MEDICINE | Facility: CLINIC | Age: 55
End: 2020-08-21

## 2020-08-21 DIAGNOSIS — E66.01 MORBID OBESITY (H): ICD-10-CM

## 2020-08-21 DIAGNOSIS — I89.0 LYMPHEDEMA: ICD-10-CM

## 2020-08-21 DIAGNOSIS — G35 MS (MULTIPLE SCLEROSIS) (H): Primary | ICD-10-CM

## 2020-08-21 DIAGNOSIS — Z87.81 S/P RIGHT HIP FRACTURE: ICD-10-CM

## 2020-08-21 DIAGNOSIS — C85.90 T-CELL LYMPHOMA (H): ICD-10-CM

## 2020-08-21 DIAGNOSIS — E78.5 HYPERLIPIDEMIA LDL GOAL <70: ICD-10-CM

## 2020-08-21 DIAGNOSIS — C85.81 OTHER SPECIFIED TYPE OF NON-HODGKIN LYMPHOMA OF HEAD (H): ICD-10-CM

## 2020-08-21 DIAGNOSIS — E11.22 TYPE 2 DIABETES MELLITUS WITH STAGE 1 CHRONIC KIDNEY DISEASE, WITHOUT LONG-TERM CURRENT USE OF INSULIN (H): ICD-10-CM

## 2020-08-21 DIAGNOSIS — N18.1 TYPE 2 DIABETES MELLITUS WITH STAGE 1 CHRONIC KIDNEY DISEASE, WITHOUT LONG-TERM CURRENT USE OF INSULIN (H): ICD-10-CM

## 2020-08-21 DIAGNOSIS — M54.16 LUMBAR RADICULOPATHY: ICD-10-CM

## 2020-08-21 DIAGNOSIS — F32.A MODERATE DEPRESSIVE EPISODE: ICD-10-CM

## 2020-08-21 DIAGNOSIS — I10 HYPERTENSION GOAL BP (BLOOD PRESSURE) < 140/90: ICD-10-CM

## 2020-08-21 DIAGNOSIS — E11.9 TYPE 2 DIABETES, HBA1C GOAL < 7% (H): ICD-10-CM

## 2020-08-21 DIAGNOSIS — N18.1 CKD (CHRONIC KIDNEY DISEASE) STAGE 1, GFR 90 ML/MIN OR GREATER: ICD-10-CM

## 2020-08-21 DIAGNOSIS — G89.4 CHRONIC PAIN SYNDROME: ICD-10-CM

## 2020-08-21 DIAGNOSIS — Z51.81 MEDICATION MONITORING ENCOUNTER: ICD-10-CM

## 2020-08-21 DIAGNOSIS — M48.061 SPINAL STENOSIS OF LUMBAR REGION, UNSPECIFIED WHETHER NEUROGENIC CLAUDICATION PRESENT: ICD-10-CM

## 2020-08-21 PROCEDURE — 99214 OFFICE O/P EST MOD 30 MIN: CPT | Mod: 95 | Performed by: FAMILY MEDICINE

## 2020-08-21 ASSESSMENT — ANXIETY QUESTIONNAIRES
3. WORRYING TOO MUCH ABOUT DIFFERENT THINGS: NOT AT ALL
5. BEING SO RESTLESS THAT IT IS HARD TO SIT STILL: NOT AT ALL
7. FEELING AFRAID AS IF SOMETHING AWFUL MIGHT HAPPEN: NOT AT ALL
2. NOT BEING ABLE TO STOP OR CONTROL WORRYING: NOT AT ALL
6. BECOMING EASILY ANNOYED OR IRRITABLE: NOT AT ALL
IF YOU CHECKED OFF ANY PROBLEMS ON THIS QUESTIONNAIRE, HOW DIFFICULT HAVE THESE PROBLEMS MADE IT FOR YOU TO DO YOUR WORK, TAKE CARE OF THINGS AT HOME, OR GET ALONG WITH OTHER PEOPLE: NOT DIFFICULT AT ALL
1. FEELING NERVOUS, ANXIOUS, OR ON EDGE: NOT AT ALL
GAD7 TOTAL SCORE: 0

## 2020-08-21 ASSESSMENT — PATIENT HEALTH QUESTIONNAIRE - PHQ9
5. POOR APPETITE OR OVEREATING: NOT AT ALL
SUM OF ALL RESPONSES TO PHQ QUESTIONS 1-9: 3

## 2020-08-21 NOTE — PROGRESS NOTES
[facility]    Telephone visit  - [phone]    Subjective    Amanda Richardson is a 55 year old female who is being evaluated via a billable telephone visit.      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 A few times a week  What time of day are you checking your blood sugars (select all that apply)?  Before and after meals  Have you had any blood sugars above 200?  No  Have you had any blood sugars below 70?  No    What symptoms do you notice when your blood sugar is low?  None    What concerns do you have today about your diabetes? None     Do you have any of these symptoms? (Select all that apply)  No numbness or tingling in feet.  No redness, sores or blisters on feet.  No complaints of excessive thirst.  No reports of blurry vision.  No significant changes to weight.    Have you had a diabetic eye exam in the last 12 months? No     Testing occasionally  Range 84 to 168  Average fasting 120's    Lab Results   Component Value Date    A1C 6.4 08/03/2019    A1C 6.3 03/17/2019    A1C 6.6 02/09/2019    A1C 6.4 01/30/2019    A1C 6.8 06/20/2018     CKD/Htn/Lipids    Recent Labs   Lab Test 08/03/19  0930 03/18/19  0625  06/20/18  1529  12/08/14  1756 11/13/12  0957   CHOL 124  --   --  141   < > 172 205*   HDL 36*  --   --  33*   < > 36* 32*   LDL 71  --   --  82   < > 97 140*   TRIG 84 218*   < > 130   < > 197* 167*   CHOLHDLRATIO  --   --   --   --   --  4.8 6.5*    < > = values in this interval not displayed.     BP Readings from Last 3 Encounters:   01/29/20 118/86   01/15/20 (!) 144/79   12/18/19 (!) 143/66      Creatinine   Date Value Ref Range Status   08/03/2019 0.47 (L) 0.52 - 1.04 mg/dL Final     Depression    PHQ 6/24/2019 1/29/2020 8/21/2020   PHQ-9 Total Score 4 6 3   Q9: Thoughts of better off dead/self-harm past 2 weeks Not at all Not at all Not at all     REJI-7 SCORE 6/24/2019 1/29/2020 8/21/2020   Total Score 0 1 0     Lymphedema    Wears stockings            Hypertension Follow-up      Do you check your blood pressure regularly outside of the clinic? No     Are you following a low salt diet? No    Are your blood pressures ever more than 140 on the top number (systolic) OR more   than 90 on the bottom number (diastolic), for example 140/90? na      How many servings of fruits and vegetables do you eat daily?  2-3    On average, how many sweetened beverages do you drink each day (Examples: soda, juice, sweet tea, etc.  Do NOT count diet or artificially sweetened beverages)?   0    How many days per week do you exercise enough to make your heart beat faster? none    How many minutes a day do you exercise enough to make your heart beat faster? none    How many days per week do you miss taking your medication? 0    PMH    Past Medical History:   Diagnosis Date     Abnormality of gait 7/27/2012     Arrhythmia     with sepsis     Chronic pain     [facility] - yearly, next in summer 2018     CKD (chronic kidney disease) stage 1, GFR 90 ml/min or greater     kidney stones     Colon polyps 1/15    tubular adenomas x 2     Depressive disorder      Gallstone 6/11/2012     Hyperlipidemia LDL goal <70      Hypertension goal BP (blood pressure) < 140/90      Leukocytosis 6/11/2012     Moderate depressive episode (H)      MS (multiple sclerosis) (H) 2003    Dr Vigil/[facility] - [facility]     Multiple sclerosis (H)      Non Hodgkin's lymphoma (H) 06/11/2012    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 Cystoscopy 2. LEFT retrograde pyelogram 3. LEFT JJ stent placement 4. <1hr physician fluoroscopy time;  Surgeon: Epifanio Sapp MD;  Location: [facility] OR     COMBINED CYSTOSCOPY, RETROGRADES, URETEROSCOPY, LASER HOLMIUM LITHOTRIPSY URETER(S), INSERT STENT Left 3/15/2019    Procedure: Cystoscopy, left ureteral stent exchange, left retrograde pyelogram, interpretation of fluoroscopic images, left ureteroscopy with holmium lithotripsy and stone basketing, 22 modifier for difficult lengthy case.;  Surgeon: Mayito Chauhan MD;  Location: [facility] OR     CYSTOSCOPY       CYSTOSCOPY, REMOVE STENT(S), COMBINED Left 3/27/2019    Procedure: Flexible cystoscopy with left ureteral stent removal;  Surgeon: Mayito Chauhan MD;  Location: [facility] OR     FUSION LUMBAR ANTERIOR, FUSION LUMBAR POSTERIOR TWO LEVELS, COMBINED  10/17/2013    lumbar fusion - Dr Floyd     INSERT PUMP BACLOFEN  04/2017    intrathecal baclofen pump implantation     IRRIGATION AND DEBRIDEMENT LOWER EXTREMITY, COMBINED Right 2015    Right ankle I&D d/t infection     OPEN REDUCTION INTERNAL FIXATION ANKLE  5/15     Right Bimalleolar ankle fx ORIF     OPEN REDUCTION INTERNAL FIXATION ANKLE Right 11/2015    Revision due to spasms pulling screws out of ankle     SINUS SURGERY  2011    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     XR LUMBAR EPIDURAL INJECTION INCL IMAGING  3/14    Left L4-5 Epidural Dr Winter       Medications    Current Outpatient Medications   Medication Sig Dispense Refill     acetaminophen (TYLENOL) 325 MG tablet Take 2 tablets (650 mg) by mouth every 4 hours as needed for mild pain       albuterol (PROVENTIL) (2.5 MG/3ML) 0.083% neb solution Take 1 vial (2.5 mg) by nebulization once for 1 dose 25 vial 1     amitriptyline (ELAVIL) 100 MG tablet Take 1 tablet (100 mg) by mouth At Bedtime 90 tablet 3     aspirin (ASA) 81 MG chewable tablet Take 1 tablet (81 mg) by mouth daily       atorvastatin (LIPITOR) 10 MG tablet Take 1 tablet (10 mg) by mouth daily 90 tablet 3     baclofen (LIORESAL) 10 MG tablet Take 10 mg by mouth 3 times daily as needed        blood glucose (ACCU-CHEK KEL) test strip Use to test blood sugar 1 times daily or as directed. 100 strip 5     blood glucose (NO BRAND SPECIFIED) lancets standard Use to test blood sugar 1 times daily or as directed. 100 each 5     blood glucose calibration (NO BRAND SPECIFIED) solution Use to calibrate blood glucose monitor as needed as directed. 1 each 0     blood glucose monitoring (ACCU-CHEK KEL PLUS) meter device kit Use to test blood sugar 1 times daily or as directed. 1 kit 0     blood glucose monitoring (ACCU-CHEK MULTICLIX) lancets Use to test blood sugar 1 times daily or as directed. 100 each 5     DULoxetine (CYMBALTA) 60 MG capsule Take 1 capsule (60 mg) by mouth 2 times daily 180 capsule 3     Hesperidin-Diosmin 250-650 MG TABS 2 tabs daily per wound care 180 tablet 3     losartan-hydrochlorothiazide (HYZAAR) 50-12.5 MG tablet Take 1 tablet by mouth daily 90 tablet 3     metFORMIN (GLUCOPHAGE) 500 MG tablet TAKE 1 TABLET(500 MG) BY MOUTH  DAILY WITH DINNER 90 tablet 3     metoprolol succinate ER (TOPROL-XL) 50 MG 24 hr tablet Take 1 tablet (50 mg) by mouth daily 90 tablet 1     Multiple Vitamin (MULTI-VITAMIN PO) Take 1 tablet by mouth daily        naloxone (NARCAN) 4 MG/0.1ML nasal spray Spray 1 spray (4 mg) into one nostril alternating nostrils once as needed for opioid reversal Every 2-3 minutes until patient responsive or EMS arrives 0.2 mL 0     omega-3 fatty acids (FISH OIL) 1200 MG capsule Take 1 capsule by mouth daily.       oxyCODONE IR (ROXICODONE) 15 MG tablet Take 1 tablet (15 mg) by mouth every 6 hours as needed for moderate to severe pain Limit 4 tablet(s) per day. 120 tablet 0     senna-docusate (SENOKOT-S/PERICOLACE) 8.6-50 MG tablet Take 2 tablets by mouth daily as needed for constipation 180 tablet 0     vitamin D3 (VITAMIN D3) 1000 units (25 mcg) tablet Take 1 tablet (1,000 Units) by mouth daily         Allergies    Lisinopril; Cyclobenzaprine; and Flexeril [cyclobenzaprine hcl]    Family History    Family History   Problem Relation Age of Onset     C.A.D. Father         with CHF      Cancer Father         ? unsure type - abdominal      Hypertension Mother      Thyroid Disease Mother         goiter      Breast Cancer Sister 58     Thyroid Disease Son      Cancer - colorectal No family hx of        Social History    Social History     Socioeconomic History     Marital status:      Spouse name: Fernando     Number of children: 3     Years of education: 14     Highest education level: Not on file   Occupational History     Employer: UNEMPLOYED   Social Needs     Financial resource strain: Not on file     Food insecurity     Worry: Not on file     Inability: Not on file     Transportation needs     Medical: Not on file     Non-medical: Not on file   Tobacco Use     Smoking status: Former Smoker     Packs/day: 0.50     Years: 0.00     Pack years: 0.00     Types: Cigarettes     Last attempt to quit: 8/4/2013     Years since quitting:  7.0     Smokeless tobacco: Never Used     Tobacco comment: since age 17   Substance and Sexual Activity     Alcohol use: No     Drug use: No     Sexual activity: Yes     Partners: Male   Lifestyle     Physical activity     Days per week: Not on file     Minutes per session: Not on file     Stress: Not on file   Relationships     Social connections     Talks on phone: Not on file     Gets together: Not on file     Attends [employer] service: Not on file     Active member of club or organization: Not on file     Attends meetings of clubs or organizations: Not on file     Relationship status: Not on file     Intimate partner violence     Fear of current or ex partner: Not on file     Emotionally abused: Not on file     Physically abused: Not on file     Forced sexual activity: Not on file   Other Topics Concern     Parent/sibling w/ CABG, MI or angioplasty before 65F 55M? No      Service Not Asked     Blood Transfusions Not Asked     Caffeine Concern Yes     Comment: occas     Occupational Exposure Not Asked     Hobby Hazards Not Asked     Sleep Concern Not Asked     Stress Concern Not Asked     Weight Concern Not Asked     Special Diet Not Asked     Back Care Not Asked     Exercise Yes     Comment: as able     Bike Helmet Not Asked     Seat Belt Yes     Self-Exams Not Asked   Social History Narrative     Not on file       Reviewed and updated as needed this visit by Provider           Review of Systems     CONSTITUTIONAL: NEGATIVE for fever, chills, change in weight  INTEGUMENTARY/SKIN: NEGATIVE for worrisome rashes, moles or lesions  EYES: NEGATIVE for vision changes or irritation  ENT/MOUTH: NEGATIVE for ear, mouth and throat problems  RESP: NEGATIVE for significant cough or SOB  CV: NEGATIVE for chest pain, palpitations or peripheral edema  GI: NEGATIVE for nausea, abdominal pain, heartburn, or change in bowel habits  : NEGATIVE for frequency, dysuria, or hematuria  MUSCULOSKELETAL: NEGATIVE for  significant arthralgias or myalgia  NEURO: NEGATIVE for weakness, dizziness or paresthesias  ENDOCRINE: NEGATIVE for temperature intolerance, skin/hair changes  HEME: NEGATIVE for bleeding problems  PSYCHIATRIC: NEGATIVE for changes in mood or affect    Objective    Reported vitals:  [facility] 12/11/2011      healthy, alert and no distress  Psych: Alert and oriented times 3; coherent speech, normal   rate and volume, able to articulate logical thoughts, able   to abstract reason, no tangential thoughts, no hallucinations   or delusions  Her affect is normal     Diagnostic Test Results:  Labs reviewed in Epic    Assessment/Plan:      ICD-10-CM    1. MS (multiple sclerosis) (H)  G35 PAIN MANAGEMENT REFERRAL     Comprehensive metabolic panel     CBC with platelets     Drug  Screen Comprehensive , Urine with Reported Meds ([facility]) (Pain Care Package)     Hesperidin-Diosmin 250-650 MG TABS   2. Chronic pain syndrome  G89.4 PAIN MANAGEMENT REFERRAL     Drug  Screen Comprehensive , Urine with Reported Meds ([facility]) (Pain Care Package)   3. T-cell lymphoma (H)  C85.90 Comprehensive metabolic panel     CBC with platelets   4. 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 Spinal stenosis of lumbar region, unspecified whether neurogenic claudication present  M48.061 PAIN MANAGEMENT REFERRAL     Drug  Screen Comprehensive , Urine with Reported Meds ([facility]) (Pain Care Package)   6. Lumbar radiculopathy  M54.16 PAIN MANAGEMENT REFERRAL     Drug  Screen Comprehensive , Urine with Reported Meds ([facility]ox) (Pain Care Package)   7. Type 2 diabetes mellitus with stage 1 chronic kidney disease, without long-term current use of insulin (H)  E11.22 Comprehensive metabolic panel    N18.1 Lipid panel reflex to direct LDL Fasting     UA reflex to Microscopic and Culture     Albumin Random Urine Quantitative with  "Creat Ratio     Hemoglobin A1c   8. Type 2 diabetes, HbA1c goal < 7% (H)  E11.9 Comprehensive metabolic panel     Lipid panel reflex to direct LDL Fasting     UA reflex to Microscopic and Culture     Albumin Random Urine Quantitative with Creat Ratio     Hemoglobin A1c   9. CKD (chronic kidney disease) stage 1, GFR 90 ml/min or greater  N18.1 Comprehensive metabolic panel     UA reflex to Microscopic and Culture     Albumin Random Urine Quantitative with Creat Ratio   10. Hypertension goal BP (blood pressure) < 140/90  I10 Comprehensive metabolic panel     UA reflex to Microscopic and Culture     Albumin Random Urine Quantitative with Creat Ratio   11. Hyperlipidemia LDL goal <70  E78.5 Comprehensive metabolic panel     Lipid panel reflex to direct LDL Fasting     CK total   12. Moderate depressive episode (H)  F32.1 TSH with free T4 reflex   13. S/P right hip fracture  Z87.81    14. Lymphedema  I89.0 Comprehensive metabolic panel   15. Morbid obesity (H)  E66.01 TSH with free T4 reflex   16. Medication monitoring encounter  Z51.81 Comprehensive metabolic panel     Lipid panel reflex to direct LDL Fasting     CBC with platelets     CK total     TSH with free T4 reflex     UA reflex to Microscopic and Culture     Albumin Random Urine Quantitative with Creat Ratio     Hemoglobin A1c     Drug  Screen Comprehensive , Urine with Reported Meds (MedTox) (Pain Care Package)     Fasting labs in fall  Pain clinic annual check up  MS clinic follow up  CPX annually  Visits every 3 months  Reviewed rigidity/structure of monthly oxycodone refills    Return in about 3 months (around 11/21/2020), or if symptoms worsen or fail to improve, for Medication Recheck Visit, Follow Up Chronic, Telephone/Video visit.    Phone call duration:  22 minutes    The patient has been notified of following:     \"This telephone visit will be conducted via a call between you and your physician/provider. We have found that certain health care needs can " "be provided without the need for a physical exam.  This service lets us provide the care you need with a short phone conversation.  If a prescription is necessary we can send it directly to your pharmacy.  If lab work is needed we can place an order for that and you can then stop by our lab to have the test done at a later time.    Telephone visits are billed at different rates depending on your insurance coverage. During this emergency period, for some insurers they may be billed the same as an in-person visit.  Please reach out to your insurance provider with any questions.    If during the course of the call the physician/provider feels a telephone visit is not appropriate, you will not be charged for this service.\"    Patient has given verbal consent for Telephone visit?  Yes    How would you like to obtain your AVS? Talita Hassan MD, [facility]  [address] [place] [id number]  [email or web address]   Office: [phone]  Fax: [phone]  Pager: [phone]     "

## 2020-08-21 NOTE — TELEPHONE ENCOUNTER
Request from Chandu Crane stating Daflonex 100-500mg is unable to get.  Please send other option.    I also check with [facility] pharmacy and they can't get this medication either    Please advise

## 2020-08-21 NOTE — TELEPHONE ENCOUNTER

## 2020-08-22 ASSESSMENT — ANXIETY QUESTIONNAIRES: GAD7 TOTAL SCORE: 0

## 2020-09-11 ENCOUNTER — VIRTUAL VISIT (OUTPATIENT)
Dept: PALLIATIVE MEDICINE | Facility: CLINIC | Age: 55
End: 2020-09-11
Payer: COMMERCIAL

## 2020-09-11 DIAGNOSIS — M54.16 LUMBAR RADICULOPATHY: ICD-10-CM

## 2020-09-11 DIAGNOSIS — G89.4 CHRONIC PAIN SYNDROME: ICD-10-CM

## 2020-09-11 DIAGNOSIS — G35 MS (MULTIPLE SCLEROSIS) (H): ICD-10-CM

## 2020-09-11 DIAGNOSIS — M48.061 SPINAL STENOSIS OF LUMBAR REGION, UNSPECIFIED WHETHER NEUROGENIC CLAUDICATION PRESENT: ICD-10-CM

## 2020-09-11 DIAGNOSIS — Z53.9 NO SHOW: Primary | ICD-10-CM

## 2020-09-11 DIAGNOSIS — C85.81 OTHER SPECIFIED TYPE OF NON-HODGKIN LYMPHOMA OF HEAD (H): ICD-10-CM

## 2020-09-16 ENCOUNTER — MYC REFILL (OUTPATIENT)
Dept: FAMILY MEDICINE | Facility: CLINIC | Age: 55
End: 2020-09-16

## 2020-09-16 DIAGNOSIS — G89.4 CHRONIC PAIN SYNDROME: ICD-10-CM

## 2020-09-16 DIAGNOSIS — G35 MS (MULTIPLE SCLEROSIS) (H): ICD-10-CM

## 2020-09-16 RX ORDER — OXYCODONE HYDROCHLORIDE 15 MG/1
15 TABLET ORAL EVERY 6 HOURS PRN
Qty: 120 TABLET | Refills: 0 | Status: SHIPPED | OUTPATIENT
Start: 2020-09-20 | End: 2020-10-16

## 2020-09-17 NOTE — TELEPHONE ENCOUNTER
Reviewed, rx on time, recent visit, missed appt with pain clinic, please reschedule   TRANSFER - OUT REPORT:    Verbal report given [facility]  being transferred to care unit(unit) for ordered procedure       Report consisted of patients Situation, Background, Assessment and   Recommendations(SBAR). Information from the following report(s) SBAR was reviewed with the receiving nurse. Opportunity for questions and clarification was provided.         HU/Cardioversion Report:    Cardioversion Attempts:  Joules:  Current Rhythm:    Medications:   Fentanyl   150 mcg   Demerol   Versed    5 mg   Heparin   Cetacaine Spray Time: 1136am    Other:      Extended / Orthostatic Vitals:    Vital Signs  Level of Consciousness: Alert (09/27/17 1200)  Temp: 98.3 °F (36.8 °C) (09/27/17 1200)  Temp Source: Oral (09/27/17 1200)  Pulse (Heart Rate): 80 (09/27/17 1200)  Heart Rate Source: Monitor (09/27/17 1200)  Cardiac Rhythm: Normal sinus rhythm (09/27/17 1200)  Resp Rate: 19 (09/27/17 1200)  BP: 124/69 (09/27/17 1200)  MAP (Calculated): 87 (09/27/17 1200)  BP 1 Location: Right arm (09/27/17 1200)  BP 1 Method: Automatic (09/27/17 1200)  BP Patient Position: At rest (09/27/17 1200)  MEWS Score: 1 (09/27/17 1200)         Oxygen Therapy  O2 Sat (%): 93 % (09/27/17 1200)  O2 Device: Nasal cannula (09/27/17 1200)  O2 Flow Rate (L/min): 2 l/min (09/27/17 1200)      Pt accompanied by:    Monitor

## 2020-10-16 ENCOUNTER — MYC REFILL (OUTPATIENT)
Dept: FAMILY MEDICINE | Facility: CLINIC | Age: 55
End: 2020-10-16

## 2020-10-16 DIAGNOSIS — G35 MS (MULTIPLE SCLEROSIS) (H): ICD-10-CM

## 2020-10-16 DIAGNOSIS — G89.4 CHRONIC PAIN SYNDROME: ICD-10-CM

## 2020-10-16 RX ORDER — OXYCODONE HYDROCHLORIDE 15 MG/1
15 TABLET ORAL EVERY 6 HOURS PRN
Qty: 120 TABLET | Refills: 0 | Status: SHIPPED | OUTPATIENT
Start: 2020-10-20 | End: 2020-11-17

## 2020-10-16 NOTE — TELEPHONE ENCOUNTER
Controlled Substance Refill Request for oxyCODONE IR (ROXICODONE) 15 MG tablet  Problem List Complete:    Yes    Last Written Prescription Date:  9-20-20  Last Fill Quantity: 120 tablet,   # refills: 0        Last Office Visit with [facility] primary care provider: 8-21-20    Future Office visit:     Controlled substance agreement:   Encounter-Level CSA - 02/12/2018:    Controlled Substance Agreement - Scan on 2/28/2018 10:12 AM: CONTROLLED SUBSTANCE AGREEMENT     Encounter-Level CSA - 06/09/2016:    Controlled Substance Agreement - Scan on 6/17/2016 10:49 AM: CONTROLLED SUBSTANCE AGREEMENT     Encounter-Level CSA - 04/03/2015:    Controlled Substance Agreement - Scan on 4/12/2015 10:28 AM: Controlled Medication Agreement 04/03/15     Patient-Level CSA:    There are no patient-level csa.         Last Urine Drug Screen:   Pain Drug SCR UR W RPTD Meds   Date Value Ref Range Status   11/05/2019 FINAL  Final     Comment:     (Note)  ====================================================================  TOXASSURE COMP DRUG ANALYSIS,UR  ====================================================================  Test                             Result       Flag       Units        Drug Present   Oxycodone                      2463                    ng/mg creat   Oxymorphone                    4415                    ng/mg creat   Noroxycodone                   4391                    ng/mg creat   Noroxymorphone                 1432                    ng/mg creat    Sources of oxycodone are scheduled prescription medications.    Oxymorphone, noroxycodone, and noroxymorphone are expected    metabolites of oxycodone. Oxymorphone is also available as a    scheduled prescription medication.   Gabapentin                     PRESENT                               Duloxetine                     PRESENT                               Salicylate                     PRESENT                               Metoprolol                     PRESENT                               ====================================================================  Test                      Result    Flag   Units      Ref Range        Creatinine              97               mg/dL      >=20            ====================================================================  Declared Medications:  Medication list was not provided.  ====================================================================  For clinical consultation, please call [phone].  ====================================================================  Analysis performed by [facility] [employer] [place] [address]     , No results found for: COMDAT, No results found for: THC13, PCP13, COC13, MAMP13, OPI13, AMP13, BZO13, TCA13, MTD13, BAR13, OXY13, PPX13, BUP13     Processing:  Fax Rx to listed pharmacy    [email or web address]   checked in past 3 months?  No, route to RN

## 2020-10-16 NOTE — TELEPHONE ENCOUNTER
Due for wellness.    Postdated prescription(s) have been sent. Please notify the patient, if necessary.    Last visit in this dept:    8/21/2020     Next visit in this dept:       Health Maintenance   Topic Date Due     EYE EXAM  1965     HEPATITIS B IMMUNIZATION (1 of 3 - Risk 3-dose series) 05/08/1982     COLORECTAL CANCER SCREENING  01/09/2015     MEDICARE ANNUAL WELLNESS VISIT  05/02/2017     DEXA  06/26/2017     HPV TEST  05/02/2019     PAP  05/02/2019     MAMMO SCREENING  07/09/2019     A1C  02/03/2020     BMP  08/03/2020     LIPID  08/03/2020     INFLUENZA VACCINE (1) 09/01/2020     MICROALBUMIN  11/05/2020     URINE DRUG SCREEN  11/05/2020     DIABETIC FOOT EXAM  01/29/2021     Pneumococcal Vaccine: Pediatrics (0 to 5 Years) and At-Risk Patients (6 to 64 Years) (3 of 3 - PCV13) 01/29/2021     PHQ-9  02/21/2021     ADVANCE CARE PLANNING  03/28/2024     DTAP/TDAP/TD IMMUNIZATION (3 - Td) 09/19/2028     HEPATITIS C SCREENING  Completed     HIV SCREENING  Completed     DEPRESSION ACTION PLAN  Completed     ZOSTER IMMUNIZATION  Completed     IPV IMMUNIZATION  Aged Out     MENINGITIS IMMUNIZATION  Aged Out

## 2020-10-19 ENCOUNTER — MYC MEDICAL ADVICE (OUTPATIENT)
Dept: FAMILY MEDICINE | Facility: CLINIC | Age: 55
End: 2020-10-19

## 2020-10-20 NOTE — TELEPHONE ENCOUNTER
[employer] message sent.    Anival RIOS RN   [facility]

## 2020-10-23 DIAGNOSIS — N18.1 TYPE 2 DIABETES MELLITUS WITH STAGE 1 CHRONIC KIDNEY DISEASE, WITHOUT LONG-TERM CURRENT USE OF INSULIN (H): ICD-10-CM

## 2020-10-23 DIAGNOSIS — I10 HYPERTENSION GOAL BP (BLOOD PRESSURE) < 140/90: ICD-10-CM

## 2020-10-23 DIAGNOSIS — E11.22 TYPE 2 DIABETES MELLITUS WITH STAGE 1 CHRONIC KIDNEY DISEASE, WITHOUT LONG-TERM CURRENT USE OF INSULIN (H): ICD-10-CM

## 2020-10-23 RX ORDER — LOSARTAN POTASSIUM AND HYDROCHLOROTHIAZIDE 12.5; 5 MG/1; MG/1
1 TABLET ORAL DAILY
Qty: 90 TABLET | Refills: 3 | Status: SHIPPED | OUTPATIENT
Start: 2020-10-23 | End: 2021-01-01

## 2020-10-23 NOTE — TELEPHONE ENCOUNTER
Routing refill request to provider for review/approval because:  Drug interaction warning  Labs not current:  Creatinine, potassium and sodium

## 2020-11-17 ENCOUNTER — MYC REFILL (OUTPATIENT)
Dept: FAMILY MEDICINE | Facility: CLINIC | Age: 55
End: 2020-11-17

## 2020-11-17 DIAGNOSIS — G35 MS (MULTIPLE SCLEROSIS) (H): ICD-10-CM

## 2020-11-17 DIAGNOSIS — G89.4 CHRONIC PAIN SYNDROME: ICD-10-CM

## 2020-11-17 NOTE — TELEPHONE ENCOUNTER
Controlled Substance Refill Request for oxyCODONE IR (ROXICODONE) 15 MG tablet  Problem List Complete:    Yes    Last Written Prescription Date:  10.20.20  Last Fill Quantity: 120 tablet,   # refills: 0        Last Office Visit with [facility] primary care provider: 8.21.20    Future Office visit:     Controlled substance agreement:   Encounter-Level CSA - 02/12/2018:    Controlled Substance Agreement - Scan on 2/28/2018 10:12 AM: CONTROLLED SUBSTANCE AGREEMENT     Encounter-Level CSA - 06/09/2016:    Controlled Substance Agreement - Scan on 6/17/2016 10:49 AM: CONTROLLED SUBSTANCE AGREEMENT     Encounter-Level CSA - 04/03/2015:    Controlled Substance Agreement - Scan on 4/12/2015 10:28 AM: Controlled Medication Agreement 04/03/15     Patient-Level CSA:    There are no patient-level csa.         Last Urine Drug Screen:   Pain Drug SCR UR W RPTD Meds   Date Value Ref Range Status   11/05/2019 FINAL  Final     Comment:     (Note)  ====================================================================  [facility] COMP DRUG ANALYSIS,UR  ====================================================================  Test                             Result       Flag       Units        Drug Present   Oxycodone                      2463                    ng/mg creat   Oxymorphone                    4415                    ng/mg creat   Noroxycodone                   4391                    ng/mg creat   Noroxymorphone                 1432                    ng/mg creat    Sources of oxycodone are scheduled prescription medications.    Oxymorphone, noroxycodone, and noroxymorphone are expected    metabolites of oxycodone. Oxymorphone is also available as a    scheduled prescription medication.   Gabapentin                     PRESENT                               Duloxetine                     PRESENT                               Salicylate                     PRESENT                               Metoprolol                     PRESENT                               ====================================================================  Test                      Result    Flag   Units      Ref Range        Creatinine              97               mg/dL      >=20            ====================================================================  Declared Medications:  Medication list was not provided.  ====================================================================  For clinical consultation, please call [phone].  ====================================================================  Analysis performed by [employer] [place] [address]     , No results found for: COMDAT, No results found for: THC13, PCP13, COC13, MAMP13, OPI13, AMP13, BZO13, TCA13, MTD13, BAR13, OXY13, PPX13, BUP13     Processing:  Fax Rx to listed pharmacy    [email or web address]   checked in past 3 months?  No, route to RN

## 2020-11-18 RX ORDER — OXYCODONE HYDROCHLORIDE 15 MG/1
15 TABLET ORAL EVERY 6 HOURS PRN
Qty: 120 TABLET | Refills: 0 | Status: SHIPPED | OUTPATIENT
Start: 2020-11-19 | End: 2020-12-18

## 2020-11-18 RX ORDER — OXYCODONE HYDROCHLORIDE 15 MG/1
15 TABLET ORAL EVERY 6 HOURS PRN
Qty: 120 TABLET | Refills: 0 | Status: SHIPPED | OUTPATIENT
Start: 2020-11-19 | End: 2020-11-18

## 2020-11-18 NOTE — TELEPHONE ENCOUNTER
Routing refill request to provider for review/approval because:  Drug not on the [facility] refill protocol     Cordelia Stevens RN, BSN  [facility]

## 2020-11-18 NOTE — TELEPHONE ENCOUNTER
Pt will be advised of E-scribing errors and need to  Rx at . Writer placed at front for .    Attempt # 1  Called # [phone]     Left a non detailed VM to call back at [phone] and ask for any available Triage Nurse.    Anival Goodwin RN   [facility] [place] [facility]

## 2020-11-18 NOTE — TELEPHONE ENCOUNTER
Pt called back and advised of note below. Patient stated an understanding and agreed with plan.    Anival RIOS RN   [facility]

## 2020-11-23 ENCOUNTER — VIRTUAL VISIT (OUTPATIENT)
Dept: FAMILY MEDICINE | Facility: CLINIC | Age: 55
End: 2020-11-23
Payer: COMMERCIAL

## 2020-11-23 DIAGNOSIS — F32.A MODERATE DEPRESSIVE EPISODE: ICD-10-CM

## 2020-11-23 DIAGNOSIS — Z79.899 CONTROLLED SUBSTANCE AGREEMENT SIGNED: ICD-10-CM

## 2020-11-23 DIAGNOSIS — M48.061 SPINAL STENOSIS OF LUMBAR REGION, UNSPECIFIED WHETHER NEUROGENIC CLAUDICATION PRESENT: ICD-10-CM

## 2020-11-23 DIAGNOSIS — C85.81 OTHER SPECIFIED TYPE OF NON-HODGKIN LYMPHOMA OF HEAD (H): ICD-10-CM

## 2020-11-23 DIAGNOSIS — M54.16 LUMBAR RADICULOPATHY: ICD-10-CM

## 2020-11-23 DIAGNOSIS — Z51.81 MEDICATION MONITORING ENCOUNTER: ICD-10-CM

## 2020-11-23 DIAGNOSIS — E11.22 TYPE 2 DIABETES MELLITUS WITH STAGE 1 CHRONIC KIDNEY DISEASE, WITHOUT LONG-TERM CURRENT USE OF INSULIN (H): ICD-10-CM

## 2020-11-23 DIAGNOSIS — G35 MS (MULTIPLE SCLEROSIS) (H): Primary | ICD-10-CM

## 2020-11-23 DIAGNOSIS — C85.90 T-CELL LYMPHOMA (H): ICD-10-CM

## 2020-11-23 DIAGNOSIS — R30.0 DYSURIA: ICD-10-CM

## 2020-11-23 DIAGNOSIS — I10 HYPERTENSION GOAL BP (BLOOD PRESSURE) < 140/90: ICD-10-CM

## 2020-11-23 DIAGNOSIS — E78.5 HYPERLIPIDEMIA LDL GOAL <70: ICD-10-CM

## 2020-11-23 DIAGNOSIS — E11.9 TYPE 2 DIABETES, HBA1C GOAL < 7% (H): ICD-10-CM

## 2020-11-23 DIAGNOSIS — Z79.899 OTHER LONG TERM (CURRENT) DRUG THERAPY: ICD-10-CM

## 2020-11-23 DIAGNOSIS — N18.1 CKD (CHRONIC KIDNEY DISEASE) STAGE 1, GFR 90 ML/MIN OR GREATER: ICD-10-CM

## 2020-11-23 DIAGNOSIS — N18.1 TYPE 2 DIABETES MELLITUS WITH STAGE 1 CHRONIC KIDNEY DISEASE, WITHOUT LONG-TERM CURRENT USE OF INSULIN (H): ICD-10-CM

## 2020-11-23 DIAGNOSIS — G89.4 CHRONIC PAIN SYNDROME: ICD-10-CM

## 2020-11-23 PROCEDURE — 99214 OFFICE O/P EST MOD 30 MIN: CPT | Mod: 95 | Performed by: FAMILY MEDICINE

## 2020-11-23 NOTE — LETTER
[facility] [place]  11/23/20    Patient: Amanda Richardson  YOB: 1965  Medical Record Number: [id number]                                                                  Opioid / Opioid Plus Controlled Substance Agreement    I understand that my care provider has prescribed an opioid (narcotic) controlled substance to help manage my condition(s). I am taking this medicine to help me function or work. I know this is strong medicine, and that it can cause serious side effects. Opioid medicine can be sedating, addicting and may cause a dependency on the drug. They can affect my ability to drive or think, and cause depression. They need to be taken exactly as prescribed. Combining opioids with certain medicines or chemicals (such as cocaine, sedatives and tranquilizers, sleeping pills, meth) can be dangerous or even fatal. Also, if I stop opioids suddenly, I may have severe withdrawal symptoms. Last, I understand that opioids do not work for all types of pain nor for all patients. If not helpful, I may be asked to stop them.        The risks, benefits, and side effects of these medicine(s) were explained to me. I agree that:    1. I will take part in other treatments as advised by my care team. This may be psychiatry or counseling, physical therapy, behavioral therapy, group treatment or a referral to a pain clinic. I will reduce or stop my medicine when my care team tells me to do so.  2. I will take my medicines as prescribed. I will not change the dose or schedule unless my care team tells me to. There will be no refills if I  run out early.   I may be contactedwithout warning and asked to complete a urine drug test or pill count at any time.   3. I will keep all my appointments, and understand this is part of the monitoring of opioids. My care team may require an office visit for EVERY opioid/controlled substance refill. If I miss appointments or don t follow instructions, my care team  may stop my medicine.  4. I will not ask other providers to prescribe controlled substances, and I will not accept controlled substances from other people. If I need another prescribed controlled substance for a new reason, I will tell my care team within 1 business day.  5. I will use one pharmacy to fill all of my controlled substance prescriptions, and it is up to me to make sure that I do not run out of my medicines on weekends or holidays. If my care team is willing to refill my opioid prescription without a visit, I must request refills only during office hours, refills may take up to 3 days to process, and it may take up to 5 to 7 days for my medicine to be mailed and ready at my pharmacy. Prescriptions will not be mailed anywhere except my pharmacy.        [id number]  Rev 12/18         Registration to scan to EHR                             Page 1 of 2               Controlled Substance Agreement [facility] [place]  11/23/20  Patient: Amanda Richardson  YOB: 1965  Medical Record Number: [id number]                                                                  6. I am responsible for my prescriptions. If the medicine/prescription is lost or stolen, it will not be replaced. I also agree not to share controlled substance medicines with anyone.  7. I agree to not use ANY illegal or recreational drugs. This includes marijuana, cocaine, bath salts or other drugs. I agree not to use alcohol unless my care team says I may.          I agree to give urine samples whenever asked. If I don t give a urine sample, the care team may stop my medicine.    8. If I enroll in the [place] Medical Marijuana program, I will tell my care team. I will also sign an agreement to share my medical records with my care team.   9. I will bring in my list of medicines (or my medicine bottles) each time I come to the clinic.   10. I will tell my care team right away if I become pregnant or have a  new medical problem treated outside of my regular clinic.  11. I understand that this medicine can affect my thinking and judgment. It may be unsafe for me to drive, use machinery and do dangerous tasks. I will not do any of these things until I know how the medicine affects me. If my dose changes, I will wait to see how it affects me. I will contact my care team if I have concerns about medicine side effects.    I understand that if I do not follow any of the conditions above, my prescriptions or treatment may be stopped.      I agree that my provider, clinic care team, and pharmacy may work with any city, state or federal law enforcement agency that investigates the misuse, sale, or other diversion of my controlled medicine. I will allow my provider to discuss my care with or share a copy of this agreement with any other treating provider, pharmacy or emergency room where I receive care. I agree to give up (waive) any right of privacy or confidentiality with respect to these consents.     I have read this agreement and have asked questions about anything I did not understand.      ________________________________________________________________________  Patient signature - Date/Time -  Amanda Richardson                                      ________________________________________________________________________  Witness signature                                                            ________________________________________________________________________  Provider signature - Julius Hassan MD      [id number]  Rev 12/18         Registration to scan to EHR                         Page 2 of 2                   Controlled Substance Agreement Opioid           Page 1 of 2  Opioid Pain Medicines (also known as Narcotics)  What You Need to Know    What are opioids?   Opioids are pain medicines that must be prescribed by a doctor.  They are also known as narcotics.    Examples are:     morphine (MS Contin,  Lea)    oxycodone (Oxycontin)    oxycodone and acetaminophen (Percocet)    hydrocodone and acetaminophen (Vicodin, Norco)     fentanyl patch (Duragesic)     hydromorphone (Dilaudid)     methadone     What do opioids do well?   Opioids are best for short-term pain after a surgery or injury. They also work well for cancer pain. Unlike other pain medicines, they do not cause liver or kidney failure or ulcers. They may help some people with long-lasting (chronic) pain.     What do opioids NOT do well?   Opioids never get rid of pain entirely, and they do not work well for most patients with chronic pain. Opioids do not reduce swelling, one of the causes of pain. They also don t work well for nerve pain.                           For informational purposes only.  Not to replace the advice of your care provider.  Copyright [facility]. All right reserved. [employer] [facility] 405638-[id number] 02/18.      Page 2 of 2    Risks and side effects   Talk to your doctor before you start or decide to keep taking one of these medicines. Side effects include:    Lowering your breathing rate enough to cause death    Overdose, including death, especially if taking higher than prescribed doses    Long-term opioid use    Worse depression symptoms; less pleasure in things you usually enjoy    Feeling tired or sluggish    Slower thoughts or cloudy thinking    Being more sensitive to pain over time; pain is harder to control    Trouble sleeping or restless sleep    Changes in hormone levels (for example, less testosterone)    Changes in sex drive or ability to have sex    Constipation    Unsafe driving    Itching and sweating    Feeling dizzy    Nausea, vomiting and dry mouth    What else should I know about opioids?  When someone takes opioids for too long or too often, they become dependent. This means that if you stop or reduce the medicine too quickly, you will have withdrawal symptoms.    Dependence is not the same as addiction.  Addiction is when people keep using a substance that harms their body, their mind or their relations with others. If you have a history of drug or alcohol abuse, taking opioids can cause a relapse.    Over time, opioids don t work as well. Most people will need higher and higher doses. The higher the dose, the more serious the side effects. We don t know the long-term effects of opioids.      Prescribed opioids aren't the best way to manage chronic pain    Other ways to manage pain include:      Ibuprofen or acetaminophen.  You should always try this first.      Treat health problems that may be causing pain.      acupuncture or massage, deep breathing, meditation, visual imagery, aromatherapy.      Use heat or ice at the pain site      Physical therapy and exercise      Stop smoking      See a counselor or therapist                                                  People who have used opioids for a long time may have a lower quality of life, worse depression, higher levels of pain and more visits to doctors.    Never share your opioids with others. Be sure to store opioids in a secure place, locked if possible.Young children can easily swallow them and overdose.     You can overdose on opioids.  Signs of overdose include decrease or loss of consciousness, slowed breathing, trouble waking and blue lips.  If someone is worried about overdose, they should call 911.    If you are at risk for overdose, you may get naloxone (Narcan, a medicine that reverses the effects of opioids.  If you overdose, a friend or family member can give you Narcan while waiting for the ambulance.  They need to know the signs of overdose and how to give Narcan.    While you're taking opioids:    Don't use alcohol or street drugs. Taking them together can cause death.    Don't take any of these medicines unless your doctor says its okay.  Taking these with opioids can cause death.    Benzodiazepines (such as lorazepam         or  diazepam)    Muscle relaxers (such as cyclobenzaprine)    sleeping pills    other opioids    Safe disposal of opioids  Find your area drug take-back program, your pharmacy mail-back program, buy a special disposal bag (such as Deterra) from your pharmacy or flush them down the toilet.  Use the guidelines at:  www.fda.gov/drugs/resourcesforyou

## 2020-11-23 NOTE — PROGRESS NOTES
[facility] [place]    Telephone visit  - [phone]    Subjective    Amanda iRchardson is a 55 year old female who is being evaluated via a billable telephone visit.      Chief Complaint   Patient presents with     Recheck Medication     MS - Dr Grfifin - recent relapse with concerns for infection - will check urine, no f/c/s, no sinus/uri  Sx, no loss of taste/smell, decreased appetite, decreased sleep secondary to spasm, recent increase in baclofen pump, no uti sx except incontinence has increased with freq/urg    Diabetes Follow-up    How often are you checking your blood sugar? Two times daily  Blood sugar testing frequency justification:  Uncontrolled diabetes  What time of day are you checking your blood sugars (select all that apply)?  Before and after meals  Have you had any blood sugars above 200?  No  Have you had any blood sugars below 70?  No    What symptoms do you notice when your blood sugar is low?  fatigue    What concerns do you have today about your diabetes? None     Do you have any of these symptoms? (Select all that apply)  No numbness or tingling in feet.  No redness, sores or blisters on feet.  No complaints of excessive thirst.  No reports of blurry vision.  No significant changes to weight.    Have you had a diabetic eye exam in the last 12 months? No    Range 90 to 157, average 115    Lab Results   Component Value Date    A1C 6.4 08/03/2019    A1C 6.3 03/17/2019    A1C 6.6 02/09/2019    A1C 6.4 01/30/2019    A1C 6.8 06/20/2018             Hyperlipidemia Follow-Up      Are you regularly taking any medication or supplement to lower your cholesterol?   Yes- atorvastatin    Are you having muscle aches or other side effects that you think could be caused by your cholesterol lowering medication?  No     Recent Labs   Lab Test 08/03/19  0930 03/18/19  0625 06/20/18  1529 06/20/18  1529 12/08/14  1756 12/08/14  1756 11/13/12  0957   CHOL 124  --   --  141   < > 172 205*   HDL 36*   --   --  33*   < > 36* 32*   LDL 71  --   --  82   < > 97 140*   TRIG 84 218*   < > 130   < > 197* 167*   CHOLHDLRATIO  --   --   --   --   --  4.8 6.5*    < > = values in this interval not displayed.     Hypertension Follow-up      Do you check your blood pressure regularly outside of the clinic? No     Are you following a low salt diet? Yes    Are your blood pressures ever more than 140 on the top number (systolic) OR more   than 90 on the bottom number (diastolic), for example 140/90? na    BP Readings from Last 3 Encounters:   01/29/20 118/86   01/15/20 (!) 144/79   12/18/19 (!) 143/66     Creatinine   Date Value Ref Range Status   08/03/2019 0.47 (L) 0.52 - 1.04 mg/dL Final     GFR Estimate   Date Value Ref Range Status   08/03/2019 >90 >60 mL/min/[1.73_m2] Final     Comment:     Non  GFR Calc  Starting 12/18/2018, serum creatinine based estimated GFR (eGFR) will be   calculated using the Chronic Kidney Disease Epidemiology Collaboration   (CKD-EPI) equation.       GFR Estimate If Black   Date Value Ref Range Status   08/03/2019 >90 >60 mL/min/[1.73_m2] Final     Comment:      GFR Calc  Starting 12/18/2018, serum creatinine based estimated GFR (eGFR) will be   calculated using the Chronic Kidney Disease Epidemiology Collaboration   (CKD-EPI) equation.           How many servings of fruits and vegetables do you eat daily?  4 or more    On average, how many sweetened beverages do you drink each day (Examples: soda, juice, sweet tea, etc.  Do NOT count diet or artificially sweetened beverages)?   0    How many days per week do you exercise enough to make your heart beat faster? 3 or less    How many minutes a day do you exercise enough to make your heart beat faster?     How many days per week do you miss taking your medication? 0      PMH    Past Medical History:   Diagnosis Date     Abnormality of gait 7/27/2012     Arrhythmia     with sepsis     Chronic pain      Pain Clinic -  yearly, next in summer 2018     CKD (chronic kidney disease) stage 1, GFR 90 ml/min or greater     kidney stones     Colon polyps 1/15    tubular adenomas x 2     Depressive disorder      Gallstone 6/11/2012     Hyperlipidemia LDL goal <70      Hypertension goal BP (blood pressure) < 140/90      Leukocytosis 6/11/2012     Moderate depressive episode (H)      MS (multiple sclerosis) (H) 2003    Dr Vigil/Gaby - [facility]     Multiple sclerosis (H)      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 Cystoscopy 2. LEFT retrograde pyelogram 3. LEFT JJ stent placement 4. <1hr physician fluoroscopy time;  Surgeon: Epifanio Sapp MD;  Location: [facility] OR     COMBINED CYSTOSCOPY, RETROGRADES, URETEROSCOPY, LASER HOLMIUM LITHOTRIPSY URETER(S), INSERT STENT Left 3/15/2019    Procedure: Cystoscopy, left ureteral stent exchange, left retrograde pyelogram, interpretation of fluoroscopic images, left ureteroscopy with holmium  lithotripsy and stone basketing, 22 modifier for difficult lengthy case.;  Surgeon: Mayito Chauhan MD;  Location: [facility] OR     CYSTOSCOPY       CYSTOSCOPY, REMOVE STENT(S), COMBINED Left 3/27/2019    Procedure: Flexible cystoscopy with left ureteral stent removal;  Surgeon: Mayito Chauhan MD;  Location: [facility] OR     FUSION LUMBAR ANTERIOR, FUSION LUMBAR POSTERIOR TWO LEVELS, COMBINED  10/17/2013    lumbar fusion - Dr Floyd     INSERT PUMP BACLOFEN  04/2017    intrathecal baclofen pump implantation     IRRIGATION AND DEBRIDEMENT LOWER EXTREMITY, COMBINED Right 2015    Right ankle I&D d/t infection     OPEN REDUCTION INTERNAL FIXATION ANKLE  5/15    Right Bimalleolar ankle fx ORIF     OPEN REDUCTION INTERNAL FIXATION ANKLE Right 11/2015    Revision due to spasms pulling screws out of ankle     SINUS SURGERY  2011    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     XR LUMBAR EPIDURAL INJECTION INCL IMAGING  3/14    Left L4-5 Epidural Dr Winter       Medications    Current Outpatient Medications   Medication Sig Dispense Refill     acetaminophen (TYLENOL) 325 MG tablet Take 2 tablets (650 mg) by mouth every 4 hours as needed for mild pain       albuterol (PROVENTIL) (2.5 MG/3ML) 0.083% neb solution Take 1 vial (2.5 mg) by nebulization once for 1 dose 25 vial 1     amitriptyline (ELAVIL) 100 MG tablet Take 1 tablet (100 mg) by mouth At Bedtime 90 tablet 3     aspirin (ASA) 81 MG chewable tablet Take 1 tablet (81 mg) by mouth daily       atorvastatin (LIPITOR) 10 MG tablet Take 1 tablet (10 mg) by mouth daily 90 tablet 3     baclofen (LIORESAL) 10 MG tablet 10 mg by Per G Tube route 3 times daily as needed Doesn't take oral - it's in her pump       blood glucose (ACCU-CHEK KEL) test strip Use to test blood sugar 1 times daily or as directed. 100 strip 5     blood glucose (NO BRAND SPECIFIED) lancets standard Use to test blood sugar 1 times daily or as directed. 100 each 5     blood glucose  calibration (NO BRAND SPECIFIED) solution Use to calibrate blood glucose monitor as needed as directed. 1 each 0     blood glucose monitoring (ACCU-CHEK KEL PLUS) meter device kit Use to test blood sugar 1 times daily or as directed. 1 kit 0     blood glucose monitoring (ACCU-CHEK MULTICLIX) lancets Use to test blood sugar 1 times daily or as directed. 100 each 5     DULoxetine (CYMBALTA) 60 MG capsule Take 1 capsule (60 mg) by mouth 2 times daily 180 capsule 3     Hesperidin-Diosmin 250-650 MG TABS 2 tabs daily per wound care 180 tablet 3     losartan-hydrochlorothiazide (HYZAAR) 50-12.5 MG tablet TAKE 1 TABLET BY MOUTH DAILY 90 tablet 3     metFORMIN (GLUCOPHAGE) 500 MG tablet TAKE 1 TABLET(500 MG) BY MOUTH DAILY WITH DINNER 90 tablet 3     metoprolol succinate ER (TOPROL-XL) 50 MG 24 hr tablet Take 1 tablet (50 mg) by mouth daily 90 tablet 1     Multiple Vitamin (MULTI-VITAMIN PO) Take 1 tablet by mouth daily        naloxone (NARCAN) 4 MG/0.1ML nasal spray Spray 1 spray (4 mg) into one nostril alternating nostrils once as needed for opioid reversal Every 2-3 minutes until patient responsive or EMS arrives 0.2 mL 0     omega-3 fatty acids (FISH OIL) 1200 MG capsule Take 1 capsule by mouth daily.       oxyCODONE IR (ROXICODONE) 15 MG tablet Take 1 tablet (15 mg) by mouth every 6 hours as needed for moderate to severe pain Limit 4 tablet(s) per day. 120 tablet 0     senna-docusate (SENOKOT-S/PERICOLACE) 8.6-50 MG tablet Take 2 tablets by mouth daily as needed for constipation 180 tablet 0     vitamin D3 (VITAMIN D3) 1000 units (25 mcg) tablet Take 1 tablet (1,000 Units) by mouth daily         Allergies    Lisinopril, Cyclobenzaprine, and Flexeril [cyclobenzaprine hcl]    Family History    Family History   Problem Relation Age of Onset     C.A.D. Father         with CHF      Cancer Father         ? unsure type - abdominal      Hypertension Mother      Thyroid Disease Mother         goiter      Breast Cancer Sister  58     Thyroid Disease Son      Cancer - colorectal No family hx of        Social History    Social History     Socioeconomic History     Marital status:      Spouse name: Fernando     Number of children: 3     Years of education: 14     Highest education level: Not on file   Occupational History     Employer: UNEMPLOYED   Social Needs     Financial resource strain: Not on file     Food insecurity     Worry: Not on file     Inability: Not on file     Transportation needs     Medical: Not on file     Non-medical: Not on file   Tobacco Use     Smoking status: Former Smoker     Packs/day: 0.50     Years: 0.00     Pack years: 0.00     Types: Cigarettes     Quit date: 2013     Years since quittin.3     Smokeless tobacco: Never Used     Tobacco comment: since age 17   Substance and Sexual Activity     Alcohol use: No     Drug use: No     Sexual activity: Yes     Partners: Male   Lifestyle     Physical activity     Days per week: Not on file     Minutes per session: Not on file     Stress: Not on file   Relationships     Social connections     Talks on phone: Not on file     Gets together: Not on file     Attends Yazidism service: Not on file     Active member of club or organization: Not on file     Attends meetings of clubs or organizations: Not on file     Relationship status: Not on file     Intimate partner violence     Fear of current or ex partner: Not on file     Emotionally abused: Not on file     Physically abused: Not on file     Forced sexual activity: Not on file   Other Topics Concern     Parent/sibling w/ CABG, MI or angioplasty before 65F 55M? No      Service Not Asked     Blood Transfusions Not Asked     Caffeine Concern Yes     Comment: occas     Occupational Exposure Not Asked     Hobby Hazards Not Asked     Sleep Concern Not Asked     Stress Concern Not Asked     Weight Concern Not Asked     Special Diet Not Asked     Back Care Not Asked     Exercise Yes     Comment: as able     Bike  Helmet Not Asked     Seat Belt Yes     Self-Exams Not Asked   Social History Narrative     Not on file       Reviewed and updated as needed this visit by Provider                   Review of Systems     CONSTITUTIONAL: NEGATIVE for fever, chills, change in weight  INTEGUMENTARY/SKIN: NEGATIVE for worrisome rashes, moles or lesions  EYES: NEGATIVE for vision changes or irritation  ENT/MOUTH: NEGATIVE for ear, mouth and throat problems  RESP: NEGATIVE for significant cough or SOB  CV: NEGATIVE for chest pain, palpitations or peripheral edema  GI: NEGATIVE for nausea, abdominal pain, heartburn, or change in bowel habits  : NEGATIVE for frequency, dysuria, or hematuria  MUSCULOSKELETAL: NEGATIVE for significant arthralgias or myalgia  NEURO: NEGATIVE for weakness, dizziness or paresthesias  ENDOCRINE: NEGATIVE for temperature intolerance, skin/hair changes  HEME: NEGATIVE for bleeding problems  PSYCHIATRIC: NEGATIVE for changes in mood or affect    Objective    Reported vitals:  [facility] 12/11/2011      healthy, alert and no distress  Psych: Alert and oriented times 3; coherent speech, normal   rate and volume, able to articulate logical thoughts, able   to abstract reason, no tangential thoughts, no hallucinations   or delusions  Her affect is normal     Diagnostic Test Results:  Labs reviewed in Epic    Assessment/Plan:      ICD-10-CM    1. MS (multiple sclerosis) (H)  G35 *UA reflex to Microscopic and Culture ([facility] (except [place] and [place])     Urine Culture Aerobic Bacterial     Comprehensive metabolic panel     CBC with platelets     UA reflex to Microscopic and Culture     Albumin Random Urine Quantitative with Creat Ratio     Drug  Screen Comprehensive , Urine with Reported Meds ([facility]) (Pain Care Package)   2. Spinal stenosis of lumbar region, unspecified whether neurogenic claudication present  M48.061 Drug  Screen Comprehensive , Urine with Reported Meds ([facility]) (Pain Care Package)    3. Lumbar radiculopathy  M54.16 Drug  Screen Comprehensive , Urine with Reported Meds ([facility]) (Pain Care Package)   4. Chronic pain syndrome  G89.4 Drug  Screen Comprehensive , Urine with Reported Meds ([facility]) (Pain Care Package)   5. Dysuria  R30.0 *UA reflex to Microscopic and Culture ([facility] [facility] (except [place] and [place])     Urine Culture Aerobic Bacterial     UA reflex to Microscopic and Culture     Albumin Random Urine Quantitative with Creat Ratio   6. Type 2 diabetes mellitus with stage 1 chronic kidney disease, without long-term current use of insulin (H)  E11.22 Comprehensive metabolic panel    N18.1 Lipid panel reflex to direct LDL Fasting     TSH with free T4 reflex     UA reflex to Microscopic and Culture     Albumin Random Urine Quantitative with Creat Ratio     Hemoglobin A1c   7. Type 2 diabetes, HbA1c goal < 7% (H)  E11.9 Comprehensive metabolic panel     Lipid panel reflex to direct LDL Fasting     TSH with free T4 reflex     UA reflex to Microscopic and Culture     Albumin Random Urine Quantitative with Creat Ratio     Hemoglobin A1c   8. CKD (chronic kidney disease) stage 1, GFR 90 ml/min or greater  N18.1 Comprehensive metabolic panel     Lipid panel reflex to direct LDL Fasting     CBC with platelets     TSH with free T4 reflex     UA reflex to Microscopic and Culture     Albumin Random Urine Quantitative with Creat Ratio     Hemoglobin A1c   9. Hypertension goal BP (blood pressure) < 140/90  I10 Comprehensive metabolic panel     Lipid panel reflex to direct LDL Fasting     UA reflex to Microscopic and Culture     Albumin Random Urine Quantitative with Creat Ratio   10. Hyperlipidemia LDL goal <70  E78.5 Comprehensive metabolic panel     Lipid panel reflex to direct LDL Fasting     CK total   11. T-cell lymphoma (H)  C85.90 Comprehensive metabolic panel     CBC with platelets   12. 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 Moderate depressive episode (H)  F32.1 CK total   14. Medication monitoring encounter  Z51.81 Comprehensive metabolic panel     Lipid panel reflex to direct LDL Fasting     CBC with platelets     CK total     TSH with free T4 reflex     UA reflex to Microscopic and Culture     Albumin Random Urine Quantitative with Creat Ratio     Fecal colorectal cancer screen FIT     Hemoglobin A1c     Drug  Screen Comprehensive , Urine with Reported Meds ([facility]) (Pain Care Package)     Vitamin D Deficiency   15. Controlled substance agreement signed  Z79.899    16. Other long term (current) drug therapy   Z79.899 Vitamin D Deficiency     Med refills prn  Labs fasting soon  CSA renewed    No follow-ups on file.    Phone call duration:  24 minutes    The patient has been notified of following:     \"This telephone visit will be conducted via a call between you and your physician/provider. We have found that certain health care needs can be provided without the need for a physical exam.  This service lets us provide the care you need with a short phone conversation.  If a prescription is necessary we can send it directly to your pharmacy.  If lab work is needed we can place an order for that and you can then stop by our lab to have the test done at a later time.    Telephone visits are billed at different rates depending on your insurance coverage. During this emergency period, for some insurers they may be billed the same as an in-person visit.  Please reach out to your insurance provider with any questions.    If during the course of the call the physician/provider feels a telephone visit is not appropriate, you will not be charged for this service.\"    Patient has given verbal consent for Telephone visit?  Yes    How would you like to obtain your AVS? Talita Hassan MD, FAAFP     [facility]  [address] [place] [address] " [address]  [email or web address]  [email or web address]   Office: [phone]  Fax: [phone]  Pager: [phone]

## 2020-11-23 NOTE — PROGRESS NOTES
CSA done with patient via phone.   Letter created - verbal received from patient.   Writer signed as witness.   PCP signed.     Sent to scan.       Ivanna Kelly RN  [facility]

## 2020-11-24 DIAGNOSIS — R30.0 DYSURIA: ICD-10-CM

## 2020-11-24 DIAGNOSIS — N18.1 TYPE 2 DIABETES MELLITUS WITH STAGE 1 CHRONIC KIDNEY DISEASE, WITHOUT LONG-TERM CURRENT USE OF INSULIN (H): ICD-10-CM

## 2020-11-24 DIAGNOSIS — G89.4 CHRONIC PAIN SYNDROME: ICD-10-CM

## 2020-11-24 DIAGNOSIS — Z51.81 MEDICATION MONITORING ENCOUNTER: ICD-10-CM

## 2020-11-24 DIAGNOSIS — C85.81 OTHER SPECIFIED TYPE OF NON-HODGKIN LYMPHOMA OF HEAD (H): ICD-10-CM

## 2020-11-24 DIAGNOSIS — M54.16 LUMBAR RADICULOPATHY: ICD-10-CM

## 2020-11-24 DIAGNOSIS — N18.1 CKD (CHRONIC KIDNEY DISEASE) STAGE 1, GFR 90 ML/MIN OR GREATER: ICD-10-CM

## 2020-11-24 DIAGNOSIS — E11.9 TYPE 2 DIABETES, HBA1C GOAL < 7% (H): ICD-10-CM

## 2020-11-24 DIAGNOSIS — M48.061 SPINAL STENOSIS OF LUMBAR REGION, UNSPECIFIED WHETHER NEUROGENIC CLAUDICATION PRESENT: ICD-10-CM

## 2020-11-24 DIAGNOSIS — I10 HYPERTENSION GOAL BP (BLOOD PRESSURE) < 140/90: ICD-10-CM

## 2020-11-24 DIAGNOSIS — E11.22 TYPE 2 DIABETES MELLITUS WITH STAGE 1 CHRONIC KIDNEY DISEASE, WITHOUT LONG-TERM CURRENT USE OF INSULIN (H): ICD-10-CM

## 2020-11-24 DIAGNOSIS — G35 MS (MULTIPLE SCLEROSIS) (H): ICD-10-CM

## 2020-11-24 LAB
ALBUMIN UR-MCNC: >=300 MG/DL
AMORPH CRY #/AREA URNS HPF: ABNORMAL /HPF
APPEARANCE UR: ABNORMAL
BACTERIA #/AREA URNS HPF: ABNORMAL /HPF
BILIRUB UR QL STRIP: NEGATIVE
COLOR UR AUTO: YELLOW
GLUCOSE UR STRIP-MCNC: NEGATIVE MG/DL
HGB UR QL STRIP: ABNORMAL
KETONES UR STRIP-MCNC: NEGATIVE MG/DL
LEUKOCYTE ESTERASE UR QL STRIP: ABNORMAL
NITRATE UR QL: NEGATIVE
NON-SQ EPI CELLS #/AREA URNS LPF: ABNORMAL /LPF
PH UR STRIP: 7 PH (ref 5–7)
RBC #/AREA URNS AUTO: ABNORMAL /HPF
SOURCE: ABNORMAL
SP GR UR STRIP: 1.02 (ref 1–1.03)
TRI-PHOS CRY #/AREA URNS HPF: ABNORMAL /HPF
UROBILINOGEN UR STRIP-ACNC: 1 EU/DL (ref 0.2–1)
WBC #/AREA URNS AUTO: ABNORMAL /HPF

## 2020-11-24 PROCEDURE — 87086 URINE CULTURE/COLONY COUNT: CPT | Performed by: FAMILY MEDICINE

## 2020-11-24 PROCEDURE — 80307 DRUG TEST PRSMV CHEM ANLYZR: CPT | Mod: 90 | Performed by: FAMILY MEDICINE

## 2020-11-24 PROCEDURE — 99000 SPECIMEN HANDLING OFFICE-LAB: CPT | Performed by: FAMILY MEDICINE

## 2020-11-24 PROCEDURE — 81001 URINALYSIS AUTO W/SCOPE: CPT | Mod: 59 | Performed by: FAMILY MEDICINE

## 2020-11-24 PROCEDURE — 82043 UR ALBUMIN QUANTITATIVE: CPT | Performed by: FAMILY MEDICINE

## 2020-11-25 LAB
BACTERIA SPEC CULT: NORMAL
CREAT UR-MCNC: 90 MG/DL
Lab: NORMAL
MICROALBUMIN UR-MCNC: 1400 MG/L
MICROALBUMIN/CREAT UR: 1560.76 MG/G CR (ref 0–25)
SPECIMEN SOURCE: NORMAL

## 2020-11-29 LAB — PAIN DRUG SCR UR W RPTD MEDS: NORMAL

## 2020-11-30 ENCOUNTER — TELEPHONE (OUTPATIENT)
Dept: FAMILY MEDICINE | Facility: CLINIC | Age: 55
End: 2020-11-30

## 2020-11-30 DIAGNOSIS — R80.9 PROTEINURIA: Primary | ICD-10-CM

## 2020-11-30 NOTE — TELEPHONE ENCOUNTER
Patient given 11/24/20 lab result message. Patient agrees with advisement for nephrology consult.   Requests that consult information be sent to her through [employer].  Referral has not been ordered.   Fozia Arnold RN

## 2020-12-14 ENCOUNTER — HEALTH MAINTENANCE LETTER (OUTPATIENT)
Age: 55
End: 2020-12-14

## 2020-12-17 DIAGNOSIS — G35 MS (MULTIPLE SCLEROSIS) (H): ICD-10-CM

## 2020-12-17 DIAGNOSIS — G89.4 CHRONIC PAIN SYNDROME: ICD-10-CM

## 2020-12-17 NOTE — TELEPHONE ENCOUNTER
Reason for Call:  Medication or medication refill:    Do you use a [facility]?  Name of the pharmacy and phone number for the current request:  [facility]    Name of the medication requested:   oxyCODONE IR (ROXICODONE) 15 MG tablet         Other request: Caller stated that she is out and forgot to mention it.     Can we leave a detailed message on this number? YES    Phone number patient can be reached at: Home number on file [phone] (home)    Best Time: anytime    Call taken on 12/17/2020 at 3:20 PM by Stefano Walters LPN

## 2020-12-18 RX ORDER — DULOXETIN HYDROCHLORIDE 60 MG/1
60 CAPSULE, DELAYED RELEASE ORAL 2 TIMES DAILY
Qty: 180 CAPSULE | Refills: 3 | Status: SHIPPED | OUTPATIENT
Start: 2020-12-18 | End: 2021-01-01

## 2020-12-18 RX ORDER — OXYCODONE HYDROCHLORIDE 15 MG/1
15 TABLET ORAL EVERY 6 HOURS PRN
Qty: 120 TABLET | Refills: 0 | Status: SHIPPED | OUTPATIENT
Start: 2020-12-19 | End: 2021-01-16

## 2020-12-18 NOTE — TELEPHONE ENCOUNTER
Pt called to check status of this request, please contact if there are further questions/concerns.    Gregoria Abel on 12/18/2020 at 10:17 AM

## 2020-12-18 NOTE — TELEPHONE ENCOUNTER
Pt called regarding previous phone call about her refills. Spoke with Dr. Hassan and he did approve her refills. Called and gave information to patient.    Jana SUTHERLAND

## 2020-12-19 NOTE — TELEPHONE ENCOUNTER
rx done, recent cpx, please do phq/delano    Visit every 3 months ok for VV    PHQ 6/24/2019 1/29/2020 8/21/2020   PHQ-9 Total Score 4 6 3   Q9: Thoughts of better off dead/self-harm past 2 weeks Not at all Not at all Not at all     DELANO-7 SCORE 6/24/2019 1/29/2020 8/21/2020   Total Score 0 1 0

## 2020-12-23 ASSESSMENT — PATIENT HEALTH QUESTIONNAIRE - PHQ9
SUM OF ALL RESPONSES TO PHQ QUESTIONS 1-9: 3
5. POOR APPETITE OR OVEREATING: NOT AT ALL

## 2020-12-23 ASSESSMENT — ANXIETY QUESTIONNAIRES
7. FEELING AFRAID AS IF SOMETHING AWFUL MIGHT HAPPEN: NOT AT ALL
5. BEING SO RESTLESS THAT IT IS HARD TO SIT STILL: NOT AT ALL
6. BECOMING EASILY ANNOYED OR IRRITABLE: NOT AT ALL
IF YOU CHECKED OFF ANY PROBLEMS ON THIS QUESTIONNAIRE, HOW DIFFICULT HAVE THESE PROBLEMS MADE IT FOR YOU TO DO YOUR WORK, TAKE CARE OF THINGS AT HOME, OR GET ALONG WITH OTHER PEOPLE: NOT DIFFICULT AT ALL
2. NOT BEING ABLE TO STOP OR CONTROL WORRYING: NOT AT ALL
1. FEELING NERVOUS, ANXIOUS, OR ON EDGE: NOT AT ALL
3. WORRYING TOO MUCH ABOUT DIFFERENT THINGS: NOT AT ALL
GAD7 TOTAL SCORE: 0

## 2020-12-24 ASSESSMENT — ANXIETY QUESTIONNAIRES: GAD7 TOTAL SCORE: 0

## 2021-01-01 ENCOUNTER — OFFICE VISIT (OUTPATIENT)
Dept: FAMILY MEDICINE | Facility: CLINIC | Age: 56
End: 2021-01-01
Payer: COMMERCIAL

## 2021-01-01 ENCOUNTER — APPOINTMENT (OUTPATIENT)
Dept: SPEECH THERAPY | Facility: CLINIC | Age: 56
DRG: 853 | End: 2021-01-01
Attending: INTERNAL MEDICINE
Payer: COMMERCIAL

## 2021-01-01 ENCOUNTER — APPOINTMENT (OUTPATIENT)
Dept: CT IMAGING | Facility: CLINIC | Age: 56
DRG: 853 | End: 2021-01-01
Attending: EMERGENCY MEDICINE
Payer: COMMERCIAL

## 2021-01-01 ENCOUNTER — MYC REFILL (OUTPATIENT)
Dept: FAMILY MEDICINE | Facility: CLINIC | Age: 56
End: 2021-01-01

## 2021-01-01 ENCOUNTER — HEALTH MAINTENANCE LETTER (OUTPATIENT)
Age: 56
End: 2021-01-01

## 2021-01-01 ENCOUNTER — VIRTUAL VISIT (OUTPATIENT)
Dept: FAMILY MEDICINE | Facility: CLINIC | Age: 56
End: 2021-01-01
Payer: COMMERCIAL

## 2021-01-01 ENCOUNTER — E-VISIT (OUTPATIENT)
Dept: FAMILY MEDICINE | Facility: CLINIC | Age: 56
End: 2021-01-01
Payer: COMMERCIAL

## 2021-01-01 ENCOUNTER — APPOINTMENT (OUTPATIENT)
Dept: GENERAL RADIOLOGY | Facility: CLINIC | Age: 56
DRG: 853 | End: 2021-01-01
Attending: INTERNAL MEDICINE
Payer: COMMERCIAL

## 2021-01-01 ENCOUNTER — TELEPHONE (OUTPATIENT)
Dept: FAMILY MEDICINE | Facility: CLINIC | Age: 56
End: 2021-01-01

## 2021-01-01 ENCOUNTER — MYC MEDICAL ADVICE (OUTPATIENT)
Dept: FAMILY MEDICINE | Facility: CLINIC | Age: 56
End: 2021-01-01
Payer: COMMERCIAL

## 2021-01-01 ENCOUNTER — MYC REFILL (OUTPATIENT)
Dept: FAMILY MEDICINE | Facility: CLINIC | Age: 56
End: 2021-01-01
Payer: COMMERCIAL

## 2021-01-01 ENCOUNTER — HOSPITAL ENCOUNTER (INPATIENT)
Facility: CLINIC | Age: 56
LOS: 29 days | DRG: 853 | End: 2022-01-22
Attending: EMERGENCY MEDICINE | Admitting: SURGERY
Payer: COMMERCIAL

## 2021-01-01 ENCOUNTER — APPOINTMENT (OUTPATIENT)
Dept: GENERAL RADIOLOGY | Facility: CLINIC | Age: 56
DRG: 853 | End: 2021-01-01
Attending: SURGERY
Payer: COMMERCIAL

## 2021-01-01 ENCOUNTER — APPOINTMENT (OUTPATIENT)
Dept: GENERAL RADIOLOGY | Facility: CLINIC | Age: 56
DRG: 853 | End: 2021-01-01
Attending: EMERGENCY MEDICINE
Payer: COMMERCIAL

## 2021-01-01 ENCOUNTER — TELEPHONE (OUTPATIENT)
Dept: FAMILY MEDICINE | Facility: CLINIC | Age: 56
End: 2021-01-01
Payer: COMMERCIAL

## 2021-01-01 VITALS
SYSTOLIC BLOOD PRESSURE: 122 MMHG | TEMPERATURE: 98.4 F | DIASTOLIC BLOOD PRESSURE: 72 MMHG | OXYGEN SATURATION: 91 % | HEART RATE: 96 BPM | RESPIRATION RATE: 20 BRPM

## 2021-01-01 DIAGNOSIS — G35 MS (MULTIPLE SCLEROSIS) (H): ICD-10-CM

## 2021-01-01 DIAGNOSIS — N18.1 CKD (CHRONIC KIDNEY DISEASE) STAGE 1, GFR 90 ML/MIN OR GREATER: ICD-10-CM

## 2021-01-01 DIAGNOSIS — N18.1 TYPE 2 DIABETES MELLITUS WITH STAGE 1 CHRONIC KIDNEY DISEASE, WITHOUT LONG-TERM CURRENT USE OF INSULIN (H): Primary | ICD-10-CM

## 2021-01-01 DIAGNOSIS — E78.5 HYPERLIPIDEMIA LDL GOAL <70: ICD-10-CM

## 2021-01-01 DIAGNOSIS — Z51.81 MEDICATION MONITORING ENCOUNTER: ICD-10-CM

## 2021-01-01 DIAGNOSIS — C85.81 OTHER SPECIFIED TYPE OF NON-HODGKIN LYMPHOMA OF HEAD (H): ICD-10-CM

## 2021-01-01 DIAGNOSIS — E11.22 TYPE 2 DIABETES MELLITUS WITH STAGE 1 CHRONIC KIDNEY DISEASE, WITHOUT LONG-TERM CURRENT USE OF INSULIN (H): ICD-10-CM

## 2021-01-01 DIAGNOSIS — E66.01 MORBID OBESITY (H): ICD-10-CM

## 2021-01-01 DIAGNOSIS — G89.4 CHRONIC PAIN SYNDROME: ICD-10-CM

## 2021-01-01 DIAGNOSIS — R34 ANURIA: ICD-10-CM

## 2021-01-01 DIAGNOSIS — Z79.899 OTHER LONG TERM (CURRENT) DRUG THERAPY: ICD-10-CM

## 2021-01-01 DIAGNOSIS — Z79.899 ENCOUNTER FOR LONG-TERM CURRENT USE OF MEDICATION: ICD-10-CM

## 2021-01-01 DIAGNOSIS — N18.1 TYPE 2 DIABETES MELLITUS WITH STAGE 1 CHRONIC KIDNEY DISEASE, WITHOUT LONG-TERM CURRENT USE OF INSULIN (H): ICD-10-CM

## 2021-01-01 DIAGNOSIS — R80.8 OTHER PROTEINURIA: ICD-10-CM

## 2021-01-01 DIAGNOSIS — C85.90 T-CELL LYMPHOMA (H): ICD-10-CM

## 2021-01-01 DIAGNOSIS — E11.9 TYPE 2 DIABETES, HBA1C GOAL < 7% (H): ICD-10-CM

## 2021-01-01 DIAGNOSIS — Z02.89 PAIN MEDICATION AGREEMENT: ICD-10-CM

## 2021-01-01 DIAGNOSIS — Z79.899 CONTROLLED SUBSTANCE AGREEMENT SIGNED: ICD-10-CM

## 2021-01-01 DIAGNOSIS — M54.16 LUMBAR RADICULOPATHY: ICD-10-CM

## 2021-01-01 DIAGNOSIS — N39.0 URINARY TRACT INFECTION: ICD-10-CM

## 2021-01-01 DIAGNOSIS — Z78.0 ASYMPTOMATIC MENOPAUSAL STATE: ICD-10-CM

## 2021-01-01 DIAGNOSIS — I89.0 LYMPHEDEMA: ICD-10-CM

## 2021-01-01 DIAGNOSIS — Z13.820 SCREENING FOR OSTEOPOROSIS: ICD-10-CM

## 2021-01-01 DIAGNOSIS — I10 HYPERTENSION GOAL BP (BLOOD PRESSURE) < 140/90: ICD-10-CM

## 2021-01-01 DIAGNOSIS — Z12.31 ENCOUNTER FOR SCREENING MAMMOGRAM FOR MALIGNANT NEOPLASM OF BREAST: ICD-10-CM

## 2021-01-01 DIAGNOSIS — J96.01 ACUTE HYPOXEMIC RESPIRATORY FAILURE (H): ICD-10-CM

## 2021-01-01 DIAGNOSIS — E87.5 HYPERKALEMIA: ICD-10-CM

## 2021-01-01 DIAGNOSIS — R89.9 ABNORMAL LABORATORY TEST RESULT: ICD-10-CM

## 2021-01-01 DIAGNOSIS — D64.9 ANEMIA, UNSPECIFIED: ICD-10-CM

## 2021-01-01 DIAGNOSIS — G89.4 CHRONIC PAIN SYNDROME: Chronic | ICD-10-CM

## 2021-01-01 DIAGNOSIS — Z87.81 S/P RIGHT HIP FRACTURE: ICD-10-CM

## 2021-01-01 DIAGNOSIS — G93.40 ENCEPHALOPATHY: ICD-10-CM

## 2021-01-01 DIAGNOSIS — E11.22 TYPE 2 DIABETES MELLITUS WITH STAGE 1 CHRONIC KIDNEY DISEASE, WITHOUT LONG-TERM CURRENT USE OF INSULIN (H): Primary | ICD-10-CM

## 2021-01-01 DIAGNOSIS — L89.899 DECUBITUS ULCER OF LEFT LEG: ICD-10-CM

## 2021-01-01 DIAGNOSIS — F32.A MODERATE DEPRESSIVE EPISODE: ICD-10-CM

## 2021-01-01 DIAGNOSIS — J01.90 ACUTE BACTERIAL SINUSITIS: Primary | ICD-10-CM

## 2021-01-01 DIAGNOSIS — J01.90 ACUTE SINUSITIS WITH SYMPTOMS > 10 DAYS: ICD-10-CM

## 2021-01-01 DIAGNOSIS — G35 MS (MULTIPLE SCLEROSIS) (H): Primary | ICD-10-CM

## 2021-01-01 DIAGNOSIS — Z12.11 SCREEN FOR COLON CANCER: ICD-10-CM

## 2021-01-01 DIAGNOSIS — N17.9 ACUTE RENAL FAILURE, UNSPECIFIED ACUTE RENAL FAILURE TYPE (H): ICD-10-CM

## 2021-01-01 DIAGNOSIS — B96.89 ACUTE BACTERIAL SINUSITIS: Primary | ICD-10-CM

## 2021-01-01 DIAGNOSIS — K63.5 POLYP OF COLON, UNSPECIFIED PART OF COLON, UNSPECIFIED TYPE: ICD-10-CM

## 2021-01-01 DIAGNOSIS — Z00.00 ROUTINE GENERAL MEDICAL EXAMINATION AT A HEALTH CARE FACILITY: Primary | ICD-10-CM

## 2021-01-01 DIAGNOSIS — N12 EMPHYSEMATOUS PYELITIS: ICD-10-CM

## 2021-01-01 DIAGNOSIS — M48.061 SPINAL STENOSIS OF LUMBAR REGION, UNSPECIFIED WHETHER NEUROGENIC CLAUDICATION PRESENT: ICD-10-CM

## 2021-01-01 DIAGNOSIS — A41.9 SEPTIC SHOCK (H): ICD-10-CM

## 2021-01-01 DIAGNOSIS — R80.9 PROTEINURIA: ICD-10-CM

## 2021-01-01 DIAGNOSIS — R65.21 SEPTIC SHOCK (H): ICD-10-CM

## 2021-01-01 DIAGNOSIS — I21.4 NSTEMI (NON-ST ELEVATED MYOCARDIAL INFARCTION) (H): ICD-10-CM

## 2021-01-01 DIAGNOSIS — M62.838 MUSCLE SPASM: ICD-10-CM

## 2021-01-01 DIAGNOSIS — R79.1 ABNORMAL BLOOD COAGULATION PROFILE: ICD-10-CM

## 2021-01-01 DIAGNOSIS — K72.00 SHOCK LIVER: ICD-10-CM

## 2021-01-01 DIAGNOSIS — R80.9 PROTEINURIA, UNSPECIFIED TYPE: ICD-10-CM

## 2021-01-01 LAB
ABO/RH(D): NORMAL
ALBUMIN SERPL-MCNC: 1.6 G/DL (ref 3.4–5)
ALBUMIN SERPL-MCNC: 1.7 G/DL (ref 3.4–5)
ALBUMIN SERPL-MCNC: 1.8 G/DL (ref 3.4–5)
ALBUMIN SERPL-MCNC: 1.9 G/DL (ref 3.4–5)
ALBUMIN SERPL-MCNC: 2.2 G/DL (ref 3.4–5)
ALBUMIN SERPL-MCNC: 2.4 G/DL (ref 3.4–5)
ALBUMIN SERPL-MCNC: 2.6 G/DL (ref 3.4–5)
ALBUMIN UR-MCNC: 300 MG/DL
ALBUMIN UR-MCNC: >=300 MG/DL
ALP SERPL-CCNC: 120 U/L (ref 40–150)
ALP SERPL-CCNC: 137 U/L (ref 40–150)
ALP SERPL-CCNC: 147 U/L (ref 40–150)
ALP SERPL-CCNC: 152 U/L (ref 40–150)
ALP SERPL-CCNC: 163 U/L (ref 40–150)
ALP SERPL-CCNC: 166 U/L (ref 40–150)
ALP SERPL-CCNC: 174 U/L (ref 40–150)
ALP SERPL-CCNC: 218 U/L (ref 40–150)
ALP SERPL-CCNC: 220 U/L (ref 40–150)
ALP SERPL-CCNC: 242 U/L (ref 40–150)
ALP SERPL-CCNC: 246 U/L (ref 40–150)
ALT SERPL W P-5'-P-CCNC: 1356 U/L (ref 0–50)
ALT SERPL W P-5'-P-CCNC: 1962 U/L (ref 0–50)
ALT SERPL W P-5'-P-CCNC: 26 U/L (ref 0–50)
ALT SERPL W P-5'-P-CCNC: 3186 U/L (ref 0–50)
ALT SERPL W P-5'-P-CCNC: 347 U/L (ref 0–50)
ALT SERPL W P-5'-P-CCNC: 3596 U/L (ref 0–50)
ALT SERPL W P-5'-P-CCNC: 4353 U/L (ref 0–50)
ALT SERPL W P-5'-P-CCNC: 452 U/L (ref 0–50)
ALT SERPL W P-5'-P-CCNC: 625 U/L (ref 0–50)
ALT SERPL W P-5'-P-CCNC: 970 U/L (ref 0–50)
ALT SERPL W P-5'-P-CCNC: >1000 U/L (ref 0–50)
AMMONIA PLAS-SCNC: 49 UMOL/L (ref 10–50)
AMPHETAMINES UR QL SCN: NORMAL
ANION GAP SERPL CALCULATED.3IONS-SCNC: 1 MMOL/L (ref 3–14)
ANION GAP SERPL CALCULATED.3IONS-SCNC: 10 MMOL/L (ref 3–14)
ANION GAP SERPL CALCULATED.3IONS-SCNC: 12 MMOL/L (ref 3–14)
ANION GAP SERPL CALCULATED.3IONS-SCNC: 13 MMOL/L (ref 3–14)
ANION GAP SERPL CALCULATED.3IONS-SCNC: 2 MMOL/L (ref 3–14)
ANION GAP SERPL CALCULATED.3IONS-SCNC: 3 MMOL/L (ref 3–14)
ANION GAP SERPL CALCULATED.3IONS-SCNC: 5 MMOL/L (ref 3–14)
ANION GAP SERPL CALCULATED.3IONS-SCNC: 6 MMOL/L (ref 3–14)
ANION GAP SERPL CALCULATED.3IONS-SCNC: 8 MMOL/L (ref 3–14)
ANTIBODY SCREEN: NEGATIVE
APAP SERPL-MCNC: 2 MG/L (ref 10–30)
APAP SERPL-MCNC: <2 MG/L (ref 10–30)
APPEARANCE UR: ABNORMAL
APPEARANCE UR: ABNORMAL
APTT PPP: 32 SECONDS (ref 22–38)
AST SERPL W P-5'-P-CCNC: 110 U/L (ref 0–45)
AST SERPL W P-5'-P-CCNC: 12 U/L (ref 0–45)
AST SERPL W P-5'-P-CCNC: 1315 U/L (ref 0–45)
AST SERPL W P-5'-P-CCNC: 188 U/L (ref 0–45)
AST SERPL W P-5'-P-CCNC: 2283 U/L (ref 0–45)
AST SERPL W P-5'-P-CCNC: 3722 U/L (ref 0–45)
AST SERPL W P-5'-P-CCNC: 3722 U/L (ref 0–45)
AST SERPL W P-5'-P-CCNC: 427 U/L (ref 0–45)
AST SERPL W P-5'-P-CCNC: 47 U/L (ref 0–45)
AST SERPL W P-5'-P-CCNC: 49 U/L (ref 0–45)
AST SERPL W P-5'-P-CCNC: 60 U/L (ref 0–45)
ATRIAL RATE - MUSE: 141 BPM
ATRIAL RATE - MUSE: 61 BPM
ATRIAL RATE - MUSE: 98 BPM
BACLOFEN SERPL-MCNC: <20 NG/ML
BACTERIA #/AREA URNS HPF: ABNORMAL /HPF
BACTERIA BLD CULT: ABNORMAL
BACTERIA BLD CULT: NO GROWTH
BACTERIA BLD CULT: NO GROWTH
BACTERIA UR CULT: ABNORMAL
BARBITURATES UR QL: NORMAL
BASE EXCESS BLDA CALC-SCNC: -0.3 MMOL/L (ref -9–1.8)
BASE EXCESS BLDA CALC-SCNC: -10.6 MMOL/L (ref -9–1.8)
BASE EXCESS BLDA CALC-SCNC: 2.4 MMOL/L (ref -9–1.8)
BASE EXCESS BLDA CALC-SCNC: 5.4 MMOL/L (ref -9–1.8)
BASE EXCESS BLDA CALC-SCNC: 5.8 MMOL/L (ref -9–1.8)
BASE EXCESS BLDA CALC-SCNC: 8.8 MMOL/L (ref -9–1.8)
BASE EXCESS BLDA CALC-SCNC: 9.3 MMOL/L (ref -9–1.8)
BASE EXCESS BLDV CALC-SCNC: 5.3 MMOL/L (ref -7.7–1.9)
BASOPHILS # BLD AUTO: 0.2 10E3/UL (ref 0–0.2)
BASOPHILS NFR BLD AUTO: 1 %
BENZODIAZ UR QL: NORMAL
BILIRUB SERPL-MCNC: 0.4 MG/DL (ref 0.2–1.3)
BILIRUB SERPL-MCNC: 1.2 MG/DL (ref 0.2–1.3)
BILIRUB SERPL-MCNC: 1.3 MG/DL (ref 0.2–1.3)
BILIRUB SERPL-MCNC: 1.3 MG/DL (ref 0.2–1.3)
BILIRUB SERPL-MCNC: 1.4 MG/DL (ref 0.2–1.3)
BILIRUB SERPL-MCNC: 1.5 MG/DL (ref 0.2–1.3)
BILIRUB SERPL-MCNC: 2.1 MG/DL (ref 0.2–1.3)
BILIRUB UR QL STRIP: NEGATIVE
BILIRUB UR QL STRIP: NEGATIVE
BUN SERPL-MCNC: 16 MG/DL (ref 7–30)
BUN SERPL-MCNC: 26 MG/DL (ref 7–30)
BUN SERPL-MCNC: 35 MG/DL (ref 7–30)
BUN SERPL-MCNC: 44 MG/DL (ref 7–30)
BUN SERPL-MCNC: 47 MG/DL (ref 7–30)
BUN SERPL-MCNC: 54 MG/DL (ref 7–30)
BUN SERPL-MCNC: 60 MG/DL (ref 7–30)
BUN SERPL-MCNC: 73 MG/DL (ref 7–30)
BUN SERPL-MCNC: 75 MG/DL (ref 7–30)
BUN SERPL-MCNC: 76 MG/DL (ref 7–30)
BUN SERPL-MCNC: 77 MG/DL (ref 7–30)
CA-I BLD-MCNC: 3.2 MG/DL (ref 4.4–5.2)
CA-I BLD-MCNC: 3.5 MG/DL (ref 4.4–5.2)
CA-I BLD-MCNC: 3.6 MG/DL (ref 4.4–5.2)
CA-I BLD-MCNC: 4.4 MG/DL (ref 4.4–5.2)
CA-I BLD-MCNC: 4.9 MG/DL (ref 4.4–5.2)
CALCIUM SERPL-MCNC: 6.7 MG/DL (ref 8.5–10.1)
CALCIUM SERPL-MCNC: 6.8 MG/DL (ref 8.5–10.1)
CALCIUM SERPL-MCNC: 7.1 MG/DL (ref 8.5–10.1)
CALCIUM SERPL-MCNC: 7.4 MG/DL (ref 8.5–10.1)
CALCIUM SERPL-MCNC: 7.6 MG/DL (ref 8.5–10.1)
CALCIUM SERPL-MCNC: 7.8 MG/DL (ref 8.5–10.1)
CALCIUM SERPL-MCNC: 7.8 MG/DL (ref 8.5–10.1)
CALCIUM SERPL-MCNC: 8 MG/DL (ref 8.5–10.1)
CALCIUM SERPL-MCNC: 8 MG/DL (ref 8.5–10.1)
CALCIUM SERPL-MCNC: 8.6 MG/DL (ref 8.5–10.1)
CALCIUM SERPL-MCNC: 8.9 MG/DL (ref 8.5–10.1)
CANNABINOIDS UR QL SCN: NORMAL
CHLORIDE BLD-SCNC: 101 MMOL/L (ref 94–109)
CHLORIDE BLD-SCNC: 109 MMOL/L (ref 94–109)
CHLORIDE BLD-SCNC: 112 MMOL/L (ref 94–109)
CHLORIDE BLD-SCNC: 114 MMOL/L (ref 94–109)
CHLORIDE BLD-SCNC: 117 MMOL/L (ref 94–109)
CHLORIDE BLD-SCNC: 117 MMOL/L (ref 94–109)
CHLORIDE BLD-SCNC: 119 MMOL/L (ref 94–109)
CHLORIDE BLD-SCNC: 95 MMOL/L (ref 94–109)
CHLORIDE BLD-SCNC: 96 MMOL/L (ref 94–109)
CHLORIDE BLD-SCNC: 99 MMOL/L (ref 94–109)
CHLORIDE BLD-SCNC: 99 MMOL/L (ref 94–109)
CHOLEST SERPL-MCNC: 109 MG/DL
CK SERPL-CCNC: 25 U/L (ref 30–225)
CO2 SERPL-SCNC: 26 MMOL/L (ref 20–32)
CO2 SERPL-SCNC: 27 MMOL/L (ref 20–32)
CO2 SERPL-SCNC: 28 MMOL/L (ref 20–32)
CO2 SERPL-SCNC: 30 MMOL/L (ref 20–32)
CO2 SERPL-SCNC: 31 MMOL/L (ref 20–32)
CO2 SERPL-SCNC: 32 MMOL/L (ref 20–32)
CO2 SERPL-SCNC: 33 MMOL/L (ref 20–32)
CO2 SERPL-SCNC: 34 MMOL/L (ref 20–32)
COCAINE UR QL: NORMAL
COLOR UR AUTO: ABNORMAL
COLOR UR AUTO: ABNORMAL
CPB POCT: NO
CREAT BLD-MCNC: 4 MG/DL (ref 0.5–1)
CREAT SERPL-MCNC: 0.56 MG/DL (ref 0.52–1.04)
CREAT SERPL-MCNC: 0.93 MG/DL (ref 0.52–1.04)
CREAT SERPL-MCNC: 1.02 MG/DL (ref 0.52–1.04)
CREAT SERPL-MCNC: 1.03 MG/DL (ref 0.52–1.04)
CREAT SERPL-MCNC: 1.07 MG/DL (ref 0.52–1.04)
CREAT SERPL-MCNC: 1.19 MG/DL (ref 0.52–1.04)
CREAT SERPL-MCNC: 1.36 MG/DL (ref 0.52–1.04)
CREAT SERPL-MCNC: 2.15 MG/DL (ref 0.52–1.04)
CREAT SERPL-MCNC: 2.69 MG/DL (ref 0.52–1.04)
CREAT SERPL-MCNC: 3.14 MG/DL (ref 0.52–1.04)
CREAT SERPL-MCNC: 3.5 MG/DL (ref 0.52–1.04)
CRP SERPL-MCNC: 152 MG/L (ref 0–8)
CRP SERPL-MCNC: 159 MG/L (ref 0–8)
CRP SERPL-MCNC: 41 MG/L (ref 0–8)
CRP SERPL-MCNC: 82.6 MG/L (ref 0–8)
DEPRECATED CALCIDIOL+CALCIFEROL SERPL-MC: 46 UG/L (ref 20–75)
DIASTOLIC BLOOD PRESSURE - MUSE: NORMAL MMHG
ENTEROCOCCUS FAECALIS: DETECTED
ENTEROCOCCUS FAECALIS: NOT DETECTED
ENTEROCOCCUS FAECIUM: NOT DETECTED
ENTEROCOCCUS FAECIUM: NOT DETECTED
EOSINOPHIL # BLD AUTO: 0 10E3/UL (ref 0–0.7)
EOSINOPHIL NFR BLD AUTO: 0 %
ERYTHROCYTE [DISTWIDTH] IN BLOOD BY AUTOMATED COUNT: 19.9 % (ref 10–15)
ERYTHROCYTE [DISTWIDTH] IN BLOOD BY AUTOMATED COUNT: 20 % (ref 10–15)
ERYTHROCYTE [DISTWIDTH] IN BLOOD BY AUTOMATED COUNT: 20.2 % (ref 10–15)
ERYTHROCYTE [DISTWIDTH] IN BLOOD BY AUTOMATED COUNT: 20.3 % (ref 10–15)
ERYTHROCYTE [DISTWIDTH] IN BLOOD BY AUTOMATED COUNT: 20.5 % (ref 10–15)
ERYTHROCYTE [DISTWIDTH] IN BLOOD BY AUTOMATED COUNT: 20.7 % (ref 10–15)
ERYTHROCYTE [DISTWIDTH] IN BLOOD BY AUTOMATED COUNT: 21 % (ref 10–15)
ERYTHROCYTE [DISTWIDTH] IN BLOOD BY AUTOMATED COUNT: 21.1 % (ref 10–15)
ERYTHROCYTE [DISTWIDTH] IN BLOOD BY AUTOMATED COUNT: 21.6 % (ref 10–15)
ERYTHROCYTE [DISTWIDTH] IN BLOOD BY AUTOMATED COUNT: 22 % (ref 10–15)
ERYTHROCYTE [SEDIMENTATION RATE] IN BLOOD BY WESTERGREN METHOD: 14 MM/HR (ref 0–30)
ERYTHROCYTE [SEDIMENTATION RATE] IN BLOOD BY WESTERGREN METHOD: 49 MM/HR (ref 0–30)
ETHANOL SERPL-MCNC: <0.01 G/DL
FASTING STATUS PATIENT QL REPORTED: YES
FLUAV RNA SPEC QL NAA+PROBE: NEGATIVE
FLUBV RNA RESP QL NAA+PROBE: NEGATIVE
GFR SERPL CREATININE-BSD FRML MDRD: 12 ML/MIN/1.73M2
GFR SERPL CREATININE-BSD FRML MDRD: 14 ML/MIN/1.73M2
GFR SERPL CREATININE-BSD FRML MDRD: 16 ML/MIN/1.73M2
GFR SERPL CREATININE-BSD FRML MDRD: 20 ML/MIN/1.73M2
GFR SERPL CREATININE-BSD FRML MDRD: 26 ML/MIN/1.73M2
GFR SERPL CREATININE-BSD FRML MDRD: 45 ML/MIN/1.73M2
GFR SERPL CREATININE-BSD FRML MDRD: 53 ML/MIN/1.73M2
GFR SERPL CREATININE-BSD FRML MDRD: 60 ML/MIN/1.73M2
GFR SERPL CREATININE-BSD FRML MDRD: 63 ML/MIN/1.73M2
GFR SERPL CREATININE-BSD FRML MDRD: 63 ML/MIN/1.73M2
GFR SERPL CREATININE-BSD FRML MDRD: 71 ML/MIN/1.73M2
GFR SERPL CREATININE-BSD FRML MDRD: >90 ML/MIN/1.73M2
GLUCOSE BLD-MCNC: 107 MG/DL (ref 70–99)
GLUCOSE BLD-MCNC: 113 MG/DL (ref 70–99)
GLUCOSE BLD-MCNC: 124 MG/DL (ref 70–99)
GLUCOSE BLD-MCNC: 142 MG/DL (ref 70–99)
GLUCOSE BLD-MCNC: 143 MG/DL (ref 70–99)
GLUCOSE BLD-MCNC: 146 MG/DL (ref 70–99)
GLUCOSE BLD-MCNC: 147 MG/DL (ref 70–99)
GLUCOSE BLD-MCNC: 151 MG/DL (ref 70–99)
GLUCOSE BLD-MCNC: 160 MG/DL (ref 70–99)
GLUCOSE BLD-MCNC: 220 MG/DL (ref 70–99)
GLUCOSE BLD-MCNC: 91 MG/DL (ref 70–99)
GLUCOSE BLDC GLUCOMTR-MCNC: 103 MG/DL (ref 70–99)
GLUCOSE BLDC GLUCOMTR-MCNC: 104 MG/DL (ref 70–99)
GLUCOSE BLDC GLUCOMTR-MCNC: 106 MG/DL (ref 70–99)
GLUCOSE BLDC GLUCOMTR-MCNC: 108 MG/DL (ref 70–99)
GLUCOSE BLDC GLUCOMTR-MCNC: 110 MG/DL (ref 70–99)
GLUCOSE BLDC GLUCOMTR-MCNC: 112 MG/DL (ref 70–99)
GLUCOSE BLDC GLUCOMTR-MCNC: 113 MG/DL (ref 70–99)
GLUCOSE BLDC GLUCOMTR-MCNC: 115 MG/DL (ref 70–99)
GLUCOSE BLDC GLUCOMTR-MCNC: 116 MG/DL (ref 70–99)
GLUCOSE BLDC GLUCOMTR-MCNC: 117 MG/DL (ref 70–99)
GLUCOSE BLDC GLUCOMTR-MCNC: 117 MG/DL (ref 70–99)
GLUCOSE BLDC GLUCOMTR-MCNC: 118 MG/DL (ref 70–99)
GLUCOSE BLDC GLUCOMTR-MCNC: 119 MG/DL (ref 70–99)
GLUCOSE BLDC GLUCOMTR-MCNC: 120 MG/DL (ref 70–99)
GLUCOSE BLDC GLUCOMTR-MCNC: 120 MG/DL (ref 70–99)
GLUCOSE BLDC GLUCOMTR-MCNC: 124 MG/DL (ref 70–99)
GLUCOSE BLDC GLUCOMTR-MCNC: 125 MG/DL (ref 70–99)
GLUCOSE BLDC GLUCOMTR-MCNC: 125 MG/DL (ref 70–99)
GLUCOSE BLDC GLUCOMTR-MCNC: 126 MG/DL (ref 70–99)
GLUCOSE BLDC GLUCOMTR-MCNC: 126 MG/DL (ref 70–99)
GLUCOSE BLDC GLUCOMTR-MCNC: 127 MG/DL (ref 70–99)
GLUCOSE BLDC GLUCOMTR-MCNC: 128 MG/DL (ref 70–99)
GLUCOSE BLDC GLUCOMTR-MCNC: 129 MG/DL (ref 70–99)
GLUCOSE BLDC GLUCOMTR-MCNC: 130 MG/DL (ref 70–99)
GLUCOSE BLDC GLUCOMTR-MCNC: 131 MG/DL (ref 70–99)
GLUCOSE BLDC GLUCOMTR-MCNC: 133 MG/DL (ref 70–99)
GLUCOSE BLDC GLUCOMTR-MCNC: 134 MG/DL (ref 70–99)
GLUCOSE BLDC GLUCOMTR-MCNC: 134 MG/DL (ref 70–99)
GLUCOSE BLDC GLUCOMTR-MCNC: 135 MG/DL (ref 70–99)
GLUCOSE BLDC GLUCOMTR-MCNC: 136 MG/DL (ref 70–99)
GLUCOSE BLDC GLUCOMTR-MCNC: 141 MG/DL (ref 70–99)
GLUCOSE BLDC GLUCOMTR-MCNC: 141 MG/DL (ref 70–99)
GLUCOSE BLDC GLUCOMTR-MCNC: 142 MG/DL (ref 70–99)
GLUCOSE BLDC GLUCOMTR-MCNC: 143 MG/DL (ref 70–99)
GLUCOSE BLDC GLUCOMTR-MCNC: 146 MG/DL (ref 70–99)
GLUCOSE BLDC GLUCOMTR-MCNC: 149 MG/DL (ref 70–99)
GLUCOSE BLDC GLUCOMTR-MCNC: 159 MG/DL (ref 70–99)
GLUCOSE BLDC GLUCOMTR-MCNC: 66 MG/DL (ref 70–99)
GLUCOSE BLDC GLUCOMTR-MCNC: 74 MG/DL (ref 70–99)
GLUCOSE BLDC GLUCOMTR-MCNC: 79 MG/DL (ref 70–99)
GLUCOSE BLDC GLUCOMTR-MCNC: 92 MG/DL (ref 70–99)
GLUCOSE UR STRIP-MCNC: NEGATIVE MG/DL
GLUCOSE UR STRIP-MCNC: NEGATIVE MG/DL
HBA1C MFR BLD: 7.1 % (ref 0–5.6)
HBA1C MFR BLD: 8.5 % (ref 0–5.6)
HCO3 BLD-SCNC: 13 MMOL/L (ref 21–28)
HCO3 BLD-SCNC: 30 MMOL/L (ref 21–28)
HCO3 BLD-SCNC: 31 MMOL/L (ref 21–28)
HCO3 BLD-SCNC: 32 MMOL/L (ref 21–28)
HCO3 BLD-SCNC: 33 MMOL/L (ref 21–28)
HCO3 BLDV-SCNC: 28 MMOL/L (ref 21–28)
HCO3 BLDV-SCNC: 30 MMOL/L (ref 21–28)
HCO3 BLDV-SCNC: 30 MMOL/L (ref 21–28)
HCT VFR BLD AUTO: 39.5 % (ref 35–47)
HCT VFR BLD AUTO: 39.8 % (ref 35–47)
HCT VFR BLD AUTO: 41.9 % (ref 35–47)
HCT VFR BLD AUTO: 42.6 % (ref 35–47)
HCT VFR BLD AUTO: 42.8 % (ref 35–47)
HCT VFR BLD AUTO: 43.3 % (ref 35–47)
HCT VFR BLD AUTO: 43.7 % (ref 35–47)
HCT VFR BLD AUTO: 44.4 % (ref 35–47)
HCT VFR BLD AUTO: 46 % (ref 35–47)
HCT VFR BLD AUTO: 47.7 % (ref 35–47)
HCT VFR BLD CALC: 49 % (ref 35–47)
HDLC SERPL-MCNC: 35 MG/DL
HGB BLD-MCNC: 11.3 G/DL (ref 11.7–15.7)
HGB BLD-MCNC: 11.3 G/DL (ref 11.7–15.7)
HGB BLD-MCNC: 11.4 G/DL (ref 11.7–15.7)
HGB BLD-MCNC: 12 G/DL (ref 11.7–15.7)
HGB BLD-MCNC: 12.2 G/DL (ref 11.7–15.7)
HGB BLD-MCNC: 12.3 G/DL (ref 11.7–15.7)
HGB BLD-MCNC: 12.7 G/DL (ref 11.7–15.7)
HGB BLD-MCNC: 12.8 G/DL (ref 11.7–15.7)
HGB BLD-MCNC: 16.7 G/DL (ref 11.7–15.7)
HGB UR QL STRIP: ABNORMAL
HGB UR QL STRIP: ABNORMAL
IMM GRANULOCYTES # BLD: 0.9 10E3/UL
IMM GRANULOCYTES NFR BLD: 3 %
INR PPP: 1.93 (ref 0.85–1.15)
INTERPRETATION ECG - MUSE: NORMAL
KETONES UR STRIP-MCNC: NEGATIVE MG/DL
KETONES UR STRIP-MCNC: NEGATIVE MG/DL
LACTATE BLD-SCNC: 6.5 MMOL/L
LACTATE SERPL-SCNC: 2.5 MMOL/L (ref 0.7–2)
LDLC SERPL CALC-MCNC: 57 MG/DL
LEUKOCYTE ESTERASE UR QL STRIP: ABNORMAL
LEUKOCYTE ESTERASE UR QL STRIP: ABNORMAL
LIPASE SERPL-CCNC: 74 U/L (ref 73–393)
LISTERIA SPECIES (DETECTED/NOT DETECTED): NOT DETECTED
LISTERIA SPECIES (DETECTED/NOT DETECTED): NOT DETECTED
LYMPHOCYTES # BLD AUTO: 0.9 10E3/UL (ref 0.8–5.3)
LYMPHOCYTES NFR BLD AUTO: 3 %
MAGNESIUM SERPL-MCNC: 1.9 MG/DL (ref 1.6–2.3)
MAGNESIUM SERPL-MCNC: 2.2 MG/DL (ref 1.6–2.3)
MAGNESIUM SERPL-MCNC: 2.4 MG/DL (ref 1.6–2.3)
MAGNESIUM SERPL-MCNC: 2.5 MG/DL (ref 1.6–2.3)
MCH RBC QN AUTO: 20.9 PG (ref 26.5–33)
MCH RBC QN AUTO: 21.2 PG (ref 26.5–33)
MCH RBC QN AUTO: 21.2 PG (ref 26.5–33)
MCH RBC QN AUTO: 21.3 PG (ref 26.5–33)
MCH RBC QN AUTO: 21.3 PG (ref 26.5–33)
MCH RBC QN AUTO: 21.4 PG (ref 26.5–33)
MCH RBC QN AUTO: 21.6 PG (ref 26.5–33)
MCH RBC QN AUTO: 23.1 PG (ref 26.5–33)
MCHC RBC AUTO-ENTMCNC: 26.1 G/DL (ref 31.5–36.5)
MCHC RBC AUTO-ENTMCNC: 26.1 G/DL (ref 31.5–36.5)
MCHC RBC AUTO-ENTMCNC: 26.5 G/DL (ref 31.5–36.5)
MCHC RBC AUTO-ENTMCNC: 26.7 G/DL (ref 31.5–36.5)
MCHC RBC AUTO-ENTMCNC: 26.8 G/DL (ref 31.5–36.5)
MCHC RBC AUTO-ENTMCNC: 27 G/DL (ref 31.5–36.5)
MCHC RBC AUTO-ENTMCNC: 28.5 G/DL (ref 31.5–36.5)
MCHC RBC AUTO-ENTMCNC: 28.6 G/DL (ref 31.5–36.5)
MCHC RBC AUTO-ENTMCNC: 28.9 G/DL (ref 31.5–36.5)
MCHC RBC AUTO-ENTMCNC: 30.3 G/DL (ref 31.5–36.5)
MCV RBC AUTO: 73 FL (ref 78–100)
MCV RBC AUTO: 74 FL (ref 78–100)
MCV RBC AUTO: 75 FL (ref 78–100)
MCV RBC AUTO: 76 FL (ref 78–100)
MCV RBC AUTO: 79 FL (ref 78–100)
MCV RBC AUTO: 79 FL (ref 78–100)
MCV RBC AUTO: 80 FL (ref 78–100)
MCV RBC AUTO: 81 FL (ref 78–100)
MCV RBC AUTO: 81 FL (ref 78–100)
MCV RBC AUTO: 82 FL (ref 78–100)
METHGB MFR BLD: 0.4 % (ref 0–3)
MONOCYTES # BLD AUTO: 1.1 10E3/UL (ref 0–1.3)
MONOCYTES NFR BLD AUTO: 3 %
MUCOUS THREADS #/AREA URNS LPF: PRESENT /LPF
NEUTROPHILS # BLD AUTO: 28.8 10E3/UL (ref 1.6–8.3)
NEUTROPHILS NFR BLD AUTO: 90 %
NITRATE UR QL: NEGATIVE
NITRATE UR QL: POSITIVE
NONHDLC SERPL-MCNC: 74 MG/DL
NRBC # BLD AUTO: 0.2 10E3/UL
NRBC BLD AUTO-RTO: 1 /100
NT-PROBNP SERPL-MCNC: ABNORMAL PG/ML (ref 0–900)
O2/TOTAL GAS SETTING VFR VENT: 100 %
O2/TOTAL GAS SETTING VFR VENT: 50 %
O2/TOTAL GAS SETTING VFR VENT: 60 %
O2/TOTAL GAS SETTING VFR VENT: 60 %
O2/TOTAL GAS SETTING VFR VENT: 70 %
O2/TOTAL GAS SETTING VFR VENT: 80 %
OPIATES UR QL SCN: NORMAL
OXYHGB MFR BLD: 82 % (ref 92–100)
OXYHGB MFR BLD: 91 % (ref 92–100)
OXYHGB MFR BLD: 93 % (ref 92–100)
OXYHGB MFR BLDV: 77 % (ref 70–75)
P AXIS - MUSE: 53 DEGREES
P AXIS - MUSE: NORMAL DEGREES
P AXIS - MUSE: NORMAL DEGREES
PCO2 BLD: 21 MM HG (ref 35–45)
PCO2 BLD: 41 MM HG (ref 35–45)
PCO2 BLD: 41 MM HG (ref 35–45)
PCO2 BLD: 43 MM HG (ref 35–45)
PCO2 BLD: 57 MM HG (ref 35–45)
PCO2 BLD: 73 MM HG (ref 35–45)
PCO2 BLD: 88 MM HG (ref 35–45)
PCO2 BLDV: 46 MM HG (ref 40–50)
PCO2 BLDV: 77 MM HG (ref 40–50)
PCO2 BLDV: 90 MM HG (ref 40–50)
PCP UR QL SCN: NORMAL
PH BLD: 7.15 [PH] (ref 7.35–7.45)
PH BLD: 7.25 [PH] (ref 7.35–7.45)
PH BLD: 7.37 [PH] (ref 7.35–7.45)
PH BLD: 7.4 [PH] (ref 7.35–7.45)
PH BLD: 7.46 [PH] (ref 7.35–7.45)
PH BLD: 7.51 [PH] (ref 7.35–7.45)
PH BLD: 7.51 [PH] (ref 7.35–7.45)
PH BLDV: 7.13 [PH] (ref 7.32–7.43)
PH BLDV: 7.17 [PH] (ref 7.32–7.43)
PH BLDV: 7.43 [PH] (ref 7.32–7.43)
PH UR STRIP: 6 [PH] (ref 5–7)
PH UR STRIP: 6.5 [PH] (ref 5–7)
PHOSPHATE SERPL-MCNC: 12.2 MG/DL (ref 2.5–4.5)
PHOSPHATE SERPL-MCNC: 3.9 MG/DL (ref 2.5–4.5)
PHOSPHATE SERPL-MCNC: 4.5 MG/DL (ref 2.5–4.5)
PLATELET # BLD AUTO: 187 10E3/UL (ref 150–450)
PLATELET # BLD AUTO: 191 10E3/UL (ref 150–450)
PLATELET # BLD AUTO: 238 10E3/UL (ref 150–450)
PLATELET # BLD AUTO: 238 10E3/UL (ref 150–450)
PLATELET # BLD AUTO: 241 10E3/UL (ref 150–450)
PLATELET # BLD AUTO: 317 10E3/UL (ref 150–450)
PLATELET # BLD AUTO: 327 10E3/UL (ref 150–450)
PLATELET # BLD AUTO: 383 10E3/UL (ref 150–450)
PLATELET # BLD AUTO: 400 10E3/UL (ref 150–450)
PLATELET # BLD AUTO: 415 10E3/UL (ref 150–450)
PO2 BLD: 100 MM HG (ref 80–105)
PO2 BLD: 105 MM HG (ref 80–105)
PO2 BLD: 141 MM HG (ref 80–105)
PO2 BLD: 56 MM HG (ref 80–105)
PO2 BLD: 57 MM HG (ref 80–105)
PO2 BLD: 66 MM HG (ref 80–105)
PO2 BLD: 68 MM HG (ref 80–105)
PO2 BLDV: 28 MM HG (ref 25–47)
PO2 BLDV: 37 MM HG (ref 25–47)
PO2 BLDV: 47 MM HG (ref 25–47)
POTASSIUM BLD-SCNC: 3.2 MMOL/L (ref 3.4–5.3)
POTASSIUM BLD-SCNC: 3.4 MMOL/L (ref 3.4–5.3)
POTASSIUM BLD-SCNC: 3.4 MMOL/L (ref 3.4–5.3)
POTASSIUM BLD-SCNC: 3.5 MMOL/L (ref 3.4–5.3)
POTASSIUM BLD-SCNC: 3.6 MMOL/L (ref 3.4–5.3)
POTASSIUM BLD-SCNC: 3.7 MMOL/L (ref 3.4–5.3)
POTASSIUM BLD-SCNC: 3.8 MMOL/L (ref 3.4–5.3)
POTASSIUM BLD-SCNC: 3.9 MMOL/L (ref 3.4–5.3)
POTASSIUM BLD-SCNC: 4 MMOL/L (ref 3.4–5.3)
POTASSIUM BLD-SCNC: 4.2 MMOL/L (ref 3.4–5.3)
POTASSIUM BLD-SCNC: 4.3 MMOL/L (ref 3.4–5.3)
POTASSIUM BLD-SCNC: 4.7 MMOL/L (ref 3.4–5.3)
POTASSIUM BLD-SCNC: 5.2 MMOL/L (ref 3.4–5.3)
POTASSIUM BLD-SCNC: 5.2 MMOL/L (ref 3.4–5.3)
POTASSIUM BLD-SCNC: 5.4 MMOL/L (ref 3.4–5.3)
POTASSIUM BLD-SCNC: 5.6 MMOL/L (ref 3.4–5.3)
PR INTERVAL - MUSE: 190 MS
PR INTERVAL - MUSE: 204 MS
PR INTERVAL - MUSE: NORMAL MS
PROCALCITONIN SERPL-MCNC: 0.44 NG/ML
PROCALCITONIN SERPL-MCNC: 1.63 NG/ML
PROT SERPL-MCNC: 6.3 G/DL (ref 6.8–8.8)
PROT SERPL-MCNC: 6.5 G/DL (ref 6.8–8.8)
PROT SERPL-MCNC: 6.6 G/DL (ref 6.8–8.8)
PROT SERPL-MCNC: 6.7 G/DL (ref 6.8–8.8)
PROT SERPL-MCNC: 6.8 G/DL (ref 6.8–8.8)
PROT SERPL-MCNC: 7 G/DL (ref 6.8–8.8)
PROT SERPL-MCNC: 7 G/DL (ref 6.8–8.8)
PROT SERPL-MCNC: 7.4 G/DL (ref 6.8–8.8)
PROT SERPL-MCNC: 7.5 G/DL (ref 6.8–8.8)
PROT SERPL-MCNC: 8 G/DL (ref 6.8–8.8)
PROT SERPL-MCNC: 8.3 G/DL (ref 6.8–8.8)
QRS DURATION - MUSE: 136 MS
QRS DURATION - MUSE: 140 MS
QRS DURATION - MUSE: 156 MS
QT - MUSE: 318 MS
QT - MUSE: 402 MS
QT - MUSE: 484 MS
QTC - MUSE: 487 MS
QTC - MUSE: 513 MS
QTC - MUSE: 523 MS
R AXIS - MUSE: -76 DEGREES
R AXIS - MUSE: -84 DEGREES
R AXIS - MUSE: -87 DEGREES
RBC # BLD AUTO: 5.27 10E6/UL (ref 3.8–5.2)
RBC # BLD AUTO: 5.29 10E6/UL (ref 3.8–5.2)
RBC # BLD AUTO: 5.32 10E6/UL (ref 3.8–5.2)
RBC # BLD AUTO: 5.35 10E6/UL (ref 3.8–5.2)
RBC # BLD AUTO: 5.36 10E6/UL (ref 3.8–5.2)
RBC # BLD AUTO: 5.49 10E6/UL (ref 3.8–5.2)
RBC # BLD AUTO: 5.6 10E6/UL (ref 3.8–5.2)
RBC # BLD AUTO: 5.79 10E6/UL (ref 3.8–5.2)
RBC # BLD AUTO: 5.83 10E6/UL (ref 3.8–5.2)
RBC # BLD AUTO: 5.98 10E6/UL (ref 3.8–5.2)
RBC #/AREA URNS AUTO: ABNORMAL /HPF
RBC URINE: >182 /HPF
SALICYLATES SERPL-MCNC: 2 MG/DL
SAO2 % BLDV: 37 % (ref 94–100)
SAO2 % BLDV: 50 % (ref 94–100)
SARS-COV-2 RNA RESP QL NAA+PROBE: NEGATIVE
SARS-COV-2 RNA RESP QL NAA+PROBE: NEGATIVE
SODIUM BLD-SCNC: 137 MMOL/L (ref 133–144)
SODIUM SERPL-SCNC: 134 MMOL/L (ref 133–144)
SODIUM SERPL-SCNC: 135 MMOL/L (ref 133–144)
SODIUM SERPL-SCNC: 136 MMOL/L (ref 133–144)
SODIUM SERPL-SCNC: 137 MMOL/L (ref 133–144)
SODIUM SERPL-SCNC: 139 MMOL/L (ref 133–144)
SODIUM SERPL-SCNC: 145 MMOL/L (ref 133–144)
SODIUM SERPL-SCNC: 146 MMOL/L (ref 133–144)
SODIUM SERPL-SCNC: 148 MMOL/L (ref 133–144)
SODIUM SERPL-SCNC: 152 MMOL/L (ref 133–144)
SODIUM SERPL-SCNC: 153 MMOL/L (ref 133–144)
SODIUM SERPL-SCNC: 153 MMOL/L (ref 133–144)
SP GR UR STRIP: 1.02 (ref 1–1.03)
SP GR UR STRIP: >=1.03 (ref 1–1.03)
SPECIMEN EXPIRATION DATE: NORMAL
STAPHYLOCOCCUS AUREUS: NOT DETECTED
STAPHYLOCOCCUS AUREUS: NOT DETECTED
STAPHYLOCOCCUS EPIDERMIDIS: DETECTED
STAPHYLOCOCCUS EPIDERMIDIS: NOT DETECTED
STAPHYLOCOCCUS LUGDUNENSIS: NOT DETECTED
STAPHYLOCOCCUS LUGDUNENSIS: NOT DETECTED
STAPHYLOCOCCUS SPECIES: NOT DETECTED
STREPTOCOCCUS AGALACTIAE: NOT DETECTED
STREPTOCOCCUS AGALACTIAE: NOT DETECTED
STREPTOCOCCUS ANGINOSUS GROUP: NOT DETECTED
STREPTOCOCCUS ANGINOSUS GROUP: NOT DETECTED
STREPTOCOCCUS PNEUMONIAE: NOT DETECTED
STREPTOCOCCUS PNEUMONIAE: NOT DETECTED
STREPTOCOCCUS PYOGENES: NOT DETECTED
STREPTOCOCCUS PYOGENES: NOT DETECTED
STREPTOCOCCUS SPECIES: NOT DETECTED
STREPTOCOCCUS SPECIES: NOT DETECTED
SYSTOLIC BLOOD PRESSURE - MUSE: NORMAL MMHG
T AXIS - MUSE: 26 DEGREES
T AXIS - MUSE: 81 DEGREES
T AXIS - MUSE: 90 DEGREES
T4 FREE SERPL-MCNC: 1.26 NG/DL (ref 0.76–1.46)
T4 FREE SERPL-MCNC: 1.4 NG/DL (ref 0.76–1.46)
TRIGL SERPL-MCNC: 85 MG/DL
TROPONIN I SERPL HS-MCNC: 195 NG/L
TROPONIN T BLD-MCNC: 0.3 UG/L
TSH SERPL DL<=0.005 MIU/L-ACNC: 14.06 MU/L (ref 0.4–4)
TSH SERPL DL<=0.005 MIU/L-ACNC: 14.06 MU/L (ref 0.4–4)
TSH SERPL DL<=0.005 MIU/L-ACNC: 5.41 MU/L (ref 0.4–4)
UROBILINOGEN UR STRIP-ACNC: 1 E.U./DL
UROBILINOGEN UR STRIP-MCNC: NORMAL MG/DL
VANCOMYCIN SERPL-MCNC: 14.1 MG/L
VANCOMYCIN SERPL-MCNC: 16.5 MG/L
VANCOMYCIN SERPL-MCNC: 16.5 MG/L
VENTRICULAR RATE- MUSE: 163 BPM
VENTRICULAR RATE- MUSE: 61 BPM
VENTRICULAR RATE- MUSE: 98 BPM
WBC # BLD AUTO: 13.2 10E3/UL (ref 4–11)
WBC # BLD AUTO: 14.8 10E3/UL (ref 4–11)
WBC # BLD AUTO: 14.9 10E3/UL (ref 4–11)
WBC # BLD AUTO: 16.6 10E3/UL (ref 4–11)
WBC # BLD AUTO: 17.3 10E3/UL (ref 4–11)
WBC # BLD AUTO: 17.8 10E3/UL (ref 4–11)
WBC # BLD AUTO: 20.8 10E3/UL (ref 4–11)
WBC # BLD AUTO: 26.4 10E3/UL (ref 4–11)
WBC # BLD AUTO: 31.9 10E3/UL (ref 4–11)
WBC # BLD AUTO: 34.7 10E3/UL (ref 4–11)
WBC #/AREA URNS AUTO: ABNORMAL /HPF
WBC CLUMPS #/AREA URNS HPF: PRESENT /HPF
WBC URINE: >182 /HPF

## 2021-01-01 PROCEDURE — 99222 1ST HOSP IP/OBS MODERATE 55: CPT | Performed by: PHYSICIAN ASSISTANT

## 2021-01-01 PROCEDURE — 83735 ASSAY OF MAGNESIUM: CPT | Performed by: INTERNAL MEDICINE

## 2021-01-01 PROCEDURE — 258N000003 HC RX IP 258 OP 636: Performed by: INTERNAL MEDICINE

## 2021-01-01 PROCEDURE — 80369 SKELETAL MUSCLE RELAXANT 1/2: CPT | Performed by: INTERNAL MEDICINE

## 2021-01-01 PROCEDURE — G0463 HOSPITAL OUTPT CLINIC VISIT: HCPCS

## 2021-01-01 PROCEDURE — 82330 ASSAY OF CALCIUM: CPT | Performed by: INTERNAL MEDICINE

## 2021-01-01 PROCEDURE — 81001 URINALYSIS AUTO W/SCOPE: CPT | Performed by: EMERGENCY MEDICINE

## 2021-01-01 PROCEDURE — 85027 COMPLETE CBC AUTOMATED: CPT | Performed by: FAMILY MEDICINE

## 2021-01-01 PROCEDURE — 82803 BLOOD GASES ANY COMBINATION: CPT | Performed by: INTERNAL MEDICINE

## 2021-01-01 PROCEDURE — 250N000011 HC RX IP 250 OP 636: Performed by: PHYSICIAN ASSISTANT

## 2021-01-01 PROCEDURE — 250N000009 HC RX 250

## 2021-01-01 PROCEDURE — 81001 URINALYSIS AUTO W/SCOPE: CPT | Performed by: FAMILY MEDICINE

## 2021-01-01 PROCEDURE — 94003 VENT MGMT INPAT SUBQ DAY: CPT

## 2021-01-01 PROCEDURE — 250N000013 HC RX MED GY IP 250 OP 250 PS 637: Performed by: INTERNAL MEDICINE

## 2021-01-01 PROCEDURE — 87636 SARSCOV2 & INF A&B AMP PRB: CPT | Performed by: EMERGENCY MEDICINE

## 2021-01-01 PROCEDURE — 36415 COLL VENOUS BLD VENIPUNCTURE: CPT | Performed by: SURGERY

## 2021-01-01 PROCEDURE — 999N000009 HC STATISTIC AIRWAY CARE

## 2021-01-01 PROCEDURE — 86850 RBC ANTIBODY SCREEN: CPT | Performed by: EMERGENCY MEDICINE

## 2021-01-01 PROCEDURE — 999N000065 XR CHEST PORT 1 VIEW

## 2021-01-01 PROCEDURE — 250N000011 HC RX IP 250 OP 636: Performed by: INTERNAL MEDICINE

## 2021-01-01 PROCEDURE — 120N000013 HC R&B IMCU

## 2021-01-01 PROCEDURE — 84132 ASSAY OF SERUM POTASSIUM: CPT | Performed by: INTERNAL MEDICINE

## 2021-01-01 PROCEDURE — 92610 EVALUATE SWALLOWING FUNCTION: CPT | Mod: GN

## 2021-01-01 PROCEDURE — 250N000011 HC RX IP 250 OP 636

## 2021-01-01 PROCEDURE — 84100 ASSAY OF PHOSPHORUS: CPT | Performed by: SURGERY

## 2021-01-01 PROCEDURE — 80053 COMPREHEN METABOLIC PANEL: CPT | Performed by: FAMILY MEDICINE

## 2021-01-01 PROCEDURE — 99291 CRITICAL CARE FIRST HOUR: CPT | Mod: 25

## 2021-01-01 PROCEDURE — 5A1955Z RESPIRATORY VENTILATION, GREATER THAN 96 CONSECUTIVE HOURS: ICD-10-PCS | Performed by: INTERNAL MEDICINE

## 2021-01-01 PROCEDURE — 86140 C-REACTIVE PROTEIN: CPT | Performed by: INTERNAL MEDICINE

## 2021-01-01 PROCEDURE — 83605 ASSAY OF LACTIC ACID: CPT

## 2021-01-01 PROCEDURE — 82330 ASSAY OF CALCIUM: CPT | Performed by: SURGERY

## 2021-01-01 PROCEDURE — 250N000013 HC RX MED GY IP 250 OP 250 PS 637: Performed by: SURGERY

## 2021-01-01 PROCEDURE — 85027 COMPLETE CBC AUTOMATED: CPT | Performed by: INTERNAL MEDICINE

## 2021-01-01 PROCEDURE — 87086 URINE CULTURE/COLONY COUNT: CPT | Performed by: FAMILY MEDICINE

## 2021-01-01 PROCEDURE — C9803 HOPD COVID-19 SPEC COLLECT: HCPCS

## 2021-01-01 PROCEDURE — 250N000011 HC RX IP 250 OP 636: Performed by: SURGERY

## 2021-01-01 PROCEDURE — 250N000013 HC RX MED GY IP 250 OP 250 PS 637: Performed by: PHYSICIAN ASSISTANT

## 2021-01-01 PROCEDURE — 82310 ASSAY OF CALCIUM: CPT | Performed by: INTERNAL MEDICINE

## 2021-01-01 PROCEDURE — 80143 DRUG ASSAY ACETAMINOPHEN: CPT | Performed by: EMERGENCY MEDICINE

## 2021-01-01 PROCEDURE — 85025 COMPLETE CBC W/AUTO DIFF WBC: CPT | Performed by: EMERGENCY MEDICINE

## 2021-01-01 PROCEDURE — 80053 COMPREHEN METABOLIC PANEL: CPT | Performed by: INTERNAL MEDICINE

## 2021-01-01 PROCEDURE — 83605 ASSAY OF LACTIC ACID: CPT | Performed by: EMERGENCY MEDICINE

## 2021-01-01 PROCEDURE — 99396 PREV VISIT EST AGE 40-64: CPT | Performed by: FAMILY MEDICINE

## 2021-01-01 PROCEDURE — 86140 C-REACTIVE PROTEIN: CPT | Performed by: EMERGENCY MEDICINE

## 2021-01-01 PROCEDURE — 85014 HEMATOCRIT: CPT | Performed by: INTERNAL MEDICINE

## 2021-01-01 PROCEDURE — 99443 PR PHYSICIAN TELEPHONE EVALUATION 21-30 MIN: CPT | Mod: 95 | Performed by: FAMILY MEDICINE

## 2021-01-01 PROCEDURE — 86140 C-REACTIVE PROTEIN: CPT | Performed by: FAMILY MEDICINE

## 2021-01-01 PROCEDURE — 84295 ASSAY OF SERUM SODIUM: CPT

## 2021-01-01 PROCEDURE — 70450 CT HEAD/BRAIN W/O DYE: CPT

## 2021-01-01 PROCEDURE — 84443 ASSAY THYROID STIM HORMONE: CPT | Performed by: EMERGENCY MEDICINE

## 2021-01-01 PROCEDURE — 83690 ASSAY OF LIPASE: CPT | Performed by: INTERNAL MEDICINE

## 2021-01-01 PROCEDURE — 250N000009 HC RX 250: Performed by: INTERNAL MEDICINE

## 2021-01-01 PROCEDURE — 82803 BLOOD GASES ANY COMBINATION: CPT

## 2021-01-01 PROCEDURE — 96375 TX/PRO/DX INJ NEW DRUG ADDON: CPT

## 2021-01-01 PROCEDURE — 82805 BLOOD GASES W/O2 SATURATION: CPT | Performed by: SURGERY

## 2021-01-01 PROCEDURE — 200N000001 HC R&B ICU

## 2021-01-01 PROCEDURE — 87040 BLOOD CULTURE FOR BACTERIA: CPT | Performed by: SURGERY

## 2021-01-01 PROCEDURE — 94660 CPAP INITIATION&MGMT: CPT

## 2021-01-01 PROCEDURE — 258N000001 HC RX 258: Performed by: SURGERY

## 2021-01-01 PROCEDURE — 999N000157 HC STATISTIC RCP TIME EA 10 MIN

## 2021-01-01 PROCEDURE — 250N000011 HC RX IP 250 OP 636: Performed by: EMERGENCY MEDICINE

## 2021-01-01 PROCEDURE — 84132 ASSAY OF SERUM POTASSIUM: CPT | Performed by: EMERGENCY MEDICINE

## 2021-01-01 PROCEDURE — 99442 PR PHYSICIAN TELEPHONE EVALUATION 11-20 MIN: CPT | Mod: 95 | Performed by: FAMILY MEDICINE

## 2021-01-01 PROCEDURE — 84100 ASSAY OF PHOSPHORUS: CPT | Performed by: INTERNAL MEDICINE

## 2021-01-01 PROCEDURE — 250N000012 HC RX MED GY IP 250 OP 636 PS 637: Performed by: INTERNAL MEDICINE

## 2021-01-01 PROCEDURE — 999N000185 HC STATISTIC TRANSPORT TIME EA 15 MIN

## 2021-01-01 PROCEDURE — 84484 ASSAY OF TROPONIN QUANT: CPT

## 2021-01-01 PROCEDURE — 93005 ELECTROCARDIOGRAM TRACING: CPT

## 2021-01-01 PROCEDURE — 71250 CT THORAX DX C-: CPT

## 2021-01-01 PROCEDURE — 250N000009 HC RX 250: Performed by: SURGERY

## 2021-01-01 PROCEDURE — 84439 ASSAY OF FREE THYROXINE: CPT | Performed by: EMERGENCY MEDICINE

## 2021-01-01 PROCEDURE — 99207 PR CONSULT E&M CHANGED TO INITIAL LEVEL: CPT | Performed by: PHYSICIAN ASSISTANT

## 2021-01-01 PROCEDURE — 250N000013 HC RX MED GY IP 250 OP 250 PS 637: Performed by: DIETITIAN, REGISTERED

## 2021-01-01 PROCEDURE — 82140 ASSAY OF AMMONIA: CPT | Performed by: EMERGENCY MEDICINE

## 2021-01-01 PROCEDURE — 85652 RBC SED RATE AUTOMATED: CPT | Performed by: FAMILY MEDICINE

## 2021-01-01 PROCEDURE — 99291 CRITICAL CARE FIRST HOUR: CPT | Performed by: INTERNAL MEDICINE

## 2021-01-01 PROCEDURE — 84132 ASSAY OF SERUM POTASSIUM: CPT | Performed by: SURGERY

## 2021-01-01 PROCEDURE — 80179 DRUG ASSAY SALICYLATE: CPT | Performed by: EMERGENCY MEDICINE

## 2021-01-01 PROCEDURE — 258N000001 HC RX 258: Performed by: EMERGENCY MEDICINE

## 2021-01-01 PROCEDURE — 72125 CT NECK SPINE W/O DYE: CPT

## 2021-01-01 PROCEDURE — 82805 BLOOD GASES W/O2 SATURATION: CPT | Performed by: INTERNAL MEDICINE

## 2021-01-01 PROCEDURE — 258N000003 HC RX IP 258 OP 636

## 2021-01-01 PROCEDURE — 82040 ASSAY OF SERUM ALBUMIN: CPT | Performed by: INTERNAL MEDICINE

## 2021-01-01 PROCEDURE — 83050 HGB METHEMOGLOBIN QUAN: CPT | Performed by: EMERGENCY MEDICINE

## 2021-01-01 PROCEDURE — 258N000003 HC RX IP 258 OP 636: Performed by: EMERGENCY MEDICINE

## 2021-01-01 PROCEDURE — 82805 BLOOD GASES W/O2 SATURATION: CPT | Performed by: EMERGENCY MEDICINE

## 2021-01-01 PROCEDURE — 80061 LIPID PANEL: CPT | Performed by: FAMILY MEDICINE

## 2021-01-01 PROCEDURE — 80202 ASSAY OF VANCOMYCIN: CPT

## 2021-01-01 PROCEDURE — 31500 INSERT EMERGENCY AIRWAY: CPT

## 2021-01-01 PROCEDURE — 83880 ASSAY OF NATRIURETIC PEPTIDE: CPT | Performed by: EMERGENCY MEDICINE

## 2021-01-01 PROCEDURE — 272N000007 HC KIT ART LINE INSERTION

## 2021-01-01 PROCEDURE — 82550 ASSAY OF CK (CPK): CPT | Performed by: FAMILY MEDICINE

## 2021-01-01 PROCEDURE — 84145 PROCALCITONIN (PCT): CPT | Performed by: INTERNAL MEDICINE

## 2021-01-01 PROCEDURE — 36592 COLLECT BLOOD FROM PICC: CPT | Performed by: INTERNAL MEDICINE

## 2021-01-01 PROCEDURE — 94002 VENT MGMT INPAT INIT DAY: CPT

## 2021-01-01 PROCEDURE — 87077 CULTURE AEROBIC IDENTIFY: CPT | Performed by: EMERGENCY MEDICINE

## 2021-01-01 PROCEDURE — 36556 INSERT NON-TUNNEL CV CATH: CPT

## 2021-01-01 PROCEDURE — 99292 CRITICAL CARE ADDL 30 MIN: CPT

## 2021-01-01 PROCEDURE — 999N000065 XR ABDOMEN PORT 1 VIEWS

## 2021-01-01 PROCEDURE — 83036 HEMOGLOBIN GLYCOSYLATED A1C: CPT | Performed by: SURGERY

## 2021-01-01 PROCEDURE — 80202 ASSAY OF VANCOMYCIN: CPT | Performed by: INTERNAL MEDICINE

## 2021-01-01 PROCEDURE — 82077 ASSAY SPEC XCP UR&BREATH IA: CPT | Performed by: EMERGENCY MEDICINE

## 2021-01-01 PROCEDURE — 83735 ASSAY OF MAGNESIUM: CPT | Performed by: EMERGENCY MEDICINE

## 2021-01-01 PROCEDURE — 85027 COMPLETE CBC AUTOMATED: CPT | Performed by: SURGERY

## 2021-01-01 PROCEDURE — 83735 ASSAY OF MAGNESIUM: CPT | Performed by: SURGERY

## 2021-01-01 PROCEDURE — 99421 OL DIG E/M SVC 5-10 MIN: CPT | Performed by: FAMILY MEDICINE

## 2021-01-01 PROCEDURE — 96368 THER/DIAG CONCURRENT INF: CPT

## 2021-01-01 PROCEDURE — 71045 X-RAY EXAM CHEST 1 VIEW: CPT

## 2021-01-01 PROCEDURE — 36415 COLL VENOUS BLD VENIPUNCTURE: CPT | Performed by: EMERGENCY MEDICINE

## 2021-01-01 PROCEDURE — 36415 COLL VENOUS BLD VENIPUNCTURE: CPT | Performed by: INTERNAL MEDICINE

## 2021-01-01 PROCEDURE — 84100 ASSAY OF PHOSPHORUS: CPT | Performed by: EMERGENCY MEDICINE

## 2021-01-01 PROCEDURE — 36415 COLL VENOUS BLD VENIPUNCTURE: CPT | Performed by: FAMILY MEDICINE

## 2021-01-01 PROCEDURE — 84155 ASSAY OF PROTEIN SERUM: CPT | Performed by: INTERNAL MEDICINE

## 2021-01-01 PROCEDURE — 82565 ASSAY OF CREATININE: CPT

## 2021-01-01 PROCEDURE — 85610 PROTHROMBIN TIME: CPT | Performed by: EMERGENCY MEDICINE

## 2021-01-01 PROCEDURE — 250N000013 HC RX MED GY IP 250 OP 250 PS 637: Performed by: EMERGENCY MEDICINE

## 2021-01-01 PROCEDURE — 80053 COMPREHEN METABOLIC PANEL: CPT | Performed by: EMERGENCY MEDICINE

## 2021-01-01 PROCEDURE — 96365 THER/PROPH/DIAG IV INF INIT: CPT

## 2021-01-01 PROCEDURE — 85730 THROMBOPLASTIN TIME PARTIAL: CPT | Performed by: EMERGENCY MEDICINE

## 2021-01-01 PROCEDURE — 83036 HEMOGLOBIN GLYCOSYLATED A1C: CPT | Performed by: FAMILY MEDICINE

## 2021-01-01 PROCEDURE — 85652 RBC SED RATE AUTOMATED: CPT | Performed by: EMERGENCY MEDICINE

## 2021-01-01 PROCEDURE — 84443 ASSAY THYROID STIM HORMONE: CPT | Performed by: FAMILY MEDICINE

## 2021-01-01 PROCEDURE — 87106 FUNGI IDENTIFICATION YEAST: CPT | Performed by: EMERGENCY MEDICINE

## 2021-01-01 PROCEDURE — 3E043XZ INTRODUCTION OF VASOPRESSOR INTO CENTRAL VEIN, PERCUTANEOUS APPROACH: ICD-10-PCS | Performed by: INTERNAL MEDICINE

## 2021-01-01 PROCEDURE — 84484 ASSAY OF TROPONIN QUANT: CPT | Performed by: EMERGENCY MEDICINE

## 2021-01-01 PROCEDURE — 84439 ASSAY OF FREE THYROXINE: CPT | Performed by: FAMILY MEDICINE

## 2021-01-01 PROCEDURE — 87186 SC STD MICRODIL/AGAR DIL: CPT | Performed by: FAMILY MEDICINE

## 2021-01-01 PROCEDURE — 87149 DNA/RNA DIRECT PROBE: CPT | Performed by: EMERGENCY MEDICINE

## 2021-01-01 PROCEDURE — 96366 THER/PROPH/DIAG IV INF ADDON: CPT

## 2021-01-01 PROCEDURE — C9113 INJ PANTOPRAZOLE SODIUM, VIA: HCPCS | Performed by: SURGERY

## 2021-01-01 PROCEDURE — 250N000009 HC RX 250: Performed by: EMERGENCY MEDICINE

## 2021-01-01 PROCEDURE — 80307 DRUG TEST PRSMV CHEM ANLYZR: CPT | Performed by: EMERGENCY MEDICINE

## 2021-01-01 PROCEDURE — 87635 SARS-COV-2 COVID-19 AMP PRB: CPT | Performed by: INTERNAL MEDICINE

## 2021-01-01 PROCEDURE — 82306 VITAMIN D 25 HYDROXY: CPT | Performed by: FAMILY MEDICINE

## 2021-01-01 RX ORDER — OXYCODONE HYDROCHLORIDE 15 MG/1
15 TABLET ORAL EVERY 6 HOURS PRN
Qty: 120 TABLET | Refills: 0 | Status: SHIPPED | OUTPATIENT
Start: 2021-01-01 | End: 2021-01-01

## 2021-01-01 RX ORDER — NOREPINEPHRINE BITARTRATE 0.02 MG/ML
.01-.6 INJECTION, SOLUTION INTRAVENOUS CONTINUOUS
Status: DISCONTINUED | OUTPATIENT
Start: 2021-01-01 | End: 2021-01-01

## 2021-01-01 RX ORDER — IBUPROFEN 200 MG
200 TABLET ORAL EVERY 4 HOURS PRN
COMMUNITY

## 2021-01-01 RX ORDER — LIDOCAINE HYDROCHLORIDE 20 MG/ML
20 JELLY TOPICAL
Status: COMPLETED | OUTPATIENT
Start: 2021-01-01 | End: 2021-01-01

## 2021-01-01 RX ORDER — ATORVASTATIN CALCIUM 10 MG/1
10 TABLET, FILM COATED ORAL DAILY
Qty: 90 TABLET | Refills: 3 | Status: SHIPPED | OUTPATIENT
Start: 2021-01-01

## 2021-01-01 RX ORDER — DEXTROSE MONOHYDRATE 25 G/50ML
25-50 INJECTION, SOLUTION INTRAVENOUS
Status: DISCONTINUED | OUTPATIENT
Start: 2021-01-01 | End: 2021-01-01

## 2021-01-01 RX ORDER — OXYCODONE HCL 5 MG/5 ML
10 SOLUTION, ORAL ORAL EVERY 4 HOURS
Status: DISCONTINUED | OUTPATIENT
Start: 2021-01-01 | End: 2021-01-01

## 2021-01-01 RX ORDER — CEFDINIR 300 MG/1
300 CAPSULE ORAL 2 TIMES DAILY
Qty: 14 CAPSULE | Refills: 0 | Status: SHIPPED | OUTPATIENT
Start: 2021-01-01 | End: 2021-01-01

## 2021-01-01 RX ORDER — AMIODARONE HYDROCHLORIDE 200 MG/1
400 TABLET ORAL DAILY
Status: DISCONTINUED | OUTPATIENT
Start: 2022-01-01 | End: 2021-01-01

## 2021-01-01 RX ORDER — PHENYLEPHRINE HCL IN 0.9% NACL 50MG/250ML
.1-6 PLASTIC BAG, INJECTION (ML) INTRAVENOUS CONTINUOUS
Status: DISCONTINUED | OUTPATIENT
Start: 2021-01-01 | End: 2021-01-01

## 2021-01-01 RX ORDER — ATORVASTATIN CALCIUM 10 MG/1
10 TABLET, FILM COATED ORAL DAILY
Qty: 90 TABLET | Refills: 3 | Status: SHIPPED | OUTPATIENT
Start: 2021-01-01 | End: 2021-01-01

## 2021-01-01 RX ORDER — DULOXETIN HYDROCHLORIDE 60 MG/1
60 CAPSULE, DELAYED RELEASE ORAL 2 TIMES DAILY
Qty: 180 CAPSULE | Refills: 0 | Status: SHIPPED | OUTPATIENT
Start: 2021-01-01 | End: 2021-01-01

## 2021-01-01 RX ORDER — GUAIFENESIN 600 MG/1
15 TABLET, EXTENDED RELEASE ORAL DAILY
Status: DISCONTINUED | OUTPATIENT
Start: 2021-01-01 | End: 2022-01-01 | Stop reason: HOSPADM

## 2021-01-01 RX ORDER — FUROSEMIDE 10 MG/ML
40 INJECTION INTRAMUSCULAR; INTRAVENOUS ONCE
Status: COMPLETED | OUTPATIENT
Start: 2021-01-01 | End: 2021-01-01

## 2021-01-01 RX ORDER — DOXYCYCLINE HYCLATE 100 MG
100 TABLET ORAL 2 TIMES DAILY
Qty: 20 TABLET | Refills: 0 | Status: SHIPPED | OUTPATIENT
Start: 2021-01-01 | End: 2021-01-01

## 2021-01-01 RX ORDER — FUROSEMIDE 10 MG/ML
20 INJECTION INTRAMUSCULAR; INTRAVENOUS EVERY 8 HOURS
Status: DISCONTINUED | OUTPATIENT
Start: 2021-01-01 | End: 2021-01-01

## 2021-01-01 RX ORDER — METOPROLOL SUCCINATE 50 MG/1
50 TABLET, EXTENDED RELEASE ORAL DAILY
Qty: 90 TABLET | Refills: 3 | Status: SHIPPED | OUTPATIENT
Start: 2021-01-01 | End: 2021-01-01

## 2021-01-01 RX ORDER — METOPROLOL SUCCINATE 50 MG/1
50 TABLET, EXTENDED RELEASE ORAL DAILY
Qty: 90 TABLET | Refills: 3 | Status: SHIPPED | OUTPATIENT
Start: 2021-01-01

## 2021-01-01 RX ORDER — METOPROLOL TARTRATE 1 MG/ML
INJECTION, SOLUTION INTRAVENOUS
Status: COMPLETED
Start: 2021-01-01 | End: 2021-01-01

## 2021-01-01 RX ORDER — ACETAMINOPHEN 325 MG/1
650 TABLET ORAL EVERY 4 HOURS PRN
Qty: 30 TABLET | Refills: 1 | Status: SHIPPED | OUTPATIENT
Start: 2021-01-01

## 2021-01-01 RX ORDER — FENTANYL CITRATE 50 UG/ML
INJECTION, SOLUTION INTRAMUSCULAR; INTRAVENOUS
Status: COMPLETED
Start: 2021-01-01 | End: 2021-01-01

## 2021-01-01 RX ORDER — NICOTINE POLACRILEX 4 MG
15-30 LOZENGE BUCCAL
Status: DISCONTINUED | OUTPATIENT
Start: 2021-01-01 | End: 2022-01-01 | Stop reason: HOSPADM

## 2021-01-01 RX ORDER — POTASSIUM CHLORIDE 20MEQ/15ML
40 LIQUID (ML) ORAL ONCE
Status: COMPLETED | OUTPATIENT
Start: 2021-01-01 | End: 2021-01-01

## 2021-01-01 RX ORDER — NICOTINE POLACRILEX 4 MG
15-30 LOZENGE BUCCAL
Status: DISCONTINUED | OUTPATIENT
Start: 2021-01-01 | End: 2021-01-01

## 2021-01-01 RX ORDER — CALCIUM GLUCONATE 20 MG/ML
2 INJECTION, SOLUTION INTRAVENOUS ONCE
Status: COMPLETED | OUTPATIENT
Start: 2021-01-01 | End: 2021-01-01

## 2021-01-01 RX ORDER — POTASSIUM CHLORIDE 20MEQ/15ML
20 LIQUID (ML) ORAL ONCE
Status: COMPLETED | OUTPATIENT
Start: 2021-01-01 | End: 2021-01-01

## 2021-01-01 RX ORDER — AMIODARONE HYDROCHLORIDE 200 MG/1
400 TABLET ORAL 2 TIMES DAILY
Status: DISCONTINUED | OUTPATIENT
Start: 2021-01-01 | End: 2021-01-01

## 2021-01-01 RX ORDER — FENTANYL CITRATE 50 UG/ML
50 INJECTION, SOLUTION INTRAMUSCULAR; INTRAVENOUS ONCE
Status: COMPLETED | OUTPATIENT
Start: 2021-01-01 | End: 2021-01-01

## 2021-01-01 RX ORDER — OXYCODONE HYDROCHLORIDE 5 MG/1
10 TABLET ORAL EVERY 4 HOURS
Status: DISCONTINUED | OUTPATIENT
Start: 2021-01-01 | End: 2021-01-01

## 2021-01-01 RX ORDER — LIDOCAINE 40 MG/G
CREAM TOPICAL
Status: DISCONTINUED | OUTPATIENT
Start: 2021-01-01 | End: 2021-01-01

## 2021-01-01 RX ORDER — DEXTROSE MONOHYDRATE 100 MG/ML
INJECTION, SOLUTION INTRAVENOUS CONTINUOUS PRN
Status: DISCONTINUED | OUTPATIENT
Start: 2021-01-01 | End: 2022-01-01 | Stop reason: HOSPADM

## 2021-01-01 RX ORDER — POLYETHYLENE GLYCOL 3350 17 G/17G
17 POWDER, FOR SOLUTION ORAL DAILY
Status: DISCONTINUED | OUTPATIENT
Start: 2021-01-01 | End: 2022-01-01 | Stop reason: HOSPADM

## 2021-01-01 RX ORDER — POTASSIUM CHLORIDE 29.8 MG/ML
20 INJECTION INTRAVENOUS ONCE
Status: COMPLETED | OUTPATIENT
Start: 2021-01-01 | End: 2021-01-01

## 2021-01-01 RX ORDER — CALCIUM GLUCONATE 94 MG/ML
1 INJECTION, SOLUTION INTRAVENOUS ONCE
Status: COMPLETED | OUTPATIENT
Start: 2021-01-01 | End: 2021-01-01

## 2021-01-01 RX ORDER — HEPARIN SODIUM 5000 [USP'U]/.5ML
5000 INJECTION, SOLUTION INTRAVENOUS; SUBCUTANEOUS EVERY 8 HOURS
Status: DISCONTINUED | OUTPATIENT
Start: 2021-01-01 | End: 2022-01-01 | Stop reason: ALTCHOICE

## 2021-01-01 RX ORDER — DULOXETIN HYDROCHLORIDE 60 MG/1
60 CAPSULE, DELAYED RELEASE ORAL 2 TIMES DAILY
Status: DISCONTINUED | OUTPATIENT
Start: 2021-01-01 | End: 2022-01-01

## 2021-01-01 RX ORDER — CHLORHEXIDINE GLUCONATE ORAL RINSE 1.2 MG/ML
15 SOLUTION DENTAL EVERY 12 HOURS
Status: DISCONTINUED | OUTPATIENT
Start: 2021-01-01 | End: 2021-01-01

## 2021-01-01 RX ORDER — DEXTROSE MONOHYDRATE 25 G/50ML
25 INJECTION, SOLUTION INTRAVENOUS ONCE
Status: COMPLETED | OUTPATIENT
Start: 2021-01-01 | End: 2021-01-01

## 2021-01-01 RX ORDER — OXYCODONE HYDROCHLORIDE 15 MG/1
15 TABLET ORAL EVERY 6 HOURS PRN
Qty: 120 TABLET | Refills: 0 | Status: SHIPPED | OUTPATIENT
Start: 2021-01-01

## 2021-01-01 RX ORDER — MAGNESIUM SULFATE HEPTAHYDRATE 40 MG/ML
2 INJECTION, SOLUTION INTRAVENOUS ONCE
Status: COMPLETED | OUTPATIENT
Start: 2021-01-01 | End: 2021-01-01

## 2021-01-01 RX ORDER — LOSARTAN POTASSIUM AND HYDROCHLOROTHIAZIDE 12.5; 5 MG/1; MG/1
1 TABLET ORAL DAILY
Qty: 90 TABLET | Refills: 3 | Status: SHIPPED | OUTPATIENT
Start: 2021-01-01

## 2021-01-01 RX ORDER — PIPERACILLIN SODIUM, TAZOBACTAM SODIUM 3; .375 G/15ML; G/15ML
3.38 INJECTION, POWDER, LYOPHILIZED, FOR SOLUTION INTRAVENOUS EVERY 8 HOURS
Status: DISCONTINUED | OUTPATIENT
Start: 2021-01-01 | End: 2021-01-01 | Stop reason: DRUGHIGH

## 2021-01-01 RX ORDER — DULOXETIN HYDROCHLORIDE 60 MG/1
60 CAPSULE, DELAYED RELEASE ORAL 2 TIMES DAILY
Qty: 180 CAPSULE | Refills: 3 | Status: SHIPPED | OUTPATIENT
Start: 2021-01-01

## 2021-01-01 RX ORDER — DEXTROSE MONOHYDRATE 100 MG/ML
INJECTION, SOLUTION INTRAVENOUS CONTINUOUS
Status: DISCONTINUED | OUTPATIENT
Start: 2021-01-01 | End: 2021-01-01

## 2021-01-01 RX ORDER — LORAZEPAM 2 MG/ML
INJECTION INTRAMUSCULAR
Status: COMPLETED
Start: 2021-01-01 | End: 2021-01-01

## 2021-01-01 RX ORDER — OXYCODONE HCL 5 MG/5 ML
10 SOLUTION, ORAL ORAL EVERY 4 HOURS
Status: DISCONTINUED | OUTPATIENT
Start: 2021-01-01 | End: 2022-01-01

## 2021-01-01 RX ORDER — LOSARTAN POTASSIUM AND HYDROCHLOROTHIAZIDE 12.5; 5 MG/1; MG/1
1 TABLET ORAL DAILY
Qty: 90 TABLET | Refills: 0 | Status: SHIPPED | OUTPATIENT
Start: 2021-01-01 | End: 2021-01-01

## 2021-01-01 RX ORDER — DEXMEDETOMIDINE HYDROCHLORIDE 4 UG/ML
.1-1.2 INJECTION, SOLUTION INTRAVENOUS CONTINUOUS
Status: DISCONTINUED | OUTPATIENT
Start: 2021-01-01 | End: 2021-01-01

## 2021-01-01 RX ORDER — ATORVASTATIN CALCIUM 10 MG/1
TABLET, FILM COATED ORAL
Qty: 90 TABLET | Refills: 0 | Status: SHIPPED | OUTPATIENT
Start: 2021-01-01 | End: 2021-01-01

## 2021-01-01 RX ORDER — PIPERACILLIN SODIUM, TAZOBACTAM SODIUM 4; .5 G/20ML; G/20ML
4.5 INJECTION, POWDER, LYOPHILIZED, FOR SOLUTION INTRAVENOUS EVERY 6 HOURS
Status: DISCONTINUED | OUTPATIENT
Start: 2021-01-01 | End: 2022-01-01

## 2021-01-01 RX ORDER — ETOMIDATE 2 MG/ML
30 INJECTION INTRAVENOUS ONCE
Status: COMPLETED | OUTPATIENT
Start: 2021-01-01 | End: 2021-01-01

## 2021-01-01 RX ORDER — ACETAZOLAMIDE 250 MG/1
250 TABLET ORAL
Status: DISCONTINUED | OUTPATIENT
Start: 2021-01-01 | End: 2021-01-01

## 2021-01-01 RX ORDER — METOPROLOL TARTRATE 1 MG/ML
5 INJECTION, SOLUTION INTRAVENOUS EVERY 5 MIN PRN
Status: DISCONTINUED | OUTPATIENT
Start: 2021-01-01 | End: 2021-01-01

## 2021-01-01 RX ORDER — AMIODARONE HYDROCHLORIDE 200 MG/1
400 TABLET ORAL DAILY
Status: DISCONTINUED | OUTPATIENT
Start: 2022-01-01 | End: 2022-01-01 | Stop reason: HOSPADM

## 2021-01-01 RX ORDER — POTASSIUM CHLORIDE 20MEQ/15ML
40 LIQUID (ML) ORAL ONCE
Status: CANCELLED | OUTPATIENT
Start: 2021-01-01 | End: 2021-01-01

## 2021-01-01 RX ORDER — SODIUM CHLORIDE, SODIUM LACTATE, POTASSIUM CHLORIDE, CALCIUM CHLORIDE 600; 310; 30; 20 MG/100ML; MG/100ML; MG/100ML; MG/100ML
INJECTION, SOLUTION INTRAVENOUS CONTINUOUS
Status: DISCONTINUED | OUTPATIENT
Start: 2021-01-01 | End: 2021-01-01

## 2021-01-01 RX ORDER — CHLORPHENIRAMINE MALEATE, DEXTROMETHORPHAN HBR 4; 30 MG/1; MG/1
1 TABLET, FILM COATED ORAL EVERY 6 HOURS PRN
COMMUNITY

## 2021-01-01 RX ORDER — AMITRIPTYLINE HYDROCHLORIDE 100 MG/1
100 TABLET ORAL AT BEDTIME
Qty: 90 TABLET | Refills: 3 | Status: SHIPPED | OUTPATIENT
Start: 2021-01-01

## 2021-01-01 RX ORDER — MIDAZOLAM HCL IN 0.9 % NACL/PF 1 MG/ML
1-8 PLASTIC BAG, INJECTION (ML) INTRAVENOUS CONTINUOUS
Status: DISCONTINUED | OUTPATIENT
Start: 2021-01-01 | End: 2021-01-01

## 2021-01-01 RX ORDER — AMITRIPTYLINE HYDROCHLORIDE 100 MG/1
100 TABLET ORAL AT BEDTIME
Qty: 90 TABLET | Refills: 3 | Status: SHIPPED | OUTPATIENT
Start: 2021-01-01 | End: 2021-01-01

## 2021-01-01 RX ORDER — DEXTROSE MONOHYDRATE 25 G/50ML
25-50 INJECTION, SOLUTION INTRAVENOUS
Status: DISCONTINUED | OUTPATIENT
Start: 2021-01-01 | End: 2022-01-01 | Stop reason: HOSPADM

## 2021-01-01 RX ORDER — PIPERACILLIN SODIUM, TAZOBACTAM SODIUM 3; .375 G/15ML; G/15ML
3.38 INJECTION, POWDER, LYOPHILIZED, FOR SOLUTION INTRAVENOUS EVERY 6 HOURS
Status: DISCONTINUED | OUTPATIENT
Start: 2021-01-01 | End: 2021-01-01

## 2021-01-01 RX ORDER — AMIODARONE HYDROCHLORIDE 200 MG/1
400 TABLET ORAL 2 TIMES DAILY
Status: COMPLETED | OUTPATIENT
Start: 2021-01-01 | End: 2022-01-01

## 2021-01-01 RX ORDER — FENTANYL CITRATE 50 UG/ML
50 INJECTION, SOLUTION INTRAMUSCULAR; INTRAVENOUS EVERY 30 MIN PRN
Status: DISCONTINUED | OUTPATIENT
Start: 2021-01-01 | End: 2021-01-01

## 2021-01-01 RX ORDER — DEXTROSE MONOHYDRATE 100 MG/ML
INJECTION, SOLUTION INTRAVENOUS CONTINUOUS PRN
Status: DISCONTINUED | OUTPATIENT
Start: 2021-01-01 | End: 2022-01-01

## 2021-01-01 RX ORDER — FENTANYL CITRATE 50 UG/ML
25-50 INJECTION, SOLUTION INTRAMUSCULAR; INTRAVENOUS
Status: DISCONTINUED | OUTPATIENT
Start: 2021-01-01 | End: 2022-01-01

## 2021-01-01 RX ORDER — POTASSIUM CHLORIDE 7.45 MG/ML
10 INJECTION INTRAVENOUS
Status: DISCONTINUED | OUTPATIENT
Start: 2021-01-01 | End: 2021-01-01

## 2021-01-01 RX ORDER — PIPERACILLIN SODIUM, TAZOBACTAM SODIUM 4; .5 G/20ML; G/20ML
4.5 INJECTION, POWDER, LYOPHILIZED, FOR SOLUTION INTRAVENOUS ONCE
Status: COMPLETED | OUTPATIENT
Start: 2021-01-01 | End: 2021-01-01

## 2021-01-01 RX ORDER — NOREPINEPHRINE BITARTRATE 0.02 MG/ML
INJECTION, SOLUTION INTRAVENOUS
Status: COMPLETED
Start: 2021-01-01 | End: 2021-01-01

## 2021-01-01 RX ORDER — SODIUM CHLORIDE 9 MG/ML
INJECTION, SOLUTION INTRAVENOUS CONTINUOUS
Status: DISCONTINUED | OUTPATIENT
Start: 2021-01-01 | End: 2021-01-01

## 2021-01-01 RX ADMIN — OXYCODONE HYDROCHLORIDE 10 MG: 5 SOLUTION ORAL at 20:24

## 2021-01-01 RX ADMIN — DULOXETINE 60 MG: 60 CAPSULE, DELAYED RELEASE ORAL at 16:30

## 2021-01-01 RX ADMIN — OXYCODONE HYDROCHLORIDE 10 MG: 5 SOLUTION ORAL at 19:23

## 2021-01-01 RX ADMIN — DEXTROSE MONOHYDRATE: 100 INJECTION, SOLUTION INTRAVENOUS at 22:00

## 2021-01-01 RX ADMIN — PIPERACILLIN SODIUM AND TAZOBACTAM SODIUM 4.5 G: 4; .5 INJECTION, POWDER, LYOPHILIZED, FOR SOLUTION INTRAVENOUS at 14:29

## 2021-01-01 RX ADMIN — PIPERACILLIN SODIUM AND TAZOBACTAM SODIUM 3.38 G: 3; .375 INJECTION, POWDER, LYOPHILIZED, FOR SOLUTION INTRAVENOUS at 12:16

## 2021-01-01 RX ADMIN — OXYCODONE HYDROCHLORIDE 10 MG: 5 SOLUTION ORAL at 07:56

## 2021-01-01 RX ADMIN — OXYCODONE HYDROCHLORIDE 10 MG: 5 SOLUTION ORAL at 15:53

## 2021-01-01 RX ADMIN — MAGNESIUM SULFATE HEPTAHYDRATE 2 G: 40 INJECTION, SOLUTION INTRAVENOUS at 20:02

## 2021-01-01 RX ADMIN — DEXTROSE MONOHYDRATE 25 G: 500 INJECTION PARENTERAL at 22:02

## 2021-01-01 RX ADMIN — HEPARIN SODIUM 5000 UNITS: 5000 INJECTION INTRAVENOUS; SUBCUTANEOUS at 17:06

## 2021-01-01 RX ADMIN — OXYCODONE HYDROCHLORIDE 10 MG: 5 SOLUTION ORAL at 17:24

## 2021-01-01 RX ADMIN — DULOXETINE 60 MG: 60 CAPSULE, DELAYED RELEASE ORAL at 05:03

## 2021-01-01 RX ADMIN — FENTANYL CITRATE 50 MCG: 50 INJECTION INTRAMUSCULAR; INTRAVENOUS at 06:07

## 2021-01-01 RX ADMIN — OXYCODONE HYDROCHLORIDE 10 MG: 5 SOLUTION ORAL at 08:24

## 2021-01-01 RX ADMIN — Medication 1 MCG/KG/HR: at 22:14

## 2021-01-01 RX ADMIN — OXYCODONE HYDROCHLORIDE 10 MG: 5 SOLUTION ORAL at 16:30

## 2021-01-01 RX ADMIN — FUROSEMIDE 20 MG: 10 INJECTION, SOLUTION INTRAVENOUS at 19:23

## 2021-01-01 RX ADMIN — Medication 0.6 MCG/KG/HR: at 01:57

## 2021-01-01 RX ADMIN — PIPERACILLIN SODIUM AND TAZOBACTAM SODIUM 4.5 G: 4; .5 INJECTION, POWDER, LYOPHILIZED, FOR SOLUTION INTRAVENOUS at 20:24

## 2021-01-01 RX ADMIN — PIPERACILLIN SODIUM AND TAZOBACTAM SODIUM 4.5 G: 4; .5 INJECTION, POWDER, LYOPHILIZED, FOR SOLUTION INTRAVENOUS at 01:01

## 2021-01-01 RX ADMIN — SODIUM CHLORIDE: 9 INJECTION, SOLUTION INTRAVENOUS at 15:47

## 2021-01-01 RX ADMIN — ETOMIDATE 30 MG: 20 INJECTION, SOLUTION INTRAVENOUS at 21:28

## 2021-01-01 RX ADMIN — Medication 25 MCG: at 02:30

## 2021-01-01 RX ADMIN — OXYCODONE HYDROCHLORIDE 10 MG: 5 SOLUTION ORAL at 20:50

## 2021-01-01 RX ADMIN — Medication 0.8 MCG/KG/HR: at 08:22

## 2021-01-01 RX ADMIN — FENTANYL CITRATE 50 MCG: 50 INJECTION INTRAMUSCULAR; INTRAVENOUS at 01:01

## 2021-01-01 RX ADMIN — Medication 0.8 MCG/KG/HR: at 02:53

## 2021-01-01 RX ADMIN — PIPERACILLIN SODIUM AND TAZOBACTAM SODIUM 4.5 G: 4; .5 INJECTION, POWDER, LYOPHILIZED, FOR SOLUTION INTRAVENOUS at 07:57

## 2021-01-01 RX ADMIN — MIDAZOLAM HYDROCHLORIDE 4 MG/HR: 1 INJECTION, SOLUTION INTRAVENOUS at 21:40

## 2021-01-01 RX ADMIN — OXYCODONE HYDROCHLORIDE 10 MG: 5 SOLUTION ORAL at 00:58

## 2021-01-01 RX ADMIN — PIPERACILLIN SODIUM AND TAZOBACTAM SODIUM 3.38 G: 3; .375 INJECTION, POWDER, LYOPHILIZED, FOR SOLUTION INTRAVENOUS at 18:46

## 2021-01-01 RX ADMIN — PHENYLEPHRINE HYDROCHLORIDE 1.5 MCG/KG/MIN: 10 INJECTION INTRAVENOUS at 02:44

## 2021-01-01 RX ADMIN — INSULIN ASPART 1 UNITS: 100 INJECTION, SOLUTION INTRAVENOUS; SUBCUTANEOUS at 11:55

## 2021-01-01 RX ADMIN — ACETAZOLAMIDE 250 MG: 250 TABLET ORAL at 08:39

## 2021-01-01 RX ADMIN — OXYCODONE HYDROCHLORIDE 10 MG: 5 SOLUTION ORAL at 21:29

## 2021-01-01 RX ADMIN — PHENYLEPHRINE HYDROCHLORIDE 0.1 MCG/KG/MIN: 10 INJECTION INTRAVENOUS at 03:59

## 2021-01-01 RX ADMIN — HEPARIN SODIUM 5000 UNITS: 5000 INJECTION INTRAVENOUS; SUBCUTANEOUS at 07:57

## 2021-01-01 RX ADMIN — Medication 0.4 MCG/KG/HR: at 11:32

## 2021-01-01 RX ADMIN — FENTANYL CITRATE 50 MCG: 50 INJECTION INTRAMUSCULAR; INTRAVENOUS at 04:07

## 2021-01-01 RX ADMIN — MULTIVIT AND MINERALS-FERROUS GLUCONATE 9 MG IRON/15 ML ORAL LIQUID 15 ML: at 19:16

## 2021-01-01 RX ADMIN — ROCURONIUM BROMIDE 100 MG: 50 INJECTION, SOLUTION INTRAVENOUS at 21:27

## 2021-01-01 RX ADMIN — MIDAZOLAM 1 MG: 1 INJECTION INTRAMUSCULAR; INTRAVENOUS at 01:10

## 2021-01-01 RX ADMIN — OXYCODONE HYDROCHLORIDE 10 MG: 5 SOLUTION ORAL at 08:18

## 2021-01-01 RX ADMIN — Medication 0.8 MCG/KG/HR: at 19:32

## 2021-01-01 RX ADMIN — HUMAN INSULIN 10 UNITS: 100 INJECTION, SOLUTION SUBCUTANEOUS at 22:04

## 2021-01-01 RX ADMIN — OXYCODONE HYDROCHLORIDE 10 MG: 5 SOLUTION ORAL at 04:04

## 2021-01-01 RX ADMIN — LORAZEPAM 2 MG: 2 INJECTION INTRAMUSCULAR; INTRAVENOUS at 21:16

## 2021-01-01 RX ADMIN — FUROSEMIDE 20 MG: 10 INJECTION, SOLUTION INTRAVENOUS at 20:50

## 2021-01-01 RX ADMIN — PIPERACILLIN SODIUM AND TAZOBACTAM SODIUM 4.5 G: 4; .5 INJECTION, POWDER, LYOPHILIZED, FOR SOLUTION INTRAVENOUS at 01:29

## 2021-01-01 RX ADMIN — PIPERACILLIN SODIUM AND TAZOBACTAM SODIUM 4.5 G: 4; .5 INJECTION, POWDER, LYOPHILIZED, FOR SOLUTION INTRAVENOUS at 08:38

## 2021-01-01 RX ADMIN — INSULIN ASPART 1 UNITS: 100 INJECTION, SOLUTION INTRAVENOUS; SUBCUTANEOUS at 21:07

## 2021-01-01 RX ADMIN — DULOXETINE 60 MG: 60 CAPSULE, DELAYED RELEASE ORAL at 03:08

## 2021-01-01 RX ADMIN — HEPARIN SODIUM 5000 UNITS: 5000 INJECTION INTRAVENOUS; SUBCUTANEOUS at 08:33

## 2021-01-01 RX ADMIN — POTASSIUM CHLORIDE 10 MEQ: 7.46 INJECTION, SOLUTION INTRAVENOUS at 07:30

## 2021-01-01 RX ADMIN — HEPARIN SODIUM 5000 UNITS: 5000 INJECTION INTRAVENOUS; SUBCUTANEOUS at 16:08

## 2021-01-01 RX ADMIN — Medication 40 MG: at 08:25

## 2021-01-01 RX ADMIN — SODIUM CHLORIDE: 9 INJECTION, SOLUTION INTRAVENOUS at 16:55

## 2021-01-01 RX ADMIN — POLYETHYLENE GLYCOL 3350 17 G: 17 POWDER, FOR SOLUTION ORAL at 13:13

## 2021-01-01 RX ADMIN — Medication 0.6 MCG/KG/HR: at 20:25

## 2021-01-01 RX ADMIN — PHENYLEPHRINE HYDROCHLORIDE 0.5 MCG/KG/MIN: 10 INJECTION INTRAVENOUS at 17:24

## 2021-01-01 RX ADMIN — DOCUSATE SODIUM 100 MG: 50 LIQUID ORAL at 13:13

## 2021-01-01 RX ADMIN — Medication 0.1 MCG/KG/HR: at 11:22

## 2021-01-01 RX ADMIN — POTASSIUM CHLORIDE 10 MEQ: 7.46 INJECTION, SOLUTION INTRAVENOUS at 06:27

## 2021-01-01 RX ADMIN — AMIODARONE HYDROCHLORIDE 400 MG: 200 TABLET ORAL at 21:47

## 2021-01-01 RX ADMIN — AMIODARONE HYDROCHLORIDE 400 MG: 200 TABLET ORAL at 22:31

## 2021-01-01 RX ADMIN — FENTANYL CITRATE 50 MCG: 50 INJECTION, SOLUTION INTRAMUSCULAR; INTRAVENOUS at 04:07

## 2021-01-01 RX ADMIN — AMIODARONE HYDROCHLORIDE 400 MG: 200 TABLET ORAL at 21:19

## 2021-01-01 RX ADMIN — FUROSEMIDE 40 MG: 10 INJECTION, SOLUTION INTRAVENOUS at 22:06

## 2021-01-01 RX ADMIN — OXYCODONE HYDROCHLORIDE 10 MG: 5 SOLUTION ORAL at 00:42

## 2021-01-01 RX ADMIN — Medication 0.4 MCG/KG/HR: at 04:11

## 2021-01-01 RX ADMIN — OXYCODONE HYDROCHLORIDE 10 MG: 5 SOLUTION ORAL at 13:14

## 2021-01-01 RX ADMIN — FENTANYL CITRATE 50 MCG: 50 INJECTION, SOLUTION INTRAMUSCULAR; INTRAVENOUS at 04:35

## 2021-01-01 RX ADMIN — PIPERACILLIN SODIUM AND TAZOBACTAM SODIUM 4.5 G: 4; .5 INJECTION, POWDER, LYOPHILIZED, FOR SOLUTION INTRAVENOUS at 20:51

## 2021-01-01 RX ADMIN — OXYCODONE HYDROCHLORIDE 10 MG: 5 SOLUTION ORAL at 08:23

## 2021-01-01 RX ADMIN — AMIODARONE HYDROCHLORIDE 0.5 MG/MIN: 50 INJECTION, SOLUTION INTRAVENOUS at 19:47

## 2021-01-01 RX ADMIN — CHLORHEXIDINE GLUCONATE 15 ML: 1.2 SOLUTION ORAL at 19:23

## 2021-01-01 RX ADMIN — MULTIVIT AND MINERALS-FERROUS GLUCONATE 9 MG IRON/15 ML ORAL LIQUID 15 ML: at 08:38

## 2021-01-01 RX ADMIN — INSULIN ASPART 1 UNITS: 100 INJECTION, SOLUTION INTRAVENOUS; SUBCUTANEOUS at 08:25

## 2021-01-01 RX ADMIN — CHLORHEXIDINE GLUCONATE 15 ML: 1.2 SOLUTION ORAL at 10:09

## 2021-01-01 RX ADMIN — PHENYLEPHRINE HYDROCHLORIDE 1 MCG/KG/MIN: 10 INJECTION INTRAVENOUS at 12:01

## 2021-01-01 RX ADMIN — Medication 0.8 MCG/KG/HR: at 07:54

## 2021-01-01 RX ADMIN — CHLORHEXIDINE GLUCONATE 15 ML: 1.2 SOLUTION ORAL at 08:39

## 2021-01-01 RX ADMIN — PIPERACILLIN SODIUM AND TAZOBACTAM SODIUM 4.5 G: 4; .5 INJECTION, POWDER, LYOPHILIZED, FOR SOLUTION INTRAVENOUS at 08:23

## 2021-01-01 RX ADMIN — OXYCODONE HYDROCHLORIDE 10 MG: 5 SOLUTION ORAL at 11:33

## 2021-01-01 RX ADMIN — HEPARIN SODIUM 5000 UNITS: 5000 INJECTION INTRAVENOUS; SUBCUTANEOUS at 00:46

## 2021-01-01 RX ADMIN — PIPERACILLIN SODIUM AND TAZOBACTAM SODIUM 4.5 G: 4; .5 INJECTION, POWDER, LYOPHILIZED, FOR SOLUTION INTRAVENOUS at 19:23

## 2021-01-01 RX ADMIN — Medication 25 MCG: at 21:34

## 2021-01-01 RX ADMIN — FENTANYL CITRATE 50 MCG/HR: 50 INJECTION INTRAVENOUS at 03:46

## 2021-01-01 RX ADMIN — OXYCODONE HYDROCHLORIDE 10 MG: 5 SOLUTION ORAL at 03:59

## 2021-01-01 RX ADMIN — Medication 25 MCG: at 08:36

## 2021-01-01 RX ADMIN — OXYCODONE HYDROCHLORIDE 10 MG: 5 SOLUTION ORAL at 12:43

## 2021-01-01 RX ADMIN — PIPERACILLIN SODIUM AND TAZOBACTAM SODIUM 4.5 G: 4; .5 INJECTION, POWDER, LYOPHILIZED, FOR SOLUTION INTRAVENOUS at 03:07

## 2021-01-01 RX ADMIN — HEPARIN SODIUM 5000 UNITS: 5000 INJECTION INTRAVENOUS; SUBCUTANEOUS at 12:16

## 2021-01-01 RX ADMIN — OXYCODONE HYDROCHLORIDE 10 MG: 5 TABLET ORAL at 14:19

## 2021-01-01 RX ADMIN — FUROSEMIDE 20 MG: 10 INJECTION, SOLUTION INTRAVENOUS at 03:59

## 2021-01-01 RX ADMIN — NOREPINEPHRINE BITARTRATE: 0.02 INJECTION, SOLUTION INTRAVENOUS at 23:03

## 2021-01-01 RX ADMIN — PIPERACILLIN SODIUM AND TAZOBACTAM SODIUM 3.38 G: 3; .375 INJECTION, POWDER, LYOPHILIZED, FOR SOLUTION INTRAVENOUS at 06:47

## 2021-01-01 RX ADMIN — PIPERACILLIN SODIUM AND TAZOBACTAM SODIUM 4.5 G: 4; .5 INJECTION, POWDER, LYOPHILIZED, FOR SOLUTION INTRAVENOUS at 01:59

## 2021-01-01 RX ADMIN — Medication 25 MCG: at 00:10

## 2021-01-01 RX ADMIN — POTASSIUM CHLORIDE 20 MEQ: 29.8 INJECTION, SOLUTION INTRAVENOUS at 21:08

## 2021-01-01 RX ADMIN — CHLORHEXIDINE GLUCONATE 15 ML: 1.2 SOLUTION ORAL at 08:25

## 2021-01-01 RX ADMIN — Medication 0.2 MCG/KG/HR: at 14:21

## 2021-01-01 RX ADMIN — OXYCODONE HYDROCHLORIDE 10 MG: 5 SOLUTION ORAL at 03:08

## 2021-01-01 RX ADMIN — CHLORHEXIDINE GLUCONATE 15 ML: 1.2 SOLUTION ORAL at 08:23

## 2021-01-01 RX ADMIN — DULOXETINE 60 MG: 60 CAPSULE, DELAYED RELEASE ORAL at 18:11

## 2021-01-01 RX ADMIN — FUROSEMIDE 20 MG: 10 INJECTION, SOLUTION INTRAVENOUS at 12:43

## 2021-01-01 RX ADMIN — VANCOMYCIN HYDROCHLORIDE 1250 MG: 5 INJECTION, POWDER, LYOPHILIZED, FOR SOLUTION INTRAVENOUS at 23:57

## 2021-01-01 RX ADMIN — INSULIN ASPART 1 UNITS: 100 INJECTION, SOLUTION INTRAVENOUS; SUBCUTANEOUS at 00:44

## 2021-01-01 RX ADMIN — ACETAZOLAMIDE 250 MG: 250 TABLET ORAL at 12:43

## 2021-01-01 RX ADMIN — MIDAZOLAM 2 MG: 1 INJECTION INTRAMUSCULAR; INTRAVENOUS at 14:00

## 2021-01-01 RX ADMIN — FUROSEMIDE 20 MG: 10 INJECTION, SOLUTION INTRAVENOUS at 13:14

## 2021-01-01 RX ADMIN — CALCIUM GLUCONATE 2 G: 20 INJECTION, SOLUTION INTRAVENOUS at 22:56

## 2021-01-01 RX ADMIN — HEPARIN SODIUM 5000 UNITS: 5000 INJECTION INTRAVENOUS; SUBCUTANEOUS at 08:38

## 2021-01-01 RX ADMIN — PHENYLEPHRINE HYDROCHLORIDE 0.4 MCG/KG/MIN: 10 INJECTION INTRAVENOUS at 18:07

## 2021-01-01 RX ADMIN — AMIODARONE HYDROCHLORIDE 150 MG: 1.5 INJECTION, SOLUTION INTRAVENOUS at 17:38

## 2021-01-01 RX ADMIN — FUROSEMIDE 20 MG: 10 INJECTION, SOLUTION INTRAVENOUS at 04:53

## 2021-01-01 RX ADMIN — Medication 1 MCG/KG/HR: at 10:23

## 2021-01-01 RX ADMIN — OXYCODONE HYDROCHLORIDE 10 MG: 5 SOLUTION ORAL at 04:53

## 2021-01-01 RX ADMIN — PIPERACILLIN SODIUM AND TAZOBACTAM SODIUM 4.5 G: 4; .5 INJECTION, POWDER, LYOPHILIZED, FOR SOLUTION INTRAVENOUS at 13:13

## 2021-01-01 RX ADMIN — PHENYLEPHRINE HYDROCHLORIDE 1.5 MCG/KG/MIN: 10 INJECTION INTRAVENOUS at 22:12

## 2021-01-01 RX ADMIN — PIPERACILLIN SODIUM AND TAZOBACTAM SODIUM 4.5 G: 4; .5 INJECTION, POWDER, LYOPHILIZED, FOR SOLUTION INTRAVENOUS at 14:25

## 2021-01-01 RX ADMIN — HEPARIN SODIUM 5000 UNITS: 5000 INJECTION INTRAVENOUS; SUBCUTANEOUS at 00:58

## 2021-01-01 RX ADMIN — Medication 1 MCG/KG/HR: at 16:57

## 2021-01-01 RX ADMIN — PIPERACILLIN SODIUM AND TAZOBACTAM SODIUM 4.5 G: 4; .5 INJECTION, POWDER, LYOPHILIZED, FOR SOLUTION INTRAVENOUS at 20:03

## 2021-01-01 RX ADMIN — CALCIUM CHLORIDE 2 G: 100 INJECTION, SOLUTION INTRAVENOUS at 14:20

## 2021-01-01 RX ADMIN — CHLORHEXIDINE GLUCONATE 15 ML: 1.2 SOLUTION ORAL at 19:59

## 2021-01-01 RX ADMIN — METOPROLOL TARTRATE 5 MG: 5 INJECTION INTRAVENOUS at 17:00

## 2021-01-01 RX ADMIN — OXYCODONE HYDROCHLORIDE 10 MG: 5 SOLUTION ORAL at 20:03

## 2021-01-01 RX ADMIN — Medication 0.4 MCG/KG/HR: at 19:01

## 2021-01-01 RX ADMIN — FENTANYL CITRATE 75 MCG/HR: 50 INJECTION INTRAVENOUS at 11:28

## 2021-01-01 RX ADMIN — AMIODARONE HYDROCHLORIDE 150 MG: 1.5 INJECTION, SOLUTION INTRAVENOUS at 17:27

## 2021-01-01 RX ADMIN — PIPERACILLIN SODIUM AND TAZOBACTAM SODIUM 3.38 G: 3; .375 INJECTION, POWDER, LYOPHILIZED, FOR SOLUTION INTRAVENOUS at 13:16

## 2021-01-01 RX ADMIN — OXYCODONE HYDROCHLORIDE 10 MG: 5 SOLUTION ORAL at 05:03

## 2021-01-01 RX ADMIN — OXYCODONE HYDROCHLORIDE 10 MG: 5 SOLUTION ORAL at 16:57

## 2021-01-01 RX ADMIN — OXYCODONE HYDROCHLORIDE 10 MG: 5 SOLUTION ORAL at 17:16

## 2021-01-01 RX ADMIN — POTASSIUM CHLORIDE 40 MEQ: 20 SOLUTION ORAL at 14:36

## 2021-01-01 RX ADMIN — Medication 25 MCG: at 22:34

## 2021-01-01 RX ADMIN — FUROSEMIDE 20 MG: 10 INJECTION, SOLUTION INTRAVENOUS at 12:56

## 2021-01-01 RX ADMIN — ACETAZOLAMIDE 250 MG: 250 TABLET ORAL at 17:24

## 2021-01-01 RX ADMIN — Medication 25 MCG: at 12:15

## 2021-01-01 RX ADMIN — HEPARIN SODIUM 5000 UNITS: 5000 INJECTION INTRAVENOUS; SUBCUTANEOUS at 16:31

## 2021-01-01 RX ADMIN — HEPARIN SODIUM 5000 UNITS: 5000 INJECTION INTRAVENOUS; SUBCUTANEOUS at 16:57

## 2021-01-01 RX ADMIN — OXYCODONE HYDROCHLORIDE 10 MG: 5 SOLUTION ORAL at 17:05

## 2021-01-01 RX ADMIN — MULTIVIT AND MINERALS-FERROUS GLUCONATE 9 MG IRON/15 ML ORAL LIQUID 15 ML: at 08:18

## 2021-01-01 RX ADMIN — MIDAZOLAM 2 MG: 1 INJECTION INTRAMUSCULAR; INTRAVENOUS at 10:00

## 2021-01-01 RX ADMIN — SENNOSIDES 5 ML: 8.8 LIQUID ORAL at 20:50

## 2021-01-01 RX ADMIN — AMIODARONE HYDROCHLORIDE 400 MG: 200 TABLET ORAL at 09:53

## 2021-01-01 RX ADMIN — POTASSIUM CHLORIDE 20 MEQ: 20 SOLUTION ORAL at 10:04

## 2021-01-01 RX ADMIN — OXYCODONE HYDROCHLORIDE 10 MG: 5 SOLUTION ORAL at 12:56

## 2021-01-01 RX ADMIN — PIPERACILLIN SODIUM AND TAZOBACTAM SODIUM 4.5 G: 4; .5 INJECTION, POWDER, LYOPHILIZED, FOR SOLUTION INTRAVENOUS at 13:35

## 2021-01-01 RX ADMIN — Medication 40 MG: at 08:18

## 2021-01-01 RX ADMIN — Medication 1 MCG/KG/HR: at 07:10

## 2021-01-01 RX ADMIN — PIPERACILLIN SODIUM AND TAZOBACTAM SODIUM 3.38 G: 3; .375 INJECTION, POWDER, LYOPHILIZED, FOR SOLUTION INTRAVENOUS at 18:07

## 2021-01-01 RX ADMIN — Medication 40 MG: at 08:38

## 2021-01-01 RX ADMIN — HEPARIN SODIUM 5000 UNITS: 5000 INJECTION INTRAVENOUS; SUBCUTANEOUS at 01:02

## 2021-01-01 RX ADMIN — VANCOMYCIN HYDROCHLORIDE 1500 MG: 5 INJECTION, POWDER, LYOPHILIZED, FOR SOLUTION INTRAVENOUS at 17:05

## 2021-01-01 RX ADMIN — OXYCODONE HYDROCHLORIDE 10 MG: 5 SOLUTION ORAL at 08:38

## 2021-01-01 RX ADMIN — Medication 0.6 MCG/KG/HR: at 06:02

## 2021-01-01 RX ADMIN — PIPERACILLIN SODIUM AND TAZOBACTAM SODIUM 4.5 G: 4; .5 INJECTION, POWDER, LYOPHILIZED, FOR SOLUTION INTRAVENOUS at 08:46

## 2021-01-01 RX ADMIN — PHENYLEPHRINE HYDROCHLORIDE 1.5 MCG/KG/MIN: 10 INJECTION INTRAVENOUS at 06:40

## 2021-01-01 RX ADMIN — HEPARIN SODIUM 5000 UNITS: 5000 INJECTION INTRAVENOUS; SUBCUTANEOUS at 20:42

## 2021-01-01 RX ADMIN — LIDOCAINE HYDROCHLORIDE 6 ML: 20 JELLY TOPICAL at 03:26

## 2021-01-01 RX ADMIN — METOPROLOL TARTRATE 5 MG: 5 INJECTION INTRAVENOUS at 17:10

## 2021-01-01 RX ADMIN — CALCIUM CHLORIDE 2 G: 100 INJECTION, SOLUTION INTRAVENOUS at 08:32

## 2021-01-01 RX ADMIN — HEPARIN SODIUM 5000 UNITS: 5000 INJECTION INTRAVENOUS; SUBCUTANEOUS at 08:23

## 2021-01-01 RX ADMIN — HEPARIN SODIUM 5000 UNITS: 5000 INJECTION INTRAVENOUS; SUBCUTANEOUS at 08:24

## 2021-01-01 RX ADMIN — ACETAZOLAMIDE 250 MG: 250 TABLET ORAL at 07:57

## 2021-01-01 RX ADMIN — HEPARIN SODIUM 5000 UNITS: 5000 INJECTION INTRAVENOUS; SUBCUTANEOUS at 06:04

## 2021-01-01 RX ADMIN — HEPARIN SODIUM 5000 UNITS: 5000 INJECTION INTRAVENOUS; SUBCUTANEOUS at 00:42

## 2021-01-01 RX ADMIN — FUROSEMIDE 20 MG: 10 INJECTION, SOLUTION INTRAVENOUS at 03:35

## 2021-01-01 RX ADMIN — Medication 0.4 MCG/KG/HR: at 02:29

## 2021-01-01 RX ADMIN — HEPARIN SODIUM 5000 UNITS: 5000 INJECTION INTRAVENOUS; SUBCUTANEOUS at 13:17

## 2021-01-01 RX ADMIN — AMIODARONE HYDROCHLORIDE 1 MG/MIN: 50 INJECTION, SOLUTION INTRAVENOUS at 17:46

## 2021-01-01 RX ADMIN — Medication 1 MCG/KG/HR: at 01:02

## 2021-01-01 RX ADMIN — PIPERACILLIN SODIUM AND TAZOBACTAM SODIUM 3.38 G: 3; .375 INJECTION, POWDER, LYOPHILIZED, FOR SOLUTION INTRAVENOUS at 07:05

## 2021-01-01 RX ADMIN — Medication 40 MG: at 08:27

## 2021-01-01 RX ADMIN — NOREPINEPHRINE BITARTRATE 4 MG/250 ML (16 MCG/ML) IN 0.9 % NACL IV: at 23:03

## 2021-01-01 RX ADMIN — MULTIVIT AND MINERALS-FERROUS GLUCONATE 9 MG IRON/15 ML ORAL LIQUID 15 ML: at 09:58

## 2021-01-01 RX ADMIN — Medication 1 MCG/KG/HR: at 16:08

## 2021-01-01 RX ADMIN — Medication 1 MCG/KG/HR: at 14:45

## 2021-01-01 RX ADMIN — PIPERACILLIN SODIUM AND TAZOBACTAM SODIUM 3.38 G: 3; .375 INJECTION, POWDER, LYOPHILIZED, FOR SOLUTION INTRAVENOUS at 01:47

## 2021-01-01 RX ADMIN — Medication 25 MCG: at 05:11

## 2021-01-01 RX ADMIN — METOPROLOL TARTRATE 12.5 MG: 25 TABLET, FILM COATED ORAL at 02:50

## 2021-01-01 RX ADMIN — PIPERACILLIN SODIUM AND TAZOBACTAM SODIUM 4.5 G: 4; .5 INJECTION, POWDER, LYOPHILIZED, FOR SOLUTION INTRAVENOUS at 14:12

## 2021-01-01 RX ADMIN — Medication 1 MCG/KG/HR: at 13:31

## 2021-01-01 RX ADMIN — INSULIN ASPART 1 UNITS: 100 INJECTION, SOLUTION INTRAVENOUS; SUBCUTANEOUS at 04:20

## 2021-01-01 RX ADMIN — FENTANYL CITRATE 50 MCG/HR: 50 INJECTION INTRAVENOUS at 05:13

## 2021-01-01 RX ADMIN — Medication 0.8 MCG/KG/HR: at 23:22

## 2021-01-01 RX ADMIN — CHLORHEXIDINE GLUCONATE 15 ML: 1.2 SOLUTION ORAL at 21:29

## 2021-01-01 RX ADMIN — FENTANYL CITRATE 50 MCG: 50 INJECTION, SOLUTION INTRAMUSCULAR; INTRAVENOUS at 00:35

## 2021-01-01 RX ADMIN — DULOXETINE 60 MG: 60 CAPSULE, DELAYED RELEASE ORAL at 04:53

## 2021-01-01 RX ADMIN — CALCIUM GLUCONATE 1 G: 98 INJECTION, SOLUTION INTRAVENOUS at 22:05

## 2021-01-01 RX ADMIN — AMIODARONE HYDROCHLORIDE 0.5 MG/MIN: 50 INJECTION, SOLUTION INTRAVENOUS at 23:15

## 2021-01-01 RX ADMIN — Medication 0.4 MCG/KG/HR: at 18:07

## 2021-01-01 RX ADMIN — CHLORHEXIDINE GLUCONATE 15 ML: 1.2 SOLUTION ORAL at 19:50

## 2021-01-01 RX ADMIN — METOPROLOL TARTRATE 5 MG: 1 INJECTION, SOLUTION INTRAVENOUS at 17:10

## 2021-01-01 RX ADMIN — ACETAZOLAMIDE 250 MG: 250 TABLET ORAL at 14:59

## 2021-01-01 RX ADMIN — OXYCODONE HYDROCHLORIDE 10 MG: 5 SOLUTION ORAL at 08:32

## 2021-01-01 RX ADMIN — OXYCODONE HYDROCHLORIDE 10 MG: 5 SOLUTION ORAL at 00:45

## 2021-01-01 RX ADMIN — HEPARIN SODIUM 5000 UNITS: 5000 INJECTION INTRAVENOUS; SUBCUTANEOUS at 00:26

## 2021-01-01 RX ADMIN — PIPERACILLIN SODIUM AND TAZOBACTAM SODIUM 4.5 G: 4; .5 INJECTION, POWDER, LYOPHILIZED, FOR SOLUTION INTRAVENOUS at 14:59

## 2021-01-01 RX ADMIN — ACETAZOLAMIDE 250 MG: 250 TABLET ORAL at 13:14

## 2021-01-01 RX ADMIN — HEPARIN SODIUM 5000 UNITS: 5000 INJECTION INTRAVENOUS; SUBCUTANEOUS at 17:24

## 2021-01-01 RX ADMIN — FENTANYL CITRATE 50 MCG: 50 INJECTION INTRAMUSCULAR; INTRAVENOUS at 01:59

## 2021-01-01 RX ADMIN — PANTOPRAZOLE SODIUM 40 MG: 40 INJECTION, POWDER, FOR SOLUTION INTRAVENOUS at 07:58

## 2021-01-01 RX ADMIN — FENTANYL CITRATE 25 MCG/HR: 50 INJECTION INTRAVENOUS at 13:17

## 2021-01-01 RX ADMIN — HEPARIN SODIUM 5000 UNITS: 5000 INJECTION INTRAVENOUS; SUBCUTANEOUS at 00:03

## 2021-01-01 RX ADMIN — SENNOSIDES 5 ML: 8.8 LIQUID ORAL at 13:13

## 2021-01-01 RX ADMIN — INSULIN ASPART 1 UNITS: 100 INJECTION, SOLUTION INTRAVENOUS; SUBCUTANEOUS at 21:19

## 2021-01-01 RX ADMIN — CHLORHEXIDINE GLUCONATE 15 ML: 1.2 SOLUTION ORAL at 07:57

## 2021-01-01 RX ADMIN — OXYCODONE HYDROCHLORIDE 10 MG: 5 SOLUTION ORAL at 00:26

## 2021-01-01 RX ADMIN — MULTIVIT AND MINERALS-FERROUS GLUCONATE 9 MG IRON/15 ML ORAL LIQUID 15 ML: at 08:23

## 2021-01-01 RX ADMIN — SODIUM CHLORIDE, POTASSIUM CHLORIDE, SODIUM LACTATE AND CALCIUM CHLORIDE 2000 ML: 600; 310; 30; 20 INJECTION, SOLUTION INTRAVENOUS at 00:24

## 2021-01-01 RX ADMIN — DULOXETINE 60 MG: 60 CAPSULE, DELAYED RELEASE ORAL at 17:08

## 2021-01-01 RX ADMIN — PIPERACILLIN SODIUM AND TAZOBACTAM SODIUM 4.5 G: 4; .5 INJECTION, POWDER, LYOPHILIZED, FOR SOLUTION INTRAVENOUS at 21:58

## 2021-01-01 RX ADMIN — PIPERACILLIN SODIUM AND TAZOBACTAM SODIUM 3.38 G: 3; .375 INJECTION, POWDER, LYOPHILIZED, FOR SOLUTION INTRAVENOUS at 08:24

## 2021-01-01 RX ADMIN — DEXTROSE MONOHYDRATE 50 ML: 500 INJECTION PARENTERAL at 11:43

## 2021-01-01 RX ADMIN — HEPARIN SODIUM 5000 UNITS: 5000 INJECTION INTRAVENOUS; SUBCUTANEOUS at 08:18

## 2021-01-01 RX ADMIN — HEPARIN SODIUM 5000 UNITS: 5000 INJECTION INTRAVENOUS; SUBCUTANEOUS at 16:06

## 2021-01-01 RX ADMIN — VANCOMYCIN HYDROCHLORIDE 1500 MG: 5 INJECTION, POWDER, LYOPHILIZED, FOR SOLUTION INTRAVENOUS at 22:30

## 2021-01-01 RX ADMIN — CHLORHEXIDINE GLUCONATE 15 ML: 1.2 SOLUTION ORAL at 20:50

## 2021-01-01 RX ADMIN — AMIODARONE HYDROCHLORIDE 0.5 MG/MIN: 50 INJECTION, SOLUTION INTRAVENOUS at 12:01

## 2021-01-01 RX ADMIN — Medication 1 MCG/KG/HR: at 03:08

## 2021-01-01 RX ADMIN — CALCIUM GLUCONATE 2 G: 20 INJECTION, SOLUTION INTRAVENOUS at 17:00

## 2021-01-01 RX ADMIN — FENTANYL CITRATE 75 MCG/HR: 50 INJECTION INTRAVENOUS at 03:34

## 2021-01-01 RX ADMIN — MIDAZOLAM HYDROCHLORIDE 2 MG/HR: 1 INJECTION, SOLUTION INTRAVENOUS at 17:23

## 2021-01-01 RX ADMIN — AMIODARONE HYDROCHLORIDE 400 MG: 200 TABLET ORAL at 21:29

## 2021-01-01 RX ADMIN — Medication 40 MG: at 08:33

## 2021-01-01 RX ADMIN — Medication 1 MCG/KG/HR: at 11:58

## 2021-01-01 RX ADMIN — Medication 25 MCG: at 11:15

## 2021-01-01 RX ADMIN — CHLORHEXIDINE GLUCONATE 15 ML: 1.2 SOLUTION ORAL at 20:24

## 2021-01-01 RX ADMIN — Medication 1 MCG/KG/HR: at 19:45

## 2021-01-01 RX ADMIN — HEPARIN SODIUM 5000 UNITS: 5000 INJECTION INTRAVENOUS; SUBCUTANEOUS at 06:43

## 2021-01-01 RX ADMIN — DULOXETINE 60 MG: 60 CAPSULE, DELAYED RELEASE ORAL at 03:59

## 2021-01-01 RX ADMIN — OXYCODONE HYDROCHLORIDE 10 MG: 5 SOLUTION ORAL at 01:02

## 2021-01-01 RX ADMIN — CHLORHEXIDINE GLUCONATE 15 ML: 1.2 SOLUTION ORAL at 08:27

## 2021-01-01 RX ADMIN — VANCOMYCIN HYDROCHLORIDE 2500 MG: 5 INJECTION, POWDER, LYOPHILIZED, FOR SOLUTION INTRAVENOUS at 00:54

## 2021-01-01 RX ADMIN — PIPERACILLIN SODIUM AND TAZOBACTAM SODIUM 4.5 G: 4; .5 INJECTION, POWDER, LYOPHILIZED, FOR SOLUTION INTRAVENOUS at 08:18

## 2021-01-01 RX ADMIN — Medication 0.03 MCG/KG/MIN: at 10:09

## 2021-01-01 RX ADMIN — OXYCODONE HYDROCHLORIDE 10 MG: 5 SOLUTION ORAL at 00:03

## 2021-01-01 RX ADMIN — AMIODARONE HYDROCHLORIDE 400 MG: 200 TABLET ORAL at 20:50

## 2021-01-01 RX ADMIN — MULTIVIT AND MINERALS-FERROUS GLUCONATE 9 MG IRON/15 ML ORAL LIQUID 15 ML: at 08:33

## 2021-01-01 RX ADMIN — SODIUM BICARBONATE 50 MEQ: 84 INJECTION INTRAVENOUS at 22:03

## 2021-01-01 RX ADMIN — FUROSEMIDE 20 MG: 10 INJECTION, SOLUTION INTRAVENOUS at 20:25

## 2021-01-01 RX ADMIN — DULOXETINE 60 MG: 60 CAPSULE, DELAYED RELEASE ORAL at 16:57

## 2021-01-01 RX ADMIN — ACETAZOLAMIDE 250 MG: 250 TABLET ORAL at 08:32

## 2021-01-01 RX ADMIN — ACETAZOLAMIDE 250 MG: 250 TABLET ORAL at 18:23

## 2021-01-01 RX ADMIN — OXYCODONE HYDROCHLORIDE 10 MG: 5 SOLUTION ORAL at 11:58

## 2021-01-01 RX ADMIN — Medication 0.4 MCG/KG/HR: at 13:13

## 2021-01-01 RX ADMIN — Medication 40 MG: at 08:23

## 2021-01-01 RX ADMIN — OXYCODONE HYDROCHLORIDE 10 MG: 5 SOLUTION ORAL at 19:40

## 2021-01-01 RX ADMIN — OXYCODONE HYDROCHLORIDE 10 MG: 5 SOLUTION ORAL at 11:51

## 2021-01-01 RX ADMIN — DOCUSATE SODIUM 100 MG: 50 LIQUID ORAL at 20:50

## 2021-01-01 RX ADMIN — OXYCODONE HYDROCHLORIDE 10 MG: 5 SOLUTION ORAL at 03:34

## 2021-01-01 RX ADMIN — Medication 25 MCG: at 01:10

## 2021-01-01 RX ADMIN — Medication 40 MG: at 07:56

## 2021-01-01 RX ADMIN — ACETAZOLAMIDE 250 MG: 250 TABLET ORAL at 17:28

## 2021-01-01 RX ADMIN — SODIUM CHLORIDE, POTASSIUM CHLORIDE, SODIUM LACTATE AND CALCIUM CHLORIDE: 600; 310; 30; 20 INJECTION, SOLUTION INTRAVENOUS at 10:04

## 2021-01-01 RX ADMIN — PIPERACILLIN SODIUM AND TAZOBACTAM SODIUM 3.38 G: 3; .375 INJECTION, POWDER, LYOPHILIZED, FOR SOLUTION INTRAVENOUS at 01:09

## 2021-01-01 RX ADMIN — SODIUM CHLORIDE 20 ML/HR: 9 INJECTION, SOLUTION INTRAVENOUS at 21:43

## 2021-01-01 RX ADMIN — AMIODARONE HYDROCHLORIDE 400 MG: 200 TABLET ORAL at 08:32

## 2021-01-01 RX ADMIN — SODIUM CHLORIDE, POTASSIUM CHLORIDE, SODIUM LACTATE AND CALCIUM CHLORIDE: 600; 310; 30; 20 INJECTION, SOLUTION INTRAVENOUS at 02:40

## 2021-01-01 RX ADMIN — OXYCODONE HYDROCHLORIDE 10 MG: 5 SOLUTION ORAL at 16:06

## 2021-01-01 RX ADMIN — PIPERACILLIN SODIUM AND TAZOBACTAM SODIUM 4.5 G: 4; .5 INJECTION, POWDER, LYOPHILIZED, FOR SOLUTION INTRAVENOUS at 21:29

## 2021-01-01 RX ADMIN — CHLORHEXIDINE GLUCONATE 15 ML: 1.2 SOLUTION ORAL at 08:18

## 2021-01-01 ASSESSMENT — ACTIVITIES OF DAILY LIVING (ADL)
ADLS_ACUITY_SCORE: 24
ADLS_ACUITY_SCORE: 26
ADLS_ACUITY_SCORE: 20
ADLS_ACUITY_SCORE: 24
ADLS_ACUITY_SCORE: 22
CURRENT_FUNCTION: PREPARING MEALS REQUIRES ASSISTANCE
CURRENT_FUNCTION: BATHING REQUIRES ASSISTANCE
ADLS_ACUITY_SCORE: 24
ADLS_ACUITY_SCORE: 24
ADLS_ACUITY_SCORE: 26
ADLS_ACUITY_SCORE: 24
ADLS_ACUITY_SCORE: 26
ADLS_ACUITY_SCORE: 24
ADLS_ACUITY_SCORE: 24
ADLS_ACUITY_SCORE: 18
ADLS_ACUITY_SCORE: 24
ADLS_ACUITY_SCORE: 18
ADLS_ACUITY_SCORE: 24
ADLS_ACUITY_SCORE: 26
ADLS_ACUITY_SCORE: 20
ADLS_ACUITY_SCORE: 24
ADLS_ACUITY_SCORE: 26
ADLS_ACUITY_SCORE: 24
ADLS_ACUITY_SCORE: 24
ADLS_ACUITY_SCORE: 20
ADLS_ACUITY_SCORE: 24
ADLS_ACUITY_SCORE: 26
ADLS_ACUITY_SCORE: 24
CURRENT_FUNCTION: TRANSPORTATION REQUIRES ASSISTANCE
ADLS_ACUITY_SCORE: 22
ADLS_ACUITY_SCORE: 28
ADLS_ACUITY_SCORE: 22
ADLS_ACUITY_SCORE: 26
ADLS_ACUITY_SCORE: 14
ADLS_ACUITY_SCORE: 26
ADLS_ACUITY_SCORE: 24
ADLS_ACUITY_SCORE: 26
ADLS_ACUITY_SCORE: 26
ADLS_ACUITY_SCORE: 24
ADLS_ACUITY_SCORE: 22
ADLS_ACUITY_SCORE: 28
ADLS_ACUITY_SCORE: 24
ADLS_ACUITY_SCORE: 24
ADLS_ACUITY_SCORE: 14
ADLS_ACUITY_SCORE: 24
ADLS_ACUITY_SCORE: 26
ADLS_ACUITY_SCORE: 24
ADLS_ACUITY_SCORE: 14
ADLS_ACUITY_SCORE: 18
ADLS_ACUITY_SCORE: 22
ADLS_ACUITY_SCORE: 22
ADLS_ACUITY_SCORE: 24
CURRENT_FUNCTION: HOUSEWORK REQUIRES ASSISTANCE
ADLS_ACUITY_SCORE: 24
ADLS_ACUITY_SCORE: 22
ADLS_ACUITY_SCORE: 18
ADLS_ACUITY_SCORE: 26
ADLS_ACUITY_SCORE: 26
CURRENT_FUNCTION: LAUNDRY REQUIRES ASSISTANCE
ADLS_ACUITY_SCORE: 24
CURRENT_FUNCTION: SHOPPING REQUIRES ASSISTANCE
ADLS_ACUITY_SCORE: 18
ADLS_ACUITY_SCORE: 24
CURRENT_FUNCTION: HOUSEWORK REQUIRES ASSISTANCE
ADLS_ACUITY_SCORE: 24
ADLS_ACUITY_SCORE: 20
ADLS_ACUITY_SCORE: 24
ADLS_ACUITY_SCORE: 26
ADLS_ACUITY_SCORE: 26
CURRENT_FUNCTION: TRANSPORTATION REQUIRES ASSISTANCE
ADLS_ACUITY_SCORE: 18
ADLS_ACUITY_SCORE: 22
ADLS_ACUITY_SCORE: 28
ADLS_ACUITY_SCORE: 24
ADLS_ACUITY_SCORE: 20
ADLS_ACUITY_SCORE: 22
ADLS_ACUITY_SCORE: 24
ADLS_ACUITY_SCORE: 24
ADLS_ACUITY_SCORE: 18
ADLS_ACUITY_SCORE: 24
ADLS_ACUITY_SCORE: 22
ADLS_ACUITY_SCORE: 28
ADLS_ACUITY_SCORE: 24
ADLS_ACUITY_SCORE: 20
ADLS_ACUITY_SCORE: 22
ADLS_ACUITY_SCORE: 24
ADLS_ACUITY_SCORE: 22
ADLS_ACUITY_SCORE: 14
ADLS_ACUITY_SCORE: 22
ADLS_ACUITY_SCORE: 18
ADLS_ACUITY_SCORE: 24
ADLS_ACUITY_SCORE: 24
ADLS_ACUITY_SCORE: 28
ADLS_ACUITY_SCORE: 26
ADLS_ACUITY_SCORE: 18
ADLS_ACUITY_SCORE: 24
ADLS_ACUITY_SCORE: 22
ADLS_ACUITY_SCORE: 18
ADLS_ACUITY_SCORE: 24
ADLS_ACUITY_SCORE: 24
ADLS_ACUITY_SCORE: 26
CURRENT_FUNCTION: LAUNDRY REQUIRES ASSISTANCE
ADLS_ACUITY_SCORE: 22
ADLS_ACUITY_SCORE: 24
ADLS_ACUITY_SCORE: 24
ADLS_ACUITY_SCORE: 26
ADLS_ACUITY_SCORE: 24
ADLS_ACUITY_SCORE: 26
ADLS_ACUITY_SCORE: 24
ADLS_ACUITY_SCORE: 26
ADLS_ACUITY_SCORE: 18
ADLS_ACUITY_SCORE: 24
ADLS_ACUITY_SCORE: 26
ADLS_ACUITY_SCORE: 24
ADLS_ACUITY_SCORE: 18
ADLS_ACUITY_SCORE: 18
CURRENT_FUNCTION: BATHING REQUIRES ASSISTANCE
ADLS_ACUITY_SCORE: 26
ADLS_ACUITY_SCORE: 24
ADLS_ACUITY_SCORE: 22
ADLS_ACUITY_SCORE: 24
ADLS_ACUITY_SCORE: 22
ADLS_ACUITY_SCORE: 18
ADLS_ACUITY_SCORE: 26
ADLS_ACUITY_SCORE: 24
ADLS_ACUITY_SCORE: 20
ADLS_ACUITY_SCORE: 20
ADLS_ACUITY_SCORE: 18
ADLS_ACUITY_SCORE: 24
ADLS_ACUITY_SCORE: 28
ADLS_ACUITY_SCORE: 28
ADLS_ACUITY_SCORE: 22
ADLS_ACUITY_SCORE: 22
ADLS_ACUITY_SCORE: 24
ADLS_ACUITY_SCORE: 22
ADLS_ACUITY_SCORE: 24
ADLS_ACUITY_SCORE: 24
ADLS_ACUITY_SCORE: 22
ADLS_ACUITY_SCORE: 26
ADLS_ACUITY_SCORE: 18
ADLS_ACUITY_SCORE: 18
ADLS_ACUITY_SCORE: 24
ADLS_ACUITY_SCORE: 24
ADLS_ACUITY_SCORE: 18
ADLS_ACUITY_SCORE: 24
ADLS_ACUITY_SCORE: 26
CURRENT_FUNCTION: SHOPPING REQUIRES ASSISTANCE
ADLS_ACUITY_SCORE: 28
ADLS_ACUITY_SCORE: 24
ADLS_ACUITY_SCORE: 24
ADLS_ACUITY_SCORE: 28
CURRENT_FUNCTION: PREPARING MEALS REQUIRES ASSISTANCE
ADLS_ACUITY_SCORE: 26
ADLS_ACUITY_SCORE: 26
ADLS_ACUITY_SCORE: 22
ADLS_ACUITY_SCORE: 26
ADLS_ACUITY_SCORE: 22
ADLS_ACUITY_SCORE: 24
ADLS_ACUITY_SCORE: 24
ADLS_ACUITY_SCORE: 18
ADLS_ACUITY_SCORE: 24
ADLS_ACUITY_SCORE: 28
ADLS_ACUITY_SCORE: 18
ADLS_ACUITY_SCORE: 18
ADLS_ACUITY_SCORE: 24
ADLS_ACUITY_SCORE: 24

## 2021-01-01 ASSESSMENT — ANXIETY QUESTIONNAIRES
GAD7 TOTAL SCORE: 0
IF YOU CHECKED OFF ANY PROBLEMS ON THIS QUESTIONNAIRE, HOW DIFFICULT HAVE THESE PROBLEMS MADE IT FOR YOU TO DO YOUR WORK, TAKE CARE OF THINGS AT HOME, OR GET ALONG WITH OTHER PEOPLE: NOT DIFFICULT AT ALL
7. FEELING AFRAID AS IF SOMETHING AWFUL MIGHT HAPPEN: NOT AT ALL
5. BEING SO RESTLESS THAT IT IS HARD TO SIT STILL: NOT AT ALL
GAD7 TOTAL SCORE: 0
3. WORRYING TOO MUCH ABOUT DIFFERENT THINGS: NOT AT ALL
2. NOT BEING ABLE TO STOP OR CONTROL WORRYING: NOT AT ALL
1. FEELING NERVOUS, ANXIOUS, OR ON EDGE: NOT AT ALL
6. BECOMING EASILY ANNOYED OR IRRITABLE: NOT AT ALL

## 2021-01-01 ASSESSMENT — MIFFLIN-ST. JEOR
SCORE: 1905.53
SCORE: 1964.63
SCORE: 2082.53
SCORE: 1887.49
SCORE: 1935.57
SCORE: 1961.63

## 2021-01-01 ASSESSMENT — ENCOUNTER SYMPTOMS
ABDOMINAL PAIN: 0
DIZZINESS: 0
EYE PAIN: 0
ARTHRALGIAS: 0
DIARRHEA: 0
MYALGIAS: 1
FEVER: 0
SORE THROAT: 0
CONSTIPATION: 1
HEMATOCHEZIA: 0
HEADACHES: 0
WEAKNESS: 1
JOINT SWELLING: 0
HEARTBURN: 0
NAUSEA: 0
COUGH: 1
PALPITATIONS: 0
PARESTHESIAS: 0
HEMATURIA: 0
NERVOUS/ANXIOUS: 0
DYSURIA: 0
FREQUENCY: 1
SHORTNESS OF BREATH: 1
BREAST MASS: 0
CHILLS: 0

## 2021-01-01 ASSESSMENT — PATIENT HEALTH QUESTIONNAIRE - PHQ9
5. POOR APPETITE OR OVEREATING: NOT AT ALL
SUM OF ALL RESPONSES TO PHQ QUESTIONS 1-9: 3

## 2021-01-06 DIAGNOSIS — I10 HYPERTENSION GOAL BP (BLOOD PRESSURE) < 140/90: ICD-10-CM

## 2021-01-06 RX ORDER — METOPROLOL SUCCINATE 50 MG/1
TABLET, EXTENDED RELEASE ORAL
Qty: 90 TABLET | Refills: 0 | Status: SHIPPED | OUTPATIENT
Start: 2021-01-06 | End: 2021-01-01

## 2021-01-06 NOTE — TELEPHONE ENCOUNTER
Filled per FM protocol. Patient has appointment scheduled with provider on 2/25/2021.    LILLIANA ShirleyN, RN  [facility] [facility]

## 2021-01-16 ENCOUNTER — MYC REFILL (OUTPATIENT)
Dept: FAMILY MEDICINE | Facility: CLINIC | Age: 56
End: 2021-01-16

## 2021-01-16 DIAGNOSIS — G35 MS (MULTIPLE SCLEROSIS) (H): ICD-10-CM

## 2021-01-16 DIAGNOSIS — G89.4 CHRONIC PAIN SYNDROME: ICD-10-CM

## 2021-01-16 RX ORDER — OXYCODONE HYDROCHLORIDE 15 MG/1
15 TABLET ORAL EVERY 6 HOURS PRN
Qty: 120 TABLET | Refills: 0 | Status: SHIPPED | OUTPATIENT
Start: 2021-01-18 | End: 2021-01-01

## 2021-01-18 ENCOUNTER — TELEPHONE (OUTPATIENT)
Dept: FAMILY MEDICINE | Facility: CLINIC | Age: 56
End: 2021-01-18

## 2021-01-18 NOTE — TELEPHONE ENCOUNTER
Received another PA request from [facility] for Oxycodone 15MG via fax. Prior Auth request left in Dr. Hassan's box.    Kanika FARRELL  Patient Representative - Prior [place]

## 2021-01-18 NOTE — TELEPHONE ENCOUNTER
Reason for Call:  Form, our goal is to have forms completed with 72 hours, however, some forms may require a visit or additional information.    Type of letter, form or note:  medical    Who is the form from?: [facility] (if other please explain)    Where did the form come from: form was faxed in    What clinic location was the form placed at?: [place]    Where the form was placed: Dr Hassan Box/Folder    What number is listed as a contact on the form?: [phone]       Additional comments:     Call taken on 1/18/2021 at 1:12 PM by Cheryl Abdalla

## 2021-01-19 NOTE — TELEPHONE ENCOUNTER
Another fax received from [facility] noting PA needed for   oxyCODONE IR (ROXICODONE) 15 MG tablet 120 tablet 0 1/18/2021  No   Sig - Route: Take 1 tablet (15 mg) by mouth every 6 hours as needed for moderate to severe pain Limit 4 tablet(s) per day. - Oral       Routing to PA team to inquire if any information as of today.    Anival RIOS RN   [facility] Triage

## 2021-01-19 NOTE — TELEPHONE ENCOUNTER
Reason for Call:  Form, our goal is to have forms completed with 72 hours, however, some forms may require a visit or additional information.    Type of letter, form or note:  Form for Prior Auth    Who is the form from?: [facility] (if other please explain)    Where did the form come from: form was faxed in    What clinic location was the form placed at?: [place]    Where the form was placed: Dr Hassan Box/Folder    What number is listed as a contact on the form?: [phone]         Call taken on 1/18/2021 at 6:03 PM by Magdalena Ascencio

## 2021-01-19 NOTE — TELEPHONE ENCOUNTER
Encounter was already routed to [facility] for a prior auth    Melissa Benitez MA

## 2021-01-21 NOTE — TELEPHONE ENCOUNTER
Prior Authorization Approval    Authorization Effective Date: 12/22/2020  Authorization Expiration Date: 1/21/2022  Medication: oxyCODONE IR (ROXICODONE) 15 MG tablet  Approved Dose/Quantity:   Reference #: [id number]   Insurance Company: [employer] - Phone [phone] Fax [phone]  Expected CoPay:       CoPay Card Available:      Foundation Assistance Needed:    Which Pharmacy is filling the prescription (Not needed for infusion/clinic administered): [id number] - [place], [place] - [address] AT [facility]  Pharmacy Notified: Yes  Patient Notified: Yes, **Instructed pharmacy to notify patient when script is ready to /ship.**

## 2021-01-21 NOTE — TELEPHONE ENCOUNTER
PA Initiation    Medication: oxyCODONE IR (ROXICODONE) 15 MG tablet  Insurance Company: [employer] - Phone [phone] Fax [phone]  Pharmacy Filling the Rx: [id number] - [place], [place] - [address] AT [facility]  Filling Pharmacy Phone: [phone]  Filling Pharmacy Fax: [phone]  Start Date: 1/21/2021

## 2021-02-19 NOTE — PROGRESS NOTES
[facility]    Telephone visit  - [phone]    Subjective    Amanda Richardson is a 55 year old female who is being evaluated via a billable telephone visit.      Chief Complaint   Patient presents with     Recheck Medication     MS - stable - exacerbation, mild 2 months ago - getting new - Dr Vigil in past    Chronic opioids - LBP - Rt hip fracture    CSA - 11/23/2020  UDS - 11/24/2020 - as expected    PHQ 1/29/2020 8/21/2020 12/23/2020   PHQ-9 Total Score 6 3 3   Q9: Thoughts of better off dead/self-harm past 2 weeks Not at all Not at all Not at all     REJI-7 SCORE 1/29/2020 8/21/2020 12/23/2020   Total Score 1 0 0     NHL - T cell lymphoma - oncology dismissed her - no issues    Diabetes Follow-up    How often are you checking your blood sugar? Two times daily  Blood sugar testing frequency justification:  Uncontrolled diabetes  What time of day are you checking your blood sugars (select all that apply)?  Before and after meals  Have you had any blood sugars above 200?  No  Have you had any blood sugars below 70?  No    What symptoms do you notice when your blood sugar is low?  Other: Fatigue    What concerns do you have today about your diabetes? None     Do you have any of these symptoms? (Select all that apply)  No numbness or tingling in feet.  No redness, sores or blisters on feet.  No complaints of excessive thirst.  No reports of blurry vision.  No significant changes to weight.    Have you had a diabetic eye exam in the last 12 months? No    Stable 85 to 144    Lab Results   Component Value Date    A1C 6.4 08/03/2019    A1C 6.3 03/17/2019    A1C 6.6 02/09/2019    A1C 6.4 01/30/2019    A1C 6.8 06/20/2018             Hyperlipidemia Follow-Up      Are you regularly taking any medication or supplement to lower your cholesterol?   Yes- Atorvastatin    Are you having muscle aches or other side effects that you think could be caused by your cholesterol lowering medication?  No     Recent  Labs   Lab Test 08/03/19  0930 03/18/19  0625 06/20/18  1529 06/20/18  1529 12/08/14  1756 12/08/14  1756   CHOL 124  --   --  141   < > 172   HDL 36*  --   --  33*   < > 36*   LDL 71  --   --  82   < > 97   TRIG 84 218*   < > 130   < > 197*   CHOLHDLRATIO  --   --   --   --   --  4.8    < > = values in this interval not displayed.     Hypertension Follow-up      Do you check your blood pressure regularly outside of the clinic? No     Are you following a low salt diet? Yes    Are your blood pressures ever more than 140 on the top number (systolic) OR more   than 90 on the bottom number (diastolic), for example 140/90? No    BP Readings from Last 3 Encounters:   01/29/20 118/86   01/15/20 (!) 144/79   12/18/19 (!) 143/66     Creatinine   Date Value Ref Range Status   08/03/2019 0.47 (L) 0.52 - 1.04 mg/dL Final     GFR Estimate   Date Value Ref Range Status   08/03/2019 >90 >60 mL/min/[1.73_m2] Final     Comment:     Non  GFR Calc  Starting 12/18/2018, serum creatinine based estimated GFR (eGFR) will be   calculated using the Chronic Kidney Disease Epidemiology Collaboration   (CKD-EPI) equation.       Lymphedema  - stable with stockings      How many servings of fruits and vegetables do you eat daily?  4 or more    On average, how many sweetened beverages do you drink each day (Examples: soda, juice, sweet tea, etc.  Do NOT count diet or artificially sweetened beverages)?   0    How many days per week do you exercise enough to make your heart beat faster? 3 or less    How many minutes a day do you exercise enough to make your heart beat faster? 9 or less    How many days per week do you miss taking your medication? 0        PMH    Past Medical History:   Diagnosis Date     Abnormality of gait 07/27/2012     Arrhythmia     with sepsis     Chronic pain     [facility] - yearly, next in summer 2018     CKD (chronic kidney disease) stage 1, GFR 90 ml/min or greater     kidney stones     Colon polyps  01/2015    tubular adenomas x 2     Depressive disorder      Gallstone 06/11/2012     Hyperlipidemia LDL goal <70      Hypertension goal BP (blood pressure) < 140/90      Leukocytosis 06/11/2012     Moderate depressive episode (H)      MS (multiple sclerosis) (H) 2003    Dr Vigil/[facility] - [facility]     Multiple sclerosis (H)      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 Cystoscopy 2. LEFT retrograde pyelogram 3. LEFT JJ stent placement 4. <1hr physician fluoroscopy time;  Surgeon: Epifanio Sapp MD;  Location: [facility] OR     COMBINED CYSTOSCOPY, RETROGRADES, URETEROSCOPY, LASER HOLMIUM LITHOTRIPSY URETER(S), INSERT STENT Left 3/15/2019    Procedure: Cystoscopy, left ureteral stent exchange, left retrograde pyelogram, interpretation of fluoroscopic images, left ureteroscopy with holmium lithotripsy and stone basketing, 22 modifier for difficult lengthy case.;  Surgeon: Mayito Chauhan,  MD;  Location: [facility] OR     CYSTOSCOPY       CYSTOSCOPY, REMOVE STENT(S), COMBINED Left 3/27/2019    Procedure: Flexible cystoscopy with left ureteral stent removal;  Surgeon: Mayito Chauhan MD;  Location: [facility] OR     FUSION LUMBAR ANTERIOR, FUSION LUMBAR POSTERIOR TWO LEVELS, COMBINED  10/17/2013    lumbar fusion - Dr Floyd     INSERT PUMP BACLOFEN  04/2017    intrathecal baclofen pump implantation     IRRIGATION AND DEBRIDEMENT LOWER EXTREMITY, COMBINED Right 2015    Right ankle I&D d/t infection     OPEN REDUCTION INTERNAL FIXATION ANKLE  5/15    Right Bimalleolar ankle fx ORIF     OPEN REDUCTION INTERNAL FIXATION ANKLE Right 11/2015    Revision due to spasms pulling screws out of ankle     SINUS SURGERY  2011    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     XR LUMBAR EPIDURAL INJECTION INCL IMAGING  3/14    Left L4-5 Epidural Dr Winter       Medications    Current Outpatient Medications   Medication Sig Dispense Refill     amitriptyline (ELAVIL) 100 MG tablet Take 1 tablet (100 mg) by mouth At Bedtime 90 tablet 3     aspirin (ASA) 81 MG chewable tablet Take 1 tablet (81 mg) by mouth daily       atorvastatin (LIPITOR) 10 MG tablet Take 1 tablet (10 mg) by mouth daily 90 tablet 3     DULoxetine (CYMBALTA) 60 MG capsule Take 1 capsule (60 mg) by mouth 2 times daily 180 capsule 3     Hesperidin-Diosmin 250-650 MG TABS 2 tabs daily per wound care 180 tablet 3     losartan-hydrochlorothiazide (HYZAAR) 50-12.5 MG tablet TAKE 1 TABLET BY MOUTH DAILY 90 tablet 3     metFORMIN (GLUCOPHAGE) 500 MG tablet TAKE 1 TABLET(500 MG) BY MOUTH DAILY WITH DINNER 90 tablet 3     metoprolol succinate ER (TOPROL-XL) 50 MG 24 hr tablet Take 1 tablet (50 mg) by mouth daily 90 tablet 3     Multiple Vitamin (MULTI-VITAMIN PO) Take 1 tablet by mouth daily        omega-3 fatty acids (FISH OIL) 1200 MG capsule Take 1 capsule by mouth daily.       oxyCODONE IR (ROXICODONE) 15 MG tablet Take 1 tablet (15 mg) by mouth every 6  hours as needed for moderate to severe pain Limit 4 tablet(s) per day. 120 tablet 0     vitamin D3 (VITAMIN D3) 1000 units (25 mcg) tablet Take 1 tablet (1,000 Units) by mouth daily       acetaminophen (TYLENOL) 325 MG tablet Take 2 tablets (650 mg) by mouth every 4 hours as needed for mild pain       albuterol (PROVENTIL) (2.5 MG/3ML) 0.083% neb solution Take 1 vial (2.5 mg) by nebulization once for 1 dose 25 vial 1     baclofen (LIORESAL) 10 MG tablet 10 mg by Per G Tube route 3 times daily as needed Doesn't take oral - it's in her pump       blood glucose (ACCU-CHEK KEL) test strip Use to test blood sugar 1 times daily or as directed. 100 strip 5     blood glucose (NO BRAND SPECIFIED) lancets standard Use to test blood sugar 1 times daily or as directed. 100 each 5     blood glucose calibration (NO BRAND SPECIFIED) solution Use to calibrate blood glucose monitor as needed as directed. 1 each 0     blood glucose monitoring (ACCU-CHEK KEL PLUS) meter device kit Use to test blood sugar 1 times daily or as directed. 1 kit 0     blood glucose monitoring (ACCU-CHEK MULTICLIX) lancets Use to test blood sugar 1 times daily or as directed. 100 each 5     naloxone (NARCAN) 4 MG/0.1ML nasal spray Spray 1 spray (4 mg) into one nostril alternating nostrils once as needed for opioid reversal Every 2-3 minutes until patient responsive or EMS arrives 0.2 mL 0     senna-docusate (SENOKOT-S/PERICOLACE) 8.6-50 MG tablet Take 2 tablets by mouth daily as needed for constipation 180 tablet 0       Allergies    Lisinopril, Cyclobenzaprine, and Flexeril [cyclobenzaprine hcl]    Family History    Family History   Problem Relation Age of Onset     C.A.D. Father         with CHF      Cancer Father         ? unsure type - abdominal      Hypertension Mother      Thyroid Disease Mother         goiter      Breast Cancer Sister 58     Thyroid Disease Son      Cancer - colorectal No family hx of        Social History    Social History      Socioeconomic History     Marital status:      Spouse name: Fernando     Number of children: 3     Years of education: 14     Highest education level: Not on file   Occupational History     Employer: UNEMPLOYED   Social Needs     Financial resource strain: Not on file     Food insecurity     Worry: Not on file     Inability: Not on file     Transportation needs     Medical: Not on file     Non-medical: Not on file   Tobacco Use     Smoking status: Former Smoker     Packs/day: 0.50     Years: 0.00     Pack years: 0.00     Types: Cigarettes     Quit date: 2013     Years since quittin.5     Smokeless tobacco: Never Used     Tobacco comment: since age 17   Substance and Sexual Activity     Alcohol use: No     Drug use: No     Sexual activity: Yes     Partners: Male   Lifestyle     Physical activity     Days per week: Not on file     Minutes per session: Not on file     Stress: Not on file   Relationships     Social connections     Talks on phone: Not on file     Gets together: Not on file     Attends Sabianist service: Not on file     Active member of club or organization: Not on file     Attends meetings of clubs or organizations: Not on file     Relationship status: Not on file     Intimate partner violence     Fear of current or ex partner: Not on file     Emotionally abused: Not on file     Physically abused: Not on file     Forced sexual activity: Not on file   Other Topics Concern     Parent/sibling w/ CABG, MI or angioplasty before 65F 55M? No      Service Not Asked     Blood Transfusions Not Asked     Caffeine Concern Yes     Comment: occas     Occupational Exposure Not Asked     Hobby Hazards Not Asked     Sleep Concern Not Asked     Stress Concern Not Asked     Weight Concern Not Asked     Special Diet Not Asked     Back Care Not Asked     Exercise Yes     Comment: as able     Bike Helmet Not Asked     Seat Belt Yes     Self-Exams Not Asked   Social History Narrative     Not on file        Reviewed and updated as needed this visit by Provider                   Review of Systems     CONSTITUTIONAL: NEGATIVE for fever, chills, change in weight  INTEGUMENTARY/SKIN: NEGATIVE for worrisome rashes, moles or lesions  EYES: NEGATIVE for vision changes or irritation  ENT/MOUTH: NEGATIVE for ear, mouth and throat problems  RESP: NEGATIVE for significant cough or SOB  BREAST: NEGATIVE for masses, tenderness or discharge  CV: NEGATIVE for chest pain, palpitations or peripheral edema  GI: NEGATIVE for nausea, abdominal pain, heartburn, or change in bowel habits  : NEGATIVE for frequency, dysuria, or hematuria  MUSCULOSKELETAL: NEGATIVE for significant arthralgias or myalgia  NEURO: NEGATIVE for weakness, dizziness or paresthesias  ENDOCRINE: NEGATIVE for temperature intolerance, skin/hair changes  HEME: NEGATIVE for bleeding problems  PSYCHIATRIC: NEGATIVE for changes in mood or affect    Objective    Reported vitals:  [facility] 12/11/2011      healthy, alert and no distress  Psych: Alert and oriented times 3; coherent speech, normal   rate and volume, able to articulate logical thoughts, able   to abstract reason, no tangential thoughts, no hallucinations   or delusions  Her affect is normal     RESP: No cough, no audible wheezing, able to talk in full sentences  Remainder of exam unable to be completed due to telephone visits    Diagnostic Test Results:  Labs reviewed in Epic         Assessment/Plan:      ICD-10-CM    1. MS (multiple sclerosis) (H)  G35 amitriptyline (ELAVIL) 100 MG tablet   2. Chronic pain syndrome  G89.4 amitriptyline (ELAVIL) 100 MG tablet   3. Lumbar radiculopathy  M54.16 amitriptyline (ELAVIL) 100 MG tablet   4. S/P right hip fracture  Z87.81    5. Controlled substance agreement signed  Z79.899    6. Pain medication agreement- signed Nov 20,2012  Z02.89    7. 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 T-cell lymphoma (H)  C85.90    9. Type 2 diabetes mellitus  with stage 1 chronic kidney disease, without long-term current use of insulin (H)  E11.22     N18.1    10. Type 2 diabetes, HbA1c goal < 7% (H)  E11.9    11. CKD (chronic kidney disease) stage 1, GFR 90 ml/min or greater  N18.1    12. Other proteinuria  R80.8    13. Hyperlipidemia LDL goal <70  E78.5 atorvastatin (LIPITOR) 10 MG tablet   14. Hypertension goal BP (blood pressure) < 140/90  I10 metoprolol succinate ER (TOPROL-XL) 50 MG 24 hr tablet   15. Lymphedema  I89.0    16. Moderate depressive episode (H)  F32.1 amitriptyline (ELAVIL) 100 MG tablet   17. Morbid obesity (H)  E66.01    18. Encounter for long-term current use of medication  Z79.899    19. Medication monitoring encounter  Z51.81      Visits every 3 months  Fasting labs soon  Med refills  Dr Griffin - Baclofen  MS neurology consult    Return in about 3 months (around 5/25/2021), or if symptoms worsen or fail to improve, for Medication Recheck Visit, Follow Up Chronic.    Phone call duration:  22 minutes           Julius Hassan MD, FAAFP     [facility]  [address] [place] [id number]  [email or web address]   Office: [phone]  Fax: [phone]  Pager: [phone]

## 2021-02-26 NOTE — TELEPHONE ENCOUNTER
PT wants all prescriptions to be sent to [facility]. Needs Rx sent yesterday to [facility].  -Micki Pickering

## 2021-03-15 NOTE — TELEPHONE ENCOUNTER
oxyCODONE IR (ROXICODONE) 15 MG tablet 120 tablet 0 2/17/2021  No   Sig - Route: Take 1 tablet (15 mg) by mouth every 6 hours as needed for moderate to severe pain Limit 4 tablet(s) per day. - Oral   Sent to pharmacy as: oxyCODONE HCl 15 MG Oral Tablet (ROXICODONE)   Class: E-Prescribe       Last office visit: 1/29/2020 with prescribing provider:    Future Office Visit:          Encounter-Level CSA - 02/12/2018:    Controlled Substance Agreement - Scan on 2/28/2018 10:12 AM: CONTROLLED SUBSTANCE AGREEMENT     Encounter-Level CSA - 06/09/2016:    Controlled Substance Agreement - Scan on 6/17/2016 10:49 AM: CONTROLLED SUBSTANCE AGREEMENT     Encounter-Level CSA - 04/03/2015:    Controlled Substance Agreement - Scan on 4/12/2015 10:28 AM: Controlled Medication Agreement 04/03/15     Patient-Level CSA:    Controlled Substance Agreement - Opioid - Scan on 11/25/2020  2:43 PM       Routing refill request to provider for review/approval because:  Drug not on the [facility] refill protocol       Cordelia Stevens RN, BSN  [facility]

## 2021-04-07 NOTE — TELEPHONE ENCOUNTER
PHARMACY CHANGE  Refilled per RN Protocol.       Ivanna Kelly RN  [facility]

## 2021-04-14 NOTE — TELEPHONE ENCOUNTER
BP Readings from Last 3 Encounters:   01/29/20 118/86   01/15/20 (!) 144/79   12/18/19 (!) 143/66     Creatinine   Date Value Ref Range Status   08/03/2019 0.47 (L) 0.52 - 1.04 mg/dL Final     Potassium   Date Value Ref Range Status   08/03/2019 4.0 3.4 - 5.3 mmol/L Final     Sodium   Date Value Ref Range Status   08/03/2019 139 133 - 144 mmol/L Final     Routing refill request to provider for review/approval because:  Labs not current:  Creatinine, potassium, sodium  BP not done within past year          Ivanna Kelly RN  [facility]

## 2021-04-15 NOTE — TELEPHONE ENCOUNTER
Outpatient Medication Detail   Disp Refills Start End AISHA   oxyCODONE IR (ROXICODONE) 15 MG tablet 120 tablet 0 3/19/2021  No   Sig - Route: Take 1 tablet (15 mg) by mouth every 6 hours as needed for moderate to severe pain Limit 4 tablet(s) per day. - Oral     Problem List Complete:    Yes    Last Office Visit with [facility] primary care provider: 2/25/2021    Future Office visit:     Controlled substance agreement:   Encounter-Level CSA - 02/12/2018:    Controlled Substance Agreement - Scan on 2/28/2018 10:12 AM: CONTROLLED SUBSTANCE AGREEMENT     Encounter-Level CSA - 06/09/2016:    Controlled Substance Agreement - Scan on 6/17/2016 10:49 AM: CONTROLLED SUBSTANCE AGREEMENT     Encounter-Level CSA - 04/03/2015:    Controlled Substance Agreement - Scan on 4/12/2015 10:28 AM: Controlled Medication Agreement 04/03/15     Patient-Level CSA:    Controlled Substance Agreement - Opioid - Scan on 11/25/2020  2:43 PM         Last Urine Drug Screen:   Pain Drug SCR UR W RPTD Meds   Date Value Ref Range Status   11/24/2020 FINAL  Final     Comment:     (Note)  ====================================================================  [facility]RE COMP DRUG ANALYSIS,UR  ====================================================================  Test                             Result       Flag       Units        Drug Present and Declared for Prescription Verification   Oxycodone                      2278         EXPECTED   ng/mg creat   Oxymorphone                    1150         EXPECTED   ng/mg creat   Noroxycodone                   4606         EXPECTED   ng/mg creat   Noroxymorphone                 956          EXPECTED   ng/mg creat    Sources of oxycodone are scheduled prescription medications.    Oxymorphone, noroxycodone, and noroxymorphone are expected    metabolites of oxycodone. Oxymorphone is also available as a    scheduled prescription medication.  Drug Present not Declared for Prescription Verification   Salicylate                      PRESENT      UNEXPECTED               Metoprolol                     PRESENT      UNEXPECTED              Drug Absent but Declared for Prescription Verification   Baclofen                       Not Detected UNEXPECTED               Duloxetine                     Not Detected UNEXPECTED              ====================================================================  Test                      Result    Flag   Units      Ref Range        Creatinine              96               mg/dL      >=20            ====================================================================  Declared Medications:  The flagging and interpretation on this report are based on the  following declared medications.  Unexpected results may arise from  inaccuracies in the declared medications.  **Note: The testing scope of this panel includes these medications:  Baclofen  Duloxetine (Cymbalta)  Oxycodone  ====================================================================  For clinical consultation, please call [phone].  ====================================================================  Analysis performed by [employer] [place] [address]     , No results found for: COMDAT, No results found for: THC13, PCP13, COC13, MAMP13, OPI13, AMP13, BZO13, TCA13, MTD13, BAR13, OXY13, PPX13, BUP13     Processing:  Rx to be electronically transmitted to pharmacy by provider     [email or web address]    Routing refill request to provider for review/approval because:  Drug not on the [facility] refill protocol         Ivanna Kelly RN  [facility]

## 2021-04-15 NOTE — TELEPHONE ENCOUNTER
Recent telephone visit on chronic conditions with PCP.Phone call duration:  22 minutes    Return in about 3 months (around 5/25/2021), or if symptoms worsen or fail to improve, for Medication Recheck Visit, Follow Up Chronic.     Refilled X 90 days follow up in May.        Montserrat Wyatt, FNP-BC

## 2021-04-23 NOTE — TELEPHONE ENCOUNTER
Attempt # 1    Called # [phone]     Left a non detailed VM to call back at [phone] and ask for any available Triage Nurse.    Need to clarify prescription refill request for rosuvastatin, as patient has never been prescribed this, she is on atorvastatin.    Elva Sanders RN  [facility]

## 2021-04-23 NOTE — TELEPHONE ENCOUNTER
Patient returning call    Stated she is taking atorvastatin 10mg daily. Is NOT taking rosuvastatin.       Ivanna Kelly RN  [facility]

## 2021-06-09 NOTE — PROGRESS NOTES
Amanda is a 56 year old who is being evaluated via a billable telephone visit.      What phone number would you like to be contacted at? [phone]  How would you like to obtain your AVS? MyChart    Assessment & Plan       ICD-10-CM    1. MS (multiple sclerosis) (H)  G35 REVIEW OF HEALTH MAINTENANCE PROTOCOL ORDERS   2. Chronic pain syndrome  G89.4 REVIEW OF HEALTH MAINTENANCE PROTOCOL ORDERS   3. Lumbar radiculopathy  M54.16 REVIEW OF HEALTH MAINTENANCE PROTOCOL ORDERS   4. S/P right hip fracture  Z87.81 REVIEW OF HEALTH MAINTENANCE PROTOCOL ORDERS   5. Other proteinuria  R80.8 REVIEW OF HEALTH MAINTENANCE PROTOCOL ORDERS   6. Controlled substance agreement signed  Z79.899 REVIEW OF HEALTH MAINTENANCE PROTOCOL ORDERS   7. Type 2 diabetes mellitus with stage 1 chronic kidney disease, without long-term current use of insulin (H)  E11.22 REVIEW OF HEALTH MAINTENANCE PROTOCOL ORDERS    N18.1    8. Type 2 diabetes, HbA1c goal < 7% (H)  E11.9 REVIEW OF HEALTH MAINTENANCE PROTOCOL ORDERS   9. CKD (chronic kidney disease) stage 1, GFR 90 ml/min or greater  N18.1 REVIEW OF HEALTH MAINTENANCE PROTOCOL ORDERS   10. Hypertension goal BP (blood pressure) < 140/90  I10 REVIEW OF HEALTH MAINTENANCE PROTOCOL ORDERS   11. Hyperlipidemia LDL goal <70  E78.5 REVIEW OF HEALTH MAINTENANCE PROTOCOL ORDERS   12. Moderate depressive episode (H)  F32.1 REVIEW OF HEALTH MAINTENANCE PROTOCOL ORDERS   13. T-cell lymphoma (H)  C85.90 REVIEW OF HEALTH MAINTENANCE PROTOCOL ORDERS   14. Lymphedema  I89.0 REVIEW OF HEALTH MAINTENANCE PROTOCOL ORDERS   15. Morbid obesity (H)  E66.01 REVIEW OF HEALTH MAINTENANCE PROTOCOL ORDERS   16. Medication monitoring encounter  Z51.81 REVIEW OF HEALTH MAINTENANCE PROTOCOL ORDERS       Discussed treatment/modality options, including risk and benefits, she desires:    1) fasting labs soon    2) PHQ/REJI    3) continue current cares    4) visits every 3 months    5) CSA/UDS in 11/21    All diagnosis above  "reviewed and noted above, otherwise stable.      See EpicCare orders for further details.      Return in about 3 months (around 9/9/2021) for Medication Recheck Visit, Follow Up Chronic.    No LOS data to display    Doing chart review, history and exam, documentation and further activities as noted.           Julius Hassan MD, FAAFP     [facility]  [address] [place] [id number]  [email or web address]   Office: [phone]  Fax: [phone]  Pager: [phone]       Jada Patel is a 56 year old who presents for the following health issues     MS - Follow up pending COVID/Mobility    Chronic pain - notes \"pretty good\" - uses mainly for LBP/Hip, some neck/shoulders - CSA/UDS completed 11/20    PHQ 1/29/2020 8/21/2020 12/23/2020   PHQ-9 Total Score 6 3 3   Q9: Thoughts of better off dead/self-harm past 2 weeks Not at all Not at all Not at all     REJI-7 SCORE 1/29/2020 8/21/2020 12/23/2020   Total Score 1 0 0     Recently completed COVID Vaccine - Pfizer - mainly tired, especially after second    T Cell Lymphoma - no issues    Diabetes Follow-up    Range 87 to 182 - average fasting 125    Lab Results   Component Value Date    A1C 6.4 08/03/2019    A1C 6.3 03/17/2019    A1C 6.6 02/09/2019    A1C 6.4 01/30/2019    A1C 6.8 06/20/2018     How often are you checking your blood sugar? Two times daily  Blood sugar testing frequency justification:  Uncontrolled diabetes  What time of day are you checking your blood sugars (select all that apply)?  Before meals  Have you had any blood sugars above 200?  No  Have you had any blood sugars below 70?  No    What symptoms do you notice when your blood sugar is low?  None    What concerns do you have today about your diabetes? None     Do you have any of these symptoms? (Select all that apply)  Numbness in feet    Have you had a diabetic eye exam in the last 12 months? " No            Hyperlipidemia Follow-Up      Are you regularly taking any medication or supplement to lower your cholesterol?   Yes- see med list    Are you having muscle aches or other side effects that you think could be caused by your cholesterol lowering medication?  No     Recent Labs   Lab Test 08/03/19  0930 03/18/19  0625 06/20/18  1529 06/20/18  1529 12/08/14  1756 12/08/14  1756   CHOL 124  --   --  141   < > 172   HDL 36*  --   --  33*   < > 36*   LDL 71  --   --  82   < > 97   TRIG 84 218*   < > 130   < > 197*   CHOLHDLRATIO  --   --   --   --   --  4.8    < > = values in this interval not displayed.     Hypertension Follow-up      Do you check your blood pressure regularly outside of the clinic? No     Are you following a low salt diet? Yes    Are your blood pressures ever more than 140 on the top number (systolic) OR more   than 90 on the bottom number (diastolic), for example 140/90? No    BP Readings from Last 3 Encounters:   01/29/20 118/86   01/15/20 (!) 144/79   12/18/19 (!) 143/66     Creatinine   Date Value Ref Range Status   08/03/2019 0.47 (L) 0.52 - 1.04 mg/dL Final     GFR Estimate   Date Value Ref Range Status   08/03/2019 >90 >60 mL/min/[1.73_m2] Final     Comment:     Non  GFR Calc  Starting 12/18/2018, serum creatinine based estimated GFR (eGFR) will be   calculated using the Chronic Kidney Disease Epidemiology Collaboration   (CKD-EPI) equation.         How many servings of fruits and vegetables do you eat daily?  4 or more    On average, how many sweetened beverages do you drink each day (Examples: soda, juice, sweet tea, etc.  Do NOT count diet or artificially sweetened beverages)?   0    How many days per week do you exercise enough to make your heart beat faster? 3 or less wheel chair bound    How many minutes a day do you exercise enough to make your heart beat faster? 9 or less    How many days per week do you miss taking your medication? 0    Review of Systems    CONSTITUTIONAL: NEGATIVE for fever, chills, change in weight  INTEGUMENTARY/SKIN: NEGATIVE for worrisome rashes, moles or lesions  EYES: NEGATIVE for vision changes or irritation  ENT/MOUTH: NEGATIVE for ear, mouth and throat problems  RESP: NEGATIVE for significant cough or SOB  CV: NEGATIVE for chest pain, palpitations or peripheral edema  GI: NEGATIVE for nausea, abdominal pain, heartburn, or change in bowel habits  : NEGATIVE for frequency, dysuria, or hematuria  MUSCULOSKELETAL: NEGATIVE for significant arthralgias or myalgia  NEURO: NEGATIVE for weakness, dizziness or paresthesias  ENDOCRINE: NEGATIVE for temperature intolerance, skin/hair changes  HEME: NEGATIVE for bleeding problems  PSYCHIATRIC: NEGATIVE for changes in mood or affect      Objective         Vitals:  No vitals were obtained today due to virtual visit.    Physical Exam   healthy, alert and no distress  PSYCH: Alert and oriented times 3; coherent speech, normal   rate and volume, able to articulate logical thoughts, able   to abstract reason, no tangential thoughts, no hallucinations   or delusions  Her affect is normal  RESP: No cough, no audible wheezing, able to talk in full sentences  Remainder of exam unable to be completed due to telephone visits    Labs reviewed    Phone call duration: 18 minutes

## 2021-06-14 NOTE — TELEPHONE ENCOUNTER
oxyCODONE IR (ROXICODONE) 15 MG tablet 120 tablet 0 5/15/2021  No   Sig - Route: Take 1 tablet (15 mg) by mouth every 6 hours as needed for moderate to severe pain Limit 4 tablet(s) per day. - Oral   Sent to pharmacy as: oxyCODONE HCl 15 MG Oral Tablet (ROXICODONE       Last office visit: 1/29/2020 with prescribing provider:    Future Office Visit:          Encounter-Level CSA - 02/12/2018:    Controlled Substance Agreement - Scan on 2/28/2018 10:12 AM: CONTROLLED SUBSTANCE AGREEMENT     Encounter-Level CSA - 06/09/2016:    Controlled Substance Agreement - Scan on 6/17/2016 10:49 AM: CONTROLLED SUBSTANCE AGREEMENT     Encounter-Level CSA - 04/03/2015:    Controlled Substance Agreement - Scan on 4/12/2015 10:28 AM: Controlled Medication Agreement 04/03/15     Patient-Level CSA:    Controlled Substance Agreement - Opioid - Scan on 11/25/2020  2:43 PM       Routing refill request to provider for review/approval because:  Drug not on the [facility] refill protocol     Cordelia Stevens RN, BSN  [facility]

## 2021-07-13 NOTE — TELEPHONE ENCOUNTER
oxyCODONE IR (ROXICODONE) 15 MG tablet 120 tablet 0 6/14/2021  No   Sig - Route: Take 1 tablet (15 mg) by mouth every 6 hours as needed for moderate to severe pain Limit 4 tablet(s) per day. - Oral       Last office visit: 1/29/2020 with prescribing provider:   Future Office Visit:          Encounter-Level CSA - 02/12/2018:    Controlled Substance Agreement - Scan on 2/28/2018 10:12 AM: CONTROLLED SUBSTANCE AGREEMENT     Encounter-Level CSA - 06/09/2016:    Controlled Substance Agreement - Scan on 6/17/2016 10:49 AM: CONTROLLED SUBSTANCE AGREEMENT     Encounter-Level CSA - 04/03/2015:    Controlled Substance Agreement - Scan on 4/12/2015 10:28 AM: Controlled Medication Agreement 04/03/15     Patient-Level CSA:    Controlled Substance Agreement - Opioid - Scan on 11/25/2020  2:43 PM           Routing refill request to provider for review/approval because:  Drug not on the [facility] refill protocol     Cordelia Stevens RN, BSN  [facility]

## 2021-07-15 NOTE — TELEPHONE ENCOUNTER
Left non detailed message. Due for wellness exam w/ fasting labs after September.     Josse [place]

## 2021-08-04 NOTE — TELEPHONE ENCOUNTER
Patient needs refill of lipitor, please send to [facility] ( not [facility]).       Jolene Polo

## 2021-08-05 NOTE — TELEPHONE ENCOUNTER
Routing to RV refill for protocol    Anival RIOS RN   [facility]

## 2021-08-06 NOTE — PROGRESS NOTES
"[facility]  [place]    SUBJECTIVE    CC: Amanda Richardson is an 56 year old woman who presents for preventive health visit.     Patient has been advised of split billing requirements and indicates understanding: Yes  Healthy Habits:     In general, how would you rate your overall health?  Poor    Frequency of exercise:  1 day/week    Duration of exercise:  Less than 15 minutes    Do you usually eat at least 4 servings of fruit and vegetables a day, include whole grains    & fiber and avoid regularly eating high fat or \"junk\" foods?  Yes    Taking medications regularly:  Yes    Medication side effects:  None    Ability to successfully perform activities of daily living:  Transportation requires assistance, shopping requires assistance, preparing meals requires assistance, housework requires assistance, bathing requires assistance and laundry requires assistance    Home Safety:  No safety concerns identified    Hearing Impairment:  Difficult to understand a speaker at a public meeting or Yazidi service and no hearing concerns    In the past 6 months, have you been bothered by leaking of urine? Yes    In general, how would you rate your overall mental or emotional health?  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 Two times daily  Blood sugar testing frequency justification:  Patient modifying lifestyle changes (diet, exercise) with blood sugars  What time of day are you checking your blood sugars (select all that apply)?  Before meals  Have you had any blood sugars above 200?  No  Have you had any blood sugars below 70?  No    What symptoms do you " notice when your blood sugar is low?  None    What concerns do you have today about your diabetes? None     Do you have any of these symptoms? (Select all that apply)  No numbness or tingling in feet.  No redness, sores or blisters on feet.  No complaints of excessive thirst.  No reports of blurry vision.  No significant changes to weight.    Have you had a diabetic eye exam in the last 12 months? No    Range 88 to 172  Average morning fasting 112    Lab Results   Component Value Date    A1C 8.5 08/06/2021    A1C 6.4 08/03/2019    A1C 6.3 03/17/2019    A1C 6.6 02/09/2019    A1C 6.4 01/30/2019    A1C 6.8 06/20/2018             Hyperlipidemia Follow-Up      Are you regularly taking any medication or supplement to lower your cholesterol?   Yes- lipitor    Are you having muscle aches or other side effects that you think could be caused by your cholesterol lowering medication?  No     Recent Labs   Lab Test 08/03/19  0930 03/18/19  0625 06/20/18  1529 12/08/14  1756   CHOL 124  --  141 172   HDL 36*  --  33* 36*   LDL 71  --  82 97   TRIG 84 218* 130 197*   CHOLHDLRATIO  --   --   --  4.8     CKD 1/Hypertension Follow-up      Do you check your blood pressure regularly outside of the clinic? No     Are you following a low salt diet? Yes    Are your blood pressures ever more than 140 on the top number (systolic) OR more   than 90 on the bottom number (diastolic), for example 140/90? No    BP Readings from Last 3 Encounters:   08/06/21 122/72   01/29/20 118/86   01/15/20 (!) 144/79     Creatinine   Date Value Ref Range Status   08/06/2021 0.56 0.52 - 1.04 mg/dL Final   08/03/2019 0.47 (L) 0.52 - 1.04 mg/dL Final     GFR Estimate   Date Value Ref Range Status   08/06/2021 >90 >60 mL/min/1.73m2 Final     Comment:     As of July 11, 2021, eGFR is calculated by the CKD-EPI creatinine equation, without race adjustment. eGFR can be influenced by muscle mass, exercise, and diet. The reported eGFR is an estimation only and is only  applicable if the renal function is stable.   2019 >90 >60 mL/min/[1.73_m2] Final     Comment:     Non  GFR Calc  Starting 2018, serum creatinine based estimated GFR (eGFR) will be   calculated using the Chronic Kidney Disease Epidemiology Collaboration   (CKD-EPI) equation.       Depression/Anxiety - minimal    Chronic pain - stable on current meds - pain clinic follow up    Today's PHQ-2 Score:   PHQ-2 (  [employer]) 8/3/2021   Q1: Little interest or pleasure in doing things 1   Q2: Feeling down, depressed or hopeless 0   PHQ-2 Score 1   Q1: Little interest or pleasure in doing things Several days   Q2: Feeling down, depressed or hopeless Not at all   PHQ-2 Score 1       Abuse: Current or Past (Physical, Sexual or Emotional) - No  Do you feel safe in your environment? Yes        Social History     Tobacco Use     Smoking status: Former Smoker     Packs/day: 0.50     Years: 31.00     Pack years: 15.50     Types: Cigarettes     Quit date: 2013     Years since quittin.0     Smokeless tobacco: Never Used     Tobacco comment: since age 17   Substance Use Topics     Alcohol use: No         Alcohol Use 8/3/2021   Prescreen: >3 drinks/day or >7 drinks/week? No   Prescreen: >3 drinks/day or >7 drinks/week? -     Reviewed orders with patient.  Reviewed health maintenance and updated orders accordingly - Yes    Breast Cancer Screening:    FHS-7:   Breast CA Risk Assessment (FHS-7) 2021   Did any of your first-degree relatives have breast or ovarian cancer? Yes   Did any of your relatives have bilateral breast cancer? No     Sister with Breast Cancer    Mammogram Screening: Recommended annual mammography  Pertinent mammograms are reviewed under the imaging tab.    History of abnormal Pap smear: No Hx - declines - Rt Hip fracture - chronic - no hx of abnormal pap's    PAP / HPV Latest Ref Rng & Units 2012   PAP (Historical) - NIL NIL   HPV16 NEG Negative -   HPV18 NEG  Negative -   HRHPV NEG Negative -     Health Maintenance     Colonoscopy:  due   FIT:  given              Mammogram:  due              PAP:  Per OB/Gyn   DEXA:  ordered    Health Maintenance Due   Topic Date Due     EYE EXAM  Never done     HEPATITIS B IMMUNIZATION (1 of 3 - Risk 3-dose series) Never done     DEXA  06/26/2017     HPV TEST  05/02/2019     PAP  05/02/2019     MAMMO SCREENING  07/09/2019     DIABETIC FOOT EXAM  01/29/2021     Pneumococcal Vaccine: Pediatrics (0 to 5 Years) and At-Risk Patients (6 to 64 Years) (3 of 4 - PCV13) 01/29/2021       Current Problem List    Patient Active Problem List   Diagnosis     MS (multiple sclerosis) (H)     Non Hodgkin's lymphoma (H)     Leukocytosis     Gallstone     Hypertension goal BP (blood pressure) < 140/90     Spinal stenosis of lumbar region, unspecified whether neurogenic claudication present     Abnormality of gait     Pain medication agreement- signed Nov 20,2012     Lumbar radiculopathy     Colon polyps     Neuralgia, neuritis, and radiculitis, unspecified     Encounter for long-term current use of medication     Health Care Home     Moderate depressive episode (H)     Leg muscle spasm     Chronic pain syndrome     Controlled substance agreement signed     T-cell lymphoma (H)     Type 2 diabetes, HbA1c goal < 7% (H)     Hyperlipidemia LDL goal <70     CKD (chronic kidney disease) stage 1, GFR 90 ml/min or greater     Type 2 diabetes mellitus with stage 1 chronic kidney disease, without long-term current use of insulin (H)     Morbid obesity (H)     Acute hypercapnic respiratory failure (H)     Sepsis (H)     S/P right hip fracture     Lymphedema     Other proteinuria       Past Medical History    Past Medical History:   Diagnosis Date     Abnormality of gait 07/27/2012     Arrhythmia     with sepsis     Chronic pain     [facility] - yearly, next in summer 2018     CKD (chronic kidney disease) stage 1, GFR 90 ml/min or greater     kidney stones      Colon polyps 01/2015    tubular adenomas x 2     Depressive disorder      Gallstone 06/11/2012     Hyperlipidemia LDL goal <70      Hypertension goal BP (blood pressure) < 140/90      Leukocytosis 06/11/2012     Moderate depressive episode (H)      MS (multiple sclerosis) (H) 2003    Dr Vigil/Gaby - [facility]     Multiple sclerosis (H)      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 Cystoscopy 2. LEFT retrograde pyelogram 3. LEFT JJ stent placement 4. <1hr physician fluoroscopy time;  Surgeon: Epifanio Sapp MD;  Location: [facility] OR     COMBINED CYSTOSCOPY, RETROGRADES, URETEROSCOPY, LASER HOLMIUM LITHOTRIPSY URETER(S), INSERT STENT Left 3/15/2019    Procedure: Cystoscopy, left ureteral stent exchange, left retrograde pyelogram, interpretation of fluoroscopic images, left ureteroscopy with holmium lithotripsy and stone basketing, 22 modifier for difficult lengthy case.;   Surgeon: Mayito Chauhan MD;  Location: [facility] OR     CYSTOSCOPY       CYSTOSCOPY, REMOVE STENT(S), COMBINED Left 3/27/2019    Procedure: Flexible cystoscopy with left ureteral stent removal;  Surgeon: Mayito Chauhan MD;  Location: [facility] OR     FUSION LUMBAR ANTERIOR, FUSION LUMBAR POSTERIOR TWO LEVELS, COMBINED  10/17/2013    lumbar fusion - Dr Floyd     INSERT PUMP BACLOFEN  04/2017    intrathecal baclofen pump implantation     IRRIGATION AND DEBRIDEMENT LOWER EXTREMITY, COMBINED Right 2015    Right ankle I&D d/t infection     OPEN REDUCTION INTERNAL FIXATION ANKLE  5/15    Right Bimalleolar ankle fx ORIF     OPEN REDUCTION INTERNAL FIXATION ANKLE Right 11/2015    Revision due to spasms pulling screws out of ankle     SINUS SURGERY  2011    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     XR LUMBAR EPIDURAL INJECTION INCL IMAGING  3/14    Left L4-5 Epidural Dr Winter       Current Medications    Current Outpatient Medications   Medication Sig Dispense Refill     acetaminophen (TYLENOL) 325 MG tablet Take 2 tablets (650 mg) by mouth every 4 hours as needed for mild pain       albuterol (PROVENTIL) (2.5 MG/3ML) 0.083% neb solution Take 1 vial (2.5 mg) by nebulization once for 1 dose 25 vial 1     amitriptyline (ELAVIL) 100 MG tablet Take 1 tablet (100 mg) by mouth At Bedtime 90 tablet 3     aspirin (ASA) 81 MG chewable tablet Take 1 tablet (81 mg) by mouth daily       atorvastatin (LIPITOR) 10 MG tablet Take 1 tablet (10 mg) by mouth daily 90 tablet 3     baclofen (LIORESAL) 10 MG tablet 10 mg by Per G Tube route 3 times daily as needed Doesn't take oral - it's in her pump       blood glucose (ACCU-CHEK KEL) test strip Use to test blood sugar 1 times daily or as directed. 100 strip 5     blood glucose (NO BRAND SPECIFIED) lancets standard Use to test blood sugar 1 times daily or as directed. 100 each 5     blood glucose calibration (NO BRAND SPECIFIED) solution Use to calibrate blood glucose  monitor as needed as directed. 1 each 0     blood glucose monitoring (ACCU-CHEK KEL PLUS) meter device kit Use to test blood sugar 1 times daily or as directed. 1 kit 0     blood glucose monitoring (ACCU-CHEK MULTICLIX) lancets Use to test blood sugar 1 times daily or as directed. 100 each 5     DULoxetine (CYMBALTA) 60 MG capsule Take 1 capsule (60 mg) by mouth 2 times daily 180 capsule 3     Hesperidin-Diosmin 250-650 MG TABS 2 tabs daily per wound care 180 tablet 3     losartan-hydrochlorothiazide (HYZAAR) 50-12.5 MG tablet Take 1 tablet by mouth daily 90 tablet 3     metFORMIN (GLUCOPHAGE) 500 MG tablet 1 Tabs QAM and 2 Tabs  tablet 3     metoprolol succinate ER (TOPROL-XL) 50 MG 24 hr tablet Take 1 tablet (50 mg) by mouth daily 90 tablet 3     Multiple Vitamin (MULTI-VITAMIN PO) Take 1 tablet by mouth daily        naloxone (NARCAN) 4 MG/0.1ML nasal spray Spray 1 spray (4 mg) into one nostril alternating nostrils once as needed for opioid reversal Every 2-3 minutes until patient responsive or EMS arrives 0.2 mL 0     omega-3 fatty acids (FISH OIL) 1200 MG capsule Take 1 capsule by mouth daily.       [START ON 8/13/2021] oxyCODONE IR (ROXICODONE) 15 MG tablet Take 1 tablet (15 mg) by mouth every 6 hours as needed for moderate to severe pain Limit 4 tablet(s) per day. 120 tablet 0     senna-docusate (SENOKOT-S/PERICOLACE) 8.6-50 MG tablet Take 2 tablets by mouth daily as needed for constipation 180 tablet 0     vitamin D3 (VITAMIN D3) 1000 units (25 mcg) tablet Take 1 tablet (1,000 Units) by mouth daily       atorvastatin (LIPITOR) 10 MG tablet Take 1 tablet (10 mg) by mouth daily 90 tablet 3       Allergies    Allergies   Allergen Reactions     Lisinopril Angioedema     Lip swelling     Flexeril [Cyclobenzaprine Hcl] Other (See Comments)     confusion       Immunizations    Immunization History   Administered Date(s) Administered     COVID-19,PF,Pfizer 04/09/2021, 05/04/2021     Flu, Unspecified  12/10/2009     Influenza (H1N1) 12/10/2009     Influenza (IIV3) PF 12/10/2009, 2012, 2014, 10/01/2020     Influenza Quad, Recombinant, p-free (RIV4) 2020     Influenza Vaccine IM > 6 months Valent IIV4 10/21/2013, 2014, 2018     Influenza Vaccine, 6+MO IM (QUADRIVALENT W/PRESERVATIVES) 2017     Pneumococcal 23 valent 2011, 2020     TDAP Vaccine (Adacel) 2018     Tdap (Adacel,Boostrix) 2008     Zoster vaccine recombinant adjuvanted (SHINGRIX) 2019, 2020       Family History    Family History   Problem Relation Age of Onset     C.A.D. Father         with CHF      Cancer Father         ? unsure type - abdominal      Hypertension Mother      Thyroid Disease Mother         goiter      Breast Cancer Sister 58     Thyroid Disease Son      Cancer Paternal Grandfather      Cancer - colorectal No family hx of        Social History    Social History     Socioeconomic History     Marital status:      Spouse name: Fernando     Number of children: 3     Years of education: 14     Highest education level: Not on file   Occupational History     Employer: UNEMPLOYED   Tobacco Use     Smoking status: Former Smoker     Packs/day: 0.50     Years: 31.00     Pack years: 15.50     Types: Cigarettes     Quit date: 2013     Years since quittin.0     Smokeless tobacco: Never Used     Tobacco comment: since age 17   Vaping Use     Vaping Use: Never used   Substance and Sexual Activity     Alcohol use: No     Drug use: No     Sexual activity: Yes     Partners: Male   Other Topics Concern     Parent/sibling w/ CABG, MI or angioplasty before 65F 55M? No      Service Not Asked     Blood Transfusions Not Asked     Caffeine Concern Yes     Comment: occas     Occupational Exposure Not Asked     Hobby Hazards Not Asked     Sleep Concern Not Asked     Stress Concern Not Asked     Weight Concern Not Asked     Special Diet Not Asked     Back Care Not Asked      Exercise Yes     Comment: as able     Bike Helmet Not Asked     Seat Belt Yes     Self-Exams Not Asked   Social History Narrative     Not on file     Social Determinants of Health     Financial Resource Strain:      Difficulty of Paying Living Expenses:    Food Insecurity:      Worried About Running Out of Food in the Last Year:      Ran Out of Food in the Last Year:    Transportation Needs:      Lack of Transportation (Medical):      Lack of Transportation (Non-Medical):    Physical Activity:      Days of Exercise per Week:      Minutes of Exercise per Session:    Stress:      Feeling of Stress :    Social Connections:      Frequency of Communication with Friends and Family:      Frequency of Social Gatherings with Friends and Family:      Attends Buddhism Services:      Active Member of Clubs or Organizations:      Attends Club or Organization Meetings:      Marital Status:    Intimate Partner Violence:      Fear of Current or Ex-Partner:      Emotionally Abused:      Physically Abused:      Sexually Abused:        ROS    CONSTITUTIONAL: NEGATIVE for fever, chills, change in weight  INTEGUMENTARY/SKIN: NEGATIVE for worrisome rashes, moles or lesions  EYES: NEGATIVE for vision changes or irritation  ENT/MOUTH: NEGATIVE for ear, mouth and throat problems  RESP: NEGATIVE for significant cough or SOB  BREAST: NEGATIVE for masses, tenderness or discharge  CV: NEGATIVE for chest pain, palpitations or peripheral edema  GI: NEGATIVE for nausea, abdominal pain, heartburn, or change in bowel habits  : NEGATIVE for frequency, dysuria, or hematuria  MUSCULOSKELETAL: NEGATIVE for significant arthralgias or myalgia  NEURO: NEGATIVE for weakness, dizziness or paresthesias  ENDOCRINE: NEGATIVE for temperature intolerance, skin/hair changes  HEME: NEGATIVE for bleeding problems  PSYCHIATRIC: NEGATIVE for changes in mood or affect    OBJECTIVE    /72   Pulse 96   Temp 98.4  F (36.9  C)   Resp 20   LMP 12/11/2011    SpO2 91%   Breastfeeding No   EXAM:  GENERAL: healthy, alert and no distress  EYES: Eyes grossly normal to inspection, PERRL and conjunctivae and sclerae normal  HENT: ear canals and TM's normal, nose and mouth without ulcers or lesions  NECK: no adenopathy, no asymmetry, masses, or scars and thyroid normal to palpation  RESP: lungs clear to auscultation - no rales, rhonchi or wheezes  BREAST: per OB/Gyn  CV: regular rate and rhythm, normal S1 S2, no S3 or S4, no murmur, click or rub, no peripheral edema and peripheral pulses strong  ABDOMEN: soft, nontender, no hepatosplenomegaly, no masses and bowel sounds normal   (female): per OB/Gyn  MS: no gross musculoskeletal defects noted, no edema  SKIN: no suspicious lesions or rashes  NEURO: at baseline, lower ext paralysis, mentation intact and speech normal  PSYCH: mentation appears normal, affect normal/bright  LYMPH: no cervical, supraclavicular, axillary, or inguinal adenopathy  Diabetic foot exam: at baseline, no acute changes    DIAGNOSTICS/PROCEDURES    Pending    ASSESSMENT      ICD-10-CM    1. Routine general medical examination at a health care facility  Z00.00 UA reflex to Microscopic and Culture     Urine Microscopic Exam     Urine Culture   2. MS (multiple sclerosis) (H)  G35 Comprehensive metabolic panel     CBC with platelets     amitriptyline (ELAVIL) 100 MG tablet     oxyCODONE IR (ROXICODONE) 15 MG tablet     Wheelchair Order for DME - ONLY FOR DME     ESR: Erythrocyte sedimentation rate     CRP, inflammation     ESR: Erythrocyte sedimentation rate     CRP, inflammation   3. T-cell lymphoma (H)  C85.90 Comprehensive metabolic panel     CBC with platelets     Wheelchair Order for DME - ONLY FOR DME     ESR: Erythrocyte sedimentation rate     CRP, inflammation     ESR: Erythrocyte sedimentation rate     CRP, inflammation   4. Type 2 diabetes mellitus with stage 1 chronic kidney disease, without long-term current use of insulin (H)  E11.22  Comprehensive metabolic panel    N18.1 Lipid panel reflex to direct LDL Fasting     Hemoglobin A1c     atorvastatin (LIPITOR) 10 MG tablet     losartan-hydrochlorothiazide (HYZAAR) 50-12.5 MG tablet     metFORMIN (GLUCOPHAGE) 500 MG tablet     Wheelchair Order for DME - ONLY FOR DME     CANCELED: UA reflex to Microscopic and Culture   5. Type 2 diabetes, HbA1c goal < 7% (H)  E11.9 Comprehensive metabolic panel     Lipid panel reflex to direct LDL Fasting     Hemoglobin A1c     atorvastatin (LIPITOR) 10 MG tablet     losartan-hydrochlorothiazide (HYZAAR) 50-12.5 MG tablet     metFORMIN (GLUCOPHAGE) 500 MG tablet     Wheelchair Order for DME - ONLY FOR DME   6. CKD (chronic kidney disease) stage 1, GFR 90 ml/min or greater  N18.1 Comprehensive metabolic panel     Wheelchair Order for DME - ONLY FOR DME     UA reflex to Microscopic and Culture     Urine Microscopic Exam     Urine Culture     CANCELED: UA reflex to Microscopic and Culture   7. Hypertension goal BP (blood pressure) < 140/90  I10 Comprehensive metabolic panel     losartan-hydrochlorothiazide (HYZAAR) 50-12.5 MG tablet     metoprolol succinate ER (TOPROL-XL) 50 MG 24 hr tablet     CANCELED: UA reflex to Microscopic and Culture   8. Hyperlipidemia LDL goal <70  E78.5 Comprehensive metabolic panel     Lipid panel reflex to direct LDL Fasting     CK total     atorvastatin (LIPITOR) 10 MG tablet   9. Other proteinuria  R80.8    10. Lymphedema  I89.0 Comprehensive metabolic panel     Wheelchair Order for DME - ONLY FOR DME     ESR: Erythrocyte sedimentation rate     CRP, inflammation     ESR: Erythrocyte sedimentation rate     CRP, inflammation   11. 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 Moderate depressive episode (H)  F32.1 TSH with free T4 reflex     amitriptyline (ELAVIL) 100 MG tablet     DULoxetine (CYMBALTA) 60 MG capsule     T4 free   13. Chronic pain syndrome  G89.4 amitriptyline  (ELAVIL) 100 MG tablet     DULoxetine (CYMBALTA) 60 MG capsule     oxyCODONE IR (ROXICODONE) 15 MG tablet     Pain Management Referral     Wheelchair Order for DME - ONLY FOR DME     ESR: Erythrocyte sedimentation rate     CRP, inflammation     ESR: Erythrocyte sedimentation rate     CRP, inflammation   14. Pain medication agreement- signed Nov 20,2012  Z02.89    15. Controlled substance agreement signed  Z79.899    16. S/P right hip fracture  Z87.81 Wheelchair Order for DME - ONLY FOR DME   17. Spinal stenosis of lumbar region, unspecified whether neurogenic claudication present  M48.061 Wheelchair Order for DME - ONLY FOR DME   18. Lumbar radiculopathy  M54.16 amitriptyline (ELAVIL) 100 MG tablet     Wheelchair Order for DME - ONLY FOR DME   19. Polyp of colon, unspecified part of colon, unspecified type  K63.5    20. Screen for colon cancer  Z12.11    21. Encounter for screening mammogram for malignant neoplasm of breast  Z12.31 MA SCREENING DIGITAL BILAT - Future  (s+30)   22. Screening for osteoporosis  Z13.820 DEXA HIP/PELVIS/SPINE - Future   23. Morbid obesity (H)  E66.01 TSH with free T4 reflex     ESR: Erythrocyte sedimentation rate     CRP, inflammation     ESR: Erythrocyte sedimentation rate     CRP, inflammation     T4 free   24. Medication monitoring encounter  Z51.81 Comprehensive metabolic panel     TSH with free T4 reflex     Lipid panel reflex to direct LDL Fasting     Hemoglobin A1c     CK total     CBC with platelets     Vitamin D Deficiency     T4 free     CANCELED: UA reflex to Microscopic and Culture   25. Encounter for long-term current use of medication  Z79.899    26. Asymptomatic menopausal state   Z78.0 DEXA HIP/PELVIS/SPINE - Future   27. Other long term (current) drug therapy   Z79.899 Vitamin D Deficiency       PLAN    Discussed treatment/modality options, including risk and benefits, she desires:    advised alcohol consumption 1oz per day or less, advised aspirin 81 mg po daily,  "advised 1 multivitamin per day, advised calcium 2570-3406 mg/d and Vitamin D 800-1200 IU/d, advised dentist every 6 months, advised diet, exercise, and weight loss, advised opthalmologist every 1 years, advised self breast exam q month, further health care maintenance, further lab(s), medication refill(s) and observation    All diagnosis above reviewed and noted above, otherwise stable.      See [facility] orders for further details.      1) med refills    2) labs pending    3) DME - Wheel chair    4) DEXA/Mammo/Colonoscopy    5) Med check every 3 months    Return in about 3 months (around 11/6/2021) for Medication Recheck Visit, Follow Up Chronic.    Health Maintenance Due   Topic Date Due     EYE EXAM  Never done     HEPATITIS B IMMUNIZATION (1 of 3 - Risk 3-dose series) Never done     DEXA  06/26/2017     HPV TEST  05/02/2019     PAP  05/02/2019     MAMMO SCREENING  07/09/2019     DIABETIC FOOT EXAM  01/29/2021     Pneumococcal Vaccine: Pediatrics (0 to 5 Years) and At-Risk Patients (6 to 64 Years) (3 of 4 - PCV13) 01/29/2021       COUNSELING    Reviewed preventive health counseling, as reflected in patient instructions    BP Readings from Last 1 Encounters:   08/06/21 122/72     Estimated body mass index is 39.47 kg/m  as calculated from the following:    Height as of 1/29/20: 1.702 m (5' 7\").    Weight as of 1/29/20: 114.3 kg (252 lb).      Weight management plan: diet and exercise     reports that she quit smoking about 8 years ago. Her smoking use included cigarettes. She has a 15.50 pack-year smoking history. She has never used smokeless tobacco.           Julius Hassan MD, FAAFP     [facility]  [address] [place] [id number]  [email or web address]   Office: [phone]  Fax: [phone]  Pager: [phone]       Answers for HPI/ROS submitted by the patient on 8/3/2021  abdominal pain: No  Blood in stool: No  Blood in " urine: No  chest pain: No  chills: No  congestion: Yes  constipation: Yes  cough: Yes  diarrhea: No  dizziness: No  ear pain: No  eye pain: No  nervous/anxious: No  fever: No  frequency: Yes  genital sores: Yes  headaches: No  hearing loss: No  heartburn: No  arthralgias: No  joint swelling: No  peripheral edema: Yes  mood changes: No  myalgias: Yes  nausea: No  dysuria: No  palpitations: No  Skin sensation changes: No  sore throat: No  urgency: Yes  rash: No  shortness of breath: Yes  visual disturbance: No  weakness: Yes  pelvic pain: No  vaginal bleeding: No  vaginal discharge: No  tenderness: No  breast mass: No  breast discharge: No

## 2021-08-06 NOTE — TELEPHONE ENCOUNTER
Routing refill request to provider for review/approval because:  Labs not current:  lipid  Jorge Elliott RN, BSN

## 2021-08-06 NOTE — LETTER
Opioid / Opioid Plus Controlled Substance Agreement    This is an agreement between you and your provider about the safe and appropriate use of controlled substance/opioids prescribed by your care team. Controlled substances are medicines that can cause physical and mental dependence (abuse).    There are strict laws about having and using these medicines. We here at [facility] are committing to working with you in your efforts to get better. To support you in this work, we ll help you schedule regular office appointments for medicine refills. If we must cancel or change your appointment for any reason, we ll make sure you have enough medicine to last until your next appointment.     As a Provider, I will:    Listen carefully to your concerns and treat you with respect.     Recommend a treatment plan that I believe is in your best interest. This plan may involve therapies other than opioid pain medication.     Talk with you often about the possible benefits, and the risk of harm of any medicine that we prescribe for you.     Provide a plan on how to taper (discontinue or go off) using this medicine if the decision is made to stop its use.    As a Patient, I understand that opioid(s):     Are a controlled substance prescribed by my care team to help me function or work and manage my condition(s).     Are strong medicines and can cause serious side effects such as:    Drowsiness, which can seriously affect my driving ability    A lower breathing rate, enough to cause death    Harm to my thinking ability     Depression     Abuse of and addiction to this medicine    Need to be taken exactly as prescribed. Combining opioids with certain medicines or chemicals (such as illegal drugs, sedatives, sleeping pills, and benzodiazepines) can be dangerous or even fatal. If I stop opioids suddenly, I may have severe withdrawal symptoms.    Do not work for all types of pain nor for all patients. If they re not helpful, I may  be asked to stop them.        The risks, benefits and side effects of these medicine(s) were explained to me. I agree that:  1. I will take part in other treatments as advised by my care team. This may be psychiatry or counseling, physical therapy, behavioral therapy, group treatment or a referral to a specialist.     2. I will keep all my appointments. I understand that this is part of the monitoring of opioids. My care team may require an office visit for EVERY opioid/controlled substance refill. If I miss appointments or don t follow instructions, my care team may stop my medicine.    3. I will take my medicines as prescribed. I will not change the dose or schedule unless my care team tells me to. There will be no refills if I run out early.     4. I may be asked to come to the clinic and complete a urine drug test or complete a pill count at any time. If I don t give a urine sample or participate in a pill count, the care team may stop my medicine.    5. I will only receive prescriptions from this clinic for chronic pain. If I am treated by another provider for acute pain issues, I will tell them that I am taking opioid pain medication for chronic pain and that I have a treatment agreement with this provider. I will inform my [facility] care team within one business day if I am given a prescription for any pain medication by another healthcare provider. My [facility] care team can contact other providers and pharmacists about my use of any medicines.    6. It is up to me to make sure that I don t run out of my medicines on weekends or holidays. If my care team is willing to refill my opioid prescription without a visit, I must request refills only during office hours. Refills may take up to 3 business days to process. I will use one pharmacy to fill all my opioid and other controlled substance prescriptions. I will notify the clinic about any changes to my insurance or medication  availability.    7. I am responsible for my prescriptions. If the medicine/prescription is lost, stolen or destroyed, it will not be replaced. I also agree not to share controlled substance medicines with anyone.    8. I am aware I should not use any illegal or recreational drugs. I agree not to drink alcohol unless my care team says I can.       9. If I enroll in the [place] [facility] Cann[facility] program, I will tell my care team prior to my next refill.     10. I will tell my care team right away if I become pregnant, have a new medical problem treated outside of my regular clinic, or have a change in my medications.    11. I understand that this medicine can affect my thinking, judgment and reaction time. Alcohol and drugs affect the brain and body, which can affect the safety of my driving. Being under the influence of alcohol or drugs can affect my decision-making, behaviors, personal safety, and the safety of others. Driving while impaired (DWI) can occur if a person is driving, operating, or in physical control of a car, motorcycle, boat, snowmobile, ATV, motorbike, off-road vehicle, or any other motor vehicle ([place] Statute 169A.20). I understand the risk if I choose to drive or operate any vehicle or machinery.    I understand that if I do not follow any of the conditions above, my prescriptions or treatment may be stopped or changed.          Opioids  What You Need to Know    What are opioids?   Opioids are pain medicines that must be prescribed by a doctor. They are also known as narcotics.     Examples are:   1. morphine (MS Contin, Lea)  2. oxycodone (Oxycontin)  3. oxycodone and acetaminophen (Percocet)  4. hydrocodone and acetaminophen (Vicodin, Norco)   5. fentanyl patch (Duragesic)   6. hydromorphone (Dilaudid)   7. methadone  8. codeine (Tylenol #3)     What do opioids do well?   Opioids are best for severe short-term pain such as after a surgery or injury. They may work well for cancer pain. They may  help some people with long-lasting (chronic) pain.     What do opioids NOT do well?   Opioids never get rid of pain entirely, and they don t work well for most patients with chronic pain. Opioids don t reduce swelling, one of the causes of pain.                                    Other ways to manage chronic pain and improve function include:       Treat the health problem that may be causing pain    Anti-inflammation medicines, which reduce swelling and tenderness, such as ibuprofen (Advil, Motrin) or naproxen (Aleve)    Acetaminophen (Tylenol)    Antidepressants and anti-seizure medicines, especially for nerve pain    Topical treatments such as patches or creams    Injections or nerve blocks    Chiropractic or osteopathic treatment    Acupuncture, massage, deep breathing, meditation, visual imagery, aromatherapy    Use heat or ice at the pain site    Physical therapy     Exercise    Stop smoking    Take part in therapy       Risks and side effects     Talk to your doctor before you start or decide to keep taking opioids. Possible side effects include:      Lowering your breathing rate enough to cause death    Overdose, including death, especially if taking higher than prescribed doses    Worse depression symptoms; less pleasure in things you usually enjoy    Feeling tired or sluggish    Slower thoughts or cloudy thinking    Being more sensitive to pain over time; pain is harder to control    Trouble sleeping or restless sleep    Changes in hormone levels (for example, less testosterone)    Changes in sex drive or ability to have sex    Constipation    Unsafe driving    Itching and sweating    Dizziness    Nausea, throwing up and dry mouth    What else should I know about opioids?    Opioids may lead to dependence, tolerance, or addiction.      Dependence means that if you stop or reduce the medicine too quickly, you will have withdrawal symptoms. These include loose poop (diarrhea), jitters, flu-like symptoms,  nervousness and tremors. Dependence is not the same as addiction.                       Tolerance means needing higher doses over time to get the same effect. This may increase the chance of serious side effects.      Addiction is when people improperly use a substance that harms their body, their mind or their relations with others. Use of opiates can cause a relapse of addiction if you have a history of drug or alcohol abuse.      People who have used opioids for a long time may have a lower quality of life, worse depression, higher levels of pain and more visits to doctors.    You can overdose on opioids. Take these steps to lower your risk of overdose:    1. Recognize the signs:  Signs of overdose include decrease or loss of consciousness (blackout), slowed breathing, trouble waking up and blue lips. If someone is worried about overdose, they should call 911.    2. Talk to your doctor about Narcan (naloxone).   If you are at risk for overdose, you may be given a prescription for Narcan. This medicine very quickly reverses the effects of opioids.   If you overdose, a friend or family member can give you Narcan while waiting for the ambulance. They need to know the signs of overdose and how to give Narcan.     3. Don't use alcohol or street drugs.   Taking them with opioids can cause death.    4. Do not take any of these medicines unless your doctor says it s OK. Taking these with opioids can cause death:    Benzodiazepines, such as lorazepam (Ativan), alprazolam (Xanax) or diazepam (Valium)    Muscle relaxers, such as cyclobenzaprine (Flexeril)    Sleeping pills like zolpidem (Ambien)     Other opioids      How to keep you and other people safe while taking opioids:    1. Never share your opioids with others.  Opioid medicines are regulated by the Drug Enforcement Agency ([place]). Selling or sharing medications is a criminal act.    2. Be sure to store opioids in a secure place, locked up if possible. Young children  can easily swallow them and overdose.    3. When you are traveling with your medicines, keep them in the original bottles. If you use a pill box, be sure you also carry a copy of your medicine list from your clinic or pharmacy.    4. Safe disposal of opioids    Most pharmacies have places to get rid of medicine, called disposal kiosks. Medicine disposal options are also available in every [facility]. Search your county and  medication disposal  to find more options. You can find more details at:  [email or web address]-[email or web address]-medications     I agree that my provider, clinic care team, and pharmacy may work with any city, state or federal law enforcement agency that investigates the misuse, sale, or other diversion of my controlled medicine. I will allow my provider to discuss my care with, or share a copy of, this agreement with any other treating provider, pharmacy or emergency room where I receive care.    I have read this agreement and have asked questions about anything I did not understand.    _______________________________________________________  Patient Signature - Amanda Richardson _____________________                   Date     _______________________________________________________  Provider Signature - Julius Hassan MD   _____________________                   Date     _______________________________________________________  Witness Signature (required if provider not present while patient signing)   _____________________                   Date

## 2021-08-06 NOTE — LETTER
August 25, 2021      Amandaabdulaziz Richardson  [address] [place] [address]        Dear ,    We are writing to inform you of your test results.    UTI is noted  Diabetes control is poor  Low HDL (good cholesterol)   Elevated white count, with small red cells  TSH is a little high with normal free T4  Signs of inflammation     We advise:     Omnicef 300 mg twice daily x 7 days, recheck urine (UA/UC) in 2-3 weeks  Further labs (peripheral smear, cbc, iron, ferritin, tibc, b12, folate)  Follow up visit to review further, may be virtual       Resulted Orders   Comprehensive metabolic panel   Result Value Ref Range    Sodium 136 133 - 144 mmol/L    Potassium 4.0 3.4 - 5.3 mmol/L    Chloride 99 94 - 109 mmol/L    Carbon Dioxide (CO2) 31 20 - 32 mmol/L    Anion Gap 6 3 - 14 mmol/L    Urea Nitrogen 16 7 - 30 mg/dL    Creatinine 0.56 0.52 - 1.04 mg/dL    Calcium 8.6 8.5 - 10.1 mg/dL    Glucose 160 (H) 70 - 99 mg/dL    Alkaline Phosphatase 120 40 - 150 U/L    AST 12 0 - 45 U/L    ALT 26 0 - 50 U/L    Protein Total 8.0 6.8 - 8.8 g/dL    Albumin 2.6 (L) 3.4 - 5.0 g/dL    Bilirubin Total 0.4 0.2 - 1.3 mg/dL    GFR Estimate >90 >60 mL/min/1.73m2      Comment:      As of July 11, 2021, eGFR is calculated by the CKD-EPI creatinine equation, without race adjustment. eGFR can be influenced by muscle mass, exercise, and diet. The reported eGFR is an estimation only and is only applicable if the renal function is stable.   TSH with free T4 reflex   Result Value Ref Range    TSH 5.41 (H) 0.40 - 4.00 mU/L   Lipid panel reflex to direct LDL Fasting   Result Value Ref Range    Cholesterol 109 <200 mg/dL      Comment:      Age 0-19 years  Desirable: <170 mg/dL  Borderline high:  170-199 mg/dl  High:            >199 mg/dl    Age 20 years and older  Desirable: <200 mg/dL    Triglycerides 85 <150 mg/dL      Comment:      0-9 years:  Normal:    Less than 75 mg/dL  Borderline high:  75-99 mg/dL  High:             Greater than or  equal to 100 mg/dL    0-19 years:  Normal:    Less than 90 mg/dL  Borderline high:   mg/dL  High:             Greater than or equal to 130 mg/dL    20 years and older:  Normal:    Less than 150 mg/dL  Borderline high:  150-199 mg/dL  High:             200-499 mg/dL  Very high:   Greater than or equal to 500 mg/dL    Direct Measure HDL 35 (L) >=50 mg/dL      Comment:      0-19 years:       Greater than or equal to 45 mg/dL   Low: Less than 40 mg/dL   Borderline low: 40-44 mg/dL     20 years and older:   Female: Greater than or equal to 50 mg/dL   Male:   Greater than or equal to 40 mg/dL         LDL Cholesterol Calculated 57 <=100 mg/dL      Comment:      Age 0-19 years:  Desirable: 0-110 mg/dL   Borderline high: 110-129 mg/dL   High: >= 130 mg/dL    Age 20 years and older:  Desirable: <100mg/dL  Above desirable: 100-129 mg/dL   Borderline high: 130-159 mg/dL   High: 160-189 mg/dL   Very high: >= 190 mg/dL    Non HDL Cholesterol 74 <130 mg/dL      Comment:      0-19 years:  Desirable:          Less than 120 mg/dL  Borderline high:   120-144 mg/dL  High:                   Greater than or equal to 145 mg/dL    20 years and older:  Desirable:          130 mg/dL  Above Desirable: 130-159 mg/dL  Borderline high:   160-189 mg/dL  High:               190-219 mg/dL  Very high:     Greater than or equal to 220 mg/dL    Patient Fasting > 8hrs? Yes    Hemoglobin A1c   Result Value Ref Range    Hemoglobin A1C 8.5 (H) 0.0 - 5.6 %      Comment:      Normal <5.7%   Prediabetes 5.7-6.4%    Diabetes 6.5% or higher     Note: Adopted from ADA consensus guidelines.   CK total   Result Value Ref Range    CK 25 (L) 30 - 225 U/L   CBC with platelets   Result Value Ref Range    WBC Count 17.3 (H) 4.0 - 11.0 10e3/uL    RBC Count 5.49 (H) 3.80 - 5.20 10e6/uL    Hemoglobin 12.7 11.7 - 15.7 g/dL    Hematocrit 41.9 35.0 - 47.0 %    MCV 76 (L) 78 - 100 fL    MCH 23.1 (L) 26.5 - 33.0 pg    MCHC 30.3 (L) 31.5 - 36.5 g/dL    RDW 19.9 (H) 10.0  - 15.0 %    Platelet Count 327 150 - 450 10e3/uL   Vitamin D Deficiency   Result Value Ref Range    Vitamin D, Total (25-Hydroxy) 46 20 - 75 ug/L    Narrative    Season, race, dietary intake, and treatment affect the concentration of 25-hydroxy-Vitamin D. Values may decrease during winter months and increase during summer months. Values 20-29 ug/L may indicate Vitamin D insufficiency and values <20 ug/L may indicate Vitamin D deficiency.    Vitamin D determination is routinely performed by an immunoassay specific for 25 hydroxyvitamin D3.  If an individual is on vitamin D2(ergocalciferol) supplementation, please specify 25 OH vitamin D2 and D3 level determination by LCMSMS test VITD23.     ESR: Erythrocyte sedimentation rate   Result Value Ref Range    Erythrocyte Sedimentation Rate 49 (H) 0 - 30 mm/hr   CRP, inflammation   Result Value Ref Range    CRP Inflammation 41.0 (H) 0.0 - 8.0 mg/L   UA reflex to Microscopic and Culture   Result Value Ref Range    Color Urine Straw Colorless, Straw, Light Yellow, Yellow    Appearance Urine Cloudy (A) Clear    Glucose Urine Negative Negative mg/dL    Bilirubin Urine Negative Negative    Ketones Urine Negative Negative mg/dL    Specific Gravity Urine >=1.030 1.003 - 1.035    Blood Urine Moderate (A) Negative    pH Urine 6.0 5.0 - 7.0    Protein Albumin Urine >=300 (A) Negative mg/dL    Urobilinogen Urine 1.0 0.2, 1.0 E.U./dL    Nitrite Urine Positive (A) Negative    Leukocyte Esterase Urine Small (A) Negative   Urine Microscopic Exam   Result Value Ref Range    Bacteria Urine Moderate (A) None Seen /HPF    RBC Urine 10-25 (A) 0-2 /HPF /HPF    WBC Urine 10-25 (A) 0-5 /HPF /HPF   Urine Culture   Result Value Ref Range    Culture >100,000 CFU/mL Escherichia coli (A)    T4 free   Result Value Ref Range    Free T4 1.40 0.76 - 1.46 ng/dL       If you have any questions or concerns, please call the clinic at the number listed above.       Sincerely,      Julius Hassan MD

## 2021-09-28 NOTE — LETTER
[address] [address]  [phone]       September 28, 2021    Amanda Richardson  [address] [place] [address]    Dear Amanda,        We care about your health and have reviewed your health plan and are making recommendations based on this review, to optimize your health.  You are in need of attention regarding:  -Diabetes - According to our records you are overdue for you Yearly Diabetic Eye Exam     We are recommending that you: See an Eye Care Provider    Please disregard this reminder if you have already scheduled or have had this exam elsewhere within the last year.  It would be helpful for us to have a copy of your eye exam report in our file so that we can best coordinate your care.    Get a dilated eye exam at least once a year and share the results with your primary care doctor.    In this exam, you will get eye drops to make your pupils larger. Pupils are the black circles in the middle of your eyes. The drops are painless and help your eye doctor see inside your eyes to look for signs of health problems.    A dilated eye exam can help your eye doctor find and treat problems to keep you from losing your vision from diabetes.    Your eye doctor may take pictures of your eyes with a tool called retinal photography. This tool helps the doctor see your retina, which is at the back part of your eyes.    Be sure to make and keep your next eye doctor appointment!    How Can Diabetes Harm Your Eyes?  Diabetes is the main cause of blindness in adults aged 20 to 74. It is also a major cause of blindness for those aged 75 or older.  Serious eye problems happen more often among people with diabetes.  Regular exams help your doctor find and treat eye problems early to protect your vision.  Make an appointment with an ophthalmologist or an optometrist. Both of these eye care specialists can give you a dilated eye exam and check your  eyes.      Common Eye Diseases Among People with Diabetes  Retinopathy causes small blood vessels in the retina (at the back of the eye) to get weak and possibly leak blood. This disease can cause blindness if not treated. This disease has no symptoms when it starts, so it's important to get your eyes checked regularly.  Cataracts cause a  clouding  of the lens of the eye, which makes vision blurry. People with diabetes are more likely to get cataracts.  Glaucoma causes pressure in the eye. If not treated, glaucoma can cause vision loss or blindness.      In addition, here is a list of due or overdue Health Maintenance reminders.    Health Maintenance Due   Topic Date Due     Eye Exam  Never done     Hepatitis B Vaccine (1 of 3 - Risk 3-dose series) Never done     Osteoporosis Screening  06/26/2017     HPV Screening  05/02/2019     PAP Smear  05/02/2019     Mammogram  07/09/2019     Diabetic Foot Exam  01/29/2021     Pneumococcal Vaccine (3 of 4 - PCV13) 01/29/2021     Flu Vaccine (1) 09/01/2021       To address the above recommendations, we encourage you to contact us at [phone], via [employer] or by contacting Central Scheduling toll free at [phone] 24 hours a day. They will assist you with finding the most convenient time and location.    Thank you for trusting [facility] and we appreciate the opportunity to serve you.  We look forward to supporting your healthcare needs in the future.    Healthy Regards,    Your [facility] Team

## 2021-09-28 NOTE — TELEPHONE ENCOUNTER
Needs of attention regarding:  -Diabetes    Health Maintenance Topics with due status: Overdue       Topic Date Due    EYE EXAM Never done    HEPATITIS B IMMUNIZATION Never done    DEXA 06/26/2017    HPV TEST 05/02/2019    PAP 05/02/2019    MAMMO SCREENING 07/09/2019    DIABETIC FOOT EXAM 01/29/2021    Pneumococcal Vaccine: Pediatrics (0 to 5 Years) and At-Risk Patients (6 to 64 Years) 01/29/2021    INFLUENZA VACCINE 09/01/2021       Communication:  See Letter

## 2021-10-25 NOTE — PROGRESS NOTES
Amanda is a 56 year old who is being evaluated via a billable telephone visit.      What phone number would you like to be contacted at? [phone]  How would you like to obtain your AVS? MyChart    Assessment & Plan       ICD-10-CM    1. MS (multiple sclerosis) (H)  G35    2. Chronic pain syndrome  G89.4 Drug Abuse Screen Panel 13, Urine (Pain Care Package) - lab collect   3. Spinal stenosis of lumbar region, unspecified whether neurogenic claudication present  M48.061    4. Lumbar radiculopathy  M54.16    5. Type 2 diabetes mellitus with stage 1 chronic kidney disease, without long-term current use of insulin (H)  E11.22     N18.1    6. Type 2 diabetes, HbA1c goal < 7% (H)  E11.9    7. CKD (chronic kidney disease) stage 1, GFR 90 ml/min or greater  N18.1    8. Proteinuria, unspecified type  R80.9    9. Hypertension goal BP (blood pressure) < 140/90  I10    10. Hyperlipidemia LDL goal <70  E78.5    11. Acute sinusitis with symptoms > 10 days  J01.90 amoxicillin-clavulanate (AUGMENTIN) 875-125 MG tablet   12. T-cell lymphoma (H)  C85.90    13. Lymphedema  I89.0    14. 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 Morbid obesity (H)  E66.01    16. Controlled substance agreement signed  Z79.899    17. Medication monitoring encounter  Z51.81 Drug Abuse Screen Panel 13, Urine (Pain Care Package) - lab collect       Discussed treatment/modality options, including risk and benefits, she desires:    1) Pain clinic f/u    2) Neurology f/u    3) future labs after 11/6    4) augmentin, sx cares    5) every 3 months visits    All diagnosis above reviewed and noted above, otherwise stable.      See [facility] orders for further details.           Return in about 3 months (around 1/25/2022) for Medication Recheck Visit, Follow Up Chronic.      No LOS data to display  -Doing chart review, history and exam, documentation and further activities as noted.           Julius Hassan MD, [facility]  [facility]  [address] [place] [id number]  [email or web address]   Office: [phone]  Fax: [phone]  Pager: [phone]       Jada Patel is a 56 year old who presents for the following health issues     HPI     Chronic Pain/MS - stable - feels like pain could be better controlled (back, knees, hip), can function, advised follow up with pain, followed in past by Saba Worley, needs to follow up with Neurology/MS    Medication Followup of oxycodone    Taking Medication as prescribed: yes    Side Effects:  None    Medication Helping Symptoms:  Yes      CSA/UDS UTD, no concerns    Sinus sx - bilateral pain in the cheeks, green discharge, rhinitis, no pnd, no f/c, some chills, mild cough, productive green, ST in am, 9 days, normal taste and smell    DM - follow up labs after 11/6    Range 112 to 188  Average am 120    Lab Results   Component Value Date    A1C 8.5 08/06/2021    A1C 6.4 08/03/2019    A1C 6.3 03/17/2019    A1C 6.6 02/09/2019    A1C 6.4 01/30/2019    A1C 6.8 06/20/2018     Hyperlipidemia Follow-Up      Are you regularly taking any medication or supplement to lower your cholesterol?   Yes- Lipitor    Are you having muscle aches or other side effects that you think could be caused by your cholesterol lowering medication?  No     Recent Labs   Lab Test 08/06/21  1157 08/03/19  0930 05/02/16  1613 12/08/14  1756   CHOL 109 124   < > 172   HDL 35* 36*   < > 36*   LDL 57 71   < > 97   TRIG 85 84   < > 197*   CHOLHDLRATIO  --   --   --  4.8    < > = values in this interval not displayed.     Hypertension Follow-up      Do you check your blood pressure regularly outside of the clinic? No     Are you following a low salt diet? Yes    Are your blood pressures ever more than 140 on the top number (systolic) OR more   than 90 on the bottom number (diastolic), for example 140/90? No     BP Readings from Last 3  Encounters:   21 122/72   20 118/86   01/15/20 (!) 144/79     Creatinine   Date Value Ref Range Status   2021 0.56 0.52 - 1.04 mg/dL Final   2019 0.47 (L) 0.52 - 1.04 mg/dL Final     GFR Estimate   Date Value Ref Range Status   2021 >90 >60 mL/min/1.73m2 Final     Comment:     As of 2021, eGFR is calculated by the CKD-EPI creatinine equation, without race adjustment. eGFR can be influenced by muscle mass, exercise, and diet. The reported eGFR is an estimation only and is only applicable if the renal function is stable.   2019 >90 >60 mL/min/[1.73_m2] Final     Comment:     Non  GFR Calc  Starting 2018, serum creatinine based estimated GFR (eGFR) will be   calculated using the Chronic Kidney Disease Epidemiology Collaboration   (CKD-EPI) equation.       Depression Followup    How are you doing with your depression since your last visit? No change    Are you having other symptoms that might be associated with depression? No    Have you had a significant life event?  No     Are you feeling anxious or having panic attacks?   No    Do you have any concerns with your use of alcohol or other drugs? No    Social History     Tobacco Use     Smoking status: Former Smoker     Packs/day: 0.50     Years: 31.00     Pack years: 15.50     Types: Cigarettes     Quit date: 2013     Years since quittin.2     Smokeless tobacco: Never Used     Tobacco comment: since age 17   Vaping Use     Vaping Use: Never used   Substance Use Topics     Alcohol use: No     Drug use: No     PHQ 2020   PHQ-9 Total Score 3 3 3   Q9: Thoughts of better off dead/self-harm past 2 weeks Not at all Not at all Not at all     REJI-7 SCORE 2020   Total Score 0 0 0     Suicide Assessment Five-step Evaluation and Treatment (SAFE-T)           Review of Systems   CONSTITUTIONAL: NEGATIVE for fever, chills, change in weight  INTEGUMENTARY/SKIN:  NEGATIVE for worrisome rashes, moles or lesions  EYES: NEGATIVE for vision changes or irritation  ENT/MOUTH: NEGATIVE for ear, mouth and throat problems  RESP: NEGATIVE for significant cough or SOB  CV: NEGATIVE for chest pain, palpitations or peripheral edema  GI: NEGATIVE for nausea, abdominal pain, heartburn, or change in bowel habits  : NEGATIVE for frequency, dysuria, or hematuria  MUSCULOSKELETAL: NEGATIVE for significant arthralgias or myalgia  NEURO: NEGATIVE for weakness, dizziness or paresthesias  ENDOCRINE: NEGATIVE for temperature intolerance, skin/hair changes  HEME: NEGATIVE for bleeding problems  PSYCHIATRIC: NEGATIVE for changes in mood or affect      Objective         Vitals:  No vitals were obtained today due to virtual visit.    Physical Exam   healthy, alert and no distress  PSYCH: Alert and oriented times 3; coherent speech, normal   rate and volume, able to articulate logical thoughts, able   to abstract reason, no tangential thoughts, no hallucinations   or delusions  Her affect is normal  RESP: No cough, no audible wheezing, able to talk in full sentences  Remainder of exam unable to be completed due to telephone visits    Pending future labs    Reviewed recent labs    Phone call duration: 20 minutes

## 2021-11-22 NOTE — TELEPHONE ENCOUNTER
My chart message sent     Cordelia Stevens RN, BSN  [facility]

## 2021-12-07 NOTE — TELEPHONE ENCOUNTER
oxyCODONE IR (ROXICODONE) 15 MG tablet 120 tablet 0 11/10/2021  No   Sig - Route: Take 1 tablet (15 mg) by mouth every 6 hours as needed for moderate to severe pain Limit 4 tablet(s) per day. - Oral       Last office visit: 10/25/2021     Future Office Visit:        CSA -- Patient Level:    Controlled Substance Agreement - Opioid - Scan on 8/6/2021  3:07 PM  Controlled Substance Agreement - Opioid - Scan on 11/25/2020  2:43 PM             Routing refill request to provider for review/approval because:  Drug not on the [facility] refill protocol     Cordelia Stevens RN, BSN  [facility] [place]

## 2021-12-10 NOTE — TELEPHONE ENCOUNTER
Patient calls and the [facility] in [place] is closed.  Please resend Oxycode RX to [facility] asa per patient.

## 2021-12-10 NOTE — TELEPHONE ENCOUNTER
Amanda is scheduled for a telephone visit on 01/24/22 w/Dr Hassan.    Shannon VILLALOBOS  [facility]   [facility]

## 2021-12-22 NOTE — PATIENT INSTRUCTIONS
You may want to try warm salt water gargles or rinses to feel better or help prevent another bout in the future. Mix 1 teaspoon of salt in 8 ounces of water, gargle, and spit. Do this several times a day for several days. Do not swallow the mixture.    You may want to try a nasal lavage (also known as nasal irrigation). You can find over-the-counter products, such as Neti-Pot, at retail locations or make your own at home. Instructions for homemade nasal lavage and more information on the process are available online at http://www.aafp.org/[email or web address]/1115/p1121.html.    Dear Amanda Richardson    After reviewing your responses, I've been able to diagnose you with?a sinus infection caused by bacteria.?     Based on your responses and diagnosis, I have prescribed doxycycline to treat your symptoms. I have sent this to your pharmacy.?     It is also important to stay well hydrated, get lots of rest and take over-the-counter decongestants,?tylenol?or ibuprofen if you?are able to?take those medications per your primary care provider to help relieve discomfort.?     It is important that you take?all of?your prescribed medication even if your symptoms are improving after a few doses.? Taking?all of?your medicine helps prevent the symptoms from returning.?     If your symptoms worsen, you develop severe headache, vomiting, high fever (>102), or are not improving in 7 days, please contact your primary care provider for an appointment or visit any of our convenient Walk-in Care or Urgent Care Centers to be seen which can be found on our website?here.?     Thanks again for choosing?us?as your health care partner,?   ?  Julius Hassan MD?

## 2021-12-24 PROBLEM — R65.21 SEPTIC SHOCK (H): Status: ACTIVE | Noted: 2021-01-01

## 2021-12-24 PROBLEM — R34 ANURIA: Status: ACTIVE | Noted: 2021-01-01

## 2021-12-24 PROBLEM — I21.4 NSTEMI (NON-ST ELEVATED MYOCARDIAL INFARCTION) (H): Status: ACTIVE | Noted: 2021-01-01

## 2021-12-24 PROBLEM — J96.01 ACUTE HYPOXEMIC RESPIRATORY FAILURE (H): Status: ACTIVE | Noted: 2021-01-01

## 2021-12-24 PROBLEM — M62.838 MUSCLE SPASM: Status: ACTIVE | Noted: 2021-01-01

## 2021-12-24 PROBLEM — K72.00 SHOCK LIVER: Status: ACTIVE | Noted: 2021-01-01

## 2021-12-24 PROBLEM — N17.9 ACUTE RENAL FAILURE, UNSPECIFIED ACUTE RENAL FAILURE TYPE (H): Status: ACTIVE | Noted: 2021-01-01

## 2021-12-24 PROBLEM — G93.40 ENCEPHALOPATHY: Status: ACTIVE | Noted: 2021-01-01

## 2021-12-24 PROBLEM — L89.899 DECUBITUS ULCER OF LEFT LEG: Status: ACTIVE | Noted: 2021-01-01

## 2021-12-24 PROBLEM — A41.9 SEPTIC SHOCK (H): Status: ACTIVE | Noted: 2021-01-01

## 2021-12-24 PROBLEM — N12 EMPHYSEMATOUS PYELITIS: Status: ACTIVE | Noted: 2021-01-01

## 2021-12-24 PROBLEM — E87.5 HYPERKALEMIA: Status: ACTIVE | Noted: 2021-01-01

## 2021-12-24 NOTE — PROGRESS NOTES
Urine output of approx 75 ml last 2 hours, Lactate now down to 2.5  Discussed with  and son  Continue supportive care  Will move to ICU this morning as soon as a bed is available.  GB

## 2021-12-24 NOTE — ED PROVIDER NOTES
History   Chief Complaint:  Altered Mental Status       The history is provided by the EMS personnel. The history is limited by the condition of the patient.      Amanda Richardson is a 56 year old female with history of CKD stage 1, hypercapnia respiratory failure, hypertension, depressive disorder, non-Hodgkin's lymphoma, multiple sclerosis, Baclofen pump, and type 2 diabetes mellitus who presents with EMS for unresponsiveness. Amanda lives with her cousin and son, who take care of her. On 12/21/21, the patient had an E-Visit for a sinus problem and she was diagnosed with acute bacterial sinusitis and prescribed a seven day course of Doxycycline, per Chart Review.     Per Triage Notes, the patient had a decreased appetite this morning. Her family left her alone and when they returned at 1500, she was at baseline and interactive. Then at 1600, her  attempted to wake her up to take Doxycycline, but she was unresponsive so they called EMS. Upon arrival, EMS found noticed codeine bottles nearby but no alcohol bottles. En route to the emergency department, the patient became more restless on board. She had no evidence of emesis. O2 sats decreased to the low 80s, however she did not tolerate nasal trumpets so they did not bag her. Blood pressure was in the lows 70s. EMS gave her a one dose of Narcan at 2050 and then a second dose of Narcan at 2100. She was moving her right side more than her left at that time. Her plasma glucose level was 70. Here, the patient is unresponsive and restless on the bed, so she was sedated and intubated with suction performed as well. She was noted to have a pressure ulcer to her left leg and compression stocking in place.    Review of Systems   Unable to perform ROS: Patient unresponsive     Allergies:  Lisinopril  Flexeril [Cyclobenzaprine Hcl]    Medications:  acetaminophen (TYLENOL) 325 MG tablet  albuterol (PROVENTIL) (2.5 MG/3ML) 0.083% neb solution  amitriptyline (ELAVIL)  100 MG tablet  aspirin (ASA) 81 MG chewable tablet  atorvastatin (LIPITOR) 10 MG tablet  baclofen (LIORESAL) 10 MG tablet  doxycycline hyclate (VIBRA-TABS) 100 MG tablet  DULoxetine (CYMBALTA) 60 MG capsule  Hesperidin-Diosmin 250-650 MG TABS  losartan-hydrochlorothiazide (HYZAAR) 50-12.5 MG tablet  metFORMIN (GLUCOPHAGE) 500 MG tablet  metoprolol succinate ER (TOPROL-XL) 50 MG 24 hr tablet  Multiple Vitamin (MULTI-VITAMIN PO)  naloxone (NARCAN) 4 MG/0.1ML nasal spray  omega-3 fatty acids (FISH OIL) 1200 MG capsule  oxyCODONE IR (ROXICODONE) 15 MG tablet  senna-docusate (SENOKOT-S/PERICOLACE) 8.6-50 MG tablet  vitamin D3 (VITAMIN D3) 1000 units (25 mcg) tablet    Past Medical History:     Abnormality of gait   Arrhythmia    Depressive disorder   Nonallopathic lesion of cervical region, not elsewhere classified   Nonallopathic lesion of thoracic region, not elsewhere classified   Numbness and tingling    Other chronic pain   Other proteinuria   Pain in joint, pelvic region and thigh   Tobacco abuse    MS (multiple sclerosis)   Non Hodgkin's lymphoma   Leukocytosis  Gallstone  Hypertension goal BP (blood pressure) < 140/90  Spinal stenosis of lumbar region, unspecified whether neurogenic claudication present  Pain medication agreement- signed Nov 20,2012  Lumbar radiculopathy  Colon polyps  Neuralgia, neuritis, and radiculitis, unspecified  Encounter for long-term current use of medication  Health Care Home  Moderate depressive episode   Leg muscle spasm  Chronic pain syndrome  Controlled substance agreement signed  T-cell lymphoma   Type 2 diabetes, HbA1c goal < 7%   Hyperlipidemia LDL goal <70  CKD (chronic kidney disease) stage 1, GFR 90 ml/min or greater  Type 2 diabetes mellitus with stage 1 chronic kidney disease, without long-term current use of insulin   Morbid obesity  Acute hypercapnic respiratory failure   Sepsis   Lymphedema  Other proteinuria    Hypercapnia respiratory failure    Past Surgical History:     Colonoscopy   Combined cystoscopy, retrogrades, ureteroscopy, insert stent, left   Combined cystoscopy, retrogrades, ureteroscopy, laser holmium lithotripsy ureter(s), insert stent, left   Cystoscopy   Cystoscopy, remove stent(s), combined, left   Fusion lumbar anterior, fusion lumbar posterior two levels, combined  Insert pump baclofen  Irrigation and debridement lower extremity, combined, right   Open reduction internal fixation ankle  Sinus surgery  Left L4-5 epidural      Family History:    CAD (father)   CHF (father)   Cancer (father)   Hypertension (mother)   Thyroid disease (mother)  Breast cancer (sister)     Social History:  The patient is .     Physical Exam     Patient Vitals for the past 24 hrs:   BP Temp Temp src Pulse Resp SpO2 Weight   12/24/21 0145 -- 96.8  F (36  C) -- 88 19 95 % --   12/24/21 0135 -- 96.8  F (36  C) -- 90 19 96 % --   12/24/21 0030 113/61 (!) 93.2  F (34  C) -- 93 23 95 % --   12/24/21 0015 108/57 (!) 85.1  F (29.5  C) -- 79 16 96 % --   12/24/21 0000 107/56 (!) 96.4  F (35.8  C) -- 85 20 96 % --   12/23/21 2345 (!) 68/54 (!) 96.4  F (35.8  C) -- 93 16 96 % --   12/23/21 2330 100/46 (!) 96.6  F (35.9  C) -- 90 21 96 % --   12/23/21 2315 97/46 -- -- 87 20 95 % --   12/23/21 2300 (!) 80/40 -- -- 77 19 93 % --   12/23/21 2250 90/49 -- -- 77 20 95 % --   12/23/21 2245 90/49 -- -- 78 19 91 % --   12/23/21 2239 (!) 54/40 -- -- -- -- 92 % --   12/23/21 2235 (!) 79/60 -- -- -- -- 92 % --   12/23/21 2232 (!) 64/44 -- -- -- -- 92 % --   12/23/21 2210 (!) 91/35 -- -- 92 15 98 % --   12/23/21 2205 (!) 67/36 -- -- 90 16 99 % --   12/23/21 2200 (!) 74/46 -- -- 96 20 92 % --   12/23/21 2155 (!) 86/51 -- -- 93 12 94 % --   12/23/21 2150 (!) 77/36 -- -- 95 15 95 % --   12/23/21 2145 (!) 79/27 -- -- 96 17 (!) 44 % --   12/23/21 2140 (!) 95/35 -- -- 98 17 (!) 78 % --   12/23/21 2135 (!) 74/56 -- -- 98 17 90 % --   12/23/21 2130 (!) 95/31 -- -- 100 11 91 % --   12/23/21 2125 -- -- -- 93 17  (!) 84 % --   12/23/21 2120 94/43 -- -- 92 21 92 % --   12/23/21 2111 (!) 121/24 -- -- 83 -- -- 129.7 kg (285 lb 15 oz)   12/23/21 2110 (!) 121/24 97.9  F (36.6  C) Oral 83 -- -- --       Physical Exam  General: Moaning and confused, spasms of right arm and left leg, high BMI, chronically ill appearing,   Eyes: PERRL, conjunctivae pink no scleral icterus or conjunctival injection  ENT:  Dry mucus membranes, +dried yellow mucus in nares  Respiratory:  Coarse breath sounds, tachypneic, desaturates on room air  CV: Normal rate and rhythm, no murmurs/rubs/gallops  GI:  Abdomen soft and protuberant  : Purulent drainage from vagina,   Skin: Bilateral chronic skin changes.  Groin intertrigo worse on the left than the right.  Excoriation and maceration in the perineum.  Musculoskeletal: Severe changes of lymphedema bilaterally.  Neuro: Confused, unable to follow commands, agitated   Psychiatric: Unable to assess      Emergency Department Course   ECG  ECG obtained at 2138, ECG read at 2143  Normal sinus rhythm   Left axis deviation   Right bundle branch block  Left ventricular hypertrophy with repolarization abnormality   Inferior infarct, age undetermined  Anterolateral infarct, age undetermined  Abnormal ECG    Changes noted above as compared to prior, dated 03/21/19.  Sinus rhythm with first degree AV block   Left axis deviation   Moderate voltage criteria for LVH, may be normal variant  Anteroseptal infarct, age undetermined  Abnormal ECG   Rate 98 bpm. NJ interval 190 ms. QRS duration 156 ms. QT/QTc 402/513 ms. P-R-T axes * -76 81.     Imaging:  XR Chest Port 1 View   Final Result   IMPRESSION: Cardiac enlargement. Endotracheal tube 5.6 cm above ron. Enteric tube below lower aspect of film. Right IJ line in SVC. Bibasilar atelectasis or infiltrate. Small left effusion.      CT Chest Abdomen Pelvis w/o Contrast   Final Result   IMPRESSION:   1.  Bibasilar atelectasis or infiltrate and trace pleural effusions.   2.   Small amount of air in the left renal collecting system. Was there recent instrumentation? Correlate for emphysematous pyelitis.   3.  Cholelithiasis. Gallbladder wall thickening not excluded.   4.  Trace amount of free fluid.   5.  Bilateral renal calculi.   6.  1.6 cm fatty lesion within the uterus.   7.  Bilateral oval densities along the pelvic sidewall. Uncertain if these are chronic fluid collection/seroma. Possibility of adenopathy not excluded. Follow-up recommended.      Cervical spine CT w/o contrast   Final Result   IMPRESSION:   HEAD CT:   1.  No acute intracranial abnormality.      CERVICAL SPINE CT:   1.  No CT evidence for acute fracture or post traumatic subluxation.         CT Head w/o Contrast   Final Result   IMPRESSION:   HEAD CT:   1.  No acute intracranial abnormality.      CERVICAL SPINE CT:   1.  No CT evidence for acute fracture or post traumatic subluxation.         XR Chest Port 1 View   Final Result   IMPRESSION: ET tube 5 cm above the ron.      POC US GUIDANCE NEEDLE PLACEMENT    (Results Pending)     Report per radiology    Laboratory:  Labs Ordered and Resulted from Time of ED Arrival to Time of ED Departure   CRP INFLAMMATION - Abnormal       Result Value    CRP Inflammation 159.0 (*)    INR - Abnormal    INR 1.93 (*)    COMPREHENSIVE METABOLIC PANEL - Abnormal    Sodium 135      Potassium 5.4 (*)     Chloride 95      Carbon Dioxide (CO2) 27      Anion Gap 13      Urea Nitrogen 76 (*)     Creatinine 3.50 (*)     Calcium 7.6 (*)     Glucose 107 (*)     Alkaline Phosphatase 246 (*)     AST 3,722 (*)     ALT >1,000 (*)     Protein Total 8.3      Albumin 2.4 (*)     Bilirubin Total 1.5 (*)     GFR Estimate 14 (*)    MAGNESIUM - Abnormal    Magnesium 2.4 (*)    PHOSPHORUS - Abnormal    Phosphorus 12.2 (*)    TROPONIN I - Abnormal    Troponin I High Sensitivity 195 (*)    NT PROBNP INPATIENT - Abnormal    N terminal Pro BNP Inpatient 15,209 (*)    TSH - Abnormal    TSH 14.06 (*)     ACETAMINOPHEN LEVEL - Abnormal    Acetaminophen 2 (*)    ISTAT GASES ELECTROLYTES VENOUS POCT - Abnormal    CPB Applied No      Hematocrit POCT 49 (*)     Bicarbonate Venous POCT 28      Hemoglobin POCT 16.7 (*)     Potassium POCT 5.6 (*)     Sodium POCT 137      pCO2 Venous POCT 77 (*)     pH Venous POCT 7.17 (*)     pO2 Venous POCT 28      O2 Sat, Venous POCT 37 (*)    CBC WITH PLATELETS AND DIFFERENTIAL - Abnormal    WBC Count 31.9 (*)     RBC Count 5.98 (*)     Hemoglobin 12.8      Hematocrit 47.7 (*)     MCV 80      MCH 21.4 (*)     MCHC 26.8 (*)     RDW 20.7 (*)     Platelet Count 400      % Neutrophils 90      % Lymphocytes 3      % Monocytes 3      % Eosinophils 0      % Basophils 1      % Immature Granulocytes 3      NRBCs per 100 WBC 1 (*)     Absolute Neutrophils 28.8 (*)     Absolute Lymphocytes 0.9      Absolute Monocytes 1.1      Absolute Eosinophils 0.0      Absolute Basophils 0.2      Absolute Immature Granulocytes 0.9 (*)     Absolute NRBCs 0.2     ISTAT TROPONIN POCT - Abnormal    TROPPC POCT 0.30 (*)    ISTAT CREATININE POCT - Abnormal    Creatinine POCT 4.0 (*)     GFR, ESTIMATED POCT 12 (*)    ISTAT GASES LACTATE VENOUS POCT - Abnormal    Lactic Acid POCT 6.5 (*)     Bicarbonate Venous POCT 30 (*)     O2 Sat, Venous POCT 50 (*)     pCO2V Venous POCT 90 (*)     pH Venous POCT 7.13 (*)     pO2 Venous POCT 37     ACETAMINOPHEN LEVEL - Abnormal    Acetaminophen <2 (*)    GLUCOSE BY METER - Abnormal    GLUCOSE BY METER POCT 104 (*)    TSH WITH FREE T4 REFLEX - Abnormal    TSH 14.06 (*)    GLUCOSE BY METER - Abnormal    GLUCOSE BY METER POCT 159 (*)    ERYTHROCYTE SEDIMENTATION RATE AUTO - Normal    Erythrocyte Sedimentation Rate 14     PARTIAL THROMBOPLASTIN TIME - Normal    aPTT 32     AMMONIA - Normal    Ammonia 49     SALICYLATE LEVEL - Normal    Salicylate 2     ETHYL ALCOHOL LEVEL - Normal    Alcohol ethyl <0.01     INFLUENZA A/B & SARS-COV2 PCR MULTIPLEX - Normal    Influenza A PCR Negative    "   Influenza B PCR Negative      SARS CoV2 PCR Negative     METHEMOGLOBIN - Normal    Methemoglobin 0.4     POTASSIUM - Normal    Potassium 4.7     DRUG ABUSE SCREEN 77 URINE (FL, RH, SH) - Normal    Amphetamines Urine Screen Negative      Barbiturates Urine Screen Negative      Benzodiazepines Urine Screen Negative      Cannabinoids Urine Screen Negative      Cocaine Urine Screen Negative      Opiates Urine Screen Negative      PCP Urine Screen Negative     T4 FREE - Normal    Free T4 1.26     ROUTINE UA WITH MICROSCOPIC REFLEX TO CULTURE   GLUCOSE MONITOR NURSING POCT   POTASSIUM   GLUCOSE MONITOR NURSING POCT   TYPE AND SCREEN, ADULT    ABO/RH(D) O NEG      Antibody Screen Negative      SPECIMEN EXPIRATION DATE [id number]     BLOOD CULTURE   BLOOD CULTURE   ABO/RH TYPE AND SCREEN        Procedures    [facility] Procedure Note           Intubation      INDICATION: Acute respiratory failure. and Airway protection.    PERFORMED BY: Dr. Esteban    CONSENT: The procedure was performed in an emergent situation.    TIMEOUT: Immediately prior to procedure a \"time out\" was called to verify the correct patient, procedure, equipment, support staff and site/side marked as required.    INTUBATION METHOD: CMAC    PATIENT STATUS: Unconscious    PREOXYGENATION: Mask    PRETREATMENT MEDICATIONS: None    SEDATIVES: Etomidate    PARALYTIC: rocuronium    LARYNGOSCOPE SIZE: D blade    TUBE SIZE: 7.5 cuffed with cuff inflated after placement  Number of attempts: 1  Cricoid pressure: yes  Cords visualized: yes    POST-PROCEDURE ASSESSMENT: Breath sounds equal bilaterally with chest rise and absent over the epigastrium, Chest x-ray interpreted by me demonstrating endotracheal tube in appropriate position and CO2 detector.    ETT TO TEETH: 23 cm  Tube secured with: ETT kim    Patient tolerated the procedure well with no immediate complications.  COMPLICATIONS:  None      Central Line Placement     Procedure:  Central Line " Placement with Ultrasound Guidance.    Indications: Vascular access, pressors    Consent:  Risks (including but not limited to: pneumothorax,bleeding, infection or artery puncture), benefits and alternatives were discussed with  unable to obtain due to emergency conditions and consent for procedure was obtained.    Timeout:  Universal protocol was followed. TIME OUT conducted just prior to starting procedure confirmed patient identity, site/side, procedure, patient position, and availability of correct equipment and implants.?  Yes    Procedure note:  Right Internal Jugular approach was selected and the right anterior neck was prepped, cleansed and draped in a sterile fashion.  Mask, gown and gloves were used per sterile protocol.  Patient was then placed into Trendelenburg position and lidocaine was used for local anesthesia.  Landmarks were identified and Vascular probe with ultrasound was used in a sterile fashion for guidence.  Introducer needle was then used to gain access to the central venous circulation.  Using Seldinger technique the  Triple lumen catheter was placed.  Catheter port(s) were aspirated and flushed.  Central line was sutured in place and sterile dressing applied.   Appropriate placement was confirmed by x-ray and no pneumothorax was visualized.    Patient Status:  Patient tolerated the procedure well.  There were no complications.     Narritive: Arterial Line Insertion      INDICATION: Continuous blood pressure monitoring     ANESTHESIA:  Versed gtt and fentanyl    CONSENT:   Risks (including but not limited to: bleeding, infection or pain), benefits, and alternatives were discussed with unable to obtain due to emergency conditions and consent for procedure was obtained.      [place] protocol was followed. TIME OUT conducted just prior to    starting the procedure confirmed patient identity, site/side, procedure,    patient position, abd availability of correct equipment and implants.  Yes    The skin overlying the right femoral artery was prepped and draped in a sterile fashion. The artery was entered with a finder needle through which a guidewire was inserted. The needle was then removed and a 10 cm arterial cannula was inserted over the guidewire without difficulty. The guidewire was removed and the catheter was sutured to the underlying skin. The patient tolerated the procedure well and there were no immediate complications.    PATIENT STATUS: The patient tolerated the procedure well and there were minor complications of failed radial arterial line attempts.      Emergency Department Course:  Reviewed:  I reviewed nursing notes, vitals, past medical history and Care Everywhere    Assessments:  2108 The patient arrived to stabilization room 1 via EMS. I obtained history and examined the patient as noted above.   2126 The patient was given sedatives for intubation, see procedure above.  2144 The patient was noted to be desatting to the low 50s.   2147 Portable chest x-ray was performed.     Consults:  I spoke to the patient's  over the phone.   2338 I discussed the case with Dr. Rodriguez of the ICU.     Interventions:  Medications   lidocaine 1 % 0.1-1 mL (has no administration in time range)   lidocaine (LMX4) cream (has no administration in time range)   sodium chloride (PF) 0.9% PF flush 3 mL (3 mLs Intracatheter Given 12/24/21 0030)   sodium chloride (PF) 0.9% PF flush 3 mL (has no administration in time range)   lidocaine (XYLOCAINE) 2 % external gel 20 mL (has no administration in time range)   lactated ringers infusion (has no administration in time range)   vancomycin 2500 mg in 0.9% NaCl 500 ml intermittent infusion 2,500 mg (2,500 mg Intravenous New Bag 12/24/21 0054)   midazolam (VERSED) drip - ADULT 100 mg/100 mL in NS (pre-mix) (4 mg/hr Intravenous Rate/Dose Verify 12/24/21 0107)   glucose gel 15-30 g (has no administration in time range)     Or   dextrose 50 % injection 25-50  mL (has no administration in time range)     Or   glucagon injection 1 mg (has no administration in time range)   dextrose 10% infusion ( Intravenous Rate/Dose Verify 12/24/21 0035)   norepinephrine (LEVOPHED) 4 mg in  mL infusion PREMIX (0.04 mcg/kg/min × 129.7 kg Intravenous Rate/Dose Change 12/24/21 0138)   LORazepam (ATIVAN) 2 MG/ML injection (2 mg  Given 12/23/21 2116)   lactated ringers BOLUS 2,000 mL (2,000 mLs Intravenous New Bag 12/24/21 0024)   piperacillin-tazobactam (ZOSYN) 4.5 g vial to attach to  mL bag (0 g Intravenous Stopped 12/24/21 0005)   calcium gluconate 10 % injection 1 g (0 g Intravenous Stopped 12/24/21 0005)   sodium bicarbonate 8.4 % injection 50 mEq (50 mEq Intravenous Given 12/23/21 2203)   dextrose 50 % injection 25 g (25 g Intravenous Given 12/23/21 2202)   insulin regular 1 unit/mL injection 10 Units (10 Units Intravenous Given 12/23/21 2204)   furosemide (LASIX) injection 40 mg (40 mg Intravenous Given 12/23/21 2206)   rocuronium injection 100 mg (100 mg Intravenous Given 12/23/21 2127)   etomidate (AMIDATE) injection 30 mg (30 mg Intravenous Given 12/23/21 2128)   fentaNYL (PF) (SUBLIMAZE) injection 50 mcg (50 mcg Intravenous Given 12/24/21 0035)   fentaNYL (PF) (SUBLIMAZE) injection 50 mcg (50 mcg Intravenous Given 12/24/21 0101)     Disposition:  The patient will board in the emergency department pending bed placement. Care was signed out to Dr. Hensley.     Impression & Plan     CMS Diagnoses:   The patient has signs of Septic Shock  The patient has signs of septic shock as evidenced by:  1. Presence of Sepsis, AND  2. Lactic Acidosis with value greater than or equal to 4    Time septic shock diagnosis confirmed = 2153 12/23/21   as this was the time when Lactate was resulted and the level was greater than or equal to 4    3 Hour Septic Shock Bundle Completion:  1. Initial Lactic Acid Result:   Recent Labs   Lab Test 12/23/21  2153 04/10/19  1855 03/16/19  8420  "  LACT 6.5* 1.0 1.2     2. Blood Cultures before Antibiotics: Yes  3. Broad Spectrum Antibiotics Administered:  yes       Anti-infectives (From admission through now)    Start     Dose/Rate Route Frequency Ordered Stop    12/23/21 2150  vancomycin 2500 mg in 0.9% NaCl 500 ml intermittent infusion 2,500 mg         2,500 mg  over 120 Minutes Intravenous ONCE 12/23/21 2146 12/23/21 2145  piperacillin-tazobactam (ZOSYN) 4.5 g vial to attach to  mL bag        Note to Pharmacy: For [facility], [facility] and [facility]: For Zosyn-naive patients, use the \"Zosyn initial dose + extended infusion\" order panel.    4.5 g  over 30 Minutes Intravenous ONCE 12/23/21 2144 12/24/21 0005          4. IF patient is in septic shock within 6 hours of time zero, as defined by:  -Initial Hypotension:  2 low BP readings (SBP <90, MAP <65, or decrease > 40 from baseline due to infection) within 3 hrs of each other during the time period of 6hrs before and 6 hrs  after time zero  -Lactate > or = 4  THEN: Fluid volume administered in ED:  Obese patient (BMI >30); 30 mL/kg bolus based on IBW given (see amount below).    BMI Readings from Last 1 Encounters:   12/23/21 44.78 kg/m      30 mL/kg fluids based on weight: 3,890 mL  30 mL/kg fluids based on IBW (must be >= 60 inches tall): Patient ideal weight not available.    Septic Shock reassessment:  1. Repeat Lactic Acid Level: Pending  2. Vasopressors started for Persistent Hypotension defined by the last 2 BP readings within the ONE HOUR following completion of the 30mL/kg bolus being low (SBP <90 or MAP <65).    I attest to having performed a repeat sepsis exam and assessment of perfusion at 0100 and the results demonstrate no change. and None    Medical Decision Making:  Amanda Richardson is a 56 year old female with a history of MS and spasms with the baclofen pump who comes today with severely altered mental status. She became hypoxic and was not protecting her airway. We intubated her and started " antibiotics promptly. She was found to be in shock. 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 She is in acute renal failure and anuric. Her potassium was elevated with EKG changes and so this was treated and potassium improved. Troponin is slightly elevated, likely secondary to the stress of this acute medical illness. Transaminases are significantly elevated, likely secondary to shock liver. She was given 3 L of fluid, consistent with her ideal body weight given her BMI of 45. She remained anuric. There is no ureteral stone on CT scan. We started her on pressors when her blood pressure started dropping. A central line was placed. An arterial line was placed. Dr. Nash of our intensivist service graciously came to the emergency department and consulted on the patient as we have no available ICU beds at our hospital or within the [facility]. He and I counseled the patient's son and . We will continue maximal medical management for the next 4 hours, until 4 AM. At that time we will reassess with a repeat lactic and an assessment of urine output for prognosis. If her lactic is not improving sufficiently and she is not having urine output, they believe they will move to comfort cares and compassionately extubate the patient. She is made DO NOT RESUSCITATE at this time. Unfortunately, we are unable to place the patient in an inpatient bed so she will remain in the emergency department. I signed out to my partner Dr. Hensley who will follow up on the repeat evaluation and discuss with family and initiate comfort measures if this is indicated. For ongoing treatment, she will consult Dr. Hopper with the intensivist service.    Critical Care Time: was 120 minutes for this patient excluding procedures    Diagnosis:    ICD-10-CM    1. Septic shock (H)  A41.9     R65.21    2. Acute hypoxemic respiratory failure (H)  J96.01    3. NSTEMI (non-ST elevated myocardial infarction) (H)  I21.4    4.  Acute renal failure, unspecified acute renal failure type (H)  N17.9    5. Hyperkalemia  E87.5    6. Anuria  R34    7. Emphysematous pyelitis  N12    8. Shock liver  K72.00    9. Encephalopathy  G93.40    10. Muscle spasm  M62.838    11. Decubitus ulcer of left leg  L89.899        Scribe Disclosure:  I, Evie Quiroz, am serving as a scribe at 9:16 PM on 12/23/2021 to document services personally performed by Montserrat Esteban MD based on my observations and the provider's statements to me.      Montserrat Esteban MD  12/24/21 021

## 2021-12-24 NOTE — PROGRESS NOTES
RT was called to STAB 1 to transport patient to ICU. Patient was stable during transport with saturations in the mid 90s. Vent settings 500 VT, RR26, 70% FiO2, PEEP 5.     Francisco Patel, RT

## 2021-12-24 NOTE — PROGRESS NOTES
[facility] ICU RESPIRATORY NOTE        Date of Admission: 12/23/2021    Date of Intubation (most recent):12/23/21    Reason for Mechanical Ventilation:Respiratory failure    Number of Days on Mechanical Ventilation:2    Met Criteria for Spontaneous Breathing Trial:No    Reason for No Spontaneous Breathing Trial:Per MD    Significant Events Today:Pt transferred up from the ED intubated.    ABG Results:   Recent Labs   Lab 12/24/21  1706 12/24/21  0822 12/24/21  0617 12/23/21  2153   PH 7.46* 7.25* 7.15* 7.13*   PCO2 43 73* 88*  --    PO2 66* 56* 100  --    HCO3 30* 32* 31*  --    O2PER 50 70 70  --        Current Vent Settings: Ventilation Mode: CMV/AC  (Continuous Mandatory Ventilation/ Assist Control)  FiO2 (%): 40 %  Rate Set (breaths/minute): 26 breaths/min  Tidal Volume Set (mL): 500 mL  PEEP (cm H2O): 5 cmH2O  Oxygen Concentration (%): 40 %  Resp: 26      Skin Assessment:Intact.    Plan:Will cont full vent support for now and will assess for weaning readiness daily.    Cordelia Bravo, RT

## 2021-12-24 NOTE — PROGRESS NOTES
Pt intubated in stab1 with 7.5ETT secured 23@lips. ETT verified by ETCo2 ,bilateral BSS and chest xray. Pt placed on full vent support. Difficult to get good pleth.ETT suctioned for small amounts of creamy thick secretions. Vent setting:Vt 450 /RR 20 /0.8 I time/PEEP10 / FIo2 100%.RR increased from 16 to 20 based on VBG. Pt transported to CT and backt to stab with out any issue.Will continue to monitor.wean FIo2 as able.   Rosa Garcia, RT

## 2021-12-24 NOTE — CONSULTS
[facility] Nurse Inpatient Pressure Injury Assessment   Reason for consultation: Evaluate and treat posterior buttock, thighs, coccyx       ASSESSMENT  Pt is has very poor skin conditioning to posterior side of buttocks, coccyx and thighs all present on admission and due to mixed etiology of incontinence and being sedentary. Unable to fully rule out pressure. Pt needing good continence care and skin care and with strict PIP skin should clear up quite quickly. Pt only has 1 measurable area at 12 O'clock perianal and rest has pinpoint areas of bleeding on and off.      TREATMENT PLAN  Perianal, coccyx and posterior thighs wound: Daily and PRN  1. Spray entire area from buttock/perianal to down to posterior thighs with Tereza Cleanse and Protect perineal cleanser.   2. Gently rub Tereza in and allow to sit on skin. She would benefit 10 minutes of soaking with Tereza if she tolerates the positioning on her side.  3. Gentle rub away using circular motions with Rg Dry White Washcloths.   4. Apply Cavilon No Sting Skin Prep (#264955) to to coccyx and allow to dry.  5. Gently spread buttock and extra skin so it is as flat as possible and apply Sacral Dressing (PS#[id number])   6. Apply a thin layer of Triad paste (#417403) to perianal open wound and any reddened or raw tissue. This can be used on open or intact tissue.  7. Time/Date/Initial Mepilex       Pressure Injury Prevention (PIP) Plan:  If patient is declining pressure injury prevention interventions: Explore reason why and address patient's concerns and Educate on pressure injury risk and prevention intervention(s)  Mattress: Follow bed algorithm, reassess daily and order specialty mattress, if indicated.  HOB: Maintain at or below 30 degrees, unless contraindicated  Repositioning in bed: Every 1-2 hours , Left/right positioning; avoid supine and Raise foot of bed prior to raising head of bed, to reduce patient sliding down (shear)  Heels: Keep elevated off mattress and Pillows  under calves  Protective Dressing: Sacral Mepilex for prevention (#153869),  especially for the agitated patient   Positioning Equipment: TAPS wedges (#607389) to help maintain 30 degree side lying position   Chair positioning: Assist patient to reposition hourly   If patient has a buttock pressure injury, or high risk for PI use chair cushion or SPS.  Moisture Management: Perineal cleansing /protection: Follow Incontinence Protocol, Avoid brief in bed, Clean and dry skin folds with bathing  and Consider InterDry (#761079) between folds  Under Devices: Inspect skin under all medical devices during skin inspection , Ensure tubes are stabilized without tension and Ensure patient is not lying on medical devices or equipment when repositioned  Ask provider to discontinue device when no longer needed.          Orders Written  WO Nurse follow-up plan:weekly  Nursing to notify the Provider(s) and re-consult the WOC Nurse if wound(s) deteriorates or new skin concern.    Patient History  According to provider note(s):  Ms. Richardson is a complex 56-year-old female with a history of recurrent infections and chronic illness.  She was at home over the last 12 hours when her  and son noticed a decreased level of consciousness.  They called 911, who brought her to [facility].  When she arrived here, she was premorbid and had markedly altered level of consciousness.  Dr. Esteban who has done an outstanding job of resuscitation; intubating the patient, placing a central line, giving 2 liters of IV fluid, and placing a Boob catheter.  Objective Data  Containment of urine/stool: Incontinence Protocol    Current Diet/ Nutrition:  Orders Placed This Encounter      NPO for Medical/Clinical Reasons Except for: Meds, Ice Chips      Output:   I/O last 3 completed shifts:  In: 2100 [IV Piggyback:2100]  Out: 50 [Urine:50]    Risk Assessment:                            Labs:   Recent Labs   Lab 12/24/21  0738 12/24/21  9874  12/23/21 2121   ALBUMIN  --  2.2* 2.4*   HGB 12.3  --  12.8   INR  --   --  1.93*   WBC 34.7*  --  31.9*   A1C 7.1*  --   --    CRP  --   --  159.0*       Physical Exam  Skin inspection: focused Buttock, coccyx, perianal, thighs, heels      Wound Location: Perianal/Buttocks/Posterior thighs  Wound History: Pt with history of MS and obesity with severe incontinence. Pt spends significant time sitting. Pt with chronic lymphedema and with stasis and papillomatosis (picture in chart).    Date of last Photo 12/24/21    Perianal: 2.6cm x 1.7cm x 0.1cm 12 O'clock perianal ulceration with pink moist wound bed. Unclear what may have caused this area if stripping during clean up. Pt just admitted and no recent history noted of rectal tube. Pt having leakage of loose stool during assessment. Periwound intact with pink blanchable erythema. Unclear if pt having pain to area, does not appear to have distress during assessment.       General buttock/thighs: Pt with significant venous congestion from BL buttock down to posterior thighs with scattered areas of dried drainage, scabbing, blanchable erythema and few excoriations. General skin is very dry and scaly and in need of general care. Dried drainage vs eschar to L lateral sacrum with deep creases-Appears superficial and scabbing in nature.         Interventions  Current support surface: Standard  Low air loss mattress  Current off-loading measures: Pillows  Repositioning aid: Pillows  Visual inspection of wound(s) completed   Tube Securement: ETAD  Wound Care: was done per nursing upon supply arrival.  Supplies: ordered: Triad paste and Rg Wipes  Educated provided: importance of repositioning, plan of care, Moisture management, Hygiene and Off-loading pressure  Education provided to: nurse  Discussed importance of:repositioning every 2 hours, off-loading pressure to wound, head elevation <30 degrees and moisture management  Discussed plan of care with Nurse    [place]  FREDO Wheat CWOCN

## 2021-12-24 NOTE — PROGRESS NOTES
Recent Labs   Lab 12/24/21  0617 12/23/21  8452   PH 7.15* 7.13*   PCO2 88*  --    PO2 100  --    HCO3 31*  --    O2PER 70  --        Increased RR to 26 from 20.

## 2021-12-24 NOTE — H&P
Admitted: 12/23/2021    I started evaluation on 12/23/2021 and finished on 12/24/2021.  I spent one hour with the patient bedside, evaluating, discussing with family, discussing with Dr. Esteban, and coordinating care of the patient.    HISTORY OF PRESENT ILLNESS:  Ms. Richardson is a complex 56-year-old female with a history of recurrent infections and chronic illness.  She was at home over the last 12 hours when her  and son noticed a decreased level of consciousness.  They called 911, who brought her to [facility].  When she arrived here, she was premorbid and had markedly altered level of consciousness.  Dr. Esteban who has done an outstanding job of resuscitation; intubating the patient, placing a central line, giving 2 liters of IV fluid, and placing a Bobo catheter.    In obtaining history from her  and son, she is noted to have had a previous history of severe sepsis due to what is presumed to be pyelonephritis.  She spent a significant amount of time in the ICU, required treatment of her kidney stones and was on the ventilator and received IV antibiotics at that time.    Her most recent history includes sinusitis that she was evaluated via video visit for.  She was treated with antibiotics with initial good results about a month ago.  Recently, she had a recurrent set of symptoms for sinusitis and was restarted on antibiotics.    PAST MEDICAL HISTORY:  (All obtained from the chart) type 2 diabetes mellitus, non-Hodgkin's lymphoma, hypertension, and multiple sclerosis with a baclofen pump.    MEDICATIONS:  Tylenol, albuterol, amitriptyline, aspirin, Lipitor, baclofen, doxycycline, Cymbalta, losartan, hydrochlorothiazide, metformin, metoprolol, naloxone, oxycodone, and vitamin D3.    PAST SURGICAL HISTORY:  Extensive and include a lumbar fusion.  She also has had a right hip fracture that has not been fixed because of the recent issues with COVID and concern about infection.    PHYSICAL  EXAMINATION:    GENERAL:  The patient is intubated and has a central line in and is not responsive due to the sedation.  VITAL SIGNS:  Her initial blood pressure on arrival was 121/24.  This dropped to as low as 64/44 during her care.   HEENT:  Pupils are pinpoint.  Endotracheal tube is in place.  NECK:  Notable for right IJ.  CHEST:  Coarse breath sounds bilaterally.  ABDOMEN:  I am able to palpate the baclofen pump.  Liver edge is 2 cm down from the costal margin and is smooth.  PELVIC:  There is significant maceration of her tissues in the groin.  The vaginal exam does not reveal any defect, although there is marked edema and erythema around the vaginal tissues.  On rectal exam, the tone is lax.  The stool is without blood.  On extremities, the patient has significant skin changes in her lower extremities below the knee bilaterally.      IMAGING:  Chest x-ray shows bibasilar atelectasis.      CT scan of the chest, abdomen and pelvis shows bibasilar atelectasis air in the left renal collecting system without obvious dilation of the renal pelvis.  Cholelithiasis without cholecystitis.  Bilateral renal calculi.      Head CT is negative for acute intracranial abnormality.    LABORATORY DATA:  Includes a CRP of 159 and INR of 1.93, potassium of 5.4, creatinine of 3.5, AST of 3700.  ALT greater than 1000.  Alkaline phosphatase of 246, total bilirubin of 1.5, albumin 2.4.  Her TSH is 14.6.  White count is 32,000 with a hemoglobin of 12.8.  The lactate was 6.5.    ASSESSMENT/PLAN:  1.  Septic shock, likely urinary in nature.  She has been started on IV antibiotics by Dr. Esteban.  2.  Acute renal failure secondary to above.  3.  Shock liver.  4.  Severe chronic illness/debility.      I discussed the situation at length with  and her son, and talked about goals of therapy for the patient.  They would like to make her do not resuscitate, which I think is very reasonable.  We discussed limiting therapy further and  transitioning to comfort care if she does not make significant progress over the next four hours in terms of lactate levels and kidney function.    Familia Rodriguez MD        D: 2021   T: 2021   MT: [id number]    Name:     JIMMY SYLVESTER  MRN:      [id number]        Account:     [id number]   :      1965           Admitted:    2021       Document: [id number]

## 2021-12-24 NOTE — PROGRESS NOTES
Patient admitted to ICU from ED at 0930am.     Neuro: Arouses with pain. Rigidity. Pupils equal and reactive. Not following.   Pulm: Coarse LS. Large amount of ETT and oral secretion.   CV: SR w/ BBB. On levo.   : Bobo patent with adequate UOP.   GI: Incontinent soft BMx3. NPO. OG on LIS  Extremities: Purposeful movements  Skin: Non-blanchable posterior thighs, buttocks, and romeo-area. Dry and scabs on BLE.   Lines: R internal jugular central line. PIVx2 on RUE. Claritza on R groin.   Family:  updated via phone.   Plan: Wean sedation and PS when able.

## 2021-12-24 NOTE — ED NOTES
Writer accompanied patient to CT.  En-route, patient noted to be hypotensive with a BP of 64/44.  Patient given 100mcg phenylephrine.  BP slightly improved.  Patient noted to again be hypotensive at 2239 with a BP of 54/40.  Patient again given 100mg Phenylephrine. BP improved.

## 2021-12-24 NOTE — PROGRESS NOTES
MICU Progress Note    Amanda Richardson MRN# [id number]   Age: 56 year old YOB: 1965     Date of Admission: 12/23/2021  Date of Service: 12/24/2021   ==================================================  24 HOUR EVENTS:  Stable on ventilator  Blood cultures gram-positive cocci in pairs and chains  Fever curve uptrending, WBC uptrending    Changes for Today:  Continue current cares    ==================================================    ASSESSMENT AND PLAN:  56-year-old woman with complex history including non-Hodgkin's lymphoma, multiple sclerosis on baclofen pump, hypertension, DM2, with recurrent infections who was admitted 12/23 for decreased level of consciousness. Possible concern for codeine excess.      Neuro/psych:   -Sedation: Versed, fentanyl  -Holding PTA Metformin    ## Altered mental status  Patient was admitted for altered mental status at home. EMS noted codeine bottles nearby when he picked her up, there is no response to Narcan. Urosepsis could also be contributing.    ## Multiple sclerosis  #Chronic pain  Baclofen pump in place, also has prescription for oxycodone 15 mg every 6 hours as needed    ## Depression  PTA medications include amitriptyline 100 mg nightly, duloxetine 60 mg twice daily  -Holding amitriptyline, continue duloxetine      Pulmonary:   ## Acute hypoxic and hypercarbic respiratory failure  Likely secondary to AMS. Oxygenating well on minimal ventilator settings.  -Lung protective ventilation strategy  -Pressure support trials and extubate when able      Cardiac:  ## Shock, septic  Found to have purulent urine, requiring low-dose norepinephrine to maintain maps greater than 65.  -Continue norepinephrine as needed    ## Hypertension  ## Hyperlipidemia  Holding PTA atorvastatin, losartan-hydrochlorothiazide, and metoprolol      Renal:   ## Acute on chronic kidney failure  ## CKD one  Baseline creatinine appears to be around 0.5, elevated to 3.5 on admission, now  trending down.  -Continue to trend renal function  -Avoid nephrotoxic medications      Infectious Disease:   ## Septic shock  #UTI  #Bacteremia  UA reported as purulent with large leuk esterase and white count above the detection limit with WBC clumps. Culture pending. Lactic acidosis on admission, now improving.  History of frequent UTIs. Historically with pansensitive E. coli, pansensitive  enterococci, and mixed urogenital geovanna  -Await culture results  -Continue antibiotics  -Check baseline procalcitonin  -Repeat blood cultures    ANTIBIOTICS:  Vancomycin 12/23-current  Zosyn 12/23-current    CULTURES:  12/24 urine culture-pending  12/23 blood cultures-gram-positive cocci in pairs and chains  12/23 UA grossly purulent      GI/:   -Nutrition: N.p.o.    ## Transaminitis  Likely shock liver. ALT elevated to over 4000, AST elevated to nearly 4000, alk phos and  total bilirubin also elevated.  -Check lipase  -Trend transaminases      Endocrine:   ## DM2  On Metformin at baseline. Admission hemoglobin A1c 7.1.  -Sliding scale insulin      Heme:   No acute concerns history of non-Hodgkin's lymphoma      Skin:   ##Decubitus ulcers   Coccyx, posterior thigh, and perineal area.  -Walk consult  -Bariatric bed      Prophylaxis:    -GI: PPI    -DVT: Subcutaneous heparin        Attestation:      CCT 45 min excluding procedures        This document was generated with the assistance of voice recognition software. Unintentional transcription errors may occur. Please contact the author for any clarification.    Levi Horan M.D.  Pulmonary & Critical Care  Pager: Click Here to page    ==================================================    PHYSICAL EXAM  Temp:  [85.1  F (29.5  C)-98.4  F (36.9  C)] 98.4  F (36.9  C)  Pulse:  [] 89  Resp:  [0-27] 26  BP: ()/() 125/55  MAP:  [48 mmHg-155 mmHg] 71 mmHg  Arterial Line BP: ()/() 107/52  FiO2 (%):  [60 %-70 %] 60 %  SpO2:  [44 %-100 %] 94  %    Ventilation Mode: CMV/AC  (Continuous Mandatory Ventilation/ Assist Control)  FiO2 (%): 60 %  Rate Set (breaths/minute): 26 breaths/min  Tidal Volume Set (mL): 500 mL  PEEP (cm H2O): 5 cmH2O  Oxygen Concentration (%): 60 %  Resp: 26      General: Sedated, intubated  Resp: CTAB, no crackles or wheezes  Cardiac: RRR, NS1,S2, No m/r/g  Abdomen: Soft, nondistended. +BS.    Extremities: Chronic venous stasis changes to the bilateral lower extremities with 2+ pitting edema  Skin: Warm and dry, no jaundice or rash      Date 12/24/21 0700 - 12/25/21 0659   Shift 2049-0727 2395-3568 [id number] 24 Hour Total   INTAKE   I.V. 1337.5   1337.5   Shift Total(mL/kg) 1337.5(10.13)   1337.5(10.13)   OUTPUT   Urine 85   85   Shift Total(mL/kg) 85(0.64)   85(0.64)   Weight (kg) 132 132 132 132       =====================================================  LABORATORY DATA    Labs reviewed by me personally, significant abnormalities detailed in assessment and plan.      ROUTINE ICU LABS (Last four results)  CMP  Recent Labs   Lab 12/24/21  1139 12/24/21  0941 12/24/21  0545 12/24/21  0403 12/24/21  0023 12/24/21  0011 12/23/21  2247 12/23/21  2140 12/23/21  2121 12/23/21  2116   0000   NA  --   --   --  134  --   --   --   --  135 137  --    POTASSIUM  --   --   --  5.2  5.2  --  4.7  --   --  5.4* 5.6*  --    CHLORIDE  --   --   --  96  --   --   --   --  95  --   --    CO2  --   --   --  26  --   --   --   --  27  --   --    ANIONGAP  --   --   --  12  --   --   --   --  13  --   --    GLC 66* 79 146* 220*   < >  --    < >  --  107*  --    < >   BUN  --   --   --  73*  --   --   --   --  76*  --   --    CR  --   --   --  3.14*  --   --   --  4.0* 3.50*  --   --    GFRESTIMATED  --   --   --  16*  --   --   --  12* 14*  --   --    MARY  --   --   --  6.8*  --   --   --   --  7.6*  --   --    MAG  --   --   --   --   --   --   --   --  2.4*  --   --    PHOS  --   --   --   --   --   --   --   --  12.2*  --   --    PROTTOTAL  --   --    --  7.5  --   --   --   --  8.3  --   --    ALBUMIN  --   --   --  2.2*  --   --   --   --  2.4*  --   --    BILITOTAL  --   --   --  1.5*  --   --   --   --  1.5*  --   --    ALKPHOS  --   --   --  242*  --   --   --   --  246*  --   --    AST  --   --   --  3,722*  --   --   --   --  3,722*  --   --    ALT  --   --   --  4,353*  --   --   --   --  >1,000*  --   --     < > = values in this interval not displayed.     CBC  Recent Labs   Lab 12/24/21  0738 12/23/21 2121 12/23/21 2116   WBC 34.7* 31.9*  --    RBC 5.79* 5.98*  --    HGB 12.3 12.8 16.7*   HCT 46.0 47.7* 49*   MCV 79 80  --    MCH 21.2* 21.4*  --    MCHC 26.7* 26.8*  --    RDW 20.2* 20.7*  --     400  --      INR  Recent Labs   Lab 12/23/21 2121   INR 1.93*     Arterial Blood Gas  Recent Labs   Lab 12/24/21 0822 12/24/21 0617 12/23/21 2153   PH 7.25* 7.15* 7.13*   PCO2 73* 88*  --    PO2 56* 100  --    HCO3 32* 31*  --    O2PER 70 70  --      Venous Blood Gas   Recent Labs   Lab 12/24/21  0822 12/24/21 0617 12/23/21 2153 12/23/21 2116   PHV  --   --   --  7.17*   PCO2V  --   --   --  77*   PO2V  --   --   --  28   HCO3V  --   --  30* 28   O2PER 70 70  --   --          No results for input(s): CULT in the last 168 hours.      Recent Results (from the past 48 hour(s))   XR Chest Port 1 View    Narrative    EXAM: XR CHEST PORT 1 VIEW  LOCATION: [facility]  DATE/TIME: 12/23/2021 9:31 PM    INDICATION: Post intubation.  COMPARISON: 4/10/2019.    FINDINGS: ET tube approximately 5 cm above the ron. NG tube passes into the stomach. No pneumothorax. The heart is at the upper limits normal in size. There is no pulmonary edema. There are bibasilar infiltrates.      Impression    IMPRESSION: ET tube 5 cm above the ron.   CT Head w/o Contrast    Narrative    EXAM: CT HEAD W/O CONTRAST, CT CERVICAL SPINE W/O CONTRAST  LOCATION: [facility]  DATE/TIME: 12/23/2021 10:21 PM    INDICATION: Mental  status change, unknown cause, unresponsive  COMPARISON: CT head 03/16/2019  TECHNIQUE:   1) Routine CT Head without IV contrast. Multiplanar reformats. Dose reduction techniques were used.  2) Routine CT Cervical Spine without IV contrast. Multiplanar reformats. Dose reduction techniques were used.    FINDINGS:   HEAD CT:   INTRACRANIAL CONTENTS: No intracranial hemorrhage, extraaxial collection, or mass effect.  No CT evidence of acute infarct. Normal parenchymal attenuation. Normal ventricles and sulci.     VISUALIZED ORBITS/SINUSES/MASTOIDS: No intraorbital abnormality. No paranasal sinus mucosal disease. No middle ear or mastoid effusion.    BONES/SOFT TISSUES: No acute abnormality.    CERVICAL SPINE CT:   VERTEBRA: Slight degenerative anterolisthesis C4 on C5. Alignment is otherwise normal. No acute fracture or posttraumatic subluxation. Marked loss of disc space height C3-C4 through C6-C7. Mild to moderate multilevel facet arthropathy.    CANAL/FORAMINA: Moderate narrowing of the canal C3-C4, C5-C6 and mild changes elsewhere. Moderate to marked bilateral C3-C4 and right C5-C6 degenerative foraminal narrowing.    PARASPINAL: No definite extraspinal abnormality. Visualized lung apices are clear. Scattered areas of gas throughout multiple venous structures likely related to IV access. Endotracheal tube and nasoenteric tube partially visualized.      Impression    IMPRESSION:  HEAD CT:  1.  No acute intracranial abnormality.    CERVICAL SPINE CT:  1.  No CT evidence for acute fracture or post traumatic subluxation.     Cervical spine CT w/o contrast    Narrative    EXAM: CT HEAD W/O CONTRAST, CT CERVICAL SPINE W/O CONTRAST  LOCATION: [facility]  DATE/TIME: 12/23/2021 10:21 PM    INDICATION: Mental status change, unknown cause, unresponsive  COMPARISON: CT head 03/16/2019  TECHNIQUE:   1) Routine CT Head without IV contrast. Multiplanar reformats. Dose reduction techniques were used.  2)  Routine CT Cervical Spine without IV contrast. Multiplanar reformats. Dose reduction techniques were used.    FINDINGS:   HEAD CT:   INTRACRANIAL CONTENTS: No intracranial hemorrhage, extraaxial collection, or mass effect.  No CT evidence of acute infarct. Normal parenchymal attenuation. Normal ventricles and sulci.     VISUALIZED ORBITS/SINUSES/MASTOIDS: No intraorbital abnormality. No paranasal sinus mucosal disease. No middle ear or mastoid effusion.    BONES/SOFT TISSUES: No acute abnormality.    CERVICAL SPINE CT:   VERTEBRA: Slight degenerative anterolisthesis C4 on C5. Alignment is otherwise normal. No acute fracture or posttraumatic subluxation. Marked loss of disc space height C3-C4 through C6-C7. Mild to moderate multilevel facet arthropathy.    CANAL/FORAMINA: Moderate narrowing of the canal C3-C4, C5-C6 and mild changes elsewhere. Moderate to marked bilateral C3-C4 and right C5-C6 degenerative foraminal narrowing.    PARASPINAL: No definite extraspinal abnormality. Visualized lung apices are clear. Scattered areas of gas throughout multiple venous structures likely related to IV access. Endotracheal tube and nasoenteric tube partially visualized.      Impression    IMPRESSION:  HEAD CT:  1.  No acute intracranial abnormality.    CERVICAL SPINE CT:  1.  No CT evidence for acute fracture or post traumatic subluxation.     CT Chest Abdomen Pelvis w/o Contrast    Narrative    EXAM: CT CHEST ABDOMEN PELVIS W/O CONTRAST  LOCATION: [facility]  DATE/TIME: 12/23/2021 10:21 PM    INDICATION: Sepsis  COMPARISON: 03/16/2019  TECHNIQUE: CT scan of the chest, abdomen, and pelvis was performed without IV contrast. Multiplanar reformats were obtained. Dose reduction techniques were used.   CONTRAST: None.    FINDINGS:   LUNGS AND PLEURA: Mosaic attenuation. Bibasilar atelectasis or infiltrate. Trace pleural effusions.    MEDIASTINUM/AXILLAE: No adenopathy or pericardial effusion. Endotracheal  and enteric tube.    CORONARY ARTERY CALCIFICATION: Mild-to-moderate.    HEPATOBILIARY: Cholelithiasis.    PANCREAS: Normal.    SPLEEN: Normal.    ADRENAL GLANDS: Thickening of the adrenal glands.    KIDNEYS/BLADDER: Multiple, bilateral renal calculi. Larger on the left 7 mm. Small amount of air in the left renal collecting system. Boob within the bladder.    BOWEL: Normal caliber.    LYMPH NODES: Bilateral hypodensity along the pelvic sidewall. On the right 4.7 x 2.3 cm series 4 image 256 and on the left 3.9 x 1.7 cm image 262.    VASCULATURE: Atherosclerotic vascular calcification.    PELVIC ORGANS: 1.6 cm fatty lesion within the uterus.    MUSCULOSKELETAL: Postsurgical change lumbar spine. Chronic fracture right proximal femur. Degenerative change osseous structures. Implanted device right ventral abdomen.      Impression    IMPRESSION:  1.  Bibasilar atelectasis or infiltrate and trace pleural effusions.  2.  Small amount of air in the left renal collecting system. Was there recent instrumentation? Correlate for emphysematous pyelitis.  3.  Cholelithiasis. Gallbladder wall thickening not excluded.  4.  Trace amount of free fluid.  5.  Bilateral renal calculi.  6.  1.6 cm fatty lesion within the uterus.  7.  Bilateral oval densities along the pelvic sidewall. Uncertain if these are chronic fluid collection/seroma. Possibility of adenopathy not excluded. Follow-up recommended.   XR Chest Port 1 View    Narrative    EXAM: XR CHEST PORT 1 VIEW  LOCATION: [facility]  DATE/TIME: 12/23/2021 11:15 PM    INDICATION: Line placement  COMPARISON: 12/23/2021      Impression    IMPRESSION: Cardiac enlargement. Endotracheal tube 5.6 cm above ron. Enteric tube below lower aspect of film. Right IJ line in SVC. Bibasilar atelectasis or infiltrate. Small left effusion.   XR Chest Port 1 View    Narrative    EXAM: XR CHEST PORT 1 VIEW  LOCATION: [facility]  DATE/TIME:  12/24/2021 5:16 AM    INDICATION: Endotracheal tube positioning  COMPARISON: 12/23/2021      Impression    IMPRESSION: Endotracheal tube 6.5 cm above ron. Enteric tube and right IJ line. Right perihilar and bibasilar atelectasis or infiltrate. Small left effusion. Atherosclerotic aorta and degenerative change osseous structures.           ==================================================

## 2021-12-24 NOTE — ED PROVIDER NOTES
The patient was signed out to me by Dr. Esteban.  The plan was that if her 4 am repeat Lactic acid is not improved and if she is not making urine at 4 am, then she will be made comfort care and extubated.  Dr. Rodriguez the Intensivist Physician rechecked her and her Lactic Acid is improved at 2.5 and she is making urine, so she will be admitted to the ICU for continued intensive care management.  Dr. Rodriguez spoke with her family.  I signed her out to Dr. Pope.     Concha Hensley MD  12/24/21 0664

## 2021-12-24 NOTE — ED NOTES
Bed: ST  Expected date:   Expected time:   Means of arrival:   Comments:  RED  56F, BVM- unable to obtain airway, BP 71/47

## 2021-12-24 NOTE — ED TRIAGE NOTES
alteredPt had poor appetite this am, given meal replacement.   and son left for a while.    Returned at 3 and pt was at baseline talking appropriately.   saw her sleeping at 4  and then attempted to give po abx.   Pt was unarousable at 1938 (when he tried to give her abx oral meds) per pt .     Giovanni   Coming in    Per EMS pt was unresponsive with saO2 80's BP 70's.  Wounds on legs, BGM 70 given 1/2 amp of D50 and sl improved.  Oxycodone bottle empty pt given narcan  (total 12 mg) increased RR noted, BVM to assist resp because did not tolerate nasal trumpet.   Pt thrashing after narcan,  has MS.

## 2021-12-24 NOTE — PHARMACY-ADMISSION MEDICATION HISTORY
Pharmacy Medication History  Admission medication history interview status for the 12/23/2021  admission is complete. See EPIC admission navigator for prior to admission medications     Location of Interview: Phone  Medication history sources: Patient's family/friend (spouse Fernando), Surescripts and Care Everywhere, pill bottles from home supply, called to verify baclofen pump settings (cell phone for Dr. Griffin [phone], not added in PTA list entry, no clinic phone number provided by [facility] staff).     Significant changes made to the medication list:   Removed albuterol nebulizer (prescribed 2020 for 1 dose)  Adjusted aspirin 81 mg --> 162 mg daily  Adjusted vitamin D 1000 units daily --> 53299 units daily   Added ibuprofen, acetaminophen/dextromethorphan/chlorpheniramine, cough & cold HBP, B complex, baclofen pump     In the past week, patient estimated taking medication this percent of the time: unknown, spouse states patient adherent to regimen but did not have estimate for last doses taken PTA.     Additional medication history information:   Spouse is reliable historian. Patient had multiple bottles present with home supply of amitriptyline, duloxetine, metformin. Oxycodone prescription filled 12/10/21 #30ds, with most tablets remaining in bottle.     Medication reconciliation completed by provider prior to medication history? partially    Time spent in this activity: 45 minutes    Prior to Admission medications    Medication Sig Last Dose Taking? Auth Provider   acetaminophen (TYLENOL) 325 MG tablet Take 2 tablets (650 mg) by mouth every 4 hours as needed for mild pain Unknown at Unknown time Yes Julius Hassan MD   aspirin (ASA) 81 MG chewable tablet Take 162 mg by mouth every morning  Unknown at Unknown time Yes Julius Hassan MD   atorvastatin (LIPITOR) 10 MG tablet Take 1 tablet (10 mg) by mouth daily Unknown at Unknown time Yes Julius Hassan MD   B Complex Vitamins (B COMPLEX PO) Take 1 tablet by  mouth daily Unknown at Unknown time Yes Unknown, Entered By History   baclofen (LIORESAL) 10 MG tablet 10 mg by Per G Tube route 3 times daily as needed for muscle spasms In addition to pump Unknown at Unknown time Yes Unknown, Entered By History   baclofen (LIORESAL) intraTHECAL Internal Pump by Intrathecal route continuous prn (467.6 mcg/24h simple continuous infusion) Dr. Griffin [facility] manages, pump refill due 5/2022. Settings last updated 12/13/2021. 12/24/2021 at Unknown time Yes Unknown, Entered By History   CHLORPHENIRAMINE-ACETAMINOPHEN PO Take 2 tablets by mouth Tablet contains acetaminophen 500 mg, dextromethorphan 15 mg, chlorpheniramine 2 mg Unknown at Unknown time Yes Unknown, Entered By History   Chlorpheniramine-DM (COUGH & COLD HBP) 4-30 MG TABS Take 1 tablet by mouth every 6 hours as needed Unknown at Unknown time Yes Unknown, Entered By History   doxycycline hyclate (VIBRA-TABS) 100 MG tablet Take 1 tablet (100 mg) by mouth 2 times daily for 10 days 12/23/2021 at 15 tablets remain in Rx bottle Yes Julius Hassan MD   DULoxetine (CYMBALTA) 60 MG capsule Take 1 capsule (60 mg) by mouth 2 times daily Unknown at Unknown time Yes Julius Hsasan MD   Hesperidin-Diosmin 250-650 MG TABS 2 tabs daily per wound care Unknown at Unknown time Yes Julius Hassan MD   ibuprofen (ADVIL/MOTRIN) 200 MG tablet Take 200 mg by mouth every 4 hours as needed for mild pain Unknown at Unknown time Yes Unknown, Entered By History   losartan-hydrochlorothiazide (HYZAAR) 50-12.5 MG tablet Take 1 tablet by mouth daily Unknown at Unknown time Yes Julius Hassan MD   metFORMIN (GLUCOPHAGE) 500 MG tablet 1 Tabs QAM and 2 Tabs QPM Unknown at Unknown time Yes Julius Hassan MD   metoprolol succinate ER (TOPROL-XL) 50 MG 24 hr tablet Take 1 tablet (50 mg) by mouth daily Unknown at Unknown time Yes Julius Hassan MD   Multiple Vitamin (MULTI-VITAMIN PO) Take 1 tablet by mouth daily  Unknown at Unknown time Yes Reported, Patient    naloxone (NARCAN) 4 MG/0.1ML nasal spray Spray 1 spray (4 mg) into one nostril alternating nostrils once as needed for opioid reversal Every 2-3 minutes until patient responsive or EMS arrives Unknown at spouse confirmed pt has home supply Yes Julius Hassan MD   omega-3 fatty acids (FISH OIL) 1200 MG capsule Take 1 capsule by mouth daily. Unknown at Unknown time Yes Reported, Patient   oxyCODONE IR (ROXICODONE) 15 MG tablet Take 1 tablet (15 mg) by mouth every 6 hours as needed for moderate to severe pain Limit 4 tablet(s) per day. Unknown at Unknown time Yes Julius Hassan MD   senna-docusate (SENOKOT-S/PERICOLACE) 8.6-50 MG tablet Take 2 tablets by mouth daily as needed for constipation Unknown at Unknown time Yes Julius Hassan MD   Vitamin D3 (CHOLECALCIFEROL) 125 MCG (5000 UT) tablet Take 2 tablets by mouth every morning Unknown at Unknown time Yes Unknown, Entered By History   amitriptyline (ELAVIL) 100 MG tablet Take 1 tablet (100 mg) by mouth At Bedtime  Patient not taking: Reported on 12/24/2021 Not Taking at Unknown time  Julius Hassan MD   blood glucose (ACCU-CHEK KEL) test strip Use to test blood sugar 1 times daily or as directed.   Julius Hassan MD   blood glucose (NO BRAND SPECIFIED) lancets standard Use to test blood sugar 1 times daily or as directed.   Julius Hassan MD   blood glucose calibration (NO BRAND SPECIFIED) solution Use to calibrate blood glucose monitor as needed as directed.   Julius Hassan MD   blood glucose monitoring (ACCU-CHEK KEL PLUS) meter device kit Use to test blood sugar 1 times daily or as directed.   Julius Hassan MD   blood glucose monitoring (ACCU-CHEK MULTICLIX) lancets Use to test blood sugar 1 times daily or as directed.   Julius Hassan MD       The information provided in this note is only as accurate as the sources available at the time of update(s)   Shannan Villela, Kathy

## 2021-12-25 NOTE — PROGRESS NOTES
Critical access hospital ICU RESPIRATORY NOTE        Date of Admission: 12/23/2021    Date of Intubation (most recent):12/23/21    Reason for Mechanical Ventilation:Respiratory failure    Number of Days on Mechanical Ventilation:3    Met Criteria for Spontaneous Breathing Trial:Yes. See previous RT note for details.    Significant Events Today:None    ABG Results:   Recent Labs   Lab 12/25/21  0414 12/24/21  1706 12/24/21  0822 12/24/21  0617 12/23/21  2153   PH  --  7.46* 7.25* 7.15* 7.13*   PCO2  --  43 73* 88*  --    PO2  --  66* 56* 100  --    HCO3  --  30* 32* 31*  --    O2PER 60 50 70 70  --        Current Vent Settings: Ventilation Mode: CMV/AC  (Continuous Mandatory Ventilation/ Assist Control)  FiO2 (%): 60 %  Rate Set (breaths/minute): 26 breaths/min  Tidal Volume Set (mL): 500 mL  PEEP (cm H2O): 5 cmH2O  Oxygen Concentration (%): 60 %  Resp: 26      Skin Assessment:Intact    Plan:Will cont full vent support for now and will assess for weaning readiness daily.    Cordelia Bravo, RT

## 2021-12-25 NOTE — PHARMACY-VANCOMYCIN DOSING SERVICE
"Pharmacy Vancomycin Note  Date of Service 2021  Patient's  1965   56 year old, female    Indication: Pyelonephritis and Sepsis  Day of Therapy: 1  Current vancomycin regimen:  Intermittent dosing per levels  Current vancomycin monitoring method: AUC  Current vancomycin therapeutic monitoring goal: 400-600 mg*h/L    InsightRX Prediction of Current Vancomycin Regimen  20 hours after 1st dose 2500mg, AHE=208.    Current estimated CrCl = Estimated Creatinine Clearance: 33.3 mL/min (A) (based on SCr of 2.69 mg/dL (H)).    Creatinine for last 3 days  2021:  9:21 PM Creatinine 3.50 mg/dL;  9:40 PM Creatinine POCT 4.0 mg/dL  2021:  4:03 AM Creatinine 3.14 mg/dL;  5:05 PM Creatinine 2.69 mg/dL    Recent Vancomycin Levels (past 3 days)  2021:  8:41 PM Vancomycin 16.5 mg/L    Vancomycin IV Administrations (past 72 hours)                   vancomycin 2500 mg in 0.9% NaCl 500 ml intermittent infusion 2,500 mg (mg) 2,500 mg New Bag 21 0054                Nephrotoxins and other renal medications (From now, onward)    Start     Dose/Rate Route Frequency Ordered Stop    21 1300  piperacillin-tazobactam (ZOSYN) 3.375 g vial to attach to  mL bag        Note to Pharmacy: For [facility], [facility] and [facility]: For Zosyn-naive patients, use the \"Zosyn initial dose + extended infusion\" order panel.    3.375 g  over 30 Minutes Intravenous EVERY 6 HOURS 21 0942      21 05  vancomycin place kim - receiving intermittent dosing         1 each Intravenous SEE ADMIN INSTRUCTIONS 21 0528      21 0520  norepinephrine (LEVOPHED) 4 mg in  mL infusion PREMIX         0.01-0.6 mcg/kg/min × 129.7 kg  4.9-291.8 mL/hr  Intravenous CONTINUOUS 21 0516               Contrast Orders - past 72 hours (72h ago, onward)            None          Interpretation of levels and current regimen:  Vancomycin level is reflective of -600    Has serum creatinine changed greater than 50% " in last 72 hours: No but trending down    Urine output:  improving    Renal Function: JULIANNE - watching closely    InsightRX Prediction of Planned New Vancomycin Regimen  Predict SSZ=904 24hr after Vanco 1250mg IV dose.    Plan:  1. Redose Vanco 1250mg IV once tonight, continue intermittent dosing per levels.  2. Vancomycin monitoring method: AUC  3. Vancomycin therapeutic monitoring goal: 400-600 mg*h/L  4. Pharmacy will check vancomycin levels as appropriate in 1-2 Days.  5. Serum creatinine levels will be ordered daily for the first week of therapy and at least twice weekly for subsequent weeks.    Anh Sweet, [facility]

## 2021-12-25 NOTE — PROGRESS NOTES
Spontaneous Breathing Trial    Criteria met for SBT (Y/N): yes    Settings:    PS: 12  PEEP: 5  FiO2: 60%    Spontaneous breathing trial start time:     Measured Parameters:    RR: 9  Tidal volume: 300      Patient tolerance: Not well tolerated. Decrease in RR.    Plan:   Continue on full support.

## 2021-12-25 NOTE — PROGRESS NOTES
MICU Progress Note    Amanda Richardson MRN# [id number]   Age: 56 year old YOB: 1965     Date of Admission: 12/23/2021  Date of Service: 12/25/2021   ==================================================  24 HOUR EVENTS:  A. fib with RVR started last night, was refractory to metoprolol.  Better rate control now on amiodarone  Norepinephrine titrated off overnight  Remains hypocalcemic despite receiving 5 g of calcium gluconate yesterday  WBC and CRP improving        Changes for Today:  2 g calcium chloride this AM with normalization of ionized calcium.  Additional 2 g given this afternoon.  Start scheduled oxy  Start precedex, work on weaning Versed    ==================================================    ASSESSMENT AND PLAN:  56-year-old woman with complex history including non-Hodgkin's lymphoma, multiple sclerosis on baclofen pump, hypertension, DM2, with recurrent infections who was admitted 12/23 for decreased level of consciousness. Possible concern for codeine excess.      Neuro/psych:   -Sedation: Versed, fentanyl  Precedex added 12/25 an attempt to transition from Versed to Precedex  -Oxycodone 10 mg every 4 hours scheduled      ## Altered mental status  Patient was admitted for altered mental status at home. EMS noted codeine bottles nearby when he picked her up, there is no response to Narcan. Urosepsis could also be contributing.    ## Multiple sclerosis  ## Chronic pain  Baclofen pump in place, also has prescription for oxycodone 15 mg every 6 hours as needed    ## Depression  PTA medications include amitriptyline 100 mg nightly, duloxetine 60 mg twice daily  -Holding amitriptyline, continue duloxetine      Pulmonary:   ## Acute hypoxic and hypercarbic respiratory failure  Likely secondary to AMS. Oxygenating well on minimal ventilator settings.  -Lung protective ventilation strategy  -Pressure support trials and extubate when able      Cardiac:  ## Shock, septic  Found to have purulent  urine, requiring low-dose norepinephrine to maintain maps greater than 65.  -Continue norepinephrine as needed    ## Atrial fibrillation  A. fib with RVR started on 12/24 with acute drop in blood pressure.  Has occasionally flipped into NSR with improvement in blood pressure.  Was refractory to 2 doses of 5 mg IV metoprolol so was initiated on amiodarone drip 12/24.  -Continue amiodarone drip    ## Hypertension  ## Hyperlipidemia  Holding PTA atorvastatin, losartan-hydrochlorothiazide, and metoprolol      Renal:   ## Acute on chronic kidney failure  ## CKD one  Baseline creatinine appears to be around 0.5, elevated to 3.5 on admission, now trending down.  -Continue to trend renal function  -Avoid nephrotoxic medications    ## Persistent hypocalcemia  Has remained hypocalcemic despite 5 g calcium gluconate on 12/24.  It may be that in her shock liver states she is not metabolizing calcium gluconate.  Lipase within normal limits  -Transfuse calcium chloride and recheck ionized calcium      Infectious Disease:   ## Septic shock  ## UTI  ## Bacteremia  UA reported as purulent with large leuk esterase and white count above the detection limit with WBC clumps. Culture pending. Lactic acidosis on admission, now improving.  History of frequent UTIs. Historically with pansensitive E. coli, pansensitive  enterococci, and mixed urogenital geovanna  -Await culture results  -Continue antibiotics    ANTIBIOTICS:  Vancomycin 12/23-current  Zosyn 12/23-current    CULTURES:  12/25 blood-no growth to date  12/24 urine culture-pending  12/23 blood cultures-Enterococcus facialis and staph epi  12/23 UA grossly purulent      GI/:   -Nutrition: N.p.o.    ## Transaminitis  Likely shock liver. ALT elevated to over 4000, AST elevated to nearly 4000, alk phos and  total bilirubin also elevated.  Lipase within normal limits.  Transaminases, alk phos, and bilirubin slowly trending towards improvement  -Trend transaminases      Endocrine:   ##  DM2  On Metformin at baseline. Admission hemoglobin A1c 7.1.  -Holding PTA Metformin  -Sliding scale insulin      Heme:   No acute concerns history of non-Hodgkin's lymphoma      Skin:   ##Decubitus ulcers   Coccyx, posterior thigh, and perineal area.  -Walk consult  -Bariatric bed      Prophylaxis:    -GI: PPI    -DVT: Subcutaneous heparin        Attestation:      CCT 45 min excluding procedures        This document was generated with the assistance of voice recognition software. Unintentional transcription errors may occur. Please contact the author for any clarification.    Levi Horan M.D.  Pulmonary & Critical Care  Pager: Click Here to page    ==================================================    PHYSICAL EXAM  Temp:  [97.5  F (36.4  C)-100.8  F (38.2  C)] 99.9  F (37.7  C)  Pulse:  [] 105  Resp:  [0-27] 26  BP: ()/(41-74) 94/47  MAP:  [57 mmHg-261 mmHg] 68 mmHg  Arterial Line BP: ()/() 86/56  FiO2 (%):  [40 %-70 %] 50 %  SpO2:  [91 %-100 %] 95 %    Ventilation Mode: CMV/AC  (Continuous Mandatory Ventilation/ Assist Control)  FiO2 (%): 50 %  Rate Set (breaths/minute): 26 breaths/min  Tidal Volume Set (mL): 500 mL  PEEP (cm H2O): 5 cmH2O  Oxygen Concentration (%): 60 %  Resp: 26      General: Sedated, intubated  Resp: CTAB, no crackles or wheezes  Cardiac: RRR, NS1,S2, No m/r/g  Abdomen: Soft, nondistended. +BS.    Extremities: Chronic venous stasis changes to the bilateral lower extremities with 2+ pitting edema  Skin: Warm and dry, no jaundice or rash        =====================================================  LABORATORY DATA    Labs reviewed by me personally, significant abnormalities detailed in assessment and plan.      ROUTINE ICU LABS (Last four results)  CMP  Recent Labs   Lab 12/25/21  0413 12/25/21  0412 12/25/21  0044 12/24/21  2040 12/24/21  1705 12/24/21  0545 12/24/21  0403 12/24/21  0023 12/24/21  0011 12/23/21  2247 12/23/21  2140 12/23/21  2121     --   --    --  137  --  134  --   --   --   --  135   POTASSIUM 4.2  --   --   --  4.3  --  5.2  5.2  --  4.7  --   --  5.4*   CHLORIDE 101  --   --   --  99  --  96  --   --   --   --  95   CO2 28  --   --   --  30  --  26  --   --   --   --  27   ANIONGAP 10  --   --   --  8  --  12  --   --   --   --  13   * 113* 103* 106* 91   < > 220*   < >  --    < >  --  107*   BUN 75*  --   --   --  77*  --  73*  --   --   --   --  76*   CR 2.15*  --   --   --  2.69*  --  3.14*  --   --   --  4.0* 3.50*   GFRESTIMATED 26*  --   --   --  20*  --  16*  --   --   --  12* 14*   MARY 7.1*  --   --   --  6.7*  --  6.8*  --   --   --   --  7.6*   MAG 2.5*  --   --   --  1.9  --   --   --   --   --   --  2.4*   PHOS 4.5  --   --   --   --   --   --   --   --   --   --  12.2*   PROTTOTAL 6.7*  --   --   --  6.8  --  7.5  --   --   --   --  8.3   ALBUMIN 1.6*  --   --   --  1.7*  --  2.2*  --   --   --   --  2.4*   BILITOTAL 1.5*  --   --   --  1.4*  --  1.5*  --   --   --   --  1.5*   ALKPHOS 218*  --   --   --  220*  --  242*  --   --   --   --  246*   AST 1,315*  --   --   --  2,283*  --  3,722*  --   --   --   --  3,722*   ALT 3,186*  --   --   --  3,596*  --  4,353*  --   --   --   --  >1,000*    < > = values in this interval not displayed.     CBC  Recent Labs   Lab 12/25/21 0413 12/24/21  0738 12/23/21 2121 12/23/21 2116   WBC 26.4* 34.7* 31.9*  --    RBC 5.83* 5.79* 5.98*  --    HGB 12.2 12.3 12.8 16.7*   HCT 42.8 46.0 47.7* 49*   MCV 73* 79 80  --    MCH 20.9* 21.2* 21.4*  --    MCHC 28.5* 26.7* 26.8*  --    RDW 20.3* 20.2* 20.7*  --     415 400  --      INR  Recent Labs   Lab 12/23/21 2121   INR 1.93*     Arterial Blood Gas  Recent Labs   Lab 12/25/21 0414 12/24/21  1706 12/24/21  0822 12/24/21  0617 12/23/21 2153   PH  --  7.46* 7.25* 7.15* 7.13*   PCO2  --  43 73* 88*  --    PO2  --  66* 56* 100  --    HCO3  --  30* 32* 31*  --    O2PER 60 50 70 70  --      Venous Blood Gas   Recent Labs   Lab 12/25/21 0414  12/24/21  1706 12/24/21  0822 12/24/21  0617 12/23/21  2153 12/23/21 2116   PHV 7.43  --   --   --   --  7.17*   PCO2V 46  --   --   --   --  77*   PO2V 47  --   --   --   --  28   HCO3V 30*  --   --   --  30* 28   ATIYA 5.3*  --   --   --   --   --    O2PER 60 50 70 70  --   --          No results for input(s): CULT in the last 168 hours.      Recent Results (from the past 48 hour(s))   XR Chest Port 1 View    Narrative    EXAM: XR CHEST PORT 1 VIEW  LOCATION: [facility]  DATE/TIME: 12/23/2021 9:31 PM    INDICATION: Post intubation.  COMPARISON: 4/10/2019.    FINDINGS: ET tube approximately 5 cm above the ron. NG tube passes into the stomach. No pneumothorax. The heart is at the upper limits normal in size. There is no pulmonary edema. There are bibasilar infiltrates.      Impression    IMPRESSION: ET tube 5 cm above the ron.   CT Head w/o Contrast    Narrative    EXAM: CT HEAD W/O CONTRAST, CT CERVICAL SPINE W/O CONTRAST  LOCATION: [facility]  DATE/TIME: 12/23/2021 10:21 PM    INDICATION: Mental status change, unknown cause, unresponsive  COMPARISON: CT head 03/16/2019  TECHNIQUE:   1) Routine CT Head without IV contrast. Multiplanar reformats. Dose reduction techniques were used.  2) Routine CT Cervical Spine without IV contrast. Multiplanar reformats. Dose reduction techniques were used.    FINDINGS:   HEAD CT:   INTRACRANIAL CONTENTS: No intracranial hemorrhage, extraaxial collection, or mass effect.  No CT evidence of acute infarct. Normal parenchymal attenuation. Normal ventricles and sulci.     VISUALIZED ORBITS/SINUSES/MASTOIDS: No intraorbital abnormality. No paranasal sinus mucosal disease. No middle ear or mastoid effusion.    BONES/SOFT TISSUES: No acute abnormality.    CERVICAL SPINE CT:   VERTEBRA: Slight degenerative anterolisthesis C4 on C5. Alignment is otherwise normal. No acute fracture or posttraumatic subluxation. Marked loss of disc  space height C3-C4 through C6-C7. Mild to moderate multilevel facet arthropathy.    CANAL/FORAMINA: Moderate narrowing of the canal C3-C4, C5-C6 and mild changes elsewhere. Moderate to marked bilateral C3-C4 and right C5-C6 degenerative foraminal narrowing.    PARASPINAL: No definite extraspinal abnormality. Visualized lung apices are clear. Scattered areas of gas throughout multiple venous structures likely related to IV access. Endotracheal tube and nasoenteric tube partially visualized.      Impression    IMPRESSION:  HEAD CT:  1.  No acute intracranial abnormality.    CERVICAL SPINE CT:  1.  No CT evidence for acute fracture or post traumatic subluxation.     Cervical spine CT w/o contrast    Narrative    EXAM: CT HEAD W/O CONTRAST, CT CERVICAL SPINE W/O CONTRAST  LOCATION: [facility]  DATE/TIME: 12/23/2021 10:21 PM    INDICATION: Mental status change, unknown cause, unresponsive  COMPARISON: CT head 03/16/2019  TECHNIQUE:   1) Routine CT Head without IV contrast. Multiplanar reformats. Dose reduction techniques were used.  2) Routine CT Cervical Spine without IV contrast. Multiplanar reformats. Dose reduction techniques were used.    FINDINGS:   HEAD CT:   INTRACRANIAL CONTENTS: No intracranial hemorrhage, extraaxial collection, or mass effect.  No CT evidence of acute infarct. Normal parenchymal attenuation. Normal ventricles and sulci.     VISUALIZED ORBITS/SINUSES/MASTOIDS: No intraorbital abnormality. No paranasal sinus mucosal disease. No middle ear or mastoid effusion.    BONES/SOFT TISSUES: No acute abnormality.    CERVICAL SPINE CT:   VERTEBRA: Slight degenerative anterolisthesis C4 on C5. Alignment is otherwise normal. No acute fracture or posttraumatic subluxation. Marked loss of disc space height C3-C4 through C6-C7. Mild to moderate multilevel facet arthropathy.    CANAL/FORAMINA: Moderate narrowing of the canal C3-C4, C5-C6 and mild changes elsewhere. Moderate to marked  bilateral C3-C4 and right C5-C6 degenerative foraminal narrowing.    PARASPINAL: No definite extraspinal abnormality. Visualized lung apices are clear. Scattered areas of gas throughout multiple venous structures likely related to IV access. Endotracheal tube and nasoenteric tube partially visualized.      Impression    IMPRESSION:  HEAD CT:  1.  No acute intracranial abnormality.    CERVICAL SPINE CT:  1.  No CT evidence for acute fracture or post traumatic subluxation.     CT Chest Abdomen Pelvis w/o Contrast    Narrative    EXAM: CT CHEST ABDOMEN PELVIS W/O CONTRAST  LOCATION: [facility]  DATE/TIME: 12/23/2021 10:21 PM    INDICATION: Sepsis  COMPARISON: 03/16/2019  TECHNIQUE: CT scan of the chest, abdomen, and pelvis was performed without IV contrast. Multiplanar reformats were obtained. Dose reduction techniques were used.   CONTRAST: None.    FINDINGS:   LUNGS AND PLEURA: Mosaic attenuation. Bibasilar atelectasis or infiltrate. Trace pleural effusions.    MEDIASTINUM/AXILLAE: No adenopathy or pericardial effusion. Endotracheal and enteric tube.    CORONARY ARTERY CALCIFICATION: Mild-to-moderate.    HEPATOBILIARY: Cholelithiasis.    PANCREAS: Normal.    SPLEEN: Normal.    ADRENAL GLANDS: Thickening of the adrenal glands.    KIDNEYS/BLADDER: Multiple, bilateral renal calculi. Larger on the left 7 mm. Small amount of air in the left renal collecting system. Bobo within the bladder.    BOWEL: Normal caliber.    LYMPH NODES: Bilateral hypodensity along the pelvic sidewall. On the right 4.7 x 2.3 cm series 4 image 256 and on the left 3.9 x 1.7 cm image 262.    VASCULATURE: Atherosclerotic vascular calcification.    PELVIC ORGANS: 1.6 cm fatty lesion within the uterus.    MUSCULOSKELETAL: Postsurgical change lumbar spine. Chronic fracture right proximal femur. Degenerative change osseous structures. Implanted device right ventral abdomen.      Impression    IMPRESSION:  1.  Bibasilar  atelectasis or infiltrate and trace pleural effusions.  2.  Small amount of air in the left renal collecting system. Was there recent instrumentation? Correlate for emphysematous pyelitis.  3.  Cholelithiasis. Gallbladder wall thickening not excluded.  4.  Trace amount of free fluid.  5.  Bilateral renal calculi.  6.  1.6 cm fatty lesion within the uterus.  7.  Bilateral oval densities along the pelvic sidewall. Uncertain if these are chronic fluid collection/seroma. Possibility of adenopathy not excluded. Follow-up recommended.   XR Chest Port 1 View    Narrative    EXAM: XR CHEST PORT 1 VIEW  LOCATION: [facility]  DATE/TIME: 12/23/2021 11:15 PM    INDICATION: Line placement  COMPARISON: 12/23/2021      Impression    IMPRESSION: Cardiac enlargement. Endotracheal tube 5.6 cm above ron. Enteric tube below lower aspect of film. Right IJ line in SVC. Bibasilar atelectasis or infiltrate. Small left effusion.   XR Chest Port 1 View    Narrative    EXAM: XR CHEST PORT 1 VIEW  LOCATION: [facility]  DATE/TIME: 12/24/2021 5:16 AM    INDICATION: Endotracheal tube positioning  COMPARISON: 12/23/2021      Impression    IMPRESSION: Endotracheal tube 6.5 cm above ron. Enteric tube and right IJ line. Right perihilar and bibasilar atelectasis or infiltrate. Small left effusion. Atherosclerotic aorta and degenerative change osseous structures.           ==================================================

## 2021-12-25 NOTE — PROGRESS NOTES
ETT was advanced 3 cm from 23 cm to 26 cm at the lip per MD order.  12/24/2021  Cordelia Bravo, RT

## 2021-12-26 NOTE — PHARMACY-VANCOMYCIN DOSING SERVICE
Pharmacy Vancomycin Note  Date of Service 2021  Patient's  1965   56 year old, female    Indication: Pyelonephritis  Day of Therapy: 3  Current vancomycin regimen:  Intermittent doses based on levels  Current vancomycin monitoring method: AUC  Current vancomycin therapeutic monitoring goal: 400-600 mg*h/L    InsightRX Prediction of Current Vancomycin Regimen  Loading dose: N/A  Regimen: 1500 mg IV every 18 hours.  Start time: 22:30 on 2021  Exposure target: AUC24 (range)400-600 mg/L.hr   AUC24,ss: 488 mg/L.hr  Probability of AUC24 > 400: 100 %  Ctrough,ss: 14.3 mg/L  Probability of Ctrough,ss > 20: 0 %  Probability of nephrotoxicity (Lodise ROSEMARIE ): 9 %      Current estimated CrCl = Estimated Creatinine Clearance: 69.2 mL/min (A) (based on SCr of 1.36 mg/dL (H)).    Creatinine for last 3 days  2021:  9:21 PM Creatinine 3.50 mg/dL;  9:40 PM Creatinine POCT 4.0 mg/dL  2021:  4:03 AM Creatinine 3.14 mg/dL;  5:05 PM Creatinine 2.69 mg/dL  2021:  4:13 AM Creatinine 2.15 mg/dL  2021:  4:01 AM Creatinine 1.36 mg/dL    Recent Vancomycin Levels (past 3 days)  2021:  8:41 PM Vancomycin 16.5 mg/L  2021:  9:18 PM Vancomycin 14.1 mg/L  2021:  1:00 PM Vancomycin 16.5 mg/L    Vancomycin IV Administrations (past 72 hours)                   vancomycin 1500 mg in 0.9% NaCl 250 ml intermittent infusion 1,500 mg (mg) 1,500 mg New Bag 21    vancomycin 1250 mg in 0.9% NaCl 250 mL intermittent infusion 1,250 mg (mg) 1,250 mg New Bag 21 2357    vancomycin 2500 mg in 0.9% NaCl 500 ml intermittent infusion 2,500 mg (mg) 2,500 mg New Bag 21 0054                Nephrotoxins and other renal medications (From now, onward)    Start     Dose/Rate Route Frequency Ordered Stop    21 1600  vancomycin 1500 mg in 0.9% NaCl 250 ml intermittent infusion 1,500 mg         1,500 mg  over 90 Minutes Intravenous EVERY 18 HOURS 21 1411      21 1400   "piperacillin-tazobactam (ZOSYN) 4.5 g vial to attach to  mL bag        Note to Pharmacy: For [facility], [facility] and [facility]: For Zosyn-naive patients, use the \"Zosyn initial dose + extended infusion\" order panel.    4.5 g  over 30 Minutes Intravenous EVERY 6 HOURS 12/26/21 1001      12/24/21 0520  norepinephrine (LEVOPHED) 4 mg in  mL infusion PREMIX         0.01-0.6 mcg/kg/min × 129.7 kg  4.9-291.8 mL/hr  Intravenous CONTINUOUS 12/24/21 0516               Contrast Orders - past 72 hours (72h ago, onward)            None          Interpretation of levels and current regimen:  Vancomycin level is reflective of -600    Has serum creatinine changed greater than 50% in last 72 hours: No    Urine output:  good urine output    Renal Function: Improving    InsightRX Prediction of Planned New Vancomycin Regimen  Loading dose: N/A  Regimen: 1500 mg IV every 18 hours.  Start time: 22:30 on 12/26/2021  Exposure target: AUC24 (range)400-600 mg/L.hr   AUC24,ss: 488 mg/L.hr  Probability of AUC24 > 400: 100 %  Ctrough,ss: 14.3 mg/L  Probability of Ctrough,ss > 20: 0 %  Probability of nephrotoxicity (Lodise ROSEMARIE 2009): 9 %      Plan:  1. Increase Dose to 1500mg q18h  2. Vancomycin monitoring method: AUC  3. Vancomycin therapeutic monitoring goal: 400-600 mg*h/L  4. Pharmacy will check vancomycin levels as appropriate in 1-3 Days.  5. Serum creatinine levels will be ordered a minimum of twice weekly.    Tiara Montiel, [facility]    "

## 2021-12-26 NOTE — PHARMACY-VANCOMYCIN DOSING SERVICE
"Pharmacy Vancomycin Note  Date of Service 2021  Patient's  1965   56 year old, female    Indication: Pyelonephritis and Sepsis  Day of Therapy: 2  Current vancomycin regimen:  Intermittent dosing per levels  Current vancomycin monitoring method: AUC  Current vancomycin therapeutic monitoring goal: 400-600 mg*h/L    InsightRX Prediction of Current Vancomycin Regimen  AUC 24hr after Vanco 1250mg dose  = 471.    Current estimated CrCl = Estimated Creatinine Clearance: 41.6 mL/min (A) (based on SCr of 2.15 mg/dL (H)).    Creatinine for last 3 days  2021:  9:21 PM Creatinine 3.50 mg/dL;  9:40 PM Creatinine POCT 4.0 mg/dL  2021:  4:03 AM Creatinine 3.14 mg/dL;  5:05 PM Creatinine 2.69 mg/dL  2021:  4:13 AM Creatinine 2.15 mg/dL    Recent Vancomycin Levels (past 3 days)  2021:  8:41 PM Vancomycin 16.5 mg/L  2021:  9:18 PM Vancomycin 14.1 mg/L    Vancomycin IV Administrations (past 72 hours)                   vancomycin 1250 mg in 0.9% NaCl 250 mL intermittent infusion 1,250 mg (mg) 1,250 mg New Bag 21 2357    vancomycin 2500 mg in 0.9% NaCl 500 ml intermittent infusion 2,500 mg (mg) 2,500 mg New Bag 21 0054                Nephrotoxins and other renal medications (From now, onward)    Start     Dose/Rate Route Frequency Ordered Stop    21 2230  vancomycin 1500 mg in 0.9% NaCl 250 ml intermittent infusion 1,500 mg         1,500 mg  over 90 Minutes Intravenous ONCE 21 2211      21 1300  piperacillin-tazobactam (ZOSYN) 3.375 g vial to attach to  mL bag        Note to Pharmacy: For [facility], [facility] and [facility]: For Zosyn-naive patients, use the \"Zosyn initial dose + extended infusion\" order panel.    3.375 g  over 30 Minutes Intravenous EVERY 6 HOURS 21 0942      21 05  vancomycin place kim - receiving intermittent dosing         1 each Intravenous SEE ADMIN INSTRUCTIONS 21 0528      21 0520  norepinephrine (LEVOPHED) 4 " mg in  mL infusion PREMIX         0.01-0.6 mcg/kg/min × 129.7 kg  4.9-291.8 mL/hr  Intravenous CONTINUOUS 12/24/21 0516               Contrast Orders - past 72 hours (72h ago, onward)            None          Interpretation of levels and current regimen:  Vancomycin level is reflective of -600    Has serum creatinine changed greater than 50% in last 72 hours: No but decreasing    Urine output:  good urine output    Renal Function: Improving    InsightRX Prediction of Planned New Vancomycin Regimen  Predict MRB=[id number] in 24hr after Vanco 1500mg dose    Plan:  1. Redose Vanco 1500mg IV x1 & continue intermittent dosing until renal function stabilizes.  2. Vancomycin monitoring method: AUC  3. Vancomycin therapeutic monitoring goal: 400-600 mg*h/L  4. Pharmacy will check vancomycin levels as appropriate in 18-24 hr.  5. Serum creatinine levels will be ordered daily for the first week of therapy and at least twice weekly for subsequent weeks.    Anh Sweet, [facility]

## 2021-12-26 NOTE — PLAN OF CARE
Pt sedated with Fentanyl and Precedex. RASS -2 maintained. Grimaces with cares and repositioning. Tele SR with BBB. VSS on Vent. FiO2 increased to 70%. MAP >65 with low dose Tal-synephrine. Small/thick creamy secretions. Bobo intact, good output. Pt turned every two hours. Will continue to monitor.

## 2021-12-26 NOTE — PROGRESS NOTES
MICU Progress Note    Amanda Richardson MRN# [id number]   Age: 56 year old YOB: 1965     Date of Admission: 12/23/2021  Date of Service: 12/26/2021   ==================================================  24 HOUR EVENTS:  A. fib resolved with amiodarone  Nearly off phenylephrine  I/O status negative, appears to be auto diuresing  WBC continues to trend towards improvement  Creatinine continues to improve    Changes for Today:  Continue current cares      ==================================================    ASSESSMENT AND PLAN:  56-year-old woman with complex history including non-Hodgkin's lymphoma, multiple sclerosis on baclofen pump, hypertension, DM2, with recurrent infections who was admitted 12/23 for decreased level of consciousness. Possible concern for codeine excess.      Neuro/psych:   -Sedation: Precedex, fentanyl  Precedex added 12/25 an attempt to transition from Versed to Precedex  -Oxycodone 10 mg every 4 hours scheduled    ## Altered mental status  Patient was admitted for altered mental status at home. EMS noted codeine bottles nearby when he picked her up, there is no response to Narcan. Urosepsis could also be contributing.    ## Multiple sclerosis  ## Chronic pain  Baclofen pump in place, also has prescription for oxycodone 15 mg every 6 hours as needed    ## Depression  PTA medications include amitriptyline 100 mg nightly, duloxetine 60 mg twice daily  -Holding amitriptyline, continue duloxetine      Pulmonary:   ## Acute hypoxic and hypercarbic respiratory failure  Likely secondary to AMS. Oxygenating well on minimal ventilator settings.  -Lung protective ventilation strategy  -Pressure support trials and extubate when able      Cardiac:  ## Shock, septic  Found to have purulent urine, requiring low-dose norepinephrine to maintain maps greater than 65.  -Initially on norepinephrine, changed to phenylephrine when she went into A. fib    ## Atrial fibrillation  A. fib with RVR  started on 12/24 with acute drop in blood pressure.  Has occasionally flipped into NSR with improvement in blood pressure.  Was refractory to 2 doses of 5 mg IV metoprolol so was initiated on amiodarone drip 12/24.  -Continue amiodarone drip    ## Hypertension  ## Hyperlipidemia  Holding PTA atorvastatin, losartan-hydrochlorothiazide, and metoprolol      Renal:   ## Acute on chronic kidney failure  ## CKD one  Baseline creatinine appears to be around 0.5, elevated to 3.5 on admission, now trending down.  -Continue to trend renal function  -Avoid nephrotoxic medications    ## Hypervolemia  Diffuse pitting edema, however in the setting of acute illness with hypotension on pressors will defer diuretic therapy until she is somewhat recovered.  Additionally, she appears to be auto diuresing at this time.    ## Persistent hypocalcemia  Has remained hypocalcemic despite 5 g calcium gluconate on 12/24.  It may be that in her shock liver states she is not metabolizing calcium gluconate.  Lipase within normal limits  -Transfuse calcium chloride and recheck ionized calcium    ## Hyponatremia  Likely iatrogenic from sodium standing fluids and Zosyn.  - Enteric FW, 60ml Q2h      Infectious Disease:   ## Septic shock  ## UTI  ## Bacteremia  UA reported as purulent with large leuk esterase and white count above the detection limit with WBC clumps.  Lactic acidosis on admission, now improving.  History of frequent UTIs. Historically with pansensitive E. coli, pansensitive  enterococci, and mixed urogenital geovanna  -Continue antibiotics    ANTIBIOTICS:  Vancomycin 12/23-current  Zosyn 12/23-current    CULTURES:  12/25 blood-no growth to date  12/24 urine culture-3 strains of nonlactose fermenting GNR and 1 strain of lactose fermenting GNR  12/23 blood cultures-Enterococcus facialis and 1 set of bottles and staph epi in both sets of bottles  12/23 UA grossly purulent      GI/:   -Nutrition: N.p.o.    ## Transaminitis  Likely shock  liver. ALT elevated to over 4000, AST elevated to nearly 4000, alk phos and  total bilirubin also elevated.  Lipase within normal limits.  Transaminases, alk phos, and bilirubin slowly trending towards improvement  -Trend transaminases      Endocrine:   ## DM2  On Metformin at baseline. Admission hemoglobin A1c 7.1.  -Holding PTA Metformin  -Sliding scale insulin      Heme:   No acute concerns history of non-Hodgkin's lymphoma      Skin:   ##Decubitus ulcers   Coccyx, posterior thigh, and perineal area.  -WOC consult  -Bariatric bed      Prophylaxis:    -GI: PPI    -DVT: Subcutaneous heparin        Attestation:      CCT 45 min excluding procedures        This document was generated with the assistance of voice recognition software. Unintentional transcription errors may occur. Please contact the author for any clarification.    Levi Horan M.D.  Pulmonary & Critical Care  Pager: Click Here to page    ==================================================    PHYSICAL EXAM  Temp:  [95.4  F (35.2  C)-99.9  F (37.7  C)] 99.7  F (37.6  C)  Pulse:  [] 63  Resp:  [24-37] 24  BP: ()/(41-72) 110/53  MAP:  [53 mmHg-144 mmHg] 75 mmHg  Arterial Line BP: ()/() 84/70  FiO2 (%):  [60 %-70 %] 70 %  SpO2:  [88 %-97 %] 88 %    Ventilation Mode: CMV/AC  (Continuous Mandatory Ventilation/ Assist Control)  FiO2 (%): 70 %  Rate Set (breaths/minute): 26 breaths/min  Tidal Volume Set (mL): 500 mL  PEEP (cm H2O): 5 cmH2O  Oxygen Concentration (%): 60 %  Resp: 24      General: Sedated, intubated  Resp: CTAB, no crackles or wheezes  Cardiac: RRR, NS1,S2, No m/r/g  Abdomen: Soft, nondistended. +BS.    Extremities: Chronic venous stasis changes to the bilateral lower extremities with 2+ pitting edema  Skin: Warm and dry, no jaundice or rash        =====================================================  LABORATORY DATA    Labs reviewed by me personally, significant abnormalities detailed in assessment and  plan.      ROUTINE ICU LABS (Last four results)  CMP  Recent Labs   Lab 12/26/21  1135 12/26/21  0823 12/26/21  0401 12/26/21  0400 12/25/21  0823 12/25/21  0413 12/24/21  2040 12/24/21  1705 12/24/21  0545 12/24/21  0403 12/23/21  2140 12/23/21  2121   NA  --   --  145*  --   --  139  --  137  --  134  --  135   POTASSIUM  --   --  3.8  --   --  4.2  --  4.3  --  5.2  5.2   < > 5.4*   CHLORIDE  --   --  109  --   --  101  --  99  --  96  --  95   CO2  --   --  31  --   --  28  --  30  --  26  --  27   ANIONGAP  --   --  5  --   --  10  --  8  --  12  --  13   * 142* 142* 134*   < > 113*   < > 91   < > 220*   < > 107*   BUN  --   --  60*  --   --  75*  --  77*  --  73*  --  76*   CR  --   --  1.36*  --   --  2.15*  --  2.69*  --  3.14*   < > 3.50*   GFRESTIMATED  --   --  45*  --   --  26*  --  20*  --  16*   < > 14*   MARY  --   --  8.9  --   --  7.1*  --  6.7*  --  6.8*  --  7.6*   MAG  --   --   --   --   --  2.5*  --  1.9  --   --   --  2.4*   PHOS  --   --   --   --   --  4.5  --   --   --   --   --  12.2*   PROTTOTAL  --   --  6.3*  --   --  6.7*  --  6.8  --  7.5  --  8.3   ALBUMIN  --   --  1.6*  --   --  1.6*  --  1.7*  --  2.2*  --  2.4*   BILITOTAL  --   --  2.1*  --   --  1.5*  --  1.4*  --  1.5*  --  1.5*   ALKPHOS  --   --  174*  --   --  218*  --  220*  --  242*  --  246*   AST  --   --  427*  --   --  1,315*  --  2,283*  --  3,722*  --  3,722*   ALT  --   --  1,962*  --   --  3,186*  --  3,596*  --  4,353*  --  >1,000*    < > = values in this interval not displayed.     CBC  Recent Labs   Lab 12/26/21  0401 12/25/21  0413 12/24/21  0738 12/23/21 2121   WBC 20.8* 26.4* 34.7* 31.9*   RBC 5.35* 5.83* 5.79* 5.98*   HGB 11.4* 12.2 12.3 12.8   HCT 39.5 42.8 46.0 47.7*   MCV 74* 73* 79 80   MCH 21.3* 20.9* 21.2* 21.4*   MCHC 28.9* 28.5* 26.7* 26.8*   RDW 20.0* 20.3* 20.2* 20.7*    383 415 400     INR  Recent Labs   Lab 12/23/21 2121   INR 1.93*     Arterial Blood Gas  Recent Labs   Lab  12/26/21 0401 12/25/21 0414 12/24/21  1706 12/24/21  0822 12/24/21  0617   PH 7.51*  --  7.46* 7.25* 7.15*   PCO2 41  --  43 73* 88*   PO2 68*  --  66* 56* 100   HCO3 33*  --  30* 32* 31*   O2PER 60 60 50 70 70     Venous Blood Gas   Recent Labs   Lab 12/26/21 0401 12/25/21 0414 12/24/21 1706 12/24/21  0822 12/24/21 0617 12/23/21  2153 12/23/21 2116   PHV  --  7.43  --   --   --   --  7.17*   PCO2V  --  46  --   --   --   --  77*   PO2V  --  47  --   --   --   --  28   HCO3V  --  30*  --   --   --  30* 28   ATIYA  --  5.3*  --   --   --   --   --    O2PER 60 60 50 70   < >  --   --     < > = values in this interval not displayed.         No results for input(s): CULT in the last 168 hours.      No results found for this or any previous visit (from the past 48 hour(s)).        ==================================================

## 2021-12-26 NOTE — CONSULTS
CLINICAL NUTRITION SERVICES  -  ASSESSMENT NOTE      Recommendations Ordered by Registered Dietitian (RD):   Begin TF Vital HP at 10 mL/hr; after 12 hrs increase to 20 mL/hr.  Then increase by 15 mL every 12 hrs to goal 65 mL/hr = 1560 kcals (11 kcal/kg), 136 gm pro (2.1 gm/kg and 103% pro needs), 1304 mL H20, 173 gm CHO, no fiber  Free H20 60 mL every 4 hrs  Certavite daily    Malnutrition:   % Weight Loss:  None noted  % Intake:  </= 50% for >/= 5 days (severe malnutrition) - On 12/27  Subcutaneous Fat Loss:  Unable to determine; suspect none  Muscle Loss:  Unable to determine; suspect none  Fluid Retention:  Mild - doubt nutrition related    Malnutrition Diagnosis: Patient does not meet two of the above criteria necessary for diagnosing malnutrition        REASON FOR ASSESSMENT  Amanda Richardson is a 56 year old female seen by Registered Dietitian for Provider Order - Registered Dietitian to Assess and Order TF per Medical Nutrition Therapy Protocol      NUTRITION HISTORY  - Unable to obtain nutrition history as patient intubated and no family available.  Back in [facility] stay when pt was intubated, she was on Peptamen Intense VHP TF product.  Eventually she was transitioned to a Moderate Consistent CHO diet (1784-7104 kcals per day).   No food allergies/intolerances noted.    CURRENT NUTRITION ORDERS  Diet Order:     NPO     Current Intake/Tolerance:  NPO x 4 days    2/23:  Intubated  12/23:  Abd CT  1.  Bibasilar atelectasis or infiltrate and trace pleural effusions.  2.  Small amount of air in the left renal collecting system.  3.  Cholelithiasis. Gallbladder wall thickening not excluded.  4.  Trace amount of free fluid.  5.  Bilateral renal calculi.  6.  1.6 cm fatty lesion within the uterus.  7.  Bilateral oval densities along the pelvic sidewall. Uncertain if these are chronic fluid collection/seroma. Possibility of adenopathy not excluded.   12/24:  OG placed    NUTRITION FOCUSED PHYSICAL ASSESSMENT FOR  "DIAGNOSING MALNUTRITION)  No:  Unable                Obtained from Chart/Interdisciplinary Team:  Pressure Injury   --> Decubitus ulcers (Coccyx, posterior thigh, and perineal area)  --> Per WOCN 12/24:  Pt is has very poor skin conditioning to posterior side of buttocks, coccyx and thighs all present on admission and due to mixed etiology of incontinence and being sedentary. Unable to fully rule out pressure. Pt needing good continence care and skin care and with strict PIP skin should clear up quite quickly. Pt only has 1 measurable area at 12 O'clock perianal and rest has pinpoint areas of bleeding on and off  Edema - 2+ pitting    ANTHROPOMETRICS  Height: 5' 9.016\"  Weight: 143.8 kg (317 lbs 0 oz) --> Note markedly higher than weight taken on 12/25 (131.7 kg)  Body mass index is 46.79 kg/m .  Weight Status:  Obesity Grade III BMI >40  IBW: 66 kg  % IBW: 218%  Weight History:  Weight up 29.5 kg from almost 2 years ago.    Wt Readings from Last 10 Encounters:   12/26/21 143.8 kg (317 lb)   01/29/20 114.3 kg (252 lb)   07/29/19 114.3 kg (252 lb)   07/17/19 114.3 kg (252 lb)   06/21/19 114.3 kg (252 lb)   04/14/19 114.4 kg (252 lb 3.2 oz)   04/10/19 114.8 kg (253 lb)   04/09/19 114.8 kg (253 lb)   04/04/19 112.3 kg (247 lb 9.6 oz)   03/31/19 112.5 kg (248 lb 1.6 oz)     LABS  Labs reviewed  Na 145 (H)  BUN 60 (H)  Cr 1.36 (H) - JULIANNE on CKD  Liver labs elevated - due to shock liver    MEDICATIONS  Medications reviewed  Medium Resistant sliding scale insulin   Amiodorone  Precedex  Phenylephrine    ASSESSED NUTRITION NEEDS PER APPROVED PRACTICE GUIDELINES:    Dosing Weight:  143.8 kg (energy), 66 kg (protein)  Estimated Energy Needs:  7950-8698 kcals (11-14 Kcal/Kg)  Justification: obese and vented  Estimated Protein Needs: 112-132 grams protein (1.7-2 g pro/Kg)  Justification: wound healing, hypercatabolism with critical illness, obesity guidelines  and CKD  Estimated Fluid Needs:  4039-2245 mL (1 " mL/Kcal)  Justification: maintenance    MALNUTRITION:  % Weight Loss:  None noted  % Intake:  </= 50% for >/= 5 days (severe malnutrition) - On 12/27  Subcutaneous Fat Loss:  Unable to determine; suspect none  Muscle Loss:  Unable to determine; suspect none  Fluid Retention:  Mild - doubt nutrition related    Malnutrition Diagnosis: Patient does not meet two of the above criteria necessary for diagnosing malnutrition    NUTRITION DIAGNOSIS:  Inadequate protein intake related to intubation as evidenced by NPO x 4 days, meeting 0% needs, and TF planned    NUTRITION INTERVENTIONS  Recommendations / Nutrition Prescription  Begin TF Vital HP at 10 mL/hr; after 12 hrs increase to 20 mL/hr.  Then increase by 15 mL every 12 hrs to goal 65 mL/hr = 1560 kcals (11 kcal/kg), 136 gm pro (2.1 gm/kg and 103% pro needs), 1304 mL H20, 173 gm CHO, no fiber  Free H20 60 mL every 4 hrs  Certavite daily     Implementation  Nutrition education: Not appropriate at this time due to patient condition  EN Composition, EN Schedule, Feeding Tube Flush and Multivitamin/Mineral - Orders entered in Epic as above    Nutrition Goals  TF Vital HP at 65 mL/hr will meet % estimated needs    MONITORING AND EVALUATION:  Progress towards goals will be monitored and evaluated per protocol and Practice Guidelines    Florecita Shaffer, RD, LD, CNSC

## 2021-12-26 NOTE — PLAN OF CARE
Pt sedated and grimaces to all cares.  Pt bites down on ETT when being turned.  Pt in first degree AV block with BBB this shift.  Pt with thick creamy/tan secretions from ETT.  Pt with rhonci.  Pt abdomen soft, tolerating tube feeds.  Pt with adequate urine output.  Pt skin dry, flaky, and reddened under all folds, excoriated in perineum, buttocks.  sween lotion applied, pt turned every two hours.

## 2021-12-27 NOTE — PROGRESS NOTES
[facility]   CRITICAL CARE NOTE     Amanda Richardson MRN: [id number]  1965  Date of Admission:12/23/2021          Problem List:   1. Acute respiratory failure   2. Severe sepsis   3. Afib   4. MS      24-Hour Goals   1. Wean sedatives  2. Wean FiO2 to 50% if able   3. Stop vancomycin      Overnight Events   No acute issues overnight       Lines/Tubes/Draines/Devices   .  Peripheral IV 12/23/21 Anterior;Right Upper forearm (Active)   Site Assessment [facility] 12/27/21 0600   Line Status Saline locked 12/27/21 0600   Phlebitis Scale 0-->no symptoms 12/27/21 0600   Infiltration Scale 0 12/27/21 0600   Number of days: 4       Peripheral IV 12/23/21 Anterior;Distal;Left Upper arm (Active)   Site Assessment [facility] 12/27/21 0600   Line Status Saline locked 12/27/21 0600   Phlebitis Scale 0-->no symptoms 12/27/21 0600   Infiltration Scale 0 12/27/21 0600   Number of days: 4       Arterial Line 12/24/21 Femoral (Active)   Site Assessment [facility] 12/27/21 0600   Line Status Positional 12/27/21 0600   Art Line Waveform Dampened;Square wave test performed 12/27/21 0600   Art Line Interventions Zeroed and calibrated 12/27/21 0600   Color/Movement/Sensation Capillary refill less than 3 sec 12/27/21 0600   Line Necessity Yes, meets criteria 12/27/21 0600   Dressing Type Securing device 12/27/21 0600   Dressing Status Clean, dry, intact 12/27/21 0600   Dressing Intervention Dressing changed/new dressing 12/24/21 0930   Dressing Change Due 12/30/21 12/24/21 0930   Number of days: 3       CVC TRIPLE LUMEN Right Internal jugular (Active)   Site Assessment [facility] 12/27/21 0600   Dressing Type Chlorhexidine sponge;Transparent 12/27/21 0600   Dressing Status other (comment) 12/26/21 2100   Dressing Intervention new dressing;dressing changed 12/26/21 2100   Dressing Change Due 12/02/21 12/26/21 2100   Line Necessity yes, meets criteria 12/25/21 0800   Blue - Status infusing 12/27/21 0600   Brown - Status infusing 12/27/21 0600    White - Status infusing 12/27/21 0600   Infiltration? no 12/27/21 0600   Infiltration Scale 0 12/26/21 0400   Number of days: 4       ETT Oral 7.5 mm (Active)   Secured at (cm) 25 cm 12/27/21 0756   Measured from Lips 12/27/21 0756   Position Right 12/27/21 0600   Secured by Commercial tube kim 12/27/21 0203   Bite Block None Present 12/27/21 0203   Site Appearance Clean;Dry 12/27/21 0203   Cuff Assessment Minimal occluding volume 12/27/21 0203   Safety Measures Manual resuscitator at bedside 12/27/21 0756   Number of days: 4       NG/OG/NJ Tube Orogastric 10 fr Right mouth (Active)   Site Description WDL 12/27/21 0600   Status Enteral Feedings 12/27/21 0600   Drainage Appearance Dark Red;Brown 12/25/21 0400   Placement Confirmation Hilda unchanged 12/27/21 0600   Hilda (cm marking) at nare/mouth 64 cm 12/27/21 0600   Intake (ml) 50 ml 12/27/21 0400   Flush/Free Water (mL) 60 mL 12/27/21 0400   Residual (mL) 40 mL 12/26/21 1953   Container Amount 300 mL 12/25/21 0400   Output (ml) 150 ml 12/25/21 0400   Number of days: 3       Urethral Catheter Straight-tip 16 fr (Active)   Tube Description Positional 12/27/21 0600   Catheter Care Done;Catheter wipes 12/26/21 1400   Collection Container Standard;Patent 12/27/21 0600   Securement Method Securing device (Describe) 12/27/21 0600   Rationale for Continued Use Strict 1-2 Hour I&O 12/27/21 0600   Urine Output 60 mL 12/27/21 0543   Number of days: 4       Wound Perineum Moisture damage;Ulceration (Active)   Wound Bed Moist;Pink;Bleeding 12/27/21 0400   Breann-wound Assessment Edema;Bleeding 12/27/21 0400   Drainage Amount Scant 12/27/21 0400   Drainage Color/Characteristics Serosanguineous 12/27/21 0400   Wound Care/Cleansing Barrier applied ;Wound cleanser 12/26/21 0000   Dressing Per Plan of Care 12/27/21 0400   Number of days: 3       Wound Pelvis Abrasion(s) (Active)   Wound Bed Red;Edema 12/27/21 0400   Breann-wound Assessment Bleeding;Edema 12/27/21 0400    Drainage Amount Scant 12/27/21 0400   Drainage Color/Characteristics Sanguinous 12/27/21 0400   Wound Care/Cleansing Wound cleanser 12/25/21 0800   Dressing Per Plan of Care 12/27/21 0400   Number of days: 3       Wound (used by OP [facility] only) 07/09/19 1527 Left coccyx pressure injury (Active)   Number of days: 902          ICU Prophylaxis:   1. DVT: Hep Subq/mechanical  2. VAP: HOB 30 degrees, chlorhexidine rinse  3. Stress Ulcer: PPI  4. Restraints: Nonviolent soft two point restraints required and necessary for patient safety and continued cares and good effect as patient continues to pull at necessary lines, tubes despite education and distraction. Will readdress daily.   5. IV Access - central access required and necessary for continued patient cares  6. Feeding - enteral      Medications:       amiodarone  400 mg Oral BID     [START ON 1/3/2022] amiodarone  400 mg Oral Daily     chlorhexidine  15 mL Mouth/Throat Q12H     DULoxetine  60 mg Oral or Feeding Tube BID     heparin ANTICOAGULANT  5,000 Units Subcutaneous Q8H     insulin aspart  1-6 Units Subcutaneous Q4H     multivitamins w/minerals  15 mL Per Feeding Tube Daily     oxyCODONE  10 mg Oral Q4H     pantoprazole  40 mg Per Feeding Tube QAM AC    Or     pantoprazole (PROTONIX) IV  40 mg Intravenous QAM AC     piperacillin-tazobactam  4.5 g Intravenous Q6H     sodium chloride (PF)  3 mL Intracatheter Q8H     vancomycin  1,500 mg Intravenous Q18H     baclofen (LIORESAL) intraTHECAL Internal Pump, dextrose, dextrose, glucose **OR** dextrose **OR** glucagon, fentaNYL, fentaNYL, lidocaine 4%, lidocaine, lidocaine (buffered or not buffered), midazolam, sodium chloride (PF)      Review of Systems:   Unable to obtain due to critical illness   CONSTITUTIONAL: negative for fever, chills, change in weight  INTEGUMENTARY/SKIN: no rash or obvious new lesions  ENT/MOUTH: no sore throat, new sinus pain or nasal drainage  RESP: see interval history  CV: negative for  chest pain, palpitations or peripheral edema  GI: no nausea, vomiting, change in stools  : no dysuria  MUSCULOSKELETAL: no myalgias, arthralgias  ENDOCRINE: blood sugars with adequate control  PSYCHIATRIC: mood stable  LYMPHATIC: no new lymphadenopathy  HEME: no bleeding or easy bruisability  NEURO: no numbness, weakness, headaches         Physical Exam:   Temp:  [99.3  F (37.4  C)-100.2  F (37.9  C)] 99.7  F (37.6  C)  Pulse:  [53-75] 59  Resp:  [16-61] 26  BP: ()/(48-62) 119/56  MAP:  [16 mmHg-93 mmHg] 74 mmHg  Arterial Line BP: ()/(34-71) 113/54  FiO2 (%):  [70 %] 70 %  SpO2:  [85 %-95 %] 94 %    Intake/Output Summary (Last 24 hours) at 12/27/2021 0854  Last data filed at 12/27/2021 0600  Gross per 24 hour   Intake 2330.11 ml   Output 1960 ml   Net 370.11 ml     Wt Readings from Last 4 Encounters:   12/26/21 143.8 kg (317 lb)   01/29/20 114.3 kg (252 lb)   07/29/19 114.3 kg (252 lb)   07/17/19 114.3 kg (252 lb)     Arterial Line BP: ()/(34-71) 113/54  MAP:  [16 mmHg-93 mmHg] 74 mmHg  BP - Mean:  [63-81] 78  Ventilation Mode: CMV/AC  (Continuous Mandatory Ventilation/ Assist Control)  FiO2 (%): 70 %  Rate Set (breaths/minute): 26 breaths/min  Tidal Volume Set (mL): 500 mL  PEEP (cm H2O): 5 cmH2O  Oxygen Concentration (%): 80 %  Resp: 26    Recent Labs   Lab 12/27/21  0600 12/26/21  0401 12/25/21  0414 12/24/21  1706 12/24/21  0822   PH 7.51* 7.51*  --  7.46* 7.25*   PCO2 41 41  --  43 73*   PO2 57* 68*  --  66* 56*   HCO3 33* 33*  --  30* 32*   O2PER 70 60 60 50 70       GEN: no acute distress   HEENT: head ncat, sclera anicteric, OP patent, trachea midline   PULM: unlabored synchronous with vent, clear anteriorly    CV/COR: RRR S1S2 no gallop,  No rub, no murmur  ABD: soft nontender, hypoactive bowel sounds, no mass  EXT:  Edema   warm  NEURO: grossly intact  SKIN: no obvious rash      Assessment and plan :     Neurology/Psychiatry/Pain/Sedation:   1. Sedation for vent synchrony. RASS -4 on  precedex@0.8 and fentanyl@50.   2. MS. Has baclofen pump. Check baclofen level today  3. Chronic pain. PTA duloxetine and oral oxycodone@10q4    Cardiovascular/Hemodynamics/Pulm/Vent/GI/Renal:   1. Acute respiratory failure, JULIANNE, Afib, septic shock, leukocytosis 2/2 UTI. ON 70% FiO2 and PEEP 5. Plan to wean FiO2 to 50% today. Afib rate controlled on PO amiodarone. Tal has been off. Leukocytosis is improving on zosyn and vancomycin. Ok to stop vanco since UA is GNR and sputum cx are PCN sensitive. JULIANNE improving, remains non-oliguric.    2. ICU glucose protocol and lantus   3. Enteral nutrition     Disposition/Code Status/Other  1. Critically ill  2. Code: DNR     I have personally reviewed the daily labs, imaging studies, cultures and discussed the case with referring physician and consulting physicians.     This patient is critically ill and I have provided 30 minutes of critical care time (excluding procedures) on December 27, 2021.     Jerrod Esteban MD, MHA   of Medicine  Interventional Pulmonary  Department of Pulmonary, Allergy, Critical Care and Sleep Medicine   [facility]  Pager: [phone]   Office: [phone]  Email: [email or web address]    ROUTINE ICU LABS (Last four results)  CMP  Recent Labs   Lab 12/27/21  0830 12/27/21  0524 12/27/21  0518 12/27/21  0049 12/26/21  0823 12/26/21  0401 12/25/21  0823 12/25/21  0413 12/24/21  2040 12/24/21  1705 12/23/21  2140 12/23/21  2121   NA  --   --  146*  --   --  145*  --  139  --  137   < > 135   POTASSIUM  --   --  3.6  --   --  3.8  --  4.2  --  4.3   < > 5.4*   CHLORIDE  --   --  112*  --   --  109  --  101  --  99   < > 95   CO2  --   --  31  --   --  31  --  28  --  30   < > 27   ANIONGAP  --   --  3  --   --  5  --  10  --  8   < > 13   * 119* 124* 135*   < > 142*   < > 113*   < > 91   < > 107*   BUN  --   --  54*  --   --  60*  --  75*  --  77*   < > 76*   CR  --   --  1.19*  --   --  1.36*  --  2.15*  --   2.69*   < > 3.50*   GFRESTIMATED  --   --  53*  --   --  45*  --  26*  --  20*   < > 14*   MARY  --   --  8.0*  --   --  8.9  --  7.1*  --  6.7*   < > 7.6*   MAG  --   --  2.2  --   --   --   --  2.5*  --  1.9  --  2.4*   PHOS  --   --  3.9  --   --   --   --  4.5  --   --   --  12.2*   PROTTOTAL  --   --  6.6*  --   --  6.3*  --  6.7*  --  6.8   < > 8.3   ALBUMIN  --   --  1.7*  --   --  1.6*  --  1.6*  --  1.7*   < > 2.4*   BILITOTAL  --   --  2.1*  --   --  2.1*  --  1.5*  --  1.4*   < > 1.5*   ALKPHOS  --   --  163*  --   --  174*  --  218*  --  220*   < > 246*   AST  --   --  188*  --   --  427*  --  1,315*  --  2,283*   < > 3,722*   ALT  --   --  1,356*  --   --  1,962*  --  3,186*  --  3,596*   < > >1,000*    < > = values in this interval not displayed.     CBC  Recent Labs   Lab 12/27/21  0518 12/26/21  0401 12/25/21  0413 12/24/21  0738   WBC 17.8* 20.8* 26.4* 34.7*   RBC 5.29* 5.35* 5.83* 5.79*   HGB 11.4* 11.4* 12.2 12.3   HCT 39.8 39.5 42.8 46.0   MCV 75* 74* 73* 79   MCH 21.6* 21.3* 20.9* 21.2*   MCHC 28.6* 28.9* 28.5* 26.7*   RDW 20.5* 20.0* 20.3* 20.2*    317 383 415     INR  Recent Labs   Lab 12/23/21  2121   INR 1.93*     Arterial Blood Gas  Recent Labs   Lab 12/27/21  0600 12/26/21  0401 12/25/21  0414 12/24/21  1706 12/24/21  0822   PH 7.51* 7.51*  --  7.46* 7.25*   PCO2 41 41  --  43 73*   PO2 57* 68*  --  66* 56*   HCO3 33* 33*  --  30* 32*   O2PER 70 60 60 50 70

## 2021-12-27 NOTE — PLAN OF CARE
Neuros unchanged. Purposeful movement to BLEs and not BLEs. Localizes pain. Vent settings unchanged, tolerating with Fentany gtt and Precedex gtt. Phenylephrine gtt at 0.1 mcg for MAP>65. OG to TF, advanced to 20m/hr at 0700 with q4 60m flushes. Bobo with approx 1500ml output since yesterday afternoon.Sched Oxycodone for pain. Tele SR to SB with BBB, HR 50s-60s. Temp .1 oral.  Amiodarone gtt stopped and pt started on PO dose given x1. R internal jugular infusing, WNL. R femoral ART line positional. 2 PIV SL.  visited and was updated.

## 2021-12-28 NOTE — PROGRESS NOTES
"[facility]  WOC Nurse Inpatient Wound Assessment     Reason for consultation: Evaluate and treat \"bilateral posterior thighs, buttocks, coccyx, vaginal area\" \"coccyx/sacrum, groin\"    Assessment  ABD folds skin breakdown due to Moisture Associated Skin Damage (MASD) and Fungal    Buttock skin breakdown due to moisture, friction, pressure, chronic illness     Posterior thighs and groin and perineal skin breakdown due to MASD     Treatment Plan     Wound care  EVERY SHIFT        Comments: Location: buttock, posterior thighs, groin, perineal   Care: provided qshift by primary RN and/or PCA   1. Cleanse with warm water and baby shampoo and wash cloths (or with \"Medline sooth and cool no rinse shampoo and body wash\" and Rg soft wash cloths). Then dry well   2. Apply 3M Cavilon No Sting barrier film to all damaged skin, hold skin folds apart for 30 seconds to dry         Wound care  EVERY SHIFT        Comments: Location: abd folds   Care: provided qshift by primary RN and/or PCA   1. Cleanse abd folds with warm water and baby shampoo and wash cloths (or with \"Medline soothe and cool no rinse shampoo and body wash\" and Rg soft wash cloths). Then dry well   2. Apply Interdry fabric to folds, per manufacture instructions on box. Cut pieces Large, need to be large enough to have at least 2\" of fabric hanging outside the skin fold while in place. Ok to use each piece up to 5 days   3. No powders or creams at all in to the abd folds, just interdry placed at the base of the skinfold wicking the moisture and treating the skin with Ag (silver) impregnated in the fabric          Orders Updated  Recommended provider order: None, at this time  WOC Nurse follow-up plan: weekly  Nursing to notify the Provider(s) and re-consult the WOC Nurse if wound(s) deteriorates or new skin concern.    Patient History  According to provider note(s):  \"56-year-old woman with complex history including non-Hodgkin's " "lymphoma, multiple sclerosis on baclofen pump, hypertension, DM2, with recurrent infections who was admitted 12/23 for decreased level of consciousness. Possible concern for codeine excess.\"    Objective Data  Containment of urine/stool: Incontinence Protocol, Incontinent pad in bed and Indwelling catheter    Active Diet Order:  Orders Placed This Encounter      NPO for Medical/Clinical Reasons Except for: Meds, Ice Chips    Output:   I/O last 3 completed shifts:  In: 2525.6 [I.V.:1465.6; NG/GT:310]  Out: 2290 [Urine:2290]    Risk Assessment:   Sensory Perception: 2-->very limited  Moisture: 3-->occasionally moist  Activity: 1-->bedfast  Mobility: 2-->very limited  Nutrition: 3-->adequate  Friction and Shear: 1-->problem  Issac Score: 12                          Labs: Recent Labs   Lab 12/28/21  0410 12/27/21  0518 12/24/21  1705 12/24/21  0738 12/24/21  0403 12/23/21  2121   ALBUMIN 1.6* 1.7*   < >  --    < > 2.4*   HGB 12.0 11.4*   < > 12.3  --  12.8   INR  --   --   --   --   --  1.93*   WBC 14.9* 17.8*   < > 34.7*  --  31.9*   A1C  --   --   --  7.1*  --   --    CRP  --  82.6*   < >  --   --  159.0*    < > = values in this interval not displayed.       Physical Exam  Skin inspection: focused abd folds, perineal, groin, buttock, posterior thighs        Wound Location:  Buttocks, posterior thighs   Date of last photo:  12-28  Wound History: per [facility] charting 2019, patient with bilat buttock unstageable pressure injuries. Per patient spouse, MASD and chronic pressure/friction skin breakdown also noted at home and at TCU prior to admission   Measurements (length x width x depth, in cm):  Generalized buttock and posterior thighs, bilateral buttock/sacrum 15cm x 9cm x 0.2+  Wound Base:  slough  Tunneling: N/A   Undermining: N/A  Palpation of the wound bed: firm   Periwound skin: superficial erosion, with sacrring noted   Color: pink and purple  Temperature: normal   Drainage: none  Description of drainage: none  Odor: " none  Pain: nonverbal indicator of pain present with turn, absent at rest     Wound Location:  abd folds   Date of last photo:  12-28  Wound History: present on admission   Measurements (length x width x depth, in cm):  Generalized to bilt abd fold and groin fold   Wound Base:  Dermis, moisture splits   Tunneling: N/A  Undermining: N/A  Palpation of the wound bed: normal   Periwound skin: rash and superficial erosion  Color: pink and red  Temperature: normal   Drainage: small  Description of drainage: serosanguinous  Odor: mild  Pain: nonverbal indicators absent     Interventions  Current support surface: Standard  Low air loss mattress  Current off-loading measures: Pillows under calves, Pillows and Wedge positioning system  Visual inspection of wound(s) completed  Wound Care: done per plan of care  Supplies: placed at the bedside, floor stock, discussed with RN and discussed with family  Education provided: importance of repositioning, plan of care, wound progress, Infection prevention , Moisture management, Hygiene and Off-loading pressure  Discussed plan of care with Patient, Family and Nurse    Eva BECERRA

## 2021-12-28 NOTE — PLAN OF CARE
Patient remains intubated and sedated with fentanyl and precedex throughout shift. Vent settings increased to FiO2 to 100% and PEEP to 10. Patient tolerating. Patient remains vitally stable throughout shift. Tal on/off throughout shift to maintain MAP greater than 60. Patient is very edematous throughout extremities. Patient putting out adequate amounts of urine output. Tube feeds continue to infuse, patient tolerating. Skin is very excoriated, scabby, thick on legs and groin area. Cleaned and lotioned very well. Skin folds are slightly opened and bleeding, cleaned, dried, powdered and inter dry placed in folds,  Per wound care order. Patient turned q2h. Patient receiving antibiotics for infection. Will continue to wean sedation/vent settings as able. Will continue to monitor and assess patient.

## 2021-12-28 NOTE — PLAN OF CARE
Neuro: sedated Dex, fentanyl titrated down, RASS -2, BRENDON. Does not follow commands appropriately. Pt had low grade fever, discussed with MD and not able to order Tylenol/ibuprofen. Temperature normalized  by applying ice packs, decreased room temperature.  CV: SB-NSR. BP controlled on Tal, ggt stopped in AM.  Respiratory: Remains full vent support.  : Bobo care done, Adequate UOP/ tasha. Perennial skin care done. WOC consult requested.  Pain: Fentanyl for pain and repositioned.   Activity: Turns q2h, blanchable redness to bottom.Multiple skin tears/abrasions, WOC consult requested.  Lines: Femoral art line, internal jugular, Bobo, NG  Family: son was present at bedside.

## 2021-12-28 NOTE — PROGRESS NOTES
[facility]   CRITICAL CARE NOTE     Amanda Richardson MRN: [id number]  1965  Date of Admission:12/23/2021          Problem List:   1. Acute respiratory failure   2. Severe sepsis   3. Afib   4. MS       24-Hour Goals   1. Wean IV sedatives   2. Wean FiO2 as able       Overnight Events   FiO2 still 100%. No other acute issues overnight         Lines/Tubes/Draines/Devices   .  Peripheral IV 12/23/21 Anterior;Right Upper forearm (Active)   Site Assessment Ecchymotic 12/28/21 0800   Line Status Saline locked 12/28/21 0800   Phlebitis Scale 0-->no symptoms 12/28/21 0800   Infiltration Scale 0 12/28/21 0800   Number of days: 5       Peripheral IV 12/23/21 Anterior;Distal;Left Upper arm (Active)   Site Assessment [facility] 12/28/21 0800   Line Status Saline locked 12/28/21 0800   Phlebitis Scale 0-->no symptoms 12/28/21 0800   Infiltration Scale 0 12/28/21 0800   Number of days: 5       Arterial Line 12/24/21 Femoral (Active)   Site Assessment [facility] 12/28/21 0800   Line Status Positional 12/28/21 0800   Art Line Waveform Dampened 12/28/21 0800   Art Line Interventions Zeroed and calibrated;Leveled;Connections checked and tightened;Flushed per protocol 12/28/21 0800   Color/Movement/Sensation Capillary refill less than 3 sec 12/28/21 0800   Line Necessity Yes, meets criteria 12/28/21 0800   Dressing Type Transparent 12/28/21 0800   Dressing Status Clean, dry, intact 12/28/21 0800   Dressing Intervention Dressing changed/new dressing 12/24/21 0930   Dressing Change Due 12/30/21 12/24/21 0930   Number of days: 4       CVC TRIPLE LUMEN Right Internal jugular (Active)   Site Assessment [facility] 12/28/21 0800   Dressing Type Chlorhexidine sponge 12/28/21 0800   Dressing Status other (comment) 12/26/21 2100   Dressing Intervention new dressing;dressing changed 12/26/21 2100   Dressing Change Due 12/02/21 12/26/21 2100   Line Necessity yes, meets criteria 12/25/21 0800   Blue - Status infusing 12/28/21 0800   Brown -  Status infusing 12/28/21 0800   White - Status infusing 12/28/21 0800   Infiltration? no 12/27/21 1600   Infiltration Scale 0 12/26/21 0400   Number of days: 5       ETT Oral 7.5 mm (Active)   Secured at (cm) 26 cm 12/28/21 0809   Measured from Lips 12/28/21 0809   Position Left 12/28/21 0800   Secured by Commercial tube kim 12/28/21 0809   Bite Block None Present 12/28/21 0809   Site Appearance Clean;Dry 12/28/21 0809   Cuff Assessment Minimal occluding volume 12/28/21 0809   Safety Measures Manual resuscitator at bedside 12/28/21 0809   Number of days: 5       NG/OG/NJ Tube Nasoduodenal 10 fr Right nostril (Active)   Site Description WDL 12/28/21 1000   Status Enteral Feedings 12/28/21 0800   Placement Confirmation X-ray 12/28/21 0800   [place] (cm marking) at nare/mouth 94 cm 12/28/21 0800   Intake (ml) 40 ml 12/28/21 0800   Flush/Free Water (mL) 60 mL 12/28/21 0400   Residual (mL) 0 mL 12/28/21 0800   Number of days: 0       Urethral Catheter Straight-tip 16 fr (Active)   Tube Description Positional 12/28/21 1000   Catheter Care Done;Catheter wipes 12/28/21 1000   Collection Container Standard;Patent 12/28/21 1000   Securement Method Securing device (Describe) 12/28/21 1000   Rationale for Continued Use Strict 1-2 Hour I&O 12/28/21 1000   Urine Output 150 mL 12/28/21 1000   Number of days: 5       Wound Perineum Moisture damage;Ulceration (Active)   Wound Bed Moist;[place] 12/28/21 0800   Breann-wound Assessment Erythema;Edema 12/28/21 0800   Drainage Amount Small 12/28/21 0400   Drainage Color/Characteristics Serosanguineous 12/28/21 0400   Wound Care/Cleansing Barrier applied ;Soap and water 12/28/21 0400   Dressing Open to air 12/28/21 0800   Number of days: 4       Wound Pelvis Abrasion(s) (Active)   Wound Bed Red;Edema 12/28/21 0800   Breann-wound Assessment Edema;Erythema 12/28/21 0800   Drainage Amount Scant 12/27/21 2000   Drainage Color/Characteristics Sanguinous 12/27/21 2000   Wound Care/Cleansing Wound  cleanser 12/27/21 2000   Dressing Open to air 12/28/21 0800   Number of days: 4       Wound (used by OP [facility] only) 07/09/19 1527 Left coccyx pressure injury (Active)   Number of days: 903          ICU Prophylaxis:   1. DVT: Hep Subq/mechanical  2. VAP: HOB 30 degrees, chlorhexidine rinse  3. Stress Ulcer: PPI  4. Restraints: Nonviolent soft two point restraints required and necessary for patient safety and continued cares and good effect as patient continues to pull at necessary lines, tubes despite education and distraction. Will readdress daily.   5. IV Access - central access required and necessary for continued patient cares  6. Feeding - enteral      Medications:       amiodarone  400 mg Oral BID     [START ON 1/3/2022] amiodarone  400 mg Oral Daily     chlorhexidine  15 mL Mouth/Throat Q12H     DULoxetine  60 mg Oral or Feeding Tube BID     heparin ANTICOAGULANT  5,000 Units Subcutaneous Q8H     insulin aspart  1-6 Units Subcutaneous Q4H     multivitamins w/minerals  15 mL Per Feeding Tube Daily     oxyCODONE  10 mg Oral Q4H     pantoprazole  40 mg Per Feeding Tube QAM AC    Or     pantoprazole (PROTONIX) IV  40 mg Intravenous QAM AC     piperacillin-tazobactam  4.5 g Intravenous Q6H     sodium chloride (PF)  3 mL Intracatheter Q8H         Review of Systems:   Unable to obtain due to critical illness            Physical Exam:   Temp:  [98.1  F (36.7  C)-101.7  F (38.7  C)] 98.1  F (36.7  C)  Pulse:  [54-87] 56  Resp:  [17-21] 20  BP: ()/(47-94) 102/59  MAP:  [61 mmHg-216 mmHg] 70 mmHg  Arterial Line BP: ()/() 77/64  FiO2 (%):  [80 %-100 %] 100 %  SpO2:  [72 %-100 %] 93 %    Intake/Output Summary (Last 24 hours) at 12/28/2021 1128  Last data filed at 12/28/2021 1000  Gross per 24 hour   Intake 2460.62 ml   Output 1710 ml   Net 750.62 ml     Wt Readings from Last 4 Encounters:   12/26/21 143.8 kg (317 lb)   01/29/20 114.3 kg (252 lb)   07/29/19 114.3 kg (252 lb)   07/17/19 114.3 kg (252 lb)      Arterial Line BP: ()/() 77/64  MAP:  [61 mmHg-216 mmHg] 70 mmHg  BP - Mean:  [63-98] 75  Ventilation Mode: CMV/AC  (Continuous Mandatory Ventilation/ Assist Control)  FiO2 (%): 100 %  Rate Set (breaths/minute): 20 breaths/min  Tidal Volume Set (mL): 500 mL  PEEP (cm H2O): 10 cmH2O  Oxygen Concentration (%): 100 %  Resp: 20    Recent Labs   Lab 12/28/21  1104 12/27/21  1413 12/27/21  0600 12/26/21  0401   PH 7.37 7.40 7.51* 7.51*   PCO2 57* 21* 41 41   PO2 105 141* 57* 68*   HCO3 33* 13* 33* 33*   O2PER 80 100 70 60       GEN: no acute distress   HEENT: head ncat, sclera anicteric, OP patent, trachea midline   PULM: unlabored synchronous with vent, clear anteriorly    CV/COR: RRR S1S2 no gallop,  No rub, no murmur  ABD: soft nontender, hypoactive bowel sounds, no mass  EXT:  Edema   warm  NEURO: grossly intact  SKIN: no obvious rash      Assessment and plan :     Neurology/Psychiatry/Pain/Sedation:   1. Sedation for vent synchrony. RASS -4 on precedex@1 and fentanyl@75. On oral [id number].  2. MS. Has baclofen pump. Check baclofen level today  3. Chronic pain. PTA duloxetine and oral oxycodone@10q4     Cardiovascular/Hemodynamics/Pulm/Vent/GI/Renal:   1. Acute respiratory failure, JULIANNE, Afib, septic shock, leukocytosis 2/2 UTI. ON 70% FiO2 and PEEP 5. Plan to wean FiO2 today. Afib rate controlled on PO amiodarone. Tal has been off. Leukocytosis is improving on zosyn and cont for 14d course. JULIANNE improving, remains non-oliguric.    2. ICU glucose protocol and lantus   3. Enteral nutrition      Disposition/Code Status/Other  1. Critically ill  2. Code: DNR      I have personally reviewed the daily labs, imaging studies, cultures and discussed the case with referring physician and consulting physicians.     This patient is critically ill and I have provided 30 minutes of critical care time (excluding procedures) on December 28, 2021.     Jerrod Esteban MD, A   of Medicine  Interventional  Pulmonary  Department of Pulmonary, Allergy, Critical Care and Sleep Medicine   [facility]  Pager: [phone]   Office: [phone]  Email: [email or web address]    ROUTINE ICU LABS (Last four results)  CMP  Recent Labs   Lab 12/28/21  0817 12/28/21  0410 12/28/21  0338 12/28/21  0112 12/27/21  0524 12/27/21  0518 12/26/21  0823 12/26/21  0401 12/25/21  0823 12/25/21  0413 12/24/21  2040 12/24/21  1705 12/23/21  2140 12/23/21  2121   NA  --  148*  --   --   --  146*  --  145*  --  139  --  137   < > 135   POTASSIUM  --  3.9  --   --   --  3.6  --  3.8  --  4.2  --  4.3   < > 5.4*   CHLORIDE  --  114*  --   --   --  112*  --  109  --  101  --  99   < > 95   CO2  --  32  --   --   --  31  --  31  --  28  --  30   < > 27   ANIONGAP  --  2*  --   --   --  3  --  5  --  10  --  8   < > 13   * 146* 128* 133*   < > 124*   < > 142*   < > 113*   < > 91   < > 107*   BUN  --  47*  --   --   --  54*  --  60*  --  75*  --  77*   < > 76*   CR  --  1.07*  --   --   --  1.19*  --  1.36*  --  2.15*  --  2.69*   < > 3.50*   GFRESTIMATED  --  60*  --   --   --  53*  --  45*  --  26*  --  20*   < > 14*   MARY  --  8.0*  --   --   --  8.0*  --  8.9  --  7.1*  --  6.7*   < > 7.6*   MAG  --   --   --   --   --  2.2  --   --   --  2.5*  --  1.9  --  2.4*   PHOS  --   --   --   --   --  3.9  --   --   --  4.5  --   --   --  12.2*   PROTTOTAL  --  7.0  --   --   --  6.6*  --  6.3*  --  6.7*  --  6.8   < > 8.3   ALBUMIN  --  1.6*  --   --   --  1.7*  --  1.6*  --  1.6*  --  1.7*   < > 2.4*   BILITOTAL  --  2.1*  --   --   --  2.1*  --  2.1*  --  1.5*  --  1.4*   < > 1.5*   ALKPHOS  --  166*  --   --   --  163*  --  174*  --  218*  --  220*   < > 246*   AST  --  110*  --   --   --  188*  --  427*  --  1,315*  --  2,283*   < > 3,722*   ALT  --  970*  --   --   --  1,356*  --  1,962*  --  3,186*  --  3,596*   < > >1,000*    < > = values in this interval not displayed.     CBC  Recent Labs   Lab 12/28/21  0166  12/27/21  0518 12/26/21  0401 12/25/21  0413   WBC 14.9* 17.8* 20.8* 26.4*   RBC 5.60* 5.29* 5.35* 5.83*   HGB 12.0 11.4* 11.4* 12.2   HCT 44.4 39.8 39.5 42.8   MCV 79 75* 74* 73*   MCH 21.4* 21.6* 21.3* 20.9*   MCHC 27.0* 28.6* 28.9* 28.5*   RDW 21.1* 20.5* 20.0* 20.3*    241 317 383     INR  Recent Labs   Lab 12/23/21  2121   INR 1.93*     Arterial Blood Gas  Recent Labs   Lab 12/28/21  1104 12/27/21  1413 12/27/21  0600 12/26/21  0401   PH 7.37 7.40 7.51* 7.51*   PCO2 57* 21* 41 41   PO2 105 141* 57* 68*   HCO3 33* 13* 33* 33*   O2PER 80 100 70 60       All cultures:  No results for input(s): CULT in the last 168 hours.  Recent Results (from the past 24 hour(s))   XR Chest Port 1 View    Narrative    CHEST ONE VIEW  12/27/2021 12:46 PM     HISTORY: Increase in vent settings    COMPARISON: December 24, 2021      Impression    IMPRESSION: Endotracheal tube tip approximately 6.5 cm from the  ron. Right IJ line stable. Probable increase in left lower lobe  atelectasis and/or infiltrate. Increased groundglass infiltrates at  the right base compared to previous.    DERECK MIRANDA MD         SYSTEM ID:  [id number]   XR Abdomen Port 1 View    Narrative    EXAM: XR ABDOMEN PORT 1 VIEWS  LOCATION: [facility]  DATE/TIME: 12/28/2021 3:41 AM    INDICATION: NJ placement  COMPARISON: 12/23/2021      Impression    IMPRESSION: Feeding tube has been placed, looped in the body of the stomach with the tip oriented superiorly projecting over the gastric fundus. Partially imaged spinal fusion hardware.    XR Abdomen Port 1 View    Narrative    EXAM: XR ABDOMEN PORT 1 VIEWS  LOCATION: [facility]  DATE/TIME: 12/28/2021 4:15 AM    INDICATION: NJ placement  COMPARISON: 12/28/2021      Impression    IMPRESSION: Interval advancement of feeding tube with the distal tip of the tube angulated or potentially kinked over the lumbar spine in the region of the pyloric channel. Visualized  bowel gas pattern is nonobstructive.

## 2021-12-29 NOTE — PROGRESS NOTES
Lab notified RN at 1559 for a critical lab result, baclofen level is less than 20ng/ml, Dr. Harris notified at 1600 of result. No new orders at this time.

## 2021-12-29 NOTE — PLAN OF CARE
Patient remains intubated and sedated throughout shift. Vent settings weaned as able. Fentanyl and precedex infusing for sedation, patient tolerating. Patient remains vitally stable throughout shift.  Sinus bradycardia/normal sinus rhythm. Patient has 3+ generalized edema, diuresing, 1600ml of urine output this shift. Tube feeds continue to infuse. Bobo replaced this shift due to leaking. Adequate urine output. No bowel movement this shift. Turned and repositioned q2h. Wound care RN came by to assess patient skin, new orders placed, patient  at bedside during wound consult and in agreement to implement new changes at home. Will continue to wean vent and sedation as able. Will continue to monitor and assess patient.

## 2021-12-29 NOTE — PLAN OF CARE
Patient is sedated on Dex and fentanyl,follows commands,responds spontaneously and nods her head to yes/no questions. Patient demonstrates understanding of disease process and treatment plan. Patient's pain controled on Fentanyl ggt and scheduled oxy., repositioned  Q2Hr, skin care per care plan, Full vent support, titrated FiO2 down.Jihan is patent, on Lasix.

## 2021-12-29 NOTE — PROGRESS NOTES
[facility]   CRITICAL CARE NOTE     Amanda Richadrson MRN: [id number]  1965  Date of Admission:12/23/2021          Problem List:   1. Acute respiratory failure   2. Severe sepsis   3. Afib   4. MS       24-Hour Goals   1. Wean IV sedatives   2. Wean FiO2 as able  3. Diuresis       Overnight Events   No other acute issues overnight. O2 requirements have improved.          Lines/Tubes/Draines/Devices   .  Peripheral IV 12/23/21 Anterior;Right Upper forearm (Active)   Site Assessment Ecchymotic 12/29/21 0400   Line Status Saline locked 12/29/21 0400   Phlebitis Scale 0-->no symptoms 12/29/21 0400   Infiltration Scale 0 12/29/21 0400   Number of days: 6       Peripheral IV 12/23/21 Anterior;Distal;Left Upper arm (Active)   Site Assessment WDL 12/29/21 0400   Line Status Saline locked 12/29/21 0400   Phlebitis Scale 0-->no symptoms 12/29/21 0400   Infiltration Scale 0 12/29/21 0400   Number of days: 6       CVC TRIPLE LUMEN Right Internal jugular (Active)   Site Assessment WDL 12/29/21 0400   Dressing Type Chlorhexidine sponge;Transparent 12/29/21 0400   Dressing Status other (comment) 12/26/21 2100   Dressing Intervention new dressing;dressing changed 12/26/21 2100   Dressing Change Due 12/02/21 12/26/21 2100   Line Necessity yes, meets criteria 12/25/21 0800   Blue - Status infusing 12/29/21 0400   Brown - Status saline locked;blood return noted 12/29/21 0400   White - Status infusing 12/29/21 0400   Infiltration? no 12/29/21 0400   Infiltration Scale 0 12/26/21 0400   Number of days: 6       ETT Oral 7.5 mm (Active)   Secured at (cm) 26 cm 12/29/21 0728   Measured from Lips 12/29/21 0728   Position Left 12/29/21 0600   Secured by Commercial tube kim 12/29/21 0728   Bite Block None Present 12/29/21 0728   Site Appearance Clean;Dry 12/29/21 0400   Tube Care Site care done 12/29/21 0400   Cuff Assessment Minimal occluding volume 12/28/21 1600   Safety Measures Manual resuscitator at bedside  12/29/21 0728   Number of days: 6       NG/OG/NJ Tube Nasoduodenal 10 fr Right nostril (Active)   Site Description WDL 12/29/21 0400   Status Enteral Feedings 12/29/21 0400   Placement Confirmation [place] unchanged 12/29/21 0400   [place] (cm marking) at nare/mouth 94 cm 12/29/21 0000   Intake (ml) 30 ml 12/29/21 0400   Flush/Free Water (mL) 60 mL 12/29/21 0400   Residual (mL) 0 mL 12/28/21 0800   Number of days: 1       Urethral Catheter 16 fr (Active)   Tube Description Positional 12/28/21 1800   Catheter Care Done;Catheter wipes;Soap and water/perineal cleanser 12/28/21 2200   Collection Container Standard;Patent 12/29/21 0600   Securement Method Securing device (Describe) 12/29/21 0600   Rationale for Continued Use Strict 1-2 Hour I&O 12/29/21 0600   Urine Output 350 mL 12/29/21 0600   Number of days: 1       Wound Perineum Moisture damage;Ulceration (Active)   Wound Bed Moist;[place];Red 12/29/21 0400   Breann-wound Assessment Edema;Erythema 12/29/21 0400   Drainage Amount Small 12/29/21 0400   Drainage Color/Characteristics Serosanguineous 12/29/21 0400   Wound Care/Cleansing Barrier applied  12/29/21 0400   Dressing Open to air 12/29/21 0400   Number of days: 5       Wound Pelvis Abrasion(s) (Active)   Wound Bed Red;Edema 12/29/21 0400   Breann-wound Assessment Edema;Erythema 12/29/21 0400   Drainage Amount Scant 12/29/21 0400   Drainage Color/Characteristics Serosanguineous 12/29/21 0400   Wound Care/Cleansing Wound cleanser 12/29/21 0400   Dressing Open to air 12/29/21 0400   Number of days: 5       Wound (used by OP [facility] only) 07/09/19 1527 Left coccyx pressure injury (Active)   Number of days: 904          ICU Prophylaxis:   1. DVT: Hep Subq/mechanical  2. VAP: HOB 30 degrees, chlorhexidine rinse  3. Stress Ulcer: PPI  4. Restraints: Nonviolent soft two point restraints required and necessary for patient safety and continued cares and good effect as patient continues to pull at necessary lines, tubes despite  education and distraction. Will readdress daily.   5. IV Access - central access required and necessary for continued patient cares  6. Feeding - enteral      Medications:       acetaZOLAMIDE  250 mg Oral or Feeding Tube TID     amiodarone  400 mg Oral BID     [START ON 1/3/2022] amiodarone  400 mg Oral Daily     chlorhexidine  15 mL Mouth/Throat Q12H     DULoxetine  60 mg Oral or Feeding Tube BID     furosemide  20 mg Intravenous Q8H     heparin ANTICOAGULANT  5,000 Units Subcutaneous Q8H     insulin aspart  1-6 Units Subcutaneous Q4H     multivitamins w/minerals  15 mL Per Feeding Tube Daily     oxyCODONE  10 mg Oral Q4H     pantoprazole  40 mg Per Feeding Tube QAM AC    Or     pantoprazole (PROTONIX) IV  40 mg Intravenous QAM AC     piperacillin-tazobactam  4.5 g Intravenous Q6H     sodium chloride (PF)  3 mL Intracatheter Q8H     baclofen (LIORESAL) intraTHECAL Internal Pump, dextrose, dextrose, glucose **OR** dextrose **OR** glucagon, fentaNYL, fentaNYL, lidocaine 4%, lidocaine (buffered or not buffered), sodium chloride (PF)      Review of Systems:   Unable to obtain due to critical illness          Physical Exam:   Temp:  [97.4  F (36.3  C)-98.6  F (37  C)] 97.5  F (36.4  C)  Pulse:  [52-87] 60  Resp:  [13-25] 20  BP: ()/(39-91) 117/54  FiO2 (%):  [50 %-100 %] 50 %  SpO2:  [83 %-100 %] 96 %    Intake/Output Summary (Last 24 hours) at 12/29/2021 0756  Last data filed at 12/29/2021 0602  Gross per 24 hour   Intake 2698.05 ml   Output 4100 ml   Net -1401.95 ml     Wt Readings from Last 4 Encounters:   12/29/21 129.1 kg (284 lb 9.6 oz)   01/29/20 114.3 kg (252 lb)   07/29/19 114.3 kg (252 lb)   07/17/19 114.3 kg (252 lb)     BP - Mean:  [] 77  Ventilation Mode: CMV/AC  (Continuous Mandatory Ventilation/ Assist Control)  FiO2 (%): 50 %  Rate Set (breaths/minute): 20 breaths/min  Tidal Volume Set (mL): 500 mL  PEEP (cm H2O): 8 cmH2O  Oxygen Concentration (%): 50 %  Resp: 20    Recent Labs   Lab  12/28/21  1104 12/27/21  1413 12/27/21  0600 12/26/21  0401   PH 7.37 7.40 7.51* 7.51*   PCO2 57* 21* 41 41   PO2 105 141* 57* 68*   HCO3 33* 13* 33* 33*   O2PER 80 100 70 60       GEN: no acute distress   HEENT: head ncat, sclera anicteric, OP patent, trachea midline   PULM: unlabored synchronous with vent, clear anteriorly    CV/COR: RRR S1S2 no gallop,  No rub, no murmur  ABD: soft nontender, hypoactive bowel sounds, no mass  EXT:  Edema   warm  NEURO: grossly intact  SKIN: no obvious rash      Assessment and plan :     Neurology/Psychiatry/Pain/Sedation:   1. Sedation for vent synchrony. RASS -4 on precedex@1 and fentanyl@75. On oral [id number]. Wean IV's today  2. MS. Has baclofen pump. Check baclofen level today  3. Chronic pain. PTA duloxetine and oral oxycodone@10q4     Cardiovascular/Hemodynamics/Pulm/Vent/GI/Renal:   1. Acute respiratory failure, JULIANNE, Afib, septic shock, leukocytosis 2/2 UTI. On 50% FiO2 and PEEP 8. Plan SBT today. Afib rate controlled on PO amiodarone. Tal has been off. Leukocytosis is improving on zosyn and cont for 14d course. JULIANNE improving, remains non-oliguric.    2. ICU glucose protocol and lantus   3. Enteral nutrition      Disposition/Code Status/Other  1. Critically ill  2. Code: DNR     I have personally reviewed the daily labs, imaging studies, cultures and discussed the case with referring physician and consulting physicians.     This patient is critically ill and I have provided 30 minutes of critical care time (excluding procedures) on December 29, 2021.     Jerrod Esteban MD, MHA   of Medicine  Interventional Pulmonary  Department of Pulmonary, Allergy, Critical Care and Sleep Medicine   [facility]  Pager: [phone]   Office: [phone]  Email: [email or web address]    ROUTINE ICU LABS (Last four results)  CMP  Recent Labs   Lab 12/29/21  0338 12/29/21  0337 12/29/21  0007 12/28/21  2038 12/28/21  0817 12/28/21  0410 12/27/21  0524  12/27/21  0518 12/26/21  0823 12/26/21  0401 12/25/21  0823 12/25/21  0413 12/24/21  2040 12/24/21  1705 12/23/21  2140 12/23/21  2121   *  --   --   --   --  148*  --  146*  --  145*  --  139  --  137   < > 135   POTASSIUM 3.7  --   --   --   --  3.9  --  3.6  --  3.8  --  4.2  --  4.3   < > 5.4*   CHLORIDE 117*  --   --   --   --  114*  --  112*  --  109  --  101  --  99   < > 95   CO2 33*  --   --   --   --  32  --  31  --  31  --  28  --  30   < > 27   ANIONGAP 3  --   --   --   --  2*  --  3  --  5  --  10  --  8   < > 13   * 104* 119* 136*   < > 146*   < > 124*   < > 142*   < > 113*   < > 91   < > 107*   BUN 44*  --   --   --   --  47*  --  54*  --  60*  --  75*  --  77*   < > 76*   CR 1.03  --   --   --   --  1.07*  --  1.19*  --  1.36*  --  2.15*  --  2.69*   < > 3.50*   GFRESTIMATED 63  --   --   --   --  60*  --  53*  --  45*  --  26*  --  20*   < > 14*   MARY 7.4*  --   --   --   --  8.0*  --  8.0*  --  8.9  --  7.1*  --  6.7*   < > 7.6*   MAG  --   --   --   --   --   --   --  2.2  --   --   --  2.5*  --  1.9  --  2.4*   PHOS  --   --   --   --   --   --   --  3.9  --   --   --  4.5  --   --   --  12.2*   PROTTOTAL 6.5*  --   --   --   --  7.0  --  6.6*  --  6.3*  --  6.7*  --  6.8   < > 8.3   ALBUMIN 1.7*  --   --   --   --  1.6*  --  1.7*  --  1.6*  --  1.6*  --  1.7*   < > 2.4*   BILITOTAL 1.3  --   --   --   --  2.1*  --  2.1*  --  2.1*  --  1.5*  --  1.4*   < > 1.5*   ALKPHOS 137  --   --   --   --  166*  --  163*  --  174*  --  218*  --  220*   < > 246*   AST 60*  --   --   --   --  110*  --  188*  --  427*  --  1,315*  --  2,283*   < > 3,722*   *  --   --   --   --  970*  --  1,356*  --  1,962*  --  3,186*  --  3,596*   < > >1,000*    < > = values in this interval not displayed.     CBC  Recent Labs   Lab 12/29/21  0338 12/28/21  0410 12/27/21  0518 12/26/21  0401   WBC 13.2* 14.9* 17.8* 20.8*   RBC 5.27* 5.60* 5.29* 5.35*   HGB 11.3* 12.0 11.4* 11.4*   HCT 42.6 44.4 39.8 39.5    MCV 81 79 75* 74*   MCH 21.4* 21.4* 21.6* 21.3*   MCHC 26.5* 27.0* 28.6* 28.9*   RDW 21.0* 21.1* 20.5* 20.0*    238 241 317     INR  Recent Labs   Lab 12/23/21  2121   INR 1.93*     Arterial Blood Gas  Recent Labs   Lab 12/28/21  1104 12/27/21  1413 12/27/21  0600 12/26/21  0401   PH 7.37 7.40 7.51* 7.51*   PCO2 57* 21* 41 41   PO2 105 141* 57* 68*   HCO3 33* 13* 33* 33*   O2PER 80 100 70 60       All cultures:  No results for input(s): CULT in the last 168 hours.  No results found for this or any previous visit (from the past 24 hour(s)).

## 2021-12-29 NOTE — PROGRESS NOTES
"Brief Intensivist Note  Patient examined briefly with concern of UE rash and edema  On exam she has anasarca of arms and mild erythema of skin; this does not appear pathologic. Her lower extremities still have significant edema with severe chronic stasis changes on right leg but \"pruned\" appearance likely from diuresis. There was nothing in her extremities that appear pathologic to me at this time, and no changes made at this time.     Her oxygen saturations are 94% on 40% FIO2 and +10 PEEP; no vent changes made at this time.     rufino strauss  December 29, 2021  "

## 2021-12-30 NOTE — PROGRESS NOTES
[facility]   CRITICAL CARE NOTE     Amanda Richardson MRN: [id number]  1965  Date of Admission:12/23/2021          Problem List:   1. Acute respiratory failure   2. Severe sepsis   3. Afib   4. MS       24-Hour Goals   1. SAT+SBT  2. Diuresise       Overnight Events   No other acute issues overnight.       Lines/Tubes/Draines/Devices   .  Peripheral IV 12/23/21 Anterior;Right Upper forearm (Active)   Site Assessment WDL except 12/30/21 0800   Line Status Saline locked 12/30/21 0800   Phlebitis Scale 0-->no symptoms 12/30/21 0400   Infiltration Scale 1 12/30/21 0800   Number of days: 7       Peripheral IV 12/23/21 Anterior;Distal;Left Upper arm (Active)   Site Assessment WDL 12/30/21 0800   Line Status Saline locked 12/30/21 0800   Phlebitis Scale 0-->no symptoms 12/30/21 0800   Infiltration Scale 0 12/30/21 0800   Number of days: 7       CVC TRIPLE LUMEN Right Internal jugular (Active)   Site Assessment WDL 12/30/21 0800   Dressing Type Chlorhexidine sponge;Transparent 12/30/21 0400   Dressing Status other (comment) 12/26/21 2100   Dressing Intervention new dressing;dressing changed 12/26/21 2100   Dressing Change Due 12/02/21 12/26/21 2100   Line Necessity yes, meets criteria 12/30/21 0800   Blue - Status infusing 12/30/21 0800   Brown - Status saline locked 12/30/21 0800   White - Status infusing 12/30/21 0800   Infiltration? no 12/30/21 0800   Infiltration Scale 0 12/26/21 0400   Number of days: 7       ETT Oral 7.5 mm (Active)   Secured at (cm) 26 cm 12/30/21 0703   Measured from Lips 12/30/21 0703   Position Left 12/30/21 0800   Secured by Commercial tube kim 12/30/21 0703   Bite Block None Present 12/30/21 0703   Site Appearance Clean;Dry 12/30/21 0703   Tube Care Site care done 12/29/21 0400   Cuff Assessment Minimal occluding volume 12/30/21 0703   Cuff Pressure - cm H2O 30 cmH20 12/29/21 2300   Safety Measures Manual resuscitator at bedside 12/30/21 0703   Number of days: 7        NG/OG/NJ Tube Nasoduodenal 10 fr Right nostril (Active)   Site Description WDL 12/30/21 0800   Status Enteral Feedings 12/30/21 0800   Placement Confirmation [place] unchanged 12/30/21 0400   [place] (cm marking) at nare/mouth 94 cm 12/30/21 0800   Intake (ml) 90 ml 12/30/21 0800   Flush/Free Water (mL) 60 mL 12/30/21 0800   Residual (mL) 0 mL 12/28/21 0800   Number of days: 2       Urethral Catheter 16 fr (Active)   Tube Description Positional 12/29/21 1600   Catheter Care Catheter wipes;Done 12/30/21 1100   Collection Container Standard 12/30/21 1100   Securement Method Securing device (Describe) 12/30/21 1100   Rationale for Continued Use Strict 1-2 Hour I&O 12/30/21 1100   Urine Output 200 mL 12/30/21 1100   Number of days: 2       Wound Perineum Moisture damage;Ulceration (Active)   Wound Bed Moist;Pink;[place];Red 12/30/21 0800   Breann-wound Assessment Erythema;Edema 12/30/21 0800   Drainage Amount Scant 12/30/21 0800   Drainage Color/Characteristics Serosanguineous 12/30/21 0800   Wound Care/Cleansing Barrier applied  12/30/21 0800   Dressing Other (Comment) 12/30/21 0000   Dressing Status New dressing 12/30/21 0800   Number of days: 6       Wound Pelvis Abrasion(s) (Active)   Wound Bed Moist;[place] 12/30/21 0800   Breann-wound Assessment Erythema;Edema 12/30/21 0800   Drainage Amount Small 12/30/21 0800   Drainage Color/Characteristics Serosanguineous 12/30/21 0800   Wound Care/Cleansing Barrier applied ;Soap and water 12/30/21 0000   Dressing Other (Comment) 12/30/21 0000   Dressing Status New dressing 12/30/21 0000   Number of days: 6       Wound Breast (Active)   Wound Bed Moist;[place];Red 12/30/21 0800   Breann-wound Assessment Erythema;Edema 12/30/21 0800   Drainage Amount Scant 12/30/21 0800   Drainage Color/Characteristics [place] 12/30/21 0800   Dressing Status New dressing 12/30/21 0000   Dressing Change Due 12/30/21 12/30/21 0230   Number of days: 0       Wound Breast (Active)   Wound Bed Moist;[place] 12/30/21  0800   Breann-wound Assessment Erythema;Edema 12/30/21 0800   Drainage Amount None 12/30/21 0800   Drainage Color/Characteristics Yellow 12/30/21 0000   Dressing Status New dressing 12/30/21 0000   Dressing Change Due 12/30/21 12/30/21 0230   Number of days: 0       Wound Thigh (Active)   Wound Bed Moist 12/30/21 0800   Breann-wound Assessment Edema;Excoriated 12/30/21 0800   Drainage Amount Scant 12/30/21 0800   Drainage Color/Characteristics Sanguinous 12/30/21 0800   Dressing Status New dressing 12/30/21 0000   Dressing Change Due 12/30/21 12/30/21 0230   Number of days: 0       Wound (used by OP [facility] only) 07/09/19 1527 Left coccyx pressure injury (Active)   Number of days: 905          ICU Prophylaxis:   1. DVT: Hep Subq/mechanical  2. VAP: HOB 30 degrees, chlorhexidine rinse  3. Stress Ulcer: PPI  4. Restraints: Nonviolent soft two point restraints required and necessary for patient safety and continued cares and good effect as patient continues to pull at necessary lines, tubes despite education and distraction. Will readdress daily.   5. IV Access - central access required and necessary for continued patient cares  6. Feeding - enteral       Medications:       acetaZOLAMIDE  250 mg Oral or Feeding Tube TID     amiodarone  400 mg Oral or Feeding Tube BID     [START ON 1/3/2022] amiodarone  400 mg Oral or Feeding Tube Daily     chlorhexidine  15 mL Mouth/Throat Q12H     docusate  100 mg Oral or Feeding Tube BID     DULoxetine  60 mg Oral or Feeding Tube BID     furosemide  20 mg Intravenous Q8H     heparin ANTICOAGULANT  5,000 Units Subcutaneous Q8H     insulin aspart  1-6 Units Subcutaneous Q4H     multivitamins w/minerals  15 mL Per Feeding Tube Daily     oxyCODONE  10 mg Oral or Feeding Tube Q4H     pantoprazole  40 mg Per Feeding Tube QAM AC    Or     pantoprazole (PROTONIX) IV  40 mg Intravenous QAM AC     piperacillin-tazobactam  4.5 g Intravenous Q6H     polyethylene glycol  17 g Oral or Feeding Tube Daily      sennosides  5 mL Oral or Feeding Tube BID     sodium chloride (PF)  3 mL Intracatheter Q8H         Review of Systems:   Unable to obtain due to critical illness          Physical Exam:   Temp:  [98.2  F (36.8  C)-98.8  F (37.1  C)] 98.8  F (37.1  C)  Pulse:  [58-86] 72  Resp:  [11-29] 11  BP: ()/(38-66) 135/63  FiO2 (%):  [40 %] 40 %  SpO2:  [92 %-96 %] 92 %    Intake/Output Summary (Last 24 hours) at 12/30/2021 1155  Last data filed at 12/30/2021 1100  Gross per 24 hour   Intake 3022.64 ml   Output 3640 ml   Net -617.36 ml     Wt Readings from Last 4 Encounters:   12/30/21 126.1 kg (277 lb 15.7 oz)   01/29/20 114.3 kg (252 lb)   07/29/19 114.3 kg (252 lb)   07/17/19 114.3 kg (252 lb)     BP - Mean:  [55-89] 89  Ventilation Mode: CMV/AC  (Continuous Mandatory Ventilation/ Assist Control)  FiO2 (%): 40 %  Rate Set (breaths/minute): 20 breaths/min  Tidal Volume Set (mL): 500 mL  PEEP (cm H2O): 10 cmH2O  Pressure Support (cm H2O): 8 cmH2O  Oxygen Concentration (%): 40 %  Resp: 11    Recent Labs   Lab 12/28/21  1104 12/27/21  1413 12/27/21  0600 12/26/21  0401   PH 7.37 7.40 7.51* 7.51*   PCO2 57* 21* 41 41   PO2 105 141* 57* 68*   HCO3 33* 13* 33* 33*   O2PER 80 100 70 60       GEN: no acute distress   HEENT: head ncat, sclera anicteric, OP patent, trachea midline   PULM: unlabored synchronous with vent, clear anteriorly    CV/COR: RRR S1S2 no gallop,  No rub, no murmur  ABD: soft nontender, hypoactive bowel sounds, no mass  EXT:  Edema   warm  NEURO: grossly intact  SKIN: no obvious rash      Assessment and plan :     Neurology/Psychiatry/Pain/Sedation:   1. Sedation for vent synchrony. RASS -4 on precedex@1 and fentanyl@50. On oral [id number]. SAT  2. MS. Has baclofen pump. Will discuss with neuro regarding interrogation    3. Chronic pain. PTA duloxetine and oral oxycodone@10q4     Cardiovascular/Hemodynamics/Pulm/Vent/GI/Renal:   1. Acute respiratory failure, JULIANNE, Afib, septic shock, leukocytosis 2/2 UTI. On  50% FiO2 and PEEP 8. Plan SBT today. Afib rate controlled on PO amiodarone. Tal has been off. Leukocytosis is improving on zosyn and cont for 14d course. JULIANNE improving, remains non-oliguric.    2. ICU glucose protocol and lantus   3. Enteral nutrition      Disposition/Code Status/Other  1. Critically ill  2. Code: DNR   I have personally reviewed the daily labs, imaging studies, cultures and discussed the case with referring physician and consulting physicians.     This patient is critically ill and I have provided 30 minutes of critical care time (excluding procedures) on December 30, 2021.     Jerrod Esteban MD, MHA   of Medicine  Interventional Pulmonary  Department of Pulmonary, Allergy, Critical Care and Sleep Medicine   [facility]  Pager: [phone]   Office: [phone]  Email: [email or web address]    ROUTINE ICU LABS (Last four results)  CMP  Recent Labs   Lab 12/30/21  0821 12/30/21  0557 12/30/21  0402 12/30/21  0100 12/29/21  0842 12/29/21  0338 12/28/21  0817 12/28/21  0410 12/27/21  0524 12/27/21  0518 12/25/21  0823 12/25/21  0413 12/24/21  2040 12/24/21  1705 12/23/21  2140 12/23/21  2121   NA  --  152*  --   --   --  153*  --  148*  --  146*   < > 139  --  137   < > 135   POTASSIUM  --  3.5  --   --   --  3.7  --  3.9  --  3.6   < > 4.2  --  4.3   < > 5.4*   CHLORIDE  --  117*  --   --   --  117*  --  114*  --  112*   < > 101  --  99   < > 95   CO2  --  34*  --   --   --  33*  --  32  --  31   < > 28  --  30   < > 27   ANIONGAP  --  1*  --   --   --  3  --  2*  --  3   < > 10  --  8   < > 13   * 147* 125* 128*   < > 143*   < > 146*   < > 124*   < > 113*   < > 91   < > 107*   BUN  --  35*  --   --   --  44*  --  47*  --  54*   < > 75*  --  77*   < > 76*   CR  --  1.02  --   --   --  1.03  --  1.07*  --  1.19*   < > 2.15*  --  2.69*   < > 3.50*   GFRESTIMATED  --  63  --   --   --  63  --  60*  --  53*   < > 26*  --  20*   < > 14*   MARY  --  7.8*  --   --    --  7.4*  --  8.0*  --  8.0*   < > 7.1*  --  6.7*   < > 7.6*   MAG  --   --   --   --   --   --   --   --   --  2.2  --  2.5*  --  1.9  --  2.4*   PHOS  --   --   --   --   --   --   --   --   --  3.9  --  4.5  --   --   --  12.2*   PROTTOTAL  --  7.0  --   --   --  6.5*  --  7.0  --  6.6*   < > 6.7*  --  6.8   < > 8.3   ALBUMIN  --  1.8*  --   --   --  1.7*  --  1.6*  --  1.7*   < > 1.6*  --  1.7*   < > 2.4*   BILITOTAL  --  1.3  --   --   --  1.3  --  2.1*  --  2.1*   < > 1.5*  --  1.4*   < > 1.5*   ALKPHOS  --  147  --   --   --  137  --  166*  --  163*   < > 218*  --  220*   < > 246*   AST  --  47*  --   --   --  60*  --  110*  --  188*   < > 1,315*  --  2,283*   < > 3,722*   ALT  --  452*  --   --   --  625*  --  970*  --  1,356*   < > 3,186*  --  3,596*   < > >1,000*    < > = values in this interval not displayed.     CBC  Recent Labs   Lab 12/30/21  0557 12/29/21  0338 12/28/21  0410 12/27/21  0518   WBC 14.8* 13.2* 14.9* 17.8*   RBC 5.32* 5.27* 5.60* 5.29*   HGB 11.3* 11.3* 12.0 11.4*   HCT 43.3 42.6 44.4 39.8   MCV 81 81 79 75*   MCH 21.2* 21.4* 21.4* 21.6*   MCHC 26.1* 26.5* 27.0* 28.6*   RDW 21.6* 21.0* 21.1* 20.5*    187 238 241     INR  Recent Labs   Lab 12/23/21  2121   INR 1.93*     Arterial Blood Gas  Recent Labs   Lab 12/28/21  1104 12/27/21  1413 12/27/21  0600 12/26/21  0401   PH 7.37 7.40 7.51* 7.51*   PCO2 57* 21* 41 41   PO2 105 141* 57* 68*   HCO3 33* 13* 33* 33*   O2PER 80 100 70 60

## 2021-12-30 NOTE — PROGRESS NOTES
CLINICAL NUTRITION SERVICES - REASSESSMENT NOTE      Malnutrition: % Weight Loss:  None noted  % Intake:  </= 50% for >/= 5 days (severe malnutrition) (12/27)  Subcutaneous Fat Loss:  None observed  Muscle Loss:  None observed  Fluid Retention:  Mild - doubt nutritional (12/26)    Malnutrition Diagnosis: Patient does not meet two of the above criteria necessary for diagnosing malnutrition       EVALUATION OF PROGRESS TOWARD GOALS   Diet:  NPO, just extubated today     Nutrition Support:  Patient started on TF 12/26, advanced to goal on 12/28, and continues as follows ~    Nutrition Support Enteral:  Type of Feeding Tube: NJ  Enteral Frequency:  Continuous  Enteral Regimen: Vital HP at 65 mL/hr  Total Enteral Provisions: 1560 kcal (12 kcal/kg), 136 g protein (2.1 g/kg and 103% needs), 1304 mL H2O, 173 g CHO, no fiber   Free Water Flush: 120 mL every 4 hours       Intake/Tolerance:    Na 153 (H) - Flushes increased yesterday   K normal  BGM < 180 - Medium sliding scale insulin  I/O 3012/3540, wt 126.1 kg (down 17.7 kg from dosing weight but suspect dosing weight is inaccurate as all other weights since admit are within the same range) - Patient has been diuresing on Diamox and Lasix   BM x 1 on 12/25 and BM x 2 today - Holding Colace, Miralax, and Senokot       ASSESSED NUTRITION NEEDS:  Dosing Weight:  126.1 kg (energy - 12/30 weight), 66 kg (protein)  Estimated Energy Needs:  7875-6312 kcals (11-14 Kcal/Kg)  Justification: obese  Estimated Protein Needs: 112-132 grams protein (1.7-2 g pro/Kg)  Justification: wound healing, hypercatabolism with acute illness, obesity guidelines  and CKD      NEW FINDINGS:   No fat or muscle wasting noted on exam     12/28:  NJ tube placed   12/30:  Extubated     Pressor was discontinued 12/28    Previous Goals (12/26):   TF Vital HP at 65 mL/hr will meet % estimated needs  Evaluation: Met    Previous Nutrition Diagnosis (12/26):   Inadequate protein intake related to intubation  as evidenced by NPO x 4 days, meeting 0% needs, and TF planned  Evaluation: Resolved       MALNUTRITION  % Weight Loss:  None noted  % Intake:  </= 50% for >/= 5 days (severe malnutrition) (12/27)  Subcutaneous Fat Loss:  None observed  Muscle Loss:  None observed  Fluid Retention:  Mild - doubt nutritional (12/26)    Malnutrition Diagnosis: Patient does not meet two of the above criteria necessary for diagnosing malnutrition    CURRENT NUTRITION DIAGNOSIS  No nutrition diagnosis identified at this time     INTERVENTIONS  Recommendations / Nutrition Prescription  Continue goal TF regimen as above     Implementation  Collaboration and Referral of Nutrition care:  Patient discussed today during interdisciplinary bedside rounds     Goals  TF will continue to meet % needs while NPO     MONITORING AND EVALUATION:  Progress towards goals will be monitored and evaluated per protocol and Practice Guidelines    Belgica Morris RD, LD, CNSC   Clinical Dietitian - [facility]

## 2021-12-30 NOTE — PROGRESS NOTES
Weaned and extubated, oxymask at 6 L, weak congested cough after ice chips, needs bedside swollow per RN.  Large urine output, Liq. Stool q2h, hard to keep clean around the catheter.  Skin unchanged, new intradry replaced.  Confused to place, time and situation.  updated.

## 2021-12-30 NOTE — PLAN OF CARE
Patient underwent pressure support trial this morning. Sedation reduced at 0800 and stopped at 0950, see MAR. Patient switched to pressure support mode on ventilator at 0940, patient awake and following commands, Tidal volumes 350-400 during trial, O2 sats above 90%, vitally within normal limits, resp rate 12-16pbm. After approx 40 minutes patient became hypoxic with an O2 sat of 83%, tachycardic low 100s, low tidal volumes. Patient switched back to CMV mode with prior resumed settings, see flow sheet. Planning to trial pressure support tomorrow, will continue to monitor and assess patient.

## 2021-12-30 NOTE — PROGRESS NOTES
Extubation Note    Successful completion of SBT: Yes  Extubation time: 1159    Patient assessment:  Lung sounds: Clear and diminished  Stridor Present: No  Patient tolerance: Good    Oxygen device:  Liter flow: 6L Oxymask  SpO2: 97%    Loco Sanders, RT

## 2021-12-30 NOTE — PROGRESS NOTES
[facility] ICU RESPIRATORY NOTE        Date of Admission: 12/23/2021    Date of Intubation (most recent): 12/23/2021    Reason for Mechanical Ventilation: Respiratory Failure    Number of Days on Mechanical Ventilation: 7    Met Criteria for Spontaneous Breathing Trial: pt was placed on PS this morning and tolerated 40 minutes. Pt dropped sats and was placed back on full vent support      Significant Events Today: Pt placed on PS and tolerated 40 minutes    ABG Results:   Recent Labs   Lab 12/28/21  1104 12/27/21  1413 12/27/21  0600 12/26/21  0401   PH 7.37 7.40 7.51* 7.51*   PCO2 57* 21* 41 41   PO2 105 141* 57* 68*   HCO3 33* 13* 33* 33*   O2PER 80 100 70 60       Current Vent Settings: Ventilation Mode: CMV/AC  (Continuous Mandatory Ventilation/ Assist Control)  FiO2 (%): 40 %  Rate Set (breaths/minute): 20 breaths/min  Tidal Volume Set (mL): 500 mL  PEEP (cm H2O): 8 cmH2O  Pressure Support (cm H2O): 8 cmH2O  Oxygen Concentration (%): 50 %  Resp: 20      Skin Assessment: no issue    Plan: will keep on full vent support for overnight and PS pt as she tolerates.    Harsh Ma, RT

## 2021-12-30 NOTE — PROGRESS NOTES
Shift Summary: Pt stable, no overt changes. Noted some bleeding on Lt posterior leg and some open sores on buttocks, covered with mepilex on leg site. Continued diuresis tonight. Family updated last evening with AM shift.   Neuro: Follows commands. PERRLA.   CV: SR w/BBB.   Resp: LS dim. Tolerating vent.   GI: BS active, +BM. Tube feeding @ goal   : Bobo intact, adequate output. In for strict monitoring.   Pain: CPOT 0.   Activity: Assist of 2 with turns.    Gtt: Fent @ 50. Dex @ 0.8.   Skin: Redness scaley thick skin throughout buttocks and bilateral legs.      Plan:  Continue with plan of cares.

## 2021-12-31 NOTE — CONSULTS
[facility]  Consult Note - Hospitalist Service     Date of Admission:  12/23/2021  Consult Requested by: intensivist  Reason for Consult: assume medical management    Assessment & Plan   Amanda Richardson is a 56 year old female with a past medical history of MS with baclofen pump in place, chronic pain syndrome, diabetes mellitus type II, hypertension, hyperlipidemia, CKD, Non-hodgkin's lymphoma, dehydration, and Obesity who was admitted on 12/23/21 after presenting to ED with altered mental status and identified to be in septic shock. She was intubated on admission and required pressor support for presumed urinary source. She has since weaned from pressor support as well as ventilator and was extubated 12/30/21. Hospitalist was contacted to assume medical management and transfer from ICU.     Septic shock 2/2 UTI, improving  Acute hypoxic respiratory failure 2/2 sepsis, improved  As above, presented with AMS and findings consistent with septic shock requiring pressor support, mechanical ventilation. WBC 34.7 at presentation, procalcitonin 1.63. CT CAP with bibasilar atelectais/infiltrate, air in left renal collecting system raising suspicion for emphysematous pyelitis, cholelithiasis, bilateral oval densities along pelvic sidewall of unclear significance. Initiated on vanco/zosyn on admit, has been on sole tx with zosyn since 12/27.  *BCx x2 on admit positive for staph epidermidis, 1/2 positive for enterococcus faecalis. Repeat blood cultures negative.  *UCx with 10-50k multiple morphotypes without predominant organism. Note appeared to have been drawn after receiving IV Abx  *WBC trending down, value 16.6  - Continue IV Zosyn (12/23), plan for 14d course per ICU  - Wean oxygen as able, BiPAP/CPAP at night per intensivist recs  - RCAT consult  - SLP consulted for swallow eval    Hypernatremia  Na 153. Nutrition following, free water flushes have been increased.  - Continue free water  120ml q4h  - Monitor BMP    Afib  Developed afib in setting of above on 12/24 requiring amiodarone infusion, has since been transitioned to PO amiodarone. Has since converted to NSR, EKG on admit with evidence of new inferior infarct, RBBB. Tele on 12/31 concerning for ST-segment elevations. Patient is asymptomatic without cp/sob. Had mild troponin elevation on admission, attributed to septic shock.  - Tele  - EKG to eval for ST segment changes, if abnormal will pursue further workup  - TTE  - Continue PO amiodarone, will discuss with intensivist/cardiology discontinuing    Acute on CKD, resolved  Baseline Cr nonelevated. Admission value 3.14, improved with resuscitation.  - Monitor BMP  - Avoid nephrotoxins as able    Diabetes mellitus type II  PTA regimen of metformin. Last Hgb A1c 7.1% on 12/24/21.  - Medium ssi  - Accuchecks QID  - Mod CHO diet when able to take PO    Hypertension  Hyperlipidemia  PTA regimen of losartan-hydrochlorothiazide 50-12.5, metoprolol 50mg daily.  - Resume PTA metoprolol, statin  - Continue holding PTA losartan-hydrochlorothiazide, resume as BP allows    Right proximal femur fracture, chronic  Has not been repaired 2/2 pandemic per pt/family decision.    Depression  - PTA duloxetine, amitriptyline       The patient's care was discussed with the Attending Physician, Dr. Paiz, Bedside Nurse, Patient and Patient's Family.    Loco Hughes PA-C  [facility]  Securely message with the [employer] Web Console (learn more here)  Text page via [employer] Paging/Directory        Clinically Significant Risk Factors Present on Admission                    ______________________________________________________________________      History of Present Illness   Amanda Richardson is a 56 year old female with a past medical history of MS with baclofen pump in place, chronic pain syndrome, diabetes mellitus type II, hypertension, hyperlipidemia, CKD, Non-hodgkin's lymphoma,  dehydration, and Obesity who was admitted on 12/23/21 after presenting to ED with altered mental status and identified to be in septic shock with leukocytosis to 34.7 and procalcitonin 1.63.  CT CAP with bibasilar atelectais/infiltrate, air in left renal collecting system raising suspicion for emphysematous pyelitis, cholelithiasis, bilateral oval densities along pelvic sidewall of unclear significance. She required intubation upon arrival in ED and was started on broad-spectrum antibiotics as well as pressor support. UA was abnormal with suspected source, UCx resulted with multiple organisms however was obtained after antibiotics given. Patient has since weaned from pressor support as well as ventilator and was extubated 12/30/21. She is currently receiving nutrition via [place] with nutrition managing. ICU stay was complicated by development of afib with RVR requiring amiodarone infusion on 12/24. Hospitalist was contacted to assume medical management and transfer from ICU.     Pt is presently evaluated in hospital room. She is awake and able to participate in limited conversation. Denies cp, sob. Asking to eat, bedside RN has ordered speech therapy for evaluation. Afebrile. Bedside RN notes intermittent loose stools, not persistent. IS @ 500 per RN.    Review of Systems   The 10 point Review of Systems is negative other than noted in the HPI or here.     Past Medical History    I have reviewed this patient's medical history and updated it with pertinent information if needed.   Past Medical History:   Diagnosis Date     Abnormality of gait 07/27/2012     Arrhythmia     with sepsis     Chronic pain     [facility] - yearly, next in summer 2018     CKD (chronic kidney disease) stage 1, GFR 90 ml/min or greater     kidney stones     Colon polyps 01/2015    tubular adenomas x 2     Depressive disorder      Gallstone 06/11/2012     Hyperlipidemia LDL goal <70      Hypertension goal BP (blood pressure) < 140/90       Leukocytosis 06/11/2012     Moderate depressive episode (H)      MS (multiple sclerosis) (H) 2003    Dr Vigil/Gaby - [facility]     Multiple sclerosis (H)      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  Past Surgical History:   Procedure Laterality Date     COLONOSCOPY N/A 1/7/2015    tubular adenomas x 2 - due 5 yrs     COMBINED CYSTOSCOPY, RETROGRADES, URETEROSCOPY, INSERT STENT Left 1/30/2019    Procedure: 1. Cystoscopy 2. LEFT retrograde pyelogram 3. LEFT JJ stent placement 4. <1hr physician fluoroscopy time;  Surgeon: Epifanio Sapp MD;  Location:  OR     COMBINED CYSTOSCOPY, RETROGRADES, URETEROSCOPY, LASER HOLMIUM LITHOTRIPSY URETER(S), INSERT STENT Left 3/15/2019    Procedure: Cystoscopy, left ureteral stent exchange, left retrograde pyelogram, interpretation of fluoroscopic images, left ureteroscopy with holmium lithotripsy and stone basketing, 22 modifier for difficult lengthy case.;  Surgeon: Mayito Chauhan MD;  Location:  OR     CYSTOSCOPY        CYSTOSCOPY, REMOVE STENT(S), COMBINED Left 3/27/2019    Procedure: Flexible cystoscopy with left ureteral stent removal;  Surgeon: Mayito Chauhan MD;  Location: [facility] OR     FUSION LUMBAR ANTERIOR, FUSION LUMBAR POSTERIOR TWO LEVELS, COMBINED  10/17/2013    lumbar fusion - Dr Floyd     INSERT PUMP BACLOFEN  2017    intrathecal baclofen pump implantation     IRRIGATION AND DEBRIDEMENT LOWER EXTREMITY, COMBINED Right     Right ankle I&D d/t infection     OPEN REDUCTION INTERNAL FIXATION ANKLE  5/15    Right Bimalleolar ankle fx ORIF     OPEN REDUCTION INTERNAL FIXATION ANKLE Right 2015    Revision due to spasms pulling screws out of ankle     SINUS SURGERY      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  Social History     Tobacco Use     Smoking status: Former Smoker     Packs/day: 0.50     Years: 31.00     Pack years: 15.50     Types: Cigarettes     Quit date: 2013     Years since quittin.4     Smokeless tobacco: Never Used     Tobacco comment: since age 17   Vaping Use     Vaping Use: Never used   Substance Use Topics     Alcohol use: No     Drug use: No       Family History   I have reviewed this patient's family history and updated it with pertinent information if needed.  Family History   Problem Relation Age of Onset     C.A.D. Father         with CHF      Cancer Father         ? unsure type - abdominal      Hypertension Mother      Thyroid Disease Mother         goiter      Breast Cancer Sister 58     Thyroid Disease Son      Cancer Paternal Grandfather      Cancer - colorectal No family hx of        Medications   Medications Prior to Admission   Medication Sig Dispense Refill Last Dose     acetaminophen (TYLENOL) 325 MG tablet Take 2 tablets (650 mg) by mouth every  4 hours as needed for mild pain 30 tablet 1 Unknown at Unknown time     aspirin (ASA) 81 MG chewable tablet Take 162 mg by mouth every morning    Unknown at Unknown time     atorvastatin (LIPITOR) 10 MG tablet Take 1 tablet (10 mg) by mouth daily 90 tablet 3 Unknown at Unknown time     B Complex Vitamins (B COMPLEX PO) Take 1 tablet by mouth daily   Unknown at Unknown time     baclofen (LIORESAL) 10 MG tablet 10 mg by Per G Tube route 3 times daily as needed for muscle spasms In addition to pump   Unknown at Unknown time     baclofen (LIORESAL) intraTHECAL Internal Pump by Intrathecal route continuous prn (467.6 mcg/24h simple continuous infusion) Dr. Griffin [facility] manages, pump refill due 5/2022. Settings last updated 12/13/2021.   12/24/2021 at Unknown time     CHLORPHENIRAMINE-ACETAMINOPHEN PO Take 2 tablets by mouth Tablet contains acetaminophen 500 mg, dextromethorphan 15 mg, chlorpheniramine 2 mg   Unknown at Unknown time     Chlorpheniramine-DM (COUGH & COLD HBP) 4-30 MG TABS Take 1 tablet by mouth every 6 hours as needed   Unknown at Unknown time     doxycycline hyclate (VIBRA-TABS) 100 MG tablet Take 1 tablet (100 mg) by mouth 2 times daily for 10 days 20 tablet 0 12/23/2021 at 15 tablets remain in Rx bottle     DULoxetine (CYMBALTA) 60 MG capsule Take 1 capsule (60 mg) by mouth 2 times daily 180 capsule 3 Unknown at Unknown time     Hesperidin-Diosmin 250-650 MG TABS 2 tabs daily per wound care 180 tablet 3 Unknown at Unknown time     ibuprofen (ADVIL/MOTRIN) 200 MG tablet Take 200 mg by mouth every 4 hours as needed for mild pain   Unknown at Unknown time     losartan-hydrochlorothiazide (HYZAAR) 50-12.5 MG tablet Take 1 tablet by mouth daily 90 tablet 3 Unknown at Unknown time     metFORMIN (GLUCOPHAGE) 500 MG tablet 1 Tabs QAM and 2 Tabs  tablet 3 Unknown at Unknown time     metoprolol succinate ER (TOPROL-XL) 50 MG 24 hr tablet Take 1 tablet (50 mg) by mouth daily 90 tablet 3 Unknown  at Unknown time     Multiple Vitamin (MULTI-VITAMIN PO) Take 1 tablet by mouth daily    Unknown at Unknown time     naloxone (NARCAN) 4 MG/0.1ML nasal spray Spray 1 spray (4 mg) into one nostril alternating nostrils once as needed for opioid reversal Every 2-3 minutes until patient responsive or EMS arrives 0.2 mL 0 Unknown at spouse confirmed pt has home supply     omega-3 fatty acids (FISH OIL) 1200 MG capsule Take 1 capsule by mouth daily.   Unknown at Unknown time     oxyCODONE IR (ROXICODONE) 15 MG tablet Take 1 tablet (15 mg) by mouth every 6 hours as needed for moderate to severe pain Limit 4 tablet(s) per day. 120 tablet 0 Unknown at Unknown time     senna-docusate (SENOKOT-S/PERICOLACE) 8.6-50 MG tablet Take 2 tablets by mouth daily as needed for constipation 180 tablet 0 Unknown at Unknown time     Vitamin D3 (CHOLECALCIFEROL) 125 MCG (5000 UT) tablet Take 2 tablets by mouth every morning   Unknown at Unknown time     amitriptyline (ELAVIL) 100 MG tablet Take 1 tablet (100 mg) by mouth At Bedtime (Patient not taking: Reported on 12/24/2021) 90 tablet 3 Not Taking at Unknown time     blood glucose (ACCU-CHEK KEL) test strip Use to test blood sugar 1 times daily or as directed. 100 strip 5      blood glucose (NO BRAND SPECIFIED) lancets standard Use to test blood sugar 1 times daily or as directed. 100 each 5      blood glucose calibration (NO BRAND SPECIFIED) solution Use to calibrate blood glucose monitor as needed as directed. 1 each 0      blood glucose monitoring (ACCU-CHEK KEL PLUS) meter device kit Use to test blood sugar 1 times daily or as directed. 1 kit 0      blood glucose monitoring (ACCU-CHEK MULTICLIX) lancets Use to test blood sugar 1 times daily or as directed. 100 each 5        Allergies   Allergies   Allergen Reactions     Lisinopril Angioedema     Lip swelling     Flexeril [Cyclobenzaprine Hcl] Other (See Comments)     confusion       Physical Exam   Vital Signs: Temp: 97.9  F (36.6   C) Temp src: Oral BP: 129/78 Pulse: 89   Resp: 25 SpO2: 93 % O2 Device: Oxymask Oxygen Delivery: 8 LPM  Weight: 274 lbs 0 oz    GEN: well-developed, well-nourished, appears comfortable  HEENT: NCAT, EOM intact bilaterally, sclera clear, conjunctiva normal, nose & mouth patent, mucous membranes moist  CHEST: lungs coarse bilaterally, shallow inspiration, no increased work of breathing  HEART: RRR, S1 & S2, no murmur  ABD: soft, nontender, nondistended, no guarding or rigidity, +BS in all 4 quadrants  MSK: AROM bilateral UE/LE, pedal & radial pulses 2+ bilaterally  NEURO: awake, alert, oriented to name. CN II-XII grossly intact. Sensation grossly intact to light touch.   SKIN: warm & dry without rash, bilateral lower legs with skin changes c/w chronic venous stasis, no warmth    Data   Results for orders placed or performed during the hospital encounter of 12/23/21 (from the past 24 hour(s))   Glucose by meter   Result Value Ref Range    GLUCOSE BY METER POCT 131 (H) 70 - 99 mg/dL   Glucose by meter   Result Value Ref Range    GLUCOSE BY METER POCT 119 (H) 70 - 99 mg/dL   Glucose by meter   Result Value Ref Range    GLUCOSE BY METER POCT 104 (H) 70 - 99 mg/dL   Glucose by meter   Result Value Ref Range    GLUCOSE BY METER POCT 116 (H) 70 - 99 mg/dL   Asymptomatic COVID-19 Virus (Coronavirus) by PCR Nasopharyngeal    Specimen: Nasopharyngeal; Swab   Result Value Ref Range    SARS CoV2 PCR Negative Negative    Narrative    Testing was performed using the carmine  SARS-CoV-2 & Influenza A/B Assay on the carmine  Pamela  System.  This test should be ordered for the detection of SARS-COV-2 in individuals who meet SARS-CoV-2 clinical and/or epidemiological criteria. Test performance is unknown in asymptomatic patients.  This test is for in vitro diagnostic use under the FDA EUA for laboratories certified under CLIA to perform moderate and/or high complexity testing. This test has not been FDA cleared or approved.  A negative  test does not rule out the presence of PCR inhibitors in the specimen or target RNA in concentration below the limit of detection for the assay. The possibility of a false negative should be considered if the patient's recent exposure or clinical presentation suggests COVID-19.  [facility] are certified under the Clinical Laboratory Improvement Amendments of 1988 (CLIA-88) as qualified to perform moderate and/or high complexity laboratory testing.   Comprehensive metabolic panel   Result Value Ref Range    Sodium 153 (H) 133 - 144 mmol/L    Potassium 3.2 (L) 3.4 - 5.3 mmol/L    Chloride 119 (H) 94 - 109 mmol/L    Carbon Dioxide (CO2) 31 20 - 32 mmol/L    Anion Gap 3 3 - 14 mmol/L    Urea Nitrogen 26 7 - 30 mg/dL    Creatinine 0.93 0.52 - 1.04 mg/dL    Calcium 7.8 (L) 8.5 - 10.1 mg/dL    Glucose 151 (H) 70 - 99 mg/dL    Alkaline Phosphatase 152 (H) 40 - 150 U/L    AST 49 (H) 0 - 45 U/L     (H) 0 - 50 U/L    Protein Total 7.4 6.8 - 8.8 g/dL    Albumin 1.9 (L) 3.4 - 5.0 g/dL    Bilirubin Total 1.2 0.2 - 1.3 mg/dL    GFR Estimate 71 >60 mL/min/1.73m2   CBC with platelets   Result Value Ref Range    WBC Count 16.6 (H) 4.0 - 11.0 10e3/uL    RBC Count 5.36 (H) 3.80 - 5.20 10e6/uL    Hemoglobin 11.4 (L) 11.7 - 15.7 g/dL    Hematocrit 43.7 35.0 - 47.0 %    MCV 82 78 - 100 fL    MCH 21.3 (L) 26.5 - 33.0 pg    MCHC 26.1 (L) 31.5 - 36.5 g/dL    RDW 22.0 (H) 10.0 - 15.0 %    Platelet Count 238 150 - 450 10e3/uL   Glucose by meter   Result Value Ref Range    GLUCOSE BY METER POCT 117 (H) 70 - 99 mg/dL

## 2021-12-31 NOTE — PROGRESS NOTES
12/31/21 1406   General Information   Onset of Illness/Injury or Date of Surgery 12/23/21   Referring Physician Loco Hughes PA-C   Patient/Family Therapy Goal Statement (SLP) The pt would like to eat/drink.   Pertinent History of Current Problem The pt is a 56 year old female with a past medical history of MS with baclofen pump in place, chronic pain syndrome, diabetes mellitus type II, hypertension, hyperlipidemia, CKD, Non-hodgkin's lymphoma, dehydration, and Obesity who was admitted on 12/23/21 after presenting to ED with altered mental status and identified to be in septic shock. She was intubated on admission and required pressor support for presumed urinary source. She has since weaned from pressor support as well as ventilator and was extubated 12/30/21. Clinical swallow evaluation ordered post-extubation.   General Observations The pt is very pleasant and agreeable to evaluation. Pt's  is present and supportive.   Past History of Dysphagia The pt denies a hx of dysphagia. She underwent a clinical swallow in 2019 with recommendations for regular solids and thin liquids d/t no appreciable oropharyngeal dysphagia.   Pain Assessment   Patient Currently in Pain Yes, see Vital Sign flowsheet   Type of Evaluation   Type of Evaluation Swallow Evaluation   Oral Motor   Oral Musculature generally intact   Structural Abnormalities none present   Mucosal Quality dry   Dentition (Oral Motor)   Dentition (Oral Motor) natural dentition   Facial Symmetry (Oral Motor)   Facial Symmetry (Oral Motor) WNL   Lip Function (Oral Motor)   Lip Range of Motion (Oral Motor) WNL   Tongue Function (Oral Motor)   Tongue ROM (Oral Motor) WNL   Jaw Function (Oral Motor)   Jaw Function (Oral Motor) WNL   Cough/Swallow/Gag Reflex (Oral Motor)   Volitional Throat Clear/Cough (Oral Motor) WNL   Volitional Swallow (Oral Motor) xerostomia   Vocal Quality/Secretion Management (Oral Motor)   Vocal Quality (Oral Motor) hoarse  (mildly  hoarse)   Secretion Management (Oral Motor) WNL   General Swallowing Observations   Current Diet/Method of Nutritional Intake (General Swallowing Observations, NIS) NPO   Respiratory Support (General Swallowing Observations) oxygen mask  (8 lpm)   Swallowing Evaluation Clinical swallow evaluation   Clinical Swallow Evaluation   Feeding Assistance frequent cues/help required  (d/t UE weakness)   Clinical Swallow Evaluation Textures Trialed thin liquids;pureed   Clinical Swallow Eval: Thin Liquid Texture Trial   Mode of Presentation, Thin Liquids spoon;straw;self-fed;fed by clinician   Volume of Liquid or Food Presented 6 oz   Oral Phase of Swallow WFL   Pharyngeal Phase of Swallow intact   Diagnostic Statement Pt consumed thin liquids via spoon/straw with no s/s of aspiration. Vocal quality remained clear and pt only required 1 swallow per bolus.   Clinical Swallow Evaluation: Puree Solid Texture Trial   Mode of Presentation, Puree spoon;self-fed;fed by clinician   Volume of Puree Presented 3 oz   Oral Phase, Puree effortful AP movement   Pharyngeal Phase, Puree intact   Diagnostic Statement Pt tolerated puree solids with no s/s of aspiration. A-P propulsion was mildly effortful d/t xerostomia (r/t intubation) and respiratory rate.   Esophageal Phase of Swallow   Patient reports or presents with symptoms of esophageal dysphagia Yes   Esophageal comments Intermittent belching noted with liquid intake.   Swallowing Recommendations   Diet Consistency Recommendations thin liquids (level 0);full liquid diet   Supervision Level for Intake 1:1 supervision needed   Mode of Delivery Recommendations bolus size, small;slow rate of intake   Swallowing Maneuver Recommendations effortful (hard) swallow   Monitoring/Assistance Required (Eating/Swallowing) stop eating activities when fatigue is present;monitor for cough or change in vocal quality with intake   Recommended Feeding/Eating Techniques (Swallow Eval) maintain upright  sitting position for eating   Medication Administration Recommendations, Swallowing (SLP) Crushed with applesauce   Instrumental Assessment Recommendations instrumental evaluation not recommended at this time   General Therapy Interventions   Planned Therapy Interventions Dysphagia Treatment   Dysphagia treatment Modified diet education;Instruction of safe swallow strategies;Compensatory strategies for swallowing   SLP Therapy Assessment/Plan   Criteria for Skilled Therapeutic Interventions Met (SLP Eval) yes;treatment indicated   SLP Diagnosis Mild-moderate oropharyngeal dysphagia s/p extubation   Rehab Potential (SLP Eval) good, to achieve stated therapy goals   Therapy Frequency (SLP Eval) 5 times/wk   Predicted Duration of Therapy Intervention (SLP Eval) 1-2 weeks   Comment, Therapy Assessment/Plan (SLP) Clinical swallow evaluation completed per MD order. Pt presents with mild-moderate oropharyngeal dysphagia s/p prolonged intubation. Oral mech was WFL except for mildly hoarse vocal quality. Pt's  reports that her voice is improved since yesterday. Pt consumed 6 oz thin liquids with no s/s of aspiration. Bolus control and propulsion was timely. Vocal quality remained clear. Pt also consumed 3 oz puree with no s/s of aspiration. A-P propulsion was mildly effortful d/t xerostomia and respiratory rate. Pt endorsed sensation of pharyngeal residuals x1 with puree which subjectively cleared with liquid wash. Recommend advance to full liquid diet with thin liquids. Pt to sit upright with PO, take small bites/sips, and avoid PO if SOB or overly fatigued. SLP will follow for diet tolerance and advance diet as appropriate.   Therapy Plan Review/Discharge Plan (SLP)   Therapy Plan Review (SLP) evaluation/treatment results reviewed;care plan/treatment goals reviewed;current/potential barriers reviewed;participants voiced agreement with care plan;participants included;patient;spouse/significant other   SLP Discharge  Planning    SLP Discharge Recommendation (DC Rec) home with home care speech therapy   SLP Rationale for DC Rec Dysphagia is below baseline; pending progress pt may benefit from ongoing SLP services   SLP Brief overview of current status  Recommend advance to full liquid diet with thin liquids. Pt to sit upright with PO, take small bites/sips, and avoid PO if SOB or overly fatigued. SLP will follow for diet tolerance and advance diet as appropriate.    Total Evaluation Time   Total Evaluation Time (Minutes) 20

## 2021-12-31 NOTE — PROGRESS NOTES
Pt transferred to room 306. Report called to FREDO Gurrola prior to time of transport. All belongings, meds, and pt chart sent with pt.   Evie Bernardo RN on 12/31/2021 at 3:05 PM

## 2022-01-01 ENCOUNTER — APPOINTMENT (OUTPATIENT)
Dept: GENERAL RADIOLOGY | Facility: CLINIC | Age: 57
DRG: 853 | End: 2022-01-01
Attending: HOSPITALIST
Payer: COMMERCIAL

## 2022-01-01 ENCOUNTER — APPOINTMENT (OUTPATIENT)
Dept: GENERAL RADIOLOGY | Facility: CLINIC | Age: 57
DRG: 853 | End: 2022-01-01
Attending: PHYSICIAN ASSISTANT
Payer: COMMERCIAL

## 2022-01-01 ENCOUNTER — APPOINTMENT (OUTPATIENT)
Dept: SPEECH THERAPY | Facility: CLINIC | Age: 57
DRG: 853 | End: 2022-01-01
Payer: COMMERCIAL

## 2022-01-01 ENCOUNTER — APPOINTMENT (OUTPATIENT)
Dept: GENERAL RADIOLOGY | Facility: CLINIC | Age: 57
DRG: 853 | End: 2022-01-01
Attending: PEDIATRICS
Payer: COMMERCIAL

## 2022-01-01 ENCOUNTER — APPOINTMENT (OUTPATIENT)
Dept: GENERAL RADIOLOGY | Facility: CLINIC | Age: 57
DRG: 853 | End: 2022-01-01
Attending: NURSE PRACTITIONER
Payer: COMMERCIAL

## 2022-01-01 ENCOUNTER — ANESTHESIA EVENT (OUTPATIENT)
Dept: INTENSIVE CARE | Facility: CLINIC | Age: 57
DRG: 853 | End: 2022-01-01
Payer: COMMERCIAL

## 2022-01-01 ENCOUNTER — ANESTHESIA (OUTPATIENT)
Dept: INTENSIVE CARE | Facility: CLINIC | Age: 57
DRG: 853 | End: 2022-01-01
Payer: COMMERCIAL

## 2022-01-01 ENCOUNTER — APPOINTMENT (OUTPATIENT)
Dept: CARDIOLOGY | Facility: CLINIC | Age: 57
DRG: 853 | End: 2022-01-01
Attending: PHYSICIAN ASSISTANT
Payer: COMMERCIAL

## 2022-01-01 ENCOUNTER — APPOINTMENT (OUTPATIENT)
Dept: GENERAL RADIOLOGY | Facility: CLINIC | Age: 57
DRG: 853 | End: 2022-01-01
Attending: INTERNAL MEDICINE
Payer: COMMERCIAL

## 2022-01-01 ENCOUNTER — APPOINTMENT (OUTPATIENT)
Dept: GENERAL RADIOLOGY | Facility: CLINIC | Age: 57
DRG: 853 | End: 2022-01-01
Attending: STUDENT IN AN ORGANIZED HEALTH CARE EDUCATION/TRAINING PROGRAM
Payer: COMMERCIAL

## 2022-01-01 ENCOUNTER — APPOINTMENT (OUTPATIENT)
Dept: CT IMAGING | Facility: CLINIC | Age: 57
DRG: 853 | End: 2022-01-01
Attending: NURSE PRACTITIONER
Payer: COMMERCIAL

## 2022-01-01 ENCOUNTER — APPOINTMENT (OUTPATIENT)
Dept: ULTRASOUND IMAGING | Facility: CLINIC | Age: 57
DRG: 853 | End: 2022-01-01
Attending: NURSE PRACTITIONER
Payer: COMMERCIAL

## 2022-01-01 ENCOUNTER — APPOINTMENT (OUTPATIENT)
Dept: CARDIOLOGY | Facility: CLINIC | Age: 57
DRG: 853 | End: 2022-01-01
Attending: NURSE PRACTITIONER
Payer: COMMERCIAL

## 2022-01-01 ENCOUNTER — APPOINTMENT (OUTPATIENT)
Dept: OCCUPATIONAL THERAPY | Facility: CLINIC | Age: 57
DRG: 853 | End: 2022-01-01
Payer: COMMERCIAL

## 2022-01-01 ENCOUNTER — APPOINTMENT (OUTPATIENT)
Dept: GENERAL RADIOLOGY | Facility: CLINIC | Age: 57
DRG: 853 | End: 2022-01-01
Payer: COMMERCIAL

## 2022-01-01 ENCOUNTER — APPOINTMENT (OUTPATIENT)
Dept: OCCUPATIONAL THERAPY | Facility: CLINIC | Age: 57
DRG: 853 | End: 2022-01-01
Attending: HOSPITALIST
Payer: COMMERCIAL

## 2022-01-01 ENCOUNTER — APPOINTMENT (OUTPATIENT)
Dept: PHYSICAL THERAPY | Facility: CLINIC | Age: 57
DRG: 853 | End: 2022-01-01
Attending: HOSPITALIST
Payer: COMMERCIAL

## 2022-01-01 ENCOUNTER — APPOINTMENT (OUTPATIENT)
Dept: CT IMAGING | Facility: CLINIC | Age: 57
DRG: 853 | End: 2022-01-01
Attending: HOSPITALIST
Payer: COMMERCIAL

## 2022-01-01 ENCOUNTER — APPOINTMENT (OUTPATIENT)
Dept: CT IMAGING | Facility: CLINIC | Age: 57
DRG: 853 | End: 2022-01-01
Payer: COMMERCIAL

## 2022-01-01 ENCOUNTER — APPOINTMENT (OUTPATIENT)
Dept: ULTRASOUND IMAGING | Facility: CLINIC | Age: 57
DRG: 853 | End: 2022-01-01
Attending: HOSPITALIST
Payer: COMMERCIAL

## 2022-01-01 VITALS
TEMPERATURE: 99.4 F | DIASTOLIC BLOOD PRESSURE: 75 MMHG | HEIGHT: 69 IN | SYSTOLIC BLOOD PRESSURE: 110 MMHG | OXYGEN SATURATION: 93 % | BODY MASS INDEX: 40.85 KG/M2 | WEIGHT: 275.8 LBS

## 2022-01-01 LAB
% LINING CELLS, BODY FLUID: 1 %
% LINING CELLS, BODY FLUID: 61 %
1,3 BETA GLUCAN SER-MCNC: ABNORMAL PG/ML
ABO/RH(D): NORMAL
ABO/RH(D): NORMAL
ALBUMIN SERPL-MCNC: 1.7 G/DL (ref 3.4–5)
ALBUMIN SERPL-MCNC: 1.9 G/DL (ref 3.4–5)
ALBUMIN SERPL-MCNC: 1.9 G/DL (ref 3.4–5)
ALBUMIN SERPL-MCNC: 2 G/DL (ref 3.4–5)
ALBUMIN SERPL-MCNC: 2.1 G/DL (ref 3.4–5)
ALBUMIN SERPL-MCNC: 2.1 G/DL (ref 3.4–5)
ALBUMIN UR-MCNC: 300 MG/DL
ALP SERPL-CCNC: 112 U/L (ref 40–150)
ALP SERPL-CCNC: 121 U/L (ref 40–150)
ALP SERPL-CCNC: 123 U/L (ref 40–150)
ALP SERPL-CCNC: 130 U/L (ref 40–150)
ALP SERPL-CCNC: 132 U/L (ref 40–150)
ALP SERPL-CCNC: 135 U/L (ref 40–150)
ALP SERPL-CCNC: 135 U/L (ref 40–150)
ALP SERPL-CCNC: 147 U/L (ref 40–150)
ALP SERPL-CCNC: 154 U/L (ref 40–150)
ALT SERPL W P-5'-P-CCNC: 109 U/L (ref 0–50)
ALT SERPL W P-5'-P-CCNC: 129 U/L (ref 0–50)
ALT SERPL W P-5'-P-CCNC: 162 U/L (ref 0–50)
ALT SERPL W P-5'-P-CCNC: 183 U/L (ref 0–50)
ALT SERPL W P-5'-P-CCNC: 265 U/L (ref 0–50)
ALT SERPL W P-5'-P-CCNC: 42 U/L (ref 0–50)
ALT SERPL W P-5'-P-CCNC: 60 U/L (ref 0–50)
ALT SERPL W P-5'-P-CCNC: 70 U/L (ref 0–50)
ALT SERPL W P-5'-P-CCNC: 93 U/L (ref 0–50)
ANION GAP SERPL CALCULATED.3IONS-SCNC: 1 MMOL/L (ref 3–14)
ANION GAP SERPL CALCULATED.3IONS-SCNC: 2 MMOL/L (ref 3–14)
ANION GAP SERPL CALCULATED.3IONS-SCNC: 3 MMOL/L (ref 3–14)
ANION GAP SERPL CALCULATED.3IONS-SCNC: 3 MMOL/L (ref 3–14)
ANION GAP SERPL CALCULATED.3IONS-SCNC: 4 MMOL/L (ref 3–14)
ANION GAP SERPL CALCULATED.3IONS-SCNC: 5 MMOL/L (ref 3–14)
ANION GAP SERPL CALCULATED.3IONS-SCNC: 7 MMOL/L (ref 3–14)
ANION GAP SERPL CALCULATED.3IONS-SCNC: 8 MMOL/L (ref 3–14)
ANION GAP SERPL CALCULATED.3IONS-SCNC: <1 MMOL/L (ref 3–14)
ANTIBODY SCREEN: NEGATIVE
ANTIBODY SCREEN: NEGATIVE
APPEARANCE FLD: ABNORMAL
APPEARANCE UR: ABNORMAL
AST SERPL W P-5'-P-CCNC: 20 U/L (ref 0–45)
AST SERPL W P-5'-P-CCNC: 23 U/L (ref 0–45)
AST SERPL W P-5'-P-CCNC: 25 U/L (ref 0–45)
AST SERPL W P-5'-P-CCNC: 28 U/L (ref 0–45)
AST SERPL W P-5'-P-CCNC: 32 U/L (ref 0–45)
AST SERPL W P-5'-P-CCNC: 39 U/L (ref 0–45)
AST SERPL W P-5'-P-CCNC: 39 U/L (ref 0–45)
AST SERPL W P-5'-P-CCNC: 42 U/L (ref 0–45)
AST SERPL W P-5'-P-CCNC: 59 U/L (ref 0–45)
ATRIAL RATE - MUSE: 102 BPM
ATRIAL RATE - MUSE: 88 BPM
ATRIAL RATE - MUSE: 89 BPM
ATRIAL RATE - MUSE: 95 BPM
BACTERIA BLD CULT: NO GROWTH
BACTERIA BLD CULT: NO GROWTH
BACTERIA BRONCH: ABNORMAL
BACTERIA PLR CULT: NO GROWTH
BACTERIA PLR CULT: NO GROWTH
BACTERIA PLR CULT: NORMAL
BACTERIA UR CULT: ABNORMAL
BASE EXCESS BLDA CALC-SCNC: 10.2 MMOL/L (ref -9–1.8)
BASE EXCESS BLDA CALC-SCNC: 10.7 MMOL/L (ref -9–1.8)
BASE EXCESS BLDA CALC-SCNC: 11.2 MMOL/L (ref -9–1.8)
BASE EXCESS BLDA CALC-SCNC: 13.1 MMOL/L (ref -9–1.8)
BASE EXCESS BLDA CALC-SCNC: 13.5 MMOL/L (ref -9–1.8)
BASE EXCESS BLDA CALC-SCNC: 14.1 MMOL/L (ref -9–1.8)
BASE EXCESS BLDA CALC-SCNC: 14.2 MMOL/L (ref -9–1.8)
BASE EXCESS BLDA CALC-SCNC: 14.5 MMOL/L (ref -9–1.8)
BASE EXCESS BLDA CALC-SCNC: 16.2 MMOL/L (ref -9–1.8)
BASE EXCESS BLDA CALC-SCNC: 16.6 MMOL/L (ref -9–1.8)
BASE EXCESS BLDA CALC-SCNC: 16.7 MMOL/L (ref -9–1.8)
BASE EXCESS BLDA CALC-SCNC: 18.1 MMOL/L (ref -9–1.8)
BASE EXCESS BLDA CALC-SCNC: 18.1 MMOL/L (ref -9–1.8)
BASE EXCESS BLDA CALC-SCNC: 19.5 MMOL/L (ref -9–1.8)
BASE EXCESS BLDA CALC-SCNC: 4.6 MMOL/L (ref -9–1.8)
BASE EXCESS BLDA CALC-SCNC: 5 MMOL/L (ref -9–1.8)
BASE EXCESS BLDA CALC-SCNC: 6.8 MMOL/L (ref -9–1.8)
BASE EXCESS BLDA CALC-SCNC: 7.1 MMOL/L (ref -9–1.8)
BASE EXCESS BLDA CALC-SCNC: 7.7 MMOL/L (ref -9–1.8)
BASE EXCESS BLDA CALC-SCNC: 7.8 MMOL/L (ref -9–1.8)
BASE EXCESS BLDA CALC-SCNC: 8 MMOL/L (ref -9–1.8)
BASE EXCESS BLDA CALC-SCNC: 8 MMOL/L (ref -9–1.8)
BASE EXCESS BLDA CALC-SCNC: 8.1 MMOL/L (ref -9–1.8)
BASE EXCESS BLDV CALC-SCNC: 14 MMOL/L (ref -7.7–1.9)
BASE EXCESS BLDV CALC-SCNC: 15.3 MMOL/L (ref -7.7–1.9)
BASOPHILS # BLD AUTO: 0.1 10E3/UL (ref 0–0.2)
BASOPHILS NFR BLD AUTO: 1 %
BILIRUB DIRECT SERPL-MCNC: 1.1 MG/DL (ref 0–0.2)
BILIRUB SERPL-MCNC: 0.5 MG/DL (ref 0.2–1.3)
BILIRUB SERPL-MCNC: 0.6 MG/DL (ref 0.2–1.3)
BILIRUB SERPL-MCNC: 0.6 MG/DL (ref 0.2–1.3)
BILIRUB SERPL-MCNC: 0.7 MG/DL (ref 0.2–1.3)
BILIRUB SERPL-MCNC: 0.9 MG/DL (ref 0.2–1.3)
BILIRUB SERPL-MCNC: 1.3 MG/DL (ref 0.2–1.3)
BILIRUB UR QL STRIP: NEGATIVE
BLD PROD TYP BPU: NORMAL
BLOOD COMPONENT TYPE: NORMAL
BUN SERPL-MCNC: 15 MG/DL (ref 7–30)
BUN SERPL-MCNC: 17 MG/DL (ref 7–30)
BUN SERPL-MCNC: 18 MG/DL (ref 7–30)
BUN SERPL-MCNC: 18 MG/DL (ref 7–30)
BUN SERPL-MCNC: 19 MG/DL (ref 7–30)
BUN SERPL-MCNC: 19 MG/DL (ref 7–30)
BUN SERPL-MCNC: 20 MG/DL (ref 7–30)
BUN SERPL-MCNC: 23 MG/DL (ref 7–30)
BUN SERPL-MCNC: 23 MG/DL (ref 7–30)
BUN SERPL-MCNC: 28 MG/DL (ref 7–30)
BUN SERPL-MCNC: 29 MG/DL (ref 7–30)
BUN SERPL-MCNC: 29 MG/DL (ref 7–30)
BUN SERPL-MCNC: 30 MG/DL (ref 7–30)
BUN SERPL-MCNC: 33 MG/DL (ref 7–30)
BUN SERPL-MCNC: 33 MG/DL (ref 7–30)
BUN SERPL-MCNC: 34 MG/DL (ref 7–30)
BUN SERPL-MCNC: 36 MG/DL (ref 7–30)
BUN SERPL-MCNC: 40 MG/DL (ref 7–30)
BUN SERPL-MCNC: 40 MG/DL (ref 7–30)
BUN SERPL-MCNC: 43 MG/DL (ref 7–30)
BUN SERPL-MCNC: 45 MG/DL (ref 7–30)
BUN SERPL-MCNC: 45 MG/DL (ref 7–30)
BUN SERPL-MCNC: 48 MG/DL (ref 7–30)
BUN SERPL-MCNC: 49 MG/DL (ref 7–30)
BUN SERPL-MCNC: 59 MG/DL (ref 7–30)
BUN SERPL-MCNC: 62 MG/DL (ref 7–30)
BUN SERPL-MCNC: 66 MG/DL (ref 7–30)
BUN SERPL-MCNC: 66 MG/DL (ref 7–30)
CALCIUM SERPL-MCNC: 7.5 MG/DL (ref 8.5–10.1)
CALCIUM SERPL-MCNC: 7.6 MG/DL (ref 8.5–10.1)
CALCIUM SERPL-MCNC: 7.6 MG/DL (ref 8.5–10.1)
CALCIUM SERPL-MCNC: 7.7 MG/DL (ref 8.5–10.1)
CALCIUM SERPL-MCNC: 7.7 MG/DL (ref 8.5–10.1)
CALCIUM SERPL-MCNC: 7.8 MG/DL (ref 8.5–10.1)
CALCIUM SERPL-MCNC: 7.9 MG/DL (ref 8.5–10.1)
CALCIUM SERPL-MCNC: 8 MG/DL (ref 8.5–10.1)
CALCIUM SERPL-MCNC: 8 MG/DL (ref 8.5–10.1)
CALCIUM SERPL-MCNC: 8.1 MG/DL (ref 8.5–10.1)
CALCIUM SERPL-MCNC: 8.2 MG/DL (ref 8.5–10.1)
CALCIUM SERPL-MCNC: 8.3 MG/DL (ref 8.5–10.1)
CALCIUM SERPL-MCNC: 8.4 MG/DL (ref 8.5–10.1)
CALCIUM SERPL-MCNC: 8.5 MG/DL (ref 8.5–10.1)
CHLORIDE BLD-SCNC: 102 MMOL/L (ref 94–109)
CHLORIDE BLD-SCNC: 103 MMOL/L (ref 94–109)
CHLORIDE BLD-SCNC: 104 MMOL/L (ref 94–109)
CHLORIDE BLD-SCNC: 105 MMOL/L (ref 94–109)
CHLORIDE BLD-SCNC: 107 MMOL/L (ref 94–109)
CHLORIDE BLD-SCNC: 108 MMOL/L (ref 94–109)
CHLORIDE BLD-SCNC: 108 MMOL/L (ref 94–109)
CHLORIDE BLD-SCNC: 109 MMOL/L (ref 94–109)
CHLORIDE BLD-SCNC: 111 MMOL/L (ref 94–109)
CHLORIDE BLD-SCNC: 111 MMOL/L (ref 94–109)
CHLORIDE BLD-SCNC: 112 MMOL/L (ref 94–109)
CHLORIDE BLD-SCNC: 113 MMOL/L (ref 94–109)
CHLORIDE BLD-SCNC: 117 MMOL/L (ref 94–109)
CHLORIDE BLD-SCNC: 97 MMOL/L (ref 94–109)
CHLORIDE BLD-SCNC: 98 MMOL/L (ref 94–109)
CHLORIDE BLD-SCNC: 99 MMOL/L (ref 94–109)
CO2 SERPL-SCNC: 28 MMOL/L (ref 20–32)
CO2 SERPL-SCNC: 29 MMOL/L (ref 20–32)
CO2 SERPL-SCNC: 29 MMOL/L (ref 20–32)
CO2 SERPL-SCNC: 30 MMOL/L (ref 20–32)
CO2 SERPL-SCNC: 30 MMOL/L (ref 20–32)
CO2 SERPL-SCNC: 31 MMOL/L (ref 20–32)
CO2 SERPL-SCNC: 32 MMOL/L (ref 20–32)
CO2 SERPL-SCNC: 33 MMOL/L (ref 20–32)
CO2 SERPL-SCNC: 34 MMOL/L (ref 20–32)
CO2 SERPL-SCNC: 34 MMOL/L (ref 20–32)
CO2 SERPL-SCNC: 35 MMOL/L (ref 20–32)
CO2 SERPL-SCNC: 37 MMOL/L (ref 20–32)
CO2 SERPL-SCNC: 37 MMOL/L (ref 20–32)
CO2 SERPL-SCNC: 38 MMOL/L (ref 20–32)
CO2 SERPL-SCNC: 38 MMOL/L (ref 20–32)
CO2 SERPL-SCNC: 39 MMOL/L (ref 20–32)
CO2 SERPL-SCNC: 39 MMOL/L (ref 20–32)
CO2 SERPL-SCNC: 41 MMOL/L (ref 20–32)
CO2 SERPL-SCNC: 42 MMOL/L (ref 20–32)
CO2 SERPL-SCNC: 42 MMOL/L (ref 20–32)
CO2 SERPL-SCNC: 43 MMOL/L (ref 20–32)
CODING SYSTEM: NORMAL
COLOR FLD: ABNORMAL
COLOR FLD: ABNORMAL
COLOR FLD: YELLOW
COLOR UR AUTO: ABNORMAL
CREAT SERPL-MCNC: 0.48 MG/DL (ref 0.52–1.04)
CREAT SERPL-MCNC: 0.52 MG/DL (ref 0.52–1.04)
CREAT SERPL-MCNC: 0.53 MG/DL (ref 0.52–1.04)
CREAT SERPL-MCNC: 0.53 MG/DL (ref 0.52–1.04)
CREAT SERPL-MCNC: 0.56 MG/DL (ref 0.52–1.04)
CREAT SERPL-MCNC: 0.57 MG/DL (ref 0.52–1.04)
CREAT SERPL-MCNC: 0.62 MG/DL (ref 0.52–1.04)
CREAT SERPL-MCNC: 0.64 MG/DL (ref 0.52–1.04)
CREAT SERPL-MCNC: 0.65 MG/DL (ref 0.52–1.04)
CREAT SERPL-MCNC: 0.68 MG/DL (ref 0.52–1.04)
CREAT SERPL-MCNC: 0.69 MG/DL (ref 0.52–1.04)
CREAT SERPL-MCNC: 0.69 MG/DL (ref 0.52–1.04)
CREAT SERPL-MCNC: 0.7 MG/DL (ref 0.52–1.04)
CREAT SERPL-MCNC: 0.71 MG/DL (ref 0.52–1.04)
CREAT SERPL-MCNC: 0.73 MG/DL (ref 0.52–1.04)
CREAT SERPL-MCNC: 0.74 MG/DL (ref 0.52–1.04)
CREAT SERPL-MCNC: 0.78 MG/DL (ref 0.52–1.04)
CREAT SERPL-MCNC: 0.8 MG/DL (ref 0.52–1.04)
CREAT SERPL-MCNC: 0.86 MG/DL (ref 0.52–1.04)
CREAT SERPL-MCNC: 1 MG/DL (ref 0.52–1.04)
CREAT SERPL-MCNC: 1.21 MG/DL (ref 0.52–1.04)
CREAT SERPL-MCNC: 1.45 MG/DL (ref 0.52–1.04)
CREAT SERPL-MCNC: 1.59 MG/DL (ref 0.52–1.04)
CREAT SERPL-MCNC: 1.84 MG/DL (ref 0.52–1.04)
CREAT SERPL-MCNC: 1.99 MG/DL (ref 0.52–1.04)
CREAT SERPL-MCNC: 2.03 MG/DL (ref 0.52–1.04)
CREAT SERPL-MCNC: 2.08 MG/DL (ref 0.52–1.04)
CREAT SERPL-MCNC: 2.08 MG/DL (ref 0.52–1.04)
CREAT UR-MCNC: 157 MG/DL
CROSSMATCH: NORMAL
CRP SERPL-MCNC: 67.5 MG/L (ref 0–8)
DIASTOLIC BLOOD PRESSURE - MUSE: NORMAL MMHG
EOSINOPHIL # BLD AUTO: 0.1 10E3/UL (ref 0–0.7)
EOSINOPHIL NFR BLD AUTO: 1 %
EOSINOPHIL NFR FLD MANUAL: 1 %
EOSINOPHIL NFR FLD MANUAL: 1 %
ERYTHROCYTE [DISTWIDTH] IN BLOOD BY AUTOMATED COUNT: 21.4 % (ref 10–15)
ERYTHROCYTE [DISTWIDTH] IN BLOOD BY AUTOMATED COUNT: 21.6 % (ref 10–15)
ERYTHROCYTE [DISTWIDTH] IN BLOOD BY AUTOMATED COUNT: 21.6 % (ref 10–15)
ERYTHROCYTE [DISTWIDTH] IN BLOOD BY AUTOMATED COUNT: 21.8 % (ref 10–15)
ERYTHROCYTE [DISTWIDTH] IN BLOOD BY AUTOMATED COUNT: 21.9 % (ref 10–15)
ERYTHROCYTE [DISTWIDTH] IN BLOOD BY AUTOMATED COUNT: 22.1 % (ref 10–15)
ERYTHROCYTE [DISTWIDTH] IN BLOOD BY AUTOMATED COUNT: 22.2 % (ref 10–15)
ERYTHROCYTE [DISTWIDTH] IN BLOOD BY AUTOMATED COUNT: 22.2 % (ref 10–15)
ERYTHROCYTE [DISTWIDTH] IN BLOOD BY AUTOMATED COUNT: 22.5 % (ref 10–15)
ERYTHROCYTE [DISTWIDTH] IN BLOOD BY AUTOMATED COUNT: 22.6 % (ref 10–15)
ERYTHROCYTE [DISTWIDTH] IN BLOOD BY AUTOMATED COUNT: 22.7 % (ref 10–15)
ERYTHROCYTE [DISTWIDTH] IN BLOOD BY AUTOMATED COUNT: 22.8 % (ref 10–15)
ERYTHROCYTE [DISTWIDTH] IN BLOOD BY AUTOMATED COUNT: 22.9 % (ref 10–15)
ERYTHROCYTE [DISTWIDTH] IN BLOOD BY AUTOMATED COUNT: 23.3 % (ref 10–15)
ERYTHROCYTE [DISTWIDTH] IN BLOOD BY AUTOMATED COUNT: 23.4 % (ref 10–15)
ERYTHROCYTE [DISTWIDTH] IN BLOOD BY AUTOMATED COUNT: 23.6 % (ref 10–15)
ERYTHROCYTE [DISTWIDTH] IN BLOOD BY AUTOMATED COUNT: 23.7 % (ref 10–15)
ERYTHROCYTE [DISTWIDTH] IN BLOOD BY AUTOMATED COUNT: 23.7 % (ref 10–15)
ERYTHROCYTE [DISTWIDTH] IN BLOOD BY AUTOMATED COUNT: 23.9 % (ref 10–15)
ERYTHROCYTE [DISTWIDTH] IN BLOOD BY AUTOMATED COUNT: 23.9 % (ref 10–15)
ERYTHROCYTE [DISTWIDTH] IN BLOOD BY AUTOMATED COUNT: 24.1 % (ref 10–15)
ERYTHROCYTE [DISTWIDTH] IN BLOOD BY AUTOMATED COUNT: 24.4 % (ref 10–15)
ERYTHROCYTE [DISTWIDTH] IN BLOOD BY AUTOMATED COUNT: 24.7 % (ref 10–15)
ERYTHROCYTE [DISTWIDTH] IN BLOOD BY AUTOMATED COUNT: 24.9 % (ref 10–15)
ERYTHROCYTE [DISTWIDTH] IN BLOOD BY AUTOMATED COUNT: 25 % (ref 10–15)
FRACT EXCRET NA UR+SERPL-RTO: 0.1 %
GFR SERPL CREATININE-BSD FRML MDRD: 27 ML/MIN/1.73M2
GFR SERPL CREATININE-BSD FRML MDRD: 27 ML/MIN/1.73M2
GFR SERPL CREATININE-BSD FRML MDRD: 28 ML/MIN/1.73M2
GFR SERPL CREATININE-BSD FRML MDRD: 28 ML/MIN/1.73M2
GFR SERPL CREATININE-BSD FRML MDRD: 31 ML/MIN/1.73M2
GFR SERPL CREATININE-BSD FRML MDRD: 37 ML/MIN/1.73M2
GFR SERPL CREATININE-BSD FRML MDRD: 42 ML/MIN/1.73M2
GFR SERPL CREATININE-BSD FRML MDRD: 52 ML/MIN/1.73M2
GFR SERPL CREATININE-BSD FRML MDRD: 65 ML/MIN/1.73M2
GFR SERPL CREATININE-BSD FRML MDRD: 78 ML/MIN/1.73M2
GFR SERPL CREATININE-BSD FRML MDRD: 85 ML/MIN/1.73M2
GFR SERPL CREATININE-BSD FRML MDRD: 88 ML/MIN/1.73M2
GFR SERPL CREATININE-BSD FRML MDRD: >90 ML/MIN/1.73M2
GLUCOSE BLD-MCNC: 117 MG/DL (ref 70–99)
GLUCOSE BLD-MCNC: 131 MG/DL (ref 70–99)
GLUCOSE BLD-MCNC: 146 MG/DL (ref 70–99)
GLUCOSE BLD-MCNC: 147 MG/DL (ref 70–99)
GLUCOSE BLD-MCNC: 149 MG/DL (ref 70–99)
GLUCOSE BLD-MCNC: 149 MG/DL (ref 70–99)
GLUCOSE BLD-MCNC: 150 MG/DL (ref 70–99)
GLUCOSE BLD-MCNC: 150 MG/DL (ref 70–99)
GLUCOSE BLD-MCNC: 154 MG/DL (ref 70–99)
GLUCOSE BLD-MCNC: 160 MG/DL (ref 70–99)
GLUCOSE BLD-MCNC: 162 MG/DL (ref 70–99)
GLUCOSE BLD-MCNC: 164 MG/DL (ref 70–99)
GLUCOSE BLD-MCNC: 164 MG/DL (ref 70–99)
GLUCOSE BLD-MCNC: 165 MG/DL (ref 70–99)
GLUCOSE BLD-MCNC: 171 MG/DL (ref 70–99)
GLUCOSE BLD-MCNC: 171 MG/DL (ref 70–99)
GLUCOSE BLD-MCNC: 175 MG/DL (ref 70–99)
GLUCOSE BLD-MCNC: 175 MG/DL (ref 70–99)
GLUCOSE BLD-MCNC: 176 MG/DL (ref 70–99)
GLUCOSE BLD-MCNC: 177 MG/DL (ref 70–99)
GLUCOSE BLD-MCNC: 179 MG/DL (ref 70–99)
GLUCOSE BLD-MCNC: 180 MG/DL (ref 70–99)
GLUCOSE BLD-MCNC: 183 MG/DL (ref 70–99)
GLUCOSE BLD-MCNC: 183 MG/DL (ref 70–99)
GLUCOSE BLD-MCNC: 187 MG/DL (ref 70–99)
GLUCOSE BLD-MCNC: 191 MG/DL (ref 70–99)
GLUCOSE BLD-MCNC: 193 MG/DL (ref 70–99)
GLUCOSE BLD-MCNC: 201 MG/DL (ref 70–99)
GLUCOSE BLDC GLUCOMTR-MCNC: 103 MG/DL (ref 70–99)
GLUCOSE BLDC GLUCOMTR-MCNC: 106 MG/DL (ref 70–99)
GLUCOSE BLDC GLUCOMTR-MCNC: 109 MG/DL (ref 70–99)
GLUCOSE BLDC GLUCOMTR-MCNC: 109 MG/DL (ref 70–99)
GLUCOSE BLDC GLUCOMTR-MCNC: 113 MG/DL (ref 70–99)
GLUCOSE BLDC GLUCOMTR-MCNC: 113 MG/DL (ref 70–99)
GLUCOSE BLDC GLUCOMTR-MCNC: 117 MG/DL (ref 70–99)
GLUCOSE BLDC GLUCOMTR-MCNC: 117 MG/DL (ref 70–99)
GLUCOSE BLDC GLUCOMTR-MCNC: 120 MG/DL (ref 70–99)
GLUCOSE BLDC GLUCOMTR-MCNC: 121 MG/DL (ref 70–99)
GLUCOSE BLDC GLUCOMTR-MCNC: 122 MG/DL (ref 70–99)
GLUCOSE BLDC GLUCOMTR-MCNC: 123 MG/DL (ref 70–99)
GLUCOSE BLDC GLUCOMTR-MCNC: 124 MG/DL (ref 70–99)
GLUCOSE BLDC GLUCOMTR-MCNC: 125 MG/DL (ref 70–99)
GLUCOSE BLDC GLUCOMTR-MCNC: 126 MG/DL (ref 70–99)
GLUCOSE BLDC GLUCOMTR-MCNC: 127 MG/DL (ref 70–99)
GLUCOSE BLDC GLUCOMTR-MCNC: 128 MG/DL (ref 70–99)
GLUCOSE BLDC GLUCOMTR-MCNC: 128 MG/DL (ref 70–99)
GLUCOSE BLDC GLUCOMTR-MCNC: 131 MG/DL (ref 70–99)
GLUCOSE BLDC GLUCOMTR-MCNC: 133 MG/DL (ref 70–99)
GLUCOSE BLDC GLUCOMTR-MCNC: 134 MG/DL (ref 70–99)
GLUCOSE BLDC GLUCOMTR-MCNC: 135 MG/DL (ref 70–99)
GLUCOSE BLDC GLUCOMTR-MCNC: 136 MG/DL (ref 70–99)
GLUCOSE BLDC GLUCOMTR-MCNC: 136 MG/DL (ref 70–99)
GLUCOSE BLDC GLUCOMTR-MCNC: 137 MG/DL (ref 70–99)
GLUCOSE BLDC GLUCOMTR-MCNC: 139 MG/DL (ref 70–99)
GLUCOSE BLDC GLUCOMTR-MCNC: 141 MG/DL (ref 70–99)
GLUCOSE BLDC GLUCOMTR-MCNC: 142 MG/DL (ref 70–99)
GLUCOSE BLDC GLUCOMTR-MCNC: 143 MG/DL (ref 70–99)
GLUCOSE BLDC GLUCOMTR-MCNC: 144 MG/DL (ref 70–99)
GLUCOSE BLDC GLUCOMTR-MCNC: 145 MG/DL (ref 70–99)
GLUCOSE BLDC GLUCOMTR-MCNC: 145 MG/DL (ref 70–99)
GLUCOSE BLDC GLUCOMTR-MCNC: 146 MG/DL (ref 70–99)
GLUCOSE BLDC GLUCOMTR-MCNC: 147 MG/DL (ref 70–99)
GLUCOSE BLDC GLUCOMTR-MCNC: 148 MG/DL (ref 70–99)
GLUCOSE BLDC GLUCOMTR-MCNC: 148 MG/DL (ref 70–99)
GLUCOSE BLDC GLUCOMTR-MCNC: 149 MG/DL (ref 70–99)
GLUCOSE BLDC GLUCOMTR-MCNC: 149 MG/DL (ref 70–99)
GLUCOSE BLDC GLUCOMTR-MCNC: 150 MG/DL (ref 70–99)
GLUCOSE BLDC GLUCOMTR-MCNC: 151 MG/DL (ref 70–99)
GLUCOSE BLDC GLUCOMTR-MCNC: 151 MG/DL (ref 70–99)
GLUCOSE BLDC GLUCOMTR-MCNC: 152 MG/DL (ref 70–99)
GLUCOSE BLDC GLUCOMTR-MCNC: 153 MG/DL (ref 70–99)
GLUCOSE BLDC GLUCOMTR-MCNC: 153 MG/DL (ref 70–99)
GLUCOSE BLDC GLUCOMTR-MCNC: 154 MG/DL (ref 70–99)
GLUCOSE BLDC GLUCOMTR-MCNC: 155 MG/DL (ref 70–99)
GLUCOSE BLDC GLUCOMTR-MCNC: 156 MG/DL (ref 70–99)
GLUCOSE BLDC GLUCOMTR-MCNC: 156 MG/DL (ref 70–99)
GLUCOSE BLDC GLUCOMTR-MCNC: 157 MG/DL (ref 70–99)
GLUCOSE BLDC GLUCOMTR-MCNC: 158 MG/DL (ref 70–99)
GLUCOSE BLDC GLUCOMTR-MCNC: 159 MG/DL (ref 70–99)
GLUCOSE BLDC GLUCOMTR-MCNC: 160 MG/DL (ref 70–99)
GLUCOSE BLDC GLUCOMTR-MCNC: 161 MG/DL (ref 70–99)
GLUCOSE BLDC GLUCOMTR-MCNC: 162 MG/DL (ref 70–99)
GLUCOSE BLDC GLUCOMTR-MCNC: 163 MG/DL (ref 70–99)
GLUCOSE BLDC GLUCOMTR-MCNC: 164 MG/DL (ref 70–99)
GLUCOSE BLDC GLUCOMTR-MCNC: 166 MG/DL (ref 70–99)
GLUCOSE BLDC GLUCOMTR-MCNC: 166 MG/DL (ref 70–99)
GLUCOSE BLDC GLUCOMTR-MCNC: 167 MG/DL (ref 70–99)
GLUCOSE BLDC GLUCOMTR-MCNC: 167 MG/DL (ref 70–99)
GLUCOSE BLDC GLUCOMTR-MCNC: 168 MG/DL (ref 70–99)
GLUCOSE BLDC GLUCOMTR-MCNC: 168 MG/DL (ref 70–99)
GLUCOSE BLDC GLUCOMTR-MCNC: 169 MG/DL (ref 70–99)
GLUCOSE BLDC GLUCOMTR-MCNC: 171 MG/DL (ref 70–99)
GLUCOSE BLDC GLUCOMTR-MCNC: 171 MG/DL (ref 70–99)
GLUCOSE BLDC GLUCOMTR-MCNC: 172 MG/DL (ref 70–99)
GLUCOSE BLDC GLUCOMTR-MCNC: 173 MG/DL (ref 70–99)
GLUCOSE BLDC GLUCOMTR-MCNC: 173 MG/DL (ref 70–99)
GLUCOSE BLDC GLUCOMTR-MCNC: 177 MG/DL (ref 70–99)
GLUCOSE BLDC GLUCOMTR-MCNC: 179 MG/DL (ref 70–99)
GLUCOSE BLDC GLUCOMTR-MCNC: 180 MG/DL (ref 70–99)
GLUCOSE BLDC GLUCOMTR-MCNC: 198 MG/DL (ref 70–99)
GLUCOSE FLD-MCNC: 160 MG/DL
GLUCOSE UR STRIP-MCNC: NEGATIVE MG/DL
GRAM STAIN RESULT: NORMAL
HBV SURFACE AB SERPL IA-ACNC: 0.75 M[IU]/ML
HBV SURFACE AG SERPL QL IA: NONREACTIVE
HCO3 BLD-SCNC: 31 MMOL/L (ref 21–28)
HCO3 BLD-SCNC: 31 MMOL/L (ref 21–28)
HCO3 BLD-SCNC: 33 MMOL/L (ref 21–28)
HCO3 BLD-SCNC: 34 MMOL/L (ref 21–28)
HCO3 BLD-SCNC: 35 MMOL/L (ref 21–28)
HCO3 BLD-SCNC: 37 MMOL/L (ref 21–28)
HCO3 BLD-SCNC: 37 MMOL/L (ref 21–28)
HCO3 BLD-SCNC: 38 MMOL/L (ref 21–28)
HCO3 BLD-SCNC: 40 MMOL/L (ref 21–28)
HCO3 BLD-SCNC: 41 MMOL/L (ref 21–28)
HCO3 BLD-SCNC: 42 MMOL/L (ref 21–28)
HCO3 BLD-SCNC: 42 MMOL/L (ref 21–28)
HCO3 BLD-SCNC: 44 MMOL/L (ref 21–28)
HCO3 BLD-SCNC: 45 MMOL/L (ref 21–28)
HCO3 BLD-SCNC: 46 MMOL/L (ref 21–28)
HCO3 BLD-SCNC: 46 MMOL/L (ref 21–28)
HCO3 BLDV-SCNC: 41 MMOL/L (ref 21–28)
HCO3 BLDV-SCNC: 44 MMOL/L (ref 21–28)
HCT VFR BLD AUTO: 24 % (ref 35–47)
HCT VFR BLD AUTO: 24.1 % (ref 35–47)
HCT VFR BLD AUTO: 24.4 % (ref 35–47)
HCT VFR BLD AUTO: 24.5 % (ref 35–47)
HCT VFR BLD AUTO: 24.5 % (ref 35–47)
HCT VFR BLD AUTO: 25 % (ref 35–47)
HCT VFR BLD AUTO: 25.1 % (ref 35–47)
HCT VFR BLD AUTO: 25.2 % (ref 35–47)
HCT VFR BLD AUTO: 25.5 % (ref 35–47)
HCT VFR BLD AUTO: 25.6 % (ref 35–47)
HCT VFR BLD AUTO: 25.7 % (ref 35–47)
HCT VFR BLD AUTO: 25.8 % (ref 35–47)
HCT VFR BLD AUTO: 26.1 % (ref 35–47)
HCT VFR BLD AUTO: 26.5 % (ref 35–47)
HCT VFR BLD AUTO: 27.2 % (ref 35–47)
HCT VFR BLD AUTO: 31.5 % (ref 35–47)
HCT VFR BLD AUTO: 36.1 % (ref 35–47)
HCT VFR BLD AUTO: 39 % (ref 35–47)
HCT VFR BLD AUTO: 39.7 % (ref 35–47)
HCT VFR BLD AUTO: 40.1 % (ref 35–47)
HCT VFR BLD AUTO: 41.2 % (ref 35–47)
HCT VFR BLD AUTO: 41.2 % (ref 35–47)
HCT VFR BLD AUTO: 41.6 % (ref 35–47)
HCT VFR BLD AUTO: 42.6 % (ref 35–47)
HCT VFR BLD AUTO: 44.5 % (ref 35–47)
HGB BLD-MCNC: 10.4 G/DL (ref 11.7–15.7)
HGB BLD-MCNC: 10.7 G/DL (ref 11.7–15.7)
HGB BLD-MCNC: 10.7 G/DL (ref 11.7–15.7)
HGB BLD-MCNC: 10.9 G/DL (ref 11.7–15.7)
HGB BLD-MCNC: 11.1 G/DL (ref 11.7–15.7)
HGB BLD-MCNC: 11.3 G/DL (ref 11.7–15.7)
HGB BLD-MCNC: 11.5 G/DL (ref 11.7–15.7)
HGB BLD-MCNC: 11.8 G/DL (ref 11.7–15.7)
HGB BLD-MCNC: 6.6 G/DL (ref 11.7–15.7)
HGB BLD-MCNC: 6.7 G/DL (ref 11.7–15.7)
HGB BLD-MCNC: 6.8 G/DL (ref 11.7–15.7)
HGB BLD-MCNC: 6.9 G/DL (ref 11.7–15.7)
HGB BLD-MCNC: 7 G/DL (ref 11.7–15.7)
HGB BLD-MCNC: 7.1 G/DL (ref 11.7–15.7)
HGB BLD-MCNC: 7.2 G/DL (ref 11.7–15.7)
HGB BLD-MCNC: 7.3 G/DL (ref 11.7–15.7)
HGB BLD-MCNC: 7.3 G/DL (ref 11.7–15.7)
HGB BLD-MCNC: 7.4 G/DL (ref 11.7–15.7)
HGB BLD-MCNC: 7.5 G/DL (ref 11.7–15.7)
HGB BLD-MCNC: 7.6 G/DL (ref 11.7–15.7)
HGB BLD-MCNC: 7.7 G/DL (ref 11.7–15.7)
HGB BLD-MCNC: 7.8 G/DL (ref 11.7–15.7)
HGB BLD-MCNC: 7.8 G/DL (ref 11.7–15.7)
HGB BLD-MCNC: 7.9 G/DL (ref 11.7–15.7)
HGB BLD-MCNC: 8 G/DL (ref 11.7–15.7)
HGB BLD-MCNC: 8 G/DL (ref 11.7–15.7)
HGB BLD-MCNC: 8.1 G/DL (ref 11.7–15.7)
HGB BLD-MCNC: 8.1 G/DL (ref 11.7–15.7)
HGB BLD-MCNC: 8.2 G/DL (ref 11.7–15.7)
HGB BLD-MCNC: 8.4 G/DL (ref 11.7–15.7)
HGB BLD-MCNC: 9.3 G/DL (ref 11.7–15.7)
HGB UR QL STRIP: ABNORMAL
IMM GRANULOCYTES # BLD: 0.1 10E3/UL
IMM GRANULOCYTES NFR BLD: 1 %
INR PPP: 1.37 (ref 0.85–1.15)
INTERPRETATION ECG - MUSE: NORMAL
ISSUE DATE AND TIME: NORMAL
KETONES UR STRIP-MCNC: NEGATIVE MG/DL
KOH PREPARATION: NORMAL
KOH PREPARATION: NORMAL
LACTATE SERPL-SCNC: 0.9 MMOL/L (ref 0.7–2)
LACTATE SERPL-SCNC: 1.2 MMOL/L (ref 0.7–2)
LDH FLD L TO P-CCNC: 424 U/L
LDH FLD L TO P-CCNC: 79 U/L
LDH SERPL L TO P-CCNC: 223 U/L (ref 81–234)
LDH SERPL L TO P-CCNC: 577 U/L (ref 81–234)
LEUKOCYTE ESTERASE UR QL STRIP: ABNORMAL
LVEF ECHO: NORMAL
LVEF ECHO: NORMAL
LYMPHOCYTES # BLD AUTO: 0.7 10E3/UL (ref 0.8–5.3)
LYMPHOCYTES NFR BLD AUTO: 5 %
LYMPHOCYTES NFR FLD MANUAL: 29 %
LYMPHOCYTES NFR FLD MANUAL: 6 %
LYMPHOCYTES NFR FLD MANUAL: 9 %
MAGNESIUM SERPL-MCNC: 1.7 MG/DL (ref 1.6–2.3)
MAGNESIUM SERPL-MCNC: 1.8 MG/DL (ref 1.6–2.3)
MAGNESIUM SERPL-MCNC: 1.9 MG/DL (ref 1.6–2.3)
MAGNESIUM SERPL-MCNC: 2 MG/DL (ref 1.6–2.3)
MAGNESIUM SERPL-MCNC: 2.2 MG/DL (ref 1.6–2.3)
MAGNESIUM SERPL-MCNC: 2.2 MG/DL (ref 1.6–2.3)
MAGNESIUM SERPL-MCNC: 2.3 MG/DL (ref 1.6–2.3)
MAGNESIUM SERPL-MCNC: 2.4 MG/DL (ref 1.6–2.3)
MAGNESIUM SERPL-MCNC: 2.4 MG/DL (ref 1.6–2.3)
MAGNESIUM SERPL-MCNC: 2.6 MG/DL (ref 1.6–2.3)
MCH RBC QN AUTO: 21.4 PG (ref 26.5–33)
MCH RBC QN AUTO: 21.6 PG (ref 26.5–33)
MCH RBC QN AUTO: 21.6 PG (ref 26.5–33)
MCH RBC QN AUTO: 21.8 PG (ref 26.5–33)
MCH RBC QN AUTO: 21.9 PG (ref 26.5–33)
MCH RBC QN AUTO: 22.1 PG (ref 26.5–33)
MCH RBC QN AUTO: 22.1 PG (ref 26.5–33)
MCH RBC QN AUTO: 22.3 PG (ref 26.5–33)
MCH RBC QN AUTO: 24.2 PG (ref 26.5–33)
MCH RBC QN AUTO: 24.3 PG (ref 26.5–33)
MCH RBC QN AUTO: 25.6 PG (ref 26.5–33)
MCH RBC QN AUTO: 25.7 PG (ref 26.5–33)
MCH RBC QN AUTO: 25.7 PG (ref 26.5–33)
MCH RBC QN AUTO: 25.8 PG (ref 26.5–33)
MCH RBC QN AUTO: 26 PG (ref 26.5–33)
MCH RBC QN AUTO: 26.1 PG (ref 26.5–33)
MCH RBC QN AUTO: 26.2 PG (ref 26.5–33)
MCH RBC QN AUTO: 26.3 PG (ref 26.5–33)
MCH RBC QN AUTO: 26.5 PG (ref 26.5–33)
MCH RBC QN AUTO: 26.6 PG (ref 26.5–33)
MCH RBC QN AUTO: 27 PG (ref 26.5–33)
MCH RBC QN AUTO: 27.6 PG (ref 26.5–33)
MCHC RBC AUTO-ENTMCNC: 25.8 G/DL (ref 31.5–36.5)
MCHC RBC AUTO-ENTMCNC: 26.5 G/DL (ref 31.5–36.5)
MCHC RBC AUTO-ENTMCNC: 26.5 G/DL (ref 31.5–36.5)
MCHC RBC AUTO-ENTMCNC: 26.7 G/DL (ref 31.5–36.5)
MCHC RBC AUTO-ENTMCNC: 26.9 G/DL (ref 31.5–36.5)
MCHC RBC AUTO-ENTMCNC: 27 G/DL (ref 31.5–36.5)
MCHC RBC AUTO-ENTMCNC: 27 G/DL (ref 31.5–36.5)
MCHC RBC AUTO-ENTMCNC: 27.2 G/DL (ref 31.5–36.5)
MCHC RBC AUTO-ENTMCNC: 28.3 G/DL (ref 31.5–36.5)
MCHC RBC AUTO-ENTMCNC: 28.6 G/DL (ref 31.5–36.5)
MCHC RBC AUTO-ENTMCNC: 28.7 G/DL (ref 31.5–36.5)
MCHC RBC AUTO-ENTMCNC: 28.8 G/DL (ref 31.5–36.5)
MCHC RBC AUTO-ENTMCNC: 28.8 G/DL (ref 31.5–36.5)
MCHC RBC AUTO-ENTMCNC: 29 G/DL (ref 31.5–36.5)
MCHC RBC AUTO-ENTMCNC: 29.1 G/DL (ref 31.5–36.5)
MCHC RBC AUTO-ENTMCNC: 29.2 G/DL (ref 31.5–36.5)
MCHC RBC AUTO-ENTMCNC: 29.4 G/DL (ref 31.5–36.5)
MCHC RBC AUTO-ENTMCNC: 29.7 G/DL (ref 31.5–36.5)
MCHC RBC AUTO-ENTMCNC: 29.8 G/DL (ref 31.5–36.5)
MCV RBC AUTO: 78 FL (ref 78–100)
MCV RBC AUTO: 80 FL (ref 78–100)
MCV RBC AUTO: 80 FL (ref 78–100)
MCV RBC AUTO: 81 FL (ref 78–100)
MCV RBC AUTO: 82 FL (ref 78–100)
MCV RBC AUTO: 82 FL (ref 78–100)
MCV RBC AUTO: 83 FL (ref 78–100)
MCV RBC AUTO: 85 FL (ref 78–100)
MCV RBC AUTO: 87 FL (ref 78–100)
MCV RBC AUTO: 88 FL (ref 78–100)
MCV RBC AUTO: 90 FL (ref 78–100)
MCV RBC AUTO: 90 FL (ref 78–100)
MCV RBC AUTO: 91 FL (ref 78–100)
MCV RBC AUTO: 92 FL (ref 78–100)
MCV RBC AUTO: 93 FL (ref 78–100)
MCV RBC AUTO: 93 FL (ref 78–100)
MONOCYTES # BLD AUTO: 0.7 10E3/UL (ref 0–1.3)
MONOCYTES NFR BLD AUTO: 4 %
MONOS+MACROS NFR FLD MANUAL: 2 %
MONOS+MACROS NFR FLD MANUAL: 9 %
MONOS+MACROS NFR FLD MANUAL: 9 %
MRSA DNA SPEC QL NAA+PROBE: POSITIVE
NEUTROPHILS # BLD AUTO: 13.6 10E3/UL (ref 1.6–8.3)
NEUTROPHILS NFR BLD AUTO: 88 %
NEUTS BAND NFR FLD MANUAL: 24 %
NEUTS BAND NFR FLD MANUAL: 60 %
NEUTS BAND NFR FLD MANUAL: 88 %
NITRATE UR QL: NEGATIVE
NRBC # BLD AUTO: 0 10E3/UL
NRBC BLD AUTO-RTO: 0 /100
NT-PROBNP SERPL-MCNC: 2928 PG/ML (ref 0–900)
NT-PROBNP SERPL-MCNC: 3857 PG/ML (ref 0–900)
O2/TOTAL GAS SETTING VFR VENT: 100 %
O2/TOTAL GAS SETTING VFR VENT: 40 %
O2/TOTAL GAS SETTING VFR VENT: 60 %
O2/TOTAL GAS SETTING VFR VENT: 70 %
O2/TOTAL GAS SETTING VFR VENT: 75 %
O2/TOTAL GAS SETTING VFR VENT: 80 %
O2/TOTAL GAS SETTING VFR VENT: 90 %
OBSERVATION IMP: ABNORMAL
OXYHGB MFR BLD: 87 % (ref 92–100)
OXYHGB MFR BLD: 92 % (ref 92–100)
OXYHGB MFR BLD: 93 % (ref 92–100)
OXYHGB MFR BLD: 95 % (ref 92–100)
OXYHGB MFR BLD: 98 % (ref 92–100)
OXYHGB MFR BLDV: 68 % (ref 70–75)
P AXIS - MUSE: 25 DEGREES
P AXIS - MUSE: 39 DEGREES
P AXIS - MUSE: 48 DEGREES
P AXIS - MUSE: 60 DEGREES
PATH REPORT.COMMENTS IMP SPEC: NORMAL
PATH REPORT.COMMENTS IMP SPEC: NORMAL
PATH REPORT.FINAL DX SPEC: NORMAL
PATH REPORT.FINAL DX SPEC: NORMAL
PATH REPORT.GROSS SPEC: NORMAL
PATH REPORT.GROSS SPEC: NORMAL
PATH REPORT.MICROSCOPIC SPEC OTHER STN: NORMAL
PATH REPORT.MICROSCOPIC SPEC OTHER STN: NORMAL
PATH REPORT.RELEVANT HX SPEC: NORMAL
PATH REPORT.RELEVANT HX SPEC: NORMAL
PCO2 BLD: 53 MM HG (ref 35–45)
PCO2 BLD: 54 MM HG (ref 35–45)
PCO2 BLD: 56 MM HG (ref 35–45)
PCO2 BLD: 57 MM HG (ref 35–45)
PCO2 BLD: 57 MM HG (ref 35–45)
PCO2 BLD: 60 MM HG (ref 35–45)
PCO2 BLD: 62 MM HG (ref 35–45)
PCO2 BLD: 62 MM HG (ref 35–45)
PCO2 BLD: 63 MM HG (ref 35–45)
PCO2 BLD: 63 MM HG (ref 35–45)
PCO2 BLD: 64 MM HG (ref 35–45)
PCO2 BLD: 64 MM HG (ref 35–45)
PCO2 BLD: 65 MM HG (ref 35–45)
PCO2 BLD: 69 MM HG (ref 35–45)
PCO2 BLD: 70 MM HG (ref 35–45)
PCO2 BLD: 71 MM HG (ref 35–45)
PCO2 BLD: 73 MM HG (ref 35–45)
PCO2 BLD: 78 MM HG (ref 35–45)
PCO2 BLD: 82 MM HG (ref 35–45)
PCO2 BLD: 89 MM HG (ref 35–45)
PCO2 BLD: 94 MM HG (ref 35–45)
PCO2 BLDV: 66 MM HG (ref 40–50)
PCO2 BLDV: 78 MM HG (ref 40–50)
PH BLD: 7.29 [PH] (ref 7.35–7.45)
PH BLD: 7.3 [PH] (ref 7.35–7.45)
PH BLD: 7.32 [PH] (ref 7.35–7.45)
PH BLD: 7.33 [PH] (ref 7.35–7.45)
PH BLD: 7.34 [PH] (ref 7.35–7.45)
PH BLD: 7.35 [PH] (ref 7.35–7.45)
PH BLD: 7.37 [PH] (ref 7.35–7.45)
PH BLD: 7.38 [PH] (ref 7.35–7.45)
PH BLD: 7.39 [PH] (ref 7.35–7.45)
PH BLD: 7.4 [PH] (ref 7.35–7.45)
PH BLD: 7.47 [PH] (ref 7.35–7.45)
PH BLD: 7.48 [PH] (ref 7.35–7.45)
PH BLD: 7.49 [PH] (ref 7.35–7.45)
PH BLD: 7.49 [PH] (ref 7.35–7.45)
PH BLDV: 7.36 [PH] (ref 7.32–7.43)
PH BLDV: 7.4 [PH] (ref 7.32–7.43)
PH UR STRIP: 5.5 [PH] (ref 5–7)
PHOSPHATE SERPL-MCNC: 2.3 MG/DL (ref 2.5–4.5)
PHOSPHATE SERPL-MCNC: 2.6 MG/DL (ref 2.5–4.5)
PHOSPHATE SERPL-MCNC: 2.8 MG/DL (ref 2.5–4.5)
PHOSPHATE SERPL-MCNC: 2.9 MG/DL (ref 2.5–4.5)
PHOSPHATE SERPL-MCNC: 3.2 MG/DL (ref 2.5–4.5)
PHOSPHATE SERPL-MCNC: 3.4 MG/DL (ref 2.5–4.5)
PHOSPHATE SERPL-MCNC: 3.4 MG/DL (ref 2.5–4.5)
PHOSPHATE SERPL-MCNC: 3.7 MG/DL (ref 2.5–4.5)
PHOSPHATE SERPL-MCNC: 3.8 MG/DL (ref 2.5–4.5)
PHOSPHATE SERPL-MCNC: 4.8 MG/DL (ref 2.5–4.5)
PHOSPHATE SERPL-MCNC: 4.9 MG/DL (ref 2.5–4.5)
PHOSPHATE SERPL-MCNC: 6.8 MG/DL (ref 2.5–4.5)
PHOSPHATE SERPL-MCNC: 6.9 MG/DL (ref 2.5–4.5)
PHOSPHATE SERPL-MCNC: 7.3 MG/DL (ref 2.5–4.5)
PLATELET # BLD AUTO: 230 10E3/UL (ref 150–450)
PLATELET # BLD AUTO: 255 10E3/UL (ref 150–450)
PLATELET # BLD AUTO: 255 10E3/UL (ref 150–450)
PLATELET # BLD AUTO: 268 10E3/UL (ref 150–450)
PLATELET # BLD AUTO: 289 10E3/UL (ref 150–450)
PLATELET # BLD AUTO: 295 10E3/UL (ref 150–450)
PLATELET # BLD AUTO: 298 10E3/UL (ref 150–450)
PLATELET # BLD AUTO: 299 10E3/UL (ref 150–450)
PLATELET # BLD AUTO: 299 10E3/UL (ref 150–450)
PLATELET # BLD AUTO: 302 10E3/UL (ref 150–450)
PLATELET # BLD AUTO: 309 10E3/UL (ref 150–450)
PLATELET # BLD AUTO: 311 10E3/UL (ref 150–450)
PLATELET # BLD AUTO: 316 10E3/UL (ref 150–450)
PLATELET # BLD AUTO: 320 10E3/UL (ref 150–450)
PLATELET # BLD AUTO: 324 10E3/UL (ref 150–450)
PLATELET # BLD AUTO: 329 10E3/UL (ref 150–450)
PLATELET # BLD AUTO: 332 10E3/UL (ref 150–450)
PLATELET # BLD AUTO: 333 10E3/UL (ref 150–450)
PLATELET # BLD AUTO: 337 10E3/UL (ref 150–450)
PLATELET # BLD AUTO: 347 10E3/UL (ref 150–450)
PLATELET # BLD AUTO: 354 10E3/UL (ref 150–450)
PLATELET # BLD AUTO: 355 10E3/UL (ref 150–450)
PLATELET # BLD AUTO: 356 10E3/UL (ref 150–450)
PLATELET # BLD AUTO: 357 10E3/UL (ref 150–450)
PLATELET # BLD AUTO: 362 10E3/UL (ref 150–450)
PO2 BLD: 108 MM HG (ref 80–105)
PO2 BLD: 114 MM HG (ref 80–105)
PO2 BLD: 116 MM HG (ref 80–105)
PO2 BLD: 135 MM HG (ref 80–105)
PO2 BLD: 139 MM HG (ref 80–105)
PO2 BLD: 139 MM HG (ref 80–105)
PO2 BLD: 166 MM HG (ref 80–105)
PO2 BLD: 203 MM HG (ref 80–105)
PO2 BLD: 45 MM HG (ref 80–105)
PO2 BLD: 51 MM HG (ref 80–105)
PO2 BLD: 51 MM HG (ref 80–105)
PO2 BLD: 59 MM HG (ref 80–105)
PO2 BLD: 62 MM HG (ref 80–105)
PO2 BLD: 65 MM HG (ref 80–105)
PO2 BLD: 70 MM HG (ref 80–105)
PO2 BLD: 70 MM HG (ref 80–105)
PO2 BLD: 72 MM HG (ref 80–105)
PO2 BLD: 73 MM HG (ref 80–105)
PO2 BLD: 73 MM HG (ref 80–105)
PO2 BLD: 81 MM HG (ref 80–105)
PO2 BLD: 87 MM HG (ref 80–105)
PO2 BLD: 91 MM HG (ref 80–105)
PO2 BLD: 93 MM HG (ref 80–105)
PO2 BLDV: 39 MM HG (ref 25–47)
PO2 BLDV: 59 MM HG (ref 25–47)
POTASSIUM BLD-SCNC: 2.8 MMOL/L (ref 3.4–5.3)
POTASSIUM BLD-SCNC: 3 MMOL/L (ref 3.4–5.3)
POTASSIUM BLD-SCNC: 3.1 MMOL/L (ref 3.4–5.3)
POTASSIUM BLD-SCNC: 3.2 MMOL/L (ref 3.4–5.3)
POTASSIUM BLD-SCNC: 3.3 MMOL/L (ref 3.4–5.3)
POTASSIUM BLD-SCNC: 3.4 MMOL/L (ref 3.4–5.3)
POTASSIUM BLD-SCNC: 3.5 MMOL/L (ref 3.4–5.3)
POTASSIUM BLD-SCNC: 3.6 MMOL/L (ref 3.4–5.3)
POTASSIUM BLD-SCNC: 3.6 MMOL/L (ref 3.4–5.3)
POTASSIUM BLD-SCNC: 3.7 MMOL/L (ref 3.4–5.3)
POTASSIUM BLD-SCNC: 3.8 MMOL/L (ref 3.4–5.3)
POTASSIUM BLD-SCNC: 3.8 MMOL/L (ref 3.4–5.3)
POTASSIUM BLD-SCNC: 3.9 MMOL/L (ref 3.4–5.3)
POTASSIUM BLD-SCNC: 3.9 MMOL/L (ref 3.4–5.3)
POTASSIUM BLD-SCNC: 4.2 MMOL/L (ref 3.4–5.3)
POTASSIUM BLD-SCNC: 4.2 MMOL/L (ref 3.4–5.3)
POTASSIUM BLD-SCNC: 4.4 MMOL/L (ref 3.4–5.3)
POTASSIUM BLD-SCNC: 4.4 MMOL/L (ref 3.4–5.3)
POTASSIUM BLD-SCNC: 4.6 MMOL/L (ref 3.4–5.3)
POTASSIUM BLD-SCNC: 4.7 MMOL/L (ref 3.4–5.3)
POTASSIUM BLD-SCNC: 4.7 MMOL/L (ref 3.4–5.3)
POTASSIUM BLD-SCNC: 4.8 MMOL/L (ref 3.4–5.3)
POTASSIUM BLD-SCNC: 5.2 MMOL/L (ref 3.4–5.3)
POTASSIUM BLD-SCNC: 6 MMOL/L (ref 3.4–5.3)
PR INTERVAL - MUSE: 160 MS
PR INTERVAL - MUSE: 200 MS
PR INTERVAL - MUSE: 216 MS
PR INTERVAL - MUSE: 230 MS
PROCALCITONIN SERPL-MCNC: 0.05 NG/ML
PROCALCITONIN SERPL-MCNC: 0.07 NG/ML
PROCALCITONIN SERPL-MCNC: 0.1 NG/ML
PROCALCITONIN SERPL-MCNC: 0.57 NG/ML
PROCALCITONIN SERPL-MCNC: <0.05 NG/ML
PROT FLD-MCNC: 1.2 G/DL
PROT FLD-MCNC: 5.2 G/DL
PROT SERPL-MCNC: 7.1 G/DL (ref 6.8–8.8)
PROT SERPL-MCNC: 7.3 G/DL (ref 6.8–8.8)
PROT SERPL-MCNC: 7.8 G/DL (ref 6.8–8.8)
PROT SERPL-MCNC: 7.8 G/DL (ref 6.8–8.8)
PROT SERPL-MCNC: 7.9 G/DL (ref 6.8–8.8)
PROT SERPL-MCNC: 8.1 G/DL (ref 6.8–8.8)
PROT SERPL-MCNC: 8.2 G/DL (ref 6.8–8.8)
PROT SERPL-MCNC: 8.4 G/DL (ref 6.8–8.8)
QRS DURATION - MUSE: 114 MS
QRS DURATION - MUSE: 132 MS
QRS DURATION - MUSE: 132 MS
QRS DURATION - MUSE: 134 MS
QT - MUSE: 374 MS
QT - MUSE: 378 MS
QT - MUSE: 414 MS
QT - MUSE: 434 MS
QTC - MUSE: 469 MS
QTC - MUSE: 492 MS
QTC - MUSE: 500 MS
QTC - MUSE: 528 MS
R AXIS - MUSE: -43 DEGREES
R AXIS - MUSE: -69 DEGREES
R AXIS - MUSE: -75 DEGREES
R AXIS - MUSE: -83 DEGREES
RBC # BLD AUTO: 2.6 10E6/UL (ref 3.8–5.2)
RBC # BLD AUTO: 2.67 10E6/UL (ref 3.8–5.2)
RBC # BLD AUTO: 2.7 10E6/UL (ref 3.8–5.2)
RBC # BLD AUTO: 2.75 10E6/UL (ref 3.8–5.2)
RBC # BLD AUTO: 2.76 10E6/UL (ref 3.8–5.2)
RBC # BLD AUTO: 2.83 10E6/UL (ref 3.8–5.2)
RBC # BLD AUTO: 2.84 10E6/UL (ref 3.8–5.2)
RBC # BLD AUTO: 2.85 10E6/UL (ref 3.8–5.2)
RBC # BLD AUTO: 2.86 10E6/UL (ref 3.8–5.2)
RBC # BLD AUTO: 2.88 10E6/UL (ref 3.8–5.2)
RBC # BLD AUTO: 2.93 10E6/UL (ref 3.8–5.2)
RBC # BLD AUTO: 2.95 10E6/UL (ref 3.8–5.2)
RBC # BLD AUTO: 2.98 10E6/UL (ref 3.8–5.2)
RBC # BLD AUTO: 3.09 10E6/UL (ref 3.8–5.2)
RBC # BLD AUTO: 3.33 10E6/UL (ref 3.8–5.2)
RBC # BLD AUTO: 3.8 10E6/UL (ref 3.8–5.2)
RBC # BLD AUTO: 4.26 10E6/UL (ref 3.8–5.2)
RBC # BLD AUTO: 4.7 10E6/UL (ref 3.8–5.2)
RBC # BLD AUTO: 4.95 10E6/UL (ref 3.8–5.2)
RBC # BLD AUTO: 4.95 10E6/UL (ref 3.8–5.2)
RBC # BLD AUTO: 4.99 10E6/UL (ref 3.8–5.2)
RBC # BLD AUTO: 5.08 10E6/UL (ref 3.8–5.2)
RBC # BLD AUTO: 5.19 10E6/UL (ref 3.8–5.2)
RBC # BLD AUTO: 5.28 10E6/UL (ref 3.8–5.2)
RBC # BLD AUTO: 5.51 10E6/UL (ref 3.8–5.2)
RBC # FLD: 988 /UL
RBC URINE: >182 /HPF
SA TARGET DNA: POSITIVE
SARS-COV-2 RNA RESP QL NAA+PROBE: NEGATIVE
SARS-COV-2 RNA RESP QL NAA+PROBE: NEGATIVE
SODIUM SERPL-SCNC: 139 MMOL/L (ref 133–144)
SODIUM SERPL-SCNC: 140 MMOL/L (ref 133–144)
SODIUM SERPL-SCNC: 141 MMOL/L (ref 133–144)
SODIUM SERPL-SCNC: 142 MMOL/L (ref 133–144)
SODIUM SERPL-SCNC: 143 MMOL/L (ref 133–144)
SODIUM SERPL-SCNC: 143 MMOL/L (ref 133–144)
SODIUM SERPL-SCNC: 144 MMOL/L (ref 133–144)
SODIUM SERPL-SCNC: 145 MMOL/L (ref 133–144)
SODIUM SERPL-SCNC: 146 MMOL/L (ref 133–144)
SODIUM SERPL-SCNC: 147 MMOL/L (ref 133–144)
SODIUM SERPL-SCNC: 148 MMOL/L (ref 133–144)
SODIUM SERPL-SCNC: 149 MMOL/L (ref 133–144)
SODIUM UR-SCNC: 12 MMOL/L
SP GR UR STRIP: 1.04 (ref 1–1.03)
SPECIMEN EXPIRATION DATE: NORMAL
SPECIMEN EXPIRATION DATE: NORMAL
SYSTOLIC BLOOD PRESSURE - MUSE: NORMAL MMHG
T AXIS - MUSE: 45 DEGREES
T AXIS - MUSE: 54 DEGREES
T AXIS - MUSE: 56 DEGREES
T AXIS - MUSE: 67 DEGREES
TRANSITIONAL EPI: 6 /HPF
TRIGL SERPL-MCNC: 178 MG/DL
TROPONIN I SERPL HS-MCNC: 32 NG/L
TROPONIN I SERPL HS-MCNC: 35 NG/L
UFH PPP CHRO-ACNC: 0.1 IU/ML
UFH PPP CHRO-ACNC: 0.1 IU/ML
UFH PPP CHRO-ACNC: 0.14 IU/ML
UFH PPP CHRO-ACNC: 0.14 IU/ML
UFH PPP CHRO-ACNC: 0.19 IU/ML
UFH PPP CHRO-ACNC: 0.19 IU/ML
UFH PPP CHRO-ACNC: 0.2 IU/ML
UFH PPP CHRO-ACNC: 0.21 IU/ML
UFH PPP CHRO-ACNC: 0.24 IU/ML
UFH PPP CHRO-ACNC: 0.24 IU/ML
UFH PPP CHRO-ACNC: 0.26 IU/ML
UFH PPP CHRO-ACNC: 0.27 IU/ML
UFH PPP CHRO-ACNC: 0.28 IU/ML
UFH PPP CHRO-ACNC: 0.3 IU/ML
UFH PPP CHRO-ACNC: 0.3 IU/ML
UFH PPP CHRO-ACNC: 0.31 IU/ML
UFH PPP CHRO-ACNC: 0.33 IU/ML
UFH PPP CHRO-ACNC: 0.34 IU/ML
UFH PPP CHRO-ACNC: 0.35 IU/ML
UFH PPP CHRO-ACNC: 0.36 IU/ML
UFH PPP CHRO-ACNC: 0.37 IU/ML
UFH PPP CHRO-ACNC: 0.39 IU/ML
UFH PPP CHRO-ACNC: 0.4 IU/ML
UFH PPP CHRO-ACNC: 0.41 IU/ML
UFH PPP CHRO-ACNC: 0.47 IU/ML
UFH PPP CHRO-ACNC: 0.48 IU/ML
UFH PPP CHRO-ACNC: 0.52 IU/ML
UFH PPP CHRO-ACNC: 0.53 IU/ML
UFH PPP CHRO-ACNC: <0.1 IU/ML
UNIT ABO/RH: NORMAL
UNIT NUMBER: NORMAL
UNIT STATUS: NORMAL
UNIT TYPE ISBT: 9500
UROBILINOGEN UR STRIP-MCNC: 2 MG/DL
VANCOMYCIN SERPL-MCNC: 17.2 MG/L
VANCOMYCIN SERPL-MCNC: 20 MG/L
VANCOMYCIN SERPL-MCNC: 22.5 MG/L
VANCOMYCIN SERPL-MCNC: 26.1 MG/L
VANCOMYCIN SERPL-MCNC: 29 MG/L
VENTRICULAR RATE- MUSE: 102 BPM
VENTRICULAR RATE- MUSE: 88 BPM
VENTRICULAR RATE- MUSE: 89 BPM
VENTRICULAR RATE- MUSE: 95 BPM
WBC # BLD AUTO: 11.8 10E3/UL (ref 4–11)
WBC # BLD AUTO: 12.7 10E3/UL (ref 4–11)
WBC # BLD AUTO: 13 10E3/UL (ref 4–11)
WBC # BLD AUTO: 13.5 10E3/UL (ref 4–11)
WBC # BLD AUTO: 15.3 10E3/UL (ref 4–11)
WBC # BLD AUTO: 16.2 10E3/UL (ref 4–11)
WBC # BLD AUTO: 16.3 10E3/UL (ref 4–11)
WBC # BLD AUTO: 16.5 10E3/UL (ref 4–11)
WBC # BLD AUTO: 16.6 10E3/UL (ref 4–11)
WBC # BLD AUTO: 16.9 10E3/UL (ref 4–11)
WBC # BLD AUTO: 17.1 10E3/UL (ref 4–11)
WBC # BLD AUTO: 17.3 10E3/UL (ref 4–11)
WBC # BLD AUTO: 18 10E3/UL (ref 4–11)
WBC # BLD AUTO: 18.1 10E3/UL (ref 4–11)
WBC # BLD AUTO: 18.9 10E3/UL (ref 4–11)
WBC # BLD AUTO: 19 10E3/UL (ref 4–11)
WBC # BLD AUTO: 21 10E3/UL (ref 4–11)
WBC # BLD AUTO: 21.3 10E3/UL (ref 4–11)
WBC # BLD AUTO: 23.8 10E3/UL (ref 4–11)
WBC # BLD AUTO: 25.1 10E3/UL (ref 4–11)
WBC # BLD AUTO: 26.7 10E3/UL (ref 4–11)
WBC # BLD AUTO: 27.3 10E3/UL (ref 4–11)
WBC # BLD AUTO: 29.1 10E3/UL (ref 4–11)
WBC # FLD AUTO: 14 /UL
WBC # FLD AUTO: 208 /UL
WBC # FLD AUTO: 2793 /UL
WBC CLUMPS #/AREA URNS HPF: PRESENT /HPF
WBC URINE: >182 /HPF
YEAST #/AREA URNS HPF: ABNORMAL /HPF

## 2022-01-01 PROCEDURE — 87106 FUNGI IDENTIFICATION YEAST: CPT | Performed by: INTERNAL MEDICINE

## 2022-01-01 PROCEDURE — 250N000013 HC RX MED GY IP 250 OP 250 PS 637: Performed by: INTERNAL MEDICINE

## 2022-01-01 PROCEDURE — 82805 BLOOD GASES W/O2 SATURATION: CPT | Performed by: HOSPITALIST

## 2022-01-01 PROCEDURE — 250N000013 HC RX MED GY IP 250 OP 250 PS 637: Performed by: PHYSICIAN ASSISTANT

## 2022-01-01 PROCEDURE — 36415 COLL VENOUS BLD VENIPUNCTURE: CPT | Performed by: INTERNAL MEDICINE

## 2022-01-01 PROCEDURE — 94645 CONT INHLJ TX EACH ADDL HOUR: CPT

## 2022-01-01 PROCEDURE — 250N000011 HC RX IP 250 OP 636: Performed by: PHYSICIAN ASSISTANT

## 2022-01-01 PROCEDURE — 250N000013 HC RX MED GY IP 250 OP 250 PS 637: Performed by: HOSPITALIST

## 2022-01-01 PROCEDURE — 85520 HEPARIN ASSAY: CPT | Performed by: INTERNAL MEDICINE

## 2022-01-01 PROCEDURE — 120N000001 HC R&B MED SURG/OB

## 2022-01-01 PROCEDURE — 250N000013 HC RX MED GY IP 250 OP 250 PS 637: Performed by: NURSE PRACTITIONER

## 2022-01-01 PROCEDURE — 250N000011 HC RX IP 250 OP 636: Performed by: INTERNAL MEDICINE

## 2022-01-01 PROCEDURE — 94003 VENT MGMT INPAT SUBQ DAY: CPT

## 2022-01-01 PROCEDURE — 258N000001 HC RX 258: Performed by: PEDIATRICS

## 2022-01-01 PROCEDURE — 250N000011 HC RX IP 250 OP 636

## 2022-01-01 PROCEDURE — 94640 AIRWAY INHALATION TREATMENT: CPT

## 2022-01-01 PROCEDURE — 94640 AIRWAY INHALATION TREATMENT: CPT | Mod: 76

## 2022-01-01 PROCEDURE — 97140 MANUAL THERAPY 1/> REGIONS: CPT | Mod: GO | Performed by: OCCUPATIONAL THERAPIST

## 2022-01-01 PROCEDURE — 31500 INSERT EMERGENCY AIRWAY: CPT | Performed by: NURSE ANESTHETIST, CERTIFIED REGISTERED

## 2022-01-01 PROCEDURE — 250N000011 HC RX IP 250 OP 636: Performed by: SPECIALIST

## 2022-01-01 PROCEDURE — 99233 SBSQ HOSP IP/OBS HIGH 50: CPT | Performed by: INTERNAL MEDICINE

## 2022-01-01 PROCEDURE — 85027 COMPLETE CBC AUTOMATED: CPT | Performed by: PHYSICIAN ASSISTANT

## 2022-01-01 PROCEDURE — 250N000009 HC RX 250: Performed by: INTERNAL MEDICINE

## 2022-01-01 PROCEDURE — 999N000009 HC STATISTIC AIRWAY CARE

## 2022-01-01 PROCEDURE — 36415 COLL VENOUS BLD VENIPUNCTURE: CPT | Performed by: PHYSICIAN ASSISTANT

## 2022-01-01 PROCEDURE — 250N000011 HC RX IP 250 OP 636: Performed by: STUDENT IN AN ORGANIZED HEALTH CARE EDUCATION/TRAINING PROGRAM

## 2022-01-01 PROCEDURE — 85520 HEPARIN ASSAY: CPT | Performed by: THORACIC SURGERY (CARDIOTHORACIC VASCULAR SURGERY)

## 2022-01-01 PROCEDURE — 250N000013 HC RX MED GY IP 250 OP 250 PS 637: Performed by: THORACIC SURGERY (CARDIOTHORACIC VASCULAR SURGERY)

## 2022-01-01 PROCEDURE — 99291 CRITICAL CARE FIRST HOUR: CPT | Mod: GC

## 2022-01-01 PROCEDURE — G0463 HOSPITAL OUTPT CLINIC VISIT: HCPCS

## 2022-01-01 PROCEDURE — 86706 HEP B SURFACE ANTIBODY: CPT | Performed by: INTERNAL MEDICINE

## 2022-01-01 PROCEDURE — 250N000009 HC RX 250: Performed by: NURSE PRACTITIONER

## 2022-01-01 PROCEDURE — 84132 ASSAY OF SERUM POTASSIUM: CPT | Performed by: INTERNAL MEDICINE

## 2022-01-01 PROCEDURE — 82803 BLOOD GASES ANY COMBINATION: CPT | Performed by: PEDIATRICS

## 2022-01-01 PROCEDURE — 87040 BLOOD CULTURE FOR BACTERIA: CPT | Performed by: NURSE PRACTITIONER

## 2022-01-01 PROCEDURE — 80048 BASIC METABOLIC PNL TOTAL CA: CPT | Performed by: INTERNAL MEDICINE

## 2022-01-01 PROCEDURE — 93005 ELECTROCARDIOGRAM TRACING: CPT

## 2022-01-01 PROCEDURE — 200N000001 HC R&B ICU

## 2022-01-01 PROCEDURE — 71045 X-RAY EXAM CHEST 1 VIEW: CPT

## 2022-01-01 PROCEDURE — 120N000013 HC R&B IMCU

## 2022-01-01 PROCEDURE — 36592 COLLECT BLOOD FROM PICC: CPT

## 2022-01-01 PROCEDURE — 258N000003 HC RX IP 258 OP 636: Performed by: STUDENT IN AN ORGANIZED HEALTH CARE EDUCATION/TRAINING PROGRAM

## 2022-01-01 PROCEDURE — 258N000003 HC RX IP 258 OP 636: Performed by: INTERNAL MEDICINE

## 2022-01-01 PROCEDURE — 84132 ASSAY OF SERUM POTASSIUM: CPT | Performed by: PEDIATRICS

## 2022-01-01 PROCEDURE — 31624 DX BRONCHOSCOPE/LAVAGE: CPT | Performed by: INTERNAL MEDICINE

## 2022-01-01 PROCEDURE — 80053 COMPREHEN METABOLIC PANEL: CPT | Performed by: INTERNAL MEDICINE

## 2022-01-01 PROCEDURE — 84100 ASSAY OF PHOSPHORUS: CPT | Performed by: NURSE PRACTITIONER

## 2022-01-01 PROCEDURE — 250N000013 HC RX MED GY IP 250 OP 250 PS 637: Performed by: PEDIATRICS

## 2022-01-01 PROCEDURE — 94660 CPAP INITIATION&MGMT: CPT

## 2022-01-01 PROCEDURE — 83615 LACTATE (LD) (LDH) ENZYME: CPT | Performed by: NURSE PRACTITIONER

## 2022-01-01 PROCEDURE — 250N000011 HC RX IP 250 OP 636: Performed by: NURSE PRACTITIONER

## 2022-01-01 PROCEDURE — 250N000011 HC RX IP 250 OP 636: Performed by: HOSPITALIST

## 2022-01-01 PROCEDURE — 83605 ASSAY OF LACTIC ACID: CPT | Performed by: NURSE PRACTITIONER

## 2022-01-01 PROCEDURE — 99291 CRITICAL CARE FIRST HOUR: CPT | Performed by: INTERNAL MEDICINE

## 2022-01-01 PROCEDURE — 85027 COMPLETE CBC AUTOMATED: CPT | Performed by: STUDENT IN AN ORGANIZED HEALTH CARE EDUCATION/TRAINING PROGRAM

## 2022-01-01 PROCEDURE — 250N000011 HC RX IP 250 OP 636: Performed by: NURSE ANESTHETIST, CERTIFIED REGISTERED

## 2022-01-01 PROCEDURE — 258N000003 HC RX IP 258 OP 636: Performed by: NURSE PRACTITIONER

## 2022-01-01 PROCEDURE — 85027 COMPLETE CBC AUTOMATED: CPT | Performed by: INTERNAL MEDICINE

## 2022-01-01 PROCEDURE — 85520 HEPARIN ASSAY: CPT | Performed by: PEDIATRICS

## 2022-01-01 PROCEDURE — 87635 SARS-COV-2 COVID-19 AMP PRB: CPT | Performed by: PEDIATRICS

## 2022-01-01 PROCEDURE — 82040 ASSAY OF SERUM ALBUMIN: CPT | Performed by: PHYSICIAN ASSISTANT

## 2022-01-01 PROCEDURE — 0W9B30Z DRAINAGE OF LEFT PLEURAL CAVITY WITH DRAINAGE DEVICE, PERCUTANEOUS APPROACH: ICD-10-PCS | Performed by: INTERNAL MEDICINE

## 2022-01-01 PROCEDURE — 86140 C-REACTIVE PROTEIN: CPT | Performed by: SPECIALIST

## 2022-01-01 PROCEDURE — 82803 BLOOD GASES ANY COMBINATION: CPT | Performed by: NURSE PRACTITIONER

## 2022-01-01 PROCEDURE — 99221 1ST HOSP IP/OBS SF/LOW 40: CPT | Mod: 25 | Performed by: INTERNAL MEDICINE

## 2022-01-01 PROCEDURE — 999N000157 HC STATISTIC RCP TIME EA 10 MIN

## 2022-01-01 PROCEDURE — 87635 SARS-COV-2 COVID-19 AMP PRB: CPT | Performed by: NURSE PRACTITIONER

## 2022-01-01 PROCEDURE — 86850 RBC ANTIBODY SCREEN: CPT | Performed by: PHYSICIAN ASSISTANT

## 2022-01-01 PROCEDURE — 84145 PROCALCITONIN (PCT): CPT | Performed by: NURSE PRACTITIONER

## 2022-01-01 PROCEDURE — 84132 ASSAY OF SERUM POTASSIUM: CPT | Performed by: NURSE PRACTITIONER

## 2022-01-01 PROCEDURE — 82805 BLOOD GASES W/O2 SATURATION: CPT | Performed by: INTERNAL MEDICINE

## 2022-01-01 PROCEDURE — 97530 THERAPEUTIC ACTIVITIES: CPT | Mod: GO | Performed by: OCCUPATIONAL THERAPIST

## 2022-01-01 PROCEDURE — P9016 RBC LEUKOCYTES REDUCED: HCPCS | Performed by: INTERNAL MEDICINE

## 2022-01-01 PROCEDURE — 99291 CRITICAL CARE FIRST HOUR: CPT | Mod: 25 | Performed by: NURSE PRACTITIONER

## 2022-01-01 PROCEDURE — 80202 ASSAY OF VANCOMYCIN: CPT | Performed by: INTERNAL MEDICINE

## 2022-01-01 PROCEDURE — 99231 SBSQ HOSP IP/OBS SF/LOW 25: CPT | Performed by: NURSE PRACTITIONER

## 2022-01-01 PROCEDURE — 85520 HEPARIN ASSAY: CPT | Performed by: NURSE PRACTITIONER

## 2022-01-01 PROCEDURE — 258N000003 HC RX IP 258 OP 636: Performed by: HOSPITALIST

## 2022-01-01 PROCEDURE — 83735 ASSAY OF MAGNESIUM: CPT | Performed by: NURSE PRACTITIONER

## 2022-01-01 PROCEDURE — 87116 MYCOBACTERIA CULTURE: CPT | Performed by: INTERNAL MEDICINE

## 2022-01-01 PROCEDURE — 99291 CRITICAL CARE FIRST HOUR: CPT | Performed by: NURSE PRACTITIONER

## 2022-01-01 PROCEDURE — 36600 WITHDRAWAL OF ARTERIAL BLOOD: CPT

## 2022-01-01 PROCEDURE — 250N000009 HC RX 250: Performed by: STUDENT IN AN ORGANIZED HEALTH CARE EDUCATION/TRAINING PROGRAM

## 2022-01-01 PROCEDURE — 999N000127 HC STATISTIC PERIPHERAL IV START W US GUIDANCE

## 2022-01-01 PROCEDURE — 80053 COMPREHEN METABOLIC PANEL: CPT | Performed by: PHYSICIAN ASSISTANT

## 2022-01-01 PROCEDURE — 84145 PROCALCITONIN (PCT): CPT | Performed by: SPECIALIST

## 2022-01-01 PROCEDURE — 84132 ASSAY OF SERUM POTASSIUM: CPT | Performed by: DIETITIAN, REGISTERED

## 2022-01-01 PROCEDURE — 250N000012 HC RX MED GY IP 250 OP 636 PS 637: Performed by: PEDIATRICS

## 2022-01-01 PROCEDURE — 85018 HEMOGLOBIN: CPT | Performed by: STUDENT IN AN ORGANIZED HEALTH CARE EDUCATION/TRAINING PROGRAM

## 2022-01-01 PROCEDURE — 255N000002 HC RX 255 OP 636: Performed by: PHYSICIAN ASSISTANT

## 2022-01-01 PROCEDURE — 999N000065 XR CHEST PORT 1 VIEW

## 2022-01-01 PROCEDURE — 85018 HEMOGLOBIN: CPT | Performed by: INTERNAL MEDICINE

## 2022-01-01 PROCEDURE — 36620 INSERTION CATHETER ARTERY: CPT | Performed by: NURSE PRACTITIONER

## 2022-01-01 PROCEDURE — 250N000011 HC RX IP 250 OP 636: Performed by: PEDIATRICS

## 2022-01-01 PROCEDURE — 85018 HEMOGLOBIN: CPT | Performed by: NURSE PRACTITIONER

## 2022-01-01 PROCEDURE — 99233 SBSQ HOSP IP/OBS HIGH 50: CPT | Performed by: HOSPITALIST

## 2022-01-01 PROCEDURE — 84478 ASSAY OF TRIGLYCERIDES: CPT | Performed by: INTERNAL MEDICINE

## 2022-01-01 PROCEDURE — 89051 BODY FLUID CELL COUNT: CPT | Performed by: HOSPITALIST

## 2022-01-01 PROCEDURE — 85520 HEPARIN ASSAY: CPT | Performed by: STUDENT IN AN ORGANIZED HEALTH CARE EDUCATION/TRAINING PROGRAM

## 2022-01-01 PROCEDURE — 88305 TISSUE EXAM BY PATHOLOGIST: CPT | Mod: TC | Performed by: HOSPITALIST

## 2022-01-01 PROCEDURE — 84132 ASSAY OF SERUM POTASSIUM: CPT | Performed by: STUDENT IN AN ORGANIZED HEALTH CARE EDUCATION/TRAINING PROGRAM

## 2022-01-01 PROCEDURE — 87102 FUNGUS ISOLATION CULTURE: CPT | Performed by: INTERNAL MEDICINE

## 2022-01-01 PROCEDURE — 97530 THERAPEUTIC ACTIVITIES: CPT | Mod: GP

## 2022-01-01 PROCEDURE — 99233 SBSQ HOSP IP/OBS HIGH 50: CPT | Mod: GC | Performed by: INTERNAL MEDICINE

## 2022-01-01 PROCEDURE — 87106 FUNGI IDENTIFICATION YEAST: CPT | Performed by: NURSE PRACTITIONER

## 2022-01-01 PROCEDURE — 31622 DX BRONCHOSCOPE/WASH: CPT | Performed by: NURSE PRACTITIONER

## 2022-01-01 PROCEDURE — 84145 PROCALCITONIN (PCT): CPT | Performed by: STUDENT IN AN ORGANIZED HEALTH CARE EDUCATION/TRAINING PROGRAM

## 2022-01-01 PROCEDURE — 85027 COMPLETE CBC AUTOMATED: CPT | Performed by: NURSE PRACTITIONER

## 2022-01-01 PROCEDURE — P9016 RBC LEUKOCYTES REDUCED: HCPCS | Performed by: PEDIATRICS

## 2022-01-01 PROCEDURE — 86923 COMPATIBILITY TEST ELECTRIC: CPT | Performed by: INTERNAL MEDICINE

## 2022-01-01 PROCEDURE — 93306 TTE W/DOPPLER COMPLETE: CPT | Mod: 26 | Performed by: INTERNAL MEDICINE

## 2022-01-01 PROCEDURE — 93971 EXTREMITY STUDY: CPT | Mod: RT

## 2022-01-01 PROCEDURE — 85520 HEPARIN ASSAY: CPT | Performed by: HOSPITALIST

## 2022-01-01 PROCEDURE — 32551 INSERTION OF CHEST TUBE: CPT | Performed by: NURSE PRACTITIONER

## 2022-01-01 PROCEDURE — 87205 SMEAR GRAM STAIN: CPT | Performed by: INTERNAL MEDICINE

## 2022-01-01 PROCEDURE — 83735 ASSAY OF MAGNESIUM: CPT | Performed by: PHYSICIAN ASSISTANT

## 2022-01-01 PROCEDURE — 36415 COLL VENOUS BLD VENIPUNCTURE: CPT | Performed by: HOSPITALIST

## 2022-01-01 PROCEDURE — 99221 1ST HOSP IP/OBS SF/LOW 40: CPT | Performed by: PHYSICIAN ASSISTANT

## 2022-01-01 PROCEDURE — 71045 X-RAY EXAM CHEST 1 VIEW: CPT | Mod: 76

## 2022-01-01 PROCEDURE — 97166 OT EVAL MOD COMPLEX 45 MIN: CPT | Mod: GO | Performed by: OCCUPATIONAL THERAPIST

## 2022-01-01 PROCEDURE — 87205 SMEAR GRAM STAIN: CPT | Performed by: HOSPITALIST

## 2022-01-01 PROCEDURE — 86901 BLOOD TYPING SEROLOGIC RH(D): CPT | Performed by: STUDENT IN AN ORGANIZED HEALTH CARE EDUCATION/TRAINING PROGRAM

## 2022-01-01 PROCEDURE — P9040 RBC LEUKOREDUCED IRRADIATED: HCPCS | Performed by: INTERNAL MEDICINE

## 2022-01-01 PROCEDURE — 999N000185 HC STATISTIC TRANSPORT TIME EA 15 MIN

## 2022-01-01 PROCEDURE — 99223 1ST HOSP IP/OBS HIGH 75: CPT | Performed by: INTERNAL MEDICINE

## 2022-01-01 PROCEDURE — 87340 HEPATITIS B SURFACE AG IA: CPT | Performed by: INTERNAL MEDICINE

## 2022-01-01 PROCEDURE — C9113 INJ PANTOPRAZOLE SODIUM, VIA: HCPCS | Performed by: INTERNAL MEDICINE

## 2022-01-01 PROCEDURE — 97162 PT EVAL MOD COMPLEX 30 MIN: CPT | Mod: GP

## 2022-01-01 PROCEDURE — P9016 RBC LEUKOCYTES REDUCED: HCPCS | Performed by: PHYSICIAN ASSISTANT

## 2022-01-01 PROCEDURE — 84155 ASSAY OF PROTEIN SERUM: CPT | Performed by: HOSPITALIST

## 2022-01-01 PROCEDURE — 84100 ASSAY OF PHOSPHORUS: CPT | Performed by: PHYSICIAN ASSISTANT

## 2022-01-01 PROCEDURE — 87075 CULTR BACTERIA EXCEPT BLOOD: CPT | Performed by: NURSE PRACTITIONER

## 2022-01-01 PROCEDURE — 85610 PROTHROMBIN TIME: CPT | Performed by: NURSE PRACTITIONER

## 2022-01-01 PROCEDURE — 87086 URINE CULTURE/COLONY COUNT: CPT | Performed by: NURSE PRACTITIONER

## 2022-01-01 PROCEDURE — 80202 ASSAY OF VANCOMYCIN: CPT

## 2022-01-01 PROCEDURE — 999N000040 HC STATISTIC CONSULT NO CHARGE VASC ACCESS

## 2022-01-01 PROCEDURE — 255N000002 HC RX 255 OP 636: Performed by: EMERGENCY MEDICINE

## 2022-01-01 PROCEDURE — P9047 ALBUMIN (HUMAN), 25%, 50ML: HCPCS | Performed by: INTERNAL MEDICINE

## 2022-01-01 PROCEDURE — 88305 TISSUE EXAM BY PATHOLOGIST: CPT | Mod: TC | Performed by: INTERNAL MEDICINE

## 2022-01-01 PROCEDURE — 250N000013 HC RX MED GY IP 250 OP 250 PS 637

## 2022-01-01 PROCEDURE — 87205 SMEAR GRAM STAIN: CPT | Performed by: NURSE PRACTITIONER

## 2022-01-01 PROCEDURE — 250N000012 HC RX MED GY IP 250 OP 636 PS 637: Performed by: PHYSICIAN ASSISTANT

## 2022-01-01 PROCEDURE — 83615 LACTATE (LD) (LDH) ENZYME: CPT | Performed by: HOSPITALIST

## 2022-01-01 PROCEDURE — 272N000706 US THORACENTESIS

## 2022-01-01 PROCEDURE — 36556 INSERT NON-TUNNEL CV CATH: CPT | Performed by: INTERNAL MEDICINE

## 2022-01-01 PROCEDURE — 84300 ASSAY OF URINE SODIUM: CPT | Performed by: NURSE PRACTITIONER

## 2022-01-01 PROCEDURE — 84132 ASSAY OF SERUM POTASSIUM: CPT

## 2022-01-01 PROCEDURE — 36415 COLL VENOUS BLD VENIPUNCTURE: CPT | Performed by: NURSE PRACTITIONER

## 2022-01-01 PROCEDURE — 99291 CRITICAL CARE FIRST HOUR: CPT | Mod: 25 | Performed by: INTERNAL MEDICINE

## 2022-01-01 PROCEDURE — 83880 ASSAY OF NATRIURETIC PEPTIDE: CPT | Performed by: HOSPITALIST

## 2022-01-01 PROCEDURE — 88112 CYTOPATH CELL ENHANCE TECH: CPT | Mod: 26 | Performed by: PATHOLOGY

## 2022-01-01 PROCEDURE — 99291 CRITICAL CARE FIRST HOUR: CPT | Performed by: PHYSICIAN ASSISTANT

## 2022-01-01 PROCEDURE — 80069 RENAL FUNCTION PANEL: CPT | Performed by: INTERNAL MEDICINE

## 2022-01-01 PROCEDURE — 87641 MR-STAPH DNA AMP PROBE: CPT | Performed by: SPECIALIST

## 2022-01-01 PROCEDURE — 272N000272 HC CONTINUOUS NEBULIZER MICRO PUMP

## 2022-01-01 PROCEDURE — 92526 ORAL FUNCTION THERAPY: CPT | Mod: GN

## 2022-01-01 PROCEDURE — 250N000009 HC RX 250: Performed by: PEDIATRICS

## 2022-01-01 PROCEDURE — 84157 ASSAY OF PROTEIN OTHER: CPT | Performed by: HOSPITALIST

## 2022-01-01 PROCEDURE — 999N000208 ECHOCARDIOGRAM LIMITED

## 2022-01-01 PROCEDURE — 74177 CT ABD & PELVIS W/CONTRAST: CPT

## 2022-01-01 PROCEDURE — 88305 TISSUE EXAM BY PATHOLOGIST: CPT | Mod: 26 | Performed by: PATHOLOGY

## 2022-01-01 PROCEDURE — 258N000003 HC RX IP 258 OP 636: Performed by: PEDIATRICS

## 2022-01-01 PROCEDURE — 89051 BODY FLUID CELL COUNT: CPT | Performed by: INTERNAL MEDICINE

## 2022-01-01 PROCEDURE — 71250 CT THORAX DX C-: CPT

## 2022-01-01 PROCEDURE — 82945 GLUCOSE OTHER FLUID: CPT | Performed by: HOSPITALIST

## 2022-01-01 PROCEDURE — 36592 COLLECT BLOOD FROM PICC: CPT | Performed by: INTERNAL MEDICINE

## 2022-01-01 PROCEDURE — 84132 ASSAY OF SERUM POTASSIUM: CPT | Performed by: HOSPITALIST

## 2022-01-01 PROCEDURE — 82310 ASSAY OF CALCIUM: CPT | Performed by: NURSE PRACTITIONER

## 2022-01-01 PROCEDURE — 999N000128 HC STATISTIC PERIPHERAL IV START W/O US GUIDANCE

## 2022-01-01 PROCEDURE — 86923 COMPATIBILITY TEST ELECTRIC: CPT | Performed by: PHYSICIAN ASSISTANT

## 2022-01-01 PROCEDURE — 76770 US EXAM ABDO BACK WALL COMP: CPT

## 2022-01-01 PROCEDURE — C9113 INJ PANTOPRAZOLE SODIUM, VIA: HCPCS | Performed by: PHYSICIAN ASSISTANT

## 2022-01-01 PROCEDURE — 87210 SMEAR WET MOUNT SALINE/INK: CPT | Performed by: INTERNAL MEDICINE

## 2022-01-01 PROCEDURE — 250N000013 HC RX MED GY IP 250 OP 250 PS 637: Performed by: DIETITIAN, REGISTERED

## 2022-01-01 PROCEDURE — 0B938ZZ DRAINAGE OF RIGHT MAIN BRONCHUS, VIA NATURAL OR ARTIFICIAL OPENING ENDOSCOPIC: ICD-10-PCS

## 2022-01-01 PROCEDURE — 999N000208 ECHOCARDIOGRAM COMPLETE

## 2022-01-01 PROCEDURE — 93321 DOPPLER ECHO F-UP/LMTD STD: CPT | Mod: 26 | Performed by: INTERNAL MEDICINE

## 2022-01-01 PROCEDURE — 87449 NOS EACH ORGANISM AG IA: CPT | Performed by: NURSE PRACTITIONER

## 2022-01-01 PROCEDURE — 370N000003 HC ANESTHESIA WARD SERVICE

## 2022-01-01 PROCEDURE — 99207 PR NON-BILLABLE SERV PER CHARTING: CPT | Performed by: INTERNAL MEDICINE

## 2022-01-01 PROCEDURE — 999N000065 XR ABDOMEN PORT 1 VIEWS

## 2022-01-01 PROCEDURE — 93308 TTE F-UP OR LMTD: CPT | Mod: 26 | Performed by: INTERNAL MEDICINE

## 2022-01-01 PROCEDURE — 93321 DOPPLER ECHO F-UP/LMTD STD: CPT

## 2022-01-01 PROCEDURE — 82803 BLOOD GASES ANY COMBINATION: CPT | Performed by: STUDENT IN AN ORGANIZED HEALTH CARE EDUCATION/TRAINING PROGRAM

## 2022-01-01 PROCEDURE — 0B978ZZ DRAINAGE OF LEFT MAIN BRONCHUS, VIA NATURAL OR ARTIFICIAL OPENING ENDOSCOPIC: ICD-10-PCS

## 2022-01-01 PROCEDURE — 250N000011 HC RX IP 250 OP 636: Performed by: THORACIC SURGERY (CARDIOTHORACIC VASCULAR SURGERY)

## 2022-01-01 PROCEDURE — 83880 ASSAY OF NATRIURETIC PEPTIDE: CPT | Performed by: NURSE PRACTITIONER

## 2022-01-01 PROCEDURE — 87070 CULTURE OTHR SPECIMN AEROBIC: CPT | Performed by: NURSE PRACTITIONER

## 2022-01-01 PROCEDURE — 92526 ORAL FUNCTION THERAPY: CPT | Mod: GN | Performed by: SPEECH-LANGUAGE PATHOLOGIST

## 2022-01-01 PROCEDURE — 250N000013 HC RX MED GY IP 250 OP 250 PS 637: Performed by: STUDENT IN AN ORGANIZED HEALTH CARE EDUCATION/TRAINING PROGRAM

## 2022-01-01 PROCEDURE — 84145 PROCALCITONIN (PCT): CPT | Performed by: PHYSICIAN ASSISTANT

## 2022-01-01 PROCEDURE — 82310 ASSAY OF CALCIUM: CPT | Performed by: PEDIATRICS

## 2022-01-01 PROCEDURE — 97110 THERAPEUTIC EXERCISES: CPT | Mod: GO | Performed by: OCCUPATIONAL THERAPIST

## 2022-01-01 PROCEDURE — 250N000009 HC RX 250: Performed by: PHYSICIAN ASSISTANT

## 2022-01-01 PROCEDURE — 89051 BODY FLUID CELL COUNT: CPT | Performed by: NURSE PRACTITIONER

## 2022-01-01 PROCEDURE — 85018 HEMOGLOBIN: CPT | Performed by: PHYSICIAN ASSISTANT

## 2022-01-01 PROCEDURE — 81001 URINALYSIS AUTO W/SCOPE: CPT | Performed by: NURSE PRACTITIONER

## 2022-01-01 PROCEDURE — 82310 ASSAY OF CALCIUM: CPT | Performed by: PHYSICIAN ASSISTANT

## 2022-01-01 PROCEDURE — 85520 HEPARIN ASSAY: CPT

## 2022-01-01 PROCEDURE — 85025 COMPLETE CBC W/AUTO DIFF WBC: CPT | Performed by: INTERNAL MEDICINE

## 2022-01-01 PROCEDURE — 86923 COMPATIBILITY TEST ELECTRIC: CPT | Performed by: PEDIATRICS

## 2022-01-01 PROCEDURE — 93325 DOPPLER ECHO COLOR FLOW MAPG: CPT | Mod: 26 | Performed by: INTERNAL MEDICINE

## 2022-01-01 PROCEDURE — 84132 ASSAY OF SERUM POTASSIUM: CPT | Performed by: THORACIC SURGERY (CARDIOTHORACIC VASCULAR SURGERY)

## 2022-01-01 PROCEDURE — 88112 CYTOPATH CELL ENHANCE TECH: CPT | Mod: TC | Performed by: HOSPITALIST

## 2022-01-01 PROCEDURE — 82248 BILIRUBIN DIRECT: CPT | Performed by: NURSE PRACTITIONER

## 2022-01-01 PROCEDURE — 250N000009 HC RX 250: Performed by: RADIOLOGY

## 2022-01-01 PROCEDURE — 82803 BLOOD GASES ANY COMBINATION: CPT | Performed by: INTERNAL MEDICINE

## 2022-01-01 PROCEDURE — 80048 BASIC METABOLIC PNL TOTAL CA: CPT | Performed by: PHYSICIAN ASSISTANT

## 2022-01-01 PROCEDURE — 84157 ASSAY OF PROTEIN OTHER: CPT | Performed by: NURSE PRACTITIONER

## 2022-01-01 PROCEDURE — 87102 FUNGUS ISOLATION CULTURE: CPT | Performed by: NURSE PRACTITIONER

## 2022-01-01 PROCEDURE — 82805 BLOOD GASES W/O2 SATURATION: CPT | Performed by: PEDIATRICS

## 2022-01-01 PROCEDURE — 84484 ASSAY OF TROPONIN QUANT: CPT | Performed by: HOSPITALIST

## 2022-01-01 PROCEDURE — 258N000002 HC RX IP 258 OP 250

## 2022-01-01 PROCEDURE — 85041 AUTOMATED RBC COUNT: CPT | Performed by: PHYSICIAN ASSISTANT

## 2022-01-01 PROCEDURE — 87116 MYCOBACTERIA CULTURE: CPT | Performed by: NURSE PRACTITIONER

## 2022-01-01 PROCEDURE — 0BCM8ZZ EXTIRPATION OF MATTER FROM BILATERAL LUNGS, VIA NATURAL OR ARTIFICIAL OPENING ENDOSCOPIC: ICD-10-PCS | Performed by: INTERNAL MEDICINE

## 2022-01-01 PROCEDURE — 94002 VENT MGMT INPAT INIT DAY: CPT

## 2022-01-01 PROCEDURE — 80048 BASIC METABOLIC PNL TOTAL CA: CPT | Performed by: NURSE PRACTITIONER

## 2022-01-01 PROCEDURE — 250N000009 HC RX 250: Performed by: HOSPITALIST

## 2022-01-01 PROCEDURE — 0W9B3ZZ DRAINAGE OF LEFT PLEURAL CAVITY, PERCUTANEOUS APPROACH: ICD-10-PCS | Performed by: RADIOLOGY

## 2022-01-01 RX ORDER — AMINO AC/PROTEIN HYDR/WHEY PRO 10G-100/30
2 LIQUID (ML) ORAL
Status: DISCONTINUED | OUTPATIENT
Start: 2022-01-01 | End: 2022-01-01

## 2022-01-01 RX ORDER — LIDOCAINE 40 MG/G
CREAM TOPICAL
Status: DISCONTINUED | OUTPATIENT
Start: 2022-01-01 | End: 2022-01-01 | Stop reason: HOSPADM

## 2022-01-01 RX ORDER — POTASSIUM CHLORIDE 20MEQ/15ML
20 LIQUID (ML) ORAL ONCE
Status: COMPLETED | OUTPATIENT
Start: 2022-01-01 | End: 2022-01-01

## 2022-01-01 RX ORDER — IOPAMIDOL 755 MG/ML
135 INJECTION, SOLUTION INTRAVASCULAR ONCE
Status: COMPLETED | OUTPATIENT
Start: 2022-01-01 | End: 2022-01-01

## 2022-01-01 RX ORDER — CHLORHEXIDINE GLUCONATE ORAL RINSE 1.2 MG/ML
15 SOLUTION DENTAL EVERY 12 HOURS
Status: DISCONTINUED | OUTPATIENT
Start: 2022-01-01 | End: 2022-01-01 | Stop reason: HOSPADM

## 2022-01-01 RX ORDER — PROPOFOL 10 MG/ML
INJECTION, EMULSION INTRAVENOUS PRN
Status: DISCONTINUED | OUTPATIENT
Start: 2022-01-01 | End: 2022-01-01

## 2022-01-01 RX ORDER — FUROSEMIDE 10 MG/ML
40 INJECTION INTRAMUSCULAR; INTRAVENOUS ONCE
Status: COMPLETED | OUTPATIENT
Start: 2022-01-01 | End: 2022-01-01

## 2022-01-01 RX ORDER — ALBUMIN (HUMAN) 12.5 G/50ML
12.5 SOLUTION INTRAVENOUS EVERY 6 HOURS
Status: COMPLETED | OUTPATIENT
Start: 2022-01-01 | End: 2022-01-01

## 2022-01-01 RX ORDER — AMINO AC/PROTEIN HYDR/WHEY PRO 10G-100/30
2 LIQUID (ML) ORAL 3 TIMES DAILY
Status: DISCONTINUED | OUTPATIENT
Start: 2022-01-01 | End: 2022-01-01 | Stop reason: HOSPADM

## 2022-01-01 RX ORDER — METOLAZONE 5 MG/1
5 TABLET ORAL ONCE
Status: COMPLETED | OUTPATIENT
Start: 2022-01-01 | End: 2022-01-01

## 2022-01-01 RX ORDER — PROPOFOL 10 MG/ML
5-75 INJECTION, EMULSION INTRAVENOUS CONTINUOUS
Status: DISCONTINUED | OUTPATIENT
Start: 2022-01-01 | End: 2022-01-01

## 2022-01-01 RX ORDER — SALIVA STIMULANT COMB. NO.3
2 SPRAY, NON-AEROSOL (ML) MUCOUS MEMBRANE 4 TIMES DAILY
Status: DISCONTINUED | OUTPATIENT
Start: 2022-01-01 | End: 2022-01-01

## 2022-01-01 RX ORDER — VANCOMYCIN HYDROCHLORIDE 1 G/200ML
1000 INJECTION, SOLUTION INTRAVENOUS EVERY 24 HOURS
Status: DISCONTINUED | OUTPATIENT
Start: 2022-01-01 | End: 2022-01-01

## 2022-01-01 RX ORDER — MEROPENEM 1 G/1
1 INJECTION, POWDER, FOR SOLUTION INTRAVENOUS EVERY 8 HOURS
Status: DISCONTINUED | OUTPATIENT
Start: 2022-01-01 | End: 2022-01-01

## 2022-01-01 RX ORDER — MORPHINE SULFATE 2 MG/ML
1-2 INJECTION, SOLUTION INTRAMUSCULAR; INTRAVENOUS
Status: CANCELLED | OUTPATIENT
Start: 2022-01-01

## 2022-01-01 RX ORDER — POTASSIUM CHLORIDE 1.5 G/1.58G
40 POWDER, FOR SOLUTION ORAL ONCE
Status: COMPLETED | OUTPATIENT
Start: 2022-01-01 | End: 2022-01-01

## 2022-01-01 RX ORDER — DEXTROSE MONOHYDRATE 25 G/50ML
25 INJECTION, SOLUTION INTRAVENOUS ONCE
Status: COMPLETED | OUTPATIENT
Start: 2022-01-01 | End: 2022-01-01

## 2022-01-01 RX ORDER — HEPARIN SODIUM 10000 [USP'U]/100ML
0-5000 INJECTION, SOLUTION INTRAVENOUS CONTINUOUS
Status: DISCONTINUED | OUTPATIENT
Start: 2022-01-01 | End: 2022-01-01

## 2022-01-01 RX ORDER — ASPIRIN 81 MG/1
81 TABLET, CHEWABLE ORAL DAILY
Status: DISCONTINUED | OUTPATIENT
Start: 2022-01-01 | End: 2022-01-01 | Stop reason: HOSPADM

## 2022-01-01 RX ORDER — PROPOFOL 10 MG/ML
INJECTION, EMULSION INTRAVENOUS
Status: COMPLETED
Start: 2022-01-01 | End: 2022-01-01

## 2022-01-01 RX ORDER — FUROSEMIDE 10 MG/ML
10 INJECTION INTRAMUSCULAR; INTRAVENOUS ONCE
Status: COMPLETED | OUTPATIENT
Start: 2022-01-01 | End: 2022-01-01

## 2022-01-01 RX ORDER — NITROGLYCERIN 0.4 MG/1
0.4 TABLET SUBLINGUAL EVERY 5 MIN PRN
Status: DISCONTINUED | OUTPATIENT
Start: 2022-01-01 | End: 2022-01-01 | Stop reason: HOSPADM

## 2022-01-01 RX ORDER — FUROSEMIDE 10 MG/ML
20 INJECTION INTRAMUSCULAR; INTRAVENOUS ONCE
Status: COMPLETED | OUTPATIENT
Start: 2022-01-01 | End: 2022-01-01

## 2022-01-01 RX ORDER — POTASSIUM CHLORIDE 1.5 G/1.58G
40 POWDER, FOR SOLUTION ORAL 2 TIMES DAILY
Status: COMPLETED | OUTPATIENT
Start: 2022-01-01 | End: 2022-01-01

## 2022-01-01 RX ORDER — DEXTROSE MONOHYDRATE 25 G/50ML
25-50 INJECTION, SOLUTION INTRAVENOUS
Status: DISCONTINUED | OUTPATIENT
Start: 2022-01-01 | End: 2022-01-01

## 2022-01-01 RX ORDER — PIPERACILLIN SODIUM, TAZOBACTAM SODIUM 3; .375 G/15ML; G/15ML
3.38 INJECTION, POWDER, LYOPHILIZED, FOR SOLUTION INTRAVENOUS EVERY 6 HOURS
Status: DISCONTINUED | OUTPATIENT
Start: 2022-01-01 | End: 2022-01-01

## 2022-01-01 RX ORDER — NALOXONE HYDROCHLORIDE 0.4 MG/ML
0.4 INJECTION, SOLUTION INTRAMUSCULAR; INTRAVENOUS; SUBCUTANEOUS
Status: DISCONTINUED | OUTPATIENT
Start: 2022-01-01 | End: 2022-01-01 | Stop reason: HOSPADM

## 2022-01-01 RX ORDER — SODIUM CHLORIDE FOR INHALATION 3 %
3 VIAL, NEBULIZER (ML) INHALATION
Status: DISCONTINUED | OUTPATIENT
Start: 2022-01-01 | End: 2022-01-01 | Stop reason: HOSPADM

## 2022-01-01 RX ORDER — MEROPENEM 1 G/1
1 INJECTION, POWDER, FOR SOLUTION INTRAVENOUS EVERY 12 HOURS
Status: DISCONTINUED | OUTPATIENT
Start: 2022-01-01 | End: 2022-01-01

## 2022-01-01 RX ORDER — GUAR GUM
2 PACKET (EA) ORAL 3 TIMES DAILY
Status: DISCONTINUED | OUTPATIENT
Start: 2022-01-01 | End: 2022-01-01 | Stop reason: HOSPADM

## 2022-01-01 RX ORDER — DEXMEDETOMIDINE HYDROCHLORIDE 4 UG/ML
.1-1.2 INJECTION, SOLUTION INTRAVENOUS CONTINUOUS
Status: DISCONTINUED | OUTPATIENT
Start: 2022-01-01 | End: 2022-01-01

## 2022-01-01 RX ORDER — DEXMEDETOMIDINE HYDROCHLORIDE 4 UG/ML
.1-1.2 INJECTION, SOLUTION INTRAVENOUS CONTINUOUS
Status: DISCONTINUED | OUTPATIENT
Start: 2022-01-01 | End: 2022-01-01 | Stop reason: HOSPADM

## 2022-01-01 RX ORDER — ALBUTEROL SULFATE 0.83 MG/ML
2.5 SOLUTION RESPIRATORY (INHALATION)
Status: DISCONTINUED | OUTPATIENT
Start: 2022-01-01 | End: 2022-01-01 | Stop reason: HOSPADM

## 2022-01-01 RX ORDER — PIPERACILLIN SODIUM, TAZOBACTAM SODIUM 4; .5 G/20ML; G/20ML
4.5 INJECTION, POWDER, LYOPHILIZED, FOR SOLUTION INTRAVENOUS EVERY 6 HOURS
Status: DISCONTINUED | OUTPATIENT
Start: 2022-01-01 | End: 2022-01-01

## 2022-01-01 RX ORDER — NYSTATIN 100000 U/G
OINTMENT TOPICAL 2 TIMES DAILY
Status: DISCONTINUED | OUTPATIENT
Start: 2022-01-01 | End: 2022-01-01 | Stop reason: HOSPADM

## 2022-01-01 RX ORDER — MAGNESIUM SULFATE HEPTAHYDRATE 40 MG/ML
2 INJECTION, SOLUTION INTRAVENOUS ONCE
Status: COMPLETED | OUTPATIENT
Start: 2022-01-01 | End: 2022-01-01

## 2022-01-01 RX ORDER — FENTANYL CITRATE 50 UG/ML
50 INJECTION, SOLUTION INTRAMUSCULAR; INTRAVENOUS
Status: DISCONTINUED | OUTPATIENT
Start: 2022-01-01 | End: 2022-01-01 | Stop reason: HOSPADM

## 2022-01-01 RX ORDER — NALOXONE HYDROCHLORIDE 0.4 MG/ML
0.2 INJECTION, SOLUTION INTRAMUSCULAR; INTRAVENOUS; SUBCUTANEOUS
Status: CANCELLED | OUTPATIENT
Start: 2022-01-01

## 2022-01-01 RX ORDER — LIDOCAINE 40 MG/G
CREAM TOPICAL
Status: CANCELLED | OUTPATIENT
Start: 2022-01-01

## 2022-01-01 RX ORDER — GLYCOPYRROLATE 0.2 MG/ML
0.2 INJECTION, SOLUTION INTRAMUSCULAR; INTRAVENOUS ONCE
Status: COMPLETED | OUTPATIENT
Start: 2022-01-01 | End: 2022-01-01

## 2022-01-01 RX ORDER — POTASSIUM CHLORIDE 1.5 G/1.58G
20 POWDER, FOR SOLUTION ORAL ONCE
Status: COMPLETED | OUTPATIENT
Start: 2022-01-01 | End: 2022-01-01

## 2022-01-01 RX ORDER — AMPICILLIN 2 G/1
2 INJECTION, POWDER, FOR SOLUTION INTRAVENOUS EVERY 6 HOURS
Status: DISCONTINUED | OUTPATIENT
Start: 2022-01-01 | End: 2022-01-01

## 2022-01-01 RX ORDER — POTASSIUM CHLORIDE 1.5 G/1.58G
40 POWDER, FOR SOLUTION ORAL 2 TIMES DAILY
Status: DISCONTINUED | OUTPATIENT
Start: 2022-01-01 | End: 2022-01-01

## 2022-01-01 RX ORDER — OXYCODONE HCL 5 MG/5 ML
10 SOLUTION, ORAL ORAL EVERY 4 HOURS PRN
Status: DISCONTINUED | OUTPATIENT
Start: 2022-01-01 | End: 2022-01-01

## 2022-01-01 RX ORDER — NALOXONE HYDROCHLORIDE 0.4 MG/ML
0.2 INJECTION, SOLUTION INTRAMUSCULAR; INTRAVENOUS; SUBCUTANEOUS
Status: DISCONTINUED | OUTPATIENT
Start: 2022-01-01 | End: 2022-01-01 | Stop reason: HOSPADM

## 2022-01-01 RX ORDER — NALOXONE HYDROCHLORIDE 0.4 MG/ML
0.1 INJECTION, SOLUTION INTRAMUSCULAR; INTRAVENOUS; SUBCUTANEOUS
Status: CANCELLED | OUTPATIENT
Start: 2022-01-01

## 2022-01-01 RX ORDER — FUROSEMIDE 10 MG/ML
20 INJECTION INTRAMUSCULAR; INTRAVENOUS EVERY 12 HOURS
Status: COMPLETED | OUTPATIENT
Start: 2022-01-01 | End: 2022-01-01

## 2022-01-01 RX ORDER — LOPERAMIDE HCL 2 MG
2 CAPSULE ORAL 4 TIMES DAILY PRN
Status: DISCONTINUED | OUTPATIENT
Start: 2022-01-01 | End: 2022-01-01

## 2022-01-01 RX ORDER — OXYCODONE HCL 5 MG/5 ML
7.5 SOLUTION, ORAL ORAL EVERY 6 HOURS PRN
Status: DISCONTINUED | OUTPATIENT
Start: 2022-01-01 | End: 2022-01-01

## 2022-01-01 RX ORDER — NICOTINE POLACRILEX 4 MG
15-30 LOZENGE BUCCAL
Status: DISCONTINUED | OUTPATIENT
Start: 2022-01-01 | End: 2022-01-01

## 2022-01-01 RX ORDER — LIDOCAINE HYDROCHLORIDE 10 MG/ML
5 INJECTION, SOLUTION EPIDURAL; INFILTRATION; INTRACAUDAL; PERINEURAL ONCE
Status: DISCONTINUED | OUTPATIENT
Start: 2022-01-01 | End: 2022-01-01 | Stop reason: HOSPADM

## 2022-01-01 RX ORDER — POTASSIUM CHLORIDE 20MEQ/15ML
40 LIQUID (ML) ORAL ONCE
Status: COMPLETED | OUTPATIENT
Start: 2022-01-01 | End: 2022-01-01

## 2022-01-01 RX ORDER — HEPARIN SODIUM 10000 [USP'U]/100ML
0-5000 INJECTION, SOLUTION INTRAVENOUS CONTINUOUS
Status: DISCONTINUED | OUTPATIENT
Start: 2022-01-01 | End: 2022-01-01 | Stop reason: HOSPADM

## 2022-01-01 RX ORDER — ACETYLCYSTEINE 200 MG/ML
1 SOLUTION ORAL; RESPIRATORY (INHALATION) EVERY 6 HOURS
Status: DISCONTINUED | OUTPATIENT
Start: 2022-01-01 | End: 2022-01-01

## 2022-01-01 RX ORDER — NOREPINEPHRINE BITARTRATE 0.02 MG/ML
INJECTION, SOLUTION INTRAVENOUS
Status: DISPENSED
Start: 2022-01-01 | End: 2022-01-01

## 2022-01-01 RX ORDER — FENTANYL CITRATE 50 UG/ML
50 INJECTION, SOLUTION INTRAMUSCULAR; INTRAVENOUS EVERY 10 MIN PRN
Status: DISCONTINUED | OUTPATIENT
Start: 2022-01-01 | End: 2022-01-01 | Stop reason: HOSPADM

## 2022-01-01 RX ORDER — FUROSEMIDE 10 MG/ML
20 INJECTION INTRAMUSCULAR; INTRAVENOUS EVERY 12 HOURS
Status: DISCONTINUED | OUTPATIENT
Start: 2022-01-01 | End: 2022-01-01

## 2022-01-01 RX ORDER — FUROSEMIDE 10 MG/ML
80 INJECTION INTRAMUSCULAR; INTRAVENOUS ONCE
Status: COMPLETED | OUTPATIENT
Start: 2022-01-01 | End: 2022-01-01

## 2022-01-01 RX ORDER — BACLOFEN 10 MG/1
10 TABLET ORAL 3 TIMES DAILY PRN
Status: DISCONTINUED | OUTPATIENT
Start: 2022-01-01 | End: 2022-01-01 | Stop reason: HOSPADM

## 2022-01-01 RX ORDER — FUROSEMIDE 10 MG/ML
40 INJECTION INTRAMUSCULAR; INTRAVENOUS ONCE
Status: DISCONTINUED | OUTPATIENT
Start: 2022-01-01 | End: 2022-01-01

## 2022-01-01 RX ORDER — SODIUM CHLORIDE 450 MG/100ML
INJECTION, SOLUTION INTRAVENOUS CONTINUOUS
Status: DISCONTINUED | OUTPATIENT
Start: 2022-01-01 | End: 2022-01-01 | Stop reason: HOSPADM

## 2022-01-01 RX ORDER — FENTANYL CITRATE 50 UG/ML
100 INJECTION, SOLUTION INTRAMUSCULAR; INTRAVENOUS ONCE
Status: COMPLETED | OUTPATIENT
Start: 2022-01-01 | End: 2022-01-01

## 2022-01-01 RX ORDER — DULOXETIN HYDROCHLORIDE 60 MG/1
60 CAPSULE, DELAYED RELEASE ORAL 2 TIMES DAILY
Status: DISCONTINUED | OUTPATIENT
Start: 2022-01-01 | End: 2022-01-01 | Stop reason: HOSPADM

## 2022-01-01 RX ORDER — FUROSEMIDE 10 MG/ML
INJECTION INTRAMUSCULAR; INTRAVENOUS
Status: COMPLETED
Start: 2022-01-01 | End: 2022-01-01

## 2022-01-01 RX ORDER — METOPROLOL TARTRATE 25 MG/1
25 TABLET, FILM COATED ORAL 2 TIMES DAILY
Status: DISCONTINUED | OUTPATIENT
Start: 2022-01-01 | End: 2022-01-01 | Stop reason: HOSPADM

## 2022-01-01 RX ORDER — IOPAMIDOL 755 MG/ML
124 INJECTION, SOLUTION INTRAVASCULAR ONCE
Status: COMPLETED | OUTPATIENT
Start: 2022-01-01 | End: 2022-01-01

## 2022-01-01 RX ORDER — NOREPINEPHRINE BITARTRATE 0.02 MG/ML
.01-.6 INJECTION, SOLUTION INTRAVENOUS CONTINUOUS
Status: DISCONTINUED | OUTPATIENT
Start: 2022-01-01 | End: 2022-01-01

## 2022-01-01 RX ORDER — MIDAZOLAM HCL IN 0.9 % NACL/PF 1 MG/ML
1-8 PLASTIC BAG, INJECTION (ML) INTRAVENOUS CONTINUOUS
Status: DISCONTINUED | OUTPATIENT
Start: 2022-01-01 | End: 2022-01-01

## 2022-01-01 RX ORDER — SODIUM CHLORIDE FOR INHALATION 3 %
3 VIAL, NEBULIZER (ML) INHALATION EVERY 6 HOURS
Status: DISCONTINUED | OUTPATIENT
Start: 2022-01-01 | End: 2022-01-01

## 2022-01-01 RX ORDER — LACTOBACILLUS RHAMNOSUS GG 10B CELL
1 CAPSULE ORAL 2 TIMES DAILY
Status: DISCONTINUED | OUTPATIENT
Start: 2022-01-01 | End: 2022-01-01 | Stop reason: HOSPADM

## 2022-01-01 RX ORDER — LEVALBUTEROL INHALATION SOLUTION 0.31 MG/3ML
0.63 SOLUTION RESPIRATORY (INHALATION) EVERY 4 HOURS PRN
Status: DISCONTINUED | OUTPATIENT
Start: 2022-01-01 | End: 2022-01-01 | Stop reason: CLARIF

## 2022-01-01 RX ORDER — SODIUM CHLORIDE 9 MG/ML
INJECTION, SOLUTION INTRAVENOUS CONTINUOUS
Status: DISCONTINUED | OUTPATIENT
Start: 2022-01-01 | End: 2022-01-01

## 2022-01-01 RX ORDER — PIPERACILLIN SODIUM, TAZOBACTAM SODIUM 4; .5 G/20ML; G/20ML
4.5 INJECTION, POWDER, LYOPHILIZED, FOR SOLUTION INTRAVENOUS EVERY 6 HOURS
Status: DISCONTINUED | OUTPATIENT
Start: 2022-01-01 | End: 2022-01-01 | Stop reason: HOSPADM

## 2022-01-01 RX ORDER — POTASSIUM CHLORIDE 20MEQ/15ML
20 LIQUID (ML) ORAL 2 TIMES DAILY
Status: COMPLETED | OUTPATIENT
Start: 2022-01-01 | End: 2022-01-01

## 2022-01-01 RX ORDER — POTASSIUM CHLORIDE 20MEQ/15ML
40 LIQUID (ML) ORAL AT BEDTIME
Status: DISCONTINUED | OUTPATIENT
Start: 2022-01-01 | End: 2022-01-01 | Stop reason: HOSPADM

## 2022-01-01 RX ORDER — FUROSEMIDE 10 MG/ML
40 INJECTION INTRAMUSCULAR; INTRAVENOUS EVERY 12 HOURS
Status: DISCONTINUED | OUTPATIENT
Start: 2022-01-01 | End: 2022-01-01

## 2022-01-01 RX ORDER — LOSARTAN POTASSIUM AND HYDROCHLOROTHIAZIDE 12.5; 5 MG/1; MG/1
1 TABLET ORAL DAILY
Status: DISCONTINUED | OUTPATIENT
Start: 2022-01-01 | End: 2022-01-01

## 2022-01-01 RX ORDER — SIMETHICONE 80 MG
80 TABLET,CHEWABLE ORAL EVERY 6 HOURS PRN
Status: DISCONTINUED | OUTPATIENT
Start: 2022-01-01 | End: 2022-01-01

## 2022-01-01 RX ORDER — FENTANYL CITRATE 50 UG/ML
50 INJECTION, SOLUTION INTRAMUSCULAR; INTRAVENOUS EVERY 30 MIN PRN
Status: DISCONTINUED | OUTPATIENT
Start: 2022-01-01 | End: 2022-01-01 | Stop reason: HOSPADM

## 2022-01-01 RX ORDER — GLYCOPYRROLATE 0.2 MG/ML
0.1 INJECTION, SOLUTION INTRAMUSCULAR; INTRAVENOUS EVERY 4 HOURS PRN
Status: DISCONTINUED | OUTPATIENT
Start: 2022-01-01 | End: 2022-01-01 | Stop reason: HOSPADM

## 2022-01-01 RX ORDER — LEVALBUTEROL 1.25 MG/.5ML
0.63 SOLUTION, CONCENTRATE RESPIRATORY (INHALATION) EVERY 4 HOURS PRN
Status: DISCONTINUED | OUTPATIENT
Start: 2022-01-01 | End: 2022-01-01 | Stop reason: HOSPADM

## 2022-01-01 RX ORDER — LORAZEPAM 2 MG/ML
2 INJECTION INTRAMUSCULAR ONCE
Status: COMPLETED | OUTPATIENT
Start: 2022-01-01 | End: 2022-01-01

## 2022-01-01 RX ORDER — OXYCODONE HCL 5 MG/5 ML
5-10 SOLUTION, ORAL ORAL EVERY 4 HOURS PRN
Status: DISCONTINUED | OUTPATIENT
Start: 2022-01-01 | End: 2022-01-01 | Stop reason: HOSPADM

## 2022-01-01 RX ORDER — NOREPINEPHRINE BITARTRATE 0.02 MG/ML
.01-.6 INJECTION, SOLUTION INTRAVENOUS CONTINUOUS
Status: DISCONTINUED | OUTPATIENT
Start: 2022-01-01 | End: 2022-01-01 | Stop reason: HOSPADM

## 2022-01-01 RX ADMIN — INSULIN ASPART 1 UNITS: 100 INJECTION, SOLUTION INTRAVENOUS; SUBCUTANEOUS at 12:43

## 2022-01-01 RX ADMIN — Medication 2 SPRAY: at 08:16

## 2022-01-01 RX ADMIN — DEXMEDETOMIDINE HYDROCHLORIDE 0.6 MCG/KG/HR: 400 INJECTION INTRAVENOUS at 20:55

## 2022-01-01 RX ADMIN — OXYCODONE HYDROCHLORIDE 10 MG: 5 SOLUTION ORAL at 23:10

## 2022-01-01 RX ADMIN — EPOPROSTENOL 20 NG/KG/MIN: 1.5 INJECTION, POWDER, LYOPHILIZED, FOR SOLUTION INTRAVENOUS at 05:47

## 2022-01-01 RX ADMIN — FUROSEMIDE 20 MG: 10 INJECTION, SOLUTION INTRAVENOUS at 01:24

## 2022-01-01 RX ADMIN — HEPARIN SODIUM 1800 UNITS/HR: 1000 INJECTION INTRAVENOUS; SUBCUTANEOUS at 04:03

## 2022-01-01 RX ADMIN — Medication 2 PACKET: at 22:00

## 2022-01-01 RX ADMIN — ALBUTEROL SULFATE 2.5 MG: 2.5 SOLUTION RESPIRATORY (INHALATION) at 14:13

## 2022-01-01 RX ADMIN — CHLORHEXIDINE GLUCONATE 15 ML: 1.2 SOLUTION ORAL at 20:36

## 2022-01-01 RX ADMIN — NYSTATIN: 100000 OINTMENT TOPICAL at 22:06

## 2022-01-01 RX ADMIN — ACETYLCYSTEINE 1 ML: 200 SOLUTION ORAL; RESPIRATORY (INHALATION) at 19:53

## 2022-01-01 RX ADMIN — INSULIN ASPART 1 UNITS: 100 INJECTION, SOLUTION INTRAVENOUS; SUBCUTANEOUS at 08:03

## 2022-01-01 RX ADMIN — LORAZEPAM 2 MG: 2 INJECTION INTRAMUSCULAR; INTRAVENOUS at 16:49

## 2022-01-01 RX ADMIN — AMIODARONE HYDROCHLORIDE 400 MG: 200 TABLET ORAL at 09:31

## 2022-01-01 RX ADMIN — DEXMEDETOMIDINE HYDROCHLORIDE 0.7 MCG/KG/HR: 400 INJECTION INTRAVENOUS at 12:17

## 2022-01-01 RX ADMIN — SENNOSIDES 5 ML: 8.8 LIQUID ORAL at 20:54

## 2022-01-01 RX ADMIN — Medication 2 PACKET: at 21:33

## 2022-01-01 RX ADMIN — FENTANYL CITRATE 50 MCG/HR: 50 INJECTION INTRAVENOUS at 08:02

## 2022-01-01 RX ADMIN — DEXTROSE MONOHYDRATE 25 G: 25 INJECTION, SOLUTION INTRAVENOUS at 23:09

## 2022-01-01 RX ADMIN — BACLOFEN 10 MG: 10 TABLET ORAL at 09:23

## 2022-01-01 RX ADMIN — Medication 2 SPRAY: at 08:56

## 2022-01-01 RX ADMIN — ACETYLCYSTEINE 1 ML: 200 SOLUTION ORAL; RESPIRATORY (INHALATION) at 19:37

## 2022-01-01 RX ADMIN — HUMAN ALBUMIN MICROSPHERES AND PERFLUTREN 9 ML: 10; .22 INJECTION, SOLUTION INTRAVENOUS at 14:12

## 2022-01-01 RX ADMIN — Medication 40 MG: at 08:51

## 2022-01-01 RX ADMIN — POLYETHYLENE GLYCOL 3350 17 G: 17 POWDER, FOR SOLUTION ORAL at 09:10

## 2022-01-01 RX ADMIN — PIPERACILLIN SODIUM AND TAZOBACTAM SODIUM 4.5 G: 4; .5 INJECTION, POWDER, LYOPHILIZED, FOR SOLUTION INTRAVENOUS at 03:11

## 2022-01-01 RX ADMIN — CHLORHEXIDINE GLUCONATE 15 ML: 1.2 SOLUTION ORAL at 08:39

## 2022-01-01 RX ADMIN — EPOPROSTENOL 20 NG/KG/MIN: 1.5 INJECTION, POWDER, LYOPHILIZED, FOR SOLUTION INTRAVENOUS at 12:11

## 2022-01-01 RX ADMIN — CHLORHEXIDINE GLUCONATE 15 ML: 1.2 SOLUTION ORAL at 20:05

## 2022-01-01 RX ADMIN — INSULIN ASPART 1 UNITS: 100 INJECTION, SOLUTION INTRAVENOUS; SUBCUTANEOUS at 00:38

## 2022-01-01 RX ADMIN — OXYCODONE HYDROCHLORIDE 10 MG: 5 SOLUTION ORAL at 00:13

## 2022-01-01 RX ADMIN — MAGNESIUM SULFATE HEPTAHYDRATE 2 G: 40 INJECTION, SOLUTION INTRAVENOUS at 20:54

## 2022-01-01 RX ADMIN — AMIODARONE HYDROCHLORIDE 400 MG: 200 TABLET ORAL at 08:47

## 2022-01-01 RX ADMIN — ACETYLCYSTEINE 1 ML: 200 SOLUTION ORAL; RESPIRATORY (INHALATION) at 08:25

## 2022-01-01 RX ADMIN — DEXMEDETOMIDINE HYDROCHLORIDE 0.6 MCG/KG/HR: 400 INJECTION INTRAVENOUS at 12:17

## 2022-01-01 RX ADMIN — HYDROCORTISONE SODIUM SUCCINATE 50 MG: 100 INJECTION, POWDER, FOR SOLUTION INTRAMUSCULAR; INTRAVENOUS at 14:19

## 2022-01-01 RX ADMIN — IOPAMIDOL 135 ML: 755 INJECTION, SOLUTION INTRAVENOUS at 17:03

## 2022-01-01 RX ADMIN — POTASSIUM CHLORIDE 40 MEQ: 1.5 POWDER, FOR SOLUTION ORAL at 09:02

## 2022-01-01 RX ADMIN — METOLAZONE 5 MG: 5 TABLET ORAL at 08:40

## 2022-01-01 RX ADMIN — FENTANYL CITRATE 50 MCG/HR: 50 INJECTION INTRAVENOUS at 14:05

## 2022-01-01 RX ADMIN — INSULIN ASPART 1 UNITS: 100 INJECTION, SOLUTION INTRAVENOUS; SUBCUTANEOUS at 11:56

## 2022-01-01 RX ADMIN — VASOPRESSIN 1.2 UNITS/HR: 20 INJECTION INTRAVENOUS at 23:45

## 2022-01-01 RX ADMIN — FUROSEMIDE 10 MG: 10 INJECTION, SOLUTION INTRAVENOUS at 08:59

## 2022-01-01 RX ADMIN — CHLORHEXIDINE GLUCONATE 15 ML: 1.2 SOLUTION ORAL at 08:44

## 2022-01-01 RX ADMIN — ACETYLCYSTEINE 1 ML: 200 SOLUTION ORAL; RESPIRATORY (INHALATION) at 19:56

## 2022-01-01 RX ADMIN — ALBUTEROL SULFATE 2.5 MG: 2.5 SOLUTION RESPIRATORY (INHALATION) at 19:28

## 2022-01-01 RX ADMIN — Medication 2 PACKET: at 00:19

## 2022-01-01 RX ADMIN — Medication 1 CAPSULE: at 08:22

## 2022-01-01 RX ADMIN — LOSARTAN POTASSIUM AND HYDROCHLOROTHIAZIDE 1 TABLET: 50; 12.5 TABLET, FILM COATED ORAL at 13:45

## 2022-01-01 RX ADMIN — POTASSIUM CHLORIDE 40 MEQ: 20 SOLUTION ORAL at 21:57

## 2022-01-01 RX ADMIN — HEPARIN SODIUM 2400 UNITS/HR: 1000 INJECTION INTRAVENOUS; SUBCUTANEOUS at 06:53

## 2022-01-01 RX ADMIN — ASPIRIN 81 MG CHEWABLE TABLET 81 MG: 81 TABLET CHEWABLE at 08:47

## 2022-01-01 RX ADMIN — MULTIVIT AND MINERALS-FERROUS GLUCONATE 9 MG IRON/15 ML ORAL LIQUID 15 ML: at 08:14

## 2022-01-01 RX ADMIN — MIDAZOLAM HYDROCHLORIDE 6 MG/HR: 1 INJECTION, SOLUTION INTRAVENOUS at 13:45

## 2022-01-01 RX ADMIN — ACETYLCYSTEINE 1 ML: 200 SOLUTION ORAL; RESPIRATORY (INHALATION) at 12:19

## 2022-01-01 RX ADMIN — ALBUTEROL SULFATE 2.5 MG: 2.5 SOLUTION RESPIRATORY (INHALATION) at 20:05

## 2022-01-01 RX ADMIN — ALBUTEROL SULFATE 2.5 MG: 2.5 SOLUTION RESPIRATORY (INHALATION) at 07:47

## 2022-01-01 RX ADMIN — PROPOFOL 50 MCG/KG/MIN: 10 INJECTION, EMULSION INTRAVENOUS at 15:00

## 2022-01-01 RX ADMIN — Medication 2 PACKET: at 21:50

## 2022-01-01 RX ADMIN — Medication 2 PACKET: at 22:46

## 2022-01-01 RX ADMIN — HEPARIN SODIUM 5000 UNITS: 5000 INJECTION INTRAVENOUS; SUBCUTANEOUS at 08:55

## 2022-01-01 RX ADMIN — Medication 2 PACKET: at 15:36

## 2022-01-01 RX ADMIN — Medication 2 PACKET: at 08:41

## 2022-01-01 RX ADMIN — ASPIRIN 81 MG CHEWABLE TABLET 81 MG: 81 TABLET CHEWABLE at 08:07

## 2022-01-01 RX ADMIN — AMIODARONE HYDROCHLORIDE 400 MG: 200 TABLET ORAL at 20:51

## 2022-01-01 RX ADMIN — PROPOFOL 50 MCG/KG/MIN: 10 INJECTION, EMULSION INTRAVENOUS at 23:08

## 2022-01-01 RX ADMIN — METOPROLOL TARTRATE 25 MG: 25 TABLET, FILM COATED ORAL at 20:04

## 2022-01-01 RX ADMIN — SODIUM CHLORIDE SOLN NEBU 3% 3 ML: 3 NEBU SOLN at 08:25

## 2022-01-01 RX ADMIN — EPOPROSTENOL 20 NG/KG/MIN: 1.5 INJECTION, POWDER, LYOPHILIZED, FOR SOLUTION INTRAVENOUS at 18:52

## 2022-01-01 RX ADMIN — PIPERACILLIN SODIUM AND TAZOBACTAM SODIUM 4.5 G: 4; .5 INJECTION, POWDER, LYOPHILIZED, FOR SOLUTION INTRAVENOUS at 23:00

## 2022-01-01 RX ADMIN — ACETYLCYSTEINE 1 ML: 200 SOLUTION ORAL; RESPIRATORY (INHALATION) at 20:26

## 2022-01-01 RX ADMIN — ALBUTEROL SULFATE 2.5 MG: 2.5 SOLUTION RESPIRATORY (INHALATION) at 03:21

## 2022-01-01 RX ADMIN — ALBUTEROL SULFATE 2.5 MG: 2.5 SOLUTION RESPIRATORY (INHALATION) at 19:09

## 2022-01-01 RX ADMIN — PIPERACILLIN AND TAZOBACTAM 4.5 G: 4; .5 INJECTION, POWDER, FOR SOLUTION INTRAVENOUS at 15:01

## 2022-01-01 RX ADMIN — PIPERACILLIN SODIUM AND TAZOBACTAM SODIUM 4.5 G: 4; .5 INJECTION, POWDER, LYOPHILIZED, FOR SOLUTION INTRAVENOUS at 08:47

## 2022-01-01 RX ADMIN — PIPERACILLIN AND TAZOBACTAM 4.5 G: 4; .5 INJECTION, POWDER, FOR SOLUTION INTRAVENOUS at 08:38

## 2022-01-01 RX ADMIN — Medication 1 CAPSULE: at 09:37

## 2022-01-01 RX ADMIN — NYSTATIN: 100000 OINTMENT TOPICAL at 21:44

## 2022-01-01 RX ADMIN — HEPARIN SODIUM 5000 UNITS: 5000 INJECTION INTRAVENOUS; SUBCUTANEOUS at 16:07

## 2022-01-01 RX ADMIN — CHLORHEXIDINE GLUCONATE 15 ML: 1.2 SOLUTION ORAL at 08:03

## 2022-01-01 RX ADMIN — NYSTATIN: 100000 OINTMENT TOPICAL at 12:07

## 2022-01-01 RX ADMIN — HEPARIN SODIUM 1350 UNITS/HR: 1000 INJECTION INTRAVENOUS; SUBCUTANEOUS at 02:00

## 2022-01-01 RX ADMIN — CHLORHEXIDINE GLUCONATE 15 ML: 1.2 SOLUTION ORAL at 21:13

## 2022-01-01 RX ADMIN — OXYCODONE HYDROCHLORIDE 10 MG: 5 SOLUTION ORAL at 18:37

## 2022-01-01 RX ADMIN — FUROSEMIDE 40 MG: 10 INJECTION, SOLUTION INTRAVENOUS at 21:34

## 2022-01-01 RX ADMIN — PIPERACILLIN AND TAZOBACTAM 4.5 G: 4; .5 INJECTION, POWDER, FOR SOLUTION INTRAVENOUS at 14:02

## 2022-01-01 RX ADMIN — CHLORHEXIDINE GLUCONATE 15 ML: 1.2 SOLUTION ORAL at 19:31

## 2022-01-01 RX ADMIN — EPOPROSTENOL 20 NG/KG/MIN: 1.5 INJECTION, POWDER, LYOPHILIZED, FOR SOLUTION INTRAVENOUS at 00:01

## 2022-01-01 RX ADMIN — Medication 2 SPRAY: at 17:24

## 2022-01-01 RX ADMIN — ACETYLCYSTEINE 1 ML: 200 SOLUTION ORAL; RESPIRATORY (INHALATION) at 08:58

## 2022-01-01 RX ADMIN — MICONAZOLE NITRATE: 2 POWDER TOPICAL at 09:10

## 2022-01-01 RX ADMIN — HEPARIN SODIUM 5000 UNITS: 5000 INJECTION INTRAVENOUS; SUBCUTANEOUS at 00:16

## 2022-01-01 RX ADMIN — AMIODARONE HYDROCHLORIDE 400 MG: 200 TABLET ORAL at 08:44

## 2022-01-01 RX ADMIN — POLYETHYLENE GLYCOL 3350 17 G: 17 POWDER, FOR SOLUTION ORAL at 09:21

## 2022-01-01 RX ADMIN — HEPARIN SODIUM 2100 UNITS/HR: 1000 INJECTION INTRAVENOUS; SUBCUTANEOUS at 16:16

## 2022-01-01 RX ADMIN — OXYCODONE HYDROCHLORIDE 10 MG: 5 SOLUTION ORAL at 09:58

## 2022-01-01 RX ADMIN — OXYCODONE HYDROCHLORIDE 10 MG: 5 SOLUTION ORAL at 14:22

## 2022-01-01 RX ADMIN — EPOPROSTENOL 20 NG/KG/MIN: 1.5 INJECTION, POWDER, LYOPHILIZED, FOR SOLUTION INTRAVENOUS at 00:27

## 2022-01-01 RX ADMIN — MULTIVIT AND MINERALS-FERROUS GLUCONATE 9 MG IRON/15 ML ORAL LIQUID 15 ML: at 08:51

## 2022-01-01 RX ADMIN — Medication 2 PACKET: at 09:24

## 2022-01-01 RX ADMIN — HEPARIN SODIUM 2550 UNITS/HR: 1000 INJECTION INTRAVENOUS; SUBCUTANEOUS at 15:43

## 2022-01-01 RX ADMIN — Medication 1 CAPSULE: at 22:33

## 2022-01-01 RX ADMIN — DEXMEDETOMIDINE HYDROCHLORIDE 0.5 MCG/KG/HR: 400 INJECTION INTRAVENOUS at 13:55

## 2022-01-01 RX ADMIN — PROPOFOL 50 MCG/KG/MIN: 10 INJECTION, EMULSION INTRAVENOUS at 08:59

## 2022-01-01 RX ADMIN — AMIODARONE HYDROCHLORIDE 400 MG: 200 TABLET ORAL at 09:10

## 2022-01-01 RX ADMIN — Medication 40 MG: at 06:59

## 2022-01-01 RX ADMIN — PIPERACILLIN AND TAZOBACTAM 4.5 G: 4; .5 INJECTION, POWDER, FOR SOLUTION INTRAVENOUS at 02:00

## 2022-01-01 RX ADMIN — ACETYLCYSTEINE 1 ML: 200 SOLUTION ORAL; RESPIRATORY (INHALATION) at 23:51

## 2022-01-01 RX ADMIN — ASPIRIN 81 MG CHEWABLE TABLET 81 MG: 81 TABLET CHEWABLE at 08:48

## 2022-01-01 RX ADMIN — INSULIN ASPART 1 UNITS: 100 INJECTION, SOLUTION INTRAVENOUS; SUBCUTANEOUS at 12:35

## 2022-01-01 RX ADMIN — PROPOFOL 15 MCG/KG/MIN: 10 INJECTION, EMULSION INTRAVENOUS at 17:44

## 2022-01-01 RX ADMIN — PIPERACILLIN AND TAZOBACTAM 4.5 G: 4; .5 INJECTION, POWDER, FOR SOLUTION INTRAVENOUS at 20:00

## 2022-01-01 RX ADMIN — AMIODARONE HYDROCHLORIDE 400 MG: 200 TABLET ORAL at 08:23

## 2022-01-01 RX ADMIN — PIPERACILLIN SODIUM AND TAZOBACTAM SODIUM 3.38 G: 3; .375 INJECTION, POWDER, LYOPHILIZED, FOR SOLUTION INTRAVENOUS at 14:35

## 2022-01-01 RX ADMIN — MULTIVIT AND MINERALS-FERROUS GLUCONATE 9 MG IRON/15 ML ORAL LIQUID 15 ML: at 08:59

## 2022-01-01 RX ADMIN — OXYCODONE HYDROCHLORIDE 10 MG: 5 SOLUTION ORAL at 11:48

## 2022-01-01 RX ADMIN — LIDOCAINE HYDROCHLORIDE 1 ML: 10 INJECTION, SOLUTION INFILTRATION; PERINEURAL at 09:28

## 2022-01-01 RX ADMIN — INSULIN ASPART 1 UNITS: 100 INJECTION, SOLUTION INTRAVENOUS; SUBCUTANEOUS at 19:31

## 2022-01-01 RX ADMIN — AMPICILLIN SODIUM 2 G: 2 INJECTION, POWDER, FOR SOLUTION INTRAVENOUS at 12:03

## 2022-01-01 RX ADMIN — ALBUTEROL SULFATE 2.5 MG: 2.5 SOLUTION RESPIRATORY (INHALATION) at 01:04

## 2022-01-01 RX ADMIN — METOPROLOL TARTRATE 25 MG: 25 TABLET, FILM COATED ORAL at 21:13

## 2022-01-01 RX ADMIN — Medication 2 PACKET: at 16:41

## 2022-01-01 RX ADMIN — CHLORHEXIDINE GLUCONATE 15 ML: 1.2 SOLUTION ORAL at 20:02

## 2022-01-01 RX ADMIN — PIPERACILLIN AND TAZOBACTAM 4.5 G: 4; .5 INJECTION, POWDER, FOR SOLUTION INTRAVENOUS at 20:04

## 2022-01-01 RX ADMIN — PIPERACILLIN AND TAZOBACTAM 4.5 G: 4; .5 INJECTION, POWDER, FOR SOLUTION INTRAVENOUS at 02:34

## 2022-01-01 RX ADMIN — MULTIVIT AND MINERALS-FERROUS GLUCONATE 9 MG IRON/15 ML ORAL LIQUID 15 ML: at 09:22

## 2022-01-01 RX ADMIN — ALBUTEROL SULFATE 2.5 MG: 2.5 SOLUTION RESPIRATORY (INHALATION) at 08:24

## 2022-01-01 RX ADMIN — Medication 1 CAPSULE: at 21:37

## 2022-01-01 RX ADMIN — PIPERACILLIN AND TAZOBACTAM 4.5 G: 4; .5 INJECTION, POWDER, FOR SOLUTION INTRAVENOUS at 07:50

## 2022-01-01 RX ADMIN — ALBUTEROL SULFATE 2.5 MG: 2.5 SOLUTION RESPIRATORY (INHALATION) at 00:06

## 2022-01-01 RX ADMIN — PROPOFOL 25 MCG/KG/MIN: 10 INJECTION, EMULSION INTRAVENOUS at 08:48

## 2022-01-01 RX ADMIN — Medication 1 CAPSULE: at 08:47

## 2022-01-01 RX ADMIN — NOREPINEPHRINE BITARTRATE 4 MG/250 ML (16 MCG/ML) IN 0.9 % NACL IV 0.12 MCG/KG/MIN: at 06:39

## 2022-01-01 RX ADMIN — SENNOSIDES 5 ML: 8.8 LIQUID ORAL at 09:21

## 2022-01-01 RX ADMIN — Medication 2 PACKET: at 06:10

## 2022-01-01 RX ADMIN — ALBUTEROL SULFATE 2.5 MG: 2.5 SOLUTION RESPIRATORY (INHALATION) at 18:54

## 2022-01-01 RX ADMIN — ASPIRIN 81 MG CHEWABLE TABLET 81 MG: 81 TABLET CHEWABLE at 09:11

## 2022-01-01 RX ADMIN — METOPROLOL TARTRATE 25 MG: 25 TABLET, FILM COATED ORAL at 08:11

## 2022-01-01 RX ADMIN — CHLORHEXIDINE GLUCONATE 15 ML: 1.2 SOLUTION ORAL at 20:03

## 2022-01-01 RX ADMIN — OXYCODONE HYDROCHLORIDE 10 MG: 5 SOLUTION ORAL at 16:43

## 2022-01-01 RX ADMIN — FUROSEMIDE 40 MG: 10 INJECTION, SOLUTION INTRAVENOUS at 08:55

## 2022-01-01 RX ADMIN — CHLORHEXIDINE GLUCONATE 15 ML: 1.2 SOLUTION ORAL at 08:09

## 2022-01-01 RX ADMIN — SODIUM CHLORIDE: 4.5 INJECTION, SOLUTION INTRAVENOUS at 12:36

## 2022-01-01 RX ADMIN — HEPARIN SODIUM 5000 UNITS: 5000 INJECTION INTRAVENOUS; SUBCUTANEOUS at 08:23

## 2022-01-01 RX ADMIN — Medication 40 MG: at 08:03

## 2022-01-01 RX ADMIN — FUROSEMIDE 10 MG: 10 INJECTION, SOLUTION INTRAVENOUS at 08:40

## 2022-01-01 RX ADMIN — PIPERACILLIN AND TAZOBACTAM 4.5 G: 4; .5 INJECTION, POWDER, FOR SOLUTION INTRAVENOUS at 15:11

## 2022-01-01 RX ADMIN — AMIODARONE HYDROCHLORIDE 400 MG: 200 TABLET ORAL at 08:17

## 2022-01-01 RX ADMIN — NYSTATIN: 100000 OINTMENT TOPICAL at 10:01

## 2022-01-01 RX ADMIN — NYSTATIN: 100000 OINTMENT TOPICAL at 14:23

## 2022-01-01 RX ADMIN — FUROSEMIDE 80 MG: 10 INJECTION, SOLUTION INTRAVENOUS at 08:47

## 2022-01-01 RX ADMIN — ALBUTEROL SULFATE 2.5 MG: 2.5 SOLUTION RESPIRATORY (INHALATION) at 07:32

## 2022-01-01 RX ADMIN — PIPERACILLIN AND TAZOBACTAM 4.5 G: 4; .5 INJECTION, POWDER, FOR SOLUTION INTRAVENOUS at 02:11

## 2022-01-01 RX ADMIN — DEXMEDETOMIDINE HYDROCHLORIDE 0.7 MCG/KG/HR: 400 INJECTION INTRAVENOUS at 19:35

## 2022-01-01 RX ADMIN — PANTOPRAZOLE SODIUM 40 MG: 40 INJECTION, POWDER, FOR SOLUTION INTRAVENOUS at 08:25

## 2022-01-01 RX ADMIN — MEROPENEM 1 G: 1 INJECTION, POWDER, FOR SOLUTION INTRAVENOUS at 08:39

## 2022-01-01 RX ADMIN — CHLORHEXIDINE GLUCONATE 15 ML: 1.2 SOLUTION ORAL at 07:54

## 2022-01-01 RX ADMIN — HEPARIN SODIUM 2700 UNITS/HR: 1000 INJECTION INTRAVENOUS; SUBCUTANEOUS at 05:56

## 2022-01-01 RX ADMIN — HYDROCORTISONE SODIUM SUCCINATE 50 MG: 100 INJECTION, POWDER, FOR SOLUTION INTRAMUSCULAR; INTRAVENOUS at 17:48

## 2022-01-01 RX ADMIN — ACETYLCYSTEINE 1 ML: 200 SOLUTION ORAL; RESPIRATORY (INHALATION) at 13:05

## 2022-01-01 RX ADMIN — MICONAZOLE NITRATE: 2 POWDER TOPICAL at 22:36

## 2022-01-01 RX ADMIN — Medication 40 MG: at 08:09

## 2022-01-01 RX ADMIN — POTASSIUM CHLORIDE 20 MEQ: 20 SOLUTION ORAL at 08:59

## 2022-01-01 RX ADMIN — AMIODARONE HYDROCHLORIDE 400 MG: 200 TABLET ORAL at 09:02

## 2022-01-01 RX ADMIN — ALBUTEROL SULFATE 2.5 MG: 2.5 SOLUTION RESPIRATORY (INHALATION) at 13:09

## 2022-01-01 RX ADMIN — MULTIVIT AND MINERALS-FERROUS GLUCONATE 9 MG IRON/15 ML ORAL LIQUID 15 ML: at 08:52

## 2022-01-01 RX ADMIN — NOREPINEPHRINE BITARTRATE 4 MG/250 ML (16 MCG/ML) IN 0.9 % NACL IV 0.1 MCG/KG/MIN: at 23:08

## 2022-01-01 RX ADMIN — PROPOFOL 15 MCG/KG/MIN: 10 INJECTION, EMULSION INTRAVENOUS at 01:12

## 2022-01-01 RX ADMIN — OXYCODONE HYDROCHLORIDE 10 MG: 5 SOLUTION ORAL at 20:43

## 2022-01-01 RX ADMIN — DULOXETINE 60 MG: 60 CAPSULE, DELAYED RELEASE ORAL at 04:53

## 2022-01-01 RX ADMIN — PIPERACILLIN AND TAZOBACTAM 4.5 G: 4; .5 INJECTION, POWDER, FOR SOLUTION INTRAVENOUS at 08:17

## 2022-01-01 RX ADMIN — Medication 2 PACKET: at 21:57

## 2022-01-01 RX ADMIN — MICONAZOLE NITRATE: 2 POWDER TOPICAL at 21:14

## 2022-01-01 RX ADMIN — DEXMEDETOMIDINE HYDROCHLORIDE 0.6 MCG/KG/HR: 400 INJECTION INTRAVENOUS at 01:23

## 2022-01-01 RX ADMIN — MIDAZOLAM HYDROCHLORIDE 6 MG/HR: 1 INJECTION, SOLUTION INTRAVENOUS at 21:49

## 2022-01-01 RX ADMIN — NYSTATIN: 100000 OINTMENT TOPICAL at 22:00

## 2022-01-01 RX ADMIN — Medication 2 PACKET: at 15:55

## 2022-01-01 RX ADMIN — OXYCODONE HYDROCHLORIDE 10 MG: 5 SOLUTION ORAL at 00:55

## 2022-01-01 RX ADMIN — PIPERACILLIN SODIUM AND TAZOBACTAM SODIUM 3.38 G: 3; .375 INJECTION, POWDER, LYOPHILIZED, FOR SOLUTION INTRAVENOUS at 08:39

## 2022-01-01 RX ADMIN — Medication 2 PACKET: at 15:53

## 2022-01-01 RX ADMIN — NYSTATIN: 100000 OINTMENT TOPICAL at 21:47

## 2022-01-01 RX ADMIN — Medication 2 SPRAY: at 17:08

## 2022-01-01 RX ADMIN — HEPARIN SODIUM 2550 UNITS/HR: 1000 INJECTION INTRAVENOUS; SUBCUTANEOUS at 02:07

## 2022-01-01 RX ADMIN — SENNOSIDES 5 ML: 8.8 LIQUID ORAL at 21:46

## 2022-01-01 RX ADMIN — CHLORHEXIDINE GLUCONATE 15 ML: 1.2 SOLUTION ORAL at 21:37

## 2022-01-01 RX ADMIN — INSULIN ASPART 1 UNITS: 100 INJECTION, SOLUTION INTRAVENOUS; SUBCUTANEOUS at 21:14

## 2022-01-01 RX ADMIN — Medication 1 CAPSULE: at 20:05

## 2022-01-01 RX ADMIN — Medication 2 PACKET: at 09:02

## 2022-01-01 RX ADMIN — ALBUTEROL SULFATE 2.5 MG: 2.5 SOLUTION RESPIRATORY (INHALATION) at 00:37

## 2022-01-01 RX ADMIN — ALBUTEROL SULFATE 2.5 MG: 2.5 SOLUTION RESPIRATORY (INHALATION) at 08:58

## 2022-01-01 RX ADMIN — HEPARIN SODIUM 2400 UNITS/HR: 1000 INJECTION INTRAVENOUS; SUBCUTANEOUS at 00:44

## 2022-01-01 RX ADMIN — SODIUM CHLORIDE SOLN NEBU 3% 3 ML: 3 NEBU SOLN at 08:08

## 2022-01-01 RX ADMIN — MULTIVIT AND MINERALS-FERROUS GLUCONATE 9 MG IRON/15 ML ORAL LIQUID 15 ML: at 08:10

## 2022-01-01 RX ADMIN — PIPERACILLIN AND TAZOBACTAM 4.5 G: 4; .5 INJECTION, POWDER, FOR SOLUTION INTRAVENOUS at 20:06

## 2022-01-01 RX ADMIN — NYSTATIN: 100000 OINTMENT TOPICAL at 09:04

## 2022-01-01 RX ADMIN — Medication 2 PACKET: at 00:13

## 2022-01-01 RX ADMIN — CHLORHEXIDINE GLUCONATE 15 ML: 1.2 SOLUTION ORAL at 19:57

## 2022-01-01 RX ADMIN — HEPARIN SODIUM 2100 UNITS/HR: 1000 INJECTION INTRAVENOUS; SUBCUTANEOUS at 05:48

## 2022-01-01 RX ADMIN — PIPERACILLIN AND TAZOBACTAM 4.5 G: 4; .5 INJECTION, POWDER, FOR SOLUTION INTRAVENOUS at 21:37

## 2022-01-01 RX ADMIN — PROPOFOL 50 MCG/KG/MIN: 10 INJECTION, EMULSION INTRAVENOUS at 04:14

## 2022-01-01 RX ADMIN — CHLORHEXIDINE GLUCONATE 15 ML: 1.2 SOLUTION ORAL at 09:01

## 2022-01-01 RX ADMIN — HEPARIN SODIUM 2400 UNITS/HR: 1000 INJECTION INTRAVENOUS; SUBCUTANEOUS at 00:13

## 2022-01-01 RX ADMIN — PANTOPRAZOLE SODIUM 40 MG: 40 INJECTION, POWDER, FOR SOLUTION INTRAVENOUS at 08:43

## 2022-01-01 RX ADMIN — HEPARIN SODIUM 5000 UNITS: 5000 INJECTION INTRAVENOUS; SUBCUTANEOUS at 16:37

## 2022-01-01 RX ADMIN — POTASSIUM CHLORIDE 40 MEQ: 1.5 POWDER, FOR SOLUTION ORAL at 22:05

## 2022-01-01 RX ADMIN — MULTIVIT AND MINERALS-FERROUS GLUCONATE 9 MG IRON/15 ML ORAL LIQUID 15 ML: at 08:46

## 2022-01-01 RX ADMIN — MIDAZOLAM HYDROCHLORIDE 6 MG/HR: 1 INJECTION, SOLUTION INTRAVENOUS at 20:45

## 2022-01-01 RX ADMIN — AMIODARONE HYDROCHLORIDE 400 MG: 200 TABLET ORAL at 08:46

## 2022-01-01 RX ADMIN — POTASSIUM CHLORIDE 20 MEQ: 1.5 POWDER, FOR SOLUTION ORAL at 05:46

## 2022-01-01 RX ADMIN — Medication 40 MG: at 07:58

## 2022-01-01 RX ADMIN — ASPIRIN 81 MG CHEWABLE TABLET 81 MG: 81 TABLET CHEWABLE at 08:22

## 2022-01-01 RX ADMIN — DEXMEDETOMIDINE HYDROCHLORIDE 0.7 MCG/KG/HR: 400 INJECTION INTRAVENOUS at 20:45

## 2022-01-01 RX ADMIN — DEXMEDETOMIDINE HYDROCHLORIDE 0.6 MCG/KG/HR: 400 INJECTION INTRAVENOUS at 22:43

## 2022-01-01 RX ADMIN — EPOPROSTENOL 20 NG/KG/MIN: 1.5 INJECTION, POWDER, LYOPHILIZED, FOR SOLUTION INTRAVENOUS at 18:41

## 2022-01-01 RX ADMIN — Medication 2 PACKET: at 17:56

## 2022-01-01 RX ADMIN — MULTIVIT AND MINERALS-FERROUS GLUCONATE 9 MG IRON/15 ML ORAL LIQUID 15 ML: at 09:01

## 2022-01-01 RX ADMIN — Medication 1 CAPSULE: at 08:10

## 2022-01-01 RX ADMIN — PIPERACILLIN AND TAZOBACTAM 4.5 G: 4; .5 INJECTION, POWDER, FOR SOLUTION INTRAVENOUS at 07:31

## 2022-01-01 RX ADMIN — ACETYLCYSTEINE 1 ML: 200 SOLUTION ORAL; RESPIRATORY (INHALATION) at 19:09

## 2022-01-01 RX ADMIN — ACETYLCYSTEINE 1 ML: 200 SOLUTION ORAL; RESPIRATORY (INHALATION) at 08:01

## 2022-01-01 RX ADMIN — INSULIN ASPART 1 UNITS: 100 INJECTION, SOLUTION INTRAVENOUS; SUBCUTANEOUS at 16:02

## 2022-01-01 RX ADMIN — Medication 2 PACKET: at 22:15

## 2022-01-01 RX ADMIN — PROPOFOL 50 MCG/KG/MIN: 10 INJECTION, EMULSION INTRAVENOUS at 01:30

## 2022-01-01 RX ADMIN — PIPERACILLIN SODIUM AND TAZOBACTAM SODIUM 4.5 G: 4; .5 INJECTION, POWDER, LYOPHILIZED, FOR SOLUTION INTRAVENOUS at 08:51

## 2022-01-01 RX ADMIN — Medication 1 CAPSULE: at 09:32

## 2022-01-01 RX ADMIN — ACETYLCYSTEINE 1 ML: 200 SOLUTION ORAL; RESPIRATORY (INHALATION) at 07:47

## 2022-01-01 RX ADMIN — Medication 2 PACKET: at 21:13

## 2022-01-01 RX ADMIN — ALBUTEROL SULFATE 2.5 MG: 2.5 SOLUTION RESPIRATORY (INHALATION) at 19:58

## 2022-01-01 RX ADMIN — INSULIN ASPART 1 UNITS: 100 INJECTION, SOLUTION INTRAVENOUS; SUBCUTANEOUS at 20:06

## 2022-01-01 RX ADMIN — NOREPINEPHRINE BITARTRATE 4 MG/250 ML (16 MCG/ML) IN 0.9 % NACL IV 0.1 MCG/KG/MIN: at 09:32

## 2022-01-01 RX ADMIN — HEPARIN SODIUM 2850 UNITS/HR: 1000 INJECTION INTRAVENOUS; SUBCUTANEOUS at 21:49

## 2022-01-01 RX ADMIN — PIPERACILLIN SODIUM AND TAZOBACTAM SODIUM 4.5 G: 4; .5 INJECTION, POWDER, LYOPHILIZED, FOR SOLUTION INTRAVENOUS at 11:05

## 2022-01-01 RX ADMIN — PROPOFOL 100 MG: 10 INJECTION, EMULSION INTRAVENOUS at 09:13

## 2022-01-01 RX ADMIN — ACETYLCYSTEINE 1 ML: 200 SOLUTION ORAL; RESPIRATORY (INHALATION) at 15:31

## 2022-01-01 RX ADMIN — PROPOFOL 70 MCG/KG/MIN: 10 INJECTION, EMULSION INTRAVENOUS at 08:07

## 2022-01-01 RX ADMIN — OXYCODONE HYDROCHLORIDE 5 MG: 5 SOLUTION ORAL at 11:20

## 2022-01-01 RX ADMIN — SODIUM CHLORIDE SOLN NEBU 3% 3 ML: 3 NEBU SOLN at 19:58

## 2022-01-01 RX ADMIN — PIPERACILLIN SODIUM AND TAZOBACTAM SODIUM 3.38 G: 3; .375 INJECTION, POWDER, LYOPHILIZED, FOR SOLUTION INTRAVENOUS at 20:14

## 2022-01-01 RX ADMIN — CHLORHEXIDINE GLUCONATE 15 ML: 1.2 SOLUTION ORAL at 07:38

## 2022-01-01 RX ADMIN — PROPOFOL 50 MCG/KG/MIN: 10 INJECTION, EMULSION INTRAVENOUS at 05:45

## 2022-01-01 RX ADMIN — ACETYLCYSTEINE 1 ML: 200 SOLUTION ORAL; RESPIRATORY (INHALATION) at 13:08

## 2022-01-01 RX ADMIN — ASPIRIN 81 MG CHEWABLE TABLET 81 MG: 81 TABLET CHEWABLE at 09:01

## 2022-01-01 RX ADMIN — Medication 2 SPRAY: at 21:12

## 2022-01-01 RX ADMIN — AMIODARONE HYDROCHLORIDE 400 MG: 200 TABLET ORAL at 08:11

## 2022-01-01 RX ADMIN — DEXMEDETOMIDINE HYDROCHLORIDE 0.2 MCG/KG/HR: 400 INJECTION INTRAVENOUS at 10:35

## 2022-01-01 RX ADMIN — ALBUTEROL SULFATE 2.5 MG: 2.5 SOLUTION RESPIRATORY (INHALATION) at 01:15

## 2022-01-01 RX ADMIN — PROPOFOL 15 MCG/KG/MIN: 10 INJECTION, EMULSION INTRAVENOUS at 16:46

## 2022-01-01 RX ADMIN — AMPICILLIN SODIUM 2 G: 2 INJECTION, POWDER, FOR SOLUTION INTRAVENOUS at 18:28

## 2022-01-01 RX ADMIN — HEPARIN SODIUM 2250 UNITS/HR: 1000 INJECTION INTRAVENOUS; SUBCUTANEOUS at 14:00

## 2022-01-01 RX ADMIN — Medication 2 PACKET: at 10:26

## 2022-01-01 RX ADMIN — VANCOMYCIN HYDROCHLORIDE 1000 MG: 1 INJECTION, SOLUTION INTRAVENOUS at 15:01

## 2022-01-01 RX ADMIN — LEVALBUTEROL HYDROCHLORIDE 0.63 MG: 1.25 SOLUTION, CONCENTRATE RESPIRATORY (INHALATION) at 10:43

## 2022-01-01 RX ADMIN — CHLORHEXIDINE GLUCONATE 15 ML: 1.2 SOLUTION ORAL at 07:56

## 2022-01-01 RX ADMIN — HYDROCORTISONE SODIUM SUCCINATE 50 MG: 100 INJECTION, POWDER, FOR SOLUTION INTRAMUSCULAR; INTRAVENOUS at 18:17

## 2022-01-01 RX ADMIN — PIPERACILLIN SODIUM AND TAZOBACTAM SODIUM 4.5 G: 4; .5 INJECTION, POWDER, LYOPHILIZED, FOR SOLUTION INTRAVENOUS at 02:08

## 2022-01-01 RX ADMIN — CHLORHEXIDINE GLUCONATE 15 ML: 1.2 SOLUTION ORAL at 20:54

## 2022-01-01 RX ADMIN — DEXMEDETOMIDINE HYDROCHLORIDE 0.7 MCG/KG/HR: 400 INJECTION INTRAVENOUS at 00:04

## 2022-01-01 RX ADMIN — NYSTATIN: 100000 OINTMENT TOPICAL at 21:34

## 2022-01-01 RX ADMIN — DEXMEDETOMIDINE HYDROCHLORIDE 0.4 MCG/KG/HR: 400 INJECTION INTRAVENOUS at 23:42

## 2022-01-01 RX ADMIN — SODIUM CHLORIDE: 9 INJECTION, SOLUTION INTRAVENOUS at 03:43

## 2022-01-01 RX ADMIN — Medication 1 CAPSULE: at 08:43

## 2022-01-01 RX ADMIN — Medication 2 SPRAY: at 18:00

## 2022-01-01 RX ADMIN — AMPICILLIN SODIUM 2 G: 2 INJECTION, POWDER, FOR SOLUTION INTRAVENOUS at 20:00

## 2022-01-01 RX ADMIN — PIPERACILLIN AND TAZOBACTAM 4.5 G: 4; .5 INJECTION, POWDER, FOR SOLUTION INTRAVENOUS at 01:26

## 2022-01-01 RX ADMIN — Medication 1 CAPSULE: at 10:50

## 2022-01-01 RX ADMIN — AMIODARONE HYDROCHLORIDE 400 MG: 200 TABLET ORAL at 08:40

## 2022-01-01 RX ADMIN — MICONAZOLE NITRATE: 2 POWDER TOPICAL at 08:49

## 2022-01-01 RX ADMIN — FENTANYL CITRATE 50 MCG/HR: 50 INJECTION INTRAVENOUS at 15:53

## 2022-01-01 RX ADMIN — OXYCODONE HYDROCHLORIDE 10 MG: 5 SOLUTION ORAL at 14:15

## 2022-01-01 RX ADMIN — ALBUTEROL SULFATE 2.5 MG: 2.5 SOLUTION RESPIRATORY (INHALATION) at 00:44

## 2022-01-01 RX ADMIN — ALBUTEROL SULFATE 2.5 MG: 2.5 SOLUTION RESPIRATORY (INHALATION) at 13:06

## 2022-01-01 RX ADMIN — BACLOFEN 10 MG: 10 TABLET ORAL at 21:23

## 2022-01-01 RX ADMIN — ASPIRIN 81 MG CHEWABLE TABLET 81 MG: 81 TABLET CHEWABLE at 08:55

## 2022-01-01 RX ADMIN — MULTIVIT AND MINERALS-FERROUS GLUCONATE 9 MG IRON/15 ML ORAL LIQUID 15 ML: at 08:35

## 2022-01-01 RX ADMIN — AMPICILLIN SODIUM 2 G: 2 INJECTION, POWDER, FOR SOLUTION INTRAVENOUS at 05:28

## 2022-01-01 RX ADMIN — ACETYLCYSTEINE 1 ML: 200 SOLUTION ORAL; RESPIRATORY (INHALATION) at 07:32

## 2022-01-01 RX ADMIN — POTASSIUM CHLORIDE 20 MEQ: 20 SOLUTION ORAL at 14:31

## 2022-01-01 RX ADMIN — Medication 2 PACKET: at 08:57

## 2022-01-01 RX ADMIN — POTASSIUM CHLORIDE 40 MEQ: 1.5 POWDER, FOR SOLUTION ORAL at 06:00

## 2022-01-01 RX ADMIN — ACETYLCYSTEINE 1 ML: 200 SOLUTION ORAL; RESPIRATORY (INHALATION) at 00:27

## 2022-01-01 RX ADMIN — GLYCOPYRROLATE 0.2 MG: 0.2 INJECTION, SOLUTION INTRAMUSCULAR; INTRAVENOUS at 16:50

## 2022-01-01 RX ADMIN — PIPERACILLIN SODIUM AND TAZOBACTAM SODIUM 4.5 G: 4; .5 INJECTION, POWDER, LYOPHILIZED, FOR SOLUTION INTRAVENOUS at 23:11

## 2022-01-01 RX ADMIN — PIPERACILLIN AND TAZOBACTAM 4.5 G: 4; .5 INJECTION, POWDER, FOR SOLUTION INTRAVENOUS at 19:58

## 2022-01-01 RX ADMIN — LIDOCAINE HYDROCHLORIDE 10 ML: 10 INJECTION, SOLUTION EPIDURAL; INFILTRATION; INTRACAUDAL; PERINEURAL at 13:59

## 2022-01-01 RX ADMIN — Medication 1 CAPSULE: at 20:14

## 2022-01-01 RX ADMIN — PROPOFOL 15 MCG/KG/MIN: 10 INJECTION, EMULSION INTRAVENOUS at 08:06

## 2022-01-01 RX ADMIN — PIPERACILLIN AND TAZOBACTAM 4.5 G: 4; .5 INJECTION, POWDER, FOR SOLUTION INTRAVENOUS at 15:49

## 2022-01-01 RX ADMIN — PROPOFOL 50 MCG/KG/MIN: 10 INJECTION, EMULSION INTRAVENOUS at 17:22

## 2022-01-01 RX ADMIN — ALBUTEROL SULFATE 2.5 MG: 2.5 SOLUTION RESPIRATORY (INHALATION) at 07:08

## 2022-01-01 RX ADMIN — PIPERACILLIN AND TAZOBACTAM 4.5 G: 4; .5 INJECTION, POWDER, FOR SOLUTION INTRAVENOUS at 02:46

## 2022-01-01 RX ADMIN — Medication 2 PACKET: at 10:36

## 2022-01-01 RX ADMIN — INSULIN ASPART 1 UNITS: 100 INJECTION, SOLUTION INTRAVENOUS; SUBCUTANEOUS at 04:10

## 2022-01-01 RX ADMIN — Medication 1 CAPSULE: at 21:13

## 2022-01-01 RX ADMIN — INSULIN ASPART 1 UNITS: 100 INJECTION, SOLUTION INTRAVENOUS; SUBCUTANEOUS at 11:55

## 2022-01-01 RX ADMIN — POTASSIUM CHLORIDE 40 MEQ: 1.5 POWDER, FOR SOLUTION ORAL at 15:41

## 2022-01-01 RX ADMIN — HEPARIN SODIUM 1800 UNITS/HR: 1000 INJECTION INTRAVENOUS; SUBCUTANEOUS at 17:15

## 2022-01-01 RX ADMIN — Medication 40 MG: at 06:40

## 2022-01-01 RX ADMIN — AMPICILLIN SODIUM 2 G: 2 INJECTION, POWDER, FOR SOLUTION INTRAVENOUS at 14:26

## 2022-01-01 RX ADMIN — HYDROCORTISONE SODIUM SUCCINATE 25 MG: 100 INJECTION, POWDER, FOR SOLUTION INTRAMUSCULAR; INTRAVENOUS at 08:39

## 2022-01-01 RX ADMIN — AMIODARONE HYDROCHLORIDE 400 MG: 200 TABLET ORAL at 08:55

## 2022-01-01 RX ADMIN — SODIUM CHLORIDE SOLN NEBU 3% 3 ML: 3 NEBU SOLN at 00:39

## 2022-01-01 RX ADMIN — ACETYLCYSTEINE 1 ML: 200 SOLUTION ORAL; RESPIRATORY (INHALATION) at 07:09

## 2022-01-01 RX ADMIN — VANCOMYCIN HYDROCHLORIDE 2500 MG: 5 INJECTION, POWDER, LYOPHILIZED, FOR SOLUTION INTRAVENOUS at 09:25

## 2022-01-01 RX ADMIN — DEXMEDETOMIDINE HYDROCHLORIDE 0.6 MCG/KG/HR: 400 INJECTION INTRAVENOUS at 03:50

## 2022-01-01 RX ADMIN — PROPOFOL 50 MCG/KG/MIN: 10 INJECTION, EMULSION INTRAVENOUS at 00:27

## 2022-01-01 RX ADMIN — ALBUTEROL SULFATE 2.5 MG: 2.5 SOLUTION RESPIRATORY (INHALATION) at 23:51

## 2022-01-01 RX ADMIN — ACETYLCYSTEINE 1 ML: 200 SOLUTION ORAL; RESPIRATORY (INHALATION) at 00:39

## 2022-01-01 RX ADMIN — Medication 2 PACKET: at 10:50

## 2022-01-01 RX ADMIN — MICONAZOLE NITRATE: 2 POWDER TOPICAL at 20:36

## 2022-01-01 RX ADMIN — NOREPINEPHRINE BITARTRATE 4 MG/250 ML (16 MCG/ML) IN 0.9 % NACL IV 0.17 MCG/KG/MIN: at 10:33

## 2022-01-01 RX ADMIN — EPOPROSTENOL 20 NG/KG/MIN: 1.5 INJECTION, POWDER, LYOPHILIZED, FOR SOLUTION INTRAVENOUS at 05:15

## 2022-01-01 RX ADMIN — PROPOFOL 15 MCG/KG/MIN: 10 INJECTION, EMULSION INTRAVENOUS at 15:43

## 2022-01-01 RX ADMIN — VANCOMYCIN HYDROCHLORIDE 2500 MG: 500 INJECTION, POWDER, LYOPHILIZED, FOR SOLUTION INTRAVENOUS at 12:28

## 2022-01-01 RX ADMIN — DEXMEDETOMIDINE HYDROCHLORIDE 0.9 MCG/KG/HR: 400 INJECTION INTRAVENOUS at 15:32

## 2022-01-01 RX ADMIN — MULTIVIT AND MINERALS-FERROUS GLUCONATE 9 MG IRON/15 ML ORAL LIQUID 15 ML: at 08:47

## 2022-01-01 RX ADMIN — ALBUTEROL SULFATE 2.5 MG: 2.5 SOLUTION RESPIRATORY (INHALATION) at 13:29

## 2022-01-01 RX ADMIN — POLYETHYLENE GLYCOL 3350 17 G: 17 POWDER, FOR SOLUTION ORAL at 08:51

## 2022-01-01 RX ADMIN — Medication 1 CAPSULE: at 20:01

## 2022-01-01 RX ADMIN — PROPOFOL 20 MCG/KG/MIN: 10 INJECTION, EMULSION INTRAVENOUS at 15:34

## 2022-01-01 RX ADMIN — NYSTATIN: 100000 OINTMENT TOPICAL at 10:48

## 2022-01-01 RX ADMIN — HEPARIN SODIUM 2400 UNITS/HR: 1000 INJECTION INTRAVENOUS; SUBCUTANEOUS at 12:26

## 2022-01-01 RX ADMIN — Medication 1 CAPSULE: at 20:36

## 2022-01-01 RX ADMIN — MULTIVIT AND MINERALS-FERROUS GLUCONATE 9 MG IRON/15 ML ORAL LIQUID 15 ML: at 08:50

## 2022-01-01 RX ADMIN — METOPROLOL TARTRATE 25 MG: 25 TABLET, FILM COATED ORAL at 10:53

## 2022-01-01 RX ADMIN — POTASSIUM CHLORIDE 20 MEQ: 20 SOLUTION ORAL at 20:52

## 2022-01-01 RX ADMIN — SODIUM CHLORIDE SOLN NEBU 3% 3 ML: 3 NEBU SOLN at 12:40

## 2022-01-01 RX ADMIN — Medication 2 PACKET: at 21:58

## 2022-01-01 RX ADMIN — Medication 1 CAPSULE: at 20:04

## 2022-01-01 RX ADMIN — ALBUTEROL SULFATE 2.5 MG: 2.5 SOLUTION RESPIRATORY (INHALATION) at 12:20

## 2022-01-01 RX ADMIN — ACETYLCYSTEINE 1 ML: 200 SOLUTION ORAL; RESPIRATORY (INHALATION) at 12:00

## 2022-01-01 RX ADMIN — MICONAZOLE NITRATE: 2 POWDER TOPICAL at 09:32

## 2022-01-01 RX ADMIN — Medication 2 SPRAY: at 22:41

## 2022-01-01 RX ADMIN — ALBUTEROL SULFATE 2.5 MG: 2.5 SOLUTION RESPIRATORY (INHALATION) at 12:21

## 2022-01-01 RX ADMIN — HYDROCORTISONE SODIUM SUCCINATE 50 MG: 100 INJECTION, POWDER, FOR SOLUTION INTRAMUSCULAR; INTRAVENOUS at 04:13

## 2022-01-01 RX ADMIN — PIPERACILLIN SODIUM AND TAZOBACTAM SODIUM 3.38 G: 3; .375 INJECTION, POWDER, LYOPHILIZED, FOR SOLUTION INTRAVENOUS at 01:49

## 2022-01-01 RX ADMIN — POTASSIUM CHLORIDE 40 MEQ: 1.5 POWDER, FOR SOLUTION ORAL at 06:48

## 2022-01-01 RX ADMIN — POTASSIUM CHLORIDE 40 MEQ: 1.5 POWDER, FOR SOLUTION ORAL at 16:24

## 2022-01-01 RX ADMIN — MICONAZOLE NITRATE: 2 POWDER TOPICAL at 11:16

## 2022-01-01 RX ADMIN — EPOPROSTENOL 10 NG/KG/MIN: 1.5 INJECTION, POWDER, LYOPHILIZED, FOR SOLUTION INTRAVENOUS at 14:17

## 2022-01-01 RX ADMIN — VASOPRESSIN 2.4 UNITS/HR: 20 INJECTION INTRAVENOUS at 19:26

## 2022-01-01 RX ADMIN — DULOXETINE 60 MG: 60 CAPSULE, DELAYED RELEASE ORAL at 03:11

## 2022-01-01 RX ADMIN — PIPERACILLIN AND TAZOBACTAM 4.5 G: 4; .5 INJECTION, POWDER, FOR SOLUTION INTRAVENOUS at 08:09

## 2022-01-01 RX ADMIN — ACETYLCYSTEINE 1 ML: 200 SOLUTION ORAL; RESPIRATORY (INHALATION) at 19:03

## 2022-01-01 RX ADMIN — PROPOFOL 15 MCG/KG/MIN: 10 INJECTION, EMULSION INTRAVENOUS at 08:45

## 2022-01-01 RX ADMIN — DULOXETINE 60 MG: 60 CAPSULE, DELAYED RELEASE ORAL at 16:11

## 2022-01-01 RX ADMIN — ACETYLCYSTEINE 1 ML: 200 SOLUTION ORAL; RESPIRATORY (INHALATION) at 01:15

## 2022-01-01 RX ADMIN — MICONAZOLE NITRATE: 2 POWDER TOPICAL at 08:11

## 2022-01-01 RX ADMIN — PROPOFOL 50 MCG/KG/MIN: 10 INJECTION, EMULSION INTRAVENOUS at 06:10

## 2022-01-01 RX ADMIN — Medication 2 PACKET: at 08:12

## 2022-01-01 RX ADMIN — SODIUM CHLORIDE 500 ML: 9 INJECTION, SOLUTION INTRAVENOUS at 14:24

## 2022-01-01 RX ADMIN — PIPERACILLIN AND TAZOBACTAM 4.5 G: 4; .5 INJECTION, POWDER, FOR SOLUTION INTRAVENOUS at 08:11

## 2022-01-01 RX ADMIN — EPOPROSTENOL 20 NG/KG/MIN: 1.5 INJECTION, POWDER, LYOPHILIZED, FOR SOLUTION INTRAVENOUS at 07:49

## 2022-01-01 RX ADMIN — ASPIRIN 81 MG CHEWABLE TABLET 81 MG: 81 TABLET CHEWABLE at 09:21

## 2022-01-01 RX ADMIN — OXYCODONE HYDROCHLORIDE 10 MG: 5 SOLUTION ORAL at 01:04

## 2022-01-01 RX ADMIN — PANTOPRAZOLE SODIUM 40 MG: 40 INJECTION, POWDER, FOR SOLUTION INTRAVENOUS at 08:39

## 2022-01-01 RX ADMIN — Medication 1 CAPSULE: at 20:50

## 2022-01-01 RX ADMIN — HUMAN ALBUMIN MICROSPHERES AND PERFLUTREN 9 ML: 10; .22 INJECTION, SOLUTION INTRAVENOUS at 11:33

## 2022-01-01 RX ADMIN — NYSTATIN: 100000 OINTMENT TOPICAL at 09:02

## 2022-01-01 RX ADMIN — DEXMEDETOMIDINE HYDROCHLORIDE 0.6 MCG/KG/HR: 400 INJECTION INTRAVENOUS at 20:30

## 2022-01-01 RX ADMIN — MICONAZOLE NITRATE: 2 POWDER TOPICAL at 20:14

## 2022-01-01 RX ADMIN — EPOPROSTENOL 20 NG/KG/MIN: 1.5 INJECTION, POWDER, LYOPHILIZED, FOR SOLUTION INTRAVENOUS at 17:21

## 2022-01-01 RX ADMIN — SODIUM CHLORIDE, POTASSIUM CHLORIDE, SODIUM LACTATE AND CALCIUM CHLORIDE 500 ML: 600; 310; 30; 20 INJECTION, SOLUTION INTRAVENOUS at 12:45

## 2022-01-01 RX ADMIN — ACETYLCYSTEINE 1 ML: 200 SOLUTION ORAL; RESPIRATORY (INHALATION) at 08:08

## 2022-01-01 RX ADMIN — PIPERACILLIN AND TAZOBACTAM 4.5 G: 4; .5 INJECTION, POWDER, FOR SOLUTION INTRAVENOUS at 02:52

## 2022-01-01 RX ADMIN — MULTIVIT AND MINERALS-FERROUS GLUCONATE 9 MG IRON/15 ML ORAL LIQUID 15 ML: at 10:50

## 2022-01-01 RX ADMIN — ASPIRIN 81 MG CHEWABLE TABLET 81 MG: 81 TABLET CHEWABLE at 09:02

## 2022-01-01 RX ADMIN — PIPERACILLIN SODIUM AND TAZOBACTAM SODIUM 4.5 G: 4; .5 INJECTION, POWDER, LYOPHILIZED, FOR SOLUTION INTRAVENOUS at 04:51

## 2022-01-01 RX ADMIN — SODIUM CHLORIDE SOLN NEBU 3% 3 ML: 3 NEBU SOLN at 07:08

## 2022-01-01 RX ADMIN — Medication 2 PACKET: at 22:10

## 2022-01-01 RX ADMIN — HYDROCORTISONE SODIUM SUCCINATE 50 MG: 100 INJECTION, POWDER, FOR SOLUTION INTRAMUSCULAR; INTRAVENOUS at 05:24

## 2022-01-01 RX ADMIN — MEROPENEM 1 G: 1 INJECTION, POWDER, FOR SOLUTION INTRAVENOUS at 19:31

## 2022-01-01 RX ADMIN — Medication 2 PACKET: at 08:11

## 2022-01-01 RX ADMIN — ACETYLCYSTEINE 1 ML: 200 SOLUTION ORAL; RESPIRATORY (INHALATION) at 20:05

## 2022-01-01 RX ADMIN — ALBUTEROL SULFATE 2.5 MG: 2.5 SOLUTION RESPIRATORY (INHALATION) at 00:28

## 2022-01-01 RX ADMIN — ACETYLCYSTEINE 1 ML: 200 SOLUTION ORAL; RESPIRATORY (INHALATION) at 19:40

## 2022-01-01 RX ADMIN — VASOPRESSIN 2.4 UNITS/HR: 20 INJECTION INTRAVENOUS at 11:31

## 2022-01-01 RX ADMIN — ACETYLCYSTEINE 1 ML: 200 SOLUTION ORAL; RESPIRATORY (INHALATION) at 01:04

## 2022-01-01 RX ADMIN — Medication 40 MG: at 08:17

## 2022-01-01 RX ADMIN — Medication 2 PACKET: at 09:04

## 2022-01-01 RX ADMIN — FENTANYL CITRATE 200 MCG/HR: 50 INJECTION INTRAVENOUS at 00:27

## 2022-01-01 RX ADMIN — CHLORHEXIDINE GLUCONATE 15 ML: 1.2 SOLUTION ORAL at 09:02

## 2022-01-01 RX ADMIN — ACETYLCYSTEINE 1 ML: 200 SOLUTION ORAL; RESPIRATORY (INHALATION) at 07:07

## 2022-01-01 RX ADMIN — PIPERACILLIN SODIUM AND TAZOBACTAM SODIUM 4.5 G: 4; .5 INJECTION, POWDER, LYOPHILIZED, FOR SOLUTION INTRAVENOUS at 20:53

## 2022-01-01 RX ADMIN — EPOPROSTENOL 20 NG/KG/MIN: 1.5 INJECTION, POWDER, LYOPHILIZED, FOR SOLUTION INTRAVENOUS at 12:17

## 2022-01-01 RX ADMIN — FUROSEMIDE 40 MG: 10 INJECTION, SOLUTION INTRAVENOUS at 14:16

## 2022-01-01 RX ADMIN — ASPIRIN 81 MG CHEWABLE TABLET 81 MG: 81 TABLET CHEWABLE at 08:10

## 2022-01-01 RX ADMIN — ALBUTEROL SULFATE 2.5 MG: 2.5 SOLUTION RESPIRATORY (INHALATION) at 13:33

## 2022-01-01 RX ADMIN — ACETYLCYSTEINE 1 ML: 200 SOLUTION ORAL; RESPIRATORY (INHALATION) at 07:44

## 2022-01-01 RX ADMIN — FUROSEMIDE 80 MG: 10 INJECTION, SOLUTION INTRAVENOUS at 20:36

## 2022-01-01 RX ADMIN — ACETYLCYSTEINE 1 ML: 200 SOLUTION ORAL; RESPIRATORY (INHALATION) at 14:13

## 2022-01-01 RX ADMIN — OXYCODONE HYDROCHLORIDE 10 MG: 5 SOLUTION ORAL at 04:31

## 2022-01-01 RX ADMIN — HEPARIN SODIUM 2700 UNITS/HR: 1000 INJECTION INTRAVENOUS; SUBCUTANEOUS at 16:24

## 2022-01-01 RX ADMIN — HEPARIN SODIUM 2700 UNITS/HR: 1000 INJECTION INTRAVENOUS; SUBCUTANEOUS at 20:55

## 2022-01-01 RX ADMIN — EPOPROSTENOL 20 NG/KG/MIN: 1.5 INJECTION, POWDER, LYOPHILIZED, FOR SOLUTION INTRAVENOUS at 00:10

## 2022-01-01 RX ADMIN — NYSTATIN: 100000 OINTMENT TOPICAL at 09:26

## 2022-01-01 RX ADMIN — MICONAZOLE NITRATE: 2 POWDER TOPICAL at 09:24

## 2022-01-01 RX ADMIN — OXYCODONE HYDROCHLORIDE 10 MG: 5 SOLUTION ORAL at 04:52

## 2022-01-01 RX ADMIN — Medication 2 PACKET: at 08:49

## 2022-01-01 RX ADMIN — ACETYLCYSTEINE 1 ML: 200 SOLUTION ORAL; RESPIRATORY (INHALATION) at 02:09

## 2022-01-01 RX ADMIN — CHLORHEXIDINE GLUCONATE 15 ML: 1.2 SOLUTION ORAL at 08:17

## 2022-01-01 RX ADMIN — OXYCODONE HYDROCHLORIDE 10 MG: 5 SOLUTION ORAL at 14:56

## 2022-01-01 RX ADMIN — ALBUTEROL SULFATE 2.5 MG: 2.5 SOLUTION RESPIRATORY (INHALATION) at 13:08

## 2022-01-01 RX ADMIN — MICONAZOLE NITRATE: 2 POWDER TOPICAL at 12:23

## 2022-01-01 RX ADMIN — Medication 2 SPRAY: at 21:14

## 2022-01-01 RX ADMIN — FENTANYL CITRATE 150 MCG/HR: 50 INJECTION INTRAVENOUS at 13:47

## 2022-01-01 RX ADMIN — DEXTROSE MONOHYDRATE 300 ML: 100 INJECTION, SOLUTION INTRAVENOUS at 23:07

## 2022-01-01 RX ADMIN — POTASSIUM CHLORIDE 40 MEQ: 1.5 POWDER, FOR SOLUTION ORAL at 05:47

## 2022-01-01 RX ADMIN — Medication 1 CAPSULE: at 09:02

## 2022-01-01 RX ADMIN — FUROSEMIDE 20 MG: 10 INJECTION, SOLUTION INTRAVENOUS at 15:10

## 2022-01-01 RX ADMIN — PROPOFOL 50 MCG/KG/MIN: 10 INJECTION, EMULSION INTRAVENOUS at 20:13

## 2022-01-01 RX ADMIN — PROPOFOL 50 MCG/KG/MIN: 10 INJECTION, EMULSION INTRAVENOUS at 11:50

## 2022-01-01 RX ADMIN — Medication 2 PACKET: at 16:28

## 2022-01-01 RX ADMIN — HEPARIN SODIUM 2850 UNITS/HR: 1000 INJECTION INTRAVENOUS; SUBCUTANEOUS at 12:46

## 2022-01-01 RX ADMIN — OXYCODONE HYDROCHLORIDE 10 MG: 5 SOLUTION ORAL at 02:08

## 2022-01-01 RX ADMIN — DEXMEDETOMIDINE HYDROCHLORIDE 0.4 MCG/KG/HR: 400 INJECTION INTRAVENOUS at 17:29

## 2022-01-01 RX ADMIN — DEXMEDETOMIDINE HYDROCHLORIDE 0.4 MCG/KG/HR: 400 INJECTION INTRAVENOUS at 07:05

## 2022-01-01 RX ADMIN — ACETYLCYSTEINE 1 ML: 200 SOLUTION ORAL; RESPIRATORY (INHALATION) at 13:33

## 2022-01-01 RX ADMIN — EPOPROSTENOL 20 NG/KG/MIN: 1.5 INJECTION, POWDER, LYOPHILIZED, FOR SOLUTION INTRAVENOUS at 06:59

## 2022-01-01 RX ADMIN — NYSTATIN: 100000 OINTMENT TOPICAL at 09:47

## 2022-01-01 RX ADMIN — ALBUTEROL SULFATE 2.5 MG: 2.5 SOLUTION RESPIRATORY (INHALATION) at 07:52

## 2022-01-01 RX ADMIN — OXYCODONE HYDROCHLORIDE 10 MG: 5 SOLUTION ORAL at 09:01

## 2022-01-01 RX ADMIN — EPOPROSTENOL 20 NG/KG/MIN: 1.5 INJECTION, POWDER, LYOPHILIZED, FOR SOLUTION INTRAVENOUS at 12:51

## 2022-01-01 RX ADMIN — Medication 1 CAPSULE: at 08:55

## 2022-01-01 RX ADMIN — Medication 2 PACKET: at 17:08

## 2022-01-01 RX ADMIN — INSULIN ASPART 1 UNITS: 100 INJECTION, SOLUTION INTRAVENOUS; SUBCUTANEOUS at 17:06

## 2022-01-01 RX ADMIN — PROPOFOL 50 MCG/KG/MIN: 10 INJECTION, EMULSION INTRAVENOUS at 03:13

## 2022-01-01 RX ADMIN — ASPIRIN 81 MG CHEWABLE TABLET 81 MG: 81 TABLET CHEWABLE at 08:17

## 2022-01-01 RX ADMIN — Medication 1 CAPSULE: at 08:17

## 2022-01-01 RX ADMIN — ACETYLCYSTEINE 1 ML: 200 SOLUTION ORAL; RESPIRATORY (INHALATION) at 19:58

## 2022-01-01 RX ADMIN — ACETYLCYSTEINE 1 ML: 200 SOLUTION ORAL; RESPIRATORY (INHALATION) at 00:37

## 2022-01-01 RX ADMIN — ACETYLCYSTEINE 1 ML: 200 SOLUTION ORAL; RESPIRATORY (INHALATION) at 07:33

## 2022-01-01 RX ADMIN — ALBUTEROL SULFATE 2.5 MG: 2.5 SOLUTION RESPIRATORY (INHALATION) at 08:01

## 2022-01-01 RX ADMIN — HYDROCORTISONE SODIUM SUCCINATE 50 MG: 100 INJECTION, POWDER, FOR SOLUTION INTRAMUSCULAR; INTRAVENOUS at 23:31

## 2022-01-01 RX ADMIN — PIPERACILLIN AND TAZOBACTAM 4.5 G: 4; .5 INJECTION, POWDER, FOR SOLUTION INTRAVENOUS at 14:01

## 2022-01-01 RX ADMIN — DEXMEDETOMIDINE HYDROCHLORIDE 0.7 MCG/KG/HR: 400 INJECTION INTRAVENOUS at 06:01

## 2022-01-01 RX ADMIN — METOPROLOL TARTRATE 25 MG: 25 TABLET, FILM COATED ORAL at 22:33

## 2022-01-01 RX ADMIN — CHLORHEXIDINE GLUCONATE 15 ML: 1.2 SOLUTION ORAL at 20:14

## 2022-01-01 RX ADMIN — HEPARIN SODIUM 2700 UNITS/HR: 1000 INJECTION INTRAVENOUS; SUBCUTANEOUS at 02:46

## 2022-01-01 RX ADMIN — AMPICILLIN SODIUM 2 G: 2 INJECTION, POWDER, FOR SOLUTION INTRAVENOUS at 04:54

## 2022-01-01 RX ADMIN — ALBUTEROL SULFATE 2.5 MG: 2.5 SOLUTION RESPIRATORY (INHALATION) at 12:40

## 2022-01-01 RX ADMIN — AMPICILLIN SODIUM 2 G: 2 INJECTION, POWDER, FOR SOLUTION INTRAVENOUS at 00:55

## 2022-01-01 RX ADMIN — ALBUTEROL SULFATE 2.5 MG: 2.5 SOLUTION RESPIRATORY (INHALATION) at 00:39

## 2022-01-01 RX ADMIN — HYDROCORTISONE SODIUM SUCCINATE 50 MG: 100 INJECTION, POWDER, FOR SOLUTION INTRAMUSCULAR; INTRAVENOUS at 18:26

## 2022-01-01 RX ADMIN — AMPICILLIN SODIUM 2 G: 2 INJECTION, POWDER, FOR SOLUTION INTRAVENOUS at 00:12

## 2022-01-01 RX ADMIN — HEPARIN SODIUM 2550 UNITS/HR: 1000 INJECTION INTRAVENOUS; SUBCUTANEOUS at 17:32

## 2022-01-01 RX ADMIN — HEPARIN SODIUM 3000 UNITS/HR: 1000 INJECTION INTRAVENOUS; SUBCUTANEOUS at 06:55

## 2022-01-01 RX ADMIN — Medication 1 CAPSULE: at 09:21

## 2022-01-01 RX ADMIN — PIPERACILLIN SODIUM AND TAZOBACTAM SODIUM 4.5 G: 4; .5 INJECTION, POWDER, LYOPHILIZED, FOR SOLUTION INTRAVENOUS at 13:45

## 2022-01-01 RX ADMIN — MICONAZOLE NITRATE: 2 POWDER TOPICAL at 08:48

## 2022-01-01 RX ADMIN — Medication 2 PACKET: at 09:37

## 2022-01-01 RX ADMIN — OXYCODONE HYDROCHLORIDE 10 MG: 5 SOLUTION ORAL at 23:59

## 2022-01-01 RX ADMIN — METOLAZONE 5 MG: 5 TABLET ORAL at 22:05

## 2022-01-01 RX ADMIN — SODIUM CHLORIDE SOLN NEBU 3% 3 ML: 3 NEBU SOLN at 07:32

## 2022-01-01 RX ADMIN — ASPIRIN 81 MG CHEWABLE TABLET 81 MG: 81 TABLET CHEWABLE at 10:50

## 2022-01-01 RX ADMIN — INSULIN ASPART 1 UNITS: 100 INJECTION, SOLUTION INTRAVENOUS; SUBCUTANEOUS at 08:46

## 2022-01-01 RX ADMIN — ASPIRIN 81 MG CHEWABLE TABLET 81 MG: 81 TABLET CHEWABLE at 08:43

## 2022-01-01 RX ADMIN — PROPOFOL 50 MCG/KG/MIN: 10 INJECTION, EMULSION INTRAVENOUS at 15:41

## 2022-01-01 RX ADMIN — PANTOPRAZOLE SODIUM 40 MG: 40 INJECTION, POWDER, FOR SOLUTION INTRAVENOUS at 08:47

## 2022-01-01 RX ADMIN — HEPARIN SODIUM 5000 UNITS: 5000 INJECTION INTRAVENOUS; SUBCUTANEOUS at 08:46

## 2022-01-01 RX ADMIN — ACETYLCYSTEINE 1 ML: 200 SOLUTION ORAL; RESPIRATORY (INHALATION) at 03:21

## 2022-01-01 RX ADMIN — ALBUTEROL SULFATE 2.5 MG: 2.5 SOLUTION RESPIRATORY (INHALATION) at 07:14

## 2022-01-01 RX ADMIN — MICONAZOLE NITRATE: 2 POWDER TOPICAL at 21:44

## 2022-01-01 RX ADMIN — Medication 2 PACKET: at 17:32

## 2022-01-01 RX ADMIN — POTASSIUM CHLORIDE 40 MEQ: 1.5 POWDER, FOR SOLUTION ORAL at 10:41

## 2022-01-01 RX ADMIN — ACETYLCYSTEINE 1 ML: 200 SOLUTION ORAL; RESPIRATORY (INHALATION) at 13:45

## 2022-01-01 RX ADMIN — Medication 40 MG: at 07:50

## 2022-01-01 RX ADMIN — PIPERACILLIN AND TAZOBACTAM 4.5 G: 4; .5 INJECTION, POWDER, FOR SOLUTION INTRAVENOUS at 03:45

## 2022-01-01 RX ADMIN — NOREPINEPHRINE BITARTRATE 4 MG/250 ML (16 MCG/ML) IN 0.9 % NACL IV 0.12 MCG/KG/MIN: at 17:23

## 2022-01-01 RX ADMIN — DEXMEDETOMIDINE HYDROCHLORIDE 0.8 MCG/KG/HR: 400 INJECTION INTRAVENOUS at 08:31

## 2022-01-01 RX ADMIN — ACETYLCYSTEINE 1 ML: 200 SOLUTION ORAL; RESPIRATORY (INHALATION) at 00:06

## 2022-01-01 RX ADMIN — OXYCODONE HYDROCHLORIDE 10 MG: 5 SOLUTION ORAL at 19:05

## 2022-01-01 RX ADMIN — HEPARIN SODIUM 1800 UNITS/HR: 1000 INJECTION INTRAVENOUS; SUBCUTANEOUS at 04:25

## 2022-01-01 RX ADMIN — Medication 2 PACKET: at 16:56

## 2022-01-01 RX ADMIN — SODIUM CHLORIDE SOLN NEBU 3% 2 ML: 3 NEBU SOLN at 19:05

## 2022-01-01 RX ADMIN — Medication 2 PACKET: at 15:45

## 2022-01-01 RX ADMIN — MIDAZOLAM 4 MG: 1 INJECTION INTRAMUSCULAR; INTRAVENOUS at 09:29

## 2022-01-01 RX ADMIN — HEPARIN SODIUM 2550 UNITS/HR: 1000 INJECTION INTRAVENOUS; SUBCUTANEOUS at 12:29

## 2022-01-01 RX ADMIN — POTASSIUM CHLORIDE 40 MEQ: 1.5 POWDER, FOR SOLUTION ORAL at 08:07

## 2022-01-01 RX ADMIN — ACETYLCYSTEINE 1 ML: 200 SOLUTION ORAL; RESPIRATORY (INHALATION) at 12:20

## 2022-01-01 RX ADMIN — PROPOFOL 70 MCG/KG/MIN: 10 INJECTION, EMULSION INTRAVENOUS at 06:02

## 2022-01-01 RX ADMIN — ALBUMIN HUMAN 12.5 G: 0.25 SOLUTION INTRAVENOUS at 12:15

## 2022-01-01 RX ADMIN — AMPICILLIN SODIUM 2 G: 2 INJECTION, POWDER, FOR SOLUTION INTRAVENOUS at 08:04

## 2022-01-01 RX ADMIN — PROPOFOL 15 MCG/KG/MIN: 10 INJECTION, EMULSION INTRAVENOUS at 00:10

## 2022-01-01 RX ADMIN — SODIUM CHLORIDE SOLN NEBU 3% 3 ML: 3 NEBU SOLN at 00:28

## 2022-01-01 RX ADMIN — INSULIN ASPART 1 UNITS: 100 INJECTION, SOLUTION INTRAVENOUS; SUBCUTANEOUS at 08:02

## 2022-01-01 RX ADMIN — MICONAZOLE NITRATE: 2 POWDER TOPICAL at 10:24

## 2022-01-01 RX ADMIN — OXYCODONE HYDROCHLORIDE 10 MG: 5 SOLUTION ORAL at 08:21

## 2022-01-01 RX ADMIN — INSULIN ASPART 1 UNITS: 100 INJECTION, SOLUTION INTRAVENOUS; SUBCUTANEOUS at 00:21

## 2022-01-01 RX ADMIN — DEXMEDETOMIDINE HYDROCHLORIDE 0.9 MCG/KG/HR: 400 INJECTION INTRAVENOUS at 12:02

## 2022-01-01 RX ADMIN — MAGNESIUM SULFATE HEPTAHYDRATE 2 G: 40 INJECTION, SOLUTION INTRAVENOUS at 08:41

## 2022-01-01 RX ADMIN — HEPARIN SODIUM 2550 UNITS/HR: 1000 INJECTION INTRAVENOUS; SUBCUTANEOUS at 05:48

## 2022-01-01 RX ADMIN — PROPOFOL 10 MCG/KG/MIN: 10 INJECTION, EMULSION INTRAVENOUS at 18:03

## 2022-01-01 RX ADMIN — Medication 2 PACKET: at 08:50

## 2022-01-01 RX ADMIN — HEPARIN SODIUM 2550 UNITS/HR: 1000 INJECTION INTRAVENOUS; SUBCUTANEOUS at 05:53

## 2022-01-01 RX ADMIN — FUROSEMIDE 40 MG: 10 INJECTION, SOLUTION INTRAVENOUS at 02:34

## 2022-01-01 RX ADMIN — PIPERACILLIN SODIUM AND TAZOBACTAM SODIUM 4.5 G: 4; .5 INJECTION, POWDER, LYOPHILIZED, FOR SOLUTION INTRAVENOUS at 09:37

## 2022-01-01 RX ADMIN — HEPARIN SODIUM 5000 UNITS: 5000 INJECTION INTRAVENOUS; SUBCUTANEOUS at 23:59

## 2022-01-01 RX ADMIN — SENNOSIDES 5 ML: 8.8 LIQUID ORAL at 21:13

## 2022-01-01 RX ADMIN — MIDAZOLAM 2 MG: 1 INJECTION INTRAMUSCULAR; INTRAVENOUS at 09:07

## 2022-01-01 RX ADMIN — DEXMEDETOMIDINE HYDROCHLORIDE 0.6 MCG/KG/HR: 400 INJECTION INTRAVENOUS at 01:37

## 2022-01-01 RX ADMIN — ACETYLCYSTEINE 1 ML: 200 SOLUTION ORAL; RESPIRATORY (INHALATION) at 07:10

## 2022-01-01 RX ADMIN — ASPIRIN 81 MG CHEWABLE TABLET 81 MG: 81 TABLET CHEWABLE at 08:40

## 2022-01-01 RX ADMIN — NYSTATIN: 100000 OINTMENT TOPICAL at 00:12

## 2022-01-01 RX ADMIN — DORNASE ALFA 50 ML: 1 SOLUTION RESPIRATORY (INHALATION) at 15:01

## 2022-01-01 RX ADMIN — ALBUTEROL SULFATE 2.5 MG: 2.5 SOLUTION RESPIRATORY (INHALATION) at 19:03

## 2022-01-01 RX ADMIN — HYDROCORTISONE SODIUM SUCCINATE 50 MG: 100 INJECTION, POWDER, FOR SOLUTION INTRAMUSCULAR; INTRAVENOUS at 04:30

## 2022-01-01 RX ADMIN — DEXMEDETOMIDINE HYDROCHLORIDE 0.8 MCG/KG/HR: 400 INJECTION INTRAVENOUS at 00:35

## 2022-01-01 RX ADMIN — POTASSIUM CHLORIDE 40 MEQ: 1.5 POWDER, FOR SOLUTION ORAL at 21:37

## 2022-01-01 RX ADMIN — POTASSIUM CHLORIDE 20 MEQ: 1.5 POWDER, FOR SOLUTION ORAL at 10:26

## 2022-01-01 RX ADMIN — ACETYLCYSTEINE 1 ML: 200 SOLUTION ORAL; RESPIRATORY (INHALATION) at 07:15

## 2022-01-01 RX ADMIN — HEPARIN SODIUM 5000 UNITS: 5000 INJECTION INTRAVENOUS; SUBCUTANEOUS at 16:12

## 2022-01-01 RX ADMIN — ACETYLCYSTEINE 1 ML: 200 SOLUTION ORAL; RESPIRATORY (INHALATION) at 13:09

## 2022-01-01 RX ADMIN — Medication 40 MG: at 08:59

## 2022-01-01 RX ADMIN — SODIUM CHLORIDE 10 UNITS: 9 INJECTION, SOLUTION INTRAVENOUS at 23:07

## 2022-01-01 RX ADMIN — AMIODARONE HYDROCHLORIDE 400 MG: 200 TABLET ORAL at 08:52

## 2022-01-01 RX ADMIN — OXYCODONE HYDROCHLORIDE 5 MG: 5 SOLUTION ORAL at 14:02

## 2022-01-01 RX ADMIN — Medication 1 CAPSULE: at 08:51

## 2022-01-01 RX ADMIN — DEXMEDETOMIDINE HYDROCHLORIDE 0.7 MCG/KG/HR: 400 INJECTION INTRAVENOUS at 15:04

## 2022-01-01 RX ADMIN — PROPOFOL 15 MCG/KG/MIN: 10 INJECTION, EMULSION INTRAVENOUS at 10:24

## 2022-01-01 RX ADMIN — AMIODARONE HYDROCHLORIDE 400 MG: 200 TABLET ORAL at 08:21

## 2022-01-01 RX ADMIN — SODIUM CHLORIDE SOLN NEBU 3% 3 ML: 3 NEBU SOLN at 07:45

## 2022-01-01 RX ADMIN — Medication 1 CAPSULE: at 08:07

## 2022-01-01 RX ADMIN — AMIODARONE HYDROCHLORIDE 400 MG: 200 TABLET ORAL at 08:07

## 2022-01-01 RX ADMIN — CHLORHEXIDINE GLUCONATE 15 ML: 1.2 SOLUTION ORAL at 08:38

## 2022-01-01 RX ADMIN — NYSTATIN: 100000 OINTMENT TOPICAL at 11:19

## 2022-01-01 RX ADMIN — AMPICILLIN SODIUM 2 G: 2 INJECTION, POWDER, FOR SOLUTION INTRAVENOUS at 17:43

## 2022-01-01 RX ADMIN — EPOPROSTENOL 20 NG/KG/MIN: 1.5 INJECTION, POWDER, LYOPHILIZED, FOR SOLUTION INTRAVENOUS at 01:17

## 2022-01-01 RX ADMIN — MICONAZOLE NITRATE: 2 POWDER TOPICAL at 09:04

## 2022-01-01 RX ADMIN — FUROSEMIDE 20 MG: 10 INJECTION, SOLUTION INTRAVENOUS at 23:06

## 2022-01-01 RX ADMIN — DEXMEDETOMIDINE HYDROCHLORIDE 0.2 MCG/KG/HR: 400 INJECTION INTRAVENOUS at 06:35

## 2022-01-01 RX ADMIN — SODIUM CHLORIDE 78 ML: 900 INJECTION INTRAVENOUS at 14:45

## 2022-01-01 RX ADMIN — DULOXETINE 60 MG: 60 CAPSULE, DELAYED RELEASE ORAL at 16:07

## 2022-01-01 RX ADMIN — ASPIRIN 81 MG CHEWABLE TABLET 81 MG: 81 TABLET CHEWABLE at 08:11

## 2022-01-01 RX ADMIN — HYDROCORTISONE SODIUM SUCCINATE 50 MG: 100 INJECTION, POWDER, FOR SOLUTION INTRAMUSCULAR; INTRAVENOUS at 17:55

## 2022-01-01 RX ADMIN — HEPARIN SODIUM 2250 UNITS/HR: 1000 INJECTION INTRAVENOUS; SUBCUTANEOUS at 13:02

## 2022-01-01 RX ADMIN — HEPARIN SODIUM 2550 UNITS/HR: 1000 INJECTION INTRAVENOUS; SUBCUTANEOUS at 23:41

## 2022-01-01 RX ADMIN — SODIUM CHLORIDE, POTASSIUM CHLORIDE, SODIUM LACTATE AND CALCIUM CHLORIDE 500 ML: 600; 310; 30; 20 INJECTION, SOLUTION INTRAVENOUS at 09:00

## 2022-01-01 RX ADMIN — NYSTATIN: 100000 OINTMENT TOPICAL at 12:24

## 2022-01-01 RX ADMIN — MAGNESIUM SULFATE HEPTAHYDRATE 2 G: 40 INJECTION, SOLUTION INTRAVENOUS at 05:48

## 2022-01-01 RX ADMIN — MICONAZOLE NITRATE: 2 POWDER TOPICAL at 20:00

## 2022-01-01 RX ADMIN — ALBUTEROL SULFATE 2.5 MG: 2.5 SOLUTION RESPIRATORY (INHALATION) at 12:00

## 2022-01-01 RX ADMIN — SODIUM CHLORIDE SOLN NEBU 3% 3 ML: 3 NEBU SOLN at 19:55

## 2022-01-01 RX ADMIN — HEPARIN SODIUM 2400 UNITS/HR: 1000 INJECTION INTRAVENOUS; SUBCUTANEOUS at 12:53

## 2022-01-01 RX ADMIN — Medication 2 SPRAY: at 13:48

## 2022-01-01 RX ADMIN — Medication 2 PACKET: at 21:34

## 2022-01-01 RX ADMIN — PROPOFOL 50 MCG/KG/MIN: 10 INJECTION, EMULSION INTRAVENOUS at 17:48

## 2022-01-01 RX ADMIN — ASPIRIN 81 MG CHEWABLE TABLET 81 MG: 81 TABLET CHEWABLE at 08:52

## 2022-01-01 RX ADMIN — EPOPROSTENOL 10 NG/KG/MIN: 1.5 INJECTION, POWDER, LYOPHILIZED, FOR SOLUTION INTRAVENOUS at 01:47

## 2022-01-01 RX ADMIN — Medication 2 PACKET: at 08:17

## 2022-01-01 RX ADMIN — Medication 1 CAPSULE: at 20:20

## 2022-01-01 RX ADMIN — NYSTATIN: 100000 OINTMENT TOPICAL at 11:18

## 2022-01-01 RX ADMIN — MIDAZOLAM HYDROCHLORIDE 8 MG/HR: 1 INJECTION, SOLUTION INTRAVENOUS at 18:50

## 2022-01-01 RX ADMIN — HEPARIN SODIUM 5000 UNITS: 5000 INJECTION INTRAVENOUS; SUBCUTANEOUS at 09:33

## 2022-01-01 RX ADMIN — PROPOFOL 50 MCG/KG/MIN: 10 INJECTION, EMULSION INTRAVENOUS at 06:39

## 2022-01-01 RX ADMIN — SENNOSIDES 5 ML: 8.8 LIQUID ORAL at 08:49

## 2022-01-01 RX ADMIN — ALBUTEROL SULFATE 2.5 MG: 2.5 SOLUTION RESPIRATORY (INHALATION) at 19:05

## 2022-01-01 RX ADMIN — NYSTATIN: 100000 OINTMENT TOPICAL at 09:57

## 2022-01-01 RX ADMIN — ACETYLCYSTEINE 1 ML: 200 SOLUTION ORAL; RESPIRATORY (INHALATION) at 13:29

## 2022-01-01 RX ADMIN — OXYCODONE HYDROCHLORIDE 10 MG: 5 SOLUTION ORAL at 03:46

## 2022-01-01 RX ADMIN — ALBUTEROL SULFATE 2.5 MG: 2.5 SOLUTION RESPIRATORY (INHALATION) at 00:19

## 2022-01-01 RX ADMIN — Medication 1 CAPSULE: at 20:35

## 2022-01-01 RX ADMIN — PIPERACILLIN AND TAZOBACTAM 4.5 G: 4; .5 INJECTION, POWDER, FOR SOLUTION INTRAVENOUS at 08:47

## 2022-01-01 RX ADMIN — VANCOMYCIN HYDROCHLORIDE 1000 MG: 1 INJECTION, SOLUTION INTRAVENOUS at 12:16

## 2022-01-01 RX ADMIN — Medication 40 MG: at 07:31

## 2022-01-01 RX ADMIN — MULTIVIT AND MINERALS-FERROUS GLUCONATE 9 MG IRON/15 ML ORAL LIQUID 15 ML: at 08:07

## 2022-01-01 RX ADMIN — HEPARIN SODIUM 5000 UNITS: 5000 INJECTION INTRAVENOUS; SUBCUTANEOUS at 00:56

## 2022-01-01 RX ADMIN — NYSTATIN: 100000 OINTMENT TOPICAL at 11:26

## 2022-01-01 RX ADMIN — INSULIN ASPART 1 UNITS: 100 INJECTION, SOLUTION INTRAVENOUS; SUBCUTANEOUS at 04:24

## 2022-01-01 RX ADMIN — INSULIN ASPART 1 UNITS: 100 INJECTION, SOLUTION INTRAVENOUS; SUBCUTANEOUS at 05:02

## 2022-01-01 RX ADMIN — FUROSEMIDE 40 MG: 10 INJECTION, SOLUTION INTRAVENOUS at 08:42

## 2022-01-01 RX ADMIN — ACETYLCYSTEINE 1 ML: 200 SOLUTION ORAL; RESPIRATORY (INHALATION) at 00:17

## 2022-01-01 RX ADMIN — NOREPINEPHRINE BITARTRATE 4 MG/250 ML (16 MCG/ML) IN 0.9 % NACL IV 0.05 MCG/KG/MIN: at 20:10

## 2022-01-01 RX ADMIN — HEPARIN SODIUM 2400 UNITS/HR: 1000 INJECTION INTRAVENOUS; SUBCUTANEOUS at 17:00

## 2022-01-01 RX ADMIN — Medication 40 MG: at 06:51

## 2022-01-01 RX ADMIN — SENNOSIDES 5 ML: 8.8 LIQUID ORAL at 20:14

## 2022-01-01 RX ADMIN — AMIODARONE HYDROCHLORIDE 400 MG: 200 TABLET ORAL at 10:51

## 2022-01-01 RX ADMIN — EPOPROSTENOL 20 NG/KG/MIN: 1.5 INJECTION, POWDER, LYOPHILIZED, FOR SOLUTION INTRAVENOUS at 17:46

## 2022-01-01 RX ADMIN — Medication 40 MG: at 08:44

## 2022-01-01 RX ADMIN — OXYCODONE HYDROCHLORIDE 10 MG: 5 SOLUTION ORAL at 08:46

## 2022-01-01 RX ADMIN — PROPOFOL 60 MCG/KG/MIN: 10 INJECTION, EMULSION INTRAVENOUS at 12:38

## 2022-01-01 RX ADMIN — HYDROCORTISONE SODIUM SUCCINATE 50 MG: 100 INJECTION, POWDER, FOR SOLUTION INTRAMUSCULAR; INTRAVENOUS at 20:57

## 2022-01-01 RX ADMIN — ACETYLCYSTEINE 1 ML: 200 SOLUTION ORAL; RESPIRATORY (INHALATION) at 19:57

## 2022-01-01 RX ADMIN — OXYCODONE HYDROCHLORIDE 10 MG: 5 SOLUTION ORAL at 04:17

## 2022-01-01 RX ADMIN — HEPARIN SODIUM 2550 UNITS/HR: 1000 INJECTION INTRAVENOUS; SUBCUTANEOUS at 13:35

## 2022-01-01 RX ADMIN — POLYETHYLENE GLYCOL 3350 17 G: 17 POWDER, FOR SOLUTION ORAL at 08:46

## 2022-01-01 RX ADMIN — ALBUMIN HUMAN 12.5 G: 0.25 SOLUTION INTRAVENOUS at 23:48

## 2022-01-01 RX ADMIN — Medication 2 PACKET: at 10:47

## 2022-01-01 RX ADMIN — ACETYLCYSTEINE 1 ML: 200 SOLUTION ORAL; RESPIRATORY (INHALATION) at 12:40

## 2022-01-01 RX ADMIN — OXYCODONE HYDROCHLORIDE 10 MG: 5 SOLUTION ORAL at 13:39

## 2022-01-01 RX ADMIN — INSULIN ASPART 1 UNITS: 100 INJECTION, SOLUTION INTRAVENOUS; SUBCUTANEOUS at 16:10

## 2022-01-01 RX ADMIN — Medication 2 PACKET: at 21:45

## 2022-01-01 RX ADMIN — METOLAZONE 5 MG: 5 TABLET ORAL at 09:02

## 2022-01-01 RX ADMIN — PIPERACILLIN SODIUM AND TAZOBACTAM SODIUM 4.5 G: 4; .5 INJECTION, POWDER, LYOPHILIZED, FOR SOLUTION INTRAVENOUS at 17:00

## 2022-01-01 RX ADMIN — HYDROCORTISONE SODIUM SUCCINATE 50 MG: 100 INJECTION, POWDER, FOR SOLUTION INTRAMUSCULAR; INTRAVENOUS at 10:36

## 2022-01-01 RX ADMIN — NYSTATIN: 100000 OINTMENT TOPICAL at 21:14

## 2022-01-01 RX ADMIN — PIPERACILLIN SODIUM AND TAZOBACTAM SODIUM 4.5 G: 4; .5 INJECTION, POWDER, LYOPHILIZED, FOR SOLUTION INTRAVENOUS at 20:21

## 2022-01-01 RX ADMIN — Medication 1 CAPSULE: at 21:46

## 2022-01-01 RX ADMIN — POTASSIUM CHLORIDE 20 MEQ: 1.5 POWDER, FOR SOLUTION ORAL at 09:01

## 2022-01-01 RX ADMIN — NOREPINEPHRINE BITARTRATE 4 MG/250 ML (16 MCG/ML) IN 0.9 % NACL IV 0.15 MCG/KG/MIN: at 14:04

## 2022-01-01 RX ADMIN — MULTIVIT AND MINERALS-FERROUS GLUCONATE 9 MG IRON/15 ML ORAL LIQUID 15 ML: at 09:21

## 2022-01-01 RX ADMIN — POTASSIUM CHLORIDE 40 MEQ: 1.5 POWDER, FOR SOLUTION ORAL at 00:55

## 2022-01-01 RX ADMIN — AMPICILLIN SODIUM 2 G: 2 INJECTION, POWDER, FOR SOLUTION INTRAVENOUS at 21:07

## 2022-01-01 RX ADMIN — INSULIN ASPART 1 UNITS: 100 INJECTION, SOLUTION INTRAVENOUS; SUBCUTANEOUS at 09:15

## 2022-01-01 RX ADMIN — NOREPINEPHRINE BITARTRATE 4 MG/250 ML (16 MCG/ML) IN 0.9 % NACL IV 0.12 MCG/KG/MIN: at 21:53

## 2022-01-01 RX ADMIN — Medication 2 PACKET: at 15:59

## 2022-01-01 RX ADMIN — PROPOFOL 70 MCG/KG/MIN: 10 INJECTION, EMULSION INTRAVENOUS at 10:04

## 2022-01-01 RX ADMIN — ACETYLCYSTEINE 1 ML: 200 SOLUTION ORAL; RESPIRATORY (INHALATION) at 19:28

## 2022-01-01 RX ADMIN — Medication 2 PACKET: at 15:43

## 2022-01-01 RX ADMIN — SODIUM CHLORIDE: 9 INJECTION, SOLUTION INTRAVENOUS at 06:57

## 2022-01-01 RX ADMIN — OXYCODONE HYDROCHLORIDE 5 MG: 5 SOLUTION ORAL at 03:30

## 2022-01-01 RX ADMIN — Medication 40 MG: at 09:01

## 2022-01-01 RX ADMIN — FENTANYL CITRATE 100 MCG: 50 INJECTION, SOLUTION INTRAMUSCULAR; INTRAVENOUS at 16:51

## 2022-01-01 RX ADMIN — NOREPINEPHRINE BITARTRATE 4 MG/250 ML (16 MCG/ML) IN 0.9 % NACL IV 0.1 MCG/KG/MIN: at 04:05

## 2022-01-01 RX ADMIN — MAGNESIUM SULFATE HEPTAHYDRATE 2 G: 40 INJECTION, SOLUTION INTRAVENOUS at 13:56

## 2022-01-01 RX ADMIN — ALBUTEROL SULFATE 2.5 MG: 2.5 SOLUTION RESPIRATORY (INHALATION) at 07:15

## 2022-01-01 RX ADMIN — VANCOMYCIN HYDROCHLORIDE 1250 MG: 5 INJECTION, POWDER, LYOPHILIZED, FOR SOLUTION INTRAVENOUS at 21:33

## 2022-01-01 RX ADMIN — ALBUTEROL SULFATE 2.5 MG: 2.5 SOLUTION RESPIRATORY (INHALATION) at 19:55

## 2022-01-01 RX ADMIN — PIPERACILLIN AND TAZOBACTAM 4.5 G: 4; .5 INJECTION, POWDER, FOR SOLUTION INTRAVENOUS at 02:37

## 2022-01-01 RX ADMIN — PIPERACILLIN SODIUM AND TAZOBACTAM SODIUM 4.5 G: 4; .5 INJECTION, POWDER, LYOPHILIZED, FOR SOLUTION INTRAVENOUS at 08:01

## 2022-01-01 RX ADMIN — OXYCODONE HYDROCHLORIDE 5 MG: 5 SOLUTION ORAL at 18:40

## 2022-01-01 RX ADMIN — ALBUTEROL SULFATE 2.5 MG: 2.5 SOLUTION RESPIRATORY (INHALATION) at 07:44

## 2022-01-01 RX ADMIN — HEPARIN SODIUM 3000 UNITS/HR: 1000 INJECTION INTRAVENOUS; SUBCUTANEOUS at 15:32

## 2022-01-01 RX ADMIN — ACETYLCYSTEINE 1 ML: 200 SOLUTION ORAL; RESPIRATORY (INHALATION) at 00:09

## 2022-01-01 RX ADMIN — SODIUM CHLORIDE 79 ML: 900 INJECTION INTRAVENOUS at 17:03

## 2022-01-01 RX ADMIN — ACETYLCYSTEINE 1 ML: 200 SOLUTION ORAL; RESPIRATORY (INHALATION) at 07:14

## 2022-01-01 RX ADMIN — MIDAZOLAM HYDROCHLORIDE 8 MG/HR: 1 INJECTION, SOLUTION INTRAVENOUS at 00:27

## 2022-01-01 RX ADMIN — POTASSIUM CHLORIDE 40 MEQ: 20 SOLUTION ORAL at 11:35

## 2022-01-01 RX ADMIN — EPOPROSTENOL 20 NG/KG/MIN: 1.5 INJECTION, POWDER, LYOPHILIZED, FOR SOLUTION INTRAVENOUS at 12:31

## 2022-01-01 RX ADMIN — HYDROCORTISONE SODIUM SUCCINATE 50 MG: 100 INJECTION, POWDER, FOR SOLUTION INTRAMUSCULAR; INTRAVENOUS at 04:05

## 2022-01-01 RX ADMIN — Medication 2 PACKET: at 16:29

## 2022-01-01 RX ADMIN — METOPROLOL TARTRATE 25 MG: 25 TABLET, FILM COATED ORAL at 08:48

## 2022-01-01 RX ADMIN — Medication 1 CAPSULE: at 20:03

## 2022-01-01 RX ADMIN — OXYCODONE HYDROCHLORIDE 7.5 MG: 5 SOLUTION ORAL at 00:00

## 2022-01-01 RX ADMIN — FENTANYL CITRATE 50 MCG: 50 INJECTION, SOLUTION INTRAMUSCULAR; INTRAVENOUS at 03:50

## 2022-01-01 RX ADMIN — CHLORHEXIDINE GLUCONATE 15 ML: 1.2 SOLUTION ORAL at 08:52

## 2022-01-01 RX ADMIN — ALBUTEROL SULFATE 2.5 MG: 2.5 SOLUTION RESPIRATORY (INHALATION) at 07:33

## 2022-01-01 RX ADMIN — INSULIN ASPART 1 UNITS: 100 INJECTION, SOLUTION INTRAVENOUS; SUBCUTANEOUS at 03:56

## 2022-01-01 RX ADMIN — NYSTATIN: 100000 OINTMENT TOPICAL at 20:47

## 2022-01-01 RX ADMIN — ALBUTEROL SULFATE 2.5 MG: 2.5 SOLUTION RESPIRATORY (INHALATION) at 02:39

## 2022-01-01 RX ADMIN — MICONAZOLE NITRATE: 2 POWDER TOPICAL at 09:02

## 2022-01-01 RX ADMIN — MIDAZOLAM HYDROCHLORIDE 4 MG/HR: 1 INJECTION, SOLUTION INTRAVENOUS at 09:32

## 2022-01-01 RX ADMIN — ALBUTEROL SULFATE 2.5 MG: 2.5 SOLUTION RESPIRATORY (INHALATION) at 13:45

## 2022-01-01 RX ADMIN — PIPERACILLIN SODIUM AND TAZOBACTAM SODIUM 4.5 G: 4; .5 INJECTION, POWDER, LYOPHILIZED, FOR SOLUTION INTRAVENOUS at 14:07

## 2022-01-01 RX ADMIN — MULTIVIT AND MINERALS-FERROUS GLUCONATE 9 MG IRON/15 ML ORAL LIQUID 15 ML: at 08:39

## 2022-01-01 RX ADMIN — ALBUTEROL SULFATE 2.5 MG: 2.5 SOLUTION RESPIRATORY (INHALATION) at 00:11

## 2022-01-01 RX ADMIN — LOSARTAN POTASSIUM AND HYDROCHLOROTHIAZIDE 1 TABLET: 50; 12.5 TABLET, FILM COATED ORAL at 09:37

## 2022-01-01 RX ADMIN — OXYCODONE HYDROCHLORIDE 10 MG: 5 SOLUTION ORAL at 01:44

## 2022-01-01 RX ADMIN — SENNOSIDES 5 ML: 8.8 LIQUID ORAL at 09:09

## 2022-01-01 RX ADMIN — HYDROCORTISONE SODIUM SUCCINATE 50 MG: 100 INJECTION, POWDER, FOR SOLUTION INTRAMUSCULAR; INTRAVENOUS at 12:26

## 2022-01-01 RX ADMIN — ALBUTEROL SULFATE 2.5 MG: 2.5 SOLUTION RESPIRATORY (INHALATION) at 07:09

## 2022-01-01 RX ADMIN — EPOPROSTENOL 20 NG/KG/MIN: 1.5 INJECTION, POWDER, LYOPHILIZED, FOR SOLUTION INTRAVENOUS at 11:52

## 2022-01-01 RX ADMIN — INSULIN ASPART 1 UNITS: 100 INJECTION, SOLUTION INTRAVENOUS; SUBCUTANEOUS at 23:47

## 2022-01-01 RX ADMIN — METOPROLOL TARTRATE 25 MG: 25 TABLET, FILM COATED ORAL at 21:07

## 2022-01-01 RX ADMIN — EPOPROSTENOL 20 NG/KG/MIN: 1.5 INJECTION, POWDER, LYOPHILIZED, FOR SOLUTION INTRAVENOUS at 06:47

## 2022-01-01 RX ADMIN — Medication 2 PACKET: at 21:37

## 2022-01-01 RX ADMIN — PROPOFOL 10 MCG/KG/MIN: 10 INJECTION, EMULSION INTRAVENOUS at 06:59

## 2022-01-01 RX ADMIN — ALBUTEROL SULFATE 2.5 MG: 2.5 SOLUTION RESPIRATORY (INHALATION) at 19:37

## 2022-01-01 RX ADMIN — Medication 100 MCG: at 11:17

## 2022-01-01 RX ADMIN — ASPIRIN 81 MG CHEWABLE TABLET 81 MG: 81 TABLET CHEWABLE at 10:53

## 2022-01-01 RX ADMIN — POTASSIUM CHLORIDE 40 MEQ: 20 SOLUTION ORAL at 20:15

## 2022-01-01 RX ADMIN — HYDROCORTISONE SODIUM SUCCINATE 50 MG: 100 INJECTION, POWDER, FOR SOLUTION INTRAMUSCULAR; INTRAVENOUS at 12:17

## 2022-01-01 RX ADMIN — MICONAZOLE NITRATE: 2 POWDER TOPICAL at 08:42

## 2022-01-01 RX ADMIN — POTASSIUM CHLORIDE 40 MEQ: 1.5 POWDER, FOR SOLUTION ORAL at 17:42

## 2022-01-01 RX ADMIN — Medication 40 MG: at 07:56

## 2022-01-01 RX ADMIN — NYSTATIN: 100000 OINTMENT TOPICAL at 22:46

## 2022-01-01 RX ADMIN — AMPICILLIN SODIUM 2 G: 2 INJECTION, POWDER, FOR SOLUTION INTRAVENOUS at 02:05

## 2022-01-01 RX ADMIN — HEPARIN SODIUM 1650 UNITS/HR: 1000 INJECTION INTRAVENOUS; SUBCUTANEOUS at 16:24

## 2022-01-01 RX ADMIN — MEROPENEM 1 G: 1 INJECTION, POWDER, FOR SOLUTION INTRAVENOUS at 13:36

## 2022-01-01 RX ADMIN — AMIODARONE HYDROCHLORIDE 400 MG: 200 TABLET ORAL at 09:37

## 2022-01-01 RX ADMIN — PROPOFOL 100 MG: 10 INJECTION, EMULSION INTRAVENOUS at 04:12

## 2022-01-01 RX ADMIN — PROPOFOL 15 MCG/KG/MIN: 10 INJECTION, EMULSION INTRAVENOUS at 03:06

## 2022-01-01 RX ADMIN — Medication 2 SPRAY: at 13:08

## 2022-01-01 RX ADMIN — CHLORHEXIDINE GLUCONATE 15 ML: 1.2 SOLUTION ORAL at 07:50

## 2022-01-01 RX ADMIN — Medication 1 CAPSULE: at 08:48

## 2022-01-01 RX ADMIN — NYSTATIN: 100000 OINTMENT TOPICAL at 21:38

## 2022-01-01 RX ADMIN — Medication 1 CAPSULE: at 21:58

## 2022-01-01 RX ADMIN — NOREPINEPHRINE BITARTRATE 4 MG/250 ML (16 MCG/ML) IN 0.9 % NACL IV 0.12 MCG/KG/MIN: at 17:30

## 2022-01-01 RX ADMIN — DEXMEDETOMIDINE HYDROCHLORIDE 0.6 MCG/KG/HR: 400 INJECTION INTRAVENOUS at 16:11

## 2022-01-01 RX ADMIN — HEPARIN SODIUM 2400 UNITS/HR: 1000 INJECTION INTRAVENOUS; SUBCUTANEOUS at 07:30

## 2022-01-01 RX ADMIN — AMIODARONE HYDROCHLORIDE 400 MG: 200 TABLET ORAL at 09:21

## 2022-01-01 RX ADMIN — MULTIVIT AND MINERALS-FERROUS GLUCONATE 9 MG IRON/15 ML ORAL LIQUID 15 ML: at 08:17

## 2022-01-01 RX ADMIN — EPOPROSTENOL 10 NG/KG/MIN: 1.5 INJECTION, POWDER, LYOPHILIZED, FOR SOLUTION INTRAVENOUS at 13:12

## 2022-01-01 RX ADMIN — SODIUM CHLORIDE, POTASSIUM CHLORIDE, SODIUM LACTATE AND CALCIUM CHLORIDE 500 ML: 600; 310; 30; 20 INJECTION, SOLUTION INTRAVENOUS at 04:20

## 2022-01-01 RX ADMIN — AMIODARONE HYDROCHLORIDE 400 MG: 200 TABLET ORAL at 09:01

## 2022-01-01 RX ADMIN — ALBUTEROL SULFATE 2.5 MG: 2.5 SOLUTION RESPIRATORY (INHALATION) at 15:31

## 2022-01-01 RX ADMIN — HYDROCORTISONE SODIUM SUCCINATE 50 MG: 100 INJECTION, POWDER, FOR SOLUTION INTRAMUSCULAR; INTRAVENOUS at 17:23

## 2022-01-01 RX ADMIN — PROPOFOL 15 MCG/KG/MIN: 10 INJECTION, EMULSION INTRAVENOUS at 22:29

## 2022-01-01 RX ADMIN — METOPROLOL TARTRATE 25 MG: 25 TABLET, FILM COATED ORAL at 08:55

## 2022-01-01 RX ADMIN — POTASSIUM CHLORIDE 40 MEQ: 1.5 POWDER, FOR SOLUTION ORAL at 19:50

## 2022-01-01 RX ADMIN — ALBUTEROL SULFATE 2.5 MG: 2.5 SOLUTION RESPIRATORY (INHALATION) at 02:09

## 2022-01-01 RX ADMIN — POTASSIUM CHLORIDE 40 MEQ: 20 SOLUTION ORAL at 17:56

## 2022-01-01 RX ADMIN — ASPIRIN 81 MG CHEWABLE TABLET 81 MG: 81 TABLET CHEWABLE at 08:44

## 2022-01-01 RX ADMIN — FUROSEMIDE 20 MG: 10 INJECTION, SOLUTION INTRAVENOUS at 10:41

## 2022-01-01 RX ADMIN — SENNOSIDES 5 ML: 8.8 LIQUID ORAL at 08:47

## 2022-01-01 RX ADMIN — PROPOFOL 5 MCG/KG/MIN: 10 INJECTION, EMULSION INTRAVENOUS at 09:13

## 2022-01-01 RX ADMIN — MULTIVIT AND MINERALS-FERROUS GLUCONATE 9 MG IRON/15 ML ORAL LIQUID 15 ML: at 09:36

## 2022-01-01 RX ADMIN — MULTIVIT AND MINERALS-FERROUS GLUCONATE 9 MG IRON/15 ML ORAL LIQUID 15 ML: at 09:31

## 2022-01-01 RX ADMIN — Medication 2 PACKET: at 21:38

## 2022-01-01 RX ADMIN — CHLORHEXIDINE GLUCONATE 15 ML: 1.2 SOLUTION ORAL at 19:50

## 2022-01-01 RX ADMIN — HEPARIN SODIUM 2550 UNITS/HR: 1000 INJECTION INTRAVENOUS; SUBCUTANEOUS at 19:31

## 2022-01-01 RX ADMIN — ALBUTEROL SULFATE 2.5 MG: 2.5 SOLUTION RESPIRATORY (INHALATION) at 08:08

## 2022-01-01 RX ADMIN — HEPARIN SODIUM 5000 UNITS: 5000 INJECTION INTRAVENOUS; SUBCUTANEOUS at 00:14

## 2022-01-01 RX ADMIN — HEPARIN SODIUM 2400 UNITS/HR: 1000 INJECTION INTRAVENOUS; SUBCUTANEOUS at 00:23

## 2022-01-01 RX ADMIN — INSULIN ASPART 1 UNITS: 100 INJECTION, SOLUTION INTRAVENOUS; SUBCUTANEOUS at 15:47

## 2022-01-01 RX ADMIN — HYDROCORTISONE SODIUM SUCCINATE 50 MG: 100 INJECTION, POWDER, FOR SOLUTION INTRAMUSCULAR; INTRAVENOUS at 05:59

## 2022-01-01 RX ADMIN — FENTANYL CITRATE 200 MCG/HR: 50 INJECTION INTRAVENOUS at 15:39

## 2022-01-01 RX ADMIN — NOREPINEPHRINE BITARTRATE 4 MG/250 ML (16 MCG/ML) IN 0.9 % NACL IV 0.12 MCG/KG/MIN: at 02:28

## 2022-01-01 RX ADMIN — OXYCODONE HYDROCHLORIDE 10 MG: 5 SOLUTION ORAL at 09:45

## 2022-01-01 RX ADMIN — SODIUM CHLORIDE SOLN NEBU 3% 3 ML: 3 NEBU SOLN at 20:06

## 2022-01-01 RX ADMIN — PROPOFOL 50 MCG/KG/MIN: 10 INJECTION, EMULSION INTRAVENOUS at 22:52

## 2022-01-01 RX ADMIN — ASPIRIN 81 MG CHEWABLE TABLET 81 MG: 81 TABLET CHEWABLE at 09:32

## 2022-01-01 RX ADMIN — SENNOSIDES 5 ML: 8.8 LIQUID ORAL at 20:35

## 2022-01-01 RX ADMIN — MAGNESIUM SULFATE HEPTAHYDRATE 2 G: 40 INJECTION, SOLUTION INTRAVENOUS at 21:34

## 2022-01-01 RX ADMIN — Medication 40 MG: at 08:14

## 2022-01-01 RX ADMIN — HYDROCORTISONE SODIUM SUCCINATE 50 MG: 100 INJECTION, POWDER, FOR SOLUTION INTRAMUSCULAR; INTRAVENOUS at 23:54

## 2022-01-01 RX ADMIN — MICONAZOLE NITRATE: 2 POWDER TOPICAL at 20:41

## 2022-01-01 RX ADMIN — Medication 1 CAPSULE: at 08:44

## 2022-01-01 RX ADMIN — PIPERACILLIN AND TAZOBACTAM 4.5 G: 4; .5 INJECTION, POWDER, FOR SOLUTION INTRAVENOUS at 02:45

## 2022-01-01 RX ADMIN — INSULIN ASPART 1 UNITS: 100 INJECTION, SOLUTION INTRAVENOUS; SUBCUTANEOUS at 13:08

## 2022-01-01 RX ADMIN — PIPERACILLIN SODIUM AND TAZOBACTAM SODIUM 4.5 G: 4; .5 INJECTION, POWDER, LYOPHILIZED, FOR SOLUTION INTRAVENOUS at 14:40

## 2022-01-01 RX ADMIN — NYSTATIN: 100000 OINTMENT TOPICAL at 22:10

## 2022-01-01 RX ADMIN — PIPERACILLIN SODIUM AND TAZOBACTAM SODIUM 4.5 G: 4; .5 INJECTION, POWDER, LYOPHILIZED, FOR SOLUTION INTRAVENOUS at 20:50

## 2022-01-01 RX ADMIN — METOPROLOL TARTRATE 25 MG: 25 TABLET, FILM COATED ORAL at 20:35

## 2022-01-01 RX ADMIN — DEXMEDETOMIDINE HYDROCHLORIDE 0.7 MCG/KG/HR: 400 INJECTION INTRAVENOUS at 11:27

## 2022-01-01 RX ADMIN — MICONAZOLE NITRATE: 2 POWDER TOPICAL at 21:47

## 2022-01-01 RX ADMIN — ALBUMIN HUMAN 12.5 G: 0.25 SOLUTION INTRAVENOUS at 17:23

## 2022-01-01 RX ADMIN — Medication 1 CAPSULE: at 21:07

## 2022-01-01 RX ADMIN — OXYCODONE HYDROCHLORIDE 10 MG: 5 SOLUTION ORAL at 08:19

## 2022-01-01 RX ADMIN — POTASSIUM CHLORIDE 20 MEQ: 1.5 POWDER, FOR SOLUTION ORAL at 00:46

## 2022-01-01 RX ADMIN — Medication 1 CAPSULE: at 20:54

## 2022-01-01 RX ADMIN — SUCCINYLCHOLINE CHLORIDE 100 MG: 20 INJECTION, SOLUTION INTRAMUSCULAR; INTRAVENOUS; PARENTERAL at 04:12

## 2022-01-01 RX ADMIN — Medication 2 PACKET: at 15:41

## 2022-01-01 RX ADMIN — VASOPRESSIN 2.4 UNITS/HR: 20 INJECTION INTRAVENOUS at 10:14

## 2022-01-01 RX ADMIN — ALBUTEROL SULFATE 2.5 MG: 2.5 SOLUTION RESPIRATORY (INHALATION) at 19:40

## 2022-01-01 RX ADMIN — HYDROCORTISONE SODIUM SUCCINATE 50 MG: 100 INJECTION, POWDER, FOR SOLUTION INTRAMUSCULAR; INTRAVENOUS at 05:18

## 2022-01-01 RX ADMIN — Medication 1 CAPSULE: at 08:41

## 2022-01-01 RX ADMIN — CHLORHEXIDINE GLUCONATE 15 ML: 1.2 SOLUTION ORAL at 07:31

## 2022-01-01 RX ADMIN — ACETYLCYSTEINE 1 ML: 200 SOLUTION ORAL; RESPIRATORY (INHALATION) at 00:43

## 2022-01-01 RX ADMIN — IOPAMIDOL 124 ML: 755 INJECTION, SOLUTION INTRAVENOUS at 14:45

## 2022-01-01 RX ADMIN — POTASSIUM CHLORIDE 40 MEQ: 1.5 POWDER, FOR SOLUTION ORAL at 06:25

## 2022-01-01 RX ADMIN — NYSTATIN: 100000 OINTMENT TOPICAL at 23:10

## 2022-01-01 RX ADMIN — AMPICILLIN SODIUM 2 G: 2 INJECTION, POWDER, FOR SOLUTION INTRAVENOUS at 13:42

## 2022-01-01 RX ADMIN — PIPERACILLIN AND TAZOBACTAM 4.5 G: 4; .5 INJECTION, POWDER, FOR SOLUTION INTRAVENOUS at 13:12

## 2022-01-01 RX ADMIN — VASOPRESSIN 2.4 UNITS/HR: 20 INJECTION INTRAVENOUS at 00:56

## 2022-01-01 RX ADMIN — PIPERACILLIN AND TAZOBACTAM 4.5 G: 4; .5 INJECTION, POWDER, FOR SOLUTION INTRAVENOUS at 19:50

## 2022-01-01 RX ADMIN — HEPARIN SODIUM 2550 UNITS/HR: 1000 INJECTION INTRAVENOUS; SUBCUTANEOUS at 08:49

## 2022-01-01 RX ADMIN — ALBUTEROL SULFATE 2.5 MG: 2.5 SOLUTION RESPIRATORY (INHALATION) at 07:11

## 2022-01-01 RX ADMIN — HEPARIN SODIUM 5000 UNITS: 5000 INJECTION INTRAVENOUS; SUBCUTANEOUS at 00:12

## 2022-01-01 RX ADMIN — ACETYLCYSTEINE 1 ML: 200 SOLUTION ORAL; RESPIRATORY (INHALATION) at 07:53

## 2022-01-01 RX ADMIN — MULTIVIT AND MINERALS-FERROUS GLUCONATE 9 MG IRON/15 ML ORAL LIQUID 15 ML: at 08:57

## 2022-01-01 RX ADMIN — DULOXETINE 60 MG: 60 CAPSULE, DELAYED RELEASE ORAL at 04:18

## 2022-01-01 RX ADMIN — PROPOFOL 50 MCG/KG/MIN: 10 INJECTION, EMULSION INTRAVENOUS at 20:08

## 2022-01-01 RX ADMIN — METOPROLOL TARTRATE 25 MG: 25 TABLET, FILM COATED ORAL at 08:21

## 2022-01-01 RX ADMIN — Medication 1 CAPSULE: at 09:10

## 2022-01-01 RX ADMIN — DEXMEDETOMIDINE HYDROCHLORIDE 0.8 MCG/KG/HR: 400 INJECTION INTRAVENOUS at 04:31

## 2022-01-01 RX ADMIN — EPOPROSTENOL 20 NG/KG/MIN: 1.5 INJECTION, POWDER, LYOPHILIZED, FOR SOLUTION INTRAVENOUS at 23:10

## 2022-01-01 RX ADMIN — PIPERACILLIN AND TAZOBACTAM 4.5 G: 4; .5 INJECTION, POWDER, FOR SOLUTION INTRAVENOUS at 01:24

## 2022-01-01 RX ADMIN — PIPERACILLIN AND TAZOBACTAM 4.5 G: 4; .5 INJECTION, POWDER, FOR SOLUTION INTRAVENOUS at 20:36

## 2022-01-01 RX ADMIN — MICONAZOLE NITRATE: 2 POWDER TOPICAL at 00:12

## 2022-01-01 RX ADMIN — HYDROCORTISONE SODIUM SUCCINATE 50 MG: 100 INJECTION, POWDER, FOR SOLUTION INTRAMUSCULAR; INTRAVENOUS at 12:19

## 2022-01-01 RX ADMIN — OXYCODONE HYDROCHLORIDE 10 MG: 5 SOLUTION ORAL at 04:18

## 2022-01-01 RX ADMIN — OXYCODONE HYDROCHLORIDE 10 MG: 5 SOLUTION ORAL at 20:03

## 2022-01-01 RX ADMIN — HEPARIN SODIUM 2550 UNITS/HR: 1000 INJECTION INTRAVENOUS; SUBCUTANEOUS at 01:00

## 2022-01-01 RX ADMIN — NYSTATIN: 100000 OINTMENT TOPICAL at 10:25

## 2022-01-01 RX ADMIN — Medication 2 PACKET: at 15:44

## 2022-01-01 RX ADMIN — HYDROCORTISONE SODIUM SUCCINATE 50 MG: 100 INJECTION, POWDER, FOR SOLUTION INTRAMUSCULAR; INTRAVENOUS at 22:49

## 2022-01-01 RX ADMIN — SODIUM CHLORIDE SOLN NEBU 3% 3 ML: 3 NEBU SOLN at 02:09

## 2022-01-01 RX ADMIN — DEXMEDETOMIDINE HYDROCHLORIDE 0.7 MCG/KG/HR: 400 INJECTION INTRAVENOUS at 07:56

## 2022-01-01 RX ADMIN — AMIODARONE HYDROCHLORIDE 400 MG: 200 TABLET ORAL at 08:09

## 2022-01-01 RX ADMIN — ALBUTEROL SULFATE 2.5 MG: 2.5 SOLUTION RESPIRATORY (INHALATION) at 07:45

## 2022-01-01 RX ADMIN — INSULIN ASPART 1 UNITS: 100 INJECTION, SOLUTION INTRAVENOUS; SUBCUTANEOUS at 04:02

## 2022-01-01 RX ADMIN — HEPARIN SODIUM 5000 UNITS: 5000 INJECTION INTRAVENOUS; SUBCUTANEOUS at 08:24

## 2022-01-01 RX ADMIN — ALBUTEROL SULFATE 2.5 MG: 2.5 SOLUTION RESPIRATORY (INHALATION) at 19:57

## 2022-01-01 RX ADMIN — ACETYLCYSTEINE 1 ML: 200 SOLUTION ORAL; RESPIRATORY (INHALATION) at 02:39

## 2022-01-01 RX ADMIN — Medication 1 CAPSULE: at 11:55

## 2022-01-01 RX ADMIN — SODIUM CHLORIDE SOLN NEBU 3% 3 ML: 3 NEBU SOLN at 07:15

## 2022-01-01 RX ADMIN — PIPERACILLIN AND TAZOBACTAM 4.5 G: 4; .5 INJECTION, POWDER, FOR SOLUTION INTRAVENOUS at 21:13

## 2022-01-01 RX ADMIN — ACETYLCYSTEINE 1 ML: 200 SOLUTION ORAL; RESPIRATORY (INHALATION) at 19:04

## 2022-01-01 RX ADMIN — HYDROCORTISONE SODIUM SUCCINATE 50 MG: 100 INJECTION, POWDER, FOR SOLUTION INTRAMUSCULAR; INTRAVENOUS at 23:06

## 2022-01-01 RX ADMIN — MEROPENEM 1 G: 1 INJECTION, POWDER, FOR SOLUTION INTRAVENOUS at 08:11

## 2022-01-01 RX ADMIN — PIPERACILLIN SODIUM AND TAZOBACTAM SODIUM 4.5 G: 4; .5 INJECTION, POWDER, LYOPHILIZED, FOR SOLUTION INTRAVENOUS at 07:57

## 2022-01-01 RX ADMIN — Medication 1 CAPSULE: at 21:34

## 2022-01-01 RX ADMIN — MICONAZOLE NITRATE: 2 POWDER TOPICAL at 10:48

## 2022-01-01 RX ADMIN — PIPERACILLIN AND TAZOBACTAM 4.5 G: 4; .5 INJECTION, POWDER, FOR SOLUTION INTRAVENOUS at 13:53

## 2022-01-01 RX ADMIN — OXYCODONE HYDROCHLORIDE 10 MG: 5 SOLUTION ORAL at 16:11

## 2022-01-01 RX ADMIN — Medication 2 SPRAY: at 12:56

## 2022-01-01 RX ADMIN — OXYCODONE HYDROCHLORIDE 7.5 MG: 5 SOLUTION ORAL at 18:41

## 2022-01-01 RX ADMIN — DEXMEDETOMIDINE HYDROCHLORIDE 0.6 MCG/KG/HR: 400 INJECTION INTRAVENOUS at 06:45

## 2022-01-01 RX ADMIN — FUROSEMIDE 40 MG: 10 INJECTION, SOLUTION INTRAVENOUS at 06:25

## 2022-01-01 RX ADMIN — DEXMEDETOMIDINE HYDROCHLORIDE 0.7 MCG/KG/HR: 400 INJECTION INTRAVENOUS at 16:27

## 2022-01-01 RX ADMIN — PIPERACILLIN AND TAZOBACTAM 4.5 G: 4; .5 INJECTION, POWDER, FOR SOLUTION INTRAVENOUS at 14:16

## 2022-01-01 RX ADMIN — PIPERACILLIN AND TAZOBACTAM 4.5 G: 4; .5 INJECTION, POWDER, FOR SOLUTION INTRAVENOUS at 09:01

## 2022-01-01 RX ADMIN — PIPERACILLIN AND TAZOBACTAM 4.5 G: 4; .5 INJECTION, POWDER, FOR SOLUTION INTRAVENOUS at 07:38

## 2022-01-01 RX ADMIN — CHLORHEXIDINE GLUCONATE 15 ML: 1.2 SOLUTION ORAL at 19:54

## 2022-01-01 RX ADMIN — AMIODARONE HYDROCHLORIDE 400 MG: 200 TABLET ORAL at 08:43

## 2022-01-01 RX ADMIN — Medication 1 CAPSULE: at 09:01

## 2022-01-01 RX ADMIN — BACLOFEN 10 MG: 10 TABLET ORAL at 16:42

## 2022-01-01 RX ADMIN — INSULIN ASPART 1 UNITS: 100 INJECTION, SOLUTION INTRAVENOUS; SUBCUTANEOUS at 09:01

## 2022-01-01 RX ADMIN — PIPERACILLIN AND TAZOBACTAM 4.5 G: 4; .5 INJECTION, POWDER, FOR SOLUTION INTRAVENOUS at 20:54

## 2022-01-01 RX ADMIN — DEXMEDETOMIDINE HYDROCHLORIDE 0.6 MCG/KG/HR: 400 INJECTION INTRAVENOUS at 17:31

## 2022-01-01 RX ADMIN — OXYCODONE HYDROCHLORIDE 10 MG: 5 SOLUTION ORAL at 21:55

## 2022-01-01 RX ADMIN — FENTANYL CITRATE 200 MCG/HR: 50 INJECTION INTRAVENOUS at 19:28

## 2022-01-01 RX ADMIN — DEXMEDETOMIDINE HYDROCHLORIDE 0.7 MCG/KG/HR: 400 INJECTION INTRAVENOUS at 03:53

## 2022-01-01 RX ADMIN — ALBUTEROL SULFATE 2.5 MG: 2.5 SOLUTION RESPIRATORY (INHALATION) at 19:56

## 2022-01-01 RX ADMIN — PANTOPRAZOLE SODIUM 40 MG: 40 INJECTION, POWDER, FOR SOLUTION INTRAVENOUS at 07:39

## 2022-01-01 RX ADMIN — MULTIVIT AND MINERALS-FERROUS GLUCONATE 9 MG IRON/15 ML ORAL LIQUID 15 ML: at 08:44

## 2022-01-01 RX ADMIN — HEPARIN SODIUM 5000 UNITS: 5000 INJECTION INTRAVENOUS; SUBCUTANEOUS at 17:43

## 2022-01-01 RX ADMIN — HYDROCORTISONE SODIUM SUCCINATE 50 MG: 100 INJECTION, POWDER, FOR SOLUTION INTRAMUSCULAR; INTRAVENOUS at 04:26

## 2022-01-01 RX ADMIN — MICONAZOLE NITRATE: 2 POWDER TOPICAL at 21:00

## 2022-01-01 RX ADMIN — NOREPINEPHRINE BITARTRATE 4 MG/250 ML (16 MCG/ML) IN 0.9 % NACL IV 0.06 MCG/KG/MIN: at 05:43

## 2022-01-01 RX ADMIN — MICONAZOLE NITRATE: 2 POWDER TOPICAL at 21:35

## 2022-01-01 RX ADMIN — Medication 2 PACKET: at 15:58

## 2022-01-01 RX ADMIN — CHLORHEXIDINE GLUCONATE 15 ML: 1.2 SOLUTION ORAL at 20:53

## 2022-01-01 RX ADMIN — MIDAZOLAM HYDROCHLORIDE 8 MG/HR: 1 INJECTION, SOLUTION INTRAVENOUS at 09:50

## 2022-01-01 RX ADMIN — Medication 1 CAPSULE: at 20:57

## 2022-01-01 RX ADMIN — SIMETHICONE 80 MG: 80 TABLET, CHEWABLE ORAL at 11:55

## 2022-01-01 RX ADMIN — Medication 2 PACKET: at 16:30

## 2022-01-01 RX ADMIN — MICONAZOLE NITRATE: 2 POWDER TOPICAL at 22:06

## 2022-01-01 RX ADMIN — CHLORHEXIDINE GLUCONATE 15 ML: 1.2 SOLUTION ORAL at 08:47

## 2022-01-01 RX ADMIN — Medication 2 PACKET: at 16:15

## 2022-01-01 RX ADMIN — FENTANYL CITRATE 200 MCG/HR: 50 INJECTION INTRAVENOUS at 05:47

## 2022-01-01 RX ADMIN — ALBUTEROL SULFATE 2.5 MG: 2.5 SOLUTION RESPIRATORY (INHALATION) at 20:27

## 2022-01-01 RX ADMIN — ACETYLCYSTEINE 1 ML: 200 SOLUTION ORAL; RESPIRATORY (INHALATION) at 18:54

## 2022-01-01 RX ADMIN — PIPERACILLIN AND TAZOBACTAM 4.5 G: 4; .5 INJECTION, POWDER, FOR SOLUTION INTRAVENOUS at 13:56

## 2022-01-01 RX ADMIN — OXYCODONE HYDROCHLORIDE 10 MG: 5 SOLUTION ORAL at 20:51

## 2022-01-01 RX ADMIN — FENTANYL CITRATE 50 MCG: 50 INJECTION, SOLUTION INTRAMUSCULAR; INTRAVENOUS at 17:09

## 2022-01-01 ASSESSMENT — ACTIVITIES OF DAILY LIVING (ADL)
ADLS_ACUITY_SCORE: 28
ADLS_ACUITY_SCORE: 28
ADLS_ACUITY_SCORE: 29
ADLS_ACUITY_SCORE: 28
ADLS_ACUITY_SCORE: 27
ADLS_ACUITY_SCORE: 28
ADLS_ACUITY_SCORE: 28
ADLS_ACUITY_SCORE: 27
ADLS_ACUITY_SCORE: 28
ADLS_ACUITY_SCORE: 27
ADLS_ACUITY_SCORE: 28
ADLS_ACUITY_SCORE: 25
ADLS_ACUITY_SCORE: 28
ADLS_ACUITY_SCORE: 28
ADLS_ACUITY_SCORE: 25
ADLS_ACUITY_SCORE: 27
ADLS_ACUITY_SCORE: 25
ADLS_ACUITY_SCORE: 28
ADLS_ACUITY_SCORE: 28
ADLS_ACUITY_SCORE: 25
ADLS_ACUITY_SCORE: 28
ADLS_ACUITY_SCORE: 30
ADLS_ACUITY_SCORE: 27
ADLS_ACUITY_SCORE: 25
ADLS_ACUITY_SCORE: 28
ADLS_ACUITY_SCORE: 27
ADLS_ACUITY_SCORE: 30
ADLS_ACUITY_SCORE: 25
ADLS_ACUITY_SCORE: 27
ADLS_ACUITY_SCORE: 28
ADLS_ACUITY_SCORE: 26
ADLS_ACUITY_SCORE: 28
ADLS_ACUITY_SCORE: 28
ADLS_ACUITY_SCORE: 26
ADLS_ACUITY_SCORE: 28
ADLS_ACUITY_SCORE: 30
ADLS_ACUITY_SCORE: 28
ADLS_ACUITY_SCORE: 25
ADLS_ACUITY_SCORE: 28
ADLS_ACUITY_SCORE: 30
ADLS_ACUITY_SCORE: 28
ADLS_ACUITY_SCORE: 28
ADLS_ACUITY_SCORE: 25
ADLS_ACUITY_SCORE: 28
ADLS_ACUITY_SCORE: 27
ADLS_ACUITY_SCORE: 28
ADLS_ACUITY_SCORE: 24
ADLS_ACUITY_SCORE: 28
ADLS_ACUITY_SCORE: 29
ADLS_ACUITY_SCORE: 28
ADLS_ACUITY_SCORE: 26
ADLS_ACUITY_SCORE: 28
ADLS_ACUITY_SCORE: 28
ADLS_ACUITY_SCORE: 21
ADLS_ACUITY_SCORE: 28
ADLS_ACUITY_SCORE: 28
ADLS_ACUITY_SCORE: 26
ADLS_ACUITY_SCORE: 28
ADLS_ACUITY_SCORE: 23
ADLS_ACUITY_SCORE: 28
ADLS_ACUITY_SCORE: 30
ADLS_ACUITY_SCORE: 26
ADLS_ACUITY_SCORE: 25
ADLS_ACUITY_SCORE: 27
ADLS_ACUITY_SCORE: 28
ADLS_ACUITY_SCORE: 26
ADLS_ACUITY_SCORE: 27
ADLS_ACUITY_SCORE: 26
ADLS_ACUITY_SCORE: 28
ADLS_ACUITY_SCORE: 28
ADLS_ACUITY_SCORE: 26
ADLS_ACUITY_SCORE: 28
ADLS_ACUITY_SCORE: 30
ADLS_ACUITY_SCORE: 27
ADLS_ACUITY_SCORE: 25
ADLS_ACUITY_SCORE: 23
ADLS_ACUITY_SCORE: 28
ADLS_ACUITY_SCORE: 26
ADLS_ACUITY_SCORE: 27
ADLS_ACUITY_SCORE: 30
ADLS_ACUITY_SCORE: 27
ADLS_ACUITY_SCORE: 29
ADLS_ACUITY_SCORE: 28
ADLS_ACUITY_SCORE: 29
ADLS_ACUITY_SCORE: 26
ADLS_ACUITY_SCORE: 26
ADLS_ACUITY_SCORE: 27
ADLS_ACUITY_SCORE: 25
ADLS_ACUITY_SCORE: 26
ADLS_ACUITY_SCORE: 28
ADLS_ACUITY_SCORE: 26
ADLS_ACUITY_SCORE: 28
ADLS_ACUITY_SCORE: 29
ADLS_ACUITY_SCORE: 25
ADLS_ACUITY_SCORE: 28
ADLS_ACUITY_SCORE: 25
ADLS_ACUITY_SCORE: 25
ADLS_ACUITY_SCORE: 26
ADLS_ACUITY_SCORE: 27
ADLS_ACUITY_SCORE: 28
ADLS_ACUITY_SCORE: 28
ADLS_ACUITY_SCORE: 30
ADLS_ACUITY_SCORE: 28
ADLS_ACUITY_SCORE: 26
ADLS_ACUITY_SCORE: 26
ADLS_ACUITY_SCORE: 30
ADLS_ACUITY_SCORE: 28
ADLS_ACUITY_SCORE: 25
ADLS_ACUITY_SCORE: 28
ADLS_ACUITY_SCORE: 26
ADLS_ACUITY_SCORE: 25
ADLS_ACUITY_SCORE: 26
ADLS_ACUITY_SCORE: 28
ADLS_ACUITY_SCORE: 28
ADLS_ACUITY_SCORE: 27
ADLS_ACUITY_SCORE: 26
ADLS_ACUITY_SCORE: 23
ADLS_ACUITY_SCORE: 28
ADLS_ACUITY_SCORE: 27
ADLS_ACUITY_SCORE: 25
ADLS_ACUITY_SCORE: 28
ADLS_ACUITY_SCORE: 27
ADLS_ACUITY_SCORE: 28
ADLS_ACUITY_SCORE: 28
ADLS_ACUITY_SCORE: 25
ADLS_ACUITY_SCORE: 28
ADLS_ACUITY_SCORE: 28
ADLS_ACUITY_SCORE: 29
ADLS_ACUITY_SCORE: 28
ADLS_ACUITY_SCORE: 25
ADLS_ACUITY_SCORE: 30
ADLS_ACUITY_SCORE: 26
ADLS_ACUITY_SCORE: 27
ADLS_ACUITY_SCORE: 28
ADLS_ACUITY_SCORE: 29
ADLS_ACUITY_SCORE: 26
ADLS_ACUITY_SCORE: 21
ADLS_ACUITY_SCORE: 29
ADLS_ACUITY_SCORE: 29
ADLS_ACUITY_SCORE: 30
ADLS_ACUITY_SCORE: 28
ADLS_ACUITY_SCORE: 26
ADLS_ACUITY_SCORE: 28
ADLS_ACUITY_SCORE: 21
ADLS_ACUITY_SCORE: 25
ADLS_ACUITY_SCORE: 26
ADLS_ACUITY_SCORE: 28
ADLS_ACUITY_SCORE: 30
ADLS_ACUITY_SCORE: 28
ADLS_ACUITY_SCORE: 28
ADLS_ACUITY_SCORE: 26
ADLS_ACUITY_SCORE: 28
ADLS_ACUITY_SCORE: 28
ADLS_ACUITY_SCORE: 30
ADLS_ACUITY_SCORE: 28
ADLS_ACUITY_SCORE: 28
ADLS_ACUITY_SCORE: 30
ADLS_ACUITY_SCORE: 28
ADLS_ACUITY_SCORE: 25
ADLS_ACUITY_SCORE: 29
ADLS_ACUITY_SCORE: 27
ADLS_ACUITY_SCORE: 28
ADLS_ACUITY_SCORE: 25
ADLS_ACUITY_SCORE: 26
ADLS_ACUITY_SCORE: 28
ADLS_ACUITY_SCORE: 26
ADLS_ACUITY_SCORE: 28
ADLS_ACUITY_SCORE: 27
ADLS_ACUITY_SCORE: 28
ADLS_ACUITY_SCORE: 25
ADLS_ACUITY_SCORE: 28
ADLS_ACUITY_SCORE: 23
ADLS_ACUITY_SCORE: 27
ADLS_ACUITY_SCORE: 28
ADLS_ACUITY_SCORE: 27
ADLS_ACUITY_SCORE: 30
ADLS_ACUITY_SCORE: 26
ADLS_ACUITY_SCORE: 28
ADLS_ACUITY_SCORE: 25
ADLS_ACUITY_SCORE: 26
ADLS_ACUITY_SCORE: 28
ADLS_ACUITY_SCORE: 29
ADLS_ACUITY_SCORE: 25
ADLS_ACUITY_SCORE: 26
ADLS_ACUITY_SCORE: 28
ADLS_ACUITY_SCORE: 30
ADLS_ACUITY_SCORE: 28
ADLS_ACUITY_SCORE: 28
ADLS_ACUITY_SCORE: 30
ADLS_ACUITY_SCORE: 28
ADLS_ACUITY_SCORE: 30
ADLS_ACUITY_SCORE: 28
ADLS_ACUITY_SCORE: 26
ADLS_ACUITY_SCORE: 28
ADLS_ACUITY_SCORE: 28
ADLS_ACUITY_SCORE: 26
ADLS_ACUITY_SCORE: 27
ADLS_ACUITY_SCORE: 28
PREVIOUS_RESPONSIBILITIES: MEAL PREP;MEDICATION MANAGEMENT
ADLS_ACUITY_SCORE: 30
ADLS_ACUITY_SCORE: 28
ADLS_ACUITY_SCORE: 26
ADLS_ACUITY_SCORE: 28
ADLS_ACUITY_SCORE: 28
ADLS_ACUITY_SCORE: 25
ADLS_ACUITY_SCORE: 28
ADLS_ACUITY_SCORE: 27
ADLS_ACUITY_SCORE: 28
ADLS_ACUITY_SCORE: 25
ADLS_ACUITY_SCORE: 28
ADLS_ACUITY_SCORE: 25
ADLS_ACUITY_SCORE: 28
ADLS_ACUITY_SCORE: 26
ADLS_ACUITY_SCORE: 28
ADLS_ACUITY_SCORE: 30
ADLS_ACUITY_SCORE: 28
ADLS_ACUITY_SCORE: 28
ADLS_ACUITY_SCORE: 27
ADLS_ACUITY_SCORE: 26
ADLS_ACUITY_SCORE: 28
ADLS_ACUITY_SCORE: 25
ADLS_ACUITY_SCORE: 27
ADLS_ACUITY_SCORE: 25
ADLS_ACUITY_SCORE: 28
ADLS_ACUITY_SCORE: 29
ADLS_ACUITY_SCORE: 28
ADLS_ACUITY_SCORE: 21
ADLS_ACUITY_SCORE: 28
ADLS_ACUITY_SCORE: 26
ADLS_ACUITY_SCORE: 28
ADLS_ACUITY_SCORE: 25
ADLS_ACUITY_SCORE: 28
ADLS_ACUITY_SCORE: 28
ADLS_ACUITY_SCORE: 26
ADLS_ACUITY_SCORE: 28
ADLS_ACUITY_SCORE: 26
ADLS_ACUITY_SCORE: 28
ADLS_ACUITY_SCORE: 30
ADLS_ACUITY_SCORE: 26
ADLS_ACUITY_SCORE: 30
ADLS_ACUITY_SCORE: 26
ADLS_ACUITY_SCORE: 28
ADLS_ACUITY_SCORE: 25
ADLS_ACUITY_SCORE: 30
ADLS_ACUITY_SCORE: 29
ADLS_ACUITY_SCORE: 28
ADLS_ACUITY_SCORE: 25
ADLS_ACUITY_SCORE: 26
ADLS_ACUITY_SCORE: 28
ADLS_ACUITY_SCORE: 26
ADLS_ACUITY_SCORE: 26
ADLS_ACUITY_SCORE: 28
ADLS_ACUITY_SCORE: 27
ADLS_ACUITY_SCORE: 29
ADLS_ACUITY_SCORE: 28
ADLS_ACUITY_SCORE: 27
ADLS_ACUITY_SCORE: 28
ADLS_ACUITY_SCORE: 28
ADLS_ACUITY_SCORE: 25
ADLS_ACUITY_SCORE: 28
ADLS_ACUITY_SCORE: 26
ADLS_ACUITY_SCORE: 28
ADLS_ACUITY_SCORE: 27
ADLS_ACUITY_SCORE: 21
ADLS_ACUITY_SCORE: 29
ADLS_ACUITY_SCORE: 28
ADLS_ACUITY_SCORE: 30
ADLS_ACUITY_SCORE: 28
ADLS_ACUITY_SCORE: 23
ADLS_ACUITY_SCORE: 28
ADLS_ACUITY_SCORE: 26
ADLS_ACUITY_SCORE: 21
ADLS_ACUITY_SCORE: 28
ADLS_ACUITY_SCORE: 25
ADLS_ACUITY_SCORE: 28
ADLS_ACUITY_SCORE: 27
ADLS_ACUITY_SCORE: 25
ADLS_ACUITY_SCORE: 27
ADLS_ACUITY_SCORE: 26
ADLS_ACUITY_SCORE: 28
ADLS_ACUITY_SCORE: 25
ADLS_ACUITY_SCORE: 28
ADLS_ACUITY_SCORE: 26
ADLS_ACUITY_SCORE: 28
ADLS_ACUITY_SCORE: 29
ADLS_ACUITY_SCORE: 26
ADLS_ACUITY_SCORE: 28
ADLS_ACUITY_SCORE: 21
ADLS_ACUITY_SCORE: 29
ADLS_ACUITY_SCORE: 26
ADLS_ACUITY_SCORE: 26
ADLS_ACUITY_SCORE: 28
ADLS_ACUITY_SCORE: 30
ADLS_ACUITY_SCORE: 28
ADLS_ACUITY_SCORE: 28
ADLS_ACUITY_SCORE: 23
ADLS_ACUITY_SCORE: 26
ADLS_ACUITY_SCORE: 28
ADLS_ACUITY_SCORE: 29
ADLS_ACUITY_SCORE: 27
ADLS_ACUITY_SCORE: 28
ADLS_ACUITY_SCORE: 26
ADLS_ACUITY_SCORE: 28
ADLS_ACUITY_SCORE: 28
ADLS_ACUITY_SCORE: 25
ADLS_ACUITY_SCORE: 28
ADLS_ACUITY_SCORE: 30
ADLS_ACUITY_SCORE: 28
ADLS_ACUITY_SCORE: 25
ADLS_ACUITY_SCORE: 30
ADLS_ACUITY_SCORE: 28
ADLS_ACUITY_SCORE: 30
ADLS_ACUITY_SCORE: 28
ADLS_ACUITY_SCORE: 24
ADLS_ACUITY_SCORE: 27
ADLS_ACUITY_SCORE: 28
ADLS_ACUITY_SCORE: 23
ADLS_ACUITY_SCORE: 28
ADLS_ACUITY_SCORE: 28
ADLS_ACUITY_SCORE: 23
ADLS_ACUITY_SCORE: 30
ADLS_ACUITY_SCORE: 28
ADLS_ACUITY_SCORE: 26
ADLS_ACUITY_SCORE: 25
ADLS_ACUITY_SCORE: 27
ADLS_ACUITY_SCORE: 28
ADLS_ACUITY_SCORE: 30
ADLS_ACUITY_SCORE: 28
ADLS_ACUITY_SCORE: 23
ADLS_ACUITY_SCORE: 26
ADLS_ACUITY_SCORE: 28
ADLS_ACUITY_SCORE: 25
ADLS_ACUITY_SCORE: 28
ADLS_ACUITY_SCORE: 28
ADLS_ACUITY_SCORE: 30
ADLS_ACUITY_SCORE: 27
ADLS_ACUITY_SCORE: 27
ADLS_ACUITY_SCORE: 28
ADLS_ACUITY_SCORE: 28
ADLS_ACUITY_SCORE: 27
ADLS_ACUITY_SCORE: 28
ADLS_ACUITY_SCORE: 27
ADLS_ACUITY_SCORE: 28
ADLS_ACUITY_SCORE: 21
ADLS_ACUITY_SCORE: 27
ADLS_ACUITY_SCORE: 25
ADLS_ACUITY_SCORE: 30
ADLS_ACUITY_SCORE: 26
ADLS_ACUITY_SCORE: 27
ADLS_ACUITY_SCORE: 26
ADLS_ACUITY_SCORE: 28
ADLS_ACUITY_SCORE: 26
ADLS_ACUITY_SCORE: 26
ADLS_ACUITY_SCORE: 28
ADLS_ACUITY_SCORE: 30
ADLS_ACUITY_SCORE: 28
ADLS_ACUITY_SCORE: 30
ADLS_ACUITY_SCORE: 28
ADLS_ACUITY_SCORE: 26
ADLS_ACUITY_SCORE: 28
ADLS_ACUITY_SCORE: 28
ADLS_ACUITY_SCORE: 25
ADLS_ACUITY_SCORE: 28
ADLS_ACUITY_SCORE: 30
ADLS_ACUITY_SCORE: 25
ADLS_ACUITY_SCORE: 28
ADLS_ACUITY_SCORE: 25
ADLS_ACUITY_SCORE: 28
ADLS_ACUITY_SCORE: 28
ADLS_ACUITY_SCORE: 30
ADLS_ACUITY_SCORE: 25
ADLS_ACUITY_SCORE: 28
ADLS_ACUITY_SCORE: 28
ADLS_ACUITY_SCORE: 30
ADLS_ACUITY_SCORE: 28
ADLS_ACUITY_SCORE: 30
ADLS_ACUITY_SCORE: 27
ADLS_ACUITY_SCORE: 28
ADLS_ACUITY_SCORE: 28
ADLS_ACUITY_SCORE: 30
ADLS_ACUITY_SCORE: 28
ADLS_ACUITY_SCORE: 21
ADLS_ACUITY_SCORE: 27
ADLS_ACUITY_SCORE: 28
ADLS_ACUITY_SCORE: 29
ADLS_ACUITY_SCORE: 28
ADLS_ACUITY_SCORE: 29
ADLS_ACUITY_SCORE: 28
ADLS_ACUITY_SCORE: 29
ADLS_ACUITY_SCORE: 27
ADLS_ACUITY_SCORE: 26
ADLS_ACUITY_SCORE: 28
ADLS_ACUITY_SCORE: 21
ADLS_ACUITY_SCORE: 28
ADLS_ACUITY_SCORE: 25
ADLS_ACUITY_SCORE: 28

## 2022-01-01 ASSESSMENT — MIFFLIN-ST. JEOR
SCORE: 1956.63
SCORE: 1950.63
SCORE: 1915.63
SCORE: 1951.63
SCORE: 1880.63
SCORE: 1774.63
SCORE: 1895.63
SCORE: 1854.63
SCORE: 1944.63
SCORE: 1942.63
SCORE: 1887.63
SCORE: 1927.63
SCORE: 1946.63
SCORE: 1928.63
SCORE: 1882.95
SCORE: 1784.63
SCORE: 1760.63

## 2022-01-01 NOTE — PLAN OF CARE
Pt transferred from ICU and settled ~1515    A&O x4. VS: On 8L oxymask, BP slightly elevated. Tele SR with BBB. Pt bed-bound.   LS course. BS hypo. Bobo catheter with adequate output.   Several pre-existing wounds: underneath breasts bilaterally, panus/groin, coccyx area: RLE skin hard and bumpy, denies pain or itchiness  Pulses palpable  Tube feed via naso tube at 65cc/hr with scheduled flushes. Tolerating full liquids

## 2022-01-01 NOTE — CONSULTS
[facility]  Cardiology Consultation     Date of Admission:  12/23/2021    Assessment & Plan   Amanda Richardson is a 56 year old female with    1.  Septic shock secondary to UTI  2.  Atrial fibrillation-on amiodarone, now in sinus rhythm  3.  One episode of atrial flutter noted on EKG in 1/2019  4.  Hypertension  5.  Multiple sclerosis  6.  Chronic pain syndrome  7.  Type 2 diabetes  8.  Obesity    Recommendation:   1.  Continue amiodarone for now.  Switch to 200 mg p.o. daily at discharge.  She should get a 14-day Zio patch monitor put on at the time of discharge.  She should see cardiology a month after discharge to follow-up with the results of the Zio patch.  If negative for A. fib recurrence, will discontinue amiodarone.  Will hold off on initiation of anticoagulation as this was the first episode of A. fib due to other acute medical issues.  However, her [id number] score is 3 [hypertension, gender, diabetes] and hence if A. fib recurs she would require long-term anticoagulation.    Will sign off for now. Call with questions.     Vitaliy Rigo    Code Status    No CPR- Pre-arrest intubation OK    Reason for Consult   Reason for consult: Atrial fibrillation    Primary Care Physician   Julius Hassan    Chief Complaint   Admitted for altered mental status and septic shock.    History of Present Illness   Amanda Richardson is a 56 year old female with past medical history of MS, chronic pain syndrome, type 2 diabetes, hypertension, hyperlipidemia, CKD, non-Hodgkin's lymphoma and obesity who presented to the emergency department with altered mental status and was noted to be in septic shock secondary to UTI.  She initially required pressor support but was later weaned off.  Also, noted to have an episode of atrial fibrillation for which she was started on amiodarone drip followed by p.o.  She has no history of atrial fibrillation in the past but an EKG from 2019 shows Atrial flutter.   Transthoracic echocardiogram showed normal biventricular size and systolic function, ejection fraction of 60 to 65%, no significant wall motion abnormalities and normal-sized left atria.    Patient Active Problem List   Diagnosis     MS (multiple sclerosis) (H)     Non Hodgkin's lymphoma (H)     Leukocytosis     Gallstone     Hypertension goal BP (blood pressure) < 140/90     Spinal stenosis of lumbar region, unspecified whether neurogenic claudication present     Abnormality of gait     Pain medication agreement- signed Nov 20,2012     Lumbar radiculopathy     Colon polyps     Neuralgia, neuritis, and radiculitis, unspecified     Encounter for long-term current use of medication     Health Care Home     Moderate depressive episode (H)     Leg muscle spasm     Chronic pain syndrome     Controlled substance agreement signed     T-cell lymphoma (H)     Type 2 diabetes, HbA1c goal < 7% (H)     Hyperlipidemia LDL goal <70     CKD (chronic kidney disease) stage 1, GFR 90 ml/min or greater     Type 2 diabetes mellitus with stage 1 chronic kidney disease, without long-term current use of insulin (H)     Morbid obesity (H)     Acute hypercapnic respiratory failure (H)     Sepsis (H)     S/P right hip fracture     Lymphedema     Other proteinuria     Hyperkalemia     Anuria     Encephalopathy     Muscle spasm     Shock liver     NSTEMI (non-ST elevated myocardial infarction) (H)     Emphysematous pyelitis     Decubitus ulcer of left leg     Septic shock (H)     Acute renal failure, unspecified acute renal failure type (H)     Acute hypoxemic respiratory failure (H)       Past Medical History   I have reviewed this patient's medical history and updated it with pertinent information if needed.   Past Medical History:   Diagnosis Date     Abnormality of gait 07/27/2012     Arrhythmia     with sepsis     Chronic pain     [facility] - yearly, next in summer 2018     CKD (chronic kidney disease) stage 1, GFR 90 ml/min or  greater     kidney stones     Colon polyps 01/2015    tubular adenomas x 2     Depressive disorder      Gallstone 06/11/2012     Hyperlipidemia LDL goal <70      Hypertension goal BP (blood pressure) < 140/90      Leukocytosis 06/11/2012     Moderate depressive episode (H)      MS (multiple sclerosis) (H) 2003    Dr Vigil/Gaby - [facility]     Multiple sclerosis (H)      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  Past Surgical History:   Procedure Laterality Date     COLONOSCOPY N/A 1/7/2015    tubular adenomas x 2 - due 5 yrs     COMBINED CYSTOSCOPY, RETROGRADES, URETEROSCOPY, INSERT STENT Left 1/30/2019    Procedure: 1. Cystoscopy 2. LEFT retrograde pyelogram 3. LEFT JJ stent placement 4. <1hr physician fluoroscopy time;  Surgeon: Epifanio Sapp MD;  Location: [facility] OR     COMBINED CYSTOSCOPY, RETROGRADES, URETEROSCOPY, LASER HOLMIUM LITHOTRIPSY URETER(S), INSERT STENT Left 3/15/2019    Procedure: Cystoscopy, left ureteral stent exchange, left retrograde pyelogram, interpretation  of fluoroscopic images, left ureteroscopy with holmium lithotripsy and stone basketing, 22 modifier for difficult lengthy case.;  Surgeon: Mayito Chauhan MD;  Location: [facility] OR     CYSTOSCOPY       CYSTOSCOPY, REMOVE STENT(S), COMBINED Left 3/27/2019    Procedure: Flexible cystoscopy with left ureteral stent removal;  Surgeon: Mayito Chauhan MD;  Location: [facility] OR     FUSION LUMBAR ANTERIOR, FUSION LUMBAR POSTERIOR TWO LEVELS, COMBINED  10/17/2013    lumbar fusion - Dr Floyd     INSERT PUMP BACLOFEN  2017    intrathecal baclofen pump implantation     IRRIGATION AND DEBRIDEMENT LOWER EXTREMITY, COMBINED Right 2015    Right ankle I&D d/t infection     OPEN REDUCTION INTERNAL FIXATION ANKLE  5/15    Right Bimalleolar ankle fx ORIF     OPEN REDUCTION INTERNAL FIXATION ANKLE Right 2015    Revision due to spasms pulling screws out of ankle     SINUS SURGERY      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     XR LUMBAR EPIDURAL INJECTION INCL IMAGING  3/14    Left L4-5 Epidural Dr Winter       Prior to Admission Medications   Prior to Admission Medications   Prescriptions Last Dose Informant Patient Reported? Taking?   B Complex Vitamins (B COMPLEX PO) Unknown at Unknown time Spouse/Significant Other Yes Yes   Sig: Take 1 tablet by mouth daily   CHLORPHENIRAMINE-ACETAMINOPHEN PO Unknown at Unknown time Spouse/Significant Other Yes Yes   Sig: Take 2 tablets by mouth Tablet contains acetaminophen 500 mg, dextromethorphan 15 mg, chlorpheniramine 2 mg   Chlorpheniramine-DM (COUGH & COLD HBP) 4-30 MG TABS Unknown at Unknown time Spouse/Significant Other Yes Yes   Sig: Take 1 tablet by mouth every 6 hours as needed   DULoxetine (CYMBALTA) 60 MG capsule Unknown at Unknown time Spouse/Significant Other No Yes   Sig: Take 1 capsule (60 mg) by mouth 2 times daily   Hesperidin-Diosmin 250-650 MG TABS Unknown at Unknown time Spouse/Significant Other No Yes   Si tabs daily per wound care    Multiple Vitamin (MULTI-VITAMIN PO) Unknown at Unknown time Spouse/Significant Other Yes Yes   Sig: Take 1 tablet by mouth daily    Vitamin D3 (CHOLECALCIFEROL) 125 MCG (5000 UT) tablet Unknown at Unknown time Spouse/Significant Other Yes Yes   Sig: Take 2 tablets by mouth every morning   acetaminophen (TYLENOL) 325 MG tablet Unknown at Unknown time Spouse/Significant Other No Yes   Sig: Take 2 tablets (650 mg) by mouth every 4 hours as needed for mild pain   amitriptyline (ELAVIL) 100 MG tablet Not Taking at Unknown time Spouse/Significant Other No No   Sig: Take 1 tablet (100 mg) by mouth At Bedtime   Patient not taking: Reported on 12/24/2021   aspirin (ASA) 81 MG chewable tablet Unknown at Unknown time Spouse/Significant Other Yes Yes   Sig: Take 162 mg by mouth every morning    atorvastatin (LIPITOR) 10 MG tablet Unknown at Unknown time Spouse/Significant Other No Yes   Sig: Take 1 tablet (10 mg) by mouth daily   baclofen (LIORESAL) 10 MG tablet Unknown at Unknown time Spouse/Significant Other Yes Yes   Sig: 10 mg by Per G Tube route 3 times daily as needed for muscle spasms In addition to pump   baclofen (LIORESAL) intraTHECAL Internal Pump 12/24/2021 at Unknown time Spouse/Significant Other Yes Yes   Sig: by Intrathecal route continuous prn (467.6 mcg/24h simple continuous infusion) Dr. [facility] manages, pump refill due 5/2022. Settings last updated 12/13/2021.   blood glucose (ACCU-CHEK KEL) test strip   No No   Sig: Use to test blood sugar 1 times daily or as directed.   blood glucose (NO BRAND SPECIFIED) lancets standard   No No   Sig: Use to test blood sugar 1 times daily or as directed.   blood glucose calibration (NO BRAND SPECIFIED) solution   No No   Sig: Use to calibrate blood glucose monitor as needed as directed.   blood glucose monitoring (ACCU-CHEK KEL PLUS) meter device kit   No No   Sig: Use to test blood sugar 1 times daily or as directed.   blood glucose monitoring  (ACCU-CHEK MULTICLIX) lancets   No No   Sig: Use to test blood sugar 1 times daily or as directed.   doxycycline hyclate (VIBRA-TABS) 100 MG tablet 2021 at 15 tablets remain in Rx bottle Spouse/Significant Other No Yes   Sig: Take 1 tablet (100 mg) by mouth 2 times daily for 10 days   ibuprofen (ADVIL/MOTRIN) 200 MG tablet Unknown at Unknown time Spouse/Significant Other Yes Yes   Sig: Take 200 mg by mouth every 4 hours as needed for mild pain   losartan-hydrochlorothiazide (HYZAAR) 50-12.5 MG tablet Unknown at Unknown time Spouse/Significant Other No Yes   Sig: Take 1 tablet by mouth daily   metFORMIN (GLUCOPHAGE) 500 MG tablet Unknown at Unknown time Spouse/Significant Other No Yes   Si Tabs QAM and 2 Tabs QPM   metoprolol succinate ER (TOPROL-XL) 50 MG 24 hr tablet Unknown at Unknown time Spouse/Significant Other No Yes   Sig: Take 1 tablet (50 mg) by mouth daily   naloxone (NARCAN) 4 MG/0.1ML nasal spray Unknown at spouse confirmed pt has home supply Spouse/Significant Other No Yes   Sig: Spray 1 spray (4 mg) into one nostril alternating nostrils once as needed for opioid reversal Every 2-3 minutes until patient responsive or EMS arrives   omega-3 fatty acids (FISH OIL) 1200 MG capsule Unknown at Unknown time Spouse/Significant Other Yes Yes   Sig: Take 1 capsule by mouth daily.   oxyCODONE IR (ROXICODONE) 15 MG tablet Unknown at Unknown time Spouse/Significant Other No Yes   Sig: Take 1 tablet (15 mg) by mouth every 6 hours as needed for moderate to severe pain Limit 4 tablet(s) per day.   senna-docusate (SENOKOT-S/PERICOLACE) 8.6-50 MG tablet Unknown at Unknown time Spouse/Significant Other No Yes   Sig: Take 2 tablets by mouth daily as needed for constipation      Facility-Administered Medications: None     Current Facility-Administered Medications   Medication Dose Route Frequency     amiodarone  400 mg Oral or Feeding Tube BID     [START ON 1/3/2022] amiodarone  400 mg Oral or Feeding Tube Daily      docusate  100 mg Oral or Feeding Tube BID     DULoxetine  60 mg Oral or Feeding Tube BID     heparin ANTICOAGULANT  5,000 Units Subcutaneous Q8H     insulin aspart  1-6 Units Subcutaneous Q4H     multivitamins w/minerals  15 mL Per Feeding Tube Daily     oxyCODONE  10 mg Oral or Feeding Tube Q4H     piperacillin-tazobactam  4.5 g Intravenous Q6H     polyethylene glycol  17 g Oral or Feeding Tube Daily     sennosides  5 mL Oral or Feeding Tube BID     Current Facility-Administered Medications   Medication Last Rate     baclofen (LIORESAL) intraTHECAL Internal Pump       dextrose       Allergies   Allergies   Allergen Reactions     Lisinopril Angioedema     Lip swelling     Flexeril [Cyclobenzaprine Hcl] Other (See Comments)     confusion       Social History    reports that she quit smoking about 8 years ago. Her smoking use included cigarettes. She has a 15.50 pack-year smoking history. She has never used smokeless tobacco. She reports that she does not drink alcohol and does not use drugs.    Family History   Family History   Problem Relation Age of Onset     C.A.D. Father         with CHF      Cancer Father         ? unsure type - abdominal      Hypertension Mother      Thyroid Disease Mother         goiter      Breast Cancer Sister 58     Thyroid Disease Son      Cancer Paternal Grandfather      Cancer - colorectal No family hx of        Review of Systems   The comprehensive 10 point Review of Systems is negative other than noted in the HPI or here.     Physical Exam   Temp: 98.2  F (36.8  C) Temp src: Axillary BP: (!) 149/62 Pulse: 80   Resp: 23 SpO2: 91 % O2 Device: Nasal cannula Oxygen Delivery: 6 LPM  Vital Signs with Ranges  Temp:  [97.6  F (36.4  C)-98.2  F (36.8  C)] 98.2  F (36.8  C)  Pulse:  [] 80  Resp:  [8-39] 23  BP: (133-161)/() 149/62  FiO2 (%):  [40 %] 40 %  SpO2:  [87 %-98 %] 91 %  274 lbs 0 oz    Constitutional: Alert, no apparent distress,    Lungs: Normal bilateral breath sounds    Cardiovascular: Regular rate and rhythm, normal S1 and S2, and no murmur   Lymphm node  Neck No enlargement  No jugular vein extension or carotid bruit   Skin: Normal   Extremity: No edema       Data   Results for orders placed or performed during the hospital encounter of 12/23/21 (from the past 24 hour(s))   Glucose by meter   Result Value Ref Range    GLUCOSE BY METER POCT 110 (H) 70 - 99 mg/dL   Potassium   Result Value Ref Range    Potassium 3.4 3.4 - 5.3 mmol/L   Glucose by meter   Result Value Ref Range    GLUCOSE BY METER POCT 141 (H) 70 - 99 mg/dL   Glucose by meter   Result Value Ref Range    GLUCOSE BY METER POCT 134 (H) 70 - 99 mg/dL   Glucose by meter   Result Value Ref Range    GLUCOSE BY METER POCT 137 (H) 70 - 99 mg/dL   Comprehensive metabolic panel   Result Value Ref Range    Sodium 149 (H) 133 - 144 mmol/L    Potassium 3.5 3.4 - 5.3 mmol/L    Chloride 117 (H) 94 - 109 mmol/L    Carbon Dioxide (CO2) 29 20 - 32 mmol/L    Anion Gap 3 3 - 14 mmol/L    Urea Nitrogen 20 7 - 30 mg/dL    Creatinine 0.74 0.52 - 1.04 mg/dL    Calcium 7.9 (L) 8.5 - 10.1 mg/dL    Glucose 150 (H) 70 - 99 mg/dL    Alkaline Phosphatase 154 (H) 40 - 150 U/L    AST 42 0 - 45 U/L     (H) 0 - 50 U/L    Protein Total 7.8 6.8 - 8.8 g/dL    Albumin 2.0 (L) 3.4 - 5.0 g/dL    Bilirubin Total 0.9 0.2 - 1.3 mg/dL    GFR Estimate >90 >60 mL/min/1.73m2   CBC with platelets   Result Value Ref Range    WBC Count 16.2 (H) 4.0 - 11.0 10e3/uL    RBC Count 5.51 (H) 3.80 - 5.20 10e6/uL    Hemoglobin 11.8 11.7 - 15.7 g/dL    Hematocrit 44.5 35.0 - 47.0 %    MCV 81 78 - 100 fL    MCH 21.4 (L) 26.5 - 33.0 pg    MCHC 26.5 (L) 31.5 - 36.5 g/dL    RDW 22.2 (H) 10.0 - 15.0 %    Platelet Count 255 150 - 450 10e3/uL   Glucose by meter   Result Value Ref Range    GLUCOSE BY METER POCT 131 (H) 70 - 99 mg/dL   Echocardiogram Complete   Result Value Ref Range    LVEF  60-65%     Narrative    [id number]  [id number]  [id number]  [id number]^MARISSA^JOSIAH Nam  [facility]  Echocardiography Laboratory  [address] [place] [address]     Name: JIMMY SYLVESTER  MRN: [id number]  : 1965  Study Date: 2022 11:15 AM  Age: 56 yrs  Gender: Female  Patient Location: [facility]  Reason For Study: Atrial Fibrillation  Ordering Physician: JOSIAH ANN  Referring Physician: Julius Hassan  Performed By: Izabela Baldwin     BSA: 2.4 m2  Height: 69 in  Weight: 274 lb  HR: 79  BP: 161/72 mmHg  ______________________________________________________________________________  Procedure  Complete Portable Echo Adult. Optison (NDC #5571-3030) given intravenously.  ______________________________________________________________________________  Interpretation Summary     1. Normal left ventricular size and function. Left ventricular ejection  fraction of 60-65%. No segmental wall motion abnormalities noted.  2. The right ventricle is mildly dilated. The right ventricular systolic  function is normal.  3. Valves not well assessed on this technically difficult study.  Compared to the previous echocardiogram on 2019, there are no significant  changes. Technically very difficult study.  ______________________________________________________________________________  Left Ventricle  The left ventricle is normal in size. There is concentric remodeling present.  Left ventricular systolic function is normal. The visual ejection fraction is  60-65%. Grade I or early diastolic dysfunction. No regional wall motion  abnormalities noted.     Right Ventricle  The right ventricle is mildly dilated. The right ventricular systolic function  is normal.     Atria  Normal left atrial size. The right atrium is mildly dilated.     Mitral Valve  There is trace mitral regurgitation. There is no mitral valve stenosis.     Tricuspid Valve  The tricuspid valve is not well visualized. Right ventricular systolic  pressure could not be approximated due to inadequate tricuspid regurgitation.      Aortic Valve  The aortic valve is not well visualized. The mean AoV pressure gradient is  12.9 mmHg.     Pulmonic Valve  The pulmonic valve is not well visualized.     Vessels  Normal size aorta. Normal size ascending aorta. IVC diameter and respiratory  changes fall into an intermediate range suggesting an RA pressure of 8 mmHg.     Pericardium  There is no pericardial effusion.     Rhythm  Sinus rhythm was noted.  ______________________________________________________________________________  MMode/2D Measurements & Calculations  IVSd: 1.2 cm     LVIDd: 5.5 cm  LVIDs: 3.0 cm  LVPWd: 1.3 cm  FS: 44.2 %  LV mass(C)d: 283.0 grams  LV mass(C)dI: 119.9 grams/m2  Ao root diam: 3.2 cm  LA dimension: 3.9 cm  asc Aorta Diam: 3.2 cm  LA/Ao: 1.2  LVOT diam: 2.3 cm  LVOT area: 4.2 cm2  LA Volume (BP): 77.3 ml  LA Volume Index (BP): 32.8 ml/m2  RWT: 0.46     Doppler Measurements & Calculations  MV E max oscar: 119.0 cm/sec  MV A max oscar: 112.5 cm/sec  MV E/A: 1.1  MV max P.9 mmHg  MV mean PG: 3.6 mmHg  MV V2 VTI: 40.3 cm  MVA(VTI): 3.7 cm2  MV dec slope: 497.6 cm/sec2  Ao V2 max: 232.4 cm/sec  Ao max P.0 mmHg  Ao V2 mean: 174.4 cm/sec  Ao mean P.9 mmHg  Ao V2 VTI: 52.8 cm  CHRISTA(I,D): 2.8 cm2  CHRISTA(V,D): 2.7 cm2  LV V1 max P.2 mmHg  LV V1 max: 151.3 cm/sec  LV V1 VTI: 36.0 cm  SV(LVOT): 149.7 ml  SI(LVOT): 63.4 ml/m2  PA acc time: 0.08 sec  AV Oscar Ratio (DI): 0.65  CHRISTA Index (cm2/m2): 1.2  E/E' av.6  Lateral E/e': 10.6  Medial E/e': 14.5     ______________________________________________________________________________  Report approved by: Zane Armijo 2022 02:22 PM         Glucose by meter   Result Value Ref Range    GLUCOSE BY METER POCT 156 (H) 70 - 99 mg/dL

## 2022-01-01 NOTE — PROGRESS NOTES
[facility]    Medicine Progress Note - Hospitalist Service       Date of Admission:  12/23/2021    Assessment & Plan      Amanda Richardson is a 56 year old female with a past medical history of MS with baclofen pump in place, chronic pain syndrome, diabetes mellitus type II, hypertension, hyperlipidemia, CKD, Non-hodgkin's lymphoma, dehydration, and Obesity who was admitted on 12/23/21 after presenting to ED with altered mental status and identified to be in septic shock. She was intubated on admission and required pressor support for presumed urinary source. She has since weaned from pressor support as well as ventilator and was extubated 12/30/21. Hospitalist was contacted to assume medical management and transfer from ICU. Transferred to floor 12/31.     Septic shock 2/2 UTI, improving  Acute hypoxic respiratory failure 2/2 sepsis, improved  As above, presented with AMS and findings consistent with septic shock requiring pressor support, mechanical ventilation. WBC 34.7 at presentation, procalcitonin 1.63. CT CAP with bibasilar atelectais/infiltrate, air in left renal collecting system raising suspicion for emphysematous pyelitis, cholelithiasis, bilateral oval densities along pelvic sidewall of unclear significance. Initiated on vanco/zosyn on admit, has been on sole tx with zosyn since 12/27.  *BCx x2 on admit positive for staph epidermidis, 1/2 positive for enterococcus faecalis. Repeat blood cultures negative.  *UCx with 10-50k multiple morphotypes without predominant organism. Note appeared to have been drawn after receiving IV Abx  *WBC trending down, value 16.6  - Continue IV Zosyn (12/23), plan for 14d course per ICU  - Wean oxygen as able, BiPAP/CPAP at night per intensivist recs  - RCAT consult  - SLP consulted for swallow eval - advanced to liquid diet    Hypernatremia  Na 153. Nutrition following, free water flushes have been increased. Improved to 149 today  - Continue  free water 120ml q4h  - Monitor BMP    Afib  Developed afib in setting of above on 12/24 requiring amiodarone infusion, has since been transitioned to PO amiodarone. Has since converted to NSR, EKG on admit with evidence of new inferior infarct, RBBB. Tele on 12/31 concerning for ST-segment elevations. Patient is asymptomatic without cp/sob. Had mild troponin elevation on admission, attributed to septic shock.  - Tele  - EKG to eval for ST segment changes, if abnormal will pursue further workup  - TTE  - Continue PO amiodarone, will discuss with cardiology when to discontinue    Acute on CKD, resolved  Baseline Cr nonelevated. Admission value 3.14, improved with resuscitation.  - Monitor BMP  - Avoid nephrotoxins as able    Diabetes mellitus type II  PTA regimen of metformin. Last Hgb A1c 7.1% on 12/24/21.  - Medium ssi  - Accuchecks QID  - Mod CHO diet when able to take PO    Hypertension  Hyperlipidemia  PTA regimen of losartan-hydrochlorothiazide 50-12.5, metoprolol 50mg daily.  - Resume PTA metoprolol, statin  - Continue holding PTA losartan-hydrochlorothiazide, resume as BP allows    Right proximal femur fracture, chronic  Has not been repaired 2/2 pandemic per pt/family decision.    Depression  - PTA duloxetine, amitriptyline         Diet: Adult Formula Drip Feeding: Continuous Vital High Protein; Nasogastric tube; Goal Rate: 65; mL/hr; Medication - Feeding Tube Flush Frequency: At least 15-30 mL water before and after medication administration and with tube clogging; Amount to Send...  Full Liquid Diet    DVT Prophylaxis: SQH  Bobo Catheter: PRESENT, indication: Strict 1-2 Hour I&O, Strict 1-2 Hour I&O  Central Lines: None  Code Status: No CPR- Pre-arrest intubation OK      Disposition Plan   Expected Discharge: 01/05/2022     Anticipated discharge location:  Awaiting care coordination huddle  Delays:     Oxygen Needs  Complex Care            The patient's care was discussed with the Bedside Nurse and  Patient.    Olya Paiz MD  Hospitalist Service  [facility]  Securely message with the [employer] Web Console (learn more here)  Text page via [employer] Paging/Directory        Clinically Significant Risk Factors Present on Admission                    ______________________________________________________________________    Interval History   Patient tolerated a few hours of bipap overnight. Slightly tachypneic today and still requiring 7L oxygen but appears relatively comfortable. She is feeding herself jello on my exam. No acute complaints.    Data reviewed today: I reviewed all medications, new labs and imaging results over the last 24 hours.    Physical Exam   Vital Signs: Temp: 97.8  F (36.6  C) Temp src: Oral BP: (!) 147/66 Pulse: 89   Resp: 24 SpO2: 94 % O2 Device: Nasal cannula Oxygen Delivery: 7 LPM  Weight: 274 lbs 0 oz  Constitutional: Awake, alert, cooperative, some  Increased work of breathing - currently on 7L  HEENT: neck supple, no LAD noted, moist mucous membranes  Respiratory: Clear to auscultation bilaterally, no crackles or wheezing  Cardiovascular: Regular rate and rhythm, normal S1 and S2, and no murmur noted  GI: Normal bowel sounds, soft, non-distended, non-tender  Skin/Integumen: No rashes, no cyanosis, no edema  Ext: no edema, moving all extremities  Neuro: CN 2-12 intact, non focal exam      Data   Recent Labs   Lab 01/01/22  0740 01/01/22  0505 01/01/22  0408 12/31/21  2033 12/31/21  1823 12/31/21  1622 12/31/21  1303 12/31/21  0841 12/31/21  0500 12/30/21  0821 12/30/21  0557   WBC  --  16.2*  --   --   --   --   --   --  16.6*  --  14.8*   HGB  --  11.8  --   --   --   --   --   --  11.4*  --  11.3*   MCV  --  81  --   --   --   --   --   --  82  --  81   PLT  --  255  --   --   --   --   --   --  238  --  191   NA  --  149*  --   --   --   --   --   --  153*  --  152*   POTASSIUM  --  3.5  --   --  3.4  --  3.4  --  3.2*  --  3.5   CHLORIDE  --  117*  --   --    --   --   --   --  119*  --  117*   CO2  --  29  --   --   --   --   --   --  31  --  34*   BUN  --  20  --   --   --   --   --   --  26  --  35*   CR  --  0.74  --   --   --   --   --   --  0.93  --  1.02   ANIONGAP  --  3  --   --   --   --   --   --  3  --  1*   MARY  --  7.9*  --   --   --   --   --   --  7.8*  --  7.8*   * 150* 137*   < >  --    < >  --    < > 151*   < > 147*   ALBUMIN  --  2.0*  --   --   --   --   --   --  1.9*  --  1.8*   PROTTOTAL  --  7.8  --   --   --   --   --   --  7.4  --  7.0   BILITOTAL  --  0.9  --   --   --   --   --   --  1.2  --  1.3   ALKPHOS  --  154*  --   --   --   --   --   --  152*  --  147   ALT  --  265*  --   --   --   --   --   --  347*  --  452*   AST  --  42  --   --   --   --   --   --  49*  --  47*    < > = values in this interval not displayed.     No results found for this or any previous visit (from the past 24 hour(s)).  Medications     baclofen (LIORESAL) intraTHECAL Internal Pump       dextrose       dextrose         amiodarone  400 mg Oral or Feeding Tube BID     [START ON 1/3/2022] amiodarone  400 mg Oral or Feeding Tube Daily     docusate  100 mg Oral or Feeding Tube BID     DULoxetine  60 mg Oral or Feeding Tube BID     heparin ANTICOAGULANT  5,000 Units Subcutaneous Q8H     insulin aspart  1-6 Units Subcutaneous Q4H     multivitamins w/minerals  15 mL Per Feeding Tube Daily     oxyCODONE  10 mg Oral or Feeding Tube Q4H     pantoprazole  40 mg Per Feeding Tube QAM AC    Or     pantoprazole (PROTONIX) IV  40 mg Intravenous QAM AC     piperacillin-tazobactam  4.5 g Intravenous Q6H     polyethylene glycol  17 g Oral or Feeding Tube Daily     sennosides  5 mL Oral or Feeding Tube BID

## 2022-01-01 NOTE — PLAN OF CARE
A&O x 4. VSS ex elevated BP. Pt on 10 L oxymask from [id number], then placed on BiPAP overnight. Pt is bedbound and refused to be repositioned intermittently during the night. Tele SR w/ BBB. Tube feed running at 65 ml/hr with scheduled flushes. Tolerating full liquids. Bobo intact with adequate urine output. LS course crackles. Pt had large incontinent BM this shift. BS are hypoactive. Pain controlled with scheduled Oxycodone.

## 2022-01-02 NOTE — PLAN OF CARE
[id number]    A&O, VSS EX BP elevated and on 6L O2 NC  LS diminished.  BS+, incontinent of BM. Bobo catheter in place with adequate output.  Several pre-existing wounds: underneath breasts bilaterally, panus/groin, coccyx area: RLE skin hard and bumpy, denies pain or itchiness. Wound cares done per plan of care.  Pulses palpable  Tube feed via naso tube at 65cc/hr with scheduled flushes. Tolerating full liquids- can now advance to pureed level 4 with thin liquids  Scheduled oxycodone for pain in back and R hip.     UPDATE 1740,   Pt transferred to station 88, settled and resumed care. VSS, no new changes.

## 2022-01-02 NOTE — PLAN OF CARE
Pt is A&Ox4. VSS ex HTN. On 5-6 L of O2, sating in the low 90s. Refused BiPAP for most of the night. LS diminished, NOGUERA, productive cough. Tele: SR with BBB. R hip pain well managed with scheduled oxycodone. Incontinent of bowel, x2 large soft/watery BM's. Bobo patent with good clear tasha UOP. Wound cares completed per orders. Tube feedings running through nasogastric tube @ goal rate of 65mL/hr with programmed q4h 120mL water flushes. On DD 4 and thin liquids diet, tolerating well, denies N/V. Strict I&O. Up A2 with lift, T/R q2h, on bariatric pulsate mattress. Q4h BG checks, no insulin coverage needed. R PIV SL, infusing intermittent zosyn. K: 3.5. Discharge plan pending.

## 2022-01-02 NOTE — PROGRESS NOTES
[facility]    Medicine Progress Note - Hospitalist Service       Date of Admission:  12/23/2021    Assessment & Plan      Amanda Richardson is a 56 year old female with a past medical history of MS with baclofen pump in place, chronic pain syndrome, diabetes mellitus type II, hypertension, hyperlipidemia, CKD, Non-hodgkin's lymphoma, dehydration, and Obesity who was admitted on 12/23/21 after presenting to ED with altered mental status and identified to be in septic shock. She was intubated on admission and required pressor support for presumed urinary source. She has since weaned from pressor support as well as ventilator and was extubated 12/30/21. Hospitalist was contacted to assume medical management and transfer from ICU. Transferred to floor 12/31.     Septic shock 2/2 UTI, improving  Acute hypoxic respiratory failure 2/2 sepsis, improved  As above, presented with AMS and findings consistent with septic shock requiring pressor support, mechanical ventilation. WBC 34.7 at presentation, procalcitonin 1.63. CT CAP with bibasilar atelectais/infiltrate, air in left renal collecting system raising suspicion for emphysematous pyelitis, cholelithiasis, bilateral oval densities along pelvic sidewall of unclear significance. Initiated on vanco/zosyn on admit, has been on sole tx with zosyn since 12/27.  *BCx x2 on admit positive for staph epidermidis, 1/2 positive for enterococcus faecalis. Repeat blood cultures negative.  *UCx with 10-50k multiple morphotypes without predominant organism. Note appeared to have been drawn after receiving IV Abx  *WBC trending down, value 16.6  - Continue IV Zosyn (12/23), plan for 14d course per ICU  - Wean oxygen as able, BiPAP/CPAP at night per intensivist recs  - RCAT consult  - SLP consulted for swallow eval - advanced diet  - Remove feeding tube/tube feeds if patient continues to tolerate po (ate about 50% of breakfast today)  - loose stools and gas noted  today: hold colace. Simethicone and probiotic ordered. Imodium prn. Low concern for c dif, but follow up symptoms. I anticipate stopping tube feeds might slow stools.  - PT/OT initiated now that pt more stable. Will consider ramso removal as mobility improves. Pt was not into the idea of ramos removal today.    Hypernatremia, improving  Na 153. Nutrition following, free water flushes have been increased. Improved to 146 today  - Continue free water 120ml q4h  - Monitor BMP    Afib  Developed afib in setting of above on 12/24 requiring amiodarone infusion, has since been transitioned to PO amiodarone. Has since converted to NSR, EKG on admit with evidence of new inferior infarct, RBBB. Tele on 12/31 concerning for ST-segment elevations. Patient is asymptomatic without cp/sob. Had mild troponin elevation on admission, attributed to septic shock.  - Tele  - TTE completed  - Per cardiology: Continue amiodarone for now.  Switch to 200 mg p.o. daily at discharge.  She should get a 14-day Zio patch monitor put on at the time of discharge.  She should see cardiology a month after discharge to follow-up with the results of the Zio patch.  If negative for A. fib recurrence, will discontinue amiodarone.  Will hold off on initiation of anticoagulation as this was the first episode of A. fib due to other acute medical issues.  However, her [id number] score is 3 [hypertension, gender, diabetes] and hence if A. fib recurs she would require long-term anticoagulation.    Acute on CKD, resolved  Baseline Cr nonelevated. Admission value 3.14, improved with resuscitation.  - Monitor BMP  - Avoid nephrotoxins as able    Diabetes mellitus type II  PTA regimen of metformin. Last Hgb A1c 7.1% on 12/24/21.  - Medium ssi  - Accuchecks QID    Hypertension  Hyperlipidemia  PTA regimen of losartan-hydrochlorothiazide 50-12.5, metoprolol 50mg daily.  - Resume PTA metoprolol, statin  - Resume PTA losartan-hydrochlorothiazide today    Right  "proximal femur fracture, chronic  Has not been repaired 2/2 pandemic per pt/family decision.    Depression  - PTA duloxetine, amitriptyline         Diet: Adult Formula Drip Feeding: Continuous Vital High Protein; Nasogastric tube; Goal Rate: 65; mL/hr; Medication - Feeding Tube Flush Frequency: At least 15-30 mL water before and after medication administration and with tube clogging; Amount to Send...  Combination Diet Minced and Moist Diet (level 5); Thin Liquids (level 0)    DVT Prophylaxis: SQH  Bobo Catheter: PRESENT, indication: Retention, Strict 1-2 Hour I&O  Central Lines: None  Code Status: No CPR- Pre-arrest intubation OK      Disposition Plan   Expected Discharge: 01/05/2022     Anticipated discharge location:  Awaiting care coordination huddle  Delays:     Oxygen Needs  Complex Care            The patient's care was discussed with the Bedside Nurse and Patient.    Olya Paiz MD  Hospitalist Service  [facility]  Securely message with the [employer] Web Console (learn more here)  Text page via [employer] Paging/Directory        Clinically Significant Risk Factors Present on Admission                    ______________________________________________________________________    Interval History   Patient says she is feeling a bit worse today, mostly due to \"gi issues\" -  gas and some loose stools. She just started po yesterday and advanced to some solids today. We discussed that her gut may be adjusting to orals again. Loose stools are not new. We are going to try some symptomatic management. PT/OT also pending.    Data reviewed today: I reviewed all medications, new labs and imaging results over the last 24 hours.    Physical Exam   Vital Signs: Temp: 97.6  F (36.4  C) Temp src: Axillary BP: (!) 147/65 Pulse: 77   Resp: 22 SpO2: 91 % O2 Device: Nasal cannula Oxygen Delivery: 6 LPM  Weight: 273 lbs 0 oz  Constitutional: Awake, alert, cooperative, some  Increased work of breathing - " currently on 6L  HEENT: neck supple, no LAD noted, moist mucous membranes  Respiratory: Clear to auscultation bilaterally, no crackles or wheezing  Cardiovascular: Regular rate and rhythm, normal S1 and S2, and no murmur noted  GI: Normal bowel sounds, soft, non-distended, non-tender  Skin/Integumen: No rashes, no cyanosis, no edema  Ext: no edema, moving all extremities  Neuro: CN 2-12 intact, non focal exam      Data   Recent Labs   Lab 22  0833 22  0729 22  0407 22  0740 22  0505 21  2033 21  1823 21  0841 21  0500   WBC  --  12.7*  --   --  16.2*  --   --   --  16.6*   HGB  --  10.9*  --   --  11.8  --   --   --  11.4*   MCV  --  83  --   --  81  --   --   --  82   PLT  --  268  --   --  255  --   --   --  238   NA  --  146*  --   --  149*  --   --   --  153*   POTASSIUM  --  3.6  --   --  3.5  --  3.4   < > 3.2*   CHLORIDE  --  113*  --   --  117*  --   --   --  119*   CO2  --  28  --   --  29  --   --   --  31   BUN  --  18  --   --  20  --   --   --  26   CR  --  0.57  --   --  0.74  --   --   --  0.93   ANIONGAP  --  5  --   --  3  --   --   --  3   MARY  --  7.8*  --   --  7.9*  --   --   --  7.8*   * 150* 137*   < > 150*   < >  --    < > 151*   ALBUMIN  --  1.9*  --   --  2.0*  --   --   --  1.9*   PROTTOTAL  --  7.3  --   --  7.8  --   --   --  7.4   BILITOTAL  --  0.6  --   --  0.9  --   --   --  1.2   ALKPHOS  --  132  --   --  154*  --   --   --  152*   ALT  --  183*  --   --  265*  --   --   --  347*   AST  --  39  --   --  42  --   --   --  49*    < > = values in this interval not displayed.     Recent Results (from the past 24 hour(s))   Echocardiogram Complete   Result Value    LVEF  60-65%    [facility]    [id number]  [id number]  [id number]^MARISSA^JOSIAH     [facility]  Echocardiography Laboratory  [address] [place] [id number]     Name: JIMMY SYLVESTER ANA LAURA  MRN: [id number]  : 1965  Study Date:  01/01/2022 11:15 AM  Age: 56 yrs  Gender: Female  Patient Location: [facility]  Reason For Study: Atrial Fibrillation  Ordering Physician: JOSIAH ANN  Referring Physician: Julius Hassan  Performed By: Izabela Baldwin     BSA: 2.4 m2  Height: 69 in  Weight: 274 lb  HR: 79  BP: 161/72 mmHg  ______________________________________________________________________________  Procedure  Complete Portable Echo Adult. Optison (NDC #[id number]) given intravenously.  ______________________________________________________________________________  Interpretation Summary     1. Normal left ventricular size and function. Left ventricular ejection  fraction of 60-65%. No segmental wall motion abnormalities noted.  2. The right ventricle is mildly dilated. The right ventricular systolic  function is normal.  3. Valves not well assessed on this technically difficult study.  Compared to the previous echocardiogram on 2/1/2019, there are no significant  changes. Technically very difficult study.  ______________________________________________________________________________  Left Ventricle  The left ventricle is normal in size. There is concentric remodeling present.  Left ventricular systolic function is normal. The visual ejection fraction is  60-65%. Grade I or early diastolic dysfunction. No regional wall motion  abnormalities noted.     Right Ventricle  The right ventricle is mildly dilated. The right ventricular systolic function  is normal.     Atria  Normal left atrial size. The right atrium is mildly dilated.     Mitral Valve  There is trace mitral regurgitation. There is no mitral valve stenosis.     Tricuspid Valve  The tricuspid valve is not well visualized. Right ventricular systolic  pressure could not be approximated due to inadequate tricuspid regurgitation.     Aortic Valve  The aortic valve is not well visualized. The mean AoV pressure gradient is  12.9 mmHg.     Pulmonic Valve  The pulmonic valve is not well visualized.      Vessels  Normal size aorta. Normal size ascending aorta. IVC diameter and respiratory  changes fall into an intermediate range suggesting an RA pressure of 8 mmHg.     Pericardium  There is no pericardial effusion.     Rhythm  Sinus rhythm was noted.  ______________________________________________________________________________  MMode/2D Measurements & Calculations  IVSd: 1.2 cm     LVIDd: 5.5 cm  LVIDs: 3.0 cm  LVPWd: 1.3 cm  FS: 44.2 %  LV mass(C)d: 283.0 grams  LV mass(C)dI: 119.9 grams/m2  Ao root diam: 3.2 cm  LA dimension: 3.9 cm  asc Aorta Diam: 3.2 cm  LA/Ao: 1.2  LVOT diam: 2.3 cm  LVOT area: 4.2 cm2  LA Volume (BP): 77.3 ml  LA Volume Index (BP): 32.8 ml/m2  RWT: 0.46     Doppler Measurements & Calculations  MV E max oscar: 119.0 cm/sec  MV A max oscar: 112.5 cm/sec  MV E/A: 1.1  MV max P.9 mmHg  MV mean PG: 3.6 mmHg  MV V2 VTI: 40.3 cm  MVA(VTI): 3.7 cm2  MV dec slope: 497.6 cm/sec2  Ao V2 max: 232.4 cm/sec  Ao max P.0 mmHg  Ao V2 mean: 174.4 cm/sec  Ao mean P.9 mmHg  Ao V2 VTI: 52.8 cm  CHRISTA(I,D): 2.8 cm2  CHRISTA(V,D): 2.7 cm2  LV V1 max P.2 mmHg  LV V1 max: 151.3 cm/sec  LV V1 VTI: 36.0 cm  SV(LVOT): 149.7 ml  SI(LVOT): 63.4 ml/m2  PA acc time: 0.08 sec  AV Oscar Ratio (DI): 0.65  CHRISTA Index (cm2/m2): 1.2  E/E' av.6  Lateral E/e': 10.6  Medial E/e': 14.5     ______________________________________________________________________________  Report approved by: Zane Armijo 2022 02:22 PM           Medications     baclofen (LIORESAL) intraTHECAL Internal Pump       dextrose         [START ON 1/3/2022] amiodarone  400 mg Oral or Feeding Tube Daily     docusate  100 mg Oral or Feeding Tube BID     DULoxetine  60 mg Oral or Feeding Tube BID     heparin ANTICOAGULANT  5,000 Units Subcutaneous Q8H     insulin aspart  1-6 Units Subcutaneous Q4H     multivitamins w/minerals  15 mL Per Feeding Tube Daily     oxyCODONE  10 mg Oral or Feeding Tube Q4H     piperacillin-tazobactam  4.5 g  Intravenous Q6H     polyethylene glycol  17 g Oral or Feeding Tube Daily     sennosides  5 mL Oral or Feeding Tube BID

## 2022-01-02 NOTE — PLAN OF CARE
AOx4, slow to respond at times. VSS on 6 L NC ex HTN. Frequent congested cough noted. Tele SR w/BBB. Up A2 and L, T/R q 2 hrs. Minced and moist diet, fair appetite. BS checks q 4 hrs w/sliding scale insulin. C/o 5/10 R hip pain, managed with PRN oxy 1x. PRN simethicone given 1x for abd discomfort. NG tube with TF @ 65 ml/hr with programmed 120 ml free water flushes q 4 hrs. Takes some pills whole, other medication through NG tube per pt preference. PIV SL w/int abx. Bobo with adequate UOP. Incontinent of bowel, 1 smear of watery BM. Multiple skin wounds throughout BLE, wound cares completed per plan of care. Interdry to pannus and breast folds. +2-3 generalized edema. PT, OT, and lymphedema consulted. Enteric precautions maintained. Discharge pending clinical progress.

## 2022-01-02 NOTE — PROGRESS NOTES
SP[facility]  SPIRITUAL ASSESSMENT Progress Note  [facility]     REFERRAL SOURCE: Admission Request    I made a number of attempts to connect with Amanda today but she was asleep during my attempts.     PLAN: [facility] will attempt to connect with pt tomorrow.     Anamika Fay  Associate    Phone: [phone]  Pager: [phone]

## 2022-01-03 NOTE — PROGRESS NOTES
01/03/22 1051   Quick Adds   Quick Adds Edema   Type of Visit Initial Occupational Therapy Evaluation   Living Environment   People in home spouse;child(heidi), adult   Current Living Arrangements house   Living Environment Comments Pt reports living in a split level home with a stair lift and roll-in shower   Self-Care   Usual Activity Tolerance fair   Current Activity Tolerance poor   Equipment Currently Used at Home commode chair;shower chair;wheelchair, manual   Activity/Exercise/Self-Care Comment Pt reports her spouse helps her in/out of bed and in/out of her wheelchair before and after work but that she is home alone up in her wheelchair during the day. She can transfer to the commode (I)ly if needed during the day. She has help to dress her lower body and help for showers at baseline. She is non-ambulatoy but does transfer   Instrumental Activities of Daily Living (IADL)   Previous Responsibilities meal prep;medication management   IADL Comments Spouse and 32 yo son do physical chores   General Information   Onset of Illness/Injury or Date of Surgery 12/23/21   Referring Physician Olya Paiz MD   Patient/Family Therapy Goal Statement (OT) Get better and be able to go home with her spouse and adult son   Additional Occupational Profile Info/Pertinent History of Current Problem Amanda Richardson is a 56 year old female with a past medical history of MS with baclofen pump in place, chronic pain syndrome, diabetes mellitus type II, hypertension, hyperlipidemia, CKD, Non-hodgkin's lymphoma, dehydration, and Obesity who was admitted on 12/23/21 after presenting to ED with altered mental status and identified to be in septic shock. She was intubated on admission and required pressor support for presumed urinary source. She has since weaned from pressor support as well as ventilator and was extubated 12/30/21. She also has a history of a broken R hip 2 years ago that never healed properly and stage 4  Lymphedema   Existing Precautions/Restrictions oxygen therapy device and L/min  (on Bipap)   General Observations and Info Pt has orders for Lymph therapy however pt skin with fibrotic changes and unlikely to be affected by compression wraps.   Cognitive Status Examination   Orientation Status orientation to person, place and time   Affect/Mental Status (Cognitive) WNL   Follows Commands WNL   Sensory   Sensory Comments Diminished sensation BLE   Pain Assessment   Patient Currently in Pain Yes, see Vital Sign flowsheet  (R hip chronic pain)   Edema General Information   Affected Body Part(s) Left LE;Right LE   Edema Etiology Unknown   Edema Precautions Active Cancer   Edema Examination/Assessment   Skin Condition Lipodermatosclerosis;Non-pitting   Skin Condition Comments hyperkeratosis RLE   Stemmer Sign Positive   Pitting Assessment non pitting due to advances stage   Fibrosis Assessment very hard/fibrotic BLE, R>L   Edema Assessment Comments pt has lymph wraps at home that she has used before but reports her legs currently look better than normal   Range of Motion Comprehensive   Comment, General Range of Motion LUE AROM intact. R UE limited at shoulder ROM approx 60 degrees flexion   Strength Comprehensive (MMT)   Comment, General Manual Muscle Testing (MMT) Assessment Unable to lift BLE off bed. LUE 3+/5 at shoulder and 5/5 at elbow/hand. LUE 2/5 at shoulder and 5/5 elbow/hand   Bed Mobility   Comment (Bed Mobility) Max A rolling side to side in bed   Transfers   Transfer Comments A of 2 with ceiling lift bed<>chair   Balance   Balance Comments Able to sit with back supported. Unable to stand   Activities of Daily Living   BADL Assessment lower body dressing;toileting   Lower Body Dressing Assessment   Russell Level (Lower Body Dressing) dependent (less than 25% patient effort)   Toileting   Russell Level (Toileting) dependent (less than 25% patient effort)   Clinical Impression   Criteria for Skilled  Therapeutic Interventions Met (OT) yes   OT Diagnosis Impaired ADL and mobility   Influenced by the following impairments MS with impaired mobility at baseline   OT Problem List-Impairments impacting ADL problems related to;activity tolerance impaired;balance;flexibility;mobility;motor control;range of motion (ROM);sensation;strength;pain;postural control;other (see comments)  (advanced lymphedema)   Assessment of Occupational Performance 5 or more Performance Deficits   Identified Performance Deficits all ADL, mobility, IADL   Planned Therapy Interventions (OT) ADL retraining;balance training;bed mobility training;cognition;motor coordination training;strengthening;transfer training;risk factor education;progressive activity/exercise   Edema: Planned Interventions Gradient compression bandaging;Edema exercises;ADL training   Intervention Comments May not have much benefit from lymphedema wraps due to advanced stage   Clinical Decision Making Complexity (OT) moderate complexity   Therapy Frequency (OT) 5x/week   Predicted Duration of Therapy 10 days   Anticipated Equipment Needs Upon Discharge (OT) shower chair;commode chair;reacher;wheelchair   Risk & Benefits of therapy have been explained evaluation/treatment results reviewed;care plan/treatment goals reviewed;risks/benefits reviewed;current/potential barriers reviewed;participants voiced agreement with care plan;participants included;patient   OT Discharge Planning    OT Discharge Recommendation (DC Rec) Transitional Care Facility;Home with assist;home with home care occupational therapy   OT Rationale for DC Rec At this time would recommend TCU unless able to have 24/7 care and assist of 2 for all transfers. Pt reports she does have access to a vicki lift but has not been using it. If home, home PT/OT recommended   OT Brief overview of current status  Max A bed mob, A of 2 lift bed<>chair. On bipap. Stage 4 lymphedema   Total Evaluation Time (Minutes)   Total  Evaluation Time (Minutes) 10

## 2022-01-03 NOTE — PLAN OF CARE
A&Ox4. VSS, except hypertensive. Tele: SR with 1 degree AV block and BBB. R PIV SL, intermittent abx. Right hip pain managed with PRN oxycodone given at 1000, pain improved to 5/10. Pt displayed increased work of breathing and O2 needs, diminished LS, MD consulted and RT called. PRN neb given and BIPAP started, O2 sats improved to 96%, breathing less labored. Verbal MD order to stop TF temporarily, restarted at 1400. MD order for pt to be NPO for 24 hours d/t potential concerns for aspiration. Potassium WNL, no replacement indicated, repeat lab drawn scheduled for 1/4 am. Incontinent of bowel, small BM smear. Stool softener held d/t large loose stool overnight. Bobo patent with good UOP. A2/lift. T/R Q2H. Q4H blood glucose checks, correction given x1. Discharge plan pending.

## 2022-01-03 NOTE — PROGRESS NOTES
[facility]  Medicine Progress Note - Hospitalist Service       Date of Admission:  12/23/2021    Assessment & Plan      Amanda Richardson is a 56 year old female with a past medical history of MS with baclofen pump in place, chronic pain syndrome, diabetes mellitus type II, hypertension, hyperlipidemia, CKD, Non-hodgkin's lymphoma, dehydration, and Obesity who was admitted on 12/23/21 after presenting to ED with altered mental status and identified to be in septic shock. She was intubated on admission and required pressor support for presumed urinary source. She has since weaned from pressor support as well as ventilator and was extubated 12/30/21. Hospitalist was contacted to assume medical management and transfer from ICU. Transferred to floor 12/31.     Septic shock secondary to staph/enterococcus bacteremia  Acute hypoxic respiratory failure 2/2 sepsis, improved  Suspected UTI  As above, presented with AMS and findings consistent with septic shock requiring pressor support, mechanical ventilation. WBC 34.7 at presentation, procalcitonin 1.63. CT CAP with bibasilar atelectais/infiltrate, air in left renal collecting system raising suspicion for emphysematous pyelitis, cholelithiasis, bilateral oval densities along pelvic sidewall of unclear significance. Initiated on vanco/zosyn on admit, has been on sole tx with zosyn since 12/27.  *BCx x 2 on admit positive for staph epidermidis, 1/2 positive for enterococcus faecalis on both bottles. Repeat blood cultures negative.  *UCx with 10-50k multiple morphotypes without predominant organism. Note appeared to have been drawn after receiving IV Abx  - started on IV Zosyn (12/23). Given staph bacteremia and a device (baclofen pump), will consult ID  - same abx will cover enterococcus  - bacteremia rapidly cleared, unlikely endocarditis, Valves not well assessed on this technically difficult study  - Wean oxygen as able, BiPAP/CPAP at night, or  PRN during day.   - RCAT consult  - SLP consulted for swallow eval - advanced diet. Hold feeding today as dyspnea seems to have worsened. Reassess later today.   - Remove feeding tube/tube feeds when patient continues to tolerate PO  - loose stools improved after colace held. Simethicone and probiotic to continue, Imodium prn. Low concern for c dif, but follow up symptoms. I anticipate stopping tube feeds might slow stools.  - PT/OT consult. Will consider ramos removal as mobility/breathing improves.      Hypernatremia, resolved  Na 153. Nutrition following, free water flushes have been increased. Improved to 146 today  - Continue free water 120ml q4h  - Monitor BMP    Afib, paroxysmal  Dyspnea/chest tightness  Developed afib in setting of above on 12/24 requiring amiodarone infusion, has since been transitioned to PO amiodarone. Has since converted to NSR, EKG on admit with evidence of new inferior infarct, RBBB. Tele on 12/31 concerning for ST-segment elevations. Patient is asymptomatic without cp/sob. Had mild troponin elevation on admission, attributed to septic shock.  - Continue with Tele  - TTE completed- no WMA. LVEF normal. Early diastolic CHF noted.   - Per cardiology: Continue amiodarone for now.  Switch to 200 mg p.o. daily at discharge.  She should get a 14-day Zio patch monitor put on at the time of discharge.  She should see cardiology a month after discharge to follow-up with the results of the Zio patch.  If negative for A. fib recurrence, will discontinue amiodarone.  Will hold off on initiation of anticoagulation as this was the first episode of A. fib due to other acute medical issues.  However, her [id number] score is 3 [hypertension, gender, diabetes] and hence if A. fib recurs she would require long-term anticoagulation.  -patient is quite hypertensive as well, resume metoprolol as well.   -reports chest tightness. Ongoing for days. Get EKG, serial troponin, BNP. Start ASA. Check FLP as  outpatient or when LFTs normalize and can use statin. Weight is trending down.   - check TSH     Acute on CKD, resolved  Baseline Cr nonelevated. Admission value 3.14, improved with resuscitation.  - Monitor BMP  - Avoid nephrotoxins as able    Diabetes mellitus type II  PTA regimen of metformin. Last Hgb A1c 7.1% on 12/24/21.  - Medium ssi  - Accuchecks QID    Hypertension  Hyperlipidemia  PTA regimen of losartan-hydrochlorothiazide 50-12.5, metoprolol 50mg daily.  - Resume PTA metoprolol, statin  - Resume PTA losartan-hydrochlorothiazide today    Right proximal femur fracture, chronic  Has not been repaired 2/2 pandemic per pt/family decision.    Depression  - PTA duloxetine, amitriptyline    Transaminitis  Likely related to septic shock- shock liver, improving.        Diet: Adult Formula Drip Feeding: Continuous Vital High Protein; Nasogastric tube; Goal Rate: 65; mL/hr; Medication - Feeding Tube Flush Frequency: At least 15-30 mL water before and after medication administration and with tube clogging; Amount to Send...  Combination Diet Minced and Moist Diet (level 5); Thin Liquids (level 0)    DVT Prophylaxis: SQH  Bobo Catheter: PRESENT, indication: Retention, Strict 1-2 Hour I&O  Central Lines: None  Code Status: No CPR- Pre-arrest intubation OK      Disposition Plan   Expected Discharge: 01/05/2022     Anticipated discharge location:  Awaiting care coordination huddle  Delays:     Oxygen Needs  Complex Care            The patient's care was discussed with the Bedside Nurse and Patient.    Cody Lee MD  Hospitalist Service  [facility]  Securely message with the Vocera Web Console (learn more here)  Text page via [employer] Paging/Directory        Clinically Significant Risk Factors Present on Admission                    ______________________________________________________________________    Interval History     Chart reviewed, discussed with RN and patient was evaluated this  am  Patient reports ongoing chest tightness for several days, likely worse for 1-2 days. Denies palpitation, dizziness, diaphoresis.   No cough or chest pain, afebrile, no concern for aspiration. Dyspnea has worsened, hypoxia as well has worsened. On 7LPM OM  - loose BM improved, denies abd pain.      Data reviewed today: I reviewed all medications, new labs  results over the last 24 hours. Tele sinus rhythm. CXR and EKG ordered, follow.     Physical Exam   Vital Signs: Temp: 98.6  F (37  C) Temp src: Axillary BP: (!) 174/75 Pulse: 82   Resp: 20 SpO2: 91 % O2 Device: Oxymask Oxygen Delivery: 6 LPM  Weight: 273 lbs 0 oz     Constitutional: Awake, alert, cooperative, very pleasant,  Increased work of breathing - currently on 7L  HEENT: PERRLA EOMI, deniz in place.  Respiratory: very diminished breath sounds, tachypneic with shallow breaths. Crackles and wheezing (+) scattered.   Cardiovascular: Regular rate and rhythm, normal S1 and S2, and no murmur noted  GI: soft, obese, non tender.   Skin/Integumen: No rashes, no cyanosis.   Ext: Chronic skin changes or stasis dermatitis present.    Neuro: CN 2-12 intact, non focal exam      Data   Recent Labs   Lab 01/03/22  0853 01/03/22  0800 01/03/22  0450 01/02/22  0833 01/02/22  0729 01/01/22  0740 01/01/22  0505   WBC  --  13.5*  --   --  12.7*  --  16.2*   HGB  --  11.5*  --   --  10.9*  --  11.8   MCV  --  81  --   --  83  --  81   PLT  --  316  --   --  268  --  255   NA  --  142  --   --  146*  --  149*   POTASSIUM  --  3.5  --   --  3.6  --  3.5   CHLORIDE  --  109  --   --  113*  --  117*   CO2  --  31  --   --  28  --  29   BUN  --  15  --   --  18  --  20   CR  --  0.56  --   --  0.57  --  0.74   ANIONGAP  --  2*  --   --  5  --  3   MARY  --  8.2*  --   --  7.8*  --  7.9*   * 149* 150*   < > 150*   < > 150*   ALBUMIN  --  2.1*  --   --  1.9*  --  2.0*   PROTTOTAL  --  8.2  --   --  7.3  --  7.8   BILITOTAL  --  0.7  --   --  0.6  --  0.9   ALKPHOS  --  147  --    --  132  --  154*   ALT  --  162*  --   --  183*  --  265*   AST  --  39  --   --  39  --  42    < > = values in this interval not displayed.     No results found for this or any previous visit (from the past 24 hour(s)).  Medications     baclofen (LIORESAL) intraTHECAL Internal Pump       dextrose         amiodarone  400 mg Oral or Feeding Tube Daily     DULoxetine  60 mg Oral or Feeding Tube BID     heparin ANTICOAGULANT  5,000 Units Subcutaneous Q8H     insulin aspart  1-6 Units Subcutaneous Q4H     lactobacillus rhamnosus (GG)  1 capsule Oral BID     losartan-hydrochlorothiazide  1 tablet Oral Daily     multivitamins w/minerals  15 mL Per Feeding Tube Daily     piperacillin-tazobactam  4.5 g Intravenous Q6H     polyethylene glycol  17 g Oral or Feeding Tube Daily     sennosides  5 mL Oral or Feeding Tube BID

## 2022-01-03 NOTE — PROGRESS NOTES
SPIRITUAL HEALTH SERVICES  SPIRITUAL ASSESSMENT Progress Note  [place] [id number]     REFERRAL SOURCE: request at admission for chaplaincy care    Amanda gonzalez declined a  visit.    PLAN: Spiritual Health remains available, as needed.    Micki Sauer  Associate   Phone: [phone]

## 2022-01-03 NOTE — PLAN OF CARE
AOx4, slow to . VSS on 6 L NC oxy mask ex HTN. Frequent cough noted. Tele SR w/BBB. Up A2 and L, T/R q 2 hrs. Minced and moist diet, fair appetite. BS checks q 4 hrs w/sliding scale insulin no coverage needed.  Wound cares under breast and in pannus, and coccyx and saccum. Mild bleeding from wound in intergluteal cleft, WOC following. Q2TR. NG tube feed at 65/ml/hr w 120 ml free water flushes every 4 hr. Incontinent bowel, loose stool x1. Bobo in place draining well.

## 2022-01-03 NOTE — PROGRESS NOTES
"CLINICAL NUTRITION SERVICES - REASSESSMENT NOTE      Malnutrition: (12/30)  % Weight Loss:  None noted  % Intake:  </= 50% for >/= 5 days (severe malnutrition) (12/27)  Subcutaneous Fat Loss:  None observed  Muscle Loss:  None observed  Fluid Retention:  Mild - doubt nutritional (12/26)     Malnutrition Diagnosis: Patient does not meet two of the above criteria necessary for diagnosing malnutrition       EVALUATION OF PROGRESS TOWARD GOALS   Diet:    SLP following for diet recs  12/31: Full Liquid  1/1: Pureed, thin liquids  1/2: Minced/Moist (L5), thin liquids (L0)  1/3: NPO - per MD, \"Hold feeding today as dyspnea seems to have worsened. Reassess later today.\"    Nutrition Support:    Type of Feeding Tube: NG  Enteral Frequency:  Continuous  Enteral Regimen: Vital HP at 65 mL/hr  Total Enteral Provisions: 1560 kcal (13 kcal/kg), 136 g protein (2.1 g/kg and 103% needs), 1304 mL H2O, 173 g CHO, no fiber   Free Water Flush: (12/29 - MD)120 mL every 4 hours     Intake/Tolerance:    Chart reviewed  BM x2 yest (miralax/senokot/colace held - last received these on 12/29)  Pt had c/o gas yest - simethicone given  1/2: started Culturell for loose stools    Daily multivitamin    1/3: Na 142         K 3.5         Mg 2.0         Phos 2.9             ASSESSED NUTRITION NEEDS:  Dosing Weight:  123.8 kg (energy - 1/2/21 weight);  66 kg (protein - IBW)  Estimated Energy Needs:  6713-7764 kcals (11-14 Kcal/Kg)  Justification: obese  Estimated Protein Needs: 112-132 grams protein (1.7-2 g pro/Kg)  Justification: wound healing, hypercatabolism with acute illness, obesity guidelines  and CKD      NEW FINDINGS:     Vitals:    12/23/21 2111 12/24/21 0930 12/25/21 0100 12/26/21 0400   Weight: 129.7 kg (285 lb 15 oz) 132 kg (291 lb 0.1 oz) 131.7 kg (290 lb 5.5 oz) 143.8 kg (317 lb)    12/29/21 0400 12/30/21 0400 12/31/21 0200 01/02/22 0500   Weight: 129.1 kg (284 lb 9.6 oz) 126.1 kg (277 lb 15.7 oz) 124.3 kg (274 lb) 123.8 kg (273 lb) "     Wt down - pt was diuresed earlier in admission    Per RN - Multiple skin wounds throughout BLE. Wound cares under breast and in pannus, and coccyx and saccum.  WOCN consult pending        Previous Goals (12/30):   TF will continue to meet % needs while NPO   Evaluation: Met    Previous Nutrition Diagnosis (12/30):   No nutrition diagnosis identified at this time   Evaluation: No change        CURRENT NUTRITION DIAGNOSIS  No nutrition diagnosis identified at this time       INTERVENTIONS  Recommendations / Nutrition Prescription  NPO - diet to resume per MD/SLP    TF as above     Implementation  No intervention at this time    Goals  EN to meet % est needs until pt able to increase po intake      MONITORING AND EVALUATION:  Progress towards goals will be monitored and evaluated per protocol and Practice Guidelines

## 2022-01-03 NOTE — PLAN OF CARE
Pt is A&Ox4. VSS ex HTN. On 5-6 L of O2, sating in the low 90s. Refused BiPAP for most of the night. Crackles ausculted to upper lobes, NOGUERA, productive cough. Tele: SR with BBB. C/o 5/10 R hip pain, gave x1 PRN oxycodone. Incontinent of bowel, x1 large soft BM. Bobo patent with good clear tasha UOP. Wound cares completed per orders. Wounds not improving, left sticky note for WOC to re-consult. Tube feedings running through nasogastric tube @ goal rate of 65mL/hr with programmed q4h 120mL water flushes. On soft and minced and thin liquids diet, tolerating well, denies N/V. Strict I&O. Up A2 with lift, T/R q2h, on bariatric pulsate mattress. Q4h BG checks, covered as needed. New R PIV SL, infusing intermittent zosyn. K: 3.6. Discharge plan pending.

## 2022-01-03 NOTE — PROVIDER NOTIFICATION
MD Notification    Notified Person: MD    Notified Person Name:  Jesus    Notification Date/Time: 1/3/22    Notification Interaction: [id number]    Purpose of Notification: O2 sats are improved on BIPAP, currently 96% on 50% O2. How long would you like the pt to remain on BIPAP? She is asking for it to be discontinued. Also, is it fine for me to restart her tube feeding? Thank you.    Orders Received:    Comments:

## 2022-01-04 NOTE — PROVIDER NOTIFICATION
Contacted by RN regarding need for IMC orders as patient is back on BiPAP. Chart reviewed, appears to be recovering from septic shock, no longer on pressors, but respiratory status worsening. Day team aware. CXR done today. Concern for aspiration, patient now NPO. IMC orders placed.    Epifanio Tello MD, MPH  Internal Medicine

## 2022-01-04 NOTE — PLAN OF CARE
Received patient at 2040 from [facility]. Alert and oriented x4, lethargic. Vital signs stable on BiPAP. Assist of 2 with lift, T/R q2. NPO while on bipap. Tube feeds infusing via NG tube, @ 94cm. LS dim. BS+. BM-. Voiding via ramos. Breann-area and coccyx excoriated. Abd folds and under breasts, red. Wound care done per plan of care. Pain managed with PRN Oxycodone. Denies nausea. Tele SR with 1st degree AVB and BBB.

## 2022-01-04 NOTE — PROVIDER NOTIFICATION
MD Notification    Notified Person: MD    Notified Person Name:  Benjamincaden    Notification Date/Time: 1/3/21 1820    Notification Interaction: [employer] paged    Purpose of Notification: Sats hi 80's on 15L oxymask. Paged RT to place pt back on bipap. Wondering if can get bipap order modified to prn and whether pt is more appropriate for IMC?    Orders Received:    Comments:  Awaiting response

## 2022-01-04 NOTE — PLAN OF CARE
A&Ox4, hypertensive. Tele: SR with 1 degree AV block and BBB. R PIV SL, intermittent abx. Right hip pain managed with PRN oxycodone given, pain improved to 5/10. Pt displayed increased work of breathing and O2 needs,  BiPap resumed by RT and orders to transfer to [facility]. Remains NPO.  Potassium WNL, no replacement indicated, repeat lab drawn scheduled for 1/4/22 am. Incontinent of bowel, small BM smear. Bobo patent with good UOP. A2/lift. T/R Q2H. Q4H blood glucose checks. Transferred to [facility] d/t inability maintain sats off of BiPap. Spouse notified.

## 2022-01-04 NOTE — PROGRESS NOTES
Pt transported from 8th floor to [facility], with BIPAP 60%. No complication

## 2022-01-04 NOTE — PROGRESS NOTES
Jose Enrique's test performed prior to radial ABG draw. Collateral circulation confirmed.  R wrist on BiPAP 60%.         Jessica Garsia, RT  1/4/2022

## 2022-01-04 NOTE — PROGRESS NOTES
[facility]    Infectious Disease Progress Note    Date of Service : 01/04/2022     Assessment:  1. E.fecalis sepsis of urinary source with septic shock /multiorgan dysfunction -respiratoy failure, JULIANNE, elevated LFTs and required ventilation/ pressor support- all improved. Echocardiogram without evidence of endocarditis, blood cxs cleared rapidly which also makes endocarditis less ikely.  2. 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 Has bilateral renal calculi which will need further urology evaluation.  3. S.epidermidis bacteremia likely due to culture contamination. Polymicrobial bacteremia seems unlikely without indwelling cental line etc. Pump site does not appear infected, there is no overlying erythema and CT abdomen twice did not show stranding or fluid collection around pump site.  4. Transminitis related to septic shock -improving  5. JULIANNE resolved  6. Atrial fibrillation, on amiodarone and in sinus rhythm  7. Densities along the pelvic sidewall, unclear whether chronic fluid collection/seroma. or adenopathy. In view of sepsis, wound recommend re evaluation with imaging .  8. Cholelithiasis  9. Chronic medical conditions - DM, HTN, hyperlipidemia     Recommendations:  1. Continue Ampicillin for E.fecalis bacteremia. Complete treatment for a total of 2 weeks from negative blood cxs (12/25 ) until 1/7/2022  2. S.epi likely contaminant and does not require further treatment -has been treated for 10 days alreasdy  3. No obvious sign of pump infection at this time, will need to be monitored when off antibiotics and if has recurrent signs of infection then will need to cosider pump removal  4. Plan repeating blood cxs 5-7 days after antibiotic discontinuation  5. Further urology evaluation for renal calculi    Discussed with the Hospitalist service  Hazel Villagomez MD    Interval History   Remains on BiPAP  , denies abdominal pain, remained afebrile and is tolerating antibiotics without side effects    Antimicrobial therapy  1/3 Ampicillin  prior  12/24-1/3/22 Zosyn  12/24-12/26 Vancomycin    Physical Exam   Temp: 98.2  F (36.8  C) Temp src: Axillary BP: 129/62 Pulse: 65   Resp: 19 SpO2: 96 % O2 Device: BiPAP/CPAP Oxygen Delivery: 14 LPM  Vitals:    12/30/21 0400 12/31/21 0200 01/02/22 0500   Weight: 126.1 kg (277 lb 15.7 oz) 124.3 kg (274 lb) 123.8 kg (273 lb)     Vital Signs with Ranges  Temp:  [97.3  F (36.3  C)-98.3  F (36.8  C)] 98.2  F (36.8  C)  Pulse:  [] 65  Resp:  [11-26] 19  BP: (109-185)/(55-81) 129/62  FiO2 (%):  [14 %-80 %] 60 %  SpO2:  [85 %-100 %] 96 %      GENERAL APPEARANCE:  Awake, has BiPAP mask on  EYES: Eyes grossly normal to inspection  NECK: no adenopathy  RESP: lungs clear   CV: regular rates and rhythm  ABDOMEN: soft, some discomfort over pump site but no erythema or induration  MS: chronic stasis changes, skin thickening and discoloration    Other:    Medications     baclofen (LIORESAL) intraTHECAL Internal Pump       dextrose         amiodarone  400 mg Oral or Feeding Tube Daily     ampicillin  2 g Intravenous Q6H     aspirin  81 mg Oral or Feeding Tube Daily     DULoxetine  60 mg Oral or Feeding Tube BID     furosemide  40 mg Intravenous Once     heparin ANTICOAGULANT  5,000 Units Subcutaneous Q8H     insulin aspart  1-6 Units Subcutaneous Q4H     lactobacillus rhamnosus (GG)  1 capsule Oral BID     [Held by provider] losartan-hydrochlorothiazide  1 tablet Oral Daily     metoprolol tartrate  25 mg Oral or Feeding Tube BID     multivitamins w/minerals  15 mL Per Feeding Tube Daily     pantoprazole  40 mg Oral or Feeding Tube QAM AC     polyethylene glycol  17 g Oral or Feeding Tube Daily     sennosides  5 mL Oral or Feeding Tube BID     sodium chloride (PF)  3 mL Intracatheter Q8H       Data   All microbiology laboratory data reviewed.  Recent Labs   Lab Test 01/04/22  0610  01/03/22  0800 01/02/22  0729   WBC 11.8* 13.5* 12.7*   HGB 10.7* 11.5* 10.9*   HCT 39.7 42.6 41.2   MCV 80 81 83    316 268     Recent Labs   Lab Test 01/04/22  0610 01/03/22  0800 01/02/22  0729   CR 0.62 0.56 0.57     Recent Labs   Lab Test 12/23/21  2121   SED 14     Microbiology  12/23 blood cxs 1 set 2/2 bottles E.fecalis and both sets 2/4 bottles S.epidermidis, 12/25 blood cxs negative        Culture Positive on the 1st day of incubation Abnormal         Enterococcus faecalis Panic     2 of 2 bottles   Staphylococcus epidermidis Panic     1 of 2 bottles         Resulting Agency: [facility]         Susceptibility                Enterococcus faecalis Staphylococcus epidermidis       SUSY SUSY       Ampicillin <=2.0 ug/mL Susceptible           Ciprofloxacin     2.0 ug/mL Intermediate       Clindamycin     >=8.0 ug/mL Resistant       Erythromycin     >=8.0 ug/mL Resistant       Gentamicin     8.0 ug/mL Intermediate       Gentamicin Synergy Susceptible... Susceptible 1           Levofloxacin     4.0 ug/mL Resistant       Oxacillin     <=0.25 ug/mL Susceptible 2       Penicillin 2.0 ug/mL Susceptible           Tetracycline     <=1.0 ug/mL Susceptible       Vancomycin 1.0 ug/mL Susceptible 2.0 ug/mL Susceptible                    Imaging  1/3/2022  EXAM: CT ABDOMEN PELVIS W CONTRAST  LOCATION: [facility]  DATE/TIME: 1/3/2022 5:02 PM     INDICATION: Abdominal abscess/infection suspected.  COMPARISON: None.  TECHNIQUE: CT scan of the abdomen and pelvis was performed following injection of IV contrast. Multiplanar reformats were obtained. Dose reduction techniques were used.  CONTRAST: 135 mL Isovue-[id number].     FINDINGS:   LOWER CHEST: Bibasilar consolidation with tiny amount of pleural fluid. Cardiac enlargement.     HEPATOBILIARY: Calcified gallstones.     PANCREAS: Normal.     SPLEEN: Normal.     ADRENAL GLANDS: Stable mild nodular enlargement of the left adrenal gland.     KIDNEYS/BLADDER:  Stable nonobstructing renal stones.     BOWEL: Nasogastric tube has been removed and replaced with a feeding tube with tip at the gastroduodenal junction.     LYMPH NODES: Normal.     VASCULATURE: Unremarkable.     PELVIC ORGANS: Lipid rich fibroid. Anasarca with ill-defined fluid in the subcutaneous tissues. Bobo catheter in the urinary bladder. Perirectal soft tissue haziness is stable. Bilateral sidewall lymphadenopathy unchanged.     MUSCULOSKELETAL: Surgical changes in the lumbar spine.                                                                      IMPRESSION:   1.  No significant change. There is stable pelvic sidewall lymphadenopathy.     2.  Bibasilar pulmonary consolidation with trace pleural effusions.     3.  Nonobstructing renal stones.     4.  Nasogastric tube has been removed and replaced with a feeding tube with tip at the duodenojejunal junction.     5.  Postsurgical changes in the lumbar spine. Chronic displaced fracture of the right femoral neck.     6.  Cholelithiasis.     7.  Lipid rich fibroid in the uterus.     12/23 Ct chest abdomen pelvis  EXAM: CT CHEST ABDOMEN PELVIS W/O CONTRAST  LOCATION: [facility]  DATE/TIME: 12/23/2021 10:21 PM     INDICATION: Sepsis  COMPARISON: 03/16/2019  TECHNIQUE: CT scan of the chest, abdomen, and pelvis was performed without IV contrast. Multiplanar reformats were obtained. Dose reduction techniques were used.   CONTRAST: None.     FINDINGS:   LUNGS AND PLEURA: Mosaic attenuation. Bibasilar atelectasis or infiltrate. Trace pleural effusions.     MEDIASTINUM/AXILLAE: No adenopathy or pericardial effusion. Endotracheal and enteric tube.     CORONARY ARTERY CALCIFICATION: Mild-to-moderate.     HEPATOBILIARY: Cholelithiasis.     PANCREAS: Normal.     SPLEEN: Normal.     ADRENAL GLANDS: Thickening of the adrenal glands.     KIDNEYS/BLADDER: Multiple, bilateral renal calculi. Larger on the left 7 mm. Small amount of air in the left  renal collecting system. Bobo within the bladder.     BOWEL: Normal caliber.     LYMPH NODES: Bilateral hypodensity along the pelvic sidewall. On the right 4.7 x 2.3 cm series 4 image 256 and on the left 3.9 x 1.7 cm image 262.     VASCULATURE: Atherosclerotic vascular calcification.     PELVIC ORGANS: 1.6 cm fatty lesion within the uterus.     MUSCULOSKELETAL: Postsurgical change lumbar spine. Chronic fracture right proximal femur. Degenerative change osseous structures. Implanted device right ventral abdomen.                                                                      IMPRESSION:  1.  Bibasilar atelectasis or infiltrate and trace pleural effusions.  2.  Small amount of air in the left renal collecting system. Was there recent instrumentation? Correlate for emphysematous pyelitis.  3.  Cholelithiasis. Gallbladder wall thickening not excluded.  4.  Trace amount of free fluid.  5.  Bilateral renal calculi.  6.  1.6 cm fatty lesion within the uterus.  7.  Bilateral oval densities along the pelvic sidewall. Uncertain if these are chronic fluid collection/seroma. Possibility of adenopathy not excluded. Follow-up recommended.     12/23 TTE  Interpretation Summary     1. Normal left ventricular size and function. Left ventricular ejection  fraction of 60-65%. No segmental wall motion abnormalities noted.  2. The right ventricle is mildly dilated. The right ventricular systolic  function is normal.  3. Valves not well assessed on this technically difficult study.  Compared to the previous echocardiogram on 2/1/2019, there are no significant  changes. Technically very difficult study.

## 2022-01-04 NOTE — CONSULTS
"PULMONOLOGY CONSULTATION  Date of service: 1/4/2022    [facility]    _____________________________________________________    Amanda Richardson  56 year old female  [id number]  [address] [place] [address]    Primary Care Provider:  Julius Hassan  Admission Date: 12/23/2021  Hospital Attending Physician:  Stella att. providers found  ________________________________________    CHIEF COMPLAINT : I was asked to see this patient by Dr. Lee for evaluation of acute hypoxemic respiratory failure.    HISTORY OF PRESENT ILLNESS     Per IM note: \"56 year old female with a past medical history of MS with baclofen pump in place, chronic pain syndrome, diabetes mellitus type II, hypertension, hyperlipidemia, CKD, Non-hodgkin's lymphoma, dehydration, and Obesity who was admitted on 12/23/21 after presenting to ED with altered mental status and identified to be in septic shock. She was intubated on admission and required pressor support for presumed urinary source. She has since weaned from pressor support as well as ventilator and was extubated 12/30/21. Hospitalist was contacted to assume medical management and transfer from ICU. Transferred to floor 12/31.\"    On 1/3/22: \"Contacted by RN regarding need for IMC orders as patient is back on BiPAP. Chart reviewed, appears to be recovering from septic shock, no longer on pressors, but respiratory status worsening. Day team aware. CXR done today. Concern for aspiration, patient now NPO. IMC orders placed.\"    HOME MEDICATIONS     Medications Prior to Admission   Medication Sig Dispense Refill Last Dose     acetaminophen (TYLENOL) 325 MG tablet Take 2 tablets (650 mg) by mouth every 4 hours as needed for mild pain 30 tablet 1 Unknown at Unknown time     aspirin (ASA) 81 MG chewable tablet Take 162 mg by mouth every morning    Unknown at Unknown time     atorvastatin (LIPITOR) 10 MG tablet Take 1 tablet (10 mg) by mouth daily 90 tablet 3 Unknown at Unknown " time     B Complex Vitamins (B COMPLEX PO) Take 1 tablet by mouth daily   Unknown at Unknown time     baclofen (LIORESAL) 10 MG tablet 10 mg by Per G Tube route 3 times daily as needed for muscle spasms In addition to pump   Unknown at Unknown time     baclofen (LIORESAL) intraTHECAL Internal Pump by Intrathecal route continuous prn (467.6 mcg/24h simple continuous infusion) Dr. [facility] manages, pump refill due 2022. Settings last updated 2021.   2021 at Unknown time     CHLORPHENIRAMINE-ACETAMINOPHEN PO Take 2 tablets by mouth Tablet contains acetaminophen 500 mg, dextromethorphan 15 mg, chlorpheniramine 2 mg   Unknown at Unknown time     Chlorpheniramine-DM (COUGH & COLD HBP) 4-30 MG TABS Take 1 tablet by mouth every 6 hours as needed   Unknown at Unknown time     [] doxycycline hyclate (VIBRA-TABS) 100 MG tablet Take 1 tablet (100 mg) by mouth 2 times daily for 10 days 20 tablet 0 2021 at 15 tablets remain in Rx bottle     DULoxetine (CYMBALTA) 60 MG capsule Take 1 capsule (60 mg) by mouth 2 times daily 180 capsule 3 Unknown at Unknown time     Hesperidin-Diosmin 250-650 MG TABS 2 tabs daily per wound care 180 tablet 3 Unknown at Unknown time     ibuprofen (ADVIL/MOTRIN) 200 MG tablet Take 200 mg by mouth every 4 hours as needed for mild pain   Unknown at Unknown time     losartan-hydrochlorothiazide (HYZAAR) 50-12.5 MG tablet Take 1 tablet by mouth daily 90 tablet 3 Unknown at Unknown time     metFORMIN (GLUCOPHAGE) 500 MG tablet 1 Tabs QAM and 2 Tabs  tablet 3 Unknown at Unknown time     metoprolol succinate ER (TOPROL-XL) 50 MG 24 hr tablet Take 1 tablet (50 mg) by mouth daily 90 tablet 3 Unknown at Unknown time     Multiple Vitamin (MULTI-VITAMIN PO) Take 1 tablet by mouth daily    Unknown at Unknown time     naloxone (NARCAN) 4 MG/0.1ML nasal spray Spray 1 spray (4 mg) into one nostril alternating nostrils once as needed for opioid reversal Every 2-3 minutes  until patient responsive or EMS arrives 0.2 mL 0 Unknown at spouse confirmed pt has home supply     omega-3 fatty acids (FISH OIL) 1200 MG capsule Take 1 capsule by mouth daily.   Unknown at Unknown time     oxyCODONE IR (ROXICODONE) 15 MG tablet Take 1 tablet (15 mg) by mouth every 6 hours as needed for moderate to severe pain Limit 4 tablet(s) per day. 120 tablet 0 Unknown at Unknown time     senna-docusate (SENOKOT-S/PERICOLACE) 8.6-50 MG tablet Take 2 tablets by mouth daily as needed for constipation 180 tablet 0 Unknown at Unknown time     Vitamin D3 (CHOLECALCIFEROL) 125 MCG (5000 UT) tablet Take 2 tablets by mouth every morning   Unknown at Unknown time     amitriptyline (ELAVIL) 100 MG tablet Take 1 tablet (100 mg) by mouth At Bedtime (Patient not taking: Reported on 12/24/2021) 90 tablet 3 Not Taking at Unknown time     blood glucose (ACCU-CHEK KEL) test strip Use to test blood sugar 1 times daily or as directed. 100 strip 5      blood glucose (NO BRAND SPECIFIED) lancets standard Use to test blood sugar 1 times daily or as directed. 100 each 5      blood glucose calibration (NO BRAND SPECIFIED) solution Use to calibrate blood glucose monitor as needed as directed. 1 each 0      blood glucose monitoring (ACCU-CHEK KEL PLUS) meter device kit Use to test blood sugar 1 times daily or as directed. 1 kit 0      blood glucose monitoring (ACCU-CHEK MULTICLIX) lancets Use to test blood sugar 1 times daily or as directed. 100 each 5        PAST MEDICAL HISTORY      Past Medical History:   Diagnosis Date     Abnormality of gait 07/27/2012     Arrhythmia     with sepsis     Chronic pain     [facility] - yearly, next in summer 2018     CKD (chronic kidney disease) stage 1, GFR 90 ml/min or greater     kidney stones     Colon polyps 01/2015    tubular adenomas x 2     Depressive disorder      Gallstone 06/11/2012     Hyperlipidemia LDL goal <70      Hypertension goal BP (blood pressure) < 140/90       Leukocytosis 06/11/2012     Moderate depressive episode (H)      MS (multiple sclerosis) (H) 2003    Dr Vigil/Gaby - [facility]     Multiple sclerosis (H)      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 Cystoscopy 2. LEFT retrograde pyelogram 3. LEFT JJ stent placement 4. <1hr physician fluoroscopy time;  Surgeon: Epifanio Sapp MD;  Location: [facility] OR     COMBINED CYSTOSCOPY, RETROGRADES, URETEROSCOPY, LASER HOLMIUM LITHOTRIPSY URETER(S), INSERT STENT Left 3/15/2019    Procedure: Cystoscopy, left ureteral stent exchange, left retrograde pyelogram, interpretation of fluoroscopic images, left ureteroscopy with holmium lithotripsy and stone basketing, 22 modifier for difficult lengthy case.;  Surgeon: Mayito Chauhan MD;  Location: [facility] OR     CYSTOSCOPY       CYSTOSCOPY, REMOVE STENT(S), COMBINED Left 3/27/2019    Procedure: Flexible cystoscopy with left ureteral stent removal;  Surgeon:  Mayito Chauhan MD;  Location: [facility] OR     FUSION LUMBAR ANTERIOR, FUSION LUMBAR POSTERIOR TWO LEVELS, COMBINED  10/17/2013    lumbar fusion - Dr Floyd     INSERT PUMP BACLOFEN  2017    intrathecal baclofen pump implantation     IRRIGATION AND DEBRIDEMENT LOWER EXTREMITY, COMBINED Right     Right ankle I&D d/t infection     OPEN REDUCTION INTERNAL FIXATION ANKLE  5/15    Right Bimalleolar ankle fx ORIF     OPEN REDUCTION INTERNAL FIXATION ANKLE Right 2015    Revision due to spasms pulling screws out of ankle     SINUS SURGERY      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     XR LUMBAR EPIDURAL INJECTION INCL IMAGING  3/14    Left L4-5 Epidural Dr Winter       ALLERGIES     Allergies   Allergen Reactions     Lisinopril Angioedema     Lip swelling     Flexeril [Cyclobenzaprine Hcl] Other (See Comments)     confusion       SOCIAL / SUBSTANCE HISTORY     Social History     Socioeconomic History     Marital status:      Spouse name: Fernando     Number of children: 3     Years of education: 14     Highest education level: Not on file   Occupational History     Employer: UNEMPLOYED   Tobacco Use     Smoking status: Former Smoker     Packs/day: 0.50     Years: 31.00     Pack years: 15.50     Types: Cigarettes     Quit date: 2013     Years since quittin.4     Smokeless tobacco: Never Used     Tobacco comment: since age 17   Vaping Use     Vaping Use: Never used   Substance and Sexual Activity     Alcohol use: No     Drug use: No     Sexual activity: Yes     Partners: Male   Other Topics Concern     Parent/sibling w/ CABG, MI or angioplasty before 65F 55M? No      Service Not Asked     Blood Transfusions Not Asked     Caffeine Concern Yes     Comment: occas     Occupational Exposure Not Asked     Hobby Hazards Not Asked     Sleep Concern Not Asked     Stress Concern Not Asked     Weight Concern Not Asked     Special Diet Not Asked     Back Care Not Asked     Exercise Yes  "    Comment: as able     Bike Helmet Not Asked     Seat Belt Yes     Self-Exams Not Asked   Social History Narrative     Not on file     Social Determinants of Health     Financial Resource Strain: Not on file   Food Insecurity: Not on file   Transportation Needs: Not on file   Physical Activity: Not on file   Stress: Not on file   Social Connections: Not on file   Intimate Partner Violence: Not on file   Housing Stability: Not on file       FAMILY HISTORY     Family History   Problem Relation Age of Onset     C.A.D. Father         with CHF      Cancer Father         ? unsure type - abdominal      Hypertension Mother      Thyroid Disease Mother         goiter      Breast Cancer Sister 58     Thyroid Disease Son      Cancer Paternal Grandfather      Cancer - colorectal No family hx of        REVIEW OF SYSTEMS   A comprehensive review of systems was negative except for items noted in HPI/Subjective.    PHYSICAL EXAMINATION   Temp (24hrs), Av.9  F (36.6  C), Min:97.3  F (36.3  C), Max:98.3  F (36.8  C)    Vital signs:  Temp: 97.6  F (36.4  C) Temp src: Axillary BP: 132/66 Pulse: 70   Resp: 13 SpO2: 99 % O2 Device: Oxymask Oxygen Delivery: 15 LPM Height: 175.3 cm (5' 9.02\") Weight: 123.8 kg (273 lb)  Estimated body mass index is 40.3 kg/m  as calculated from the following:    Height as of this encounter: 1.753 m (5' 9.02\").    Weight as of this encounter: 123.8 kg (273 lb).        I/O last 3 completed shifts:  In: 930 [NG/GT:540]  Out: 2380 [Urine:2380]    CONSTITUTIONAL/GENERAL APPEARANCE: Lethargic female. No Apparent Distress.  PSYCHIATRIC: Pleasant and appropriate mood and affect. Oriented x 3.  EARS, NOSE,THROAT,MOUTH: External ears and nose overall normal.  NECK: Neck appearance normal. No neck masses and the thyroid not enlarged.   RESPIRATORY: Non-labored effort. Decreased BS anteriorly, sl coarse.  CARDIOVASCULAR: S1, S2, regular rate and rhythm.  ABDOMEN: round, soft, non-tender, non-distended, no palpable " masses. No presence of hernia.  LYMPHATIC: no cervical or axillary lymphadenopathy.  SKIN: Skin color was normal. No clubbing or cyanosis. No palpable skin abnormalities.    LABORATORY ASSESSMENT    Arterial Blood Gas  Recent Labs   Lab 12/28/21  1104   PH 7.37   PCO2 57*   PO2 105   HCO3 33*   O2PER 80     CBC  Recent Labs   Lab 01/04/22  0610 01/03/22  0800 01/02/22  0729 01/01/22  0505   WBC 11.8* 13.5* 12.7* 16.2*   RBC 4.95 5.28* 4.99 5.51*   HGB 10.7* 11.5* 10.9* 11.8   HCT 39.7 42.6 41.2 44.5   MCV 80 81 83 81   MCH 21.6* 21.8* 21.8* 21.4*   MCHC 27.0* 27.0* 26.5* 26.5*   RDW 22.2* 21.8* 21.9* 22.2*    316 268 255     BMP  Recent Labs   Lab 01/04/22  0610 01/04/22  0403 01/04/22  0055 01/03/22  2159 01/03/22  0853 01/03/22  0800 01/02/22  0833 01/02/22  0729 01/01/22  0740 01/01/22  0505     --   --   --   --  142  --  146*  --  149*   POTASSIUM 4.2  --   --   --   --  3.5  --  3.6  --  3.5   CHLORIDE 108  --   --   --   --  109  --  113*  --  117*   MARY 8.4*  --   --   --   --  8.2*  --  7.8*  --  7.9*   CO2 31  --   --   --   --  31  --  28  --  29   BUN 19  --   --   --   --  15  --  18  --  20   CR 0.62  --   --   --   --  0.56  --  0.57  --  0.74   * 113* 131* 109*   < > 149*   < > 150*   < > 150*    < > = values in this interval not displayed.     INRNo lab results found in last 7 days.   BNPNo lab results found in last 7 days.  VENOUS BLOOD GASESNo lab results found in last 7 days.      Additional labs and/or comments:    IMAGING      CXR 1/3 -  IMPRESSION: New feeding tube courses below the diaphragm. Endotracheal  tube, right IJ CVC, and NG tube have been removed. Cardiomegaly. Mild  infiltrate or atelectasis in the lung bases, mildly improved on the  left and stable on the right. No new findings.    CXR 12/27 -  IMPRESSION: Endotracheal tube tip approximately 6.5 cm from the  ron. Right IJ line stable. Probable increase in left lower lobe  atelectasis and/or infiltrate.  Increased groundglass infiltrates at  the right base compared to previous.    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  Patient was admitted on 12/23/2021 after presenting with altered mental status and found to be in septic shock.  She was intubated on admission and required pressor support.  Patient was extubated 12/30/2021 and transferred to the floor 12/31/2021.  She was treated with Zosyn and changed to ampicillin on 1/3/2021 per ID.  On 1/3/2022 patient developed increasing respiratory distress requiring BiPAP support.  Repeat chest x-ray showed mild infiltrate versus atelectasis at the lung bases, mildly improved on the left and stable on the right.  Patient was felt to have possibly aspirated and has been made NPO.  Defer to ID whether antibiotics should be broadened.  Agree with NPO status and aspiration precautions including elevating the head of the bed.  Respiratory status remains tenuous at present.    PLAN:  1. Adjust oxygen, keep SaO2 > 90%  2. BiPAP prn.  3. Antibiotics - Ampicillin; defer to ID whether antibiotics should be broadened  4. NPO, elevate HOB, aspiration precautions  5. Follow CXR.    Thanks, will follow.      Adrian Porras M.D.    [facility]  [phone]   (pager)  [phone]   (office)

## 2022-01-04 NOTE — PROGRESS NOTES
[facility]  Medicine Progress Note - Hospitalist Service       Date of Admission:  12/23/2021    Assessment & Plan      Amanda Richardson is a 56 year old female with a past medical history of MS with baclofen pump in place, chronic pain syndrome, diabetes mellitus type II, hypertension, hyperlipidemia, CKD, Non-hodgkin's lymphoma, dehydration, and Obesity who was admitted on 12/23/21 after presenting to ED with altered mental status and identified to be in septic shock. She was intubated on admission and required pressor support for presumed urinary source. She has since weaned from pressor support as well as ventilator and was extubated 12/30/21. Hospitalist was contacted to assume medical management and transfer from ICU. Transferred to floor 12/31.     Septic shock secondary to  enterococcus bacteremia  Acute hypoxic respiratory failure 2/2 sepsis, improved  Acute hyercapneic respiratory failure and encephalopathy  Suspected UTI  As above, presented with AMS and findings consistent with septic shock requiring pressor support, mechanical ventilation. WBC 34.7 at presentation, procalcitonin 1.63. CT CAP with bibasilar atelectais/infiltrate, air in left renal collecting system raising suspicion for emphysematous pyelitis, cholelithiasis, bilateral oval densities along pelvic sidewall of unclear significance. Initiated on vanco/zosyn on admit, has been on sole tx with zosyn since 12/27.  *BCx x 2 on admit positive for staph epidermidis, 1/2 positive for enterococcus faecalis on both bottles. Repeat blood cultures negative.  *UCx with 10-50k multiple morphotypes without predominant organism. Note appeared to have been drawn after receiving IV Abx  - started on IV Zosyn (12/23). Given 2/2 staph bacteremia and a device (baclofen pump), ID consulted, felt staph epidermidis present on both sets 1 bottle each is contaminant.   - Given enterococcus as well in blood, abx changed to ampicillin.     - Repeat CT abd pelvis with no abscess/fluid collection.  - respiratory status worsened 1/3, CXR shows improment from prior though. Now needing BiPAP mostly.   - ABG noted, hypercarbia- PCO2 65, compensated though, mentation has improved after being on BiPAP.   - Suspect patient has DARWIN, recommend sleep study as outpatient.  - ProBNP elevated, diuresed with lasix with good output this am, If output slows down, repeat lasix later today or else repeat dose in AM.   - Pulm consulted, appreciate assistance.   - RCAT consult  - SLP evaluated patient and diet was advanced but given worsened respiratory status, on BiPAP, keep NPO, continue tube feeding.     - loose stools improved after colace held. Simethicone and probiotic to continue, Imodium prn. Low concern for c dif, but follow up symptoms. I anticipate stopping tube feeds might slow stools.  - PT/OT consult. Will consider ramos removal as mobility/breathing improves.      Hypernatremia, resolved  Na 153. Nutrition following, free water flushes have been increased. Improved to 146 today  - Continue free water 120ml q4h  - Monitor BMP    Afib, paroxysmal  Dyspnea/chest tightness  Suspected acute diastolic CHF/fluid overload  Developed afib in setting of above on 12/24 requiring amiodarone infusion, has since been transitioned to PO amiodarone. Has since converted to NSR, EKG on admit with evidence of new inferior infarct, RBBB. Tele on 12/31 concerning for ST-segment elevations. Patient is asymptomatic without cp/sob. Had mild troponin elevation on admission, attributed to septic shock.  - Continue with Tele  - TTE completed- no WMA. LVEF normal. Early diastolic CHF noted. Now diuresing as above.  - Per cardiology: Continue amiodarone for now.  Switch to 200 mg p.o. daily at discharge.  She should get a 14-day Zio patch monitor put on at the time of discharge.  She should see cardiology a month after discharge to follow-up with the results of the Zio patch.  If  negative for A. fib recurrence, will discontinue amiodarone.  Will hold off on initiation of anticoagulation as this was the first episode of A. fib due to other acute medical issues.  However, her [id number] score is 3 [hypertension, gender, diabetes] and hence if A. fib recurs she would require long-term anticoagulation.  -patient is quite hypertensive as well, resumed metoprolol    -reported chest tightness. Ongoing for days.  EKG without acute ischemia and serial troponin negative. Likely due to pulm congestion, diuresis as above.    Acute on CKD, resolved  Baseline Cr nonelevated. Admission value 3.14, improved with resuscitation.  - Monitor BMP now with diuresis.  - Avoid nephrotoxins as able    Diabetes mellitus type II  PTA regimen of metformin. Last Hgb A1c 7.1% on 12/24/21.  - Medium ssi  - Accuchecks QID    Hypertension  Hyperlipidemia  PTA regimen of losartan-hydrochlorothiazide 50-12.5, metoprolol 50mg daily.  - Resumed PTA metoprolol. Statin eventually once LFTs normalize  - Resumed PTA losartan-hydrochlorothiazide- but with brisk diuresis, hold it.    Right proximal femur fracture, chronic  Has not been repaired 2/2 pandemic per pt/family decision.    Depression  - PTA duloxetine, amitriptyline    Transaminitis  Likely related to septic shock- shock liver, improving.        Diet: Adult Formula Drip Feeding: Continuous Vital High Protein; Nasogastric tube; Goal Rate: 65; mL/hr; Medication - Feeding Tube Flush Frequency: At least 15-30 mL water before and after medication administration and with tube clogging; Amount to Send...  NPO for Medical/Clinical Reasons Except for: Meds    DVT Prophylaxis: SQH  Bobo Catheter: PRESENT, indication: Strict 1-2 Hour I&O;Retention, Strict 1-2 Hour I&O  Central Lines: None  Code Status: No CPR- Pre-arrest intubation OK      Disposition Plan   Expected Discharge: 01/07/2022     Anticipated discharge location:  Awaiting care coordination huddle  Delays:     Oxygen  Needs  PT Consult            The patient's care was discussed with the Bedside Nurse and Patient and her  over the phone    Cody Lee MD  Hospitalist Service  [facility]  Securely message with the Vocera Web Console (learn more here)  Text page via [employer] Paging/Directory        Clinically Significant Risk Factors Present on Admission                    ______________________________________________________________________    Interval History     Overnight events reviewed, discussed with nursing staff and evaluated patient this afternoon. Patient was increasingly dyspneic last evening and was transferred to [facility] on continuous BiPAP.  Patient remained on BiPAP overnight, came off this morning. Patient subsequently became gradually more lethargic per nursing staff was placed on BiPAP 14/7/60% FiO2.   -Patient has become more alert since being on BiPAP.  She reports her breathing is about the same.  Denies chest pain, nausea.  Remains afebrile.  -Lasix 40 mg IV was given this morning, patient has made about 1.7L urine with it.     Data reviewed today: I reviewed all medications, new labs  results over the last 24 hours. Tele sinus rhythm. CXR and EKG ordered, follow.     Physical Exam   Vital Signs: Temp: 98.2  F (36.8  C) Temp src: Axillary BP: 129/62 Pulse: 65   Resp: 19 SpO2: 96 % O2 Device: BiPAP/CPAP Oxygen Delivery: 14 LPM  Weight: 273 lbs 0 oz     Constitutional: Awake, alert, cooperative, very pleasant, on BiPAP-comfortable  HEENT: PERRLA EOMI, deniz in place.  Respiratory: breath sounds are diminished, crackles bilaterally, occ wheezing present, on BiPAP.   Cardiovascular: Regular rate and rhythm, normal S1 and S2, and no murmur noted  GI: soft, obese, non tender.   Skin/Integumen: No rashes, no cyanosis.   Ext: Chronic skin changes or stasis dermatitis present.    Neuro: CN 2-12 intact, non focal exam      Data   Recent Labs   Lab 01/04/22  1216 01/04/22  0610  01/04/22  0403 01/03/22  0853 01/03/22  0800 01/02/22  0833 01/02/22  0729   WBC  --  11.8*  --   --  13.5*  --  12.7*   HGB  --  10.7*  --   --  11.5*  --  10.9*   MCV  --  80  --   --  81  --  83   PLT  --  298  --   --  316  --  268   NA  --  143  --   --  142  --  146*   POTASSIUM  --  4.2  --   --  3.5  --  3.6   CHLORIDE  --  108  --   --  109  --  113*   CO2  --  31  --   --  31  --  28   BUN  --  19  --   --  15  --  18   CR  --  0.62  --   --  0.56  --  0.57   ANIONGAP  --  4  --   --  2*  --  5   MARY  --  8.4*  --   --  8.2*  --  7.8*   * 117* 113*   < > 149*   < > 150*   ALBUMIN  --  1.9*  --   --  2.1*  --  1.9*   PROTTOTAL  --  7.8  --   --  8.2  --  7.3   BILITOTAL  --  0.5  --   --  0.7  --  0.6   ALKPHOS  --  135  --   --  147  --  132   ALT  --  129*  --   --  162*  --  183*   AST  --  59*  --   --  39  --  39    < > = values in this interval not displayed.     Recent Results (from the past 24 hour(s))   CT Abdomen Pelvis w Contrast    Narrative    EXAM: CT ABDOMEN PELVIS W CONTRAST  LOCATION: [facility]  DATE/TIME: 1/3/2022 5:02 PM    INDICATION: Abdominal abscess/infection suspected.  COMPARISON: None.  TECHNIQUE: CT scan of the abdomen and pelvis was performed following injection of IV contrast. Multiplanar reformats were obtained. Dose reduction techniques were used.  CONTRAST: 135 mL Isovue-370.    FINDINGS:   LOWER CHEST: Bibasilar consolidation with tiny amount of pleural fluid. Cardiac enlargement.    HEPATOBILIARY: Calcified gallstones.    PANCREAS: Normal.    SPLEEN: Normal.    ADRENAL GLANDS: Stable mild nodular enlargement of the left adrenal gland.    KIDNEYS/BLADDER: Stable nonobstructing renal stones.    BOWEL: Nasogastric tube has been removed and replaced with a feeding tube with tip at the gastroduodenal junction.    LYMPH NODES: Normal.    VASCULATURE: Unremarkable.    PELVIC ORGANS: Lipid rich fibroid. Anasarca with ill-defined fluid in the  subcutaneous tissues. Bobo catheter in the urinary bladder. Perirectal soft tissue haziness is stable. Bilateral sidewall lymphadenopathy unchanged.    MUSCULOSKELETAL: Surgical changes in the lumbar spine.      Impression    IMPRESSION:   1.  No significant change. There is stable pelvic sidewall lymphadenopathy.    2.  Bibasilar pulmonary consolidation with trace pleural effusions.    3.  Nonobstructing renal stones.    4.  Nasogastric tube has been removed and replaced with a feeding tube with tip at the duodenojejunal junction.    5.  Postsurgical changes in the lumbar spine. Chronic displaced fracture of the right femoral neck.    6.  Cholelithiasis.    7.  Lipid rich fibroid in the uterus.     Medications     baclofen (LIORESAL) intraTHECAL Internal Pump       dextrose         amiodarone  400 mg Oral or Feeding Tube Daily     ampicillin  2 g Intravenous Q6H     aspirin  81 mg Oral or Feeding Tube Daily     DULoxetine  60 mg Oral or Feeding Tube BID     furosemide  40 mg Intravenous Once     heparin ANTICOAGULANT  5,000 Units Subcutaneous Q8H     insulin aspart  1-6 Units Subcutaneous Q4H     lactobacillus rhamnosus (GG)  1 capsule Oral BID     [Held by provider] losartan-hydrochlorothiazide  1 tablet Oral Daily     metoprolol tartrate  25 mg Oral or Feeding Tube BID     multivitamins w/minerals  15 mL Per Feeding Tube Daily     pantoprazole  40 mg Oral or Feeding Tube QAM AC     polyethylene glycol  17 g Oral or Feeding Tube Daily     sennosides  5 mL Oral or Feeding Tube BID     sodium chloride (PF)  3 mL Intracatheter Q8H

## 2022-01-04 NOTE — PLAN OF CARE
SLP Swallow Tx Update - Decreased alertness and oral-pharyngeal function noted this am while pt was on 14-15L via Oxymask.  Given deficits and overall decreased respiratory status, recommend continued NPO and frequent oral cares today.  Will continue to assess function in swallow Tx as able (see discharge planner/rehab flowsheet for daily notes).

## 2022-01-05 NOTE — PLAN OF CARE
Assumed care [id number]. AOx4 at beginning of shift, on BiPAP from overnights. Switched to oxymask per pt request @ 14 L. BiPAP @ 60% reapplied due to change in LOC and increased lethargy, AO to self only. ABGs ordered and drawn. LOC and orientation x4 by end of shift. Bobo present and raining, NG remains in place w/ goal tube feedings. Tele sinus w/ 1st degree AV block. Blood sugars remained stable with no insulin coverage needed today. Speech continued NPO status after seeing this AM, will reassess in 1/5. PT applied bilateral leg wraps, will stay in place until 10:00 1/5. PRN oxycodone given x1 for pain, pt refused PRN baclofen.

## 2022-01-05 NOTE — PLAN OF CARE
Alert and oriented x3-4. Disoriented to place. Vital signs stable on BiPAP. Assist of 2 with lift, T/R. NPO while on bipap, oral cares provided. Tube feeds infusing at goal 65ml/hr via NJ tube, @ 94cm. LS dim. BS+. BM-. Voiding via ramos. Ramos was leaking, large stone x2 observed when old ramos was removed, hospitalist notified by charge RN. Breann-area and sacrum macerated. Abd folds and under breasts, red. Wound care done per plan of care. R arm +3-4 edema, warm, reddened, CMS intact, denies pain on arm; elevated on pillows. Pain managed with PRN Oxycodone. Denies nausea. Tele SR with 1st degree AVB and BBB.

## 2022-01-05 NOTE — PROGRESS NOTES
"[facility]  WOC Nurse Inpatient Wound Assessment     Reason for consultation: Evaluate and treat \"bilateral posterior thighs, buttocks, coccyx, vaginal area\" \"coccyx/sacrum, groin\"    Assessment  ABD folds, under breast, axilla  skin breakdown due to Moisture Associated Skin Damage (MASD) and suspected fungal vs bacterial rash     Buttock skin breakdown due to moisture, friction, pressure, chronic illness     Posterior thighs and groin and perineal skin breakdown due to MASD     Treatment Plan     Wound care  EVERY SHIFT        Comments: Location: buttock, posterior thighs, groin, perineal   Care: provided qshift by primary RN and/or PCA   1. Cleanse with warm water and baby shampoo and wash cloths (or with \"Medline sooth and cool no rinse shampoo and body wash\" and Rg soft wash cloths). Then dry well   2. Apply 3M Cavilon No Sting barrier film to all damaged skin, hold skin folds apart for 30 seconds to dry         Wound care  Start:  01/05/22 1745,   EVERY SHIFT,   Routine        Comments: Location: abd folds, under breasts, axilla   Care: provided qshift by primary RN and/or PCA   1. Cleanse abd folds with warm water and baby shampoo and wash cloths (or with \"Medline soothe and cool no rinse shampoo and body wash\" and Rg soft wash cloths). Then dry well   2. Apply Interdry fabric to folds, per manufacture instructions on box. Cut pieces Large, need to be large enough to have at least 2\" of fabric hanging outside the skin fold while in place. Ok to use each piece up to 5 days   3. No powders or creams at all in to the abd folds, just interdry placed at the base of the skinfold wicking the moisture and treating the skin with Ag (silver) impregnated in the fabric                Orders Updated  Recommended provider order: None, at this time  WOC Nurse follow-up plan: weekly  Nursing to notify the Provider(s) and re-consult the WOC Nurse if wound(s) deteriorates or new skin " "concern.    Patient History  According to provider note(s):  \"56-year-old woman with complex history including non-Hodgkin's lymphoma, multiple sclerosis on baclofen pump, hypertension, DM2, with recurrent infections who was admitted 12/23 for decreased level of consciousness. Possible concern for codeine excess.\"    Objective Data  Containment of urine/stool: Incontinence Protocol, Incontinent pad in bed and Indwelling catheter    Active Diet Order:  Orders Placed This Encounter      NPO for Medical/Clinical Reasons Except for: Meds    Output:   I/O last 3 completed shifts:  In: 3690 [NG/GT:3040]  Out: 2950 [Urine:2950]    Risk Assessment:   Sensory Perception: 3-->slightly limited  Moisture: 2-->very moist  Activity: 1-->bedfast  Mobility: 2-->very limited  Nutrition: 3-->adequate  Friction and Shear: 1-->problem  Issac Score: 12                          Labs:   Recent Labs   Lab 01/05/22  0640   ALBUMIN 2.0*   HGB 11.1*   WBC 13.0*       Physical Exam  Skin inspection: focused abd folds, perineal, groin, buttock, posterior thighs            Wound Location:  Buttocks, posterior thighs   Date of last photo:  1-5  Wound History: per [facility] charting 2019, patient with bilat buttock unstageable pressure injuries. Per patient spouse, MASD and chronic pressure/friction skin breakdown also noted at home and at TCU prior to admission   Measurements (length x width x depth, in cm):  Generalized buttock and posterior thighs, bilateral buttock/sacrum 15cm x 9cm x 0.2+  Wound Base:  slough  Tunneling: N/A   Undermining: N/A  Palpation of the wound bed: firm   Periwound skin: superficial erosion, with sacrring noted - improvement noted compared to prior consult   Color: pink and purple  Temperature: normal   Drainage: none  Description of drainage: none  Odor: none  Pain: complaint of pain to right leg with turn related to prior fracture, no complaint of pain to skin        Wound Location:  abd folds   Date of last photo:  " 1-5  Wound History: present on admission   Measurements (length x width x depth, in cm):  Generalized to bilt abd fold and groin fold   Wound Base:  Dermis, moisture splits   Tunneling: N/A  Undermining: N/A  Palpation of the wound bed: normal   Periwound skin: rash and superficial erosion  Color: pink and red  Temperature: normal   Drainage: small  Description of drainage: serosanguinous  Odor: mild  Pain: nonverbal indicators absent       Under right breast     Right axilla     Interventions  Current support surface: Bariatric Low air loss mattress  Current off-loading measures: Pillows under calves, Pillows and Wedge positioning system  Visual inspection of wound(s) completed  Wound Care: done per plan of care  Supplies: at bedside, discussed with RN and discussed with patient  Education provided: importance of repositioning, plan of care, wound progress, Infection prevention , Moisture management, Hygiene and Off-loading pressure  Discussed plan of care with Patient and Nurse    Eva BECERRA

## 2022-01-05 NOTE — PROGRESS NOTES
"PULMONOLOGY PROGRESS NOTE    Date of Admission: 12/23/2021    CC/Reason for Hospital visit: acute hypoxemic respiratory failure  SUBJECTIVE      Events reviewed since last seen. Patient on BiPAP overnight. Stable night. No new respiratory problems. Currently on Oxymizer. Breathing appears comfortable at present.    ROS: A Problem Pertinent review of systems was negative except for items noted in HPI.  Past Medical, Family, and Social/Substance History has been reviewed: No interval changes.  OBJECTIVE   Vital signs:  Temp: 97.8  F (36.6  C) Temp src: Axillary BP: (!) 165/82 Pulse: 71   Resp: 20 SpO2: 95 % O2 Device: BiPAP/CPAP Oxygen Delivery: 45 LPM Height: 175.3 cm (5' 9.02\") Weight: 114 kg (251 lb 5.2 oz)  Estimated body mass index is 37.1 kg/m  as calculated from the following:    Height as of this encounter: 1.753 m (5' 9.02\").    Weight as of this encounter: 114 kg (251 lb 5.2 oz).        I/O last 3 completed shifts:  In: 950 [NG/GT:300]  Out: 2825 [Urine:2825]      CONSTITUTIONAL/GENERAL APPEARANCE: Alert female. No Apparent Distress.  PSYCHIATRIC: Pleasant and appropriate mood and affect. Oriented x 3.  EARS, NOSE,THROAT,MOUTH: External ears and nose overall normal. Normal oral mucosa.   NECK: Neck appearance normal. No neck masses and the thyroid is not enlarged.   RESPIRATORY: Non-labored effort. Decreased BS anteriorly.  CARDIOVASCULAR: S1, S2, regular rate and rhythm.      LABORATORY ASSESSMENT    Arterial Blood Gas  Recent Labs   Lab 01/04/22  1227   PH 7.38   PCO2 65*   PO2 87   HCO3 38*   O2PER 60     CBC  Recent Labs   Lab 01/05/22  0640 01/04/22  0610 01/03/22  0800 01/02/22  0729   WBC 13.0* 11.8* 13.5* 12.7*   RBC 5.08 4.95 5.28* 4.99   HGB 11.1* 10.7* 11.5* 10.9*   HCT 41.2 39.7 42.6 41.2   MCV 81 80 81 83   MCH 21.9* 21.6* 21.8* 21.8*   MCHC 26.9* 27.0* 27.0* 26.5*   RDW 21.6* 22.2* 21.8* 21.9*    298 316 268     [facility]  Recent Labs   Lab 01/05/22  0806 01/05/22  0640 01/05/22  0552 " 01/05/22  0430 01/04/22  1216 01/04/22  0610 01/03/22  0853 01/03/22  0800 01/02/22  0833 01/02/22  0729   NA  --  146*  --   --   --  143  --  142  --  146*   POTASSIUM  --  3.2*  --   --   --  4.2  --  3.5  --  3.6   CHLORIDE  --  108  --   --   --  108  --  109  --  113*   MARY  --  8.3*  --   --   --  8.4*  --  8.2*  --  7.8*   CO2  --  39*  --   --   --  31  --  31  --  28   BUN  --  17  --   --   --  19  --  15  --  18   CR  --  0.53  --   --   --  0.62  --  0.56  --  0.57   * 147* 125* 143*   < > 117*   < > 149*   < > 150*    < > = values in this interval not displayed.     INRNo lab results found in last 7 days.   BNPNo lab results found in last 7 days.  VENOUS BLOOD GASESNo lab results found in last 7 days.      Additional labs and/or comments:    IMAGING      CXR today - pending.    CXR 1/3 -  IMPRESSION: New feeding tube courses below the diaphragm. Endotracheal  tube, right IJ CVC, and NG tube have been removed. Cardiomegaly. Mild  infiltrate or atelectasis in the lung bases, mildly improved on the  left and stable on the right. No new findings.    CXR 12/27 -  IMPRESSION: Endotracheal tube tip approximately 6.5 cm from the  ron. Right IJ line stable. Probable increase in left lower lobe  atelectasis and/or infiltrate. Increased groundglass infiltrates at  the right base compared to previous.    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  Patient was  admitted on 12/23/2021 after presenting with altered mental status and found to be in septic shock.  She was intubated on admission and required pressor support.  Patient was extubated 12/30/2021 and transferred to the floor 12/31/2021.  She was treated with Zosyn and changed to ampicillin on 1/3/2021 per ID.  On 1/3/2022 patient developed increasing respiratory distress requiring BiPAP support.  Repeat chest x-ray showed mild infiltrate versus atelectasis at the lung bases, mildly improved on the left and stable on the right.  Patient was felt to have possibly aspirated and has been made NPO.  Respiratory status is about the same today, would continue present rx for now.    PLAN:  1. Adjust oxygen, keep SaO2 > 90%  2. BiPAP prn.  3. Antibiotics - Ampicillin per ID.  4. NPO, elevate HOB, aspiration precautions  5. Await repeat CXR.    Addendum: CXR this AM shows interval near complete opacification of the left  hemithorax possibly related to a large pleural effusion, or a  bronchial mucous plug. Agree with plans for left ultrasound-guided thoracentesis.    Dr. Blanco will be following the patient starting tomorrow.      Adrian Porras M.D.    [facility] [place]  [phone]   (pager)  [phone]   (office)

## 2022-01-05 NOTE — PROGRESS NOTES
Dr. Contreras paged regarding large stone obstructing ramos. Writer instructed to leave ramos out and bladder scan/straightcath as needed to see if the stone was causing incontinence.

## 2022-01-05 NOTE — CONSULTS
[facility] Consultation by [facility]    Amanda Richardson MRN# [id number]   Age: 56 year old YOB: 1965     Date of Admission:  12/23/2021    Reason for consult: Kidney stones       Requesting PA/MD: Dr. Villagomez/Dr. Lee       Level of consult: Consult, follow and place orders           Assessment and Plan:   Assessment:   Nephrolithiasis  Septic shock, bacteremia, respiratory failure      Plan:   No urologic intervention needed. Stones are nonobstructing and there is no hydronephrosis. Images reviewed with Dr. Sapp.    Will arrange definitive stone management with Dr. Sapp once she has recovered from this hospitalization.    Please call if questions.     Lori Sen PA-C  [facility]  [phone]               Chief Complaint:   AMS     History is obtained from the patient and EMR.         History of Present Illness:   Amanda Richardson is a 56 year old female with a past medical history of MS with baclofen pump in place, chronic pain syndrome, diabetes mellitus type II, hypertension, hyperlipidemia, CKD, Non-hodgkin's lymphoma, dehydration, and kidney stones who was admitted on 12/23/21 after presenting to ED with altered mental status and identified to be in septic shock. She was intubated on admission and required pressor support for presumed urinary source. She has since weaned from pressor support as well as ventilator and was extubated 12/30/21.     ID is following. CT notes small bilateral non-obstructing kidney stones. Known to our service for this and did require urgent stenting in 2019 for infected/obstructing stone.On BiPap.               Past Medical History:     Past Medical History:   Diagnosis Date     Abnormality of gait 07/27/2012     Arrhythmia     with sepsis     Chronic pain     [facility] - yearly, next in summer 2018     CKD (chronic kidney disease) stage 1, GFR 90 ml/min or greater     kidney stones     Colon polyps 01/2015    tubular adenomas x 2      Depressive disorder      Gallstone 06/11/2012     Hyperlipidemia LDL goal <70      Hypertension goal BP (blood pressure) < 140/90      Leukocytosis 06/11/2012     Moderate depressive episode (H)      MS (multiple sclerosis) (H) 2003    Dr Vigil/Gaby - [facility]     Multiple sclerosis (H)      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 Cystoscopy 2. LEFT retrograde pyelogram 3. LEFT JJ stent placement 4. <1hr physician fluoroscopy time;  Surgeon: Epifanio Sapp MD;  Location:  OR     COMBINED CYSTOSCOPY, RETROGRADES, URETEROSCOPY, LASER HOLMIUM LITHOTRIPSY URETER(S), INSERT STENT Left 3/15/2019    Procedure: Cystoscopy, left ureteral stent exchange, left retrograde pyelogram, interpretation of fluoroscopic images, left ureteroscopy with holmium lithotripsy and stone basketing, 22 modifier for difficult lengthy case.;  Surgeon: Mayito Chauhan MD;   Location: [facility] OR     CYSTOSCOPY       CYSTOSCOPY, REMOVE STENT(S), COMBINED Left 3/27/2019    Procedure: Flexible cystoscopy with left ureteral stent removal;  Surgeon: Mayito Chauhan MD;  Location: [facility] OR     FUSION LUMBAR ANTERIOR, FUSION LUMBAR POSTERIOR TWO LEVELS, COMBINED  10/17/2013    lumbar fusion - Dr Floyd     INSERT PUMP BACLOFEN  2017    intrathecal baclofen pump implantation     IRRIGATION AND DEBRIDEMENT LOWER EXTREMITY, COMBINED Right 2015    Right ankle I&D d/t infection     OPEN REDUCTION INTERNAL FIXATION ANKLE  5/15    Right Bimalleolar ankle fx ORIF     OPEN REDUCTION INTERNAL FIXATION ANKLE Right 2015    Revision due to spasms pulling screws out of ankle     SINUS SURGERY      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     XR LUMBAR EPIDURAL INJECTION INCL IMAGING  3/14    Left L4-5 Epidural Dr Winter             Social History:     Social History     Tobacco Use     Smoking status: Former Smoker     Packs/day: 0.50     Years: 31.00     Pack years: 15.50     Types: Cigarettes     Quit date: 2013     Years since quittin.4     Smokeless tobacco: Never Used     Tobacco comment: since age 17   Substance Use Topics     Alcohol use: No             Family History:     Family History   Problem Relation Age of Onset     C.A.D. Father         with CHF      Cancer Father         ? unsure type - abdominal      Hypertension Mother      Thyroid Disease Mother         goiter      Breast Cancer Sister 58     Thyroid Disease Son      Cancer Paternal Grandfather      Cancer - colorectal No family hx of      Family history reviewed.             Allergies:     Allergies   Allergen Reactions     Lisinopril Angioedema     Lip swelling     Flexeril [Cyclobenzaprine Hcl] Other (See Comments)     confusion             Medications:     Current Facility-Administered Medications   Medication     amiodarone (PACERONE) tablet 400 mg     ampicillin (OMNIPEN) 2 g vial to attach to   "mL bag     artificial saliva (BIOTENE MT) solution 2 spray     aspirin (ASA) chewable tablet 81 mg     baclofen (LIORESAL) intraTHECAL Internal Pump     baclofen (LIORESAL) tablet 10 mg     dextrose 10% infusion     glucose gel 15-30 g    Or     dextrose 50 % injection 25-50 mL    Or     glucagon injection 1 mg     DULoxetine (CYMBALTA) DR capsule 60 mg     heparin ANTICOAGULANT injection 5,000 Units     [START ON 1/6/2022] influenza recomb quadrivalent PF (FLUBLOK) injection 0.5 mL     insulin aspart (NovoLOG) injection (RAPID ACTING)     lactobacillus rhamnosus (GG) (CULTURELL) capsule 1 capsule     levalbuterol (XOPENEX CONC) neb solution 0.63 mg     lidocaine (LMX4) cream     lidocaine 1 % 0.1-1 mL     loperamide (IMODIUM) capsule 2 mg     [Held by provider] losartan-hydrochlorothiazide (HYZAAR) 50-12.5 MG per tablet 1 tablet     metoprolol tartrate (LOPRESSOR) tablet 25 mg     multivitamins w/minerals liquid 15 mL     naloxone (NARCAN) injection 0.2 mg    Or     naloxone (NARCAN) injection 0.4 mg    Or     naloxone (NARCAN) injection 0.2 mg    Or     naloxone (NARCAN) injection 0.4 mg     nitroGLYcerin (NITROSTAT) sublingual tablet 0.4 mg     oxyCODONE (ROXICODONE) solution 10 mg     pantoprazole (PROTONIX) 2 mg/mL suspension 40 mg     polyethylene glycol (MIRALAX) Packet 17 g     sennosides (SENOKOT) syrup 5 mL     simethicone (MYLICON) chewable tablet 80 mg     sodium chloride (PF) 0.9% PF flush 3 mL     sodium chloride (PF) 0.9% PF flush 3 mL             Review of Systems:   A comprehensive 10-point review of systems was performed and found to be negative except as described in the HPI.     BP (!) 164/75 (BP Location: Left arm)   Pulse 77   Temp 97.5  F (36.4  C) (Axillary)   Resp (!) 31   Ht 1.753 m (5' 9.02\")   Wt 114 kg (251 lb 5.2 oz)   LMP 12/11/2011   SpO2 96%   BMI 37.10 kg/m    PSYCH: NAD, able to talk and answer questions.  : Clear            Data:     Lab Results   Component Value Date    " WBC 13.0 (H) 01/05/2022    HGB 11.1 (L) 01/05/2022    HCT 41.2 01/05/2022    MCV 81 01/05/2022     01/05/2022     Lab Results   Component Value Date    CR 0.53 01/05/2022

## 2022-01-05 NOTE — PROGRESS NOTES
[facility]  Medicine Progress Note - Hospitalist Service       Date of Admission:  12/23/2021    Assessment & Plan      Amanda Richardson is a 56 year old female with a past medical history of MS with baclofen pump in place, chronic pain syndrome, diabetes mellitus type II, hypertension, hyperlipidemia, CKD, Non-hodgkin's lymphoma, dehydration, and Obesity who was admitted on 12/23/21 after presenting to ED with altered mental status and identified to be in septic shock. She was intubated on admission and required pressor support for presumed urinary source. She has since weaned from pressor support as well as ventilator and was extubated 12/30/21. Hospitalist was contacted to assume medical management and transfer from ICU. Transferred to floor 12/31.     Septic shock secondary to  enterococcus bacteremia  Suspected UTI  Renal stones  As above, presented with AMS and findings consistent with septic shock requiring pressor support, mechanical ventilation. WBC 34.7 at presentation, procalcitonin 1.63. CT CAP with bibasilar atelectais/infiltrate, air in left renal collecting system raising suspicion for emphysematous pyelitis, cholelithiasis, bilateral oval densities along pelvic sidewall of unclear significance. Initiated on vanco/zosyn on admit, has been on sole tx with zosyn since 12/27.  *BCx x 2 on admit positive for staph epidermidis, 1/2 positive for enterococcus faecalis on both bottles. Repeat blood cultures negative.  *UCx with 10-50k multiple morphotypes without predominant organism. Note appeared to have been drawn after receiving IV Abx  - started on IV Zosyn (12/23). Given 2/2 staph bacteremia and a device (baclofen pump), ID consulted, felt staph epidermidis present on both sets 1 bottle each is contaminant.   - Given enterococcus as well in blood, abx changed to ampicillin-plan is total 2 weeks of IV antibiotic through 1/7/2022.    - Repeat CT abd pelvis with no abscess/fluid  collection.  -ramos was blocked 1/4 night, flushed and had to be taken out, nursing noted passed stones. Given UTi as well, urology consulted.   - PT/OT consult. Therapy as able.     Acute hypoxic respiratory failure 2/2 sepsis, improved  Left sided pleural effusion vs mucus plugging  Suspected Acute CHF, diastolic  Acute hyercapneic respiratory failure and encephalopathy  Respiratory status was improving after extubation but then started worsening 1/3, CXR showed improment from previous though. Patient needed BiPAP intermittently and subsequently has been dependent on BiPAP mostly.  She was also lethargic while off BiPAP and ABG noted, hypercarbia- PCO2 65, compensated though, mentation has improved after being on BiPAP.   -Patient appeared more dyspneic off BiPAP this am, CXR done this am shows near complete opacification of the left hemithorax possibly related to a large pleural effusion, or a bronchial mucous plug.  -Thoracentesis ordered with lab panel  -If no fluid noted, could be mucous plugging and collapse, pulmonary following, patient may not tolerate bronchoscopy without intubation though.  - Suspect patient has DARWIN as well, recommend sleep study as outpatient.  Meanwhile, continue on BiPAP at least overnight even after her respiratory status improves and as needed during the day if she is taking naps  - ProBNP elevated, diuresed with lasix with good output.  Reassess daily to redose Lasix  - Pulm consulted, appreciate assistance.   - RCAT consult  - SLP evaluated patient and diet was advanced but given worsened respiratory status, on BiPAP, continue to keep NPO, continue tube feeding.     - loose stools improved after colace held. Simethicone and probiotic to continue, Imodium prn. Low concern for c dif, but follow up symptoms. I anticipate stopping tube feeds might slow stools.  - Will consider ramos removal once respiratory status improves, and mobility improves.      Hypernatremia, resolved  Na 153.  Nutrition following, free water flushes have been increased. Improved to 146 today  - Continue free water 120ml q4h  - Monitor BMP    Afib, paroxysmal  Dyspnea/chest tightness  Suspected acute diastolic CHF/fluid overload  Developed afib in setting of above on 12/24 requiring amiodarone infusion, has since been transitioned to PO amiodarone. Has since converted to NSR, EKG on admit with evidence of new inferior infarct, RBBB. Tele on 12/31 concerning for ST-segment elevations. Patient is asymptomatic without cp/sob. Had mild troponin elevation on admission, attributed to septic shock.  - Continue with Tele  - TTE completed- no WMA. LVEF normal. Early diastolic CHF noted. Now diuresing as above.  - Per cardiology: Continue amiodarone for now.  Switch to 200 mg p.o. daily at discharge.  She should get a 14-day Zio patch monitor put on at the time of discharge.  She should see cardiology a month after discharge to follow-up with the results of the Zio patch.  If negative for A. fib recurrence, will discontinue amiodarone.  holding off on initiation of anticoagulation as this was the first episode of A. fib due to other acute medical issues.  However, her AEL2HJ2-ESJl score is 3 [hypertension, gender, diabetes] and hence if A. fib recurs she would require long-term anticoagulation.  -patient is quite hypertensive as well, resumed metoprolol    -reported chest tightness earlier this stay, ongoing for days.  EKG without acute ischemia and serial troponin negative. Likely due to pulm congestion, diuresis as above.    Acute on CKD, resolved  Hypokalemia  Baseline Cr nonelevated. Admission value 3.14, improved with resuscitation.  - Monitor BMP closely now with diuresis.  - Avoid nephrotoxins as able  -Replace potassium per protocol now that renal function has improved.    Diabetes mellitus type II  PTA regimen of metformin. Last Hgb A1c 7.1% on 12/24/21.  - Medium ssi  - Accuchecks QID    Hypertension  Hyperlipidemia  PTA  regimen of losartan-hydrochlorothiazide 50-12.5, metoprolol 50mg daily.  - Resumed PTA metoprolol. Statin eventually once LFTs normalize  - Resumed PTA losartan-hydrochlorothiazide- but with brisk diuresis, holding it. BP in 150s, if continue to trend up, resume.    Right proximal femur fracture, chronic  Has not been repaired 2/2 pandemic per pt/family decision.    Depression  - PTA duloxetine, amitriptyline    Transaminitis  Likely related to septic shock- shock liver, improving.     Rt UE swelling >Lt: US to rule out DVT.  Could be due to edema/IV infiltration.       Diet: Adult Formula Drip Feeding: Continuous Vital High Protein; Nasogastric tube; Goal Rate: 65; mL/hr; Medication - Feeding Tube Flush Frequency: At least 15-30 mL water before and after medication administration and with tube clogging; Amount to Send...  NPO for Medical/Clinical Reasons Except for: Meds    DVT Prophylaxis: SQH  Bobo Catheter: PRESENT, indication: Retention, Strict 1-2 Hour I&O, Neurogenic Bladder  Central Lines: None  Code Status: No CPR- Pre-arrest intubation OK      Disposition Plan   Expected Discharge: 01/08/2022     Anticipated discharge location:  Awaiting care coordination huddle  Delays:     Oxygen Needs  PT Consult            The patient's care was discussed with the Bedside Nurse and Patient and her  over the phone 1/5    Cody Lee MD  Hospitalist Service  [employer]  Securely message with the Vocera Web Console (learn more here)  Text page via [employer] Paging/Directory        Clinically Significant Risk Factors Present on Admission                    ______________________________________________________________________    Interval History     Patient remained on BiPAP overnight, tolerated well.  BiPAP was discontinued this morning and was transitioned to 14 LPM oxygen mask.  I evaluated patient shortly after she was placed on oxygen mask.  She seemed dyspneic but reported overall  breathing has improved.  Denies chest pain.  Remains alert awake.  Afebrile.  -Noted good urine output of about 2.8L with 1 dose of Lasix yesterday morning.  -Repeat dose 40 mg IV ordered this a.m. and has put out close to 2L so far.   -Chest x-ray was ordered which showed near complete opacification of the left side.    Data reviewed today: I reviewed all medications, new labs  results over the last 24 hours. Tele sinus rhythm. CXR as above.    Physical Exam   Vital Signs: Temp: 97.5  F (36.4  C) Temp src: Axillary BP: (!) 164/75 Pulse: 70   Resp: 23 SpO2: 96 % O2 Device: BiPAP/CPAP Oxygen Delivery: 45 LPM  Weight: 251 lbs 5.19 oz     Constitutional: Awake, alert, cooperative, very pleasant, on oxymask-comfortable  HEENT: PERRLA EOMI, deniz in place.  Respiratory: breath sounds are diminished more so on Lt, crackles bilaterally, occ wheezing present   Cardiovascular:   S1 and S2 regular, no tachycardia, and no murmur noted  GI: soft, obese, non tender.   Skin/Integumen: No rashes, no cyanosis.   Ext: Chronic skin changes or stasis dermatitis present. Both legs wrapped now. Upper extremities are edematous too, Rt more than Lt but had IV in Lt.  Neuro: CN 2-12 intact, non focal exam      Data   Recent Labs   Lab 01/05/22  1205 01/05/22  1203 01/05/22  0806 01/05/22  0640 01/04/22  1216 01/04/22  0610 01/03/22  0853 01/03/22  0800   WBC  --   --   --  13.0*  --  11.8*  --  13.5*   HGB  --   --   --  11.1*  --  10.7*  --  11.5*   MCV  --   --   --  81  --  80  --  81   PLT  --   --   --  302  --  298  --  316   NA  --   --   --  146*  --  143  --  142   POTASSIUM  --   --   --  3.2*  --  4.2  --  3.5   CHLORIDE  --   --   --  108  --  108  --  109   CO2  --   --   --  39*  --  31  --  31   BUN  --   --   --  17  --  19  --  15   CR  --   --   --  0.53  --  0.62  --  0.56   ANIONGAP  --   --   --  <1*  --  4  --  2*   MARY  --   --   --  8.3*  --  8.4*  --  8.2*   *  --  136* 147*   < > 117*   < > 149*   ALBUMIN  --    --   --  2.0*  --  1.9*  --  2.1*   PROTTOTAL  --  7.9  --  7.9  --  7.8  --  8.2   BILITOTAL  --   --   --  0.5  --  0.5  --  0.7   ALKPHOS  --   --   --  130  --  135  --  147   ALT  --   --   --  109*  --  129*  --  162*   AST  --   --   --  32  --  59*  --  39    < > = values in this interval not displayed.     Recent Results (from the past 24 hour(s))   XR Chest Port 1 View    Narrative    XR CHEST PORT 1 VIEW 1/5/2022 10:41 AM    HISTORY: Follow up -pneumonia vs edema    COMPARISON: 1/3/2022      Impression    IMPRESSION: Interval near complete opacification of the left  hemithorax possibly related to a large pleural effusion, or a  bronchial mucous plug. The right lung is clear. Feeding tube coursing  below left hemidiaphragm.    ELLI EVANS MD         SYSTEM ID:  [id number]     Medications     baclofen (LIORESAL) intraTHECAL Internal Pump       dextrose         amiodarone  400 mg Oral or Feeding Tube Daily     ampicillin  2 g Intravenous Q6H     artificial saliva  2 spray Swish & Spit 4x Daily     aspirin  81 mg Oral or Feeding Tube Daily     DULoxetine  60 mg Oral BID     heparin ANTICOAGULANT  5,000 Units Subcutaneous Q8H     [START ON 1/6/2022] influenza recomb quadrivalent PF  0.5 mL Intramuscular Prior to discharge     insulin aspart  1-6 Units Subcutaneous Q4H     lactobacillus rhamnosus (GG)  1 capsule Oral BID     [Held by provider] losartan-hydrochlorothiazide  1 tablet Oral Daily     metoprolol tartrate  25 mg Oral or Feeding Tube BID     multivitamins w/minerals  15 mL Per Feeding Tube Daily     pantoprazole  40 mg Oral or Feeding Tube QAM AC     polyethylene glycol  17 g Oral or Feeding Tube Daily     potassium chloride  20 mEq Oral or Feeding Tube BID     sennosides  5 mL Oral or Feeding Tube BID     sodium chloride (PF)  3 mL Intracatheter Q8H

## 2022-01-05 NOTE — PROGRESS NOTES
[facility]    Infectious Disease Progress Note    Date of Service : 01/05/2022     Assessment:  1. E.fecalis sepsis of urinary source with septic shock /multiorgan dysfunction -respiratoy failure, JULIANNE, elevated LFTs and required ventilation/ pressor support- all improved. Echocardiogram without evidence of endocarditis, blood cxs cleared rapidly which also makes endocarditis less ikely.  2. 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 Has bilateral renal calculi which will need further urology evaluation.  3. S.epidermidis bacteremia likely due to culture contamination. Polymicrobial bacteremia seems unlikely without indwelling cental line etc. Pump site does not appear infected, there is no overlying erythema and CT abdomen twice did not show stranding or fluid collection around pump site.  4. Transminitis related to septic shock -improving  5. JULIANNE resolved  6. Atrial fibrillation, on amiodarone and in sinus rhythm  7. Densities along the pelvic sidewall, unclear whether chronic fluid collection/seroma. or adenopathy. In view of sepsis, wound recommend re evaluation with imaging .  8. Cholelithiasis  9. Chronic medical conditions - DM, HTN, hyperlipidemia     Recommendations:  1. Continue Ampicillin for E.fecalis bacteremia. Complete treatment for a total of 2 weeks from negative blood cxs (12/25 ) until 1/7/2022  2. S.epi is a contaminant and does not require further treatment -has been treated for 10 days already  3. No obvious sign of pump infection at this time, will need to be monitored when off antibiotics and if has recurrent signs of infection then will need to cosider pump removal  4. Plan repeating blood cxs 5-7 days after antibiotic discontinuation  5. Urology evaluation for renal calculi    Discussed with the Hospitalist service  Hazel Villagomez MD    Interval History   Passed stone through ramos  catheter, slight leukocytosis today, remained afebrile    Antimicrobial therapy  1/3 Ampicillin  prior  12/24-1/3/22 Zosyn  12/24-12/26 Vancomycin  Physical Exam   Temp: 97.8  F (36.6  C) Temp src: Axillary BP: (!) 165/82 Pulse: 71   Resp: 20 SpO2: 93 % O2 Device: Oxymask Oxygen Delivery: 12 LPM  Vitals:    12/31/21 0200 01/02/22 0500 01/05/22 0530   Weight: 124.3 kg (274 lb) 123.8 kg (273 lb) 114 kg (251 lb 5.2 oz)     Vital Signs with Ranges  Temp:  [97.8  F (36.6  C)-98.4  F (36.9  C)] 97.8  F (36.6  C)  Pulse:  [64-80] 71  Resp:  [16-27] 20  BP: (129-165)/(62-82) 165/82  FiO2 (%):  [60 %-70 %] 60 %  SpO2:  [90 %-99 %] 93 %    GENERAL APPEARANCE:  Awake, has BiPAP mask on  EYES: Eyes grossly normal to inspection  NECK: no adenopathy  RESP: lungs clear   CV: regular rates and rhythm  ABDOMEN: soft, some discomfort over pump site but no erythema or induration  MS: chronic stasis changes, skin thickening and discoloration    Other:    Medications     baclofen (LIORESAL) intraTHECAL Internal Pump       dextrose         amiodarone  400 mg Oral or Feeding Tube Daily     ampicillin  2 g Intravenous Q6H     artificial saliva  2 spray Swish & Spit 4x Daily     aspirin  81 mg Oral or Feeding Tube Daily     DULoxetine  60 mg Oral BID     heparin ANTICOAGULANT  5,000 Units Subcutaneous Q8H     [START ON 1/6/2022] influenza recomb quadrivalent PF  0.5 mL Intramuscular Prior to discharge     insulin aspart  1-6 Units Subcutaneous Q4H     lactobacillus rhamnosus (GG)  1 capsule Oral BID     [Held by provider] losartan-hydrochlorothiazide  1 tablet Oral Daily     metoprolol tartrate  25 mg Oral or Feeding Tube BID     multivitamins w/minerals  15 mL Per Feeding Tube Daily     pantoprazole  40 mg Oral or Feeding Tube QAM AC     polyethylene glycol  17 g Oral or Feeding Tube Daily     potassium chloride  20 mEq Oral or Feeding Tube BID     sennosides  5 mL Oral or Feeding Tube BID     sodium chloride (PF)  3 mL Intracatheter Q8H        Data   All microbiology laboratory data reviewed.  Recent Labs   Lab Test 01/05/22  0640 01/04/22  0610 01/03/22  0800   WBC 13.0* 11.8* 13.5*   HGB 11.1* 10.7* 11.5*   HCT 41.2 39.7 42.6   MCV 81 80 81    298 316     Recent Labs   Lab Test 01/05/22  0640 01/04/22  0610 01/03/22  0800   CR 0.53 0.62 0.56     Recent Labs   Lab Test 12/23/21  2121   SED 14     Microbiology  12/23 blood cxs 1 set 2/2 bottles E.fecalis and both sets 2/4 bottles S.epidermidis, 12/25 blood cxs negative        Culture Positive on the 1st day of incubation Abnormal         Enterococcus faecalis Panic     2 of 2 bottles   Staphylococcus epidermidis Panic     1 of 2 bottles         Resulting Agency: [facility]         Susceptibility                       Enterococcus faecalis Staphylococcus epidermidis       SUSY SUSY       Ampicillin <=2.0 ug/mL Susceptible           Ciprofloxacin     2.0 ug/mL Intermediate       Clindamycin     >=8.0 ug/mL Resistant       Erythromycin     >=8.0 ug/mL Resistant       Gentamicin     8.0 ug/mL Intermediate       Gentamicin Synergy Susceptible... Susceptible 1           Levofloxacin     4.0 ug/mL Resistant       Oxacillin     <=0.25 ug/mL Susceptible 2       Penicillin 2.0 ug/mL Susceptible           Tetracycline     <=1.0 ug/mL Susceptible       Vancomycin 1.0 ug/mL Susceptible 2.0 ug/mL Susceptible                    Imaging  1/3/2022  EXAM: CT ABDOMEN PELVIS W CONTRAST  LOCATION: [facility]  DATE/TIME: 1/3/2022 5:02 PM     INDICATION: Abdominal abscess/infection suspected.  COMPARISON: None.  TECHNIQUE: CT scan of the abdomen and pelvis was performed following injection of IV contrast. Multiplanar reformats were obtained. Dose reduction techniques were used.  CONTRAST: 135 mL Isovue-370.     FINDINGS:   LOWER CHEST: Bibasilar consolidation with tiny amount of pleural fluid. Cardiac enlargement.     HEPATOBILIARY: Calcified gallstones.     PANCREAS: Normal.     SPLEEN:  Normal.     ADRENAL GLANDS: Stable mild nodular enlargement of the left adrenal gland.     KIDNEYS/BLADDER: Stable nonobstructing renal stones.     BOWEL: Nasogastric tube has been removed and replaced with a feeding tube with tip at the gastroduodenal junction.     LYMPH NODES: Normal.     VASCULATURE: Unremarkable.     PELVIC ORGANS: Lipid rich fibroid. Anasarca with ill-defined fluid in the subcutaneous tissues. Bobo catheter in the urinary bladder. Perirectal soft tissue haziness is stable. Bilateral sidewall lymphadenopathy unchanged.     MUSCULOSKELETAL: Surgical changes in the lumbar spine.                                                                      IMPRESSION:   1.  No significant change. There is stable pelvic sidewall lymphadenopathy.     2.  Bibasilar pulmonary consolidation with trace pleural effusions.     3.  Nonobstructing renal stones.     4.  Nasogastric tube has been removed and replaced with a feeding tube with tip at the duodenojejunal junction.     5.  Postsurgical changes in the lumbar spine. Chronic displaced fracture of the right femoral neck.     6.  Cholelithiasis.     7.  Lipid rich fibroid in the uterus.      12/23 Ct chest abdomen pelvis  EXAM: CT CHEST ABDOMEN PELVIS W/O CONTRAST  LOCATION: [facility]  DATE/TIME: 12/23/2021 10:21 PM     INDICATION: Sepsis  COMPARISON: 03/16/2019  TECHNIQUE: CT scan of the chest, abdomen, and pelvis was performed without IV contrast. Multiplanar reformats were obtained. Dose reduction techniques were used.   CONTRAST: None.     FINDINGS:   LUNGS AND PLEURA: Mosaic attenuation. Bibasilar atelectasis or infiltrate. Trace pleural effusions.     MEDIASTINUM/AXILLAE: No adenopathy or pericardial effusion. Endotracheal and enteric tube.     CORONARY ARTERY CALCIFICATION: Mild-to-moderate.     HEPATOBILIARY: Cholelithiasis.     PANCREAS: Normal.     SPLEEN: Normal.     ADRENAL GLANDS: Thickening of the adrenal  glands.     KIDNEYS/BLADDER: Multiple, bilateral renal calculi. Larger on the left 7 mm. Small amount of air in the left renal collecting system. Bobo within the bladder.     BOWEL: Normal caliber.     LYMPH NODES: Bilateral hypodensity along the pelvic sidewall. On the right 4.7 x 2.3 cm series 4 image 256 and on the left 3.9 x 1.7 cm image 262.     VASCULATURE: Atherosclerotic vascular calcification.     PELVIC ORGANS: 1.6 cm fatty lesion within the uterus.     MUSCULOSKELETAL: Postsurgical change lumbar spine. Chronic fracture right proximal femur. Degenerative change osseous structures. Implanted device right ventral abdomen.                                                                      IMPRESSION:  1.  Bibasilar atelectasis or infiltrate and trace pleural effusions.  2.  Small amount of air in the left renal collecting system. Was there recent instrumentation? Correlate for emphysematous pyelitis.  3.  Cholelithiasis. Gallbladder wall thickening not excluded.  4.  Trace amount of free fluid.  5.  Bilateral renal calculi.  6.  1.6 cm fatty lesion within the uterus.  7.  Bilateral oval densities along the pelvic sidewall. Uncertain if these are chronic fluid collection/seroma. Possibility of adenopathy not excluded. Follow-up recommended.     12/23 TTE  Interpretation Summary     1. Normal left ventricular size and function. Left ventricular ejection  fraction of 60-65%. No segmental wall motion abnormalities noted.  2. The right ventricle is mildly dilated. The right ventricular systolic  function is normal.  3. Valves not well assessed on this technically difficult study.  Compared to the previous echocardiogram on 2/1/2019, there are no significant  changes. Technically very difficult study.

## 2022-01-05 NOTE — PROGRESS NOTES
CPAP/BiPAP Settings  IPAP: 14  EPAP: 7  Rate: 12  FiO2: 60  Timed Inspiration (sec): 9 hours    Patient transferred from 8th floor to [facility] during the night for continuous BiPAP. Patient taken off BiPAP around 0800 tihs morning but, after a few hours, became increasingly lethargic. RN placed patient back on BIPAP and remains on above settings at this time.

## 2022-01-05 NOTE — PROVIDER NOTIFICATION
Spoke with Jesus about POC. U/S for RUE -DVT? CXR as well. K+ follow protocol plus two additional doses he ordered. Miconazole powder for folds. VBG's to be taken ASAP.

## 2022-01-05 NOTE — PROCEDURES
[facility]    Procedure: IR Procedure Note    Date/Time: 1/5/2022 2:45 PM  Performed by: Asia Larsen MD  Authorized by: Asia Larsen MD       UNIVERSAL PROTOCOL   Site Marked: NA  Prior Images Obtained and Reviewed:  Yes  Required items: Required blood products, implants, devices and special equipment available    Patient identity confirmed:  Verbally with patient, arm band, provided demographic data and hospital-assigned identification number  Patient was reevaluated immediately before administering moderate or deep sedation or anesthesia  Confirmation Checklist:  Patient's identity using two indicators, relevant allergies, procedure was appropriate and matched the consent or emergent situation and correct equipment/implants were available  Time out: Immediately prior to the procedure a time out was called    Universal Protocol: the Joint Commission Universal Protocol was followed    Preparation: Patient was prepped and draped in usual sterile fashion       ANESTHESIA    Anesthesia: Local infiltration  Local Anesthetic:  Lidocaine 1% without epinephrine      SEDATION    Patient Sedated: No    See dictated procedure note for full details.  Findings:   Left pleural effusion    Specimens: none    Complications: None    Condition: Stable      PROCEDURE    Patient Tolerance:  Patient tolerated the procedure well with no immediate complications  Length of time physician/provider present for 1:1 monitoring during sedation: 0

## 2022-01-06 NOTE — PROGRESS NOTES
[facility]  Vascular Medicine Progress Note          Assessment and Plan:          1.  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     -The patient is symptomatic from the above.  One treatment approach would simply be to remove the offending catheters and undertakes serial imaging.  However, I would not suggest this in the current patient given the fact that she is having symptomatic right upper extremity swelling and edema.      -She is also having shortness of breath.  The likelihood of upper extremity embolization to the lungs is extremely low.  I would simply, for the time being, recommend that she be anticoagulated therapeutically.  I would not presently recommended obtaining a PE protocol CT of the chest for reasons as delineated above.      -She has pleural effusions. Will possibly be having repeat thoracentesis.    -Eventually, when all procedures are done, she can be transitioned to therapeutic oral anticoagulation in the form of a DOAC.  She is morbidly obese, but is not at a weight that exceeds the upper limit of recommended utilization for DOACs.      -Turn off IV UFH gtt per radiology protocol prior to repeat thoracentesis if this is undertaken.     -No hypercoagulable workup is warranted as these are catheter-associated thrombotic events.      -If able to tolerate anticoagulation, I would recommend a minimum of 12 weeks of anticoagulation.  We will see the patient in outpatient followup to determine ultimate timing of cessation of anticoagulation.           2.  Poor venous access.       The patient will likely require a PICC line in her contralateral left upper extremity.  I would like to avoid this given her propensity to performing catheter-associated thrombosis.  However, the patient does require staple venous access.  As such, a PICC line may be needed.  I will defer to the Hospitalist Team pertaining to this.                   Interval History:   doing well; no cp, n/v/d, or abd pain. and positive SOB except when on BiPAP              Review of Systems:   The 10 point Review of Systems is negative other than noted in the HPI             Medications:       amiodarone  400 mg Oral or Feeding Tube Daily     ampicillin  2 g Intravenous Q6H     artificial saliva  2 spray Swish & Spit 4x Daily     aspirin  81 mg Oral or Feeding Tube Daily     DULoxetine  60 mg Oral BID     influenza recomb quadrivalent PF  0.5 mL Intramuscular Prior to discharge     insulin aspart  1-6 Units Subcutaneous Q4H     lactobacillus rhamnosus (GG)  1 capsule Oral BID     [Held by provider] losartan-hydrochlorothiazide  1 tablet Oral Daily     metoprolol tartrate  25 mg Oral or Feeding Tube BID     multivitamins w/minerals  15 mL Per Feeding Tube Daily     pantoprazole  40 mg Oral or Feeding Tube QAM AC     polyethylene glycol  17 g Oral or Feeding Tube Daily     sennosides  5 mL Oral or Feeding Tube BID     sodium chloride (PF)  3 mL Intracatheter Q8H                  Physical Exam:     Vitals were reviewed  Patient Vitals for the past 24 hrs:   BP Temp Temp src Pulse Resp SpO2 Weight   01/06/22 0800 (!) 150/101 -- -- 84 26 94 % --   01/06/22 0750 -- 97.7  F (36.5  C) Axillary -- -- -- --   01/06/22 0600 (!) 158/81 -- -- 80 23 94 % --   01/06/22 0558 -- -- -- -- -- -- 113 kg (249 lb 1.9 oz)   01/06/22 0400 (!) 149/85 -- -- 79 24 94 % --   01/06/22 0200 (!) 159/80 -- -- 80 23 94 % --   01/06/22 0000 (!) 158/78 -- -- 73 -- 92 % --   01/05/22 2321 -- -- -- -- -- 90 % --   01/05/22 2320 -- -- -- -- -- (!) 86 % --   01/05/22 2310 -- -- -- -- (!) 31 -- --   01/05/22 2300 -- 97.7  F (36.5  C) -- -- -- -- --   01/05/22 2200 (!) 156/90 -- -- 75 -- 91 % --   01/05/22 2107 (!) 170/77 -- -- 78 -- -- --   01/05/22 2036 -- -- -- -- -- 90 % --   01/05/22 2035 -- -- -- -- -- (!) 89 % --   01/05/22 2003 -- 97.7  F (36.5  C) Axillary -- -- -- --   01/05/22 2000 (!)  172/90 -- -- -- -- 90 % --   01/05/22 1900 -- -- -- -- -- 90 % --   01/05/22 1620 -- 97.5  F (36.4  C) Axillary -- -- -- --   01/05/22 1600 (!) 155/72 -- -- 86 17 92 % --   01/05/22 1400 (!) 151/68 -- -- 84 17 94 % --   01/05/22 1300 -- -- -- 77 (!) 31 96 % --   01/05/22 1207 -- 97.5  F (36.4  C) Axillary -- -- -- --   01/05/22 1200 (!) 164/75 -- -- 70 23 96 % --   01/05/22 1115 -- -- -- -- -- 95 % --   01/05/22 1100 -- -- -- -- -- 92 % --     Wt Readings from Last 4 Encounters:   01/06/22 113 kg (249 lb 1.9 oz)   01/29/20 114.3 kg (252 lb)   07/29/19 114.3 kg (252 lb)   07/17/19 114.3 kg (252 lb)       Intake/Output Summary (Last 24 hours) at 1/6/2022 1033  Last data filed at 1/6/2022 0600  Gross per 24 hour   Intake 2860 ml   Output 2950 ml   Net -90 ml     Constitutional: normal  Eyes: normal  ENT: normal  Neck: Supple, symmetrical, trachea midline, no adenopathy, thyroid symmetric, not enlarged and no tenderness, skin normal.  Hematologic / Lymphatic: normal  Back: normal  Lungs: decreased BS anteriorly  Cardiovascular: Regular rate and rhythm, normal S1 and S2, no S3 or S4, and no murmur noted.  Abdomen: normal  Musculoskeletal: RUE w/ significant enlargement compared to left; Not tense or taut  Neurologic: normal  Neuropsychiatric: normal  Skin: normal           Data:     Results for orders placed or performed during the hospital encounter of 12/23/21 (from the past 24 hour(s))   XR Chest Port 1 View    Narrative    XR CHEST PORT 1 VIEW 1/5/2022 10:41 AM    HISTORY: Follow up -pneumonia vs edema    COMPARISON: 1/3/2022      Impression    IMPRESSION: Interval near complete opacification of the left  hemithorax possibly related to a large pleural effusion, or a  bronchial mucous plug. The right lung is clear. Feeding tube coursing  below left hemidiaphragm.    ELLI EVANS MD         SYSTEM ID:  [id number]   Lactate Dehydrogenase   Result Value Ref Range    Lactate Dehydrogenase 223 81 - 234 U/L   Protein total    Result Value Ref Range    Protein Total 7.9 6.8 - 8.8 g/dL   Glucose by meter   Result Value Ref Range    GLUCOSE BY METER POCT 141 (H) 70 - 99 mg/dL   Cell count with differential fluid    Narrative    The following orders were created for panel order Cell count with differential fluid.  Procedure                               Abnormality         Status                     ---------                               -----------         ------                     Cell Count Body Fluid[[id number]]        Abnormal            Final result               Differential Body Fluid[[id number]]                          Final result                 Please view results for these tests on the individual orders.   Pleural fluid Aerobic Bacterial Culture Routine with Gram Stain    Specimen: Pleural Cavity, Left; Pleural fluid   Result Value Ref Range    Gram Stain Result No organisms seen     Gram Stain Result 2+ WBC seen    Glucose fluid   Result Value Ref Range    Glucose fluid 160 mg/dL    Narrative    No reference ranges have been established.  This result should be interpreted in the context of the patient's clinical condition and compared to simultaneous measurement in the patient's blood.  This is a lab developed test. It has not been cleared or approved by the FDA.   Lactate dehydrogenase fluid   Result Value Ref Range    Lactate dehydrogenase fluid 79 U/L    Narrative    No reference ranges have been established.  This result should be interpreted in the context of the patient's clinical condition and compared to simultaneous measurement in the patient's blood.   Protein fluid   Result Value Ref Range    Protein Total Fluid 1.2 g/dL    Narrative    No reference ranges have been established.  This result should be interpreted in the context of the patient's clinical condition and compared to simultaneous measurement in the patient's blood.  This is a lab developed test. It has not been cleared or approved by the FDA.   Cell  Count Body Fluid   Result Value Ref Range    Color Yellow Colorless, Yellow    Clarity Hazy (A) Clear, Bloody    Total Nucleated Cells 208 /uL    Narrative    No reference ranges have been established.  This result  should be interpreted in the context of the patient's clinical condition and   compared to simultaneous measurement in the patient's blood.        Small clot present, count may be inaccurate.   Differential Body Fluid   Result Value Ref Range    % Neutrophils 60 %    % Lymphocytes 29 %    % Monocyte/Macrophages 9 %    % Eosinophils 1 %    % Lining Cells 1 %    Narrative    No reference ranges have been established. This result should be interpreted in the context of the patient's clinical condition and compared to simultaneous measurement in the patient's blood.   US Thoracentesis    Narrative    US THORACENTESIS 1/5/2022 2:21 PM    CLINICAL HISTORY: left pleural effusion, respiratory failure    PROCEDURE: Informed consent obtained. Time out performed. The chest  was prepped and draped in sterile fashion. 10 mL of 1% Xylocaine was  infused into the local soft tissues. Under direct ultrasound guidance,  a 5 Samoan catheter system was placed into the pleural effusion.     500 mL of yellow fluid were removed and sent to lab, if requested.    Patient tolerated procedure well.    Ultrasound imaging was obtained and placed in the patient's permanent  medical record.      Impression    IMPRESSION:  1.  Status post left ultrasound-guided thoracentesis.    Reference CPT Code: 44527    ELLI EVNAS MD         SYSTEM ID:  [id number]   US Upper Extremity Venous Duplex Right    Narrative    ULTRASOUND RIGHT UPPER EXTREMITY VENOUS DUPLEX  1/5/2022 2:32 PM    CLINICAL HISTORY/INDICATION:  Arm swelling, edema vs DVT.    COMPARISON:  None relevant    TECHNIQUE: Grayscale, color-flow, and spectral waveform analysis were  performed of the deep veins of the right upper extremity    FINDINGS: Small focal nonocclusive deep  vein thrombosis in the right  internal jugular vein. Superficial thrombus noted in the cephalic vein  throughout the upper arm and proximal forearm.    The right subclavian vein, axillary vein, brachial vein and basilic  vein demonstrate normal compressibility, spectral waveform, color flow  and augmentation.      Impression    IMPRESSION:   1. Focal short segment nonocclusive deep vein thrombosis in the right  internal jugular vein.  2. Superficial thrombus in the cephalic vein from the mid forearm to  the proximal upper arm.    KAVON GELLER DO         SYSTEM ID:  [id number]   IR Procedure Note    Narrative    Asia Larsen MD     1/5/2022  4:41 PM  [facility]    Procedure: IR Procedure Note    Date/Time: 1/5/2022 2:45 PM  Performed by: Asia Larsen MD  Authorized by: Asia Larsen MD       UNIVERSAL PROTOCOL   Site Marked: NA  Prior Images Obtained and Reviewed:  Yes  Required items: Required blood products, implants, devices and special   equipment available    Patient identity confirmed:  Verbally with patient, arm band, provided   demographic data and hospital-assigned identification number  Patient was reevaluated immediately before administering moderate or deep   sedation or anesthesia  Confirmation Checklist:  Patient's identity using two indicators, relevant   allergies, procedure was appropriate and matched the consent or emergent   situation and correct equipment/implants were available  Time out: Immediately prior to the procedure a time out was called    Universal Protocol: the Joint Commission Universal Protocol was followed    Preparation: Patient was prepped and draped in usual sterile fashion       ANESTHESIA    Anesthesia: Local infiltration  Local Anesthetic:  Lidocaine 1% without epinephrine      SEDATION    Patient Sedated: No    See dictated procedure note for full details.  Findings:   Left pleural effusion    Specimens: none    Complications:  None    Condition: Stable      PROCEDURE    Patient Tolerance:  Patient tolerated the procedure well with no immediate   complications  Length of time physician/provider present for 1:1 monitoring during   sedation: 0   Blood gas venous with oxyhemoglobin   Result Value Ref Range    pH Venous 7.36 7.32 - 7.43    pCO2 Venous 78 (HH) 40 - 50 mm Hg    pO2 Venous 39 25 - 47 mm Hg    Bicarbonate Venous 44 (H) 21 - 28 mmol/L    FIO2 60     Oxyhemoglobin Venous 68 (L) 70 - 75 %    Base Excess/Deficit (+/-) 15.3 (H) -7.7 - 1.9 mmol/L   Glucose by meter   Result Value Ref Range    GLUCOSE BY METER POCT 103 (H) 70 - 99 mg/dL   Glucose by meter   Result Value Ref Range    GLUCOSE BY METER POCT 135 (H) 70 - 99 mg/dL   Heparin Unfractionated Anti Xa Level   Result Value Ref Range    Anti Xa Unfractionated Heparin 0.33 For Reference Range, See Comment IU/mL    Narrative    Therapeutic Range: UFH: 0.25-0.50 IU/mL for low intensity dosing,  0.30-0.70 IU/mL for high intensity dosing DVT and PE.  This test is not validated for other direct factor X inhibitors (e.g. rivaroxaban, apixaban, edoxaban, betrixaban, fondaparinux) and should not be used for monitoring of other medications.   Potassium   Result Value Ref Range    Potassium 3.7 3.4 - 5.3 mmol/L   Nt probnp inpatient   Result Value Ref Range    N terminal Pro BNP Inpatient 2,928 (H) 0 - 900 pg/mL   Glucose by meter   Result Value Ref Range    GLUCOSE BY METER POCT 149 (H) 70 - 99 mg/dL   XR Chest Port 1 View    Narrative    EXAM: XR CHEST PORT 1 VIEW  LOCATION: [facility]  DATE/TIME: 1/6/2022 12:14 AM    INDICATION: Increasing O2 demand.  COMPARISON: 1/5/2022.    FINDINGS: Nasoenteric feeding tube passes into the stomach. The heart is enlarged. There is no pulmonary edema. Left pleural effusion has decreased in the interval. There are left upper lobe and left basilar consolidations. Mild infiltrate in the right   upper lobe inferiorly. Atelectasis or  scar at the right lung base.      Impression    IMPRESSION: Bilateral pulmonary infiltrates, left greater than right. Decreasing left pleural effusion.   Glucose by meter   Result Value Ref Range    GLUCOSE BY METER POCT 142 (H) 70 - 99 mg/dL   Comprehensive metabolic panel   Result Value Ref Range    Sodium 144 133 - 144 mmol/L    Potassium 3.8 3.4 - 5.3 mmol/L    Chloride 105 94 - 109 mmol/L    Carbon Dioxide (CO2) 38 (H) 20 - 32 mmol/L    Anion Gap 1 (L) 3 - 14 mmol/L    Urea Nitrogen 18 7 - 30 mg/dL    Creatinine 0.48 (L) 0.52 - 1.04 mg/dL    Calcium 8.5 8.5 - 10.1 mg/dL    Glucose 146 (H) 70 - 99 mg/dL    Alkaline Phosphatase 135 40 - 150 U/L    AST 28 0 - 45 U/L    ALT 93 (H) 0 - 50 U/L    Protein Total 8.4 6.8 - 8.8 g/dL    Albumin 2.1 (L) 3.4 - 5.0 g/dL    Bilirubin Total 0.5 0.2 - 1.3 mg/dL    GFR Estimate >90 >60 mL/min/1.73m2   CBC with platelets   Result Value Ref Range    WBC Count 16.3 (H) 4.0 - 11.0 10e3/uL    RBC Count 5.19 3.80 - 5.20 10e6/uL    Hemoglobin 11.3 (L) 11.7 - 15.7 g/dL    Hematocrit 41.6 35.0 - 47.0 %    MCV 80 78 - 100 fL    MCH 21.8 (L) 26.5 - 33.0 pg    MCHC 27.2 (L) 31.5 - 36.5 g/dL    RDW 22.5 (H) 10.0 - 15.0 %    Platelet Count 337 150 - 450 10e3/uL   Heparin Unfractionated Anti Xa Level   Result Value Ref Range    Anti Xa Unfractionated Heparin 0.20 For Reference Range, See Comment IU/mL    Narrative    Therapeutic Range: UFH: 0.25-0.50 IU/mL for low intensity dosing,  0.30-0.70 IU/mL for high intensity dosing DVT and PE.  This test is not validated for other direct factor X inhibitors (e.g. rivaroxaban, apixaban, edoxaban, betrixaban, fondaparinux) and should not be used for monitoring of other medications.   Glucose by meter   Result Value Ref Range    GLUCOSE BY METER POCT 155 (H) 70 - 99 mg/dL

## 2022-01-06 NOTE — PROVIDER NOTIFICATION
MD Notification    Notified Person: Dr. Van    Notification Date/Time: 1/6/2022 12:08 PM     Notification Interaction: Web based    Purpose of Notification: Patient oriented but lethargic again, even with BiPAP present. Settings now @ 90% FiO2. Could you round on her sooner rather than later please? Thanks!    Orders Received: STAT ABGs, paged respiratory, will call house NP if further decline noted.    Comments: NA        1417:  Critical PCO2 is 89 from ABGs. Dr. Van repaged.

## 2022-01-06 NOTE — CONSULTS
Care Management Initial Consult    General Information  Assessment completed with: Spouse or significant other,  (Fernando)  Type of CM/SW Visit: Initial Assessment    Primary Care Provider verified and updated as needed: Yes   Readmission within the last 30 days: no previous admission in last 30 days      Reason for Consult: discharge planning  Advance Care Planning: Advance Care Planning Reviewed: present on chart          Communication Assessment  Patient's communication style: spoken language (English or Bilingual)    Hearing Difficulty or Deaf: no   Wear Glasses or Blind: no    Cognitive  Cognitive/Neuro/Behavioral: .WDL except  Level of Consciousness: lethargic  Arousal Level: arouses to voice  Orientation: oriented x 4  Mood/Behavior: cooperative  Best Language: 1 - Mild to moderate  Speech: slow,whispers    Living Environment:   People in home: child(heidi), adult,spouse   (Fernando Patel, Ramy)  Current living Arrangements: house, split level      Able to return to prior arrangements: yes  Living Arrangement Comments:  (TCU is needed first)    Family/Social Support:  Care provided by: self,spouse/significant other,child(heidi)  Provides care for: no one  Marital Status:   ,Children   (Fernando)       Description of Support System: Supportive,Involved    Support Assessment: Adequate family and caregiver support,Adequate social supports    Current Resources:   Patient receiving home care services: No     Community Resources: None  Equipment currently used at home: lift device,wheelchair, manual,shower chair,commode chair  Supplies currently used at home:      Employment/Financial:  Employment Status: unemployed        Financial Concerns: No concerns identified           Lifestyle & Psychosocial Needs:  Social Determinants of Health     Tobacco Use: Medium Risk     Smoking Tobacco Use: Former Smoker     Smokeless Tobacco Use: Never Used   Alcohol Use: Not on file   Financial Resource Strain: Not on file    Food Insecurity: Not on file   Transportation Needs: Not on file   Physical Activity: Not on file   Stress: Not on file   Social Connections: Not on file   Intimate Partner Violence: Not on file   Depression: Not at risk     PHQ-2 Score: 1   Housing Stability: Not on file       Functional Status:  Prior to admission patient needed assistance:              Mental Health Status:          Chemical Dependency Status:                Values/Beliefs:  Spiritual, Cultural Beliefs, Voodoo Practices, Values that affect care: no               Additional Information:  Per care management consult, chart was reviewed. Patient was admitted on 12/23/21 for unresponsiveness with an anticipated discharge date of 1/10/22. PT/OT evaluation are recommending TCU upon discharge. Due to notes reporting patient is disoriented to place and lethargic, writer called to complete consult with patient's spouse and discuss recommendation for TCU. Writer spoke to spouse, Fernando over the phone. Fernando report that they live in a split level house in [place] with their son, Ramy. Ramy and Fernando are the primary support for patient. Through discussion and chart review, patient is alone during the day as both the spouse and son work. Fernando assist spouse with getting out of bed into the w/c before leaving for work. Patient can transfer independently to commode during the day as baseline. Spouse reports that patient was able to shower herself and they have a walk in shower in the home and a stair lift. Spouse drives and has an adapted van with a wheelchair ramp. Son assist with cooking. Son and spouse assist patient once they are home in the evening as needed. PCP is confirmed. Writer discussed recommendation for TCU, spouse is in agreement. Patient has had two prior TCU stay's ([facility] [place] [facility] Spouse is asking for a referral to be sent to [facility] and [facility] in [facility]. Writer sent referral via  DOD. Spouse states that patient has medicare and will send writer a copy of the insurance card.     CRYS Weems, [facility]   Social Work   [facility]

## 2022-01-06 NOTE — PROVIDER NOTIFICATION
MD Notification    Notified Person: MD    Notified Person Name: Srinivasa Patel    Notification Date/Time:1/5/22 7815    Notification Interaction: [employer]    Purpose of Notification: Patient has increase work of breathing and oxygen needs. Spoke with respiratory therapist, wondering if we can do repeat chest x-ray?    Orders Received:    Comments:

## 2022-01-06 NOTE — PROGRESS NOTES
"PULMONOLOGY PROGRESS NOTE    Date of Admission: 12/23/2021    CC/Reason for Hospital visit: acute hypoxemic respiratory failure  SUBJECTIVE      Events reviewed since last seen. Patient on BiPAP overnight. Thora 1/5 with 500cc of transudate removed and pt feels better this am.       ROS: A Problem Pertinent review of systems was negative except for items noted in HPI.  Past Medical, Family, and Social/Substance History has been reviewed: No interval changes.  OBJECTIVE   Vital signs:  Temp: 97.7  F (36.5  C) Temp src: Axillary BP: (!) 150/101 Pulse: 84   Resp: 26 SpO2: 94 % O2 Device: BiPAP/CPAP Oxygen Delivery: 50 LPM Height: 175.3 cm (5' 9.02\") Weight: 113 kg (249 lb 1.9 oz)  Estimated body mass index is 36.77 kg/m  as calculated from the following:    Height as of this encounter: 1.753 m (5' 9.02\").    Weight as of this encounter: 113 kg (249 lb 1.9 oz).        I/O last 3 completed shifts:  In: 2860 [NG/GT:2860]  Out: 2950 [Urine:2950]      CONSTITUTIONAL/GENERAL APPEARANCE: Alert female. No Apparent Distress.  PSYCHIATRIC: Pleasant and appropriate mood and affect. Oriented x 3.  NECK: Neck appearance normal. No neck masses and the thyroid is not enlarged.   RESPIRATORY: Non-labored effort. Decreased BS anteriorly.  CARDIOVASCULAR: S1, S2, regular rate and rhythm.      LABORATORY ASSESSMENT    Arterial Blood Gas  Recent Labs   Lab 01/05/22  1523 01/04/22  1227   PH  --  7.38   PCO2  --  65*   PO2  --  87   HCO3  --  38*   O2PER 60 60     CBC  Recent Labs   Lab 01/06/22  0621 01/05/22  0640 01/04/22  0610 01/03/22  0800   WBC 16.3* 13.0* 11.8* 13.5*   RBC 5.19 5.08 4.95 5.28*   HGB 11.3* 11.1* 10.7* 11.5*   HCT 41.6 41.2 39.7 42.6   MCV 80 81 80 81   MCH 21.8* 21.9* 21.6* 21.8*   MCHC 27.2* 26.9* 27.0* 27.0*   RDW 22.5* 21.6* 22.2* 21.8*    302 298 316     BMP  Recent Labs   Lab 01/06/22  0801 01/06/22  0621 01/06/22  0400 01/06/22  0004 01/05/22  2304 01/05/22  0806 01/05/22  0640 01/04/22  1216 " 01/04/22  0610 01/03/22  0853 01/03/22  0800   NA  --  144  --   --   --   --  146*  --  143  --  142   POTASSIUM  --  3.8  --   --  3.7  --  3.2*  --  4.2  --  3.5   CHLORIDE  --  105  --   --   --   --  108  --  108  --  109   MARY  --  8.5  --   --   --   --  8.3*  --  8.4*  --  8.2*   CO2  --  38*  --   --   --   --  39*  --  31  --  31   BUN  --  18  --   --   --   --  17  --  19  --  15   CR  --  0.48*  --   --   --   --  0.53  --  0.62  --  0.56   * 146* 142* 149*  --    < > 147*   < > 117*   < > 149*    < > = values in this interval not displayed.     INRNo lab results found in last 7 days.   BNPNo lab results found in last 7 days.  VENOUS BLOOD GASES  Recent Labs   Lab 01/05/22  1523   PHV 7.36   PCO2V 78*   PO2V 39   HCO3V 44*   ATIYA 15.3*         Additional labs and/or comments:    IMAGING      CXR today - pending.    CXR 1/3 -  IMPRESSION: New feeding tube courses below the diaphragm. Endotracheal  tube, right IJ CVC, and NG tube have been removed. Cardiomegaly. Mild  infiltrate or atelectasis in the lung bases, mildly improved on the  left and stable on the right. No new findings.    CXR 12/27 -  IMPRESSION: Endotracheal tube tip approximately 6.5 cm from the  ron. Right IJ line stable. Probable increase in left lower lobe  atelectasis and/or infiltrate. Increased groundglass infiltrates at  the right base compared to previous.    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  Patient was admitted on 12/23/2021 after presenting with altered mental status and found to be in septic shock.  She was intubated on admission and required pressor support.  Patient was extubated 12/30/2021 and transferred to the floor 12/31/2021.  She was treated with Zosyn and changed to ampicillin on 1/3/2021 per ID.  On 1/3/2022 patient developed increasing respiratory distress requiring BiPAP support.  Repeat chest x-ray showed mild infiltrate versus atelectasis at the lung bases, mildly improved on the left and stable on the right.  Patient was felt to have possibly aspirated and has been made NPO.     PLAN:  1. Adjust oxygen, keep SaO2 > 90%  2. BiPAP prn.  3. Antibiotics - Ampicillin per ID.  4. NPO, elevate HOB, aspiration precautions  5. Suggest repeat cxr and if enlarging effusion, would suggest repeat thoracentesis with noncon CT chest once pleural space fully drained  6. Will follow    Osmani Blanco  [facility]  Office: [phone]  Pager: [phone]

## 2022-01-06 NOTE — PROVIDER NOTIFICATION
MD Notification    Notified Person: Dr. Van    Notification Date/Time: 1/6/2022 4:55 PM     Notification Interaction: paged    Purpose of Notification: FYI critical lab: PCO2 is 94    Orders Received: will have RT readjust bipap again, recheck ABGs in 2 hours after settings adjusted. Will possibly need to consider getting house NP involved if continues to have increased PCO2.     Comments:

## 2022-01-06 NOTE — PROGRESS NOTES
[facility]    Medicine Progress Note - Hospitalist Service       Date of Admission:  12/23/2021    Assessment & Plan           Amanda Richardson is a 56 year old female with a past medical history of MS with baclofen pump in place, chronic pain syndrome, diabetes mellitus type II, hypertension, hyperlipidemia, CKD, Non-hodgkin's lymphoma, dehydration, and Obesity who was admitted on 12/23/21 after presenting to ED with altered mental status and identified to be in septic shock. She was intubated on admission and required pressor support for presumed urinary source. She has since weaned from pressor support as well as ventilator and was extubated 12/30/21. Hospitalist was contacted to assume medical management and transfer from ICU. Transferred to floor 12/31.     Acute hypoxic and hypercapnic respiratory failure 2/2 sepsis  Acute metabolic encephalopathy  Left sided pleural effusion vs mucus plugging  Suspected Acute CHF, diastolic  Respiratory status was improving after extubation but then started worsening 1/3, CXR showed improment from previous though. Patient needed BiPAP intermittently and subsequently has been dependent on BiPAP mostly.  She was also lethargic while off BiPAP and ABG noted, hypercarbia- PCO2 65, compensated though, mentation has improved after being on BiPAP.   1/5:  Patient appeared more dyspneic off BiPAP.  CXR done this am shows near complete opacification of the left hemithorax possibly related to a large pleural effusion, or a bronchial mucous plug.  Thoracentesis done and BiPAP continued.  Able to remove 500 mL of yellow fluid with the thoracentesis   1/6:  Repeat CXR done and showed improvement of the pleural effusion but it is still present.  On my evaluation patient initially answering questions appropriately but per nursing later in the day during wound care she became more lethargic and patient having trouble maintaining oxygenation in the low 90s.  Stat  ABG ordered and RT to come assess   - Suspect patient has DARWIN as well, recommend sleep study as outpatient  - Titrate O2 as able.  Still on BiPAP.  If oxygenation worsens will likely need RRT for consideration of possible intubation   - ProBNP elevated, diuresed with lasix with good output.  Continue to re-assess  - RT following   - SLP evaluated patient and diet was advanced but given worsened respiratory status, on BiPAP, continue to keep NPO and will continue tube feeding   - Will consider ramos removal once respiratory status improves, and mobility improves  - Pulmonology following and appreciate their assistance.  Ultimately would like to obtain CT scan once pleural effusion near resolution     Addendum:   ABG showed worsening PCO2 of 88.  RT to adjust BiPAP   - Recheck ABG 2 hours after settings changed     Septic shock secondary to  enterococcus bacteremia  Suspected UTI  Renal stones  As above, presented with AMS and findings consistent with septic shock requiring pressor support, mechanical ventilation. WBC 34.7 at presentation, procalcitonin 1.63. CT CAP with bibasilar atelectais/infiltrate, air in left renal collecting system raising suspicion for emphysematous pyelitis, cholelithiasis, bilateral oval densities along pelvic sidewall of unclear significance. Initiated on vanco/zosyn on admit, has been on sole tx with zosyn since 12/27.  *BCx x 2 on admit positive for staph epidermidis, 1/2 positive for enterococcus faecalis on both bottles. Repeat blood cultures negative.  *UCx with 10-50k multiple morphotypes without predominant organism. Note appeared to have been drawn after receiving IV Abx  - Started on IV Zosyn (12/23). Given 2/2 staph bacteremia and a device (baclofen pump), ID consulted, felt staph epidermidis present on both sets 1 bottle each is contaminant.   - Given enterococcus as well in blood, abx changed to ampicillin-plan is total 2 weeks of IV antibiotic through 1/7/2022.    - Repeat CT  abd pelvis with no abscess/fluid collection.  - Bobo was blocked 1/4 night, flushed and had to be taken out, nursing noted passed stones  - PT/OT consult. Therapy as able  - Urology following and appreciate their assistance.  Plan to have patient follow up as no obstructing stones or hydronephrosis     Catheter associated right internal jugular and right cephalic vein superficial thrombosis   - Vascular medicine following and appreciate their assistance.  Heparin drip for now.  Transition to DOAC once able.  No hypercoagulable panel warranted     Hypernatremia, resolved  Na 153. Nutrition following, free water flushes have been increased. Improved to 146 today  - Continue free water 120ml q4h  - Monitor BMP     Paroxysmal atrial fibrillation  Dyspnea/chest tightness.  Resolved   Suspected acute diastolic CHF/fluid overload  Developed afib in setting of above on 12/24 requiring amiodarone infusion, has since been transitioned to PO amiodarone. Has since converted to NSR, EKG on admit with evidence of new inferior infarct, RBBB. Tele on 12/31 concerning for ST-segment elevations. Patient is asymptomatic without cp/sob. Had mild troponin elevation on admission, attributed to septic shock.  - Continue with Tele  - TTE completed- no WMA. LVEF normal. Early diastolic CHF noted. Now diuresing as above  - Continue metoprolol    - Cardiology consulted.  Per them continue amiodarone for now.  Switch to 200 mg p.o. daily at discharge.  She should get a 14-day Zio patch monitor put on at the time of discharge.  She should see cardiology a month after discharge to follow-up with the results of the Zio patch.  If negative for A. fib recurrence, will discontinue amiodarone.  holding off on initiation of anticoagulation as this was the first episode of A. fib due to other acute medical issues.  However, her [id number] score is 3 [hypertension, gender, diabetes] and hence if A. fib recurs she would require long-term  anticoagulation.     Acute on CKD, resolved  Hypokalemia  Baseline Cr nonelevated. Admission value 3.14, improved with resuscitation.  - Monitor BMP closely now with diuresis.  - Avoid nephrotoxins as able  -Replace potassium per protocol now that renal function has improved.     Diabetes mellitus type II  PTA regimen of metformin. Last Hgb A1c 7.1% on 12/24/21.  - Medium ssi  - Accuchecks QID     Hypertension  Hyperlipidemia  PTA regimen of losartan-hydrochlorothiazide 50-12.5, metoprolol 50mg daily.  - Resumed PTA metoprolol. Statin eventually once LFTs normalize  - Resumed PTA losartan-hydrochlorothiazide- but with brisk diuresis, holding it. BP in 150s, if continue to trend up, resume.     Right proximal femur fracture, chronic  Has not been repaired 2/2 pandemic per pt/family decision.     Depression  - PTA duloxetine, amitriptyline     Transaminitis  Likely related to septic shock- shock liver, improving.             Diet: Adult Formula Drip Feeding: Continuous Vital High Protein; Nasogastric tube; Goal Rate: 65; mL/hr; Medication - Feeding Tube Flush Frequency: At least 15-30 mL water before and after medication administration and with tube clogging; Amount to Send...  NPO for Medical/Clinical Reasons Except for: Meds    DVT Prophylaxis: Heparin drip  Bobo Catheter: PRESENT, indication: Retention, Strict 1-2 Hour I&O, Neurogenic Bladder  Central Lines: None  Code Status: No CPR- Pre-arrest intubation OK      Disposition Plan   Expected Discharge: 01/10/2022     Anticipated discharge location: inpatient rehabilitation facility    Delays:     Oxygen Needs            The patient's care was discussed with the Bedside Nurse, Care Coordinator/ and Patient.    I spent over 45 minutes of critical care time on the unit/floor managing the care of this patient. Patient continues to have a high probability of imminent or life-threatening deterioration due to acute illness, which required my direct  attention, intervention, and personal management. Over 50% of my time was spent at the bedside counseling the patient and coordinating care regarding services listed above and discussing the plan with nursing.   Discussions and planning of care with patient were also done prior to her lethargy      Mack Van,   Hospitalist Service  [facility]  Securely message with the Vocera Web Console (learn more here)  Text page via [employer] Paging/Directory        Clinically Significant Risk Factors Present on Admission                    ______________________________________________________________________    Interval History   Patient seen and examined.  On my initial evaluation she appeared to be mentating well on the BiPAP.  She had difficulty talking over the BiPAP but was able to nod yes/no appropriately.  She was still feeling short of breath but no pain.  She was NPO due to BiPAP.  No fevers noted.  After my evaluation though nursing noted patient appeared more lethargic after wound cares which was worse then previously.      Data reviewed today: I reviewed all medications, new labs and imaging results over the last 24 hours. I personally reviewed   CXR:  Left sided pleural effusion noted but appears improved from previous     Physical Exam   Vital Signs: Temp: 97.6  F (36.4  C) Temp src: Axillary BP: (!) 154/74 Pulse: 68   Resp: (!) 7 SpO2: 90 % O2 Device: BiPAP/CPAP Oxygen Delivery: 50 LPM  Weight: 249 lbs 1.92 oz  General Appearance: Resting in bed.  NAD  Respiratory: BiPAP in place.  Does not appear to be in respiratory distress on the BiPAP  Cardiovascular: RRR.  No obvious murmurs but difficult to auscultate over the BiPAP   GI: Soft.  Non-distended  Skin: No obvious rashes or cyanosis to exposed skin  Other: Alert.  Answering questions with nodding yes/no.  Moving all extremities grossly      Data   Recent Labs   Lab 01/06/22  1154 01/06/22  0801 01/06/22  0621 01/06/22  0004  01/05/22  2304 01/05/22  1205 01/05/22  1203 01/05/22  0806 01/05/22  0640 01/04/22  1216 01/04/22  0610   WBC  --   --  16.3*  --   --   --   --   --  13.0*  --  11.8*   HGB  --   --  11.3*  --   --   --   --   --  11.1*  --  10.7*   MCV  --   --  80  --   --   --   --   --  81  --  80   PLT  --   --  337  --   --   --   --   --  302  --  298   NA  --   --  144  --   --   --   --   --  146*  --  143   POTASSIUM  --   --  3.8  --  3.7  --   --   --  3.2*  --  4.2   CHLORIDE  --   --  105  --   --   --   --   --  108  --  108   CO2  --   --  38*  --   --   --   --   --  39*  --  31   BUN  --   --  18  --   --   --   --   --  17  --  19   CR  --   --  0.48*  --   --   --   --   --  0.53  --  0.62   ANIONGAP  --   --  1*  --   --   --   --   --  <1*  --  4   MARY  --   --  8.5  --   --   --   --   --  8.3*  --  8.4*   * 155* 146*   < >  --    < >  --    < > 147*   < > 117*   ALBUMIN  --   --  2.1*  --   --   --   --   --  2.0*  --  1.9*   PROTTOTAL  --   --  8.4  --   --   --  7.9  --  7.9  --  7.8   BILITOTAL  --   --  0.5  --   --   --   --   --  0.5  --  0.5   ALKPHOS  --   --  135  --   --   --   --   --  130  --  135   ALT  --   --  93*  --   --   --   --   --  109*  --  129*   AST  --   --  28  --   --   --   --   --  32  --  59*    < > = values in this interval not displayed.     Recent Results (from the past 24 hour(s))   US Thoracentesis    Narrative    US THORACENTESIS 1/5/2022 2:21 PM    CLINICAL HISTORY: left pleural effusion, respiratory failure    PROCEDURE: Informed consent obtained. Time out performed. The chest  was prepped and draped in sterile fashion. 10 mL of 1% Xylocaine was  infused into the local soft tissues. Under direct ultrasound guidance,  a 5 [place] [employer] catheter system was placed into the pleural effusion.     500 mL of yellow fluid were removed and sent to lab, if requested.    Patient tolerated procedure well.    Ultrasound imaging was obtained and placed in the patient's  permanent  medical record.      Impression    IMPRESSION:  1.  Status post left ultrasound-guided thoracentesis.    Reference CPT Code: 34664    ELLI EVANS MD         SYSTEM ID:  [id number]   US Upper Extremity Venous Duplex Right    Narrative    ULTRASOUND RIGHT UPPER EXTREMITY VENOUS DUPLEX  1/5/2022 2:32 PM    CLINICAL HISTORY/INDICATION:  Arm swelling, edema vs DVT.    COMPARISON:  None relevant    TECHNIQUE: Grayscale, color-flow, and spectral waveform analysis were  performed of the deep veins of the right upper extremity    FINDINGS: Small focal nonocclusive deep vein thrombosis in the right  internal jugular vein. Superficial thrombus noted in the cephalic vein  throughout the upper arm and proximal forearm.    The right subclavian vein, axillary vein, brachial vein and basilic  vein demonstrate normal compressibility, spectral waveform, color flow  and augmentation.      Impression    IMPRESSION:   1. Focal short segment nonocclusive deep vein thrombosis in the right  internal jugular vein.  2. Superficial thrombus in the cephalic vein from the mid forearm to  the proximal upper arm.    KAVON GELLER DO         SYSTEM ID:  [id number]   XR Chest Port 1 View    Narrative    EXAM: XR CHEST PORT 1 VIEW  LOCATION: [facility]  DATE/TIME: 1/6/2022 12:14 AM    INDICATION: Increasing O2 demand.  COMPARISON: 1/5/2022.    FINDINGS: Nasoenteric feeding tube passes into the stomach. The heart is enlarged. There is no pulmonary edema. Left pleural effusion has decreased in the interval. There are left upper lobe and left basilar consolidations. Mild infiltrate in the right   upper lobe inferiorly. Atelectasis or scar at the right lung base.      Impression    IMPRESSION: Bilateral pulmonary infiltrates, left greater than right. Decreasing left pleural effusion.

## 2022-01-06 NOTE — CONSULTS
Consult Date: 01/05/2022    REQUESTING PHYSICIAN:  Cody Lee MD.    REASON FOR VASCULAR MEDICINE CONSULTATION:  Evaluation and management of catheter-associated right upper extremity superficial venous thrombosis as well as catheter-associated right internal jugular DVT, which is symptomatic and leading to right upper extremity swelling with concomitant shortness of breath with known pleural effusion, status post thoracentesis earlier today, who is also simultaneously a poor venous stick.    IMPRESSION:      1.  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    The patient is symptomatic from the above.  One treatment approach would simply be to remove the offending catheters and undertakes serial imaging.  However, I would not suggest this in the current patient given the fact that she is having symptomatic right upper extremity swelling and edema.  She is also having shortness of breath.  The likelihood of upper extremity embolization to the lungs is extremely low.  I would simply, for the time being, recommend that she be anticoagulated therapeutically.  Eventually, she can be transitioned to therapeutic oral anticoagulation in the form of a DOAC.  She is morbidly obese, but is not at a weight that exceeds the upper limit of recommended utilization for DOACs.  Nonetheless, for the time being, I would simply leave the patient on an IV unfractionated heparin drip.  She had a thoracentesis performed earlier today and I would prefer to have her on a form of anticoagulation which could be rapidly turned off if the need arises.  A.  No hypercoagulable workup is warranted as these are catheter-associated thrombotic events.    B.  If able to tolerate anticoagulation, I would recommend a minimum of 12 weeks of anticoagulation.  We will see the patient in outpatient followup to determine ultimate timing of cessation of anticoagulation.    C.  I  would not presently recommended obtaining a PE protocol CT of the chest for reasons as delineated above.      2.  Poor venous access.      The patient will likely require a PICC line in her contralateral left upper extremity.  I would like to avoid this given her propensity to performing catheter-associated thrombosis.  However, the patient does require staple venous access.  As such, a PICC line may be needed.  I will defer to the Hospitalist Team pertaining to this.      HISTORY OF PRESENT ILLNESS:  The patient is a very morbidly obese 56-year-old female with a previous medical history notable for multiple sclerosis with a baclofen pump in place, chronic pain syndrome, type 2 diabetes, hypertension, hyperlipidemia, chronic kidney disease, non-Hodgkin's lymphoma, who presented on 12/23/2021 with altered mental status and was found to be in septic shock.  She was intubated upon admission, required pressor support with multiple areas of venous access.  She had a presumed urinary source.  She was able to be weaned off of pressor and ventilatory support and was extubated on 12/03/2021 and then subsequently transferred to the floor on 12/31/2021.  Her septic shock has been found to be secondary to Enterococcus bacteremia.  She also has renal stones.  She has a left sided pleural effusion, which is status post thoracentesis earlier today, and she is currently requiring BiPAP.  Additionally, she has paroxysmal atrial fibrillation and fluid overload.  All of these problems are being managed by the hospitalist.  Our input was sought regarding thrombosis, as delineated above.    REVIEW OF SYSTEMS:  Unable to be obtained as the patient was on BIPAP and could not fully speak.  However, she was roughly able to communicate that she feels moderate improvement in her shortness of breath after her thoracentesis.  She currently denies chest pain or chest pressure, but does have an asymmetrically enlarged right upper extremity.   Remainder of her 14-point review of systems was unable to be obtained.    PREVIOUS MEDICAL HISTORY:    1.  Paroxysmal atrial fibrillation.  2.  Chronic pain.  3.  Chronic kidney disease.  4.  Colon polyps.  5.  Depression.  6.  Gallstones.  7.  Hyperlipidemia.  8.  Hypertension.  9.  Multiple sclerosis.  10.  Non-Hodgkin's lymphoma diagnosed .  11.  Obesity.  12.  Chronic pain syndrome.  13.  Diabetes.  14.  Status post right hip fracture.  15.  Lumbar spinal stenosis.  16.  Former tobacco abuse.    PREVIOUS SURGICAL HISTORY:    1.  Cystoscopy with stenting, .  2.  Baclofen pump insertion, .  3.  Open reduction and internal fixation, ankle fractures 2015 and 2015.  4.  Colonoscopy  with tubular adenomas.  5.  Lumbar anterior fusion and 2-level posterior fusion.  6.  Sinus surgery.    FAMILY HISTORY:  Notable for hypertension, hypothyroidism in her  mother.  Her  father had coronary disease as well as unknown malignancy.  A sister has a history of breast cancer.  A son has hypothyroidism and her paternal grandfather is  with an unknown malignancy.    SOCIAL HISTORY:  Notable for the fact that the patient is .  She has 3 children.  She is currently unemployed.  She is a former smoker, having quit in , with a 15-pack-year tobacco use history.    PHYSICAL EXAMINATION:    VITAL SIGNS:  Temperature is 97.5, blood pressure is 151/68, respiratory rate is 17, pulse ox is 94% on 50 L high flow nasal cannula.  She is very morbidly obese appearing.  She has a Pickwickian habitus.  She has evidence of right internal jugular central having been removed as well as a right upper extremity antecubital peripheral IV, which has subsequently been removed.  Her right upper extremity is approximately 20% larger than the left upper extremity.  She has diminished breath sounds on the right.  She has crackles bilaterally.  HEART:  Regular rate and rhythm.  Normal S1, S2.  No S3, S4,  murmur, gallop or rub.  ABDOMEN:  Morbidly obese, soft, nondistended, nontender.  EXTREMITIES:  Without cyanosis or clubbing.  She has stasis dermatitis changes with both legs being currently wrapped.  Cranial nerves are intact.    ASSESSMENT AND PLAN:  Please see the above.    Dimitri Bobo MD        D: 2022   T: 2022   MT: [id number]    Name:     JIMMY SYLVESTER  MRN:      [id number]        Account:      [id number]   :      1965           Consult Date: 2022     Document: [id number]    cc:  Cody Lee MD

## 2022-01-06 NOTE — PLAN OF CARE
Assumed care [id number]. A/Ox4, lethargic at times throughout the day. Saturated >90% on both HFNC & BiPAP. Ultrasound of RUE showed nonocclusive DVT. Heparin gtt started @ 1800 units/hr. CXR showed opacification of left lung, US guided thoracentesis performed with 500 mL removed. Patient reports breathing felt better and ability to breath deeper improved. Tube feeding continues @ 65 mL/hr via NJ. Bobo draining well, adequate output, no leaking noted. WOC recommendations updated, see Note. Turned & repositioned frequently off bottom with Ax2-3 & lift. Tele NSR w/ 1st degree AV block & bundle branch block. PRN oxycodone given for pain x3. Multiple care teams consulted and saw pt today, see notes. Will continue to monitor.

## 2022-01-06 NOTE — PROGRESS NOTES
[facility]    Infectious Disease Progress Note    Date of [id number]/06/2022    Assessment:  1. E.fecalis sepsis of urinary source with septic shock /multiorgan dysfunction -respiratoy failure, JULIANNE, elevated LFTs and required ventilation/ pressor support- all improved. Echocardiogram without evidence of endocarditis, blood cxs cleared rapidly which also makes endocarditis less ikely.  2. 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 Has bilateral renal calculi , urology has evaluated and recommended stone management at a later date.  3. S.epidermidis bacteremia likely due to culture contamination. Polymicrobial bacteremia seems unlikely without indwelling cental line etc. Pump site does not appear infected, there is no overlying erythema and CT abdomen twice did not show stranding or fluid collection around pump site.  4. Worsening leukocytosis  5. JULIANNE and transaminitis related to sepsis resolved  6. Atrial fibrillation, on amiodarone and in sinus rhythm  7. Densities along the pelvic sidewall, unclear whether chronic fluid collection/seroma. or adenopathy. In view of sepsis, wound recommend re evaluation with imaging .  8. Cholelithiasis  9. Chronic medical conditions - DM, HTN, hyperlipidemia     Recommendations  1. Continue Ampicillin. Completing 2 week treatment course for E.fecalis tomorrow  2. Leukocytosis of unclear etiology, will continue to monitor  3. Follow blood cxs and pleural fluid cxs  4. Diuresis  5. No obvious sign of pump infection at this time, will need to be monitored when off antibiotics and if has recurrent signs of infection then will need to cosider pump removal  6. Plan repeating blood cxs 5-7 days after antibiotic discontinuation        Hazel Villagomez MD    Interval History   Remained afebrile, worsening leukocytosis today, had thoracentesis yesterday, no new complaints  today    Antimicrobial therapy  1/3 Ampicillin  prior  12/24-1/3/22 Zosyn  12/24-12/26 Vancomycin    Physical Exam   Temp: 97.6  F (36.4  C) Temp src: Axillary BP: (!) 154/74 Pulse: 68   Resp: (!) 7 SpO2: 90 % O2 Device: BiPAP/CPAP Oxygen Delivery: 50 LPM  Vitals:    01/02/22 0500 01/05/22 0530 01/06/22 0558   Weight: 123.8 kg (273 lb) 114 kg (251 lb 5.2 oz) 113 kg (249 lb 1.9 oz)     Vital Signs with Ranges  Temp:  [97.5  F (36.4  C)-97.7  F (36.5  C)] 97.6  F (36.4  C)  Pulse:  [68-86] 68  Resp:  [7-31] 7  BP: (149-186)/() 154/74  FiO2 (%):  [60 %-95 %] 90 %  SpO2:  [86 %-94 %] 90 %    GENERAL APPEARANCE:  Awake, has BiPAP mask on  EYES: Eyes grossly normal to inspection  NECK: no adenopathy  RESP: lungs clear   CV: regular rates and rhythm  ABDOMEN: soft, some discomfort over pump site but no erythema or induration  MS: chronic stasis changes, skin thickening and discoloration    Other:    Medications     baclofen (LIORESAL) intraTHECAL Internal Pump       dextrose       heparin 1,950 Units/hr (01/06/22 0801)       amiodarone  400 mg Oral or Feeding Tube Daily     ampicillin  2 g Intravenous Q6H     artificial saliva  2 spray Swish & Spit 4x Daily     aspirin  81 mg Oral or Feeding Tube Daily     DULoxetine  60 mg Oral BID     influenza recomb quadrivalent PF  0.5 mL Intramuscular Prior to discharge     insulin aspart  1-6 Units Subcutaneous Q4H     lactobacillus rhamnosus (GG)  1 capsule Oral BID     [Held by provider] losartan-hydrochlorothiazide  1 tablet Oral Daily     metoprolol tartrate  25 mg Oral or Feeding Tube BID     multivitamins w/minerals  15 mL Per Feeding Tube Daily     pantoprazole  40 mg Oral or Feeding Tube QAM AC     polyethylene glycol  17 g Oral or Feeding Tube Daily     sennosides  5 mL Oral or Feeding Tube BID     sodium chloride (PF)  3 mL Intracatheter Q8H       Data   All microbiology laboratory data reviewed.  Recent Labs   Lab Test 01/06/22  0621 01/05/22  0640 01/04/22  0610    WBC 16.3* 13.0* 11.8*   HGB 11.3* 11.1* 10.7*   HCT 41.6 41.2 39.7   MCV 80 81 80    302 298     Recent Labs   Lab Test 01/06/22  0621 01/05/22  0640 01/04/22  0610   CR 0.48* 0.53 0.62     Recent Labs   Lab Test 12/23/21  2121   SED 14     Component      Latest Ref Rng & Units 1/5/2022   % Neutrophils Fluid      % 60   % Lymphocytes Fluid      % 29   % Mono/Macro Fluid      % 9   % Eosinophils Fluid      % 1   % Lining Cells      % 1   Color Fluid      Colorless, Yellow Yellow   Appearance Fluid      Clear, Bloody Hazy (A)   WBC Fluid      /uL 208   Glucose Fluid      mg/dL 160   Lactate Dehydrogenase Fluid      U/L 79   Protein Total Fluid      g/dL 1.2     Microbiology  12/23 blood cxs 1 set 2/2 bottles E.fecalis and both sets 2/4 bottles S.epidermidis, 12/25 blood cxs negative        Culture Positive on the 1st day of incubation Abnormal         Enterococcus faecalis Panic     2 of 2 bottles   Staphylococcus epidermidis Panic     1 of 2 bottles         Resulting Agency: [facility]         Susceptibility                       Enterococcus faecalis Staphylococcus epidermidis       SUSY SUSY       Ampicillin <=2.0 ug/mL Susceptible           Ciprofloxacin     2.0 ug/mL Intermediate       Clindamycin     >=8.0 ug/mL Resistant       Erythromycin     >=8.0 ug/mL Resistant       Gentamicin     8.0 ug/mL Intermediate       Gentamicin Synergy Susceptible... Susceptible 1           Levofloxacin     4.0 ug/mL Resistant       Oxacillin     <=0.25 ug/mL Susceptible 2       Penicillin 2.0 ug/mL Susceptible           Tetracycline     <=1.0 ug/mL Susceptible       Vancomycin 1.0 ug/mL Susceptible 2.0 ug/mL Susceptible                    Imaging  1/6 CXR  EXAM: XR CHEST PORT 1 VIEW  LOCATION: [facility]  DATE/TIME: 1/6/2022 12:14 AM     INDICATION: Increasing O2 demand.  COMPARISON: 1/5/2022.     FINDINGS: Nasoenteric feeding tube passes into the stomach. The heart is enlarged. There is no pulmonary  edema. Left pleural effusion has decreased in the interval. There are left upper lobe and left basilar consolidations. Mild infiltrate in the right   upper lobe inferiorly. Atelectasis or scar at the right lung base.                                                                      IMPRESSION: Bilateral pulmonary infiltrates, left greater than right. Decreasing left pleural effusion.    1/3/2022  EXAM: CT ABDOMEN PELVIS W CONTRAST  LOCATION: [facility]  DATE/TIME: 1/3/2022 5:02 PM     INDICATION: Abdominal abscess/infection suspected.  COMPARISON: None.  TECHNIQUE: CT scan of the abdomen and pelvis was performed following injection of IV contrast. Multiplanar reformats were obtained. Dose reduction techniques were used.  CONTRAST: 135 mL Isovue-370.     FINDINGS:   LOWER CHEST: Bibasilar consolidation with tiny amount of pleural fluid. Cardiac enlargement.     HEPATOBILIARY: Calcified gallstones.     PANCREAS: Normal.     SPLEEN: Normal.     ADRENAL GLANDS: Stable mild nodular enlargement of the left adrenal gland.     KIDNEYS/BLADDER: Stable nonobstructing renal stones.     BOWEL: Nasogastric tube has been removed and replaced with a feeding tube with tip at the gastroduodenal junction.     LYMPH NODES: Normal.     VASCULATURE: Unremarkable.     PELVIC ORGANS: Lipid rich fibroid. Anasarca with ill-defined fluid in the subcutaneous tissues. Bobo catheter in the urinary bladder. Perirectal soft tissue haziness is stable. Bilateral sidewall lymphadenopathy unchanged.     MUSCULOSKELETAL: Surgical changes in the lumbar spine.                                                                      IMPRESSION:   1.  No significant change. There is stable pelvic sidewall lymphadenopathy.     2.  Bibasilar pulmonary consolidation with trace pleural effusions.     3.  Nonobstructing renal stones.     4.  Nasogastric tube has been removed and replaced with a feeding tube with tip at the  duodenojejunal junction.     5.  Postsurgical changes in the lumbar spine. Chronic displaced fracture of the right femoral neck.     6.  Cholelithiasis.     7.  Lipid rich fibroid in the uterus.      12/23 Ct chest abdomen pelvis  EXAM: CT CHEST ABDOMEN PELVIS W/O CONTRAST  LOCATION: [facility]  DATE/TIME: 12/23/2021 10:21 PM     INDICATION: Sepsis  COMPARISON: 03/16/2019  TECHNIQUE: CT scan of the chest, abdomen, and pelvis was performed without IV contrast. Multiplanar reformats were obtained. Dose reduction techniques were used.   CONTRAST: None.     FINDINGS:   LUNGS AND PLEURA: Mosaic attenuation. Bibasilar atelectasis or infiltrate. Trace pleural effusions.     MEDIASTINUM/AXILLAE: No adenopathy or pericardial effusion. Endotracheal and enteric tube.     CORONARY ARTERY CALCIFICATION: Mild-to-moderate.     HEPATOBILIARY: Cholelithiasis.     PANCREAS: Normal.     SPLEEN: Normal.     ADRENAL GLANDS: Thickening of the adrenal glands.     KIDNEYS/BLADDER: Multiple, bilateral renal calculi. Larger on the left 7 mm. Small amount of air in the left renal collecting system. Bobo within the bladder.     BOWEL: Normal caliber.     LYMPH NODES: Bilateral hypodensity along the pelvic sidewall. On the right 4.7 x 2.3 cm series 4 image 256 and on the left 3.9 x 1.7 cm image 262.     VASCULATURE: Atherosclerotic vascular calcification.     PELVIC ORGANS: 1.6 cm fatty lesion within the uterus.     MUSCULOSKELETAL: Postsurgical change lumbar spine. Chronic fracture right proximal femur. Degenerative change osseous structures. Implanted device right ventral abdomen.                                                                      IMPRESSION:  1.  Bibasilar atelectasis or infiltrate and trace pleural effusions.  2.  Small amount of air in the left renal collecting system. Was there recent instrumentation? Correlate for emphysematous pyelitis.  3.  Cholelithiasis. Gallbladder wall thickening not  excluded.  4.  Trace amount of free fluid.  5.  Bilateral renal calculi.  6.  1.6 cm fatty lesion within the uterus.  7.  Bilateral oval densities along the pelvic sidewall. Uncertain if these are chronic fluid collection/seroma. Possibility of adenopathy not excluded. Follow-up recommended.     12/23 TTE  Interpretation Summary     1. Normal left ventricular size and function. Left ventricular ejection  fraction of 60-65%. No segmental wall motion abnormalities noted.  2. The right ventricle is mildly dilated. The right ventricular systolic  function is normal.  3. Valves not well assessed on this technically difficult study.  Compared to the previous echocardiogram on 2/1/2019, there are no significant  changes. Technically very difficult study.

## 2022-01-06 NOTE — PLAN OF CARE
[facility]. Alert and oriented x3-4. Disoriented to place. Vital signs stable on BiPAP 80% FiO2, sats at 91-93%. Assist of 2 with lift, T/R Q2. NPO while on bipap, oral cares provided. Tube feeds infusing at goal 65ml/hr via NJ tube, @ 94cm. LS dim. BS+. BM-. Voiding via ramos. Breann-area and sacrum macerated. Abd folds, thighs and under breasts, red. Wound care done per plan of care. R arm +3-4 edema, warm, reddened, CMS intact, denies pain on arm; elevated on pillows. Pain managed with PRN Oxycodone. Denies nausea. Tele SR with 1st degree AVB and BBB.

## 2022-01-07 NOTE — PLAN OF CARE
Pt. Alert and oriented x4. Vital signs stable, on bipap AVAPS 80% FiO2. Assist of 2/lifts. NPO. Tolerating TF through keofeed @65 ml/hr. Lung sounds coarse, diminished. Bowel sounds normoactive, + flatus. BM x1, voiding adequately. 400 ml urine output. Heparin currently running at 2400 ml/hr. HepXA due now. Skin red, edematous, dusky. Wound care for inner thigh/buttock/coccyx completed. Pain managed with oxycodone, now denies pain or nausea. Tele:  w/ 1st degree AVB/BBB.

## 2022-01-07 NOTE — PLAN OF CARE
Pt is A&Ox4, lethargic, VSS on bipap AVAPS 80% FiO2 ex. hypertension. Lung sounds coarse and diminished, occasional weak cough. Tele SR with 1st degree AVB. Pt not OOB this shift, turned and repositioned every 2 hours to prevent pressure injuries. Voiding via Bobo catheter. NPO while on bipap, oral cares provided q2h and per pt request. TF running through NJ tube at 65 mL.hr with 120 mL free water flushes q4h. ABDIAS CMS, inner thigh/buttock/coccyx wound cares completed per plan of care, skin dusky, edematous, and red. Pain managed with PRN oxycodone x3. IV running heparin gtt at 2400 unit(s)/hr. Will continue to follow plan of care.

## 2022-01-07 NOTE — PLAN OF CARE
Assumed care [id number]. A/Ox4, with increasing lethargy throughout shift. Remains on BiPAP for continued SOB & oxygen needs. Increased from 80% to 90% FiO2 over shift. Settings adjusted by RT, on AVAPS settings at end of shift. ABGs drawn multiple times due to increasing CO2, next recheck @ 2000. House NP updated on patient condition at end of shift. NJ present, feedings continue @ 65 mL/hr goal. PRN oxy given for generalized pain over shift. Heparin gtt increased from 1800 units/hr to 2100 units/hr. Next Anti-Xa recheck @ 2130. RUE edematous, elevated when possible. Urine output adequate, no BM witnessed. Turned & repositioned in bed, oral cares performed per patient request.  @ bedside in afternoon, updated on changes.

## 2022-01-07 NOTE — PROGRESS NOTES
House TEDDY brief note:    Acute on chronic hypercapnic respiratory failure 2/2 L pleural effusion, recent sepsis with volume resuscitation in setting of HFpEF, MS, obesity hypoventilation syndrome.  Alternatively considered hypoglycemia, acute ICH/CVA, PE, medication effect (opioid therapy), HAP/VAP  Acute hypoxic respiratory failure 2/2 L pleural effusion, recent sepsis with volume resuscitation, possible aspiration pneumonitis vs PNA, possible mucous plug in setting of HFpEF, MS, recent JULIANNE on CKD.  Alternatively considered PE, HAP/VAP  Acute metabolic encephalopathy multifactorial 2/2 hypercapnia, recent sepsis, ? medication effect (opioid therapy).    Paged by nursing at 1841 noted pt's CO2 increasing in ABGs, despite maxed out on AVAPS, waxing waning lethargy; at risk for intubation.  Presented to pt's bedside after receiving page.  Pt resting on Bipap, AVAPS 550/10/25/90% with O2 sats 96%, RR upper 20s.  Pt's SBP 160s, HR 70s-80s, afebrile.  Upon arrival, pt easily opens eyes to voice, tired appearing, PERRL.  Pt able to state name, location.  Pt able to move all extremities to command.  Pt reports no blurred vision or headache.  Pt notes while on Bipap, no SOB sensation, reports no chest pain.  Pt using remote control to adjust TV channels at time of leaving pt's room.  At time of leaving pt's room, pt appears to be protecting airway.      Interventions:  - Will await previously scheduled ABG at 2000   - Will add on procalcitonin  - If worsening lethargy, may need to reduce oxycodone dosing vs frequency (noted pt on 15 mg Q6H PRN at home)  - Noted last COVID 19 PCR completed 12/31; will repeat now  - Continues on high intensity heparin protocol for catheter associated R internal jugular and R cephalic vein superficial thrombosis    - Noted pulmonology note from today states concern for possible aspiration (on 1/3) leading to worsening hypoxia requiring Bipap placement and [place] [facility]  In setting of  worsening respiratory status, uptrending leukocytosis and CXR noting ALBAN and L basilar consolidations, and mild infiltrate RUL, will resume pt on zosyn and discontinue ampicillin.  Will defer de-escalation to ID/rounding hospitalist as deemed appropriate  - If pt's O2 sats continue to worsen despite AVAPS, will discuss pt with intensivist at that time, ?bronch  - Discussed pt's clinical status with pt's , Fernando; all questions answered     Addendum: 2030: ABG notes improvement in pt's overall ventilation; will continue pt on AVAPS at this time.      BAYLEE Sharma, CNP  House TEDDY    I spent 20 min at pt's bedside and on unit managing and coordinating pt's overall plan of care.

## 2022-01-07 NOTE — PROGRESS NOTES
CLINICAL NUTRITION SERVICES - REASSESSMENT NOTE      Malnutrition: (12/30)  % Weight Loss:  None noted  % Intake:  </= 50% for >/= 5 days (severe malnutrition) (12/27) - resolved on TF  Subcutaneous Fat Loss:  None observed  Muscle Loss:  None observed  Fluid Retention:  Mild - doubt nutritional (12/26)     Malnutrition Diagnosis: Patient does not meet two of the above criteria necessary for diagnosing malnutrition       EVALUATION OF PROGRESS TOWARD GOALS   Diet:  NPO     Nutrition Support:  TF continues as outlined below ~  Type of Feeding Tube: NG  Enteral Frequency:  Continuous  Enteral Regimen: Vital HP at 65 mL/hr  Total Enteral Provisions: 1560 kcal (13 kcal/kg), 136 g protein (2.1 g/kg and 103% needs), 1304 mL H2O, 173 g CHO, no fiber   Free Water Flush: (12/29 - MD)120 mL every 4 hours   Certavite daily     Intake/Tolerance:    Remains on BIPAP, unable to PO  No TF interruptions documented     Labs reviewed: Na 144, K 3.9, Mg/Phos WNL on 1/3  Recent Labs   Lab 01/07/22  0857 01/07/22  0628 01/07/22  0504 01/07/22  0044 01/06/22  2037 01/06/22  1621   * 162* 131* 109* 113* 128*     Medications reviewed: lactobacillus 1 cap BID, senokot given this AM  Stooling: last BM x1 on 1/4  Wt: 266 lbs 12.11 oz - down 19 lbs from admission (6.6% in 2 weeks) but has been diuresing on lasix, which was last given 1/5  Vitals:    12/31/21 0200 01/02/22 0500 01/05/22 0530 01/06/22 0558   Weight: 124.3 kg (274 lb) 123.8 kg (273 lb) 114 kg (251 lb 5.2 oz) 113 kg (249 lb 1.9 oz)    01/07/22 0653   Weight: 121 kg (266 lb 12.1 oz)       ASSESSED NUTRITION NEEDS:  Dosing Weight:  121 kg (energy - 1/7/21 weight);  66 kg (protein - IBW)  Estimated Energy Needs:  1950-4058 kcals (11-14 Kcal/Kg)  Justification: obese  Estimated Protein Needs: 112-132 grams protein (1.7-2 g pro/Kg)  Justification: wound healing, hypercatabolism with acute illness, obesity guidelines  and CKD      NEW FINDINGS:   Noted PCO2 increasing; increasing  risk for intubation   Thoracentesis yesterday w/ 500 mL removed   SW w/ referrals out to TCUs    Previous Goals:   EN to meet % est needs until pt able to increase po intake  Evaluation: Met    Previous Nutrition Diagnosis:   No nutrition diagnosis identified at this time   Evaluation: No change      CURRENT NUTRITION DIAGNOSIS  No nutrition diagnosis identified at this time while TF meeting estimated needs     INTERVENTIONS  Recommendations / Nutrition Prescription  Continue TF as ordered     Implementation  None new    Goals  EN to meet % estimated needs       MONITORING AND EVALUATION:  Progress towards goals will be monitored and evaluated per protocol and Practice Guidelines      Cheryl Nguyen RD, LD  Clinical Dietitian

## 2022-01-07 NOTE — PROGRESS NOTES
[facility]    Medicine Progress Note - Hospitalist Service       Date of Admission:  12/23/2021    Assessment & Plan         Amanda Richardson is a 56 year old female with a past medical history of MS with baclofen pump in place, chronic pain syndrome, diabetes mellitus type II, hypertension, hyperlipidemia, CKD, Non-hodgkin's lymphoma, dehydration, and Obesity who was admitted on 12/23/21 after presenting to ED with altered mental status and identified to be in septic shock. She was intubated on admission and required pressor support for presumed urinary source. She has since weaned from pressor support as well as ventilator and was extubated 12/30/21. Hospitalist was contacted to assume medical management and transfer from ICU. Transferred to floor 12/31.      Acute hypoxic and hypercapnic respiratory failure 2/2 sepsis  Acute metabolic encephalopathy  Left sided pleural effusion vs mucus plugging  Suspected Acute CHF, diastolic  Worsening hypercapnea on 1/6  Respiratory status was improving after extubation but then started worsening 1/3, CXR showed improment from previous though. Patient needed BiPAP intermittently and subsequently has been dependent on BiPAP mostly.  She was also lethargic while off BiPAP and ABG noted, hypercarbia- PCO2 65, compensated though, mentation has improved after being on BiPAP.   1/5:  Patient appeared more dyspneic off BiPAP.  CXR done this am shows near complete opacification of the left hemithorax possibly related to a large pleural effusion, or a bronchial mucous plug.  Thoracentesis done and BiPAP continued.  Able to remove 500 mL of yellow fluid with the thoracentesis   1/6:  Repeat CXR done and showed improvement of the pleural effusion but it is still present.  On my evaluation patient initially answering questions appropriately but per nursing later in the day during wound care she became more lethargic and patient having trouble maintaining  oxygenation in the low 90s.  Stat ABG ordered and RT to come assess.  PCO2s were significantly elevated requiring adjustment in BiPAP settings   1/7:  Patient's mentation is improved.  Suspect her acute worsening her hypercapnea may be related to her opioid use  - Suspect patient has DARWIN as well, recommend sleep study as outpatient  - Titrate O2 as able.  Still on BiPAP.  If oxygenation worsens will likely need RRT for consideration of possible intubation   - ProBNP elevated, diuresed with lasix with good output.  Continue to re-assess  - RT following   - SLP evaluated patient and diet was advanced but given worsened respiratory status, on BiPAP, continue to keep NPO and will continue tube feeding.  If continues to remain on tube feeds next week may need to consider PEG   - Will consider ramos removal once respiratory status improves, and mobility improves  - Pulmonology following and appreciate their assistance.  If clinically worsens again recommends repeating thoracentesis       Septic shock secondary to  enterococcus bacteremia  Suspected UTI  Renal stones  As above, presented with AMS and findings consistent with septic shock requiring pressor support, mechanical ventilation. WBC 34.7 at presentation, procalcitonin 1.63. CT CAP with bibasilar atelectais/infiltrate, air in left renal collecting system raising suspicion for emphysematous pyelitis, cholelithiasis, bilateral oval densities along pelvic sidewall of unclear significance. Initiated on vanco/zosyn on admit, has been on sole tx with zosyn since 12/27.  *BCx x 2 on admit positive for staph epidermidis, 1/2 positive for enterococcus faecalis on both bottles. Repeat blood cultures negative.  *UCx with 10-50k multiple morphotypes without predominant organism. Note appeared to have been drawn after receiving IV Abx  - Started on IV Zosyn (12/23). Given 2/2 staph bacteremia and a device (baclofen pump), ID felt staph epidermidis present on both sets 1 bottle  each is contaminant.   - Given enterococcus as well in blood, abx changed to ampicillin-plan is total 2 weeks of IV antibiotic through 1/7/2022.    - Repeat CT abd pelvis with no abscess/fluid collection.  - Bobo was blocked 1/4 night, flushed and had to be taken out, nursing noted passed stones  - PT/OT consult. Therapy as able  - Urology following and appreciate their assistance.  Plan to have patient follow up as no obstructing stones or hydronephrosis   - ID following for antibiotic management and appreciative of their care      Catheter associated right internal jugular and right cephalic vein superficial thrombosis   - Vascular medicine following and appreciate their assistance.  Heparin drip for now.  Transition to DOAC once able.  No hypercoagulable panel warranted.  Recommend heparin drip over weekend      Hypernatremia, resolved  Na 153. Nutrition following, free water flushes have been increased. Improved to 146 today  - Continue free water 120ml q4h  - Monitor BMP     Paroxysmal atrial fibrillation  Dyspnea/chest tightness.  Resolved   Suspected acute diastolic CHF/fluid overload  Developed afib in setting of above on 12/24 requiring amiodarone infusion, has since been transitioned to PO amiodarone. Has since converted to NSR, EKG on admit with evidence of new inferior infarct, RBBB. Tele on 12/31 concerning for ST-segment elevations. Patient is asymptomatic without cp/sob. Had mild troponin elevation on admission, attributed to septic shock.  - Continue with Tele  - TTE completed- no WMA. LVEF normal. Early diastolic CHF noted. Now diuresing as above  - Continue metoprolol    - Cardiology consulted.  Per them continue amiodarone for now.  Switch to 200 mg p.o. daily at discharge.  She should get a 14-day Zio patch monitor put on at the time of discharge.  She should see cardiology a month after discharge to follow-up with the results of the Zio patch.  If negative for A. fib recurrence, will  discontinue amiodarone.  holding off on initiation of anticoagulation as this was the first episode of A. fib due to other acute medical issues.  However, her LHR1YB3-HPDi score is 3 [hypertension, gender, diabetes] and hence if A. fib recurs she would require long-term anticoagulation.     Acute on CKD, resolved  Hypokalemia  Baseline Cr nonelevated. Admission value 3.14, improved with resuscitation.  - Monitor BMP closely now with diuresis.  - Avoid nephrotoxins as able  -Replace potassium per protocol now that renal function has improved.     Diabetes mellitus type II  PTA regimen of metformin. Last Hgb A1c 7.1% on 12/24/21.  - Medium ssi  - Accuchecks QID     Hypertension  Hyperlipidemia  PTA regimen of losartan-hydrochlorothiazide 50-12.5, metoprolol 50mg daily.  - Resumed PTA metoprolol. Statin eventually once LFTs normalize  - Resumed PTA losartan-hydrochlorothiazide- but with brisk diuresis, holding it. BP in 150s, if continue to trend up, resume.     Right proximal femur fracture, chronic  Has not been repaired 2/2 pandemic per pt/family decision.     Depression  - PTA duloxetine, amitriptyline     Transaminitis  Likely related to septic shock- shock liver, improving.            Diet: Adult Formula Drip Feeding: Continuous Vital High Protein; Nasogastric tube; Goal Rate: 65; mL/hr; Medication - Feeding Tube Flush Frequency: At least 15-30 mL water before and after medication administration and with tube clogging; Amount to Send...  NPO for Medical/Clinical Reasons Except for: Meds    DVT Prophylaxis: Heparin drip   Bobo Catheter: PRESENT, indication: Retention, Strict 1-2 Hour I&O, Neurogenic Bladder  Central Lines: None  Code Status: No CPR- Pre-arrest intubation OK      Disposition Plan   Expected Discharge: 01/10/2022     Anticipated discharge location: inpatient rehabilitation facility    Delays:     Oxygen Needs            The patient's care was discussed with the Bedside Nurse, Care  Coordinator/, Patient and pulmonology Team.    Time Spent on this Encounter   I spent 45 minutes on the unit/floor managing the care of Sahil Staley. Over 50% of my time was spent on the following:   - Counseling the patient and/or family regarding:  diagnostic results, risks and benefits of treatment options and medical compliance  - Coordination of care with the: with patient, nursing and pulmonology    Mack Van DO  Hospitalist [facility]  Securely message with the Vocera Web Console (learn more here)  Text page via [employer] Paging/Directory        Clinically Significant Risk Factors Present on Admission                    ______________________________________________________________________    Interval History   Patient seen and examined.  Mentation appears overall improved.  Patient is back to being able to nod yes/no appropriately.  No pain.  Breathing is improved.  No fevers noted.  Currently NPO but tolerating tube feeds    Data reviewed today: I reviewed all medications, new labs and imaging results over the last 24 hours. I personally reviewed no images or EKG's today.    Physical Exam   Vital Signs: Temp: 97.8  F (36.6  C) Temp src: Axillary BP: (!) 147/83 Pulse: 64   Resp: 14 SpO2: 92 % O2 Device: BiPAP/CPAP    Weight: 266 lbs 12.11 oz  General Appearance: Resting comfortably. NAD   Respiratory: On BiPAP.  No respiratory distress.  Difficult to auscultate due to body habitus and BiPAP  Cardiovascular: RRR.  Distant heart sounds  GI: Soft.  Non-distended  Skin: Lower extremities are wrapped.  No obvious rashes or cyanosis to exposed skin  Other: Alert.  Able to nod appropriately      Data   Recent Labs   Lab 01/07/22  1054 01/07/22  0857 01/07/22  0628 01/06/22  0801 01/06/22  0621 01/06/22  0004 01/05/22  2304 01/05/22  0806 01/05/22  0640   WBC  --   --  15.3*  --  16.3*  --   --   --  13.0*   HGB  --   --  10.7*  --  11.3*  --   --   --  11.1*    MCV  --   --  81  --  80  --   --   --  81   PLT  --   --  309  --  337  --   --   --  302   NA  --   --  144  --  144  --   --   --  146*   POTASSIUM  --   --  3.9  --  3.8  --  3.7  --  3.2*   CHLORIDE  --   --  104  --  105  --   --   --  108   CO2  --   --  41*  --  38*  --   --   --  39*   BUN  --   --  19  --  18  --   --   --  17   CR  --   --  0.53  --  0.48*  --   --   --  0.53   ANIONGAP  --   --  <1*  --  1*  --   --   --  <1*   MARY  --   --  8.3*  --  8.5  --   --   --  8.3*   * 126* 162*   < > 146*   < >  --    < > 147*   ALBUMIN  --   --  2.0*  --  2.1*  --   --   --  2.0*   PROTTOTAL  --   --  7.9  --  8.4  --   --    < > 7.9   BILITOTAL  --   --  0.5  --  0.5  --   --   --  0.5   ALKPHOS  --   --  121  --  135  --   --   --  130   ALT  --   --  70*  --  93*  --   --   --  109*   AST  --   --  23  --  28  --   --   --  32    < > = values in this interval not displayed.     No results found for this or any previous visit (from the past 24 hour(s)).

## 2022-01-07 NOTE — PROVIDER NOTIFICATION
MD Notification    Notified Person: FLO Loaiza    Notification Date/Time: 1/6/2022 6:41 PM     Notification Interaction: paged    Purpose of Notification: FYI we have been watching this pt today, increasing CO2 on her ABGs while on Bipap, waxing and waning lethargy/orientation. she is now essentially maxed out on AVAPs. at risk for intubation.    Orders Received:     Comments:

## 2022-01-07 NOTE — PROGRESS NOTES
"PULMONOLOGY PROGRESS NOTE    Date of Admission: 12/23/2021    CC/Reason for Hospital visit: acute hypoxemic respiratory failure  SUBJECTIVE      Events reviewed since last seen. Inc somnolence throughout day yesterday, rising pCO2, improving with changes in NIV settings. Sedating meds weaned and awake and alert this am. Thora 1/5 with 500cc of transudate removed.      ROS: A Problem Pertinent review of systems was negative except for items noted in HPI.  Past Medical, Family, and Social/Substance History has been reviewed: No interval changes.  OBJECTIVE   Vital signs:  Temp: 97.8  F (36.6  C) Temp src: Axillary BP: (!) 147/83 Pulse: 64   Resp: 24 SpO2: 93 % O2 Device: BiPAP/CPAP Oxygen Delivery: 50 LPM Height: 175.3 cm (5' 9.02\") Weight: 121 kg (266 lb 12.1 oz)  Estimated body mass index is 39.37 kg/m  as calculated from the following:    Height as of this encounter: 1.753 m (5' 9.02\").    Weight as of this encounter: 121 kg (266 lb 12.1 oz).        I/O last 3 completed shifts:  In: 2954 [NG/GT:2954]  Out: 1525 [Urine:1525]      CONSTITUTIONAL/GENERAL APPEARANCE: Alert female. No Apparent Distress.  PSYCHIATRIC: Pleasant and appropriate mood and affect. Oriented x 3.  NECK: Neck appearance normal. No neck masses and the thyroid is not enlarged.   RESPIRATORY: Non-labored effort. Decreased BS anteriorly.  CARDIOVASCULAR: S1, S2, regular rate and rhythm.      LABORATORY ASSESSMENT    Arterial Blood Gas  Recent Labs   Lab 01/06/22 2021 01/06/22  1641 01/06/22  1405 01/05/22  1523 01/04/22  1227   PH 7.40 7.29* 7.30*  --  7.38   PCO2 73* 94* 89*  --  65*   PO2 73* 62* 72*  --  87   HCO3 44* 45* 44*  --  38*   O2PER 90 90 90 60 60     CBC  Recent Labs   Lab 01/07/22  0628 01/06/22  0621 01/05/22  0640 01/04/22  0610   WBC 15.3* 16.3* 13.0* 11.8*   RBC 4.95 5.19 5.08 4.95   HGB 10.7* 11.3* 11.1* 10.7*   HCT 40.1 41.6 41.2 39.7   MCV 81 80 81 80   MCH 21.6* 21.8* 21.9* 21.6*   MCHC 26.7* 27.2* 26.9* 27.0*   RDW 22.7* " 22.5* 21.6* 22.2*    337 302 298     BMP  Recent Labs   Lab 01/07/22  0857 01/07/22  0628 01/07/22  0504 01/07/22  0044 01/06/22  0801 01/06/22  0621 01/06/22  0004 01/05/22  2304 01/05/22  0806 01/05/22  0640 01/04/22  1216 01/04/22  0610   NA  --  144  --   --   --  144  --   --   --  146*  --  143   POTASSIUM  --  3.9  --   --   --  3.8  --  3.7  --  3.2*  --  4.2   CHLORIDE  --  104  --   --   --  105  --   --   --  108  --  108   MARY  --  8.3*  --   --   --  8.5  --   --   --  8.3*  --  8.4*   CO2  --  41*  --   --   --  38*  --   --   --  39*  --  31   BUN  --  19  --   --   --  18  --   --   --  17  --  19   CR  --  0.53  --   --   --  0.48*  --   --   --  0.53  --  0.62   * 162* 131* 109*   < > 146*   < >  --    < > 147*   < > 117*    < > = values in this interval not displayed.     INRNo lab results found in last 7 days.   BNPNo lab results found in last 7 days.  VENOUS BLOOD GASES  Recent Labs   Lab 01/05/22  1523   PHV 7.36   PCO2V 78*   PO2V 39   HCO3V 44*   ATIYA 15.3*         Additional labs and/or comments:    IMAGING      CXR today - pending.    CXR 1/3 -  IMPRESSION: New feeding tube courses below the diaphragm. Endotracheal  tube, right IJ CVC, and NG tube have been removed. Cardiomegaly. Mild  infiltrate or atelectasis in the lung bases, mildly improved on the  left and stable on the right. No new findings.    CXR 12/27 -  IMPRESSION: Endotracheal tube tip approximately 6.5 cm from the  ron. Right IJ line stable. Probable increase in left lower lobe  atelectasis and/or infiltrate. Increased groundglass infiltrates at  the right base compared to previous.    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  Patient was admitted on 12/23/2021 after presenting with altered mental status and found to be in septic shock.  She was intubated on admission and required pressor support.  Patient was extubated 12/30/2021 and transferred to the floor 12/31/2021.  She was treated with Zosyn and changed to ampicillin on 1/3/2021 per ID.  On 1/3/2022 patient developed increasing respiratory distress requiring BiPAP support.  Repeat chest x-ray showed mild infiltrate versus atelectasis at the lung bases, mildly improved on the left and stable on the right.  Patient was felt to have possibly aspirated and has been made NPO.     PLAN:  1. Adjust oxygen, keep SaO2 > 90%  2. BiPAP prn. Reasonable to try off bipap for a few hours while awake and alert  3. Antibiotics -  per ID.  4. NPO, elevate HOB, aspiration precautions  5. Agree with minimizing sedating meds.  6. If change in status, low threshold for repeat cxr and if enlarging effusion, would suggest repeat thoracentesis with noncon CT chest once pleural space fully drained  7. Will follow up Monday. Please call if needed.    Osmani Blanco  [facility] / [facility]  Office: [phone]  Pager: [phone]

## 2022-01-07 NOTE — PROGRESS NOTES
[facility]    Infectious Disease Progress Note    Date of Service : 01/07/2022     Assessment:  1. E.fecalis sepsis of urinary source with septic shock /multiorgan dysfunction -respiratoy failure, JULIANNE, elevated LFTs and required ventilation/ pressor support- all improved. Echocardiogram without evidence of endocarditis, blood cxs cleared rapidly which also makes endocarditis less ikely.  2. 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 Has bilateral renal calculi , urology has evaluated and recommended stone management at a later date.  3. S.epidermidis bacteremia likely due to culture contamination. Polymicrobial bacteremia seems unlikely without indwelling cental line etc. Pump site does not appear infected, there is no overlying erythema and CT abdomen twice did not show stranding or fluid collection around pump site.  4. Leukocytosis with possible aspiration. Ampicillin was changed to Zosyn  5. JULIANNE and transaminitis related to sepsis resolved  6. Atrial fibrillation, on amiodarone and in sinus rhythm  7. Densities along the pelvic sidewall, unclear whether chronic fluid collection/seroma. or adenopathy. In view of sepsis, wound recommend re evaluation with imaging .  8. Cholelithiasis  9. Chronic medical conditions - DM, HTN, hyperlipidemia     Recommendations  1. Completing treatment for E.fecalis bacteremia today  2. Leukocytosis related to aspiration, antibiotics were broadened to zosyn.   3. Follow WBC and clinical status    Hazel Villagomez MD    Interval History   Remains on Bipap, no new complaints today    Antimicrobial therapy  1/3 Ampicillin  prior  12/24-1/3/22 Zosyn  12/24-12/26 Vancomycin    Physical Exam   Temp: 97.8  F (36.6  C) Temp src: Axillary BP: 122/56 Pulse: 61   Resp: 16 SpO2: 91 % O2 Device: BiPAP/CPAP    Vitals:    01/05/22 0530 01/06/22 0558 01/07/22 0653   Weight: 114 kg (251 lb  5.2 oz) 113 kg (249 lb 1.9 oz) 121 kg (266 lb 12.1 oz)     Vital Signs with Ranges  Temp:  [97.4  F (36.3  C)-97.9  F (36.6  C)] 97.8  F (36.6  C)  Pulse:  [57-80] 61  Resp:  [13-29] 16  BP: (114-166)/(56-85) 122/56  FiO2 (%):  [80 %-90 %] 80 %  SpO2:  [89 %-100 %] 91 %    GENERAL APPEARANCE:  Awake, has BiPAP mask on  EYES: Eyes grossly normal to inspection  NECK: no adenopathy  RESP: lungs clear   CV: regular rates and rhythm  ABDOMEN: soft, some discomfort over pump site but no erythema or induration  MS: chronic stasis changes, skin thickening and discoloration    Other:    Medications     baclofen (LIORESAL) intraTHECAL Internal Pump       dextrose       heparin 2,400 Units/hr (01/07/22 0730)       amiodarone  400 mg Oral or Feeding Tube Daily     artificial saliva  2 spray Swish & Spit 4x Daily     aspirin  81 mg Oral or Feeding Tube Daily     DULoxetine  60 mg Oral BID     influenza recomb quadrivalent PF  0.5 mL Intramuscular Prior to discharge     insulin aspart  1-6 Units Subcutaneous Q4H     lactobacillus rhamnosus (GG)  1 capsule Oral BID     [Held by provider] losartan-hydrochlorothiazide  1 tablet Oral Daily     metoprolol tartrate  25 mg Oral or Feeding Tube BID     multivitamins w/minerals  15 mL Per Feeding Tube Daily     pantoprazole  40 mg Oral or Feeding Tube QAM AC     piperacillin-tazobactam  4.5 g Intravenous Q6H     polyethylene glycol  17 g Oral or Feeding Tube Daily     sennosides  5 mL Oral or Feeding Tube BID     sodium chloride (PF)  3 mL Intracatheter Q8H       Data   All microbiology laboratory data reviewed.  Recent Labs   Lab Test 01/07/22  0628 01/06/22  0621 01/05/22  0640   WBC 15.3* 16.3* 13.0*   HGB 10.7* 11.3* 11.1*   HCT 40.1 41.6 41.2   MCV 81 80 81    337 302     Recent Labs   Lab Test 01/07/22  0628 01/06/22  0621 01/05/22  0640   CR 0.53 0.48* 0.53     Recent Labs   Lab Test 12/23/21 2121   SED 14     Component      Latest Ref Rng & Units 1/5/2022   % Neutrophils  Fluid      % 60   % Lymphocytes Fluid      % 29   % Mono/Macro Fluid      % 9   % Eosinophils Fluid      % 1   % Lining Cells      % 1   Color Fluid      Colorless, Yellow Yellow   Appearance Fluid      Clear, Bloody Hazy (A)   WBC Fluid      /uL 208   Glucose Fluid      mg/dL 160   Lactate Dehydrogenase Fluid      U/L 79   Protein Total Fluid      g/dL 1.2      Microbiology  12/23 blood cxs 1 set 2/2 bottles E.fecalis and both sets 2/4 bottles S.epidermidis, 12/25 blood cxs negative        Culture Positive on the 1st day of incubation Abnormal         Enterococcus faecalis Panic     2 of 2 bottles   Staphylococcus epidermidis Panic     1 of 2 bottles         Resulting Agency: [facility]         Susceptibility                       Enterococcus faecalis Staphylococcus epidermidis       SUSY SUSY       Ampicillin <=2.0 ug/mL Susceptible           Ciprofloxacin     2.0 ug/mL Intermediate       Clindamycin     >=8.0 ug/mL Resistant       Erythromycin     >=8.0 ug/mL Resistant       Gentamicin     8.0 ug/mL Intermediate       Gentamicin Synergy Susceptible... Susceptible 1           Levofloxacin     4.0 ug/mL Resistant       Oxacillin     <=0.25 ug/mL Susceptible 2       Penicillin 2.0 ug/mL Susceptible           Tetracycline     <=1.0 ug/mL Susceptible       Vancomycin 1.0 ug/mL Susceptible 2.0 ug/mL Susceptible                    Imaging  1/6 CXR  EXAM: XR CHEST PORT 1 VIEW  LOCATION: [facility]  DATE/TIME: 1/6/2022 12:14 AM     INDICATION: Increasing O2 demand.  COMPARISON: 1/5/2022.     FINDINGS: Nasoenteric feeding tube passes into the stomach. The heart is enlarged. There is no pulmonary edema. Left pleural effusion has decreased in the interval. There are left upper lobe and left basilar consolidations. Mild infiltrate in the right   upper lobe inferiorly. Atelectasis or scar at the right lung base.                                                                      IMPRESSION: Bilateral  pulmonary infiltrates, left greater than right. Decreasing left pleural effusion.     1/3/2022  EXAM: CT ABDOMEN PELVIS W CONTRAST  LOCATION: [facility]  DATE/TIME: 1/3/2022 5:02 PM     INDICATION: Abdominal abscess/infection suspected.  COMPARISON: None.  TECHNIQUE: CT scan of the abdomen and pelvis was performed following injection of IV contrast. Multiplanar reformats were obtained. Dose reduction techniques were used.  CONTRAST: 135 mL Isovue-370.     FINDINGS:   LOWER CHEST: Bibasilar consolidation with tiny amount of pleural fluid. Cardiac enlargement.     HEPATOBILIARY: Calcified gallstones.     PANCREAS: Normal.     SPLEEN: Normal.     ADRENAL GLANDS: Stable mild nodular enlargement of the left adrenal gland.     KIDNEYS/BLADDER: Stable nonobstructing renal stones.     BOWEL: Nasogastric tube has been removed and replaced with a feeding tube with tip at the gastroduodenal junction.     LYMPH NODES: Normal.     VASCULATURE: Unremarkable.     PELVIC ORGANS: Lipid rich fibroid. Anasarca with ill-defined fluid in the subcutaneous tissues. Bobo catheter in the urinary bladder. Perirectal soft tissue haziness is stable. Bilateral sidewall lymphadenopathy unchanged.     MUSCULOSKELETAL: Surgical changes in the lumbar spine.                                                                      IMPRESSION:   1.  No significant change. There is stable pelvic sidewall lymphadenopathy.     2.  Bibasilar pulmonary consolidation with trace pleural effusions.     3.  Nonobstructing renal stones.     4.  Nasogastric tube has been removed and replaced with a feeding tube with tip at the duodenojejunal junction.     5.  Postsurgical changes in the lumbar spine. Chronic displaced fracture of the right femoral neck.     6.  Cholelithiasis.     7.  Lipid rich fibroid in the uterus.      12/23 Ct chest abdomen pelvis  EXAM: CT CHEST ABDOMEN PELVIS W/O CONTRAST  LOCATION: [facility]OS[facility]  DATE/TIME: 12/23/2021 10:21 PM     INDICATION: Sepsis  COMPARISON: 03/16/2019  TECHNIQUE: CT scan of the chest, abdomen, and pelvis was performed without IV contrast. Multiplanar reformats were obtained. Dose reduction techniques were used.   CONTRAST: None.     FINDINGS:   LUNGS AND PLEURA: Mosaic attenuation. Bibasilar atelectasis or infiltrate. Trace pleural effusions.     MEDIASTINUM/AXILLAE: No adenopathy or pericardial effusion. Endotracheal and enteric tube.     CORONARY ARTERY CALCIFICATION: Mild-to-moderate.     HEPATOBILIARY: Cholelithiasis.     PANCREAS: Normal.     SPLEEN: Normal.     ADRENAL GLANDS: Thickening of the adrenal glands.     KIDNEYS/BLADDER: Multiple, bilateral renal calculi. Larger on the left 7 mm. Small amount of air in the left renal collecting system. Bobo within the bladder.     BOWEL: Normal caliber.     LYMPH NODES: Bilateral hypodensity along the pelvic sidewall. On the right 4.7 x 2.3 cm series 4 image 256 and on the left 3.9 x 1.7 cm image 262.     VASCULATURE: Atherosclerotic vascular calcification.     PELVIC ORGANS: 1.6 cm fatty lesion within the uterus.     MUSCULOSKELETAL: Postsurgical change lumbar spine. Chronic fracture right proximal femur. Degenerative change osseous structures. Implanted device right ventral abdomen.                                                                      IMPRESSION:  1.  Bibasilar atelectasis or infiltrate and trace pleural effusions.  2.  Small amount of air in the left renal collecting system. Was there recent instrumentation? Correlate for emphysematous pyelitis.  3.  Cholelithiasis. Gallbladder wall thickening not excluded.  4.  Trace amount of free fluid.  5.  Bilateral renal calculi.  6.  1.6 cm fatty lesion within the uterus.  7.  Bilateral oval densities along the pelvic sidewall. Uncertain if these are chronic fluid collection/seroma. Possibility of adenopathy not excluded. Follow-up recommended.     12/23  TTE  Interpretation Summary     1. Normal left ventricular size and function. Left ventricular ejection  fraction of 60-65%. No segmental wall motion abnormalities noted.  2. The right ventricle is mildly dilated. The right ventricular systolic  function is normal.  3. Valves not well assessed on this technically difficult study.  Compared to the previous echocardiogram on 2/1/2019, there are no significant  changes. Technically very difficult study.

## 2022-01-07 NOTE — PROGRESS NOTES
Care Management Follow Up    Length of Stay (days): 14    Expected Discharge Date: 01/10/2022     Concerns to be Addressed: discharge planning     Patient plan of care discussed at interdisciplinary rounds: Yes    Anticipated Discharge Disposition: Transitional Care,Skilled Nursing Facilty     Anticipated Discharge Services: None  Anticipated Discharge DME: None    Patient/family educated on Medicare website which has current facility and service quality ratings: yes  Education Provided on the Discharge Plan:    Patient/Family in Agreement with the Plan: yes    Referrals Placed by CM/SW: Post Acute Facilities  Private pay costs discussed: Not applicable    Additional Information:  Writer spent time following up on referrals. Writer called and left a VM for [facility] however, per their recording they are not taking any admissions. Writer called and left a VM with Valeria Montoya JEFFERY.     Addendum: Writer received updated insurance card from patient's . Patient has [employer]. ID is [id number] Group: . Writer placed copy of insurance on chart.     CRYS Weems, LGSW   Social Work   [facility]

## 2022-01-07 NOTE — PROGRESS NOTES
[facility]  Vascular Medicine Progress Note       There will not be Vascular Medicine rounding services available this weekend. All medical care will be per the Hospitalist system. Vascular Medicine rounding will resume 1/10/2022.       Assessment and Plan:          1.  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     -The patient is symptomatic from the above.  One treatment approach would simply be to remove the offending catheters and undertakes serial imaging.  However, I would not suggest this in the current patient given the fact that she is having symptomatic right upper extremity swelling and edema.      -She is also having shortness of breath.  The likelihood of upper extremity embolization to the lungs is extremely low.  I would simply, for the time being, recommend that she be anticoagulated therapeutically.  I would not presently recommended obtaining a PE protocol CT of the chest for reasons as delineated above.      -She has pleural effusions. S/P repeat thoracentesis 1/6/2022.    -Eventually, when all procedures are done, she can be transitioned to therapeutic oral anticoagulation in the form of a DOAC.  She is morbidly obese, but is not at a weight that exceeds the upper limit of recommended utilization for DOACs.  Keep on IV UFH gtt over the weekend until it is clear she will not have any additional need for repeat thoracenteses.     -Turn off IV UFH gtt per radiology protocol prior to repeat thoracentesis if this is undertaken.     -No hypercoagulable workup is warranted as these are catheter-associated thrombotic events.      -If able to tolerate anticoagulation, I would recommend a minimum of 12 weeks of anticoagulation.  We will see the patient in outpatient followup to determine ultimate timing of cessation of anticoagulation.                        Interval History:   doing well; no cp, n/v/d, or abd pain.  and positive SOB except when on BiPAP              Review of Systems:   The 10 point Review of Systems is negative other than noted in the HPI             Medications:       amiodarone  400 mg Oral or Feeding Tube Daily     artificial saliva  2 spray Swish & Spit 4x Daily     aspirin  81 mg Oral or Feeding Tube Daily     DULoxetine  60 mg Oral BID     influenza recomb quadrivalent PF  0.5 mL Intramuscular Prior to discharge     insulin aspart  1-6 Units Subcutaneous Q4H     lactobacillus rhamnosus (GG)  1 capsule Oral BID     [Held by provider] losartan-hydrochlorothiazide  1 tablet Oral Daily     metoprolol tartrate  25 mg Oral or Feeding Tube BID     multivitamins w/minerals  15 mL Per Feeding Tube Daily     pantoprazole  40 mg Oral or Feeding Tube QAM AC     piperacillin-tazobactam  4.5 g Intravenous Q6H     polyethylene glycol  17 g Oral or Feeding Tube Daily     sennosides  5 mL Oral or Feeding Tube BID     sodium chloride (PF)  3 mL Intracatheter Q8H                  Physical Exam:     Vitals were reviewed  Patient Vitals for the past 24 hrs:   BP Temp Temp src Pulse Resp SpO2 Weight   01/07/22 0848 (!) 147/83 -- -- 64 -- -- --   01/07/22 0800 (!) 147/83 -- -- 66 14 92 % --   01/07/22 0653 -- -- -- -- -- -- 121 kg (266 lb 12.1 oz)   01/07/22 0600 (!) 149/66 -- -- 67 24 93 % --   01/07/22 0400 (!) 160/64 97.8  F (36.6  C) Axillary 73 25 (!) 89 % --   01/07/22 0200 (!) 149/71 -- -- 68 13 96 % --   01/07/22 0000 135/56 97.6  F (36.4  C) Axillary 60 24 100 % --   01/06/22 2200 137/62 -- -- 67 20 97 % --   01/06/22 2000 (!) 148/72 -- -- 80 20 96 % --   01/06/22 1951 -- 97.9  F (36.6  C) Axillary -- -- -- --   01/06/22 1800 (!) 166/85 -- -- 76 29 94 % --   01/06/22 1611 -- 97.4  F (36.3  C) Axillary -- -- -- --   01/06/22 1600 114/76 -- -- 76 26 93 % --   01/06/22 1400 (!) 157/79 -- -- 72 25 91 % --   01/06/22 1207 -- 97.6  F (36.4  C) Axillary -- -- -- --   01/06/22 1200 (!) 154/74 -- -- 68 (!) 7 90 % --   01/06/22  1123 (!) 155/74 -- -- 74 27 92 % --     Wt Readings from Last 4 Encounters:   01/07/22 121 kg (266 lb 12.1 oz)   01/29/20 114.3 kg (252 lb)   07/29/19 114.3 kg (252 lb)   07/17/19 114.3 kg (252 lb)       Intake/Output Summary (Last 24 hours) at 1/6/2022 1033  Last data filed at 1/6/2022 0600  Gross per 24 hour   Intake 2860 ml   Output 2950 ml   Net -90 ml     Constitutional: normal  Eyes: normal  ENT: normal  Neck: Supple, symmetrical, trachea midline, no adenopathy, thyroid symmetric, not enlarged and no tenderness, skin normal.  Hematologic / Lymphatic: normal  Back: normal  Lungs: decreased BS anteriorly  Cardiovascular: Regular rate and rhythm, normal S1 and S2, no S3 or S4, and no murmur noted.  Abdomen: normal  Musculoskeletal: RUE w/ significant enlargement compared to left; Not tense or taut  Neurologic: normal  Neuropsychiatric: normal  Skin: normal           Data:     Results for orders placed or performed during the hospital encounter of 12/23/21 (from the past 24 hour(s))   Glucose by meter   Result Value Ref Range    GLUCOSE BY METER POCT 157 (H) 70 - 99 mg/dL   Blood gas arterial   Result Value Ref Range    pH Arterial 7.30 (L) 7.35 - 7.45    pCO2 Arterial 89 (HH) 35 - 45 mm Hg    pO2 Arterial 72 (L) 80 - 105 mm Hg    FIO2 90     Bicarbonate Arterial 44 (H) 21 - 28 mmol/L    Base Excess/Deficit (+/-) 14.1 (H) -9.0 - 1.8 mmol/L   Heparin Unfractionated Anti Xa Level   Result Value Ref Range    Anti Xa Unfractionated Heparin 0.28 For Reference Range, See Comment IU/mL    Narrative    Therapeutic Range: UFH: 0.25-0.50 IU/mL for low intensity dosing,  0.30-0.70 IU/mL for high intensity dosing DVT and PE.  This test is not validated for other direct factor X inhibitors (e.g. rivaroxaban, apixaban, edoxaban, betrixaban, fondaparinux) and should not be used for monitoring of other medications.   Glucose by meter   Result Value Ref Range    GLUCOSE BY METER POCT 128 (H) 70 - 99 mg/dL   Blood gas arterial    Result Value Ref Range    pH Arterial 7.29 (L) 7.35 - 7.45    pCO2 Arterial 94 (HH) 35 - 45 mm Hg    pO2 Arterial 62 (L) 80 - 105 mm Hg    FIO2 90     Bicarbonate Arterial 45 (H) 21 - 28 mmol/L    Base Excess/Deficit (+/-) 14.5 (H) -9.0 - 1.8 mmol/L   Blood gas arterial   Result Value Ref Range    pH Arterial 7.40 7.35 - 7.45    pCO2 Arterial 73 (H) 35 - 45 mm Hg    pO2 Arterial 73 (L) 80 - 105 mm Hg    FIO2 90     Bicarbonate Arterial 44 (H) 21 - 28 mmol/L    Base Excess/Deficit (+/-) 16.6 (H) -9.0 - 1.8 mmol/L   Glucose by meter   Result Value Ref Range    GLUCOSE BY METER POCT 113 (H) 70 - 99 mg/dL   Symptomatic; Unknown COVID-19 Virus (Coronavirus) by PCR Nasopharyngeal    Specimen: Nasopharyngeal; Swab   Result Value Ref Range    SARS CoV2 PCR Negative Negative    Narrative    Testing was performed using the carmine  SARS-CoV-2 & Influenza A/B Assay on the carmine  Pamela  System.  This test should be ordered for the detection of SARS-COV-2 in individuals who meet SARS-CoV-2 clinical and/or epidemiological criteria. Test performance is unknown in asymptomatic patients.  This test is for in vitro diagnostic use under the FDA EUA for laboratories certified under CLIA to perform moderate and/or high complexity testing. This test has not been FDA cleared or approved.  A negative test does not rule out the presence of PCR inhibitors in the specimen or target RNA in concentration below the limit of detection for the assay. The possibility of a false negative should be considered if the patient's recent exposure or clinical presentation suggests COVID-19.  [facility] are certified under the Clinical Laboratory Improvement Amendments of 1988 (CLIA-88) as qualified to perform moderate and/or high complexity laboratory testing.   Heparin Unfractionated Anti Xa Level   Result Value Ref Range    Anti Xa Unfractionated Heparin 0.30 For Reference Range, See Comment IU/mL    Narrative    Therapeutic Range:  UFH: 0.25-0.50 IU/mL for low intensity dosing,  0.30-0.70 IU/mL for high intensity dosing DVT and PE.  This test is not validated for other direct factor X inhibitors (e.g. rivaroxaban, apixaban, edoxaban, betrixaban, fondaparinux) and should not be used for monitoring of other medications.   CRP inflammation   Result Value Ref Range    CRP Inflammation 67.5 (H) 0.0 - 8.0 mg/L   Glucose by meter   Result Value Ref Range    GLUCOSE BY METER POCT 109 (H) 70 - 99 mg/dL   Glucose by meter   Result Value Ref Range    GLUCOSE BY METER POCT 131 (H) 70 - 99 mg/dL   Comprehensive metabolic panel   Result Value Ref Range    Sodium 144 133 - 144 mmol/L    Potassium 3.9 3.4 - 5.3 mmol/L    Chloride 104 94 - 109 mmol/L    Carbon Dioxide (CO2) 41 (H) 20 - 32 mmol/L    Anion Gap <1 (L) 3 - 14 mmol/L    Urea Nitrogen 19 7 - 30 mg/dL    Creatinine 0.53 0.52 - 1.04 mg/dL    Calcium 8.3 (L) 8.5 - 10.1 mg/dL    Glucose 162 (H) 70 - 99 mg/dL    Alkaline Phosphatase 121 40 - 150 U/L    AST 23 0 - 45 U/L    ALT 70 (H) 0 - 50 U/L    Protein Total 7.9 6.8 - 8.8 g/dL    Albumin 2.0 (L) 3.4 - 5.0 g/dL    Bilirubin Total 0.5 0.2 - 1.3 mg/dL    GFR Estimate >90 >60 mL/min/1.73m2   CBC with platelets   Result Value Ref Range    WBC Count 15.3 (H) 4.0 - 11.0 10e3/uL    RBC Count 4.95 3.80 - 5.20 10e6/uL    Hemoglobin 10.7 (L) 11.7 - 15.7 g/dL    Hematocrit 40.1 35.0 - 47.0 %    MCV 81 78 - 100 fL    MCH 21.6 (L) 26.5 - 33.0 pg    MCHC 26.7 (L) 31.5 - 36.5 g/dL    RDW 22.7 (H) 10.0 - 15.0 %    Platelet Count 309 150 - 450 10e3/uL   Heparin Unfractionated Anti Xa Level   Result Value Ref Range    Anti Xa Unfractionated Heparin 0.10 For Reference Range, See Comment IU/mL    Narrative    Therapeutic Range: UFH: 0.25-0.50 IU/mL for low intensity dosing,  0.30-0.70 IU/mL for high intensity dosing DVT and PE.  This test is not validated for other direct factor X inhibitors (e.g. rivaroxaban, apixaban, edoxaban, betrixaban, fondaparinux) and should not  be used for monitoring of other medications.   Glucose by meter   Result Value Ref Range    GLUCOSE BY METER POCT 126 (H) 70 - 99 mg/dL

## 2022-01-08 NOTE — CONSULTS
PULMONARY / CRITICAL CARE CONSULT NOTE    Date / Time of Admission:  12/23/2021  9:09 PM    Assessment:     Amanda Richardson is a 56 year old female with history of MS with baclofen pump in place, neurogenic bladder, recurrent UTI's, chronic pain syndrome, diabetes mellitus type II, hypertension, hyperlipidemia, CKD, Non-hodgkin's lymphoma, Obesity who was admitted on 12/23/21 after presenting to ED with altered mental status. Patient was intubated, treated for septic shock due to UTI, blood Cx (+) E fecalis. Patient was weaned from pressor support as well as ventilator and was extubated 12/30/21. Patient was transferred to the floor on 12/31/21.   Increase O2 requirements, CXR showed opacification of left lower lobe, therapeutic thoracentesis was done , removal of 500 ml yellow fluid, transudate effusion.   Follow up CXR showed bilateral pulmonary infiltrates and left basal opacity. Abx were broaded to cover aspiration pneumonia. Venous US showed DVT right internal jugular, on heparin drip. Patient required intermittently BiPAP.   Rapid response on early morning 1/8/22. Severe hypoxia, SpO2 in the 70's.   Follow up CXR showed complete opacification of left lung.   Anesthesia intubated patient on the floor and patient was transferred to ICU.     1. Acute respiratory failure s/p intubation 1/8/22  2. Left lung collapse secondary to mucous plug  3. Aspiration pneumonia  4. Volume up status   5. Transudate left pleural effusion  6. E. Fecalis bacteremia secondary to UTI with negative follow up cultures   7. MS with baclofen pump in place  8. Neurogenic bladder, hx recurrent UTI's  9. Hx non-hodgkin's lymphoma  10. Obesity Body mass index is 39.37 kg/m .    Advance Directives:  DNR     Plan:   1. Titrate vent settings A/C 22/480/12/100%, keep SpO2 > 90, Plateau pressure < 32  2. Pulmonary toiletting , schedule albuterol/mucomyst and saline nebs  3. Plan for diagnostic / therapeutic bronchoscopy   4. Sedation to keep  RASS -2 to -3, propofol, precedex drip and fentanyl PRN  5. Heplock IV fluids  6. Hold BP meds  7. Titrate pressors to keep MAP > 65, NE drip if needed  8. Follow up BAL cultures  9. Broad Abx to zosyn and vancomycin  10. NJ tube, hold tube feedings   11. PPI for GI prophylaxis   12. DVT prophylaxis heparin drip     Please contact me if you have any questions.  Total critical care time, not including separately billable procedure time: 45 minutes  This patient had a high probability of imminent or life threatening deterioration due to acute respiratory failure which required my direct attention, intervention and personal management.     Enrike Yee  Pulmonary / Critical Care  01/08/2022  5:02 AM          ICU DAILY CHECKLIST                           Can patient transfer out of MICU? no    FAST HUG:    Feeding:  Feeding: no.  Patient is receiving NPO    Bobo: yes  Analgesia/Sedation:yes  Precedex, propofol, fentanyl  Thromboembolic prophylaxis: Yes; Mode:  Heparin  HOB>30:  Yes  Stress Ulcer Protocol Active: Yes; Mode: PPI  Glycemic Control: Any glucose > 180 no; Mode of Insulin Therapy: Sliding Scale Insulin    INTUBATED:  Can patient have daily waking:  No  Can patient have spontaneous breathing trial:  No    Restraints? yes    PHYSICAL THERAPY AND MOBILITY:  Can patient have PT and mobility trial: no  Activity: bedrest    Reason for consult :  Acute respiratory failure      HPI:  Amanda Richardson is a 56 year old female with history of MS with baclofen pump in place, neurogenic bladder, recurrent UTI's, chronic pain syndrome, diabetes mellitus type II, hypertension, hyperlipidemia, CKD, Non-hodgkin's lymphoma, Obesity who was admitted on 12/23/21 after presenting to ED with altered mental status. Patient was intubated, treated for septic shock due to UTI.   Patient was weaned from pressor support as well as ventilator and was extubated 12/30/21. Patient was transferred to the floor on 12/31/21.    Patient was evaluated by ID for E.fecalis bacteremia related to UTI, echocardiogram without evidence of endocarditis, follow up blood culture are negative.   Increase O2 requirements, CXR showed opacification of left lower lobe, therapeutic thoracentesis was done , removal of 500 ml yellow fluid, transudate effusion.   Follow up CXR showed bilateral pulmonary infiltrates and left basal opacity. Abx were broaded to cover aspiration pneumonia.   Increase O2 requirements, altered mental status, started on BiPAP.   Follow up CXR showed complete opacification of left lung.   Ongoing severe hypoxia, SpO2 in the 70's. Anesthesia intubated patient on the floor and patient was transferred to ICU.     Past Medical History:   Diagnosis Date     Abnormality of gait 07/27/2012     Arrhythmia     with sepsis     Chronic pain     [facility] - yearly, next in summer 2018     CKD (chronic kidney disease) stage 1, GFR 90 ml/min or greater     kidney stones     Colon polyps 01/2015    tubular adenomas x 2     Depressive disorder      Gallstone 06/11/2012     Hyperlipidemia LDL goal <70      Hypertension goal BP (blood pressure) < 140/90      Leukocytosis 06/11/2012     Moderate depressive episode (H)      MS (multiple sclerosis) (H) 2003    Dr Vigil/Gaby - [facility]     Multiple sclerosis (H)      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     Tobacco abuse 06/11/2012    former     Type 2 diabetes, HbA1c goal < 7% (H)      Allergies: Lisinopril and Flexeril [cyclobenzaprine hcl]     MEDS:  Current Facility-Administered Medications   Medication     acetylcysteine (MUCOMYST) 20 % nebulizer solution 1 mL     albuterol (PROVENTIL) neb solution 2.5 mg     amiodarone (PACERONE) tablet 400 mg     artificial saliva (BIOTENE MT) solution 2 spray     aspirin (ASA) chewable tablet 81 mg     baclofen (LIORESAL) intraTHECAL Internal Pump     baclofen (LIORESAL) tablet 10 mg     chlorhexidine (PERIDEX) 0.12 % solution 15 mL     dexmedetomidine (PRECEDEX) 400 mcg in 0.9% sodium chloride 100 mL     dextrose 10% infusion     glucose gel 15-30 g    Or     dextrose 50 % injection 25-50 mL    Or     glucagon injection 1 mg     DULoxetine (CYMBALTA) DR capsule 60 mg     fentaNYL (PF) (SUBLIMAZE) injection 50 mcg     heparin infusion 25,000 units in D5W 250 mL ANTICOAGULANT     influenza recomb quadrivalent PF (FLUBLOK) injection 0.5 mL     insulin aspart (NovoLOG) injection (RAPID ACTING)     lactobacillus rhamnosus (GG) (CULTURELL) capsule 1 capsule     levalbuterol (XOPENEX CONC) neb solution 0.63 mg     lidocaine (LMX4) cream     lidocaine 1 % 0.1-1 mL     loperamide (IMODIUM) capsule 2 mg     [Held by provider] losartan-hydrochlorothiazide (HYZAAR) 50-12.5 MG per tablet 1 tablet     [Held by provider] metoprolol tartrate (LOPRESSOR) tablet 25 mg     multivitamins w/minerals liquid 15 mL     naloxone (NARCAN) injection 0.2 mg    Or     naloxone (NARCAN) injection 0.4 mg    Or     naloxone (NARCAN) injection 0.2 mg    Or     naloxone (NARCAN) injection 0.4 mg     nitroGLYcerin (NITROSTAT) sublingual tablet 0.4 mg     norepinephrine (LEVOPHED) 4 mg in  mL infusion PREMIX     oxyCODONE (ROXICODONE) solution 7.5 mg      "pantoprazole (PROTONIX) 2 mg/mL suspension 40 mg    Or     pantoprazole (PROTONIX) IV push injection 40 mg     piperacillin-tazobactam (ZOSYN) 4.5 g vial to attach to  mL bag     polyethylene glycol (MIRALAX) Packet 17 g     propofol (DIPRIVAN) infusion     sennosides (SENOKOT) syrup 5 mL     simethicone (MYLICON) chewable tablet 80 mg     sodium chloride (NEBUSAL) 3 % neb solution 3 mL     sodium chloride (PF) 0.9% PF flush 3 mL     sodium chloride (PF) 0.9% PF flush 3 mL     vancomycin 1250 mg in 0.9% NaCl 250 mL intermittent infusion 1,250 mg     vancomycin 2500 mg in 0.9% NaCl 500 ml intermittent infusion 2,500 mg         Objective:   VITALS:  /51   Pulse 103   Temp 97.8  F (36.6  C) (Axillary)   Resp (!) 31   Ht 1.753 m (5' 9.02\")   Wt 121 kg (266 lb 12.1 oz)   LMP 12/11/2011   SpO2 (!) 84%   BMI 39.37 kg/m    VENT:  FiO2 (%): 100 %  Resp: (!) 31    EXAM:   Gen: obese, sedated, vented  HEENT: pale conjunctiva, moist mucosa  Neck: induration in right side of neck  Lungs: absent breath sound in the left hemithorax, ronchi right lung  CV: regular, no murmurs or gallops appreciated  Abdomen: soft, distended, NT, BS wnl  Ext: edema +, dressing both legs  Neuro: sedated, not arousable      Data Review:  Recent Labs   Lab 01/08/22  0147 01/08/22  0005 01/07/22  2112 01/07/22  2037 01/07/22  1633 01/07/22  1054   * 134* 147* 157* 142* 135*      1/8/2022 01:47   Sodium 144   Potassium 3.9   Chloride 103   Carbon Dioxide 43 (H)   Urea Nitrogen 23   Creatinine 0.52   GFR Estimate >90   Calcium 8.3 (L)   Anion Gap <1 (L)   Albumin 2.0 (L)   Protein Total 8.1   Bilirubin Total 0.6   Alkaline Phosphatase 123   ALT 60 (H)   AST 20   Procalcitonin <0.05      1/7/2022 13:47   WBC 15.3 (H)   Hemoglobin 10.4 (L)   Hematocrit 39.0   Platelet Count 299   RBC Count 4.70   MCV 83   MCH 22.1 (L)   MCHC 26.7 (L)   RDW 22.6 (H)      1/8/2022 03:23   pH Arterial 7.35   pCO2 Arterial 82 (HH)   PO2 Arterial 45 " (L)   Bicarbonate Arterial 46 (HH)   Base Excess Art 16.7 (H)   FIO2 100     XR CHEST PORTABLE 1 VIEW  LOCATION: [facility]  DATE/TIME: 01/08/2022, 3:20 AM   INDICATION: Hypoxia.  COMPARISON: 01/06/2022.  IMPRESSION: Complete opacification of the left hemithorax. This has significantly increased from the prior study and probably due to increasing large pleural effusion. Likely compressive atelectasis in the left lung. Infiltrate/consolidation in the right infrahilar region, increased from previous. Heart size difficult to evaluate due to opacification on the left. Pulmonary vascularity within normal limits. No significant bony abnormalities. Enteric tube tip below the diaphragm    By:  Enrike Yee MD, 01/08/2022  5:02 AM    Primary Care Physician:  Julius Hassan

## 2022-01-08 NOTE — ANESTHESIA PROCEDURE NOTES
Airway       Patient location during procedure: OR ([facility] - Operating Room or Procedural Area)       Procedure Start/Stop Times: 1/8/2022 4:13 AM  Staff -        CRNA: Shanta Mariee APRN CRNA       Performed By: CRNA  Consent for Airway        Urgency: elective  Indications and Patient Condition       Indications for airway management: romeo-procedural       Induction type:intravenous       Mask difficulty assessment: 0 - not attempted    Final Airway Details       Final airway type: endotracheal airway       Successful airway: Single subglottic suction  Endotracheal Airway Details        ETT size (mm): 7.5       Successful intubation technique: video laryngoscopy       VL Blade Size: Glidescope 3       Grade View of Cords: 2       Adjucts: stylet       Position: Right       Measured from: gums/teeth       Secured at (cm): 21       Bite block used: None    Post intubation assessment        Number of attempts at approach: 1       Number of other approaches attempted: 0       Secured with: commercial tube kim       Ease of procedure: easy       Dentition: Intact and Unchanged    Additional Comments       Epiglottis appears reddened and very swollen

## 2022-01-08 NOTE — PROGRESS NOTES
[facility]    Infectious Disease Progress Note    Date of Service : 01/08/2022     Assessment:  1. Acute hypoxic respiratory failure related to mucous plug /aspiration . S/p bronchoscopy.   2. 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 TTE without evidence of endocarditis, blood cxs cleared rapidly .  3. Perineal wounds, candida intertrigo  4. 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 Has bilateral renal calculi , urology has evaluated and recommended stone management at a later date.  5. S.epidermidis bacteremia likely due to culture contamination. Polymicrobial bacteremia seems unlikely. Pump site does not appear infected, there is no overlying erythema and CT abdomen done twice did not show stranding or fluid collection around pump site.  6. JULIANNE and transaminitis related to sepsis resolved  7. Atrial fibrillation, on amiodarone and in sinus rhythm  8. Densities along the pelvic sidewall, unclear whether chronic fluid collection/seroma. or adenopathy. In view of sepsis, wound recommend re evaluation with imaging .  9. Cholelithiasis  10. Chronic medical conditions - DM, HTN, hyperlipidemia, morbidly obese body habitus.     Recommendations  1. Check MRSA PCR and if negative , discontinue Vancomycin  2. Maintain on zosyn for now  3. Follow BAL cxs  4. Wound care consult for perianal wounds  5. Topical nystatin     Hazel Villagomez MD    Interval History   Overnight events noted, developed respiratory failure, now intubated and proned. Underwent bronchoscopy. Remains afebrile.    Antimicrobial therapy  1/6 Zosyn  1/8 vancomycin  prior  1/3-1/6 Ampicillin  12/24-1/3/22 Zosyn  12/24-12/26 Vancomycin    Physical Exam   Temp: 98.5  F (36.9  C) Temp src: Axillary BP:  101/51 Pulse: 90   Resp: 22 SpO2: 98 % O2 Device: Mechanical Ventilator    Vitals:    01/05/22 0530 01/06/22 0558 01/07/22 0653   Weight: 114 kg (251 lb 5.2 oz) 113 kg (249 lb 1.9 oz) 121 kg (266 lb 12.1 oz)     Vital Signs with Ranges  Temp:  [97.4  F (36.3  C)-98.5  F (36.9  C)] 98.5  F (36.9  C)  Pulse:  [] 90  Resp:  [10-33] 22  BP: ()/(41-94) 101/51  FiO2 (%):  [80 %-100 %] 100 %  SpO2:  [67 %-100 %] 98 %    Constitutional: intubated, prone  Lungs: posterior lungs clear  Cardiovascular: patient prone  Skin: chronic stasis changes bilateral lower extremities, perineal erythema, intertrigo, excoriated lesions  MS : BL LE lymphedema    Other:    Medications     baclofen (LIORESAL) intraTHECAL Internal Pump       dextrose       epoprostenol (VELETRI) 20 mcg/mL in sterile water inhalation solution       fentaNYL 200 mcg/hr (01/08/22 1118)     heparin 2,400 Units/hr (01/08/22 0653)     midazolam 5 mg/hr (01/08/22 1100)     norepinephrine 0.05 mcg/kg/min (01/08/22 1237)     propofol (DIPRIVAN) infusion 60 mcg/kg/min (01/08/22 1238)     sodium chloride 10 mL/hr at 01/08/22 0657       acetylcysteine  1 mL Nebulization Q6H     albuterol  2.5 mg Nebulization Q6H     amiodarone  400 mg Oral or Feeding Tube Daily     artificial saliva  2 spray Swish & Spit 4x Daily     aspirin  81 mg Oral or Feeding Tube Daily     chlorhexidine  15 mL Mouth/Throat Q12H     DULoxetine  60 mg Oral BID     influenza recomb quadrivalent PF  0.5 mL Intramuscular Prior to discharge     insulin aspart  1-6 Units Subcutaneous Q4H     lactobacillus rhamnosus (GG)  1 capsule Oral BID     [Held by provider] losartan-hydrochlorothiazide  1 tablet Oral Daily     [Held by provider] metoprolol tartrate  25 mg Oral or Feeding Tube BID     multivitamins w/minerals  15 mL Per Feeding Tube Daily     nystatin   Topical BID     pantoprazole  40 mg Per Feeding Tube QAM AC    Or     pantoprazole (PROTONIX) IV  40 mg Intravenous QAM AC      piperacillin-tazobactam  4.5 g Intravenous Q6H     polyethylene glycol  17 g Oral or Feeding Tube Daily     protein modular  2 packet Per Feeding Tube Q4H While awake     sennosides  5 mL Oral or Feeding Tube BID     sodium chloride  3 mL Nebulization Q6H     sodium chloride (PF)  3 mL Intracatheter Q8H     vancomycin  1,250 mg Intravenous Q12H       Data   All microbiology laboratory data reviewed.  Recent Labs   Lab Test 01/08/22  0652 01/07/22  1347 01/07/22  0628   WBC 15.3* 15.3* 15.3*   HGB 9.3* 10.4* 10.7*   HCT 36.1 39.0 40.1   MCV 85 83 81    299 309     Recent Labs   Lab Test 01/08/22  0652 01/08/22  0147 01/07/22  0628   CR 0.64 0.52 0.53     Recent Labs   Lab Test 12/23/21  2121   SED 14     Microbiology  12/23 blood cxs 1 set 2/2 bottles E.fecalis and both sets 2/4 bottles S.epidermidis, 12/25 blood cxs negative        Culture Positive on the 1st day of incubation Abnormal         Enterococcus faecalis Panic     2 of 2 bottles   Staphylococcus epidermidis Panic     1 of 2 bottles         Resulting Agency: [facility]         Susceptibility                       Enterococcus faecalis Staphylococcus epidermidis       SUSY SUSY       Ampicillin <=2.0 ug/mL Susceptible           Ciprofloxacin     2.0 ug/mL Intermediate       Clindamycin     >=8.0 ug/mL Resistant       Erythromycin     >=8.0 ug/mL Resistant       Gentamicin     8.0 ug/mL Intermediate       Gentamicin Synergy Susceptible... Susceptible 1           Levofloxacin     4.0 ug/mL Resistant       Oxacillin     <=0.25 ug/mL Susceptible 2       Penicillin 2.0 ug/mL Susceptible           Tetracycline     <=1.0 ug/mL Susceptible       Vancomycin 1.0 ug/mL Susceptible 2.0 ug/mL Susceptible                    Imaging  1/6 CXR  EXAM: XR CHEST PORT 1 VIEW  LOCATION: [facility]  DATE/TIME: 1/6/2022 12:14 AM     INDICATION: Increasing O2 demand.  COMPARISON: 1/5/2022.     FINDINGS: Nasoenteric feeding tube passes into the stomach. The  heart is enlarged. There is no pulmonary edema. Left pleural effusion has decreased in the interval. There are left upper lobe and left basilar consolidations. Mild infiltrate in the right   upper lobe inferiorly. Atelectasis or scar at the right lung base.                                                                      IMPRESSION: Bilateral pulmonary infiltrates, left greater than right. Decreasing left pleural effusion.     1/3/2022  EXAM: CT ABDOMEN PELVIS W CONTRAST  LOCATION: [facility]  DATE/TIME: 1/3/2022 5:02 PM     INDICATION: Abdominal abscess/infection suspected.  COMPARISON: None.  TECHNIQUE: CT scan of the abdomen and pelvis was performed following injection of IV contrast. Multiplanar reformats were obtained. Dose reduction techniques were used.  CONTRAST: 135 mL Isovue-370.     FINDINGS:   LOWER CHEST: Bibasilar consolidation with tiny amount of pleural fluid. Cardiac enlargement.     HEPATOBILIARY: Calcified gallstones.     PANCREAS: Normal.     SPLEEN: Normal.     ADRENAL GLANDS: Stable mild nodular enlargement of the left adrenal gland.     KIDNEYS/BLADDER: Stable nonobstructing renal stones.     BOWEL: Nasogastric tube has been removed and replaced with a feeding tube with tip at the gastroduodenal junction.     LYMPH NODES: Normal.     VASCULATURE: Unremarkable.     PELVIC ORGANS: Lipid rich fibroid. Anasarca with ill-defined fluid in the subcutaneous tissues. Bobo catheter in the urinary bladder. Perirectal soft tissue haziness is stable. Bilateral sidewall lymphadenopathy unchanged.     MUSCULOSKELETAL: Surgical changes in the lumbar spine.                                                                      IMPRESSION:   1.  No significant change. There is stable pelvic sidewall lymphadenopathy.     2.  Bibasilar pulmonary consolidation with trace pleural effusions.     3.  Nonobstructing renal stones.     4.  Nasogastric tube has been removed and replaced with  a feeding tube with tip at the duodenojejunal junction.     5.  Postsurgical changes in the lumbar spine. Chronic displaced fracture of the right femoral neck.     6.  Cholelithiasis.     7.  Lipid rich fibroid in the uterus.      12/23 Ct chest abdomen pelvis  EXAM: CT CHEST ABDOMEN PELVIS W/O CONTRAST  LOCATION: [facility]  DATE/TIME: 12/23/2021 10:21 PM     INDICATION: Sepsis  COMPARISON: 03/16/2019  TECHNIQUE: CT scan of the chest, abdomen, and pelvis was performed without IV contrast. Multiplanar reformats were obtained. Dose reduction techniques were used.   CONTRAST: None.     FINDINGS:   LUNGS AND PLEURA: Mosaic attenuation. Bibasilar atelectasis or infiltrate. Trace pleural effusions.     MEDIASTINUM/AXILLAE: No adenopathy or pericardial effusion. Endotracheal and enteric tube.     CORONARY ARTERY CALCIFICATION: Mild-to-moderate.     HEPATOBILIARY: Cholelithiasis.     PANCREAS: Normal.     SPLEEN: Normal.     ADRENAL GLANDS: Thickening of the adrenal glands.     KIDNEYS/BLADDER: Multiple, bilateral renal calculi. Larger on the left 7 mm. Small amount of air in the left renal collecting system. Bobo within the bladder.     BOWEL: Normal caliber.     LYMPH NODES: Bilateral hypodensity along the pelvic sidewall. On the right 4.7 x 2.3 cm series 4 image 256 and on the left 3.9 x 1.7 cm image 262.     VASCULATURE: Atherosclerotic vascular calcification.     PELVIC ORGANS: 1.6 cm fatty lesion within the uterus.     MUSCULOSKELETAL: Postsurgical change lumbar spine. Chronic fracture right proximal femur. Degenerative change osseous structures. Implanted device right ventral abdomen.                                                                      IMPRESSION:  1.  Bibasilar atelectasis or infiltrate and trace pleural effusions.  2.  Small amount of air in the left renal collecting system. Was there recent instrumentation? Correlate for emphysematous pyelitis.  3.  Cholelithiasis.  Gallbladder wall thickening not excluded.  4.  Trace amount of free fluid.  5.  Bilateral renal calculi.  6.  1.6 cm fatty lesion within the uterus.  7.  Bilateral oval densities along the pelvic sidewall. Uncertain if these are chronic fluid collection/seroma. Possibility of adenopathy not excluded. Follow-up recommended.     12/23 TTE  Interpretation Summary     1. Normal left ventricular size and function. Left ventricular ejection  fraction of 60-65%. No segmental wall motion abnormalities noted.  2. The right ventricle is mildly dilated. The right ventricular systolic  function is normal.  3. Valves not well assessed on this technically difficult study.  Compared to the previous echocardiogram on 2/1/2019, there are no significant  changes. Technically very difficult study.

## 2022-01-08 NOTE — PROCEDURES
Name of Procedure: Arterial Line Placement for the purpose of hemodynamic monitoring    Date of Procedure: 1/8/2022    Description of Procedure   The patient was placed in the supine position. The puncture site was then prepped and draped in the usual sterile fashion. 2 ml of 1% lidocaine was utilized for local anesthesia.   Under direct ultrasound guidance, a 20-gauge needle was then advanced through the patient's skin and subcutaneous tissue and entered into the patient's left axillary artery. Strong pulsatile blood flow was immediately observed coming from the needle. A guidewire was advanced through the needle without resistance. The needle was subsequently removed. Small longitudinal incision was made at the entry site and the arterial catheter was advanced. The guidewire was then removed and the catheter was attached to a transducer. It was subsequently sutured into place. A biopatch and sterile tegaderm dressing was applied. There were not any immediate complications and the patient tolerated the procedure well.    Number of attempts: 1    Findings: A good waveform was observed on the monitor and the arterial line blood pressure readings matched those obtained via the blood pressure cuff.    Uan De La Mater

## 2022-01-08 NOTE — PROCEDURES
CENTRAL LINE INSERTION PROCEDURE NOTE  (NON-OR)    Procedure Date: 1/8/2022   Performing Physician: Enrike Yee MD    Procedure:  Insertion of Central Venous Catheter  Right   INTERNAL  JUGULAR    Indications:  RESPIRATORY FAILURE       Estimated Blood Loss:  MINIMAL  ml  Complications: NONE     Findings:  Localization of right internal jugular under US.   Placement of triple lumen central line under US guidance    Procedure Details:   Procedure was done as an emergency : Yes  The site of the procedure was properly noted/marked. The patient was identified as Amanda Richardson with Date of Birth 1965 and the procedure verified as Insertion of Central Venous Catheter.  A Time Out was held and the above information confirmed.    Under sterile conditions the skin over the  Right   INTERNAL  JUGULAR     was prepped with Chlorhexidine and covered with a sterile drape.  Strict sterile conditions were maintained,  Cap, mask, and sterile gloves were worn by all participants.  5 ml of Lidocaine 1% local anesthetic was infiltrated into the skin and subcutaneous tissues.  A 22-gauge needle was used to identify the vein.  Using Sledinger technique an 18-gauge needle was then inserted into the vein.  A guide wire was then passed easily through the catheter.  There were SVT    .  The catheter was then withdrawn.  A 7.0 [place] citizen tripple lumen   was then inserted into the vessel over the guide wire.  All ports were flushed with sterile solution and had good non-pulsatile blood return.  The catheter was sutured into place and an occlusive sterile dressing applied.  The patient tolerated the procedure well with no change in vital signs.     Number of attempts:  1     A chest x-ray was ordered.      Condition: unstable      ________________________________________________________________________  Enrike Yee MD  1/8/2022 5:30 AM

## 2022-01-08 NOTE — PROGRESS NOTES
Heparin gtt has been off since leaving [facility]. Restarted in ICU at 6:54 at rate of 2400 units per hour. HEPXA sent at 6:54.

## 2022-01-08 NOTE — PROGRESS NOTES
[facility] ICU PROGRESS NOTE  January 8, 2022      CO-MORBIDITIES:   Septic shock (H)  Acute hypoxemic respiratory failure (H)  NSTEMI (non-ST elevated myocardial infarction) (H)  Acute renal failure, unspecified acute renal failure type (H)  Hyperkalemia  Anuria  Emphysematous pyelitis  Shock liver  Encephalopathy  Muscle spasm  Decubitus ulcer of left leg    ASSESSMENT:   Amanda Richardson is a 56 year old female with history of MS with baclofen pump in place, neurogenic bladder, recurrent UTIs, chronic pain syndrome, DM II, hypertension, hyperlipidemia, CKD, Non-hodgkin's lymphoma, Obesity who was admitted on 12/23/21 after presenting to ED with altered mental status. Patient was intubated, treated for septic shock due to UTI, blood Cx (+) E fecalis. She was extubated 12/30/21, transferred to floor 12/31/21.   She had progressive hypoxia, underwent left therapeutic thoracentesis (500 ml). RRT 1/8/22 for severe hypoxia prompting intubation and ICU transfer.     Changes today:  - Deepen sedation, RAAS -4 to -5  - Stop Precedex  - Start midazolam infusion  - Start Fentanyl infusion  - Increase PEEP  - Start Veletri  - Place arterial line   - Prone  - Place OG    PLAN:    Neuro/ pain/ sedation:  #Acute pain  #Sedation for ventilator compliance  #MS, s/p baclofen pump  #Acute metabolic encephalopathy  #Depression  - Monitor neurological status. Notify the MD for any acute changes in exam.  - Pain: Start Fentanyl infusion plus PRN.   - Sedation: On Precedex and Propofol requiring high doses of the later. Will stop Precedex and start a Midazolam infusion to reach RAAS goal of -4 in anticipation of proning.   - Encephalopathy documented in prior hospitalist notes prior to intubation  - Continue Duloxetine     Pulmonary care:   #Acute hypoxemic hypercarbic respiratory failure  #HCAP  #Left pleural effusion, s/p thoracentesis 1/5  - Intubated this am.   - AC 22/480-->400/14-->16/90%. Last ABG 7.49/57/51/44. VT  reduced to 6 ml/kg of IBW. Plateau 26, peak 30. Synchronous.   - Start full strength veletri  - Plan for proning once enteral access placed and sedation optimized.   - s/p emergent bronchoscopy for mucous plugging. Post bronchoscopy CXR with improved aeration of left lung.   - Advance ETT 2 cm  - Continue mucolytic therapy with Mucomyst and Albuterol  - CPT    Cardiovascular:    #Septic shock  #Paroxysmal atrial fibrillation  #CHF  #Hyperlipidemia  - Monitor hemodynamic status.   - Norepinephrine to keep MAP > 65  - Check lactate  - Continue PO Amiodarone. Currently in SR.   - 1/1 ECHO Normal LV function and seize. EF 60-65%. RV mildly dilated.     GI care:   #Severe protein calorie malnutrition  #Cholelithiasis   - PPI  - RD consult for TF recommendations via SBFT  - Bowel regimen  -  Cholelithiasis present without cholecystitis in CT A/P 1/3. Hepatic panel reviewed. Only with mildly elevated ALT. Follow.     Renal/ Fluid Balance:    - Renal function normal, electrolytes balanced  - Add high intensity electrolyte replacement protocols.  - 8 kg down from admission weight, -5 L per I&O. Will follow strict I&Os and diurese as needed for even fluid balance.     Endocrine:    #DM II  - Sliding scale for diabetes management    ID/ Antibiotics:  #HCAP  # Enterococcus faecalis bacteremia 2/2 urinary source- treated   - Antibiotics: Continue Pip tazo (1/6-), Vancomycin added.   - Cultures pending: BAL.   - COVID-19 PCR negative. 1/5 pleural fluid culture negative.   - WBC 15K, procal negative. Afebrile.   - Completed E fecalis bacteremia treatment 1/7    Heme:   #RIJ DVT  #LIJ DVT  - Continue heparin infusion for RIJ DVT (not noted on bedside imaging today and new RIJ line in place now)  - L  internal jugular DVT noted on beside US today.   - Hemoglobin 9.3. Platelet count 311K      Lines/ tubes/ drains:  - RIJ TLC, L axillary arterial line, ETT, [place], [place].     Disposition:  ICU    Attempted to contact  x 3,  unsuccessful.    Patient seen, findings and plan discussed with surgical ICU staff, Dr. Jung.  Critical care time: 60 minutes exclusive of procedures    Una De La Mater    Addendum 1330  Discussed patient's status with  over the phone. He would like her to be full code. He does not recall making her a DNR, and in discussions with his wife prior to intubation, her wishes were to pursue aggressive therapy including resuscitation. In the setting of severe respiratory failure, if she arrested secondary to hypoxia or hypercarbia, resuscitation would be likely unsuccessful as the underlying cause could not be reversed. He understands this but again reiterated his and her wishes of trying any intervention in attempts to save her. Patient made full code on Epic.     Una De La Mater      Clinically Significant Risk Factors Present on Admission                 Code Status: No CPR- Pre-arrest intubation OK       ====================================    SUBJECTIVE:   Intubated, sedated,  Unable to obtain ROS. Moving lower extremities spontaneously.     OBJECTIVE:   1. VITAL SIGNS:   Temp:  [97.4  F (36.3  C)-98.5  F (36.9  C)] 98.5  F (36.9  C)  Pulse:  [] 86  Resp:  [10-33] 22  BP: ()/(41-94) 95/49  FiO2 (%):  [80 %-100 %] 100 %  SpO2:  [67 %-100 %] 88 %  Ventilation Mode: CMV/AC  (Continuous Mandatory Ventilation/ Assist Control)  FiO2 (%): 100 %  Rate Set (breaths/minute): 22 breaths/min  Tidal Volume Set (mL): 480 mL  PEEP (cm H2O): 12 cmH2O  Oxygen Concentration (%): 90 %  Resp: 22      2. INTAKE/ OUTPUT:   I/O last 3 completed shifts:  In: 123 [I.V.:3; NG/GT:120]  Out: 1100 [Urine:1100]    3. PHYSICAL EXAMINATION:     GEN: obese critically ill female.   EYES: PERRL, Anicteric sclera.   HEENT:  Normocephalic, atraumatic, trachea midline, ETT secure  CV: RRR, no gallops, rubs, or murmurs  PULM/CHEST: diminished breath sounds bilaterally without rhonchi, crackles or wheeze, symmetric chest  rise  GI: normal bowel sounds, soft, non-tender, no rebound tenderness or guarding, no masses  : ramos catheter in place, urine yellow and clear  EXTREMITIES: lower extremities wrapped, lymphedema,  moving lower extremities, peripheral pulses intact  NEURO: pupils equal and reactive, moving lower extremities spontaneously.   SKIN: yeast in intertriginous areas, multiple areas of ecchymosis over arms and wrists.   PSYCH:  sedated  Imaging personally reviewed: as above  ECG  4. INVESTIGATIONS:   Arterial Blood Gases   Recent Labs   Lab 01/08/22  0828 01/08/22  0323 01/07/22  1631 01/06/22 2021   PH 7.49*  7.49* 7.35 7.47* 7.40   PCO2 57*  57* 82* 64* 73*   PO2 51*  51* 45* 65* 73*   HCO3 44*  44* 46* 46* 44*     Complete Blood Count   Recent Labs   Lab 01/08/22  0652 01/07/22  1347 01/07/22  0628 01/06/22  0621   WBC 15.3* 15.3* 15.3* 16.3*   HGB 9.3* 10.4* 10.7* 11.3*    299 309 337     Basic Metabolic Panel  Recent Labs   Lab 01/08/22  0834 01/08/22  0652 01/08/22  0639 01/08/22  0147 01/07/22  0857 01/07/22  0628 01/06/22  0801 01/06/22  0621   NA  --  144  --  144  --  144  --  144   POTASSIUM  --  4.4  4.4  --  3.9  --  3.9  --  3.8   CHLORIDE  --  103  --  103  --  104  --  105   CO2  --  42*  --  43*  --  41*  --  38*   BUN  --  23  --  23  --  19  --  18   CR  --  0.64  --  0.52  --  0.53  --  0.48*   * 131* 125* 164*   < > 162*   < > 146*    < > = values in this interval not displayed.     Liver Function Tests  Recent Labs   Lab 01/08/22  0147 01/07/22  0628 01/06/22  0621 01/05/22  0640   AST 20 23 28 32   ALT 60* 70* 93* 109*   ALKPHOS 123 121 135 130   BILITOTAL 0.6 0.5 0.5 0.5   ALBUMIN 2.0* 2.0* 2.1* 2.0*     Pancreatic Enzymes  No lab results found in last 7 days.  Coagulation Profile  No lab results found in last 7 days.      5. RADIOLOGY:   Recent Results (from the past 24 hour(s))   XR Chest Port 1 View    Narrative    EXAM: XR CHEST PORTABLE 1 VIEW  LOCATION: [facility]  DATE/TIME: 01/08/2022, 3:20 AM    INDICATION: Hypoxia.  COMPARISON: 01/06/2022.      Impression    IMPRESSION: Complete opacification of the left hemithorax. This has significantly increased from the prior study and probably due to increasing large pleural effusion. Likely compressive atelectasis in the left lung. Infiltrate/consolidation in the right   infrahilar region, increased from previous. Heart size difficult to evaluate due to opacification on the left. Pulmonary vascularity within normal limits. No significant bony abnormalities. Enteric tube tip below the diaphragm.     XR Chest Port 1 View    Narrative    EXAM: XR CHEST PORTABLE 1 VIEW  LOCATION: [facility]  DATE/TIME: 01/08/2022, 5:53 AM    INDICATION: Location of ET tube, central line.  COMPARISON: 01/08/2022.    FINDINGS: ET tube 5.5 cm above the ron. Right IJ central venous catheter tip projects over the distal SVC. Nasoenteric feeding tube passes into the stomach. There is no pneumothorax. Improved aeration of the left lung compared to the previous exam.   Left pleural effusion and associated left lung infiltrate. Improving right infrahilar consolidation.      Impression    IMPRESSION: ET tube 5.5 cm above the ron.         =========================================

## 2022-01-08 NOTE — PHARMACY-VANCOMYCIN DOSING SERVICE
"Pharmacy Vancomycin Initial Note  Date of Service 2022  Patient's  1965  56 year old, female    Indication: Aspiration Pneumonia    Current estimated CrCl = Estimated Creatinine Clearance: 164 mL/min (based on SCr of 0.52 mg/dL).    Creatinine for last 3 days  2022:  6:40 AM Creatinine 0.53 mg/dL  2022:  6:21 AM Creatinine 0.48 mg/dL  2022:  6:28 AM Creatinine 0.53 mg/dL  2022:  1:47 AM Creatinine 0.52 mg/dL    Recent Vancomycin Level(s) for last 3 days  No results found for requested labs within last 72 hours.      Vancomycin IV Administrations (past 72 hours)      No vancomycin orders with administrations in past 72 hours.                Nephrotoxins and other renal medications (From now, onward)    Start     Dose/Rate Route Frequency Ordered Stop    22 1800  vancomycin 1250 mg in 0.9% NaCl 250 mL intermittent infusion 1,250 mg         1,250 mg  over 90 Minutes Intravenous EVERY 12 HOURS 22 0507      22 0530  norepinephrine (LEVOPHED) 4 mg in  mL infusion PREMIX         0.01-0.6 mcg/kg/min × 121 kg  4.5-272.3 mL/hr  Intravenous CONTINUOUS 22 0508      22 0530  vancomycin 2500 mg in 0.9% NaCl 500 ml intermittent infusion 2,500 mg         2,500 mg  over 120 Minutes Intravenous ONCE 22 0528      22 2100  piperacillin-tazobactam (ZOSYN) 4.5 g vial to attach to  mL bag        Note to Pharmacy: For [facility], [facility] and [facility]: For Zosyn-naive patients, use the \"Zosyn initial dose + extended infusion\" order panel.    4.5 g  over 30 Minutes Intravenous EVERY 6 HOURS 22            Contrast Orders - past 72 hours (72h ago, onward)            None          [employer] Prediction of Planned Initial Vancomycin Regimen  Loading dose: 2500 mg at 05:30 2022.  Regimen: 1250 mg IV every 12 hours.  Start time: 05:28 on 2022  Exposure target: AUC24 (range)400-600 mg/L.hr   AUC24,ss: 461 mg/L.hr  Probability of AUC24 > 400: 65 " %  Ctrough,ss: 14.8 mg/L  Probability of Ctrough,ss > 20: 26 %  Probability of nephrotoxicity (Lodise ROSEMARIE 2009): 10 %          Plan:  1. Start vancomycin  1250 mg IV q12h.   2. Vancomycin monitoring method: AUC  3. Vancomycin therapeutic monitoring goal: 400-600 mg*h/L  4. Pharmacy will check vancomycin levels as appropriate in 1-3 Days.    5. Serum creatinine levels will be ordered daily for the first week of therapy and at least twice weekly for subsequent weeks.      Xander Marcos [facility]

## 2022-01-08 NOTE — PLAN OF CARE
SLP - Given poor respiratory status over multiple days and pt intubation, will cancel current SLP orders.  Please re-consult if pt extubated/stable 24 hours s/p extubation and appropriate for SLP bedside swallow evaluation.

## 2022-01-08 NOTE — PROGRESS NOTES
1900h-0700h: Patient AOx4, lethargic & whispers. Afebrile. On BiPAP 16/8 FiO2 80% w/ O2Sat of >92%. Coarse crackles in R lung and L diminished. Intermittent dry cough. NPO. NJT in R naris, w/ continuous enteral feeding of Vital High protein at 65ml/hr & water flushes on 120 ml/4hr; tolerating. Bobo in place draining cloudy tasha urine. Moist and red on abdominal folds & R axilla. Sacrum excoriated, cleansed w/ mild soap & water. Generalized body weakness, requires lift/2 people assist to sides. Pitting edema 3+ on both feet, pedal pulses palpable, denies numbness/tingling. Repositioned every 2 hrs. Heparin infusing at 2400 u/hr on L hand. Tele: SR w/ BBB & prolonged QT interval.     Plan for PEG if will continue to require tube feeding & thoracentesis if respiratory status worsens as per MD's notes.    0235h: Hep Xa within goal range. Next Hep Xa tomorrow at 6am 01.09.22    0305h: Patient desaturated as low as 65% after turning to her R side. O2Sat stayed at 70's, use of accessory muscles, tachypenic. Less to no air entry in L lung. Paged RRT.  RT changed NIV from BiPAP to AVAP's still saturation <83%. Stat ABG (Respiratory acidosis compensated) & C-Xray done as per house NP's order. Significant change in C-Xray.     Intubated. Transferred to ICU. Report given to Jenifer MATTSON RN.

## 2022-01-08 NOTE — ANESTHESIA CARE TRANSFER NOTE
Patient: Amanda Richardson    Procedure: * No procedures listed *       Diagnosis: * No pre-op diagnosis entered *  Diagnosis Additional Information: No value filed.    Anesthesia Type:   No value filed.     Note:    Oropharynx: endotracheal tube in place and ventilatory support  Level of Consciousness: iatrogenic sedation      Independent Airway: airway patency not satisfactory and stable  Dentition: dentition unchanged  Vital Signs Stable: post-procedure vital signs reviewed and stable  Report to RN Given: handoff report given  Patient transferred to: ICU    ICU Handoff: Call for PAUSE to initiate/utilize ICU HANDOFF, Identified Patient, Identified Responsible Provider, Reviewed the Pertinent Medical History, Discussed Surgical Course, Reviewed Intra-OP Anesthesia Management and Issues during Anesthesia, Set Expectations for Post Procedure Period and Allowed Opportunity for Questions and Acknowledgement of Understanding      Vitals:  Vitals Value Taken Time   BP 99/62 01/08/22 0432   Temp     Pulse 76 01/08/22 0435   Resp 10 01/08/22 0435   SpO2 88 % 01/08/22 0435   Vitals shown include unvalidated device data.    Electronically Signed By: BAYLEE Duenas CRNA  January 8, 2022  4:35 AM

## 2022-01-08 NOTE — CODE/RAPID RESPONSE
[facility]    House TEDDY RRT Note  1/8/2022   Time Called: 0304    RRT called for: Hypoxia    Assessment & Plan     Acute hypoxic respiratory failure suspect multifactorial 2/2 possible mucous plug vs recurrent large L pleural effusion, possible aspiration (1/3), recent sepsis in setting of HFpEF, MS, obesity hypoventilation syndrome, suspected DARWIN.  - Upon arrival, pt lying in bed at ~ 20 degrees, awake but tired appearing, on full facemask Bipap with O2 sats 68-70% on 100% Bipap, RR 20s.  Nursing notes pt was recently turned in bed with noted worsening respiratory status after turn.  Pt had been transitioned to Bipap earlier this evening in setting of improving ABGs.  Requested for RT place pt back on AVAPS.  Despite transition to AVAPS, pt's O2 sats remains 80s after ~ 30 min.  Pt's HR 70s-80s, SBP 150s.  When returning to pt's room to follow up on AVAPS results, pt now using shoulder/chest/neck muscles for inspiratory effort.  Requested for anesthesia to present to pt's bedside for emergent intubation.      Anesthesia notes epiglottis appears reddened and very swollen.     INTERVENTIONS:  - Will trial AVAPS now  - Stat ABG  - Stat CXR  - After discussion with Dr. Terrazas, intensivist, will transfer pt to ICU for likely intubation and bronchoscopy.  Will formally consult intensivist; greatly appreciate further management of critically ill pt  - Discussed with pt concern regarding pt's overall clinical status, confirmed OK to intubate.  Will proceed with transfer to ICU  - As noted above, followed up with pt prior to transfer to ICU, despite AVAPS for the past 30 min, pt's O2 sats 85-86% and now with worsening work of breathing.  Requested for nursing to page anesthesia for emergent intubation  - Updated pt's , Fernando, regarding above, all questions answered    At the end of the RRT pt successfully intubated, transferred to ICU     Discussed with and defer further cares to nursing  and Dr. Terrazas, intensivist    Interval History     Amanda Richardson is a 56 year old female who was admitted on 12/23/2021 for AMS.    Medical history significant for: MS with baclofen pump in place, chronic pain syndrome, diabetes mellitus type II, hypertension, hyperlipidemia, CKD, Non-hodgkin's lymphoma, dehydration, depression Obesity, PAF    Code Status: No CPR- Pre-arrest intubation OK    Allergies   Allergies   Allergen Reactions     Lisinopril Angioedema     Lip swelling     Flexeril [Cyclobenzaprine Hcl] Other (See Comments)     confusion       Physical Exam   Vital Signs with Ranges:  Temp:  [97.4  F (36.3  C)-97.8  F (36.6  C)] 97.8  F (36.6  C)  Pulse:  [57-85] 79  Resp:  [14-33] 25  BP: (122-173)/(56-83) 138/71  FiO2 (%):  [80 %-90 %] 90 %  SpO2:  [89 %-96 %] 90 %  I/O last 3 completed shifts:  In: 1046 [I.V.:3; NG/GT:1043]  Out: 2075 [Urine:2075]    Constitutional: Pt lying in bed, awake but fatigued appearing, in moderate respiratory distress  Neck: No upper airway wheezes or stridor noted  Pulmonary: In moderate respiratory distress, tachypneic, clear lung sounds throughout R lung, diminished L lung  Cardiovascular: Regular rate and rhythm, normal S1S2, no murmur, rub or gallop noted  GI: Round, soft, nondistended  Skin/Integumen: Warm, dry, flushed facial appearance  Neuro: Awake, alert, able to follow commands, nods yes/no  Psych:  Calm  Extremities: BLE edema noted    Data       ABG:  Recent Labs   Lab 01/08/22  0323   PH 7.35   PCO2 82*   PO2 45*   HCO3 46*   O2PER 100       IMAGING: (X-ray/CT/MRI)   Recent Results (from the past 24 hour(s))   XR Chest Port 1 View    Narrative    EXAM: XR CHEST PORTABLE 1 VIEW  LOCATION: [facility]  DATE/TIME: 01/08/2022, 3:20 AM    INDICATION: Hypoxia.  COMPARISON: 01/06/2022.      Impression    IMPRESSION: Complete opacification of the left hemithorax. This has significantly increased from the prior study and probably due to  increasing large pleural effusion. Likely compressive atelectasis in the left lung. Infiltrate/consolidation in the right   infrahilar region, increased from previous. Heart size difficult to evaluate due to opacification on the left. Pulmonary vascularity within normal limits. No significant bony abnormalities. Enteric tube tip below the diaphragm.     XR Chest Port 1 View    Narrative    EXAM: XR CHEST PORTABLE 1 VIEW  LOCATION: [facility]  DATE/TIME: 01/08/2022, 5:53 AM    INDICATION: Location of ET tube, central line.  COMPARISON: 01/08/2022.    FINDINGS: ET tube 5.5 cm above the ron. Right IJ central venous catheter tip projects over the distal SVC. Nasoenteric feeding tube passes into the stomach. There is no pneumothorax. Improved aeration of the left lung compared to the previous exam.   Left pleural effusion and associated left lung infiltrate. Improving right infrahilar consolidation.      Impression    IMPRESSION: ET tube 5.5 cm above the ron.         Time Spent on this Encounter   I spent 45 minutes of critical care time on the unit/floor managing the care of Amanda Richardson. Upon evaluation, this patient had a high probability of imminent or life-threatening deterioration due to respiratory failure, which required my direct attention, intervention, and personal management. 100% of my time was spent at the bedside counseling the patient and/or coordinating care regarding services listed in this note.    BAYLEE Harrison [facility] TEDDY

## 2022-01-08 NOTE — PROGRESS NOTES
Hospitalist Update     Patient had worsening hypoxic respiratory failure over night.  Concern for possible mucous plugging.  Transferred to ICU and intubated for possible bronchoscopy and continued care.      Hospitalist service will sign off.  Please contact admitting service to resume care.       Mack Van DO   Hospitalist

## 2022-01-08 NOTE — CONSULTS
Clinical Nutrition Brief Note    Received additional Provider Consult for Registered Dietitian to order TF per Medical Nutrition Therapy Guidelines  RD currently following, refer to last assessment note dated yesterday 1/7 for more details    Chart reviewed:  - RRT called early this AM --> intubated d/t hypoxia and transferred to ICU  - TF is held   - Medications: Propofol (50.8 mL/hr) and Levophed resumed   - Labs: reviewed     Plan:   - When able to resume TF, start Vital High Protein at 10 mL/hr and hold at this rate   - Add Prosource 2 Pkts QID to meet protein requirements     TF Vital High Protein at 10 mL/hr provides 240 kcal, 21 gm protein, 27 gm CHO, 0 gm fiber and 200 mL free water  - Propofol at 50.8 mL/hr = 1341 kcal  - Prosource 2 pkts QID = 320 kcal and 88 gm protein  TOTAL PROVISIONS = 1901 kcal (16 kcal/kg and 112% estimated needs) and 109 gm protein (1.6 gm/kg and 97% estimated needs)    Free water flush per MD    Will continue to follow patient per protocol     Cheryl Nguyen RD, LD  Clinical Dietitian   Weekend pager [phone]

## 2022-01-08 NOTE — PROGRESS NOTES
ET Tube advanced per MD order. PT was @ 24 cm @ lip now 26 cm at lip.    Silva Walker, RRT  1/8/2022

## 2022-01-08 NOTE — PROGRESS NOTES
[facility] ICU RESPIRATORY NOTE        Date of Admission: 12/23/2021    Date of Intubation (most recent): 1/8/2022    Reason for Mechanical Ventilation: Resp. failure    Number of Days on Mechanical Ventilation: 1    Met Criteria for Spontaneous Breathing Trial: No    Significant Events Today: Pt intubated this am, Proned, full strength veletri.    ABG Results:   Recent Labs   Lab 01/08/22  1616 01/08/22  1410 01/08/22  0828 01/08/22  0323   PH 7.34* 7.48* 7.49*  7.49* 7.35   PCO2 78* 56* 57*  57* 82*   PO2 166* 203* 51*  51* 45*   HCO3 42* 42* 44*  44* 46*   O2PER 80 100 90  90 100       Current Vent Settings: Ventilation Mode: CMV/AC  (Continuous Mandatory Ventilation/ Assist Control)  FiO2 (%): 80 %  Rate Set (breaths/minute): 16 breaths/min  Tidal Volume Set (mL): 400 mL  PEEP (cm H2O): 20 cmH2O  Oxygen Concentration (%): 70 %  Resp: 19        Plan: Will continue full ventilatory support for now and assess for weaning readiness daily.               Silva Walker, RT

## 2022-01-08 NOTE — PROVIDER NOTIFICATION
Provider Notification    Notified Person: NP    Notified Person Name: Geovanna    Notification Date/Time: 7:27 PM    Notification Interaction: AMCOM    Purpose of Notification:313- K.C  FYI pt BiCarb increased from 44 to 46. RT adjusting a new mask. Please advise if we need to try anything else?   [phone]    Orders Received:    Comments:

## 2022-01-08 NOTE — PLAN OF CARE
Pt more alert and orientated. HTN. Bipap mask changed by RT. Frequent air leak on prior mask. Tube feed tube at goal rate. Pt was attempted to Highflow by RT but pt stated she couldn't breath. Pts BiCarb continue to be elevated.Plan pending. PRN oxycodone given via feeding tube.

## 2022-01-09 NOTE — PROGRESS NOTES
[facility]    Infectious Disease Progress Note    Date of Service : 01/09/2022     Assessment:  1. Acute hypoxic respiratory failure related to mucous plug /aspiration . S/p bronchoscopy. BAL cxs negative so far, but nasal MRSA PCR positive.   2. 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 TTE without evidence of endocarditis, blood cxs cleared rapidly .  3. Perineal wounds, candida intertrigo  4. 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 Has bilateral renal calculi , urology has evaluated and recommended stone management at a later date.  5. S.epidermidis bacteremia likely due to culture contamination. Polymicrobial bacteremia seems unlikely. Pump site does not appear infected, there is no overlying erythema and CT abdomen done twice did not show stranding or fluid collection around pump site.  6. JULIANNE and transaminitis related to sepsis were improved but worsening renal function again   7. Atrial fibrillation, on amiodarone and in sinus rhythm  8. Densities along the pelvic sidewall, unclear whether chronic fluid collection/seroma. or adenopathy. In view of sepsis, wound recommend re evaluation with imaging .  9. Cholelithiasis  10. Chronic medical conditions - DM, HTN, hyperlipidemia, morbidly obese body habitus.     Recommendations  1. Consider CT abdomen/pelvis for further evaluation of infection. R/o nephric /perinephric abscess with recent history of emphysematous pyelonephritis (US kidney stable however). Also has perineal excoriations/intertrigo , CT to assess for deeper soft tissue infection  2. BAL cxs remain negative thus far, but nasal MRSA PCR positive  3. Maintain on Vancomycin, Meropenem  4. Follow labs and cxs  Discussed with the ICU  team    Hazel Villagomez MD    Interval History   Sedated, intubated, requiring 2 pressors    Physical Exam   Temp: 100.3  F (37.9  C) Temp src: Axillary BP: (!) 114/38 Pulse: 105   Resp: 20 SpO2: 97 % O2 Device: Mechanical Ventilator    Vitals:    01/06/22 0558 01/07/22 0653 01/09/22 0230   Weight: 113 kg (249 lb 1.9 oz) 121 kg (266 lb 12.1 oz) 111.6 kg (246 lb 0.5 oz)     Vital Signs with Ranges  Temp:  [99.8  F (37.7  C)-101.3  F (38.5  C)] 100.3  F (37.9  C)  Pulse:  [] 105  Resp:  [0-22] 20  BP: (114-141)/(38-61) 114/38  FiO2 (%):  [70 %-80 %] 70 %  SpO2:  [94 %-100 %] 97 %      Constitutional: intubated, sedated  Lungs: posterior lungs clear  Cardiovascular: S1S2  Skin: chronic stasis changes bilateral lower extremities, perineal erythema, intertrigo, excoriated lesions  MS : BL LE lymphedema    Other:    Medications     baclofen (LIORESAL) intraTHECAL Internal Pump       dextrose       epoprostenol (VELETRI) 20 mcg/mL in sterile water inhalation solution 20 ng/kg/min (01/09/22 0547)     fentaNYL 200 mcg/hr (01/09/22 0547)     heparin 2,550 Units/hr (01/09/22 0207)     midazolam 8 mg/hr (01/09/22 0950)     norepinephrine 0.2 mcg/kg/min (01/09/22 1113)     propofol (DIPRIVAN) infusion 50 mcg/kg/min (01/09/22 1150)     sodium chloride 10 mL/hr at 01/08/22 0657     vasopressin 2.4 Units/hr (01/09/22 1131)       acetylcysteine  1 mL Nebulization Q6H     albuterol  2.5 mg Nebulization Q6H     amiodarone  400 mg Oral or Feeding Tube Daily     aspirin  81 mg Oral or Feeding Tube Daily     chlorhexidine  15 mL Mouth/Throat Q12H     [Held by provider] DULoxetine  60 mg Oral BID     hydrocortisone sodium succinate PF  50 mg Intravenous Q6H     influenza recomb quadrivalent PF  0.5 mL Intramuscular Prior to discharge     insulin aspart  1-6 Units Subcutaneous Q4H     lactated ringers  500 mL Intravenous Once     lactobacillus rhamnosus (GG)  1 capsule Oral BID     [Held by provider] losartan-hydrochlorothiazide  1 tablet  Oral Daily     meropenem  1 g Intravenous Q8H     [Held by provider] metoprolol tartrate  25 mg Oral or Feeding Tube BID     multivitamins w/minerals  15 mL Per Feeding Tube Daily     nystatin   Topical BID     pantoprazole  40 mg Per Feeding Tube QAM AC    Or     pantoprazole (PROTONIX) IV  40 mg Intravenous QAM AC     polyethylene glycol  17 g Oral or Feeding Tube Daily     protein modular  2 packet Per Feeding Tube Q4H While awake     sennosides  5 mL Oral or Feeding Tube BID     sodium chloride  3 mL Nebulization Q6H     sodium chloride (PF)  3 mL Intracatheter Q8H     vancomycin place kim - receiving intermittent dosing  1 each Does not apply See Admin Instructions       Data   All microbiology laboratory data reviewed.  Recent Labs   Lab Test 01/09/22  0603 01/08/22  0652 01/07/22  1347   WBC 21.3* 15.3* 15.3*   HGB 8.4* 9.3* 10.4*   HCT 31.5* 36.1 39.0   MCV 83 85 83    311 299     Recent Labs   Lab Test 01/09/22  0603 01/08/22  0652 01/08/22  0147   CR 1.59* 0.64 0.52     Recent Labs   Lab Test 12/23/21  2121   SED 14     Component      Latest Ref Rng & Units 1/8/2022   MRSA Target DNA      Negative Positive (A)   SA Target DNA       Positive     Microbiology  1/8 BAL cxs pending  12/23 blood cxs 1 set 2/2 bottles E.fecalis and both sets 2/4 bottles S.epidermidis, 12/25 blood cxs negative        Culture Positive on the 1st day of incubation Abnormal         Enterococcus faecalis Panic     2 of 2 bottles   Staphylococcus epidermidis Panic     1 of 2 bottles         Resulting Agency: [facility]         Susceptibility                       Enterococcus faecalis Staphylococcus epidermidis       SUSY SUSY       Ampicillin <=2.0 ug/mL Susceptible           Ciprofloxacin     2.0 ug/mL Intermediate       Clindamycin     >=8.0 ug/mL Resistant       Erythromycin     >=8.0 ug/mL Resistant       Gentamicin     8.0 ug/mL Intermediate       Gentamicin Synergy Susceptible... Susceptible 1           Levofloxacin     4.0  ug/mL Resistant       Oxacillin     <=0.25 ug/mL Susceptible 2       Penicillin 2.0 ug/mL Susceptible           Tetracycline     <=1.0 ug/mL Susceptible       Vancomycin 1.0 ug/mL Susceptible 2.0 ug/mL Susceptible                 Imaging  1/9 US kidney  EXAM: US RENAL COMPLETE  LOCATION: [facility]  DATE/TIME: 1/9/2022 10:42 AM     INDICATION: Septic shock, now with oliguric JULIANNE. CT 1/3 with non obstructing stones, evaluate for hydronephrosis.  COMPARISON: None available.  TECHNIQUE: Routine Bilateral Renal and Bladder Ultrasound.     FINDINGS: Technically challenging exam due to patient's body habitus and very limited mobility.     RIGHT KIDNEY: Measures 15.5 x 7.0 x 5.2 cm. It demonstrates normal cortical echogenicity and thickness of 1.9 cm. No hydronephrosis or shadowing calculi.      LEFT KIDNEY: Measures 12.5 x 6.1 x 6.1 cm. It demonstrates normal cortical echogenicity and thickness of 1.9 cm. No hydronephrosis or shadowing calculi.      BLADDER: Decompressed with Bobo's catheter.     Incidentally noted gallstones and gallbladder sludge.                                                                      IMPRESSION: Technically challenging exam due to patient's body habitus and limited mobility.  1.  No hydronephrosis or shadowing calculi in either kidney.  2.  Incidentally noted cholelithiasis and gallbladder sludge.    1/6 CXR  EXAM: XR CHEST PORT 1 VIEW  LOCATION: [facility]  DATE/TIME: 1/6/2022 12:14 AM     INDICATION: Increasing O2 demand.  COMPARISON: 1/5/2022.     FINDINGS: Nasoenteric feeding tube passes into the stomach. The heart is enlarged. There is no pulmonary edema. Left pleural effusion has decreased in the interval. There are left upper lobe and left basilar consolidations. Mild infiltrate in the right   upper lobe inferiorly. Atelectasis or scar at the right lung  base.                                                                      IMPRESSION: Bilateral pulmonary infiltrates, left greater than right. Decreasing left pleural effusion.     1/3/2022  EXAM: CT ABDOMEN PELVIS W CONTRAST  LOCATION: [facility]  DATE/TIME: 1/3/2022 5:02 PM     INDICATION: Abdominal abscess/infection suspected.  COMPARISON: None.  TECHNIQUE: CT scan of the abdomen and pelvis was performed following injection of IV contrast. Multiplanar reformats were obtained. Dose reduction techniques were used.  CONTRAST: 135 mL Isovue-370.     FINDINGS:   LOWER CHEST: Bibasilar consolidation with tiny amount of pleural fluid. Cardiac enlargement.     HEPATOBILIARY: Calcified gallstones.     PANCREAS: Normal.     SPLEEN: Normal.     ADRENAL GLANDS: Stable mild nodular enlargement of the left adrenal gland.     KIDNEYS/BLADDER: Stable nonobstructing renal stones.     BOWEL: Nasogastric tube has been removed and replaced with a feeding tube with tip at the gastroduodenal junction.     LYMPH NODES: Normal.     VASCULATURE: Unremarkable.     PELVIC ORGANS: Lipid rich fibroid. Anasarca with ill-defined fluid in the subcutaneous tissues. Bobo catheter in the urinary bladder. Perirectal soft tissue haziness is stable. Bilateral sidewall lymphadenopathy unchanged.     MUSCULOSKELETAL: Surgical changes in the lumbar spine.                                                                      IMPRESSION:   1.  No significant change. There is stable pelvic sidewall lymphadenopathy.     2.  Bibasilar pulmonary consolidation with trace pleural effusions.     3.  Nonobstructing renal stones.     4.  Nasogastric tube has been removed and replaced with a feeding tube with tip at the duodenojejunal junction.     5.  Postsurgical changes in the lumbar spine. Chronic displaced fracture of the right femoral neck.     6.  Cholelithiasis.     7.  Lipid rich fibroid in the uterus.      12/23 Ct chest  abdomen pelvis  EXAM: CT CHEST ABDOMEN PELVIS W/O CONTRAST  LOCATION: [facility]  DATE/TIME: 12/23/2021 10:21 PM     INDICATION: Sepsis  COMPARISON: 03/16/2019  TECHNIQUE: CT scan of the chest, abdomen, and pelvis was performed without IV contrast. Multiplanar reformats were obtained. Dose reduction techniques were used.   CONTRAST: None.     FINDINGS:   LUNGS AND PLEURA: Mosaic attenuation. Bibasilar atelectasis or infiltrate. Trace pleural effusions.     MEDIASTINUM/AXILLAE: No adenopathy or pericardial effusion. Endotracheal and enteric tube.     CORONARY ARTERY CALCIFICATION: Mild-to-moderate.     HEPATOBILIARY: Cholelithiasis.     PANCREAS: Normal.     SPLEEN: Normal.     ADRENAL GLANDS: Thickening of the adrenal glands.     KIDNEYS/BLADDER: Multiple, bilateral renal calculi. Larger on the left 7 mm. Small amount of air in the left renal collecting system. Bobo within the bladder.     BOWEL: Normal caliber.     LYMPH NODES: Bilateral hypodensity along the pelvic sidewall. On the right 4.7 x 2.3 cm series 4 image 256 and on the left 3.9 x 1.7 cm image 262.     VASCULATURE: Atherosclerotic vascular calcification.     PELVIC ORGANS: 1.6 cm fatty lesion within the uterus.     MUSCULOSKELETAL: Postsurgical change lumbar spine. Chronic fracture right proximal femur. Degenerative change osseous structures. Implanted device right ventral abdomen.                                                                      IMPRESSION:  1.  Bibasilar atelectasis or infiltrate and trace pleural effusions.  2.  Small amount of air in the left renal collecting system. Was there recent instrumentation? Correlate for emphysematous pyelitis.  3.  Cholelithiasis. Gallbladder wall thickening not excluded.  4.  Trace amount of free fluid.  5.  Bilateral renal calculi.  6.  1.6 cm fatty lesion within the uterus.  7.  Bilateral oval densities along the pelvic sidewall. Uncertain if these are chronic fluid  collection/seroma. Possibility of adenopathy not excluded. Follow-up recommended.     12/23 TTE  Interpretation Summary     1. Normal left ventricular size and function. Left ventricular ejection  fraction of 60-65%. No segmental wall motion abnormalities noted.  2. The right ventricle is mildly dilated. The right ventricular systolic  function is normal.  3. Valves not well assessed on this technically difficult study.  Compared to the previous echocardiogram on 2/1/2019, there are no significant  changes. Technically very difficult study.

## 2022-01-09 NOTE — PHARMACY-VANCOMYCIN DOSING SERVICE
"Pharmacy Vancomycin Note  Date of Service 2022  Patient's  1965   56 year old, female    Indication: Aspiration Pneumonia  Day of Therapy: 2  Current vancomycin regimen:  Changed to intermittent dosing based on levels  Current vancomycin monitoring method: Trough (Method 1 = dosing nomogram) (due to rise in SCr)  Current vancomycin therapeutic monitoring goal: 15-20 mg/L       Current estimated CrCl = Estimated Creatinine Clearance: 51.4 mL/min (A) (based on SCr of 1.59 mg/dL (H)).    Creatinine for last 3 days  2022:  6:28 AM Creatinine 0.53 mg/dL  2022:  1:47 AM Creatinine 0.52 mg/dL;  6:52 AM Creatinine 0.64 mg/dL  2022:  6:03 AM Creatinine 1.59 mg/dL    Recent Vancomycin Levels (past 3 days)  2022:  9:35 AM Vancomycin 29.0 mg/L    Vancomycin IV Administrations (past 72 hours)                   vancomycin 1250 mg in 0.9% NaCl 250 mL intermittent infusion 1,250 mg (mg) 1,250 mg New Bag 22 2133    vancomycin 2500 mg in 0.9% NaCl 500 ml intermittent infusion 2,500 mg (mg) 2,500 mg New Bag 22 0925                Nephrotoxins and other renal medications (From now, onward)    Start     Dose/Rate Route Frequency Ordered Stop    22 1100  vasopressin 0.2 units/mL in NS (PITRESSIN) standard conc infusion         2.4 Units/hr  12 mL/hr  Intravenous CONTINUOUS 22 1052      22 0809  vancomycin place kim - receiving intermittent dosing         1 each Does not apply SEE ADMIN INSTRUCTIONS 22 0810      22 0530  norepinephrine (LEVOPHED) 4 mg in  mL infusion PREMIX         0.01-0.6 mcg/kg/min × 121 kg  4.5-272.3 mL/hr  Intravenous CONTINUOUS 22 0508      22 2100  piperacillin-tazobactam (ZOSYN) 4.5 g vial to attach to  mL bag        Note to Pharmacy: For [facility], [facility] and [facility]: For Zosyn-naive patients, use the \"Zosyn initial dose + extended infusion\" order panel.    4.5 g  over 30 Minutes Intravenous EVERY 6 HOURS 221      "          Contrast Orders - past 72 hours (72h ago, onward)            None          Interpretation of levels and current regimen:  Vancomycin level is reflective of supratherapeutic level    Has serum creatinine changed greater than 50% in last 72 hours: Yes    Urine output:  diminished urine output    Renal Function: Worsening      Plan:  1. continue intermittent dosing, will get a level at 2100 tonight  2. Vancomycin monitoring method: Trough (Method 1 = dosing nomogram)  3. Vancomycin therapeutic monitoring goal: 15-20 mg/L  4. Pharmacy will check vancomycin levels as appropriate in 1-3 Days.  5. Serum creatinine levels will be ordered daily for the first week of therapy and at least twice weekly for subsequent weeks.    Dariel Helm, [facility]

## 2022-01-09 NOTE — CONSULTS
[facility]    Nephrology Consultation     Date of Admission:  12/23/2021    Assessment & Plan     Amanda Richardson is a 56 year old female who was admitted on 12/23/2021.     1) First Episode Septic Shock:  Due to enterococcal bacteremia.  Presumed urinary source.  Complicated by JULIANNE, shock liver, hypoxic respiratory failure.  Improved.    2) Second (Current) Episode Septic Shock:  Appears due to pneumonia or even empyema.  On two pressors and making minimal urine.      3) JULIANNE:  Due to #2.  She is also on vancomycin and Zosyn.  IV contrast given with CT scan today.  Presume ATN even though FENA 0.1%.      4) Hypoxic Respiratory Failure    5) Large L Pleural effusion - empyema not excluded.      Suggest:      Volume as able.  No indication for RRT at present.  Expect she may be a candidate for CRRT in a day or two.      Xander May MD  [facility] - Nephrology  [phone]    Reason for Consult     I was asked to see the patient for JULIANNE.      Primary Care Physician     Julius Hassan    Chief Complaint     JULIANNE    History is obtained from chart review.      History of Present Illness     Amanda Richardson is a 56 year old female who presents JULIANNE.    She has had a long hospital course notable for:    Admission 12/23/2021 after presenting with altered mental status.    She had septic shock from enterococcus faecalis that was thought to be of urinary source.  She did have air in the collecting system of L kidney, though urine culture did not grow enterococcus.  It did grow a number of other organisms - four different GNRs and a few yeast.  This was collected after antibiotic had been given.      She had multiple complications including:    Respiratory Failure requiring mechanical ventilation.  AFIB c RVR  JULIANNE - Cr peaked at 3.14 and fell to normal - 0.5-0.6.    Shock Liver.    DVT R internal jugular v    She weaned from pressors and was extubated 12/30/21.  Transferred out of ICU  12/31.    She decompensated 1/8/22 with hypoxic respiratory failure and large L effusion.    Her L chest is now completely full of effusion with some loculated areas.      She has now also developed septic shock once again requiring vasopressin and NE.      And also JULIANNE with oliguria.  Cr up to 1.99    On Vanco/Zosyn.  IV contrast just given.      She is chronically ill with MS, immobility, R prox femur fracture.        Past Medical History   I have reviewed this patient's medical history and updated it with pertinent information if needed.   Past Medical History:   Diagnosis Date     Abnormality of gait 07/27/2012     Arrhythmia     with sepsis     Chronic pain     [facility] - yearly, next in summer 2018     CKD (chronic kidney disease) stage 1, GFR 90 ml/min or greater     kidney stones     Colon polyps 01/2015    tubular adenomas x 2     Depressive disorder      Gallstone 06/11/2012     Hyperlipidemia LDL goal <70      Hypertension goal BP (blood pressure) < 140/90      Leukocytosis 06/11/2012     Moderate depressive episode (H)      MS (multiple sclerosis) (H) 2003    Dr Vigil/Gaby - [facility]     Multiple sclerosis (H)      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  Past Surgical History:   Procedure Laterality Date     COLONOSCOPY N/A 1/7/2015    tubular adenomas x 2 - due 5 yrs     COMBINED CYSTOSCOPY, RETROGRADES, URETEROSCOPY, INSERT STENT Left 1/30/2019    Procedure: 1. Cystoscopy 2. LEFT retrograde pyelogram 3. LEFT JJ stent placement 4. <1hr physician fluoroscopy time;  Surgeon: Epifanio Sapp MD;  Location: [facility] OR     COMBINED CYSTOSCOPY, RETROGRADES, URETEROSCOPY, LASER HOLMIUM LITHOTRIPSY URETER(S), INSERT STENT Left 3/15/2019    Procedure: Cystoscopy, left ureteral stent exchange, left retrograde pyelogram, interpretation of fluoroscopic images, left ureteroscopy with holmium lithotripsy and stone basketing, 22 modifier for difficult lengthy case.;  Surgeon: Mayito Chauhan MD;  Location: [facility] OR     CYSTOSCOPY       CYSTOSCOPY, REMOVE STENT(S), COMBINED Left 3/27/2019    Procedure: Flexible cystoscopy with left ureteral stent removal;  Surgeon: Mayito Chauhan MD;  Location: [facility] OR     FUSION LUMBAR ANTERIOR, FUSION LUMBAR POSTERIOR TWO LEVELS, COMBINED  10/17/2013    lumbar fusion - Dr Floyd     INSERT PUMP BACLOFEN  04/2017    intrathecal baclofen pump implantation     IRRIGATION AND DEBRIDEMENT LOWER EXTREMITY, COMBINED Right 2015    Right ankle I&D d/t infection     OPEN REDUCTION INTERNAL FIXATION ANKLE  5/15    Right Bimalleolar ankle fx ORIF     OPEN REDUCTION INTERNAL FIXATION ANKLE Right 11/2015    Revision due to spasms pulling screws out of ankle     SINUS SURGERY  2011    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     XR LUMBAR EPIDURAL INJECTION INCL IMAGING  3/14    Left L4-5 Epidural Dr Winter       Prior to Admission Medications   Prior to Admission Medications   Prescriptions Last Dose Informant Patient Reported? Taking?   B  Complex Vitamins (B COMPLEX PO) Unknown at Unknown time Spouse/Significant Other Yes Yes   Sig: Take 1 tablet by mouth daily   CHLORPHENIRAMINE-ACETAMINOPHEN PO Unknown at Unknown time Spouse/Significant Other Yes Yes   Sig: Take 2 tablets by mouth Tablet contains acetaminophen 500 mg, dextromethorphan 15 mg, chlorpheniramine 2 mg   Chlorpheniramine-DM (COUGH & COLD HBP) 4-30 MG TABS Unknown at Unknown time Spouse/Significant Other Yes Yes   Sig: Take 1 tablet by mouth every 6 hours as needed   DULoxetine (CYMBALTA) 60 MG capsule Unknown at Unknown time Spouse/Significant Other No Yes   Sig: Take 1 capsule (60 mg) by mouth 2 times daily   Hesperidin-Diosmin 250-650 MG TABS Unknown at Unknown time Spouse/Significant Other No Yes   Si tabs daily per wound care   Multiple Vitamin (MULTI-VITAMIN PO) Unknown at Unknown time Spouse/Significant Other Yes Yes   Sig: Take 1 tablet by mouth daily    Vitamin D3 (CHOLECALCIFEROL) 125 MCG (5000 UT) tablet Unknown at Unknown time Spouse/Significant Other Yes Yes   Sig: Take 2 tablets by mouth every morning   acetaminophen (TYLENOL) 325 MG tablet Unknown at Unknown time Spouse/Significant Other No Yes   Sig: Take 2 tablets (650 mg) by mouth every 4 hours as needed for mild pain   amitriptyline (ELAVIL) 100 MG tablet Not Taking at Unknown time Spouse/Significant Other No No   Sig: Take 1 tablet (100 mg) by mouth At Bedtime   Patient not taking: Reported on 2021   aspirin (ASA) 81 MG chewable tablet Unknown at Unknown time Spouse/Significant Other Yes Yes   Sig: Take 162 mg by mouth every morning    atorvastatin (LIPITOR) 10 MG tablet Unknown at Unknown time Spouse/Significant Other No Yes   Sig: Take 1 tablet (10 mg) by mouth daily   baclofen (LIORESAL) 10 MG tablet Unknown at Unknown time Spouse/Significant Other Yes Yes   Sig: 10 mg by Per G Tube route 3 times daily as needed for muscle spasms In addition to pump   baclofen (LIORESAL) intraTHECAL Internal Pump  2021 at Unknown time Spouse/Significant Other Yes Yes   Sig: by Intrathecal route continuous prn (467.6 mcg/24h simple continuous infusion) Dr. [facility] manages, pump refill due 2022. Settings last updated 2021.   blood glucose (ACCU-CHEK KEL) test strip   No No   Sig: Use to test blood sugar 1 times daily or as directed.   blood glucose (NO BRAND SPECIFIED) lancets standard   No No   Sig: Use to test blood sugar 1 times daily or as directed.   blood glucose calibration (NO BRAND SPECIFIED) solution   No No   Sig: Use to calibrate blood glucose monitor as needed as directed.   blood glucose monitoring (ACCU-CHEK KEL PLUS) meter device kit   No No   Sig: Use to test blood sugar 1 times daily or as directed.   blood glucose monitoring (ACCU-CHEK MULTICLIX) lancets   No No   Sig: Use to test blood sugar 1 times daily or as directed.   doxycycline hyclate (VIBRA-TABS) 100 MG tablet 2021 at 15 tablets remain in Rx bottle Spouse/Significant Other No Yes   Sig: Take 1 tablet (100 mg) by mouth 2 times daily for 10 days   ibuprofen (ADVIL/MOTRIN) 200 MG tablet Unknown at Unknown time Spouse/Significant Other Yes Yes   Sig: Take 200 mg by mouth every 4 hours as needed for mild pain   losartan-hydrochlorothiazide (HYZAAR) 50-12.5 MG tablet Unknown at Unknown time Spouse/Significant Other No Yes   Sig: Take 1 tablet by mouth daily   metFORMIN (GLUCOPHAGE) 500 MG tablet Unknown at Unknown time Spouse/Significant Other No Yes   Si Tabs QAM and 2 Tabs QPM   metoprolol succinate ER (TOPROL-XL) 50 MG 24 hr tablet Unknown at Unknown time Spouse/Significant Other No Yes   Sig: Take 1 tablet (50 mg) by mouth daily   naloxone (NARCAN) 4 MG/0.1ML nasal spray Unknown at spouse confirmed pt has home supply Spouse/Significant Other No Yes   Sig: Spray 1 spray (4 mg) into one nostril alternating nostrils once as needed for opioid reversal Every 2-3 minutes until patient responsive or EMS arrives    omega-3 fatty acids (FISH OIL) 1200 MG capsule Unknown at Unknown time Spouse/Significant Other Yes Yes   Sig: Take 1 capsule by mouth daily.   oxyCODONE IR (ROXICODONE) 15 MG tablet Unknown at Unknown time Spouse/Significant Other No Yes   Sig: Take 1 tablet (15 mg) by mouth every 6 hours as needed for moderate to severe pain Limit 4 tablet(s) per day.   senna-docusate (SENOKOT-S/PERICOLACE) 8.6-50 MG tablet Unknown at Unknown time Spouse/Significant Other No Yes   Sig: Take 2 tablets by mouth daily as needed for constipation      Facility-Administered Medications: None     Allergies   Allergies   Allergen Reactions     Lisinopril Angioedema     Lip swelling     Flexeril [Cyclobenzaprine Hcl] Other (See Comments)     confusion       Social History   I have reviewed this patient's social history and updated it with pertinent information if needed. Amanda Richardson  reports that she quit smoking about 8 years ago. Her smoking use included cigarettes. She has a 15.50 pack-year smoking history. She has never used smokeless tobacco. She reports that she does not drink alcohol and does not use drugs.    Family History   I have reviewed this patient's family history and updated it with pertinent information if needed.   Family History   Problem Relation Age of Onset     C.A.D. Father         with CHF      Cancer Father         ? unsure type - abdominal      Hypertension Mother      Thyroid Disease Mother         goiter      Breast Cancer Sister 58     Thyroid Disease Son      Cancer Paternal Grandfather      Cancer - colorectal No family hx of        Review of Systems   The 10 point Review of Systems could not be done.       Physical Exam   Temp: 100.3  F (37.9  C) Temp src: Axillary BP: (!) 114/38 Pulse: 105   Resp: 20 SpO2: 97 % O2 Device: Mechanical Ventilator    Vital Signs with Ranges  Temp:  [99.8  F (37.7  C)-101.3  F (38.5  C)] 100.3  F (37.9  C)  Pulse:  [] 105  Resp:  [0-20] 20  BP: (114-141)/(38-61)  114/38  FiO2 (%):  [70 %] 70 %  SpO2:  [94 %-99 %] 97 %  246 lbs .53 oz    GENERAL: acutely and chronically ill appearing, intubated  HEENT:  Normocephalic. No gross abnormalities.  Pupils equal.  CV: tachy, no murmurs, no clicks, gallops, or rubs, woody edema to BLE thighs  RESP: bronchial breath sounds L chest, vent sounds R chest.    GI: Abdomen overweight, BD reduced, non tender.   MUSCULOSKELETAL: extremities nl - no gross deformities noted  SKIN: erythematous rash in groin suggesting candida  NEURO:  Sedated  PSYCH: unable.    LYMPH: No palpable ant/post cervical and supraclavicular adenopathy    Data   BMP  Recent Labs   Lab 01/09/22  1407 01/09/22  1231 01/09/22  0801 01/09/22  0603 01/08/22  1200 01/08/22  1143 01/08/22  0834 01/08/22  0652 01/08/22  0639 01/08/22  0147     --   --  143  --   --   --  144  --  144   POTASSIUM 5.2  --   --  4.7  --  3.8  --  4.4  4.4  --  3.9   CHLORIDE 104  --   --  104  --   --   --  103  --  103   MARY 7.8*  --   --  7.9*  --   --   --  8.2*  --  8.3*   CO2 33*  --   --  34*  --   --   --  42*  --  43*   BUN 36*  --   --  34*  --   --   --  23  --  23   CR 1.99*  --   --  1.59*  --   --   --  0.64  --  0.52   * 142* 146* 149*   < >  --    < > 131*   < > 164*    < > = values in this interval not displayed.     [email or web address](phos:4)  CBC)  Recent Labs   Lab 01/09/22  0603 01/08/22  0652 01/07/22  1347 01/07/22  0628   WBC 21.3* 15.3* 15.3* 15.3*   HGB 8.4* 9.3* 10.4* 10.7*   HCT 31.5* 36.1 39.0 40.1   MCV 83 85 83 81    311 299 309     Recent Labs   Lab 01/09/22  0603   AST 25   ALT 42   ALKPHOS 112   BILITOTAL 1.3

## 2022-01-09 NOTE — PHARMACY-VANCOMYCIN DOSING SERVICE
Vancomycin Update    -SCr 1.59 today  -Will change Vancomycin to intermittent dosing and get a level at 10am, dose based on levels.  -Ordered SCr for 1-10.     Dariel Helm, [facility]  January 9, 2022

## 2022-01-09 NOTE — PROGRESS NOTES
[facility] ICU PROGRESS NOTE  January 9, 2022      CO-MORBIDITIES:   Septic shock (H)  Acute hypoxemic respiratory failure (H)  NSTEMI (non-ST elevated myocardial infarction) (H)  Acute renal failure, unspecified acute renal failure type (H)  Hyperkalemia  Anuria  Emphysematous pyelitis  Shock liver  Encephalopathy  Muscle spasm  Decubitus ulcer of left leg    ASSESSMENT:   Amanda Richardson is a 56 year old female with history of MS with baclofen pump in place, neurogenic bladder, recurrent UTIs, chronic pain syndrome, DM II, hypertension, hyperlipidemia, CKD, Non-hodgkin's lymphoma, Obesity who was admitted on 12/23/21 after presenting to ED with altered mental status. Patient was intubated, treated for septic shock due to UTI, blood Cx (+) E fecalis. She was extubated 12/30/21, transferred to floor 12/31/21.   She had progressive hypoxia, underwent left therapeutic thoracentesis (500 ml). RRT 1/8/22 for severe hypoxia prompting intubation and ICU transfer.     Changes today:  - Supinate  - CXR  - POCUS  - 500 ml LR x 2  - Decrease free water  - FENA, urinalysis  - BC x 2  - Check lactate, hepatic panel  - Renal US  - Start vasopressin  - Start stress dose steroids  - Broaden antibiotics      PLAN:    Neuro/ pain/ sedation:  #Acute pain  #Sedation for ventilator compliance  #MS, s/p baclofen pump  #Acute metabolic encephalopathy  #Depression  - Monitor neurological status. Notify the MD for any acute changes in exam.  - Pain:Fentanyl infusion, oxycodone PRN, Fentanyl IV PRN.   - Sedation: Propofol and Midazolam, PRN Midazolam   - Encephalopathy documented in prior hospitalist notes prior to intubation  - RAAS -4 with anticipation of need to reprone  - Cymbalta on hold given inability to crush.     Pulmonary care:   #Acute hypoxemic hypercarbic respiratory failure, intubated 1/8  #HCAP  #Left pleural effusion, s/p thoracentesis 1/5  #Mucous plugging, s/p bronchoscopy 1/8  - AC 20/400/16/70  - VT 6 ml/kg  of IBW. Plateau 27, peak 32. Synchronous.   - Continue full strength veletri  - Proned 1/8 PF improved from 63-->200.  this am. Plan to supinate at 1000.   - 1/8 s/p emergent bronchoscopy for mucous plugging. Post bronchoscopy CXR with improved aeration of left lung.   - Continue mucolytic therapy with Mucomyst and Albuterol  - CPT  - CXR this am  - POCUS: Bilateral sliding signs x 3, 1-2 B lines     Cardiovascular:    #Septic shock  #Paroxysmal atrial fibrillation  #CHF  #Hyperlipidemia  - Monitor hemodynamic status.   - Norepinephrine to keep MAP > 65, escalating doses overnight. Add vasopressin at straight rate and stress dose steroids.   - Continue PO Amiodarone. Currently in SR.   - Continue 81 mg Aspirin  - 1/1 ECHO Normal LV function and seize. EF 60-65%. RV mildly dilated.   - When supine, will plan for POCUS  - POCUS: small pericardial effusion without compressive physiology. RV appears dilated as seen in prior ECHO. EF looks grossly normal but exam was somewhat limited by patient habitus. I was not able to obtain a four chamber view. IVC 1.5 cm with respiratory variation (in the setting of PEEP of 16)  - PPV 28%  - Give 500 ml more LR now.     GI care:   #Severe protein calorie malnutrition  #Cholelithiasis   - PPI  - Continue TF at trophic rate only, do not advance in the setting of increasing vasopressor support.   - Bowel regimen, last BM 1/8  -  Cholelithiasis present without cholecystitis in CT A/P 1/3. Repeat hepatic panel in the setting of fever, increasing pressor requirements and leukocytosis.   - POCUS: Multiple gallbladder stones visualized.     Renal/ Fluid Balance:    #JULIANNE   - Creatinine up 0.64-->1.59, bicarb 34, K normal. Phos up 2.8-->4.8. Oliguric overnight, received 500 ml LR x 1. Repeat now.   - Obtain FeNA, urinalysis  - 1/3 CT A/P with non obstructing renal stones. Will order renal US for follow up, r/o hydronephrosis  - Bedside fluid assessment when supine  - High  intensity  electrolyte replacement protocols.  - 8 kg down from admission weight, diuresed prior to ICU transfer for ongoing hypoxia. Now 5 L up over last 24 hours given high obligate ins, still total net negative 500.   - Will follow strict I&Os  - Repeat BMP this afternoon  - Reduce free water    Endocrine:    #DM II  - Sliding scale for diabetes management    ID/ Antibiotics:  #HCAP  # Enterococcus faecalis bacteremia 2/2 urinary source- treated   - Antibiotics: On Pip tazo (1/6-), Vancomcyn (1/8--). Broaden to Meropenem given worsening shock, fever, rising leukocytosis  - MRSA swab positive  - Add BC x 2, UA with reflex  - Cultures pending: BAL.   - COVID-19 PCR negative. 1/5 pleural fluid culture negative.   - WBC rising, T max 101.3.   - Completed E fecalis bacteremia treatment 1/7    Heme:   #RIJ DVT  #LIJ DVT  #Anemia of chronic illness  #Anemia of critical illness  - Continue heparin infusion for RIJ DVT   - L  internal jugular DVT noted on beside US 1/8  - Hemoglobin 8.4. Platelet count normal    Lines/ tubes/ drains:  - RIJ TLC, L axillary arterial line, ETT, [place], [place].     Disposition:  ICU  Full code      Patient seen, findings and plan discussed with surgical ICU staff, Dr. Jung.  Critical care time: 65 minutes exclusive of procedures    [facility]    Clinically Significant Risk Factors Present on Admission                       ====================================    SUBJECTIVE:   Intubated, sedated, remained prone.    OBJECTIVE:   1. VITAL SIGNS:   Temp:  [99.2  F (37.3  C)-101.3  F (38.5  C)] 99.9  F (37.7  C)  Pulse:  [] 100  Resp:  [0-25] 20  BP: (101-141)/(38-61) 114/38  FiO2 (%):  [70 %-90 %] 70 %  SpO2:  [92 %-100 %] 98 %  Ventilation Mode: CMV/AC  (Continuous Mandatory Ventilation/ Assist Control)  FiO2 (%): 70 %  Rate Set (breaths/minute): 20 breaths/min  Tidal Volume Set (mL): 4000 mL  PEEP (cm H2O): 16 cmH2O  Oxygen Concentration (%): 65 %  Resp: 20      2. INTAKE/ OUTPUT:   I/O last  3 completed shifts:  In: 6316.29 [I.V.:5231.29; NG/GT:505; IV Piggyback:500]  Out: 895 [Urine:895]    3. PHYSICAL EXAMINATION:     GEN: obese critically ill female.   EYES: PERRL, Anicteric sclera.   HEENT:  Normocephalic, atraumatic, trachea midline, ETT secure  CV: proned. Extremities warm and well perfused.   PULM/CHEST: diminished breath sounds bilaterally without rhonchi, crackles or wheeze  GI: proned  : ramos catheter in place, urine yellow and clear  EXTREMITIES: lower extremities wrapped, lymphedema,  moving lower extremities, peripheral pulses intact  NEURO: pupils equal and reactive, unresponsive, sedated.   SKIN: yeast in intertriginous areas, multiple areas of ecchymosis over arms and wrists. Coccyx pressure would.   PSYCH:  sedated  Imaging personally reviewed: as above  ECG  4. INVESTIGATIONS:   Arterial Blood Gases   Recent Labs   Lab 01/09/22  0602 01/08/22  1616 01/08/22  1410 01/08/22  0828   PH 7.34* 7.34* 7.48* 7.49*  7.49*   PCO2 63* 78* 56* 57*  57*   PO2 135* 166* 203* 51*  51*   HCO3 34* 42* 42* 44*  44*     Complete Blood Count   Recent Labs   Lab 01/09/22  0603 01/08/22  0652 01/07/22  1347 01/07/22  0628   WBC 21.3* 15.3* 15.3* 15.3*   HGB 8.4* 9.3* 10.4* 10.7*    311 299 309     Basic Metabolic Panel  Recent Labs   Lab 01/09/22  0801 01/09/22  0603 01/09/22  0423 01/09/22  0038 01/08/22  1200 01/08/22  1143 01/08/22  0834 01/08/22  0652 01/08/22  0639 01/08/22  0147 01/07/22  0857 01/07/22  0628   NA  --  143  --   --   --   --   --  144  --  144  --  144   POTASSIUM  --  4.7  --   --   --  3.8  --  4.4  4.4  --  3.9  --  3.9   CHLORIDE  --  104  --   --   --   --   --  103  --  103  --  104   CO2  --  34*  --   --   --   --   --  42*  --  43*  --  41*   BUN  --  34*  --   --   --   --   --  23  --  23  --  19   CR  --  1.59*  --   --   --   --   --  0.64  --  0.52  --  0.53   * 149* 142* 150*   < >  --    < > 131*   < > 164*   < > 162*    < > = values in this  interval not displayed.     Liver Function Tests  Recent Labs   Lab 01/08/22  0147 01/07/22  0628 01/06/22  0621 01/05/22  0640   AST 20 23 28 32   ALT 60* 70* 93* 109*   ALKPHOS 123 121 135 130   BILITOTAL 0.6 0.5 0.5 0.5   ALBUMIN 2.0* 2.0* 2.1* 2.0*     Pancreatic Enzymes  No lab results found in last 7 days.  Coagulation Profile  No lab results found in last 7 days.      5. RADIOLOGY:   Recent Results (from the past 24 hour(s))   XR Abdomen Port 1 View    Narrative    EXAM: XR ABDOMEN PORT 1 VIEWS  LOCATION: [facility]  DATE/TIME: 1/8/2022 11:16 AM    INDICATION: Check OG tube placement.  COMPARISON: CT 1/3/2022      Impression    IMPRESSION: Orogastric tube tip and side port are in the gastric fundus. Nasoenteric feeding tube tip extends postpyloric and is not included on exam. Moderate left effusion and left basilar consolidation redemonstrated.    XR Chest Port 1 View    Narrative    EXAM: XR CHEST PORT 1 VIEW  LOCATION: [facility]  DATE/TIME: 1/8/2022 11:58 AM    INDICATION: Check ET tube position, status post repositioning.  COMPARISON: 1/8/2022 and 0557 hours      Impression    IMPRESSION: Endotracheal tube is 5 cm above the ron. Nasoenteric feeding tube and orogastric tube tip below diaphragm. Right IJ central line tip at cavoatrial junction. Heart size magnified in AP projection. Moderate left pleural effusion and left   basilar consolidation unchanged. Improved aeration medial right lung base. No pneumothorax.       =========================================

## 2022-01-09 NOTE — PROGRESS NOTES
[facility] ICU RESPIRATORY NOTE        Date of Admission: 12/23/2021    Date of Intubation (most recent): 1/8/2022    Reason for Mechanical Ventilation: Resp. failure    Number of Days on Mechanical Ventilation: 2    Met Criteria for Spontaneous Breathing Trial: No    Reason for No Spontaneous Breathing Trial: PT on Full strength Veletri, PEEP >8    Significant Events Today: Pt supined at 10:30 am, Bronchoscopy done, Transport to CT of chest/abdomen, pelvis.    ABG Results:   Recent Labs   Lab 01/09/22  1408 01/09/22  0602 01/08/22  1616 01/08/22  1410   PH 7.32* 7.34* 7.34* 7.48*   PCO2 69* 63* 78* 56*   PO2 70* 135* 166* 203*   HCO3 35* 34* 42* 42*   O2PER 70 70 80 100       Current Vent Settings: Ventilation Mode: CMV/AC  (Continuous Mandatory Ventilation/ Assist Control)  FiO2 (%): 70 %  Rate Set (breaths/minute): 24 breaths/min  Tidal Volume Set (mL): 400 mL  PEEP (cm H2O): 16 cmH2O  Oxygen Concentration (%): 70 %  Resp: 20      Skin Assessment: When pt was supine she was very edematous.   Plan: Plan to keep supine for at least 6 hours and keep patient on full vent support.    Silva Walker, RRT

## 2022-01-09 NOTE — PROCEDURES
ICU BEDSIDE PROCEDURE   Bronchoscopy Procedure Note - 1/9/2022   Amanda Richardson    MR: [id number]      Pre-Procedure Diagnosis:  Acute respiratory failure, near complete opacification of left lung  Post- Procedure Diagnosis: Acute respiratory failure, mucous plugging  Procedure: Flexible bronchoscopy   Specimen: None sent  Premedication: 4 mg Versed, 100 mcg Fentanyl  Procedure Meds: Midazolam, Propofol and Fentanyl infusion  Procedure: Consent was obtained from the patient's spouse. A timeout was called to review the case and patient information. The patient was positioned supine. The patient was hyper oxygenated with 100% FiO2.  An adult flexible bronchoscope was advanced through the adapter on the ETT without difficulty. The ETT was seen approximately 2.5cm above the ron. Bilateral tracheobronchial trees were inspected closely to the level of the subsegmental bronchi.  There were two small plugs suctioned from the ALBAN superior and lingular division.   The procedure was completed and the patient tolerated the procedure well and without complications.  SpO2 98%, Peak pressures unchanged at 37, Vts 400s.   Findings:   1. Two small mucous plugs in the ALBAN superior and lingular division. Scant secretions elsewhere  2. Hyperemic airways.   3. Minimal edema   Complications: No immediate complications   Plan: CT chest today

## 2022-01-09 NOTE — PROCEDURES
"ICU Bedside Procedure    Surgical Chest tube Placement    Indication: Acute respiratory failure, Large left pleural effusion  Date of service: 1/9/2022    Premedications: 100 mcg of Fentanyl IV. Patient sedated with Midazolam, Propofol and Fentanyl infusion.     The patient or patient representative was consented. A time out was performed immediately prior to the procedure and the laterally was confirmed.     CT chest imaging from today was reviewed. Large multiloculated pleural effusion with complete atelectasis of the left lung. ? Hemorrhagic contents on imaging.      The patient was prepped and draped in a sterile fashion, the area of the chest on the left  was instilled with 10 mls of 1% lidocaine. Initially, a small bore catheter insertion was attempted using Seldinger technique, however, after accessing the pleural cavity, passing the wire and dilator with ease, the catheter was too flexible and I could not advance it fully. I converted to a surgical chest tube. The skin incision was extended to 1.5 cm with a #11 blade.  A 20 Sammarinese chest tube was inserted using the open thoracostomy technique.  The tip of the sterile finger was passed into the pleural space and the chest tube was passed into the chest cavity and directed superiorly. The chest tube \"twirled\" easily and was sutured into place with two 0 silk sutures.  The chest tube was placed to - 20 cm of suction. The chest tube site was dressed in sterile fashion. The patient tolerated the procedure well. Immediate return of about 300 ml in addition to 120 ml in atrium of dark bloody output.     CXR reviewed with CT in optimal placement. Moderate reexpansion of left lung.     Complications: None.     Specimen: Fluid sent for analysis cell count with differential, gram stain, aerobic, anaerobic, fungal cultures, AFB, LDH and protein.     Plan: CXR in am.     "

## 2022-01-09 NOTE — PLAN OF CARE
Neuro:  Pt withdraws to painful stimulation. Sedated with propofol/versed. RASS=-4.  Fever/Pain: Low grade fever. Fentanyl gtt for pain.   CV:  SR/ST. MAP>65 maintained with levo.   Pulm: Lung sounds clear/diminished. Small thick white blood-tinged respiratory secretions. Proned at 1230. Remains on full ventilator support @70%/20/480/16.  : Bobo for strict I&Os/deep sedation, low urine output.   GI: Hypoactive bowel sounds. NPO. Glu levels low 100s, given sliding scale insulin. Last BM 1/4, stool softners given.  Plan: Continue to promote vent synchrony. Family updated by phone on POC.

## 2022-01-10 NOTE — PLAN OF CARE
Neuro:  Sedate on Propofol,versed, and fentanyl gtt, RASS goal adjusted.  Does not follow.  CV: SR, MAP at goal,  Levo and vasopressin continuous.  Transfused to ester of 2 PRBC. Next HG check at midnight.  Resp:  Vented, full strength veletri.  Lungs coarse and diminished on left, moderate to large amount to oral and inline secretion. Chest tube with dark red drainage.  Heparin gtt for DVT.  GI/:   TF trickle feed, Bobo in place  UOP improved after NS bolus and scheduled albumin.Rectal tube In place   Vasc: L PIV x 1, R internal jugular x 3 and L A line  Gtt:  Propofol,versed, fentanyl, heparin, Levo and vasopressin.  Skin:  Numerous areas of improved rash, bruising, excoriation, and peeling treated per order.  Family:  at bed side this evening.

## 2022-01-10 NOTE — PROGRESS NOTES
[facility] ICU PROGRESS NOTE  01/10/2022      Date of Service (when I saw the patient): 01/10/2022    ASSESSMENT:  Amanda Richardson is a 56 year old female with history of MS with baclofen pump in place, neurogenic bladder, recurrent UTIs, chronic pain syndrome, DM II, hypertension, hyperlipidemia, CKD, Non-hodgkin's lymphoma, Obesity who was admitted on 12/23/21 after presenting to ED with altered mental status. Patient was intubated, treated for septic shock due to UTI, blood Cx (+) E fecalis. She was extubated 12/30/21, transferred to floor 12/31/21.   She had progressive hypoxia, underwent left therapeutic thoracentesis (500 ml). RRT 1/8/22 for severe hypoxia prompting intubation and ICU transfer.        CHANGES and MAJOR THINGS TODAY:   Type and screen--> one unit PRBC     - consent in chart   Decrease propofol rate   Decrease RASS goal   Follow ID regarding abx duration.   Pharm D to dose vancomycin pending level.       PLAN:  Neuro/ pain/ sedation:  # Acute pain  # Sedation for ventilator compliance  # MS, s/p baclofen pump  # Acute metabolic encephalopathy  # Depression   - Pain: Fentanyl infusion, oxycodone PRN, Fentanyl IV PRN.   - Sedation: Propofol and Midazolam, PRN Midazolam   - Encephalopathy documented in prior hospitalist notes prior to intubation  - Cymbalta on hold given inability to crush.      Pulmonary care:   # Acute hypoxemic hypercarbic respiratory failure, intubated 1/8  # HCAP  # Left pleural effusion, s/p thoracentesis 1/5  # Mucous plugging, s/p bronchoscopy 1/8 and 1/9   # left hemothorax s/p CT 1/9/22   Ventilation Mode: CMV/AC  (Continuous Mandatory Ventilation/ Assist Control)  FiO2 (%): 70 %  Rate Set (breaths/minute): 20 breaths/min  Tidal Volume Set (mL): 400 mL  PEEP (cm H2O): 16 cmH2O  Oxygen Concentration (%): 50 %  Resp: 24    - VT 6 ml/kg of IBW. Plateau 26  - Continue full strength veletri, wean once able to decrease FIO2 and PEEP.   - Several bronch for mucous plugging    -  Continue mucolytic therapy with Mucomyst and Albuterol and CPT.  - Chest tube in Left chest - daily CXR  - CT of chest 1/9/22 with complete atelectasis of left lung, multiple loculations   - unclear when appear, question if from prior thoracentesis on 1/5/22  - total of 670cc/24hrs, 80 since midnight, bloody serosanguinous drainage.   If no improvement in lung/pulmonary status, may need thoracic consult for VATS     Cardiovascular:    # Septic shock  # Paroxysmal atrial fibrillation  # CHF  # Hyperlipidemia  - Monitor hemodynamic status. Norepinephrine to keep MAP > 65. Vasopressin 2.4units/hr.  -  Stress dose steroids 50 mg every 6 hours   - Continue PO Amiodarone. Lytes replaced.  K>4.0,Mag >2.5  - Continue 81 mg Aspirin  - 1/1 ECHO Normal LV function and seize. EF 60-65%. RV mildly dilated.   - 1/9: normal EF 65-70. left ventricle normal. Left ventricular systolic function is normal. No regional wall motion abnormalities noted. right ventricle is normal in size and function.     GI:   # Severe protein calorie malnutrition  # Cholelithiasis   - PPI  - Continue TF at trophic rate only, do not advance in the setting of increasing vasopressor support.   - Bowel regimen, last BM 1/8  - Cholelithiasis present without cholecystitis in CT A/P 1/3. Repeat hepatic panel in the setting of fever, increasing pressor requirements and leukocytosis.   - CTABP 1/9/22:   -  left pleural effusion with areas of high-density suggesting hemorrhagic contents. Complete atelectasis of the left lung. Small right pleural effusion associated with groundglass opacities and interlobular septal thickening in the right lung could be related to infection or pulmonary edema. Cholelithiasis and Nonobstructing nephrolithiasis.    Renal/ Fluid Balance:    # JULIANNE   # hypervolemia?   - Creatinine up 0.64, now  2.03  - 1/9/22:  FeNA ~ 0.1%  - 1/3 CT A/P with non obstructing renal stones.   - 1/9/21:  US renal: No hydronephrosis, non-obstructing  stones.    - seen by urology 1/5, no acute intervention as stone non-obstructing and no evidence of hydronephrosis of US, follow up as OP for definite stone removal with Dr early.   - renal following for JULIANNE    - await additional recommendations  - 8 kg down from admission weight, diuresed prior to ICU transfer for ongoing hypoxia.  - Will follow strict I&Os. OUP: 1285ML/24 hours     Endocrine:    # DM II  # Relative adrenal insufficiency   - holding  PTA oral agents   - Sliding scale for diabetes management. Goal to keep BG <180-  - continue with solu-cortef 50mg every 6 hours, started 1/9/21--keep for now, wean when off pressors      ID/ Antibiotics:  # HCAP  # Enterococcus faecalis bacteremia 2/2 urinary source- treated - Completed E fecalis bacteremia treatment 1/7  - Antibiotics: On Pip tazo (1/6-), Vancomcyn (1/8--). Broaden to Meropenem given worsening shock, fever, rising leukocytosis  - ID following, await additional recommendations     Culture:   - MRSA swab positive  - COVID-19 PCR negative. 1/5 pleural fluid culture negative.   - Add BC x 2, UA with reflex  - Cultures pending: BAL.        Heme:   # RIJ DVT (1/5)   # LIJ DVT  # superficial thrombus in cephalic vein from mid to proximal forearm  1/5  # Anemia of chronic illness  # Anemia of critical illness  - Vascular surgery consulted 1/5/22. Continue with tx of heparin gtt, plan for 2 weeks total for DVT's.   - Continue heparin infusion for RIJ DVT    - line still in place as functional still   - L  internal jugular DVT noted on beside US 1/8  - hgb 6.9, this am, one unit PRBC ordered      Lines/ tubes/ drains:  - RIJ TLC, L axillary arterial line, ETT, TONI ramos.      Disposition:  - ICU    Time spent on this Encounter   Billing:  I spent 55 minutes bedside and on the inpatient unit today managing the critical care of Amanda Richardson in relation to the issues listed in this note.      Ruiz Gutierrez    ====================================  INTERVAL  HISTORY:  Course reviewed. No acute events overnight. Not able to perform ROS. Pt sedated and intubated.     OBJECTIVE:   1. VITAL SIGNS:   Temp:  [99.2  F (37.3  C)-99.6  F (37.6  C)] 99.3  F (37.4  C)  Pulse:  [] 86  Resp:  [20-29] 24  MAP:  [56 mmHg-188 mmHg] 172 mmHg  Arterial Line BP: ()/() 176/169  SpO2:  [90 %-100 %] 97 %  Ventilation Mode: CMV/AC  (Continuous Mandatory Ventilation/ Assist Control)  FiO2 (%): 70 %  Rate Set (breaths/minute): 20 breaths/min  Tidal Volume Set (mL): 400 mL  PEEP (cm H2O): 16 cmH2O  Oxygen Concentration (%): 50 %  Resp: 24      2. INTAKE/ OUTPUT:   I/O last 3 completed shifts:  In: 4770.36 [I.V.:4015.36; NG/GT:565]  Out: 1955 [Urine:1285; Chest Tube:670]    3. PHYSICAL EXAMINATION:  General: sedated. NAD   HEENT: pupils 3mm and reactive. ETT present and secured. OGT present to LIS--thin bilious output noted.   Neuro: sedated.   Pulm/Resp: BBS, lungs coarse but without wheezes anteriorally. Left chest tube present with bloody output in tubing,  CV: RRR, S1/S2   Abdomen: abdomen soft and compressible. Pannus without TTP.   : (+) ramos catheter in place, urine yellow and clear  Incisions/Skin: reddened groins/fold, chronic skin changes in BLE. Scattered healing ecchymosis in extremities, skin tears dressed.   MSK/Extremities: generalized peripheral edema in all extremities, peripheral pulses intact, calves soft and compressible, extremities well perfused 2+.     4. INVESTIGATIONS:   Arterial Blood Gases   Recent Labs   Lab 01/10/22  0615 01/09/22  1737 01/09/22  1408 01/09/22  0602   PH 7.40 7.35 7.32* 7.34*   PCO2 54* 62* 69* 63*   PO2 114* 116* 70* 135*   HCO3 33* 34* 35* 34*     Complete Blood Count   Recent Labs   Lab 01/10/22  0615 01/09/22 2054 01/09/22  0603 01/08/22  0652 01/07/22  1347   WBC 21.0*  --  21.3* 15.3* 15.3*   HGB 6.9* 7.2* 8.4* 9.3* 10.4*     --  329 311 299     Basic Metabolic Panel  Recent Labs   Lab 01/10/22  0900 01/10/22  0615  01/10/22  0409 01/10/22  0239 01/09/22  2313 01/09/22  2305 01/09/22  2020 01/09/22  1601 01/09/22  1407   NA  --  140  --  139  --   --  141  --  141   POTASSIUM  --  4.6  --  4.7  --  4.8 6.0*  --  5.2   CHLORIDE  --  102  --  103  --   --  105  --  104   CO2  --  31  --  32  --   --  29  --  33*   BUN  --  48*  --  45*  --   --  43*  --  36*   CR  --  2.03*  --  2.08*  --   --  2.08*  --  1.99*   * 177* 179* 179*   < >  --  183*   < > 154*    < > = values in this interval not displayed.     Liver Function Tests  Recent Labs   Lab 01/09/22  1612 01/09/22  0603 01/08/22  0147 01/07/22  0628 01/06/22  0621   AST  --  25 20 23 28   ALT  --  42 60* 70* 93*   ALKPHOS  --  112 123 121 135   BILITOTAL  --  1.3 0.6 0.5 0.5   ALBUMIN  --  1.7* 2.0* 2.0* 2.1*   INR 1.37*  --   --   --   --      Pancreatic Enzymes  No lab results found in last 7 days.  Coagulation Profile  Recent Labs   Lab 01/09/22  1612   INR 1.37*         5. RADIOLOGY:   Recent Results (from the past 24 hour(s))   US Renal Complete    Narrative    EXAM: US RENAL COMPLETE  LOCATION: [facility]  DATE/TIME: 1/9/2022 10:42 AM    INDICATION: Septic shock, now with oliguric JULIANNE. CT 1/3 with non obstructing stones, evaluate for hydronephrosis.  COMPARISON: None available.  TECHNIQUE: Routine Bilateral Renal and Bladder Ultrasound.    FINDINGS: Technically challenging exam due to patient's body habitus and very limited mobility.    RIGHT KIDNEY: Measures 15.5 x 7.0 x 5.2 cm. It demonstrates normal cortical echogenicity and thickness of 1.9 cm. No hydronephrosis or shadowing calculi.     LEFT KIDNEY: Measures 12.5 x 6.1 x 6.1 cm. It demonstrates normal cortical echogenicity and thickness of 1.9 cm. No hydronephrosis or shadowing calculi.     BLADDER: Decompressed with Bobo's catheter.    Incidentally noted gallstones and gallbladder sludge.      Impression    IMPRESSION: Technically challenging exam due to patient's body habitus  and limited mobility.  1.  No hydronephrosis or shadowing calculi in either kidney.  2.  Incidentally noted cholelithiasis and gallbladder sludge.   XR Chest Port 1 View    Narrative    EXAM: XR CHEST PORTABLE 1 VIEW  LOCATION: [facility]  DATE/TIME: 2022, 12:27 PM    INDICATION: Acute respiratory failure, pneumonia.  COMPARISON: 2022.      Impression    IMPRESSION: Endotracheal tube, right internal jugular central venous catheter with tip in the distal SVC, good location, are unchanged. Esophagogastric tube courses below the level of the field of view. Inferior most aspect of both hemithoraces excluded   from field-of-view. Near-complete opacification of the left hemithorax, likely in combination of pleural effusion and consolidation given air bronchograms. A minimal amount of lung is aerated at the left lung apex, worsened since previous exam. No   pneumothorax.     Echocardiogram Limited   Result Value    LVEF  65-70%    Narrative    [id number]  [id number]  [id number]  [id number]^REBECCA IKM^JOSE ALEJANDRO^PINKY     [facility]  Echocardiography Laboratory  [address] [place] [address]     Name: JIMMY SYLVESTER  MRN: [id number]  : 1965  Study Date: 2022 01:39 PM  Age: 56 yrs  Gender: Female  Patient Location: [facility]  Reason For Study: Shock  Ordering Physician: JOSE ALEJANDRO LANGFORD  Referring Physician: Julius Hassan  Performed By: Cecil De La Cruz     BSA: 2.3 m2  Height: 69 in  Weight: 246 lb  HR: 95  BP: 116/50 mmHg  ______________________________________________________________________________  Procedure  Limited Portable Echo Adult. Optison (NDC #[id number]) given intravenously.  ______________________________________________________________________________  Interpretation Summary     The left ventricle is normal in size.  Left ventricular systolic function is normal.  The visual ejection fraction is 65-70%.  No regional wall motion  abnormalities noted.  The right ventricle is normal in size and function.  Trace mitral and tricuspid valve regurgitation.  Right ventricle systolic pressure estimate normal  Dilated inferior vena cava (patient is on a mechanical ventilator).     ______________________________________________________________________________  Left Ventricle  The left ventricle is normal in size. There is normal left ventricular wall  thickness. Left ventricular systolic function is normal. The visual ejection  fraction is 65-70%. No regional wall motion abnormalities noted.     Right Ventricle  The right ventricle is normal in size and function.     Atria  Normal left atrial size. Right atrial size is normal. There is no color  Doppler evidence of an atrial shunt.     Mitral Valve  The mitral valve is not well visualized. There is trace mitral regurgitation.     Tricuspid Valve  The tricuspid valve is not well visualized, but is grossly normal. There is  trace tricuspid regurgitation. The right ventricular systolic pressure is  approximated at 12.1 mmHg plus the right atrial pressure. Right ventricle  systolic pressure estimate normal.     Aortic Valve  The aortic valve is not well visualized. No aortic regurgitation is present.  No aortic stenosis is present.     Pulmonic Valve  The pulmonic valve is not well visualized. This degree of valvular  regurgitation is within normal limits.     Vessels  The aortic root is not well visualized. Dilation of the inferior vena cava is  present with abnormal respiratory variation in diameter.     Pericardium  There is no pericardial effusion.     Rhythm  Sinus rhythm was noted.  ______________________________________________________________________________  MMode/2D Measurements & Calculations  IVSd: 1.1 cm     LVIDd: 4.2 cm  LVIDs: 3.8 cm  LVPWd: 0.96 cm  FS: 8.2 %  LV mass(C)d: 140.1 grams  LV mass(C)dI: 62.1 grams/m2  LA dimension: 2.5 cm  RWT: 0.46     Doppler Measurements & Calculations  TR  max sharon: 173.7 cm/sec  TR max P.1 mmHg     ______________________________________________________________________________  Report approved by: Dr Lupe Hall 2022 04:15 PM         CT Chest/Abdomen/Pelvis w Contrast    Narrative    EXAM: CT CHEST/ABDOMEN/PELVIS WITH CONTRAST  LOCATION: [facility]  DATE/TIME: 2022, 3:06 PM    INDICATION: Sepsis.  COMPARISON: 2021.  TECHNIQUE: CT scan of the chest, abdomen, and pelvis was performed following injection of IV contrast. Multiplanar reformats were obtained. Dose reduction techniques were used.   CONTRAST: 124 mL [id number]-[id number].     FINDINGS:   LUNGS AND PLEURA: Complete atelectasis of the left lung. There is a large pleural effusion with evidence of multiple internal loculations. There is high-density within the pleural fluid in the left hemithorax suggesting pockets of hemorrhage. A small   right pleural effusion is present. There is interlobular septal thickening in the right lung and areas of groundglass opacity.    MEDIASTINUM/AXILLAE: Right internal jugular central line terminates in the SVC. An endotracheal tube is appropriately positioned. A feeding tube extends into the distal duodenum. A nasogastric tube terminates in the stomach. The thyroid gland is   unremarkable. No pathologically enlarged thoracic or axillary lymph nodes. Caliber of the thoracic aorta is within normal limits.    CORONARY ARTERY CALCIFICATION: Mild.    HEPATOBILIARY: Gallstones are present in the gallbladder. No worrisome liver lesions.    PANCREAS: Normal.    SPLEEN: Normal.    ADRENAL GLANDS: Normal.    KIDNEYS/BLADDER: Nonobstructing 7 mm calculus in the right renal collecting system. A few tiny calcifications in the left kidney, decreased from prior. No hydronephrosis or worrisome renal mass. Urinary bladder decompressed by a Bobo catheter.    BOWEL: No bowel obstruction or inflammatory change. Normal appendix. No free air or  intra-abdominal abscess. No ascites.    LYMPH NODES: Normal.    VASCULATURE: Nonaneurysmal aortic atherosclerosis.    PELVIC ORGANS: Fat-containing lesion in the left pelvis measuring 1.8 cm (series 3, image 262) likely a small dermoid.    MUSCULOSKELETAL: Chronic right femoral neck fracture noted. Lumbosacral fusion hardware noted. No destructive bone lesions.      Impression    IMPRESSION:  1.  There is a large, multiloculated, left pleural effusion with areas of high-density suggesting hemorrhagic contents. The possibility of empyema is not excluded.  2.  Complete atelectasis of the left lung.  3.  Small right pleural effusion associated with groundglass opacities and interlobular septal thickening in the right lung could be related to infection or pulmonary edema.  4.  Cholelithiasis.  5.  Nonobstructing nephrolithiasis.     XR Chest Port 1 View    Narrative    EXAM: XR CHEST PORT 1 VIEW  LOCATION: [facility]  DATE/TIME: 1/9/2022 4:52 PM    INDICATION: chest tube placement  COMPARISON: Radiograph and CT of the chest earlier today.      Impression    IMPRESSION: Endotracheal tube terminates at about the level of the clavicular heads 8 cm above the ron. Enteric tube passes below the diaphragm and the inferior margin of the radiograph. Right internal jugular venous catheter tip in the mid SVC. Left   chest tube has been placed which terminates in the mid left pleural space. Left pleural effusion has decreased in volume and is now small to moderate. There is a new small left apical pneumothorax with maximal pleural separation of 12 mm. Stable cardiac   silhouette.   XR Chest Port 1 View    Narrative    EXAM: CHEST SINGLE VIEW PORTABLE  LOCATION: [facility]  DATE/TIME: 1/10/2022 5:40 AM    INDICATION: Respiratory failure. Pneumonia.  COMPARISON: 1/9/2022 at 1650 hours.      Impression    IMPRESSION:   1.  A moderate-sized region of hazy opacities in the inferior  right lung that could relate to atelectasis, an increasing right pleural effusion or pneumonia, new since the recent comparison study.  2.  The left apical pneumothorax described on the comparison study is not clearly visualized on this study.  3.  No other convincing interval change.   4.  Patchy opacities within the mid and lower left lung and probable left pleural effusion again noted.   5.  An endotracheal tube, enteric feeding tube, left pleural drain, and right internal jugular central venous catheter are again noted.        =========================================

## 2022-01-10 NOTE — PROGRESS NOTES
[facility]  Vascular Medicine Progress Note            Assessment and Plan:          Catheter associated right internal jugular as well as right upper extremity cephalic vein superficial thrombosis.     -The patient is symptomatic from the above.  One treatment approach would simply be to remove the offending catheters and undertakes serial imaging.  However, would not suggest this in the current patient given the fact that she is having symptomatic right upper extremity swelling and edema.      -She was also having shortness of breath.  The likelihood of upper extremity embolization to the lungs is extremely low. Would not presently recommended obtaining a PE protocol CT of the chest.     -She has pleural effusions. S/P repeat thoracentesis. Chest tube in place now.     -Eventually, when all procedures are done, she can be transitioned to therapeutic oral anticoagulation in the form of a DOAC.  She is morbidly obese, but is not at a weight that exceeds the upper limit of recommended utilization for DOACs.  Keep on IV UFH gtt until it is clear she will not have any additional procedures.     -No hypercoagulable workup is warranted as these are catheter-associated thrombotic events.      -If able to tolerate anticoagulation, would recommend a minimum of 12 weeks (3 months) of anticoagulation.  We will see the patient in outpatient followup to determine ultimate timing of cessation of anticoagulation.     -As patient has transferred to the ICU and is intubated under the care of the Intensivist service, we will follow peripherally. Once out of the ICU, we can see again and give recommendations on oral anticoagulation.                        Interval History:   Transferred to ICU over weekend and now intubated.               Review of Systems:   Unable to get ROS as she is intubated and sedated.              Medications:       sodium chloride 0.9%  500 mL Intravenous Once     acetylcysteine  1 mL  Nebulization Q6H     albumin human  12.5 g Intravenous Q6H     albuterol  2.5 mg Nebulization Q6H     amiodarone  400 mg Oral or Feeding Tube Daily     aspirin  81 mg Oral or Feeding Tube Daily     chlorhexidine  15 mL Mouth/Throat Q12H     [Held by provider] DULoxetine  60 mg Oral BID     hydrocortisone sodium succinate PF  50 mg Intravenous Q6H     influenza recomb quadrivalent PF  0.5 mL Intramuscular Prior to discharge     insulin aspart  1-6 Units Subcutaneous Q4H     lactobacillus rhamnosus (GG)  1 capsule Oral BID     [Held by provider] losartan-hydrochlorothiazide  1 tablet Oral Daily     meropenem  1 g Intravenous Q12H     [Held by provider] metoprolol tartrate  25 mg Oral or Feeding Tube BID     miconazole   Topical BID     multivitamins w/minerals  15 mL Per Feeding Tube Daily     nystatin   Topical BID     pantoprazole  40 mg Per Feeding Tube QAM AC    Or     pantoprazole (PROTONIX) IV  40 mg Intravenous QAM AC     [Held by provider] polyethylene glycol  17 g Oral or Feeding Tube Daily     protein modular  2 packet Per Feeding Tube TID     sennosides  5 mL Oral or Feeding Tube BID     sodium chloride  3 mL Nebulization Q6H     sodium chloride (PF)  3 mL Intracatheter Q8H     vancomycin place kim - receiving intermittent dosing  1 each Does not apply See Admin Instructions                  Physical Exam:     Vitals were reviewed  Patient Vitals for the past 24 hrs:   Temp Temp src Pulse Resp SpO2 Weight   01/10/22 1545 -- -- 85 23 91 % --   01/10/22 1530 98.8  F (37.1  C) Axillary 85 (!) 98 91 % --   01/10/22 1515 -- -- 87 (!) 38 91 % --   01/10/22 1500 -- -- 86 24 91 % --   01/10/22 1445 -- -- 87 23 91 % --   01/10/22 1430 -- -- 87 24 91 % --   01/10/22 1415 99.6  F (37.6  C) Axillary 90 24 91 % --   01/10/22 1400 -- -- 86 24 91 % --   01/10/22 1345 -- -- 88 24 93 % --   01/10/22 1330 99.7  F (37.6  C) Axillary 85 23 95 % --   01/10/22 1315 -- -- 87 24 98 % --   01/10/22 1300 -- -- 85 24 97 % --    01/10/22 1245 -- -- 87 23 97 % --   01/10/22 1230 100  F (37.8  C) Axillary 86 23 98 % --   01/10/22 1215 100  F (37.8  C) Axillary 87 24 98 % --   01/10/22 1200 -- -- 87 24 98 % --   01/10/22 1145 -- -- 88 24 98 % --   01/10/22 1130 -- -- 87 24 98 % --   01/10/22 1115 -- -- 87 24 98 % --   01/10/22 1100 -- -- 87 24 97 % --   01/10/22 1045 -- -- 87 24 97 % --   01/10/22 1030 -- -- 88 24 96 % --   01/10/22 1015 -- -- 87 24 97 % --   01/10/22 1000 -- -- 89 24 96 % --   01/10/22 0945 -- -- 89 24 96 % --   01/10/22 0930 -- -- 89 24 96 % --   01/10/22 0915 -- -- 89 25 94 % --   01/10/22 0900 99.3  F (37.4  C) Axillary 86 24 97 % --   01/10/22 0845 -- -- 87 23 97 % --   01/10/22 0830 -- -- -- 26 97 % --   01/10/22 0815 -- -- -- 26 97 % --   01/10/22 0800 -- -- -- 26 97 % --   01/10/22 0745 -- -- -- 26 96 % --   01/10/22 0730 -- -- -- 25 100 % --   01/10/22 0715 -- -- -- 25 100 % --   01/10/22 0615 -- -- -- 26 100 % --   01/10/22 0600 -- -- -- 28 100 % --   01/10/22 0545 -- -- -- 29 99 % --   01/10/22 0530 -- -- 78 24 99 % --   01/10/22 0515 -- -- 80 24 99 % --   01/10/22 0500 -- -- 81 24 99 % --   01/10/22 0445 -- -- 80 24 99 % --   01/10/22 0430 99.2  F (37.3  C) Oral 82 25 98 % --   01/10/22 0421 -- -- -- -- -- 130 kg (286 lb 9.6 oz)   01/10/22 0415 -- -- 79 -- 99 % --   01/10/22 0400 -- -- 83 23 100 % --   01/10/22 0345 -- -- 83 24 100 % --   01/10/22 0330 -- -- 78 23 100 % --   01/10/22 0315 -- -- 81 24 100 % --   01/10/22 0300 -- -- 80 24 99 % --   01/10/22 0245 -- -- 82 24 99 % --   01/10/22 0230 -- -- 81 24 99 % --   01/10/22 0215 -- -- -- 24 100 % --   01/10/22 0200 -- -- -- 24 100 % --   01/10/22 0145 -- -- -- 24 99 % --   01/10/22 0130 -- -- -- 24 100 % --   01/10/22 0115 -- -- 85 24 98 % --   01/10/22 0100 -- -- 86 24 98 % --   01/10/22 0045 -- -- 84 27 100 % --   01/10/22 0030 -- -- -- 28 100 % --   01/10/22 0015 -- -- -- 28 100 % --   01/10/22 0000 -- -- -- 26 100 % --   01/09/22 2345 -- -- -- 26 99 % --    01/09/22 2330 -- -- -- 28 99 % --   01/09/22 2315 -- -- -- 24 92 % --   01/09/22 2300 -- -- -- 26 94 % --   01/09/22 2200 -- -- 80 24 95 % --   01/09/22 2145 -- -- 80 24 95 % --   01/09/22 2130 -- -- 81 24 95 % --   01/09/22 2115 -- -- 82 23 94 % --   01/09/22 2100 -- -- 85 24 93 % --   01/09/22 2045 -- -- 84 24 94 % --   01/09/22 2030 -- -- -- 24 95 % --   01/09/22 2015 -- -- 77 24 96 % --   01/09/22 2000 -- -- 77 24 93 % --   01/09/22 1945 -- -- 77 24 97 % --   01/09/22 1930 -- -- 86 24 96 % --   01/09/22 1915 -- -- 82 24 96 % --   01/09/22 1900 -- -- 84 24 96 % --   01/09/22 1830 -- -- 85 24 96 % --   01/09/22 1815 -- -- 85 24 96 % --   01/09/22 1800 -- -- 86 24 95 % --   01/09/22 1745 -- -- 87 24 95 % --   01/09/22 1730 -- -- 87 24 94 % --   01/09/22 1715 -- -- 86 24 95 % --   01/09/22 1700 -- -- 97 -- 94 % --   01/09/22 1645 -- -- 85 24 94 % --   01/09/22 1630 -- -- 82 24 93 % --     Wt Readings from Last 4 Encounters:   01/10/22 130 kg (286 lb 9.6 oz)   01/29/20 114.3 kg (252 lb)   07/29/19 114.3 kg (252 lb)   07/17/19 114.3 kg (252 lb)       Intake/Output Summary (Last 24 hours) at 1/6/2022 1033  Last data filed at 1/6/2022 0600  Gross per 24 hour   Intake 2860 ml   Output 2950 ml   Net -90 ml     Constitutional: intubated/sedated.   Eyes: normal  ENT: normal  Neck: Supple, symmetrical. Right internal jugular central line in place.   Back: normal  Lungs: decreased BS anteriorly. Chest tube in place.   Cardiovascular: Regular rate and rhythm, normal S1 and S2, no S3 or S4, and no murmur noted.  Abdomen: obese.   Musculoskeletal: RUE w/ significant enlargement compared to left; Not tense or taut  Neurologic: Sedated  Neuropsychiatric: Sedated.   Skin: normal           Data:     Results for orders placed or performed during the hospital encounter of 12/23/21 (from the past 24 hour(s))   XR Chest Port 1 View    Narrative    EXAM: XR CHEST PORT 1 VIEW  LOCATION: [facility]  DATE/TIME:  1/9/2022 4:52 PM    INDICATION: chest tube placement  COMPARISON: Radiograph and CT of the chest earlier today.      Impression    IMPRESSION: Endotracheal tube terminates at about the level of the clavicular heads 8 cm above the ron. Enteric tube passes below the diaphragm and the inferior margin of the radiograph. Right internal jugular venous catheter tip in the mid SVC. Left   chest tube has been placed which terminates in the mid left pleural space. Left pleural effusion has decreased in volume and is now small to moderate. There is a new small left apical pneumothorax with maximal pleural separation of 12 mm. Stable cardiac   silhouette.   Cell count with differential fluid    Narrative    The following orders were created for panel order Cell count with differential fluid.  Procedure                               Abnormality         Status                     ---------                               -----------         ------                     Cell Count Body Fluid[[id number]]        Abnormal            Final result               Differential Body Fluid[[id number]]                          Final result                 Please view results for these tests on the individual orders.   Gram stain    Specimen: Pleural Cavity, Left; Pleural fluid   Result Value Ref Range    Gram Stain Result No organisms seen     Gram Stain Result 3+ WBC seen    Acid-Fast Bacilli Culture and Stain    Specimen: Pleural Cavity, Left; Pleural fluid    Narrative    The following orders were created for panel order Acid-Fast Bacilli Culture and Stain.  Procedure                               Abnormality         Status                     ---------                               -----------         ------                     Acid-Fast Bacilli Cultur...[[id number]]                      In process                   Please view results for these tests on the individual orders.   Protein fluid   Result Value Ref Range    Protein Total Fluid  5.2 g/dL    Narrative    No reference ranges have been established.  This result should be interpreted in the context of the patient's clinical condition and compared to simultaneous measurement in the patient's blood.  This is a lab developed test. It has not been cleared or approved by the FDA.   Lactate dehydrogenase fluid   Result Value Ref Range    Lactate dehydrogenase fluid 424 U/L    Narrative    No reference ranges have been established.  This result should be interpreted in the context of the patient's clinical condition and compared to simultaneous measurement in the patient's blood.   Pleural fluid Aerobic Bacterial Culture Routine    Specimen: Pleural Cavity, Left; Pleural fluid   Result Value Ref Range    Culture No growth, less than 1 day    Cell Count Body Fluid   Result Value Ref Range    Color Red (A) Colorless, Yellow    Clarity Bloody Clear, Bloody    Total Nucleated Cells 2,793 /uL    Narrative    No reference ranges have been established.  This result  should be interpreted in the context of the patient's clinical condition and   compared to simultaneous measurement in the patient's blood.         Differential Body Fluid   Result Value Ref Range    % Neutrophils 88 %    % Lymphocytes 9 %    % Monocyte/Macrophages 2 %    % Eosinophils 1 %    Narrative    No reference ranges have been established. This result should be interpreted in the context of the patient's clinical condition and compared to simultaneous measurement in the patient's blood.   Blood gas arterial   Result Value Ref Range    pH Arterial 7.35 7.35 - 7.45    pCO2 Arterial 62 (H) 35 - 45 mm Hg    pO2 Arterial 116 (H) 80 - 105 mm Hg    FIO2 70     Bicarbonate Arterial 34 (H) 21 - 28 mmol/L    Base Excess/Deficit (+/-) 7.8 (H) -9.0 - 1.8 mmol/L   Glucose by meter   Result Value Ref Range    GLUCOSE BY METER POCT 153 (H) 70 - 99 mg/dL   Vancomycin level   Result Value Ref Range    Vancomycin 22.5   mg/L   Lactate Dehydrogenase    Result Value Ref Range    Lactate Dehydrogenase 577 (H) 81 - 234 U/L   Basic metabolic panel   Result Value Ref Range    Sodium 141 133 - 144 mmol/L    Potassium 6.0 (H) 3.4 - 5.3 mmol/L    Chloride 105 94 - 109 mmol/L    Carbon Dioxide (CO2) 29 20 - 32 mmol/L    Anion Gap 7 3 - 14 mmol/L    Urea Nitrogen 43 (H) 7 - 30 mg/dL    Creatinine 2.08 (H) 0.52 - 1.04 mg/dL    Calcium 7.5 (L) 8.5 - 10.1 mg/dL    Glucose 183 (H) 70 - 99 mg/dL    GFR Estimate 27 (L) >60 mL/min/1.73m2   Hemoglobin   Result Value Ref Range    Hemoglobin 7.2 (L) 11.7 - 15.7 g/dL   Potassium   Result Value Ref Range    Potassium 4.8 3.4 - 5.3 mmol/L   Glucose by meter   Result Value Ref Range    GLUCOSE BY METER POCT 164 (H) 70 - 99 mg/dL   Glucose by meter   Result Value Ref Range    GLUCOSE BY METER POCT 167 (H) 70 - 99 mg/dL   Glucose by meter   Result Value Ref Range    GLUCOSE BY METER POCT 198 (H) 70 - 99 mg/dL   Basic metabolic panel   Result Value Ref Range    Sodium 139 133 - 144 mmol/L    Potassium 4.7 3.4 - 5.3 mmol/L    Chloride 103 94 - 109 mmol/L    Carbon Dioxide (CO2) 32 20 - 32 mmol/L    Anion Gap 4 3 - 14 mmol/L    Urea Nitrogen 45 (H) 7 - 30 mg/dL    Creatinine 2.08 (H) 0.52 - 1.04 mg/dL    Calcium 7.6 (L) 8.5 - 10.1 mg/dL    Glucose 179 (H) 70 - 99 mg/dL    GFR Estimate 27 (L) >60 mL/min/1.73m2   Glucose by meter   Result Value Ref Range    GLUCOSE BY METER POCT 179 (H) 70 - 99 mg/dL   XR Chest Port 1 View    Narrative    EXAM: CHEST SINGLE VIEW PORTABLE  LOCATION: [facility]  DATE/TIME: 1/10/2022 5:40 AM    INDICATION: Respiratory failure. Pneumonia.  COMPARISON: 1/9/2022 at 1650 hours.      Impression    IMPRESSION:   1.  A moderate-sized region of hazy opacities in the inferior right lung that could relate to atelectasis, an increasing right pleural effusion or pneumonia, new since the recent comparison study.  2.  The left apical pneumothorax described on the comparison study is not clearly  visualized on this study.  3.  No other convincing interval change.   4.  Patchy opacities within the mid and lower left lung and probable left pleural effusion again noted.   5.  An endotracheal tube, enteric feeding tube, left pleural drain, and right internal jugular central venous catheter are again noted.    CBC with platelets   Result Value Ref Range    WBC Count 21.0 (H) 4.0 - 11.0 10e3/uL    RBC Count 3.09 (L) 3.80 - 5.20 10e6/uL    Hemoglobin 6.9 (LL) 11.7 - 15.7 g/dL    Hematocrit 24.1 (L) 35.0 - 47.0 %    MCV 78 78 - 100 fL    MCH 22.3 (L) 26.5 - 33.0 pg    MCHC 28.6 (L) 31.5 - 36.5 g/dL    RDW 23.7 (H) 10.0 - 15.0 %    Platelet Count 289 150 - 450 10e3/uL   Basic metabolic panel   Result Value Ref Range    Sodium 140 133 - 144 mmol/L    Potassium 4.6 3.4 - 5.3 mmol/L    Chloride 102 94 - 109 mmol/L    Carbon Dioxide (CO2) 31 20 - 32 mmol/L    Anion Gap 7 3 - 14 mmol/L    Urea Nitrogen 48 (H) 7 - 30 mg/dL    Creatinine 2.03 (H) 0.52 - 1.04 mg/dL    Calcium 7.6 (L) 8.5 - 10.1 mg/dL    Glucose 177 (H) 70 - 99 mg/dL    GFR Estimate 28 (L) >60 mL/min/1.73m2   Magnesium   Result Value Ref Range    Magnesium 2.3 1.6 - 2.3 mg/dL   Phosphorus   Result Value Ref Range    Phosphorus 6.8 (H) 2.5 - 4.5 mg/dL   Blood gas arterial   Result Value Ref Range    pH Arterial 7.40 7.35 - 7.45    pCO2 Arterial 54 (H) 35 - 45 mm Hg    pO2 Arterial 114 (H) 80 - 105 mm Hg    FIO2 70     Bicarbonate Arterial 33 (H) 21 - 28 mmol/L    Base Excess/Deficit (+/-) 7.7 (H) -9.0 - 1.8 mmol/L   Heparin Unfractionated Anti Xa Level   Result Value Ref Range    Anti Xa Unfractionated Heparin 0.52 For Reference Range, See Comment IU/mL    Narrative    Therapeutic Range: UFH: 0.25-0.50 IU/mL for low intensity dosing,  0.30-0.70 IU/mL for high intensity dosing DVT and PE.  This test is not validated for other direct factor X inhibitors (e.g. rivaroxaban, apixaban, edoxaban, betrixaban, fondaparinux) and should not be used for monitoring of  other medications.   Hepatitis B surface antigen   Result Value Ref Range    Hepatitis B Surface Antigen Nonreactive Nonreactive   Hepatitis B Surface Antibody   Result Value Ref Range    Hepatitis B Surface Antibody 0.75 <8.00 m[IU]/mL   Glucose by meter   Result Value Ref Range    GLUCOSE BY METER POCT 155 (H) 70 - 99 mg/dL   Vancomycin level   Result Value Ref Range    Vancomycin 17.2   mg/L   Triglycerides   Result Value Ref Range    Triglycerides 178 (H) <150 mg/dL   Prepare red blood cells (unit)   Result Value Ref Range    CROSSMATCH Compatible     UNIT ABO/RH O Neg     Unit Number [id number]     Unit Status Issued     Blood Component Type Red Blood Cells     Product Code [id number]     CODING SYSTEM [id number]     UNIT TYPE ISBT 9500     ISSUE DATE AND TIME [id number]    ABO/Rh type and screen    Narrative    The following orders were created for panel order ABO/Rh type and screen.  Procedure                               Abnormality         Status                     ---------                               -----------         ------                     Adult Type and Screen[[id number]]                            Final result                 Please view results for these tests on the individual orders.   Adult Type and Screen   Result Value Ref Range    ABO/RH(D) O NEG     Antibody Screen Negative Negative    SPECIMEN EXPIRATION DATE [id number]    Glucose by meter   Result Value Ref Range    GLUCOSE BY METER POCT 147 (H) 70 - 99 mg/dL   Glucose by meter   Result Value Ref Range    GLUCOSE BY METER POCT 160 (H) 70 - 99 mg/dL

## 2022-01-10 NOTE — PROGRESS NOTES
CLINICAL NUTRITION SERVICES - REASSESSMENT NOTE      Recommendations Ordered by Registered Dietitian (RD): Change TF regimen to Novasource Renal at 20 mL/hr= 960 kcal, 44 g protein, 88 g CHO, 0 g fiber, 344 mL H2O  ProSource 2 pkt TID= 66 g protein, 240 kcal   Total (TF + ProSource + Propofol)= 1680 kcal (14 kcal/kg), 110 g protein (1.7 g/kg and 98% needs)   Malnutrition: (12/30)  % Weight Loss:  None noted  % Intake:  </= 50% for >/= 5 days (severe malnutrition) (12/27) - resolved on TF  Subcutaneous Fat Loss:  None observed  Muscle Loss:  None observed  Fluid Retention:  Mild - doubt nutritional (12/26)     Malnutrition Diagnosis: Patient does not meet two of the above criteria necessary for diagnosing malnutrition       EVALUATION OF PROGRESS TOWARD GOALS   Diet:  NPO on vent     Nutrition Support:  Patient's TF was held over the weekend due to respiratory changes, on Bipap and then needed to be re-intubated.  TF was resumed on 1/8 and continues as follows ~    Nutrition Support Enteral:  Type of Feeding Tube: Nasoduodenal   Enteral Frequency:  Continuous  Enteral Regimen: Vital HP at 10 mL/hr  Total Enteral Provisions: 240 kcal, 21 g protein, 27 g CHO, 0 g fiber, 200 mL H2O  Free Water Flush: 60 mL every 4 hours   Propofol at 18.2 mL/hr= 480 kcal   ProSource 2 pkts 4x per day = 320 kcal and 88 g protein   Total = 1040 kcal (9 kcal/kg and 78% needs), 109 g protein (1.6 g/kg and 97% needs)      Intake/Tolerance:    K and Mg normal  Phos 6.8 (H) - JULIANNE on CKD   -179 with Medium sliding scale insulin, on Solucortef  I/O 4770/1955, wt 130 kg (up from dosing weight)  BM x 1 today, x 4 yesterday, and x 1 on 1/8 - Plans to place a rectal tube per rounds       ASSESSED NUTRITION NEEDS:  Dosing Weight:  121 kg (energy - 1/7/21 weight);  66 kg (protein - IBW)  Estimated Energy Needs:  1306-0552 kcals (11-14 Kcal/Kg)  Justification: obese  Estimated Protein Needs: 112-132 grams protein (1.7-2 g  pro/Kg)  Justification: wound healing, hypercatabolism with acute illness, obesity guidelines  and CKD      NEW FINDINGS:   1/8: Re-intubated   1/8:  Bronch done   1/9:  Proned --> Supine     Per rounds, plan to check TG level     Currently receiving pressor x 2    Previous Goals (1/7):   EN to meet % estimated needs   Evaluation: Not met    Previous Nutrition Diagnosis (1/7):   No nutrition diagnosis identified at this time while TF meeting estimated needs  Evaluation: Changed       CURRENT NUTRITION DIAGNOSIS  Inadequate enteral nutrition infusion related to TF at 10 mL/hr, Propofol rate has decreased as evidenced by meeting ~3/4 energy needs from current regimen     INTERVENTIONS  Recommendations / Nutrition Prescription  Change TF regimen to Novasource Renal at 20 mL/hr= 960 kcal, 44 g protein, 88 g CHO, 0 g fiber, 344 mL H2O  ProSource 2 pkt TID= 66 g protein, 240 kcal   Total (TF + ProSource + Propofol)= 1680 kcal (14 kcal/kg), 110 g protein (1.7 g/kg and 98% needs)    Implementation  EN Composition, Medical Food Supplement:  Above TF orders written   Collaboration and Referral of Nutrition care:  Patient discussed today during interdisciplinary bedside rounds     Goals  TF + ProSource + Propofol will meet % needs     MONITORING AND EVALUATION:  Progress towards goals will be monitored and evaluated per protocol and Practice Guidelines    Belgica Morris RD, LD, CNSC   Clinical Dietitian - [facility]

## 2022-01-10 NOTE — PROGRESS NOTES
Assessment and Plan:   JULIANNE: seen by Dr. May yesterday. JULIANNE assdociated with septic shock.   I/O 5579/665 UO + 640 chest tube.  UO improving.   Cr: 1.99 > 2.08 > 2.03. 22 Cr 0.64. K 4.6, HCO3 31, Na 140. Phos 6.8.   AB.40/54/114/33 > chronic resp acidosis. Alb 1.7.   FENa 0.1, Moy 12.     Rx with Lasix IV yesterday 20 mg. Also IV  ml.     Will give NS and IV alb. Decreased effective circulatory volume; pre-renal.  Monitor labs.    No current indication for dialysis                Interval History:   Sepsis: shock, enterococcal bacteremia initially. Second episode of septic shock. May be due to pneumonia and/or emmpyema. She is on levohed and vasopressin.  Resp failure:  mech vent. 70% FIO2.       MS with neurogenic bladder and UTIs.   Anemia: Hgb 6.9.            Review of Systems:   Unable. Intubated.           Medications:       acetylcysteine  1 mL Nebulization Q6H     albuterol  2.5 mg Nebulization Q6H     amiodarone  400 mg Oral or Feeding Tube Daily     aspirin  81 mg Oral or Feeding Tube Daily     chlorhexidine  15 mL Mouth/Throat Q12H     [Held by provider] DULoxetine  60 mg Oral BID     hydrocortisone sodium succinate PF  50 mg Intravenous Q6H     influenza recomb quadrivalent PF  0.5 mL Intramuscular Prior to discharge     insulin aspart  1-6 Units Subcutaneous Q4H     lactobacillus rhamnosus (GG)  1 capsule Oral BID     [Held by provider] losartan-hydrochlorothiazide  1 tablet Oral Daily     meropenem  1 g Intravenous Q12H     [Held by provider] metoprolol tartrate  25 mg Oral or Feeding Tube BID     miconazole   Topical BID     multivitamins w/minerals  15 mL Per Feeding Tube Daily     nystatin   Topical BID     pantoprazole  40 mg Per Feeding Tube QAM AC    Or     pantoprazole (PROTONIX) IV  40 mg Intravenous QAM AC     [Held by provider] polyethylene glycol  17 g Oral or Feeding Tube Daily     protein modular  2 packet Per Feeding Tube Q4H While awake     sennosides  5 mL Oral  or Feeding Tube BID     sodium chloride  3 mL Nebulization Q6H     sodium chloride (PF)  3 mL Intracatheter Q8H     vancomycin place kim - receiving intermittent dosing  1 each Does not apply See Admin Instructions       baclofen (LIORESAL) intraTHECAL Internal Pump       dextrose       epoprostenol (VELETRI) 20 mcg/mL in sterile water inhalation solution 20 ng/kg/min (01/10/22 0822)     fentaNYL 200 mcg/hr (01/10/22 0823)     heparin 2,550 Units/hr (01/10/22 0849)     midazolam 8 mg/hr (01/10/22 0824)     norepinephrine 0.08 mcg/kg/min (01/10/22 0935)     propofol (DIPRIVAN) infusion 25 mcg/kg/min (01/10/22 0848)     sodium chloride 20 mL/hr at 01/10/22 0815     vasopressin 2.4 Units/hr (01/10/22 1014)     Current active medications and PTA medications reviewed, see medication list for details.            Physical Exam:   Vitals were reviewed  Patient Vitals for the past 24 hrs:   Temp Temp src Pulse Resp SpO2 Weight   01/10/22 0930 -- -- 89 24 96 % --   01/10/22 0915 -- -- 89 25 94 % --   01/10/22 0900 99.3  F (37.4  C) Axillary 86 24 97 % --   01/10/22 0845 -- -- 87 23 97 % --   01/10/22 0830 -- -- -- 26 97 % --   01/10/22 0815 -- -- -- 26 97 % --   01/10/22 0800 -- -- -- 26 97 % --   01/10/22 0745 -- -- -- 26 96 % --   01/10/22 0730 -- -- -- 25 100 % --   01/10/22 0715 -- -- -- 25 100 % --   01/10/22 0615 -- -- -- 26 100 % --   01/10/22 0600 -- -- -- 28 100 % --   01/10/22 0545 -- -- -- 29 99 % --   01/10/22 0530 -- -- 78 24 99 % --   01/10/22 0515 -- -- 80 24 99 % --   01/10/22 0500 -- -- 81 24 99 % --   01/10/22 0445 -- -- 80 24 99 % --   01/10/22 0430 99.2  F (37.3  C) Oral 82 25 98 % --   01/10/22 0421 -- -- -- -- -- 130 kg (286 lb 9.6 oz)   01/10/22 0415 -- -- 79 -- 99 % --   01/10/22 0400 -- -- 83 23 100 % --   01/10/22 0345 -- -- 83 24 100 % --   01/10/22 0330 -- -- 78 23 100 % --   01/10/22 0315 -- -- 81 24 100 % --   01/10/22 0300 -- -- 80 24 99 % --   01/10/22 0245 -- -- 82 24 99 % --   01/10/22  0230 -- -- 81 24 99 % --   01/10/22 0215 -- -- -- 24 100 % --   01/10/22 0200 -- -- -- 24 100 % --   01/10/22 0145 -- -- -- 24 99 % --   01/10/22 0130 -- -- -- 24 100 % --   01/10/22 0115 -- -- 85 24 98 % --   01/10/22 0100 -- -- 86 24 98 % --   01/10/22 0045 -- -- 84 27 100 % --   01/10/22 0030 -- -- -- 28 100 % --   01/10/22 0015 -- -- -- 28 100 % --   01/10/22 0000 -- -- -- 26 100 % --   01/09/22 2345 -- -- -- 26 99 % --   01/09/22 2330 -- -- -- 28 99 % --   01/09/22 2315 -- -- -- 24 92 % --   01/09/22 2300 -- -- -- 26 94 % --   01/09/22 2200 -- -- 80 24 95 % --   01/09/22 2145 -- -- 80 24 95 % --   01/09/22 2130 -- -- 81 24 95 % --   01/09/22 2115 -- -- 82 23 94 % --   01/09/22 2100 -- -- 85 24 93 % --   01/09/22 2045 -- -- 84 24 94 % --   01/09/22 2030 -- -- -- 24 95 % --   01/09/22 2015 -- -- 77 24 96 % --   01/09/22 2000 -- -- 77 24 93 % --   01/09/22 1945 -- -- 77 24 97 % --   01/09/22 1930 -- -- 86 24 96 % --   01/09/22 1915 -- -- 82 24 96 % --   01/09/22 1900 -- -- 84 24 96 % --   01/09/22 1830 -- -- 85 24 96 % --   01/09/22 1815 -- -- 85 24 96 % --   01/09/22 1800 -- -- 86 24 95 % --   01/09/22 1745 -- -- 87 24 95 % --   01/09/22 1730 -- -- 87 24 94 % --   01/09/22 1715 -- -- 86 24 95 % --   01/09/22 1700 -- -- 97 -- 94 % --   01/09/22 1645 -- -- 85 24 94 % --   01/09/22 1630 -- -- 82 24 93 % --   01/09/22 1615 -- -- 88 24 90 % --   01/09/22 1600 99.2  F (37.3  C) Axillary 90 24 93 % --   01/09/22 1545 -- -- 93 25 92 % --   01/09/22 1530 -- -- 93 -- 93 % --   01/09/22 1520 -- -- 92 27 96 % --   01/09/22 1515 -- -- 92 24 92 % --   01/09/22 1510 -- -- -- -- 91 % --   01/09/22 1435 -- -- 93 27 92 % --   01/09/22 1430 -- -- 93 24 93 % --   01/09/22 1415 -- -- 91 20 93 % --   01/09/22 1400 -- -- 93 20 94 % --   01/09/22 1345 -- -- 95 20 94 % --   01/09/22 1330 -- -- 93 20 94 % --   01/09/22 1315 -- -- 94 20 94 % --   01/09/22 1300 -- -- 92 28 98 % --   01/09/22 1245 -- -- 96 20 93 % --   01/09/22 1230 -- -- 98  20 92 % --   22 1215 -- -- 98 20 93 % --   22 1200 99.6  F (37.6  C) Axillary 99 20 94 % --   22 1145 -- -- 101 20 95 % --   22 1130 -- -- 101 20 95 % --   22 1115 -- -- 103 20 95 % --   22 1105 -- -- -- 20 -- --   22 1100 -- -- 101 29 95 % --       Temp:  [99.2  F (37.3  C)-99.6  F (37.6  C)] 99.3  F (37.4  C)  Pulse:  [] 89  Resp:  [20-29] 24  MAP:  [60 mmHg-188 mmHg] 76 mmHg  Arterial Line BP: ()/() 124/58  SpO2:  [90 %-100 %] 96 %    Temperatures:  Current - Temp: 99.3  F (37.4  C); Max - Temp  Av.3  F (37.4  C)  Min: 99.2  F (37.3  C)  Max: 99.6  F (37.6  C)  Respiration range: Resp  Av  Min: 20  Max: 29  Pulse range: Pulse  Av.3  Min: 77  Max: 103  Blood pressure range: No data recorded.  ; No data recorded.    Pulse oximetry range: SpO2  Av.2 %  Min: 90 %  Max: 100 %    I/O last 3 completed shifts:  In: 4770.36 [I.V.:4015.36; NG/GT:565]  Out:  [Urine:1285; Chest Tube:670]      Intake/Output Summary (Last 24 hours) at 1/10/2022 1059  Last data filed at 1/10/2022 0900  Gross per 24 hour   Intake 4335.71 ml   Output 2490 ml   Net 1845.71 ml       Intubated, on pressors  Obese  Bobo, chest tube in place  Cor RRR nl S1 S2 no M  Lungs coarse lung sounds bilat  Generalized edema       Wt Readings from Last 4 Encounters:   01/10/22 130 kg (286 lb 9.6 oz)   01/29/20 114.3 kg (252 lb)   07/29/19 114.3 kg (252 lb)   19 114.3 kg (252 lb)          Data:          Lab Results   Component Value Date     01/10/2022     01/10/2022     2022     2019     2019     2019    Lab Results   Component Value Date    CHLORIDE 102 01/10/2022    CHLORIDE 103 01/10/2022    CHLORIDE 105 2022    CHLORIDE 103 2019    CHLORIDE 103 2019    CHLORIDE 102 2019    Lab Results   Component Value Date    BUN 48 01/10/2022    BUN 45 01/10/2022    BUN 43 2022    BUN 20  08/03/2019    BUN 17 06/21/2019    BUN 14 04/16/2019      Lab Results   Component Value Date    POTASSIUM 4.6 01/10/2022    POTASSIUM 4.7 01/10/2022    POTASSIUM 4.8 01/09/2022    POTASSIUM 4.0 08/03/2019    POTASSIUM 3.8 06/21/2019    POTASSIUM 3.9 04/16/2019    Lab Results   Component Value Date    CO2 31 01/10/2022    CO2 32 01/10/2022    CO2 29 01/09/2022    CO2 29 08/03/2019    CO2 30 06/21/2019    CO2 32 04/16/2019    Lab Results   Component Value Date    CR 2.03 01/10/2022    CR 2.08 01/10/2022    CR 2.08 01/09/2022    CR 0.47 08/03/2019    CR 0.50 06/21/2019    CR 0.58 04/16/2019        Recent Labs   Lab Test 01/10/22  0615 01/09/22  2054 01/09/22  0603 01/08/22  0652   WBC 21.0*  --  21.3* 15.3*   HGB 6.9* 7.2* 8.4* 9.3*   HCT 24.1*  --  31.5* 36.1   MCV 78  --  83 85     --  329 311     Recent Labs   Lab Test 01/09/22  0603 01/08/22  0147 01/07/22  0628 12/24/21  0403 12/23/21  2121   AST 25 20 23   < > 3,722*   ALT 42 60* 70*   < > >1,000*   ALKPHOS 112 123 121   < > 246*   BILITOTAL 1.3 0.6 0.5   < > 1.5*   ABY  --   --   --   --  49    < > = values in this interval not displayed.       Recent Labs   Lab Test 01/10/22  0615 01/09/22  0603 01/08/22  1143   MAG 2.3 2.4* 1.8     Recent Labs   Lab Test 01/10/22  0615 01/09/22  0603 01/08/22  1143   PHOS 6.8* 4.8* 2.8     Recent Labs   Lab Test 01/10/22  0615 01/10/22  0239 01/09/22  2020   MARY 7.6* 7.6* 7.5*       Lab Results   Component Value Date    MARY 7.6 (L) 01/10/2022     Lab Results   Component Value Date    WBC 21.0 (H) 01/10/2022    HGB 6.9 (LL) 01/10/2022    HCT 24.1 (L) 01/10/2022    MCV 78 01/10/2022     01/10/2022     Lab Results   Component Value Date     01/10/2022    POTASSIUM 4.6 01/10/2022    CHLORIDE 102 01/10/2022    CO2 31 01/10/2022     (H) 01/10/2022     Lab Results   Component Value Date    BUN 48 (H) 01/10/2022    CR 2.03 (H) 01/10/2022     Lab Results   Component Value Date    MAG 2.3 01/10/2022     Lab  Results   Component Value Date    PHOS 6.8 (H) 01/10/2022       Creatinine   Date Value Ref Range Status   01/10/2022 2.03 (H) 0.52 - 1.04 mg/dL Final   01/10/2022 2.08 (H) 0.52 - 1.04 mg/dL Final   01/09/2022 2.08 (H) 0.52 - 1.04 mg/dL Final   01/09/2022 1.99 (H) 0.52 - 1.04 mg/dL Final   01/09/2022 1.59 (H) 0.52 - 1.04 mg/dL Final   01/08/2022 0.64 0.52 - 1.04 mg/dL Final   08/03/2019 0.47 (L) 0.52 - 1.04 mg/dL Final   06/21/2019 0.50 (L) 0.52 - 1.04 mg/dL Final   04/16/2019 0.58 0.52 - 1.04 mg/dL Final   04/15/2019 0.57 0.52 - 1.04 mg/dL Final   04/14/2019 0.55 0.52 - 1.04 mg/dL Final   04/13/2019 0.51 (L) 0.52 - 1.04 mg/dL Final       Attestation:  I have reviewed today's vital signs, notes, medications, labs and imaging.     Osmani Mosqueda MD

## 2022-01-10 NOTE — PROGRESS NOTES
Recent Labs   Lab 01/09/22  1737 01/09/22  1408 01/09/22  0602 01/08/22  1616   PH 7.35 7.32* 7.34* 7.34*   PCO2 62* 69* 63* 78*   PO2 116* 70* 135* 166*   HCO3 34* 35* 34* 42*   O2PER 70 70 70 80         Improvement in ABG so does not need proning tonight.

## 2022-01-10 NOTE — PROGRESS NOTES
Neuro:  No withdrawal to stimulation. Sedated with propofol/versed. RASS= -4.    Fever/Pain: Low grade fever 99.2. Fentanyl gtt for pain.     CV:  SR/ST. MAP>65 maintained with levo/vaso.     Pulm: Lung sounds coarse, diminished on left greater than right. Chest tube with large amt output initially, MD notified.  Small thick white blood-tinged respiratory secretions. Supined since 1015. Remains on full ventilator support @70%/24/480/16.    : Bobo for strict I&Os/deep sedation, urine output improved to 75-100mL/hr. Minimally responsive to 20mg IV lasix.      GI: TFs at trickle. Glu levels mid 100s, given sliding scale insulin. Constant stream of loose stool, flexiseal placed to protect wounds on perineum.     Family updated by phone on POC.

## 2022-01-10 NOTE — PHARMACY-VANCOMYCIN DOSING SERVICE
Pharmacy Vancomycin Note  Date of Service January 10, 2022  Patient's  1965   56 year old, female    Indication: Aspiration Pneumonia  Day of Therapy: 3  Current vancomycin regimen: intermittent doses based on levels  Current vancomycin monitoring method: Trough (Method 1 = dosing nomogram)  Current vancomycin therapeutic monitoring goal: 15-20 mg/L      Current estimated CrCl = Estimated Creatinine Clearance: 43.7 mL/min (A) (based on SCr of 2.03 mg/dL (H)).    Creatinine for last 3 days  2022:  1:47 AM Creatinine 0.52 mg/dL;  6:52 AM Creatinine 0.64 mg/dL  2022:  6:03 AM Creatinine 1.59 mg/dL;  2:07 PM Creatinine 1.99 mg/dL;  8:20 PM Creatinine 2.08 mg/dL  1/10/2022:  2:39 AM Creatinine 2.08 mg/dL;  6:15 AM Creatinine 2.03 mg/dL    Recent Vancomycin Levels (past 3 days)  2022:  9:35 AM Vancomycin 29.0 mg/L;  8:20 PM Vancomycin 22.5 mg/L  1/10/2022:  9:06 AM Vancomycin 17.2 mg/L    Vancomycin IV Administrations (past 72 hours)                   vancomycin 1250 mg in 0.9% NaCl 250 mL intermittent infusion 1,250 mg (mg) 1,250 mg New Bag 22 2133    vancomycin 2500 mg in 0.9% NaCl 500 ml intermittent infusion 2,500 mg (mg) 2,500 mg New Bag 22 0925                Nephrotoxins and other renal medications (From now, onward)    Start     Dose/Rate Route Frequency Ordered Stop    01/10/22 1200  vancomycin (VANCOCIN) 2,500 mg in sodium chloride 0.9 % 250 mL intermittent infusion         2,500 mg (central catheter)  over 90 Minutes Intravenous ONCE 01/10/22 1131      22 1100  vasopressin 0.2 units/mL in NS (PITRESSIN) standard conc infusion         2.4 Units/hr  12 mL/hr  Intravenous CONTINUOUS 22 1052      22 0809  vancomycin place kim - receiving intermittent dosing         1 each Does not apply SEE ADMIN INSTRUCTIONS 22 0810      22 0530  norepinephrine (LEVOPHED) 4 mg in  mL infusion PREMIX         0.01-0.6 mcg/kg/min × 121 kg  4.5-272.3 mL/hr   Intravenous CONTINUOUS 01/08/22 0508               Contrast Orders - past 72 hours (72h ago, onward)            Start     Dose/Rate Route Frequency Ordered Stop    01/09/22 1500  iopamidol (ISOVUE-370) solution 124 mL         124 mL Intravenous ONCE 01/09/22 1433 01/09/22 1445    01/09/22 1430  perflutren diluted 1mL to 2mL with saline (OPTISON) diluted injection 9 mL         9 mL Intravenous ONCE 01/09/22 1412 01/09/22 1412          Interpretation of levels and current regimen:  Vancomycin level is reflective of therapeutic level    Has serum creatinine changed greater than 50% in last 72 hours: No    Urine output:  diminished urine output    Renal Function: Worsening      Plan:  1. Give 2500mg x 1  2. Vancomycin monitoring method: Trough (Method 1 = dosing nomogram)  3. Vancomycin therapeutic monitoring goal: 15-20 mg/L  4. Pharmacy will check vancomycin levels as appropriate in 1-3 Days.  5. Serum creatinine levels will be ordered a minimum of twice weekly.    Tiara Montiel, RP

## 2022-01-10 NOTE — PROGRESS NOTES
[facility]    Infectious Disease Progress Note    Date of Service: 01/10/2022     Assessment:  1. Acute hypoxic respiratory failure related to pneumonia, mucous plug  and large pleural effusion with concern for empyema. S/p bronchoscopy. BAL cxs only show candida albicans , nasal MRSA PCR positive. S/p thoracentesis, exudative hemorrhagic fluid, cxs pending  2. 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 TTE without evidence of endocarditis, blood cxs cleared rapidly .  3. Perineal wounds, candida intertrigo  4. JULIANNE, transaminitis related to sepsis improved  5. 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 Has bilateral renal calculi , urology has evaluated and recommended stone management at a later date.  6. S.epidermidis bacteremia likely due to culture contamination. Polymicrobial bacteremia seems unlikely. Pump site does not appear infected, there is no overlying erythema and CT abdomen done twice did not show stranding or fluid collection around pump site.   7. Atrial fibrillation, on amiodarone and in sinus rhythm  8. Densities along the pelvic sidewall, unclear whether chronic fluid collection/seroma. or adenopathy. In view of sepsis, wound recommend re evaluation with imaging .  9. Cholelithiasis  10. Chronic medical conditions - DM, HTN, hyperlipidemia, morbidly obese body habitus.     Recommendations  1. Maintain on Vancomycin, Meropenem  2. Follow BAL and pleural fluid cxs      Hazel Villagomez MD    Interval History   Remains intubated    Physical Exam   Temp: 99.3  F (37.4  C) Temp src: Axillary  Pulse: 89   Resp: 24 SpO2: 96 %      Vitals:    01/07/22 0653 01/09/22 0230 01/10/22 0421   Weight: 121 kg (266 lb 12.1 oz) 111.6 kg (246 lb 0.5 oz) 130 kg (286 lb  9.6 oz)     Vital Signs with Ranges  Temp:  [99.2  F (37.3  C)-99.3  F (37.4  C)] 99.3  F (37.4  C)  Pulse:  [77-98] 89  Resp:  [20-29] 24  MAP:  [60 mmHg-188 mmHg] 76 mmHg  Arterial Line BP: (100-210)/() 124/58  SpO2:  [90 %-100 %] 96 %    Constitutional: intubated, sedated  Lungs: posterior lungs clear  Cardiovascular: S1S2  Skin: chronic stasis changes bilateral lower extremities, perineal erythema, intertrigo, excoriated lesions  MS : BL LE lymphedema    Other:    Medications     baclofen (LIORESAL) intraTHECAL Internal Pump       dextrose       epoprostenol (VELETRI) 20 mcg/mL in sterile water inhalation solution 20 ng/kg/min (01/10/22 0822)     fentaNYL 200 mcg/hr (01/10/22 0823)     heparin 2,550 Units/hr (01/10/22 0849)     midazolam 8 mg/hr (01/10/22 0824)     norepinephrine 0.08 mcg/kg/min (01/10/22 0935)     propofol (DIPRIVAN) infusion 25 mcg/kg/min (01/10/22 0848)     sodium chloride 20 mL/hr at 01/10/22 0815     vasopressin 2.4 Units/hr (01/10/22 1014)       sodium chloride 0.9%  500 mL Intravenous Once     acetylcysteine  1 mL Nebulization Q6H     albumin human  12.5 g Intravenous Q6H     albuterol  2.5 mg Nebulization Q6H     amiodarone  400 mg Oral or Feeding Tube Daily     aspirin  81 mg Oral or Feeding Tube Daily     chlorhexidine  15 mL Mouth/Throat Q12H     [Held by provider] DULoxetine  60 mg Oral BID     hydrocortisone sodium succinate PF  50 mg Intravenous Q6H     influenza recomb quadrivalent PF  0.5 mL Intramuscular Prior to discharge     insulin aspart  1-6 Units Subcutaneous Q4H     lactobacillus rhamnosus (GG)  1 capsule Oral BID     [Held by provider] losartan-hydrochlorothiazide  1 tablet Oral Daily     meropenem  1 g Intravenous Q12H     [Held by provider] metoprolol tartrate  25 mg Oral or Feeding Tube BID     miconazole   Topical BID     multivitamins w/minerals  15 mL Per Feeding Tube Daily     nystatin   Topical BID     pantoprazole  40 mg Per Feeding Tube QAM AC    Or      pantoprazole (PROTONIX) IV  40 mg Intravenous QAM AC     [Held by provider] polyethylene glycol  17 g Oral or Feeding Tube Daily     protein modular  2 packet Per Feeding Tube Q4H While awake     sennosides  5 mL Oral or Feeding Tube BID     sodium chloride  3 mL Nebulization Q6H     sodium chloride (PF)  3 mL Intracatheter Q8H     vancomycin  2,500 mg (central catheter) Intravenous Once     vancomycin place kim - receiving intermittent dosing  1 each Does not apply See Admin Instructions       Data   All microbiology laboratory data reviewed.  Recent Labs   Lab Test 01/10/22  0615 01/09/22  2054 01/09/22  0603 01/08/22 0652   WBC 21.0*  --  21.3* 15.3*   HGB 6.9* 7.2* 8.4* 9.3*   HCT 24.1*  --  31.5* 36.1   MCV 78  --  83 85     --  329 311     Recent Labs   Lab Test 01/10/22  0615 01/10/22  0239 01/09/22 2020   CR 2.03* 2.08* 2.08*     Recent Labs   Lab Test 12/23/21 2121   SED 14     Component      Latest Ref Rng & Units 1/9/2022   % Neutrophils Fluid      % 88   % Lymphocytes Fluid      % 9   % Mono/Macro Fluid      % 2   % Eosinophils Fluid      % 1   Color Fluid      Colorless, Yellow Red (A)   Appearance Fluid      Clear, Bloody Bloody   WBC Fluid      /uL 2,793   Protein Total Fluid      g/dL 5.2   Lactate Dehydrogenase Fluid      U/L 424     Imaging  1/9 CT chest  EXAM: CT CHEST/ABDOMEN/PELVIS WITH CONTRAST  LOCATION: [facility]  DATE/TIME: 01/09/2022, 3:06 PM     INDICATION: Sepsis.  COMPARISON: 12/23/2021.  TECHNIQUE: CT scan of the chest, abdomen, and pelvis was performed following injection of IV contrast. Multiplanar reformats were obtained. Dose reduction techniques were used.   CONTRAST: 124 mL Isovue-370.      FINDINGS:   LUNGS AND PLEURA: Complete atelectasis of the left lung. There is a large pleural effusion with evidence of multiple internal loculations. There is high-density within the pleural fluid in the left hemithorax suggesting pockets of hemorrhage.  A small   right pleural effusion is present. There is interlobular septal thickening in the right lung and areas of groundglass opacity.     MEDIASTINUM/AXILLAE: Right internal jugular central line terminates in the SVC. An endotracheal tube is appropriately positioned. A feeding tube extends into the distal duodenum. A nasogastric tube terminates in the stomach. The thyroid gland is   unremarkable. No pathologically enlarged thoracic or axillary lymph nodes. Caliber of the thoracic aorta is within normal limits.     CORONARY ARTERY CALCIFICATION: Mild.     HEPATOBILIARY: Gallstones are present in the gallbladder. No worrisome liver lesions.     PANCREAS: Normal.     SPLEEN: Normal.     ADRENAL GLANDS: Normal.     KIDNEYS/BLADDER: Nonobstructing 7 mm calculus in the right renal collecting system. A few tiny calcifications in the left kidney, decreased from prior. No hydronephrosis or worrisome renal mass. Urinary bladder decompressed by a Bobo catheter.     BOWEL: No bowel obstruction or inflammatory change. Normal appendix. No free air or intra-abdominal abscess. No ascites.     LYMPH NODES: Normal.     VASCULATURE: Nonaneurysmal aortic atherosclerosis.     PELVIC ORGANS: Fat-containing lesion in the left pelvis measuring 1.8 cm (series 3, image 262) likely a small dermoid.     MUSCULOSKELETAL: Chronic right femoral neck fracture noted. Lumbosacral fusion hardware noted. No destructive bone lesions.                                                                      IMPRESSION:  1.  There is a large, multiloculated, left pleural effusion with areas of high-density suggesting hemorrhagic contents. The possibility of empyema is not excluded.  2.  Complete atelectasis of the left lung.  3.  Small right pleural effusion associated with groundglass opacities and interlobular septal thickening in the right lung could be related to infection or pulmonary edema.  4.  Cholelithiasis.  5.  Nonobstructing nephrolithiasis

## 2022-01-10 NOTE — PLAN OF CARE
Neuro:  Propofol/versed/fentanyl gtts all decreased today.  RAAS goal adjusted.  Does not follow.    CV: Levo and vasopressin also decreased significantly.  See emar for doses.  Transfused 1U RBC.  Low grade temps.    Resp:  FiO2 decreased to 40%.  No other changes.  Full strength veletri.  Lungs coarse and diminished on left.  Chest tube with dark red drainage.  Heparin gtt for DVT.    GI/:  Rectal tube 500cc output.  UOP improved.  Renal following.  Trickle feed.  Plan to switch to renal formula TF.    Skin:  Numerous areas of improved rash, bruising, excoriation, peeling.  Nystatin cream/powder applied, Intradry utilized.  Lymphedema leg wraps removed today.

## 2022-01-10 NOTE — PLAN OF CARE
Neuro:  No withdrawal to stimulation. Sedated with propofol/versed. RASS=-4.  Fever/Pain: Low grade fever. Fentanyl gtt for pain.   CV:  SR/ST. MAP>65 maintained with levo/vaso.   Pulm: Lung sounds coarse, very diminished on left, improved post-chest tube placement. Small thick white blood-tinged respiratory secretions. Supined at 1015. Remains on full ventilator support @70%/24/480/16.  : Bobo for strict I&Os/deep sedation, very low urine, minimal improvement post-LR bolus x2.   GI: TFs at trickle. Glu levels mid 100s, given sliding scale insulin. Frequent loose stools today.  Plan: Continue to promote vent synchrony. Family updated by phone on POC.

## 2022-01-10 NOTE — PHARMACY-VANCOMYCIN DOSING SERVICE
Pharmacy Vancomycin Note  Date of Service 2022  Patient's  1965   56 year old, female    Indication: Aspiration Pneumonia  Day of Therapy: 2  Current vancomycin regimen:  Intermittent dosing per levels  Current vancomycin monitoring method: Tr & AUC until level stabilized  Current vancomycin therapeutic monitoring goal: Tr 15-20 or -600 mg*h/L      Current estimated CrCl = Estimated Creatinine Clearance: 39.3 mL/min (A) (based on SCr of 2.08 mg/dL (H)).    Creatinine for last 3 days  2022:  6:28 AM Creatinine 0.53 mg/dL  2022:  1:47 AM Creatinine 0.52 mg/dL;  6:52 AM Creatinine 0.64 mg/dL  2022:  6:03 AM Creatinine 1.59 mg/dL;  2:07 PM Creatinine 1.99 mg/dL;  8:20 PM Creatinine 2.08 mg/dL    Recent Vancomycin Levels (past 3 days)  2022:  9:35 AM Vancomycin 29.0 mg/L;  8:20 PM Vancomycin 22.5 mg/L    Vancomycin IV Administrations (past 72 hours)                   vancomycin 1250 mg in 0.9% NaCl 250 mL intermittent infusion 1,250 mg (mg) 1,250 mg New Bag 22 2133    vancomycin 2500 mg in 0.9% NaCl 500 ml intermittent infusion 2,500 mg (mg) 2,500 mg New Bag 22 0925                Nephrotoxins and other renal medications (From now, onward)    Start     Dose/Rate Route Frequency Ordered Stop    22 1100  vasopressin 0.2 units/mL in NS (PITRESSIN) standard conc infusion         2.4 Units/hr  12 mL/hr  Intravenous CONTINUOUS 22 1052      22 0809  vancomycin place kim - receiving intermittent dosing         1 each Does not apply SEE ADMIN INSTRUCTIONS 22 0810      22 0530  norepinephrine (LEVOPHED) 4 mg in  mL infusion PREMIX         0.01-0.6 mcg/kg/min × 121 kg  4.5-272.3 mL/hr  Intravenous CONTINUOUS 22 0508               Contrast Orders - past 72 hours (72h ago, onward)            Start     Dose/Rate Route Frequency Ordered Stop    22 1500  iopamidol (ISOVUE-370) solution 124 mL         124 mL Intravenous ONCE 22  1433 01/09/22 1445    01/09/22 1430  perflutren diluted 1mL to 2mL with saline (OPTISON) diluted injection 9 mL         9 mL Intravenous ONCE 01/09/22 1412 01/09/22 1412          Interpretation of levels and current regimen:  Vancomycin level is reflective of supratherapeutic level    Has serum creatinine changed greater than 50% in last 72 hours: Yes    Urine output:  decreased    Renal Function: Worsening    Plan:  1. Will continue to hold dose and recheck level ~ 12 hr  2. Vancomycin monitoring method: Tr & AUC until stabilized  3. Vancomycin therapeutic monitoring goal: Tr 15-20 & -600  4. Pharmacy will check vancomycin levels as appropriate in ~ 12 hr.  5. Serum creatinine levels will be ordered daily for the first week of therapy and at least twice weekly for subsequent weeks.    Anh Sweet [facility]

## 2022-01-11 NOTE — PROGRESS NOTES
"[facility] Nurse Inpatient Wound Assessment     Reason for consultation: Evaluate and treat \"bilateral posterior thighs, buttocks, coccyx, vaginal area\" \"coccyx/sacrum, groin\"    Assessment  ABD folds, under breast, axilla  skin breakdown due to Moisture Associated Skin Damage (MASD) and suspected fungal vs bacterial rash     Buttock skin breakdown due to moisture, friction, pressure, chronic illness     Posterior thighs and groin and perineal skin breakdown due to MASD     Treatment Plan    Coccyx: Every 3 days and PRN  1. Clean wound with saline or MicroKlenz Spray, pat dry  2. Wipe / \"clean\" the surrounding periwound tissue with skin prep (Cavilon No Sting Skin Prep #[id number]) and allow to dry. This will help protect periwound and help dressing adherence  3. Press a Mepilex  Sacral Dressing (PS#[id number])  to the area, making sure to conform nicely to skin curvatures.   4. Time and date dressing change  NOTE  -Reposition pt side to side christiano when in bed, every 2 hours-get the pt way over on side to completely offload pressure. This will benefit skin and respiratory function   -Keep heels elevated and floating on pillows at all times. Try using at least 2 pillows under each calf.  -When up to the chair pt needs to fully offload every 2 hours and use a chair cushion if needed       Groin, pannus, breasts, axilla: BID and PRN  1. cleanse with kemi cleanse and protect and ciro dry wipes (or water only and ciro dry wipes). AVOID pre moistened wipes.   2. Fan folds to ensure they are completely dry  3. Dust a thin layer of antifungal powder and gently rub into place  4. Avoid use of Interdry for 3 days as powders can clog interdry. Starting 1/14, stop antifungal powder and switch to using   Laying a single layer of fabric in the skin fold, placing one edge into the base of the fold. Gently smooth the rest of the fabric over the skin, keeping it flat. Leave at least 2 inches of fabric " "exposed outside the skin fold.    Orders Updated  Recommended provider order: None, at this time  [facility] Nurse follow-up plan: weekly  Nursing to notify the Provider(s) and re-consult the [facility] Nurse if wound(s) deteriorates or new skin concern.    Patient History  According to provider note(s):  \"56-year-old woman with complex history including non-Hodgkin's lymphoma, multiple sclerosis on baclofen pump, hypertension, DM2, with recurrent infections who was admitted 12/23 for decreased level of consciousness. Possible concern for codeine excess.\"    Objective Data  Containment of urine/stool: Incontinence Protocol, Incontinent pad in bed, Indwelling catheter and Internal fecal management    Active Diet Order:  Orders Placed This Encounter      NPO for Medical/Clinical Reasons Except for: Meds    Output:   I/O last 3 completed shifts:  In: 4685.8 [I.V.:2615.8; NG/GT:400]  Out: 2955 [Urine:1355; Emesis/NG output:400; Stool:700; Chest Tube:500]    Risk Assessment:   Sensory Perception: 1-->completely limited  Moisture: 3-->occasionally moist  Activity: 1-->bedfast  Mobility: 1-->completely immobile  Nutrition: 2-->probably inadequate  Friction and Shear: 1-->problem  Issac Score: 9                          Labs:   Recent Labs   Lab 01/11/22  0527 01/09/22  2054 01/09/22  1612 01/09/22  0603 01/07/22  0628 01/06/22  2327   ALBUMIN  --   --   --  1.7*   < >  --    HGB 8.1*   < >  --  8.4*   < >  --    INR  --   --  1.37*  --   --   --    WBC 17.1*   < >  --  21.3*   < >  --    CRP  --   --   --   --   --  67.5*    < > = values in this interval not displayed.       Physical Exam  Skin inspection: focused abd folds, perineal, groin, buttock, posterior thighs          1/11      Wound Location:  Buttocks, posterior thighs   Date of last photo:  1-11  Wound History: per [facility] charting 2019, patient with bilat buttock unstageable pressure injuries. Per patient spouse, MASD and chronic pressure/friction skin breakdown also noted at " home and at TCU prior to admission   Measurements (length x width x depth, in cm): few pea sided superficial erosions  Wound Base:  Few scattered of superficial erosions and bleeding  Tunneling: N/A   Undermining: N/A  Palpation of the wound bed: firm   Periwound skin: scar tissue and resolving erythema  Color: pink and faint purple  Temperature: normal   Drainage: none  Description of drainage: none  Odor: none  Pain: none noted      Wound Location:  abd folds   Date of last photo:  None taken  Wound History: present on admission   Measurements (length x width x depth, in cm):  Generalized to bilt abd fold, breast fold and groin fold   Wound Base:  Dermis, moisture splits   Tunneling: N/A  Undermining: N/A  Palpation of the wound bed: normal   Periwound skin: rash and superficial erosion, scattered satellite lesions   Color: pink and red  Temperature: normal   Drainage: small  Description of drainage: serosanguinous  Odor: mild  Pain: nonverbal indicators absent   Interventions  Current support surface: Standard  Low air loss mattress  Current off-loading measures: Pillows under calves, Pillows and Wedge positioning system  Visual inspection of wound(s) completed  Wound Care: done per plan of care  Supplies: at bedside, discussed with RN and discussed with patient  Education provided: importance of repositioning, plan of care, wound progress, Infection prevention , Moisture management, Hygiene and Off-loading pressure  Discussed plan of care with Patient and Nurse    Loco Wheat RN CWOCN

## 2022-01-11 NOTE — PROGRESS NOTES
Intensivist brief note  Patient with Hb 6.7 again this evening after having received 2 units RBC's this afternoon; 2 more units ordered.     She does not seem unstable but concern with lower hb and 2 units ordered. Her VS seem well compensated and she is vlad less pressor.  I will keep her moving towards less support this evening as tolerated.     rufino strauss  January 11, 2022

## 2022-01-11 NOTE — PROGRESS NOTES
Assessment and Plan:   JULIANNE: assoc with septic shock. Received volume expansion yesterday.   I/O 4104/1530 UO, 500 GI  Cr: 2.08 > 2.03 > 1.84. Lytes nominal. Elevated phos and low Ca noted.     Responded well to volume. Will give saline and alb again today.  Monitor labs. Non-oliguric. No indication for dialysis.              Interval History:   Resp failure/mech vent: chest tube in place for pntx.   Anemia: 4 U PRBC yest.   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    MS  Recurrent UTI: bacteremia.   DM  HT  Obesity             Review of Systems:   Intubated.           Medications:       acetylcysteine  1 mL Nebulization Q6H     albuterol  2.5 mg Nebulization Q6H     amiodarone  400 mg Oral or Feeding Tube Daily     aspirin  81 mg Oral or Feeding Tube Daily     chlorhexidine  15 mL Mouth/Throat Q12H     [Held by provider] DULoxetine  60 mg Oral BID     hydrocortisone sodium succinate PF  50 mg Intravenous Q6H     influenza recomb quadrivalent PF  0.5 mL Intramuscular Prior to discharge     insulin aspart  1-12 Units Subcutaneous Q4H     lactobacillus rhamnosus (GG)  1 capsule Oral BID     [Held by provider] losartan-hydrochlorothiazide  1 tablet Oral Daily     meropenem  1 g Intravenous Q12H     [Held by provider] metoprolol tartrate  25 mg Oral or Feeding Tube BID     miconazole   Topical BID     multivitamins w/minerals  15 mL Per Feeding Tube Daily     nystatin   Topical BID     pantoprazole  40 mg Per Feeding Tube QAM AC    Or     pantoprazole (PROTONIX) IV  40 mg Intravenous QAM AC     [Held by provider] polyethylene glycol  17 g Oral or Feeding Tube Daily     protein modular  2 packet Per Feeding Tube TID     sennosides  5 mL Oral or Feeding Tube BID     sodium chloride  3 mL Nebulization Q6H     sodium chloride (PF)  3 mL Intracatheter Q8H     vancomycin place kim - receiving intermittent dosing  1 each Does not apply See  Admin Instructions       baclofen (LIORESAL) intraTHECAL Internal Pump       dextrose       epoprostenol (VELETRI) 20 mcg/mL in sterile water inhalation solution 20 ng/kg/min (01/11/22 0749)     fentaNYL 50 mcg/hr (01/11/22 0756)     heparin 2,550 Units/hr (01/11/22 0751)     midazolam Stopped (01/11/22 0902)     norepinephrine Stopped (01/11/22 0100)     propofol (DIPRIVAN) infusion 15 mcg/kg/min (01/11/22 0806)     sodium chloride 20 mL/hr at 01/11/22 0756     vasopressin Stopped (01/11/22 0758)     Current active medications and PTA medications reviewed, see medication list for details.            Physical Exam:   Vitals were reviewed  Patient Vitals for the past 24 hrs:   Temp Temp src Pulse Resp SpO2 Weight   01/11/22 0915 -- -- 82 23 92 % --   01/11/22 0900 -- -- 81 23 93 % --   01/11/22 0845 97.6  F (36.4  C) Axillary 83 24 95 % --   01/11/22 0830 -- -- 82 24 96 % --   01/11/22 0815 -- -- 81 24 97 % --   01/11/22 0800 -- -- 80 24 96 % --   01/11/22 0745 -- -- 74 21 95 % --   01/11/22 0730 -- -- 71 23 96 % --   01/11/22 0715 -- -- 70 23 97 % --   01/11/22 0700 -- -- 70 24 97 % --   01/11/22 0645 -- -- 71 24 98 % --   01/11/22 0630 -- -- 72 24 97 % --   01/11/22 0615 -- -- 73 24 97 % --   01/11/22 0600 -- -- 75 24 97 % --   01/11/22 0545 -- -- 78 24 96 % --   01/11/22 0530 -- -- 82 24 91 % --   01/11/22 0515 -- -- 75 24 92 % --   01/11/22 0500 -- -- 76 24 93 % --   01/11/22 0445 -- -- 76 24 93 % --   01/11/22 0430 -- -- 77 24 93 % --   01/11/22 0415 -- -- 80 24 93 % --   01/11/22 0400 98.1  F (36.7  C) Axillary 82 24 94 % 131.2 kg (289 lb 3.9 oz)   01/11/22 0345 -- -- 77 24 94 % --   01/11/22 0330 -- -- 76 24 94 % --   01/11/22 0315 -- -- 75 24 94 % --   01/11/22 0300 -- -- 75 24 94 % --   01/11/22 0245 -- -- 73 24 95 % --   01/11/22 0240 -- -- -- -- 97 % --   01/11/22 0230 -- -- 75 24 96 % --   01/11/22 0215 -- -- 75 24 96 % --   01/11/22 0200 98  F (36.7  C) Axillary 79 24 95 % --   01/11/22 0155 98.1  F  (36.7  C) Axillary 78 24 97 % --   01/11/22 0150 98.3  F (36.8  C) Axillary 78 24 95 % --   01/11/22 0147 98.3  F (36.8  C) Axillary 79 24 95 % --   01/11/22 0145 -- -- 79 24 95 % --   01/11/22 0130 -- -- 79 24 96 % --   01/11/22 0115 -- -- 79 24 96 % --   01/11/22 0100 98.1  F (36.7  C) Axillary 80 24 96 % --   01/11/22 0055 98  F (36.7  C) Axillary 80 24 96 % --   01/11/22 0050 98.4  F (36.9  C) Axillary 80 24 95 % --   01/11/22 0045 98  F (36.7  C) Axillary 80 24 95 % --   01/11/22 0040 97.4  F (36.3  C) Axillary 80 24 95 % --   01/11/22 0037 97.4  F (36.3  C) Axillary 80 24 -- --   01/11/22 0030 -- -- 80 24 95 % --   01/11/22 0015 -- -- 80 24 95 % --   01/11/22 0000 98.3  F (36.8  C) Axillary 78 24 99 % --   01/10/22 2345 -- -- 80 24 94 % --   01/10/22 2330 -- -- 81 24 93 % --   01/10/22 2323 -- -- -- -- 94 % --   01/10/22 2315 -- -- 81 24 94 % --   01/10/22 2300 -- -- 80 24 94 % --   01/10/22 2200 98.6  F (37  C) Oral 81 23 94 % --   01/10/22 2130 -- -- 80 24 93 % --   01/10/22 2100 -- -- 80 23 93 % --   01/10/22 2045 98.5  F (36.9  C) Oral 82 24 95 % --   01/10/22 2030 -- -- 81 24 93 % --   01/10/22 2015 -- -- 80 24 93 % --   01/10/22 2000 -- -- 81 23 93 % --   01/10/22 1900 98.6  F (37  C) Oral 82 24 93 % --   01/10/22 1845 -- -- 82 23 93 % --   01/10/22 1837 98.6  F (37  C) Oral 82 23 93 % --   01/10/22 1830 -- -- 82 23 93 % --   01/10/22 1815 -- -- 82 23 100 % --   01/10/22 1800 -- -- 89 (!) 58 100 % --   01/10/22 1745 -- -- 84 (!) 32 93 % --   01/10/22 1730 -- -- 86 24 92 % --   01/10/22 1715 -- -- 87 (!) 50 92 % --   01/10/22 1700 -- -- 87 23 91 % --   01/10/22 1630 -- -- 89 24 -- --   01/10/22 1615 -- -- 89 24 91 % --   01/10/22 1600 98.7  F (37.1  C) Axillary 85 23 91 % --   01/10/22 1545 -- -- 85 23 91 % --   01/10/22 1530 98.8  F (37.1  C) Axillary 85 (!) 98 91 % --   01/10/22 1515 -- -- 87 (!) 38 91 % --   01/10/22 1500 -- -- 86 24 91 % --   01/10/22 1445 -- -- 87 23 91 % --   01/10/22 1430 -- -- 87  24 91 % --   01/10/22 1415 99.6  F (37.6  C) Axillary 90 24 91 % --   01/10/22 1400 -- -- 86 24 91 % --   01/10/22 1345 -- -- 88 24 93 % --   01/10/22 1330 99.7  F (37.6  C) Axillary 85 23 95 % --   01/10/22 1315 -- -- 87 24 98 % --   01/10/22 1300 -- -- 85 24 97 % --   01/10/22 1245 -- -- 87 23 97 % --   01/10/22 1230 100  F (37.8  C) Axillary 86 23 98 % --   01/10/22 1215 100  F (37.8  C) Axillary 87 24 98 % --   01/10/22 1200 -- -- 87 24 98 % --   01/10/22 1145 -- -- 88 24 98 % --   01/10/22 1130 -- -- 87 24 98 % --   01/10/22 1115 -- -- 87 24 98 % --   01/10/22 1100 -- -- 87 24 97 % --   01/10/22 1045 -- -- 87 24 97 % --       Temp:  [97.4  F (36.3  C)-100  F (37.8  C)] 97.6  F (36.4  C)  Pulse:  [70-90] 82  Resp:  [21-98] 23  MAP:  [65 mmHg-91 mmHg] 73 mmHg  Arterial Line BP: (104-147)/(44-69) 120/51  FiO2 (%):  [40 %-50 %] 50 %  SpO2:  [91 %-100 %] 92 %    Temperatures:  Current - Temp: 97.6  F (36.4  C); Max - Temp  Av.5  F (36.9  C)  Min: 97.4  F (36.3  C)  Max: 100  F (37.8  C)  Respiration range: Resp  Av.5  Min: 21  Max: 98  Pulse range: Pulse  Av.1  Min: 70  Max: 90  Blood pressure range: No data recorded.  ; No data recorded.    Pulse oximetry range: SpO2  Av.6 %  Min: 91 %  Max: 100 %    I/O last 3 completed shifts:  In: 4685.8 [I.V.:2615.8; NG/GT:400]  Out: 2955 [Urine:1355; Emesis/NG output:400; Stool:700; Chest Tube:500]      Intake/Output Summary (Last 24 hours) at 2022 1031  Last data filed at 2022 0800  Gross per 24 hour   Intake 3951.33 ml   Output 2660 ml   Net 1291.33 ml       Intubated, obese  Lungs with coarse ant BS  Chest tube in place  Cor RRR nl S1 S2 no M  UE bilat edema, LE chronic stasis dermatitis  Bobo     Wt Readings from Last 4 Encounters:   22 131.2 kg (289 lb 3.9 oz)   20 114.3 kg (252 lb)   19 114.3 kg (252 lb)   19 114.3 kg (252 lb)          Data:          Lab Results   Component Value Date     2022      01/10/2022     01/10/2022     08/03/2019     06/21/2019     04/16/2019    Lab Results   Component Value Date    CHLORIDE 104 01/11/2022    CHLORIDE 102 01/10/2022    CHLORIDE 103 01/10/2022    CHLORIDE 103 08/03/2019    CHLORIDE 103 06/21/2019    CHLORIDE 102 04/16/2019    Lab Results   Component Value Date    BUN 59 01/11/2022    BUN 48 01/10/2022    BUN 45 01/10/2022    BUN 20 08/03/2019    BUN 17 06/21/2019    BUN 14 04/16/2019      Lab Results   Component Value Date    POTASSIUM 4.2 01/11/2022    POTASSIUM 4.6 01/10/2022    POTASSIUM 4.7 01/10/2022    POTASSIUM 4.0 08/03/2019    POTASSIUM 3.8 06/21/2019    POTASSIUM 3.9 04/16/2019    Lab Results   Component Value Date    CO2 30 01/11/2022    CO2 31 01/10/2022    CO2 32 01/10/2022    CO2 29 08/03/2019    CO2 30 06/21/2019    CO2 32 04/16/2019    Lab Results   Component Value Date    CR 1.84 01/11/2022    CR 2.03 01/10/2022    CR 2.08 01/10/2022    CR 0.47 08/03/2019    CR 0.50 06/21/2019    CR 0.58 04/16/2019        Recent Labs   Lab Test 01/11/22  0527 01/10/22  2343 01/10/22  1728 01/10/22  0615 01/09/22 2054 01/09/22  0603   WBC 17.1*  --   --  21.0*  --  21.3*   HGB 8.1* 6.8* 6.6* 6.9*   < > 8.4*   HCT 27.2*  --   --  24.1*  --  31.5*   MCV 82  --   --  78  --  83     --   --  289  --  329    < > = values in this interval not displayed.     Recent Labs   Lab Test 01/09/22  0603 01/08/22  0147 01/07/22  0628 12/24/21  0403 12/23/21  2121   AST 25 20 23   < > 3,722*   ALT 42 60* 70*   < > >1,000*   ALKPHOS 112 123 121   < > 246*   BILITOTAL 1.3 0.6 0.5   < > 1.5*   ABY  --   --   --   --  49    < > = values in this interval not displayed.       Recent Labs   Lab Test 01/11/22  0527 01/10/22  0615 01/09/22  0603   MAG 2.6* 2.3 2.4*     Recent Labs   Lab Test 01/11/22  0527 01/10/22  0615 01/09/22  0603   PHOS 7.3* 6.8* 4.8*     Recent Labs   Lab Test 01/11/22  0527 01/10/22  0615 01/10/22  0239   MARY 7.7* 7.6* 7.6*       Lab  Results   Component Value Date    MARY 7.7 (L) 01/11/2022     Lab Results   Component Value Date    WBC 17.1 (H) 01/11/2022    HGB 8.1 (L) 01/11/2022    HCT 27.2 (L) 01/11/2022    MCV 82 01/11/2022     01/11/2022     Lab Results   Component Value Date     01/11/2022    POTASSIUM 4.2 01/11/2022    CHLORIDE 104 01/11/2022    CO2 30 01/11/2022     (H) 01/11/2022     Lab Results   Component Value Date    BUN 59 (H) 01/11/2022    CR 1.84 (H) 01/11/2022     Lab Results   Component Value Date    MAG 2.6 (H) 01/11/2022     Lab Results   Component Value Date    PHOS 7.3 (H) 01/11/2022       Creatinine   Date Value Ref Range Status   01/11/2022 1.84 (H) 0.52 - 1.04 mg/dL Final   01/10/2022 2.03 (H) 0.52 - 1.04 mg/dL Final   01/10/2022 2.08 (H) 0.52 - 1.04 mg/dL Final   01/09/2022 2.08 (H) 0.52 - 1.04 mg/dL Final   01/09/2022 1.99 (H) 0.52 - 1.04 mg/dL Final   01/09/2022 1.59 (H) 0.52 - 1.04 mg/dL Final   08/03/2019 0.47 (L) 0.52 - 1.04 mg/dL Final   06/21/2019 0.50 (L) 0.52 - 1.04 mg/dL Final   04/16/2019 0.58 0.52 - 1.04 mg/dL Final   04/15/2019 0.57 0.52 - 1.04 mg/dL Final   04/14/2019 0.55 0.52 - 1.04 mg/dL Final   04/13/2019 0.51 (L) 0.52 - 1.04 mg/dL Final       Attestation:  I have reviewed today's vital signs, notes, medications, labs and imaging.     Osmani Mosqueda MD

## 2022-01-11 NOTE — PROGRESS NOTES
[facility] ICU RESPIRATORY NOTE        Date of Admission: 12/23/2021    Date of Intubation (most recent):  1/8/22    Reason for Mechanical Ventilation: Resp failure    Number of Days on Mechanical Ventilation: 3    Met Criteria for Spontaneous Breathing Trial: No    Reason for No Spontaneous Breathing Trial: Per MD    Significant Events Today: None    ABG Results:   Recent Labs   Lab 01/10/22  0615 01/09/22  1737 01/09/22  1408 01/09/22  0602   PH 7.40 7.35 7.32* 7.34*   PCO2 54* 62* 69* 63*   PO2 114* 116* 70* 135*   HCO3 33* 34* 35* 34*   O2PER 70 70 70 70       Current Vent Settings: Ventilation Mode: CMV/AC  (Continuous Mandatory Ventilation/ Assist Control)  FiO2 (%): 70 %  Rate Set (breaths/minute): 20 breaths/min  Tidal Volume Set (mL): 400 mL  PEEP (cm H2O): 16 cmH2O  Oxygen Concentration (%): 50 %  Resp: 24      Plan:  Pt to remain on full vent support overnight    Gio Lujan, RT

## 2022-01-11 NOTE — PROGRESS NOTES
Count includes the [facility] ICU RESPIRATORY NOTE           Date of Admission: 12/23/2021     Date of Intubation (most recent):  1/8/22     Reason for Mechanical Ventilation: Resp failure     Number of Days on Mechanical Ventilation: 4     Met Criteria for Spontaneous Breathing Trial: No     Reason for No Spontaneous Breathing Trial: Per MD     Significant Events Today: None     ABG Results:   Recent Labs   Lab 01/10/22  0615 01/09/22  1737 01/09/22  1408 01/09/22  0602   PH 7.40 7.35 7.32* 7.34*   PCO2 54* 62* 69* 63*   PO2 114* 116* 70* 135*   HCO3 33* 34* 35* 34*   O2PER 70 70 70 70     Ventilation Mode: CMV/AC  (Continuous Mandatory Ventilation/ Assist Control)  FiO2 (%): 40 %  Rate Set (breaths/minute): 20 breaths/min  Tidal Volume Set (mL): 400 mL  PEEP (cm H2O): 16 cmH2O  Oxygen Concentration (%): 50 %  Resp: 24    JERZY Mayo, RRT

## 2022-01-11 NOTE — PLAN OF CARE
(Shift 9906-8863) Pt remains intubated/sedated on propofol/fentanyl/versed, sedation lightened overnight. RASS remains -4. Continues with full strength veletri. 2 units PRBC's given for Hgb 6.8, recheck Hgb 8.1 this AM. Levophed weaned off @0100, Vasopressin still infusing (half dose). Heparin infusion is therapeutic this AM. Bobo patent, UOP 50-75mL/hr. Flexiseal patent, 200mL liquid output. TF @ goal. Blood sugars 166-171 with sliding scale insulin coverage. Left chest tube, 100mL sanguinous output. Continue to monitor.     MD notified of platelet drop >20% since starting heparin, no new orders.

## 2022-01-11 NOTE — PROGRESS NOTES
[facility]  Vascular Medicine Progress Note            Assessment and Plan:       Amanda Richardson is a 56 year old female with history of MS with baclofen pump in place, neurogenic bladder, recurrent UTIs, chronic pain syndrome, DM II, hypertension, hyperlipidemia, CKD, Non-hodgkin's lymphoma, Obesity who was admitted on 12/23/21 after presenting to ED with altered mental status. Patient was intubated, treated for septic shock due to UTI, blood Cx (+) E fecalis. She was extubated 12/30/21, transferred to floor 12/31/21.   She had progressive hypoxia, underwent left therapeutic thoracentesis (500 ml). RRT 1/8/22 for severe hypoxia prompting intubation and ICU transfer.        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    Acute hypoxemic hypercarbic respiratory failure intubated again on January 8, 2021    Status post bronchoscopy for mucous plugging on January 8 and January 9    HCAP    Left-sided hemothorax status post CT January 9, 2022    Anemia hemoglobin 6.6 requiring transfusion today       -The patient is symptomatic from the above catheter associated IJ and right upper extremity DVT and SVT.  One treatment approach would simply be to remove the offending catheters and undertakes serial imaging.    Discussed with ICU provider this morning. Consider PICC line if able    .  The likelihood of upper extremity embolization to the lungs is extremely low. Would not presently recommended obtaining a PE protocol CT of the chest.     -She has pleural effusions. S/P repeat thoracentesis. Chest tube in place now.     Agree with blood transfusion and hold heparin if needed and restart same once HGB stable .  -Eventually, when all procedures are done, she can be transitioned to therapeutic oral anticoagulation in the form of a DOAC.  She is morbidly obese, but is not at a weight that exceeds the upper limit of recommended utilization  for DOACs.    -No hypercoagulable workup is warranted as these are catheter-associated thrombotic events.      -If able to tolerate anticoagulation, would recommend a minimum of 12 weeks (3 months) of anticoagulation.  We will see the patient in outpatient followup to determine ultimate timing of cessation of anticoagulation.     Critical care time spent 35 minutes today     Nadir Torres MD, FA, [facility]  Vascular Medicine                    Interval History:   She is intubated, sedated on vent  Hemoglobin dropped 6.6 received transfusion  CT tube in place  Getting IV antibiotics              Review of Systems:   Unable to get ROS as she is intubated and sedated.              Medications:       acetylcysteine  1 mL Nebulization Q6H     albuterol  2.5 mg Nebulization Q6H     amiodarone  400 mg Oral or Feeding Tube Daily     aspirin  81 mg Oral or Feeding Tube Daily     chlorhexidine  15 mL Mouth/Throat Q12H     [Held by provider] DULoxetine  60 mg Oral BID     hydrocortisone sodium succinate PF  50 mg Intravenous Q6H     influenza recomb quadrivalent PF  0.5 mL Intramuscular Prior to discharge     insulin aspart  1-6 Units Subcutaneous Q4H     lactobacillus rhamnosus (GG)  1 capsule Oral BID     [Held by provider] losartan-hydrochlorothiazide  1 tablet Oral Daily     meropenem  1 g Intravenous Q12H     [Held by provider] metoprolol tartrate  25 mg Oral or Feeding Tube BID     miconazole   Topical BID     multivitamins w/minerals  15 mL Per Feeding Tube Daily     nystatin   Topical BID     pantoprazole  40 mg Per Feeding Tube QAM AC    Or     pantoprazole (PROTONIX) IV  40 mg Intravenous QAM AC     [Held by provider] polyethylene glycol  17 g Oral or Feeding Tube Daily     protein modular  2 packet Per Feeding Tube TID     sennosides  5 mL Oral or Feeding Tube BID     sodium chloride  3 mL Nebulization Q6H     sodium chloride (PF)  3 mL Intracatheter Q8H     vancomycin place kim - receiving intermittent  dosing  1 each Does not apply See Admin Instructions                  Physical Exam:     Vitals were reviewed  Patient Vitals for the past 24 hrs:   Temp Temp src Pulse Resp SpO2 Weight   01/11/22 0700 -- -- 70 24 97 % --   01/11/22 0645 -- -- 71 24 98 % --   01/11/22 0630 -- -- 72 24 97 % --   01/11/22 0615 -- -- 73 24 97 % --   01/11/22 0600 -- -- 75 24 97 % --   01/11/22 0545 -- -- 78 24 96 % --   01/11/22 0530 -- -- 82 24 91 % --   01/11/22 0515 -- -- 75 24 92 % --   01/11/22 0500 -- -- 76 24 93 % --   01/11/22 0445 -- -- 76 24 93 % --   01/11/22 0430 -- -- 77 24 93 % --   01/11/22 0415 -- -- 80 24 93 % --   01/11/22 0400 98.1  F (36.7  C) Axillary 82 24 94 % 289 lb 3.9 oz (131.2 kg)   01/11/22 0345 -- -- 77 24 94 % --   01/11/22 0330 -- -- 76 24 94 % --   01/11/22 0315 -- -- 75 24 94 % --   01/11/22 0300 -- -- 75 24 94 % --   01/11/22 0245 -- -- 73 24 95 % --   01/11/22 0240 -- -- -- -- 97 % --   01/11/22 0230 -- -- 75 24 96 % --   01/11/22 0215 -- -- 75 24 96 % --   01/11/22 0200 98  F (36.7  C) Axillary 79 24 95 % --   01/11/22 0155 98.1  F (36.7  C) Axillary 78 24 97 % --   01/11/22 0150 98.3  F (36.8  C) Axillary 78 24 95 % --   01/11/22 0147 98.3  F (36.8  C) Axillary 79 24 95 % --   01/11/22 0145 -- -- 79 24 95 % --   01/11/22 0130 -- -- 79 24 96 % --   01/11/22 0115 -- -- 79 24 96 % --   01/11/22 0100 98.1  F (36.7  C) Axillary 80 24 96 % --   01/11/22 0055 98  F (36.7  C) Axillary 80 24 96 % --   01/11/22 0050 98.4  F (36.9  C) Axillary 80 24 95 % --   01/11/22 0045 98  F (36.7  C) Axillary 80 24 95 % --   01/11/22 0040 97.4  F (36.3  C) Axillary 80 24 95 % --   01/11/22 0037 97.4  F (36.3  C) Axillary 80 24 -- --   01/11/22 0030 -- -- 80 24 95 % --   01/11/22 0015 -- -- 80 24 95 % --   01/11/22 0000 98.3  F (36.8  C) Axillary 78 24 99 % --   01/10/22 2345 -- -- 80 24 94 % --   01/10/22 2330 -- -- 81 24 93 % --   01/10/22 2323 -- -- -- -- 94 % --   01/10/22 2315 -- -- 81 24 94 % --   01/10/22 2300 -- --  80 24 94 % --   01/10/22 2200 98.6  F (37  C) Oral 81 23 94 % --   01/10/22 2130 -- -- 80 24 93 % --   01/10/22 2100 -- -- 80 23 93 % --   01/10/22 2045 98.5  F (36.9  C) Oral 82 24 95 % --   01/10/22 2030 -- -- 81 24 93 % --   01/10/22 2015 -- -- 80 24 93 % --   01/10/22 2000 -- -- 81 23 93 % --   01/10/22 1900 98.6  F (37  C) Oral 82 24 93 % --   01/10/22 1845 -- -- 82 23 93 % --   01/10/22 1837 98.6  F (37  C) Oral 82 23 93 % --   01/10/22 1830 -- -- 82 23 93 % --   01/10/22 1815 -- -- 82 23 100 % --   01/10/22 1800 -- -- 89 (!) 58 100 % --   01/10/22 1745 -- -- 84 (!) 32 93 % --   01/10/22 1730 -- -- 86 24 92 % --   01/10/22 1715 -- -- 87 (!) 50 92 % --   01/10/22 1700 -- -- 87 23 91 % --   01/10/22 1630 -- -- 89 24 -- --   01/10/22 1615 -- -- 89 24 91 % --   01/10/22 1600 98.7  F (37.1  C) Axillary 85 23 91 % --   01/10/22 1545 -- -- 85 23 91 % --   01/10/22 1530 98.8  F (37.1  C) Axillary 85 (!) 98 91 % --   01/10/22 1515 -- -- 87 (!) 38 91 % --   01/10/22 1500 -- -- 86 24 91 % --   01/10/22 1445 -- -- 87 23 91 % --   01/10/22 1430 -- -- 87 24 91 % --   01/10/22 1415 99.6  F (37.6  C) Axillary 90 24 91 % --   01/10/22 1400 -- -- 86 24 91 % --   01/10/22 1345 -- -- 88 24 93 % --   01/10/22 1330 99.7  F (37.6  C) Axillary 85 23 95 % --   01/10/22 1315 -- -- 87 24 98 % --   01/10/22 1300 -- -- 85 24 97 % --   01/10/22 1245 -- -- 87 23 97 % --   01/10/22 1230 100  F (37.8  C) Axillary 86 23 98 % --   01/10/22 1215 100  F (37.8  C) Axillary 87 24 98 % --   01/10/22 1200 -- -- 87 24 98 % --   01/10/22 1145 -- -- 88 24 98 % --   01/10/22 1130 -- -- 87 24 98 % --   01/10/22 1115 -- -- 87 24 98 % --   01/10/22 1100 -- -- 87 24 97 % --   01/10/22 1045 -- -- 87 24 97 % --   01/10/22 1030 -- -- 88 24 96 % --   01/10/22 1015 -- -- 87 24 97 % --   01/10/22 1000 -- -- 89 24 96 % --   01/10/22 0945 -- -- 89 24 96 % --   01/10/22 0930 -- -- 89 24 96 % --   01/10/22 0915 -- -- 89 25 94 % --   01/10/22 0900 99.3  F (37.4  C)  Axillary 86 24 97 % --   01/10/22 0845 -- -- 87 23 97 % --   01/10/22 0830 -- -- -- 26 97 % --   01/10/22 0815 -- -- -- 26 97 % --     Wt Readings from Last 4 Encounters:   01/11/22 289 lb 3.9 oz (131.2 kg)   01/29/20 252 lb (114.3 kg)   07/29/19 252 lb (114.3 kg)   07/17/19 252 lb (114.3 kg)       Intake/Output Summary (Last 24 hours) at 1/6/2022 1033  Last data filed at 1/6/2022 0600  Gross per 24 hour   Intake 2860 ml   Output 2950 ml   Net -90 ml     Constitutional: intubated/sedated.   Eyes: normal  ENT: normal  Neck: Supple, symmetrical. Right internal jugular central line in place.   Back: normal  Lungs: decreased BS anteriorly. Chest tube in place.   Cardiovascular: Regular rate and rhythm, normal S1 and S2, no S3 or S4, and no murmur noted.  Abdomen: obese.   Musculoskeletal: RUE w/ significant enlargement compared to left; Not tense or taut  Neurologic: Sedated  Neuropsychiatric: Sedated.   Skin: normal           Data:     Results for orders placed or performed during the hospital encounter of 12/23/21 (from the past 24 hour(s))   Glucose by meter   Result Value Ref Range    GLUCOSE BY METER POCT 155 (H) 70 - 99 mg/dL   Vancomycin level   Result Value Ref Range    Vancomycin 17.2   mg/L   Triglycerides   Result Value Ref Range    Triglycerides 178 (H) <150 mg/dL   Prepare red blood cells (unit)   Result Value Ref Range    CROSSMATCH Compatible     UNIT ABO/RH O Neg     Unit Number [id number]     Unit Status Transfused     Blood Component Type Red Blood Cells     Product Code T6885T37     CODING SYSTEM [id number]     UNIT TYPE ISBT 9500     ISSUE DATE AND TIME [id number]    ABO/Rh type and screen    Narrative    The following orders were created for panel order ABO/Rh type and screen.  Procedure                               Abnormality         Status                     ---------                               -----------         ------                     Adult Type and Screen[[id number]]                             Final result                 Please view results for these tests on the individual orders.   Adult Type and Screen   Result Value Ref Range    ABO/RH(D) O NEG     Antibody Screen Negative Negative    SPECIMEN EXPIRATION DATE [id number]    Glucose by meter   Result Value Ref Range    GLUCOSE BY METER POCT 147 (H) 70 - 99 mg/dL   Glucose by meter   Result Value Ref Range    GLUCOSE BY METER POCT 160 (H) 70 - 99 mg/dL   Hemoglobin   Result Value Ref Range    Hemoglobin 6.6 (LL) 11.7 - 15.7 g/dL   Prepare red blood cells (unit)   Result Value Ref Range    CROSSMATCH Compatible     UNIT ABO/RH O Neg     Unit Number [id number]     Unit Status Issued     Blood Component Type Red Blood Cells     Product Code [id number]     CODING SYSTEM [id number]     UNIT TYPE ISBT 9500     ISSUE DATE AND TIME [id number]    Glucose by meter   Result Value Ref Range    GLUCOSE BY METER POCT 168 (H) 70 - 99 mg/dL   Hemoglobin   Result Value Ref Range    Hemoglobin 6.8 (LL) 11.7 - 15.7 g/dL   Glucose by meter   Result Value Ref Range    GLUCOSE BY METER POCT 171 (H) 70 - 99 mg/dL   Prepare red blood cells (unit)   Result Value Ref Range    CROSSMATCH Compatible     UNIT ABO/RH O Neg     Unit Number [id number]     Unit Status Issued     Blood Component Type Red Blood Cells     Product Code V8837V15     CODING SYSTEM [id number]     UNIT TYPE ISBT 9500     ISSUE DATE AND TIME [id number]    Prepare red blood cells (unit)   Result Value Ref Range    CROSSMATCH Compatible     UNIT ABO/RH O Neg     Unit Number [id number]     Unit Status Issued     Blood Component Type Red Blood Cells     Product Code X0306M75     CODING SYSTEM [id number]     UNIT TYPE ISBT 9500     ISSUE DATE AND TIME [id number]    Glucose by meter   Result Value Ref Range    GLUCOSE BY METER POCT 166 (H) 70 - 99 mg/dL   CBC with platelets   Result Value Ref Range    WBC Count 17.1 (H) 4.0 - 11.0 10e3/uL    RBC Count 3.33 (L) 3.80 - 5.20 10e6/uL     Hemoglobin 8.1 (L) 11.7 - 15.7 g/dL    Hematocrit 27.2 (L) 35.0 - 47.0 %    MCV 82 78 - 100 fL    MCH 24.3 (L) 26.5 - 33.0 pg    MCHC 29.8 (L) 31.5 - 36.5 g/dL    RDW 21.4 (H) 10.0 - 15.0 %    Platelet Count 230 150 - 450 10e3/uL   Basic metabolic panel   Result Value Ref Range    Sodium 141 133 - 144 mmol/L    Potassium 4.2 3.4 - 5.3 mmol/L    Chloride 104 94 - 109 mmol/L    Carbon Dioxide (CO2) 30 20 - 32 mmol/L    Anion Gap 7 3 - 14 mmol/L    Urea Nitrogen 59 (H) 7 - 30 mg/dL    Creatinine 1.84 (H) 0.52 - 1.04 mg/dL    Calcium 7.7 (L) 8.5 - 10.1 mg/dL    Glucose 176 (H) 70 - 99 mg/dL    GFR Estimate 31 (L) >60 mL/min/1.73m2   Magnesium   Result Value Ref Range    Magnesium 2.6 (H) 1.6 - 2.3 mg/dL   Phosphorus   Result Value Ref Range    Phosphorus 7.3 (H) 2.5 - 4.5 mg/dL   Heparin Unfractionated Anti Xa Level   Result Value Ref Range    Anti Xa Unfractionated Heparin 0.48 For Reference Range, See Comment IU/mL    Narrative    Therapeutic Range: UFH: 0.25-0.50 IU/mL for low intensity dosing,  0.30-0.70 IU/mL for high intensity dosing DVT and PE.  This test is not validated for other direct factor X inhibitors (e.g. rivaroxaban, apixaban, edoxaban, betrixaban, fondaparinux) and should not be used for monitoring of other medications.   Blood gas arterial   Result Value Ref Range    pH Arterial 7.37 7.35 - 7.45    pCO2 Arterial 53 (H) 35 - 45 mm Hg    pO2 Arterial 59 (L) 80 - 105 mm Hg    FIO2 40     Bicarbonate Arterial 31 (H) 21 - 28 mmol/L    Base Excess/Deficit (+/-) 5.0 (H) -9.0 - 1.8 mmol/L   XR Chest Port 1 View    Narrative    EXAM: XR CHEST PORT 1 VIEW  LOCATION: [facility]  DATE/TIME: 1/11/2022 5:27 AM    INDICATION: Left hemothorax s/p chest tube, eval hemothorax drainage  COMPARISON: 01/10/2022      Impression    IMPRESSION: Endotracheal tube terminates at 5.1 cm from the ron. Enteric tube terminates below diaphragm and field of view. Right-sided central venous catheter with  tip over SVC.    Left-sided chest tube in situ. Stable small to moderate left pleural effusion. No pneumothorax appreciated. Improved aeration at the right lung base with mild right basilar opacity. Stable moderately enlarged cardiac silhouette.     *Note: Due to a large number of results and/or encounters for the requested time period, some results have not been displayed. A complete set of results can be found in Results Review.

## 2022-01-11 NOTE — PLAN OF CARE
Neuro: Remains unresponsive despite sedation decrease.  Does not follow, cough,gag.  Continues on low dose propofol and low dose fentanyl.    CV:  Afebrile today.  All pressors off.  Heparin continues.  Chest tube output slowing.  Chest tube and arterial line dressings changed.    Resp:  50%. PEEP decreased to 14.  1/2 strength veletri and tolerating.  Minimal secretions.    GI:  Rectal tube continues but output slowing.  Still liquid.  Tube feeding changed to renal formulation and increased to 20/hr.    :  UOP improving.    Skin:  WOC continues to follow.  Multiple scattered bruises, areas of excoriation, peeling.  Nystatin powder in skin folds, barrier cream to romeo/coccyx area.

## 2022-01-11 NOTE — PROGRESS NOTES
[facility] ICU RESPIRATORY NOTE        Date of Admission: 12/23/2021    Date of Intubation (most recent):  1/8/22    Reason for Mechanical Ventilation: Resp failure    Number of Days on Mechanical Ventilation: 4    Met Criteria for Spontaneous Breathing Trial: No    Reason for No Spontaneous Breathing Trial: Per MD    Significant Events Today: None    ABG Results:   Recent Labs   Lab 01/11/22  0528 01/10/22  0615 01/09/22  1737 01/09/22  1408   PH 7.37 7.40 7.35 7.32*   PCO2 53* 54* 62* 69*   PO2 59* 114* 116* 70*   HCO3 31* 33* 34* 35*   O2PER 40 70 70 70       Current Vent Settings: Ventilation Mode: CMV/AC  (Continuous Mandatory Ventilation/ Assist Control)  FiO2 (%): 50 %  Rate Set (breaths/minute): 20 breaths/min  Tidal Volume Set (mL): 400 mL  PEEP (cm H2O): 14 cmH2O  Oxygen Concentration (%): 50 %  Resp: 26      Plan:  Pt remains on full vent support overnight    Gio Lujan, RT

## 2022-01-11 NOTE — PROGRESS NOTES
[facility] ICU PROGRESS NOTE  01/11/2022      Date of Service (when I saw the patient): 01/11/2022    ASSESSMENT:   Amanda Richardson is a 56 year old female with history of MS with baclofen pump in place, neurogenic bladder, recurrent UTIs, chronic pain syndrome, DM II, hypertension, hyperlipidemia, CKD, Non-hodgkin's lymphoma, Obesity who was admitted on 12/23/21 after presenting to ED with altered mental status. Patient was intubated, treated for septic shock due to UTI, blood Cx (+) E fecalis. She was extubated 12/30/21, transferred to floor 12/31/21.  S/p  left therapeutic thoracentesis (500 ml) on 1/5/22 for hypoxia . RRT 1/8/22 for severe hypoxia prompting intubation and ICU transfer. Septic shock, ARDS and hypoxic  Respiratory failure.     CHANGES and MAJOR THINGS TODAY:   Chest tube stripped with large clot removed   - position to help drain hemothorax   Increase insulin to high intensity   Increase TF towards goal (neprho)   Wean solucortef once off pressors x24 hrs   Wean sedation as able to target RASS goal- wean Versed gtt to off as able   Thoracic consult for possible VAT's given persistent bleedinf and high CT outputs   Hold off removing RIJ at this time, may be able to remove all central access altogether if remains off pressors.       PLAN:    Neuro/ pain/ sedation:  # Acute pain  # Sedation for ventilator compliance  # MS, s/p baclofen pump  # Acute metabolic encephalopathy  # Depression   - Pain: Fentanyl infusion, oxycodone PRN, Fentanyl IV PRN.   - Sedation: Propofol and Midazolam, PRN Midazolam IVP   - Encephalopathy documented in prior hospitalist notes prior to intubation  - Cymbalta on hold given inability to crush.      Pulmonary care:   # Acute hypoxemic hypercarbic respiratory failure, intubated 1/8  # HCAP  # Left pleural effusion, s/p thoracentesis 1/5  # Mucous plugging, s/p bronchoscopy 1/8 and 1/9   # left hemothorax s/p CT 1/9/22   Ventilation Mode: CMV/AC  (Continuous Mandatory  Ventilation/ Assist Control)  FiO2 (%): 50 %  Rate Set (breaths/minute): 20 breaths/min  Tidal Volume Set (mL): 400 mL  PEEP (cm H2O): 16 cmH2O  Oxygen Concentration (%): 50 %  Resp: 23  - VT 6 ml/kg of IBW. Plateau 26  - Continue full strength veletri, wean once able to decrease FIO2 and PEEP.   - Several bronchs for mucous plugging   -             Continue mucolytic therapy with Mucomyst and Albuterol and CPT.  - Chest tube in Left chest - daily CXR  - CT of chest 1/9/22 with complete atelectasis of left lung, multiple loculations               - unclear when appeared, question if from prior thoracentesis on 1/5/22  - total total of  bloody serosanguinous drainage 670ml/500mL. If no improvement in lung/pulmonary status, may need thoracic consult for VATS      Cardiovascular:    # Septic shock,   # Paroxysmal atrial fibrillation  # CHF  # Hyperlipidemia  - Monitor hemodynamic status. Goal MAP >65. Norepi/Vasopressin (off this am around 6 am)   -  Stress dose steroids 50 mg every 6 hours    - Continue PO Amiodarone. Lytes replaced.  K>4.0,Mag >2.5  - Continue 81 mg Aspirin  - 1/1 ECHO Normal LV function and seize. EF 60-65%. RV mildly dilated.   - 1/9: normal EF 65-70. left ventricle normal. Left ventricular systolic function is normal. No regional wall motion abnormalities noted. right ventricle is normal in size and function.     GI:   # Severe protein calorie malnutrition  # Cholelithiasis   # diarrhea   - PPI  - Continue TF- RD following, will increase rate now that pt off pressors   - now with rectal tube in place.   - Cholelithiasis present without cholecystitis in CT A/P 1/3. Repeat hepatic panel in the setting of fever, increasing pressor requirements and leukocytosis.   - CTABP 1/9/22:   left pleural effusion with areas of high-density suggesting hemorrhagic contents. Complete atelectasis of the left lung. Small right pleural effusion associated with groundglass opacities and interlobular septal thickening  in the right lung could be related to infection or pulmonary edema. Cholelithiasis and Nonobstructing nephrolithiasis.    Renal/ Fluid Balance:    # JULIANNE   # hypervolemia  - Creatinine baseline 0.64, peaked at 2.03, now  1.84   - 1/9/22:  FeNA ~ 0.1%  - 1/3 CT A/P with non obstructing renal stones.   - 1/9/21:  US renal: No hydronephrosis, non-obstructing stones.                - seen by urology 1/5, no acute intervention as stone non-obstructing and no evidence of hydronephrosis of US, follow up as OP for definite stone removal with Dr early.   - renal following for JULIANNE   - 8 kg down from admission weight, diuresed prior to ICU transfer for ongoing hypoxia     Endocrine:    # DM II  # Relative adrenal insufficiency   - holding  PTA oral agents   - Sliding scale for diabetes management. Goal to keep BG <180. Increase to high intensity insulin   - continue with solu-cortef 50mg every 6 hour,      ID/ Antibiotics:  # HCAP  # Enterococcus faecalis bacteremia 2/2 urinary source- treated - Completed E fecalis bacteremia treatment 1/7  - Antibiotics: Vancomcyn (1/8--). Broaden to Meropenem 1/10 (was on Zosyn)   - ID following    Culture:   - 1/8:  MRSA swab positive 1/8   - COVID-19 PCR negative.   - 1/9: BC x 2, UA with reflex  - 1/9: Cultures pending: BAL.   - 1/5 pleural fluid culture negative.     Heme:   # RIJ DVT (1/5)   # LIJ DVT  # superficial thrombus in cephalic vein from mid to proximal forearm  1/5  # Anemia of chronic illness  # Anemia of critical illness  - Vascular surgery consulted 1/5/22. Continue with tx of heparin gtt, plan for 12 weeks total for DVT's.   - Continue heparin infusion for RIJ DVT                - line still in place as functional and  L  internal jugular DVT noted on beside US 1/8     Lines/ tubes/ drains:  - RIJ TLC, L axillary arterial line, ETT, [place], [place].      Disposition:  - [facility]     Time spent on this Encounter   Billing:  I spent50 minutes bedside and on the inpatient unit  today managing the critical care of Amanda Richardson in relation to the issues listed in this note.      Ruiz Gutierrez  ====================================  INTERVAL HISTORY:  Course reviewed. Events in past 24 hours noted. Weaned off pressors this am. Required total of 4units PRBC overnight. Not able to perform ROS.     OBJECTIVE:   1. VITAL SIGNS:   Temp:  [97.4  F (36.3  C)-100  F (37.8  C)] 98.1  F (36.7  C)  Pulse:  [70-90] 83  Resp:  [23-98] 24  MAP:  [65 mmHg-172 mmHg] 87 mmHg  Arterial Line BP: (104-176)/() 135/62  FiO2 (%):  [40 %-50 %] 50 %  SpO2:  [91 %-100 %] 95 %  Ventilation Mode: CMV/AC  (Continuous Mandatory Ventilation/ Assist Control)  FiO2 (%): 50 %  Rate Set (breaths/minute): 20 breaths/min  Tidal Volume Set (mL): 400 mL  PEEP (cm H2O): 16 cmH2O  Oxygen Concentration (%): 50 %  Resp: 24      2. INTAKE/ OUTPUT:   I/O last 3 completed shifts:  In: 4685.8 [I.V.:2615.8; NG/GT:400]  Out: 2955 [Urine:1355; Emesis/NG output:400; Stool:700; Chest Tube:500]    3. PHYSICAL EXAMINATION:  General: sedated, No response to name or voice  HEENT: pupils 3mm and reactive. ETT present and secured. OG to LIS, bilious drainage. FT present/   Neuro: sedated.   Pulm/Resp: Left chest tube with large clots in tubing--stripped with return of serosanguinous drainage. Decreased breath sounds on left side. Right lungs coarse but clear   CV: RRR, S1/S2.   Abdomen: abdominal pannus compressible.   : (+) ramos catheter in place, urine darker yellow but clear  Incisions/Skin: skin fold  areas reddened and moist, consistent with yeast, chronic skin changes consistent with chronic lymphedema and hemosiderin staining. Scattered ecchymosis  MSK/Extremities: generalized peripheral edema in upper extremity, chronic lymphedema in BLE.  pulses 2+.    4. INVESTIGATIONS:   Arterial Blood Gases   Recent Labs   Lab 01/11/22  0528 01/10/22  0615 01/09/22  1737 01/09/22  1408   PH 7.37 7.40 7.35 7.32*   PCO2 53* 54* 62* 69*   PO2  59* 114* 116* 70*   HCO3 31* 33* 34* 35*     Complete Blood Count   Recent Labs   Lab 01/11/22  0527 01/10/22  2343 01/10/22  1728 01/10/22  0615 01/09/22  2054 01/09/22  0603 01/08/22 0652   WBC 17.1*  --   --  21.0*  --  21.3* 15.3*   HGB 8.1* 6.8* 6.6* 6.9*   < > 8.4* 9.3*     --   --  289  --  329 311    < > = values in this interval not displayed.     Basic Metabolic Panel  Recent Labs   Lab 01/11/22  0527 01/11/22  0356 01/10/22  2346 01/10/22  1944 01/10/22  0900 01/10/22  0615 01/10/22  0409 01/10/22  0239 01/09/22  2313 01/09/22  2305 01/09/22 2020     --   --   --   --  140  --  139  --   --  141   POTASSIUM 4.2  --   --   --   --  4.6  --  4.7  --  4.8 6.0*   CHLORIDE 104  --   --   --   --  102  --  103  --   --  105   CO2 30  --   --   --   --  31  --  32  --   --  29   BUN 59*  --   --   --   --  48*  --  45*  --   --  43*   CR 1.84*  --   --   --   --  2.03*  --  2.08*  --   --  2.08*   * 166* 171* 168*   < > 177*   < > 179*   < >  --  183*    < > = values in this interval not displayed.     Liver Function Tests  Recent Labs   Lab 01/09/22  1612 01/09/22  0603 01/08/22  0147 01/07/22 0628 01/06/22 0621   AST  --  25 20 23 28   ALT  --  42 60* 70* 93*   ALKPHOS  --  112 123 121 135   BILITOTAL  --  1.3 0.6 0.5 0.5   ALBUMIN  --  1.7* 2.0* 2.0* 2.1*   INR 1.37*  --   --   --   --      Pancreatic Enzymes  No lab results found in last 7 days.  Coagulation Profile  Recent Labs   Lab 01/09/22  1612   INR 1.37*       5. RADIOLOGY:   Recent Results (from the past 24 hour(s))   XR Chest Port 1 View    Narrative    EXAM: XR CHEST PORT 1 VIEW  LOCATION: [facility]  DATE/TIME: 1/11/2022 5:27 AM    INDICATION: Left hemothorax s/p chest tube, eval hemothorax drainage  COMPARISON: 01/10/2022      Impression    IMPRESSION: Endotracheal tube terminates at 5.1 cm from the ron. Enteric tube terminates below diaphragm and field of view. Right-sided central venous  catheter with tip over SVC.    Left-sided chest tube in situ. Stable small to moderate left pleural effusion. No pneumothorax appreciated. Improved aeration at the right lung base with mild right basilar opacity. Stable moderately enlarged cardiac silhouette.       =========================================

## 2022-01-11 NOTE — PROGRESS NOTES
[facility]    Infectious Disease Progress Note    Date of Service : 01/11/2022     Assessment:  1. Acute hypoxic respiratory failure related to acute hemothorax. S/p bronchoscopy. BAL cxs only show candida albicans , nasal MRSA PCR positive. S/p chest tube placement, exudative hemorrhagic fluid, pleural fluid and blood cxs negative thus far  2. 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 TTE without evidence of endocarditis, blood cxs cleared rapidly .  4. Perineal wounds, candida intertrigo  5. JULIANNE, transaminitis related to sepsis improved  6. 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 Has bilateral renal calculi , urology has evaluated and recommended stone management at a later date.  7. S.epidermidis bacteremia likely due to culture contamination. Polymicrobial bacteremia seems unlikely. Pump site does not appear infected, there is no overlying erythema and CT abdomen done twice did not show stranding or fluid collection around pump site.   8. Atrial fibrillation, on amiodarone and in sinus rhythm  9. Densities along the pelvic sidewall, unclear whether chronic fluid collection/seroma. or adenopathy. In view of sepsis, wound recommend re evaluation with imaging .  10. Cholelithiasis  11. Chronic medical conditions - DM, HTN, hyperlipidemia, morbidly obese body habitus.     Recommendations  1. Continue Vancomycin  2. Meropenem to Zosyn. Will discontinue if bronc/pleural fluid cxs remain negative  3. follow bronch and pleural cxs remain negative     Hazel Villagomez MD    Interval History   Remains intubated, off pressors, afebrile and with resolving leukocytosis, chest tube with hemorrhagic fluid    Physical Exam    Temp: 97.6  F (36.4  C) Temp src: Axillary  Pulse: 87   Resp: 24 SpO2: 95 % O2 Device: Mechanical Ventilator    Vitals:    01/09/22 0230 01/10/22 0421 01/11/22 0400   Weight: 111.6 kg (246 lb 0.5 oz) 130 kg (286 lb 9.6 oz) 131.2 kg (289 lb 3.9 oz)     Vital Signs with Ranges  Temp:  [97.4  F (36.3  C)-99.6  F (37.6  C)] 97.6  F (36.4  C)  Pulse:  [70-90] 87  Resp:  [21-98] 24  MAP:  [62 mmHg-91 mmHg] 85 mmHg  Arterial Line BP: (101-147)/(44-69) 130/54  FiO2 (%):  [40 %-50 %] 50 %  SpO2:  [91 %-100 %] 95 %    Constitutional: intubated, sedated  Lungs:chest tube in place left sided  Cardiovascular: S1S2  Skin: chronic stasis changes bilateral lower extremities, perineal erythema, intertrigo, excoriated lesions  MS : BL LE lymphedema    Other:    Medications     baclofen (LIORESAL) intraTHECAL Internal Pump       dextrose       epoprostenol (VELETRI) 20 mcg/mL in sterile water inhalation solution 20 ng/kg/min (01/11/22 0749)     fentaNYL 50 mcg/hr (01/11/22 0756)     heparin 2,550 Units/hr (01/11/22 0751)     midazolam Stopped (01/11/22 0902)     norepinephrine Stopped (01/11/22 0100)     propofol (DIPRIVAN) infusion 15 mcg/kg/min (01/11/22 0806)     sodium chloride 20 mL/hr at 01/11/22 0756     vasopressin Stopped (01/11/22 0758)       acetylcysteine  1 mL Nebulization Q6H     albuterol  2.5 mg Nebulization Q6H     amiodarone  400 mg Oral or Feeding Tube Daily     aspirin  81 mg Oral or Feeding Tube Daily     chlorhexidine  15 mL Mouth/Throat Q12H     [Held by provider] DULoxetine  60 mg Oral BID     hydrocortisone sodium succinate PF  50 mg Intravenous Q6H     influenza recomb quadrivalent PF  0.5 mL Intramuscular Prior to discharge     insulin aspart  1-12 Units Subcutaneous Q4H     lactobacillus rhamnosus (GG)  1 capsule Oral BID     meropenem  1 g Intravenous Q12H     [Held by provider] metoprolol tartrate  25 mg Oral or Feeding Tube BID     miconazole   Topical BID     multivitamins w/minerals  15 mL Per  Feeding Tube Daily     nystatin   Topical BID     pantoprazole  40 mg Per Feeding Tube QAM AC    Or     pantoprazole (PROTONIX) IV  40 mg Intravenous QAM AC     [Held by provider] polyethylene glycol  17 g Oral or Feeding Tube Daily     protein modular  2 packet Per Feeding Tube TID     sennosides  5 mL Oral or Feeding Tube BID     sodium chloride  3 mL Nebulization Q6H     sodium chloride (PF)  3 mL Intracatheter Q8H     vancomycin place kim - receiving intermittent dosing  1 each Does not apply See Admin Instructions       Data   All microbiology laboratory data reviewed.  Recent Labs   Lab Test 01/11/22  0527 01/10/22  2343 01/10/22  1728 01/10/22  0615 01/09/22  2054 01/09/22  0603   WBC 17.1*  --   --  21.0*  --  21.3*   HGB 8.1* 6.8* 6.6* 6.9*   < > 8.4*   HCT 27.2*  --   --  24.1*  --  31.5*   MCV 82  --   --  78  --  83     --   --  289  --  329    < > = values in this interval not displayed.     Recent Labs   Lab Test 01/11/22 0527 01/10/22  0615 01/10/22  0239   CR 1.84* 2.03* 2.08*     Recent Labs   Lab Test 12/23/21 2121   SED 14     Component      Latest Ref Rng & Units 1/9/2022   % Neutrophils Fluid      % 88   % Lymphocytes Fluid      % 9   % Mono/Macro Fluid      % 2   % Eosinophils Fluid      % 1   Color Fluid      Colorless, Yellow Red (A)   Appearance Fluid      Clear, Bloody Bloody   WBC Fluid      /uL 2,793   Protein Total Fluid      g/dL 5.2   Lactate Dehydrogenase Fluid      U/L 424      Imaging  1/9 CT chest  EXAM: CT CHEST/ABDOMEN/PELVIS WITH CONTRAST  LOCATION: [facility]  DATE/TIME: 01/09/2022, 3:06 PM     INDICATION: Sepsis.  COMPARISON: 12/23/2021.  TECHNIQUE: CT scan of the chest, abdomen, and pelvis was performed following injection of IV contrast. Multiplanar reformats were obtained. Dose reduction techniques were used.   CONTRAST: 124 mL Isovue-370.      FINDINGS:   LUNGS AND PLEURA: Complete atelectasis of the left lung. There is a large pleural  effusion with evidence of multiple internal loculations. There is high-density within the pleural fluid in the left hemithorax suggesting pockets of hemorrhage. A small   right pleural effusion is present. There is interlobular septal thickening in the right lung and areas of groundglass opacity.     MEDIASTINUM/AXILLAE: Right internal jugular central line terminates in the SVC. An endotracheal tube is appropriately positioned. A feeding tube extends into the distal duodenum. A nasogastric tube terminates in the stomach. The thyroid gland is   unremarkable. No pathologically enlarged thoracic or axillary lymph nodes. Caliber of the thoracic aorta is within normal limits.     CORONARY ARTERY CALCIFICATION: Mild.     HEPATOBILIARY: Gallstones are present in the gallbladder. No worrisome liver lesions.     PANCREAS: Normal.     SPLEEN: Normal.     ADRENAL GLANDS: Normal.     KIDNEYS/BLADDER: Nonobstructing 7 mm calculus in the right renal collecting system. A few tiny calcifications in the left kidney, decreased from prior. No hydronephrosis or worrisome renal mass. Urinary bladder decompressed by a Bobo catheter.     BOWEL: No bowel obstruction or inflammatory change. Normal appendix. No free air or intra-abdominal abscess. No ascites.     LYMPH NODES: Normal.     VASCULATURE: Nonaneurysmal aortic atherosclerosis.     PELVIC ORGANS: Fat-containing lesion in the left pelvis measuring 1.8 cm (series 3, image 262) likely a small dermoid.     MUSCULOSKELETAL: Chronic right femoral neck fracture noted. Lumbosacral fusion hardware noted. No destructive bone lesions.                                                                      IMPRESSION:  1.  There is a large, multiloculated, left pleural effusion with areas of high-density suggesting hemorrhagic contents. The possibility of empyema is not excluded.  2.  Complete atelectasis of the left lung.  3.  Small right pleural effusion associated with groundglass opacities  and interlobular septal thickening in the right lung could be related to infection or pulmonary edema.  4.  Cholelithiasis.  5.  Nonobstructing nephrolithiasis

## 2022-01-11 NOTE — PROGRESS NOTES
1 prbc ordered for hgb 6.6.  cotninues to have some blood drainage from chest tube, but not an excessive amount.  If anything hemodyanmics are improving.

## 2022-01-12 NOTE — PHARMACY-VANCOMYCIN DOSING SERVICE
Pharmacy Vancomycin Note  Date of Service 2022  Patient's  1965   56 year old, female    Indication: Sepsis, HAP  Day of Therapy: 5  Current vancomycin regimen:  intermittent  Current vancomycin monitoring method: trough monitoring  Current vancomycin therapeutic monitoring goal: 15-20    Current estimated CrCl = Estimated Creatinine Clearance: 61.4 mL/min (A) (based on SCr of 1.45 mg/dL (H)).    Creatinine for last 3 days  2022:  2:07 PM Creatinine 1.99 mg/dL;  8:20 PM Creatinine 2.08 mg/dL  1/10/2022:  2:39 AM Creatinine 2.08 mg/dL;  6:15 AM Creatinine 2.03 mg/dL  2022:  5:27 AM Creatinine 1.84 mg/dL  2022:  3:36 AM Creatinine 1.45 mg/dL    Recent Vancomycin Levels (past 3 days)  2022:  8:20 PM Vancomycin 22.5 mg/L  1/10/2022:  9:06 AM Vancomycin 17.2 mg/L  2022:  2:41 PM Vancomycin 26.1 mg/L  2022:  6:03 AM Vancomycin 20.0 mg/L    Vancomycin IV Administrations (past 72 hours)                   vancomycin (VANCOCIN) 2,500 mg in sodium chloride 0.9 % 250 mL intermittent infusion (mg) 2,500 mg New Bag 01/10/22 1228                Nephrotoxins and other renal medications (From now, onward)    Start     Dose/Rate Route Frequency Ordered Stop    22 1300  vancomycin (VANCOCIN) 1000 mg in dextrose 5% 200 mL PREMIX         1,000 mg  200 mL/hr over 1 Hours Intravenous EVERY 24 HOURS 22 1129      22 1400  piperacillin-tazobactam (ZOSYN) 3.375 g vial to attach to  mL bag         3.375 g  over 30 Minutes Intravenous EVERY 6 HOURS 22 1359      22 1100  vasopressin 0.2 units/mL in NS (PITRESSIN) standard conc infusion         2.4 Units/hr  12 mL/hr  Intravenous CONTINUOUS 22 1052      22 0530  norepinephrine (LEVOPHED) 4 mg in  mL infusion PREMIX         0.01-0.6 mcg/kg/min × 121 kg  4.5-272.3 mL/hr  Intravenous CONTINUOUS 22 050               Contrast Orders - past 72 hours (72h ago, onward)            Start      Dose/Rate Route Frequency Ordered Stop    01/09/22 1500  iopamidol (ISOVUE-370) solution 124 mL         124 mL Intravenous ONCE 01/09/22 1433 01/09/22 1445    01/09/22 1430  perflutren diluted 1mL to 2mL with saline (OPTISON) diluted injection 9 mL         9 mL Intravenous ONCE 01/09/22 1412 01/09/22 1412          Interpretation of levels and current regimen:  Vancomycin level is reflective of therapeutic level    Has serum creatinine changed greater than 50% in last 72 hours: No    Urine output:  good urine output    Renal Function: Improving    InsightRX Prediction of Planned New Vancomycin Regimen  Regimen: 1000 mg IV every 24 hours.  Start time: 12:28 on 01/12/2022  Exposure target: AUC24 (range)400-600 mg/L.hr   AUC24,ss: 521 mg/L.hr  Probability of AUC24 > 400: 100 %  Ctrough,ss: 16.5 mg/L  Probability of Ctrough,ss > 20: 8 %  Probability of nephrotoxicity (Lodise ROSEMARIE 2009): 12 %      Plan:  1. restart scheduled regimen or 1g q24h  2. Vancomycin monitoring method: AUC now with improved renal f'n  3. Vancomycin therapeutic monitoring goal: 400-600 mg*h/L  4. Pharmacy will check vancomycin levels as appropriate in 1-3 Days.  5. Serum creatinine levels will be ordered daily for the first week of therapy and at least twice weekly for subsequent weeks.    Silva Chino, [facility]

## 2022-01-12 NOTE — PROGRESS NOTES
Critical access hospital ICU RESPIRATORY NOTE        Date of Admission: 12/23/2021    Date of Intubation (most recent): 1/8/22    Reason for Mechanical Ventilation: Resp failure    Number of Days on Mechanical Ventilation: 4    Met Criteria for Spontaneous Breathing Trial: No    Reason for No Spontaneous Breathing Trial:     Significant Events Today: None    ABG Results:   Recent Labs   Lab 01/12/22  0338 01/11/22  0528 01/10/22  0615 01/09/22  1737   PH 7.33* 7.37 7.40 7.35   PCO2 60* 53* 54* 62*   PO2 139* 59* 114* 116*   HCO3 31* 31* 33* 34*   O2PER 80 40 70 70       Current Vent Settings: Ventilation Mode: CMV/AC  (Continuous Mandatory Ventilation/ Assist Control)  FiO2 (%): 60 %  Rate Set (breaths/minute): 24 breaths/min  Tidal Volume Set (mL): 400 mL  PEEP (cm H2O): 14 cmH2O  Oxygen Concentration (%): (S) 70 %  Resp: (!) 40        Epifanio Hogan, RT

## 2022-01-12 NOTE — PHARMACY-VANCOMYCIN DOSING SERVICE
Pharmacy Vancomycin Note  Date of Service 2022  Patient's  1965   56 year old, female    Indication: Aspiration Pneumonia  Day of Therapy: 4  Current vancomycin regimen:  Intermittent dosing per levels  Current vancomycin monitoring method: Tr & AUC until level stabilizes  Current vancomycin therapeutic monitoring goal: Tr 15-20 or -600    Current estimated CrCl = Estimated Creatinine Clearance: 48.5 mL/min (A) (based on SCr of 1.84 mg/dL (H)).    Creatinine for last 3 days  2022:  6:03 AM Creatinine 1.59 mg/dL;  2:07 PM Creatinine 1.99 mg/dL;  8:20 PM Creatinine 2.08 mg/dL  1/10/2022:  2:39 AM Creatinine 2.08 mg/dL;  6:15 AM Creatinine 2.03 mg/dL  2022:  5:27 AM Creatinine 1.84 mg/dL    Recent Vancomycin Levels (past 3 days)  2022:  9:35 AM Vancomycin 29.0 mg/L;  8:20 PM Vancomycin 22.5 mg/L  1/10/2022:  9:06 AM Vancomycin 17.2 mg/L  2022:  2:41 PM Vancomycin 26.1 mg/L    Vancomycin IV Administrations (past 72 hours)                   vancomycin (VANCOCIN) 2,500 mg in sodium chloride 0.9 % 250 mL intermittent infusion (mg) 2,500 mg New Bag 01/10/22 1228    vancomycin 1250 mg in 0.9% NaCl 250 mL intermittent infusion 1,250 mg (mg) 1,250 mg New Bag 22 2133                Nephrotoxins and other renal medications (From now, onward)    Start     Dose/Rate Route Frequency Ordered Stop    22 1400  piperacillin-tazobactam (ZOSYN) 3.375 g vial to attach to  mL bag         3.375 g  over 30 Minutes Intravenous EVERY 6 HOURS 22 1359      22 1100  vasopressin 0.2 units/mL in NS (PITRESSIN) standard conc infusion         2.4 Units/hr  12 mL/hr  Intravenous CONTINUOUS 22 1052      22 0809  vancomycin place kim - receiving intermittent dosing         1 each Does not apply SEE ADMIN INSTRUCTIONS 22 0810      22 0530  norepinephrine (LEVOPHED) 4 mg in  mL infusion PREMIX         0.01-0.6 mcg/kg/min × 121 kg  4.5-272.3 mL/hr   Intravenous CONTINUOUS 01/08/22 0508               Contrast Orders - past 72 hours (72h ago, onward)            Start     Dose/Rate Route Frequency Ordered Stop    01/09/22 1500  iopamidol (ISOVUE-370) solution 124 mL         124 mL Intravenous ONCE 01/09/22 1433 01/09/22 1445    01/09/22 1430  perflutren diluted 1mL to 2mL with saline (OPTISON) diluted injection 9 mL         9 mL Intravenous ONCE 01/09/22 1412 01/09/22 1412          Interpretation of levels and current regimen:  Vancomycin level is reflective of supratherapeutic level    Has serum creatinine changed greater than 50% in last 72 hours: Yes    Urine output:  improving    Renal Function: JULIANNE -watching closely    Plan:  1. Will hold further dosing tonight for 26hr level=26  2. Vancomycin monitoring method: Tr & AUC until stabilizes  3. Vancomycin therapeutic monitoring goal: Tr 15-20 or -600  4. Pharmacy will check vancomycin levels as appropriate in AM 1/12.    Anh Sweet [facility]

## 2022-01-12 NOTE — PROGRESS NOTES
Assessment and Plan:   JULIANNE: Cr continues to improve. Lytes nominal, phos elevated at 6.9.   Only nephrotoxic exposure currently vanco.     Date/Time Weight Who   01/12/22 0430 130.7 kg (288 lb 2.3 oz) DF   01/11/22 0400 131.2 kg (289 lb 3.9 oz) AO   01/10/22 0421 130 kg (286 lb 9.6 oz)      I/O 2703/3035. UO yests 1425.   ABG: resp acidosis, mild.     No indication for dialysis.  Follow labs.  Stop vanco when able.            Interval History:   Anemia: Hgb 7.2.   Resp failure/mech failure:  S/P chests tube L.   Urosepsis: with shock, MSOF. On vanco and zosyn. On levophed.   MS: neurogenic bladder.      DM  HT  Obesity             Review of Systems:   Intubated.           Medications:       acetylcysteine  1 mL Nebulization Q6H     albuterol  2.5 mg Nebulization Q6H     amiodarone  400 mg Oral or Feeding Tube Daily     aspirin  81 mg Oral or Feeding Tube Daily     chlorhexidine  15 mL Mouth/Throat Q12H     [Held by provider] DULoxetine  60 mg Oral BID     hydrocortisone sodium succinate PF  50 mg Intravenous Q6H     influenza recomb quadrivalent PF  0.5 mL Intramuscular Prior to discharge     insulin aspart  1-12 Units Subcutaneous Q4H     lactobacillus rhamnosus (GG)  1 capsule Oral BID     [Held by provider] metoprolol tartrate  25 mg Oral or Feeding Tube BID     miconazole   Topical BID     multivitamins w/minerals  15 mL Per Feeding Tube Daily     nystatin   Topical BID     pantoprazole  40 mg Per Feeding Tube QAM AC    Or     pantoprazole (PROTONIX) IV  40 mg Intravenous QAM AC     piperacillin-tazobactam  3.375 g Intravenous Q6H     [Held by provider] polyethylene glycol  17 g Oral or Feeding Tube Daily     protein modular  2 packet Per Feeding Tube TID     [Held by provider] sennosides  5 mL Oral or Feeding Tube BID     sodium chloride  3 mL Nebulization Q6H     sodium chloride (PF)  3 mL Intracatheter Q8H     vancomycin place kim - receiving intermittent dosing  1 each Does not apply See Admin  Instructions       baclofen (LIORESAL) intraTHECAL Internal Pump       dextrose       epoprostenol (VELETRI) 20 mcg/mL in sterile water inhalation solution 10 ng/kg/min (01/12/22 0147)     fentaNYL 50 mcg/hr (01/12/22 0800)     heparin Stopped (01/12/22 0325)     midazolam Stopped (01/11/22 0902)     norepinephrine 0.01 mcg/kg/min (01/12/22 1116)     propofol (DIPRIVAN) infusion 15 mcg/kg/min (01/12/22 0845)     sodium chloride 20 mL/hr at 01/11/22 0756     vasopressin Stopped (01/11/22 0758)     Current active medications and PTA medications reviewed, see medication list for details.            Physical Exam:   Vitals were reviewed  Patient Vitals for the past 24 hrs:   Temp Temp src Pulse Resp SpO2 Weight   01/12/22 1116 -- -- 93 23 100 % --   01/12/22 1100 -- -- 95 23 97 % --   01/12/22 1000 -- -- 94 24 97 % --   01/12/22 0900 -- -- 95 23 98 % --   01/12/22 0838 -- -- 96 25 (!) 89 % --   01/12/22 0800 -- -- 90 26 96 % --   01/12/22 0700 -- -- 92 (!) 32 96 % --   01/12/22 0630 -- -- 89 22 -- --   01/12/22 0600 -- -- 87 (!) 36 98 % --   01/12/22 0530 -- -- 86 (!) 40 -- --   01/12/22 0500 -- -- 81 25 97 % --   01/12/22 0430 -- -- 88 24 96 % 130.7 kg (288 lb 2.3 oz)   01/12/22 0400 97.8  F (36.6  C) Oral 93 (!) 45 97 % --   01/12/22 0330 -- -- 90 24 97 % --   01/12/22 0325 -- -- 94 24 96 % --   01/12/22 0300 -- -- 80 24 97 % --   01/12/22 0230 -- -- 79 23 93 % --   01/12/22 0200 -- -- 79 24 93 % --   01/12/22 0130 -- -- 93 24 -- --   01/12/22 0100 -- -- 90 24 91 % --   01/12/22 0030 -- -- 83 23 90 % --   01/12/22 0000 97.9  F (36.6  C) Oral 86 (!) 34 96 % --   01/11/22 2330 -- -- 84 24 97 % --   01/11/22 2300 -- -- 76 23 96 % --   01/11/22 2230 -- -- 81 23 96 % --   01/11/22 2200 -- -- 75 24 91 % --   01/11/22 2130 -- -- 78 24 91 % --   01/11/22 2100 -- -- 84 21 98 % --   01/11/22 2030 -- -- 84 23 91 % --   01/11/22 2000 97.8  F (36.6  C) -- 84 12 91 % --   01/11/22 1930 -- -- 85 13 91 % --   01/11/22 1900 -- -- --  -- (!) 89 % --   22 1830 -- -- -- -- 98 % --   22 1800 97.7  F (36.5  C) Oral 85 24 91 % --   22 1730 -- -- 82 23 -- --   22 1700 -- -- 84 28 -- --   22 1630 -- -- 86 24 -- --   22 1600 97.6  F (36.4  C) Oral 84 26 92 % --   22 1530 -- -- 74 23 95 % --   22 1500 -- -- 74 24 95 % --   22 1430 -- -- 78 24 97 % --   22 1400 -- -- 84 24 100 % --   22 1330 -- -- 86 24 95 % --   22 1300 -- -- 80 24 93 % --   22 1230 -- -- 79 24 98 % --   22 1200 97.5  F (36.4  C) Axillary 79 24 97 % --   22 1130 -- -- 87 24 95 % --       Temp:  [97.5  F (36.4  C)-97.9  F (36.6  C)] 97.8  F (36.6  C)  Pulse:  [74-96] 93  Resp:  [12-45] 23  MAP:  [57 mmHg-99 mmHg] 81 mmHg  Arterial Line BP: ()/(40-74) 134/54  FiO2 (%):  [60 %-70 %] 70 %  SpO2:  [89 %-100 %] 100 %    Temperatures:  Current - Temp: 97.8  F (36.6  C); Max - Temp  Av.7  F (36.5  C)  Min: 97.5  F (36.4  C)  Max: 97.9  F (36.6  C)  Respiration range: Resp  Av.9  Min: 12  Max: 45  Pulse range: Pulse  Av  Min: 74  Max: 96  Blood pressure range: No data recorded.  ; No data recorded.    Pulse oximetry range: SpO2  Av.9 %  Min: 89 %  Max: 100 %    I/O last 3 completed shifts:  In: 1552.47 [I.V.:952.47; NG/GT:360]  Out: 4060 [Urine:1550; Emesis/NG output:700; Stool:550; Chest Tube:1260]      Intake/Output Summary (Last 24 hours) at 2022 1120  Last data filed at 2022 1116  Gross per 24 hour   Intake 1457.73 ml   Output 4750 ml   Net -3292.27 ml       Intubated, obese  L chest tube  UE +1 edema R > L.  LE + stasis changes  Cor RRR nl S1 S2 no M  Lungs clear ant BS       Wt Readings from Last 4 Encounters:   22 130.7 kg (288 lb 2.3 oz)   20 114.3 kg (252 lb)   19 114.3 kg (252 lb)   19 114.3 kg (252 lb)          Data:          Lab Results   Component Value Date     2022     2022     01/10/2022      08/03/2019     06/21/2019     04/16/2019    Lab Results   Component Value Date    CHLORIDE 104 01/12/2022    CHLORIDE 104 01/11/2022    CHLORIDE 102 01/10/2022    CHLORIDE 103 08/03/2019    CHLORIDE 103 06/21/2019    CHLORIDE 102 04/16/2019    Lab Results   Component Value Date    BUN 66 01/12/2022    BUN 59 01/11/2022    BUN 48 01/10/2022    BUN 20 08/03/2019    BUN 17 06/21/2019    BUN 14 04/16/2019      Lab Results   Component Value Date    POTASSIUM 3.5 01/12/2022    POTASSIUM 4.2 01/11/2022    POTASSIUM 4.6 01/10/2022    POTASSIUM 4.0 08/03/2019    POTASSIUM 3.8 06/21/2019    POTASSIUM 3.9 04/16/2019    Lab Results   Component Value Date    CO2 30 01/12/2022    CO2 30 01/11/2022    CO2 31 01/10/2022    CO2 29 08/03/2019    CO2 30 06/21/2019    CO2 32 04/16/2019    Lab Results   Component Value Date    CR 1.45 01/12/2022    CR 1.84 01/11/2022    CR 2.03 01/10/2022    CR 0.47 08/03/2019    CR 0.50 06/21/2019    CR 0.58 04/16/2019        Recent Labs   Lab Test 01/12/22  0336 01/11/22  2207 01/11/22  1441 01/11/22  0527 01/10/22  1728 01/10/22  0615   WBC 23.8*  --   --  17.1*  --  21.0*   HGB 7.2* 7.1* 7.8* 8.1*   < > 6.9*   HCT 24.5*  --   --  27.2*  --  24.1*   MCV 82  --   --  82  --  78     --   --  230  --  289    < > = values in this interval not displayed.     Recent Labs   Lab Test 01/09/22  0603 01/08/22  0147 01/07/22  0628 12/24/21  0403 12/23/21  2121   AST 25 20 23   < > 3,722*   ALT 42 60* 70*   < > >1,000*   ALKPHOS 112 123 121   < > 246*   BILITOTAL 1.3 0.6 0.5   < > 1.5*   ABY  --   --   --   --  49    < > = values in this interval not displayed.       Recent Labs   Lab Test 01/12/22  0336 01/11/22  0527 01/10/22  0615   MAG 2.4* 2.6* 2.3     Recent Labs   Lab Test 01/12/22  0336 01/11/22  0527 01/10/22  0615   PHOS 6.9* 7.3* 6.8*     Recent Labs   Lab Test 01/12/22  0336 01/11/22  0527 01/10/22  0615   MARY 7.7* 7.7* 7.6*       Lab Results   Component Value Date    MARY 7.7 (L)  01/12/2022     Lab Results   Component Value Date    WBC 23.8 (H) 01/12/2022    HGB 7.2 (L) 01/12/2022    HCT 24.5 (L) 01/12/2022    MCV 82 01/12/2022     01/12/2022     Lab Results   Component Value Date     01/12/2022    POTASSIUM 3.5 01/12/2022    CHLORIDE 104 01/12/2022    CO2 30 01/12/2022     (H) 01/12/2022     Lab Results   Component Value Date    BUN 66 (H) 01/12/2022    CR 1.45 (H) 01/12/2022     Lab Results   Component Value Date    MAG 2.4 (H) 01/12/2022     Lab Results   Component Value Date    PHOS 6.9 (H) 01/12/2022       Creatinine   Date Value Ref Range Status   01/12/2022 1.45 (H) 0.52 - 1.04 mg/dL Final   01/11/2022 1.84 (H) 0.52 - 1.04 mg/dL Final   01/10/2022 2.03 (H) 0.52 - 1.04 mg/dL Final   01/10/2022 2.08 (H) 0.52 - 1.04 mg/dL Final   01/09/2022 2.08 (H) 0.52 - 1.04 mg/dL Final   01/09/2022 1.99 (H) 0.52 - 1.04 mg/dL Final   08/03/2019 0.47 (L) 0.52 - 1.04 mg/dL Final   06/21/2019 0.50 (L) 0.52 - 1.04 mg/dL Final   04/16/2019 0.58 0.52 - 1.04 mg/dL Final   04/15/2019 0.57 0.52 - 1.04 mg/dL Final   04/14/2019 0.55 0.52 - 1.04 mg/dL Final   04/13/2019 0.51 (L) 0.52 - 1.04 mg/dL Final       Attestation:  I have reviewed today's vital signs, notes, medications, labs and imaging.     Osmani Mosqueda MD

## 2022-01-12 NOTE — PROGRESS NOTES
[facility] ICU RESPIRATORY NOTE        Date of Admission: 12/23/2021    Date of Intubation (most recent):  1/8/22    Reason for Mechanical Ventilation: Resp failure    Number of Days on Mechanical Ventilation: 5    Met Criteria for Spontaneous Breathing Trial: No    Reason for No Spontaneous Breathing Trial: Per MD    Significant Events Today:  None    ABG Results:   Recent Labs   Lab 01/12/22  0338 01/11/22  0528 01/10/22  0615 01/09/22  1737   PH 7.33* 7.37 7.40 7.35   PCO2 60* 53* 54* 62*   PO2 139* 59* 114* 116*   HCO3 31* 31* 33* 34*   O2PER 80 40 70 70       Current Vent Settings: Ventilation Mode: CMV/AC  (Continuous Mandatory Ventilation/ Assist Control)  FiO2 (%): 60 %  Rate Set (breaths/minute): 24 breaths/min  Tidal Volume Set (mL): 400 mL  PEEP (cm H2O): 14 cmH2O  Oxygen Concentration (%): 60 %  Resp: 18      Plan:  Pt to remain on full vent support overnight    Gio Lujan, RT

## 2022-01-12 NOTE — PROGRESS NOTES
[facility]    Infectious Disease Progress Note    Date of Service : 01/12/2022     Assessment:  1. Acute hypoxic respiratory failure related to hemothorax. S/p bronchoscopy. BAL cxs only show candida albicans , nasal MRSA PCR positive. S/p chest tube placement, exudative hemorrhagic fluid, pleural fluid and blood cxs negative thus far  2. 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 TTE without evidence of endocarditis, blood cxs cleared rapidly .  4. Perineal wounds, candida intertrigo, funguria related to cathetrization  5. JULIANNE, transaminitis related to sepsis improved  6. 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 Has bilateral renal calculi , urology has evaluated and recommended stone management at a later date.  7. S.epidermidis bacteremia likely due to culture contamination. Polymicrobial bacteremia seems unlikely. Pump site does not appear infected, there is no overlying erythema and CT abdomen done twice did not show stranding or fluid collection around pump site.   8. Atrial fibrillation, on amiodarone and in sinus rhythm  9. Densities along the pelvic sidewall, unclear whether chronic fluid collection/seroma. or adenopathy. In view of sepsis, wound recommend re evaluation with imaging .  10. Cholelithiasis  11. Chronic medical conditions - DM, HTN, hyperlipidemia, morbidly obese body habitus.     Recommendations  1. Continue vancomycin/Zosyn  2. Renal function improved, increase dose of zosyn  3. Follow bronch and pleural fluid cxs  ID will continue to follow      Hazel Villagomez MD    Interval History   Remains sedated, intubated, off pressors    Physical Exam   Temp: 97.8  F (36.6  C) Temp src: Oral  Pulse:  89   Resp: 22 SpO2: 98 % O2 Device: Mechanical Ventilator    Vitals:    01/10/22 0421 01/11/22 0400 01/12/22 0430   Weight: 130 kg (286 lb 9.6 oz) 131.2 kg (289 lb 3.9 oz) 130.7 kg (288 lb 2.3 oz)     Vital Signs with Ranges  Temp:  [97.5  F (36.4  C)-97.9  F (36.6  C)] 97.8  F (36.6  C)  Pulse:  [74-94] 89  Resp:  [12-45] 22  MAP:  [57 mmHg-99 mmHg] 85 mmHg  Arterial Line BP: ()/(40-74) 136/61  FiO2 (%):  [60 %] 60 %  SpO2:  [89 %-100 %] 98 %    Constitutional: intubated, sedated  Lungs:chest tube in place left sided  Cardiovascular: S1S2  Skin: chronic stasis changes bilateral lower extremities, perineal erythema, intertrigo, excoriated lesions  MS : BL LE lymphedema    Other:    Medications     baclofen (LIORESAL) intraTHECAL Internal Pump       dextrose       epoprostenol (VELETRI) 20 mcg/mL in sterile water inhalation solution 10 ng/kg/min (01/12/22 0147)     fentaNYL 50 mcg/hr (01/12/22 0600)     heparin Stopped (01/12/22 0325)     midazolam Stopped (01/11/22 0902)     norepinephrine 0.03 mcg/kg/min (01/12/22 0600)     propofol (DIPRIVAN) infusion 15 mcg/kg/min (01/12/22 0600)     sodium chloride 20 mL/hr at 01/11/22 0756     vasopressin Stopped (01/11/22 0758)       acetylcysteine  1 mL Nebulization Q6H     albuterol  2.5 mg Nebulization Q6H     amiodarone  400 mg Oral or Feeding Tube Daily     aspirin  81 mg Oral or Feeding Tube Daily     chlorhexidine  15 mL Mouth/Throat Q12H     [Held by provider] DULoxetine  60 mg Oral BID     hydrocortisone sodium succinate PF  50 mg Intravenous Q6H     influenza recomb quadrivalent PF  0.5 mL Intramuscular Prior to discharge     insulin aspart  1-12 Units Subcutaneous Q4H     lactobacillus rhamnosus (GG)  1 capsule Oral BID     [Held by provider] metoprolol tartrate  25 mg Oral or Feeding Tube BID     miconazole   Topical BID     multivitamins w/minerals  15 mL Per Feeding Tube Daily     nystatin   Topical BID     pantoprazole  40 mg Per Feeding Tube QAM AC    Or      pantoprazole (PROTONIX) IV  40 mg Intravenous QAM AC     piperacillin-tazobactam  3.375 g Intravenous Q6H     [Held by provider] polyethylene glycol  17 g Oral or Feeding Tube Daily     protein modular  2 packet Per Feeding Tube TID     sennosides  5 mL Oral or Feeding Tube BID     sodium chloride  3 mL Nebulization Q6H     sodium chloride (PF)  3 mL Intracatheter Q8H     vancomycin place kim - receiving intermittent dosing  1 each Does not apply See Admin Instructions       Data   All microbiology laboratory data reviewed.  Recent Labs   Lab Test 01/12/22  0336 01/11/22  2207 01/11/22  1441 01/11/22  0527 01/10/22  1728 01/10/22  0615   WBC 23.8*  --   --  17.1*  --  21.0*   HGB 7.2* 7.1* 7.8* 8.1*   < > 6.9*   HCT 24.5*  --   --  27.2*  --  24.1*   MCV 82  --   --  82  --  78     --   --  230  --  289    < > = values in this interval not displayed.     Recent Labs   Lab Test 01/12/22 0336 01/11/22  0527 01/10/22  0615   CR 1.45* 1.84* 2.03*     Recent Labs   Lab Test 12/23/21  2121   SED 14     Microbiology  1/9 urine cx  Urine, Bobo Catheter          0 Result Notes    Culture >100,000 CFU/mL Candida albicans Abnormal           Imaging  1/12 CXR  EXAM: XR CHEST PORT 1 VIEW  LOCATION: [facility]  DATE/TIME: 1/12/2022 5:17 AM     INDICATION: Left hemothorax  COMPARISON: 01/11/2022                                                                      IMPRESSION: No interval change. Endotracheal tube terminates at 5.6 cm from the ron. Enteric tubes terminating below diaphragm and field of view. Right-sided central venous catheter with tip terminating over SVC. Left-sided chest tube unchanged in   position. Unchanged moderate left hemothorax. Stable cardiomediastinal silhouette with atherosclerotic vascular calcifications    1/9 CT chest  EXAM: CT CHEST/ABDOMEN/PELVIS WITH CONTRAST  LOCATION: [facility]  DATE/TIME: 01/09/2022, 3:06 PM     INDICATION:  Sepsis.  COMPARISON: 12/23/2021.  TECHNIQUE: CT scan of the chest, abdomen, and pelvis was performed following injection of IV contrast. Multiplanar reformats were obtained. Dose reduction techniques were used.   CONTRAST: 124 mL Isovue-370.      FINDINGS:   LUNGS AND PLEURA: Complete atelectasis of the left lung. There is a large pleural effusion with evidence of multiple internal loculations. There is high-density within the pleural fluid in the left hemithorax suggesting pockets of hemorrhage. A small   right pleural effusion is present. There is interlobular septal thickening in the right lung and areas of groundglass opacity.     MEDIASTINUM/AXILLAE: Right internal jugular central line terminates in the SVC. An endotracheal tube is appropriately positioned. A feeding tube extends into the distal duodenum. A nasogastric tube terminates in the stomach. The thyroid gland is   unremarkable. No pathologically enlarged thoracic or axillary lymph nodes. Caliber of the thoracic aorta is within normal limits.     CORONARY ARTERY CALCIFICATION: Mild.     HEPATOBILIARY: Gallstones are present in the gallbladder. No worrisome liver lesions.     PANCREAS: Normal.     SPLEEN: Normal.     ADRENAL GLANDS: Normal.     KIDNEYS/BLADDER: Nonobstructing 7 mm calculus in the right renal collecting system. A few tiny calcifications in the left kidney, decreased from prior. No hydronephrosis or worrisome renal mass. Urinary bladder decompressed by a Bobo catheter.     BOWEL: No bowel obstruction or inflammatory change. Normal appendix. No free air or intra-abdominal abscess. No ascites.     LYMPH NODES: Normal.     VASCULATURE: Nonaneurysmal aortic atherosclerosis.     PELVIC ORGANS: Fat-containing lesion in the left pelvis measuring 1.8 cm (series 3, image 262) likely a small dermoid.     MUSCULOSKELETAL: Chronic right femoral neck fracture noted. Lumbosacral fusion hardware noted. No destructive bone  lesions.                                                                      IMPRESSION:  1.  There is a large, multiloculated, left pleural effusion with areas of high-density suggesting hemorrhagic contents. The possibility of empyema is not excluded.  2.  Complete atelectasis of the left lung.  3.  Small right pleural effusion associated with groundglass opacities and interlobular septal thickening in the right lung could be related to infection or pulmonary edema.  4.  Cholelithiasis.  5.  Nonobstructing nephrolithiasis

## 2022-01-12 NOTE — PROGRESS NOTES
[facility] ICU PROGRESS NOTE  01/12/2022        Date of Service (when I saw the patient): 01/12/2022    ASSESSMENT:  Amanda Richardson is a 56 year old female with history of MS with baclofen pump in place, neurogenic bladder, recurrent UTIs, chronic pain syndrome, DM II, hypertension, hyperlipidemia, CKD, Non-hodgkin's lymphoma, Obesity who was admitted on 12/23/21 after presenting to ED with altered mental status. Patient was intubated, treated for septic shock due to UTI, blood Cx (+) E fecalis. She was extubated 12/30/21, transferred to floor 12/31/21.  S/p  left therapeutic thoracentesis (500 ml) on 1/5/22 for hypoxia . RRT 1/8/22 for severe hypoxia prompting intubation and ICU transfer. Septic shock, ARDS and hypoxic  Respiratory failure.     CHANGES and MAJOR THINGS TODAY:   Trend hemoglobin   Confer with Thoracic regarding CT output   Wean Veleteri to off   Titrate fiO2 based on sats  Follow culture results, cont vanco/zosyn, discussed with ID this am.      PLAN:    Neuro/ pain/ sedation:  # Acute pain  # Sedation for ventilator compliance  # MS, s/p baclofen pump  # Acute metabolic encephalopathy  # Depression   - Pain: Fentanyl infusion, oxycodone PRN, Fentanyl IV PRN.    - wean as able. TONIA -1 to -2.   - Sedation: Propofol  Gtt   -  Midazolam gtt off  PRN Midazolam IVP   - Encephalopathy documented in prior hospitalist notes prior to intubation  - Cymbalta on hold given inability to crush while intubated      Pulmonary care:   # Acute hypoxemic hypercarbic respiratory failure, intubated 1/8  # HCAP  # Left pleural effusion, s/p thoracentesis 1/5  # Mucous plugging, s/p bronchoscopy 1/8 and 1/9   # left hemothorax s/p CT 1/9/22   Ventilation Mode: CMV/AC  (Continuous Mandatory Ventilation/ Assist Control)  FiO2 (%): 70 %  Rate Set (breaths/minute): 24 breaths/min  Tidal Volume Set (mL): 400 mL  PEEP (cm H2O): 14 cmH2O  Oxygen Concentration (%): 65 %  Resp: 23    - VT 6 ml/kg of IBW.  - decrease Veletri  until off   - Several bronchs for mucous plugging   -             Continue mucolytic therapy with Mucomyst and Albuterol and CPT.  - Chest tube in Left chest - daily CXR  - CT of chest 1/9/22 with complete atelectasis of left lung, multiple loculations               - unclear when hemothorax occured, question if from prior thoracentesis on 1/5/22  - thoracic consulted for left hemothorax 1.26mL/24 hours from chest tube, heparin held given increased bleeding this am.       Cardiovascular:    # Septic shock   # Paroxysmal atrial fibrillation  # CHF  # Hyperlipidemia  - Monitor hemodynamic status. Goal MAP >65. Norepi/Vasopressin (off this am around 6 am)   -  Stress dose steroids 50 mg every 6 hours             - Continue PO Amiodarone. Lytes replaced.  K>4.0,Mag >2.5  - Continue 81 mg Aspirin  - 1/1 ECHO Normal LV function and seize. EF 60-65%. RV mildly dilated.   - 1/9: normal EF 65-70. left ventricle normal. Left ventricular systolic function is normal. No regional wall motion abnormalities noted. right ventricle is normal in size and function.     GI:   # Severe protein calorie malnutrition  # Cholelithiasis   # Diarrhea, s/p rectal tube   - PPI  - Continue TF- RD following  - Cholelithiasis present without cholecystitis in CT A/P 1/3. Repeat hepatic panel in the setting of fever, increasing pressor requirements and leukocytosis.   - CTABP 1/9/22:   left pleural effusion with areas of high-density suggesting hemorrhagic contents. Complete atelectasis of the left lung. Small right pleural effusion associated with groundglass opacities and interlobular septal thickening in the right lung could be related to infection or pulmonary edema. Cholelithiasis and Nonobstructing nephrolithiasis.  - LFT's normal and bilirubin normal.     Renal/ Fluid Balance:    # JULIANNE   # hypervolemia  - Creatinine baseline 0.64, peaked at 2.03, now down-trending.   - 1/9/22:  FeNA ~ 0.1%  - 1/3 CT A/P with non obstructing renal stones.   -  1/9/21:  US renal: No hydronephrosis, non-obstructing stones.                - seen by urology 1/5, no acute intervention as stone non-obstructing and no evidence of hydronephrosis of US, follow up as OP for definite stone removal with Dr Sapp.   - renal following, appreciate cares and recommendations.      Endocrine:    # DM II  # Relative adrenal insufficiency   - holding  PTA oral agents   - Sliding scale for diabetes management. Goal to keep BG <180.   - High intensity insulin   - continue with solu-cortef 50mg every 6 hour taper when off pressors      ID/ Antibiotics:  # HCAP  # Enterococcus faecalis bacteremia 2/2 urinary source- treated - Completed E fecalis bacteremia treatment 1/7  - Antibiotics: Vancomcyn and Zosyn   - ID following, discussed with ID. No indication for anti-fungals at this time.     Culture:   - 1/8:  MRSA swab positive 1/8   - COVID-19 PCR negative.   - 1/9: BC x 2, UA with reflex  - 1/9: Cultures pending: BAL.   - 1/5: pleural fluid culture negative.      Heme:   # RIJ DVT (1/5)   # LIJ DVT  # superficial thrombus in cephalic vein from mid to proximal forearm  1/5  # Anemia of chronic illness  # Anemia due to blood loss   - Vascular surgery consulted 1/5/22. Continue with tx of heparin gtt, plan for 12 weeks total for DVT's.   - Continue heparin infusion for RIJ DVT                - line still in place as functional and  L  internal jugular DVT noted on beside US 1/8, when off pressors, will remove line.   - total of 4 units this admission, will monitor and trend hgb. Hold heparin gtt at this this due to increased bleeding from chest tube. Trend hgb today        Lines/ tubes/ drains:  - RIJ TLC, L axillary arterial line, ETT, ramos, [place].      Disposition:  - [facility]      Time spent on this Encounter   Billing:  I spent 52 minutes bedside and on the inpatient unit today managing the critical care of Amanda Richardson in relation to the issues listed in this note.    Ruiz  HerJay    ====================================  INTERVAL HISTORY:  Course reviewed. >1 L output overnight from left chest tube, heparin gtt held.  Patient arousing some, moving extremities. Not able to perform ROS.     OBJECTIVE:   1. VITAL SIGNS:   Temp:  [97.5  F (36.4  C)-97.9  F (36.6  C)] 97.8  F (36.6  C)  Pulse:  [74-96] 93  Resp:  [12-45] 23  MAP:  [57 mmHg-99 mmHg] 81 mmHg  Arterial Line BP: ()/(40-74) 134/54  FiO2 (%):  [60 %-70 %] 70 %  SpO2:  [89 %-100 %] 100 %  Ventilation Mode: CMV/AC  (Continuous Mandatory Ventilation/ Assist Control)  FiO2 (%): 70 %  Rate Set (breaths/minute): 24 breaths/min  Tidal Volume Set (mL): 400 mL  PEEP (cm H2O): 14 cmH2O  Oxygen Concentration (%): 65 %  Resp: 23    2. INTAKE/ OUTPUT:   I/O last 3 completed shifts:  In: 1552.47 [I.V.:952.47; NG/GT:360]  Out: 4060 [Urine:1550; Emesis/NG output:700; Stool:550; Chest Tube:1260]    3. PHYSICAL EXAMINATION:  General: sedated, arouses to noxious stimuli.   HEENT: pupils equal and reactive. ETT present and secured. ETT present and secured  Neuro: seated, TURCIOS.   Pulm/Resp: decreased breath sound in left lower lungs, chest tube with bloody serosanguinous drainage.   CV: RRR, S1/S2   Abdomen: abdomen soft and compressible.   :  (+) ramos catheter in place,urine yellow and clear, rectal tube in place.   Incisions/Skin: groins/folds reddened, chronic skin changes in BLE.   MSK/Extremities: chronic lymphedema in BLE, generalized peripheral edema, pulses 2+.     4. INVESTIGATIONS:   Arterial Blood Gases   Recent Labs   Lab 01/12/22  0338 01/11/22  0528 01/10/22  0615 01/09/22  1737   PH 7.33* 7.37 7.40 7.35   PCO2 60* 53* 54* 62*   PO2 139* 59* 114* 116*   HCO3 31* 31* 33* 34*     Complete Blood Count   Recent Labs   Lab 01/12/22  0336 01/11/22  2207 01/11/22  1441 01/11/22  0527 01/10/22  1728 01/10/22  0615 01/09/22  2054 01/09/22  0603   WBC 23.8*  --   --  17.1*  --  21.0*  --  21.3*   HGB 7.2* 7.1* 7.8* 8.1*   < > 6.9*   < >  8.4*     --   --  230  --  289  --  329    < > = values in this interval not displayed.     Basic Metabolic Panel  Recent Labs   Lab 01/12/22  0856 01/12/22  0346 01/12/22  0336 01/11/22  2358 01/11/22  0905 01/11/22  0527 01/10/22  0900 01/10/22  0615 01/10/22  0409 01/10/22  0239   NA  --   --  142  --   --  141  --  140  --  139   POTASSIUM  --   --  3.5  --   --  4.2  --  4.6  --  4.7   CHLORIDE  --   --  104  --   --  104  --  102  --  103   CO2  --   --  30  --   --  30  --  31  --  32   BUN  --   --  66*  --   --  59*  --  48*  --  45*   CR  --   --  1.45*  --   --  1.84*  --  2.03*  --  2.08*   * 167* 193* 164*   < > 176*   < > 177*   < > 179*    < > = values in this interval not displayed.     Liver Function Tests  Recent Labs   Lab 01/09/22  1612 01/09/22  0603 01/08/22  0147 01/07/22  0628 01/06/22  0621   AST  --  25 20 23 28   ALT  --  42 60* 70* 93*   ALKPHOS  --  112 123 121 135   BILITOTAL  --  1.3 0.6 0.5 0.5   ALBUMIN  --  1.7* 2.0* 2.0* 2.1*   INR 1.37*  --   --   --   --      Pancreatic Enzymes  No lab results found in last 7 days.  Coagulation Profile  Recent Labs   Lab 01/09/22  1612   INR 1.37*     5. RADIOLOGY:   Recent Results (from the past 24 hour(s))   XR Chest Port 1 View    Narrative    EXAM: XR CHEST PORT 1 VIEW  LOCATION: [facility]  DATE/TIME: 1/12/2022 5:17 AM    INDICATION: Left hemothorax  COMPARISON: 01/11/2022      Impression    IMPRESSION: No interval change. Endotracheal tube terminates at 5.6 cm from the ron. Enteric tubes terminating below diaphragm and field of view. Right-sided central venous catheter with tip terminating over SVC. Left-sided chest tube unchanged in   position. Unchanged moderate left hemothorax. Stable cardiomediastinal silhouette with atherosclerotic vascular calcifications.       =========================================

## 2022-01-12 NOTE — PLAN OF CARE
Neuro:  Sedate on Propofol, and fentanyl gtt, RASS goal adjusted. Patient is more wake up, still does not follow.  CV: SR, BP soft, MAP < 65, Levo restarted at low doses.  HG check prior midnight due to increasing bloody output from CT. Dressing to CT change at 0320, this morning HB 7.2    Resp:  Vented, 1/2 straight  veletri.  Lungs coarse and diminished on left, moderate to large amount to oral and inline secretion. Chest tube with dark red drainage.    Heparin gtt for DVT was stopped per MD Oliveros due to increase bloody output from CT..  GI/:   TF at goal, Bobo in place  UOP improved after NS bolus and scheduled albumin.Rectal tube In place   Vasc: L PIV x 1, R internal jugular x 3 and L A line  Gtt:  Propofol, fentanyl, and Levo continous.  Skin:  Numerous areas of improved rash, bruising, excoriation, and peeling treated per order.

## 2022-01-12 NOTE — PROGRESS NOTES
[facility]  Vascular Medicine Progress Note            Assessment and Plan:       Amanda Richardson is a 56 year old female with history of MS with baclofen pump in place, neurogenic bladder, recurrent UTIs, chronic pain syndrome, DM II, hypertension, hyperlipidemia, CKD, Non-hodgkin's lymphoma, Obesity who was admitted on 12/23/21 after presenting to ED with altered mental status. Patient was intubated, treated for septic shock due to UTI, blood Cx (+) E fecalis. She was extubated 12/30/21, transferred to floor 12/31/21.   She had progressive hypoxia, underwent left therapeutic thoracentesis (500 ml). RRT 1/8/22 for severe hypoxia prompting intubation and ICU transfer.        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    Acute hypoxemic hypercarbic respiratory failure intubated again on January 8, 2021    Status post bronchoscopy for mucous plugging on January 8 and January 9    HCAP    Left-sided hemothorax status post CT January 9, 2022    Anemia hemoglobin 6.6 requiring transfusion today --> 8.2 and today 7.1        -The patient is symptomatic from the above catheter associated IJ and right upper extremity DVT and SVT.  One treatment approach would simply be to remove the offending catheters and undertakes serial imaging.  her HGB dropped requiring transfusion and 24 hrs later HGB again at 7.1     Discussed with ICU provider yesterday . Remove central line and  Consider PICC line if able and stop anticoagulation and serial venous duplex , elevate arm etc     .  The likelihood of upper extremity embolization to the lungs is extremely low. Would not presently recommended obtaining a PE protocol CT of the chest.     -She has pleural effusions. S/P repeat thoracentesis. Chest tube in place now.     -Eventually, when all procedures are done, she can be transitioned to therapeutic oral anticoagulation in the form of a DOAC.  She  is morbidly obese, but is not at a weight that exceeds the upper limit of recommended utilization for DOACs.    -No hypercoagulable workup is warranted as these are catheter-associated thrombotic events.      -If able to tolerate anticoagulation, would recommend a minimum of 12 weeks (3 months) of anticoagulation.        Critical care time spent 35 minutes today     Nadir Torres MD, [facility], [facility]  Vascular Medicine                    Interval History:   She is intubated, sedated on vent  Hemoglobin dropped 6.6 yesterday  received transfusion improved hemoglobin to 8.2 then followed by 7.1 this morning  CT tube in place  Getting IV antibiotics  Elevated procalcitonin  Creatinine 1.45               Review of Systems:   Unable to get ROS as she is intubated and sedated.              Medications:       acetylcysteine  1 mL Nebulization Q6H     albuterol  2.5 mg Nebulization Q6H     amiodarone  400 mg Oral or Feeding Tube Daily     aspirin  81 mg Oral or Feeding Tube Daily     chlorhexidine  15 mL Mouth/Throat Q12H     [Held by provider] DULoxetine  60 mg Oral BID     hydrocortisone sodium succinate PF  50 mg Intravenous Q6H     influenza recomb quadrivalent PF  0.5 mL Intramuscular Prior to discharge     insulin aspart  1-12 Units Subcutaneous Q4H     lactobacillus rhamnosus (GG)  1 capsule Oral BID     [Held by provider] metoprolol tartrate  25 mg Oral or Feeding Tube BID     miconazole   Topical BID     multivitamins w/minerals  15 mL Per Feeding Tube Daily     nystatin   Topical BID     pantoprazole  40 mg Per Feeding Tube QAM AC    Or     pantoprazole (PROTONIX) IV  40 mg Intravenous QAM AC     piperacillin-tazobactam  3.375 g Intravenous Q6H     [Held by provider] polyethylene glycol  17 g Oral or Feeding Tube Daily     protein modular  2 packet Per Feeding Tube TID     [Held by provider] sennosides  5 mL Oral or Feeding Tube BID     sodium chloride  3 mL Nebulization Q6H     sodium chloride (PF)  3 mL  Intracatheter Q8H     vancomycin place kim - receiving intermittent dosing  1 each Does not apply See Admin Instructions                  Physical Exam:     Vitals were reviewed  Patient Vitals for the past 24 hrs:   Temp Temp src Pulse Resp SpO2 Weight   01/12/22 0900 -- -- 95 23 98 % --   01/12/22 0838 -- -- 96 25 (!) 89 % --   01/12/22 0800 -- -- 90 26 96 % --   01/12/22 0700 -- -- 92 (!) 32 96 % --   01/12/22 0630 -- -- 89 22 -- --   01/12/22 0600 -- -- 87 (!) 36 98 % --   01/12/22 0530 -- -- 86 (!) 40 -- --   01/12/22 0500 -- -- 81 25 97 % --   01/12/22 0430 -- -- 88 24 96 % 130.7 kg (288 lb 2.3 oz)   01/12/22 0400 97.8  F (36.6  C) Oral 93 (!) 45 97 % --   01/12/22 0330 -- -- 90 24 97 % --   01/12/22 0325 -- -- 94 24 96 % --   01/12/22 0300 -- -- 80 24 97 % --   01/12/22 0230 -- -- 79 23 93 % --   01/12/22 0200 -- -- 79 24 93 % --   01/12/22 0130 -- -- 93 24 -- --   01/12/22 0100 -- -- 90 24 91 % --   01/12/22 0030 -- -- 83 23 90 % --   01/12/22 0000 97.9  F (36.6  C) Oral 86 (!) 34 96 % --   01/11/22 2330 -- -- 84 24 97 % --   01/11/22 2300 -- -- 76 23 96 % --   01/11/22 2230 -- -- 81 23 96 % --   01/11/22 2200 -- -- 75 24 91 % --   01/11/22 2130 -- -- 78 24 91 % --   01/11/22 2100 -- -- 84 21 98 % --   01/11/22 2030 -- -- 84 23 91 % --   01/11/22 2000 97.8  F (36.6  C) -- 84 12 91 % --   01/11/22 1930 -- -- 85 13 91 % --   01/11/22 1900 -- -- -- -- (!) 89 % --   01/11/22 1830 -- -- -- -- 98 % --   01/11/22 1800 97.7  F (36.5  C) Oral 85 24 91 % --   01/11/22 1730 -- -- 82 23 -- --   01/11/22 1700 -- -- 84 28 -- --   01/11/22 1630 -- -- 86 24 -- --   01/11/22 1600 97.6  F (36.4  C) Oral 84 26 92 % --   01/11/22 1530 -- -- 74 23 95 % --   01/11/22 1500 -- -- 74 24 95 % --   01/11/22 1430 -- -- 78 24 97 % --   01/11/22 1400 -- -- 84 24 100 % --   01/11/22 1330 -- -- 86 24 95 % --   01/11/22 1300 -- -- 80 24 93 % --   01/11/22 1230 -- -- 79 24 98 % --   01/11/22 1200 97.5  F (36.4  C) Axillary 79 24 97 % --    01/11/22 1130 -- -- 87 24 95 % --   01/11/22 1100 -- -- 81 23 96 % --   01/11/22 1030 -- -- 75 23 96 % --   01/11/22 1000 -- -- 77 24 95 % --     Wt Readings from Last 4 Encounters:   01/12/22 130.7 kg (288 lb 2.3 oz)   01/29/20 114.3 kg (252 lb)   07/29/19 114.3 kg (252 lb)   07/17/19 114.3 kg (252 lb)       Intake/Output Summary (Last 24 hours) at 1/6/2022 1033  Last data filed at 1/6/2022 0600  Gross per 24 hour   Intake 2860 ml   Output 2950 ml   Net -90 ml     Constitutional: intubated/sedated.   Eyes: normal  ENT: normal  Neck: Supple, symmetrical. Right internal jugular central line in place.   Back: normal  Lungs: decreased BS anteriorly. Chest tube in place.   Cardiovascular: Regular rate and rhythm, normal S1 and S2, no S3 or S4, and no murmur noted.  Abdomen: obese.   Musculoskeletal: RUE w/ significant enlargement compared to left; Not tense or taut  Neurologic: Sedated  Neuropsychiatric: Sedated.   Skin: normal           Data:     Results for orders placed or performed during the hospital encounter of 12/23/21 (from the past 24 hour(s))   Glucose by meter   Result Value Ref Range    GLUCOSE BY METER POCT 127 (H) 70 - 99 mg/dL   Hemoglobin   Result Value Ref Range    Hemoglobin 7.8 (L) 11.7 - 15.7 g/dL   Vancomycin level   Result Value Ref Range    Vancomycin 26.1 (HH)   mg/L   Glucose by meter   Result Value Ref Range    GLUCOSE BY METER POCT 125 (H) 70 - 99 mg/dL   Glucose by meter   Result Value Ref Range    GLUCOSE BY METER POCT 152 (H) 70 - 99 mg/dL   Hemoglobin   Result Value Ref Range    Hemoglobin 7.1 (L) 11.7 - 15.7 g/dL   Glucose by meter   Result Value Ref Range    GLUCOSE BY METER POCT 164 (H) 70 - 99 mg/dL   CBC with platelets   Result Value Ref Range    WBC Count 23.8 (H) 4.0 - 11.0 10e3/uL    RBC Count 2.98 (L) 3.80 - 5.20 10e6/uL    Hemoglobin 7.2 (L) 11.7 - 15.7 g/dL    Hematocrit 24.5 (L) 35.0 - 47.0 %    MCV 82 78 - 100 fL    MCH 24.2 (L) 26.5 - 33.0 pg    MCHC 29.4 (L) 31.5 - 36.5  g/dL    RDW 22.1 (H) 10.0 - 15.0 %    Platelet Count 295 150 - 450 10e3/uL   Basic metabolic panel   Result Value Ref Range    Sodium 142 133 - 144 mmol/L    Potassium 3.5 3.4 - 5.3 mmol/L    Chloride 104 94 - 109 mmol/L    Carbon Dioxide (CO2) 30 20 - 32 mmol/L    Anion Gap 8 3 - 14 mmol/L    Urea Nitrogen 66 (H) 7 - 30 mg/dL    Creatinine 1.45 (H) 0.52 - 1.04 mg/dL    Calcium 7.7 (L) 8.5 - 10.1 mg/dL    Glucose 193 (H) 70 - 99 mg/dL    GFR Estimate 42 (L) >60 mL/min/1.73m2   Magnesium   Result Value Ref Range    Magnesium 2.4 (H) 1.6 - 2.3 mg/dL   Phosphorus   Result Value Ref Range    Phosphorus 6.9 (H) 2.5 - 4.5 mg/dL   Heparin Unfractionated Anti Xa Level   Result Value Ref Range    Anti Xa Unfractionated Heparin 0.53 For Reference Range, See Comment IU/mL    Narrative    Therapeutic Range: UFH: 0.25-0.50 IU/mL for low intensity dosing,  0.30-0.70 IU/mL for high intensity dosing DVT and PE.  This test is not validated for other direct factor X inhibitors (e.g. rivaroxaban, apixaban, edoxaban, betrixaban, fondaparinux) and should not be used for monitoring of other medications.   Procalcitonin   Result Value Ref Range    Procalcitonin 0.57 (H) <0.05 ng/mL   Blood gas arterial   Result Value Ref Range    pH Arterial 7.33 (L) 7.35 - 7.45    pCO2 Arterial 60 (H) 35 - 45 mm Hg    pO2 Arterial 139 (H) 80 - 105 mm Hg    FIO2 80     Bicarbonate Arterial 31 (H) 21 - 28 mmol/L    Base Excess/Deficit (+/-) 4.6 (H) -9.0 - 1.8 mmol/L   Glucose by meter   Result Value Ref Range    GLUCOSE BY METER POCT 167 (H) 70 - 99 mg/dL   XR Chest Port 1 View    Narrative    EXAM: XR CHEST PORT 1 VIEW  LOCATION: [facility]  DATE/TIME: 1/12/2022 5:17 AM    INDICATION: Left hemothorax  COMPARISON: 01/11/2022      Impression    IMPRESSION: No interval change. Endotracheal tube terminates at 5.6 cm from the ron. Enteric tubes terminating below diaphragm and field of view. Right-sided central venous catheter with  tip terminating over SVC. Left-sided chest tube unchanged in   position. Unchanged moderate left hemothorax. Stable cardiomediastinal silhouette with atherosclerotic vascular calcifications.   Vancomycin level   Result Value Ref Range    Vancomycin 20.0   mg/L   Glucose by meter   Result Value Ref Range    GLUCOSE BY METER POCT 160 (H) 70 - 99 mg/dL     *Note: Due to a large number of results and/or encounters for the requested time period, some results have not been displayed. A complete set of results can be found in Results Review.

## 2022-01-13 NOTE — PLAN OF CARE
Neuro:  Sedate on Propofol, and fentanyl gtt, RASS goal adjusted. Patient is more wake up, still does not follow but moving L foot , shoulders.   CV: SR, VSS, 1 unit RBC given , HG check  7.6.  Resp:  Vented, Lungs coarse and diminished on left, moderate amount to oral and inline secretion. Chest tube with dark red drainage.    GI/:   TF at goal, Bobo in place  UOP improved after NS bolus and scheduled albumin.Rectal tube In place   Vasc: R internal jugular x 3 and L A line  Gtt:  Propofol and  fentanyl continuous.  Skin:  Numerous areas of improved rash, bruising, excoriation, and peeling treated per order.

## 2022-01-13 NOTE — PROGRESS NOTES
[facility]  Vascular Medicine Progress Note            Assessment and Plan:       Amanda Richardson is a 56 year old female with history of MS with baclofen pump in place, neurogenic bladder, recurrent UTIs, chronic pain syndrome, DM II, hypertension, hyperlipidemia, CKD, Non-hodgkin's lymphoma, Obesity who was admitted on 12/23/21 after presenting to ED with altered mental status. Patient was intubated, treated for septic shock due to UTI, blood Cx (+) E fecalis. She was extubated 12/30/21, transferred to floor 12/31/21.   She had progressive hypoxia, underwent left therapeutic thoracentesis (500 ml). RRT 1/8/22 for severe hypoxia prompting intubation and ICU transfer.        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    Acute hypoxemic hypercarbic respiratory failure intubated again on January 8, 2021    Status post bronchoscopy for mucous plugging on January 8 and January 9    HCAP    Left-sided hemothorax status post CT January 9, 2022    Anemia hemoglobin requiring periodic  transfusions        -The patient is symptomatic from the above catheter associated IJ and right upper extremity DVT and SVT.  One treatment approach would simply be to remove the offending catheters and undertake serial imaging.  her HGB dropped requiring periodic transfusions     Discussed with ICU provider today.  If possible remove central line and  Consider PICC line  And agree stopping  anticoagulation due to ongoing bleeding requiring transfusions, monitor serial venous duplex , elevate arm etc     .  The likelihood of upper extremity embolization to the lungs is extremely low. Would not presently recommended obtaining a PE protocol CT of the chest.     -She has pleural effusions. S/P repeat thoracentesis. Chest tube in place now.     -Eventually, when all procedures are done, no more bleeding, she can be transitioned to therapeutic oral  anticoagulation in the form of a DOAC.  She is morbidly obese, but is not at a weight that exceeds the upper limit of recommended utilization for DOACs.    -No hypercoagulable workup is warranted as these are catheter-associated thrombotic events.      -If able to tolerate anticoagulation, would recommend a minimum of 12 weeks (3 months) of anticoagulation.        Critical care time spent 35 minutes today     Nadir Torres MD, [facility], [facility]  Vascular Medicine                    Interval History:   She is intubated, sedated on vent  Requiring blood transfusion due to bleeding and hemoglobin drop  Heparin was stopped  Currently on low-dose pressor  CT tube in place  Getting IV antibiotics  Elevated procalcitonin  Creatinine 1.45               Review of Systems:   Unable to get ROS as she is intubated and sedated.              Medications:       acetylcysteine  1 mL Nebulization Q6H     albuterol  2.5 mg Nebulization Q6H     amiodarone  400 mg Oral or Feeding Tube Daily     aspirin  81 mg Oral or Feeding Tube Daily     chlorhexidine  15 mL Mouth/Throat Q12H     [Held by provider] DULoxetine  60 mg Oral BID     hydrocortisone sodium succinate PF  50 mg Intravenous Q6H     influenza recomb quadrivalent PF  0.5 mL Intramuscular Prior to discharge     insulin aspart  1-12 Units Subcutaneous Q4H     lactobacillus rhamnosus (GG)  1 capsule Oral BID     [Held by provider] metoprolol tartrate  25 mg Oral or Feeding Tube BID     miconazole   Topical BID     multivitamins w/minerals  15 mL Per Feeding Tube Daily     nystatin   Topical BID     pantoprazole  40 mg Per Feeding Tube QAM AC    Or     pantoprazole (PROTONIX) IV  40 mg Intravenous QAM AC     piperacillin-tazobactam  4.5 g Intravenous Q6H     [Held by provider] polyethylene glycol  17 g Oral or Feeding Tube Daily     potassium chloride  20 mEq Oral or Feeding Tube Once     protein modular  2 packet Per Feeding Tube TID     [Held by provider] sennosides  5 mL  Oral or Feeding Tube BID     sodium chloride  3 mL Nebulization Q6H     sodium chloride (PF)  3 mL Intracatheter Q8H     vancomycin  1,000 mg Intravenous Q24H                  Physical Exam:     Vitals were reviewed  Patient Vitals for the past 24 hrs:   Temp Temp src Pulse Resp SpO2 Weight   01/13/22 0800 -- -- 82 15 98 % --   01/13/22 0752 98.5  F (36.9  C) Oral 79 15 97 % --   01/13/22 0700 -- -- 75 18 96 % --   01/13/22 0630 -- -- 78 19 -- --   01/13/22 0600 -- -- 76 17 96 % --   01/13/22 0530 -- -- 79 16 98 % 130.6 kg (287 lb 14.7 oz)   01/13/22 0500 -- -- 77 24 97 % --   01/13/22 0430 -- -- 73 23 97 % --   01/13/22 0400 97.9  F (36.6  C) Oral 75 24 97 % --   01/13/22 0330 -- -- 76 14 -- --   01/13/22 0300 -- -- 80 (!) 32 -- --   01/13/22 0230 -- -- 77 23 94 % --   01/13/22 0200 -- -- 78 22 95 % --   01/13/22 0130 -- -- 80 21 96 % --   01/13/22 0100 -- -- 80 25 96 % --   01/13/22 0030 97.8  F (36.6  C) Oral 81 24 98 % --   01/13/22 0015 -- -- 85 19 97 % --   01/13/22 0000 97.8  F (36.6  C) Oral 86 20 98 % --   01/12/22 2345 -- -- 87 23 98 % --   01/12/22 2330 -- -- 87 19 97 % --   01/12/22 2300 -- -- 89 23 96 % --   01/12/22 2230 -- -- 88 25 97 % --   01/12/22 2224 98.2  F (36.8  C) Oral 88 25 97 % --   01/12/22 2200 -- -- 92 18 96 % --   01/12/22 2145 -- -- 89 18 98 % --   01/12/22 2130 -- -- 89 28 96 % --   01/12/22 2115 -- -- 89 24 96 % --   01/12/22 2100 -- -- 89 23 96 % --   01/12/22 2000 98.6  F (37  C) Oral 85 22 100 % --   01/12/22 1800 -- -- 90 26 99 % --   01/12/22 1700 -- -- 90 17 98 % --   01/12/22 1600 -- -- 92 18 96 % --   01/12/22 1500 -- -- 96 18 95 % --   01/12/22 1400 -- -- 96 18 97 % --   01/12/22 1345 98.5  F (36.9  C) Oral 96 18 97 % --   01/12/22 1330 -- -- 92 19 95 % --   01/12/22 1315 -- -- 93 18 95 % --   01/12/22 1300 -- -- 96 19 95 % --   01/12/22 1245 -- -- 96 24 95 % --   01/12/22 1230 -- -- 93 24 94 % --   01/12/22 1200 98.5  F (36.9  C) Oral 94 18 94 % --   01/12/22 1116 -- -- 93  23 100 % --   01/12/22 1100 -- -- 95 23 97 % --   01/12/22 1000 -- -- 94 24 97 % --   01/12/22 0900 -- -- 95 23 98 % --     Wt Readings from Last 4 Encounters:   01/13/22 130.6 kg (287 lb 14.7 oz)   01/29/20 114.3 kg (252 lb)   07/29/19 114.3 kg (252 lb)   07/17/19 114.3 kg (252 lb)       Intake/Output Summary (Last 24 hours) at 1/6/2022 1033  Last data filed at 1/6/2022 0600  Gross per 24 hour   Intake 2860 ml   Output 2950 ml   Net -90 ml     Constitutional: intubated/sedated.   Eyes: normal  ENT: normal  Neck: Supple, symmetrical. Right internal jugular central line in place.   Back: normal  Lungs: decreased BS anteriorly. Chest tube in place.   Cardiovascular: Regular rate and rhythm, normal S1 and S2, no S3 or S4, and no murmur noted.  Abdomen: obese.   Musculoskeletal: RUE w/ significant enlargement compared to left; Not tense or taut  Neurologic: Sedated  Neuropsychiatric: Sedated.   Skin: normal           Data:     Results for orders placed or performed during the hospital encounter of 12/23/21 (from the past 24 hour(s))   Glucose by meter   Result Value Ref Range    GLUCOSE BY METER POCT 160 (H) 70 - 99 mg/dL   Hemoglobin   Result Value Ref Range    Hemoglobin 6.7 (LL) 11.7 - 15.7 g/dL   Glucose by meter   Result Value Ref Range    GLUCOSE BY METER POCT 169 (H) 70 - 99 mg/dL   Prepare red blood cells (unit)   Result Value Ref Range    CROSSMATCH Compatible     UNIT ABO/RH O Neg     Unit Number [id number]     Unit Status Transfused     Blood Component Type Red Blood Cells     Product Code M3284D19     CODING SYSTEM [id number]     UNIT TYPE ISBT 9500     ISSUE DATE AND TIME [id number]    Glucose by meter   Result Value Ref Range    GLUCOSE BY METER POCT 173 (H) 70 - 99 mg/dL   Hemoglobin   Result Value Ref Range    Hemoglobin 7.1 (L) 11.7 - 15.7 g/dL   Glucose by meter   Result Value Ref Range    GLUCOSE BY METER POCT 141 (H) 70 - 99 mg/dL   Prepare red blood cells (unit)   Result Value Ref Range     CROSSMATCH Compatible     UNIT ABO/RH O Neg     Unit Number [id number]     Unit Status Issued     Blood Component Type Red Blood Cells     Product Code U8858H62     CODING SYSTEM ONAZ824     UNIT TYPE ISBT 9500     ISSUE DATE AND TIME [id number]    Glucose by meter   Result Value Ref Range    GLUCOSE BY METER POCT 154 (H) 70 - 99 mg/dL   CBC with platelets   Result Value Ref Range    WBC Count 19.0 (H) 4.0 - 11.0 10e3/uL    RBC Count 2.95 (L) 3.80 - 5.20 10e6/uL    Hemoglobin 7.6 (L) 11.7 - 15.7 g/dL    Hematocrit 25.6 (L) 35.0 - 47.0 %    MCV 87 78 - 100 fL    MCH 25.8 (L) 26.5 - 33.0 pg    MCHC 29.7 (L) 31.5 - 36.5 g/dL    RDW 21.6 (H) 10.0 - 15.0 %    Platelet Count 255 150 - 450 10e3/uL   Glucose by meter   Result Value Ref Range    GLUCOSE BY METER POCT 157 (H) 70 - 99 mg/dL   Basic metabolic panel   Result Value Ref Range    Sodium 146 (H) 133 - 144 mmol/L    Potassium 3.0 (L) 3.4 - 5.3 mmol/L    Chloride 109 94 - 109 mmol/L    Carbon Dioxide (CO2) 33 (H) 20 - 32 mmol/L    Anion Gap 4 3 - 14 mmol/L    Urea Nitrogen 66 (H) 7 - 30 mg/dL    Creatinine 1.21 (H) 0.52 - 1.04 mg/dL    Calcium 7.9 (L) 8.5 - 10.1 mg/dL    Glucose 175 (H) 70 - 99 mg/dL    GFR Estimate 52 (L) >60 mL/min/1.73m2   Magnesium   Result Value Ref Range    Magnesium 2.6 (H) 1.6 - 2.3 mg/dL   Phosphorus   Result Value Ref Range    Phosphorus 4.9 (H) 2.5 - 4.5 mg/dL   XR Chest Port 1 View    Narrative    EXAM: XR CHEST PORT 1 VIEW  LOCATION: [facility]  DATE/TIME: 1/13/2022 5:35 AM    INDICATION: left hemothorax  COMPARISON: 01/12/2022      Impression    IMPRESSION: Endotracheal tube terminates at 4.3 cm from the ron. Enteric tube terminates below diaphragm and field of view. Right-sided central venous catheter with tip over SVC. Left sided chest tube in situ.    Unchanged moderate to large left pleural effusion. No pneumothorax. Stable cardiomediastinal silhouette.   Glucose by meter   Result Value Ref Range     GLUCOSE BY METER POCT 155 (H) 70 - 99 mg/dL   Blood gas arterial   Result Value Ref Range    pH Arterial 7.34 (L) 7.35 - 7.45    pCO2 Arterial 62 (H) 35 - 45 mm Hg    pO2 Arterial 91 80 - 105 mm Hg    FIO2 60     Bicarbonate Arterial 34 (H) 21 - 28 mmol/L    Base Excess/Deficit (+/-) 6.8 (H) -9.0 - 1.8 mmol/L     *Note: Due to a large number of results and/or encounters for the requested time period, some results have not been displayed. A complete set of results can be found in Results Review.

## 2022-01-13 NOTE — PROGRESS NOTES
[facility] ICU PROGRESS NOTE  01/13/2022        Date of Service (when I saw the patient): 01/13/2022    ASSESSMENT:  Amanda Richardson is a 56 year old female with history of MS with baclofen pump in place, neurogenic bladder, recurrent UTIs, chronic pain syndrome, DM II, hypertension, hyperlipidemia, CKD, Non-hodgkin's lymphoma, Obesity who was admitted on 12/23/21 after presenting to ED with altered mental status. Patient was intubated, treated for septic shock due to UTI, blood Cx (+) E fecalis. She was extubated 12/30/21, transferred to floor 12/31/21.  S/p  left therapeutic thoracentesis (500 ml) on 1/5/22 for hypoxia . RRT 1/8/22 for severe hypoxia prompting intubation and ICU transfer. Septic shock, ARDS and hypoxic respiratory failure.     CHANGES and MAJOR THINGS TODAY:   Decrease PEEP to 12 this am, additional decrease later possibly pending ability to decrease FiO2.   Increase VT to 420  Increase free water flushes   Soulcortef taper ( taper placed)   Replacement of electrolytes   Wean sedation to target TONIA goal   Vascular access unable to find suitable veins for placemen of PIVs', will need to keep internal jugular for now and treat through this clot unfortunately .   Will hold heparin gtt today given ongoing blood requirements.  If stable, will resume gtt 1/14/22 at low intensity schedule       PLAN:    Neuro/ pain/ sedation:  # MS, s/p baclofen pump  # Acute metabolic encephalopathy  # Depression   # Sedation and anagelsia for ventilator compliance  - Pain: Fentanyl infusion, oxycodone PRN, Fentanyl IV PRN.                - wean as able.    - TONIA -1 to -2.   - Sedation: Propofol  gtt               - Off Versed gtt    - off Versed 1/11/22  - Encephalopathy documented in prior hospitalist notes prior to intubation  - Cymbalta on hold given inability to crush while intubated      Pulmonary care:   # Acute hypoxemic hypercarbic respiratory failure, intubated 1/8  # HCAP  # Left pleural effusion, s/p  thoracentesis 1/5  # Mucous plugging, s/p bronchoscopy 1/8 and 1/9   # Left hemothorax s/p CT 1/9/22   Ventilation Mode: CMV/AC  (Continuous Mandatory Ventilation/ Assist Control)  FiO2 (%): 60 %  Rate Set (breaths/minute): 24 breaths/min  Tidal Volume Set (mL): 400 mL  PEEP (cm H2O): 14 cmH2O  Oxygen Concentration (%): 60 %  Resp: 19     - Several bronchs for mucous plugging. Continue mucolytic therapy with Mucomyst and Albuterol and CPT.  - Chest tube in Left chest - daily CXR  - CT of chest 1/9/22 with complete atelectasis of left lung, multiple loculations               - unclear when hemothorax occured, question if from prior thoracentesis on 1/5/22  - thoracic consulted for left hemothorax 1.26mL/24 hours from chest tube, heparin held given increased bleeding this am.      Cardiovascular:    # Septic shock   # Paroxysmal atrial fibrillation  # CHF  # Hyperlipidemia  - Monitor hemodynamic status. Goal MAP >65. Norepi/Vasopressin (off this am around 6 am)          - Continue PO Amiodarone. Lytes replaced.  K>4.0,Mag >2.5  - Continue 81 mg Aspirin  - 1/1 ECHO Normal LV function and seize. EF 60-65%. RV mildly dilated.   - 1/9: normal EF 65-70. left ventricle normal. Left ventricular systolic function is normal. No regional wall motion abnormalities noted. right ventricle is normal in size and function.  - solucortef started 1/9/22--> will wean today as pt on > 5 days of steriod  (taper ordered)      GI:   # Severe protein calorie malnutrition  # Cholelithiasis   # Diarrhea, s/p rectal tube   - PPI  - Continue TF- RD following  - Cholelithiasis present without cholecystitis in CT A/P 1/3. Repeat hepatic panel in the setting of fever, increasing pressor requirements and leukocytosis.   - CTABP 1/9/22:   left pleural effusion with areas of high-density suggesting hemorrhagic contents. Complete atelectasis of the left lung. Small right pleural effusion associated with groundglass opacities and interlobular septal  thickening in the right lung could be related to infection or pulmonary edema. Cholelithiasis and Nonobstructing nephrolithiasis.  - LFT's normal and bilirubin normal.     Renal/ Fluid Balance:    # JULIANNE, improving   # hypervolemia  # hypokalemia  # Hypernatremia, Na 146    - Creatinine baseline 0.64, peaked at 2.03, now down-trending 1.21 (baseline 0.8-1.03 per epic)    - 1/9/22:  FeNA ~ 0.1%  - 1/3 CT A/P with non obstructing renal stones.   - 1/9/21:  US renal: No hydronephrosis, non-obstructing stones.                - seen by urology 1/5, no acute intervention as stone non-obstructing and no evidence of hydronephrosis of US, follow up as OP for definite stone removal with Dr Sapp.   - renal following, appreciate cares and recommendations for this patient   - K 3.0, replacement protocol   - Hypernatremia. Na 146 this am.  Free water deficit 2.2L, will give half today. Increase free water to 50mL every 1 hour       Endocrine:    # DM II  # Relative adrenal insufficiency   - holding  PTA oral agents   - Sliding scale for diabetes management. Goal to keep BG <180.   - High intensity insulin   - steroid wean entered      ID/ Antibiotics:  # HCAP  # Enterococcus faecalis bacteremia 2/2 urinary source- treated - Completed E fecalis bacteremia treatment 1/7  - Antibiotics: Vancomcyn and Zosyn.    - follow culture results:  - Appreciate ID following, discussed with ID. No indication for anti-fungals at this time.     Culture:   - 1/8:  MRSA swab positive 1/8   - COVID-19 PCR negative.   - 1/9: BC x 2, UA with reflex  - 1/9: Cultures pending: BAL.   - 1/5: pleural fluid culture negative.      Heme:   # RIJ DVT (1/5)   # LIJ DVT  # superficial thrombus in cephalic vein from mid to proximal forearm  1/5  # Anemia of chronic illness  # Anemia due to blood loss   - Vascular surgery consulted 1/5/22. Plan for treatment of heparin gtt, plan for 12 weeks total for DVT's.  **unable to remove right internal jugular ,no suitable  veins for placement of PIV's (see  VAD RN's note, although ideal to remove line and treat, will need to keep central line and treat through this given clots in RUE and left internal jugular clots.   - total of 6 units this admission, will monitor and trend hgb.  Will continue to hold heparin gtt today, if hgb stable and not additional blood requirements--> restart heparin at low intensity 1/14/22     Lines/ tubes/ drains:  - RIJ TLC, L axillary arterial line, ETT, TONI ramos.      Disposition:  - [facility] ICU     Time spent on this Encounter   Billing:  I spent 55 minutes bedside and on the inpatient unit today managing the critical care of Amanda Richardson in relation to the issues listed in this note.      Ruiz Gutierrez  ====================================  INTERVAL HISTORY:  Course reviewed. Total of  2 units pRBC in past 24 hours. Chest tube output decreased with cessation of heparin gtt. Sedation vacation this am, pt awake and arouses.     OBJECTIVE:   1. VITAL SIGNS:   Temp:  [97.8  F (36.6  C)-98.6  F (37  C)] 98.5  F (36.9  C)  Pulse:  [73-96] 92  Resp:  [12-32] 19  MAP:  [62 mmHg-90 mmHg] 82 mmHg  Arterial Line BP: (103-145)/(44-58) 133/55  FiO2 (%):  [60 %] 60 %  SpO2:  [94 %-100 %] 97 %  Ventilation Mode: CMV/AC  (Continuous Mandatory Ventilation/ Assist Control)  FiO2 (%): 60 %  Rate Set (breaths/minute): 24 breaths/min  Tidal Volume Set (mL): 400 mL  PEEP (cm H2O): 14 cmH2O  Oxygen Concentration (%): 60 %  Resp: 19    2. INTAKE/ OUTPUT:   I/O last 3 completed shifts:  In: 2349.62 [I.V.:894.62; NG/GT:360]  Out: 4045 [Urine:2015; Emesis/NG output:700; Stool:700; Chest Tube:630]    3. PHYSICAL EXAMINATION:  General:  Sedated,   HEENT: pupils 3mm and reactive. ETT present and secured   Neuro:  TURCIOS. arouses to voice when sedation lightened   Pulm/Resp: BBS, coarse bilateral breath sounds, decreased breath sounds in left lower lung, CT present with bloody serosanguinous drainage.   CV: RRR, S1/S2  Abdomen:  abdomen soft and compressible, baclofen pump noted on palpitation   : (+) ramos, urine yellow   Incisions/Skin: healing ecchymosis present, chronic skin changes in BLE, groin folds reddened   MSK/Extremities: chronic lymphedema in BLE. R >L.  Some dependent edema thighs/buttocks.     4. INVESTIGATIONS:   Arterial Blood Gases   Recent Labs   Lab 01/13/22  0801 01/12/22  0338 01/11/22  0528 01/10/22  0615   PH 7.34* 7.33* 7.37 7.40   PCO2 62* 60* 53* 54*   PO2 91 139* 59* 114*   HCO3 34* 31* 31* 33*     Complete Blood Count   Recent Labs   Lab 01/13/22  0211 01/12/22  1802 01/12/22  1215 01/12/22  0336 01/11/22  1441 01/11/22  0527 01/10/22  1728 01/10/22  0615   WBC 19.0*  --   --  23.8*  --  17.1*  --  21.0*   HGB 7.6* 7.1* 6.7* 7.2*   < > 8.1*   < > 6.9*     --   --  295  --  230  --  289    < > = values in this interval not displayed.     Basic Metabolic Panel  Recent Labs   Lab 01/13/22  0759 01/13/22  0525 01/13/22  0348 01/12/22  2334 01/12/22  0346 01/12/22  0336 01/11/22  0905 01/11/22  0527 01/10/22  0900 01/10/22  0615   NA  --  146*  --   --   --  142  --  141  --  140   POTASSIUM  --  3.0*  --   --   --  3.5  --  4.2  --  4.6   CHLORIDE  --  109  --   --   --  104  --  104  --  102   CO2  --  33*  --   --   --  30  --  30  --  31   BUN  --  66*  --   --   --  66*  --  59*  --  48*   CR  --  1.21*  --   --   --  1.45*  --  1.84*  --  2.03*   * 175* 157* 154*   < > 193*   < > 176*   < > 177*    < > = values in this interval not displayed.     Liver Function Tests  Recent Labs   Lab 01/09/22  1612 01/09/22  0603 01/08/22  0147 01/07/22  0628   AST  --  25 20 23   ALT  --  42 60* 70*   ALKPHOS  --  112 123 121   BILITOTAL  --  1.3 0.6 0.5   ALBUMIN  --  1.7* 2.0* 2.0*   INR 1.37*  --   --   --      Pancreatic Enzymes  No lab results found in last 7 days.  Coagulation Profile  Recent Labs   Lab 01/09/22  1612   INR 1.37*       5. RADIOLOGY:   Recent Results (from the past 24 hour(s))   XR Chest  Port 1 View    Narrative    EXAM: XR CHEST PORT 1 VIEW  LOCATION: [facility]  DATE/TIME: 1/13/2022 5:35 AM    INDICATION: left hemothorax  COMPARISON: 01/12/2022      Impression    IMPRESSION: Endotracheal tube terminates at 4.3 cm from the ron. Enteric tube terminates below diaphragm and field of view. Right-sided central venous catheter with tip over SVC. Left sided chest tube in situ.    Unchanged moderate to large left pleural effusion. No pneumothorax. Stable cardiomediastinal silhouette.       =========================================

## 2022-01-13 NOTE — PROGRESS NOTES
CLINICAL NUTRITION SERVICES - REASSESSMENT NOTE      Malnutrition: % Weight Loss:  None noted  % Intake:  No decreased intake noted  Subcutaneous Fat Loss:  Orbital region moderate depletion  Muscle Loss:  Temporal region mild-moderate depletion  Fluid Retention:  Moderate 3+ per flowsheets     Malnutrition Diagnosis: Moderate malnutrition  In Context of:  Acute illness or injury       EVALUATION OF PROGRESS TOWARD GOALS   Diet:  NPO on vent     Nutrition Support:  Patient's TF was changed to a renal formula and increased slightly on 1/10 and continues as follows ~    Nutrition Support Enteral:  Type of Feeding Tube: Nasoduodenal   Enteral Frequency:  Continuous  Enteral Regimen: Novasource Renal at 20 mL/hr  Total Enteral Provisions: 960 kcal, 44 g protein, 88 g CHO, 0 g fiber, 344 mL H2O   Free Water Flush: 50 mL per hour   ProSource 2 pkt TID= 66 g protein, 240 kcal   Propofol at 10.9 mL/hr= 288 kcal   Total = 1488 kcal (12 kcal/kg), 110 g protein (1.7 g/kg and 98% needs)      Intake/Tolerance:    K 3.0 (L) - Getting replacement  Mg 2.6 (H), Phos 4.9 (H), BUN 66 (H), Cr 1.21 (H) - JULIANNE on CKD   TG level on 1/10= 178 (H)   mL  BGM < 180  I/O 2349/4045, wt 130.6 kg (up from dosing weight)    Stooling:  Stool 700 mL on 1/12, 300 mL on 1/11, and 700 mL on 1/10 - Holding bowel meds, receiving Culturell       ASSESSED NUTRITION NEEDS:  Dosing Weight:  121 kg (energy - 1/7/21 weight);  66 kg (protein - IBW)  Estimated Energy Needs:  3296-4828 kcals (11-14 Kcal/Kg)  Justification: obese  Estimated Protein Needs: 112-132 grams protein (1.7-2 g pro/Kg)  Justification: wound healing, hypercatabolism with acute illness, obesity guidelines  and CKD      NEW FINDINGS:   1/11: WOCN =   - ABD folds, under breast, axilla  skin breakdown due to Moisture Associated Skin Damage (MASD) and suspected fungal vs bacterial rash   - Buttock skin breakdown due to moisture, friction, pressure, chronic illness   - Posterior thighs and  groin and perineal skin breakdown due to MASD     Patient receiving Certavite daily per Pressure Injury Protocol     Patient with 3+ edema per flowsheets    Noted fat and muscle loss     Pressors off as of 1/11    Previous Goals (1/10):   TF + ProSource + Propofol will meet % needs   Evaluation: Met    Previous Nutrition Diagnosis (1/10):   Inadequate enteral nutrition infusion related to TF at 10 mL/hr, Propofol rate has decreased as evidenced by meeting ~3/4 energy needs from current regimen   Evaluation: Resolved       MALNUTRITION  % Weight Loss:  None noted  % Intake:  No decreased intake noted  Subcutaneous Fat Loss:  Orbital region moderate depletion  Muscle Loss:  Temporal region mild-moderate depletion  Fluid Retention:  Moderate 3+ per flowsheets     Malnutrition Diagnosis: Moderate malnutrition  In Context of:  Acute illness or injury    CURRENT NUTRITION DIAGNOSIS  No nutrition diagnosis identified at this time    INTERVENTIONS  Recommendations / Nutrition Prescription  Continue current TF regimen as above     Implementation  Collaboration and Referral of Nutrition care:  Patient discussed today during interdisciplinary bedside rounds     Goals  Novasource Renal + ProSource + Propofol will meet % needs     MONITORING AND EVALUATION:  Progress towards goals will be monitored and evaluated per protocol and Practice Guidelines    Belgica Morris RD, LD, CNSC   Clinical Dietitian - [facility]

## 2022-01-13 NOTE — PROGRESS NOTES
"Brief intensivist note  She received 1 unit rbc's last night for Hb 7.1 and concern for persistent \"oozing;\" hb this AM 7.6.   She is off vasopressors and pO2 this am 139.    rufino strauss  January 13, 2022  "

## 2022-01-13 NOTE — PROGRESS NOTES
[facility]    Infectious Disease Progress Note    Date of Service : 01/13/2022     Assessment:  1. Acute hypoxic respiratory failure related to hemothorax. S/p bronchoscopy. BAL cxs only show candida albicans , nasal MRSA PCR positive. S/p chest tube placement, exudative hemorrhagic fluid, pleural fluid and blood cxs negative thus far  2. 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 TTE without evidence of endocarditis, blood cxs cleared rapidly .  4. Perineal wounds, candida intertrigo, funguria related to cathetrization  5. JULIANNE, transaminitis related to sepsis improved  6. 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 Has bilateral renal calculi , urology has evaluated and recommended stone management at a later date.  7. S.epidermidis bacteremia likely due to culture contamination. Polymicrobial bacteremia seems unlikely. Pump site does not appear infected, there is no overlying erythema and CT abdomen done twice did not show stranding or fluid collection around pump site.   8. Atrial fibrillation, on amiodarone and in sinus rhythm  9. Densities along the pelvic sidewall, unclear whether chronic fluid collection/seroma. or adenopathy. In view of sepsis, wound recommend re evaluation with imaging .  10. Cholelithiasis  11. Chronic medical conditions - DM, HTN, hyperlipidemia, morbidly obese body habitus.     Recommendations  1. Continue vancomycin/Zosyn. Treat for VAP for 1 week  2. Treated for E.fecalis sepsis  3. No other source of infection, afebrile now and leukocytosis improving      Hazel Villagomez MD    Interval History   Remains sedated and intubated    Physical Exam   Temp: 98.4  F (36.9  C) Temp src: Oral   Pulse: 98   Resp: 22 SpO2: 93 % O2 Device: Mechanical Ventilator    Vitals:    01/11/22 0400 01/12/22 0430 01/13/22 0530   Weight: 131.2 kg (289 lb 3.9 oz) 130.7 kg (288 lb 2.3 oz) 130.6 kg (287 lb 14.7 oz)     Vital Signs with Ranges  Temp:  [97.8  F (36.6  C)-98.6  F (37  C)] 98.4  F (36.9  C)  Pulse:  [] 98  Resp:  [12-59] 22  MAP:  [63 mmHg-90 mmHg] 78 mmHg  Arterial Line BP: (105-145)/(44-58) 132/52  FiO2 (%):  [60 %-70 %] 70 %  SpO2:  [93 %-100 %] 93 %    Constitutional: intubated, sedated  Lungs:chest tube in place left sided  Cardiovascular: S1S2  Skin: chronic stasis changes bilateral lower extremities, perineal erythema, intertrigo, excoriated lesions  MS : BL LE lymphedema  Other:    Medications     baclofen (LIORESAL) intraTHECAL Internal Pump       dextrose       fentaNYL 50 mcg/hr (01/13/22 1600)     heparin Stopped (01/12/22 0325)     propofol (DIPRIVAN) infusion 15 mcg/kg/min (01/13/22 1600)     sodium chloride 20 mL/hr at 01/11/22 0756       acetylcysteine  1 mL Nebulization Q6H     albuterol  2.5 mg Nebulization Q6H     amiodarone  400 mg Oral or Feeding Tube Daily     aspirin  81 mg Oral or Feeding Tube Daily     chlorhexidine  15 mL Mouth/Throat Q12H     [Held by provider] DULoxetine  60 mg Oral BID     hydrocortisone sodium succinate PF  50 mg Intravenous Q8H    Followed by     [START ON 1/14/2022] hydrocortisone sodium succinate PF  50 mg Intravenous Q12H    Followed by     [START ON 1/16/2022] hydrocortisone sodium succinate PF  50 mg Intravenous Daily    Followed by     [START ON 1/16/2022] hydrocortisone sodium succinate PF  25 mg Intravenous Daily     influenza recomb quadrivalent PF  0.5 mL Intramuscular Prior to discharge     insulin aspart  1-12 Units Subcutaneous Q4H     lactobacillus rhamnosus (GG)  1 capsule Oral BID     [Held by provider] metoprolol tartrate  25 mg Oral or Feeding Tube BID     miconazole   Topical BID     multivitamins w/minerals  15 mL Per Feeding Tube Daily      nystatin   Topical BID     pantoprazole  40 mg Per Feeding Tube QAM AC    Or     pantoprazole (PROTONIX) IV  40 mg Intravenous QAM AC     piperacillin-tazobactam  4.5 g Intravenous Q6H     [Held by provider] polyethylene glycol  17 g Oral or Feeding Tube Daily     protein modular  2 packet Per Feeding Tube TID     [Held by provider] sennosides  5 mL Oral or Feeding Tube BID     sodium chloride  3 mL Nebulization Q6H     sodium chloride (PF)  3 mL Intracatheter Q8H     vancomycin  1,000 mg Intravenous Q24H       Data   All microbiology laboratory data reviewed.  Recent Labs   Lab Test 01/13/22  0211 01/12/22  1802 01/12/22  1215 01/12/22  0336 01/11/22  1441 01/11/22  0527   WBC 19.0*  --   --  23.8*  --  17.1*   HGB 7.6* 7.1* 6.7* 7.2*   < > 8.1*   HCT 25.6*  --   --  24.5*  --  27.2*   MCV 87  --   --  82  --  82     --   --  295  --  230    < > = values in this interval not displayed.     Recent Labs   Lab Test 01/13/22  0525 01/12/22  0336 01/11/22  0527   CR 1.21* 1.45* 1.84*     Recent Labs   Lab Test 12/23/21  2121   SED 14

## 2022-01-13 NOTE — PROGRESS NOTES
[facility] ICU RESPIRATORY NOTE        Date of Admission: 12/23/2021    Date of Intubation (most recent): 1/8/22    Reason for Mechanical Ventilation: respiratory failure    Number of Days on Mechanical Ventilation: 6    Met Criteria for Spontaneous Breathing Trial: No    Reason for No Spontaneous Breathing Trial: Peep of 12    Significant Events Today: none    ABG Results:   Recent Labs   Lab 01/13/22  0801 01/12/22  0338 01/11/22  0528 01/10/22  0615   PH 7.34* 7.33* 7.37 7.40   PCO2 62* 60* 53* 54*   PO2 91 139* 59* 114*   HCO3 34* 31* 31* 33*   O2PER 60 80 40 70       Current Vent Settings: Ventilation Mode: CMV/AC  (Continuous Mandatory Ventilation/ Assist Control)  FiO2 (%): 70 %  Rate Set (breaths/minute): 24 breaths/min  Tidal Volume Set (mL): 410 mL  PEEP (cm H2O): 12 cmH2O  Oxygen Concentration (%): 70 %  Resp: 22      Skin Assessment: intact    Plan: Continue full vent support and wean as tolerated    Cytnhia Bhatia, RT

## 2022-01-13 NOTE — PROGRESS NOTES
VSS, was able to turn off the levo and patient stayed off of it. Not much out of the chest tube. Pt hgb a little after 1200pm was 6.7, received one unit of blood. Waiting for results of hgb. Veletri turned off too.    no

## 2022-01-13 NOTE — PROGRESS NOTES
[facility] ICU RESPIRATORY NOTE        Date of Admission: 12/23/2021    Date of Intubation (most recent): 1/8/22    Reason for Mechanical Ventilation: Resp failure    Number of Days on Mechanical Ventilation: 6    Met Criteria for Spontaneous Breathing Trial: No    Reason for No Spontaneous Breathing Trial:     Significant Events Today: None    ABG Results:   Recent Labs   Lab 01/12/22  0338 01/11/22  0528 01/10/22  0615 01/09/22  1737   PH 7.33* 7.37 7.40 7.35   PCO2 60* 53* 54* 62*   PO2 139* 59* 114* 116*   HCO3 31* 31* 33* 34*   O2PER 80 40 70 70       Current Vent Settings: Ventilation Mode: CMV/AC  (Continuous Mandatory Ventilation/ Assist Control)  FiO2 (%): 60 %  Rate Set (breaths/minute): 24 breaths/min  Tidal Volume Set (mL): 400 mL  PEEP (cm H2O): 14 cmH2O  Oxygen Concentration (%): 60 %  Resp: 24          Epifanio Hogan, RT

## 2022-01-13 NOTE — PROGRESS NOTES
PICC/IV team was consulted for Midline and two PIVs placement. RN spoke to the PA and asked if it was okay to place three PIVs instead of 2 PIV and a Midline. PA was okay with this plan of care because they  wanted to discontinue internal jugular line. Patient's right arm was not a good candidate for PIV because of previous clot in the cephalic vein and the jugular vein. The right arm was also very edematous. During assessment it was noted that pt did not have viable veins on the left arm for three PIVs. Pt only had one small basilic and another tiny vein. The majority of her veins had been infiltrated from previous PIVs. Spoke to PA and the decision was made to keep internal jugular line. PICC/IV team will complete consult for now. Please feel free to consult again if needed.

## 2022-01-14 NOTE — PROGRESS NOTES
[facility] ICU RESPIRATORY NOTE        Date of Admission: 12/23/2021    Date of Intubation (most recent): 1/8/22    Reason for Mechanical Ventilation: resp failure    Number of Days on Mechanical Ventilation: 7    Met Criteria for Spontaneous Breathing Trial: No    Reason for No Spontaneous Breathing Trial:  fio2 70%    Significant Events Today: None    ABG Results:   Recent Labs   Lab 01/14/22  0349 01/13/22  0801 01/12/22  0338 01/11/22  0528   PH 7.35 7.34* 7.33* 7.37   PCO2 63* 62* 60* 53*   PO2 108* 91 139* 59*   HCO3 35* 34* 31* 31*   O2PER 70 60 80 40       Current Vent Settings: Ventilation Mode: CMV/AC  (Continuous Mandatory Ventilation/ Assist Control)  FiO2 (%): 70 %  Rate Set (breaths/minute): 24 breaths/min  Tidal Volume Set (mL): 410 mL  PEEP (cm H2O): 12 cmH2O  Oxygen Concentration (%): 70 %  Resp: 29        Epifanio Hogan, RT

## 2022-01-14 NOTE — PROGRESS NOTES
Assessment and Plan:   JULIANNE: associated with sepsis and shock. Resolving.   I/O 3089/2190 UO + 1800 other.   Labs show Na 146, K 3.7, HCO3 33, Cr 1.0    Date/Time Weight [facility]   01/14/22 0200 129.8 kg (286 lb 2.5 oz)    01/13/22 0530 130.6 kg (287 lb 14.7 oz) DF   01/12/22 0430 130.7 kg (288 lb 2.3 oz) DF   01/11/22 0400 131.2 kg (289 lb 3.9 oz)      Neg fluid balance last 3 days.     Increase free water  Per FT noted. Monitor Na.     We will sign off.             Interval History:   Anemia: Hgb 7.2. She has gotten transfusions.   Resp failure/mech failure:  S/P chests tube L.   Urosepsis: with shock, MSOF. On vanco and zosyn. On levophed.   MS: neurogenic bladder.      DM  HT  Obesity                  Review of Systems:   Intubated.        Medications:       acetylcysteine  1 mL Nebulization Q6H     albuterol  2.5 mg Nebulization Q6H     amiodarone  400 mg Oral or Feeding Tube Daily     aspirin  81 mg Oral or Feeding Tube Daily     chlorhexidine  15 mL Mouth/Throat Q12H     [Held by provider] DULoxetine  60 mg Oral BID     hydrocortisone sodium succinate PF  50 mg Intravenous Q12H    Followed by     [START ON 1/16/2022] hydrocortisone sodium succinate PF  50 mg Intravenous Daily    Followed by     [START ON 1/16/2022] hydrocortisone sodium succinate PF  25 mg Intravenous Daily     influenza recomb quadrivalent PF  0.5 mL Intramuscular Prior to discharge     insulin aspart  1-12 Units Subcutaneous Q4H     lactobacillus rhamnosus (GG)  1 capsule Oral BID     [Held by provider] metoprolol tartrate  25 mg Oral or Feeding Tube BID     miconazole   Topical BID     multivitamins w/minerals  15 mL Per Feeding Tube Daily     nystatin   Topical BID     pantoprazole  40 mg Per Feeding Tube QAM AC    Or     pantoprazole (PROTONIX) IV  40 mg Intravenous QAM AC     piperacillin-tazobactam  4.5 g Intravenous Q6H     [Held by provider] polyethylene glycol  17 g Oral or Feeding Tube Daily     protein modular  2 packet Per  Feeding Tube TID     [Held by provider] sennosides  5 mL Oral or Feeding Tube BID     sodium chloride (PF)  3 mL Intracatheter Q8H       baclofen (LIORESAL) intraTHECAL Internal Pump       dextrose       fentaNYL Stopped (01/14/22 1049)     [Held by provider] heparin 0 Units/hr (01/13/22 2000)     propofol (DIPRIVAN) infusion Stopped (01/14/22 1049)     sodium chloride 20 mL/hr at 01/14/22 0821     Current active medications and PTA medications reviewed, see medication list for details.            Physical Exam:   Vitals were reviewed  Patient Vitals for the past 24 hrs:   Temp Temp src Pulse Resp SpO2 Weight   01/14/22 1130 -- -- 104 24 99 % --   01/14/22 1100 -- -- 103 29 98 % --   01/14/22 1030 -- -- 103 (!) 31 98 % --   01/14/22 1000 -- -- 104 24 97 % --   01/14/22 0930 -- -- 105 24 97 % --   01/14/22 0900 -- -- 104 24 97 % --   01/14/22 0830 -- -- 98 24 96 % --   01/14/22 0800 (!) 100.7  F (38.2  C) Axillary 98 24 97 % --   01/14/22 0730 -- -- 96 28 97 % --   01/14/22 0700 -- -- 94 24 97 % --   01/14/22 0600 -- -- 94 (!) 33 97 % --   01/14/22 0500 -- -- 93 30 97 % --   01/14/22 0400 -- -- 95 29 97 % --   01/14/22 0300 -- -- 96 29 97 % --   01/14/22 0200 -- -- 95 22 96 % 129.8 kg (286 lb 2.5 oz)   01/14/22 0145 -- -- 93 23 96 % --   01/14/22 0130 -- -- 98 24 96 % --   01/14/22 0115 -- -- 98 25 96 % --   01/14/22 0100 -- -- 96 25 96 % --   01/14/22 0045 -- -- 95 23 96 % --   01/14/22 0030 -- -- 89 30 96 % --   01/14/22 0015 -- -- 94 25 96 % --   01/14/22 0000 -- -- 91 23 94 % --   01/13/22 2345 -- -- 93 18 96 % --   01/13/22 2330 -- -- 90 23 96 % --   01/13/22 2315 -- -- 90 22 96 % --   01/13/22 2300 -- -- 89 26 96 % --   01/13/22 2245 -- -- 88 20 96 % --   01/13/22 2230 -- -- 82 18 95 % --   01/13/22 2215 -- -- 84 27 96 % --   01/13/22 2200 -- -- 88 24 96 % --   01/13/22 2145 -- -- 92 23 96 % --   01/13/22 2130 -- -- 93 30 96 % --   01/13/22 2115 -- -- 92 28 94 % --   01/13/22 2100 -- -- 89 24 96 % --   01/13/22   -- -- 93 25 97 % --   22 -- -- 90 23 97 % --   22 -- -- 88 23 96 % --   22 98.8  F (37.1  C) Axillary 89 24 97 % --   22 -- -- 89 23 97 % --   22 193 -- -- 90 23 96 % --   22 191 -- -- 91 24 97 % --   22 1900 -- -- 90 24 97 % --   22 1600 98.4  F (36.9  C) Oral 98 22 93 % --   22 1500 -- -- 102 (!) 40 93 % --   22 1400 -- -- 98 (!) 59 97 % --   22 1300 -- -- 96 29 95 % --   22 1240 -- -- -- -- 93 % --   22 1200 98.2  F (36.8  C) Oral 91 19 97 % --       Temp:  [98.2  F (36.8  C)-100.7  F (38.2  C)] 100.7  F (38.2  C)  Pulse:  [] 104  Resp:  [18-59] 24  MAP:  [68 mmHg-190 mmHg] 96 mmHg  Arterial Line BP: (115-190)/() 161/67  FiO2 (%):  [70 %] 70 %  SpO2:  [93 %-99 %] 99 %    Temperatures:  Current - Temp: (!) 100.7  F (38.2  C); Max - Temp  Av  F (37.2  C)  Min: 98.2  F (36.8  C)  Max: 100.7  F (38.2  C)  Respiration range: Resp  Av.8  Min: 18  Max: 59  Pulse range: Pulse  Av  Min: 82  Max: 105  Blood pressure range: No data recorded.  ; No data recorded.    Pulse oximetry range: SpO2  Av.2 %  Min: 93 %  Max: 99 %    I/O last 3 completed shifts:  In: 3179.96 [I.V.:1099.96; NG/GT:1600]  Out: 3555 [Urine:2415; Emesis/NG output:120; Stool:800; Chest Tube:220]      Intake/Output Summary (Last 24 hours) at 2022 1140  Last data filed at 2022 1100  Gross per 24 hour   Intake 3389.71 ml   Output 4580 ml   Net -1190.29 ml     Intubated  Supine  ND TF running         Wt Readings from Last 4 Encounters:   22 129.8 kg (286 lb 2.5 oz)   20 114.3 kg (252 lb)   19 114.3 kg (252 lb)   19 114.3 kg (252 lb)          Data:          Lab Results   Component Value Date     2022     2022     2022     2019     2019     2019    Lab Results   Component Value Date    CHLORIDE 111 2022    CHLORIDE  109 01/13/2022    CHLORIDE 104 01/12/2022    CHLORIDE 103 08/03/2019    CHLORIDE 103 06/21/2019    CHLORIDE 102 04/16/2019    Lab Results   Component Value Date    BUN 62 01/14/2022    BUN 66 01/13/2022    BUN 66 01/12/2022    BUN 20 08/03/2019    BUN 17 06/21/2019    BUN 14 04/16/2019      Lab Results   Component Value Date    POTASSIUM 3.7 01/14/2022    POTASSIUM 3.6 01/13/2022    POTASSIUM 3.3 01/13/2022    POTASSIUM 4.0 08/03/2019    POTASSIUM 3.8 06/21/2019    POTASSIUM 3.9 04/16/2019    Lab Results   Component Value Date    CO2 33 01/14/2022    CO2 33 01/13/2022    CO2 30 01/12/2022    CO2 29 08/03/2019    CO2 30 06/21/2019    CO2 32 04/16/2019    Lab Results   Component Value Date    CR 1.00 01/14/2022    CR 1.21 01/13/2022    CR 1.45 01/12/2022    CR 0.47 08/03/2019    CR 0.50 06/21/2019    CR 0.58 04/16/2019        Recent Labs   Lab Test 01/14/22  0345 01/13/22  1748 01/13/22  0211 01/12/22  1215 01/12/22  0336   WBC 18.1*  --  19.0*  --  23.8*   HGB 7.1* 7.2* 7.6*   < > 7.2*   HCT 24.4*  --  25.6*  --  24.5*   MCV 88  --  87  --  82     --  255  --  295    < > = values in this interval not displayed.     Recent Labs   Lab Test 01/09/22  0603 01/08/22  0147 01/07/22  0628 12/24/21  0403 12/23/21  2121   AST 25 20 23   < > 3,722*   ALT 42 60* 70*   < > >1,000*   ALKPHOS 112 123 121   < > 246*   BILITOTAL 1.3 0.6 0.5   < > 1.5*   ABY  --   --   --   --  49    < > = values in this interval not displayed.       Recent Labs   Lab Test 01/14/22  0345 01/13/22  0525 01/12/22  0336   MAG 2.6* 2.6* 2.4*     Recent Labs   Lab Test 01/14/22 0345 01/13/22  0525 01/12/22  0336   PHOS 3.7 4.9* 6.9*     Recent Labs   Lab Test 01/14/22 0345 01/13/22  0525 01/12/22  0336   MARY 7.9* 7.9* 7.7*       Lab Results   Component Value Date    MARY 7.9 (L) 01/14/2022     Lab Results   Component Value Date    WBC 18.1 (H) 01/14/2022    HGB 7.1 (L) 01/14/2022    HCT 24.4 (L) 01/14/2022    MCV 88 01/14/2022      01/14/2022     Lab Results   Component Value Date     (H) 01/14/2022    POTASSIUM 3.7 01/14/2022    CHLORIDE 111 (H) 01/14/2022    CO2 33 (H) 01/14/2022     (H) 01/14/2022     Lab Results   Component Value Date    BUN 62 (H) 01/14/2022    CR 1.00 01/14/2022     Lab Results   Component Value Date    MAG 2.6 (H) 01/14/2022     Lab Results   Component Value Date    PHOS 3.7 01/14/2022       Creatinine   Date Value Ref Range Status   01/14/2022 1.00 0.52 - 1.04 mg/dL Final   01/13/2022 1.21 (H) 0.52 - 1.04 mg/dL Final   01/12/2022 1.45 (H) 0.52 - 1.04 mg/dL Final   01/11/2022 1.84 (H) 0.52 - 1.04 mg/dL Final   01/10/2022 2.03 (H) 0.52 - 1.04 mg/dL Final   01/10/2022 2.08 (H) 0.52 - 1.04 mg/dL Final   08/03/2019 0.47 (L) 0.52 - 1.04 mg/dL Final   06/21/2019 0.50 (L) 0.52 - 1.04 mg/dL Final   04/16/2019 0.58 0.52 - 1.04 mg/dL Final   04/15/2019 0.57 0.52 - 1.04 mg/dL Final   04/14/2019 0.55 0.52 - 1.04 mg/dL Final   04/13/2019 0.51 (L) 0.52 - 1.04 mg/dL Final       Attestation:  I have reviewed today's vital signs, notes, medications, labs and imaging.     Osmani Mosqueda MD

## 2022-01-14 NOTE — PROGRESS NOTES
[facility]  Vascular Medicine Progress Note            Assessment and Plan:       Amanda Richardson is a 56 year old female with history of MS with baclofen pump in place, neurogenic bladder, recurrent UTIs, chronic pain syndrome, DM II, hypertension, hyperlipidemia, CKD, Non-hodgkin's lymphoma, Obesity who was admitted on 12/23/21 after presenting to ED with altered mental status. Patient was intubated, treated for septic shock due to UTI, blood Cx (+) E fecalis. She was extubated 12/30/21, transferred to floor 12/31/21.   She had progressive hypoxia, underwent left therapeutic thoracentesis (500 ml). RRT 1/8/22 for severe hypoxia prompting intubation and ICU transfer.        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    Acute hypoxemic hypercarbic respiratory failure intubated again on January 8, 2021    Status post bronchoscopy for mucous plugging on January 8 and January 9    HCAP    Left-sided hemothorax status post CT January 9, 2022    Anemia hemoglobin requiring periodic  transfusions        -The patient is symptomatic from the above catheter associated IJ and right upper extremity DVT and SVT.  One treatment approach would simply be to remove the offending catheters and undertake serial imaging.  her HGB dropped requiring periodic transfusions but unfortunatley no PIV options . Left UE swelling and art line in place but soft on palpation   Both hands and arms puffy     Discussed with ICU provider.  If possible remove central line and  Consider PICC line  and restart low intensity IV heparin when able  due to ongoing bleeding requiring transfusions, monitor serial venous Bilateral UE venous  duplex , elevate arm etc     .  The likelihood of upper extremity embolization to the lungs is extremely low. Would not presently recommended obtaining a PE protocol CT of the chest.     -She has pleural effusions. S/P repeat  thoracentesis. Chest tube in place now.     -Eventually, when all procedures are done, no more bleeding, she can be transitioned to therapeutic oral anticoagulation in the form of a DOAC.  She is morbidly obese, but is not at a weight that exceeds the upper limit of recommended utilization for DOACs.    -No hypercoagulable workup is warranted as these are catheter-associated thrombotic events.      -If able to tolerate anticoagulation, would recommend a minimum of 12 weeks (3 months) of anticoagulation.        Critical care time spent 35 minutes today     Vascular Medicine will sign off , please call us with any questions.[phone]    Nadir Torres MD, FAADRIA, [facility]  Vascular Medicine                    Interval History:   She is intubated, sedated on vent  Requiring periodic blood transfusion due to bleeding and hemoglobin drop  Heparin was stopped  CT tube in place  Getting IV antibiotics  Elevated procalcitonin  HGB 7.2              Review of Systems:   Unable to get ROS as she is intubated and sedated.              Medications:       acetylcysteine  1 mL Nebulization Q6H     albuterol  2.5 mg Nebulization Q6H     amiodarone  400 mg Oral or Feeding Tube Daily     aspirin  81 mg Oral or Feeding Tube Daily     chlorhexidine  15 mL Mouth/Throat Q12H     [Held by provider] DULoxetine  60 mg Oral BID     hydrocortisone sodium succinate PF  50 mg Intravenous Q12H    Followed by     [START ON 1/16/2022] hydrocortisone sodium succinate PF  50 mg Intravenous Daily    Followed by     [START ON 1/16/2022] hydrocortisone sodium succinate PF  25 mg Intravenous Daily     influenza recomb quadrivalent PF  0.5 mL Intramuscular Prior to discharge     insulin aspart  1-12 Units Subcutaneous Q4H     lactobacillus rhamnosus (GG)  1 capsule Oral BID     [Held by provider] metoprolol tartrate  25 mg Oral or Feeding Tube BID     miconazole   Topical BID     multivitamins w/minerals  15 mL Per Feeding Tube Daily     nystatin    Topical BID     pantoprazole  40 mg Per Feeding Tube QAM AC    Or     pantoprazole (PROTONIX) IV  40 mg Intravenous QAM AC     piperacillin-tazobactam  4.5 g Intravenous Q6H     [Held by provider] polyethylene glycol  17 g Oral or Feeding Tube Daily     protein modular  2 packet Per Feeding Tube TID     [Held by provider] sennosides  5 mL Oral or Feeding Tube BID     sodium chloride (PF)  3 mL Intracatheter Q8H     vancomycin  1,000 mg Intravenous Q24H                  Physical Exam:     Vitals were reviewed  Patient Vitals for the past 24 hrs:   Temp Temp src Pulse Resp SpO2 Weight   01/14/22 0930 -- -- 105 24 97 % --   01/14/22 0900 -- -- 104 24 97 % --   01/14/22 0830 -- -- 98 24 96 % --   01/14/22 0800 (!) 100.7  F (38.2  C) Axillary 98 24 97 % --   01/14/22 0730 -- -- 96 28 97 % --   01/14/22 0700 -- -- 94 24 97 % --   01/14/22 0600 -- -- 94 (!) 33 97 % --   01/14/22 0500 -- -- 93 30 97 % --   01/14/22 0400 -- -- 95 29 97 % --   01/14/22 0300 -- -- 96 29 97 % --   01/14/22 0200 -- -- 95 22 96 % 129.8 kg (286 lb 2.5 oz)   01/14/22 0145 -- -- 93 23 96 % --   01/14/22 0130 -- -- 98 24 96 % --   01/14/22 0115 -- -- 98 25 96 % --   01/14/22 0100 -- -- 96 25 96 % --   01/14/22 0045 -- -- 95 23 96 % --   01/14/22 0030 -- -- 89 30 96 % --   01/14/22 0015 -- -- 94 25 96 % --   01/14/22 0000 -- -- 91 23 94 % --   01/13/22 2345 -- -- 93 18 96 % --   01/13/22 2330 -- -- 90 23 96 % --   01/13/22 2315 -- -- 90 22 96 % --   01/13/22 2300 -- -- 89 26 96 % --   01/13/22 2245 -- -- 88 20 96 % --   01/13/22 2230 -- -- 82 18 95 % --   01/13/22 2215 -- -- 84 27 96 % --   01/13/22 2200 -- -- 88 24 96 % --   01/13/22 2145 -- -- 92 23 96 % --   01/13/22 2130 -- -- 93 30 96 % --   01/13/22 2115 -- -- 92 28 94 % --   01/13/22 2100 -- -- 89 24 96 % --   01/13/22 2045 -- -- 93 25 97 % --   01/13/22 2030 -- -- 90 23 97 % --   01/13/22 2015 -- -- 88 23 96 % --   01/13/22 2000 98.8  F (37.1  C) Axillary 89 24 97 % --   01/13/22 1945 -- --  89 23 97 % --   01/13/22 1930 -- -- 90 23 96 % --   01/13/22 1915 -- -- 91 24 97 % --   01/13/22 1900 -- -- 90 24 97 % --   01/13/22 1600 98.4  F (36.9  C) Oral 98 22 93 % --   01/13/22 1500 -- -- 102 (!) 40 93 % --   01/13/22 1400 -- -- 98 (!) 59 97 % --   01/13/22 1300 -- -- 96 29 95 % --   01/13/22 1240 -- -- -- -- 93 % --   01/13/22 1200 98.2  F (36.8  C) Oral 91 19 97 % --   01/13/22 1100 -- -- 90 20 99 % --     Wt Readings from Last 4 Encounters:   01/14/22 129.8 kg (286 lb 2.5 oz)   01/29/20 114.3 kg (252 lb)   07/29/19 114.3 kg (252 lb)   07/17/19 114.3 kg (252 lb)       Intake/Output Summary (Last 24 hours) at 1/6/2022 1033  Last data filed at 1/6/2022 0600  Gross per 24 hour   Intake 2860 ml   Output 2950 ml   Net -90 ml     Constitutional: intubated/sedated.   Eyes: normal  ENT: normal  Neck: Supple, symmetrical. Right internal jugular central line in place.   Back: normal  Lungs: decreased BS anteriorly. Chest tube in place.   Cardiovascular: Regular rate and rhythm, normal S1 and S2, no S3 or S4, and no murmur noted.  Abdomen: obese.   Musculoskeletal: RUE w/ significant enlargement compared to left; Not tense or taut  Neurologic: Sedated  Neuropsychiatric: Sedated.   Skin: normal           Data:     Results for orders placed or performed during the hospital encounter of 12/23/21 (from the past 24 hour(s))   Glucose by meter   Result Value Ref Range    GLUCOSE BY METER POCT 153 (H) 70 - 99 mg/dL   Potassium   Result Value Ref Range    Potassium 3.3 (L) 3.4 - 5.3 mmol/L   Glucose by meter   Result Value Ref Range    GLUCOSE BY METER POCT 160 (H) 70 - 99 mg/dL   Hemoglobin   Result Value Ref Range    Hemoglobin 7.2 (L) 11.7 - 15.7 g/dL   Glucose by meter   Result Value Ref Range    GLUCOSE BY METER POCT 150 (H) 70 - 99 mg/dL   Potassium   Result Value Ref Range    Potassium 3.6 3.4 - 5.3 mmol/L   Glucose by meter   Result Value Ref Range    GLUCOSE BY METER POCT 163 (H) 70 - 99 mg/dL   CBC with platelets    Result Value Ref Range    WBC Count 18.1 (H) 4.0 - 11.0 10e3/uL    RBC Count 2.76 (L) 3.80 - 5.20 10e6/uL    Hemoglobin 7.1 (L) 11.7 - 15.7 g/dL    Hematocrit 24.4 (L) 35.0 - 47.0 %    MCV 88 78 - 100 fL    MCH 25.7 (L) 26.5 - 33.0 pg    MCHC 29.1 (L) 31.5 - 36.5 g/dL    RDW 22.8 (H) 10.0 - 15.0 %    Platelet Count 299 150 - 450 10e3/uL   Basic metabolic panel   Result Value Ref Range    Sodium 146 (H) 133 - 144 mmol/L    Potassium 3.7 3.4 - 5.3 mmol/L    Chloride 111 (H) 94 - 109 mmol/L    Carbon Dioxide (CO2) 33 (H) 20 - 32 mmol/L    Anion Gap 2 (L) 3 - 14 mmol/L    Urea Nitrogen 62 (H) 7 - 30 mg/dL    Creatinine 1.00 0.52 - 1.04 mg/dL    Calcium 7.9 (L) 8.5 - 10.1 mg/dL    Glucose 165 (H) 70 - 99 mg/dL    GFR Estimate 65 >60 mL/min/1.73m2   Magnesium   Result Value Ref Range    Magnesium 2.6 (H) 1.6 - 2.3 mg/dL   Phosphorus   Result Value Ref Range    Phosphorus 3.7 2.5 - 4.5 mg/dL   Blood gas arterial with oxyhemoglobin   Result Value Ref Range    pH Arterial 7.35 7.35 - 7.45    pCO2 Arterial 63 (H) 35 - 45 mm Hg    pO2 Arterial 108 (H) 80 - 105 mm Hg    Bicarbonate Arterial 35 (H) 21 - 28 mmol/L    Oxyhemoglobin Arterial 98 92 - 100 %    Base Excess/Deficit (+/-) 8.0 (H) -9.0 - 1.8 mmol/L    FIO2 70    Glucose by meter   Result Value Ref Range    GLUCOSE BY METER POCT 151 (H) 70 - 99 mg/dL   XR Chest Port 1 View    Narrative    EXAM: XR CHEST PORT 1 VIEW  LOCATION: [facility]  DATE/TIME: 1/14/2022 5:47 AM    INDICATION: interval montoring of left hemothorax  COMPARISON: 01/13/2022      Impression    IMPRESSION: Endotracheal tube, enteric tubes, right IJ line and left chest tube. Bilateral infiltrates. Moderate to large left effusion similar.     *Note: Due to a large number of results and/or encounters for the requested time period, some results have not been displayed. A complete set of results can be found in Results Review.

## 2022-01-14 NOTE — PROGRESS NOTES
[facility]    Infectious Disease Progress Note    Date of Service : 01/14/2022     Assessment:  1. Acute hypoxic respiratory failure related to hemothorax. S/p bronchoscopy. BAL cxs only show candida albicans , nasal MRSA PCR positive. S/p chest tube placement, exudative hemorrhagic fluid, pleural fluid and blood cxs negative thus far  2. 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 TTE without evidence of endocarditis, blood cxs cleared rapidly .  4. Perineal wounds, candida intertrigo, funguria related to cathetrization  5. JULIANNE, transaminitis related to sepsis improved  6. 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 Has bilateral renal calculi , urology has evaluated and recommended stone management at a later date.  7. S.epidermidis bacteremia likely due to culture contamination. Polymicrobial bacteremia seems unlikely. Pump site does not appear infected, there is no overlying erythema and CT abdomen done twice did not show stranding or fluid collection around pump site.   8. Atrial fibrillation, on amiodarone and in sinus rhythm  9. Densities along the pelvic sidewall, unclear whether chronic fluid collection/seroma. or adenopathy. In view of sepsis, wound recommend re evaluation with imaging .  10. Cholelithiasis  11. Chronic medical conditions - DM, HTN, hyperlipidemia, morbidly obese body habitus.     Recommendations  1. Treat for VAP with vancomycin/Zosyn for 7-10 days. No apparent empyema with pleural fluid cxs being negative  2. Treated for E.fecalis sepsis  3. No other source of infection, afebrile now and leukocytosis improving  4. Suspect low grade fever and ongoing leukocytosis is related to hemothorax  , chest tube in place    Hazel Villagomez MD    Interval History   Remains sedated and intubated    Physical Exam   Temp: (!) 100.7  F (38.2  C) Temp src: Axillary  Pulse: 105   Resp: 24 SpO2: 97 % O2 Device: Mechanical Ventilator    Vitals:    01/12/22 0430 01/13/22 0530 01/14/22 0200   Weight: 130.7 kg (288 lb 2.3 oz) 130.6 kg (287 lb 14.7 oz) 129.8 kg (286 lb 2.5 oz)     Vital Signs with Ranges  Temp:  [98.2  F (36.8  C)-100.7  F (38.2  C)] 100.7  F (38.2  C)  Pulse:  [] 105  Resp:  [18-59] 24  MAP:  [68 mmHg-190 mmHg] 71 mmHg  Arterial Line BP: (115-190)/() 123/47  FiO2 (%):  [70 %] 70 %  SpO2:  [93 %-99 %] 97 %       Constitutional: intubated, sedated  Lungs:chest tube in place left sided  Cardiovascular: S1S2  Skin: chronic stasis changes bilateral lower extremities, perineal erythema, intertrigo, excoriated lesions  MS : BL LE lymphedema    Other:    Medications     baclofen (LIORESAL) intraTHECAL Internal Pump       dextrose       fentaNYL Stopped (01/14/22 1049)     [Held by provider] heparin 0 Units/hr (01/13/22 2000)     propofol (DIPRIVAN) infusion Stopped (01/14/22 1049)     sodium chloride 20 mL/hr at 01/14/22 0821       acetylcysteine  1 mL Nebulization Q6H     albuterol  2.5 mg Nebulization Q6H     amiodarone  400 mg Oral or Feeding Tube Daily     aspirin  81 mg Oral or Feeding Tube Daily     chlorhexidine  15 mL Mouth/Throat Q12H     [Held by provider] DULoxetine  60 mg Oral BID     hydrocortisone sodium succinate PF  50 mg Intravenous Q12H    Followed by     [START ON 1/16/2022] hydrocortisone sodium succinate PF  50 mg Intravenous Daily    Followed by     [START ON 1/16/2022] hydrocortisone sodium succinate PF  25 mg Intravenous Daily     influenza recomb quadrivalent PF  0.5 mL Intramuscular Prior to discharge     insulin aspart  1-12 Units Subcutaneous Q4H     lactobacillus rhamnosus (GG)  1 capsule Oral BID     [Held by provider] metoprolol tartrate  25 mg Oral or Feeding Tube BID      miconazole   Topical BID     multivitamins w/minerals  15 mL Per Feeding Tube Daily     nystatin   Topical BID     pantoprazole  40 mg Per Feeding Tube QAM AC    Or     pantoprazole (PROTONIX) IV  40 mg Intravenous QAM AC     piperacillin-tazobactam  4.5 g Intravenous Q6H     [Held by provider] polyethylene glycol  17 g Oral or Feeding Tube Daily     protein modular  2 packet Per Feeding Tube TID     [Held by provider] sennosides  5 mL Oral or Feeding Tube BID     sodium chloride (PF)  3 mL Intracatheter Q8H     vancomycin  1,000 mg Intravenous Q24H       Data   All microbiology laboratory data reviewed.  Recent Labs   Lab Test 01/14/22  0345 01/13/22  1748 01/13/22  0211 01/12/22  1215 01/12/22  0336   WBC 18.1*  --  19.0*  --  23.8*   HGB 7.1* 7.2* 7.6*   < > 7.2*   HCT 24.4*  --  25.6*  --  24.5*   MCV 88  --  87  --  82     --  255  --  295    < > = values in this interval not displayed.     Recent Labs   Lab Test 01/14/22  0345 01/13/22  0525 01/12/22  0336   CR 1.00 1.21* 1.45*     Recent Labs   Lab Test 12/23/21  2121   SED 14     Imaging  1/14 CXR   EXAM: XR CHEST PORT 1 VIEW  LOCATION: [facility]  DATE/TIME: 1/14/2022 5:47 AM     INDICATION: interval montoring of left hemothorax  COMPARISON: 01/13/2022                                                                      IMPRESSION: Endotracheal tube, enteric tubes, right IJ line and left chest tube. Bilateral infiltrates. Moderate to large left effusion similar    1/9 CT abdomen/pelvis  EXAM: CT CHEST/ABDOMEN/PELVIS WITH CONTRAST  LOCATION: [facility]  DATE/TIME: 01/09/2022, 3:06 PM     INDICATION: Sepsis.  COMPARISON: 12/23/2021.  TECHNIQUE: CT scan of the chest, abdomen, and pelvis was performed following injection of IV contrast. Multiplanar reformats were obtained. Dose reduction techniques were used.   CONTRAST: 124 mL [id number].      FINDINGS:   LUNGS AND PLEURA: Complete atelectasis of the  left lung. There is a large pleural effusion with evidence of multiple internal loculations. There is high-density within the pleural fluid in the left hemithorax suggesting pockets of hemorrhage. A small   right pleural effusion is present. There is interlobular septal thickening in the right lung and areas of groundglass opacity.     MEDIASTINUM/AXILLAE: Right internal jugular central line terminates in the SVC. An endotracheal tube is appropriately positioned. A feeding tube extends into the distal duodenum. A nasogastric tube terminates in the stomach. The thyroid gland is   unremarkable. No pathologically enlarged thoracic or axillary lymph nodes. Caliber of the thoracic aorta is within normal limits.     CORONARY ARTERY CALCIFICATION: Mild.     HEPATOBILIARY: Gallstones are present in the gallbladder. No worrisome liver lesions.     PANCREAS: Normal.     SPLEEN: Normal.     ADRENAL GLANDS: Normal.     KIDNEYS/BLADDER: Nonobstructing 7 mm calculus in the right renal collecting system. A few tiny calcifications in the left kidney, decreased from prior. No hydronephrosis or worrisome renal mass. Urinary bladder decompressed by a Bobo catheter.     BOWEL: No bowel obstruction or inflammatory change. Normal appendix. No free air or intra-abdominal abscess. No ascites.     LYMPH NODES: Normal.     VASCULATURE: Nonaneurysmal aortic atherosclerosis.     PELVIC ORGANS: Fat-containing lesion in the left pelvis measuring 1.8 cm (series 3, image 262) likely a small dermoid.     MUSCULOSKELETAL: Chronic right femoral neck fracture noted. Lumbosacral fusion hardware noted. No destructive bone lesions.                                                                      IMPRESSION:  1.  There is a large, multiloculated, left pleural effusion with areas of high-density suggesting hemorrhagic contents. The possibility of empyema is not excluded.  2.  Complete atelectasis of the left lung.  3.  Small right pleural effusion  associated with groundglass opacities and interlobular septal thickening in the right lung could be related to infection or pulmonary edema.  4.  Cholelithiasis.  5.  Nonobstructing nephrolithiasis.

## 2022-01-14 NOTE — PROGRESS NOTES
[facility] ICU RESPIRATORY NOTE        Date of Admission: 12/23/2021    Date of Intubation (most recent): 1/8/22    Reason for Mechanical Ventilation: Resp Failure    Number of Days on Mechanical Ventilation: 7    Met Criteria for Spontaneous Breathing Trial: No    Reason for No Spontaneous Breathing Trial: PEEP 12, FiO2 70%    Significant Events Today: none    ABG Results:   Recent Labs   Lab 01/14/22  0349 01/13/22  0801 01/12/22  0338 01/11/22  0528   PH 7.35 7.34* 7.33* 7.37   PCO2 63* 62* 60* 53*   PO2 108* 91 139* 59*   HCO3 35* 34* 31* 31*   O2PER 70 60 80 40       Current Vent Settings: Ventilation Mode: CMV/AC  (Continuous Mandatory Ventilation/ Assist Control)  FiO2 (%): 70 %  Rate Set (breaths/minute): 24 breaths/min  Tidal Volume Set (mL): 410 mL  PEEP (cm H2O): 12 cmH2O  Oxygen Concentration (%): 70 %  Resp: 24      Skin Assessment: clean, dry, intact    Plan: Continue full vent support; wean oxygen as able.    Iza Sampson, RT

## 2022-01-14 NOTE — PROGRESS NOTES
[facility] ICU PROGRESS NOTE  01/14/2022        Date of Service (when I saw the patient): 01/14/2022    ASSESSMENT:  Amanda Richardson is a 56 year old female with history of MS with baclofen pump in place, neurogenic bladder, recurrent UTIs, chronic pain syndrome, DM II, hypertension, hyperlipidemia, CKD, Non-hodgkin's lymphoma, Obesity who was admitted on 12/23/21 after presenting to ED with altered mental status. Patient was intubated, treated for septic shock due to UTI, blood Cx (+) E fecalis. She was extubated 12/30/21, transferred to floor 12/31/21.  S/p  left therapeutic thoracentesis (500 ml) on 1/5/22 for hypoxia . RRT 1/8/22 for severe hypoxia prompting intubation and ICU transfer. Septic shock, ARDS and hypoxic respiratory failure.     CHANGES and MAJOR THINGS TODAY:   - OK to restart Heparin gtt  - Hgb q 8 hr  - FWF to 100 ml q 1 hr  - change 3% nebs to PRN (as mucomyst nebs scheduled as well)  - sedation holiday to assess neuro status      PLAN:    Neuro/ pain/ sedation:  # MS, s/p baclofen pump  # Acute metabolic encephalopathy  # Depression   # Sedation and anagelsia for ventilator compliance  - Pain: Fentanyl infusion, oxycodone PRN, Fentanyl IV PRN.                - wean as able.    - baclofen pump implanted (f/b Dr Griffin, [place], out-patient)   - RASS -1 to -2.   - Sedation: Propofol  gtt - wean as able (sedation holiday today)   - off Versed 1/11/22  - Encephalopathy documented in prior hospitalist notes prior to intubation  - Cymbalta on hold given inability to crush while intubated      Pulmonary care:   # Acute hypoxemic hypercarbic respiratory failure, intubated 1/8  # HCAP  # Left pleural effusion, s/p thoracentesis 1/5  # Mucous plugging, s/p bronchoscopy 1/8 and 1/9   # Left hemothorax s/p CT 1/9/22   Ventilation Mode: CMV/AC  (Continuous Mandatory Ventilation/ Assist Control)  FiO2 (%): 70 %  Rate Set (breaths/minute): 24 breaths/min  Tidal Volume Set (mL): 410 mL  PEEP (cm H2O): 12  cmH2O  Oxygen Concentration (%): 70 %  Resp: (!) 33     - Several bronchs for mucous plugging. Continue mucolytic therapy with Mucomyst and Albuterol and CPT. (can change 3% Nebs to PRN as Mucomyst ordered)  - Chest tube in Left chest - daily CXR  - CT of chest 1/9/22 with complete atelectasis of left lung, multiple loculations               - unclear when hemothorax occured, question if from prior thoracentesis on 1/5/22  - thoracic consulted for left hemothorax 1.26mL/24 hours from chest tube, heparin held given increased bleeding - improved, OK to restart heparin gtt.    - 1/14 CHEST XRAY today noting similar mod-large left effusion (unchanged by my viewing)     Cardiovascular:    # Septic shock   # Paroxysmal atrial fibrillation  # CHF  # Hyperlipidemia  - Monitor hemodynamic status. Goal MAP >65. Norepi/Vasopressin (currently off)   - Continue PO Amiodarone. Lytes replaced.  K>4.0,Mag >2.5  - Continue 81 mg Aspirin  - 1/1 ECHO Normal LV function and seize. EF 60-65%. RV mildly dilated.   - 1/9: normal EF 65-70. left ventricle normal. Left ventricular systolic function is normal. No regional wall motion abnormalities noted. right ventricle is normal in size and function.  - solucortef started 1/9/22--> on taper       GI:   # Severe protein calorie malnutrition  # Cholelithiasis   # Diarrhea, s/p rectal tube   - PPI  - Continue TF at goal (20 ml/hr) - RD following, appreciate recs  - Cholelithiasis present without cholecystitis in CT A/P 1/3. Repeat hepatic panel in the setting of fever, increasing pressor requirements and leukocytosis.   - CTABP 1/9/22:   left pleural effusion with areas of high-density suggesting hemorrhagic contents. Complete atelectasis of the left lung. Small right pleural effusion associated with groundglass opacities and interlobular septal thickening in the right lung could be related to infection or pulmonary edema. Cholelithiasis and Nonobstructing nephrolithiasis.  - LFT's normal and  bilirubin normal.     Renal/ Fluid Balance:    # JULIANNE, improving   # hypervolemia  # hypokalemia  # Hypernatremia, Na 146    - Creatinine baseline 0.64, peaked at 2.03, now down-trending 1.00 (baseline 0.8-1.03 per epic)    - 1/9/22:  FeNA ~ 0.1%  - 1/3 CT A/P with non obstructing renal stones.   - 1/9/21:  US renal: No hydronephrosis, non-obstructing stones.                - seen by urology 1/5, no acute intervention as stone non-obstructing and no evidence of hydronephrosis of US, follow up as OP for definite stone removal with Dr Sapp.   - renal following, appreciate cares and recommendations for this patient   - K 3.7, replacement protocol   - Hypernatremia. Na 146 this am.  Free water deficit 2.8L, Increase free water to 100mL every 1 hour       Endocrine:    # DM II  # Relative adrenal insufficiency   - holding  PTA oral agents   - Sliding scale for diabetes management. Goal to keep BG <180.   - High intensity insulin   - steroid wean entered      ID/ Antibiotics:  # HCAP  # Enterococcus faecalis bacteremia 2/2 urinary source- treated - Completed E fecalis bacteremia treatment 1/7  - Antibiotics: Vancomcyn and Zosyn x 7 days    - follow culture results:  - Appreciate ID following, discussed with ID. No indication for anti-fungals at this time.     Culture:   - 1/8:  MRSA swab positive 1/8   - COVID-19 PCR negative.   - 1/9: BC x 2, UA with reflex  - 1/9: Cultures pending: BAL.   - 1/5: pleural fluid culture negative.      Heme:   # RIJ DVT (1/5)   # LIJ DVT  # superficial thrombus in cephalic vein from mid to proximal forearm  1/5  # Anemia of chronic illness  # Anemia due to blood loss   - Vascular surgery consulted 1/5/22. Plan for treatment of heparin gtt, plan for 12 weeks total for DVT's.  **unable to remove right internal jugular, no suitable veins for placement of PIV's (see  VAD RN's note, although ideal to remove line and treat, will need to keep central line and treat through this given clots in RUE  and left internal jugular clots.   - total of 6 units this admission, will monitor and trend hgb.  Will restart heparin today as no evidence of further bleeding.     MSK:  # MS  # Acute severe weakness and deconditioning d/t critical illness   - Physical Therapy/OT following, appreciate recs  - per family: BL W/C bound but able to self-transfer, dress independently, feeds herself independently      Lines/ tubes/ drains:  - RIJ TLC, L axillary arterial line, ETT, TONI ramos.      Disposition:  - [facility]     Family: Patient's spouse Fernando updated by phone today     Time spent on this Encounter   Billing:  I spent 50 minutes bedside and on the inpatient unit today managing the critical care of Amanda Richardson in relation to the issues listed in this note.      Armida Wright  ====================================  INTERVAL HISTORY:  Course reviewed. No further bleeding overnight noted.     OBJECTIVE:   1. VITAL SIGNS:   Temp:  [98.2  F (36.8  C)-98.8  F (37.1  C)] 98.8  F (37.1  C)  Pulse:  [] 94  Resp:  [12-59] 33  MAP:  [68 mmHg-190 mmHg] 74 mmHg  Arterial Line BP: (115-190)/() 123/51  FiO2 (%):  [60 %-70 %] 70 %  SpO2:  [93 %-99 %] 97 %  Ventilation Mode: CMV/AC  (Continuous Mandatory Ventilation/ Assist Control)  FiO2 (%): 70 %  Rate Set (breaths/minute): 24 breaths/min  Tidal Volume Set (mL): 410 mL  PEEP (cm H2O): 12 cmH2O  Oxygen Concentration (%): 70 %  Resp: (!) 33    2. INTAKE/ OUTPUT:   I/O last 3 completed shifts:  In: 3088.51 [I.V.:1153.51; NG/GT:1120]  Out: 3990 [Urine:2190; Emesis/NG output:620; Stool:1000; Chest Tube:180]    3. PHYSICAL EXAMINATION:  General:  Sedated, opens eyes spontaneously but not following commands or withdrawing.   HEENT: pupils 3mm and reactive. ETT present and secured   Neuro:  TURCIOS. arouses to voice with spontaneous eye opening but damon snot follow commands  Pulm/Resp: BBS, coarse bilateral breath sounds, decreased/absent breath sounds in left lower lung, CT  present with sang/serosanguinous drainage.   CV: RRR, S1/S2  Abdomen: abdomen soft and compressible, baclofen pump noted on palpitation   : (+) ramos, urine yellow   Incisions/Skin: fragile, edematous, healing ecchymosis present, chronic skin changes in BLE, groin folds reddened   MSK/Extremities: chronic lymphedema in BLE. R >L.  Some dependent edema thighs/buttocks, UEs.      4. INVESTIGATIONS:   Arterial Blood Gases   Recent Labs   Lab 01/14/22  0349 01/13/22  0801 01/12/22  0338 01/11/22  0528   PH 7.35 7.34* 7.33* 7.37   PCO2 63* 62* 60* 53*   PO2 108* 91 139* 59*   HCO3 35* 34* 31* 31*     Complete Blood Count   Recent Labs   Lab 01/14/22  0345 01/13/22  1748 01/13/22  0211 01/12/22  1802 01/12/22  1215 01/12/22  0336 01/11/22  1441 01/11/22  0527   WBC 18.1*  --  19.0*  --   --  23.8*  --  17.1*   HGB 7.1* 7.2* 7.6* 7.1*   < > 7.2*   < > 8.1*     --  255  --   --  295  --  230    < > = values in this interval not displayed.     Basic Metabolic Panel  Recent Labs   Lab 01/14/22  0353 01/14/22  0345 01/13/22  2350 01/13/22  2228 01/13/22  2054 01/13/22  1556 01/13/22  1413 01/13/22  0759 01/13/22  0525 01/12/22  0346 01/12/22  0336 01/11/22  0905 01/11/22  0527   NA  --  146*  --   --   --   --   --   --  146*  --  142  --  141   POTASSIUM  --  3.7  --  3.6  --   --  3.3*  --  3.0*  --  3.5  --  4.2   CHLORIDE  --  111*  --   --   --   --   --   --  109  --  104  --  104   CO2  --  33*  --   --   --   --   --   --  33*  --  30  --  30   BUN  --  62*  --   --   --   --   --   --  66*  --  66*  --  59*   CR  --  1.00  --   --   --   --   --   --  1.21*  --  1.45*  --  1.84*   * 165* 163*  --  150*   < >  --    < > 175*   < > 193*   < > 176*    < > = values in this interval not displayed.     Liver Function Tests  Recent Labs   Lab 01/09/22  1612 01/09/22  0603 01/08/22  0147   AST  --  25 20   ALT  --  42 60*   ALKPHOS  --  112 123   BILITOTAL  --  1.3 0.6   ALBUMIN  --  1.7* 2.0*   INR 1.37*  --    --      Pancreatic Enzymes  No lab results found in last 7 days.  Coagulation Profile  Recent Labs   Lab 01/09/22  1612   INR 1.37*       5. RADIOLOGY:   Recent Results (from the past 24 hour(s))   XR Chest Port 1 View    Narrative    EXAM: XR CHEST PORT 1 VIEW  LOCATION: [facility]  DATE/TIME: 1/14/2022 5:47 AM    INDICATION: interval montoring of left hemothorax  COMPARISON: 01/13/2022      Impression    IMPRESSION: Endotracheal tube, enteric tubes, right IJ line and left chest tube. Bilateral infiltrates. Moderate to large left effusion similar.       =========================================

## 2022-01-15 NOTE — PROGRESS NOTES
[facility] ICU PROGRESS NOTE  01/15/2022        Date of Service (when I saw the patient): 01/15/2022    ASSESSMENT:  Amanda Richardson is a 56 year old female with history of MS with baclofen pump in place, neurogenic bladder, recurrent UTIs, chronic pain syndrome, DM II, hypertension, hyperlipidemia, CKD, Non-hodgkin's lymphoma, Obesity who was admitted on 12/23/21 after presenting to ED with altered mental status. Patient was intubated, treated for septic shock due to UTI, blood Cx (+) E fecalis. She was extubated 12/30/21, transferred to floor 12/31/21.  S/p  left therapeutic thoracentesis (500 ml) on 1/5/22 for hypoxia . RRT 1/8/22 for severe hypoxia prompting intubation and ICU transfer. Septic shock, ARDS and hypoxic respiratory failure.     CHANGES and MAJOR THINGS TODAY:   - increase FWF to  150 ml q 1 hr  - BMP recheck this afternoon   - metolazone 5 mg  - Lasix 10 mg  - KCl 40 mEq po x 1   - decrease PEEP to 10    PLAN:    Neuro/ pain/ sedation:  # MS, s/p baclofen pump  # Acute metabolic encephalopathy  # Depression   # Sedation and anagelsia for ventilator compliance  - Pain: oxycodone PRN, Fentanyl IV PRN.    - baclofen pump implanted (f/b Dr Griffin, [place], out-patient)   - RASS goal 0 to -1   - Sedation: Propofol  gtt off    - off Versed 1/11/22  - Encephalopathy documented in prior hospitalist notes prior to intubation  - Cymbalta on hold given inability to crush while intubated      Pulmonary care:   # Acute hypoxemic hypercarbic respiratory failure, intubated 1/8  # HCAP  # Left pleural effusion, s/p thoracentesis 1/5  # Mucous plugging, s/p bronchoscopy 1/8 and 1/9   # Left hemothorax s/p CT 1/9/22   Ventilation Mode: CMV/AC  (Continuous Mandatory Ventilation/ Assist Control)  FiO2 (%): 60 %  Rate Set (breaths/minute): 26 breaths/min  Tidal Volume Set (mL): 410 mL  PEEP (cm H2O): (S) 10 cmH2O (per MDJimena)  Pressure Support (cm H2O): 8 cmH2O  Oxygen Concentration (%): 55 %  Resp: 26     - Several  bronchs for mucous plugging. Continue mucolytic therapy with Mucomyst and Albuterol and CPT. (can change 3% Nebs to PRN as Mucomyst ordered)  - Chest tube in Left chest - daily CXR  - CT of chest 1/9/22 with complete atelectasis of left lung, multiple loculations               - unclear when hemothorax occured, question if from prior thoracentesis on 1/5/22  - thoracic consulted for left hemothorax 1.26mL/24 hours from chest tube, heparin held given increased bleeding - improved, OK to restart heparin gtt.    - 1/15: possible improvement on left side per my read today  - Decrease PEEP to 10    Cardiovascular:    # Septic shock   # Paroxysmal atrial fibrillation  # CHF  # Hyperlipidemia  - Monitor hemodynamic status. Goal MAP >65. Off pressors   - Continue PO Amiodarone. Lytes replaced.  K>4.0,Mag >2.5  - Continue 81 mg Aspirin  - 1/1 ECHO Normal LV function and seize. EF 60-65%. RV mildly dilated.   - 1/9: normal EF 65-70. left ventricle normal. Left ventricular systolic function is normal. No regional wall motion abnormalities noted. right ventricle is normal in size and function.  - solucortef started 1/9/22--> on taper       GI:   # Severe protein calorie malnutrition  # Cholelithiasis   # Diarrhea, s/p rectal tube   - PPI  - Continue TF at goal (20 ml/hr) - RD following, appreciate recs  - Cholelithiasis present without cholecystitis in CT A/P 1/3. Repeat hepatic panel in the setting of fever, increasing pressor requirements and leukocytosis.   - CTABP 1/9/22:   left pleural effusion with areas of high-density suggesting hemorrhagic contents. Complete atelectasis of the left lung. Small right pleural effusion associated with groundglass opacities and interlobular septal thickening in the right lung could be related to infection or pulmonary edema. Cholelithiasis and Nonobstructing nephrolithiasis.  - LFT's normal and bilirubin normal.     Renal/ Fluid Balance:    # JULIANNE, improving   # hypervolemia  #  hypokalemia  # Hypernatremia, likely 2/2 Zosyn   - Creatinine baseline 0.64, peaked at 2.03, now down-trending 0.86 (baseline 0.8-1.03 per epic)    - 1/9/22:  FeNA ~ 0.1%  - 1/3 CT A/P with non obstructing renal stones.   - 1/9/21:  US renal: No hydronephrosis, non-obstructing stones.                - seen by urology 1/5, no acute intervention as stone non-obstructing and no evidence of hydronephrosis of US, follow up as OP for definite stone removal with Dr Sapp.   - renal following, appreciate cares and recommendations for this patient   - K 3.1, replacement protocol, give additional this AM with diuresis   - Hypernatremia. Na 148 this am.  Free water deficit 3.7L, Increase free water to 150mL every 1 hour, and give metolazone/Lasix    - BMP repeat        Endocrine:    # DM II  # Relative adrenal insufficiency   - holding PTA oral agents   - Sliding scale for diabetes management. Goal to keep BG <180.   - High intensity insulin   - steroid wean entered      ID/ Antibiotics:  # HCAP  # Enterococcus faecalis bacteremia 2/2 urinary source- treated - Completed E fecalis bacteremia treatment 1/7  - Antibiotics: Zosyn x 7 days    - follow culture results:  - Appreciate ID following, discussed with ID. No indication for anti-fungals at this time.     Culture:   - 1/8:  MRSA swab positive 1/8   - COVID-19 PCR negative.   - 1/9: BC x 2, UA with reflex  - 1/9: Cultures pending: BAL.   - 1/5: pleural fluid culture negative.      Heme:   # RIJ DVT (1/5)   # LIJ DVT  # superficial thrombus in cephalic vein from mid to proximal forearm  1/5  # Anemia of chronic illness  # Anemia due to blood loss   - Vascular surgery consulted 1/5/22. Plan for treatment of heparin gtt, plan for 12 weeks total for DVT's.  **unable to remove right internal jugular, no suitable veins for placement of PIV's (see  VAD RN's note, although ideal to remove line and treat, will need to keep central line and treat through this given clots in RUE and  left internal jugular clots.   - total of 6 units this admission, will monitor and trend hgb.  Will restart heparin today as no evidence of further bleeding.     MSK:  # MS  # Acute severe weakness and deconditioning d/t critical illness   - Physical Therapy/OT following, appreciate recs  - per family: BL W/C bound but able to self-transfer, dress independently, feeds herself independently      Lines/ tubes/ drains:  - RIJ TLC, L axillary arterial line, ETT, ramos, [place].      Disposition:  - [facility]     Family: Patient's spouse Fernando updated by phone today     Time spent on this Encounter   Billing:  I spent 50 minutes bedside and on the inpatient unit today managing the critical care of Amanda Richardson in relation to the issues listed in this note.      Armida Wright  ====================================  INTERVAL HISTORY:  Course reviewed. Patient remains off sedation, remains drowsy. Patient spontaneously moving BUE/LLE, does not follow commands.     OBJECTIVE:   1. VITAL SIGNS:   Temp:  [98.3  F (36.8  C)-100.3  F (37.9  C)] 99.1  F (37.3  C)  Pulse:  [] 81  Resp:  [23-29] 26  MAP:  [77 mmHg-101 mmHg] 77 mmHg  Arterial Line BP: ()/(53-69) 126/54  FiO2 (%):  [60 %-70 %] 60 %  SpO2:  [90 %-100 %] 99 %  Ventilation Mode: CMV/AC  (Continuous Mandatory Ventilation/ Assist Control)  FiO2 (%): 60 %  Rate Set (breaths/minute): 26 breaths/min  Tidal Volume Set (mL): 410 mL  PEEP (cm H2O): (S) 10 cmH2O (per MDJimena)  Pressure Support (cm H2O): 8 cmH2O  Oxygen Concentration (%): 55 %  Resp: 26    2. INTAKE/ OUTPUT:   I/O last 3 completed shifts:  In: 3985.92 [I.V.:1125.92; NG/GT:2400]  Out: 6420 [Urine:3140; Emesis/NG output:1500; Stool:1250; Chest Tube:530]    3. PHYSICAL EXAMINATION:  General:  opens eyes to voice and spontaneous, follows commands in BUEs,    HEENT/Neuro: pupils 3mm and reactive. ETT present and secured, following commands on UEs    Pulm/Resp: BBS, clear lung sounds upper lobes, coarse  right lower lobe, very dim LLL,  decreased/absent breath sounds in left lower lung, CT present with sang/serosanguinous drainage.   CV: RRR, S1/S2  Abdomen: obese, abdomen soft and compressible, baclofen pump noted on palpitation   : (+) ramos, urine yellow   Incisions/Skin: fragile, edematous, healing ecchymosis present, chronic skin changes in BLE, groin folds reddened   MSK/Extremities: chronic lymphedema in BLE. R >L.  Some dependent edema thighs/buttocks, UEs.      4. INVESTIGATIONS:   Arterial Blood Gases   Recent Labs   Lab 01/14/22  1514 01/14/22  0349 01/13/22  0801 01/12/22  0338   PH 7.35 7.35 7.34* 7.33*   PCO2 64* 63* 62* 60*   PO2 139* 108* 91 139*   HCO3 35* 35* 34* 31*     Complete Blood Count   Recent Labs   Lab 01/15/22  0544 01/15/22  0341 01/14/22  2013 01/14/22  1250 01/14/22  0345 01/13/22  1748 01/13/22  0211   WBC 16.9*  --   --  18.9* 18.1*  --  19.0*   HGB 7.3* 7.4* 7.4* 7.5*  7.5* 7.1*   < > 7.6*     --   --  324 299  --  255    < > = values in this interval not displayed.     Basic Metabolic Panel  Recent Labs   Lab 01/15/22  1034 01/15/22  0907 01/15/22  0544 01/15/22  0337 01/15/22  0003 01/14/22  0353 01/14/22  0345 01/13/22  2350 01/13/22  2228 01/13/22  0759 01/13/22  0525 01/12/22  0346 01/12/22  0336   NA  --   --  148*  --   --   --  146*  --   --   --  146*  --  142   POTASSIUM 3.4  --  3.1*  --   --   --  3.7  --  3.6   < > 3.0*  --  3.5   CHLORIDE  --   --  112*  --   --   --  111*  --   --   --  109  --  104   CO2  --   --  34*  --   --   --  33*  --   --   --  33*  --  30   BUN  --   --  49*  --   --   --  62*  --   --   --  66*  --  66*   CR  --   --  0.86  --   --   --  1.00  --   --   --  1.21*  --  1.45*   GLC  --  158* 164* 152* 168*   < > 165*   < >  --    < > 175*   < > 193*    < > = values in this interval not displayed.     Liver Function Tests  Recent Labs   Lab 01/09/22  1612 01/09/22  0603   AST  --  25   ALT  --  42   ALKPHOS  --  112   BILITOTAL  --   1.3   ALBUMIN  --  1.7*   INR 1.37*  --      Pancreatic Enzymes  No lab results found in last 7 days.  Coagulation Profile  Recent Labs   Lab 01/09/22  1612   INR 1.37*       5. RADIOLOGY:   Recent Results (from the past 24 hour(s))   XR Chest Port 1 View    Narrative    EXAM: XR CHEST PORTABLE 1 VIEW  LOCATION: [facility]  DATE/TIME: 01/15/2022, 8:17 AM    INDICATION: Monitoring left pleural effusion.  COMPARISON: Chest x-ray 01/14/2022.      Impression    IMPRESSION:   1.  ET tube is 4.3 cm proximal to the ron. Right neck central line  tip is stable at the low SVC. Enteric feeding tube is present.  2.  Stable consolidation and atelectasis at the left lung base. Stable  small left pleural fluid. Stable bilateral vascular congestion and  mild edema pattern. Stable trace right pleural fluid. Stable  cardiomegaly.    POPPY SALINAS MD         SYSTEM ID:  [id number]       =========================================

## 2022-01-15 NOTE — PLAN OF CARE
Neuro:  Propofol and fentanyl gtts vacationed off this AM and have remained off all day.  Not waking or following or moving.      CV: Low grade temps.  SR/ST.  Heparin gtt restarted.  CT with moderate output, dark red.    Resp: 70%P12.  Diminished lungs sounds especially LLL.  Minimal secretions.  No wean.    GI:  Rectal tube continues with moderate output.  TF renal.  H2O flushes increased.     : UOP slowly improving.    Skin: No changes.  Nystatin applied to folds and romeo area.  Frequent turns.     at bedside this evening and updated.

## 2022-01-15 NOTE — PROGRESS NOTES
[facility] ICU RESPIRATORY NOTE        Date of Admission: 12/23/2021    Date of Intubation (most recent): 1/8/22    Reason for Mechanical Ventilation: Resp failure    Number of Days on Mechanical Ventilation: 8    Met Criteria for Spontaneous Breathing Trial: Yes     Reason for No Spontaneous Breathing Trial:     Significant Events Today: None    ABG Results:   Recent Labs   Lab 01/14/22  1514 01/14/22  0349 01/13/22  0801 01/12/22  0338   PH 7.35 7.35 7.34* 7.33*   PCO2 64* 63* 62* 60*   PO2 139* 108* 91 139*   HCO3 35* 35* 34* 31*   O2PER 70 70 60 80       Current Vent Settings: Ventilation Mode: CMV/AC  (Continuous Mandatory Ventilation/ Assist Control)  FiO2 (%): 60 %  Rate Set (breaths/minute): 26 breaths/min  Tidal Volume Set (mL): 410 mL  PEEP (cm H2O): 12 cmH2O  Pressure Support (cm H2O): 8 cmH2O  Oxygen Concentration (%): 60 %  Resp: 25        Epifanio Hogan, RT

## 2022-01-15 NOTE — PROGRESS NOTES
[facility] ICU RESPIRATORY NOTE      Date of Admission: 12/23/2021    Date of Intubation (most recent): 1/8/22    Reason for Mechanical Ventilation: Respiratory Failure    Number of Days on Mechanical Ventilation: 8    Met Criteria for Spontaneous Breathing Trial: NO    Reason for No Spontaneous Breathing Trial: PEEP>8    Significant Events Today: none    ABG Results:   Recent Labs   Lab 01/14/22  1514 01/14/22  0349 01/13/22  0801 01/12/22  0338   PH 7.35 7.35 7.34* 7.33*   PCO2 64* 63* 62* 60*   PO2 139* 108* 91 139*   HCO3 35* 35* 34* 31*   O2PER 70 70 60 80       Current Vent Settings: Ventilation Mode: CMV/AC  (Continuous Mandatory Ventilation/ Assist Control)  FiO2 (%): 60 %  Rate Set (breaths/minute): 26 breaths/min  Tidal Volume Set (mL): 410 mL  PEEP (cm H2O): 10 cmH2O  Pressure Support (cm H2O): 8 cmH2O  Oxygen Concentration (%): (S) 50 %  Resp: (!) 32    Skin Assessment: clean and dry     Plan:  Continue to wean from mechanical ventilation and oxygen as tolerated per protocol    Amal Ali, RRT

## 2022-01-15 NOTE — PLAN OF CARE
Neuro: Off sedation, remains drowsy, minimal spontaneous movement of BUE & LLE but unable to follow commands.    Cardiac: NSR/ST, HR 90s-110s, SBP 130s-150s, MAP >64.    Resp: Intubated, Vent: CMV/AC, RR 26, Vt 410, FiO2 60%, PEEP 12, SpO2 >90%.  - CT Output: 140mL     GI: NG: TF @ 20mL/hr, FWF 100mL/hr. OG: LIS, Output: 700mL. Active/Soft/NT, Rectal Tube Output: 800mL    : Bobo, UOP: WDL    BG: Insulin sliding scale: Novalog Q4H.    Pain: 0350- PRN Fentanyl 50mcg IV given.    Gtts: Heparin @ 1500u/hr.   -2014: Xa 0.10, Bolus + Increased 150u/hr  -0340: Xa 0.14, Bolus + Increased 150u/hr

## 2022-01-16 NOTE — PROGRESS NOTES
[facility] ICU PROGRESS NOTE  01/16/2022        Date of Service (when I saw the patient): 01/16/2022    ASSESSMENT:  Amanda Richardson is a 56 year old female with history of MS with baclofen pump in place, neurogenic bladder, recurrent UTIs, chronic pain syndrome, DM II, hypertension, hyperlipidemia, CKD, Non-hodgkin's lymphoma, Obesity who was admitted on 12/23/21 after presenting to ED with altered mental status. Patient was intubated, treated for septic shock due to UTI, blood Cx (+) E fecalis. She was extubated 12/30/21, transferred to floor 12/31/21.  S/p  left therapeutic thoracentesis (500 ml) on 1/5/22 for hypoxia . RRT 1/8/22 for severe hypoxia prompting intubation and ICU transfer. Septic shock, ARDS and hypoxic respiratory failure.     CHANGES and MAJOR THINGS TODAY:   - FWF to 100 ml q 1 hr  - Metolazone 5 mg x 1  - lasix 10 mg IV x 1  - Magnesium Sulfate 2 gm (in addition to protocol)  - CHEST XRAY  - ABG  - discontinue Propofol  - Precedex gtt   - BMP noon     PLAN:    Neuro/ pain/ sedation:  # MS, s/p baclofen pump  # Acute metabolic encephalopathy  # Depression   # Sedation and anagelsia for ventilator compliance  - Pain: oxycodone PRN, Fentanyl IV PRN.    - baclofen pump implanted (f/b Dr Griffin, [place], out-patient)   - RASS goal 0 to -1   - Sedation: Propofol  gtt - discontinue   - start Precedex gtt    - off Versed 1/11/22  - Encephalopathy documented in prior hospitalist notes prior to intubation  - Cymbalta on hold given inability to crush while intubated      Pulmonary care:   # Acute hypoxemic hypercarbic respiratory failure, intubated 1/8  # HCAP  # Left pleural effusion, s/p thoracentesis 1/5  # Mucous plugging, s/p bronchoscopy 1/8 and 1/9   # Left hemothorax s/p CT 1/9/22   Ventilation Mode: CMV/AC  (Continuous Mandatory Ventilation/ Assist Control)  FiO2 (%): 60 %  Rate Set (breaths/minute): 26 breaths/min  Tidal Volume Set (mL): 410 mL  PEEP (cm H2O): 10 cmH2O  Pressure Support (cm  H2O): 8 cmH2O  Oxygen Concentration (%): 50 %  Resp: 20     - Several bronchs for mucous plugging. Continue mucolytic therapy with Mucomyst and Albuterol and CPT. (can change 3% Nebs to PRN as Mucomyst ordered)  - Chest tube in Left chest - daily CXR  - CT of chest 1/9/22 with complete atelectasis of left lung, multiple loculations               - unclear when hemothorax occured, question if from prior thoracentesis on 1/5/22  - thoracic consulted for left hemothorax 160cc/24 hours from chest tube, heparin has been restarted without evidence of bleeding. CT stripped today d/t possible clot in tubing   - 1/16 CHEST XRAY: pending  - ABG today     Cardiovascular:    # Septic shock   # Paroxysmal atrial fibrillation  # CHF  # Hyperlipidemia  - Monitor hemodynamic status. Goal MAP >65. Off pressors   - Continue PO Amiodarone. Lytes replaced.  K>4.0,Mag >2.5  - Continue 81 mg Aspirin  - 1/1 ECHO Normal LV function and seize. EF 60-65%. RV mildly dilated.   - 1/9: normal EF 65-70. left ventricle normal. Left ventricular systolic function is normal. No regional wall motion abnormalities noted. right ventricle is normal in size and function.  - solucortef started 1/9/22--> on taper       GI:   # Severe protein calorie malnutrition  # Cholelithiasis   # Diarrhea, s/p rectal tube   - PPI  - Continue TF at goal (20 ml/hr) - RD following, appreciate recs  - Cholelithiasis present without cholecystitis in CT A/P 1/3. Repeat hepatic panel in the setting of fever, increasing pressor requirements and leukocytosis.   - CTABP 1/9/22:   left pleural effusion with areas of high-density suggesting hemorrhagic contents. Complete atelectasis of the left lung. Small right pleural effusion associated with groundglass opacities and interlobular septal thickening in the right lung could be related to infection or pulmonary edema. Cholelithiasis and Nonobstructing nephrolithiasis.  - LFT's normal and bilirubin normal.     Renal/ Fluid  Balance:    # JULIANNE, improving   # hypervolemia  # hypokalemia  # Hypernatremia, likely 2/2 Zosyn   - Creatinine baseline 0.64, peaked at 2.03, now down-trending 0.69 (baseline 0.8-1.03 per epic)    - 1/9/22:  FeNA ~ 0.1%  - 1/3 CT A/P with non obstructing renal stones.   - 1/9/21:  US renal: No hydronephrosis, non-obstructing stones.                - seen by urology 1/5, no acute intervention as stone non-obstructing and no evidence of hydronephrosis of US, follow up as OP for definite stone removal with Dr Sapp.   - renal following, appreciate cares and recommendations for this patient   - K 3.4, replacement protocol, give additional this AM with diuresis   - Hypernatremia. Na 145 this am.  Free water deficit 2.3L, decrease free water to 100mL every 1 hour, and give metolazone/Lasix    - BMP repeat        Endocrine:    # DM II  # Relative adrenal insufficiency   - holding PTA oral agents   - Sliding scale for diabetes management. Goal to keep BG <180.   - High intensity insulin   - steroid wean entered      ID/ Antibiotics:  # HCAP  # Enterococcus faecalis bacteremia 2/2 urinary source- treated - Completed E fecalis bacteremia treatment 1/7  - Antibiotics: Zosyn x 7 days (1/11-1/18)   - follow culture results:  - Appreciate ID following, discussed with ID. No indication for anti-fungals at this time.     Culture:   - 1/8:  MRSA swab positive 1/8   - COVID-19 PCR negative.   - 1/9: BC x 2, UA with reflex  - 1/9: Cultures pending: BAL.   - 1/5: pleural fluid culture negative.      Heme:   # RIJ DVT (1/5)   # LIJ DVT  # superficial thrombus in cephalic vein from mid to proximal forearm  1/5  # Anemia of chronic illness  # Anemia due to blood loss   - Vascular surgery consulted 1/5/22. Plan for treatment of heparin gtt, plan for 12 weeks total for DVT's.  **unable to remove right internal jugular, no suitable veins for placement of PIV's (see  VAD RN's note, although ideal to remove line and treat, will need to keep  central line and treat through this given clots in RUE and left internal jugular clots.       MSK:  # MS  # Acute severe weakness and deconditioning d/t critical illness   - Physical Therapy/OT following, appreciate recs  - per family: BL W/C bound but able to self-transfer, dress independently, feeds herself independently      Lines/ tubes/ drains:  - RIJ TLC, L axillary arterial line, ETT, ramos, [place].      Disposition:  - [facility] ICU     Family: Patient's spouse Fernando updated by phone today     Time spent on this Encounter   Billing:  I spent 60 minutes bedside and on the inpatient unit today managing the critical care of Amanda Richardson in relation to the issues listed in this note.      Armida Wright  ====================================  INTERVAL HISTORY:  Course reviewed.Increased WOB, Propofol started d/t increased anxiety and vent dyssynchrony. Patient not moving RLE but is spontaneously moving others.      OBJECTIVE:   1. VITAL SIGNS:   Temp:  [98.6  F (37  C)-99.1  F (37.3  C)] 98.6  F (37  C)  Pulse:  [] 103  Resp:  [20-34] 20  BP: (143)/(67) 143/67  MAP:  [61 mmHg-114 mmHg] 100 mmHg  Arterial Line BP: ()/(44-84) 150/66  FiO2 (%):  [50 %-60 %] 60 %  SpO2:  [89 %-99 %] 94 %  Ventilation Mode: CMV/AC  (Continuous Mandatory Ventilation/ Assist Control)  FiO2 (%): 60 %  Rate Set (breaths/minute): 26 breaths/min  Tidal Volume Set (mL): 410 mL  PEEP (cm H2O): 10 cmH2O  Pressure Support (cm H2O): 8 cmH2O  Oxygen Concentration (%): 50 %  Resp: 20    2. INTAKE/ OUTPUT:   I/O last 3 completed shifts:  In: 5074.55 [I.V.:994.55; NG/GT:3600]  Out: 5785 [Urine:4025; Emesis/NG output:600; Stool:1000; Chest Tube:160]    3. PHYSICAL EXAMINATION:  General:  opens eyes to voice and spontaneous but slower d/t propofol infusion, appears uncomfortable,     HEENT/Neuro: pupils 3mm and reactive. ETT present and secured, following commands on UEs    Pulm/Resp: diminished on LUE more than yesterday, LLE remains  diminished, R side clear with dim bases, CT present with sang/serosanguinous drainage, stripped tubing d/t possible clot in tubing.   CV: RRR, S1/S2  Abdomen: obese, abdomen soft and compressible, baclofen pump noted on palpitation   : (+) ramos, urine yellow   Incisions/Skin: fragile, edematous, healing ecchymosis present, chronic skin changes in BLE, groin folds reddened   MSK/Extremities: chronic lymphedema in BLE. R >L.  Generalized moderate to severe edema     4. INVESTIGATIONS:   Arterial Blood Gases   Recent Labs   Lab 01/14/22  1514 01/14/22  0349 01/13/22  0801 01/12/22  0338   PH 7.35 7.35 7.34* 7.33*   PCO2 64* 63* 62* 60*   PO2 139* 108* 91 139*   HCO3 35* 35* 34* 31*     Complete Blood Count   Recent Labs   Lab 01/16/22  0445 01/15/22  2044 01/15/22  1257 01/15/22  0544 01/14/22  2013 01/14/22  1250 01/14/22  0345   WBC 18.0*  --   --  16.9*  --  18.9* 18.1*   HGB 7.5* 7.7* 7.4* 7.3*   < > 7.5*  7.5* 7.1*     --   --  320  --  324 299    < > = values in this interval not displayed.     Basic Metabolic Panel  Recent Labs   Lab 01/16/22  0444 01/16/22  0443 01/16/22  0020 01/15/22  2042 01/15/22  1704 01/15/22  1659 01/15/22  1301 01/15/22  1034 01/15/22  0907 01/15/22  0544 01/14/22  0353 01/14/22  0345   *  --   --   --   --  147*  --   --   --  148*  --  146*   POTASSIUM 3.4  --   --   --   --  3.3*  3.3*  --  3.4  --  3.1*  --  3.7   CHLORIDE 109  --   --   --   --  111*  --   --   --  112*  --  111*   CO2 35*  --   --   --   --  35*  --   --   --  34*  --  33*   BUN 40*  --   --   --   --  45*  --   --   --  49*  --  62*   CR 0.69  --   --   --   --  0.80  --   --   --  0.86  --  1.00   * 150* 147* 154*   < > 160*   < >  --    < > 164*   < > 165*    < > = values in this interval not displayed.     Liver Function Tests  Recent Labs   Lab 01/09/22  1612   INR 1.37*     Pancreatic Enzymes  No lab results found in last 7 days.  Coagulation Profile  Recent Labs   Lab 01/09/22  1612    INR 1.37*       5. RADIOLOGY:   Recent Results (from the past 24 hour(s))   XR Chest Port 1 View    Narrative    EXAM: XR CHEST PORTABLE 1 VIEW  LOCATION: [facility]  DATE/TIME: 01/15/2022, 8:17 AM    INDICATION: Monitoring left pleural effusion.  COMPARISON: Chest x-ray 01/14/2022.      Impression    IMPRESSION:   1.  ET tube is 4.3 cm proximal to the ron. Right neck central line  tip is stable at the low SVC. Enteric feeding tube is present.  2.  Stable consolidation and atelectasis at the left lung base. Stable  small left pleural fluid. Stable bilateral vascular congestion and  mild edema pattern. Stable trace right pleural fluid. Stable  cardiomegaly.    POPPY SALINAS MD         SYSTEM ID:  [id number]       =========================================

## 2022-01-16 NOTE — PROGRESS NOTES
[facility] ICU RESPIRATORY NOTE      Date of Admission: 12/23/2021    Date of Intubation (most recent): 1/8/22    Reason for Mechanical Ventilation:  Respiratory Failure    Number of Days on Mechanical Ventilation: 9    Met Criteria for Spontaneous Breathing Trial: No    Reason for No Spontaneous Breathing Trial: Per MD    Significant Events Today: none    ABG Results:   Recent Labs   Lab 01/16/22  1147 01/14/22  1514 01/14/22  0349 01/13/22  0801   PH 7.39 7.35 7.35 7.34*   PCO2 60* 64* 63* 62*   PO2 81 139* 108* 91   HCO3 37* 35* 35* 34*   O2PER 60 70 70 60       Current Vent Settings: Ventilation Mode: CMV/AC  (Continuous Mandatory Ventilation/ Assist Control)  FiO2 (%): 60 %  Rate Set (breaths/minute): 26 breaths/min  Tidal Volume Set (mL): 410 mL  PEEP (cm H2O): 8 cmH2O  Pressure Support (cm H2O): 8 cmH2O  Oxygen Concentration (%): 60 %  Resp: 26    Skin Assessment: intact.     Plan: Continue to monitor and assess respiratory status while in ICU    Amal Ali, RRT

## 2022-01-16 NOTE — PROGRESS NOTES
Neuro: Patient alert most of the day. Placed on propofol for becoming anxious and fighting vent around 18:00. Had been off prop for previous 24 hours. Should turn off at 06:00 on 1/16. Sensation in all extremities. Gross movements in arms and left leg, but not able to move RLE.     Cardiac: SR w/BBB and 1st AVB. HTN. Chest tube minimal output.     Resp: CMV mode intubation. PEEP @ 10, O2 @ 50%. Sating around 93%. RR 26, Vt 410.    GI: NG tube feed @ 20/hr, flush @ 150/hr. OG to LIS, 500ml output. Rectal tube output of 400 ml. 2 leaks today fixed.     : Bobo with adequate output.     Pain: only used 5 mg oxycodone suspension once today.     Heparin @ 1800/hr. Recheck for 00:00.     Patient up to chair today for 2 hours. Tolerated well.  visited for a few hours this afternoon and sat with patient.

## 2022-01-16 NOTE — PLAN OF CARE
Neuro: Pt alert with a RASS -1 to -2. Propofol for mild sedation infusing.  Responds/opens eyes to voice. BUE purposeful motor response, BLE withdraws from painful stimulation. Inconsistently follows commands w/ hand grasp but no other movement of extremities. PERRLA.       CV/Tele: SR w BBB and 1drg AVB.     Resp: LS diminished. Chest tube in place, minimal to no drainage of dark red output.     GI/: Bobo patent and draining. Rectal tube remains in place w/ scant amount of stool leaking.     Skin: scattered bruises to upper and lower extremities. Blanchable redness to coccyx, abdominal folds and perineum. Miconazole powder and nystatin applied.    Plan: try to wean from sedation and vent today.

## 2022-01-17 NOTE — PROGRESS NOTES
[facility]    Infectious Disease Progress Note    Date of Service : 01/17/2022     Assessment:  1. Acute hypoxic respiratory failure related to hemothorax. S/p bronchoscopy. BAL cxs only show candida albicans , nasal MRSA PCR positive. S/p chest tube placement, exudative hemorrhagic fluid, pleural fluid and blood cxs negative thus far  2. 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 TTE without evidence of endocarditis, blood cxs cleared rapidly .  4. Perineal wounds, candida intertrigo, funguria related to cathetrization  5. JULIANNE, transaminitis related to sepsis improved  6. 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 Has bilateral renal calculi , urology has evaluated and recommended stone management at a later date.  7. S.epidermidis bacteremia likely due to culture contamination. Polymicrobial bacteremia seems unlikely. Pump site does not appear infected, there is no overlying erythema and CT abdomen done twice did not show stranding or fluid collection around pump site.   8. Atrial fibrillation, on amiodarone and in sinus rhythm  9. Densities along the pelvic sidewall, unclear whether chronic fluid collection/seroma. or adenopathy. In view of sepsis, wound recommend re evaluation with imaging .  10. Cholelithiasis  11. Chronic medical conditions - DM, HTN, hyperlipidemia, morbidly obese body habitus.     Recommendations  1. Treat for VAP Zosyn for 7-10 days. No apparent empyema with pleural fluid cxs being negative, noted increasing resp requirements, plan for repeat CT scan will follow.   2. Treated for E.fecalis sepsis  3. No other source of infection, afebrile now   4. Suspect low grade fever and ongoing  leukocytosis is related to hemothorax , chest tube in place, not draining well, will follow up on repeat imaging     Greer Corea MD    Interval History   Remains sedated and intubated  Lying in the bed, incontinent of stool, rectal tube in place   Discussed with nursing bedside     Physical Exam   Temp: 98.7  F (37.1  C) Temp src: Axillary BP: 137/66 Pulse: 101   Resp: 26 SpO2: 92 % O2 Device: Mechanical Ventilator    Vitals:    01/14/22 0200 01/15/22 0200 01/17/22 0300   Weight: 129.8 kg (286 lb 2.5 oz) 130.2 kg (287 lb 0.6 oz) 128.3 kg (282 lb 13.6 oz)     Vital Signs with Ranges  Temp:  [98.7  F (37.1  C)-99  F (37.2  C)] 98.7  F (37.1  C)  Pulse:  [] 101  Resp:  [22-35] 26  BP: (137)/(66) 137/66  MAP:  [60 mmHg-139 mmHg] 73 mmHg  Arterial Line BP: ()/() 102/54  SpO2:  [90 %-100 %] 92 %       Constitutional: intubated, sedated  Lungs:chest tube in place left sided  Cardiovascular: S1S2  Skin: chronic stasis changes bilateral lower extremities, perineal erythema, intertrigo, excoriated lesions  MS : BL LE lymphedema    Other:    Medications     baclofen (LIORESAL) intraTHECAL Internal Pump       dexmedetomidine 0.4 mcg/kg/hr (01/17/22 0815)     dextrose       heparin 2,400 Units/hr (01/17/22 0815)     NaCl 20 mL/hr at 01/17/22 0816       acetylcysteine  1 mL Nebulization Q6H     albuterol  2.5 mg Nebulization Q6H     amiodarone  400 mg Oral or Feeding Tube Daily     aspirin  81 mg Oral or Feeding Tube Daily     chlorhexidine  15 mL Mouth/Throat Q12H     [Held by provider] DULoxetine  60 mg Oral BID     fiber modular (NUTRISOURCE FIBER)  2 packet Per Feeding Tube TID     influenza recomb quadrivalent PF  0.5 mL Intramuscular Prior to discharge     insulin aspart  1-12 Units Subcutaneous Q4H     lactobacillus rhamnosus (GG)  1 capsule Oral BID     [Held by provider] metoprolol tartrate  25 mg Oral or Feeding Tube BID     miconazole   Topical BID     multivitamins w/minerals  15 mL Per Feeding Tube  Daily     nystatin   Topical BID     pantoprazole  40 mg Per Feeding Tube QAM AC    Or     pantoprazole (PROTONIX) IV  40 mg Intravenous QAM AC     piperacillin-tazobactam  4.5 g Intravenous Q6H     [Held by provider] polyethylene glycol  17 g Oral or Feeding Tube Daily     protein modular  2 packet Per Feeding Tube TID     [Held by provider] sennosides  5 mL Oral or Feeding Tube BID     sodium chloride (PF)  3 mL Intracatheter Q8H       Data   All microbiology laboratory data reviewed.  Recent Labs   Lab Test 01/17/22  0453 01/16/22  1843 01/16/22  1148 01/16/22  0445 01/15/22  1257 01/15/22  0544   WBC 16.6*  --   --  18.0*  --  16.9*   HGB 7.5*  7.5* 8.0* 8.2* 7.5*   < > 7.3*   HCT 26.5*  --   --  25.8*  --  25.1*   MCV 90  --   --  90  --  88     --   --  362  --  320    < > = values in this interval not displayed.     Recent Labs   Lab Test 01/17/22  0453 01/16/22  1148 01/16/22  0444   CR 0.73 0.68 0.69     Recent Labs   Lab Test 12/23/21  2121   SED 14     Imaging  1/14 CXR   EXAM: XR CHEST PORT 1 VIEW  LOCATION: [facility]  DATE/TIME: 1/14/2022 5:47 AM     INDICATION: interval montoring of left hemothorax  COMPARISON: 01/13/2022                                                                      IMPRESSION: Endotracheal tube, enteric tubes, right IJ line and left chest tube. Bilateral infiltrates. Moderate to large left effusion similar    1/9 CT abdomen/pelvis  EXAM: CT CHEST/ABDOMEN/PELVIS WITH CONTRAST  LOCATION: [facility]  DATE/TIME: 01/09/2022, 3:06 PM     INDICATION: Sepsis.  COMPARISON: 12/23/2021.  TECHNIQUE: CT scan of the chest, abdomen, and pelvis was performed following injection of IV contrast. Multiplanar reformats were obtained. Dose reduction techniques were used.   CONTRAST: 124 mL Isovue-[id number].      FINDINGS:   LUNGS AND PLEURA: Complete atelectasis of the left lung. There is a large pleural effusion with evidence of multiple  internal loculations. There is high-density within the pleural fluid in the left hemithorax suggesting pockets of hemorrhage. A small   right pleural effusion is present. There is interlobular septal thickening in the right lung and areas of groundglass opacity.     MEDIASTINUM/AXILLAE: Right internal jugular central line terminates in the SVC. An endotracheal tube is appropriately positioned. A feeding tube extends into the distal duodenum. A nasogastric tube terminates in the stomach. The thyroid gland is   unremarkable. No pathologically enlarged thoracic or axillary lymph nodes. Caliber of the thoracic aorta is within normal limits.     CORONARY ARTERY CALCIFICATION: Mild.     HEPATOBILIARY: Gallstones are present in the gallbladder. No worrisome liver lesions.     PANCREAS: Normal.     SPLEEN: Normal.     ADRENAL GLANDS: Normal.     KIDNEYS/BLADDER: Nonobstructing 7 mm calculus in the right renal collecting system. A few tiny calcifications in the left kidney, decreased from prior. No hydronephrosis or worrisome renal mass. Urinary bladder decompressed by a Bobo catheter.     BOWEL: No bowel obstruction or inflammatory change. Normal appendix. No free air or intra-abdominal abscess. No ascites.     LYMPH NODES: Normal.     VASCULATURE: Nonaneurysmal aortic atherosclerosis.     PELVIC ORGANS: Fat-containing lesion in the left pelvis measuring 1.8 cm (series 3, image 262) likely a small dermoid.     MUSCULOSKELETAL: Chronic right femoral neck fracture noted. Lumbosacral fusion hardware noted. No destructive bone lesions.                                                                      IMPRESSION:  1.  There is a large, multiloculated, left pleural effusion with areas of high-density suggesting hemorrhagic contents. The possibility of empyema is not excluded.  2.  Complete atelectasis of the left lung.  3.  Small right pleural effusion associated with groundglass opacities and interlobular septal thickening  in the right lung could be related to infection or pulmonary edema.  4.  Cholelithiasis.  5.  Nonobstructing nephrolithiasis.

## 2022-01-17 NOTE — PROGRESS NOTES
[facility] ICU PROGRESS NOTE  01/17/2022        Date of Service (when I saw the patient): 01/17/2022    ASSESSMENT:  Amanda Richardson is a 56 year old female with history of MS with baclofen pump in place, neurogenic bladder, recurrent UTIs, chronic pain syndrome, DM II, hypertension, hyperlipidemia, CKD, Non-hodgkin's lymphoma, Obesity who was admitted on 12/23/21 after presenting to ED with altered mental status. Patient was intubated, treated for septic shock due to UTI, blood Cx (+) E fecalis. She was extubated 12/30/21, transferred to floor 12/31/21.  S/p  left therapeutic thoracentesis (500 ml) on 1/5/22 for hypoxia . RRT 1/8/22 for severe hypoxia prompting intubation and ICU transfer. Septic shock, ARDS and hypoxic respiratory failure.     CHANGES and MAJOR THINGS TODAY:   -Hypoxic event this afternoon with desaturation, PEEP increased to 12 and fio2 100%with recovery  ---Thoracic surgery - no plans for aggressive intervention for L hemothorax - will obtain CT chest to characterize residual hemothorax  --- Diuresis with 20 IV lasix q12  -plan for ongoing discussion for Trach/PEG   -Continuing precedex for now, will plan to switch to propofol if vent dyssynchrony present    PLAN:    Neuro/ pain/ sedation:  # MS, s/p baclofen pump  # Acute metabolic encephalopathy  # Depression   # Sedation and anagelsia for ventilator compliance  - Pain: oxycodone PRN, Fentanyl IV PRN.    - baclofen pump implanted (f/b Dr Griffin, [place], out-patient)   - RASS goal 0 to -1   - Sedation:  cont with precedex gtt, if dyssynchronous plan for Propofol  gtt    - off Versed 1/11/22  - Encephalopathy documented in prior hospitalist notes prior to intubation  - Cymbalta on hold given inability to crush while intubated      Pulmonary care:   # Acute hypoxemic hypercarbic respiratory failure, intubated 1/8  # HCAP  # Left pleural effusion, s/p thoracentesis 1/5  # Mucous plugging, s/p bronchoscopy 1/8 and 1/9   # Left hemothorax s/p CT  1/9/22   Ventilation Mode: CMV/AC  (Continuous Mandatory Ventilation/ Assist Control)  FiO2 (%): 60 %  Rate Set (breaths/minute): 26 breaths/min  Tidal Volume Set (mL): 410 mL  PEEP (cm H2O): 12 cmH2O  Oxygen Concentration (%): 100 %  Resp: 26     - Several bronchs for mucous plugging. Continue mucolytic therapy with Mucomyst and Albuterol and CPT. (can change 3% Nebs to PRN as Mucomyst ordered)  - Chest tube in Left chest - daily CXR  - CT of chest 1/9/22 with complete atelectasis of left lung, multiple loculations               - unclear when hemothorax occured, question if from prior thoracentesis on 1/5/22  - thoracic consulted for left hemothorax 110cc/24 hours from chest tube, heparin has been restarted without evidence of bleeding.   - Strip CT Q shift  - 1/16 CHEST XRAY: R basilar effusion, LLL Effusion remains  - ABG today   -discussed with Thoracic surgery - repeat chest CT to evaluate retained hemothorax, no lytics    Cardiovascular:    # Septic shock   # Paroxysmal atrial fibrillation  # CHF  # Hyperlipidemia  - Monitor hemodynamic status. Goal MAP >65. Off pressors   - Continue PO Amiodarone. Lytes replaced.  K>4.0,Mag >2.5  - Continue 81 mg Aspirin  - 1/1 ECHO Normal LV function and seize. EF 60-65%. RV mildly dilated.   - 1/9: normal EF 65-70. left ventricle normal. Left ventricular systolic function is normal. No regional wall motion abnormalities noted. right ventricle is normal in size and function.  - solucortef started 1/9/22--> on taper       GI:   # Severe protein calorie malnutrition  # Cholelithiasis   # Diarrhea, s/p rectal tube   - PPI  - Continue TF at goal (20 ml/hr) - RD following, appreciate recs  - Cholelithiasis present without cholecystitis in CT A/P 1/3. Repeat hepatic panel in the setting of fever, increasing pressor requirements and leukocytosis.   - CTABP 1/9/22:   left pleural effusion with areas of high-density suggesting hemorrhagic contents. Complete atelectasis of the left  lung. Small right pleural effusion associated with groundglass opacities and interlobular septal thickening in the right lung could be related to infection or pulmonary edema. Cholelithiasis and Nonobstructing nephrolithiasis.  - LFT's normal and bilirubin normal.     Renal/ Fluid Balance:    # JULIANNE, improving   # hypervolemia  # hypokalemia  # Hypernatremia, likely 2/2 Zosyn   - Creatinine baseline 0.64, peaked at 2.03, stable near baseline 0.73 (baseline 0.8-1.03 per epic)    - 1/9/22:  FeNA ~ 0.1%  - 1/3 CT A/P with non obstructing renal stones.   - 1/9/21:  US renal: No hydronephrosis, non-obstructing stones.                - seen by urology 1/5, no acute intervention as stone non-obstructing and no evidence of hydronephrosis of US, follow up as OP for definite stone removal with Dr Sapp.   - renal following, appreciate cares and recommendations for this patient   - K 3.4, replacement protocol, give additional this AM with diuresis   - Hypernatremia. Na 144 this am.  Free water deficit 2.3L, continue free water to 100mL every 1 hour, lasix 20 mg IV q12 x2 doses   - BMP repeat in AM        Endocrine:    # DM II  # Relative adrenal insufficiency   - holding PTA oral agents   - Sliding scale for diabetes management. Goal to keep BG <180.   - High intensity insulin   - steroid wean entered      ID/ Antibiotics:  # HCAP  # Enterococcus faecalis bacteremia 2/2 urinary source- treated - Completed E fecalis bacteremia treatment 1/7  - Antibiotics: Zosyn x 7 days (1/11-1/18) empiric for HCAP  - follow culture results:  - Appreciate ID following, discussed with ID. No indication for anti-fungals at this time.     Culture:   - 1/8:  MRSA swab positive 1/8   - COVID-19 PCR negative.   - 1/9: BC x 2, UA with reflex  - 1/9: Cultures pending: BAL.   - 1/5: pleural fluid culture negative.      Heme:   # RIJ DVT (1/5)   # LIJ DVT  # superficial thrombus in cephalic vein from mid to proximal forearm  1/5  # Anemia of chronic  illness  # Anemia due to blood loss   - Vascular surgery consulted 1/5/22. Plan for treatment of heparin gtt, plan for 12 weeks total for DVT's.  **unable to remove right internal jugular, no suitable veins for placement of PIV's (see  VAD RN's note, although ideal to remove line and treat, will need to keep central line and treat through this given clots in RUE and left internal jugular clots.       MSK:  # MS  # Acute severe weakness and deconditioning d/t critical illness   - Physical Therapy/OT following, appreciate recs  - per family: BL W/C bound but able to self-transfer, dress independently, feeds herself independently      Lines/ tubes/ drains:  - RIJ TLC, L axillary arterial line, ETT, TONI ramos.      Disposition:  - [facility]     Family: Patient's spouse Fernando updated by phone today     Time spent on this Encounter   Billing:  I spent 50 minutes bedside and on the inpatient unit today managing the critical care of Amanda Richardson in relation to the issues listed in this note.      Nicolas Foster DO  [facility] Fellow  ====================================  INTERVAL HISTORY:  Worsened hypoxia, increased fio2 to 100% and PEEP to 12 with recovery, weaning fio2 now. CT chest this afternoon to evaluate clot burden in L chest and R effusion.     OBJECTIVE:   1. VITAL SIGNS:   Temp:  [98.7  F (37.1  C)] 98.7  F (37.1  C)  Pulse:  [] 101  Resp:  [25-35] 26  BP: (137)/(66) 137/66  MAP:  [60 mmHg-139 mmHg] 73 mmHg  Arterial Line BP: ()/() 102/54  SpO2:  [90 %-100 %] 92 %  Ventilation Mode: CMV/AC  (Continuous Mandatory Ventilation/ Assist Control)  FiO2 (%): 60 %  Rate Set (breaths/minute): 26 breaths/min  Tidal Volume Set (mL): 410 mL  PEEP (cm H2O): 12 cmH2O  Oxygen Concentration (%): 100 %  Resp: 26    2. INTAKE/ OUTPUT:   I/O last 3 completed shifts:  In: 3784.66 [I.V.:1514.66; NG/GT:1830]  Out: 5105 [Urine:3825; Emesis/NG output:20; Stool:1150; Chest Tube:110]    3. PHYSICAL  EXAMINATION:  General:  opens eyes to voice and spontaneous, appears comfortable on precedex gtt   HEENT/Neuro: pupils 3mm and reactive. ETT present and secured, following commands on UEs    Pulm/Resp: diminished on LUE more than yesterday, LLE remains diminished, R side clear with dim bases, CT present with dark sanguinous drainage, clots present when stripped tubing   CV: RRR, S1/S2  Abdomen: obese, abdomen soft and compressible, baclofen pump noted on palpitation   : (+) ramos, urine yellow   Incisions/Skin: fragile, edematous, healing ecchymosis present, chronic skin changes in BLE, groin folds reddened   MSK/Extremities: chronic lymphedema in BLE. R >L.  Generalized moderate to severe edema     4. INVESTIGATIONS:   Arterial Blood Gases   Recent Labs   Lab 01/17/22  0625 01/16/22  1147 01/14/22  1514 01/14/22  0349   PH 7.40 7.39 7.35 7.35   PCO2 60* 60* 64* 63*   PO2 70* 81 139* 108*   HCO3 37* 37* 35* 35*     Complete Blood Count   Recent Labs   Lab 01/17/22  1239 01/17/22  0453 01/16/22  1843 01/16/22  1148 01/16/22  0445 01/15/22  1257 01/15/22  0544 01/14/22  2013 01/14/22  1250   WBC  --  16.6*  --   --  18.0*  --  16.9*  --  18.9*   HGB 8.1* 7.5*  7.5* 8.0* 8.2* 7.5*   < > 7.3*   < > 7.5*  7.5*   PLT  --  354  --   --  362  --  320  --  324    < > = values in this interval not displayed.     Basic Metabolic Panel  Recent Labs   Lab 01/17/22  1238 01/17/22  0843 01/17/22  0458 01/17/22  0453 01/16/22  2212 01/16/22  2146 01/16/22  1153 01/16/22  1148 01/16/22  0854 01/16/22  0444 01/15/22  1704 01/15/22  1659   NA  --   --   --  144  --   --   --  144  --  145*  --  147*   POTASSIUM  --   --   --  3.3*  --  3.1*  --  3.1*  --  3.4  --  3.3*  3.3*   CHLORIDE  --   --   --  105  --   --   --  107  --  109  --  111*   CO2  --   --   --  35*  --   --   --  35*  --  35*  --  35*   BUN  --   --   --  33*  --   --   --  40*  --  40*  --  45*   CR  --   --   --  0.73  --   --   --  0.68  --  0.69  --  0.80    * 159* 171* 187*   < >  --    < > 191*   < > 171*   < > 160*    < > = values in this interval not displayed.     Liver Function Tests  No lab results found in last 7 days.  Pancreatic Enzymes  No lab results found in last 7 days.  Coagulation Profile  No lab results found in last 7 days.    5. RADIOLOGY:   Recent Results (from the past 24 hour(s))   XR Chest Port 1 View    Narrative    EXAM: XR CHEST PORT 1 VIEW  LOCATION: [facility]  DATE/TIME: 1/17/2022 6:05 AM    INDICATION: CT placement, pleural effusion monitoring.  COMPARISON: 1/16/2022      Impression    IMPRESSION: Endotracheal tube, enteric tube, right IJ central venous catheter, and left chest tube are all stable. Moderate to large left pleural effusion is unchanged. Questionable right pleural effusion not seen previously. Mild hazy infiltrate right   lower lung is improved compared with the prior study. Infiltrate and/or atelectasis left mid and lower lung not significantly changed. Heart size is stable. Aortic calcification. No significant bony abnormalities.       =========================================

## 2022-01-17 NOTE — PLAN OF CARE
Neuro: Pt alert with a RASS 0 to -1. Propofol stopped and changed to precedex for mild sedation.  Responds/opens eyes to voice and spontaneous. BUE purposeful motor response, BLE withdraws from painful stimulation. Follows commands w/ hand grasp and wiggles toes. PERRLA.       CV/Tele: SR w/ 1drg AVB. EKG complete for suspected a-flutter but no rhythm changes. Heparin infusing.      Resp: LS diminished. Chest tube in place, minimal to no drainage of dark red output. Vent at 410 TV, peep of 8, rate 26, and Fio2 was at 60% but now 75%. ABG pending, Lavage completed by RT for increased oxygen needs.     GI/: Bobo patent and draining. Rectal tube remains in place w/ scant amount of stool leaking.      Skin: scattered bruises to upper and lower extremities. Blanchable redness to coccyx, abdominal folds and perineum. Miconazole powder and nystatin applied.     Plan: try to wean from sedation and vent today and pressure support.

## 2022-01-17 NOTE — PROGRESS NOTES
CLINICAL NUTRITION SERVICES - REASSESSMENT NOTE      RECOMMENDATIONS FOR MD/PROVIDER TO ORDER:   Recommend consider check C-diff.   Recommendations Ordered by Registered Dietitian (RD):   Will increase TF rate slightly Novasource Renal to 25 mL/hr = 1200 kcals, 55 gm pro, 430 mL H20, 110 gm CHO, no fiber  Continue Prosource 2 packets TID = 240 kcals, 66 gm pro  Continue Nutrisource Fiber 2 packets TID = 90 kcals, 18 gm fiber  Total (TF + Prosource + Nutrisource Fiber): 1530 kcals (13 kcal/kg), 121 gm pro (1.8 gm/kg), 18 gm fiber   Malnutrition: (Updated 1/13)  % Weight Loss:  None noted  % Intake:  No decreased intake noted  Subcutaneous Fat Loss:  Orbital region moderate depletion  Muscle Loss:  Temporal region mild-moderate depletion  Fluid Retention:  Moderate 3+ per flowsheets      Malnutrition Diagnosis: Moderate malnutrition  In Context of:  Acute illness or injury       EVALUATION OF PROGRESS TOWARD GOALS   Diet:  NPO on vent    Nutrition Support:  TF continues at 20 mL/hr with fiber added yesterday as below:    Nutrition Support Enteral:  Type of Feeding Tube: Nasoduodenal   Enteral Frequency:  Continuous  Enteral Regimen: Novasource Renal at 20 mL/hr  Total Enteral Provisions: 960 kcal, 44 g protein, 88 g CHO, 0 g fiber, 344 mL H2O   Free Water Flush: 50 mL per hour   Certavite daily - to meet vitamin/mineral needs on low volume TF and per PI protocol      Propofol discontinued 1/16.  ProSource 2 pkt TID= 66 g protein, 240 kcal   Nutrisource Fiber 2 packets TID = 90 kcals, 18 gm fiber  Total (TF + Prosource + Nutrisource Fiber):  1290 kcals (11 kcal/kg and 97% energy needs), 110 gm pro (1.7 gm/kg and 98% pro needs), 18 gm fiber    Intake/Tolerance:    Stool Pattern has been 6-7 liquid stools per day over the w/e.  - Bowel meds Miralax and Senokot held  - [facility] for bowel health  - No C-Diff has been checked  1/13:  1000 mL  1/14:  450 mL  1/15:  1400 mL  1/16:  800 mL  1/17:  900 mL thus far  K 3.3 (L),  Mg 1.8 (NL), Phos 2.9 (NL), BUN 33 (H), Cr 0.73 (NL) - JULIANNE on CKD improved  BGM:  157, 157, 171, 159, 149, 148 - acceptable.  I/O: 3784/5105. Wt: 128.3 kg (up 7.3 kg from 1/7). Pt with 3+ generalized edema. Lasix for diuresis.    ASSESSED NUTRITION NEEDS:  Dosing Weight:  121 kg (energy - 1/7/21 weight);  66 kg (protein - IBW)  Estimated Energy Needs:  2613-7543 kcals (11-14 Kcal/Kg)  Justification: obese  Estimated Protein Needs: 112-132 grams protein (1.7-2 g pro/Kg)  Justification: wound healing, hypercatabolism with acute illness, obesity guidelines  and CKD    NEW FINDINGS:   Patient completed bacteremia treatment 1/7 and is currently on Zosyn.  Considering trach/PEG in near future.    Previous Goals (1/13):   Novasource Renal + ProSource + Propofol will meet % needs   Evaluation: Met, although meeting lower end of needs    Previous Nutrition Diagnosis (1/13):   No nutrition diagnosis identified at this time  Evaluation: Declining    CURRENT NUTRITION DIAGNOSIS  Altered GI function related to unknown etiology as evidenced by frequent liquid stools 6-7 per day with volume 450-1400 mL per day.    INTERVENTIONS  Recommendations / Nutrition Prescription  Will increase TF rate slightly Novasource Renal to 25 mL/hr = 1200 kcals, 55 gm pro, 430 mL H20, 110 gm CHO, no fiber  Continue Prosource 2 packets TID = 240 kcals, 66 gm pro  Continue Nutrisource Fiber 2 packets TID = 90 kcals, 18 gm fiber  Total (TF + Prosource + Nutrisource Fiber): 1530 kcals (13 kcal/kg), 121 gm pro (1.8 gm/kg), 18 gm fiber    Recommend consider check C-diff.    Implementation  Collaboration and Referral of Nutrition care - Pt was discussed during ICU interdisciplinary rounds this morning  EN Schedule - Modified TF rate in Epic as above    Goals  TF + Prosource + Nutrisource Fiber will meet % estimated needs  Stool pattern will improve (< 3 liquids stools and < 500 mL stool volume per day)    MONITORING AND EVALUATION:  Progress  towards goals will be monitored and evaluated per protocol and Practice Guidelines    Florecita Shaffer, JENNIFER, LD, CNSC

## 2022-01-17 NOTE — PROGRESS NOTES
CT Chest reviewed, large retained hemothorax remains with significant compressive hemothorax, small right effusion noted and interstitial edema, formal report pending. Discussed preliminary result with Throacic surgery and they feel patient would warrant Left VATS and washout however current vent setting are prohibitive (PEEP 12 fio2 80%). Will plan to continue treatment of presumed HCAP and diuresis with goal to wean vent to appropriate settings for VATS.    Additionally discussed the potential for trach/ PEG with  Fernando, and io indicated these would likely be required in the coming days, he indicated he would want to proceed with trach and PEG with goals of restorative care,

## 2022-01-17 NOTE — PLAN OF CARE
Weaned off Propofol, Dex started with improvement in demeanor, seems less anxious.   No vent wean today, hopefully tomorrow, work of breathing seems improved over the course of the day as well, enjoyed having her hair brushed and braided.  Stool output down a little Nutrifiber started this afternoon, minimal chest tube output, tube remains patent.   updated  at bedside today.

## 2022-01-17 NOTE — PROGRESS NOTES
[facility] ICU RESPIRATORY NOTE        Date of Admission: 12/23/2021     Date of Intubation (most recent): 1/8/22     Reason for Mechanical Ventilation:  Respiratory Failure     Number of Days on Mechanical Ventilation: 10     Met Criteria for Spontaneous Breathing Trial: No     Reason for No Spontaneous Breathing Trial: Per MD     Significant Events Today:  Heated circuit  And bacteria filter changed for  Circuit occluded while RN bagging patient.    Recent Labs   Lab 01/16/22  1147 01/14/22  1514 01/14/22  0349 01/13/22  0801   PH 7.39 7.35 7.35 7.34*   PCO2 60* 64* 63* 62*   PO2 81 139* 108* 91   HCO3 37* 35* 35* 34*   O2PER 60 70 70 60   Ventilation Mode: CMV/AC  (Continuous Mandatory Ventilation/ Assist Control)  FiO2 (%): 60 %  Rate Set (breaths/minute): 26 breaths/min  Tidal Volume Set (mL): 410 mL  PEEP (cm H2O): 8 cmH2O  Oxygen Concentration (%): 60 %  Resp: 26

## 2022-01-17 NOTE — PROGRESS NOTES
[facility] ICU RESPIRATORY NOTE        Date of Admission: 12/23/2021    Date of Intubation (most recent): 1/8/2022        Reason for Mechanical Ventilation: Respiratory failure    Number of Days on Mechanical Ventilation: 10    Met Criteria for Spontaneous Breathing Trial: No    Reason for No Spontaneous Breathing Trial: PEEP>8     Significant Events Today: Pt. Went to CT.    ABG Results:   Recent Labs   Lab 01/17/22  0625 01/16/22  1147 01/14/22  1514 01/14/22  0349   PH 7.40 7.39 7.35 7.35   PCO2 60* 60* 64* 63*   PO2 70* 81 139* 108*   HCO3 37* 37* 35* 35*   O2PER 75 60 70 70       Current Vent Settings: Ventilation Mode: CMV/AC  (Continuous Mandatory Ventilation/ Assist Control)  FiO2 (%): 60 %  Rate Set (breaths/minute): 26 breaths/min  Tidal Volume Set (mL): 410 mL  PEEP (cm H2O): 12 cmH2O  Oxygen Concentration (%): 80 %  Resp: 26      Skin Assessment: Intact    Plan: Continue to monitor.    Danay Husain

## 2022-01-18 NOTE — PROGRESS NOTES
[facility] ICU RESPIRATORY NOTE        Date of Admission: 12/23/2021    Date of Intubation (most recent): 1/8/22    Reason for Mechanical Ventilation: Respiratory Failure    Number of Days on Mechanical Ventilation: 11    Met Criteria for Spontaneous Breathing Trial: no    Reason for No Spontaneous Breathing Trial: PEEP>8    Significant Events Today: none    ABG Results:   Recent Labs   Lab 01/17/22  0625 01/16/22  1147 01/14/22  1514 01/14/22  0349   PH 7.40 7.39 7.35 7.35   PCO2 60* 60* 64* 63*   PO2 70* 81 139* 108*   HCO3 37* 37* 35* 35*   O2PER 75 60 70 70       Current Vent Settings: Ventilation Mode: CMV/AC  (Continuous Mandatory Ventilation/ Assist Control)  FiO2 (%): 85 %  Rate Set (breaths/minute): 26 breaths/min  Tidal Volume Set (mL): 410 mL  PEEP (cm H2O): 12 cmH2O  Oxygen Concentration (%): 80 %  Resp: 29      Skin Assessment: intact    Plan: Continue to monitor patient on full mechanical ventilation. Wean as able.    Francisco Patel, RT

## 2022-01-18 NOTE — PLAN OF CARE
Neuros unchanged. PT arouses to voice, follows commands, lethargic/obtunded with brief periods of awakeness. Vent setting unchanged. Chest tube to -20 water suction, no output, dressing CDI. Bobo with adequate UOP, IV LAsix given x1. Flexiseal WNL, 20ml output. NG to TF at goal with 50ml q1 hr flushes. Denies pain. Hgb q6 7.4. L brachial ART line WNL. R internal jugular PICC infusing Precedex gtt and Heparin gtt. Magnesium and Potassium replaced this morning.

## 2022-01-18 NOTE — PLAN OF CARE
Oxygen requirements up again today, with spontaneous desaturations several times over the course of 12 hours.  In consultation with Dr. Foster and then Dr. Garcia will attempt more aggressive diuresis, patient more alert/interactive at times but still with increased work of breathing.  Still with liquid stools, treatment in progress.  Heparin drip therapeutic now.  Spouse here and aware of the plan of care.

## 2022-01-18 NOTE — PROGRESS NOTES
[facility]    Infectious Disease Progress Note    Date of Service : 01/18/2022     Assessment:  1. Acute hypoxic respiratory failure related to hemothorax. S/p bronchoscopy. BAL cxs only show candida albicans , nasal MRSA PCR positive. S/p chest tube placement, exudative hemorrhagic fluid, pleural fluid and blood cxs negative thus far  2. 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 TTE without evidence of endocarditis, blood cxs cleared rapidly .  4. Perineal wounds, candida intertrigo, funguria related to cathetrization  5. JULIANNE, transaminitis related to sepsis improved  6. 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 Has bilateral renal calculi , urology has evaluated and recommended stone management at a later date.  7. S.epidermidis bacteremia likely due to culture contamination. Polymicrobial bacteremia seems unlikely. Pump site does not appear infected, there is no overlying erythema and CT abdomen done twice did not show stranding or fluid collection around pump site.   8. Atrial fibrillation, on amiodarone and in sinus rhythm  9. Densities along the pelvic sidewall, unclear whether chronic fluid collection/seroma. or adenopathy. In view of sepsis, wound recommend re evaluation with imaging .  10. Cholelithiasis  11. Chronic medical conditions - DM, HTN, hyperlipidemia, morbidly obese body habitus.   12. Repeat CT : Stable large left and small right pleural effusion with a  left-sided chest tube in place. The effusion on the left has  hyperdense components, either due to hemorrhagic or tenacious  material    Recommendations  1. Treat for VAP Zosyn for 7-10 days. No apparent empyema with pleural fluid cxs being  negative, hemothorax   2. Treated for E.fecalis sepsis  3. No other source of infection, afebrile now   4. Suspect low grade fever and ongoing leukocytosis is related to hemothorax , chest tube in place,     Greer Corea MD    Interval History   Remains sedated and intubated  Lying in the bed, incontinent of stool, rectal tube in place   Discussed with nursing bedside     Physical Exam   Temp: 99  F (37.2  C) Temp src: Axillary   Pulse: 87   Resp: 24 SpO2: 98 % O2 Device: Mechanical Ventilator    Vitals:    01/15/22 0200 01/17/22 0300 01/18/22 0500   Weight: 130.2 kg (287 lb 0.6 oz) 128.3 kg (282 lb 13.6 oz) 128.4 kg (283 lb 1.1 oz)     Vital Signs with Ranges  Temp:  [98  F (36.7  C)-99.2  F (37.3  C)] 99  F (37.2  C)  Pulse:  [] 87  Resp:  [13-41] 24  MAP:  [57 mmHg-122 mmHg] 86 mmHg  Arterial Line BP: ()/(44-91) 124/58  FiO2 (%):  [80 %] 80 %  SpO2:  [84 %-98 %] 98 %       Constitutional: intubated, sedated  Lungs:chest tube in place left sided  Cardiovascular: S1S2  Skin: chronic stasis changes bilateral lower extremities, perineal erythema, intertrigo, excoriated lesions  MS : BL LE lymphedema    Other:    Medications     baclofen (LIORESAL) intraTHECAL Internal Pump       dexmedetomidine 0.6 mcg/kg/hr (01/18/22 0645)     dextrose       heparin 2,400 Units/hr (01/18/22 0013)     NaCl 20 mL/hr at 01/17/22 2105       acetylcysteine  1 mL Nebulization Q6H     albuterol  2.5 mg Nebulization Q6H     amiodarone  400 mg Oral or Feeding Tube Daily     aspirin  81 mg Oral or Feeding Tube Daily     chlorhexidine  15 mL Mouth/Throat Q12H     [Held by provider] DULoxetine  60 mg Oral BID     fiber modular (NUTRISOURCE FIBER)  2 packet Per Feeding Tube TID     furosemide  20 mg Intravenous Q12H     influenza recomb quadrivalent PF  0.5 mL Intramuscular Prior to discharge     insulin aspart  1-12 Units Subcutaneous Q4H     lactobacillus rhamnosus (GG)  1 capsule Oral BID     [Held by provider] metoprolol tartrate   25 mg Oral or Feeding Tube BID     miconazole   Topical BID     multivitamins w/minerals  15 mL Per Feeding Tube Daily     nystatin   Topical BID     pantoprazole  40 mg Per Feeding Tube QAM AC    Or     pantoprazole (PROTONIX) IV  40 mg Intravenous QAM AC     piperacillin-tazobactam  4.5 g Intravenous Q6H     [Held by provider] polyethylene glycol  17 g Oral or Feeding Tube Daily     potassium chloride  40 mEq Oral BID     protein modular  2 packet Per Feeding Tube TID     [Held by provider] sennosides  5 mL Oral or Feeding Tube BID     sodium chloride (PF)  3 mL Intracatheter Q8H       Data   All microbiology laboratory data reviewed.  Recent Labs   Lab Test 01/18/22  0442 01/17/22  2110 01/17/22  1239 01/17/22  0453 01/16/22  1148 01/16/22  0445   WBC 16.5*  --   --  16.6*  --  18.0*   HGB 7.4*  7.4* 7.4* 8.1* 7.5*  7.5*   < > 7.5*   HCT 25.7*  --   --  26.5*  --  25.8*   MCV 91  --   --  90  --  90     --   --  354  --  362    < > = values in this interval not displayed.     Recent Labs   Lab Test 01/18/22 0442 01/17/22 0453 01/16/22  1148   CR 0.71 0.73 0.68     Recent Labs   Lab Test 12/23/21  2121   SED 14     Imaging  1/14 CXR   EXAM: XR CHEST PORT 1 VIEW  LOCATION: [facility]  DATE/TIME: 1/14/2022 5:47 AM     INDICATION: interval montoring of left hemothorax  COMPARISON: 01/13/2022                                                                      IMPRESSION: Endotracheal tube, enteric tubes, right IJ line and left chest tube. Bilateral infiltrates. Moderate to large left effusion similar    1/9 CT abdomen/pelvis  EXAM: CT CHEST/ABDOMEN/PELVIS WITH CONTRAST  LOCATION: [facility]  DATE/TIME: 01/09/2022, 3:06 PM     INDICATION: Sepsis.  COMPARISON: 12/23/2021.  TECHNIQUE: CT scan of the chest, abdomen, and pelvis was performed following injection of IV contrast. Multiplanar reformats were obtained. Dose reduction techniques were used.    CONTRAST: 124 mL Isovue-370.      FINDINGS:   LUNGS AND PLEURA: Complete atelectasis of the left lung. There is a large pleural effusion with evidence of multiple internal loculations. There is high-density within the pleural fluid in the left hemithorax suggesting pockets of hemorrhage. A small   right pleural effusion is present. There is interlobular septal thickening in the right lung and areas of groundglass opacity.     MEDIASTINUM/AXILLAE: Right internal jugular central line terminates in the SVC. An endotracheal tube is appropriately positioned. A feeding tube extends into the distal duodenum. A nasogastric tube terminates in the stomach. The thyroid gland is   unremarkable. No pathologically enlarged thoracic or axillary lymph nodes. Caliber of the thoracic aorta is within normal limits.     CORONARY ARTERY CALCIFICATION: Mild.     HEPATOBILIARY: Gallstones are present in the gallbladder. No worrisome liver lesions.     PANCREAS: Normal.     SPLEEN: Normal.     ADRENAL GLANDS: Normal.     KIDNEYS/BLADDER: Nonobstructing 7 mm calculus in the right renal collecting system. A few tiny calcifications in the left kidney, decreased from prior. No hydronephrosis or worrisome renal mass. Urinary bladder decompressed by a Bobo catheter.     BOWEL: No bowel obstruction or inflammatory change. Normal appendix. No free air or intra-abdominal abscess. No ascites.     LYMPH NODES: Normal.     VASCULATURE: Nonaneurysmal aortic atherosclerosis.     PELVIC ORGANS: Fat-containing lesion in the left pelvis measuring 1.8 cm (series 3, image 262) likely a small dermoid.     MUSCULOSKELETAL: Chronic right femoral neck fracture noted. Lumbosacral fusion hardware noted. No destructive bone lesions.                                                                      IMPRESSION:  1.  There is a large, multiloculated, left pleural effusion with areas of high-density suggesting hemorrhagic contents. The possibility of empyema is  not excluded.  2.  Complete atelectasis of the left lung.  3.  Small right pleural effusion associated with groundglass opacities and interlobular septal thickening in the right lung could be related to infection or pulmonary edema.  4.  Cholelithiasis.  5.  Nonobstructing nephrolithiasis.

## 2022-01-18 NOTE — PROGRESS NOTES
[facility] ICU PROGRESS NOTE  01/18/2022        Date of Service (when I saw the patient): 01/18/2022    ASSESSMENT:  Amanda Richardson is a 56 year old female with history of MS with baclofen pump in place, neurogenic bladder, recurrent UTIs, chronic pain syndrome, DM II, hypertension, hyperlipidemia, CKD, Non-hodgkin's lymphoma, Obesity who was admitted on 12/23/21 after presenting to ED with altered mental status. Patient was intubated, treated for septic shock due to UTI, blood Cx (+) E fecalis. She was extubated 12/30/21, transferred to floor 12/31/21.  S/p  left therapeutic thoracentesis (500 ml) on 1/5/22 for hypoxia . RRT 1/8/22 for severe hypoxia prompting intubation and ICU transfer. Septic shock, ARDS and hypoxic respiratory failure.     Currently Retained Hemothorax is driving ongoin o2 requirement and pt would benefit from VATS, continuing to diurese to decrease vent settings to be more appropriate for VATS. Ongoing discussions with Dr. Gonzalez regarding optimal timing.    CHANGES and MAJOR THINGS TODAY:   -slightly improved oxygen requirement in setting of diuresis yesterday  -continue lasix again today, 20 mg dose without adequate diuresis, 40 mg ordered for this PM  ---Thoracic surgery - plans for VATS when vent optimized  -plan for ongoing discussion for Trach/PEG   -Continuing precedex for now, will plan to switch to propofol if vent dyssynchrony present  -increase heparin to high intensity protocol in setting of DVT given high risk of decompensation if PE develops  -OOB to chair to optimize respiratory effort    PLAN:    Neuro/ pain/ sedation:  # MS, s/p baclofen pump  # Acute metabolic encephalopathy  # Depression   # Sedation and anagelsia for ventilator compliance  - Pain: oxycodone PRN, Fentanyl IV PRN.    - baclofen pump implanted (f/b Dr Griffin, [place], out-patient)   - RASS goal 0 to -1   - Sedation:  cont with precedex gtt, if dyssynchronous plan for Propofol  gtt    - off Versed 1/11/22  -  Encephalopathy documented in prior hospitalist notes prior to intubation  - Cymbalta on hold given inability to crush while intubated      Pulmonary care:   # Acute hypoxemic hypercarbic respiratory failure, intubated 1/8  # HCAP  # Left pleural effusion, s/p thoracentesis 1/5  # Mucous plugging, s/p bronchoscopy 1/8 and 1/9   # Left hemothorax s/p CT 1/9/22   Ventilation Mode: CMV/AC  (Continuous Mandatory Ventilation/ Assist Control)  FiO2 (%): 85 %  Rate Set (breaths/minute): 26 breaths/min  Tidal Volume Set (mL): 410 mL  PEEP (cm H2O): 12 cmH2O  Oxygen Concentration (%): 85 %  Resp: 25     - Several bronchs for mucous plugging. Continue mucolytic therapy with Mucomyst and Albuterol and CPT. (can change 3% Nebs to PRN as Mucomyst ordered)  - Chest tube in Left chest - daily CXR  - CT of chest 1/9/22 with complete atelectasis of left lung, multiple loculations               - unclear when hemothorax occured, question if from prior thoracentesis on 1/5/22  - thoracic consulted for left hemothorax scant OP cc/24 hours from chest tube, heparin has been restarted without evidence of bleeding.   - Strip CT Q shift  1/17 -  CT chest showing retained hemothorax of large volume with compressive atelectasis, right lung with continued signs of pulmonary edema  Aggressive diuresis and plan for VATS and washout of left chest when vent optimized.    Cardiovascular:    # Septic shock   # Paroxysmal atrial fibrillation  # CHF  # Hyperlipidemia  - Monitor hemodynamic status. Goal MAP >65. Off pressors   - Continue PO Amiodarone. Lytes replaced.  K>4.0,Mag >2.5  - Continue 81 mg Aspirin  - 1/1 ECHO Normal LV function and seize. EF 60-65%. RV mildly dilated.   - 1/9: normal EF 65-70. left ventricle normal. Left ventricular systolic function is normal. No regional wall motion abnormalities noted. right ventricle is normal in size and function.  - solucortef started 1/9/22--> on taper       GI:   # Severe protein calorie  malnutrition  # Cholelithiasis   # Diarrhea, s/p rectal tube   - PPI  - Continue TF at goal (20 ml/hr) - RD following, appreciate recs  - Cholelithiasis present without cholecystitis in CT A/P 1/3. Repeat hepatic panel in the setting of fever, increasing pressor requirements and leukocytosis.   - CTABP 1/9/22:   left pleural effusion with areas of high-density suggesting hemorrhagic contents. Complete atelectasis of the left lung. Small right pleural effusion associated with groundglass opacities and interlobular septal thickening in the right lung could be related to infection or pulmonary edema. Cholelithiasis and Nonobstructing nephrolithiasis.  - LFT's normal and bilirubin normal.     Renal/ Fluid Balance:    # JULIANNE, improving   # hypervolemia  # hypokalemia  # Hypernatremia, likely 2/2 Zosyn   - Creatinine baseline 0.64, peaked at 2.03, stable near baseline 0.73 (baseline 0.8-1.03 per epic)    - 1/9/22:  FeNA ~ 0.1%  - 1/3 CT A/P with non obstructing renal stones.   - 1/9/21:  US renal: No hydronephrosis, non-obstructing stones.                - seen by urology 1/5, no acute intervention as stone non-obstructing and no evidence of hydronephrosis of US, follow up as OP for definite stone removal with Dr Sapp.   - renal following, appreciate cares and recommendations for this patient   - K 2.8 replacement protocol, give additional this AM and PM with diuresis   - Hypernatremia. Na remains at 144 this am. Continue free water to 100mL every 1 hour, lasix 20 mg IV this AM and 40 mg this PM   - BMP repeat in AM      Endocrine:    # DM II  # Relative adrenal insufficiency   - holding PTA oral agents   - Sliding scale for diabetes management. Goal to keep BG <180.   - High intensity insulin   - steroid wean entered      ID/ Antibiotics:  # HCAP  # Enterococcus faecalis bacteremia 2/2 urinary source- treated - Completed E fecalis bacteremia treatment 1/7  - Antibiotics: Zosyn x 7 days (1/11-1/18) empiric for HCAP to  complete today, AM procal  - follow culture results: NGTD  - Appreciate ID following, discussed with ID. No indication for anti-fungals at this time.     Culture:   - 1/8:  MRSA swab positive 1/8   - COVID-19 PCR negative.   - 1/9: BC x 2, UA with reflex  - 1/9: Cultures pending: BAL.   - 1/5: pleural fluid culture negative.      Heme:   # RIJ DVT (1/5)   # LIJ DVT  # superficial thrombus in cephalic vein from mid to proximal forearm  1/5  # Anemia of chronic illness  # Anemia due to blood loss   - Vascular surgery consulted 1/5/22. Plan for treatment of heparin gtt, plan for 12 weeks total for DVT's.  **unable to remove right internal jugular, no suitable veins for placement of PIV's (see  VAD RN's note, although ideal to remove line and treat, will need to keep central line and treat through this given clots in RUE and left internal jugular clots.   -Heparin Gtt increased to high intensity, monitor for clinical bleeding      MSK:  # MS  # Acute severe weakness and deconditioning d/t critical illness   - Physical Therapy/OT following, appreciate recs, OOB to chair daily  - per family: BL W/C bound but able to self-transfer, dress independently, feeds herself independently      Lines/ tubes/ drains:  - RIJ TLC, L axillary arterial line, ETT, TONI ramos.      Disposition:  - [facility]     Family: Patient's spouse Fernando updated by phone today     Time spent on this Encounter   Billing:  I spent 30 minutes bedside and on the inpatient unit today managing the critical care of Amanda Richardson in relation to the issues listed in this note.      Nicolas Foster DO  [facility] Fellow  ====================================  INTERVAL HISTORY:  Improving o2 requirement with ongoing diuresis, goal of net negative 1-2 L today, additional dose of lasix ordered for this evening, aggressive electrolyte replacement of K and Mg, OOB to chair, ongoing discussions with thoracic regarding optimal timing of VATS    OBJECTIVE:   1. VITAL  SIGNS:   Temp:  [98  F (36.7  C)-99  F (37.2  C)] 98  F (36.7  C)  Pulse:  [74-99] 74  Resp:  [13-33] 25  MAP:  [61 mmHg-100 mmHg] 83 mmHg  Arterial Line BP: ()/(44-75) 112/63  FiO2 (%):  [80 %-85 %] 85 %  SpO2:  [90 %-98 %] 93 %  Ventilation Mode: CMV/AC  (Continuous Mandatory Ventilation/ Assist Control)  FiO2 (%): 85 %  Rate Set (breaths/minute): 26 breaths/min  Tidal Volume Set (mL): 410 mL  PEEP (cm H2O): 12 cmH2O  Oxygen Concentration (%): 85 %  Resp: 25    2. INTAKE/ OUTPUT:   I/O last 3 completed shifts:  In: 4591.78 [I.V.:2436.78; NG/GT:1630]  Out: 5225 [Urine:5100; Stool:120; Chest Tube:5]    3. PHYSICAL EXAMINATION:  General:  opens eyes to voice and spontaneous, appears comfortable on precedex gtt   HEENT/Neuro: pupils 3mm and reactive. ETT present and secured, following commands on UEs    Pulm/Resp: diminished on LUE more than yesterday, LLE remains diminished, R side clear with dim bases, CT present with scant dark sanguinous drainage, clots present when stripped tubing   CV: RRR, S1/S2  Abdomen: obese, abdomen soft and compressible, baclofen pump noted on palpitation   : (+) ramos, urine yellow   Incisions/Skin: fragile, edematous, healing ecchymosis present, chronic skin changes in BLE, groin folds reddened   MSK/Extremities: chronic lymphedema in BLE. R >L.  Generalized moderate to severe edema     4. INVESTIGATIONS:   Arterial Blood Gases   Recent Labs   Lab 01/17/22  0625 01/16/22  1147 01/14/22  1514 01/14/22  0349   PH 7.40 7.39 7.35 7.35   PCO2 60* 60* 64* 63*   PO2 70* 81 139* 108*   HCO3 37* 37* 35* 35*     Complete Blood Count   Recent Labs   Lab 01/18/22  1229 01/18/22  0442 01/17/22  2110 01/17/22  1239 01/17/22  0453 01/16/22  1148 01/16/22  0445 01/15/22  1257 01/15/22  0544   WBC  --  16.5*  --   --  16.6*  --  18.0*  --  16.9*   HGB 7.8* 7.4*  7.4* 7.4* 8.1* 7.5*  7.5*   < > 7.5*   < > 7.3*   PLT  --  332  --   --  354  --  362  --  320    < > = values in this interval not  displayed.     Basic Metabolic Panel  Recent Labs   Lab 01/18/22  1556 01/18/22  1226 01/18/22  0905 01/18/22  0449 01/18/22  0442 01/17/22  0458 01/17/22  0453 01/16/22  2212 01/16/22  2146 01/16/22  1153 01/16/22  1148 01/16/22  0854 01/16/22  0444   NA  --   --   --   --  144  --  144  --   --   --  144  --  145*   POTASSIUM  --   --   --   --  2.8*  --  3.3*  --  3.1*  --  3.1*  --  3.4   CHLORIDE  --   --   --   --  102  --  105  --   --   --  107  --  109   CO2  --   --   --   --  37*  --  35*  --   --   --  35*  --  35*   BUN  --   --   --   --  30  --  33*  --   --   --  40*  --  40*   CR  --   --   --   --  0.71  --  0.73  --   --   --  0.68  --  0.69   * 159* 143* 169* 183*   < > 187*   < >  --    < > 191*   < > 171*    < > = values in this interval not displayed.     Liver Function Tests  No lab results found in last 7 days.  Pancreatic Enzymes  No lab results found in last 7 days.  Coagulation Profile  No lab results found in last 7 days.    5. RADIOLOGY:   Recent Results (from the past 24 hour(s))   XR Chest Port 1 View    Narrative    EXAM: XR CHEST PORT 1 VIEW  LOCATION: [facility]  DATE/TIME: 1/18/2022 5:38 AM    INDICATION: CT placement, pleural effusion monitoring  COMPARISON: Radiograph and CT of the chest yesterday.      Impression    IMPRESSION: Endotracheal tube in satisfactory position terminating 5.2 cm above the ron. Right internal jugular venous catheter tip in the mid SVC. Enteric tube passes below the diaphragm and the inferior margin radiograph. Left chest tube in   position. Worsening opacification of the left hemithorax consistent with enlargement of a now large left pleural effusion and atelectasis/infiltrate of the mid and lower left lung. Stable hazy alveolar opacities throughout the right lung. Heart size   difficult to evaluate. No pneumothorax visible.       =========================================

## 2022-01-19 NOTE — PROGRESS NOTES
"[facility] Nurse Inpatient Wound Assessment     Reason for follow up: Evaluate and treat \"bilateral posterior thighs, buttocks, coccyx, vaginal area\" \"coccyx/sacrum, groin\"    Assessment  ABD folds, under breast, axilla - intact and free from satellite lesions   Buttock skin breakdown due to moisture, friction, pressure, chronic illness   Posterior thighs and groin and perineal skin breakdown due to MASD - unable to reassess  Due to traumatic event in the am.  Treatment Plan  Coccyx: Every 3 days and PRN  1. Clean wound with saline or MicroKlenz Spray, pat dry  2. Wipe / \"clean\" the surrounding periwound tissue with skin prep (Cavilon No Sting Skin Prep #[id number]) and allow to dry. This will help protect periwound and help dressing adherence  3. Press a Mepilex  Sacral Dressing (PS#[id number])  to the area, making sure to conform nicely to skin curvatures.   4. Time and date dressing change  NOTE  -Reposition pt side to side christiano when in bed, every 2 hours-get the pt way over on side to completely offload pressure. This will benefit skin and respiratory function   -Keep heels elevated and floating on pillows at all times. Try using at least 2 pillows under each calf.  -When up to the chair pt needs to fully offload every 2 hours and use a chair cushion if needed     Groin, pannus, breasts, axilla: daily and PRN  1. cleanse with kemi cleanse and protect and ciro dry wipes (or water only and ciro dry wipes). AVOID pre moistened wipes.   2. Fan folds to ensure they are completely dry  3. Dust a thin layer of antifungal powder and gently rub into place  4. Stop antifungal powder and switch to using interdry Ag  Laying a single layer of fabric in the skin fold, placing one edge into the base of the fold. Gently smooth the rest of the fabric over the skin, keeping it flat. Leave at least 2 inches of fabric exposed outside the skin fold.    Orders Updated  Recommended provider order: None, at " "this time  [facility] Nurse follow-up plan: weekly  Nursing to notify the Provider(s) and re-consult the [facility] Nurse if wound(s) deteriorates or new skin concern.    Patient History  According to provider note(s):  \"56-year-old woman with complex history including non-Hodgkin's lymphoma, multiple sclerosis on baclofen pump, hypertension, DM2, with recurrent infections who was admitted 12/23 for decreased level of consciousness. Possible concern for codeine excess.\"  Self extubated this am 1/19/2021.     Objective Data  Containment of urine/stool: Incontinence Protocol, Incontinent pad in bed, Indwelling catheter and Internal fecal management    Active Diet Order:  Orders Placed This Encounter      NPO for Medical/Clinical Reasons Except for: Meds    Output:   I/O last 3 completed shifts:  In: 4424.3 [I.V.:1874.3; NG/GT:1950]  Out: 4983 [Urine:4175; Stool:780; Chest Tube:28]    Risk Assessment:   Sensory Perception: 3-->slightly limited  Moisture: 3-->occasionally moist  Activity: 2-->chairfast  Mobility: 3-->slightly limited  Nutrition: 3-->adequate  Friction and Shear: 2-->potential problem  Issac Score: 16                          Labs:   Recent Labs   Lab 01/19/22  0429   HGB 7.1*   WBC 17.3*       Physical Exam  Skin inspection: abdominal folds, perineal skin posterior buttocks and posterior thighs     1/11      Wound Location:  Buttocks, posterior thighs - did not see due to self extubated and in restraints   Date of last photo:  1-11  Wound History: per [facility] charting 2019, patient with bilat buttock unstageable pressure injuries. Per patient spouse, MASD and chronic pressure/friction skin breakdown also noted at home and at TCU prior to admission   Measurements (length x width x depth, in cm): few pea sided superficial erosions  Wound Base:  Few scattered of superficial erosions and bleeding  Tunneling: N/A   Undermining: N/A  Palpation of the wound bed: firm   Periwound skin: scar tissue and resolving erythema  Color: " pink and faint purple  Temperature: normal   Drainage: none  Description of drainage: none  Odor: none  Pain: none noted  Wound Location:  Pannus/ abdominal folds - intact healed     Interventions  Current support surface: Standard  Low air loss mattress  Current off-loading measures: Pillows under calves, Pillows and Wedge positioning system  Visual inspection of wound(s) completed  Wound Care: changed POC to use Interdry Ag  Supplies: at bedside, discussed with RN and discussed with patient  Education provided: skin fold management, Moisture management   Discussed plan of care with Patient and Nurse    Mery TIJERINA CWOCN

## 2022-01-19 NOTE — PROGRESS NOTES
No significant changes today. Following commands, dex gtt to keep RASS -1. Hemodynamically stable. FiO2 85%, peep 12. Tube feed at goal with flush, diuresing with lasix, AUO via ramos. Rectal tube draining. Skin unchanged. Up in chair,  by bedside, supportive. Will cont to closely monitor.

## 2022-01-19 NOTE — PROGRESS NOTES
Care Management Follow Up    Length of Stay (days): 26    Expected Discharge Date: 01/21/2022     Concerns to be Addressed: Care Conference  Patient plan of care discussed at interdisciplinary rounds: Yes    Patient/Family in Agreement with the Plan: yes    Private pay costs discussed: Not applicable    Additional Information:  Writer was notified by MD that patient will need a care conference. Writer called patient's spouse, Fernando, he is not available until late tomorrow but could come on Friday, 1/21 at 3:30pm. Writer will check with ICU MD to see if they are available.      Rosa Maria Valenzuela RN

## 2022-01-19 NOTE — PROGRESS NOTES
Patient self-extubated at around 0900 this morning. Anesthesia called, patient was ventilated via bag valve mask with PEEP valve for 10-15 minutes until oxygen saturations reached 85%. Patient intubated with size 7.5 tube 23cm at the teeth. Colorimetric CO2 detector used to confirm proper placement along with bilateral breath sounds. Ventilator settings: PEEP increased to 17 cm H2O for the time being, FiO2 at 100%. Patient was bronched shortly after the event and a small amount of secretions were removed from patient's upper lobes. RT to continue to follow.

## 2022-01-19 NOTE — PLAN OF CARE
Neuros unchanged. VSS ex. Tmax 99 Axillary. Pt tolerating vent with Precedex gtt, vent setting unchanged. Heparine gtt increase to 2550 Units/hr. Chest tube to -20 water suction with 18ml dark red output. Bobo with approx 2.1L output, LAsix given x1. Flexiseal WNL, 300ml output. NG to TF at goal with 50ml q1 hr flushes. Denies pain. Hgb q6 7.1. Left brachial ART line WNL. R internal jugular PICC infusing. Potassium replaced this morning.

## 2022-01-19 NOTE — PROGRESS NOTES
[facility] ICU PROGRESS NOTE  01/19/2022        Date of Service (when I saw the patient): 01/19/2022    ASSESSMENT:  Amanda Richardson is a 56 year old female with history of MS with baclofen pump in place, neurogenic bladder, recurrent UTIs, chronic pain syndrome, DM II, hypertension, hyperlipidemia, CKD, Non-hodgkin's lymphoma, Obesity who was admitted on 12/23/21 after presenting to ED with altered mental status. Patient was intubated, treated for septic shock due to UTI, blood Cx (+) E fecalis. She was extubated 12/30/21, transferred to floor 12/31/21.  S/p  left therapeutic thoracentesis (500 ml) on 1/5/22 for hypoxia . RRT 1/8/22 for severe hypoxia prompting intubation and ICU transfer. Septic shock, ARDS and hypoxic respiratory failure.     Currently Retained Hemothorax is driving ongoin o2 requirement and pt would benefit from VATS, continuing to diurese to decrease vent settings to be more appropriate for VATS. Ongoing discussions with Dr. Gonzalez regarding optimal timing.    EVENT NOTE 1/19/2022  This AM patient self extubated and rapidly desaturated, ICU team immediately responded to patient bedside and placed OPA and began bagging patient, she transiently became bradycardic and hypotensive however pulses remained present, patient was able to be ventilated and o2 saturations improved into 80s, Patient was then re-intubated by anaesthesia and 7.5 ET tube was placed, during intubation there was noted to be significant gastric content in oropharynx. Following intubation saturations remained ion low 80s and PEEP increased to 17. Bronch was performed following intubation and moderate amount of tan pink secretions noted but no bilious fluid noted, no purulence noted.      Since then her o2 requirement has returned back to more or less where she started the day so her increased o2 requirement was likely 2/2 de-recruitment and less likely from an aspiration.    CHANGES and MAJOR THINGS TODAY:   -see above event  note  -continue lasix again today, 20 mg dose without adequate diuresis, 40 mg ordered for this PM  ---Thoracic surgery - plans for VATS when vent optimized  - ABG to be collected this afternoon now that respiratory status has stabilized   -plan for ongoing discussion for Trach/PEG  -Continuing precedex for now, will plan to switch to propofol if vent dyssynchrony present  -increase heparin to high intensity protocol in setting of DVT given high risk of decompensation if PE develops  -OOB to chair to optimize respiratory effort    PLAN:    Neuro/ pain/ sedation:  # MS, s/p baclofen pump  # Acute metabolic encephalopathy  # Depression   # Sedation and anagelsia for ventilator compliance  - Pain: oxycodone PRN, Fentanyl IV PRN.    - baclofen pump implanted (f/b Dr Griffin, [place], out-patient)   - RASS goal 0 to -1   - Sedation:  Switched to propofol following self extubation, plan to change back to precedex gtt if not events overnight,   - off Versed 1/11/22  - Encephalopathy documented in prior hospitalist notes prior to intubation  - Cymbalta on hold given inability to crush while intubated      Pulmonary care:   # Acute hypoxemic hypercarbic respiratory failure, intubated 1/8  # HCAP  # Left pleural effusion, s/p thoracentesis 1/5  # Mucous plugging, s/p bronchoscopy 1/8 and 1/9   # Left hemothorax s/p CT 1/9/22   Ventilation Mode: CMV/AC  (Continuous Mandatory Ventilation/ Assist Control)  FiO2 (%): 100 %  Rate Set (breaths/minute): 26 breaths/min  Tidal Volume Set (mL): 410 mL  PEEP (cm H2O): 17 cmH2O  Oxygen Concentration (%): 100 %  Resp: 20     - Several bronchs for mucous plugging. Continue mucolytic therapy with Mucomyst and Albuterol and CPT. (can change 3% Nebs to PRN as Mucomyst ordered)  - Left CT appear occluded, will attempt dornase to re-establish patency  - CT of chest 1/9/22 with complete atelectasis of left lung, multiple loculations               - unclear when hemothorax occured, question if from  prior thoracentesis on 1/5/22  - thoracic consulted for left hemothorax scant OP from chest tube, heparin has been restarted without evidence of bleeding.   - Strip CT Q shift  1/17 -  CT chest showing retained hemothorax of large volume with compressive atelectasis, right lung with continued signs of pulmonary edema   -Aggressive diuresis and plan for VATS and washout of left chest when vent optimized.    Cardiovascular:    # Septic shock   # Paroxysmal atrial fibrillation  # CHF  # Hyperlipidemia  - Monitor hemodynamic status. Goal MAP >65. Off pressors   - Continue PO Amiodarone. Lytes replaced.  K>4.0,Mag >2.5  - Continue 81 mg Aspirin  - 1/1 ECHO Normal LV function and seize. EF 60-65%. RV mildly dilated.   - 1/9: normal EF 65-70. left ventricle normal. Left ventricular systolic function is normal. No regional wall motion abnormalities noted. right ventricle is normal in size and function.  - solucortef started 1/9/22--> on taper       GI:   # Severe protein calorie malnutrition  # Cholelithiasis   # Diarrhea, s/p rectal tube   - PPI  - Continue TF at goal (20 ml/hr) - RD following, appreciate recs  - Cholelithiasis present without cholecystitis in CT A/P 1/3. Repeat hepatic panel in the setting of fever, increasing pressor requirements and leukocytosis.   - CTABP 1/9/22:   left pleural effusion with areas of high-density suggesting hemorrhagic contents. Complete atelectasis of the left lung. Small right pleural effusion associated with groundglass opacities and interlobular septal thickening in the right lung could be related to infection or pulmonary edema. Cholelithiasis and Nonobstructing nephrolithiasis.  - LFT's normal and bilirubin normal.     Renal/ Fluid Balance:    # JULIANNE, improving   # hypervolemia  # hypokalemia  # Hypernatremia, likely 2/2 Zosyn   - Creatinine baseline 0.64, peaked at 2.03, stable near baseline 0.73 (baseline 0.8-1.03 per epic)    - 1/9/22:  FeNA ~ 0.1%  - 1/3 CT A/P with non  obstructing renal stones.   - 1/9/21:  US renal: No hydronephrosis, non-obstructing stones.                - seen by urology 1/5, no acute intervention as stone non-obstructing and no evidence of hydronephrosis of US, follow up as OP for definite stone removal with Dr Sapp.   - renal following, appreciate cares and recommendations for this patient   - K 2.8 replacement protocol, give additional this AM and PM with diuresis   - Hypernatremia. Na remains at 141 this am. Decrease FW to 25cc q1hour  - lasix 40mg q12 x2 today  - BMP repeat in AM      Endocrine:    # DM II  # Relative adrenal insufficiency   - holding PTA oral agents   - Sliding scale for diabetes management. Goal to keep BG <180.   - High intensity insulin   - steroid wean entered      ID/ Antibiotics:  # HCAP  # Enterococcus faecalis bacteremia 2/2 urinary source- treated - Completed E fecalis bacteremia treatment 1/7  - Antibiotics: Zosyn x 7 days (1/11-present) continuing until 1/21 following aspiration event, empiric for HCAP, plan for pro sonido on 1/21  - follow culture results: NGTD  - Appreciate ID following, discussed with ID. No indication for anti-fungals at this time.     Culture:   - 1/8:  MRSA swab positive 1/8   - COVID-19 PCR negative.   - 1/9: BC x 2, UA with reflex  - 1/9: Cultures pending: BAL.   - 1/5: pleural fluid culture negative.      Heme:   # RIJ DVT (1/5)   # LIJ DVT  # superficial thrombus in cephalic vein from mid to proximal forearm  1/5  # Anemia of chronic illness  # Anemia due to blood loss   - Vascular surgery consulted 1/5/22. Plan for treatment of heparin gtt, plan for 12 weeks total for DVT's.  **unable to remove right internal jugular, no suitable veins for placement of PIV's (see  VAD RN's note, although ideal to remove line and treat, will need to keep central line and treat through this given clots in RUE and left internal jugular clots.   -Heparin Gtt increased to high intensity on 1/19, monitor for clinical  bleeding      MSK:  # MS  # Acute severe weakness and deconditioning d/t critical illness   - Physical Therapy/OT following, appreciate recs, OOB to chair daily  - per family: BL W/C bound but able to self-transfer, dress independently, feeds herself independently      Lines/ tubes/ drains:  - RIJ TLC, L axillary arterial line, ETT, TOIN ramos.      Disposition:  - [facility] ICU     Family: Patient's spouse Fernando updated by phone today     Time spent on this Encounter   Billing:  I spent 60 minutes bedside and on the inpatient unit today managing the critical care of Amanda Richardson in relation to the issues listed in this note.      Nicolas Foster DO  [facility] Fellow  ====================================  INTERVAL HISTORY: Self Extubation, see above event note prior to that uneventful overnight    OBJECTIVE:   1. VITAL SIGNS:   Temp:  [98  F (36.7  C)-99  F (37.2  C)] 98.9  F (37.2  C)  Pulse:  [74-99] 76  Resp:  [15-43] 20  MAP:  [67 mmHg-100 mmHg] 71 mmHg  Arterial Line BP: ()/(51-75) 101/51  FiO2 (%):  [80 %-100 %] 100 %  SpO2:  [90 %-99 %] 92 %  Ventilation Mode: CMV/AC  (Continuous Mandatory Ventilation/ Assist Control)  FiO2 (%): 100 %  Rate Set (breaths/minute): 26 breaths/min  Tidal Volume Set (mL): 410 mL  PEEP (cm H2O): 17 cmH2O  Oxygen Concentration (%): 100 %  Resp: 20    2. INTAKE/ OUTPUT:   I/O last 3 completed shifts:  In: 4424.3 [I.V.:1874.3; NG/GT:1950]  Out: 4983 [Urine:4175; Stool:780; Chest Tube:28]    3. PHYSICAL EXAMINATION:  General:  opens eyes to voice and spontaneous, appears comfortable on precedex gtt   HEENT/Neuro: pupils 3mm and reactive. ETT present and secured, following commands on UEs    Pulm/Resp: diminished on LUE more than yesterday, LLE remains diminished, R side clear with dim bases, CT present with scant dark sanguinous drainage, clots present when stripped tubing   CV: RRR, S1/S2  Abdomen: obese, abdomen soft and compressible, baclofen pump noted on palpitation   : (+)  ramos, urine yellow   Incisions/Skin: fragile, edematous, healing ecchymosis present, chronic skin changes in BLE, groin folds reddened   MSK/Extremities: chronic lymphedema in BLE. R >L.  Generalized moderate to severe edema     4. INVESTIGATIONS:   Arterial Blood Gases   Recent Labs   Lab 01/17/22  0625 01/16/22  1147 01/14/22  1514 01/14/22  0349   PH 7.40 7.39 7.35 7.35   PCO2 60* 60* 64* 63*   PO2 70* 81 139* 108*   HCO3 37* 37* 35* 35*     Complete Blood Count   Recent Labs   Lab 01/19/22  0429 01/18/22  1950 01/18/22  1229 01/18/22  0442 01/17/22  1239 01/17/22  0453 01/16/22  1148 01/16/22  0445   WBC 17.3*  --   --  16.5*  --  16.6*  --  18.0*   HGB 7.1* 7.1* 7.8* 7.4*  7.4*   < > 7.5*  7.5*   < > 7.5*     --   --  332  --  354  --  362    < > = values in this interval not displayed.     Basic Metabolic Panel  Recent Labs   Lab 01/19/22  0814 01/19/22  0433 01/19/22  0428 01/19/22  0028 01/18/22  1958 01/18/22  1737 01/18/22  0449 01/18/22  0442 01/17/22  0458 01/17/22  0453 01/16/22  1153 01/16/22  1148   NA  --   --  141  --   --   --   --  144  --  144  --  144   POTASSIUM  --   --  3.7  --   --  3.4  --  2.8*  --  3.3*   < > 3.1*   CHLORIDE  --   --  102  --   --   --   --  102  --  105  --  107   CO2  --   --  38*  --   --   --   --  37*  --  35*  --  35*   BUN  --   --  33*  --   --   --   --  30  --  33*  --  40*   CR  --   --  0.65  --   --   --   --  0.71  --  0.73  --  0.68   * 158* 171* 137*   < >  --    < > 183*   < > 187*   < > 191*    < > = values in this interval not displayed.     Liver Function Tests  No lab results found in last 7 days.  Pancreatic Enzymes  No lab results found in last 7 days.  Coagulation Profile  No lab results found in last 7 days.    5. RADIOLOGY:   Recent Results (from the past 24 hour(s))   XR Chest Port 1 View    Narrative    EXAM: XR CHEST PORT 1 VIEW  LOCATION: [facility]  DATE/TIME: 1/19/2022 5:04 AM    INDICATION: CT  placement, pleural effusion monitoring  COMPARISON: Yesterday.      Impression    IMPRESSION: Endotracheal tube in satisfactory position terminating about 6.5 cm above the ron. Enteric tube passes below the diaphragm and the inferior margin of the radiograph. Right internal jugular venous catheter tip in the mid to lower SVC. Left   pleural drainage catheter remains in position. Moderate to large left pleural effusion appears to have decreased in size from yesterday. No pneumothorax visible. Stable alveolar infiltrates throughout the right lung. Stable cardiac silhouette.       =========================================

## 2022-01-19 NOTE — PROCEDURES
Procedure:   Bronchoscopy        Indication:   Patient self extubated and upon re-intubation was noted to have significant volume of bilious fluid in oropharynx, stat bronch performed for clearance of airways      Consent:   Obtained from the patient/family.       Pre-medication:   Versed 4 mg, Lidocain 1% 3 cc.        Procedure Summary:   Time out was performed. Heparin gtt had previously been held.   The scope inserted thru the ETT. Upper airway structures, vocal cords (anatomy and function) appear to be normal. Exam of trachea and bronchus of the right and left bronchial tree to the sub-segmental level revealed no endobronchial lesion. Moderated amount of pink, thick/viscous fluid was noted amount but no bilious fluid noted. Airways were aggressively suctioned clear. No purulent material noted so no BAL was performed.  The patient tolerated the procedure well without undue discomfort, hypotension or arrhythmia. The procedure was performed in the ICU--and vital sign parameters were monitored.        Complications:   No immediate complications.      This procedure is performed by Nicolas Foster DO

## 2022-01-20 NOTE — PROGRESS NOTES
Chest Tube has had minimal drainage and appear occluded, instilled 10 mg alteplase and 5 mg Dornase x1 this afternoon at 1450 with goal to re-establish patency. Plan to unclamp at 1650 following repositioning per protocol.

## 2022-01-20 NOTE — PROGRESS NOTES
[facility] hospital ICU RESPIRATORY NOTE        Date of Admission: 12/23/2021    Date of Intubation (most recent): 1/8/22 (re-intubated 1/19/22 after self-extubation.)    Reason for Mechanical Ventilation: Respiratory failue    Number of Days on Mechanical Ventilation: 13    Met Criteria for Spontaneous Breathing Trial: No    Reason for No Spontaneous Breathing Trial: PEEP>8    Significant Events Today: FiO2 increased to 100%, PEEP increased from 12 to 16 due to increased O2 demands.    ABG Results:   Recent Labs   Lab 01/20/22  0404 01/19/22  1239 01/17/22  0625 01/16/22  1147 01/14/22  1514   PH  --  7.37 7.40 7.39 7.35   PCO2  --  70* 60* 60* 64*   PO2  --  73* 70* 81 139*   HCO3  --  40* 37* 37* 35*   O2PER 100 90 75 60 70       Current Vent Settings: Ventilation Mode: CMV/AC  (Continuous Mandatory Ventilation/ Assist Control)  FiO2 (%): 80 %  Rate Set (breaths/minute): 28 breaths/min  Tidal Volume Set (mL): 410 mL  PEEP (cm H2O): 12 cmH2O  Oxygen Concentration (%): 80 %  Resp: 28      Skin Assessment: Intact    Plan: Continue to monitor and assess respiratory status while in ICU.  Continue to maintain ETT patency.  Continue to wean from mechanical ventilation and oxygen as tolerated per protocol.  Assess skin integrity at least once per shift.  Continue to deliver respiratory medications as ordered per protocol.      Jaek Murillo, RT

## 2022-01-20 NOTE — PROGRESS NOTES
[facility]    Infectious Disease Progress Note    Date of Service : 01/20/2022     Assessment:  1. Acute hypoxic respiratory failure related to hemothorax. S/p bronchoscopy. BAL cxs only show candida albicans , nasal MRSA PCR positive. S/p chest tube placement, exudative hemorrhagic fluid, pleural fluid and blood cxs negative thus far  2. 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 TTE without evidence of endocarditis, blood cxs cleared rapidly .  4. Perineal wounds, candida intertrigo, funguria related to cathetrization  5. JULIANNE, transaminitis related to sepsis improved  6. 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 Has bilateral renal calculi , urology has evaluated and recommended stone management at a later date.  7. S.epidermidis bacteremia likely due to culture contamination. Polymicrobial bacteremia seems unlikely. Pump site does not appear infected, there is no overlying erythema and CT abdomen done twice did not show stranding or fluid collection around pump site.   8. Atrial fibrillation, on amiodarone and in sinus rhythm  9. Densities along the pelvic sidewall, unclear whether chronic fluid collection/seroma. or adenopathy. In view of sepsis, wound recommend re evaluation with imaging .  10. Cholelithiasis  11. Chronic medical conditions - DM, HTN, hyperlipidemia, morbidly obese body habitus.   12. Repeat CT : Stable large left and small right pleural effusion with a  left-sided chest tube in place. The effusion on the left has  hyperdense components, either due to hemorrhagic or tenacious  material    Recommendations  1. On  Zosyn, no possible aspiration so continue, No apparent empyema with pleural  fluid cxs being negative, hemothorax   2. Treated for E.fecalis sepsis    Greer Corea MD    Interval History   Remains sedated and intubated  Lying in the bed, incontinent of stool, rectal tube in place   Discussed with nursing bedside     Physical Exam   Temp: 99.6  F (37.6  C) Temp src: Axillary   Pulse: 87   Resp: 13 SpO2: 97 % O2 Device: Mechanical Ventilator    Vitals:    01/18/22 0500 01/19/22 0411 01/20/22 0000   Weight: 128.4 kg (283 lb 1.1 oz) 127.1 kg (280 lb 3.3 oz) 123.6 kg (272 lb 7.8 oz)     Vital Signs with Ranges  Temp:  [99.6  F (37.6  C)-100  F (37.8  C)] 99.6  F (37.6  C)  Pulse:  [] 87  Resp:  [13-30] 13  MAP:  [58 mmHg-125 mmHg] 68 mmHg  Arterial Line BP: ()/() 98/47  FiO2 (%):  [80 %-100 %] 85 %  SpO2:  [83 %-100 %] 97 %       Constitutional: intubated, sedated  Lungs:chest tube in place left sided  Cardiovascular: S1S2  Skin: chronic stasis changes bilateral lower extremities, perineal erythema, intertrigo, excoriated lesions  MS : BL LE lymphedema    Other:    Medications     baclofen (LIORESAL) intraTHECAL Internal Pump       dexmedetomidine 0.6 mcg/kg/hr (01/20/22 0800)     dextrose       epoprostenol (VELETRI) 20 mcg/mL in sterile water inhalation solution 20 ng/kg/min (01/20/22 0729)     heparin 2,700 Units/hr (01/20/22 0729)     NaCl 20 mL/hr at 01/18/22 1944       acetylcysteine  1 mL Nebulization Q6H     albuterol  2.5 mg Nebulization Q6H     amiodarone  400 mg Oral or Feeding Tube Daily     aspirin  81 mg Oral or Feeding Tube Daily     chlorhexidine  15 mL Mouth/Throat Q12H     [Held by provider] DULoxetine  60 mg Oral BID     fiber modular (NUTRISOURCE FIBER)  2 packet Per Feeding Tube TID     influenza recomb quadrivalent PF  0.5 mL Intramuscular Prior to discharge     insulin aspart  1-12 Units Subcutaneous Q4H     lactobacillus rhamnosus (GG)  1 capsule Oral BID     lidocaine (PF)  5 mL Tracheal Tube Once     [Held by provider] metoprolol tartrate  25 mg Oral or  Feeding Tube BID     miconazole   Topical BID     multivitamins w/minerals  15 mL Per Feeding Tube Daily     nystatin   Topical BID     pantoprazole  40 mg Per Feeding Tube QAM AC    Or     pantoprazole (PROTONIX) IV  40 mg Intravenous QAM AC     piperacillin-tazobactam  4.5 g Intravenous Q6H     [Held by provider] polyethylene glycol  17 g Oral or Feeding Tube Daily     protein modular  2 packet Per Feeding Tube TID     [Held by provider] sennosides  5 mL Oral or Feeding Tube BID     sodium chloride (PF)  3 mL Intracatheter Q8H       Data   All microbiology laboratory data reviewed.  Recent Labs   Lab Test 01/20/22  0404 01/19/22 2007 01/19/22  1237 01/19/22  0429 01/18/22  1229 01/18/22 0442   WBC 25.1*  --   --  17.3*  --  16.5*   HGB 7.5*  7.5* 6.9* 7.5* 7.1*   < > 7.4*  7.4*   HCT 26.1*  --   --  24.5*  --  25.7*   MCV 92  --   --  92  --  91     --   --  333  --  332    < > = values in this interval not displayed.     Recent Labs   Lab Test 01/20/22 0404 01/19/22 0428 01/18/22 0442   CR 0.69 0.65 0.71     Recent Labs   Lab Test 12/23/21  2121   SED 14     Imaging  1/14 CXR   EXAM: XR CHEST PORT 1 VIEW  LOCATION: [facility]  DATE/TIME: 1/14/2022 5:47 AM     INDICATION: interval montoring of left hemothorax  COMPARISON: 01/13/2022                                                                      IMPRESSION: Endotracheal tube, enteric tubes, right IJ line and left chest tube. Bilateral infiltrates. Moderate to large left effusion similar    1/9 CT abdomen/pelvis  EXAM: CT CHEST/ABDOMEN/PELVIS WITH CONTRAST  LOCATION: [facility]  DATE/TIME: 01/09/2022, 3:06 PM     INDICATION: Sepsis.  COMPARISON: 12/23/2021.  TECHNIQUE: CT scan of the chest, abdomen, and pelvis was performed following injection of IV contrast. Multiplanar reformats were obtained. Dose reduction techniques were used.   CONTRAST: 124 mL Isovue-370.      FINDINGS:   LUNGS AND  PLEURA: Complete atelectasis of the left lung. There is a large pleural effusion with evidence of multiple internal loculations. There is high-density within the pleural fluid in the left hemithorax suggesting pockets of hemorrhage. A small   right pleural effusion is present. There is interlobular septal thickening in the right lung and areas of groundglass opacity.     MEDIASTINUM/AXILLAE: Right internal jugular central line terminates in the SVC. An endotracheal tube is appropriately positioned. A feeding tube extends into the distal duodenum. A nasogastric tube terminates in the stomach. The thyroid gland is   unremarkable. No pathologically enlarged thoracic or axillary lymph nodes. Caliber of the thoracic aorta is within normal limits.     CORONARY ARTERY CALCIFICATION: Mild.     HEPATOBILIARY: Gallstones are present in the gallbladder. No worrisome liver lesions.     PANCREAS: Normal.     SPLEEN: Normal.     ADRENAL GLANDS: Normal.     KIDNEYS/BLADDER: Nonobstructing 7 mm calculus in the right renal collecting system. A few tiny calcifications in the left kidney, decreased from prior. No hydronephrosis or worrisome renal mass. Urinary bladder decompressed by a Bobo catheter.     BOWEL: No bowel obstruction or inflammatory change. Normal appendix. No free air or intra-abdominal abscess. No ascites.     LYMPH NODES: Normal.     VASCULATURE: Nonaneurysmal aortic atherosclerosis.     PELVIC ORGANS: Fat-containing lesion in the left pelvis measuring 1.8 cm (series 3, image 262) likely a small dermoid.     MUSCULOSKELETAL: Chronic right femoral neck fracture noted. Lumbosacral fusion hardware noted. No destructive bone lesions.                                                                      IMPRESSION:  1.  There is a large, multiloculated, left pleural effusion with areas of high-density suggesting hemorrhagic contents. The possibility of empyema is not excluded.  2.  Complete atelectasis of the left  lung.  3.  Small right pleural effusion associated with groundglass opacities and interlobular septal thickening in the right lung could be related to infection or pulmonary edema.  4.  Cholelithiasis.  5.  Nonobstructing nephrolithiasis.

## 2022-01-20 NOTE — PLAN OF CARE
Neuro: Follows commands inconsistently, PERRLA, opens eyes to voice and spontaneously. Dex infusing.     CV/Tele: SR/ST at times. Heparin therapeutic. Hgb low earlier in shift. x1u PRBC given.     Resp: Resp needs increased during shift, vent settings changed to peep 16, and  Fio2 100%.    GI/: Bobo patent, continuing to be diuresed. Rectal tube in place, w/ minimal output. TF at goal.     Skin: scattered bruising to upper extremities. Chest tube drain/site CDI. Minimal output to into CT canister.

## 2022-01-20 NOTE — PROGRESS NOTES
[facility] ICU RESPIRATORY NOTE        Date of Admission: 12/23/2021    Date of Intubation (most recent): 1/8/22 (re-intubated 1/19/22 after self-extubation.)    Reason for Mechanical Ventilation: Resp Failure    Number of Days on Mechanical Ventilation: 13    Met Criteria for Spontaneous Breathing Trial: No    Reason for No Spontaneous Breathing Trial: PEEP 12, FiO2 75%, full strength Veletri    Significant Events Today: Decrease PEEP from 16 to 12    ABG Results:   Recent Labs   Lab 01/20/22  0618 01/20/22  0404 01/19/22  1239 01/17/22  0625 01/16/22  1147   PH 7.37  --  7.37 7.40 7.39   PCO2 71*  --  70* 60* 60*   PO2 93  --  73* 70* 81   HCO3 41*  --  40* 37* 37*   O2PER 100 100 90 75 60       Current Vent Settings: Ventilation Mode: CMV/AC  (Continuous Mandatory Ventilation/ Assist Control)  FiO2 (%): (S) 75 %  Rate Set (breaths/minute): 28 breaths/min  Tidal Volume Set (mL): 410 mL  PEEP (cm H2O): 12 cmH2O  Oxygen Concentration (%): 75 %  Resp: 28      Skin Assessment: Clean, dry, intact    Plan: Continue to wean oxygen as able.    Iza Sampson, RT

## 2022-01-20 NOTE — PROVIDER NOTIFICATION
Asked Dr Bauman to come by bedside. Pt's chest tube leaking around. Alteplace was used, clamped for 2 hrs, unclamped now, about 98 ml drained, dark red. Will check hgb at 8pm tonight

## 2022-01-20 NOTE — PROGRESS NOTES
CLINICAL NUTRITION SERVICES - REASSESSMENT NOTE      Future/Additional Recommendations:   Continue with current EN orders  Continue Nutrisource Fiber  Monitor stool patterns. Consider trial of banatrol vs imodium if large volume stools persist.    Malnutrition: (Updated 1/13) - remains current   % Weight Loss:  None noted  % Intake:  No decreased intake noted  Subcutaneous Fat Loss:  Orbital region moderate depletion  Muscle Loss:  Temporal region mild-moderate depletion  Fluid Retention:  Moderate 3+ per flowsheets      Malnutrition Diagnosis: Moderate malnutrition  In Context of:  Acute illness or injury       EVALUATION OF PROGRESS TOWARD GOALS   Diet:  NPO (vented)    Nutrition Support:  TF rate slightly increased from 20 ml/hr --> 25 ml/hr on 1/17. Pt continues on goal TF regimen as follows ~      Nutrition Support Enteral:  Type of Feeding Tube: NDT (tip at duodenal/jejunal junction) - 1/3  Enteral Frequency:  Continuous  Enteral Regimen: Novasource Renal at goal 25 ml/hr   Total Enteral Provisions: 1200 kcals, 55 gm pro, 430 mL H20, 110 gm CHO, no fiber   Free Water Flush: 50 ml q 1 hour (per MD order on 1/16)    + 2 packets Prosource TID = 240 kcal, 66 g protein   + Nutrisource Fiber 2 packets TID = 90 kcal and 18 g fiber  TOTAL (TF + Prosource + Nutrisource Fiber) = 1530 kcal (13 kcal/kg), 121 g  Protein (1.8 g/kg) and 18 g fiber     Intake/Tolerance:   - K+ 3.1 (L) - replacing, Mg 1.7 (WNL), Phos 2.9 (WNL)  - -175 --> high sliding scale insulin for correction as needed   - Rectal tube: 600 ml yesterday, 780 ml on 1/18 and 1200 ml on 1/17 --> Diarrhea appears to be improving over the past several days.   - I/O 3632/4140 (1/19), wt 123.6 kg (1/20) --> Overall down 6 kg from admission over the past month (~5% wt loss). Suspect confounded with fluid shifts as pt has intermittently been diuresing on lasix.       ASSESSED NUTRITION NEEDS:  Dosing Weight:  121 kg (energy - 1/7/21 weight);  66 kg (protein -  IBW)  Estimated Energy Needs:  4399-4579 kcals (11-14 Kcal/Kg)  Justification: obese  Estimated Protein Needs: 112-132 grams protein (1.7-2 g pro/Kg)  Justification: wound healing, hypercatabolism with acute illness, obesity guidelines  and CKD      NEW FINDINGS:   - Meds: culturell BID  - GOC: Plan for care conference tomorrow per MD in rounds   - Resp: Noted pt self-extubated yesterday and needed emergent re-intubation. Remains on full vent support.     Previous Goals:   TF + Prosource + Nutrisource Fiber will meet % estimated needs - Met  Stool pattern will improve (< 3 liquids stools and < 500 mL stool volume per day) - Not met, though improving     Previous Nutrition Diagnosis:   Altered GI function related to unknown etiology as evidenced by frequent liquid stools 6-7 per day with volume 450-1400 mL per day.  Evaluation: Improving, but ongoing       CURRENT NUTRITION DIAGNOSIS  Altered GI function related to unknown etiology, possibly in setting of abx use with Zosyn as evidenced by 600-1200 mL stool daily over the past 3 days     INTERVENTIONS  Recommendations / Nutrition Prescription  Continue with current EN orders  Continue Nutrisource Fiber  Monitor stool patterns. Consider trial of banatrol vs imodium if large volume stools persist.     Implementation  Collaboration and Referral of Nutrition care - pt discussed this morning during interdisciplinary rounds     Goals  TF + Prosource + Nutrisource Fiber will meet % estimated needs   Stool pattern will improve (< 3 liquids stools and < 500 mL stool volume per day)       MONITORING AND EVALUATION:  Progress towards goals will be monitored and evaluated per protocol and Practice Guidelines      eHnrietta Garcia, RD, LD

## 2022-01-20 NOTE — PROGRESS NOTES
Vent setting back to where it was pre-extubation, fIO2 80%, peep 12. Following commands, dex gtt to keep RASS -1. Hemodynamically stable. Tube feed at goal with flush, diuresing with lasix, AUO via ramos. Rectal tube draining. Skin unchanged.  by bedside, supportive. Will cont to closely monitor

## 2022-01-20 NOTE — PROGRESS NOTES
[facility] ICU PROGRESS NOTE  01/20/2022        Date of Service (when I saw the patient): 01/20/2022    ASSESSMENT:  Amanda Richardson is a 56 year old female with history of MS with baclofen pump in place, neurogenic bladder, recurrent UTIs, chronic pain syndrome, DM II, hypertension, hyperlipidemia, CKD, Non-hodgkin's lymphoma, Obesity who was admitted on 12/23/21 after presenting to ED with altered mental status. Patient was intubated, treated for septic shock due to UTI, blood Cx (+) E fecalis. She was extubated 12/30/21, transferred to floor 12/31/21.  S/p  left therapeutic thoracentesis (500 ml) on 1/5/22 for hypoxia . RRT 1/8/22 for severe hypoxia prompting intubation and ICU transfer. Septic shock, ARDS and hypoxic respiratory failure.     Currently Retained Hemothorax is driving ongoin o2 requirement and pt would benefit from VATS, continuing to diurese to decrease vent settings to be more appropriate for VATS. Ongoing discussions with Dr. Gonzalez regarding optimal timing.    CHANGES and MAJOR THINGS TODAY:   -Overnight desaturated with cares, PEEP increased to 16 and Velitri started at full dose, since then has weaned to PEEP 12 and 75% fio2, velitri continues  -continue lasix again today, 80 mg in AM and plan for additional 80 mg in PM with 5 mg metolazone  ---Thoracic surgery - plans for VATS when vent optimized, however given trajectory unlikely to achieve vent settings appropriate for VATS  ---Today instilled alteplase/dornase into chest tube in attempt to re-establish patency but will not continue with 72 hour dosing due to concerns about re-bleeding from thoracic surgery  ---Care conference scheduled with family tomorrow afternoon to further discuss goals of care as she unfortunately appears to have a pulmonary status that is unlikely recoverable.  -Continuing precedex for now, will plan to switch to propofol if vent dyssynchrony present  -increase heparin to high intensity protocol in setting of  DVT given high risk of decompensation if PE develops  -OOB to chair to optimize respiratory effort    PLAN:    Neuro/ pain/ sedation:  # MS, s/p baclofen pump  # Acute metabolic encephalopathy  # Depression   # Sedation and anagelsia for ventilator compliance  - Pain: oxycodone PRN, Fentanyl IV PRN.    - baclofen pump implanted (f/b Dr Griffin, [place], out-patient)   - RASS goal 0 to -1   - Sedation:  precedex gtt   - off Versed 1/11/22  - Encephalopathy documented in prior hospitalist notes prior to intubation  - Cymbalta on hold given inability to crush while intubated      Pulmonary care:   # Acute hypoxemic hypercarbic respiratory failure, intubated 1/8  # HCAP  # Left pleural effusion, s/p thoracentesis 1/5  # Mucous plugging, s/p bronchoscopy 1/8 and 1/9   # Left hemothorax s/p CT 1/9/22   Ventilation Mode: CMV/AC  (Continuous Mandatory Ventilation/ Assist Control)  FiO2 (%): (S) 75 %  Rate Set (breaths/minute): 28 breaths/min  Tidal Volume Set (mL): 410 mL  PEEP (cm H2O): 12 cmH2O  Oxygen Concentration (%): 75 %  Resp: 28     - Several bronchs for mucous plugging. Continue mucolytic therapy with Mucomyst and Albuterol and CPT. (can change 3% Nebs to PRN as Mucomyst ordered)  - Left CT appear occluded, will attempt dornase to re-establish patency  - CT of chest 1/9/22 with complete atelectasis of left lung, multiple loculations               - unclear when hemothorax occured, question if from prior thoracentesis on 1/5/22  - thoracic consulted for left hemothorax scant OP from chest tube, heparin has been restarted without evidence of bleeding.   - Strip CT Q shift  1/17 -  CT chest showing retained hemothorax of large volume with compressive atelectasis, right lung with continued signs of pulmonary edema   -Aggressive diuresis and plan for VATS and washout of left chest when vent optimized, which again is appearing to be a goal unlikely to be achieved    Cardiovascular:    # Septic shock   # Paroxysmal atrial  fibrillation  # CHF  # Hyperlipidemia  - Monitor hemodynamic status. Goal MAP >65. Off pressors   - Continue PO Amiodarone. Lytes replaced.  K>4.0,Mag >2.5  - Continue 81 mg Aspirin  - 1/1 ECHO Normal LV function and seize. EF 60-65%. RV mildly dilated.   - 1/9: normal EF 65-70. left ventricle normal. Left ventricular systolic function is normal. No regional wall motion abnormalities noted. right ventricle is normal in size and function.  - solucortef started 1/9/22--> on taper       GI:   # Severe protein calorie malnutrition  # Cholelithiasis   # Diarrhea, s/p rectal tube   - PPI  - Continue TF at goal (20 ml/hr) - RD following, appreciate recs      Renal/ Fluid Balance:    # JULIANNE, improving   # hypervolemia  # hypokalemia  # Hypernatremia, likely 2/2 Zosyn   - Creatinine baseline 0.64, peaked at 2.03, stable near baseline 0.73 (baseline 0.8-1.03 per epic)    - 1/9/22:  FeNA ~ 0.1%  - 1/3 CT A/P with non obstructing renal stones.   - 1/9/21:  US renal: No hydronephrosis, non-obstructing stones.                - seen by urology 1/5, no acute intervention as stone non-obstructing and no evidence of hydronephrosis of US, follow up as OP for definite stone removal with Dr Sapp.   - renal following, appreciate cares and recommendations for this patient   - K replacement protocol, give additional this AM and PM with diuresis   - Hypernatremia now resolved, FW to patency  - lasix 80mg q12 x2 today, 5 mg metolazone given in PM  - BMP repeat in AM      Endocrine:    # DM II  # Relative adrenal insufficiency   - holding PTA oral agents   - Sliding scale for diabetes management. Goal to keep BG <180.   - High intensity insulin        ID/ Antibiotics:  # HCAP  # Enterococcus faecalis bacteremia 2/2 urinary source- treated - Completed E fecalis bacteremia treatment 1/7  - Antibiotics: Zosyn to continue for time being given aspiration event (1/11-present) plan for pro sonido on 1/21  - follow culture results: NGTD  - Appreciate ID  following, discussed with ID. No indication for anti-fungals at this time.     Culture:   - 1/8:  MRSA swab positive 1/8   - COVID-19 PCR negative.   - 1/9: BC x 2, UA with reflex  - 1/9: Cultures pending: BAL.   - 1/5: pleural fluid culture negative.      Heme:   # RIJ DVT (1/5)   # LIJ DVT  # superficial thrombus in cephalic vein from mid to proximal forearm  1/5  # Anemia of chronic illness  # Anemia due to blood loss   - Vascular surgery consulted 1/5/22. Plan for treatment of heparin gtt, plan for 12 weeks total for DVT's.  **unable to remove right internal jugular, no suitable veins for placement of PIV's (see  VAD RN's note, although ideal to remove line and treat, will need to keep central line and treat through this given clots in RUE and left internal jugular clots.   -Heparin Gtt increased to high intensity on 1/19, monitor for clinical bleeding      MSK:  # MS  # Acute severe weakness and deconditioning d/t critical illness   - Physical Therapy/OT following, appreciate recs, OOB to chair daily  - per family: BL W/C bound but able to self-transfer, dress independently, feeds herself independently      Lines/ tubes/ drains:  - RIJ TLC, L axillary arterial line, ETT, TONI ramos.      Disposition:  - [facility]     Family: Patient's spouse Fernando updated by phone today     Time spent on this Encounter   Billing:  I spent 60 minutes bedside and on the inpatient unit today managing the critical care of Amanda Richardson in relation to the issues listed in this note.      Nicolas Foster DO  [facility] Fellow  ====================================  INTERVAL HISTORY: Desaturation overnight an placed on velitri, since then has recovered well but velitri continues for now.     OBJECTIVE:   1. VITAL SIGNS:   Temp:  [99  F (37.2  C)-100  F (37.8  C)] 99  F (37.2  C)  Pulse:  [] 99  Resp:  [13-30] 28  MAP:  [58 mmHg-125 mmHg] 83 mmHg  Arterial Line BP: ()/() 116/60  FiO2 (%):  [75 %-100 %] 75 %  SpO2:  [83  %-100 %] 93 %  Ventilation Mode: CMV/AC  (Continuous Mandatory Ventilation/ Assist Control)  FiO2 (%): (S) 75 %  Rate Set (breaths/minute): 28 breaths/min  Tidal Volume Set (mL): 410 mL  PEEP (cm H2O): 12 cmH2O  Oxygen Concentration (%): 75 %  Resp: 28    2. INTAKE/ OUTPUT:   I/O last 3 completed shifts:  In: 3267.87 [I.V.:1337.87; NG/GT:1080]  Out: 4537 [Urine:4425; Stool:100; Chest Tube:12]    3. PHYSICAL EXAMINATION:  General:  opens eyes to voice and spontaneous, appears comfortable on precedex gtt   HEENT/Neuro: pupils 3mm and reactive. ETT present and secured, following commands on UEs    Pulm/Resp: diminished on LUE more than yesterday, LLE remains diminished, R side clear with dim bases, CT present with scant dark sanguinous drainage, clots present when stripped tubing   CV: RRR, S1/S2  Abdomen: obese, abdomen soft and compressible, baclofen pump noted on palpitation   : (+) ramos, urine yellow   Incisions/Skin: fragile, edematous, healing ecchymosis present, chronic skin changes in BLE, groin folds reddened   MSK/Extremities: chronic lymphedema in BLE. R >L.  Generalized moderate to severe edema     4. INVESTIGATIONS:   Arterial Blood Gases   Recent Labs   Lab 01/20/22  0618 01/19/22  1239 01/17/22  0625 01/16/22  1147   PH 7.37 7.37 7.40 7.39   PCO2 71* 70* 60* 60*   PO2 93 73* 70* 81   HCO3 41* 40* 37* 37*     Complete Blood Count   Recent Labs   Lab 01/20/22  1133 01/20/22  0404 01/19/22 2007 01/19/22  1237 01/19/22  0429 01/18/22  1229 01/18/22  0442   WBC 26.7* 25.1*  --   --  17.3*  --  16.5*   HGB 7.3* 7.5*  7.5* 6.9* 7.5* 7.1*   < > 7.4*  7.4*    357  --   --  333  --  332    < > = values in this interval not displayed.     Basic Metabolic Panel  Recent Labs   Lab 01/20/22  1131 01/20/22  0854 01/20/22  0523 01/20/22  0404 01/19/22  0433 01/19/22  0428 01/18/22  1958 01/18/22  1737 01/18/22  0449 01/18/22  0442 01/17/22  0458 01/17/22  0453   NA  --   --   --  141  --  141  --   --   --   144  --  144   POTASSIUM  --   --   --  3.1*  --  3.7  --  3.4  --  2.8*  --  3.3*   CHLORIDE  --   --   --  99  --  102  --   --   --  102  --  105   CO2  --   --   --  37*  --  38*  --   --   --  37*  --  35*   BUN  --   --   --  29  --  33*  --   --   --  30  --  33*   CR  --   --   --  0.69  --  0.65  --   --   --  0.71  --  0.73   * 163* 162* 175*   < > 171*   < >  --    < > 183*   < > 187*    < > = values in this interval not displayed.     Liver Function Tests  No lab results found in last 7 days.  Pancreatic Enzymes  No lab results found in last 7 days.  Coagulation Profile  No lab results found in last 7 days.    5. RADIOLOGY:   Recent Results (from the past 24 hour(s))   XR Chest Port 1 View    Narrative    EXAM: XR CHEST PORT 1 VIEW  LOCATION: [facility]  DATE/TIME: 1/20/2022 4:10 AM    INDICATION: Hypoxia-eval etiology  COMPARISON: 01/19/2022.      Impression    IMPRESSION: Overall, appearance of the chest is similar to 01/19/2022. Endotracheal tube in satisfactory position terminating 5.1 cm above the ron. Enteric tube passes below the diaphragm and the inferior margin of the radiograph. Right internal   jugular venous catheter tip in the mid to lower SVC. Left chest tube in similar position to previous. Moderate left pleural effusion and atelectasis of the mid and lower left lung not changed. Persistent hazy alveolar infiltrates throughout the right   lung and aerated portion of the left upper lung. No pneumothorax.       =========================================

## 2022-01-21 NOTE — PROGRESS NOTES
Vented, fIO2 80%, peep 12. Desaturates with turns, but recuperates quickly. Following commands, dex gtt to keep RASS -1. Hemodynamically stable. TPA to unclamp chest tube. Tube feed at goal with flush, diuresing with lasix, AUO via ramos. Rectal tube draining. Skin unchanged.  by bedside, supportive. Will cont to closely monitor

## 2022-01-21 NOTE — PROGRESS NOTES
[facility]    Infectious Disease Progress Note    Date of Service : 01/21/2022     Assessment:  1. Acute hypoxic respiratory failure related to hemothorax. S/p bronchoscopy. BAL cxs only show candida albicans , nasal MRSA PCR positive. S/p chest tube placement, exudative hemorrhagic fluid, pleural fluid and blood cxs negative thus far  2. 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 TTE without evidence of endocarditis, blood cxs cleared rapidly .  4. Perineal wounds, candida intertrigo, funguria related to cathetrization  5. JULIANNE, transaminitis related to sepsis improved  6. 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 Has bilateral renal calculi , urology has evaluated and recommended stone management at a later date.  7. S.epidermidis bacteremia likely due to culture contamination. Polymicrobial bacteremia seems unlikely. Pump site does not appear infected, there is no overlying erythema and CT abdomen done twice did not show stranding or fluid collection around pump site.   8. Atrial fibrillation, on amiodarone and in sinus rhythm  9. Densities along the pelvic sidewall, unclear whether chronic fluid collection/seroma. or adenopathy. In view of sepsis, wound recommend re evaluation with imaging .  10. Cholelithiasis  11. Chronic medical conditions - DM, HTN, hyperlipidemia, morbidly obese body habitus.   12. Repeat CT : Stable large left and small right pleural effusion with a  left-sided chest tube in place. The effusion on the left has  hyperdense components, either due to hemorrhagic or tenacious  material    Recommendations  1. On  Zosyn, for  possible aspiration so continue, No apparent empyema with pleural  fluid cxs being negative, hemothorax       Greer Corea MD    Interval History   Remains sedated and intubated  Lying in the bed, incontinent of stool, rectal tube in place   Discussed with nursing bedside     Physical Exam   Temp: 99.1  F (37.3  C) Temp src: Oral BP: 117/65 Pulse: 110   Resp: (!) 35 SpO2: 92 % O2 Device: Mechanical Ventilator    Vitals:    01/19/22 0411 01/20/22 0000 01/21/22 0015   Weight: 127.1 kg (280 lb 3.3 oz) 123.6 kg (272 lb 7.8 oz) 124.3 kg (274 lb 0.5 oz)     Vital Signs with Ranges  Temp:  [99  F (37.2  C)-99.1  F (37.3  C)] 99.1  F (37.3  C)  Pulse:  [] 110  Resp:  [25-37] 35  BP: (117)/(65) 117/65  MAP:  [52 mmHg-113 mmHg] 95 mmHg  Arterial Line BP: ()/(38-87) 129/70  FiO2 (%):  [75 %-80 %] 80 %  SpO2:  [88 %-98 %] 92 %       Constitutional: intubated, sedated  Lungs:chest tube in place left sided  Cardiovascular: S1S2  Skin: chronic stasis changes bilateral lower extremities, perineal erythema, intertrigo, excoriated lesions  MS : BL LE lymphedema    Other:    Medications     baclofen (LIORESAL) intraTHECAL Internal Pump       dexmedetomidine 0.7 mcg/kg/hr (01/21/22 0756)     dextrose       epoprostenol (VELETRI) 20 mcg/mL in sterile water inhalation solution 20 ng/kg/min (01/21/22 0515)     heparin 2,850 Units/hr (01/21/22 0755)     norepinephrine       NaCl 20 mL/hr at 01/18/22 1944       acetylcysteine  1 mL Nebulization Q6H     albuterol  2.5 mg Nebulization Q6H     amiodarone  400 mg Oral or Feeding Tube Daily     aspirin  81 mg Oral or Feeding Tube Daily     chlorhexidine  15 mL Mouth/Throat Q12H     [Held by provider] DULoxetine  60 mg Oral BID     fiber modular (NUTRISOURCE FIBER)  2 packet Per Feeding Tube TID     insulin aspart  1-12 Units Subcutaneous Q4H     lactobacillus rhamnosus (GG)  1 capsule Oral BID     lidocaine (PF)  5 mL Tracheal Tube Once     [Held by provider] metoprolol tartrate  25 mg Oral or Feeding Tube BID     miconazole   Topical BID      multivitamins w/minerals  15 mL Per Feeding Tube Daily     norepinephrine         nystatin   Topical BID     pantoprazole  40 mg Per Feeding Tube QAM AC    Or     pantoprazole (PROTONIX) IV  40 mg Intravenous QAM AC     piperacillin-tazobactam  4.5 g Intravenous Q6H     [Held by provider] polyethylene glycol  17 g Oral or Feeding Tube Daily     protein modular  2 packet Per Feeding Tube TID     [Held by provider] sennosides  5 mL Oral or Feeding Tube BID     sodium chloride (PF)  3 mL Intracatheter Q8H       Data   All microbiology laboratory data reviewed.  Recent Labs   Lab Test 01/21/22  1000 01/21/22  0437 01/20/22 2028 01/20/22  1133 01/20/22  0404   WBC  --  27.3*  --  26.7* 25.1*   HGB 7.9* 6.9*  6.9* 7.4* 7.3* 7.5*  7.5*   HCT  --  24.0*  --  25.0* 26.1*   MCV  --  92  --  93 92   PLT  --  347  --  356 357     Recent Labs   Lab Test 01/21/22  0437 01/20/22  0404 01/19/22  0428   CR 0.78 0.69 0.65     Recent Labs   Lab Test 12/23/21  2121   SED 14     Imaging  1/14 CXR   EXAM: XR CHEST PORT 1 VIEW  LOCATION: [facility]  DATE/TIME: 1/14/2022 5:47 AM     INDICATION: interval montoring of left hemothorax  COMPARISON: 01/13/2022                                                                      IMPRESSION: Endotracheal tube, enteric tubes, right IJ line and left chest tube. Bilateral infiltrates. Moderate to large left effusion similar    1/9 CT abdomen/pelvis  EXAM: CT CHEST/ABDOMEN/PELVIS WITH CONTRAST  LOCATION: [facility]  DATE/TIME: 01/09/2022, 3:06 PM     INDICATION: Sepsis.  COMPARISON: 12/23/2021.  TECHNIQUE: CT scan of the chest, abdomen, and pelvis was performed following injection of IV contrast. Multiplanar reformats were obtained. Dose reduction techniques were used.   CONTRAST: 124 mL Isovue-370.      FINDINGS:   LUNGS AND PLEURA: Complete atelectasis of the left lung. There is a large pleural effusion with evidence of multiple internal  loculations. There is high-density within the pleural fluid in the left hemithorax suggesting pockets of hemorrhage. A small   right pleural effusion is present. There is interlobular septal thickening in the right lung and areas of groundglass opacity.     MEDIASTINUM/AXILLAE: Right internal jugular central line terminates in the SVC. An endotracheal tube is appropriately positioned. A feeding tube extends into the distal duodenum. A nasogastric tube terminates in the stomach. The thyroid gland is   unremarkable. No pathologically enlarged thoracic or axillary lymph nodes. Caliber of the thoracic aorta is within normal limits.     CORONARY ARTERY CALCIFICATION: Mild.     HEPATOBILIARY: Gallstones are present in the gallbladder. No worrisome liver lesions.     PANCREAS: Normal.     SPLEEN: Normal.     ADRENAL GLANDS: Normal.     KIDNEYS/BLADDER: Nonobstructing 7 mm calculus in the right renal collecting system. A few tiny calcifications in the left kidney, decreased from prior. No hydronephrosis or worrisome renal mass. Urinary bladder decompressed by a Bobo catheter.     BOWEL: No bowel obstruction or inflammatory change. Normal appendix. No free air or intra-abdominal abscess. No ascites.     LYMPH NODES: Normal.     VASCULATURE: Nonaneurysmal aortic atherosclerosis.     PELVIC ORGANS: Fat-containing lesion in the left pelvis measuring 1.8 cm (series 3, image 262) likely a small dermoid.     MUSCULOSKELETAL: Chronic right femoral neck fracture noted. Lumbosacral fusion hardware noted. No destructive bone lesions.                                                                      IMPRESSION:  1.  There is a large, multiloculated, left pleural effusion with areas of high-density suggesting hemorrhagic contents. The possibility of empyema is not excluded.  2.  Complete atelectasis of the left lung.  3.  Small right pleural effusion associated with groundglass opacities and interlobular septal thickening in the  right lung could be related to infection or pulmonary edema.  4.  Cholelithiasis.  5.  Nonobstructing nephrolithiasis.

## 2022-01-21 NOTE — CONSULTS
Palliative consult received for goals of care. Case reviewed extensively with Dr. Foster. Due to high palliative consult volume/census/acuity, in addition to limited staffing, pt cannot be evaluated by our team today. Dr. Foster aware. Pt will be prioritized for early next week, Monday 1/24 or Tuesday 1/25, pending our triage procedure. Thanks.     BAYLEE Bai [facility]  Contact information available via [facility] Paging/Directory

## 2022-01-21 NOTE — PROGRESS NOTES
[facility] RESPIRATORY NOTE           Date of Admission: 12/23/2021     Date of Intubation (most recent): 1/8/22 (re-intubated 1/19/22 after self-extubation.)     Reason for Mechanical Ventilation: Resp Failure     Number of Days on Mechanical Ventilation: 14     Met Criteria for Spontaneous Breathing Trial: No     Reason for No Spontaneous Breathing Trial: PEEP 12, FiO2 75%, full strength Veletri     Significant Events Today: NONE.  Recent Labs   Lab 01/20/22  0618 01/20/22  0404 01/19/22  1239 01/17/22  0625 01/16/22  1147   PH 7.37  --  7.37 7.40 7.39   PCO2 71*  --  70* 60* 60*   PO2 93  --  73* 70* 81   HCO3 41*  --  40* 37* 37*   O2PER 100 100 90 75 60   Ventilation Mode: CMV/AC  (Continuous Mandatory Ventilation/ Assist Control)  FiO2 (%): 80 %  Rate Set (breaths/minute): 28 breaths/min  Tidal Volume Set (mL): 410 mL  PEEP (cm H2O): 12 cmH2O  Oxygen Concentration (%): 80 %  Resp: 27

## 2022-01-21 NOTE — PROGRESS NOTES
Care Management Follow Up    Length of Stay (days): 28    Expected Discharge Date: 01/26/2022     Concerns to be Addressed: discharge planning     Patient plan of care discussed at interdisciplinary rounds: Yes    Anticipated Discharge Disposition: Transitional Care,Skilled Nursing Facilty     Anticipated Discharge Services: None  Anticipated Discharge DME: None    Patient/family educated on Medicare website which has current facility and service quality ratings: yes  Education Provided on the Discharge Plan:    Patient/Family in Agreement with the Plan: yes    Referrals Placed by CM/SW: Post Acute Facilities  Private pay costs discussed: Not applicable    Additional Information:  Care conference was held. Family on phone and spouse, Fernando present in CV conference room. Family were given update and in the next days, they will likely transition to comfort care. Visitor policy reviewed. Family offered ipad information and all declined.       Rosa Maria Valenzuela RN

## 2022-01-21 NOTE — PROGRESS NOTES
[facility] ICU PROGRESS NOTE  01/21/2022        Date of Service (when I saw the patient): 01/21/2022    ASSESSMENT:  Amanda Richardson is a 56 year old female with history of MS with baclofen pump in place, neurogenic bladder, recurrent UTIs, chronic pain syndrome, DM II, hypertension, hyperlipidemia, CKD, Non-hodgkin's lymphoma, Obesity who was admitted on 12/23/21 after presenting to ED with altered mental status. Patient was intubated, treated for septic shock due to UTI, blood Cx (+) E fecalis. She was extubated 12/30/21, transferred to floor 12/31/21.  S/p  left therapeutic thoracentesis (500 ml) on 1/5/22 for hypoxia . RRT 1/8/22 for severe hypoxia prompting intubation and ICU transfer. Septic shock, ARDS and hypoxic respiratory failure.     Currently Retained Hemothorax is driving ongoin o2 requirement and pt would benefit from VATS, continuing to diurese to decrease vent settings to be more appropriate for VATS. Ongoing discussions with Dr. Gonzalez regarding optimal timing.    CHANGES and MAJOR THINGS TODAY:   -No acute changes to respiratory status, remains on high support of PEEP 12, 80% fio2 and velitri  -following alteplase and dornase tube patency established but minimal drainage noted, retained hemothorax unlikely to drain at this point withou more aggressive intervention  -Care conference held amongst numerous family members including  Fernando, son Ramy and Daughter Vandana, full update was given and ultimately the family decided that ongoing aggressive cares without obvious path to recovery was not consistent with Amanda's personal goals of care, they decided to move forward with comfort measures once both children had a chance to visit over the weekend  -Will continue current cares over the weekend    PLAN:    Neuro/ pain/ sedation:  # MS, s/p baclofen pump  # Acute metabolic encephalopathy  # Depression   # Sedation and anagelsia for ventilator compliance  - Pain: oxycodone PRN, Fentanyl IV  PRN.    - baclofen pump implanted (f/b Dr Griffin, [place], out-patient)   - RASS goal 0 to -1   - Sedation:  precedex gtt   - off Versed 1/11/22  - Encephalopathy documented in prior hospitalist notes prior to intubation  - Cymbalta on hold given inability to crush while intubated      Pulmonary care:   # Acute hypoxemic hypercarbic respiratory failure, intubated 1/8  # HCAP  # Left pleural effusion, s/p thoracentesis 1/5  # Mucous plugging, s/p bronchoscopy 1/8 and 1/9   # Left hemothorax s/p CT 1/9/22   Ventilation Mode: CMV/AC  (Continuous Mandatory Ventilation/ Assist Control)  FiO2 (%): 80 %  Rate Set (breaths/minute): 28 breaths/min  Tidal Volume Set (mL): 410 mL  PEEP (cm H2O): 12 cmH2O  Oxygen Concentration (%): 80 %  Resp: (!) 40     - Several bronchs for mucous plugging. Continue mucolytic therapy with Mucomyst and Albuterol and CPT. (can change 3% Nebs to PRN as Mucomyst ordered)  - Left CT appear occluded, will attempt dornase to re-establish patency  - CT of chest 1/9/22 with complete atelectasis of left lung, multiple loculations               - unclear when hemothorax occured, question if from prior thoracentesis on 1/5/22  - thoracic consulted for left hemothorax scant OP from chest tube, heparin has been restarted without evidence of bleeding.   - Strip CT Q shift  1/17 -  CT chest showing retained hemothorax of large volume with compressive atelectasis, right lung with continued signs of pulmonary edema   -Continue supportive measures until comfort measures are ready to be initiated    Cardiovascular:    # Septic shock   # Paroxysmal atrial fibrillation  # CHF  # Hyperlipidemia  - Monitor hemodynamic status. Goal MAP >65. Off pressors   - Continue PO Amiodarone. Lytes replaced.  K>4.0,Mag >2.5  - Continue 81 mg Aspirin  - 1/1 ECHO Normal LV function and seize. EF 60-65%. RV mildly dilated.   - 1/9: normal EF 65-70. left ventricle normal. Left ventricular systolic function is normal. No regional wall  motion abnormalities noted. right ventricle is normal in size and function.  - solucortef started 1/9/22--> on taper    - low dose levophed, titrate as able     GI:   # Severe protein calorie malnutrition  # Cholelithiasis   # Diarrhea, s/p rectal tube   - PPI  - Continue TF at goal (20 ml/hr) - RD following, appreciate recs      Renal/ Fluid Balance:    # JULIANNE, improving   # hypervolemia  # hypokalemia  # Hypernatremia, likely 2/2 Zosyn   - Creatinine baseline 0.64, peaked at 2.03, stable near baseline 0.73 (baseline 0.8-1.03 per epic)    - 1/9/22:  FeNA ~ 0.1%  - 1/3 CT A/P with non obstructing renal stones.   - 1/9/21:  US renal: No hydronephrosis, non-obstructing stones.                - seen by urology 1/5, no acute intervention as stone non-obstructing and no evidence of hydronephrosis of US, follow up as OP for definite stone removal with Dr Sapp.   - renal following, appreciate cares and recommendations for this patient   - K replacement protocol, give additional this AM and PM with diuresis   - Hypernatremia now resolved, FW to patency  - holding diuresis given new onset levophed requirement  - BMP repeat in AM      Endocrine:    # DM II  # Relative adrenal insufficiency   - holding PTA oral agents   - Sliding scale for diabetes management. Goal to keep BG <180.   - High intensity insulin        ID/ Antibiotics:  # HCAP  # Enterococcus faecalis bacteremia 2/2 urinary source- treated - Completed E fecalis bacteremia treatment 1/7  - Antibiotics: Zosyn to continue for time being given aspiration event (1/11-present), continuing to avoid decompensation until family ready to move forward with comfort measures  - follow culture results: NGTD       Culture:   - 1/8:  MRSA swab positive 1/8   - COVID-19 PCR negative.   - 1/9: BC x 2, UA with reflex  - 1/9: Cultures pending: BAL.   - 1/5: pleural fluid culture negative.      Heme:   # RIJ DVT (1/5)   # LIJ DVT  # superficial thrombus in cephalic vein from mid to  proximal forearm  1/5  # Anemia of chronic illness  # Anemia due to blood loss   - Vascular surgery consulted 1/5/22. Plan for treatment of heparin gtt, plan for 12 weeks total for DVT's.  **unable to remove right internal jugular, no suitable veins for placement of PIV's (see  VAD RN's note, although ideal to remove line and treat, will need to keep central line and treat through this given clots in RUE and left internal jugular clots.   -Heparin Gtt high intensity on 1/19, monitor for clinical bleeding      MSK:  # MS  # Acute severe weakness and deconditioning d/t critical illness   - Physical Therapy/OT following, appreciate recs, OOB to chair daily  - per family: BL W/C bound but able to self-transfer, dress independently, feeds herself independently      Lines/ tubes/ drains:  - RIJ TLC, L axillary arterial line, ETT, [place], [place].      Disposition:  - [facility]     Family: Patient's spouse Fernando updated by phone today     Time spent on this Encounter   Billing:  I spent 60 minutes bedside and on the inpatient unit today managing the critical care of Amanda Richardson in relation to the issues listed in this note.      Nicolas Foster DO  [facility] Fellow  ====================================  INTERVAL HISTORY: Desaturation overnight an placed on velitri, since then has recovered well but velitri continues for now.     OBJECTIVE:   1. VITAL SIGNS:   Temp:  [99  F (37.2  C)-99.1  F (37.3  C)] 99.1  F (37.3  C)  Pulse:  [] 107  Resp:  [25-43] 40  BP: (117)/(65) 117/65  MAP:  [52 mmHg-113 mmHg] 94 mmHg  Arterial Line BP: ()/(38-87) 130/68  FiO2 (%):  [80 %] 80 %  SpO2:  [88 %-97 %] 93 %  Ventilation Mode: CMV/AC  (Continuous Mandatory Ventilation/ Assist Control)  FiO2 (%): 80 %  Rate Set (breaths/minute): 28 breaths/min  Tidal Volume Set (mL): 410 mL  PEEP (cm H2O): 12 cmH2O  Oxygen Concentration (%): 80 %  Resp: (!) 40    2. INTAKE/ OUTPUT:   I/O last 3 completed shifts:  In: 2707.46 [I.V.:1327.46;  NG/GT:780]  Out: 7268 [Urine:6650; Stool:400; Chest Tube:218]    3. PHYSICAL EXAMINATION:  General:  opens eyes to voice and spontaneous, appears comfortable on precedex gtt   HEENT/Neuro: pupils 3mm and reactive. ETT present and secured, following commands on UEs    Pulm/Resp: diminished on LUE more than yesterday, LLE remains diminished, R side clear with dim bases, CT present with scant dark sanguinous drainage, clots present when stripped tubing   CV: RRR, S1/S2  Abdomen: obese, abdomen soft and compressible, baclofen pump noted on palpitation   : (+) ramos, urine yellow   Incisions/Skin: fragile, edematous, healing ecchymosis present, chronic skin changes in BLE, groin folds reddened   MSK/Extremities: chronic lymphedema in BLE. R >L.  Generalized moderate to severe edema     4. INVESTIGATIONS:   Arterial Blood Gases   Recent Labs   Lab 01/20/22  0618 01/19/22  1239 01/17/22  0625 01/16/22  1147   PH 7.37 7.37 7.40 7.39   PCO2 71* 70* 60* 60*   PO2 93 73* 70* 81   HCO3 41* 40* 37* 37*     Complete Blood Count   Recent Labs   Lab 01/21/22  1000 01/21/22  0437 01/20/22 2028 01/20/22  1133 01/20/22  0404 01/19/22  1237 01/19/22  0429   WBC  --  27.3*  --  26.7* 25.1*  --  17.3*   HGB 7.9* 6.9*  6.9* 7.4* 7.3* 7.5*  7.5*   < > 7.1*   PLT  --  347  --  356 357  --  333    < > = values in this interval not displayed.     Basic Metabolic Panel  Recent Labs   Lab 01/21/22  1000 01/21/22  0819 01/21/22  0444 01/21/22  0437 01/21/22  0007 01/20/22  2034 01/20/22 2028 01/20/22  0523 01/20/22  0404 01/19/22  0433 01/19/22  0428 01/18/22  0449 01/18/22  0442   NA  --   --   --  142  --   --   --   --  141  --  141  --  144   POTASSIUM 3.2*  --   --  3.2*  --   --  3.2*  --  3.1*  --  3.7   < > 2.8*   CHLORIDE  --   --   --  98  --   --   --   --  99  --  102  --  102   CO2  --   --   --  39*  --   --   --   --  37*  --  38*  --  37*   BUN  --   --   --  29  --   --   --   --  29  --  33*  --  30   CR  --   --   --   0.78  --   --   --   --  0.69  --  0.65  --  0.71   GLC  --  166* 160* 180* 156*   < >  --    < > 175*   < > 171*   < > 183*    < > = values in this interval not displayed.     Liver Function Tests  No lab results found in last 7 days.  Pancreatic Enzymes  No lab results found in last 7 days.  Coagulation Profile  No lab results found in last 7 days.    5. RADIOLOGY:   No results found for this or any previous visit (from the past 24 hour(s)).    =========================================

## 2022-01-21 NOTE — PLAN OF CARE
Shift Summary  3000u heparin bolus this morning. 1u PRBCs for Hgb 6.9      Neuro: RASS -1, Follows commands, weak grasps, wiggles toes LLE, attempts to wiggle toes RLE.     Cardiac: SR-ST. Levo utilized during shift to maintain MAP>65. See MAR and vitals flowsheet.     Pulmonary: LS coarse, small to moderate secretions. CT draining dark red output; see flowsheet. No subcutaneous emphysema.     GI: Rectal tube in place; 150cc liquid stool out this shift. TF running at goal.     : Bobo draining adequate output.     Integumentary: See flowsheet for documentation.     Restraints: Bilateral wrist restraints in place and checked q2h. Order active.     Activity: Bedrest.     Plan of care has been explained to patient: Yes as well as to  Fernando. Per MD and Fernando, pt is to go to comfort care on Sunday after family has a chance to visit her.     Tiara Flores RN

## 2022-01-22 NOTE — PROGRESS NOTES
ICU Update    Family arrived ready to pursue comfort and compassionate extubation.     Orders placed DNR     Rony Morris MD

## 2022-01-22 NOTE — DEATH PRONOUNCEMENT
MD DEATH PRONOUNCEMENT    Called to pronounce Amanda Richardson dead.    Physical Exam: Unresponsive to noxious stimuli, Spontaneous respirations absent, Breath sounds absent, Carotid pulse absent, Heart sounds absent and Pupillary light reflex absent    Patient was pronounced dead at 17:24, January 22, 2022.    Preliminary Cause of Death: Acute Hypoxemic Respiratory Failure - aspiration pneumonitis, hemothorax     Active Problems:    Hyperkalemia    Anuria    Encephalopathy    Muscle spasm    Shock liver    NSTEMI (non-ST elevated myocardial infarction) (H)    Emphysematous pyelitis    Decubitus ulcer of left leg    Septic shock (H)    Acute renal failure, unspecified acute renal failure type (H)    Acute hypoxemic respiratory failure (H)     Rest in peace, Ms Richardson    Body disposition: Body released to the [facility] [place].

## 2022-01-22 NOTE — PLAN OF CARE
Neuro: Alert, follows commands. RASS-1.   CV: NSR/sinus tachycardia  Respiratory: Full vent support. PEEP increased to 14 and FiO2 increased to 100% after a prolonged episode of hypoxia following a turn (occurred around 2300). After the hypoxic episode patient was unable to be weaned back down for the rest of the night. Blood-tinged secretions through ETT.  GI/: Good UOP via ramos. Large stool output via rectal tube.   Skin: Pressure injuries on coccyx unchanged. Rash to abdominal skin folds.   Activity: Bedrest  Diet: NPO with tube feed at goal.  Drips: Precedex for sedation, increased slightly due to vent desynchrony. Levophed adjusted multiple times for liable blood pressure. Heparin drip rate increased per protocol, next 10a check at 1300.   Other: Potassium replaced this AM  Plan: If possible wean vent support if patient can tolerate. Likely transitioning to comfort care.

## 2022-01-22 NOTE — PLAN OF CARE
Writer spoke to patient's  Fernando in person at 1350 regarding patient's code status (currently Full Code with plan to go comfort care on 1.23.22). Writer educated Fernando on pros/cons of Full Code vs DNR. Fernando verbalized understanding of both options and stated he needed to speak to his son prior to making any changes to code status.

## 2022-01-22 NOTE — PROGRESS NOTES
[facility] ICU RESPIRATORY NOTE           Date of Admission: 12/23/2021     Date of Intubation (most recent): 1/8/22 (re-intubated 1/19/22 after self-extubation.)     Reason for Mechanical Ventilation: Resp Failure     Number of Days on Mechanical Ventilation: 15     Met Criteria for Spontaneous Breathing Trial: No     Reason for No Spontaneous Breathing Trial: Per MD     Significant Events Today: Pt with episode of desaturation after turn, FiO2 now 100%, peep increased to 14     Recent Labs   Lab 01/20/22  0618 01/20/22  0404 01/19/22  1239 01/17/22  0625 01/16/22  1147   PH 7.37  --  7.37 7.40 7.39   PCO2 71*  --  70* 60* 60*   PO2 93  --  73* 70* 81   HCO3 41*  --  40* 37* 37*   O2PER 100 100 90 75 60     Ventilation Mode: CMV/AC  (Continuous Mandatory Ventilation/ Assist Control)  FiO2 (%): 100 %  Rate Set (breaths/minute): 28 breaths/min  Tidal Volume Set (mL): 410 mL  PEEP (cm H2O): 12 cmH2O  Oxygen Concentration (%): 100 %  Resp: 28    JERZY Mayo, RRT

## 2022-01-23 NOTE — PLAN OF CARE
Shift Summary    Family planning to go to comfort care 1/22/22 instead of 1/23/22. Family came for end of life visits. Family informed writer they were ready to begin comfort care. MD updated and comfort orders placed. Medications administered (see MAR). RT changed to pressure support at 1705, RT pulled ETT 1710. Time of death 1724.    Tiara Flores RN

## 2022-01-23 NOTE — PROGRESS NOTES
ICU STAFF:  Patient seen and notes reviewed. Per family discussion and Dr. Foster's note the decision was made by the patient's  to pursue comfort care, timing contingent upon arrival of other family members.    Mr. Richardson and his son are present this afternoon and desire proceeding to comfort care for Amanda this evening.    Formal DNR/DNI placed and comfort care orders placed.      Silva Barclay MD  #[id number]

## 2022-01-24 NOTE — DISCHARGE SUMMARY
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    Hyperkalemia    Anuria    Encephalopathy    Muscle spasm    Shock liver    NSTEMI (non-ST elevated myocardial infarction) (H)    Emphysematous pyelitis    Decubitus ulcer of left leg    Septic shock (H)    Acute renal failure, unspecified acute renal failure type (H)    Acute hypoxemic respiratory failure (H)    Coagulopathy , unspecified    Platelet dysfunction, med induced    Electrolyte abnormalities, hypocalcemia           Procedures:   1/5: thoracentesis  1/8: bronchoscopy  1/8: placement central venous catheter  1/8: placement of intra arterial line  1/9: bronchoscopy  1/9: Placement of thoracostomy tubje  1/19: Bronchoscopy          Consultations:   PHARMACY TO DOSE VANCO  PHARMACY IP CONSULT  NUTRITION SERVICES ADULT IP CONSULT  SPEECH LANGUAGE PATH ADULT IP CONSULT  CARDIOLOGY IP CONSULT  PHYSICAL THERAPY ADULT IP CONSULT  OCCUPATIONAL THERAPY ADULT IP CONSULT  LYMPHEDEMA THERAPY IP CONSULT  INFECTIOUS DISEASES IP CONSULT  PULMONARY IP CONSULT  UROLOGY IP CONSULT  CARE MANAGEMENT / SOCIAL WORK IP CONSULT  PHARMACY IP CONSULT  PHARMACY IP CONSULT  [place]  VASCULAR MEDICINE IP CONSULT  INTENSIVIST IP CONSULT  NUTRITION SERVICES ADULT IP CONSULT  NEPHROLOGY IP CONSULT  VASCULAR ACCESS ADULT IP CONSULT          Brief History of Illness:   Amanda Richardson is a 56 year old female with history of MS with baclofen pump in place, neurogenic bladder, recurrent UTIs, chronic pain syndrome, DM II, hypertension, hyperlipidemia, CKD, Non-hodgkin's lymphoma, Obesity who was admitted on 12/23/21 after presenting to ED with altered mental status. Patient was intubated, treated for septic shock due to UTI, blood Cx (+) E fecalis. She was extubated 12/30/21, transferred to floor 12/31/21.  S/p  left therapeutic thoracentesis (500 ml) on 1/5/22 for hypoxia . RRT 1/8/22 for severe hypoxia prompting intubation and ICU transfer. Large hemothorax was noted and thoracostomy tube placed, repeat imaging showed retained hemothorax, Thoracic surgery was consulted for VATS however vent requirements remains to high and were prohibitive to surgical intervention or trach. Following care conference and discussion of goals of care family desired to no escalate care and focus on patient comfort, following compassionate extubation she passed shortly thereafter.        Hospital Course:   Ms Richardson was first admitted to the ICU for septic shock and resp failure following severe UTI, she was treated in the ICU, weaned from the cent and stabilized for downgrade, on the floor she decompensated requiring thoracentesis to help her hypoxia, following the procedure she further decompensated and was returned to the ICU, she as intubated, A-line, central line and chest tube was placed for large hemothorax noted on CT. She also underwent x3 bronchoscopy for both therapeutic and diagnostic reasons. She continued to have increasing o2 requirements despite aggressive supportive measures. Given her trajectory and poor prognosis a care conference was held and ultimately the patient's family decided to  not escalate  care and focus on patient comfort, following compassionate extubation she passed shortly thereafter.            Imaging Studies:     Results for orders placed or performed during the hospital encounter of 12/23/21   CT Head w/o Contrast    Narrative    EXAM: CT HEAD W/O CONTRAST, CT CERVICAL SPINE W/O CONTRAST  LOCATION: [facility]  DATE/TIME: 12/23/2021 10:21 PM    INDICATION: Mental status change, unknown cause, unresponsive  COMPARISON: CT head 03/16/2019  TECHNIQUE:   1) Routine CT Head without IV contrast. Multiplanar reformats. Dose reduction techniques were used.  2) Routine CT Cervical Spine without IV contrast. Multiplanar reformats. Dose reduction techniques were used.    FINDINGS:   HEAD CT:   INTRACRANIAL CONTENTS: No intracranial hemorrhage, extraaxial collection, or mass effect.  No CT evidence of acute infarct. Normal parenchymal attenuation. Normal ventricles and sulci.     VISUALIZED ORBITS/SINUSES/MASTOIDS: No intraorbital abnormality. No paranasal sinus mucosal disease. No middle ear or mastoid effusion.    BONES/SOFT TISSUES: No acute abnormality.    CERVICAL SPINE CT:   VERTEBRA: Slight degenerative anterolisthesis C4 on C5. Alignment is otherwise normal. No acute fracture or posttraumatic subluxation. Marked loss of disc space height C3-C4 through C6-C7. Mild to moderate multilevel facet arthropathy.    CANAL/FORAMINA: Moderate narrowing of the canal C3-C4, C5-C6 and mild changes elsewhere. Moderate to marked bilateral C3-C4 and right C5-C6 degenerative foraminal narrowing.    PARASPINAL: No definite extraspinal abnormality. Visualized lung apices are clear. Scattered areas of gas throughout multiple venous structures likely related to IV access. Endotracheal tube and nasoenteric tube partially visualized.      Impression    IMPRESSION:  HEAD CT:  1.  No acute intracranial abnormality.    CERVICAL SPINE CT:  1.  No CT evidence for acute fracture or post traumatic  subluxation.     XR Chest Port 1 View    Narrative    EXAM: XR CHEST PORT 1 VIEW  LOCATION: [facility]  DATE/TIME: 12/23/2021 9:31 PM    INDICATION: Post intubation.  COMPARISON: 4/10/2019.    FINDINGS: ET tube approximately 5 cm above the ron. NG tube passes into the stomach. No pneumothorax. The heart is at the upper limits normal in size. There is no pulmonary edema. There are bibasilar infiltrates.      Impression    IMPRESSION: ET tube 5 cm above the ron.   CT Chest Abdomen Pelvis w/o Contrast    Narrative    EXAM: CT CHEST ABDOMEN PELVIS W/O CONTRAST  LOCATION: [facility]  DATE/TIME: 12/23/2021 10:21 PM    INDICATION: Sepsis  COMPARISON: 03/16/2019  TECHNIQUE: CT scan of the chest, abdomen, and pelvis was performed without IV contrast. Multiplanar reformats were obtained. Dose reduction techniques were used.   CONTRAST: None.    FINDINGS:   LUNGS AND PLEURA: Mosaic attenuation. Bibasilar atelectasis or infiltrate. Trace pleural effusions.    MEDIASTINUM/AXILLAE: No adenopathy or pericardial effusion. Endotracheal and enteric tube.    CORONARY ARTERY CALCIFICATION: Mild-to-moderate.    HEPATOBILIARY: Cholelithiasis.    PANCREAS: Normal.    SPLEEN: Normal.    ADRENAL GLANDS: Thickening of the adrenal glands.    KIDNEYS/BLADDER: Multiple, bilateral renal calculi. Larger on the left 7 mm. Small amount of air in the left renal collecting system. Bobo within the bladder.    BOWEL: Normal caliber.    LYMPH NODES: Bilateral hypodensity along the pelvic sidewall. On the right 4.7 x 2.3 cm series 4 image 256 and on the left 3.9 x 1.7 cm image 262.    VASCULATURE: Atherosclerotic vascular calcification.    PELVIC ORGANS: 1.6 cm fatty lesion within the uterus.    MUSCULOSKELETAL: Postsurgical change lumbar spine. Chronic fracture right proximal femur. Degenerative change osseous structures. Implanted device right ventral abdomen.      Impression    IMPRESSION:  1.   Bibasilar atelectasis or infiltrate and trace pleural effusions.  2.  Small amount of air in the left renal collecting system. Was there recent instrumentation? Correlate for emphysematous pyelitis.  3.  Cholelithiasis. Gallbladder wall thickening not excluded.  4.  Trace amount of free fluid.  5.  Bilateral renal calculi.  6.  1.6 cm fatty lesion within the uterus.  7.  Bilateral oval densities along the pelvic sidewall. Uncertain if these are chronic fluid collection/seroma. Possibility of adenopathy not excluded. Follow-up recommended.   Cervical spine CT w/o contrast    Narrative    EXAM: CT HEAD W/O CONTRAST, CT CERVICAL SPINE W/O CONTRAST  LOCATION: [facility]  DATE/TIME: 12/23/2021 10:21 PM    INDICATION: Mental status change, unknown cause, unresponsive  COMPARISON: CT head 03/16/2019  TECHNIQUE:   1) Routine CT Head without IV contrast. Multiplanar reformats. Dose reduction techniques were used.  2) Routine CT Cervical Spine without IV contrast. Multiplanar reformats. Dose reduction techniques were used.    FINDINGS:   HEAD CT:   INTRACRANIAL CONTENTS: No intracranial hemorrhage, extraaxial collection, or mass effect.  No CT evidence of acute infarct. Normal parenchymal attenuation. Normal ventricles and sulci.     VISUALIZED ORBITS/SINUSES/MASTOIDS: No intraorbital abnormality. No paranasal sinus mucosal disease. No middle ear or mastoid effusion.    BONES/SOFT TISSUES: No acute abnormality.    CERVICAL SPINE CT:   VERTEBRA: Slight degenerative anterolisthesis C4 on C5. Alignment is otherwise normal. No acute fracture or posttraumatic subluxation. Marked loss of disc space height C3-C4 through C6-C7. Mild to moderate multilevel facet arthropathy.    CANAL/FORAMINA: Moderate narrowing of the canal C3-C4, C5-C6 and mild changes elsewhere. Moderate to marked bilateral C3-C4 and right C5-C6 degenerative foraminal narrowing.    PARASPINAL: No definite extraspinal abnormality.  Visualized lung apices are clear. Scattered areas of gas throughout multiple venous structures likely related to IV access. Endotracheal tube and nasoenteric tube partially visualized.      Impression    IMPRESSION:  HEAD CT:  1.  No acute intracranial abnormality.    CERVICAL SPINE CT:  1.  No CT evidence for acute fracture or post traumatic subluxation.     XR Chest Port 1 View    Narrative    EXAM: XR CHEST PORT 1 VIEW  LOCATION: [facility]  DATE/TIME: 12/23/2021 11:15 PM    INDICATION: Line placement  COMPARISON: 12/23/2021      Impression    IMPRESSION: Cardiac enlargement. Endotracheal tube 5.6 cm above ron. Enteric tube below lower aspect of film. Right IJ line in SVC. Bibasilar atelectasis or infiltrate. Small left effusion.   XR Chest Port 1 View    Narrative    EXAM: XR CHEST PORT 1 VIEW  LOCATION: [facility]  DATE/TIME: 12/24/2021 5:16 AM    INDICATION: Endotracheal tube positioning  COMPARISON: 12/23/2021      Impression    IMPRESSION: Endotracheal tube 6.5 cm above ron. Enteric tube and right IJ line. Right perihilar and bibasilar atelectasis or infiltrate. Small left effusion. Atherosclerotic aorta and degenerative change osseous structures.   XR Chest Port 1 View    Narrative    CHEST ONE VIEW  12/27/2021 12:46 PM     HISTORY: Increase in vent settings    COMPARISON: December 24, 2021      Impression    IMPRESSION: Endotracheal tube tip approximately 6.5 cm from the  ron. Right IJ line stable. Probable increase in left lower lobe  atelectasis and/or infiltrate. Increased groundglass infiltrates at  the right base compared to previous.    DERECK MIRANDA MD         SYSTEM ID:  [id number]   XR Abdomen Port 1 View    Narrative    EXAM: XR ABDOMEN PORT 1 VIEWS  LOCATION: [facility]  DATE/TIME: 12/28/2021 3:41 AM    INDICATION: NJ placement  COMPARISON: 12/23/2021      Impression    IMPRESSION: Feeding tube has been placed,  looped in the body of the stomach with the tip oriented superiorly projecting over the gastric fundus. Partially imaged spinal fusion hardware.    XR Abdomen Port 1 View    Narrative    EXAM: XR ABDOMEN PORT 1 VIEWS  LOCATION: [facility]  DATE/TIME: 12/28/2021 4:15 AM    INDICATION: NJ placement  COMPARISON: 12/28/2021      Impression    IMPRESSION: Interval advancement of feeding tube with the distal tip of the tube angulated or potentially kinked over the lumbar spine in the region of the pyloric channel. Visualized bowel gas pattern is nonobstructive.   XR Chest Port 1 View    Narrative    XR CHEST PORT 1 VIEW  1/3/2022 10:24 AM       INDICATION: increased dyspnea  COMPARISON: 12/27/2021       Impression    IMPRESSION: New feeding tube courses below the diaphragm. Endotracheal  tube, right IJ CVC, and NG tube have been removed. Cardiomegaly. Mild  infiltrate or atelectasis in the lung bases, mildly improved on the  left and stable on the right. No new findings.    JOHNATHAN MOULTON MD         SYSTEM ID:  [id number]   CT Abdomen Pelvis w Contrast    Narrative    EXAM: CT ABDOMEN PELVIS W CONTRAST  LOCATION: [facility]  DATE/TIME: 1/3/2022 5:02 PM    INDICATION: Abdominal abscess/infection suspected.  COMPARISON: None.  TECHNIQUE: CT scan of the abdomen and pelvis was performed following injection of IV contrast. Multiplanar reformats were obtained. Dose reduction techniques were used.  CONTRAST: 135 mL Isovue-370.    FINDINGS:   LOWER CHEST: Bibasilar consolidation with tiny amount of pleural fluid. Cardiac enlargement.    HEPATOBILIARY: Calcified gallstones.    PANCREAS: Normal.    SPLEEN: Normal.    ADRENAL GLANDS: Stable mild nodular enlargement of the left adrenal gland.    KIDNEYS/BLADDER: Stable nonobstructing renal stones.    BOWEL: Nasogastric tube has been removed and replaced with a feeding tube with tip at the gastroduodenal junction.    LYMPH NODES:  Normal.    VASCULATURE: Unremarkable.    PELVIC ORGANS: Lipid rich fibroid. Anasarca with ill-defined fluid in the subcutaneous tissues. Bobo catheter in the urinary bladder. Perirectal soft tissue haziness is stable. Bilateral sidewall lymphadenopathy unchanged.    MUSCULOSKELETAL: Surgical changes in the lumbar spine.      Impression    IMPRESSION:   1.  No significant change. There is stable pelvic sidewall lymphadenopathy.    2.  Bibasilar pulmonary consolidation with trace pleural effusions.    3.  Nonobstructing renal stones.    4.  Nasogastric tube has been removed and replaced with a feeding tube with tip at the duodenojejunal junction.    5.  Postsurgical changes in the lumbar spine. Chronic displaced fracture of the right femoral neck.    6.  Cholelithiasis.    7.  Lipid rich fibroid in the uterus.   US Upper Extremity Venous Duplex Right    Narrative    ULTRASOUND RIGHT UPPER EXTREMITY VENOUS DUPLEX  1/5/2022 2:32 PM    CLINICAL HISTORY/INDICATION:  Arm swelling, edema vs DVT.    COMPARISON:  None relevant    TECHNIQUE: Grayscale, color-flow, and spectral waveform analysis were  performed of the deep veins of the right upper extremity    FINDINGS: Small focal nonocclusive deep vein thrombosis in the right  internal jugular vein. Superficial thrombus noted in the cephalic vein  throughout the upper arm and proximal forearm.    The right subclavian vein, axillary vein, brachial vein and basilic  vein demonstrate normal compressibility, spectral waveform, color flow  and augmentation.      Impression    IMPRESSION:   1. Focal short segment nonocclusive deep vein thrombosis in the right  internal jugular vein.  2. Superficial thrombus in the cephalic vein from the mid forearm to  the proximal upper arm.    KAVON GELLER DO         SYSTEM ID:  [id number]   XR Chest Port 1 View    Narrative    XR CHEST PORT 1 VIEW 1/5/2022 10:41 AM    HISTORY: Follow up -pneumonia vs edema    COMPARISON: 1/3/2022       Impression    IMPRESSION: Interval near complete opacification of the left  hemithorax possibly related to a large pleural effusion, or a  bronchial mucous plug. The right lung is clear. Feeding tube coursing  below left hemidiaphragm.    ELLI EVANS MD         SYSTEM ID:  [id number]   US Thoracentesis    Narrative    US THORACENTESIS 1/5/2022 2:21 PM    CLINICAL HISTORY: left pleural effusion, respiratory failure    PROCEDURE: Informed consent obtained. Time out performed. The chest  was prepped and draped in sterile fashion. 10 mL of 1% Xylocaine was  infused into the local soft tissues. Under direct ultrasound guidance,  a 5 [place] catheter system was placed into the pleural effusion.     500 mL of yellow fluid were removed and sent to lab, if requested.    Patient tolerated procedure well.    Ultrasound imaging was obtained and placed in the patient's permanent  medical record.      Impression    IMPRESSION:  1.  Status post left ultrasound-guided thoracentesis.    Reference CPT Code: 94949    ELLI EVANS MD         SYSTEM ID:  [id number]   XR Chest Port 1 View    Narrative    EXAM: XR CHEST PORT 1 VIEW  LOCATION: [facility]  DATE/TIME: 1/6/2022 12:14 AM    INDICATION: Increasing O2 demand.  COMPARISON: 1/5/2022.    FINDINGS: Nasoenteric feeding tube passes into the stomach. The heart is enlarged. There is no pulmonary edema. Left pleural effusion has decreased in the interval. There are left upper lobe and left basilar consolidations. Mild infiltrate in the right   upper lobe inferiorly. Atelectasis or scar at the right lung base.      Impression    IMPRESSION: Bilateral pulmonary infiltrates, left greater than right. Decreasing left pleural effusion.   XR Chest Port 1 View    Narrative    EXAM: XR CHEST PORTABLE 1 VIEW  LOCATION: [facility]  DATE/TIME: 01/08/2022, 3:20 AM    INDICATION: Hypoxia.  COMPARISON: 01/06/2022.      Impression    IMPRESSION: Complete  opacification of the left hemithorax. This has significantly increased from the prior study and probably due to increasing large pleural effusion. Likely compressive atelectasis in the left lung. Infiltrate/consolidation in the right   infrahilar region, increased from previous. Heart size difficult to evaluate due to opacification on the left. Pulmonary vascularity within normal limits. No significant bony abnormalities. Enteric tube tip below the diaphragm.     XR Chest Port 1 View    Narrative    EXAM: XR CHEST PORTABLE 1 VIEW  LOCATION: [facility]  DATE/TIME: 01/08/2022, 5:53 AM    INDICATION: Location of ET tube, central line.  COMPARISON: 01/08/2022.    FINDINGS: ET tube 5.5 cm above the ron. Right IJ central venous catheter tip projects over the distal SVC. Nasoenteric feeding tube passes into the stomach. There is no pneumothorax. Improved aeration of the left lung compared to the previous exam.   Left pleural effusion and associated left lung infiltrate. Improving right infrahilar consolidation.      Impression    IMPRESSION: ET tube 5.5 cm above the ron.     XR Abdomen Port 1 View    Narrative    EXAM: XR ABDOMEN PORT 1 VIEWS  LOCATION: [facility]  DATE/TIME: 1/8/2022 11:16 AM    INDICATION: Check OG tube placement.  COMPARISON: CT 1/3/2022      Impression    IMPRESSION: Orogastric tube tip and side port are in the gastric fundus. Nasoenteric feeding tube tip extends postpyloric and is not included on exam. Moderate left effusion and left basilar consolidation redemonstrated.    XR Chest Port 1 View    Narrative    EXAM: XR CHEST PORT 1 VIEW  LOCATION: [facility]  DATE/TIME: 1/8/2022 11:58 AM    INDICATION: Check ET tube position, status post repositioning.  COMPARISON: 1/8/2022 and 0557 hours      Impression    IMPRESSION: Endotracheal tube is 5 cm above the ron. Nasoenteric feeding tube and orogastric tube tip below  diaphragm. Right IJ central line tip at cavoatrial junction. Heart size magnified in AP projection. Moderate left pleural effusion and left   basilar consolidation unchanged. Improved aeration medial right lung base. No pneumothorax.   XR Chest Port 1 View    Narrative    EXAM: XR CHEST PORTABLE 1 VIEW  LOCATION: [facility]  DATE/TIME: 01/09/2022, 12:27 PM    INDICATION: Acute respiratory failure, pneumonia.  COMPARISON: 01/08/2022.      Impression    IMPRESSION: Endotracheal tube, right internal jugular central venous catheter with tip in the distal SVC, good location, are unchanged. Esophagogastric tube courses below the level of the field of view. Inferior most aspect of both hemithoraces excluded   from field-of-view. Near-complete opacification of the left hemithorax, likely in combination of pleural effusion and consolidation given air bronchograms. A minimal amount of lung is aerated at the left lung apex, worsened since previous exam. No   pneumothorax.     US Renal Complete    Narrative    EXAM: US RENAL COMPLETE  LOCATION: [facility]  DATE/TIME: 1/9/2022 10:42 AM    INDICATION: Septic shock, now with oliguric JULIANNE. CT 1/3 with non obstructing stones, evaluate for hydronephrosis.  COMPARISON: None available.  TECHNIQUE: Routine Bilateral Renal and Bladder Ultrasound.    FINDINGS: Technically challenging exam due to patient's body habitus and very limited mobility.    RIGHT KIDNEY: Measures 15.5 x 7.0 x 5.2 cm. It demonstrates normal cortical echogenicity and thickness of 1.9 cm. No hydronephrosis or shadowing calculi.     LEFT KIDNEY: Measures 12.5 x 6.1 x 6.1 cm. It demonstrates normal cortical echogenicity and thickness of 1.9 cm. No hydronephrosis or shadowing calculi.     BLADDER: Decompressed with Bobo's catheter.    Incidentally noted gallstones and gallbladder sludge.      Impression    IMPRESSION: Technically challenging exam due to patient's body  habitus and limited mobility.  1.  No hydronephrosis or shadowing calculi in either kidney.  2.  Incidentally noted cholelithiasis and gallbladder sludge.   CT Chest/Abdomen/Pelvis w Contrast    Narrative    EXAM: CT CHEST/ABDOMEN/PELVIS WITH CONTRAST  LOCATION: [facility]  DATE/TIME: 01/09/2022, 3:06 PM    INDICATION: Sepsis.  COMPARISON: 12/23/2021.  TECHNIQUE: CT scan of the chest, abdomen, and pelvis was performed following injection of IV contrast. Multiplanar reformats were obtained. Dose reduction techniques were used.   CONTRAST: 124 mL Isovue-370.     FINDINGS:   LUNGS AND PLEURA: Complete atelectasis of the left lung. There is a large pleural effusion with evidence of multiple internal loculations. There is high-density within the pleural fluid in the left hemithorax suggesting pockets of hemorrhage. A small   right pleural effusion is present. There is interlobular septal thickening in the right lung and areas of groundglass opacity.    MEDIASTINUM/AXILLAE: Right internal jugular central line terminates in the SVC. An endotracheal tube is appropriately positioned. A feeding tube extends into the distal duodenum. A nasogastric tube terminates in the stomach. The thyroid gland is   unremarkable. No pathologically enlarged thoracic or axillary lymph nodes. Caliber of the thoracic aorta is within normal limits.    CORONARY ARTERY CALCIFICATION: Mild.    HEPATOBILIARY: Gallstones are present in the gallbladder. No worrisome liver lesions.    PANCREAS: Normal.    SPLEEN: Normal.    ADRENAL GLANDS: Normal.    KIDNEYS/BLADDER: Nonobstructing 7 mm calculus in the right renal collecting system. A few tiny calcifications in the left kidney, decreased from prior. No hydronephrosis or worrisome renal mass. Urinary bladder decompressed by a Bobo catheter.    BOWEL: No bowel obstruction or inflammatory change. Normal appendix. No free air or intra-abdominal abscess. No ascites.    LYMPH NODES:  Normal.    VASCULATURE: Nonaneurysmal aortic atherosclerosis.    PELVIC ORGANS: Fat-containing lesion in the left pelvis measuring 1.8 cm (series 3, image 262) likely a small dermoid.    MUSCULOSKELETAL: Chronic right femoral neck fracture noted. Lumbosacral fusion hardware noted. No destructive bone lesions.      Impression    IMPRESSION:  1.  There is a large, multiloculated, left pleural effusion with areas of high-density suggesting hemorrhagic contents. The possibility of empyema is not excluded.  2.  Complete atelectasis of the left lung.  3.  Small right pleural effusion associated with groundglass opacities and interlobular septal thickening in the right lung could be related to infection or pulmonary edema.  4.  Cholelithiasis.  5.  Nonobstructing nephrolithiasis.     XR Chest Port 1 View    Narrative    EXAM: XR CHEST PORT 1 VIEW  LOCATION: [facility]  DATE/TIME: 1/9/2022 4:52 PM    INDICATION: chest tube placement  COMPARISON: Radiograph and CT of the chest earlier today.      Impression    IMPRESSION: Endotracheal tube terminates at about the level of the clavicular heads 8 cm above the ron. Enteric tube passes below the diaphragm and the inferior margin of the radiograph. Right internal jugular venous catheter tip in the mid SVC. Left   chest tube has been placed which terminates in the mid left pleural space. Left pleural effusion has decreased in volume and is now small to moderate. There is a new small left apical pneumothorax with maximal pleural separation of 12 mm. Stable cardiac   silhouette.   XR Chest Port 1 View    Narrative    EXAM: CHEST SINGLE VIEW PORTABLE  LOCATION: [facility]  DATE/TIME: 1/10/2022 5:40 AM    INDICATION: Respiratory failure. Pneumonia.  COMPARISON: 1/9/2022 at 1650 hours.      Impression    IMPRESSION:   1.  A moderate-sized region of hazy opacities in the inferior right lung that could relate to atelectasis, an  increasing right pleural effusion or pneumonia, new since the recent comparison study.  2.  The left apical pneumothorax described on the comparison study is not clearly visualized on this study.  3.  No other convincing interval change.   4.  Patchy opacities within the mid and lower left lung and probable left pleural effusion again noted.   5.  An endotracheal tube, enteric feeding tube, left pleural drain, and right internal jugular central venous catheter are again noted.    XR Chest Port 1 View    Narrative    EXAM: XR CHEST PORT 1 VIEW  LOCATION: [facility]  DATE/TIME: 1/11/2022 5:27 AM    INDICATION: Left hemothorax s/p chest tube, eval hemothorax drainage  COMPARISON: 01/10/2022      Impression    IMPRESSION: Endotracheal tube terminates at 5.1 cm from the ron. Enteric tube terminates below diaphragm and field of view. Right-sided central venous catheter with tip over SVC.    Left-sided chest tube in situ. Stable small to moderate left pleural effusion. No pneumothorax appreciated. Improved aeration at the right lung base with mild right basilar opacity. Stable moderately enlarged cardiac silhouette.   XR Chest Port 1 View    Narrative    EXAM: XR CHEST PORT 1 VIEW  LOCATION: [facility]  DATE/TIME: 1/12/2022 5:17 AM    INDICATION: Left hemothorax  COMPARISON: 01/11/2022      Impression    IMPRESSION: No interval change. Endotracheal tube terminates at 5.6 cm from the ron. Enteric tubes terminating below diaphragm and field of view. Right-sided central venous catheter with tip terminating over SVC. Left-sided chest tube unchanged in   position. Unchanged moderate left hemothorax. Stable cardiomediastinal silhouette with atherosclerotic vascular calcifications.   XR Chest Port 1 View    Narrative    EXAM: XR CHEST PORT 1 VIEW  LOCATION: [facility]  DATE/TIME: 1/13/2022 5:35 AM    INDICATION: left hemothorax  COMPARISON:  01/12/2022      Impression    IMPRESSION: Endotracheal tube terminates at 4.3 cm from the ron. Enteric tube terminates below diaphragm and field of view. Right-sided central venous catheter with tip over SVC. Left sided chest tube in situ.    Unchanged moderate to large left pleural effusion. No pneumothorax. Stable cardiomediastinal silhouette.   XR Chest Port 1 View    Narrative    EXAM: XR CHEST PORT 1 VIEW  LOCATION: [facility]  DATE/TIME: 1/14/2022 5:47 AM    INDICATION: interval montoring of left hemothorax  COMPARISON: 01/13/2022      Impression    IMPRESSION: Endotracheal tube, enteric tubes, right IJ line and left chest tube. Bilateral infiltrates. Moderate to large left effusion similar.   XR Chest Port 1 View    Narrative    EXAM: XR CHEST PORTABLE 1 VIEW  LOCATION: [facility]  DATE/TIME: 01/15/2022, 8:17 AM    INDICATION: Monitoring left pleural effusion.  COMPARISON: Chest x-ray 01/14/2022.      Impression    IMPRESSION:   1.  ET tube is 4.3 cm proximal to the ron. Right neck central line  tip is stable at the low SVC. Enteric feeding tube is present.  2.  Stable consolidation and atelectasis at the left lung base. Stable  small left pleural fluid. Stable bilateral vascular congestion and  mild edema pattern. Stable trace right pleural fluid. Stable  cardiomegaly.    POPPY SALINAS MD         SYSTEM ID:  [id number]   XR Chest Port 1 View    Narrative    XR CHEST PORT 1 VIEW 1/16/2022 7:48 AM    HISTORY: s/p chest tube    COMPARISON: 1/15/2022      Impression    IMPRESSION: Endotracheal tube in the midtrachea, right IJ central  venous catheter tip at the SVC/right atrial junction, feeding tube  coursing below the left hemidiaphragm, NG tube in the distal esophagus  and left-sided chest tube. Consider repositioning the NG tube. New  right basilar infiltrate/edema. Stable small left pleural effusion and  left basilar atelectasis. No  pneumothorax.    ELLI EVANS MD         SYSTEM ID:  [id number]   XR Chest Port 1 View    Narrative    EXAM: XR CHEST PORT 1 VIEW  LOCATION: [facility]  DATE/TIME: 1/17/2022 6:05 AM    INDICATION: CT placement, pleural effusion monitoring.  COMPARISON: 1/16/2022      Impression    IMPRESSION: Endotracheal tube, enteric tube, right IJ central venous catheter, and left chest tube are all stable. Moderate to large left pleural effusion is unchanged. Questionable right pleural effusion not seen previously. Mild hazy infiltrate right   lower lung is improved compared with the prior study. Infiltrate and/or atelectasis left mid and lower lung not significantly changed. Heart size is stable. Aortic calcification. No significant bony abnormalities.   CT Chest w/o Contrast    Narrative    CT CHEST W/O CONTRAST 1/17/2022 2:51 PM    CLINICAL HISTORY: Pneumonia, effusion or abscess suspected, xray done    TECHNIQUE: CT chest without IV contrast. Multiplanar reformats were  obtained. Dose reduction techniques were used.  CONTRAST: None.    COMPARISON: Radiograph earlier today and CT on 1/9/2022    FINDINGS:   LUNGS AND PLEURA: Large left and small right pleural effusion, not  significantly changed since 1/9/2022. The effusion on the left has  hyperdense components. Worsening multifocal groundglass, patchy and  tree-in-bud micronodular opacities. Bibasilar dependent and  compressive atelectasis. No pneumothorax. Left-sided chest tube in  place. Endotracheal tube in the midtrachea.    MEDIASTINUM/AXILLAE: Right IJ central venous catheter tip at the  SVC/right atrial junction. Mild mediastinal lymphadenopathy is stable,  likely reactive. No thoracic aortic aneurysm.    UPPER ABDOMEN: Partly visualized small ascites.    MUSCULOSKELETAL: Mild degenerative changes in the spine. No suspicious  lesions in the bones.      Impression    IMPRESSION:   1.  Worsening multifocal groundglass, nodular and patchy  opacities  suspicious for worsening pneumonia.  2.  Stable large left and small right pleural effusion with a  left-sided chest tube in place. The effusion on the left has  hyperdense components, either due to hemorrhagic or tenacious  material. Correlate with outputs.  3.  Partly visualized small ascites.    ELLI EVANS MD         SYSTEM ID:  [id number]   XR Chest Port 1 View    Narrative    EXAM: XR CHEST PORT 1 VIEW  LOCATION: [facility]  DATE/TIME: 1/18/2022 5:38 AM    INDICATION: CT placement, pleural effusion monitoring  COMPARISON: Radiograph and CT of the chest yesterday.      Impression    IMPRESSION: Endotracheal tube in satisfactory position terminating 5.2 cm above the ron. Right internal jugular venous catheter tip in the mid SVC. Enteric tube passes below the diaphragm and the inferior margin radiograph. Left chest tube in   position. Worsening opacification of the left hemithorax consistent with enlargement of a now large left pleural effusion and atelectasis/infiltrate of the mid and lower left lung. Stable hazy alveolar opacities throughout the right lung. Heart size   difficult to evaluate. No pneumothorax visible.   XR Chest Port 1 View    Narrative    EXAM: XR CHEST PORT 1 VIEW  LOCATION: [facility]  DATE/TIME: 1/19/2022 5:04 AM    INDICATION: CT placement, pleural effusion monitoring  COMPARISON: Yesterday.      Impression    IMPRESSION: Endotracheal tube in satisfactory position terminating about 6.5 cm above the ron. Enteric tube passes below the diaphragm and the inferior margin of the radiograph. Right internal jugular venous catheter tip in the mid to lower SVC. Left   pleural drainage catheter remains in position. Moderate to large left pleural effusion appears to have decreased in size from yesterday. No pneumothorax visible. Stable alveolar infiltrates throughout the right lung. Stable cardiac silhouette.   XR Chest Port 1 View     Narrative    CHEST ONE VIEW  2022 10:10 AM     HISTORY: Status post intubation.    COMPARISON: 2022      Impression    IMPRESSION: Endotracheal tube tip 5.7 cm from the ron. Moderate  left effusion similar to previous. Extensive bilateral infiltrates  increased from previous. Right IJ line stable.    DERECK MIRANDA MD         SYSTEM ID:  [id number]   XR Chest Port 1 View    Narrative    EXAM: XR CHEST PORT 1 VIEW  LOCATION: [facility]  DATE/TIME: 2022 4:10 AM    INDICATION: Hypoxia-eval etiology  COMPARISON: 2022.      Impression    IMPRESSION: Overall, appearance of the chest is similar to 2022. Endotracheal tube in satisfactory position terminating 5.1 cm above the ron. Enteric tube passes below the diaphragm and the inferior margin of the radiograph. Right internal   jugular venous catheter tip in the mid to lower SVC. Left chest tube in similar position to previous. Moderate left pleural effusion and atelectasis of the mid and lower left lung not changed. Persistent hazy alveolar infiltrates throughout the right   lung and aerated portion of the left upper lung. No pneumothorax.   Echocardiogram Complete     Value    LVEF  60-65%    Narrative    [id number]  [id number]  [id number]  [id number]^MARISSA^JOSIAH     [facility]  Echocardiography Laboratory  [address] [place] [address]     Name: JIMMY SYLVESTER  MRN: [id number]  : 1965  Study Date: 2022 11:15 AM  Age: 56 yrs  Gender: Female  Patient Location: [facility]  Reason For Study: Atrial Fibrillation  Ordering Physician: JOSIAH ANN  Referring Physician: Julius Hassan  Performed By: Izabela Baldwin     BSA: 2.4 m2  Height: 69 in  Weight: 274 lb  HR: 79  BP: 161/72 mmHg  ______________________________________________________________________________  Procedure  Complete Portable Echo Adult. Optison (NDC #[id number]) given  intravenously.  ______________________________________________________________________________  Interpretation Summary     1. Normal left ventricular size and function. Left ventricular ejection  fraction of 60-65%. No segmental wall motion abnormalities noted.  2. The right ventricle is mildly dilated. The right ventricular systolic  function is normal.  3. Valves not well assessed on this technically difficult study.  Compared to the previous echocardiogram on 2/1/2019, there are no significant  changes. Technically very difficult study.  ______________________________________________________________________________  Left Ventricle  The left ventricle is normal in size. There is concentric remodeling present.  Left ventricular systolic function is normal. The visual ejection fraction is  60-65%. Grade I or early diastolic dysfunction. No regional wall motion  abnormalities noted.     Right Ventricle  The right ventricle is mildly dilated. The right ventricular systolic function  is normal.     Atria  Normal left atrial size. The right atrium is mildly dilated.     Mitral Valve  There is trace mitral regurgitation. There is no mitral valve stenosis.     Tricuspid Valve  The tricuspid valve is not well visualized. Right ventricular systolic  pressure could not be approximated due to inadequate tricuspid regurgitation.     Aortic Valve  The aortic valve is not well visualized. The mean AoV pressure gradient is  12.9 mmHg.     Pulmonic Valve  The pulmonic valve is not well visualized.     Vessels  Normal size aorta. Normal size ascending aorta. IVC diameter and respiratory  changes fall into an intermediate range suggesting an RA pressure of 8 mmHg.     Pericardium  There is no pericardial effusion.     Rhythm  Sinus rhythm was noted.  ______________________________________________________________________________  MMode/2D Measurements & Calculations  IVSd: 1.2 cm     LVIDd: 5.5 cm  LVIDs: 3.0 cm  LVPWd: 1.3 cm  FS:  44.2 %  LV mass(C)d: 283.0 grams  LV mass(C)dI: 119.9 grams/m2  Ao root diam: 3.2 cm  LA dimension: 3.9 cm  asc Aorta Diam: 3.2 cm  LA/Ao: 1.2  LVOT diam: 2.3 cm  LVOT area: 4.2 cm2  LA Volume (BP): 77.3 ml  LA Volume Index (BP): 32.8 ml/m2  RWT: 0.46     Doppler Measurements & Calculations  MV E max oscar: 119.0 cm/sec  MV A max oscar: 112.5 cm/sec  MV E/A: 1.1  MV max P.9 mmHg  MV mean PG: 3.6 mmHg  MV V2 VTI: 40.3 cm  MVA(VTI): 3.7 cm2  MV dec slope: 497.6 cm/sec2  Ao V2 max: 232.4 cm/sec  Ao max P.0 mmHg  Ao V2 mean: 174.4 cm/sec  Ao mean P.9 mmHg  Ao V2 VTI: 52.8 cm  CHRISTA(I,D): 2.8 cm2  CHRISTA(V,D): 2.7 cm2  LV V1 max P.2 mmHg  LV V1 max: 151.3 cm/sec  LV V1 VTI: 36.0 cm  SV(LVOT): 149.7 ml  SI(LVOT): 63.4 ml/m2  PA acc time: 0.08 sec  AV Oscar Ratio (DI): 0.65  CHRISTA Index (cm2/m2): 1.2  E/E' av.6  Lateral E/e': 10.6  Medial E/e': 14.5     ______________________________________________________________________________  Report approved by: Zane Armijo 2022 02:22 PM         Echocardiogram Limited     Value    LVEF  65-70%    [facility]    [id number]  [id number]  [id number]  [id number]^DE LA MATER^JOSE ALEJANDRO^PINKY     [facility]  Echocardiography Laboratory  [address] [place] [address]     Name: JIMMY SYLVESTER  MRN: [id number]  : 1965  Study Date: 2022 01:39 PM  Age: 56 yrs  Gender: Female  Patient Location: [facility]  Reason For Study: Shock  Ordering Physician: JOSE ALEJANDRO LANGFORD  Referring Physician: Julius Hassan  Performed By: Cecil De La Cruz     BSA: 2.3 m2  Height: 69 in  Weight: 246 lb  HR: 95  BP: 116/50 mmHg  ______________________________________________________________________________  Procedure  Limited Portable Echo Adult. Optison (NDC #[id number]) given intravenously.  ______________________________________________________________________________  Interpretation Summary     The left ventricle is normal in size.  Left ventricular  systolic function is normal.  The visual ejection fraction is 65-70%.  No regional wall motion abnormalities noted.  The right ventricle is normal in size and function.  Trace mitral and tricuspid valve regurgitation.  Right ventricle systolic pressure estimate normal  Dilated inferior vena cava (patient is on a mechanical ventilator).     ______________________________________________________________________________  Left Ventricle  The left ventricle is normal in size. There is normal left ventricular wall  thickness. Left ventricular systolic function is normal. The visual ejection  fraction is 65-70%. No regional wall motion abnormalities noted.     Right Ventricle  The right ventricle is normal in size and function.     Atria  Normal left atrial size. Right atrial size is normal. There is no color  Doppler evidence of an atrial shunt.     Mitral Valve  The mitral valve is not well visualized. There is trace mitral regurgitation.     Tricuspid Valve  The tricuspid valve is not well visualized, but is grossly normal. There is  trace tricuspid regurgitation. The right ventricular systolic pressure is  approximated at 12.1 mmHg plus the right atrial pressure. Right ventricle  systolic pressure estimate normal.     Aortic Valve  The aortic valve is not well visualized. No aortic regurgitation is present.  No aortic stenosis is present.     Pulmonic Valve  The pulmonic valve is not well visualized. This degree of valvular  regurgitation is within normal limits.     Vessels  The aortic root is not well visualized. Dilation of the inferior vena cava is  present with abnormal respiratory variation in diameter.     Pericardium  There is no pericardial effusion.     Rhythm  Sinus rhythm was noted.  ______________________________________________________________________________  MMode/2D Measurements & Calculations  IVSd: 1.1 cm     LVIDd: 4.2 cm  LVIDs: 3.8 cm  LVPWd: 0.96 cm  FS: 8.2 %  LV mass(C)d: 140.1 grams  LV  mass(C)dI: 62.1 grams/m2  LA dimension: 2.5 cm  RWT: 0.46     Doppler Measurements & Calculations  TR max sharon: 173.7 cm/sec  TR max P.1 mmHg     ______________________________________________________________________________  Report approved by: Dr Lupe Hall 2022 04:15 PM           *Note: Due to a large number of results and/or encounters for the requested time period, some results have not been displayed. A complete set of results can be found in Results Review.              Medications Prior to Admission:     No medications prior to admission.              Discharge Medications:     Discharge Medication List as of 2022  7:47 PM      CONTINUE these medications which have NOT CHANGED    Details   acetaminophen (TYLENOL) 325 MG tablet Take 2 tablets (650 mg) by mouth every 4 hours as needed for mild pain, Disp-30 tablet, R-1, E-Prescribe      amitriptyline (ELAVIL) 100 MG tablet Take 1 tablet (100 mg) by mouth At Bedtime, Disp-90 tablet, R-3, E-Prescribe      aspirin (ASA) 81 MG chewable tablet Take 162 mg by mouth every morning , Historical      atorvastatin (LIPITOR) 10 MG tablet Take 1 tablet (10 mg) by mouth daily, Disp-90 tablet, R-3, E-Prescribe      B Complex Vitamins (B COMPLEX PO) Take 1 tablet by mouth daily, Historical      baclofen (LIORESAL) 10 MG tablet 10 mg by Per G Tube route 3 times daily as needed for muscle spasms In addition to pump, Historical      baclofen (LIORESAL) intraTHECAL Internal Pump by Intrathecal route continuous prn (467.6 mcg/24h simple continuous infusion) Dr. Gaby Trinidad [facility] manages, pump refill due 2022. Settings last updated 2021., Historical      blood glucose (ACCU-CHEK KEL) test strip Use to test blood sugar 1 times daily or as directed., Disp-100 strip, R-5, E-Prescribe      blood glucose (NO BRAND SPECIFIED) lancets standard Use to test blood sugar 1 times daily or as directed.Disp-100 each, [id number] Preferred Brand  Please      blood glucose calibration (NO BRAND SPECIFIED) solution Use to calibrate blood glucose monitor as needed as directed.Disp-1 each, [id number] Preferred Brand Please      blood glucose monitoring (ACCU-CHEK KEL PLUS) meter device kit Use to test blood sugar 1 times daily or as directed.Disp-1 kit, [id number]      blood glucose monitoring (ACCU-CHEK MULTICLIX) lancets Use to test blood sugar 1 times daily or as directed.Disp-100 each, [id number]      CHLORPHENIRAMINE-ACETAMINOPHEN PO Take 2 tablets by mouth Tablet contains acetaminophen 500 mg, dextromethorphan 15 mg, chlorpheniramine 2 mg, Historical      Chlorpheniramine-DM (COUGH & COLD HBP) 4-30 MG TABS Take 1 tablet by mouth every 6 hours as needed, Historical      DULoxetine (CYMBALTA) 60 MG capsule Take 1 capsule (60 mg) by mouth 2 times daily, Disp-180 capsule, R-3, E-Prescribe      Hesperidin-Diosmin 250-650 MG TABS 2 tabs daily per wound care, Disp-180 tablet, R-3, E-Prescribe      ibuprofen (ADVIL/MOTRIN) 200 MG tablet Take 200 mg by mouth every 4 hours as needed for mild pain, Historical      losartan-hydrochlorothiazide (HYZAAR) 50-12.5 MG tablet Take 1 tablet by mouth daily, Disp-90 tablet, R-3, E-Prescribe      metFORMIN (GLUCOPHAGE) 500 MG tablet 1 Tabs QAM and 2 Tabs QPM, Disp-270 tablet, R-3, E-Prescribe      metoprolol succinate ER (TOPROL-XL) 50 MG 24 hr tablet Take 1 tablet (50 mg) by mouth daily, Disp-90 tablet, R-3, E-Prescribe      Multiple Vitamin (MULTI-VITAMIN PO) Take 1 tablet by mouth daily , Historical      naloxone (NARCAN) 4 MG/0.1ML nasal spray Spray 1 spray (4 mg) into one nostril alternating nostrils once as needed for opioid reversal Every 2-3 minutes until patient responsive or EMS arrives, Disp-0.2 mL, R-0, Local Print      omega-3 fatty acids (FISH OIL) 1200 MG capsule Take 1 capsule by mouth daily., Historical      oxyCODONE IR (ROXICODONE) 15 MG tablet Take 1 tablet (15 mg) by mouth every 6  hours as needed for moderate to severe pain Limit 4 tablet(s) per day., Disp-120 tablet, R-0, E-Prescribe      senna-docusate (SENOKOT-S/PERICOLACE) 8.6-50 MG tablet Take 2 tablets by mouth daily as needed for constipation, Disp-180 tablet, R-0, No Print Out      Vitamin D3 (CHOLECALCIFEROL) 125 MCG (5000 UT) tablet Take 2 tablets by mouth every morning, Historical         STOP taking these medications       doxycycline hyclate (VIBRA-TABS) 100 MG tablet Comments:   Reason for Stopping:                       Medications Discontinued or Adjusted During This Hospitalization:   No change           Antibiotics Prescribed at Discharge:   None prescribed           Day of Discharge Physical Exam:      Physical Exam: Unresponsive to noxious stimuli, Spontaneous respirations absent, Breath sounds absent, Carotid pulse absent, Heart sounds absent and Pupillary light reflex absent           Final Pathology Result:   None           Discharge Instructions and Follow-Up:     Discharge Procedure Orders   Medication Therapy Management Referral   Referral Priority: Routine Referral Type: Med Therapy Management   Requested Specialty: Pharmacist   Number of Visits Requested: 1              Home Health Care:     Not needed           Discharge Disposition:     Discharged to [facility]       Condition at discharge:     Nicolas Foster DO  [facility] Fellow

## 2022-02-07 LAB
BACTERIA BRONCH: ABNORMAL
BACTERIA BRONCH: NO GROWTH
BACTERIA PLR CULT: NO GROWTH

## 2022-02-13 NOTE — PLAN OF CARE
A/Ox4, Asstx2 to commode and chair, puriwick placed on NOC shift per pt request for rest/comfort - voiding spontaneously without difficulty, VSS, foam to coccyx changed - small scabbing with blanchable erythema, scheduled oxy 15mg for pain 6-7/10, no PIV access - MD aware, , BLE edema +1 to +2, ok to discharge - awaiting placement, continue with plan of care.    Pt plans for epidural. All pain management and non-pharmacological interventions discussed with patient.

## 2022-03-05 LAB — ACID FAST STAIN (ARUP): NORMAL

## 2022-03-07 LAB — ACID FAST STAIN (ARUP): NORMAL

## 2023-01-13 NOTE — ED NOTES
PRE-OP EVALUATION    Patient Name: Damian Renae    Admit Diagnosis: ADENOID HYPERTROPHY, CHRONIC SEROUS OTITIS MEDIA, BILATERAL    Pre-op Diagnosis: ADENOID HYPERTROPHY, CHRONIC SEROUS OTITIS MEDIA, BILATERAL    BILATERAL TYMPANOSTOMY REQUIRING INSERTION OF VENTILATING TUBES, BILATERAL ADENOIDECTOMY    Anesthesia Procedure: BILATERAL TYMPANOSTOMY REQUIRING INSERTION OF VENTILATING TUBES, BILATERAL ADENOIDECTOMY (Bilateral)  ADENOIDECTOMY (Bilateral)    Surgeon(s) and Role:     Maxim Vaughn MD - Primary    Pre-op vitals reviewed. There is no height or weight on file to calculate BMI. Current medications reviewed. Hospital Medications:  No current facility-administered medications on file as of 1/13/2023. Outpatient Medications:   fluticasone propionate 50 MCG/ACT Nasal Suspension, 1 spray by Each Nare route daily. , Disp: , Rfl:   Pediatric Multivit-Minerals-C (MULTIVITAMIN Rudolpho Mercedes) Oral Chew Tab, Chew by mouth., Disp: , Rfl:   Cetirizine HCl 5 MG/5ML Oral Solution, Take by mouth., Disp: , Rfl:         Allergies: Patient has no known allergies. Anesthesia Evaluation    Patient summary reviewed. Anesthetic Complications  (-) history of anesthetic complications         GI/Hepatic/Renal    Negative GI/hepatic/renal ROS. Cardiovascular    Negative cardiovascular ROS. Exercise tolerance: good     MET: >4                                           Endo/Other    Negative endo/other ROS. Pulmonary    Negative pulmonary ROS. Neuro/Psych    Negative neuro/psych ROS. History reviewed. No pertinent surgical history.   Social History    Socioeconomic History      Marital status: Single    Tobacco Use      Smoking status: Never      Smokeless tobacco: Never    Vaping Use      Vaping Use: Never used    Substance and Sexual Activity      Alcohol use: Never      Drug use: Pt continues to report severe pain; pt repositioned into wheelchair per request with pillows and reports slight relief.  Family members continue to express concern about pt pain.   Never      Drug use: Unknown     Available pre-op labs reviewed. Airway    Airway assessment appropriate for age. Cardiovascular    Cardiovascular exam normal.         Dental    Dentition appears grossly intact         Pulmonary    Pulmonary exam normal.                 Other findings            ASA: 1   Plan: general  NPO status verified and patient meets guidelines. Post-procedure pain management plan discussed with surgeon and patient. Comment: Plan GETA, ASA monitors, 1 PIV, routine recovery. Risks of sore throat, n/v, pain, dental trauma, allergy, aspiration. Risks and plan d/w pt and pt's parents - all questions answered.     Plan/risks discussed with: patient, mother and father                Present on Admission:  **None**

## 2023-06-13 NOTE — ADDENDUM NOTE
Encounter addended by: Liya Robles PA-C on: 7/31/2019 9:01 AM   Actions taken: Sign clinical note DISPLAY PLAN FREE TEXT

## 2024-05-05 NOTE — PROGRESS NOTES
Now has 2/2 positive blood cultures for GPC in clusters.  Already on zosyn for e coli so will add vancomycin.    [Normal Appearance] : normal appearance [Well Groomed] : well groomed [General Appearance - In No Acute Distress] : no acute distress [Edema] : no peripheral edema [Respiration, Rhythm And Depth] : normal respiratory rhythm and effort [Exaggerated Use Of Accessory Muscles For Inspiration] : no accessory muscle use [Abdomen Soft] : soft [Abdomen Tenderness] : non-tender [Costovertebral Angle Tenderness] : no ~M costovertebral angle tenderness [Urinary Bladder Findings] : the bladder was normal on palpation [] : no rash [No Focal Deficits] : no focal deficits [Oriented To Time, Place, And Person] : oriented to person, place, and time [Affect] : the affect was normal [Mood] : the mood was normal [No Palpable Adenopathy] : no palpable adenopathy

## 2024-12-10 NOTE — PROGRESS NOTES
01/04/22 1138   Quick Adds   Type of Visit Initial PT Evaluation   Living Environment   People in home spouse;child(heidi), adult   Current Living Arrangements house   Living Environment Comments Pt lives in split level home with stair lift and roll in shower   Self-Care   Usual Activity Tolerance fair   Current Activity Tolerance poor   Regular Exercise No   Equipment Currently Used at Home commode chair;shower chair;wheelchair, manual   Activity/Exercise/Self-Care Comment Pt is alone during the day as both her spouse and son work, her spouse assists with getting out of bed into w/c before leaving for work, pt can transfer independently to commode during the day as needed.   General Information   Onset of Illness/Injury or Date of Surgery 12/23/21   Referring Physician Olya Paiz MD   Patient/Family Therapy Goals Statement (PT) To get better   Pertinent History of Current Problem (include personal factors and/or comorbidities that impact the POC) Pt is 56 year old female adm on 12/23/21 with altered mental status, found to be in septic shock. Pt was intubated on admission and needed pressor support. Pt was extubated 12/30/21 and transferred out of ICU on 12/31/21. PMH includes MS with baclofen pump, chronic pain syndrome, diabetes mellitus type II, HTN, incr chol, CKD, non-hodgkin's lymphoma, dehydration, and obesity. On 1/3/22 pt needing BiPAP with concern for aspiration, made NPO and transferred to C status.   Existing Precautions/Restrictions fall   Cognition   Orientation Status (Cognition) oriented to;person   Affect/Mental Status (Cognition) low arousal/lethargic   Follows Commands (Cognition) follows one-step commands;25-49% accuracy   Cognitive Status Comments Pt very sleepy throughout session, difficulty remaining awake, RN notified and in to assess pt.   Pain Assessment   Patient Currently in Pain No   Integumentary/Edema   Integumentary/Edema Comments OT following for lymphedema management  Patient with refills on file, note sent to the pharmacy.     Posture    Posture Forward head position;Protracted shoulders   Range of Motion (ROM)   ROM Comment B LE ROM WFL   Strength   Strength Comments Pt actively moving R LE, no active movement of L LE noted at this time, pt inconsistenly following commands   Bed Mobility   Comment (Bed Mobility) Total assist for rolling   Transfers   Transfer Safety Comments Dependent/lift   Gait/Stairs (Locomotion)   Comment (Gait/Stairs) Pt does not ambulate at baseline   Clinical Impression   Criteria for Skilled Therapeutic Intervention yes, treatment indicated   PT Diagnosis (PT) Impaired mobility   Influenced by the following impairments Decreased strength, decreased balance, decreased activity tolerance   Functional limitations due to impairments Decreased ability to participate in daily tasks   Clinical Presentation Evolving/Changing   Clinical Presentation Rationale Current presentation, PMH, changing mental/respiratory status   Clinical Decision Making (Complexity) moderate complexity   Therapy Frequency (PT) 5x/week   Predicted Duration of Therapy Intervention (days/wks) 7 days   Planned Therapy Interventions (PT) balance training;bed mobility training;home exercise program;patient/family education;ROM (range of motion);strengthening;transfer training   Risk & Benefits of therapy have been explained evaluation/treatment results reviewed;care plan/treatment goals reviewed;risks/benefits reviewed;current/potential barriers reviewed;participants voiced agreement with care plan;participants included;patient   PT Discharge Planning    PT Discharge Recommendation (DC Rec) Transitional Care Facility   PT Rationale for DC Rec Pt is currently very lethargic, difficulty remaining awake to actively participate, is currently total assist for all mobility, anticipate need for continued inpatient rehab when medically stable for discharge from hospital. Will continue to follow during hospital stay.   PT Brief overview of current status   Total assist for all mobility   Total Evaluation Time   Total Evaluation Time (Minutes) 10

## (undated) DEVICE — CATH URETERAL DUAL LUMEN 10FRX54CM M0064051000

## (undated) DEVICE — TUBING IRRIG TUR Y TYPE 96" LF 6543-01

## (undated) DEVICE — RAD RX ISOVUE 300 (50ML) 61% IOPAMIDOL CHARGE PER ML

## (undated) DEVICE — SOL WATER IRRIG 3000ML BAG 2B7117

## (undated) DEVICE — BASKET RETRIEVAL 1.9FRX120CM ESCAPE NTNL 4 WIRE 390-201

## (undated) DEVICE — SOL NACL 0.9% IRRIG 3000ML BAG 2B7477

## (undated) DEVICE — PAD CHUX UNDERPAD 23X24" 7136

## (undated) DEVICE — SOL WATER IRRIG 1000ML BOTTLE 2F7114

## (undated) DEVICE — CAST PADDING 4" STERILE 9044S

## (undated) DEVICE — GLOVE PROTEXIS POWDER FREE SMT 7.5  2D72PT75X

## (undated) DEVICE — GLOVE PROTEXIS POWDER FREE 7.0 ORTHOPEDIC 2D73ET70

## (undated) DEVICE — PREP TECHNI-CARE CHLOROXYLENOL 3% 4OZ BOTTLE C222-4ZWO

## (undated) DEVICE — LINEN HALF SHEET 5512

## (undated) DEVICE — LINEN FULL SHEET 5511

## (undated) DEVICE — SHEATH URETERAL ACCESS NAVIGATOR 13/15FRX36CM 250-209

## (undated) DEVICE — GOWN XLG DISP 9545

## (undated) DEVICE — GLOVE PROTEXIS POWDER FREE 6.5 ORTHOPEDIC 2D73ET65

## (undated) DEVICE — COVER FOOTSWITCH W/CINCH 20X24" 923267

## (undated) DEVICE — BAG CLEAR TRASH 1.3M 39X33" P4040C

## (undated) DEVICE — GLOVE PROTEXIS BLUE W/NEU-THERA 7.0  2D73EB70

## (undated) DEVICE — PACK CYSTO CUSTOM RIDGES

## (undated) DEVICE — GLOVE PROTEXIS BLUE W/NEU-THERA 6.5  2D73EB65

## (undated) DEVICE — CATH URETERAL FLEX TIP TIGERTAIL 06FRX70CM 139006

## (undated) DEVICE — TUBING FLUID WARMER RANGER 24750

## (undated) DEVICE — LASER FIBER HOLMIUM 272UM HTB-272

## (undated) DEVICE — LINEN TOWEL PACK X5 5464

## (undated) DEVICE — LABEL MEDICATION SYSTEM 3303-P

## (undated) DEVICE — GLOVE PROTEXIS POWDER FREE 7.5 ORTHOPEDIC 2D73ET75

## (undated) DEVICE — GUIDEWIRE URO STR STIFF .035"X150CM NITINOL 150NSS35

## (undated) DEVICE — GLOVE PROTEXIS W/NEU-THERA 7.5  2D73TE75

## (undated) RX ORDER — OXYCODONE HYDROCHLORIDE 5 MG/1
TABLET ORAL
Status: DISPENSED
Start: 2019-03-15

## (undated) RX ORDER — EPHEDRINE SULFATE 50 MG/ML
INJECTION, SOLUTION INTRAMUSCULAR; INTRAVENOUS; SUBCUTANEOUS
Status: DISPENSED
Start: 2019-01-30

## (undated) RX ORDER — CEFAZOLIN SODIUM 2 G/100ML
INJECTION, SOLUTION INTRAVENOUS
Status: DISPENSED
Start: 2019-03-15

## (undated) RX ORDER — LIDOCAINE HYDROCHLORIDE 10 MG/ML
INJECTION, SOLUTION EPIDURAL; INFILTRATION; INTRACAUDAL; PERINEURAL
Status: DISPENSED
Start: 2019-03-15

## (undated) RX ORDER — DEXAMETHASONE SODIUM PHOSPHATE 4 MG/ML
INJECTION, SOLUTION INTRA-ARTICULAR; INTRALESIONAL; INTRAMUSCULAR; INTRAVENOUS; SOFT TISSUE
Status: DISPENSED
Start: 2019-03-15

## (undated) RX ORDER — PHENYLEPHRINE HCL IN 0.9% NACL 1 MG/10 ML
SYRINGE (ML) INTRAVENOUS
Status: DISPENSED
Start: 2019-01-30

## (undated) RX ORDER — FENTANYL CITRATE 50 UG/ML
INJECTION, SOLUTION INTRAMUSCULAR; INTRAVENOUS
Status: DISPENSED
Start: 2019-03-15

## (undated) RX ORDER — PROPOFOL 10 MG/ML
INJECTION, EMULSION INTRAVENOUS
Status: DISPENSED
Start: 2019-03-15

## (undated) RX ORDER — ONDANSETRON 2 MG/ML
INJECTION INTRAMUSCULAR; INTRAVENOUS
Status: DISPENSED
Start: 2019-03-15

## (undated) RX ORDER — GLYCOPYRROLATE 0.2 MG/ML
INJECTION INTRAMUSCULAR; INTRAVENOUS
Status: DISPENSED
Start: 2019-03-15